# Patient Record
Sex: FEMALE | Race: BLACK OR AFRICAN AMERICAN | Employment: FULL TIME | URBAN - METROPOLITAN AREA
[De-identification: names, ages, dates, MRNs, and addresses within clinical notes are randomized per-mention and may not be internally consistent; named-entity substitution may affect disease eponyms.]

---

## 2018-04-26 ENCOUNTER — HOSPITAL ENCOUNTER (EMERGENCY)
Age: 43
Discharge: HOME | End: 2018-04-27
Attending: EMERGENCY MEDICINE
Payer: COMMERCIAL

## 2018-04-26 DIAGNOSIS — D64.9 ANEMIA, UNSPECIFIED TYPE: Primary | ICD-10-CM

## 2018-04-26 LAB
ALBUMIN SERPL-MCNC: 4.2 G/DL (ref 3.5–5)
ALBUMIN/GLOB SERPL: 1.1 {RATIO} (ref 1.2–3.5)
ALP SERPL-CCNC: 51 U/L (ref 50–136)
ALT SERPL-CCNC: 13 U/L (ref 12–65)
ANION GAP SERPL CALC-SCNC: 13 MMOL/L (ref 7–16)
AST SERPL-CCNC: 13 U/L (ref 15–37)
BASOPHILS # BLD: 0 K/UL (ref 0–0.2)
BASOPHILS NFR BLD: 0 % (ref 0–2)
BILIRUB DIRECT SERPL-MCNC: 0.1 MG/DL
BILIRUB SERPL-MCNC: 0.3 MG/DL (ref 0.2–1.1)
BUN SERPL-MCNC: 9 MG/DL (ref 6–23)
CALCIUM SERPL-MCNC: 8.8 MG/DL (ref 8.3–10.4)
CHLORIDE SERPL-SCNC: 101 MMOL/L (ref 98–107)
CO2 SERPL-SCNC: 26 MMOL/L (ref 21–32)
CREAT SERPL-MCNC: 0.74 MG/DL (ref 0.6–1)
DIFFERENTIAL METHOD BLD: ABNORMAL
EOSINOPHIL # BLD: 0 K/UL (ref 0–0.8)
EOSINOPHIL NFR BLD: 1 % (ref 0.5–7.8)
ERYTHROCYTE [DISTWIDTH] IN BLOOD BY AUTOMATED COUNT: 23.3 % (ref 11.9–14.6)
GLOBULIN SER CALC-MCNC: 3.8 G/DL (ref 2.3–3.5)
GLUCOSE SERPL-MCNC: 124 MG/DL (ref 65–100)
HCT VFR BLD AUTO: 24 % (ref 35.8–46.3)
HGB BLD-MCNC: 5.9 G/DL (ref 11.7–15.4)
IMM GRANULOCYTES # BLD: 0 K/UL (ref 0–0.5)
IMM GRANULOCYTES NFR BLD AUTO: 0 % (ref 0–5)
INR PPP: 1.1
LACTATE BLD-SCNC: 1 MMOL/L (ref 0.5–1.9)
LYMPHOCYTES # BLD: 2.3 K/UL (ref 0.5–4.6)
LYMPHOCYTES NFR BLD: 30 % (ref 13–44)
MAGNESIUM SERPL-MCNC: 2 MG/DL (ref 1.8–2.4)
MCH RBC QN AUTO: 16 PG (ref 26.1–32.9)
MCHC RBC AUTO-ENTMCNC: 24.6 G/DL (ref 31.4–35)
MCV RBC AUTO: 65.2 FL (ref 79.6–97.8)
MONOCYTES # BLD: 0.6 K/UL (ref 0.1–1.3)
MONOCYTES NFR BLD: 8 % (ref 4–12)
NEUTS SEG # BLD: 4.7 K/UL (ref 1.7–8.2)
NEUTS SEG NFR BLD: 61 % (ref 43–78)
PLATELET # BLD AUTO: 386 K/UL (ref 150–450)
PMV BLD AUTO: 9.9 FL (ref 10.8–14.1)
POTASSIUM SERPL-SCNC: 3.8 MMOL/L (ref 3.5–5.1)
PROT SERPL-MCNC: 8 G/DL (ref 6.3–8.2)
PROTHROMBIN TIME: 14.5 SEC (ref 11.5–14.5)
RBC # BLD AUTO: 3.68 M/UL (ref 4.05–5.25)
SODIUM SERPL-SCNC: 140 MMOL/L (ref 136–145)
WBC # BLD AUTO: 7.7 K/UL (ref 4.3–11.1)

## 2018-04-26 PROCEDURE — 36430 TRANSFUSION BLD/BLD COMPNT: CPT

## 2018-04-26 PROCEDURE — 80076 HEPATIC FUNCTION PANEL: CPT | Performed by: EMERGENCY MEDICINE

## 2018-04-26 PROCEDURE — 86923 COMPATIBILITY TEST ELECTRIC: CPT | Performed by: EMERGENCY MEDICINE

## 2018-04-26 PROCEDURE — 80048 BASIC METABOLIC PNL TOTAL CA: CPT | Performed by: EMERGENCY MEDICINE

## 2018-04-26 PROCEDURE — 85025 COMPLETE CBC W/AUTO DIFF WBC: CPT | Performed by: EMERGENCY MEDICINE

## 2018-04-26 PROCEDURE — 99284 EMERGENCY DEPT VISIT MOD MDM: CPT | Performed by: EMERGENCY MEDICINE

## 2018-04-26 PROCEDURE — P9016 RBC LEUKOCYTES REDUCED: HCPCS | Performed by: EMERGENCY MEDICINE

## 2018-04-26 PROCEDURE — 83605 ASSAY OF LACTIC ACID: CPT

## 2018-04-26 PROCEDURE — 86900 BLOOD TYPING SEROLOGIC ABO: CPT | Performed by: EMERGENCY MEDICINE

## 2018-04-26 PROCEDURE — 83735 ASSAY OF MAGNESIUM: CPT | Performed by: EMERGENCY MEDICINE

## 2018-04-26 PROCEDURE — 85610 PROTHROMBIN TIME: CPT | Performed by: EMERGENCY MEDICINE

## 2018-04-26 RX ORDER — SODIUM CHLORIDE 9 MG/ML
250 INJECTION, SOLUTION INTRAVENOUS AS NEEDED
Status: DISCONTINUED | OUTPATIENT
Start: 2018-04-26 | End: 2018-04-27 | Stop reason: HOSPADM

## 2018-04-26 NOTE — ED TRIAGE NOTES
Pt was called by MD office and told to come to ER for transfusion because of abnormal labs. Dr. Johann Ghosh is pts OB-GYN.

## 2018-04-27 VITALS
SYSTOLIC BLOOD PRESSURE: 108 MMHG | HEART RATE: 84 BPM | WEIGHT: 136 LBS | BODY MASS INDEX: 23.22 KG/M2 | HEIGHT: 64 IN | OXYGEN SATURATION: 99 % | TEMPERATURE: 98 F | DIASTOLIC BLOOD PRESSURE: 64 MMHG | RESPIRATION RATE: 18 BRPM

## 2018-04-27 RX ORDER — DOCUSATE SODIUM 100 MG/1
200 CAPSULE, LIQUID FILLED ORAL DAILY
Qty: 62 CAP | Refills: 0 | Status: SHIPPED | OUTPATIENT
Start: 2018-04-27 | End: 2018-04-28

## 2018-04-27 RX ORDER — FERROUS SULFATE 325(65) MG
325 TABLET, DELAYED RELEASE (ENTERIC COATED) ORAL
Qty: 90 TAB | Refills: 0 | Status: SHIPPED | OUTPATIENT
Start: 2018-04-27 | End: 2019-01-24

## 2018-04-27 NOTE — ED NOTES
I have reviewed discharge instructions with the patient. The patient verbalized understanding. Patient left ED via Discharge Method: ambulatory to Home with (family). Opportunity for questions and clarification provided. Patient given 2 scripts. To continue your aftercare when you leave the hospital, you may receive an automated call from our care team to check in on how you are doing. This is a free service and part of our promise to provide the best care and service to meet your aftercare needs.  If you have questions, or wish to unsubscribe from this service please call [phone]. Thank you for Choosing our [facility] Emergency Department.

## 2018-04-28 LAB
ABO + RH BLD: NORMAL
BLD PROD TYP BPU: NORMAL
BLD PROD TYP BPU: NORMAL
BLOOD GROUP ANTIBODIES SERPL: NORMAL
BPU ID: NORMAL
BPU ID: NORMAL
CROSSMATCH RESULT,%XM: NORMAL
CROSSMATCH RESULT,%XM: NORMAL
SPECIMEN EXP DATE BLD: NORMAL
STATUS OF UNIT,%ST: NORMAL
STATUS OF UNIT,%ST: NORMAL
UNIT DIVISION, %UDIV: 0
UNIT DIVISION, %UDIV: 0

## 2018-04-30 NOTE — ED PROVIDER NOTES
HPI Comments: Sent to the e.r. For a hemoglobin of 5.4  Hemoglobin was 7.0, a month ago as checked by her GYN. Patient was not yet started on iron  Seen yesterday and was found to be Hemoccult positive with normal brown stools, however patient is also on her menstrual cycle currently and at the time the Hemoccult was done. She has no complaintdenies heavy periods, has some intermittent dark stools    Patient is a 37 y.o. female presenting with abnormal lab results. The history is provided by the patient. Abnormal Lab Results   This is a new problem. Pertinent negatives include no chest pain, no abdominal pain, no headaches and no shortness of breath. Past Medical History:   Diagnosis Date    Abnormal pap     LGSIL    Contraception     vasectomy    Genital HSV     GERD (gastroesophageal reflux disease)     no meds        Past Surgical History:   Procedure Laterality Date    HX  SECTION      HX GI  14    gastric sleeve, Choudhari    HX MYOMECTOMY      HX TONSILLECTOMY           Family History:   Problem Relation Age of Onset    Heart Disease Maternal Grandmother     Hypertension Maternal Grandmother     Heart Disease Maternal Grandfather     Hypertension Maternal Grandfather     Breast Cancer Neg Hx     Ovarian Cancer Neg Hx     Colon Cancer Neg Hx     Prostate Cancer Neg Hx     Deep Vein Thrombosis Neg Hx     Pulmonary Embolism Neg Hx        Social History     Social History    Marital status: SINGLE     Spouse name: N/A    Number of children: N/A    Years of education: N/A     Occupational History    Not on file. Social History Main Topics    Smoking status: Never Smoker    Smokeless tobacco: Never Used    Alcohol use No      Comment: rare- 2 drinks/month    Drug use: No    Sexual activity: Yes     Partners: Male     Birth control/ protection: None     Other Topics Concern    Not on file     Social History Narrative    1. Use large speculum.      2. sister, Price Rodriguez #[id number] and mom is Perez Morrison #[id number] (both Kofoed's pts)    3. PCP Dr. Holly Prado,          ALLERGIES: Review of patient's allergies indicates no known allergies. Review of Systems   Constitutional: Negative for chills and fever. HENT: Negative for rhinorrhea and sore throat. Eyes: Negative for discharge and redness. Respiratory: Negative for cough and shortness of breath. Cardiovascular: Negative for chest pain and palpitations. Gastrointestinal: Positive for blood in stool (possible melena, intermittently). Negative for abdominal pain, diarrhea, nausea and vomiting. Musculoskeletal: Negative for arthralgias and back pain. Skin: Negative for rash. Neurological: Negative for dizziness, light-headedness and headaches. All other systems reviewed and are negative. Vitals:    04/27/18 0017 04/27/18 0047 04/27/18 0117 04/27/18 0145   BP: 92/58 104/68 107/57 108/64   Pulse:    84   Resp: 20 20 20 18   Temp:    98 °F (36.7 °C)   SpO2: 100% 100% 100% 99%   Weight:       Height:                Physical Exam   Constitutional: She is oriented to person, place, and time. She appears well-developed and well-nourished. No distress. HENT:   Head: Normocephalic and atraumatic. Eyes: Conjunctivae are normal. Pupils are equal, round, and reactive to light. Right eye exhibits no discharge. Left eye exhibits no discharge. No scleral icterus. Neck: Normal range of motion. Neck supple. Cardiovascular: Normal rate, regular rhythm and normal heart sounds. Exam reveals no gallop. No murmur heard. Pulmonary/Chest: Effort normal and breath sounds normal. No respiratory distress. She has no wheezes. She has no rales. Abdominal: Soft. Bowel sounds are normal. There is no tenderness. There is no guarding.    Genitourinary:   Genitourinary Comments: Rectal exam deferred since she was reportedly Hemoccult-positive yesterday, and is currently on her menstrual cycle Musculoskeletal: Normal range of motion. She exhibits no edema. Neurological: She is alert and oriented to person, place, and time. She exhibits normal muscle tone. cni 2-12 grossly   Skin: Skin is warm and dry. She is not diaphoretic. Psychiatric: She has a normal mood and affect. Her behavior is normal.   Nursing note and vitals reviewed. MDM  Number of Diagnoses or Management Options  Anemia, unspecified type:   Diagnosis management comments: Medical decision making note:  Anemia, stable  Transfused 2 units  Start PPI, refer to GI  This concludes the \"medical decision making note\" part of this emergency department visit note.           ED Course       Procedures

## 2018-06-01 ENCOUNTER — HOSPITAL ENCOUNTER (OUTPATIENT)
Dept: LAB | Age: 43
Discharge: HOME | End: 2018-06-01
Payer: COMMERCIAL

## 2018-06-01 DIAGNOSIS — D64.9 ANEMIA, UNSPECIFIED TYPE: ICD-10-CM

## 2018-06-01 PROBLEM — D50.0 IRON DEFICIENCY ANEMIA DUE TO CHRONIC BLOOD LOSS: Status: ACTIVE | Noted: 2018-06-01

## 2018-06-01 LAB
ALBUMIN SERPL-MCNC: 4 G/DL (ref 3.5–5)
ALBUMIN/GLOB SERPL: 1.1 {RATIO} (ref 1.2–3.5)
ALP SERPL-CCNC: 46 U/L (ref 50–136)
ALT SERPL-CCNC: 18 U/L (ref 12–65)
ANION GAP SERPL CALC-SCNC: 3 MMOL/L (ref 7–16)
AST SERPL-CCNC: 14 U/L (ref 15–37)
BASOPHILS # BLD: 0 K/UL (ref 0–0.2)
BASOPHILS NFR BLD: 0 % (ref 0–2)
BILIRUB SERPL-MCNC: 0.4 MG/DL (ref 0.2–1.1)
BUN SERPL-MCNC: 10 MG/DL (ref 6–23)
CALCIUM SERPL-MCNC: 8.8 MG/DL (ref 8.3–10.4)
CHLORIDE SERPL-SCNC: 105 MMOL/L (ref 98–107)
CO2 SERPL-SCNC: 30 MMOL/L (ref 21–32)
CREAT SERPL-MCNC: 0.57 MG/DL (ref 0.6–1)
DIFFERENTIAL METHOD BLD: ABNORMAL
EOSINOPHIL # BLD: 0 K/UL (ref 0–0.8)
EOSINOPHIL NFR BLD: 0 % (ref 0.5–7.8)
ERYTHROCYTE [DISTWIDTH] IN BLOOD BY AUTOMATED COUNT: 25.8 % (ref 11.9–14.6)
FERRITIN SERPL-MCNC: 5 NG/ML (ref 8–388)
FOLATE SERPL-MCNC: 20.6 NG/ML (ref 3.1–17.5)
GLOBULIN SER CALC-MCNC: 3.6 G/DL (ref 2.3–3.5)
GLUCOSE SERPL-MCNC: 70 MG/DL (ref 65–100)
HAPTOGLOB SERPL-MCNC: 104 MG/DL (ref 30–200)
HCT VFR BLD AUTO: 33.2 % (ref 35.8–46.3)
HGB BLD-MCNC: 9.5 G/DL (ref 11.7–15.4)
HGB RETIC QN AUTO: 20 PG (ref 29–35)
IMM RETICS NFR: 17.9 % (ref 3–15.9)
IRON SATN MFR SERPL: 7 %
IRON SERPL-MCNC: 31 UG/DL (ref 35–150)
LDH SERPL L TO P-CCNC: 155 U/L (ref 100–190)
LYMPHOCYTES # BLD: 1.9 K/UL (ref 0.5–4.6)
LYMPHOCYTES NFR BLD: 37 % (ref 13–44)
MCH RBC QN AUTO: 22.1 PG (ref 26.1–32.9)
MCHC RBC AUTO-ENTMCNC: 28.6 G/DL (ref 31.4–35)
MCV RBC AUTO: 77.4 FL (ref 79.6–97.8)
MONOCYTES # BLD: 0.5 K/UL (ref 0.1–1.3)
MONOCYTES NFR BLD: 10 % (ref 4–12)
NEUTS SEG # BLD: 2.8 K/UL (ref 1.7–8.2)
NEUTS SEG NFR BLD: 53 % (ref 43–78)
NRBC # BLD: 0 K/UL (ref 0–0.2)
PLATELET # BLD AUTO: 187 K/UL (ref 150–450)
PLATELET COMMENTS,PCOM: ADEQUATE
POTASSIUM SERPL-SCNC: 4 MMOL/L (ref 3.5–5.1)
PROT SERPL-MCNC: 7.6 G/DL (ref 6.3–8.2)
RBC # BLD AUTO: 4.29 M/UL (ref 4.05–5.25)
RBC MORPH BLD: ABNORMAL
RETICS # AUTO: 0.04 M/UL (ref 0.03–0.1)
RETICS/RBC NFR AUTO: 0.9 % (ref 0.3–2)
SODIUM SERPL-SCNC: 138 MMOL/L (ref 136–145)
TIBC SERPL-MCNC: 436 UG/DL (ref 250–450)
VIT B12 SERPL-MCNC: 388 PG/ML (ref 193–986)
WBC # BLD AUTO: 5.2 K/UL (ref 4.3–11.1)
WBC MORPH BLD: ABNORMAL

## 2018-06-01 PROCEDURE — 83010 ASSAY OF HAPTOGLOBIN QUANT: CPT | Performed by: INTERNAL MEDICINE

## 2018-06-01 PROCEDURE — 83615 LACTATE (LD) (LDH) ENZYME: CPT | Performed by: INTERNAL MEDICINE

## 2018-06-01 PROCEDURE — 82607 VITAMIN B-12: CPT | Performed by: INTERNAL MEDICINE

## 2018-06-01 PROCEDURE — 82728 ASSAY OF FERRITIN: CPT | Performed by: INTERNAL MEDICINE

## 2018-06-01 PROCEDURE — 82525 ASSAY OF COPPER: CPT | Performed by: INTERNAL MEDICINE

## 2018-06-01 PROCEDURE — 80053 COMPREHEN METABOLIC PANEL: CPT | Performed by: INTERNAL MEDICINE

## 2018-06-01 PROCEDURE — 83540 ASSAY OF IRON: CPT | Performed by: INTERNAL MEDICINE

## 2018-06-01 PROCEDURE — 82746 ASSAY OF FOLIC ACID SERUM: CPT | Performed by: INTERNAL MEDICINE

## 2018-06-01 PROCEDURE — 85046 RETICYTE/HGB CONCENTRATE: CPT | Performed by: INTERNAL MEDICINE

## 2018-06-01 PROCEDURE — 85025 COMPLETE CBC W/AUTO DIFF WBC: CPT | Performed by: INTERNAL MEDICINE

## 2018-06-01 PROCEDURE — 36415 COLL VENOUS BLD VENIPUNCTURE: CPT | Performed by: INTERNAL MEDICINE

## 2018-06-02 LAB — COPPER SERPL-MCNC: 111 UG/DL (ref 72–166)

## 2018-06-06 LAB — PATH REV BLD -IMP: NORMAL

## 2018-06-12 ENCOUNTER — HOSPITAL ENCOUNTER (OUTPATIENT)
Dept: INFUSION THERAPY | Age: 43
Discharge: HOME | End: 2018-06-12
Payer: COMMERCIAL

## 2018-06-12 VITALS
WEIGHT: 142 LBS | DIASTOLIC BLOOD PRESSURE: 61 MMHG | SYSTOLIC BLOOD PRESSURE: 94 MMHG | BODY MASS INDEX: 24.37 KG/M2 | RESPIRATION RATE: 16 BRPM | TEMPERATURE: 97.8 F | OXYGEN SATURATION: 100 % | HEART RATE: 59 BPM

## 2018-06-12 DIAGNOSIS — D50.0 IRON DEFICIENCY ANEMIA DUE TO CHRONIC BLOOD LOSS: ICD-10-CM

## 2018-06-12 PROCEDURE — 96361 HYDRATE IV INFUSION ADD-ON: CPT

## 2018-06-12 PROCEDURE — 96365 THER/PROPH/DIAG IV INF INIT: CPT

## 2018-06-12 PROCEDURE — 74011250636 HC RX REV CODE- 250/636: Performed by: INTERNAL MEDICINE

## 2018-06-12 RX ADMIN — SODIUM CHLORIDE 500 ML: 900 INJECTION, SOLUTION INTRAVENOUS at 15:45

## 2018-06-12 RX ADMIN — FERRIC CARBOXYMALTOSE INJECTION 750 MG: 50 INJECTION, SOLUTION INTRAVENOUS at 15:54

## 2018-06-12 NOTE — PROGRESS NOTES
Pt arrived ambulatory today at 1504, to receive IV injectafer. Pt tolerated without difficulty. Patient discharged via ambulatory accompanied by self. Instructed to notify physician of any problems, questions or concerns. Allowed opportunity for patient/family to ask questions. Verbalized understanding. Next appointment is June 19 at 1500 with Jg Pressley.

## 2018-06-19 ENCOUNTER — HOSPITAL ENCOUNTER (OUTPATIENT)
Dept: INFUSION THERAPY | Age: 43
Discharge: HOME | End: 2018-06-19
Payer: COMMERCIAL

## 2018-06-19 VITALS
BODY MASS INDEX: 23.79 KG/M2 | TEMPERATURE: 98.1 F | OXYGEN SATURATION: 100 % | DIASTOLIC BLOOD PRESSURE: 62 MMHG | RESPIRATION RATE: 16 BRPM | WEIGHT: 138.6 LBS | SYSTOLIC BLOOD PRESSURE: 94 MMHG | HEART RATE: 86 BPM

## 2018-06-19 DIAGNOSIS — D50.0 IRON DEFICIENCY ANEMIA DUE TO CHRONIC BLOOD LOSS: ICD-10-CM

## 2018-06-19 PROCEDURE — 74011250636 HC RX REV CODE- 250/636: Performed by: INTERNAL MEDICINE

## 2018-06-19 PROCEDURE — 96365 THER/PROPH/DIAG IV INF INIT: CPT

## 2018-06-19 RX ADMIN — FERRIC CARBOXYMALTOSE INJECTION 750 MG: 50 INJECTION, SOLUTION INTRAVENOUS at 15:30

## 2018-06-19 RX ADMIN — SODIUM CHLORIDE 500 ML: 900 INJECTION, SOLUTION INTRAVENOUS at 15:25

## 2018-06-19 NOTE — PROGRESS NOTES
Arrived to the [facility]. Injectafer completed. Patient tolerated well. Any issues or concerns during appointment: none  Patient does not require next infusion appointment. MD f/elvia scheduled 7/16/18. Discharged ambulatory.

## 2018-06-28 PROBLEM — N92.0 MENORRHAGIA: Status: ACTIVE | Noted: 2018-06-28

## 2018-07-16 ENCOUNTER — HOSPITAL ENCOUNTER (OUTPATIENT)
Dept: MAMMOGRAPHY | Age: 43
Discharge: HOME | End: 2018-07-16
Attending: OBSTETRICS & GYNECOLOGY
Payer: COMMERCIAL

## 2018-07-16 ENCOUNTER — HOSPITAL ENCOUNTER (OUTPATIENT)
Dept: LAB | Age: 43
Discharge: HOME | End: 2018-07-16
Payer: COMMERCIAL

## 2018-07-16 DIAGNOSIS — D50.0 IRON DEFICIENCY ANEMIA DUE TO CHRONIC BLOOD LOSS: ICD-10-CM

## 2018-07-16 DIAGNOSIS — Z12.39 SCREENING FOR BREAST CANCER: ICD-10-CM

## 2018-07-16 LAB
ALBUMIN SERPL-MCNC: 4 G/DL (ref 3.5–5)
ALBUMIN/GLOB SERPL: 1.4 {RATIO} (ref 1.2–3.5)
ALP SERPL-CCNC: 53 U/L (ref 50–136)
ALT SERPL-CCNC: 25 U/L (ref 12–65)
ANION GAP SERPL CALC-SCNC: 5 MMOL/L (ref 7–16)
AST SERPL-CCNC: 13 U/L (ref 15–37)
BASOPHILS # BLD: 0 K/UL (ref 0–0.2)
BASOPHILS NFR BLD: 0 % (ref 0–2)
BILIRUB SERPL-MCNC: 0.2 MG/DL (ref 0.2–1.1)
BUN SERPL-MCNC: 9 MG/DL (ref 6–23)
CALCIUM SERPL-MCNC: 8.3 MG/DL (ref 8.3–10.4)
CHLORIDE SERPL-SCNC: 106 MMOL/L (ref 98–107)
CO2 SERPL-SCNC: 28 MMOL/L (ref 21–32)
CREAT SERPL-MCNC: 0.57 MG/DL (ref 0.6–1)
DIFFERENTIAL METHOD BLD: ABNORMAL
EOSINOPHIL # BLD: 0.1 K/UL (ref 0–0.8)
EOSINOPHIL NFR BLD: 1 % (ref 0.5–7.8)
ERYTHROCYTE [DISTWIDTH] IN BLOOD BY AUTOMATED COUNT: 21.3 % (ref 11.9–14.6)
FERRITIN SERPL-MCNC: 192 NG/ML (ref 8–388)
GLOBULIN SER CALC-MCNC: 2.9 G/DL (ref 2.3–3.5)
GLUCOSE SERPL-MCNC: 107 MG/DL (ref 65–100)
HCT VFR BLD AUTO: 37.6 % (ref 35.8–46.3)
HGB BLD-MCNC: 11.5 G/DL (ref 11.7–15.4)
HGB RETIC QN AUTO: 31 PG (ref 29–35)
IMM RETICS NFR: 10.5 % (ref 3–15.9)
IRON SATN MFR SERPL: 21 %
IRON SERPL-MCNC: 62 UG/DL (ref 35–150)
LYMPHOCYTES # BLD: 1.7 K/UL (ref 0.5–4.6)
LYMPHOCYTES NFR BLD: 32 % (ref 13–44)
MCH RBC QN AUTO: 26.8 PG (ref 26.1–32.9)
MCHC RBC AUTO-ENTMCNC: 30.6 G/DL (ref 31.4–35)
MCV RBC AUTO: 87.6 FL (ref 79.6–97.8)
MONOCYTES # BLD: 0.4 K/UL (ref 0.1–1.3)
MONOCYTES NFR BLD: 7 % (ref 4–12)
NEUTS SEG # BLD: 3 K/UL (ref 1.7–8.2)
NEUTS SEG NFR BLD: 60 % (ref 43–78)
NRBC # BLD: 0 K/UL (ref 0–0.2)
PLATELET # BLD AUTO: 124 K/UL (ref 150–450)
PLATELET COMMENTS,PCOM: ABNORMAL
POTASSIUM SERPL-SCNC: 4.1 MMOL/L (ref 3.5–5.1)
PROT SERPL-MCNC: 6.9 G/DL (ref 6.3–8.2)
RBC # BLD AUTO: 4.29 M/UL (ref 4.05–5.25)
RBC MORPH BLD: ABNORMAL
RETICS # AUTO: 0.05 M/UL (ref 0.03–0.1)
RETICS/RBC NFR AUTO: 1.1 % (ref 0.3–2)
SODIUM SERPL-SCNC: 139 MMOL/L (ref 136–145)
TIBC SERPL-MCNC: 290 UG/DL (ref 250–450)
WBC # BLD AUTO: 5.2 K/UL (ref 4.3–11.1)
WBC MORPH BLD: ABNORMAL

## 2018-07-16 PROCEDURE — 80053 COMPREHEN METABOLIC PANEL: CPT | Performed by: INTERNAL MEDICINE

## 2018-07-16 PROCEDURE — 83540 ASSAY OF IRON: CPT | Performed by: INTERNAL MEDICINE

## 2018-07-16 PROCEDURE — 85025 COMPLETE CBC W/AUTO DIFF WBC: CPT | Performed by: INTERNAL MEDICINE

## 2018-07-16 PROCEDURE — 77067 SCR MAMMO BI INCL CAD: CPT

## 2018-07-16 PROCEDURE — 85046 RETICYTE/HGB CONCENTRATE: CPT | Performed by: INTERNAL MEDICINE

## 2018-07-16 PROCEDURE — 36415 COLL VENOUS BLD VENIPUNCTURE: CPT | Performed by: INTERNAL MEDICINE

## 2018-07-16 PROCEDURE — 82728 ASSAY OF FERRITIN: CPT | Performed by: INTERNAL MEDICINE

## 2018-09-17 ENCOUNTER — HOSPITAL ENCOUNTER (OUTPATIENT)
Dept: INFUSION THERAPY | Age: 43
Discharge: HOME | End: 2018-09-17

## 2018-09-17 ENCOUNTER — HOSPITAL ENCOUNTER (OUTPATIENT)
Dept: LAB | Age: 43
Discharge: HOME | End: 2018-09-17
Payer: COMMERCIAL

## 2018-09-17 DIAGNOSIS — D50.0 IRON DEFICIENCY ANEMIA DUE TO CHRONIC BLOOD LOSS: ICD-10-CM

## 2018-09-17 LAB
ALBUMIN SERPL-MCNC: 3.9 G/DL (ref 3.5–5)
ALBUMIN/GLOB SERPL: 1.2 {RATIO} (ref 1.2–3.5)
ALP SERPL-CCNC: 52 U/L (ref 50–136)
ALT SERPL-CCNC: 19 U/L (ref 12–65)
ANION GAP SERPL CALC-SCNC: 6 MMOL/L (ref 7–16)
AST SERPL-CCNC: 10 U/L (ref 15–37)
BASOPHILS # BLD: 0 K/UL (ref 0–0.2)
BASOPHILS NFR BLD: 0 % (ref 0–2)
BILIRUB SERPL-MCNC: 0.3 MG/DL (ref 0.2–1.1)
BUN SERPL-MCNC: 9 MG/DL (ref 6–23)
CALCIUM SERPL-MCNC: 8.6 MG/DL (ref 8.3–10.4)
CHLORIDE SERPL-SCNC: 106 MMOL/L (ref 98–107)
CO2 SERPL-SCNC: 27 MMOL/L (ref 21–32)
CREAT SERPL-MCNC: 0.69 MG/DL (ref 0.6–1)
DIFFERENTIAL METHOD BLD: ABNORMAL
EOSINOPHIL # BLD: 0 K/UL (ref 0–0.8)
EOSINOPHIL NFR BLD: 0 % (ref 0.5–7.8)
ERYTHROCYTE [DISTWIDTH] IN BLOOD BY AUTOMATED COUNT: 14 % (ref 11.9–14.6)
FERRITIN SERPL-MCNC: 63 NG/ML (ref 8–388)
FOLATE SERPL-MCNC: 17.7 NG/ML (ref 3.1–17.5)
GLOBULIN SER CALC-MCNC: 3.3 G/DL (ref 2.3–3.5)
GLUCOSE SERPL-MCNC: 101 MG/DL (ref 65–100)
HBV SURFACE AB SERPL IA-ACNC: <3.1 MIU/ML
HCT VFR BLD AUTO: 39.2 % (ref 35.8–46.3)
HGB BLD-MCNC: 12.1 G/DL (ref 11.7–15.4)
HGB RETIC QN AUTO: 31 PG (ref 29–35)
IMM GRANULOCYTES # BLD: 0 K/UL (ref 0–0.5)
IMM GRANULOCYTES NFR BLD AUTO: 0 % (ref 0–5)
IMM RETICS NFR: 10.4 % (ref 3–15.9)
IRON SATN MFR SERPL: 22 %
IRON SERPL-MCNC: 53 UG/DL (ref 35–150)
LYMPHOCYTES # BLD: 1.8 K/UL (ref 0.5–4.6)
LYMPHOCYTES NFR BLD: 26 % (ref 13–44)
MCH RBC QN AUTO: 27.4 PG (ref 26.1–32.9)
MCHC RBC AUTO-ENTMCNC: 30.9 G/DL (ref 31.4–35)
MCV RBC AUTO: 88.9 FL (ref 79.6–97.8)
MONOCYTES # BLD: 0.5 K/UL (ref 0.1–1.3)
MONOCYTES NFR BLD: 7 % (ref 4–12)
NEUTS SEG # BLD: 4.6 K/UL (ref 1.7–8.2)
NEUTS SEG NFR BLD: 66 % (ref 43–78)
NRBC # BLD: 0 K/UL (ref 0–0.2)
PLATELET # BLD AUTO: 129 K/UL (ref 150–450)
PMV BLD AUTO: 10.8 FL (ref 9.4–12.3)
POTASSIUM SERPL-SCNC: 3.9 MMOL/L (ref 3.5–5.1)
PROT SERPL-MCNC: 7.2 G/DL (ref 6.3–8.2)
RBC # BLD AUTO: 4.41 M/UL (ref 4.05–5.25)
RETICS # AUTO: 0.05 M/UL (ref 0.03–0.1)
RETICS/RBC NFR AUTO: 1.1 % (ref 0.3–2)
SODIUM SERPL-SCNC: 139 MMOL/L (ref 136–145)
TIBC SERPL-MCNC: 243 UG/DL (ref 250–450)
TSH SERPL DL<=0.005 MIU/L-ACNC: 0.91 UIU/ML (ref 0.36–3.74)
VIT B12 SERPL-MCNC: 332 PG/ML (ref 193–986)
WBC # BLD AUTO: 6.9 K/UL (ref 4.3–11.1)

## 2018-09-17 PROCEDURE — 86706 HEP B SURFACE ANTIBODY: CPT

## 2018-09-17 PROCEDURE — 82607 VITAMIN B-12: CPT

## 2018-09-17 PROCEDURE — 80053 COMPREHEN METABOLIC PANEL: CPT

## 2018-09-17 PROCEDURE — 83540 ASSAY OF IRON: CPT

## 2018-09-17 PROCEDURE — 80074 ACUTE HEPATITIS PANEL: CPT

## 2018-09-17 PROCEDURE — 85025 COMPLETE CBC W/AUTO DIFF WBC: CPT

## 2018-09-17 PROCEDURE — 84443 ASSAY THYROID STIM HORMONE: CPT

## 2018-09-17 PROCEDURE — 85046 RETICYTE/HGB CONCENTRATE: CPT

## 2018-09-17 PROCEDURE — 36415 COLL VENOUS BLD VENIPUNCTURE: CPT

## 2018-09-17 PROCEDURE — 82728 ASSAY OF FERRITIN: CPT

## 2018-09-17 PROCEDURE — 82746 ASSAY OF FOLIC ACID SERUM: CPT

## 2018-09-18 LAB
HAV IGM SERPL QL IA: NEGATIVE
HBV CORE IGM SERPL QL IA: NEGATIVE
HBV SURFACE AG SERPL QL IA: NEGATIVE
HCV AB S/CO SERPL IA: <0.1 S/CO RATIO (ref 0–0.9)

## 2019-01-24 ENCOUNTER — HOSPITAL ENCOUNTER (OUTPATIENT)
Dept: LAB | Age: 44
Discharge: HOME | End: 2019-01-24
Payer: COMMERCIAL

## 2019-01-24 DIAGNOSIS — D50.0 IRON DEFICIENCY ANEMIA DUE TO CHRONIC BLOOD LOSS: ICD-10-CM

## 2019-01-24 PROBLEM — Z98.84 HISTORY OF WEIGHT LOSS SURGERY: Status: ACTIVE | Noted: 2019-01-24

## 2019-01-24 LAB
ALBUMIN SERPL-MCNC: 3.9 G/DL (ref 3.5–5)
ALBUMIN/GLOB SERPL: 1.1 {RATIO} (ref 1.2–3.5)
ALP SERPL-CCNC: 48 U/L (ref 50–136)
ALT SERPL-CCNC: 10 U/L (ref 12–65)
ANION GAP SERPL CALC-SCNC: 5 MMOL/L (ref 7–16)
AST SERPL-CCNC: 7 U/L (ref 15–37)
BASOPHILS # BLD: 0 K/UL (ref 0–0.2)
BASOPHILS NFR BLD: 0 % (ref 0–2)
BILIRUB SERPL-MCNC: 0.3 MG/DL (ref 0.2–1.1)
BUN SERPL-MCNC: 8 MG/DL (ref 6–23)
CALCIUM SERPL-MCNC: 8.2 MG/DL (ref 8.3–10.4)
CHLORIDE SERPL-SCNC: 107 MMOL/L (ref 98–107)
CO2 SERPL-SCNC: 28 MMOL/L (ref 21–32)
CREAT SERPL-MCNC: 0.63 MG/DL (ref 0.6–1)
DIFFERENTIAL METHOD BLD: ABNORMAL
EOSINOPHIL # BLD: 0 K/UL (ref 0–0.8)
EOSINOPHIL NFR BLD: 1 % (ref 0.5–7.8)
ERYTHROCYTE [DISTWIDTH] IN BLOOD BY AUTOMATED COUNT: 12.8 % (ref 11.9–14.6)
FERRITIN SERPL-MCNC: 4 NG/ML (ref 8–388)
FOLATE SERPL-MCNC: 15.8 NG/ML (ref 3.1–17.5)
GLOBULIN SER CALC-MCNC: 3.4 G/DL (ref 2.3–3.5)
GLUCOSE SERPL-MCNC: 76 MG/DL (ref 65–100)
HCT VFR BLD AUTO: 36.5 % (ref 35.8–46.3)
HGB BLD-MCNC: 11.2 G/DL (ref 11.7–15.4)
HGB RETIC QN AUTO: 24 PG (ref 29–35)
IMM GRANULOCYTES # BLD AUTO: 0 K/UL (ref 0–0.5)
IMM GRANULOCYTES NFR BLD AUTO: 0 % (ref 0–5)
IMM RETICS NFR: 22.8 % (ref 3–15.9)
IRON SATN MFR SERPL: 9 %
IRON SERPL-MCNC: 33 UG/DL (ref 35–150)
LYMPHOCYTES # BLD: 1.4 K/UL (ref 0.5–4.6)
LYMPHOCYTES NFR BLD: 26 % (ref 13–44)
MCH RBC QN AUTO: 26.8 PG (ref 26.1–32.9)
MCHC RBC AUTO-ENTMCNC: 30.7 G/DL (ref 31.4–35)
MCV RBC AUTO: 87.3 FL (ref 79.6–97.8)
MONOCYTES # BLD: 0.4 K/UL (ref 0.1–1.3)
MONOCYTES NFR BLD: 8 % (ref 4–12)
NEUTS SEG # BLD: 3.6 K/UL (ref 1.7–8.2)
NEUTS SEG NFR BLD: 66 % (ref 43–78)
NRBC # BLD: 0.01 K/UL (ref 0–0.2)
PLATELET # BLD AUTO: 147 K/UL (ref 150–450)
PMV BLD AUTO: 11.2 FL (ref 9.4–12.3)
POTASSIUM SERPL-SCNC: 4.1 MMOL/L (ref 3.5–5.1)
PROT SERPL-MCNC: 7.3 G/DL (ref 6.3–8.2)
RBC # BLD AUTO: 4.18 M/UL (ref 4.05–5.25)
RETICS # AUTO: 0.04 M/UL (ref 0.03–0.1)
RETICS/RBC NFR AUTO: 1 % (ref 0.3–2)
SODIUM SERPL-SCNC: 140 MMOL/L (ref 136–145)
TIBC SERPL-MCNC: 358 UG/DL (ref 250–450)
VIT B12 SERPL-MCNC: 341 PG/ML (ref 193–986)
WBC # BLD AUTO: 5.4 K/UL (ref 4.3–11.1)

## 2019-01-24 PROCEDURE — 82746 ASSAY OF FOLIC ACID SERUM: CPT

## 2019-01-24 PROCEDURE — 83540 ASSAY OF IRON: CPT

## 2019-01-24 PROCEDURE — 85046 RETICYTE/HGB CONCENTRATE: CPT

## 2019-01-24 PROCEDURE — 82728 ASSAY OF FERRITIN: CPT

## 2019-01-24 PROCEDURE — 87389 HIV-1 AG W/HIV-1&-2 AB AG IA: CPT

## 2019-01-24 PROCEDURE — 85025 COMPLETE CBC W/AUTO DIFF WBC: CPT

## 2019-01-24 PROCEDURE — 36415 COLL VENOUS BLD VENIPUNCTURE: CPT

## 2019-01-24 PROCEDURE — 80053 COMPREHEN METABOLIC PANEL: CPT

## 2019-01-24 PROCEDURE — 84630 ASSAY OF ZINC: CPT

## 2019-01-24 PROCEDURE — 82607 VITAMIN B-12: CPT

## 2019-01-24 PROCEDURE — 82525 ASSAY OF COPPER: CPT

## 2019-01-25 LAB — HIV 1+2 AB+HIV1 P24 AG SERPL QL IA: NON REACTIVE

## 2019-01-26 LAB
COPPER SERPL-MCNC: 108 UG/DL (ref 72–166)
ZINC SERPL-MCNC: 65 UG/DL (ref 56–134)

## 2019-02-21 ENCOUNTER — HOSPITAL ENCOUNTER (OUTPATIENT)
Dept: LAB | Age: 44
Discharge: HOME | End: 2019-02-21
Payer: COMMERCIAL

## 2019-02-21 DIAGNOSIS — D50.0 IRON DEFICIENCY ANEMIA DUE TO CHRONIC BLOOD LOSS: ICD-10-CM

## 2019-02-21 LAB
ALBUMIN SERPL-MCNC: 4.1 G/DL (ref 3.5–5)
ALBUMIN/GLOB SERPL: 1.3 {RATIO} (ref 1.2–3.5)
ALP SERPL-CCNC: 52 U/L (ref 50–136)
ALT SERPL-CCNC: 11 U/L (ref 12–65)
ANION GAP SERPL CALC-SCNC: 7 MMOL/L (ref 7–16)
AST SERPL-CCNC: 8 U/L (ref 15–37)
BASOPHILS # BLD: 0 K/UL (ref 0–0.2)
BASOPHILS NFR BLD: 0 % (ref 0–2)
BILIRUB SERPL-MCNC: 0.5 MG/DL (ref 0.2–1.1)
BUN SERPL-MCNC: 7 MG/DL (ref 6–23)
CALCIUM SERPL-MCNC: 8.8 MG/DL (ref 8.3–10.4)
CHLORIDE SERPL-SCNC: 106 MMOL/L (ref 98–107)
CO2 SERPL-SCNC: 27 MMOL/L (ref 21–32)
CREAT SERPL-MCNC: 0.66 MG/DL (ref 0.6–1)
DIFFERENTIAL METHOD BLD: ABNORMAL
EOSINOPHIL # BLD: 0.1 K/UL (ref 0–0.8)
EOSINOPHIL NFR BLD: 1 % (ref 0.5–7.8)
ERYTHROCYTE [DISTWIDTH] IN BLOOD BY AUTOMATED COUNT: 13.7 % (ref 11.9–14.6)
FERRITIN SERPL-MCNC: 9 NG/ML (ref 8–388)
GLOBULIN SER CALC-MCNC: 3.1 G/DL (ref 2.3–3.5)
GLUCOSE SERPL-MCNC: 75 MG/DL (ref 65–100)
HCT VFR BLD AUTO: 39.1 % (ref 35.8–46.3)
HGB BLD-MCNC: 11.8 G/DL (ref 11.7–15.4)
HGB RETIC QN AUTO: 29 PG (ref 29–35)
IMM GRANULOCYTES # BLD AUTO: 0 K/UL (ref 0–0.5)
IMM GRANULOCYTES NFR BLD AUTO: 0 % (ref 0–5)
IMM RETICS NFR: 14.4 % (ref 3–15.9)
IRON SATN MFR SERPL: 18 %
IRON SERPL-MCNC: 64 UG/DL (ref 35–150)
LYMPHOCYTES # BLD: 1.6 K/UL (ref 0.5–4.6)
LYMPHOCYTES NFR BLD: 29 % (ref 13–44)
MCH RBC QN AUTO: 26.7 PG (ref 26.1–32.9)
MCHC RBC AUTO-ENTMCNC: 30.2 G/DL (ref 31.4–35)
MCV RBC AUTO: 88.5 FL (ref 79.6–97.8)
MONOCYTES # BLD: 0.5 K/UL (ref 0.1–1.3)
MONOCYTES NFR BLD: 10 % (ref 4–12)
NEUTS SEG # BLD: 3.2 K/UL (ref 1.7–8.2)
NEUTS SEG NFR BLD: 60 % (ref 43–78)
NRBC # BLD: 0 K/UL (ref 0–0.2)
PLATELET # BLD AUTO: 149 K/UL (ref 150–450)
PLATELET COMMENTS,PCOM: ADEQUATE
PMV BLD AUTO: 10.6 FL (ref 9.4–12.3)
POTASSIUM SERPL-SCNC: 3.8 MMOL/L (ref 3.5–5.1)
PROT SERPL-MCNC: 7.2 G/DL (ref 6.3–8.2)
RBC # BLD AUTO: 4.42 M/UL (ref 4.05–5.25)
RBC MORPH BLD: ABNORMAL
RETICS # AUTO: 0.06 M/UL (ref 0.03–0.1)
RETICS/RBC NFR AUTO: 1.3 % (ref 0.3–2)
SODIUM SERPL-SCNC: 140 MMOL/L (ref 136–145)
TIBC SERPL-MCNC: 354 UG/DL (ref 250–450)
WBC # BLD AUTO: 5.4 K/UL (ref 4.3–11.1)
WBC MORPH BLD: ABNORMAL

## 2019-02-21 PROCEDURE — 80053 COMPREHEN METABOLIC PANEL: CPT

## 2019-02-21 PROCEDURE — 36415 COLL VENOUS BLD VENIPUNCTURE: CPT

## 2019-02-21 PROCEDURE — 83540 ASSAY OF IRON: CPT

## 2019-02-21 PROCEDURE — 82728 ASSAY OF FERRITIN: CPT

## 2019-02-21 PROCEDURE — 85025 COMPLETE CBC W/AUTO DIFF WBC: CPT

## 2019-02-21 PROCEDURE — 85046 RETICYTE/HGB CONCENTRATE: CPT

## 2019-06-05 ENCOUNTER — HOSPITAL ENCOUNTER (OUTPATIENT)
Dept: LAB | Age: 44
Discharge: HOME | End: 2019-06-05
Payer: COMMERCIAL

## 2019-06-05 DIAGNOSIS — D50.0 IRON DEFICIENCY ANEMIA DUE TO CHRONIC BLOOD LOSS: ICD-10-CM

## 2019-06-05 LAB
ALBUMIN SERPL-MCNC: 3.9 G/DL (ref 3.5–5)
ALBUMIN/GLOB SERPL: 1.1 {RATIO} (ref 1.2–3.5)
ALP SERPL-CCNC: 56 U/L (ref 50–136)
ALT SERPL-CCNC: 9 U/L (ref 12–65)
ANION GAP SERPL CALC-SCNC: 7 MMOL/L (ref 7–16)
AST SERPL-CCNC: 8 U/L (ref 15–37)
BASOPHILS # BLD: 0 K/UL (ref 0–0.2)
BASOPHILS NFR BLD: 0 % (ref 0–2)
BILIRUB SERPL-MCNC: 0.3 MG/DL (ref 0.2–1.1)
BUN SERPL-MCNC: 9 MG/DL (ref 6–23)
CALCIUM SERPL-MCNC: 8.9 MG/DL (ref 8.3–10.4)
CHLORIDE SERPL-SCNC: 107 MMOL/L (ref 98–107)
CO2 SERPL-SCNC: 26 MMOL/L (ref 21–32)
CREAT SERPL-MCNC: 0.69 MG/DL (ref 0.6–1)
DIFFERENTIAL METHOD BLD: ABNORMAL
EOSINOPHIL # BLD: 0.1 K/UL (ref 0–0.8)
EOSINOPHIL NFR BLD: 1 % (ref 0.5–7.8)
ERYTHROCYTE [DISTWIDTH] IN BLOOD BY AUTOMATED COUNT: 14.6 % (ref 11.9–14.6)
FERRITIN SERPL-MCNC: 3 NG/ML (ref 8–388)
GLOBULIN SER CALC-MCNC: 3.4 G/DL (ref 2.3–3.5)
GLUCOSE SERPL-MCNC: 85 MG/DL (ref 65–100)
HCT VFR BLD AUTO: 32.5 % (ref 35.8–46.3)
HGB BLD-MCNC: 9.7 G/DL (ref 11.7–15.4)
HGB RETIC QN AUTO: 19 PG (ref 29–35)
IMM GRANULOCYTES # BLD AUTO: 0 K/UL (ref 0–0.5)
IMM GRANULOCYTES NFR BLD AUTO: 0 % (ref 0–5)
IMM RETICS NFR: 15.9 % (ref 3–15.9)
IRON SATN MFR SERPL: 5 %
IRON SERPL-MCNC: 18 UG/DL (ref 35–150)
LYMPHOCYTES # BLD: 1.2 K/UL (ref 0.5–4.6)
LYMPHOCYTES NFR BLD: 11 % (ref 13–44)
MCH RBC QN AUTO: 24.1 PG (ref 26.1–32.9)
MCHC RBC AUTO-ENTMCNC: 29.8 G/DL (ref 31.4–35)
MCV RBC AUTO: 80.8 FL (ref 79.6–97.8)
MONOCYTES # BLD: 0.9 K/UL (ref 0.1–1.3)
MONOCYTES NFR BLD: 8 % (ref 4–12)
NEUTS SEG # BLD: 9.1 K/UL (ref 1.7–8.2)
NEUTS SEG NFR BLD: 80 % (ref 43–78)
NRBC # BLD: 0 K/UL (ref 0–0.2)
PLATELET # BLD AUTO: 211 K/UL (ref 150–450)
PMV BLD AUTO: 10.2 FL (ref 9.4–12.3)
POTASSIUM SERPL-SCNC: 3.8 MMOL/L (ref 3.5–5.1)
PROT SERPL-MCNC: 7.3 G/DL (ref 6.3–8.2)
RBC # BLD AUTO: 4.02 M/UL (ref 4.05–5.25)
RETICS # AUTO: 0.05 M/UL (ref 0.03–0.1)
RETICS/RBC NFR AUTO: 1.3 % (ref 0.3–2)
SODIUM SERPL-SCNC: 140 MMOL/L (ref 136–145)
TIBC SERPL-MCNC: 381 UG/DL (ref 250–450)
WBC # BLD AUTO: 11.3 K/UL (ref 4.3–11.1)

## 2019-06-05 PROCEDURE — 83540 ASSAY OF IRON: CPT

## 2019-06-05 PROCEDURE — 85046 RETICYTE/HGB CONCENTRATE: CPT

## 2019-06-05 PROCEDURE — 36415 COLL VENOUS BLD VENIPUNCTURE: CPT

## 2019-06-05 PROCEDURE — 85025 COMPLETE CBC W/AUTO DIFF WBC: CPT

## 2019-06-05 PROCEDURE — 82728 ASSAY OF FERRITIN: CPT

## 2019-06-05 PROCEDURE — 80053 COMPREHEN METABOLIC PANEL: CPT

## 2019-06-11 ENCOUNTER — HOSPITAL ENCOUNTER (OUTPATIENT)
Dept: INFUSION THERAPY | Age: 44
Discharge: HOME | End: 2019-06-11
Payer: COMMERCIAL

## 2019-06-11 VITALS
HEART RATE: 60 BPM | DIASTOLIC BLOOD PRESSURE: 56 MMHG | OXYGEN SATURATION: 100 % | TEMPERATURE: 98.3 F | SYSTOLIC BLOOD PRESSURE: 91 MMHG | BODY MASS INDEX: 23.65 KG/M2 | WEIGHT: 137.8 LBS | RESPIRATION RATE: 16 BRPM

## 2019-06-11 DIAGNOSIS — D50.0 IRON DEFICIENCY ANEMIA DUE TO CHRONIC BLOOD LOSS: Primary | ICD-10-CM

## 2019-06-11 PROCEDURE — 74011250636 HC RX REV CODE- 250/636: Performed by: INTERNAL MEDICINE

## 2019-06-11 PROCEDURE — 96365 THER/PROPH/DIAG IV INF INIT: CPT

## 2019-06-11 PROCEDURE — 96361 HYDRATE IV INFUSION ADD-ON: CPT

## 2019-06-11 RX ORDER — SODIUM CHLORIDE 0.9 % (FLUSH) 0.9 %
10 SYRINGE (ML) INJECTION AS NEEDED
Status: DISCONTINUED | OUTPATIENT
Start: 2019-06-11 | End: 2019-06-12 | Stop reason: HOSPADM

## 2019-06-11 RX ADMIN — SODIUM CHLORIDE 500 ML: 900 INJECTION, SOLUTION INTRAVENOUS at 15:22

## 2019-06-11 RX ADMIN — Medication 10 ML: at 15:22

## 2019-06-11 RX ADMIN — FERRIC CARBOXYMALTOSE INJECTION 750 MG: 50 INJECTION, SOLUTION INTRAVENOUS at 15:27

## 2019-06-11 NOTE — PROGRESS NOTES
Arrived to the [facility]. Assessment and injectafer infusion  completed. Patient tolerated well. Any issues or concerns during appointment: none. Patient aware of next infusion appointment on 06/18/2019 (date) at 1500 (time) with infusion center. Discharged ambulatory, with self. Patient advised to call Dr. Maxime Jaimes office if she has any problems or concerns. Patient verbalized understanding.

## 2019-06-18 ENCOUNTER — HOSPITAL ENCOUNTER (OUTPATIENT)
Dept: INFUSION THERAPY | Age: 44
Discharge: HOME | End: 2019-06-18
Payer: COMMERCIAL

## 2019-06-18 VITALS
RESPIRATION RATE: 18 BRPM | HEART RATE: 100 BPM | TEMPERATURE: 98.4 F | DIASTOLIC BLOOD PRESSURE: 58 MMHG | SYSTOLIC BLOOD PRESSURE: 109 MMHG | OXYGEN SATURATION: 100 %

## 2019-06-18 DIAGNOSIS — D50.0 IRON DEFICIENCY ANEMIA DUE TO CHRONIC BLOOD LOSS: Primary | ICD-10-CM

## 2019-06-18 PROCEDURE — 96365 THER/PROPH/DIAG IV INF INIT: CPT

## 2019-06-18 PROCEDURE — 74011250636 HC RX REV CODE- 250/636: Performed by: INTERNAL MEDICINE

## 2019-06-18 RX ORDER — SODIUM CHLORIDE 0.9 % (FLUSH) 0.9 %
10 SYRINGE (ML) INJECTION AS NEEDED
Status: DISCONTINUED | OUTPATIENT
Start: 2019-06-18 | End: 2019-06-19 | Stop reason: HOSPADM

## 2019-06-18 RX ADMIN — FERRIC CARBOXYMALTOSE INJECTION 750 MG: 50 INJECTION, SOLUTION INTRAVENOUS at 15:28

## 2019-06-18 RX ADMIN — Medication 10 ML: at 15:05

## 2019-06-18 RX ADMIN — SODIUM CHLORIDE 500 ML: 900 INJECTION, SOLUTION INTRAVENOUS at 15:15

## 2019-06-18 NOTE — PROGRESS NOTES
Pt. Discharged ambulatory. Tolerated infusion well. No distress noted. To call physician with any problems or concerns. Understanding voiced.

## 2019-07-16 ENCOUNTER — HOSPITAL ENCOUNTER (OUTPATIENT)
Dept: LAB | Age: 44
Discharge: HOME | End: 2019-07-16
Payer: COMMERCIAL

## 2019-07-16 DIAGNOSIS — D50.0 IRON DEFICIENCY ANEMIA DUE TO CHRONIC BLOOD LOSS: ICD-10-CM

## 2019-07-16 LAB
ALBUMIN SERPL-MCNC: 4.1 G/DL (ref 3.5–5)
ALBUMIN/GLOB SERPL: 1.4 {RATIO} (ref 1.2–3.5)
ALP SERPL-CCNC: 56 U/L (ref 50–136)
ALT SERPL-CCNC: 13 U/L (ref 12–65)
ANION GAP SERPL CALC-SCNC: 5 MMOL/L (ref 7–16)
AST SERPL-CCNC: 12 U/L (ref 15–37)
BASOPHILS # BLD: 0 K/UL (ref 0–0.2)
BASOPHILS NFR BLD: 0 % (ref 0–2)
BILIRUB SERPL-MCNC: 0.4 MG/DL (ref 0.2–1.1)
BUN SERPL-MCNC: 8 MG/DL (ref 6–23)
CALCIUM SERPL-MCNC: 8.4 MG/DL (ref 8.3–10.4)
CHLORIDE SERPL-SCNC: 105 MMOL/L (ref 98–107)
CO2 SERPL-SCNC: 29 MMOL/L (ref 21–32)
CREAT SERPL-MCNC: 0.58 MG/DL (ref 0.6–1)
DIFFERENTIAL METHOD BLD: ABNORMAL
EOSINOPHIL # BLD: 0.1 K/UL (ref 0–0.8)
EOSINOPHIL NFR BLD: 1 % (ref 0.5–7.8)
ERYTHROCYTE [DISTWIDTH] IN BLOOD BY AUTOMATED COUNT: 21.4 % (ref 11.9–14.6)
FERRITIN SERPL-MCNC: 212 NG/ML (ref 8–388)
GLOBULIN SER CALC-MCNC: 2.9 G/DL (ref 2.3–3.5)
GLUCOSE SERPL-MCNC: 79 MG/DL (ref 65–100)
HCT VFR BLD AUTO: 36.7 % (ref 35.8–46.3)
HGB BLD-MCNC: 11.1 G/DL (ref 11.7–15.4)
HGB RETIC QN AUTO: 32 PG (ref 29–35)
IMM GRANULOCYTES # BLD AUTO: 0 K/UL (ref 0–0.5)
IMM GRANULOCYTES NFR BLD AUTO: 0 % (ref 0–5)
IMM RETICS NFR: 12 % (ref 3–15.9)
IRON SATN MFR SERPL: 25 %
IRON SERPL-MCNC: 55 UG/DL (ref 35–150)
LYMPHOCYTES # BLD: 1.5 K/UL (ref 0.5–4.6)
LYMPHOCYTES NFR BLD: 25 % (ref 13–44)
MCH RBC QN AUTO: 26.6 PG (ref 26.1–32.9)
MCHC RBC AUTO-ENTMCNC: 30.2 G/DL (ref 31.4–35)
MCV RBC AUTO: 88 FL (ref 79.6–97.8)
MONOCYTES # BLD: 0.4 K/UL (ref 0.1–1.3)
MONOCYTES NFR BLD: 7 % (ref 4–12)
NEUTS SEG # BLD: 3.9 K/UL (ref 1.7–8.2)
NEUTS SEG NFR BLD: 67 % (ref 43–78)
NRBC # BLD: 0 K/UL (ref 0–0.2)
PLATELET # BLD AUTO: 134 K/UL (ref 150–450)
PLATELET COMMENTS,PCOM: ABNORMAL
PMV BLD AUTO: 11.6 FL (ref 9.4–12.3)
POTASSIUM SERPL-SCNC: 3.5 MMOL/L (ref 3.5–5.1)
PROT SERPL-MCNC: 7 G/DL (ref 6.3–8.2)
RBC # BLD AUTO: 4.17 M/UL (ref 4.05–5.25)
RBC MORPH BLD: ABNORMAL
RETICS # AUTO: 0.06 M/UL (ref 0.03–0.1)
RETICS/RBC NFR AUTO: 1.4 % (ref 0.3–2)
SODIUM SERPL-SCNC: 139 MMOL/L (ref 136–145)
TIBC SERPL-MCNC: 224 UG/DL (ref 250–450)
WBC # BLD AUTO: 5.9 K/UL (ref 4.3–11.1)
WBC MORPH BLD: ABNORMAL

## 2019-07-16 PROCEDURE — 36415 COLL VENOUS BLD VENIPUNCTURE: CPT

## 2019-07-16 PROCEDURE — 83540 ASSAY OF IRON: CPT

## 2019-07-16 PROCEDURE — 80053 COMPREHEN METABOLIC PANEL: CPT

## 2019-07-16 PROCEDURE — 85046 RETICYTE/HGB CONCENTRATE: CPT

## 2019-07-16 PROCEDURE — 85025 COMPLETE CBC W/AUTO DIFF WBC: CPT

## 2019-07-16 PROCEDURE — 82728 ASSAY OF FERRITIN: CPT

## 2019-08-26 ENCOUNTER — HOSPITAL ENCOUNTER (OUTPATIENT)
Dept: MAMMOGRAPHY | Age: 44
Discharge: HOME | End: 2019-08-26
Attending: OBSTETRICS & GYNECOLOGY

## 2019-08-26 DIAGNOSIS — R92.2 BREAST DENSITY: ICD-10-CM

## 2019-08-26 DIAGNOSIS — Z12.39 SCREENING FOR BREAST CANCER: ICD-10-CM

## 2019-08-26 PROCEDURE — 77063 BREAST TOMOSYNTHESIS BI: CPT

## 2020-05-28 ENCOUNTER — HOSPITAL ENCOUNTER (OUTPATIENT)
Dept: INFUSION THERAPY | Age: 45
Discharge: HOME | End: 2020-05-28
Payer: COMMERCIAL

## 2020-05-28 VITALS
OXYGEN SATURATION: 95 % | TEMPERATURE: 98 F | SYSTOLIC BLOOD PRESSURE: 96 MMHG | RESPIRATION RATE: 18 BRPM | HEART RATE: 65 BPM | DIASTOLIC BLOOD PRESSURE: 65 MMHG

## 2020-05-28 DIAGNOSIS — D50.0 IRON DEFICIENCY ANEMIA DUE TO CHRONIC BLOOD LOSS: Primary | ICD-10-CM

## 2020-05-28 PROCEDURE — 96365 THER/PROPH/DIAG IV INF INIT: CPT

## 2020-05-28 PROCEDURE — 74011250636 HC RX REV CODE- 250/636: Performed by: NURSE PRACTITIONER

## 2020-05-28 RX ORDER — SODIUM CHLORIDE 0.9 % (FLUSH) 0.9 %
10 SYRINGE (ML) INJECTION AS NEEDED
Status: ACTIVE | OUTPATIENT
Start: 2020-05-28 | End: 2020-05-28

## 2020-05-28 RX ADMIN — Medication 10 ML: at 09:52

## 2020-05-28 RX ADMIN — Medication 10 ML: at 11:06

## 2020-05-28 RX ADMIN — SODIUM CHLORIDE 750 MG: 900 INJECTION, SOLUTION INTRAVENOUS at 10:16

## 2020-05-28 RX ADMIN — SODIUM CHLORIDE 500 ML: 900 INJECTION, SOLUTION INTRAVENOUS at 10:36

## 2020-05-28 NOTE — PROGRESS NOTES
Arrived to the [facility]. Assessment complete. Injectafercompleted. Patient tolerated without problems. Pt monitored 30 minuets post infusion, no issues noted. Any issues or concerns during appointment: without problems. Patient aware of next infusion appointment on 6/4/2020 (date) at 36 (time)  Discharged ambulatory.

## 2020-06-04 ENCOUNTER — HOSPITAL ENCOUNTER (OUTPATIENT)
Dept: INFUSION THERAPY | Age: 45
Discharge: HOME | End: 2020-06-04
Payer: COMMERCIAL

## 2020-06-04 VITALS
DIASTOLIC BLOOD PRESSURE: 65 MMHG | SYSTOLIC BLOOD PRESSURE: 99 MMHG | OXYGEN SATURATION: 100 % | HEART RATE: 69 BPM | TEMPERATURE: 98.1 F | BODY MASS INDEX: 23.76 KG/M2 | RESPIRATION RATE: 18 BRPM | WEIGHT: 138.4 LBS

## 2020-06-04 DIAGNOSIS — D50.0 IRON DEFICIENCY ANEMIA DUE TO CHRONIC BLOOD LOSS: Primary | ICD-10-CM

## 2020-06-04 PROCEDURE — 74011250636 HC RX REV CODE- 250/636: Performed by: NURSE PRACTITIONER

## 2020-06-04 PROCEDURE — 96365 THER/PROPH/DIAG IV INF INIT: CPT

## 2020-06-04 RX ADMIN — SODIUM CHLORIDE 500 ML: 900 INJECTION, SOLUTION INTRAVENOUS at 16:40

## 2020-06-04 RX ADMIN — SODIUM CHLORIDE 750 MG: 900 INJECTION, SOLUTION INTRAVENOUS at 16:50

## 2020-06-04 NOTE — PROGRESS NOTES
[facility] declined to participate in the [place] [facility] [place] [employer] Savings Program and the FAP application process. The patient was given information on both programs along with my business card to contact me if she changes her mind.

## 2020-06-04 NOTE — PROGRESS NOTES
Arrived to the [facility]. Iron completed. Patient tolerated well. Observed patient x 30 minutes, no reactions. PIV removed intact, site clear. Any issues or concerns during appointment: None. Patient aware no future infusion appointments. Patient to follow up with physician. Discharged ambulatory in stable condition.

## 2020-06-24 ENCOUNTER — HOSPITAL ENCOUNTER (OUTPATIENT)
Dept: SURGERY | Age: 45
Discharge: HOME | End: 2020-06-24

## 2020-06-30 VITALS — WEIGHT: 137 LBS | BODY MASS INDEX: 23.39 KG/M2 | HEIGHT: 64 IN

## 2020-06-30 NOTE — PERIOP NOTES
Patient verified name and     Order for consent NOT found in EHR; patient verified hysterectomy but was unaware of possible BSO- pt states she has preop appt with surgeon tomorrow and will discuss procedure. Type 2 surgery. Labs per surgeon: no orders in EMR at this time- preop appt 2020 at [address] per anesthesia protocol: hgb needed- order placed in EMR on hold for arrival  EKG: done 2020 at [facility]- tracing in EMR and result within anesthesia guidelines    Pt had preop clearance appt at [facility] with Dr Ofelia Avalos 2020. Office note states:   Low perioperative cardiovascular risk. Recommend support/compression socks with chronic hypotension, asymptomatic. Patient to work on oral hydration, limited by prior gastric sleeve. Benign isolated ectopy d/t anemia, will improve with better iron stores. Patient informed a Covid swab is required 7 days prior to surgery. The testing center is located at the [address], [place]. For questions or concerns the patient is advised to call [phone]. An appointment is required and the testing clinic is closed from  for lunch and on weekends. Appointment date/time 2020 at [phone] found in EHR and provided to patient. Patient informed of ERAS/GYN class on 2020 at 0830 at which time labs will be drawn. Patient will also receive all patient education and hospital approved surgical skin cleanser to use per hospital policy. ERAS instructions will be provided. Patient instructed to hold all vitamins 7 days prior to surgery and NSAIDS 5 days prior to surgery, patient verbalized understanding. Patient instructed to continue previous medications as prescribed prior to surgery and to take the following medications the day of surgery according to anesthesia guidelines with a small sip of water: progesterone, valtrex.      Patient answered medical/surgical history questions at their best of ability. All prior to admission medications documented in [facility].

## 2020-06-30 NOTE — PERIOP NOTES
Enhanced Recovery After GYN Surgery: non-diabetic patients    Drink Ensure Enlive - one bottle twice daily for five days starting on 7/3/2020 and stopping on 7/7/2020. Do not drink any Ensure Enlive the day before surgery 7/8/2020. Ensure Enlive is the preferred formula over other Ensure formulas as it is the only one that contains CaHMB which helps maintain and rebuild muscle health. The night before surgery 7/8/2020- drink 2 bottles of the Ensure Pre-Surgery drink. The morning of surgery 7/9/2020- drink one bottle of the Ensure Pre-Surgery drink while on your way to the hospital. Drink this over 5-10 minutes. Drink nothing else after drinking the pre-surgical drink the morning of surgery. Bring your patient handbook with you to the hospital.    Things to remember:    1. You will be given clear liquids to drink, advancing diet as tolerated    2. You will be up and moving around with assistance 2-4 hours after surgery. 3. You will be given regularly scheduled pain medications (NSAIDS, Tylenol, Gabapentin) with narcotics as needed. 4. You may be able to go home that night if the surgeon okays and you are up and eating and drinking. Otherwise, your discharge will be the following morning around lunch time. 5. Continue drinking Ensure Enlive for 5 days after surgery.

## 2020-07-01 ENCOUNTER — HOSPITAL ENCOUNTER (OUTPATIENT)
Dept: SURGERY | Age: 45
Discharge: HOME | End: 2020-07-01
Payer: COMMERCIAL

## 2020-07-01 LAB — HGB BLD-MCNC: 10.3 G/DL (ref 11.7–15.4)

## 2020-07-01 PROCEDURE — 36415 COLL VENOUS BLD VENIPUNCTURE: CPT

## 2020-07-01 PROCEDURE — 85018 HEMOGLOBIN: CPT

## 2020-07-03 NOTE — H&P (VIEW-ONLY)
Pre op  Exam 
 
Laurie Grigsby 39 y.o. 
1975 
[id number] Presents for pre op Menorrhagia with regular cycles, anemia, IV and p.o. iron with hematology, Dr. Flaca Sumner. Wants definitive therapy Patient's last menstrual period was 2020.  
 
2019 rx Lo Seasonique. 2020 reported she d/cd OCs post VB x 1 m.  
 
2019 pap nl 
2018:  Endometrial  NO HYPERPLASIA OR CARCINOMA. SECRETORY ENDOMETRIUM. 2020 US: [address] today:  ENLARGED UT 10/8/7  WITH MULTIPLE FIBROIDS SEEN. LARGEST 5 ARE MEASURED. LARGEST IS POSTERIOR INTRAMURAL. ENDOMETRIUM= 7.7MM. FIBROIDS DISTORT THE LINING. RT OV- SMALL, SIMPLE CYST. LT OV- WNL. SEEN TA ONLY. 
MIN. FF. Fibroid 1 3.49 cm 2.90 cm 3.19 cm 15.378 cm³ Fibroid 2 3.06 cm 2.57 cm 2.82 cm 10.610 cm³ Fibroid 3 2.28 cm 1.64 cm 1.96 cm 3.224 cm³ Fibroid 4 1.87 cm 1.58 cm 1.73 cm 2.439 cm³ Fibroid 5 1.82 cm 1.65 cm 1.74 cm 2.605 cm OB History Erroll Hazard 1 Para  
0 Term  
0  AB Living  
1 SAB  
   
 TAB Ectopic Molar Multiple Live Births Past Medical History:  
Diagnosis Date  Abnormal pap  LGSIL  Anemia  Contraception   
 vasectomy  Genital HSV  GERD (gastroesophageal reflux disease)   
 no meds  History of colonoscopy 2018 Dr. Barraza Daily, nl (see media note), R   Hypotension   
 asymptomatic  Obstruction of fallopian tube   
 per pt has \"1 good tube\"  Weight loss 80lbs weight loss after gastric sleeve Past Surgical History:  
Procedure Laterality Date  HX  SECTION    HX GASTRIC BYPASS  2014  
 gastric sleeve- Flynn Perdomo MYOMECTOMY  age Lexx Heys 21s\" also \"unblocked her FT\"  HX TONSILLECTOMY Family History Problem Relation Age of Onset  Heart Disease Maternal Grandmother  Hypertension Maternal Grandmother  Heart Disease Maternal Grandfather  Hypertension Maternal Grandfather  Breast Cancer Neg Hx  Colon Cancer Neg Hx  Deep Vein Thrombosis Neg Hx   
 Ovarian Cancer Neg Hx  Prostate Cancer Neg Hx  Pulmonary Embolism Neg Hx Social History Socioeconomic History  Marital status: SINGLE Spouse name: Not on file  Number of children: Not on file  Years of education: Not on file  Highest education level: Not on file Occupational History  Occupation: planner Social Needs  Financial resource strain: Not on file  Food insecurity Worry: Not on file Inability: Not on file  Transportation needs Medical: Not on file Non-medical: Not on file Tobacco Use  Smoking status: Never Smoker  Smokeless tobacco: Never Used Substance and Sexual Activity  Alcohol use: No  
  Comment: rare- 2 drinks/month  Drug use: No  
 Sexual activity: Yes  
  Partners: Male Birth control/protection: None Lifestyle  Physical activity Days per week: Not on file Minutes per session: Not on file  Stress: Not on file Relationships  Social connections Talks on phone: Not on file Gets together: Not on file Attends Judaism service: Not on file Active member of club or organization: Not on file Attends meetings of clubs or organizations: Not on file Relationship status: Not on file  Intimate partner violence Fear of current or ex partner: Not on file Emotionally abused: Not on file Physically abused: Not on file Forced sexual activity: Not on file Other Topics Concern  Not on file Social History Narrative 1. Use large speculum. 2.  sister, Florentin Rodas #[id number] and mom is Alok Pak #[id number] (both Kofoed's pts) 3. PCP  Dr. Shona Escobar ([facility]), GI Dr. Seda Sandhu [id number] normal EGD Current Outpatient Medications Medication Sig  progesterone (PROMETRIUM) 100 mg capsule Take 1 Cap by mouth daily.  OTHER Iron gummy daily  valACYclovir (VALTREX) 500 mg tablet Take 1 Tab by mouth daily. No current facility-administered medications for this visit. Review of Systems Constitutional: Negative. HENT: Negative. Eyes: Negative. Respiratory: Negative. Cardiovascular: Negative. Gastrointestinal: negative Genitourinary: Negative. Musculoskeletal: Negative. Skin: Negative. Neurological: Negative. Endo/Heme/Allergies: Negative. Psychiatric/Behavioral: Negative. Physical Exam: 
Visit Vitals /62 Ht 5' 4\" (1.626 m) Wt 142 lb (64.4 kg) LMP 06/18/2020 BMI 24.37 kg/m² Madison Pandya female who appears pleasant, in no apparent distress, her given age, well developed, well nourished and with good attention to hygiene and body habitus. Oriented to person, place and time. Mood and affect normal and appropriate to situation. Head is normocephalic, atraumatic, without any gross head or neck masses. Neck: Neck exam reveals no abnormalities. Thyroid examination reveals no abnormalities. Lymph nodes:  
Neck lymph nodes are normal.  
No supraclavicular lymphadenopathy noted. No axillary lymphadenopathy noted. No groin lymphadenopathy noted. Respiratory: Assessment of respiratory effort reveals even and non labored respirations. Lungs CTA. Cardiovascular: Heart auscultation reveals no murmurs, gallop, rubs or clicks. Skin: No skin rash, abnormal appearing nevi, subcutaneous nodules, lesions or ulcers observed. Abdomen: Abdomen soft, non tender, bowel sounds present x 4 without palpable masses. Examination for hernia is negative. Palpation of liver reveals no abnormalities with respect to size, tenderness or masses. Palpation of spleen reveals no abnormalities with respect to size, tenderness or masses. Breast: Symmetrical, no: dimpling, retractions, adenopathy, dominant masses or nipple discharge elicited. Breasts show no palpable masses or tenderness. Genitourinary:External genitalia are normal in appearance. Examination of urethral meatus reveals location normal and size normal.  
Examination of urethra shows no abnormalities. Examination of vaginal vault reveals no abnormalities. Cervix: shows no pathology. Uterus: Uterine portion of bimanual exam reveals contour normal, shape regular and size normal.  
Adnexa and parametria show no masses, tenderness, organomegaly or nodularity. Examination of anus and perineum shows no abnormalities. Rectal exam reveals normal sphincter tone without presence of hemorrhoids or masses. ASSESSMENT and PLAN 1. Pre op:  TLH w/ Bilateral salpingectomy, possible oophorectomy. Long discussion with patient. Risks, benefits and alternatives to surgery were discussed. Understands she may require HRT post op if BSO is indicated. She prefers to keep her ovaries if possible. PSH:  , gastric bypass, myomectomy and FTs \"unblocked\". She understands that complications aren't anticipated however if complications occur they could cause: pain, bleeding, infection, damage to surrounding structures including:  bowel, bladder, blood vessels and nerves. She understands that surgery carries with it a risk of VTE and death. She understands that surgery could necessitate:  transfusion, SHYLA, need for additional surgery, including ureteral stint, prolonged armijo drainage, colostomy &/or other procedures as indicated. She also understands surgery could result in a change in bowel function, bladder function and sexual function. In addition there could be pain and infection. Pt understands and consents. Plan to conserve her ovaries as long as they appear normal, Post op course  also discussed at length. Advised a 4 -6 wk recovery.   Concerns regarding the amount of time necessary for adequate cuff healing and recovery of the strength/integrity of the vaginal cuff discussed. Pt advised I'd prefer she allow a full 6 wks of pelvic rest and minimal strenuous activity for the cuff to regain some reasonable strength. Plan one night in the hospital if all goes as planned. Discussed with patient the importance of minimizing her physical activity for several weeks postop, no heavy lifting, nothing >10 lbs, and to stay at pelvic rest (nothing in the vagina) until cleared postoperatively- usually at 6-8 weeks postop. All questions answered. Pre op:  Hgb 11, plt 184k. Low perioperative cardiovascular risk. Recommend support/compression socks with chronic hypotension, asymptomatic. Patient to work on oral hydration, limited by prior gastric sleeve. Benign isolated ectopy d/t anemia, will improve with better iron stores Lias Reyes MD 
06/12/20 Fiance, \"En-shay\" ICD-10-CM ICD-9-CM 1. Iron deficiency anemia due to chronic blood loss D50.0 280.0 CULTURE, URINE  
   METABOLIC PANEL, COMPREHENSIVE  
   CBC WITH AUTOMATED DIFF 2. Fibroids D21.9 215.9 CULTURE, URINE  
   METABOLIC PANEL, COMPREHENSIVE  
   CBC WITH AUTOMATED DIFF 3. Menorrhagia with regular cycle N92.0 626.2 CULTURE, URINE  
   METABOLIC PANEL, COMPREHENSIVE  
   CBC WITH AUTOMATED DIFF 4. Encounter for pre-operative laboratory testing Z01.812 V72.84 CULTURE, URINE  
   METABOLIC PANEL, COMPREHENSIVE  
   CBC WITH AUTOMATED DIFF Orders Placed This Encounter  CULTURE, URINE  
 METABOLIC PANEL, COMPREHENSIVE  
 CBC WITH AUTOMATED DIFF Signed by:  Concepción Mckeon MD, [address] [place] [address]

## 2020-07-08 ENCOUNTER — ANESTHESIA EVENT (OUTPATIENT)
Dept: SURGERY | Age: 45
End: 2020-07-08
Payer: COMMERCIAL

## 2020-07-09 ENCOUNTER — HOSPITAL ENCOUNTER (OUTPATIENT)
Age: 45
Setting detail: OBSERVATION
Discharge: HOME | End: 2020-07-10
Attending: OBSTETRICS & GYNECOLOGY | Admitting: OBSTETRICS & GYNECOLOGY
Payer: COMMERCIAL

## 2020-07-09 ENCOUNTER — ANESTHESIA (OUTPATIENT)
Dept: SURGERY | Age: 45
End: 2020-07-09
Payer: COMMERCIAL

## 2020-07-09 DIAGNOSIS — G89.18 POST-OP PAIN: Primary | ICD-10-CM

## 2020-07-09 PROBLEM — D21.9 FIBROIDS: Status: ACTIVE | Noted: 2020-07-09

## 2020-07-09 PROBLEM — D64.9 ANEMIA: Status: ACTIVE | Noted: 2020-07-09

## 2020-07-09 LAB
ABO + RH BLD: NORMAL
BLOOD GROUP ANTIBODIES SERPL: NORMAL
HCG UR QL: NEGATIVE
SPECIMEN EXP DATE BLD: NORMAL

## 2020-07-09 PROCEDURE — 77030031139 HC SUT VCRL2 J&J -A: Performed by: OBSTETRICS & GYNECOLOGY

## 2020-07-09 PROCEDURE — 77030039147 HC PWDR HEMSTS SURGICEL JNJ -D: Performed by: OBSTETRICS & GYNECOLOGY

## 2020-07-09 PROCEDURE — 77030018720 HC DVC LIGSR ATLS COVD -E: Performed by: OBSTETRICS & GYNECOLOGY

## 2020-07-09 PROCEDURE — 77030037285 HC MANIP UTER DELINTR ADVINCULA DISP COOP -C: Performed by: OBSTETRICS & GYNECOLOGY

## 2020-07-09 PROCEDURE — 99218 HC RM OBSERVATION: CPT

## 2020-07-09 PROCEDURE — 74011000250 HC RX REV CODE- 250: Performed by: NURSE ANESTHETIST, CERTIFIED REGISTERED

## 2020-07-09 PROCEDURE — 74011000250 HC RX REV CODE- 250: Performed by: ANESTHESIOLOGY

## 2020-07-09 PROCEDURE — 74011250637 HC RX REV CODE- 250/637: Performed by: OBSTETRICS & GYNECOLOGY

## 2020-07-09 PROCEDURE — 74011250636 HC RX REV CODE- 250/636: Performed by: NURSE ANESTHETIST, CERTIFIED REGISTERED

## 2020-07-09 PROCEDURE — 77030003578 HC NDL INSUF VERES AMR -B: Performed by: OBSTETRICS & GYNECOLOGY

## 2020-07-09 PROCEDURE — 77030008522 HC TBNG INSUF LAPRO STRY -B: Performed by: OBSTETRICS & GYNECOLOGY

## 2020-07-09 PROCEDURE — 74011250637 HC RX REV CODE- 250/637: Performed by: ANESTHESIOLOGY

## 2020-07-09 PROCEDURE — 77030020829: Performed by: OBSTETRICS & GYNECOLOGY

## 2020-07-09 PROCEDURE — 77030041236 HC APPL SURG ENDO JNJ -B: Performed by: OBSTETRICS & GYNECOLOGY

## 2020-07-09 PROCEDURE — 77030008756 HC TU IRR SUC STRY -B: Performed by: OBSTETRICS & GYNECOLOGY

## 2020-07-09 PROCEDURE — 77030018836 HC SOL IRR NACL ICUM -A: Performed by: OBSTETRICS & GYNECOLOGY

## 2020-07-09 PROCEDURE — 74011250636 HC RX REV CODE- 250/636: Performed by: OBSTETRICS & GYNECOLOGY

## 2020-07-09 PROCEDURE — 77030008606 HC TRCR ENDOSC KII AMR -B: Performed by: OBSTETRICS & GYNECOLOGY

## 2020-07-09 PROCEDURE — 76060000037 HC ANESTHESIA 3 TO 3.5 HR: Performed by: OBSTETRICS & GYNECOLOGY

## 2020-07-09 PROCEDURE — 88307 TISSUE EXAM BY PATHOLOGIST: CPT

## 2020-07-09 PROCEDURE — 76210000006 HC OR PH I REC 0.5 TO 1 HR: Performed by: OBSTETRICS & GYNECOLOGY

## 2020-07-09 PROCEDURE — 77030039425 HC BLD LARYNG TRULITE DISP TELE -A: Performed by: ANESTHESIOLOGY

## 2020-07-09 PROCEDURE — 74011250636 HC RX REV CODE- 250/636: Performed by: ANESTHESIOLOGY

## 2020-07-09 PROCEDURE — 77030037088 HC TUBE ENDOTRACH ORAL NSL COVD-A: Performed by: ANESTHESIOLOGY

## 2020-07-09 PROCEDURE — 77030010507 HC ADH SKN DERMBND J&J -B: Performed by: OBSTETRICS & GYNECOLOGY

## 2020-07-09 PROCEDURE — 76010000173 HC OR TIME 3 TO 3.5 HR INTENSV-TIER 1: Performed by: OBSTETRICS & GYNECOLOGY

## 2020-07-09 PROCEDURE — 86900 BLOOD TYPING SEROLOGIC ABO: CPT

## 2020-07-09 PROCEDURE — 77030040830 HC CATH URETH FOL MDII -A: Performed by: OBSTETRICS & GYNECOLOGY

## 2020-07-09 PROCEDURE — 74011000250 HC RX REV CODE- 250: Performed by: OBSTETRICS & GYNECOLOGY

## 2020-07-09 PROCEDURE — 77030019908 HC STETH ESOPH SIMS -A: Performed by: ANESTHESIOLOGY

## 2020-07-09 PROCEDURE — 77030002933 HC SUT MCRYL J&J -A: Performed by: OBSTETRICS & GYNECOLOGY

## 2020-07-09 PROCEDURE — 51798 US URINE CAPACITY MEASURE: CPT

## 2020-07-09 PROCEDURE — 77030040922 HC BLNKT HYPOTHRM STRY -A: Performed by: ANESTHESIOLOGY

## 2020-07-09 PROCEDURE — 77030022704 HC SUT VLOC COVD -B: Performed by: OBSTETRICS & GYNECOLOGY

## 2020-07-09 PROCEDURE — 77030012770 HC TRCR OPT FX AMR -B: Performed by: OBSTETRICS & GYNECOLOGY

## 2020-07-09 PROCEDURE — 81025 URINE PREGNANCY TEST: CPT

## 2020-07-09 PROCEDURE — 77030040361 HC SLV COMPR DVT MDII -B: Performed by: OBSTETRICS & GYNECOLOGY

## 2020-07-09 PROCEDURE — 77030034154 HC SHR COAG HARM ACE J&J -F: Performed by: OBSTETRICS & GYNECOLOGY

## 2020-07-09 RX ORDER — SODIUM CHLORIDE 0.9 % (FLUSH) 0.9 %
5-40 SYRINGE (ML) INJECTION EVERY 8 HOURS
Status: DISCONTINUED | OUTPATIENT
Start: 2020-07-09 | End: 2020-07-10 | Stop reason: HOSPADM

## 2020-07-09 RX ORDER — SODIUM CHLORIDE 0.9 % (FLUSH) 0.9 %
5-40 SYRINGE (ML) INJECTION EVERY 8 HOURS
Status: DISCONTINUED | OUTPATIENT
Start: 2020-07-09 | End: 2020-07-09 | Stop reason: HOSPADM

## 2020-07-09 RX ORDER — SODIUM CHLORIDE, SODIUM LACTATE, POTASSIUM CHLORIDE, CALCIUM CHLORIDE 600; 310; 30; 20 MG/100ML; MG/100ML; MG/100ML; MG/100ML
25 INJECTION, SOLUTION INTRAVENOUS CONTINUOUS
Status: DISCONTINUED | OUTPATIENT
Start: 2020-07-09 | End: 2020-07-09 | Stop reason: HOSPADM

## 2020-07-09 RX ORDER — HYDROMORPHONE HYDROCHLORIDE 2 MG/ML
0.5 INJECTION, SOLUTION INTRAMUSCULAR; INTRAVENOUS; SUBCUTANEOUS
Status: DISCONTINUED | OUTPATIENT
Start: 2020-07-09 | End: 2020-07-09 | Stop reason: HOSPADM

## 2020-07-09 RX ORDER — OXYCODONE HYDROCHLORIDE 5 MG/1
5-10 TABLET ORAL
Status: DISCONTINUED | OUTPATIENT
Start: 2020-07-09 | End: 2020-07-10 | Stop reason: HOSPADM

## 2020-07-09 RX ORDER — FENTANYL CITRATE 50 UG/ML
INJECTION, SOLUTION INTRAMUSCULAR; INTRAVENOUS AS NEEDED
Status: DISCONTINUED | OUTPATIENT
Start: 2020-07-09 | End: 2020-07-09 | Stop reason: HOSPADM

## 2020-07-09 RX ORDER — ACETAMINOPHEN 500 MG
1000 TABLET ORAL ONCE
Status: COMPLETED | OUTPATIENT
Start: 2020-07-09 | End: 2020-07-09

## 2020-07-09 RX ORDER — LIDOCAINE HYDROCHLORIDE 10 MG/ML
0.1 INJECTION INFILTRATION; PERINEURAL AS NEEDED
Status: DISCONTINUED | OUTPATIENT
Start: 2020-07-09 | End: 2020-07-09 | Stop reason: HOSPADM

## 2020-07-09 RX ORDER — KETAMINE HYDROCHLORIDE 50 MG/ML
INJECTION, SOLUTION INTRAMUSCULAR; INTRAVENOUS AS NEEDED
Status: DISCONTINUED | OUTPATIENT
Start: 2020-07-09 | End: 2020-07-09 | Stop reason: HOSPADM

## 2020-07-09 RX ORDER — OXYCODONE AND ACETAMINOPHEN 5; 325 MG/1; MG/1
1 TABLET ORAL AS NEEDED
Status: DISCONTINUED | OUTPATIENT
Start: 2020-07-09 | End: 2020-07-09 | Stop reason: HOSPADM

## 2020-07-09 RX ORDER — DOCUSATE SODIUM 100 MG/1
100 CAPSULE, LIQUID FILLED ORAL 2 TIMES DAILY
Status: DISCONTINUED | OUTPATIENT
Start: 2020-07-09 | End: 2020-07-10 | Stop reason: HOSPADM

## 2020-07-09 RX ORDER — NALOXONE HYDROCHLORIDE 0.4 MG/ML
0.2 INJECTION, SOLUTION INTRAMUSCULAR; INTRAVENOUS; SUBCUTANEOUS AS NEEDED
Status: DISCONTINUED | OUTPATIENT
Start: 2020-07-09 | End: 2020-07-09 | Stop reason: HOSPADM

## 2020-07-09 RX ORDER — CEFAZOLIN SODIUM/WATER 2 G/20 ML
2 SYRINGE (ML) INTRAVENOUS ONCE
Status: COMPLETED | OUTPATIENT
Start: 2020-07-09 | End: 2020-07-09

## 2020-07-09 RX ORDER — DIPHENHYDRAMINE HCL 25 MG
25 CAPSULE ORAL
Status: DISCONTINUED | OUTPATIENT
Start: 2020-07-09 | End: 2020-07-10 | Stop reason: HOSPADM

## 2020-07-09 RX ORDER — SCOLOPAMINE TRANSDERMAL SYSTEM 1 MG/1
1 PATCH, EXTENDED RELEASE TRANSDERMAL ONCE
Status: DISCONTINUED | OUTPATIENT
Start: 2020-07-09 | End: 2020-07-10 | Stop reason: HOSPADM

## 2020-07-09 RX ORDER — SODIUM CHLORIDE 0.9 % (FLUSH) 0.9 %
5-40 SYRINGE (ML) INJECTION AS NEEDED
Status: DISCONTINUED | OUTPATIENT
Start: 2020-07-09 | End: 2020-07-09 | Stop reason: HOSPADM

## 2020-07-09 RX ORDER — DEXTROSE, SODIUM CHLORIDE, SODIUM LACTATE, POTASSIUM CHLORIDE, AND CALCIUM CHLORIDE 5; .6; .31; .03; .02 G/100ML; G/100ML; G/100ML; G/100ML; G/100ML
125 INJECTION, SOLUTION INTRAVENOUS CONTINUOUS
Status: DISCONTINUED | OUTPATIENT
Start: 2020-07-09 | End: 2020-07-10 | Stop reason: HOSPADM

## 2020-07-09 RX ORDER — MIDAZOLAM HYDROCHLORIDE 1 MG/ML
2 INJECTION, SOLUTION INTRAMUSCULAR; INTRAVENOUS
Status: COMPLETED | OUTPATIENT
Start: 2020-07-09 | End: 2020-07-09

## 2020-07-09 RX ORDER — NEOSTIGMINE METHYLSULFATE 1 MG/ML
INJECTION, SOLUTION INTRAVENOUS AS NEEDED
Status: DISCONTINUED | OUTPATIENT
Start: 2020-07-09 | End: 2020-07-09 | Stop reason: HOSPADM

## 2020-07-09 RX ORDER — HYDROMORPHONE HYDROCHLORIDE 1 MG/ML
1 INJECTION, SOLUTION INTRAMUSCULAR; INTRAVENOUS; SUBCUTANEOUS
Status: DISCONTINUED | OUTPATIENT
Start: 2020-07-09 | End: 2020-07-10 | Stop reason: HOSPADM

## 2020-07-09 RX ORDER — ACETAMINOPHEN 500 MG
1000 TABLET ORAL EVERY 6 HOURS
Status: DISCONTINUED | OUTPATIENT
Start: 2020-07-09 | End: 2020-07-10 | Stop reason: HOSPADM

## 2020-07-09 RX ORDER — KETOROLAC TROMETHAMINE 30 MG/ML
30 INJECTION, SOLUTION INTRAMUSCULAR; INTRAVENOUS EVERY 6 HOURS
Status: DISCONTINUED | OUTPATIENT
Start: 2020-07-09 | End: 2020-07-10 | Stop reason: HOSPADM

## 2020-07-09 RX ORDER — ATROPA BELLADONNA AND OPIUM 16.2; 6 MG/1; MG/1
SUPPOSITORY RECTAL AS NEEDED
Status: DISCONTINUED | OUTPATIENT
Start: 2020-07-09 | End: 2020-07-09 | Stop reason: HOSPADM

## 2020-07-09 RX ORDER — ROCURONIUM BROMIDE 10 MG/ML
INJECTION, SOLUTION INTRAVENOUS AS NEEDED
Status: DISCONTINUED | OUTPATIENT
Start: 2020-07-09 | End: 2020-07-09 | Stop reason: HOSPADM

## 2020-07-09 RX ORDER — SODIUM CHLORIDE, SODIUM LACTATE, POTASSIUM CHLORIDE, CALCIUM CHLORIDE 600; 310; 30; 20 MG/100ML; MG/100ML; MG/100ML; MG/100ML
75 INJECTION, SOLUTION INTRAVENOUS CONTINUOUS
Status: DISCONTINUED | OUTPATIENT
Start: 2020-07-09 | End: 2020-07-09 | Stop reason: HOSPADM

## 2020-07-09 RX ORDER — ONDANSETRON 2 MG/ML
INJECTION INTRAMUSCULAR; INTRAVENOUS AS NEEDED
Status: DISCONTINUED | OUTPATIENT
Start: 2020-07-09 | End: 2020-07-09 | Stop reason: HOSPADM

## 2020-07-09 RX ORDER — LIDOCAINE HYDROCHLORIDE 20 MG/ML
INJECTION, SOLUTION EPIDURAL; INFILTRATION; INTRACAUDAL; PERINEURAL AS NEEDED
Status: DISCONTINUED | OUTPATIENT
Start: 2020-07-09 | End: 2020-07-09 | Stop reason: HOSPADM

## 2020-07-09 RX ORDER — NALOXONE HYDROCHLORIDE 0.4 MG/ML
0.4 INJECTION, SOLUTION INTRAMUSCULAR; INTRAVENOUS; SUBCUTANEOUS AS NEEDED
Status: DISCONTINUED | OUTPATIENT
Start: 2020-07-09 | End: 2020-07-10 | Stop reason: HOSPADM

## 2020-07-09 RX ORDER — ZOLPIDEM TARTRATE 5 MG/1
5 TABLET ORAL
Status: DISCONTINUED | OUTPATIENT
Start: 2020-07-09 | End: 2020-07-10 | Stop reason: HOSPADM

## 2020-07-09 RX ORDER — KETOROLAC TROMETHAMINE 30 MG/ML
INJECTION, SOLUTION INTRAMUSCULAR; INTRAVENOUS AS NEEDED
Status: DISCONTINUED | OUTPATIENT
Start: 2020-07-09 | End: 2020-07-09 | Stop reason: HOSPADM

## 2020-07-09 RX ORDER — GLYCOPYRROLATE 0.2 MG/ML
INJECTION INTRAMUSCULAR; INTRAVENOUS AS NEEDED
Status: DISCONTINUED | OUTPATIENT
Start: 2020-07-09 | End: 2020-07-09 | Stop reason: HOSPADM

## 2020-07-09 RX ORDER — KETAMINE HYDROCHLORIDE 50 MG/ML
INJECTION, SOLUTION INTRAMUSCULAR; INTRAVENOUS AS NEEDED
Status: DISCONTINUED | OUTPATIENT
Start: 2020-07-09 | End: 2020-07-09

## 2020-07-09 RX ORDER — ONDANSETRON 2 MG/ML
4 INJECTION INTRAMUSCULAR; INTRAVENOUS
Status: DISCONTINUED | OUTPATIENT
Start: 2020-07-09 | End: 2020-07-10 | Stop reason: HOSPADM

## 2020-07-09 RX ORDER — BUPIVACAINE HYDROCHLORIDE 5 MG/ML
INJECTION, SOLUTION EPIDURAL; INTRACAUDAL AS NEEDED
Status: DISCONTINUED | OUTPATIENT
Start: 2020-07-09 | End: 2020-07-09 | Stop reason: HOSPADM

## 2020-07-09 RX ORDER — GABAPENTIN 300 MG/1
600 CAPSULE ORAL ONCE
Status: COMPLETED | OUTPATIENT
Start: 2020-07-09 | End: 2020-07-09

## 2020-07-09 RX ORDER — DEXAMETHASONE SODIUM PHOSPHATE 4 MG/ML
INJECTION, SOLUTION INTRA-ARTICULAR; INTRALESIONAL; INTRAMUSCULAR; INTRAVENOUS; SOFT TISSUE AS NEEDED
Status: DISCONTINUED | OUTPATIENT
Start: 2020-07-09 | End: 2020-07-09 | Stop reason: HOSPADM

## 2020-07-09 RX ORDER — PROPOFOL 10 MG/ML
INJECTION, EMULSION INTRAVENOUS AS NEEDED
Status: DISCONTINUED | OUTPATIENT
Start: 2020-07-09 | End: 2020-07-09 | Stop reason: HOSPADM

## 2020-07-09 RX ORDER — GABAPENTIN 300 MG/1
300 CAPSULE ORAL 3 TIMES DAILY
Status: DISCONTINUED | OUTPATIENT
Start: 2020-07-09 | End: 2020-07-10 | Stop reason: HOSPADM

## 2020-07-09 RX ORDER — SODIUM CHLORIDE 0.9 % (FLUSH) 0.9 %
5-40 SYRINGE (ML) INJECTION AS NEEDED
Status: DISCONTINUED | OUTPATIENT
Start: 2020-07-09 | End: 2020-07-10 | Stop reason: HOSPADM

## 2020-07-09 RX ADMIN — ROCURONIUM BROMIDE 50 MG: 10 INJECTION, SOLUTION INTRAVENOUS at 07:31

## 2020-07-09 RX ADMIN — KETAMINE HYDROCHLORIDE 35 MG: 50 INJECTION INTRAMUSCULAR; INTRAVENOUS at 07:31

## 2020-07-09 RX ADMIN — PHENYLEPHRINE HYDROCHLORIDE 50 MCG: 10 INJECTION INTRAVENOUS at 08:09

## 2020-07-09 RX ADMIN — GABAPENTIN 300 MG: 300 CAPSULE ORAL at 16:16

## 2020-07-09 RX ADMIN — ONDANSETRON 4 MG: 2 INJECTION INTRAMUSCULAR; INTRAVENOUS at 09:50

## 2020-07-09 RX ADMIN — PROPOFOL 200 MG: 10 INJECTION, EMULSION INTRAVENOUS at 07:31

## 2020-07-09 RX ADMIN — GABAPENTIN 300 MG: 300 CAPSULE ORAL at 22:24

## 2020-07-09 RX ADMIN — SODIUM CHLORIDE, SODIUM LACTATE, POTASSIUM CHLORIDE, AND CALCIUM CHLORIDE 75 ML/HR: 600; 310; 30; 20 INJECTION, SOLUTION INTRAVENOUS at 10:49

## 2020-07-09 RX ADMIN — LIDOCAINE HYDROCHLORIDE 0.1 ML: 10 INJECTION, SOLUTION INFILTRATION; PERINEURAL at 06:21

## 2020-07-09 RX ADMIN — ACETAMINOPHEN 1000 MG: 500 TABLET, FILM COATED ORAL at 17:16

## 2020-07-09 RX ADMIN — Medication 10 ML: at 15:10

## 2020-07-09 RX ADMIN — PHENYLEPHRINE HYDROCHLORIDE 50 MCG: 10 INJECTION INTRAVENOUS at 09:20

## 2020-07-09 RX ADMIN — DEXAMETHASONE SODIUM PHOSPHATE 10 MG: 4 INJECTION, SOLUTION INTRAMUSCULAR; INTRAVENOUS at 07:40

## 2020-07-09 RX ADMIN — SODIUM CHLORIDE, SODIUM LACTATE, POTASSIUM CHLORIDE, AND CALCIUM CHLORIDE 25 ML/HR: 600; 310; 30; 20 INJECTION, SOLUTION INTRAVENOUS at 06:06

## 2020-07-09 RX ADMIN — OXYCODONE 10 MG: 5 TABLET ORAL at 12:00

## 2020-07-09 RX ADMIN — PHENYLEPHRINE HYDROCHLORIDE 50 MCG: 10 INJECTION INTRAVENOUS at 08:49

## 2020-07-09 RX ADMIN — PHENYLEPHRINE HYDROCHLORIDE 50 MCG: 10 INJECTION INTRAVENOUS at 07:56

## 2020-07-09 RX ADMIN — PHENYLEPHRINE HYDROCHLORIDE 50 MCG: 10 INJECTION INTRAVENOUS at 09:49

## 2020-07-09 RX ADMIN — PHENYLEPHRINE HYDROCHLORIDE 50 MCG: 10 INJECTION INTRAVENOUS at 07:44

## 2020-07-09 RX ADMIN — PHENYLEPHRINE HYDROCHLORIDE 50 MCG: 10 INJECTION INTRAVENOUS at 10:01

## 2020-07-09 RX ADMIN — Medication 2 G: at 07:40

## 2020-07-09 RX ADMIN — ROCURONIUM BROMIDE 5 MG: 10 INJECTION, SOLUTION INTRAVENOUS at 09:47

## 2020-07-09 RX ADMIN — Medication 10 ML: at 22:25

## 2020-07-09 RX ADMIN — HYDROMORPHONE HYDROCHLORIDE 0.5 MG: 2 INJECTION INTRAMUSCULAR; INTRAVENOUS; SUBCUTANEOUS at 10:55

## 2020-07-09 RX ADMIN — ROCURONIUM BROMIDE 5 MG: 10 INJECTION, SOLUTION INTRAVENOUS at 08:21

## 2020-07-09 RX ADMIN — MIDAZOLAM 2 MG: 1 INJECTION INTRAMUSCULAR; INTRAVENOUS at 07:02

## 2020-07-09 RX ADMIN — KETAMINE HYDROCHLORIDE 45 MG: 50 INJECTION INTRAMUSCULAR; INTRAVENOUS at 10:04

## 2020-07-09 RX ADMIN — ACETAMINOPHEN 1000 MG: 500 TABLET, FILM COATED ORAL at 06:03

## 2020-07-09 RX ADMIN — GABAPENTIN 600 MG: 300 CAPSULE ORAL at 06:03

## 2020-07-09 RX ADMIN — DOCUSATE SODIUM 100 MG: 100 CAPSULE, LIQUID FILLED ORAL at 17:16

## 2020-07-09 RX ADMIN — LIDOCAINE HYDROCHLORIDE 80 MG: 20 INJECTION, SOLUTION EPIDURAL; INFILTRATION; INTRACAUDAL; PERINEURAL at 07:31

## 2020-07-09 RX ADMIN — FENTANYL CITRATE 25 MCG: 50 INJECTION INTRAMUSCULAR; INTRAVENOUS at 10:10

## 2020-07-09 RX ADMIN — Medication 3 MG: at 10:31

## 2020-07-09 RX ADMIN — OXYCODONE 10 MG: 5 TABLET ORAL at 22:24

## 2020-07-09 RX ADMIN — DOCUSATE SODIUM 100 MG: 100 CAPSULE, LIQUID FILLED ORAL at 14:07

## 2020-07-09 RX ADMIN — SODIUM CHLORIDE, SODIUM LACTATE, POTASSIUM CHLORIDE, AND CALCIUM CHLORIDE: 600; 310; 30; 20 INJECTION, SOLUTION INTRAVENOUS at 10:13

## 2020-07-09 RX ADMIN — OXYCODONE 10 MG: 5 TABLET ORAL at 16:33

## 2020-07-09 RX ADMIN — ROCURONIUM BROMIDE 10 MG: 10 INJECTION, SOLUTION INTRAVENOUS at 08:50

## 2020-07-09 RX ADMIN — FENTANYL CITRATE 25 MCG: 50 INJECTION INTRAMUSCULAR; INTRAVENOUS at 10:13

## 2020-07-09 RX ADMIN — SODIUM CHLORIDE, SODIUM LACTATE, POTASSIUM CHLORIDE, CALCIUM CHLORIDE, AND DEXTROSE MONOHYDRATE 125 ML/HR: 600; 310; 30; 20; 5 INJECTION, SOLUTION INTRAVENOUS at 12:00

## 2020-07-09 RX ADMIN — GLYCOPYRROLATE 0.4 MG: 0.2 INJECTION, SOLUTION INTRAMUSCULAR; INTRAVENOUS at 10:31

## 2020-07-09 RX ADMIN — PHENYLEPHRINE HYDROCHLORIDE 50 MCG: 10 INJECTION INTRAVENOUS at 07:49

## 2020-07-09 RX ADMIN — FENTANYL CITRATE 50 MCG: 50 INJECTION INTRAMUSCULAR; INTRAVENOUS at 07:31

## 2020-07-09 RX ADMIN — ACETAMINOPHEN 1000 MG: 500 TABLET, FILM COATED ORAL at 12:00

## 2020-07-09 RX ADMIN — KETOROLAC TROMETHAMINE 30 MG: 30 INJECTION, SOLUTION INTRAMUSCULAR; INTRAVENOUS at 10:14

## 2020-07-09 NOTE — PROGRESS NOTES
TRANSFER - OUT REPORT:    Verbal report given to Deborah Rn(name) on Rossi Philippe  being transferred to 3rd floor(unit) for routine progression of care       Report consisted of patients Situation, Background, Assessment and   Recommendations(SBAR). Information from the following report(s) Procedure Summary, Intake/Output and MAR was reviewed with the receiving nurse. Lines:   Peripheral IV 07/09/20 Right Hand (Active)   Site Assessment Clean, dry, & intact 07/09/20 1116   Phlebitis Assessment 0 07/09/20 1116   Infiltration Assessment 0 07/09/20 1116   Dressing Status Clean, dry, & intact 07/09/20 1116   Dressing Type Transparent 07/09/20 1116   Hub Color/Line Status Green; Infusing 07/09/20 1116        Opportunity for questions and clarification was provided.       Patient transported with:   O2 @ 2 liters

## 2020-07-09 NOTE — PROGRESS NOTES
Pt doing well. Events of surgery reviewed. Patient Vitals for the past 24 hrs:   Temp Pulse Resp BP SpO2   07/09/20 1635 97.5 °F (36.4 °C) (!) 52 16 94/59 100 %   07/09/20 1157 98.1 °F (36.7 °C) (!) 52 16 102/67 100 %   07/09/20 1130  (!) 54 15 103/64    07/09/20 1128     100 %   07/09/20 1126  (!) 55 16 96/61 100 %   07/09/20 1121  (!) 52 16 105/64 100 %   07/09/20 1116  (!) 49 16 107/63 100 %   07/09/20 1111  (!) 51 16 104/64 100 %   07/09/20 1106  (!) 49 16 98/59 100 %   07/09/20 1101  (!) 53 18 97/55 100 %   07/09/20 1056  (!) 51 16 111/58 100 %   07/09/20 1051  (!) 50 16 110/57    07/09/20 1046 99.4 °F (37.4 °C) (!) 54 18 115/53 100 %   07/09/20 0542 97.9 °F (36.6 °C) 71 16 104/68 100 %     Continue current care.

## 2020-07-09 NOTE — PROGRESS NOTES
TRANSFER - IN REPORT:    Verbal report received from ELSA Canas(name) on Iraida Burgess  being received from PACU for routine post - op      Report consisted of patients Situation, Background, Assessment and   Recommendations(SBAR). Information from the following report(s) SBAR was reviewed with the receiving nurse. Opportunity for questions and clarification was provided. Assessment completed upon patients arrival to unit and care assumed.

## 2020-07-09 NOTE — INTERVAL H&P NOTE
Update History & Physical 
 
The Patient's History and Physical of July 1, 2020, was reviewed with the patient and I examined the patient. There was no change. The surgical site was confirmed by the patient and me. Plan:  The risk, benefits, expected outcome, and alternative to the recommended procedure have been discussed with the patient. Patient understands and wants to proceed with the procedure.  
 
Electronically signed by Chris Grijalva MD on 7/9/2020 at 7:20 AM

## 2020-07-09 NOTE — ANESTHESIA PREPROCEDURE EVALUATION
Anesthetic History   No history of anesthetic complications            Review of Systems / Medical History  Patient summary reviewed and pertinent labs reviewed    Pulmonary  Within defined limits                 Neuro/Psych   Within defined limits           Cardiovascular  Within defined limits                Exercise tolerance: >4 METS     GI/Hepatic/Renal     GERD: well controlled           Endo/Other        Anemia (Has had iron transfusions)     Other Findings            Physical Exam    Airway  Mallampati: II  TM Distance: > 6 cm  Neck ROM: normal range of motion   Mouth opening: Normal     Cardiovascular  Regular rate and rhythm,  S1 and S2 normal,  no murmur, click, rub, or gallop  Rhythm: regular  Rate: normal         Dental  No notable dental hx       Pulmonary  Breath sounds clear to auscultation               Abdominal  GI exam deferred       Other Findings            Anesthetic Plan    ASA: 2  Anesthesia type: general          Induction: Intravenous  Anesthetic plan and risks discussed with: Patient      ERAS

## 2020-07-09 NOTE — OP NOTES
[place]  [birth year]  [id number]      pre op dx:  menorrhagia  post op dx: DESTINY  Procedure:  TLH and  Bilateral salpingectomy and left oophorectomy  Surgeon: Dr. Chaitanya Matos (. [address])  Assistant:   Apoorva Rhodes. An assistant was necessary in order to optimize exposure during the . [address]. Once the colpotomy is started it is difficult to maintain pneumoperitoneum and a skilled assistant facilitates final dissection/removal of the right lateral portion of the specimen in a timely and safe fashion while taking steps to maintain pneumoperitoneum. EBL: 50cc  Anesthesia:  GETA  Findings: The uterus was large weighed > 300 grams, filled the pelvis, extended up to the pelvic brim approximately 10 week size with a prominent subserosal fibroid involving the left lateral uterus. Bilateral ovaries & bilateral FTs nl appearing. Normal appearing:  liver   Appendix and GB could not be visualized  Bilateral ovarian fossa--->nl appearing. Of note the bilateral ureters were identified early in the case and appeared to be remote from the operative field. They were noted to peristalse at multiple intervals throughout the case and were inspected a final time at the completion of the case and again noted to peristalse. Specimens:  Uterus with bilateral FTs  to path  Complications:  none  misc: urine clear throughout the case    Procedure  After obtaining informed consent pt was taken to the OR where anesthesia was obtained via GETA. Positioned in the dorsal, supine position. Abdomen & vagina prepped and draped in the usual sterile fashion. The cervix was sequentially dilated using hegar dilators after sounding the uterus to 9 cm. The uterine  with vaginal occluder was then placed in the usual fashion after placing sutures at 3 & 9:00    I changed my gloves and attention was turned to the abdominal portion of the case. An infraumbilical anticipated trocar site was locally infiltrated with 0.5% marcaine. Incision was created sharply & the subcutaneous tissue further developed using hemostats. Of note, all trocar sites were locally infiltrated with 0.5% marcaine without epinephrine. A veress needle was used to access the pelvis. Peritoneal placement was confirmed using: negative aspiration, the hanging drop method and noting pressures in the 6 mm Hg range or less. Pelvis was insufflated with CO2 gas. A 5mm trocar was placed under direct visualization. The 5 mm 0 degree laparoscope was advanced into the pelvis under direct visualization. The patient was placed in trendelenburg, additional trocars were placed under direct visualization. Each trocar site was locally infiltrated with 0.5% marcaine   Left 11 mm trocar was placed in the LLQ 2 cm above and 2 cm medial to the superior iliac spine under direct visualization. A left 5 mm trocar was placed in a similar fashion ~ 20 cm above the  11 mm trocar. A 5 mm RLQ trocar was also placed in the RLQ 2 cm above and 2 cm medial to the superior iliac spine under direct visualization. The  pelvis was surveyed in a panoramic fashion with above findings noted. The ligasure was used. The left FT was elevated and freed up from the mesosalpinx along it's full extent. The left utreoovarian ligament was likewise freed up using the ligasure. Dissection was continued across the round ligament then the left anterior leaf of the broad ligament was superficially incised to the midline of  the bladder refection usingthe harmonic scalpel . The posterior leaf was also skeletonized under direct visualization. A laparoscopic kitner was used to further mobilize the Bessy reflection in a caudad direction. The ascending branch of the left uterine vessels was clamped and the ligasure was was used to clamp seal and ultimately cut the uterine vessels. The tissue was desiccated in the usual fashion.   The ascending branch of the uterine vessels was desiccated @ multiple sites taking care to stay medial in an effort to reduce the pulse pressure @ the artery before cutting the vessls. An identical procedure was then performed on the right by Nani Grier    The vesicouterine reflection was further mobilized in a caudad direction using the harmonic and the laparoscopic kitner/peanut. The right uterine vessels were clamped and desiccated in the usual fashion using the ligasure. The ascending branch of the uterine vessels was desiccated @ multiple sites taking care to stay medial in an effort to reduce the pulse pressure @ the artery. The cardinal ligaments were then clamped and desiccated in a  sequential fashion. The cone shaped bell of the uterine  with vaginal occluder was haptically palpable through the tissue of the upper vagina. An anterior colpotomy was made using the hot blade of the harmonic. The cone shaped bell of the uterine  was used as a guide in creating the anterior colpotomy. The vagina was then further opened up using the harmonic in a sequential fashion. The procedure was performed on the pt's left starting with the cardinal ligaments and proceeding across the anterior uterus. The vaginal occluder was inflated to facilitate maintenance of the pneumoperitoneum. Care was taken to stay medial to the uterus while taking down the cardinal ligaments & uterosacral ligaments. Once the specimen was freed up entirely the vaginal occluder was desufflated and uterine  was used to remove the specimen vaginally. The pelvis was irrigated and excellent hemostasis noted. The vaginal cuff was then closed using the \"V lock barbed suture\" in a continuous running fashion starting at the left angle then continuing across the intervening remainder of the vaginal cuff to the right angle. It was secured by sewing back along the suture line, further reinforcing the cuff.   The cuff could not be reperitonealized ( h/o ). The pelvis was again copiously irrigated and excellent hemostasis noted. Of note the bilateral ureters were identified early in the case and appeared to be remote from the operative field. They were noted to peristalse at multiple intervals throughout the case and were inspected a final time at the completion of the case and again noted to peristalse. Argenis powder was puffed over the pedicles. The 5 mm trocar was removed under direct visualization. The remaining trocars were likewise removed under direct visualization and the pelvis desufflated. Pt was taken out of trendelenburg and each trocar skin site reapproximated using 4-0 monocryl on a GI needle. A sponge on a stick had been placed in the vagina and was inspected at the completion of the case, very little blood was noted on the sponge. Pt tolerated the procedure without complication. All counts were correct times two. The urine was clear throughout the case. Pt was transported to the recovery room in stable condition. I spoke with the patient's  postoperatively and events of surgery were reviewed.

## 2020-07-10 VITALS
HEIGHT: 64 IN | HEART RATE: 76 BPM | TEMPERATURE: 98.2 F | BODY MASS INDEX: 25.81 KG/M2 | SYSTOLIC BLOOD PRESSURE: 84 MMHG | RESPIRATION RATE: 16 BRPM | DIASTOLIC BLOOD PRESSURE: 54 MMHG | OXYGEN SATURATION: 100 % | WEIGHT: 151.2 LBS

## 2020-07-10 LAB
CREAT SERPL-MCNC: 0.51 MG/DL (ref 0.6–1)
HGB BLD-MCNC: 9.7 G/DL (ref 11.7–15.4)

## 2020-07-10 PROCEDURE — 74011250636 HC RX REV CODE- 250/636: Performed by: OBSTETRICS & GYNECOLOGY

## 2020-07-10 PROCEDURE — 82565 ASSAY OF CREATININE: CPT

## 2020-07-10 PROCEDURE — 85018 HEMOGLOBIN: CPT

## 2020-07-10 PROCEDURE — 74011250637 HC RX REV CODE- 250/637: Performed by: OBSTETRICS & GYNECOLOGY

## 2020-07-10 PROCEDURE — 99218 HC RM OBSERVATION: CPT

## 2020-07-10 PROCEDURE — 36415 COLL VENOUS BLD VENIPUNCTURE: CPT

## 2020-07-10 RX ORDER — OXYCODONE HYDROCHLORIDE 5 MG/1
5-10 TABLET ORAL
Qty: 20 TAB | Refills: 0 | Status: SHIPPED | OUTPATIENT
Start: 2020-07-10 | End: 2020-07-15

## 2020-07-10 RX ADMIN — GABAPENTIN 300 MG: 300 CAPSULE ORAL at 07:45

## 2020-07-10 RX ADMIN — Medication 10 ML: at 06:23

## 2020-07-10 RX ADMIN — ACETAMINOPHEN 1000 MG: 500 TABLET, FILM COATED ORAL at 06:23

## 2020-07-10 RX ADMIN — DOCUSATE SODIUM 100 MG: 100 CAPSULE, LIQUID FILLED ORAL at 07:45

## 2020-07-10 RX ADMIN — KETOROLAC TROMETHAMINE 30 MG: 30 INJECTION, SOLUTION INTRAMUSCULAR at 01:03

## 2020-07-10 RX ADMIN — ACETAMINOPHEN 1000 MG: 500 TABLET, FILM COATED ORAL at 12:05

## 2020-07-10 RX ADMIN — ACETAMINOPHEN 1000 MG: 500 TABLET, FILM COATED ORAL at 01:03

## 2020-07-10 RX ADMIN — KETOROLAC TROMETHAMINE 30 MG: 30 INJECTION, SOLUTION INTRAMUSCULAR at 06:23

## 2020-07-10 NOTE — PROGRESS NOTES
Gynecology Progress Note    Patient doing well post-op day 1 from Procedure(s):  ERAS / HYSTERECTOMY TOTAL LAPAROSCOPIC BILATERAL SALPINGECTOMY  without significant complaints. Pain controlled on current medication. Voiding without difficulty. Patient is passing flatus. Tolerating po foods and liquids. Vitals:  Blood pressure (!) 84/54, pulse 76, temperature 98.2 °F (36.8 °C), resp. rate 16, height 5' 4\" (1.626 m), weight 151 lb 3.2 oz (68.6 kg), last menstrual period 2020, SpO2 100 %. Temp (24hrs), Av °F (36.7 °C), Min:97.5 °F (36.4 °C), Max:98.6 °F (37 °C)        Exam:  Patient without distress. Abdomen soft,  nontender. IncisionS dry and clean without erythema. Lower extremities are negative for swelling, cords, or tenderness. Lab/Data Review: All lab results for the last 24 hours reviewed. Assessment and Plan:  Patient dong well s/p uncomplicatedProcedure(s):  TLH w/ bilateral salpingectomy. Discharge home. D/w pt:  Infx/driving/physical activity/pelvic rest precautions    Pelvic rest x 6 wk ( no sex, swimming, tampons or douching)  Pt may drive after 2-4 weeks as long as she is NOT taking narcotics & can quickly maneuver her foot from the gas to the brake. For the next 2-4 weeks:  eat, shower and advance activity as tolerated. Notify [facility] for temp > 100.5, vaginal discharge with odor, heavy VB, worsening pelvic/abdominal pain and/or other concerns.

## 2020-07-10 NOTE — DISCHARGE INSTRUCTIONS
DISCHARGE SUMMARY from Nurse    PATIENT INSTRUCTIONS:    After general anesthesia or intravenous sedation, for 24 hours or while taking prescription Narcotics:  · Limit your activities  · Do not drive and operate hazardous machinery  · Do not make important personal or business decisions  · Do  not drink alcoholic beverages  · If you have not urinated within 8 hours after discharge, please contact your surgeon on call. Report the following to your surgeon:  · Excessive pain, swelling, redness or odor of or around the surgical area  · Temperature over 100.5  · Nausea and vomiting lasting longer than 4 hours or if unable to take medications  · Any signs of decreased circulation or nerve impairment to extremity: change in color, persistent  numbness, tingling, coldness or increase pain  · Any questions    What to do at Home:  Recommended activity: Activity as tolerated, ***    If you experience any of the following symptoms ***, please follow up with ***. *  Please give a list of your current medications to your Primary Care Provider. *  Please update this list whenever your medications are discontinued, doses are      changed, or new medications (including over-the-counter products) are added. *  Please carry medication information at all times in case of emergency situations. These are general instructions for a healthy lifestyle:    No smoking/ No tobacco products/ Avoid exposure to second hand smoke  Surgeon General's Warning:  Quitting smoking now greatly reduces serious risk to your health.     Obesity, smoking, and sedentary lifestyle greatly increases your risk for illness    A healthy diet, regular physical exercise & weight monitoring are important for maintaining a healthy lifestyle    You may be retaining fluid if you have a history of heart failure or if you experience any of the following symptoms:  Weight gain of 3 pounds or more overnight or 5 pounds in a week, increased swelling in our hands or feet or shortness of breath while lying flat in bed. Please call your doctor as soon as you notice any of these symptoms; do not wait until your next office visit. The discharge information has been reviewed with the patient. The patient verbalized understanding. Discharge medications reviewed with the patient and appropriate educational materials and side effects teaching were provided.   ___________________________________________________________________________________________________________________________________

## 2020-07-10 NOTE — DISCHARGE SUMMARY
Discharge Summary     Name: Beryle Ratel MRN: [id number]  SSN: [id number]    YOB: 1975  Age: 39 y.o. Sex: female      Allergies: Patient has no known allergies. Admit Date: 2020    Discharge Date: 7/10/2020      Admitting Physician: Juanpablo Whyte MD     * Admission Diagnoses: Menorrhagia    * Discharge Diagnoses:   Hospital Problems as of 7/10/2020 Date Reviewed: 2020          Codes Class Noted - Resolved POA    Anemia ICD-10-CM: D64.9  ICD-9-CM: 285.9  2020 - Present Unknown        Fibroids ICD-10-CM: D21.9  ICD-9-CM: 215.9  2020 - Present Unknown        * (Principal) Menorrhagia with regular cycle ICD-10-CM: N92.0  ICD-9-CM: 626.2  2018 - Present Unknown    Overview Signed 2018  8:08 AM by Alona Henderson MD     , , menorrhagia, prolonged menses and occasional IMB. Contraception: Previously reported vasectomy, unclear if she is still with that partner. Previously  had mirena for bleeding control (fibroids)-placed 09,  removed 14-pt requested (BTB)    2018 US:  ENLARGED UTERUS (10.5/8/7) WITH KNOWN FIBROIDS  FIBROIDS 2 - 5 ARE ADJACENT TO ENDOMETRIUM, APPEAR TO BE INTRAMURAL ENCROACHING  ENDOMETRIUM POSTERIORY AND ANTERIORLY  FIBROIDS 1, 6 AND 7 - INTRAMURAL  FIBROID 7 CONTAINS CALCIFICATIONS  All fibroids < 3 cm  ENDOMETRIUM APPEARS WNL- CD 4  RT OV WNL  LT OV NOT VISUALIZED - PROMINENT BOWEL PATTERN LT ADN ( TV AND ABD APPROACH)  MIN FF     HPI:  Metrorrhagia. March period lasted 3 days ( 3/6/18-3/9/18). Bled -, - and -today. H/o myomectomy. Discussed tx options, pt does not want to use contraception, understands endometrial ablation is not an option if she won't use contraception. Declines hysterectomy.       2018 WBC 3.7, Hgb 5.4, MCV 64, platelets 380  3/69/78 ED visit for blood transfusion, given 2 units  2018 EGD and colonoscopy with normal results  2018 hematology consultation, iv Fe2+    6/2018: EMB  Refer to Dr. Marika Barreto                      * Procedures: Total Laparoscopic Hysterectomy with Bilateral Salpingectomy    * Discharge Condition: [facility] Course: Normal hospital course for this procedure. Significant Diagnostic Studies:   Recent Results (from the past 24 hour(s))   HEMOGLOBIN    Collection Time: 07/10/20  4:04 AM   Result Value Ref Range    HGB 9.7 (L) 11.7 - 15.4 g/dL   CREATININE    Collection Time: 07/10/20  4:04 AM   Result Value Ref Range    Creatinine 0.51 (L) 0.6 - 1.0 MG/DL       * Disposition: Home    Discharge Medications:   Current Discharge Medication List      START taking these medications    Details   oxyCODONE IR (ROXICODONE) 5 mg immediate release tablet Take 1-2 Tabs by mouth every six (6) hours as needed for Pain for up to 5 days. Max Daily Amount: 40 mg.  Qty: 20 Tab, Refills: 0    Associated Diagnoses: Post-op pain         CONTINUE these medications which have NOT CHANGED    Details   OTHER Iron gummy daily      valACYclovir (VALTREX) 500 mg tablet Take 1 Tab by mouth daily. Qty: 90 Tab, Refills: 4         STOP taking these medications       progesterone (PROMETRIUM) 100 mg capsule Comments:   Reason for Stopping:                * Follow-up Care/Patient Instructions: Activity: No sex, douching, or tampons for 6 weeks or as directed by your physician. No heavy lifting for 6 weeks. No driving while taking pain medication. Diet: Resume pre-hospital diet  Wound Care: As directed    D/w pt:  Infx/driving/physical activity/pelvic rest precautions    Pelvic rest x 6 wk ( no sex, swimming, tampons or douching)  Pt may drive after 2-4 weeks as long as she is NOT taking narcotics & can quickly maneuver her foot from the gas to the brake. For the next 2-4 weeks:  eat, shower and advance activity as tolerated. Notify [facility] for temp > 100.5, vaginal discharge with odor, heavy VB, worsening pelvic/abdominal pain and/or other concerns.       Follow-up Information     Follow up With Specialties Details Why Contact Info    Anthony Harrell MD Obstetrics & Gynecology, Gynecology, [address]  [phone]

## 2020-07-15 NOTE — ANESTHESIA POSTPROCEDURE EVALUATION
Procedure(s):  ERAS / HYSTERECTOMY TOTAL LAPAROSCOPIC  BILATERAL SALPINGO / POSS BILATERAL -OOPHORECTOMY LAPAROSCOPIC.     general    Anesthesia Post Evaluation      Multimodal analgesia: multimodal analgesia used between 6 hours prior to anesthesia start to PACU discharge  Patient location during evaluation: PACU  Patient participation: complete - patient participated  Level of consciousness: awake  Pain management: adequate  Airway patency: patent  Anesthetic complications: no  Cardiovascular status: acceptable  Respiratory status: acceptable  Hydration status: acceptable  Post anesthesia nausea and vomiting:  none  Final Post Anesthesia Temperature Assessment:  Normothermia (36.0-37.5 degrees C)      INITIAL Post-op Vital signs:   Vitals Value Taken Time   BP 96/61 7/9/2020 11:26 AM   Temp 37.4 °C (99.4 °F) 7/9/2020 10:46 AM   Pulse 55 7/9/2020 11:26 AM   Resp 16 7/9/2020 11:26 AM   SpO2 100 % 7/9/2020 11:26 AM

## 2020-09-29 ENCOUNTER — TRANSCRIBE ORDER (OUTPATIENT)
Dept: SCHEDULING | Age: 45
End: 2020-09-29

## 2020-09-29 DIAGNOSIS — Z12.31 SCREENING MAMMOGRAM FOR HIGH-RISK PATIENT: Primary | ICD-10-CM

## 2020-09-30 ENCOUNTER — HOSPITAL ENCOUNTER (OUTPATIENT)
Dept: MAMMOGRAPHY | Age: 45
Discharge: HOME | End: 2020-09-30
Attending: OBSTETRICS & GYNECOLOGY
Payer: COMMERCIAL

## 2020-09-30 DIAGNOSIS — Z12.31 SCREENING MAMMOGRAM FOR HIGH-RISK PATIENT: ICD-10-CM

## 2020-09-30 PROCEDURE — 77063 BREAST TOMOSYNTHESIS BI: CPT

## 2021-09-10 ENCOUNTER — HOSPITAL ENCOUNTER (OUTPATIENT)
Dept: ULTRASOUND IMAGING | Age: 46
Discharge: HOME | End: 2021-09-10
Attending: OBSTETRICS & GYNECOLOGY

## 2021-09-10 DIAGNOSIS — R94.6 ABNORMAL THYROID EXAM: ICD-10-CM

## 2021-10-02 ENCOUNTER — HOSPITAL ENCOUNTER (OUTPATIENT)
Dept: MAMMOGRAPHY | Age: 46
Discharge: HOME | End: 2021-10-02
Attending: OBSTETRICS & GYNECOLOGY
Payer: COMMERCIAL

## 2021-10-02 DIAGNOSIS — Z12.31 ENCOUNTER FOR SCREENING MAMMOGRAM FOR MALIGNANT NEOPLASM OF BREAST: ICD-10-CM

## 2021-10-02 PROCEDURE — 77063 BREAST TOMOSYNTHESIS BI: CPT

## 2021-12-06 ENCOUNTER — HOSPITAL ENCOUNTER (OUTPATIENT)
Dept: GENERAL RADIOLOGY | Age: 46
Discharge: HOME | End: 2021-12-06
Attending: PHYSICIAN ASSISTANT
Payer: COMMERCIAL

## 2021-12-06 DIAGNOSIS — Z98.84 S/P LAPAROSCOPIC SLEEVE GASTRECTOMY: ICD-10-CM

## 2021-12-06 DIAGNOSIS — R13.10 DYSPHAGIA, UNSPECIFIED TYPE: ICD-10-CM

## 2021-12-06 PROCEDURE — 74240 X-RAY XM UPR GI TRC 1CNTRST: CPT

## 2021-12-06 PROCEDURE — 74011000250 HC RX REV CODE- 250: Performed by: PHYSICIAN ASSISTANT

## 2021-12-06 RX ADMIN — BARIUM SULFATE 355 ML: 0.6 SUSPENSION ORAL at 11:48

## 2021-12-14 ENCOUNTER — HOSPITAL ENCOUNTER (OUTPATIENT)
Dept: LAB | Age: 46
Discharge: HOME | End: 2021-12-14
Payer: COMMERCIAL

## 2021-12-14 DIAGNOSIS — Z98.84 S/P LAPAROSCOPIC SLEEVE GASTRECTOMY: ICD-10-CM

## 2021-12-14 DIAGNOSIS — D50.0 IRON DEFICIENCY ANEMIA DUE TO CHRONIC BLOOD LOSS: ICD-10-CM

## 2021-12-14 LAB
ALBUMIN SERPL-MCNC: 4.1 G/DL (ref 3.5–5)
ALBUMIN/GLOB SERPL: 1.2 {RATIO} (ref 1.2–3.5)
ALP SERPL-CCNC: 76 U/L (ref 50–136)
ALT SERPL-CCNC: 20 U/L (ref 12–65)
ANION GAP SERPL CALC-SCNC: 4 MMOL/L (ref 7–16)
AST SERPL-CCNC: 12 U/L (ref 15–37)
BASOPHILS # BLD: 0 K/UL (ref 0–0.2)
BASOPHILS NFR BLD: 0 % (ref 0–2)
BILIRUB SERPL-MCNC: 0.3 MG/DL (ref 0.2–1.1)
BUN SERPL-MCNC: 6 MG/DL (ref 6–23)
CALCIUM SERPL-MCNC: 8.8 MG/DL (ref 8.3–10.4)
CHLORIDE SERPL-SCNC: 109 MMOL/L (ref 98–107)
CHOLEST SERPL-MCNC: 140 MG/DL
CO2 SERPL-SCNC: 28 MMOL/L (ref 21–32)
CREAT SERPL-MCNC: 0.8 MG/DL (ref 0.6–1)
DIFFERENTIAL METHOD BLD: ABNORMAL
EOSINOPHIL # BLD: 0 K/UL (ref 0–0.8)
EOSINOPHIL NFR BLD: 1 % (ref 0.5–7.8)
ERYTHROCYTE [DISTWIDTH] IN BLOOD BY AUTOMATED COUNT: 12.5 % (ref 11.9–14.6)
EST. AVERAGE GLUCOSE BLD GHB EST-MCNC: 117 MG/DL
FOLATE SERPL-MCNC: 8.7 NG/ML (ref 3.1–17.5)
GLOBULIN SER CALC-MCNC: 3.4 G/DL (ref 2.3–3.5)
GLUCOSE SERPL-MCNC: 109 MG/DL (ref 65–100)
HBA1C MFR BLD: 5.7 % (ref 4.2–6.3)
HCT VFR BLD AUTO: 42.7 % (ref 35.8–46.3)
HDLC SERPL-MCNC: 82 MG/DL (ref 40–60)
HDLC SERPL: 1.7 {RATIO}
HGB BLD-MCNC: 13.2 G/DL (ref 11.7–15.4)
IMM GRANULOCYTES # BLD AUTO: 0 K/UL (ref 0–0.5)
IMM GRANULOCYTES NFR BLD AUTO: 0 % (ref 0–5)
IRON SATN MFR SERPL: 22 %
IRON SERPL-MCNC: 60 UG/DL (ref 35–150)
LDLC SERPL CALC-MCNC: 47 MG/DL
LYMPHOCYTES # BLD: 1.6 K/UL (ref 0.5–4.6)
LYMPHOCYTES NFR BLD: 31 % (ref 13–44)
MCH RBC QN AUTO: 27.9 PG (ref 26.1–32.9)
MCHC RBC AUTO-ENTMCNC: 30.9 G/DL (ref 31.4–35)
MCV RBC AUTO: 90.3 FL (ref 79.6–97.8)
MONOCYTES # BLD: 0.3 K/UL (ref 0.1–1.3)
MONOCYTES NFR BLD: 7 % (ref 4–12)
NEUTS SEG # BLD: 3.2 K/UL (ref 1.7–8.2)
NEUTS SEG NFR BLD: 62 % (ref 43–78)
NRBC # BLD: 0 K/UL (ref 0–0.2)
PHOSPHATE SERPL-MCNC: 3.6 MG/DL (ref 2.5–4.5)
PLATELET # BLD AUTO: 126 K/UL (ref 150–450)
PMV BLD AUTO: 11.1 FL (ref 9.4–12.3)
POTASSIUM SERPL-SCNC: 4 MMOL/L (ref 3.5–5.1)
PROT SERPL-MCNC: 7.5 G/DL (ref 6.3–8.2)
RBC # BLD AUTO: 4.73 M/UL (ref 4.05–5.2)
SODIUM SERPL-SCNC: 141 MMOL/L (ref 136–145)
TIBC SERPL-MCNC: 272 UG/DL (ref 250–450)
TRIGL SERPL-MCNC: 55 MG/DL (ref 35–150)
VIT B12 SERPL-MCNC: 293 PG/ML (ref 193–986)
VLDLC SERPL CALC-MCNC: 11 MG/DL (ref 6–23)
WBC # BLD AUTO: 5.2 K/UL (ref 4.3–11.1)

## 2021-12-14 PROCEDURE — 84100 ASSAY OF PHOSPHORUS: CPT

## 2021-12-14 PROCEDURE — 85025 COMPLETE CBC W/AUTO DIFF WBC: CPT

## 2021-12-14 PROCEDURE — 82607 VITAMIN B-12: CPT

## 2021-12-14 PROCEDURE — 82746 ASSAY OF FOLIC ACID SERUM: CPT

## 2021-12-14 PROCEDURE — 36415 COLL VENOUS BLD VENIPUNCTURE: CPT

## 2021-12-14 PROCEDURE — 83036 HEMOGLOBIN GLYCOSYLATED A1C: CPT

## 2021-12-14 PROCEDURE — 80061 LIPID PANEL: CPT

## 2021-12-14 PROCEDURE — 83540 ASSAY OF IRON: CPT

## 2021-12-14 PROCEDURE — 80053 COMPREHEN METABOLIC PANEL: CPT

## 2022-01-05 ENCOUNTER — HOSPITAL ENCOUNTER (OUTPATIENT)
Dept: LAB | Age: 47
Discharge: HOME | End: 2022-01-05

## 2022-01-05 PROCEDURE — 88305 TISSUE EXAM BY PATHOLOGIST: CPT

## 2022-01-05 PROCEDURE — 88312 SPECIAL STAINS GROUP 1: CPT

## 2022-01-23 ENCOUNTER — HOSPITAL ENCOUNTER (EMERGENCY)
Age: 47
Discharge: HOME | End: 2022-01-23
Attending: EMERGENCY MEDICINE
Payer: COMMERCIAL

## 2022-01-23 ENCOUNTER — APPOINTMENT (OUTPATIENT)
Dept: CT IMAGING | Age: 47
End: 2022-01-23
Attending: EMERGENCY MEDICINE
Payer: COMMERCIAL

## 2022-01-23 VITALS
WEIGHT: 130 LBS | SYSTOLIC BLOOD PRESSURE: 108 MMHG | OXYGEN SATURATION: 99 % | HEART RATE: 60 BPM | TEMPERATURE: 97.9 F | HEIGHT: 64 IN | DIASTOLIC BLOOD PRESSURE: 74 MMHG | BODY MASS INDEX: 22.2 KG/M2 | RESPIRATION RATE: 18 BRPM

## 2022-01-23 DIAGNOSIS — N20.1 URETERAL CALCULI: ICD-10-CM

## 2022-01-23 DIAGNOSIS — N83.8 OVARIAN MASS: ICD-10-CM

## 2022-01-23 DIAGNOSIS — N13.30 HYDRONEPHROSIS OF LEFT KIDNEY: Primary | ICD-10-CM

## 2022-01-23 LAB — LIPASE SERPL-CCNC: 66 U/L (ref 73–393)

## 2022-01-23 PROCEDURE — 99284 EMERGENCY DEPT VISIT MOD MDM: CPT

## 2022-01-23 PROCEDURE — 96375 TX/PRO/DX INJ NEW DRUG ADDON: CPT

## 2022-01-23 PROCEDURE — 74011000258 HC RX REV CODE- 258: Performed by: EMERGENCY MEDICINE

## 2022-01-23 PROCEDURE — 96374 THER/PROPH/DIAG INJ IV PUSH: CPT

## 2022-01-23 PROCEDURE — 83690 ASSAY OF LIPASE: CPT

## 2022-01-23 PROCEDURE — 74011000636 HC RX REV CODE- 636: Performed by: EMERGENCY MEDICINE

## 2022-01-23 PROCEDURE — 99283 EMERGENCY DEPT VISIT LOW MDM: CPT

## 2022-01-23 PROCEDURE — 74011250636 HC RX REV CODE- 250/636

## 2022-01-23 PROCEDURE — 74177 CT ABD & PELVIS W/CONTRAST: CPT

## 2022-01-23 RX ORDER — MORPHINE SULFATE 4 MG/ML
4 INJECTION INTRAVENOUS
Status: COMPLETED | OUTPATIENT
Start: 2022-01-23 | End: 2022-01-23

## 2022-01-23 RX ORDER — HYDROCODONE BITARTRATE AND ACETAMINOPHEN 5; 325 MG/1; MG/1
1 TABLET ORAL
Qty: 12 TABLET | Refills: 0 | Status: SHIPPED | OUTPATIENT
Start: 2022-01-23 | End: 2022-01-27

## 2022-01-23 RX ORDER — ONDANSETRON 4 MG/1
4 TABLET, ORALLY DISINTEGRATING ORAL
Qty: 12 TABLET | Refills: 0 | Status: ON HOLD | OUTPATIENT
Start: 2022-01-23 | End: 2022-02-01 | Stop reason: SDUPTHER

## 2022-01-23 RX ORDER — SODIUM CHLORIDE 0.9 % (FLUSH) 0.9 %
10 SYRINGE (ML) INJECTION
Status: COMPLETED | OUTPATIENT
Start: 2022-01-23 | End: 2022-01-23

## 2022-01-23 RX ORDER — ONDANSETRON 2 MG/ML
4 INJECTION INTRAMUSCULAR; INTRAVENOUS
Status: COMPLETED | OUTPATIENT
Start: 2022-01-23 | End: 2022-01-23

## 2022-01-23 RX ADMIN — SODIUM CHLORIDE 1000 ML: 900 INJECTION, SOLUTION INTRAVENOUS at 17:11

## 2022-01-23 RX ADMIN — MORPHINE SULFATE 4 MG: 4 INJECTION INTRAVENOUS at 18:36

## 2022-01-23 RX ADMIN — Medication 10 ML: at 17:35

## 2022-01-23 RX ADMIN — SODIUM CHLORIDE 100 ML: 9 INJECTION, SOLUTION INTRAVENOUS at 17:35

## 2022-01-23 RX ADMIN — IOPAMIDOL 100 ML: 755 INJECTION, SOLUTION INTRAVENOUS at 17:34

## 2022-01-23 RX ADMIN — ONDANSETRON 4 MG: 2 INJECTION INTRAMUSCULAR; INTRAVENOUS at 18:36

## 2022-01-23 RX ADMIN — DIATRIZOATE MEGLUMINE AND DIATRIZOATE SODIUM 15 ML: 660; 100 LIQUID ORAL; RECTAL at 16:14

## 2022-01-23 NOTE — ED PROVIDER NOTES
70-year-old female history of hysterectomy,gastric sleeve, cholecystectomy presents to the emergency department with chief complaint of lower abdominal pain and back pain x5 days. Patient endorses nausea, inability to pass gas, 3 days of constipation prior to a small bowel movement yesterday . Patient denies vomiting , fevers , dysuria. Patient was seen at  earlier today where they obtained CBC, CMP, UA, abdominal x-ray series. Patient brought her lab work with her to the emergency department it is included in the MDM    The history is provided by the patient. No  was used. Abdominal Pain  This is a new problem. The current episode started more than 2 days ago. The problem occurs constantly. The problem has not changed since onset. Associated symptoms include abdominal pain. Pertinent negatives include no chest pain, no headaches and no shortness of breath. Nothing aggravates the symptoms. Nothing relieves the symptoms. Treatments tried: Suppository. The treatment provided no relief.         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 Alicia Luong, nl (see media note), R     Hypotension     asymptomatic    Obstruction of fallopian tube     per pt has \"1 good tube\"    Weight loss     80lbs weight loss after gastric sleeve       Past Surgical History:   Procedure Laterality Date    HX  SECTION      HX GASTRIC BYPASS  2014    gastric sleeve- [facility]    HX MYOMECTOMY  age \"early 21s\"    also \"unblocked her FT\"    HX TONSILLECTOMY      HX TOTAL LAPAROSCOPIC HYSTERECTOMY  2020    TLH w/ Bilateral salpingectomy and left oophorectomy         Family History:   Problem Relation Age of Onset    Heart Disease Maternal Grandmother     Hypertension Maternal Grandmother     Heart Disease Maternal Grandfather     Hypertension Maternal Grandfather     Breast Cancer Neg Hx     Colon Cancer Neg Hx     Deep Vein Thrombosis Neg Hx     Ovarian Cancer Neg Hx     Prostate Cancer Neg Hx     Pulmonary Embolism Neg Hx        Social History     Socioeconomic History    Marital status: SINGLE     Spouse name: Not on file    Number of children: Not on file    Years of education: Not on file    Highest education level: Not on file   Occupational History    Occupation: planner   Tobacco Use    Smoking status: Never Smoker    Smokeless tobacco: Never Used   Substance and Sexual Activity    Alcohol use: No     Comment: rare- 2 drinks/month    Drug use: No    Sexual activity: Yes     Partners: Male     Birth control/protection: None   Other Topics Concern    Not on file   Social History Narrative    1. Use large speculum. 2.  sister, Mariola Davis #[id number] and mom is Luigi Marte #[id number] (both Kofoed's pts)    3. PCP  Dr. Arianna Rico ([facility]), GI Dr. Caryle Ferraris [place]-2018 normal EGD     Social Determinants of Health     Financial Resource Strain:     Difficulty of Paying Living Expenses: Not on file   Food Insecurity:     Worried About Running Out of Food in the Last Year: Not on file    Caterina of Food in the Last Year: Not on file   Transportation Needs:     Lack of Transportation (Medical): Not on file    Lack of Transportation (Non-Medical):  Not on file   Physical Activity:     Days of Exercise per Week: Not on file    Minutes of Exercise per Session: Not on file   Stress:     Feeling of Stress : Not on file   Social Connections:     Frequency of Communication with Friends and Family: Not on file    Frequency of Social Gatherings with Friends and Family: Not on file    Attends Taoist Services: Not on file    Active Member of Clubs or Organizations: Not on file    Attends Club or Organization Meetings: Not on file    Marital Status: Not on file Intimate Partner Violence:     Fear of Current or Ex-Partner: Not on file    Emotionally Abused: Not on file    Physically Abused: Not on file    Sexually Abused: Not on file   Housing Stability:     Unable to Pay for Housing in the Last Year: Not on file    Number of [place] in the Last Year: Not on file    Unstable Housing in the Last Year: Not on file         ALLERGIES: Patient has no known allergies. Review of Systems   Constitutional: Negative for chills, fatigue and fever. Respiratory: Negative for shortness of breath. Cardiovascular: Negative for chest pain. Gastrointestinal: Positive for abdominal pain, constipation and nausea. Negative for diarrhea and vomiting. Genitourinary: Negative for dysuria and pelvic pain. Musculoskeletal: Positive for back pain. Skin: Negative for color change. Neurological: Negative for light-headedness and headaches. All other systems reviewed and are negative. Vitals:    01/23/22 1517   BP: 124/70   Pulse: 68   Resp: 18   Temp: 97.9 °F (36.6 °C)   SpO2: 100%   Weight: 59 kg (130 lb)   Height: 5' 4\" (1.626 m)            Physical Exam  Vitals and nursing note reviewed. Constitutional:       General: She is not in acute distress. Appearance: Normal appearance. She is normal weight. She is not ill-appearing, toxic-appearing or diaphoretic. HENT:      Head: Normocephalic and atraumatic. Mouth/Throat:      Mouth: Mucous membranes are moist.   Eyes:      General: No scleral icterus. Extraocular Movements: Extraocular movements intact. Conjunctiva/sclera: Conjunctivae normal.   Cardiovascular:      Rate and Rhythm: Normal rate. Pulses: Normal pulses. Heart sounds: Normal heart sounds. Pulmonary:      Effort: Pulmonary effort is normal.      Breath sounds: Normal breath sounds. Abdominal:      General: Bowel sounds are normal. There is distension. Tenderness:  There is abdominal tenderness (Diffuse mild tenderness to palpation). There is left CVA tenderness. There is no right CVA tenderness, guarding or rebound. Comments: Multiple scars due to laparoscopic procedures   Musculoskeletal:      Cervical back: Normal range of motion and neck supple. No rigidity or tenderness. Right lower leg: No edema. Left lower leg: No edema. Lymphadenopathy:      Cervical: No cervical adenopathy. Skin:     General: Skin is warm and dry. Coloration: Skin is not jaundiced or pale. Findings: No bruising, erythema or rash. Neurological:      General: No focal deficit present. Mental Status: She is alert and oriented to person, place, and time. Psychiatric:         Mood and Affect: Mood normal.         Behavior: Behavior normal.         Thought Content: Thought content normal.         Judgment: Judgment normal.          MDM  Number of Diagnoses or Management Options  Hydronephrosis of left kidney: new and does not require workup  Ovarian mass: new and does not require workup  Ureteral calculi: new and does not require workup  Diagnosis management comments: 66-year-old female with history of gastric sleeve 7 years ago, hysterectomy, cholecystectomy presents to the emergency department for 5 days of lower abdominal pain and back pain. Has had some nausea but no vomiting  Vital signs reviewed. Patient is stable. No acute distress. Patient was initially seen at an  earlier today. CBC, CMP, UA, and abdominal series x-rays were obtained. Abdominal series x-ray results via chart everywhere: 1. Partial distal small bowel obstruction versus ileus. No free air. 2.  As above.   Signed by: 1/23/2022 1:46 PM: Obi Fast    Lab results from  via chart everywhere:    Urinalysis, Auto with Microscopic-POC   Result Value Ref Range   Color, UA Yellow   Clarity, UA Clear Clear, Slightly Hazy, Hazy   Specific Gravity, UA >=1.030 1.003 - 1.035   pH, UA 5.0 4.6 - 8.0   Leukocyte Esterase, UA Negative Negative   Nitrite, UA Negative Negative   Protein, UA Negative Negative mg/dL   Glucose, UA Negative Negative mg/dL   Ketones, UA >=160 (A) Negative mg/dL   Urobilinogen, UA 0.2 <2.0 mg/dL   Bilirubin, UA Small (A) Negative mg/dL   Blood, UA Small (A) Negative   Pregnancy, Urine-POC   Result Value Ref Range   Pregnancy Test, Urine Negative Negative   Specific Gravity, UA >=1.030 1.003 - 1.035     Urine Microscopic - POC   Result Value Ref Range   WBC, UA 1-3 0 , <1, 1-3, None Seen /HPF   RBC, UA 1-2 0 , 1 , 1-2, None Seen, <1 /HPF   Squamous Epithelial, UA 1-5 <1, 1-5, 0 , None Seen /HPF   Hyaline Casts, UA None Seen None Seen, <1, 1-5, Occasional, 0 /LPF   Bacteria, UA 4+ (A) None Seen /HPF   BMET with Ionized Calcium-POC     Result Value Ref Range   Sodium,  136 - 145 mmol/L   Potassium, POC 4.3 3.5 - 5.1 mmol/L   Chloride,  98 - 107 mmol/L   Anion Gap, POC 16 10 - 20 mmol/L   BUN, POC 12 7 - 20 mg/dL   Glucose, POC 73 70 - 99 mg/dL   Creatinine, POC 0.9 0.6 - 1.1 mg/dL   eGFR Non-African American 76 >59 mL/min   eGFR  88 >59 mL/min   CO2, POC 25 20 - 30 mmol/L   Calcium, Ionized 1.16 1.13 - 1.33 mmol/L     CBC with Auto Differential-POC   Result Value Ref Range   WBC 7.3 3.5 - 10.8 K/uL   RBC 4.36 3.86 - 5.35 M/uL   Hemoglobin 13.2 11.0 - 15.4 g/dL   Hematocrit 36.7 35.6 - 47.3 %   MCV 84.2 79.0 - 100.0 fL   MCH 30.3 23.7 - 32.9 pg   MCHC 36.0 30.0 - 36.5 g/dL   RDW-CV 12.0 11.6 - 16.0 %   Platelets 897 288 - 218 K/uL   MPV 8.7 6.9 - 10.6 fL   Granulocytes, % 79.4 %   Lymphocytes, % 17.4 %   Monocytes, % 3.2 %   Lymphocytes, Abs 1.30 1.18 - 3.74 K/uL   Monocytes, Abs 0.20 (L) 0.24 - 0.86 K/uL   Granulocytes Absolute Count 5.80 1.40 - 6.60 K/uL    Based on patient's history, physical exam, recent abdominal radiographs-a CT abdomen pelvis with contrast has been ordered. Patient given 4 of morphine and IV Zofran for pain control.      Has significant findings on her CT abdomen pelvis. CT results: Findings:  CT ABDOMEN:    Limited evaluation of the lung bases and base of the mediastinum demonstrates no  significant abnormalities.      The Liver is homogeneous in attenuation. The spleen demonstrates small cystic  appearing lesions which do not appear worrisome. No contour deforming or  enhancing mass lesions are seen of the pancreas or adrenal glands. The  gallbladder has an unremarkable CT appearance without radiopaque stones or  pericholecystic fluid/inflammatory changes. The right kidney is unremarkable in  appearance. There is moderate left hydronephrosis and delayed enhancement of the  left kidney with a 3 mm left retrocardial calcification seen on image 38 which  could represent a ureteral stone.       The patient appears to be status post gastric bypass surgery. The visualized  loops of small bowel and colon are normal in caliber. The appendix is not seen. There is abnormal wall thickening of colon at the splenic flexure on image 14  which appears to asymmetrically involve the more medial wall of the descending  colon. Small ascites is seen in the left paracolic gutter extending into the  pelvis. There does appear to be some peritoneal enhancement associated with  ascites in the left paracolic gutter. Abnormal soft tissue density is seen about  left gonadal vessels. The left ovary is felt to occur on axial image 58 with a  suggested enhancing cystic structure measuring 2.1 cm in size. Additional  abnormal soft tissue density is seen in the left pelvis on axial image 61 as  well as in the left upper quadrant omentum on axial image 31. A potential  peritoneal metastatic process is difficult to exclude given these findings. .  No  adenopathy is seen. The abdominal aorta is unremarkable in appearance.     CT PELVIS:  Moderate ascites is seen in the pelvis. This demonstrates nodular peritoneal  enhancement best appreciated on axial image 65.  Once again, abnormal soft tissue  density is seen in the left pelvis on axial image 61. No pelvic adenopathy is  seen. The urinary bladder is unremarkable.     IMPRESSION  1. No evidence for acute bowel obstruction.      2. Multiple findings raising concern for a potential peritoneal metastatic  process. In a patient of this age, this is felt to most likely result from an  ovarian neoplasm with the most suspicious changes involving the left ovary as  described above. This includes abnormal soft tissue density in the left  retroperitoneum about gonadal vessels. There is moderate hydronephrosis of the  left kidney. A 3 mm calcification is seen which closely approximates the  proximal left ureter and could represent the obstructing abnormality although  the left kidney could also be obstructed by retroperitoneal changes seen. In  addition of ascites, abnormal peritoneal enhancement, and abnormal  mesenteric/omental/retroperitoneal density, there is additional asymmetric wall  thickening of the left colon which could represent additional serosal  involvement of the left colon. I discussed the patient's outside lab work, and our CT abdomen pelvis findings with the patient. Answered any all questions that the patient had. Informed the patient that her first priority is to follow-up with gynecology oncology tomorrow. Patient verbalized that she understood. Secondarily I informed her that she needs to follow-up with urology as well. Discussed with patient the signs and symptoms that would warrant a prompt return to the emergency department regarding. I included these signs and symptoms on patient's discharge paperwork. Pt Verbalized that she understood. Patient was discharged home in stable condition with a short course of pain medication. She Following up with her primary care provider as well Dr. Forrest Dubin. Voice dictation software was used during the making of this note.   This software is not perfect and grammatical and other typographical errors may be present. This note has been proofread, but may still contain errors.   Kelton Ch; 1/23/2022 @10:39 PM   ===================================================================         Amount and/or Complexity of Data Reviewed  Clinical lab tests: ordered and reviewed  Tests in the radiology section of CPT®: ordered and reviewed  Review and summarize past medical records: yes  Independent visualization of images, tracings, or specimens: yes    Risk of Complications, Morbidity, and/or Mortality  Presenting problems: moderate  Diagnostic procedures: moderate  Management options: low    Patient Progress  Patient progress: stable         Procedures

## 2022-01-23 NOTE — ED TRIAGE NOTES
Patient referred by Brandon Wilson for possible bowl obstruction. Patient reports lower back pain since Wednesday. Last BM yesterday. Patient reports some nausea and vomiting.  Masked

## 2022-01-24 NOTE — DISCHARGE INSTRUCTIONS
You were evaluated today in the emergency department for abdominal pain and possible small bowel obstruction. Please contact gynecology oncologist, Dr. Deisy Lund tomorrow regarding the soft tissue mass on your left ovary visualized on your CAT scan today. Please contact urologist, Huong Norton regarding possible 3 mm renal stone in left ureter, and moderate hydronephrosis in your left kidney. You may also want to follow-up with your primary care provider for [facility]. Do not operate heavy machinery or drive vehicles while taking Norco.  This is a sedating medication and will increase your risk of falls. This medication with Tylenol for pain.     Please return to the emergency department if you have any of the signs and symptoms to be discussed, such as, but not limited to: Chest pain, shortness of breath, increasing abdominal pain, vaginal bleeding, rectal bleeding, increasing back pain, bladder pain, fever that will not reduce with Tylenol

## 2022-01-24 NOTE — ED NOTES
I have reviewed discharge instructions with the patient. The patient verbalized understanding. Patient left ED via Discharge Method: wheelchair to Home with fiance'. Opportunity for questions and clarification provided. Patient given 2 scripts. To continue your aftercare when you leave the hospital, you may receive an automated call from our care team to check in on how you are doing. This is a free service and part of our promise to provide the best care and service to meet your aftercare needs.  If you have questions, or wish to unsubscribe from this service please call [phone]. Thank you for Choosing our [facility] Emergency Department.

## 2022-01-27 ENCOUNTER — HOSPITAL ENCOUNTER (OUTPATIENT)
Dept: CT IMAGING | Age: 47
Discharge: HOME | End: 2022-01-27
Attending: NURSE PRACTITIONER
Payer: COMMERCIAL

## 2022-01-27 DIAGNOSIS — N20.1 LEFT URETERAL STONE: ICD-10-CM

## 2022-01-27 PROCEDURE — 74176 CT ABD & PELVIS W/O CONTRAST: CPT

## 2022-01-27 NOTE — PROGRESS NOTES
CT results/images discussed with Dr. Adams Valles. Pt without pain to left flank today. Kidney function is normal. Will hold on surgical intervention until after exp lap with Dr. Ian Hsieh. Called pt to discuss CT findings. All questions answered. C/o incomplete emptying. Will have pt come to office tomorrow for PVR check.      Bard Londono, NP

## 2022-01-31 ENCOUNTER — ANESTHESIA EVENT (OUTPATIENT)
Dept: SURGERY | Age: 47
End: 2022-01-31
Payer: COMMERCIAL

## 2022-02-01 ENCOUNTER — ANESTHESIA (OUTPATIENT)
Dept: SURGERY | Age: 47
End: 2022-02-01
Payer: COMMERCIAL

## 2022-02-01 ENCOUNTER — HOSPITAL ENCOUNTER (OUTPATIENT)
Age: 47
Setting detail: OUTPATIENT SURGERY
Discharge: HOME | End: 2022-02-01
Attending: OBSTETRICS & GYNECOLOGY | Admitting: OBSTETRICS & GYNECOLOGY
Payer: COMMERCIAL

## 2022-02-01 VITALS
RESPIRATION RATE: 16 BRPM | TEMPERATURE: 98 F | HEART RATE: 58 BPM | SYSTOLIC BLOOD PRESSURE: 107 MMHG | DIASTOLIC BLOOD PRESSURE: 61 MMHG | HEIGHT: 64 IN | OXYGEN SATURATION: 100 % | WEIGHT: 125.8 LBS | BODY MASS INDEX: 21.48 KG/M2

## 2022-02-01 DIAGNOSIS — C80.0 CARCINOMATOSIS (HCC): Primary | ICD-10-CM

## 2022-02-01 LAB
BASOPHILS # BLD: 0 K/UL (ref 0–0.2)
BASOPHILS NFR BLD: 0 % (ref 0–2)
CANCER AG125 SERPL-ACNC: 44 U/ML (ref 1.5–35)
DIFFERENTIAL METHOD BLD: ABNORMAL
EOSINOPHIL # BLD: 0.1 K/UL (ref 0–0.8)
EOSINOPHIL NFR BLD: 1 % (ref 0.5–7.8)
ERYTHROCYTE [DISTWIDTH] IN BLOOD BY AUTOMATED COUNT: 11.9 % (ref 11.9–14.6)
HCT VFR BLD AUTO: 39.4 % (ref 35.8–46.3)
HGB BLD-MCNC: 12.3 G/DL (ref 11.7–15.4)
IMM GRANULOCYTES # BLD AUTO: 0 K/UL (ref 0–0.5)
IMM GRANULOCYTES NFR BLD AUTO: 0 % (ref 0–5)
LYMPHOCYTES # BLD: 1.2 K/UL (ref 0.5–4.6)
LYMPHOCYTES NFR BLD: 23 % (ref 13–44)
MCH RBC QN AUTO: 27.8 PG (ref 26.1–32.9)
MCHC RBC AUTO-ENTMCNC: 31.2 G/DL (ref 31.4–35)
MCV RBC AUTO: 89.1 FL (ref 79.6–97.8)
MONOCYTES # BLD: 0.6 K/UL (ref 0.1–1.3)
MONOCYTES NFR BLD: 12 % (ref 4–12)
NEUTS SEG # BLD: 3.4 K/UL (ref 1.7–8.2)
NEUTS SEG NFR BLD: 64 % (ref 43–78)
NRBC # BLD: 0 K/UL (ref 0–0.2)
PLATELET # BLD AUTO: 212 K/UL (ref 150–450)
PMV BLD AUTO: 10.6 FL (ref 9.4–12.3)
RBC # BLD AUTO: 4.42 M/UL (ref 4.05–5.2)
WBC # BLD AUTO: 5.3 K/UL (ref 4.3–11.1)

## 2022-02-01 PROCEDURE — 77030010507 HC ADH SKN DERMBND J&J -B: Performed by: OBSTETRICS & GYNECOLOGY

## 2022-02-01 PROCEDURE — 77030037892: Performed by: OBSTETRICS & GYNECOLOGY

## 2022-02-01 PROCEDURE — 76210000006 HC OR PH I REC 0.5 TO 1 HR: Performed by: OBSTETRICS & GYNECOLOGY

## 2022-02-01 PROCEDURE — 76210000021 HC REC RM PH II 0.5 TO 1 HR: Performed by: OBSTETRICS & GYNECOLOGY

## 2022-02-01 PROCEDURE — 77030008606 HC TRCR ENDOSC KII AMR -B: Performed by: OBSTETRICS & GYNECOLOGY

## 2022-02-01 PROCEDURE — 88331 PATH CONSLTJ SURG 1 BLK 1SPC: CPT

## 2022-02-01 PROCEDURE — 86304 IMMUNOASSAY TUMOR CA 125: CPT

## 2022-02-01 PROCEDURE — 74011000250 HC RX REV CODE- 250: Performed by: STUDENT IN AN ORGANIZED HEALTH CARE EDUCATION/TRAINING PROGRAM

## 2022-02-01 PROCEDURE — 2709999900 HC NON-CHARGEABLE SUPPLY: Performed by: OBSTETRICS & GYNECOLOGY

## 2022-02-01 PROCEDURE — 88305 TISSUE EXAM BY PATHOLOGIST: CPT

## 2022-02-01 PROCEDURE — 77030019908 HC STETH ESOPH SIMS -A: Performed by: STUDENT IN AN ORGANIZED HEALTH CARE EDUCATION/TRAINING PROGRAM

## 2022-02-01 PROCEDURE — 76060000033 HC ANESTHESIA 1 TO 1.5 HR: Performed by: OBSTETRICS & GYNECOLOGY

## 2022-02-01 PROCEDURE — 74011250637 HC RX REV CODE- 250/637: Performed by: ANESTHESIOLOGY

## 2022-02-01 PROCEDURE — 76010000161 HC OR TIME 1 TO 1.5 HR INTENSV-TIER 1: Performed by: OBSTETRICS & GYNECOLOGY

## 2022-02-01 PROCEDURE — 85025 COMPLETE CBC W/AUTO DIFF WBC: CPT

## 2022-02-01 PROCEDURE — 77030040361 HC SLV COMPR DVT MDII -B: Performed by: OBSTETRICS & GYNECOLOGY

## 2022-02-01 PROCEDURE — 74011250636 HC RX REV CODE- 250/636: Performed by: OBSTETRICS & GYNECOLOGY

## 2022-02-01 PROCEDURE — 77030037088 HC TUBE ENDOTRACH ORAL NSL COVD-A: Performed by: STUDENT IN AN ORGANIZED HEALTH CARE EDUCATION/TRAINING PROGRAM

## 2022-02-01 PROCEDURE — 77030039425 HC BLD LARYNG TRULITE DISP TELE -A: Performed by: STUDENT IN AN ORGANIZED HEALTH CARE EDUCATION/TRAINING PROGRAM

## 2022-02-01 PROCEDURE — 74011250636 HC RX REV CODE- 250/636: Performed by: ANESTHESIOLOGY

## 2022-02-01 PROCEDURE — 77030022703 HC LIGASURE  BLNT LAPSCP SEAL COVD -E: Performed by: OBSTETRICS & GYNECOLOGY

## 2022-02-01 PROCEDURE — 77030008756 HC TU IRR SUC STRY -B: Performed by: OBSTETRICS & GYNECOLOGY

## 2022-02-01 PROCEDURE — 74011250636 HC RX REV CODE- 250/636: Performed by: STUDENT IN AN ORGANIZED HEALTH CARE EDUCATION/TRAINING PROGRAM

## 2022-02-01 PROCEDURE — 77030031139 HC SUT VCRL2 J&J -A: Performed by: OBSTETRICS & GYNECOLOGY

## 2022-02-01 PROCEDURE — 77030040922 HC BLNKT HYPOTHRM STRY -A: Performed by: STUDENT IN AN ORGANIZED HEALTH CARE EDUCATION/TRAINING PROGRAM

## 2022-02-01 PROCEDURE — 88112 CYTOPATH CELL ENHANCE TECH: CPT

## 2022-02-01 RX ORDER — OXYCODONE HYDROCHLORIDE 5 MG/1
5 TABLET ORAL
Status: COMPLETED | OUTPATIENT
Start: 2022-02-01 | End: 2022-02-01

## 2022-02-01 RX ORDER — APREPITANT 40 MG/1
40 CAPSULE ORAL ONCE
Status: COMPLETED | OUTPATIENT
Start: 2022-02-01 | End: 2022-02-01

## 2022-02-01 RX ORDER — PROPOFOL 10 MG/ML
INJECTION, EMULSION INTRAVENOUS AS NEEDED
Status: DISCONTINUED | OUTPATIENT
Start: 2022-02-01 | End: 2022-02-01 | Stop reason: HOSPADM

## 2022-02-01 RX ORDER — ONDANSETRON 4 MG/1
4 TABLET, ORALLY DISINTEGRATING ORAL
Qty: 20 TABLET | Refills: 5 | Status: SHIPPED | OUTPATIENT
Start: 2022-02-01 | End: 2022-03-02

## 2022-02-01 RX ORDER — SODIUM CHLORIDE, SODIUM LACTATE, POTASSIUM CHLORIDE, CALCIUM CHLORIDE 600; 310; 30; 20 MG/100ML; MG/100ML; MG/100ML; MG/100ML
75 INJECTION, SOLUTION INTRAVENOUS CONTINUOUS
Status: DISCONTINUED | OUTPATIENT
Start: 2022-02-01 | End: 2022-02-01 | Stop reason: HOSPADM

## 2022-02-01 RX ORDER — ACETAMINOPHEN 500 MG
1000 TABLET ORAL ONCE
Status: COMPLETED | OUTPATIENT
Start: 2022-02-01 | End: 2022-02-01

## 2022-02-01 RX ORDER — LIDOCAINE HYDROCHLORIDE 10 MG/ML
0.1 INJECTION INFILTRATION; PERINEURAL AS NEEDED
Status: DISCONTINUED | OUTPATIENT
Start: 2022-02-01 | End: 2022-02-01 | Stop reason: HOSPADM

## 2022-02-01 RX ORDER — PROCHLORPERAZINE MALEATE 10 MG
10 TABLET ORAL
Qty: 30 TABLET | Refills: 5 | Status: SHIPPED | OUTPATIENT
Start: 2022-02-01 | End: 2022-02-08

## 2022-02-01 RX ORDER — MIDAZOLAM HYDROCHLORIDE 1 MG/ML
2 INJECTION, SOLUTION INTRAMUSCULAR; INTRAVENOUS ONCE
Status: DISCONTINUED | OUTPATIENT
Start: 2022-02-01 | End: 2022-02-01 | Stop reason: HOSPADM

## 2022-02-01 RX ORDER — ROCURONIUM BROMIDE 10 MG/ML
INJECTION, SOLUTION INTRAVENOUS AS NEEDED
Status: DISCONTINUED | OUTPATIENT
Start: 2022-02-01 | End: 2022-02-01 | Stop reason: HOSPADM

## 2022-02-01 RX ORDER — LIDOCAINE HYDROCHLORIDE 20 MG/ML
INJECTION, SOLUTION EPIDURAL; INFILTRATION; INTRACAUDAL; PERINEURAL AS NEEDED
Status: DISCONTINUED | OUTPATIENT
Start: 2022-02-01 | End: 2022-02-01 | Stop reason: HOSPADM

## 2022-02-01 RX ORDER — FENTANYL CITRATE 50 UG/ML
100 INJECTION, SOLUTION INTRAMUSCULAR; INTRAVENOUS AS NEEDED
Status: DISCONTINUED | OUTPATIENT
Start: 2022-02-01 | End: 2022-02-01 | Stop reason: HOSPADM

## 2022-02-01 RX ORDER — CEFAZOLIN SODIUM/WATER 2 G/20 ML
2 SYRINGE (ML) INTRAVENOUS ONCE
Status: COMPLETED | OUTPATIENT
Start: 2022-02-01 | End: 2022-02-01

## 2022-02-01 RX ORDER — DIPHENHYDRAMINE HYDROCHLORIDE 50 MG/ML
12.5 INJECTION, SOLUTION INTRAMUSCULAR; INTRAVENOUS
Status: DISCONTINUED | OUTPATIENT
Start: 2022-02-01 | End: 2022-02-01 | Stop reason: HOSPADM

## 2022-02-01 RX ORDER — FENTANYL CITRATE 50 UG/ML
INJECTION, SOLUTION INTRAMUSCULAR; INTRAVENOUS AS NEEDED
Status: DISCONTINUED | OUTPATIENT
Start: 2022-02-01 | End: 2022-02-01 | Stop reason: HOSPADM

## 2022-02-01 RX ORDER — KETOROLAC TROMETHAMINE 30 MG/ML
INJECTION, SOLUTION INTRAMUSCULAR; INTRAVENOUS AS NEEDED
Status: DISCONTINUED | OUTPATIENT
Start: 2022-02-01 | End: 2022-02-01 | Stop reason: HOSPADM

## 2022-02-01 RX ORDER — HYDROMORPHONE HYDROCHLORIDE 2 MG/ML
INJECTION, SOLUTION INTRAMUSCULAR; INTRAVENOUS; SUBCUTANEOUS AS NEEDED
Status: DISCONTINUED | OUTPATIENT
Start: 2022-02-01 | End: 2022-02-01 | Stop reason: HOSPADM

## 2022-02-01 RX ORDER — HYDROMORPHONE HYDROCHLORIDE 2 MG/ML
0.5 INJECTION, SOLUTION INTRAMUSCULAR; INTRAVENOUS; SUBCUTANEOUS
Status: DISCONTINUED | OUTPATIENT
Start: 2022-02-01 | End: 2022-02-01 | Stop reason: HOSPADM

## 2022-02-01 RX ORDER — SODIUM CHLORIDE 0.9 % (FLUSH) 0.9 %
5-40 SYRINGE (ML) INJECTION EVERY 8 HOURS
Status: DISCONTINUED | OUTPATIENT
Start: 2022-02-01 | End: 2022-02-01 | Stop reason: HOSPADM

## 2022-02-01 RX ORDER — SODIUM CHLORIDE, SODIUM LACTATE, POTASSIUM CHLORIDE, CALCIUM CHLORIDE 600; 310; 30; 20 MG/100ML; MG/100ML; MG/100ML; MG/100ML
100 INJECTION, SOLUTION INTRAVENOUS CONTINUOUS
Status: DISCONTINUED | OUTPATIENT
Start: 2022-02-01 | End: 2022-02-01 | Stop reason: HOSPADM

## 2022-02-01 RX ORDER — NEOSTIGMINE METHYLSULFATE 1 MG/ML
INJECTION, SOLUTION INTRAVENOUS AS NEEDED
Status: DISCONTINUED | OUTPATIENT
Start: 2022-02-01 | End: 2022-02-01 | Stop reason: HOSPADM

## 2022-02-01 RX ORDER — GLYCOPYRROLATE 0.2 MG/ML
INJECTION INTRAMUSCULAR; INTRAVENOUS AS NEEDED
Status: DISCONTINUED | OUTPATIENT
Start: 2022-02-01 | End: 2022-02-01 | Stop reason: HOSPADM

## 2022-02-01 RX ORDER — ONDANSETRON 2 MG/ML
INJECTION INTRAMUSCULAR; INTRAVENOUS AS NEEDED
Status: DISCONTINUED | OUTPATIENT
Start: 2022-02-01 | End: 2022-02-01 | Stop reason: HOSPADM

## 2022-02-01 RX ORDER — SODIUM CHLORIDE 0.9 % (FLUSH) 0.9 %
5-40 SYRINGE (ML) INJECTION AS NEEDED
Status: DISCONTINUED | OUTPATIENT
Start: 2022-02-01 | End: 2022-02-01 | Stop reason: HOSPADM

## 2022-02-01 RX ORDER — HYDROCODONE BITARTRATE AND ACETAMINOPHEN 5; 325 MG/1; MG/1
1 TABLET ORAL
Qty: 18 TABLET | Refills: 0 | Status: SHIPPED | OUTPATIENT
Start: 2022-02-01 | End: 2022-02-08

## 2022-02-01 RX ORDER — DEXAMETHASONE SODIUM PHOSPHATE 4 MG/ML
INJECTION, SOLUTION INTRA-ARTICULAR; INTRALESIONAL; INTRAMUSCULAR; INTRAVENOUS; SOFT TISSUE AS NEEDED
Status: DISCONTINUED | OUTPATIENT
Start: 2022-02-01 | End: 2022-02-01 | Stop reason: HOSPADM

## 2022-02-01 RX ORDER — NALOXONE HYDROCHLORIDE 0.4 MG/ML
0.1 INJECTION, SOLUTION INTRAMUSCULAR; INTRAVENOUS; SUBCUTANEOUS AS NEEDED
Status: DISCONTINUED | OUTPATIENT
Start: 2022-02-01 | End: 2022-02-01 | Stop reason: HOSPADM

## 2022-02-01 RX ORDER — FLUMAZENIL 0.1 MG/ML
0.2 INJECTION INTRAVENOUS
Status: DISCONTINUED | OUTPATIENT
Start: 2022-02-01 | End: 2022-02-01 | Stop reason: HOSPADM

## 2022-02-01 RX ADMIN — OXYCODONE HYDROCHLORIDE 5 MG: 5 TABLET ORAL at 15:36

## 2022-02-01 RX ADMIN — Medication 3 MG: at 14:24

## 2022-02-01 RX ADMIN — ONDANSETRON 4 MG: 2 INJECTION INTRAMUSCULAR; INTRAVENOUS at 14:23

## 2022-02-01 RX ADMIN — SODIUM CHLORIDE, SODIUM LACTATE, POTASSIUM CHLORIDE, AND CALCIUM CHLORIDE 100 ML/HR: 600; 310; 30; 20 INJECTION, SOLUTION INTRAVENOUS at 12:13

## 2022-02-01 RX ADMIN — ACETAMINOPHEN 1000 MG: 500 TABLET ORAL at 12:12

## 2022-02-01 RX ADMIN — FENTANYL CITRATE 100 MCG: 50 INJECTION INTRAMUSCULAR; INTRAVENOUS at 13:25

## 2022-02-01 RX ADMIN — KETOROLAC TROMETHAMINE 30 MG: 30 INJECTION, SOLUTION INTRAMUSCULAR; INTRAVENOUS at 14:23

## 2022-02-01 RX ADMIN — Medication 2 G: at 13:30

## 2022-02-01 RX ADMIN — DEXAMETHASONE SODIUM PHOSPHATE 4 MG: 4 INJECTION, SOLUTION INTRAMUSCULAR; INTRAVENOUS at 13:40

## 2022-02-01 RX ADMIN — LIDOCAINE HYDROCHLORIDE 40 MG: 20 INJECTION, SOLUTION EPIDURAL; INFILTRATION; INTRACAUDAL; PERINEURAL at 13:25

## 2022-02-01 RX ADMIN — GLYCOPYRROLATE 0.3 MG: 0.2 INJECTION, SOLUTION INTRAMUSCULAR; INTRAVENOUS at 14:24

## 2022-02-01 RX ADMIN — HYDROMORPHONE HYDROCHLORIDE 0.4 MG: 2 INJECTION INTRAMUSCULAR; INTRAVENOUS; SUBCUTANEOUS at 13:45

## 2022-02-01 RX ADMIN — PROPOFOL 150 MG: 10 INJECTION, EMULSION INTRAVENOUS at 13:25

## 2022-02-01 RX ADMIN — APREPITANT 40 MG: 40 CAPSULE ORAL at 12:12

## 2022-02-01 RX ADMIN — ROCURONIUM BROMIDE 50 MG: 10 INJECTION, SOLUTION INTRAVENOUS at 13:25

## 2022-02-01 NOTE — ANESTHESIA PREPROCEDURE EVALUATION
Anesthetic History   No history of anesthetic complications            Review of Systems / Medical History  Patient summary reviewed and pertinent labs reviewed    Pulmonary  Within defined limits                 Neuro/Psych   Within defined limits           Cardiovascular  Within defined limits                Exercise tolerance: >4 METS     GI/Hepatic/Renal     GERD: well controlled           Endo/Other        Anemia (Has had iron transfusions)     Other Findings              Physical Exam    Airway  Mallampati: II  TM Distance: > 6 cm  Neck ROM: normal range of motion   Mouth opening: Normal     Cardiovascular  Regular rate and rhythm,  S1 and S2 normal,  no murmur, click, rub, or gallop  Rhythm: regular  Rate: normal         Dental  No notable dental hx       Pulmonary  Breath sounds clear to auscultation               Abdominal  GI exam deferred       Other Findings            Anesthetic Plan    ASA: 2  Anesthesia type: general          Induction: Intravenous  Anesthetic plan and risks discussed with: Patient

## 2022-02-01 NOTE — BRIEF OP NOTE
Brief Postoperative Note    Patient: Luda Webber  YOB: 1975  MRN: [id number]    Date of Procedure: 2/1/2022     Pre-Op Diagnosis: Ovarian mass, left [N83.8]    Post-Op Diagnosis: carcinomatosis, frozen section poorly diff. malignancy    Procedure(s):  LAPAROSCOPY GYN DIAGNOSTIC WITH BIOPSies    Surgeon(s):  Marjan Chung MD    Surgical Assistant: None    Anesthesia: General     Estimated Blood Loss (mL): less than 50     Complications: None    Specimens:   ID Type Source Tests Collected by Time Destination   1 : Peritoneal nodule  Frozen Section Abdomen  Marjan Chung MD 2/1/2022 1400 Pathology   2 : Peritoneal implants  Preservative Abdomen  Marjan Chung MD 2/1/2022 1408 Pathology   3 : Omentum biopsy  Preservative Abdomen  Marjan Chung MD 2/1/2022 1414 Pathology   1 : Ascites  Fresh Ascitic Fluid  Marjan Chung MD 2/1/2022 1348 Cytology   2 : Pelvic washings Fresh Pelvis  Marjan Chung MD 2/1/2022 1416 Cytology        Implants: * No implants in log *    Drains: * No LDAs found *    Findings: scattered implants, bilateral diaphragms, peritoneum  Omental disease adherent to greater curvature stomach. Gi without obstruction. left ovary enlarged but not clearly malignant v. Right  Tubes/uterus absent  Small volume ascites.       Electronically Signed by Lauren Dang MD on 2/1/2022 at 2:45 PM

## 2022-02-01 NOTE — DISCHARGE INSTRUCTIONS
After your surgery, it is normal to feel weak and tired for several days after you return home. Your belly may be swollen and may be painful. If you had laparoscopic surgery, you may have pain in your shoulder for about 24 hours. How can you care for yourself at home? Activity  · Rest when you feel tired. Getting enough sleep will help you recover. · Try to walk each day. Start by walking a little more than you did the day before. Bit by bit, increase the amount you walk. Walking boosts blood flow and helps prevent pneumonia and constipation. · For about 2 weeks, avoid lifting anything that would make you strain. This may include a child, heavy grocery bags and milk containers, a heavy briefcase or backpack, cat litter or dog food bags, or a vacuum . · Avoid strenuous activities, such as bicycle riding, jogging, weight lifting, or aerobic exercise, until your doctor says it is okay. · You may be able to take showers (unless you have a drain near your incision) 24 to 48 hours after surgery. Pat the incision dry. Do not take a bath for the first 2 weeks, or until your doctor tells you it is okay. If you have a drain near your incision, follow your doctor's instructions. · You may drive when you are no longer taking pain medicine and can quickly move your foot from the gas pedal to the brake. You must also be able to sit comfortably for a long period of time, even if you do not plan on going far. You might get caught in traffic. · You will probably be able to go back to work in 1 to 3 weeks. If you had an open surgery, it may take 3 to 4 weeks. · Your doctor will tell you when you can have sex again. Diet  · You can eat your normal diet. If your stomach is upset, try bland, low-fat foods like plain rice, broiled chicken, toast, and yogurt. · Drink plenty of fluids (unless your doctor tells you not to). · You may notice that your bowel movements are not regular right after your surgery.  This is common. Try to avoid constipation and straining with bowel movements. You may want to take a fiber supplement every day. If you have not had a bowel movement after a couple of days, ask your doctor about taking a mild laxative. Medicines  · Your doctor will tell you if and when you can restart your medicines. He or she will also give you instructions about taking any new medicines. · If you take blood thinners, such as warfarin (Coumadin), clopidogrel (Plavix), or aspirin, be sure to talk to your doctor. He or she will tell you if and when to start taking those medicines again. Make sure that you understand exactly what your doctor wants you to do. · If your appendix ruptured, you will need to take antibiotics. Take them as directed. Do not stop taking them just because you feel better. You need to take the full course of antibiotics. · Be safe with medicines. Take pain medicines exactly as directed. ¨ If the doctor gave you a prescription medicine for pain, take it as prescribed. ¨ If you are not taking a prescription pain medicine, take an over-the-counter medicine such as acetaminophen (Tylenol), ibuprofen (Advil, Motrin), or naproxen (Aleve). Read and follow all instructions on the label. ¨ Do not take two or more pain medicines at the same time unless the doctor told you to. Many pain medicines have acetaminophen, which is Tylenol. Too much Tylenol can be harmful. · If you think your pain medicine is making you sick to your stomach:  ¨ Take your medicine after meals (unless your doctor has told you not to). ¨ Ask your doctor for a different pain medicine. Medication Interaction:  During your procedure you potentially received a medication or medications which may reduce the effectiveness of oral contraceptives. Please consider other forms of contraception for 1 month following your procedure if you are currently using oral contraceptives as your primary form of birth control.  In addition to this, we recommend continuing your oral contraceptive as prescribed, unless otherwise instructed by your physician, during this time. Incision care  · If you have strips of tape on the incision, leave the tape on for a week or until it falls off. · You may wash the area with warm, soapy water 24 to 48 hours after your surgery, unless your doctor tells you not to. Pat the area dry. · Keep the area clean and dry. You may cover it with a gauze bandage if it weeps or rubs against clothing. Change the bandage every day. Follow-up care is a key part of your treatment and safety. Be sure to make and go to all appointments, and call your doctor if you are having problems. It's also a good idea to know your test results and keep a list of the medicines you take. When should you call for help? Call 911 anytime you think you may need emergency care. For example, call if:  · You passed out (lost consciousness). · You have sudden chest pain and shortness of breath, or you cough up blood. · You have severe trouble breathing. Call your doctor now or seek immediate medical care if:  · You are sick to your stomach and cannot drink fluids. · You have severe diarrhea. · You have pain that does not get better when you take your pain medicine. · You have signs of infection, such as:  ¨ Increased pain, swelling, warmth, or redness. ¨ Red streaks leading from the wound. ¨ Pus draining from the wound. ¨ A fever. · You have loose stitches, or your incision comes open. · Bright red blood has soaked through a large bandage. · You have signs of a blood clot, such as:  ¨ Pain in your calf, back of knee, thigh, or groin. ¨ Redness and swelling in your leg or groin. Watch closely for any changes in your health, and be sure to contact your doctor if:  · You had a laparoscopic surgery and your shoulder pain lasts more than 24 hours. · The amount of drainage suddenly increases, or the color and texture changes.   You do not have a bowel movement after taking a laxative. After general anesthesia or intravenous sedation, for 24 hours or while taking prescription Narcotics:  · Limit your activities  · A responsible adult needs to be with you for the next 24 hours  · Do not drive and operate hazardous machinery  · Do not make important personal or business decisions  · Do not drink alcoholic beverages  · If you have not urinated within 8 hours after discharge, and you are experiencing discomfort from urinary retention, please go to the nearest ED. · If you have sleep apnea and have a CPAP machine, please use it for all naps and sleeping. · Please use caution when taking narcotics and any of your home medications that may cause drowsiness. *  Please give a list of your current medications to your Primary Care Provider. *  Please update this list whenever your medications are discontinued, doses are      changed, or new medications (including over-the-counter products) are added. *  Please carry medication information at all times in case of emergency situations. These are general instructions for a healthy lifestyle:  No smoking/ No tobacco products/ Avoid exposure to second hand smoke  Surgeon General's Warning:  Quitting smoking now greatly reduces serious risk to your health. Obesity, smoking, and sedentary lifestyle greatly increases your risk for illness  A healthy diet, regular physical exercise & weight monitoring are important for maintaining a healthy lifestyle    You may be retaining fluid if you have a history of heart failure or if you experience any of the following symptoms:  Weight gain of 3 pounds or more overnight or 5 pounds in a week, increased swelling in our hands or feet or shortness of breath while lying flat in bed. Please call your doctor as soon as you notice any of these symptoms; do not wait until your next office visit.

## 2022-02-01 NOTE — ANESTHESIA POSTPROCEDURE EVALUATION
Procedure(s):  LAPAROSCOPY GYN DIAGNOSTIC WITH BIOPSY. general    Anesthesia Post Evaluation      Multimodal analgesia: multimodal analgesia used between 6 hours prior to anesthesia start to PACU discharge  Patient location during evaluation: PACU  Patient participation: complete - patient participated  Level of consciousness: awake and awake and alert  Pain management: adequate  Airway patency: patent  Anesthetic complications: no  Cardiovascular status: acceptable  Respiratory status: acceptable  Hydration status: acceptable  Post anesthesia nausea and vomiting:  controlled      INITIAL Post-op Vital signs:   Vitals Value Taken Time   /66 02/01/22 1510   Temp 36.5 °C (97.7 °F) 02/01/22 1442   Pulse 50 02/01/22 1511   Resp 16 02/01/22 1510   SpO2 100 % 02/01/22 1511   Vitals shown include unvalidated device data.

## 2022-02-02 NOTE — OP NOTES
[facility]  OPERATIVE REPORT    Name:  Vernon Cruz  MR#:  [id number]  :  1975  ACCOUNT #:  [de-identified]  DATE OF SERVICE:  2022    PREOPERATIVE DIAGNOSIS:  Possible abdominal malignancy. POSTOPERATIVE DIAGNOSES:  Carcinomatosis and small volume ascites. PROCEDURE PERFORMED:  Diagnostic laparoscopy with biopsy, CPT 13267. SURGEON:  Karrie Savage MD        ANESTHESIA:  General.    COMPLICATIONS:  None. SPECIMENS REMOVED:  Pathology above and ascites along with washings. .    ESTIMATED BLOOD LOSS:  Less than 50 mL. PACKS:  None. DRAINS:  Transurethral Simpson catheter. DISPOSITION:  PACU. INDICATIONS:  This is a patient who had imaging due to pain and subsequently had some suspicious findings concerning for abdominal malignancy. The risks, benefits, alternatives, morbidities and mortalities were reviewed and she wished to proceed with the above. FINDINGS:  She had scattered carcinomatosis involving the right diaphragm, left diaphragm and peritoneal cavity. Her left ovary was slightly enlarged versus right, but not clearly malignant. Both tubes and the uterus were surgically absent. There was no evidence of bowel obstruction. She had what appeared to be omental disease at the greater curvature of her stomach. PROCEDURE:  The patient was taken to the operating room and placed under general anesthesia, sterilely prepped and draped. A Simpson placed. Open laparoscopic technique used. Infraumbilical entry obtained without difficulty to the abdominal cavity. A balloon trocar placed. Further trocars, 5 mm, were placed, one right, one left, under direct visualization. The ascites was removed for cytology and washings were taken. Various biopsies of the peritoneal surfaces were taken, one of which was sent for frozen section. A portion of omentum which was adherent near one of the previous trocar sites was removed and sent for further biopsy.   Once this was completed, hemostasis was assured. The trocars were removed. Insufflation was allowed to resolve through the midline incision. The subcuticular stitches were placed at the 5 mm sites at the midline. The fascia was closed with PDS followed by subcuticular. Dermabond was then placed on all incisions. 0.25% Marcaine with no epi was infused at the sites 30 mL total.  The Simpson was removed. Sponge, lap and needle counts were correct. The patient tolerated procedure well and was taken to PACU stable per Anesthesia.       Taylor Camacho MD      [id number]  D:  02/01/2022 14:50  T:  02/02/2022 0:21  JOB #:  [id number]

## 2022-02-04 PROBLEM — C56.9 MALIGNANT NEOPLASM OF OVARY (HCC): Status: ACTIVE | Noted: 2022-02-04

## 2022-02-08 ENCOUNTER — HOSPITAL ENCOUNTER (OUTPATIENT)
Dept: PET IMAGING | Age: 47
Discharge: HOME | End: 2022-02-08
Payer: COMMERCIAL

## 2022-02-08 DIAGNOSIS — C56.9 MALIGNANT NEOPLASM OF OVARY, UNSPECIFIED LATERALITY (HCC): ICD-10-CM

## 2022-02-08 LAB
GLUCOSE BLD STRIP.AUTO-MCNC: 74 MG/DL (ref 65–100)
SERVICE CMNT-IMP: NORMAL

## 2022-02-08 PROCEDURE — 74011000636 HC RX REV CODE- 636: Performed by: OBSTETRICS & GYNECOLOGY

## 2022-02-08 PROCEDURE — 82962 GLUCOSE BLOOD TEST: CPT

## 2022-02-08 PROCEDURE — A9552 F18 FDG: HCPCS

## 2022-02-08 RX ORDER — FLUDEOXYGLUCOSE F-18 200 MCI/ML
10 INJECTION INTRAVENOUS ONCE
Status: COMPLETED | OUTPATIENT
Start: 2022-02-08 | End: 2022-02-08

## 2022-02-08 RX ORDER — SODIUM CHLORIDE 0.9 % (FLUSH) 0.9 %
10 SYRINGE (ML) INJECTION
Status: COMPLETED | OUTPATIENT
Start: 2022-02-08 | End: 2022-02-08

## 2022-02-08 RX ADMIN — FLUDEOXYGLUCOSE F-18 15.05 MILLICURIE: 200 INJECTION INTRAVENOUS at 13:05

## 2022-02-08 RX ADMIN — DIATRIZOATE MEGLUMINE AND DIATRIZOATE SODIUM 10 ML: 660; 100 LIQUID ORAL; RECTAL at 13:05

## 2022-02-08 RX ADMIN — Medication 10 ML: at 13:05

## 2022-02-09 ENCOUNTER — HOSPITAL ENCOUNTER (OUTPATIENT)
Dept: LAB | Age: 47
Discharge: HOME | End: 2022-02-09
Payer: COMMERCIAL

## 2022-02-09 DIAGNOSIS — C80.0 CARCINOMATOSIS (HCC): ICD-10-CM

## 2022-02-09 LAB
ALBUMIN SERPL-MCNC: 3.4 G/DL (ref 3.5–5)
ALBUMIN/GLOB SERPL: 0.9 {RATIO} (ref 1.2–3.5)
ALP SERPL-CCNC: 78 U/L (ref 50–136)
ALT SERPL-CCNC: 15 U/L (ref 12–65)
ANION GAP SERPL CALC-SCNC: 5 MMOL/L (ref 7–16)
AST SERPL-CCNC: 12 U/L (ref 15–37)
BASOPHILS # BLD: 0 K/UL (ref 0–0.2)
BASOPHILS NFR BLD: 0 % (ref 0–2)
BILIRUB SERPL-MCNC: 0.5 MG/DL (ref 0.2–1.1)
BUN SERPL-MCNC: 18 MG/DL (ref 6–23)
CALCIUM SERPL-MCNC: 8.6 MG/DL (ref 8.3–10.4)
CHLORIDE SERPL-SCNC: 103 MMOL/L (ref 98–107)
CO2 SERPL-SCNC: 29 MMOL/L (ref 21–32)
CREAT SERPL-MCNC: 1.7 MG/DL (ref 0.6–1)
DIFFERENTIAL METHOD BLD: ABNORMAL
EOSINOPHIL # BLD: 0.1 K/UL (ref 0–0.8)
EOSINOPHIL NFR BLD: 1 % (ref 0.5–7.8)
ERYTHROCYTE [DISTWIDTH] IN BLOOD BY AUTOMATED COUNT: 12.1 % (ref 11.9–14.6)
GLOBULIN SER CALC-MCNC: 3.9 G/DL (ref 2.3–3.5)
GLUCOSE SERPL-MCNC: 88 MG/DL (ref 65–100)
HCT VFR BLD AUTO: 35.1 % (ref 35.8–46.3)
HGB BLD-MCNC: 10.8 G/DL (ref 11.7–15.4)
IMM GRANULOCYTES # BLD AUTO: 0 K/UL (ref 0–0.5)
IMM GRANULOCYTES NFR BLD AUTO: 0 % (ref 0–5)
LYMPHOCYTES # BLD: 0.9 K/UL (ref 0.5–4.6)
LYMPHOCYTES NFR BLD: 11 % (ref 13–44)
MCH RBC QN AUTO: 27.2 PG (ref 26.1–32.9)
MCHC RBC AUTO-ENTMCNC: 30.8 G/DL (ref 31.4–35)
MCV RBC AUTO: 88.4 FL (ref 79.6–97.8)
MONOCYTES # BLD: 0.6 K/UL (ref 0.1–1.3)
MONOCYTES NFR BLD: 8 % (ref 4–12)
NEUTS SEG # BLD: 6.6 K/UL (ref 1.7–8.2)
NEUTS SEG NFR BLD: 80 % (ref 43–78)
NRBC # BLD: 0 K/UL (ref 0–0.2)
PLATELET # BLD AUTO: 206 K/UL (ref 150–450)
PMV BLD AUTO: 9.6 FL (ref 9.4–12.3)
POTASSIUM SERPL-SCNC: 3.5 MMOL/L (ref 3.5–5.1)
PROT SERPL-MCNC: 7.3 G/DL (ref 6.3–8.2)
RBC # BLD AUTO: 3.97 M/UL (ref 4.05–5.2)
SODIUM SERPL-SCNC: 137 MMOL/L (ref 136–145)
WBC # BLD AUTO: 8.3 K/UL (ref 4.3–11.1)

## 2022-02-09 PROCEDURE — 83735 ASSAY OF MAGNESIUM: CPT

## 2022-02-09 PROCEDURE — 85025 COMPLETE CBC W/AUTO DIFF WBC: CPT

## 2022-02-09 PROCEDURE — 80053 COMPREHEN METABOLIC PANEL: CPT

## 2022-02-09 PROCEDURE — 36415 COLL VENOUS BLD VENIPUNCTURE: CPT

## 2022-02-10 LAB — MAGNESIUM SERPL-MCNC: 2.2 MG/DL (ref 1.6–2.3)

## 2022-02-11 ENCOUNTER — HOSPITAL ENCOUNTER (OUTPATIENT)
Dept: INFUSION THERAPY | Age: 47
End: 2022-02-11

## 2022-02-15 ENCOUNTER — HOSPITAL ENCOUNTER (OUTPATIENT)
Age: 47
Setting detail: OBSERVATION
Discharge: HOME | End: 2022-02-16
Attending: OBSTETRICS & GYNECOLOGY | Admitting: OBSTETRICS & GYNECOLOGY
Payer: COMMERCIAL

## 2022-02-15 ENCOUNTER — APPOINTMENT (OUTPATIENT)
Dept: GENERAL RADIOLOGY | Age: 47
End: 2022-02-15
Attending: OBSTETRICS & GYNECOLOGY
Payer: COMMERCIAL

## 2022-02-15 ENCOUNTER — ANESTHESIA (OUTPATIENT)
Dept: SURGERY | Age: 47
End: 2022-02-15
Payer: COMMERCIAL

## 2022-02-15 ENCOUNTER — ANESTHESIA EVENT (OUTPATIENT)
Dept: SURGERY | Age: 47
End: 2022-02-15
Payer: COMMERCIAL

## 2022-02-15 DIAGNOSIS — C18.5 MALIGNANT NEOPLASM OF SPLENIC FLEXURE (HCC): ICD-10-CM

## 2022-02-15 DIAGNOSIS — C18.7 CANCER OF SIGMOID COLON (HCC): Primary | ICD-10-CM

## 2022-02-15 PROBLEM — C18.9 COLON CANCER (HCC): Status: ACTIVE | Noted: 2022-02-15

## 2022-02-15 LAB
ANION GAP SERPL CALC-SCNC: 9 MMOL/L (ref 7–16)
BUN SERPL-MCNC: 10 MG/DL (ref 6–23)
CALCIUM SERPL-MCNC: 8.6 MG/DL (ref 8.3–10.4)
CHLORIDE SERPL-SCNC: 109 MMOL/L (ref 98–107)
CO2 SERPL-SCNC: 24 MMOL/L (ref 21–32)
CREAT SERPL-MCNC: 1.3 MG/DL (ref 0.6–1)
GLUCOSE SERPL-MCNC: 113 MG/DL (ref 65–100)
HCT VFR BLD AUTO: 33.5 % (ref 35.8–46.3)
HGB BLD-MCNC: 10.3 G/DL (ref 11.7–15.4)
POTASSIUM SERPL-SCNC: 2.9 MMOL/L (ref 3.5–5.1)
SODIUM SERPL-SCNC: 142 MMOL/L (ref 136–145)

## 2022-02-15 PROCEDURE — 80048 BASIC METABOLIC PNL TOTAL CA: CPT

## 2022-02-15 PROCEDURE — 77030040831 HC BAG URINE DRNG MDII -A: Performed by: OBSTETRICS & GYNECOLOGY

## 2022-02-15 PROCEDURE — 77030031139 HC SUT VCRL2 J&J -A: Performed by: OBSTETRICS & GYNECOLOGY

## 2022-02-15 PROCEDURE — 74011250636 HC RX REV CODE- 250/636: Performed by: ANESTHESIOLOGY

## 2022-02-15 PROCEDURE — 77030003029 HC SUT VCRL J&J -B: Performed by: OBSTETRICS & GYNECOLOGY

## 2022-02-15 PROCEDURE — 77030002966 HC SUT PDS J&J -A: Performed by: OBSTETRICS & GYNECOLOGY

## 2022-02-15 PROCEDURE — C1769 GUIDE WIRE: HCPCS | Performed by: OBSTETRICS & GYNECOLOGY

## 2022-02-15 PROCEDURE — C1758 CATHETER, URETERAL: HCPCS | Performed by: OBSTETRICS & GYNECOLOGY

## 2022-02-15 PROCEDURE — 77030016154: Performed by: OBSTETRICS & GYNECOLOGY

## 2022-02-15 PROCEDURE — 2709999900 HC NON-CHARGEABLE SUPPLY: Performed by: OBSTETRICS & GYNECOLOGY

## 2022-02-15 PROCEDURE — 74011000250 HC RX REV CODE- 250: Performed by: REGISTERED NURSE

## 2022-02-15 PROCEDURE — 77030003028 HC SUT VCRL J&J -A: Performed by: OBSTETRICS & GYNECOLOGY

## 2022-02-15 PROCEDURE — 77030011264 HC ELECTRD BLD EXT COVD -A: Performed by: OBSTETRICS & GYNECOLOGY

## 2022-02-15 PROCEDURE — 74011000250 HC RX REV CODE- 250: Performed by: OBSTETRICS & GYNECOLOGY

## 2022-02-15 PROCEDURE — 77030040922 HC BLNKT HYPOTHRM STRY -A: Performed by: ANESTHESIOLOGY

## 2022-02-15 PROCEDURE — 77030003602 HC NDL NRV BLK BBMI -B: Performed by: ANESTHESIOLOGY

## 2022-02-15 PROCEDURE — 77030011278 HC ELECTRD LIG IMPT COVD -F: Performed by: OBSTETRICS & GYNECOLOGY

## 2022-02-15 PROCEDURE — 76060000035 HC ANESTHESIA 2 TO 2.5 HR: Performed by: OBSTETRICS & GYNECOLOGY

## 2022-02-15 PROCEDURE — 77030040361 HC SLV COMPR DVT MDII -B: Performed by: OBSTETRICS & GYNECOLOGY

## 2022-02-15 PROCEDURE — 74011250636 HC RX REV CODE- 250/636: Performed by: NURSE ANESTHETIST, CERTIFIED REGISTERED

## 2022-02-15 PROCEDURE — C2617 STENT, NON-COR, TEM W/O DEL: HCPCS | Performed by: OBSTETRICS & GYNECOLOGY

## 2022-02-15 PROCEDURE — 77030037088 HC TUBE ENDOTRACH ORAL NSL COVD-A: Performed by: ANESTHESIOLOGY

## 2022-02-15 PROCEDURE — 77030002933 HC SUT MCRYL J&J -A: Performed by: OBSTETRICS & GYNECOLOGY

## 2022-02-15 PROCEDURE — G0378 HOSPITAL OBSERVATION PER HR: HCPCS

## 2022-02-15 PROCEDURE — 88305 TISSUE EXAM BY PATHOLOGIST: CPT

## 2022-02-15 PROCEDURE — 77030010507 HC ADH SKN DERMBND J&J -B: Performed by: OBSTETRICS & GYNECOLOGY

## 2022-02-15 PROCEDURE — 74011000250 HC RX REV CODE- 250: Performed by: ANESTHESIOLOGY

## 2022-02-15 PROCEDURE — 77030039425 HC BLD LARYNG TRULITE DISP TELE -A: Performed by: ANESTHESIOLOGY

## 2022-02-15 PROCEDURE — 74011250636 HC RX REV CODE- 250/636: Performed by: OBSTETRICS & GYNECOLOGY

## 2022-02-15 PROCEDURE — 88307 TISSUE EXAM BY PATHOLOGIST: CPT

## 2022-02-15 PROCEDURE — 74011250636 HC RX REV CODE- 250/636: Performed by: REGISTERED NURSE

## 2022-02-15 PROCEDURE — 76210000006 HC OR PH I REC 0.5 TO 1 HR: Performed by: OBSTETRICS & GYNECOLOGY

## 2022-02-15 PROCEDURE — 77030019908 HC STETH ESOPH SIMS -A: Performed by: ANESTHESIOLOGY

## 2022-02-15 PROCEDURE — 85018 HEMOGLOBIN: CPT

## 2022-02-15 PROCEDURE — 36415 COLL VENOUS BLD VENIPUNCTURE: CPT

## 2022-02-15 PROCEDURE — 74011000250 HC RX REV CODE- 250: Performed by: NURSE ANESTHETIST, CERTIFIED REGISTERED

## 2022-02-15 PROCEDURE — 2709999900 HC NON-CHARGEABLE SUPPLY

## 2022-02-15 PROCEDURE — 77030034696 HC CATH URETH FOL 2W BARD -A: Performed by: OBSTETRICS & GYNECOLOGY

## 2022-02-15 PROCEDURE — 74011250637 HC RX REV CODE- 250/637: Performed by: OBSTETRICS & GYNECOLOGY

## 2022-02-15 PROCEDURE — 77030027138 HC INCENT SPIROMETER -A

## 2022-02-15 PROCEDURE — 76010000171 HC OR TIME 2 TO 2.5 HR INTENSV-TIER 1: Performed by: OBSTETRICS & GYNECOLOGY

## 2022-02-15 DEVICE — URETERAL STENT
Type: IMPLANTABLE DEVICE | Site: URETER | Status: FUNCTIONAL
Brand: PERCUFLEX™ PLUS

## 2022-02-15 RX ORDER — BUPIVACAINE HYDROCHLORIDE AND EPINEPHRINE 2.5; 5 MG/ML; UG/ML
INJECTION, SOLUTION EPIDURAL; INFILTRATION; INTRACAUDAL; PERINEURAL
Status: COMPLETED | OUTPATIENT
Start: 2022-02-15 | End: 2022-02-15

## 2022-02-15 RX ORDER — SODIUM CHLORIDE 0.9 % (FLUSH) 0.9 %
5-40 SYRINGE (ML) INJECTION EVERY 8 HOURS
Status: DISCONTINUED | OUTPATIENT
Start: 2022-02-15 | End: 2022-02-16 | Stop reason: HOSPADM

## 2022-02-15 RX ORDER — HYDROCODONE BITARTRATE AND ACETAMINOPHEN 10; 325 MG/1; MG/1
1 TABLET ORAL
Status: DISCONTINUED | OUTPATIENT
Start: 2022-02-15 | End: 2022-02-16 | Stop reason: HOSPADM

## 2022-02-15 RX ORDER — GLYCOPYRROLATE 0.2 MG/ML
INJECTION INTRAMUSCULAR; INTRAVENOUS AS NEEDED
Status: DISCONTINUED | OUTPATIENT
Start: 2022-02-15 | End: 2022-02-15 | Stop reason: HOSPADM

## 2022-02-15 RX ORDER — ONDANSETRON 4 MG/1
4 TABLET, ORALLY DISINTEGRATING ORAL
Status: DISCONTINUED | OUTPATIENT
Start: 2022-02-15 | End: 2022-02-16 | Stop reason: HOSPADM

## 2022-02-15 RX ORDER — NALOXONE HYDROCHLORIDE 0.4 MG/ML
0.4 INJECTION, SOLUTION INTRAMUSCULAR; INTRAVENOUS; SUBCUTANEOUS AS NEEDED
Status: DISCONTINUED | OUTPATIENT
Start: 2022-02-15 | End: 2022-02-16 | Stop reason: HOSPADM

## 2022-02-15 RX ORDER — OXYCODONE HYDROCHLORIDE 5 MG/1
5 TABLET ORAL
Status: DISCONTINUED | OUTPATIENT
Start: 2022-02-15 | End: 2022-02-15 | Stop reason: HOSPADM

## 2022-02-15 RX ORDER — HYDROMORPHONE HYDROCHLORIDE 2 MG/ML
0.5 INJECTION, SOLUTION INTRAMUSCULAR; INTRAVENOUS; SUBCUTANEOUS
Status: DISCONTINUED | OUTPATIENT
Start: 2022-02-15 | End: 2022-02-15 | Stop reason: HOSPADM

## 2022-02-15 RX ORDER — LIDOCAINE HYDROCHLORIDE 10 MG/ML
0.1 INJECTION INFILTRATION; PERINEURAL AS NEEDED
Status: DISCONTINUED | OUTPATIENT
Start: 2022-02-15 | End: 2022-02-15 | Stop reason: HOSPADM

## 2022-02-15 RX ORDER — HALOPERIDOL 5 MG/ML
1 INJECTION INTRAMUSCULAR
Status: DISCONTINUED | OUTPATIENT
Start: 2022-02-15 | End: 2022-02-15 | Stop reason: HOSPADM

## 2022-02-15 RX ORDER — FENTANYL CITRATE 50 UG/ML
INJECTION, SOLUTION INTRAMUSCULAR; INTRAVENOUS AS NEEDED
Status: DISCONTINUED | OUTPATIENT
Start: 2022-02-15 | End: 2022-02-15 | Stop reason: HOSPADM

## 2022-02-15 RX ORDER — ONDANSETRON 2 MG/ML
INJECTION INTRAMUSCULAR; INTRAVENOUS AS NEEDED
Status: DISCONTINUED | OUTPATIENT
Start: 2022-02-15 | End: 2022-02-15 | Stop reason: HOSPADM

## 2022-02-15 RX ORDER — PANTOPRAZOLE SODIUM 40 MG/1
40 TABLET, DELAYED RELEASE ORAL 2 TIMES DAILY
Status: DISCONTINUED | OUTPATIENT
Start: 2022-02-15 | End: 2022-02-16 | Stop reason: HOSPADM

## 2022-02-15 RX ORDER — LIDOCAINE HYDROCHLORIDE 20 MG/ML
INJECTION, SOLUTION EPIDURAL; INFILTRATION; INTRACAUDAL; PERINEURAL AS NEEDED
Status: DISCONTINUED | OUTPATIENT
Start: 2022-02-15 | End: 2022-02-15 | Stop reason: HOSPADM

## 2022-02-15 RX ORDER — DEXAMETHASONE SODIUM PHOSPHATE 4 MG/ML
INJECTION, SOLUTION INTRA-ARTICULAR; INTRALESIONAL; INTRAMUSCULAR; INTRAVENOUS; SOFT TISSUE AS NEEDED
Status: DISCONTINUED | OUTPATIENT
Start: 2022-02-15 | End: 2022-02-15 | Stop reason: HOSPADM

## 2022-02-15 RX ORDER — PHENAZOPYRIDINE HYDROCHLORIDE 100 MG/1
100 TABLET, FILM COATED ORAL
Qty: 9 TABLET | Refills: 0 | Status: SHIPPED | OUTPATIENT
Start: 2022-02-15 | End: 2022-02-18

## 2022-02-15 RX ORDER — KETAMINE HYDROCHLORIDE 50 MG/ML
INJECTION, SOLUTION INTRAMUSCULAR; INTRAVENOUS AS NEEDED
Status: DISCONTINUED | OUTPATIENT
Start: 2022-02-15 | End: 2022-02-15 | Stop reason: HOSPADM

## 2022-02-15 RX ORDER — MIDAZOLAM HYDROCHLORIDE 1 MG/ML
2 INJECTION, SOLUTION INTRAMUSCULAR; INTRAVENOUS
Status: COMPLETED | OUTPATIENT
Start: 2022-02-15 | End: 2022-02-15

## 2022-02-15 RX ORDER — SODIUM CHLORIDE, SODIUM LACTATE, POTASSIUM CHLORIDE, CALCIUM CHLORIDE 600; 310; 30; 20 MG/100ML; MG/100ML; MG/100ML; MG/100ML
100 INJECTION, SOLUTION INTRAVENOUS CONTINUOUS
Status: DISCONTINUED | OUTPATIENT
Start: 2022-02-15 | End: 2022-02-15 | Stop reason: HOSPADM

## 2022-02-15 RX ORDER — ACETAMINOPHEN 500 MG
1000 TABLET ORAL
Status: DISCONTINUED | OUTPATIENT
Start: 2022-02-15 | End: 2022-02-16 | Stop reason: HOSPADM

## 2022-02-15 RX ORDER — FLUMAZENIL 0.1 MG/ML
0.2 INJECTION INTRAVENOUS
Status: DISCONTINUED | OUTPATIENT
Start: 2022-02-15 | End: 2022-02-15 | Stop reason: HOSPADM

## 2022-02-15 RX ORDER — PROPOFOL 10 MG/ML
INJECTION, EMULSION INTRAVENOUS AS NEEDED
Status: DISCONTINUED | OUTPATIENT
Start: 2022-02-15 | End: 2022-02-15 | Stop reason: HOSPADM

## 2022-02-15 RX ORDER — AMOXICILLIN 250 MG
2 CAPSULE ORAL DAILY
Status: DISCONTINUED | OUTPATIENT
Start: 2022-02-16 | End: 2022-02-16 | Stop reason: HOSPADM

## 2022-02-15 RX ORDER — POTASSIUM CHLORIDE 14.9 MG/ML
20 INJECTION INTRAVENOUS ONCE
Status: COMPLETED | OUTPATIENT
Start: 2022-02-15 | End: 2022-02-15

## 2022-02-15 RX ORDER — NEOSTIGMINE METHYLSULFATE 1 MG/ML
INJECTION, SOLUTION INTRAVENOUS AS NEEDED
Status: DISCONTINUED | OUTPATIENT
Start: 2022-02-15 | End: 2022-02-15 | Stop reason: HOSPADM

## 2022-02-15 RX ORDER — LEVOFLOXACIN 500 MG/1
500 TABLET, FILM COATED ORAL DAILY
Qty: 7 TABLET | Refills: 0 | Status: SHIPPED | OUTPATIENT
Start: 2022-02-15 | End: 2022-02-22

## 2022-02-15 RX ORDER — SODIUM CHLORIDE, SODIUM LACTATE, POTASSIUM CHLORIDE, CALCIUM CHLORIDE 600; 310; 30; 20 MG/100ML; MG/100ML; MG/100ML; MG/100ML
75 INJECTION, SOLUTION INTRAVENOUS CONTINUOUS
Status: DISCONTINUED | OUTPATIENT
Start: 2022-02-15 | End: 2022-02-15 | Stop reason: HOSPADM

## 2022-02-15 RX ORDER — NALOXONE HYDROCHLORIDE 0.4 MG/ML
0.1 INJECTION, SOLUTION INTRAMUSCULAR; INTRAVENOUS; SUBCUTANEOUS
Status: DISCONTINUED | OUTPATIENT
Start: 2022-02-15 | End: 2022-02-15 | Stop reason: HOSPADM

## 2022-02-15 RX ORDER — SODIUM CHLORIDE 0.9 % (FLUSH) 0.9 %
5-40 SYRINGE (ML) INJECTION AS NEEDED
Status: DISCONTINUED | OUTPATIENT
Start: 2022-02-15 | End: 2022-02-16 | Stop reason: HOSPADM

## 2022-02-15 RX ORDER — CEFAZOLIN SODIUM/WATER 2 G/20 ML
SYRINGE (ML) INTRAVENOUS AS NEEDED
Status: DISCONTINUED | OUTPATIENT
Start: 2022-02-15 | End: 2022-02-15 | Stop reason: HOSPADM

## 2022-02-15 RX ORDER — MORPHINE SULFATE 4 MG/ML
2 INJECTION INTRAVENOUS
Status: DISCONTINUED | OUTPATIENT
Start: 2022-02-15 | End: 2022-02-16 | Stop reason: HOSPADM

## 2022-02-15 RX ORDER — ONDANSETRON 4 MG/1
4 TABLET, ORALLY DISINTEGRATING ORAL
Status: DISCONTINUED | OUTPATIENT
Start: 2022-02-15 | End: 2022-02-15 | Stop reason: SDUPTHER

## 2022-02-15 RX ORDER — DEXAMETHASONE SODIUM PHOSPHATE 4 MG/ML
INJECTION, SOLUTION INTRA-ARTICULAR; INTRALESIONAL; INTRAMUSCULAR; INTRAVENOUS; SOFT TISSUE
Status: COMPLETED | OUTPATIENT
Start: 2022-02-15 | End: 2022-02-15

## 2022-02-15 RX ORDER — ROCURONIUM BROMIDE 10 MG/ML
INJECTION, SOLUTION INTRAVENOUS AS NEEDED
Status: DISCONTINUED | OUTPATIENT
Start: 2022-02-15 | End: 2022-02-15 | Stop reason: HOSPADM

## 2022-02-15 RX ORDER — POTASSIUM CHLORIDE 20 MEQ/1
20 TABLET, EXTENDED RELEASE ORAL 2 TIMES DAILY
Status: DISCONTINUED | OUTPATIENT
Start: 2022-02-15 | End: 2022-02-16

## 2022-02-15 RX ORDER — DIPHENHYDRAMINE HYDROCHLORIDE 50 MG/ML
12.5 INJECTION, SOLUTION INTRAMUSCULAR; INTRAVENOUS
Status: DISCONTINUED | OUTPATIENT
Start: 2022-02-15 | End: 2022-02-15 | Stop reason: HOSPADM

## 2022-02-15 RX ORDER — DIPHENHYDRAMINE HCL 25 MG
25 CAPSULE ORAL
Status: DISCONTINUED | OUTPATIENT
Start: 2022-02-15 | End: 2022-02-16 | Stop reason: HOSPADM

## 2022-02-15 RX ORDER — ACETAMINOPHEN 500 MG
1000 TABLET ORAL ONCE
Status: DISCONTINUED | OUTPATIENT
Start: 2022-02-15 | End: 2022-02-15 | Stop reason: HOSPADM

## 2022-02-15 RX ADMIN — BUPIVACAINE HYDROCHLORIDE AND EPINEPHRINE BITARTRATE 25 ML: 2.5; .005 INJECTION, SOLUTION EPIDURAL; INFILTRATION; INTRACAUDAL; PERINEURAL at 10:23

## 2022-02-15 RX ADMIN — FENTANYL CITRATE 50 MCG: 50 INJECTION INTRAMUSCULAR; INTRAVENOUS at 10:16

## 2022-02-15 RX ADMIN — DEXAMETHASONE SODIUM PHOSPHATE 4 MG: 4 INJECTION, SOLUTION INTRA-ARTICULAR; INTRALESIONAL; INTRAMUSCULAR; INTRAVENOUS; SOFT TISSUE at 10:23

## 2022-02-15 RX ADMIN — SODIUM CHLORIDE, PRESERVATIVE FREE 10 ML: 5 INJECTION INTRAVENOUS at 21:09

## 2022-02-15 RX ADMIN — PHENYLEPHRINE HYDROCHLORIDE 100 MCG: 10 INJECTION INTRAVENOUS at 10:18

## 2022-02-15 RX ADMIN — Medication 3 MG: at 12:03

## 2022-02-15 RX ADMIN — MORPHINE SULFATE 2 MG: 4 INJECTION INTRAVENOUS at 21:09

## 2022-02-15 RX ADMIN — PROPOFOL 200 MG: 10 INJECTION, EMULSION INTRAVENOUS at 10:17

## 2022-02-15 RX ADMIN — PANTOPRAZOLE SODIUM 40 MG: 40 TABLET, DELAYED RELEASE ORAL at 17:46

## 2022-02-15 RX ADMIN — Medication 2 G: at 10:27

## 2022-02-15 RX ADMIN — FENTANYL CITRATE 50 MCG: 50 INJECTION INTRAMUSCULAR; INTRAVENOUS at 11:13

## 2022-02-15 RX ADMIN — ONDANSETRON 4 MG: 2 INJECTION INTRAMUSCULAR; INTRAVENOUS at 10:34

## 2022-02-15 RX ADMIN — HYDROMORPHONE HYDROCHLORIDE 0.5 MG: 2 INJECTION INTRAMUSCULAR; INTRAVENOUS; SUBCUTANEOUS at 12:16

## 2022-02-15 RX ADMIN — HYDROMORPHONE HYDROCHLORIDE 0.5 MG: 2 INJECTION INTRAMUSCULAR; INTRAVENOUS; SUBCUTANEOUS at 12:36

## 2022-02-15 RX ADMIN — PHENYLEPHRINE HYDROCHLORIDE 100 MCG: 10 INJECTION INTRAVENOUS at 10:55

## 2022-02-15 RX ADMIN — SODIUM CHLORIDE 500 ML: 900 INJECTION, SOLUTION INTRAVENOUS at 15:40

## 2022-02-15 RX ADMIN — POTASSIUM CHLORIDE 20 MEQ: 14.9 INJECTION, SOLUTION INTRAVENOUS at 15:39

## 2022-02-15 RX ADMIN — KETAMINE HYDROCHLORIDE 30 MG: 50 INJECTION INTRAMUSCULAR; INTRAVENOUS at 10:34

## 2022-02-15 RX ADMIN — DEXAMETHASONE SODIUM PHOSPHATE 4 MG: 4 INJECTION, SOLUTION INTRA-ARTICULAR; INTRALESIONAL; INTRAMUSCULAR; INTRAVENOUS; SOFT TISSUE at 10:26

## 2022-02-15 RX ADMIN — MORPHINE SULFATE 2 MG: 4 INJECTION INTRAVENOUS at 15:35

## 2022-02-15 RX ADMIN — DEXAMETHASONE SODIUM PHOSPHATE 4 MG: 4 INJECTION, SOLUTION INTRAMUSCULAR; INTRAVENOUS at 10:25

## 2022-02-15 RX ADMIN — LIDOCAINE HYDROCHLORIDE 80 MG: 20 INJECTION, SOLUTION EPIDURAL; INFILTRATION; INTRACAUDAL; PERINEURAL at 10:17

## 2022-02-15 RX ADMIN — HYDROCODONE BITARTRATE AND ACETAMINOPHEN 1 TABLET: 10; 325 TABLET ORAL at 17:43

## 2022-02-15 RX ADMIN — SODIUM CHLORIDE, PRESERVATIVE FREE 10 ML: 5 INJECTION INTRAVENOUS at 15:40

## 2022-02-15 RX ADMIN — SODIUM CHLORIDE, SODIUM LACTATE, POTASSIUM CHLORIDE, AND CALCIUM CHLORIDE: 600; 310; 30; 20 INJECTION, SOLUTION INTRAVENOUS at 10:06

## 2022-02-15 RX ADMIN — MIDAZOLAM 2 MG: 1 INJECTION INTRAMUSCULAR; INTRAVENOUS at 07:28

## 2022-02-15 RX ADMIN — BUPIVACAINE HYDROCHLORIDE AND EPINEPHRINE 25 ML: 2.5; 5 INJECTION, SOLUTION EPIDURAL; INFILTRATION; INTRACAUDAL; PERINEURAL at 10:26

## 2022-02-15 RX ADMIN — GLYCOPYRROLATE 0.4 MG: 0.2 INJECTION, SOLUTION INTRAMUSCULAR; INTRAVENOUS at 12:03

## 2022-02-15 RX ADMIN — ROCURONIUM BROMIDE 50 MG: 10 INJECTION, SOLUTION INTRAVENOUS at 10:17

## 2022-02-15 RX ADMIN — POTASSIUM CHLORIDE 20 MEQ: 20 TABLET, EXTENDED RELEASE ORAL at 17:45

## 2022-02-15 NOTE — PROGRESS NOTES
02/15/22 1347   Dual Skin Pressure Injury Assessment   Dual Skin Pressure Injury Assessment WDL   Second Care Provider (Based on [address] [place] [address]) Destiny Her, RN   Skin Integumentary   Skin Integumentary (WDL) X    Pressure  Injury Documentation No Pressure Injury Noted-Pressure Ulcer Prevention Initiated   Skin Integrity Incision (comment)  (midline abd)   Wound Prevention and Protection Methods   Orientation of Wound Prevention Posterior   Location of Wound Prevention Sacrum/Coccyx   Dressing Present  No   Wound Offloading (Prevention Methods) Bed, pressure reduction mattress   Musculoskeletal   Musculoskeletal (WDL) WDL

## 2022-02-15 NOTE — PERIOP NOTES
TRANSFER - OUT REPORT:    Verbal report given to Janeth RN on [employer]  being transferred to [facility] for routine progression of care       Report consisted of patients Situation, Background, Assessment and   Recommendations(SBAR). Information from the following report(s) SBAR, OR Summary, Intake/Output, MAR, Recent Results and Cardiac Rhythm sinus was reviewed with the receiving nurse. Lines:   Peripheral IV 02/15/22 Left;Posterior Wrist (Active)   Site Assessment Clean, dry, & intact 02/15/22 1214   Phlebitis Assessment 0 02/15/22 1214   Infiltration Assessment 0 02/15/22 1214   Dressing Status Clean, dry, & intact 02/15/22 1214   Dressing Type Tape;Transparent 02/15/22 1214   Hub Color/Line Status Patent 02/15/22 1214        Opportunity for questions and clarification was provided. Patient transported with:   O2 @ 2 liters  Tech    VTE prophylaxis orders have been written for [employer]. Patient and family given floor number and nurses name. Family updated re: pt status after security code verified.

## 2022-02-15 NOTE — PERIOP NOTES
Patient has spoken with [facility] NAREN. Consent has been verified, signed and witnessed by this RN Alyssa Hogan). 2 mg of Versed given SIVP per orders. Pulse ox monitor in place & tracing appropriately. Bed in low, locked position with side rails up x 2 and call light in reach. Instructed pt to call with any needs and to remain in bed. Verbalizes understanding. Family at bedside.

## 2022-02-15 NOTE — BRIEF OP NOTE
Brief Postoperative Note    Patient: Alda Nicolas  YOB: 1975  MRN: [id number]    Date of Procedure: 2/15/2022     Pre-Op Diagnosis: Malignant neoplasm of ovary, unspecified laterality ([facility] [address] [C56.9]    Post-Op Diagnosis: stage 4 colon cancer with peritoneal dx, 'frozen'pelvis  Bilateral ureteral obstruction/pelvis      Procedure(s):  LAPAROTOMY BILATERAL SALPINGO OOPHORECTOMY/ POSS BIOPSY  CYSTOSCOPY BILATERAL URETERAL STENT INSERTION  Right 24x7  Left 24x4. 8    Surgeon(s):  Amalia Mendez MD    Surgical Assistant: None    Anesthesia: General     Estimated Blood Loss (mL): less than 50     Complications: None    Specimens:   ID Type Source Tests Collected by Time Destination   1 : Trocar Site Preservative Abdomen  Amalia Mendez MD 2/15/2022 1105 Pathology   2 : abdominal wall implant Preservative Abdomen  Amalia Mendez MD 2/15/2022 1115 Pathology   3 : bilateral ovaries Preservative   Amalia Mendez MD 2/15/2022 1128 Pathology        Implants:   Implant Name Type Inv. Item Serial No.  Lot No. LRB No. Used Action   STENT URET 7F 24CM -- [facility] [id number] - [id number]  Imer Christine 7F 24CM -- [facility] [id number]  [employer] [id number] Right 1 Implanted   STENT URET 4.8F 24CM -- [facility] [id number] - [id number]  STENT URET 4.8F 24CM -- [facility] [id number]  [employer] [id number] Left 1 Implanted       Drains: * No LDAs found *    Findings: abnormal appearing ovaries, not clearly malignant. Peritoneal dx as previously described. Right colon involvement, at least externally.  Sigmoid colon indurated and fixed in pelvis  No evidence obstruction  Bilateral hydronephrosis, stents placed bilaterally without apparent complications, with fluoroscopic guidance    Electronically Signed by Dariana Schumacher MD on 2/15/2022 at 12:14 PM

## 2022-02-15 NOTE — ANESTHESIA POSTPROCEDURE EVALUATION
Procedure(s):  LAPAROTOMY BILATERAL SALPINGO OOPHORECTOMY/ POSS BIOPSY  CYSTOSCOPY BILATERAL URETERAL STENT INSERTION. general    Anesthesia Post Evaluation      Multimodal analgesia: multimodal analgesia used between 6 hours prior to anesthesia start to PACU discharge  Patient location during evaluation: bedside  Patient participation: complete - patient participated  Level of consciousness: awake  Pain management: adequate  Airway patency: patent  Anesthetic complications: no  Cardiovascular status: acceptable  Respiratory status: spontaneous ventilation and acceptable  Hydration status: acceptable  Post anesthesia nausea and vomiting:  none      INITIAL Post-op Vital signs:   Vitals Value Taken Time   /71 02/15/22 1309   Temp 36.7 °C (98 °F) 02/15/22 1249   Pulse 55 02/15/22 1311   Resp 15 02/15/22 1304   SpO2 100 % 02/15/22 1311   Vitals shown include unvalidated device data.

## 2022-02-15 NOTE — ANESTHESIA PREPROCEDURE EVALUATION
Anesthetic History   No history of anesthetic complications            Review of Systems / Medical History  Patient summary reviewed and pertinent labs reviewed    Pulmonary  Within defined limits                 Neuro/Psych   Within defined limits           Cardiovascular  Within defined limits                Exercise tolerance: >4 METS     GI/Hepatic/Renal     GERD: well controlled    Renal disease (Creat 1.7. Obstructive. Right Hydronephrosis.): CRI       Endo/Other        Cancer (Ovarian Cancer) and anemia (Hgb 10.8)     Other Findings            Physical Exam    Airway  Mallampati: II  TM Distance: > 6 cm  Neck ROM: normal range of motion   Mouth opening: Normal     Cardiovascular  Regular rate and rhythm,  S1 and S2 normal,  no murmur, click, rub, or gallop  Rhythm: regular  Rate: normal         Dental  No notable dental hx       Pulmonary  Breath sounds clear to auscultation               Abdominal  GI exam deferred       Other Findings            Anesthetic Plan    ASA: 2  Anesthesia type: general      Post-op pain plan if not by surgeon: peripheral nerve block single    Induction: Intravenous  Anesthetic plan and risks discussed with: Patient      Plan GETA with TAP blocks after induction.  Multimodal analgesia with preop Tylenol and intraop Ketamine

## 2022-02-15 NOTE — PROGRESS NOTES
RNCM reviewed chart. Patient POD 1, S/P Laparotomy bilateral salpingo oophorectomy/poss bx cystoscopy bilat ureteral stent insertion. Patient independent in mobility and ADL's prior to admission. No supportive needs identified to CM. Please consult CM for needs should they arise.          Care Management Interventions  PCP Verified by CM:  (Dr Evy Haider  [phone])  Mode of Transport at Discharge: Self  Transition of Care Consult (CM Consult): Discharge Planning  Support Systems: Spouse/Significant Other (Patient's mother:  [phone])  Confirm Follow Up Transport: Self  The Plan for Transition of Care is Related to the Following Treatment Goals : return to baseline  The Patient and/or Patient Representative was Provided with a Choice of Provider and Agrees with the Discharge Plan?: Yes  Freedom of Choice List was Provided with Basic Dialogue that Supports the Patient's Individualized Plan of Care/Goals, Treatment Preferences and Shares the Quality Data Associated with the Providers?: Yes   Resource Information Provided?: No  Discharge Location  Patient Expects to be Discharged to[de-identified] Home with family assistance

## 2022-02-15 NOTE — ANESTHESIA PROCEDURE NOTES
Peripheral Block    Start time: 2/15/2022 10:21 AM  End time: 2/15/2022 10:23 AM  Performed by: Joselyn Subramanian MD  Authorized by: Joselyn Subramanian MD       Pre-procedure:    Indications: at surgeon's request and post-op pain management    Preanesthetic Checklist: patient identified, risks and benefits discussed, site marked, timeout performed, anesthesia consent given and patient being monitored    Timeout Time: 10:21 EST          Block Type:   Block Type:  TAP  Laterality:  Left  Monitoring:  Standard ASA monitoring, continuous pulse ox, frequent vital sign checks, heart rate and oxygen  Injection Technique:  Single shot  Procedures: ultrasound guided    Prep: chlorhexidine    Location:  Abdominal  Needle Type:  Stimuplex  Needle Gauge:  22 G  Needle Localization:  Ultrasound guidance  Medication Injected:  Bupivacaine 0.25% -EPINEPHrine 1:200,000 (SENSORCAINE) mg injection, 25 mL  dexamethasone (DECADRON) 4 mg/mL injection, 4 mg  Med Admin Time: 2/15/2022 10:23 AM    Assessment:  Number of attempts:  1  Injection Assessment:  Incremental injection every 5 mL, local visualized surrounding nerve on ultrasound, negative aspiration for blood, no paresthesia, no intravascular symptoms and ultrasound image on chart  Patient tolerance:  Patient tolerated the procedure well with no immediate complications

## 2022-02-15 NOTE — ANESTHESIA PROCEDURE NOTES
Peripheral Block    Start time: 2/15/2022 10:24 AM  End time: 2/15/2022 10:26 AM  Performed by: Lolly Pearson MD  Authorized by: Lolly Pearson MD       Pre-procedure:    Indications: at surgeon's request and post-op pain management    Preanesthetic Checklist: patient identified, risks and benefits discussed, site marked, timeout performed, anesthesia consent given and patient being monitored    Timeout Time: 10:24 EST          Block Type:   Block Type:  TAP  Laterality:  Right  Monitoring:  Standard ASA monitoring, continuous pulse ox, frequent vital sign checks, heart rate and oxygen  Injection Technique:  Single shot  Procedures: ultrasound guided    Prep: chlorhexidine    Location:  Abdominal  Needle Type:  Stimuplex  Needle Gauge:  22 G  Needle Localization:  Ultrasound guidance  Medication Injected:  Bupivacaine 0.25% -EPINEPHrine 1:200,000 (SENSORCAINE) mg injection, 25 mL  dexamethasone (DECADRON) 4 mg/mL injection, 4 mg  Med Admin Time: 2/15/2022 10:26 AM    Assessment:  Number of attempts:  1  Injection Assessment:  Incremental injection every 5 mL, local visualized surrounding nerve on ultrasound, negative aspiration for blood, no paresthesia, no intravascular symptoms and ultrasound image on chart  Patient tolerance:  Patient tolerated the procedure well with no immediate complications

## 2022-02-16 ENCOUNTER — PATIENT OUTREACH (OUTPATIENT)
Dept: CASE MANAGEMENT | Age: 47
End: 2022-02-16

## 2022-02-16 VITALS
RESPIRATION RATE: 16 BRPM | HEART RATE: 57 BPM | HEIGHT: 64 IN | OXYGEN SATURATION: 99 % | TEMPERATURE: 98.4 F | WEIGHT: 128 LBS | DIASTOLIC BLOOD PRESSURE: 69 MMHG | SYSTOLIC BLOOD PRESSURE: 101 MMHG | BODY MASS INDEX: 21.85 KG/M2

## 2022-02-16 LAB
ANION GAP SERPL CALC-SCNC: 2 MMOL/L (ref 7–16)
BASOPHILS # BLD: 0 K/UL (ref 0–0.2)
BASOPHILS NFR BLD: 0 % (ref 0–2)
BUN SERPL-MCNC: 11 MG/DL (ref 6–23)
CALCIUM SERPL-MCNC: 8.8 MG/DL (ref 8.3–10.4)
CHLORIDE SERPL-SCNC: 108 MMOL/L (ref 98–107)
CO2 SERPL-SCNC: 29 MMOL/L (ref 21–32)
CREAT SERPL-MCNC: 1.4 MG/DL (ref 0.6–1)
DIFFERENTIAL METHOD BLD: ABNORMAL
EOSINOPHIL # BLD: 0.1 K/UL (ref 0–0.8)
EOSINOPHIL NFR BLD: 1 % (ref 0.5–7.8)
ERYTHROCYTE [DISTWIDTH] IN BLOOD BY AUTOMATED COUNT: 12.6 % (ref 11.9–14.6)
GLUCOSE SERPL-MCNC: 117 MG/DL (ref 65–100)
HCT VFR BLD AUTO: 29.6 % (ref 35.8–46.3)
HGB BLD-MCNC: 9.1 G/DL (ref 11.7–15.4)
IMM GRANULOCYTES # BLD AUTO: 0 K/UL (ref 0–0.5)
IMM GRANULOCYTES NFR BLD AUTO: 0 % (ref 0–5)
LYMPHOCYTES # BLD: 1.2 K/UL (ref 0.5–4.6)
LYMPHOCYTES NFR BLD: 11 % (ref 13–44)
MCH RBC QN AUTO: 27.3 PG (ref 26.1–32.9)
MCHC RBC AUTO-ENTMCNC: 30.7 G/DL (ref 31.4–35)
MCV RBC AUTO: 88.9 FL (ref 79.6–97.8)
MONOCYTES # BLD: 1 K/UL (ref 0.1–1.3)
MONOCYTES NFR BLD: 9 % (ref 4–12)
NEUTS SEG # BLD: 8.6 K/UL (ref 1.7–8.2)
NEUTS SEG NFR BLD: 78 % (ref 43–78)
NRBC # BLD: 0 K/UL (ref 0–0.2)
PLATELET # BLD AUTO: 195 K/UL (ref 150–450)
PMV BLD AUTO: 10.5 FL (ref 9.4–12.3)
POTASSIUM SERPL-SCNC: 5.3 MMOL/L (ref 3.5–5.1)
RBC # BLD AUTO: 3.33 M/UL (ref 4.05–5.2)
SODIUM SERPL-SCNC: 139 MMOL/L (ref 136–145)
WBC # BLD AUTO: 10.9 K/UL (ref 4.3–11.1)

## 2022-02-16 PROCEDURE — 85025 COMPLETE CBC W/AUTO DIFF WBC: CPT

## 2022-02-16 PROCEDURE — 74011250637 HC RX REV CODE- 250/637: Performed by: OBSTETRICS & GYNECOLOGY

## 2022-02-16 PROCEDURE — 74011000250 HC RX REV CODE- 250: Performed by: OBSTETRICS & GYNECOLOGY

## 2022-02-16 PROCEDURE — G0378 HOSPITAL OBSERVATION PER HR: HCPCS

## 2022-02-16 RX ORDER — HYDROCODONE BITARTRATE AND ACETAMINOPHEN 10; 325 MG/1; MG/1
1 TABLET ORAL
Qty: 30 TABLET | Refills: 0 | Status: SHIPPED | OUTPATIENT
Start: 2022-02-16 | End: 2022-02-19

## 2022-02-16 RX ORDER — AMOXICILLIN 250 MG
2 CAPSULE ORAL DAILY
Qty: 30 TABLET | Refills: 1 | Status: SHIPPED | OUTPATIENT
Start: 2022-02-17 | End: 2022-03-04

## 2022-02-16 RX ADMIN — HYDROCODONE BITARTRATE AND ACETAMINOPHEN 1 TABLET: 10; 325 TABLET ORAL at 00:11

## 2022-02-16 RX ADMIN — PANTOPRAZOLE SODIUM 40 MG: 40 TABLET, DELAYED RELEASE ORAL at 08:02

## 2022-02-16 RX ADMIN — HYDROCODONE BITARTRATE AND ACETAMINOPHEN 1 TABLET: 10; 325 TABLET ORAL at 08:03

## 2022-02-16 RX ADMIN — SODIUM CHLORIDE, PRESERVATIVE FREE 10 ML: 5 INJECTION INTRAVENOUS at 05:01

## 2022-02-16 RX ADMIN — SENNOSIDES AND DOCUSATE SODIUM 2 TABLET: 8.6; 5 TABLET ORAL at 08:02

## 2022-02-16 RX ADMIN — HYDROCODONE BITARTRATE AND ACETAMINOPHEN 1 TABLET: 10; 325 TABLET ORAL at 04:03

## 2022-02-16 NOTE — DISCHARGE INSTRUCTIONS
Patient Education   Patient Education     DISCHARGE SUMMARY from Nurse    PATIENT INSTRUCTIONS:    After general anesthesia or intravenous sedation, for 24 hours or while taking prescription Narcotics:  · Limit your activities  · Do not drive and operate hazardous machinery  · Do not make important personal or business decisions  · Do  not drink alcoholic beverages  · If you have not urinated within 8 hours after discharge, please contact your surgeon on call. Report the following to your surgeon:  · Excessive pain, swelling, redness or odor of or around the surgical area  · Temperature over 100.5  · Nausea and vomiting lasting longer than 4 hours or if unable to take medications  · Any signs of decreased circulation or nerve impairment to extremity: change in color, persistent  numbness, tingling, coldness or increase pain  · Any questions    What to do at Home:  Recommended activity: No lifting, Driving, or Strenuous exercise until follow up appointment    If you experience any of the following symptoms fever of 101.4 or greater, pain unrelieved by medication, yellow or green discharge from incision site, please follow up with Dr. Josemanuel Monroy. *  Please give a list of your current medications to your Primary Care Provider. *  Please update this list whenever your medications are discontinued, doses are      changed, or new medications (including over-the-counter products) are added. *  Please carry medication information at all times in case of emergency situations. These are general instructions for a healthy lifestyle:    No smoking/ No tobacco products/ Avoid exposure to second hand smoke  Surgeon General's Warning:  Quitting smoking now greatly reduces serious risk to your health.     Obesity, smoking, and sedentary lifestyle greatly increases your risk for illness    A healthy diet, regular physical exercise & weight monitoring are important for maintaining a healthy lifestyle    You may be retaining fluid if you have a history of heart failure or if you experience any of the following symptoms:  Weight gain of 3 pounds or more overnight or 5 pounds in a week, increased swelling in our hands or feet or shortness of breath while lying flat in bed. Please call your doctor as soon as you notice any of these symptoms; do not wait until your next office visit. The discharge information has been reviewed with the patient. The patient verbalized understanding. Discharge medications reviewed with the patient and appropriate educational materials and side effects teaching were provided. ___________________________________________________________________________________________________________________________________     Ureteral Stent Placement: What to Expect at [address] [place] [address]     A ureteral (say \"you-REE-ter-\") stent is a thin, hollow tube that is placed in the ureter to help urine pass from the kidney into the bladder. Ureters are the tubes that connect the kidneys to the bladder. You may have a small amount of blood in your urine for 1 to 3 days after the procedure. While the stent is in place, you may have to urinate more often, feel a sudden need to urinate, or feel like you can't completely empty your bladder. You may feel some pain when you urinate or do strenuous activity. You also may notice a small amount of blood in your urine after strenuous activities. These side effects usually don't prevent people from doing their normal daily activities. You may have a thin string coming out of your urethra. Your urethra is the tube that carries urine from your bladder to outside your body. This string is attached to the stent. Try not to pull on the string. It will be used to pull out the stent when you no longer need it. After the procedure, urine may flow better from your kidneys to your bladder. A ureteral stent may be left in place for several days or for as long as several months. Your doctor will take it out when you no longer need it. Or, in some cases, it may be taken out at home. This care sheet gives you a general idea about how long it will take for you to recover. But each person recovers at a different pace. Follow the steps below to get better as quickly as possible. How can you care for yourself at home? Activity    · Rest when you feel tired. Getting enough sleep will help you recover.     · Avoid strenuous activities, such as bicycle riding, jogging, weight lifting, or aerobic exercise, until your doctor says it is okay.     · Ask your doctor when you can drive again.     · Most people are able to return to work the day after the procedure. If your work requires intense activity, you may feel pain in your kidney area or get tired easily. If this happens, you may need to do less strenuous activities while the stent is in.     · Ask your doctor when it is okay for you to have sex. Diet    · You can eat your normal diet. If your stomach is upset, try bland, low-fat foods like plain rice, broiled chicken, toast, and yogurt.     · Drink plenty of fluids (unless your doctor tells you not to). Medicines    · Your doctor will tell you if and when you can restart your medicines. You will also get instructions about taking any new medicines.     · If you take aspirin or some other blood thinner, ask your doctor if and when to start taking it again. Make sure that you understand exactly what your doctor wants you to do.     · Be safe with medicines. Take pain medicines exactly as directed. ? If the doctor gave you a prescription medicine for pain, take it as prescribed. ? If you are not taking a prescription pain medicine, ask your doctor if you can take an over-the-counter medicine.     · If you think your pain medicine is making you sick to your stomach:  ? Take your medicine after meals (unless your doctor has told you not to). ? Ask your doctor for a different pain medicine.   · If your doctor prescribed antibiotics, take them as directed. Do not stop taking them just because you feel better. You need to take the full course of antibiotics. Follow-up care is a key part of your treatment and safety. Be sure to make and go to all appointments, and call your doctor if you are having problems. It's also a good idea to know your test results and keep a list of the medicines you take. When should you call for help? Call 911 anytime you think you may need emergency care. For example, call if:    · You passed out (lost consciousness).     · You have severe trouble breathing.     · You have sudden chest pain and shortness of breath, or you cough up blood.     · You have severe belly pain. Call your doctor now or seek immediate medical care if:    · Part or all of the stent comes out of your urethra.     · You have pain that does not get better after you take pain medicine.     · You have symptoms of a urinary infection. For example:  ? You have blood or pus in your urine. ? You have pain in your back just below your rib cage. This is called flank pain. ? You have a fever, chills, or body aches. ? It hurts to urinate. ? You have groin or belly pain.     · You cannot control when you urinate, or you leak urine. Watch closely for changes in your health, and be sure to contact your doctor if you have any problems. Where can you learn more? Go to http://www.gray.com/  Enter B869 in the search box to learn more about \"Ureteral Stent Placement: What to Expect at Home. \"  Current as of: February 10, 2021               Content Version: 13.0  © 0498-9814 Healthwise, Incorporated. Care instructions adapted under license by [employer] (which disclaims liability or warranty for this information).  If you have questions about a medical condition or this instruction, always ask your healthcare professional. [employer] disclaims any warranty or liability for your use of this information. Open Oophorectomy: What to Expect at [address] [facility] oophorectomy is surgery to remove one or both ovaries. Your doctor made a cut (incision) in your low belly to take out your ovaries. After surgery, you can expect to feel better and stronger each day. But you may need pain medicine for a week or two. You may get tired easily or have less energy than usual. This may last for several weeks after surgery. You will probably notice that your belly is swollen and puffy. This is common. The swelling will take several weeks to go down. You may take 4 to 6 weeks to fully recover. It's important to avoid lifting while you are recovering so that you can heal.  This care sheet gives you a general idea about how long it will take for you to recover. But each person recovers at a different pace. Follow the steps below to get better as quickly as possible. How can you care for yourself at home? Activity    · Rest when you feel tired. Getting enough sleep will help you recover.     · Try to walk each day. Start by walking a little more than you did the day before. Bit by bit, increase the amount you walk. Walking boosts blood flow and helps prevent pneumonia and constipation.     · Avoid strenuous activities, such as bicycle riding, jogging, weight lifting, or aerobic exercise, until your doctor says it is okay.     · Avoid lifting anything that would make you strain. This may include heavy grocery bags and milk containers, a heavy briefcase or backpack, cat litter or dog food bags, a vacuum , or a child.     · Hold a pillow over your incisions when you cough or take deep breaths. This will support your belly and decrease your pain. Do breathing exercises at home as instructed by your doctor. This will help prevent pneumonia.     · You will probably need to take 2 to 4 weeks off from work.  It depends on the type of work you do and how you feel.     · Your doctor will tell you when you can have sex again. Diet    · You can eat your normal diet. If your stomach is upset, try bland, low-fat foods like plain rice, broiled chicken, toast, and yogurt.     · Drink plenty of fluids (unless your doctor tells you not to).     · You may notice that your bowel movements are not regular right after your surgery. This is common. Try to avoid constipation and straining with bowel movements. You may want to take a fiber supplement every day. If you have not had a bowel movement after a couple of days, ask your doctor about taking a mild laxative. Medicines    · Your doctor will tell you if and when you can restart your medicines. You will also get instructions about taking any new medicines.     · If you take aspirin or some other blood thinner, ask your doctor if and when to start taking it again. Make sure that you understand exactly what your doctor wants you to do.     · Take pain medicines exactly as directed. ? If the doctor gave you a prescription medicine for pain, take it as prescribed. ? If you are not taking a prescription pain medicine, take an over-the-counter medicine such as acetaminophen (Tylenol), ibuprofen (Advil, Motrin), or naproxen (Aleve). Read and follow all instructions on the label. ? Do not take two or more pain medicines at the same time unless the doctor told you to. Many pain medicines contain acetaminophen, which is Tylenol. Too much acetaminophen (Tylenol) can be harmful.     · If you think your pain medicine is making you sick to your stomach:  ? Take your medicine after meals (unless your doctor tells you not to). ? Ask your doctor for a different pain medicine. Incision care    · If you have strips of tape on the cut (incision) the doctor made, leave the tape on for a week or until it falls off. Or follow your doctor's instructions for removing the tape.     · Wash the area daily with warm, soapy water, and pat it dry.  Don't use hydrogen peroxide or alcohol, which can slow healing. You may cover the area with a gauze bandage if it weeps or rubs against clothing. Change the bandage every day.     · Keep the area clean and dry. Other instructions    · Wear loose, comfortable clothing and avoid anything that puts pressure on your belly, such as a girdle, for a few weeks.     · You may want to use a heating pad on your belly to help with pain. Follow-up care is a key part of your treatment and safety. Be sure to make and go to all appointments, and call your doctor if you are having problems. It's also a good idea to know your test results and keep a list of the medicines you take. When should you call for help? Call 911 anytime you think you may need emergency care. For example, call if:    · You passed out (lost consciousness).     · You have chest pain, are short of breath, or cough up blood. Call your doctor now or seek immediate medical care if:    · You have pain that does not get better after you take pain medicine.     · You cannot pass stools or gas.     · You have vaginal discharge that has increased in amount or smells bad.     · You are sick to your stomach or cannot drink fluids.     · You have loose stitches, or your incision comes open.     · Bright red blood has soaked through the bandage over your incision.     · You have signs of infection, such as:  ? Increased pain, swelling, warmth, or redness. ? Red streaks leading from the incision. ? Pus draining from the incision. ? A fever.     · You have bright red vaginal bleeding that soaks one or more pads in an hour, or you have large clots.     · You have signs of a blood clot in your leg (called a deep vein thrombosis), such as:  ? Pain in your calf, back of the knee, thigh, or groin. ? Redness and swelling in your leg. Watch closely for changes in your health, and be sure to contact your doctor if you have any problems. Where can you learn more?   Go to http://www.gray.com/  Enter J270 in the search box to learn more about \"Open Oophorectomy: What to Expect at Home. \"  Current as of: February 11, 2021               Content Version: 13.0  © 1084-2292 Healthwise, Incorporated. Care instructions adapted under license by [employer] (which disclaims liability or warranty for this information). If you have questions about a medical condition or this instruction, always ask your healthcare professional. [place] [address] any warranty or liability for your use of this information.

## 2022-02-16 NOTE — PROGRESS NOTES
2/16/22 saw pt today with Dr. Rachel Escobar for initial medical oncology consult for peritoneal nodules (unknown primary). Imaging and pathology reviewed. Will send tissue for NGS testing with Jude including origin. She is post op yesterday with Dr. Cristhian Cho. Will arrange port placement in IR end of next week at the earliest.  Follow up 3/4 to review Caris and discuss treatment plan. Provided opportunity to ask questions and all were discussed. My contact information was provided and I encouraged her to call with any questions or concerns. Navigation will continue to follow. 2/17/22 Caris requisition completed and faxed. Confirmation received.

## 2022-02-16 NOTE — PROGRESS NOTES
Patient with discharge orders today. No supportive needs. Patient's family to provide transportation home. Patient has met all treatment goals and milestones. CM following until discharged today.       Care Management Interventions  PCP Verified by CM:  (Dr Yana Gilbert  [phone])  Mode of Transport at Discharge: Self  Transition of Care Consult (CM Consult): Discharge Planning  Support Systems: Spouse/Significant Other (Patient's mother:  [phone])  Confirm Follow Up Transport: Self  The Plan for Transition of Care is Related to the Following Treatment Goals : return to baseline  The Patient and/or Patient Representative was Provided with a Choice of Provider and Agrees with the Discharge Plan?: Yes  Freedom of Choice List was Provided with Basic Dialogue that Supports the Patient's Individualized Plan of Care/Goals, Treatment Preferences and Shares the Quality Data Associated with the Providers?: Yes  [place] Resource Information Provided?: No  Discharge Location  Patient Expects to be Discharged to[de-identified] Home with family assistance

## 2022-02-16 NOTE — PROGRESS NOTES
END OF SHIFT NOTE:    INTAKE/OUTPUT  02/15 0701 - 02/16 0700  In: 800 [I.V.:800]  Out: 1525 [Urine:1500]  Voiding: YES  Catheter: NO  Drain:              Flatus: Patient does not have flatus present. Stool:  0 occurrences. Characteristics:       Emesis: 0 occurrences. Characteristics:        VITAL SIGNS  Patient Vitals for the past 12 hrs:   Temp Pulse Resp BP SpO2   02/16/22 0415 98.6 °F (37 °C) (!) 58 18 110/72 98 %   02/15/22 2347 98.8 °F (37.1 °C) (!) 58 18 106/74 100 %   02/15/22 2109  63  121/68    02/15/22 1924 98.9 °F (37.2 °C) 65 18 124/69 100 %       Pain Assessment  Pain Intensity 1: 7 (02/16/22 0403)  Pain Location 1: Abdomen  Pain Intervention(s) 1: Medication (see MAR)  Patient Stated Pain Goal: 3    Ambulating  Yes    Shift report given to oncoming nurse at the bedside.     Stephanie Pathak RN

## 2022-02-16 NOTE — OP NOTES
[facility]  OPERATIVE REPORT    Name:  Winter Clay  MR#:  [id number]  :  1975  ACCOUNT #:  [de-identified]  DATE OF SERVICE:    PREOPERATIVE DIAGNOSIS:  Metastatic malignancy. POSTOPERATIVE DIAGNOSES:  1.  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 2.  Hydronephrosis. PROCEDURES PERFORMED:  1. Laparotomy, bilateral salpingo-oophorectomy. 2.  Cystoscopy with bilateral double-J ureteral stents placed under fluoroscopic guidance. SURGEON:  Jennifer Carney MD        ANESTHESIA:  General.    COMPLICATIONS:  None. SPECIMENS REMOVED:  Pathology, above. ESTIMATED BLOOD LOSS:  Minimal.    PACKS:  None. DRAINS:  Transurethral Simpson catheter discontinued at completion. DISPOSITION:  PACU. INDICATION:  This is a patient who had undergone a diagnostic laparoscopy with biopsies for an elevated CA-125 and an abnormal adnexal mass. She subsequently had peritoneal disease which was biopsied. Final pathology was high-grade adenocarcinoma with concerns for GI primary. She preoperatively had an EGD which was benign and her colonoscopy was reported up-to-date. She subsequently underwent a PET scan which only showed activity in the pelvis in the right adnexa along with hydroureter and was noted to have a rising creatinine preoperatively. The risks, benefits and alternatives, morbidities and mortalities to above were reviewed and she wished to proceed. FINDINGS:  Cystoscopy revealed a normal bladder. Laparotomy revealed the peritoneal disease as previously described. The upper abdomen was palpably normal.  She appeared to have disease on the right colon externally at least.  The pelvis was essentially frozen with significant induration and adhesion in the sigmoid area rising out of the pelvis, most consistent with a primary in that location. She appeared to have bilateral fibrosis of the peritoneum extending into the retroperitoneal space. Both adnexae were removed but were not obvious primary sources for her disease. Cystoscopy revealed a normal bladder. The 5-Zambian ureteral catheters were placed bilaterally without major difficulty over a wire. Subsequently, the catheters were removed and double-J stents placed. On the right, a 24 x 7 stent was placed over the wire. On the left, a 4.8 x 24 stent was placed as the 7-Zambian could not be placed. Fluoroscopic guidance was used for both these portions of the procedure. PROCEDURE:  The patient was taken to the operating room and placed under general anesthesia. She was sterilely prepped and draped and cystoscopy was performed. The 5-Zambian ureteral catheters were placed without difficulty. Simpson was placed. Attention was turned to the abdomen. A vertical midline incision made including the prior laparoscopic site in the midline. That site was removed. Subsequently, the peritoneal cavity was entered. An Ramiro retractor was placed. The findings were as above. The bowel was moved out of the abdomen. The IPs were isolated. The patient had previously had tubes and uterus removed. Subsequently, the IPs were grasped with an Enseal, double cauterized, and transected. The abdomen was closed with a modified Smead-Doyle closure after the bowel was allowed to fall to its normal location. Subcutaneous tissue was reapproximated with Vicryl followed by subcuticular and Dermabond. Attention was turned to the bladder. The Simpson was removed. Cystoscopy was repeated. Wires were placed over the 5-Zambian ureteral catheters and the catheters were removed. Subsequently, the stents were placed over the wires under fluoroscopic guidance without apparent complications or difficulties. Both stents appeared to have efflux at completion. The bladder was drained with the cystoscope.   Sponge, lap and needle counts were correct and the patient tolerated the procedure well and was taken to PACU stable per Anesthesia.       MD AMOS [id number]  D:  02/16/2022 12:48  T:  02/16/2022 17:11  JOB #:  [id number]

## 2022-02-16 NOTE — PROGRESS NOTES
Laurie Turner  Admission Date: 2/15/2022               Daily Progress Note: 2/16/2022    LAB  Recent Labs     02/16/22  0525 02/15/22  2354 02/15/22  1359 02/15/22  1055   WBC 10.9  --   --   --    HGB 9.1*  --  10.3*  --    HCT 29.6*  --  33.5*  --      --   --   --    NA  --  139  --  142   K  --  5.3*  --  2.9*   CL  --  108*  --  109*   CO2  --  29  --  24   BUN  --  11  --  10   CREA  --  1.40*  --  1.30*   GLU  --  117*  --  113*         Visit Vitals  /69   Pulse (!) 57   Temp 98.4 °F (36.9 °C)   Resp 16   Ht 5' 4\" (1.626 m)   Wt 128 lb (58.1 kg)   LMP 06/18/2020   SpO2 99%   BMI 21.97 kg/m²       Current Facility-Administered Medications   Medication Dose Route Frequency Provider Last Rate Last Admin    acetaminophen (TYLENOL) tablet 1,000 mg  1,000 mg Oral Q6H PRN Everette Harrell MD        pantoprazole (PROTONIX) tablet 40 mg  40 mg Oral BID Everette Harrell MD   40 mg at 02/15/22 1746    sodium chloride (NS) flush 5-40 mL  5-40 mL IntraVENous Q8H Everette Harrell MD   10 mL at 02/16/22 0501    sodium chloride (NS) flush 5-40 mL  5-40 mL IntraVENous PRN Everette Harrell MD        HYDROcodone-acetaminophen [facility])  mg tablet 1 Tablet  1 Tablet Oral Q4H PRN Everette Harrell MD   1 Tablet at 02/16/22 0403    morphine injection 2 mg  2 mg IntraVENous Q4H PRN Everette Harrell MD   2 mg at 02/15/22 2109    naloxone (NARCAN) injection 0.4 mg  0.4 mg IntraVENous PRN Everette Harrell MD        ondansetron (ZOFRAN ODT) tablet 4 mg  4 mg Oral Q4H PRN Everette Harrell MD        diphenhydrAMINE (BENADRYL) capsule 25 mg  25 mg Oral Q4H PRN Everette Harrell MD        senna-docusate (PERICOLACE) 8.6-50 mg per tablet 2 Tablet  2 Tablet Oral DAILY Everette Harrell MD        potassium chloride (K-DUR, KLOR-CON M20) SR tablet 20 mEq  20 mEq Oral BID Everette Harrell MD   20 mEq at 02/15/22 1745       No Known Allergies    NOTE  No issues overnight  Labs stable    dc    Hanna Guan MD  2/16/2022

## 2022-02-16 NOTE — PROGRESS NOTES
END OF SHIFT NOTE:    INTAKE/OUTPUT  No intake/output data recorded. Voiding: YES  Catheter: NO  Drain:              Flatus: Patient does not have flatus present. Stool:  0 occurrences. Characteristics:       Emesis: 0 occurrences. Characteristics:        VITAL SIGNS  Patient Vitals for the past 12 hrs:   Temp Pulse Resp BP SpO2   02/15/22 1514 97.6 °F (36.4 °C) 67 16 135/81 100 %   02/15/22 1322 97.9 °F (36.6 °C) (!) 54 17 (!) 142/80 100 %   02/15/22 1304  (!) 53 15 132/73 100 %   02/15/22 1259  (!) 53 16 130/72 100 %   02/15/22 1254  64 14 133/74 100 %   02/15/22 1249 98 °F (36.7 °C) (!) 48 16 (!) 148/69 100 %   02/15/22 1244  (!) 54 15 (!) 155/68 100 %   02/15/22 1239  (!) 50 16 137/74 100 %   02/15/22 1234  (!) 51 14 (!) 147/79 100 %   02/15/22 1229  (!) 51 15 (!) 141/81 100 %   02/15/22 1224  (!) 54 12 (!) 140/75 100 %   02/15/22 1219  (!) 57 13 (!) 157/90 100 %   02/15/22 1214 98.8 °F (37.1 °C) 60 12 (!) 153/80 100 %       Pain Assessment  Pain Intensity 1: 3 (02/15/22 1840)  Pain Location 1: Abdomen  Pain Intervention(s) 1: Medication (see MAR)  Patient Stated Pain Goal: 3    Ambulating  Yes    Shift report given to oncoming nurse at the bedside.     Mary Garcia RN

## 2022-02-16 NOTE — PROGRESS NOTES
TRANSFER - IN REPORT:    Verbal report received from Bryson Benites RN on [employer]  being received from PACU for routine progression of care      Report consisted of patients Situation, Background, Assessment and   Recommendations(SBAR). Information from the following report(s) SBAR, Intake/Output, MAR, Recent Results and Med Rec Status was reviewed with the receiving nurse. Opportunity for questions and clarification was provided. Assessment completed upon patients arrival to unit and care assumed.

## 2022-02-21 ENCOUNTER — HOSPITAL ENCOUNTER (OUTPATIENT)
Dept: LAB | Age: 47
Discharge: HOME | End: 2022-02-21
Payer: COMMERCIAL

## 2022-02-21 DIAGNOSIS — N13.30 HYDRONEPHROSIS OF LEFT KIDNEY: ICD-10-CM

## 2022-02-21 LAB
ANION GAP SERPL CALC-SCNC: 6 MMOL/L (ref 7–16)
BUN SERPL-MCNC: 15 MG/DL (ref 6–23)
CALCIUM SERPL-MCNC: 9.3 MG/DL (ref 8.3–10.4)
CHLORIDE SERPL-SCNC: 101 MMOL/L (ref 98–107)
CO2 SERPL-SCNC: 30 MMOL/L (ref 21–32)
CREAT SERPL-MCNC: 1.2 MG/DL (ref 0.6–1)
GLUCOSE SERPL-MCNC: 112 MG/DL (ref 65–100)
POTASSIUM SERPL-SCNC: 3.7 MMOL/L (ref 3.5–5.1)
SODIUM SERPL-SCNC: 137 MMOL/L (ref 136–145)

## 2022-02-21 PROCEDURE — 80048 BASIC METABOLIC PNL TOTAL CA: CPT

## 2022-02-21 PROCEDURE — 36415 COLL VENOUS BLD VENIPUNCTURE: CPT

## 2022-02-22 ENCOUNTER — PATIENT OUTREACH (OUTPATIENT)
Dept: CASE MANAGEMENT | Age: 47
End: 2022-02-22

## 2022-02-22 NOTE — PROGRESS NOTES
2/22/22 pt called to ask about diet restrictions post op. Sharlene Nelson is going to email pt specific diet instructions for Dr. Roberth Bentley post op pts. I checked on Caris testing, specimen not received yet. I called our pathology department and specimen was sent yesterday. I explained to the pt that we are still waiting for NGS testing to be completed to discuss treatment options. She also thinks she needs to see GI again for esophageal dilation. Instructed her to call GI for appt. Navigation will continue to follow.

## 2022-02-25 ENCOUNTER — TELEPHONE (OUTPATIENT)
Dept: CASE MANAGEMENT | Age: 47
End: 2022-02-25

## 2022-02-25 NOTE — TELEPHONE ENCOUNTER
Patient called yesterday and left voicemail in reference to requesting to advance diet. She reports 10 pound weight loss. Spoke to Dr Sandra Duncan- she is okay to proceed with a modified full liquid diet with no caloric restrictions so encouraged high calorie full liquids. She verified she did receive the email from formerly [facility] with recommendations. She denied abdominal pain and we reviewed signs of bowel obstruction. She stated understanding. Reminded her of next Friday appointments with Dr Benjamin Pablo and Dr Nan Hernández. See is aware of Dr Valeria Dunne appointment on 3/9/22.
,

## 2022-02-28 ENCOUNTER — HOSPITAL ENCOUNTER (OUTPATIENT)
Dept: INTERVENTIONAL RADIOLOGY/VASCULAR | Age: 47
Discharge: HOME | End: 2022-02-28
Attending: INTERNAL MEDICINE
Payer: COMMERCIAL

## 2022-02-28 ENCOUNTER — ANESTHESIA (OUTPATIENT)
Dept: INTERVENTIONAL RADIOLOGY/VASCULAR | Age: 47
End: 2022-02-28
Payer: COMMERCIAL

## 2022-02-28 ENCOUNTER — ANESTHESIA EVENT (OUTPATIENT)
Dept: INTERVENTIONAL RADIOLOGY/VASCULAR | Age: 47
End: 2022-02-28
Payer: COMMERCIAL

## 2022-02-28 VITALS
DIASTOLIC BLOOD PRESSURE: 68 MMHG | TEMPERATURE: 97.3 F | HEART RATE: 56 BPM | BODY MASS INDEX: 21.85 KG/M2 | HEIGHT: 64 IN | WEIGHT: 128 LBS | RESPIRATION RATE: 16 BRPM | OXYGEN SATURATION: 100 % | SYSTOLIC BLOOD PRESSURE: 121 MMHG

## 2022-02-28 DIAGNOSIS — C80.1 CARCINOMA OF UNKNOWN PRIMARY (HCC): ICD-10-CM

## 2022-02-28 PROCEDURE — 74011000250 HC RX REV CODE- 250: Performed by: ANESTHESIOLOGY

## 2022-02-28 PROCEDURE — C1788 PORT, INDWELLING, IMP: HCPCS

## 2022-02-28 PROCEDURE — 74011250636 HC RX REV CODE- 250/636: Performed by: PHYSICIAN ASSISTANT

## 2022-02-28 PROCEDURE — 77030010507 HC ADH SKN DERMBND J&J -B

## 2022-02-28 PROCEDURE — 36561 INSERT TUNNELED CV CATH: CPT

## 2022-02-28 PROCEDURE — 74011250636 HC RX REV CODE- 250/636: Performed by: NURSE ANESTHETIST, CERTIFIED REGISTERED

## 2022-02-28 PROCEDURE — 77030031139 HC SUT VCRL2 J&J -A

## 2022-02-28 PROCEDURE — C1894 INTRO/SHEATH, NON-LASER: HCPCS

## 2022-02-28 PROCEDURE — 74011250636 HC RX REV CODE- 250/636: Performed by: ANESTHESIOLOGY

## 2022-02-28 PROCEDURE — 76060000032 HC ANESTHESIA 0.5 TO 1 HR

## 2022-02-28 PROCEDURE — 74011000250 HC RX REV CODE- 250: Performed by: PHYSICIAN ASSISTANT

## 2022-02-28 PROCEDURE — 77030031131 HC SUT MXN P COVD -B

## 2022-02-28 RX ORDER — LIDOCAINE HYDROCHLORIDE 20 MG/ML
INJECTION, SOLUTION EPIDURAL; INFILTRATION; INTRACAUDAL; PERINEURAL AS NEEDED
Status: DISCONTINUED | OUTPATIENT
Start: 2022-02-28 | End: 2022-02-28 | Stop reason: HOSPADM

## 2022-02-28 RX ORDER — HEPARIN SODIUM (PORCINE) LOCK FLUSH IV SOLN 100 UNIT/ML 100 UNIT/ML
500 SOLUTION INTRAVENOUS ONCE
Status: DISCONTINUED | OUTPATIENT
Start: 2022-02-28 | End: 2022-02-28

## 2022-02-28 RX ORDER — LIDOCAINE HYDROCHLORIDE AND EPINEPHRINE 10; 10 MG/ML; UG/ML
1-20 INJECTION, SOLUTION INFILTRATION; PERINEURAL
Status: DISCONTINUED | OUTPATIENT
Start: 2022-02-28 | End: 2022-02-28

## 2022-02-28 RX ORDER — PROPOFOL 10 MG/ML
INJECTION, EMULSION INTRAVENOUS AS NEEDED
Status: DISCONTINUED | OUTPATIENT
Start: 2022-02-28 | End: 2022-02-28 | Stop reason: HOSPADM

## 2022-02-28 RX ORDER — CEFAZOLIN SODIUM/WATER 2 G/20 ML
2 SYRINGE (ML) INTRAVENOUS ONCE
Status: COMPLETED | OUTPATIENT
Start: 2022-02-28 | End: 2022-02-28

## 2022-02-28 RX ORDER — PROPOFOL 10 MG/ML
INJECTION, EMULSION INTRAVENOUS
Status: DISCONTINUED | OUTPATIENT
Start: 2022-02-28 | End: 2022-02-28 | Stop reason: HOSPADM

## 2022-02-28 RX ORDER — HEPARIN 100 UNIT/ML
500 SYRINGE INTRAVENOUS ONCE
Status: COMPLETED | OUTPATIENT
Start: 2022-02-28 | End: 2022-02-28

## 2022-02-28 RX ORDER — SODIUM CHLORIDE, SODIUM LACTATE, POTASSIUM CHLORIDE, CALCIUM CHLORIDE 600; 310; 30; 20 MG/100ML; MG/100ML; MG/100ML; MG/100ML
INJECTION, SOLUTION INTRAVENOUS
Status: DISCONTINUED | OUTPATIENT
Start: 2022-02-28 | End: 2022-02-28 | Stop reason: HOSPADM

## 2022-02-28 RX ADMIN — PROPOFOL 30 MG: 10 INJECTION, EMULSION INTRAVENOUS at 09:17

## 2022-02-28 RX ADMIN — PROPOFOL 160 MCG/KG/MIN: 10 INJECTION, EMULSION INTRAVENOUS at 09:17

## 2022-02-28 RX ADMIN — LIDOCAINE HYDROCHLORIDE 40 MG: 20 INJECTION, SOLUTION EPIDURAL; INFILTRATION; INTRACAUDAL; PERINEURAL at 09:17

## 2022-02-28 RX ADMIN — LIDOCAINE HYDROCHLORIDE,EPINEPHRINE BITARTRATE 100 MG: 10; .01 INJECTION, SOLUTION INFILTRATION; PERINEURAL at 09:36

## 2022-02-28 RX ADMIN — Medication 500 UNITS: at 09:43

## 2022-02-28 RX ADMIN — SODIUM CHLORIDE, SODIUM LACTATE, POTASSIUM CHLORIDE, AND CALCIUM CHLORIDE: 600; 310; 30; 20 INJECTION, SOLUTION INTRAVENOUS at 09:12

## 2022-02-28 RX ADMIN — PROPOFOL 20 MG: 10 INJECTION, EMULSION INTRAVENOUS at 09:19

## 2022-02-28 RX ADMIN — Medication 2 G: at 09:18

## 2022-02-28 RX ADMIN — PROPOFOL 20 MG: 10 INJECTION, EMULSION INTRAVENOUS at 09:32

## 2022-02-28 RX ADMIN — PROPOFOL 20 MG: 10 INJECTION, EMULSION INTRAVENOUS at 09:20

## 2022-02-28 NOTE — PROGRESS NOTES
TRANSFER - OUT REPORT:    Verbal report given to Ezequiel Pineda on [employer]  being transferred to IR room 6 for routine post - op       Report consisted of patients Situation, Background, Assessment and   Recommendations(SBAR). Information from the following report(s) SBAR and Procedure Summary was reviewed with the receiving nurse. Opportunity for questions and clarification was provided. MAC sedation per anesthesia  Pt tolerated procedure well.      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 Pete Lao, nl (see media note), R 2028    Hypotension     asymptomatic    Kidney stone 01/23/2022    Obstruction of fallopian tube     per pt has \"1 good tube\"    Weight loss     80lbs weight loss after gastric sleeve     Saline Lock 02/28/22 Anterior;Left;Proximal Forearm (Active)              Venous Access Device port 02/28/22 Upper chest (subclavicular area, right (Active)

## 2022-02-28 NOTE — PROGRESS NOTES
Patient in IR Suite 1 for procedure. Anesthesia has assumed care of patient for the duration of procedure. Please see anesthesia record for documentation.     IR Nurse Pre-Procedure Checklist Part 2          Consent signed: Yes    H&P complete:  Yes    Antibiotics: Yes    Airway/Mallampati Done: Yes    Shaved: Not applicable    Pregnancy Form:Yes    Patient Position: Yes    MD Side: Yes     Biopsy Worksheet: Not applicable    Specimen Medium: Not applicable

## 2022-02-28 NOTE — DISCHARGE INSTRUCTIONS
[address]  Department of Interventional Radiology  [facility]  [phone] Office  [phone] Fax  Implanted Port Discharge Instructions      General Instructions:   A port is like an implanted IV. They are usually ordered for patients who will be getting chemotherapy, but can also be used as an IV for long term antibiotics, large amounts of fluids, and/or blood products. Your blood can be drawn from your port for labs also. Those patients who do not have good veins find the ports convenient as they can get the IV they need with one stick. The port can be used long term, and the care is easy. The device is under the skin, and once the skin heals, care is minimal. All that is required is the nurse who accesses the port will need to flush it with heparinized saline after each use. Ports are usually placed in the chest wall, usually on the right side. But they can be place in the arms and in the abdomen. Home Care Instructions: If your port is in your arm, do not allow blood pressure or other IVs to be place in that arm. Do not allow bra straps or any clothing to rub the skin over the port. Do not bathe or swim until the skin has healed and if the port is accessed. Once it is healed, and when the port is not accessed, it is okay to bathe and swim. Restrict yourself to light activity for the first 5 days after getting the port put in, after that, resume normal activity slowly. You may resume your normal diet and medications. Follow-Up Instructions: Please see your oncologist, or whatever physician ordered the port as he/she has requested of you. Call If: You should call your Physician and/or the Radiology Nurse if you notice redness, pus, swelling, or pain from the area of your incision. Call if you should develop a fever. The nurses who access your port will know to call your doctor if the port does not seem to be working properly. You need to tell the nurses who use the port if you should have any pain or swelling at the site during an infusion. To Reach Us: If you have any questions about your procedure, please call the Interventional Radiology department at [phone]. After business hours (5pm) and weekends, call the answering service at [phone] and ask for the Radiologist on call to be paged. Si tiene Preguntas acerca del procedimiento, por favor llame al departamento de Radiología Intervencional al [phone]. Después de horas de oficina (5 pm) y los fines de [place], llamar al Phylicia López al [phone] y pregunte por el Radiologo de [facility] Gallo. Interventional Radiology General Nurse Discharge    After general anesthesia or intravenous sedation, for 24 hours or while taking prescription Narcotics:  · Limit your activities  · Do not drive and operate hazardous machinery  · Do not make important personal or business decisions  · Do  not drink alcoholic beverages  · If you have not urinated within 8 hours after discharge, please contact your surgeon on call. * Please give a list of your current medications to your Primary Care Provider. * Please update this list whenever your medications are discontinued, doses are     changed, or new medications (including over-the-counter products) are added. * Please carry medication information at all times in case of emergency situations. These are general instructions for a healthy lifestyle:    No smoking/ No tobacco products/ Avoid exposure to second hand smoke  Surgeon General's Warning:  Quitting smoking now greatly reduces serious risk to your health.     Obesity, smoking, and sedentary lifestyle greatly increases your risk for illness  A healthy diet, regular physical exercise & weight monitoring are important for maintaining a healthy lifestyle    You may be retaining fluid if you have a history of heart failure or if you experience any of the following symptoms:  Weight gain of 3 pounds or more overnight or 5 pounds in a week, increased swelling in our hands or feet or shortness of breath while lying flat in bed. Please call your doctor as soon as you notice any of these symptoms; do not wait until your next office visit. Recognize signs and symptoms of STROKE:  F-face looks uneven    A-arms unable to move or move unevenly    S-speech slurred or non-existent    T-time-call 911 as soon as signs and symptoms begin-DO NOT go       Back to bed or wait to see if you get better-TIME IS BRAIN.       Patient Signature:  Date: 2/28/2022  Discharging Nurse: Ivana Holly RN

## 2022-02-28 NOTE — ANESTHESIA PREPROCEDURE EVALUATION
Anesthetic History   No history of anesthetic complications            Review of Systems / Medical History  Patient summary reviewed and pertinent labs reviewed    Pulmonary  Within defined limits                 Neuro/Psych   Within defined limits           Cardiovascular  Within defined limits                Exercise tolerance: >4 METS     GI/Hepatic/Renal     GERD: well controlled    Renal disease (Creat 1.7. Obstructive.  Right Hydronephrosis.): CRI       Endo/Other        Cancer (Ovarian Cancer) and anemia (Hgb 10.8)     Other Findings              Physical Exam    Airway  Mallampati: II  TM Distance: > 6 cm  Neck ROM: normal range of motion   Mouth opening: Normal     Cardiovascular  Regular rate and rhythm,  S1 and S2 normal,  no murmur, click, rub, or gallop  Rhythm: regular  Rate: normal         Dental  No notable dental hx       Pulmonary  Breath sounds clear to auscultation               Abdominal  GI exam deferred       Other Findings            Anesthetic Plan    ASA: 2  Anesthesia type: total IV anesthesia      Post-op pain plan if not by surgeon: peripheral nerve block single    Induction: Intravenous  Anesthetic plan and risks discussed with: Patient

## 2022-02-28 NOTE — PROCEDURES
Department of Interventional Radiology  [phone]        Interventional Radiology Brief Procedure Note    Patient: Flako Beckett MRN: [id number]  SSN: [id number]    YOB: 1975  Age: 52 y.o.   Sex: female      Date of Procedure: 2/28/2022    Pre-Procedure Diagnosis: colon cancer    Post-Procedure Diagnosis: SAME    Procedure(s): Venous Chest Port Placement    Brief Description of Procedure: as above    Performed By: Jaida Echols PA-C     Assistants: None    Anesthesia: TIVA    Estimated Blood Loss: None    Specimens:  None    Implants:  Chest Port Placement    Findings: catheter tip in right atrium     Complications: None    Recommendations: ok to use port    Follow Up: prn    Signed By: Jaida Echols PA-C     February 28, 2022

## 2022-02-28 NOTE — H&P
Department of Interventional Radiology  [phone]    History and Physical    Patient:  Bhumi Jackson MRN:  [id number]  SSN:  [id number]    YOB: 1975  Age:  52 y.o. Sex:  female      Primary Care Provider:  None  Referring Physician:  Willard Wilkinson MD    Subjective:     Chief Complaint: port    History of the Present Illness: The patient is a 52 y.o. 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 She is recovering well from her laparotomy 2 weeks ago. NPO. 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 Aristeo Dxion, nl (see media note), R     Hypotension     asymptomatic    Kidney stone 2022    Obstruction of fallopian tube     per pt has \"1 good tube\"    Weight loss     80lbs weight loss after gastric sleeve     Past Surgical History:   Procedure Laterality Date    HX  SECTION      HX ENDOSCOPY      with dilation    HX GASTRIC BYPASS  2014    gastric sleeve- [place]    HX HYSTERECTOMY      HX MYOMECTOMY  age Blaine Ades 21s\"    also \"unblocked her FT\"    HX TONSILLECTOMY      HX TOTAL LAPAROSCOPIC HYSTERECTOMY  2020    TLH w/ Bilateral salpingectomy and left oophorectomy        Review of Systems:    Pertinent items are noted in the History of Present Illness. Prior to Admission medications    Medication Sig Start Date End Date Taking? Authorizing Provider   acetaminophen (TylenoL) 325 mg tablet Take 1,000 mg by mouth every six (6) hours as needed for Pain.    Yes Provider, Historical   pantoprazole (PROTONIX) 40 mg tablet Take 40 mg by mouth two (2) times a day. Yes Provider, Historical   ergocalciferol (ERGOCALCIFEROL) 1,250 mcg (50,000 unit) capsule Take 1 Capsule by mouth every seven (7) days. 10/19/21  Yes Erin Askew MD   valACYclovir (VALTREX) 500 mg tablet Take 1 Tablet by mouth daily. 9/1/21  Yes Tasia Diamond MD   senna-docusate (PERICOLACE) 8.6-50 mg per tablet Take 2 Tablets by mouth daily. Patient not taking: Reported on 2/28/2022 2/17/22   Letitia Mcmullen MD   ondansetron [facility] ODT) 4 mg disintegrating tablet Take 1 Tablet by mouth every eight (8) hours as needed for Nausea.   Patient not taking: Reported on 2/4/2022 2/1/22   Letitia Mcmullen MD        No Known Allergies    Family History   Problem Relation Age of Onset    Heart Disease Maternal Grandmother     Hypertension Maternal Grandmother     Heart Disease Maternal Grandfather     Hypertension Maternal Grandfather     Breast Cancer Neg Hx     Colon Cancer Neg Hx     Deep Vein Thrombosis Neg Hx     Ovarian Cancer Neg Hx     Prostate Cancer Neg Hx     Pulmonary Embolism Neg Hx      Social History     Tobacco Use    Smoking status: Never Smoker    Smokeless tobacco: Never Used   Substance Use Topics    Alcohol use: Not Currently     Comment: rare- 2 drinks/month        Objective:       Physical Examination:    Vitals:    02/28/22 0733 02/28/22 0736   BP: (!) 145/72    Pulse: 68    Resp: 16    Temp: 98 °F (36.7 °C)    SpO2: 100%    Weight:  58.1 kg (128 lb)   Height:  5' 4\" (1.626 m)       Pain Assessment  Pain Intensity 1: 0 (02/28/22 0736)        Patient Stated Pain Goal: 0      HEART: regular rate and rhythm  LUNG: clear to auscultation bilaterally  ABDOMEN: soft  EXTREMITIES: warm, no edema    Laboratory:     Lab Results   Component Value Date/Time    Sodium 137 02/21/2022 12:10 PM    Sodium 139 02/15/2022 11:54 PM    Potassium 3.7 02/21/2022 12:10 PM    Potassium 5.3 (H) 02/15/2022 11:54 PM    Chloride 101 02/21/2022 12:10 PM    Chloride 108 (H) 02/15/2022 11:54 PM CO2 30 02/21/2022 12:10 PM    CO2 29 02/15/2022 11:54 PM    Anion gap 6 (L) 02/21/2022 12:10 PM    Anion gap 2 (L) 02/15/2022 11:54 PM    Glucose 112 (H) 02/21/2022 12:10 PM    Glucose 117 (H) 02/15/2022 11:54 PM    BUN 15 02/21/2022 12:10 PM    BUN 11 02/15/2022 11:54 PM    Creatinine 1.20 (H) 02/21/2022 12:10 PM    Creatinine 1.40 (H) 02/15/2022 11:54 PM    GFR est AA >60 02/21/2022 12:10 PM    GFR est AA 52 (L) 02/15/2022 11:54 PM    GFR est non-AA 51 (L) 02/21/2022 12:10 PM    GFR est non-AA 43 (L) 02/15/2022 11:54 PM    Calcium 9.3 02/21/2022 12:10 PM    Calcium 8.8 02/15/2022 11:54 PM    Magnesium 2.2 02/09/2022 08:26 AM    Magnesium 2.0 04/26/2018 06:45 PM    Phosphorus 3.6 12/14/2021 04:25 PM    Albumin 3.4 (L) 02/09/2022 08:26 AM    Albumin 4.1 12/14/2021 04:25 PM    Protein, total 7.3 02/09/2022 08:26 AM    Protein, total 7.5 12/14/2021 04:25 PM    Globulin 3.9 (H) 02/09/2022 08:26 AM    Globulin 3.4 12/14/2021 04:25 PM    A-G Ratio 0.9 (L) 02/09/2022 08:26 AM    A-G Ratio 1.2 12/14/2021 04:25 PM    ALT (SGPT) 15 02/09/2022 08:26 AM    ALT (SGPT) 20 12/14/2021 04:25 PM     Lab Results   Component Value Date/Time    WBC 10.9 02/16/2022 05:25 AM    WBC 8.3 02/09/2022 08:26 AM    HGB 9.1 (L) 02/16/2022 05:25 AM    HGB 10.3 (L) 02/15/2022 01:59 PM    HCT 29.6 (L) 02/16/2022 05:25 AM    HCT 33.5 (L) 02/15/2022 01:59 PM    PLATELET 985 45/07/5166 05:25 AM    PLATELET 344 39/40/4390 08:26 AM     Lab Results   Component Value Date/Time    aPTT 27.2 06/26/2014 09:52 AM    Prothrombin time 14.5 04/26/2018 06:45 PM    Prothrombin time 12.7 (H) 06/26/2014 09:52 AM    INR 1.1 04/26/2018 06:45 PM    INR 1.2 06/26/2014 09:52 AM       Assessment:      Stage IV colon cancer with peritoneal disease and frozen pelvis    Hospital Problems  Date Reviewed: 2/16/2022    None          Plan:     Planned Procedure:  Port placement    Risks, benefits, and alternatives reviewed with patient and she agrees to proceed with the procedure.       Signed By: Teresa Cai PA-C     February 28, 2022

## 2022-02-28 NOTE — PROGRESS NOTES
Recovery period without difficulty. Pt alert and oriented and denies pain. Dressing is clean, dry, and intact. Reviewed discharge instructions with patient and spouse, both verbalized understanding. Pt escorted to lobby discharge area via wheelchair. Vital signs and Hazel score completed.

## 2022-02-28 NOTE — ANESTHESIA POSTPROCEDURE EVALUATION
* No procedures listed *.    total IV anesthesia    Anesthesia Post Evaluation      Multimodal analgesia: multimodal analgesia used between 6 hours prior to anesthesia start to PACU discharge  Patient location during evaluation: bedside  Patient participation: complete - patient participated  Level of consciousness: responsive to verbal stimuli  Pain management: adequate  Airway patency: patent  Anesthetic complications: no  Cardiovascular status: hemodynamically stable  Respiratory status: spontaneous ventilation  Hydration status: stable    Final Post Anesthesia Temperature Assessment:  Normothermia (36.0-37.5 degrees C)      INITIAL Post-op Vital signs: No vitals data found for the desired time range.

## 2022-03-02 ENCOUNTER — APPOINTMENT (OUTPATIENT)
Dept: GENERAL RADIOLOGY | Age: 47
End: 2022-03-02
Attending: PHYSICIAN ASSISTANT
Payer: COMMERCIAL

## 2022-03-02 ENCOUNTER — APPOINTMENT (OUTPATIENT)
Dept: CT IMAGING | Age: 47
End: 2022-03-02
Attending: PHYSICIAN ASSISTANT
Payer: COMMERCIAL

## 2022-03-02 ENCOUNTER — APPOINTMENT (OUTPATIENT)
Dept: ULTRASOUND IMAGING | Age: 47
End: 2022-03-02
Attending: PHYSICIAN ASSISTANT
Payer: COMMERCIAL

## 2022-03-02 ENCOUNTER — HOSPITAL ENCOUNTER (EMERGENCY)
Age: 47
Discharge: HOME | End: 2022-03-02
Attending: EMERGENCY MEDICINE
Payer: COMMERCIAL

## 2022-03-02 VITALS
RESPIRATION RATE: 18 BRPM | BODY MASS INDEX: 20.49 KG/M2 | TEMPERATURE: 98.1 F | SYSTOLIC BLOOD PRESSURE: 129 MMHG | DIASTOLIC BLOOD PRESSURE: 59 MMHG | HEIGHT: 64 IN | OXYGEN SATURATION: 100 % | HEART RATE: 51 BPM | WEIGHT: 120 LBS

## 2022-03-02 DIAGNOSIS — R10.13 ABDOMINAL PAIN, EPIGASTRIC: Primary | ICD-10-CM

## 2022-03-02 LAB
ALBUMIN SERPL-MCNC: 3.5 G/DL (ref 3.5–5)
ALBUMIN/GLOB SERPL: 0.8 {RATIO} (ref 1.2–3.5)
ALP SERPL-CCNC: 81 U/L (ref 50–130)
ALT SERPL-CCNC: 15 U/L (ref 12–65)
ANION GAP SERPL CALC-SCNC: 9 MMOL/L (ref 7–16)
APPEARANCE UR: CLEAR
AST SERPL-CCNC: 20 U/L (ref 15–37)
ATRIAL RATE: 54 BPM
BACTERIA URNS QL MICRO: 0 /HPF
BASOPHILS # BLD: 0 K/UL (ref 0–0.2)
BASOPHILS NFR BLD: 0 % (ref 0–2)
BILIRUB SERPL-MCNC: 0.4 MG/DL (ref 0.2–1.1)
BILIRUB UR QL: NEGATIVE
BUN SERPL-MCNC: 12 MG/DL (ref 6–23)
CALCIUM SERPL-MCNC: 9.4 MG/DL (ref 8.3–10.4)
CALCULATED P AXIS, ECG09: 72 DEGREES
CALCULATED R AXIS, ECG10: 73 DEGREES
CALCULATED T AXIS, ECG11: 67 DEGREES
CASTS URNS QL MICRO: 0 /LPF
CHLORIDE SERPL-SCNC: 103 MMOL/L (ref 98–107)
CO2 SERPL-SCNC: 27 MMOL/L (ref 21–32)
COLOR UR: YELLOW
CREAT SERPL-MCNC: 0.94 MG/DL (ref 0.6–1)
DIAGNOSIS, 93000: NORMAL
DIFFERENTIAL METHOD BLD: ABNORMAL
EOSINOPHIL # BLD: 0.1 K/UL (ref 0–0.8)
EOSINOPHIL NFR BLD: 1 % (ref 0.5–7.8)
EPI CELLS #/AREA URNS HPF: ABNORMAL /HPF
ERYTHROCYTE [DISTWIDTH] IN BLOOD BY AUTOMATED COUNT: 12.9 % (ref 11.9–14.6)
GLOBULIN SER CALC-MCNC: 4.2 G/DL (ref 2.3–3.5)
GLUCOSE SERPL-MCNC: 110 MG/DL (ref 65–100)
GLUCOSE UR STRIP.AUTO-MCNC: NEGATIVE MG/DL
HCT VFR BLD AUTO: 38.8 % (ref 35.8–46.3)
HGB BLD-MCNC: 11.6 G/DL (ref 11.7–15.4)
HGB UR QL STRIP: ABNORMAL
IMM GRANULOCYTES # BLD AUTO: 0 K/UL (ref 0–0.5)
IMM GRANULOCYTES NFR BLD AUTO: 0 % (ref 0–5)
KETONES UR QL STRIP.AUTO: 15 MG/DL
LEUKOCYTE ESTERASE UR QL STRIP.AUTO: ABNORMAL
LIPASE SERPL-CCNC: 347 U/L (ref 73–393)
LYMPHOCYTES # BLD: 0.9 K/UL (ref 0.5–4.6)
LYMPHOCYTES NFR BLD: 11 % (ref 13–44)
MCH RBC QN AUTO: 27.4 PG (ref 26.1–32.9)
MCHC RBC AUTO-ENTMCNC: 29.9 G/DL (ref 31.4–35)
MCV RBC AUTO: 91.5 FL (ref 79.6–97.8)
MONOCYTES # BLD: 0.5 K/UL (ref 0.1–1.3)
MONOCYTES NFR BLD: 6 % (ref 4–12)
NEUTS SEG # BLD: 7.1 K/UL (ref 1.7–8.2)
NEUTS SEG NFR BLD: 82 % (ref 43–78)
NITRITE UR QL STRIP.AUTO: NEGATIVE
NRBC # BLD: 0 K/UL (ref 0–0.2)
P-R INTERVAL, ECG05: 138 MS
PH UR STRIP: 5 [PH] (ref 5–9)
PLATELET # BLD AUTO: 204 K/UL (ref 150–450)
PMV BLD AUTO: 10.8 FL (ref 9.4–12.3)
POTASSIUM SERPL-SCNC: 4.4 MMOL/L (ref 3.5–5.1)
PROT SERPL-MCNC: 7.7 G/DL (ref 6.3–8.2)
PROT UR STRIP-MCNC: 30 MG/DL
Q-T INTERVAL, ECG07: 432 MS
QRS DURATION, ECG06: 72 MS
QTC CALCULATION (BEZET), ECG08: 409 MS
RBC # BLD AUTO: 4.24 M/UL (ref 4.05–5.2)
RBC #/AREA URNS HPF: ABNORMAL /HPF
SODIUM SERPL-SCNC: 139 MMOL/L (ref 136–145)
SP GR UR REFRACTOMETRY: 1.05 (ref 1–1.02)
UROBILINOGEN UR QL STRIP.AUTO: 0.2 EU/DL (ref 0.2–1)
VENTRICULAR RATE, ECG03: 54 BPM
WBC # BLD AUTO: 8.6 K/UL (ref 4.3–11.1)
WBC URNS QL MICRO: ABNORMAL /HPF

## 2022-03-02 PROCEDURE — 74011000636 HC RX REV CODE- 636: Performed by: PHYSICIAN ASSISTANT

## 2022-03-02 PROCEDURE — 74011250637 HC RX REV CODE- 250/637: Performed by: PHYSICIAN ASSISTANT

## 2022-03-02 PROCEDURE — 83690 ASSAY OF LIPASE: CPT

## 2022-03-02 PROCEDURE — 96374 THER/PROPH/DIAG INJ IV PUSH: CPT

## 2022-03-02 PROCEDURE — 99285 EMERGENCY DEPT VISIT HI MDM: CPT

## 2022-03-02 PROCEDURE — 96372 THER/PROPH/DIAG INJ SC/IM: CPT

## 2022-03-02 PROCEDURE — 74177 CT ABD & PELVIS W/CONTRAST: CPT

## 2022-03-02 PROCEDURE — 74022 RADEX COMPL AQT ABD SERIES: CPT

## 2022-03-02 PROCEDURE — 96361 HYDRATE IV INFUSION ADD-ON: CPT

## 2022-03-02 PROCEDURE — 96375 TX/PRO/DX INJ NEW DRUG ADDON: CPT

## 2022-03-02 PROCEDURE — 85025 COMPLETE CBC W/AUTO DIFF WBC: CPT

## 2022-03-02 PROCEDURE — 81001 URINALYSIS AUTO W/SCOPE: CPT

## 2022-03-02 PROCEDURE — 74011000636 HC RX REV CODE- 636: Performed by: EMERGENCY MEDICINE

## 2022-03-02 PROCEDURE — 76705 ECHO EXAM OF ABDOMEN: CPT

## 2022-03-02 PROCEDURE — 80053 COMPREHEN METABOLIC PANEL: CPT

## 2022-03-02 PROCEDURE — 74011000258 HC RX REV CODE- 258: Performed by: EMERGENCY MEDICINE

## 2022-03-02 PROCEDURE — 93005 ELECTROCARDIOGRAM TRACING: CPT | Performed by: EMERGENCY MEDICINE

## 2022-03-02 PROCEDURE — 74011250636 HC RX REV CODE- 250/636: Performed by: PHYSICIAN ASSISTANT

## 2022-03-02 RX ORDER — ONDANSETRON 4 MG/1
4 TABLET, ORALLY DISINTEGRATING ORAL
Status: COMPLETED | OUTPATIENT
Start: 2022-03-02 | End: 2022-03-02

## 2022-03-02 RX ORDER — ONDANSETRON 8 MG/1
8 TABLET, ORALLY DISINTEGRATING ORAL
Qty: 15 TABLET | Refills: 0 | Status: SHIPPED | OUTPATIENT
Start: 2022-03-02 | End: 2022-03-04

## 2022-03-02 RX ORDER — MORPHINE SULFATE 4 MG/ML
4 INJECTION INTRAVENOUS
Status: COMPLETED | OUTPATIENT
Start: 2022-03-02 | End: 2022-03-02

## 2022-03-02 RX ORDER — HYDROCODONE BITARTRATE AND ACETAMINOPHEN 5; 325 MG/1; MG/1
1 TABLET ORAL
Qty: 10 TABLET | Refills: 0 | Status: SHIPPED | OUTPATIENT
Start: 2022-03-02 | End: 2022-03-04

## 2022-03-02 RX ORDER — SODIUM CHLORIDE 0.9 % (FLUSH) 0.9 %
10 SYRINGE (ML) INJECTION
Status: COMPLETED | OUTPATIENT
Start: 2022-03-02 | End: 2022-03-02

## 2022-03-02 RX ORDER — SODIUM CHLORIDE 0.9 % (FLUSH) 0.9 %
5-10 SYRINGE (ML) INJECTION EVERY 8 HOURS
Status: DISCONTINUED | OUTPATIENT
Start: 2022-03-02 | End: 2022-03-02 | Stop reason: HOSPADM

## 2022-03-02 RX ORDER — TRAMADOL HYDROCHLORIDE 50 MG/1
50 TABLET ORAL
Status: DISCONTINUED | OUTPATIENT
Start: 2022-03-02 | End: 2022-03-02 | Stop reason: CLARIF

## 2022-03-02 RX ORDER — MORPHINE SULFATE 2 MG/ML
2 INJECTION, SOLUTION INTRAMUSCULAR; INTRAVENOUS
Status: COMPLETED | OUTPATIENT
Start: 2022-03-02 | End: 2022-03-02

## 2022-03-02 RX ORDER — ONDANSETRON 2 MG/ML
4 INJECTION INTRAMUSCULAR; INTRAVENOUS
Status: COMPLETED | OUTPATIENT
Start: 2022-03-02 | End: 2022-03-02

## 2022-03-02 RX ORDER — SODIUM CHLORIDE 0.9 % (FLUSH) 0.9 %
5-10 SYRINGE (ML) INJECTION AS NEEDED
Status: DISCONTINUED | OUTPATIENT
Start: 2022-03-02 | End: 2022-03-02 | Stop reason: HOSPADM

## 2022-03-02 RX ADMIN — MORPHINE SULFATE 4 MG: 4 INJECTION INTRAVENOUS at 11:12

## 2022-03-02 RX ADMIN — ONDANSETRON 4 MG: 2 INJECTION INTRAMUSCULAR; INTRAVENOUS at 13:26

## 2022-03-02 RX ADMIN — DIATRIZOATE MEGLUMINE AND DIATRIZOATE SODIUM 15 ML: 660; 100 LIQUID ORAL; RECTAL at 11:51

## 2022-03-02 RX ADMIN — Medication 10 ML: at 14:14

## 2022-03-02 RX ADMIN — ONDANSETRON 4 MG: 4 TABLET, ORALLY DISINTEGRATING ORAL at 11:12

## 2022-03-02 RX ADMIN — MORPHINE SULFATE 2 MG: 2 INJECTION, SOLUTION INTRAMUSCULAR; INTRAVENOUS at 13:56

## 2022-03-02 RX ADMIN — IOPAMIDOL 100 ML: 755 INJECTION, SOLUTION INTRAVENOUS at 14:13

## 2022-03-02 RX ADMIN — SODIUM CHLORIDE 100 ML: 9 INJECTION, SOLUTION INTRAVENOUS at 14:14

## 2022-03-02 NOTE — ED NOTES
I have reviewed discharge instructions with the patient. The patient verbalized understanding. Patient left ED via Discharge Method: ambulatory to Home with spouse. Opportunity for questions and clarification provided. Patient given 1 scripts. To continue your aftercare when you leave the hospital, you may receive an automated call from our care team to check in on how you are doing. This is a free service and part of our promise to provide the best care and service to meet your aftercare needs.  If you have questions, or wish to unsubscribe from this service please call [phone]. Thank you for Choosing our [facility] Emergency Department.

## 2022-03-02 NOTE — ED PROVIDER NOTES
Patient is a 42-year-old female with a past medical history of metastatic colon cancer who presents via EMS for the complaint of severe sharp upper abdominal pain. She states that she had an EGD today for esophageal stretching and after she woke up in recovery she was experiencing sharp pain in the epigastric area that she rates as a 9.5 out of 10. She states pain is constant and sharp. She was sent over via EMS and did have one episode of emesis but denies any current nausea. EMS emesis was clear spit like with small streaking of blood, per GI this can be normal following surgery. Patient denies any pain in her back. She denies any fever or chills. Additionally patient had explorative laparotomy 2 weeks ago to determine source of her cancer. The history is provided by the patient. Abdominal Pain  Associated symptoms include abdominal pain (epigastric). Pertinent negatives include no chest pain and no shortness of breath.         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 Kyler Sotomayor, nl (see media note), R     Hypotension     asymptomatic    Kidney stone 2022    Obstruction of fallopian tube     per pt has \"1 good tube\"    Weight loss     80lbs weight loss after gastric sleeve       Past Surgical History:   Procedure Laterality Date    HX  SECTION      HX ENDOSCOPY      with dilation    HX GASTRIC BYPASS  2014    gastric sleeve- [facility]    HX HYSTERECTOMY      HX MYOMECTOMY  age \"early 21s\"    also \"unblocked her FT\"    HX TONSILLECTOMY      HX TOTAL LAPAROSCOPIC HYSTERECTOMY  2020    TLH w/ Bilateral salpingectomy and left oophorectomy    IR INSERT MARYL CVC W PORT OVER 5 YEARS  2/28/2022         Family History:   Problem Relation Age of Onset    Heart Disease Maternal Grandmother     Hypertension Maternal Grandmother     Heart Disease Maternal Grandfather     Hypertension Maternal Grandfather     Breast Cancer Neg Hx     Colon Cancer Neg Hx     Deep Vein Thrombosis Neg Hx     Ovarian Cancer Neg Hx     Prostate Cancer Neg Hx     Pulmonary Embolism Neg Hx        Social History     Socioeconomic History    Marital status: SINGLE     Spouse name: Not on file    Number of children: Not on file    Years of education: Not on file    Highest education level: Not on file   Occupational History    Occupation: planner   Tobacco Use    Smoking status: Never Smoker    Smokeless tobacco: Never Used   Vaping Use    Vaping Use: Never used   Substance and Sexual Activity    Alcohol use: Not Currently     Comment: rare- 2 drinks/month    Drug use: No    Sexual activity: Yes     Partners: Male     Birth control/protection: None   Other Topics Concern    Not on file   Social History Narrative    1. Use large speculum. 2.  sister, Yusef Cramer #[id number] and mom is Zora Rees #[id number] (both Kofoed's pts)    3. PCP  Dr. Ronn Musa ([facility]), GI Dr. Prince tito Holley-2018 normal EGD     Social Determinants of Health     Financial Resource Strain:     Difficulty of Paying Living Expenses: Not on file   Food Insecurity:     Worried About Running Out of Food in the Last Year: Not on file    Caterina of Food in the Last Year: Not on file   Transportation Needs:     Lack of Transportation (Medical): Not on file    Lack of Transportation (Non-Medical):  Not on file   Physical Activity:     Days of Exercise per Week: Not on file    Minutes of Exercise per Session: Not on file   Stress:     Feeling of Stress : Not on file   Social Connections:     Frequency of Communication with Friends and Family: Not on file    Frequency of Social Gatherings with Friends and Family: Not on file    Attends Congregational Services: Not on file    Active Member of Clubs or Organizations: Not on file    Attends Club or Organization Meetings: Not on file    Marital Status: Not on file   Intimate Partner Violence:     Fear of Current or Ex-Partner: Not on file    Emotionally Abused: Not on file    Physically Abused: Not on file    Sexually Abused: Not on file   Housing Stability:     Unable to Pay for Housing in the Last Year: Not on file    Number of [place] in the Last Year: Not on file    Unstable Housing in the Last Year: Not on file         ALLERGIES: Patient has no known allergies. Review of Systems   Constitutional: Negative for chills, fatigue and fever. HENT: Negative for congestion and sore throat. Respiratory: Negative for cough and shortness of breath. Cardiovascular: Negative for chest pain. Gastrointestinal: Positive for abdominal distention, abdominal pain (epigastric), nausea and vomiting. Negative for constipation. Genitourinary: Negative for dysuria and flank pain. Musculoskeletal: Negative for back pain. Neurological: Negative for light-headedness. Psychiatric/Behavioral: Negative for behavioral problems. Vitals:    03/02/22 1044   BP: (!) 129/59   Pulse: (!) 51   Resp: 18   Temp: 98.1 °F (36.7 °C)   SpO2: 100%   Weight: 54.4 kg (120 lb)   Height: 5' 4\" (1.626 m)            Physical Exam  Vitals and nursing note reviewed. Constitutional:       Appearance: She is not toxic-appearing. HENT:      Head: Normocephalic and atraumatic. Cardiovascular:      Rate and Rhythm: Normal rate. Heart sounds: Normal heart sounds. Pulmonary:      Effort: Pulmonary effort is normal.   Abdominal:      General: There is distension. Palpations: Abdomen is soft. Tenderness: There is abdominal tenderness in the epigastric area. There is no guarding or rebound. Skin:     General: Skin is warm and dry.    Neurological: Mental Status: She is alert and oriented to person, place, and time. Psychiatric:         Mood and Affect: Mood normal.         Behavior: Behavior normal.          MDM  Number of Diagnoses or Management Options  Diagnosis management comments: Patient is a 49-year-old female with a history as metastatic colon cancer who presents after EGD for esophageal stretching with epigastric pain and nausea. She is afebrile, vital signs within appropriate limits, does appear to have significant pain palpation of the epigastric region, no guarding. She does have generalized pain with palpation of the lower abdomen but states that it does not feel any worse since she had her surgery 2 weeks ago. She does have history of a gastric sleeve from 2014. We will get labs including CBC, CMP and lipase, will get acute abdominal series to check for any free air. Acute abdominal series shows:   1. No acute finding in the chest.   2. Diffuse air-filled loops of small and large bowel with slight distention of   the left midabdomen small bowel in the right lower quadrant large bowel, favor   generalized ileus. Further evaluation with abdomen CT can be considered. 3. Left-sided stent, questionable double-J ureteral stent, which appears   malpositioned. Right-sided double-J ureteral stent appears appropriately   positioned. Clinical correlation is advised. We will get CT with p.o. contrast to further evaluate.     Labs Reviewed  CBC WITH AUTOMATED DIFF - Abnormal; Notable for the following components:     HGB                           11.6 (*)               MCHC                          29.9 (*)               NEUTROPHILS                   82 (*)                 LYMPHOCYTES                   11 (*)              All other components within normal limits  METABOLIC PANEL, COMPREHENSIVE - Abnormal; Notable for the following components:     Glucose                       110 (*)                Globulin                      4.2 (*) A-G Ratio                     0.8 (*)             All other components within normal limits  LIPASE    3:31 PM  Upon reevaluation, patient resting comfortably in bed states that her pain is improved and she is no longer feeling nauseated. Discussed all CT results findings with patient, will get abdominal ultrasound to further evaluate gallbladder. 4:05 PM  Pt signed out to [place], NP, who will follow up on abd ultrasound and urinalysis. ED Course as of 03/02/22 1528   Wed Mar 02, 2022   1122 ECG with sinus bradycardia. Heart rate 54. No ST elevation. No ischemic changes noted. No QT prolongation. ECG evaluated and signed by myself.     Rikki Stokes MD [DF]      ED Course User Index  [DF] Earnestine Marroquin MD       Procedures

## 2022-03-02 NOTE — ED TRIAGE NOTES
Pt arrived via EMS from outpatient GI. Pt had and EGD and esophageal stretching done this morning. In recovery pt began to complain of severe mid-abdominal pain. GI sent her to ED for further evaluation. Pt did have one episode of emesis en route with EMS, per EMS it was clear/spit like with a small amount of blood, EMS states GI told them some blood would be normal. Pt states the pain is sharp and constant.

## 2022-03-02 NOTE — DISCHARGE INSTRUCTIONS
Normal white blood cell count at 8.1, lipase at 347. Right upper quadrant ultrasound showed gallstones, sludge and mild distention, concerning for cholecystitis. However with a normal white blood cell count and no fever, unlikely a true infection. If you develop a fever or severe right upper quadrant abdominal pain, return to the ED. Call Dr. Baldemar Miller tomorrow to let them know how you are doing following your dilation and about your gallbladder findings. Use Zofran 1 tablet every 8 hours for nausea. Continue with clear liquid diet as discussed upon discharge. Use Norco 1 tablet every 4-6 hours for severe pain. Do not drive will take this medication as it will make you sleepy.

## 2022-03-02 NOTE — ED PROVIDER NOTES
UPDATE NOTE:     I received signout from Julian, [id number]. At the time of signout, urine and ultrasound was pending. Briefly, patient is a newly diagnosed metastatic colon cancer. She had an EGD today with a dilation and had severe epigastric discomfort following the procedure. She received morphine and Zofran here in the ED. US ABD LTD   Final Result   1. Examination equivocal for acute cholecystitis with mild distention of the   gallbladder with numerous calcified stones and sludge although without wall   thickening and with an absent sonographic Wolff sign. Clinical correlation is   advised with consideration of HIDA scan if clinically indicated. 2. Small volume ascites evident in the right upper quadrant. 3. Pancreas is not visualized. 4. Moderate right hydronephrosis with a nephroureteral stent evident. CT ABD PELV W CONT   Final Result   1. Moderate volume ascites. 2. Postsurgical changes in keeping with prior gastric sleeve procedure. No   findings present to suspect perforation. There is a hiatal hernia. 3. Bilateral hydronephrosis with bilateral nephroureteral stents in place. Note   the left nephroureteral stent terminates in the mid ureter. 4. Hydropic gallbladder. The gallbladder wall is thin. XR ABD ACUTE W 1 V CHEST   Final Result   1. No acute finding in the chest.   2. Diffuse air-filled loops of small and large bowel with slight distention of   the left midabdomen small bowel in the right lower quadrant large bowel, favor   generalized ileus. Further evaluation with abdomen CT can be considered. 3. Left-sided stent, questionable double-J ureteral stent, which appears   malpositioned. Right-sided double-J ureteral stent appears appropriately   positioned. Clinical correlation is advised. 6:26 PM  Right upper quadrant ultrasound does show gallstones and sludge with mild distention but no wall thickening.   It would equivocally be concerning for cholecystitis however patient's physical exam does not match this. Do not believe this is true cholecystitis given she is afebrile with a normal white count. Patient's pain upon initial presentation was midsternal, consistent with esophageal dilation. She has continued to be comfortable since her initial medications given. She is tolerating p.o. fluids without vomiting. She is established with Ezzie Pippins with GI. Will recommend she call tomorrow just to update them about how she is doing following her esophageal dilation as well as to inform them about her gallbladder findings. Patient is agreeable to this plan. She has been hemodynamically stable during her time here in the ED. We will send her home with Zofran and pain medicine. Dlilon Latisha is additionally notified about this patient and case discussed. He is in agreement with this plan. Strict return precautions given for new onset fever or severe right upper quadrant abdominal pain. Safe for discharge at this time.     Gean Yung NP  6:29 PM

## 2022-03-04 ENCOUNTER — HOSPITAL ENCOUNTER (OUTPATIENT)
Dept: LAB | Age: 47
Discharge: HOME | End: 2022-03-04
Payer: COMMERCIAL

## 2022-03-04 DIAGNOSIS — C80.1 CARCINOMA OF UNKNOWN PRIMARY (HCC): ICD-10-CM

## 2022-03-04 LAB
ALBUMIN SERPL-MCNC: 3.5 G/DL (ref 3.5–5)
ALBUMIN/GLOB SERPL: 1 {RATIO} (ref 1.2–3.5)
ALP SERPL-CCNC: 69 U/L (ref 50–136)
ALT SERPL-CCNC: 16 U/L (ref 12–65)
ANION GAP SERPL CALC-SCNC: 6 MMOL/L (ref 7–16)
APPEARANCE UR: CLEAR
AST SERPL-CCNC: 16 U/L (ref 15–37)
BACTERIA URNS QL MICRO: ABNORMAL /HPF
BASOPHILS # BLD: 0 K/UL (ref 0–0.2)
BASOPHILS NFR BLD: 0 % (ref 0–2)
BILIRUB SERPL-MCNC: 0.4 MG/DL (ref 0.2–1.1)
BILIRUB UR QL: NEGATIVE
BUN SERPL-MCNC: 12 MG/DL (ref 6–23)
CALCIUM SERPL-MCNC: 9 MG/DL (ref 8.3–10.4)
CANCER AG19-9 SERPL-ACNC: 13.4 U/ML (ref 2–37)
CASTS URNS QL MICRO: ABNORMAL /LPF
CEA SERPL-MCNC: <0.2 NG/ML (ref 0–3)
CHLORIDE SERPL-SCNC: 103 MMOL/L (ref 98–107)
CO2 SERPL-SCNC: 30 MMOL/L (ref 21–32)
COLOR UR: YELLOW
CREAT SERPL-MCNC: 0.7 MG/DL (ref 0.6–1)
CRYSTALS URNS QL MICRO: ABNORMAL /LPF
DIFFERENTIAL METHOD BLD: ABNORMAL
EOSINOPHIL # BLD: 0.1 K/UL (ref 0–0.8)
EOSINOPHIL NFR BLD: 2 % (ref 0.5–7.8)
EPI CELLS #/AREA URNS HPF: ABNORMAL /HPF
ERYTHROCYTE [DISTWIDTH] IN BLOOD BY AUTOMATED COUNT: 13.1 % (ref 11.9–14.6)
GLOBULIN SER CALC-MCNC: 3.6 G/DL (ref 2.3–3.5)
GLUCOSE SERPL-MCNC: 87 MG/DL (ref 65–100)
GLUCOSE UR STRIP.AUTO-MCNC: NEGATIVE MG/DL
HCT VFR BLD AUTO: 34.1 % (ref 35.8–46.3)
HGB BLD-MCNC: 10.4 G/DL (ref 11.7–15.4)
HGB UR QL STRIP: ABNORMAL
IMM GRANULOCYTES # BLD AUTO: 0 K/UL (ref 0–0.5)
IMM GRANULOCYTES NFR BLD AUTO: 0 % (ref 0–5)
KETONES UR QL STRIP.AUTO: 40 MG/DL
LEUKOCYTE ESTERASE UR QL STRIP.AUTO: ABNORMAL
LYMPHOCYTES # BLD: 1.2 K/UL (ref 0.5–4.6)
LYMPHOCYTES NFR BLD: 18 % (ref 13–44)
MCH RBC QN AUTO: 27.5 PG (ref 26.1–32.9)
MCHC RBC AUTO-ENTMCNC: 30.5 G/DL (ref 31.4–35)
MCV RBC AUTO: 90.2 FL (ref 79.6–97.8)
MONOCYTES # BLD: 0.6 K/UL (ref 0.1–1.3)
MONOCYTES NFR BLD: 9 % (ref 4–12)
MUCOUS THREADS URNS QL MICRO: ABNORMAL /LPF
NEUTS SEG # BLD: 4.9 K/UL (ref 1.7–8.2)
NEUTS SEG NFR BLD: 71 % (ref 43–78)
NITRITE UR QL STRIP.AUTO: NEGATIVE
NRBC # BLD: 0 K/UL (ref 0–0.2)
OTHER OBSERVATIONS,UCOM: ABNORMAL
PH UR STRIP: 5.5 [PH] (ref 5–9)
PLATELET # BLD AUTO: 224 K/UL (ref 150–450)
PMV BLD AUTO: 10.3 FL (ref 9.4–12.3)
POTASSIUM SERPL-SCNC: 3.1 MMOL/L (ref 3.5–5.1)
PROT SERPL-MCNC: 7.1 G/DL (ref 6.3–8.2)
PROT UR STRIP-MCNC: 30 MG/DL
RBC # BLD AUTO: 3.78 M/UL (ref 4.05–5.2)
RBC #/AREA URNS HPF: ABNORMAL /HPF
SODIUM SERPL-SCNC: 139 MMOL/L (ref 136–145)
SP GR UR REFRACTOMETRY: 1.01 (ref 1–1.02)
UROBILINOGEN UR QL STRIP.AUTO: 0.2 EU/DL (ref 0.2–1)
WBC # BLD AUTO: 6.9 K/UL (ref 4.3–11.1)
WBC URNS QL MICRO: ABNORMAL /HPF

## 2022-03-04 PROCEDURE — 82378 CARCINOEMBRYONIC ANTIGEN: CPT

## 2022-03-04 PROCEDURE — 87086 URINE CULTURE/COLONY COUNT: CPT

## 2022-03-04 PROCEDURE — 86301 IMMUNOASSAY TUMOR CA 19-9: CPT

## 2022-03-04 PROCEDURE — 85025 COMPLETE CBC W/AUTO DIFF WBC: CPT

## 2022-03-04 PROCEDURE — 81003 URINALYSIS AUTO W/O SCOPE: CPT

## 2022-03-04 PROCEDURE — 80053 COMPREHEN METABOLIC PANEL: CPT

## 2022-03-04 PROCEDURE — 81015 MICROSCOPIC EXAM OF URINE: CPT

## 2022-03-04 PROCEDURE — 36415 COLL VENOUS BLD VENIPUNCTURE: CPT

## 2022-03-07 LAB
BACTERIA SPEC CULT: NORMAL
SERVICE CMNT-IMP: NORMAL

## 2022-03-14 ENCOUNTER — ANESTHESIA EVENT (OUTPATIENT)
Dept: SURGERY | Age: 47
End: 2022-03-14
Payer: COMMERCIAL

## 2022-03-14 RX ORDER — FLUMAZENIL 0.1 MG/ML
0.2 INJECTION INTRAVENOUS
Status: CANCELLED | OUTPATIENT
Start: 2022-03-14

## 2022-03-14 RX ORDER — NALOXONE HYDROCHLORIDE 0.4 MG/ML
0.1 INJECTION, SOLUTION INTRAMUSCULAR; INTRAVENOUS; SUBCUTANEOUS
Status: CANCELLED | OUTPATIENT
Start: 2022-03-14

## 2022-03-14 RX ORDER — HALOPERIDOL 5 MG/ML
1 INJECTION INTRAMUSCULAR
Status: CANCELLED | OUTPATIENT
Start: 2022-03-14 | End: 2022-03-15

## 2022-03-14 RX ORDER — HYDROMORPHONE HYDROCHLORIDE 2 MG/ML
0.5 INJECTION, SOLUTION INTRAMUSCULAR; INTRAVENOUS; SUBCUTANEOUS
Status: CANCELLED | OUTPATIENT
Start: 2022-03-14

## 2022-03-14 RX ORDER — DIPHENHYDRAMINE HYDROCHLORIDE 50 MG/ML
12.5 INJECTION, SOLUTION INTRAMUSCULAR; INTRAVENOUS
Status: CANCELLED | OUTPATIENT
Start: 2022-03-14

## 2022-03-14 RX ORDER — OXYCODONE HYDROCHLORIDE 5 MG/1
5 TABLET ORAL
Status: CANCELLED | OUTPATIENT
Start: 2022-03-14 | End: 2022-03-15

## 2022-03-14 RX ORDER — SODIUM CHLORIDE, SODIUM LACTATE, POTASSIUM CHLORIDE, CALCIUM CHLORIDE 600; 310; 30; 20 MG/100ML; MG/100ML; MG/100ML; MG/100ML
75 INJECTION, SOLUTION INTRAVENOUS CONTINUOUS
Status: CANCELLED | OUTPATIENT
Start: 2022-03-14

## 2022-03-15 ENCOUNTER — ANESTHESIA (OUTPATIENT)
Dept: SURGERY | Age: 47
End: 2022-03-15
Payer: COMMERCIAL

## 2022-03-15 ENCOUNTER — HOSPITAL ENCOUNTER (OUTPATIENT)
Age: 47
Setting detail: OUTPATIENT SURGERY
Discharge: HOME | End: 2022-03-15
Attending: UROLOGY | Admitting: UROLOGY
Payer: COMMERCIAL

## 2022-03-15 VITALS
SYSTOLIC BLOOD PRESSURE: 137 MMHG | OXYGEN SATURATION: 100 % | WEIGHT: 118 LBS | HEART RATE: 60 BPM | TEMPERATURE: 98 F | HEIGHT: 64 IN | BODY MASS INDEX: 20.14 KG/M2 | RESPIRATION RATE: 20 BRPM | DIASTOLIC BLOOD PRESSURE: 80 MMHG

## 2022-03-15 DIAGNOSIS — N13.30 HYDRONEPHROSIS, UNSPECIFIED HYDRONEPHROSIS TYPE: ICD-10-CM

## 2022-03-15 LAB — HGB BLD-MCNC: 10.5 G/DL (ref 11.7–15.4)

## 2022-03-15 PROCEDURE — 77030010509 HC AIRWY LMA MSK TELE -A: Performed by: ANESTHESIOLOGY

## 2022-03-15 PROCEDURE — 76010000149 HC OR TIME 1 TO 1.5 HR: Performed by: UROLOGY

## 2022-03-15 PROCEDURE — C2617 STENT, NON-COR, TEM W/O DEL: HCPCS | Performed by: UROLOGY

## 2022-03-15 PROCEDURE — 74011000250 HC RX REV CODE- 250: Performed by: NURSE ANESTHETIST, CERTIFIED REGISTERED

## 2022-03-15 PROCEDURE — 76060000033 HC ANESTHESIA 1 TO 1.5 HR: Performed by: UROLOGY

## 2022-03-15 PROCEDURE — 85018 HEMOGLOBIN: CPT

## 2022-03-15 PROCEDURE — C1758 CATHETER, URETERAL: HCPCS | Performed by: UROLOGY

## 2022-03-15 PROCEDURE — 52351 CYSTOURETERO & OR PYELOSCOPE: CPT | Performed by: UROLOGY

## 2022-03-15 PROCEDURE — 74011000636 HC RX REV CODE- 636: Performed by: UROLOGY

## 2022-03-15 PROCEDURE — C1769 GUIDE WIRE: HCPCS | Performed by: UROLOGY

## 2022-03-15 PROCEDURE — 74011250636 HC RX REV CODE- 250/636: Performed by: NURSE ANESTHETIST, CERTIFIED REGISTERED

## 2022-03-15 PROCEDURE — 74420 UROGRAPHY RTRGR +-KUB: CPT | Performed by: UROLOGY

## 2022-03-15 PROCEDURE — 74011250636 HC RX REV CODE- 250/636: Performed by: UROLOGY

## 2022-03-15 PROCEDURE — 74011250636 HC RX REV CODE- 250/636: Performed by: ANESTHESIOLOGY

## 2022-03-15 PROCEDURE — 74011250637 HC RX REV CODE- 250/637: Performed by: ANESTHESIOLOGY

## 2022-03-15 PROCEDURE — 77030040831 HC BAG URINE DRNG MDII -A: Performed by: UROLOGY

## 2022-03-15 PROCEDURE — 52332 CYSTOSCOPY AND TREATMENT: CPT | Performed by: UROLOGY

## 2022-03-15 PROCEDURE — 76210000063 HC OR PH I REC FIRST 0.5 HR: Performed by: UROLOGY

## 2022-03-15 PROCEDURE — 2709999900 HC NON-CHARGEABLE SUPPLY: Performed by: UROLOGY

## 2022-03-15 PROCEDURE — 76210000020 HC REC RM PH II FIRST 0.5 HR: Performed by: UROLOGY

## 2022-03-15 DEVICE — URETERAL STENT
Type: IMPLANTABLE DEVICE | Site: URETER | Status: FUNCTIONAL
Brand: PERCUFLEX™ PLUS

## 2022-03-15 RX ORDER — CEFAZOLIN SODIUM/WATER 2 G/20 ML
2 SYRINGE (ML) INTRAVENOUS ONCE
Status: COMPLETED | OUTPATIENT
Start: 2022-03-15 | End: 2022-03-15

## 2022-03-15 RX ORDER — ACETAMINOPHEN 500 MG
1000 TABLET ORAL ONCE
Status: COMPLETED | OUTPATIENT
Start: 2022-03-15 | End: 2022-03-15

## 2022-03-15 RX ORDER — PROPOFOL 10 MG/ML
INJECTION, EMULSION INTRAVENOUS AS NEEDED
Status: DISCONTINUED | OUTPATIENT
Start: 2022-03-15 | End: 2022-03-15 | Stop reason: HOSPADM

## 2022-03-15 RX ORDER — LIDOCAINE HYDROCHLORIDE 20 MG/ML
INJECTION, SOLUTION EPIDURAL; INFILTRATION; INTRACAUDAL; PERINEURAL AS NEEDED
Status: DISCONTINUED | OUTPATIENT
Start: 2022-03-15 | End: 2022-03-15 | Stop reason: HOSPADM

## 2022-03-15 RX ORDER — EPHEDRINE SULFATE/0.9% NACL/PF 50 MG/5 ML
SYRINGE (ML) INTRAVENOUS AS NEEDED
Status: DISCONTINUED | OUTPATIENT
Start: 2022-03-15 | End: 2022-03-15 | Stop reason: HOSPADM

## 2022-03-15 RX ORDER — OXYBUTYNIN CHLORIDE 5 MG/1
5 TABLET ORAL
Qty: 30 TABLET | Refills: 0 | Status: SHIPPED | OUTPATIENT
Start: 2022-03-15 | End: 2022-04-28

## 2022-03-15 RX ORDER — FENTANYL CITRATE 50 UG/ML
INJECTION, SOLUTION INTRAMUSCULAR; INTRAVENOUS AS NEEDED
Status: DISCONTINUED | OUTPATIENT
Start: 2022-03-15 | End: 2022-03-15 | Stop reason: HOSPADM

## 2022-03-15 RX ORDER — LIDOCAINE HYDROCHLORIDE 10 MG/ML
0.1 INJECTION INFILTRATION; PERINEURAL AS NEEDED
Status: DISCONTINUED | OUTPATIENT
Start: 2022-03-15 | End: 2022-03-15 | Stop reason: HOSPADM

## 2022-03-15 RX ORDER — MIDAZOLAM HYDROCHLORIDE 1 MG/ML
2 INJECTION, SOLUTION INTRAMUSCULAR; INTRAVENOUS
Status: DISCONTINUED | OUTPATIENT
Start: 2022-03-15 | End: 2022-03-15 | Stop reason: HOSPADM

## 2022-03-15 RX ORDER — SODIUM CHLORIDE, SODIUM LACTATE, POTASSIUM CHLORIDE, CALCIUM CHLORIDE 600; 310; 30; 20 MG/100ML; MG/100ML; MG/100ML; MG/100ML
100 INJECTION, SOLUTION INTRAVENOUS CONTINUOUS
Status: DISCONTINUED | OUTPATIENT
Start: 2022-03-15 | End: 2022-03-15 | Stop reason: HOSPADM

## 2022-03-15 RX ADMIN — ACETAMINOPHEN 1000 MG: 500 TABLET, FILM COATED ORAL at 12:10

## 2022-03-15 RX ADMIN — SODIUM CHLORIDE, SODIUM LACTATE, POTASSIUM CHLORIDE, AND CALCIUM CHLORIDE 100 ML/HR: 600; 310; 30; 20 INJECTION, SOLUTION INTRAVENOUS at 11:07

## 2022-03-15 RX ADMIN — FENTANYL CITRATE 25 MCG: 50 INJECTION INTRAMUSCULAR; INTRAVENOUS at 13:00

## 2022-03-15 RX ADMIN — Medication 10 MG: at 12:55

## 2022-03-15 RX ADMIN — LIDOCAINE HYDROCHLORIDE 100 MG: 20 INJECTION, SOLUTION EPIDURAL; INFILTRATION; INTRACAUDAL; PERINEURAL at 12:47

## 2022-03-15 RX ADMIN — PROPOFOL 200 MG: 10 INJECTION, EMULSION INTRAVENOUS at 12:47

## 2022-03-15 RX ADMIN — PROPOFOL 30 MG: 10 INJECTION, EMULSION INTRAVENOUS at 13:40

## 2022-03-15 RX ADMIN — PROPOFOL 40 MG: 10 INJECTION, EMULSION INTRAVENOUS at 13:00

## 2022-03-15 RX ADMIN — FENTANYL CITRATE 25 MCG: 50 INJECTION INTRAMUSCULAR; INTRAVENOUS at 13:42

## 2022-03-15 RX ADMIN — FENTANYL CITRATE 50 MCG: 50 INJECTION INTRAMUSCULAR; INTRAVENOUS at 12:50

## 2022-03-15 RX ADMIN — Medication 2 G: at 12:44

## 2022-03-15 NOTE — BRIEF OP NOTE
Brief Postoperative Note    Patient: Palmer Purdy  YOB: 1975  MRN: [id number]    Date of Procedure: 3/15/2022     Pre-Op Diagnosis: Hydronephrosis with ureteral stricture, not elsewhere classified [N13.1]    Post-Op Diagnosis: Same as preoperative diagnosis. Procedure(s):  CYSTOSCOPY W/ LEFT  URETERAL STENT INSERTION AND LEFT URETEROSCOPY/ RIGHT URETERAL STENT EXCHANGE    Surgeon(s):  Josh Humphrey MD    Surgical Assistant: None    Anesthesia: General     Estimated Blood Loss (mL): Minimal    Complications: None    Specimens: * No specimens in log *     Implants:   Implant Name Type Inv. Item Serial No.  Lot No. LRB No. Used Action   STENT URET 7F 22CM -- [address] [place], [place] [id number] - [id number]  Jason Stalling 7F 22CM -- [address] [place] [address] [id number]  [employer]_ [id number] Right 1 Implanted   STENT URET 7F 22CM -- [address] [place] [address] [id number] - [id number]  STENT URET 7F 22CM -- [address] [place] [address] [id number]  [employer]_ [id number] Left 1 Implanted       Drains: * No LDAs found *    Findings: retained stent had migrataged up left ureter; unable to reach with ureteroscope as distal ureter was narrow. 2nd stent placed and will attempt to remove after ureter dilates; right JJ exchanged.     Electronically Signed by Theodore Ferrari MD on 3/15/2022 at 1:55 PM

## 2022-03-15 NOTE — ANESTHESIA PREPROCEDURE EVALUATION
Anesthetic History   No history of anesthetic complications            Review of Systems / Medical History  Patient summary reviewed and pertinent labs reviewed    Pulmonary  Within defined limits                 Neuro/Psych   Within defined limits           Cardiovascular  Within defined limits                Exercise tolerance: >4 METS     GI/Hepatic/Renal     GERD: well controlled    Renal disease (Obstructive Hydronephrosis.)       Endo/Other        Cancer (Ovarian Cancer s/p surgical resection.  Chemo to start next week.) and anemia (Hgb 10.4)     Other Findings              Physical Exam    Airway  Mallampati: I  TM Distance: > 6 cm  Neck ROM: normal range of motion   Mouth opening: Normal     Cardiovascular  Regular rate and rhythm,  S1 and S2 normal,  no murmur, click, rub, or gallop  Rhythm: regular  Rate: normal         Dental  No notable dental hx       Pulmonary  Breath sounds clear to auscultation               Abdominal  GI exam deferred       Other Findings            Anesthetic Plan    ASA: 2  Anesthesia type: general          Induction: Intravenous  Anesthetic plan and risks discussed with: Patient

## 2022-03-15 NOTE — DISCHARGE INSTRUCTIONS
Patient Education        Cystoscopy: What to Expect at [facility]     A cystoscopy is a procedure that lets a doctor look inside of the bladder and the urethra. The urethra is the tube that carries urine from the bladder to outside the body. The doctor uses a thin, lighted tool called a cystoscope. Your bladder is filled with fluid. This stretches the bladder so that your doctor can look closely at the inside of your bladder. After the cystoscopy, your urethra may be sore at first, and it may burn when you urinate for the first few days after the procedure. You may feel the need to urinate more often, and your urine may be pink. These symptoms should get better in 1 or 2 days. You will probably be able to go back to most of your usual activities in 1 or 2 days. This care sheet gives you a general idea about how long it will take for you to recover. But each person recovers at a different pace. Follow the steps below to get better as quickly as possible. How can you care for yourself at home? Activity    · Rest when you feel tired. Getting enough sleep will help you recover.     · Try to walk each day. Start by walking a little more than you did the day before. Bit by bit, increase the amount you walk. Walking boosts blood flow and helps prevent pneumonia and constipation.     · Avoid strenuous activities, such as bicycle riding, jogging, weight lifting, or aerobic exercise, until your doctor says it is okay.     · Ask your doctor when you can drive again.     · Most people are able to return to work within 1 or 2 days after the procedure.     · You may shower and take baths as usual.     · Ask your doctor when it is okay for you to have sex. Diet    · You can eat your normal diet. If your stomach is upset, try bland, low-fat foods like plain rice, broiled chicken, toast, and yogurt.     · Drink plenty of fluids (unless your doctor tells you not to).    Medicines    · Take pain medicines exactly as directed. ? If the doctor gave you a prescription medicine for pain, take it as prescribed. ? If you are not taking a prescription pain medicine, ask your doctor if you can take an over-the-counter medicine.     · If you think your pain medicine is making you sick to your stomach:  ? Take your medicine after meals (unless your doctor has told you not to). ? Ask your doctor for a different pain medicine.     · If your doctor prescribed antibiotics, take them as directed. Do not stop taking them just because you feel better. You need to take the full course of antibiotics. Follow-up care is a key part of your treatment and safety. Be sure to make and go to all appointments, and call your doctor if you are having problems. It's also a good idea to know your test results and keep a list of the medicines you take. When should you call for help? Call 911 anytime you think you may need emergency care. For example, call if:    · You passed out (lost consciousness).     · You have severe trouble breathing.     · You have sudden chest pain and shortness of breath, or you cough up blood.     · You have severe belly pain. Call your doctor now or seek immediate medical care if:    · You are sick to your stomach or cannot keep fluids down.     · Your urine is still red or you see blood clots after you have urinated several times.     · You have trouble passing urine or stool, especially if you have pain or swelling in your lower belly.     · You have signs of a blood clot, such as:  ? Pain in your calf, back of the knee, thigh, or groin. ? Redness and swelling in your leg or groin.     · You develop a fever or severe chills.     · You have pain in your back just below your rib cage. This is called flank pain. Watch closely for changes in your health, and be sure to contact your doctor if:    · You have pain or burning when you urinate.  A burning feeling is normal for a day or two after the test, but call if it does not get better.     · You have a frequent urge to urinate but can pass only small amounts of urine.     · Your urine is pink, red, or cloudy, or smells bad. It is normal for the urine to have a pinkish color for a few days after the test, but call if it does not get better. Where can you learn more? Go to http://www.gray.com/  Enter C842 in the search box to learn more about \"Cystoscopy: What to Expect at Home. \"  Current as of: October 18, 2021               Content Version: 13.2  © 7971-9989 [employer]. Care instructions adapted under license by [employer] (which disclaims liability or warranty for this information). If you have questions about a medical condition or this instruction, always ask your healthcare professional. [address] any warranty or liability for your use of this information.

## 2022-03-15 NOTE — ANESTHESIA POSTPROCEDURE EVALUATION
Procedure(s):  CYSTOSCOPY W/ LEFT  URETERAL STENT INSERTION AND LEFT URETEROSCOPY/ RIGHT URETERAL STENT EXCHANGE.     general    Anesthesia Post Evaluation      Multimodal analgesia: multimodal analgesia used between 6 hours prior to anesthesia start to PACU discharge  Patient location during evaluation: bedside  Patient participation: complete - patient participated  Level of consciousness: awake  Pain management: adequate  Airway patency: patent  Anesthetic complications: no  Cardiovascular status: acceptable  Respiratory status: spontaneous ventilation and acceptable  Hydration status: acceptable  Post anesthesia nausea and vomiting:  none      INITIAL Post-op Vital signs:   Vitals Value Taken Time   /70 03/15/22 1415   Temp 36.8 °C (98.3 °F) 03/15/22 1355   Pulse 62 03/15/22 1415   Resp 18 03/15/22 1415   SpO2 100 % 03/15/22 1415

## 2022-03-15 NOTE — PERIOP NOTES
PACU DISCHARGE NOTE    Patient's spouse, Gia Ta, voiced understanding of discharge instructions. Vital signs stable, pain well controlled, alert and oriented times three or at baseline, follow up per surgeon, no anesthetic complications.

## 2022-03-16 ENCOUNTER — HOSPITAL ENCOUNTER (OUTPATIENT)
Dept: GENERAL RADIOLOGY | Age: 47
Discharge: HOME | End: 2022-03-16
Attending: UROLOGY

## 2022-03-16 ENCOUNTER — DOCUMENTATION ONLY (OUTPATIENT)
Dept: HEMATOLOGY | Age: 47
End: 2022-03-16

## 2022-03-16 PROBLEM — C80.1 CARCINOMA OF UNKNOWN PRIMARY (HCC): Status: ACTIVE | Noted: 2022-03-16

## 2022-03-16 NOTE — PROGRESS NOTES
I spoke with Blanca Navarrete regarding her current [employer], potential oral medication authorizations, enrollment in the [address] ([facility]) and the [employer] ([address]), and assistance organization resource sheet. The patient will review the cancer programs. Next, I spoke with Blanca Navarrete regarding her insurance questions. I answered questions about medical treatments and services. Next, I spoke with Blanca Navarrete regarding the financial assistance application. Next, I spoke with Blanca Navarrete about deductibles, copayments, and out of pocket maximums regarding her [employer]. Next, I spoke with Blanca Navarrete regarding the Limited Power of [employer]. After reading the form, Blanca Navarrete signed the Limited Power of [employer] Next, we discussed costs associated with the Avastin, Folfox, Carboplatin Medications. Next, I spoke with Blanca Navarrete regarding potential oral medication authorizations. I told her that if she ever had any problems getting her oral medications filled to give the dedicated [facility]  a call. Most of the time, it is simply an authorization that needs to be done with the insurance company. Lastly, I gave Blanca Navarrete a form with various resource organizations that could assist with specific needs (example:  transportation, lodging, preparing meals, home cleaning)     Blanca Navarrete expressed understanding of the information above and all questions were answered to her satisfaction.

## 2022-03-16 NOTE — OP NOTES
[facility]  OPERATIVE REPORT    Name:  Chio Zepeda  MR#:  [id number]  :  1975  ACCOUNT #:  [de-identified]  DATE OF SERVICE:  03/15/2022    PREOPERATIVE DIAGNOSES:  1. Migrated left ureteral stent. 2.  Bilateral hydronephrosis. POSTOPERATIVE DIAGNOSES:  1. Migrated left ureteral stent. 2.  Bilateral hydronephrosis. PROCEDURES PERFORMED:  Cystoscopy, bilateral retrograde pyelograms, exchange of right double-J ureteral stent, left ureteroscopy, placement of left double-J ureteral stent. SURGEON:  Amparo Shaffer MD    ASSISTANT:  None. ANESTHESIA:  General with an LMA. COMPLICATIONS:  None. SPECIMENS REMOVED:  None. IMPLANTS:  Bilateral 7-Filipino x 22-cm double-J stents. ESTIMATED BLOOD LOSS:  Minimal.    FLUOROSCOPIC FINDINGS:  After removal of the right stent, a retrograde pyelogram demonstrated a quite narrow distal right ureteral ureter with mild proximal hydronephrosis. There were no definite filling defects but there appeared to possibly be external compression on the ureter. Retrograde pyelogram on the left side demonstrated a ureteral stent that had migrated up the distal ureter to approximately the mid ureter. There was also significant narrowing of the left distal ureter with mild proximal hydronephrosis. INDICATIONS FOR THE PROCEDURE:  The patient is a pleasant 66-year-old female who has a history of advanced colon cancer. She underwent exploratory laparotomy by Dr. July Perez on 02/15/2022 and was found to have advanced colon cancer. He placed bilateral ureteral stents at that time. She presented to me after CT scan showed that the left stent appeared to be malpositioned. PROCEDURE:  After informed consent was obtained, she was taking to operating room #4 in the [address] [place] [address]. After induction of general anesthesia and placement of an LMA, she was carefully positioned in low lithotomy position.   Her genitalia were prepped and draped in the usual sterile fashion. A time-out was performed. She was given Ancef as a prophylactic antibiotic. I then inserted a 22-Polish rigid cystoscope into the urethra and surveyed the bladder. The right stent was quite redundant in the bladder, had appeared to have drifted down. The left UO was visible, but there was no stent protruding from it. I then performed plain fluoroscopy and the stent was seen approximately 4-5 cm up the left ureter. I then used a stent grasper to remove the right stent and performed a retrograde pyelogram.  See findings above. The ureter was quite narrow distally. I elected to replace it with a new stent this time using a 7-Polish x 22-cm stent. Under fluoroscopic and direct visual guidance, there was a good curl in the upper collecting system as well as the bladder. I then placed a sensor wire up the left ureter and exchanged my cystoscope for a semi-rigid ureteroscope. Using a second sensor wire in a rail road technique, I was able to carefully guide the ureteroscope into the left UO. I was able to advance it approximately 2-3 cm but the ureter was quite tight. I was not able to visualize the stent. It appeared on fluoroscopy that I was 1-2 cm away from it. Rather than risk damaging the ureter, I elected to remove the ureteroscope and placed a second stent with hopes the ureter would dilate adequately so I could reach the retained stent with a semi-rigid ureteroscope. The ureteroscope was removed. I then performed a retrograde on the left side with the above findings. The wire was then replaced without difficulty. Then using fluoroscopic and cystoscopic guidance, I placed a 7-Polish x 22-cm double-J stent in the left ureter. Again, a good curl was in the upper portion of the collecting system and the bladder. The bladder was then emptied completely and the scope removed. The patient was awakened. LMA was removed.   She was taken to recovery room in stable condition. There were no known complications. DISPOSITION:  She will go home today. I would like to see her back in a week and a half in order to return to the operating room. The plan will be to remove the recently placed left ureteral stent. Hopefully then, the ureter would have dilated enough such that I can pass a semi-rigid ureteroscope up to the level of the retained stent and remove it using a ureteroscopic basket. We would then replace a left stent. I would then plan to reassess her need for indwelling stents 3-4 months later. My office will call and schedule this procedure hopefully for Thursday after next.       Adelia Andres MD      [id number]  D:  03/15/2022 14:14  T:  03/15/2022 22:30  JOB #:  [id number]

## 2022-03-18 ENCOUNTER — HOSPITAL ENCOUNTER (OUTPATIENT)
Dept: INFUSION THERAPY | Age: 47
End: 2022-03-18

## 2022-03-18 PROBLEM — D21.9 FIBROIDS: Status: ACTIVE | Noted: 2020-07-09

## 2022-03-19 PROBLEM — D64.9 ANEMIA: Status: ACTIVE | Noted: 2020-07-09

## 2022-03-19 PROBLEM — D50.0 IRON DEFICIENCY ANEMIA DUE TO CHRONIC BLOOD LOSS: Status: ACTIVE | Noted: 2018-06-01

## 2022-03-19 PROBLEM — N92.0 MENORRHAGIA WITH REGULAR CYCLE: Status: ACTIVE | Noted: 2018-06-28

## 2022-03-20 PROBLEM — Z98.84 HISTORY OF WEIGHT LOSS SURGERY: Status: ACTIVE | Noted: 2019-01-24

## 2022-03-22 ENCOUNTER — APPOINTMENT (OUTPATIENT)
Dept: GENERAL RADIOLOGY | Age: 47
End: 2022-03-22
Attending: STUDENT IN AN ORGANIZED HEALTH CARE EDUCATION/TRAINING PROGRAM
Payer: COMMERCIAL

## 2022-03-22 ENCOUNTER — HOSPITAL ENCOUNTER (EMERGENCY)
Age: 47
Discharge: HOME | End: 2022-03-23
Attending: EMERGENCY MEDICINE
Payer: COMMERCIAL

## 2022-03-22 DIAGNOSIS — R07.89 ATYPICAL CHEST PAIN: Primary | ICD-10-CM

## 2022-03-22 DIAGNOSIS — E87.6 HYPOKALEMIA: ICD-10-CM

## 2022-03-22 LAB
ALBUMIN SERPL-MCNC: 3.2 G/DL (ref 3.5–5)
ALBUMIN/GLOB SERPL: 0.9 {RATIO} (ref 1.2–3.5)
ALP SERPL-CCNC: 55 U/L (ref 50–136)
ALT SERPL-CCNC: 18 U/L (ref 12–65)
ANION GAP SERPL CALC-SCNC: 3 MMOL/L (ref 7–16)
AST SERPL-CCNC: 12 U/L (ref 15–37)
BASOPHILS # BLD: 0 K/UL (ref 0–0.2)
BASOPHILS NFR BLD: 0 % (ref 0–2)
BILIRUB SERPL-MCNC: 0.3 MG/DL (ref 0.2–1.1)
BUN SERPL-MCNC: 9 MG/DL (ref 6–23)
CALCIUM SERPL-MCNC: 8.9 MG/DL (ref 8.3–10.4)
CHLORIDE SERPL-SCNC: 103 MMOL/L (ref 98–107)
CO2 SERPL-SCNC: 33 MMOL/L (ref 21–32)
CREAT SERPL-MCNC: 0.6 MG/DL (ref 0.6–1)
DIFFERENTIAL METHOD BLD: ABNORMAL
EOSINOPHIL # BLD: 0.2 K/UL (ref 0–0.8)
EOSINOPHIL NFR BLD: 2 % (ref 0.5–7.8)
ERYTHROCYTE [DISTWIDTH] IN BLOOD BY AUTOMATED COUNT: 13 % (ref 11.9–14.6)
GLOBULIN SER CALC-MCNC: 3.4 G/DL (ref 2.3–3.5)
GLUCOSE SERPL-MCNC: 92 MG/DL (ref 65–100)
HCT VFR BLD AUTO: 31.9 % (ref 35.8–46.3)
HGB BLD-MCNC: 9.8 G/DL (ref 11.7–15.4)
IMM GRANULOCYTES # BLD AUTO: 0 K/UL (ref 0–0.5)
IMM GRANULOCYTES NFR BLD AUTO: 0 % (ref 0–5)
LIPASE SERPL-CCNC: 81 U/L (ref 73–393)
LYMPHOCYTES # BLD: 1.7 K/UL (ref 0.5–4.6)
LYMPHOCYTES NFR BLD: 22 % (ref 13–44)
MAGNESIUM SERPL-MCNC: 1.7 MG/DL (ref 1.8–2.4)
MCH RBC QN AUTO: 27.5 PG (ref 26.1–32.9)
MCHC RBC AUTO-ENTMCNC: 30.7 G/DL (ref 31.4–35)
MCV RBC AUTO: 89.6 FL (ref 79.6–97.8)
MONOCYTES # BLD: 0.7 K/UL (ref 0.1–1.3)
MONOCYTES NFR BLD: 9 % (ref 4–12)
NEUTS SEG # BLD: 5.2 K/UL (ref 1.7–8.2)
NEUTS SEG NFR BLD: 66 % (ref 43–78)
NRBC # BLD: 0 K/UL (ref 0–0.2)
PLATELET # BLD AUTO: 198 K/UL (ref 150–450)
PMV BLD AUTO: 10.2 FL (ref 9.4–12.3)
POTASSIUM SERPL-SCNC: 2.7 MMOL/L (ref 3.5–5.1)
PROT SERPL-MCNC: 6.6 G/DL (ref 6.3–8.2)
RBC # BLD AUTO: 3.56 M/UL (ref 4.05–5.2)
SODIUM SERPL-SCNC: 139 MMOL/L (ref 136–145)
TROPONIN-HIGH SENSITIVITY: 5.1 PG/ML (ref 0–14)
WBC # BLD AUTO: 7.9 K/UL (ref 4.3–11.1)

## 2022-03-22 PROCEDURE — 85025 COMPLETE CBC W/AUTO DIFF WBC: CPT

## 2022-03-22 PROCEDURE — 99285 EMERGENCY DEPT VISIT HI MDM: CPT

## 2022-03-22 PROCEDURE — 83690 ASSAY OF LIPASE: CPT

## 2022-03-22 PROCEDURE — 94762 N-INVAS EAR/PLS OXIMTRY CONT: CPT

## 2022-03-22 PROCEDURE — 83735 ASSAY OF MAGNESIUM: CPT

## 2022-03-22 PROCEDURE — 93005 ELECTROCARDIOGRAM TRACING: CPT | Performed by: EMERGENCY MEDICINE

## 2022-03-22 PROCEDURE — 71045 X-RAY EXAM CHEST 1 VIEW: CPT

## 2022-03-22 PROCEDURE — 93005 ELECTROCARDIOGRAM TRACING: CPT | Performed by: STUDENT IN AN ORGANIZED HEALTH CARE EDUCATION/TRAINING PROGRAM

## 2022-03-22 PROCEDURE — 84484 ASSAY OF TROPONIN QUANT: CPT

## 2022-03-22 PROCEDURE — 80053 COMPREHEN METABOLIC PANEL: CPT

## 2022-03-22 RX ORDER — SODIUM CHLORIDE 0.9 % (FLUSH) 0.9 %
5-10 SYRINGE (ML) INJECTION EVERY 8 HOURS
Status: DISCONTINUED | OUTPATIENT
Start: 2022-03-22 | End: 2022-03-23 | Stop reason: HOSPADM

## 2022-03-22 RX ORDER — SODIUM CHLORIDE 0.9 % (FLUSH) 0.9 %
5-10 SYRINGE (ML) INJECTION AS NEEDED
Status: DISCONTINUED | OUTPATIENT
Start: 2022-03-22 | End: 2022-03-23 | Stop reason: HOSPADM

## 2022-03-23 ENCOUNTER — ANESTHESIA EVENT (OUTPATIENT)
Dept: SURGERY | Age: 47
End: 2022-03-23
Payer: COMMERCIAL

## 2022-03-23 VITALS
HEIGHT: 64 IN | RESPIRATION RATE: 20 BRPM | HEART RATE: 56 BPM | OXYGEN SATURATION: 97 % | SYSTOLIC BLOOD PRESSURE: 109 MMHG | WEIGHT: 119 LBS | TEMPERATURE: 99.3 F | DIASTOLIC BLOOD PRESSURE: 72 MMHG | BODY MASS INDEX: 20.32 KG/M2

## 2022-03-23 LAB
ATRIAL RATE: 60 BPM
CALCULATED P AXIS, ECG09: 40 DEGREES
CALCULATED R AXIS, ECG10: 60 DEGREES
CALCULATED T AXIS, ECG11: 50 DEGREES
DIAGNOSIS, 93000: NORMAL
P-R INTERVAL, ECG05: 126 MS
Q-T INTERVAL, ECG07: 429 MS
QRS DURATION, ECG06: 91 MS
QTC CALCULATION (BEZET), ECG08: 429 MS
TROPONIN-HIGH SENSITIVITY: 5.3 PG/ML (ref 0–14)
VENTRICULAR RATE, ECG03: 60 BPM

## 2022-03-23 PROCEDURE — 74011250637 HC RX REV CODE- 250/637: Performed by: EMERGENCY MEDICINE

## 2022-03-23 PROCEDURE — 84484 ASSAY OF TROPONIN QUANT: CPT

## 2022-03-23 RX ORDER — POTASSIUM CHLORIDE 1.5 G/1.77G
20 POWDER, FOR SOLUTION ORAL 2 TIMES DAILY WITH MEALS
Qty: 10 PACKET | Refills: 0 | Status: SHIPPED | OUTPATIENT
Start: 2022-03-23 | End: 2022-03-28

## 2022-03-23 RX ORDER — POTASSIUM CHLORIDE 20 MEQ/1
40 TABLET, EXTENDED RELEASE ORAL
Status: COMPLETED | OUTPATIENT
Start: 2022-03-23 | End: 2022-03-23

## 2022-03-23 RX ADMIN — POTASSIUM CHLORIDE 40 MEQ: 20 TABLET, EXTENDED RELEASE ORAL at 00:59

## 2022-03-23 NOTE — ED NOTES
Discharge instructions including prescriptions reviewed with pt. Pt verbalized understanding. VSS, PIV removed. Pt ambulatory to exit in stable condition.

## 2022-03-23 NOTE — ED PROVIDER NOTES
Patient is a 72-year-old female with a history of colon cancer, GERD and COVID-19 who presents with chest pain. She states around 5 PM she was taking a nap when she had an episode of chest pain. It lasted for less than a minute. It was lower midsternal burning pain that seem to radiate slightly to her right chest.  The pain was 7/10 in intensity. She denies any associated shortness of breath or nausea or diaphoresis. She states she had a total of 3 episodes of this pain, all lasting for around a few seconds and then resolving on their own. She states she has had similar pain in the past.  She did not take any medicine for, denies any aggravating or alleviating factors.            Past Medical History:   Diagnosis Date    Anemia     -- not a problem since hyst    Colon cancer ([facility] [address] dx 2022     plans for chemo--- followed by dr Zulema Nguyễn COVID-19 2020    no hospitalization    GERD (gastroesophageal reflux disease)     managed with med    History of colonoscopy 2018    Dr. Cynthia Ruiz, nl (see media note), R     Hypotension     asymptomatic    Obstruction of fallopian tube     per pt has \"1 good tube\"    Weight loss     80lbs weight loss after gastric sleeve       Past Surgical History:   Procedure Laterality Date    HX  SECTION      HX ENDOSCOPY      with dilation    HX GASTRIC BYPASS  2014    gastric sleeve- Choud[facility]ari    HX HYSTERECTOMY      HX MYOMECTOMY  age \"early 21s\"    also \"unblocked her FT\"    HX TONSILLECTOMY      HX TOTAL LAPAROSCOPIC HYSTERECTOMY  2020    TLH w/ Bilateral salpingectomy and left oophorectomy    HX UROLOGICAL Bilateral 03/15/2022    cysto    IR INSERT TUNL CVC W PORT OVER 5 YEARS  2022         Family History:   Problem Relation Age of Onset    Heart Disease Maternal Grandmother     Hypertension Maternal Grandmother     Heart Disease Maternal Grandfather     Hypertension Maternal Grandfather     Breast Cancer Neg Hx     Colon Cancer Neg Hx     Deep Vein Thrombosis Neg Hx     Ovarian Cancer Neg Hx     Prostate Cancer Neg Hx     Pulmonary Embolism Neg Hx        Social History     Socioeconomic History    Marital status: SINGLE     Spouse name: Not on file    Number of children: Not on file    Years of education: Not on file    Highest education level: Not on file   Occupational History    Occupation: planner   Tobacco Use    Smoking status: Never Smoker    Smokeless tobacco: Never Used   Vaping Use    Vaping Use: Never used   Substance and Sexual Activity    Alcohol use: Not Currently     Comment: none x 4 months    Drug use: No    Sexual activity: Not on file   Other Topics Concern    Not on file   Social History Narrative    1. Use large speculum. 2.  sister, Gaby Lizama #[id number] and mom is Earnestine Wilkinson #[id number] (both Kofoed's pts)    3. PCP  Dr. Gamaliel Odonnell ([facility]), GI Dr. Willa Closs Perkins-2018 normal EGD     Social Determinants of Health     Financial Resource Strain:     Difficulty of Paying Living Expenses: Not on file   Food Insecurity:     Worried About Running Out of Food in the Last Year: Not on file    Caterina of Food in the Last Year: Not on file   Transportation Needs:     Lack of Transportation (Medical): Not on file    Lack of Transportation (Non-Medical):  Not on file   Physical Activity:     Days of Exercise per Week: Not on file    Minutes of Exercise per Session: Not on file   Stress:     Feeling of Stress : Not on file   Social Connections:     Frequency of Communication with Friends and Family: Not on file    Frequency of Social Gatherings with Friends and Family: Not on file    Attends Oriental orthodox Services: Not on file    Active Member of Clubs or Organizations: Not on file    Attends Club or Organization Meetings: Not on file    Marital Status: Not on file   Intimate Partner Violence:     Fear of Current or Ex-Partner: Not on file    Emotionally Abused: Not on file   [facility] Physically Abused: Not on file    Sexually Abused: Not on file   Housing Stability:     Unable to Pay for Housing in the Last Year: Not on file    Number of Places Lived in the Last Year: Not on file    Unstable Housing in the Last Year: Not on file         ALLERGIES: Patient has no known allergies. Review of Systems   Constitutional: Negative for chills and fever. Gastrointestinal: Negative for nausea and vomiting. All other systems reviewed and are negative. Vitals:    03/22/22 2216 03/22/22 2230 03/22/22 2234   BP: 128/82 114/72    Pulse: 62 62    Resp: 18 17    Temp: 99.3 °F (37.4 °C)     SpO2: 98% 98% 98%   Weight: 54 kg (119 lb)     Height: 5' 4\" (1.626 m)              Physical Exam  Vitals and nursing note reviewed. Constitutional:       Appearance: Normal appearance. She is well-developed and normal weight. HENT:      Head: Normocephalic and atraumatic. Eyes:      Conjunctiva/sclera: Conjunctivae normal.      Pupils: Pupils are equal, round, and reactive to light. Cardiovascular:      Pulses: Normal pulses. Pulmonary:      Effort: Pulmonary effort is normal. No respiratory distress. Abdominal:      General: Bowel sounds are normal. There is no distension. Palpations: Abdomen is soft. Tenderness: There is no abdominal tenderness. Musculoskeletal:         General: No tenderness. Cervical back: Normal range of motion and neck supple. Right lower leg: No edema. Left lower leg: No edema. Skin:     General: Skin is warm and dry. Neurological:      Mental Status: She is alert and oriented to person, place, and time. Psychiatric:         Behavior: Behavior normal.          MDM  Number of Diagnoses or Management Options  Atypical chest pain: new and does not require workup  Diagnosis management comments: 1:27 AM discussed results with patient, unremarkable work-up. She has a primary doctor she can follow-up with.   Heart score is 1       Amount and/or Complexity of Data Reviewed  Clinical lab tests: ordered and reviewed  Tests in the radiology section of CPT®: ordered and reviewed  Tests in the medicine section of CPT®: ordered and reviewed    Risk of Complications, Morbidity, and/or Mortality  Presenting problems: moderate  Diagnostic procedures: low  Management options: moderate    Patient Progress  Patient progress: stable         EKG    Date/Time: 3/22/2022 11:28 PM  Performed by: Bing Washburn MD  Authorized by: Bing Washburn MD     ECG reviewed by ED Physician in the absence of a cardiologist: yes    Previous ECG:     Previous ECG:  Unavailable  Interpretation:     Interpretation: normal    Rate:     ECG rate:  60    ECG rate assessment: normal    Rhythm:     Rhythm: sinus rhythm    Ectopy:     Ectopy: none    QRS:     QRS axis:  Normal    QRS intervals:  Normal  Conduction:     Conduction: normal    ST segments:     ST segments:  Normal  T waves:     T waves: normal

## 2022-03-23 NOTE — ED TRIAGE NOTES
Pt arrives from home with mask in place. Pt was diagnosed with colon cancer in February. Has been having chest pain since about 5pm today. Stated that it feels like something heavy is sitting on her chest and that she has a burning sensation as well. Denies n/v/d, SOB or pain radiation to neck, back, shoulder or arms.

## 2022-03-24 ENCOUNTER — HOSPITAL ENCOUNTER (OUTPATIENT)
Age: 47
Setting detail: OUTPATIENT SURGERY
Discharge: HOME | End: 2022-03-24
Attending: UROLOGY | Admitting: UROLOGY
Payer: COMMERCIAL

## 2022-03-24 ENCOUNTER — HOSPITAL ENCOUNTER (OUTPATIENT)
Dept: GENERAL RADIOLOGY | Age: 47
Discharge: HOME | End: 2022-03-24
Attending: UROLOGY

## 2022-03-24 ENCOUNTER — ANESTHESIA (OUTPATIENT)
Dept: SURGERY | Age: 47
End: 2022-03-24
Payer: COMMERCIAL

## 2022-03-24 VITALS
DIASTOLIC BLOOD PRESSURE: 78 MMHG | HEIGHT: 64 IN | OXYGEN SATURATION: 99 % | HEART RATE: 71 BPM | WEIGHT: 120.2 LBS | SYSTOLIC BLOOD PRESSURE: 139 MMHG | RESPIRATION RATE: 20 BRPM | BODY MASS INDEX: 20.52 KG/M2 | TEMPERATURE: 98.2 F

## 2022-03-24 DIAGNOSIS — N13.30 HYDRONEPHROSIS OF LEFT KIDNEY: ICD-10-CM

## 2022-03-24 PROBLEM — C80.1 CARCINOMA OF UNKNOWN PRIMARY (HCC): Status: ACTIVE | Noted: 2022-03-16

## 2022-03-24 LAB — POTASSIUM BLD-SCNC: 3.5 MMOL/L (ref 3.5–5.1)

## 2022-03-24 PROCEDURE — 52332 CYSTOSCOPY AND TREATMENT: CPT | Performed by: UROLOGY

## 2022-03-24 PROCEDURE — 77030040361 HC SLV COMPR DVT MDII -B: Performed by: UROLOGY

## 2022-03-24 PROCEDURE — 77030039425 HC BLD LARYNG TRULITE DISP TELE -A: Performed by: ANESTHESIOLOGY

## 2022-03-24 PROCEDURE — 84132 ASSAY OF SERUM POTASSIUM: CPT

## 2022-03-24 PROCEDURE — 76210000020 HC REC RM PH II FIRST 0.5 HR: Performed by: UROLOGY

## 2022-03-24 PROCEDURE — 77030037088 HC TUBE ENDOTRACH ORAL NSL COVD-A: Performed by: ANESTHESIOLOGY

## 2022-03-24 PROCEDURE — 74011250636 HC RX REV CODE- 250/636: Performed by: ANESTHESIOLOGY

## 2022-03-24 PROCEDURE — C1769 GUIDE WIRE: HCPCS | Performed by: UROLOGY

## 2022-03-24 PROCEDURE — 74011250636 HC RX REV CODE- 250/636: Performed by: UROLOGY

## 2022-03-24 PROCEDURE — C2617 STENT, NON-COR, TEM W/O DEL: HCPCS | Performed by: UROLOGY

## 2022-03-24 PROCEDURE — 76210000006 HC OR PH I REC 0.5 TO 1 HR: Performed by: UROLOGY

## 2022-03-24 PROCEDURE — 52351 CYSTOURETERO & OR PYELOSCOPE: CPT | Performed by: UROLOGY

## 2022-03-24 PROCEDURE — C1758 CATHETER, URETERAL: HCPCS | Performed by: UROLOGY

## 2022-03-24 PROCEDURE — 76060000032 HC ANESTHESIA 0.5 TO 1 HR: Performed by: UROLOGY

## 2022-03-24 PROCEDURE — 74011000250 HC RX REV CODE- 250: Performed by: NURSE ANESTHETIST, CERTIFIED REGISTERED

## 2022-03-24 PROCEDURE — 74011250636 HC RX REV CODE- 250/636: Performed by: NURSE ANESTHETIST, CERTIFIED REGISTERED

## 2022-03-24 PROCEDURE — 2709999900 HC NON-CHARGEABLE SUPPLY: Performed by: UROLOGY

## 2022-03-24 PROCEDURE — 76010000160 HC OR TIME 0.5 TO 1 HR INTENSV-TIER 1: Performed by: UROLOGY

## 2022-03-24 DEVICE — URETERAL STENT
Type: IMPLANTABLE DEVICE | Site: URETER | Status: FUNCTIONAL
Brand: PERCUFLEX™ PLUS

## 2022-03-24 RX ORDER — GLYCOPYRROLATE 0.2 MG/ML
INJECTION INTRAMUSCULAR; INTRAVENOUS AS NEEDED
Status: DISCONTINUED | OUTPATIENT
Start: 2022-03-24 | End: 2022-03-24 | Stop reason: HOSPADM

## 2022-03-24 RX ORDER — NALOXONE HYDROCHLORIDE 0.4 MG/ML
0.04 INJECTION, SOLUTION INTRAMUSCULAR; INTRAVENOUS; SUBCUTANEOUS
Status: DISCONTINUED | OUTPATIENT
Start: 2022-03-24 | End: 2022-03-24 | Stop reason: HOSPADM

## 2022-03-24 RX ORDER — MIDAZOLAM HYDROCHLORIDE 1 MG/ML
2 INJECTION, SOLUTION INTRAMUSCULAR; INTRAVENOUS ONCE
Status: DISCONTINUED | OUTPATIENT
Start: 2022-03-24 | End: 2022-03-24 | Stop reason: HOSPADM

## 2022-03-24 RX ORDER — LIDOCAINE HYDROCHLORIDE 10 MG/ML
0.1 INJECTION INFILTRATION; PERINEURAL AS NEEDED
Status: DISCONTINUED | OUTPATIENT
Start: 2022-03-24 | End: 2022-03-24 | Stop reason: HOSPADM

## 2022-03-24 RX ORDER — PROPOFOL 10 MG/ML
INJECTION, EMULSION INTRAVENOUS AS NEEDED
Status: DISCONTINUED | OUTPATIENT
Start: 2022-03-24 | End: 2022-03-24 | Stop reason: HOSPADM

## 2022-03-24 RX ORDER — LIDOCAINE HYDROCHLORIDE 20 MG/ML
INJECTION, SOLUTION EPIDURAL; INFILTRATION; INTRACAUDAL; PERINEURAL AS NEEDED
Status: DISCONTINUED | OUTPATIENT
Start: 2022-03-24 | End: 2022-03-24 | Stop reason: HOSPADM

## 2022-03-24 RX ORDER — SODIUM CHLORIDE, SODIUM LACTATE, POTASSIUM CHLORIDE, CALCIUM CHLORIDE 600; 310; 30; 20 MG/100ML; MG/100ML; MG/100ML; MG/100ML
100 INJECTION, SOLUTION INTRAVENOUS CONTINUOUS
Status: DISCONTINUED | OUTPATIENT
Start: 2022-03-24 | End: 2022-03-24 | Stop reason: HOSPADM

## 2022-03-24 RX ORDER — OXYCODONE HYDROCHLORIDE 5 MG/1
5 TABLET ORAL
Status: DISCONTINUED | OUTPATIENT
Start: 2022-03-24 | End: 2022-03-24 | Stop reason: HOSPADM

## 2022-03-24 RX ORDER — MIDAZOLAM HYDROCHLORIDE 1 MG/ML
2 INJECTION, SOLUTION INTRAMUSCULAR; INTRAVENOUS
Status: DISCONTINUED | OUTPATIENT
Start: 2022-03-24 | End: 2022-03-24 | Stop reason: HOSPADM

## 2022-03-24 RX ORDER — FENTANYL CITRATE 50 UG/ML
100 INJECTION, SOLUTION INTRAMUSCULAR; INTRAVENOUS ONCE
Status: DISCONTINUED | OUTPATIENT
Start: 2022-03-24 | End: 2022-03-24 | Stop reason: HOSPADM

## 2022-03-24 RX ORDER — HYDROMORPHONE HYDROCHLORIDE 2 MG/ML
0.5 INJECTION, SOLUTION INTRAMUSCULAR; INTRAVENOUS; SUBCUTANEOUS
Status: DISCONTINUED | OUTPATIENT
Start: 2022-03-24 | End: 2022-03-24 | Stop reason: HOSPADM

## 2022-03-24 RX ORDER — ROCURONIUM BROMIDE 10 MG/ML
INJECTION, SOLUTION INTRAVENOUS AS NEEDED
Status: DISCONTINUED | OUTPATIENT
Start: 2022-03-24 | End: 2022-03-24 | Stop reason: HOSPADM

## 2022-03-24 RX ORDER — ONDANSETRON 2 MG/ML
4 INJECTION INTRAMUSCULAR; INTRAVENOUS
Status: DISCONTINUED | OUTPATIENT
Start: 2022-03-24 | End: 2022-03-24 | Stop reason: HOSPADM

## 2022-03-24 RX ORDER — ONDANSETRON 2 MG/ML
INJECTION INTRAMUSCULAR; INTRAVENOUS AS NEEDED
Status: DISCONTINUED | OUTPATIENT
Start: 2022-03-24 | End: 2022-03-24 | Stop reason: HOSPADM

## 2022-03-24 RX ORDER — LEVOFLOXACIN 5 MG/ML
500 INJECTION, SOLUTION INTRAVENOUS ONCE
Status: COMPLETED | OUTPATIENT
Start: 2022-03-24 | End: 2022-03-24

## 2022-03-24 RX ORDER — NEOSTIGMINE METHYLSULFATE 1 MG/ML
INJECTION, SOLUTION INTRAVENOUS AS NEEDED
Status: DISCONTINUED | OUTPATIENT
Start: 2022-03-24 | End: 2022-03-24 | Stop reason: HOSPADM

## 2022-03-24 RX ORDER — FENTANYL CITRATE 50 UG/ML
INJECTION, SOLUTION INTRAMUSCULAR; INTRAVENOUS AS NEEDED
Status: DISCONTINUED | OUTPATIENT
Start: 2022-03-24 | End: 2022-03-24 | Stop reason: HOSPADM

## 2022-03-24 RX ORDER — DEXAMETHASONE SODIUM PHOSPHATE 4 MG/ML
INJECTION, SOLUTION INTRA-ARTICULAR; INTRALESIONAL; INTRAMUSCULAR; INTRAVENOUS; SOFT TISSUE AS NEEDED
Status: DISCONTINUED | OUTPATIENT
Start: 2022-03-24 | End: 2022-03-24 | Stop reason: HOSPADM

## 2022-03-24 RX ORDER — HALOPERIDOL 5 MG/ML
1 INJECTION INTRAMUSCULAR
Status: COMPLETED | OUTPATIENT
Start: 2022-03-24 | End: 2022-03-24

## 2022-03-24 RX ADMIN — SODIUM CHLORIDE, SODIUM LACTATE, POTASSIUM CHLORIDE, AND CALCIUM CHLORIDE 100 ML/HR: 600; 310; 30; 20 INJECTION, SOLUTION INTRAVENOUS at 09:38

## 2022-03-24 RX ADMIN — GLYCOPYRROLATE 0.6 MG: 0.2 INJECTION, SOLUTION INTRAMUSCULAR; INTRAVENOUS at 10:46

## 2022-03-24 RX ADMIN — LIDOCAINE HYDROCHLORIDE 60 MG: 20 INJECTION, SOLUTION EPIDURAL; INFILTRATION; INTRACAUDAL; PERINEURAL at 10:18

## 2022-03-24 RX ADMIN — DEXAMETHASONE SODIUM PHOSPHATE 4 MG: 4 INJECTION, SOLUTION INTRAMUSCULAR; INTRAVENOUS at 10:22

## 2022-03-24 RX ADMIN — FENTANYL CITRATE 50 MCG: 50 INJECTION INTRAMUSCULAR; INTRAVENOUS at 10:18

## 2022-03-24 RX ADMIN — HALOPERIDOL LACTATE 1 MG: 5 INJECTION, SOLUTION INTRAMUSCULAR at 11:15

## 2022-03-24 RX ADMIN — ONDANSETRON 4 MG: 2 INJECTION INTRAMUSCULAR; INTRAVENOUS at 11:07

## 2022-03-24 RX ADMIN — ONDANSETRON 4 MG: 2 INJECTION INTRAMUSCULAR; INTRAVENOUS at 10:22

## 2022-03-24 RX ADMIN — Medication 4 MG: at 10:46

## 2022-03-24 RX ADMIN — ROCURONIUM BROMIDE 40 MG: 10 INJECTION, SOLUTION INTRAVENOUS at 10:19

## 2022-03-24 RX ADMIN — LEVOFLOXACIN 500 MG: 5 INJECTION, SOLUTION INTRAVENOUS at 10:23

## 2022-03-24 RX ADMIN — FENTANYL CITRATE 50 MCG: 50 INJECTION INTRAMUSCULAR; INTRAVENOUS at 10:23

## 2022-03-24 RX ADMIN — PROPOFOL 150 MG: 10 INJECTION, EMULSION INTRAVENOUS at 10:18

## 2022-03-24 RX ADMIN — HYDROMORPHONE HYDROCHLORIDE 0.5 MG: 2 INJECTION, SOLUTION INTRAMUSCULAR; INTRAVENOUS; SUBCUTANEOUS at 11:25

## 2022-03-24 NOTE — PERIOP NOTES
Discharge instructions reviewed with patient and significant other, Tulio. Both verbalized understanding. Questions answered. No concerns. Papers with him. Unable to use e-signature pad d/t malfunction.

## 2022-03-24 NOTE — OP NOTES
[facility]  OPERATIVE REPORT    Name:  Lizzy Stanley  MR#:  [id number]  :  1975  ACCOUNT #:  [de-identified]  DATE OF SERVICE:  2022    PREOPERATIVE DIAGNOSES:  Left hydronephrosis and retained stent. POSTOPERATIVE DIAGNOSES:  Left hydronephrosis and retained stent. PROCEDURE PERFORMED:  Cystoscopy with left double-J stent removal, left ureteroscopy with extraction of retained stent, placement of new double-J ureteral stent on the left. SURGEON:  Gena Lewis MD    ASSISTANT:  Kacey Winters    ANESTHESIA:  General endotracheal.    COMPLICATIONS:  None. SPECIMENS REMOVED:  None. IMPLANTS:  Left 6-Niuean x 22 cm double-J ureteral stent. ESTIMATED BLOOD LOSS:  None. INDICATION FOR THE PROCEDURE:  The patient is a very pleasant 71-year-old female who has advanced adenocarcinoma. She had bilateral stents placed because of metastatic disease that had caused bilateral hydronephrosis. Her original left stent had migrated up the ureter and was unable to be removed at her last procedure. As a result, I placed an additional stent to allow that left ureter to dilate so that I could better access it with the ureteroscope. She presents today for removal of that retained stent. DETAILS OF THE PROCEDURE:  After informed consent was obtained, she was taken to the operating room 1 in the [place] [address]. After induction of general anesthesia and placement of an endotracheal tube, she was carefully positioned in low lithotomy position. Her genitalia were prepped and draped in the usual sterile fashion. We performed a time-out. She was given Levaquin 500 mg as a prophylactic antibiotic. I then inserted a 22-Niuean rigid cystoscope and drained her bladder. The right stent appeared to be in good position. The left stent that we placed a week and half ago was also in good position. It was grasped with a grasper and brought to the meatus.   I then placed a sensor wire through that stent and under fluoroscopic guidance, verified it was curled in the top portion of the collecting system. I then inserted a semi-rigid ureteroscope and was able to under direct vision drive it up the ureter following the wire. I then placed a second wire through the scope to help guide me as I guide into the mid and more proximal ureter. The stent had migrated further up into the proximal ureter. I was able to visualize it with the semi-rigid scope. At this time, I removed the wire within the scope and placed a ZeroTip nitinol basket. Unfortunately, I was not able to get the basket to have enough purchase on the end of the stent to remove it. At this point, I exchanged the basket for some ureteroscopic alligator grasper forceps. I was able to insert these into the open end of the stent and grasp it. It then was removed easily. Next, I backloaded the 22-Puerto Rican cystoscope over the safety wire. I then placed a 6-Puerto Rican x 22 cm double-J ureteral stent using fluoroscopic guidance and direct vision through the scope. We ensured a good curl in the upper portion of the collecting system as well as the bladder. The bladder was then drained completely and the scope was removed. The patient was awakened, extubated, and taken to the recovery room in stable condition. There were no known complications. DISPOSITION:  She will go home today. She will start her systematic treatments with Dr. Batista Figures team next week. I would like to see her back in approximately 3 months for discussion of whether or not to remove the stents or potentially exchange them for Tria longer lasting stents.       MD BEAU [email or web address]  D:  03/24/2022 11:06  T:  03/24/2022 14:34  JOB #:  [id number]

## 2022-03-24 NOTE — ANESTHESIA PREPROCEDURE EVALUATION
Anesthetic History   No history of anesthetic complications            Review of Systems / Medical History  Patient summary reviewed and pertinent labs reviewed    Pulmonary  Within defined limits                 Neuro/Psych   Within defined limits           Cardiovascular  Within defined limits                Exercise tolerance: >4 METS     GI/Hepatic/Renal     GERD: well controlled    Renal disease (Obstructive Hydronephrosis.)      Comments: S/p gastric sleeve Endo/Other        Cancer (Ovarian Cancer s/p surgical resection. Chemo to start next week.) and anemia     Other Findings              Physical Exam    Airway  Mallampati: I  TM Distance: > 6 cm  Neck ROM: normal range of motion   Mouth opening: Normal     Cardiovascular  Regular rate and rhythm,  S1 and S2 normal,  no murmur, click, rub, or gallop  Rhythm: regular  Rate: normal         Dental  No notable dental hx       Pulmonary  Breath sounds clear to auscultation               Abdominal  GI exam deferred       Other Findings            Anesthetic Plan    ASA: 2  Anesthesia type: general          Induction: Intravenous  Anesthetic plan and risks discussed with: Patient      LANE

## 2022-03-24 NOTE — DISCHARGE INSTRUCTIONS
Please call our office ([phone]) or return to ED if you experience fever > 101.5, severe pain, persistent nausea/vomiting, excessive bleeding, inability to urinate, or other concerns. Ureteral Stent Placement: What to Expect at [address] [place] [address]  A ureteral (say \"you-REE-ter-ul\") stent is a thin, hollow tube that is placed in the ureter to help urine pass from the kidney into the bladder. Ureters are the tubes that connect the kidneys to the bladder. You may have a small amount of blood in your urine for 1 to 3 days after the procedure. While the stent is in place, you may have to urinate more often, feel a sudden need to urinate, or feel like you cannot completely empty your bladder. You may feel some pain when you urinate or do strenuous activity. You also may notice a small amount of blood in your urine after strenuous activities. These side effects usually do not prevent people from doing their normal daily activities. You may have a string attached to your stent. Do not to pull the string unless the doctor tells you to. The doctor will use the string to pull out the stent when you no longer need it. After the procedure, urine may flow better from your kidneys to your bladder. A ureteral stent may be left in place for several days or for as long as several months. Your doctor will take it out when you no longer need it. Cystoscopy: What to Expect at [address] [place] [address]  A cystoscopy is a procedure that lets a doctor look inside of the bladder and the urethra. The urethra is the tube that carries urine from the bladder to outside the body. The doctor uses a thin, lighted tube called a cystoscope to look for kidney or bladder stones, tumors, bleeding, or infection. After the cystoscopy, your urethra may be sore at first, and it may burn when you urinate for the first few days after the procedure. You may feel the need to urinate more often, and your urine may be pink.  These symptoms should get better in 1 or 2 days. You will probably be able to go back to work or most of your usual activities in 1 or 2 days. How can you care for yourself at home? Activity  · Rest when you feel tired. Getting enough sleep will help you recover. · Try to walk each day. Start by walking a little more than you did the day before. Bit by bit, increase the amount you walk. Walking boosts blood flow and helps prevent pneumonia and constipation. · Avoid strenuous activities, such as bicycle riding, jogging, weight lifting, or aerobic exercise, until your doctor says it is okay. · Ask your doctor when you can drive again. · Most people are able to return to work within 1 or 2 days after the procedure. · You may shower and take baths as usual.  · Ask your doctor when it is okay for you to have sex. Diet  · You can eat your normal diet. If your stomach is upset, try bland, low-fat foods like plain rice, broiled chicken, toast, and yogurt. · Drink plenty of fluids (unless your doctor tells you not to). Medicines  · Take pain medicines exactly as directed. ¨ If the doctor gave you a prescription medicine for pain, take it as prescribed. ¨ If you are not taking a prescription pain medicine, ask your doctor if you can take an over-the-counter medicine. · If you think your pain medicine is making you sick to your stomach:  ¨ Take your medicine after meals (unless your doctor has told you not to). ¨ Ask your doctor for a different pain medicine. · If your doctor prescribed antibiotics, take them as directed. Do not stop taking them just because you feel better. You need to take the full course of antibiotics. Medication Interaction:  During your procedure you potentially received a medication or medications which may reduce the effectiveness of oral contraceptives.  Please consider other forms of contraception for 1 month following your procedure if you are currently using oral contraceptives as your primary form of birth control. In addition to this, we recommend continuing your oral contraceptive as prescribed, unless otherwise instructed by your physician, during this time. Follow-up care is a key part of your treatment and safety. Be sure to make and go to all appointments, and call your doctor if you are having problems. It's also a good idea to know your test results and keep a list of the medicines you take. When should you call for help? Call 911 anytime you think you may need emergency care. For example, call if:  · You passed out (lost consciousness). · You have severe trouble breathing. · You have sudden chest pain and shortness of breath, or you cough up blood. · You have severe belly pain. Call your doctor now or seek immediate medical care if:  · You are sick to your stomach or cannot keep fluids down. · Your urine is still red or you see blood clots after you have urinated several times. · You have trouble passing urine or stool, especially if you have pain or swelling in your lower belly. · You have signs of a blood clot, such as:  ¨ Pain in your calf, back of the knee, thigh, or groin. ¨ Redness and swelling in your leg or groin. · You develop a fever or severe chills. · You have pain in your back just below your rib cage. This is called flank pain. Watch closely for changes in your health, and be sure to contact your doctor if:  · A burning feeling is normal for a day or two after the test, but call if it does not get better. · You have a frequent urge to urinate but can pass only small amounts of urine. · It is normal for the urine to have a pinkish color for a few days after the test, but call if it does not get better or if your urine is cloudy, smells bad, or has pus in it.     After general anesthesia or intravenous sedation, for 24 hours or while taking prescription Narcotics:  · Limit your activities  · A responsible adult needs to be with you for the next 24 hours  · Do not drive and operate hazardous machinery  · Do not make important personal or business decisions  · Do not drink alcoholic beverages  · If you have not urinated within 8 hours after discharge, and you are experiencing discomfort from urinary retention, please go to the nearest ED. · If you have sleep apnea and have a CPAP machine, please use it for all naps and sleeping. · Please use caution when taking narcotics and any of your home medications that may cause drowsiness. *  Please give a list of your current medications to your Primary Care Provider. *  Please update this list whenever your medications are discontinued, doses are      changed, or new medications (including over-the-counter products) are added. *  Please carry medication information at all times in case of emergency situations. These are general instructions for a healthy lifestyle:  No smoking/ No tobacco products/ Avoid exposure to second hand smoke  Surgeon General's Warning:  Quitting smoking now greatly reduces serious risk to your health. Obesity, smoking, and sedentary lifestyle greatly increases your risk for illness  A healthy diet, regular physical exercise & weight monitoring are important for maintaining a healthy lifestyle    You may be retaining fluid if you have a history of heart failure or if you experience any of the following symptoms:  Weight gain of 3 pounds or more overnight or 5 pounds in a week, increased swelling in our hands or feet or shortness of breath while lying flat in bed. Please call your doctor as soon as you notice any of these symptoms; do not wait until your next office visit.

## 2022-03-24 NOTE — ANESTHESIA POSTPROCEDURE EVALUATION
Procedure(s):  CYSTOSCOPY LEFT URETEROSCOPY LEFT URETERAL STENT REMOVAL LEFT STENT PLACEMENT. general    Anesthesia Post Evaluation        Patient location during evaluation: PACU  Patient participation: complete - patient participated  Level of consciousness: awake  Pain management: satisfactory to patient  Airway patency: patent  Anesthetic complications: no  Cardiovascular status: hemodynamically stable  Respiratory status: spontaneous ventilation  Hydration status: euvolemic  Post anesthesia nausea and vomiting:  none      INITIAL Post-op Vital signs:   Vitals Value Taken Time   /76 03/24/22 1148   Temp 36.4 °C (97.5 °F) 03/24/22 1109   Pulse 57 03/24/22 1150   Resp 16 03/24/22 1135   SpO2 100 % 03/24/22 1150   Vitals shown include unvalidated device data.

## 2022-03-24 NOTE — PERIOP NOTES
11:12 AM  Celso Guadarrama called and notified of nausea after 8 mg zofran given in OR. Orders to give 1 mg IV haldol now. 11:38 AM  Report to Mekhi Estes RN.

## 2022-03-24 NOTE — BRIEF OP NOTE
Brief Postoperative Note    Patient: Oli William  YOB: 1975  MRN: [id number]    Date of Procedure: 3/24/2022     Pre-Op Diagnosis: Other hydronephrosis [N13.39]    Post-Op Diagnosis: retained ureteral stent      Procedure(s):  CYSTOSCOPY LEFT URETEROSCOPY LEFT URETERAL STENT EXCHANGE    Surgeon(s):  Keven Garcia MD    Surgical Assistant: None    Anesthesia: General     Estimated Blood Loss (mL): Minimal    Complications: None    Specimens: * No specimens in log *     Implants:   Implant Name Type Inv. Item Serial No.  Lot No. LRB No. Used Action   Beatriz Shan 6F 22CM -- [address] [id number]  [id number]  STENT Rivera Lighter 6F 22CM -- [address] [facility] [id number]  [facility]_ [id number] Left 1 Implanted       Drains: * No LDAs found *    Findings: left stent that was in bladder was removed; then able to remove retained stent with ureteroscopy and graspers; 6x22F JJ then replaced on the left.      Electronically Signed by Xin Perry MD on 3/24/2022 at 10:51 AM

## 2022-03-31 ENCOUNTER — HOSPITAL ENCOUNTER (OUTPATIENT)
Dept: LAB | Age: 47
Discharge: HOME | End: 2022-03-31
Payer: COMMERCIAL

## 2022-03-31 ENCOUNTER — PATIENT OUTREACH (OUTPATIENT)
Dept: CASE MANAGEMENT | Age: 47
End: 2022-03-31

## 2022-03-31 DIAGNOSIS — D50.0 IRON DEFICIENCY ANEMIA DUE TO CHRONIC BLOOD LOSS: ICD-10-CM

## 2022-03-31 DIAGNOSIS — C80.1 CARCINOMA OF UNKNOWN PRIMARY (HCC): ICD-10-CM

## 2022-03-31 LAB
ALBUMIN SERPL-MCNC: 3.8 G/DL (ref 3.5–5)
ALBUMIN/GLOB SERPL: 1 {RATIO} (ref 1.2–3.5)
ALP SERPL-CCNC: 266 U/L (ref 50–136)
ALT SERPL-CCNC: 199 U/L (ref 12–65)
ANION GAP SERPL CALC-SCNC: 6 MMOL/L (ref 7–16)
AST SERPL-CCNC: 132 U/L (ref 15–37)
BASOPHILS # BLD: 0 K/UL (ref 0–0.2)
BASOPHILS NFR BLD: 0 % (ref 0–2)
BILIRUB SERPL-MCNC: 0.3 MG/DL (ref 0.2–1.1)
BUN SERPL-MCNC: 9 MG/DL (ref 6–23)
CALCIUM SERPL-MCNC: 9.3 MG/DL (ref 8.3–10.4)
CHLORIDE SERPL-SCNC: 103 MMOL/L (ref 98–107)
CO2 SERPL-SCNC: 27 MMOL/L (ref 21–32)
CREAT SERPL-MCNC: 0.8 MG/DL (ref 0.6–1)
DIFFERENTIAL METHOD BLD: ABNORMAL
EOSINOPHIL # BLD: 0 K/UL (ref 0–0.8)
EOSINOPHIL NFR BLD: 0 % (ref 0.5–7.8)
ERYTHROCYTE [DISTWIDTH] IN BLOOD BY AUTOMATED COUNT: 13.2 % (ref 11.9–14.6)
FERRITIN SERPL-MCNC: 135 NG/ML (ref 8–388)
GLOBULIN SER CALC-MCNC: 3.8 G/DL (ref 2.3–3.5)
GLUCOSE SERPL-MCNC: 130 MG/DL (ref 65–100)
HCT VFR BLD AUTO: 35.6 % (ref 35.8–46.3)
HGB BLD-MCNC: 10.9 G/DL (ref 11.7–15.4)
IMM GRANULOCYTES # BLD AUTO: 0 K/UL (ref 0–0.5)
IMM GRANULOCYTES NFR BLD AUTO: 0 % (ref 0–5)
IRON SATN MFR SERPL: 21 %
IRON SERPL-MCNC: 58 UG/DL (ref 35–150)
LYMPHOCYTES # BLD: 1.1 K/UL (ref 0.5–4.6)
LYMPHOCYTES NFR BLD: 11 % (ref 13–44)
MAGNESIUM SERPL-MCNC: 2 MG/DL (ref 1.8–2.4)
MCH RBC QN AUTO: 27.5 PG (ref 26.1–32.9)
MCHC RBC AUTO-ENTMCNC: 30.6 G/DL (ref 31.4–35)
MCV RBC AUTO: 89.9 FL (ref 79.6–97.8)
MONOCYTES # BLD: 0.7 K/UL (ref 0.1–1.3)
MONOCYTES NFR BLD: 8 % (ref 4–12)
NEUTS SEG # BLD: 7.5 K/UL (ref 1.7–8.2)
NEUTS SEG NFR BLD: 81 % (ref 43–78)
NRBC # BLD: 0 K/UL (ref 0–0.2)
PLATELET # BLD AUTO: 238 K/UL (ref 150–450)
PMV BLD AUTO: 10.9 FL (ref 9.4–12.3)
POTASSIUM SERPL-SCNC: 4 MMOL/L (ref 3.5–5.1)
PROT SERPL-MCNC: 7.6 G/DL (ref 6.3–8.2)
PROT UR-MCNC: 30 MG/DL
RBC # BLD AUTO: 3.96 M/UL (ref 4.05–5.2)
SODIUM SERPL-SCNC: 136 MMOL/L (ref 136–145)
TIBC SERPL-MCNC: 273 UG/DL (ref 250–450)
WBC # BLD AUTO: 9.3 K/UL (ref 4.3–11.1)

## 2022-03-31 PROCEDURE — 83540 ASSAY OF IRON: CPT

## 2022-03-31 PROCEDURE — 36415 COLL VENOUS BLD VENIPUNCTURE: CPT

## 2022-03-31 PROCEDURE — 83735 ASSAY OF MAGNESIUM: CPT

## 2022-03-31 PROCEDURE — 82728 ASSAY OF FERRITIN: CPT

## 2022-03-31 PROCEDURE — 84156 ASSAY OF PROTEIN URINE: CPT

## 2022-03-31 PROCEDURE — 80053 COMPREHEN METABOLIC PANEL: CPT

## 2022-03-31 PROCEDURE — 85025 COMPLETE CBC W/AUTO DIFF WBC: CPT

## 2022-03-31 RX ORDER — EPINEPHRINE 1 MG/ML
0.3 INJECTION, SOLUTION, CONCENTRATE INTRAVENOUS AS NEEDED
Status: CANCELLED | OUTPATIENT
Start: 2022-04-01

## 2022-03-31 RX ORDER — SODIUM CHLORIDE 0.9 % (FLUSH) 0.9 %
10 SYRINGE (ML) INJECTION AS NEEDED
Status: CANCELLED | OUTPATIENT
Start: 2022-04-03

## 2022-03-31 RX ORDER — HEPARIN 100 UNIT/ML
300-500 SYRINGE INTRAVENOUS AS NEEDED
Status: CANCELLED
Start: 2022-04-01

## 2022-03-31 RX ORDER — DIPHENHYDRAMINE HYDROCHLORIDE 50 MG/ML
25 INJECTION, SOLUTION INTRAMUSCULAR; INTRAVENOUS AS NEEDED
Status: CANCELLED
Start: 2022-04-01

## 2022-03-31 RX ORDER — ACETAMINOPHEN 325 MG/1
650 TABLET ORAL AS NEEDED
Status: CANCELLED
Start: 2022-04-01

## 2022-03-31 RX ORDER — ALBUTEROL SULFATE 0.83 MG/ML
2.5 SOLUTION RESPIRATORY (INHALATION) AS NEEDED
Status: CANCELLED
Start: 2022-04-01

## 2022-03-31 RX ORDER — SODIUM CHLORIDE 9 MG/ML
10 INJECTION INTRAMUSCULAR; INTRAVENOUS; SUBCUTANEOUS AS NEEDED
Status: CANCELLED | OUTPATIENT
Start: 2022-04-01

## 2022-03-31 RX ORDER — SODIUM CHLORIDE 9 MG/ML
10 INJECTION INTRAMUSCULAR; INTRAVENOUS; SUBCUTANEOUS AS NEEDED
Status: CANCELLED | OUTPATIENT
Start: 2022-04-03

## 2022-03-31 RX ORDER — HEPARIN 100 UNIT/ML
300-500 SYRINGE INTRAVENOUS AS NEEDED
Status: CANCELLED
Start: 2022-04-03

## 2022-04-01 ENCOUNTER — HOSPITAL ENCOUNTER (OUTPATIENT)
Dept: INFUSION THERAPY | Age: 47
Discharge: HOME | End: 2022-04-01
Payer: COMMERCIAL

## 2022-04-01 VITALS
OXYGEN SATURATION: 99 % | BODY MASS INDEX: 20.01 KG/M2 | TEMPERATURE: 98.4 F | DIASTOLIC BLOOD PRESSURE: 72 MMHG | WEIGHT: 116.6 LBS | SYSTOLIC BLOOD PRESSURE: 125 MMHG | HEART RATE: 88 BPM | RESPIRATION RATE: 16 BRPM

## 2022-04-01 DIAGNOSIS — C80.1 CARCINOMA OF UNKNOWN PRIMARY (HCC): Primary | ICD-10-CM

## 2022-04-01 LAB — HBV SURFACE AG SER QL: NONREACTIVE

## 2022-04-01 PROCEDURE — 96411 CHEMO IV PUSH ADDL DRUG: CPT

## 2022-04-01 PROCEDURE — 96375 TX/PRO/DX INJ NEW DRUG ADDON: CPT

## 2022-04-01 PROCEDURE — 86704 HEP B CORE ANTIBODY TOTAL: CPT

## 2022-04-01 PROCEDURE — 74011000258 HC RX REV CODE- 258: Performed by: INTERNAL MEDICINE

## 2022-04-01 PROCEDURE — 96415 CHEMO IV INFUSION ADDL HR: CPT

## 2022-04-01 PROCEDURE — 74011000250 HC RX REV CODE- 250: Performed by: INTERNAL MEDICINE

## 2022-04-01 PROCEDURE — 96413 CHEMO IV INFUSION 1 HR: CPT

## 2022-04-01 PROCEDURE — 96368 THER/DIAG CONCURRENT INF: CPT

## 2022-04-01 PROCEDURE — 87340 HEPATITIS B SURFACE AG IA: CPT

## 2022-04-01 PROCEDURE — 74011250636 HC RX REV CODE- 250/636: Performed by: INTERNAL MEDICINE

## 2022-04-01 PROCEDURE — G0498 CHEMO EXTEND IV INFUS W/PUMP: HCPCS

## 2022-04-01 RX ORDER — DIPHENHYDRAMINE HYDROCHLORIDE 50 MG/ML
50 INJECTION, SOLUTION INTRAMUSCULAR; INTRAVENOUS AS NEEDED
Status: DISPENSED | OUTPATIENT
Start: 2022-04-01 | End: 2022-04-01

## 2022-04-01 RX ORDER — ONDANSETRON 2 MG/ML
8 INJECTION INTRAMUSCULAR; INTRAVENOUS AS NEEDED
Status: DISPENSED | OUTPATIENT
Start: 2022-04-01 | End: 2022-04-01

## 2022-04-01 RX ORDER — SODIUM CHLORIDE 9 MG/ML
25 INJECTION, SOLUTION INTRAVENOUS CONTINUOUS
Status: ACTIVE | OUTPATIENT
Start: 2022-04-01 | End: 2022-04-01

## 2022-04-01 RX ORDER — DEXTROSE MONOHYDRATE 50 MG/ML
25 INJECTION, SOLUTION INTRAVENOUS CONTINUOUS
Status: ACTIVE | OUTPATIENT
Start: 2022-04-01 | End: 2022-04-01

## 2022-04-01 RX ORDER — FLUOROURACIL 50 MG/ML
400 INJECTION, SOLUTION INTRAVENOUS ONCE
Status: COMPLETED | OUTPATIENT
Start: 2022-04-01 | End: 2022-04-01

## 2022-04-01 RX ORDER — ONDANSETRON 2 MG/ML
8 INJECTION INTRAMUSCULAR; INTRAVENOUS ONCE
Status: COMPLETED | OUTPATIENT
Start: 2022-04-01 | End: 2022-04-01

## 2022-04-01 RX ORDER — SODIUM CHLORIDE 0.9 % (FLUSH) 0.9 %
10 SYRINGE (ML) INJECTION AS NEEDED
Status: ACTIVE | OUTPATIENT
Start: 2022-04-01 | End: 2022-04-01

## 2022-04-01 RX ORDER — HYDROCORTISONE SODIUM SUCCINATE 100 MG/2ML
100 INJECTION, POWDER, FOR SOLUTION INTRAMUSCULAR; INTRAVENOUS AS NEEDED
Status: DISPENSED | OUTPATIENT
Start: 2022-04-01 | End: 2022-04-01

## 2022-04-01 RX ADMIN — DEXTROSE MONOHYDRATE 25 ML/HR: 5 INJECTION, SOLUTION INTRAVENOUS at 09:40

## 2022-04-01 RX ADMIN — DEXAMETHASONE SODIUM PHOSPHATE 12 MG: 4 INJECTION, SOLUTION INTRAMUSCULAR; INTRAVENOUS at 10:08

## 2022-04-01 RX ADMIN — SODIUM CHLORIDE, PRESERVATIVE FREE 10 ML: 5 INJECTION INTRAVENOUS at 09:40

## 2022-04-01 RX ADMIN — LEUCOVORIN CALCIUM 636 MG: 350 INJECTION, POWDER, LYOPHILIZED, FOR SOLUTION INTRAMUSCULAR; INTRAVENOUS at 10:36

## 2022-04-01 RX ADMIN — ONDANSETRON 8 MG: 2 INJECTION INTRAMUSCULAR; INTRAVENOUS at 10:06

## 2022-04-01 RX ADMIN — FLUOROURACIL 3816 MG: 50 INJECTION, SOLUTION INTRAVENOUS at 12:57

## 2022-04-01 RX ADMIN — FLUOROURACIL 636 MG: 50 INJECTION, SOLUTION INTRAVENOUS at 12:53

## 2022-04-01 RX ADMIN — OXALIPLATIN 135 MG: 5 INJECTION, SOLUTION INTRAVENOUS at 10:36

## 2022-04-01 NOTE — PROGRESS NOTES
Clinical Social Work Note    Date of Service: 4/1/2022    Length of Service: 15 minutes    Patient Diagnosis: carcinoma    Referral Source: RN Navigator [place]    Reason for Visit: introduction to services    Clinical Note: SW met with pt and boyfriend in infusion to introduce supportive services. Pt did not identify any particular issues today. She confirmed she has a 15 yo daughter who is aware of pt's diagnosis. SW offered to follow up with email with resources and pt verbalized agreement, confirming email address. SW sent links for [employer]. No other acute needs noted. Next Steps: SW provided pt with SW contact information and encouraged pt to call should any needs arise. Pt verbalized understanding.  SW intends to follow up.    3 most recent [facility] Screens 3/31/2022 3/16/2022 3/9/2022   Little interest or pleasure in doing things Not at all Not at all Not at all   Feeling down, depressed, irritable, or hopeless Not at all Not at all Not at all   Total Score PHQ 2 0 0 0   Trouble falling or staying asleep, or sleeping too much - - -   Feeling tired or having little energy - - -   Poor appetite, weight loss, or overeating - - -   Feeling bad about yourself - or that you are a failure or have let yourself or your family down - - -   Trouble concentrating on things such as school, work, reading, or watching TV - - -   Moving or speaking so slowly that other people could have noticed; or the opposite being so fidgety that others notice - - -   Thoughts of being better off dead, or hurting yourself in some way - - -   PHQ 9 Score - - -         Electronically Signed By: Igor Sanders LMSW

## 2022-04-01 NOTE — PROGRESS NOTES
3/31/22 saw pt today with Renetta Quiroz NP for pre chemo cycle 1 FOLFOX. Will hold avastin until after appt with Dr. Luiz Bautista concerning gallbladder. See pt instructions for symptom management. Lengthy discussion about nutrition, Issa Sat here for visit. Provided opportunity to ask questions and all were discussed. Proceed to infusion. Follow up in 2 weeks. Navigation will continue to follow.

## 2022-04-01 NOTE — PROGRESS NOTES
Arrived to the [facility]. Port accessed and FOLFOX completed. Patient tolerated well. Provided spill kit and home education. Any issues or concerns during appointment: none. Patient aware of next infusion appointment on 4/3/22 at 1:30 PM  Patient instructed to call provider with temperature of 100.4 or greater or nausea/vomiting/ diarrhea or pain not controlled by medications  Discharged home ambulatory with .

## 2022-04-02 LAB — HBV CORE AB SERPL QL IA: NEGATIVE

## 2022-04-03 ENCOUNTER — HOSPITAL ENCOUNTER (OUTPATIENT)
Dept: INFUSION THERAPY | Age: 47
Discharge: HOME | End: 2022-04-03
Payer: COMMERCIAL

## 2022-04-03 VITALS
TEMPERATURE: 98.1 F | OXYGEN SATURATION: 98 % | SYSTOLIC BLOOD PRESSURE: 116 MMHG | RESPIRATION RATE: 18 BRPM | DIASTOLIC BLOOD PRESSURE: 79 MMHG | HEART RATE: 81 BPM

## 2022-04-03 DIAGNOSIS — D50.0 IRON DEFICIENCY ANEMIA DUE TO CHRONIC BLOOD LOSS: Primary | ICD-10-CM

## 2022-04-03 DIAGNOSIS — C80.1 CARCINOMA OF UNKNOWN PRIMARY (HCC): ICD-10-CM

## 2022-04-03 PROCEDURE — 74011000250 HC RX REV CODE- 250: Performed by: INTERNAL MEDICINE

## 2022-04-03 PROCEDURE — 96360 HYDRATION IV INFUSION INIT: CPT

## 2022-04-03 PROCEDURE — 74011250636 HC RX REV CODE- 250/636: Performed by: INTERNAL MEDICINE

## 2022-04-03 RX ORDER — SODIUM CHLORIDE 0.9 % (FLUSH) 0.9 %
10 SYRINGE (ML) INJECTION AS NEEDED
Status: DISCONTINUED | OUTPATIENT
Start: 2022-04-03 | End: 2022-04-04 | Stop reason: HOSPADM

## 2022-04-03 RX ADMIN — SODIUM CHLORIDE, PRESERVATIVE FREE 10 ML: 5 INJECTION INTRAVENOUS at 13:30

## 2022-04-03 RX ADMIN — SODIUM CHLORIDE, PRESERVATIVE FREE 10 ML: 5 INJECTION INTRAVENOUS at 12:25

## 2022-04-03 RX ADMIN — SODIUM CHLORIDE 1000 ML: 9 INJECTION, SOLUTION INTRAVENOUS at 12:30

## 2022-04-03 NOTE — PROGRESS NOTES
Arrived to the [facility]. Assessment completed. Elastomeric pump detached, positive blood return noted and 1L of NS infused at the request of the patient. Patient tolerated well. Any issues or concerns during appointment: none. Patient aware of next infusion appointment on 04/15/2022 at 0900. Patient instructed to call provider with temperature of 100.4 or greater or nausea/vomiting/ diarrhea or pain not controlled by medications. Discharged ambulatory.

## 2022-04-14 ENCOUNTER — HOSPITAL ENCOUNTER (OUTPATIENT)
Dept: LAB | Age: 47
Discharge: HOME | End: 2022-04-14
Payer: COMMERCIAL

## 2022-04-14 ENCOUNTER — PATIENT OUTREACH (OUTPATIENT)
Dept: CASE MANAGEMENT | Age: 47
End: 2022-04-14

## 2022-04-14 DIAGNOSIS — C80.1 CARCINOMA OF UNKNOWN PRIMARY (HCC): ICD-10-CM

## 2022-04-14 LAB
ALBUMIN SERPL-MCNC: 3.3 G/DL (ref 3.5–5)
ALBUMIN/GLOB SERPL: 1 {RATIO} (ref 1.2–3.5)
ALP SERPL-CCNC: 91 U/L (ref 50–136)
ALT SERPL-CCNC: 20 U/L (ref 12–65)
ANION GAP SERPL CALC-SCNC: 4 MMOL/L (ref 7–16)
AST SERPL-CCNC: 14 U/L (ref 15–37)
BASOPHILS # BLD: 0 K/UL (ref 0–0.2)
BASOPHILS NFR BLD: 0 % (ref 0–2)
BILIRUB SERPL-MCNC: 0.3 MG/DL (ref 0.2–1.1)
BUN SERPL-MCNC: 13 MG/DL (ref 6–23)
CALCIUM SERPL-MCNC: 8.6 MG/DL (ref 8.3–10.4)
CHLORIDE SERPL-SCNC: 106 MMOL/L (ref 98–107)
CO2 SERPL-SCNC: 31 MMOL/L (ref 21–32)
CREAT SERPL-MCNC: 0.6 MG/DL (ref 0.6–1)
DIFFERENTIAL METHOD BLD: ABNORMAL
EOSINOPHIL # BLD: 0.1 K/UL (ref 0–0.8)
EOSINOPHIL NFR BLD: 1 % (ref 0.5–7.8)
ERYTHROCYTE [DISTWIDTH] IN BLOOD BY AUTOMATED COUNT: 13.8 % (ref 11.9–14.6)
GLOBULIN SER CALC-MCNC: 3.3 G/DL (ref 2.3–3.5)
GLUCOSE SERPL-MCNC: 106 MG/DL (ref 65–100)
HCT VFR BLD AUTO: 31.5 % (ref 35.8–46.3)
HGB BLD-MCNC: 9.5 G/DL (ref 11.7–15.4)
IMM GRANULOCYTES # BLD AUTO: 0 K/UL (ref 0–0.5)
IMM GRANULOCYTES NFR BLD AUTO: 0 % (ref 0–5)
LYMPHOCYTES # BLD: 1.4 K/UL (ref 0.5–4.6)
LYMPHOCYTES NFR BLD: 20 % (ref 13–44)
MAGNESIUM SERPL-MCNC: 1.9 MG/DL (ref 1.8–2.4)
MCH RBC QN AUTO: 27.3 PG (ref 26.1–32.9)
MCHC RBC AUTO-ENTMCNC: 30.2 G/DL (ref 31.4–35)
MCV RBC AUTO: 90.5 FL (ref 79.6–97.8)
MONOCYTES # BLD: 0.7 K/UL (ref 0.1–1.3)
MONOCYTES NFR BLD: 11 % (ref 4–12)
NEUTS SEG # BLD: 4.6 K/UL (ref 1.7–8.2)
NEUTS SEG NFR BLD: 68 % (ref 43–78)
NRBC # BLD: 0 K/UL (ref 0–0.2)
PLATELET # BLD AUTO: 166 K/UL (ref 150–450)
PMV BLD AUTO: 9.6 FL (ref 9.4–12.3)
POTASSIUM SERPL-SCNC: 2.9 MMOL/L (ref 3.5–5.1)
PROT SERPL-MCNC: 6.6 G/DL (ref 6.3–8.2)
PROT UR-MCNC: 14 MG/DL
RBC # BLD AUTO: 3.48 M/UL (ref 4.05–5.2)
SODIUM SERPL-SCNC: 141 MMOL/L (ref 136–145)
WBC # BLD AUTO: 6.9 K/UL (ref 4.3–11.1)

## 2022-04-14 PROCEDURE — 84156 ASSAY OF PROTEIN URINE: CPT

## 2022-04-14 PROCEDURE — 80053 COMPREHEN METABOLIC PANEL: CPT

## 2022-04-14 PROCEDURE — 36415 COLL VENOUS BLD VENIPUNCTURE: CPT

## 2022-04-14 PROCEDURE — 85025 COMPLETE CBC W/AUTO DIFF WBC: CPT

## 2022-04-14 PROCEDURE — 83735 ASSAY OF MAGNESIUM: CPT

## 2022-04-14 NOTE — PROGRESS NOTES
4/14/22 saw pt today with Dr. Luciano Pathak for pre chemo cycle 2 FOLFOX/avastin being added today. She tolerated cycle 1 well. Dunia Johnson is here to discuss PO intake. Infusion tomorrow. Follow up in 2 weeks. Encouraged to call with any concerns. Navigation will continue to follow.

## 2022-04-15 ENCOUNTER — HOSPITAL ENCOUNTER (OUTPATIENT)
Dept: INFUSION THERAPY | Age: 47
Discharge: HOME | End: 2022-04-15
Payer: COMMERCIAL

## 2022-04-15 VITALS
RESPIRATION RATE: 16 BRPM | WEIGHT: 119.6 LBS | SYSTOLIC BLOOD PRESSURE: 135 MMHG | TEMPERATURE: 98 F | BODY MASS INDEX: 20.53 KG/M2 | DIASTOLIC BLOOD PRESSURE: 87 MMHG | HEART RATE: 71 BPM | OXYGEN SATURATION: 100 %

## 2022-04-15 DIAGNOSIS — C80.1 CARCINOMA OF UNKNOWN PRIMARY (HCC): Primary | ICD-10-CM

## 2022-04-15 PROCEDURE — 74011000258 HC RX REV CODE- 258: Performed by: INTERNAL MEDICINE

## 2022-04-15 PROCEDURE — 96417 CHEMO IV INFUS EACH ADDL SEQ: CPT

## 2022-04-15 PROCEDURE — 96368 THER/DIAG CONCURRENT INF: CPT

## 2022-04-15 PROCEDURE — 74011000250 HC RX REV CODE- 250: Performed by: INTERNAL MEDICINE

## 2022-04-15 PROCEDURE — 96411 CHEMO IV PUSH ADDL DRUG: CPT

## 2022-04-15 PROCEDURE — 74011250636 HC RX REV CODE- 250/636: Performed by: INTERNAL MEDICINE

## 2022-04-15 PROCEDURE — G0498 CHEMO EXTEND IV INFUS W/PUMP: HCPCS

## 2022-04-15 PROCEDURE — 96413 CHEMO IV INFUSION 1 HR: CPT

## 2022-04-15 PROCEDURE — 96375 TX/PRO/DX INJ NEW DRUG ADDON: CPT

## 2022-04-15 PROCEDURE — 96415 CHEMO IV INFUSION ADDL HR: CPT

## 2022-04-15 RX ORDER — SODIUM CHLORIDE 0.9 % (FLUSH) 0.9 %
10 SYRINGE (ML) INJECTION AS NEEDED
Status: ACTIVE | OUTPATIENT
Start: 2022-04-15 | End: 2022-04-15

## 2022-04-15 RX ORDER — DEXTROSE MONOHYDRATE 50 MG/ML
25 INJECTION, SOLUTION INTRAVENOUS CONTINUOUS
Status: ACTIVE | OUTPATIENT
Start: 2022-04-15 | End: 2022-04-15

## 2022-04-15 RX ORDER — FLUOROURACIL 50 MG/ML
400 INJECTION, SOLUTION INTRAVENOUS ONCE
Status: COMPLETED | OUTPATIENT
Start: 2022-04-15 | End: 2022-04-15

## 2022-04-15 RX ORDER — SODIUM CHLORIDE 9 MG/ML
25 INJECTION, SOLUTION INTRAVENOUS CONTINUOUS
Status: ACTIVE | OUTPATIENT
Start: 2022-04-15 | End: 2022-04-15

## 2022-04-15 RX ORDER — ONDANSETRON 2 MG/ML
8 INJECTION INTRAMUSCULAR; INTRAVENOUS ONCE
Status: COMPLETED | OUTPATIENT
Start: 2022-04-15 | End: 2022-04-15

## 2022-04-15 RX ADMIN — ONDANSETRON 8 MG: 2 INJECTION INTRAMUSCULAR; INTRAVENOUS at 10:33

## 2022-04-15 RX ADMIN — DEXTROSE MONOHYDRATE 25 ML/HR: 5 INJECTION, SOLUTION INTRAVENOUS at 10:33

## 2022-04-15 RX ADMIN — SODIUM CHLORIDE, PRESERVATIVE FREE 10 ML: 5 INJECTION INTRAVENOUS at 13:10

## 2022-04-15 RX ADMIN — SODIUM CHLORIDE 25 ML/HR: 900 INJECTION, SOLUTION INTRAVENOUS at 09:15

## 2022-04-15 RX ADMIN — FLUOROURACIL 636 MG: 50 INJECTION, SOLUTION INTRAVENOUS at 13:11

## 2022-04-15 RX ADMIN — SODIUM CHLORIDE 280 MG: 9 INJECTION, SOLUTION INTRAVENOUS at 09:51

## 2022-04-15 RX ADMIN — SODIUM CHLORIDE, PRESERVATIVE FREE 10 ML: 5 INJECTION INTRAVENOUS at 09:15

## 2022-04-15 RX ADMIN — LEUCOVORIN CALCIUM 636 MG: 350 INJECTION, POWDER, LYOPHILIZED, FOR SOLUTION INTRAMUSCULAR; INTRAVENOUS at 11:03

## 2022-04-15 RX ADMIN — FLUOROURACIL 3816 MG: 50 INJECTION, SOLUTION INTRAVENOUS at 13:16

## 2022-04-15 RX ADMIN — OXALIPLATIN 135 MG: 5 INJECTION, SOLUTION INTRAVENOUS at 11:03

## 2022-04-15 RX ADMIN — DEXAMETHASONE SODIUM PHOSPHATE 12 MG: 4 INJECTION, SOLUTION INTRAMUSCULAR; INTRAVENOUS at 10:34

## 2022-04-15 NOTE — ADDENDUM NOTE
Encounter addended by: Ramon Meyer [facility] on: 4/15/2022 9:24 AM   Actions taken: iJoe created or edited

## 2022-04-15 NOTE — PROGRESS NOTES
Arrived to the [facility]. Port accessed, positive blood return noted and Avastin and FOLFOX completed. Patient tolerated well. Any issues or concerns during appointment: Low potassium noted, patient stated she was to start home oral potassium. Denied muscle cramps or increased diarrhea/vomiting. Educated her to report any changes to MD. She verbalized understanding. Patient aware of next infusion appointment on 4/17/22 at 2:30 PM. She will arrive at 11:30 AM for pump disconnect. Patient instructed to call provider with temperature of 100.4 or greater or nausea/vomiting/ diarrhea or pain not controlled by medications  Discharged home ambulatory with .

## 2022-04-17 ENCOUNTER — HOSPITAL ENCOUNTER (OUTPATIENT)
Dept: INFUSION THERAPY | Age: 47
Discharge: HOME | End: 2022-04-17
Payer: COMMERCIAL

## 2022-04-17 VITALS
TEMPERATURE: 98 F | DIASTOLIC BLOOD PRESSURE: 77 MMHG | OXYGEN SATURATION: 99 % | RESPIRATION RATE: 18 BRPM | SYSTOLIC BLOOD PRESSURE: 132 MMHG | HEART RATE: 66 BPM

## 2022-04-17 DIAGNOSIS — C80.1 CARCINOMA OF UNKNOWN PRIMARY (HCC): Primary | ICD-10-CM

## 2022-04-17 DIAGNOSIS — D50.0 IRON DEFICIENCY ANEMIA DUE TO CHRONIC BLOOD LOSS: ICD-10-CM

## 2022-04-17 PROCEDURE — 74011000250 HC RX REV CODE- 250: Performed by: INTERNAL MEDICINE

## 2022-04-17 PROCEDURE — 74011250636 HC RX REV CODE- 250/636: Performed by: INTERNAL MEDICINE

## 2022-04-17 PROCEDURE — 99211 OFF/OP EST MAY X REQ PHY/QHP: CPT

## 2022-04-17 RX ORDER — SODIUM CHLORIDE 0.9 % (FLUSH) 0.9 %
10 SYRINGE (ML) INJECTION AS NEEDED
Status: ACTIVE | OUTPATIENT
Start: 2022-04-17 | End: 2022-04-17

## 2022-04-17 RX ADMIN — SODIUM CHLORIDE 500 ML: 9 INJECTION, SOLUTION INTRAVENOUS at 11:35

## 2022-04-17 RX ADMIN — SODIUM CHLORIDE, PRESERVATIVE FREE 10 ML: 5 INJECTION INTRAVENOUS at 11:35

## 2022-04-17 RX ADMIN — SODIUM CHLORIDE, PRESERVATIVE FREE 10 ML: 5 INJECTION INTRAVENOUS at 12:05

## 2022-04-17 NOTE — PROGRESS NOTES
Arrived to the [facility]. D/C pump completed. 500ml NS bolus completed, per patient request. Patient tolerated well. Any issues or concerns during appointment: no.  Patient aware of next infusion appointment on 4/29/2022 (date) at 5 am (time). Patient aware of next lab and [facility] office visit on 4/28/2022 (date) at 11:30 am (time). Patient instructed to call provider with temperature of 100.4 or greater or nausea/vomiting/ diarrhea or pain not controlled by medications  Discharged ambulatory with family member.

## 2022-04-28 ENCOUNTER — PATIENT OUTREACH (OUTPATIENT)
Dept: CASE MANAGEMENT | Age: 47
End: 2022-04-28

## 2022-04-28 ENCOUNTER — HOSPITAL ENCOUNTER (OUTPATIENT)
Dept: LAB | Age: 47
Discharge: HOME | End: 2022-04-28
Payer: COMMERCIAL

## 2022-04-28 DIAGNOSIS — C80.1 CARCINOMA OF UNKNOWN PRIMARY (HCC): ICD-10-CM

## 2022-04-28 LAB
ALBUMIN SERPL-MCNC: 3.6 G/DL (ref 3.5–5)
ALBUMIN/GLOB SERPL: 1.1 {RATIO} (ref 1.2–3.5)
ALP SERPL-CCNC: 77 U/L (ref 50–136)
ALT SERPL-CCNC: 37 U/L (ref 12–65)
ANION GAP SERPL CALC-SCNC: 3 MMOL/L (ref 7–16)
AST SERPL-CCNC: 28 U/L (ref 15–37)
BASOPHILS # BLD: 0 K/UL (ref 0–0.2)
BASOPHILS NFR BLD: 0 % (ref 0–2)
BILIRUB SERPL-MCNC: 0.4 MG/DL (ref 0.2–1.1)
BUN SERPL-MCNC: 13 MG/DL (ref 6–23)
CALCIUM SERPL-MCNC: 9 MG/DL (ref 8.3–10.4)
CHLORIDE SERPL-SCNC: 104 MMOL/L (ref 98–107)
CO2 SERPL-SCNC: 30 MMOL/L (ref 21–32)
CREAT SERPL-MCNC: 0.7 MG/DL (ref 0.6–1)
DIFFERENTIAL METHOD BLD: ABNORMAL
EOSINOPHIL # BLD: 0.1 K/UL (ref 0–0.8)
EOSINOPHIL NFR BLD: 1 % (ref 0.5–7.8)
ERYTHROCYTE [DISTWIDTH] IN BLOOD BY AUTOMATED COUNT: 15.6 % (ref 11.9–14.6)
GLOBULIN SER CALC-MCNC: 3.4 G/DL (ref 2.3–3.5)
GLUCOSE SERPL-MCNC: 113 MG/DL (ref 65–100)
HCT VFR BLD AUTO: 35.3 % (ref 35.8–46.3)
HGB BLD-MCNC: 10.8 G/DL (ref 11.7–15.4)
IMM GRANULOCYTES # BLD AUTO: 0 K/UL (ref 0–0.5)
IMM GRANULOCYTES NFR BLD AUTO: 0 % (ref 0–5)
LYMPHOCYTES # BLD: 1.5 K/UL (ref 0.5–4.6)
LYMPHOCYTES NFR BLD: 24 % (ref 13–44)
MAGNESIUM SERPL-MCNC: 2 MG/DL (ref 1.8–2.4)
MCH RBC QN AUTO: 28.4 PG (ref 26.1–32.9)
MCHC RBC AUTO-ENTMCNC: 30.6 G/DL (ref 31.4–35)
MCV RBC AUTO: 92.9 FL (ref 79.6–97.8)
MONOCYTES # BLD: 0.8 K/UL (ref 0.1–1.3)
MONOCYTES NFR BLD: 13 % (ref 4–12)
NEUTS SEG # BLD: 3.9 K/UL (ref 1.7–8.2)
NEUTS SEG NFR BLD: 62 % (ref 43–78)
NRBC # BLD: 0 K/UL (ref 0–0.2)
PLATELET # BLD AUTO: 146 K/UL (ref 150–450)
PMV BLD AUTO: 9.6 FL (ref 9.4–12.3)
POTASSIUM SERPL-SCNC: 4.4 MMOL/L (ref 3.5–5.1)
PROT SERPL-MCNC: 7 G/DL (ref 6.3–8.2)
PROT UR-MCNC: 10 MG/DL
RBC # BLD AUTO: 3.8 M/UL (ref 4.05–5.2)
SODIUM SERPL-SCNC: 137 MMOL/L (ref 136–145)
WBC # BLD AUTO: 6.3 K/UL (ref 4.3–11.1)

## 2022-04-28 PROCEDURE — 85025 COMPLETE CBC W/AUTO DIFF WBC: CPT

## 2022-04-28 PROCEDURE — 36415 COLL VENOUS BLD VENIPUNCTURE: CPT

## 2022-04-28 PROCEDURE — 83735 ASSAY OF MAGNESIUM: CPT

## 2022-04-28 PROCEDURE — 80053 COMPREHEN METABOLIC PANEL: CPT

## 2022-04-28 PROCEDURE — 84156 ASSAY OF PROTEIN URINE: CPT

## 2022-04-28 NOTE — PROGRESS NOTES
4/28/22 saw pt today with Arminda Ng NP for pre chemo cycle 3 FOLFOX/avastin. She is tolerating chemo well. Denies any issues. Infusion tomorrow. Encouraged to call with any concerns. Navigation will continue to follow.

## 2022-04-29 ENCOUNTER — HOSPITAL ENCOUNTER (OUTPATIENT)
Dept: INFUSION THERAPY | Age: 47
Discharge: HOME | End: 2022-04-29
Payer: COMMERCIAL

## 2022-04-29 VITALS
BODY MASS INDEX: 20.08 KG/M2 | HEART RATE: 68 BPM | TEMPERATURE: 98.2 F | SYSTOLIC BLOOD PRESSURE: 141 MMHG | OXYGEN SATURATION: 100 % | DIASTOLIC BLOOD PRESSURE: 85 MMHG | WEIGHT: 117 LBS | RESPIRATION RATE: 18 BRPM

## 2022-04-29 DIAGNOSIS — D50.0 IRON DEFICIENCY ANEMIA DUE TO CHRONIC BLOOD LOSS: ICD-10-CM

## 2022-04-29 DIAGNOSIS — C80.1 CARCINOMA OF UNKNOWN PRIMARY (HCC): Primary | ICD-10-CM

## 2022-04-29 LAB — 25(OH)D3 SERPL-MCNC: 71.5 NG/ML (ref 30–100)

## 2022-04-29 PROCEDURE — 96411 CHEMO IV PUSH ADDL DRUG: CPT

## 2022-04-29 PROCEDURE — 82306 VITAMIN D 25 HYDROXY: CPT

## 2022-04-29 PROCEDURE — 96375 TX/PRO/DX INJ NEW DRUG ADDON: CPT

## 2022-04-29 PROCEDURE — 96368 THER/DIAG CONCURRENT INF: CPT

## 2022-04-29 PROCEDURE — 96413 CHEMO IV INFUSION 1 HR: CPT

## 2022-04-29 PROCEDURE — G0498 CHEMO EXTEND IV INFUS W/PUMP: HCPCS

## 2022-04-29 PROCEDURE — 96417 CHEMO IV INFUS EACH ADDL SEQ: CPT

## 2022-04-29 PROCEDURE — 74011000258 HC RX REV CODE- 258: Performed by: NURSE PRACTITIONER

## 2022-04-29 PROCEDURE — 96415 CHEMO IV INFUSION ADDL HR: CPT

## 2022-04-29 PROCEDURE — 74011250636 HC RX REV CODE- 250/636: Performed by: NURSE PRACTITIONER

## 2022-04-29 RX ORDER — SODIUM CHLORIDE 9 MG/ML
25 INJECTION, SOLUTION INTRAVENOUS CONTINUOUS
Status: ACTIVE | OUTPATIENT
Start: 2022-04-29 | End: 2022-04-29

## 2022-04-29 RX ORDER — SODIUM CHLORIDE 0.9 % (FLUSH) 0.9 %
10 SYRINGE (ML) INJECTION AS NEEDED
Status: ACTIVE | OUTPATIENT
Start: 2022-04-29 | End: 2022-04-29

## 2022-04-29 RX ORDER — SODIUM CHLORIDE 0.9 % (FLUSH) 0.9 %
10 SYRINGE (ML) INJECTION AS NEEDED
Status: CANCELLED | OUTPATIENT
Start: 2022-05-01

## 2022-04-29 RX ORDER — ALBUTEROL SULFATE 0.83 MG/ML
2.5 SOLUTION RESPIRATORY (INHALATION) AS NEEDED
Status: CANCELLED
Start: 2022-04-29

## 2022-04-29 RX ORDER — HEPARIN 100 UNIT/ML
300-500 SYRINGE INTRAVENOUS AS NEEDED
Status: CANCELLED
Start: 2022-05-01

## 2022-04-29 RX ORDER — ONDANSETRON 2 MG/ML
8 INJECTION INTRAMUSCULAR; INTRAVENOUS ONCE
Status: COMPLETED | OUTPATIENT
Start: 2022-04-29 | End: 2022-04-29

## 2022-04-29 RX ORDER — ONDANSETRON 2 MG/ML
8 INJECTION INTRAMUSCULAR; INTRAVENOUS AS NEEDED
Status: CANCELLED | OUTPATIENT
Start: 2022-04-29

## 2022-04-29 RX ORDER — HYDROCORTISONE SODIUM SUCCINATE 100 MG/2ML
100 INJECTION, POWDER, FOR SOLUTION INTRAMUSCULAR; INTRAVENOUS AS NEEDED
Status: CANCELLED | OUTPATIENT
Start: 2022-04-29

## 2022-04-29 RX ORDER — DIPHENHYDRAMINE HYDROCHLORIDE 50 MG/ML
50 INJECTION, SOLUTION INTRAMUSCULAR; INTRAVENOUS AS NEEDED
Status: CANCELLED
Start: 2022-04-29

## 2022-04-29 RX ORDER — FLUOROURACIL 50 MG/ML
400 INJECTION, SOLUTION INTRAVENOUS ONCE
Status: COMPLETED | OUTPATIENT
Start: 2022-04-29 | End: 2022-04-29

## 2022-04-29 RX ORDER — ACETAMINOPHEN 325 MG/1
650 TABLET ORAL AS NEEDED
Status: CANCELLED
Start: 2022-04-29

## 2022-04-29 RX ORDER — DEXTROSE MONOHYDRATE 50 MG/ML
25 INJECTION, SOLUTION INTRAVENOUS CONTINUOUS
Status: ACTIVE | OUTPATIENT
Start: 2022-04-29 | End: 2022-04-29

## 2022-04-29 RX ORDER — SODIUM CHLORIDE 9 MG/ML
10 INJECTION INTRAMUSCULAR; INTRAVENOUS; SUBCUTANEOUS AS NEEDED
Status: CANCELLED | OUTPATIENT
Start: 2022-05-01

## 2022-04-29 RX ORDER — SODIUM CHLORIDE 9 MG/ML
10 INJECTION INTRAMUSCULAR; INTRAVENOUS; SUBCUTANEOUS AS NEEDED
Status: CANCELLED | OUTPATIENT
Start: 2022-04-29

## 2022-04-29 RX ORDER — HEPARIN 100 UNIT/ML
300-500 SYRINGE INTRAVENOUS AS NEEDED
Status: CANCELLED
Start: 2022-04-29

## 2022-04-29 RX ORDER — EPINEPHRINE 1 MG/ML
0.3 INJECTION, SOLUTION, CONCENTRATE INTRAVENOUS AS NEEDED
Status: CANCELLED | OUTPATIENT
Start: 2022-04-29

## 2022-04-29 RX ORDER — DIPHENHYDRAMINE HYDROCHLORIDE 50 MG/ML
25 INJECTION, SOLUTION INTRAMUSCULAR; INTRAVENOUS AS NEEDED
Status: CANCELLED
Start: 2022-04-29

## 2022-04-29 RX ADMIN — DEXTROSE MONOHYDRATE 25 ML/HR: 5 INJECTION, SOLUTION INTRAVENOUS at 11:14

## 2022-04-29 RX ADMIN — DEXAMETHASONE SODIUM PHOSPHATE 12 MG: 4 INJECTION, SOLUTION INTRAMUSCULAR; INTRAVENOUS at 11:19

## 2022-04-29 RX ADMIN — SODIUM CHLORIDE 280 MG: 9 INJECTION, SOLUTION INTRAVENOUS at 10:33

## 2022-04-29 RX ADMIN — OXALIPLATIN 135 MG: 5 INJECTION, SOLUTION INTRAVENOUS at 11:45

## 2022-04-29 RX ADMIN — FLUOROURACIL 3816 MG: 50 INJECTION, SOLUTION INTRAVENOUS at 13:58

## 2022-04-29 RX ADMIN — ONDANSETRON 8 MG: 2 INJECTION INTRAMUSCULAR; INTRAVENOUS at 11:16

## 2022-04-29 RX ADMIN — LEUCOVORIN CALCIUM 636 MG: 500 INJECTION, POWDER, LYOPHILIZED, FOR SOLUTION INTRAMUSCULAR; INTRAVENOUS at 11:45

## 2022-04-29 RX ADMIN — FLUOROURACIL 636 MG: 50 INJECTION, SOLUTION INTRAVENOUS at 13:51

## 2022-04-29 RX ADMIN — SODIUM CHLORIDE 25 ML/HR: 900 INJECTION, SOLUTION INTRAVENOUS at 09:46

## 2022-04-29 NOTE — PROGRESS NOTES
Arrived to the [facility]. Chemo completed. Patient tolerated well. Continuous chemo pump connected and instructions reviewed. Any issues or concerns during appointment: None. Patient aware of next infusion appointment on 5/1 (date) at 1400 (time). Patient aware of next lab and [facility] office visit on 5/12 (date) at 200 (time). Patient instructed to call provider with temperature of 100.4 or greater or nausea/vomiting/ diarrhea or pain not controlled by medications  Discharged ambulatory in stable condition.

## 2022-05-01 ENCOUNTER — HOSPITAL ENCOUNTER (OUTPATIENT)
Dept: INFUSION THERAPY | Age: 47
Discharge: HOME | End: 2022-05-01
Payer: COMMERCIAL

## 2022-05-01 VITALS
SYSTOLIC BLOOD PRESSURE: 143 MMHG | HEART RATE: 74 BPM | TEMPERATURE: 98.1 F | OXYGEN SATURATION: 98 % | DIASTOLIC BLOOD PRESSURE: 84 MMHG | RESPIRATION RATE: 18 BRPM

## 2022-05-01 DIAGNOSIS — C80.1 CARCINOMA OF UNKNOWN PRIMARY (HCC): Primary | ICD-10-CM

## 2022-05-01 DIAGNOSIS — D50.0 IRON DEFICIENCY ANEMIA DUE TO CHRONIC BLOOD LOSS: ICD-10-CM

## 2022-05-01 PROCEDURE — 96523 IRRIG DRUG DELIVERY DEVICE: CPT

## 2022-05-01 PROCEDURE — 74011000250 HC RX REV CODE- 250: Performed by: NURSE PRACTITIONER

## 2022-05-01 RX ORDER — SODIUM CHLORIDE 0.9 % (FLUSH) 0.9 %
10 SYRINGE (ML) INJECTION AS NEEDED
Status: DISCONTINUED | OUTPATIENT
Start: 2022-05-01 | End: 2022-05-02 | Stop reason: HOSPADM

## 2022-05-01 RX ADMIN — SODIUM CHLORIDE, PRESERVATIVE FREE 10 ML: 5 INJECTION INTRAVENOUS at 12:00

## 2022-05-01 NOTE — PROGRESS NOTES
Arrived to the [facility]. Adrucil pump completed. Provided education on oral hydration    Patient instructed to report any side affects to ordering provider. Patient tolerated without problems. Any issues or concerns during appointment: no.  Patient aware of next infusion appointment on 5/13/22 (date) at 0900 (time). Discharged ambulatory with family.

## 2022-05-12 ENCOUNTER — HOSPITAL ENCOUNTER (OUTPATIENT)
Dept: LAB | Age: 47
Discharge: HOME | End: 2022-05-12
Payer: COMMERCIAL

## 2022-05-12 DIAGNOSIS — C80.1 CARCINOMA OF UNKNOWN PRIMARY (HCC): ICD-10-CM

## 2022-05-12 PROBLEM — C78.6 METASTASIS TO PERITONEUM OF UNKNOWN PRIMARY (HCC): Status: ACTIVE | Noted: 2022-05-12

## 2022-05-12 LAB
ALBUMIN SERPL-MCNC: 3.7 G/DL (ref 3.5–5)
ALBUMIN/GLOB SERPL: 1.1 {RATIO} (ref 1.2–3.5)
ALP SERPL-CCNC: 77 U/L (ref 50–136)
ALT SERPL-CCNC: 34 U/L (ref 12–65)
ANION GAP SERPL CALC-SCNC: 4 MMOL/L (ref 7–16)
AST SERPL-CCNC: 24 U/L (ref 15–37)
BASOPHILS # BLD: 0 K/UL (ref 0–0.2)
BASOPHILS NFR BLD: 0 % (ref 0–2)
BILIRUB SERPL-MCNC: 0.3 MG/DL (ref 0.2–1.1)
BUN SERPL-MCNC: 14 MG/DL (ref 6–23)
CALCIUM SERPL-MCNC: 8.8 MG/DL (ref 8.3–10.4)
CHLORIDE SERPL-SCNC: 103 MMOL/L (ref 98–107)
CO2 SERPL-SCNC: 30 MMOL/L (ref 21–32)
CREAT SERPL-MCNC: 0.7 MG/DL (ref 0.6–1)
DIFFERENTIAL METHOD BLD: ABNORMAL
EOSINOPHIL # BLD: 0.2 K/UL (ref 0–0.8)
EOSINOPHIL NFR BLD: 3 % (ref 0.5–7.8)
ERYTHROCYTE [DISTWIDTH] IN BLOOD BY AUTOMATED COUNT: 16.4 % (ref 11.9–14.6)
GLOBULIN SER CALC-MCNC: 3.5 G/DL (ref 2.3–3.5)
GLUCOSE SERPL-MCNC: 102 MG/DL (ref 65–100)
HCT VFR BLD AUTO: 36.7 % (ref 35.8–46.3)
HGB BLD-MCNC: 11.5 G/DL (ref 11.7–15.4)
IMM GRANULOCYTES # BLD AUTO: 0 K/UL (ref 0–0.5)
IMM GRANULOCYTES NFR BLD AUTO: 0 % (ref 0–5)
LYMPHOCYTES # BLD: 1.7 K/UL (ref 0.5–4.6)
LYMPHOCYTES NFR BLD: 24 % (ref 13–44)
MAGNESIUM SERPL-MCNC: 2 MG/DL (ref 1.8–2.4)
MCH RBC QN AUTO: 29.1 PG (ref 26.1–32.9)
MCHC RBC AUTO-ENTMCNC: 31.3 G/DL (ref 31.4–35)
MCV RBC AUTO: 92.9 FL (ref 79.6–97.8)
MONOCYTES # BLD: 1 K/UL (ref 0.1–1.3)
MONOCYTES NFR BLD: 14 % (ref 4–12)
NEUTS SEG # BLD: 4.2 K/UL (ref 1.7–8.2)
NEUTS SEG NFR BLD: 59 % (ref 43–78)
NRBC # BLD: 0 K/UL (ref 0–0.2)
PLATELET # BLD AUTO: 126 K/UL (ref 150–450)
PMV BLD AUTO: 10.4 FL (ref 9.4–12.3)
POTASSIUM SERPL-SCNC: 3.8 MMOL/L (ref 3.5–5.1)
PROT SERPL-MCNC: 7.2 G/DL (ref 6.3–8.2)
PROT UR-MCNC: 77 MG/DL
RBC # BLD AUTO: 3.95 M/UL (ref 4.05–5.2)
SODIUM SERPL-SCNC: 137 MMOL/L (ref 136–145)
WBC # BLD AUTO: 7 K/UL (ref 4.3–11.1)

## 2022-05-12 PROCEDURE — 36415 COLL VENOUS BLD VENIPUNCTURE: CPT

## 2022-05-12 PROCEDURE — 80053 COMPREHEN METABOLIC PANEL: CPT

## 2022-05-12 PROCEDURE — 85025 COMPLETE CBC W/AUTO DIFF WBC: CPT

## 2022-05-12 PROCEDURE — 84156 ASSAY OF PROTEIN URINE: CPT

## 2022-05-12 PROCEDURE — 83735 ASSAY OF MAGNESIUM: CPT

## 2022-05-12 NOTE — ADDENDUM NOTE
Encounter addended by: FREDDY Hill [facility] on: 5/12/2022 3:51 PM   Actions taken: Jackie created or edited

## 2022-05-13 ENCOUNTER — PATIENT OUTREACH (OUTPATIENT)
Dept: CASE MANAGEMENT | Age: 47
End: 2022-05-13

## 2022-05-13 ENCOUNTER — HOSPITAL ENCOUNTER (OUTPATIENT)
Dept: INFUSION THERAPY | Age: 47
Discharge: HOME | End: 2022-05-13
Payer: COMMERCIAL

## 2022-05-13 VITALS
TEMPERATURE: 97.8 F | HEART RATE: 73 BPM | DIASTOLIC BLOOD PRESSURE: 70 MMHG | OXYGEN SATURATION: 100 % | BODY MASS INDEX: 20.12 KG/M2 | WEIGHT: 117.2 LBS | SYSTOLIC BLOOD PRESSURE: 146 MMHG | RESPIRATION RATE: 18 BRPM

## 2022-05-13 DIAGNOSIS — C80.1 METASTASIS TO PERITONEUM OF UNKNOWN PRIMARY (HCC): Primary | ICD-10-CM

## 2022-05-13 DIAGNOSIS — C78.6 METASTASIS TO PERITONEUM OF UNKNOWN PRIMARY (HCC): Primary | ICD-10-CM

## 2022-05-13 DIAGNOSIS — C80.1 CARCINOMA OF UNKNOWN PRIMARY (HCC): Primary | ICD-10-CM

## 2022-05-13 PROCEDURE — 96417 CHEMO IV INFUS EACH ADDL SEQ: CPT

## 2022-05-13 PROCEDURE — 96375 TX/PRO/DX INJ NEW DRUG ADDON: CPT

## 2022-05-13 PROCEDURE — 96413 CHEMO IV INFUSION 1 HR: CPT

## 2022-05-13 PROCEDURE — 74011000250 HC RX REV CODE- 250: Performed by: INTERNAL MEDICINE

## 2022-05-13 PROCEDURE — 74011250636 HC RX REV CODE- 250/636: Performed by: INTERNAL MEDICINE

## 2022-05-13 PROCEDURE — 96415 CHEMO IV INFUSION ADDL HR: CPT

## 2022-05-13 PROCEDURE — G0498 CHEMO EXTEND IV INFUS W/PUMP: HCPCS

## 2022-05-13 PROCEDURE — 74011000258 HC RX REV CODE- 258: Performed by: INTERNAL MEDICINE

## 2022-05-13 PROCEDURE — 96368 THER/DIAG CONCURRENT INF: CPT

## 2022-05-13 PROCEDURE — 96411 CHEMO IV PUSH ADDL DRUG: CPT

## 2022-05-13 RX ORDER — FLUOROURACIL 50 MG/ML
400 INJECTION, SOLUTION INTRAVENOUS ONCE
Status: COMPLETED | OUTPATIENT
Start: 2022-05-13 | End: 2022-05-13

## 2022-05-13 RX ORDER — SODIUM CHLORIDE 0.9 % (FLUSH) 0.9 %
10 SYRINGE (ML) INJECTION AS NEEDED
Status: ACTIVE | OUTPATIENT
Start: 2022-05-13 | End: 2022-05-13

## 2022-05-13 RX ORDER — DEXTROSE MONOHYDRATE 50 MG/ML
25 INJECTION, SOLUTION INTRAVENOUS CONTINUOUS
Status: ACTIVE | OUTPATIENT
Start: 2022-05-13 | End: 2022-05-13

## 2022-05-13 RX ORDER — SODIUM CHLORIDE 9 MG/ML
25 INJECTION, SOLUTION INTRAVENOUS CONTINUOUS
Status: ACTIVE | OUTPATIENT
Start: 2022-05-13 | End: 2022-05-13

## 2022-05-13 RX ORDER — ONDANSETRON 2 MG/ML
8 INJECTION INTRAMUSCULAR; INTRAVENOUS ONCE
Status: COMPLETED | OUTPATIENT
Start: 2022-05-13 | End: 2022-05-13

## 2022-05-13 RX ADMIN — LEUCOVORIN CALCIUM 636 MG: 500 INJECTION, POWDER, LYOPHILIZED, FOR SOLUTION INTRAMUSCULAR; INTRAVENOUS at 11:18

## 2022-05-13 RX ADMIN — FLUOROURACIL 636 MG: 50 INJECTION, SOLUTION INTRAVENOUS at 13:32

## 2022-05-13 RX ADMIN — OXALIPLATIN 135 MG: 5 INJECTION, SOLUTION INTRAVENOUS at 11:18

## 2022-05-13 RX ADMIN — FLUOROURACIL 3816 MG: 50 INJECTION, SOLUTION INTRAVENOUS at 13:37

## 2022-05-13 RX ADMIN — DEXAMETHASONE SODIUM PHOSPHATE 12 MG: 4 INJECTION, SOLUTION INTRAMUSCULAR; INTRAVENOUS at 10:30

## 2022-05-13 RX ADMIN — DEXTROSE MONOHYDRATE 25 ML/HR: 5 INJECTION, SOLUTION INTRAVENOUS at 10:27

## 2022-05-13 RX ADMIN — SODIUM CHLORIDE 280 MG: 9 INJECTION, SOLUTION INTRAVENOUS at 09:48

## 2022-05-13 RX ADMIN — SODIUM CHLORIDE, PRESERVATIVE FREE 10 ML: 5 INJECTION INTRAVENOUS at 09:05

## 2022-05-13 RX ADMIN — SODIUM CHLORIDE, PRESERVATIVE FREE 10 ML: 5 INJECTION INTRAVENOUS at 13:30

## 2022-05-13 RX ADMIN — ONDANSETRON 8 MG: 2 INJECTION INTRAMUSCULAR; INTRAVENOUS at 10:27

## 2022-05-13 RX ADMIN — SODIUM CHLORIDE 25 ML/HR: 900 INJECTION, SOLUTION INTRAVENOUS at 09:05

## 2022-05-13 NOTE — PROGRESS NOTES
Saw patient on 5-12-22 with Dr Gael Pendleton prior to C4 Avastin/Folfox. Pt had many questions concerning her cancer and its pathology and treatment. Pt stated all of her questions were answered to her satisfaction. Pt labs are satisfactory. Khalida Dickinson is here as pt remains concerned about her weight.   Nurse navigation is following

## 2022-05-13 NOTE — PROGRESS NOTES
Arrived to the [facility]. Zirabev/FOLFOX completed. Elastomeric pump infusing. Patient tolerated well. Any issues or concerns during appointment: no.  Patient aware of next infusion appointment on 5/15/2022 (date) at 11:30 am (time). Patient aware of next lab and [facility] office visit on 5/26/2022 (date) at 1 pm (time). Patient instructed to call provider with temperature of 100.4 or greater or nausea/vomiting/ diarrhea or pain not controlled by medications  Discharged ambulatory with family member. Transposition Flap Text: The defect edges were debeveled with a #15 scalpel blade.  Given the location of the defect and the proximity to free margins a transposition flap was deemed most appropriate.  Using a sterile surgical marker, an appropriate transposition flap was drawn incorporating the defect.    The area thus outlined was incised deep to adipose tissue with a #15 scalpel blade.  The skin margins were undermined to an appropriate distance in all directions utilizing iris scissors.

## 2022-05-15 ENCOUNTER — HOSPITAL ENCOUNTER (OUTPATIENT)
Dept: INFUSION THERAPY | Age: 47
Discharge: HOME | End: 2022-05-15
Payer: COMMERCIAL

## 2022-05-15 VITALS
HEART RATE: 58 BPM | OXYGEN SATURATION: 100 % | SYSTOLIC BLOOD PRESSURE: 139 MMHG | RESPIRATION RATE: 18 BRPM | TEMPERATURE: 98.1 F | DIASTOLIC BLOOD PRESSURE: 90 MMHG

## 2022-05-15 DIAGNOSIS — D50.0 IRON DEFICIENCY ANEMIA DUE TO CHRONIC BLOOD LOSS: ICD-10-CM

## 2022-05-15 DIAGNOSIS — C80.1 CARCINOMA OF UNKNOWN PRIMARY (HCC): Primary | ICD-10-CM

## 2022-05-15 PROCEDURE — 96523 IRRIG DRUG DELIVERY DEVICE: CPT

## 2022-05-15 PROCEDURE — 74011000250 HC RX REV CODE- 250: Performed by: INTERNAL MEDICINE

## 2022-05-15 RX ORDER — SODIUM CHLORIDE 0.9 % (FLUSH) 0.9 %
10 SYRINGE (ML) INJECTION AS NEEDED
Status: ACTIVE | OUTPATIENT
Start: 2022-05-15 | End: 2022-05-15

## 2022-05-15 RX ADMIN — SODIUM CHLORIDE, PRESERVATIVE FREE 10 ML: 5 INJECTION INTRAVENOUS at 11:57

## 2022-05-15 NOTE — PROGRESS NOTES
Arrived to the [facility]. Patient arrived with Adrucil chemo pump from home. Pump disconnected using chemo precautions and cassette discarded into chemogator. Provided education on chemo side effects and increasing PO fluid intake. Patient instructed to report any side affects to ordering provider. Any issues or concerns during appointment: none. Denied need for IVF. Patient aware of next infusion appointment on 5/27/22 at 1100. Discharged amb.

## 2022-05-26 ENCOUNTER — OFFICE VISIT (OUTPATIENT)
Dept: ONCOLOGY | Age: 47
End: 2022-05-26
Payer: COMMERCIAL

## 2022-05-26 ENCOUNTER — HOSPITAL ENCOUNTER (OUTPATIENT)
Dept: LAB | Age: 47
Discharge: HOME | End: 2022-05-29
Payer: COMMERCIAL

## 2022-05-26 ENCOUNTER — OFFICE VISIT (OUTPATIENT)
Dept: ONCOLOGY | Age: 47
End: 2022-05-26

## 2022-05-26 VITALS
OXYGEN SATURATION: 99 % | SYSTOLIC BLOOD PRESSURE: 138 MMHG | HEIGHT: 64 IN | RESPIRATION RATE: 14 BRPM | HEART RATE: 76 BPM | BODY MASS INDEX: 19.99 KG/M2 | WEIGHT: 117.1 LBS | TEMPERATURE: 98.9 F | DIASTOLIC BLOOD PRESSURE: 93 MMHG

## 2022-05-26 DIAGNOSIS — R53.83 FATIGUE DUE TO TREATMENT: ICD-10-CM

## 2022-05-26 DIAGNOSIS — C80.1 CARCINOMA OF UNKNOWN PRIMARY (HCC): Primary | ICD-10-CM

## 2022-05-26 DIAGNOSIS — C80.1 METASTASIS TO PERITONEUM OF UNKNOWN PRIMARY (HCC): ICD-10-CM

## 2022-05-26 DIAGNOSIS — Z00.8 NUTRITIONAL ASSESSMENT: Primary | ICD-10-CM

## 2022-05-26 DIAGNOSIS — Z79.899 HIGH RISK MEDICATION USE: ICD-10-CM

## 2022-05-26 DIAGNOSIS — C78.6 METASTASIS TO PERITONEUM OF UNKNOWN PRIMARY (HCC): ICD-10-CM

## 2022-05-26 DIAGNOSIS — C80.1 CARCINOMA OF UNKNOWN PRIMARY (HCC): ICD-10-CM

## 2022-05-26 LAB
ALBUMIN SERPL-MCNC: 3.7 G/DL (ref 3.5–5)
ALBUMIN/GLOB SERPL: 1 {RATIO} (ref 1.2–3.5)
ALP SERPL-CCNC: 77 U/L (ref 50–136)
ALT SERPL-CCNC: 26 U/L (ref 12–65)
AMORPH CRY URNS QL MICRO: ABNORMAL
ANION GAP SERPL CALC-SCNC: 4 MMOL/L (ref 7–16)
APPEARANCE UR: ABNORMAL
AST SERPL-CCNC: 23 U/L (ref 15–37)
BACTERIA URNS QL MICRO: ABNORMAL /HPF
BASOPHILS # BLD: 0 K/UL (ref 0–0.2)
BASOPHILS NFR BLD: 0 % (ref 0–2)
BILIRUB SERPL-MCNC: 0.3 MG/DL (ref 0.2–1.1)
BILIRUB UR QL: NEGATIVE
BUN SERPL-MCNC: 13 MG/DL (ref 6–23)
CALCIUM SERPL-MCNC: 8.7 MG/DL (ref 8.3–10.4)
CASTS URNS QL MICRO: 0 /LPF
CEA SERPL-MCNC: 2.6 NG/ML (ref 0–3)
CHLORIDE SERPL-SCNC: 103 MMOL/L (ref 98–107)
CO2 SERPL-SCNC: 31 MMOL/L (ref 21–32)
COLOR UR: YELLOW
CREAT SERPL-MCNC: 0.8 MG/DL (ref 0.6–1)
CREAT UR-MCNC: 73 MG/DL
CRYSTALS URNS QL MICRO: 0 /LPF
DIFFERENTIAL METHOD BLD: ABNORMAL
EOSINOPHIL # BLD: 0.1 K/UL (ref 0–0.8)
EOSINOPHIL NFR BLD: 2 % (ref 0.5–7.8)
EPI CELLS #/AREA URNS HPF: ABNORMAL /HPF
ERYTHROCYTE [DISTWIDTH] IN BLOOD BY AUTOMATED COUNT: 16.7 % (ref 11.9–14.6)
GLOBULIN SER CALC-MCNC: 3.8 G/DL (ref 2.3–3.5)
GLUCOSE SERPL-MCNC: 106 MG/DL (ref 65–100)
GLUCOSE UR STRIP.AUTO-MCNC: NEGATIVE MG/DL
HCT VFR BLD AUTO: 38.2 % (ref 35.8–46.3)
HGB BLD-MCNC: 12.1 G/DL (ref 11.7–15.4)
HGB UR QL STRIP: ABNORMAL
IMM GRANULOCYTES # BLD AUTO: 0 K/UL (ref 0–0.5)
IMM GRANULOCYTES NFR BLD AUTO: 0 % (ref 0–5)
KETONES UR QL STRIP.AUTO: NEGATIVE MG/DL
LEUKOCYTE ESTERASE UR QL STRIP.AUTO: ABNORMAL
LYMPHOCYTES # BLD: 1.8 K/UL (ref 0.5–4.6)
LYMPHOCYTES NFR BLD: 30 % (ref 13–44)
MAGNESIUM SERPL-MCNC: 2 MG/DL (ref 1.8–2.4)
MCH RBC QN AUTO: 29.4 PG (ref 26.1–32.9)
MCHC RBC AUTO-ENTMCNC: 31.7 G/DL (ref 31.4–35)
MCV RBC AUTO: 92.7 FL (ref 79.6–97.8)
MONOCYTES # BLD: 0.9 K/UL (ref 0.1–1.3)
MONOCYTES NFR BLD: 14 % (ref 4–12)
MUCOUS THREADS URNS QL MICRO: ABNORMAL /LPF
NEUTS SEG # BLD: 3.3 K/UL (ref 1.7–8.2)
NEUTS SEG NFR BLD: 54 % (ref 43–78)
NITRITE UR QL STRIP.AUTO: NEGATIVE
NRBC # BLD: 0 K/UL (ref 0–0.2)
PH UR STRIP: 5 [PH] (ref 5–9)
PLATELET # BLD AUTO: 169 K/UL (ref 150–450)
PMV BLD AUTO: 10.2 FL (ref 9.4–12.3)
POTASSIUM SERPL-SCNC: 4 MMOL/L (ref 3.5–5.1)
PROT SERPL-MCNC: 7.5 G/DL (ref 6.3–8.2)
PROT UR STRIP-MCNC: 100 MG/DL
PROT UR-MCNC: 133 MG/DL
PROT UR-MCNC: 135 MG/DL
PROT/CREAT UR-RTO: 1.8
RBC # BLD AUTO: 4.12 M/UL (ref 4.05–5.2)
RBC #/AREA URNS HPF: ABNORMAL /HPF
SODIUM SERPL-SCNC: 138 MMOL/L (ref 136–145)
SP GR UR REFRACTOMETRY: 1.02 (ref 1–1.02)
UROBILINOGEN UR QL STRIP.AUTO: 0.2 EU/DL (ref 0.2–1)
WBC # BLD AUTO: 6.2 K/UL (ref 4.3–11.1)
WBC URNS QL MICRO: ABNORMAL /HPF

## 2022-05-26 PROCEDURE — 85025 COMPLETE CBC W/AUTO DIFF WBC: CPT

## 2022-05-26 PROCEDURE — 84156 ASSAY OF PROTEIN URINE: CPT

## 2022-05-26 PROCEDURE — 82378 CARCINOEMBRYONIC ANTIGEN: CPT

## 2022-05-26 PROCEDURE — 36415 COLL VENOUS BLD VENIPUNCTURE: CPT

## 2022-05-26 PROCEDURE — 81015 MICROSCOPIC EXAM OF URINE: CPT

## 2022-05-26 PROCEDURE — 99214 OFFICE O/P EST MOD 30 MIN: CPT | Performed by: NURSE PRACTITIONER

## 2022-05-26 PROCEDURE — 81001 URINALYSIS AUTO W/SCOPE: CPT

## 2022-05-26 PROCEDURE — 80053 COMPREHEN METABOLIC PANEL: CPT

## 2022-05-26 PROCEDURE — 83735 ASSAY OF MAGNESIUM: CPT

## 2022-05-26 RX ORDER — EPINEPHRINE 1 MG/ML
0.3 INJECTION, SOLUTION, CONCENTRATE INTRAVENOUS PRN
Status: CANCELLED | OUTPATIENT
Start: 2022-05-27

## 2022-05-26 RX ORDER — SODIUM CHLORIDE 9 MG/ML
5-250 INJECTION, SOLUTION INTRAVENOUS PRN
Status: CANCELLED | OUTPATIENT
Start: 2022-05-27

## 2022-05-26 RX ORDER — POTASSIUM CHLORIDE 20 MEQ/1
20 TABLET, EXTENDED RELEASE ORAL 2 TIMES DAILY
COMMUNITY
Start: 2022-04-15 | End: 2022-06-09 | Stop reason: SDUPTHER

## 2022-05-26 RX ORDER — SODIUM CHLORIDE 9 MG/ML
INJECTION, SOLUTION INTRAVENOUS CONTINUOUS
Status: CANCELLED | OUTPATIENT
Start: 2022-05-27

## 2022-05-26 RX ORDER — SODIUM CHLORIDE 9 MG/ML
5-40 INJECTION INTRAVENOUS PRN
Status: CANCELLED | OUTPATIENT
Start: 2022-05-29

## 2022-05-26 RX ORDER — SODIUM CHLORIDE 9 MG/ML
5-40 INJECTION INTRAVENOUS PRN
Status: CANCELLED | OUTPATIENT
Start: 2022-05-27

## 2022-05-26 RX ORDER — FLUOROURACIL 50 MG/ML
400 INJECTION, SOLUTION INTRAVENOUS ONCE
Status: CANCELLED | OUTPATIENT
Start: 2022-05-27 | End: 2022-05-27

## 2022-05-26 RX ORDER — ONDANSETRON 2 MG/ML
8 INJECTION INTRAMUSCULAR; INTRAVENOUS ONCE
Status: CANCELLED | OUTPATIENT
Start: 2022-05-27 | End: 2022-05-27

## 2022-05-26 RX ORDER — DIPHENHYDRAMINE HYDROCHLORIDE 50 MG/ML
50 INJECTION INTRAMUSCULAR; INTRAVENOUS
Status: CANCELLED | OUTPATIENT
Start: 2022-05-27

## 2022-05-26 RX ORDER — SODIUM CHLORIDE 9 MG/ML
5-250 INJECTION, SOLUTION INTRAVENOUS PRN
Status: CANCELLED | OUTPATIENT
Start: 2022-05-29

## 2022-05-26 RX ORDER — SODIUM CHLORIDE 0.9 % (FLUSH) 0.9 %
5-40 SYRINGE (ML) INJECTION PRN
Status: CANCELLED | OUTPATIENT
Start: 2022-05-29

## 2022-05-26 RX ORDER — HEPARIN SODIUM (PORCINE) LOCK FLUSH IV SOLN 100 UNIT/ML 100 UNIT/ML
500 SOLUTION INTRAVENOUS PRN
Status: CANCELLED | OUTPATIENT
Start: 2022-05-29

## 2022-05-26 RX ORDER — MEPERIDINE HYDROCHLORIDE 50 MG/ML
12.5 INJECTION INTRAMUSCULAR; INTRAVENOUS; SUBCUTANEOUS PRN
Status: CANCELLED | OUTPATIENT
Start: 2022-05-27

## 2022-05-26 RX ORDER — FAMOTIDINE 10 MG/ML
20 INJECTION, SOLUTION INTRAVENOUS
Status: CANCELLED | OUTPATIENT
Start: 2022-05-27

## 2022-05-26 RX ORDER — HEPARIN SODIUM (PORCINE) LOCK FLUSH IV SOLN 100 UNIT/ML 100 UNIT/ML
500 SOLUTION INTRAVENOUS PRN
Status: CANCELLED | OUTPATIENT
Start: 2022-05-27

## 2022-05-26 RX ORDER — SODIUM CHLORIDE 0.9 % (FLUSH) 0.9 %
5-40 SYRINGE (ML) INJECTION PRN
Status: CANCELLED | OUTPATIENT
Start: 2022-05-27

## 2022-05-26 RX ORDER — ONDANSETRON 2 MG/ML
8 INJECTION INTRAMUSCULAR; INTRAVENOUS
Status: CANCELLED | OUTPATIENT
Start: 2022-05-27

## 2022-05-26 RX ORDER — DEXTROSE MONOHYDRATE 50 MG/ML
5-250 INJECTION, SOLUTION INTRAVENOUS PRN
Status: CANCELLED | OUTPATIENT
Start: 2022-05-27

## 2022-05-26 RX ORDER — ALBUTEROL SULFATE 90 UG/1
4 AEROSOL, METERED RESPIRATORY (INHALATION) PRN
Status: CANCELLED | OUTPATIENT
Start: 2022-05-27

## 2022-05-26 RX ORDER — ACETAMINOPHEN 325 MG/1
650 TABLET ORAL
Status: CANCELLED | OUTPATIENT
Start: 2022-05-27

## 2022-05-26 ASSESSMENT — PATIENT HEALTH QUESTIONNAIRE - PHQ9
SUM OF ALL RESPONSES TO PHQ QUESTIONS 1-9: 0
SUM OF ALL RESPONSES TO PHQ QUESTIONS 1-9: 0
1. LITTLE INTEREST OR PLEASURE IN DOING THINGS: 0
SUM OF ALL RESPONSES TO PHQ QUESTIONS 1-9: 0
SUM OF ALL RESPONSES TO PHQ9 QUESTIONS 1 & 2: 0
SUM OF ALL RESPONSES TO PHQ QUESTIONS 1-9: 0
2. FEELING DOWN, DEPRESSED OR HOPELESS: 0

## 2022-05-26 NOTE — PATIENT INSTRUCTIONS
Patient Instructions from Today's Visit    Reason for Visit:  Prechemo visit    Diagnosis Information:  [email or web address]    Plan:  -Cycle 5 Avastin/Folfox today  -CBC ok, CMP not resulted    Follow Up:   In 2 weeks    Recent Lab Results:  Hospital Outpatient Visit on 05/26/2022   Component Date Value Ref Range Status    WBC 05/26/2022 6.2  4.3 - 11.1 K/uL Final    RBC 05/26/2022 4.12  4.05 - 5.2 M/uL Final    Hemoglobin 05/26/2022 12.1  11.7 - 15.4 g/dL Final    Hematocrit 05/26/2022 38.2  35.8 - 46.3 % Final    MCV 05/26/2022 92.7  79.6 - 97.8 FL Final    MCH 05/26/2022 29.4  26.1 - 32.9 PG Final    MCHC 05/26/2022 31.7  31.4 - 35.0 g/dL Final    RDW 05/26/2022 16.7* 11.9 - 14.6 % Final    Platelets 82/11/2663 169  150 - 450 K/uL Final    MPV 05/26/2022 10.2  9.4 - 12.3 FL Final    nRBC 05/26/2022 0.00  0.0 - 0.2 K/uL Final    **Note: Absolute NRBC parameter is now reported with Hemogram**    Seg Neutrophils 05/26/2022 54  43 - 78 % Final    Lymphocytes 05/26/2022 30  13 - 44 % Final    Monocytes 05/26/2022 14* 4.0 - 12.0 % Final    Eosinophils % 05/26/2022 2  0.5 - 7.8 % Final    Basophils 05/26/2022 0  0.0 - 2.0 % Final    Immature Granulocytes 05/26/2022 0  0.0 - 5.0 % Final    Segs Absolute 05/26/2022 3.3  1.7 - 8.2 K/UL Final    Absolute Lymph # 05/26/2022 1.8  0.5 - 4.6 K/UL Final    Absolute Mono # 05/26/2022 0.9  0.1 - 1.3 K/UL Final    Absolute Eos # 05/26/2022 0.1  0.0 - 0.8 K/UL Final    Basophils Absolute 05/26/2022 0.0  0.0 - 0.2 K/UL Final    Absolute Immature Granulocyte 05/26/2022 0.0  0.0 - 0.5 K/UL Final    Differential Type 05/26/2022 AUTOMATED    Final       Treatment Summary has been discussed and given to patient: n/a        -------------------------------------------------------------------------------------------------------------------  Please call our office at [phone] if you have any  of the following symptoms:   · Fever of 100.5 or greater  · Chills  · Shortness of breath  · Swelling or pain in one leg    After office hours an answering service is available and will contact a provider for emergencies or if you are experiencing any of the above symptoms.  Patient does express an interest in My Chart. My Chart log in information explained on the after visit summary printout at the [facility] desk.     TRI Alexander RN, BSN  Nurse Navigator  [phone] cell  [email or web address]

## 2022-05-26 NOTE — PROGRESS NOTES
[address] FOLLOW-UP VISIT         Patient Name: Laurent Roman              Date of Visit: 2022   : 1975   Age:47 y.o. Presenting Complaint:   Laurent Roman  is seen in follow-up for a carcinoma of unknown primary. History of Present Illness:   Ms. Manoj De Los Santos was seen for the first time in our office in 2022. She was a woman of previously good health who began noticing left-sided back discomfort in early 2022. This was associated  ultimately with a sense of difficulty swallowing and ultimately she presented to [facility] for evaluation. Her symptoms were felt to potentially be indicative of a bowel obstruction and she was sent for a CT scan. This study, performed on 2022  was notable for a linear calcific density along the course of the proximal left ureter with associated moderate hydronephrosis potentially indicative of an intraureteral calculus. There was also stranding throughout the retroperitoneal soft tissues anterior  to the left psoas muscle with pelvic ascites. There was also wall thickening involving the descending colon. The question of colitis or serosal implants was raised. The patient had undergone a gastric sleeve placement several years prior. She had  also undergone a negative colonoscopy about 3 years prior to these presentations. There was a history of anemia ultimately resulting in the performance of a hysterectomy approximately 3 years ago for menorrhagia. Because of the CT scan findings, she  was ultimately referred to Dr. Harsh Schwartz for evaluation of a possible pelvic malignancy. On 2022 he performed a laparoscopy and biopsy with evidence of peritoneal carcinomatosis grossly. A \"peritoneal nodule\" and a \"peritoneal implant\"  were both positive for a poorly differentiated metastatic carcinoma potentially consistent with an upper gastrointestinal primary. An omental biopsy showed no evidence of malignancy. Immunohistochemistries were positive for cytokeratin 7 and negative  for cytokeratin 20. The tumor was weakly positive for CDX2. The only other positive study was MOC-31. A PET scan was subsequently performed on  showing elevated FDG uptake in the left pelvis corresponding with a masslike density concerning  for peritoneal carcinomatosis. There was a small volume of free fluid within the peritoneal cavity. There was moderate right hydroureteronephrosis. Despite the pathologic findings, there was no evidence of FDG activity in the upper gastrointestinal  tract. CA-125 was slightly elevated at 44. Given the uncertainty of her diagnosis, the patient went back for additional surgery on February 15 at which time she underwent bilateral ureteral stent placement and bilateral salpingo-oophorectomies. Pathology  from that surgery was notable for poorly differentiated carcinoma with occasional signet ring features. Immunohistochemical stains were most consistent with a tumor of the upper gastrointestinal tract. She is  1 para 1 abortus 0. There is no  family history of cancer. She has had no difficulties with vomiting. She still notes that some food traverses her esophagus slowly. She had undergone a prior dilatation without any evidence of cancer. She subsequently began treatment with FOLFOX ultimately  with the addition of bevacizumab bevacizumab in 2022. She is here today for follow up and C5 FOLFOX. Overall, she is tolerating treatment well. There are no GI or bowel complaints, no mucositis. Appetite is good and weight is stable. Fatigue is present as expected. There is no cough, shortness of breath, or edema. Cold sensitivity is present, no true neuropathy. She denies any pain or bleeding. Denies fevers or infectious symptoms.         Medications:   Current Outpatient Medications   Medication Sig Dispense Refill    potassium chloride (KLOR-CON M) 20 MEQ extended release tablet Take 20 mEq by mouth 2 times daily      acetaminophen (TYLENOL) 500 MG tablet Take by mouth every 6 hours as needed      ergocalciferol (ERGOCALCIFEROL) 1.25 MG (55798 UT) capsule Take 50,000 Units by mouth every 7 days      lidocaine-prilocaine (EMLA) 2.5-2.5 % cream Apply to port about 45 minutes prior to access      metoclopramide (REGLAN) 10 MG tablet Take 10 mg by mouth 4 times daily (before meals and nightly)      pantoprazole (PROTONIX) 40 MG tablet 2 times daily      valACYclovir (VALTREX) 500 MG tablet Take 500 mg by mouth daily      ondansetron (ZOFRAN) 8 MG tablet Take 8 mg by mouth every 8 hours as needed (Patient not taking: Reported on 2022)      oxybutynin (DITROPAN) 5 MG tablet Take 5 mg by mouth 3 times daily as needed (Patient not taking: Reported on 2022)       No current facility-administered medications for this visit. Allergies:  No Known Allergies      Review of Systems: The Review of Systems is documented in full in the internal medical record. All systems are negative other than for those noted above.        Past Medical History:  Past Medical History:   Diagnosis Date    Anemia     -- not a problem since hyst    Colon cancer ([facility] [address] dx 2022     plans for chemo--- followed by dr Chriss Pugh [phone]    no hospitalization    GERD (gastroesophageal reflux disease)     managed with med    History of colonoscopy 2018    Dr. Mirian Johnston, nl (see media note), R     Hypotension     asymptomatic    Obstruction of fallopian tube     per pt has \"1 good tube\"    Weight loss     80lbs weight loss after gastric sleeve         Past Surgical History:  Past Surgical History:   Procedure Laterality Date     SECTION  2009    GASTRIC BYPASS SURGERY  2014    gastric sleeve- Ch[facility]    HYSTERECTOMY      2018    HYSTERECTOMY  2020    [facility] w/ Bilateral salpingectomy and left oophorectomy    IR PORT PLACEMENT EQUAL OR GREATER THAN 5 YEARS  2/28/2022    IR PORT PLACEMENT EQUAL OR GREATER THAN 5 YEARS  2/28/2022    IR PORT PLACEMENT EQUAL OR GREATER THAN 5 YEARS 2/28/2022 [facility] SPECI[facility]   [facility] MYOM[facility]OM[facility]  age Lisa Aguilera 21s\"    also \"unblocked her FT\"    TONSILLECTOMY      UPPER GASTROINTESTINAL ENDOSCOPY      with dilation    UROLOGICAL SURGERY Bilateral 03/15/2022    cysto          Social History:  Social History     Socioeconomic History    Marital status: Single     Spouse name: Not on file    Number of children: Not on file    Years of education: Not on file    Highest education level: Not on file   Occupational History    Not on file   Tobacco Use    Smoking status: Never Smoker    Smokeless tobacco: Never Used   Substance and Sexual Activity    Alcohol use: Not Currently    Drug use: No    Sexual activity: Not on file   Other Topics Concern    Not on file   Social History Narrative    1. Use large speculum. 2.  sister, Rob Regan #[id number] and mom is Renetta White #[id number] (both Kofoed's pts)  3. PCP  Dr. Asim Vargas ([facility] GI Dr. Gee Lal [place]-2018 normal EGD     Social Determinants of Health     Financial Resource Strain:     Difficulty of Paying Living Expenses: Not on file   Food Insecurity:     Worried About Running Out of Food in the Last Year: Not on file    Corbin of Food in the Last Year: Not on file   Transportation Needs:     Lack of Transportation (Medical): Not on file    Lack of Transportation (Non-Medical):  Not on file   Physical Activity:     Days of Exercise per Week: Not on file    Minutes of Exercise per Session: Not on file   Stress:     Feeling of Stress : Not on file   Social Connections:     Frequency of Communication with Friends and Family: Not on file    Frequency of Social Gatherings with Friends and Family: Not on file    Attends Denominational Services: Not on file    Active Member of Clubs or Organizations: Not on file    Attends Club or Organization Meetings: Not on file    Marital Status: Not on file   Intimate Partner Violence:     Fear of Current or Ex-Partner: Not on file    Emotionally Abused: Not on file    Physically Abused: Not on file    Sexually Abused: Not on file   Housing Stability:     Unable to Pay for Housing in the Last Year: Not on file    Number of [place] in the Last Year: Not on file    Unstable Housing in the Last Year: Not on file       Family History:  Family History   Problem Relation Age of Onset    Heart Disease Maternal Grandmother     Hypertension Maternal Grandmother     Heart Disease Maternal Grandfather     Hypertension Maternal Grandfather     Pulmonary Embolism Neg Hx     Colon Cancer Neg Hx     Deep Vein Thrombosis Neg Hx     Ovarian Cancer Neg Hx     Prostate Cancer Neg Hx     Breast Cancer Neg Hx           Physical Examination:   General Appearance: Well appearing appearing patient in no acute distress.     Vital signs:      Vitals:    05/26/22 1318 05/26/22 1325   BP: (!) 145/93 (!) 138/93   Pulse: 67 76   Resp: 14    Temp: 98.9 °F (37.2 °C)    TempSrc: Oral    SpO2: 99%    Weight: 117 lb 1.6 oz (53.1 kg)    Height: 5' 4\" (1.626 m)    Pain Score:   0 - No pain (fatigue-2)       Performance Status: ECOG Level 1   Distress Screening Score:  PHQ-9  5/26/2022   Little interest or pleasure in doing things 0   Little interest or pleasure in doing things -   Feeling down, depressed, or hopeless 0   Trouble falling or staying asleep, or sleeping too much -   Feeling tired or having little energy -   Poor appetite, weight loss, or overeating -   Feeling bad about yourself - or that you are a failure or have let yourself or your family down -   Trouble concentrating on things such as school, work, reading, or watching TV -   Moving or speaking so slowly that other people could have noticed; or the opposite being so fidgety that others notice -   Thoughts of being better off dead, or hurting yourself in some way -   PHQ-2 Score 0   Total Score PHQ 2 -   PHQ-9 Total Score 0   PHQ 9 Score -          HEENT: No oral exam performed. Neck: Supple. There is no thyromegaly. Lymph nodes: Deferred. Breasts: Not examined   Lungs: The lungs are clear to auscultation. There is no chest wall tenderness and no  use of accessory respiratory musculature. Heart: There is no jugular venous distention. The rate is normal and rhythm regular. The S1 and S2 are normal and there are no murmurs or rubs. Abdomen: Soft, slightly tender, bowel sounds present and normal   Skin: No rash, petechiae or ecchymoses. No evidence of malignancy. +hyperpigmentation of palms    Extremities: No cyanosis, clubbing or edema.           Labs/Imaging:  Hospital Outpatient Visit on 05/26/2022   Component Date Value Ref Range Status    Protein, Urine, Random 05/26/2022 135* <11.9 mg/dL Final    WBC 05/26/2022 6.2  4.3 - 11.1 K/uL Final    RBC 05/26/2022 4.12  4.05 - 5.2 M/uL Final    Hemoglobin 05/26/2022 12.1  11.7 - 15.4 g/dL Final    Hematocrit 05/26/2022 38.2  35.8 - 46.3 % Final    MCV 05/26/2022 92.7  79.6 - 97.8 FL Final    MCH 05/26/2022 29.4  26.1 - 32.9 PG Final    MCHC 05/26/2022 31.7  31.4 - 35.0 g/dL Final    RDW 05/26/2022 16.7* 11.9 - 14.6 % Final    Platelets 23/22/9380 169  150 - 450 K/uL Final    MPV 05/26/2022 10.2  9.4 - 12.3 FL Final    nRBC 05/26/2022 0.00  0.0 - 0.2 K/uL Final    **Note: Absolute NRBC parameter is now reported with Hemogram**    Seg Neutrophils 05/26/2022 54  43 - 78 % Final    Lymphocytes 05/26/2022 30  13 - 44 % Final    Monocytes 05/26/2022 14* 4.0 - 12.0 % Final    Eosinophils % 05/26/2022 2  0.5 - 7.8 % Final    Basophils 05/26/2022 0  0.0 - 2.0 % Final    Immature Granulocytes 05/26/2022 0  0.0 - 5.0 % Final    Segs Absolute 05/26/2022 3.3  1.7 - 8.2 K/UL Final    Absolute Lymph # 05/26/2022 1.8  0.5 - 4.6 K/UL Final    Absolute Mono # 05/26/2022 0.9 0.1 - 1.3 K/UL Final    Absolute Eos # 05/26/2022 0.1  0.0 - 0.8 K/UL Final    Basophils Absolute 05/26/2022 0.0  0.0 - 0.2 K/UL Final    Absolute Immature Granulocyte 05/26/2022 0.0  0.0 - 0.5 K/UL Final    Differential Type 05/26/2022 AUTOMATED    Final    Sodium 05/26/2022 138  136 - 145 mmol/L Final    Potassium 05/26/2022 4.0  3.5 - 5.1 mmol/L Final    Chloride 05/26/2022 103  98 - 107 mmol/L Final    CO2 05/26/2022 31  21 - 32 mmol/L Final    Anion Gap 05/26/2022 4* 7 - 16 mmol/L Final    Glucose 05/26/2022 106* 65 - 100 mg/dL Final    BUN 05/26/2022 13  6 - 23 MG/DL Final    CREATININE 05/26/2022 0.80  0.6 - 1.0 MG/DL Final    GFR  05/26/2022 >60  >60 ml/min/1.73m2 Final    GFR Non- 05/26/2022 >60  >60 ml/min/1.73m2 Final    Comment:      Estimated GFR is calculated using the Modification of Diet in Renal Disease (MDRD) Study equation, reported for both  Americans (GFRAA) and non- Americans (GFRNA), and normalized to 1.73m2 body surface area. The physician must decide which value applies to the patient. The MDRD study equation should only be used in individuals age 25 or older. It has not been validated for the following: pregnant women, patients with serious comorbid conditions,or on certain medications, or persons with extremes of body size, muscle mass, or nutritional status.       Calcium 05/26/2022 8.7  8.3 - 10.4 MG/DL Final    Total Bilirubin 05/26/2022 0.3  0.2 - 1.1 MG/DL Final    ALT 05/26/2022 26  12 - 65 U/L Final    AST 05/26/2022 23  15 - 37 U/L Final    Alk Phosphatase 05/26/2022 77  50 - 136 U/L Final    Total Protein 05/26/2022 7.5  6.3 - 8.2 g/dL Final    Albumin 05/26/2022 3.7  3.5 - 5.0 g/dL Final    Globulin 05/26/2022 3.8* 2.3 - 3.5 g/dL Final    Albumin/Globulin Ratio 05/26/2022 1.0* 1.2 - 3.5   Final    Magnesium 05/26/2022 2.0  1.8 - 2.4 mg/dL Final    CEA 05/26/2022 2.6  0.0 - 3.0 ng/mL Final    Comment: Nonsmoker:  <3.0 ng/mL  Smoker:     <5.0 ng/mL  [employer]. Patient's results of tumor marker testing may not be comparable to labs using different manufacturers/methods.  Color, UA 05/26/2022 YELLOW    Final    Appearance 05/26/2022 HAZY    Final    Specific Gravity, UA 05/26/2022 1.020  1.001 - 1.023   Final    pH, Urine 05/26/2022 5.0  5.0 - 9.0   Final    Protein, UA 05/26/2022 100* NEG mg/dL Final    Glucose, UA 05/26/2022 Negative  NEG mg/dL Final    Ketones, Urine 05/26/2022 Negative  NEG mg/dL Final    Bilirubin Urine 05/26/2022 Negative  NEG   Final    Blood, Urine 05/26/2022 LARGE* NEG   Final    Urobilinogen, Urine 05/26/2022 0.2  0.2 - 1.0 EU/dL Final    Nitrite, Urine 05/26/2022 Negative  NEG   Final    Leukocyte Esterase, Urine 05/26/2022 SMALL* NEG   Final    Protein, Urine, Random 05/26/2022 133* <11.9 mg/dL Final    Creatinine, Ur 05/26/2022 73.00  mg/dL Final    PROTEIN/CREAT RATIO URINE RAN 05/26/2022 1.8    Final    WBC, UA 05/26/2022 10-20  0 /hpf Final    RBC, UA 05/26/2022   0 /hpf Final    Epithelial Cells UA 05/26/2022 0-3  0 /hpf Final    BACTERIA, URINE 05/26/2022 1+* 0 /hpf Final    Casts 05/26/2022 0  0 /lpf Final    Crystals 05/26/2022 0  0 /LPF Final    AMORPHOUS CRYSTAL 05/26/2022 1+* 0 Final    Mucus, UA 05/26/2022 TRACE  0 /lpf Final          Above results reviewed with patient. ASSESSMENT:   Ms. Wanda Banuelos has an apparent metastatic carcinoma of unknown primary. Pathologically, the tumor seems to resemble a process arising in the upper gastrointestinal tract. However, any such primary is  not visible on EGD or PET scanning. Dr. Martha Martino presumption is that this may have originated from the colon based on its location and behavior. That too would be somewhat inconsistent with the pathology findings. This does not appear to be a primary  peritoneal carcinoma either.   She was empirically placed on FOLFOX and bevacizumab in April 2022. She is tolerating the chemotherapy well and at least subjectively seems to be somewhat better. She had a variety of questions about her follow-up and monitoring  of the condition. Admittedly, we are at a bit of a loss. She will undergo routine radiographs that her disease is not measurable. In addition, she has no elevation in tumor markers to follow. However I do believe given her changes in symptoms that  she is enjoying some benefit from treatment thus far. Caris analysis has shown no drivable mutations. PLAN:  Labs reviewed, urine protein/creatine ratio 1.8, OK to proceed with cycle 5 FOLFOX with Avastin tomorrow as planned. She will have restaging imaging following cycle 6. Continue with good nutrition and hydration. Frequent activity with rest periods can help combat fatigue. Return for follow up and C6 in 2 weeks or sooner if needed.            RESUSCITATION DIRECTIVES/HOSPICE CARE;   Full Support            Gayathri Garcia (Monique Baldwin) 1975 [address] [place] [address] Hematology and Oncology  [address]  Office : [phone]  Fax : [phone]

## 2022-05-27 ENCOUNTER — HOSPITAL ENCOUNTER (OUTPATIENT)
Dept: INFUSION THERAPY | Age: 47
Discharge: HOME | End: 2022-05-27
Payer: COMMERCIAL

## 2022-05-27 VITALS
DIASTOLIC BLOOD PRESSURE: 76 MMHG | SYSTOLIC BLOOD PRESSURE: 130 MMHG | RESPIRATION RATE: 18 BRPM | TEMPERATURE: 98.2 F | OXYGEN SATURATION: 98 % | BODY MASS INDEX: 20.19 KG/M2 | WEIGHT: 117.6 LBS | HEART RATE: 68 BPM

## 2022-05-27 DIAGNOSIS — C78.6 METASTASIS TO PERITONEUM OF UNKNOWN PRIMARY (HCC): ICD-10-CM

## 2022-05-27 DIAGNOSIS — C80.1 METASTASIS TO PERITONEUM OF UNKNOWN PRIMARY (HCC): Primary | ICD-10-CM

## 2022-05-27 DIAGNOSIS — C78.6 METASTASIS TO PERITONEUM OF UNKNOWN PRIMARY (HCC): Primary | ICD-10-CM

## 2022-05-27 DIAGNOSIS — C80.1 CARCINOMA OF UNKNOWN PRIMARY (HCC): Primary | ICD-10-CM

## 2022-05-27 DIAGNOSIS — C80.1 METASTASIS TO PERITONEUM OF UNKNOWN PRIMARY (HCC): ICD-10-CM

## 2022-05-27 PROCEDURE — 96415 CHEMO IV INFUSION ADDL HR: CPT

## 2022-05-27 PROCEDURE — 96413 CHEMO IV INFUSION 1 HR: CPT

## 2022-05-27 PROCEDURE — 96417 CHEMO IV INFUS EACH ADDL SEQ: CPT

## 2022-05-27 PROCEDURE — 96411 CHEMO IV PUSH ADDL DRUG: CPT

## 2022-05-27 PROCEDURE — G0498 CHEMO EXTEND IV INFUS W/PUMP: HCPCS

## 2022-05-27 PROCEDURE — 6360000002 HC RX W HCPCS: Performed by: NURSE PRACTITIONER

## 2022-05-27 PROCEDURE — 96368 THER/DIAG CONCURRENT INF: CPT

## 2022-05-27 PROCEDURE — 2580000003 HC RX 258: Performed by: NURSE PRACTITIONER

## 2022-05-27 PROCEDURE — 96375 TX/PRO/DX INJ NEW DRUG ADDON: CPT

## 2022-05-27 RX ORDER — SODIUM CHLORIDE 0.9 % (FLUSH) 0.9 %
5-40 SYRINGE (ML) INJECTION PRN
Status: DISCONTINUED | OUTPATIENT
Start: 2022-05-27 | End: 2022-05-28 | Stop reason: HOSPADM

## 2022-05-27 RX ORDER — SODIUM CHLORIDE 9 MG/ML
5-250 INJECTION, SOLUTION INTRAVENOUS PRN
Status: DISCONTINUED | OUTPATIENT
Start: 2022-05-27 | End: 2022-05-28 | Stop reason: HOSPADM

## 2022-05-27 RX ORDER — FLUOROURACIL 50 MG/ML
400 INJECTION, SOLUTION INTRAVENOUS ONCE
Status: COMPLETED | OUTPATIENT
Start: 2022-05-27 | End: 2022-05-27

## 2022-05-27 RX ORDER — DEXTROSE MONOHYDRATE 50 MG/ML
5-250 INJECTION, SOLUTION INTRAVENOUS PRN
Status: DISCONTINUED | OUTPATIENT
Start: 2022-05-27 | End: 2022-05-28 | Stop reason: HOSPADM

## 2022-05-27 RX ORDER — ONDANSETRON 2 MG/ML
8 INJECTION INTRAMUSCULAR; INTRAVENOUS ONCE
Status: COMPLETED | OUTPATIENT
Start: 2022-05-27 | End: 2022-05-27

## 2022-05-27 RX ADMIN — OXALIPLATIN 135 MG: 5 INJECTION, SOLUTION INTRAVENOUS at 11:38

## 2022-05-27 RX ADMIN — LEUCOVORIN CALCIUM 650 MG: 350 INJECTION, POWDER, LYOPHILIZED, FOR SOLUTION INTRAMUSCULAR; INTRAVENOUS at 11:38

## 2022-05-27 RX ADMIN — DEXAMETHASONE SODIUM PHOSPHATE 12 MG: 4 INJECTION, SOLUTION INTRAMUSCULAR; INTRAVENOUS at 11:06

## 2022-05-27 RX ADMIN — SODIUM CHLORIDE 275 MG: 9 INJECTION, SOLUTION INTRAVENOUS at 10:26

## 2022-05-27 RX ADMIN — ONDANSETRON 8 MG: 2 INJECTION INTRAMUSCULAR; INTRAVENOUS at 11:01

## 2022-05-27 RX ADMIN — FLUOROURACIL 625 MG: 50 INJECTION, SOLUTION INTRAVENOUS at 13:42

## 2022-05-27 RX ADMIN — FLUOROURACIL 3825 MG: 50 INJECTION, SOLUTION INTRAVENOUS at 13:45

## 2022-05-27 NOTE — PROGRESS NOTES
Nutrition F/U:  Assessment:  Pt seen during office visit w/ NP, receiving her 5th dose of FOLFOX + Avastin tomorrow. Pt continues to tolerate chemo well, tolerating a Regular diet w/ good appetite/po intake, taking stool softeners prn. Current BW: 117#, stable. Intervention:  1. Continue w/ Regular diet  2. Stool softeners prn  3. Repeat scans after cycle 6    Monitoring/Evaluation:  1. RD to follow up during next office visit - follow up wt status, tolerance/intake of po diet, symptom management.       [facility], [address], [address] [facility]

## 2022-05-27 NOTE — PLAN OF CARE
Problem: Infection - Adult  Goal: Absence of infection at discharge  Outcome: Progressing  Goal: Absence of infection during hospitalization  Outcome: Progressing  Goal: Absence of fever/infection during anticipated neutropenic period  Outcome: Progressing     Problem: Metabolic/Fluid and Electrolytes - Adult  Goal: Electrolytes maintained within normal limits  Outcome: Progressing  Goal: Hemodynamic stability and optimal renal function maintained  Outcome: Progressing  Goal: Glucose maintained within prescribed range  Outcome: Progressing     Problem: Hematologic - Adult  Goal: Maintains hematologic stability  Outcome: Progressing

## 2022-05-27 NOTE — PROGRESS NOTES
Arrived to the [facility]. Avastin/Folfox infusion completed. Pump placed at 1345. Patient tolerated well. Any issues or concerns during appointment: none. Patient aware of next infusion appointment on 05/29/2022 (date) at [facility] (time). Discharged ambulatory.

## 2022-05-29 ENCOUNTER — HOSPITAL ENCOUNTER (OUTPATIENT)
Dept: INFUSION THERAPY | Age: 47
End: 2022-05-29

## 2022-05-29 ENCOUNTER — HOSPITAL ENCOUNTER (OUTPATIENT)
Dept: INFUSION THERAPY | Age: 47
Discharge: HOME | End: 2022-05-29
Payer: COMMERCIAL

## 2022-05-29 VITALS
SYSTOLIC BLOOD PRESSURE: 147 MMHG | TEMPERATURE: 98.1 F | DIASTOLIC BLOOD PRESSURE: 91 MMHG | HEART RATE: 64 BPM | RESPIRATION RATE: 18 BRPM

## 2022-05-29 DIAGNOSIS — C80.1 METASTASIS TO PERITONEUM OF UNKNOWN PRIMARY (HCC): ICD-10-CM

## 2022-05-29 DIAGNOSIS — C78.6 METASTASIS TO PERITONEUM OF UNKNOWN PRIMARY (HCC): ICD-10-CM

## 2022-05-29 DIAGNOSIS — C80.1 CARCINOMA OF UNKNOWN PRIMARY (HCC): Primary | ICD-10-CM

## 2022-05-29 PROCEDURE — 96523 IRRIG DRUG DELIVERY DEVICE: CPT

## 2022-05-29 PROCEDURE — 2580000003 HC RX 258: Performed by: NURSE PRACTITIONER

## 2022-05-29 RX ORDER — SODIUM CHLORIDE 0.9 % (FLUSH) 0.9 %
5-40 SYRINGE (ML) INJECTION PRN
Status: DISCONTINUED | OUTPATIENT
Start: 2022-05-29 | End: 2022-05-30 | Stop reason: HOSPADM

## 2022-05-29 RX ADMIN — Medication 10 ML: at 12:05

## 2022-06-02 ENCOUNTER — HOSPITAL ENCOUNTER (OUTPATIENT)
Dept: LAB | Age: 47
Discharge: HOME | End: 2022-06-05
Payer: COMMERCIAL

## 2022-06-02 DIAGNOSIS — R31.9 HEMATURIA, UNSPECIFIED TYPE: ICD-10-CM

## 2022-06-02 DIAGNOSIS — R31.9 HEMATURIA, UNSPECIFIED TYPE: Primary | ICD-10-CM

## 2022-06-02 LAB
APPEARANCE UR: ABNORMAL
BACTERIA URNS QL MICRO: ABNORMAL /HPF
BILIRUB UR QL: NEGATIVE
CASTS URNS QL MICRO: 0 /LPF
COLOR UR: YELLOW
CRYSTALS URNS QL MICRO: 0 /LPF
EPI CELLS #/AREA URNS HPF: ABNORMAL /HPF
GLUCOSE UR STRIP.AUTO-MCNC: NEGATIVE MG/DL
HGB UR QL STRIP: ABNORMAL
KETONES UR QL STRIP.AUTO: NEGATIVE MG/DL
LEUKOCYTE ESTERASE UR QL STRIP.AUTO: ABNORMAL
MUCOUS THREADS URNS QL MICRO: 0 /LPF
NITRITE UR QL STRIP.AUTO: NEGATIVE
PH UR STRIP: 5 [PH] (ref 5–9)
PROT UR STRIP-MCNC: ABNORMAL MG/DL
RBC #/AREA URNS HPF: >100 /HPF
SP GR UR REFRACTOMETRY: 1.02 (ref 1–1.02)
UROBILINOGEN UR QL STRIP.AUTO: 0.2 EU/DL (ref 0.2–1)
WBC URNS QL MICRO: ABNORMAL /HPF

## 2022-06-02 PROCEDURE — 81015 MICROSCOPIC EXAM OF URINE: CPT

## 2022-06-02 PROCEDURE — 87086 URINE CULTURE/COLONY COUNT: CPT

## 2022-06-02 PROCEDURE — 81001 URINALYSIS AUTO W/SCOPE: CPT

## 2022-06-04 LAB
BACTERIA SPEC CULT: NORMAL
SERVICE CMNT-IMP: NORMAL

## 2022-06-09 ENCOUNTER — HOSPITAL ENCOUNTER (OUTPATIENT)
Dept: LAB | Age: 47
Discharge: HOME | End: 2022-06-12
Payer: COMMERCIAL

## 2022-06-09 ENCOUNTER — OFFICE VISIT (OUTPATIENT)
Dept: ONCOLOGY | Age: 47
End: 2022-06-09
Payer: COMMERCIAL

## 2022-06-09 VITALS
HEIGHT: 64 IN | SYSTOLIC BLOOD PRESSURE: 135 MMHG | RESPIRATION RATE: 22 BRPM | WEIGHT: 115.1 LBS | TEMPERATURE: 97.9 F | HEART RATE: 75 BPM | OXYGEN SATURATION: 98 % | BODY MASS INDEX: 19.65 KG/M2 | DIASTOLIC BLOOD PRESSURE: 89 MMHG

## 2022-06-09 DIAGNOSIS — C78.6 METASTASIS TO PERITONEUM OF UNKNOWN PRIMARY (HCC): ICD-10-CM

## 2022-06-09 DIAGNOSIS — Z79.899 HIGH RISK MEDICATION USE: ICD-10-CM

## 2022-06-09 DIAGNOSIS — R53.83 FATIGUE DUE TO TREATMENT: ICD-10-CM

## 2022-06-09 DIAGNOSIS — C80.1 CARCINOMA OF UNKNOWN PRIMARY (HCC): Primary | ICD-10-CM

## 2022-06-09 DIAGNOSIS — C80.1 METASTASIS TO PERITONEUM OF UNKNOWN PRIMARY (HCC): ICD-10-CM

## 2022-06-09 DIAGNOSIS — T45.1X5A CHEMOTHERAPY-INDUCED NEUROPATHY (HCC): ICD-10-CM

## 2022-06-09 DIAGNOSIS — G62.0 CHEMOTHERAPY-INDUCED NEUROPATHY (HCC): ICD-10-CM

## 2022-06-09 LAB
ALBUMIN SERPL-MCNC: 3.5 G/DL (ref 3.5–5)
ALBUMIN/GLOB SERPL: 0.9 {RATIO} (ref 1.2–3.5)
ALP SERPL-CCNC: 68 U/L (ref 50–136)
ALT SERPL-CCNC: 22 U/L (ref 12–65)
ANION GAP SERPL CALC-SCNC: 6 MMOL/L (ref 7–16)
AST SERPL-CCNC: 19 U/L (ref 15–37)
BASOPHILS # BLD: 0 K/UL (ref 0–0.2)
BASOPHILS NFR BLD: 0 % (ref 0–2)
BILIRUB SERPL-MCNC: 0.3 MG/DL (ref 0.2–1.1)
BUN SERPL-MCNC: 11 MG/DL (ref 6–23)
CALCIUM SERPL-MCNC: 9 MG/DL (ref 8.3–10.4)
CHLORIDE SERPL-SCNC: 102 MMOL/L (ref 98–107)
CO2 SERPL-SCNC: 28 MMOL/L (ref 21–32)
CREAT SERPL-MCNC: 0.7 MG/DL (ref 0.6–1)
CREAT UR-MCNC: 64 MG/DL
DIFFERENTIAL METHOD BLD: ABNORMAL
EOSINOPHIL # BLD: 0.2 K/UL (ref 0–0.8)
EOSINOPHIL NFR BLD: 3 % (ref 0.5–7.8)
ERYTHROCYTE [DISTWIDTH] IN BLOOD BY AUTOMATED COUNT: 16.6 % (ref 11.9–14.6)
GLOBULIN SER CALC-MCNC: 3.7 G/DL (ref 2.3–3.5)
GLUCOSE SERPL-MCNC: 93 MG/DL (ref 65–100)
HCT VFR BLD AUTO: 39.2 % (ref 35.8–46.3)
HGB BLD-MCNC: 12.5 G/DL (ref 11.7–15.4)
IMM GRANULOCYTES # BLD AUTO: 0 K/UL (ref 0–0.5)
IMM GRANULOCYTES NFR BLD AUTO: 0 % (ref 0–5)
LYMPHOCYTES # BLD: 1.8 K/UL (ref 0.5–4.6)
LYMPHOCYTES NFR BLD: 31 % (ref 13–44)
MAGNESIUM SERPL-MCNC: 2 MG/DL (ref 1.8–2.4)
MCH RBC QN AUTO: 29.3 PG (ref 26.1–32.9)
MCHC RBC AUTO-ENTMCNC: 31.9 G/DL (ref 31.4–35)
MCV RBC AUTO: 91.8 FL (ref 79.6–97.8)
MONOCYTES # BLD: 1.2 K/UL (ref 0.1–1.3)
MONOCYTES NFR BLD: 20 % (ref 4–12)
NEUTS SEG # BLD: 2.8 K/UL (ref 1.7–8.2)
NEUTS SEG NFR BLD: 46 % (ref 43–78)
NRBC # BLD: 0 K/UL (ref 0–0.2)
PLATELET # BLD AUTO: 133 K/UL (ref 150–450)
PMV BLD AUTO: 10 FL (ref 9.4–12.3)
POTASSIUM SERPL-SCNC: 4.1 MMOL/L (ref 3.5–5.1)
PROT SERPL-MCNC: 7.2 G/DL (ref 6.3–8.2)
PROT UR-MCNC: 59 MG/DL
PROT/CREAT UR-RTO: 0.9
RBC # BLD AUTO: 4.27 M/UL (ref 4.05–5.2)
SODIUM SERPL-SCNC: 136 MMOL/L (ref 136–145)
WBC # BLD AUTO: 6 K/UL (ref 4.3–11.1)

## 2022-06-09 PROCEDURE — 85025 COMPLETE CBC W/AUTO DIFF WBC: CPT

## 2022-06-09 PROCEDURE — 80053 COMPREHEN METABOLIC PANEL: CPT

## 2022-06-09 PROCEDURE — 84156 ASSAY OF PROTEIN URINE: CPT

## 2022-06-09 PROCEDURE — 99214 OFFICE O/P EST MOD 30 MIN: CPT | Performed by: NURSE PRACTITIONER

## 2022-06-09 PROCEDURE — 83735 ASSAY OF MAGNESIUM: CPT

## 2022-06-09 PROCEDURE — 36415 COLL VENOUS BLD VENIPUNCTURE: CPT

## 2022-06-09 RX ORDER — DIPHENHYDRAMINE HYDROCHLORIDE 50 MG/ML
50 INJECTION INTRAMUSCULAR; INTRAVENOUS
Status: CANCELLED | OUTPATIENT
Start: 2022-06-10

## 2022-06-09 RX ORDER — POTASSIUM CHLORIDE 20 MEQ/1
20 TABLET, EXTENDED RELEASE ORAL 2 TIMES DAILY
Qty: 60 TABLET | Refills: 1 | Status: SHIPPED | OUTPATIENT
Start: 2022-06-09

## 2022-06-09 RX ORDER — MEPERIDINE HYDROCHLORIDE 50 MG/ML
12.5 INJECTION INTRAMUSCULAR; INTRAVENOUS; SUBCUTANEOUS PRN
Status: CANCELLED | OUTPATIENT
Start: 2022-06-10

## 2022-06-09 RX ORDER — DEXTROSE MONOHYDRATE 50 MG/ML
5-250 INJECTION, SOLUTION INTRAVENOUS PRN
Status: CANCELLED | OUTPATIENT
Start: 2022-06-10

## 2022-06-09 RX ORDER — SODIUM CHLORIDE 9 MG/ML
5-250 INJECTION, SOLUTION INTRAVENOUS PRN
Status: CANCELLED | OUTPATIENT
Start: 2022-06-12

## 2022-06-09 RX ORDER — ONDANSETRON 2 MG/ML
8 INJECTION INTRAMUSCULAR; INTRAVENOUS
Status: CANCELLED | OUTPATIENT
Start: 2022-06-10

## 2022-06-09 RX ORDER — ACETAMINOPHEN 325 MG/1
650 TABLET ORAL
Status: CANCELLED | OUTPATIENT
Start: 2022-06-10

## 2022-06-09 RX ORDER — EPINEPHRINE 1 MG/ML
0.3 INJECTION, SOLUTION, CONCENTRATE INTRAVENOUS PRN
Status: CANCELLED | OUTPATIENT
Start: 2022-06-10

## 2022-06-09 RX ORDER — SODIUM CHLORIDE 0.9 % (FLUSH) 0.9 %
5-40 SYRINGE (ML) INJECTION PRN
Status: CANCELLED | OUTPATIENT
Start: 2022-06-10

## 2022-06-09 RX ORDER — ALBUTEROL SULFATE 90 UG/1
4 AEROSOL, METERED RESPIRATORY (INHALATION) PRN
Status: CANCELLED | OUTPATIENT
Start: 2022-06-10

## 2022-06-09 RX ORDER — SODIUM CHLORIDE 9 MG/ML
INJECTION, SOLUTION INTRAVENOUS CONTINUOUS
Status: CANCELLED | OUTPATIENT
Start: 2022-06-10

## 2022-06-09 RX ORDER — HEPARIN SODIUM (PORCINE) LOCK FLUSH IV SOLN 100 UNIT/ML 100 UNIT/ML
500 SOLUTION INTRAVENOUS PRN
Status: CANCELLED | OUTPATIENT
Start: 2022-06-12

## 2022-06-09 RX ORDER — SODIUM CHLORIDE 9 MG/ML
5-40 INJECTION INTRAVENOUS PRN
Status: CANCELLED | OUTPATIENT
Start: 2022-06-12

## 2022-06-09 RX ORDER — SODIUM CHLORIDE 9 MG/ML
5-40 INJECTION INTRAVENOUS PRN
Status: CANCELLED | OUTPATIENT
Start: 2022-06-10

## 2022-06-09 RX ORDER — ONDANSETRON 2 MG/ML
8 INJECTION INTRAMUSCULAR; INTRAVENOUS ONCE
Status: CANCELLED | OUTPATIENT
Start: 2022-06-10 | End: 2022-06-10

## 2022-06-09 RX ORDER — SODIUM CHLORIDE 9 MG/ML
5-250 INJECTION, SOLUTION INTRAVENOUS PRN
Status: CANCELLED | OUTPATIENT
Start: 2022-06-10

## 2022-06-09 RX ORDER — FAMOTIDINE 10 MG/ML
20 INJECTION, SOLUTION INTRAVENOUS
Status: CANCELLED | OUTPATIENT
Start: 2022-06-10

## 2022-06-09 RX ORDER — FLUOROURACIL 50 MG/ML
400 INJECTION, SOLUTION INTRAVENOUS ONCE
Status: CANCELLED | OUTPATIENT
Start: 2022-06-10 | End: 2022-06-10

## 2022-06-09 RX ORDER — HEPARIN SODIUM (PORCINE) LOCK FLUSH IV SOLN 100 UNIT/ML 100 UNIT/ML
500 SOLUTION INTRAVENOUS PRN
Status: CANCELLED | OUTPATIENT
Start: 2022-06-10

## 2022-06-09 RX ORDER — SODIUM CHLORIDE 0.9 % (FLUSH) 0.9 %
5-40 SYRINGE (ML) INJECTION PRN
Status: CANCELLED | OUTPATIENT
Start: 2022-06-12

## 2022-06-09 ASSESSMENT — PATIENT HEALTH QUESTIONNAIRE - PHQ9
SUM OF ALL RESPONSES TO PHQ QUESTIONS 1-9: 0
SUM OF ALL RESPONSES TO PHQ QUESTIONS 1-9: 0
1. LITTLE INTEREST OR PLEASURE IN DOING THINGS: 0
SUM OF ALL RESPONSES TO PHQ QUESTIONS 1-9: 0
2. FEELING DOWN, DEPRESSED OR HOPELESS: 0
SUM OF ALL RESPONSES TO PHQ QUESTIONS 1-9: 0
SUM OF ALL RESPONSES TO PHQ9 QUESTIONS 1 & 2: 0

## 2022-06-09 NOTE — PROGRESS NOTES
[address] FOLLOW-UP VISIT         Patient Name: Aleshia Alfaro              Date of Visit: 2022   : 1975   Age:47 y.o. Presenting Complaint:   Aleshia Alfaro  is seen in follow-up for a carcinoma of unknown primary. History of Present Illness:   Ms. [facility] was seen for the first time in our office in 2022. She was a woman of previously good health who began noticing left-sided back discomfort in early 2022. This was associated  ultimately with a sense of difficulty swallowing and ultimately she presented to [facility] for evaluation. Her symptoms were felt to potentially be indicative of a bowel obstruction and she was sent for a CT scan. This study, performed on 2022  was notable for a linear calcific density along the course of the proximal left ureter with associated moderate hydronephrosis potentially indicative of an intraureteral calculus. There was also stranding throughout the retroperitoneal soft tissues anterior  to the left psoas muscle with pelvic ascites. There was also wall thickening involving the descending colon. The question of colitis or serosal implants was raised. The patient had undergone a gastric sleeve placement several years prior. She had  also undergone a negative colonoscopy about 3 years prior to these presentations. There was a history of anemia ultimately resulting in the performance of a hysterectomy approximately 3 years ago for menorrhagia. Because of the CT scan findings, she  was ultimately referred to Dr. Laura Pena for evaluation of a possible pelvic malignancy. On 2022 he performed a laparoscopy and biopsy with evidence of peritoneal carcinomatosis grossly. A \"peritoneal nodule\" and a \"peritoneal implant\"  were both positive for a poorly differentiated metastatic carcinoma potentially consistent with an upper gastrointestinal primary. An omental biopsy showed no evidence of malignancy. Immunohistochemistries were positive for cytokeratin 7 and negative  for cytokeratin 20. The tumor was weakly positive for CDX2. The only other positive study was MOC-31. A PET scan was subsequently performed on  showing elevated FDG uptake in the left pelvis corresponding with a masslike density concerning  for peritoneal carcinomatosis. There was a small volume of free fluid within the peritoneal cavity. There was moderate right hydroureteronephrosis. Despite the pathologic findings, there was no evidence of FDG activity in the upper gastrointestinal  tract. CA-125 was slightly elevated at 44. Given the uncertainty of her diagnosis, the patient went back for additional surgery on February 15 at which time she underwent bilateral ureteral stent placement and bilateral salpingo-oophorectomies. Pathology  from that surgery was notable for poorly differentiated carcinoma with occasional signet ring features. Immunohistochemical stains were most consistent with a tumor of the upper gastrointestinal tract. She is  1 para 1 abortus 0. There is no  family history of cancer. She has had no difficulties with vomiting. She still notes that some food traverses her esophagus slowly. She had undergone a prior dilatation without any evidence of cancer. She subsequently began treatment with FOLFOX ultimately  with the addition of bevacizumab bevacizumab in 2022. She returns for follow up and C6 FOLFOX/Avastin. She continues to tolerate treatment very well. There are no GI or bowel complaints. She is eating and drinking well, weight is stable. Energy level is OK. No cough, shortness of breath, or edema. Cold sensitivity is minimal.  There is mild intermittent neuropathy in fingertips, none in feet. Denies any pain or bleeding. There have been no fevers or infectious symptoms.         Medications:   Current Outpatient Medications Medication Sig Dispense Refill    potassium chloride (KLOR-CON M) 20 MEQ extended release tablet Take 1 tablet by mouth 2 times daily 60 tablet 1    acetaminophen (TYLENOL) 500 MG tablet Take by mouth every 6 hours as needed      ergocalciferol (ERGOCALCIFEROL) 1.25 MG (12959 UT) capsule Take 50,000 Units by mouth every 7 days      lidocaine-prilocaine (EMLA) 2.5-2.5 % cream Apply to port about 45 minutes prior to access      pantoprazole (PROTONIX) 40 MG tablet 2 times daily      valACYclovir (VALTREX) 500 MG tablet Take 500 mg by mouth daily      metoclopramide (REGLAN) 10 MG tablet Take 10 mg by mouth 4 times daily (before meals and nightly)      ondansetron (ZOFRAN) 8 MG tablet Take 8 mg by mouth every 8 hours as needed (Patient not taking: Reported on 2022)      oxybutynin (DITROPAN) 5 MG tablet Take 5 mg by mouth 3 times daily as needed (Patient not taking: Reported on 2022)       No current facility-administered medications for this visit. Allergies:  No Known Allergies      Review of Systems: The Review of Systems is documented in full in the internal medical record. All systems are negative other than for those noted above.        Past Medical History:  Past Medical History:   Diagnosis Date    Anemia     -- not a problem since hyst    Colon cancer ([facility] [address] dx 2022     plans for chemo--- followed by dr Jenn Bhardwaj [phone]    no hospitalization    GERD (gastroesophageal reflux disease)     managed with med    History of colonoscopy 2018    Dr. Eleanor Tay, nl (see media note), R     Hypotension     asymptomatic    Obstruction of fallopian tube     per pt has \"1 good tube\"    Weight loss     80lbs weight loss after gastric sleeve         Past Surgical History:  Past Surgical History:   Procedure Laterality Date     SECTION  2009    GASTRIC BYPASS SURGERY  2014    gastric sleeve- Choudhari    HYSTERECTOMY (CERVIX STATUS UNKNOWN)          HYSTERECTOMY (CERVIX STATUS UNKNOWN)  07/09/2020    [facility] w/ Bilateral salpingectomy and left oophorectomy    IR PORT PLACEMENT EQUAL OR GREATER THAN 5 YEARS  2/28/2022    IR PORT PLACEMENT EQUAL OR GREATER THAN 5 YEARS  2/28/2022    IR PORT PLACEMENT EQUAL OR GREATER THAN 5 YEARS 2/28/2022 [facility] SPECI[facility]    MYOMECTOMY  age [place] 21s\"    also \"unblocked her FT\"    TONSILLECTOMY      UPPER GASTROINTESTINAL ENDOSCOPY      with dilation    UROLOGICAL SURGERY Bilateral 03/15/2022    cysto          Social History:  Social History     Socioeconomic History    Marital status: Single     Spouse name: Not on file    Number of children: Not on file    Years of education: Not on file    Highest education level: Not on file   Occupational History    Not on file   Tobacco Use    Smoking status: Never Smoker    Smokeless tobacco: Never Used   Substance and Sexual Activity    Alcohol use: Not Currently    Drug use: No    Sexual activity: Not on file   Other Topics Concern    Not on file   Social History Narrative    1. Use large speculum. 2.  sister, Mahesh Mina #[id number] and mom is Adrian Rashid #[id number] (both Kofoed's pts)  3. PCP  Dr. Lester Zee ([facility]), GI Dr. Janelle Wasserman Bello-2018 normal EGD     Social Determinants of Health     Financial Resource Strain:     Difficulty of Paying Living Expenses: Not on file   Food Insecurity:     Worried About Running Out of Food in the Last Year: Not on file    Corbin of Food in the Last Year: Not on file   Transportation Needs:     Lack of Transportation (Medical): Not on file    Lack of Transportation (Non-Medical):  Not on file   Physical Activity:     Days of Exercise per Week: Not on file    Minutes of Exercise per Session: Not on file   Stress:     Feeling of Stress : Not on file   Social Connections:     Frequency of Communication with Friends and Family: Not on file    Frequency of Social Gatherings with Friends and Family: Not on file   Jackie Haro Attends Hoahaoism Services: Not on file    Active Member of Clubs or Organizations: Not on file    Attends Club or Organization Meetings: Not on file    Marital Status: Not on file   Intimate Partner Violence:     Fear of Current or Ex-Partner: Not on file    Emotionally Abused: Not on file    Physically Abused: Not on file    Sexually Abused: Not on file   Housing Stability:     Unable to Pay for Housing in the Last Year: Not on file    Number of [place] in the Last Year: Not on file    Unstable Housing in the Last Year: Not on file       Family History:  Family History   Problem Relation Age of Onset    Heart Disease Maternal Grandmother     Hypertension Maternal Grandmother     Heart Disease Maternal Grandfather     Hypertension Maternal Grandfather     Pulmonary Embolism Neg Hx     Colon Cancer Neg Hx     Deep Vein Thrombosis Neg Hx     Ovarian Cancer Neg Hx     Prostate Cancer Neg Hx     Breast Cancer Neg Hx           Physical Examination:   General Appearance: Well appearing appearing patient in no acute distress.     Vital signs:      Vitals:    06/09/22 1418   BP: 135/89   Site: Left Upper Arm   Position: Sitting   Cuff Size: Medium Adult   Pulse: 75   Resp: 22   Temp: 97.9 °F (36.6 °C)   TempSrc: Oral   SpO2: 98%   Weight: 115 lb 1.6 oz (52.2 kg)   Height: 5' 4\" (1.626 m)   Pain Score:   0 - No pain (fatigue 0 )       Performance Status: ECOG Level 1   Distress Screening Score:  PHQ-9  6/9/2022   Little interest or pleasure in doing things 0   Little interest or pleasure in doing things -   Feeling down, depressed, or hopeless 0   Trouble falling or staying asleep, or sleeping too much -   Feeling tired or having little energy -   Poor appetite, weight loss, or overeating -   Feeling bad about yourself - or that you are a failure or have let yourself or your family down -   Trouble concentrating on things such as school, work, reading, or watching TV -   Moving or speaking so slowly that other people could have noticed; or the opposite being so fidgety that others notice -   Thoughts of being better off dead, or hurting yourself in some way -   PHQ-2 Score 0   Total Score PHQ 2 -   PHQ-9 Total Score 0   PHQ 9 Score -          HEENT: No oral sores or thrush. Neck: Supple. There is no thyromegaly. Lymph nodes: Deferred. Breasts: Not examined   Lungs: The lungs are clear to auscultation. There is no chest wall tenderness and no  use of accessory respiratory musculature. Heart: There is no jugular venous distention. The rate is normal and rhythm regular. The S1 and S2 are normal and there are no murmurs or rubs. Abdomen: Soft, slightly tender, bowel sounds present and normal   Skin: No rash, petechiae or ecchymoses. No evidence of malignancy. +hyperpigmentation of palms    Extremities: No cyanosis, clubbing or edema.           Labs/Imaging:  Hospital Outpatient Visit on 06/09/2022   Component Date Value Ref Range Status    WBC 06/09/2022 6.0  4.3 - 11.1 K/uL Final    RBC 06/09/2022 4.27  4.05 - 5.2 M/uL Final    Hemoglobin 06/09/2022 12.5  11.7 - 15.4 g/dL Final    Hematocrit 06/09/2022 39.2  35.8 - 46.3 % Final    MCV 06/09/2022 91.8  79.6 - 97.8 FL Final    MCH 06/09/2022 29.3  26.1 - 32.9 PG Final    MCHC 06/09/2022 31.9  31.4 - 35.0 g/dL Final    RDW 06/09/2022 16.6* 11.9 - 14.6 % Final    Platelets 07/37/1591 133* 150 - 450 K/uL Final    MPV 06/09/2022 10.0  9.4 - 12.3 FL Final    nRBC 06/09/2022 0.00  0.0 - 0.2 K/uL Final    **Note: Absolute NRBC parameter is now reported with Hemogram**    Seg Neutrophils 06/09/2022 46  43 - 78 % Final    Lymphocytes 06/09/2022 31  13 - 44 % Final    Monocytes 06/09/2022 20* 4.0 - 12.0 % Final    Eosinophils % 06/09/2022 3  0.5 - 7.8 % Final    Basophils 06/09/2022 0  0.0 - 2.0 % Final    Immature Granulocytes 06/09/2022 0  0.0 - 5.0 % Final    Segs Absolute 06/09/2022 2.8  1.7 - 8.2 K/UL Final    Absolute Lymph # 06/09/2022 1.8  0.5 - 4.6 K/UL Final    Absolute Mono # 06/09/2022 1.2  0.1 - 1.3 K/UL Final    Absolute Eos # 06/09/2022 0.2  0.0 - 0.8 K/UL Final    Basophils Absolute 06/09/2022 0.0  0.0 - 0.2 K/UL Final    Absolute Immature Granulocyte 06/09/2022 0.0  0.0 - 0.5 K/UL Final    Differential Type 06/09/2022 AUTOMATED    Final    Sodium 06/09/2022 136  136 - 145 mmol/L Final    Potassium 06/09/2022 4.1  3.5 - 5.1 mmol/L Final    Chloride 06/09/2022 102  98 - 107 mmol/L Final    CO2 06/09/2022 28  21 - 32 mmol/L Final    Anion Gap 06/09/2022 6* 7 - 16 mmol/L Final    Glucose 06/09/2022 93  65 - 100 mg/dL Final    BUN 06/09/2022 11  6 - 23 MG/DL Final    CREATININE 06/09/2022 0.70  0.6 - 1.0 MG/DL Final    GFR  06/09/2022 >60  >60 ml/min/1.73m2 Final    GFR Non- 06/09/2022 >60  >60 ml/min/1.73m2 Final    Comment:      Estimated GFR is calculated using the Modification of Diet in Renal Disease (MDRD) Study equation, reported for both  Americans (GFRAA) and non- Americans (GFRNA), and normalized to 1.73m2 body surface area. The physician must decide which value applies to the patient. The MDRD study equation should only be used in individuals age 25 or older. It has not been validated for the following: pregnant women, patients with serious comorbid conditions,or on certain medications, or persons with extremes of body size, muscle mass, or nutritional status.       Calcium 06/09/2022 9.0  8.3 - 10.4 MG/DL Final    Total Bilirubin 06/09/2022 0.3  0.2 - 1.1 MG/DL Final    ALT 06/09/2022 22  12 - 65 U/L Final    AST 06/09/2022 19  15 - 37 U/L Final    Alk Phosphatase 06/09/2022 68  50 - 136 U/L Final    Total Protein 06/09/2022 7.2  6.3 - 8.2 g/dL Final    Albumin 06/09/2022 3.5  3.5 - 5.0 g/dL Final    Globulin 06/09/2022 3.7* 2.3 - 3.5 g/dL Final    Albumin/Globulin Ratio 06/09/2022 0.9* 1.2 - 3.5   Final    Magnesium 06/09/2022 2.0  1.8 - 2.4 mg/dL Final    Protein, Urine, Random 06/09/2022 59* <11.9 mg/dL Final    Creatinine, Ur 06/09/2022 64.00  mg/dL Final    PROTEIN/CREAT RATIO URINE RAN 06/09/2022 0.9    Final          Above results reviewed with patient. ASSESSMENT:   Ms. Karley Herron has an apparent metastatic carcinoma of unknown primary. Pathologically, the tumor seems to resemble a process arising in the upper gastrointestinal tract. However, any such primary is  not visible on EGD or PET scanning. Dr. Pacheco Fears presumption is that this may have originated from the colon based on its location and behavior. That too would be somewhat inconsistent with the pathology findings. This does not appear to be a primary  peritoneal carcinoma either. She was empirically placed on FOLFOX and bevacizumab in April 2022. She is tolerating the chemotherapy well and at least subjectively seems to be somewhat better. She had a variety of questions about her follow-up and monitoring  of the condition. Admittedly, we are at a bit of a loss. She will undergo routine radiographs that her disease is not measurable. In addition, she has no elevation in tumor markers to follow. However I do believe given her changes in symptoms that  she is enjoying some benefit from treatment thus far. Caris analysis has shown no drivable mutations. PLAN:  Proceed with C6 FOLFOX/Avastin as planned. Continue with good nutrition and hydration. Frequent activity with rest periods can help combat fatigue. Return for follow up in 2 weeks with restaging CT prior.            RESUSCITATION DIRECTIVES/HOSPICE CARE;   Full Support            Lebron Watters (Emilia Najera) [address] [place] [address] Hematology and Oncology  [address] [place] [address]  Office : [phone]  Fax : [phone]

## 2022-06-09 NOTE — PATIENT INSTRUCTIONS
Patient Instructions from Today's Visit    Reason for Visit:  Prechemo    Plan:  Proceed with treatment. Labs are adequate for treatment.     Follow Up:  Office visit as scheduled    Recent Lab Results:  Hospital Outpatient Visit on 06/09/2022   Component Date Value Ref Range Status    WBC 06/09/2022 6.0  4.3 - 11.1 K/uL Final    RBC 06/09/2022 4.27  4.05 - 5.2 M/uL Final    Hemoglobin 06/09/2022 12.5  11.7 - 15.4 g/dL Final    Hematocrit 06/09/2022 39.2  35.8 - 46.3 % Final    MCV 06/09/2022 91.8  79.6 - 97.8 FL Final    MCH 06/09/2022 29.3  26.1 - 32.9 PG Final    MCHC 06/09/2022 31.9  31.4 - 35.0 g/dL Final    RDW 06/09/2022 16.6* 11.9 - 14.6 % Final    Platelets 50/04/7303 133* 150 - 450 K/uL Final    MPV 06/09/2022 10.0  9.4 - 12.3 FL Final    nRBC 06/09/2022 0.00  0.0 - 0.2 K/uL Final    **Note: Absolute NRBC parameter is now reported with Hemogram**    Seg Neutrophils 06/09/2022 46  43 - 78 % Final    Lymphocytes 06/09/2022 31  13 - 44 % Final    Monocytes 06/09/2022 20* 4.0 - 12.0 % Final    Eosinophils % 06/09/2022 3  0.5 - 7.8 % Final    Basophils 06/09/2022 0  0.0 - 2.0 % Final    Immature Granulocytes 06/09/2022 0  0.0 - 5.0 % Final    Segs Absolute 06/09/2022 2.8  1.7 - 8.2 K/UL Final    Absolute Lymph # 06/09/2022 1.8  0.5 - 4.6 K/UL Final    Absolute Mono # 06/09/2022 1.2  0.1 - 1.3 K/UL Final    Absolute Eos # 06/09/2022 0.2  0.0 - 0.8 K/UL Final    Basophils Absolute 06/09/2022 0.0  0.0 - 0.2 K/UL Final    Absolute Immature Granulocyte 06/09/2022 0.0  0.0 - 0.5 K/UL Final    Differential Type 06/09/2022 AUTOMATED    Final    Sodium 06/09/2022 136  136 - 145 mmol/L Final    Potassium 06/09/2022 4.1  3.5 - 5.1 mmol/L Final    Chloride 06/09/2022 102  98 - 107 mmol/L Final    CO2 06/09/2022 28  21 - 32 mmol/L Final    Anion Gap 06/09/2022 6* 7 - 16 mmol/L Final    Glucose 06/09/2022 93  65 - 100 mg/dL Final    BUN 06/09/2022 11  6 - 23 MG/DL Final    CREATININE 06/09/2022 0.70  0.6 - 1.0 MG/DL Final    GFR  06/09/2022 >60  >60 ml/min/1.73m2 Final    GFR Non- 06/09/2022 >60  >60 ml/min/1.73m2 Final    Comment:      Estimated GFR is calculated using the Modification of Diet in Renal Disease (MDRD) Study equation, reported for both  Americans (GFRAA) and non- Americans (GFRNA), and normalized to 1.73m2 body surface area. The physician must decide which value applies to the patient. The MDRD study equation should only be used in individuals age 25 or older. It has not been validated for the following: pregnant women, patients with serious comorbid conditions,or on certain medications, or persons with extremes of body size, muscle mass, or nutritional status.  Calcium 06/09/2022 9.0  8.3 - 10.4 MG/DL Final    Total Bilirubin 06/09/2022 0.3  0.2 - 1.1 MG/DL Final    ALT 06/09/2022 22  12 - 65 U/L Final    AST 06/09/2022 19  15 - 37 U/L Final    Alk Phosphatase 06/09/2022 68  50 - 136 U/L Final    Total Protein 06/09/2022 7.2  6.3 - 8.2 g/dL Final    Albumin 06/09/2022 3.5  3.5 - 5.0 g/dL Final    Globulin 06/09/2022 3.7* 2.3 - 3.5 g/dL Final    Albumin/Globulin Ratio 06/09/2022 0.9* 1.2 - 3.5   Final    Magnesium 06/09/2022 2.0  1.8 - 2.4 mg/dL Final         Treatment Summary has been discussed and given to patient: n/a        -------------------------------------------------------------------------------------------------------------------  Please call our office at [phone] if you have any  of the following symptoms:   · Fever of 100.5 or greater  · Chills  · Shortness of breath  · Swelling or pain in one leg    After office hours an answering service is available and will contact a provider for emergencies or if you are experiencing any of the above symptoms.  Patient does express an interest in My Chart.   My Chart log in information explained on the after visit summary printout at the check-out john.    Amira Granados, RN  Nurse Navigator  [id number] [facility] [address] [phone]

## 2022-06-10 ENCOUNTER — HOSPITAL ENCOUNTER (OUTPATIENT)
Dept: INFUSION THERAPY | Age: 47
Setting detail: INFUSION SERIES
Discharge: HOME | End: 2022-06-10
Payer: COMMERCIAL

## 2022-06-10 VITALS
RESPIRATION RATE: 18 BRPM | BODY MASS INDEX: 19.84 KG/M2 | OXYGEN SATURATION: 99 % | DIASTOLIC BLOOD PRESSURE: 85 MMHG | HEART RATE: 79 BPM | WEIGHT: 115.6 LBS | TEMPERATURE: 97.9 F | SYSTOLIC BLOOD PRESSURE: 118 MMHG

## 2022-06-10 DIAGNOSIS — C80.1 METASTASIS TO PERITONEUM OF UNKNOWN PRIMARY (HCC): ICD-10-CM

## 2022-06-10 DIAGNOSIS — C78.6 METASTASIS TO PERITONEUM OF UNKNOWN PRIMARY (HCC): ICD-10-CM

## 2022-06-10 DIAGNOSIS — C80.1 CARCINOMA OF UNKNOWN PRIMARY (HCC): Primary | ICD-10-CM

## 2022-06-10 PROCEDURE — G0498 CHEMO EXTEND IV INFUS W/PUMP: HCPCS

## 2022-06-10 PROCEDURE — 96415 CHEMO IV INFUSION ADDL HR: CPT

## 2022-06-10 PROCEDURE — 96417 CHEMO IV INFUS EACH ADDL SEQ: CPT

## 2022-06-10 PROCEDURE — 6360000002 HC RX W HCPCS: Performed by: NURSE PRACTITIONER

## 2022-06-10 PROCEDURE — 96413 CHEMO IV INFUSION 1 HR: CPT

## 2022-06-10 PROCEDURE — 96375 TX/PRO/DX INJ NEW DRUG ADDON: CPT

## 2022-06-10 PROCEDURE — 96368 THER/DIAG CONCURRENT INF: CPT

## 2022-06-10 PROCEDURE — 2580000003 HC RX 258: Performed by: NURSE PRACTITIONER

## 2022-06-10 PROCEDURE — 96411 CHEMO IV PUSH ADDL DRUG: CPT

## 2022-06-10 RX ORDER — FLUOROURACIL 50 MG/ML
400 INJECTION, SOLUTION INTRAVENOUS ONCE
Status: COMPLETED | OUTPATIENT
Start: 2022-06-10 | End: 2022-06-10

## 2022-06-10 RX ORDER — DEXTROSE MONOHYDRATE 50 MG/ML
5-250 INJECTION, SOLUTION INTRAVENOUS PRN
Status: DISCONTINUED | OUTPATIENT
Start: 2022-06-10 | End: 2022-06-11 | Stop reason: HOSPADM

## 2022-06-10 RX ORDER — SODIUM CHLORIDE 0.9 % (FLUSH) 0.9 %
5-40 SYRINGE (ML) INJECTION PRN
Status: DISCONTINUED | OUTPATIENT
Start: 2022-06-10 | End: 2022-06-11 | Stop reason: HOSPADM

## 2022-06-10 RX ORDER — SODIUM CHLORIDE 9 MG/ML
5-250 INJECTION, SOLUTION INTRAVENOUS PRN
Status: DISCONTINUED | OUTPATIENT
Start: 2022-06-10 | End: 2022-06-11 | Stop reason: HOSPADM

## 2022-06-10 RX ORDER — ONDANSETRON 2 MG/ML
8 INJECTION INTRAMUSCULAR; INTRAVENOUS ONCE
Status: COMPLETED | OUTPATIENT
Start: 2022-06-10 | End: 2022-06-10

## 2022-06-10 RX ADMIN — DEXAMETHASONE SODIUM PHOSPHATE 12 MG: 4 INJECTION, SOLUTION INTRAMUSCULAR; INTRAVENOUS at 10:55

## 2022-06-10 RX ADMIN — DEXTROSE MONOHYDRATE 25 ML/HR: 50 INJECTION, SOLUTION INTRAVENOUS at 10:43

## 2022-06-10 RX ADMIN — OXALIPLATIN 135 MG: 5 INJECTION, SOLUTION INTRAVENOUS at 11:45

## 2022-06-10 RX ADMIN — FLUOROURACIL 625 MG: 50 INJECTION, SOLUTION INTRAVENOUS at 13:50

## 2022-06-10 RX ADMIN — SODIUM CHLORIDE 275 MG: 9 INJECTION, SOLUTION INTRAVENOUS at 10:10

## 2022-06-10 RX ADMIN — LEUCOVORIN CALCIUM 650 MG: 350 INJECTION, POWDER, LYOPHILIZED, FOR SOLUTION INTRAMUSCULAR; INTRAVENOUS at 11:45

## 2022-06-10 RX ADMIN — FLUOROURACIL 3825 MG: 50 INJECTION, SOLUTION INTRAVENOUS at 13:55

## 2022-06-10 RX ADMIN — SODIUM CHLORIDE, PRESERVATIVE FREE 10 ML: 5 INJECTION INTRAVENOUS at 09:20

## 2022-06-10 RX ADMIN — SODIUM CHLORIDE 50 ML/HR: 9 INJECTION, SOLUTION INTRAVENOUS at 09:21

## 2022-06-10 RX ADMIN — ONDANSETRON 8 MG: 2 INJECTION INTRAMUSCULAR; INTRAVENOUS at 10:45

## 2022-06-10 NOTE — PROGRESS NOTES
6/10/2022  Pt ambulatory to Infusion without complaints. Labs reviewed and pt received treatment per order, tolerated well. Adrucil pump attached and verified clamps open prior to discharge. Patient instructed to call provider with temperature of 100.4 or greater, nausea/vomiting/diarrhea/pain not controlled by medications, or any other issues/concerns. Aware of next Infusion appt on [email or web address]. Discharged home without complaints.

## 2022-06-12 ENCOUNTER — HOSPITAL ENCOUNTER (OUTPATIENT)
Dept: INFUSION THERAPY | Age: 47
Discharge: HOME | End: 2022-06-12
Payer: COMMERCIAL

## 2022-06-12 VITALS
OXYGEN SATURATION: 98 % | DIASTOLIC BLOOD PRESSURE: 97 MMHG | HEART RATE: 65 BPM | SYSTOLIC BLOOD PRESSURE: 144 MMHG | RESPIRATION RATE: 18 BRPM | TEMPERATURE: 98.7 F

## 2022-06-12 DIAGNOSIS — C78.6 METASTASIS TO PERITONEUM OF UNKNOWN PRIMARY (HCC): ICD-10-CM

## 2022-06-12 DIAGNOSIS — C80.1 METASTASIS TO PERITONEUM OF UNKNOWN PRIMARY (HCC): ICD-10-CM

## 2022-06-12 DIAGNOSIS — C80.1 CARCINOMA OF UNKNOWN PRIMARY (HCC): Primary | ICD-10-CM

## 2022-06-12 PROCEDURE — 96523 IRRIG DRUG DELIVERY DEVICE: CPT

## 2022-06-12 PROCEDURE — 2580000003 HC RX 258: Performed by: NURSE PRACTITIONER

## 2022-06-12 RX ORDER — SODIUM CHLORIDE 0.9 % (FLUSH) 0.9 %
5-40 SYRINGE (ML) INJECTION PRN
Status: DISCONTINUED | OUTPATIENT
Start: 2022-06-12 | End: 2022-06-13 | Stop reason: HOSPADM

## 2022-06-12 RX ADMIN — SODIUM CHLORIDE, PRESERVATIVE FREE 10 ML: 5 INJECTION INTRAVENOUS at 12:10

## 2022-06-12 NOTE — PROGRESS NOTES
Arrived to the [facility]. Assessment and continuous chemotherapy pump completed. Patient tolerated well. Any issues or concerns during appointment: No.  Patient aware of next infusion appointment on /24/22 @0900. Patient instructed to call provider with temperature of 100.4 or greater or nausea/vomiting/ diarrhea or pain not controlled by medications  Discharged ambulatory.

## 2022-06-22 ENCOUNTER — HOSPITAL ENCOUNTER (OUTPATIENT)
Dept: CT IMAGING | Age: 47
Discharge: HOME | End: 2022-06-25
Payer: COMMERCIAL

## 2022-06-22 DIAGNOSIS — C80.0 CARCINOMATOSIS (HCC): ICD-10-CM

## 2022-06-22 DIAGNOSIS — C80.1 CARCINOMA OF UNKNOWN PRIMARY (HCC): ICD-10-CM

## 2022-06-22 PROCEDURE — 2580000003 HC RX 258: Performed by: INTERNAL MEDICINE

## 2022-06-22 PROCEDURE — 74177 CT ABD & PELVIS W/CONTRAST: CPT

## 2022-06-22 PROCEDURE — 6360000004 HC RX CONTRAST MEDICATION: Performed by: INTERNAL MEDICINE

## 2022-06-22 RX ORDER — SODIUM CHLORIDE 0.9 % (FLUSH) 0.9 %
5-40 SYRINGE (ML) INJECTION PRN
Status: ACTIVE | OUTPATIENT
Start: 2022-06-22 | End: 2022-06-23

## 2022-06-22 RX ADMIN — SODIUM CHLORIDE, PRESERVATIVE FREE 10 ML: 5 INJECTION INTRAVENOUS at 12:15

## 2022-06-22 RX ADMIN — IOPAMIDOL 100 ML: 755 INJECTION, SOLUTION INTRAVENOUS at 12:15

## 2022-06-22 RX ADMIN — DIATRIZOATE MEGLUMINE AND DIATRIZOATE SODIUM 15 ML: 660; 100 LIQUID ORAL; RECTAL at 12:15

## 2022-06-22 NOTE — PROGRESS NOTES
[address] [facility] On[facility]  [address] [facility], [address] [place] [address]  [phone]        Ms. Radha Montalvo is a 52 y.o. female with a diagnosis with a complicated oncology history. She has been followed closely by Dr. Yoanna Ceja. He has performed diagnostic laparoscopy on her and diagnosed noticed her with peritoneal carcinomatosis. It is a poorly differentiated metastatic carcinoma. Patient is currently undergoing systemic treatment with Dr. Juno Bowen. INTERVAL HISTORY: She is here today for follow-up. She has bilateral ureteral stents in place. They were placed on 3/15/2022. She has no complaints today. She denies any UTI-like symptoms. She has not had any trouble with the stents. Her creatinine was checked on 6/23/2022 and was stable at 0.8. She also underwent a CT scan of the abdomen pelvis on 6/22/2022 which showed improved hydronephrosis with the bilateral stents in place. From previous note:  She is seeing me today because of bilateral hydronephrosis. At the time of the exploratory laparotomy Dr. Cristian Greenwood placed stents. A recent CT scan from 3/2/2022 shows delayed nephrogram on the left with left greater than right hydronephrosis. The left stent has fallen down to the mid ureter.     She denies any flank pain. Her creatinine on 3/2/2022 was 0.94. Its been as high as 1.7 in February 2022.     She has no significant lower urinary tract symptoms. Her main complaint has been recent difficulty swallowing but she underwent esophageal dilation recently and that has improved. Past medical, family and social histories, as well as medications and allergies, were reviewed and updated in the medical record as appropriate.     PMH:     Past Medical History:   Diagnosis Date    Anemia     2012-- not a problem since hyst    Colon cancer ([facility] [address] dx 2/2022     plans for chemo--- followed by dr Kimmie EL-19 12/2020    no hospitalization    GERD (gastroesophageal reflux disease)     managed with med    History of colonoscopy 05/2018    Dr. Deepa Yates, nl (see media note), R [id number]    Hypotension     asymptomatic    Obstruction of fallopian tube     per pt has \"1 good tube\"    Weight loss     80lbs weight loss after gastric sleeve       MEDs:     acetaminophen  diatrizoate meglumine-sodium  ergocalciferol  lidocaine-prilocaine  ondansetron  oxybutynin  pantoprazole  potassium chloride  sodium chloride flush  valACYclovir     ALLERGIES:    No Known Allergies    ROS:     Review of Systems   Constitutional: Negative. Negative for chills, fatigue and fever. Respiratory: Negative. Cardiovascular: Negative. Gastrointestinal: Negative. Genitourinary: Negative. Negative for difficulty urinating, dysuria, flank pain, frequency, hematuria and urgency. Musculoskeletal: Negative. All other systems reviewed and are negative. PHYSICAL EXAMINATION    There were no vitals taken for this visit. General: well dressed, well nourished, no acute distress  Skin: no rashes  HEENT: Sclera are clear,normocephalic, atraumatic. no external lesions   Cardiovascular: Reg. Normal perfusion  Respiratory: normal respiratory effort, no JVD, no audible wheezing. Musculoskeletal: unremarkable with normal function. No embolic signs or cyanosis. Neurologic exam: intact, no focal deficits, moves all 4 extremities  Psych: normal mood and affect, alert, oriented x 3  LE:  no edema  GI: soft, nontender, no masses, no CVA tenderness  : DEFERRED       LABORATORY RESULTS:    Creat 0.8    IMAGING:    XR Results:    No results found for this or any previous visit. CT Results:    === 06/22/22 ===    CT CHEST ABDOMEN PELVIS W CONTRAST    - Narrative -  EXAMINATION: CT CHEST ABDOMEN PELVIS W CONTRAST 6/22/2022 12:15 PM    ACCESSION NUMBER: [id number]    COMPARISON: CT abdomen pelvis March 2, 2022.  PET/CT February 8, 2022    INDICATION: History of poorly differentiated ovarian adenocarcinoma. Restaging    TECHNIQUE: Multiple contiguous axial CT images of the chest, abdomen, and pelvis  were obtained from the lung apices to the symphysis pubis after the intravenous  administration of 100 mL Isovue 370. Reformats are provided. Radiation dose reduction techniques were used for this study. Our CT scanners  use one or all of the following: Automated exposure control, adjustment of the  mA and/or kV according to patient size, iterative reconstruction. FINDINGS:  CHEST:  Base of Neck/Chest Wall: Unremarkable. Mediastinum: No enlarged mediastinal or hilar adenopathy. The heart is normal in  size. No pericardial effusion. Right chest Chemo-Port with distal tip  terminating within the right atrium. The thoracic aorta and main pulmonary  artery are normal caliber. Although study was not timed for the opacification of  the pulmonary arteries there are nonocclusive filling defects of the opacified  left lower lobe segmental vessels (axial image number 31 and coronal image  number 52). Airways/Lungs/Pleura: The large central airways are clear. Lung volumes are  normal without focal consolidation, suspicious pulmonary nodule, or groundglass  attenuation. No pleural fluid collections or pneumothorax. ABDOMEN AND PELVIS:  Liver: Diffuse decreased attenuation of the liver parenchyma compatible the  spleen most suggestive of hepatic steatosis. Not discrete hepatic lesion. The  portal hepatic veins are patent. Gallbladder/Biliary: Unremarkable. No biliary ductal dilatation. Pancreas: Unremarkable. No main ductal dilatation. Spleen: Similar subcentimeter hypodensities within the posterior medial and  anterior hilar aspect of the spleen statistically favoring benign process. Small  infrarenal round splenule. Adrenal glands: Unremarkable.     Kidneys/Ureters: Bilateral urothelial stents coiled within the renal pelvis  disease and bladder with improved hydroureteronephrosis, and improved  enhancement of the left renal parenchyma. Bilateral mild pelviectasis present. No suspicious lesion. Bladder: Diffuse thickening of the partially distended bladder. Reproduction:Postsurgical changes status post hysterectomy with bilateral  salpingo-oophorectomies. Bowel: Postsurgical changes related to prior gastric sleeve. No obstructing mass  or bowel wall thickening. Mesentery/Retroperitoneum/Peritoneum: Increase indeterminate attenuating fluid  measuring up to 24 Hounsfield units centered within the dependent pelvis and  space of Retzius. Additional areas of scattered Mesentery/peroneal stranding and  nodularity that is annotated with arrows on axial series 2. Persistent yet  diminished soft tissue thickening overlying the anterior lower abdominal wall  and adjacent soft tissues. Increase conspicuously of a 7 mm right pelvic  sidewall lymph node (axial image number 98)    Vasculature: Abdominal aorta is normal caliber. Musculoskeletal: No acute or aggressive osseous abnormalities. - Impression -  1. Nonobstructive pulmonary filling defect involving the subsegmental vessels  of the left lower lobe. 2.  Postsurgical changes from post hysterectomy and bilateral  salpingo-oophorectomies with increased indeterminant attenuating fluid within  the dependent pelvis and Space of Retzius with additional mesentery/peritoneal  nodularity and stranding concerning for carcinomatosis. 3.  Increase conspicuously of a 7 mm right pelvic sidewall lymph node. 4.  Bilateral ureteral stents in place with improved hydroureteronephrosis and  enhancement of the left renal parenchyma. 5.  Persistent diffuse thickening of partially distended bladder which may  represent post treatment changes. 6.  No evidence of metastatic disease to the chest.    [facility] Critical Result: Appropriate actions should be taken.     Findings were submitted as critical result to ordering provider at 1:07 PM on  June 22, 2022 by radiologist Dr. Zelda Karimi MD.      MRI Results:    No results found for this or any previous visit. ASSESSMENT:    Ms. Giovanni Hutton is a 52 y.o. female with a diagnosis of with a complicated oncology history. She has been followed closely by Dr. Marla Spear. He has performed diagnostic laparoscopy on her and diagnosed noticed her with peritoneal carcinomatosis. It is a poorly differentiated metastatic carcinoma. She has had her stent since since mid March and has done well with them. Her hydronephrosis has improved and her creatinine is remained stable. We discussed the option of taking them out versus exchanging them. She and I both agree that we should likely exchange them with some stents that may last a little bit longer and allow her to get through her systemic treatments. We will plan to go to the operating room next week for cystoscopy bilateral retrograde pyelograms bilateral stent exchange. I requested that we order 6 [place] by 24 cm double-J Tria stents. We discussed the risks of stent exchange as outlined below. We discussed in detail the risks and benefits of cystoscopy and stent placement/exchange. We discussed the chance of bladder perforation or ureteral damage and need for open repair or percutaneous nephrostomy tube placement. There is a small risk of damage to the urethra, ureteral orifices, or ureter which may result in stricture. We discussed risk of urinary infection/sepsis. We discussed the risk of general anesthesia and other operative risks including but not limited to; MI, stroke, DVT/PE, bleeding, infection, pain/LUTS, other cardiovascular, pulmonary, neurologic, and renal complications. We explained that as with any surgical procedure there is a small chance of death. A hyman catheter may be left in place after the procedure. All of the patient's questions were answered and they wish to proceed.   We carefully explained the stent will have to be removed or replaced in the next 3-4 months and cannot remain in the patient or it will cause significant complications. Patient acknowledged the need for urologic follow-up for stent removal or continued management. PLAN:   -discuss stent exchange/removal   -reviewed recent CT c/a/p w cont  -ua and ucx today  -To OR for bilateral stent exchange next tues/thurs wit TRIA stents (OR notified to order them asap)    ________________________________________      I have seen and examined this patient. I have reviewed and edited the note started by the MA and agree with the outlined plan. Part of this note was written by using a voice dictation software. The note has been proof read but may still contain some grammatical/other typographical errors.       Meli Lopez [facility] Urology

## 2022-06-23 ENCOUNTER — OFFICE VISIT (OUTPATIENT)
Dept: ONCOLOGY | Age: 47
End: 2022-06-23
Payer: COMMERCIAL

## 2022-06-23 ENCOUNTER — OFFICE VISIT (OUTPATIENT)
Dept: ONCOLOGY | Age: 47
End: 2022-06-23

## 2022-06-23 ENCOUNTER — HOSPITAL ENCOUNTER (OUTPATIENT)
Dept: LAB | Age: 47
Discharge: HOME | End: 2022-06-26
Payer: COMMERCIAL

## 2022-06-23 ENCOUNTER — CLINICAL DOCUMENTATION (OUTPATIENT)
Dept: CASE MANAGEMENT | Age: 47
End: 2022-06-23

## 2022-06-23 VITALS
RESPIRATION RATE: 15 BRPM | HEART RATE: 81 BPM | OXYGEN SATURATION: 98 % | WEIGHT: 112.2 LBS | BODY MASS INDEX: 19.15 KG/M2 | TEMPERATURE: 98 F | DIASTOLIC BLOOD PRESSURE: 95 MMHG | HEIGHT: 64 IN | SYSTOLIC BLOOD PRESSURE: 143 MMHG

## 2022-06-23 DIAGNOSIS — Z79.899 HIGH RISK MEDICATION USE: ICD-10-CM

## 2022-06-23 DIAGNOSIS — C80.1 CARCINOMA OF UNKNOWN PRIMARY (HCC): Primary | ICD-10-CM

## 2022-06-23 DIAGNOSIS — Z00.8 NUTRITIONAL ASSESSMENT: Primary | ICD-10-CM

## 2022-06-23 LAB
ALBUMIN SERPL-MCNC: 4 G/DL (ref 3.5–5)
ALBUMIN/GLOB SERPL: 1 {RATIO} (ref 1.2–3.5)
ALP SERPL-CCNC: 75 U/L (ref 50–136)
ALT SERPL-CCNC: 32 U/L (ref 12–65)
ANION GAP SERPL CALC-SCNC: 6 MMOL/L (ref 7–16)
AST SERPL-CCNC: 26 U/L (ref 15–37)
BASOPHILS # BLD: 0 K/UL (ref 0–0.2)
BASOPHILS NFR BLD: 0 % (ref 0–2)
BILIRUB SERPL-MCNC: 0.3 MG/DL (ref 0.2–1.1)
BUN SERPL-MCNC: 10 MG/DL (ref 6–23)
CALCIUM SERPL-MCNC: 9.4 MG/DL (ref 8.3–10.4)
CHLORIDE SERPL-SCNC: 101 MMOL/L (ref 98–107)
CO2 SERPL-SCNC: 30 MMOL/L (ref 21–32)
CREAT SERPL-MCNC: 0.8 MG/DL (ref 0.6–1)
CREAT UR-MCNC: 107 MG/DL
DIFFERENTIAL METHOD BLD: ABNORMAL
EOSINOPHIL # BLD: 0.1 K/UL (ref 0–0.8)
EOSINOPHIL NFR BLD: 2 % (ref 0.5–7.8)
ERYTHROCYTE [DISTWIDTH] IN BLOOD BY AUTOMATED COUNT: 16.5 % (ref 11.9–14.6)
GLOBULIN SER CALC-MCNC: 4.1 G/DL (ref 2.3–3.5)
GLUCOSE SERPL-MCNC: 89 MG/DL (ref 65–100)
HCT VFR BLD AUTO: 42.2 % (ref 35.8–46.3)
HGB BLD-MCNC: 13.3 G/DL (ref 11.7–15.4)
IMM GRANULOCYTES # BLD AUTO: 0 K/UL (ref 0–0.5)
IMM GRANULOCYTES NFR BLD AUTO: 0 % (ref 0–5)
LYMPHOCYTES # BLD: 1.5 K/UL (ref 0.5–4.6)
LYMPHOCYTES NFR BLD: 26 % (ref 13–44)
MAGNESIUM SERPL-MCNC: 2 MG/DL (ref 1.8–2.4)
MCH RBC QN AUTO: 29.1 PG (ref 26.1–32.9)
MCHC RBC AUTO-ENTMCNC: 31.5 G/DL (ref 31.4–35)
MCV RBC AUTO: 92.3 FL (ref 79.6–97.8)
MONOCYTES # BLD: 0.9 K/UL (ref 0.1–1.3)
MONOCYTES NFR BLD: 16 % (ref 4–12)
NEUTS SEG # BLD: 3.3 K/UL (ref 1.7–8.2)
NEUTS SEG NFR BLD: 56 % (ref 43–78)
NRBC # BLD: 0 K/UL (ref 0–0.2)
PLATELET # BLD AUTO: 147 K/UL (ref 150–450)
PMV BLD AUTO: 10.1 FL (ref 9.4–12.3)
POTASSIUM SERPL-SCNC: 4.4 MMOL/L (ref 3.5–5.1)
PROT SERPL-MCNC: 8.1 G/DL (ref 6.3–8.2)
PROT UR-MCNC: 48 MG/DL
PROT/CREAT UR-RTO: 0.4
RBC # BLD AUTO: 4.57 M/UL (ref 4.05–5.2)
SODIUM SERPL-SCNC: 137 MMOL/L (ref 136–145)
WBC # BLD AUTO: 5.9 K/UL (ref 4.3–11.1)

## 2022-06-23 PROCEDURE — 85025 COMPLETE CBC W/AUTO DIFF WBC: CPT

## 2022-06-23 PROCEDURE — 83735 ASSAY OF MAGNESIUM: CPT

## 2022-06-23 PROCEDURE — 36415 COLL VENOUS BLD VENIPUNCTURE: CPT

## 2022-06-23 PROCEDURE — 84156 ASSAY OF PROTEIN URINE: CPT

## 2022-06-23 PROCEDURE — 99214 OFFICE O/P EST MOD 30 MIN: CPT | Performed by: INTERNAL MEDICINE

## 2022-06-23 PROCEDURE — 80053 COMPREHEN METABOLIC PANEL: CPT

## 2022-06-23 RX ORDER — FLUOROURACIL 50 MG/ML
400 INJECTION, SOLUTION INTRAVENOUS ONCE
Status: CANCELLED | OUTPATIENT
Start: 2022-06-24 | End: 2022-06-24

## 2022-06-23 RX ORDER — DIPHENHYDRAMINE HYDROCHLORIDE 50 MG/ML
50 INJECTION INTRAMUSCULAR; INTRAVENOUS
Status: CANCELLED | OUTPATIENT
Start: 2022-06-24

## 2022-06-23 RX ORDER — SODIUM CHLORIDE 0.9 % (FLUSH) 0.9 %
5-40 SYRINGE (ML) INJECTION PRN
Status: CANCELLED | OUTPATIENT
Start: 2022-06-24

## 2022-06-23 RX ORDER — MEPERIDINE HYDROCHLORIDE 50 MG/ML
12.5 INJECTION INTRAMUSCULAR; INTRAVENOUS; SUBCUTANEOUS PRN
Status: CANCELLED | OUTPATIENT
Start: 2022-06-24

## 2022-06-23 RX ORDER — SODIUM CHLORIDE 9 MG/ML
5-40 INJECTION INTRAVENOUS PRN
Status: CANCELLED | OUTPATIENT
Start: 2022-06-26

## 2022-06-23 RX ORDER — SODIUM CHLORIDE 0.9 % (FLUSH) 0.9 %
5-40 SYRINGE (ML) INJECTION PRN
Status: CANCELLED | OUTPATIENT
Start: 2022-06-26

## 2022-06-23 RX ORDER — ALBUTEROL SULFATE 90 UG/1
4 AEROSOL, METERED RESPIRATORY (INHALATION) PRN
Status: CANCELLED | OUTPATIENT
Start: 2022-06-24

## 2022-06-23 RX ORDER — HEPARIN SODIUM (PORCINE) LOCK FLUSH IV SOLN 100 UNIT/ML 100 UNIT/ML
500 SOLUTION INTRAVENOUS PRN
Status: CANCELLED | OUTPATIENT
Start: 2022-06-26

## 2022-06-23 RX ORDER — ACETAMINOPHEN 325 MG/1
650 TABLET ORAL
Status: CANCELLED | OUTPATIENT
Start: 2022-06-24

## 2022-06-23 RX ORDER — HEPARIN SODIUM (PORCINE) LOCK FLUSH IV SOLN 100 UNIT/ML 100 UNIT/ML
500 SOLUTION INTRAVENOUS PRN
Status: CANCELLED | OUTPATIENT
Start: 2022-06-24

## 2022-06-23 RX ORDER — SODIUM CHLORIDE 9 MG/ML
5-250 INJECTION, SOLUTION INTRAVENOUS PRN
Status: CANCELLED | OUTPATIENT
Start: 2022-06-26

## 2022-06-23 RX ORDER — ONDANSETRON 2 MG/ML
8 INJECTION INTRAMUSCULAR; INTRAVENOUS ONCE
Status: CANCELLED | OUTPATIENT
Start: 2022-06-24 | End: 2022-06-24

## 2022-06-23 RX ORDER — DEXTROSE MONOHYDRATE 50 MG/ML
5-250 INJECTION, SOLUTION INTRAVENOUS PRN
Status: CANCELLED | OUTPATIENT
Start: 2022-06-24

## 2022-06-23 RX ORDER — SODIUM CHLORIDE 9 MG/ML
INJECTION, SOLUTION INTRAVENOUS CONTINUOUS
Status: CANCELLED | OUTPATIENT
Start: 2022-06-24

## 2022-06-23 RX ORDER — ONDANSETRON 2 MG/ML
8 INJECTION INTRAMUSCULAR; INTRAVENOUS
Status: CANCELLED | OUTPATIENT
Start: 2022-06-24

## 2022-06-23 RX ORDER — SODIUM CHLORIDE 9 MG/ML
5-250 INJECTION, SOLUTION INTRAVENOUS PRN
Status: CANCELLED | OUTPATIENT
Start: 2022-06-24

## 2022-06-23 RX ORDER — SODIUM CHLORIDE 9 MG/ML
5-40 INJECTION INTRAVENOUS PRN
Status: CANCELLED | OUTPATIENT
Start: 2022-06-24

## 2022-06-23 RX ORDER — FAMOTIDINE 10 MG/ML
20 INJECTION, SOLUTION INTRAVENOUS
Status: CANCELLED | OUTPATIENT
Start: 2022-06-24

## 2022-06-23 RX ORDER — EPINEPHRINE 1 MG/ML
0.3 INJECTION, SOLUTION, CONCENTRATE INTRAVENOUS PRN
Status: CANCELLED | OUTPATIENT
Start: 2022-06-24

## 2022-06-23 ASSESSMENT — PATIENT HEALTH QUESTIONNAIRE - PHQ9
SUM OF ALL RESPONSES TO PHQ QUESTIONS 1-9: 0
1. LITTLE INTEREST OR PLEASURE IN DOING THINGS: 0
SUM OF ALL RESPONSES TO PHQ9 QUESTIONS 1 & 2: 0
SUM OF ALL RESPONSES TO PHQ QUESTIONS 1-9: 0
2. FEELING DOWN, DEPRESSED OR HOPELESS: 0

## 2022-06-23 NOTE — PATIENT INSTRUCTIONS
Patient Instructions from Today's Visit    Reason for Visit:  Pre chemo cycle 7 FOLFOX  CT reviewed with patient    Plan:  Referral to Dr. Lory Reagan for surgical discussion   Proceed with infusion tomorrow   Will send blood thinner to pharmacy     Follow Up:  2 weeks     Recent Lab Results:  Hospital Outpatient Visit on 06/23/2022   Component Date Value Ref Range Status    WBC 06/23/2022 5.9  4.3 - 11.1 K/uL Final    RBC 06/23/2022 4.57  4.05 - 5.2 M/uL Final    Hemoglobin 06/23/2022 13.3  11.7 - 15.4 g/dL Final    Hematocrit 06/23/2022 42.2  35.8 - 46.3 % Final    MCV 06/23/2022 92.3  79.6 - 97.8 FL Final    MCH 06/23/2022 29.1  26.1 - 32.9 PG Final    MCHC 06/23/2022 31.5  31.4 - 35.0 g/dL Final    RDW 06/23/2022 16.5* 11.9 - 14.6 % Final    Platelets 79/07/7638 147* 150 - 450 K/uL Final    MPV 06/23/2022 10.1  9.4 - 12.3 FL Final    nRBC 06/23/2022 0.00  0.0 - 0.2 K/uL Final    **Note: Absolute NRBC parameter is now reported with Hemogram**    Seg Neutrophils 06/23/2022 56  43 - 78 % Final    Lymphocytes 06/23/2022 26  13 - 44 % Final    Monocytes 06/23/2022 16* 4.0 - 12.0 % Final    Eosinophils % 06/23/2022 2  0.5 - 7.8 % Final    Basophils 06/23/2022 0  0.0 - 2.0 % Final    Immature Granulocytes 06/23/2022 0  0.0 - 5.0 % Final    Segs Absolute 06/23/2022 3.3  1.7 - 8.2 K/UL Final    Absolute Lymph # 06/23/2022 1.5  0.5 - 4.6 K/UL Final    Absolute Mono # 06/23/2022 0.9  0.1 - 1.3 K/UL Final    Absolute Eos # 06/23/2022 0.1  0.0 - 0.8 K/UL Final    Basophils Absolute 06/23/2022 0.0  0.0 - 0.2 K/UL Final    Absolute Immature Granulocyte 06/23/2022 0.0  0.0 - 0.5 K/UL Final    Differential Type 06/23/2022 AUTOMATED    Final    Sodium 06/23/2022 137  136 - 145 mmol/L Final    Potassium 06/23/2022 4.4  3.5 - 5.1 mmol/L Final    Chloride 06/23/2022 101  98 - 107 mmol/L Final    CO2 06/23/2022 30  21 - 32 mmol/L Final    Anion Gap 06/23/2022 6* 7 - 16 mmol/L Final    Glucose 06/23/2022 89  65 - 100 mg/dL Final    BUN 06/23/2022 10  6 - 23 MG/DL Final    CREATININE 06/23/2022 0.80  0.6 - 1.0 MG/DL Final    GFR  06/23/2022 >60  >60 ml/min/1.73m2 Final    GFR Non- 06/23/2022 >60  >60 ml/min/1.73m2 Final    Comment:      Estimated GFR is calculated using the Modification of Diet in Renal Disease (MDRD) Study equation, reported for both  Americans (GFRAA) and non- Americans (GFRNA), and normalized to 1.73m2 body surface area. The physician must decide which value applies to the patient. The MDRD study equation should only be used in individuals age 25 or older. It has not been validated for the following: pregnant women, patients with serious comorbid conditions,or on certain medications, or persons with extremes of body size, muscle mass, or nutritional status.       Calcium 06/23/2022 9.4  8.3 - 10.4 MG/DL Final    Total Bilirubin 06/23/2022 0.3  0.2 - 1.1 MG/DL Final    ALT 06/23/2022 32  12 - 65 U/L Final    AST 06/23/2022 26  15 - 37 U/L Final    Alk Phosphatase 06/23/2022 75  50 - 136 U/L Final    Total Protein 06/23/2022 8.1  6.3 - 8.2 g/dL Final    Albumin 06/23/2022 4.0  3.5 - 5.0 g/dL Final    Globulin 06/23/2022 4.1* 2.3 - 3.5 g/dL Final    Albumin/Globulin Ratio 06/23/2022 1.0* 1.2 - 3.5   Final    Magnesium 06/23/2022 2.0  1.8 - 2.4 mg/dL Final    Protein, Urine, Random 06/23/2022 48* <11.9 mg/dL Final    Creatinine, Ur 06/23/2022 107.00  mg/dL Final    PROTEIN/CREAT RATIO URINE RAN 06/23/2022 0.4    Final         Treatment Summary has been discussed and given to patient: no        -------------------------------------------------------------------------------------------------------------------  Please call our office at [phone] if you have any  of the following symptoms:   · Fever of 100.5 or greater  · Chills  · Shortness of breath  · Swelling or pain in one leg    After office hours an answering service is available and will contact a provider for emergencies or if you are experiencing any of the above symptoms.  Patient did express an interest in My Chart. My Chart log in information explained on the after visit summary printout at the [facility] [address] desk.     Madeleine Rodriguez RN, BSN  Nurse Navigator  [phone] cell  [email or web address]

## 2022-06-23 NOTE — PROGRESS NOTES
6/23/22 saw pt today with Dr. Brittney Corrigan for pre chemo cycle 7 FOLFOX. CT scan reviewed with pt in detail. Small PE noted, will start xarelto. Tolerating chemo well. PO intake good, maintaining weight. Infusion tomorrow. Referral to Dr. Michelle Victor for surgical evaluation. Follow up in 2 weeks. Encouraged to call with any concerns. Navigation will continue to follow.

## 2022-06-24 ENCOUNTER — HOSPITAL ENCOUNTER (OUTPATIENT)
Dept: LAB | Age: 47
Discharge: HOME | End: 2022-06-27
Payer: COMMERCIAL

## 2022-06-24 ENCOUNTER — OFFICE VISIT (OUTPATIENT)
Dept: ONCOLOGY | Age: 47
End: 2022-06-24
Payer: COMMERCIAL

## 2022-06-24 ENCOUNTER — HOSPITAL ENCOUNTER (OUTPATIENT)
Dept: INFUSION THERAPY | Age: 47
Discharge: HOME | End: 2022-06-24

## 2022-06-24 VITALS
DIASTOLIC BLOOD PRESSURE: 86 MMHG | WEIGHT: 112.4 LBS | RESPIRATION RATE: 16 BRPM | BODY MASS INDEX: 19.29 KG/M2 | TEMPERATURE: 98.2 F | HEART RATE: 94 BPM | SYSTOLIC BLOOD PRESSURE: 124 MMHG | OXYGEN SATURATION: 99 %

## 2022-06-24 DIAGNOSIS — C80.1 CARCINOMA OF UNKNOWN PRIMARY (HCC): ICD-10-CM

## 2022-06-24 DIAGNOSIS — C80.1 CARCINOMA OF UNKNOWN PRIMARY (HCC): Primary | ICD-10-CM

## 2022-06-24 LAB
APPEARANCE UR: ABNORMAL
BACTERIA URNS QL MICRO: ABNORMAL /HPF
BILIRUB UR QL: NEGATIVE
CASTS URNS QL MICRO: 0 /LPF
COLOR UR: YELLOW
CRYSTALS URNS QL MICRO: 0 /LPF
EPI CELLS #/AREA URNS HPF: ABNORMAL /HPF
GLUCOSE UR STRIP.AUTO-MCNC: NEGATIVE MG/DL
HGB UR QL STRIP: ABNORMAL
KETONES UR QL STRIP.AUTO: NEGATIVE MG/DL
LEUKOCYTE ESTERASE UR QL STRIP.AUTO: ABNORMAL
MUCOUS THREADS URNS QL MICRO: ABNORMAL /LPF
NITRITE UR QL STRIP.AUTO: NEGATIVE
PH UR STRIP: 5 [PH] (ref 5–9)
PROT UR STRIP-MCNC: 100 MG/DL
RBC #/AREA URNS HPF: >100 /HPF
SP GR UR REFRACTOMETRY: 1.02 (ref 1–1.02)
UROBILINOGEN UR QL STRIP.AUTO: 0.2 EU/DL (ref 0.2–1)
WBC URNS QL MICRO: ABNORMAL /HPF

## 2022-06-24 PROCEDURE — 81015 MICROSCOPIC EXAM OF URINE: CPT

## 2022-06-24 PROCEDURE — 96375 TX/PRO/DX INJ NEW DRUG ADDON: CPT

## 2022-06-24 PROCEDURE — 96411 CHEMO IV PUSH ADDL DRUG: CPT

## 2022-06-24 PROCEDURE — 96413 CHEMO IV INFUSION 1 HR: CPT

## 2022-06-24 PROCEDURE — 81003 URINALYSIS AUTO W/O SCOPE: CPT

## 2022-06-24 PROCEDURE — G0498 CHEMO EXTEND IV INFUS W/PUMP: HCPCS

## 2022-06-24 PROCEDURE — 2580000003 HC RX 258: Performed by: INTERNAL MEDICINE

## 2022-06-24 PROCEDURE — 96415 CHEMO IV INFUSION ADDL HR: CPT

## 2022-06-24 PROCEDURE — 6360000002 HC RX W HCPCS: Performed by: INTERNAL MEDICINE

## 2022-06-24 PROCEDURE — 87086 URINE CULTURE/COLONY COUNT: CPT

## 2022-06-24 PROCEDURE — 99213 OFFICE O/P EST LOW 20 MIN: CPT | Performed by: UROLOGY

## 2022-06-24 PROCEDURE — 96368 THER/DIAG CONCURRENT INF: CPT

## 2022-06-24 RX ORDER — FLUOROURACIL 50 MG/ML
400 INJECTION, SOLUTION INTRAVENOUS ONCE
Status: COMPLETED | OUTPATIENT
Start: 2022-06-24 | End: 2022-06-24

## 2022-06-24 RX ORDER — DIPHENHYDRAMINE HYDROCHLORIDE 50 MG/ML
50 INJECTION INTRAMUSCULAR; INTRAVENOUS
Status: DISCONTINUED | OUTPATIENT
Start: 2022-06-24 | End: 2022-06-24 | Stop reason: HOSPADM

## 2022-06-24 RX ORDER — HEPARIN SODIUM (PORCINE) LOCK FLUSH IV SOLN 100 UNIT/ML 100 UNIT/ML
500 SOLUTION INTRAVENOUS PRN
Status: DISCONTINUED | OUTPATIENT
Start: 2022-06-24 | End: 2022-06-25 | Stop reason: HOSPADM

## 2022-06-24 RX ORDER — ALBUTEROL SULFATE 90 UG/1
4 AEROSOL, METERED RESPIRATORY (INHALATION) PRN
Status: DISCONTINUED | OUTPATIENT
Start: 2022-06-24 | End: 2022-06-25 | Stop reason: HOSPADM

## 2022-06-24 RX ORDER — SODIUM CHLORIDE 0.9 % (FLUSH) 0.9 %
5-40 SYRINGE (ML) INJECTION PRN
Status: DISCONTINUED | OUTPATIENT
Start: 2022-06-24 | End: 2022-06-25 | Stop reason: HOSPADM

## 2022-06-24 RX ORDER — SODIUM CHLORIDE 9 MG/ML
5-40 INJECTION INTRAVENOUS PRN
Status: DISCONTINUED | OUTPATIENT
Start: 2022-06-24 | End: 2022-06-25 | Stop reason: HOSPADM

## 2022-06-24 RX ORDER — SODIUM CHLORIDE 9 MG/ML
INJECTION, SOLUTION INTRAVENOUS CONTINUOUS
Status: DISCONTINUED | OUTPATIENT
Start: 2022-06-24 | End: 2022-06-25 | Stop reason: HOSPADM

## 2022-06-24 RX ORDER — DEXTROSE MONOHYDRATE 50 MG/ML
5-250 INJECTION, SOLUTION INTRAVENOUS PRN
Status: DISCONTINUED | OUTPATIENT
Start: 2022-06-24 | End: 2022-06-25 | Stop reason: HOSPADM

## 2022-06-24 RX ORDER — ONDANSETRON 2 MG/ML
8 INJECTION INTRAMUSCULAR; INTRAVENOUS
Status: DISCONTINUED | OUTPATIENT
Start: 2022-06-24 | End: 2022-06-24 | Stop reason: HOSPADM

## 2022-06-24 RX ORDER — EPINEPHRINE 1 MG/ML
0.3 INJECTION, SOLUTION, CONCENTRATE INTRAVENOUS PRN
Status: DISCONTINUED | OUTPATIENT
Start: 2022-06-24 | End: 2022-06-25 | Stop reason: HOSPADM

## 2022-06-24 RX ORDER — SODIUM CHLORIDE 9 MG/ML
5-250 INJECTION, SOLUTION INTRAVENOUS PRN
Status: DISCONTINUED | OUTPATIENT
Start: 2022-06-24 | End: 2022-06-25 | Stop reason: HOSPADM

## 2022-06-24 RX ORDER — ONDANSETRON 2 MG/ML
8 INJECTION INTRAMUSCULAR; INTRAVENOUS ONCE
Status: COMPLETED | OUTPATIENT
Start: 2022-06-24 | End: 2022-06-24

## 2022-06-24 RX ORDER — ACETAMINOPHEN 325 MG/1
650 TABLET ORAL
Status: DISCONTINUED | OUTPATIENT
Start: 2022-06-24 | End: 2022-06-24 | Stop reason: HOSPADM

## 2022-06-24 RX ORDER — MEPERIDINE HYDROCHLORIDE 25 MG/ML
12.5 INJECTION INTRAMUSCULAR; INTRAVENOUS; SUBCUTANEOUS PRN
Status: DISCONTINUED | OUTPATIENT
Start: 2022-06-24 | End: 2022-06-25 | Stop reason: HOSPADM

## 2022-06-24 RX ADMIN — LEUCOVORIN CALCIUM 650 MG: 350 INJECTION, POWDER, LYOPHILIZED, FOR SOLUTION INTRAMUSCULAR; INTRAVENOUS at 10:21

## 2022-06-24 RX ADMIN — OXALIPLATIN 135 MG: 5 INJECTION, SOLUTION INTRAVENOUS at 10:21

## 2022-06-24 RX ADMIN — DEXAMETHASONE SODIUM PHOSPHATE 12 MG: 4 INJECTION, SOLUTION INTRAMUSCULAR; INTRAVENOUS at 09:50

## 2022-06-24 RX ADMIN — FLUOROURACIL 3825 MG: 50 INJECTION, SOLUTION INTRAVENOUS at 12:30

## 2022-06-24 RX ADMIN — FLUOROURACIL 625 MG: 50 INJECTION, SOLUTION INTRAVENOUS at 12:25

## 2022-06-24 RX ADMIN — DEXTROSE MONOHYDRATE 100 ML/HR: 50 INJECTION, SOLUTION INTRAVENOUS at 09:45

## 2022-06-24 RX ADMIN — ONDANSETRON 8 MG: 2 INJECTION INTRAMUSCULAR; INTRAVENOUS at 09:48

## 2022-06-24 ASSESSMENT — ENCOUNTER SYMPTOMS
GASTROINTESTINAL NEGATIVE: 1
RESPIRATORY NEGATIVE: 1

## 2022-06-24 NOTE — PROGRESS NOTES
HEMATOLOGY/ONCOLOGY FOLLOWUP  VISIT      Patient Name: Radha Montalvo             Date of Visit: 2022  : 1975  Age:47 y.o. Presenting Complaint:  Radha Montalvo  is seen in follow-up for carcinoma of unknown primary. History of Present Illness:  Ms. Luis Miguel Polanco was seen for the first time in our office in 2022. She was a woman of previously good health who began noticing left-sided back discomfort in early 2022. This was associated  ultimately with a sense of difficulty swallowing and ultimately she presented to [facility] for evaluation. Her symptoms were felt to potentially be indicative of a bowel obstruction and she was sent for a CT scan. This study, performed on 2022  was notable for a linear calcific density along the course of the proximal left ureter with associated moderate hydronephrosis potentially indicative of an intraureteral calculus. There was also stranding throughout the retroperitoneal soft tissues anterior  to the left psoas muscle with pelvic ascites. There was also wall thickening involving the descending colon. The question of colitis or serosal implants was raised. The patient had undergone a gastric sleeve placement several years prior. She had  also undergone a negative colonoscopy about 3 years prior to these presentations. There was a history of anemia ultimately resulting in the performance of a hysterectomy approximately 3 years ago for menorrhagia. Because of the CT scan findings, she  was ultimately referred to Dr. Yoanna Ceja for evaluation of a possible pelvic malignancy. On 2022 he performed a laparoscopy and biopsy with evidence of peritoneal carcinomatosis grossly. A \"peritoneal nodule\" and a \"peritoneal implant\"  were both positive for a poorly differentiated metastatic carcinoma potentially consistent with an upper gastrointestinal primary.   An omental biopsy showed no evidence of malignancy. Immunohistochemistries were positive for cytokeratin 7 and negative  for cytokeratin 20. The tumor was weakly positive for CDX2. The only other positive study was MOC-31. A PET scan was subsequently performed on  showing elevated FDG uptake in the left pelvis corresponding with a masslike density concerning  for peritoneal carcinomatosis. There was a small volume of free fluid within the peritoneal cavity. There was moderate right hydroureteronephrosis. Despite the pathologic findings, there was no evidence of FDG activity in the upper gastrointestinal  tract. CA-125 was slightly elevated at 44. Given the uncertainty of her diagnosis, the patient went back for additional surgery on February 15 at which time she underwent bilateral ureteral stent placement and bilateral salpingo-oophorectomies. Pathology  from that surgery was notable for poorly differentiated carcinoma with occasional signet ring features. Immunohistochemical stains were most consistent with a tumor of the upper gastrointestinal tract. She is  1 para 1 abortus 0. There is no  family history of cancer. She has had no difficulties with vomiting. She still notes that some food traverses her esophagus slowly. She had undergone a prior dilatation without any evidence of cancer. She subsequently began treatment with FOLFOX ultimately  with the addition of bevacizumab in 2022. She returns for follow up. She remains on FOLFOX and Avastin. Since last seen, she has done extremely well. She has virtually no neuropathy associated with the treatment. There is no nausea or vomiting. Her performance status if anything is better now than it was prior to the initiation of treatment. She has no pain of significance. She has no headache and no abdominal discomfort. She has no distention. Her appetite is good and she is maintaining her weight. She denies pain.       Medications:   Current Outpatient Medications   Medication Sig Dispense Refill    potassium chloride (KLOR-CON M) 20 MEQ extended release tablet Take 1 tablet by mouth 2 times daily 60 tablet 1    ergocalciferol (ERGOCALCIFEROL) 1.25 MG (12452 UT) capsule Take 50,000 Units by mouth every 7 days      lidocaine-prilocaine (EMLA) 2.5-2.5 % cream Apply to port about 45 minutes prior to access      metoclopramide (REGLAN) 10 MG tablet Take 10 mg by mouth 4 times daily (before meals and nightly)      ondansetron (ZOFRAN) 8 MG tablet Take 8 mg by mouth every 8 hours as needed       pantoprazole (PROTONIX) 40 MG tablet 2 times daily      valACYclovir (VALTREX) 500 MG tablet Take 500 mg by mouth daily      rivaroxaban 15 & 20 MG Starter Pack Take as directed on package. 1 each 0    acetaminophen (TYLENOL) 500 MG tablet Take by mouth every 6 hours as needed (Patient not taking: Reported on 6/23/2022)      oxybutynin (DITROPAN) 5 MG tablet Take 5 mg by mouth 3 times daily as needed (Patient not taking: Reported on 5/26/2022)       No current facility-administered medications for this visit. Facility-Administered Medications Ordered in Other Visits   Medication Dose Route Frequency Provider Last Rate Last Admin    diatrizoate meglumine-sodium (GASTROGRAFIN) 66-10 % solution 15 mL  15 mL Oral ONCE PRN Alex Dickinson MD   15 mL at 06/22/22 1215       Allergies:  No Known Allergies    Review of Systems: The Review of Systems is documented in full in the internal medical record. All systems are negative other than for those noted above. Past Medical History:  Documented in electronic medical record    Past Surgical History:  Documented in electronic medical record    Social History:  Documented in electronic medical record    Family History:  Documented in electronic medical record      Physical Examination:  General Appearance: Healthy appearing patient in no acute distress.    Vital signs: BP (!) 143/95 (Site: Right Upper Arm, Position: Standing, Cuff Size: Medium Adult)   Pulse 81   Temp 98 °F (36.7 °C) (Oral)   Resp 15   Ht 5' 4\" (1.626 m)   Wt 112 lb 3.2 oz (50.9 kg)   SpO2 98%   Breastfeeding No   BMI 19.26 kg/m²     Performance status: ECOG Level: 0  Distress  Screening Score: PHQ-9 Total Score: 0 (6/23/2022  2:52 PM)  Pain Score:   0 - No pain (fatigue-0)    HEENT: No oral exam.  Neck: Supple. There is no thyromegaly. Lymph nodes: There is no cervical, supraclavicular, axillary or inguinal adenopathy. Breasts: Not examined. Lungs: The lungs are clear to auscultation and percussion. There is no egophony. There is no chest wall tenderness and no  use of accessory respiratory musculature. Heart: There is no jugular venous distention. The rate is normal and rhythm regular. The S1 and S2 are normal and there are no murmurs or rubs. Abdomen: Soft, non-tender, bowel sounds present and normal, no appreciated hepatosplenomegaly. No palpable masses. Skin: No rash, petechiae or ecchymoses. No evidence of malignancy. Extremities: No cyanosis, clubbing or edema. Labs/Imaging:  Lab Results   Component Value Date    WBC 5.9 06/23/2022    HGB 13.3 06/23/2022    HCT 42.2 06/23/2022     06/23/2022    MCV 92.3 06/23/2022       Lab Results   Component Value Date     06/23/2022    K 4.4 06/23/2022     06/23/2022    CO2 30 06/23/2022    BUN 10 06/23/2022    GFRAA >60 06/23/2022    GLOB 4.1 06/23/2022    ALT 32 06/23/2022       Above results reviewed with patient. ASSESSMENT:   Ms. Marcelo Liu has an apparent metastatic carcinoma of unknown primary. Pathologically, the tumor seems to resemble a process arising in the upper gastrointestinal tract. However, any such primary is  not visible on EGD or PET scanning. Dr. Savannah Trevino presumption is that this may have originated from the colon based on its location and behavior. That too would be somewhat inconsistent with the pathology findings.   There is no

## 2022-06-24 NOTE — PATIENT INSTRUCTIONS
Patient Instructions from Today's Visit    Reason for Visit:  Follow up     Diagnosis Information:  [email or web address]      Plan:  The swelling on your kidneys have improved since your last scan! Follow Up: We will plan to exchange the current stents you have in place. Once you finish treatment we can plan to completely remove the stents. Recent Lab Results:  n/a    Treatment Summary has been discussed and given to patient: n/a        -------------------------------------------------------------------------------------------------------------------  Please call our office at [phone] if you have any  of the following symptoms:   · Fever of 100.5 or greater  · Chills  · Shortness of breath  · Swelling or pain in one leg    After office hours an answering service is available and will contact a provider for emergencies or if you are experiencing any of the above symptoms.  Patient does express an interest in My Chart. My Chart log in information explained on the after visit summary printout at the [facility] desk.     DENIS BaxterMA

## 2022-06-24 NOTE — PROGRESS NOTES
Arrived to the [facility]. FOLFOX completed. Patient tolerated well. Any issues or concerns during appointment: No  Avastin on hold until pt sees Dr. Darel Peabody regarding surgery. Patient aware of next infusion appointment on 6/24/22 (date) at [phone] (time). Discharged home with 5FU pump in stable condition.

## 2022-06-26 ENCOUNTER — HOSPITAL ENCOUNTER (OUTPATIENT)
Dept: INFUSION THERAPY | Age: 47
Discharge: HOME | End: 2022-06-26
Payer: COMMERCIAL

## 2022-06-26 VITALS
RESPIRATION RATE: 18 BRPM | DIASTOLIC BLOOD PRESSURE: 92 MMHG | HEART RATE: 77 BPM | TEMPERATURE: 98.1 F | SYSTOLIC BLOOD PRESSURE: 143 MMHG

## 2022-06-26 DIAGNOSIS — C80.1 CARCINOMA OF UNKNOWN PRIMARY (HCC): Primary | ICD-10-CM

## 2022-06-26 LAB
BACTERIA SPEC CULT: NORMAL
SERVICE CMNT-IMP: NORMAL

## 2022-06-26 PROCEDURE — 2580000003 HC RX 258: Performed by: INTERNAL MEDICINE

## 2022-06-26 PROCEDURE — 96523 IRRIG DRUG DELIVERY DEVICE: CPT

## 2022-06-26 RX ORDER — SODIUM CHLORIDE 9 MG/ML
5-40 INJECTION INTRAVENOUS PRN
Status: DISCONTINUED | OUTPATIENT
Start: 2022-06-26 | End: 2022-06-27 | Stop reason: HOSPADM

## 2022-06-26 RX ADMIN — SODIUM CHLORIDE, PRESERVATIVE FREE 10 ML: 5 INJECTION INTRAVENOUS at 12:01

## 2022-06-26 NOTE — PROGRESS NOTES
Arrived to the [facility]. Pump d/c completed. Provided education on pump d/c    Patient instructed to report any side affects to ordering provider. Patient tolerated well. Any issues or concerns during appointment: none. Patient aware of next infusion appointment on 7/8 (date) at 0900 (time). Discharged ambulatory, no distress noted. Patient instructed to call provider with temperature of 100.4 or greater or nausea/vomiting/ diarrhea or pain not controlled by medications  .

## 2022-06-27 ENCOUNTER — ANESTHESIA EVENT (OUTPATIENT)
Dept: SURGERY | Age: 47
End: 2022-06-27
Payer: COMMERCIAL

## 2022-06-27 RX ORDER — CIPROFLOXACIN 2 MG/ML
400 INJECTION, SOLUTION INTRAVENOUS
Status: CANCELLED | OUTPATIENT
Start: 2022-06-27 | End: 2022-06-27

## 2022-06-28 ENCOUNTER — ANESTHESIA (OUTPATIENT)
Dept: SURGERY | Age: 47
End: 2022-06-28
Payer: COMMERCIAL

## 2022-06-28 ENCOUNTER — HOSPITAL ENCOUNTER (OUTPATIENT)
Age: 47
Setting detail: OUTPATIENT SURGERY
Discharge: HOME | End: 2022-06-28
Attending: UROLOGY | Admitting: UROLOGY
Payer: COMMERCIAL

## 2022-06-28 VITALS
WEIGHT: 112 LBS | RESPIRATION RATE: 16 BRPM | DIASTOLIC BLOOD PRESSURE: 82 MMHG | SYSTOLIC BLOOD PRESSURE: 141 MMHG | BODY MASS INDEX: 19.12 KG/M2 | HEIGHT: 64 IN | TEMPERATURE: 97.9 F | HEART RATE: 53 BPM | OXYGEN SATURATION: 100 %

## 2022-06-28 PROCEDURE — 3600000014 HC SURGERY LEVEL 4 ADDTL 15MIN: Performed by: UROLOGY

## 2022-06-28 PROCEDURE — 2709999900 HC NON-CHARGEABLE SUPPLY: Performed by: UROLOGY

## 2022-06-28 PROCEDURE — 6370000000 HC RX 637 (ALT 250 FOR IP): Performed by: ANESTHESIOLOGY

## 2022-06-28 PROCEDURE — 7100000011 HC PHASE II RECOVERY - ADDTL 15 MIN: Performed by: UROLOGY

## 2022-06-28 PROCEDURE — 2500000003 HC RX 250 WO HCPCS: Performed by: NURSE ANESTHETIST, CERTIFIED REGISTERED

## 2022-06-28 PROCEDURE — 2580000003 HC RX 258: Performed by: ANESTHESIOLOGY

## 2022-06-28 PROCEDURE — C1769 GUIDE WIRE: HCPCS | Performed by: UROLOGY

## 2022-06-28 PROCEDURE — 3700000001 HC ADD 15 MINUTES (ANESTHESIA): Performed by: UROLOGY

## 2022-06-28 PROCEDURE — 6360000002 HC RX W HCPCS: Performed by: UROLOGY

## 2022-06-28 PROCEDURE — C1758 CATHETER, URETERAL: HCPCS | Performed by: UROLOGY

## 2022-06-28 PROCEDURE — 3600000004 HC SURGERY LEVEL 4 BASE: Performed by: UROLOGY

## 2022-06-28 PROCEDURE — 7100000010 HC PHASE II RECOVERY - FIRST 15 MIN: Performed by: UROLOGY

## 2022-06-28 PROCEDURE — C2617 STENT, NON-COR, TEM W/O DEL: HCPCS | Performed by: UROLOGY

## 2022-06-28 PROCEDURE — 6360000002 HC RX W HCPCS: Performed by: NURSE ANESTHETIST, CERTIFIED REGISTERED

## 2022-06-28 PROCEDURE — 3700000000 HC ANESTHESIA ATTENDED CARE: Performed by: UROLOGY

## 2022-06-28 PROCEDURE — 52332 CYSTOSCOPY AND TREATMENT: CPT | Performed by: UROLOGY

## 2022-06-28 PROCEDURE — 74420 UROGRAPHY RTRGR +-KUB: CPT | Performed by: UROLOGY

## 2022-06-28 PROCEDURE — 7100000001 HC PACU RECOVERY - ADDTL 15 MIN: Performed by: UROLOGY

## 2022-06-28 PROCEDURE — 7100000000 HC PACU RECOVERY - FIRST 15 MIN: Performed by: UROLOGY

## 2022-06-28 PROCEDURE — 6360000002 HC RX W HCPCS: Performed by: ANESTHESIOLOGY

## 2022-06-28 DEVICE — URETERAL STENT WITH SIDE HOLES 6FX22CM
Type: IMPLANTABLE DEVICE | Site: URETER | Status: FUNCTIONAL
Brand: TRIA™ FIRM

## 2022-06-28 RX ORDER — HYDROMORPHONE HYDROCHLORIDE 2 MG/ML
0.5 INJECTION, SOLUTION INTRAMUSCULAR; INTRAVENOUS; SUBCUTANEOUS EVERY 10 MIN PRN
Status: DISCONTINUED | OUTPATIENT
Start: 2022-06-28 | End: 2022-06-28 | Stop reason: HOSPADM

## 2022-06-28 RX ORDER — FENTANYL CITRATE 50 UG/ML
25 INJECTION, SOLUTION INTRAMUSCULAR; INTRAVENOUS EVERY 5 MIN PRN
Status: DISCONTINUED | OUTPATIENT
Start: 2022-06-28 | End: 2022-06-28 | Stop reason: HOSPADM

## 2022-06-28 RX ORDER — FENTANYL CITRATE 50 UG/ML
100 INJECTION, SOLUTION INTRAMUSCULAR; INTRAVENOUS
Status: DISCONTINUED | OUTPATIENT
Start: 2022-06-28 | End: 2022-06-28 | Stop reason: HOSPADM

## 2022-06-28 RX ORDER — SODIUM CHLORIDE, SODIUM LACTATE, POTASSIUM CHLORIDE, CALCIUM CHLORIDE 600; 310; 30; 20 MG/100ML; MG/100ML; MG/100ML; MG/100ML
INJECTION, SOLUTION INTRAVENOUS CONTINUOUS
Status: DISCONTINUED | OUTPATIENT
Start: 2022-06-28 | End: 2022-06-28 | Stop reason: HOSPADM

## 2022-06-28 RX ORDER — OXYCODONE HYDROCHLORIDE 5 MG/1
5 TABLET ORAL PRN
Status: DISCONTINUED | OUTPATIENT
Start: 2022-06-28 | End: 2022-06-28 | Stop reason: HOSPADM

## 2022-06-28 RX ORDER — OXYBUTYNIN CHLORIDE 5 MG/1
5 TABLET ORAL 3 TIMES DAILY PRN
Qty: 30 TABLET | Refills: 2 | Status: SHIPPED | OUTPATIENT
Start: 2022-06-28 | End: 2022-07-07

## 2022-06-28 RX ORDER — OXYCODONE HYDROCHLORIDE 5 MG/1
10 TABLET ORAL PRN
Status: DISCONTINUED | OUTPATIENT
Start: 2022-06-28 | End: 2022-06-28 | Stop reason: HOSPADM

## 2022-06-28 RX ORDER — SODIUM CHLORIDE 0.9 % (FLUSH) 0.9 %
5-40 SYRINGE (ML) INJECTION EVERY 12 HOURS SCHEDULED
Status: DISCONTINUED | OUTPATIENT
Start: 2022-06-28 | End: 2022-06-28 | Stop reason: HOSPADM

## 2022-06-28 RX ORDER — MIDAZOLAM HYDROCHLORIDE 2 MG/2ML
2 INJECTION, SOLUTION INTRAMUSCULAR; INTRAVENOUS
Status: COMPLETED | OUTPATIENT
Start: 2022-06-28 | End: 2022-06-28

## 2022-06-28 RX ORDER — ONDANSETRON 2 MG/ML
4 INJECTION INTRAMUSCULAR; INTRAVENOUS
Status: DISCONTINUED | OUTPATIENT
Start: 2022-06-28 | End: 2022-06-28 | Stop reason: HOSPADM

## 2022-06-28 RX ORDER — ACETAMINOPHEN 500 MG
1000 TABLET ORAL ONCE
Status: COMPLETED | OUTPATIENT
Start: 2022-06-28 | End: 2022-06-28

## 2022-06-28 RX ORDER — CIPROFLOXACIN 2 MG/ML
400 INJECTION, SOLUTION INTRAVENOUS
Status: COMPLETED | OUTPATIENT
Start: 2022-06-28 | End: 2022-06-28

## 2022-06-28 RX ORDER — ONDANSETRON 2 MG/ML
INJECTION INTRAMUSCULAR; INTRAVENOUS PRN
Status: DISCONTINUED | OUTPATIENT
Start: 2022-06-28 | End: 2022-06-28 | Stop reason: SDUPTHER

## 2022-06-28 RX ORDER — SODIUM CHLORIDE 0.9 % (FLUSH) 0.9 %
5-40 SYRINGE (ML) INJECTION PRN
Status: DISCONTINUED | OUTPATIENT
Start: 2022-06-28 | End: 2022-06-28 | Stop reason: HOSPADM

## 2022-06-28 RX ORDER — DEXAMETHASONE SODIUM PHOSPHATE 4 MG/ML
INJECTION, SOLUTION INTRA-ARTICULAR; INTRALESIONAL; INTRAMUSCULAR; INTRAVENOUS; SOFT TISSUE PRN
Status: DISCONTINUED | OUTPATIENT
Start: 2022-06-28 | End: 2022-06-28 | Stop reason: SDUPTHER

## 2022-06-28 RX ORDER — METOCLOPRAMIDE HYDROCHLORIDE 5 MG/ML
10 INJECTION INTRAMUSCULAR; INTRAVENOUS
Status: DISCONTINUED | OUTPATIENT
Start: 2022-06-28 | End: 2022-06-28 | Stop reason: HOSPADM

## 2022-06-28 RX ORDER — LIDOCAINE HYDROCHLORIDE 20 MG/ML
INJECTION, SOLUTION EPIDURAL; INFILTRATION; INTRACAUDAL; PERINEURAL PRN
Status: DISCONTINUED | OUTPATIENT
Start: 2022-06-28 | End: 2022-06-28 | Stop reason: SDUPTHER

## 2022-06-28 RX ORDER — DIPHENHYDRAMINE HYDROCHLORIDE 50 MG/ML
12.5 INJECTION INTRAMUSCULAR; INTRAVENOUS
Status: DISCONTINUED | OUTPATIENT
Start: 2022-06-28 | End: 2022-06-28 | Stop reason: HOSPADM

## 2022-06-28 RX ORDER — PROPOFOL 10 MG/ML
INJECTION, EMULSION INTRAVENOUS PRN
Status: DISCONTINUED | OUTPATIENT
Start: 2022-06-28 | End: 2022-06-28 | Stop reason: SDUPTHER

## 2022-06-28 RX ORDER — SCOLOPAMINE TRANSDERMAL SYSTEM 1 MG/1
1 PATCH, EXTENDED RELEASE TRANSDERMAL
Status: DISCONTINUED | OUTPATIENT
Start: 2022-06-28 | End: 2022-06-28 | Stop reason: HOSPADM

## 2022-06-28 RX ADMIN — LIDOCAINE HYDROCHLORIDE 100 MG: 20 INJECTION, SOLUTION EPIDURAL; INFILTRATION; INTRACAUDAL; PERINEURAL at 11:26

## 2022-06-28 RX ADMIN — DEXAMETHASONE SODIUM PHOSPHATE 4 MG: 4 INJECTION, SOLUTION INTRAMUSCULAR; INTRAVENOUS at 11:31

## 2022-06-28 RX ADMIN — MIDAZOLAM HYDROCHLORIDE 2 MG: 1 INJECTION, SOLUTION INTRAMUSCULAR; INTRAVENOUS at 11:02

## 2022-06-28 RX ADMIN — SODIUM CHLORIDE, SODIUM LACTATE, POTASSIUM CHLORIDE, AND CALCIUM CHLORIDE: 600; 310; 30; 20 INJECTION, SOLUTION INTRAVENOUS at 11:19

## 2022-06-28 RX ADMIN — ACETAMINOPHEN 1000 MG: 500 TABLET, FILM COATED ORAL at 09:33

## 2022-06-28 RX ADMIN — SODIUM CHLORIDE, SODIUM LACTATE, POTASSIUM CHLORIDE, AND CALCIUM CHLORIDE: 600; 310; 30; 20 INJECTION, SOLUTION INTRAVENOUS at 09:33

## 2022-06-28 RX ADMIN — CIPROFLOXACIN 400 MG: 2 INJECTION, SOLUTION INTRAVENOUS at 11:30

## 2022-06-28 RX ADMIN — ONDANSETRON 4 MG: 2 INJECTION INTRAMUSCULAR; INTRAVENOUS at 11:30

## 2022-06-28 RX ADMIN — PROPOFOL 150 MG: 10 INJECTION, EMULSION INTRAVENOUS at 11:26

## 2022-06-28 NOTE — ANESTHESIA PRE PROCEDURE
Department of Anesthesiology  Preprocedure Note       Name:  Jeannette Marquez   Age:  52 y.o.  :  1975                                          MRN:  [id number]         Date:  2022      Surgeon: Jessica Henson): Lui Estrada MD    Procedure: Procedure(s):  CYSTOSCOPY BILATERAL RETROGRADE PYELOGRAM  BILATERAL CYSTOSCOPY URETERAL STENT EXCHANGE    Medications prior to admission:   Prior to Admission medications    Medication Sig Start Date End Date Taking? Authorizing Provider   rivaroxaban 15 & 20 MG Starter Pack Take as directed on package.  22   Mónica Blue MD   potassium chloride (KLOR-CON M) 20 MEQ extended release tablet Take 1 tablet by mouth 2 times daily 22   SCOTT Armstrong - CNP   ergocalciferol (ERGOCALCIFEROL) 1.25 MG (82117 UT) capsule Take 50,000 Units by mouth every 7 days Takes on Thursday 10/19/21   Ar Automatic Reconciliation   lidocaine-prilocaine (EMLA) 2.5-2.5 % cream Apply to port about 45 minutes prior to access 3/4/22   Ar Automatic Reconciliation   metoclopramide (REGLAN) 10 MG tablet Take 10 mg by mouth 4 times daily (before meals and nightly) 3/31/22 6/23/22  Ar Automatic Reconciliation   ondansetron (ZOFRAN) 8 MG tablet Take 8 mg by mouth every 8 hours as needed  3/4/22   Ar Automatic Reconciliation   pantoprazole (PROTONIX) 40 MG tablet 2 times daily 3/8/22   Ar Automatic Reconciliation   valACYclovir (VALTREX) 500 MG tablet Take 500 mg by mouth daily 21   Ar Automatic Reconciliation       Current medications:    Current Facility-Administered Medications   Medication Dose Route Frequency Provider Last Rate Last Admin    fentaNYL (SUBLIMAZE) injection 100 mcg  100 mcg IntraVENous Once PRN David Hdz MD        scopolamine (TRANSDERM-SCOP) transdermal patch 1 patch  1 patch TransDERmal Q72H David Hdz MD   1 patch at 22 0935    lactated ringers infusion   IntraVENous Continuous David Hdz  mL/hr at 22 6191 New Bag at 22 0933    sodium chloride flush 0.9 % injection 5-40 mL  5-40 mL IntraVENous 2 times per day Orlene Hatchet, MD        sodium chloride flush 0.9 % injection 5-40 mL  5-40 mL IntraVENous PRN Orlene Hatchet, MD        midazolam PF (VERSED) injection 2 mg  2 mg IntraVENous Once PRN Orlene Hatchet, MD        ciprofloxacin (CIPRO) IVPB 400 mg  400 mg IntraVENous On Call to OR Krystal Philippe MD   Held at 22 3307       Allergies:  No Known Allergies    Problem List:    Patient Active Problem List   Diagnosis Code    Fibroids D21.9    Paraesophageal hernia K44.9    Menorrhagia with regular cycle N92.0    Papanicolaou smear of cervix with low grade squamous intraepithelial lesion (LGSIL) R87.612    Iron deficiency anemia due to chronic blood loss D50.0    Anemia D64.9    History of weight loss surgery Z98.84    Carcinoma of unknown primary ([address] C80.1    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 David Li, nl (see media note), R     Hx of blood clots 2022    per pt \"small clot on lung identified by CT scan\"  CT Scan impression:- Nonobstructive pulmonary filling defect involving Left Lower Lobe     Hypotension     asymptomatic    Obstruction of fallopian tube     per pt has \"1 good tube\"    Peritoneal carcinomatosis ([address]     Weight loss     80lbs weight loss after gastric sleeve       Past Surgical History:        Procedure Laterality Date     SECTION  2009    GASTRIC BYPASS SURGERY  2014    gastric sleeve- Ch[facility]ari    HYSTERECTOMY (CERVIX STATUS UNKNOWN)      2018    HYSTERECTOMY (CERVIX STATUS UNKNOWN)  2020 [facility] w/ Bilateral salpingectomy and left oophorectomy    IR PORT PLACEMENT EQUAL OR GREATER THAN 5 YEARS  2/28/2022    IR PORT PLACEMENT EQUAL OR GREATER THAN 5 YEARS  2/28/2022    IR PORT PLACEMENT EQUAL OR GREATER THAN 5 YEARS 2/28/2022 [facility]    MYOMECTOMY  age [employer] 21[facility]\"    also \"unblocked her FT\"    TONSILLECTOMY      UPPER GASTROINTESTINAL ENDOSCOPY      with dilation    UROLOGICAL SURGERY Bilateral 03/15/2022    cysto       Social History:    Social History     Tobacco Use    Smoking status: Never Smoker    Smokeless tobacco: Never Used   Substance Use Topics    Alcohol use: Not Currently                                Counseling given: Not Answered      Vital Signs (Current):   Vitals:    06/24/22 0953 06/28/22 0911   BP:  (!) 169/95   Pulse:  77   Resp:  18   Temp:  98.5 °F (36.9 °C)   TempSrc:  Oral   SpO2:  100%   Weight: 112 lb (50.8 kg) 112 lb (50.8 kg)   Height: 5' 4\" (1.626 m)                                               BP Readings from Last 3 Encounters:   06/28/22 (!) 169/95   06/26/22 (!) 143/92   06/24/22 124/86       NPO Status: Time of last liquid consumption: 2300                        Time of last solid consumption: 2300                        Date of last liquid consumption: 06/27/22                        Date of last solid food consumption: 06/27/22    BMI:   Wt Readings from Last 3 Encounters:   06/28/22 112 lb (50.8 kg)   06/24/22 112 lb 6.4 oz (51 kg)   06/23/22 112 lb 3.2 oz (50.9 kg)     Body mass index is 19.22 kg/m².     CBC:   Lab Results   Component Value Date    WBC 5.9 06/23/2022    RBC 4.57 06/23/2022    HGB 13.3 06/23/2022    HCT 42.2 06/23/2022    MCV 92.3 06/23/2022    RDW 16.5 06/23/2022     06/23/2022       CMP:   Lab Results   Component Value Date     06/23/2022    K 4.4 06/23/2022     06/23/2022    CO2 30 06/23/2022    BUN 10 06/23/2022    CREATININE 0.80 06/23/2022    GFRAA >60 06/23/2022    AGRATIO 1.1 05/12/2022

## 2022-06-28 NOTE — BRIEF OP NOTE
Brief Postoperative Note      Patient: Carol Ackerman  YOB: 1975  MRN: [id number]    Date of Procedure: 6/28/2022    Pre-Op Diagnosis: Carcinoma of unknown primary ([address] [C80.1] and bilateral hydronephrosis    Post-Op Diagnosis: Same       Procedure(s):  CYSTOSCOPY BILATERAL RETROGRADE PYELOGRAM  BILATERAL CYSTOSCOPY URETERAL STENT EXCHANGE    Surgeon(s): Ananya Eldridge MD    Assistant:  * No surgical staff found *    Anesthesia: General    Estimated Blood Loss (mL): Minimal    Complications: None    Specimens:   * No specimens in log *    Implants:  Implant Name Type Inv. Item Serial No.  Lot No. LRB No. Used Action   STENT URET 6 FRX22 CM FIRM MONOFILAMENT TRIA - [id number]  STENT URET 6 FRX22 CM FIRM MONOFILAMENT TRIA  [employer]- [id number] Left 1 Implanted   STENT URET 6 FRX22 CM FIRM MONOFILAMENT TRIA - [id number]  STENT URET 6 FRX22 CM FIRM MONOFILAMENT TRIA  [employer]- [id number] Right 1 Implanted         Drains: * No LDAs found *    Findings: mild bilateral hydronephrosis; bilateral 6F x 22cm Tria stents placed.      Electronically signed by Ananya Eldridge MD on 6/28/2022 at 11:55 AM

## 2022-06-28 NOTE — PERIOP NOTE
Note Dianne score 4, report from CRNA that ETT \"pulled deep\", pt is still sedated from anesthesia, will reassess.
cologne, perfumes, powders, or oil on skin. Artificial nails are not permitted.

## 2022-06-28 NOTE — ANESTHESIA PROCEDURE NOTES
Airway  Urgency: elective      General Information and Staff    Patient location during procedure: OR  Anesthesiologist: Marlen Mera MD  Resident/CRNA: SCOTT Bates - CRNA  Performed: resident/CRNA     Indications and Patient Condition  Indications for airway management: anesthesia  Spontaneous ventilation: present  Preoxygenated: yes  Patient position: sniffing  Mask difficulty assessment: vent by bag mask    Final Airway Details  Final airway type: supraglottic airway      Successful airway: oropharyngeal  Size 4    Number of attempts at approach: 1  Ventilation between attempts: supraglottic airway

## 2022-06-28 NOTE — OP NOTE
[address] [place] [address]  OPERATIVE REPORT    Name:  Ramos Beckwith  MR#:  [id number]  :  1975  ACCOUNT #:  [de-identified]  DATE OF SERVICE:  2022    PREOPERATIVE DIAGNOSIS:  Bilateral hydronephrosis secondary to metastatic cancer. POSTOPERATIVE DIAGNOSIS:  Bilateral hydronephrosis secondary to metastatic cancer. PROCEDURE PERFORMED:  Cystoscopy with bilateral retrograde pyelograms and bilateral ureteral stent exchange. SURGEON:  Lui Estrada MD    ASSISTANT:  None. ANESTHESIA:  General with LMA. COMPLICATIONS:  None. SPECIMENS REMOVED:  None. IMPLANTS:  6-[place] x 22-cm double-J Tria ureteral stents bilaterally    ESTIMATED BLOOD LOSS:  Minimal.    INDICATION FOR THE PROCEDURE:  The patient is a very pleasant 59-year-old female with metastatic carcinoma who developed bilateral hydronephrosis requiring bilateral stent placement. She presents today for exchange of those stents. PROCEDURE:  After informed consent was obtained, she was taken to operating room 4 of the [address] [place] [address]. After induction of general anesthesia and placement of an LMA, she was carefully positioned in the low lithotomy position. Her genitalia were prepped and draped in the usual sterile fashion. A time-out was performed. She was given ciprofloxacin as a prophylactic antibiotic. I then inserted a 22-[place] rigid cystoscope and inspected her bladder. Her bladder appeared relatively normal with the exception of some very mild bullous edema around both the right and left ureteral orifice. The stents were in good position and there was very minimal encrustation. Stent graspers were inserted and the left ureteral stent was grasped and pulled back to the meatus. I then under fluoroscopic guidance passed a sensor guidewire through the stent. The stent was removed, and then over the guidewire, I placed a 5-[place]and[place] open-ended ureteral catheter.   I then obtained a retrograde pyelogram.    FINDINGS OF LEFT RETROGRADE PYELOGRAM:  There is still a relatively narrow mid and proximal ureter with mild to moderate hydronephrosis. There were no filling defects. Next, the sensor guidewire was replaced and the open-ended ureteral catheter was removed. The guidewire was then loaded back into the cystoscope and a 6-Citizen of [place] x 22-cm double-J Tria stent was placed. We did not leave a string attached. This was done under fluoroscopic and cystoscopic guidance. There was a good curl in the bladder as well as the collecting system. We then turned our attention to the right side and using stent graspers pulled the right ureteral stent back to the meatus. Through it, I placed a sensor guidewire under fluoroscopic guidance and also removed the right stent. Again, there was very minimal encrustation on it. I then placed a 5-Citizen of [place] open-ended ureteral catheter over the guidewire and obtained a retrograde pyelogram on the right side. FINDINGS OF RIGHT RETROGRADE PYELOGRAM:  Similarly, there was mild to moderate hydronephrosis. There were no filling defects. The mid and proximal ureter were narrowed but again no filling defects. I then replaced the sensor guidewire through the 5-Citizen of [place] open-ended ureteral catheter and backloaded that into the cystoscope. Next, a 6-Citizen of [place] x 22-cm double-J Tria stent was placed under fluoroscopic and cystoscopic guidance. I achieved a nice curl of the stent in the upper portion of the collecting system as well as the bladder. The bladder was then completely emptied and the cystoscope removed. The patient was awakened and LMA was removed. She was taken to the recovery room in stable condition. There were no known complications. DISPOSITION:  She will go home today with both stents in place.   I would like to see her back in my office in four to five months to discuss how her systemic therapy has gone and whether or not we will remove the stents or

## 2022-06-28 NOTE — ANESTHESIA POSTPROCEDURE EVALUATION
Department of Anesthesiology  Postprocedure Note    Patient: Burke Phoenix  MRN: [id number]  YOB: 1975  Date of evaluation: 6/28/2022      Procedure Summary     Date: 06/28/22 Room / Location: D OP OR 04 / [facility]    Anesthesia Start: 1119 Anesthesia Stop: 1200    Procedures:       CYSTOSCOPY BILATERAL RETROGRADE PYELOGRAM (Bilateral Ureter)      BILATERAL CYSTOSCOPY URETERAL STENT EXCHANGE (Bilateral Ureter) Diagnosis:       Cancer, meningeal carcinomatosis ([address]      Carcinoma of unknown primary ([address]      (Carcinoma of unknown primary ([address] [C80.1])    Surgeons: Sedrick Felty, MD Responsible Provider: Jennifer Munoz MD    Anesthesia Type: general ASA Status: 2          Anesthesia Type: No value filed.     Dianne Phase I: Dianne Score: 8    Dianne Phase II:        Anesthesia Post Evaluation    Patient location during evaluation: PACU  Patient participation: complete - patient participated  Level of consciousness: awake and awake and alert  Airway patency: patent  Nausea & Vomiting: no nausea  Complications: no  Cardiovascular status: hemodynamically stable  Respiratory status: acceptable  Hydration status: euvolemic

## 2022-06-28 NOTE — PROGRESS NOTES
Nutrition:  RD did not see pt at this visit.     [address] [facility], [address], [address] [facility]

## 2022-06-30 ENCOUNTER — HOSPITAL ENCOUNTER (OUTPATIENT)
Dept: GENERAL RADIOLOGY | Age: 47
Discharge: HOME | End: 2022-07-03

## 2022-07-07 ENCOUNTER — OFFICE VISIT (OUTPATIENT)
Dept: ONCOLOGY | Age: 47
End: 2022-07-07

## 2022-07-07 ENCOUNTER — HOSPITAL ENCOUNTER (OUTPATIENT)
Dept: LAB | Age: 47
Discharge: HOME | End: 2022-07-10
Payer: COMMERCIAL

## 2022-07-07 ENCOUNTER — OFFICE VISIT (OUTPATIENT)
Dept: ONCOLOGY | Age: 47
End: 2022-07-07
Payer: COMMERCIAL

## 2022-07-07 ENCOUNTER — CLINICAL DOCUMENTATION (OUTPATIENT)
Dept: CASE MANAGEMENT | Age: 47
End: 2022-07-07

## 2022-07-07 VITALS
WEIGHT: 113.3 LBS | SYSTOLIC BLOOD PRESSURE: 135 MMHG | HEART RATE: 87 BPM | BODY MASS INDEX: 19.45 KG/M2 | OXYGEN SATURATION: 100 % | RESPIRATION RATE: 18 BRPM | TEMPERATURE: 97.5 F | DIASTOLIC BLOOD PRESSURE: 94 MMHG

## 2022-07-07 DIAGNOSIS — Z00.8 NUTRITIONAL ASSESSMENT: Primary | ICD-10-CM

## 2022-07-07 DIAGNOSIS — C78.6 METASTASIS TO PERITONEUM OF UNKNOWN PRIMARY (HCC): ICD-10-CM

## 2022-07-07 DIAGNOSIS — D50.0 IRON DEFICIENCY ANEMIA DUE TO CHRONIC BLOOD LOSS: ICD-10-CM

## 2022-07-07 DIAGNOSIS — G62.0 CHEMOTHERAPY-INDUCED NEUROPATHY (HCC): ICD-10-CM

## 2022-07-07 DIAGNOSIS — C80.1 METASTASIS TO PERITONEUM OF UNKNOWN PRIMARY (HCC): ICD-10-CM

## 2022-07-07 DIAGNOSIS — T45.1X5A CHEMOTHERAPY-INDUCED NEUROPATHY (HCC): ICD-10-CM

## 2022-07-07 DIAGNOSIS — C80.1 CARCINOMA OF UNKNOWN PRIMARY (HCC): Primary | ICD-10-CM

## 2022-07-07 DIAGNOSIS — C80.1 CARCINOMA OF UNKNOWN PRIMARY (HCC): ICD-10-CM

## 2022-07-07 DIAGNOSIS — R31.9 HEMATURIA, UNSPECIFIED TYPE: ICD-10-CM

## 2022-07-07 LAB
ALBUMIN SERPL-MCNC: 3.6 G/DL (ref 3.5–5)
ALBUMIN/GLOB SERPL: 0.9 {RATIO} (ref 1.2–3.5)
ALP SERPL-CCNC: 69 U/L (ref 50–136)
ALT SERPL-CCNC: 28 U/L (ref 12–65)
ANION GAP SERPL CALC-SCNC: 6 MMOL/L (ref 7–16)
AST SERPL-CCNC: 21 U/L (ref 15–37)
BASOPHILS # BLD: 0 K/UL (ref 0–0.2)
BASOPHILS NFR BLD: 0 % (ref 0–2)
BILIRUB SERPL-MCNC: 0.3 MG/DL (ref 0.2–1.1)
BUN SERPL-MCNC: 10 MG/DL (ref 6–23)
CALCIUM SERPL-MCNC: 9.5 MG/DL (ref 8.3–10.4)
CHLORIDE SERPL-SCNC: 103 MMOL/L (ref 98–107)
CO2 SERPL-SCNC: 28 MMOL/L (ref 21–32)
CREAT SERPL-MCNC: 0.7 MG/DL (ref 0.6–1)
DIFFERENTIAL METHOD BLD: ABNORMAL
EOSINOPHIL # BLD: 0.1 K/UL (ref 0–0.8)
EOSINOPHIL NFR BLD: 2 % (ref 0.5–7.8)
ERYTHROCYTE [DISTWIDTH] IN BLOOD BY AUTOMATED COUNT: 17.1 % (ref 11.9–14.6)
GLOBULIN SER CALC-MCNC: 3.9 G/DL (ref 2.3–3.5)
GLUCOSE SERPL-MCNC: 99 MG/DL (ref 65–100)
HCT VFR BLD AUTO: 37.5 % (ref 35.8–46.3)
HGB BLD-MCNC: 11.7 G/DL (ref 11.7–15.4)
IMM GRANULOCYTES # BLD AUTO: 0 K/UL (ref 0–0.5)
IMM GRANULOCYTES NFR BLD AUTO: 0 % (ref 0–5)
LYMPHOCYTES # BLD: 1.7 K/UL (ref 0.5–4.6)
LYMPHOCYTES NFR BLD: 31 % (ref 13–44)
MAGNESIUM SERPL-MCNC: 1.9 MG/DL (ref 1.8–2.4)
MCH RBC QN AUTO: 29.8 PG (ref 26.1–32.9)
MCHC RBC AUTO-ENTMCNC: 31.2 G/DL (ref 31.4–35)
MCV RBC AUTO: 95.4 FL (ref 79.6–97.8)
MONOCYTES # BLD: 0.9 K/UL (ref 0.1–1.3)
MONOCYTES NFR BLD: 17 % (ref 4–12)
NEUTS SEG # BLD: 2.8 K/UL (ref 1.7–8.2)
NEUTS SEG NFR BLD: 50 % (ref 43–78)
NRBC # BLD: 0 K/UL (ref 0–0.2)
PLATELET # BLD AUTO: 142 K/UL (ref 150–450)
PMV BLD AUTO: 9.3 FL (ref 9.4–12.3)
POTASSIUM SERPL-SCNC: 4.4 MMOL/L (ref 3.5–5.1)
PROT SERPL-MCNC: 7.5 G/DL (ref 6.3–8.2)
PROT UR-MCNC: 278 MG/DL
RBC # BLD AUTO: 3.93 M/UL (ref 4.05–5.2)
SODIUM SERPL-SCNC: 137 MMOL/L (ref 136–145)
WBC # BLD AUTO: 5.5 K/UL (ref 4.3–11.1)

## 2022-07-07 PROCEDURE — 83735 ASSAY OF MAGNESIUM: CPT

## 2022-07-07 PROCEDURE — 36415 COLL VENOUS BLD VENIPUNCTURE: CPT

## 2022-07-07 PROCEDURE — 84156 ASSAY OF PROTEIN URINE: CPT

## 2022-07-07 PROCEDURE — 80053 COMPREHEN METABOLIC PANEL: CPT

## 2022-07-07 PROCEDURE — 85025 COMPLETE CBC W/AUTO DIFF WBC: CPT

## 2022-07-07 PROCEDURE — 99214 OFFICE O/P EST MOD 30 MIN: CPT | Performed by: NURSE PRACTITIONER

## 2022-07-07 RX ORDER — SODIUM CHLORIDE 9 MG/ML
INJECTION, SOLUTION INTRAVENOUS CONTINUOUS
Status: CANCELLED | OUTPATIENT
Start: 2022-07-08

## 2022-07-07 RX ORDER — ONDANSETRON 2 MG/ML
8 INJECTION INTRAMUSCULAR; INTRAVENOUS ONCE
Status: CANCELLED | OUTPATIENT
Start: 2022-07-08 | End: 2022-07-08

## 2022-07-07 RX ORDER — HEPARIN SODIUM (PORCINE) LOCK FLUSH IV SOLN 100 UNIT/ML 100 UNIT/ML
500 SOLUTION INTRAVENOUS PRN
Status: CANCELLED | OUTPATIENT
Start: 2022-07-10

## 2022-07-07 RX ORDER — ONDANSETRON 2 MG/ML
8 INJECTION INTRAMUSCULAR; INTRAVENOUS
Status: CANCELLED | OUTPATIENT
Start: 2022-07-08

## 2022-07-07 RX ORDER — SODIUM CHLORIDE 9 MG/ML
5-250 INJECTION, SOLUTION INTRAVENOUS PRN
Status: CANCELLED | OUTPATIENT
Start: 2022-07-08

## 2022-07-07 RX ORDER — SODIUM CHLORIDE 9 MG/ML
5-40 INJECTION INTRAVENOUS PRN
Status: CANCELLED | OUTPATIENT
Start: 2022-07-08

## 2022-07-07 RX ORDER — SODIUM CHLORIDE 9 MG/ML
5-250 INJECTION, SOLUTION INTRAVENOUS PRN
Status: CANCELLED | OUTPATIENT
Start: 2022-07-10

## 2022-07-07 RX ORDER — ALBUTEROL SULFATE 90 UG/1
4 AEROSOL, METERED RESPIRATORY (INHALATION) PRN
Status: CANCELLED | OUTPATIENT
Start: 2022-07-08

## 2022-07-07 RX ORDER — ACETAMINOPHEN 325 MG/1
650 TABLET ORAL
Status: CANCELLED | OUTPATIENT
Start: 2022-07-08

## 2022-07-07 RX ORDER — SODIUM CHLORIDE 9 MG/ML
5-40 INJECTION INTRAVENOUS PRN
Status: CANCELLED | OUTPATIENT
Start: 2022-07-10

## 2022-07-07 RX ORDER — DEXTROSE MONOHYDRATE 50 MG/ML
5-250 INJECTION, SOLUTION INTRAVENOUS PRN
Status: CANCELLED | OUTPATIENT
Start: 2022-07-08

## 2022-07-07 RX ORDER — HEPARIN SODIUM (PORCINE) LOCK FLUSH IV SOLN 100 UNIT/ML 100 UNIT/ML
500 SOLUTION INTRAVENOUS PRN
Status: CANCELLED | OUTPATIENT
Start: 2022-07-08

## 2022-07-07 RX ORDER — SODIUM CHLORIDE 0.9 % (FLUSH) 0.9 %
5-40 SYRINGE (ML) INJECTION PRN
Status: CANCELLED | OUTPATIENT
Start: 2022-07-10

## 2022-07-07 RX ORDER — DIPHENHYDRAMINE HYDROCHLORIDE 50 MG/ML
50 INJECTION INTRAMUSCULAR; INTRAVENOUS
Status: CANCELLED | OUTPATIENT
Start: 2022-07-08

## 2022-07-07 RX ORDER — MEPERIDINE HYDROCHLORIDE 50 MG/ML
12.5 INJECTION INTRAMUSCULAR; INTRAVENOUS; SUBCUTANEOUS PRN
Status: CANCELLED | OUTPATIENT
Start: 2022-07-08

## 2022-07-07 RX ORDER — FAMOTIDINE 10 MG/ML
20 INJECTION, SOLUTION INTRAVENOUS
Status: CANCELLED | OUTPATIENT
Start: 2022-07-08

## 2022-07-07 RX ORDER — FLUOROURACIL 50 MG/ML
400 INJECTION, SOLUTION INTRAVENOUS ONCE
Status: CANCELLED | OUTPATIENT
Start: 2022-07-08 | End: 2022-07-08

## 2022-07-07 RX ORDER — SODIUM CHLORIDE 0.9 % (FLUSH) 0.9 %
5-40 SYRINGE (ML) INJECTION PRN
Status: CANCELLED | OUTPATIENT
Start: 2022-07-08

## 2022-07-07 RX ORDER — EPINEPHRINE 1 MG/ML
0.3 INJECTION, SOLUTION, CONCENTRATE INTRAVENOUS PRN
Status: CANCELLED | OUTPATIENT
Start: 2022-07-08

## 2022-07-07 ASSESSMENT — PATIENT HEALTH QUESTIONNAIRE - PHQ9
SUM OF ALL RESPONSES TO PHQ QUESTIONS 1-9: 0
SUM OF ALL RESPONSES TO PHQ QUESTIONS 1-9: 0
1. LITTLE INTEREST OR PLEASURE IN DOING THINGS: 0
2. FEELING DOWN, DEPRESSED OR HOPELESS: 0
SUM OF ALL RESPONSES TO PHQ9 QUESTIONS 1 & 2: 0
SUM OF ALL RESPONSES TO PHQ QUESTIONS 1-9: 0
SUM OF ALL RESPONSES TO PHQ QUESTIONS 1-9: 0

## 2022-07-07 NOTE — PROGRESS NOTES
7/7/22 saw pt today with Bear Ritter NP for pre chemo cycle 8 FOLFOX. Avastin on hold. She is seeing Dr. Jennifer Linares next week for surgical evaluation. She is feeling well. PO intake is good. Denies any issues. Infusion tomorrow. Follow up in 2 weeks. Encouraged to call with any concerns. Navigation will continue to follow.

## 2022-07-07 NOTE — PROGRESS NOTES
HEMATOLOGY/ONCOLOGY FOLLOWUP  VISIT      Patient Name: Brian Clinton             Date of Visit: 2022  : 1975  Age:47 y.o. Presenting Complaint:  Brian Clinton  is seen in follow-up for carcinoma of unknown primary. History of Present Illness:  Ms. Walker was seen for the first time in our office in 2022. She was a woman of previously good health who began noticing left-sided back discomfort in early 2022. This was associated  ultimately with a sense of difficulty swallowing and ultimately she presented to [facility] for evaluation. Her symptoms were felt to potentially be indicative of a bowel obstruction and she was sent for a CT scan. This study, performed on 2022  was notable for a linear calcific density along the course of the proximal left ureter with associated moderate hydronephrosis potentially indicative of an intraureteral calculus. There was also stranding throughout the retroperitoneal soft tissues anterior  to the left psoas muscle with pelvic ascites. There was also wall thickening involving the descending colon. The question of colitis or serosal implants was raised. The patient had undergone a gastric sleeve placement several years prior. She had  also undergone a negative colonoscopy about 3 years prior to these presentations. There was a history of anemia ultimately resulting in the performance of a hysterectomy approximately 3 years ago for menorrhagia. Because of the CT scan findings, she  was ultimately referred to Dr. Rocio Salazar for evaluation of a possible pelvic malignancy. On 2022 he performed a laparoscopy and biopsy with evidence of peritoneal carcinomatosis grossly. A \"peritoneal nodule\" and a \"peritoneal implant\"  were both positive for a poorly differentiated metastatic carcinoma potentially consistent with an upper gastrointestinal primary.   An omental biopsy showed no evidence of malignancy. Immunohistochemistries were positive for cytokeratin 7 and negative  for cytokeratin 20. The tumor was weakly positive for CDX2. The only other positive study was MOC-31. A PET scan was subsequently performed on  showing elevated FDG uptake in the left pelvis corresponding with a masslike density concerning  for peritoneal carcinomatosis. There was a small volume of free fluid within the peritoneal cavity. There was moderate right hydroureteronephrosis. Despite the pathologic findings, there was no evidence of FDG activity in the upper gastrointestinal  tract. CA-125 was slightly elevated at 44. Given the uncertainty of her diagnosis, the patient went back for additional surgery on February 15 at which time she underwent bilateral ureteral stent placement and bilateral salpingo-oophorectomies. Pathology  from that surgery was notable for poorly differentiated carcinoma with occasional signet ring features. Immunohistochemical stains were most consistent with a tumor of the upper gastrointestinal tract. She is  1 para 1 abortus 0. There is no  family history of cancer. She has had no difficulties with vomiting. She still notes that some food traverses her esophagus slowly. She had undergone a prior dilatation without any evidence of cancer. She subsequently began treatment with FOLFOX ultimately  with the addition of bevacizumab in 2022. She returns today for follow up and consideration of cycle 8 FOLFOX. Avastin was held last cycle for possible plans for HIPEC. She is scheduled to see Dr. Nba Frazier and discuss early next week. Since last seen, she had her bilateral ureteral stents exchanged. She is still having some hematuria. Her energy is good, appetite is good as well with improved weight. She had some taste alterations but this did not prohibit eating. She denies any nausea, vomiting, mucositis or bowel troubles. She denies any pain.  No respiratory symptoms. No lower extremity swelling. She denies any recent fevers, chills or other infectious symptoms. Neuropathy is stable and not affecting ADL's. Medications:   Current Outpatient Medications   Medication Sig Dispense Refill    rivaroxaban 15 & 20 MG Starter Pack Take as directed on package. 1 each 0    potassium chloride (KLOR-CON M) 20 MEQ extended release tablet Take 1 tablet by mouth 2 times daily 60 tablet 1    ergocalciferol (ERGOCALCIFEROL) 1.25 MG (36053 UT) capsule Take 50,000 Units by mouth every 7 days Takes on Thursday      lidocaine-prilocaine (EMLA) 2.5-2.5 % cream Apply to port about 45 minutes prior to access      pantoprazole (PROTONIX) 40 MG tablet 2 times daily      valACYclovir (VALTREX) 500 MG tablet Take 500 mg by mouth daily      metoclopramide (REGLAN) 10 MG tablet Take 10 mg by mouth 4 times daily (before meals and nightly)      ondansetron (ZOFRAN) 8 MG tablet Take 8 mg by mouth every 8 hours as needed  (Patient not taking: Reported on 7/7/2022)       No current facility-administered medications for this visit. Allergies:  No Known Allergies    Review of Systems: The Review of Systems is documented in full in the internal medical record. All systems are negative other than for those noted above. Past Medical History:  Documented in electronic medical record    Past Surgical History:  Documented in electronic medical record    Social History:  Documented in electronic medical record    Family History:  Documented in electronic medical record      Physical Examination:  General Appearance: Healthy appearing patient in no acute distress.    Vital signs: BP (!) 135/94 (Site: Left Upper Arm, Position: Standing)   Pulse 87   Temp 97.5 °F (36.4 °C)   Resp 18   Wt 113 lb 4.8 oz (51.4 kg)   SpO2 100%   BMI 19.45 kg/m²     Performance status: ECOG Level: 0  Distress  Screening Score: PHQ-9 Total Score: 0 (7/7/2022 12:22 PM)  Pain Score:   0 - No pain    HEENT: No oral exam.  Neck: Supple. There is no thyromegaly. Lymph nodes: There is no cervical, supraclavicular, axillary adenopathy. Breasts: Not examined. Lungs: The lungs are clear to auscultation. There is no chest wall tenderness and no use of accessory respiratory musculature. Heart: There is no jugular venous distention. The rate is normal and rhythm regular. The S1 and S2 are normal and there are no murmurs or rubs. Abdomen: Soft, non-tender, bowel sounds present and normal, no appreciated hepatosplenomegaly. No palpable masses. Skin: No rash, petechiae or ecchymoses. No evidence of malignancy. Extremities: No cyanosis, clubbing or edema. Neurological: No obvious deficits.          Labs/Imaging:  Hospital Outpatient Visit on 07/07/2022   Component Date Value Ref Range Status    WBC 07/07/2022 5.5  4.3 - 11.1 K/uL Final    RBC 07/07/2022 3.93* 4.05 - 5.2 M/uL Final    Hemoglobin 07/07/2022 11.7  11.7 - 15.4 g/dL Final    Hematocrit 07/07/2022 37.5  35.8 - 46.3 % Final    MCV 07/07/2022 95.4  79.6 - 97.8 FL Final    MCH 07/07/2022 29.8  26.1 - 32.9 PG Final    MCHC 07/07/2022 31.2* 31.4 - 35.0 g/dL Final    RDW 07/07/2022 17.1* 11.9 - 14.6 % Final    Platelets 49/38/4180 142* 150 - 450 K/uL Final    MPV 07/07/2022 9.3* 9.4 - 12.3 FL Final    nRBC 07/07/2022 0.00  0.0 - 0.2 K/uL Final    **Note: Absolute NRBC parameter is now reported with Hemogram**    Seg Neutrophils 07/07/2022 50  43 - 78 % Final    Lymphocytes 07/07/2022 31  13 - 44 % Final    Monocytes 07/07/2022 17* 4.0 - 12.0 % Final    Eosinophils % 07/07/2022 2  0.5 - 7.8 % Final    Basophils 07/07/2022 0  0.0 - 2.0 % Final    Immature Granulocytes 07/07/2022 0  0.0 - 5.0 % Final    Segs Absolute 07/07/2022 2.8  1.7 - 8.2 K/UL Final    Absolute Lymph # 07/07/2022 1.7  0.5 - 4.6 K/UL Final    Absolute Mono # 07/07/2022 0.9  0.1 - 1.3 K/UL Final    Absolute Eos # 07/07/2022 0.1  0.0 - 0.8 K/UL Final    Basophils Absolute 07/07/2022 0.0  0.0 - 0.2 K/UL Final    Absolute Immature Granulocyte 07/07/2022 0.0  0.0 - 0.5 K/UL Final    Differential Type 07/07/2022 AUTOMATED    Final    Sodium 07/07/2022 137  136 - 145 mmol/L Final    Potassium 07/07/2022 4.4  3.5 - 5.1 mmol/L Final    Chloride 07/07/2022 103  98 - 107 mmol/L Final    CO2 07/07/2022 28  21 - 32 mmol/L Final    Anion Gap 07/07/2022 6* 7 - 16 mmol/L Final    Glucose 07/07/2022 99  65 - 100 mg/dL Final    BUN 07/07/2022 10  6 - 23 MG/DL Final    CREATININE 07/07/2022 0.70  0.6 - 1.0 MG/DL Final    GFR  07/07/2022 >60  >60 ml/min/1.73m2 Final    GFR Non- 07/07/2022 >60  >60 ml/min/1.73m2 Final    Comment:      Estimated GFR is calculated using the Modification of Diet in Renal Disease (MDRD) Study equation, reported for both  Americans (GFRAA) and non- Americans (GFRNA), and normalized to 1.73m2 body surface area. The physician must decide which value applies to the patient. The MDRD study equation should only be used in individuals age 25 or older. It has not been validated for the following: pregnant women, patients with serious comorbid conditions,or on certain medications, or persons with extremes of body size, muscle mass, or nutritional status.  Calcium 07/07/2022 9.5  8.3 - 10.4 MG/DL Final    Total Bilirubin 07/07/2022 0.3  0.2 - 1.1 MG/DL Final    ALT 07/07/2022 28  12 - 65 U/L Final    AST 07/07/2022 21  15 - 37 U/L Final    Alk Phosphatase 07/07/2022 69  50 - 136 U/L Final    Total Protein 07/07/2022 7.5  6.3 - 8.2 g/dL Final    Albumin 07/07/2022 3.6  3.5 - 5.0 g/dL Final    Globulin 07/07/2022 3.9* 2.3 - 3.5 g/dL Final    Albumin/Globulin Ratio 07/07/2022 0.9* 1.2 - 3.5   Final    Magnesium 07/07/2022 1.9  1.8 - 2.4 mg/dL Final    Protein, Urine, Random 07/07/2022 278* <11.9 mg/dL Final     Above results reviewed with patient. ICD-10-CM    1.  Carcinoma of unknown primary ([facility] [address]  C80.1 CBC with Auto Differential     Comprehensive Metabolic Panel     Magnesium     Ferritin     Transferrin Saturation     CBC With Auto Differential     Comprehensive metabolic panel     Urinalysis   2. Iron deficiency anemia due to chronic blood loss  D50.0 CBC with Auto Differential     Comprehensive Metabolic Panel     Magnesium     Ferritin     Transferrin Saturation   3. 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 Chemotherapy-induced neuropathy ([facility] [address]  G62.0     T45. 1X5A    5. Hematuria, unspecified type  R31.9        ASSESSMENT and PLAN:   Ms. Claudetta Heman has an apparent metastatic carcinoma of unknown primary. Pathologically, the tumor seems to resemble a process arising in the upper gastrointestinal tract. However, any such primary is  not visible on EGD or PET scanning. Dr. Hong Bateman presumption is that this may have originated from the colon based on its location and behavior. That too would be somewhat inconsistent with the pathology findings. There is no evidence of disease progression on recent CT scanning. There is a 7 mm pelvic sidewall node and some thickening of the bladder but otherwise no definitive masses or progression of disease. The patient is tolerating her treatment well and if anything has a better performance status now than she had at the beginning. She returns today for follow up and consideration of cycle 8 FOLFOX. Avastin was held last cycle for possible plans for HIPEC. She is scheduled to see Dr. Myesha Martinez and discuss early next week. Since last seen, she had her bilateral ureteral stents exchanged. She is still having some hematuria. Her energy is good, appetite is good as well with improved weight. Encouraged frequent activity throughout the day and rest as needed to combat fatigue. Continue good oral nutrition and hydration. She had some taste alterations but this did not prohibit eating. She denies any nausea, vomiting, mucositis or bowel troubles.   She denies any pain. No respiratory symptoms. No lower extremity swelling. She denies any recent fevers, chills or other infectious symptoms. Neuropathy is stable and not affecting ADL's - continue to monitor. Labs reviewed and okay to proceed with cycle 8 FOLFOX without Avastin as Dr. Myesha Martinez may want to perform HIPEC soon. Hgb down to 11.7 from 13 - most likely related to recent stent exchange. Will check iron labs with next visit. Call with any fevers, uncontrolled side effects from treatment or any other worrisome/concerning symptoms. Follow up in two weeks for cycle 9 or sooner if needed.              [address] [facility] ABNER, NP-C  [address] Hematology Oncology  [address]  P [phone]

## 2022-07-07 NOTE — PATIENT INSTRUCTIONS
Patient Instructions from Today's Visit    Reason for Visit:  Pre chemo cycle 8 FOLFOX   avastin on hold     Plan:  Infusion tomorrow    Follow Up:  2 weeks    Recent Lab Results:  Hospital Outpatient Visit on 07/07/2022   Component Date Value Ref Range Status    WBC 07/07/2022 5.5  4.3 - 11.1 K/uL Final    RBC 07/07/2022 3.93* 4.05 - 5.2 M/uL Final    Hemoglobin 07/07/2022 11.7  11.7 - 15.4 g/dL Final    Hematocrit 07/07/2022 37.5  35.8 - 46.3 % Final    MCV 07/07/2022 95.4  79.6 - 97.8 FL Final    MCH 07/07/2022 29.8  26.1 - 32.9 PG Final    MCHC 07/07/2022 31.2* 31.4 - 35.0 g/dL Final    RDW 07/07/2022 17.1* 11.9 - 14.6 % Final    Platelets 45/24/7002 142* 150 - 450 K/uL Final    MPV 07/07/2022 9.3* 9.4 - 12.3 FL Final    nRBC 07/07/2022 0.00  0.0 - 0.2 K/uL Final    **Note: Absolute NRBC parameter is now reported with Hemogram**    Seg Neutrophils 07/07/2022 50  43 - 78 % Final    Lymphocytes 07/07/2022 31  13 - 44 % Final    Monocytes 07/07/2022 17* 4.0 - 12.0 % Final    Eosinophils % 07/07/2022 2  0.5 - 7.8 % Final    Basophils 07/07/2022 0  0.0 - 2.0 % Final    Immature Granulocytes 07/07/2022 0  0.0 - 5.0 % Final    Segs Absolute 07/07/2022 2.8  1.7 - 8.2 K/UL Final    Absolute Lymph # 07/07/2022 1.7  0.5 - 4.6 K/UL Final    Absolute Mono # 07/07/2022 0.9  0.1 - 1.3 K/UL Final    Absolute Eos # 07/07/2022 0.1  0.0 - 0.8 K/UL Final    Basophils Absolute 07/07/2022 0.0  0.0 - 0.2 K/UL Final    Absolute Immature Granulocyte 07/07/2022 0.0  0.0 - 0.5 K/UL Final    Differential Type 07/07/2022 AUTOMATED    Final         Treatment Summary has been discussed and given to patient: no        -------------------------------------------------------------------------------------------------------------------  Please call our office at [phone] if you have any  of the following symptoms:   · Fever of 100.5 or greater  · Chills  · Shortness of breath  · Swelling or pain in one leg    After office hours an answering service is available and will contact a provider for emergencies or if you are experiencing any of the above symptoms.  Patient did express an interest in My Chart. My Chart log in information explained on the after visit summary printout at the [facility] [address] desk.     Rashmi Araiza RN, BSN  Nurse Navigator  [phone] cell  [email or web address]

## 2022-07-08 ENCOUNTER — HOSPITAL ENCOUNTER (OUTPATIENT)
Dept: INFUSION THERAPY | Age: 47
Discharge: HOME | End: 2022-07-08
Payer: COMMERCIAL

## 2022-07-08 VITALS
HEART RATE: 90 BPM | RESPIRATION RATE: 18 BRPM | TEMPERATURE: 98.4 F | OXYGEN SATURATION: 97 % | WEIGHT: 114.2 LBS | DIASTOLIC BLOOD PRESSURE: 88 MMHG | BODY MASS INDEX: 19.6 KG/M2 | SYSTOLIC BLOOD PRESSURE: 147 MMHG

## 2022-07-08 DIAGNOSIS — C80.1 CARCINOMA OF UNKNOWN PRIMARY (HCC): Primary | ICD-10-CM

## 2022-07-08 PROCEDURE — 96523 IRRIG DRUG DELIVERY DEVICE: CPT

## 2022-07-08 PROCEDURE — 6360000002 HC RX W HCPCS: Performed by: NURSE PRACTITIONER

## 2022-07-08 PROCEDURE — 96411 CHEMO IV PUSH ADDL DRUG: CPT

## 2022-07-08 PROCEDURE — 96375 TX/PRO/DX INJ NEW DRUG ADDON: CPT

## 2022-07-08 PROCEDURE — 96367 TX/PROPH/DG ADDL SEQ IV INF: CPT

## 2022-07-08 PROCEDURE — 96368 THER/DIAG CONCURRENT INF: CPT

## 2022-07-08 PROCEDURE — 96413 CHEMO IV INFUSION 1 HR: CPT

## 2022-07-08 PROCEDURE — 96415 CHEMO IV INFUSION ADDL HR: CPT

## 2022-07-08 PROCEDURE — 96417 CHEMO IV INFUS EACH ADDL SEQ: CPT

## 2022-07-08 PROCEDURE — 2580000003 HC RX 258: Performed by: NURSE PRACTITIONER

## 2022-07-08 RX ORDER — DEXTROSE MONOHYDRATE 50 MG/ML
5-250 INJECTION, SOLUTION INTRAVENOUS PRN
Status: DISCONTINUED | OUTPATIENT
Start: 2022-07-08 | End: 2022-07-09 | Stop reason: HOSPADM

## 2022-07-08 RX ORDER — SODIUM CHLORIDE 0.9 % (FLUSH) 0.9 %
5-40 SYRINGE (ML) INJECTION PRN
Status: DISCONTINUED | OUTPATIENT
Start: 2022-07-08 | End: 2022-07-09 | Stop reason: HOSPADM

## 2022-07-08 RX ORDER — ONDANSETRON 2 MG/ML
8 INJECTION INTRAMUSCULAR; INTRAVENOUS ONCE
Status: COMPLETED | OUTPATIENT
Start: 2022-07-08 | End: 2022-07-08

## 2022-07-08 RX ORDER — FLUOROURACIL 50 MG/ML
400 INJECTION, SOLUTION INTRAVENOUS ONCE
Status: COMPLETED | OUTPATIENT
Start: 2022-07-08 | End: 2022-07-08

## 2022-07-08 RX ADMIN — SODIUM CHLORIDE, PRESERVATIVE FREE 10 ML: 5 INJECTION INTRAVENOUS at 08:58

## 2022-07-08 RX ADMIN — FLUOROURACIL 3825 MG: 50 INJECTION, SOLUTION INTRAVENOUS at 12:18

## 2022-07-08 RX ADMIN — DEXTROSE MONOHYDRATE 50 ML/HR: 50 INJECTION, SOLUTION INTRAVENOUS at 08:58

## 2022-07-08 RX ADMIN — FLUOROURACIL 625 MG: 50 INJECTION, SOLUTION INTRAVENOUS at 12:15

## 2022-07-08 RX ADMIN — DEXAMETHASONE SODIUM PHOSPHATE 12 MG: 4 INJECTION, SOLUTION INTRAMUSCULAR; INTRAVENOUS at 09:25

## 2022-07-08 RX ADMIN — LEUCOVORIN CALCIUM 650 MG: 350 INJECTION, POWDER, LYOPHILIZED, FOR SOLUTION INTRAMUSCULAR; INTRAVENOUS at 10:10

## 2022-07-08 RX ADMIN — OXALIPLATIN 135 MG: 5 INJECTION, SOLUTION INTRAVENOUS at 10:10

## 2022-07-08 RX ADMIN — ONDANSETRON 8 MG: 2 INJECTION INTRAMUSCULAR; INTRAVENOUS at 09:21

## 2022-07-08 NOTE — PROGRESS NOTES
7/8/2022  Pt ambulatory to Infusion without complaints. Labs reviewed and pt received treatment per order, tolerated well. Adrucil pump attached with clamps verified by 2nd RN. Patient instructed to call provider with temperature of 100.4 or greater, nausea/vomiting/diarrhea/pain not controlled by medications, or any other issues/concerns. Aware of next Infusion appt on [email or web address]. Discharged home.

## 2022-07-10 ENCOUNTER — HOSPITAL ENCOUNTER (OUTPATIENT)
Dept: INFUSION THERAPY | Age: 47
Discharge: HOME | End: 2022-07-10
Payer: COMMERCIAL

## 2022-07-10 VITALS
TEMPERATURE: 98 F | RESPIRATION RATE: 16 BRPM | SYSTOLIC BLOOD PRESSURE: 119 MMHG | HEART RATE: 77 BPM | DIASTOLIC BLOOD PRESSURE: 83 MMHG

## 2022-07-10 DIAGNOSIS — C80.1 CARCINOMA OF UNKNOWN PRIMARY (HCC): Primary | ICD-10-CM

## 2022-07-10 PROCEDURE — 96523 IRRIG DRUG DELIVERY DEVICE: CPT

## 2022-07-10 PROCEDURE — 2580000003 HC RX 258: Performed by: NURSE PRACTITIONER

## 2022-07-10 RX ORDER — SODIUM CHLORIDE 0.9 % (FLUSH) 0.9 %
5-40 SYRINGE (ML) INJECTION PRN
Status: DISCONTINUED | OUTPATIENT
Start: 2022-07-10 | End: 2022-07-11 | Stop reason: HOSPADM

## 2022-07-10 RX ADMIN — SODIUM CHLORIDE, PRESERVATIVE FREE 10 ML: 5 INJECTION INTRAVENOUS at 12:32

## 2022-07-10 NOTE — PROGRESS NOTES
Pt arrived ambulatory, chemo pump removed, pt tolerated well, discharged home ambulatory, next Infusion appt 7/22 at 0900

## 2022-07-11 DIAGNOSIS — R31.9 HEMATURIA, UNSPECIFIED TYPE: Primary | ICD-10-CM

## 2022-07-12 ENCOUNTER — PREP FOR PROCEDURE (OUTPATIENT)
Dept: SURGERY | Age: 47
End: 2022-07-12

## 2022-07-12 ENCOUNTER — OFFICE VISIT (OUTPATIENT)
Dept: SURGERY | Age: 47
End: 2022-07-12
Payer: COMMERCIAL

## 2022-07-12 VITALS
HEIGHT: 64 IN | SYSTOLIC BLOOD PRESSURE: 122 MMHG | BODY MASS INDEX: 19.29 KG/M2 | OXYGEN SATURATION: 98 % | DIASTOLIC BLOOD PRESSURE: 80 MMHG | HEART RATE: 78 BPM | WEIGHT: 113 LBS

## 2022-07-12 DIAGNOSIS — C80.1 CARCINOMA OF UNKNOWN PRIMARY (HCC): ICD-10-CM

## 2022-07-12 DIAGNOSIS — C78.6 METASTASIS TO PERITONEUM OF UNKNOWN PRIMARY (HCC): Primary | ICD-10-CM

## 2022-07-12 DIAGNOSIS — C80.1 METASTASIS TO PERITONEUM OF UNKNOWN PRIMARY (HCC): Primary | ICD-10-CM

## 2022-07-12 DIAGNOSIS — N13.30 BILATERAL HYDRONEPHROSIS: ICD-10-CM

## 2022-07-12 PROBLEM — C48.2 PERITONEAL CARCINOMA (HCC): Status: ACTIVE | Noted: 2022-07-12

## 2022-07-12 PROCEDURE — 99205 OFFICE O/P NEW HI 60 MIN: CPT | Performed by: SURGERY

## 2022-07-12 NOTE — PROGRESS NOTES
[facility]  [facility]. [address] [place] [address]  [phone]       Patient:  Darin Cristobal  :     HPI  Darin Cristobal is a 52 y.o. female who presents with peritoneal carcinomatosis. The patient states that in January she began to develop right flank pain and was having difficulty having bowel movements. She was concerned that she had a kidney stone and had a CT scan performed. The CT demonstrated a linear calcific density along the course of the proximal left ureter with associated hydronephrosis. There was also stranding throughout the retroperitoneal soft tissues anterior to the left psoas muscle with pelvic ascites. The patient then underwent a diagnostic laparoscopy with Dr. Muriel Herman on 2022 for concern for possible pelvic malignancy. At the time of the diagnostic laparoscopy the patient was found to have scattered carcinomatosis involving the right diaphragm, left diaphragm and peritoneal cavity. Her left ovary was slightly enlarged versus right, but not clearly malignant. Both tubes and the uterus were surgically absent. There was no evidence of bowel obstruction. She had what appeared to be omental disease at the greater curvature of her stomach. 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 The patient then underwent a laparotomy with bilateral salpingo-oophorectomy and cystoscopy with bilateral double-J ureteral stents placed on 2/15/2022 by Dr. Mureil Hermna. At this time the patient was found to have disease as previously seen as well as on the right colon externally at least. The pelvis was essentially frozen with significant induration and adhesion in the sigmoid area rising out of the pelvis, most consistent with a primary in that location.   She appeared to have bilateral fibrosis of the peritoneum extending into the retroperitoneal space. Both adnexae were removed but were not obvious primary sources for her disease. Final pathology of the ovaries revealed focus of metastatic poorly differentiated carcinoma with focal signet ring features involving one ovary. She was then started on chemotherapy of FOLFOX, Oxaliplatin, and Avastin. She has had 8 cycles of chemotherapy. The patient has had her ureteral stents replaced multiple times as well. At this time, the patient states that she is not having any abdominal pain. She is tolerating a regular diet. She his having regular BMs. She is not having any difficulties with urination. She states that she has had about a 25lb weight loss since February. The patients most recent CT scan demonstrates increased indeterminant attenuating fluid within the dependent pelvis and space of retzius with additional mesentery/peritoneal nodularity and stranding concerning for carcinomatosis. There was increase conspicuously of a 7mm right pelvic sidewall lymph node as well. The patient does have a past medical history of iron deficient anemia for which she has had a Hysterectomy for. She has also had a gastric sleeve in the past. She states that she was found to have a PE and is on Xarelto as well.            Past Medical History:   Diagnosis Date    Anemia     2012-- not a problem since hyst    Colon cancer ([address] dx 2/2022     plans for chemo--- followed by dr Isaura Latham [phone] 12/2020    no hospitalization    GERD (gastroesophageal reflux disease)     managed with med    History of colonoscopy 05/2018    Dr. Jose Jin, nl (see media note), R 2028    Hx of blood clots 06/2022    per pt \"small clot on lung identified by CT scan\"  CT Scan impression:- Nonobstructive pulmonary filling defect involving Left Lower Lobe     Hypotension     asymptomatic    Obstruction of fallopian tube     per pt has \"1 good tube\"    Peritoneal carcinomatosis ([address]     Weight loss     80lbs weight loss after gastric sleeve     Current Outpatient Medications   Medication Sig Dispense Refill    rivaroxaban 15 & 20 MG Starter Pack Take as directed on package. 1 each 0    potassium chloride (KLOR-CON M) 20 MEQ extended release tablet Take 1 tablet by mouth 2 times daily 60 tablet 1    ergocalciferol (ERGOCALCIFEROL) 1.25 MG (49508 UT) capsule Take 50,000 Units by mouth every 7 days Takes on Thursday      lidocaine-prilocaine (EMLA) 2.5-2.5 % cream Apply to port about 45 minutes prior to access      metoclopramide (REGLAN) 10 MG tablet Take 10 mg by mouth 4 times daily (before meals and nightly)      ondansetron (ZOFRAN) 8 MG tablet Take 8 mg by mouth every 8 hours as needed  (Patient not taking: Reported on 2022)      pantoprazole (PROTONIX) 40 MG tablet 2 times daily      valACYclovir (VALTREX) 500 MG tablet Take 500 mg by mouth daily       No current facility-administered medications for this visit.      No Known Allergies  Past Surgical History:   Procedure Laterality Date     SECTION      CYSTOSCOPY Bilateral 2022    CYSTOSCOPY BILATERAL RETROGRADE PYELOGRAM performed by Brea Palacios MD at [address] Bilateral 2022    BILATERAL CYSTOSCOPY URETERAL STENT EXCHANGE performed by Brea Palacios MD at [address]  2014    gastric sleeve- Choudhari    HYSTERECTOMY (CERVIX STATUS UNKNOWN)      2018    HYSTERECTOMY (CERVIX STATUS UNKNOWN)  2020    TLH w/ Bilateral salpingectomy and left oophorectomy    IR PORT PLACEMENT EQUAL OR GREATER THAN 5 YEARS  2022    IR PORT PLACEMENT EQUAL OR GREATER THAN 5 YEARS  2022    IR PORT PLACEMENT EQUAL OR GREATER THAN 5 YEARS 2022 [facility]    MYOMECTOMY  age Lbake Dye 21s\"    also \"unblocked her FT\"    TONSILLECTOMY      UPPER GASTROINTESTINAL ENDOSCOPY      with dilation    UROLOGICAL SURGERY Bilateral 03/15/2022    cysto     Family History   Problem Relation Age of Onset    Heart Disease Maternal Grandmother     Hypertension Maternal Grandmother     Heart Disease Maternal Grandfather     Hypertension Maternal Grandfather     Pulmonary Embolism Neg Hx     Colon Cancer Neg Hx     Deep Vein Thrombosis Neg Hx     Ovarian Cancer Neg Hx     Prostate Cancer Neg Hx     Breast Cancer Neg Hx      Social History     Tobacco Use    Smoking status: Never Smoker    Smokeless tobacco: Never Used   Substance Use Topics    Alcohol use: Not Currently        Review of Systems   A comprehensive review of systems was negative except for that written in the HPI.      Physical Exam  /80   Pulse 78   Ht 5' 4\" (1.626 m)   Wt 113 lb (51.3 kg)   SpO2 98%   BMI 19.40 kg/m²      General: Alert, oriented, cooperative, awake patient in no acute distress   Skin:  Warm, moist with good texture   Eyes:   Sclera are clear, extraocular muscles intact  HENT:  Normocephalic; oral mucosa moist, nares patent; neck is supple; trachea midline  Respiratory: Lungs clear to auscultation bilaterally, breathing is non-labored   Chest:  Symmetric throughout one respiratory excursion; no supraclavicular lymphadenopathy  CV:  Regular rate and rhythm, no appreciable murmurs, rubs, gallops  Abdomen: Soft, protuberant but non-distended; bowel sounds are normoactive   Extremities: No cyanosis, clubbing or edema  Neurological: No focal signs      Labs:   Lab Results   Component Value Date    WBC 5.5 07/07/2022    HGB 11.7 07/07/2022    HCT 37.5 07/07/2022    MCV 95.4 07/07/2022     (L) 07/07/2022     Lab Results   Component Value Date     07/07/2022    K 4.4 07/07/2022     07/07/2022    CO2 28 07/07/2022    BUN 10 07/07/2022    CREATININE 0.70 07/07/2022    GLUCOSE 99 07/07/2022    CALCIUM 9.5 07/07/2022    PROT 7.5 07/07/2022    LABALBU 3.6 07/07/2022    BILITOT 0.3 07/07/2022    ALKPHOS 69 07/07/2022    AST 21 07/07/2022    ALT 28 07/07/2022    LABGLOM >60 07/07/2022    GFRAA >60 No main ductal dilatation. Spleen: Similar subcentimeter hypodensities within the posterior medial and  anterior hilar aspect of the spleen statistically favoring benign process. Small  infrarenal round splenule. Adrenal glands: Unremarkable. Kidneys/Ureters: Bilateral urothelial stents coiled within the renal pelvis  disease and bladder with improved hydroureteronephrosis, and improved  enhancement of the left renal parenchyma. Bilateral mild pelviectasis present. No suspicious lesion. Bladder: Diffuse thickening of the partially distended bladder. Reproduction:Postsurgical changes status post hysterectomy with bilateral  salpingo-oophorectomies. Bowel: Postsurgical changes related to prior gastric sleeve. No obstructing mass  or bowel wall thickening. Mesentery/Retroperitoneum/Peritoneum: Increase indeterminate attenuating fluid  measuring up to 24 Hounsfield units centered within the dependent pelvis and  space of Retzius. Additional areas of scattered Mesentery/peroneal stranding and  nodularity that is annotated with arrows on axial series 2. Persistent yet  diminished soft tissue thickening overlying the anterior lower abdominal wall  and adjacent soft tissues. Increase conspicuously of a 7 mm right pelvic  sidewall lymph node (axial image number 98)    Vasculature: Abdominal aorta is normal caliber. Musculoskeletal: No acute or aggressive osseous abnormalities. Impression  1. Nonobstructive pulmonary filling defect involving the subsegmental vessels  of the left lower lobe. 2.  Postsurgical changes from post hysterectomy and bilateral  salpingo-oophorectomies with increased indeterminant attenuating fluid within  the dependent pelvis and Space of Retzius with additional mesentery/peritoneal  nodularity and stranding concerning for carcinomatosis. 3.  Increase conspicuously of a 7 mm right pelvic sidewall lymph node.     4.  Bilateral ureteral stents in place with improved hydroureteronephrosis and  enhancement of the left renal parenchyma. 5.  Persistent diffuse thickening of partially distended bladder which may  represent post treatment changes. 6.  No evidence of metastatic disease to the chest.    [facility] Critical Result: Appropriate actions should be taken. Findings were submitted as critical result to ordering provider at 1:07 PM on  June 22, 2022 by radiologist Dr. Humberto Corbin MD.      PET:  PET/CT         Indication: Poorly differentiated ovarian adenocarcinoma       Radiopharmaceutical: 15.05 mCi F18-FDG, intravenously. Technique: Imaging was performed from the skull through the proximal thighs   using routine PET/CT acquisition protocol. Imaging was performed approximately   60 minutes post injection. Oral contrast was administered. Radiation dose   reduction techniques were used for this study:  Our CT scanners use one or all   of the following: Automated exposure control, adjustment of the mA and/or kVp   according to patient's size, iterative reconstruction. Serum glucose: 74 mg/dL prior to injection. Comparison studies: CT abdomen pelvis without contrast 1/27/2022, CT abdomen   pelvis with contrast 1/23/2022       Findings:       Head and Neck: No enlarged or hypermetabolic lymph nodes within the neck. No   worrisome focal FDG uptake in the neck soft tissues. Chest: No lymphadenopathy. No discrete lung nodule. Abdomen/Pelvis: Focal FDG uptake in the left pelvis (axial fused images series   1202, image 230), maximum SUV 12.3. More inferiorly in the left pelvis are 2   foci of elevated tracer uptake which is thought to reflect activity from   excreted urine within the bladder. Small volume free fluid settling dependently   in the pelvis, which is mildly hypermetabolic, maximum SUV 2.5. Moderate right hydroureteronephrosis. Postsurgical changes from apparent gastric sleeve surgery.        Elevated uptake in the right out of the pelvis, most consistent with a primary in that location. She appeared to have bilateral fibrosis of the peritoneum extending into the retroperitoneal space. Both adnexae were removed but were not obvious primary sources for her disease. * * *DIAGNOSIS* * * * * * * * * * * * * * * * * * * * * * * * * * * * * * *            A:  \"TROCAR SITE\":             METASTATIC CARCINOMA. B:  \"ABDOMINAL WALL IMPLANT\":             METASTATIC CARCINOMA. INCIDENTAL FOCI OF MATURE OSSEOUS METAPLASIA. C: \"BILATERAL OVARIES\":             FOCUS OF METASTATIC POORLY DIFFERENTIATED CARCINOMA WITH FOCAL                  SIGNET RING FEATURES INVOLVING ONE OVARY. SEE IMMUNOHISTOCHEMISTRY REPORT. Assessment/Plan:  Millie Garcia is a 52 y.o. female who has signs and symptoms consistent with peritoneal carcinomatosis without identifiable primary source. It was discussed with the patient that she does have a very concerning process and there is no certainty that it can be treated with tumor debulking and HIPEC. It was discussed the concerns are that she will have a frozen pelvis and ureteral invasion that is unable to be debulked. However, this would be unable to be determined without first exploring her abdomen. If it is found that she has a frozen pelvis and is unable to be debulked the procedure would then be an open and close procedure without HIPEC. If her tumor is able to be debulked, there is also the concern of the unknown primary tumor. If it is from an upper GI source as suggested from the pathology of her initial diagnostic laparoscopy, then the she may not receive much long-term benefit from the operation as well. The patient and her  wish to be aggressive with her treatment at this time and proceed with attempting tumor debulking and HIPEC.  She will need to be off of her chemotherapy for 4 weeks prior to her operation, and she will need

## 2022-07-15 ENCOUNTER — TELEPHONE (OUTPATIENT)
Dept: ONCOLOGY | Age: 47
End: 2022-07-15

## 2022-07-15 NOTE — TELEPHONE ENCOUNTER
Called pt for FU  Pt still needs to be seen by Dr. Hammad Hassan for bleeding r/t stent exchange. Inbasket ms sent to  Aidan Gaviria to add apt back to schedule. Pt notified that she will still need to be seen as previously scheduled. Pt verbalized understanding.

## 2022-07-18 ENCOUNTER — OFFICE VISIT (OUTPATIENT)
Dept: ONCOLOGY | Age: 47
End: 2022-07-18
Payer: COMMERCIAL

## 2022-07-18 ENCOUNTER — HOSPITAL ENCOUNTER (OUTPATIENT)
Dept: LAB | Age: 47
Discharge: HOME | End: 2022-07-21
Payer: COMMERCIAL

## 2022-07-18 VITALS
HEART RATE: 84 BPM | WEIGHT: 113 LBS | OXYGEN SATURATION: 99 % | RESPIRATION RATE: 18 BRPM | TEMPERATURE: 98 F | BODY MASS INDEX: 19.29 KG/M2 | HEIGHT: 64 IN | SYSTOLIC BLOOD PRESSURE: 145 MMHG | DIASTOLIC BLOOD PRESSURE: 102 MMHG

## 2022-07-18 DIAGNOSIS — C78.6 METASTASIS TO PERITONEUM OF UNKNOWN PRIMARY (HCC): ICD-10-CM

## 2022-07-18 DIAGNOSIS — R31.9 HEMATURIA, UNSPECIFIED TYPE: Primary | ICD-10-CM

## 2022-07-18 DIAGNOSIS — N13.30 HYDRONEPHROSIS, UNSPECIFIED HYDRONEPHROSIS TYPE: ICD-10-CM

## 2022-07-18 DIAGNOSIS — C80.1 CARCINOMA OF UNKNOWN PRIMARY (HCC): Primary | ICD-10-CM

## 2022-07-18 DIAGNOSIS — R31.9 HEMATURIA, UNSPECIFIED TYPE: ICD-10-CM

## 2022-07-18 DIAGNOSIS — C80.1 METASTASIS TO PERITONEUM OF UNKNOWN PRIMARY (HCC): ICD-10-CM

## 2022-07-18 LAB
APPEARANCE UR: ABNORMAL
BACTERIA URNS QL MICRO: ABNORMAL /HPF
BILIRUB UR QL: ABNORMAL
CASTS URNS QL MICRO: ABNORMAL /LPF
COLOR UR: ABNORMAL
CRYSTALS URNS QL MICRO: 0 /LPF
EPI CELLS #/AREA URNS HPF: ABNORMAL /HPF
GLUCOSE UR STRIP.AUTO-MCNC: NEGATIVE MG/DL
HGB UR QL STRIP: ABNORMAL
KETONES UR QL STRIP.AUTO: ABNORMAL MG/DL
LEUKOCYTE ESTERASE UR QL STRIP.AUTO: ABNORMAL
MUCOUS THREADS URNS QL MICRO: ABNORMAL /LPF
NITRITE UR QL STRIP.AUTO: NEGATIVE
OTHER OBSERVATIONS: ABNORMAL
PH UR STRIP: 5.5 [PH] (ref 5–9)
PROT UR STRIP-MCNC: ABNORMAL MG/DL
RBC #/AREA URNS HPF: >100 /HPF
SP GR UR REFRACTOMETRY: 1.02 (ref 1–1.02)
UROBILINOGEN UR QL STRIP.AUTO: 1 EU/DL (ref 0.2–1)
WBC URNS QL MICRO: ABNORMAL /HPF

## 2022-07-18 PROCEDURE — 87086 URINE CULTURE/COLONY COUNT: CPT

## 2022-07-18 PROCEDURE — 99213 OFFICE O/P EST LOW 20 MIN: CPT | Performed by: UROLOGY

## 2022-07-18 PROCEDURE — 81003 URINALYSIS AUTO W/O SCOPE: CPT

## 2022-07-18 PROCEDURE — 81001 URINALYSIS AUTO W/SCOPE: CPT

## 2022-07-18 ASSESSMENT — PATIENT HEALTH QUESTIONNAIRE - PHQ9
7. TROUBLE CONCENTRATING ON THINGS, SUCH AS READING THE NEWSPAPER OR WATCHING TELEVISION: 0
8. MOVING OR SPEAKING SO SLOWLY THAT OTHER PEOPLE COULD HAVE NOTICED. OR THE OPPOSITE, BEING SO FIGETY OR RESTLESS THAT YOU HAVE BEEN MOVING AROUND A LOT MORE THAN USUAL: 0
SUM OF ALL RESPONSES TO PHQ9 QUESTIONS 1 & 2: 0
9. THOUGHTS THAT YOU WOULD BE BETTER OFF DEAD, OR OF HURTING YOURSELF: 0
10. IF YOU CHECKED OFF ANY PROBLEMS, HOW DIFFICULT HAVE THESE PROBLEMS MADE IT FOR YOU TO DO YOUR WORK, TAKE CARE OF THINGS AT HOME, OR GET ALONG WITH OTHER PEOPLE: 0
2. FEELING DOWN, DEPRESSED OR HOPELESS: 0
SUM OF ALL RESPONSES TO PHQ QUESTIONS 1-9: 0
SUM OF ALL RESPONSES TO PHQ QUESTIONS 1-9: 0
5. POOR APPETITE OR OVEREATING: 0
3. TROUBLE FALLING OR STAYING ASLEEP: 0
6. FEELING BAD ABOUT YOURSELF - OR THAT YOU ARE A FAILURE OR HAVE LET YOURSELF OR YOUR FAMILY DOWN: 0
SUM OF ALL RESPONSES TO PHQ QUESTIONS 1-9: 0
4. FEELING TIRED OR HAVING LITTLE ENERGY: 0
1. LITTLE INTEREST OR PLEASURE IN DOING THINGS: 0
SUM OF ALL RESPONSES TO PHQ QUESTIONS 1-9: 0

## 2022-07-18 ASSESSMENT — ENCOUNTER SYMPTOMS
GASTROINTESTINAL NEGATIVE: 1
RESPIRATORY NEGATIVE: 1

## 2022-07-18 NOTE — PATIENT INSTRUCTIONS
Patient Instructions from Today's Visit    Reason for Visit:  Follow up - Peritoneal Carcinomatosis     Diagnosis Information:  [email or web address]    Plan: Will check urinalysis and culture today. You can may continue to have blood in the urine every now and then. Please call us if you have any questions or concerns before your next visit. Follow Up: Follow up as scheduled.      Recent Lab Results:  Hospital Outpatient Visit on 07/18/2022   Component Date Value Ref Range Status    Color, UA 07/18/2022 RED    Final    Appearance 07/18/2022 TURBID    Final    Specific Gravity, UA 07/18/2022 1.025 (A) 1.001 - 1.023   Final    pH, Urine 07/18/2022 5.5  5.0 - 9.0   Final    Protein, UA 07/18/2022 >=300 mg/dL  NEG mg/dL Final    Glucose, UA 07/18/2022 Negative  NEG mg/dL Final    Ketones, Urine 07/18/2022 TRACE (A) NEG mg/dL Final    Bilirubin Urine 07/18/2022 SMALL (A) NEG   Final    Positive, unable to confirm with Ictotest.    Blood, Urine 07/18/2022 LARGE (A) NEG   Final    Urobilinogen, Urine 07/18/2022 1.0  0.2 - 1.0 EU/dL Final    Nitrite, Urine 07/18/2022 Negative  NEG   Final    Leukocyte Esterase, Urine 07/18/2022 TRACE (A) NEG   Final    WBC, UA 07/18/2022 0-3  0 /hpf Final    RBC, UA 07/18/2022 >100  0 /hpf Final    Epithelial Cells UA 07/18/2022 0-3  0 /hpf Final    BACTERIA, URINE 07/18/2022 2+ (A) 0 /hpf Final    Casts 07/18/2022 0-3  0 /lpf Final    RED CELL CAST    Crystals 07/18/2022 0  0 /LPF Final    Mucus, UA 07/18/2022 1+ (A) 0 /lpf Final    OTHER OBSERVATIONS 07/18/2022 PERFORMED ON UNSPUN SAMPLE   Final         Treatment Summary has been discussed and given to patient: N/A        -------------------------------------------------------------------------------------------------------------------  Please call our office at [phone] if you have any  of the following symptoms:   Fever of 100.5 or greater  Chills  Shortness of breath  Swelling or pain in one leg    After office hours an answering service is available and will contact a provider for emergencies or if you are experiencing any of the above symptoms. Patient does express an interest in My Chart. My Chart log in information explained on the after visit summary printout at the [facility] desk.     Maryjane Vargas RN

## 2022-07-18 NOTE — PROGRESS NOTES
Negative. Genitourinary: Negative. Negative for difficulty urinating, dysuria, flank pain, frequency, hematuria and urgency. Musculoskeletal: Negative. All other systems reviewed and are negative. PHYSICAL EXAMINATION    BP (!) 145/102 Comment: standing  Pulse 84   Temp 98 °F (36.7 °C)   Resp 18   Ht 5' 4\" (1.626 m)   Wt 113 lb (51.3 kg)   SpO2 99%   BMI 19.40 kg/m²   General: well dressed, well nourished, no acute distress  Skin: no rashes  HEENT: Sclera are clear,normocephalic, atraumatic. no external lesions   Cardiovascular: Reg. Normal perfusion  Respiratory: normal respiratory effort, no JVD, no audible wheezing. Musculoskeletal: unremarkable with normal function. No embolic signs or cyanosis. Neurologic exam: intact, no focal deficits, moves all 4 extremities  Psych: normal mood and affect, alert, oriented x 3  LE:  no edema  GI: soft, nontender, no masses, no CVA tenderness  : DEFERRED       LABORATORY RESULTS:        IMAGING:    XR Results:    No results found for this or any previous visit. CT Results:    === 06/22/22 ===    CT CHEST ABDOMEN PELVIS W CONTRAST    - Narrative -  EXAMINATION: CT CHEST ABDOMEN PELVIS W CONTRAST 6/22/2022 12:15 PM    ACCESSION NUMBER: [id number]    COMPARISON: CT abdomen pelvis March 2, 2022. PET/CT February 8, 2022    INDICATION: History of poorly differentiated ovarian adenocarcinoma. Restaging    TECHNIQUE: Multiple contiguous axial CT images of the chest, abdomen, and pelvis  were obtained from the lung apices to the symphysis pubis after the intravenous  administration of 100 mL Isovue 370. Reformats are provided. Radiation dose reduction techniques were used for this study. Our CT scanners  use one or all of the following: Automated exposure control, adjustment of the  mA and/or kV according to patient size, iterative reconstruction. FINDINGS:  CHEST:  Base of Neck/Chest Wall: Unremarkable.     Mediastinum: No enlarged mediastinal or areas of scattered Mesentery/peroneal stranding and  nodularity that is annotated with arrows on axial series 2. Persistent yet  diminished soft tissue thickening overlying the anterior lower abdominal wall  and adjacent soft tissues. Increase conspicuously of a 7 mm right pelvic  sidewall lymph node (axial image number 98)    Vasculature: Abdominal aorta is normal caliber. Musculoskeletal: No acute or aggressive osseous abnormalities. - Impression -  1. Nonobstructive pulmonary filling defect involving the subsegmental vessels  of the left lower lobe. 2.  Postsurgical changes from post hysterectomy and bilateral  salpingo-oophorectomies with increased indeterminant attenuating fluid within  the dependent pelvis and Space of Retzius with additional mesentery/peritoneal  nodularity and stranding concerning for carcinomatosis. 3.  Increase conspicuously of a 7 mm right pelvic sidewall lymph node. 4.  Bilateral ureteral stents in place with improved hydroureteronephrosis and  enhancement of the left renal parenchyma. 5.  Persistent diffuse thickening of partially distended bladder which may  represent post treatment changes. 6.  No evidence of metastatic disease to the chest.    [facility] Critical Result: Appropriate actions should be taken. Findings were submitted as critical result to ordering provider at 1:07 PM on  June 22, 2022 by radiologist Dr. Claudio Garcia MD.      MRI Results:    No results found for this or any previous visit. ASSESSMENT:    Ms. Gabe Perez is a 52 y.o. female with a diagnosis of metastatic carcinoma of unknown primary. She is scheduled for Hypaque in approximately 1 week. I recommended we check a urinalysis and urine culture today. If the culture grows something we will treated appropriately. I reassured her that I think the small amount of blood she is having in her ureter is from bladder irritation caused by the stents.   We will continue to monitor this.  She has a follow-up with me in November for discussion of her next stent exchange. PLAN:     -UA and culture today  -RTC in Nov as scheduled to arrange bilateral stent exchange  ________________________________________      I have seen and examined this patient. I have reviewed and edited the note started by the MA and agree with the outlined plan. Part of this note was written by using a voice dictation software. The note has been proof read but may still contain some grammatical/other typographical errors.       [facility]

## 2022-07-20 LAB
BACTERIA SPEC CULT: NORMAL
BACTERIA SPEC CULT: NORMAL
SERVICE CMNT-IMP: NORMAL

## 2022-07-21 ENCOUNTER — HOSPITAL ENCOUNTER (OUTPATIENT)
Dept: LAB | Age: 47
Discharge: HOME | End: 2022-07-24
Payer: COMMERCIAL

## 2022-07-21 DIAGNOSIS — C80.1 CARCINOMA OF UNKNOWN PRIMARY (HCC): ICD-10-CM

## 2022-07-21 DIAGNOSIS — D50.0 IRON DEFICIENCY ANEMIA DUE TO CHRONIC BLOOD LOSS: ICD-10-CM

## 2022-07-21 LAB
ALBUMIN SERPL-MCNC: 3.5 G/DL (ref 3.5–5)
ALBUMIN/GLOB SERPL: 1 {RATIO} (ref 1.2–3.5)
ALP SERPL-CCNC: 62 U/L (ref 50–136)
ALT SERPL-CCNC: 18 U/L (ref 12–65)
ANION GAP SERPL CALC-SCNC: 4 MMOL/L (ref 7–16)
APPEARANCE UR: ABNORMAL
AST SERPL-CCNC: 17 U/L (ref 15–37)
BACTERIA URNS QL MICRO: ABNORMAL /HPF
BASOPHILS # BLD: 0 K/UL (ref 0–0.2)
BASOPHILS NFR BLD: 0 % (ref 0–2)
BILIRUB SERPL-MCNC: 0.5 MG/DL (ref 0.2–1.1)
BILIRUB UR QL: NEGATIVE
BUN SERPL-MCNC: 7 MG/DL (ref 6–23)
CALCIUM SERPL-MCNC: 9.2 MG/DL (ref 8.3–10.4)
CASTS URNS QL MICRO: 0 /LPF
CHLORIDE SERPL-SCNC: 106 MMOL/L (ref 98–107)
CO2 SERPL-SCNC: 30 MMOL/L (ref 21–32)
COLOR UR: ABNORMAL
CREAT SERPL-MCNC: 0.6 MG/DL (ref 0.6–1)
CRYSTALS URNS QL MICRO: 0 /LPF
DIFFERENTIAL METHOD BLD: ABNORMAL
EOSINOPHIL # BLD: 0.1 K/UL (ref 0–0.8)
EOSINOPHIL NFR BLD: 2 % (ref 0.5–7.8)
EPI CELLS #/AREA URNS HPF: ABNORMAL /HPF
ERYTHROCYTE [DISTWIDTH] IN BLOOD BY AUTOMATED COUNT: 17.2 % (ref 11.9–14.6)
FERRITIN SERPL-MCNC: 76 NG/ML (ref 8–388)
GLOBULIN SER CALC-MCNC: 3.4 G/DL (ref 2.3–3.5)
GLUCOSE SERPL-MCNC: 84 MG/DL (ref 65–100)
GLUCOSE UR STRIP.AUTO-MCNC: 100 MG/DL
HCT VFR BLD AUTO: 31.6 % (ref 35.8–46.3)
HGB BLD-MCNC: 9.8 G/DL (ref 11.7–15.4)
HGB UR QL STRIP: ABNORMAL
IMM GRANULOCYTES # BLD AUTO: 0 K/UL (ref 0–0.5)
IMM GRANULOCYTES NFR BLD AUTO: 0 % (ref 0–5)
IRON SATN MFR SERPL: 24 %
IRON SERPL-MCNC: 92 UG/DL (ref 35–150)
KETONES UR QL STRIP.AUTO: NEGATIVE MG/DL
LEUKOCYTE ESTERASE UR QL STRIP.AUTO: ABNORMAL
LYMPHOCYTES # BLD: 1.7 K/UL (ref 0.5–4.6)
LYMPHOCYTES NFR BLD: 33 % (ref 13–44)
MAGNESIUM SERPL-MCNC: 1.8 MG/DL (ref 1.8–2.4)
MCH RBC QN AUTO: 30.3 PG (ref 26.1–32.9)
MCHC RBC AUTO-ENTMCNC: 31 G/DL (ref 31.4–35)
MCV RBC AUTO: 97.8 FL (ref 79.6–97.8)
MONOCYTES # BLD: 1 K/UL (ref 0.1–1.3)
MONOCYTES NFR BLD: 20 % (ref 4–12)
MUCOUS THREADS URNS QL MICRO: ABNORMAL /LPF
NEUTS SEG # BLD: 2.3 K/UL (ref 1.7–8.2)
NEUTS SEG NFR BLD: 45 % (ref 43–78)
NITRITE UR QL STRIP.AUTO: NEGATIVE
NRBC # BLD: 0 K/UL (ref 0–0.2)
PH UR STRIP: 5.5 [PH] (ref 5–9)
PLATELET # BLD AUTO: 131 K/UL (ref 150–450)
PMV BLD AUTO: 9.8 FL (ref 9.4–12.3)
POTASSIUM SERPL-SCNC: 4 MMOL/L (ref 3.5–5.1)
PROT SERPL-MCNC: 6.9 G/DL (ref 6.3–8.2)
PROT UR STRIP-MCNC: 100 MG/DL
RBC # BLD AUTO: 3.23 M/UL (ref 4.05–5.2)
RBC #/AREA URNS HPF: >100 /HPF
SODIUM SERPL-SCNC: 140 MMOL/L (ref 136–145)
SP GR UR REFRACTOMETRY: 1.02 (ref 1–1.02)
TIBC SERPL-MCNC: 391 UG/DL (ref 250–450)
UROBILINOGEN UR QL STRIP.AUTO: 0.2 EU/DL (ref 0.2–1)
WBC # BLD AUTO: 5.1 K/UL (ref 4.3–11.1)
WBC URNS QL MICRO: ABNORMAL /HPF

## 2022-07-21 PROCEDURE — 83735 ASSAY OF MAGNESIUM: CPT

## 2022-07-21 PROCEDURE — 82728 ASSAY OF FERRITIN: CPT

## 2022-07-21 PROCEDURE — 80053 COMPREHEN METABOLIC PANEL: CPT

## 2022-07-21 PROCEDURE — 83540 ASSAY OF IRON: CPT

## 2022-07-21 PROCEDURE — 85025 COMPLETE CBC W/AUTO DIFF WBC: CPT

## 2022-07-21 PROCEDURE — 81001 URINALYSIS AUTO W/SCOPE: CPT

## 2022-07-21 PROCEDURE — 36415 COLL VENOUS BLD VENIPUNCTURE: CPT

## 2022-07-21 PROCEDURE — 81003 URINALYSIS AUTO W/O SCOPE: CPT

## 2022-08-03 ENCOUNTER — PREP FOR PROCEDURE (OUTPATIENT)
Dept: SURGERY | Age: 47
End: 2022-08-03

## 2022-08-03 DIAGNOSIS — Z01.818 PRE-OP TESTING: Primary | ICD-10-CM

## 2022-08-03 RX ORDER — SODIUM CHLORIDE 9 MG/ML
INJECTION, SOLUTION INTRAVENOUS PRN
Status: CANCELLED | OUTPATIENT
Start: 2022-08-03

## 2022-08-03 RX ORDER — SODIUM CHLORIDE 0.9 % (FLUSH) 0.9 %
5-40 SYRINGE (ML) INJECTION PRN
Status: CANCELLED | OUTPATIENT
Start: 2022-08-03

## 2022-08-03 RX ORDER — SODIUM CHLORIDE 0.9 % (FLUSH) 0.9 %
5-40 SYRINGE (ML) INJECTION EVERY 12 HOURS SCHEDULED
Status: CANCELLED | OUTPATIENT
Start: 2022-08-03

## 2022-08-09 RX ORDER — MITOMYCIN 5 MG/10ML
30 INJECTION, POWDER, LYOPHILIZED, FOR SOLUTION INTRAVENOUS
Status: CANCELLED | OUTPATIENT
Start: 2022-08-17 | End: 2022-08-17

## 2022-08-09 RX ORDER — MITOMYCIN 5 MG/10ML
10 INJECTION, POWDER, LYOPHILIZED, FOR SOLUTION INTRAVENOUS
Status: CANCELLED | OUTPATIENT
Start: 2022-08-17 | End: 2022-08-17

## 2022-08-10 ENCOUNTER — HOSPITAL ENCOUNTER (OUTPATIENT)
Dept: GENERAL RADIOLOGY | Age: 47
Discharge: HOME | End: 2022-08-13
Payer: COMMERCIAL

## 2022-08-10 ENCOUNTER — HOSPITAL ENCOUNTER (OUTPATIENT)
Dept: PREADMISSION TESTING | Age: 47
Discharge: HOME | End: 2022-08-13
Payer: COMMERCIAL

## 2022-08-10 VITALS
HEIGHT: 66 IN | TEMPERATURE: 97.7 F | RESPIRATION RATE: 16 BRPM | WEIGHT: 114.6 LBS | OXYGEN SATURATION: 100 % | DIASTOLIC BLOOD PRESSURE: 86 MMHG | BODY MASS INDEX: 18.42 KG/M2 | SYSTOLIC BLOOD PRESSURE: 131 MMHG | HEART RATE: 76 BPM

## 2022-08-10 DIAGNOSIS — Z01.818 PRE-OP TESTING: ICD-10-CM

## 2022-08-10 LAB
ALBUMIN SERPL-MCNC: 3.4 G/DL (ref 3.5–5)
ALBUMIN/GLOB SERPL: 0.9 {RATIO} (ref 1.2–3.5)
ALP SERPL-CCNC: 75 U/L (ref 50–136)
ALT SERPL-CCNC: 21 U/L (ref 12–65)
ANION GAP SERPL CALC-SCNC: 4 MMOL/L (ref 7–16)
APPEARANCE UR: ABNORMAL
APTT PPP: 23 SEC (ref 24.1–35.1)
AST SERPL-CCNC: 25 U/L (ref 15–37)
BACTERIA URNS QL MICRO: 0 /HPF
BASOPHILS # BLD: 0 K/UL (ref 0–0.2)
BASOPHILS NFR BLD: 0 % (ref 0–2)
BILIRUB SERPL-MCNC: 0.4 MG/DL (ref 0.2–1.1)
BILIRUB UR QL: NEGATIVE
BUN SERPL-MCNC: 9 MG/DL (ref 6–23)
CALCIUM SERPL-MCNC: 9.2 MG/DL (ref 8.3–10.4)
CHLORIDE SERPL-SCNC: 103 MMOL/L (ref 98–107)
CO2 SERPL-SCNC: 31 MMOL/L (ref 21–32)
COLOR UR: ABNORMAL
CREAT SERPL-MCNC: 0.52 MG/DL (ref 0.6–1)
DIFFERENTIAL METHOD BLD: ABNORMAL
EKG ATRIAL RATE: 74 BPM
EKG DIAGNOSIS: NORMAL
EKG P AXIS: 76 DEGREES
EKG P-R INTERVAL: 132 MS
EKG Q-T INTERVAL: 388 MS
EKG QRS DURATION: 70 MS
EKG QTC CALCULATION (BAZETT): 430 MS
EKG R AXIS: 47 DEGREES
EKG T AXIS: 50 DEGREES
EKG VENTRICULAR RATE: 74 BPM
EOSINOPHIL # BLD: 0.1 K/UL (ref 0–0.8)
EOSINOPHIL NFR BLD: 2 % (ref 0.5–7.8)
EPI CELLS #/AREA URNS HPF: ABNORMAL /HPF
ERYTHROCYTE [DISTWIDTH] IN BLOOD BY AUTOMATED COUNT: 14 % (ref 11.9–14.6)
GLOBULIN SER CALC-MCNC: 3.7 G/DL (ref 2.3–3.5)
GLUCOSE SERPL-MCNC: 101 MG/DL (ref 65–100)
GLUCOSE UR STRIP.AUTO-MCNC: NEGATIVE MG/DL
HCT VFR BLD AUTO: 37.1 % (ref 35.8–46.3)
HGB BLD-MCNC: 11.2 G/DL (ref 11.7–15.4)
HGB UR QL STRIP: ABNORMAL
IMM GRANULOCYTES # BLD AUTO: 0 K/UL (ref 0–0.5)
IMM GRANULOCYTES NFR BLD AUTO: 0 % (ref 0–5)
INR PPP: 1.2
KETONES UR QL STRIP.AUTO: NEGATIVE MG/DL
LEUKOCYTE ESTERASE UR QL STRIP.AUTO: ABNORMAL
LYMPHOCYTES # BLD: 1.1 K/UL (ref 0.5–4.6)
LYMPHOCYTES NFR BLD: 17 % (ref 13–44)
MCH RBC QN AUTO: 29.4 PG (ref 26.1–32.9)
MCHC RBC AUTO-ENTMCNC: 30.2 G/DL (ref 31.4–35)
MCV RBC AUTO: 97.4 FL (ref 79.6–97.8)
MONOCYTES # BLD: 0.6 K/UL (ref 0.1–1.3)
MONOCYTES NFR BLD: 9 % (ref 4–12)
MUCOUS THREADS URNS QL MICRO: ABNORMAL /LPF
NEUTS SEG # BLD: 4.7 K/UL (ref 1.7–8.2)
NEUTS SEG NFR BLD: 72 % (ref 43–78)
NITRITE UR QL STRIP.AUTO: NEGATIVE
NRBC # BLD: 0 K/UL (ref 0–0.2)
OTHER OBSERVATIONS: ABNORMAL
PH UR STRIP: 5 [PH] (ref 5–9)
PLATELET # BLD AUTO: 206 K/UL (ref 150–450)
PMV BLD AUTO: 10.6 FL (ref 9.4–12.3)
POTASSIUM SERPL-SCNC: 3.9 MMOL/L (ref 3.5–5.1)
PROT SERPL-MCNC: 7.1 G/DL (ref 6.3–8.2)
PROT UR STRIP-MCNC: 100 MG/DL
PROTHROMBIN TIME: 15.6 SEC (ref 12.6–14.5)
RBC # BLD AUTO: 3.81 M/UL (ref 4.05–5.2)
RBC #/AREA URNS HPF: >100 /HPF
SODIUM SERPL-SCNC: 138 MMOL/L (ref 136–145)
SP GR UR REFRACTOMETRY: 1.02 (ref 1–1.02)
UROBILINOGEN UR QL STRIP.AUTO: 1 EU/DL (ref 0.2–1)
WBC # BLD AUTO: 6.5 K/UL (ref 4.3–11.1)
WBC URNS QL MICRO: ABNORMAL /HPF
YEAST URNS QL MICRO: ABNORMAL

## 2022-08-10 PROCEDURE — 81001 URINALYSIS AUTO W/SCOPE: CPT

## 2022-08-10 PROCEDURE — 71046 X-RAY EXAM CHEST 2 VIEWS: CPT

## 2022-08-10 PROCEDURE — 80053 COMPREHEN METABOLIC PANEL: CPT

## 2022-08-10 PROCEDURE — 85610 PROTHROMBIN TIME: CPT

## 2022-08-10 PROCEDURE — 93005 ELECTROCARDIOGRAM TRACING: CPT | Performed by: SURGERY

## 2022-08-10 PROCEDURE — 85730 THROMBOPLASTIN TIME PARTIAL: CPT

## 2022-08-10 PROCEDURE — 85025 COMPLETE CBC W/AUTO DIFF WBC: CPT

## 2022-08-10 NOTE — PERIOP NOTE
PLEASE CONTINUE TAKING ALL PRESCRIPTION MEDICATIONS UP TO THE DAY OF SURGERY UNLESS OTHERWISE DIRECTED BELOW. DISCONTINUE all vitamins and supplements 7 days prior to surgery. DISCONTINUE Non-Steriodal Anti-Inflammatory (NSAIDS) such as Advil and Aleve 5 days prior to surgery. Home Medications to take  the day of surgery   Valcyclovir    Pantoprazole (Protonix)          Home Medications   to Hold   apixaban (Eliquis) hold per surgeon's instructions        Comments       On the day before surgery please take Acetaminophen 1000mg in the morning and then again before bed. You may substitute for Tylenol 650 mg. Please do not bring home medications with you on the day of surgery unless otherwise directed by your nurse. If you are instructed to bring home medications, please give them to your nurse as they will be administered by the nursing staff. If you have any questions, please call [facility] [phone] or [facility] [phone]. A copy of this note was provided to the patient for reference.

## 2022-08-10 NOTE — PERIOP NOTE
Patient verified name and     Order for consent was found in EHR and matches case posting; patient verified. Type III surgery, walk-in assessment complete. Labs per surgeon: cbc, cmp, ua, pt/inr, ptt, T&S  Labs per anesthesia protocol: no additional  EKG: NSR    MRSA/MSSA swab collected; pharmacy to review and dose antibiotic as appropriate. Hospital approved surgical skin cleanser and instructions given per hospital policy. Patient provided with and instructed on educational handouts including Guide to Surgery, Pain Management, Hand Hygiene, Blood Transfusion Education, and [place] [facility] Brochure. Patient answered medical/surgical history questions at their best of ability. All prior to admission medications documented in [facility]. Original medication prescription bottle not visualized during patient appointment. Patient instructed to hold all vitamins 7 days prior to surgery and NSAIDS 5 days prior to surgery, patient verbalized understanding. Patient teach back successful and patient demonstrates knowledge of instructions.

## 2022-08-13 PROCEDURE — 2500000003 HC RX 250 WO HCPCS

## 2022-08-16 ENCOUNTER — ANESTHESIA EVENT (OUTPATIENT)
Dept: SURGERY | Age: 47
DRG: 330 | End: 2022-08-16
Payer: COMMERCIAL

## 2022-08-16 DIAGNOSIS — C80.1 CARCINOMA OF UNKNOWN PRIMARY (HCC): ICD-10-CM

## 2022-08-16 DIAGNOSIS — C78.6 METASTASIS TO PERITONEUM OF UNKNOWN PRIMARY (HCC): ICD-10-CM

## 2022-08-16 DIAGNOSIS — C80.1 METASTASIS TO PERITONEUM OF UNKNOWN PRIMARY (HCC): ICD-10-CM

## 2022-08-16 NOTE — PERIOP NOTE
Directly informed patient and or family member of pre op arrival time 0 on 8/17. All questions answered. Pre op instructions reviewed. Left contact information for any additional questions or needs.

## 2022-08-17 ENCOUNTER — APPOINTMENT (OUTPATIENT)
Dept: GENERAL RADIOLOGY | Age: 47
DRG: 330 | End: 2022-08-17
Attending: SURGERY
Payer: COMMERCIAL

## 2022-08-17 ENCOUNTER — ANESTHESIA (OUTPATIENT)
Dept: SURGERY | Age: 47
DRG: 330 | End: 2022-08-17
Payer: COMMERCIAL

## 2022-08-17 ENCOUNTER — HOSPITAL ENCOUNTER (INPATIENT)
Age: 47
LOS: 4 days | Discharge: HOME | DRG: 330 | End: 2022-08-21
Attending: SURGERY | Admitting: SURGERY
Payer: COMMERCIAL

## 2022-08-17 DIAGNOSIS — C48.2 PERITONEAL CARCINOMA (HCC): ICD-10-CM

## 2022-08-17 DIAGNOSIS — Z01.818 PRE-OP TESTING: ICD-10-CM

## 2022-08-17 PROBLEM — C78.6 PERITONEAL CARCINOMATOSIS (HCC): Status: ACTIVE | Noted: 2022-08-17

## 2022-08-17 LAB
ANION GAP SERPL CALC-SCNC: 4 MMOL/L (ref 7–16)
BASE EXCESS BLD CALC-SCNC: 1.9 MMOL/L
BUN SERPL-MCNC: 9 MG/DL (ref 6–23)
CA-I BLD-MCNC: 1.14 MMOL/L (ref 1.12–1.32)
CALCIUM SERPL-MCNC: 9 MG/DL (ref 8.3–10.4)
CHLORIDE SERPL-SCNC: 110 MMOL/L (ref 98–107)
CO2 BLD-SCNC: 25 MMOL/L (ref 13–23)
CO2 SERPL-SCNC: 26 MMOL/L (ref 21–32)
CREAT SERPL-MCNC: 0.5 MG/DL (ref 0.6–1)
ERYTHROCYTE [DISTWIDTH] IN BLOOD BY AUTOMATED COUNT: 13.2 % (ref 11.9–14.6)
GLUCOSE BLD STRIP.AUTO-MCNC: 120 MG/DL (ref 65–100)
GLUCOSE BLD STRIP.AUTO-MCNC: 87 MG/DL (ref 65–100)
GLUCOSE SERPL-MCNC: 96 MG/DL (ref 65–100)
HCO3 BLD-SCNC: 24.7 MMOL/L (ref 22–26)
HCT VFR BLD AUTO: 29.2 % (ref 35.8–46.3)
HGB BLD-MCNC: 9.2 G/DL (ref 11.7–15.4)
HISTORY CHECK: NORMAL
MAGNESIUM SERPL-MCNC: 1.8 MG/DL (ref 1.8–2.4)
MCH RBC QN AUTO: 30.2 PG (ref 26.1–32.9)
MCHC RBC AUTO-ENTMCNC: 31.5 G/DL (ref 31.4–35)
MCV RBC AUTO: 95.7 FL (ref 79.6–97.8)
NRBC # BLD: 0 K/UL (ref 0–0.2)
PCO2 BLD: 30.9 MMHG (ref 35–45)
PH BLD: 7.51 [PH] (ref 7.35–7.45)
PLATELET # BLD AUTO: 192 K/UL (ref 150–450)
PMV BLD AUTO: 10.2 FL (ref 9.4–12.3)
PO2 BLD: 665 MMHG (ref 75–100)
POTASSIUM BLD-SCNC: 3.7 MMOL/L (ref 3.5–5.1)
POTASSIUM SERPL-SCNC: 3.7 MMOL/L (ref 3.5–5.1)
RBC # BLD AUTO: 3.05 M/UL (ref 4.05–5.2)
SAO2 % BLD: 100 %
SERVICE CMNT-IMP: ABNORMAL
SERVICE CMNT-IMP: ABNORMAL
SODIUM BLD-SCNC: 141 MMOL/L (ref 136–145)
SODIUM SERPL-SCNC: 140 MMOL/L (ref 136–145)
SPECIMEN SITE: ABNORMAL
WBC # BLD AUTO: 5.4 K/UL (ref 4.3–11.1)

## 2022-08-17 PROCEDURE — 6360000002 HC RX W HCPCS: Performed by: PHYSICIAN ASSISTANT

## 2022-08-17 PROCEDURE — 7100000000 HC PACU RECOVERY - FIRST 15 MIN: Performed by: SURGERY

## 2022-08-17 PROCEDURE — 3600000004 HC SURGERY LEVEL 4 BASE: Performed by: SURGERY

## 2022-08-17 PROCEDURE — 3700000000 HC ANESTHESIA ATTENDED CARE: Performed by: SURGERY

## 2022-08-17 PROCEDURE — 7100000001 HC PACU RECOVERY - ADDTL 15 MIN: Performed by: SURGERY

## 2022-08-17 PROCEDURE — 2580000003 HC RX 258: Performed by: PHYSICIAN ASSISTANT

## 2022-08-17 PROCEDURE — 85027 COMPLETE CBC AUTOMATED: CPT

## 2022-08-17 PROCEDURE — 2709999900 HC NON-CHARGEABLE SUPPLY: Performed by: SURGERY

## 2022-08-17 PROCEDURE — 71045 X-RAY EXAM CHEST 1 VIEW: CPT

## 2022-08-17 PROCEDURE — 3700000001 HC ADD 15 MINUTES (ANESTHESIA): Performed by: SURGERY

## 2022-08-17 PROCEDURE — 44130 BOWEL TO BOWEL FUSION: CPT | Performed by: PHYSICIAN ASSISTANT

## 2022-08-17 PROCEDURE — 80048 BASIC METABOLIC PNL TOTAL CA: CPT

## 2022-08-17 PROCEDURE — 2580000003 HC RX 258: Performed by: ANESTHESIOLOGY

## 2022-08-17 PROCEDURE — 3600000014 HC SURGERY LEVEL 4 ADDTL 15MIN: Performed by: SURGERY

## 2022-08-17 PROCEDURE — 6360000002 HC RX W HCPCS: Performed by: SURGERY

## 2022-08-17 PROCEDURE — 83735 ASSAY OF MAGNESIUM: CPT

## 2022-08-17 PROCEDURE — 94760 N-INVAS EAR/PLS OXIMETRY 1: CPT

## 2022-08-17 PROCEDURE — 1100000000 HC RM PRIVATE

## 2022-08-17 PROCEDURE — 2720000010 HC SURG SUPPLY STERILE: Performed by: SURGERY

## 2022-08-17 PROCEDURE — 86920 COMPATIBILITY TEST SPIN: CPT

## 2022-08-17 PROCEDURE — 82962 GLUCOSE BLOOD TEST: CPT

## 2022-08-17 PROCEDURE — 6360000002 HC RX W HCPCS: Performed by: ANESTHESIOLOGY

## 2022-08-17 PROCEDURE — C1713 ANCHOR/SCREW BN/BN,TIS/BN: HCPCS | Performed by: SURGERY

## 2022-08-17 PROCEDURE — 82803 BLOOD GASES ANY COMBINATION: CPT

## 2022-08-17 PROCEDURE — 86901 BLOOD TYPING SEROLOGIC RH(D): CPT

## 2022-08-17 PROCEDURE — C1751 CATH, INF, PER/CENT/MIDLINE: HCPCS | Performed by: SURGERY

## 2022-08-17 PROCEDURE — 44130 BOWEL TO BOWEL FUSION: CPT | Performed by: SURGERY

## 2022-08-17 PROCEDURE — 02H633Z INSERTION OF INFUSION DEVICE INTO RIGHT ATRIUM, PERCUTANEOUS APPROACH: ICD-10-PCS | Performed by: ANESTHESIOLOGY

## 2022-08-17 PROCEDURE — 2500000003 HC RX 250 WO HCPCS: Performed by: SURGERY

## 2022-08-17 PROCEDURE — 0D180Z8 BYPASS SMALL INTESTINE TO SMALL INTESTINE, OPEN APPROACH: ICD-10-PCS | Performed by: SURGERY

## 2022-08-17 RX ORDER — OXYCODONE HYDROCHLORIDE 5 MG/1
5 TABLET ORAL PRN
Status: DISCONTINUED | OUTPATIENT
Start: 2022-08-17 | End: 2022-08-17 | Stop reason: HOSPADM

## 2022-08-17 RX ORDER — SODIUM CHLORIDE 9 MG/ML
INJECTION, SOLUTION INTRAVENOUS PRN
Status: DISCONTINUED | OUTPATIENT
Start: 2022-08-17 | End: 2022-08-17 | Stop reason: HOSPADM

## 2022-08-17 RX ORDER — ONDANSETRON 2 MG/ML
4 INJECTION INTRAMUSCULAR; INTRAVENOUS EVERY 6 HOURS PRN
Status: DISCONTINUED | OUTPATIENT
Start: 2022-08-17 | End: 2022-08-21 | Stop reason: HOSPADM

## 2022-08-17 RX ORDER — SODIUM CHLORIDE, SODIUM LACTATE, POTASSIUM CHLORIDE, CALCIUM CHLORIDE 600; 310; 30; 20 MG/100ML; MG/100ML; MG/100ML; MG/100ML
INJECTION, SOLUTION INTRAVENOUS CONTINUOUS
Status: ACTIVE | OUTPATIENT
Start: 2022-08-17 | End: 2022-08-18

## 2022-08-17 RX ORDER — GLYCOPYRROLATE 0.2 MG/ML
INJECTION INTRAMUSCULAR; INTRAVENOUS PRN
Status: DISCONTINUED | OUTPATIENT
Start: 2022-08-17 | End: 2022-08-17 | Stop reason: SDUPTHER

## 2022-08-17 RX ORDER — VECURONIUM BROMIDE 1 MG/ML
INJECTION, POWDER, LYOPHILIZED, FOR SOLUTION INTRAVENOUS PRN
Status: DISCONTINUED | OUTPATIENT
Start: 2022-08-17 | End: 2022-08-17 | Stop reason: SDUPTHER

## 2022-08-17 RX ORDER — OXYCODONE HYDROCHLORIDE 5 MG/1
10 TABLET ORAL PRN
Status: DISCONTINUED | OUTPATIENT
Start: 2022-08-17 | End: 2022-08-17 | Stop reason: HOSPADM

## 2022-08-17 RX ORDER — SODIUM CHLORIDE 0.9 % (FLUSH) 0.9 %
5-40 SYRINGE (ML) INJECTION EVERY 12 HOURS SCHEDULED
Status: DISCONTINUED | OUTPATIENT
Start: 2022-08-17 | End: 2022-08-17 | Stop reason: HOSPADM

## 2022-08-17 RX ORDER — SODIUM CHLORIDE 0.9 % (FLUSH) 0.9 %
5-40 SYRINGE (ML) INJECTION PRN
Status: DISCONTINUED | OUTPATIENT
Start: 2022-08-17 | End: 2022-08-17 | Stop reason: HOSPADM

## 2022-08-17 RX ORDER — OXYCODONE HYDROCHLORIDE 5 MG/1
5 TABLET ORAL EVERY 4 HOURS PRN
Status: DISCONTINUED | OUTPATIENT
Start: 2022-08-17 | End: 2022-08-21 | Stop reason: HOSPADM

## 2022-08-17 RX ORDER — ONDANSETRON 4 MG/1
4 TABLET, ORALLY DISINTEGRATING ORAL EVERY 4 HOURS PRN
Status: DISCONTINUED | OUTPATIENT
Start: 2022-08-17 | End: 2022-08-21 | Stop reason: HOSPADM

## 2022-08-17 RX ORDER — ACETAMINOPHEN 500 MG
1000 TABLET ORAL EVERY 8 HOURS PRN
Status: DISCONTINUED | OUTPATIENT
Start: 2022-08-17 | End: 2022-08-21 | Stop reason: HOSPADM

## 2022-08-17 RX ORDER — SODIUM CHLORIDE 0.9 % (FLUSH) 0.9 %
5-40 SYRINGE (ML) INJECTION EVERY 12 HOURS SCHEDULED
Status: DISCONTINUED | OUTPATIENT
Start: 2022-08-17 | End: 2022-08-21 | Stop reason: HOSPADM

## 2022-08-17 RX ORDER — LIDOCAINE HYDROCHLORIDE ANHYDROUS AND DEXTROSE MONOHYDRATE .4; 5 G/100ML; G/100ML
INJECTION, SOLUTION INTRAVENOUS CONTINUOUS PRN
Status: DISCONTINUED | OUTPATIENT
Start: 2022-08-17 | End: 2022-08-17 | Stop reason: SDUPTHER

## 2022-08-17 RX ORDER — SODIUM CHLORIDE, SODIUM LACTATE, POTASSIUM CHLORIDE, CALCIUM CHLORIDE 600; 310; 30; 20 MG/100ML; MG/100ML; MG/100ML; MG/100ML
100 INJECTION, SOLUTION INTRAVENOUS CONTINUOUS
Status: DISCONTINUED | OUTPATIENT
Start: 2022-08-17 | End: 2022-08-17 | Stop reason: HOSPADM

## 2022-08-17 RX ORDER — KETAMINE HYDROCHLORIDE 50 MG/ML
INJECTION, SOLUTION, CONCENTRATE INTRAMUSCULAR; INTRAVENOUS PRN
Status: DISCONTINUED | OUTPATIENT
Start: 2022-08-17 | End: 2022-08-17 | Stop reason: SDUPTHER

## 2022-08-17 RX ORDER — MIDAZOLAM HYDROCHLORIDE 2 MG/2ML
2 INJECTION, SOLUTION INTRAMUSCULAR; INTRAVENOUS
Status: COMPLETED | OUTPATIENT
Start: 2022-08-17 | End: 2022-08-17

## 2022-08-17 RX ORDER — HYDROMORPHONE HYDROCHLORIDE 2 MG/ML
0.5 INJECTION, SOLUTION INTRAMUSCULAR; INTRAVENOUS; SUBCUTANEOUS EVERY 5 MIN PRN
Status: DISCONTINUED | OUTPATIENT
Start: 2022-08-17 | End: 2022-08-17 | Stop reason: HOSPADM

## 2022-08-17 RX ORDER — ONDANSETRON 2 MG/ML
INJECTION INTRAMUSCULAR; INTRAVENOUS PRN
Status: DISCONTINUED | OUTPATIENT
Start: 2022-08-17 | End: 2022-08-17 | Stop reason: SDUPTHER

## 2022-08-17 RX ORDER — LIDOCAINE HYDROCHLORIDE 10 MG/ML
1 INJECTION, SOLUTION INFILTRATION; PERINEURAL
Status: DISCONTINUED | OUTPATIENT
Start: 2022-08-17 | End: 2022-08-17 | Stop reason: HOSPADM

## 2022-08-17 RX ORDER — SODIUM CHLORIDE 9 MG/ML
INJECTION, SOLUTION INTRAVENOUS PRN
Status: DISCONTINUED | OUTPATIENT
Start: 2022-08-17 | End: 2022-08-21 | Stop reason: HOSPADM

## 2022-08-17 RX ORDER — KETOROLAC TROMETHAMINE 15 MG/ML
15 INJECTION, SOLUTION INTRAMUSCULAR; INTRAVENOUS EVERY 6 HOURS PRN
Status: DISCONTINUED | OUTPATIENT
Start: 2022-08-17 | End: 2022-08-21 | Stop reason: HOSPADM

## 2022-08-17 RX ORDER — NEOSTIGMINE METHYLSULFATE 1 MG/ML
INJECTION, SOLUTION INTRAVENOUS PRN
Status: DISCONTINUED | OUTPATIENT
Start: 2022-08-17 | End: 2022-08-17 | Stop reason: SDUPTHER

## 2022-08-17 RX ORDER — SODIUM CHLORIDE, SODIUM LACTATE, POTASSIUM CHLORIDE, CALCIUM CHLORIDE 600; 310; 30; 20 MG/100ML; MG/100ML; MG/100ML; MG/100ML
INJECTION, SOLUTION INTRAVENOUS CONTINUOUS
Status: DISCONTINUED | OUTPATIENT
Start: 2022-08-17 | End: 2022-08-21 | Stop reason: HOSPADM

## 2022-08-17 RX ORDER — DEXAMETHASONE SODIUM PHOSPHATE 4 MG/ML
INJECTION, SOLUTION INTRA-ARTICULAR; INTRALESIONAL; INTRAMUSCULAR; INTRAVENOUS; SOFT TISSUE PRN
Status: DISCONTINUED | OUTPATIENT
Start: 2022-08-17 | End: 2022-08-17 | Stop reason: SDUPTHER

## 2022-08-17 RX ORDER — FENTANYL CITRATE 50 UG/ML
100 INJECTION, SOLUTION INTRAMUSCULAR; INTRAVENOUS PRN
Status: DISCONTINUED | OUTPATIENT
Start: 2022-08-17 | End: 2022-08-17 | Stop reason: HOSPADM

## 2022-08-17 RX ORDER — ENOXAPARIN SODIUM 100 MG/ML
40 INJECTION SUBCUTANEOUS EVERY 24 HOURS
Status: DISCONTINUED | OUTPATIENT
Start: 2022-08-17 | End: 2022-08-21 | Stop reason: HOSPADM

## 2022-08-17 RX ORDER — LIDOCAINE HYDROCHLORIDE 20 MG/ML
INJECTION, SOLUTION EPIDURAL; INFILTRATION; INTRACAUDAL; PERINEURAL PRN
Status: DISCONTINUED | OUTPATIENT
Start: 2022-08-17 | End: 2022-08-17 | Stop reason: SDUPTHER

## 2022-08-17 RX ORDER — ROCURONIUM BROMIDE 10 MG/ML
INJECTION, SOLUTION INTRAVENOUS PRN
Status: DISCONTINUED | OUTPATIENT
Start: 2022-08-17 | End: 2022-08-17 | Stop reason: SDUPTHER

## 2022-08-17 RX ORDER — FENTANYL CITRATE 50 UG/ML
50 INJECTION, SOLUTION INTRAMUSCULAR; INTRAVENOUS PRN
Status: DISCONTINUED | OUTPATIENT
Start: 2022-08-17 | End: 2022-08-17 | Stop reason: HOSPADM

## 2022-08-17 RX ORDER — LIDOCAINE HYDROCHLORIDE ANHYDROUS AND DEXTROSE MONOHYDRATE .4; 5 G/100ML; G/100ML
1 INJECTION, SOLUTION INTRAVENOUS CONTINUOUS
Status: DISPENSED | OUTPATIENT
Start: 2022-08-17 | End: 2022-08-18

## 2022-08-17 RX ORDER — METRONIDAZOLE 500 MG/100ML
500 INJECTION, SOLUTION INTRAVENOUS ONCE
Status: COMPLETED | OUTPATIENT
Start: 2022-08-17 | End: 2022-08-17

## 2022-08-17 RX ORDER — PROPOFOL 10 MG/ML
INJECTION, EMULSION INTRAVENOUS PRN
Status: DISCONTINUED | OUTPATIENT
Start: 2022-08-17 | End: 2022-08-17 | Stop reason: SDUPTHER

## 2022-08-17 RX ORDER — HYDROMORPHONE HYDROCHLORIDE 1 MG/ML
1 INJECTION, SOLUTION INTRAMUSCULAR; INTRAVENOUS; SUBCUTANEOUS EVERY 4 HOURS PRN
Status: DISCONTINUED | OUTPATIENT
Start: 2022-08-17 | End: 2022-08-21 | Stop reason: HOSPADM

## 2022-08-17 RX ORDER — ONDANSETRON 2 MG/ML
4 INJECTION INTRAMUSCULAR; INTRAVENOUS
Status: DISCONTINUED | OUTPATIENT
Start: 2022-08-17 | End: 2022-08-17 | Stop reason: HOSPADM

## 2022-08-17 RX ADMIN — HYDROMORPHONE HYDROCHLORIDE 0.5 MG: 2 INJECTION, SOLUTION INTRAMUSCULAR; INTRAVENOUS; SUBCUTANEOUS at 11:00

## 2022-08-17 RX ADMIN — SODIUM CHLORIDE, SODIUM LACTATE, POTASSIUM CHLORIDE, AND CALCIUM CHLORIDE: 600; 310; 30; 20 INJECTION, SOLUTION INTRAVENOUS at 14:30

## 2022-08-17 RX ADMIN — PROPOFOL 200 MG: 10 INJECTION, EMULSION INTRAVENOUS at 07:26

## 2022-08-17 RX ADMIN — Medication 2000 MG: at 08:02

## 2022-08-17 RX ADMIN — HYDROMORPHONE HYDROCHLORIDE 0.5 MG: 2 INJECTION, SOLUTION INTRAMUSCULAR; INTRAVENOUS; SUBCUTANEOUS at 10:55

## 2022-08-17 RX ADMIN — Medication 10 ML: at 13:58

## 2022-08-17 RX ADMIN — SODIUM CHLORIDE, SODIUM LACTATE, POTASSIUM CHLORIDE, AND CALCIUM CHLORIDE: 600; 310; 30; 20 INJECTION, SOLUTION INTRAVENOUS at 09:10

## 2022-08-17 RX ADMIN — NEOSTIGMINE METHYLSULFATE 3 MG: 1 INJECTION, SOLUTION INTRAVENOUS at 09:38

## 2022-08-17 RX ADMIN — KETAMINE HYDROCHLORIDE 20 MG: 50 INJECTION, SOLUTION, CONCENTRATE INTRAMUSCULAR; INTRAVENOUS at 07:26

## 2022-08-17 RX ADMIN — SODIUM CHLORIDE, PRESERVATIVE FREE 10 ML: 5 INJECTION INTRAVENOUS at 21:55

## 2022-08-17 RX ADMIN — KETAMINE HYDROCHLORIDE 15.3 MG/HR: 50 INJECTION, SOLUTION, CONCENTRATE INTRAMUSCULAR; INTRAVENOUS at 07:58

## 2022-08-17 RX ADMIN — METRONIDAZOLE 500 MG: 500 INJECTION, SOLUTION INTRAVENOUS at 06:10

## 2022-08-17 RX ADMIN — MIDAZOLAM HYDROCHLORIDE 2 MG: 1 INJECTION, SOLUTION INTRAMUSCULAR; INTRAVENOUS at 07:24

## 2022-08-17 RX ADMIN — ONDANSETRON 4 MG: 2 INJECTION INTRAMUSCULAR; INTRAVENOUS at 08:14

## 2022-08-17 RX ADMIN — VECURONIUM BROMIDE 1 MG: 1 INJECTION, POWDER, LYOPHILIZED, FOR SOLUTION INTRAVENOUS at 08:26

## 2022-08-17 RX ADMIN — ENOXAPARIN SODIUM 40 MG: 100 INJECTION SUBCUTANEOUS at 21:55

## 2022-08-17 RX ADMIN — VECURONIUM BROMIDE 2 MG: 1 INJECTION, POWDER, LYOPHILIZED, FOR SOLUTION INTRAVENOUS at 08:45

## 2022-08-17 RX ADMIN — LIDOCAINE HYDROCHLORIDE ANHYDROUS AND DEXTROSE MONOHYDRATE 1 MG/KG/HR: .4; 5 INJECTION, SOLUTION INTRAVENOUS at 07:58

## 2022-08-17 RX ADMIN — ROCURONIUM BROMIDE 50 MG: 10 INJECTION, SOLUTION INTRAVENOUS at 07:27

## 2022-08-17 RX ADMIN — SODIUM CHLORIDE, SODIUM LACTATE, POTASSIUM CHLORIDE, AND CALCIUM CHLORIDE 100 ML/HR: 600; 310; 30; 20 INJECTION, SOLUTION INTRAVENOUS at 06:10

## 2022-08-17 RX ADMIN — HYDROMORPHONE HYDROCHLORIDE 1 MG: 1 INJECTION, SOLUTION INTRAMUSCULAR; INTRAVENOUS; SUBCUTANEOUS at 13:54

## 2022-08-17 RX ADMIN — LIDOCAINE HYDROCHLORIDE 100 MG: 20 INJECTION, SOLUTION EPIDURAL; INFILTRATION; INTRACAUDAL; PERINEURAL at 07:26

## 2022-08-17 RX ADMIN — DEXAMETHASONE SODIUM PHOSPHATE 10 MG: 4 INJECTION, SOLUTION INTRA-ARTICULAR; INTRALESIONAL; INTRAMUSCULAR; INTRAVENOUS; SOFT TISSUE at 07:33

## 2022-08-17 RX ADMIN — GLYCOPYRROLATE 0.4 MG: 0.2 INJECTION INTRAMUSCULAR; INTRAVENOUS at 09:38

## 2022-08-17 RX ADMIN — HYDROMORPHONE HYDROCHLORIDE 1 MG: 1 INJECTION, SOLUTION INTRAMUSCULAR; INTRAVENOUS; SUBCUTANEOUS at 18:01

## 2022-08-17 ASSESSMENT — PAIN - FUNCTIONAL ASSESSMENT
PAIN_FUNCTIONAL_ASSESSMENT: 0-10

## 2022-08-17 ASSESSMENT — PAIN SCALES - GENERAL
PAINLEVEL_OUTOF10: 7

## 2022-08-17 ASSESSMENT — PAIN DESCRIPTION - ORIENTATION
ORIENTATION: MID
ORIENTATION: ANTERIOR
ORIENTATION: ANTERIOR

## 2022-08-17 ASSESSMENT — PAIN DESCRIPTION - LOCATION
LOCATION: ABDOMEN

## 2022-08-17 ASSESSMENT — PAIN DESCRIPTION - DESCRIPTORS
DESCRIPTORS: ACHING
DESCRIPTORS: SORE
DESCRIPTORS: ACHING
DESCRIPTORS: ACHING

## 2022-08-17 NOTE — H&P
[facility]  [address] [place] [address]  [phone]        Patient:  Harjinder Fitzgerald  :      GILMAR Fitzgerald is a 52 y.o. female who presents with peritoneal carcinomatosis. The patient states that in January she began to develop right flank pain and was having difficulty having bowel movements. She was concerned that she had a kidney stone and had a CT scan performed. The CT demonstrated a linear calcific density along the course of the proximal left ureter with associated hydronephrosis. There was also stranding throughout the retroperitoneal soft tissues anterior to the left psoas muscle with pelvic ascites. The patient then underwent a diagnostic laparoscopy with Dr. Humberto Alba on 2022 for concern for possible pelvic malignancy. At the time of the diagnostic laparoscopy the patient was found to have scattered carcinomatosis involving the right diaphragm, left diaphragm and peritoneal cavity. Her left ovary was slightly enlarged versus right, but not clearly malignant. Both tubes and the uterus were surgically absent. There was no evidence of bowel obstruction. She had what appeared to be omental disease at the greater curvature of her stomach. 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 The patient then underwent a laparotomy with bilateral salpingo-oophorectomy and cystoscopy with bilateral double-J ureteral stents placed on 2/15/2022 by Dr. Humberto Alba. At this time the patient was found to have disease as previously seen as well as on the right colon externally at least. The pelvis was essentially frozen with significant induration and adhesion in the sigmoid area rising out of the pelvis, most consistent with a primary in that location.   She appeared to have bilateral fibrosis of the peritoneum extending into the retroperitoneal space. Both adnexae were removed but were not obvious primary sources for her disease. Final pathology of the ovaries revealed focus of metastatic poorly differentiated carcinoma with focal signet ring features involving one ovary. She was then started on chemotherapy of FOLFOX, Oxaliplatin, and Avastin. She has had 8 cycles of chemotherapy. The patient has had her ureteral stents replaced multiple times as well. At this time, the patient states that she is not having any abdominal pain. She is tolerating a regular diet. She his having regular BMs. She is not having any difficulties with urination. She states that she has had about a 25lb weight loss since February. The patients most recent CT scan demonstrates increased indeterminant attenuating fluid within the dependent pelvis and space of retzius with additional mesentery/peritoneal nodularity and stranding concerning for carcinomatosis. There was increase conspicuously of a 7mm right pelvic sidewall lymph node as well. The patient does have a past medical history of iron deficient anemia for which she has had a Hysterectomy for. She has also had a gastric sleeve in the past. She states that she was found to have a PE and is on Xarelto as well.                Past Medical History        Past Medical History:   Diagnosis Date    Anemia       2012-- not a problem since hyst    Colon cancer ([address] dx 2/2022      plans for chemo--- followed by dr Eran EL-19 12/2020     no hospitalization    GERD (gastroesophageal reflux disease)       managed with med    History of colonoscopy 05/2018     louis Byrd (see media note), R 2028    Hx of blood clots 06/2022     per pt \"small clot on lung identified by CT scan\"  CT Scan impression:- Nonobstructive pulmonary filling defect involving Left Lower Lobe    Hypotension       asymptomatic    Obstruction of fallopian tube       per pt has \"1 good tube\"    Peritoneal carcinomatosis ([address] Weight loss       80lbs weight loss after gastric sleeve         Current Facility-Administered Medications          Current Outpatient Medications   Medication Sig Dispense Refill    rivaroxaban 15 & 20 MG Starter Pack Take as directed on package. 1 each 0    potassium chloride (KLOR-CON M) 20 MEQ extended release tablet Take 1 tablet by mouth 2 times daily 60 tablet 1    ergocalciferol (ERGOCALCIFEROL) 1.25 MG (19234 UT) capsule Take 50,000 Units by mouth every 7 days Takes on Thursday        lidocaine-prilocaine (EMLA) 2.5-2.5 % cream Apply to port about 45 minutes prior to access        metoclopramide (REGLAN) 10 MG tablet Take 10 mg by mouth 4 times daily (before meals and nightly)        ondansetron (ZOFRAN) 8 MG tablet Take 8 mg by mouth every 8 hours as needed  (Patient not taking: Reported on 2022)        pantoprazole (PROTONIX) 40 MG tablet 2 times daily        valACYclovir (VALTREX) 500 MG tablet Take 500 mg by mouth daily          No current facility-administered medications for this visit.          No Known Allergies  Past Surgical History         Past Surgical History:   Procedure Laterality Date     SECTION       CYSTOSCOPY Bilateral 2022     CYSTOSCOPY BILATERAL RETROGRADE PYELOGRAM performed by Ludivina Calhoun MD at [address] Bilateral 2022     BILATERAL CYSTOSCOPY URETERAL STENT EXCHANGE performed by Ludivina Calhoun MD at [address]   2014     gastric sleeve- Ch[place]hari    HYSTERECTOMY (CERVIX STATUS UNKNOWN)         2018    HYSTERECTOMY (CERVIX STATUS UNKNOWN)   2020     TLH w/ Bilateral salpingectomy and left oophorectomy    IR PORT PLACEMENT EQUAL OR GREATER THAN 5 YEARS   2022    IR PORT PLACEMENT EQUAL OR GREATER THAN 5 YEARS   2022     IR PORT PLACEMENT EQUAL OR GREATER THAN 5 YEARS 2022 [facility]    MYOMECTOMY   age Racheal Brady 21s\"     also \"unblocked her FT\"    TONSILLECTOMY        UPPER GASTROINTESTINAL ENDOSCOPY         with dilation    UROLOGICAL SURGERY Bilateral 03/15/2022     cysto         Family History         Family History   Problem Relation Age of Onset    Heart Disease Maternal Grandmother      Hypertension Maternal Grandmother      Heart Disease Maternal Grandfather      Hypertension Maternal Grandfather      Pulmonary Embolism Neg Hx      Colon Cancer Neg Hx      Deep Vein Thrombosis Neg Hx      Ovarian Cancer Neg Hx      Prostate Cancer Neg Hx      Breast Cancer Neg Hx           Social History           Tobacco Use    Smoking status: Never Smoker    Smokeless tobacco: Never Used   Substance Use Topics    Alcohol use: Not Currently         Review of Systems  A comprehensive review of systems was negative except for that written in the HPI.      Physical Exam  /80   Pulse 78   Ht 5' 4\" (1.626 m)   Wt 113 lb (51.3 kg)   SpO2 98%   BMI 19.40 kg/m²       General:          Alert, oriented, cooperative, awake patient in no acute distress  Skin:                Warm, moist with good texture  Eyes:               Sclera are clear, extraocular muscles intact  HENT:             Normocephalic; oral mucosa moist, nares patent; neck is supple; trachea midline  Respiratory:     Lungs clear to auscultation bilaterally, breathing is non-labored  Chest:              Symmetric throughout one respiratory excursion; no supraclavicular lymphadenopathy  CV:                  Regular rate and rhythm, no appreciable murmurs, rubs, gallops  Abdomen:        Soft, protuberant but non-distended; bowel sounds are normoactive  Extremities:     No cyanosis, clubbing or edema  Neurological:   No focal signs        Labs:         Lab Results   Component Value Date     WBC 5.5 07/07/2022     HGB 11.7 07/07/2022     HCT 37.5 07/07/2022     MCV 95.4 07/07/2022      (L) 07/07/2022            Lab Results   Component Value Date      07/07/2022     K 4.4 07/07/2022      07/07/2022     CO2 28 07/07/2022     BUN 10 07/07/2022     CREATININE 0.70 07/07/2022     GLUCOSE 99 07/07/2022     CALCIUM 9.5 07/07/2022     PROT 7.5 07/07/2022     LABALBU 3.6 07/07/2022     BILITOT 0.3 07/07/2022     ALKPHOS 69 07/07/2022     AST 21 07/07/2022     ALT 28 07/07/2022     LABGLOM >60 07/07/2022     GFRAA >60 07/07/2022     AGRATIO 1.1 (L) 05/12/2022     GLOB 3.9 (H) 07/07/2022      CT:  CT Result (most recent):  CT CHEST ABDOMEN PELVIS W CONTRAST 06/22/2022     Narrative  EXAMINATION: CT CHEST ABDOMEN PELVIS W CONTRAST 6/22/2022 12:15 PM     ACCESSION NUMBER: [id number]     COMPARISON: CT abdomen pelvis March 2, 2022. PET/CT February 8, 2022     INDICATION: History of poorly differentiated ovarian adenocarcinoma. Restaging     TECHNIQUE: Multiple contiguous axial CT images of the chest, abdomen, and pelvis  were obtained from the lung apices to the symphysis pubis after the intravenous  administration of 100 mL Isovue 370. Reformats are provided. Radiation dose reduction techniques were used for this study. Our CT scanners  use one or all of the following: Automated exposure control, adjustment of the  mA and/or kV according to patient size, iterative reconstruction. FINDINGS:  CHEST:  Base of Neck/Chest Wall: Unremarkable. Mediastinum: No enlarged mediastinal or hilar adenopathy. The heart is normal in  size. No pericardial effusion. Right chest Chemo-Port with distal tip  terminating within the right atrium. The thoracic aorta and main pulmonary  artery are normal caliber. Although study was not timed for the opacification of  the pulmonary arteries there are nonocclusive filling defects of the opacified  left lower lobe segmental vessels (axial image number 31 and coronal image  number 52). Airways/Lungs/Pleura: The large central airways are clear. Lung volumes are  normal without focal consolidation, suspicious pulmonary nodule, or groundglass  attenuation.      No pleural fluid collections or pneumothorax. ABDOMEN AND PELVIS:  Liver: Diffuse decreased attenuation of the liver parenchyma compatible the  spleen most suggestive of hepatic steatosis. Not discrete hepatic lesion. The  portal hepatic veins are patent. Gallbladder/Biliary: Unremarkable. No biliary ductal dilatation. Pancreas: Unremarkable. No main ductal dilatation. Spleen: Similar subcentimeter hypodensities within the posterior medial and  anterior hilar aspect of the spleen statistically favoring benign process. Small  infrarenal round splenule. Adrenal glands: Unremarkable. Kidneys/Ureters: Bilateral urothelial stents coiled within the renal pelvis  disease and bladder with improved hydroureteronephrosis, and improved  enhancement of the left renal parenchyma. Bilateral mild pelviectasis present. No suspicious lesion. Bladder: Diffuse thickening of the partially distended bladder. Reproduction:Postsurgical changes status post hysterectomy with bilateral  salpingo-oophorectomies. Bowel: Postsurgical changes related to prior gastric sleeve. No obstructing mass  or bowel wall thickening. Mesentery/Retroperitoneum/Peritoneum: Increase indeterminate attenuating fluid  measuring up to 24 Hounsfield units centered within the dependent pelvis and  space of Retzius. Additional areas of scattered Mesentery/peroneal stranding and  nodularity that is annotated with arrows on axial series 2. Persistent yet  diminished soft tissue thickening overlying the anterior lower abdominal wall  and adjacent soft tissues. Increase conspicuously of a 7 mm right pelvic  sidewall lymph node (axial image number 98)     Vasculature: Abdominal aorta is normal caliber. Musculoskeletal: No acute or aggressive osseous abnormalities. Impression  1. Nonobstructive pulmonary filling defect involving the subsegmental vessels  of the left lower lobe.      2.  Postsurgical changes from post hysterectomy and bilateral  salpingo-oophorectomies with increased indeterminant attenuating fluid within  the dependent pelvis and Space of Retzius with additional mesentery/peritoneal  nodularity and stranding concerning for carcinomatosis. 3.  Increase conspicuously of a 7 mm right pelvic sidewall lymph node. 4.  Bilateral ureteral stents in place with improved hydroureteronephrosis and  enhancement of the left renal parenchyma. 5.  Persistent diffuse thickening of partially distended bladder which may  represent post treatment changes. 6.  No evidence of metastatic disease to the chest.     [facility] Critical Result: Appropriate actions should be taken. Findings were submitted as critical result to ordering provider at 1:07 PM on  June 22, 2022 by radiologist Dr. Kilo Huff MD.        PET:  PET/CT         Indication: Poorly differentiated ovarian adenocarcinoma       Radiopharmaceutical: 15.05 mCi F18-FDG, intravenously. Technique: Imaging was performed from the skull through the proximal thighs   using routine PET/CT acquisition protocol. Imaging was performed approximately   60 minutes post injection. Oral contrast was administered. Radiation dose   reduction techniques were used for this study:  Our CT scanners use one or all   of the following: Automated exposure control, adjustment of the mA and/or kVp   according to patient's size, iterative reconstruction. Serum glucose: 74 mg/dL prior to injection. Comparison studies: CT abdomen pelvis without contrast 1/27/2022, CT abdomen   pelvis with contrast 1/23/2022       Findings:       Head and Neck: No enlarged or hypermetabolic lymph nodes within the neck. No   worrisome focal FDG uptake in the neck soft tissues. Chest: No lymphadenopathy. No discrete lung nodule. Abdomen/Pelvis: Focal FDG uptake in the left pelvis (axial fused images series   1202, image 230), maximum SUV 12.3.  More inferiorly in the left pelvis are 2 foci of elevated tracer uptake which is thought to reflect activity from   excreted urine within the bladder. Small volume free fluid settling dependently   in the pelvis, which is mildly hypermetabolic, maximum SUV 2.5. Moderate right hydroureteronephrosis. Postsurgical changes from apparent gastric sleeve surgery. Elevated uptake in the right abdominal wall and at the umbilicus. No aggressive bone lesion. Impression   1. Elevated FDG uptake in the left pelvis corresponding with a masslike density   on comparison CT concerning for a peritoneal carcinomatosis. 2.  Small volume free fluid settling dependently within the peritoneal cavity,   where there is also peritoneal thickening and mildly elevated FDG uptake, also   concerning for peritoneal carcinomatosis. 3.  Moderate right hydroureteronephrosis. 4.  Elevated uptake in the ventral abdominal wall related to recent surgery. 5.  No evidence of intrathoracic metastatic disease. (From Dr. Piotr Munoz Op Note 2/1/2022)  FINDINGS:  She had scattered carcinomatosis involving the right diaphragm, left diaphragm and peritoneal cavity. Her left ovary was slightly enlarged versus right, but not clearly malignant. Both tubes and the uterus were surgically absent. There was no evidence of bowel obstruction. She had what appeared to be omental disease at the greater curvature of her stomach. * * *DIAGNOSIS* * * * * * * * * * * * * * * * * * * * * * * * * * * * * * *            A:  \"PERITONEAL NODULE\":             POORLY DIFFERENTIATED METASTATIC CARCINOMA CONSISTENT WITH UPPER                  GASTROINTESTINAL PRIMARY (INCLUDING PANCREATOBILIARY TREE). B:  \"PERITONEAL IMPLANTS\":             POORLY DIFFERENTIATED METASTATIC CARCINOMA CONSISTENT WITH UPPER                  GASTROINTESTINAL PRIMARY (INCLUDING PANCREATOBILIARY TREE). C: \"OMENTUM BIOPSY\":             NO MALIGNANCY IDENTIFIED. (From Dr. Dony Stern Op Note 2/15/2022)  FINDINGS:  Cystoscopy revealed a normal bladder. Laparotomy revealed the peritoneal disease as previously described. The upper abdomen was palpably normal.  She appeared to have disease on the right colon externally at least.  The pelvis was essentially frozen with significant induration and adhesion in the sigmoid area rising out of the pelvis, most consistent with a primary in that location. She appeared to have bilateral fibrosis of the peritoneum extending into the retroperitoneal space. Both adnexae were removed but were not obvious primary sources for her disease. * * *DIAGNOSIS* * * * * * * * * * * * * * * * * * * * * * * * * * * * * * *            A:  \"TROCAR SITE\":             METASTATIC CARCINOMA. B:  \"ABDOMINAL WALL IMPLANT\":             METASTATIC CARCINOMA. INCIDENTAL FOCI OF MATURE OSSEOUS METAPLASIA. C: \"BILATERAL OVARIES\":             FOCUS OF METASTATIC POORLY DIFFERENTIATED CARCINOMA WITH FOCAL                  SIGNET RING FEATURES INVOLVING ONE OVARY. SEE IMMUNOHISTOCHEMISTRY REPORT. Assessment/Plan:  Zain Chester is a 52 y.o. female who has signs and symptoms consistent with peritoneal carcinomatosis without identifiable primary source. It was discussed with the patient that she does have a very concerning process and there is no certainty that it can be treated with tumor debulking and HIPEC. It was discussed the concerns are that she will have a frozen pelvis and ureteral invasion that is unable to be debulked. However, this would be unable to be determined without first exploring her abdomen. If it is found that she has a frozen pelvis and is unable to be debulked the procedure would then be an open and close procedure without HIPEC. If her tumor is able to be debulked, there is also the concern of the unknown primary tumor.  If it is from an upper GI source as suggested from the pathology of her initial diagnostic laparoscopy, then the she may not receive much long-term benefit from the operation as well. The patient and her  wish to be aggressive with her treatment at this time and proceed with attempting tumor debulking and HIPEC. She will need to be off of her chemotherapy for 4 weeks prior to her operation, and she will need to be off of her Xarelto for 3 days prior to her operation as well. Discussed the patient's condition and treatment options with the patient. Discussed risks of surgery in language the patient could understand. The patient voiced understanding of all this and all questions were answered. Alternatives to surgery were discussed also and risks of the alternatives. The patient requested that we proceed with surgery.           Joaquin Jones MD

## 2022-08-17 NOTE — ANESTHESIA PRE PROCEDURE
Department of Anesthesiology  Preprocedure Note       Name:  Gabe Perez   Age:  52 y.o.  :  1975                                          MRN:  [id number]         Date:  2022      Surgeon: Jessie Mohs):  Lupe Hill MD    Procedure: Procedure(s):  TUMOR DEBULKING AND HIPEC    Medications prior to admission:   Prior to Admission medications    Medication Sig Start Date End Date Taking? Authorizing Provider   apixaban (ELIQUIS) 5 MG TABS tablet Take 1 tablet by mouth in the morning and 1 tablet before bedtime.  22  Jimbo Harrington MD   potassium chloride (KLOR-CON M) 20 MEQ extended release tablet Take 1 tablet by mouth 2 times daily 22   SCOTT Swift - CNP   ergocalciferol (ERGOCALCIFEROL) 1.25 MG (46291 UT) capsule Take 50,000 Units by mouth every 7 days Takes on Thursday  Patient not taking: Reported on 2022 10/19/21   Ar Automatic Reconciliation   lidocaine-prilocaine (EMLA) 2.5-2.5 % cream Apply to port about 45 minutes prior to access  Patient not taking: Reported on 2022 3/4/22   Ar Automatic Reconciliation   metoclopramide (REGLAN) 10 MG tablet Take 10 mg by mouth 4 times daily (before meals and nightly) 3/31/22 6/23/22  Ar Automatic Reconciliation   ondansetron (ZOFRAN) 8 MG tablet Take 8 mg by mouth every 8 hours as needed  Patient not taking: Reported on 2022 3/4/22   Ar Automatic Reconciliation   pantoprazole (PROTONIX) 40 MG tablet 2 times daily 3/8/22   Ar Automatic Reconciliation   valACYclovir (VALTREX) 500 MG tablet Take 500 mg by mouth daily 21   Ar Automatic Reconciliation       Current medications:    Current Facility-Administered Medications   Medication Dose Route Frequency Provider Last Rate Last Admin    lidocaine 1 % injection 1 mL  1 mL IntraDERmal Once PRN Doris Hamilton MD        fentaNYL (SUBLIMAZE) injection 100 mcg  100 mcg IntraVENous PRN Doris Hamilton MD        Or    fentaNYL (SUBLIMAZE) injection 50 mcg 50 mcg IntraVENous PRN Fern Angelo MD        lactated ringers infusion  100 mL/hr IntraVENous Continuous Fern Angelo  mL/hr at 08/17/22 0610 100 mL/hr at 08/17/22 0610    sodium chloride flush 0.9 % injection 5-40 mL  5-40 mL IntraVENous 2 times per day Fern Angelo MD        sodium chloride flush 0.9 % injection 5-40 mL  5-40 mL IntraVENous PRN Fern Angelo MD        0.9 % sodium chloride infusion   IntraVENous PRN Fern Angelo MD        midazolam PF (VERSED) injection 2 mg  2 mg IntraVENous Once PRN Fern Angelo MD        sodium chloride flush 0.9 % injection 5-40 mL  5-40 mL IntraVENous 2 times per day Paulina Palacios MD        sodium chloride flush 0.9 % injection 5-40 mL  5-40 mL IntraVENous PRN Paulina Palacios MD        0.9 % sodium chloride infusion   IntraVENous PRN Paulina Palacios MD        ceFAZolin (ANCEF) 2000 mg in sterile water 20 mL IV syringe  2,000 mg IntraVENous On Call to [address] [place] [address], MD        metronidazole (FLAGYL) 500 mg in 0.9% NaCl 100 mL IVPB premix  500 mg IntraVENous Once Paulina Palacios  mL/hr at 08/17/22 0610 500 mg at 08/17/22 5398       Allergies:  No Known Allergies    Problem List:    Patient Active Problem List   Diagnosis Code    Fibroids D21.9    Paraesophageal hernia K44.9    Menorrhagia with regular cycle N92.0    Papanicolaou smear of cervix with low grade squamous intraepithelial lesion (LGSIL) R87.612    Iron deficiency anemia due to chronic blood loss D50.0    Anemia D64.9    History of weight loss surgery Z98.84    Carcinoma of unknown primary ([address] C80.1    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 Corinne Alosa, nl (see media note), R     Hx of blood clots 2022    per pt \"small clot on lung identified by CT scan\"  CT Scan impression:- Nonobstructive pulmonary filling defect involving Left Lower Lobe     Hypotension     asymptomatic    Obstruction of fallopian tube     per pt has \"1 good tube\"    Peritoneal carcinomatosis ([address]     Weight loss     80lbs weight loss after gastric sleeve       Past Surgical History:        Procedure Laterality Date     SECTION  2009    CYSTOSCOPY Bilateral 2022    CYSTOSCOPY BILATERAL RETROGRADE PYELOGRAM performed by Henrry Whittaker MD at [address] Bilateral 2022    BILATERAL CYSTOSCOPY URETERAL STENT EXCHANGE performed by Henrry Whittaker MD at [address]  2014    gastric sleeve- Choudhari    HYSTERECTOMY (CERVIX STATUS UNKNOWN)      2018    HYSTERECTOMY (CERVIX STATUS UNKNOWN)  2020    [facility] w/ Bilateral salpingectomy and left oophorectomy    IR PORT PLACEMENT EQUAL OR GREATER THAN 5 YEARS  2022    IR PORT PLACEMENT EQUAL OR GREATER THAN 5 YEARS  2022    IR PORT PLACEMENT EQUAL OR GREATER THAN 5 YEARS 2022 [facility]    MYOMECTOMY  age [employer]\"    also \"unblocked her FT\"    TONSILLECTOMY      UPPER GASTROINTESTINAL ENDOSCOPY      with dilation    UROLOGICAL SURGERY Bilateral 03/15/2022    cysto       Social History:    Social History     Tobacco Use    Smoking status: Never    Smokeless tobacco: Never   Substance Use Topics    Alcohol use: Not Currently                                Counseling given: Not Answered      Vital Signs (Current):   Vitals:    22 0600   BP: (!) 152/82   Pulse: 77   Resp: 16   Temp: 98.5 °F (36.9 °C)   TempSrc: Oral   SpO2: 100%   Weight: 112 lb (50.8 kg)   Height: 5' 5.75\" (1.67 m)                                              BP Readings from Last 3 Encounters:   08/17/22 (!) 152/82   08/10/22 131/86   07/18/22 (!) 145/102       NPO Status: Time of last liquid consumption: 2300                        Time of last solid consumption: 2300                        Date of last liquid consumption: 08/16/22                        Date of last solid food consumption: 08/16/22    BMI:   Wt Readings from Last 3 Encounters:   08/17/22 112 lb (50.8 kg)   08/10/22 114 lb 9.6 oz (52 kg)   07/18/22 113 lb (51.3 kg)     Body mass index is 18.22 kg/m². CBC:   Lab Results   Component Value Date/Time    WBC 6.5 08/10/2022 10:39 AM    RBC 3.81 08/10/2022 10:39 AM    HGB 11.2 08/10/2022 10:39 AM    HCT 37.1 08/10/2022 10:39 AM    MCV 97.4 08/10/2022 10:39 AM    RDW 14.0 08/10/2022 10:39 AM     08/10/2022 10:39 AM       CMP:   Lab Results   Component Value Date/Time     08/10/2022 10:39 AM    K 3.9 08/10/2022 10:39 AM     08/10/2022 10:39 AM    CO2 31 08/10/2022 10:39 AM    BUN 9 08/10/2022 10:39 AM    CREATININE 0.52 08/10/2022 10:39 AM    GFRAA >60 08/10/2022 10:39 AM    AGRATIO 1.1 05/12/2022 12:43 PM    LABGLOM >60 08/10/2022 10:39 AM    GLUCOSE 101 08/10/2022 10:39 AM    PROT 7.1 08/10/2022 10:39 AM    CALCIUM 9.2 08/10/2022 10:39 AM    BILITOT 0.4 08/10/2022 10:39 AM    ALKPHOS 75 08/10/2022 10:39 AM    ALKPHOS 77 05/12/2022 12:43 PM    AST 25 08/10/2022 10:39 AM    ALT 21 08/10/2022 10:39 AM       POC Tests: No results for input(s): POCGLU, POCNA, POCK, POCCL, POCBUN, POCHEMO, POCHCT in the last 72 hours.     Coags:   Lab Results   Component Value Date/Time    PROTIME 15.6 08/10/2022 10:39 AM    INR 1.2 08/10/2022 10:39 AM    APTT 23.0 08/10/2022 10:39 AM       HCG (If Applicable): No results found for: PREGTESTUR, PREGSERUM, HCG, HCGQUANT     ABGs: No results found for: PHART, PO2ART, THJ5BED, CPU9OUF, BEART, R8IWJBKE     Type & Screen (If Applicable):  No results found for: LABABO, LABRH    Drug/Infectious Status (If Applicable):  No results found for: HIV, HEPCAB    COVID-19 Screening (If Applicable):   Lab Results   Component Value Date/Time    COVID19 Not Detected 02/11/2022 07:14 AM    COVID19 Performed 02/11/2022 07:14 AM           Anesthesia Evaluation  Patient summary reviewed  Airway: Mallampati: II  TM distance: >3 FB   Neck ROM: full  Mouth opening: > = 3 FB   Dental:          Pulmonary:Negative Pulmonary ROS and normal exam                               Cardiovascular:Negative CV ROS  Exercise tolerance: good (>4 METS),                  ROS comment: Hx of PE on eliquis     Neuro/Psych:   Negative Neuro/Psych ROS              GI/Hepatic/Renal:   (+) GERD: well controlled,           Endo/Other:    (+) blood dyscrasia: anemia:., malignancy/cancer (unknown primary with abdominal mets). Abdominal:             Vascular: negative vascular ROS. Other Findings:           Anesthesia Plan      general     ASA 3       Induction: intravenous. arterial line and central line  MIPS: prophylactic pharmacologic antiemetic agents not administered perioperatively for documented reasons. Anesthetic plan and risks discussed with patient and spouse. Use of blood products discussed with whom consented to blood products. Plan discussed with CRNA.                     Juan Jose Velez MD   8/17/2022

## 2022-08-17 NOTE — PERIOP NOTE
TRANSFER - OUT REPORT:    Verbal report given to [facility] RN on [employer]  being transferred to  for routine post-op       Report consisted of patients Situation, Background, Assessment and   Recommendations(SBAR). Information from the following report(s) Nurse Handoff Report, Adult Overview, Surgery Report, Intake/Output, and MAR was reviewed with the receiving nurse. Lines:   Single Lumen Implantable Port Right Subclavian (Active)   Site Assessment Clean, dry & intact 08/17/22 1134       CVC Quadruple Lumen 08/17/22 Right (Active)   $ Central line insertion $ Yes 08/17/22 0842   Central Line Being Utilized No 08/17/22 1134   Criteria for Appropriate Use Limited/no vessel suitable for conventional peripheral access 08/17/22 1134   Site Assessment Clean, dry & intact 08/17/22 1134   Color/Movement/Sensation Capillary refill less than 3 sec 08/17/22 [address] Connections checked and tightened 08/17/22 1134   Dressing Type Transparent 08/17/22 1134   Dressing Status Old drainage noted 08/17/22 0842       Peripheral IV 08/17/22 Left;Posterior Forearm (Active)   Site Assessment Clean, dry & intact 08/17/22 1134   Line Status Infusing 08/17/22 [address] checked and tightened 08/17/22 1134   Phlebitis Assessment No symptoms 08/17/22 1134   Infiltration Assessment 0 08/17/22 1134   Alcohol Cap Used No 08/17/22 1134   Dressing Status Clean, dry & intact 08/17/22 1134   Dressing Type Transparent 08/17/22 1134   Dressing Intervention New 08/17/22 5337        Opportunity for questions and clarification was provided. Patient transported with:   O2 @ 2 liters    VTE prophylaxis orders have been written for [employer]. Patient and family given floor number and nurses name. Family updated re: pt status after security code verified.

## 2022-08-17 NOTE — ANESTHESIA POSTPROCEDURE EVALUATION
Department of Anesthesiology  Postprocedure Note    Patient: Darin Cristobal  MRN: [id number]  YOB: 1975  Date of evaluation: 8/17/2022      Procedure Summary     Date: 08/17/22 Room / Location:  MAIN OR 02 /  MAIN OR    Anesthesia Start: 0720 Anesthesia Stop: 2897    Procedure: EXPLORATORY LAPAROTOMY/ ENTEROENTEROSTOMY (Abdomen) Diagnosis:       Peritoneal carcinoma ([address]      (Peritoneal carcinoma ([address] [C48.2])    Providers: Jackie Roger MD Responsible Provider: Aleja Boss MD    Anesthesia Type: general ASA Status: 3          Anesthesia Type: No value filed.     Dianne Phase I: Dianne Score: 8    Dianne Phase II:        Anesthesia Post Evaluation    Patient location during evaluation: PACU  Patient participation: complete - patient participated  Level of consciousness: awake and alert  Pain score: 2  Airway patency: patent  Nausea & Vomiting: no nausea and no vomiting  Complications: no  Cardiovascular status: blood pressure returned to baseline  Respiratory status: acceptable  Hydration status: euvolemic  Comments: /74   Pulse 64   Temp 97.1 °F (36.2 °C)   Resp 16   Ht 5' 5.75\" (1.67 m)   Wt 112 lb (50.8 kg)   SpO2 100%   BMI 18.22 kg/m²   Multimodal analgesia pain management approach

## 2022-08-17 NOTE — ANESTHESIA PROCEDURE NOTES
Arterial Line:    An arterial line was placed using surface landmarks, in the OR for the following indication(s): continuous blood pressure monitoring. A 20 gauge (size), 1 and 3/4 inch (length), Arrow (type) catheter was placed, Seldinger technique not used, into the left radial artery, secured by Tegaderm. Anesthesia type: General    Events:  patient tolerated procedure well with no complications.     Additional notes:  Placed by ENMANUEL Rust8/17/2022 7:38 AM8/17/2022 7:40 AM  Performed: Resident/CRNA   Preanesthetic Checklist  Completed: patient identified, IV checked, site marked, risks and benefits discussed, surgical/procedural consents, equipment checked, pre-op evaluation, timeout performed, anesthesia consent given, oxygen available, monitors applied/VS acknowledged, fire risk safety assessment completed and verbalized and blood product R/B/A discussed and consented

## 2022-08-17 NOTE — ANESTHESIA PROCEDURE NOTES
Central Venous Line:    A central venous line was placed using ultrasound guidance and surface landmarks, in the OR for the following indication(s): central venous access, CVP monitoring and vasoactive infusions. 8/17/2022 7:39 AM8/17/2022 7:44 AM    Sterility preparation included the following: hand hygiene performed prior to procedure, maximum sterile barriers used and sterile technique used to drape from head to toe. The patient was placed in Trendelenburg position. The right internal jugular vein was prepped. The site was prepped with Chloraprep. A 7 Fr (size), 12 (length), quad lumen was placed. During the procedure, the following specific steps were taken: target vein identified, needle advanced into vein and blood aspirated and guidewire advanced into vein. Intravenous verification was obtained by ultrasound, venous blood return and manometry. Post insertion care included: all ports aspirated, all ports flushed easily, guidewire removed intact, Biopatch applied, line sutured in place and dressing applied. During the procedure the patient experienced: EBL < 5mL. Outcomes: uncomplicated and patient tolerated procedure well  Real-time US image taken/store: yes  Anesthesia type: general    Additional notes:  Vein accessed on first stick with real time ultrasound guidance. Guidewire visualized in vein on ultrasound. Easy catheter insertion. Chest x-ray to be performed in ICU. Seven step protocol followed.   Staffing  Performed: Anesthesiologist   Anesthesiologist: Kali Long MD  Preanesthetic Checklist  Completed: patient identified, IV checked, site marked, risks and benefits discussed, surgical/procedural consents, equipment checked, pre-op evaluation, timeout performed, anesthesia consent given, oxygen available, monitors applied/VS acknowledged, fire risk safety assessment completed and verbalized and blood product R/B/A discussed and consented

## 2022-08-17 NOTE — OP NOTE
[facility]  OPERATIVE REPORT    Name:  Anibal Hannah  MR#:  [id number]  :  1975  ACCOUNT #:  [de-identified]  DATE OF SERVICE:  2022    PREOPERATIVE DIAGNOSIS:  Peritoneal carcinomatosis. POSTOPERATIVE DIAGNOSIS:  Peritoneal carcinomatosis. PROCEDURE PERFORMED:  Exploratory laparotomy with enteroenterostomy. SURGEON:  Alfredo Eisenberg MD.    ASSISTANT:  Milly Schwab, PA    ANESTHESIA:  general    COMPLICATIONS:  none    SPECIMENS REMOVED:  none    IMPLANTS:  none    ESTIMATED BLOOD LOSS:  Less than 50 mL. INDICATION:  This is a 51-year-old female presented with peritoneal carcinomatosis. The primary source is unknown. Her immunostaining suggests this could be upper GI in origin. She had chemotherapy and she was also explored by gynecologist oncologist.  She eventually referred to me for possible HIPEC. I studied her film and histories extensively. I felt several difficulty for tumor debulking, however, this has to be decided intraoperatively and especially she is young, she is running out of options. I felt she deserved the best chance. She fully understands debulking surgery may not be feasible and she understands this is going to be intraoperative decision. She wanted to take the risk and to give a try. She is approved by her insurance for HIPEC procedure. FINDINGS:  1. She does have diffuse peritoneal carcinomatosis. 2.  Her small bowel mesentery is diffusely involved and the entire colon and rectum is also diffusely involved, so is stomach and the bilateral diaphragm. 3.  Her pelvic organ and peritoneum are covered with peritoneal disease. 4.  A loop of small intestine were invaded by cancer along with anterior abdominal wall is pending for obstructions and this is bypassed by enteroenterostomy.   5.  Overall, her PCI, peritoneal cancer index, is extremely high and also her cancer shows the feature of invasive growth pattern consistent with a poorly differentiated upper GI cancer, debulking surgery is not possible, so we had to give up HIPEC. PROCEDURE:  After informed consent was obtained, the patient brought into the operating room, left in supine position. General anesthesia was administered. The patient's abdomen was prepped and draped in routine fashion. We opened her lower midline incision and with careful dissection with both sharp knife and Bovie, we got through the anterior rectus sheath and then we encountered the rectus muscles and then we continued dissection through the posterior rectus sheath and then we encountered very dense tissues concerning for cancer all scarring. So, I had to extend the incision above the umbilicus and this is relatively virgin area and we then again identified the midline fascia and then we were able to get into the peritoneal cavity at the supraumbilical area and then I was able to feel the incision below and for sure this is completely invaded by cancer. Also, a piece of small bowel loop invaded into the incision and so we skipped dissection on the right side. With direct palpation and visualization, we were able to avoid the intestine and then the lower midline incision was opened completely all the way to the pubis symphysis. She has at least moderate amount of ascites and then we were able to run the intestines and then the mid portion of the intestine was invaded by tumor and then densely stuck on the anterior abdominal wall. Proximal to this, the intestine was relatively free. However, the mesentery was studded with extensive implants. The implants were very invasive, many of them right on the border of mesentery and the small intestine. This made the debulking surgery impossible without bowel resection. We then looked at the entire mesentery of small bowel. To complete debulk, this would require extensive small bowel resection, more than 50% of small intestine.     On further exploration of pelvis to see the pelvic organ covered with peritoneal disease, although I would not characterize this as a frozen pelvis. The rectum also was covered with extensive peritoneal disease and then I were able to feel the entire left colon and transverse colon. Then, the colonic appendages and the mesentery border are diffusely infiltrated with disease, to complete debulk this, total proctocolectomy would be required. Then, I explored the upper abdomen and then I were able to put my hands in to feel the stomach. Although the stomach itself feels okay; however, on the greater and the lesser curvatures, the cancer diffusely infiltrated and then the diaphragm was studded with small nodules on both sides. So, at this point, I felt that debulking is not feasible. Her peritoneal cancer index is extremely high. I expect it over 30 at a minimum out of the maximum score of 39 and so at this point, we decided not to proceed with debulking surgery and the HIPEC is also not an option. Then, due to the pending obstruction of the midportion of small bowel, I decided to perform a bypass and the small bowel was run several times to confirm both proximal and distal to the obstructions and then a side-to-side anastomosis was performed and this was done with YASIR TA stapler. The staple line was hemostatic. Anastomosis was wide open and then anastomosis was returned to the peritoneal cavity. Surgical field was inspected again and no evidence of bleeding at the end of the case and then the midline fascia was closed with running #1 looped PDS and then as noted, great care was used to avoid suturing of small intestine, which was invaded by cancer. Skin closed with staples. The patient tolerated the procedure well, transferred to recovery room in stable condition. All the instrument count and lap count were correct. Estimated blood loss was less than 50 mL.   As noted, Peggy Jaramillo is the first assistant in this case and offered excellent exposure to make this surgery quicker and safer.       Lou Bates MD      [id number]  D:  08/17/2022 10:51  T:  08/17/2022 18:04  JOB #:  [id number]

## 2022-08-17 NOTE — BRIEF OP NOTE
Brief Postoperative Note      Patient: Blake Sharma  YOB: 1975  MRN: [id number]    Date of Procedure: 8/17/2022    Pre-Op Diagnosis: Peritoneal carcinoma ([address] [C48.2]    Post-Op Diagnosis: Same       Procedure(s):  EXPLORATORY LAPAROTOMY/ ENTEROENTEROSTOMY    Surgeon(s):  Nirmal Bedoya MD    Assistant:  Darci MORROW    Anesthesia: General    Estimated Blood Loss (mL): less than 50     Complications: None    Specimens:   * No specimens in log *    Implants:  * No implants in log *      Drains:   Urinary Catheter Tyler-Temperature (Active)       Findings: 1 diffuse peritoneal carcinomatosis  2 especially small bowel mesentery is diffusely involved, the entire colon rectum is involved, so is stomach and diaphragm  3 pelvic organ and peritoneum are covered with peritoneal disease   4 a loop of small bowel was invaded by cancer along with anterior abdominal wall, it's pending for obstruction, which is bypassed.        Electronically signed by Nirmal Bedoya MD on 8/17/2022 at 9:55 AM

## 2022-08-17 NOTE — ANESTHESIA PROCEDURE NOTES
Airway  Date/Time: 8/17/2022 7:29 AM  Urgency: elective    Airway not difficult    General Information and Staff    Patient location during procedure: OR  Performed: resident/CRNA     Indications and Patient Condition  Indications for airway management: anesthesia  Spontaneous Ventilation: absent  Sedation level: deep  Preoxygenated: yes  Patient position: sniffing  MILS not maintained throughout  Mask difficulty assessment: vent by bag mask    Final Airway Details  Final airway type: endotracheal airway      Successful airway: ETT  Cuffed: yes   Successful intubation technique: direct laryngoscopy  Facilitating devices/methods: intubating stylet  Endotracheal tube insertion site: oral  Blade: Lin  Blade size: #2  ETT size (mm): 8.0  Cormack-Lehane Classification: grade I - full view of glottis  Placement verified by: chest auscultation and capnometry   Measured from: lips  ETT to lips (cm): 22  Number of attempts at approach: 1  Ventilation between attempts: bag mask  Number of other approaches attempted: 0    no

## 2022-08-18 LAB
ANION GAP SERPL CALC-SCNC: ABNORMAL MMOL/L (ref 7–16)
BASOPHILS # BLD: 0 K/UL (ref 0–0.2)
BASOPHILS NFR BLD: 0 % (ref 0–2)
BUN SERPL-MCNC: 8 MG/DL (ref 6–23)
CALCIUM SERPL-MCNC: 8.6 MG/DL (ref 8.3–10.4)
CHLORIDE SERPL-SCNC: 108 MMOL/L (ref 98–107)
CO2 SERPL-SCNC: 29 MMOL/L (ref 21–32)
CREAT SERPL-MCNC: 0.6 MG/DL (ref 0.6–1)
DIFFERENTIAL METHOD BLD: ABNORMAL
EOSINOPHIL # BLD: 0.1 K/UL (ref 0–0.8)
EOSINOPHIL NFR BLD: 2 % (ref 0.5–7.8)
ERYTHROCYTE [DISTWIDTH] IN BLOOD BY AUTOMATED COUNT: 12.9 % (ref 11.9–14.6)
GLUCOSE BLD STRIP.AUTO-MCNC: 100 MG/DL (ref 65–100)
GLUCOSE BLD STRIP.AUTO-MCNC: 119 MG/DL (ref 65–100)
GLUCOSE SERPL-MCNC: 100 MG/DL (ref 65–100)
HCT VFR BLD AUTO: 28.3 % (ref 35.8–46.3)
HGB BLD-MCNC: 8.6 G/DL (ref 11.7–15.4)
IMM GRANULOCYTES # BLD AUTO: 0 K/UL (ref 0–0.5)
IMM GRANULOCYTES NFR BLD AUTO: 0 % (ref 0–5)
LYMPHOCYTES # BLD: 0.9 K/UL (ref 0.5–4.6)
LYMPHOCYTES NFR BLD: 12 % (ref 13–44)
MAGNESIUM SERPL-MCNC: 1.6 MG/DL (ref 1.8–2.4)
MCH RBC QN AUTO: 29.8 PG (ref 26.1–32.9)
MCHC RBC AUTO-ENTMCNC: 30.4 G/DL (ref 31.4–35)
MCV RBC AUTO: 97.9 FL (ref 79.6–97.8)
MONOCYTES # BLD: 0.8 K/UL (ref 0.1–1.3)
MONOCYTES NFR BLD: 10 % (ref 4–12)
NEUTS SEG # BLD: 5.4 K/UL (ref 1.7–8.2)
NEUTS SEG NFR BLD: 76 % (ref 43–78)
NRBC # BLD: 0 K/UL (ref 0–0.2)
PHOSPHATE SERPL-MCNC: 3.6 MG/DL (ref 2.5–4.5)
PLATELET # BLD AUTO: 197 K/UL (ref 150–450)
PMV BLD AUTO: 10.6 FL (ref 9.4–12.3)
POTASSIUM SERPL-SCNC: 3.6 MMOL/L (ref 3.5–5.1)
RBC # BLD AUTO: 2.89 M/UL (ref 4.05–5.2)
SERVICE CMNT-IMP: ABNORMAL
SERVICE CMNT-IMP: NORMAL
SODIUM SERPL-SCNC: 136 MMOL/L (ref 136–145)
WBC # BLD AUTO: 7.2 K/UL (ref 4.3–11.1)

## 2022-08-18 PROCEDURE — 6370000000 HC RX 637 (ALT 250 FOR IP): Performed by: PHYSICIAN ASSISTANT

## 2022-08-18 PROCEDURE — 2580000003 HC RX 258: Performed by: PHYSICIAN ASSISTANT

## 2022-08-18 PROCEDURE — 85025 COMPLETE CBC W/AUTO DIFF WBC: CPT

## 2022-08-18 PROCEDURE — 83735 ASSAY OF MAGNESIUM: CPT

## 2022-08-18 PROCEDURE — 84100 ASSAY OF PHOSPHORUS: CPT

## 2022-08-18 PROCEDURE — 80048 BASIC METABOLIC PNL TOTAL CA: CPT

## 2022-08-18 PROCEDURE — 82962 GLUCOSE BLOOD TEST: CPT

## 2022-08-18 PROCEDURE — 6360000002 HC RX W HCPCS: Performed by: PHYSICIAN ASSISTANT

## 2022-08-18 PROCEDURE — 1100000000 HC RM PRIVATE

## 2022-08-18 PROCEDURE — 2500000003 HC RX 250 WO HCPCS: Performed by: PHYSICIAN ASSISTANT

## 2022-08-18 RX ORDER — GABAPENTIN 300 MG/1
300 CAPSULE ORAL 3 TIMES DAILY
Status: DISCONTINUED | OUTPATIENT
Start: 2022-08-18 | End: 2022-08-21 | Stop reason: HOSPADM

## 2022-08-18 RX ORDER — VALACYCLOVIR HYDROCHLORIDE 500 MG/1
500 TABLET, FILM COATED ORAL DAILY
Status: DISCONTINUED | OUTPATIENT
Start: 2022-08-18 | End: 2022-08-21 | Stop reason: HOSPADM

## 2022-08-18 RX ORDER — PANTOPRAZOLE SODIUM 40 MG/1
40 TABLET, DELAYED RELEASE ORAL
Status: DISCONTINUED | OUTPATIENT
Start: 2022-08-18 | End: 2022-08-21 | Stop reason: HOSPADM

## 2022-08-18 RX ORDER — DOCUSATE SODIUM 100 MG/1
100 CAPSULE, LIQUID FILLED ORAL 2 TIMES DAILY
Status: DISCONTINUED | OUTPATIENT
Start: 2022-08-18 | End: 2022-08-21 | Stop reason: HOSPADM

## 2022-08-18 RX ORDER — METRONIDAZOLE 500 MG/100ML
500 INJECTION, SOLUTION INTRAVENOUS EVERY 8 HOURS
Status: COMPLETED | OUTPATIENT
Start: 2022-08-18 | End: 2022-08-18

## 2022-08-18 RX ADMIN — DOCUSATE SODIUM 100 MG: 100 CAPSULE, LIQUID FILLED ORAL at 22:10

## 2022-08-18 RX ADMIN — GABAPENTIN 300 MG: 300 CAPSULE ORAL at 22:10

## 2022-08-18 RX ADMIN — KETOROLAC TROMETHAMINE 15 MG: 15 INJECTION, SOLUTION INTRAMUSCULAR; INTRAVENOUS at 00:44

## 2022-08-18 RX ADMIN — METRONIDAZOLE 500 MG: 500 INJECTION, SOLUTION INTRAVENOUS at 08:29

## 2022-08-18 RX ADMIN — NALOXEGOL OXALATE 25 MG: 25 TABLET, FILM COATED ORAL at 08:29

## 2022-08-18 RX ADMIN — PANTOPRAZOLE SODIUM 40 MG: 40 TABLET, DELAYED RELEASE ORAL at 08:28

## 2022-08-18 RX ADMIN — GABAPENTIN 300 MG: 300 CAPSULE ORAL at 15:57

## 2022-08-18 RX ADMIN — METRONIDAZOLE 500 MG: 500 INJECTION, SOLUTION INTRAVENOUS at 00:36

## 2022-08-18 RX ADMIN — GABAPENTIN 300 MG: 300 CAPSULE ORAL at 08:28

## 2022-08-18 RX ADMIN — SODIUM CHLORIDE, PRESERVATIVE FREE 10 ML: 5 INJECTION INTRAVENOUS at 22:10

## 2022-08-18 RX ADMIN — HYDROMORPHONE HYDROCHLORIDE 1 MG: 1 INJECTION, SOLUTION INTRAMUSCULAR; INTRAVENOUS; SUBCUTANEOUS at 08:33

## 2022-08-18 RX ADMIN — PANTOPRAZOLE SODIUM 40 MG: 40 TABLET, DELAYED RELEASE ORAL at 15:57

## 2022-08-18 RX ADMIN — CEFAZOLIN SODIUM 2000 MG: 100 INJECTION, POWDER, LYOPHILIZED, FOR SOLUTION INTRAVENOUS at 00:49

## 2022-08-18 RX ADMIN — CEFAZOLIN SODIUM 2000 MG: 100 INJECTION, POWDER, LYOPHILIZED, FOR SOLUTION INTRAVENOUS at 11:22

## 2022-08-18 RX ADMIN — KETOROLAC TROMETHAMINE 15 MG: 15 INJECTION, SOLUTION INTRAMUSCULAR; INTRAVENOUS at 18:45

## 2022-08-18 RX ADMIN — DOCUSATE SODIUM 100 MG: 100 CAPSULE, LIQUID FILLED ORAL at 08:28

## 2022-08-18 RX ADMIN — HYDROMORPHONE HYDROCHLORIDE 1 MG: 1 INJECTION, SOLUTION INTRAMUSCULAR; INTRAVENOUS; SUBCUTANEOUS at 13:56

## 2022-08-18 RX ADMIN — ENOXAPARIN SODIUM 40 MG: 100 INJECTION SUBCUTANEOUS at 22:09

## 2022-08-18 RX ADMIN — VALACYCLOVIR HYDROCHLORIDE 500 MG: 500 TABLET, FILM COATED ORAL at 10:00

## 2022-08-18 ASSESSMENT — PAIN SCALES - GENERAL
PAINLEVEL_OUTOF10: 0
PAINLEVEL_OUTOF10: 6
PAINLEVEL_OUTOF10: 0
PAINLEVEL_OUTOF10: 0

## 2022-08-18 ASSESSMENT — PAIN DESCRIPTION - PAIN TYPE: TYPE: SURGICAL PAIN

## 2022-08-18 ASSESSMENT — PAIN - FUNCTIONAL ASSESSMENT: PAIN_FUNCTIONAL_ASSESSMENT: ACTIVITIES ARE NOT PREVENTED

## 2022-08-18 ASSESSMENT — PAIN DESCRIPTION - FREQUENCY: FREQUENCY: INTERMITTENT

## 2022-08-18 ASSESSMENT — PAIN DESCRIPTION - ORIENTATION: ORIENTATION: RIGHT

## 2022-08-18 ASSESSMENT — PAIN DESCRIPTION - LOCATION: LOCATION: ABDOMEN

## 2022-08-18 ASSESSMENT — PAIN DESCRIPTION - ONSET: ONSET: ON-GOING

## 2022-08-18 ASSESSMENT — PAIN DESCRIPTION - DESCRIPTORS: DESCRIPTORS: ACHING;DISCOMFORT

## 2022-08-18 NOTE — PROGRESS NOTES
Tyler catheter removed. Patient up in the hallways walking around with primary RN. Patient walking well with no issues.  Up in the chair currently watching tv

## 2022-08-18 NOTE — PROGRESS NOTES
Post-op abx orders released & administered  PRN toradol 15mg IV given x's 1 for pt complaint of pain    Shift report given to Rose Bud, oncoming RN       Vitals:    08/17/22 1952 08/17/22 2241 08/18/22 0328 08/18/22 0333   BP: 121/82 120/87  100/74   Pulse: 70 70 81 73   Resp: 18 16  17   Temp: 99.5 °F (37.5 °C) 99 °F (37.2 °C)  99.3 °F (37.4 °C)   TempSrc: Oral Oral  Oral   SpO2: 100% 100% 97% 100%   Weight:       Height:

## 2022-08-18 NOTE — CARE COORDINATION
Chart screened by CM for d/c planning. 51 y/o female pt who has insurance with pharmacy benefits, is employed, and does not have a PCP. Independent with ADLs at baseline. There have been no PT/OT/SLP consults ordered. Pt admitted with Dx of Peritoneal carcinomatosis, PE on Xarelto, and H/O gastric sleeve. On 8/17 pt underwent an exploratory laparotomy/enteroenterostomy. Both Pre and Post-Op Dx: Peritoneal carcinoma. Pt is receiving IV ABX. Pt is on RA. Current d/c plan is for pt to return home when medically stable and cleared by general surgery. [facility] referral made today. CM will continue to follow and remain available if any needs arise. 08/18/22 5549   Service Assessment   Patient Orientation Alert and Oriented;Person;Place; Self   Cognition Alert   History Provided By Patient;Medical Record   Primary Caregiver Self   Support Systems Family Members   Patient's Healthcare Decision Maker is: Legal Next of Itzel 69   PCP Verified by CM No  (No PCP)   Prior Functional Level Independent in ADLs/IADLs   Current Functional Level Independent in ADLs/IADLs   Can patient return to prior living arrangement Yes   Ability to make needs known: Good   Family able to assist with home care needs: Yes   Would you like for me to discuss the discharge plan with any other family members/significant others, and if so, who?  No   Financial Resources Other (Comment)  (BCBS)   Social/Functional History   Lives With Alone   Type of [address] One level   Home Access Level entry   [address]   Ambulation Assistance Independent   Transfer Assistance Independent   Active  Yes   Mode of Transportation Car   Occupation Full time employment   Type of Occupation IVC   Discharge Planning   Type of [address] [place] [address] Prior To Admission None   Potential Assistance Needed N/A   Potential Assistance Purchasing Medications No   Type of Home Care Services None   Patient expects to be discharged to: House   One/Two Story Residence One story   History of falls? 0   Services At/After Discharge   Transition of Care Consult (CM Consult) Discharge Kiran 0730 Discharge None   [place] Resource Information Provided? No   Mode of Transport at Discharge Other (see comment)  (Family)   Confirm Follow Up Transport Family   Condition of Participation: Discharge Planning   The Plan for Transition of Care is related to the following treatment goals: Pt to obtain care to become medically stable and to return with a safe transition. The Patient and/or Patient Representative was provided with a Choice of Provider? Patient   Freedom of Choice list was provided with basic dialogue that supports the patient's individualized plan of care/goals, treatment preferences, and shares the quality data associated with the providers?   Yes

## 2022-08-18 NOTE — PROGRESS NOTES
Admit Date: 2022    POD 1 Day Post-Op    Procedure:  Procedure(s):  EXPLORATORY LAPAROTOMY/ ENTEROENTEROSTOMY    Subjective: The patient is lying comfortably in bed. She states that her pain is well controlled. She is tolerating a clear liquid diet. She denies any nausea or emesis. Objective:       Vitals:    22 2241 22 0328 22 0333 22 0815   BP: 120/87  100/74 103/70   Pulse: 70 81 73 81   Resp:    Temp: 99 °F (37.2 °C)  99.3 °F (37.4 °C) 98.5 °F (36.9 °C)   TempSrc: Oral  Oral Oral   SpO2: 100% 97% 100% 100%   Weight:       Height:           Temp (24hrs), Av.8 °F (37.1 °C), Min:97.8 °F (36.6 °C), Max:99.5 °F (37.5 °C)  . I&O reviewed as documented. Constitutional: Alert, oriented, cooperative patient in no acute distress; appears stated age    Eyes:Sclera are clear. EOMs intact  ENMT: no external lesions gross hearing normal; no obvious neck masses, no ear or lip lesions, nares normal  CV: RRR. Normal perfusion  Resp: No JVD. Breathing is  non-labored; no audible wheezing. GI: soft and non-distended, incisionally tender, no rebound tenderness or guarding. Dressing intact with no active drainage. Musculoskeletal: unremarkable with normal function. No embolic signs or cyanosis.    Neuro:  Oriented; moves all 4; no focal deficits  Psychiatric: normal affect and mood, no memory impairment    Labs:   Recent Results (from the past 24 hour(s))   POCT Glucose    Collection Time: 22  9:28 PM   Result Value Ref Range    POC Glucose 120 (H) 65 - 100 mg/dL    Performed by: Eamon    Basic Metabolic Panel    Collection Time: 22  3:51 AM   Result Value Ref Range    Sodium 136 136 - 145 mmol/L    Potassium 3.6 3.5 - 5.1 mmol/L    Chloride 108 (H) 98 - 107 mmol/L    CO2 29 21 - 32 mmol/L    Anion Gap Cannot be calculated 7 - 16 mmol/L    Glucose 100 65 - 100 mg/dL    BUN 8 6 - 23 MG/DL    Creatinine 0.60 0.6 - 1.0 MG/DL    GFR  >60 >60 ml/min/1.73m2    GFR Non- >60 >60 ml/min/1.73m2    Calcium 8.6 8.3 - 10.4 MG/DL   CBC with Auto Differential    Collection Time: 08/18/22  3:51 AM   Result Value Ref Range    WBC 7.2 4.3 - 11.1 K/uL    RBC 2.89 (L) 4.05 - 5.2 M/uL    Hemoglobin 8.6 (L) 11.7 - 15.4 g/dL    Hematocrit 28.3 (L) 35.8 - 46.3 %    MCV 97.9 (H) 79.6 - 97.8 FL    MCH 29.8 26.1 - 32.9 PG    MCHC 30.4 (L) 31.4 - 35.0 g/dL    RDW 12.9 11.9 - 14.6 %    Platelets 550 529 - 059 K/uL    MPV 10.6 9.4 - 12.3 FL    nRBC 0.00 0.0 - 0.2 K/uL    Differential Type AUTOMATED      Seg Neutrophils 76 43 - 78 %    Lymphocytes 12 (L) 13 - 44 %    Monocytes 10 4.0 - 12.0 %    Eosinophils % 2 0.5 - 7.8 %    Basophils 0 0.0 - 2.0 %    Immature Granulocytes 0 0.0 - 5.0 %    Segs Absolute 5.4 1.7 - 8.2 K/UL    Absolute Lymph # 0.9 0.5 - 4.6 K/UL    Absolute Mono # 0.8 0.1 - 1.3 K/UL    Absolute Eos # 0.1 0.0 - 0.8 K/UL    Basophils Absolute 0.0 0.0 - 0.2 K/UL    Absolute Immature Granulocyte 0.0 0.0 - 0.5 K/UL   Magnesium    Collection Time: 08/18/22  3:51 AM   Result Value Ref Range    Magnesium 1.6 (L) 1.8 - 2.4 mg/dL   Phosphorus    Collection Time: 08/18/22  3:51 AM   Result Value Ref Range    Phosphorus 3.6 2.5 - 4.5 MG/DL   POCT Glucose    Collection Time: 08/18/22  7:57 AM   Result Value Ref Range    POC Glucose 100 65 - 100 mg/dL    Performed by: Brad            Assessment:     Principal Problem:    Peritoneal carcinoma ([facility] [address]  Active Problems:    Peritoneal carcinomatosis ([facility] [address]  Resolved Problems:    * No resolved hospital problems.  *        Plan/Recommendations/Medical Decision Making:     Awaiting return of bowel function, will advance diet when she shows evidence of bowel function  Continue clear liquid diet at this time  Encouraged ambulation  DVT prophylaxis, GI prophylaxis    Anup Ellington PA-C

## 2022-08-18 NOTE — PLAN OF CARE
Problem: Pain  Goal: Verbalizes/displays adequate comfort level or baseline comfort level  Outcome: Progressing     PRN pain meds per orders available for pain control

## 2022-08-19 LAB
ANION GAP SERPL CALC-SCNC: 2 MMOL/L (ref 7–16)
BASOPHILS # BLD: 0 K/UL (ref 0–0.2)
BASOPHILS NFR BLD: 0 % (ref 0–2)
BUN SERPL-MCNC: 8 MG/DL (ref 6–23)
CALCIUM SERPL-MCNC: 8.5 MG/DL (ref 8.3–10.4)
CHLORIDE SERPL-SCNC: 105 MMOL/L (ref 98–107)
CO2 SERPL-SCNC: 32 MMOL/L (ref 21–32)
CREAT SERPL-MCNC: 0.5 MG/DL (ref 0.6–1)
DIFFERENTIAL METHOD BLD: ABNORMAL
EOSINOPHIL # BLD: 0.3 K/UL (ref 0–0.8)
EOSINOPHIL NFR BLD: 6 % (ref 0.5–7.8)
ERYTHROCYTE [DISTWIDTH] IN BLOOD BY AUTOMATED COUNT: 13.1 % (ref 11.9–14.6)
GLUCOSE SERPL-MCNC: 79 MG/DL (ref 65–100)
HCT VFR BLD AUTO: 27.5 % (ref 35.8–46.3)
HGB BLD-MCNC: 8.3 G/DL (ref 11.7–15.4)
IMM GRANULOCYTES # BLD AUTO: 0 K/UL (ref 0–0.5)
IMM GRANULOCYTES NFR BLD AUTO: 0 % (ref 0–5)
LYMPHOCYTES # BLD: 1 K/UL (ref 0.5–4.6)
LYMPHOCYTES NFR BLD: 19 % (ref 13–44)
MAGNESIUM SERPL-MCNC: 1.6 MG/DL (ref 1.8–2.4)
MCH RBC QN AUTO: 29.5 PG (ref 26.1–32.9)
MCHC RBC AUTO-ENTMCNC: 30.2 G/DL (ref 31.4–35)
MCV RBC AUTO: 97.9 FL (ref 79.6–97.8)
MONOCYTES # BLD: 0.6 K/UL (ref 0.1–1.3)
MONOCYTES NFR BLD: 10 % (ref 4–12)
NEUTS SEG # BLD: 3.6 K/UL (ref 1.7–8.2)
NEUTS SEG NFR BLD: 65 % (ref 43–78)
NRBC # BLD: 0 K/UL (ref 0–0.2)
PHOSPHATE SERPL-MCNC: 3.5 MG/DL (ref 2.5–4.5)
PLATELET # BLD AUTO: 181 K/UL (ref 150–450)
PMV BLD AUTO: 10.1 FL (ref 9.4–12.3)
POTASSIUM SERPL-SCNC: 3.4 MMOL/L (ref 3.5–5.1)
RBC # BLD AUTO: 2.81 M/UL (ref 4.05–5.2)
SODIUM SERPL-SCNC: 139 MMOL/L (ref 136–145)
WBC # BLD AUTO: 5.6 K/UL (ref 4.3–11.1)

## 2022-08-19 PROCEDURE — 6360000002 HC RX W HCPCS: Performed by: SURGERY

## 2022-08-19 PROCEDURE — 6370000000 HC RX 637 (ALT 250 FOR IP): Performed by: PHYSICIAN ASSISTANT

## 2022-08-19 PROCEDURE — 1100000000 HC RM PRIVATE

## 2022-08-19 PROCEDURE — 85025 COMPLETE CBC W/AUTO DIFF WBC: CPT

## 2022-08-19 PROCEDURE — 80048 BASIC METABOLIC PNL TOTAL CA: CPT

## 2022-08-19 PROCEDURE — 84100 ASSAY OF PHOSPHORUS: CPT

## 2022-08-19 PROCEDURE — 6360000002 HC RX W HCPCS: Performed by: NURSE PRACTITIONER

## 2022-08-19 PROCEDURE — 83735 ASSAY OF MAGNESIUM: CPT

## 2022-08-19 RX ORDER — POTASSIUM CHLORIDE 7.45 MG/ML
10 INJECTION INTRAVENOUS ONCE
Status: COMPLETED | OUTPATIENT
Start: 2022-08-19 | End: 2022-08-19

## 2022-08-19 RX ORDER — POTASSIUM CHLORIDE 7.45 MG/ML
20 INJECTION INTRAVENOUS ONCE
Status: DISCONTINUED | OUTPATIENT
Start: 2022-08-19 | End: 2022-08-19

## 2022-08-19 RX ORDER — MAGNESIUM SULFATE IN WATER 40 MG/ML
2000 INJECTION, SOLUTION INTRAVENOUS ONCE
Status: COMPLETED | OUTPATIENT
Start: 2022-08-19 | End: 2022-08-19

## 2022-08-19 RX ORDER — POTASSIUM CHLORIDE 7.45 MG/ML
10 INJECTION INTRAVENOUS
Status: DISPENSED | OUTPATIENT
Start: 2022-08-19 | End: 2022-08-19

## 2022-08-19 RX ADMIN — GABAPENTIN 300 MG: 300 CAPSULE ORAL at 21:00

## 2022-08-19 RX ADMIN — NALOXEGOL OXALATE 25 MG: 25 TABLET, FILM COATED ORAL at 08:52

## 2022-08-19 RX ADMIN — PANTOPRAZOLE SODIUM 40 MG: 40 TABLET, DELAYED RELEASE ORAL at 17:09

## 2022-08-19 RX ADMIN — POTASSIUM CHLORIDE 10 MEQ: 7.46 INJECTION, SOLUTION INTRAVENOUS at 13:47

## 2022-08-19 RX ADMIN — DOCUSATE SODIUM 100 MG: 100 CAPSULE, LIQUID FILLED ORAL at 08:52

## 2022-08-19 RX ADMIN — MAGNESIUM SULFATE HEPTAHYDRATE 2000 MG: 40 INJECTION, SOLUTION INTRAVENOUS at 11:23

## 2022-08-19 RX ADMIN — POTASSIUM CHLORIDE 10 MEQ: 7.46 INJECTION, SOLUTION INTRAVENOUS at 15:40

## 2022-08-19 RX ADMIN — GABAPENTIN 300 MG: 300 CAPSULE ORAL at 08:52

## 2022-08-19 RX ADMIN — PANTOPRAZOLE SODIUM 40 MG: 40 TABLET, DELAYED RELEASE ORAL at 08:52

## 2022-08-19 RX ADMIN — DOCUSATE SODIUM 100 MG: 100 CAPSULE, LIQUID FILLED ORAL at 21:00

## 2022-08-19 RX ADMIN — VALACYCLOVIR HYDROCHLORIDE 500 MG: 500 TABLET, FILM COATED ORAL at 11:22

## 2022-08-19 ASSESSMENT — PAIN SCALES - GENERAL
PAINLEVEL_OUTOF10: 0
PAINLEVEL_OUTOF10: 0

## 2022-08-19 NOTE — PROGRESS NOTES
's visit with Ms. Emily Murphy and her family and friend. Ms. Emily Murphy shared about her surgery and next steps to see her oncologist. Ms. Emily Murphy is prayerful for healing and she has significant support. I offered spiritual/emotional support including affirmation of arlette & emotions, exploration of coping mechanisms, prayer as requested.       Duncan Stephen 68  Board Certified

## 2022-08-19 NOTE — PROGRESS NOTES
Uneventful shift  Labs per orders  K 3.4 & Mg 1.6 on AM labs  Will defer to day shift RN to address w/ surgery team  This RN spent time @ patients bedside chatting  Pt seemed in good spirits at that time    Shift report given to yulia Alaniz RN    Vitals:    08/18/22 1551 08/18/22 1936 08/18/22 2250 08/19/22 0251   BP: 117/74 108/73 104/75 107/74   Pulse: 76 77 67 64   Resp: 17 18 18 16   Temp: 98.6 °F (37 °C) 99.1 °F (37.3 °C) 98 °F (36.7 °C) 98.2 °F (36.8 °C)   TempSrc: Oral Oral Oral Oral   SpO2: 95% 99% 100% 99%   Weight:       Height:

## 2022-08-19 NOTE — PROGRESS NOTES
Admit Date: 2022    POD 2 Days Post-Op    Procedure:  Procedure(s):  EXPLORATORY LAPAROTOMY/ ENTEROENTEROSTOMY    Subjective: The patient is sitting up in bed. Just finished breakfast - CLD. Tolerating well. No nausea/ vomiting. Pt reports pain is well controlled.  -flatus/-BM. Voiding without difficulty. Has been OOB and walking in aggarwal. AF, NAD. K+ 3.4, Mag 1.6  Objective:       Vitals:    22 1936 22 2250 22 0251 22 0721   BP: 108/73 104/75 107/74 122/83   Pulse: 77 67 64 85   Resp:  16   Temp: 99.1 °F (37.3 °C) 98 °F (36.7 °C) 98.2 °F (36.8 °C) 98.3 °F (36.8 °C)   TempSrc: Oral Oral Oral Oral   SpO2: 99% 100% 99% 98%   Weight:       Height:           Temp (24hrs), Av.6 °F (37 °C), Min:98 °F (36.7 °C), Max:99.3 °F (37.4 °C)  . I&O reviewed as documented. Constitutional: Alert, oriented, cooperative patient in no acute distress; appears stated age    Eyes: Sclera are clear. EOMs intact  ENMT: no external lesions gross hearing normal; no obvious neck masses, no ear or lip lesions, nares normal  CV: RRR. Normal perfusion  Resp: No JVD. Breathing is  non-labored; no audible wheezing. Room air. GI: soft and non-distended, incisionally tender, no rebound tenderness or guarding. Post op Dressing removed. Terrell c/d/I. Musculoskeletal: unremarkable with normal function. No embolic signs or cyanosis.    Neuro:  Oriented; moves all 4; no focal deficits  Psychiatric: normal affect and mood, no memory impairment    Labs:   Recent Results (from the past 24 hour(s))   POCT Glucose    Collection Time: 22 11:36 AM   Result Value Ref Range    POC Glucose 119 (H) 65 - 100 mg/dL    Performed by: Wilma Rios    Basic Metabolic Panel    Collection Time: 22  2:38 AM   Result Value Ref Range    Sodium 139 136 - 145 mmol/L    Potassium 3.4 (L) 3.5 - 5.1 mmol/L    Chloride 105 98 - 107 mmol/L    CO2 32 21 - 32 mmol/L    Anion Gap 2 (L) 7 - 16 mmol/L    Glucose 79 65 - 100 mg/dL    BUN 8 6 - 23 MG/DL    Creatinine 0.50 (L) 0.6 - 1.0 MG/DL    GFR African American >60 >60 ml/min/1.73m2    GFR Non- >60 >60 ml/min/1.73m2    Calcium 8.5 8.3 - 10.4 MG/DL   CBC with Auto Differential    Collection Time: 08/19/22  2:38 AM   Result Value Ref Range    WBC 5.6 4.3 - 11.1 K/uL    RBC 2.81 (L) 4.05 - 5.2 M/uL    Hemoglobin 8.3 (L) 11.7 - 15.4 g/dL    Hematocrit 27.5 (L) 35.8 - 46.3 %    MCV 97.9 (H) 79.6 - 97.8 FL    MCH 29.5 26.1 - 32.9 PG    MCHC 30.2 (L) 31.4 - 35.0 g/dL    RDW 13.1 11.9 - 14.6 %    Platelets 174 476 - 323 K/uL    MPV 10.1 9.4 - 12.3 FL    nRBC 0.00 0.0 - 0.2 K/uL    Differential Type AUTOMATED      Seg Neutrophils 65 43 - 78 %    Lymphocytes 19 13 - 44 %    Monocytes 10 4.0 - 12.0 %    Eosinophils % 6 0.5 - 7.8 %    Basophils 0 0.0 - 2.0 %    Immature Granulocytes 0 0.0 - 5.0 %    Segs Absolute 3.6 1.7 - 8.2 K/UL    Absolute Lymph # 1.0 0.5 - 4.6 K/UL    Absolute Mono # 0.6 0.1 - 1.3 K/UL    Absolute Eos # 0.3 0.0 - 0.8 K/UL    Basophils Absolute 0.0 0.0 - 0.2 K/UL    Absolute Immature Granulocyte 0.0 0.0 - 0.5 K/UL   Magnesium    Collection Time: 08/19/22  2:38 AM   Result Value Ref Range    Magnesium 1.6 (L) 1.8 - 2.4 mg/dL   Phosphorus    Collection Time: 08/19/22  2:38 AM   Result Value Ref Range    Phosphorus 3.5 2.5 - 4.5 MG/DL           Assessment:     Principal Problem:    Peritoneal carcinoma (HCC)  Active Problems:    Peritoneal carcinomatosis ([facility] [address]  Resolved Problems:    * No resolved hospital problems.  *        Plan/Recommendations/Medical Decision Making:     Awaiting return of bowel function, will advance diet when she shows evidence of bowel function  Continue clear liquid diet at this time  Encouraged ambulation  DVT prophylaxis, GI prophylaxis  Follow labs  Replace lytes prn    Giana Brady NP

## 2022-08-20 LAB
ANION GAP SERPL CALC-SCNC: 2 MMOL/L (ref 7–16)
BASOPHILS # BLD: 0 K/UL (ref 0–0.2)
BASOPHILS NFR BLD: 0 % (ref 0–2)
BUN SERPL-MCNC: 5 MG/DL (ref 6–23)
CALCIUM SERPL-MCNC: 8.2 MG/DL (ref 8.3–10.4)
CHLORIDE SERPL-SCNC: 106 MMOL/L (ref 98–107)
CO2 SERPL-SCNC: 33 MMOL/L (ref 21–32)
CREAT SERPL-MCNC: 0.4 MG/DL (ref 0.6–1)
DIFFERENTIAL METHOD BLD: ABNORMAL
EOSINOPHIL # BLD: 0.3 K/UL (ref 0–0.8)
EOSINOPHIL NFR BLD: 7 % (ref 0.5–7.8)
ERYTHROCYTE [DISTWIDTH] IN BLOOD BY AUTOMATED COUNT: 13 % (ref 11.9–14.6)
GLUCOSE SERPL-MCNC: 82 MG/DL (ref 65–100)
HCT VFR BLD AUTO: 27 % (ref 35.8–46.3)
HGB BLD-MCNC: 8.1 G/DL (ref 11.7–15.4)
IMM GRANULOCYTES # BLD AUTO: 0 K/UL (ref 0–0.5)
IMM GRANULOCYTES NFR BLD AUTO: 0 % (ref 0–5)
LYMPHOCYTES # BLD: 1.1 K/UL (ref 0.5–4.6)
LYMPHOCYTES NFR BLD: 23 % (ref 13–44)
MAGNESIUM SERPL-MCNC: 1.9 MG/DL (ref 1.8–2.4)
MCH RBC QN AUTO: 29.2 PG (ref 26.1–32.9)
MCHC RBC AUTO-ENTMCNC: 30 G/DL (ref 31.4–35)
MCV RBC AUTO: 97.5 FL (ref 79.6–97.8)
MONOCYTES # BLD: 0.6 K/UL (ref 0.1–1.3)
MONOCYTES NFR BLD: 12 % (ref 4–12)
NEUTS SEG # BLD: 2.8 K/UL (ref 1.7–8.2)
NEUTS SEG NFR BLD: 58 % (ref 43–78)
NRBC # BLD: 0 K/UL (ref 0–0.2)
PHOSPHATE SERPL-MCNC: 3.6 MG/DL (ref 2.5–4.5)
PLATELET # BLD AUTO: 207 K/UL (ref 150–450)
PMV BLD AUTO: 10.4 FL (ref 9.4–12.3)
POTASSIUM SERPL-SCNC: 3.4 MMOL/L (ref 3.5–5.1)
RBC # BLD AUTO: 2.77 M/UL (ref 4.05–5.2)
SODIUM SERPL-SCNC: 141 MMOL/L (ref 136–145)
WBC # BLD AUTO: 4.9 K/UL (ref 4.3–11.1)

## 2022-08-20 PROCEDURE — 6360000002 HC RX W HCPCS: Performed by: NURSE PRACTITIONER

## 2022-08-20 PROCEDURE — 83735 ASSAY OF MAGNESIUM: CPT

## 2022-08-20 PROCEDURE — 84100 ASSAY OF PHOSPHORUS: CPT

## 2022-08-20 PROCEDURE — 6360000002 HC RX W HCPCS: Performed by: PHYSICIAN ASSISTANT

## 2022-08-20 PROCEDURE — 80048 BASIC METABOLIC PNL TOTAL CA: CPT

## 2022-08-20 PROCEDURE — 85025 COMPLETE CBC W/AUTO DIFF WBC: CPT

## 2022-08-20 PROCEDURE — 2580000003 HC RX 258: Performed by: PHYSICIAN ASSISTANT

## 2022-08-20 PROCEDURE — 1100000000 HC RM PRIVATE

## 2022-08-20 PROCEDURE — 6370000000 HC RX 637 (ALT 250 FOR IP): Performed by: PHYSICIAN ASSISTANT

## 2022-08-20 RX ORDER — POTASSIUM CHLORIDE 7.45 MG/ML
10 INJECTION INTRAVENOUS ONCE
Status: COMPLETED | OUTPATIENT
Start: 2022-08-20 | End: 2022-08-20

## 2022-08-20 RX ADMIN — SODIUM CHLORIDE, PRESERVATIVE FREE 10 ML: 5 INJECTION INTRAVENOUS at 09:20

## 2022-08-20 RX ADMIN — DOCUSATE SODIUM 100 MG: 100 CAPSULE, LIQUID FILLED ORAL at 09:19

## 2022-08-20 RX ADMIN — DOCUSATE SODIUM 100 MG: 100 CAPSULE, LIQUID FILLED ORAL at 21:46

## 2022-08-20 RX ADMIN — SODIUM CHLORIDE, PRESERVATIVE FREE 10 ML: 5 INJECTION INTRAVENOUS at 21:47

## 2022-08-20 RX ADMIN — GABAPENTIN 300 MG: 300 CAPSULE ORAL at 09:18

## 2022-08-20 RX ADMIN — GABAPENTIN 300 MG: 300 CAPSULE ORAL at 14:00

## 2022-08-20 RX ADMIN — NALOXEGOL OXALATE 25 MG: 25 TABLET, FILM COATED ORAL at 09:19

## 2022-08-20 RX ADMIN — PANTOPRAZOLE SODIUM 40 MG: 40 TABLET, DELAYED RELEASE ORAL at 09:20

## 2022-08-20 RX ADMIN — PANTOPRAZOLE SODIUM 40 MG: 40 TABLET, DELAYED RELEASE ORAL at 14:00

## 2022-08-20 RX ADMIN — VALACYCLOVIR HYDROCHLORIDE 500 MG: 500 TABLET, FILM COATED ORAL at 09:20

## 2022-08-20 RX ADMIN — GABAPENTIN 300 MG: 300 CAPSULE ORAL at 21:46

## 2022-08-20 RX ADMIN — POTASSIUM CHLORIDE 10 MEQ: 7.46 INJECTION, SOLUTION INTRAVENOUS at 12:32

## 2022-08-20 RX ADMIN — ENOXAPARIN SODIUM 40 MG: 100 INJECTION SUBCUTANEOUS at 21:46

## 2022-08-20 ASSESSMENT — PAIN SCALES - GENERAL: PAINLEVEL_OUTOF10: 0

## 2022-08-20 NOTE — PROGRESS NOTES
Admit Date: 2022    POD 3 Days Post-Op    Procedure:  Procedure(s):  EXPLORATORY LAPAROTOMY/ ENTEROENTEROSTOMY    Subjective: The patient is sitting up in bed. Tolerating FLD. No nausea/ vomiting. Pt reports pain is well controlled. +flatus/-BM. Voiding without difficulty. Has been OOB and walking in aggarwal. AF, NAD. K+ 3.4, Mag 1.9  Objective:       Vitals:    22 2305 22 0350 22 0709 22 1103   BP: 96/63 106/68 (!) 120/91 102/74   Pulse: 69 72 78 76   Resp: 18    Temp: 98.4 °F (36.9 °C) 98.4 °F (36.9 °C) 98.8 °F (37.1 °C) 98.6 °F (37 °C)   TempSrc: Oral Oral Oral Oral   SpO2: 96% 99% 99% 99%   Weight:       Height:           Temp (24hrs), Av.7 °F (37.1 °C), Min:98.4 °F (36.9 °C), Max:99.1 °F (37.3 °C)  . I&O reviewed as documented. Constitutional: Alert, oriented, cooperative patient in no acute distress; appears stated age    Eyes: Sclera are clear. EOMs intact  ENMT: no external lesions gross hearing normal; no obvious neck masses, no ear or lip lesions, nares normal  CV: RRR. Normal perfusion  Resp: No JVD. Breathing is  non-labored; no audible wheezing. Room air. GI: soft and non-distended, incisionally tender, no rebound tenderness or guarding. Terrell c/d/I. Musculoskeletal: unremarkable with normal function. No embolic signs or cyanosis.    Neuro:  Oriented; moves all 4; no focal deficits  Psychiatric: normal affect and mood, no memory impairment    Labs:   Recent Results (from the past 24 hour(s))   Basic Metabolic Panel    Collection Time: 22  3:33 AM   Result Value Ref Range    Sodium 141 136 - 145 mmol/L    Potassium 3.4 (L) 3.5 - 5.1 mmol/L    Chloride 106 98 - 107 mmol/L    CO2 33 (H) 21 - 32 mmol/L    Anion Gap 2 (L) 7 - 16 mmol/L    Glucose 82 65 - 100 mg/dL    BUN 5 (L) 6 - 23 MG/DL    Creatinine 0.40 (L) 0.6 - 1.0 MG/DL    GFR African American >60 >60 ml/min/1.73m2    GFR Non- >60 >60 ml/min/1.73m2    Calcium 8.2 (L) 8.3 - 10.4 MG/DL   CBC with Auto Differential    Collection Time: 08/20/22  3:33 AM   Result Value Ref Range    WBC 4.9 4.3 - 11.1 K/uL    RBC 2.77 (L) 4.05 - 5.2 M/uL    Hemoglobin 8.1 (L) 11.7 - 15.4 g/dL    Hematocrit 27.0 (L) 35.8 - 46.3 %    MCV 97.5 79.6 - 97.8 FL    MCH 29.2 26.1 - 32.9 PG    MCHC 30.0 (L) 31.4 - 35.0 g/dL    RDW 13.0 11.9 - 14.6 %    Platelets 009 373 - 482 K/uL    MPV 10.4 9.4 - 12.3 FL    nRBC 0.00 0.0 - 0.2 K/uL    Differential Type AUTOMATED      Seg Neutrophils 58 43 - 78 %    Lymphocytes 23 13 - 44 %    Monocytes 12 4.0 - 12.0 %    Eosinophils % 7 0.5 - 7.8 %    Basophils 0 0.0 - 2.0 %    Immature Granulocytes 0 0.0 - 5.0 %    Segs Absolute 2.8 1.7 - 8.2 K/UL    Absolute Lymph # 1.1 0.5 - 4.6 K/UL    Absolute Mono # 0.6 0.1 - 1.3 K/UL    Absolute Eos # 0.3 0.0 - 0.8 K/UL    Basophils Absolute 0.0 0.0 - 0.2 K/UL    Absolute Immature Granulocyte 0.0 0.0 - 0.5 K/UL   Magnesium    Collection Time: 08/20/22  3:33 AM   Result Value Ref Range    Magnesium 1.9 1.8 - 2.4 mg/dL   Phosphorus    Collection Time: 08/20/22  3:33 AM   Result Value Ref Range    Phosphorus 3.6 2.5 - 4.5 MG/DL           Assessment:     Principal Problem:    Peritoneal carcinoma (HCC)  Active Problems:    Peritoneal carcinomatosis (HCC)  Resolved Problems:    * No resolved hospital problems.  *        Plan/Recommendations/Medical Decision Making:     Full Liquids  Encouraged ambulation  DVT prophylaxis, GI prophylaxis  Follow labs  Replace lytes prn  Possibly home in am    Angel Szymanski NP

## 2022-08-20 NOTE — PROGRESS NOTES
Uneventful shift  Pt ambulated in aggarwal w/ family  Reports passing gas, but no bm    Shift report given to Helen Crouch RN    Vitals:    08/19/22 1511 08/19/22 1945 08/19/22 2305 08/20/22 0350   BP: 111/79 99/70 96/63 106/68   Pulse: (!) 103 77 69 72   Resp: 18 19 18 20   Temp: 99.1 °F (37.3 °C) 98.8 °F (37.1 °C) 98.4 °F (36.9 °C) 98.4 °F (36.9 °C)   TempSrc: Oral Oral Oral Oral   SpO2: 100% 98% 96% 99%   Weight:       Height:

## 2022-08-20 NOTE — PROGRESS NOTES
END OF SHIFT SUMMARY:    Additional events this shift:   Potassium replacement given per orders  No BM, but pt reports passing gas  Pt reports no pain, N/V/D  LR rate changed to 50 mL/hr per orders    I/Os:  08/20 0701 - 08/20 1900  In: 7863 [P.O.:120;  I.V.:905]  Out: 400 [Urine:400]    Becki Anders, RN

## 2022-08-21 VITALS
SYSTOLIC BLOOD PRESSURE: 116 MMHG | TEMPERATURE: 98.8 F | HEART RATE: 80 BPM | OXYGEN SATURATION: 99 % | WEIGHT: 118.4 LBS | HEIGHT: 66 IN | DIASTOLIC BLOOD PRESSURE: 80 MMHG | RESPIRATION RATE: 16 BRPM | BODY MASS INDEX: 19.03 KG/M2

## 2022-08-21 LAB
ABO + RH BLD: NORMAL
ANION GAP SERPL CALC-SCNC: 5 MMOL/L (ref 7–16)
BASOPHILS # BLD: 0 K/UL (ref 0–0.2)
BASOPHILS NFR BLD: 0 % (ref 0–2)
BLD PROD TYP BPU: NORMAL
BLD PROD TYP BPU: NORMAL
BLOOD BANK DISPENSE STATUS: NORMAL
BLOOD BANK DISPENSE STATUS: NORMAL
BLOOD GROUP ANTIBODIES SERPL: NORMAL
BPU ID: NORMAL
BPU ID: NORMAL
BUN SERPL-MCNC: 2 MG/DL (ref 6–23)
CALCIUM SERPL-MCNC: 8.3 MG/DL (ref 8.3–10.4)
CHLORIDE SERPL-SCNC: 108 MMOL/L (ref 98–107)
CO2 SERPL-SCNC: 31 MMOL/L (ref 21–32)
CREAT SERPL-MCNC: 0.4 MG/DL (ref 0.6–1)
CROSSMATCH RESULT: NORMAL
CROSSMATCH RESULT: NORMAL
DIFFERENTIAL METHOD BLD: ABNORMAL
EOSINOPHIL # BLD: 0.4 K/UL (ref 0–0.8)
EOSINOPHIL NFR BLD: 7 % (ref 0.5–7.8)
ERYTHROCYTE [DISTWIDTH] IN BLOOD BY AUTOMATED COUNT: 13.2 % (ref 11.9–14.6)
GLUCOSE SERPL-MCNC: 82 MG/DL (ref 65–100)
HCT VFR BLD AUTO: 28.6 % (ref 35.8–46.3)
HGB BLD-MCNC: 8.6 G/DL (ref 11.7–15.4)
IMM GRANULOCYTES # BLD AUTO: 0 K/UL (ref 0–0.5)
IMM GRANULOCYTES NFR BLD AUTO: 0 % (ref 0–5)
LYMPHOCYTES # BLD: 1.3 K/UL (ref 0.5–4.6)
LYMPHOCYTES NFR BLD: 24 % (ref 13–44)
MAGNESIUM SERPL-MCNC: 1.9 MG/DL (ref 1.8–2.4)
MCH RBC QN AUTO: 29.5 PG (ref 26.1–32.9)
MCHC RBC AUTO-ENTMCNC: 30.1 G/DL (ref 31.4–35)
MCV RBC AUTO: 97.9 FL (ref 79.6–97.8)
MONOCYTES # BLD: 0.5 K/UL (ref 0.1–1.3)
MONOCYTES NFR BLD: 10 % (ref 4–12)
NEUTS SEG # BLD: 3 K/UL (ref 1.7–8.2)
NEUTS SEG NFR BLD: 59 % (ref 43–78)
NRBC # BLD: 0 K/UL (ref 0–0.2)
PHOSPHATE SERPL-MCNC: 3.7 MG/DL (ref 2.5–4.5)
PLATELET # BLD AUTO: 227 K/UL (ref 150–450)
PMV BLD AUTO: 10 FL (ref 9.4–12.3)
POTASSIUM SERPL-SCNC: 3.5 MMOL/L (ref 3.5–5.1)
RBC # BLD AUTO: 2.92 M/UL (ref 4.05–5.2)
SODIUM SERPL-SCNC: 144 MMOL/L (ref 136–145)
SPECIMEN EXP DATE BLD: NORMAL
UNIT DIVISION: 0
UNIT DIVISION: 0
WBC # BLD AUTO: 5.1 K/UL (ref 4.3–11.1)

## 2022-08-21 PROCEDURE — 2580000003 HC RX 258: Performed by: PHYSICIAN ASSISTANT

## 2022-08-21 PROCEDURE — 83735 ASSAY OF MAGNESIUM: CPT

## 2022-08-21 PROCEDURE — 6370000000 HC RX 637 (ALT 250 FOR IP): Performed by: SURGERY

## 2022-08-21 PROCEDURE — 84100 ASSAY OF PHOSPHORUS: CPT

## 2022-08-21 PROCEDURE — 85025 COMPLETE CBC W/AUTO DIFF WBC: CPT

## 2022-08-21 PROCEDURE — 80048 BASIC METABOLIC PNL TOTAL CA: CPT

## 2022-08-21 PROCEDURE — 6370000000 HC RX 637 (ALT 250 FOR IP): Performed by: PHYSICIAN ASSISTANT

## 2022-08-21 RX ORDER — PSEUDOEPHEDRINE HCL 30 MG
100 TABLET ORAL 2 TIMES DAILY
Status: ON HOLD | COMMUNITY
Start: 2022-08-21 | End: 2022-10-26 | Stop reason: HOSPADM

## 2022-08-21 RX ORDER — BISACODYL 10 MG
10 SUPPOSITORY, RECTAL RECTAL DAILY
Status: DISCONTINUED | OUTPATIENT
Start: 2022-08-21 | End: 2022-08-21 | Stop reason: HOSPADM

## 2022-08-21 RX ADMIN — NALOXEGOL OXALATE 25 MG: 25 TABLET, FILM COATED ORAL at 08:51

## 2022-08-21 RX ADMIN — PANTOPRAZOLE SODIUM 40 MG: 40 TABLET, DELAYED RELEASE ORAL at 08:51

## 2022-08-21 RX ADMIN — DOCUSATE SODIUM 100 MG: 100 CAPSULE, LIQUID FILLED ORAL at 08:51

## 2022-08-21 RX ADMIN — GABAPENTIN 300 MG: 300 CAPSULE ORAL at 08:51

## 2022-08-21 RX ADMIN — MAGNESIUM HYDROXIDE 30 ML: 2400 SUSPENSION ORAL at 10:45

## 2022-08-21 RX ADMIN — BISACODYL 10 MG: 10 SUPPOSITORY RECTAL at 10:45

## 2022-08-21 RX ADMIN — VALACYCLOVIR HYDROCHLORIDE 500 MG: 500 TABLET, FILM COATED ORAL at 08:52

## 2022-08-21 RX ADMIN — SODIUM CHLORIDE, PRESERVATIVE FREE 10 ML: 5 INJECTION INTRAVENOUS at 08:52

## 2022-08-21 ASSESSMENT — PAIN SCALES - GENERAL: PAINLEVEL_OUTOF10: 0

## 2022-08-21 NOTE — CARE COORDINATION
Patient will be d/c home today. Patient has no needs identified at being d/c home today. Family will transport home. Patient has met all treatment goals / milestones. CM will continue to monitor and remain available for any needs that may occur. 08/18/22 2079   Service Assessment   Patient Orientation Alert and Oriented;Person;Place; Self   Cognition Alert   History Provided By Patient;Medical Record   Primary Caregiver Self   Support Systems Family Members   Patient's Healthcare Decision Maker is: Legal Next of Itzel 69   PCP Verified by CM No  (No PCP)   Prior Functional Level Independent in ADLs/IADLs   Current Functional Level Independent in ADLs/IADLs   Can patient return to prior living arrangement Yes   Ability to make needs known: Good   Family able to assist with home care needs: Yes   Would you like for me to discuss the discharge plan with any other family members/significant others, and if so, who? No   Financial Resources Other (Comment)  (BCBS)   Social/Functional History   Lives With Alone   Type of [address] One level   Home Access Level entry   [address]   Ambulation Assistance Independent   Transfer Assistance Independent   Active  Yes   Mode of Transportation Car   Occupation Full time employment   Type of Occupation [employer]   Discharge Planning   Type of [facility] Prior To Admission None   Potential Assistance Needed N/A   Potential Assistance Purchasing Medications No   Type of [address] None   Patient expects to be discharged to: [facility] One story   History of falls? 0   Services At/After Discharge   Transition of Care Consult (CM Consult) Discharge Kir[address] 1690 Discharge None    Resource Information Provided?  No   Mode of Transport at Discharge

## 2022-08-21 NOTE — PROGRESS NOTES
Admit Date: 2022    POD 4 Days Post-Op    Procedure:  Procedure(s):  EXPLORATORY LAPAROTOMY/ ENTEROENTEROSTOMY    Subjective: The patient is up to bathroom. Tolerating FLD. No nausea/ vomiting. Pt reports pain is well controlled. +flatus/+BM. Voiding without difficulty. Has been OOB and walking in aggarwal. AF, NAD. K+ 3.5, Mag 1.9  Objective:       Vitals:    22 0411 22 0504 22 0723 22 1100   BP: 93/74  104/72 116/80   Pulse: 71  74 80   Resp: 16  16 16   Temp: 98.1 °F (36.7 °C)  98.3 °F (36.8 °C) 98.8 °F (37.1 °C)   TempSrc: Oral  Oral Oral   SpO2: 99%  100% 99%   Weight:  118 lb 6.4 oz (53.7 kg)     Height:           Temp (24hrs), Av.6 °F (37 °C), Min:98.1 °F (36.7 °C), Max:99.4 °F (37.4 °C)  . I&O reviewed as documented. Constitutional: Alert, oriented, cooperative patient in no acute distress; appears stated age    Eyes: Sclera are clear. EOMs intact  ENMT: no external lesions gross hearing normal; no obvious neck masses, no ear or lip lesions, nares normal  CV: RRR. Normal perfusion  Resp: No JVD. Breathing is  non-labored; no audible wheezing. Room air. GI: soft and non-distended, incisionally tender, no rebound tenderness or guarding. Jal c/d/I. Musculoskeletal: unremarkable with normal function. No embolic signs or cyanosis.    Neuro:  Oriented; moves all 4; no focal deficits  Psychiatric: normal affect and mood, no memory impairment    Labs:   Recent Results (from the past 24 hour(s))   Basic Metabolic Panel    Collection Time: 22  3:36 AM   Result Value Ref Range    Sodium 144 136 - 145 mmol/L    Potassium 3.5 3.5 - 5.1 mmol/L    Chloride 108 (H) 98 - 107 mmol/L    CO2 31 21 - 32 mmol/L    Anion Gap 5 (L) 7 - 16 mmol/L    Glucose 82 65 - 100 mg/dL    BUN 2 (L) 6 - 23 MG/DL    Creatinine 0.40 (L) 0.6 - 1.0 MG/DL    GFR African American >60 >60 ml/min/1.73m2    GFR Non- >60 >60 ml/min/1.73m2    Calcium 8.3 8.3 - 10.4 MG/DL   CBC with Auto Differential    Collection Time: 08/21/22  3:36 AM   Result Value Ref Range    WBC 5.1 4.3 - 11.1 K/uL    RBC 2.92 (L) 4.05 - 5.2 M/uL    Hemoglobin 8.6 (L) 11.7 - 15.4 g/dL    Hematocrit 28.6 (L) 35.8 - 46.3 %    MCV 97.9 (H) 79.6 - 97.8 FL    MCH 29.5 26.1 - 32.9 PG    MCHC 30.1 (L) 31.4 - 35.0 g/dL    RDW 13.2 11.9 - 14.6 %    Platelets 696 490 - 078 K/uL    MPV 10.0 9.4 - 12.3 FL    nRBC 0.00 0.0 - 0.2 K/uL    Differential Type AUTOMATED      Seg Neutrophils 59 43 - 78 %    Lymphocytes 24 13 - 44 %    Monocytes 10 4.0 - 12.0 %    Eosinophils % 7 0.5 - 7.8 %    Basophils 0 0.0 - 2.0 %    Immature Granulocytes 0 0.0 - 5.0 %    Segs Absolute 3.0 1.7 - 8.2 K/UL    Absolute Lymph # 1.3 0.5 - 4.6 K/UL    Absolute Mono # 0.5 0.1 - 1.3 K/UL    Absolute Eos # 0.4 0.0 - 0.8 K/UL    Basophils Absolute 0.0 0.0 - 0.2 K/UL    Absolute Immature Granulocyte 0.0 0.0 - 0.5 K/UL   Magnesium    Collection Time: 08/21/22  3:36 AM   Result Value Ref Range    Magnesium 1.9 1.8 - 2.4 mg/dL   Phosphorus    Collection Time: 08/21/22  3:36 AM   Result Value Ref Range    Phosphorus 3.7 2.5 - 4.5 MG/DL           Assessment:     Principal Problem:    Peritoneal carcinoma (HCC)  Active Problems:    Peritoneal carcinomatosis (HCC)  Resolved Problems:    * No resolved hospital problems. *        Plan/Recommendations/Medical Decision Making:     Diet as Tolerated  Encouraged ambulation  DVT prophylaxis, GI prophylaxis  Follow labs  Replace lytes prn  DC home  F/u in office with  Dr. Anna Andre in 10 days.     Moncho Tyler NP

## 2022-08-21 NOTE — DISCHARGE INSTRUCTIONS
Discharge Instructions/Follow-up Plans:   MD Instructions: Follow-up with Dr. Anna Andre in 10 days. Call office on Monday for appt time  Keep incisions clean and dry, may remain uncovered. Do not apply lotions, creams or ointments to incisions. Diet - as tolerated - Soft foods diet. Activity - ambulate - as tolerated - no heavy lifting >10lb. May shower - no tub baths or soaking/submerging. No driving while taking narcotics. Do not drink alcohol while taking narcotics. Resume other home medications. If problems or questions arise, please call our office at [phone].

## 2022-08-21 NOTE — PROGRESS NOTES
Pt discharge is complete at this time. Discharge instructions and follow ups reviewed. Prescriptions reviewed. IJ removed. Opportunity for questions given. Patient is awaiting a ride at this time.

## 2022-08-21 NOTE — DISCHARGE SUMMARY
Physician Discharge Summary     Patient ID:  Sherry Madrigal  [id number]  71 y.o.  1975    Admit date: 8/17/2022    Discharge date: 8/21/22    Admitting Physician: Huma Grover MD     Discharge Physician: Dr. King Schmitt    Admission Diagnoses: Peritoneal carcinoma [facility]) [C48.2]  Peritoneal carcinomatosis [facility]) [C78.6]    Discharge Diagnoses: Same    Admission Condition: stable    Discharged Condition: stable    Indication for Admission: [facility] Course:   Sherry Madrigal is a 52 y.o. female who presents with peritoneal carcinomatosis. The patient states that in January she began to develop right flank pain and was having difficulty having bowel movements. She was concerned that she had a kidney stone and had a CT scan performed. The CT demonstrated a linear calcific density along the course of the proximal left ureter with associated hydronephrosis. There was also stranding throughout the retroperitoneal soft tissues anterior to the left psoas muscle with pelvic ascites. The patient then underwent a diagnostic laparoscopy with Dr. Marcos Deleon on 2/1/2022 for concern for possible pelvic malignancy. At the time of the diagnostic laparoscopy the patient was found to have scattered carcinomatosis involving the right diaphragm, left diaphragm and peritoneal cavity. Her left ovary was slightly enlarged versus right, but not clearly malignant. Both tubes and the uterus were surgically absent. There was no evidence of bowel obstruction. She had what appeared to be omental disease at the greater curvature of her stomach. 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 The patient then underwent a laparotomy with bilateral salpingo-oophorectomy and cystoscopy with bilateral double-J ureteral stents placed on 2/15/2022 by Dr. Marcos Deleon.  At this time the patient was found to have disease as previously of 39 and so at this point, we decided not to proceed with debulking surgery and the HIPEC is also not an option. Then, due to the pending obstruction of the midportion of small bowel, I decided to perform a bypass. Consults: none    Significant Diagnostic Studies: labs and radiology    Outstanding Order Results       No orders found from 7/19/2022 to 8/18/2022. Treatments: surgery:     Discharge Exam:  See Progress noted dated 8/21/22    Disposition: home    Discharge Instructions/Follow-up Plans:   MD Instructions: Follow-up with Dr. Heidy Calvillo in 10 days. Call office on Monday for appt time  Keep incisions clean and dry, may remain uncovered. Do not apply lotions, creams or ointments to incisions. Diet - as tolerated - Soft foods diet. Activity - ambulate - as tolerated - no heavy lifting >10lb. May shower - no tub baths or soaking/submerging. Pt declined pain medication  Resume other home medications as instructed      If problems or questions arise, please call our office at [phone].                 Signed:  Earnestine Cisneros NP

## 2022-08-21 NOTE — PROGRESS NOTES
Uneventful shift  Pt endorses passing gas, put no bm  Abd soft  Pt ambulated in hallway    Shift report given to yulia Beatty RN    Vitals:    08/20/22 2014 08/20/22 2320 08/21/22 0411 08/21/22 0504   BP: 105/75 108/77 93/74    Pulse: 75 77 71    Resp: 18 18 16    Temp: 99.4 °F (37.4 °C) 98.6 °F (37 °C) 98.1 °F (36.7 °C)    TempSrc: Oral Oral Oral    SpO2: 95% 99% 99%    Weight:    118 lb 6.4 oz (53.7 kg)   Height:

## 2022-08-22 ENCOUNTER — TELEPHONE (OUTPATIENT)
Dept: INTERNAL MEDICINE CLINIC | Facility: CLINIC | Age: 47
End: 2022-08-22

## 2022-08-22 ENCOUNTER — CARE COORDINATION (OUTPATIENT)
Dept: CARE COORDINATION | Facility: CLINIC | Age: 47
End: 2022-08-22

## 2022-08-22 NOTE — TELEPHONE ENCOUNTER
Called patient to schedule TCV appt following discharge from [facility] 08/21/2022. Dx peritoneal Carcinoma. Patient to follow up with Dr Ivan Cavazos in 10 days. Patient states she will contact Dr Ivan Cavazos to schedule appt. Did not want to pursue getting a PCP at this time. Patient did not have questions or concerns.

## 2022-08-22 NOTE — CARE COORDINATION
Care Transitions Outreach Attempt    Call within 2 business days of discharge: Yes   Attempted to reach patient for transitions of care follow up. Unable to reach patient. Patient: Gabe Perez Patient : 1975 MRN: [id number]    Last Discharge [facility]       Date Complaint Diagnosis Description Type Department Provider    22  Peritoneal carcinoma ([facility] [address] . .. Admission (Discharged) Taylor Haywood MD              Was this an external facility discharge? No Discharge Facility: [facility]    Noted following upcoming appointments from discharge chart review:   [facility] follow up appointment(s):   Future Appointments   Date Time Provider Keo Black   2022  4:00 PM Lupe Hill MD [facility] AMB   2022 10:30 AM Sunshine [address] [place] [address]   2022 11:00 AM Jimbo Harrington MD [facility] AMB   2022 11:00 AM Elizabeth Paz MD [facility] AMB     Non-[facility] follow up appointment(s):   Referred to Physician Services for New PCP.

## 2022-08-23 ENCOUNTER — CARE COORDINATION (OUTPATIENT)
Dept: CARE COORDINATION | Facility: CLINIC | Age: 47
End: 2022-08-23

## 2022-08-23 NOTE — CARE COORDINATION
[facility] Transitions Initial Follow Up Call    Call within 2 business days of discharge: Yes    Patient: Marylen Reap Patient : 1975   MRN: [id number]  Reason for Admission: Peritoneal carcinoma/peritoneal carcinomatosis  Discharge Date: 22 RARS: Readmission Risk Score: 11.8      Last Discharge [facility]       Date Complaint Diagnosis Description Type Department Provider    22  Peritoneal carcinoma ([facility] [address] . .. Admission (Discharged) Porter Reid MD             Transitions of Care Initial Call    Was this an external facility discharge? No Discharge Facility: [facility]    Challenges to be reviewed by the provider   Additional needs identified to be addressed with provider: No  none             Method of communication with provider : none    Advance Care Planning:   Does patient have an Advance Directive: decision maker updated. Care Transition Nurse contacted the patient by telephone to perform post hospital discharge assessment. Verified name and  with patient as identifiers. Provided introduction to self, and explanation of the CTN role. CTN reviewed discharge instructions, medical action plan and red flags with patient who verbalized understanding. Patient given an opportunity to ask questions and does not have any further questions or concerns at this time. Were discharge instructions available to patient? Yes. Reviewed appropriate site of care based on symptoms and resources available to patient including: PCP  Specialist  Urgent care clinics  [facility] . The patient agrees to contact the PCP office for questions related to their healthcare. Medication reconciliation was performed with patient, who verbalizes understanding of administration of home medications. Advised obtaining a 90-day supply of all daily and as-needed medications. Was patient discharged with a pulse oximeter? no    CTN provided contact information.  Plan for follow-up call in  days based on severity of symptoms and risk factors. Plan for next call: symptom management        Non-face-to-face services provided:  Obtained and reviewed discharge summary and/or continuity of care documents   Goals Addressed                   This Visit's Progress     Returns to baseline activity level. Patient/Family able to obtain medicine after d/c     Patient/Family able to verbalize medicine changes     Patient/Family are aware and attends follow up appointments s/p d/c.                Care Transitions 24 Hour Call    Schedule Follow Up Appointment with PCP: Completed  Do you have a copy of your discharge instructions?: Yes  Do you have all of your prescriptions and are they filled?: Yes  Have you been contacted by a [address]?: No  Have you scheduled your follow up appointment?: Yes  How are you going to get to your appointment?: Car - family or friend to transport  Do you have support at home?: Alone  Do you feel like you have everything you need to keep you well at home?: Yes  Are you an active caregiver in your home?: No        Follow Up  Future Appointments   Date Time Provider Keo Black   8/30/2022  4:00 PM Rome Moon MD [facility] AMB   9/1/2022 10:30 AM Sunshine [address] [place] [address]   9/1/2022 11:00 AM Simone Bates MD [facility] AMB   11/4/2022 11:00 AM Jason Christie MD [facility] AMB       Marland Fabry, TRI

## 2022-08-29 ENCOUNTER — CARE COORDINATION (OUTPATIENT)
Dept: CARE COORDINATION | Facility: CLINIC | Age: 47
End: 2022-08-29

## 2022-08-29 NOTE — CARE COORDINATION
[facility] Transitions Follow Up Call        Patient: Justin Gauthier  Patient : 1975   MRN: [id number]  Reason for Admission: Peritoneal carcinoma/peritoneal carcinomatosis  Discharge Date: 22 RARS: Readmission Risk Score: 11.8    Care Transitions Follow Up Call    Needs to be reviewed by the provider   Additional needs identified to be addressed with provider: No  none             Method of communication with provider : none      Care Transition Nurse contacted the patient by telephone to follow up after admission on 2022. Verified name and  with patient as identifiers. Addressed changes since last contact:  Patient reports doing fine with no issues at time of call. Physician Services offered new PCP. Patient declined and prefers to follow up with her Oncologist as scheduled 2022  Discussed follow-up appointments. If no appointment was previously scheduled, appointment scheduling offered: Yes. Is follow up appointment scheduled within 7 days of discharge? Patient reports doing fine with no issues at time of call. Physician Services offered new PCP. Patient declined and prefers to follow up with her Oncologist as scheduled 2022  Advance Care Planning:   Does patient have an Advance Directive: decision maker updated. CTN reviewed discharge instructions, medical action plan and red flags with patient and discussed any barriers to care and/or understanding of plan of care after discharge. Discussed appropriate site of care based on symptoms and resources available to patient including: Specialist  Urgent care clinics  [facility] . The patient agrees to contact the PCP office for questions related to their healthcare.      Patients top risk factors for readmission: carcinoma/peritoneal carcinomatosis  Interventions to address risk factors:   Continue taking all medications as ordered  Adequate nutrition and fluid intake  Follow up with oncology as

## 2022-08-30 ENCOUNTER — OFFICE VISIT (OUTPATIENT)
Dept: SURGERY | Age: 47
End: 2022-08-30

## 2022-08-30 VITALS — BODY MASS INDEX: 18.32 KG/M2 | HEIGHT: 66 IN | WEIGHT: 114 LBS

## 2022-08-30 DIAGNOSIS — Z48.89 AFTERCARE FOLLOWING SURGERY: Primary | ICD-10-CM

## 2022-08-30 PROCEDURE — 99024 POSTOP FOLLOW-UP VISIT: CPT | Performed by: SURGERY

## 2022-08-31 ENCOUNTER — CARE COORDINATION (OUTPATIENT)
Dept: CARE COORDINATION | Facility: CLINIC | Age: 47
End: 2022-08-31

## 2022-08-31 DIAGNOSIS — C80.1 CARCINOMA OF UNKNOWN PRIMARY (HCC): Primary | ICD-10-CM

## 2022-08-31 NOTE — PROGRESS NOTES
[facility]  [facility]. [address]  [phone]      SUBJECTIVE: Anthony Guzman is a 52 y.o. female is seen for a routine postop check. She had exploratory laparotomy to attempt tumor debulking and HIPEC, unfortunately her cancer was not debulkable due to extensive mesentery involvement, so HIPEC was not performed. She had small bowel bypass for a pending bowel obstruction. She had uneventful recovery. Today, she reports no problems with the wound or other issues. Activity, diet and bowels are normal. No pain. OBJECTIVE: Appears well. Wound is well healed without complications or infection. ASSESSMENT: normal postoperative course, doing well. Unfortunately, her peritoneal carcinomatosis is not amenable for debulking or HIPEC. PLAN: Staples removed. Follow with oncology and ok to restart chemotherapy. Return PRN.     Lincoln Dahl MD

## 2022-08-31 NOTE — CARE COORDINATION
[facility] Transitions Follow Up Call        Patient: Joselito Guaman  Patient : 1975   MRN: [id number]  Reason for Admission: Peritoneal carcinoma/peritoneal carcinomatosis  Discharge Date: 22 RARS: Readmission Risk Score: 11.8    CTN attempted outreach to patient for [facility] follow up call. Unable to reach patient. Message left with contact information requesting a return call. Will continue to outreach per protocol if no return call received.          Follow Up  Future Appointments   Date Time Provider Keo Black   2022 10:30 AM [address]   2022 11:00 AM Robert Fontanez MD [facility] AMB   2022 11:00 AM Brea Palacios MD [facility] AMB       Lyle Kent RN

## 2022-09-01 ENCOUNTER — OFFICE VISIT (OUTPATIENT)
Dept: ONCOLOGY | Age: 47
End: 2022-09-01
Payer: COMMERCIAL

## 2022-09-01 ENCOUNTER — HOSPITAL ENCOUNTER (OUTPATIENT)
Dept: LAB | Age: 47
Discharge: HOME | End: 2022-09-04
Payer: COMMERCIAL

## 2022-09-01 VITALS
OXYGEN SATURATION: 98 % | TEMPERATURE: 98.8 F | HEIGHT: 64 IN | WEIGHT: 115 LBS | BODY MASS INDEX: 19.63 KG/M2 | DIASTOLIC BLOOD PRESSURE: 83 MMHG | SYSTOLIC BLOOD PRESSURE: 131 MMHG | HEART RATE: 73 BPM | RESPIRATION RATE: 16 BRPM

## 2022-09-01 DIAGNOSIS — C80.1 METASTASIS TO PERITONEUM OF UNKNOWN PRIMARY (HCC): ICD-10-CM

## 2022-09-01 DIAGNOSIS — C80.1 CARCINOMA OF UNKNOWN PRIMARY (HCC): Primary | ICD-10-CM

## 2022-09-01 DIAGNOSIS — C78.6 METASTASIS TO PERITONEUM OF UNKNOWN PRIMARY (HCC): ICD-10-CM

## 2022-09-01 DIAGNOSIS — C80.1 CARCINOMA OF UNKNOWN PRIMARY (HCC): ICD-10-CM

## 2022-09-01 LAB
ALBUMIN SERPL-MCNC: 3.2 G/DL (ref 3.5–5)
ALBUMIN/GLOB SERPL: 0.8 {RATIO} (ref 1.2–3.5)
ALP SERPL-CCNC: 69 U/L (ref 50–136)
ALT SERPL-CCNC: 17 U/L (ref 12–65)
ANION GAP SERPL CALC-SCNC: 4 MMOL/L (ref 4–13)
AST SERPL-CCNC: 17 U/L (ref 15–37)
BASOPHILS # BLD: 0 K/UL (ref 0–0.2)
BASOPHILS NFR BLD: 0 % (ref 0–2)
BILIRUB SERPL-MCNC: 0.3 MG/DL (ref 0.2–1.1)
BUN SERPL-MCNC: 6 MG/DL (ref 6–23)
CALCIUM SERPL-MCNC: 9 MG/DL (ref 8.3–10.4)
CHLORIDE SERPL-SCNC: 102 MMOL/L (ref 101–110)
CO2 SERPL-SCNC: 32 MMOL/L (ref 21–32)
CREAT SERPL-MCNC: 0.7 MG/DL (ref 0.6–1)
DIFFERENTIAL METHOD BLD: ABNORMAL
EOSINOPHIL # BLD: 0.2 K/UL (ref 0–0.8)
EOSINOPHIL NFR BLD: 3 % (ref 0.5–7.8)
ERYTHROCYTE [DISTWIDTH] IN BLOOD BY AUTOMATED COUNT: 13.4 % (ref 11.9–14.6)
GLOBULIN SER CALC-MCNC: 4.2 G/DL (ref 2.3–3.5)
GLUCOSE SERPL-MCNC: 128 MG/DL (ref 65–100)
HCT VFR BLD AUTO: 36.6 % (ref 35.8–46.3)
HGB BLD-MCNC: 11.2 G/DL (ref 11.7–15.4)
IMM GRANULOCYTES # BLD AUTO: 0 K/UL (ref 0–0.5)
IMM GRANULOCYTES NFR BLD AUTO: 0 % (ref 0–5)
LYMPHOCYTES # BLD: 1.2 K/UL (ref 0.5–4.6)
LYMPHOCYTES NFR BLD: 14 % (ref 13–44)
MAGNESIUM SERPL-MCNC: 2 MG/DL (ref 1.8–2.4)
MCH RBC QN AUTO: 29.1 PG (ref 26.1–32.9)
MCHC RBC AUTO-ENTMCNC: 30.6 G/DL (ref 31.4–35)
MCV RBC AUTO: 95.1 FL (ref 79.6–97.8)
MONOCYTES # BLD: 0.5 K/UL (ref 0.1–1.3)
MONOCYTES NFR BLD: 6 % (ref 4–12)
NEUTS SEG # BLD: 6.6 K/UL (ref 1.7–8.2)
NEUTS SEG NFR BLD: 77 % (ref 43–78)
NRBC # BLD: 0 K/UL (ref 0–0.2)
PLATELET # BLD AUTO: 320 K/UL (ref 150–450)
PMV BLD AUTO: 9.1 FL (ref 9.4–12.3)
POTASSIUM SERPL-SCNC: 4 MMOL/L (ref 3.5–5.1)
PROT SERPL-MCNC: 7.4 G/DL (ref 6.3–8.2)
RBC # BLD AUTO: 3.85 M/UL (ref 4.05–5.2)
SODIUM SERPL-SCNC: 138 MMOL/L (ref 136–145)
WBC # BLD AUTO: 8.5 K/UL (ref 4.3–11.1)

## 2022-09-01 PROCEDURE — 80053 COMPREHEN METABOLIC PANEL: CPT

## 2022-09-01 PROCEDURE — 85025 COMPLETE CBC W/AUTO DIFF WBC: CPT

## 2022-09-01 PROCEDURE — 99214 OFFICE O/P EST MOD 30 MIN: CPT | Performed by: INTERNAL MEDICINE

## 2022-09-01 PROCEDURE — 83735 ASSAY OF MAGNESIUM: CPT

## 2022-09-01 PROCEDURE — 36415 COLL VENOUS BLD VENIPUNCTURE: CPT

## 2022-09-01 ASSESSMENT — PATIENT HEALTH QUESTIONNAIRE - PHQ9
SUM OF ALL RESPONSES TO PHQ QUESTIONS 1-9: 0
2. FEELING DOWN, DEPRESSED OR HOPELESS: 0
SUM OF ALL RESPONSES TO PHQ QUESTIONS 1-9: 0

## 2022-09-01 NOTE — PATIENT INSTRUCTIONS
Patient Instructions from Today's Visit    Reason for Visit:  Prechemo visit    Diagnosis Information:  [email or web address]      Plan:  -We will restart Folfox next week but hold Avastin (infusion only next week)    Follow Up:  As scheduled    Recent Lab Results:  Hospital Outpatient Visit on 09/01/2022   Component Date Value Ref Range Status    WBC 09/01/2022 8.5  4.3 - 11.1 K/uL Final    RBC 09/01/2022 3.85 (A) 4.05 - 5.2 M/uL Final    Hemoglobin 09/01/2022 11.2 (A) 11.7 - 15.4 g/dL Final    Hematocrit 09/01/2022 36.6  35.8 - 46.3 % Final    MCV 09/01/2022 95.1  79.6 - 97.8 FL Final    MCH 09/01/2022 29.1  26.1 - 32.9 PG Final    MCHC 09/01/2022 30.6 (A) 31.4 - 35.0 g/dL Final    RDW 09/01/2022 13.4  11.9 - 14.6 % Final    Platelets 85/99/8133 320  150 - 450 K/uL Final    MPV 09/01/2022 9.1 (A) 9.4 - 12.3 FL Final    nRBC 09/01/2022 0.00  0.0 - 0.2 K/uL Final    **Note: Absolute NRBC parameter is now reported with Hemogram**    Seg Neutrophils 09/01/2022 77  43 - 78 % Final    Lymphocytes 09/01/2022 14  13 - 44 % Final    Monocytes 09/01/2022 6  4.0 - 12.0 % Final    Eosinophils % 09/01/2022 3  0.5 - 7.8 % Final    Basophils 09/01/2022 0  0.0 - 2.0 % Final    Immature Granulocytes 09/01/2022 0  0.0 - 5.0 % Final    Segs Absolute 09/01/2022 6.6  1.7 - 8.2 K/UL Final    Absolute Lymph # 09/01/2022 1.2  0.5 - 4.6 K/UL Final    Absolute Mono # 09/01/2022 0.5  0.1 - 1.3 K/UL Final    Absolute Eos # 09/01/2022 0.2  0.0 - 0.8 K/UL Final    Basophils Absolute 09/01/2022 0.0  0.0 - 0.2 K/UL Final    Absolute Immature Granulocyte 09/01/2022 0.0  0.0 - 0.5 K/UL Final    Differential Type 09/01/2022 AUTOMATED    Final    Sodium 09/01/2022 138  136 - 145 mmol/L Final    Potassium 09/01/2022 4.0  3.5 - 5.1 mmol/L Final    Chloride 09/01/2022 102  101 - 110 mmol/L Final    CO2 09/01/2022 32  21 - 32 mmol/L Final    Anion Gap 09/01/2022 4  4 - 13 mmol/L Final    Glucose 09/01/2022 128 (A) 65 - 100 mg/dL Final    BUN 09/01/2022 6  6 - 23 MG/DL Final    Creatinine 09/01/2022 0.70  0.6 - 1.0 MG/DL Final    GFR  09/01/2022 >60  >60 ml/min/1.73m2 Final    GFR Non- 09/01/2022 >60  >60 ml/min/1.73m2 Final    Comment:      Estimated GFR is calculated using the Modification of Diet in Renal Disease (MDRD) Study equation, reported for both  Americans (GFRAA) and non- Americans (GFRNA), and normalized to 1.73m2 body surface area. The physician must decide which value applies to the patient. The MDRD study equation should only be used in individuals age 25 or older. It has not been validated for the following: pregnant women, patients with serious comorbid conditions,or on certain medications, or persons with extremes of body size, muscle mass, or nutritional status. Calcium 09/01/2022 9.0  8.3 - 10.4 MG/DL Final    Total Bilirubin 09/01/2022 0.3  0.2 - 1.1 MG/DL Final    ALT 09/01/2022 17  12 - 65 U/L Final    AST 09/01/2022 17  15 - 37 U/L Final    Alk Phosphatase 09/01/2022 69  50 - 136 U/L Final    Total Protein 09/01/2022 7.4  6.3 - 8.2 g/dL Final    Albumin 09/01/2022 3.2 (A) 3.5 - 5.0 g/dL Final    Globulin 09/01/2022 4.2 (A) 2.3 - 3.5 g/dL Final    Albumin/Globulin Ratio 09/01/2022 0.8 (A) 1.2 - 3.5   Final    Magnesium 09/01/2022 2.0  1.8 - 2.4 mg/dL Final        Treatment Summary has been discussed and given to patient: n/a        -------------------------------------------------------------------------------------------------------------------  Please call our office at [phone] if you have any  of the following symptoms:   Fever of 100.5 or greater  Chills  Shortness of breath  Swelling or pain in one leg    After office hours an answering service is available and will contact a provider for emergencies or if you are experiencing any of the above symptoms.     Patient does express an interest in My Chart.  My Chart log in information explained on the after visit summary printout at the [facility] [address] [facility] [address] desk.     Dev Mendez RN  Nurse Navigator  [address] [facility] [address]  [phone]

## 2022-09-01 NOTE — PROGRESS NOTES
I saw patient today with Dr Tomer Forrest after exp lap on 8-17-22 with Dr Emma Jimenes. Dr Tomer Forrest explained we will restart chemo next week but only FOLFOX. She will have infusion only no OV. (Hold Avastin for now)  Pt denies complaints after surgery at this time. She is ready to re-start chemotherapy.  Navigation is following

## 2022-09-01 NOTE — PROGRESS NOTES
HEMATOLOGY/ONCOLOGY FOLLOWUP  VISIT      Patient Name: Brea Bermudez             Date of Visit: 2022  : 1975  Age:47 y.o. Presenting Complaint:  Brea Bermudez  is seen in follow-up for carcinoma of unknown primary. History of Present Illness:  Ms. Arpan Ramirez was seen for the first time in our office in 2022. She was a woman of previously good health who began noticing left-sided back discomfort in early 2022. This was associated  ultimately with a sense of difficulty swallowing and ultimately she presented to [facility] for evaluation. Her symptoms were felt to potentially be indicative of a bowel obstruction and she was sent for a CT scan. This study, performed on 2022  was notable for a linear calcific density along the course of the proximal left ureter with associated moderate hydronephrosis potentially indicative of an intraureteral calculus. There was also stranding throughout the retroperitoneal soft tissues anterior  to the left psoas muscle with pelvic ascites. There was also wall thickening involving the descending colon. The question of colitis or serosal implants was raised. The patient had undergone a gastric sleeve placement several years prior. She had  also undergone a negative colonoscopy about 3 years prior to these presentations. There was a history of anemia ultimately resulting in the performance of a hysterectomy approximately 3 years ago for menorrhagia. Because of the CT scan findings, she  was ultimately referred to Dr. Danisha Boone for evaluation of a possible pelvic malignancy. On 2022 he performed a laparoscopy and biopsy with evidence of peritoneal carcinomatosis grossly. A \"peritoneal nodule\" and a \"peritoneal implant\"  were both positive for a poorly differentiated metastatic carcinoma potentially consistent with an upper gastrointestinal primary.   An omental biopsy showed no evidence of malignancy. Immunohistochemistries were positive for cytokeratin 7 and negative  for cytokeratin 20. The tumor was weakly positive for CDX2. The only other positive study was MOC-31. Testing through [facility] demonstrated no mutation and ERB-B2. There was no microsatellite instability and no mismatch repair protein deficiencies. There were no targetable lesions. A PET scan was subsequently performed on  showing elevated FDG uptake in the left pelvis corresponding with a masslike density concerning  for peritoneal carcinomatosis. There was a small volume of free fluid within the peritoneal cavity. There was moderate right hydroureteronephrosis. Despite the pathologic findings, there was no evidence of FDG activity in the upper gastrointestinal  tract. CA-125 was slightly elevated at 44. Given the uncertainty of her diagnosis, the patient went back for additional surgery on February 15 at which time she underwent bilateral ureteral stent placement and bilateral salpingo-oophorectomies. Pathology  from that surgery was notable for poorly differentiated carcinoma with occasional signet ring features. Immunohistochemical stains were most consistent with a tumor of the upper gastrointestinal tract. She is  1 para 1 abortus 0. There is no  family history of cancer. She has had no difficulties with vomiting. She still notes that some food traverses her esophagus slowly. She had undergone a prior dilatation without any evidence of cancer. She subsequently began treatment with FOLFOX ultimately  with the addition of bevacizumab in 2022. She returns for follow up. She ultimately received 8 cycles of FOLFOX most of them with Avastin. On , she was taken to surgery for evaluation of debulking and Hypaque. Unfortunately, at the time of surgery her tumor was found to be tightly adherent and impossible to debulk. She is healing nicely now. She has minimal pain.   She recently had the staples removed. She has no signs or symptoms of bowel obstruction. Medications:   Current Outpatient Medications   Medication Sig Dispense Refill    docusate sodium (COLACE, DULCOLAX) 100 MG CAPS Take 100 mg by mouth 2 times daily      potassium chloride (KLOR-CON M) 20 MEQ extended release tablet Take 1 tablet by mouth 2 times daily 60 tablet 1    ergocalciferol (ERGOCALCIFEROL) 1.25 MG (17249 UT) capsule Take 50,000 Units by mouth every 7 days Takes on Thursday      lidocaine-prilocaine (EMLA) 2.5-2.5 % cream Apply to port about 45 minutes prior to access      ondansetron (ZOFRAN) 8 MG tablet Take 8 mg by mouth every 8 hours as needed      pantoprazole (PROTONIX) 40 MG tablet 2 times daily      valACYclovir (VALTREX) 500 MG tablet Take 500 mg by mouth daily      apixaban (ELIQUIS) 5 MG TABS tablet Take 1 tablet by mouth in the morning and 1 tablet before bedtime. 60 tablet 0    metoclopramide (REGLAN) 10 MG tablet Take 10 mg by mouth 4 times daily (before meals and nightly)       No current facility-administered medications for this visit. Allergies:  No Known Allergies    Review of Systems: The Review of Systems is documented in full in the internal medical record. All systems are negative other than for those noted above. Past Medical History:  Documented in electronic medical record    Past Surgical History:  Documented in electronic medical record    Social History:  Documented in electronic medical record    Family History:  Documented in electronic medical record      Physical Examination:  General Appearance: Healthy appearing patient in no acute distress. Vital signs: /83   Pulse 73   Temp 98.8 °F (37.1 °C)   Resp 16   Ht 5' 4\" (1.626 m)   Wt 115 lb (52.2 kg)   SpO2 98%   BMI 19.74 kg/m²     Performance status: ECOG Level: 0  Distress  Screening Score: PHQ-9 Total Score: 0 (9/1/2022 11:22 AM)  Pain Score:   0 - No pain    HEENT: No oral exam.  Neck: Supple. There is no thyromegaly. Lymph nodes: There is no cervical, supraclavicular, axillary or inguinal adenopathy. Breasts: Not examined. Lungs: The lungs are clear to auscultation and percussion. There is no egophony. There is no chest wall tenderness and no  use of accessory respiratory musculature. Heart: There is no jugular venous distention. The rate is normal and rhythm regular. The S1 and S2 are normal and there are no murmurs or rubs. Abdomen: Soft, non-tender, bowel sounds present and normal, no appreciated hepatosplenomegaly. No palpable masses. Low midline scar which is healing well. Skin: No rash, petechiae or ecchymoses. No evidence of malignancy. Extremities: No cyanosis, clubbing or edema. Labs/Imaging:  Lab Results   Component Value Date/Time    WBC 8.5 09/01/2022 11:02 AM    HGB 11.2 09/01/2022 11:02 AM    HCT 36.6 09/01/2022 11:02 AM     09/01/2022 11:02 AM    MCV 95.1 09/01/2022 11:02 AM       Lab Results   Component Value Date/Time     09/01/2022 11:02 AM    K 4.0 09/01/2022 11:02 AM     09/01/2022 11:02 AM    CO2 32 09/01/2022 11:02 AM    BUN 6 09/01/2022 11:02 AM    GFRAA >60 09/01/2022 11:02 AM    GLOB 4.2 09/01/2022 11:02 AM    ALT 17 09/01/2022 11:02 AM       Above results reviewed with patient. ASSESSMENT:   Ms. Torey Boogie has an apparent metastatic carcinoma of unknown primary. Pathologically, the tumor seems to resemble a process arising in the upper gastrointestinal tract. However, any such primary is  not visible on EGD or PET scanning. Dr. Destiny Gerber presumption is that this may have originated from the colon based on its location and behavior. That too would be somewhat inconsistent with the pathology findings. There is no evidence of disease progression on recent CT scanning. There is a 7 mm pelvic sidewall node and some thickening of the bladder but otherwise no definitive masses or progression of disease.   Following her eighth cycle of treatment, she was taken to the operating room for debulking and Hypaque. As is frequently the case, the CT scan under played the amount of disease. The tumor could not be debulked and she was referred back to me. PLAN:  This disease will be difficult to follow conventionally. We may be able to target and monitor cell free DNA. For now I would put her back on FOLFOX and Avastin. She will resume next week. Given her recent surgery, I will likely hold the Avastin this upcoming cycle. This will be disease incredibly difficult to follow because of its limited resolution on CT scan and lack of adequate markers.         RESUSCITATION DIRECTIVES/HOSPICE CARE;  Full Support           [facility]    Oncology and Hematology   [address]  P [phone]  F [phone]  [email or web address]

## 2022-09-06 ENCOUNTER — CARE COORDINATION (OUTPATIENT)
Dept: CARE COORDINATION | Facility: CLINIC | Age: 47
End: 2022-09-06

## 2022-09-06 DIAGNOSIS — C80.1 CARCINOMA OF UNKNOWN PRIMARY (HCC): Primary | ICD-10-CM

## 2022-09-06 NOTE — CARE COORDINATION
[facility] Transitions Follow Up Call        Patient: Parth Rose  Patient : 1975   MRN: [id number]  Reason for Admission: Peritoneal carcinoma/peritoneal carcinomatosis  Discharge Date: 22 RARS: Readmission Risk Score: 11.8    Patient has graduated from the Care Transitions program on 2022. Patient/family has the ability to self-manage at this time. Patient declined referral to the [facility] team for further management. Patient prefers no additional calls as she is engaged with Oncology Navigator. Patient has Care Transition Nurse's contact information for any further questions, concerns, or needs.   Patients upcoming visits:    Future Appointments   Date Time Provider Keo Black   2022  2:30 PM  [address] [facility]   9/10/2022  5:30 PM POD3A [facility]   2022  7:00 AM LAB CK [facility]   2022  7:30 AM SCOTT Velarde NP [facility]   2022  8:00 AM POD3A [facility]   2022  2:30 PM BMT2 [facility]   10/1/2022  4:00 PM SS [facility]   2022 11:00 AM Arely Meza MD [facility] AM[facility]               Care Transitions Subsequent and Final Call    Schedule Follow Up Appointment with PCP: Completed  Subsequent and Final Calls  Do you have any ongoing symptoms?: No  Have your medications changed?: No  Do you have any questions related to your medications?: No  Do you currently have any active services?: No  Do you have any needs or concerns that I can assist you with?: No        Follow Up  Future Appointments   Date Time Provider Keo Black   2022  2:30 PM  [address] [facility]   9/10/2022  5:30 PM POD3A [facility] [address] [place] [address]   2022  7:00 AM LAB CK [facility] [address] [place] [address]   2022  7:30 AM SCOTT Velarde NP [facility] AMB   2022  8:00 AM POD3A [address] [facility]   2022  2:30 PM BMT2 [facility]   10/1/2022  4:00 PM SS [facility]   2022 11:00 AM Arely Meza MD [facility] NAOMI Inman RN

## 2022-09-08 ENCOUNTER — HOSPITAL ENCOUNTER (OUTPATIENT)
Dept: INFUSION THERAPY | Age: 47
Discharge: HOME | End: 2022-09-08
Payer: COMMERCIAL

## 2022-09-08 VITALS
HEART RATE: 110 BPM | DIASTOLIC BLOOD PRESSURE: 96 MMHG | BODY MASS INDEX: 19.53 KG/M2 | WEIGHT: 113.8 LBS | OXYGEN SATURATION: 96 % | SYSTOLIC BLOOD PRESSURE: 142 MMHG | RESPIRATION RATE: 18 BRPM | TEMPERATURE: 98.5 F

## 2022-09-08 DIAGNOSIS — C80.1 CARCINOMA OF UNKNOWN PRIMARY (HCC): Primary | ICD-10-CM

## 2022-09-08 LAB
ALBUMIN SERPL-MCNC: 3 G/DL (ref 3.5–5)
ALBUMIN/GLOB SERPL: 0.7 {RATIO} (ref 1.2–3.5)
ALP SERPL-CCNC: 66 U/L (ref 50–136)
ALT SERPL-CCNC: 12 U/L (ref 12–65)
ANION GAP SERPL CALC-SCNC: 7 MMOL/L (ref 4–13)
AST SERPL-CCNC: 16 U/L (ref 15–37)
BASOPHILS # BLD: 0 K/UL (ref 0–0.2)
BASOPHILS NFR BLD: 0 % (ref 0–2)
BILIRUB SERPL-MCNC: 0.4 MG/DL (ref 0.2–1.1)
BUN SERPL-MCNC: 9 MG/DL (ref 6–23)
CALCIUM SERPL-MCNC: 9.1 MG/DL (ref 8.3–10.4)
CHLORIDE SERPL-SCNC: 101 MMOL/L (ref 101–110)
CO2 SERPL-SCNC: 27 MMOL/L (ref 21–32)
CREAT SERPL-MCNC: 0.9 MG/DL (ref 0.6–1)
DIFFERENTIAL METHOD BLD: ABNORMAL
EOSINOPHIL # BLD: 0.1 K/UL (ref 0–0.8)
EOSINOPHIL NFR BLD: 2 % (ref 0.5–7.8)
ERYTHROCYTE [DISTWIDTH] IN BLOOD BY AUTOMATED COUNT: 13.2 % (ref 11.9–14.6)
GLOBULIN SER CALC-MCNC: 4.3 G/DL (ref 2.3–3.5)
GLUCOSE SERPL-MCNC: 122 MG/DL (ref 65–100)
HCT VFR BLD AUTO: 36.1 % (ref 35.8–46.3)
HGB BLD-MCNC: 11 G/DL (ref 11.7–15.4)
IMM GRANULOCYTES # BLD AUTO: 0 K/UL (ref 0–0.5)
IMM GRANULOCYTES NFR BLD AUTO: 0 % (ref 0–5)
LYMPHOCYTES # BLD: 1.3 K/UL (ref 0.5–4.6)
LYMPHOCYTES NFR BLD: 15 % (ref 13–44)
MAGNESIUM SERPL-MCNC: 2 MG/DL (ref 1.8–2.4)
MCH RBC QN AUTO: 27.9 PG (ref 26.1–32.9)
MCHC RBC AUTO-ENTMCNC: 30.5 G/DL (ref 31.4–35)
MCV RBC AUTO: 91.6 FL (ref 79.6–97.8)
MONOCYTES # BLD: 0.9 K/UL (ref 0.1–1.3)
MONOCYTES NFR BLD: 11 % (ref 4–12)
NEUTS SEG # BLD: 6.2 K/UL (ref 1.7–8.2)
NEUTS SEG NFR BLD: 72 % (ref 43–78)
NRBC # BLD: 0 K/UL (ref 0–0.2)
PLATELET # BLD AUTO: 359 K/UL (ref 150–450)
PMV BLD AUTO: 9.2 FL (ref 9.4–12.3)
POTASSIUM SERPL-SCNC: 4.2 MMOL/L (ref 3.5–5.1)
PROT SERPL-MCNC: 7.3 G/DL (ref 6.3–8.2)
RBC # BLD AUTO: 3.94 M/UL (ref 4.05–5.2)
SODIUM SERPL-SCNC: 135 MMOL/L (ref 136–145)
WBC # BLD AUTO: 8.5 K/UL (ref 4.3–11.1)

## 2022-09-08 PROCEDURE — 96375 TX/PRO/DX INJ NEW DRUG ADDON: CPT

## 2022-09-08 PROCEDURE — 83735 ASSAY OF MAGNESIUM: CPT

## 2022-09-08 PROCEDURE — 85025 COMPLETE CBC W/AUTO DIFF WBC: CPT

## 2022-09-08 PROCEDURE — 96413 CHEMO IV INFUSION 1 HR: CPT

## 2022-09-08 PROCEDURE — 96415 CHEMO IV INFUSION ADDL HR: CPT

## 2022-09-08 PROCEDURE — 96368 THER/DIAG CONCURRENT INF: CPT

## 2022-09-08 PROCEDURE — 80053 COMPREHEN METABOLIC PANEL: CPT

## 2022-09-08 PROCEDURE — G0498 CHEMO EXTEND IV INFUS W/PUMP: HCPCS

## 2022-09-08 PROCEDURE — 96411 CHEMO IV PUSH ADDL DRUG: CPT

## 2022-09-08 PROCEDURE — 6360000002 HC RX W HCPCS: Performed by: INTERNAL MEDICINE

## 2022-09-08 PROCEDURE — 2580000003 HC RX 258: Performed by: INTERNAL MEDICINE

## 2022-09-08 RX ORDER — MEPERIDINE HYDROCHLORIDE 25 MG/ML
12.5 INJECTION INTRAMUSCULAR; INTRAVENOUS; SUBCUTANEOUS PRN
Status: CANCELLED | OUTPATIENT
Start: 2022-09-08

## 2022-09-08 RX ORDER — ONDANSETRON 2 MG/ML
8 INJECTION INTRAMUSCULAR; INTRAVENOUS ONCE
Status: COMPLETED | OUTPATIENT
Start: 2022-09-08 | End: 2022-09-08

## 2022-09-08 RX ORDER — DEXTROSE MONOHYDRATE 50 MG/ML
5-250 INJECTION, SOLUTION INTRAVENOUS PRN
Status: DISCONTINUED | OUTPATIENT
Start: 2022-09-08 | End: 2022-09-09 | Stop reason: HOSPADM

## 2022-09-08 RX ORDER — HEPARIN SODIUM (PORCINE) LOCK FLUSH IV SOLN 100 UNIT/ML 100 UNIT/ML
500 SOLUTION INTRAVENOUS PRN
Status: CANCELLED | OUTPATIENT
Start: 2022-09-10

## 2022-09-08 RX ORDER — SODIUM CHLORIDE 9 MG/ML
5-250 INJECTION, SOLUTION INTRAVENOUS PRN
Status: CANCELLED | OUTPATIENT
Start: 2022-09-08

## 2022-09-08 RX ORDER — HEPARIN SODIUM (PORCINE) LOCK FLUSH IV SOLN 100 UNIT/ML 100 UNIT/ML
500 SOLUTION INTRAVENOUS PRN
Status: CANCELLED | OUTPATIENT
Start: 2022-09-08

## 2022-09-08 RX ORDER — SODIUM CHLORIDE 0.9 % (FLUSH) 0.9 %
5-40 SYRINGE (ML) INJECTION PRN
Status: CANCELLED | OUTPATIENT
Start: 2022-09-10

## 2022-09-08 RX ORDER — ONDANSETRON 2 MG/ML
8 INJECTION INTRAMUSCULAR; INTRAVENOUS
Status: CANCELLED | OUTPATIENT
Start: 2022-09-08

## 2022-09-08 RX ORDER — SODIUM CHLORIDE 9 MG/ML
5-250 INJECTION, SOLUTION INTRAVENOUS PRN
Status: CANCELLED | OUTPATIENT
Start: 2022-09-10

## 2022-09-08 RX ORDER — SODIUM CHLORIDE 0.9 % (FLUSH) 0.9 %
5-40 SYRINGE (ML) INJECTION PRN
Status: DISCONTINUED | OUTPATIENT
Start: 2022-09-08 | End: 2022-09-09 | Stop reason: HOSPADM

## 2022-09-08 RX ORDER — SODIUM CHLORIDE 9 MG/ML
INJECTION, SOLUTION INTRAVENOUS CONTINUOUS
Status: CANCELLED | OUTPATIENT
Start: 2022-09-08

## 2022-09-08 RX ORDER — ALBUTEROL SULFATE 90 UG/1
4 AEROSOL, METERED RESPIRATORY (INHALATION) PRN
Status: CANCELLED | OUTPATIENT
Start: 2022-09-08

## 2022-09-08 RX ORDER — DIPHENHYDRAMINE HYDROCHLORIDE 50 MG/ML
50 INJECTION INTRAMUSCULAR; INTRAVENOUS
Status: CANCELLED | OUTPATIENT
Start: 2022-09-08

## 2022-09-08 RX ORDER — ACETAMINOPHEN 325 MG/1
650 TABLET ORAL
Status: CANCELLED | OUTPATIENT
Start: 2022-09-08

## 2022-09-08 RX ORDER — SODIUM CHLORIDE 9 MG/ML
5-40 INJECTION INTRAVENOUS PRN
Status: CANCELLED | OUTPATIENT
Start: 2022-09-08

## 2022-09-08 RX ORDER — EPINEPHRINE 1 MG/ML
0.3 INJECTION, SOLUTION, CONCENTRATE INTRAVENOUS PRN
Status: CANCELLED | OUTPATIENT
Start: 2022-09-08

## 2022-09-08 RX ORDER — FLUOROURACIL 50 MG/ML
400 INJECTION, SOLUTION INTRAVENOUS ONCE
Status: COMPLETED | OUTPATIENT
Start: 2022-09-08 | End: 2022-09-08

## 2022-09-08 RX ORDER — SODIUM CHLORIDE 9 MG/ML
5-40 INJECTION INTRAVENOUS PRN
Status: CANCELLED | OUTPATIENT
Start: 2022-09-10

## 2022-09-08 RX ADMIN — LEUCOVORIN CALCIUM 650 MG: 350 INJECTION, POWDER, LYOPHILIZED, FOR SOLUTION INTRAMUSCULAR; INTRAVENOUS at 16:24

## 2022-09-08 RX ADMIN — SODIUM CHLORIDE, PRESERVATIVE FREE 10 ML: 5 INJECTION INTRAVENOUS at 15:50

## 2022-09-08 RX ADMIN — DEXAMETHASONE SODIUM PHOSPHATE 12 MG: 4 INJECTION, SOLUTION INTRAMUSCULAR; INTRAVENOUS at 16:03

## 2022-09-08 RX ADMIN — OXALIPLATIN 135 MG: 5 INJECTION, SOLUTION INTRAVENOUS at 16:24

## 2022-09-08 RX ADMIN — FLUOROURACIL 625 MG: 50 INJECTION, SOLUTION INTRAVENOUS at 18:27

## 2022-09-08 RX ADMIN — FLUOROURACIL 3825 MG: 50 INJECTION, SOLUTION INTRAVENOUS at 18:30

## 2022-09-08 RX ADMIN — ONDANSETRON 8 MG: 2 INJECTION INTRAMUSCULAR; INTRAVENOUS at 15:56

## 2022-09-08 RX ADMIN — DEXTROSE MONOHYDRATE 25 ML/HR: 50 INJECTION, SOLUTION INTRAVENOUS at 15:50

## 2022-09-10 ENCOUNTER — HOSPITAL ENCOUNTER (OUTPATIENT)
Dept: INFUSION THERAPY | Age: 47
Discharge: HOME | End: 2022-09-10
Payer: COMMERCIAL

## 2022-09-10 VITALS
OXYGEN SATURATION: 97 % | SYSTOLIC BLOOD PRESSURE: 138 MMHG | DIASTOLIC BLOOD PRESSURE: 94 MMHG | HEART RATE: 113 BPM | TEMPERATURE: 98.4 F | RESPIRATION RATE: 18 BRPM

## 2022-09-10 DIAGNOSIS — C80.1 CARCINOMA OF UNKNOWN PRIMARY (HCC): Primary | ICD-10-CM

## 2022-09-10 PROCEDURE — 96523 IRRIG DRUG DELIVERY DEVICE: CPT

## 2022-09-10 PROCEDURE — A4216 STERILE WATER/SALINE, 10 ML: HCPCS | Performed by: INTERNAL MEDICINE

## 2022-09-10 PROCEDURE — 2580000003 HC RX 258: Performed by: INTERNAL MEDICINE

## 2022-09-10 RX ORDER — SODIUM CHLORIDE 9 MG/ML
5-40 INJECTION INTRAVENOUS PRN
Status: DISCONTINUED | OUTPATIENT
Start: 2022-09-10 | End: 2022-09-11 | Stop reason: HOSPADM

## 2022-09-10 RX ADMIN — SODIUM CHLORIDE 10 ML: 9 INJECTION, SOLUTION INTRAMUSCULAR; INTRAVENOUS; SUBCUTANEOUS at 16:40

## 2022-09-12 ENCOUNTER — APPOINTMENT (OUTPATIENT)
Dept: CT IMAGING | Age: 47
DRG: 374 | End: 2022-09-12
Payer: COMMERCIAL

## 2022-09-12 ENCOUNTER — CLINICAL DOCUMENTATION (OUTPATIENT)
Dept: ONCOLOGY | Age: 47
End: 2022-09-12

## 2022-09-12 ENCOUNTER — HOSPITAL ENCOUNTER (INPATIENT)
Age: 47
LOS: 15 days | Discharge: HOME HEALTH | DRG: 374 | End: 2022-09-27
Attending: EMERGENCY MEDICINE | Admitting: INTERNAL MEDICINE
Payer: COMMERCIAL

## 2022-09-12 DIAGNOSIS — C78.6 PERITONEAL CARCINOMATOSIS (HCC): ICD-10-CM

## 2022-09-12 DIAGNOSIS — R11.2 INTRACTABLE VOMITING WITH NAUSEA, UNSPECIFIED VOMITING TYPE: Primary | ICD-10-CM

## 2022-09-12 DIAGNOSIS — R10.84 GENERALIZED ABDOMINAL PAIN: ICD-10-CM

## 2022-09-12 PROBLEM — K92.0 HEMATEMESIS: Status: ACTIVE | Noted: 2022-09-12

## 2022-09-12 PROBLEM — Z79.01 CURRENT USE OF LONG TERM ANTICOAGULATION: Status: ACTIVE | Noted: 2022-09-12

## 2022-09-12 PROBLEM — C80.1 CARCINOMA OF UNKNOWN PRIMARY (HCC): Status: ACTIVE | Noted: 2022-03-16

## 2022-09-12 LAB
ABO + RH BLD: NORMAL
BASOPHILS # BLD: 0 K/UL (ref 0–0.2)
BASOPHILS NFR BLD: 0 % (ref 0–2)
BLOOD GROUP ANTIBODIES SERPL: NORMAL
BUN BLD-MCNC: 13 MG/DL (ref 8–26)
CHLORIDE BLD-SCNC: 100 MMOL/L (ref 98–107)
CO2 BLD-SCNC: 26.1 MMOL/L (ref 21–32)
CREAT BLD-MCNC: 0.95 MG/DL (ref 0.8–1.5)
DIFFERENTIAL METHOD BLD: ABNORMAL
EOSINOPHIL # BLD: 0 K/UL (ref 0–0.8)
EOSINOPHIL NFR BLD: 0 % (ref 0.5–7.8)
ERYTHROCYTE [DISTWIDTH] IN BLOOD BY AUTOMATED COUNT: 13.5 % (ref 11.9–14.6)
GLUCOSE BLD-MCNC: 131 MG/DL (ref 65–100)
HCT VFR BLD AUTO: 38.7 % (ref 35.8–46.3)
HGB BLD-MCNC: 11.8 G/DL (ref 11.7–15.4)
IMM GRANULOCYTES # BLD AUTO: 0 K/UL (ref 0–0.5)
IMM GRANULOCYTES NFR BLD AUTO: 0 % (ref 0–5)
LIPASE SERPL-CCNC: 43 U/L (ref 73–393)
LYMPHOCYTES # BLD: 0.5 K/UL (ref 0.5–4.6)
LYMPHOCYTES NFR BLD: 5 % (ref 13–44)
MCH RBC QN AUTO: 28.2 PG (ref 26.1–32.9)
MCHC RBC AUTO-ENTMCNC: 30.5 G/DL (ref 31.4–35)
MCV RBC AUTO: 92.6 FL (ref 79.6–97.8)
MONOCYTES # BLD: 0.1 K/UL (ref 0.1–1.3)
MONOCYTES NFR BLD: 1 % (ref 4–12)
NEUTS SEG # BLD: 9.2 K/UL (ref 1.7–8.2)
NEUTS SEG NFR BLD: 93 % (ref 43–78)
NRBC # BLD: 0 K/UL (ref 0–0.2)
PLATELET # BLD AUTO: 410 K/UL (ref 150–450)
PMV BLD AUTO: 10.2 FL (ref 9.4–12.3)
POTASSIUM BLD-SCNC: 3.8 MMOL/L (ref 3.5–5.1)
RBC # BLD AUTO: 4.18 M/UL (ref 4.05–5.2)
SODIUM BLD-SCNC: 138 MMOL/L (ref 136–145)
SPECIMEN EXP DATE BLD: NORMAL
WBC # BLD AUTO: 9.9 K/UL (ref 4.3–11.1)

## 2022-09-12 PROCEDURE — 6370000000 HC RX 637 (ALT 250 FOR IP): Performed by: FAMILY MEDICINE

## 2022-09-12 PROCEDURE — 83690 ASSAY OF LIPASE: CPT

## 2022-09-12 PROCEDURE — 6360000002 HC RX W HCPCS: Performed by: FAMILY MEDICINE

## 2022-09-12 PROCEDURE — 6360000002 HC RX W HCPCS: Performed by: EMERGENCY MEDICINE

## 2022-09-12 PROCEDURE — A4216 STERILE WATER/SALINE, 10 ML: HCPCS | Performed by: EMERGENCY MEDICINE

## 2022-09-12 PROCEDURE — 85025 COMPLETE CBC W/AUTO DIFF WBC: CPT

## 2022-09-12 PROCEDURE — 2580000003 HC RX 258: Performed by: EMERGENCY MEDICINE

## 2022-09-12 PROCEDURE — 86900 BLOOD TYPING SEROLOGIC ABO: CPT

## 2022-09-12 PROCEDURE — 99285 EMERGENCY DEPT VISIT HI MDM: CPT

## 2022-09-12 PROCEDURE — 74177 CT ABD & PELVIS W/CONTRAST: CPT

## 2022-09-12 PROCEDURE — 80047 BASIC METABLC PNL IONIZED CA: CPT

## 2022-09-12 PROCEDURE — 1100000003 HC PRIVATE W/ TELEMETRY

## 2022-09-12 PROCEDURE — 96376 TX/PRO/DX INJ SAME DRUG ADON: CPT

## 2022-09-12 PROCEDURE — 96374 THER/PROPH/DIAG INJ IV PUSH: CPT

## 2022-09-12 PROCEDURE — P9047 ALBUMIN (HUMAN), 25%, 50ML: HCPCS | Performed by: FAMILY MEDICINE

## 2022-09-12 PROCEDURE — 6360000004 HC RX CONTRAST MEDICATION: Performed by: EMERGENCY MEDICINE

## 2022-09-12 PROCEDURE — C9113 INJ PANTOPRAZOLE SODIUM, VIA: HCPCS | Performed by: EMERGENCY MEDICINE

## 2022-09-12 RX ORDER — ONDANSETRON 4 MG/1
4 TABLET, ORALLY DISINTEGRATING ORAL EVERY 8 HOURS PRN
Status: DISCONTINUED | OUTPATIENT
Start: 2022-09-12 | End: 2022-09-17

## 2022-09-12 RX ORDER — SODIUM CHLORIDE 0.9 % (FLUSH) 0.9 %
5-40 SYRINGE (ML) INJECTION EVERY 12 HOURS SCHEDULED
Status: DISCONTINUED | OUTPATIENT
Start: 2022-09-12 | End: 2022-09-27 | Stop reason: HOSPADM

## 2022-09-12 RX ORDER — POLYETHYLENE GLYCOL 3350 17 G/17G
17 POWDER, FOR SOLUTION ORAL DAILY PRN
Status: DISCONTINUED | OUTPATIENT
Start: 2022-09-12 | End: 2022-09-27 | Stop reason: HOSPADM

## 2022-09-12 RX ORDER — ONDANSETRON 2 MG/ML
4 INJECTION INTRAMUSCULAR; INTRAVENOUS EVERY 6 HOURS PRN
Status: DISCONTINUED | OUTPATIENT
Start: 2022-09-12 | End: 2022-09-17

## 2022-09-12 RX ORDER — ACETAMINOPHEN 650 MG/1
650 SUPPOSITORY RECTAL EVERY 6 HOURS PRN
Status: DISCONTINUED | OUTPATIENT
Start: 2022-09-12 | End: 2022-09-27 | Stop reason: HOSPADM

## 2022-09-12 RX ORDER — ALBUMIN (HUMAN) 12.5 G/50ML
25 SOLUTION INTRAVENOUS EVERY 6 HOURS
Status: DISPENSED | OUTPATIENT
Start: 2022-09-12 | End: 2022-09-13

## 2022-09-12 RX ORDER — DOCUSATE SODIUM 100 MG/1
100 CAPSULE, LIQUID FILLED ORAL 2 TIMES DAILY
Status: DISCONTINUED | OUTPATIENT
Start: 2022-09-12 | End: 2022-09-24

## 2022-09-12 RX ORDER — POTASSIUM CHLORIDE 20 MEQ/1
40 TABLET, EXTENDED RELEASE ORAL PRN
Status: DISCONTINUED | OUTPATIENT
Start: 2022-09-12 | End: 2022-09-27 | Stop reason: HOSPADM

## 2022-09-12 RX ORDER — POTASSIUM CHLORIDE 7.45 MG/ML
10 INJECTION INTRAVENOUS PRN
Status: DISCONTINUED | OUTPATIENT
Start: 2022-09-12 | End: 2022-09-27 | Stop reason: HOSPADM

## 2022-09-12 RX ORDER — MORPHINE SULFATE 2 MG/ML
1 INJECTION, SOLUTION INTRAMUSCULAR; INTRAVENOUS EVERY 4 HOURS PRN
Status: DISCONTINUED | OUTPATIENT
Start: 2022-09-12 | End: 2022-09-17

## 2022-09-12 RX ORDER — ONDANSETRON 2 MG/ML
4 INJECTION INTRAMUSCULAR; INTRAVENOUS
Status: COMPLETED | OUTPATIENT
Start: 2022-09-12 | End: 2022-09-12

## 2022-09-12 RX ORDER — SODIUM CHLORIDE 0.9 % (FLUSH) 0.9 %
5-40 SYRINGE (ML) INJECTION PRN
Status: DISCONTINUED | OUTPATIENT
Start: 2022-09-12 | End: 2022-09-27 | Stop reason: HOSPADM

## 2022-09-12 RX ORDER — SODIUM CHLORIDE 9 MG/ML
INJECTION, SOLUTION INTRAVENOUS PRN
Status: DISCONTINUED | OUTPATIENT
Start: 2022-09-12 | End: 2022-09-27 | Stop reason: HOSPADM

## 2022-09-12 RX ORDER — METOCLOPRAMIDE 10 MG/1
10 TABLET ORAL
Status: DISCONTINUED | OUTPATIENT
Start: 2022-09-12 | End: 2022-09-20

## 2022-09-12 RX ORDER — SODIUM CHLORIDE 9 MG/ML
INJECTION, SOLUTION INTRAVENOUS ONCE
Status: COMPLETED | OUTPATIENT
Start: 2022-09-12 | End: 2022-09-12

## 2022-09-12 RX ORDER — ONDANSETRON 2 MG/ML
INJECTION INTRAMUSCULAR; INTRAVENOUS
Status: DISPENSED
Start: 2022-09-12 | End: 2022-09-13

## 2022-09-12 RX ORDER — OXYCODONE HYDROCHLORIDE 5 MG/1
5 TABLET ORAL EVERY 4 HOURS PRN
Status: DISCONTINUED | OUTPATIENT
Start: 2022-09-12 | End: 2022-09-27 | Stop reason: HOSPADM

## 2022-09-12 RX ORDER — MAGNESIUM SULFATE IN WATER 40 MG/ML
2000 INJECTION, SOLUTION INTRAVENOUS PRN
Status: DISCONTINUED | OUTPATIENT
Start: 2022-09-12 | End: 2022-09-27 | Stop reason: HOSPADM

## 2022-09-12 RX ORDER — SODIUM CHLORIDE 9 MG/ML
INJECTION, SOLUTION INTRAVENOUS CONTINUOUS
Status: DISCONTINUED | OUTPATIENT
Start: 2022-09-12 | End: 2022-09-12

## 2022-09-12 RX ORDER — ACETAMINOPHEN 325 MG/1
650 TABLET ORAL EVERY 6 HOURS PRN
Status: DISCONTINUED | OUTPATIENT
Start: 2022-09-12 | End: 2022-09-27 | Stop reason: HOSPADM

## 2022-09-12 RX ADMIN — DIATRIZOATE MEGLUMINE AND DIATRIZOATE SODIUM 15 ML: 660; 100 LIQUID ORAL; RECTAL at 18:07

## 2022-09-12 RX ADMIN — METOCLOPRAMIDE HYDROCHLORIDE 10 MG: 10 TABLET ORAL at 23:07

## 2022-09-12 RX ADMIN — SODIUM CHLORIDE 40 MG: 9 INJECTION, SOLUTION INTRAMUSCULAR; INTRAVENOUS; SUBCUTANEOUS at 17:21

## 2022-09-12 RX ADMIN — ONDANSETRON 4 MG: 2 INJECTION INTRAMUSCULAR; INTRAVENOUS at 17:32

## 2022-09-12 RX ADMIN — MORPHINE SULFATE 1 MG: 2 INJECTION, SOLUTION INTRAMUSCULAR; INTRAVENOUS at 23:07

## 2022-09-12 RX ADMIN — SODIUM CHLORIDE 1000 ML: 9 INJECTION, SOLUTION INTRAVENOUS at 17:20

## 2022-09-12 RX ADMIN — DOCUSATE SODIUM 100 MG: 100 CAPSULE, LIQUID FILLED ORAL at 23:07

## 2022-09-12 RX ADMIN — ONDANSETRON 4 MG: 2 INJECTION INTRAMUSCULAR; INTRAVENOUS at 20:16

## 2022-09-12 RX ADMIN — ALBUMIN (HUMAN) 25 G: 0.25 INJECTION, SOLUTION INTRAVENOUS at 23:06

## 2022-09-12 ASSESSMENT — ENCOUNTER SYMPTOMS
NAUSEA: 1
BLOOD IN STOOL: 0
ABDOMINAL PAIN: 1
ABDOMINAL DISTENTION: 1
CONSTIPATION: 0
COUGH: 0
VOMITING: 1
SORE THROAT: 0
DIARRHEA: 0

## 2022-09-12 ASSESSMENT — PAIN - FUNCTIONAL ASSESSMENT: PAIN_FUNCTIONAL_ASSESSMENT: NONE - DENIES PAIN

## 2022-09-12 NOTE — ED TRIAGE NOTES
Pt arrives via POV coming from home c/o abdominal pain and vomiting blood that started this afternoon. No hx. Pt states she had 2x episodes of vomiting blood. At time of triage, pt abdomen appears distended. No other complaints at time of triage.

## 2022-09-12 NOTE — ED PROVIDER NOTES
Emergency Department Provider Note                   PCP:                None Provider               Age: 52 y.o. Sex: female       ICD-10-CM    1. Intractable vomiting with nausea, unspecified vomiting type  R11.2       2.  Generalized abdominal pain  R10.84           DISPOSITION Admitted 09/12/2022 09:50:08 PM        [facility]  Number of Diagnoses or Management Options  Generalized abdominal pain: new, needed workup  Intractable vomiting with nausea, unspecified vomiting type: new, needed workup     Amount and/or Complexity of Data Reviewed  Clinical lab tests: ordered and reviewed  Tests in the radiology section of CPT®: ordered and reviewed  Review and summarize past medical records: yes    Risk of Complications, Morbidity, and/or Mortality  Presenting problems: moderate  Diagnostic procedures: moderate  Management options: moderate    Patient Progress  Patient progress: improved       Orders Placed This Encounter   Procedures    CT ABDOMEN PELVIS W IV CONTRAST Additional Contrast? Oral    CBC with Diff    CMP    Lipase    POC CHEM 8    Basic Metabolic Panel w/ Reflex to MG    CBC with Auto Differential    Hemoglobin    Protime-INR    Body fluid cell count    Protein, Body Fluid    Albumin, Body Fluid    Cytology, Non-Gyn    Urinalysis with Reflex to Culture    Occult blood gastric / duodenum    Diet NPO Exceptions are: Sips of Water with Meds, Ice Chips    POCT Urine Dipstick    Nursing communication    Vital signs per unit routine    Telemetry monitoring - 24 hour duration    Notify physician    Up as tolerated    Daily weights    Intake and output    Elevate Head of Bed     Monitor for signs/symptoms of urinary retention    Straight cath    Place intermittent pneumatic compression device    Full code    Consult to Gastroenterology    Inpatient consult to IR    Inpatient consult to Oncology    Initiate Oxygen Therapy Protocol    EKG 12 Lead    TYPE AND SCREEN    Saline lock IV    ADMIT TO INPATIENT Medications   ondansetron (ZOFRAN) 4 MG/2ML injection (has no administration in time range)   diatrizoate meglumine-sodium (GASTROGRAFIN) 66-10 % solution 15 mL (15 mLs Oral Given 9/12/22 1807)   pantoprazole (PROTONIX) 40 mg in sodium chloride (PF) 10 mL injection (has no administration in time range)   apixaban (ELIQUIS) tablet 5 mg ( Oral Automatically Held 9/15/22 2100)   docusate sodium (COLACE) capsule 100 mg (100 mg Oral Given 9/12/22 2307)   metoclopramide (REGLAN) tablet 10 mg (10 mg Oral Given 9/12/22 2307)   sodium chloride flush 0.9 % injection 5-40 mL (has no administration in time range)   sodium chloride flush 0.9 % injection 5-40 mL (has no administration in time range)   0.9 % sodium chloride infusion (has no administration in time range)   ondansetron (ZOFRAN-ODT) disintegrating tablet 4 mg (has no administration in time range)     Or   ondansetron (ZOFRAN) injection 4 mg (has no administration in time range)   polyethylene glycol (GLYCOLAX) packet 17 g (has no administration in time range)   acetaminophen (TYLENOL) tablet 650 mg (has no administration in time range)     Or   acetaminophen (TYLENOL) suppository 650 mg (has no administration in time range)   potassium chloride (KLOR-CON M) extended release tablet 40 mEq (has no administration in time range)     Or   potassium bicarb-citric acid (EFFER-K) effervescent tablet 40 mEq (has no administration in time range)     Or   potassium chloride 10 mEq/100 mL IVPB (Peripheral Line) (has no administration in time range)   magnesium sulfate 2000 mg in 50 mL IVPB premix (has no administration in time range)   albumin human 25 % IV solution 25 g (25 g IntraVENous New Bag 9/12/22 2306)   oxyCODONE (ROXICODONE) immediate release tablet 5 mg (has no administration in time range)   morphine injection 1 mg (1 mg IntraVENous Given 9/12/22 2307)   0.9 % sodium chloride infusion (1,000 mLs IntraVENous New Bag 9/12/22 1720)   ondansetron (ZOFRAN) injection 4 mg (4 mg IntraVENous Given 9/12/22 1732)   ondansetron (ZOFRAN) injection 4 mg (4 mg IntraVENous Given 9/12/22 2016)       New Prescriptions    No medications on file        Lakia Fall is a 52 y.o. female who presents to the Emergency Department with chief complaint of    Chief Complaint   Patient presents with    Abdominal Pain      Per nurses notes: \"Pt arrives via POV coming from home c/o abdominal pain and vomiting blood that started this afternoon. No hx. Pt states she had 2x episodes of vomiting blood. At time of triage, pt abdomen appears distended. No other complaints at time of triage. \"    Patient initially diagnosed with cancer in late January of this year, went in in mid August for a debulking procedure which was unsuccessful and she was closed with no further exploration. Patient did have some red Santiago-Aid prior to her episodes of nausea and vomiting, but did notice 2 episodes of vomiting dark red vomit. She denies any change in bowel habits, melena or medic easy. She does describe an increased abdominal girth and bloating over the last 4 to 5 days. The history is provided by the patient and the spouse. Nausea & Vomiting  Severity:  Moderate  Duration:  3 hours  Timing:  Intermittent  Number of daily episodes:  2  Emesis appearance: Dark red. Progression:  Unchanged  Chronicity:  New  Recent urination:  Normal  Relieved by:  Nothing  Worsened by:  Liquids  Ineffective treatments:  Antiemetics  Associated symptoms: abdominal pain    Associated symptoms: no chills, no cough, no diarrhea, no fever, no headaches, no myalgias, no sore throat and no URI    Risk factors: prior abdominal surgery    Risk factors comment:  Patient has had a gastric sleeve in the past and last month had a very short exploratory laparotomy. Review of Systems   Constitutional:  Positive for appetite change. Negative for chills and fever. HENT:  Negative for sore throat. Respiratory:  Negative for cough. Gastrointestinal:  Positive for abdominal distention (For the last 4 to 5 days.), abdominal pain, nausea and vomiting. Negative for blood in stool, constipation and diarrhea. See HPI   Musculoskeletal:  Negative for myalgias. Neurological:  Negative for headaches. All other systems reviewed and are negative.     Past Medical History:   Diagnosis Date    Anemia     -- not a problem since hyst    Colon cancer ([address] dx 2022     plans for chemo--- followed by dr Ann Masters    COVID-19 2020    no hospitalization    GERD (gastroesophageal reflux disease)     managed with med    History of colonoscopy 2018    Dr. Yomi Vazquez, nl (see media note), R     Hx of blood clots 2022    per pt \"small clot on lung identified by CT scan\"  CT Scan impression:- Nonobstructive pulmonary filling defect involving Left Lower Lobe     Hypotension     asymptomatic    Obstruction of fallopian tube     per pt has \"1 good tube\"    Peritoneal carcinomatosis ([address]     Weight loss     80lbs weight loss after gastric sleeve        Past Surgical History:   Procedure Laterality Date     SECTION      CYSTOSCOPY Bilateral 2022    CYSTOSCOPY BILATERAL RETROGRADE PYELOGRAM performed by Jennifer Archibald MD at [address] Bilateral 2022    BILATERAL CYSTOSCOPY URETERAL STENT EXCHANGE performed by Jennifer Archibald MD at [address]  2014    gastric sleeve- Choudhari    HYSTERECTOMY (CERVIX STATUS UNKNOWN)      2018    HYSTERECTOMY (CERVIX STATUS UNKNOWN)  2020    TLH w/ Bilateral salpingectomy and left oophorectomy    IR PORT PLACEMENT EQUAL OR GREATER THAN 5 YEARS  2022    IR PORT PLACEMENT EQUAL OR GREATER THAN 5 YEARS  2022    IR PORT PLACEMENT EQUAL OR GREATER THAN 5 YEARS 2022 [facility] SPECIALS    LAPAROTOMY N/A 2022    EXPLORATORY LAPAROTOMY/ ENTEROENTEROSTOMY performed by Alfredo Eisenberg MD at [facility]. [address]  age [place] Oscar 21s\" VALACYCLOVIR (VALTREX) 500 MG TABLET    Take 500 mg by mouth daily        Vitals signs and nursing note reviewed. Patient Vitals for the past 4 hrs:   Pulse BP SpO2   09/12/22 2245 98 (!) 136/92 96 %          Physical Exam  Vitals and nursing note reviewed. Constitutional:       General: She is in acute distress. HENT:      Head: Normocephalic and atraumatic. Nose: Nose normal.      Mouth/Throat:      Mouth: Mucous membranes are moist.   Eyes:      Extraocular Movements: Extraocular movements intact. Conjunctiva/sclera: Conjunctivae normal.      Pupils: Pupils are equal, round, and reactive to light. Cardiovascular:      Rate and Rhythm: Normal rate and regular rhythm. Heart sounds: No murmur heard. Pulmonary:      Effort: Pulmonary effort is normal.      Breath sounds: Normal breath sounds. Abdominal:      General: Abdomen is flat and protuberant. Bowel sounds are normal.      Palpations: Abdomen is soft. There is shifting dullness. There is no hepatomegaly, splenomegaly, mass or pulsatile mass. Tenderness: There is generalized abdominal tenderness (Mild). There is no right CVA tenderness, left CVA tenderness, guarding or rebound. Negative signs include Manzo's sign and McBurney's sign. Hernia: No hernia is present. Musculoskeletal:         General: Normal range of motion. Cervical back: Normal range of motion and neck supple. Skin:     General: Skin is warm and dry. Neurological:      General: No focal deficit present. Mental Status: She is alert and oriented to person, place, and time. Psychiatric:         Mood and Affect: Mood and affect normal.         Speech: Speech normal.        Procedures    The patient was observed in the ED. patient had 1 episode of vomiting while in the department, all of it more bilious without any evidence of bright red blood.   Patient did have red Santiago-Aid prior to the episode of nausea and vomiting, and I suspect this is what made it red. Despite Zofran and fluids, the patient still complains of nausea and vomiting. The case was discussed with hospitalist will admit.     Results Reviewed:      Recent Results (from the past 24 hour(s))   CBC with Diff    Collection Time: 09/12/22  5:22 PM   Result Value Ref Range    WBC 9.9 4.3 - 11.1 K/uL    RBC 4.18 4.05 - 5.2 M/uL    Hemoglobin 11.8 11.7 - 15.4 g/dL    Hematocrit 38.7 35.8 - 46.3 %    MCV 92.6 79.6 - 97.8 FL    MCH 28.2 26.1 - 32.9 PG    MCHC 30.5 (L) 31.4 - 35.0 g/dL    RDW 13.5 11.9 - 14.6 %    Platelets 515 787 - 178 K/uL    MPV 10.2 9.4 - 12.3 FL    nRBC 0.00 0.0 - 0.2 K/uL    Differential Type AUTOMATED      Seg Neutrophils 93 (H) 43 - 78 %    Lymphocytes 5 (L) 13 - 44 %    Monocytes 1 (L) 4.0 - 12.0 %    Eosinophils % 0 (L) 0.5 - 7.8 %    Basophils 0 0.0 - 2.0 %    Immature Granulocytes 0 0.0 - 5.0 %    Segs Absolute 9.2 (H) 1.7 - 8.2 K/UL    Absolute Lymph # 0.5 0.5 - 4.6 K/UL    Absolute Mono # 0.1 0.1 - 1.3 K/UL    Absolute Eos # 0.0 0.0 - 0.8 K/UL    Basophils Absolute 0.0 0.0 - 0.2 K/UL    Absolute Immature Granulocyte 0.0 0.0 - 0.5 K/UL   Lipase    Collection Time: 09/12/22  5:22 PM   Result Value Ref Range    Lipase 43 (L) 73 - 393 U/L   TYPE AND SCREEN    Collection Time: 09/12/22  5:22 PM   Result Value Ref Range    Crossmatch expiration date 09/15/2022,2087     ABO/Rh A POSITIVE     Antibody Screen NEG    POC CHEM 8    Collection Time: 09/12/22  5:47 PM   Result Value Ref Range    POC Sodium 138 136 - 145 mmol/L    POC Potassium 3.8 3.5 - 5.1 mmol/L    POC Chloride 100 98 - 107 mmol/L    POC TCO2 26.1 21 - 32 mmol/L    POC Glucose 131 (H) 65 - 100 mg/dL    POC BUN 13 8 - 26 mg/dL    POC Creatinine 0.95 0.8 - 1.5 mg/dL    POC GFR African American >60 >60 ml/min/1.73m2    Glomerular Filtration Rate, POC >60 >60 ml/min/1.73m2     CT ABDOMEN PELVIS W IV CONTRAST Additional Contrast? Oral   Final Result      Large amount of intraabdominal ascites with mesenteric and peritoneal nodularity   as well as peritoneal enhancement is most concerning for peritoneal   carcinomatosis. Bilateral hydronephrosis with ureteral stents in place. Thick-walled urinary bladder. Correlate with evidence of cystitis. Thick-walled gallbladder is felt likely to be sympathetic. The gallbladder is   not distended. Voice dictation software was used during the making of this note. This software is not perfect and grammatical and other typographical errors may be present. This note has not been completely proofread for errors.      Nneka Grewal MD  09/13/22 [id number]

## 2022-09-12 NOTE — PROGRESS NOTES
9/12/22 pt sent a [employer] message this morning concerning some abdominal distention. A little later in the morning/noon she messaged again saying she had vomited blood but stated she had been drinking red gatorade. I attempted to call her around 1:40 pm but had to leave a message. I spoke with her around 4:20 - she stated she did not have any additional N/V, but was having some occasional abd discomfort. She said swelling in Abd was slightly more that it was when she was seen on 9/1. It is not causing any shortness or breath or persistent discomfort. She stated she thought maybe she should go to ED to be evaluated. I advised her if she felt she needed to be seen urgently to proceed to ED. At time of phone conversation she had not had any additional N/V or pain. Asked her to keep us posted.

## 2022-09-13 ENCOUNTER — ANESTHESIA EVENT (OUTPATIENT)
Dept: ENDOSCOPY | Age: 47
DRG: 374 | End: 2022-09-13
Payer: COMMERCIAL

## 2022-09-13 ENCOUNTER — APPOINTMENT (OUTPATIENT)
Dept: INTERVENTIONAL RADIOLOGY/VASCULAR | Age: 47
DRG: 374 | End: 2022-09-13
Payer: COMMERCIAL

## 2022-09-13 ENCOUNTER — ANESTHESIA (OUTPATIENT)
Dept: ENDOSCOPY | Age: 47
DRG: 374 | End: 2022-09-13
Payer: COMMERCIAL

## 2022-09-13 PROBLEM — R11.10 INTRACTABLE VOMITING: Status: ACTIVE | Noted: 2022-09-13

## 2022-09-13 LAB
ANION GAP SERPL CALC-SCNC: 4 MMOL/L (ref 4–13)
APPEARANCE UR: ABNORMAL
BACTERIA URNS QL MICRO: ABNORMAL /HPF
BASOPHILS # BLD: 0 K/UL (ref 0–0.2)
BASOPHILS NFR BLD: 0 % (ref 0–2)
BILIRUB UR QL: NEGATIVE
BUN SERPL-MCNC: 13 MG/DL (ref 6–23)
CALCIUM SERPL-MCNC: 6.3 MG/DL (ref 8.3–10.4)
CASTS URNS QL MICRO: 0 /LPF
CHLORIDE SERPL-SCNC: 115 MMOL/L (ref 101–110)
CO2 SERPL-SCNC: 20 MMOL/L (ref 21–32)
COLOR UR: ABNORMAL
CREAT SERPL-MCNC: 0.6 MG/DL (ref 0.6–1)
CRYSTALS URNS QL MICRO: 0 /LPF
DIFFERENTIAL METHOD BLD: ABNORMAL
EOSINOPHIL # BLD: 0 K/UL (ref 0–0.8)
EOSINOPHIL NFR BLD: 1 % (ref 0.5–7.8)
EPI CELLS #/AREA URNS HPF: ABNORMAL /HPF
ERYTHROCYTE [DISTWIDTH] IN BLOOD BY AUTOMATED COUNT: 13.5 % (ref 11.9–14.6)
GLUCOSE SERPL-MCNC: 89 MG/DL (ref 65–100)
GLUCOSE UR STRIP.AUTO-MCNC: NEGATIVE MG/DL
HCT VFR BLD AUTO: 29.8 % (ref 35.8–46.3)
HGB BLD-MCNC: 8.9 G/DL (ref 11.7–15.4)
HGB UR QL STRIP: ABNORMAL
IMM GRANULOCYTES # BLD AUTO: 0 K/UL (ref 0–0.5)
IMM GRANULOCYTES NFR BLD AUTO: 0 % (ref 0–5)
INR PPP: 1.4
KETONES UR QL STRIP.AUTO: 15 MG/DL
LEUKOCYTE ESTERASE UR QL STRIP.AUTO: ABNORMAL
LYMPHOCYTES # BLD: 0.5 K/UL (ref 0.5–4.6)
LYMPHOCYTES NFR BLD: 9 % (ref 13–44)
MAGNESIUM SERPL-MCNC: 1.6 MG/DL (ref 1.8–2.4)
MCH RBC QN AUTO: 28.2 PG (ref 26.1–32.9)
MCHC RBC AUTO-ENTMCNC: 29.9 G/DL (ref 31.4–35)
MCV RBC AUTO: 94.3 FL (ref 79.6–97.8)
MONOCYTES # BLD: 0.1 K/UL (ref 0.1–1.3)
MONOCYTES NFR BLD: 2 % (ref 4–12)
MUCOUS THREADS URNS QL MICRO: 0 /LPF
NEUTS SEG # BLD: 5.1 K/UL (ref 1.7–8.2)
NEUTS SEG NFR BLD: 88 % (ref 43–78)
NITRITE UR QL STRIP.AUTO: NEGATIVE
NRBC # BLD: 0 K/UL (ref 0–0.2)
OTHER OBSERVATIONS: ABNORMAL
PH UR STRIP: 6 [PH] (ref 5–9)
PLATELET # BLD AUTO: 296 K/UL (ref 150–450)
PMV BLD AUTO: 10.4 FL (ref 9.4–12.3)
POTASSIUM SERPL-SCNC: 2.8 MMOL/L (ref 3.5–5.1)
PROT UR STRIP-MCNC: 300 MG/DL
PROTHROMBIN TIME: 17.5 SEC (ref 12.6–14.5)
RBC # BLD AUTO: 3.16 M/UL (ref 4.05–5.2)
RBC #/AREA URNS HPF: ABNORMAL /HPF
SODIUM SERPL-SCNC: 139 MMOL/L (ref 136–145)
SP GR UR REFRACTOMETRY: >1.035 (ref 1–1.02)
UROBILINOGEN UR QL STRIP.AUTO: 1 EU/DL (ref 0.2–1)
WBC # BLD AUTO: 5.8 K/UL (ref 4.3–11.1)
WBC URNS QL MICRO: ABNORMAL /HPF

## 2022-09-13 PROCEDURE — 6370000000 HC RX 637 (ALT 250 FOR IP): Performed by: FAMILY MEDICINE

## 2022-09-13 PROCEDURE — 3609017100 HC EGD: Performed by: INTERNAL MEDICINE

## 2022-09-13 PROCEDURE — 6360000002 HC RX W HCPCS: Performed by: PHYSICIAN ASSISTANT

## 2022-09-13 PROCEDURE — 6360000002 HC RX W HCPCS: Performed by: NURSE ANESTHETIST, CERTIFIED REGISTERED

## 2022-09-13 PROCEDURE — C9113 INJ PANTOPRAZOLE SODIUM, VIA: HCPCS | Performed by: FAMILY MEDICINE

## 2022-09-13 PROCEDURE — 2709999900 HC NON-CHARGEABLE SUPPLY: Performed by: INTERNAL MEDICINE

## 2022-09-13 PROCEDURE — 88112 CYTOPATH CELL ENHANCE TECH: CPT

## 2022-09-13 PROCEDURE — 6360000002 HC RX W HCPCS: Performed by: FAMILY MEDICINE

## 2022-09-13 PROCEDURE — 2580000003 HC RX 258: Performed by: FAMILY MEDICINE

## 2022-09-13 PROCEDURE — P9047 ALBUMIN (HUMAN), 25%, 50ML: HCPCS | Performed by: FAMILY MEDICINE

## 2022-09-13 PROCEDURE — 6360000002 HC RX W HCPCS: Performed by: INTERNAL MEDICINE

## 2022-09-13 PROCEDURE — 82042 OTHER SOURCE ALBUMIN QUAN EA: CPT

## 2022-09-13 PROCEDURE — 88305 TISSUE EXAM BY PATHOLOGIST: CPT

## 2022-09-13 PROCEDURE — A4216 STERILE WATER/SALINE, 10 ML: HCPCS | Performed by: FAMILY MEDICINE

## 2022-09-13 PROCEDURE — 7100000011 HC PHASE II RECOVERY - ADDTL 15 MIN: Performed by: INTERNAL MEDICINE

## 2022-09-13 PROCEDURE — 89050 BODY FLUID CELL COUNT: CPT

## 2022-09-13 PROCEDURE — P9047 ALBUMIN (HUMAN), 25%, 50ML: HCPCS | Performed by: INTERNAL MEDICINE

## 2022-09-13 PROCEDURE — 85610 PROTHROMBIN TIME: CPT

## 2022-09-13 PROCEDURE — 3700000000 HC ANESTHESIA ATTENDED CARE: Performed by: INTERNAL MEDICINE

## 2022-09-13 PROCEDURE — 0W9G3ZZ DRAINAGE OF PERITONEAL CAVITY, PERCUTANEOUS APPROACH: ICD-10-PCS | Performed by: RADIOLOGY

## 2022-09-13 PROCEDURE — 2580000003 HC RX 258: Performed by: NURSE ANESTHETIST, CERTIFIED REGISTERED

## 2022-09-13 PROCEDURE — 80048 BASIC METABOLIC PNL TOTAL CA: CPT

## 2022-09-13 PROCEDURE — 83735 ASSAY OF MAGNESIUM: CPT

## 2022-09-13 PROCEDURE — 85025 COMPLETE CBC W/AUTO DIFF WBC: CPT

## 2022-09-13 PROCEDURE — 2709999900 IR US GUIDED PARACENTESIS

## 2022-09-13 PROCEDURE — 2500000003 HC RX 250 WO HCPCS: Performed by: NURSE ANESTHETIST, CERTIFIED REGISTERED

## 2022-09-13 PROCEDURE — 6370000000 HC RX 637 (ALT 250 FOR IP): Performed by: INTERNAL MEDICINE

## 2022-09-13 PROCEDURE — 99222 1ST HOSP IP/OBS MODERATE 55: CPT | Performed by: INTERNAL MEDICINE

## 2022-09-13 PROCEDURE — 1100000000 HC RM PRIVATE

## 2022-09-13 PROCEDURE — 7100000010 HC PHASE II RECOVERY - FIRST 15 MIN: Performed by: INTERNAL MEDICINE

## 2022-09-13 PROCEDURE — 2500000003 HC RX 250 WO HCPCS: Performed by: PHYSICIAN ASSISTANT

## 2022-09-13 PROCEDURE — 0DJ08ZZ INSPECTION OF UPPER INTESTINAL TRACT, VIA NATURAL OR ARTIFICIAL OPENING ENDOSCOPIC: ICD-10-PCS | Performed by: INTERNAL MEDICINE

## 2022-09-13 PROCEDURE — 81001 URINALYSIS AUTO W/SCOPE: CPT

## 2022-09-13 PROCEDURE — 84157 ASSAY OF PROTEIN OTHER: CPT

## 2022-09-13 PROCEDURE — 87205 SMEAR GRAM STAIN: CPT

## 2022-09-13 RX ORDER — MAGNESIUM SULFATE IN WATER 40 MG/ML
2000 INJECTION, SOLUTION INTRAVENOUS ONCE
Status: COMPLETED | OUTPATIENT
Start: 2022-09-13 | End: 2022-09-13

## 2022-09-13 RX ORDER — LIDOCAINE HYDROCHLORIDE 20 MG/ML
INJECTION, SOLUTION INFILTRATION; PERINEURAL
Status: COMPLETED | OUTPATIENT
Start: 2022-09-13 | End: 2022-09-13

## 2022-09-13 RX ORDER — ALBUMIN (HUMAN) 12.5 G/50ML
25 SOLUTION INTRAVENOUS EVERY 6 HOURS
Status: COMPLETED | OUTPATIENT
Start: 2022-09-14 | End: 2022-09-14

## 2022-09-13 RX ORDER — POTASSIUM CHLORIDE 7.45 MG/ML
10 INJECTION INTRAVENOUS
Status: DISCONTINUED | OUTPATIENT
Start: 2022-09-13 | End: 2022-09-13

## 2022-09-13 RX ORDER — PANTOPRAZOLE SODIUM 40 MG/1
40 TABLET, DELAYED RELEASE ORAL
Status: DISCONTINUED | OUTPATIENT
Start: 2022-09-13 | End: 2022-09-17

## 2022-09-13 RX ORDER — SODIUM CHLORIDE, SODIUM LACTATE, POTASSIUM CHLORIDE, CALCIUM CHLORIDE 600; 310; 30; 20 MG/100ML; MG/100ML; MG/100ML; MG/100ML
INJECTION, SOLUTION INTRAVENOUS CONTINUOUS PRN
Status: DISCONTINUED | OUTPATIENT
Start: 2022-09-13 | End: 2022-09-13 | Stop reason: SDUPTHER

## 2022-09-13 RX ORDER — POTASSIUM CHLORIDE 7.45 MG/ML
10 INJECTION INTRAVENOUS
Status: COMPLETED | OUTPATIENT
Start: 2022-09-13 | End: 2022-09-13

## 2022-09-13 RX ORDER — LIDOCAINE HYDROCHLORIDE 20 MG/ML
INJECTION, SOLUTION EPIDURAL; INFILTRATION; INTRACAUDAL; PERINEURAL PRN
Status: DISCONTINUED | OUTPATIENT
Start: 2022-09-13 | End: 2022-09-13 | Stop reason: SDUPTHER

## 2022-09-13 RX ORDER — PROPOFOL 10 MG/ML
INJECTION, EMULSION INTRAVENOUS PRN
Status: DISCONTINUED | OUTPATIENT
Start: 2022-09-13 | End: 2022-09-13 | Stop reason: SDUPTHER

## 2022-09-13 RX ORDER — POTASSIUM CHLORIDE 20 MEQ/1
40 TABLET, EXTENDED RELEASE ORAL 2 TIMES DAILY WITH MEALS
Status: DISCONTINUED | OUTPATIENT
Start: 2022-09-13 | End: 2022-09-13

## 2022-09-13 RX ADMIN — DOCUSATE SODIUM 100 MG: 100 CAPSULE, LIQUID FILLED ORAL at 08:31

## 2022-09-13 RX ADMIN — LIDOCAINE HYDROCHLORIDE 60 MG: 20 INJECTION, SOLUTION EPIDURAL; INFILTRATION; INTRACAUDAL; PERINEURAL at 14:30

## 2022-09-13 RX ADMIN — SODIUM CHLORIDE, SODIUM LACTATE, POTASSIUM CHLORIDE, AND CALCIUM CHLORIDE: 600; 310; 30; 20 INJECTION, SOLUTION INTRAVENOUS at 14:21

## 2022-09-13 RX ADMIN — ONDANSETRON 4 MG: 2 INJECTION INTRAMUSCULAR; INTRAVENOUS at 13:02

## 2022-09-13 RX ADMIN — MORPHINE SULFATE 1 MG: 2 INJECTION, SOLUTION INTRAMUSCULAR; INTRAVENOUS at 13:02

## 2022-09-13 RX ADMIN — OXYCODONE 5 MG: 5 TABLET ORAL at 08:31

## 2022-09-13 RX ADMIN — SODIUM CHLORIDE: 9 INJECTION, SOLUTION INTRAVENOUS at 12:24

## 2022-09-13 RX ADMIN — LIDOCAINE HYDROCHLORIDE 200 MG: 20 INJECTION, SOLUTION INFILTRATION; PERINEURAL at 16:12

## 2022-09-13 RX ADMIN — PROPOFOL 50 MG: 10 INJECTION, EMULSION INTRAVENOUS at 14:30

## 2022-09-13 RX ADMIN — ALBUMIN (HUMAN) 25 G: 0.25 INJECTION, SOLUTION INTRAVENOUS at 05:22

## 2022-09-13 RX ADMIN — POTASSIUM CHLORIDE 40 MEQ: 1500 TABLET, EXTENDED RELEASE ORAL at 08:31

## 2022-09-13 RX ADMIN — METOCLOPRAMIDE HYDROCHLORIDE 10 MG: 10 TABLET ORAL at 20:49

## 2022-09-13 RX ADMIN — MORPHINE SULFATE 1 MG: 2 INJECTION, SOLUTION INTRAMUSCULAR; INTRAVENOUS at 06:00

## 2022-09-13 RX ADMIN — PANTOPRAZOLE SODIUM 40 MG: 40 TABLET, DELAYED RELEASE ORAL at 17:57

## 2022-09-13 RX ADMIN — PROPOFOL 15 MG: 10 INJECTION, EMULSION INTRAVENOUS at 14:33

## 2022-09-13 RX ADMIN — METOCLOPRAMIDE HYDROCHLORIDE 10 MG: 10 TABLET ORAL at 08:31

## 2022-09-13 RX ADMIN — SODIUM CHLORIDE, PRESERVATIVE FREE 10 ML: 5 INJECTION INTRAVENOUS at 20:49

## 2022-09-13 RX ADMIN — MAGNESIUM SULFATE HEPTAHYDRATE 2000 MG: 40 INJECTION, SOLUTION INTRAVENOUS at 17:59

## 2022-09-13 RX ADMIN — METOCLOPRAMIDE HYDROCHLORIDE 10 MG: 10 TABLET ORAL at 17:57

## 2022-09-13 RX ADMIN — SODIUM CHLORIDE 40 MG: 9 INJECTION, SOLUTION INTRAMUSCULAR; INTRAVENOUS; SUBCUTANEOUS at 06:00

## 2022-09-13 RX ADMIN — ALBUMIN (HUMAN) 25 G: 0.25 INJECTION, SOLUTION INTRAVENOUS at 17:38

## 2022-09-13 RX ADMIN — DOCUSATE SODIUM 100 MG: 100 CAPSULE, LIQUID FILLED ORAL at 20:49

## 2022-09-13 RX ADMIN — POTASSIUM CHLORIDE 10 MEQ: 7.46 INJECTION, SOLUTION INTRAVENOUS at 12:22

## 2022-09-13 RX ADMIN — SODIUM CHLORIDE, PRESERVATIVE FREE 5 ML: 5 INJECTION INTRAVENOUS at 09:00

## 2022-09-13 RX ADMIN — ONDANSETRON 4 MG: 2 INJECTION INTRAMUSCULAR; INTRAVENOUS at 22:36

## 2022-09-13 RX ADMIN — ALBUMIN (HUMAN) 25 G: 0.25 INJECTION, SOLUTION INTRAVENOUS at 23:38

## 2022-09-13 RX ADMIN — PROPOFOL 10 MG: 10 INJECTION, EMULSION INTRAVENOUS at 14:34

## 2022-09-13 RX ADMIN — PROPOFOL 15 MG: 10 INJECTION, EMULSION INTRAVENOUS at 14:32

## 2022-09-13 RX ADMIN — ONDANSETRON 4 MG: 2 INJECTION INTRAMUSCULAR; INTRAVENOUS at 06:01

## 2022-09-13 RX ADMIN — MORPHINE SULFATE 1 MG: 2 INJECTION, SOLUTION INTRAMUSCULAR; INTRAVENOUS at 17:52

## 2022-09-13 RX ADMIN — POTASSIUM CHLORIDE 10 MEQ: 7.46 INJECTION, SOLUTION INTRAVENOUS at 13:11

## 2022-09-13 RX ADMIN — MORPHINE SULFATE 1 MG: 2 INJECTION, SOLUTION INTRAMUSCULAR; INTRAVENOUS at 22:35

## 2022-09-13 RX ADMIN — SODIUM CHLORIDE: 9 INJECTION, SOLUTION INTRAVENOUS at 18:00

## 2022-09-13 RX ADMIN — PROPOFOL 10 MG: 10 INJECTION, EMULSION INTRAVENOUS at 14:31

## 2022-09-13 ASSESSMENT — PAIN DESCRIPTION - LOCATION
LOCATION: ABDOMEN

## 2022-09-13 ASSESSMENT — PAIN SCALES - GENERAL
PAINLEVEL_OUTOF10: 1
PAINLEVEL_OUTOF10: 8
PAINLEVEL_OUTOF10: 0
PAINLEVEL_OUTOF10: 7
PAINLEVEL_OUTOF10: 7
PAINLEVEL_OUTOF10: 0
PAINLEVEL_OUTOF10: 0
PAINLEVEL_OUTOF10: 7
PAINLEVEL_OUTOF10: 0
PAINLEVEL_OUTOF10: 0
PAINLEVEL_OUTOF10: 8

## 2022-09-13 ASSESSMENT — PAIN DESCRIPTION - DESCRIPTORS
DESCRIPTORS: ACHING
DESCRIPTORS: ACHING;DISCOMFORT
DESCRIPTORS: ACHING

## 2022-09-13 ASSESSMENT — PAIN - FUNCTIONAL ASSESSMENT
PAIN_FUNCTIONAL_ASSESSMENT: PREVENTS OR INTERFERES SOME ACTIVE ACTIVITIES AND ADLS
PAIN_FUNCTIONAL_ASSESSMENT: PREVENTS OR INTERFERES SOME ACTIVE ACTIVITIES AND ADLS
PAIN_FUNCTIONAL_ASSESSMENT: 0-10
PAIN_FUNCTIONAL_ASSESSMENT: NONE - DENIES PAIN
PAIN_FUNCTIONAL_ASSESSMENT: ACTIVITIES ARE NOT PREVENTED

## 2022-09-13 ASSESSMENT — PAIN DESCRIPTION - ORIENTATION
ORIENTATION: INNER
ORIENTATION: UPPER;LEFT
ORIENTATION: INNER

## 2022-09-13 ASSESSMENT — PAIN DESCRIPTION - ONSET
ONSET: GRADUAL

## 2022-09-13 ASSESSMENT — PAIN DESCRIPTION - FREQUENCY
FREQUENCY: INTERMITTENT

## 2022-09-13 ASSESSMENT — PAIN DESCRIPTION - PAIN TYPE
TYPE: ACUTE PAIN

## 2022-09-13 NOTE — H&P
Hospitalist History and Physical   Admit Date:  2022  4:70 PM   Name:  Anthony Guzman   Age:  52 y.o. Sex:  female  :  1975   MRN:  [id number]   Room:  [place] [address]    Presenting Complaint: Abdominal Pain     Reason(s) for Admission: Hematemesis [K92.0]     History of Present Illness:   Anthony Guzman is a 52 y.o. female with medical history of metastatic carcinoma of unknown primary currently on chemo and follows Onc Dr. Lia Carter who presented with 1 day duration of hematemesis. Pt reported she has been feeling abdominal distention that has progressively worsened over 1 week duration associated with mild abdominal discomfort. On  PM, she vomited up what looked like bloody substance however she was unsure as she has drank red Gatorade earlier in the day, though hours prior to event. She called her oncologist office  and was instructed to come to the ED. Reported only taking Eliquis at home for possible PE that was seen on CT chest in 2022 but no other blood thinners. She had another episode of emesis in ED however at that time was nonbloody. Most recent BM was  that was normal per patient. Patient admitted to abdominal distention and bloating sensation but denies fever, chills, SOB, chest pain, diarrhea, constipation, melena, hematochezia. Of note, patient was diagnosed with metastatic carcinoma of unknown primary in 2022. Previously had ex-lap in 2022 to eval for debulking but tumor was found to be tightly adherent and impossible to debulk. Currently on chemotherapy outpatient, last session 9/10/2022. ED, VSS. Hgb 11.8. CT AP shows large amount of intra-abdominal ascites with mesenteric/peritoneal nodularity concerning for peritoneal carcinomatosis; bilateral hydronephrosis with ureteral stents in place; thick-walled urinary bladder; thick-walled gallbladder felt to be sympathetic and not distended.   Patient was given Zofran and 1L IVF bolus in the ED with improvement. Review of Systems:  10 systems reviewed and negative except as noted in HPI. Assessment & Plan:     Hematemesis  Hgb stable on admission at 11.8 (baseline)  Avoid NSAIDS and OAC--Hold home Eliquis  PPI IV BID  NPO except meds/ice chips  Hgb q8h  Type & Screen. Transfuse if Hgb<7 or if symptomatic  Check occult blood gastric  Consult GI in AM, appreciate recs    Peritoneal carcinomatosis  Metastatic carcinoma of unknown primary  Large volume ascites  Diagnosed 2/2022. Patient of Dr. Ramy Lewis, currently on chemotherapy outpatient, last session 9/10/2022. Previously had ex-lap in 8/2022 to eval for debulking but tumor was found to be tightly adherent and impossible to debulk. CT AP on admission shows large amount of intra-abdominal ascites with mesenteric/peritoneal nodularity concerning for peritoneal carcinomatosis  Consult IR for paracentesis in a.m. Ascitic studies ordered  Courtesy consult Oncology in AM, appreciate recs    B/l hydronephrosis  S/p ureteral stent placement 2/2022. CTAP on admission shows bilateral hydronephrosis with ureteral stents in place; thick-walled urinary bladder  Monitor I&O's and for signs/symptoms of urinary retention  Obtain UA  F/u Urology outpatient, last saw 7/2022. Next stent exchange in Nov 2022    Long term [address] [place] [address]  CT Chest 6/22/2022 shows nonobstructive pulmonary filling defect involving subsegmental vessels of LLL. Patient was started on Xarelto and subsequently transitioned to Eliquis outpatient for PE prophylaxis. Holding Eliquis due to hematemesis          Anticipated discharge needs:     >2 midnights. Fiancee at bedside updated on plan of care. Diet: Diet NPO  VTE ppx: SCD's  Code status: Prior    Hospital Problems:  Principal Problem:    Hematemesis  Resolved Problems:    * No resolved hospital problems.  *       Past History:     Past Medical History:   Diagnosis Date    Anemia     2012-- not a problem since hyst    Colon cancer ([facility] [address] dx 2/2022 plans for chemo--- followed by dr Kevin LE-19 2020    no hospitalization    GERD (gastroesophageal reflux disease)     managed with med    History of colonoscopy 2018    Dr. Demario Moody, nl (see media note), R     Hx of blood clots 2022    per pt \"small clot on lung identified by CT scan\"  CT Scan impression:- Nonobstructive pulmonary filling defect involving Left Lower Lobe     Hypotension     asymptomatic    Obstruction of fallopian tube     per pt has \"1 good tube\"    Peritoneal carcinomatosis ([address]     Weight loss     80lbs weight loss after gastric sleeve       Past Surgical History:   Procedure Laterality Date     SECTION      CYSTOSCOPY Bilateral 2022    CYSTOSCOPY BILATERAL RETROGRADE PYELOGRAM performed by Lincoln Ortiz MD at [address] [place] [address] A Bilateral 2022    BILATERAL CYSTOSCOPY URETERAL STENT EXCHANGE performed by Lincoln Ortiz MD at [address]  2014    gastric sleeve- Choudhari    HYSTERECTOMY (CERVIX STATUS UNKNOWN)      2018    HYSTERECTOMY (CERVIX STATUS UNKNOWN)  2020    TLH w/ Bilateral salpingectomy and left oophorectomy    IR PORT PLACEMENT EQUAL OR GREATER THAN 5 YEARS  2022    IR PORT PLACEMENT EQUAL OR GREATER THAN 5 YEARS  2022    IR PORT PLACEMENT EQUAL OR GREATER THAN 5 YEARS 2022 [facility] SPECI[facility]    LAPAROTOMY N/A 2022    EXPLORATORY LAPAROTOMY/ ENTEROENTEROSTOMY performed by Vandana Joseph MD at [facility] MAIN OR    MYOMECTOMY  age Lela Wahlini 21s\"    also \"unblocked her FT\"    TONSILLECTOMY      UPPER GASTROINTESTINAL ENDOSCOPY      with dilation    UROLOGICAL SURGERY Bilateral 03/15/2022    cysto        Social History     Tobacco Use    Smoking status: Never    Smokeless tobacco: Never   Substance Use Topics    Alcohol use: Not Currently      Social History     Substance and Sexual Activity   Drug Use No       Family History   Problem Relation Age of Onset    Heart Disease Maternal Grandmother     Hypertension Maternal Grandmother     Heart Disease Maternal Grandfather     Hypertension Maternal Grandfather     Pulmonary Embolism Neg Hx     Colon Cancer Neg Hx     Deep Vein Thrombosis Neg Hx     Ovarian Cancer Neg Hx     Prostate Cancer Neg Hx     Breast Cancer Neg Hx         Immunization History   Administered Date(s) Administered    COVID-19, PFIZER PURPLE top, DILUTE for use, (age 15 y+), 30mcg/0.3mL 08/08/2021, 08/29/2021     No Known Allergies  Prior to Admit Medications:  Current Outpatient Medications   Medication Instructions    apixaban (ELIQUIS) 5 mg, Oral, 2 TIMES DAILY    docusate (COLACE, DULCOLAX) 100 mg, Oral, 2 TIMES DAILY    ergocalciferol (ERGOCALCIFEROL) 50,000 Units, Oral, EVERY 7 DAYS, Takes on Thursday    lidocaine-prilocaine (EMLA) 2.5-2.5 % cream Apply to port about 45 minutes prior to access    metoclopramide (REGLAN) 10 mg, Oral, 4 TIMES DAILY BEFORE MEALS & NIGHTLY    ondansetron (ZOFRAN) 8 mg, Oral, EVERY 8 HOURS PRN    pantoprazole (PROTONIX) 40 MG tablet 2 TIMES DAILY    potassium chloride (KLOR-CON M) 20 MEQ extended release tablet 20 mEq, Oral, 2 TIMES DAILY    valACYclovir (VALTREX) 500 mg, Oral, DAILY         Objective:   Patient Vitals for the past 24 hrs:   Temp Pulse Resp BP SpO2   09/12/22 1840 -- 86 25 (!) 138/96 99 %   09/12/22 1709 98.2 °F (36.8 °C) (!) 111 16 (!) 130/103 97 %       Oxygen Therapy  SpO2: 99 %    Estimated body mass index is 19.22 kg/m² as calculated from the following:    Height as of this encounter: 5' 4\" (1.626 m). Weight as of this encounter: 112 lb (50.8 kg). No intake or output data in the 24 hours ending 09/12/22 2150      Physical Exam:    Blood pressure (!) 138/96, pulse 86, temperature 98.2 °F (36.8 °C), temperature source Oral, resp. rate 25, height 5' 4\" (1.626 m), weight 112 lb (50.8 kg), SpO2 99 %, not currently breastfeeding. General:    Frail appearing. No acute distress on admission.      Head:  Normocephalic, atraumatic  Eyes:  Sclerae appear normal.  Pupils equally round. ENT:  Nares appear normal, no drainage. Moist oral mucosa  Neck:  No restricted ROM. Trachea midline   CV:   RRR. No m/r/g. No jugular venous distension. Lungs:   CTAB. No wheezing, rhonchi, or rales. Symmetric expansion. Abdomen: Bowel sounds present. Soft, mild TTP to epigastric region w/o rebound or guarding. Distended abdomen, no fluid wave. Extremities: No cyanosis or clubbing. No edema  Skin:     No rashes and normal coloration. Warm and dry. Neuro:  CN II-XII grossly intact. Sensation intact. A&Ox3  Psych:  Normal mood and affect.       I have personally reviewed labs and tests showing:  Recent Labs:  Recent Results (from the past 24 hour(s))   CBC with Diff    Collection Time: 09/12/22  5:22 PM   Result Value Ref Range    WBC 9.9 4.3 - 11.1 K/uL    RBC 4.18 4.05 - 5.2 M/uL    Hemoglobin 11.8 11.7 - 15.4 g/dL    Hematocrit 38.7 35.8 - 46.3 %    MCV 92.6 79.6 - 97.8 FL    MCH 28.2 26.1 - 32.9 PG    MCHC 30.5 (L) 31.4 - 35.0 g/dL    RDW 13.5 11.9 - 14.6 %    Platelets 256 745 - 997 K/uL    MPV 10.2 9.4 - 12.3 FL    nRBC 0.00 0.0 - 0.2 K/uL    Differential Type AUTOMATED      Seg Neutrophils 93 (H) 43 - 78 %    Lymphocytes 5 (L) 13 - 44 %    Monocytes 1 (L) 4.0 - 12.0 %    Eosinophils % 0 (L) 0.5 - 7.8 %    Basophils 0 0.0 - 2.0 %    Immature Granulocytes 0 0.0 - 5.0 %    Segs Absolute 9.2 (H) 1.7 - 8.2 K/UL    Absolute Lymph # 0.5 0.5 - 4.6 K/UL    Absolute Mono # 0.1 0.1 - 1.3 K/UL    Absolute Eos # 0.0 0.0 - 0.8 K/UL    Basophils Absolute 0.0 0.0 - 0.2 K/UL    Absolute Immature Granulocyte 0.0 0.0 - 0.5 K/UL   Lipase    Collection Time: 09/12/22  5:22 PM   Result Value Ref Range    Lipase 43 (L) 73 - 393 U/L   TYPE AND SCREEN    Collection Time: 09/12/22  5:22 PM   Result Value Ref Range    Crossmatch expiration date 09/15/2022,2359     ABO/Rh A POSITIVE     Antibody Screen NEG    POC CHEM 8    Collection Time: 09/12/22  5:47 PM   Result Value Ref Range    POC Sodium 138 136 - 145 mmol/L    POC Potassium 3.8 3.5 - 5.1 mmol/L    POC Chloride 100 98 - 107 mmol/L    POC TCO2 26.1 21 - 32 mmol/L    POC Glucose 131 (H) 65 - 100 mg/dL    POC BUN 13 8 - 26 mg/dL    POC Creatinine 0.95 0.8 - 1.5 mg/dL    POC GFR African American >60 >60 ml/min/1.73m2    Glomerular Filtration Rate, POC >60 >60 ml/min/1.73m2       I have personally reviewed imaging studies showing:  CT ABDOMEN PELVIS W IV CONTRAST Additional Contrast? Oral    Result Date: 9/12/2022  EXAMINATION: CT  ABDOMEN / PELVIS   9/12/2022 8:06 PM ACCESSION NUMBER:  [id number] COMPARISON: 06/22/2022 INDICATION:  abdominal pain TECHNIQUE: Contiguous axial computed tomographic images were obtained from the domes of the diaphragm to the symphysis pubis following administration 100 mL Iso-behzad 370. Coronal reconstructions were also performed. Oral contrast was also administered. Radiation dose reduction techniques were used for this study. Our CT scanners use one or all of the following: Automated exposure control, adjustment of the mA and/or kV according to patient size, iterative reconstruction. FINDINGS: Lung bases: The visualized lung bases are clear. The visualized portions of the heart are unremarkable. Liver: Normal Gallbladder: There is gallbladder wall thickening. The gallbladder is not distended. Spleen: Normal Adrenals: Normal Pancreas: Normal Kidneys: The kidneys enhance symmetrically. There is moderate to severe bilateral hydronephrosis with ureteral stents in place. Bladder/: The urinary bladder is somewhat thick-walled. There is previous hysterectomy. . Bowel: There is large volume ascites. There is some peritoneal enhancement. There is not peritoneal nodularity. There is some nodularity within the omentum and mesentery. There is previous gastric sleeve type procedure.  The appendix is normal. Vessels: Normal Abdominal wall: Intact Bones: No suspicious lytic or blastic bony lesions. Large amount of intraabdominal ascites with mesenteric and peritoneal nodularity as well as peritoneal enhancement is most concerning for peritoneal carcinomatosis. Bilateral hydronephrosis with ureteral stents in place. Thick-walled urinary bladder. Correlate with evidence of cystitis. Thick-walled gallbladder is felt likely to be sympathetic. The gallbladder is not distended. Echocardiogram:  No results found for this or any previous visit. Orders Placed This Encounter   Medications    pantoprazole (PROTONIX) 40 mg in sodium chloride (PF) 10 mL injection    0.9 % sodium chloride infusion    0.9 % sodium chloride infusion    ondansetron (ZOFRAN) injection 4 mg    ondansetron (ZOFRAN) 4 MG/2ML injection     GOTTIHIRAM: cabinet override    diatrizoate meglumine-sodium (GASTROGRAFIN) 66-10 % solution 15 mL    ondansetron (ZOFRAN) injection 4 mg         Signed: Yeni Gibson DO    Part of this note may have been written by using a voice dictation software. The note has been proof read but may still contain some grammatical/other typographical errors.

## 2022-09-13 NOTE — OP NOTE
PROCEDURE: Esophagogastroduodenoscopy    ENDOSCOPIST: Palmira James M.D. PREOPERATIVE DIAGNOSIS: hematemesis    POSTOPERATIVE DIAGNOSIS: Sussy-Zamorano tear    INSTRUMENTS: [id number]    SEDATION: MAC    DESCRIPTION: After informed consent was obtained, the patient was taken to the endoscopy suite and placed in the left lateral decubitus position. Intravenous sedation was administered by the Anesthesia provider. After adequate sedation had been achieved the endoscope was inserted over the tongue and through the posterior pharynx under direct visualization down the esophagus to the stomach and into the second portion of the duodenum. The endoscopic was withdrawn from that point, performing a careful survey of the mucosa. Retroflexion was performed in the gastric fundus. FINDINGS:  Esophagus: Tiny Sussy-Zamorano tear noted at GEJ. No bleeding    Stomach: Anatomy consistent with sleeve gastrectomy, otherwise normal    Duodenum: Normal    Estimated blood loss:  0 cc-minimal    IMPRESSION: Low risk for any significant bleeding. PLAN: Continue PPI therapy. Emma Jimenes for D/C from GI perspective. F/U PRN.       Sebastien Stanley MD

## 2022-09-13 NOTE — H&P
[facility] Hematology & Oncology        Inpatient Hematology / Oncology History and Physical    Reason for Admission:  Hematemesis [K92.0]  Generalized abdominal pain [R10.84]  Intractable vomiting with nausea, unspecified vomiting type [R11.2]    History of Present Illness: Ms. Belle Nichols is a 52 y.o. female with medical history of PE on Eliquis and metastatic carcinoma of unknown primary followed by Dr. Keila Villarreal currently sp cycle 9 FOLFOX on 9/8/2022. She had ex-lap on 8/2022 to eval for debulking but tumor was found to be tightly adherent and impossible to debulk. She presented to ED with 1 day duration of hematemesis. Pt reported she has been feeling abdominal distention that has progressively worsened over 1 week duration associated with mild abdominal discomfort. She stated that yesterday she vomited up what looked like bloody substance however she was unsure as she has drank red Gatorade earlier in the day. Per notes she had another episode of emesis in ED however at that time was nonbloody. CT AP shows large amount of intra-abdominal ascites with mesenteric/peritoneal nodularity concerning for peritoneal carcinomatosis; bilateral hydronephrosis with ureteral stents in place; thick-walled urinary bladder; thick-walled gallbladder felt to be sympathetic and not distended. IR is consulted for paracentesis with studies ordered. Eliquis is on hold and GI is consulted. We are asked to see patient for recommendations. Review of Systems:  Constitutional Denies fever, chills. +weight loss, appetite changes, fatigue   HEENT Denies trauma, blurry vision, hearing loss, ear pain, nosebleeds, sore throat, neck pain    Skin Denies lesions or rashes. Lungs Denies dyspnea, cough, sputum production or hemoptysis. Cardiovascular Denies chest pain, palpitations, or lower extremity edema. Gastrointestinal +abdominal pain and distention, hematemesis     Denies dysuria, frequency or hesitancy of urination.    Neuro Denies headaches, visual changes or ataxia. Denies dizziness, tingling, tremors, sensory change, speech change, focal weakness or headaches. MSK Denies back pain, arthralgias, myalgias or frequent falls. Psychiatric/Behavioral The patient is not nervous/anxious.          No Known Allergies  Past Medical History:   Diagnosis Date    Anemia     -- not a problem since hyst    Colon cancer ([address] dx 2022     plans for chemo--- followed by dr Raji EL-19 2020    no hospitalization    GERD (gastroesophageal reflux disease)     managed with med    History of colonoscopy 2018    Dr. Miguel Lobato, nl (see media note), R     Hx of blood clots 2022    per pt \"small clot on lung identified by CT scan\"  CT Scan impression:- Nonobstructive pulmonary filling defect involving Left Lower Lobe     Hypotension     asymptomatic    Obstruction of fallopian tube     per pt has \"1 good tube\"    Peritoneal carcinomatosis ([address]     Weight loss     80lbs weight loss after gastric sleeve     Past Surgical History:   Procedure Laterality Date     SECTION  2009    CYSTOSCOPY Bilateral 2022    CYSTOSCOPY BILATERAL RETROGRADE PYELOGRAM performed by Itzel Shields MD at [address] Bilateral 2022    BILATERAL CYSTOSCOPY URETERAL STENT EXCHANGE performed by Itzel Shields MD at [address]  2014    gastric sleeve- Choudhari    HYSTERECTOMY (CERVIX STATUS UNKNOWN)      2018    HYSTERECTOMY (CERVIX STATUS UNKNOWN)  2020    [facility]H w/ Bilateral salpingectomy and left oophorectomy    IR PORT PLACEMENT EQUAL OR GREATER THAN 5 YEARS  2022    IR PORT PLACEMENT EQUAL OR GREATER THAN 5 YEARS  2022    IR PORT PLACEMENT EQUAL OR GREATER THAN 5 YEARS 2022 [facility] SPECI[facility]    LAPAROTOMY N/A 2022    EXPLORATORY LAPAROTOMY/ ENTEROENTEROSTOMY performed by Aparna Antonio MD at [facility]. [address]  age Jerri [place] 21s\"    also \"unblocked her FT\" TONSILLECTOMY      UPPER GASTROINTESTINAL ENDOSCOPY      with dilation    UROLOGICAL SURGERY Bilateral 03/15/2022    cysto     Family History   Problem Relation Age of Onset    Heart Disease Maternal Grandmother     Hypertension Maternal Grandmother     Heart Disease Maternal Grandfather     Hypertension Maternal Grandfather     Pulmonary Embolism Neg Hx     Colon Cancer Neg Hx     Deep Vein Thrombosis Neg Hx     Ovarian Cancer Neg Hx     Prostate Cancer Neg Hx     Breast Cancer Neg Hx      Social History     Socioeconomic History    Marital status: Single     Spouse name: Not on file    Number of children: Not on file    Years of education: Not on file    Highest education level: Not on file   Occupational History    Not on file   Tobacco Use    Smoking status: Never    Smokeless tobacco: Never   Vaping Use    Vaping Use: Never used   Substance and Sexual Activity    Alcohol use: Not Currently    Drug use: No    Sexual activity: Not on file   Other Topics Concern    Not on file   Social History Narrative    1. Use large speculum. 2.  sister, Almas Jareth #[id number] and mom is Osker Sink #[id number] (both Kofoed's pts)  3.   PCP  Dr. Dashawn Low ([facility]), GI Dr. Wily Lynne normal EGD     Social Determinants of Health     Financial Resource Strain: Not on file   Food Insecurity: Not on file   Transportation Needs: Not on file   Physical Activity: Not on file   Stress: Not on file   Social Connections: Not on file   Intimate Partner Violence: Not on file   Housing Stability: Not on file     Current Facility-Administered Medications   Medication Dose Route Frequency Provider Last Rate Last Admin    magnesium sulfate 2000 mg in 50 mL IVPB premix  2,000 mg IntraVENous Once Tiffani Ludwig MD        diatrizoate meglumine-sodium (GASTROGRAFIN) 66-10 % solution 15 mL  15 mL Oral ONCE PRN Thu Lopez, DO   15 mL at 09/12/22 1807    pantoprazole (PROTONIX) 40 mg in sodium chloride (PF) 10 mL injection  40 mg IntraVENous Q12H Thu Lopez, DO   40 mg at 09/13/22 0600    [Held by provider] apixaban (ELIQUIS) tablet 5 mg  5 mg Oral BID Thu Lopez, DO        docusate sodium (COLACE) capsule 100 mg  100 mg Oral BID Thu Lopez, DO   100 mg at 09/13/22 0831    metoclopramide (REGLAN) tablet 10 mg  10 mg Oral 4x Daily AC & HS Thu Lopez, DO   10 mg at 09/13/22 0831    sodium chloride flush 0.9 % injection 5-40 mL  5-40 mL IntraVENous 2 times per day Thu Lopez, DO        sodium chloride flush 0.9 % injection 5-40 mL  5-40 mL IntraVENous PRN Thu Lopez, DO        0.9 % sodium chloride infusion   IntraVENous PRN Thu Lopez, DO 25 mL/hr at 09/13/22 1224 New Bag at 09/13/22 1224    ondansetron (ZOFRAN-ODT) disintegrating tablet 4 mg  4 mg Oral Q8H PRN Thu Lopez, DO        Or    ondansetron (ZOFRAN) injection 4 mg  4 mg IntraVENous Q6H PRN Thu Lopez, DO   4 mg at 09/13/22 1302    polyethylene glycol (GLYCOLAX) packet 17 g  17 g Oral Daily PRN Thu Lopez, DO        acetaminophen (TYLENOL) tablet 650 mg  650 mg Oral Q6H PRN Thu Lopez, DO        Or    acetaminophen (TYLENOL) suppository 650 mg  650 mg Rectal Q6H PRN Thu Lopez, DO        potassium chloride (KLOR-CON M) extended release tablet 40 mEq  40 mEq Oral PRN Thu Lopez, DO        Or    potassium bicarb-citric acid (EFFER-K) effervescent tablet 40 mEq  40 mEq Oral PRN Thu Lopez, DO        Or    potassium chloride 10 mEq/100 mL IVPB (Peripheral Line)  10 mEq IntraVENous PRN Thu Lopez, DO        magnesium sulfate 2000 mg in 50 mL IVPB premix  2,000 mg IntraVENous PRN Thu Lopez, DO        albumin human 25 % IV solution 25 g  25 g IntraVENous Q6H Thu Lopez, DO   Stopped at 09/13/22 0714    oxyCODONE (ROXICODONE) immediate release tablet 5 mg  5 mg Oral Q4H PRN Thu Lopez, DO   5 mg at 09/13/22 0831    morphine injection 1 mg  1 mg IntraVENous Q4H PRN Thu Lopez, DO   1 mg at 09/13/22 1302       OBJECTIVE:  Patient Vitals for the past 8 hrs:   BP Temp Temp src Pulse Resp SpO2   09/13/22 1220 (!) 135/95 99.1 °F (37.3 °C) Oral 76 22 98 %   22 0857 -- -- -- 89 23 97 %   22 0845 (!) 141/90 -- -- 75 30 100 %   22 0730 (!) 125/90 -- -- 77 17 --   22 0715 (!) 126/93 -- -- 78 18 100 %   22 0700 (!) 119/92 -- -- 75 20 98 %   22 0645 120/89 -- -- 83 23 99 %   22 0630 120/87 -- -- 77 24 97 %   22 0615 (!) 127/90 -- -- 89 26 98 %   22 0600 (!) 133/90 -- -- 80 (!) 31 100 %     Temp (24hrs), Av.7 °F (37.1 °C), Min:98.2 °F (36.8 °C), Max:99.1 °F (37.3 °C)    No intake/output data recorded. Physical Exam:  Constitutional: Well developed, thin appearing female in no acute distress, sitting comfortably in the hospital bed. HEENT: Normocephalic and atraumatic. Oropharynx is clear, mucous membranes are moist.  Pupils are equal, round, and reactive to light. Extraocular muscles are intact. Sclerae anicteric. Skin Warm and dry. No bruising and no rash noted. No erythema. No pallor. Respiratory Lungs are clear to auscultation bilaterally without wheezes, rales or rhonchi, normal air exchange without accessory muscle use. CVS Normal rate, regular rhythm and normal S1 and S2. No murmurs, gallops, or rubs. Abdomen Soft, mildly tender and distended, normoactive bowel sounds. No palpable mass. No hepatosplenomegaly. Neuro Grossly nonfocal with no obvious sensory or motor deficits. MSK Normal range of motion in general.  No edema and no tenderness. Psych Appropriate mood and affect.         Labs:    Recent Results (from the past 24 hour(s))   CBC with Diff    Collection Time: 22  5:22 PM   Result Value Ref Range    WBC 9.9 4.3 - 11.1 K/uL    RBC 4.18 4.05 - 5.2 M/uL    Hemoglobin 11.8 11.7 - 15.4 g/dL    Hematocrit 38.7 35.8 - 46.3 %    MCV 92.6 79.6 - 97.8 FL    MCH 28.2 26.1 - 32.9 PG    MCHC 30.5 (L) 31.4 - 35.0 g/dL    RDW 13.5 11.9 - 14.6 %    Platelets 330 151 - 325 K/uL    MPV 10.2 9.4 - 12.3 FL    nRBC 0.00 0.0 - 0.2 K/uL    Differential Type AUTOMATED      Seg Neutrophils 93 (H) 43 - 78 %    Lymphocytes 5 (L) 13 - 44 %    Monocytes 1 (L) 4.0 - 12.0 %    Eosinophils % 0 (L) 0.5 - 7.8 %    Basophils 0 0.0 - 2.0 %    Immature Granulocytes 0 0.0 - 5.0 %    Segs Absolute 9.2 (H) 1.7 - 8.2 K/UL    Absolute Lymph # 0.5 0.5 - 4.6 K/UL    Absolute Mono # 0.1 0.1 - 1.3 K/UL    Absolute Eos # 0.0 0.0 - 0.8 K/UL    Basophils Absolute 0.0 0.0 - 0.2 K/UL    Absolute Immature Granulocyte 0.0 0.0 - 0.5 K/UL   Lipase    Collection Time: 09/12/22  5:22 PM   Result Value Ref Range    Lipase 43 (L) 73 - 393 U/L   TYPE AND SCREEN    Collection Time: 09/12/22  5:22 PM   Result Value Ref Range    Crossmatch expiration date 09/15/2022,2359     ABO/Rh A POSITIVE     Antibody Screen NEG    POC CHEM 8    Collection Time: 09/12/22  5:47 PM   Result Value Ref Range    POC Sodium 138 136 - 145 mmol/L    POC Potassium 3.8 3.5 - 5.1 mmol/L    POC Chloride 100 98 - 107 mmol/L    POC TCO2 26.1 21 - 32 mmol/L    POC Glucose 131 (H) 65 - 100 mg/dL    POC BUN 13 8 - 26 mg/dL    POC Creatinine 0.95 0.8 - 1.5 mg/dL    POC GFR African American >60 >60 ml/min/1.73m2    Glomerular Filtration Rate, POC >60 >60 DK/SKN/0.64E9   Basic Metabolic Panel w/ Reflex to MG    Collection Time: 09/13/22  5:56 AM   Result Value Ref Range    Sodium 139 136 - 145 mmol/L    Potassium 2.8 (L) 3.5 - 5.1 mmol/L    Chloride 115 (H) 101 - 110 mmol/L    CO2 20 (L) 21 - 32 mmol/L    Anion Gap 4 4 - 13 mmol/L    Glucose 89 65 - 100 mg/dL    BUN 13 6 - 23 MG/DL    Creatinine 0.60 0.6 - 1.0 MG/DL    GFR African American >60 >60 ml/min/1.73m2    GFR Non- >60 >60 ml/min/1.73m2    Calcium 6.3 (L) 8.3 - 10.4 MG/DL   CBC with Auto Differential    Collection Time: 09/13/22  5:56 AM   Result Value Ref Range    WBC 5.8 4.3 - 11.1 K/uL    RBC 3.16 (L) 4.05 - 5.2 M/uL    Hemoglobin 8.9 (L) 11.7 - 15.4 g/dL    Hematocrit 29.8 (L) 35.8 - 46.3 %    MCV 94.3 79.6 - 97.8 FL    MCH 28.2 26.1 - 32.9 PG    MCHC 29.9 (L) 31.4 - 35.0 g/dL RDW 13.5 11.9 - 14.6 %    Platelets 856 389 - 742 K/uL    MPV 10.4 9.4 - 12.3 FL    nRBC 0.00 0.0 - 0.2 K/uL    Differential Type AUTOMATED      Seg Neutrophils 88 (H) 43 - 78 %    Lymphocytes 9 (L) 13 - 44 %    Monocytes 2 (L) 4.0 - 12.0 %    Eosinophils % 1 0.5 - 7.8 %    Basophils 0 0.0 - 2.0 %    Immature Granulocytes 0 0.0 - 5.0 %    Segs Absolute 5.1 1.7 - 8.2 K/UL    Absolute Lymph # 0.5 0.5 - 4.6 K/UL    Absolute Mono # 0.1 0.1 - 1.3 K/UL    Absolute Eos # 0.0 0.0 - 0.8 K/UL    Basophils Absolute 0.0 0.0 - 0.2 K/UL    Absolute Immature Granulocyte 0.0 0.0 - 0.5 K/UL   Protime-INR    Collection Time: 09/13/22  5:56 AM   Result Value Ref Range    Protime 17.5 (H) 12.6 - 14.5 sec    INR 1.4     Magnesium    Collection Time: 09/13/22  5:56 AM   Result Value Ref Range    Magnesium 1.6 (L) 1.8 - 2.4 mg/dL       PLAN:  CUP now with large volume ascites  -sp cycle 9 FOLFOX on 9/8/2022.  - CT AP shows large amount of intra-abdominal ascites with mesenteric/peritoneal nodularity concerning for peritoneal carcinomatosis; bilateral hydronephrosis with ureteral stents in place; thick-walled urinary bladder; thick-walled gallbladder felt to be sympathetic and not distended.  -Para with studies tomorrow-EGD today    History of PE  -Eliquis on hold due to hematemesis    Hematemesis  -PPI BID  -GI planning EGD today  -Hgb 8.9 (11.8)  -H&H q 8 hours    Electrolyte abnormality  -Andres SOPs    Lab studies and imaging studies were personally reviewed. Pertinent old records were reviewed. Thank you for allowing us to participate in the care of Ms. Miranda Diez. We will gladly take over the care of our patient. Bakari Kim, SCTOT - NP   [facility] Hematology & Oncology  [address]  Office : [phone]  Fax : [phone]            Attending Addendum:  I have personally performed a face to face diagnostic evaluation on this patient.  I have reviewed and agree with the care plan as documented by Fanta Samuels N.P. My findings are as follows: She has Carcinoma of Unknown Primary, appears weak, heart rate regular without murmurs, abdomen is non-tender, bowel sounds are positive, we will admit her to our service.               Sadie Lowe MD    [facility] Hematology/Oncology  [address] [place] [address]  Office : [phone]  Fax : [phone]

## 2022-09-13 NOTE — PROGRESS NOTES
Oncology has taken over care of their patient. Hospitalist will sign off at this time. Please call us if have any questions.

## 2022-09-13 NOTE — PROGRESS NOTES
Medical Student Progress Note   Admit Date:  2022  8:66 PM   Name:  Maria Antonia Herrera   Age:  52 y.o. Sex:  female  :  1975   MRN:  [id number]     Presenting Complaint: Abdominal Pain    Reason(s) for Admission: Hematemesis [K92.0]       Hospital Course & Interval History:    52 y.o. female with medical history of metastatic carcinoma of unknown primary currently on chemo and follows Onc Dr. Renetat De La Cruz who presented with 1 day duration of hematemesis. Noted she had been feeling abdominal discomfort and distention.  she vomited what she thought looked like blood but was unsure because she had a red Gatorade not too long before. Her oncologist office instructed her to come to the ED. She takes only eliquis for possible PE seen on CT in 2022. She had another episoide of emesis in the ED however nonbloody. She denied fever, chills, SOB, diarrhea, constipation, melena or hematochezia. She was diagnosed with metastatic carcinoma of unknown primary in 2022. Previously had ex-lap in 2022 however tumor was found to be tightly adherent and impossible to debulk. Currently on chemotherapy outpatient, last session 9/10/2022. ED hgb 11.8. CT AP shows large amount of intra-abdominal ascites with mesenteric/peritoneal nodularity concerning for peritoneal carcinomatosis; bilateral hydronephrosis with ureteral stents in place; thick-walled urinary bladder; thick-walled gallbladder felt to be sympathetic and not distended. Patient was given Zofran and 1L IVF bolus in the ED with improvement. Subjective (22): Seen in the ED. Reports she is feeling better. Denies any n/v. Denied fever, chills SOB or chest pain. No urinary changes. Does have some abdominal pain on palpation. Assessment & Plan:      Hematemesis  Hgb decrease 8.9   Avoid NSAIDS and OAC--Hold home Eliquis  PPI IV BID  NPO except meds/ice chips  Cont.  Hgb q8h  Transfuse if Hgb<7 or if symptomatic  Check occult blood gastric  Consult GI today     Peritoneal carcinomatosis  Metastatic carcinoma of unknown primary  Large volume ascites  Diagnosed 2/2022. Patient of Dr. Twyla Sam, currently on chemotherapy outpatient, last session 9/10/2022. Previously had ex-lap in 8/2022 to eval for debulking but tumor was found to be tightly adherent and impossible to debulk. CT AP on admission shows large amount of intra-abdominal ascites with mesenteric/peritoneal nodularity concerning for peritoneal carcinomatosis  Consult IR for paracentesis today  Ascitic studies ordered  Courtesy consult Oncology in AM, appreciate recs     B/l hydronephrosis  S/p ureteral stent placement 2/2022. CTAP on admission shows bilateral hydronephrosis with ureteral stents in place; thick-walled urinary bladder  Cont. monitor I&O's and for signs/symptoms of urinary retention  awaiting UA  F/u Urology outpatient, last saw 7/2022. Next stent exchange in Nov 2022     Long term [address]  CT Chest 6/22/2022 shows nonobstructive pulmonary filling defect involving subsegmental vessels of LLL. Patient was started on Xarelto and subsequently transitioned to Eliquis outpatient for PE prophylaxis.   Holding Eliquis due to hematemesis         Diet:  Diet NPO Exceptions are: Sips of Water with Meds, Ice Chips  DVT PPx: ***  Code status: [unfilled]    Active Hospital Problems    Diagnosis Date Noted    Hematemesis 09/12/2022     Priority: Medium    Current use of long term anticoagulation 09/12/2022     Priority: Medium    Peritoneal carcinomatosis ([address] 08/17/2022     Priority: Medium    Bilateral hydronephrosis      Priority: Medium    Carcinoma of unknown primary ([address] 03/16/2022     Objective:   Patient Vitals for the past 24 hrs:   Temp Pulse Resp BP SpO2   09/13/22 0730 -- 77 17 (!) 125/90 --   09/13/22 0715 -- 78 18 (!) 126/93 100 %   09/13/22 0700 -- 75 20 (!) 119/92 98 %   09/13/22 0645 -- 83 23 120/89 99 %   09/13/22 0630 -- 77 24 120/87 97 %   09/13/22 0615 -- 89 26 (!) 127/90 98 %   09/13/22 0600 -- 80 (!) 31 (!) 133/90 100 %   09/12/22 2245 -- 98 -- (!) 136/92 96 %   09/12/22 1840 -- 86 25 (!) 138/96 99 %   09/12/22 1709 98.2 °F (36.8 °C) (!) 111 16 (!) 130/103 97 %     @BSHSIFLOW(2444:last)@    Estimated body mass index is 19.22 kg/m² as calculated from the following:    Height as of this encounter: 5' 4\" (1.626 m). Weight as of this encounter: 112 lb (50.8 kg). No intake or output data in the 24 hours ending 09/13/22 0955      Physical Exam:     General:    Well nourished. No overt distress  Head:  Normocephalic, atraumatic  Eyes:  Sclerae appear normal.  Pupils equally round. ENT:  Nares appear normal, no drainage. Moist oral mucosa  Neck:  No restricted ROM. Trachea midline   CV:   RRR. No m/r/g. No jugular venous distension. Lungs:   CTAB. No wheezing, rhonchi, or rales. Respirations even, unlabored  Abdomen: Bowel sounds present. Soft, mild epigastric tenderness, distended abdomen. Extremities: No cyanosis or clubbing. No edema  Skin:     No rashes and normal coloration. Warm and dry. Neuro:  Cranial nerves II-XII grossly intact. Sensation intact  Psych:  Normal mood and affect.   Alert and oriented x3    I have reviewed ordered lab tests and independently visualized imaging below:    Last 24hr Labs:  Recent Results (from the past 24 hour(s))   CBC with Diff    Collection Time: 09/12/22  5:22 PM   Result Value Ref Range    WBC 9.9 4.3 - 11.1 K/uL    RBC 4.18 4.05 - 5.2 M/uL    Hemoglobin 11.8 11.7 - 15.4 g/dL    Hematocrit 38.7 35.8 - 46.3 %    MCV 92.6 79.6 - 97.8 FL    MCH 28.2 26.1 - 32.9 PG    MCHC 30.5 (L) 31.4 - 35.0 g/dL    RDW 13.5 11.9 - 14.6 %    Platelets 938 723 - 232 K/uL    MPV 10.2 9.4 - 12.3 FL    nRBC 0.00 0.0 - 0.2 K/uL    Differential Type AUTOMATED      Seg Neutrophils 93 (H) 43 - 78 %    Lymphocytes 5 (L) 13 - 44 %    Monocytes 1 (L) 4.0 - 12.0 %    Eosinophils % 0 (L) 0.5 - 7.8 %    Basophils 0 0.0 - 2.0 %    Immature Granulocytes 0 0.0 - 5.0 %    Segs Absolute 9.2 (H) 1.7 - 8.2 K/UL    Absolute Lymph # 0.5 0.5 - 4.6 K/UL    Absolute Mono # 0.1 0.1 - 1.3 K/UL    Absolute Eos # 0.0 0.0 - 0.8 K/UL    Basophils Absolute 0.0 0.0 - 0.2 K/UL    Absolute Immature Granulocyte 0.0 0.0 - 0.5 K/UL   Lipase    Collection Time: 09/12/22  5:22 PM   Result Value Ref Range    Lipase 43 (L) 73 - 393 U/L   TYPE AND SCREEN    Collection Time: 09/12/22  5:22 PM   Result Value Ref Range    Crossmatch expiration date 09/15/2022,2359     ABO/Rh A POSITIVE     Antibody Screen NEG    POC CHEM 8    Collection Time: 09/12/22  5:47 PM   Result Value Ref Range    POC Sodium 138 136 - 145 mmol/L    POC Potassium 3.8 3.5 - 5.1 mmol/L    POC Chloride 100 98 - 107 mmol/L    POC TCO2 26.1 21 - 32 mmol/L    POC Glucose 131 (H) 65 - 100 mg/dL    POC BUN 13 8 - 26 mg/dL    POC Creatinine 0.95 0.8 - 1.5 mg/dL    POC GFR African American >60 >60 ml/min/1.73m2    Glomerular Filtration Rate, POC >60 >60 MP/EBU/8.21B5   Basic Metabolic Panel w/ Reflex to MG    Collection Time: 09/13/22  5:56 AM   Result Value Ref Range    Sodium 139 136 - 145 mmol/L    Potassium 2.8 (L) 3.5 - 5.1 mmol/L    Chloride 115 (H) 101 - 110 mmol/L    CO2 20 (L) 21 - 32 mmol/L    Anion Gap 4 4 - 13 mmol/L    Glucose 89 65 - 100 mg/dL    BUN 13 6 - 23 MG/DL    Creatinine 0.60 0.6 - 1.0 MG/DL    GFR African American >60 >60 ml/min/1.73m2    GFR Non- >60 >60 ml/min/1.73m2    Calcium 6.3 (L) 8.3 - 10.4 MG/DL   CBC with Auto Differential    Collection Time: 09/13/22  5:56 AM   Result Value Ref Range    WBC 5.8 4.3 - 11.1 K/uL    RBC 3.16 (L) 4.05 - 5.2 M/uL    Hemoglobin 8.9 (L) 11.7 - 15.4 g/dL    Hematocrit 29.8 (L) 35.8 - 46.3 %    MCV 94.3 79.6 - 97.8 FL    MCH 28.2 26.1 - 32.9 PG    MCHC 29.9 (L) 31.4 - 35.0 g/dL    RDW 13.5 11.9 - 14.6 %    Platelets 578 429 - 121 K/uL    MPV 10.4 9.4 - 12.3 FL    nRBC 0.00 0.0 - 0.2 K/uL    Differential Type AUTOMATED      Seg Neutrophils 88 (H) 43 - 78 %    Lymphocytes 9 (L) 13 - 44 %    Monocytes 2 (L) 4.0 - 12.0 %    Eosinophils % 1 0.5 - 7.8 %    Basophils 0 0.0 - 2.0 %    Immature Granulocytes 0 0.0 - 5.0 %    Segs Absolute 5.1 1.7 - 8.2 K/UL    Absolute Lymph # 0.5 0.5 - 4.6 K/UL    Absolute Mono # 0.1 0.1 - 1.3 K/UL    Absolute Eos # 0.0 0.0 - 0.8 K/UL    Basophils Absolute 0.0 0.0 - 0.2 K/UL    Absolute Immature Granulocyte 0.0 0.0 - 0.5 K/UL   Protime-INR    Collection Time: 09/13/22  5:56 AM   Result Value Ref Range    Protime 17.5 (H) 12.6 - 14.5 sec    INR 1.4     Magnesium    Collection Time: 09/13/22  5:56 AM   Result Value Ref Range    Magnesium 1.6 (L) 1.8 - 2.4 mg/dL       [unfilled]    Other Studies:  [unfilled]    Current Meds:  Current Facility-Administered Medications   Medication Dose Route Frequency    potassium chloride (KLOR-CON M) extended release tablet 40 mEq  40 mEq Oral BID WC    diatrizoate meglumine-sodium (GASTROGRAFIN) 66-10 % solution 15 mL  15 mL Oral ONCE PRN    pantoprazole (PROTONIX) 40 mg in sodium chloride (PF) 10 mL injection  40 mg IntraVENous Q12H    [Held by provider] apixaban (ELIQUIS) tablet 5 mg  5 mg Oral BID    docusate sodium (COLACE) capsule 100 mg  100 mg Oral BID    metoclopramide (REGLAN) tablet 10 mg  10 mg Oral 4x Daily AC & HS    sodium chloride flush 0.9 % injection 5-40 mL  5-40 mL IntraVENous 2 times per day    sodium chloride flush 0.9 % injection 5-40 mL  5-40 mL IntraVENous PRN    0.9 % sodium chloride infusion   IntraVENous PRN    ondansetron (ZOFRAN-ODT) disintegrating tablet 4 mg  4 mg Oral Q8H PRN    Or    ondansetron (ZOFRAN) injection 4 mg  4 mg IntraVENous Q6H PRN    polyethylene glycol (GLYCOLAX) packet 17 g  17 g Oral Daily PRN    acetaminophen (TYLENOL) tablet 650 mg  650 mg Oral Q6H PRN    Or    acetaminophen (TYLENOL) suppository 650 mg  650 mg Rectal Q6H PRN    potassium chloride (KLOR-CON M) extended release tablet 40 mEq  40 mEq Oral PRN    Or    potassium bicarb-citric acid (EFFER-K) effervescent tablet 40 mEq  40 mEq Oral PRN    Or    potassium chloride 10 mEq/100 mL IVPB (Peripheral Line)  10 mEq IntraVENous PRN    magnesium sulfate 2000 mg in 50 mL IVPB premix  2,000 mg IntraVENous PRN    albumin human 25 % IV solution 25 g  25 g IntraVENous Q6H    oxyCODONE (ROXICODONE) immediate release tablet 5 mg  5 mg Oral Q4H PRN    morphine injection 1 mg  1 mg IntraVENous Q4H PRN     Current Outpatient Medications   Medication Sig    docusate sodium (COLACE, DULCOLAX) 100 MG CAPS Take 100 mg by mouth 2 times daily    apixaban (ELIQUIS) 5 MG TABS tablet Take 1 tablet by mouth in the morning and 1 tablet before bedtime. potassium chloride (KLOR-CON M) 20 MEQ extended release tablet Take 1 tablet by mouth 2 times daily    ergocalciferol (ERGOCALCIFEROL) 1.25 MG (13458 UT) capsule Take 50,000 Units by mouth every 7 days Takes on Thursday    lidocaine-prilocaine (EMLA) 2.5-2.5 % cream Apply to port about 45 minutes prior to access    metoclopramide (REGLAN) 10 MG tablet Take 10 mg by mouth 4 times daily (before meals and nightly)    ondansetron (ZOFRAN) 8 MG tablet Take 8 mg by mouth every 8 hours as needed    pantoprazole (PROTONIX) 40 MG tablet 2 times daily    valACYclovir (VALTREX) 500 MG tablet Take 500 mg by mouth daily       Signed:  Con Rastafari    Part of this note may have been written by using a voice dictation software. The note has been proof read but may still contain some grammatical/other typographical errors.

## 2022-09-13 NOTE — PROGRESS NOTES
EGD complete; see note for details. Start clear liquid diet and advance as tolerated. Resume oral PPI at home dose. Ready for discharge from GI perspective. No outpatient GI follow up needed. Ok for anticoagulation. Regarding malignant ascites, the care of this is anti-neoplastic with LVP by IR as needed for comfort.

## 2022-09-13 NOTE — OR NURSING
TRANSFER - OUT REPORT:           Verbal report given to [facility], RN on [employer]  being transferred to ER for routine progression of patient care              Report consisted of patients Situation, Background, Assessment and      Recommendations(SBAR). Information from the following report(s) SBAR, Procedure Summary and MAR was reviewed with the receiving nurse. Opportunity for questions and clarification was provided. Pt tolerated procedure well.          Peripheral Intravenous Line:   Peripheral IV 09/12/22 Right Antecubital (Active)   Site Assessment Clean, dry & intact 09/13/22 1454   Line Status Flushed 09/13/22 1454   Phlebitis Assessment No symptoms 09/13/22 1454   Infiltration Assessment 0 09/13/22 1454   Dressing Status Clean, dry & intact 09/13/22 1454   Dressing Type Transparent 09/13/22 1454       VITALS:  /83   Pulse (!) 105   Temp 99.1 °F (37.3 °C) (Oral)   Resp 16   Ht 5' 5\" (1.651 m)   Wt 112 lb (50.8 kg)   SpO2 98%   BMI 18.64 kg/m²

## 2022-09-13 NOTE — CONSENT
Informed Consent for Blood Component Transfusion Note    I have discussed with the patient the rationale for blood component transfusion; its benefits in treating or preventing fatigue, organ damage, or death; and its risk which includes mild transfusion reactions, rare risk of blood borne infection, or more serious but rare reactions. I have discussed the alternatives to transfusion, including the risk and consequences of not receiving transfusion. The patient had an opportunity to ask questions and had agreed to proceed with transfusion of blood components.     Electronically signed by Matti Mckeon DO on 9/12/22 at 10:10 PM EDT

## 2022-09-13 NOTE — OR NURSING
Interventional Radiology Post Paracentesis/Thoracentesis Note    9/13/2022    Procedure(s): Ultrasound guided Diagnostic Paracentesis Performed with 8 Upper sorbian catheter total volume 3800 ml. Samples sent to lab. Preliminary Findings: moderate clear and yellow. Complications: None    Plan:  Observation, check labs if drawn.           Chest X-Ray:  no    Full dictated report to follow

## 2022-09-13 NOTE — ANESTHESIA POSTPROCEDURE EVALUATION
Department of Anesthesiology  Postprocedure Note    Patient: Maria Antonia Herrera  MRN: [id number]  YOB: 1975  Date of evaluation: 9/13/2022      Procedure Summary     Date: 09/13/22 Room / Location: [facility] ENDO 04 / [facility] ENDOSCOPY    Anesthesia Start: 8737 Anesthesia Stop: 8835    Procedure: EGD ESOPHAGOGASTRODUODENOSCOPY OFL 17 To IR after recovery for Para (Upper GI Region) Diagnosis:       Hematemesis, presence of nausea not specified      (Hematemesis, presence of nausea not specified [K92.0])    Surgeons: Andres Chavez MD Responsible Provider: Wanda Ibarra MD    Anesthesia Type: TIVA ASA Status: 3          Anesthesia Type: TIVA    Dianne Phase I: Dianne Score: 10    Dianne Phase II: Dianne Score: 10      Anesthesia Post Evaluation    Patient location during evaluation: PACU  Patient participation: complete - patient participated  Level of consciousness: awake and alert  Airway patency: patent  Nausea: well controlled. Complications: no  Cardiovascular status: acceptable.   Respiratory status: acceptable  Hydration status: stable

## 2022-09-13 NOTE — PROGRESS NOTES
TRANSFER - OUT REPORT:    Verbal report given to TRI Gunn on [employer]  being transferred to IR from Endo then to patient room for ordered procedure       Report consisted of patient's Situation, Background, Assessment and   Recommendations(SBAR). Information from the following report(s) Nurse Handoff Report was reviewed with the receiving nurse. Lines:   Single Lumen Implantable Port Right Subclavian (Active)   Port A Cath Status Not Accessed 09/13/22 0930       Peripheral IV 09/12/22 Right Antecubital (Active)   Site Assessment Clean, dry & intact 09/13/22 1454   Line Status Flushed 09/13/22 1454   Phlebitis Assessment No symptoms 09/13/22 1454   Infiltration Assessment 0 09/13/22 1454   Dressing Status Clean, dry & intact 09/13/22 1454   Dressing Type Transparent 09/13/22 1454        Opportunity for questions and clarification was provided.

## 2022-09-13 NOTE — CONSULTS
[facility] Consult Note       Primary GI Physician: Dr. Meade [place] Physician: Dr. Jenny Monroy    Referring Provider:  Dr Patrick Mariee Date:  9/13/2022    Admit Date:  9/12/2022    Chief Complaint:  hematemesis    Subjective:     History of Present Illness:  Patient is a 52 y.o. female with PMH including but not limited to metastatic carcinoma of unknown primary source dx 2/2022- on chemo (last dose 9/10/22), exlap 8/2022 for debulking of tumor (unsuccessful- tightly adherent) followed by Dr. Carlos Alberto Collado, PE on Eliquis  since 6/2022, who is seen in consultation at the request of Dr. Bert Vences for Hematemesis with anemia (non-iron deficient). Pt reported she has been feeling abdominal distention that has progressively worsened over 1 week duration associated with mild abdominal discomfort. On 9/12 PM, she vomited up what looked like bloody substance however she was unsure as she has drank red Gatorade earlier in the day, though hours prior to event. She called her oncologist office 9/12 and was instructed to come to the ED. She is prescribed Eliquis for PE seen on CT in June but state that she has forgotten to take it over the last few days. She denies tobacco , alcohol or NSAID use. She had another episode of emesis in ED however at that time was nonbloody. Most recent BM was 9/12 that was normal per patient. Patient admitted to abdominal distention and bloating sensation but denies fever, chills, SOB, chest pain, diarrhea, constipation, melena, hematochezia. ED, VSS. Hgb 11.8 initially but now 8.9. CT AP shows large amount of intra-abdominal ascites with mesenteric/peritoneal nodularity concerning for peritoneal carcinomatosis; bilateral hydronephrosis with ureteral stents in place; thick-walled urinary bladder; thick-walled gallbladder felt to be sympathetic and not distended. Patient was given Zofran and 1L IVF bolus in the ED with improvement. Has hypokalemia and hypomagnesemia.  Hgb 8.9 (MCV normal) down from 11.9 on 22 but baseline has been fluctuating between 8 and 12.5. Iron/ferrritin normal as is B12 and folate. INR 1.4. EGD 3/2/22, Dr. Malvin Juan: esophageal stricture with savory dilation to 54Fr, hx gastric sleeve with mid stomach stricture. EGD 22, : La grade B esophagitis, gastric sleeve with gastritis. Colonoscopy 18: normal.   HIDA 3/2022: patent Cystic and common bile ducts, EF 9%.      PMH:  Past Medical History:   Diagnosis Date    Anemia     -- not a problem since hyst    Colon cancer ([address] dx 2022     plans for chemo--- followed by dr Travis Skelton    COVID-19 2020    no hospitalization    GERD (gastroesophageal reflux disease)     managed with med    History of colonoscopy 2018    Dr. Kim Burr, nl (see media note), R     Hx of blood clots 2022    per pt \"small clot on lung identified by CT scan\"  CT Scan impression:- Nonobstructive pulmonary filling defect involving Left Lower Lobe     Hypotension     asymptomatic    Obstruction of fallopian tube     per pt has \"1 good tube\"    Peritoneal carcinomatosis ([address]     Weight loss     80lbs weight loss after gastric sleeve       PSH:  Past Surgical History:   Procedure Laterality Date     SECTION      CYSTOSCOPY Bilateral 2022    CYSTOSCOPY BILATERAL RETROGRADE PYELOGRAM performed by Lazaro Newberry MD at [address] [place] [address] A Bilateral 2022    BILATERAL CYSTOSCOPY URETERAL STENT EXCHANGE performed by Lazaro Newberry MD at [address]  2014    gastric sleeve- Choudhari    HYSTERECTOMY (CERVIX STATUS UNKNOWN)      2018    HYSTERECTOMY (CERVIX STATUS UNKNOWN)  2020    TLH w/ Bilateral salpingectomy and left oophorectomy    IR PORT PLACEMENT EQUAL OR GREATER THAN 5 YEARS  2022    IR PORT PLACEMENT EQUAL OR GREATER THAN 5 YEARS  2022    IR PORT PLACEMENT EQUAL OR GREATER THAN 5 YEARS 2022 [facility]    LAPAROTOMY N/A 8/17/2022    EXPLORATORY LAPAROTOMY/ ENTEROENTEROSTOMY performed by Lincoln Dahl MD at [facility]. [address] 89  age [address]ue 21s\"    also \"unblocked her FT\"    TONSILLECTOMY      UPPER GASTROINTESTINAL ENDOSCOPY      with dilation    UROLOGICAL SURGERY Bilateral 03/15/2022    cysto       Allergies:  No Known Allergies    Home Medications:  Prior to Admission medications    Medication Sig Start Date End Date Taking? Authorizing Provider   docusate sodium (COLACE, DULCOLAX) 100 MG CAPS Take 100 mg by mouth 2 times daily 8/21/22   Stephanie Martinez APRN - CNP   apixaban (ELIQUIS) 5 MG TABS tablet Take 1 tablet by mouth in the morning and 1 tablet before bedtime.  7/18/22 8/17/22  Francisco Javier Howe MD   potassium chloride (KLOR-CON M) 20 MEQ extended release tablet Take 1 tablet by mouth 2 times daily 6/9/22   SCOTT Mcelroy - CNP   ergocalciferol (ERGOCALCIFEROL) 1.25 MG (96759 UT) capsule Take 50,000 Units by mouth every 7 days Takes on Thursday 10/19/21   Ar Automatic Reconciliation   lidocaine-prilocaine (EMLA) 2.5-2.5 % cream Apply to port about 45 minutes prior to access 3/4/22   Ar Automatic Reconciliation   metoclopramide (REGLAN) 10 MG tablet Take 10 mg by mouth 4 times daily (before meals and nightly) 3/31/22 6/23/22  Ar Automatic Reconciliation   ondansetron (ZOFRAN) 8 MG tablet Take 8 mg by mouth every 8 hours as needed 3/4/22   Ar Automatic Reconciliation   pantoprazole (PROTONIX) 40 MG tablet 2 times daily 3/8/22   Ar Automatic Reconciliation   valACYclovir (VALTREX) 500 MG tablet Take 500 mg by mouth daily 9/1/21   Ar Automatic Reconciliation       Hospital Medications:  Current Facility-Administered Medications   Medication Dose Route Frequency    diatrizoate meglumine-sodium (GASTROGRAFIN) 66-10 % solution 15 mL  15 mL Oral ONCE PRN    pantoprazole (PROTONIX) 40 mg in sodium chloride (PF) 10 mL injection  40 mg IntraVENous Q12H    [Held by provider] apixaban (ELIQUIS) tablet 5 mg  5 mg Oral BID docusate sodium (COLACE) capsule 100 mg  100 mg Oral BID    metoclopramide (REGLAN) tablet 10 mg  10 mg Oral 4x Daily AC & HS    sodium chloride flush 0.9 % injection 5-40 mL  5-40 mL IntraVENous 2 times per day    sodium chloride flush 0.9 % injection 5-40 mL  5-40 mL IntraVENous PRN    0.9 % sodium chloride infusion   IntraVENous PRN    ondansetron (ZOFRAN-ODT) disintegrating tablet 4 mg  4 mg Oral Q8H PRN    Or    ondansetron (ZOFRAN) injection 4 mg  4 mg IntraVENous Q6H PRN    polyethylene glycol (GLYCOLAX) packet 17 g  17 g Oral Daily PRN    acetaminophen (TYLENOL) tablet 650 mg  650 mg Oral Q6H PRN    Or    acetaminophen (TYLENOL) suppository 650 mg  650 mg Rectal Q6H PRN    potassium chloride (KLOR-CON M) extended release tablet 40 mEq  40 mEq Oral PRN    Or    potassium bicarb-citric acid (EFFER-K) effervescent tablet 40 mEq  40 mEq Oral PRN    Or    potassium chloride 10 mEq/100 mL IVPB (Peripheral Line)  10 mEq IntraVENous PRN    magnesium sulfate 2000 mg in 50 mL IVPB premix  2,000 mg IntraVENous PRN    albumin human 25 % IV solution 25 g  25 g IntraVENous Q6H    oxyCODONE (ROXICODONE) immediate release tablet 5 mg  5 mg Oral Q4H PRN    morphine injection 1 mg  1 mg IntraVENous Q4H PRN     Current Outpatient Medications   Medication Sig    docusate sodium (COLACE, DULCOLAX) 100 MG CAPS Take 100 mg by mouth 2 times daily    apixaban (ELIQUIS) 5 MG TABS tablet Take 1 tablet by mouth in the morning and 1 tablet before bedtime.     potassium chloride (KLOR-CON M) 20 MEQ extended release tablet Take 1 tablet by mouth 2 times daily    ergocalciferol (ERGOCALCIFEROL) 1.25 MG (06984 UT) capsule Take 50,000 Units by mouth every 7 days Takes on Thursday    lidocaine-prilocaine (EMLA) 2.5-2.5 % cream Apply to port about 45 minutes prior to access    metoclopramide (REGLAN) 10 MG tablet Take 10 mg by mouth 4 times daily (before meals and nightly)    ondansetron (ZOFRAN) 8 MG tablet Take 8 mg by mouth every 8 hours as needed    pantoprazole (PROTONIX) 40 MG tablet 2 times daily    valACYclovir (VALTREX) 500 MG tablet Take 500 mg by mouth daily       Social History:  Social History     Tobacco Use    Smoking status: Never    Smokeless tobacco: Never   Substance Use Topics    Alcohol use: Not Currently       Pt denies any history of drug use, blood transfusions, or tattoos. Family History:  Family History   Problem Relation Age of Onset    Heart Disease Maternal Grandmother     Hypertension Maternal Grandmother     Heart Disease Maternal Grandfather     Hypertension Maternal Grandfather     Pulmonary Embolism Neg Hx     Colon Cancer Neg Hx     Deep Vein Thrombosis Neg Hx     Ovarian Cancer Neg Hx     Prostate Cancer Neg Hx     Breast Cancer Neg Hx        Review of Systems:  A detailed 10 system ROS is obtained, with pertinent positives as listed above. All others are negative. Diet:  NPO    Objective:     Physical Exam:  Vitals:  BP (!) 125/90   Pulse 77   Temp 98.2 °F (36.8 °C) (Oral)   Resp 17   Ht 5' 4\" (1.626 m)   Wt 112 lb (50.8 kg)   SpO2 100%   BMI 19.22 kg/m²   Gen:  Pt is alert, cooperative, no acute distress, thin  Skin:  Extremities and face reveal no rashes. HEENT: Sclerae anicteric. Extra-occular muscles are intact. No oral ulcers. No abnormal pigmentation of the lips. The neck is supple. Cardiovascular: Regular rate and rhythm. No murmurs, gallops, or rubs. Respiratory:  Comfortable breathing with no accessory muscle use. Clear breath sounds anteriorly with no wheezes, rales, or rhonchi. GI:  Abdomen distended and tight but no significantly tender  Normal active bowel sounds. No enlargement of the liver or spleen. No masses palpable. Rectal:  Deferred  Musculoskeletal:  No pitting edema of the lower legs. Neurological:  Gross memory appears intact. Patient is alert and oriented. Psychiatric:  Mood appears appropriate with judgement intact.   Lymphatic:  No cervical or supraclavicular adenopathy. Laboratory:    Recent Labs     09/12/22  1722 09/12/22  1747 09/13/22  0556   WBC 9.9  --  5.8   HGB 11.8  --  8.9*   HCT 38.7  --  29.8*     --  296   MCV 92.6  --  94.3   NA  --   --  139   K  --   --  2.8*   CL  --   --  115*   CO2  --   --  20*   BUN  --   --  13   CREATININE  --  0.95 0.60   CALCIUM  --   --  6.3*   MG  --   --  1.6*   GLUCOSE  --   --  89   LIPASE 43*  --   --    INR  --   --  1.4     EXAMINATION: CT  ABDOMEN / PELVIS   9/12/2022 8:06 PM       ACCESSION NUMBER:  [id number]       COMPARISON: 06/22/2022       INDICATION:  abdominal pain       TECHNIQUE: Contiguous axial computed tomographic images were obtained from the   domes of the diaphragm to the symphysis pubis following administration 100 mL   Iso-behzad 370. Coronal reconstructions were also performed. Oral contrast was also   administered. Radiation dose reduction techniques were used for this study. Our CT scanners   use one or all of the following: Automated exposure control, adjustment of the   mA and/or kV according to patient size, iterative reconstruction. FINDINGS:       Lung bases: The visualized lung bases are clear. The visualized portions of the   heart are unremarkable. Liver: Normal       Gallbladder: There is gallbladder wall thickening. The gallbladder is not   distended. Spleen: Normal       Adrenals: Normal       Pancreas: Normal       Kidneys: The kidneys enhance symmetrically. There is moderate to severe   bilateral hydronephrosis with ureteral stents in place. Bladder/: The urinary bladder is somewhat thick-walled. There is previous   hysterectomy. .       Bowel: There is large volume ascites. There is some peritoneal enhancement. There is not peritoneal nodularity. There is some nodularity within the omentum   and mesentery. There is previous gastric sleeve type procedure.  The appendix is   normal.       Vessels: Normal       Abdominal wall: Intact       Bones: No suspicious lytic or blastic bony lesions. Impression       Large amount of intraabdominal ascites with mesenteric and peritoneal nodularity   as well as peritoneal enhancement is most concerning for peritoneal   carcinomatosis. Bilateral hydronephrosis with ureteral stents in place. Thick-walled urinary bladder. Correlate with evidence of cystitis. Thick-walled gallbladder is felt likely to be sympathetic. The gallbladder is   not distended. Assessment:     Principal Problem:    Hematemesis  Active Problems:    Bilateral hydronephrosis    Peritoneal carcinomatosis (HCC)    Current use of long term anticoagulation    Carcinoma of unknown primary ([facility]  Resolved Problems:    * No resolved hospital problems. *    52 y.o. female with PMH including but not limited to metastatic carcinoma of unknown primary source dx 2/2022- on chemo (last dose 9/10/22), exlap 8/2022 for debulking of tumor (unsuccessful- tightly adherent) followed by Dr. Olamide Martinez PE on Eliquis  since 6/2022, who is seen in consultation at the request of Dr. Sandro Licona for Hematemesis with anemia (non-iron deficient). She also has ascites ( likely malignant)  which has been bothersome and is requesting a LVP. Plan:     -EGD today since Eliquis has been held >48 hours. - continue PPI q12   - trend H/H and transfuse if needed. - will schedule a LVP tomorrow with studies to include cell count, albumin, cytology, culture. [facility] [place] [place]      Patient is seen and examined in collaboration with Dr. Colin Araujo. Assessment and plan as per Dr. Colin Araujo.

## 2022-09-13 NOTE — ANESTHESIA PRE PROCEDURE
Department of Anesthesiology  Preprocedure Note       Name:  Chirag Gonzalez   Age:  52 y.o.  :  1975                                          MRN:  [id number]         Date:  2022      Surgeon: Lord Jarrett):  Kami Salvador MD    Procedure: Procedure(s):  EGD ESOPHAGOGASTRODUODENOSCOPY ER 15    Medications prior to admission:   Prior to Admission medications    Medication Sig Start Date End Date Taking? Authorizing Provider   apixaban (ELIQUIS) 5 MG TABS tablet Take by mouth 2 times daily    Historical Provider, MD   docusate sodium (COLACE, DULCOLAX) 100 MG CAPS Take 100 mg by mouth 2 times daily  Patient not taking: Reported on 2022   SCOTT Orozco - CNP   apixaban (ELIQUIS) 5 MG TABS tablet Take 1 tablet by mouth in the morning and 1 tablet before bedtime. 22  Christiano Disla MD   potassium chloride (KLOR-CON M) 20 MEQ extended release tablet Take 1 tablet by mouth 2 times daily 22   SCOTT Marcus - CNP   ergocalciferol (ERGOCALCIFEROL) 1.25 MG (22614 UT) capsule Take 50,000 Units by mouth every 7 days Takes on Thursday 10/19/21   Ar Automatic Reconciliation   lidocaine-prilocaine (EMLA) 2.5-2.5 % cream Apply to port about 45 minutes prior to access 3/4/22   Ar Automatic Reconciliation   metoclopramide (REGLAN) 10 MG tablet Take 10 mg by mouth 4 times daily (before meals and nightly) 3/31/22 6/23/22  Ar Automatic Reconciliation   ondansetron (ZOFRAN) 8 MG tablet Take 8 mg by mouth every 8 hours as needed 3/4/22   Ar Automatic Reconciliation   pantoprazole (PROTONIX) 40 MG tablet 2 times daily as needed 3/8/22   Ar Automatic Reconciliation   valACYclovir (VALTREX) 500 MG tablet Take 500 mg by mouth daily 21   Ar Automatic Reconciliation       Current medications:    No current facility-administered medications for this visit. No current outpatient medications on file.      Facility-Administered Medications Ordered in Other Visits Medication Dose Route Frequency Provider Last Rate Last Admin    magnesium sulfate 2000 mg in 50 mL IVPB premix  2,000 mg IntraVENous Once Nelia MD Mitch        diatrizoate meglumine-sodium (GASTROGRAFIN) 66-10 % solution 15 mL  15 mL Oral ONCE PRN Thu Lopez, DO   15 mL at 09/12/22 1807    pantoprazole (PROTONIX) 40 mg in sodium chloride (PF) 10 mL injection  40 mg IntraVENous Q12H Thu Lopez, DO   40 mg at 09/13/22 0600    [Held by provider] apixaban (ELIQUIS) tablet 5 mg  5 mg Oral BID Thu Lopez, DO        docusate sodium (COLACE) capsule 100 mg  100 mg Oral BID Thu Lopez, DO   100 mg at 09/13/22 0831    metoclopramide (REGLAN) tablet 10 mg  10 mg Oral 4x Daily AC & HS Thu Lopez, DO   10 mg at 09/13/22 0831    sodium chloride flush 0.9 % injection 5-40 mL  5-40 mL IntraVENous 2 times per day Thu Lopez, DO        sodium chloride flush 0.9 % injection 5-40 mL  5-40 mL IntraVENous PRN Thu Lopez, DO        0.9 % sodium chloride infusion   IntraVENous PRN Thu Lopez, DO 25 mL/hr at 09/13/22 1224 New Bag at 09/13/22 1224    ondansetron (ZOFRAN-ODT) disintegrating tablet 4 mg  4 mg Oral Q8H PRN Thu Lopez, DO        Or    ondansetron (ZOFRAN) injection 4 mg  4 mg IntraVENous Q6H PRN Thu Lopez, DO   4 mg at 09/13/22 1302    polyethylene glycol (GLYCOLAX) packet 17 g  17 g Oral Daily PRN Thu Lopez, DO        acetaminophen (TYLENOL) tablet 650 mg  650 mg Oral Q6H PRN Thu Lopez, DO        Or    acetaminophen (TYLENOL) suppository 650 mg  650 mg Rectal Q6H PRN Thu Lopez, DO        potassium chloride (KLOR-CON M) extended release tablet 40 mEq  40 mEq Oral PRN Thu Lopez, DO        Or    potassium bicarb-citric acid (EFFER-K) effervescent tablet 40 mEq  40 mEq Oral PRN Thu Lopez, DO        Or    potassium chloride 10 mEq/100 mL IVPB (Peripheral Line)  10 mEq IntraVENous PRN Thu Lopez, DO        magnesium sulfate 2000 mg in 50 mL IVPB premix  2,000 mg IntraVENous PRN Thu Lopez, DO        albumin human 25 % IV solution 25 g  25 g IntraVENous Q6H Thu Lopez, DO   Stopped at 22 5809    oxyCODONE (ROXICODONE) immediate release tablet 5 mg  5 mg Oral Q4H PRN Thu Lopez, DO   5 mg at 22 0831    morphine injection 1 mg  1 mg IntraVENous Q4H PRN Thu Lopez, DO   1 mg at 22 1302       Allergies:  No Known Allergies    Problem List:    Patient Active Problem List   Diagnosis Code    Fibroids D21.9    Paraesophageal hernia K44.9    Menorrhagia with regular cycle N92.0    Papanicolaou smear of cervix with low grade squamous intraepithelial lesion (LGSIL) R87.612    Iron deficiency anemia due to chronic blood loss D50.0    Anemia D64.9    History of weight loss surgery Z98.84    Carcinoma of unknown primary ([address] C80.1    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 (HCC) C78.6, C80.1    Bilateral hydronephrosis N13.30    Peritoneal carcinoma (HCC) C48.2    Peritoneal carcinomatosis ([address] C78.6    Hematemesis K92.0    Current use of long term anticoagulation Z79.01       Past Medical History:        Diagnosis Date    Anemia     -- not a problem since hyst    Colon cancer ([address] dx 2022     plans for chemo--- followed by dr Liane Goldmann COVID-19 2020    no hospitalization    GERD (gastroesophageal reflux disease)     managed with med    History of colonoscopy 2018    Dr. Charlie De Los Santos, nl (see media note), R     Hx of blood clots 2022    per pt \"small clot on lung identified by CT scan\"  CT Scan impression:- Nonobstructive pulmonary filling defect involving Left Lower Lobe     Hypotension     asymptomatic    Obstruction of fallopian tube     per pt has \"1 good tube\"    Peritoneal carcinomatosis ([address]     Weight loss     80lbs weight loss after gastric sleeve       Past Surgical History:        Procedure Laterality Date     SECTION      CYSTOSCOPY Bilateral 2022    CYSTOSCOPY BILATERAL RETROGRADE PYELOGRAM performed by Brittnee Rodriges MD [facility]    CYSTOSCOPY Bilateral 2022 BILATERAL CYSTOSCOPY URETERAL STENT EXCHANGE performed by Ludivina Calhoun MD at [address]  07/23/2014    gastric sleeve- Choudhari    HYSTERECTOMY (CERVIX STATUS UNKNOWN)      2018    HYSTERECTOMY (CERVIX STATUS UNKNOWN)  07/09/2020    TLH w/ Bilateral salpingectomy and left oophorectomy    IR PORT PLACEMENT EQUAL OR GREATER THAN 5 YEARS  2/28/2022    IR PORT PLACEMENT EQUAL OR GREATER THAN 5 YEARS  2/28/2022    IR PORT PLACEMENT EQUAL OR GREATER THAN 5 YEARS 2/28/2022 [facility]    LAPAROTOMY N/A 8/17/2022    EXPLORATORY LAPAROTOMY/ ENTEROENTEROSTOMY performed by Mariel Jordan MD at [facility] [address] 56  age Racheal Brady 21s\"    also \"unblocked her FT\"    TONSILLECTOMY      UPPER GASTROINTESTINAL ENDOSCOPY      with dilation    UROLOGICAL SURGERY Bilateral 03/15/2022    cysto       Social History:    Social History     Tobacco Use    Smoking status: Never    Smokeless tobacco: Never   Substance Use Topics    Alcohol use: Not Currently                                Counseling given: Not Answered      Vital Signs (Current): There were no vitals filed for this visit.                                            BP Readings from Last 3 Encounters:   09/13/22 (!) 135/95   09/10/22 (!) 138/94   09/08/22 (!) 142/96       NPO Status:                                                                                 BMI:   Wt Readings from Last 3 Encounters:   09/12/22 112 lb (50.8 kg)   09/08/22 113 lb 12.8 oz (51.6 kg)   09/01/22 115 lb (52.2 kg)     There is no height or weight on file to calculate BMI.    CBC:   Lab Results   Component Value Date/Time    WBC 5.8 09/13/2022 05:56 AM    RBC 3.16 09/13/2022 05:56 AM    HGB 8.9 09/13/2022 05:56 AM    HCT 29.8 09/13/2022 05:56 AM    MCV 94.3 09/13/2022 05:56 AM    RDW 13.5 09/13/2022 05:56 AM     09/13/2022 05:56 AM       CMP:   Lab Results   Component Value Date/Time     09/13/2022 05:56 AM    K 2.8 09/13/2022 05:56 AM     09/13/2022 05:56 AM    CO2 20 09/13/2022 05:56 AM    BUN 13 09/13/2022 05:56 AM    CREATININE 0.60 09/13/2022 05:56 AM    GFRAA >60 09/13/2022 05:56 AM    AGRATIO 1.1 05/12/2022 12:43 PM    LABGLOM >60 09/13/2022 05:56 AM    GLUCOSE 89 09/13/2022 05:56 AM    PROT 7.3 09/08/2022 02:54 PM    CALCIUM 6.3 09/13/2022 05:56 AM    BILITOT 0.4 09/08/2022 02:54 PM    ALKPHOS 66 09/08/2022 02:54 PM    ALKPHOS 77 05/12/2022 12:43 PM    AST 16 09/08/2022 02:54 PM    ALT 12 09/08/2022 02:54 PM       POC Tests:   Recent Labs     09/12/22  1747   POCGLU 131*   POCNA 138   POCK 3.8   POCCL 100   POCBUN 13       Coags:   Lab Results   Component Value Date/Time    PROTIME 17.5 09/13/2022 05:56 AM    INR 1.4 09/13/2022 05:56 AM    APTT 23.0 08/10/2022 10:39 AM       HCG (If Applicable): No results found for: PREGTESTUR, PREGSERUM, HCG, HCGQUANT     ABGs: No results found for: PHART, PO2ART, CTT0RXC, DFV7SVB, BEART, [id number]     Type & Screen (If Applicable):  No results found for: LABABO, LABRH    Drug/Infectious Status (If Applicable):  No results found for: HIV, HEPCAB    COVID-19 Screening (If Applicable):   Lab Results   Component Value Date/Time    COVID19 Not Detected 02/11/2022 07:14 AM    COVID19 Performed 02/11/2022 07:14 AM           Anesthesia Evaluation  Patient summary reviewed and Nursing notes reviewed no history of anesthetic complications:   Airway: Mallampati: II  TM distance: >3 FB   Neck ROM: full  Mouth opening: > = 3 FB   Dental:      Comment: Fillings    Pulmonary:normal exam                               Cardiovascular:                   ROS comment: Hx of PE on eliquis    PE comment: Tachycardic   Neuro/Psych:   Negative Neuro/Psych ROS              GI/Hepatic/Renal:   (+) GERD:,          ROS comment: Hematemsis    Bilateral hydronephrosis with ureteral stents.    Endo/Other:    (+) blood dyscrasia: anemia:., electrolyte abnormalities (hypokalemia), malignancy/cancer (colon cancer with peritoneal carcinomatosis and ascites ). Abdominal:             Vascular:   + PE. Other Findings:             Anesthesia Plan      TIVA     ASA 3     (Last dose Eliquis last week sometime per patient)  Induction: intravenous. Anesthetic plan and risks discussed with patient.                         Nina Ray MD   9/13/2022

## 2022-09-14 LAB
ALBUMIN FLD-MCNC: 2.5 G/DL
ANION GAP SERPL CALC-SCNC: 7 MMOL/L (ref 4–13)
APPEARANCE FLD: NORMAL
BASOPHILS # BLD: 0 K/UL (ref 0–0.2)
BASOPHILS NFR BLD: 0 % (ref 0–2)
BUN SERPL-MCNC: 14 MG/DL (ref 6–23)
CALCIUM SERPL-MCNC: 9.6 MG/DL (ref 8.3–10.4)
CHLORIDE SERPL-SCNC: 103 MMOL/L (ref 101–110)
CO2 SERPL-SCNC: 26 MMOL/L (ref 21–32)
COLOR FLD: YELLOW
CREAT SERPL-MCNC: 0.7 MG/DL (ref 0.6–1)
DIFFERENTIAL METHOD BLD: ABNORMAL
EOSINOPHIL # BLD: 0 K/UL (ref 0–0.8)
EOSINOPHIL NFR BLD: 0 % (ref 0.5–7.8)
ERYTHROCYTE [DISTWIDTH] IN BLOOD BY AUTOMATED COUNT: 13.9 % (ref 11.9–14.6)
GLUCOSE SERPL-MCNC: 137 MG/DL (ref 65–100)
HCT VFR BLD AUTO: 32.8 % (ref 35.8–46.3)
HGB BLD-MCNC: 9.2 G/DL (ref 11.7–15.4)
HGB BLD-MCNC: 9.8 G/DL (ref 11.7–15.4)
IMM GRANULOCYTES # BLD AUTO: 0 K/UL (ref 0–0.5)
IMM GRANULOCYTES NFR BLD AUTO: 1 % (ref 0–5)
LYMPHOCYTES # BLD: 0.4 K/UL (ref 0.5–4.6)
LYMPHOCYTES NFR BLD: 8 % (ref 13–44)
LYMPHOCYTES NFR BRONCH MANUAL: 23 %
MACROPHAGES NFR BRONCH MANUAL: 2 %
MCH RBC QN AUTO: 28.1 PG (ref 26.1–32.9)
MCHC RBC AUTO-ENTMCNC: 29.9 G/DL (ref 31.4–35)
MCV RBC AUTO: 94 FL (ref 79.6–97.8)
MONOCYTES # BLD: 0.2 K/UL (ref 0.1–1.3)
MONOCYTES NFR BLD: 4 % (ref 4–12)
NEUTROPHILS NFR BRONCH MANUAL: 75 %
NEUTS SEG # BLD: 4.1 K/UL (ref 1.7–8.2)
NEUTS SEG NFR BLD: 87 % (ref 43–78)
NRBC # BLD: 0 K/UL (ref 0–0.2)
NUC CELL # FLD: 1105 /CU MM
PLATELET # BLD AUTO: 261 K/UL (ref 150–450)
PMV BLD AUTO: 10.3 FL (ref 9.4–12.3)
POTASSIUM SERPL-SCNC: 4.2 MMOL/L (ref 3.5–5.1)
PROT FLD-MCNC: 5 G/DL
RBC # BLD AUTO: 3.49 M/UL (ref 4.05–5.2)
RBC # FLD: NORMAL /CU MM
SODIUM SERPL-SCNC: 136 MMOL/L (ref 136–145)
SPECIMEN SOURCE FLD: NORMAL
WBC # BLD AUTO: 4.7 K/UL (ref 4.3–11.1)

## 2022-09-14 PROCEDURE — 6370000000 HC RX 637 (ALT 250 FOR IP): Performed by: FAMILY MEDICINE

## 2022-09-14 PROCEDURE — 2580000003 HC RX 258: Performed by: FAMILY MEDICINE

## 2022-09-14 PROCEDURE — 99233 SBSQ HOSP IP/OBS HIGH 50: CPT | Performed by: INTERNAL MEDICINE

## 2022-09-14 PROCEDURE — 6360000002 HC RX W HCPCS: Performed by: FAMILY MEDICINE

## 2022-09-14 PROCEDURE — 94760 N-INVAS EAR/PLS OXIMETRY 1: CPT

## 2022-09-14 PROCEDURE — 80048 BASIC METABOLIC PNL TOTAL CA: CPT

## 2022-09-14 PROCEDURE — APPSS30 APP SPLIT SHARED TIME 16-30 MINUTES: Performed by: NURSE PRACTITIONER

## 2022-09-14 PROCEDURE — 36415 COLL VENOUS BLD VENIPUNCTURE: CPT

## 2022-09-14 PROCEDURE — 6370000000 HC RX 637 (ALT 250 FOR IP): Performed by: INTERNAL MEDICINE

## 2022-09-14 PROCEDURE — 85025 COMPLETE CBC W/AUTO DIFF WBC: CPT

## 2022-09-14 PROCEDURE — 85018 HEMOGLOBIN: CPT

## 2022-09-14 PROCEDURE — 1100000000 HC RM PRIVATE

## 2022-09-14 RX ADMIN — DOCUSATE SODIUM 100 MG: 100 CAPSULE, LIQUID FILLED ORAL at 20:47

## 2022-09-14 RX ADMIN — SODIUM CHLORIDE, PRESERVATIVE FREE 10 ML: 5 INJECTION INTRAVENOUS at 08:02

## 2022-09-14 RX ADMIN — OXYCODONE 5 MG: 5 TABLET ORAL at 18:43

## 2022-09-14 RX ADMIN — PANTOPRAZOLE SODIUM 40 MG: 40 TABLET, DELAYED RELEASE ORAL at 06:49

## 2022-09-14 RX ADMIN — METOCLOPRAMIDE HYDROCHLORIDE 10 MG: 10 TABLET ORAL at 17:35

## 2022-09-14 RX ADMIN — ONDANSETRON 4 MG: 2 INJECTION INTRAMUSCULAR; INTRAVENOUS at 14:21

## 2022-09-14 RX ADMIN — METOCLOPRAMIDE HYDROCHLORIDE 10 MG: 10 TABLET ORAL at 06:49

## 2022-09-14 RX ADMIN — OXYCODONE 5 MG: 5 TABLET ORAL at 08:01

## 2022-09-14 RX ADMIN — MORPHINE SULFATE 1 MG: 2 INJECTION, SOLUTION INTRAMUSCULAR; INTRAVENOUS at 14:21

## 2022-09-14 RX ADMIN — DOCUSATE SODIUM 100 MG: 100 CAPSULE, LIQUID FILLED ORAL at 08:02

## 2022-09-14 RX ADMIN — METOCLOPRAMIDE HYDROCHLORIDE 10 MG: 10 TABLET ORAL at 11:06

## 2022-09-14 RX ADMIN — SODIUM CHLORIDE, PRESERVATIVE FREE 10 ML: 5 INJECTION INTRAVENOUS at 20:51

## 2022-09-14 RX ADMIN — MORPHINE SULFATE 1 MG: 2 INJECTION, SOLUTION INTRAMUSCULAR; INTRAVENOUS at 23:16

## 2022-09-14 RX ADMIN — METOCLOPRAMIDE HYDROCHLORIDE 10 MG: 10 TABLET ORAL at 20:47

## 2022-09-14 RX ADMIN — PANTOPRAZOLE SODIUM 40 MG: 40 TABLET, DELAYED RELEASE ORAL at 17:36

## 2022-09-14 RX ADMIN — APIXABAN 5 MG: 5 TABLET, FILM COATED ORAL at 20:47

## 2022-09-14 ASSESSMENT — PAIN DESCRIPTION - DESCRIPTORS: DESCRIPTORS: ACHING;DISCOMFORT

## 2022-09-14 ASSESSMENT — PAIN DESCRIPTION - LOCATION
LOCATION: ABDOMEN
LOCATION: ABDOMEN;BACK
LOCATION: ABDOMEN

## 2022-09-14 ASSESSMENT — PAIN SCALES - GENERAL
PAINLEVEL_OUTOF10: 4
PAINLEVEL_OUTOF10: 0
PAINLEVEL_OUTOF10: 6
PAINLEVEL_OUTOF10: 7
PAINLEVEL_OUTOF10: 7
PAINLEVEL_OUTOF10: 4
PAINLEVEL_OUTOF10: 0

## 2022-09-14 ASSESSMENT — PAIN DESCRIPTION - ORIENTATION: ORIENTATION: ANTERIOR

## 2022-09-14 NOTE — BRIEF OP NOTE
Department of Interventional Radiology  [phone]        Interventional Radiology Brief Procedure Note    Patient: Jusitn Gauthier MRN: [id number]  SSN: [id number]    YOB: 1975  Age: 52 y.o.   Sex: female      Date of Procedure: 9/14/2022    Pre-Procedure Diagnosis: ascites    Post-Procedure Diagnosis: SAME    Procedure(s): Paracentesis    Brief Description of Procedure: 3800 ml thin yellow fluid rmoved    Performed By: Yuan Sumner PA-C     Assistants: None    Anesthesia:Lidocaine    Estimated Blood Loss: None    Specimens:  Cytology    Implants:  None    Findings: large volume ascites     Complications: None    Recommendations: routine wound care     Follow Up: prn    Signed By: Yuan Sumner PA-C     September 14, 2022

## 2022-09-14 NOTE — PROGRESS NOTES
[facility] Hematology & Oncology        Inpatient Hematology / Oncology Progress Note      Admission Date: 2022  5:06 PM  Reason for Admission/Hospital Course: Hematemesis [K92.0]  Generalized abdominal pain [R10.84]  Intractable vomiting with nausea, unspecified vomiting type [R11.2]      24 Hour Events:  EGD neg for bleed  Para removed 3800  Hgb stable  Feels weak      ROS:  Constitutional: negative for fever, chills. +weakness, malaise fatigue. CV: negative for chest pain, palpitations, edema. Respiratory: negative for dyspnea, cough, wheezing. GI: negative for nausea, abdominal pain, diarrhea. 10 point review of systems is otherwise negative with the exception of the elements mentioned above in the HPI. No Known Allergies    OBJECTIVE:  Patient Vitals for the past 8 hrs:   BP Temp Temp src Pulse Resp SpO2 Weight   22 0802 (!) 136/91 97.4 °F (36.3 °C) Oral 92 16 98 % --   22 0518 -- -- -- -- -- -- 112 lb (50.8 kg)   22 0302 (!) 127/93 97.7 °F (36.5 °C) Oral 96 16 99 % --     Temp (24hrs), Av.5 °F (36.9 °C), Min:97.4 °F (36.3 °C), Max:99.1 °F (37.3 °C)    No intake/output data recorded. Physical Exam:  Constitutional: Well developed, thin appearing female in no acute distress, sitting comfortably in the hospital bed. HEENT: Normocephalic and atraumatic. Oropharynx is clear, mucous membranes are moist.  Pupils are equal, round, and reactive to light. Extraocular muscles are intact. Sclerae anicteric. Skin Warm and dry. No bruising and no rash noted. No erythema. No pallor. Respiratory Lungs are clear to auscultation bilaterally without wheezes, rales or rhonchi, normal air exchange without accessory muscle use. CVS Normal rate, regular rhythm and normal S1 and S2. No murmurs, gallops, or rubs. Abdomen Soft, mildly tender and distended, normoactive bowel sounds. No palpable mass. No hepatosplenomegaly.    Neuro Grossly nonfocal with no obvious sensory or motor deficits. MSK Normal range of motion in general.  No edema and no tenderness. Psych Appropriate mood and affect.         Labs:      Recent Labs     09/12/22  1722 09/13/22  0556 09/14/22  0011   WBC 9.9 5.8  --    RBC 4.18 3.16*  --    HGB 11.8 8.9* 9.2*   HCT 38.7 29.8*  --    MCV 92.6 94.3  --    MCH 28.2 28.2  --    MCHC 30.5* 29.9*  --    RDW 13.5 13.5  --     296  --    MPV 10.2 10.4  --         Recent Labs     09/13/22  0556      K 2.8*   *   CO2 20*   BUN 13   GFRAA >60   MG 1.6*         Imaging:    Medications:  Current Facility-Administered Medications   Medication Dose Route Frequency    pantoprazole (PROTONIX) tablet 40 mg  40 mg Oral BID AC    diatrizoate meglumine-sodium (GASTROGRAFIN) 66-10 % solution 15 mL  15 mL Oral ONCE PRN    [Held by provider] apixaban (ELIQUIS) tablet 5 mg  5 mg Oral BID    docusate sodium (COLACE) capsule 100 mg  100 mg Oral BID    metoclopramide (REGLAN) tablet 10 mg  10 mg Oral 4x Daily AC & HS    sodium chloride flush 0.9 % injection 5-40 mL  5-40 mL IntraVENous 2 times per day    sodium chloride flush 0.9 % injection 5-40 mL  5-40 mL IntraVENous PRN    0.9 % sodium chloride infusion   IntraVENous PRN    ondansetron (ZOFRAN-ODT) disintegrating tablet 4 mg  4 mg Oral Q8H PRN    Or    ondansetron (ZOFRAN) injection 4 mg  4 mg IntraVENous Q6H PRN    polyethylene glycol (GLYCOLAX) packet 17 g  17 g Oral Daily PRN    acetaminophen (TYLENOL) tablet 650 mg  650 mg Oral Q6H PRN    Or    acetaminophen (TYLENOL) suppository 650 mg  650 mg Rectal Q6H PRN    potassium chloride (KLOR-CON M) extended release tablet 40 mEq  40 mEq Oral PRN    Or    potassium bicarb-citric acid (EFFER-K) effervescent tablet 40 mEq  40 mEq Oral PRN    Or    potassium chloride 10 mEq/100 mL IVPB (Peripheral Line)  10 mEq IntraVENous PRN    magnesium sulfate 2000 mg in 50 mL IVPB premix  2,000 mg IntraVENous PRN    oxyCODONE (ROXICODONE) immediate release tablet 5 mg  5 mg Oral Q4H PRN    morphine injection 1 mg  1 mg IntraVENous Q4H PRN     Ms. Miranda Diez is a 52 y.o. female with medical history of PE on Eliquis and metastatic carcinoma of unknown primary followed by Dr. Karen Mendiola currently sp cycle 9 FOLFOX on 9/8/2022. She had ex-lap on 8/2022 to eval for debulking but tumor was found to be tightly adherent and impossible to debulk. She presented to ED with 1 day duration of hematemesis. Pt reported she has been feeling abdominal distention that has progressively worsened over 1 week duration associated with mild abdominal discomfort. She stated that yesterday she vomited up what looked like bloody substance however she was unsure as she has drank red Gatorade earlier in the day. Per notes she had another episode of emesis in ED however at that time was nonbloody. CT AP shows large amount of intra-abdominal ascites with mesenteric/peritoneal nodularity concerning for peritoneal carcinomatosis; bilateral hydronephrosis with ureteral stents in place; thick-walled urinary bladder; thick-walled gallbladder felt to be sympathetic and not distended. IR is consulted for paracentesis with studies ordered. Eliquis is on hold and GI is consulted. PLAN:  CUP now with large volume ascites  -sp cycle 9 FOLFOX on 9/8/2022.  - CT AP shows large amount of intra-abdominal ascites with mesenteric/peritoneal nodularity concerning for peritoneal carcinomatosis; bilateral hydronephrosis with ureteral stents in place; thick-walled urinary bladder; thick-walled gallbladder felt to be sympathetic and not distended.  -Para with studies tomorrow-EGD today  9/14 EGD negative for bleed. Para -3800 studies ordered. Advance diet. History of PE  -Eliquis on hold due to hematemesis  9/14 resume Eliquis     Hematemesis  -PPI BID  -GI planning EGD today  -Hgb 8.9 (11.8)  9/14 hgb stable 9.2    Goals and plan of care reviewed with the patient. All questions answered to the best of our ability.             Emerald See Kip Duck [facility] Hematology & Oncology  [address] [place] [address]  Office : [phone]  Fax : [phone]          Attending Addendum:  I have personally performed a face to face diagnostic evaluation on this patient. I have reviewed and agree with the care plan as documented by Shawnee Palm NNaseemP. 36 minutes were spent on patient care, including but not limited to, reviewing the chart and time with the patient and family, more than 50% of the time documented was spent in face-to-face contact with the patient and in the care of the patient on the floor/unit where the patient is located. My findings are as follows: She has CUPS, appears weak, heart rate regular without murmurs, abdomen is non-tender, bowel sounds are positive, we will arrange for her to under paracentesis.               Mateo Chakraborty MD    [facility] Hematology/Oncology  [address] [place] [address]  Office : [phone]  Fax : [phone]

## 2022-09-14 NOTE — CARE COORDINATION
CM reviewed patient's chart. Per chart review, patient insured - no PCP listed. No PT/OT consults placed/needed at this time. CM anticipates that DCP will be that patient will return home with no needs. Please consult CM if any needs should arise. 09/13/22 9127   Service Assessment   Support Systems Family Members   Patient's [address] is: Legal Next of Kin   Discharge Planning   Type of Residence House   Living Arrangements Family Members   Current Services Prior To Admission None   Potential Assistance Needed N/A   DME Ordered?  No   Potential Assistance Purchasing Medications No   Type of Home Care Services None   Patient expects to be discharged to: [facility]

## 2022-09-14 NOTE — PROGRESS NOTES
.. TRANSFER - OUT REPORT:    Verbal report given to [address] on [facility] [place]  being transferred to  5th floor for routine progression of patient care       Report consisted of patient's Situation, Background, Assessment and   Recommendations(SBAR). Information from the following report(s) Nurse Handoff Report was reviewed with the receiving nurse. Lines:   Single Lumen Implantable Port Right Subclavian (Active)   Port A Cath Status Not Accessed 09/13/22 2048   Criteria for Appropriate Use Irritant/vesicant medication 09/13/22 2048   Site Assessment Clean, dry & intact 09/13/22 2048       Peripheral IV 09/12/22 Right Antecubital (Active)   Site Assessment Clean, dry & intact 09/14/22 1151   Line Status Normal saline locked; Capped 09/14/22 1151   Line Care Connections checked and tightened 09/13/22 2243   Phlebitis Assessment No symptoms 09/13/22 2243   Infiltration Assessment 0 09/13/22 2243   Alcohol Cap Used Yes 09/13/22 2243   Dressing Status Clean, dry & intact 09/14/22 1151   Dressing Type Transparent 09/13/22 2243        Opportunity for questions and clarification was provided. Patient transport with transport.

## 2022-09-14 NOTE — PROGRESS NOTES
.. TRANSFER - OUT REPORT:    Verbal report given to  Lisa Johns on [employer]  being transferred to  IR for ordered procedure       Report consisted of patient's Situation, Background, Assessment and   Recommendations(SBAR). Information from the following report(s) Nurse Handoff Report was reviewed with the receiving nurse. Lines:   Single Lumen Implantable Port Right Subclavian (Active)   Port A Cath Status Not Accessed 09/13/22 2048   Criteria for Appropriate Use Irritant/vesicant medication 09/13/22 2048   Site Assessment Clean, dry & intact 09/13/22 2048       Peripheral IV 09/12/22 Right Antecubital (Active)   Site Assessment Clean, dry & intact 09/14/22 0730   Line Status Normal saline locked; Capped 09/14/22 0730   Line Care Connections checked and tightened 09/13/22 2243   Phlebitis Assessment No symptoms 09/13/22 2243   Infiltration Assessment 0 09/13/22 2243   Alcohol Cap Used Yes 09/13/22 2243   Dressing Status Clean, dry & intact 09/14/22 0730   Dressing Type Transparent 09/13/22 2243        Opportunity for questions and clarification was provided.       Patient transported with hospital transport

## 2022-09-15 LAB
ALBUMIN SERPL-MCNC: 2.9 G/DL (ref 3.5–5)
ALBUMIN/GLOB SERPL: 0.8 {RATIO} (ref 1.2–3.5)
ALP SERPL-CCNC: 55 U/L (ref 50–136)
ALT SERPL-CCNC: 7 U/L (ref 12–65)
ANION GAP SERPL CALC-SCNC: 7 MMOL/L (ref 4–13)
AST SERPL-CCNC: 6 U/L (ref 15–37)
BASOPHILS # BLD: 0 K/UL (ref 0–0.2)
BASOPHILS NFR BLD: 0 % (ref 0–2)
BILIRUB SERPL-MCNC: 0.4 MG/DL (ref 0.2–1.1)
BUN SERPL-MCNC: 21 MG/DL (ref 6–23)
CALCIUM SERPL-MCNC: 9 MG/DL (ref 8.3–10.4)
CHLORIDE SERPL-SCNC: 102 MMOL/L (ref 101–110)
CO2 SERPL-SCNC: 27 MMOL/L (ref 21–32)
CREAT SERPL-MCNC: 0.8 MG/DL (ref 0.6–1)
CYTOLOGY-NON GYN: NORMAL
DIFFERENTIAL METHOD BLD: ABNORMAL
EOSINOPHIL # BLD: 0 K/UL (ref 0–0.8)
EOSINOPHIL NFR BLD: 0 % (ref 0.5–7.8)
ERYTHROCYTE [DISTWIDTH] IN BLOOD BY AUTOMATED COUNT: 14.1 % (ref 11.9–14.6)
GLOBULIN SER CALC-MCNC: 3.6 G/DL (ref 2.3–3.5)
GLUCOSE SERPL-MCNC: 125 MG/DL (ref 65–100)
HCT VFR BLD AUTO: 30.4 % (ref 35.8–46.3)
HGB BLD-MCNC: 9.3 G/DL (ref 11.7–15.4)
IMM GRANULOCYTES # BLD AUTO: 0 K/UL (ref 0–0.5)
IMM GRANULOCYTES NFR BLD AUTO: 0 % (ref 0–5)
LYMPHOCYTES # BLD: 0.6 K/UL (ref 0.5–4.6)
LYMPHOCYTES NFR BLD: 13 % (ref 13–44)
MCH RBC QN AUTO: 28.2 PG (ref 26.1–32.9)
MCHC RBC AUTO-ENTMCNC: 30.6 G/DL (ref 31.4–35)
MCV RBC AUTO: 92.1 FL (ref 79.6–97.8)
MONOCYTES # BLD: 0.4 K/UL (ref 0.1–1.3)
MONOCYTES NFR BLD: 8 % (ref 4–12)
NEUTS SEG # BLD: 3.7 K/UL (ref 1.7–8.2)
NEUTS SEG NFR BLD: 79 % (ref 43–78)
NRBC # BLD: 0 K/UL (ref 0–0.2)
PLATELET # BLD AUTO: 260 K/UL (ref 150–450)
PMV BLD AUTO: 10.5 FL (ref 9.4–12.3)
POTASSIUM SERPL-SCNC: 4.2 MMOL/L (ref 3.5–5.1)
PROT SERPL-MCNC: 6.5 G/DL (ref 6.3–8.2)
RBC # BLD AUTO: 3.3 M/UL (ref 4.05–5.2)
SODIUM SERPL-SCNC: 136 MMOL/L (ref 136–145)
SPECIMEN SOURCE: NORMAL
WBC # BLD AUTO: 4.8 K/UL (ref 4.3–11.1)

## 2022-09-15 PROCEDURE — 2580000003 HC RX 258: Performed by: FAMILY MEDICINE

## 2022-09-15 PROCEDURE — 6360000002 HC RX W HCPCS: Performed by: NURSE PRACTITIONER

## 2022-09-15 PROCEDURE — 6370000000 HC RX 637 (ALT 250 FOR IP): Performed by: NURSE PRACTITIONER

## 2022-09-15 PROCEDURE — 6370000000 HC RX 637 (ALT 250 FOR IP): Performed by: INTERNAL MEDICINE

## 2022-09-15 PROCEDURE — 1100000000 HC RM PRIVATE

## 2022-09-15 PROCEDURE — 6370000000 HC RX 637 (ALT 250 FOR IP): Performed by: FAMILY MEDICINE

## 2022-09-15 PROCEDURE — 99233 SBSQ HOSP IP/OBS HIGH 50: CPT | Performed by: INTERNAL MEDICINE

## 2022-09-15 PROCEDURE — 85025 COMPLETE CBC W/AUTO DIFF WBC: CPT

## 2022-09-15 PROCEDURE — 94760 N-INVAS EAR/PLS OXIMETRY 1: CPT

## 2022-09-15 PROCEDURE — 6360000002 HC RX W HCPCS: Performed by: FAMILY MEDICINE

## 2022-09-15 PROCEDURE — 80053 COMPREHEN METABOLIC PANEL: CPT

## 2022-09-15 RX ORDER — PANTOPRAZOLE SODIUM 40 MG/1
40 TABLET, DELAYED RELEASE ORAL DAILY
Qty: 30 TABLET | Refills: 3 | Status: SHIPPED | OUTPATIENT
Start: 2022-09-15 | End: 2022-09-16 | Stop reason: SDUPTHER

## 2022-09-15 RX ORDER — LORAZEPAM 2 MG/ML
0.5 INJECTION INTRAMUSCULAR EVERY 6 HOURS PRN
Status: DISCONTINUED | OUTPATIENT
Start: 2022-09-15 | End: 2022-09-17

## 2022-09-15 RX ORDER — LORAZEPAM 2 MG/ML
1 INJECTION INTRAMUSCULAR EVERY 6 HOURS PRN
Status: DISCONTINUED | OUTPATIENT
Start: 2022-09-15 | End: 2022-09-15

## 2022-09-15 RX ORDER — HYDROCODONE BITARTRATE AND ACETAMINOPHEN 5; 325 MG/1; MG/1
1 TABLET ORAL EVERY 4 HOURS PRN
Status: DISCONTINUED | OUTPATIENT
Start: 2022-09-15 | End: 2022-09-26

## 2022-09-15 RX ADMIN — OXYCODONE 5 MG: 5 TABLET ORAL at 10:20

## 2022-09-15 RX ADMIN — DOCUSATE SODIUM 100 MG: 100 CAPSULE, LIQUID FILLED ORAL at 08:15

## 2022-09-15 RX ADMIN — ONDANSETRON 4 MG: 2 INJECTION INTRAMUSCULAR; INTRAVENOUS at 21:18

## 2022-09-15 RX ADMIN — ONDANSETRON 4 MG: 2 INJECTION INTRAMUSCULAR; INTRAVENOUS at 14:40

## 2022-09-15 RX ADMIN — DOCUSATE SODIUM 100 MG: 100 CAPSULE, LIQUID FILLED ORAL at 21:05

## 2022-09-15 RX ADMIN — HYDROCODONE BITARTRATE AND ACETAMINOPHEN 1 TABLET: 5; 325 TABLET ORAL at 14:22

## 2022-09-15 RX ADMIN — ONDANSETRON 4 MG: 2 INJECTION INTRAMUSCULAR; INTRAVENOUS at 06:49

## 2022-09-15 RX ADMIN — LORAZEPAM 1 MG: 2 INJECTION INTRAMUSCULAR; INTRAVENOUS at 10:38

## 2022-09-15 RX ADMIN — SODIUM CHLORIDE, PRESERVATIVE FREE 10 ML: 5 INJECTION INTRAVENOUS at 08:16

## 2022-09-15 RX ADMIN — MORPHINE SULFATE 1 MG: 2 INJECTION, SOLUTION INTRAMUSCULAR; INTRAVENOUS at 06:40

## 2022-09-15 RX ADMIN — METOCLOPRAMIDE HYDROCHLORIDE 10 MG: 10 TABLET ORAL at 10:38

## 2022-09-15 RX ADMIN — OXYCODONE 5 MG: 5 TABLET ORAL at 17:09

## 2022-09-15 RX ADMIN — METOCLOPRAMIDE HYDROCHLORIDE 10 MG: 10 TABLET ORAL at 06:40

## 2022-09-15 RX ADMIN — APIXABAN 5 MG: 5 TABLET, FILM COATED ORAL at 08:19

## 2022-09-15 RX ADMIN — OXYCODONE 5 MG: 5 TABLET ORAL at 21:18

## 2022-09-15 RX ADMIN — PANTOPRAZOLE SODIUM 40 MG: 40 TABLET, DELAYED RELEASE ORAL at 06:40

## 2022-09-15 RX ADMIN — APIXABAN 5 MG: 5 TABLET, FILM COATED ORAL at 21:05

## 2022-09-15 RX ADMIN — SODIUM CHLORIDE, PRESERVATIVE FREE 10 ML: 5 INJECTION INTRAVENOUS at 21:17

## 2022-09-15 RX ADMIN — METOCLOPRAMIDE HYDROCHLORIDE 10 MG: 10 TABLET ORAL at 16:59

## 2022-09-15 RX ADMIN — PANTOPRAZOLE SODIUM 40 MG: 40 TABLET, DELAYED RELEASE ORAL at 16:59

## 2022-09-15 RX ADMIN — METOCLOPRAMIDE HYDROCHLORIDE 10 MG: 10 TABLET ORAL at 21:05

## 2022-09-15 ASSESSMENT — PAIN DESCRIPTION - LOCATION
LOCATION: ABDOMEN

## 2022-09-15 ASSESSMENT — PAIN SCALES - GENERAL
PAINLEVEL_OUTOF10: 4
PAINLEVEL_OUTOF10: 5
PAINLEVEL_OUTOF10: 6
PAINLEVEL_OUTOF10: 6
PAINLEVEL_OUTOF10: 5
PAINLEVEL_OUTOF10: 6
PAINLEVEL_OUTOF10: 5
PAINLEVEL_OUTOF10: 0
PAINLEVEL_OUTOF10: 5
PAINLEVEL_OUTOF10: 6

## 2022-09-15 ASSESSMENT — PAIN DESCRIPTION - ONSET: ONSET: ON-GOING

## 2022-09-15 ASSESSMENT — PAIN - FUNCTIONAL ASSESSMENT
PAIN_FUNCTIONAL_ASSESSMENT: ACTIVITIES ARE NOT PREVENTED
PAIN_FUNCTIONAL_ASSESSMENT: PREVENTS OR INTERFERES WITH ALL ACTIVE AND SOME PASSIVE ACTIVITIES
PAIN_FUNCTIONAL_ASSESSMENT: ACTIVITIES ARE NOT PREVENTED

## 2022-09-15 ASSESSMENT — PAIN DESCRIPTION - DESCRIPTORS
DESCRIPTORS: ACHING;CRAMPING;NAGGING
DESCRIPTORS: CRAMPING
DESCRIPTORS: ACHING
DESCRIPTORS: CRAMPING;ACHING;PRESSURE

## 2022-09-15 ASSESSMENT — PAIN DESCRIPTION - FREQUENCY: FREQUENCY: CONTINUOUS

## 2022-09-15 ASSESSMENT — PAIN DESCRIPTION - ORIENTATION
ORIENTATION: UPPER;LOWER

## 2022-09-15 ASSESSMENT — PAIN DESCRIPTION - PAIN TYPE: TYPE: CHRONIC PAIN

## 2022-09-15 NOTE — PROGRESS NOTES
[facility] Hematology & Oncology        Inpatient Hematology / Oncology Progress Note      Admission Date: 2022  5:06 PM  Reason for Admission/Hospital Course: Hematemesis [K92.0]  Generalized abdominal pain [R10.84]  Intractable vomiting with nausea, unspecified vomiting type [R11.2]      24 Hour Events:  EGD neg for bleed  Para removed 3800-studies pending  Hgb stable  Feels weak  Having nausea      ROS:  Constitutional: negative for fever, chills. +weakness, malaise fatigue. CV: negative for chest pain, palpitations, edema. Respiratory: negative for dyspnea, cough, wheezing. GI: negative for abdominal pain, diarrhea. +nausea    10 point review of systems is otherwise negative with the exception of the elements mentioned above in the HPI. No Known Allergies    OBJECTIVE:  Patient Vitals for the past 8 hrs:   BP Temp Temp src Pulse Resp SpO2   09/15/22 1050 -- -- -- -- 16 --   09/15/22 1020 -- -- -- -- 16 --   09/15/22 0856 (!) 140/89 98.1 °F (36.7 °C) Oral 80 16 98 %   09/15/22 0710 -- -- -- -- 16 --   09/15/22 0416 (!) 137/94 98.1 °F (36.7 °C) Oral 92 -- 98 %     Temp (24hrs), Av.2 °F (36.8 °C), Min:98.1 °F (36.7 °C), Max:98.4 °F (36.9 °C)    09/15 0701 - 09/15 1900  In: -   Out: 250 [Urine:250]    Physical Exam:  Constitutional: Well developed, thin appearing female in no acute distress, sitting comfortably in the hospital bed. HEENT: Normocephalic and atraumatic. Oropharynx is clear, mucous membranes are moist.  Pupils are equal, round, and reactive to light. Extraocular muscles are intact. Sclerae anicteric. Skin Warm and dry. No bruising and no rash noted. No erythema. No pallor. Respiratory Lungs are clear to auscultation bilaterally without wheezes, rales or rhonchi, normal air exchange without accessory muscle use. CVS Normal rate, regular rhythm and normal S1 and S2. No murmurs, gallops, or rubs. Abdomen Soft, mildly tender and distended, normoactive bowel sounds. No palpable mass. No hepatosplenomegaly. Neuro Grossly nonfocal with no obvious sensory or motor deficits. MSK Normal range of motion in general.  No edema and no tenderness. Psych Appropriate mood and affect.         Labs:      Recent Labs     09/13/22  0556 09/14/22  0011 09/14/22  0818 09/15/22  0309   WBC 5.8  --  4.7 4.8   RBC 3.16*  --  3.49* 3.30*   HGB 8.9* 9.2* 9.8* 9.3*   HCT 29.8*  --  32.8* 30.4*   MCV 94.3  --  94.0 92.1   MCH 28.2  --  28.1 28.2   MCHC 29.9*  --  29.9* 30.6*   RDW 13.5  --  13.9 14.1     --  261 260   MPV 10.4  --  10.3 10.5        Recent Labs     09/13/22  0556 09/14/22  0818 09/15/22  0309    136 136   K 2.8* 4.2 4.2   * 103 102   CO2 20* 26 27   BUN 13 14 21   GFRAA >60 >60 >60   GLOB  --   --  3.6*   MG 1.6*  --   --          Imaging:    Medications:  Current Facility-Administered Medications   Medication Dose Route Frequency    LORazepam (ATIVAN) injection 0.5 mg  0.5 mg IntraVENous Q6H PRN    HYDROcodone-acetaminophen (NORCO) 5-325 MG per tablet 1 tablet  1 tablet Oral Q4H PRN    pantoprazole (PROTONIX) tablet 40 mg  40 mg Oral BID AC    diatrizoate meglumine-sodium (GASTROGRAFIN) 66-10 % solution 15 mL  15 mL Oral ONCE PRN    apixaban (ELIQUIS) tablet 5 mg  5 mg Oral BID    docusate sodium (COLACE) capsule 100 mg  100 mg Oral BID    metoclopramide (REGLAN) tablet 10 mg  10 mg Oral 4x Daily AC & HS    sodium chloride flush 0.9 % injection 5-40 mL  5-40 mL IntraVENous 2 times per day    sodium chloride flush 0.9 % injection 5-40 mL  5-40 mL IntraVENous PRN    0.9 % sodium chloride infusion   IntraVENous PRN    ondansetron (ZOFRAN-ODT) disintegrating tablet 4 mg  4 mg Oral Q8H PRN    Or    ondansetron (ZOFRAN) injection 4 mg  4 mg IntraVENous Q6H PRN    polyethylene glycol (GLYCOLAX) packet 17 g  17 g Oral Daily PRN    acetaminophen (TYLENOL) tablet 650 mg  650 mg Oral Q6H PRN    Or    acetaminophen (TYLENOL) suppository 650 mg  650 mg Rectal Q6H PRN sympathetic and not distended.  -Para with studies tomorrow-EGD today  9/14 EGD negative for bleed. Para -3800 studies ordered. Advance diet. 9/15 having nausea-antiemetics     History of PE  -Eliquis on hold due to hematemesis  9/14 resume Eliquis     Hematemesis  -PPI BID  -GI planning EGD today  -Hgb 8.9 (11.8)  9/14 hgb stable 9.2    Goals and plan of care reviewed with the patient. All questions answered to the best of our ability. Home tomorrow. SCOTT Aldana - NP   [facility] Hematology & Oncology  [address] [place] [address]  Office : [phone]  Fax : [phone]        Attending Addendum:  I have personally performed a face to face diagnostic evaluation on this patient. I have reviewed and agree with the care plan as documented by Summer Osborne, N.P. 36 minutes were spent on patient care, including but not limited to, reviewing the chart and time with the patient and family, more than 50% of the time documented was spent in face-to-face contact with the patient and in the care of the patient on the floor/unit where the patient is located. My findings are as follows: She has CUPS, appears weak, heart rate regular without murmurs, abdomen is non-tender, bowel sounds are positive, we will continue IV anti-emetics.               Yamila Ashley MD    [address] [place] [address] Hematology/Oncology  [address] [place] [address]  Office : [phone]  Fax : [phone]

## 2022-09-15 NOTE — PLAN OF CARE
Problem: Discharge Planning  Goal: Discharge to home or other facility with appropriate resources  Outcome: Progressing  Flowsheets (Taken 9/15/2022 0750)  Discharge to home or other facility with appropriate resources: Identify barriers to discharge with patient and caregiver     Problem: Safety - Adult  Goal: Free from fall injury  Outcome: Progressing     Problem: Skin/Tissue Integrity  Goal: Absence of new skin breakdown  Description: 1. Monitor for areas of redness and/or skin breakdown  2. Assess vascular access sites hourly  3. Every 4-6 hours minimum:  Change oxygen saturation probe site  4. Every 4-6 hours:  If on nasal continuous positive airway pressure, respiratory therapy assess nares and determine need for appliance change or resting period.   Outcome: Progressing

## 2022-09-16 LAB
ALBUMIN SERPL-MCNC: 2.9 G/DL (ref 3.5–5)
ALBUMIN/GLOB SERPL: 0.8 {RATIO} (ref 1.2–3.5)
ALP SERPL-CCNC: 61 U/L (ref 50–136)
ALT SERPL-CCNC: 8 U/L (ref 12–65)
ANION GAP SERPL CALC-SCNC: 6 MMOL/L (ref 4–13)
APPEARANCE UR: ABNORMAL
AST SERPL-CCNC: 10 U/L (ref 15–37)
BACTERIA SPEC CULT: NORMAL
BACTERIA URNS QL MICRO: ABNORMAL /HPF
BASOPHILS # BLD: 0 K/UL (ref 0–0.2)
BASOPHILS NFR BLD: 0 % (ref 0–2)
BILIRUB SERPL-MCNC: 0.5 MG/DL (ref 0.2–1.1)
BILIRUB UR QL: NEGATIVE
BUN SERPL-MCNC: 28 MG/DL (ref 6–23)
CALCIUM SERPL-MCNC: 9.6 MG/DL (ref 8.3–10.4)
CHLORIDE SERPL-SCNC: 99 MMOL/L (ref 101–110)
CO2 SERPL-SCNC: 29 MMOL/L (ref 21–32)
COLOR UR: ABNORMAL
CREAT SERPL-MCNC: 1.1 MG/DL (ref 0.6–1)
DIFFERENTIAL METHOD BLD: ABNORMAL
EOSINOPHIL # BLD: 0 K/UL (ref 0–0.8)
EOSINOPHIL NFR BLD: 1 % (ref 0.5–7.8)
EPI CELLS #/AREA URNS HPF: ABNORMAL /HPF
ERYTHROCYTE [DISTWIDTH] IN BLOOD BY AUTOMATED COUNT: 14.3 % (ref 11.9–14.6)
GLOBULIN SER CALC-MCNC: 3.6 G/DL (ref 2.3–3.5)
GLUCOSE SERPL-MCNC: 131 MG/DL (ref 65–100)
GLUCOSE UR STRIP.AUTO-MCNC: NEGATIVE MG/DL
GRAM STN SPEC: NORMAL
GRAM STN SPEC: NORMAL
HCT VFR BLD AUTO: 30.5 % (ref 35.8–46.3)
HGB BLD-MCNC: 9.3 G/DL (ref 11.7–15.4)
HGB UR QL STRIP: ABNORMAL
IMM GRANULOCYTES # BLD AUTO: 0 K/UL (ref 0–0.5)
IMM GRANULOCYTES NFR BLD AUTO: 0 % (ref 0–5)
KETONES UR QL STRIP.AUTO: ABNORMAL MG/DL
LEUKOCYTE ESTERASE UR QL STRIP.AUTO: ABNORMAL
LYMPHOCYTES # BLD: 0.6 K/UL (ref 0.5–4.6)
LYMPHOCYTES NFR BLD: 13 % (ref 13–44)
MCH RBC QN AUTO: 27.8 PG (ref 26.1–32.9)
MCHC RBC AUTO-ENTMCNC: 30.5 G/DL (ref 31.4–35)
MCV RBC AUTO: 91.3 FL (ref 79.6–97.8)
MONOCYTES # BLD: 0.7 K/UL (ref 0.1–1.3)
MONOCYTES NFR BLD: 15 % (ref 4–12)
MUCOUS THREADS URNS QL MICRO: ABNORMAL /LPF
NEUTS SEG # BLD: 3.2 K/UL (ref 1.7–8.2)
NEUTS SEG NFR BLD: 71 % (ref 43–78)
NITRITE UR QL STRIP.AUTO: NEGATIVE
NRBC # BLD: 0 K/UL (ref 0–0.2)
OTHER OBSERVATIONS: ABNORMAL
PH UR STRIP: 5 [PH] (ref 5–9)
PLATELET # BLD AUTO: 270 K/UL (ref 150–450)
PMV BLD AUTO: 10.4 FL (ref 9.4–12.3)
POTASSIUM SERPL-SCNC: 4 MMOL/L (ref 3.5–5.1)
PROT SERPL-MCNC: 6.5 G/DL (ref 6.3–8.2)
PROT UR STRIP-MCNC: 100 MG/DL
RBC # BLD AUTO: 3.34 M/UL (ref 4.05–5.2)
RBC #/AREA URNS HPF: >100 /HPF
SERVICE CMNT-IMP: NORMAL
SODIUM SERPL-SCNC: 134 MMOL/L (ref 136–145)
SP GR UR REFRACTOMETRY: 1.02 (ref 1–1.02)
UROBILINOGEN UR QL STRIP.AUTO: 0.2 EU/DL (ref 0.2–1)
WBC # BLD AUTO: 4.6 K/UL (ref 4.3–11.1)
WBC URNS QL MICRO: ABNORMAL /HPF

## 2022-09-16 PROCEDURE — 2500000003 HC RX 250 WO HCPCS: Performed by: NURSE PRACTITIONER

## 2022-09-16 PROCEDURE — 85025 COMPLETE CBC W/AUTO DIFF WBC: CPT

## 2022-09-16 PROCEDURE — 2580000003 HC RX 258: Performed by: FAMILY MEDICINE

## 2022-09-16 PROCEDURE — 97161 PT EVAL LOW COMPLEX 20 MIN: CPT

## 2022-09-16 PROCEDURE — APPNB15 APP NON BILLABLE TIME 0-15 MINS: Performed by: NURSE PRACTITIONER

## 2022-09-16 PROCEDURE — 6370000000 HC RX 637 (ALT 250 FOR IP): Performed by: INTERNAL MEDICINE

## 2022-09-16 PROCEDURE — 6370000000 HC RX 637 (ALT 250 FOR IP): Performed by: FAMILY MEDICINE

## 2022-09-16 PROCEDURE — 99233 SBSQ HOSP IP/OBS HIGH 50: CPT | Performed by: INTERNAL MEDICINE

## 2022-09-16 PROCEDURE — 1100000000 HC RM PRIVATE

## 2022-09-16 PROCEDURE — A4216 STERILE WATER/SALINE, 10 ML: HCPCS | Performed by: NURSE PRACTITIONER

## 2022-09-16 PROCEDURE — 6360000002 HC RX W HCPCS: Performed by: NURSE PRACTITIONER

## 2022-09-16 PROCEDURE — 81001 URINALYSIS AUTO W/SCOPE: CPT

## 2022-09-16 PROCEDURE — 2580000003 HC RX 258: Performed by: NURSE PRACTITIONER

## 2022-09-16 PROCEDURE — 6360000002 HC RX W HCPCS: Performed by: FAMILY MEDICINE

## 2022-09-16 PROCEDURE — 87086 URINE CULTURE/COLONY COUNT: CPT

## 2022-09-16 PROCEDURE — 80053 COMPREHEN METABOLIC PANEL: CPT

## 2022-09-16 PROCEDURE — 97530 THERAPEUTIC ACTIVITIES: CPT

## 2022-09-16 RX ORDER — MORPHINE SULFATE 2 MG/ML
2 INJECTION, SOLUTION INTRAMUSCULAR; INTRAVENOUS EVERY 4 HOURS PRN
Status: DISPENSED | OUTPATIENT
Start: 2022-09-16 | End: 2022-09-18

## 2022-09-16 RX ORDER — SODIUM CHLORIDE 9 MG/ML
INJECTION, SOLUTION INTRAVENOUS CONTINUOUS
Status: DISCONTINUED | OUTPATIENT
Start: 2022-09-16 | End: 2022-09-24

## 2022-09-16 RX ORDER — PANTOPRAZOLE SODIUM 40 MG/1
40 TABLET, DELAYED RELEASE ORAL 2 TIMES DAILY
Qty: 60 TABLET | Refills: 0 | Status: ON HOLD | OUTPATIENT
Start: 2022-09-16 | End: 2022-10-26 | Stop reason: SDUPTHER

## 2022-09-16 RX ADMIN — OXYCODONE 5 MG: 5 TABLET ORAL at 03:16

## 2022-09-16 RX ADMIN — METOCLOPRAMIDE HYDROCHLORIDE 10 MG: 10 TABLET ORAL at 08:44

## 2022-09-16 RX ADMIN — OXYCODONE 5 MG: 5 TABLET ORAL at 15:12

## 2022-09-16 RX ADMIN — METOCLOPRAMIDE HYDROCHLORIDE 10 MG: 10 TABLET ORAL at 17:14

## 2022-09-16 RX ADMIN — DOCUSATE SODIUM 100 MG: 100 CAPSULE, LIQUID FILLED ORAL at 08:44

## 2022-09-16 RX ADMIN — SODIUM CHLORIDE: 9 INJECTION, SOLUTION INTRAVENOUS at 18:09

## 2022-09-16 RX ADMIN — MORPHINE SULFATE 2 MG: 2 INJECTION, SOLUTION INTRAMUSCULAR; INTRAVENOUS at 15:50

## 2022-09-16 RX ADMIN — PANTOPRAZOLE SODIUM 40 MG: 40 TABLET, DELAYED RELEASE ORAL at 08:44

## 2022-09-16 RX ADMIN — PANTOPRAZOLE SODIUM 40 MG: 40 TABLET, DELAYED RELEASE ORAL at 15:14

## 2022-09-16 RX ADMIN — APIXABAN 5 MG: 5 TABLET, FILM COATED ORAL at 20:28

## 2022-09-16 RX ADMIN — ONDANSETRON 4 MG: 2 INJECTION INTRAMUSCULAR; INTRAVENOUS at 15:14

## 2022-09-16 RX ADMIN — FOSAPREPITANT 150 MG: 150 INJECTION, POWDER, LYOPHILIZED, FOR SOLUTION INTRAVENOUS at 17:13

## 2022-09-16 RX ADMIN — SODIUM CHLORIDE, PRESERVATIVE FREE 10 ML: 5 INJECTION INTRAVENOUS at 08:55

## 2022-09-16 RX ADMIN — CEFTRIAXONE 1000 MG: 1 INJECTION, POWDER, FOR SOLUTION INTRAMUSCULAR; INTRAVENOUS at 19:27

## 2022-09-16 RX ADMIN — SODIUM CHLORIDE: 9 INJECTION, SOLUTION INTRAVENOUS at 08:55

## 2022-09-16 RX ADMIN — METOCLOPRAMIDE HYDROCHLORIDE 10 MG: 10 TABLET ORAL at 12:45

## 2022-09-16 RX ADMIN — ONDANSETRON 4 MG: 2 INJECTION INTRAMUSCULAR; INTRAVENOUS at 08:55

## 2022-09-16 RX ADMIN — APIXABAN 5 MG: 5 TABLET, FILM COATED ORAL at 08:44

## 2022-09-16 RX ADMIN — FAMOTIDINE 20 MG: 10 INJECTION, SOLUTION INTRAVENOUS at 12:46

## 2022-09-16 RX ADMIN — ONDANSETRON 4 MG: 2 INJECTION INTRAMUSCULAR; INTRAVENOUS at 03:16

## 2022-09-16 RX ADMIN — METOCLOPRAMIDE HYDROCHLORIDE 10 MG: 10 TABLET ORAL at 20:28

## 2022-09-16 RX ADMIN — DOCUSATE SODIUM 100 MG: 100 CAPSULE, LIQUID FILLED ORAL at 20:28

## 2022-09-16 RX ADMIN — SODIUM CHLORIDE, PRESERVATIVE FREE 10 ML: 5 INJECTION INTRAVENOUS at 20:29

## 2022-09-16 ASSESSMENT — PAIN DESCRIPTION - ORIENTATION
ORIENTATION: MID
ORIENTATION: MID

## 2022-09-16 ASSESSMENT — PAIN DESCRIPTION - LOCATION
LOCATION: ABDOMEN
LOCATION: ABDOMEN

## 2022-09-16 ASSESSMENT — PAIN SCALES - GENERAL
PAINLEVEL_OUTOF10: 0
PAINLEVEL_OUTOF10: 9
PAINLEVEL_OUTOF10: 8
PAINLEVEL_OUTOF10: 0
PAINLEVEL_OUTOF10: 6

## 2022-09-16 ASSESSMENT — PAIN - FUNCTIONAL ASSESSMENT
PAIN_FUNCTIONAL_ASSESSMENT: PREVENTS OR INTERFERES WITH MANY ACTIVE NOT PASSIVE ACTIVITIES
PAIN_FUNCTIONAL_ASSESSMENT: PREVENTS OR INTERFERES SOME ACTIVE ACTIVITIES AND ADLS

## 2022-09-16 ASSESSMENT — PAIN DESCRIPTION - DESCRIPTORS: DESCRIPTORS: ACHING

## 2022-09-16 NOTE — PROGRESS NOTES
with no obvious sensory or motor deficits. MSK Normal range of motion in general.  No edema and no tenderness. Psych Appropriate mood and affect.         Labs:      Recent Labs     09/14/22  0818 09/15/22  0309 09/16/22  0300   WBC 4.7 4.8 4.6   RBC 3.49* 3.30* 3.34*   HGB 9.8* 9.3* 9.3*   HCT 32.8* 30.4* 30.5*   MCV 94.0 92.1 91.3   MCH 28.1 28.2 27.8   MCHC 29.9* 30.6* 30.5*   RDW 13.9 14.1 14.3    260 270   MPV 10.3 10.5 10.4        Recent Labs     09/14/22  0818 09/15/22  0309 09/16/22  0300    136 134*   K 4.2 4.2 4.0    102 99*   CO2 26 27 29   BUN 14 21 28*   GFRAA >60 >60 >60   GLOB  --  3.6* 3.6*         Imaging:    Medications:  Current Facility-Administered Medications   Medication Dose Route Frequency    0.9 % sodium chloride infusion   IntraVENous Continuous    famotidine (PEPCID) 20 mg in sodium chloride (PF) 10 mL injection  20 mg IntraVENous Once    LORazepam (ATIVAN) injection 0.5 mg  0.5 mg IntraVENous Q6H PRN    HYDROcodone-acetaminophen (NORCO) 5-325 MG per tablet 1 tablet  1 tablet Oral Q4H PRN    pantoprazole (PROTONIX) tablet 40 mg  40 mg Oral BID AC    diatrizoate meglumine-sodium (GASTROGRAFIN) 66-10 % solution 15 mL  15 mL Oral ONCE PRN    apixaban (ELIQUIS) tablet 5 mg  5 mg Oral BID    docusate sodium (COLACE) capsule 100 mg  100 mg Oral BID    metoclopramide (REGLAN) tablet 10 mg  10 mg Oral 4x Daily AC & HS    sodium chloride flush 0.9 % injection 5-40 mL  5-40 mL IntraVENous 2 times per day    sodium chloride flush 0.9 % injection 5-40 mL  5-40 mL IntraVENous PRN    0.9 % sodium chloride infusion   IntraVENous PRN    ondansetron (ZOFRAN-ODT) disintegrating tablet 4 mg  4 mg Oral Q8H PRN    Or    ondansetron (ZOFRAN) injection 4 mg  4 mg IntraVENous Q6H PRN    polyethylene glycol (GLYCOLAX) packet 17 g  17 g Oral Daily PRN    acetaminophen (TYLENOL) tablet 650 mg  650 mg Oral Q6H PRN    Or    acetaminophen (TYLENOL) suppository 650 mg  650 mg Rectal Q6H PRN potassium chloride (KLOR-CON M) extended release tablet 40 mEq  40 mEq Oral PRN    Or    potassium bicarb-citric acid (EFFER-K) effervescent tablet 40 mEq  40 mEq Oral PRN    Or    potassium chloride 10 mEq/100 mL IVPB (Peripheral Line)  10 mEq IntraVENous PRN    magnesium sulfate 2000 mg in 50 mL IVPB premix  2,000 mg IntraVENous PRN    oxyCODONE (ROXICODONE) immediate release tablet 5 mg  5 mg Oral Q4H PRN    [Held by provider] morphine injection 1 mg  1 mg IntraVENous Q4H PRN     Ms. Kirt Aguilar is a 52 y.o. female with medical history of PE on Eliquis and metastatic carcinoma of unknown primary followed by Dr. Nuvia Fernandes currently sp cycle 9 FOLFOX on 9/8/2022. She had ex-lap on 8/2022 to eval for debulking but tumor was found to be tightly adherent and impossible to debulk. She presented to ED with 1 day duration of hematemesis. Pt reported she has been feeling abdominal distention that has progressively worsened over 1 week duration associated with mild abdominal discomfort. She stated that yesterday she vomited up what looked like bloody substance however she was unsure as she has drank red Gatorade earlier in the day. Per notes she had another episode of emesis in ED however at that time was nonbloody. CT AP shows large amount of intra-abdominal ascites with mesenteric/peritoneal nodularity concerning for peritoneal carcinomatosis; bilateral hydronephrosis with ureteral stents in place; thick-walled urinary bladder; thick-walled gallbladder felt to be sympathetic and not distended. IR is consulted for paracentesis with studies ordered. Eliquis is on hold and GI is consulted.        PLAN:  CUP now with large volume ascites  -sp cycle 9 FOLFOX on 9/8/2022.  - CT AP shows large amount of intra-abdominal ascites with mesenteric/peritoneal nodularity concerning for peritoneal carcinomatosis; bilateral hydronephrosis with ureteral stents in place; thick-walled urinary bladder; thick-walled gallbladder felt to be sympathetic and not distended.  -Para with studies tomorrow-EGD today  9/14 EGD negative for bleed. Para -3800 studies ordered. Advance diet. 9/15 having nausea-antiemetics  9/16 complaining of acid reflux-added one time IV Pepcid-already on PO protonix bid     History of PE  -Eliquis on hold due to hematemesis  9/14 resume Eliquis     Hematemesis  -PPI BID  -GI planning EGD today  -Hgb 8.9 (11.8)  9/14 hgb stable 9.2    Elevated creatinine  9/16 added fluids NS @ 75/hr    Goals and plan of care reviewed with the patient. All questions answered to the best of our ability. Patient does not feel like going home today. Consult PT and OT. SCOTT Johnson - NP   [facility] Hematology & Oncology  [address] [place] [address]  Office : [phone]  Fax : [phone]            Attending Addendum:  I have personally performed a face to face diagnostic evaluation on this patient. I have reviewed and agree with the care plan as documented by Tiffani Morrison, N.P. 36 minutes were spent on patient care, including but not limited to, reviewing the chart and time with the patient and family, more than 50% of the time documented was spent in face-to-face contact with the patient and in the care of the patient on the floor/unit where the patient is located. My findings are as follows: She has CUPS, appears weak, heart rate regular without murmurs, abdomen is non-tender, bowel sounds are positive, we will continue anti-emetics.               Rosalina Quiros MD    [facility] Hematology/Oncology  [address] [place] [address]  Office : [phone]  Fax : [phone]

## 2022-09-16 NOTE — CARE COORDINATION
PT/ OT jeremiah recommend Home Health.  Patient is agreeable    Home health ordered with [facility] via [facility]     Discharge plan is home with [address], PT and OT    Will continue to follow for discharge planning needs  Please consult  if any new issues arise

## 2022-09-16 NOTE — PROGRESS NOTES
Noted cystitis on CT. UA on 9/13 c/w UTI - not treated. Repeat UA. Ucx ordered.       Lukas Alonzo, APRN - [address] Hematology & Oncology

## 2022-09-16 NOTE — PLAN OF CARE
Patient has remained A&O x 4. Urine has remained brown and malodorous. Urine sent to lab per order. Pt has not been eating/drinking due to nausea and vomiting. Pt finally had some relief after receiving Emend 150 mg IV once. Patient rounded on hourly by myself or patient assistant and encouraged to call with any needs. No signs of distress noted. Bed low, locked, call bell within reach. END OF SHIFT SUMMARY:    Significant vitals this shift:  periodic elevated BP      I/Os:  +/- this shift:   09/16 0701 - 09/16 1900  In: 1120 [I.V.:1120]  Out: 475 [Urine:475]  Occurrences this Shift:  BM 0, Emesis 2    ANGELO LING RN      Problem: Discharge Planning  Goal: Discharge to home or other facility with appropriate resources  Outcome: Progressing     Problem: Safety - Adult  Goal: Free from fall injury  Outcome: Progressing     Problem: Skin/Tissue Integrity  Goal: Absence of new skin breakdown  Description: 1. Monitor for areas of redness and/or skin breakdown  2. Assess vascular access sites hourly  3. Every 4-6 hours minimum:  Change oxygen saturation probe site  4. Every 4-6 hours:  If on nasal continuous positive airway pressure, respiratory therapy assess nares and determine need for appliance change or resting period.   Outcome: Progressing

## 2022-09-16 NOTE — PROGRESS NOTES
Pt has remained very nauseated despite scheduled reglan and protonix and PRN zofran. Pt voiced c/o abdominal pain an nausea. IV zofran given then pt given PO Protonix and oxycodone which she immediately threw up. Spoke with Aggie Araujo NP and orders received for Emend  mg once and morphine 2 mg Q4H PRN.

## 2022-09-16 NOTE — PROGRESS NOTES
PHYSICAL THERAPY Initial Assessment, Daily Note, and PM  (Link to Caseload Tracking: PT Visit Days : 1  Acknowledge Orders  Time In/Out  PT Charge Capture  Rehab Caseload Tracker    Roro Shi is a 52 y.o. female   PRIMARY DIAGNOSIS: Hematemesis  Hematemesis [K92.0]  Generalized abdominal pain [R10.84]  Intractable vomiting with nausea, unspecified vomiting type [R11.2]  Procedure(s) (LRB):  EGD ESOPHAGOGASTRODUODENOSCOPY OFL 17 To IR after recovery for Para (N/A)  3 Days Post-Op  Reason for Referral: Generalized Muscle Weakness (M62.81)  Difficulty in walking, Not elsewhere classified (R26.2)  Other abnormalities of gait and mobility (R26.89)  Inpatient: Payor: Nitza Swenson / Plan: Ludivina Del Toro / Product Type: *No Product type* /     ASSESSMENT:     REHAB RECOMMENDATIONS:   Recommendation to date pending progress:  Setting:  Home Health Therapy    Equipment:    None  To Be Determined     ASSESSMENT:  Ms. Rafia Araujo Is a 52 y.o. female presenting to PT after being admitted on 9/12 for hematemesis. At time of initial evaluation, pt presents slightly below baseline LOF with deficits in standing dynamic balance, gait and activity tolerance limiting her overall functional mobility. Today, pt performed all mobility Mod (I)/ SB (A) including ambulation of 30'x1 without use of an AD. Of note, pt ambulating c notably inc sway, narrow GABINO and infrequent miss-steps although she ultimately did well to avoid any significant LOB. While moving about on their feet, pt denying any dizziness, lightheadedness or SOB although she did endorse generalized weakness. At this time, pt is an appropriate candidate for skilled PT and will benefit from POC designed to address the aforementioned deficits. Upon completion of treatment, pt was positioned to comfort in bed with needs in reach. RN was made aware of pt performance.      DC Recommendation: [address] [facility]-PAC 6 [facility] Basic Mobility Inpatient Short Form  -PAC Mobility Inpatient   How much difficulty turning over in bed?: None  How much difficulty sitting down on / standing up from a chair with arms?: None  How much difficulty moving from lying on back to sitting on side of bed?: None  How much help from another person moving to and from a bed to a chair?: A Little  How much help from another person needed to walk in hospital room?: A Little  How much help from another person for climbing 3-5 steps with a railing?: A Lot  AM-PAC Inpatient Mobility Raw Score : 20  AM-PAC Inpatient T-Scale Score : 47.67  Mobility Inpatient CMS 0-100% Score: 35.83  Mobility Inpatient CMS G-Code Modifier : CJ    SUBJECTIVE:   Ms. Vesna Beverly states, \"I move a little better at home\"     Social/Functional      OBJECTIVE:     PAIN: Guinebatsheva Mejia / O2: Aaron Sargent / Gee Summers / Aviva Merle:   Pre Treatment: 0/10         Post Treatment: 0/10 Vitals        Oxygen  NA    IV    RESTRICTIONS/PRECAUTIONS:                    GROSS EVALUATION: Intact Impaired (Comments):   AROM []  WNL   PROM []    Strength []  Generally weak but WFL for mobility and tranfers    Balance []  Inc sway and miss-steps   Posture [] Forward Head  Rounded Shoulders   Sensation []  WNL   Coordination []   WNL   Tone []     Edema []    Activity Tolerance []  Below baseline level    []      COGNITION/  PERCEPTION: Intact Impaired (Comments):   Orientation [x]     Vision [x]  Not Formally Assessed    Hearing [x]  Not Formally Assessed    Cognition  [x]       MOBILITY: I Mod I S SBA CGA Min Mod Max Total  NT x2 Comments:   Bed Mobility    Rolling [] [x] [] [] [] [] [] [] [] [] []    Supine to Sit [] [x] [] [] [] [] [] [] [] [] []    Scooting [] [x] [] [] [] [] [] [] [] [] []    Sit to Supine [] [] [] [] [] [] [] [] [] [] []    Transfers    Sit to Stand [] [] [] [x] [] [] [] [] [] [] []    Bed to Chair [] [] [] [] [] [] [] [] [] [] []    Stand to Sit [] [] [] [x] [] [] [] [] [] [] []     [] [] [] [] [] [] [] [] [] [] []    I=Independent, Mod I=Modified Independent, S=Supervision, SBA=Standby Assistance, CGA=Contact Guard Assistance,   Min=Minimal Assistance, Mod=Moderate Assistance, Max=Maximal Assistance, Total=Total Assistance, NT=Not Tested    GAIT: I Mod I S SBA CGA Min Mod Max Total  NT x2 Comments:   Level of Assistance [] [] [] [x] [] [] [] [] [] [] []    Distance  30'x1     DME N/A    Gait Quality Decreased mason , Decreased step clearance, Decreased step length, Narrow base of support, Path deviations , and Trunk sway increased    Weightbearing Status      Stairs      I=Independent, Mod I=Modified Independent, S=Supervision, SBA=Standby Assistance, CGA=Contact Guard Assistance,   Min=Minimal Assistance, Mod=Moderate Assistance, Max=Maximal Assistance, Total=Total Assistance, NT=Not Tested    PLAN:   ACUTE PHYSICAL THERAPY GOALS:   (Developed with and agreed upon by patient and/or caregiver.)  Pt will perform all transfers Mod (I) without LOB/ miss-steps in 7 therapy sessions. Pt will ambulate 250 ft Mod (I) with use of LRAD and breaks as needed in 7 therapy sessions. Pt will perform standing dynamic balance activities with minimal postural sway in 7 therapy sessions. Pt will tolerate multiple sets and reps of BLE exercises in 7 therapy sessions. FREQUENCY AND DURATION:   for duration of hospital stay or until stated goals are met, whichever comes first.    THERAPY PROGNOSIS: Good    PROBLEM LIST:   (Skilled intervention is medically necessary to address:)  Decreased Activity Tolerance  Decreased Balance  Decreased Gait Ability  Decreased Strength INTERVENTIONS PLANNED:   (Benefits and precautions of physical therapy have been discussed with the patient.)  Self Care Training  Therapeutic Activity  Therapeutic Exercise/HEP  Gait Training  Education       TREATMENT:   EVALUATION: LOW COMPLEXITY: (Untimed Charge)    TREATMENT:   Therapeutic Activity (8 Minutes):  Therapeutic activity included Rolling, Supine to Sit, Sit to Supine, Scooting, Lateral Scooting, Ambulation on level ground, Sitting balance , and Standing balance to improve functional Activity tolerance, Balance, Mobility, and Strength.     TREATMENT GRID:  N/A    AFTER TREATMENT PRECAUTIONS: Bed, Bed/Chair Locked, Call light within reach, Needs within reach, RN at bedside, and RN notified    INTERDISCIPLINARY COLLABORATION:  RN/ PCT and PT/ PTA    EDUCATION: Education Given To: Patient  Education Provided: Role of Therapy    TIME IN/OUT:  Time In: 1401  Time Out: [address] [place] [address]  Minutes: [address], PT

## 2022-09-17 ENCOUNTER — APPOINTMENT (OUTPATIENT)
Dept: GENERAL RADIOLOGY | Age: 47
DRG: 374 | End: 2022-09-17
Payer: COMMERCIAL

## 2022-09-17 LAB
ALBUMIN SERPL-MCNC: 2.2 G/DL (ref 3.5–5)
ALBUMIN/GLOB SERPL: 0.6 {RATIO} (ref 1.2–3.5)
ALP SERPL-CCNC: 58 U/L (ref 50–136)
ALT SERPL-CCNC: 9 U/L (ref 12–65)
ANION GAP SERPL CALC-SCNC: 9 MMOL/L (ref 4–13)
AST SERPL-CCNC: 15 U/L (ref 15–37)
BASOPHILS # BLD: 0 K/UL (ref 0–0.2)
BASOPHILS NFR BLD: 0 % (ref 0–2)
BILIRUB SERPL-MCNC: 0.4 MG/DL (ref 0.2–1.1)
BUN SERPL-MCNC: 30 MG/DL (ref 6–23)
CALCIUM SERPL-MCNC: 8.2 MG/DL (ref 8.3–10.4)
CHLORIDE SERPL-SCNC: 105 MMOL/L (ref 101–110)
CO2 SERPL-SCNC: 24 MMOL/L (ref 21–32)
CREAT SERPL-MCNC: 1 MG/DL (ref 0.6–1)
DIFFERENTIAL METHOD BLD: ABNORMAL
EOSINOPHIL # BLD: 0 K/UL (ref 0–0.8)
EOSINOPHIL NFR BLD: 0 % (ref 0.5–7.8)
ERYTHROCYTE [DISTWIDTH] IN BLOOD BY AUTOMATED COUNT: 14.9 % (ref 11.9–14.6)
GLOBULIN SER CALC-MCNC: 3.6 G/DL (ref 2.3–3.5)
GLUCOSE SERPL-MCNC: 107 MG/DL (ref 65–100)
HCT VFR BLD AUTO: 28.5 % (ref 35.8–46.3)
HGB BLD-MCNC: 8.7 G/DL (ref 11.7–15.4)
IMM GRANULOCYTES # BLD AUTO: 0 K/UL (ref 0–0.5)
IMM GRANULOCYTES NFR BLD AUTO: 0 % (ref 0–5)
LYMPHOCYTES # BLD: 0.6 K/UL (ref 0.5–4.6)
LYMPHOCYTES NFR BLD: 11 % (ref 13–44)
MCH RBC QN AUTO: 28.2 PG (ref 26.1–32.9)
MCHC RBC AUTO-ENTMCNC: 30.5 G/DL (ref 31.4–35)
MCV RBC AUTO: 92.5 FL (ref 79.6–97.8)
MONOCYTES # BLD: 0.7 K/UL (ref 0.1–1.3)
MONOCYTES NFR BLD: 14 % (ref 4–12)
NEUTS SEG # BLD: 3.9 K/UL (ref 1.7–8.2)
NEUTS SEG NFR BLD: 75 % (ref 43–78)
NRBC # BLD: 0 K/UL (ref 0–0.2)
PLATELET # BLD AUTO: 260 K/UL (ref 150–450)
PMV BLD AUTO: 10.6 FL (ref 9.4–12.3)
POTASSIUM SERPL-SCNC: 3.5 MMOL/L (ref 3.5–5.1)
PROT SERPL-MCNC: 5.8 G/DL (ref 6.3–8.2)
RBC # BLD AUTO: 3.08 M/UL (ref 4.05–5.2)
SODIUM SERPL-SCNC: 138 MMOL/L (ref 136–145)
WBC # BLD AUTO: 5.2 K/UL (ref 4.3–11.1)

## 2022-09-17 PROCEDURE — 80053 COMPREHEN METABOLIC PANEL: CPT

## 2022-09-17 PROCEDURE — A4216 STERILE WATER/SALINE, 10 ML: HCPCS | Performed by: NURSE PRACTITIONER

## 2022-09-17 PROCEDURE — 85025 COMPLETE CBC W/AUTO DIFF WBC: CPT

## 2022-09-17 PROCEDURE — 6370000000 HC RX 637 (ALT 250 FOR IP): Performed by: FAMILY MEDICINE

## 2022-09-17 PROCEDURE — 99233 SBSQ HOSP IP/OBS HIGH 50: CPT | Performed by: INTERNAL MEDICINE

## 2022-09-17 PROCEDURE — 6360000002 HC RX W HCPCS: Performed by: INTERNAL MEDICINE

## 2022-09-17 PROCEDURE — 1100000000 HC RM PRIVATE

## 2022-09-17 PROCEDURE — 2580000003 HC RX 258: Performed by: NURSE PRACTITIONER

## 2022-09-17 PROCEDURE — 6360000002 HC RX W HCPCS: Performed by: FAMILY MEDICINE

## 2022-09-17 PROCEDURE — 74018 RADEX ABDOMEN 1 VIEW: CPT

## 2022-09-17 PROCEDURE — 6370000000 HC RX 637 (ALT 250 FOR IP): Performed by: INTERNAL MEDICINE

## 2022-09-17 PROCEDURE — 6360000002 HC RX W HCPCS

## 2022-09-17 PROCEDURE — 6360000002 HC RX W HCPCS: Performed by: NURSE PRACTITIONER

## 2022-09-17 PROCEDURE — 2580000003 HC RX 258: Performed by: FAMILY MEDICINE

## 2022-09-17 PROCEDURE — APPSS30 APP SPLIT SHARED TIME 16-30 MINUTES: Performed by: INTERNAL MEDICINE

## 2022-09-17 PROCEDURE — C9113 INJ PANTOPRAZOLE SODIUM, VIA: HCPCS | Performed by: NURSE PRACTITIONER

## 2022-09-17 RX ORDER — ONDANSETRON 2 MG/ML
4 INJECTION INTRAMUSCULAR; INTRAVENOUS EVERY 4 HOURS PRN
Status: DISCONTINUED | OUTPATIENT
Start: 2022-09-17 | End: 2022-09-27 | Stop reason: HOSPADM

## 2022-09-17 RX ORDER — PROMETHAZINE HYDROCHLORIDE 25 MG/ML
6.25 INJECTION, SOLUTION INTRAMUSCULAR; INTRAVENOUS EVERY 6 HOURS PRN
Status: DISCONTINUED | OUTPATIENT
Start: 2022-09-17 | End: 2022-09-27 | Stop reason: HOSPADM

## 2022-09-17 RX ORDER — LORAZEPAM 2 MG/ML
1 INJECTION INTRAMUSCULAR EVERY 6 HOURS PRN
Status: DISCONTINUED | OUTPATIENT
Start: 2022-09-17 | End: 2022-09-27 | Stop reason: HOSPADM

## 2022-09-17 RX ORDER — MAGNESIUM HYDROXIDE/ALUMINUM HYDROXICE/SIMETHICONE 120; 1200; 1200 MG/30ML; MG/30ML; MG/30ML
30 SUSPENSION ORAL EVERY 6 HOURS PRN
Status: DISCONTINUED | OUTPATIENT
Start: 2022-09-17 | End: 2022-09-27 | Stop reason: HOSPADM

## 2022-09-17 RX ADMIN — SODIUM CHLORIDE, PRESERVATIVE FREE 10 ML: 5 INJECTION INTRAVENOUS at 22:43

## 2022-09-17 RX ADMIN — ONDANSETRON 4 MG: 2 INJECTION INTRAMUSCULAR; INTRAVENOUS at 03:12

## 2022-09-17 RX ADMIN — CEFTRIAXONE 1000 MG: 1 INJECTION, POWDER, FOR SOLUTION INTRAMUSCULAR; INTRAVENOUS at 19:37

## 2022-09-17 RX ADMIN — SODIUM CHLORIDE, PRESERVATIVE FREE 40 MG: 5 INJECTION INTRAVENOUS at 08:42

## 2022-09-17 RX ADMIN — ONDANSETRON 4 MG: 2 INJECTION INTRAMUSCULAR; INTRAVENOUS at 08:17

## 2022-09-17 RX ADMIN — SODIUM CHLORIDE, PRESERVATIVE FREE 40 MG: 5 INJECTION INTRAVENOUS at 21:15

## 2022-09-17 RX ADMIN — METOCLOPRAMIDE HYDROCHLORIDE 10 MG: 10 TABLET ORAL at 11:12

## 2022-09-17 RX ADMIN — METOCLOPRAMIDE HYDROCHLORIDE 10 MG: 10 TABLET ORAL at 21:16

## 2022-09-17 RX ADMIN — DOCUSATE SODIUM 100 MG: 100 CAPSULE, LIQUID FILLED ORAL at 21:15

## 2022-09-17 RX ADMIN — Medication 1 MG: at 15:32

## 2022-09-17 RX ADMIN — ONDANSETRON 4 MG: 2 INJECTION INTRAMUSCULAR; INTRAVENOUS at 21:15

## 2022-09-17 RX ADMIN — APIXABAN 5 MG: 5 TABLET, FILM COATED ORAL at 21:15

## 2022-09-17 RX ADMIN — PROMETHAZINE HYDROCHLORIDE 6.25 MG: 25 INJECTION INTRAMUSCULAR; INTRAVENOUS at 15:07

## 2022-09-17 RX ADMIN — ONDANSETRON 4 MG: 2 INJECTION INTRAMUSCULAR; INTRAVENOUS at 13:16

## 2022-09-17 RX ADMIN — Medication 1 MG: at 08:43

## 2022-09-17 RX ADMIN — APIXABAN 5 MG: 5 TABLET, FILM COATED ORAL at 08:51

## 2022-09-17 RX ADMIN — SODIUM CHLORIDE: 9 INJECTION, SOLUTION INTRAVENOUS at 22:57

## 2022-09-17 RX ADMIN — ALUMINUM HYDROXIDE, MAGNESIUM HYDROXIDE, AND SIMETHICONE 30 ML: 200; 200; 20 SUSPENSION ORAL at 03:09

## 2022-09-17 ASSESSMENT — PAIN SCALES - GENERAL
PAINLEVEL_OUTOF10: 0

## 2022-09-17 NOTE — PROGRESS NOTES
[facility] Hematology & Oncology        Inpatient Hematology / Oncology Progress Note      Admission Date: 2022  5:06 PM  Reason for Admission/Hospital Course: Hematemesis [K92.0]  Generalized abdominal pain [R10.84]  Intractable vomiting with nausea, unspecified vomiting type [R11.2]      24 Hour Events:  Afebrile, VSS  UA c/w UTI, on CTX  Ongoing vomiting, emesis this AM with fecal odor  KUB neg  States she feels like something is stuck in her esophagus    Transfusions: None  Replacements: None    ROS:  Constitutional: negative for fever, chills. +weakness, malaise fatigue. CV: negative for chest pain, palpitations, edema. Respiratory: negative for dyspnea, cough, wheezing. GI: +nausea/vomiting. negative for abdominal pain, diarrhea. 10 point review of systems is otherwise negative with the exception of the elements mentioned above in the HPI. No Known Allergies    OBJECTIVE:  Patient Vitals for the past 8 hrs:   BP Temp Temp src Pulse Resp SpO2   22 0727 125/81 97.4 °F (36.3 °C) Oral 91 18 98 %   22 0314 136/88 98 °F (36.7 °C) Oral 80 19 98 %     Temp (24hrs), Av.8 °F (36.6 °C), Min:97.3 °F (36.3 °C), Max:98.4 °F (36.9 °C)     0701 -  1900  In: -   Out: 280 [Urine:100]    Physical Exam:  Constitutional: Well developed, thin appearing female in no acute distress, sitting comfortably in the hospital bed. HEENT: Normocephalic and atraumatic. Oropharynx is clear, mucous membranes are moist.  Extraocular muscles are intact. Sclerae anicteric. Skin Warm and dry. No bruising and no rash noted. No erythema. No pallor. Respiratory Lungs are clear to auscultation bilaterally without wheezes, rales or rhonchi, normal air exchange without accessory muscle use. CVS Normal rate, regular rhythm and normal S1 and S2. No murmurs, gallops, or rubs. Abdomen Soft, mildly tender and distended, normoactive bowel sounds. No palpable mass. No hepatosplenomegaly.    Neuro Grossly nonfocal with no obvious sensory or motor deficits. MSK Normal range of motion in general.  No edema and no tenderness. Psych Appropriate mood and affect. Labs:      Recent Labs     09/15/22  0309 09/16/22  0300 09/17/22  0330   WBC 4.8 4.6 5.2   RBC 3.30* 3.34* 3.08*   HGB 9.3* 9.3* 8.7*   HCT 30.4* 30.5* 28.5*   MCV 92.1 91.3 92.5   MCH 28.2 27.8 28.2   MCHC 30.6* 30.5* 30.5*   RDW 14.1 14.3 14.9*    270 260   MPV 10.5 10.4 10.6        Recent Labs     09/15/22  0309 09/16/22  0300 09/17/22  0330    134* 138   K 4.2 4.0 3.5    99* 105   CO2 27 29 24   BUN 21 28* 30*   GFRAA >60 >60 >60   GLOB 3.6* 3.6* 3.6*         Imaging:  Xray Result (most recent):  XR ABDOMEN (KUB) (SINGLE AP VIEW) 09/17/2022    Narrative  KUB    CLINICAL INDICATION:  Vomiting, foul-smelling emesis, bilateral hydronephrosis  and ureteral stents. History of ascites, peritoneal carcinomatosis, unknown  primary. COMPARISON: CT 9/12/2022, radiography 3/2/2022    TECHNIQUE: AP view of the abdomen supine portable 9:22 AM    FINDINGS:  There are bilateral ureteral stents, similar in position compared  with recent CT. Generalized haziness again noted in keeping with ascites. Nonobstructive bowel gas pattern. Within limits of supine radiography no  evidence of developing free air, pneumatosis, pneumobilia, or osseous changes. Surgical suture material again projects over the stomach. There is a decreased  small amount of retained oral contrast in the GI tract. Lung bases demonstrate  no consolidation. Impression  1. No bowel obstruction evident. 2.  Ascites, bilateral ureteral stents as seen on recent CT.     CT Result (most recent):  CT ABDOMEN PELVIS W IV CONTRAST 09/12/2022    Narrative  EXAMINATION: CT  ABDOMEN / PELVIS   9/12/2022 8:06 PM    ACCESSION NUMBER:  [id number]    COMPARISON: 06/22/2022    INDICATION:  abdominal pain    TECHNIQUE: Contiguous axial computed tomographic images were obtained from the  domes of the diaphragm to the symphysis pubis following administration 100 mL  Iso-behzad 370. Coronal reconstructions were also performed. Oral contrast was also  administered. Radiation dose reduction techniques were used for this study. Our CT scanners  use one or all of the following: Automated exposure control, adjustment of the  mA and/or kV according to patient size, iterative reconstruction. FINDINGS:    Lung bases: The visualized lung bases are clear. The visualized portions of the  heart are unremarkable. Liver: Normal    Gallbladder: There is gallbladder wall thickening. The gallbladder is not  distended. Spleen: Normal    Adrenals: Normal    Pancreas: Normal    Kidneys: The kidneys enhance symmetrically. There is moderate to severe  bilateral hydronephrosis with ureteral stents in place. Bladder/: The urinary bladder is somewhat thick-walled. There is previous  hysterectomy. .    Bowel: There is large volume ascites. There is some peritoneal enhancement. There is not peritoneal nodularity. There is some nodularity within the omentum  and mesentery. There is previous gastric sleeve type procedure. The appendix is  normal.    Vessels: Normal    Abdominal wall: Intact    Bones: No suspicious lytic or blastic bony lesions. Impression  Large amount of intraabdominal ascites with mesenteric and peritoneal nodularity  as well as peritoneal enhancement is most concerning for peritoneal  carcinomatosis. Bilateral hydronephrosis with ureteral stents in place. Thick-walled urinary bladder. Correlate with evidence of cystitis. Thick-walled gallbladder is felt likely to be sympathetic. The gallbladder is  not distended.       Medications:  Current Facility-Administered Medications   Medication Dose Route Frequency    aluminum & magnesium hydroxide-simethicone (MAALOX) 200-200-20 MG/5ML suspension 30 mL  30 mL Oral Q6H PRN    LORazepam (ATIVAN) injection 1 mg  1 mg IntraVENous Q6H PRN    pantoprazole (PROTONIX) 40 mg in sodium chloride (PF) 10 mL injection  40 mg IntraVENous Q12H    ondansetron (ZOFRAN) injection 4 mg  4 mg IntraVENous Q4H PRN    0.9 % sodium chloride infusion   IntraVENous Continuous    morphine (PF) injection 2 mg  2 mg IntraVENous Q4H PRN    cefTRIAXone (ROCEPHIN) 1,000 mg in sodium chloride 0.9 % 50 mL IVPB mini-bag  1,000 mg IntraVENous Q24H    HYDROcodone-acetaminophen (NORCO) 5-325 MG per tablet 1 tablet  1 tablet Oral Q4H PRN    diatrizoate meglumine-sodium (GASTROGRAFIN) 66-10 % solution 15 mL  15 mL Oral ONCE PRN    apixaban (ELIQUIS) tablet 5 mg  5 mg Oral BID    docusate sodium (COLACE) capsule 100 mg  100 mg Oral BID    metoclopramide (REGLAN) tablet 10 mg  10 mg Oral 4x Daily AC & HS    sodium chloride flush 0.9 % injection 5-40 mL  5-40 mL IntraVENous 2 times per day    sodium chloride flush 0.9 % injection 5-40 mL  5-40 mL IntraVENous PRN    0.9 % sodium chloride infusion   IntraVENous PRN    polyethylene glycol (GLYCOLAX) packet 17 g  17 g Oral Daily PRN    acetaminophen (TYLENOL) tablet 650 mg  650 mg Oral Q6H PRN    Or    acetaminophen (TYLENOL) suppository 650 mg  650 mg Rectal Q6H PRN    potassium chloride (KLOR-CON M) extended release tablet 40 mEq  40 mEq Oral PRN    Or    potassium bicarb-citric acid (EFFER-K) effervescent tablet 40 mEq  40 mEq Oral PRN    Or    potassium chloride 10 mEq/100 mL IVPB (Peripheral Line)  10 mEq IntraVENous PRN    magnesium sulfate 2000 mg in 50 mL IVPB premix  2,000 mg IntraVENous PRN    oxyCODONE (ROXICODONE) immediate release tablet 5 mg  5 mg Oral Q4H PRN     Ms. Claudetta Heman is a 52 y.o. female with medical history of PE on Eliquis and metastatic carcinoma of unknown primary followed by Dr. Dean Mott currently sp cycle 9 FOLFOX on 9/8/2022. She had ex-lap on 8/2022 to eval for debulking but tumor was found to be tightly adherent and impossible to debulk. She presented to ED with 1 day duration of hematemesis. Pt reported she has been feeling abdominal distention that has progressively worsened over 1 week duration associated with mild abdominal discomfort. She stated that yesterday she vomited up what looked like bloody substance however she was unsure as she has drank red Gatorade earlier in the day. Per notes she had another episode of emesis in ED however at that time was nonbloody. CT AP shows large amount of intra-abdominal ascites with mesenteric/peritoneal nodularity concerning for peritoneal carcinomatosis; bilateral hydronephrosis with ureteral stents in place; thick-walled urinary bladder; thick-walled gallbladder felt to be sympathetic and not distended. IR is consulted for paracentesis with studies ordered. Eliquis is on hold and GI is consulted. PLAN:  CUP now with large volume ascites  -sp cycle 9 FOLFOX on 9/8/2022.  - CT AP shows large amount of intra-abdominal ascites with mesenteric/peritoneal nodularity concerning for peritoneal carcinomatosis; bilateral hydronephrosis with ureteral stents in place; thick-walled urinary bladder; thick-walled gallbladder felt to be sympathetic and not distended.  -Para with studies tomorrow-EGD today  9/14 EGD negative for bleed. Para -3800 studies ordered. Advance diet. 9/15 having nausea-antiemetics  9/16 complaining of acid reflux-added one time IV Pepcid-already on PO protonix bid     History of PE  -Eliquis on hold due to hematemesis  9/14 resume Eliquis     Hematemesis / Nausea, vomiting  -PPI BID  -GI planning EGD today  -Hgb 8.9 (11.8)  9/14 hgb stable 9.2  9/17 ongoing vomiting. Emend given yesterday. Increase Zofran to 4mg q 4 hours prn and increase Ativan to 1mg q 6 hours prn. Consider marinol if no improvement. KUB neg. No evidence of bleeding. States she feels like something is stuck in her esophagus. Reconsult GI. Elevated creatinine  9/16 added fluids NS @ 75/hr  9/17 Cr down to 1.0    UTI  9/16 Noted cystitis on CT.   UA on 9/13 c/w UTI - not treated. Repeat UA (c/w UTI). Ucx ordered. Start CTX  9/17 Ucx pending. Con't CTX (D2). Continue home meds  Andres sOPs  SCDs for DVT ppx    Goals and plan of care reviewed with the patient. All questions answered to the best of our ability. Disposition:  Anticipate discharge home once sx controlled. PT recommends HH at discharge. [facility]  [address] [place] [address]  Office : [phone]  Fax : [phone]          Attending Addendum:  I have personally performed a face to face diagnostic evaluation on this patient. I have reviewed and agree with the care plan as documented by Gela Cerna, N.P. 36 minutes were spent on patient care, including but not limited to, reviewing the chart and time with the patient and family, more than 50% of the time documented was spent in face-to-face contact with the patient and in the care of the patient on the floor/unit where the patient is located. My findings are as follows: She has CUPS and UTI, appears weak, heart rate regular without murmurs, abdomen is non-tender, bowel sounds are positive, we will start Rocephin and follow-up her cultures.               Padilla Álvarez MD    [facility] Hematology/Oncology  [address] [place] [address]  Office : [phone]  Fax : [phone]

## 2022-09-17 NOTE — CONSULTS
[facility] Consult Note       Primary GI Physician: Dr. Jaime Cea Physician: Dr. Stephanie Atkins    Referring Provider:  Yves Tom NP    Consult Date:  9/17/2022    Admit Date:  9/12/2022    Chief Complaint:  Re-consult for intractable N/V and dysphagia. Subjective:     History of Present Illness:  Patient is a 52 y.o. female with PMH including but not limited to , metastatic carcinoma of unknown primary source dx 2/2022- on chemo (last dose 9/10/22), exlap 8/2022 for debulking of tumor (unsuccessful- tightly adherent) followed by Shanice Michelle on Eliquis  since 6/2022 who is seen in consultation at the request of Yves Tom NP for intractable N/V with dysphagia. Pt intitially seen in consultation this admission for hematemesis and EGD 9/13/22 showed gastric sleeve anatomy without sign of bleeding. She also had increasing malignant ascites with 3800mL fluid removed on LVP 9/13 with cytology + for malignant cells. She has since been diagnosed with UTI which is being treated with Rocephin. In the last 24 hours, pt reports intractable N/V with sensation that something is in her throat preventing passage of all PO intake. Emesis is fouls smelling and KUB neg for signs of obstruction. Normocytic anemia again noted with mild trend down of hgb to 8.7.     PMH:  Past Medical History:   Diagnosis Date    Anemia     2012-- not a problem since hyst    Colon cancer ([facility] [address] dx 2/2022     plans for chemo--- followed by dr Hubert EL-19 12/2020    no hospitalization    GERD (gastroesophageal reflux disease)     managed with med    History of colonoscopy 05/2018    Dr. Jose Jin, nl (see media note), R 2028    Hx of blood clots 06/2022    per pt \"small clot on lung identified by CT scan\"  CT Scan impression:- Nonobstructive pulmonary filling defect involving Left Lower Lobe     Hypotension     asymptomatic    Obstruction of fallopian tube     per pt has \"1 good tube\"    Peritoneal carcinomatosis ([facility] [address] Weight loss     80lbs weight loss after gastric sleeve       PSH:  Past Surgical History:   Procedure Laterality Date     SECTION  2009    CYSTOSCOPY Bilateral 2022    CYSTOSCOPY BILATERAL RETROGRADE PYELOGRAM performed by Gill Gill MD at [address] Bilateral 2022    BILATERAL CYSTOSCOPY URETERAL STENT EXCHANGE performed by Gill Gill MD at [address]  2014    gastric sleeve- Choudhari    HYSTERECTOMY (CERVIX STATUS UNKNOWN)      2018    HYSTERECTOMY (CERVIX STATUS UNKNOWN)  2020    TLH w/ Bilateral salpingectomy and left oophorectomy    IR PORT PLACEMENT EQUAL OR GREATER THAN 5 YEARS  2022    IR PORT PLACEMENT EQUAL OR GREATER THAN 5 YEARS  2022    IR PORT PLACEMENT EQUAL OR GREATER THAN 5 YEARS 2022 [facility] SPECIALS    LAPAROTOMY N/A 2022    EXPLORATORY LAPAROTOMY/ ENTEROENTEROSTOMY performed by Harmony Amos MD at [facility]. [address]  age Negra Sacks 21s\"    also \"unblocked her FT\"    TONSILLECTOMY      UPPER GASTROINTESTINAL ENDOSCOPY      with dilation    UPPER GASTROINTESTINAL ENDOSCOPY N/A 2022    EGD ESOPHAGOGASTRODUODENOSCOPY OFL 17 To IR after recovery for Para performed by Real Gomez MD at [facility] ENDOSCOPY    UROLOGICAL SURGERY Bilateral 03/15/2022    cysto       Allergies:  No Known Allergies    Home Medications:  Prior to Admission medications    Medication Sig Start Date End Date Taking? Authorizing Provider   pantoprazole (PROTONIX) 40 MG tablet Take 1 tablet by mouth in the morning and at bedtime 22  Yes SCOTT Fuentes - CNP   docusate sodium (COLACE, DULCOLAX) 100 MG CAPS Take 100 mg by mouth 2 times daily 22   SCOTT Stephens - CNP   apixaban (ELIQUIS) 5 MG TABS tablet Take 1 tablet by mouth in the morning and 1 tablet before bedtime.  22  Shavon Andrea MD   potassium chloride (KLOR-CON M) 20 MEQ extended release tablet Take 1 tablet by mouth 2 times daily 6/9/22   SCOTT Hinojosa - CNP   ergocalciferol (ERGOCALCIFEROL) 1.25 MG (59903 UT) capsule Take 50,000 Units by mouth every 7 days Takes on Thursday 10/19/21   Ar Automatic Reconciliation   lidocaine-prilocaine (EMLA) 2.5-2.5 % cream Apply to port about 45 minutes prior to access 3/4/22   Ar Automatic Reconciliation   metoclopramide (REGLAN) 10 MG tablet Take 10 mg by mouth 4 times daily (before meals and nightly) 3/31/22 6/23/22  Ar Automatic Reconciliation   ondansetron (ZOFRAN) 8 MG tablet Take 8 mg by mouth every 8 hours as needed 3/4/22   Ar Automatic Reconciliation   valACYclovir (VALTREX) 500 MG tablet Take 500 mg by mouth daily 9/1/21   Ar Automatic Reconciliation       Hospital Medications:  Current Facility-Administered Medications   Medication Dose Route Frequency    aluminum & magnesium hydroxide-simethicone (MAALOX) 200-200-20 MG/5ML suspension 30 mL  30 mL Oral Q6H PRN    LORazepam (ATIVAN) injection 1 mg  1 mg IntraVENous Q6H PRN    pantoprazole (PROTONIX) 40 mg in sodium chloride (PF) 10 mL injection  40 mg IntraVENous Q12H    ondansetron (ZOFRAN) injection 4 mg  4 mg IntraVENous Q4H PRN    0.9 % sodium chloride infusion   IntraVENous Continuous    morphine (PF) injection 2 mg  2 mg IntraVENous Q4H PRN    cefTRIAXone (ROCEPHIN) 1,000 mg in sodium chloride 0.9 % 50 mL IVPB mini-bag  1,000 mg IntraVENous Q24H    HYDROcodone-acetaminophen (NORCO) 5-325 MG per tablet 1 tablet  1 tablet Oral Q4H PRN    diatrizoate meglumine-sodium (GASTROGRAFIN) 66-10 % solution 15 mL  15 mL Oral ONCE PRN    apixaban (ELIQUIS) tablet 5 mg  5 mg Oral BID    docusate sodium (COLACE) capsule 100 mg  100 mg Oral BID    metoclopramide (REGLAN) tablet 10 mg  10 mg Oral 4x Daily AC & HS    sodium chloride flush 0.9 % injection 5-40 mL  5-40 mL IntraVENous 2 times per day    sodium chloride flush 0.9 % injection 5-40 mL  5-40 mL IntraVENous PRN    0.9 % sodium chloride infusion   IntraVENous PRN    polyethylene glycol (GLYCOLAX) packet 17 g  17 g Oral Daily PRN    acetaminophen (TYLENOL) tablet 650 mg  650 mg Oral Q6H PRN    Or    acetaminophen (TYLENOL) suppository 650 mg  650 mg Rectal Q6H PRN    potassium chloride (KLOR-CON M) extended release tablet 40 mEq  40 mEq Oral PRN    Or    potassium bicarb-citric acid (EFFER-K) effervescent tablet 40 mEq  40 mEq Oral PRN    Or    potassium chloride 10 mEq/100 mL IVPB (Peripheral Line)  10 mEq IntraVENous PRN    magnesium sulfate 2000 mg in 50 mL IVPB premix  2,000 mg IntraVENous PRN    oxyCODONE (ROXICODONE) immediate release tablet 5 mg  5 mg Oral Q4H PRN       Social History:  Social History     Tobacco Use    Smoking status: Never    Smokeless tobacco: Never   Substance Use Topics    Alcohol use: Not Currently       Pt denies any history of drug use, blood transfusions, or tattoos. Family History:  Family History   Problem Relation Age of Onset    Heart Disease Maternal Grandmother     Hypertension Maternal Grandmother     Heart Disease Maternal Grandfather     Hypertension Maternal Grandfather     Pulmonary Embolism Neg Hx     Colon Cancer Neg Hx     Deep Vein Thrombosis Neg Hx     Ovarian Cancer Neg Hx     Prostate Cancer Neg Hx     Breast Cancer Neg Hx        Review of Systems:  A detailed 10 system ROS is obtained, with pertinent positives as listed above. All others are negative. Diet:  regular,no pork    Objective:     Physical Exam:  Vitals:  BP (!) 135/90   Pulse (!) 115   Temp 98.7 °F (37.1 °C) (Oral)   Resp 18   Ht 5' 5\" (1.651 m)   Wt 112 lb (50.8 kg)   SpO2 99%   BMI 18.64 kg/m²   Gen:  Pt is alert, cooperative, appears ill, lying in bed with emesis on right shoulder  Skin:  Extremities and face reveal no rashes. HEENT: Sclerae anicteric. Extra-occular muscles are intact. No oral ulcers. No abnormal pigmentation of the lips. The neck is supple. Cardiovascular: Regular rate and rhythm. No murmurs, gallops, or rubs.   Respiratory:  Comfortable breathing with no accessory muscle use. Clear breath sounds anteriorly with no wheezes, rales, or rhonchi. GI:  Abdomen distended with Ascites but non-tender. Normal active bowel sounds. No enlargement of the liver or spleen. No masses palpable. Rectal:  Deferred  Musculoskeletal:  + edema of lower extremities bilaterally. Neurological:  Gross memory appears intact. Patient is alert and oriented. Psychiatric:  Mood appears appropriate with judgement intact. Lymphatic:  No cervical or supraclavicular adenopathy. Laboratory:    Recent Labs     09/15/22  0309 09/16/22  0300 09/17/22  0330   WBC 4.8 4.6 5.2   HGB 9.3* 9.3* 8.7*   HCT 30.4* 30.5* 28.5*    270 260   MCV 92.1 91.3 92.5    134* 138   K 4.2 4.0 3.5    99* 105   CO2 27 29 24   BUN 21 28* 30*   CREATININE 0.80 1.10* 1.00   CALCIUM 9.0 9.6 8.2*   GLUCOSE 125* 131* 107*   ALKPHOS 55 61 58   AST 6* 10* 15   ALT 7* 8* 9*   BILITOT 0.4 0.5 0.4   LABALBU 2.9* 2.9* 2.2*   PROT 6.5 6.5 5.8*     KUB 9/17/22:       CLINICAL INDICATION:  Vomiting, foul-smelling emesis, bilateral hydronephrosis   and ureteral stents. History of ascites, peritoneal carcinomatosis, unknown   primary. COMPARISON: CT 9/12/2022, radiography 3/2/2022       TECHNIQUE: AP view of the abdomen supine portable 9:22 AM       FINDINGS:  There are bilateral ureteral stents, similar in position compared   with recent CT. Generalized haziness again noted in keeping with ascites. Nonobstructive bowel gas pattern. Within limits of supine radiography no   evidence of developing free air, pneumatosis, pneumobilia, or osseous changes. Surgical suture material again projects over the stomach. There is a decreased   small amount of retained oral contrast in the GI tract. Lung bases demonstrate   no consolidation. Impression   1. No bowel obstruction evident. 2.  Ascites, bilateral ureteral stents as seen on recent CT.      Esophagogastroduodenoscopy 9/13/22:    ENDOSCOPIST: Rebecca Sher M.D. PREOPERATIVE DIAGNOSIS: hematemesis    POSTOPERATIVE DIAGNOSIS: Sussy-Zamorano tear    INSTRUMENTS: [id number]    SEDATION: MAC    DESCRIPTION: After informed consent was obtained, the patient was taken to the endoscopy suite and placed in the left lateral decubitus position. Intravenous sedation was administered by the Anesthesia provider. After adequate sedation had been achieved the endoscope was inserted over the tongue and through the posterior pharynx under direct visualization down the esophagus to the stomach and into the second portion of the duodenum. The endoscopic was withdrawn from that point, performing a careful survey of the mucosa. Retroflexion was performed in the gastric fundus. FINDINGS:  Esophagus: Tiny Sussy-Zamorano tear noted at GEJ. No bleeding     Stomach: Anatomy consistent with sleeve gastrectomy, otherwise normal     Duodenum: Normal    Estimated blood loss:  0 cc-minimal    IMPRESSION: Low risk for any significant bleeding. PLAN: Continue PPI therapy. [id number] Iris Sue for D/C from GI perspective. F/U PRN.      Karen Pate MD    Therapeutic LVP Date of Procedure: 9/14/2022     Pre-Procedure Diagnosis: ascites     Post-Procedure Diagnosis: SAME     Procedure(s): Paracentesis     Brief Description of Procedure: 3800 ml thin yellow fluid rmoved     Performed By: Lucia Contreras PA-C      Assistants: None     Anesthesia:Lidocaine     Estimated Blood Loss: None     Specimens:  Cytology- Malignant cells present     Implants:  None     Findings: large volume ascites      Complications: None     Recommendations: routine wound care      Follow Up: prn     Signed By: Lucia Contreras PA-C        Assessment:     Principal Problem:    Hematemesis  Active Problems:    Bilateral hydronephrosis    Peritoneal carcinomatosis ([address]    Current use of long term anticoagulation    Intractable vomiting    Carcinoma of unknown primary ([address]  Resolved

## 2022-09-18 ENCOUNTER — APPOINTMENT (OUTPATIENT)
Dept: GENERAL RADIOLOGY | Age: 47
DRG: 374 | End: 2022-09-18
Payer: COMMERCIAL

## 2022-09-18 LAB
ANION GAP SERPL CALC-SCNC: 9 MMOL/L (ref 4–13)
BASOPHILS # BLD: 0 K/UL (ref 0–0.2)
BASOPHILS NFR BLD: 0 % (ref 0–2)
BUN SERPL-MCNC: 23 MG/DL (ref 6–23)
CALCIUM SERPL-MCNC: 8.7 MG/DL (ref 8.3–10.4)
CHLORIDE SERPL-SCNC: 107 MMOL/L (ref 101–110)
CO2 SERPL-SCNC: 26 MMOL/L (ref 21–32)
CREAT SERPL-MCNC: 0.7 MG/DL (ref 0.6–1)
DIFFERENTIAL METHOD BLD: ABNORMAL
EOSINOPHIL # BLD: 0 K/UL (ref 0–0.8)
EOSINOPHIL NFR BLD: 0 % (ref 0.5–7.8)
ERYTHROCYTE [DISTWIDTH] IN BLOOD BY AUTOMATED COUNT: 15 % (ref 11.9–14.6)
GLUCOSE SERPL-MCNC: 124 MG/DL (ref 65–100)
HCT VFR BLD AUTO: 29.2 % (ref 35.8–46.3)
HGB BLD-MCNC: 8.9 G/DL (ref 11.7–15.4)
IMM GRANULOCYTES # BLD AUTO: 0 K/UL (ref 0–0.5)
IMM GRANULOCYTES NFR BLD AUTO: 0 % (ref 0–5)
LYMPHOCYTES # BLD: 0.6 K/UL (ref 0.5–4.6)
LYMPHOCYTES NFR BLD: 9 % (ref 13–44)
MAGNESIUM SERPL-MCNC: 2 MG/DL (ref 1.8–2.4)
MCH RBC QN AUTO: 26.8 PG (ref 26.1–32.9)
MCHC RBC AUTO-ENTMCNC: 30.5 G/DL (ref 31.4–35)
MCV RBC AUTO: 88 FL (ref 79.6–97.8)
MONOCYTES # BLD: 0.8 K/UL (ref 0.1–1.3)
MONOCYTES NFR BLD: 12 % (ref 4–12)
NEUTS SEG # BLD: 5.2 K/UL (ref 1.7–8.2)
NEUTS SEG NFR BLD: 79 % (ref 43–78)
NRBC # BLD: 0 K/UL (ref 0–0.2)
PLATELET # BLD AUTO: 297 K/UL (ref 150–450)
PMV BLD AUTO: 10.3 FL (ref 9.4–12.3)
POTASSIUM SERPL-SCNC: 3.5 MMOL/L (ref 3.5–5.1)
RBC # BLD AUTO: 3.32 M/UL (ref 4.05–5.2)
SODIUM SERPL-SCNC: 142 MMOL/L (ref 136–145)
WBC # BLD AUTO: 6.6 K/UL (ref 4.3–11.1)

## 2022-09-18 PROCEDURE — A4641 RADIOPHARM DX AGENT NOC: HCPCS | Performed by: PHYSICIAN ASSISTANT

## 2022-09-18 PROCEDURE — 74221 X-RAY XM ESOPHAGUS 2CNTRST: CPT

## 2022-09-18 PROCEDURE — 99233 SBSQ HOSP IP/OBS HIGH 50: CPT | Performed by: INTERNAL MEDICINE

## 2022-09-18 PROCEDURE — 85025 COMPLETE CBC W/AUTO DIFF WBC: CPT

## 2022-09-18 PROCEDURE — 83735 ASSAY OF MAGNESIUM: CPT

## 2022-09-18 PROCEDURE — 2580000003 HC RX 258: Performed by: NURSE PRACTITIONER

## 2022-09-18 PROCEDURE — 6370000000 HC RX 637 (ALT 250 FOR IP): Performed by: INTERNAL MEDICINE

## 2022-09-18 PROCEDURE — 6360000002 HC RX W HCPCS: Performed by: NURSE PRACTITIONER

## 2022-09-18 PROCEDURE — 2580000003 HC RX 258: Performed by: FAMILY MEDICINE

## 2022-09-18 PROCEDURE — 97535 SELF CARE MNGMENT TRAINING: CPT

## 2022-09-18 PROCEDURE — C9113 INJ PANTOPRAZOLE SODIUM, VIA: HCPCS | Performed by: NURSE PRACTITIONER

## 2022-09-18 PROCEDURE — 80048 BASIC METABOLIC PNL TOTAL CA: CPT

## 2022-09-18 PROCEDURE — 1100000000 HC RM PRIVATE

## 2022-09-18 PROCEDURE — 6370000000 HC RX 637 (ALT 250 FOR IP): Performed by: FAMILY MEDICINE

## 2022-09-18 PROCEDURE — 97165 OT EVAL LOW COMPLEX 30 MIN: CPT

## 2022-09-18 PROCEDURE — 36591 DRAW BLOOD OFF VENOUS DEVICE: CPT

## 2022-09-18 PROCEDURE — A4216 STERILE WATER/SALINE, 10 ML: HCPCS | Performed by: NURSE PRACTITIONER

## 2022-09-18 PROCEDURE — 6360000004 HC RX CONTRAST MEDICATION: Performed by: PHYSICIAN ASSISTANT

## 2022-09-18 PROCEDURE — APPSS30 APP SPLIT SHARED TIME 16-30 MINUTES: Performed by: INTERNAL MEDICINE

## 2022-09-18 RX ORDER — SUCRALFATE 1 G/1
1 TABLET ORAL EVERY 6 HOURS SCHEDULED
Status: DISCONTINUED | OUTPATIENT
Start: 2022-09-18 | End: 2022-09-27 | Stop reason: HOSPADM

## 2022-09-18 RX ADMIN — DOCUSATE SODIUM 100 MG: 100 CAPSULE, LIQUID FILLED ORAL at 20:06

## 2022-09-18 RX ADMIN — METOCLOPRAMIDE HYDROCHLORIDE 10 MG: 10 TABLET ORAL at 17:20

## 2022-09-18 RX ADMIN — OXYCODONE 5 MG: 5 TABLET ORAL at 20:07

## 2022-09-18 RX ADMIN — SODIUM CHLORIDE, PRESERVATIVE FREE 10 ML: 5 INJECTION INTRAVENOUS at 09:27

## 2022-09-18 RX ADMIN — DOCUSATE SODIUM 100 MG: 100 CAPSULE, LIQUID FILLED ORAL at 09:24

## 2022-09-18 RX ADMIN — ONDANSETRON 4 MG: 2 INJECTION INTRAMUSCULAR; INTRAVENOUS at 20:06

## 2022-09-18 RX ADMIN — CEFTRIAXONE 1000 MG: 1 INJECTION, POWDER, FOR SOLUTION INTRAMUSCULAR; INTRAVENOUS at 20:04

## 2022-09-18 RX ADMIN — Medication 1 MG: at 21:49

## 2022-09-18 RX ADMIN — Medication 1 MG: at 13:08

## 2022-09-18 RX ADMIN — SUCRALFATE 1 G: 1 TABLET ORAL at 17:20

## 2022-09-18 RX ADMIN — APIXABAN 5 MG: 5 TABLET, FILM COATED ORAL at 09:24

## 2022-09-18 RX ADMIN — METOCLOPRAMIDE HYDROCHLORIDE 10 MG: 10 TABLET ORAL at 20:06

## 2022-09-18 RX ADMIN — SODIUM CHLORIDE, PRESERVATIVE FREE 40 MG: 5 INJECTION INTRAVENOUS at 09:24

## 2022-09-18 RX ADMIN — SODIUM CHLORIDE, PRESERVATIVE FREE 10 ML: 5 INJECTION INTRAVENOUS at 20:07

## 2022-09-18 RX ADMIN — SODIUM CHLORIDE: 9 INJECTION, SOLUTION INTRAVENOUS at 13:08

## 2022-09-18 RX ADMIN — METOCLOPRAMIDE HYDROCHLORIDE 10 MG: 10 TABLET ORAL at 09:24

## 2022-09-18 RX ADMIN — SUCRALFATE 1 G: 1 TABLET ORAL at 13:08

## 2022-09-18 RX ADMIN — APIXABAN 5 MG: 5 TABLET, FILM COATED ORAL at 20:06

## 2022-09-18 RX ADMIN — BARIUM SULFATE 60 ML: 0.6 SUSPENSION ORAL at 11:54

## 2022-09-18 RX ADMIN — SODIUM CHLORIDE, PRESERVATIVE FREE 40 MG: 5 INJECTION INTRAVENOUS at 20:06

## 2022-09-18 RX ADMIN — ONDANSETRON 4 MG: 2 INJECTION INTRAMUSCULAR; INTRAVENOUS at 09:30

## 2022-09-18 RX ADMIN — ONDANSETRON 4 MG: 2 INJECTION INTRAMUSCULAR; INTRAVENOUS at 04:08

## 2022-09-18 ASSESSMENT — PAIN SCALES - GENERAL
PAINLEVEL_OUTOF10: 0
PAINLEVEL_OUTOF10: 0
PAINLEVEL_OUTOF10: 6

## 2022-09-18 NOTE — PROGRESS NOTES
[facility] Hematology & Oncology        Inpatient Hematology / Oncology Progress Note      Admission Date: 2022  5:06 PM  Reason for Admission/Hospital Course: Hematemesis [K92.0]  Generalized abdominal pain [R10.84]  Intractable vomiting with nausea, unspecified vomiting type [R11.2]      24 Hour Events:  Afebrile, VSS  UA c/w UTI, on CTX - completes today  Ongoing vomiting  Barium study pending, GI following  Visitor at bedside    Transfusions: None  Replacements: None    ROS:  Constitutional: negative for fever, chills. +weakness, malaise fatigue. CV: negative for chest pain, palpitations, edema. Respiratory: negative for dyspnea, cough, wheezing. GI: +nausea/vomiting. negative for abdominal pain, diarrhea. 10 point review of systems is otherwise negative with the exception of the elements mentioned above in the HPI. No Known Allergies    OBJECTIVE:  Patient Vitals for the past 8 hrs:   BP Temp Temp src Pulse Resp SpO2   22 0719 109/78 98.2 °F (36.8 °C) Oral 98 18 98 %   22 0315 (!) 127/95 98.1 °F (36.7 °C) Oral (!) 103 20 97 %     Temp (24hrs), Av.4 °F (36.9 °C), Min:98.1 °F (36.7 °C), Max:98.7 °F (37.1 °C)    No intake/output data recorded. Physical Exam:  Constitutional: Well developed, thin appearing female in no acute distress, sitting comfortably in the hospital bed. HEENT: Normocephalic and atraumatic. Oropharynx is clear, mucous membranes are moist.  Extraocular muscles are intact. Sclerae anicteric. Skin Warm and dry. No bruising and no rash noted. No erythema. No pallor. Respiratory Lungs are clear to auscultation bilaterally without wheezes, rales or rhonchi, normal air exchange without accessory muscle use. CVS Normal rate, regular rhythm and normal S1 and S2. No murmurs, gallops, or rubs. Abdomen Soft, mildly tender and distended, normoactive bowel sounds. No palpable mass. No hepatosplenomegaly.    Neuro Grossly nonfocal with no obvious sensory or motor deficits. MSK Normal range of motion in general.  No edema and no tenderness. Psych Appropriate mood and affect. Labs:      Recent Labs     09/16/22 0300 09/17/22 0330 09/18/22  0357   WBC 4.6 5.2 6.6   RBC 3.34* 3.08* 3.32*   HGB 9.3* 8.7* 8.9*   HCT 30.5* 28.5* 29.2*   MCV 91.3 92.5 88.0   MCH 27.8 28.2 26.8   MCHC 30.5* 30.5* 30.5*   RDW 14.3 14.9* 15.0*    260 297   MPV 10.4 10.6 10.3        Recent Labs     09/16/22 0300 09/17/22 0330 09/18/22 0357   * 138 142   K 4.0 3.5 3.5   CL 99* 105 107   CO2 29 24 26   BUN 28* 30* 23   GFRAA >60 >60 >60   GLOB 3.6* 3.6*  --    MG  --   --  2.0         Imaging:  Xray Result (most recent):  XR ABDOMEN (KUB) (SINGLE AP VIEW) 09/17/2022    Narrative  KUB    CLINICAL INDICATION:  Vomiting, foul-smelling emesis, bilateral hydronephrosis  and ureteral stents. History of ascites, peritoneal carcinomatosis, unknown  primary. COMPARISON: CT 9/12/2022, radiography 3/2/2022    TECHNIQUE: AP view of the abdomen supine portable 9:22 AM    FINDINGS:  There are bilateral ureteral stents, similar in position compared  with recent CT. Generalized haziness again noted in keeping with ascites. Nonobstructive bowel gas pattern. Within limits of supine radiography no  evidence of developing free air, pneumatosis, pneumobilia, or osseous changes. Surgical suture material again projects over the stomach. There is a decreased  small amount of retained oral contrast in the GI tract. Lung bases demonstrate  no consolidation. Impression  1. No bowel obstruction evident. 2.  Ascites, bilateral ureteral stents as seen on recent CT.     CT Result (most recent):  CT ABDOMEN PELVIS W IV CONTRAST 09/12/2022    Narrative  EXAMINATION: CT  ABDOMEN / PELVIS   9/12/2022 8:06 PM    ACCESSION NUMBER:  [id number]    COMPARISON: 06/22/2022    INDICATION:  abdominal pain    TECHNIQUE: Contiguous axial computed tomographic images were obtained from the  domes of the diaphragm to the symphysis pubis following administration 100 mL  Iso-behzad 370. Coronal reconstructions were also performed. Oral contrast was also  administered. Radiation dose reduction techniques were used for this study. Our CT scanners  use one or all of the following: Automated exposure control, adjustment of the  mA and/or kV according to patient size, iterative reconstruction. FINDINGS:    Lung bases: The visualized lung bases are clear. The visualized portions of the  heart are unremarkable. Liver: Normal    Gallbladder: There is gallbladder wall thickening. The gallbladder is not  distended. Spleen: Normal    Adrenals: Normal    Pancreas: Normal    Kidneys: The kidneys enhance symmetrically. There is moderate to severe  bilateral hydronephrosis with ureteral stents in place. Bladder/: The urinary bladder is somewhat thick-walled. There is previous  hysterectomy. .    Bowel: There is large volume ascites. There is some peritoneal enhancement. There is not peritoneal nodularity. There is some nodularity within the omentum  and mesentery. There is previous gastric sleeve type procedure. The appendix is  normal.    Vessels: Normal    Abdominal wall: Intact    Bones: No suspicious lytic or blastic bony lesions. Impression  Large amount of intraabdominal ascites with mesenteric and peritoneal nodularity  as well as peritoneal enhancement is most concerning for peritoneal  carcinomatosis. Bilateral hydronephrosis with ureteral stents in place. Thick-walled urinary bladder. Correlate with evidence of cystitis. Thick-walled gallbladder is felt likely to be sympathetic. The gallbladder is  not distended.       Medications:  Current Facility-Administered Medications   Medication Dose Route Frequency    sucralfate (CARAFATE) tablet 1 g  1 g Oral 4 times per day    aluminum & magnesium hydroxide-simethicone (MAALOX) 200-200-20 MG/5ML suspension 30 mL  30 mL Oral Q6H PRN LORazepam (ATIVAN) injection 1 mg  1 mg IntraVENous Q6H PRN    pantoprazole (PROTONIX) 40 mg in sodium chloride (PF) 10 mL injection  40 mg IntraVENous Q12H    ondansetron (ZOFRAN) injection 4 mg  4 mg IntraVENous Q4H PRN    promethazine (PHENERGAN) injection 6.25 mg  6.25 mg IntraMUSCular Q6H PRN    0.9 % sodium chloride infusion   IntraVENous Continuous    morphine (PF) injection 2 mg  2 mg IntraVENous Q4H PRN    cefTRIAXone (ROCEPHIN) 1,000 mg in sodium chloride 0.9 % 50 mL IVPB mini-bag  1,000 mg IntraVENous Q24H    HYDROcodone-acetaminophen (NORCO) 5-325 MG per tablet 1 tablet  1 tablet Oral Q4H PRN    diatrizoate meglumine-sodium (GASTROGRAFIN) 66-10 % solution 15 mL  15 mL Oral ONCE PRN    apixaban (ELIQUIS) tablet 5 mg  5 mg Oral BID    docusate sodium (COLACE) capsule 100 mg  100 mg Oral BID    metoclopramide (REGLAN) tablet 10 mg  10 mg Oral 4x Daily AC & HS    sodium chloride flush 0.9 % injection 5-40 mL  5-40 mL IntraVENous 2 times per day    sodium chloride flush 0.9 % injection 5-40 mL  5-40 mL IntraVENous PRN    0.9 % sodium chloride infusion   IntraVENous PRN    polyethylene glycol (GLYCOLAX) packet 17 g  17 g Oral Daily PRN    acetaminophen (TYLENOL) tablet 650 mg  650 mg Oral Q6H PRN    Or    acetaminophen (TYLENOL) suppository 650 mg  650 mg Rectal Q6H PRN    potassium chloride (KLOR-CON M) extended release tablet 40 mEq  40 mEq Oral PRN    Or    potassium bicarb-citric acid (EFFER-K) effervescent tablet 40 mEq  40 mEq Oral PRN    Or    potassium chloride 10 mEq/100 mL IVPB (Peripheral Line)  10 mEq IntraVENous PRN    magnesium sulfate 2000 mg in 50 mL IVPB premix  2,000 mg IntraVENous PRN    oxyCODONE (ROXICODONE) immediate release tablet 5 mg  5 mg Oral Q4H PRN     Ms. Padmini Boss is a 52 y.o. female with medical history of PE on Eliquis and metastatic carcinoma of unknown primary followed by Dr. Mingo Andrews currently sp cycle 9 FOLFOX on 9/8/2022.  She had ex-lap on 8/2022 to eval for debulking but tumor was found to be tightly adherent and impossible to debulk. She presented to ED with 1 day duration of hematemesis. Pt reported she has been feeling abdominal distention that has progressively worsened over 1 week duration associated with mild abdominal discomfort. She stated that yesterday she vomited up what looked like bloody substance however she was unsure as she has drank red Gatorade earlier in the day. Per notes she had another episode of emesis in ED however at that time was nonbloody. CT AP shows large amount of intra-abdominal ascites with mesenteric/peritoneal nodularity concerning for peritoneal carcinomatosis; bilateral hydronephrosis with ureteral stents in place; thick-walled urinary bladder; thick-walled gallbladder felt to be sympathetic and not distended. IR is consulted for paracentesis with studies ordered. Eliquis is on hold and GI is consulted. PLAN:  CUP now with large volume ascites  -sp cycle 9 FOLFOX on 9/8/2022.  - CT AP shows large amount of intra-abdominal ascites with mesenteric/peritoneal nodularity concerning for peritoneal carcinomatosis; bilateral hydronephrosis with ureteral stents in place; thick-walled urinary bladder; thick-walled gallbladder felt to be sympathetic and not distended.  -Para with studies tomorrow-EGD today  9/14 EGD negative for bleed. Para -3800 studies ordered. Advance diet. 9/15 having nausea-antiemetics  9/16 complaining of acid reflux-added one time IV Pepcid-already on PO protonix bid     History of PE  -Eliquis on hold due to hematemesis  9/14 resume Eliquis     Hematemesis / Nausea, vomiting  -PPI BID  -GI planning EGD today  -Hgb 8.9 (11.8)  9/14 hgb stable 9.2  9/17 ongoing vomiting. Emend given yesterday. Increase Zofran to 4mg q 4 hours prn and increase Ativan to 1mg q 6 hours prn. Consider marinol if no improvement. KUB neg. No evidence of bleeding. States she feels like something is stuck in her esophagus.   Reconsult GI.  9/18 GI added prn phenergan. Awaiting barium study. GI following. Elevated creatinine  9/16 added fluids NS @ 75/hr  9/18 Cr down to 0.7  RESOLVED    UTI  9/16 Noted cystitis on CT. UA on 9/13 c/w UTI - not treated. Repeat UA (c/w UTI). Ucx ordered. Start CTX  9/17 Ucx pending. Con't CTX (D2).  9/18 Ucx-NGTD. Completes CTX today. Continue home meds  Andres sOPs  SCDs for DVT ppx    Goals and plan of care reviewed with the patient. All questions answered to the best of our ability. Disposition:  Anticipate discharge home once sx controlled. PT recommends HH at discharge. SCOTT Mckeon  Hematology & Oncology  [address] [place] [address]  Office : [phone]  Fax : [phone]            Attending Addendum:  I have personally performed a face to face diagnostic evaluation on this patient. I have reviewed and agree with the care plan as documented by Manoj Ayala, N.P. 36 minutes were spent on patient care, including but not limited to, reviewing the chart and time with the patient and family, more than 50% of the time documented was spent in face-to-face contact with the patient and in the care of the patient on the floor/unit where the patient is located. My findings are as follows: She has CUPS and UTI, appears WEAK, heart rate regular without murmurs, abdomen is non-tender, bowel sounds are positive, we will continue Rocephin and arrange for her to undergo a Barium swallow.               Mateo Chakraborty MD    [facility] Hematology/Oncology  [address] [place] [address]  Office : [phone]  Fax : [phone]

## 2022-09-18 NOTE — PROGRESS NOTES
OCCUPATIONAL THERAPY Initial Assessment and Daily Note       OT Visit Days: 1  Acknowledge Orders  Time  OT Charge Capture  Rehab Caseload Tracker      Melanie Borges is a 52 y.o. female   PRIMARY DIAGNOSIS: Hematemesis  Hematemesis [K92.0]  Generalized abdominal pain [R10.84]  Intractable vomiting with nausea, unspecified vomiting type [R11.2]  Procedure(s) (LRB):  EGD ESOPHAGOGASTRODUODENOSCOPY OFL 17 To IR after recovery for Para (N/A)  5 Days Post-Op  Reason for Referral: Generalized Muscle Weakness (M62.81)  Other lack of cordination (R27.8)  Difficulty in walking, Not elsewhere classified (R26.2)  Inpatient: Payor: Kalli Berrios 150 / Plan: [employer] / Product Type: *No Product type* /     ASSESSMENT:     REHAB RECOMMENDATIONS:   Recommendation to date pending progress:  Setting:  Home Health Therapy    Equipment:    To Be Determined     ASSESSMENT:  Ms. [facility] presented to the hospital with hematemesis. Pt with a PMH significant for metastatic carcinoma of unknown primary and is currently undergoing treatment At baseline, pt is independent for ADLs and IADLs. Does not use DME. Today, pt was received supine in bed. Completed bed mobility, functional transfers, ambulation with HHA, LB/UB dressing, and toileting with overall CGA. Pt weak and fatigues very quickly--poor activity tolerance. Anticipate pt will be safe [facility] Melanie Borges currently demonstrates overall deficits in strength, balance, activity tolerance, and ADL performance. Patient would benefit from skilled OT services at this time in order to address functional deficits and OT goals stated above.       [address] [facility] [address] [id number] AM-PAC 6 Clicks Daily Activity Inpatient Short Form:    AM-PAC Daily Activity Inpatient   How much help for putting on and taking off regular lower body clothing?: A Little  How much help for Bathing?: A Little  How much help for Toileting?: A Little  How much help for putting on and taking off regular upper body clothing?: A Little  How much help for taking care of personal grooming?: A Little  How much help for eating meals?: None  AM-PAC Inpatient Daily Activity Raw Score: 19  AM-PAC Inpatient ADL T-Scale Score : 40.22  ADL Inpatient CMS 0-100% Score: 42.8  ADL Inpatient CMS G-Code Modifier : CK     SUBJECTIVE:     Ms. Walker states, \"I dont feel good. \"     Social/Functional      OBJECTIVE:     Carolyn Pemberton / Aline Lima / Jennifer Hunter: NA    RESTRICTIONS/PRECAUTIONS:       PAIN: VITALS / O2:   Pre Treatment:   Pain Assessment: None - Denies Pain--reported nausea       Post Treatment: no change        Vitals          Oxygen            GROSS EVALUATION: INTACT IMPAIRED   (See Comments)   UE AROM [x] []   UE PROM [x] []   Strength []  Generalized weakness BUEs, functional for bADLs     Posture / Balance []  Fair+ sitting and standing    Sensation [x]     Coordination []       Tone [x]       Edema [x]    Activity Tolerance []  Frequent seated rest breaks required, extended time for tasks     Hand Dominance R [] L []      COGNITION/  PERCEPTION: INTACT IMPAIRED   (See Comments)   Orientation [x]     Vision [x]     Hearing [x]     Cognition  [x]     Perception [x]       MOBILITY: I Mod I S SBA CGA Min Mod Max Total  NT x2 Comments:   Bed Mobility    Rolling [] [] [] [] [] [] [] [] [] [x] []    Supine to Sit [] [] [] [] [x] [] [] [] [] [] []    Scooting [] [] [] [] [x] [] [] [] [] [] []    Sit to Supine [] [] [] [] [] [] [] [] [] [x] [] Left with transport staff   Transfers    Sit to Stand [] [] [] [] [x] [] [] [] [] [] []    Bed to Chair [] [] [] [] [] [] [] [] [] [x] [] CGA transfer to transport stretcher    Stand to Sit [] [] [] [] [x] [] [] [] [] [] []    Tub/Shower [] [] [] [] [] [] [] [] [] [x] []     Toilet [] [] [] [] [] [] [] [] [] [x] []      [] [] [] [] [] [] [] [] [] [] []    I=Independent, Mod I=Modified Independent, S=Supervision/Setup, SBA=Standby Assistance, CGA=Contact Guard Assistance, Min=Minimal Assistance, Mod=Moderate Assistance, Max=Maximal Assistance, Total=Total Assistance, NT=Not Tested    ACTIVITIES OF DAILY LIVING: I Mod I S SBA CGA Min Mod Max Total NT Comments   BASIC ADLs:              Upper Body Bathing  [] [] [] [] [] [] [] [] [] [x]    Lower Body Bathing [] [] [] [] [] [] [] [] [] [x]    Toileting [] [] [] [] [x] [] [] [] [] [] Brief change and krish-care in standing    Upper Body Dressing [] [] [] [] [x] [] [] [] [] [] Gown    Lower Body Dressing [] [] [] [] [x] [] [] [] [] [] socks   Feeding [] [] [] [] [] [] [] [] [] [x]    Grooming [] [] [] [] [x] [] [] [] [] [] Washed face sitting EOB   Personal Device Care [] [] [] [] [] [] [] [] [] [x]    Functional Mobility [] [] [] [] [x] [] [] [] [] [] HHA   I=Independent, Mod I=Modified Independent, S=Supervision/Setup, SBA=Standby Assistance, CGA=Contact Guard Assistance, Min=Minimal Assistance, Mod=Moderate Assistance, Max=Maximal Assistance, Total=Total Assistance, NT=Not Tested    PLAN:     FREQUENCY/DURATION   OT Plan of Care: 3 times/week for duration of hospital stay or until stated goals are met, whichever comes first.    ACUTE OCCUPATIONAL THERAPY GOALS:   (Developed with and agreed upon by patient and/or caregiver.)  1. Patient will complete lower body bathing and dressing with MOD I and adaptive equipment as needed. 2.Patient will complete upper body bathing and dressing with MOD I and adaptive equipment as needed. 3. Patient will complete toileting with MOD I.   4. Patient will tolerate 30 minutes of OT treatment with 1-2 rest breaks to increase activity tolerance for ADLs. 5. Patient will complete functional transfers with MOD I and adaptive equipment as needed. 6. Patient will complete functional activity with MOD I and adaptive equipment as needed.     Timeframe: 7 visits        PROBLEM LIST:   (Skilled intervention is medically necessary to address:)  Decreased ADL/Functional Activities  Decreased Activity Tolerance  Decreased Balance  Decreased Coordination  Decreased Safety Awareness  Decreased Strength  Decreased Transfer Abilities   INTERVENTIONS PLANNED:  (Benefits and precautions of occupational therapy have been discussed with the patient.)  Self Care Training  Therapeutic Activity  Therapeutic Exercise/HEP  Neuromuscular Re-education  Manual Therapy  Education         TREATMENT:     EVALUATION: LOW COMPLEXITY: (Untimed Charge)    TREATMENT:   Self Care (10 minutes): Patient participated in toileting, upper body dressing, lower body dressing, and grooming ADLs in unsupported sitting and standing with no verbal cueing to increase independence, decrease assistance required, increase activity tolerance, and increase safety awareness. Patient also participated in functional mobility, functional transfer, and energy conservation training to increase independence, decrease assistance required, increase activity tolerance, and increase safety awareness. TREATMENT GRID:  N/A    AFTER TREATMENT PRECAUTIONS:  Left with transport staff--all needs met    INTERDISCIPLINARY COLLABORATION:  RN/ PCT and OT/ DURAN    EDUCATION:  Education Given To: Patient  Education Provided: Role of Therapy;Plan of Care; Fall Prevention Strategies  Education Outcome: Verbalized understanding;Demonstrated understanding    TOTAL TREATMENT DURATION AND TIME:  Time In: 1050  Time Out: 1110  Minutes: 12 Johan Woody OT

## 2022-09-18 NOTE — PROGRESS NOTES
END OF SHIFT SUMMARY:    Additional events this shift:   -Pt got zofran 2x  -Pt had no complaints of pain  -Pt was able to swallow pills with water  -Pt rested most of the nights, no complaints of pain at this time    Fawn Gibson RN

## 2022-09-18 NOTE — PROGRESS NOTES
Pt still throwing up periodically today despite IV zofran x2. Pt took only eliquis and reglan crushed in apple sauce. Pt unable to keep any food or fluids down. Attempted to send pt down for barium swallow, but pt was still throwing up after IM phenergan. Spoke to MD and IR and agreed to try again tomorrow or Monday. Informed oncoming nurse about situation and to have day nurse communicate with Edison Chisholm in IR tomorrow to coordinate nausea pre meds prior to procedure. Pt was given IV ativan after phenergan and finally fell asleep. VS slightly elevated, but stable. Pt has denies abdominal pain.

## 2022-09-18 NOTE — PROGRESS NOTES
[facility] Progress Note         Admit Date:  9/12/2022    Today's Date:  9/18/2022    CC:  Dysphagia    Subjective:     Patient feels some better. Handling saliva and ice. Wants to drink liquids but also worried she will vomit these. Barium esophagram had to be deferred yesterday due to vomiting.     Medications:   Current Facility-Administered Medications   Medication Dose Route Frequency    aluminum & magnesium hydroxide-simethicone (MAALOX) 200-200-20 MG/5ML suspension 30 mL  30 mL Oral Q6H PRN    LORazepam (ATIVAN) injection 1 mg  1 mg IntraVENous Q6H PRN    pantoprazole (PROTONIX) 40 mg in sodium chloride (PF) 10 mL injection  40 mg IntraVENous Q12H    ondansetron (ZOFRAN) injection 4 mg  4 mg IntraVENous Q4H PRN    promethazine (PHENERGAN) injection 6.25 mg  6.25 mg IntraMUSCular Q6H PRN    0.9 % sodium chloride infusion   IntraVENous Continuous    morphine (PF) injection 2 mg  2 mg IntraVENous Q4H PRN    cefTRIAXone (ROCEPHIN) 1,000 mg in sodium chloride 0.9 % 50 mL IVPB mini-bag  1,000 mg IntraVENous Q24H    HYDROcodone-acetaminophen (NORCO) 5-325 MG per tablet 1 tablet  1 tablet Oral Q4H PRN    diatrizoate meglumine-sodium (GASTROGRAFIN) 66-10 % solution 15 mL  15 mL Oral ONCE PRN    apixaban (ELIQUIS) tablet 5 mg  5 mg Oral BID    docusate sodium (COLACE) capsule 100 mg  100 mg Oral BID    metoclopramide (REGLAN) tablet 10 mg  10 mg Oral 4x Daily AC & HS    sodium chloride flush 0.9 % injection 5-40 mL  5-40 mL IntraVENous 2 times per day    sodium chloride flush 0.9 % injection 5-40 mL  5-40 mL IntraVENous PRN    0.9 % sodium chloride infusion   IntraVENous PRN    polyethylene glycol (GLYCOLAX) packet 17 g  17 g Oral Daily PRN    acetaminophen (TYLENOL) tablet 650 mg  650 mg Oral Q6H PRN    Or    acetaminophen (TYLENOL) suppository 650 mg  650 mg Rectal Q6H PRN    potassium chloride (KLOR-CON M) extended release tablet 40 mEq  40 mEq Oral PRN    Or    potassium bicarb-citric acid (EFFER-K) effervescent tablet 40 mEq  40 mEq Oral PRN    Or    potassium chloride 10 mEq/100 mL IVPB (Peripheral Line)  10 mEq IntraVENous PRN    magnesium sulfate 2000 mg in 50 mL IVPB premix  2,000 mg IntraVENous PRN    oxyCODONE (ROXICODONE) immediate release tablet 5 mg  5 mg Oral Q4H PRN       Review of Systems:  ROS was obtained, with pertinent positives as listed above. No chest pain or SOB. Objective:   Vitals:  /78   Pulse 98   Temp 98.2 °F (36.8 °C) (Oral)   Resp 18   Ht 5' 5\" (1.651 m)   Wt 112 lb (50.8 kg)   SpO2 98%   BMI 18.64 kg/m²   Intake/Output:  No intake/output data recorded. 09/16 1901 - 09/18 0700  In: 855 [I.V.:855]  Out: 980 [Urine:700]  Exam:  General appearance: alert, cooperative, no distress  Lungs: clear to auscultation bilaterally anteriorly  Heart: regular rate and rhythm  Abdomen: soft, non-tender, distended with ascites. Bowel sounds normal. Extremities: extremities normal, atraumatic, no cyanosis or edema  Neuro:  alert and oriented    Data Review (Labs):    Recent Labs     09/16/22  0300 09/17/22  0330 09/18/22  0357   WBC 4.6 5.2 6.6   HGB 9.3* 8.7* 8.9*   HCT 30.5* 28.5* 29.2*    260 297   MCV 91.3 92.5 88.0   * 138 142   K 4.0 3.5 3.5   CL 99* 105 107   CO2 29 24 26   BUN 28* 30* 23   CREATININE 1.10* 1.00 0.70   CALCIUM 9.6 8.2* 8.7   MG  --   --  2.0   GLUCOSE 131* 107* 124*   ALKPHOS 61 58  --    AST 10* 15  --    ALT 8* 9*  --    BILITOT 0.5 0.4  --    LABALBU 2.9* 2.2*  --    PROT 6.5 5.8*  --        Assessment:     Principal Problem:    Hematemesis  Active Problems:    Bilateral hydronephrosis    Peritoneal carcinomatosis (HCC)    Current use of long term anticoagulation    Intractable vomiting    Carcinoma of unknown primary ([facility] [address]  Resolved Problems:    * No resolved hospital problems. *    Suspect esophagitis resulting from repeated vomiting. Plan:      Add Carafate slurry    Awaiting barium esophagram

## 2022-09-19 PROBLEM — R10.84 GENERALIZED ABDOMINAL PAIN: Status: ACTIVE | Noted: 2022-09-19

## 2022-09-19 LAB
ALBUMIN SERPL-MCNC: 2.1 G/DL (ref 3.5–5)
ALBUMIN/GLOB SERPL: 0.6 {RATIO} (ref 1.2–3.5)
ALP SERPL-CCNC: 134 U/L (ref 50–136)
ALT SERPL-CCNC: 27 U/L (ref 12–65)
ANION GAP SERPL CALC-SCNC: 4 MMOL/L (ref 4–13)
AST SERPL-CCNC: 42 U/L (ref 15–37)
BACTERIA SPEC CULT: NORMAL
BASOPHILS # BLD: 0 K/UL (ref 0–0.2)
BASOPHILS NFR BLD: 0 % (ref 0–2)
BILIRUB SERPL-MCNC: 0.3 MG/DL (ref 0.2–1.1)
BUN SERPL-MCNC: 20 MG/DL (ref 6–23)
CALCIUM SERPL-MCNC: 7.9 MG/DL (ref 8.3–10.4)
CHLORIDE SERPL-SCNC: 112 MMOL/L (ref 101–110)
CO2 SERPL-SCNC: 26 MMOL/L (ref 21–32)
CREAT SERPL-MCNC: 0.6 MG/DL (ref 0.6–1)
DIFFERENTIAL METHOD BLD: ABNORMAL
EOSINOPHIL # BLD: 0 K/UL (ref 0–0.8)
EOSINOPHIL NFR BLD: 0 % (ref 0.5–7.8)
ERYTHROCYTE [DISTWIDTH] IN BLOOD BY AUTOMATED COUNT: 15.6 % (ref 11.9–14.6)
GLOBULIN SER CALC-MCNC: 3.3 G/DL (ref 2.3–3.5)
GLUCOSE SERPL-MCNC: 120 MG/DL (ref 65–100)
HCT VFR BLD AUTO: 25.1 % (ref 35.8–46.3)
HGB BLD-MCNC: 8 G/DL (ref 11.7–15.4)
IMM GRANULOCYTES # BLD AUTO: 0 K/UL (ref 0–0.5)
IMM GRANULOCYTES NFR BLD AUTO: 1 % (ref 0–5)
LYMPHOCYTES # BLD: 0.6 K/UL (ref 0.5–4.6)
LYMPHOCYTES NFR BLD: 7 % (ref 13–44)
MAGNESIUM SERPL-MCNC: 2 MG/DL (ref 1.8–2.4)
MCH RBC QN AUTO: 28.4 PG (ref 26.1–32.9)
MCHC RBC AUTO-ENTMCNC: 31.9 G/DL (ref 31.4–35)
MCV RBC AUTO: 89 FL (ref 79.6–97.8)
MONOCYTES # BLD: 0.9 K/UL (ref 0.1–1.3)
MONOCYTES NFR BLD: 11 % (ref 4–12)
NEUTS SEG # BLD: 6.4 K/UL (ref 1.7–8.2)
NEUTS SEG NFR BLD: 81 % (ref 43–78)
NRBC # BLD: 0 K/UL (ref 0–0.2)
PLATELET # BLD AUTO: 232 K/UL (ref 150–450)
PMV BLD AUTO: 10.5 FL (ref 9.4–12.3)
POTASSIUM SERPL-SCNC: 3.3 MMOL/L (ref 3.5–5.1)
PROT SERPL-MCNC: 5.4 G/DL (ref 6.3–8.2)
RBC # BLD AUTO: 2.82 M/UL (ref 4.05–5.2)
SERVICE CMNT-IMP: NORMAL
SODIUM SERPL-SCNC: 142 MMOL/L (ref 136–145)
WBC # BLD AUTO: 7.8 K/UL (ref 4.3–11.1)

## 2022-09-19 PROCEDURE — 2580000003 HC RX 258: Performed by: FAMILY MEDICINE

## 2022-09-19 PROCEDURE — 6370000000 HC RX 637 (ALT 250 FOR IP): Performed by: FAMILY MEDICINE

## 2022-09-19 PROCEDURE — 2580000003 HC RX 258: Performed by: NURSE PRACTITIONER

## 2022-09-19 PROCEDURE — 85025 COMPLETE CBC W/AUTO DIFF WBC: CPT

## 2022-09-19 PROCEDURE — 6360000002 HC RX W HCPCS: Performed by: INTERNAL MEDICINE

## 2022-09-19 PROCEDURE — A4216 STERILE WATER/SALINE, 10 ML: HCPCS | Performed by: NURSE PRACTITIONER

## 2022-09-19 PROCEDURE — 80053 COMPREHEN METABOLIC PANEL: CPT

## 2022-09-19 PROCEDURE — 6360000002 HC RX W HCPCS: Performed by: NURSE PRACTITIONER

## 2022-09-19 PROCEDURE — 6370000000 HC RX 637 (ALT 250 FOR IP): Performed by: INTERNAL MEDICINE

## 2022-09-19 PROCEDURE — C9113 INJ PANTOPRAZOLE SODIUM, VIA: HCPCS | Performed by: NURSE PRACTITIONER

## 2022-09-19 PROCEDURE — 1100000000 HC RM PRIVATE

## 2022-09-19 PROCEDURE — 97530 THERAPEUTIC ACTIVITIES: CPT

## 2022-09-19 PROCEDURE — 83735 ASSAY OF MAGNESIUM: CPT

## 2022-09-19 PROCEDURE — 99233 SBSQ HOSP IP/OBS HIGH 50: CPT | Performed by: INTERNAL MEDICINE

## 2022-09-19 RX ORDER — POTASSIUM CHLORIDE 29.8 MG/ML
20 INJECTION INTRAVENOUS
Status: COMPLETED | OUTPATIENT
Start: 2022-09-19 | End: 2022-09-19

## 2022-09-19 RX ADMIN — SODIUM CHLORIDE, PRESERVATIVE FREE 40 MG: 5 INJECTION INTRAVENOUS at 21:34

## 2022-09-19 RX ADMIN — APIXABAN 5 MG: 5 TABLET, FILM COATED ORAL at 21:34

## 2022-09-19 RX ADMIN — SODIUM CHLORIDE, PRESERVATIVE FREE 10 ML: 5 INJECTION INTRAVENOUS at 08:07

## 2022-09-19 RX ADMIN — OXYCODONE 5 MG: 5 TABLET ORAL at 18:17

## 2022-09-19 RX ADMIN — METOCLOPRAMIDE HYDROCHLORIDE 10 MG: 10 TABLET ORAL at 21:34

## 2022-09-19 RX ADMIN — METOCLOPRAMIDE HYDROCHLORIDE 10 MG: 10 TABLET ORAL at 10:44

## 2022-09-19 RX ADMIN — SUCRALFATE 1 G: 1 TABLET ORAL at 17:29

## 2022-09-19 RX ADMIN — SODIUM CHLORIDE, PRESERVATIVE FREE 10 ML: 5 INJECTION INTRAVENOUS at 20:35

## 2022-09-19 RX ADMIN — SUCRALFATE 1 G: 1 TABLET ORAL at 11:21

## 2022-09-19 RX ADMIN — OXYCODONE 5 MG: 5 TABLET ORAL at 03:14

## 2022-09-19 RX ADMIN — ONDANSETRON 4 MG: 2 INJECTION INTRAMUSCULAR; INTRAVENOUS at 03:14

## 2022-09-19 RX ADMIN — POTASSIUM CHLORIDE 20 MEQ: 29.8 INJECTION, SOLUTION INTRAVENOUS at 07:56

## 2022-09-19 RX ADMIN — SUCRALFATE 1 G: 1 TABLET ORAL at 06:18

## 2022-09-19 RX ADMIN — SUCRALFATE 1 G: 1 TABLET ORAL at 01:01

## 2022-09-19 RX ADMIN — METOCLOPRAMIDE HYDROCHLORIDE 10 MG: 10 TABLET ORAL at 17:29

## 2022-09-19 RX ADMIN — POTASSIUM CHLORIDE 20 MEQ: 29.8 INJECTION, SOLUTION INTRAVENOUS at 10:42

## 2022-09-19 RX ADMIN — ONDANSETRON 4 MG: 2 INJECTION INTRAMUSCULAR; INTRAVENOUS at 18:12

## 2022-09-19 RX ADMIN — SODIUM CHLORIDE, PRESERVATIVE FREE 40 MG: 5 INJECTION INTRAVENOUS at 08:07

## 2022-09-19 RX ADMIN — APIXABAN 5 MG: 5 TABLET, FILM COATED ORAL at 07:55

## 2022-09-19 RX ADMIN — METOCLOPRAMIDE HYDROCHLORIDE 10 MG: 10 TABLET ORAL at 06:18

## 2022-09-19 ASSESSMENT — PAIN DESCRIPTION - LOCATION
LOCATION: ABDOMEN
LOCATION: ABDOMEN

## 2022-09-19 ASSESSMENT — PAIN SCALES - GENERAL
PAINLEVEL_OUTOF10: 5
PAINLEVEL_OUTOF10: 2
PAINLEVEL_OUTOF10: 6
PAINLEVEL_OUTOF10: 0

## 2022-09-19 ASSESSMENT — PAIN DESCRIPTION - DESCRIPTORS: DESCRIPTORS: ACHING

## 2022-09-19 NOTE — CARE COORDINATION
LOS 7 d  Chart reviewed by [facility] for discharge planning  Patient to be discharged home with [address] [place] [address] when medically stable.     Will continue to follow for discharge planning needs  Please consult  if any new issues arise

## 2022-09-19 NOTE — PROGRESS NOTES
[facility] Progress Note         Admit Date:  9/12/2022    Today's Date:  9/19/2022    CC: Re-consult for intractable N/V and dysphagia    Subjective:     She denies nausea, vomiting, or abdominal pain at this time. She is concerned about possible jejunal inflammation. On PPI BID and Carafate QID. Barium esophagram results below. S/p EGD on 9/13/22 revealed a small Sussy-Zamorano tear at GEJ, no bleeding.      Medications:   Current Facility-Administered Medications   Medication Dose Route Frequency    sucralfate (CARAFATE) tablet 1 g  1 g Oral 4 times per day    aluminum & magnesium hydroxide-simethicone (MAALOX) 200-200-20 MG/5ML suspension 30 mL  30 mL Oral Q6H PRN    LORazepam (ATIVAN) injection 1 mg  1 mg IntraVENous Q6H PRN    pantoprazole (PROTONIX) 40 mg in sodium chloride (PF) 10 mL injection  40 mg IntraVENous Q12H    ondansetron (ZOFRAN) injection 4 mg  4 mg IntraVENous Q4H PRN    promethazine (PHENERGAN) injection 6.25 mg  6.25 mg IntraMUSCular Q6H PRN    0.9 % sodium chloride infusion   IntraVENous Continuous    HYDROcodone-acetaminophen (NORCO) 5-325 MG per tablet 1 tablet  1 tablet Oral Q4H PRN    diatrizoate meglumine-sodium (GASTROGRAFIN) 66-10 % solution 15 mL  15 mL Oral ONCE PRN    apixaban (ELIQUIS) tablet 5 mg  5 mg Oral BID    docusate sodium (COLACE) capsule 100 mg  100 mg Oral BID    metoclopramide (REGLAN) tablet 10 mg  10 mg Oral 4x Daily AC & HS    sodium chloride flush 0.9 % injection 5-40 mL  5-40 mL IntraVENous 2 times per day    sodium chloride flush 0.9 % injection 5-40 mL  5-40 mL IntraVENous PRN    0.9 % sodium chloride infusion   IntraVENous PRN    polyethylene glycol (GLYCOLAX) packet 17 g  17 g Oral Daily PRN    acetaminophen (TYLENOL) tablet 650 mg  650 mg Oral Q6H PRN    Or    acetaminophen (TYLENOL) suppository 650 mg  650 mg Rectal Q6H PRN    potassium chloride (KLOR-CON M) extended release tablet 40 mEq  40 mEq Oral PRN    Or    potassium bicarb-citric acid (EFFER-K) effervescent tablet 40 mEq  40 mEq Oral PRN    Or    potassium chloride 10 mEq/100 mL IVPB (Peripheral Line)  10 mEq IntraVENous PRN    magnesium sulfate 2000 mg in 50 mL IVPB premix  2,000 mg IntraVENous PRN    oxyCODONE (ROXICODONE) immediate release tablet 5 mg  5 mg Oral Q4H PRN       Review of Systems:  ROS was obtained, with pertinent positives as listed above. No chest pain or SOB. Diet:  NPO    Objective:   Vitals:  /83   Pulse 79   Temp 97.6 °F (36.4 °C) (Oral)   Resp 16   Ht 5' 5\" (1.651 m)   Wt 112 lb (50.8 kg)   SpO2 96%   BMI 18.64 kg/m²   Intake/Output:  09/19 0701 - 09/19 1900  In: 99 [I.V.:99]  Out: -   09/17 1901 - 09/19 0700  In: 5585 [P.O.:20; I.V.:3375]  Out: -   Exam:  General appearance: alert, cooperative, no distress, lying in bed. Right chest PAC. Lungs: clear to auscultation bilaterally anteriorly  Heart: regular rate and rhythm  Abdomen: soft, non-tender. Bowel sounds normal. No masses, no organomegaly  Extremities: extremities normal, atraumatic, no cyanosis or edema  Neuro:  alert and oriented    Data Review (Labs):    Recent Labs     09/17/22  0330 09/18/22  0357 09/19/22  0315   WBC 5.2 6.6 7.8   HGB 8.7* 8.9* 8.0*   HCT 28.5* 29.2* 25.1*    297 232   MCV 92.5 88.0 89.0    142 142   K 3.5 3.5 3.3*    107 112*   CO2 24 26 26   BUN 30* 23 20   MG  --  2.0 2.0   AST 15  --  42*   ALT 9*  --  27     Barium swallow 9/18/22       INDICATION: Difficulty swallowing post EGD       Fluoroscopy time: 1.1 minutes, 13 spot films       A single contrast barium swallow was performed. FINDINGS: Contrast passes normally through the esophagus. There is no evidence   of obstruction or leak. No ulceration is seen. There are surgical changes in the stomach consistent with gastric sleeve   surgery. Contrast passes normally into the proximal small bowel.   There is mild   proximal small bowel distention and mild fold thickening in the proximal   jejunum. Impression   1. No evidence of leak or obstruction. 2.  Possible inflammation in the proximal jejunum. Assessment:     Principal Problem:    Hematemesis  Active Problems:    Bilateral hydronephrosis    Peritoneal carcinomatosis (HCC)    Current use of long term anticoagulation    Intractable vomiting    Carcinoma of unknown primary ([address]  Resolved Problems:    * No resolved hospital problems. *    46 y.o. female with PMH including but not limited to metastatic carcinoma of unknown primary source dx 2/2022- on chemo (last dose 9/10/22), exlap 8/2022 for debulking of tumor (unsuccessful- tightly adherent) followed by Dr. Nafisa Brooks PE on Eliquis  since 6/2022 who was seen in RE-consultation at the request of Kris Garcia NP for intractable N/V with dysphagia. KUB was negative for obstruction. Pt intitially seen in consultation this admission for hematemesis and EGD 9/13/22 showed gastric sleeve anatomy without sign of bleeding. She also had increasing malignant ascites with 3800mL fluid removed on LVP 9/13 with cytology + for malignant cells. She has since been diagnosed with UTI which is being treated with Rocephin. Would like to avoid repeat sedation if possible. Barium swallow 9/18/22 revealed   1. No evidence of leak or obstruction. 2.  Possible inflammation in the proximal jejunum. Plan:     - Supportive care, maintain electrolytes, antiemetics  - Continue Protonix BID   - Continue Carafate 1 g QID       SCOTT Santoyo  [facility]     Patient is seen and examined in collaboration with Dr. Roe Briceno. Assessment and plan as per Dr. Eddie Wakefield.

## 2022-09-19 NOTE — PROGRESS NOTES
PHYSICAL THERAPY: Daily Note PM   (Link to Caseload Tracking: PT Visit Days : 2  Time In/Out PT Charge Capture  Rehab Caseload Tracker  Orders    Roro Shi is a 52 y.o. female   PRIMARY DIAGNOSIS: Hematemesis  Hematemesis [K92.0]  Generalized abdominal pain [R10.84]  Intractable vomiting with nausea, unspecified vomiting type [R11.2]  Procedure(s) (LRB):  EGD ESOPHAGOGASTRODUODENOSCOPY OFL 17 To IR after recovery for Para (N/A)  6 Days Post-Op  Inpatient: Payor: Nitza Swenson / Plan: [employer] [place] / Product Type: *No Product type* /     ASSESSMENT:     REHAB RECOMMENDATIONS:   Recommendation to date pending progress:  Setting:  Home Health Therapy    Equipment:    To Be Determined     ASSESSMENT:  Ms. Rafia Araujo was supine upon arrival and agreeable to therapy. SBA bed mobility/transfers with noted trunk sway during unsupported EOB sitting needing cues for safety awareness and trunk control. Ambulated 125' SBA pushing IV pole needing cues for activity pacing and safety awareness. In room, reevaluated patient understanding of benefits of staying mobile throughout day to promote blood flow and pressure relief. Steadily progressing towards and will continue PT efforts.      SUBJECTIVE:   Ms. Rafia Araujo states, \"Okay\"     Social/Functional    OBJECTIVE:     PAIN: [employer] / O2: PRECAUTION / Carylon Cornea / Tenzin Hui:   Pre Treatment:   Pain Level: 0      Post Treatment: 0 Vitals        Oxygen    IV and Purewick    RESTRICTIONS/PRECAUTIONS:        MOBILITY: I Mod I S SBA CGA Min Mod Max Total  NT x2 Comments:   Bed Mobility    Rolling [] [] [] [x] [] [] [] [] [] [] []    Supine to Sit [] [] [] [x] [] [] [] [] [] [] []    Scooting [] [] [] [x] [] [] [] [] [] [] []    Sit to Supine [] [] [] [x] [] [] [] [] [] [] []    Transfers    Sit to Stand [] [] [] [x] [] [] [] [] [] [] []    Bed to Chair [] [] [] [] [] [] [] [] [] [x] []    Stand to Sit [] [] [] [x] [] [] [] [] [] [] []     [] [] [] [] [] [] [] [] [] [] []    I=Independent, Mod I=Modified Independent, S=Supervision, SBA=Standby Assistance, CGA=Contact Guard Assistance,   Min=Minimal Assistance, Mod=Moderate Assistance, Max=Maximal Assistance, Total=Total Assistance, NT=Not Tested    BALANCE: Good Fair+ Fair Fair- Poor NT Comments   Sitting Static [] [x] [] [] [] []    Sitting Dynamic [] [] [x] [] [] []              Standing Static [] [] [x] [] [] []    Standing Dynamic [] [] [x] [] [] []      GAIT: I Mod I S SBA CGA Min Mod Max Total  NT x2 Comments:   Level of Assistance [] [] [] [x] [] [] [] [] [] [] []    Distance   125 feet    DME Pushing IV pole    Gait Quality Decreased step clearance and Decreased step length    Weightbearing Status      Stairs      I=Independent, Mod I=Modified Independent, S=Supervision, SBA=Standby Assistance, CGA=Contact Guard Assistance,   Min=Minimal Assistance, Mod=Moderate Assistance, Max=Maximal Assistance, Total=Total Assistance, NT=Not Tested    PLAN:   ACUTE PHYSICAL THERAPY GOALS:   (Developed with and agreed upon by patient and/or caregiver.)  Pt will perform all transfers Mod (I) without LOB/ miss-steps in 7 therapy sessions. Pt will ambulate 250 ft Mod (I) with use of LRAD and breaks as needed in 7 therapy sessions. Pt will perform standing dynamic balance activities with minimal postural sway in 7 therapy sessions. Pt will tolerate multiple sets and reps of BLE exercises in 7 therapy sessions. FREQUENCY AND DURATION:   for duration of hospital stay or until stated goals are met, whichever comes first.    TREATMENT:   TREATMENT:   Therapeutic Activity (13 Minutes): Therapeutic activity included Supine to Sit, Sit to Supine, Transfer Training, Ambulation on level ground, Sitting balance , and Standing balance to improve functional Activity tolerance, Balance, Coordination, and Mobility.     TREATMENT GRID:  N/A    AFTER TREATMENT PRECAUTIONS: Bed, Bed/Chair Locked, Call light within reach, Needs within reach, and RN notified    Santi Bateman COLLABORATION:  RN/ PCT, PT/ PTA, and SPTA    EDUCATION:      TIME IN/OUT:  Time In: 1431  Time Out: Jose Simon 42  Minutes: 1500 [facility].  CHANDANA Davis   [place], [place]

## 2022-09-19 NOTE — CONSULTS
Comprehensive Nutrition Assessment    Type and Reason for Visit: Consult  Poor Intake/Appetite 5 or More Days (Oncology)    Nutrition Recommendations/Plan:   Meals and Snacks:  Diet: Continue current diet and advance as medically appropriate. Will add no pork modifier. Nutrition Supplement Therapy:  Medical food supplement therapy:  Initiate Ensure Enlive three times per day (this provides 350 kcal and 20 grams protein per bottle)     Malnutrition Assessment:  Malnutrition Status: At risk for malnutrition (Comment) (+ wt loss but stable over last ~3 months, acute N/V with new ascites and possible carcinomatosis)       Nutrition Assessment:  Nutrition History: Patient states tolerating usual diet until ~1 week prior to admission. She states that she eats 3 smaller meals per day. She states she has been tolerating regular foods. She states that she has not tried Ensure or Boost. She endorses weight loss but states she has been stable over the last ~3 months (consistent with outpt office weights). She is thin appearing but good muscle retention when palpated. Do You Have Any Cultural, Yazdanism, or Ethnic Food Preferences?: Yes (Comment)   Nutrition Background:       Patient with metastatic cancer of unknown primary (dx 2/2022) on chemo with last cycle 9/8/22, recent ex lap to evaluate for possible debulking but unable. She presented with progressive nausea and vomiting (possibly blood) and progressive abdominal distention. She was found to have ascites and mesenteric peritoneal nodularity. She is s/p paracentesis and EGD 9/13. Paracentesis with 3.8 L removed, EGD with tiny Sussy Zamorano tear but no active bleeding. S/p barium esophagram 9/19 with possible jejunitis. Nutrition Interval:  Diet progression: 9/12 NPO; 9/13 CLD; 9/14 Regular diet; 9/15 \"no pork\" added; 9/17 NPO. Patient seen reclined in bed. She denies any current barriers to PO. She states she did not get a lunch tray today.  Explained that she is now on a FLD and described foods available. She is agreeable to Ensure until diet advanced and able to meet needs with intake of meals. Discussed using Ensure at home and she stated \"I guess I need to get some. \"    Current Nutrition Therapies:  ADULT DIET; Full Liquid    Current Intake:   Average Meal Intake:  (limited dated but 0-25% recorded) Average Supplements Intake: None Ordered      Anthropometric Measures:  Height: 5' 5\" (165.1 cm)  Current Body Wt: 111 lb 15.9 oz (50.8 kg) (9/14), Weight source: Stated  BMI: 18.6, Normal Weight (BMI 18.5-24. 9)  Admission Body Weight: 111 lb 15.9 oz (50.8 kg) (9/12 stated)  Ideal Body Weight (Kg) (Calculated): 57 kg (125 lbs), 89.6 %  Usual Body Wt: 136 lb (61.7 kg) (10/19/21 office weight), Percent weight change: -17.7       Edema:Peripheral Vascular  Peripheral Vascular (WDL): Within Defined Limits   BMI Category Normal Weight (BMI 18.5-24. 9)  Estimated Daily Nutrient Needs:  Energy (kcal/day): 6247-9259 (Kcal/kg (30-35) Weight used: 50.8 kg Current  Protein (g/day): 61-66 (1.2-1.3 g/kg) Weight Used: (Current) 50.8 kg  Fluid (ml/day):   (1 ml/kcal)    Nutrition Diagnosis:   Inadequate oral intake related to altered GI function as evidenced by nausea, vomiting (PO intake as above)  Nutrition Interventions:   Food and/or Nutrient Delivery: Continue Current Diet, Continue Oral Nutrition Supplement     Coordination of Nutrition Care: Continue to monitor while inpatient       Goals: Active Goal: PO intake 50% or greater       Nutrition Monitoring and Evaluation:      Food/Nutrient Intake Outcomes: Food and Nutrient Intake, Supplement Intake, Diet Advancement/Tolerance  Physical Signs/Symptoms Outcomes: Nausea or Vomiting, Meal Time Behavior, Weight    Discharge Planning:     Too soon to determine    GIGI GOODRICH, ROSALIA

## 2022-09-19 NOTE — PROGRESS NOTES
[facility] Hematology & Oncology        Inpatient Hematology / Oncology Progress Note      Admission Date: 2022  5:06 PM  Reason for Admission/Hospital Course: Hematemesis [K92.0]  Generalized abdominal pain [R10.84]  Intractable vomiting with nausea, unspecified vomiting type [R11.2]      24 Hour Events:  Afebrile, VSS  UA c/w UTI, on CTX - Completed yesterday  Barium esophagram-jejunitis    Transfusions: None  Replacements: K+    ROS:  Constitutional: negative for fever, chills. +weakness, malaise fatigue. CV: negative for chest pain, palpitations, edema. Respiratory: negative for dyspnea, cough, wheezing. GI: +nausea/vomiting. negative for abdominal pain, diarrhea. 10 point review of systems is otherwise negative with the exception of the elements mentioned above in the HPI. No Known Allergies    OBJECTIVE:  Patient Vitals for the past 8 hrs:   BP Temp Temp src Pulse Resp SpO2   22 1047 124/83 97.6 °F (36.4 °C) Oral 79 16 96 %   22 0732 123/83 97.9 °F (36.6 °C) Oral 95 16 96 %     Temp (24hrs), Av °F (36.7 °C), Min:97.5 °F (36.4 °C), Max:98.3 °F (36.8 °C)     0701 -  1900  In: 99 [I.V.:99]  Out: -     Physical Exam:  Constitutional: Well developed, thin appearing female in no acute distress, sitting comfortably in the hospital bed. HEENT: Normocephalic and atraumatic. Oropharynx is clear, mucous membranes are moist.  Extraocular muscles are intact. Sclerae anicteric. Skin Warm and dry. No bruising and no rash noted. No erythema. No pallor. Respiratory Lungs are clear to auscultation bilaterally without wheezes, rales or rhonchi, normal air exchange without accessory muscle use. CVS Normal rate, regular rhythm and normal S1 and S2. No murmurs, gallops, or rubs. Abdomen Soft, mildly tender and distended, normoactive bowel sounds. No palpable mass. No hepatosplenomegaly. Neuro Grossly nonfocal with no obvious sensory or motor deficits.    MSK Normal range of motion in general.  No edema and no tenderness. Psych Appropriate mood and affect. Labs:      Recent Labs     09/17/22  0330 09/18/22 0357 09/19/22 0315   WBC 5.2 6.6 7.8   RBC 3.08* 3.32* 2.82*   HGB 8.7* 8.9* 8.0*   HCT 28.5* 29.2* 25.1*   MCV 92.5 88.0 89.0   MCH 28.2 26.8 28.4   MCHC 30.5* 30.5* 31.9   RDW 14.9* 15.0* 15.6*    297 232   MPV 10.6 10.3 10.5        Recent Labs     09/17/22  0330 09/18/22 0357 09/19/22 0315    142 142   K 3.5 3.5 3.3*    107 112*   CO2 24 26 26   BUN 30* 23 20   GFRAA >60 >60 >60   GLOB 3.6*  --  3.3   MG  --  2.0 2.0         Imaging:  Xray Result (most recent):  XR ABDOMEN (KUB) (SINGLE AP VIEW) 09/17/2022    Narrative  KUB    CLINICAL INDICATION:  Vomiting, foul-smelling emesis, bilateral hydronephrosis  and ureteral stents. History of ascites, peritoneal carcinomatosis, unknown  primary. COMPARISON: CT 9/12/2022, radiography 3/2/2022    TECHNIQUE: AP view of the abdomen supine portable 9:22 AM    FINDINGS:  There are bilateral ureteral stents, similar in position compared  with recent CT. Generalized haziness again noted in keeping with ascites. Nonobstructive bowel gas pattern. Within limits of supine radiography no  evidence of developing free air, pneumatosis, pneumobilia, or osseous changes. Surgical suture material again projects over the stomach. There is a decreased  small amount of retained oral contrast in the GI tract. Lung bases demonstrate  no consolidation. Impression  1. No bowel obstruction evident. 2.  Ascites, bilateral ureteral stents as seen on recent CT.     CT Result (most recent):  CT ABDOMEN PELVIS W IV CONTRAST 09/12/2022    Narrative  EXAMINATION: CT  ABDOMEN / PELVIS   9/12/2022 8:06 PM    ACCESSION NUMBER:  [id number]    COMPARISON: 06/22/2022    INDICATION:  abdominal pain    TECHNIQUE: Contiguous axial computed tomographic images were obtained from the  domes of the diaphragm to the symphysis pubis following administration 100 mL  Iso-behzad 370. Coronal reconstructions were also performed. Oral contrast was also  administered. Radiation dose reduction techniques were used for this study. Our CT scanners  use one or all of the following: Automated exposure control, adjustment of the  mA and/or kV according to patient size, iterative reconstruction. FINDINGS:    Lung bases: The visualized lung bases are clear. The visualized portions of the  heart are unremarkable. Liver: Normal    Gallbladder: There is gallbladder wall thickening. The gallbladder is not  distended. Spleen: Normal    Adrenals: Normal    Pancreas: Normal    Kidneys: The kidneys enhance symmetrically. There is moderate to severe  bilateral hydronephrosis with ureteral stents in place. Bladder/: The urinary bladder is somewhat thick-walled. There is previous  hysterectomy. .    Bowel: There is large volume ascites. There is some peritoneal enhancement. There is not peritoneal nodularity. There is some nodularity within the omentum  and mesentery. There is previous gastric sleeve type procedure. The appendix is  normal.    Vessels: Normal    Abdominal wall: Intact    Bones: No suspicious lytic or blastic bony lesions. Impression  Large amount of intraabdominal ascites with mesenteric and peritoneal nodularity  as well as peritoneal enhancement is most concerning for peritoneal  carcinomatosis. Bilateral hydronephrosis with ureteral stents in place. Thick-walled urinary bladder. Correlate with evidence of cystitis. Thick-walled gallbladder is felt likely to be sympathetic. The gallbladder is  not distended.       Medications:  Current Facility-Administered Medications   Medication Dose Route Frequency    sucralfate (CARAFATE) tablet 1 g  1 g Oral 4 times per day    aluminum & magnesium hydroxide-simethicone (MAALOX) 200-200-20 MG/5ML suspension 30 mL  30 mL Oral Q6H PRN    LORazepam (ATIVAN) injection 1 mg  1 mg IntraVENous Q6H PRN    pantoprazole (PROTONIX) 40 mg in sodium chloride (PF) 10 mL injection  40 mg IntraVENous Q12H    ondansetron (ZOFRAN) injection 4 mg  4 mg IntraVENous Q4H PRN    promethazine (PHENERGAN) injection 6.25 mg  6.25 mg IntraMUSCular Q6H PRN    0.9 % sodium chloride infusion   IntraVENous Continuous    HYDROcodone-acetaminophen (NORCO) 5-325 MG per tablet 1 tablet  1 tablet Oral Q4H PRN    diatrizoate meglumine-sodium (GASTROGRAFIN) 66-10 % solution 15 mL  15 mL Oral ONCE PRN    apixaban (ELIQUIS) tablet 5 mg  5 mg Oral BID    docusate sodium (COLACE) capsule 100 mg  100 mg Oral BID    metoclopramide (REGLAN) tablet 10 mg  10 mg Oral 4x Daily AC & HS    sodium chloride flush 0.9 % injection 5-40 mL  5-40 mL IntraVENous 2 times per day    sodium chloride flush 0.9 % injection 5-40 mL  5-40 mL IntraVENous PRN    0.9 % sodium chloride infusion   IntraVENous PRN    polyethylene glycol (GLYCOLAX) packet 17 g  17 g Oral Daily PRN    acetaminophen (TYLENOL) tablet 650 mg  650 mg Oral Q6H PRN    Or    acetaminophen (TYLENOL) suppository 650 mg  650 mg Rectal Q6H PRN    potassium chloride (KLOR-CON M) extended release tablet 40 mEq  40 mEq Oral PRN    Or    potassium bicarb-citric acid (EFFER-K) effervescent tablet 40 mEq  40 mEq Oral PRN    Or    potassium chloride 10 mEq/100 mL IVPB (Peripheral Line)  10 mEq IntraVENous PRN    magnesium sulfate 2000 mg in 50 mL IVPB premix  2,000 mg IntraVENous PRN    oxyCODONE (ROXICODONE) immediate release tablet 5 mg  5 mg Oral Q4H PRN     Ms. Emanuel Lockett is a 52 y.o. female with medical history of PE on Eliquis and metastatic carcinoma of unknown primary followed by Dr. Colby Setting currently sp cycle 9 FOLFOX on 9/8/2022. She had ex-lap on 8/2022 to eval for debulking but tumor was found to be tightly adherent and impossible to debulk. She presented to ED with 1 day duration of hematemesis.  Pt reported she has been feeling abdominal distention that has progressively worsened over 1 week duration associated with mild abdominal discomfort. She stated that yesterday she vomited up what looked like bloody substance however she was unsure as she has drank red Gatorade earlier in the day. Per notes she had another episode of emesis in ED however at that time was nonbloody. CT AP shows large amount of intra-abdominal ascites with mesenteric/peritoneal nodularity concerning for peritoneal carcinomatosis; bilateral hydronephrosis with ureteral stents in place; thick-walled urinary bladder; thick-walled gallbladder felt to be sympathetic and not distended. IR is consulted for paracentesis with studies ordered. Eliquis is on hold and GI is consulted. PLAN:  CUP now with large volume ascites  -sp cycle 9 FOLFOX on 9/8/2022.  - CT AP shows large amount of intra-abdominal ascites with mesenteric/peritoneal nodularity concerning for peritoneal carcinomatosis; bilateral hydronephrosis with ureteral stents in place; thick-walled urinary bladder; thick-walled gallbladder felt to be sympathetic and not distended.  -Para with studies tomorrow-EGD today  9/14 EGD negative for bleed. Para -3800 studies ordered. Advance diet. 9/15 having nausea-antiemetics  9/16 complaining of acid reflux-added one time IV Pepcid-already on PO protonix bid     History of PE  -Eliquis on hold due to hematemesis  9/14 resume Eliquis     Hematemesis / Nausea, vomiting  -PPI BID  -GI planning EGD today  -Hgb 8.9 (11.8)  9/14 hgb stable 9.2  9/17 ongoing vomiting. Emend given yesterday. Increase Zofran to 4mg q 4 hours prn and increase Ativan to 1mg q 6 hours prn. Consider marinol if no improvement. KUB neg. No evidence of bleeding. States she feels like something is stuck in her esophagus. Reconsult GI.  9/18 GI added prn phenergan. Awaiting barium study. GI following. 9/19 Barium esophagram-No evidence of leak or obstruction. Possible inflammation in the proximal jejunum. GI added carafate.  We considered adding

## 2022-09-20 ENCOUNTER — APPOINTMENT (OUTPATIENT)
Dept: ULTRASOUND IMAGING | Age: 47
DRG: 374 | End: 2022-09-20
Payer: COMMERCIAL

## 2022-09-20 PROBLEM — R79.89 LFT ELEVATION: Status: ACTIVE | Noted: 2022-09-20

## 2022-09-20 LAB
ALBUMIN SERPL-MCNC: 2 G/DL (ref 3.5–5)
ALBUMIN/GLOB SERPL: 0.8 {RATIO} (ref 1.2–3.5)
ALP SERPL-CCNC: 376 U/L (ref 50–136)
ALT SERPL-CCNC: 184 U/L (ref 12–65)
ANION GAP SERPL CALC-SCNC: 5 MMOL/L (ref 4–13)
AST SERPL-CCNC: 331 U/L (ref 15–37)
BASOPHILS # BLD: 0 K/UL (ref 0–0.2)
BASOPHILS NFR BLD: 0 % (ref 0–2)
BILIRUB DIRECT SERPL-MCNC: 2.1 MG/DL
BILIRUB INDIRECT SERPL-MCNC: 0.5 MG/DL (ref 0–1.1)
BILIRUB SERPL-MCNC: 2.6 MG/DL (ref 0.2–1.1)
BILIRUB SERPL-MCNC: 2.8 MG/DL (ref 0.2–1.1)
BUN SERPL-MCNC: 14 MG/DL (ref 6–23)
CALCIUM SERPL-MCNC: 8 MG/DL (ref 8.3–10.4)
CHLORIDE SERPL-SCNC: 108 MMOL/L (ref 101–110)
CO2 SERPL-SCNC: 26 MMOL/L (ref 21–32)
CREAT SERPL-MCNC: 0.6 MG/DL (ref 0.6–1)
DIFFERENTIAL METHOD BLD: ABNORMAL
EOSINOPHIL # BLD: 0 K/UL (ref 0–0.8)
EOSINOPHIL NFR BLD: 0 % (ref 0.5–7.8)
ERYTHROCYTE [DISTWIDTH] IN BLOOD BY AUTOMATED COUNT: 15.9 % (ref 11.9–14.6)
FERRITIN SERPL-MCNC: 539 NG/ML (ref 8–388)
FOLATE SERPL-MCNC: 54.2 NG/ML (ref 3.1–17.5)
GLOBULIN SER CALC-MCNC: 2.5 G/DL (ref 2.3–3.5)
GLUCOSE SERPL-MCNC: 114 MG/DL (ref 65–100)
HCT VFR BLD AUTO: 25.4 % (ref 35.8–46.3)
HGB BLD-MCNC: 7.8 G/DL (ref 11.7–15.4)
IMM GRANULOCYTES # BLD AUTO: 0 K/UL (ref 0–0.5)
IMM GRANULOCYTES NFR BLD AUTO: 1 % (ref 0–5)
IRON SATN MFR SERPL: 31 %
IRON SERPL-MCNC: 38 UG/DL (ref 35–150)
LYMPHOCYTES # BLD: 0.5 K/UL (ref 0.5–4.6)
LYMPHOCYTES NFR BLD: 13 % (ref 13–44)
MAGNESIUM SERPL-MCNC: 1.6 MG/DL (ref 1.8–2.4)
MCH RBC QN AUTO: 27.2 PG (ref 26.1–32.9)
MCHC RBC AUTO-ENTMCNC: 30.7 G/DL (ref 31.4–35)
MCV RBC AUTO: 88.5 FL (ref 79.6–97.8)
MONOCYTES # BLD: 0.6 K/UL (ref 0.1–1.3)
MONOCYTES NFR BLD: 14 % (ref 4–12)
NEUTS SEG # BLD: 2.8 K/UL (ref 1.7–8.2)
NEUTS SEG NFR BLD: 72 % (ref 43–78)
NRBC # BLD: 0 K/UL (ref 0–0.2)
PLATELET # BLD AUTO: 214 K/UL (ref 150–450)
PMV BLD AUTO: 10.5 FL (ref 9.4–12.3)
POTASSIUM SERPL-SCNC: 3.5 MMOL/L (ref 3.5–5.1)
PROT SERPL-MCNC: 4.5 G/DL (ref 6.3–8.2)
RBC # BLD AUTO: 2.87 M/UL (ref 4.05–5.2)
SODIUM SERPL-SCNC: 139 MMOL/L (ref 136–145)
TIBC SERPL-MCNC: 122 UG/DL (ref 250–450)
VIT B12 SERPL-MCNC: 955 PG/ML (ref 193–986)
WBC # BLD AUTO: 3.9 K/UL (ref 4.3–11.1)

## 2022-09-20 PROCEDURE — 99233 SBSQ HOSP IP/OBS HIGH 50: CPT | Performed by: INTERNAL MEDICINE

## 2022-09-20 PROCEDURE — 6370000000 HC RX 637 (ALT 250 FOR IP): Performed by: FAMILY MEDICINE

## 2022-09-20 PROCEDURE — 85025 COMPLETE CBC W/AUTO DIFF WBC: CPT

## 2022-09-20 PROCEDURE — 82248 BILIRUBIN DIRECT: CPT

## 2022-09-20 PROCEDURE — 36591 DRAW BLOOD OFF VENOUS DEVICE: CPT

## 2022-09-20 PROCEDURE — 6370000000 HC RX 637 (ALT 250 FOR IP): Performed by: NURSE PRACTITIONER

## 2022-09-20 PROCEDURE — 1100000000 HC RM PRIVATE

## 2022-09-20 PROCEDURE — 82746 ASSAY OF FOLIC ACID SERUM: CPT

## 2022-09-20 PROCEDURE — 82728 ASSAY OF FERRITIN: CPT

## 2022-09-20 PROCEDURE — 82607 VITAMIN B-12: CPT

## 2022-09-20 PROCEDURE — A4216 STERILE WATER/SALINE, 10 ML: HCPCS | Performed by: NURSE PRACTITIONER

## 2022-09-20 PROCEDURE — C9113 INJ PANTOPRAZOLE SODIUM, VIA: HCPCS | Performed by: NURSE PRACTITIONER

## 2022-09-20 PROCEDURE — 83735 ASSAY OF MAGNESIUM: CPT

## 2022-09-20 PROCEDURE — 2580000003 HC RX 258: Performed by: FAMILY MEDICINE

## 2022-09-20 PROCEDURE — 76700 US EXAM ABDOM COMPLETE: CPT

## 2022-09-20 PROCEDURE — 80053 COMPREHEN METABOLIC PANEL: CPT

## 2022-09-20 PROCEDURE — 6370000000 HC RX 637 (ALT 250 FOR IP): Performed by: INTERNAL MEDICINE

## 2022-09-20 PROCEDURE — 2580000003 HC RX 258: Performed by: NURSE PRACTITIONER

## 2022-09-20 PROCEDURE — 6360000002 HC RX W HCPCS: Performed by: NURSE PRACTITIONER

## 2022-09-20 PROCEDURE — 83540 ASSAY OF IRON: CPT

## 2022-09-20 PROCEDURE — 6360000002 HC RX W HCPCS: Performed by: FAMILY MEDICINE

## 2022-09-20 RX ORDER — METOCLOPRAMIDE 10 MG/1
10 TABLET ORAL
Status: DISCONTINUED | OUTPATIENT
Start: 2022-09-20 | End: 2022-09-27

## 2022-09-20 RX ADMIN — SODIUM CHLORIDE, PRESERVATIVE FREE 20 ML: 5 INJECTION INTRAVENOUS at 08:05

## 2022-09-20 RX ADMIN — SUCRALFATE 1 G: 1 TABLET ORAL at 11:53

## 2022-09-20 RX ADMIN — OXYCODONE 5 MG: 5 TABLET ORAL at 00:24

## 2022-09-20 RX ADMIN — SUCRALFATE 1 G: 1 TABLET ORAL at 23:37

## 2022-09-20 RX ADMIN — SODIUM CHLORIDE, PRESERVATIVE FREE 40 MG: 5 INJECTION INTRAVENOUS at 08:11

## 2022-09-20 RX ADMIN — POTASSIUM CHLORIDE 10 MEQ: 7.46 INJECTION, SOLUTION INTRAVENOUS at 15:57

## 2022-09-20 RX ADMIN — METOCLOPRAMIDE 10 MG: 10 TABLET ORAL at 15:57

## 2022-09-20 RX ADMIN — SUCRALFATE 1 G: 1 TABLET ORAL at 06:32

## 2022-09-20 RX ADMIN — SUCRALFATE 1 G: 1 TABLET ORAL at 17:04

## 2022-09-20 RX ADMIN — OXYCODONE 5 MG: 5 TABLET ORAL at 23:37

## 2022-09-20 RX ADMIN — OXYCODONE 5 MG: 5 TABLET ORAL at 08:11

## 2022-09-20 RX ADMIN — APIXABAN 5 MG: 5 TABLET, FILM COATED ORAL at 08:11

## 2022-09-20 RX ADMIN — DOCUSATE SODIUM 100 MG: 100 CAPSULE, LIQUID FILLED ORAL at 20:33

## 2022-09-20 RX ADMIN — POTASSIUM CHLORIDE 10 MEQ: 7.46 INJECTION, SOLUTION INTRAVENOUS at 12:51

## 2022-09-20 RX ADMIN — SODIUM CHLORIDE, PRESERVATIVE FREE 40 MG: 5 INJECTION INTRAVENOUS at 20:34

## 2022-09-20 RX ADMIN — ONDANSETRON 4 MG: 2 INJECTION INTRAMUSCULAR; INTRAVENOUS at 00:24

## 2022-09-20 RX ADMIN — ONDANSETRON 4 MG: 2 INJECTION INTRAMUSCULAR; INTRAVENOUS at 08:11

## 2022-09-20 RX ADMIN — METOCLOPRAMIDE HYDROCHLORIDE 10 MG: 10 TABLET ORAL at 06:32

## 2022-09-20 RX ADMIN — SODIUM CHLORIDE, PRESERVATIVE FREE 10 ML: 5 INJECTION INTRAVENOUS at 20:34

## 2022-09-20 RX ADMIN — ONDANSETRON 4 MG: 2 INJECTION INTRAMUSCULAR; INTRAVENOUS at 23:37

## 2022-09-20 RX ADMIN — APIXABAN 5 MG: 5 TABLET, FILM COATED ORAL at 20:34

## 2022-09-20 RX ADMIN — OXYCODONE 5 MG: 5 TABLET ORAL at 17:33

## 2022-09-20 ASSESSMENT — PAIN DESCRIPTION - ORIENTATION
ORIENTATION: MID
ORIENTATION: MID;ANTERIOR
ORIENTATION: MID;ANTERIOR

## 2022-09-20 ASSESSMENT — PAIN DESCRIPTION - FREQUENCY
FREQUENCY: CONTINUOUS

## 2022-09-20 ASSESSMENT — PAIN SCALES - GENERAL
PAINLEVEL_OUTOF10: 6
PAINLEVEL_OUTOF10: 0
PAINLEVEL_OUTOF10: 8
PAINLEVEL_OUTOF10: 0
PAINLEVEL_OUTOF10: 8
PAINLEVEL_OUTOF10: 5
PAINLEVEL_OUTOF10: 7

## 2022-09-20 ASSESSMENT — PAIN DESCRIPTION - ONSET
ONSET: ON-GOING
ONSET: GRADUAL

## 2022-09-20 ASSESSMENT — PAIN - FUNCTIONAL ASSESSMENT
PAIN_FUNCTIONAL_ASSESSMENT: PREVENTS OR INTERFERES SOME ACTIVE ACTIVITIES AND ADLS
PAIN_FUNCTIONAL_ASSESSMENT: ACTIVITIES ARE NOT PREVENTED
PAIN_FUNCTIONAL_ASSESSMENT: ACTIVITIES ARE NOT PREVENTED

## 2022-09-20 ASSESSMENT — PAIN DESCRIPTION - LOCATION
LOCATION: ABDOMEN

## 2022-09-20 ASSESSMENT — PAIN DESCRIPTION - PAIN TYPE
TYPE: CHRONIC PAIN

## 2022-09-20 ASSESSMENT — PAIN DESCRIPTION - DESCRIPTORS
DESCRIPTORS: ACHING
DESCRIPTORS: ACHING
DESCRIPTORS: ACHING;DISCOMFORT

## 2022-09-20 NOTE — PROGRESS NOTES
[facility] Hematology & Oncology        Inpatient Hematology / Oncology Progress Note      Admission Date: 2022  5:06 PM  Reason for Admission/Hospital Course: Hematemesis [K92.0]  Generalized abdominal pain [R10.84]  Intractable vomiting with nausea, unspecified vomiting type [R11.2]      24 Hour Events:  LFTs and bili increasing  Taper Reglan  US ordered    ROS:  Constitutional: negative for fever, chills. +weakness, malaise fatigue. CV: negative for chest pain, palpitations, edema. Respiratory: negative for dyspnea, cough, wheezing. GI: +nausea/vomiting. negative for abdominal pain, diarrhea. 10 point review of systems is otherwise negative with the exception of the elements mentioned above in the HPI. No Known Allergies    OBJECTIVE:  Patient Vitals for the past 8 hrs:   BP Temp Temp src Pulse Resp SpO2   22 0750 123/85 97.2 °F (36.2 °C) Oral 97 15 95 %   22 0301 103/73 97.7 °F (36.5 °C) Oral (!) 103 16 96 %     Temp (24hrs), Av.8 °F (36.6 °C), Min:97.2 °F (36.2 °C), Max:98.7 °F (37.1 °C)     0701 -  1900  In: -   Out: 50 [Urine:50]    Physical Exam:  Constitutional: Well developed, thin appearing female in no acute distress, sitting comfortably in the hospital bed. HEENT: Normocephalic and atraumatic. Oropharynx is clear, mucous membranes are moist.  Extraocular muscles are intact. Sclerae anicteric. Skin Warm and dry. No bruising and no rash noted. No erythema. Jaundice   Respiratory Lungs are clear to auscultation bilaterally without wheezes, rales or rhonchi, normal air exchange without accessory muscle use. CVS Normal rate, regular rhythm and normal S1 and S2. No murmurs, gallops, or rubs. Abdomen Soft, mildly tender and distended, normoactive bowel sounds. No palpable mass. No hepatosplenomegaly. Neuro Grossly nonfocal with no obvious sensory or motor deficits. MSK Normal range of motion in general.  No edema and no tenderness.    Psych Appropriate mood and affect. Labs:      Recent Labs     09/18/22 0357 09/19/22 0315 09/20/22 0312   WBC 6.6 7.8 3.9*   RBC 3.32* 2.82* 2.87*   HGB 8.9* 8.0* 7.8*   HCT 29.2* 25.1* 25.4*   MCV 88.0 89.0 88.5   MCH 26.8 28.4 27.2   MCHC 30.5* 31.9 30.7*   RDW 15.0* 15.6* 15.9*    232 214   MPV 10.3 10.5 10.5        Recent Labs     09/18/22 0357 09/19/22 0315 09/20/22 0312    142 139   K 3.5 3.3* 3.5    112* 108   CO2 26 26 26   BUN 23 20 14   GFRAA >60 >60 >60   GLOB  --  3.3 2.5   MG 2.0 2.0 1.6*         Imaging:  Xray Result (most recent):  XR ABDOMEN (KUB) (SINGLE AP VIEW) 09/17/2022    Narrative  KUB    CLINICAL INDICATION:  Vomiting, foul-smelling emesis, bilateral hydronephrosis  and ureteral stents. History of ascites, peritoneal carcinomatosis, unknown  primary. COMPARISON: CT 9/12/2022, radiography 3/2/2022    TECHNIQUE: AP view of the abdomen supine portable 9:22 AM    FINDINGS:  There are bilateral ureteral stents, similar in position compared  with recent CT. Generalized haziness again noted in keeping with ascites. Nonobstructive bowel gas pattern. Within limits of supine radiography no  evidence of developing free air, pneumatosis, pneumobilia, or osseous changes. Surgical suture material again projects over the stomach. There is a decreased  small amount of retained oral contrast in the GI tract. Lung bases demonstrate  no consolidation. Impression  1. No bowel obstruction evident. 2.  Ascites, bilateral ureteral stents as seen on recent CT. CT Result (most recent):  CT ABDOMEN PELVIS W IV CONTRAST 09/12/2022    Narrative  EXAMINATION: CT  ABDOMEN / PELVIS   9/12/2022 8:06 PM    ACCESSION NUMBER:  [id number]    COMPARISON: 06/22/2022    INDICATION:  abdominal pain    TECHNIQUE: Contiguous axial computed tomographic images were obtained from the  domes of the diaphragm to the symphysis pubis following administration 100 mL  Iso-behzad 370.  Coronal reconstructions were also performed. Oral contrast was also  administered. Radiation dose reduction techniques were used for this study. Our CT scanners  use one or all of the following: Automated exposure control, adjustment of the  mA and/or kV according to patient size, iterative reconstruction. FINDINGS:    Lung bases: The visualized lung bases are clear. The visualized portions of the  heart are unremarkable. Liver: Normal    Gallbladder: There is gallbladder wall thickening. The gallbladder is not  distended. Spleen: Normal    Adrenals: Normal    Pancreas: Normal    Kidneys: The kidneys enhance symmetrically. There is moderate to severe  bilateral hydronephrosis with ureteral stents in place. Bladder/: The urinary bladder is somewhat thick-walled. There is previous  hysterectomy. .    Bowel: There is large volume ascites. There is some peritoneal enhancement. There is not peritoneal nodularity. There is some nodularity within the omentum  and mesentery. There is previous gastric sleeve type procedure. The appendix is  normal.    Vessels: Normal    Abdominal wall: Intact    Bones: No suspicious lytic or blastic bony lesions. Impression  Large amount of intraabdominal ascites with mesenteric and peritoneal nodularity  as well as peritoneal enhancement is most concerning for peritoneal  carcinomatosis. Bilateral hydronephrosis with ureteral stents in place. Thick-walled urinary bladder. Correlate with evidence of cystitis. Thick-walled gallbladder is felt likely to be sympathetic. The gallbladder is  not distended.       Medications:  Current Facility-Administered Medications   Medication Dose Route Frequency    sucralfate (CARAFATE) tablet 1 g  1 g Oral 4 times per day    aluminum & magnesium hydroxide-simethicone (MAALOX) 200-200-20 MG/5ML suspension 30 mL  30 mL Oral Q6H PRN    LORazepam (ATIVAN) injection 1 mg  1 mg IntraVENous Q6H PRN    pantoprazole (PROTONIX) 40 mg in sodium She stated that yesterday she vomited up what looked like bloody substance however she was unsure as she has drank red Gatorade earlier in the day. Per notes she had another episode of emesis in ED however at that time was nonbloody. CT AP shows large amount of intra-abdominal ascites with mesenteric/peritoneal nodularity concerning for peritoneal carcinomatosis; bilateral hydronephrosis with ureteral stents in place; thick-walled urinary bladder; thick-walled gallbladder felt to be sympathetic and not distended. IR is consulted for paracentesis with studies ordered. Eliquis is on hold and GI is consulted. PLAN:  CUP now with large volume ascites  -sp cycle 9 FOLFOX on 9/8/2022.  - CT AP shows large amount of intra-abdominal ascites with mesenteric/peritoneal nodularity concerning for peritoneal carcinomatosis; bilateral hydronephrosis with ureteral stents in place; thick-walled urinary bladder; thick-walled gallbladder felt to be sympathetic and not distended.  -Para with studies tomorrow-EGD today  9/14 EGD negative for bleed. Para -3800 studies ordered. Advance diet. 9/15 having nausea-antiemetics  9/16 complaining of acid reflux-added one time IV Pepcid-already on PO protonix bid  9/20 may need another para-follow up on AB US but may need repeat CT to evaluate     History of PE  -Eliquis on hold due to hematemesis  9/14 resume Eliquis     Hematemesis / Nausea, vomiting  -PPI BID  -GI planning EGD today  -Hgb 8.9 (11.8)  9/14 hgb stable 9.2  9/17 ongoing vomiting. Emend given yesterday. Increase Zofran to 4mg q 4 hours prn and increase Ativan to 1mg q 6 hours prn. Consider marinol if no improvement. KUB neg. No evidence of bleeding. States she feels like something is stuck in her esophagus. Reconsult GI.  9/18 GI added prn phenergan. Awaiting barium study. GI following. 9/19 Barium esophagram-No evidence of leak or obstruction. Possible inflammation in the proximal jejunum. GI added carafate.  We considered adding zyprexa but interacts to reglan. 9/20 taper reglan dt ellevated LFT and bili    Elevated creatinine  9/16 added fluids NS @ 75/hr  9/18 Cr down to 0.7  RESOLVED    UTI  9/16 Noted cystitis on CT. UA on 9/13 c/w UTI - not treated. Repeat UA (c/w UTI). Ucx ordered. Start CTX  9/17 Ucx pending. Con't CTX (D2).  9/18 Ucx-NGTD. Completes CTX today. Elevated LFT and bili  9/20 Liver US ordered    Continue home meds  Andres sOPs  SCDs for DVT ppx    Goals and plan of care reviewed with the patient. All questions answered to the best of our ability. Disposition:  Anticipate discharge home once sx controlled. PT recommends HH at discharge.             Heidy Sales, SCOTT - NP   [facility]ology [facility]  [address] [place] [address]  Office : [phone]  Fax : [phone]

## 2022-09-20 NOTE — PROGRESS NOTES
[facility] Progress Note         Admit Date:  9/12/2022    Today's Date:  9/20/2022    CC: Re-consult for intractable N/V    Subjective:     Patient reports continued nausea with vomiting. She has intermittent epigastric pain.       Medications:   Current Facility-Administered Medications   Medication Dose Route Frequency    sucralfate (CARAFATE) tablet 1 g  1 g Oral 4 times per day    aluminum & magnesium hydroxide-simethicone (MAALOX) 200-200-20 MG/5ML suspension 30 mL  30 mL Oral Q6H PRN    LORazepam (ATIVAN) injection 1 mg  1 mg IntraVENous Q6H PRN    pantoprazole (PROTONIX) 40 mg in sodium chloride (PF) 10 mL injection  40 mg IntraVENous Q12H    ondansetron (ZOFRAN) injection 4 mg  4 mg IntraVENous Q4H PRN    promethazine (PHENERGAN) injection 6.25 mg  6.25 mg IntraMUSCular Q6H PRN    0.9 % sodium chloride infusion   IntraVENous Continuous    HYDROcodone-acetaminophen (NORCO) 5-325 MG per tablet 1 tablet  1 tablet Oral Q4H PRN    diatrizoate meglumine-sodium (GASTROGRAFIN) 66-10 % solution 15 mL  15 mL Oral ONCE PRN    apixaban (ELIQUIS) tablet 5 mg  5 mg Oral BID    docusate sodium (COLACE) capsule 100 mg  100 mg Oral BID    metoclopramide (REGLAN) tablet 10 mg  10 mg Oral 4x Daily AC & HS    sodium chloride flush 0.9 % injection 5-40 mL  5-40 mL IntraVENous 2 times per day    sodium chloride flush 0.9 % injection 5-40 mL  5-40 mL IntraVENous PRN    0.9 % sodium chloride infusion   IntraVENous PRN    polyethylene glycol (GLYCOLAX) packet 17 g  17 g Oral Daily PRN    acetaminophen (TYLENOL) tablet 650 mg  650 mg Oral Q6H PRN    Or    acetaminophen (TYLENOL) suppository 650 mg  650 mg Rectal Q6H PRN    potassium chloride (KLOR-CON M) extended release tablet 40 mEq  40 mEq Oral PRN    Or    potassium bicarb-citric acid (EFFER-K) effervescent tablet 40 mEq  40 mEq Oral PRN    Or    potassium chloride 10 mEq/100 mL IVPB (Peripheral Line)  10 mEq IntraVENous PRN    magnesium sulfate 2000 mg in 50 mL IVPB premix  2,000 mg IntraVENous PRN    oxyCODONE (ROXICODONE) immediate release tablet 5 mg  5 mg Oral Q4H PRN       Review of Systems:  ROS was obtained, with pertinent positives as listed above. No chest pain or SOB. Diet:  Full liquid, Nutrition supplements    Objective:   Vitals:  /85   Pulse 97   Temp 97.2 °F (36.2 °C) (Oral)   Resp 15   Ht 5' 5\" (1.651 m)   Wt 112 lb (50.8 kg)   SpO2 95%   BMI 18.64 kg/m²   Intake/Output:  09/20 0701 - 09/20 1900  In: -   Out: 50 [Urine:50]  09/18 1901 - 09/20 0700  In: 1635 [P.O.:300; I.V.:1525]  Out: 250 [Urine:250]  Exam:  General appearance: alert, cooperative, no distress, lying in bed. Right chest PAC.   Lungs: clear to auscultation bilaterally anteriorly  Heart: regular rate and rhythm  Abdomen: firm, mild diffuse TTP, Bowel sounds normal. No masses, no organomegaly  Extremities: extremities normal, atraumatic, no cyanosis or edema  Neuro:  alert and oriented    Data Review (Labs):    Recent Labs     09/18/22  0357 09/19/22  0315 09/20/22  0312   WBC 6.6 7.8 3.9*   HGB 8.9* 8.0* 7.8*   HCT 29.2* 25.1* 25.4*    232 214   MCV 88.0 89.0 88.5    142 139   K 3.5 3.3* 3.5    112* 108   CO2 26 26 26   BUN 23 20 14   MG 2.0 2.0 1.6*   AST  --  42* 331*   ALT  --  27 184*      Latest Reference Range & Units 9/20/22 03:12   Albumin 3.5 - 5.0 g/dL 2.0 (L)   Globulin 2.3 - 3.5 g/dL 2.5   ALBUMIN/GLOBULIN RATIO 1.2 - 3.5   0.8 (L)   Alk Phosphatase 50 - 136 U/L 376 (H)   ALT 12 - 65 U/L 184 (H)   AST 15 - 37 U/L 331 (H)   Bilirubin 0.2 - 1.1 MG/DL 2.8 (H)   Total Protein 6.3 - 8.2 g/dL 4.5 (L)   (L): Data is abnormally low  (H): Data is abnormally high    Assessment:     Principal Problem:    Hematemesis  Active Problems:    Bilateral hydronephrosis    Peritoneal carcinomatosis (HCC)    Current use of long term anticoagulation    Intractable vomiting    Generalized abdominal pain    Carcinoma of unknown primary ([address]  Resolved Problems:    * No resolved hospital problems. *    46 y.o. female with PMH including but not limited to metastatic carcinoma of unknown primary source dx 2/2022- on chemo (last dose 9/10/22), exlap 8/2022 for debulking of tumor (unsuccessful- tightly adherent) followed by Dr. Nolan Major PE on Eliquis  since 6/2022 who was seen in RE-consultation at the request of Gela Cerna NP for intractable N/V with dysphagia. KUB was negative for obstruction. Pt intitially seen in consultation this admission for hematemesis and EGD 9/13/22 showed gastric sleeve anatomy without sign of bleeding. She also had increasing malignant ascites with 3800mL fluid removed on LVP 9/13 with cytology + for malignant cells. She has since been diagnosed with UTI which is being treated with Rocephin. Would like to avoid repeat sedation if possible. Barium swallow 9/18/22 revealed   1. No evidence of leak or obstruction. 2.  Possible inflammation in the proximal jejunum. 9/19/22 Discussed repeating EGD with the patient but we discussed that this would likely not change our current management. She would like to hold off on further endoscopies to avoid repeat anesthesia. 9/20/22 LFT's elevated this morning. Abdominal US ordered and is pending. Plan:     - Supportive care, maintain electrolytes, antiemetics  - Continue Protonix BID   - Continue Carafate 1 g QID   - Nutritional supplements advised   - Abdominal US pending for elevated LFTs        SCOTT Lin  [facility]      Patient is seen and examined in collaboration with Dr. Mirian Snow. Assessment and plan as per Dr. Rommel Lebron.

## 2022-09-20 NOTE — PROGRESS NOTES
PT Daily Note  Attempted to see patient for physical therapy today but refused first attempt asking therapy to come back but on second attempt patient was off floor at current time for testing procedure. Will attempt to see at later time/date. Thank you,  Judith Davis PTA  [place], [place]

## 2022-09-21 ENCOUNTER — APPOINTMENT (OUTPATIENT)
Dept: MRI IMAGING | Age: 47
DRG: 374 | End: 2022-09-21
Payer: COMMERCIAL

## 2022-09-21 DIAGNOSIS — C78.6 PERITONEAL CARCINOMATOSIS (HCC): Primary | ICD-10-CM

## 2022-09-21 PROBLEM — N13.5 OBSTRUCTION OF BOTH URETERS: Status: ACTIVE | Noted: 2022-09-21

## 2022-09-21 LAB
ALBUMIN SERPL-MCNC: 1.7 G/DL (ref 3.5–5)
ALBUMIN/GLOB SERPL: 0.5 {RATIO} (ref 1.2–3.5)
ALP SERPL-CCNC: 332 U/L (ref 50–136)
ALT SERPL-CCNC: 156 U/L (ref 12–65)
ANION GAP SERPL CALC-SCNC: 8 MMOL/L (ref 4–13)
APPEARANCE UR: ABNORMAL
AST SERPL-CCNC: 180 U/L (ref 15–37)
BACTERIA URNS QL MICRO: NORMAL /HPF
BASOPHILS # BLD: 0 K/UL (ref 0–0.2)
BASOPHILS NFR BLD: 0 % (ref 0–2)
BILIRUB SERPL-MCNC: 2.8 MG/DL (ref 0.2–1.1)
BILIRUB UR QL: ABNORMAL
BUN SERPL-MCNC: 9 MG/DL (ref 6–23)
CALCIUM SERPL-MCNC: 8 MG/DL (ref 8.3–10.4)
CASTS URNS QL MICRO: 0 /LPF
CHLORIDE SERPL-SCNC: 108 MMOL/L (ref 101–110)
CO2 SERPL-SCNC: 21 MMOL/L (ref 21–32)
COLOR UR: ABNORMAL
CREAT SERPL-MCNC: 0.5 MG/DL (ref 0.6–1)
CRYSTALS URNS QL MICRO: 0 /LPF
DIFFERENTIAL METHOD BLD: ABNORMAL
EOSINOPHIL # BLD: 0 K/UL (ref 0–0.8)
EOSINOPHIL NFR BLD: 0 % (ref 0.5–7.8)
EPI CELLS #/AREA URNS HPF: NORMAL /HPF
ERYTHROCYTE [DISTWIDTH] IN BLOOD BY AUTOMATED COUNT: 16 % (ref 11.9–14.6)
GLOBULIN SER CALC-MCNC: 3.2 G/DL (ref 2.3–3.5)
GLUCOSE SERPL-MCNC: 90 MG/DL (ref 65–100)
GLUCOSE UR STRIP.AUTO-MCNC: NEGATIVE MG/DL
HCT VFR BLD AUTO: 24 % (ref 35.8–46.3)
HGB BLD-MCNC: 7.6 G/DL (ref 11.7–15.4)
HGB UR QL STRIP: ABNORMAL
IMM GRANULOCYTES # BLD AUTO: 0 K/UL (ref 0–0.5)
IMM GRANULOCYTES NFR BLD AUTO: 0 % (ref 0–5)
KETONES UR QL STRIP.AUTO: 40 MG/DL
LEUKOCYTE ESTERASE UR QL STRIP.AUTO: ABNORMAL
LYMPHOCYTES # BLD: 0.4 K/UL (ref 0.5–4.6)
LYMPHOCYTES NFR BLD: 10 % (ref 13–44)
MAGNESIUM SERPL-MCNC: 1.9 MG/DL (ref 1.8–2.4)
MCH RBC QN AUTO: 27.2 PG (ref 26.1–32.9)
MCHC RBC AUTO-ENTMCNC: 31.7 G/DL (ref 31.4–35)
MCV RBC AUTO: 86 FL (ref 79.6–97.8)
MONOCYTES # BLD: 0.8 K/UL (ref 0.1–1.3)
MONOCYTES NFR BLD: 17 % (ref 4–12)
MUCOUS THREADS URNS QL MICRO: 0 /LPF
NEUTS SEG # BLD: 3.3 K/UL (ref 1.7–8.2)
NEUTS SEG NFR BLD: 73 % (ref 43–78)
NITRITE UR QL STRIP.AUTO: NEGATIVE
NRBC # BLD: 0 K/UL (ref 0–0.2)
OTHER OBSERVATIONS: NORMAL
PH UR STRIP: 6.5 [PH] (ref 5–9)
PLATELET # BLD AUTO: 223 K/UL (ref 150–450)
PMV BLD AUTO: 10.6 FL (ref 9.4–12.3)
POTASSIUM SERPL-SCNC: 3.1 MMOL/L (ref 3.5–5.1)
PROT SERPL-MCNC: 4.9 G/DL (ref 6.3–8.2)
PROT UR STRIP-MCNC: 100 MG/DL
RBC # BLD AUTO: 2.79 M/UL (ref 4.05–5.2)
RBC #/AREA URNS HPF: >100 /HPF
SODIUM SERPL-SCNC: 137 MMOL/L (ref 136–145)
SP GR UR REFRACTOMETRY: 1.01 (ref 1–1.02)
UROBILINOGEN UR QL STRIP.AUTO: 1 EU/DL (ref 0.2–1)
WBC # BLD AUTO: 4.6 K/UL (ref 4.3–11.1)
WBC URNS QL MICRO: NORMAL /HPF

## 2022-09-21 PROCEDURE — A4216 STERILE WATER/SALINE, 10 ML: HCPCS | Performed by: NURSE PRACTITIONER

## 2022-09-21 PROCEDURE — 6360000002 HC RX W HCPCS: Performed by: NURSE PRACTITIONER

## 2022-09-21 PROCEDURE — 6360000002 HC RX W HCPCS: Performed by: INTERNAL MEDICINE

## 2022-09-21 PROCEDURE — 97530 THERAPEUTIC ACTIVITIES: CPT

## 2022-09-21 PROCEDURE — 99233 SBSQ HOSP IP/OBS HIGH 50: CPT | Performed by: INTERNAL MEDICINE

## 2022-09-21 PROCEDURE — C9113 INJ PANTOPRAZOLE SODIUM, VIA: HCPCS | Performed by: NURSE PRACTITIONER

## 2022-09-21 PROCEDURE — 74181 MRI ABDOMEN W/O CONTRAST: CPT

## 2022-09-21 PROCEDURE — 81015 MICROSCOPIC EXAM OF URINE: CPT

## 2022-09-21 PROCEDURE — 83735 ASSAY OF MAGNESIUM: CPT

## 2022-09-21 PROCEDURE — 85025 COMPLETE CBC W/AUTO DIFF WBC: CPT

## 2022-09-21 PROCEDURE — 2580000003 HC RX 258: Performed by: FAMILY MEDICINE

## 2022-09-21 PROCEDURE — 99222 1ST HOSP IP/OBS MODERATE 55: CPT | Performed by: INTERNAL MEDICINE

## 2022-09-21 PROCEDURE — 6370000000 HC RX 637 (ALT 250 FOR IP): Performed by: NURSE PRACTITIONER

## 2022-09-21 PROCEDURE — 99223 1ST HOSP IP/OBS HIGH 75: CPT | Performed by: UROLOGY

## 2022-09-21 PROCEDURE — 6370000000 HC RX 637 (ALT 250 FOR IP): Performed by: FAMILY MEDICINE

## 2022-09-21 PROCEDURE — 80053 COMPREHEN METABOLIC PANEL: CPT

## 2022-09-21 PROCEDURE — 1100000000 HC RM PRIVATE

## 2022-09-21 PROCEDURE — 81003 URINALYSIS AUTO W/O SCOPE: CPT

## 2022-09-21 PROCEDURE — 87086 URINE CULTURE/COLONY COUNT: CPT

## 2022-09-21 PROCEDURE — 2400000000

## 2022-09-21 PROCEDURE — 2580000003 HC RX 258: Performed by: NURSE PRACTITIONER

## 2022-09-21 RX ORDER — HYDROMORPHONE HYDROCHLORIDE 1 MG/ML
1 INJECTION, SOLUTION INTRAMUSCULAR; INTRAVENOUS; SUBCUTANEOUS ONCE
Status: COMPLETED | OUTPATIENT
Start: 2022-09-21 | End: 2022-09-21

## 2022-09-21 RX ADMIN — METOCLOPRAMIDE 10 MG: 10 TABLET ORAL at 15:36

## 2022-09-21 RX ADMIN — PROMETHAZINE HYDROCHLORIDE 6.25 MG: 25 INJECTION INTRAMUSCULAR; INTRAVENOUS at 15:37

## 2022-09-21 RX ADMIN — HYDROMORPHONE HYDROCHLORIDE 1 MG: 1 INJECTION, SOLUTION INTRAMUSCULAR; INTRAVENOUS; SUBCUTANEOUS at 08:32

## 2022-09-21 RX ADMIN — HYDROCODONE BITARTRATE AND ACETAMINOPHEN 1 TABLET: 5; 325 TABLET ORAL at 19:54

## 2022-09-21 RX ADMIN — APIXABAN 5 MG: 5 TABLET, FILM COATED ORAL at 08:32

## 2022-09-21 RX ADMIN — POTASSIUM CHLORIDE 40 MEQ: 1500 TABLET, EXTENDED RELEASE ORAL at 17:40

## 2022-09-21 RX ADMIN — OXYCODONE 5 MG: 5 TABLET ORAL at 15:37

## 2022-09-21 RX ADMIN — ONDANSETRON 4 MG: 2 INJECTION INTRAMUSCULAR; INTRAVENOUS at 19:54

## 2022-09-21 RX ADMIN — SODIUM CHLORIDE, PRESERVATIVE FREE 40 MG: 5 INJECTION INTRAVENOUS at 21:17

## 2022-09-21 RX ADMIN — SODIUM CHLORIDE, PRESERVATIVE FREE 10 ML: 5 INJECTION INTRAVENOUS at 21:22

## 2022-09-21 RX ADMIN — SODIUM CHLORIDE, PRESERVATIVE FREE 40 MG: 5 INJECTION INTRAVENOUS at 08:32

## 2022-09-21 RX ADMIN — OXYCODONE 5 MG: 5 TABLET ORAL at 21:17

## 2022-09-21 RX ADMIN — SODIUM CHLORIDE, PRESERVATIVE FREE 10 ML: 5 INJECTION INTRAVENOUS at 08:32

## 2022-09-21 RX ADMIN — DOCUSATE SODIUM 100 MG: 100 CAPSULE, LIQUID FILLED ORAL at 08:32

## 2022-09-21 ASSESSMENT — PAIN SCALES - GENERAL
PAINLEVEL_OUTOF10: 6
PAINLEVEL_OUTOF10: 3
PAINLEVEL_OUTOF10: 0
PAINLEVEL_OUTOF10: 7
PAINLEVEL_OUTOF10: 2
PAINLEVEL_OUTOF10: 5
PAINLEVEL_OUTOF10: 0
PAINLEVEL_OUTOF10: 6
PAINLEVEL_OUTOF10: 6

## 2022-09-21 ASSESSMENT — PAIN - FUNCTIONAL ASSESSMENT
PAIN_FUNCTIONAL_ASSESSMENT: ACTIVITIES ARE NOT PREVENTED
PAIN_FUNCTIONAL_ASSESSMENT: PREVENTS OR INTERFERES SOME ACTIVE ACTIVITIES AND ADLS
PAIN_FUNCTIONAL_ASSESSMENT: PREVENTS OR INTERFERES SOME ACTIVE ACTIVITIES AND ADLS
PAIN_FUNCTIONAL_ASSESSMENT: ACTIVITIES ARE NOT PREVENTED

## 2022-09-21 ASSESSMENT — PAIN DESCRIPTION - DESCRIPTORS
DESCRIPTORS: ACHING;DISCOMFORT
DESCRIPTORS: ACHING;SORE;STABBING
DESCRIPTORS: DISCOMFORT
DESCRIPTORS: DISCOMFORT

## 2022-09-21 ASSESSMENT — PAIN DESCRIPTION - FREQUENCY
FREQUENCY: CONTINUOUS

## 2022-09-21 ASSESSMENT — PAIN DESCRIPTION - ONSET
ONSET: GRADUAL
ONSET: GRADUAL
ONSET: ON-GOING
ONSET: GRADUAL

## 2022-09-21 ASSESSMENT — PAIN DESCRIPTION - PAIN TYPE
TYPE: CHRONIC PAIN

## 2022-09-21 ASSESSMENT — PAIN DESCRIPTION - ORIENTATION
ORIENTATION: MID;ANTERIOR
ORIENTATION: MID
ORIENTATION: MID

## 2022-09-21 ASSESSMENT — PAIN DESCRIPTION - LOCATION
LOCATION: ABDOMEN

## 2022-09-21 NOTE — PROGRESS NOTES
1537:Phenergan 6.25 mg IM given for c/o nausea & Valencia 5 mg po given for c/o abd pain 6/10.  1605: Pt states the Phenergan did help with her nausea & the Valencia helped with her pain. END OF SHIFT SUMMARY:    I/Os:  +/- this shift:   09/21 0701 - 09/21 1900  In: 650 [I.V.:650]  Out: 325 [Urine:325]      Hold Elliquis for paracentesis on 9/22 @0730. Does not need to be NPO for paracentesis. Urine; dark brown; Nephro consulted for hydronephrosis. Palliative consult now on board  UTI; on rocephin  K+ replacement; po   Port needle & dressing changed today.      Anna Hare RN

## 2022-09-21 NOTE — CONSULTS
Urology Consult      Patient: Reyes Newborn MRN: [id number]  SSN: [id number]    YOB: 1975  Age: 52 y.o. Sex: female      Subjective:      Reyes [place] is a 52 y.o. female who we are consulted on for worsening bilateral hydronephrosis with stents in place. Pt with hx of PE on eliquis and metastatic carcinoma of unknown primary. Presents to ER with hematemesis and admitted by oncology. She Is known to Dr. Yaneth Orantes for bilateral hydro, he placed stents on 22 with plans to replace them around November. Per CT this admission, her bilateral hydro is worse compared to prior imaging. Cr 0.50.  she has no flank pain, UA with >100 RBC, 3-5 WBC, trace bacteria, per nurse urine is brown. Urine culture is in process.       Past Medical History:   Diagnosis Date    Anemia     -- not a problem since hyst    Colon cancer ([facility] dx 2022     plans for chemo--- followed by dr Maria D Velarde    COVID-19 2020    no hospitalization    GERD (gastroesophageal reflux disease)     managed with med    History of colonoscopy 2018    Dr. Margo Elizabeth, nl (see media note), R     Hx of blood clots 2022    per pt \"small clot on lung identified by CT scan\"  CT Scan impression:- Nonobstructive pulmonary filling defect involving Left Lower Lobe     Hypotension     asymptomatic    Obstruction of fallopian tube     per pt has \"1 good tube\"    Peritoneal carcinomatosis ([facility] [address]     Weight loss     80lbs weight loss after gastric sleeve     Past Surgical History:   Procedure Laterality Date     SECTION  2009    CYSTOSCOPY Bilateral 2022    CYSTOSCOPY BILATERAL RETROGRADE PYELOGRAM performed by Debbie Lynne MD at [address] Bilateral 2022    BILATERAL CYSTOSCOPY URETERAL STENT EXCHANGE performed by Debbie Lynne MD at [address]  2014    gastric sleeve- Choudhari    HYSTERECTOMY (CERVIX STATUS UNKNOWN)      2018    HYSTERECTOMY (CERVIX STATUS UNKNOWN)  07/09/2020    TLH w/ Bilateral salpingectomy and left oophorectomy    IR PORT PLACEMENT EQUAL OR GREATER THAN 5 YEARS  2/28/2022    IR PORT PLACEMENT EQUAL OR GREATER THAN 5 YEARS  2/28/2022    IR PORT PLACEMENT EQUAL OR GREATER THAN 5 YEARS 2/28/2022 [facility] RADIOLOG[facility] SPECIALS    LAPAROTOMY N/A 8/17/2022    EXPLORATORY LAPAROTOMY/ ENTEROENTEROSTOMY performed by Marta Emerson MD at [facility]. [address]  age Alessandro Goldman 21s\"    also \"unblocked her FT\"    TONSILLECTOMY      UPPER GASTROINTESTINAL ENDOSCOPY      with dilation    UPPER GASTROINTESTINAL ENDOSCOPY N/A 9/13/2022    EGD ESOPHAGOGASTRODUODENOSCOPY OFL 17 To IR after recovery for Para performed by Lorena Jones MD at [facility] ENDOSCOPY    UROLOGICAL SURGERY Bilateral 03/15/2022    cysto      Family History   Problem Relation Age of Onset    Heart Disease Maternal Grandmother     Hypertension Maternal Grandmother     Heart Disease Maternal Grandfather     Hypertension Maternal Grandfather     Pulmonary Embolism Neg Hx     Colon Cancer Neg Hx     Deep Vein Thrombosis Neg Hx     Ovarian Cancer Neg Hx     Prostate Cancer Neg Hx     Breast Cancer Neg Hx      Social History     Tobacco Use    Smoking status: Never    Smokeless tobacco: Never   Substance Use Topics    Alcohol use: Not Currently      Prior to Admission medications    Medication Sig Start Date End Date Taking? Authorizing Provider   pantoprazole (PROTONIX) 40 MG tablet Take 1 tablet by mouth in the morning and at bedtime 9/16/22  Yes Yves Tom APRN - CNP   docusate sodium (COLACE, DULCOLAX) 100 MG CAPS Take 100 mg by mouth 2 times daily 8/21/22   SCOTT Cristina - CNP   apixaban (ELIQUIS) 5 MG TABS tablet Take 1 tablet by mouth in the morning and 1 tablet before bedtime.  7/18/22 8/17/22  Jordan Don MD   potassium chloride (KLOR-CON M) 20 MEQ extended release tablet Take 1 tablet by mouth 2 times daily 6/9/22   SCOTT Osei - CNP   ergocalciferol (ERGOCALCIFEROL) 1.25 normal renal function. Will notify Dr. Ella Duran as he may want to upsize stents at next exchange. Will need stents changed sooner if she develops flank pain or renal function worsens.     Valentin Khan, DO

## 2022-09-21 NOTE — PROGRESS NOTES
[facility] Hemat[facility] & Oncology        Inpatient Hematology / Oncology Progress Note      Admission Date: 2022  5:06 PM  Reason for Admission/Hospital Course: Hematemesis [K92.0]  Generalized abdominal pain [R10.84]  Intractable vomiting with nausea, unspecified vomiting type [R11.2]      24 Hour Events:  MRCP today  UA/UC ordered  Consult urology   Increase fluids to 125/hr    ROS:  Constitutional: negative for fever, chills. +weakness, malaise fatigue. CV: negative for chest pain, palpitations, edema. Respiratory: negative for dyspnea, cough, wheezing. GI: +nausea/vomiting/ab pain. 10 point review of systems is otherwise negative with the exception of the elements mentioned above in the HPI. No Known Allergies    OBJECTIVE:  Patient Vitals for the past 8 hrs:   BP Temp Temp src Pulse Resp SpO2   22 0756 128/86 99.1 °F (37.3 °C) Oral 94 18 96 %   22 0324 129/85 98.1 °F (36.7 °C) Oral 99 15 96 %     Temp (24hrs), Av.1 °F (36.7 °C), Min:97.4 °F (36.3 °C), Max:99.1 °F (37.3 °C)    No intake/output data recorded. Physical Exam:  Constitutional: Well developed, thin appearing female in no acute distress, sitting comfortably in the hospital bed. HEENT: Normocephalic and atraumatic. Oropharynx is clear, mucous membranes are moist.  Extraocular muscles are intact. Sclerae anicteric. Skin Warm and dry. No bruising and no rash noted. No erythema. Jaundice   Respiratory Lungs are clear to auscultation bilaterally without wheezes, rales or rhonchi, normal air exchange without accessory muscle use. CVS Normal rate, regular rhythm and normal S1 and S2. No murmurs, gallops, or rubs. Abdomen Soft, mildly tender and distended, normoactive bowel sounds. No palpable mass. No hepatosplenomegaly. Neuro Grossly nonfocal with no obvious sensory or motor deficits. MSK Normal range of motion in general.  No edema and no tenderness. Psych Appropriate mood and affect. Labs:      Recent Labs     09/19/22 0315 09/20/22 0312 09/21/22  0430   WBC 7.8 3.9* 4.6   RBC 2.82* 2.87* 2.79*   HGB 8.0* 7.8* 7.6*   HCT 25.1* 25.4* 24.0*   MCV 89.0 88.5 86.0   MCH 28.4 27.2 27.2   MCHC 31.9 30.7* 31.7   RDW 15.6* 15.9* 16.0*    214 223   MPV 10.5 10.5 10.6        Recent Labs     09/19/22 0315 09/20/22 0312 09/21/22  0430    139 137   K 3.3* 3.5 3.1*   * 108 108   CO2 26 26 21   BUN 20 14 9   GFRAA >60 >60 >60   GLOB 3.3 2.5 3.2   MG 2.0 1.6* 1.9         Imaging:  Xray Result (most recent):  XR ABDOMEN (KUB) (SINGLE AP VIEW) 09/17/2022    Narrative  KUB    CLINICAL INDICATION:  Vomiting, foul-smelling emesis, bilateral hydronephrosis  and ureteral stents. History of ascites, peritoneal carcinomatosis, unknown  primary. COMPARISON: CT 9/12/2022, radiography 3/2/2022    TECHNIQUE: AP view of the abdomen supine portable 9:22 AM    FINDINGS:  There are bilateral ureteral stents, similar in position compared  with recent CT. Generalized haziness again noted in keeping with ascites. Nonobstructive bowel gas pattern. Within limits of supine radiography no  evidence of developing free air, pneumatosis, pneumobilia, or osseous changes. Surgical suture material again projects over the stomach. There is a decreased  small amount of retained oral contrast in the GI tract. Lung bases demonstrate  no consolidation. Impression  1. No bowel obstruction evident. 2.  Ascites, bilateral ureteral stents as seen on recent CT. CT Result (most recent):  CT ABDOMEN PELVIS W IV CONTRAST 09/12/2022    Narrative  EXAMINATION: CT  ABDOMEN / PELVIS   9/12/2022 8:06 PM    ACCESSION NUMBER:  [id number]    COMPARISON: 06/22/2022    INDICATION:  abdominal pain    TECHNIQUE: Contiguous axial computed tomographic images were obtained from the  domes of the diaphragm to the symphysis pubis following administration 100 mL  Iso-behzad 370. Coronal reconstructions were also performed. Oral contrast was also  administered. Radiation dose reduction techniques were used for this study. Our CT scanners  use one or all of the following: Automated exposure control, adjustment of the  mA and/or kV according to patient size, iterative reconstruction. FINDINGS:    Lung bases: The visualized lung bases are clear. The visualized portions of the  heart are unremarkable. Liver: Normal    Gallbladder: There is gallbladder wall thickening. The gallbladder is not  distended. Spleen: Normal    Adrenals: Normal    Pancreas: Normal    Kidneys: The kidneys enhance symmetrically. There is moderate to severe  bilateral hydronephrosis with ureteral stents in place. Bladder/: The urinary bladder is somewhat thick-walled. There is previous  hysterectomy. .    Bowel: There is large volume ascites. There is some peritoneal enhancement. There is not peritoneal nodularity. There is some nodularity within the omentum  and mesentery. There is previous gastric sleeve type procedure. The appendix is  normal.    Vessels: Normal    Abdominal wall: Intact    Bones: No suspicious lytic or blastic bony lesions. Impression  Large amount of intraabdominal ascites with mesenteric and peritoneal nodularity  as well as peritoneal enhancement is most concerning for peritoneal  carcinomatosis. Bilateral hydronephrosis with ureteral stents in place. Thick-walled urinary bladder. Correlate with evidence of cystitis. Thick-walled gallbladder is felt likely to be sympathetic. The gallbladder is  not distended.       Medications:  Current Facility-Administered Medications   Medication Dose Route Frequency    metoclopramide (REGLAN) tablet 10 mg  10 mg Oral BID AC    sucralfate (CARAFATE) tablet 1 g  1 g Oral 4 times per day    aluminum & magnesium hydroxide-simethicone (MAALOX) 200-200-20 MG/5ML suspension 30 mL  30 mL Oral Q6H PRN    LORazepam (ATIVAN) injection 1 mg  1 mg IntraVENous Q6H PRN    pantoprazole (PROTONIX) 40 mg in sodium chloride (PF) 10 mL injection  40 mg IntraVENous Q12H    ondansetron (ZOFRAN) injection 4 mg  4 mg IntraVENous Q4H PRN    promethazine (PHENERGAN) injection 6.25 mg  6.25 mg IntraMUSCular Q6H PRN    0.9 % sodium chloride infusion   IntraVENous Continuous    HYDROcodone-acetaminophen (NORCO) 5-325 MG per tablet 1 tablet  1 tablet Oral Q4H PRN    diatrizoate meglumine-sodium (GASTROGRAFIN) 66-10 % solution 15 mL  15 mL Oral ONCE PRN    apixaban (ELIQUIS) tablet 5 mg  5 mg Oral BID    docusate sodium (COLACE) capsule 100 mg  100 mg Oral BID    sodium chloride flush 0.9 % injection 5-40 mL  5-40 mL IntraVENous 2 times per day    sodium chloride flush 0.9 % injection 5-40 mL  5-40 mL IntraVENous PRN    0.9 % sodium chloride infusion   IntraVENous PRN    polyethylene glycol (GLYCOLAX) packet 17 g  17 g Oral Daily PRN    acetaminophen (TYLENOL) tablet 650 mg  650 mg Oral Q6H PRN    Or    acetaminophen (TYLENOL) suppository 650 mg  650 mg Rectal Q6H PRN    potassium chloride (KLOR-CON M) extended release tablet 40 mEq  40 mEq Oral PRN    Or    potassium bicarb-citric acid (EFFER-K) effervescent tablet 40 mEq  40 mEq Oral PRN    Or    potassium chloride 10 mEq/100 mL IVPB (Peripheral Line)  10 mEq IntraVENous PRN    magnesium sulfate 2000 mg in 50 mL IVPB premix  2,000 mg IntraVENous PRN    oxyCODONE (ROXICODONE) immediate release tablet 5 mg  5 mg Oral Q4H PRN     Ms. Emily Murphy is a 52 y.o. female with medical history of PE on Eliquis and metastatic carcinoma of unknown primary followed by Dr. Hailey Branch currently sp cycle 9 FOLFOX on 9/8/2022. She had ex-lap on 8/2022 to eval for debulking but tumor was found to be tightly adherent and impossible to debulk. She presented to ED with 1 day duration of hematemesis. Pt reported she has been feeling abdominal distention that has progressively worsened over 1 week duration associated with mild abdominal discomfort.  She stated that yesterday she vomited up what looked like bloody substance however she was unsure as she has drank red Gatorade earlier in the day. Per notes she had another episode of emesis in ED however at that time was nonbloody. CT AP shows large amount of intra-abdominal ascites with mesenteric/peritoneal nodularity concerning for peritoneal carcinomatosis; bilateral hydronephrosis with ureteral stents in place; thick-walled urinary bladder; thick-walled gallbladder felt to be sympathetic and not distended. IR is consulted for paracentesis with studies ordered. Eliquis is on hold and GI is consulted. PLAN:  CUP now with large volume ascites  -sp cycle 9 FOLFOX on 9/8/2022.  - CT AP shows large amount of intra-abdominal ascites with mesenteric/peritoneal nodularity concerning for peritoneal carcinomatosis; bilateral hydronephrosis with ureteral stents in place; thick-walled urinary bladder; thick-walled gallbladder felt to be sympathetic and not distended.  -Para with studies tomorrow-EGD today  9/14 EGD negative for bleed. Para -3800 studies ordered. Advance diet. 9/15 having nausea-antiemetics  9/16 complaining of acid reflux-added one time IV Pepcid-already on PO protonix bid  9/20 may need another para-follow up on AB US but may need repeat CT to evaluate  9/21 AB US: Cirrhotic liver. Intrahepatic, and borderline extra hepatic biliary ductal dilation. Moderate complex ascites which at times appears septated. Signs or symptoms of infection (i.e. peritonitis) should be excluded. Large bilateral renal stones. In addition, there is moderate right hydronephrosis, and moderate to severe left hydronephrosis of uncertain acuity. Fluid within the dilated left renal pelvis appears complex demonstrating debris. This could be an indicator of blood or infection. Recommend correlation with clinical exam and urinalysis findings. Cholelithiasis and gallbladder wall thickening. Gallbladder wall thickening is nonspecific in the setting of ascites. Signs/symptoms of potential cholecystitis should be excluded. This can be further assessed with a nuclear medicine hepatobiliary scan if clinically indicated. Consult IR for possible para. Urology consulted as well. History of PE  -Eliquis on hold due to hematemesis  9/14 resume Eliquis     Hematemesis / Nausea, vomiting  -PPI BID  -GI planning EGD today  -Hgb 8.9 (11.8)  9/14 hgb stable 9.2  9/17 ongoing vomiting. Emend given yesterday. Increase Zofran to 4mg q 4 hours prn and increase Ativan to 1mg q 6 hours prn. Consider marinol if no improvement. KUB neg. No evidence of bleeding. States she feels like something is stuck in her esophagus. Reconsult GI.  9/18 GI added prn phenergan. Awaiting barium study. GI following. 9/19 Barium esophagram-No evidence of leak or obstruction. Possible inflammation in the proximal jejunum. GI added carafate. We considered adding zyprexa but interacts to reglan. 9/20 taper reglan dt ellevated LFT and bili    Elevated creatinine  9/16 added fluids NS @ 75/hr  9/18 Cr down to 0.7  RESOLVED    UTI  9/16 Noted cystitis on CT. UA on 9/13 c/w UTI - not treated. Repeat UA (c/w UTI). Ucx ordered. Start CTX  9/17 Ucx pending. Con't CTX (D2).  9/18 Ucx-NGTD. Completes CTX today. Elevated LFT and bili  9/20 Liver US ordered    Continue home meds  Andres sOPs  SCDs for DVT ppx    Goals and plan of care reviewed with the patient. All questions answered to the best of our ability. Disposition:  Anticipate discharge home once sx controlled. PT recommends HH at discharge.             Asad Grove, APRN - NP   [facility]  [address] [address]  Office : [phone]  Fax : [phone]

## 2022-09-21 NOTE — PROGRESS NOTES
Physician Progress Note      PATIENT:               Govind Stephenson  CSN #:                  [id number]  :                       1975  ADMIT DATE:       2022 5:06 PM  [address] [place] DATE:  RESPONDING  PROVIDER #:        Caroline Quick NP          QUERY TEXT:    Patient admitted with intractable n/v. Noted documentation of UTI and treated   with Rocephin. In order to support the diagnosis of UTI, please include   additional clinical indicators in your documentation. Or please document if   the diagnosis of UTI has been ruled out after further study. The medical record reflects the following:  Risk Factors: 47yof  Clinical Indicators: UA 1plus bacteria, moderate LE, negative nitrite. Urine   culture showed 10,000 to 50,000 mixed skin alberta  Treatment: [email or web address]  Options provided:  -- UTI present as evidenced by, Please document evidence.   -- UTI was ruled out  -- Other - I will add my own diagnosis  -- Disagree - Not applicable / Not valid  -- Disagree - Clinically unable to determine / Unknown  -- Refer to Clinical Documentation Reviewer    PROVIDER RESPONSE TEXT:    UTI is present as evidenced by UA    Query created by: Dayana Aguilar on 2022 2:50 PM      Electronically signed by:  Caroline Quick NP 2022 10:39 AM

## 2022-09-21 NOTE — PROGRESS NOTES
[facility] Progress Note         Admit Date:  9/12/2022    Today's Date:  9/21/2022    CC: Re-consult for intractable N/V, elevated LFT's    Subjective:     Patient has epigastric pain, receiving pain medication PRN with relief. She denies vomiting. Awaiting MRCP.      Medications:   Current Facility-Administered Medications   Medication Dose Route Frequency    metoclopramide (REGLAN) tablet 10 mg  10 mg Oral BID AC    sucralfate (CARAFATE) tablet 1 g  1 g Oral 4 times per day    aluminum & magnesium hydroxide-simethicone (MAALOX) 200-200-20 MG/5ML suspension 30 mL  30 mL Oral Q6H PRN    LORazepam (ATIVAN) injection 1 mg  1 mg IntraVENous Q6H PRN    pantoprazole (PROTONIX) 40 mg in sodium chloride (PF) 10 mL injection  40 mg IntraVENous Q12H    ondansetron (ZOFRAN) injection 4 mg  4 mg IntraVENous Q4H PRN    promethazine (PHENERGAN) injection 6.25 mg  6.25 mg IntraMUSCular Q6H PRN    0.9 % sodium chloride infusion   IntraVENous Continuous    HYDROcodone-acetaminophen (NORCO) 5-325 MG per tablet 1 tablet  1 tablet Oral Q4H PRN    diatrizoate meglumine-sodium (GASTROGRAFIN) 66-10 % solution 15 mL  15 mL Oral ONCE PRN    apixaban (ELIQUIS) tablet 5 mg  5 mg Oral BID    docusate sodium (COLACE) capsule 100 mg  100 mg Oral BID    sodium chloride flush 0.9 % injection 5-40 mL  5-40 mL IntraVENous 2 times per day    sodium chloride flush 0.9 % injection 5-40 mL  5-40 mL IntraVENous PRN    0.9 % sodium chloride infusion   IntraVENous PRN    polyethylene glycol (GLYCOLAX) packet 17 g  17 g Oral Daily PRN    acetaminophen (TYLENOL) tablet 650 mg  650 mg Oral Q6H PRN    Or    acetaminophen (TYLENOL) suppository 650 mg  650 mg Rectal Q6H PRN    potassium chloride (KLOR-CON M) extended release tablet 40 mEq  40 mEq Oral PRN    Or    potassium bicarb-citric acid (EFFER-K) effervescent tablet 40 mEq  40 mEq Oral PRN    Or    potassium chloride 10 mEq/100 mL IVPB (Peripheral Line)  10 mEq IntraVENous PRN magnesium sulfate 2000 mg in 50 mL IVPB premix  2,000 mg IntraVENous PRN    oxyCODONE (ROXICODONE) immediate release tablet 5 mg  5 mg Oral Q4H PRN       Review of Systems:  ROS was obtained, with pertinent positives as listed above. No chest pain or SOB. Diet:  Full liquid, Nutrition supplements    Objective:   Vitals:  /86   Pulse 94   Temp 99.1 °F (37.3 °C) (Oral)   Resp 18   Ht 5' 5\" (1.651 m)   Wt 112 lb (50.8 kg)   SpO2 96%   BMI 18.64 kg/m²   Intake/Output:  No intake/output data recorded. 09/19 1901 - 09/21 0700  In: 2328.2 [P.O.:40; I.V.:2288.2]  Out: 250 [Urine:250]  Exam:  General appearance: alert, cooperative, no distress, lying in bed. Right chest PAC. Lungs: clear to auscultation bilaterally anteriorly  Heart: regular rate and rhythm  Abdomen: firm, non tender, Bowel sounds normal. No masses, no organomegaly  Extremities: extremities normal, atraumatic, no cyanosis or edema  Neuro:  alert and oriented    Data Review (Labs):    Recent Labs     09/19/22  0315 09/20/22  0312 09/21/22  0430   WBC 7.8 3.9* 4.6   HGB 8.0* 7.8* 7.6*   HCT 25.1* 25.4* 24.0*    214 223   MCV 89.0 88.5 86.0    139 137   K 3.3* 3.5 3.1*   * 108 108   CO2 26 26 21   BUN 20 14 9   MG 2.0 1.6* 1.9   AST 42* 331* 180*   ALT 27 184* 156*      Latest Reference Range & Units 9/21/22 04:30   Albumin 3.5 - 5.0 g/dL 1.7 (L)   Globulin 2.3 - 3.5 g/dL 3.2   ALBUMIN/GLOBULIN RATIO 1.2 - 3.5   0.5 (L)   Alk Phosphatase 50 - 136 U/L 332 (H)   ALT 12 - 65 U/L 156 (H)   AST 15 - 37 U/L 180 (H)   Bilirubin 0.2 - 1.1 MG/DL 2.8 (H)   Total Protein 6.3 - 8.2 g/dL 4.9 (L)   (L): Data is abnormally low  (H): Data is abnormally high    Abdominal US 9/20/22  1. Cirrhotic liver. 2.  Intrahepatic, and borderline extra hepatic biliary ductal dilation. This can   be further assessed with MRCP or ERCP. 3.  Moderate complex ascites which at times appears septated.  Signs or symptoms   of infection (i.e. peritonitis) should be excluded. 4. Large bilateral renal stones. In addition, there is moderate right   hydronephrosis, and moderate to severe left hydronephrosis of uncertain acuity. Fluid within the dilated left renal pelvis appears complex demonstrating debris. This could be an indicator of blood or infection. Recommend correlation with   clinical exam and urinalysis findings. 5. Cholelithiasis and gallbladder wall thickening. Gallbladder wall thickening   is nonspecific in the setting of ascites. Signs/symptoms of potential   cholecystitis should be excluded. This can be further assessed with a nuclear   medicine hepatobiliary scan if clinically indicated. Barium swallow 9/18/22 revealed   1. No evidence of leak or obstruction. 2.  Possible inflammation in the proximal jejunum. Assessment:     Principal Problem:    Hematemesis  Active Problems:    Bilateral hydronephrosis    Peritoneal carcinomatosis (HCC)    Current use of long term anticoagulation    Intractable vomiting    Generalized abdominal pain    LFT elevation    Carcinoma of unknown primary ([address]  Resolved Problems:    * No resolved hospital problems. *    46 y.o. female with PMH including but not limited to metastatic carcinoma of unknown primary source dx 2/2022- on chemo (last dose 9/10/22), exlap 8/2022 for debulking of tumor (unsuccessful- tightly adherent) followed by Dr. Twyla Sam PE on Eliquis  since 6/2022 who was seen in RE-consultation at the request of Piedad Acevedo NP for intractable N/V with dysphagia. Barium swallow negative for leak or obstruction. Elevated LFT's noted on 9/20/22. Abdominal US revealed dilated intra and extrahepatic ducts. MRCP ordered, awaiting completion. Plan:     - Supportive care, maintain electrolytes, antiemetics  - Continue Protonix BID   - Continue Carafate 1 g QID   - Full liquid diet. Nutritional supplements advised.    - MRCP pending       SCOTT Payan  [facility]      Patient is seen and examined in collaboration with Dr. Ada Armas. Assessment and plan as per Dr. Constantino Ge.

## 2022-09-21 NOTE — PLAN OF CARE
Problem: Discharge Planning  Goal: Discharge to home or other facility with appropriate resources  9/21/2022 1202 by Linda Rosenberg RN  Outcome: Progressing  9/20/2022 2230 by Rubia Francis RN  Outcome: Progressing  Flowsheets (Taken 9/20/2022 2033)  Discharge to home or other facility with appropriate resources:   Identify barriers to discharge with patient and caregiver   Arrange for needed discharge resources and transportation as appropriate   Identify discharge learning needs (meds, wound care, etc)     Problem: Safety - Adult  Goal: Free from fall injury  9/21/2022 1202 by Linda Rosenberg RN  Outcome: Progressing  9/20/2022 2230 by Rubia Francis RN  Outcome: Progressing     Problem: Skin/Tissue Integrity  Goal: Absence of new skin breakdown  Description: 1. Monitor for areas of redness and/or skin breakdown  2. Assess vascular access sites hourly  3. Every 4-6 hours minimum:  Change oxygen saturation probe site  4. Every 4-6 hours:  If on nasal continuous positive airway pressure, respiratory therapy assess nares and determine need for appliance change or resting period.   9/21/2022 1202 by Linda Rosenberg RN  Outcome: Progressing  9/20/2022 2230 by Rubia Francis RN  Outcome: Progressing     Problem: ABCDS Injury Assessment  Goal: Absence of physical injury  9/21/2022 1202 by Linda Rosenberg RN  Outcome: Progressing  9/20/2022 2230 by Rubia Francis RN  Outcome: Progressing

## 2022-09-21 NOTE — PROGRESS NOTES
Comprehensive Nutrition Assessment    Type and Reason for Visit: Reassess  Poor Intake/Appetite 5 or More Days (Oncology)    Nutrition Recommendations/Plan:   Meals and Snacks:  Diet: Continue current diet and advance as medically appropriate. Currently being held NPO  Nutrition Supplement Therapy:  Medical food supplement therapy:  Change Ensure High Protein three times per day (this provides 160 kcal and 16 grams protein per bottle)     Malnutrition Assessment:  Malnutrition Status: At risk for malnutrition (Comment) (+ wt loss but stable over last ~3 months, acute N/V with new ascites and possible carcinomatosis)       Nutrition Assessment:  Nutrition History: Patient states tolerating usual diet until ~1 week prior to admission. She states that she eats 3 smaller meals per day. She states she has been tolerating regular foods. She states that she has not tried Ensure or Boost. She endorses weight loss but states she has been stable over the last ~3 months (consistent with outpt office weights). She is thin appearing but good muscle retention when palpated. Do You Have Any Cultural, Orthodox, or Ethnic Food Preferences?: Yes (Comment)   Nutrition Background:       Patient with metastatic cancer of unknown primary (dx 2/2022) on chemo with last cycle 9/8/22, recent ex lap to evaluate for possible debulking but unable. She presented with progressive nausea and vomiting (possibly blood) and progressive abdominal distention. She was found to have ascites and mesenteric peritoneal nodularity. She is s/p paracentesis and EGD 9/13. Paracentesis with 3.8 L removed, EGD with tiny Sussy Zamorano tear but no active bleeding. S/p barium esophagram 9/19 with possible jejunitis. Nutrition Interval:  Diet progression: 9/12 NPO; 9/13 CLD; 9/14 Regular diet; 9/15 \"no pork\" added; 9/17 NPO; 9/19 FLD + Ensure Enlive TID  Patient seen reclined in bed. Currently holding NPO for MRCP today.  She states she thinks she could manage a yogurt today but overall not hungry. When RD reviewing intake since last assessment she agrees and states that she is just not hungry in general. She states that she tried to drink Ensure but vomited after only half. Discussed trying a lower fat formula to see if she tolerates better and she agrees. Discussed trying to take at least bites of each meal and sipping Ensure between meals, but do not try to drink significant volume at one time. She voiced understanding and denies needs or questions. Current Nutrition Therapies:  ADULT DIET; Full Liquid; No pork  ADULT ORAL NUTRITION SUPPLEMENT; Breakfast, Lunch, Dinner; Standard High Calorie/High Protein Oral Supplement    Current Intake:   Average Meal Intake:  (1-25%, now holding NPO) Average Supplements Intake: Refusing to take      Anthropometric Measures:  Height: 5' 5\" (165.1 cm)  Current Body Wt: 111 lb 15.9 oz (50.8 kg) (9/14), Weight source: Stated  BMI: 18.6, Normal Weight (BMI 18.5-24. 9)  Admission Body Weight: 111 lb 15.9 oz (50.8 kg) (9/12 stated)  Ideal Body Weight (Kg) (Calculated): 57 kg (125 lbs), 89.6 %  Usual Body Wt: 136 lb (61.7 kg) (10/19/21 office weight), Percent weight change: -17.7       Edema:    BMI Category Normal Weight (BMI 18.5-24. 9)  Estimated Daily Nutrient Needs:  Energy (kcal/day): 7456-8225 (Kcal/kg (30-35) Weight used: 50.8 kg Current  Protein (g/day): 61-66 (1.2-1.3 g/kg) Weight Used: (Current) 50.8 kg  Fluid (ml/day):   (1 ml/kcal)    Nutrition Diagnosis:   Inadequate oral intake related to altered GI function as evidenced by nausea, vomiting (PO intake as above)  Nutrition Interventions:   Food and/or Nutrient Delivery: Continue Current Diet, Modify Oral Nutrition Supplement     Coordination of Nutrition Care: Continue to monitor while inpatient       Goals:   Previous Goal Met: No Progress toward Goal(s)  Active Goal: PO intake 50% or greater       Nutrition Monitoring and Evaluation:      Food/Nutrient Intake Outcomes: Diet Advancement/Tolerance, Food and Nutrient Intake, Supplement Intake  Physical Signs/Symptoms Outcomes: Nausea or Vomiting, Meal Time Behavior, Weight    Discharge Planning:     Too soon to determine    GIGI GOODRICH, RD

## 2022-09-21 NOTE — CONSULTS
Patient: Alexandria Scott MRN: [id number]  SSN: [id number]    YOB: 1975  Age: 52 y.o. Sex: female       Date of Request: 9/21/2022  Date of Consult:  9/21/2022  Reason for Consult:  pain and symptom management  Requesting Physician: Dr. Turner Lindsay     Assessment/Plan:     Principal Diagnosis:    Pain, abdomen  R10.9    Additional Diagnoses:   Frailty  R54  Nausea/Vomiting  R11.2  Counseling, Encounter for Medical Advice  Z71.9  Encounter for Palliative Care  Z51.5    Palliative Performance Scale (PPS):       Medical Decision Making:   Reviewed and summarized labs and imaging from admission. Met with pt at bedside. Plans for MRCP today per GI notes. Pt notes general abdominal pain and some nausea. Pain improved some with oxycodone however is needing med about every 4 hours. Might benefit from long acting meds. Will await results of procedure and pt to resume po intake before starting meds. Will follow    Will discuss findings with members of the interdisciplinary team.      Thank you for this referral.         Subjective:     History obtained from:  Patient and Chart    Chief Complaint: abdominal pain  History of Present Illness:  52 y.o. female with medical history of PE on Eliquis and metastatic carcinoma of unknown primary followed by Dr. Carmina Jin currently sp cycle 9 FOLFOX on 9/8/2022. She had ex-lap on 8/2022 to eval for debulking but tumor was found to be tightly adherent and impossible to debulk. She presented to ED with 1 day duration of hematemesis. Pt reported she has been feeling abdominal distention that has progressively worsened over 1 week duration associated with mild abdominal discomfort. She stated that yesterday she vomited up what looked like bloody substance however she was unsure as she has drank red Gatorade earlier in the day. Per notes she had another episode of emesis in ED however at that time was nonbloody.  CT AP shows large amount of intra-abdominal ascites with mesenteric/peritoneal nodularity concerning for peritoneal carcinomatosis; bilateral hydronephrosis with ureteral stents in place; thick-walled urinary bladder; thick-walled gallbladder felt to be sympathetic and not distended. IR is consulted for paracentesis with studies ordered. Eliquis is on hold and GI is consulted. Advance Directive: No       Code Status:  Full Code            Health Care Power of : No - Patient does not have a [address]     Past Medical History:   Diagnosis Date    Anemia     -- not a problem since hyst    Colon cancer ([address] dx 2022     plans for chemo--- followed by dr Eran EL-19 2020    no hospitalization    GERD (gastroesophageal reflux disease)     managed with med    History of colonoscopy 2018    louis Byrd (see media note), R     Hx of blood clots 2022    per pt \"small clot on lung identified by CT scan\"  CT Scan impression:- Nonobstructive pulmonary filling defect involving Left Lower Lobe     Hypotension     asymptomatic    Obstruction of fallopian tube     per pt has \"1 good tube\"    Peritoneal carcinomatosis ([address]     Weight loss     80lbs weight loss after gastric sleeve      Past Surgical History:   Procedure Laterality Date     SECTION  2009    CYSTOSCOPY Bilateral 2022    CYSTOSCOPY BILATERAL RETROGRADE PYELOGRAM performed by Timoteo Salomon MD at [address] Bilateral 2022    BILATERAL CYSTOSCOPY URETERAL STENT EXCHANGE performed by Timoteo Salomon MD at [address]  2014    gastric sleeve- Choudhari    HYSTERECTOMY (CERVIX STATUS UNKNOWN)      2018    HYSTERECTOMY (CERVIX STATUS UNKNOWN)  2020    TLH w/ Bilateral salpingectomy and left oophorectomy    IR PORT PLACEMENT EQUAL OR GREATER THAN 5 YEARS  2022    IR PORT PLACEMENT EQUAL OR GREATER THAN 5 YEARS  2022    IR PORT PLACEMENT EQUAL OR GREATER THAN 5 YEARS 2022 [facility] SPECI[facility]    LAPAROTOMY N/A 8/17/2022    EXPLORATORY LAPAROTOMY/ ENTEROENTEROSTOMY performed by Klarissa Amaro MD at [facility]. [address]  age Judy Blend 21s\"    also \"unblocked her FT\"    TONSILLECTOMY      UPPER GASTROINTESTINAL ENDOSCOPY      with dilation    UPPER GASTROINTESTINAL ENDOSCOPY N/A 9/13/2022    EGD ESOPHAGOGASTRODUODENOSCOPY OFL 17 To IR after recovery for Para performed by Rebecca Sher MD at [facility] ENDOSCOPY    UROLOGICAL SURGERY Bilateral 03/15/2022    cysto     Family History   Problem Relation Age of Onset    Heart Disease Maternal Grandmother     Hypertension Maternal Grandmother     Heart Disease Maternal Grandfather     Hypertension Maternal Grandfather     Pulmonary Embolism Neg Hx     Colon Cancer Neg Hx     Deep Vein Thrombosis Neg Hx     Ovarian Cancer Neg Hx     Prostate Cancer Neg Hx     Breast Cancer Neg Hx       Social History     Tobacco Use    Smoking status: Never    Smokeless tobacco: Never   Substance Use Topics    Alcohol use: Not Currently     Prior to Admission medications    Medication Sig Start Date End Date Taking? Authorizing Provider   pantoprazole (PROTONIX) 40 MG tablet Take 1 tablet by mouth in the morning and at bedtime 9/16/22  Yes SCOTT Chapman CNP   docusate sodium (COLACE, DULCOLAX) 100 MG CAPS Take 100 mg by mouth 2 times daily 8/21/22   SCOTT Potts CNP   apixaban (ELIQUIS) 5 MG TABS tablet Take 1 tablet by mouth in the morning and 1 tablet before bedtime.  7/18/22 8/17/22  David Suarez MD   potassium chloride (KLOR-CON M) 20 MEQ extended release tablet Take 1 tablet by mouth 2 times daily 6/9/22   SCOTT Resendiz CNP   ergocalciferol (ERGOCALCIFEROL) 1.25 MG (75165 UT) capsule Take 50,000 Units by mouth every 7 days Takes on Thursday 10/19/21   Ar Automatic Reconciliation   lidocaine-prilocaine (EMLA) 2.5-2.5 % cream Apply to port about 45 minutes prior to access 3/4/22   Ar Automatic Reconciliation   metoclopramide (REGLAN) 10 MG tablet Take 10 mg by mouth 4 times daily (before meals and nightly) 3/31/22 6/23/22  Ar Automatic Reconciliation   ondansetron (ZOFRAN) 8 MG tablet Take 8 mg by mouth every 8 hours as needed 3/4/22   Ar Automatic Reconciliation   valACYclovir (VALTREX) 500 MG tablet Take 500 mg by mouth daily 9/1/21   Ar Automatic Reconciliation       No Known Allergies     Comprehensive review of systems completed and negative with exception of noted above     Objective:     Visit Vitals  /86   Pulse 94   Temp 99.1 °F (37.3 °C) (Oral)   Resp 18   Ht 5' 5\" (1.651 m)   Wt 112 lb (50.8 kg)   SpO2 96%   BMI 18.64 kg/m²        Physical Exam:    General:  Cooperative. No acute distress. Eyes:  Conjunctivae/corneas clear    Nose: Nares normal. Septum midline. Neck: Supple, symmetrical, trachea midline, no JVD   Lungs:   Clear to auscultation bilaterally, unlabored   Heart:  Regular rate and rhythm, no murmur    Abdomen:   Soft, tender to palp non-distended. Positive bowel sounds   Extremities: Normal, atraumatic, no cyanosis or edema   Skin: Skin color, texture, turgor normal. No rash or lesions.    Neurologic: Nonfocal   Psych: Alert and oriented       Signed By: Ashlee Dsouza MD     September 21, 2022

## 2022-09-21 NOTE — PROGRESS NOTES
PHYSICAL THERAPY: Daily Note PM   (Link to Caseload Tracking: PT Visit Days : 3  Time In/Out PT Charge Capture  Rehab Caseload Tracker  Orders    Marcella Pulido is a 52 y.o. female   PRIMARY DIAGNOSIS: Hematemesis  Hematemesis [K92.0]  Generalized abdominal pain [R10.84]  Intractable vomiting with nausea, unspecified vomiting type [R11.2]  Procedure(s) (LRB):  EGD ESOPHAGOGASTRODUODENOSCOPY OFL 17 To IR after recovery for Para (N/A)  8 Days Post-Op  Inpatient: Payor: Kalli Berrios 150 / Plan: [employer] [place] / Product Type: *No Product type* /     ASSESSMENT:     REHAB RECOMMENDATIONS:   Recommendation to date pending progress:  Setting:  Home Health Therapy    Equipment:    To Be Determined     ASSESSMENT:  Ms. Eliane Garcia was supine upon arrival and agreeable to therapy. Patient performed bed mobility and transfers with supervision. Patient then ambulated 150' pushing IV pole with slightly unsteady gait at times but no true LOB noted. Patient returned to EOB and to supine with supervision. Steady progress. No goals have been met thus far. Will continue efforts.       SUBJECTIVE:   Ms. Eliane Garcia states, \"Everyone\"     Social/Functional    OBJECTIVE:     PAIN: Yohana Whitfield / O2: PRECAUTION / Ana Sotelo / [employer]:   Pre Treatment:   Pain Level: 0      Post Treatment: 0 Vitals        Oxygen    IV and Purewick    RESTRICTIONS/PRECAUTIONS:        MOBILITY: I Mod I S SBA CGA Min Mod Max Total  NT x2 Comments:   Bed Mobility    Rolling [] [] [] [] [] [] [] [] [] [] []    Supine to Sit [] [] [x] [] [] [] [] [] [] [] []    Scooting [] [] [] [] [] [] [] [] [] [] []    Sit to Supine [] [] [x] [] [] [] [] [] [] [] []    Transfers    Sit to Stand [] [] [x] [] [] [] [] [] [] [] []    Bed to Chair [] [] [] [] [] [] [] [] [] [x] []    Stand to Sit [] [] [x] [] [] [] [] [] [] [] []     [] [] [] [] [] [] [] [] [] [] []    I=Independent, Mod I=Modified Independent, S=Supervision, SBA=Standby Assistance, CGA=Contact Guard Assistance,   Min=Minimal Assistance, Mod=Moderate Assistance, Max=Maximal Assistance, Total=Total Assistance, NT=Not Tested    BALANCE: Good Fair+ Fair Fair- Poor NT Comments   Sitting Static [x] [] [] [] [] []    Sitting Dynamic [] [x] [] [] [] []              Standing Static [] [x] [] [] [] []    Standing Dynamic [] [] [x] [] [] []      GAIT: I Mod I S SBA CGA Min Mod Max Total  NT x2 Comments:   Level of Assistance [] [] [] [x] [] [] [] [] [] [] []    Distance   150 feet    DME Pushing IV pole    Gait Quality Decreased step clearance and Decreased step length    Weightbearing Status      Stairs      I=Independent, Mod I=Modified Independent, S=Supervision, SBA=Standby Assistance, CGA=Contact Guard Assistance,   Min=Minimal Assistance, Mod=Moderate Assistance, Max=Maximal Assistance, Total=Total Assistance, NT=Not Tested    PLAN:   ACUTE PHYSICAL THERAPY GOALS:   (Developed with and agreed upon by patient and/or caregiver.)  Pt will perform all transfers Mod (I) without LOB/ miss-steps in 7 therapy sessions. Pt will ambulate 250 ft Mod (I) with use of LRAD and breaks as needed in 7 therapy sessions. Pt will perform standing dynamic balance activities with minimal postural sway in 7 therapy sessions. Pt will tolerate multiple sets and reps of BLE exercises in 7 therapy sessions. FREQUENCY AND DURATION:   for duration of hospital stay or until stated goals are met, whichever comes first.    TREATMENT:   TREATMENT:   Therapeutic Activity (8 Minutes): Therapeutic activity included Supine to Sit, Sit to Supine, Scooting, Transfer Training, Ambulation on level ground, Sitting balance , and Standing balance to improve functional Activity tolerance, Balance, Coordination, and Mobility.     TREATMENT GRID:  N/A    AFTER TREATMENT PRECAUTIONS: Bed, Bed/Chair Locked, Call light within reach, Needs within reach, RN notified, and Side rails x3    INTERDISCIPLINARY COLLABORATION:  RN/ PCT and PT/ PTA    EDUCATION:      TIME IN/OUT:  Time In: 1000  Time Out: [address]: [address]

## 2022-09-22 ENCOUNTER — APPOINTMENT (OUTPATIENT)
Dept: INTERVENTIONAL RADIOLOGY/VASCULAR | Age: 47
DRG: 374 | End: 2022-09-22
Payer: COMMERCIAL

## 2022-09-22 LAB
ALBUMIN SERPL-MCNC: 1.8 G/DL (ref 3.5–5)
ALBUMIN/GLOB SERPL: 0.6 {RATIO} (ref 1.2–3.5)
ALP SERPL-CCNC: 255 U/L (ref 50–136)
ALT SERPL-CCNC: 102 U/L (ref 12–65)
ANION GAP SERPL CALC-SCNC: 9 MMOL/L (ref 4–13)
AST SERPL-CCNC: 61 U/L (ref 15–37)
BASOPHILS # BLD: 0 K/UL (ref 0–0.2)
BASOPHILS NFR BLD: 0 % (ref 0–2)
BILIRUB SERPL-MCNC: 1.1 MG/DL (ref 0.2–1.1)
BUN SERPL-MCNC: 11 MG/DL (ref 6–23)
CALCIUM SERPL-MCNC: 7.9 MG/DL (ref 8.3–10.4)
CHLORIDE SERPL-SCNC: 109 MMOL/L (ref 101–110)
CO2 SERPL-SCNC: 21 MMOL/L (ref 21–32)
CREAT SERPL-MCNC: 0.6 MG/DL (ref 0.6–1)
DIFFERENTIAL METHOD BLD: ABNORMAL
EOSINOPHIL # BLD: 0 K/UL (ref 0–0.8)
EOSINOPHIL NFR BLD: 0 % (ref 0.5–7.8)
ERYTHROCYTE [DISTWIDTH] IN BLOOD BY AUTOMATED COUNT: 16.2 % (ref 11.9–14.6)
GLOBULIN SER CALC-MCNC: 3.2 G/DL (ref 2.3–3.5)
GLUCOSE SERPL-MCNC: 75 MG/DL (ref 65–100)
HCT VFR BLD AUTO: 23.4 % (ref 35.8–46.3)
HGB BLD-MCNC: 7.4 G/DL (ref 11.7–15.4)
IMM GRANULOCYTES # BLD AUTO: 0 K/UL (ref 0–0.5)
IMM GRANULOCYTES NFR BLD AUTO: 1 % (ref 0–5)
LYMPHOCYTES # BLD: 0.6 K/UL (ref 0.5–4.6)
LYMPHOCYTES NFR BLD: 14 % (ref 13–44)
MAGNESIUM SERPL-MCNC: 1.6 MG/DL (ref 1.8–2.4)
MCH RBC QN AUTO: 27.5 PG (ref 26.1–32.9)
MCHC RBC AUTO-ENTMCNC: 31.6 G/DL (ref 31.4–35)
MCV RBC AUTO: 87 FL (ref 79.6–97.8)
MONOCYTES # BLD: 1 K/UL (ref 0.1–1.3)
MONOCYTES NFR BLD: 22 % (ref 4–12)
NEUTS SEG # BLD: 2.7 K/UL (ref 1.7–8.2)
NEUTS SEG NFR BLD: 63 % (ref 43–78)
NRBC # BLD: 0 K/UL (ref 0–0.2)
PLATELET # BLD AUTO: 260 K/UL (ref 150–450)
PMV BLD AUTO: 11.2 FL (ref 9.4–12.3)
POTASSIUM SERPL-SCNC: 3.6 MMOL/L (ref 3.5–5.1)
PROT SERPL-MCNC: 5 G/DL (ref 6.3–8.2)
RBC # BLD AUTO: 2.69 M/UL (ref 4.05–5.2)
SODIUM SERPL-SCNC: 139 MMOL/L (ref 136–145)
WBC # BLD AUTO: 4.3 K/UL (ref 4.3–11.1)

## 2022-09-22 PROCEDURE — 6360000002 HC RX W HCPCS: Performed by: INTERNAL MEDICINE

## 2022-09-22 PROCEDURE — 83735 ASSAY OF MAGNESIUM: CPT

## 2022-09-22 PROCEDURE — 2580000003 HC RX 258: Performed by: NURSE PRACTITIONER

## 2022-09-22 PROCEDURE — 1100000000 HC RM PRIVATE

## 2022-09-22 PROCEDURE — 0W9G3ZZ DRAINAGE OF PERITONEAL CAVITY, PERCUTANEOUS APPROACH: ICD-10-PCS | Performed by: RADIOLOGY

## 2022-09-22 PROCEDURE — 80053 COMPREHEN METABOLIC PANEL: CPT

## 2022-09-22 PROCEDURE — 99233 SBSQ HOSP IP/OBS HIGH 50: CPT | Performed by: INTERNAL MEDICINE

## 2022-09-22 PROCEDURE — 85025 COMPLETE CBC W/AUTO DIFF WBC: CPT

## 2022-09-22 PROCEDURE — 6360000002 HC RX W HCPCS: Performed by: NURSE PRACTITIONER

## 2022-09-22 PROCEDURE — 6360000002 HC RX W HCPCS: Performed by: FAMILY MEDICINE

## 2022-09-22 PROCEDURE — 6370000000 HC RX 637 (ALT 250 FOR IP): Performed by: FAMILY MEDICINE

## 2022-09-22 PROCEDURE — 2580000003 HC RX 258: Performed by: FAMILY MEDICINE

## 2022-09-22 PROCEDURE — A4216 STERILE WATER/SALINE, 10 ML: HCPCS | Performed by: NURSE PRACTITIONER

## 2022-09-22 PROCEDURE — 36591 DRAW BLOOD OFF VENOUS DEVICE: CPT

## 2022-09-22 PROCEDURE — 6370000000 HC RX 637 (ALT 250 FOR IP): Performed by: NURSE PRACTITIONER

## 2022-09-22 PROCEDURE — C9113 INJ PANTOPRAZOLE SODIUM, VIA: HCPCS | Performed by: NURSE PRACTITIONER

## 2022-09-22 PROCEDURE — 2500000003 HC RX 250 WO HCPCS: Performed by: RADIOLOGY

## 2022-09-22 PROCEDURE — 97530 THERAPEUTIC ACTIVITIES: CPT

## 2022-09-22 PROCEDURE — 49083 ABD PARACENTESIS W/IMAGING: CPT

## 2022-09-22 PROCEDURE — 6370000000 HC RX 637 (ALT 250 FOR IP): Performed by: INTERNAL MEDICINE

## 2022-09-22 PROCEDURE — 2580000003 HC RX 258: Performed by: INTERNAL MEDICINE

## 2022-09-22 RX ORDER — MORPHINE SULFATE 15 MG/1
15 TABLET, FILM COATED, EXTENDED RELEASE ORAL EVERY 12 HOURS SCHEDULED
Status: DISCONTINUED | OUTPATIENT
Start: 2022-09-22 | End: 2022-09-22

## 2022-09-22 RX ORDER — SODIUM CHLORIDE 0.9 % (FLUSH) 0.9 %
10 SYRINGE (ML) INJECTION PRN
Status: CANCELLED | OUTPATIENT
Start: 2022-09-22

## 2022-09-22 RX ORDER — MORPHINE SULFATE 15 MG/1
15 TABLET, FILM COATED, EXTENDED RELEASE ORAL EVERY 12 HOURS SCHEDULED
Status: DISCONTINUED | OUTPATIENT
Start: 2022-09-22 | End: 2022-09-27 | Stop reason: HOSPADM

## 2022-09-22 RX ORDER — LIDOCAINE HYDROCHLORIDE 20 MG/ML
INJECTION, SOLUTION INFILTRATION; PERINEURAL
Status: COMPLETED | OUTPATIENT
Start: 2022-09-22 | End: 2022-09-22

## 2022-09-22 RX ADMIN — APIXABAN 5 MG: 5 TABLET, FILM COATED ORAL at 20:28

## 2022-09-22 RX ADMIN — SUCRALFATE 1 G: 1 TABLET ORAL at 05:21

## 2022-09-22 RX ADMIN — ONDANSETRON 4 MG: 2 INJECTION INTRAMUSCULAR; INTRAVENOUS at 15:56

## 2022-09-22 RX ADMIN — HYDROCODONE BITARTRATE AND ACETAMINOPHEN 1 TABLET: 5; 325 TABLET ORAL at 00:18

## 2022-09-22 RX ADMIN — ONDANSETRON 4 MG: 2 INJECTION INTRAMUSCULAR; INTRAVENOUS at 20:27

## 2022-09-22 RX ADMIN — OXYCODONE 5 MG: 5 TABLET ORAL at 07:41

## 2022-09-22 RX ADMIN — OXYCODONE 5 MG: 5 TABLET ORAL at 03:33

## 2022-09-22 RX ADMIN — OXYCODONE 5 MG: 5 TABLET ORAL at 18:40

## 2022-09-22 RX ADMIN — SODIUM CHLORIDE, PRESERVATIVE FREE 10 ML: 5 INJECTION INTRAVENOUS at 20:30

## 2022-09-22 RX ADMIN — ONDANSETRON 4 MG: 2 INJECTION INTRAMUSCULAR; INTRAVENOUS at 00:13

## 2022-09-22 RX ADMIN — SODIUM CHLORIDE, PRESERVATIVE FREE 40 MG: 5 INJECTION INTRAVENOUS at 07:43

## 2022-09-22 RX ADMIN — HYDROCODONE BITARTRATE AND ACETAMINOPHEN 1 TABLET: 5; 325 TABLET ORAL at 10:11

## 2022-09-22 RX ADMIN — ALTEPLASE 1 MG: 2.2 INJECTION, POWDER, LYOPHILIZED, FOR SOLUTION INTRAVENOUS at 03:33

## 2022-09-22 RX ADMIN — MORPHINE SULFATE 15 MG: 15 TABLET, FILM COATED, EXTENDED RELEASE ORAL at 12:40

## 2022-09-22 RX ADMIN — SODIUM CHLORIDE: 9 INJECTION, SOLUTION INTRAVENOUS at 15:50

## 2022-09-22 RX ADMIN — METOCLOPRAMIDE 10 MG: 10 TABLET ORAL at 07:41

## 2022-09-22 RX ADMIN — MAGNESIUM SULFATE HEPTAHYDRATE 2000 MG: 40 INJECTION, SOLUTION INTRAVENOUS at 05:54

## 2022-09-22 RX ADMIN — METOCLOPRAMIDE 10 MG: 10 TABLET ORAL at 15:50

## 2022-09-22 RX ADMIN — SUCRALFATE 1 G: 1 TABLET ORAL at 00:13

## 2022-09-22 RX ADMIN — SODIUM CHLORIDE: 9 INJECTION, SOLUTION INTRAVENOUS at 02:55

## 2022-09-22 RX ADMIN — DOCUSATE SODIUM 100 MG: 100 CAPSULE, LIQUID FILLED ORAL at 07:41

## 2022-09-22 RX ADMIN — SODIUM CHLORIDE, PRESERVATIVE FREE 10 ML: 5 INJECTION INTRAVENOUS at 07:44

## 2022-09-22 RX ADMIN — LIDOCAINE HYDROCHLORIDE 200 MG: 20 INJECTION, SOLUTION INFILTRATION; PERINEURAL at 09:24

## 2022-09-22 RX ADMIN — ONDANSETRON 4 MG: 2 INJECTION INTRAMUSCULAR; INTRAVENOUS at 05:21

## 2022-09-22 RX ADMIN — SODIUM CHLORIDE, PRESERVATIVE FREE 40 MG: 5 INJECTION INTRAVENOUS at 20:27

## 2022-09-22 ASSESSMENT — PAIN DESCRIPTION - LOCATION
LOCATION: ABDOMEN

## 2022-09-22 ASSESSMENT — PAIN DESCRIPTION - DESCRIPTORS
DESCRIPTORS: ACHING;STABBING
DESCRIPTORS: DISCOMFORT
DESCRIPTORS: STABBING;THROBBING
DESCRIPTORS: ACHING;SHARP
DESCRIPTORS: ACHING;THROBBING;SORE;PRESSURE

## 2022-09-22 ASSESSMENT — PAIN - FUNCTIONAL ASSESSMENT
PAIN_FUNCTIONAL_ASSESSMENT: PREVENTS OR INTERFERES SOME ACTIVE ACTIVITIES AND ADLS
PAIN_FUNCTIONAL_ASSESSMENT: PREVENTS OR INTERFERES WITH MANY ACTIVE NOT PASSIVE ACTIVITIES
PAIN_FUNCTIONAL_ASSESSMENT: PREVENTS OR INTERFERES SOME ACTIVE ACTIVITIES AND ADLS
PAIN_FUNCTIONAL_ASSESSMENT: ACTIVITIES ARE NOT PREVENTED

## 2022-09-22 ASSESSMENT — PAIN DESCRIPTION - ONSET
ONSET: GRADUAL
ONSET: ON-GOING

## 2022-09-22 ASSESSMENT — PAIN DESCRIPTION - ORIENTATION
ORIENTATION: ANTERIOR
ORIENTATION: MID
ORIENTATION: ANTERIOR
ORIENTATION: MID;ANTERIOR
ORIENTATION: ANTERIOR;MID

## 2022-09-22 ASSESSMENT — PAIN DESCRIPTION - PAIN TYPE
TYPE: CHRONIC PAIN

## 2022-09-22 ASSESSMENT — PAIN DESCRIPTION - FREQUENCY
FREQUENCY: CONTINUOUS

## 2022-09-22 ASSESSMENT — PAIN SCALES - GENERAL
PAINLEVEL_OUTOF10: 6
PAINLEVEL_OUTOF10: 6
PAINLEVEL_OUTOF10: 7
PAINLEVEL_OUTOF10: 0
PAINLEVEL_OUTOF10: 4
PAINLEVEL_OUTOF10: 6
PAINLEVEL_OUTOF10: 3
PAINLEVEL_OUTOF10: 6
PAINLEVEL_OUTOF10: 6

## 2022-09-22 NOTE — PROGRESS NOTES
PT Daily Note:  Attempted to see patient for physical therapy this morning but patient was off the floor in IR. Will check back on patient at a later date/time if schedule permits. Thank you,  Mily Yang.  Candy, CHANDANA

## 2022-09-22 NOTE — PROGRESS NOTES
Brief Nutrition Note:    Alerted by NP, Artist Yenni, that patient would like Ensure Clear due to not tolerating Ensure High Protein. Will change to Ensure clear. Continue to follow per protocol.     Penelope Dinh, RD

## 2022-09-22 NOTE — PROGRESS NOTES
[facility] Progress Note         Admit Date:  9/12/2022    Today's Date:  9/22/2022    CC: Re-consult for intractable N/V, elevated LFT's    Subjective:     Patient has intermittent epigastric pain. She denies nausea or vomiting. Tolerating her diet. She is s/p paracentesis with 1550 ml removed.       Medications:   Current Facility-Administered Medications   Medication Dose Route Frequency    metoclopramide (REGLAN) tablet 10 mg  10 mg Oral BID AC    sucralfate (CARAFATE) tablet 1 g  1 g Oral 4 times per day    aluminum & magnesium hydroxide-simethicone (MAALOX) 200-200-20 MG/5ML suspension 30 mL  30 mL Oral Q6H PRN    LORazepam (ATIVAN) injection 1 mg  1 mg IntraVENous Q6H PRN    pantoprazole (PROTONIX) 40 mg in sodium chloride (PF) 10 mL injection  40 mg IntraVENous Q12H    ondansetron (ZOFRAN) injection 4 mg  4 mg IntraVENous Q4H PRN    promethazine (PHENERGAN) injection 6.25 mg  6.25 mg IntraMUSCular Q6H PRN    0.9 % sodium chloride infusion   IntraVENous Continuous    HYDROcodone-acetaminophen (NORCO) 5-325 MG per tablet 1 tablet  1 tablet Oral Q4H PRN    diatrizoate meglumine-sodium (GASTROGRAFIN) 66-10 % solution 15 mL  15 mL Oral ONCE PRN    apixaban (ELIQUIS) tablet 5 mg  5 mg Oral BID    docusate sodium (COLACE) capsule 100 mg  100 mg Oral BID    sodium chloride flush 0.9 % injection 5-40 mL  5-40 mL IntraVENous 2 times per day    sodium chloride flush 0.9 % injection 5-40 mL  5-40 mL IntraVENous PRN    0.9 % sodium chloride infusion   IntraVENous PRN    polyethylene glycol (GLYCOLAX) packet 17 g  17 g Oral Daily PRN    acetaminophen (TYLENOL) tablet 650 mg  650 mg Oral Q6H PRN    Or    acetaminophen (TYLENOL) suppository 650 mg  650 mg Rectal Q6H PRN    potassium chloride (KLOR-CON M) extended release tablet 40 mEq  40 mEq Oral PRN    Or    potassium bicarb-citric acid (EFFER-K) effervescent tablet 40 mEq  40 mEq Oral PRN    Or    potassium chloride 10 mEq/100 mL IVPB (Peripheral Line) 10 mEq IntraVENous PRN    magnesium sulfate 2000 mg in 50 mL IVPB premix  2,000 mg IntraVENous PRN    oxyCODONE (ROXICODONE) immediate release tablet 5 mg  5 mg Oral Q4H PRN       Review of Systems:  ROS was obtained, with pertinent positives as listed above. No chest pain or SOB. Diet:  Full liquid, Nutrition supplements    Objective:   Vitals:  /74   Pulse 73   Temp 97.5 °F (36.4 °C) (Oral)   Resp 18   Ht 5' 5\" (1.651 m)   Wt 112 lb (50.8 kg)   SpO2 100%   BMI 18.64 kg/m²   Intake/Output:  No intake/output data recorded. 09/20 1901 - 09/22 0700  In: 1080 [P.O.:430; I.V.:650]  Out: 375 [Urine:375]  Exam:  General appearance: alert, cooperative, no distress, sitting up in bed eating. Right chest PAC. Lungs: clear to auscultation bilaterally anteriorly  Heart: regular rate and rhythm  Abdomen: soft, non tender, Bowel sounds normal. No masses, no organomegaly  Extremities: extremities normal, atraumatic, no cyanosis or edema  Neuro:  alert and oriented    Data Review (Labs):    Recent Labs     09/20/22  0312 09/21/22  0430 09/22/22  0412   WBC 3.9* 4.6 4.3   HGB 7.8* 7.6* 7.4*   HCT 25.4* 24.0* 23.4*    223 260   MCV 88.5 86.0 87.0    137 139   K 3.5 3.1* 3.6    108 109   CO2 26 21 21   BUN 14 9 11   MG 1.6* 1.9 1.6*   * 180* 61*   * 156* 102*      Latest Reference Range & Units 9/22/22 04:12   Albumin 3.5 - 5.0 g/dL 1.8 (L)   Globulin 2.3 - 3.5 g/dL 3.2   ALBUMIN/GLOBULIN RATIO 1.2 - 3.5   0.6 (L)   Alk Phosphatase 50 - 136 U/L 255 (H)   ALT 12 - 65 U/L 102 (H)   AST 15 - 37 U/L 61 (H)   Bilirubin 0.2 - 1.1 MG/DL 1.1   Total Protein 6.3 - 8.2 g/dL 5.0 (L)   (L): Data is abnormally low  (H): Data is abnormally high    Interventional Radiology Brief Procedure Note     Patient: Harjinder Fitzgerald MRN: [id number]  SSN: [id number]    YOB: 1975  Age: 52 y.o.   Sex: female       Date of Procedure: 9/22/2022     Pre-Procedure Diagnosis: Recurrent malignant ascites. Post-Procedure Diagnosis: SAME     Procedure(s): Paracentesis     Brief Description of Procedure: as above     Performed By: Padmini Reed MD      Assistants: None     Anesthesia:Lidocaine     Estimated Blood Loss: Less than 10ml     Specimens:  None     Implants:  None     Findings: Moderate volume ascites. Complications: None     Recommendations: We discussed PeritX drain placement. She would like to think about this, but feels that if the ascites should return, that she will likely opt for tunnelled peritoneal drain placement. Follow Up: PRN     Signed By: Padmini Reed MD      September 22, 2022                   MRI ABDOMEN WO CONTRAST 9/21/2022 3:01 PM       HISTORY: Abnormal liver function tests and abnormal ultrasound findings. COMPARISON: Abdominal ultrasound 9/20/2022. TECHNIQUE:  Multisequence, multiplanar imaging is performed through the biliary   system. 3D post processing was performed, images were archived to PACS and used   in the interpretation of this study. CONTRAST: None. FINDINGS:        ABDOMEN: Large ascites is present. Liver appears shrunken and cirrhotic. MRCP   sequence shows no evidence of intrahepatic or extrahepatic bile duct dilatation. Possible tiny filling defect within the right main hepatic duct on series 5,   images 15 and 16. No associated obstruction. No gallstone noted in the common   bile duct. Gallbladder demonstrates wall thickening and multiple intraluminal   stones as described on prior ultrasound. No focal liver lesions on this   noncontrast study. Pancreas, spleen and adrenal glands are unremarkable. Significant bilateral   hydronephrosis is present as described on prior ultrasound. No renal calculi are   appreciated when compared to prior CT from 9/12/2022. Bilateral ureteral stents   are in place. No bowel dilatation to suggest obstruction. Impression   1.  No significant intrahepatic or extrahepatic bile duct dilatation. No common   bile duct stone is appreciated. Questionable tiny filling defect within the   right main hepatic duct. Cholelithiasis. Nonspecific gallbladder wall thickening   as described on prior ultrasound. 2. Marked bilateral hydronephrosis with indwelling ureteral stents. 3. Large volume of ascites. Cirrhotic liver morphology. Abdominal US 9/20/22  1. Cirrhotic liver. 2.  Intrahepatic, and borderline extra hepatic biliary ductal dilation. This can   be further assessed with MRCP or ERCP. 3.  Moderate complex ascites which at times appears septated. Signs or symptoms   of infection (i.e. peritonitis) should be excluded. 4. Large bilateral renal stones. In addition, there is moderate right   hydronephrosis, and moderate to severe left hydronephrosis of uncertain acuity. Fluid within the dilated left renal pelvis appears complex demonstrating debris. This could be an indicator of blood or infection. Recommend correlation with   clinical exam and urinalysis findings. 5. Cholelithiasis and gallbladder wall thickening. Gallbladder wall thickening   is nonspecific in the setting of ascites. Signs/symptoms of potential   cholecystitis should be excluded. This can be further assessed with a nuclear   medicine hepatobiliary scan if clinically indicated. Barium swallow 9/18/22 revealed   1. No evidence of leak or obstruction. 2.  Possible inflammation in the proximal jejunum. Assessment:     Principal Problem:    Hematemesis  Active Problems:    Bilateral hydronephrosis    Peritoneal carcinomatosis (HCC)    Current use of long term anticoagulation    Intractable vomiting    Generalized abdominal pain    LFT elevation    Obstruction of both ureters    Carcinoma of unknown primary ([facility] [address]  Resolved Problems:    * No resolved hospital problems.  *    52 y.o. female with PMH including but not limited to metastatic carcinoma of unknown primary source dx 2/2022- on chemo (last dose 9/10/22), exlap 8/2022 for debulking of tumor (unsuccessful- tightly adherent) followed by Shanice Pritchett on Eliquis  since 6/2022 who was seen in RE-consultation at the request of Joe Coreas NP for intractable N/V with dysphagia. Barium swallow negative for leak or obstruction. Elevated LFT's noted on 9/20/22. Abdominal US revealed dilated intra and extrahepatic ducts. MRI ABDOMEN WO CONTRAST 9/21/2022 3:01 PM  Impression   1. No significant intrahepatic or extrahepatic bile duct dilatation. No common   bile duct stone is appreciated. Questionable tiny filling defect within the   right main hepatic duct. Cholelithiasis. Nonspecific gallbladder wall thickening   as described on prior ultrasound. 2. Marked bilateral hydronephrosis with indwelling ureteral stents. 3. Large volume of ascites. Cirrhotic liver morphology. 9/22/22 LFT's trending down. Bilirubin has normalized to 1.1. S/p US guided paracentesis with 1550 ml removed. Plan:     - Supportive care, maintain electrolytes, antiemetics  - Continue Protonix BID   - Continue Carafate 1 g QID   - On Full liquid diet. Nutritional supplements advised. SCOTT Harrison  [facility]      Patient is seen and examined in collaboration with Dr. Jayleen Martins. Assessment and plan as per Dr. Evgeny Gutierrez.

## 2022-09-22 NOTE — PROGRESS NOTES
ACUTE OCCUPATIONAL THERAPY GOALS:   (Developed with and agreed upon by patient and/or caregiver.)  1. Patient will complete lower body bathing and dressing with MOD I and adaptive equipment as needed. 2.Patient will complete upper body bathing and dressing with MOD I and adaptive equipment as needed. 3. Patient will complete toileting with MOD I.   4. Patient will tolerate 30 minutes of OT treatment with 1-2 rest breaks to increase activity tolerance for ADLs. 5. Patient will complete functional transfers with MOD I and adaptive equipment as needed. 6. Patient will complete functional activity with MOD I and adaptive equipment as needed. Timeframe: 7 visits         OCCUPATIONAL THERAPY: Daily Note PM   OT Visit Days: 2   Time  OT Charge Capture  Rehab Caseload Tracker  OT Orders    Blake Sharma is a 52 y.o. female   PRIMARY DIAGNOSIS: Hematemesis  Hematemesis [K92.0]  Generalized abdominal pain [R10.84]  Intractable vomiting with nausea, unspecified vomiting type [R11.2]  Procedure(s) (LRB):  EGD ESOPHAGOGASTRODUODENOSCOPY OFL 17 To IR after recovery for Para (N/A)  9 Days Post-Op  Inpatient: Payor: Kalli Berrios 150 / Plan: [employer] [place] / Product Type: *No Product type* /     ASSESSMENT:     REHAB RECOMMENDATIONS: CURRENT LEVEL OF FUNCTION:  (Most Recently Demonstrated)   Recommendation to date pending progress:  Setting:  Home Health Therapy    Equipment:    To Be Determined Bathing:  Not Tested  Dressing:  Not Tested  Feeding/Grooming:  Not Tested  Toileting:  Not Tested  Functional Mobility:  Supervision/Setup- SBA     ASSESSMENT:  Ms. Walker presents to the hospital with hematemesis. Today pt presents with decreased activity tolerance, balance, strength and mobility impacting ADLs. Pt required encouragement to complete therapy today. Pt overall SBA-supervision managing IV pole for functional transfers and mobility of household distances in hallway. Pt appeared to have a flat affect and fatigue quickly.  Pt declined all ADLs today. Pt is slowly progressing toward goals, continue POC.         SUBJECTIVE:     Ms. Carlotta Mixon states, \"We can walk around the aggarwal\"     Social/Functional      OBJECTIVE:     Carlene Sours / Green Ermelinda / Amarilys Fillers: IV    RESTRICTIONS/PRECAUTIONS:           PAIN: VITALS / O2:   Pre Treatment:   Pain Assessment: None - Denies Pain          Post Treatment: no c/o pain, resting comfortably in bedside chair Vitals          Oxygen            MOBILITY: I Mod I S SBA CGA Min Mod Max Total  NT x2 Comments:   Bed Mobility    Rolling [] [] [] [] [] [] [] [] [] [x] []    Supine to Sit [x] [] [] [] [] [] [] [] [] [] []    Scooting [x] [] [] [] [] [] [] [] [] [] []    Sit to Supine [] [] [] [] [] [] [] [] [] [x] []    Transfers    Sit to Stand [] [] [x] [] [] [] [] [] [] [] [] No AD   Bed to Chair [] [] [] [x] [] [] [] [] [] [] [] No AD   Stand to Sit [] [] [x] [] [] [] [] [] [] [] [] No AD   Tub/Shower [] [] [] [] [] [] [] [] [] [x] []     Toilet [] [] [] [] [] [] [] [] [] [x] []      [] [] [] [] [] [] [] [] [] [x] []    I=Independent, Mod I=Modified Independent, S=Supervision/Setup, SBA=Standby Assistance, CGA=Contact Guard Assistance, Min=Minimal Assistance, Mod=Moderate Assistance, Max=Maximal Assistance, Total=Total Assistance, NT=Not Tested    ACTIVITIES OF DAILY LIVING: I Mod I S SBA CGA Min Mod Max Total NT Comments   BASIC ADLs:              Upper Body   Bathing [] [] [] [] [] [] [] [] [] [x]    Lower Body Bathing [] [] [] [] [] [] [] [] [] [x]    Toileting [] [] [] [] [] [] [] [] [] [x]    Upper Body Dressing [] [] [] [] [] [] [] [] [] [x]    Lower Body Dressing [] [] [] [] [] [] [] [] [] [x]    Feeding [] [] [] [] [] [] [] [] [] [x]    Grooming [] [] [] [] [] [] [] [] [] [x]    Personal Device Care [] [] [] [] [] [] [] [] [] [x]    Functional Mobility [] [] [x] [x] [] [] [] [] [] [] No AD   I=Independent, Mod I=Modified Independent, S=Supervision/Setup, SBA=Standby Assistance, CGA=Contact Guard Assistance, Min=Minimal Assistance, Mod=Moderate Assistance, Max=Maximal Assistance, Total=Total Assistance, NT=Not Tested    BALANCE: Good Fair+ Fair Fair- Poor NT Comments   Sitting Static [x] [] [] [] [] []    Sitting Dynamic [x] [] [] [] [] []              Standing Static [x] [] [] [] [] []    Standing Dynamic [] [x] [] [] [] []        PLAN:     FREQUENCY/DURATION   OT Plan of Care: 3 times/week for duration of hospital stay or until stated goals are met, whichever comes first.    TREATMENT:     TREATMENT:   Therapeutic Activity (15 Minutes): Therapeutic activity included Supine to Sit, Scooting, Transfer Training, Ambulation on level ground, Sitting balance , and Standing balance to improve functional Activity tolerance, Balance, Coordination, Mobility, and Strength.     TREATMENT GRID:  N/A    AFTER TREATMENT PRECAUTIONS: Call light within reach, Chair, Needs within reach, RN notified, and Visitors at bedside    INTERDISCIPLINARY COLLABORATION:  RN/ PCT and OT/ DURAN    EDUCATION:   Importance of OOB mobility to increase activity tolerance for ADLs    TOTAL TREATMENT DURATION AND TIME:  Time In: [address]  Time Out: [address] [facility]  Minutes: [address], OT

## 2022-09-22 NOTE — PROGRESS NOTES
TRANSFER - IN REPORT:    Verbal report received from Tiffani(name) on [facility]  being received from IR(unit) for routine post-op      Report consisted of patients Situation, Background, Assessment and   Recommendations(SBAR). Information from the following report(s) Nurse Handoff Report and Recent Results was reviewed with the receiving nurse. Opportunity for questions and clarification was provided.       Assessment completed upon patients arrival to unit and care assume

## 2022-09-22 NOTE — OR NURSING
Interventional Radiology Post Paracentesis/Thoracentesis Note    9/22/2022    Procedure(s): Ultrasound guided Therapeutic Paracentesis Performed with 8 Marshallese catheter total volume 1550 ml. Preliminary Findings: small clear and yellow. Complications: None    Plan:  Observation, check labs if drawn.           Chest X-Ray:  no    Full dictated report to follow

## 2022-09-22 NOTE — BRIEF OP NOTE
Department of Interventional Radiology  [phone]        Interventional Radiology Brief Procedure Note    Patient: Gabe Perez MRN: [id number]  SSN: [id number]    YOB: 1975  Age: 52 y.o. Sex: female      Date of Procedure: 9/22/2022    Pre-Procedure Diagnosis: Recurrent malignant ascites. Post-Procedure Diagnosis: SAME    Procedure(s): Paracentesis    Brief Description of Procedure: as above    Performed By: Brent Acevedo MD     Assistants: None    Anesthesia:Lidocaine    Estimated Blood Loss: Less than 10ml    Specimens:  None    Implants:  None    Findings: Moderate volume ascites. Complications: None    Recommendations: We discussed PeritX drain placement. She would like to think about this, but feels that if the ascites should return, that she will likely opt for tunnelled peritoneal drain placement.        Follow Up: PRN    Signed By: Brent Acevedo MD     September 22, 2022

## 2022-09-22 NOTE — PROGRESS NOTES
[facility] Hematology & Oncology        Inpatient Hematology / Oncology Progress Note      Admission Date: 2022  5:06 PM  Reason for Admission/Hospital Course: Hematemesis [K92.0]  Generalized abdominal pain [R10.84]  Intractable vomiting with nausea, unspecified vomiting type [R11.2]      24 Hour Events:  Feeling much better today  Para today removed ~1 liter  Likely to have pleurex placed on Monday    ROS:  Constitutional: negative for fever, chills. +weakness, malaise fatigue. CV: negative for chest pain, palpitations, edema. Respiratory: negative for dyspnea, cough, wheezing. GI: +nausea/vomiting/ab pain improved    10 point review of systems is otherwise negative with the exception of the elements mentioned above in the HPI. No Known Allergies    OBJECTIVE:  Patient Vitals for the past 8 hrs:   BP Temp Temp src Pulse Resp SpO2   22 1240 -- -- -- -- 18 --   22 1120 127/78 97.8 °F (36.6 °C) Oral 78 18 98 %   22 1011 -- -- -- -- 18 --   22 0940 134/79 -- -- 82 16 100 %   22 0926 (!) 153/96 -- -- 80 16 99 %   22 0845 131/85 98.4 °F (36.9 °C) Temporal 74 16 98 %   22 0741 -- -- -- -- 18 --   22 0740 121/74 97.5 °F (36.4 °C) Oral 73 16 100 %     Temp (24hrs), Av °F (36.7 °C), Min:97.5 °F (36.4 °C), Max:98.4 °F (36.9 °C)     0701 -  1900  In: 360 [P.O.:360]  Out: -     Physical Exam:  Constitutional: Well developed, thin appearing female in no acute distress, sitting comfortably in the hospital bed. HEENT: Normocephalic and atraumatic. Oropharynx is clear, mucous membranes are moist.  Extraocular muscles are intact. Sclerae anicteric. Skin Warm and dry. No bruising and no rash noted. No erythema. Jaundice   Respiratory Lungs are clear to auscultation bilaterally without wheezes, rales or rhonchi, normal air exchange without accessory muscle use. CVS Normal rate, regular rhythm and normal S1 and S2.   No murmurs, gallops, or rubs.   Abdomen Soft, mildly tender and distended, normoactive bowel sounds. No palpable mass. No hepatosplenomegaly. Neuro Grossly nonfocal with no obvious sensory or motor deficits. MSK Normal range of motion in general.  No edema and no tenderness. Psych Appropriate mood and affect. Labs:      Recent Labs     09/20/22 0312 09/21/22  0430 09/22/22  0412   WBC 3.9* 4.6 4.3   RBC 2.87* 2.79* 2.69*   HGB 7.8* 7.6* 7.4*   HCT 25.4* 24.0* 23.4*   MCV 88.5 86.0 87.0   MCH 27.2 27.2 27.5   MCHC 30.7* 31.7 31.6   RDW 15.9* 16.0* 16.2*    223 260   MPV 10.5 10.6 11.2        Recent Labs     09/20/22 0312 09/21/22  0430 09/22/22  0412    137 139   K 3.5 3.1* 3.6    108 109   CO2 26 21 21   BUN 14 9 11   GFRAA >60 >60 >60   GLOB 2.5 3.2 3.2   MG 1.6* 1.9 1.6*         Imaging:  Xray Result (most recent):  XR ABDOMEN (KUB) (SINGLE AP VIEW) 09/17/2022    Narrative  KUB    CLINICAL INDICATION:  Vomiting, foul-smelling emesis, bilateral hydronephrosis  and ureteral stents. History of ascites, peritoneal carcinomatosis, unknown  primary. COMPARISON: CT 9/12/2022, radiography 3/2/2022    TECHNIQUE: AP view of the abdomen supine portable 9:22 AM    FINDINGS:  There are bilateral ureteral stents, similar in position compared  with recent CT. Generalized haziness again noted in keeping with ascites. Nonobstructive bowel gas pattern. Within limits of supine radiography no  evidence of developing free air, pneumatosis, pneumobilia, or osseous changes. Surgical suture material again projects over the stomach. There is a decreased  small amount of retained oral contrast in the GI tract. Lung bases demonstrate  no consolidation. Impression  1. No bowel obstruction evident. 2.  Ascites, bilateral ureteral stents as seen on recent CT.     CT Result (most recent):  CT ABDOMEN PELVIS W IV CONTRAST 09/12/2022    Narrative  EXAMINATION: CT  ABDOMEN / PELVIS   9/12/2022 8:06 PM    ACCESSION NUMBER: [id number]    COMPARISON: 06/22/2022    INDICATION:  abdominal pain    TECHNIQUE: Contiguous axial computed tomographic images were obtained from the  domes of the diaphragm to the symphysis pubis following administration 100 mL  Iso-behzad 370. Coronal reconstructions were also performed. Oral contrast was also  administered. Radiation dose reduction techniques were used for this study. Our CT scanners  use one or all of the following: Automated exposure control, adjustment of the  mA and/or kV according to patient size, iterative reconstruction. FINDINGS:    Lung bases: The visualized lung bases are clear. The visualized portions of the  heart are unremarkable. Liver: Normal    Gallbladder: There is gallbladder wall thickening. The gallbladder is not  distended. Spleen: Normal    Adrenals: Normal    Pancreas: Normal    Kidneys: The kidneys enhance symmetrically. There is moderate to severe  bilateral hydronephrosis with ureteral stents in place. Bladder/: The urinary bladder is somewhat thick-walled. There is previous  hysterectomy. .    Bowel: There is large volume ascites. There is some peritoneal enhancement. There is not peritoneal nodularity. There is some nodularity within the omentum  and mesentery. There is previous gastric sleeve type procedure. The appendix is  normal.    Vessels: Normal    Abdominal wall: Intact    Bones: No suspicious lytic or blastic bony lesions. Impression  Large amount of intraabdominal ascites with mesenteric and peritoneal nodularity  as well as peritoneal enhancement is most concerning for peritoneal  carcinomatosis. Bilateral hydronephrosis with ureteral stents in place. Thick-walled urinary bladder. Correlate with evidence of cystitis. Thick-walled gallbladder is felt likely to be sympathetic. The gallbladder is  not distended.       Medications:  Current Facility-Administered Medications   Medication Dose Route Frequency    morphine (MS CONTIN) extended release tablet 15 mg  15 mg Oral 2 times per day    metoclopramide (REGLAN) tablet 10 mg  10 mg Oral BID AC    sucralfate (CARAFATE) tablet 1 g  1 g Oral 4 times per day    aluminum & magnesium hydroxide-simethicone (MAALOX) 200-200-20 MG/5ML suspension 30 mL  30 mL Oral Q6H PRN    LORazepam (ATIVAN) injection 1 mg  1 mg IntraVENous Q6H PRN    pantoprazole (PROTONIX) 40 mg in sodium chloride (PF) 10 mL injection  40 mg IntraVENous Q12H    ondansetron (ZOFRAN) injection 4 mg  4 mg IntraVENous Q4H PRN    promethazine (PHENERGAN) injection 6.25 mg  6.25 mg IntraMUSCular Q6H PRN    0.9 % sodium chloride infusion   IntraVENous Continuous    HYDROcodone-acetaminophen (NORCO) 5-325 MG per tablet 1 tablet  1 tablet Oral Q4H PRN    diatrizoate meglumine-sodium (GASTROGRAFIN) 66-10 % solution 15 mL  15 mL Oral ONCE PRN    apixaban (ELIQUIS) tablet 5 mg  5 mg Oral BID    docusate sodium (COLACE) capsule 100 mg  100 mg Oral BID    sodium chloride flush 0.9 % injection 5-40 mL  5-40 mL IntraVENous 2 times per day    sodium chloride flush 0.9 % injection 5-40 mL  5-40 mL IntraVENous PRN    0.9 % sodium chloride infusion   IntraVENous PRN    polyethylene glycol (GLYCOLAX) packet 17 g  17 g Oral Daily PRN    acetaminophen (TYLENOL) tablet 650 mg  650 mg Oral Q6H PRN    Or    acetaminophen (TYLENOL) suppository 650 mg  650 mg Rectal Q6H PRN    potassium chloride (KLOR-CON M) extended release tablet 40 mEq  40 mEq Oral PRN    Or    potassium bicarb-citric acid (EFFER-K) effervescent tablet 40 mEq  40 mEq Oral PRN    Or    potassium chloride 10 mEq/100 mL IVPB (Peripheral Line)  10 mEq IntraVENous PRN    magnesium sulfate 2000 mg in 50 mL IVPB premix  2,000 mg IntraVENous PRN    oxyCODONE (ROXICODONE) immediate release tablet 5 mg  5 mg Oral Q4H PRN     Ms. Torey Boogie is a 52 y.o. female with medical history of PE on Eliquis and metastatic carcinoma of unknown primary followed by Dr. Flower Lyman currently sp cycle 9 FOLFOX on 9/8/2022. She had ex-lap on 8/2022 to eval for debulking but tumor was found to be tightly adherent and impossible to debulk. She presented to ED with 1 day duration of hematemesis. Pt reported she has been feeling abdominal distention that has progressively worsened over 1 week duration associated with mild abdominal discomfort. She stated that yesterday she vomited up what looked like bloody substance however she was unsure as she has drank red Gatorade earlier in the day. Per notes she had another episode of emesis in ED however at that time was nonbloody. CT AP shows large amount of intra-abdominal ascites with mesenteric/peritoneal nodularity concerning for peritoneal carcinomatosis; bilateral hydronephrosis with ureteral stents in place; thick-walled urinary bladder; thick-walled gallbladder felt to be sympathetic and not distended. IR is consulted for paracentesis with studies ordered. Eliquis is on hold and GI is consulted. PLAN:  CUP now with large volume ascites  -sp cycle 9 FOLFOX on 9/8/2022.  - CT AP shows large amount of intra-abdominal ascites with mesenteric/peritoneal nodularity concerning for peritoneal carcinomatosis; bilateral hydronephrosis with ureteral stents in place; thick-walled urinary bladder; thick-walled gallbladder felt to be sympathetic and not distended.  -Para with studies tomorrow-EGD today  9/14 EGD negative for bleed. Para -3800 studies ordered. Advance diet. 9/15 having nausea-antiemetics  9/16 complaining of acid reflux-added one time IV Pepcid-already on PO protonix bid  9/20 may need another para-follow up on AB US but may need repeat CT to evaluate  9/21 AB US: Cirrhotic liver. Intrahepatic, and borderline extra hepatic biliary ductal dilation. Moderate complex ascites which at times appears septated. Signs or symptoms of infection (i.e. peritonitis) should be excluded. Large bilateral renal stones.  In addition, there is moderate right hydronephrosis, and moderate to severe left hydronephrosis of uncertain acuity. Fluid within the dilated left renal pelvis appears complex demonstrating debris. This could be an indicator of blood or infection. Recommend correlation with clinical exam and urinalysis findings. Cholelithiasis and gallbladder wall thickening. Gallbladder wall thickening is nonspecific in the setting of ascites. Signs/symptoms of potential cholecystitis should be excluded. This can be further assessed with a nuclear medicine hepatobiliary scan if clinically indicated. Consult IR for possible para. Urology consulted as well.   9/22 no changes in plan per urology currently-notifyin Dr. Darrin Sheffield as he may want to upsize stents at next exchange. MRI AB: No significant intrahepatic or extrahepatic bile duct dilatation. No common bile duct stone is appreciated. Questionable tiny filling defect within the  right main hepatic duct. Cholelithiasis. Nonspecific gallbladder wall thickening as described on prior ultrasound. Marked bilateral hydronephrosis with indwelling ureteral stents. Large volume of ascites. Cirrhotic liver morphology. LFTs improved. Bili improved. Para removing ~1L today-likely will plan on pleuex cath on Monday        History of PE  -Eliquis on hold due to hematemesis  9/14 resume Eliquis     Hematemesis / Nausea, vomiting  -PPI BID  -GI planning EGD today  -Hgb 8.9 (11.8)  9/14 hgb stable 9.2  9/17 ongoing vomiting. Emend given yesterday. Increase Zofran to 4mg q 4 hours prn and increase Ativan to 1mg q 6 hours prn. Consider marinol if no improvement. KUB neg. No evidence of bleeding. States she feels like something is stuck in her esophagus. Reconsult GI.  9/18 GI added prn phenergan. Awaiting barium study. GI following. 9/19 Barium esophagram-No evidence of leak or obstruction. Possible inflammation in the proximal jejunum. GI added carafate. We considered adding zyprexa but interacts to reglan.    9/20 taper reglan dt ellevated LFT and bili    Elevated creatinine  9/16 added fluids NS @ 75/hr  9/18 Cr down to 0.7  RESOLVED    UTI  9/16 Noted cystitis on CT. UA on 9/13 c/w UTI - not treated. Repeat UA (c/w UTI). Ucx ordered. Start CTX  9/17 Ucx pending. Con't CTX (D2).  9/18 Ucx-NGTD. Completes CTX today. 9/22 UA neg for infection. UC NTD    Continue home meds  Andres sOPs  SCDs for DVT ppx    Goals and plan of care reviewed with the patient. All questions answered to the best of our ability. Disposition:  Anticipate discharge home once sx controlled. PT recommends HH at discharge.             SCOTT Agrawal - PORSHA   [facility]ology [facility]  [id number] [place] [address]  Office : [phone]  Fax : [phone]

## 2022-09-22 NOTE — PROGRESS NOTES
END OF SHIFT SUMMARY:  Significant labs this shift:  mg 1.6  Additional events this shift:   -Pt complaints of nausea and pain, prn zofran given x3, prn norco given x2, and prn roxicodone x2  -Mg replacement IV according to parameters on the MAR    I/Os:    09/21 1901 - 09/22 0700  In: 310 [P.O.:310]  Out: 50 [Urine:50]      Richar Stahl RN

## 2022-09-22 NOTE — PROGRESS NOTES
Palliative Care Progress Note    Patient: Roro Shi MRN: [id number]  SSN: [id number]    YOB: 1975  Age: 52 y.o. Sex: female       Assessment/Plan:     Chief Complaint/Interval History: pt s/p paracentesis this am.  Utilizing prn meds for pain control with some relief. Principal Diagnosis:    Pain, abdomen  R10.9    Additional Diagnoses:   Frailty  R54  Counseling, Encounter for Medical Advice  Z71.9  Encounter for Palliative Care  Z51.5    Palliative Performance Scale (PPS)       Medical Decision Making:   Reviewed and summarized labs and imaging from past 24 hours. Met with pt at bedside. Requiring prns about every 4 hours for relief of pain. Will start ms contin 15 mg po bid. Continue norco prn. Will follow and adjust meds further as needed. Will discuss findings with members of the interdisciplinary team.      More than 50% of this 25 minute visit was spent counseling and coordination of care as outlined above. Subjective:     Comprehensive Review of Systems completed and negative with the exception of as noted above     Objective:     Visit Vitals  /78   Pulse 78   Temp 97.8 °F (36.6 °C) (Oral)   Resp 18   Ht 5' 5\" (1.651 m)   Wt 112 lb (50.8 kg)   SpO2 98%   BMI 18.64 kg/m²       Physical Exam:    General:  Cooperative. No acute distress. Eyes:  Conjunctivae/corneas clear    Nose: Nares normal. Septum midline. Neck: Supple, symmetrical, trachea midline, no JVD   Lungs:   Clear to auscultation bilaterally, unlabored   Heart:  Regular rate and rhythm, no murmur    Abdomen:   Soft, non-tender, non-distended   Extremities: Normal, atraumatic, no cyanosis or edema   Skin: Skin color, texture, turgor normal. No rash or lesions.    Neurologic: Nonfocal   Psych: Alert and oriented     Signed By: Lazara Hurd MD     September 22, 2022

## 2022-09-22 NOTE — OR NURSING
TRANSFER - OUT REPORT:           Verbal report given to TRI Hansen on [employer]  being transferred to room 525 for routine progression of patient care              Report consisted of patients Situation, Background, Assessment and      Recommendations(SBAR). Information from the following report(s) SBAR and Procedure Summary was reviewed with the receiving nurse. Opportunity for questions and clarification was provided. Pt tolerated procedure well. Peripheral Intravenous Line:   Peripheral IV 09/12/22 Right Antecubital (Active)   Site Assessment Clean, dry & intact 09/22/22 0242   Line Status Normal saline locked; Capped;Flushed 09/22/22 0242   Line Care Cap changed 09/22/22 0242   Phlebitis Assessment No symptoms 09/22/22 0242   Infiltration Assessment 0 09/22/22 0242   Alcohol Cap Used Yes 09/22/22 0242   Dressing Status Clean, dry & intact 09/22/22 0242   Dressing Type Transparent 09/22/22 0242   Dressing Intervention Other (Comment) 09/20/22 0806       VITALS:  /79   Pulse 82   Temp 98.4 °F (36.9 °C) (Temporal)   Resp 16   Ht 5' 5\" (1.651 m)   Wt 112 lb (50.8 kg)   SpO2 100%   BMI 18.64 kg/m²

## 2022-09-23 ENCOUNTER — HOSPITAL ENCOUNTER (OUTPATIENT)
Dept: INFUSION THERAPY | Age: 47
End: 2022-09-23

## 2022-09-23 LAB
ALBUMIN SERPL-MCNC: 1.6 G/DL (ref 3.5–5)
ALBUMIN/GLOB SERPL: 0.6 {RATIO} (ref 1.2–3.5)
ALP SERPL-CCNC: 209 U/L (ref 50–136)
ALT SERPL-CCNC: 62 U/L (ref 12–65)
ANION GAP SERPL CALC-SCNC: 8 MMOL/L (ref 4–13)
AST SERPL-CCNC: 21 U/L (ref 15–37)
BACTERIA SPEC CULT: NORMAL
BASOPHILS # BLD: 0 K/UL (ref 0–0.2)
BASOPHILS NFR BLD: 0 % (ref 0–2)
BILIRUB SERPL-MCNC: 0.7 MG/DL (ref 0.2–1.1)
BUN SERPL-MCNC: 7 MG/DL (ref 6–23)
CALCIUM SERPL-MCNC: 7.6 MG/DL (ref 8.3–10.4)
CHLORIDE SERPL-SCNC: 110 MMOL/L (ref 101–110)
CO2 SERPL-SCNC: 22 MMOL/L (ref 21–32)
CREAT SERPL-MCNC: 0.4 MG/DL (ref 0.6–1)
DIFFERENTIAL METHOD BLD: ABNORMAL
EOSINOPHIL # BLD: 0 K/UL (ref 0–0.8)
EOSINOPHIL NFR BLD: 0 % (ref 0.5–7.8)
ERYTHROCYTE [DISTWIDTH] IN BLOOD BY AUTOMATED COUNT: 16.3 % (ref 11.9–14.6)
GLOBULIN SER CALC-MCNC: 2.9 G/DL (ref 2.3–3.5)
GLUCOSE SERPL-MCNC: 86 MG/DL (ref 65–100)
HCT VFR BLD AUTO: 23.2 % (ref 35.8–46.3)
HGB BLD-MCNC: 7.3 G/DL (ref 11.7–15.4)
IMM GRANULOCYTES # BLD AUTO: 0 K/UL (ref 0–0.5)
IMM GRANULOCYTES NFR BLD AUTO: 1 % (ref 0–5)
LYMPHOCYTES # BLD: 0.6 K/UL (ref 0.5–4.6)
LYMPHOCYTES NFR BLD: 16 % (ref 13–44)
MAGNESIUM SERPL-MCNC: 1.6 MG/DL (ref 1.8–2.4)
MCH RBC QN AUTO: 26.9 PG (ref 26.1–32.9)
MCHC RBC AUTO-ENTMCNC: 31.5 G/DL (ref 31.4–35)
MCV RBC AUTO: 85.6 FL (ref 79.6–97.8)
MONOCYTES # BLD: 0.9 K/UL (ref 0.1–1.3)
MONOCYTES NFR BLD: 24 % (ref 4–12)
NEUTS SEG # BLD: 2.3 K/UL (ref 1.7–8.2)
NEUTS SEG NFR BLD: 59 % (ref 43–78)
NRBC # BLD: 0 K/UL (ref 0–0.2)
PLATELET # BLD AUTO: 304 K/UL (ref 150–450)
PMV BLD AUTO: 11.2 FL (ref 9.4–12.3)
POTASSIUM SERPL-SCNC: 3.2 MMOL/L (ref 3.5–5.1)
PROT SERPL-MCNC: 4.5 G/DL (ref 6.3–8.2)
RBC # BLD AUTO: 2.71 M/UL (ref 4.05–5.2)
SERVICE CMNT-IMP: NORMAL
SODIUM SERPL-SCNC: 140 MMOL/L (ref 136–145)
WBC # BLD AUTO: 3.8 K/UL (ref 4.3–11.1)

## 2022-09-23 PROCEDURE — 1100000000 HC RM PRIVATE

## 2022-09-23 PROCEDURE — 2580000003 HC RX 258: Performed by: FAMILY MEDICINE

## 2022-09-23 PROCEDURE — 99233 SBSQ HOSP IP/OBS HIGH 50: CPT | Performed by: INTERNAL MEDICINE

## 2022-09-23 PROCEDURE — 2580000003 HC RX 258: Performed by: NURSE PRACTITIONER

## 2022-09-23 PROCEDURE — 6370000000 HC RX 637 (ALT 250 FOR IP): Performed by: FAMILY MEDICINE

## 2022-09-23 PROCEDURE — 6370000000 HC RX 637 (ALT 250 FOR IP): Performed by: NURSE PRACTITIONER

## 2022-09-23 PROCEDURE — A4216 STERILE WATER/SALINE, 10 ML: HCPCS | Performed by: NURSE PRACTITIONER

## 2022-09-23 PROCEDURE — 99232 SBSQ HOSP IP/OBS MODERATE 35: CPT | Performed by: INTERNAL MEDICINE

## 2022-09-23 PROCEDURE — 6360000002 HC RX W HCPCS: Performed by: FAMILY MEDICINE

## 2022-09-23 PROCEDURE — C9113 INJ PANTOPRAZOLE SODIUM, VIA: HCPCS | Performed by: NURSE PRACTITIONER

## 2022-09-23 PROCEDURE — 6370000000 HC RX 637 (ALT 250 FOR IP): Performed by: INTERNAL MEDICINE

## 2022-09-23 PROCEDURE — 83735 ASSAY OF MAGNESIUM: CPT

## 2022-09-23 PROCEDURE — 85025 COMPLETE CBC W/AUTO DIFF WBC: CPT

## 2022-09-23 PROCEDURE — 80053 COMPREHEN METABOLIC PANEL: CPT

## 2022-09-23 PROCEDURE — 6360000002 HC RX W HCPCS: Performed by: NURSE PRACTITIONER

## 2022-09-23 RX ADMIN — SUCRALFATE 1 G: 1 TABLET ORAL at 11:50

## 2022-09-23 RX ADMIN — APIXABAN 5 MG: 5 TABLET, FILM COATED ORAL at 09:49

## 2022-09-23 RX ADMIN — ONDANSETRON 4 MG: 2 INJECTION INTRAMUSCULAR; INTRAVENOUS at 09:55

## 2022-09-23 RX ADMIN — MORPHINE SULFATE 15 MG: 15 TABLET, FILM COATED, EXTENDED RELEASE ORAL at 21:05

## 2022-09-23 RX ADMIN — SUCRALFATE 1 G: 1 TABLET ORAL at 00:01

## 2022-09-23 RX ADMIN — OXYCODONE 5 MG: 5 TABLET ORAL at 05:06

## 2022-09-23 RX ADMIN — SUCRALFATE 1 G: 1 TABLET ORAL at 16:39

## 2022-09-23 RX ADMIN — SUCRALFATE 1 G: 1 TABLET ORAL at 05:06

## 2022-09-23 RX ADMIN — OXYCODONE 5 MG: 5 TABLET ORAL at 00:01

## 2022-09-23 RX ADMIN — METOCLOPRAMIDE 10 MG: 10 TABLET ORAL at 16:38

## 2022-09-23 RX ADMIN — SODIUM CHLORIDE: 9 INJECTION, SOLUTION INTRAVENOUS at 00:15

## 2022-09-23 RX ADMIN — SODIUM CHLORIDE, PRESERVATIVE FREE 40 MG: 5 INJECTION INTRAVENOUS at 21:04

## 2022-09-23 RX ADMIN — MAGNESIUM SULFATE HEPTAHYDRATE 2000 MG: 40 INJECTION, SOLUTION INTRAVENOUS at 06:23

## 2022-09-23 RX ADMIN — Medication 1 MG: at 16:49

## 2022-09-23 RX ADMIN — POTASSIUM CHLORIDE 40 MEQ: 1500 TABLET, EXTENDED RELEASE ORAL at 06:25

## 2022-09-23 RX ADMIN — SODIUM CHLORIDE, PRESERVATIVE FREE 40 MG: 5 INJECTION INTRAVENOUS at 09:55

## 2022-09-23 RX ADMIN — HYDROCODONE BITARTRATE AND ACETAMINOPHEN 1 TABLET: 5; 325 TABLET ORAL at 14:04

## 2022-09-23 RX ADMIN — MORPHINE SULFATE 15 MG: 15 TABLET, FILM COATED, EXTENDED RELEASE ORAL at 09:49

## 2022-09-23 RX ADMIN — DOCUSATE SODIUM 100 MG: 100 CAPSULE, LIQUID FILLED ORAL at 09:50

## 2022-09-23 RX ADMIN — SODIUM CHLORIDE: 9 INJECTION, SOLUTION INTRAVENOUS at 11:02

## 2022-09-23 RX ADMIN — ONDANSETRON 4 MG: 2 INJECTION INTRAMUSCULAR; INTRAVENOUS at 14:02

## 2022-09-23 RX ADMIN — SODIUM CHLORIDE, PRESERVATIVE FREE 10 ML: 5 INJECTION INTRAVENOUS at 21:06

## 2022-09-23 RX ADMIN — APIXABAN 5 MG: 5 TABLET, FILM COATED ORAL at 21:04

## 2022-09-23 ASSESSMENT — PAIN SCALES - GENERAL
PAINLEVEL_OUTOF10: 6
PAINLEVEL_OUTOF10: 6
PAINLEVEL_OUTOF10: 3

## 2022-09-23 ASSESSMENT — PAIN - FUNCTIONAL ASSESSMENT
PAIN_FUNCTIONAL_ASSESSMENT: ACTIVITIES ARE NOT PREVENTED
PAIN_FUNCTIONAL_ASSESSMENT: ACTIVITIES ARE NOT PREVENTED

## 2022-09-23 ASSESSMENT — PAIN DESCRIPTION - ONSET
ONSET: GRADUAL
ONSET: GRADUAL

## 2022-09-23 ASSESSMENT — PAIN DESCRIPTION - LOCATION
LOCATION: ABDOMEN
LOCATION: ABDOMEN

## 2022-09-23 ASSESSMENT — PAIN DESCRIPTION - DESCRIPTORS
DESCRIPTORS: DISCOMFORT;ACHING
DESCRIPTORS: DISCOMFORT

## 2022-09-23 ASSESSMENT — PAIN DESCRIPTION - ORIENTATION: ORIENTATION: MID

## 2022-09-23 ASSESSMENT — PAIN DESCRIPTION - FREQUENCY
FREQUENCY: CONTINUOUS
FREQUENCY: CONTINUOUS

## 2022-09-23 ASSESSMENT — PAIN DESCRIPTION - PAIN TYPE
TYPE: CHRONIC PAIN
TYPE: CHRONIC PAIN

## 2022-09-23 NOTE — PROGRESS NOTES
Patient has remained A&O x 4. Pt has remained nauseated, but denies vomiting. Urine still brown, but increased output. PRN:  Zofran x2  Ativan x1  Norco x1    This evening, pt noted to have 2+ edema to bilateral ankles. She has been drinking fluids well. NS infusing at 125cc/hr today. Called Katerina London, NP and orders received to decrease rate to 75 cc/hr. Patient rounded on hourly by myself or patient assistant and encouraged to call with any needs. No signs of distress noted. Bed low, locked, call bell within reach. EI/Os:  +/- this shift:   09/23 0701 - 09/23 1900  In: 6080 [P.O.:240;  I.V.:1437]  Out: 300 [Urine:300]  Occurrences this Shift: Emesis 0    Ana Lomas, RN

## 2022-09-23 NOTE — PROGRESS NOTES
[facility] Hematology & Oncology        Inpatient Hematology / Oncology Progress Note      Admission Date: 2022  5:06 PM  Reason for Admission/Hospital Course: Hematemesis [K92.0]  Generalized abdominal pain [R10.84]  Intractable vomiting with nausea, unspecified vomiting type [R11.2]      24 Hour Events:  Feeling much better today  Bili and LFTs improving  Likely to have pleurex placed on Monday    ROS:  Constitutional: negative for fever, chills. +weakness, malaise fatigue. CV: negative for chest pain, palpitations, edema. Respiratory: negative for dyspnea, cough, wheezing. GI: +nausea/vomiting/ab pain improved    10 point review of systems is otherwise negative with the exception of the elements mentioned above in the HPI. No Known Allergies    OBJECTIVE:  Patient Vitals for the past 8 hrs:   BP Temp Temp src Pulse Resp SpO2   22 1054 124/82 97.3 °F (36.3 °C) Oral 77 18 97 %   22 0721 118/78 97.9 °F (36.6 °C) Oral 71 18 98 %   22 0536 -- -- -- -- 16 --   22 0506 -- -- -- -- 17 --     Temp (24hrs), Av.9 °F (36.6 °C), Min:97.3 °F (36.3 °C), Max:98.4 °F (36.9 °C)    No intake/output data recorded. Physical Exam:  Constitutional: Well developed, thin appearing female in no acute distress, sitting comfortably in the hospital bed. HEENT: Normocephalic and atraumatic. Oropharynx is clear, mucous membranes are moist.  Extraocular muscles are intact. Sclerae anicteric. Skin Warm and dry. No bruising and no rash noted. No erythema. Jaundice   Respiratory Lungs are clear to auscultation bilaterally without wheezes, rales or rhonchi, normal air exchange without accessory muscle use. CVS Normal rate, regular rhythm and normal S1 and S2. No murmurs, gallops, or rubs. Abdomen Soft, mildly tender and distended, normoactive bowel sounds. No palpable mass. No hepatosplenomegaly. Neuro Grossly nonfocal with no obvious sensory or motor deficits.    MSK Normal range of motion in general.  No edema and no tenderness. Psych Appropriate mood and affect. Labs:      Recent Labs     09/21/22  0430 09/22/22 0412 09/23/22  0404   WBC 4.6 4.3 3.8*   RBC 2.79* 2.69* 2.71*   HGB 7.6* 7.4* 7.3*   HCT 24.0* 23.4* 23.2*   MCV 86.0 87.0 85.6   MCH 27.2 27.5 26.9   MCHC 31.7 31.6 31.5   RDW 16.0* 16.2* 16.3*    260 304   MPV 10.6 11.2 11.2        Recent Labs     09/21/22  0430 09/22/22 0412 09/23/22  0404    139 140   K 3.1* 3.6 3.2*    109 110   CO2 21 21 22   BUN 9 11 7   GFRAA >60 >60 >60   GLOB 3.2 3.2 2.9   MG 1.9 1.6* 1.6*         Imaging:  Xray Result (most recent):  XR ABDOMEN (KUB) (SINGLE AP VIEW) 09/17/2022    Narrative  KUB    CLINICAL INDICATION:  Vomiting, foul-smelling emesis, bilateral hydronephrosis  and ureteral stents. History of ascites, peritoneal carcinomatosis, unknown  primary. COMPARISON: CT 9/12/2022, radiography 3/2/2022    TECHNIQUE: AP view of the abdomen supine portable 9:22 AM    FINDINGS:  There are bilateral ureteral stents, similar in position compared  with recent CT. Generalized haziness again noted in keeping with ascites. Nonobstructive bowel gas pattern. Within limits of supine radiography no  evidence of developing free air, pneumatosis, pneumobilia, or osseous changes. Surgical suture material again projects over the stomach. There is a decreased  small amount of retained oral contrast in the GI tract. Lung bases demonstrate  no consolidation. Impression  1. No bowel obstruction evident. 2.  Ascites, bilateral ureteral stents as seen on recent CT.     CT Result (most recent):  CT ABDOMEN PELVIS W IV CONTRAST 09/12/2022    Narrative  EXAMINATION: CT  ABDOMEN / PELVIS   9/12/2022 8:06 PM    ACCESSION NUMBER:  [id number]    COMPARISON: 06/22/2022    INDICATION:  abdominal pain    TECHNIQUE: Contiguous axial computed tomographic images were obtained from the  domes of the diaphragm to the symphysis pubis following administration 100 mL  Iso-behzad 370. Coronal reconstructions were also performed. Oral contrast was also  administered. Radiation dose reduction techniques were used for this study. Our CT scanners  use one or all of the following: Automated exposure control, adjustment of the  mA and/or kV according to patient size, iterative reconstruction. FINDINGS:    Lung bases: The visualized lung bases are clear. The visualized portions of the  heart are unremarkable. Liver: Normal    Gallbladder: There is gallbladder wall thickening. The gallbladder is not  distended. Spleen: Normal    Adrenals: Normal    Pancreas: Normal    Kidneys: The kidneys enhance symmetrically. There is moderate to severe  bilateral hydronephrosis with ureteral stents in place. Bladder/: The urinary bladder is somewhat thick-walled. There is previous  hysterectomy. .    Bowel: There is large volume ascites. There is some peritoneal enhancement. There is not peritoneal nodularity. There is some nodularity within the omentum  and mesentery. There is previous gastric sleeve type procedure. The appendix is  normal.    Vessels: Normal    Abdominal wall: Intact    Bones: No suspicious lytic or blastic bony lesions. Impression  Large amount of intraabdominal ascites with mesenteric and peritoneal nodularity  as well as peritoneal enhancement is most concerning for peritoneal  carcinomatosis. Bilateral hydronephrosis with ureteral stents in place. Thick-walled urinary bladder. Correlate with evidence of cystitis. Thick-walled gallbladder is felt likely to be sympathetic. The gallbladder is  not distended.       Medications:  Current Facility-Administered Medications   Medication Dose Route Frequency    morphine (MS CONTIN) extended release tablet 15 mg  15 mg Oral 2 times per day    metoclopramide (REGLAN) tablet 10 mg  10 mg Oral BID AC    sucralfate (CARAFATE) tablet 1 g  1 g Oral 4 times per day    aluminum & magnesium hydroxide-simethicone (MAALOX) 066-366-11 MG/5ML suspension 30 mL  30 mL Oral Q6H PRN    LORazepam (ATIVAN) injection 1 mg  1 mg IntraVENous Q6H PRN    pantoprazole (PROTONIX) 40 mg in sodium chloride (PF) 10 mL injection  40 mg IntraVENous Q12H    ondansetron (ZOFRAN) injection 4 mg  4 mg IntraVENous Q4H PRN    promethazine (PHENERGAN) injection 6.25 mg  6.25 mg IntraMUSCular Q6H PRN    0.9 % sodium chloride infusion   IntraVENous Continuous    HYDROcodone-acetaminophen (NORCO) 5-325 MG per tablet 1 tablet  1 tablet Oral Q4H PRN    diatrizoate meglumine-sodium (GASTROGRAFIN) 66-10 % solution 15 mL  15 mL Oral ONCE PRN    apixaban (ELIQUIS) tablet 5 mg  5 mg Oral BID    docusate sodium (COLACE) capsule 100 mg  100 mg Oral BID    sodium chloride flush 0.9 % injection 5-40 mL  5-40 mL IntraVENous 2 times per day    sodium chloride flush 0.9 % injection 5-40 mL  5-40 mL IntraVENous PRN    0.9 % sodium chloride infusion   IntraVENous PRN    polyethylene glycol (GLYCOLAX) packet 17 g  17 g Oral Daily PRN    acetaminophen (TYLENOL) tablet 650 mg  650 mg Oral Q6H PRN    Or    acetaminophen (TYLENOL) suppository 650 mg  650 mg Rectal Q6H PRN    potassium chloride (KLOR-CON M) extended release tablet 40 mEq  40 mEq Oral PRN    Or    potassium bicarb-citric acid (EFFER-K) effervescent tablet 40 mEq  40 mEq Oral PRN    Or    potassium chloride 10 mEq/100 mL IVPB (Peripheral Line)  10 mEq IntraVENous PRN    magnesium sulfate 2000 mg in 50 mL IVPB premix  2,000 mg IntraVENous PRN    oxyCODONE (ROXICODONE) immediate release tablet 5 mg  5 mg Oral Q4H PRN     Ms. Nelsy Mccrary is a 52 y.o. female with medical history of PE on Eliquis and metastatic carcinoma of unknown primary followed by Dr. Becki Schwartz currently sp cycle 9 FOLFOX on 9/8/2022. She had ex-lap on 8/2022 to eval for debulking but tumor was found to be tightly adherent and impossible to debulk. She presented to ED with 1 day duration of hematemesis.  Pt reported she has been clinical exam and urinalysis findings. Cholelithiasis and gallbladder wall thickening. Gallbladder wall thickening is nonspecific in the setting of ascites. Signs/symptoms of potential cholecystitis should be excluded. This can be further assessed with a nuclear medicine hepatobiliary scan if clinically indicated. Consult IR for possible para. Urology consulted as well.   9/22 no changes in plan per urology currently-notifyin Dr. Porter Branch as he may want to upsize stents at next exchange. MRI AB: No significant intrahepatic or extrahepatic bile duct dilatation. No common bile duct stone is appreciated. Questionable tiny filling defect within the  right main hepatic duct. Cholelithiasis. Nonspecific gallbladder wall thickening as described on prior ultrasound. Marked bilateral hydronephrosis with indwelling ureteral stents. Large volume of ascites. Cirrhotic liver morphology. LFTs improved. Bili improved. Para removing ~1L today-likely will plan on pleuex cath on Monday        History of PE  -Eliquis on hold due to hematemesis  9/14 resume Eliquis     Hematemesis / Nausea, vomiting  -PPI BID  -GI planning EGD today  -Hgb 8.9 (11.8)  9/14 hgb stable 9.2  9/17 ongoing vomiting. Emend given yesterday. Increase Zofran to 4mg q 4 hours prn and increase Ativan to 1mg q 6 hours prn. Consider marinol if no improvement. KUB neg. No evidence of bleeding. States she feels like something is stuck in her esophagus. Reconsult GI.  9/18 GI added prn phenergan. Awaiting barium study. GI following. 9/19 Barium esophagram-No evidence of leak or obstruction. Possible inflammation in the proximal jejunum. GI added carafate. We considered adding zyprexa but interacts to reglan. 9/20 taper reglan dt ellevated LFT and bili    Elevated creatinine  9/16 added fluids NS @ 75/hr  9/18 Cr down to 0.7  RESOLVED    UTI  9/16 Noted cystitis on CT. UA on 9/13 c/w UTI - not treated. Repeat UA (c/w UTI). Ucx ordered.   Start CTX  9/17 Ucx pending. Con't CTX (D2).  9/18 Ucx-NGTD. Completes CTX today. 9/22 UA neg for infection. UC NTD    Continue home meds  Andres sOPs  SCDs for DVT ppx    Goals and plan of care reviewed with the patient. All questions answered to the best of our ability. Disposition:  Anticipate discharge home once sx controlled. PT recommends HH at discharge.             Ranell Goldberg, APRN - PORSHA   [facility]  [address] [place] [address]  Office : [phone]  Fax : [phone]

## 2022-09-23 NOTE — PROGRESS NOTES
Physical Therapy Note:    Attempted to see patient this AM for physical therapy treatment  session. Patient refused at this time stating she is drowsy and would like to rest. Will follow and re-attempt as schedule permits/patient available.  Thank you,    Monica Barnes 89

## 2022-09-23 NOTE — PROGRESS NOTES
Reviewed notes for new spiritual concerns. Good visit with patient    Calm    No distress noted    Assured her of our prayers            Will follow as needed.

## 2022-09-23 NOTE — PROGRESS NOTES
Comprehensive Nutrition Assessment    Type and Reason for Visit: Reassess  Poor Intake/Appetite 5 or More Days (Oncology)    Nutrition Recommendations/Plan:   Meals and Snacks:  Diet: Continue current diet and advance as medically appropriate. Currently being held NPO  Nutrition Supplement Therapy:  Medical food supplement therapy:  Continue Ensure Clear three times per day (this provides 240 kcal and 8 grams protein per bottle)     Malnutrition Assessment:  Malnutrition Status: At risk for malnutrition (Comment) (+ wt loss but stable over last ~3 months, acute N/V with new ascites and possible carcinomatosis)       Nutrition Assessment:  Nutrition History: Patient states tolerating usual diet until ~1 week prior to admission. She states that she eats 3 smaller meals per day. She states she has been tolerating regular foods. She states that she has not tried Ensure or Boost. She endorses weight loss but states she has been stable over the last ~3 months (consistent with outpt office weights). She is thin appearing but good muscle retention when palpated. Do You Have Any Cultural, Oriental orthodox, or Ethnic Food Preferences?: Yes (Comment)   Nutrition Background:       Patient with metastatic cancer of unknown primary (dx 2/2022) on chemo with last cycle 9/8/22, recent ex lap to evaluate for possible debulking but unable. She presented with progressive nausea and vomiting (possibly blood) and progressive abdominal distention. She was found to have ascites and mesenteric peritoneal nodularity. She is s/p paracentesis and EGD 9/13. Paracentesis with 3.8 L removed, EGD with tiny Sussy Zamorano tear but no active bleeding. S/p barium esophagram 9/19 with possible jejunitis. Nutrition Interval:  Diet progression: 9/12 NPO; 9/13 CLD; 9/14 Regular diet; 9/15 \"no pork\" added; 9/17 NPO; 9/19 FLD + Ensure Enlive TID. Ensure Clear ordered yesterday per NP request. S/p paracentesis 9/22. Potential plurex Monday.   Patient seen reclined in bed. Observed breakfast tray with bites consumed. She states that she is tolerating Ensure Clear better that either of the other ones she has tried. She endorses early satiety and states somewhat better following paracentesis. Discussed the importance of nutrition. Explained trying to maximize PO post paracentesis when larger volumes likely tolerated. Explained using Ensure between meals to allow for better intake of meals. She voiced understanding and denied any needs. Current Nutrition Therapies:  ADULT DIET; Full Liquid; No pork  ADULT ORAL NUTRITION SUPPLEMENT; Breakfast, Lunch, Dinner; Clear Liquid Oral Supplement    Current Intake:   Average Meal Intake: 1-25% Average Supplements Intake: 51-75%      Anthropometric Measures:  Height: 5' 5\" (165.1 cm)  Current Body Wt: 120 lb 2.4 oz (54.5 kg) (9/22, after paracentesis thus likely accurate), Weight source: Standing Scale  BMI: 20, Normal Weight (BMI 18.5-24. 9)  Admission Body Weight: 111 lb 15.9 oz (50.8 kg) (9/12 stated)  Ideal Body Weight (Kg) (Calculated): 57 kg (125 lbs), 89.6 %  Usual Body Wt: 136 lb (61.7 kg) (10/19/21 office weight), Percent weight change: -17.7       Edema:    BMI Category Normal Weight (BMI 18.5-24. 9)  Estimated Daily Nutrient Needs:  Energy (kcal/day): 1877-9488 (Kcal/kg (30-35) Weight used: 50.8 kg Current  Protein (g/day): 61-66 (1.2-1.3 g/kg) Weight Used: (Current) 50.8 kg  Fluid (ml/day):   (1 ml/kcal)    Nutrition Diagnosis:   Inadequate oral intake related to altered GI function, early satiety as evidenced by nausea, vomiting (PO intake as above)  Nutrition Interventions:   Food and/or Nutrient Delivery: Continue Current Diet, Continue Oral Nutrition Supplement     Coordination of Nutrition Care: Continue to monitor while inpatient       Goals:   Previous Goal Met: Progressing toward Goal(s)  Active Goal: PO intake 50% or greater       Nutrition Monitoring and Evaluation:      Food/Nutrient Intake Outcomes: Food and Nutrient Intake, Supplement Intake  Physical Signs/Symptoms Outcomes: GI Status, Nausea or Vomiting, Fluid Status or Edema, Meal Time Behavior, Weight    Discharge Planning:     Too soon to determine    GIGI GOODRICH, RD

## 2022-09-24 LAB
ALBUMIN SERPL-MCNC: 1.6 G/DL (ref 3.5–5)
ALBUMIN/GLOB SERPL: 0.5 {RATIO} (ref 1.2–3.5)
ALP SERPL-CCNC: 323 U/L (ref 50–136)
ALT SERPL-CCNC: 54 U/L (ref 12–65)
ANION GAP SERPL CALC-SCNC: 2 MMOL/L (ref 4–13)
AST SERPL-CCNC: 31 U/L (ref 15–37)
BASOPHILS # BLD: 0 K/UL (ref 0–0.2)
BASOPHILS NFR BLD: 0 % (ref 0–2)
BILIRUB SERPL-MCNC: 0.6 MG/DL (ref 0.2–1.1)
BUN SERPL-MCNC: 4 MG/DL (ref 6–23)
CALCIUM SERPL-MCNC: 7.4 MG/DL (ref 8.3–10.4)
CHLORIDE SERPL-SCNC: 111 MMOL/L (ref 101–110)
CO2 SERPL-SCNC: 24 MMOL/L (ref 21–32)
CREAT SERPL-MCNC: 0.4 MG/DL (ref 0.6–1)
DIFFERENTIAL METHOD BLD: ABNORMAL
EOSINOPHIL # BLD: 0 K/UL (ref 0–0.8)
EOSINOPHIL NFR BLD: 1 % (ref 0.5–7.8)
ERYTHROCYTE [DISTWIDTH] IN BLOOD BY AUTOMATED COUNT: 16.7 % (ref 11.9–14.6)
GLOBULIN SER CALC-MCNC: 3.3 G/DL (ref 2.3–3.5)
GLUCOSE SERPL-MCNC: 116 MG/DL (ref 65–100)
HCT VFR BLD AUTO: 24.3 % (ref 35.8–46.3)
HGB BLD-MCNC: 7.7 G/DL (ref 11.7–15.4)
IMM GRANULOCYTES # BLD AUTO: 0 K/UL (ref 0–0.5)
IMM GRANULOCYTES NFR BLD AUTO: 1 % (ref 0–5)
LYMPHOCYTES # BLD: 0.7 K/UL (ref 0.5–4.6)
LYMPHOCYTES NFR BLD: 24 % (ref 13–44)
MAGNESIUM SERPL-MCNC: 1.8 MG/DL (ref 1.8–2.4)
MCH RBC QN AUTO: 26.7 PG (ref 26.1–32.9)
MCHC RBC AUTO-ENTMCNC: 31.7 G/DL (ref 31.4–35)
MCV RBC AUTO: 84.4 FL (ref 79.6–97.8)
MONOCYTES # BLD: 0.9 K/UL (ref 0.1–1.3)
MONOCYTES NFR BLD: 29 % (ref 4–12)
NEUTS SEG # BLD: 1.4 K/UL (ref 1.7–8.2)
NEUTS SEG NFR BLD: 45 % (ref 43–78)
NRBC # BLD: 0 K/UL (ref 0–0.2)
PLATELET # BLD AUTO: 284 K/UL (ref 150–450)
PMV BLD AUTO: 11.1 FL (ref 9.4–12.3)
POTASSIUM SERPL-SCNC: 3.3 MMOL/L (ref 3.5–5.1)
PROT SERPL-MCNC: 4.9 G/DL (ref 6.3–8.2)
RBC # BLD AUTO: 2.88 M/UL (ref 4.05–5.2)
SODIUM SERPL-SCNC: 137 MMOL/L (ref 136–145)
WBC # BLD AUTO: 3 K/UL (ref 4.3–11.1)

## 2022-09-24 PROCEDURE — 6370000000 HC RX 637 (ALT 250 FOR IP): Performed by: FAMILY MEDICINE

## 2022-09-24 PROCEDURE — 2580000003 HC RX 258: Performed by: NURSE PRACTITIONER

## 2022-09-24 PROCEDURE — 99232 SBSQ HOSP IP/OBS MODERATE 35: CPT | Performed by: INTERNAL MEDICINE

## 2022-09-24 PROCEDURE — 1100000000 HC RM PRIVATE

## 2022-09-24 PROCEDURE — 6370000000 HC RX 637 (ALT 250 FOR IP): Performed by: INTERNAL MEDICINE

## 2022-09-24 PROCEDURE — 80053 COMPREHEN METABOLIC PANEL: CPT

## 2022-09-24 PROCEDURE — 6360000002 HC RX W HCPCS: Performed by: NURSE PRACTITIONER

## 2022-09-24 PROCEDURE — 6370000000 HC RX 637 (ALT 250 FOR IP): Performed by: NURSE PRACTITIONER

## 2022-09-24 PROCEDURE — A4216 STERILE WATER/SALINE, 10 ML: HCPCS | Performed by: NURSE PRACTITIONER

## 2022-09-24 PROCEDURE — 85025 COMPLETE CBC W/AUTO DIFF WBC: CPT

## 2022-09-24 PROCEDURE — C9113 INJ PANTOPRAZOLE SODIUM, VIA: HCPCS | Performed by: NURSE PRACTITIONER

## 2022-09-24 PROCEDURE — 83735 ASSAY OF MAGNESIUM: CPT

## 2022-09-24 PROCEDURE — 2580000003 HC RX 258: Performed by: FAMILY MEDICINE

## 2022-09-24 RX ORDER — SENNA AND DOCUSATE SODIUM 50; 8.6 MG/1; MG/1
2 TABLET, FILM COATED ORAL DAILY PRN
Status: DISCONTINUED | OUTPATIENT
Start: 2022-09-24 | End: 2022-09-27 | Stop reason: HOSPADM

## 2022-09-24 RX ORDER — LACTULOSE 10 G/15ML
20 SOLUTION ORAL PRN
Status: DISCONTINUED | OUTPATIENT
Start: 2022-09-24 | End: 2022-09-27 | Stop reason: HOSPADM

## 2022-09-24 RX ORDER — POTASSIUM CHLORIDE 29.8 MG/ML
20 INJECTION INTRAVENOUS ONCE
Status: COMPLETED | OUTPATIENT
Start: 2022-09-24 | End: 2022-09-24

## 2022-09-24 RX ADMIN — MORPHINE SULFATE 15 MG: 15 TABLET, FILM COATED, EXTENDED RELEASE ORAL at 08:06

## 2022-09-24 RX ADMIN — OXYCODONE 5 MG: 5 TABLET ORAL at 12:14

## 2022-09-24 RX ADMIN — HYDROCODONE BITARTRATE AND ACETAMINOPHEN 1 TABLET: 5; 325 TABLET ORAL at 04:09

## 2022-09-24 RX ADMIN — POTASSIUM CHLORIDE 20 MEQ: 29.8 INJECTION, SOLUTION INTRAVENOUS at 09:38

## 2022-09-24 RX ADMIN — SODIUM CHLORIDE, PRESERVATIVE FREE 10 ML: 5 INJECTION INTRAVENOUS at 21:36

## 2022-09-24 RX ADMIN — APIXABAN 5 MG: 5 TABLET, FILM COATED ORAL at 08:07

## 2022-09-24 RX ADMIN — METOCLOPRAMIDE 10 MG: 10 TABLET ORAL at 08:07

## 2022-09-24 RX ADMIN — SODIUM CHLORIDE: 9 INJECTION, SOLUTION INTRAVENOUS at 09:38

## 2022-09-24 RX ADMIN — ONDANSETRON 4 MG: 2 INJECTION INTRAMUSCULAR; INTRAVENOUS at 12:06

## 2022-09-24 RX ADMIN — SODIUM CHLORIDE, PRESERVATIVE FREE 40 MG: 5 INJECTION INTRAVENOUS at 21:35

## 2022-09-24 RX ADMIN — SUCRALFATE 1 G: 1 TABLET ORAL at 12:06

## 2022-09-24 RX ADMIN — SODIUM CHLORIDE, PRESERVATIVE FREE 40 MG: 5 INJECTION INTRAVENOUS at 08:05

## 2022-09-24 RX ADMIN — SENNOSIDES AND DOCUSATE SODIUM 2 TABLET: 8.6; 5 TABLET ORAL at 16:36

## 2022-09-24 RX ADMIN — ONDANSETRON 4 MG: 2 INJECTION INTRAMUSCULAR; INTRAVENOUS at 08:05

## 2022-09-24 RX ADMIN — SUCRALFATE 1 G: 1 TABLET ORAL at 06:15

## 2022-09-24 RX ADMIN — SUCRALFATE 1 G: 1 TABLET ORAL at 00:22

## 2022-09-24 RX ADMIN — METOCLOPRAMIDE 10 MG: 10 TABLET ORAL at 16:36

## 2022-09-24 RX ADMIN — SUCRALFATE 1 G: 1 TABLET ORAL at 17:38

## 2022-09-24 RX ADMIN — MORPHINE SULFATE 15 MG: 15 TABLET, FILM COATED, EXTENDED RELEASE ORAL at 21:35

## 2022-09-24 RX ADMIN — HYDROCODONE BITARTRATE AND ACETAMINOPHEN 1 TABLET: 5; 325 TABLET ORAL at 18:17

## 2022-09-24 RX ADMIN — SUCRALFATE 1 G: 1 TABLET ORAL at 16:37

## 2022-09-24 RX ADMIN — ONDANSETRON 4 MG: 2 INJECTION INTRAMUSCULAR; INTRAVENOUS at 16:37

## 2022-09-24 RX ADMIN — SODIUM CHLORIDE, PRESERVATIVE FREE 10 ML: 5 INJECTION INTRAVENOUS at 08:07

## 2022-09-24 RX ADMIN — APIXABAN 5 MG: 5 TABLET, FILM COATED ORAL at 21:35

## 2022-09-24 ASSESSMENT — PAIN DESCRIPTION - PAIN TYPE
TYPE: CHRONIC PAIN
TYPE: OTHER (COMMENT)

## 2022-09-24 ASSESSMENT — PAIN SCALES - GENERAL
PAINLEVEL_OUTOF10: 0
PAINLEVEL_OUTOF10: 6
PAINLEVEL_OUTOF10: 0
PAINLEVEL_OUTOF10: 7
PAINLEVEL_OUTOF10: 6
PAINLEVEL_OUTOF10: 6
PAINLEVEL_OUTOF10: 0

## 2022-09-24 ASSESSMENT — PAIN DESCRIPTION - ORIENTATION
ORIENTATION: MID;UPPER
ORIENTATION: MID

## 2022-09-24 ASSESSMENT — PAIN - FUNCTIONAL ASSESSMENT
PAIN_FUNCTIONAL_ASSESSMENT: PREVENTS OR INTERFERES SOME ACTIVE ACTIVITIES AND ADLS
PAIN_FUNCTIONAL_ASSESSMENT: ACTIVITIES ARE NOT PREVENTED

## 2022-09-24 ASSESSMENT — PAIN DESCRIPTION - DESCRIPTORS
DESCRIPTORS: ACHING
DESCRIPTORS: ACHING;CRAMPING;DISCOMFORT;TIGHTNESS
DESCRIPTORS: ACHING
DESCRIPTORS: DISCOMFORT;SQUEEZING

## 2022-09-24 ASSESSMENT — PAIN DESCRIPTION - FREQUENCY: FREQUENCY: CONTINUOUS

## 2022-09-24 ASSESSMENT — PAIN DESCRIPTION - LOCATION
LOCATION: ABDOMEN

## 2022-09-24 ASSESSMENT — PAIN DESCRIPTION - ONSET: ONSET: ON-GOING

## 2022-09-24 NOTE — PROGRESS NOTES
END OF SHIFT SUMMARY:    Significant labs this shift:  K 3.3  Additional events this shift:   -Pt has gotten prn norco x1  -Fluid rate changed to 75 ml/hr   -Urine still brown, using the bedside commode   -Pt has been resting all night with no complaints at this time    I/Os:  09/23 1901 - 09/24 0700  In: 290 [P.O.:290]  Out: Ashley Green, RN

## 2022-09-24 NOTE — PROGRESS NOTES
Patient has remained A&O x 4. Continuous fluids discontinued per order. Pt has been drinking well, but still eating very little. -oxycodone x 1   -norco x 1  -Zofran x 3 (before meals)   -senna/docusate x 1 for constipation    Pt bathed tPatient rounded on hourly by myself or patient assistant and encouraged to call with any needs. No signs of distress noted. Bed low, locked, call bell within reach. END OF SHIFT SUMMARY:    Significant vitals this shift:  's/90s  Significant labs this shift:  potassium 3.3- received 20 mEq IV (pt not tolerating oral potassium well)   Tests performed this shift:  NA  Orders to be followed up on:  monitor nausea  Blood products given this shift:  none  Additional events this shift: pushed around aggarwal/downstairs in wheelchair by family    I/Os:  +/- this shift:   09/24 0701 - 09/24 1900  In: 7384 [P.O.:240;  I.V.:911]  Out: 400 [Urine:400] plus 3 un measurable   Occurrences this Shift:  BM 0, Emesis 0    Ace Hernandez RN

## 2022-09-25 LAB
ALBUMIN SERPL-MCNC: 1.7 G/DL (ref 3.5–5)
ALBUMIN/GLOB SERPL: 0.5 {RATIO} (ref 1.2–3.5)
ALP SERPL-CCNC: 293 U/L (ref 50–136)
ALT SERPL-CCNC: 58 U/L (ref 12–65)
ANION GAP SERPL CALC-SCNC: 5 MMOL/L (ref 4–13)
AST SERPL-CCNC: 47 U/L (ref 15–37)
BASOPHILS # BLD: 0 K/UL (ref 0–0.2)
BASOPHILS NFR BLD: 0 % (ref 0–2)
BILIRUB SERPL-MCNC: 0.7 MG/DL (ref 0.2–1.1)
BUN SERPL-MCNC: 6 MG/DL (ref 6–23)
CALCIUM SERPL-MCNC: 7.6 MG/DL (ref 8.3–10.4)
CHLORIDE SERPL-SCNC: 108 MMOL/L (ref 101–110)
CO2 SERPL-SCNC: 23 MMOL/L (ref 21–32)
CREAT SERPL-MCNC: 0.3 MG/DL (ref 0.6–1)
DIFFERENTIAL METHOD BLD: ABNORMAL
EOSINOPHIL # BLD: 0 K/UL (ref 0–0.8)
EOSINOPHIL NFR BLD: 1 % (ref 0.5–7.8)
ERYTHROCYTE [DISTWIDTH] IN BLOOD BY AUTOMATED COUNT: 16.7 % (ref 11.9–14.6)
GLOBULIN SER CALC-MCNC: 3.2 G/DL (ref 2.3–3.5)
GLUCOSE SERPL-MCNC: 80 MG/DL (ref 65–100)
HCT VFR BLD AUTO: 24.2 % (ref 35.8–46.3)
HGB BLD-MCNC: 7.5 G/DL (ref 11.7–15.4)
IMM GRANULOCYTES # BLD AUTO: 0.1 K/UL (ref 0–0.5)
IMM GRANULOCYTES NFR BLD AUTO: 2 % (ref 0–5)
LYMPHOCYTES # BLD: 0.8 K/UL (ref 0.5–4.6)
LYMPHOCYTES NFR BLD: 25 % (ref 13–44)
MAGNESIUM SERPL-MCNC: 1.7 MG/DL (ref 1.8–2.4)
MCH RBC QN AUTO: 27.2 PG (ref 26.1–32.9)
MCHC RBC AUTO-ENTMCNC: 31 G/DL (ref 31.4–35)
MCV RBC AUTO: 87.7 FL (ref 79.6–97.8)
MONOCYTES # BLD: 0.9 K/UL (ref 0.1–1.3)
MONOCYTES NFR BLD: 28 % (ref 4–12)
NEUTS SEG # BLD: 1.4 K/UL (ref 1.7–8.2)
NEUTS SEG NFR BLD: 44 % (ref 43–78)
NRBC # BLD: 0 K/UL (ref 0–0.2)
PLATELET # BLD AUTO: 376 K/UL (ref 150–450)
PMV BLD AUTO: 10.3 FL (ref 9.4–12.3)
POTASSIUM SERPL-SCNC: 3.3 MMOL/L (ref 3.5–5.1)
POTASSIUM SERPL-SCNC: 3.9 MMOL/L (ref 3.5–5.1)
PROT SERPL-MCNC: 4.9 G/DL (ref 6.3–8.2)
RBC # BLD AUTO: 2.76 M/UL (ref 4.05–5.2)
SODIUM SERPL-SCNC: 136 MMOL/L (ref 136–145)
WBC # BLD AUTO: 3.2 K/UL (ref 4.3–11.1)

## 2022-09-25 PROCEDURE — 80053 COMPREHEN METABOLIC PANEL: CPT

## 2022-09-25 PROCEDURE — C9113 INJ PANTOPRAZOLE SODIUM, VIA: HCPCS | Performed by: NURSE PRACTITIONER

## 2022-09-25 PROCEDURE — 2580000003 HC RX 258: Performed by: FAMILY MEDICINE

## 2022-09-25 PROCEDURE — 6370000000 HC RX 637 (ALT 250 FOR IP): Performed by: FAMILY MEDICINE

## 2022-09-25 PROCEDURE — 6370000000 HC RX 637 (ALT 250 FOR IP): Performed by: INTERNAL MEDICINE

## 2022-09-25 PROCEDURE — 6360000002 HC RX W HCPCS: Performed by: INTERNAL MEDICINE

## 2022-09-25 PROCEDURE — A4216 STERILE WATER/SALINE, 10 ML: HCPCS | Performed by: NURSE PRACTITIONER

## 2022-09-25 PROCEDURE — 99232 SBSQ HOSP IP/OBS MODERATE 35: CPT | Performed by: INTERNAL MEDICINE

## 2022-09-25 PROCEDURE — 6370000000 HC RX 637 (ALT 250 FOR IP): Performed by: NURSE PRACTITIONER

## 2022-09-25 PROCEDURE — 6360000002 HC RX W HCPCS: Performed by: NURSE PRACTITIONER

## 2022-09-25 PROCEDURE — 83735 ASSAY OF MAGNESIUM: CPT

## 2022-09-25 PROCEDURE — 36591 DRAW BLOOD OFF VENOUS DEVICE: CPT

## 2022-09-25 PROCEDURE — 85025 COMPLETE CBC W/AUTO DIFF WBC: CPT

## 2022-09-25 PROCEDURE — 1100000000 HC RM PRIVATE

## 2022-09-25 PROCEDURE — 2580000003 HC RX 258: Performed by: NURSE PRACTITIONER

## 2022-09-25 PROCEDURE — 84132 ASSAY OF SERUM POTASSIUM: CPT

## 2022-09-25 RX ORDER — POTASSIUM CHLORIDE 29.8 MG/ML
20 INJECTION INTRAVENOUS
Status: COMPLETED | OUTPATIENT
Start: 2022-09-25 | End: 2022-09-25

## 2022-09-25 RX ORDER — BISACODYL 10 MG
10 SUPPOSITORY, RECTAL RECTAL DAILY PRN
Status: DISCONTINUED | OUTPATIENT
Start: 2022-09-25 | End: 2022-09-27 | Stop reason: HOSPADM

## 2022-09-25 RX ADMIN — POTASSIUM CHLORIDE 20 MEQ: 29.8 INJECTION, SOLUTION INTRAVENOUS at 06:25

## 2022-09-25 RX ADMIN — METOCLOPRAMIDE 10 MG: 10 TABLET ORAL at 08:38

## 2022-09-25 RX ADMIN — MORPHINE SULFATE 15 MG: 15 TABLET, FILM COATED, EXTENDED RELEASE ORAL at 08:38

## 2022-09-25 RX ADMIN — SUCRALFATE 1 G: 1 TABLET ORAL at 08:38

## 2022-09-25 RX ADMIN — METOCLOPRAMIDE 10 MG: 10 TABLET ORAL at 17:52

## 2022-09-25 RX ADMIN — MORPHINE SULFATE 15 MG: 15 TABLET, FILM COATED, EXTENDED RELEASE ORAL at 21:52

## 2022-09-25 RX ADMIN — OXYCODONE 5 MG: 5 TABLET ORAL at 15:08

## 2022-09-25 RX ADMIN — SODIUM CHLORIDE, PRESERVATIVE FREE 10 ML: 5 INJECTION INTRAVENOUS at 22:40

## 2022-09-25 RX ADMIN — SODIUM CHLORIDE, PRESERVATIVE FREE 10 ML: 5 INJECTION INTRAVENOUS at 08:39

## 2022-09-25 RX ADMIN — BISACODYL 10 MG: 10 SUPPOSITORY RECTAL at 15:07

## 2022-09-25 RX ADMIN — SODIUM CHLORIDE, PRESERVATIVE FREE 40 MG: 5 INJECTION INTRAVENOUS at 21:52

## 2022-09-25 RX ADMIN — ONDANSETRON 4 MG: 2 INJECTION INTRAMUSCULAR; INTRAVENOUS at 14:54

## 2022-09-25 RX ADMIN — POTASSIUM CHLORIDE 20 MEQ: 29.8 INJECTION, SOLUTION INTRAVENOUS at 04:24

## 2022-09-25 RX ADMIN — POLYETHYLENE GLYCOL 3350 17 G: 17 POWDER, FOR SOLUTION ORAL at 03:04

## 2022-09-25 RX ADMIN — OXYCODONE 5 MG: 5 TABLET ORAL at 02:58

## 2022-09-25 RX ADMIN — ONDANSETRON 4 MG: 2 INJECTION INTRAMUSCULAR; INTRAVENOUS at 11:22

## 2022-09-25 RX ADMIN — ONDANSETRON 4 MG: 2 INJECTION INTRAMUSCULAR; INTRAVENOUS at 05:44

## 2022-09-25 RX ADMIN — SODIUM CHLORIDE, PRESERVATIVE FREE 40 MG: 5 INJECTION INTRAVENOUS at 08:38

## 2022-09-25 ASSESSMENT — PAIN DESCRIPTION - DESCRIPTORS
DESCRIPTORS: ACHING;SHARP
DESCRIPTORS: ACHING

## 2022-09-25 ASSESSMENT — PAIN SCALES - GENERAL
PAINLEVEL_OUTOF10: 7
PAINLEVEL_OUTOF10: 7
PAINLEVEL_OUTOF10: 6
PAINLEVEL_OUTOF10: 0

## 2022-09-25 ASSESSMENT — PAIN DESCRIPTION - ORIENTATION
ORIENTATION: MID
ORIENTATION: MID

## 2022-09-25 ASSESSMENT — PAIN DESCRIPTION - LOCATION
LOCATION: ABDOMEN
LOCATION: ABDOMEN

## 2022-09-25 NOTE — PROGRESS NOTES
[facility]        Inpatient Hematology / Oncology Progress Note      Admission Date: 2022  5:06 PM  Reason for Admission/Hospital Course: Hematemesis [K92.0]  Generalized abdominal pain [R10.84]  Intractable vomiting with nausea, unspecified vomiting type [R11.2]      24 Hour Events:  Feeling better today  Bili and LFTs improving  Likely to have pleurex placed on Monday    ROS:  Constitutional: negative for fever, chills. +weakness, malaise fatigue. CV: negative for chest pain, palpitations, edema. Respiratory: negative for dyspnea, cough, wheezing. GI: +nausea/vomiting/ab pain improved    10 point review of systems is otherwise negative with the exception of the elements mentioned above in the HPI. No Known Allergies    OBJECTIVE:  Patient Vitals for the past 8 hrs:   BP Temp Temp src Pulse Resp SpO2   22 1941 110/89 98 °F (36.7 °C) Oral 83 16 97 %   22 1817 -- -- -- -- 16 --   22 1642 (!) 134/90 97.8 °F (36.6 °C) Oral 91 17 98 %       Temp (24hrs), Av.9 °F (36.6 °C), Min:97.7 °F (36.5 °C), Max:98 °F (36.7 °C)    No intake/output data recorded. Physical Exam:  Constitutional: Well developed, thin appearing female in no acute distress, sitting comfortably in the hospital bed. HEENT: Normocephalic and atraumatic. Oropharynx is clear, mucous membranes are moist.  Extraocular muscles are intact. Sclerae anicteric. Skin Warm and dry. No bruising and no rash noted. No erythema. J   Respiratory Lungs are clear to auscultation bilaterally without wheezes, rales or rhonchi, normal air exchange without accessory muscle use. CVS Normal rate, regular rhythm    Abdomen Soft, mildly tender and distended, normoactive bowel sounds. Neuro Grossly nonfocal with no obvious sensory or motor deficits. MSK Normal range of motion in general.  No edema and no tenderness. Psych Appropriate mood and affect.         Labs:      Recent Labs     22  7158 09/23/22  0404 09/24/22  0335   WBC 4.3 3.8* 3.0*   RBC 2.69* 2.71* 2.88*   HGB 7.4* 7.3* 7.7*   HCT 23.4* 23.2* 24.3*   MCV 87.0 85.6 84.4   MCH 27.5 26.9 26.7   MCHC 31.6 31.5 31.7   RDW 16.2* 16.3* 16.7*    304 284   MPV 11.2 11.2 11.1          Recent Labs     09/22/22  0412 09/23/22  0404 09/24/22  0335    140 137   K 3.6 3.2* 3.3*    110 111*   CO2 21 22 24   BUN 11 7 4*   GFRAA >60 >60 >60   GLOB 3.2 2.9 3.3   MG 1.6* 1.6* 1.8           Imaging:  Xray Result (most recent):  XR ABDOMEN (KUB) (SINGLE AP VIEW) 09/17/2022    Narrative  KUB    CLINICAL INDICATION:  Vomiting, foul-smelling emesis, bilateral hydronephrosis  and ureteral stents. History of ascites, peritoneal carcinomatosis, unknown  primary. COMPARISON: CT 9/12/2022, radiography 3/2/2022    TECHNIQUE: AP view of the abdomen supine portable 9:22 AM    FINDINGS:  There are bilateral ureteral stents, similar in position compared  with recent CT. Generalized haziness again noted in keeping with ascites. Nonobstructive bowel gas pattern. Within limits of supine radiography no  evidence of developing free air, pneumatosis, pneumobilia, or osseous changes. Surgical suture material again projects over the stomach. There is a decreased  small amount of retained oral contrast in the GI tract. Lung bases demonstrate  no consolidation. Impression  1. No bowel obstruction evident. 2.  Ascites, bilateral ureteral stents as seen on recent CT. CT Result (most recent):  CT ABDOMEN PELVIS W IV CONTRAST 09/12/2022    Narrative  EXAMINATION: CT  ABDOMEN / PELVIS   9/12/2022 8:06 PM    ACCESSION NUMBER:  [id number]    COMPARISON: 06/22/2022    INDICATION:  abdominal pain    TECHNIQUE: Contiguous axial computed tomographic images were obtained from the  domes of the diaphragm to the symphysis pubis following administration 100 mL  Iso-behzad 370. Coronal reconstructions were also performed.  Oral contrast was also  administered. Radiation dose reduction techniques were used for this study. Our CT scanners  use one or all of the following: Automated exposure control, adjustment of the  mA and/or kV according to patient size, iterative reconstruction. FINDINGS:    Lung bases: The visualized lung bases are clear. The visualized portions of the  heart are unremarkable. Liver: Normal    Gallbladder: There is gallbladder wall thickening. The gallbladder is not  distended. Spleen: Normal    Adrenals: Normal    Pancreas: Normal    Kidneys: The kidneys enhance symmetrically. There is moderate to severe  bilateral hydronephrosis with ureteral stents in place. Bladder/: The urinary bladder is somewhat thick-walled. There is previous  hysterectomy. .    Bowel: There is large volume ascites. There is some peritoneal enhancement. There is not peritoneal nodularity. There is some nodularity within the omentum  and mesentery. There is previous gastric sleeve type procedure. The appendix is  normal.    Vessels: Normal    Abdominal wall: Intact    Bones: No suspicious lytic or blastic bony lesions. Impression  Large amount of intraabdominal ascites with mesenteric and peritoneal nodularity  as well as peritoneal enhancement is most concerning for peritoneal  carcinomatosis. Bilateral hydronephrosis with ureteral stents in place. Thick-walled urinary bladder. Correlate with evidence of cystitis. Thick-walled gallbladder is felt likely to be sympathetic. The gallbladder is  not distended.       Medications:  Current Facility-Administered Medications   Medication Dose Route Frequency    sennosides-docusate sodium (SENOKOT-S) 8.6-50 MG tablet 2 tablet  2 tablet Oral Daily PRN    lactulose (CHRONULAC) 10 GM/15ML solution 20 g  20 g Oral PRN    morphine (MS CONTIN) extended release tablet 15 mg  15 mg Oral 2 times per day    metoclopramide (REGLAN) tablet 10 mg  10 mg Oral BID AC    sucralfate (CARAFATE) tablet 1 g 1 g Oral 4 times per day    aluminum & magnesium hydroxide-simethicone (MAALOX) 200-200-20 MG/5ML suspension 30 mL  30 mL Oral Q6H PRN    LORazepam (ATIVAN) injection 1 mg  1 mg IntraVENous Q6H PRN    pantoprazole (PROTONIX) 40 mg in sodium chloride (PF) 10 mL injection  40 mg IntraVENous Q12H    ondansetron (ZOFRAN) injection 4 mg  4 mg IntraVENous Q4H PRN    promethazine (PHENERGAN) injection 6.25 mg  6.25 mg IntraMUSCular Q6H PRN    HYDROcodone-acetaminophen (NORCO) 5-325 MG per tablet 1 tablet  1 tablet Oral Q4H PRN    diatrizoate meglumine-sodium (GASTROGRAFIN) 66-10 % solution 15 mL  15 mL Oral ONCE PRN    apixaban (ELIQUIS) tablet 5 mg  5 mg Oral BID    sodium chloride flush 0.9 % injection 5-40 mL  5-40 mL IntraVENous 2 times per day    sodium chloride flush 0.9 % injection 5-40 mL  5-40 mL IntraVENous PRN    0.9 % sodium chloride infusion   IntraVENous PRN    polyethylene glycol (GLYCOLAX) packet 17 g  17 g Oral Daily PRN    acetaminophen (TYLENOL) tablet 650 mg  650 mg Oral Q6H PRN    Or    acetaminophen (TYLENOL) suppository 650 mg  650 mg Rectal Q6H PRN    potassium chloride (KLOR-CON M) extended release tablet 40 mEq  40 mEq Oral PRN    Or    potassium bicarb-citric acid (EFFER-K) effervescent tablet 40 mEq  40 mEq Oral PRN    Or    potassium chloride 10 mEq/100 mL IVPB (Peripheral Line)  10 mEq IntraVENous PRN    magnesium sulfate 2000 mg in 50 mL IVPB premix  2,000 mg IntraVENous PRN    oxyCODONE (ROXICODONE) immediate release tablet 5 mg  5 mg Oral Q4H PRN     Ms. Gaurav Delgado is a 52 y.o. female with medical history of PE on Eliquis and metastatic carcinoma of unknown primary followed by Dr. Henry Alicea currently sp cycle 9 FOLFOX on 9/8/2022. She had ex-lap on 8/2022 to eval for debulking but tumor was found to be tightly adherent and impossible to debulk. She presented to ED with 1 day duration of hematemesis.  Pt reported she has been feeling abdominal distention that has progressively worsened over 1 week duration associated with mild abdominal discomfort. She stated that yesterday she vomited up what looked like bloody substance however she was unsure as she has drank red Gatorade earlier in the day. Per notes she had another episode of emesis in ED however at that time was nonbloody. CT AP shows large amount of intra-abdominal ascites with mesenteric/peritoneal nodularity concerning for peritoneal carcinomatosis; bilateral hydronephrosis with ureteral stents in place; thick-walled urinary bladder; thick-walled gallbladder felt to be sympathetic and not distended. IR is consulted for paracentesis with studies ordered. Eliquis is on hold and GI is consulted. PLAN:  CUP now with large volume ascites  -sp cycle 9 FOLFOX on 9/8/2022.  - CT AP shows large amount of intra-abdominal ascites with mesenteric/peritoneal nodularity concerning for peritoneal carcinomatosis; bilateral hydronephrosis with ureteral stents in place; thick-walled urinary bladder; thick-walled gallbladder felt to be sympathetic and not distended.  -Para with studies tomorrow-EGD today  9/14 EGD negative for bleed. Para -3800 studies ordered. Advance diet. 9/15 having nausea-antiemetics  9/16 complaining of acid reflux-added one time IV Pepcid-already on PO protonix bid  9/20 may need another para-follow up on AB US but may need repeat CT to evaluate  9/21 AB US: Cirrhotic liver. Intrahepatic, and borderline extra hepatic biliary ductal dilation. Moderate complex ascites which at times appears septated. Signs or symptoms of infection (i.e. peritonitis) should be excluded. Large bilateral renal stones. In addition, there is moderate right hydronephrosis, and moderate to severe left hydronephrosis of uncertain acuity. Fluid within the dilated left renal pelvis appears complex demonstrating debris. This could be an indicator of blood or infection. Recommend correlation with clinical exam and urinalysis findings.  Cholelithiasis and gallbladder wall thickening. Gallbladder wall thickening is nonspecific in the setting of ascites. Signs/symptoms of potential cholecystitis should be excluded. This can be further assessed with a nuclear medicine hepatobiliary scan if clinically indicated. Consult IR for possible para. Urology consulted as well.   9/22 no changes in plan per urology currently-notifying Dr. Henrique Hernández as he may want to upsize stents at next exchange. MRI AB: No significant intrahepatic or extrahepatic bile duct dilatation. No common bile duct stone is appreciated. Questionable tiny filling defect within the right main hepatic duct. Cholelithiasis. Nonspecific gallbladder wall thickening as described on prior ultrasound. Marked bilateral hydronephrosis with indwelling ureteral stents. Large volume of ascites. Cirrhotic liver morphology. LFTs improved. Bili improved. Para removing ~1L today-likely will plan on pleuex cath on Monday        History of PE  -Eliquis on hold due to hematemesis  9/14 resume Eliquis     Hematemesis / Nausea, vomiting  -PPI BID  -GI planning EGD today  -Hgb 8.9 (11.8)  9/14 hgb stable 9.2  9/17 ongoing vomiting. Emend given yesterday. Increase Zofran to 4mg q 4 hours prn and increase Ativan to 1mg q 6 hours prn. Consider marinol if no improvement. KUB neg. No evidence of bleeding. States she feels like something is stuck in her esophagus. Reconsult GI.  9/18 GI added prn phenergan. Awaiting barium study. GI following. 9/19 Barium esophagram-No evidence of leak or obstruction. Possible inflammation in the proximal jejunum. GI added carafate. We considered adding zyprexa but interacts to reglan. 9/20 taper reglan dt ellevated LFT and bili    Elevated creatinine  9/16 added fluids NS @ 75/hr  9/18 Cr down to 0.7  RESOLVED    UTI  9/16 Noted cystitis on CT. UA on 9/13 c/w UTI - not treated. Repeat UA (c/w UTI). Ucx ordered. Start CTX  9/17 Ucx pending. Con't CTX (D2).  9/18 Ucx-NGTD. Completes CTX today.   9/22 UA neg for infection. UC NTD    Continue home meds  Andres sOPs  SCDs for DVT ppx    Goals and plan of care reviewed with the patient. All questions answered to the best of our ability. Disposition:  Anticipate discharge home once sx controlled. PT recommends HH at discharge.             Angel Martini MD  [facility]  [address] [place] [address]  Office : [phone]  Fax : [phone]

## 2022-09-25 NOTE — PROGRESS NOTES
[facility] Hematology & Oncology        Inpatient Hematology / Oncology Progress Note      Admission Date: 2022  5:06 PM  Reason for Admission/Hospital Course: Hematemesis [K92.0]  Generalized abdominal pain [R10.84]  Intractable vomiting with nausea, unspecified vomiting type [R11.2]      24 Hour Events:  Afeb, VSS  N/V x 1 this AM  Likely to have pleurex placed on Monday    ROS:  Constitutional: negative for fever, chills. +weakness, malaise fatigue. CV: negative for chest pain, palpitations, edema. Respiratory: negative for dyspnea, cough, wheezing. GI: +nausea/vomiting/ab pain improved    10 point review of systems is otherwise negative with the exception of the elements mentioned above in the HPI. No Known Allergies    OBJECTIVE:  Patient Vitals for the past 8 hrs:   BP Temp Temp src Pulse Resp SpO2   22 1040 127/88 98.2 °F (36.8 °C) Oral 89 18 96 %   22 0728 113/79 98.1 °F (36.7 °C) Oral 81 16 99 %     Temp (24hrs), Av.9 °F (36.6 °C), Min:97.3 °F (36.3 °C), Max:98.2 °F (36.8 °C)    No intake/output data recorded. Physical Exam:  Constitutional: Well developed, thin appearing female in no acute distress, sitting comfortably in the hospital bed. HEENT: Normocephalic and atraumatic. Oropharynx is clear, mucous membranes are moist.  Extraocular muscles are intact. Sclerae anicteric. Skin Warm and dry. No bruising and no rash noted. No erythema. J   Respiratory Lungs are clear to auscultation bilaterally without wheezes, rales or rhonchi, normal air exchange without accessory muscle use. CVS Normal rate, regular rhythm    Abdomen Soft, mildly tender and distended, normoactive bowel sounds. Neuro Grossly nonfocal with no obvious sensory or motor deficits. MSK Normal range of motion in general.  No edema and no tenderness. Psych Appropriate mood and affect.         Labs:      Recent Labs     22  0404 22  0335 22  0246   WBC 3.8* 3.0* 3.2*   RBC 2.71* 2.88* 2.76*   HGB 7.3* 7.7* 7.5*   HCT 23.2* 24.3* 24.2*   MCV 85.6 84.4 87.7   MCH 26.9 26.7 27.2   MCHC 31.5 31.7 31.0*   RDW 16.3* 16.7* 16.7*    284 376   MPV 11.2 11.1 10.3        Recent Labs     09/23/22  0404 09/24/22  0335 09/25/22  0246    137 136   K 3.2* 3.3* 3.3*    111* 108   CO2 22 24 23   BUN 7 4* 6   GFRAA >60 >60 >60   GLOB 2.9 3.3 3.2   MG 1.6* 1.8 1.7*         Imaging:  Xray Result (most recent):  XR ABDOMEN (KUB) (SINGLE AP VIEW) 09/17/2022    Narrative  KUB    CLINICAL INDICATION:  Vomiting, foul-smelling emesis, bilateral hydronephrosis  and ureteral stents. History of ascites, peritoneal carcinomatosis, unknown  primary. COMPARISON: CT 9/12/2022, radiography 3/2/2022    TECHNIQUE: AP view of the abdomen supine portable 9:22 AM    FINDINGS:  There are bilateral ureteral stents, similar in position compared  with recent CT. Generalized haziness again noted in keeping with ascites. Nonobstructive bowel gas pattern. Within limits of supine radiography no  evidence of developing free air, pneumatosis, pneumobilia, or osseous changes. Surgical suture material again projects over the stomach. There is a decreased  small amount of retained oral contrast in the GI tract. Lung bases demonstrate  no consolidation. Impression  1. No bowel obstruction evident. 2.  Ascites, bilateral ureteral stents as seen on recent CT. CT Result (most recent):  CT ABDOMEN PELVIS W IV CONTRAST 09/12/2022    Narrative  EXAMINATION: CT  ABDOMEN / PELVIS   9/12/2022 8:06 PM    ACCESSION NUMBER:  [id number]    COMPARISON: 06/22/2022    INDICATION:  abdominal pain    TECHNIQUE: Contiguous axial computed tomographic images were obtained from the  domes of the diaphragm to the symphysis pubis following administration 100 mL  Iso-behzad 370. Coronal reconstructions were also performed. Oral contrast was also  administered. Radiation dose reduction techniques were used for this study. Our CT scanners  use one or all of the following: Automated exposure control, adjustment of the  mA and/or kV according to patient size, iterative reconstruction. FINDINGS:    Lung bases: The visualized lung bases are clear. The visualized portions of the  heart are unremarkable. Liver: Normal    Gallbladder: There is gallbladder wall thickening. The gallbladder is not  distended. Spleen: Normal    Adrenals: Normal    Pancreas: Normal    Kidneys: The kidneys enhance symmetrically. There is moderate to severe  bilateral hydronephrosis with ureteral stents in place. Bladder/: The urinary bladder is somewhat thick-walled. There is previous  hysterectomy. .    Bowel: There is large volume ascites. There is some peritoneal enhancement. There is not peritoneal nodularity. There is some nodularity within the omentum  and mesentery. There is previous gastric sleeve type procedure. The appendix is  normal.    Vessels: Normal    Abdominal wall: Intact    Bones: No suspicious lytic or blastic bony lesions. Impression  Large amount of intraabdominal ascites with mesenteric and peritoneal nodularity  as well as peritoneal enhancement is most concerning for peritoneal  carcinomatosis. Bilateral hydronephrosis with ureteral stents in place. Thick-walled urinary bladder. Correlate with evidence of cystitis. Thick-walled gallbladder is felt likely to be sympathetic. The gallbladder is  not distended.       Medications:  Current Facility-Administered Medications   Medication Dose Route Frequency    sennosides-docusate sodium (SENOKOT-S) 8.6-50 MG tablet 2 tablet  2 tablet Oral Daily PRN    lactulose (CHRONULAC) 10 GM/15ML solution 20 g  20 g Oral PRN    morphine (MS CONTIN) extended release tablet 15 mg  15 mg Oral 2 times per day    metoclopramide (REGLAN) tablet 10 mg  10 mg Oral BID AC    sucralfate (CARAFATE) tablet 1 g  1 g Oral 4 times per day    aluminum & magnesium hydroxide-simethicone (MAALOX) 279-023-01 MG/5ML suspension 30 mL  30 mL Oral Q6H PRN    LORazepam (ATIVAN) injection 1 mg  1 mg IntraVENous Q6H PRN    pantoprazole (PROTONIX) 40 mg in sodium chloride (PF) 10 mL injection  40 mg IntraVENous Q12H    ondansetron (ZOFRAN) injection 4 mg  4 mg IntraVENous Q4H PRN    promethazine (PHENERGAN) injection 6.25 mg  6.25 mg IntraMUSCular Q6H PRN    HYDROcodone-acetaminophen (NORCO) 5-325 MG per tablet 1 tablet  1 tablet Oral Q4H PRN    diatrizoate meglumine-sodium (GASTROGRAFIN) 66-10 % solution 15 mL  15 mL Oral ONCE PRN    [Held by provider] apixaban (ELIQUIS) tablet 5 mg  5 mg Oral BID    sodium chloride flush 0.9 % injection 5-40 mL  5-40 mL IntraVENous 2 times per day    sodium chloride flush 0.9 % injection 5-40 mL  5-40 mL IntraVENous PRN    0.9 % sodium chloride infusion   IntraVENous PRN    polyethylene glycol (GLYCOLAX) packet 17 g  17 g Oral Daily PRN    acetaminophen (TYLENOL) tablet 650 mg  650 mg Oral Q6H PRN    Or    acetaminophen (TYLENOL) suppository 650 mg  650 mg Rectal Q6H PRN    potassium chloride (KLOR-CON M) extended release tablet 40 mEq  40 mEq Oral PRN    Or    potassium bicarb-citric acid (EFFER-K) effervescent tablet 40 mEq  40 mEq Oral PRN    Or    potassium chloride 10 mEq/100 mL IVPB (Peripheral Line)  10 mEq IntraVENous PRN    magnesium sulfate 2000 mg in 50 mL IVPB premix  2,000 mg IntraVENous PRN    oxyCODONE (ROXICODONE) immediate release tablet 5 mg  5 mg Oral Q4H PRN     Ms. Miranda Diez is a 52 y.o. female with medical history of PE on Eliquis and metastatic carcinoma of unknown primary followed by Dr. Karen Mendiola currently sp cycle 9 FOLFOX on 9/8/2022. She had ex-lap on 8/2022 to eval for debulking but tumor was found to be tightly adherent and impossible to debulk. She presented to ED with 1 day duration of hematemesis. Pt reported she has been feeling abdominal distention that has progressively worsened over 1 week duration associated with mild abdominal discomfort.  She stated that yesterday she vomited up what looked like bloody substance however she was unsure as she has drank red Gatorade earlier in the day. Per notes she had another episode of emesis in ED however at that time was nonbloody. CT AP shows large amount of intra-abdominal ascites with mesenteric/peritoneal nodularity concerning for peritoneal carcinomatosis; bilateral hydronephrosis with ureteral stents in place; thick-walled urinary bladder; thick-walled gallbladder felt to be sympathetic and not distended. IR is consulted for paracentesis with studies ordered. Eliquis is on hold and GI is consulted. PLAN:  CUP now with large volume ascites  -sp cycle 9 FOLFOX on 9/8/2022.  - CT AP shows large amount of intra-abdominal ascites with mesenteric/peritoneal nodularity concerning for peritoneal carcinomatosis; bilateral hydronephrosis with ureteral stents in place; thick-walled urinary bladder; thick-walled gallbladder felt to be sympathetic and not distended.  -Para with studies tomorrow-EGD today  9/14 EGD negative for bleed. Para -3800 studies ordered. Advance diet. 9/15 having nausea-antiemetics  9/16 complaining of acid reflux-added one time IV Pepcid-already on PO protonix bid  9/20 may need another para-follow up on AB US but may need repeat CT to evaluate  9/21 AB US: Cirrhotic liver. Intrahepatic, and borderline extra hepatic biliary ductal dilation. Moderate complex ascites which at times appears septated. Signs or symptoms of infection (i.e. peritonitis) should be excluded. Large bilateral renal stones. In addition, there is moderate right hydronephrosis, and moderate to severe left hydronephrosis of uncertain acuity. Fluid within the dilated left renal pelvis appears complex demonstrating debris. This could be an indicator of blood or infection. Recommend correlation with clinical exam and urinalysis findings. Cholelithiasis and gallbladder wall thickening.  Gallbladder wall thickening is nonspecific in the setting of ascites. Signs/symptoms of potential cholecystitis should be excluded. This can be further assessed with a nuclear medicine hepatobiliary scan if clinically indicated. Consult IR for possible para. Urology consulted as well.   9/22 no changes in plan per urology currently-notifying Dr. Saniya Prieto as he may want to upsize stents at next exchange. MRI AB: No significant intrahepatic or extrahepatic bile duct dilatation. No common bile duct stone is appreciated. Questionable tiny filling defect within the right main hepatic duct. Cholelithiasis. Nonspecific gallbladder wall thickening as described on prior ultrasound. Marked bilateral hydronephrosis with indwelling ureteral stents. Large volume of ascites. Cirrhotic liver morphology. LFTs improved. Bili improved. Para removing ~1L today-likely will plan on pleuex cath on Monday        History of PE  -Eliquis on hold due to hematemesis  9/14 resume Eliquis  9/25 hold for PleurX tomorrow     Hematemesis / Nausea, vomiting  -PPI BID  -GI planning EGD today  -Hgb 8.9 (11.8)  9/14 hgb stable 9.2  9/17 ongoing vomiting. Emend given yesterday. Increase Zofran to 4mg q 4 hours prn and increase Ativan to 1mg q 6 hours prn. Consider marinol if no improvement. KUB neg. No evidence of bleeding. States she feels like something is stuck in her esophagus. Reconsult GI.  9/18 GI added prn phenergan. Awaiting barium study. GI following. 9/19 Barium esophagram-No evidence of leak or obstruction. Possible inflammation in the proximal jejunum. GI added carafate. We considered adding zyprexa but interacts to reglan. 9/20 taper reglan dt ellevated LFT and bili  9/25 N/V x 1 this AM    Elevated creatinine  9/16 added fluids NS @ 75/hr  9/18 Cr down to 0.7  RESOLVED    UTI  9/16 Noted cystitis on CT. UA on 9/13 c/w UTI - not treated. Repeat UA (c/w UTI). Ucx ordered. Start CTX  9/17 Ucx pending. Con't CTX (D2).  9/18 Ucx-NGTD.   Completes CTX today.  9/22 UA neg for infection. UC NTD    Continue home meds  Andres sOPs  SCDs for DVT ppx    Goals and plan of care reviewed with the patient. All questions answered to the best of our ability. Disposition:  Anticipate discharge home once sx controlled. PT recommends HH at discharge.             SC[facility] [address]ology [facility] On[facility]  [address] [address]  Office : [phone]  Fax : [phone]

## 2022-09-26 ENCOUNTER — APPOINTMENT (OUTPATIENT)
Dept: INTERVENTIONAL RADIOLOGY/VASCULAR | Age: 47
DRG: 374 | End: 2022-09-26
Payer: COMMERCIAL

## 2022-09-26 LAB
ALBUMIN SERPL-MCNC: 1.6 G/DL (ref 3.5–5)
ALBUMIN/GLOB SERPL: 0.5 {RATIO} (ref 1.2–3.5)
ALP SERPL-CCNC: 313 U/L (ref 50–136)
ALT SERPL-CCNC: 50 U/L (ref 12–65)
ANION GAP SERPL CALC-SCNC: 4 MMOL/L (ref 4–13)
AST SERPL-CCNC: 46 U/L (ref 15–37)
BASOPHILS # BLD: 0 K/UL (ref 0–0.2)
BASOPHILS NFR BLD: 1 % (ref 0–2)
BILIRUB SERPL-MCNC: 0.6 MG/DL (ref 0.2–1.1)
BUN SERPL-MCNC: 7 MG/DL (ref 6–23)
CALCIUM SERPL-MCNC: 7.6 MG/DL (ref 8.3–10.4)
CHLORIDE SERPL-SCNC: 109 MMOL/L (ref 101–110)
CO2 SERPL-SCNC: 22 MMOL/L (ref 21–32)
CREAT SERPL-MCNC: 0.5 MG/DL (ref 0.6–1)
DIFFERENTIAL METHOD BLD: ABNORMAL
EOSINOPHIL # BLD: 0 K/UL (ref 0–0.8)
EOSINOPHIL NFR BLD: 0 % (ref 0.5–7.8)
ERYTHROCYTE [DISTWIDTH] IN BLOOD BY AUTOMATED COUNT: 16.7 % (ref 11.9–14.6)
GLOBULIN SER CALC-MCNC: 3.1 G/DL (ref 2.3–3.5)
GLUCOSE SERPL-MCNC: 72 MG/DL (ref 65–100)
HCT VFR BLD AUTO: 21.6 % (ref 35.8–46.3)
HCT VFR BLD AUTO: 26.5 % (ref 35.8–46.3)
HGB BLD-MCNC: 6.7 G/DL (ref 11.7–15.4)
HGB BLD-MCNC: 8.5 G/DL (ref 11.7–15.4)
HISTORY CHECK: NORMAL
IMM GRANULOCYTES # BLD AUTO: 0.1 K/UL (ref 0–0.5)
IMM GRANULOCYTES NFR BLD AUTO: 3 % (ref 0–5)
LYMPHOCYTES # BLD: 0.7 K/UL (ref 0.5–4.6)
LYMPHOCYTES NFR BLD: 20 % (ref 13–44)
MAGNESIUM SERPL-MCNC: 1.8 MG/DL (ref 1.8–2.4)
MCH RBC QN AUTO: 27.5 PG (ref 26.1–32.9)
MCHC RBC AUTO-ENTMCNC: 31 G/DL (ref 31.4–35)
MCV RBC AUTO: 88.5 FL (ref 79.6–97.8)
MONOCYTES # BLD: 0.9 K/UL (ref 0.1–1.3)
MONOCYTES NFR BLD: 24 % (ref 4–12)
NEUTS SEG # BLD: 2 K/UL (ref 1.7–8.2)
NEUTS SEG NFR BLD: 53 % (ref 43–78)
NRBC # BLD: 0 K/UL (ref 0–0.2)
PLATELET # BLD AUTO: 365 K/UL (ref 150–450)
PMV BLD AUTO: 10.4 FL (ref 9.4–12.3)
POTASSIUM SERPL-SCNC: 3.6 MMOL/L (ref 3.5–5.1)
PROT SERPL-MCNC: 4.7 G/DL (ref 6.3–8.2)
RBC # BLD AUTO: 2.44 M/UL (ref 4.05–5.2)
SODIUM SERPL-SCNC: 135 MMOL/L (ref 136–145)
WBC # BLD AUTO: 3.7 K/UL (ref 4.3–11.1)

## 2022-09-26 PROCEDURE — 1100000000 HC RM PRIVATE

## 2022-09-26 PROCEDURE — 2709999900 IR GUIDED PERC PLEURAL DRAIN W CATH INSERT

## 2022-09-26 PROCEDURE — 85018 HEMOGLOBIN: CPT

## 2022-09-26 PROCEDURE — 85025 COMPLETE CBC W/AUTO DIFF WBC: CPT

## 2022-09-26 PROCEDURE — 2500000003 HC RX 250 WO HCPCS: Performed by: PHYSICIAN ASSISTANT

## 2022-09-26 PROCEDURE — 83735 ASSAY OF MAGNESIUM: CPT

## 2022-09-26 PROCEDURE — A4216 STERILE WATER/SALINE, 10 ML: HCPCS | Performed by: NURSE PRACTITIONER

## 2022-09-26 PROCEDURE — 6370000000 HC RX 637 (ALT 250 FOR IP): Performed by: INTERNAL MEDICINE

## 2022-09-26 PROCEDURE — 86923 COMPATIBILITY TEST ELECTRIC: CPT

## 2022-09-26 PROCEDURE — P9040 RBC LEUKOREDUCED IRRADIATED: HCPCS

## 2022-09-26 PROCEDURE — 6370000000 HC RX 637 (ALT 250 FOR IP): Performed by: FAMILY MEDICINE

## 2022-09-26 PROCEDURE — 2580000003 HC RX 258: Performed by: RADIOLOGY

## 2022-09-26 PROCEDURE — 2580000003 HC RX 258: Performed by: FAMILY MEDICINE

## 2022-09-26 PROCEDURE — 6360000002 HC RX W HCPCS: Performed by: NURSE PRACTITIONER

## 2022-09-26 PROCEDURE — 6360000002 HC RX W HCPCS: Performed by: RADIOLOGY

## 2022-09-26 PROCEDURE — 30233N1 TRANSFUSION OF NONAUTOLOGOUS RED BLOOD CELLS INTO PERIPHERAL VEIN, PERCUTANEOUS APPROACH: ICD-10-PCS | Performed by: INTERNAL MEDICINE

## 2022-09-26 PROCEDURE — 36430 TRANSFUSION BLD/BLD COMPNT: CPT

## 2022-09-26 PROCEDURE — 2580000003 HC RX 258: Performed by: NURSE PRACTITIONER

## 2022-09-26 PROCEDURE — 86644 CMV ANTIBODY: CPT

## 2022-09-26 PROCEDURE — C9113 INJ PANTOPRAZOLE SODIUM, VIA: HCPCS | Performed by: NURSE PRACTITIONER

## 2022-09-26 PROCEDURE — 2580000003 HC RX 258: Performed by: PHYSICIAN ASSISTANT

## 2022-09-26 PROCEDURE — 86901 BLOOD TYPING SEROLOGIC RH(D): CPT

## 2022-09-26 PROCEDURE — 0W9G30Z DRAINAGE OF PERITONEAL CAVITY WITH DRAINAGE DEVICE, PERCUTANEOUS APPROACH: ICD-10-PCS | Performed by: RADIOLOGY

## 2022-09-26 PROCEDURE — 6370000000 HC RX 637 (ALT 250 FOR IP): Performed by: NURSE PRACTITIONER

## 2022-09-26 PROCEDURE — 36591 DRAW BLOOD OFF VENOUS DEVICE: CPT

## 2022-09-26 PROCEDURE — 80053 COMPREHEN METABOLIC PANEL: CPT

## 2022-09-26 PROCEDURE — 99232 SBSQ HOSP IP/OBS MODERATE 35: CPT | Performed by: INTERNAL MEDICINE

## 2022-09-26 RX ORDER — FENTANYL CITRATE 50 UG/ML
INJECTION, SOLUTION INTRAMUSCULAR; INTRAVENOUS
Status: COMPLETED | OUTPATIENT
Start: 2022-09-26 | End: 2022-09-26

## 2022-09-26 RX ORDER — LIDOCAINE HYDROCHLORIDE AND EPINEPHRINE 10; 10 MG/ML; UG/ML
INJECTION, SOLUTION INFILTRATION; PERINEURAL
Status: COMPLETED | OUTPATIENT
Start: 2022-09-26 | End: 2022-09-26

## 2022-09-26 RX ORDER — SODIUM CHLORIDE 9 MG/ML
INJECTION, SOLUTION INTRAVENOUS PRN
Status: DISCONTINUED | OUTPATIENT
Start: 2022-09-26 | End: 2022-09-27 | Stop reason: HOSPADM

## 2022-09-26 RX ORDER — SENNA AND DOCUSATE SODIUM 50; 8.6 MG/1; MG/1
2 TABLET, FILM COATED ORAL DAILY PRN
Qty: 90 TABLET | Refills: 0 | Status: SHIPPED | OUTPATIENT
Start: 2022-09-26

## 2022-09-26 RX ORDER — METOCLOPRAMIDE 10 MG/1
10 TABLET ORAL
Qty: 120 TABLET | Refills: 3 | Status: SHIPPED | OUTPATIENT
Start: 2022-09-26 | End: 2022-09-27 | Stop reason: HOSPADM

## 2022-09-26 RX ORDER — DIPHENHYDRAMINE HCL 25 MG
25 CAPSULE ORAL EVERY 6 HOURS PRN
Status: DISCONTINUED | OUTPATIENT
Start: 2022-09-26 | End: 2022-09-27 | Stop reason: HOSPADM

## 2022-09-26 RX ORDER — MIDAZOLAM HYDROCHLORIDE 1 MG/ML
INJECTION INTRAMUSCULAR; INTRAVENOUS
Status: COMPLETED | OUTPATIENT
Start: 2022-09-26 | End: 2022-09-26

## 2022-09-26 RX ORDER — SODIUM CHLORIDE 9 MG/ML
INJECTION, SOLUTION INTRAVENOUS CONTINUOUS PRN
Status: COMPLETED | OUTPATIENT
Start: 2022-09-26 | End: 2022-09-26

## 2022-09-26 RX ORDER — LIDOCAINE HYDROCHLORIDE 10 MG/ML
INJECTION, SOLUTION EPIDURAL; INFILTRATION; INTRACAUDAL; PERINEURAL
Status: COMPLETED | OUTPATIENT
Start: 2022-09-26 | End: 2022-09-26

## 2022-09-26 RX ORDER — POLYETHYLENE GLYCOL 3350 17 G/17G
17 POWDER, FOR SOLUTION ORAL DAILY PRN
Qty: 527 G | Refills: 1 | Status: SHIPPED | OUTPATIENT
Start: 2022-09-26 | End: 2022-10-26

## 2022-09-26 RX ORDER — SUCRALFATE 1 G/1
1 TABLET ORAL 4 TIMES DAILY
Qty: 120 TABLET | Refills: 3 | Status: SHIPPED | OUTPATIENT
Start: 2022-09-26

## 2022-09-26 RX ADMIN — OXYCODONE 5 MG: 5 TABLET ORAL at 18:04

## 2022-09-26 RX ADMIN — MIDAZOLAM 1 MG: 1 INJECTION INTRAMUSCULAR; INTRAVENOUS at 15:42

## 2022-09-26 RX ADMIN — LIDOCAINE HYDROCHLORIDE,EPINEPHRINE BITARTRATE 9 ML: 10; .01 INJECTION, SOLUTION INFILTRATION; PERINEURAL at 15:47

## 2022-09-26 RX ADMIN — SUCRALFATE 1 G: 1 TABLET ORAL at 11:58

## 2022-09-26 RX ADMIN — METOCLOPRAMIDE 10 MG: 10 TABLET ORAL at 18:01

## 2022-09-26 RX ADMIN — MORPHINE SULFATE 15 MG: 15 TABLET, FILM COATED, EXTENDED RELEASE ORAL at 08:48

## 2022-09-26 RX ADMIN — ONDANSETRON 4 MG: 2 INJECTION INTRAMUSCULAR; INTRAVENOUS at 13:48

## 2022-09-26 RX ADMIN — SODIUM CHLORIDE, PRESERVATIVE FREE 40 MG: 5 INJECTION INTRAVENOUS at 08:48

## 2022-09-26 RX ADMIN — ONDANSETRON 4 MG: 2 INJECTION INTRAMUSCULAR; INTRAVENOUS at 09:30

## 2022-09-26 RX ADMIN — FENTANYL CITRATE 50 MCG: 50 INJECTION INTRAMUSCULAR; INTRAVENOUS at 15:38

## 2022-09-26 RX ADMIN — SODIUM CHLORIDE, PRESERVATIVE FREE 10 ML: 5 INJECTION INTRAVENOUS at 21:05

## 2022-09-26 RX ADMIN — SODIUM CHLORIDE 50 ML/HR: 9 INJECTION, SOLUTION INTRAVENOUS at 15:37

## 2022-09-26 RX ADMIN — SODIUM CHLORIDE 50 ML/HR: 900 INJECTION, SOLUTION INTRAVENOUS at 15:38

## 2022-09-26 RX ADMIN — ACETAMINOPHEN 650 MG: 325 TABLET, FILM COATED ORAL at 08:51

## 2022-09-26 RX ADMIN — FENTANYL CITRATE 50 MCG: 50 INJECTION INTRAMUSCULAR; INTRAVENOUS at 15:42

## 2022-09-26 RX ADMIN — METOCLOPRAMIDE 10 MG: 10 TABLET ORAL at 06:48

## 2022-09-26 RX ADMIN — LIDOCAINE HYDROCHLORIDE,EPINEPHRINE BITARTRATE 9 ML: 10; .01 INJECTION, SOLUTION INFILTRATION; PERINEURAL at 15:41

## 2022-09-26 RX ADMIN — SODIUM CHLORIDE, PRESERVATIVE FREE 40 MG: 5 INJECTION INTRAVENOUS at 21:03

## 2022-09-26 RX ADMIN — SUCRALFATE 1 G: 1 TABLET ORAL at 06:48

## 2022-09-26 RX ADMIN — SODIUM CHLORIDE, PRESERVATIVE FREE 20 ML: 5 INJECTION INTRAVENOUS at 08:50

## 2022-09-26 RX ADMIN — MORPHINE SULFATE 15 MG: 15 TABLET, FILM COATED, EXTENDED RELEASE ORAL at 21:04

## 2022-09-26 RX ADMIN — MIDAZOLAM 1 MG: 1 INJECTION INTRAMUSCULAR; INTRAVENOUS at 15:38

## 2022-09-26 RX ADMIN — OXYCODONE 5 MG: 5 TABLET ORAL at 23:17

## 2022-09-26 RX ADMIN — DIPHENHYDRAMINE HYDROCHLORIDE 25 MG: 25 CAPSULE ORAL at 08:51

## 2022-09-26 ASSESSMENT — PAIN DESCRIPTION - LOCATION
LOCATION: ABDOMEN

## 2022-09-26 ASSESSMENT — PAIN SCALES - GENERAL
PAINLEVEL_OUTOF10: 6
PAINLEVEL_OUTOF10: 7
PAINLEVEL_OUTOF10: 5
PAINLEVEL_OUTOF10: 7
PAINLEVEL_OUTOF10: 5

## 2022-09-26 ASSESSMENT — PAIN DESCRIPTION - ONSET
ONSET: ON-GOING
ONSET: ON-GOING

## 2022-09-26 ASSESSMENT — PAIN DESCRIPTION - ORIENTATION
ORIENTATION: RIGHT;MID
ORIENTATION: MID
ORIENTATION: RIGHT
ORIENTATION: RIGHT

## 2022-09-26 ASSESSMENT — PAIN - FUNCTIONAL ASSESSMENT
PAIN_FUNCTIONAL_ASSESSMENT: ACTIVITIES ARE NOT PREVENTED
PAIN_FUNCTIONAL_ASSESSMENT: ACTIVITIES ARE NOT PREVENTED
PAIN_FUNCTIONAL_ASSESSMENT: NONE - DENIES PAIN
PAIN_FUNCTIONAL_ASSESSMENT: ACTIVITIES ARE NOT PREVENTED
PAIN_FUNCTIONAL_ASSESSMENT: ACTIVITIES ARE NOT PREVENTED

## 2022-09-26 ASSESSMENT — PAIN DESCRIPTION - DESCRIPTORS
DESCRIPTORS: ACHING

## 2022-09-26 ASSESSMENT — PAIN DESCRIPTION - PAIN TYPE
TYPE: CHRONIC PAIN
TYPE: CHRONIC PAIN;SURGICAL PAIN

## 2022-09-26 ASSESSMENT — PAIN DESCRIPTION - FREQUENCY
FREQUENCY: CONTINUOUS
FREQUENCY: CONTINUOUS

## 2022-09-26 NOTE — PRE SEDATION
Sedation Pre-Procedure Note    Patient Name: Caty Raphael   YOB: 1975  Room/Bed: [facility]  Medical Record Number: [id number]  Date: 2022   Time: 3:00 PM       Indication:  ascites    Consent: I have discussed with the patient and/or the patient representative the indication, alternatives, and the possible risks and/or complications of the planned procedure and the anesthesia methods. The patient and/or patient representative appear to understand and agree to proceed. Vital Signs:   Vitals:    22 1140   BP: 125/85   Pulse: 72   Resp: 17   Temp: 99.4 °F (37.4 °C)   SpO2: 98%       Past Medical History:   has a past medical history of Anemia, Colon cancer ([address] COVID-19, GERD (gastroesophageal reflux disease), History of colonoscopy, Hx of blood clots, Hypotension, Obstruction of fallopian tube, Peritoneal carcinomatosis ([address] and Weight loss. Past Surgical History:   has a past surgical history that includes Upper gastrointestinal endoscopy; Urological Surgery (Bilateral, 03/15/2022);  section (); Gastric bypass surgery (2014); Tonsillectomy; IR PORT PLACEMENT > 5 YEARS (2022); Hysterectomy; myomectomy (age \"early 21s\"); Hysterectomy (2020); IR PORT PLACEMENT > 5 YEARS (2022); Cystoscopy (Bilateral, 2022); Cystoscopy (Bilateral, 2022); laparotomy (N/A, 2022); and Upper gastrointestinal endoscopy (N/A, 2022).     Medications:   Scheduled Meds:    morphine  15 mg Oral 2 times per day    metoclopramide  10 mg Oral BID AC    sucralfate  1 g Oral 4 times per day    pantoprazole (PROTONIX) 40 mg injection  40 mg IntraVENous Q12H    [Held by provider] apixaban  5 mg Oral BID    sodium chloride flush  5-40 mL IntraVENous 2 times per day     Continuous Infusions:    sodium chloride      sodium chloride 25 mL/hr at 22 1800     PRN Meds: sodium chloride, diphenhydrAMINE, bisacodyl, sennosides-docusate sodium, lactulose, aluminum & magnesium hydroxide-simethicone, LORazepam, ondansetron, promethazine, diatrizoate meglumine-sodium, sodium chloride flush, sodium chloride, polyethylene glycol, acetaminophen **OR** acetaminophen, potassium chloride **OR** potassium alternative oral replacement **OR** potassium chloride, magnesium sulfate, oxyCODONE  Home Meds:   Prior to Admission medications    Medication Sig Start Date End Date Taking? Authorizing Provider   metoclopramide (REGLAN) 10 MG tablet Take 1 tablet by mouth 2 times daily (before meals) 9/26/22  Yes SCOTT Frost NP   sennosides-docusate sodium (SENOKOT-S) 8.6-50 MG tablet Take 2 tablets by mouth daily as needed for Constipation 9/26/22  Yes SCOTT Frost NP   polyethylene glycol (GLYCOLAX) 17 g packet Take 17 g by mouth daily as needed for Constipation 9/26/22 10/26/22 Yes SCOTT Frost NP   sucralfate (CARAFATE) 1 GM tablet Take 1 tablet by mouth 4 times daily 9/26/22  Yes SCOTT Frost NP   pantoprazole (PROTONIX) 40 MG tablet Take 1 tablet by mouth in the morning and at bedtime 9/16/22  Yes Gerry Osler, APRN - CNP   docusate sodium (COLACE, DULCOLAX) 100 MG CAPS Take 100 mg by mouth 2 times daily 8/21/22   SCOTT Umaña CNP   apixaban (ELIQUIS) 5 MG TABS tablet Take 1 tablet by mouth in the morning and 1 tablet before bedtime.  7/18/22 8/17/22  Daniela Haile MD   potassium chloride (KLOR-CON M) 20 MEQ extended release tablet Take 1 tablet by mouth 2 times daily 6/9/22   Esequiel Castleman, APRN - CNP   ergocalciferol (ERGOCALCIFEROL) 1.25 MG (69589 UT) capsule Take 50,000 Units by mouth every 7 days Takes on Thursday 10/19/21   Ar Automatic Reconciliation   lidocaine-prilocaine (EMLA) 2.5-2.5 % cream Apply to port about 45 minutes prior to access 3/4/22   Ar Automatic Reconciliation   ondansetron (ZOFRAN) 8 MG tablet Take 8 mg by mouth every 8 hours as needed 3/4/22   Ar Automatic Reconciliation   valACYclovir (VALTREX) 500 MG tablet Take 500 mg by mouth daily 9/1/21   Ar Automatic Reconciliation         Pre-Sedation Documentation and Exam:   I have personally completed a history, physical exam & review of systems for this patient (see notes).     Mallampati Airway Assessment:  normal, dentition not prohibitive, normal neck range of motion, Mallampati Class I - (soft palate, fauces, uvula & anterior/posterior tonsillar pillars are visible)    Prior History of Anesthesia Complications:   none    ASA Classification:  Class 2 - A normal healthy patient with mild systemic disease    Sedation/ Anesthesia Plan:   intravenous sedation    Medications Planned:   midazolam (Versed) intravenously and fentanyl intravenously    Patient is an appropriate candidate for plan of sedation: yes    Electronically signed by Avinash Gray PA-C on 9/26/2022 at 3:00 PM

## 2022-09-26 NOTE — PROGRESS NOTES
[facility] Hematology & Oncology        Inpatient Hematology / Oncology Progress Note      Admission Date: 2022  5:06 PM  Reason for Admission/Hospital Course: Hematemesis [K92.0]  Generalized abdominal pain [R10.84]  Intractable vomiting with nausea, unspecified vomiting type [R11.2]      24 Hour Events:  Afeb, VSS  Hgb 6.7 transfuse  IR for plx cath placement    ROS:  Constitutional: negative for fever, chills. +weakness, malaise fatigue. CV: negative for chest pain, palpitations, edema. Respiratory: negative for dyspnea, cough, wheezing. GI: +nausea/vomiting/ab pain improved    10 point review of systems is otherwise negative with the exception of the elements mentioned above in the HPI. No Known Allergies    OBJECTIVE:  Patient Vitals for the past 8 hrs:   BP Temp Temp src Pulse Resp SpO2   22 0734 125/82 98.2 °F (36.8 °C) Oral 74 16 96 %   22 0327 124/89 97.5 °F (36.4 °C) Oral 100 16 97 %     Temp (24hrs), Av °F (36.7 °C), Min:97.5 °F (36.4 °C), Max:98.2 °F (36.8 °C)    No intake/output data recorded. Physical Exam:  Constitutional: Well developed, thin appearing female in no acute distress, sitting comfortably in the hospital bed. HEENT: Normocephalic and atraumatic. Oropharynx is clear, mucous membranes are moist.  Extraocular muscles are intact. Sclerae anicteric. Skin Warm and dry. No bruising and no rash noted. No erythema. J   Respiratory Lungs are clear to auscultation bilaterally without wheezes, rales or rhonchi, normal air exchange without accessory muscle use. CVS Normal rate, regular rhythm    Abdomen Soft, mildly tender and distended, normoactive bowel sounds. Neuro Grossly nonfocal with no obvious sensory or motor deficits. MSK Normal range of motion in general.  No edema and no tenderness. Psych Appropriate mood and affect.         Labs:      Recent Labs     22  0335 22  0246 22  0357   WBC 3.0* 3.2* 3.7*   RBC 2.88* 2.76* 2.44*   HGB 7.7* 7.5* 6.7*   HCT 24.3* 24.2* 21.6*   MCV 84.4 87.7 88.5   MCH 26.7 27.2 27.5   MCHC 31.7 31.0* 31.0*   RDW 16.7* 16.7* 16.7*    376 365   MPV 11.1 10.3 10.4        Recent Labs     09/24/22  0335 09/25/22  0246 09/25/22  1131 09/26/22  0357    136  --  135*   K 3.3* 3.3* 3.9 3.6   * 108  --  109   CO2 24 23  --  22   BUN 4* 6  --  7   GFRAA >60 >60  --  >60   GLOB 3.3 3.2  --  3.1   MG 1.8 1.7*  --  1.8         Imaging:  Xray Result (most recent):  XR ABDOMEN (KUB) (SINGLE AP VIEW) 09/17/2022    Narrative  KUB    CLINICAL INDICATION:  Vomiting, foul-smelling emesis, bilateral hydronephrosis  and ureteral stents. History of ascites, peritoneal carcinomatosis, unknown  primary. COMPARISON: CT 9/12/2022, radiography 3/2/2022    TECHNIQUE: AP view of the abdomen supine portable 9:22 AM    FINDINGS:  There are bilateral ureteral stents, similar in position compared  with recent CT. Generalized haziness again noted in keeping with ascites. Nonobstructive bowel gas pattern. Within limits of supine radiography no  evidence of developing free air, pneumatosis, pneumobilia, or osseous changes. Surgical suture material again projects over the stomach. There is a decreased  small amount of retained oral contrast in the GI tract. Lung bases demonstrate  no consolidation. Impression  1. No bowel obstruction evident. 2.  Ascites, bilateral ureteral stents as seen on recent CT. CT Result (most recent):  CT ABDOMEN PELVIS W IV CONTRAST 09/12/2022    Narrative  EXAMINATION: CT  ABDOMEN / PELVIS   9/12/2022 8:06 PM    ACCESSION NUMBER:  [id number]    COMPARISON: 06/22/2022    INDICATION:  abdominal pain    TECHNIQUE: Contiguous axial computed tomographic images were obtained from the  domes of the diaphragm to the symphysis pubis following administration 100 mL  Iso-behzad 370. Coronal reconstructions were also performed. Oral contrast was also  administered.     Radiation dose reduction techniques were used for this study. Our CT scanners  use one or all of the following: Automated exposure control, adjustment of the  mA and/or kV according to patient size, iterative reconstruction. FINDINGS:    Lung bases: The visualized lung bases are clear. The visualized portions of the  heart are unremarkable. Liver: Normal    Gallbladder: There is gallbladder wall thickening. The gallbladder is not  distended. Spleen: Normal    Adrenals: Normal    Pancreas: Normal    Kidneys: The kidneys enhance symmetrically. There is moderate to severe  bilateral hydronephrosis with ureteral stents in place. Bladder/: The urinary bladder is somewhat thick-walled. There is previous  hysterectomy. .    Bowel: There is large volume ascites. There is some peritoneal enhancement. There is not peritoneal nodularity. There is some nodularity within the omentum  and mesentery. There is previous gastric sleeve type procedure. The appendix is  normal.    Vessels: Normal    Abdominal wall: Intact    Bones: No suspicious lytic or blastic bony lesions. Impression  Large amount of intraabdominal ascites with mesenteric and peritoneal nodularity  as well as peritoneal enhancement is most concerning for peritoneal  carcinomatosis. Bilateral hydronephrosis with ureteral stents in place. Thick-walled urinary bladder. Correlate with evidence of cystitis. Thick-walled gallbladder is felt likely to be sympathetic. The gallbladder is  not distended.       Medications:  Current Facility-Administered Medications   Medication Dose Route Frequency    0.9 % sodium chloride infusion   IntraVENous PRN    diphenhydrAMINE (BENADRYL) capsule 25 mg  25 mg Oral Q6H PRN    bisacodyl (DULCOLAX) suppository 10 mg  10 mg Rectal Daily PRN    sennosides-docusate sodium (SENOKOT-S) 8.6-50 MG tablet 2 tablet  2 tablet Oral Daily PRN    lactulose (CHRONULAC) 10 GM/15ML solution 20 g  20 g Oral PRN    morphine (MS CONTIN) extended to debulk. She presented to ED with 1 day duration of hematemesis. Pt reported she has been feeling abdominal distention that has progressively worsened over 1 week duration associated with mild abdominal discomfort. She stated that yesterday she vomited up what looked like bloody substance however she was unsure as she has drank red Gatorade earlier in the day. Per notes she had another episode of emesis in ED however at that time was nonbloody. CT AP shows large amount of intra-abdominal ascites with mesenteric/peritoneal nodularity concerning for peritoneal carcinomatosis; bilateral hydronephrosis with ureteral stents in place; thick-walled urinary bladder; thick-walled gallbladder felt to be sympathetic and not distended. IR is consulted for paracentesis with studies ordered. Eliquis is on hold and GI is consulted. PLAN:  CUP now with large volume ascites  -sp cycle 9 FOLFOX on 9/8/2022.  - CT AP shows large amount of intra-abdominal ascites with mesenteric/peritoneal nodularity concerning for peritoneal carcinomatosis; bilateral hydronephrosis with ureteral stents in place; thick-walled urinary bladder; thick-walled gallbladder felt to be sympathetic and not distended.  -Para with studies tomorrow-EGD today  9/14 EGD negative for bleed. Para -3800 studies ordered. Advance diet. 9/15 having nausea-antiemetics  9/16 complaining of acid reflux-added one time IV Pepcid-already on PO protonix bid  9/20 may need another para-follow up on AB US but may need repeat CT to evaluate  9/21 AB US: Cirrhotic liver. Intrahepatic, and borderline extra hepatic biliary ductal dilation. Moderate complex ascites which at times appears septated. Signs or symptoms of infection (i.e. peritonitis) should be excluded. Large bilateral renal stones. In addition, there is moderate right hydronephrosis, and moderate to severe left hydronephrosis of uncertain acuity.  Fluid within the dilated left renal pelvis appears complex Cr down to 0.7  RESOLVED    UTI  9/16 Noted cystitis on CT. UA on 9/13 c/w UTI - not treated. Repeat UA (c/w UTI). Ucx ordered. Start CTX  9/17 Ucx pending. Con't CTX (D2).  9/18 Ucx-NGTD. Completes CTX today. 9/22 UA neg for infection. UC NTD    Continue home meds  Andres sOPs  SCDs for DVT ppx    Goals and plan of care reviewed with the patient. All questions answered to the best of our ability. Disposition:  Anticipate discharge home once sx controlled. PT recommends HH at discharge. SCOTT Adair - NP  [facility] Hematology & Oncology  [address] [address]  Office : [phone]  Fax : [phone]    I personally saw, exammed and counselled the patient, and discussed with NP, agree with above history/assessment/plan. 52 y. o.female carcinoma of unknown primary followed by Dr. Caren Vera status post 9 cycles of palliative FOLFOX with initial response, on Eliquis for PE, admitted to ER for hematemesis, EGD negative, CT showed increased ascites, repeated paracentesis was done with malignant cytology, right Pleurx drain, discussed the need to follow in office for progression of disease and adjust chemotherapy when she shows proper performance status, continue supportive care. Leona Lara M.D.   [place] [address]  Office : [phone]  Fax : [phone]

## 2022-09-26 NOTE — CARE COORDINATION
LOS 14 D  CM following for discharge needs. Per notes, patient to have pleurX drain placed. Palliative following. DCP as per prior CM: Home with [facility]. CM remains available for any new discharge issues.

## 2022-09-26 NOTE — PROGRESS NOTES
Pt moved from IR to [address] [place] [address] room for procedure. Moved self on to table, secured for safety.

## 2022-09-26 NOTE — PROGRESS NOTES
Pt moved from IR to [address] [place] [address] room for procedure. Moved self on to table, secured for safety.

## 2022-09-26 NOTE — DISCHARGE SUMMARY
extrahepatic bile duct dilatation. No common bile duct stone is appreciated. Questionable tiny filling defect within the right main hepatic duct. Cholelithiasis. Nonspecific gallbladder wall thickening as described on prior ultrasound. Marked bilateral hydronephrosis with indwelling ureteral stents. Large volume of ascites. Cirrhotic liver morphology. Reglan was weaned to 10 mg BID and LFTs improved. Bili improved. She had pleurx removing 3800 ml + malignant cells and again last week removing 1000 ml. Her nausea and vomiting has improved. Barium esophagram concern for inflammation in the jejunum. GI added carafe which seems to be working well. She has pleurx drain placed yesterday removed 850 ml. Of note today once again, he LFTs and bili has increased. Reglan is now weaned to 5 mg daily. She is feeling will and is ready for discharge. [facility] services will be provided. We will arrange follow up with Dr. Akiko Spivey or NP later this week. She knows to call with any concerning symptoms.      Consults:  IP CONSULT TO GI  IP CONSULT TO INTERVENTIONAL RADIOLOGY  IP CONSULT TO ONCOLOGY  IP CONSULT TO INTERVENTIONAL RADIOLOGY  IP CONSULT TO PHYSICAL THERAPY  IP CONSULT TO OCCUPATIONAL THERAPY  IP CONSULT HOME HEALTH  IP CONSULT TO GI  IP CONSULT TO DIETITIAN  IP CONSULT TO PALLIATIVE CARE  IP CONSULT TO UROLOGY  IP CONSULT TO INTERVENTIONAL RADIOLOGY  IP CONSULT TO INTERVENTIONAL RADIOLOGY    Pertinent Diagnostic Studies:   Labs:    Recent Labs     09/25/22  0246 09/26/22  0357 09/26/22  1303 09/27/22  0237   WBC 3.2* 3.7*  --  4.9   HGB 7.5* 6.7* 8.5* 8.7*    365  --  380      Recent Labs     09/25/22  0246 09/25/22  1131 09/26/22  0357 09/27/22  0237     --  135* 140   K 3.3* 3.9 3.6 3.4*     --  109 109   CO2 23  --  22 23   BUN 6  --  7 7   MG 1.7*  --  1.8 1.4*        Medication List        START taking these medications      magnesium oxide 400 MG tablet  Commonly known as: MAG-OX  Take 1 tablet by mouth 2 17 g packet  sennosides-docusate sodium 8.6-50 MG tablet  sucralfate 1 GM tablet             OBJECTIVE:  Patient Vitals for the past 8 hrs:   BP Temp Temp src Pulse Resp SpO2   22 0743 (!) 126/95 97.9 °F (36.6 °C) Oral 89 16 97 %   22 0617 -- -- -- -- 18 --   22 0305 120/89 98.1 °F (36.7 °C) Oral 83 15 95 %     Temp (24hrs), Av.3 °F (36.8 °C), Min:97.5 °F (36.4 °C), Max:99.4 °F (37.4 °C)    No intake/output data recorded. Physical Exam:  Constitutional: Well developed, well nourished female in no acute distress, sitting comfortably in bedside chair   HEENT: Normocephalic and atraumatic. Oropharynx is clear, mucous membranes are moist.  Pupils are equal, round, and reactive to light. Extraocular muscles are intact. Sclerae anicteric. Skin Warm and dry. No bruising and no rash noted. No erythema. No pallor. Respiratory Lungs are clear to auscultation bilaterally without wheezes, rales or rhonchi, normal air exchange without accessory muscle use. CVS Normal rate, regular rhythm and normal S1 and S2. No murmurs, gallops, or rubs. Abdomen Soft, nontender and nondistended, normoactive bowel sounds. No palpable mass. No hepatosplenomegaly. Neuro Grossly nonfocal with no obvious sensory or motor deficits. MSK Normal range of motion in general.  No edema and no tenderness. Psych Appropriate mood and affect. ASSESSMENT:    Principal Problem:    Hematemesis  Active Problems:    Bilateral hydronephrosis    Peritoneal carcinomatosis (HCC)    Current use of long term anticoagulation    Intractable vomiting    Generalized abdominal pain    LFT elevation    Obstruction of both ureters    Carcinoma of unknown primary ([address]  Resolved Problems:    * No resolved hospital problems. *      DISPOSITION:  Needs follow up with Dr. Suleman Bush or NP later this week    Over 30 minutes was spent in discharge planning and coordination of care.             Ranell Goldberg, APRN - NP  [facility] Hematology & Oncology  [address] [facility]  [address]  Office : [phone]  Fax : [phone]    I personally saw, exammed and counselled the patient, and discussed with NP, agree with above history/assessment/plan. 52 y. o.female carcinoma of unknown primary followed by Dr. Dave Linda status post 9 cycles of palliative FOLFOX with initial response, on Eliquis for PE, admitted to ER for hematemesis, EGD negative, CT showed increased ascites, repeated paracentesis was done with malignant cytology, right Pleurx drain, discussed the need to follow in office for progression of disease and adjust chemotherapy when she shows proper performance status, patient understand and will be seen in office on Friday. Mayi Bosch M.D.   [address]  Office : [phone]  Fax : [phone]

## 2022-09-26 NOTE — PROGRESS NOTES
Occupational Therapy Note: Chart reviewed. Pt on hold due to low Hgb. Will continue to treat as pt is able and time/schedule permit.     [facility], [address]

## 2022-09-26 NOTE — BRIEF OP NOTE
Department of Interventional Radiology  [phone]        Interventional Radiology Brief Procedure Note    Patient: Tanisha Serrano MRN: [id number]  SSN: [id number]    YOB: 1975  Age: 52 y.o. Sex: female      Date of Procedure: 9/26/2022    Pre-Procedure Diagnosis: peritoneal carcinomatosis, ascites    Post-Procedure Diagnosis: SAME    Procedure(s): Abdominal Pleurx drain placement    Brief Description of Procedure: as above    Performed By: Kaila Tavarez PA-C     Assistants: None    Anesthesia:Moderate Sedation per MARIANNA Vu MD    Estimated Blood Loss: Less than 10ml    Specimens:  None    Implants:  tunneled drain    Findings: large volume ascites     Complications: None    Recommendations: routine wound care     Follow Up: N    Signed By: Kaila Tavarez PA-C     September 26, 2022

## 2022-09-26 NOTE — PROGRESS NOTES
Palliative Care Progress Note    Patient: Zain Chester MRN: [id number]  SSN: [id number]    YOB: 1975  Age: 52 y.o. Sex: female       Assessment/Plan:     Chief Complaint/Interval History: pain well controlled. Pleurx planned for today     Principal Diagnosis:    Pain, abdomen  R10.9    Additional Diagnoses:   Ascites  R18.8  Fatigue, Lethargy  R53.83  Nausea/Vomiting  R11.2  Counseling, Encounter for Medical Advice  Z71.9  Encounter for Palliative Care  Z51.5    Palliative Performance Scale (PPS)       Medical Decision Making:   Pt resting in bed, appears comfortable. No visitors at bedside. Pt reports she is doing well today. She is scheduled for a Pleurx drain placement today for recurrent ascites. Pt reports he pain is well controlled with MSContin 15 mg q 12 hours and Oxycodone 5 mg PRN. Pt reports intermittent nausea, but denies any at present. Will continue to follow. Will discuss findings with members of the interdisciplinary team.      More than 50% of this 15 minute visit was spent counseling and coordination of care as outlined above. Subjective:     Comprehensive Review of Systems was negative except for:  Constitutional: Positive for fatigue  GI: Positive for distention, pain, intermittent nausea      Objective:     Visit Vitals  /83   Pulse 70   Temp 98.9 °F (37.2 °C) (Oral)   Resp 16   Ht 5' 5\" (1.651 m)   Wt 120 lb 3.2 oz (54.5 kg)   SpO2 98%   BMI 20.00 kg/m²       Physical Exam:    General:  Cooperative. No acute distress. Eyes:  Conjunctivae/corneas clear    Nose: Nares normal. Septum midline.    Neck: Supple, symmetrical, trachea midline   Lungs:   Clear to auscultation bilaterally, unlabored   Heart:  Regular rate and rhythm   Abdomen:   Soft, non-tender, distended   Extremities: Normal, atraumatic, no cyanosis or edema   Skin: Skin color, texture, turgor normal.    Neurologic: Nonfocal   Psych: Alert and oriented     Signed By: SCOTT Padgett - CNP     September 26, 2022

## 2022-09-27 ENCOUNTER — HOME HEALTH ADMISSION (OUTPATIENT)
Dept: HOME HEALTH SERVICES | Facility: HOME HEALTH | Age: 47
End: 2022-09-27

## 2022-09-27 VITALS
OXYGEN SATURATION: 99 % | DIASTOLIC BLOOD PRESSURE: 82 MMHG | SYSTOLIC BLOOD PRESSURE: 123 MMHG | HEIGHT: 65 IN | RESPIRATION RATE: 15 BRPM | BODY MASS INDEX: 19.34 KG/M2 | WEIGHT: 116.1 LBS | HEART RATE: 86 BPM | TEMPERATURE: 98.6 F

## 2022-09-27 LAB
ABO + RH BLD: NORMAL
ALBUMIN SERPL-MCNC: 1.7 G/DL (ref 3.5–5)
ALBUMIN/GLOB SERPL: 0.5 {RATIO} (ref 1.2–3.5)
ALP SERPL-CCNC: 650 U/L (ref 50–136)
ALT SERPL-CCNC: 73 U/L (ref 12–65)
ANION GAP SERPL CALC-SCNC: 8 MMOL/L (ref 4–13)
AST SERPL-CCNC: 119 U/L (ref 15–37)
BASOPHILS # BLD: 0 K/UL (ref 0–0.2)
BASOPHILS NFR BLD: 0 % (ref 0–2)
BILIRUB SERPL-MCNC: 1.2 MG/DL (ref 0.2–1.1)
BLD PROD TYP BPU: NORMAL
BLOOD BANK CMNT PATIENT-IMP: NORMAL
BLOOD BANK DISPENSE STATUS: NORMAL
BLOOD GROUP ANTIBODIES SERPL: NORMAL
BPU ID: NORMAL
BUN SERPL-MCNC: 7 MG/DL (ref 6–23)
CALCIUM SERPL-MCNC: 7.9 MG/DL (ref 8.3–10.4)
CHLORIDE SERPL-SCNC: 109 MMOL/L (ref 101–110)
CO2 SERPL-SCNC: 23 MMOL/L (ref 21–32)
CREAT SERPL-MCNC: 0.5 MG/DL (ref 0.6–1)
CROSSMATCH RESULT: NORMAL
DIFFERENTIAL METHOD BLD: ABNORMAL
EOSINOPHIL # BLD: 0 K/UL (ref 0–0.8)
EOSINOPHIL NFR BLD: 0 % (ref 0.5–7.8)
ERYTHROCYTE [DISTWIDTH] IN BLOOD BY AUTOMATED COUNT: 16.1 % (ref 11.9–14.6)
GLOBULIN SER CALC-MCNC: 3.2 G/DL (ref 2.3–3.5)
GLUCOSE SERPL-MCNC: 83 MG/DL (ref 65–100)
HCT VFR BLD AUTO: 27 % (ref 35.8–46.3)
HGB BLD-MCNC: 8.7 G/DL (ref 11.7–15.4)
IMM GRANULOCYTES # BLD AUTO: 0.2 K/UL (ref 0–0.5)
IMM GRANULOCYTES NFR BLD AUTO: 4 % (ref 0–5)
LYMPHOCYTES # BLD: 0.8 K/UL (ref 0.5–4.6)
LYMPHOCYTES NFR BLD: 16 % (ref 13–44)
MAGNESIUM SERPL-MCNC: 1.4 MG/DL (ref 1.8–2.4)
MCH RBC QN AUTO: 28.3 PG (ref 26.1–32.9)
MCHC RBC AUTO-ENTMCNC: 32.2 G/DL (ref 31.4–35)
MCV RBC AUTO: 87.9 FL (ref 79.6–97.8)
MONOCYTES # BLD: 1 K/UL (ref 0.1–1.3)
MONOCYTES NFR BLD: 21 % (ref 4–12)
NEUTS SEG # BLD: 2.8 K/UL (ref 1.7–8.2)
NEUTS SEG NFR BLD: 59 % (ref 43–78)
NRBC # BLD: 0 K/UL (ref 0–0.2)
PLATELET # BLD AUTO: 380 K/UL (ref 150–450)
PMV BLD AUTO: 10.3 FL (ref 9.4–12.3)
POTASSIUM SERPL-SCNC: 3.4 MMOL/L (ref 3.5–5.1)
PROT SERPL-MCNC: 4.9 G/DL (ref 6.3–8.2)
RBC # BLD AUTO: 3.07 M/UL (ref 4.05–5.2)
SODIUM SERPL-SCNC: 140 MMOL/L (ref 136–145)
SPECIMEN EXP DATE BLD: NORMAL
UNIT DIVISION: 0
WBC # BLD AUTO: 4.9 K/UL (ref 4.3–11.1)

## 2022-09-27 PROCEDURE — 2580000003 HC RX 258: Performed by: FAMILY MEDICINE

## 2022-09-27 PROCEDURE — 6370000000 HC RX 637 (ALT 250 FOR IP): Performed by: INTERNAL MEDICINE

## 2022-09-27 PROCEDURE — 36591 DRAW BLOOD OFF VENOUS DEVICE: CPT

## 2022-09-27 PROCEDURE — 99239 HOSP IP/OBS DSCHRG MGMT >30: CPT | Performed by: INTERNAL MEDICINE

## 2022-09-27 PROCEDURE — 85025 COMPLETE CBC W/AUTO DIFF WBC: CPT

## 2022-09-27 PROCEDURE — A4216 STERILE WATER/SALINE, 10 ML: HCPCS | Performed by: NURSE PRACTITIONER

## 2022-09-27 PROCEDURE — 6360000002 HC RX W HCPCS: Performed by: NURSE PRACTITIONER

## 2022-09-27 PROCEDURE — C9113 INJ PANTOPRAZOLE SODIUM, VIA: HCPCS | Performed by: NURSE PRACTITIONER

## 2022-09-27 PROCEDURE — 6370000000 HC RX 637 (ALT 250 FOR IP): Performed by: NURSE PRACTITIONER

## 2022-09-27 PROCEDURE — 2580000003 HC RX 258: Performed by: NURSE PRACTITIONER

## 2022-09-27 PROCEDURE — 80053 COMPREHEN METABOLIC PANEL: CPT

## 2022-09-27 PROCEDURE — 83735 ASSAY OF MAGNESIUM: CPT

## 2022-09-27 PROCEDURE — 6370000000 HC RX 637 (ALT 250 FOR IP): Performed by: FAMILY MEDICINE

## 2022-09-27 PROCEDURE — 36592 COLLECT BLOOD FROM PICC: CPT

## 2022-09-27 RX ORDER — METOCLOPRAMIDE 5 MG/1
5 TABLET ORAL DAILY
Qty: 120 TABLET | Refills: 3 | Status: ON HOLD | OUTPATIENT
Start: 2022-09-28 | End: 2022-10-26 | Stop reason: HOSPADM

## 2022-09-27 RX ORDER — MAGNESIUM OXIDE 400 MG/1
400 TABLET ORAL 2 TIMES DAILY
Qty: 60 TABLET | Refills: 1 | Status: ON HOLD | OUTPATIENT
Start: 2022-09-27 | End: 2022-10-07 | Stop reason: HOSPADM

## 2022-09-27 RX ORDER — METOCLOPRAMIDE 10 MG/1
5 TABLET ORAL DAILY
Status: DISCONTINUED | OUTPATIENT
Start: 2022-09-28 | End: 2022-09-27 | Stop reason: HOSPADM

## 2022-09-27 RX ADMIN — ONDANSETRON 4 MG: 2 INJECTION INTRAMUSCULAR; INTRAVENOUS at 02:53

## 2022-09-27 RX ADMIN — POTASSIUM CHLORIDE 40 MEQ: 1500 TABLET, EXTENDED RELEASE ORAL at 06:09

## 2022-09-27 RX ADMIN — ONDANSETRON 4 MG: 2 INJECTION INTRAMUSCULAR; INTRAVENOUS at 11:38

## 2022-09-27 RX ADMIN — SODIUM CHLORIDE, PRESERVATIVE FREE 40 MG: 5 INJECTION INTRAVENOUS at 10:22

## 2022-09-27 RX ADMIN — MORPHINE SULFATE 15 MG: 15 TABLET, FILM COATED, EXTENDED RELEASE ORAL at 10:21

## 2022-09-27 RX ADMIN — OXYCODONE 5 MG: 5 TABLET ORAL at 06:17

## 2022-09-27 RX ADMIN — METOCLOPRAMIDE 10 MG: 10 TABLET ORAL at 06:09

## 2022-09-27 RX ADMIN — SODIUM CHLORIDE, PRESERVATIVE FREE 10 ML: 5 INJECTION INTRAVENOUS at 10:22

## 2022-09-27 RX ADMIN — SUCRALFATE 1 G: 1 TABLET ORAL at 06:09

## 2022-09-27 RX ADMIN — SUCRALFATE 1 G: 1 TABLET ORAL at 11:38

## 2022-09-27 ASSESSMENT — PAIN - FUNCTIONAL ASSESSMENT: PAIN_FUNCTIONAL_ASSESSMENT: ACTIVITIES ARE NOT PREVENTED

## 2022-09-27 ASSESSMENT — PAIN SCALES - GENERAL
PAINLEVEL_OUTOF10: 0
PAINLEVEL_OUTOF10: 6
PAINLEVEL_OUTOF10: 0

## 2022-09-27 ASSESSMENT — PAIN DESCRIPTION - LOCATION: LOCATION: ABDOMEN

## 2022-09-27 ASSESSMENT — PAIN DESCRIPTION - DESCRIPTORS: DESCRIPTORS: ACHING

## 2022-09-27 ASSESSMENT — PAIN DESCRIPTION - ORIENTATION: ORIENTATION: RIGHT

## 2022-09-27 NOTE — PROGRESS NOTES
and Nutrient Intake, Supplement Intake  Physical Signs/Symptoms Outcomes: GI Status, Nausea or Vomiting, Meal Time Behavior, Weight    Discharge Planning:    Continue Oral Nutrition Supplement    GIGI GOODRICH, RD

## 2022-09-27 NOTE — PLAN OF CARE
Problem: Discharge Planning  Goal: Discharge to home or other facility with appropriate resources  9/27/2022 1123 by Ramsey Rod RN  Outcome: Completed  9/27/2022 0741 by Ramsey Rod RN  Outcome: Progressing  9/27/2022 0634 by Janusz Wilson RN  Outcome: Progressing  Flowsheets (Taken 9/26/2022 2008)  Discharge to home or other facility with appropriate resources:   Identify barriers to discharge with patient and caregiver   Arrange for needed discharge resources and transportation as appropriate   Identify discharge learning needs (meds, wound care, etc)     Problem: Safety - Adult  Goal: Free from fall injury  9/27/2022 1123 by Ramsey Rod RN  Outcome: Completed  9/27/2022 0741 by Ramsey Rod RN  Outcome: Progressing  9/27/2022 0634 by Janusz Wilsno RN  Outcome: Progressing     Problem: Skin/Tissue Integrity  Goal: Absence of new skin breakdown  Description: 1. Monitor for areas of redness and/or skin breakdown  2. Assess vascular access sites hourly  3. Every 4-6 hours minimum:  Change oxygen saturation probe site  4. Every 4-6 hours:  If on nasal continuous positive airway pressure, respiratory therapy assess nares and determine need for appliance change or resting period.   9/27/2022 1123 by Ramsey Rod RN  Outcome: Completed  9/27/2022 0741 by Ramsey Rod RN  Outcome: Progressing  9/27/2022 0634 by Janusz Wilson RN  Outcome: Progressing     Problem: ABCDS Injury Assessment  Goal: Absence of physical injury  9/27/2022 1123 by Ramsey Rod RN  Outcome: Completed  9/27/2022 0741 by Ramsey Rod RN  Outcome: Progressing  9/27/2022 0634 by Janusz Wilson RN  Outcome: Progressing  Flowsheets  Taken 9/27/2022 0256  Absence of Physical Injury: Implement safety measures based on patient assessment  Taken 9/26/2022 2008  Absence of Physical Injury: Implement safety measures based on patient assessment

## 2022-09-27 NOTE — PLAN OF CARE
Problem: Discharge Planning  Goal: Discharge to home or other facility with appropriate resources  9/27/2022 0741 by Virgie Pena RN  Outcome: Progressing  9/27/2022 0634 by Rose Velez RN  Outcome: Progressing  Flowsheets (Taken 9/26/2022 2008)  Discharge to home or other facility with appropriate resources:   Identify barriers to discharge with patient and caregiver   Arrange for needed discharge resources and transportation as appropriate   Identify discharge learning needs (meds, wound care, etc)     Problem: Safety - Adult  Goal: Free from fall injury  9/27/2022 0741 by Virgie Pena RN  Outcome: Progressing  9/27/2022 0634 by Rose Velez RN  Outcome: Progressing     Problem: Skin/Tissue Integrity  Goal: Absence of new skin breakdown  Description: 1. Monitor for areas of redness and/or skin breakdown  2. Assess vascular access sites hourly  3. Every 4-6 hours minimum:  Change oxygen saturation probe site  4. Every 4-6 hours:  If on nasal continuous positive airway pressure, respiratory therapy assess nares and determine need for appliance change or resting period.   9/27/2022 0741 by Virgie Pena RN  Outcome: Progressing  9/27/2022 0634 by Rose Velez RN  Outcome: Progressing     Problem: ABCDS Injury Assessment  Goal: Absence of physical injury  9/27/2022 0741 by Virgie Pena RN  Outcome: Progressing  9/27/2022 0634 by Rose Velez RN  Outcome: Progressing  Flowsheets  Taken 9/27/2022 0256  Absence of Physical Injury: Implement safety measures based on patient assessment  Taken 9/26/2022 2008  Absence of Physical Injury: Implement safety measures based on patient assessment

## 2022-09-27 NOTE — PLAN OF CARE
Problem: Discharge Planning  Goal: Discharge to home or other facility with appropriate resources  Outcome: Progressing  Flowsheets (Taken 9/26/2022 2008)  Discharge to home or other facility with appropriate resources:   Identify barriers to discharge with patient and caregiver   Arrange for needed discharge resources and transportation as appropriate   Identify discharge learning needs (meds, wound care, etc)     Problem: Safety - Adult  Goal: Free from fall injury  Outcome: Progressing     Problem: Skin/Tissue Integrity  Goal: Absence of new skin breakdown  Description: 1. Monitor for areas of redness and/or skin breakdown  2. Assess vascular access sites hourly  3. Every 4-6 hours minimum:  Change oxygen saturation probe site  4. Every 4-6 hours:  If on nasal continuous positive airway pressure, respiratory therapy assess nares and determine need for appliance change or resting period.   Outcome: Progressing     Problem: ABCDS Injury Assessment  Goal: Absence of physical injury  Outcome: Progressing  Flowsheets  Taken 9/27/2022 0256  Absence of Physical Injury: Implement safety measures based on patient assessment  Taken 9/26/2022 2008  Absence of Physical Injury: Implement safety measures based on patient assessment

## 2022-09-27 NOTE — DISCHARGE SUMMARY
Discharge Note:    Discharge instructions given, port de-accessed, cleansed, covered with gauze and tegaderm, reviewed new and existing medications with pt, reviewed follow up appts with pt, gave opportunity for questions or concerns, pt had none and stated she understood discharge instructions. Pt left floor via wheelchair escorted by [facility] staff.

## 2022-09-28 ENCOUNTER — HOME CARE VISIT (OUTPATIENT)
Dept: SCHEDULING | Facility: HOME HEALTH | Age: 47
End: 2022-09-28

## 2022-09-28 ENCOUNTER — TELEPHONE (OUTPATIENT)
Dept: INTERNAL MEDICINE CLINIC | Facility: CLINIC | Age: 47
End: 2022-09-28

## 2022-09-28 NOTE — TELEPHONE ENCOUNTER
Called patient to schedule TCV appt following discharge from [facility] 09/27/2022. Dx Hematemesis [K92.0]  Generalized abdominal pain [R10.84]  Intractable vomiting with nausea, unspecified vomiting . Patient has follow up with appt with Dr Henry Alicea. Appt 09/29/2022. Patient states she does not want to establish with PCP at this time.

## 2022-09-29 ENCOUNTER — OFFICE VISIT (OUTPATIENT)
Dept: ONCOLOGY | Age: 47
End: 2022-09-29

## 2022-09-29 ENCOUNTER — OFFICE VISIT (OUTPATIENT)
Dept: ONCOLOGY | Age: 47
End: 2022-09-29
Payer: COMMERCIAL

## 2022-09-29 ENCOUNTER — CLINICAL DOCUMENTATION (OUTPATIENT)
Dept: CASE MANAGEMENT | Age: 47
End: 2022-09-29

## 2022-09-29 ENCOUNTER — TELEPHONE (OUTPATIENT)
Dept: ONCOLOGY | Age: 47
End: 2022-09-29

## 2022-09-29 VITALS
TEMPERATURE: 99.8 F | HEART RATE: 82 BPM | DIASTOLIC BLOOD PRESSURE: 94 MMHG | RESPIRATION RATE: 20 BRPM | BODY MASS INDEX: 20.83 KG/M2 | OXYGEN SATURATION: 99 % | HEIGHT: 64 IN | SYSTOLIC BLOOD PRESSURE: 147 MMHG | WEIGHT: 122 LBS

## 2022-09-29 DIAGNOSIS — C78.6 PERITONEAL CARCINOMATOSIS (HCC): Primary | ICD-10-CM

## 2022-09-29 DIAGNOSIS — N13.30 HYDRONEPHROSIS, UNSPECIFIED HYDRONEPHROSIS TYPE: ICD-10-CM

## 2022-09-29 DIAGNOSIS — C80.1 CARCINOMA OF UNKNOWN PRIMARY (HCC): ICD-10-CM

## 2022-09-29 DIAGNOSIS — Z00.8 NUTRITIONAL ASSESSMENT: Primary | ICD-10-CM

## 2022-09-29 PROCEDURE — 99215 OFFICE O/P EST HI 40 MIN: CPT | Performed by: INTERNAL MEDICINE

## 2022-09-29 RX ORDER — OXYCODONE HYDROCHLORIDE 5 MG/1
5 TABLET ORAL EVERY 6 HOURS PRN
Qty: 90 TABLET | Refills: 0 | Status: SHIPPED | OUTPATIENT
Start: 2022-09-29 | End: 2022-10-13

## 2022-09-29 NOTE — TELEPHONE ENCOUNTER
Elizabeth Pratt from [facility] home health calling on the behalf of the Pt. Gail Pratt would like to know how many times do she need to drain the Pleurx? Due to her ordering enough supplies for the PT.     Elizabeth Pratt   [phone]

## 2022-09-29 NOTE — PROGRESS NOTES
9/29/22 saw pt today with Dr. Bandar Stanley for hospital follow up. Discussed hospital stay. She wants to continue with treatment, plan to change to taxol/cyramza. Potential side effects reviewed. She is having some pain, oxycodone sent to pharmacy. She has been admitted to [facility], they will come out today with more bottles for her to drain abdomen at home. Provided opportunity to ask questions and all were discussed. Chemo approval team emailed, start date of next week. Navigation will continue to follow.

## 2022-09-29 NOTE — PATIENT INSTRUCTIONS
Patient Instructions from Today's Visit    Reason for Visit:  Hospital follow up     Plan:  Can change chemo regimen - taxol and cyramza   Will send oxycodone to pharmacy for pain     Follow Up:  Next week to start new regimen     Recent Lab Results:  No labs today     Treatment Summary has been discussed and given to patient: no        -------------------------------------------------------------------------------------------------------------------  Please call our office at [phone] if you have any  of the following symptoms:   Fever of 100.5 or greater  Chills  Shortness of breath  Swelling or pain in one leg    After office hours an answering service is available and will contact a provider for emergencies or if you are experiencing any of the above symptoms. Patient did express an interest in My Chart. My Chart log in information explained on the after visit summary printout at the [facility] desk. Susana Rodriguez RN, BSN  Nurse Navigator  [phone] cell  [email or web address]      Chemotherapy/Agent Information:     Diagnosis: carcinomatosis    Goal of Therapy: _x_ Palliative      _ _Curative         Name of Chemotherapy medications: taxol and cyramza     Number of Cycles Planned: 6                  Length of Cycle:  21 days      Chemotherapy plan is subject to change at your provider's discretion    A copy of this plan has been discussed and given to patient by Susana Rodriguez RN.     Chemo Education:   Scheduled  not first line  Previously reviewed not first line    Consent for therapy:   Completed on will sign at start of chemo

## 2022-09-30 NOTE — PROGRESS NOTES
HEMATOLOGY/ONCOLOGY FOLLOWUP  VISIT      Patient Name: German Norton             Date of Visit: 2022  : 1975  Age:47 y.o. Presenting Complaint:  German Norton  is seen in follow-up for carcinoma of unknown primary. History of Present Illness:  Ms. Walker was seen for the first time in our office in 2022. She was a woman of previously good health who began noticing left-sided back discomfort in early 2022. This was associated  ultimately with a sense of difficulty swallowing and ultimately she presented to [facility] for evaluation. Her symptoms were felt to potentially be indicative of a bowel obstruction and she was sent for a CT scan. This study, performed on 2022  was notable for a linear calcific density along the course of the proximal left ureter with associated moderate hydronephrosis potentially indicative of an intraureteral calculus. There was also stranding throughout the retroperitoneal soft tissues anterior  to the left psoas muscle with pelvic ascites. There was also wall thickening involving the descending colon. The question of colitis or serosal implants was raised. The patient had undergone a gastric sleeve placement several years prior. She had  also undergone a negative colonoscopy about 3 years prior to these presentations. There was a history of anemia ultimately resulting in the performance of a hysterectomy approximately 3 years ago for menorrhagia. Because of the CT scan findings, she  was ultimately referred to Dr. Westley Merlin for evaluation of a possible pelvic malignancy. On 2022 he performed a laparoscopy and biopsy with evidence of peritoneal carcinomatosis grossly. A \"peritoneal nodule\" and a \"peritoneal implant\"  were both positive for a poorly differentiated metastatic carcinoma potentially consistent with an upper gastrointestinal primary.   An omental biopsy showed no evidence of malignancy. Immunohistochemistries were positive for cytokeratin 7 and negative  for cytokeratin 20. The tumor was weakly positive for CDX2. The only other positive study was MOC-31. Testing through [facility] demonstrated no mutation and ERB-B2. There was no microsatellite instability and no mismatch repair protein deficiencies. There were no targetable lesions. A PET scan was subsequently performed on  showing elevated FDG uptake in the left pelvis corresponding with a masslike density concerning  for peritoneal carcinomatosis. There was a small volume of free fluid within the peritoneal cavity. There was moderate right hydroureteronephrosis. Despite the pathologic findings, there was no evidence of FDG activity in the upper gastrointestinal  tract. CA-125 was slightly elevated at 44. Given the uncertainty of her diagnosis, the patient went back for additional surgery on February 15 at which time she underwent bilateral ureteral stent placement and bilateral salpingo-oophorectomies. Pathology  from that surgery was notable for poorly differentiated carcinoma with occasional signet ring features. Immunohistochemical stains were most consistent with a tumor of the upper gastrointestinal tract. She is  1 para 1 abortus 0. There is no  family history of cancer. She has had no difficulties with vomiting. She still notes that some food traverses her esophagus slowly. She had undergone a prior dilatation without any evidence of cancer. She subsequently began treatment with FOLFOX ultimately  with the addition of bevacizumab in 2022. She ultimately received 8 cycles of FOLFOX most of them with Avastin. On , she was taken to surgery for evaluation of debulking and HIPEC. Unfortunately, at the time of surgery her tumor was found to be tightly adherent and impossible to debulk. She returns for follow-up. Much has happened since I last saw her.   She was hospitalized with progressive ascites and recurrent vomiting. A Pleurx catheter was placed with large volume paracentesis with improvement of symptoms. She also suffered from hyperbilirubinemia which may have been secondary to her metoclopramide. Her oral intake was variable but somewhat limited. She was discharged her recently with prompt follow-up here. At the present time, she still has intermittent vomiting and feels as though her belly needs to be tapped. She does not have adequate supplies at home to do this. She is asking to go to the emergency department after her visit with us to allow for this drainage. She has had no fever but occasionally feels warm. Her p.o. intake has been quite limited particularly when her abdomen feels full. She is not at this point capable of draining her abdomen on her own. Pain control seems reasonable on her current regimen. Medications:   Current Outpatient Medications   Medication Sig Dispense Refill    oxyCODONE (ROXICODONE) 5 MG immediate release tablet Take 1 tablet by mouth every 6 hours as needed for Pain for up to 30 days. Intended supply: 3 days. Take lowest dose possible to manage pain 90 tablet 0    metoclopramide (REGLAN) 5 MG tablet Take 1 tablet by mouth daily 120 tablet 3    sennosides-docusate sodium (SENOKOT-S) 8.6-50 MG tablet Take 2 tablets by mouth daily as needed for Constipation 90 tablet 0    polyethylene glycol (GLYCOLAX) 17 g packet Take 17 g by mouth daily as needed for Constipation 527 g 1    sucralfate (CARAFATE) 1 GM tablet Take 1 tablet by mouth 4 times daily 120 tablet 3    pantoprazole (PROTONIX) 40 MG tablet Take 1 tablet by mouth in the morning and at bedtime 60 tablet 0    docusate sodium (COLACE, DULCOLAX) 100 MG CAPS Take 100 mg by mouth 2 times daily      apixaban (ELIQUIS) 5 MG TABS tablet Take 1 tablet by mouth in the morning and 1 tablet before bedtime.  60 tablet 0    potassium chloride (KLOR-CON M) 20 MEQ extended release tablet Take 1 tablet by mouth 2 times daily 60 tablet 1    lidocaine-prilocaine (EMLA) 2.5-2.5 % cream Apply to port about 45 minutes prior to access      ondansetron (ZOFRAN) 8 MG tablet Take 8 mg by mouth every 8 hours as needed      valACYclovir (VALTREX) 500 MG tablet Take 500 mg by mouth daily      magnesium oxide (MAG-OX) 400 MG tablet Take 1 tablet by mouth 2 times daily (Patient not taking: Reported on 9/29/2022) 60 tablet 1    ergocalciferol (ERGOCALCIFEROL) 1.25 MG (48589 UT) capsule Take 50,000 Units by mouth every 7 days Takes on Thursday (Patient not taking: Reported on 9/29/2022)       No current facility-administered medications for this visit. Allergies:  No Known Allergies    Review of Systems: The Review of Systems is documented in full in the internal medical record. All systems are negative other than for those noted above. Past Medical History:  Documented in electronic medical record    Past Surgical History:  Documented in electronic medical record    Social History:  Documented in electronic medical record    Family History:  Documented in electronic medical record      Physical Examination:  General Appearance: Chronically ill appearing patient in no acute distress. Vital signs: BP (!) 147/94 Comment: sitting  Pulse 82   Temp 99.8 °F (37.7 °C) (Oral)   Resp 20   Ht 5' 4\" (1.626 m)   Wt 122 lb (55.3 kg)   SpO2 99%   BMI 20.94 kg/m²     Performance status: ECOG Level:3  Distress  Screening Score: No data recorded  Pain Score:   6 (fatigue-10)    HEENT: No oral exam.  Neck: Supple. There is no thyromegaly. Lymph nodes: There is no cervical, supraclavicular, axillary or inguinal adenopathy. Breasts: Not examined. Lungs: The lungs are clear to auscultation and percussion. There is no egophony. There is no chest wall tenderness and no  use of accessory respiratory musculature. Heart: There is no jugular venous distention. The rate is normal and rhythm regular.  The S1 and S2 are normal and there are no murmurs or rubs. Abdomen: Soft, non-tender, distended, bowel sounds present and normal, no appreciated hepatosplenomegaly. No palpable masses. Abdominal Pleurx catheter in place. Skin: No rash, petechiae or ecchymoses. No evidence of malignancy. Extremities: No cyanosis, clubbing; trace lower extremity edema. Labs/Imaging:  Lab Results   Component Value Date/Time    WBC 4.9 09/27/2022 02:37 AM    HGB 8.7 09/27/2022 02:37 AM    HCT 27.0 09/27/2022 02:37 AM     09/27/2022 02:37 AM    MCV 87.9 09/27/2022 02:37 AM       Lab Results   Component Value Date/Time     09/27/2022 02:37 AM    K 3.4 09/27/2022 02:37 AM     09/27/2022 02:37 AM    CO2 23 09/27/2022 02:37 AM    BUN 7 09/27/2022 02:37 AM    GFRAA >60 09/27/2022 02:37 AM    GLOB 3.2 09/27/2022 02:37 AM    ALT 73 09/27/2022 02:37 AM       Above results reviewed with patient. ASSESSMENT:   Ms. Shirin Dinh has an apparent metastatic carcinoma of unknown primary. Pathologically, the tumor seems to resemble a process arising in the upper gastrointestinal tract. However, any such primary is  not visible on EGD or PET scanning. Dr. Jennifer Powell presumption is that this may have originated from the colon based on its location and behavior. That too would be somewhat inconsistent with the pathology findings. She has been treated with FOLFOX and Avastin. There is certainly no symptomatic response at the present time and progressive ascites would suggest worsening of disease although this is not easily measurable on scans. Given the above, she would be a candidate for alternative therapy although is not at all clear that she has a performance status that will allow that. PLAN:  We have contacted her visiting nursing service and they will send someone to her home to tap the Pleurx and provide her some relief.   We have encouraged soft foods and liquids including boost and protein shakes to optimize her oral intake. She does not appear to have an anatomical bowel obstruction although she may have a functional bowel obstruction given the carcinomatosis. We will tentatively plan to try and treat her with paclitaxel and Cyramza for a possible upper GI source although once again it is unclear whether her performance status will allow us that opportunity. Given her age, I would like to at least make an effort if it is feasible. I have tried to be fairly straightforward about my concerns regarding her current status, my ability to give her chemotherapy, and the uncertainty as to whether or not such therapy will provide benefit.         RESUSCITATION DIRECTIVES/HOSPICE CARE;  Full Support           [facility]    Oncology and Hematology   [address]  P [phone]  F [phone]  [email or web address]

## 2022-10-01 ENCOUNTER — HOSPITAL ENCOUNTER (OUTPATIENT)
Dept: INFUSION THERAPY | Age: 47
End: 2022-10-01

## 2022-10-03 ENCOUNTER — APPOINTMENT (OUTPATIENT)
Dept: GENERAL RADIOLOGY | Age: 47
DRG: 988 | End: 2022-10-03
Payer: COMMERCIAL

## 2022-10-03 ENCOUNTER — APPOINTMENT (OUTPATIENT)
Dept: CT IMAGING | Age: 47
DRG: 988 | End: 2022-10-03
Payer: COMMERCIAL

## 2022-10-03 ENCOUNTER — HOSPITAL ENCOUNTER (INPATIENT)
Age: 47
LOS: 5 days | Discharge: HOME HEALTH | DRG: 988 | End: 2022-10-08
Attending: EMERGENCY MEDICINE | Admitting: INTERNAL MEDICINE
Payer: COMMERCIAL

## 2022-10-03 DIAGNOSIS — R11.2 INTRACTABLE VOMITING WITH NAUSEA: Primary | ICD-10-CM

## 2022-10-03 DIAGNOSIS — E87.6 HYPOKALEMIA: ICD-10-CM

## 2022-10-03 PROBLEM — R10.9 ABDOMINAL PAIN: Status: ACTIVE | Noted: 2022-10-03

## 2022-10-03 LAB
ALBUMIN SERPL-MCNC: 1.7 G/DL (ref 3.5–5)
ALBUMIN/GLOB SERPL: 0.5 {RATIO} (ref 1.2–3.5)
ALP SERPL-CCNC: 205 U/L (ref 50–136)
ALT SERPL-CCNC: 18 U/L (ref 12–65)
ANION GAP SERPL CALC-SCNC: 10 MMOL/L (ref 4–13)
AST SERPL-CCNC: 17 U/L (ref 15–37)
BASOPHILS # BLD: 0 K/UL (ref 0–0.2)
BASOPHILS NFR BLD: 0 % (ref 0–2)
BILIRUB SERPL-MCNC: 0.4 MG/DL (ref 0.2–1.1)
BUN SERPL-MCNC: 7 MG/DL (ref 6–23)
CALCIUM SERPL-MCNC: 7.8 MG/DL (ref 8.3–10.4)
CHLORIDE SERPL-SCNC: 100 MMOL/L (ref 101–110)
CO2 SERPL-SCNC: 26 MMOL/L (ref 21–32)
CREAT SERPL-MCNC: 0.5 MG/DL (ref 0.6–1)
DIFFERENTIAL METHOD BLD: ABNORMAL
EOSINOPHIL # BLD: 0 K/UL (ref 0–0.8)
EOSINOPHIL NFR BLD: 0 % (ref 0.5–7.8)
ERYTHROCYTE [DISTWIDTH] IN BLOOD BY AUTOMATED COUNT: 15.9 % (ref 11.9–14.6)
GLOBULIN SER CALC-MCNC: 3.5 G/DL (ref 2.3–3.5)
GLUCOSE SERPL-MCNC: 97 MG/DL (ref 65–100)
HCT VFR BLD AUTO: 28.8 % (ref 35.8–46.3)
HGB BLD-MCNC: 9.1 G/DL (ref 11.7–15.4)
IMM GRANULOCYTES # BLD AUTO: 0.1 K/UL (ref 0–0.5)
IMM GRANULOCYTES NFR BLD AUTO: 1 % (ref 0–5)
LACTATE SERPL-SCNC: 0.6 MMOL/L (ref 0.4–2)
LYMPHOCYTES # BLD: 1 K/UL (ref 0.5–4.6)
LYMPHOCYTES NFR BLD: 10 % (ref 13–44)
MAGNESIUM SERPL-MCNC: 1.1 MG/DL (ref 1.8–2.4)
MCH RBC QN AUTO: 28 PG (ref 26.1–32.9)
MCHC RBC AUTO-ENTMCNC: 31.6 G/DL (ref 31.4–35)
MCV RBC AUTO: 88.6 FL (ref 79.6–97.8)
MONOCYTES # BLD: 0.7 K/UL (ref 0.1–1.3)
MONOCYTES NFR BLD: 7 % (ref 4–12)
NEUTS SEG # BLD: 8.1 K/UL (ref 1.7–8.2)
NEUTS SEG NFR BLD: 82 % (ref 43–78)
NRBC # BLD: 0 K/UL (ref 0–0.2)
PLATELET # BLD AUTO: 306 K/UL (ref 150–450)
PMV BLD AUTO: 10.2 FL (ref 9.4–12.3)
POTASSIUM SERPL-SCNC: 2.7 MMOL/L (ref 3.5–5.1)
PROCALCITONIN SERPL-MCNC: <0.05 NG/ML (ref 0–0.49)
PROT SERPL-MCNC: 5.2 G/DL (ref 6.3–8.2)
RBC # BLD AUTO: 3.25 M/UL (ref 4.05–5.2)
SODIUM SERPL-SCNC: 136 MMOL/L (ref 136–145)
WBC # BLD AUTO: 9.8 K/UL (ref 4.3–11.1)

## 2022-10-03 PROCEDURE — 6360000004 HC RX CONTRAST MEDICATION: Performed by: EMERGENCY MEDICINE

## 2022-10-03 PROCEDURE — 81001 URINALYSIS AUTO W/SCOPE: CPT

## 2022-10-03 PROCEDURE — 84145 PROCALCITONIN (PCT): CPT

## 2022-10-03 PROCEDURE — 2580000003 HC RX 258: Performed by: FAMILY MEDICINE

## 2022-10-03 PROCEDURE — 6370000000 HC RX 637 (ALT 250 FOR IP): Performed by: FAMILY MEDICINE

## 2022-10-03 PROCEDURE — 6360000002 HC RX W HCPCS: Performed by: FAMILY MEDICINE

## 2022-10-03 PROCEDURE — 96374 THER/PROPH/DIAG INJ IV PUSH: CPT

## 2022-10-03 PROCEDURE — 96375 TX/PRO/DX INJ NEW DRUG ADDON: CPT

## 2022-10-03 PROCEDURE — 80053 COMPREHEN METABOLIC PANEL: CPT

## 2022-10-03 PROCEDURE — 87086 URINE CULTURE/COLONY COUNT: CPT

## 2022-10-03 PROCEDURE — 2580000003 HC RX 258: Performed by: EMERGENCY MEDICINE

## 2022-10-03 PROCEDURE — 1100000000 HC RM PRIVATE

## 2022-10-03 PROCEDURE — 87040 BLOOD CULTURE FOR BACTERIA: CPT

## 2022-10-03 PROCEDURE — 83735 ASSAY OF MAGNESIUM: CPT

## 2022-10-03 PROCEDURE — 6360000002 HC RX W HCPCS: Performed by: EMERGENCY MEDICINE

## 2022-10-03 PROCEDURE — 71260 CT THORAX DX C+: CPT

## 2022-10-03 PROCEDURE — C1729 CATH, DRAINAGE: HCPCS

## 2022-10-03 PROCEDURE — 83605 ASSAY OF LACTIC ACID: CPT

## 2022-10-03 PROCEDURE — 85025 COMPLETE CBC W/AUTO DIFF WBC: CPT

## 2022-10-03 PROCEDURE — 99285 EMERGENCY DEPT VISIT HI MDM: CPT

## 2022-10-03 PROCEDURE — 93005 ELECTROCARDIOGRAM TRACING: CPT | Performed by: EMERGENCY MEDICINE

## 2022-10-03 RX ORDER — OXYCODONE HYDROCHLORIDE 5 MG/1
5 TABLET ORAL EVERY 6 HOURS PRN
Status: DISCONTINUED | OUTPATIENT
Start: 2022-10-03 | End: 2022-10-08 | Stop reason: HOSPADM

## 2022-10-03 RX ORDER — ACETAMINOPHEN 650 MG/1
650 SUPPOSITORY RECTAL EVERY 6 HOURS PRN
Status: DISCONTINUED | OUTPATIENT
Start: 2022-10-03 | End: 2022-10-08 | Stop reason: HOSPADM

## 2022-10-03 RX ORDER — MORPHINE SULFATE 2 MG/ML
2 INJECTION, SOLUTION INTRAMUSCULAR; INTRAVENOUS
Status: COMPLETED | OUTPATIENT
Start: 2022-10-03 | End: 2022-10-03

## 2022-10-03 RX ORDER — SUCRALFATE 1 G/1
1 TABLET ORAL
Status: DISCONTINUED | OUTPATIENT
Start: 2022-10-03 | End: 2022-10-08 | Stop reason: HOSPADM

## 2022-10-03 RX ORDER — ERGOCALCIFEROL 1.25 MG/1
50000 CAPSULE ORAL
Status: DISCONTINUED | OUTPATIENT
Start: 2022-10-06 | End: 2022-10-08 | Stop reason: HOSPADM

## 2022-10-03 RX ORDER — PANTOPRAZOLE SODIUM 40 MG/1
40 TABLET, DELAYED RELEASE ORAL 2 TIMES DAILY
Status: DISCONTINUED | OUTPATIENT
Start: 2022-10-03 | End: 2022-10-08 | Stop reason: HOSPADM

## 2022-10-03 RX ORDER — SODIUM CHLORIDE 9 MG/ML
INJECTION, SOLUTION INTRAVENOUS PRN
Status: DISCONTINUED | OUTPATIENT
Start: 2022-10-03 | End: 2022-10-08 | Stop reason: HOSPADM

## 2022-10-03 RX ORDER — SODIUM CHLORIDE 0.9 % (FLUSH) 0.9 %
5-40 SYRINGE (ML) INJECTION PRN
Status: DISCONTINUED | OUTPATIENT
Start: 2022-10-03 | End: 2022-10-08 | Stop reason: HOSPADM

## 2022-10-03 RX ORDER — SODIUM CHLORIDE 0.9 % (FLUSH) 0.9 %
10 SYRINGE (ML) INJECTION
Status: COMPLETED | OUTPATIENT
Start: 2022-10-03 | End: 2022-10-03

## 2022-10-03 RX ORDER — POTASSIUM CHLORIDE 7.45 MG/ML
10 INJECTION INTRAVENOUS
Status: COMPLETED | OUTPATIENT
Start: 2022-10-03 | End: 2022-10-04

## 2022-10-03 RX ORDER — VALACYCLOVIR HYDROCHLORIDE 500 MG/1
500 TABLET, FILM COATED ORAL DAILY
Status: DISCONTINUED | OUTPATIENT
Start: 2022-10-04 | End: 2022-10-08 | Stop reason: HOSPADM

## 2022-10-03 RX ORDER — ONDANSETRON 2 MG/ML
4 INJECTION INTRAMUSCULAR; INTRAVENOUS ONCE
Status: COMPLETED | OUTPATIENT
Start: 2022-10-03 | End: 2022-10-03

## 2022-10-03 RX ORDER — POTASSIUM CHLORIDE 7.45 MG/ML
10 INJECTION INTRAVENOUS
Status: COMPLETED | OUTPATIENT
Start: 2022-10-03 | End: 2022-10-03

## 2022-10-03 RX ORDER — POLYETHYLENE GLYCOL 3350 17 G/17G
17 POWDER, FOR SOLUTION ORAL DAILY PRN
Status: DISCONTINUED | OUTPATIENT
Start: 2022-10-03 | End: 2022-10-08 | Stop reason: HOSPADM

## 2022-10-03 RX ORDER — ONDANSETRON 4 MG/1
4 TABLET, ORALLY DISINTEGRATING ORAL EVERY 8 HOURS PRN
Status: DISCONTINUED | OUTPATIENT
Start: 2022-10-03 | End: 2022-10-08 | Stop reason: HOSPADM

## 2022-10-03 RX ORDER — ACETAMINOPHEN 325 MG/1
650 TABLET ORAL EVERY 6 HOURS PRN
Status: DISCONTINUED | OUTPATIENT
Start: 2022-10-03 | End: 2022-10-08 | Stop reason: HOSPADM

## 2022-10-03 RX ORDER — DOCUSATE SODIUM 100 MG/1
100 CAPSULE, LIQUID FILLED ORAL 2 TIMES DAILY
Status: DISCONTINUED | OUTPATIENT
Start: 2022-10-03 | End: 2022-10-08 | Stop reason: HOSPADM

## 2022-10-03 RX ORDER — ONDANSETRON 2 MG/ML
4 INJECTION INTRAMUSCULAR; INTRAVENOUS
Status: COMPLETED | OUTPATIENT
Start: 2022-10-03 | End: 2022-10-03

## 2022-10-03 RX ORDER — ALBUMIN (HUMAN) 12.5 G/50ML
25 SOLUTION INTRAVENOUS EVERY 6 HOURS
Status: COMPLETED | OUTPATIENT
Start: 2022-10-03 | End: 2022-10-05

## 2022-10-03 RX ORDER — SODIUM CHLORIDE 0.9 % (FLUSH) 0.9 %
5-40 SYRINGE (ML) INJECTION EVERY 12 HOURS SCHEDULED
Status: DISCONTINUED | OUTPATIENT
Start: 2022-10-03 | End: 2022-10-08 | Stop reason: HOSPADM

## 2022-10-03 RX ORDER — 0.9 % SODIUM CHLORIDE 0.9 %
100 INTRAVENOUS SOLUTION INTRAVENOUS ONCE
Status: COMPLETED | OUTPATIENT
Start: 2022-10-03 | End: 2022-10-03

## 2022-10-03 RX ORDER — ONDANSETRON 2 MG/ML
4 INJECTION INTRAMUSCULAR; INTRAVENOUS EVERY 6 HOURS PRN
Status: DISCONTINUED | OUTPATIENT
Start: 2022-10-03 | End: 2022-10-08 | Stop reason: HOSPADM

## 2022-10-03 RX ORDER — SACCHAROMYCES BOULARDII 250 MG
250 CAPSULE ORAL 2 TIMES DAILY
Status: DISCONTINUED | OUTPATIENT
Start: 2022-10-03 | End: 2022-10-08 | Stop reason: HOSPADM

## 2022-10-03 RX ORDER — MORPHINE SULFATE 2 MG/ML
2 INJECTION, SOLUTION INTRAMUSCULAR; INTRAVENOUS EVERY 4 HOURS PRN
Status: DISCONTINUED | OUTPATIENT
Start: 2022-10-03 | End: 2022-10-08 | Stop reason: HOSPADM

## 2022-10-03 RX ORDER — METRONIDAZOLE 500 MG/1
500 TABLET ORAL EVERY 8 HOURS SCHEDULED
Status: DISCONTINUED | OUTPATIENT
Start: 2022-10-03 | End: 2022-10-08 | Stop reason: HOSPADM

## 2022-10-03 RX ORDER — METOCLOPRAMIDE 10 MG/1
5 TABLET ORAL DAILY
Status: DISCONTINUED | OUTPATIENT
Start: 2022-10-04 | End: 2022-10-08 | Stop reason: HOSPADM

## 2022-10-03 RX ORDER — SENNA AND DOCUSATE SODIUM 50; 8.6 MG/1; MG/1
2 TABLET, FILM COATED ORAL DAILY PRN
Status: DISCONTINUED | OUTPATIENT
Start: 2022-10-03 | End: 2022-10-08 | Stop reason: HOSPADM

## 2022-10-03 RX ORDER — MAGNESIUM SULFATE IN WATER 40 MG/ML
2000 INJECTION, SOLUTION INTRAVENOUS ONCE
Status: COMPLETED | OUTPATIENT
Start: 2022-10-03 | End: 2022-10-03

## 2022-10-03 RX ADMIN — Medication 250 MG: at 22:05

## 2022-10-03 RX ADMIN — PANTOPRAZOLE SODIUM 40 MG: 40 TABLET, DELAYED RELEASE ORAL at 22:05

## 2022-10-03 RX ADMIN — CEFTRIAXONE 1000 MG: 1 INJECTION, POWDER, FOR SOLUTION INTRAMUSCULAR; INTRAVENOUS at 23:55

## 2022-10-03 RX ADMIN — IOPAMIDOL 100 ML: 755 INJECTION, SOLUTION INTRAVENOUS at 17:15

## 2022-10-03 RX ADMIN — SODIUM CHLORIDE, PRESERVATIVE FREE 10 ML: 5 INJECTION INTRAVENOUS at 23:41

## 2022-10-03 RX ADMIN — SUCRALFATE 1 G: 1 TABLET ORAL at 22:05

## 2022-10-03 RX ADMIN — MAGNESIUM SULFATE HEPTAHYDRATE 2000 MG: 40 INJECTION, SOLUTION INTRAVENOUS at 22:18

## 2022-10-03 RX ADMIN — ONDANSETRON 4 MG: 2 INJECTION INTRAMUSCULAR; INTRAVENOUS at 16:32

## 2022-10-03 RX ADMIN — ONDANSETRON 4 MG: 2 INJECTION INTRAMUSCULAR; INTRAVENOUS at 18:27

## 2022-10-03 RX ADMIN — POTASSIUM CHLORIDE 10 MEQ: 7.46 INJECTION, SOLUTION INTRAVENOUS at 18:27

## 2022-10-03 RX ADMIN — SODIUM CHLORIDE, PRESERVATIVE FREE 10 ML: 5 INJECTION INTRAVENOUS at 17:15

## 2022-10-03 RX ADMIN — DOCUSATE SODIUM 100 MG: 100 CAPSULE, LIQUID FILLED ORAL at 22:05

## 2022-10-03 RX ADMIN — APIXABAN 5 MG: 5 TABLET, FILM COATED ORAL at 22:05

## 2022-10-03 RX ADMIN — ONDANSETRON 4 MG: 2 INJECTION INTRAMUSCULAR; INTRAVENOUS at 22:18

## 2022-10-03 RX ADMIN — METRONIDAZOLE 500 MG: 500 TABLET ORAL at 22:05

## 2022-10-03 RX ADMIN — POTASSIUM CHLORIDE 10 MEQ: 7.46 INJECTION, SOLUTION INTRAVENOUS at 22:06

## 2022-10-03 RX ADMIN — POTASSIUM CHLORIDE 10 MEQ: 7.46 INJECTION, SOLUTION INTRAVENOUS at 23:55

## 2022-10-03 RX ADMIN — SODIUM CHLORIDE 100 ML: 9 INJECTION, SOLUTION INTRAVENOUS at 17:15

## 2022-10-03 RX ADMIN — MORPHINE SULFATE 2 MG: 2 INJECTION, SOLUTION INTRAMUSCULAR; INTRAVENOUS at 16:40

## 2022-10-03 ASSESSMENT — PAIN - FUNCTIONAL ASSESSMENT: PAIN_FUNCTIONAL_ASSESSMENT: 0-10

## 2022-10-03 ASSESSMENT — ENCOUNTER SYMPTOMS
BACK PAIN: 0
HEARTBURN: 0
VOMITING: 0
ABDOMINAL PAIN: 1
NAUSEA: 0
SHORTNESS OF BREATH: 0
TROUBLE SWALLOWING: 0
ORTHOPNEA: 0

## 2022-10-03 ASSESSMENT — PAIN DESCRIPTION - LOCATION
LOCATION: ABDOMEN;CHEST
LOCATION: BACK;ABDOMEN;CHEST

## 2022-10-03 ASSESSMENT — PAIN SCALES - GENERAL
PAINLEVEL_OUTOF10: 9
PAINLEVEL_OUTOF10: 0
PAINLEVEL_OUTOF10: 9

## 2022-10-03 ASSESSMENT — PAIN DESCRIPTION - DESCRIPTORS
DESCRIPTORS: ACHING;SHARP
DESCRIPTORS: ACHING;PRESSURE

## 2022-10-03 NOTE — PROGRESS NOTES
Nutrition F/U:  Assessment:  Pt seen during office visit w/ Dr. Paulino Neff, disease progression noted during surgery - near bowel obstruction repaired, HIPEC not performed given disease progression. Pt will resume treatment in ~ 1 week - Taxol + Cyramza. Pt w/ recurrent abdominal ascites, pleurx drain in place - 500 ml removed yesterday by home health nurse, call placed to [facility] today to make sure they come out today and drain again. Pt w/ abdominal pain, ascites, early satiety, poor appetite/po intake, cancer cachexia and muscle wasting noted. Current BW: 122#, up 6# over the past week - ascites. Intervention:  1. Recommend try more full liquid diet, low fiber soft solids. 2. Taxol + Cyramza  3. Abdominal Pleurx - encouraged pt to replace volume lost daily if able     Monitoring/Evaluation:  1. RD to follow up during next office visit - follow up wt status, tolerance/intake of po diet, symptom management.       [address] [place] [address], [id number], [address] [facility]

## 2022-10-03 NOTE — ED PROVIDER NOTES
Straight Cath (Select if patient is unable to provide a sample)    Vital signs per unit routine    Notify physician    Up as tolerated    Daily weights    Intake and output    Elevate Head of Bed     Monitor for signs/symptoms of urinary retention    Straight cath    Place intermittent pneumatic compression device    Misc nursing order (specify)    Verify informed consent    Full code    Inpatient consult to Oncology    Inpatient consult to Physical Therapy    Inpatient consult to Occupational Therapy    Inpatient consult to Infectious Diseases    Initiate Oxygen Therapy Protocol    Pulse Oximetry Spot Check    Initiate PACU Oxygen Therapy Protocol    EKG 12 Lead    TYPE AND SCREEN    Saline lock IV    ADMIT TO INPATIENT    Transfer Patient        Medications   apixaban (ELIQUIS) tablet 5 mg ( Oral Automatically Held 10/9/22 2100)   docusate sodium (COLACE) capsule 100 mg (100 mg Oral Not Given 10/6/22 2114)   vitamin D (ERGOCALCIFEROL) capsule 50,000 Units (50,000 Units Oral Not Given 10/6/22 1005)   metoclopramide (REGLAN) tablet 5 mg (5 mg Oral Given 10/6/22 0830)   oxyCODONE (ROXICODONE) immediate release tablet 5 mg (5 mg Oral Given 10/6/22 1100)   pantoprazole (PROTONIX) tablet 40 mg (40 mg Oral Given 10/6/22 2114)   sennosides-docusate sodium (SENOKOT-S) 8.6-50 MG tablet 2 tablet (has no administration in time range)   sucralfate (CARAFATE) tablet 1 g (1 g Oral Not Given 10/6/22 2115)   valACYclovir (VALTREX) tablet 500 mg (500 mg Oral Not Given 10/6/22 1004)   sodium chloride flush 0.9 % injection 5-40 mL (10 mLs IntraVENous Given 10/6/22 2115)   sodium chloride flush 0.9 % injection 5-40 mL (has no administration in time range)   0.9 % sodium chloride infusion (has no administration in time range)   ondansetron (ZOFRAN-ODT) disintegrating tablet 4 mg ( Oral See Alternative 10/4/22 0609)     Or   ondansetron (ZOFRAN) injection 4 mg (4 mg IntraVENous Given 10/4/22 0609)   polyethylene glycol (GLYCOLAX) packet 17 g (has no administration in time range)   acetaminophen (TYLENOL) tablet 650 mg (650 mg Oral Given 10/5/22 0354)     Or   acetaminophen (TYLENOL) suppository 650 mg ( Rectal See Alternative 10/5/22 0354)   metroNIDAZOLE (FLAGYL) tablet 500 mg (500 mg Oral Given 10/6/22 2114)   saccharomyces boulardii (FLORASTOR) capsule 250 mg (250 mg Oral Not Given 10/6/22 2114)   morphine injection 2 mg (2 mg IntraVENous Given 10/6/22 1907)   prochlorperazine (COMPAZINE) injection 10 mg (10 mg IntraVENous Given 10/6/22 0830)   potassium chloride 20 mEq/50 mL IVPB (Central Line) ( IntraVENous Canceled Entry 10/5/22 0912)   cefTRIAXone (ROCEPHIN) 2,000 mg in sodium chloride 0.9 % 50 mL IVPB mini-bag (0 mg IntraVENous Stopped 10/6/22 1400)   lactated ringers infusion (0 mLs IntraVENous Held 10/6/22 1859)   ondansetron (ZOFRAN) injection 4 mg (4 mg IntraVENous Given 10/3/22 1632)   morphine (PF) injection 2 mg (2 mg IntraVENous Given 10/3/22 1640)   sodium chloride flush 0.9 % injection 10 mL (10 mLs IntraVENous Given 10/3/22 1715)   0.9 % sodium chloride bolus (0 mLs IntraVENous Stopped 10/3/22 1735)   iopamidol (ISOVUE-370) 76 % injection 100 mL (100 mLs IntraVENous Given 10/3/22 1715)   potassium chloride 10 mEq/100 mL IVPB (Peripheral Line) (0 mEq IntraVENous Stopped 10/3/22 2020)   ondansetron (ZOFRAN) injection 4 mg (4 mg IntraVENous Given 10/3/22 1827)   potassium chloride 10 mEq/100 mL IVPB (Peripheral Line) (10 mEq IntraVENous New Bag 10/4/22 0513)   magnesium sulfate 2000 mg in 50 mL IVPB premix (0 mg IntraVENous Stopped 10/3/22 2341)   albumin human 25 % IV solution 25 g (0 g IntraVENous Stopped 10/5/22 0931)   potassium chloride 20 mEq/50 mL IVPB (Central Line) (20 mEq IntraVENous New Bag 10/5/22 0924)   magnesium sulfate 2000 mg in 50 mL IVPB premix (0 mg IntraVENous Stopped 10/5/22 1851)   fosaprepitant (EMEND) 150 mg in sodium chloride 0.9 % 150 mL IVPB (0 mg IntraVENous Stopped 10/5/22 1851)   potassium chloride 20 mEq/50 mL IVPB (Central Line) (20 mEq IntraVENous New Bag 10/6/22 1003)   acetaminophen (TYLENOL) tablet 1,000 mg (1,000 mg Oral Given 10/6/22 1410)   aprepitant (EMEND) capsule 40 mg (40 mg Oral Given 10/6/22 1423)       Current Discharge Medication List           Senaida Seip is a 52 y.o. female who presents to the Emergency Department with chief complaint of    Chief Complaint   Patient presents with    Chest Pain      Patient is a 43-year-old female with a history of peritoneal carcinomatosis s/p Pleurx catheter placement on 9/26/2022 who presents with continued abdominal pain. Patient states when she was discharged on 9/27/2022, she did not have the supplies in order to drain abdominal ascites using a Pleurx catheter. Patient also states she has lower abdominal pain which has been chronic in nature since the diagnosis of her cancer. Patient also states she has left-sided chest pain intermittent lasting a few seconds at a time associated with some shortness of breath. Patient does have a history of pulmonary emboli and she is currently on Eliquis. Patient has a history of metastatic carcinoma of unknown primary and is followed by Dr. Elda Driver. Patient underwent a ex lap 8/2022 for debulking of tumor. Patient denies any fevers or chills and denies any diarrhea. The history is provided by the patient.    Chest Pain  Pain location:  L chest  Pain quality: aching and dull    Pain radiates to:  Does not radiate  Pain severity:  Mild  Onset quality:  Gradual  Duration:  1 week  Timing:  Intermittent  Progression:  Resolved  Chronicity:  Recurrent  Context: breathing and movement    Relieved by:  Nothing  Worsened by:  Nothing  Ineffective treatments:  None tried  Associated symptoms: abdominal pain    Associated symptoms: no anorexia, no anxiety, no back pain, no claudication, no dizziness, no dysphagia, no fatigue, no fever, no headache, no heartburn, no lower extremity edema, no nausea, no numbness, no orthopnea, no palpitations, no PND, no shortness of breath, no syncope, no vomiting and no weakness    Risk factors: prior DVT/PE    Risk factors: no aortic disease and no high cholesterol        Review of Systems   Constitutional:  Negative for fatigue and fever. HENT:  Negative for trouble swallowing. Respiratory:  Negative for shortness of breath. Cardiovascular:  Positive for chest pain. Negative for palpitations, orthopnea, claudication, syncope and PND. Gastrointestinal:  Positive for abdominal pain. Negative for anorexia, heartburn, nausea and vomiting. Musculoskeletal:  Negative for back pain. Neurological:  Negative for dizziness, weakness, numbness and headaches. All other systems reviewed and are negative.     Past Medical History:   Diagnosis Date    Anemia     -- not a problem since hyst    Colon cancer ([address] dx 2022     plans for chemo--- followed by dr Vilma RODRIGUEZID-19 2020    no hospitalization    GERD (gastroesophageal reflux disease)     managed with med    History of colonoscopy 2018    louis Kim (see media note), R     Hx of blood clots 2022    per pt \"small clot on lung identified by CT scan\"  CT Scan impression:- Nonobstructive pulmonary filling defect involving Left Lower Lobe     Hypotension     asymptomatic    Obstruction of fallopian tube     per pt has \"1 good tube\"    Peritoneal carcinomatosis ([facility] [address]     Weight loss     80lbs weight loss after gastric sleeve        Past Surgical History:   Procedure Laterality Date     SECTION      CYSTOSCOPY Bilateral 2022    CYSTOSCOPY BILATERAL RETROGRADE PYELOGRAM performed by Juan Villatoro MD at [address] [place] [address] A Bilateral 2022    BILATERAL CYSTOSCOPY URETERAL STENT EXCHANGE performed by Juan Villatoro MD at [address]  2014    gastric sleeve- Choudhari    HYSTERECTOMY (CERVIX STATUS UNKNOWN)      2018    HYSTERECTOMY (CERVIX STATUS UNKNOWN) 07/09/2020    TLH w/ Bilateral salpingectomy and left oophorectomy    IR PERC CATH PLEURAL DRAIN W/IMAG  9/26/2022    IR PERC CATH PLEURAL DRAIN W/IMAG 9/26/2022 [facility]    IR PORT PLACEMENT EQUAL OR GREATER THAN 5 YEARS  2/28/2022    IR PORT PLACEMENT EQUAL OR GREATER THAN 5 YEARS  2/28/2022    IR PORT PLACEMENT EQUAL OR GREATER THAN 5 YEARS 2/28/2022 [facility]    LAPAROTOMY N/A 8/17/2022    EXPLORATORY LAPAROTOMY/ ENTEROENTEROSTOMY performed by Shahnaz Napoles MD at [facility]. [address]  age Maida Staff 21s\"    also \"unblocked her FT\"    TONSILLECTOMY      UPPER GASTROINTESTINAL ENDOSCOPY      with dilation    UPPER GASTROINTESTINAL ENDOSCOPY N/A 9/13/2022    EGD ESOPHAGOGASTRODUODENOSCOPY OFL 17 To IR after recovery for Para performed by Jyoti Hanna MD at [facility] ENDOSCOPY    UROLOGICAL SURGERY Bilateral 03/15/2022    cysto        Family History   Problem Relation Age of Onset    Heart Disease Maternal Grandmother     Hypertension Maternal Grandmother     Heart Disease Maternal Grandfather     Hypertension Maternal Grandfather     Pulmonary Embolism Neg Hx     Colon Cancer Neg Hx     Deep Vein Thrombosis Neg Hx     Ovarian Cancer Neg Hx     Prostate Cancer Neg Hx     Breast Cancer Neg Hx         Social History     Socioeconomic History    Marital status: Single     Spouse name: None    Number of children: None    Years of education: None    Highest education level: None   Tobacco Use    Smoking status: Never    Smokeless tobacco: Never   Vaping Use    Vaping Use: Never used   Substance and Sexual Activity    Alcohol use: Not Currently    Drug use: No   Social History Narrative    1. Use large speculum. 2.  sister, Bernardino Cheatham #[id number] and mom is Enrrique Alcazar #[id number] (both Kofoed's pts)  3. PCP  Dr. Cristela Peguero ([facility]), GI Dr. Serjio Grimm Bello-2018 normal EGD         Patient has no known allergies.      Current Discharge Medication List        CONTINUE these medications which have NOT CHANGED    Details   oxyCODONE (ROXICODONE) 5 MG immediate release tablet Take 1 tablet by mouth every 6 hours as needed for Pain for up to 30 days. Intended supply: 3 days. Take lowest dose possible to manage pain  Qty: 90 tablet, Refills: 0    Comments: Reduce doses taken as pain becomes manageable  Associated Diagnoses: Peritoneal carcinomatosis (HCC)      metoclopramide (REGLAN) 5 MG tablet Take 1 tablet by mouth daily  Qty: 120 tablet, Refills: 3      magnesium oxide (MAG-OX) 400 MG tablet Take 1 tablet by mouth 2 times daily  Qty: 60 tablet, Refills: 1      sennosides-docusate sodium (SENOKOT-S) 8.6-50 MG tablet Take 2 tablets by mouth daily as needed for Constipation  Qty: 90 tablet, Refills: 0      polyethylene glycol (GLYCOLAX) 17 g packet Take 17 g by mouth daily as needed for Constipation  Qty: 527 g, Refills: 1      sucralfate (CARAFATE) 1 GM tablet Take 1 tablet by mouth 4 times daily  Qty: 120 tablet, Refills: 3      pantoprazole (PROTONIX) 40 MG tablet Take 1 tablet by mouth in the morning and at bedtime  Qty: 60 tablet, Refills: 0      docusate sodium (COLACE, DULCOLAX) 100 MG CAPS Take 100 mg by mouth 2 times daily      apixaban (ELIQUIS) 5 MG TABS tablet Take 1 tablet by mouth in the morning and 1 tablet before bedtime. Qty: 60 tablet, Refills: 0    Associated Diagnoses: Carcinoma of unknown primary ([facility] [address]  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 (HCC)      potassium chloride (KLOR-CON M) 20 MEQ extended release tablet Take 1 tablet by mouth 2 times daily  Qty: 60 tablet, Refills: 1      ergocalciferol (ERGOCALCIFEROL) 1.25 MG (80509 UT) capsule Take 50,000 Units by mouth every 7 days Takes on Thursday      lidocaine-prilocaine (EMLA) 2.5-2.5 % cream Apply to port about 45 minutes prior to access      ondansetron (ZOFRAN) 8 MG tablet Take 8 mg by mouth every 8 hours as needed      valACYclovir (VALTREX) 500 MG tablet Take 500 mg by mouth daily              Vitals signs and nursing note reviewed. Patient Vitals for the past 4 hrs:   Temp Pulse Resp BP SpO2   10/06/22 2248 97.5 °F (36.4 °C) 65 16 108/71 96 %          Physical Exam  Vitals and nursing note reviewed. Constitutional:       Appearance: Normal appearance. HENT:      Head: Normocephalic and atraumatic. Nose: Nose normal.      Mouth/Throat:      Mouth: Mucous membranes are moist.      Pharynx: Oropharynx is clear. Eyes:      Extraocular Movements: Extraocular movements intact. Conjunctiva/sclera: Conjunctivae normal.      Pupils: Pupils are equal, round, and reactive to light. Cardiovascular:      Rate and Rhythm: Normal rate. Pulses: Normal pulses. Pulmonary:      Effort: Pulmonary effort is normal.   Abdominal:      General: Abdomen is flat. Bowel sounds are normal.      Palpations: Abdomen is soft. Comments: Pleurx catheter in place with clear fluid. Patient abdomen is distended with ascites and has no rebound or guarding. Musculoskeletal:         General: Normal range of motion. Cervical back: Normal range of motion and neck supple. Skin:     General: Skin is warm and dry. Capillary Refill: Capillary refill takes less than 2 seconds. Neurological:      General: No focal deficit present. Mental Status: She is alert and oriented to person, place, and time. Mental status is at baseline. Psychiatric:         Mood and Affect: Mood normal.         Behavior: Behavior normal.         Thought Content:  Thought content normal.         Judgment: Judgment normal.        Procedures    Results for orders placed or performed in visit on 10/06/22   Airway    Narrative    SCOTT Anderson CRNA     10/6/2022  4:39 PM  Airway  Date/Time: 10/6/2022 4:24 PM  Urgency: elective    Airway not difficult    General Information and Staff    Patient location during procedure: OR  Resident/CRNA: SCOTT Anderosn CRNA  Performed: resident/CRNA     Indications and Patient Condition  Indications for airway management: anesthesia  Spontaneous Ventilation: absent  Sedation level: deep  Preoxygenated: yes  Patient position: sniffing  Mask difficulty assessment: vent by bag mask    Final Airway Details  Final airway type: supraglottic airway      Successful airway: oropharyngeal  Size 3     Number of attempts at approach: 1  Ventilation between attempts: supraglottic airway  Number of other approaches attempted: 0     Results for orders placed or performed during the hospital encounter of 10/03/22   Culture, Blood 1    Specimen: Blood   Result Value Ref Range    Special Requests PORT      Culture NO GROWTH 3 DAYS     Culture, Blood 1    Specimen: Blood   Result Value Ref Range    Special Requests LEFT  Antecubital        Culture NO GROWTH 3 DAYS     Culture, Urine    Specimen: Urine   Result Value Ref Range    Special Requests NO SPECIAL REQUESTS  Reflexed from [id number]        Culture >100,000 COLONIES/mL MIXED SKIN MARIA ESTHER ISOLATED      Culture        THREE OR MORE TYPES OF ORGANISMS ARE PRESENT. THIS IS INDICATIVE OF CONTAMINATION DUE TO IMPROPER COLLECTION TECHNIQUE. PLEASE REPEAT COLLECTION UNLESS PATIENT HAS STARTED ANTIBIOTIC TREATMENT. Culture, Body Fluid    Specimen: Ascitic Fluid; Body Fluid   Result Value Ref Range    Special Requests NO SPECIAL REQUESTS      Gram stain NO WBC'S SEEN      Gram stain NO DEFINITE ORGANISM SEEN      Culture NO GROWTH 1 DAY     XR CHEST PORTABLE    Narrative    EXAMINATION: XR CHEST PORTABLE 10/3/2022 4:04 PM    ACCESSION NUMBER: [id number]    COMPARISON: Chest radiograph dated 8/17/2022    INDICATION: Sepsis    TECHNIQUE: A single view of the chest was obtained. FINDINGS:   Support Devices:   *  Right IJ port catheter tip overlies the right atrium. Cardiac Silhouette: Within normal limits in size. Mediastinum: Normal mediastinal contours. Lungs: Low lung volumes. No airspace consolidation. No pneumothorax or sizable  pleural effusion.     Upper Abdomen: Normal     Miscellaneous: No fracture or suspicious osseous lesion. Impression    Low lung volumes. No focal airspace consolidation. CT CHEST ABDOMEN PELVIS W CONTRAST    Narrative    CT CHEST ABDOMEN AND PELVIS WITH CONTRAST  10/3/2022 5:15 PM    HISTORY:  PE; hx of PE; left chest pain; diffuse abdominal pain. Peritoneal  carcinomatosis. TECHNIQUE: The patient received 100 mL Isovue-370 nonionic IV contrast and axial  images were obtained through the chest, abdomen, and pelvis. Multiplanar  reformatted images were generated. All CT scans at this facility used dose  modulation, interactive reconstruction and/or weight based dosing when  appropriate to reduce radiation dose to as low as reasonably achievable. COMPARISON:  June 22, 2022    CHEST: There are no filling defects within the main or proximal segmental  pulmonary artery suggestive of emboli. The thoracic aorta is well-opacified without dissection. There is no thoracic adenopathy. A right-sided chest port is present with  catheter tip in right atrium. Bilateral pleural effusions are present. There is lower lobe dependent  atelectasis. A few groundglass opacities in the right upper lobe may be areas  of active alveolitis (image 48). ABDOMEN: A percutaneous catheter or drain is present in the right upper  quadrant. A surgical staple line along the outer margin of the stomach is  compatible with a sleeve gastrectomy. Gallbladder: Mucosal enhancement. Pericholecystic fluid. No visible stones. Liver: Mild intrahepatic biliary ductal dilatation. No focal hepatic lesions. Pancreas: Normal.    Spleen: Normal.    Adrenal glands: Normal.    Kidneys: Bilateral ureteral stents are present with proximal loops in the  distended renal pelvis and distal loops in the bladder. Bilateral significant  hydronephrosis has progressed since June 2022. Bowel: The appendix is mildly distended.   There is diffuse bowel wall thickening  involving the colon and there are several dilated loops small bowel. This  appearance may be caused by serosal tumor implants or colitis. Small volume  ascites is present and there is diffuse heterogeneous enhancement of the omental  fat (image 28) compatible with metastatic omental implants. Retroperitoneum:No adenopathy. Abdominal aorta is normal in caliber. PELVIS:Ascites. Diffuse bladder wall thickening. Hysterectomy. Bones: There are no aggressive osseous lesions. Impression    1. No pulmonary embolism. 2.  Mild biliary ductal dilatation. 3.  Omental tumor implants, ascites and diffuse bowel wall thickening may be  caused by serosal tumor implants or colitis. 4.  Progression of bilateral hydronephrosis. NC XR TECHNOLOGIST SERVICE    Narrative    Radiology exam is complete. No Radiologist dictation. Please follow up   with ordering provider.       Lactic Acid   Result Value Ref Range    Lactic Acid, Plasma 0.6 0.4 - 2.0 MMOL/L   CBC with Auto Differential   Result Value Ref Range    WBC 9.8 4.3 - 11.1 K/uL    RBC 3.25 (L) 4.05 - 5.2 M/uL    Hemoglobin 9.1 (L) 11.7 - 15.4 g/dL    Hematocrit 28.8 (L) 35.8 - 46.3 %    MCV 88.6 79.6 - 97.8 FL    MCH 28.0 26.1 - 32.9 PG    MCHC 31.6 31.4 - 35.0 g/dL    RDW 15.9 (H) 11.9 - 14.6 %    Platelets 332 854 - 350 K/uL    MPV 10.2 9.4 - 12.3 FL    nRBC 0.00 0.0 - 0.2 K/uL    Differential Type AUTOMATED      Seg Neutrophils 82 (H) 43 - 78 %    Lymphocytes 10 (L) 13 - 44 %    Monocytes 7 4.0 - 12.0 %    Eosinophils % 0 (L) 0.5 - 7.8 %    Basophils 0 0.0 - 2.0 %    Immature Granulocytes 1 0.0 - 5.0 %    Segs Absolute 8.1 1.7 - 8.2 K/UL    Absolute Lymph # 1.0 0.5 - 4.6 K/UL    Absolute Mono # 0.7 0.1 - 1.3 K/UL    Absolute Eos # 0.0 0.0 - 0.8 K/UL    Basophils Absolute 0.0 0.0 - 0.2 K/UL    Absolute Immature Granulocyte 0.1 0.0 - 0.5 K/UL   CMP   Result Value Ref Range    Sodium 136 136 - 145 mmol/L    Potassium 2.7 (L) 3.5 - 5.1 mmol/L    Chloride 100 (L) 101 - 110 mmol/L    CO2 26 21 - 32 mmol/L    Anion Gap 10 4 - 13 mmol/L    Glucose 97 65 - 100 mg/dL    BUN 7 6 - 23 MG/DL    Creatinine 0.50 (L) 0.6 - 1.0 MG/DL    Est, Glom Filt Rate >60 >60 ml/min/1.73m2    Calcium 7.8 (L) 8.3 - 10.4 MG/DL    Total Bilirubin 0.4 0.2 - 1.1 MG/DL    ALT 18 12 - 65 U/L    AST 17 15 - 37 U/L    Alk Phosphatase 205 (H) 50 - 136 U/L    Total Protein 5.2 (L) 6.3 - 8.2 g/dL    Albumin 1.7 (L) 3.5 - 5.0 g/dL    Globulin 3.5 2.3 - 3.5 g/dL    Albumin/Globulin Ratio 0.5 (L) 1.2 - 3.5     Procalcitonin   Result Value Ref Range    Procalcitonin <0.05 0.00 - 0.49 ng/mL   Magnesium   Result Value Ref Range    Magnesium 1.1 (L) 1.8 - 2.4 mg/dL   Comprehensive Metabolic Panel w/ Reflex to MG   Result Value Ref Range    Sodium 136 136 - 145 mmol/L    Potassium 4.0 3.5 - 5.1 mmol/L    Chloride 100 (L) 101 - 110 mmol/L    CO2 28 21 - 32 mmol/L    Anion Gap 8 4 - 13 mmol/L    Glucose 75 65 - 100 mg/dL    BUN 6 6 - 23 MG/DL    Creatinine 0.50 (L) 0.6 - 1.0 MG/DL    Est, Glom Filt Rate >60 >60 ml/min/1.73m2    Calcium 7.8 (L) 8.3 - 10.4 MG/DL    Total Bilirubin 0.4 0.2 - 1.1 MG/DL    ALT 15 12 - 65 U/L    AST 9 (L) 15 - 37 U/L    Alk Phosphatase 134 50 - 136 U/L    Total Protein 4.7 (L) 6.3 - 8.2 g/dL    Albumin 2.5 (L) 3.5 - 5.0 g/dL    Globulin 2.2 (L) 2.3 - 3.5 g/dL    Albumin/Globulin Ratio 1.1 (L) 1.2 - 3.5     Magnesium   Result Value Ref Range    Magnesium 1.8 1.8 - 2.4 mg/dL   CBC with Auto Differential   Result Value Ref Range    WBC 8.0 4.3 - 11.1 K/uL    RBC 2.66 (L) 4.05 - 5.2 M/uL    Hemoglobin 7.3 (L) 11.7 - 15.4 g/dL    Hematocrit 23.7 (L) 35.8 - 46.3 %    MCV 89.1 79.6 - 97.8 FL    MCH 27.4 26.1 - 32.9 PG    MCHC 30.8 (L) 31.4 - 35.0 g/dL    RDW 15.9 (H) 11.9 - 14.6 %    Platelets 206 389 - 722 K/uL    MPV 10.4 9.4 - 12.3 FL    nRBC 0.00 0.0 - 0.2 K/uL    Differential Type AUTOMATED      Seg Neutrophils 81 (H) 43 - 78 %    Lymphocytes 10 (L) 13 - 44 %    Monocytes 8 4.0 - 12.0 %    Eosinophils % 0 (L) 0.5 - 7.8 %    Basophils 0 0.0 - 2.0 %    Immature Granulocytes 1 0.0 - 5.0 %    Segs Absolute 6.5 1.7 - 8.2 K/UL    Absolute Lymph # 0.8 0.5 - 4.6 K/UL    Absolute Mono # 0.6 0.1 - 1.3 K/UL    Absolute Eos # 0.0 0.0 - 0.8 K/UL    Basophils Absolute 0.0 0.0 - 0.2 K/UL    Absolute Immature Granulocyte 0.0 0.0 - 0.5 K/UL   Urinalysis with Reflex to Culture    Specimen: Urine   Result Value Ref Range    Color, UA YELLOW/STRAW      Appearance CLOUDY      Specific [place], UA 1.013 1.001 - 1.023      pH, Urine 5.5 5.0 - 9.0      Protein, UA 30 (A) NEG mg/dL    Glucose, UA Negative mg/dL    Ketones, Urine 40 (A) NEG mg/dL    Bilirubin Urine Negative NEG      Blood, Urine MODERATE (A) NEG      Urobilinogen, Urine 1.0 0.2 - 1.0 EU/dL    Nitrite, Urine Negative NEG      Leukocyte Esterase, Urine TRACE (A) NEG      WBC, UA 10-20 0 /hpf    RBC, UA 0-3 0 /hpf    BACTERIA, URINE 0 0 /hpf    Urine Culture if Indicated URINE CULTURE ORDERED      Epithelial Cells UA 3-5 0 /hpf    Casts 0 0 /lpf    Crystals 0 0 /LPF    Mucus, UA 3+ (H) 0 /lpf    OTHER OBSERVATIONS RESULTS VERIFIED MANUALLY     Ferritin   Result Value Ref Range    Ferritin 100 8 - 388 NG/ML   Transferrin Saturation   Result Value Ref Range    Iron 27 (L) 35 - 150 ug/dL    TIBC 85 (L) 250 - 450 ug/dL    TRANSFERRIN SATURATION 32 >20 %   Calcium, Ionized   Result Value Ref Range    Calcium, Ionized 4.16 4.0 - 5.2 mg/dL   Comprehensive Metabolic Panel w/ Reflex to MG   Result Value Ref Range    Sodium 137 136 - 145 mmol/L    Potassium 3.1 (L) 3.5 - 5.1 mmol/L    Chloride 102 101 - 110 mmol/L    CO2 29 21 - 32 mmol/L    Anion Gap 6 4 - 13 mmol/L    Glucose 87 65 - 100 mg/dL    BUN 3 (L) 6 - 23 MG/DL    Creatinine 0.50 (L) 0.6 - 1.0 MG/DL    Est, Glom Filt Rate >60 >60 ml/min/1.73m2    Calcium 8.2 (L) 8.3 - 10.4 MG/DL    Total Bilirubin 0.4 0.2 - 1.1 MG/DL    ALT 11 (L) 12 - 65 U/L    AST 6 (L) 15 - 37 U/L    Alk Phosphatase 106 50 - 136 U/L    Total Protein 5.3 (L) 6.3 - 8.2 g/dL    Albumin 3.2 (L) 3.5 - 5.0 g/dL    Globulin 2.1 (L) 2.3 - 3.5 g/dL    Albumin/Globulin Ratio 1.5 1.2 - 3.5     Magnesium   Result Value Ref Range    Magnesium 1.5 (L) 1.8 - 2.4 mg/dL   CBC with Auto Differential   Result Value Ref Range    WBC 9.1 4.3 - 11.1 K/uL    RBC 2.62 (L) 4.05 - 5.2 M/uL    Hemoglobin 7.1 (L) 11.7 - 15.4 g/dL    Hematocrit 23.1 (L) 35.8 - 46.3 %    MCV 88.2 79.6 - 97.8 FL    MCH 27.1 26.1 - 32.9 PG    MCHC 30.7 (L) 31.4 - 35.0 g/dL    RDW 15.8 (H) 11.9 - 14.6 %    Platelets 875 337 - 590 K/uL    MPV 10.1 9.4 - 12.3 FL    nRBC 0.00 0.0 - 0.2 K/uL    Differential Type AUTOMATED      Seg Neutrophils 84 (H) 43 - 78 %    Lymphocytes 8 (L) 13 - 44 %    Monocytes 7 4.0 - 12.0 %    Eosinophils % 0 (L) 0.5 - 7.8 %    Basophils 0 0.0 - 2.0 %    Immature Granulocytes 1 0.0 - 5.0 %    Segs Absolute 7.7 1.7 - 8.2 K/UL    Absolute Lymph # 0.7 0.5 - 4.6 K/UL    Absolute Mono # 0.7 0.1 - 1.3 K/UL    Absolute Eos # 0.0 0.0 - 0.8 K/UL    Basophils Absolute 0.0 0.0 - 0.2 K/UL    Absolute Immature Granulocyte 0.1 0.0 - 0.5 K/UL   Body fluid cell count   Result Value Ref Range    Specimen ASCITIC FLUID      Color, Fluid YELLOW      Appearance, Fluid CLEAR      RBC, Fluid 1,000 /cu mm    WBC, Fluid 419 /cu mm    Segmented Neutrophils, BAL 79 %    Lymphocytes, BAL 17 %    Macrophages, BAL 4 %   Lactate Dehydrogenase, Body Fluid   Result Value Ref Range    Fluid Type ASCITIC FLUID      LD, Fluid 291 U/L   Comprehensive Metabolic Panel w/ Reflex to MG   Result Value Ref Range    Sodium 139 136 - 145 mmol/L    Potassium 3.1 (L) 3.5 - 5.1 mmol/L    Chloride 101 101 - 110 mmol/L    CO2 31 21 - 32 mmol/L    Anion Gap 7 4 - 13 mmol/L    Glucose 91 65 - 100 mg/dL    BUN 3 (L) 6 - 23 MG/DL    Creatinine 0.40 (L) 0.6 - 1.0 MG/DL    Est, Glom Filt Rate >60 >60 ml/min/1.73m2    Calcium 8.1 (L) 8.3 - 10.4 MG/DL    Total Bilirubin 0.4 0.2 - 1.1 MG/DL    ALT 10 (L) 12 - 65 U/L    AST 6 (L) 15 - 37 U/L    Alk Phosphatase 97 50 - 136 U/L    Total Protein 5.2 (L) 6.3 - 8.2 g/dL    Albumin 2.8 (L) 3.5 - 5.0 g/dL    Globulin 2.4 2.3 - 3.5 g/dL    Albumin/Globulin Ratio 1.2 1.2 - 3.5     CBC with Auto Differential   Result Value Ref Range    WBC 8.8 4.3 - 11.1 K/uL    RBC 2.78 (L) 4.05 - 5.2 M/uL    Hemoglobin 7.7 (L) 11.7 - 15.4 g/dL    Hematocrit 24.3 (L) 35.8 - 46.3 %    MCV 87.4 79.6 - 97.8 FL    MCH 27.7 26.1 - 32.9 PG    MCHC 31.7 31.4 - 35.0 g/dL    RDW 15.9 (H) 11.9 - 14.6 %    Platelets 461 938 - 183 K/uL    MPV 10.4 9.4 - 12.3 FL    nRBC 0.00 0.0 - 0.2 K/uL    Differential Type AUTOMATED      Seg Neutrophils 81 (H) 43 - 78 %    Lymphocytes 9 (L) 13 - 44 %    Monocytes 9 4.0 - 12.0 %    Eosinophils % 0 (L) 0.5 - 7.8 %    Basophils 0 0.0 - 2.0 %    Immature Granulocytes 1 0.0 - 5.0 %    Segs Absolute 7.2 1.7 - 8.2 K/UL    Absolute Lymph # 0.8 0.5 - 4.6 K/UL    Absolute Mono # 0.8 0.1 - 1.3 K/UL    Absolute Eos # 0.0 0.0 - 0.8 K/UL    Basophils Absolute 0.0 0.0 - 0.2 K/UL    Absolute Immature Granulocyte 0.1 0.0 - 0.5 K/UL   Magnesium   Result Value Ref Range    Magnesium 1.7 (L) 1.8 - 2.4 mg/dL   Inpatient consult to Oncology    Clemencia Bryant, APRN - [facility]     10/4/2022  1:55 PM  See H&P dated 10/4/22   EKG 12 Lead   Result Value Ref Range    Ventricular Rate 74 BPM    Atrial Rate 74 BPM    P-R Interval 115 ms    QRS Duration 70 ms    Q-T Interval 388 ms    QTc Calculation (Bazett) 431 ms    P Axis 13 degrees    R Axis 76 degrees    T Axis 30 degrees    Diagnosis Sinus rhythm    TYPE AND SCREEN   Result Value Ref Range    Crossmatch expiration date 10/09/2022,2350     ABO/Rh A POSITIVE     Antibody Screen NEG         NC XR TECHNOLOGIST SERVICE   Final Result      CT CHEST ABDOMEN PELVIS W CONTRAST   Final Result         1. No pulmonary embolism. 2.  Mild biliary ductal dilatation.       3.  Omental tumor implants, ascites and diffuse bowel wall thickening may be   caused by serosal tumor implants or colitis. 4.  Progression of bilateral hydronephrosis. XR CHEST PORTABLE   Final Result   Low lung volumes. No focal airspace consolidation. Voice dictation software was used during the making of this note. This software is not perfect and grammatical and other typographical errors may be present. This note has not been completely proofread for errors.        Shanna Jaimes MD  10/06/22 1858

## 2022-10-04 ENCOUNTER — PREP FOR PROCEDURE (OUTPATIENT)
Dept: ONCOLOGY | Age: 47
End: 2022-10-04

## 2022-10-04 PROBLEM — K21.9 GERD (GASTROESOPHAGEAL REFLUX DISEASE): Status: ACTIVE | Noted: 2022-10-04

## 2022-10-04 PROBLEM — D64.9 ANEMIA: Status: ACTIVE | Noted: 2020-07-09

## 2022-10-04 PROBLEM — K52.9 COLITIS: Status: ACTIVE | Noted: 2022-10-04

## 2022-10-04 PROBLEM — E87.6 HYPOKALEMIA: Status: ACTIVE | Noted: 2022-10-04

## 2022-10-04 PROBLEM — E83.42 HYPOMAGNESEMIA: Status: ACTIVE | Noted: 2022-10-04

## 2022-10-04 PROBLEM — R11.2 INTRACTABLE VOMITING WITH NAUSEA: Status: ACTIVE | Noted: 2022-10-04

## 2022-10-04 PROBLEM — E88.09 HYPOALBUMINEMIA: Status: ACTIVE | Noted: 2022-10-04

## 2022-10-04 LAB
ALBUMIN SERPL-MCNC: 2.5 G/DL (ref 3.5–5)
ALBUMIN/GLOB SERPL: 1.1 {RATIO} (ref 1.2–3.5)
ALP SERPL-CCNC: 134 U/L (ref 50–136)
ALT SERPL-CCNC: 15 U/L (ref 12–65)
ANION GAP SERPL CALC-SCNC: 8 MMOL/L (ref 4–13)
APPEARANCE UR: ABNORMAL
AST SERPL-CCNC: 9 U/L (ref 15–37)
BACTERIA URNS QL MICRO: 0 /HPF
BASOPHILS # BLD: 0 K/UL (ref 0–0.2)
BASOPHILS NFR BLD: 0 % (ref 0–2)
BILIRUB SERPL-MCNC: 0.4 MG/DL (ref 0.2–1.1)
BILIRUB UR QL: NEGATIVE
BUN SERPL-MCNC: 6 MG/DL (ref 6–23)
CA-I BLD-MCNC: 4.16 MG/DL (ref 4–5.2)
CALCIUM SERPL-MCNC: 7.8 MG/DL (ref 8.3–10.4)
CASTS URNS QL MICRO: 0 /LPF
CHLORIDE SERPL-SCNC: 100 MMOL/L (ref 101–110)
CO2 SERPL-SCNC: 28 MMOL/L (ref 21–32)
COLOR UR: ABNORMAL
CREAT SERPL-MCNC: 0.5 MG/DL (ref 0.6–1)
CRYSTALS URNS QL MICRO: 0 /LPF
DIFFERENTIAL METHOD BLD: ABNORMAL
EKG ATRIAL RATE: 74 BPM
EKG DIAGNOSIS: NORMAL
EKG P AXIS: 13 DEGREES
EKG P-R INTERVAL: 115 MS
EKG Q-T INTERVAL: 388 MS
EKG QRS DURATION: 70 MS
EKG QTC CALCULATION (BAZETT): 431 MS
EKG R AXIS: 76 DEGREES
EKG T AXIS: 30 DEGREES
EKG VENTRICULAR RATE: 74 BPM
EOSINOPHIL # BLD: 0 K/UL (ref 0–0.8)
EOSINOPHIL NFR BLD: 0 % (ref 0.5–7.8)
EPI CELLS #/AREA URNS HPF: ABNORMAL /HPF
ERYTHROCYTE [DISTWIDTH] IN BLOOD BY AUTOMATED COUNT: 15.9 % (ref 11.9–14.6)
FERRITIN SERPL-MCNC: 100 NG/ML (ref 8–388)
GLOBULIN SER CALC-MCNC: 2.2 G/DL (ref 2.3–3.5)
GLUCOSE SERPL-MCNC: 75 MG/DL (ref 65–100)
GLUCOSE UR STRIP.AUTO-MCNC: NEGATIVE MG/DL
HCT VFR BLD AUTO: 23.7 % (ref 35.8–46.3)
HGB BLD-MCNC: 7.3 G/DL (ref 11.7–15.4)
HGB UR QL STRIP: ABNORMAL
IMM GRANULOCYTES # BLD AUTO: 0 K/UL (ref 0–0.5)
IMM GRANULOCYTES NFR BLD AUTO: 1 % (ref 0–5)
IRON SATN MFR SERPL: 32 %
IRON SERPL-MCNC: 27 UG/DL (ref 35–150)
KETONES UR QL STRIP.AUTO: 40 MG/DL
LEUKOCYTE ESTERASE UR QL STRIP.AUTO: ABNORMAL
LYMPHOCYTES # BLD: 0.8 K/UL (ref 0.5–4.6)
LYMPHOCYTES NFR BLD: 10 % (ref 13–44)
MAGNESIUM SERPL-MCNC: 1.8 MG/DL (ref 1.8–2.4)
MCH RBC QN AUTO: 27.4 PG (ref 26.1–32.9)
MCHC RBC AUTO-ENTMCNC: 30.8 G/DL (ref 31.4–35)
MCV RBC AUTO: 89.1 FL (ref 79.6–97.8)
MONOCYTES # BLD: 0.6 K/UL (ref 0.1–1.3)
MONOCYTES NFR BLD: 8 % (ref 4–12)
MUCOUS THREADS URNS QL MICRO: ABNORMAL /LPF
NEUTS SEG # BLD: 6.5 K/UL (ref 1.7–8.2)
NEUTS SEG NFR BLD: 81 % (ref 43–78)
NITRITE UR QL STRIP.AUTO: NEGATIVE
NRBC # BLD: 0 K/UL (ref 0–0.2)
OTHER OBSERVATIONS: ABNORMAL
PH UR STRIP: 5.5 [PH] (ref 5–9)
PLATELET # BLD AUTO: 227 K/UL (ref 150–450)
PMV BLD AUTO: 10.4 FL (ref 9.4–12.3)
POTASSIUM SERPL-SCNC: 4 MMOL/L (ref 3.5–5.1)
PROT SERPL-MCNC: 4.7 G/DL (ref 6.3–8.2)
PROT UR STRIP-MCNC: 30 MG/DL
RBC # BLD AUTO: 2.66 M/UL (ref 4.05–5.2)
RBC #/AREA URNS HPF: ABNORMAL /HPF
SODIUM SERPL-SCNC: 136 MMOL/L (ref 136–145)
SP GR UR REFRACTOMETRY: 1.01 (ref 1–1.02)
TIBC SERPL-MCNC: 85 UG/DL (ref 250–450)
URINE CULTURE IF INDICATED: ABNORMAL
UROBILINOGEN UR QL STRIP.AUTO: 1 EU/DL (ref 0.2–1)
WBC # BLD AUTO: 8 K/UL (ref 4.3–11.1)
WBC URNS QL MICRO: ABNORMAL /HPF

## 2022-10-04 PROCEDURE — 83735 ASSAY OF MAGNESIUM: CPT

## 2022-10-04 PROCEDURE — P9047 ALBUMIN (HUMAN), 25%, 50ML: HCPCS | Performed by: FAMILY MEDICINE

## 2022-10-04 PROCEDURE — 99223 1ST HOSP IP/OBS HIGH 75: CPT | Performed by: INTERNAL MEDICINE

## 2022-10-04 PROCEDURE — 2580000003 HC RX 258: Performed by: FAMILY MEDICINE

## 2022-10-04 PROCEDURE — 83540 ASSAY OF IRON: CPT

## 2022-10-04 PROCEDURE — 6360000002 HC RX W HCPCS: Performed by: NURSE PRACTITIONER

## 2022-10-04 PROCEDURE — 6360000002 HC RX W HCPCS: Performed by: FAMILY MEDICINE

## 2022-10-04 PROCEDURE — 82330 ASSAY OF CALCIUM: CPT

## 2022-10-04 PROCEDURE — APPSS60 APP SPLIT SHARED TIME 46-60 MINUTES: Performed by: NURSE PRACTITIONER

## 2022-10-04 PROCEDURE — 80053 COMPREHEN METABOLIC PANEL: CPT

## 2022-10-04 PROCEDURE — 1100000000 HC RM PRIVATE

## 2022-10-04 PROCEDURE — 85025 COMPLETE CBC W/AUTO DIFF WBC: CPT

## 2022-10-04 PROCEDURE — 6370000000 HC RX 637 (ALT 250 FOR IP): Performed by: FAMILY MEDICINE

## 2022-10-04 PROCEDURE — 82728 ASSAY OF FERRITIN: CPT

## 2022-10-04 RX ORDER — PROCHLORPERAZINE EDISYLATE 5 MG/ML
10 INJECTION INTRAMUSCULAR; INTRAVENOUS EVERY 6 HOURS PRN
Status: DISCONTINUED | OUTPATIENT
Start: 2022-10-04 | End: 2022-10-08 | Stop reason: HOSPADM

## 2022-10-04 RX ORDER — LEVOFLOXACIN 5 MG/ML
750 INJECTION, SOLUTION INTRAVENOUS EVERY 24 HOURS
Status: DISCONTINUED | OUTPATIENT
Start: 2022-10-04 | End: 2022-10-06

## 2022-10-04 RX ADMIN — ALBUMIN (HUMAN) 25 G: 0.25 INJECTION, SOLUTION INTRAVENOUS at 10:56

## 2022-10-04 RX ADMIN — METRONIDAZOLE 500 MG: 500 TABLET ORAL at 05:32

## 2022-10-04 RX ADMIN — MORPHINE SULFATE 2 MG: 2 INJECTION, SOLUTION INTRAMUSCULAR; INTRAVENOUS at 11:11

## 2022-10-04 RX ADMIN — Medication 250 MG: at 10:53

## 2022-10-04 RX ADMIN — MORPHINE SULFATE 2 MG: 2 INJECTION, SOLUTION INTRAMUSCULAR; INTRAVENOUS at 06:11

## 2022-10-04 RX ADMIN — POTASSIUM CHLORIDE 10 MEQ: 7.46 INJECTION, SOLUTION INTRAVENOUS at 02:22

## 2022-10-04 RX ADMIN — POTASSIUM CHLORIDE 10 MEQ: 7.46 INJECTION, SOLUTION INTRAVENOUS at 05:13

## 2022-10-04 RX ADMIN — SODIUM CHLORIDE, PRESERVATIVE FREE 10 ML: 5 INJECTION INTRAVENOUS at 21:18

## 2022-10-04 RX ADMIN — LEVOFLOXACIN 750 MG: 5 INJECTION, SOLUTION INTRAVENOUS at 13:49

## 2022-10-04 RX ADMIN — ONDANSETRON 4 MG: 2 INJECTION INTRAMUSCULAR; INTRAVENOUS at 06:09

## 2022-10-04 RX ADMIN — POTASSIUM CHLORIDE 10 MEQ: 7.46 INJECTION, SOLUTION INTRAVENOUS at 03:53

## 2022-10-04 RX ADMIN — METOCLOPRAMIDE 5 MG: 10 TABLET ORAL at 10:54

## 2022-10-04 RX ADMIN — VALACYCLOVIR HYDROCHLORIDE 500 MG: 500 TABLET, FILM COATED ORAL at 10:54

## 2022-10-04 RX ADMIN — ALBUMIN (HUMAN) 25 G: 0.25 INJECTION, SOLUTION INTRAVENOUS at 05:28

## 2022-10-04 RX ADMIN — MORPHINE SULFATE 2 MG: 2 INJECTION, SOLUTION INTRAMUSCULAR; INTRAVENOUS at 16:33

## 2022-10-04 RX ADMIN — PANTOPRAZOLE SODIUM 40 MG: 40 TABLET, DELAYED RELEASE ORAL at 10:55

## 2022-10-04 RX ADMIN — SUCRALFATE 1 G: 1 TABLET ORAL at 10:53

## 2022-10-04 RX ADMIN — METRONIDAZOLE 500 MG: 500 TABLET ORAL at 13:49

## 2022-10-04 RX ADMIN — ALBUMIN (HUMAN) 25 G: 0.25 INJECTION, SOLUTION INTRAVENOUS at 16:38

## 2022-10-04 RX ADMIN — PROCHLORPERAZINE EDISYLATE 10 MG: 5 INJECTION INTRAMUSCULAR; INTRAVENOUS at 21:14

## 2022-10-04 RX ADMIN — POTASSIUM CHLORIDE 10 MEQ: 7.46 INJECTION, SOLUTION INTRAVENOUS at 00:57

## 2022-10-04 RX ADMIN — DOCUSATE SODIUM 100 MG: 100 CAPSULE, LIQUID FILLED ORAL at 10:55

## 2022-10-04 RX ADMIN — SUCRALFATE 1 G: 1 TABLET ORAL at 18:09

## 2022-10-04 RX ADMIN — PANTOPRAZOLE SODIUM 40 MG: 40 TABLET, DELAYED RELEASE ORAL at 21:15

## 2022-10-04 RX ADMIN — APIXABAN 5 MG: 5 TABLET, FILM COATED ORAL at 10:55

## 2022-10-04 RX ADMIN — SODIUM CHLORIDE, PRESERVATIVE FREE 10 ML: 5 INJECTION INTRAVENOUS at 13:50

## 2022-10-04 RX ADMIN — APIXABAN 5 MG: 5 TABLET, FILM COATED ORAL at 21:15

## 2022-10-04 RX ADMIN — ALBUMIN (HUMAN) 25 G: 0.25 INJECTION, SOLUTION INTRAVENOUS at 00:38

## 2022-10-04 RX ADMIN — MORPHINE SULFATE 2 MG: 2 INJECTION, SOLUTION INTRAMUSCULAR; INTRAVENOUS at 00:38

## 2022-10-04 RX ADMIN — PROCHLORPERAZINE EDISYLATE 10 MG: 5 INJECTION INTRAMUSCULAR; INTRAVENOUS at 12:02

## 2022-10-04 ASSESSMENT — PAIN DESCRIPTION - DESCRIPTORS
DESCRIPTORS: ACHING
DESCRIPTORS: ACHING

## 2022-10-04 ASSESSMENT — PAIN DESCRIPTION - LOCATION
LOCATION: ABDOMEN
LOCATION: ABDOMEN

## 2022-10-04 ASSESSMENT — PAIN SCALES - GENERAL
PAINLEVEL_OUTOF10: 8
PAINLEVEL_OUTOF10: 0
PAINLEVEL_OUTOF10: 8
PAINLEVEL_OUTOF10: 8
PAINLEVEL_OUTOF10: 0
PAINLEVEL_OUTOF10: 7

## 2022-10-04 ASSESSMENT — PAIN DESCRIPTION - ORIENTATION: ORIENTATION: MID

## 2022-10-04 ASSESSMENT — PAIN DESCRIPTION - PAIN TYPE: TYPE: CHRONIC PAIN

## 2022-10-04 NOTE — CARE COORDINATION
Case Management Assessment  Initial Evaluation    Date/Time of Evaluation: 10/4/2022 4:04 PM  Assessment Completed by: Christiano Enciso RN    If patient is discharged prior to next notation, then this note serves as note for discharge by case management. Patient Name: Te Candelario                   YOB: 1975  Diagnosis: Hypokalemia [E87.6]  Abdominal pain [R10.9]  Intractable vomiting with nausea [R11.2]                   Date / Time: 10/3/2022  3:36 PM    Patient Admission Status: Inpatient   Readmission Risk (Low < 19, Mod (19-27), High > 27): Readmission Risk Score: 29.3    Current PCP: None Provider  PCP verified by CM? Yes    Chart Reviewed: Yes      History Provided by:    Patient Orientation: Alert and Oriented    Patient Cognition: Alert    Hospitalization in the last 30 days (Readmission):  Yes    If yes, Readmission Assessment in CM Navigator will be completed. Advance Directives:      Code Status: Full Code   Patient's Primary Decision Maker is: Legal Next of Kin    [facility] [phone]    Discharge Planning:    Patient lives with: Family Members Type of Home: House  Primary Care Giver: Family  Patient Support Systems include: Family Members   Current Financial resources:    Current community resources:    Current services prior to admission: None            Current DME:              Type of Home Care services:  None    ADLS  Prior functional level:    Current functional level:      PT AM-PAC:   /24  OT AM-PAC:   /24    Family can provide assistance at DC: Yes  Would you like Case Management to discuss the discharge plan with any other family members/significant others, and if so, who?     Plans to Return to Present Housing: Unknown at present  Other Identified Issues/Barriers to RETURNING to current housing: undetermined  Potential Assistance needed at discharge: N/A            Potential DME:    Patient expects to discharge to: Unknown  Plan for transportation at discharge: Family    Financial    Payor: Tam Johns / Plan: Abigail Olmedo / Product Type: *No Product type* /     Does insurance require precert for SNF: No    Potential assistance Purchasing Medications:    Meds-to-Beds request:        [id number] [address] [place] [phone]  [address]  [place] [address]  Phone: [phone] Fax: [phone]      Notes:    Factors facilitating achievement of predicted outcomes: Family support, Friend support, and Pleasant    Barriers to discharge: Pain, Limited family support, and Long standing deficits    Additional Case Management Notes: patient was discharged home with [facility] previous admission  with Pluerx drain    The Plan for Transition of Care is related to the following treatment goals of Hypokalemia [E87.6]  Abdominal pain [R10.9]  Intractable vomiting with nausea [O46.8]    IF APPLICABLE: The Patient and/or patient representative Yovani Mendoza and her family were provided with a choice of provider and agrees with the discharge plan. Freedom of choice list with basic dialogue that supports the patient's individualized plan of care/goals and shares the quality data associated with the providers was provided to:     Patient Representative Name:       The Patient and/or Patient Representative Agree with the Discharge Plan?       Christiano Enciso RN  Case Management Department

## 2022-10-04 NOTE — H&P
Hospitalist History and Physical   Admit Date:  10/3/2022  8:22 PM   Name:  Leonid Costa   Age:  52 y.o. Sex:  female  :  1975   MRN:  [id number]   Room:  ER/    Presenting Complaint: Chest Pain     Reason(s) for Admission: No admission diagnoses are documented for this encounter. History of Present Illness:   Leonid Costa is a 52 y.o. female with medical history of  PE on Eliquis and metastatic carcinoma of unknown primary followed by Dr. Lieutenant Diaz currently on chemo presented with acute on chronic generalized abdominal pain associated with nausea/vomiting with 1 episode of watery diarrhea started on 10/2. She has a Pleurx drain in place but does not have the supplies in order to drain the ascites. Of note, patient was diagnosed with peritoneal carcinomatosis on 2022. She had ex lap on 2022 to eval for debulking but tumor was found to be tightly adherent and impossible to debulk. She was recently admitted -2022 for hematemesis with anemia. EGD was negative. Oncology was consulted and took over care. CTAP shows increased in ascites and patient underwent paracentesis with malignant cytology. Right Pleurx drain was placed on 2022. She also has hx of b/l hydronephrosis with ureteral stent placement 2022. Urology saw on 22 and recommended no need for intervention then with renal function wnl; however Dr. Libra Diamond may want to upsize at next exchange. She has chemo planned for 10/6/22. In ED, VSS. Hgb 9.1 (improved from 8.7 on ). K 2.7. CT chest/abdomenpelvis on admission shows no PE; mild biliary ductal dilation; omental tumor implants, ascites and diffuse bowel wall thickening may be caused by serosal tumor implants or colitis; progression of bilateral hydronephrosis. She was given 100 mL bolus, morphine, Zofran, KCl IV in ED. Review of Systems:  10 systems reviewed and negative except as noted in HPI.   Assessment & Plan:     Generalized Abdominal Pain  Large volume ascites  Abdominal pain multifactorial in setting of peritoneal carcinomatosis, ascites, possible SBP and colitis. CT chest/abdomenpelvis on admission shows no PE; mild biliary ductal dilation; omental tumor implants, ascites and diffuse bowel wall thickening may be caused by serosal tumor implants or colitis; progression of bilateral hydronephrosis  Cont Reglan qd  Cont paracentesis with PleurX  Add Probiotics  Abx: Rocephin/Flagyl (10/3-. ..) empirically for colitis   BCX: pending  Antiemetics and analgesics prn  Cont bowel regimen   Peritoneal fluid already discarded in ED, hold off on ascitic studies for now  Hold off on stool cultures/Cdiff unless diarrhea recurrent  Obtain UA    Hypokalemia  Hypomagnesemia  2/2 GI loss  Replace and recheck    Hypocalcemia  2/2 hypoalbuminemia. Ca 7.8 on admission corrected to 9.6 with albumin. Check ionized calcium in AM    Hypoalbuminemia  S/p paracentesis in ED  Replace IV and recheck  Add oral supplements  Consult dietician to assist    Peritoneal carcinomatosis  Metastatic carcinoma of unknown primary  Diagnosed 2/2022. Patient of Dr. Angel Vann, currently on chemotherapy outpatient, next session scheduled 10/6/22. Previously had ex-lap in 8/2022 to eval for debulking but tumor was found to be tightly adherent and impossible to debulk. PleurX drain placed 9/26/22  Consult Oncology in AM, appreciate recs     B/l hydronephrosis  S/p ureteral stent placement 2/2022. CTAP on admission shows progression of bilateral hydronephrosis  Urology saw on 9/21/22--no need for intervention then with renal function wnl; however Dr. Margarita Carney may want to upsize at next exchange  Monitor I&O's and for signs/symptoms of urinary retention  Check UA  F/u Urology outpatient, last saw 7/2022.  Next stent exchange planned in Nov 2022  If developing flank pain, consider Urology consult     Long term [facility]  CT Chest 6/22/2022 shows nonobstructive pulmonary filling defect involving subsegmental vessels of LLL. Patient was started on Xarelto and subsequently transitioned to Eliquis outpatient for PE prophylaxis. S/p EGD on 22 that was negative for active bleeding. Cont Eliquis    GERD  Cont PPI and carafate    Normocytic anemia  Hgb 9.1 on admission (baseline ~7-8). Previous iron studies/vitamin B12, folate on 22 unremarkable. Hx of GIB  Recheck iron studies in AM  CTM for now, transfuse if Hgb <7 or if symptomatic           Anticipated discharge needs:     >2 midnights. Onc to assume care in AM?    Diet: No diet orders on file  VTE ppx: Eliquis  Code status: Prior    Hospital Problems:  Principal Problem:    Abdominal pain  Resolved Problems:    * No resolved hospital problems.  *       Past History:     Past Medical History:   Diagnosis Date    Anemia     -- not a problem since hyst    Colon cancer ([facility] [address] dx 2022     plans for chemo--- followed by dr Tootie Bernal    COVID-19 2020    no hospitalization    GERD (gastroesophageal reflux disease)     managed with med    History of colonoscopy 2018    Dr. Ann Marie Cuadra, nl (see media note), R     Hx of blood clots 2022    per pt \"small clot on lung identified by CT scan\"  CT Scan impression:- Nonobstructive pulmonary filling defect involving Left Lower Lobe     Hypotension     asymptomatic    Obstruction of fallopian tube     per pt has \"1 good tube\"    Peritoneal carcinomatosis ([facility] [address]     Weight loss     80lbs weight loss after gastric sleeve       Past Surgical History:   Procedure Laterality Date     SECTION  2009    CYSTOSCOPY Bilateral 2022    CYSTOSCOPY BILATERAL RETROGRADE PYELOGRAM performed by Evaristo Glass MD at [address] Bilateral 2022    BILATERAL CYSTOSCOPY URETERAL STENT EXCHANGE performed by Evaristo Glass MD at [address]  2014    gastric sleeve- Choudhari    HYSTERECTOMY (CERVIX STATUS UNKNOWN)      2018    HYSTERECTOMY (CERVIX STATUS UNKNOWN)  07/09/2020    TLH w/ Bilateral salpingectomy and left oophorectomy    IR PERC CATH PLEURAL DRAIN W/IMAG  9/26/2022    IR PERC CATH PLEURAL DRAIN W/IMAG 9/26/2022 [facility]    IR PORT PLACEMENT EQUAL OR GREATER THAN 5 YEARS  2/28/2022    IR PORT PLACEMENT EQUAL OR GREATER THAN 5 YEARS  2/28/2022    IR PORT PLACEMENT EQUAL OR GREATER THAN 5 YEARS 2/28/2022 [facility]    LAPAROTOMY N/A 8/17/2022    EXPLORATORY LAPAROTOMY/ ENTEROENTEROSTOMY performed by Zachary Zuniga MD at [facility]. [address]  age Cameron Murders 21s\"    also \"unblocked her FT\"    TONSILLECTOMY      UPPER GASTROINTESTINAL ENDOSCOPY      with dilation    UPPER GASTROINTESTINAL ENDOSCOPY N/A 9/13/2022    EGD ESOPHAGOGASTRODUODENOSCOPY OFL 17 To IR after recovery for Para performed by Chrissy Ortiz MD at [facility] ENDOSCOPY    UROLOGICAL SURGERY Bilateral 03/15/2022    cysto        Social History     Tobacco Use    Smoking status: Never    Smokeless tobacco: Never   Substance Use Topics    Alcohol use: Not Currently      Social History     Substance and Sexual Activity   Drug Use No       Family History   Problem Relation Age of Onset    Heart Disease Maternal Grandmother     Hypertension Maternal Grandmother     Heart Disease Maternal Grandfather     Hypertension Maternal Grandfather     Pulmonary Embolism Neg Hx     Colon Cancer Neg Hx     Deep Vein Thrombosis Neg Hx     Ovarian Cancer Neg Hx     Prostate Cancer Neg Hx     Breast Cancer Neg Hx         Immunization History   Administered Date(s) Administered    COVID-19, PFIZER PURPLE top, DILUTE for use, (age 15 y+), 30mcg/0.3mL 08/08/2021, 08/29/2021     No Known Allergies  Prior to Admit Medications:  Current Outpatient Medications   Medication Instructions    apixaban (ELIQUIS) 5 mg, Oral, 2 TIMES DAILY    docusate (COLACE, DULCOLAX) 100 mg, Oral, 2 TIMES DAILY    ergocalciferol (ERGOCALCIFEROL) 50,000 Units, EVERY 7 DAYS    lidocaine-prilocaine (EMLA) 2.5-2.5 % cream Apply to port about 45 minutes prior to access    magnesium oxide (MAG-OX) 400 mg, Oral, 2 TIMES DAILY    metoclopramide (REGLAN) 5 mg, Oral, DAILY    ondansetron (ZOFRAN) 8 mg, Oral, EVERY 8 HOURS PRN    oxyCODONE (ROXICODONE) 5 mg, Oral, EVERY 6 HOURS PRN, Intended supply: 3 days. Take lowest dose possible to manage pain    pantoprazole (PROTONIX) 40 mg, Oral, 2 times daily    polyethylene glycol (GLYCOLAX) 17 g, Oral, DAILY PRN    potassium chloride (KLOR-CON M) 20 MEQ extended release tablet 20 mEq, Oral, 2 TIMES DAILY    sennosides-docusate sodium (SENOKOT-S) 8.6-50 MG tablet 2 tablets, Oral, DAILY PRN    sucralfate (CARAFATE) 1 g, Oral, 4 TIMES DAILY    valACYclovir (VALTREX) 500 mg, Oral, DAILY         Objective:   Patient Vitals for the past 24 hrs:   Temp Pulse Resp BP SpO2   10/03/22 1833 98.4 °F (36.9 °C) 71 20 114/80 97 %   10/03/22 1700 -- 76 15 131/87 97 %   10/03/22 1648 -- 84 14 128/88 97 %   10/03/22 1640 -- -- 16 -- --   10/03/22 1615 -- 74 19 128/85 97 %   10/03/22 1600 -- 74 19 124/85 --   10/03/22 1545 -- 75 16 129/88 --   10/03/22 1542 99 °F (37.2 °C) 76 20 135/86 98 %       Oxygen Therapy  SpO2: 97 %  O2 Device: None (Room air)    Estimated body mass index is 19.22 kg/m² as calculated from the following:    Height as of this encounter: 5' 4\" (1.626 m). Weight as of this encounter: 112 lb (50.8 kg). Intake/Output Summary (Last 24 hours) at 10/3/2022 2010  Last data filed at 10/3/2022 1645  Gross per 24 hour   Intake --   Output 550 ml   Net -550 ml         Physical Exam:    Blood pressure 114/80, pulse 71, temperature 98.4 °F (36.9 °C), temperature source Oral, resp. rate 20, height 5' 4\" (1.626 m), weight 112 lb (50.8 kg), SpO2 97 %, not currently breastfeeding. General:    Chronically ill and frail appearing   Head:  Normocephalic, atraumatic  Eyes:  Sclerae appear normal.  Pupils equally round. ENT:  Nares appear normal, no drainage. Moist oral mucosa  Neck:  No restricted ROM.   Trachea midline   CV:   RRR. No m/r/g. No jugular venous distension. Lungs:   CTAB. No wheezing, rhonchi, or rales. Symmetric expansion. Abdomen: Bowel sounds present. Soft, mildly distended. Mild TTP diffusely w/o rebound or guarding. PleurX drain in place, dressing c/d/I. Extremities: No cyanosis or clubbing. No edema  Skin:     No rashes and normal coloration. Warm and dry. Neuro:  CN II-XII grossly intact. Sensation intact. A&Ox3  Psych:  Normal mood and affect.       I have personally reviewed labs and tests showing:  Recent Labs:  Recent Results (from the past 24 hour(s))   Lactic Acid    Collection Time: 10/03/22  3:54 PM   Result Value Ref Range    Lactic Acid, Plasma 0.6 0.4 - 2.0 MMOL/L   CBC with Auto Differential    Collection Time: 10/03/22  3:54 PM   Result Value Ref Range    WBC 9.8 4.3 - 11.1 K/uL    RBC 3.25 (L) 4.05 - 5.2 M/uL    Hemoglobin 9.1 (L) 11.7 - 15.4 g/dL    Hematocrit 28.8 (L) 35.8 - 46.3 %    MCV 88.6 79.6 - 97.8 FL    MCH 28.0 26.1 - 32.9 PG    MCHC 31.6 31.4 - 35.0 g/dL    RDW 15.9 (H) 11.9 - 14.6 %    Platelets 604 068 - 737 K/uL    MPV 10.2 9.4 - 12.3 FL    nRBC 0.00 0.0 - 0.2 K/uL    Differential Type AUTOMATED      Seg Neutrophils 82 (H) 43 - 78 %    Lymphocytes 10 (L) 13 - 44 %    Monocytes 7 4.0 - 12.0 %    Eosinophils % 0 (L) 0.5 - 7.8 %    Basophils 0 0.0 - 2.0 %    Immature Granulocytes 1 0.0 - 5.0 %    Segs Absolute 8.1 1.7 - 8.2 K/UL    Absolute Lymph # 1.0 0.5 - 4.6 K/UL    Absolute Mono # 0.7 0.1 - 1.3 K/UL    Absolute Eos # 0.0 0.0 - 0.8 K/UL    Basophils Absolute 0.0 0.0 - 0.2 K/UL    Absolute Immature Granulocyte 0.1 0.0 - 0.5 K/UL   CMP    Collection Time: 10/03/22  3:54 PM   Result Value Ref Range    Sodium 136 136 - 145 mmol/L    Potassium 2.7 (L) 3.5 - 5.1 mmol/L    Chloride 100 (L) 101 - 110 mmol/L    CO2 26 21 - 32 mmol/L    Anion Gap 10 4 - 13 mmol/L    Glucose 97 65 - 100 mg/dL    BUN 7 6 - 23 MG/DL    Creatinine 0.50 (L) 0.6 - 1.0 MG/DL    Est, Glom Filt Rate >60 >60 ml/min/1.73m2    Calcium 7.8 (L) 8.3 - 10.4 MG/DL    Total Bilirubin 0.4 0.2 - 1.1 MG/DL    ALT 18 12 - 65 U/L    AST 17 15 - 37 U/L    Alk Phosphatase 205 (H) 50 - 136 U/L    Total Protein 5.2 (L) 6.3 - 8.2 g/dL    Albumin 1.7 (L) 3.5 - 5.0 g/dL    Globulin 3.5 2.3 - 3.5 g/dL    Albumin/Globulin Ratio 0.5 (L) 1.2 - 3.5     Procalcitonin    Collection Time: 10/03/22  3:54 PM   Result Value Ref Range    Procalcitonin <0.05 0.00 - 0.49 ng/mL       I have personally reviewed imaging studies showing:  XR CHEST PORTABLE    Result Date: 10/3/2022  EXAMINATION: XR CHEST PORTABLE 10/3/2022 4:04 PM ACCESSION NUMBER: [id number] COMPARISON: Chest radiograph dated 8/17/2022 INDICATION: Sepsis TECHNIQUE: A single view of the chest was obtained. FINDINGS: Support Devices: *  Right IJ port catheter tip overlies the right atrium. Cardiac Silhouette: Within normal limits in size. Mediastinum: Normal mediastinal contours. Lungs: Low lung volumes. No airspace consolidation. No pneumothorax or sizable pleural effusion. Upper Abdomen: Normal Miscellaneous: No fracture or suspicious osseous lesion. Low lung volumes. No focal airspace consolidation. CT CHEST ABDOMEN PELVIS W CONTRAST    Result Date: 10/3/2022  CT CHEST ABDOMEN AND PELVIS WITH CONTRAST  10/3/2022 5:15 PM HISTORY:  PE; hx of PE; left chest pain; diffuse abdominal pain. Peritoneal carcinomatosis. TECHNIQUE: The patient received 100 mL Isovue-370 nonionic IV contrast and axial images were obtained through the chest, abdomen, and pelvis. Multiplanar reformatted images were generated. All CT scans at this facility used dose modulation, interactive reconstruction and/or weight based dosing when appropriate to reduce radiation dose to as low as reasonably achievable. COMPARISON:  June 22, 2022 CHEST: There are no filling defects within the main or proximal segmental pulmonary artery suggestive of emboli.  The thoracic aorta is well-opacified without dissection. There is no thoracic adenopathy. A right-sided chest port is present with catheter tip in right atrium. Bilateral pleural effusions are present. There is lower lobe dependent atelectasis. A few groundglass opacities in the right upper lobe may be areas of active alveolitis (image 48). ABDOMEN: A percutaneous catheter or drain is present in the right upper quadrant. A surgical staple line along the outer margin of the stomach is compatible with a sleeve gastrectomy. Gallbladder: Mucosal enhancement. Pericholecystic fluid. No visible stones. Liver: Mild intrahepatic biliary ductal dilatation. No focal hepatic lesions. Pancreas: Normal. Spleen: Normal. Adrenal glands: Normal. Kidneys: Bilateral ureteral stents are present with proximal loops in the distended renal pelvis and distal loops in the bladder. Bilateral significant hydronephrosis has progressed since June 2022. Bowel: The appendix is mildly distended. There is diffuse bowel wall thickening involving the colon and there are several dilated loops small bowel. This appearance may be caused by serosal tumor implants or colitis. Small volume ascites is present and there is diffuse heterogeneous enhancement of the omental fat (image 28) compatible with metastatic omental implants. Retroperitoneum:No adenopathy. Abdominal aorta is normal in caliber. PELVIS:Ascites. Diffuse bladder wall thickening. Hysterectomy. Bones: There are no aggressive osseous lesions. 1. No pulmonary embolism. 2.  Mild biliary ductal dilatation. 3.  Omental tumor implants, ascites and diffuse bowel wall thickening may be caused by serosal tumor implants or colitis. 4.  Progression of bilateral hydronephrosis. Echocardiogram:  No results found for this or any previous visit.         Orders Placed This Encounter   Medications    ondansetron (ZOFRAN) injection 4 mg    morphine (PF) injection 2 mg    sodium chloride flush 0.9 % injection 10 mL    0.9 % sodium chloride bolus    iopamidol (ISOVUE-370) 76 % injection 100 mL    potassium chloride 10 mEq/100 mL IVPB (Peripheral Line)    ondansetron (ZOFRAN) injection 4 mg         Signed: Arslan Birch DO    Part of this note may have been written by using a voice dictation software. The note has been proof read but may still contain some grammatical/other typographical errors.

## 2022-10-04 NOTE — PROGRESS NOTES
Pt re-admitted with abdominal pain and concern for colitis. She has h/o metastatic carcinoma of uncertain primary with peritoneal carcinomatosis. She is supposed to start paclitaxel and ramicirumab later this week, but that may need to be delayed secondary to colitis episode. She has bilateral stents in place that should be changed within the next month or so. Her creat today 0.5; urine cx from yesterday negative thus far. If chemo is delayed, would like to exchange stents next Thursday or even this Thursday if pt is doing well and stable. If not, then we can work [facility] chemo schedule and find a time to exchange them. Please call me with thoughts when plan is established.   Raul Ortiz ([phone])

## 2022-10-04 NOTE — ED NOTES
TRANSFER - OUT REPORT:    Verbal report given to Yesenia on Cristiana Bound  being transferred to [phone] for routine progression of patient care       Report consisted of patient's Situation, Background, Assessment and   Recommendations(SBAR). Information from the following report(s) ED Encounter Summary was reviewed with the receiving nurse. Lines:   Single Lumen Implantable Port Right Subclavian (Active)   Port A Cath Status Accessed 10/03/22 1614   Criteria for Appropriate Use Hemodynamically unstable, requiring monitoring lines, vasopressors, or volume resuscitation 10/03/22 1614   Site Assessment Clean, dry & intact 10/03/22 1614   Line Care Chlorhexidine wipes; Connections checked and tightened 10/03/22 1614   Dressing Type Transparent 10/03/22 1614   Dressing Status Clean, dry & intact; New dressing applied 10/03/22 1614   Dressing Intervention New 10/03/22 1614   Date Accessed  10/03/22 10/03/22 1614   Access Attempts  1 10/03/22 1614   Access Needle Gauge 20 G 10/03/22 1614   Access Needle Length 0.75 inches 10/03/22 1614   Accessed By: Asher Bettencourt RN 10/03/22 1614   Single Lumen Status Blood return noted; Flushed;Lab draw;Normal saline locked 10/03/22 1614       Peripheral IV 10/03/22 Left Antecubital (Active)        Opportunity for questions and clarification was provided.       Patient transported with:  Lian Szymanski RN  10/03/22 7176

## 2022-10-04 NOTE — H&P
[facility] Hematology & Oncology        Inpatient Hematology / Oncology History and Physical    Reason for Admission:  Hypokalemia [E87.6]  Abdominal pain [R10.9]  Intractable vomiting with nausea [R11.2]    History of Present Illness:  Ms. [employer] is a 52 y.o. female admitted on 10/3/2022. The primary encounter diagnosis was Intractable vomiting with nausea. A diagnosis of Hypokalemia was also pertinent to this visit. Ms [employer] has a PMH of hydronephrosis s/p ureteral stents. She is a patient of Dr Elijah Bennett and being treated for metastatic carcinoma of possible GI primary with known peritoneal carcinomatosis on diagnosis. She recently completed C9 FOLFOX/Avastin 9/8/22. Of note, Dr Andi Mejias attempted debulking surgery in 8/2022 for peritoneal disease but due to adhesions he was unable to debulk. She was recently admitted [address] 9/12-9/27/22 and had negative EGD but did have abdominal pleurx drain placed 9/26/22 due to recurrent ascites. She saw Dr Elijah Bennett in clinic on 9/29/22 and due to lack of symptomatic response and progressive ascites, she was felt to have disease progression and therefore recommended changing to paclitaxel/ramicirumab which was planned to start later this week pending improvement in performance status. Ms [employer] presented to ED 10/3/2022 with nausea, vomiting and diarrhea. CT AP showed diffuse wall thickening concerning for colitis vs serosal implants of tumor. She was started on CTX and Flagyl. Of note, scan indicated worsening hydronephrosis with Cr stable at 0.5. UC pending but UA showed leukocytes and WBC but no bacteria. She is feeling well other than nausea. She has not had diarrhea since yesterday. Review of Systems:  Constitutional +malaise. Denies fever, chills, weight loss, appetite changes, fatigue, night sweats. HEENT Denies trauma, blurry vision, hearing loss, ear pain, nosebleeds, sore throat, neck pain and ear discharge. Skin Denies lesions or rashes.    Lungs Denies dyspnea, cough, sputum production or hemoptysis. Cardiovascular Denies chest pain, palpitations, or lower extremity edema. Gastrointestinal +nausea, +diarrhea. Denies vomiting, bloody or black stools, abdominal pain.  Denies dysuria, frequency or hesitancy of urination. Neuro Denies headaches, visual changes or ataxia. Denies dizziness, tingling, tremors, sensory change, speech change, focal weakness or headaches. Hematology Denies easy bruising or bleeding, denies gingival bleeding or epistaxis. Endo Denies heat/cold intolerance, denies diabetes or thyroid abnormalities. MSK Denies back pain, arthralgias, myalgias or frequent falls. Psychiatric/Behavioral Denies depression and substance abuse. The patient is not nervous/anxious.          No Known Allergies  Past Medical History:   Diagnosis Date    Anemia     -- not a problem since hyst    Colon cancer ([facility] [address] dx 2022     plans for chemo--- followed by dr Louie EL-19 2020    no hospitalization    GERD (gastroesophageal reflux disease)     managed with med    History of colonoscopy 2018    Dr. Zhanna Khanna, nl (see media note), R     Hx of blood clots 2022    per pt \"small clot on lung identified by CT scan\"  CT Scan impression:- Nonobstructive pulmonary filling defect involving Left Lower Lobe     Hypotension     asymptomatic    Obstruction of fallopian tube     per pt has \"1 good tube\"    Peritoneal carcinomatosis ([facility] [address]     Weight loss     80lbs weight loss after gastric sleeve     Past Surgical History:   Procedure Laterality Date     SECTION      CYSTOSCOPY Bilateral 2022    CYSTOSCOPY BILATERAL RETROGRADE PYELOGRAM performed by Isha Denton MD [address] Bilateral 2022    BILATERAL CYSTOSCOPY URETERAL STENT EXCHANGE performed by Isha Denton MD at [address]  2014    gastric sleeve- Choudhari    HYSTERECTOMY (CERVIX STATUS UNKNOWN) 2018    HYSTERECTOMY (CERVIX STATUS UNKNOWN)  07/09/2020    TLH w/ Bilateral salpingectomy and left oophorectomy    IR PERC CATH PLEURAL DRAIN W/IMAG  9/26/2022    IR PERC CATH PLEURAL DRAIN W/IMAG 9/26/2022 [facility]    IR PORT PLACEMENT EQUAL OR GREATER THAN 5 YEARS  2/28/2022    IR PORT PLACEMENT EQUAL OR GREATER THAN 5 YEARS  2/28/2022    IR PORT PLACEMENT EQUAL OR GREATER THAN 5 YEARS 2/28/2022 [facility]    LAPAROTOMY N/A 8/17/2022    EXPLORATORY LAPAROTOMY/ ENTEROENTEROSTOMY performed by Jesse Lange MD at [facility]. [address] 89  age Nohemy Jansky 21s\"    also \"unblocked her FT\"    TONSILLECTOMY      UPPER GASTROINTESTINAL ENDOSCOPY      with dilation    UPPER GASTROINTESTINAL ENDOSCOPY N/A 9/13/2022    EGD ESOPHAGOGASTRODUODENOSCOPY OFL 17 To IR after recovery for Para performed by Krishna Davis MD at [facility] ENDOSCOPY    UROLOGICAL SURGERY Bilateral 03/15/2022    cysto     Family History   Problem Relation Age of Onset    Heart Disease Maternal Grandmother     Hypertension Maternal Grandmother     Heart Disease Maternal Grandfather     Hypertension Maternal Grandfather     Pulmonary Embolism Neg Hx     Colon Cancer Neg Hx     Deep Vein Thrombosis Neg Hx     Ovarian Cancer Neg Hx     Prostate Cancer Neg Hx     Breast Cancer Neg Hx      Social History     Socioeconomic History    Marital status: Single     Spouse name: Not on file    Number of children: Not on file    Years of education: Not on file    Highest education level: Not on file   Occupational History    Not on file   Tobacco Use    Smoking status: Never    Smokeless tobacco: Never   Vaping Use    Vaping Use: Never used   Substance and Sexual Activity    Alcohol use: Not Currently    Drug use: No    Sexual activity: Not on file   Other Topics Concern    Not on file   Social History Narrative    1. Use large speculum. 2.  sister, Gabe Valladares #[id number] and mom is Kita Sullivan #[id number] (both Kofoed's pts)  3.   PCP  Dr. Marcio Hare Jennifer Prasad (S), GI Dr. Becca Oliveira normal EGD     Social Determinants of Health     Financial Resource Strain: Not on file   Food Insecurity: Not on file   Transportation Needs: Not on file   Physical Activity: Not on file   Stress: Not on file   Social Connections: Not on file   Intimate Partner Violence: Not on file   Housing Stability: Not on file     Current Facility-Administered Medications   Medication Dose Route Frequency Provider Last Rate Last Admin    prochlorperazine (COMPAZINE) injection 10 mg  10 mg IntraVENous Q6H PRN May Chelo, APRN - CNP   10 mg at 10/04/22 1202    apixaban (ELIQUIS) tablet 5 mg  5 mg Oral BID Thu Lopez, DO   5 mg at 10/04/22 1055    docusate sodium (COLACE) capsule 100 mg  100 mg Oral BID Thu Lopez, DO   100 mg at 10/04/22 1055    [START ON 10/6/2022] vitamin D (ERGOCALCIFEROL) capsule 50,000 Units  50,000 Units Oral Q7 Days Thu Lopez, DO        metoclopramide (REGLAN) tablet 5 mg  5 mg Oral Daily Thu Lopez, DO   5 mg at 10/04/22 1054    oxyCODONE (ROXICODONE) immediate release tablet 5 mg  5 mg Oral Q6H PRN Thu Lopez, DO        pantoprazole (PROTONIX) tablet 40 mg  40 mg Oral BID Thu Lopez, DO   40 mg at 10/04/22 1055    sennosides-docusate sodium (SENOKOT-S) 8.6-50 MG tablet 2 tablet  2 tablet Oral Daily PRN Thu Lopez, DO        sucralfate (CARAFATE) tablet 1 g  1 g Oral 4x Daily AC & HS Thu Lopez, DO   1 g at 10/04/22 1053    valACYclovir (VALTREX) tablet 500 mg  500 mg Oral Daily Thu Lopez, DO   500 mg at 10/04/22 1054    sodium chloride flush 0.9 % injection 5-40 mL  5-40 mL IntraVENous 2 times per day Thu Lopez, DO   10 mL at 10/03/22 2341    sodium chloride flush 0.9 % injection 5-40 mL  5-40 mL IntraVENous PRN Thu Lopez, DO        0.9 % sodium chloride infusion   IntraVENous PRN Thu Lopez, DO        ondansetron (ZOFRAN-ODT) disintegrating tablet 4 mg  4 mg Oral Q8H PRN Thu Lopez, DO        Or    ondansetron (ZOFRAN) injection 4 mg  4 mg IntraVENous Q6H PRN Thu Lopez, DO   4 mg at 10/04/22 0609    polyethylene glycol (GLYCOLAX) packet 17 g  17 g Oral Daily PRN Thu Lopez, DO        acetaminophen (TYLENOL) tablet 650 mg  650 mg Oral Q6H PRN Thu Lopez, DO        Or    acetaminophen (TYLENOL) suppository 650 mg  650 mg Rectal Q6H PRN Thu Lopez, DO        cefTRIAXone (ROCEPHIN) 1,000 mg in sodium chloride 0.9 % 50 mL IVPB mini-bag  1,000 mg IntraVENous Q24H Thu Lopez, DO   Stopped at 10/04/22 0058    metroNIDAZOLE (FLAGYL) tablet 500 mg  500 mg Oral 3 times per day Thu Lopez, DO   500 mg at 10/04/22 0532    saccharomyces boulardii (FLORASTOR) capsule 250 mg  250 mg Oral BID Thu Lopez, DO   250 mg at 10/04/22 1053    morphine injection 2 mg  2 mg IntraVENous Q4H PRN Thu Lopez, DO   2 mg at 10/04/22 1111    albumin human 25 % IV solution 25 g  25 g IntraVENous Q6H Thu Lopez,  mL/hr at 10/04/22 1056 25 g at 10/04/22 1056       OBJECTIVE:  Patient Vitals for the past 8 hrs:   BP Temp Temp src Pulse Resp SpO2   10/04/22 1101 115/69 98 °F (36.7 °C) Oral 60 17 98 %   10/04/22 0736 118/73 97.8 °F (36.6 °C) Oral 62 15 99 %     Temp (24hrs), Av.2 °F (36.8 °C), Min:97.8 °F (36.6 °C), Max:99 °F (37.2 °C)    No intake/output data recorded. Physical Exam:  Constitutional: Well developed, well nourished female in no acute distress, sitting comfortably in the hospital bed. HEENT: Normocephalic and atraumatic. Oropharynx is clear, mucous membranes are moist.  Pupils are equal, round, and reactive to light. Extraocular muscles are intact. Sclerae anicteric. Neck supple without JVD. No thyromegaly present. Lymph node   No palpable submandibular, cervical, supraclavicular, axillary or inguinal lymph nodes. Skin Warm and dry. No bruising and no rash noted. No erythema. No pallor. Respiratory Lungs are clear to auscultation bilaterally without wheezes, rales or rhonchi, normal air exchange without accessory muscle use. CVS Normal rate, regular rhythm and normal S1 and S2.   No murmurs, gallops, or rubs. Abdomen Soft, nontender and nondistended, normoactive bowel sounds. No palpable mass. No hepatosplenomegaly. Neuro Grossly nonfocal with no obvious sensory or motor deficits. MSK Normal range of motion in general.  No edema and no tenderness. Psych Appropriate mood and affect.         Labs:    Recent Results (from the past 24 hour(s))   EKG 12 Lead    Collection Time: 10/03/22  3:41 PM   Result Value Ref Range    Ventricular Rate 74 BPM    Atrial Rate 74 BPM    P-R Interval 115 ms    QRS Duration 70 ms    Q-T Interval 388 ms    QTc Calculation (Bazett) 431 ms    P Axis 13 degrees    R Axis 76 degrees    T Axis 30 degrees    Diagnosis Sinus rhythm    Lactic Acid    Collection Time: 10/03/22  3:54 PM   Result Value Ref Range    Lactic Acid, Plasma 0.6 0.4 - 2.0 MMOL/L   CBC with Auto Differential    Collection Time: 10/03/22  3:54 PM   Result Value Ref Range    WBC 9.8 4.3 - 11.1 K/uL    RBC 3.25 (L) 4.05 - 5.2 M/uL    Hemoglobin 9.1 (L) 11.7 - 15.4 g/dL    Hematocrit 28.8 (L) 35.8 - 46.3 %    MCV 88.6 79.6 - 97.8 FL    MCH 28.0 26.1 - 32.9 PG    MCHC 31.6 31.4 - 35.0 g/dL    RDW 15.9 (H) 11.9 - 14.6 %    Platelets 635 819 - 935 K/uL    MPV 10.2 9.4 - 12.3 FL    nRBC 0.00 0.0 - 0.2 K/uL    Differential Type AUTOMATED      Seg Neutrophils 82 (H) 43 - 78 %    Lymphocytes 10 (L) 13 - 44 %    Monocytes 7 4.0 - 12.0 %    Eosinophils % 0 (L) 0.5 - 7.8 %    Basophils 0 0.0 - 2.0 %    Immature Granulocytes 1 0.0 - 5.0 %    Segs Absolute 8.1 1.7 - 8.2 K/UL    Absolute Lymph # 1.0 0.5 - 4.6 K/UL    Absolute Mono # 0.7 0.1 - 1.3 K/UL    Absolute Eos # 0.0 0.0 - 0.8 K/UL    Basophils Absolute 0.0 0.0 - 0.2 K/UL    Absolute Immature Granulocyte 0.1 0.0 - 0.5 K/UL   CMP    Collection Time: 10/03/22  3:54 PM   Result Value Ref Range    Sodium 136 136 - 145 mmol/L    Potassium 2.7 (L) 3.5 - 5.1 mmol/L    Chloride 100 (L) 101 - 110 mmol/L    CO2 26 21 - 32 mmol/L    Anion Gap 10 4 - 13 mmol/L    Glucose 97 65 - 100 mg/dL    BUN 7 6 - 23 MG/DL    Creatinine 0.50 (L) 0.6 - 1.0 MG/DL    Est, Glom Filt Rate >60 >60 ml/min/1.73m2    Calcium 7.8 (L) 8.3 - 10.4 MG/DL    Total Bilirubin 0.4 0.2 - 1.1 MG/DL    ALT 18 12 - 65 U/L    AST 17 15 - 37 U/L    Alk Phosphatase 205 (H) 50 - 136 U/L    Total Protein 5.2 (L) 6.3 - 8.2 g/dL    Albumin 1.7 (L) 3.5 - 5.0 g/dL    Globulin 3.5 2.3 - 3.5 g/dL    Albumin/Globulin Ratio 0.5 (L) 1.2 - 3.5     Procalcitonin    Collection Time: 10/03/22  3:54 PM   Result Value Ref Range    Procalcitonin <0.05 0.00 - 0.49 ng/mL   Magnesium    Collection Time: 10/03/22  3:54 PM   Result Value Ref Range    Magnesium 1.1 (L) 1.8 - 2.4 mg/dL   Urinalysis with Reflex to Culture    Collection Time: 10/03/22 11:10 PM    Specimen: Urine   Result Value Ref Range    Color, UA YELLOW/STRAW      Appearance CLOUDY      Specific [place], UA 1.013 1.001 - 1.023      pH, Urine 5.5 5.0 - 9.0      Protein, UA 30 (A) NEG mg/dL    Glucose, UA Negative mg/dL    Ketones, Urine 40 (A) NEG mg/dL    Bilirubin Urine Negative NEG      Blood, Urine MODERATE (A) NEG      Urobilinogen, Urine 1.0 0.2 - 1.0 EU/dL    Nitrite, Urine Negative NEG      Leukocyte Esterase, Urine TRACE (A) NEG      WBC, UA 10-20 0 /hpf    RBC, UA 0-3 0 /hpf    BACTERIA, URINE 0 0 /hpf    Urine Culture if Indicated URINE CULTURE ORDERED      Epithelial Cells UA 3-5 0 /hpf    Casts 0 0 /lpf    Crystals 0 0 /LPF    Mucus, UA 3+ (H) 0 /lpf    OTHER OBSERVATIONS RESULTS VERIFIED MANUALLY     Culture, Urine    Collection Time: 10/03/22 11:10 PM    Specimen: Urine   Result Value Ref Range    Special Requests NO SPECIAL REQUESTS  Reflexed from [id number]        Culture        No growth after short period of incubation. Further results to follow after overnight incubation.    Comprehensive Metabolic Panel w/ Reflex to MG    Collection Time: 10/04/22  6:00 AM   Result Value Ref Range    Sodium 136 136 - 145 mmol/L    Potassium 4.0 3.5 - 5.1 mmol/L    Chloride 100 (L) 101 - 110 mmol/L    CO2 28 21 - 32 mmol/L    Anion Gap 8 4 - 13 mmol/L    Glucose 75 65 - 100 mg/dL    BUN 6 6 - 23 MG/DL    Creatinine 0.50 (L) 0.6 - 1.0 MG/DL    Est, Glom Filt Rate >60 >60 ml/min/1.73m2    Calcium 7.8 (L) 8.3 - 10.4 MG/DL    Total Bilirubin 0.4 0.2 - 1.1 MG/DL    ALT 15 12 - 65 U/L    AST 9 (L) 15 - 37 U/L    Alk Phosphatase 134 50 - 136 U/L    Total Protein 4.7 (L) 6.3 - 8.2 g/dL    Albumin 2.5 (L) 3.5 - 5.0 g/dL    Globulin 2.2 (L) 2.3 - 3.5 g/dL    Albumin/Globulin Ratio 1.1 (L) 1.2 - 3.5     Magnesium    Collection Time: 10/04/22  6:00 AM   Result Value Ref Range    Magnesium 1.8 1.8 - 2.4 mg/dL   CBC with Auto Differential    Collection Time: 10/04/22  6:00 AM   Result Value Ref Range    WBC 8.0 4.3 - 11.1 K/uL    RBC 2.66 (L) 4.05 - 5.2 M/uL    Hemoglobin 7.3 (L) 11.7 - 15.4 g/dL    Hematocrit 23.7 (L) 35.8 - 46.3 %    MCV 89.1 79.6 - 97.8 FL    MCH 27.4 26.1 - 32.9 PG    MCHC 30.8 (L) 31.4 - 35.0 g/dL    RDW 15.9 (H) 11.9 - 14.6 %    Platelets 890 080 - 613 K/uL    MPV 10.4 9.4 - 12.3 FL    nRBC 0.00 0.0 - 0.2 K/uL    Differential Type AUTOMATED      Seg Neutrophils 81 (H) 43 - 78 %    Lymphocytes 10 (L) 13 - 44 %    Monocytes 8 4.0 - 12.0 %    Eosinophils % 0 (L) 0.5 - 7.8 %    Basophils 0 0.0 - 2.0 %    Immature Granulocytes 1 0.0 - 5.0 %    Segs Absolute 6.5 1.7 - 8.2 K/UL    Absolute Lymph # 0.8 0.5 - 4.6 K/UL    Absolute Mono # 0.6 0.1 - 1.3 K/UL    Absolute Eos # 0.0 0.0 - 0.8 K/UL    Basophils Absolute 0.0 0.0 - 0.2 K/UL    Absolute Immature Granulocyte 0.0 0.0 - 0.5 K/UL   Ferritin    Collection Time: 10/04/22  6:00 AM   Result Value Ref Range    Ferritin 100 8 - 388 NG/ML   Transferrin Saturation    Collection Time: 10/04/22  6:00 AM   Result Value Ref Range    Iron 27 (L) 35 - 150 ug/dL    TIBC 85 (L) 250 - 450 ug/dL    TRANSFERRIN SATURATION 32 >20 %   Calcium, Ionized    Collection Time: 10/04/22  6:00 AM   Result Value Ref Range    Calcium, Ionized 4.16 4.0 - 5.2 mg/dL Imaging:    CT Result (most recent):  CT CHEST ABDOMEN PELVIS W CONTRAST 10/03/2022    Narrative  CT CHEST ABDOMEN AND PELVIS WITH CONTRAST  10/3/2022 5:15 PM    HISTORY:  PE; hx of PE; left chest pain; diffuse abdominal pain. Peritoneal  carcinomatosis. TECHNIQUE: The patient received 100 mL Isovue-370 nonionic IV contrast and axial  images were obtained through the chest, abdomen, and pelvis. Multiplanar  reformatted images were generated. All CT scans at this facility used dose  modulation, interactive reconstruction and/or weight based dosing when  appropriate to reduce radiation dose to as low as reasonably achievable. COMPARISON:  June 22, 2022    CHEST: There are no filling defects within the main or proximal segmental  pulmonary artery suggestive of emboli. The thoracic aorta is well-opacified without dissection. There is no thoracic adenopathy. A right-sided chest port is present with  catheter tip in right atrium. Bilateral pleural effusions are present. There is lower lobe dependent  atelectasis. A few groundglass opacities in the right upper lobe may be areas  of active alveolitis (image 48). ABDOMEN: A percutaneous catheter or drain is present in the right upper  quadrant. A surgical staple line along the outer margin of the stomach is  compatible with a sleeve gastrectomy. Gallbladder: Mucosal enhancement. Pericholecystic fluid. No visible stones. Liver: Mild intrahepatic biliary ductal dilatation. No focal hepatic lesions. Pancreas: Normal.    Spleen: Normal.    Adrenal glands: Normal.    Kidneys: Bilateral ureteral stents are present with proximal loops in the  distended renal pelvis and distal loops in the bladder. Bilateral significant  hydronephrosis has progressed since June 2022. Bowel: The appendix is mildly distended. There is diffuse bowel wall thickening  involving the colon and there are several dilated loops small bowel.   This  appearance may be caused by serosal tumor implants or colitis. Small volume  ascites is present and there is diffuse heterogeneous enhancement of the omental  fat (image 28) compatible with metastatic omental implants. Retroperitoneum:No adenopathy. Abdominal aorta is normal in caliber. PELVIS:Ascites. Diffuse bladder wall thickening. Hysterectomy. Bones: There are no aggressive osseous lesions. Impression  1. No pulmonary embolism. 2.  Mild biliary ductal dilatation. 3.  Omental tumor implants, ascites and diffuse bowel wall thickening may be  caused by serosal tumor implants or colitis. 4.  Progression of bilateral hydronephrosis. ASSESSMENT:      ICD-10-CM    1. Intractable vomiting with nausea  R11.2       2. Hypokalemia  E87.6              PLAN:    Metastatic carcinoma / peritoneal carcinomatosis / ascites  - Abd Pleurx drain in place - drain per protocol  - Dr Sumeet Ohara paclitaxel/ramicirumab as outpatient later this week; currently no plans to give inpatient and with possible colitis    Abdominal pain / nausea /vomiting  - PRN antiemetics  - CT with possible colitis - change CTX/flagyl to LVQ/flagyl  - Check for C diff if diarrhea recurrs    Hx of hydronephrosis  - Dr Weir Novel exchange; would like to complete prior to chemotherapy, if able early next week. Hx of DVT   - Continue Eliquis    Anemia  - Monitor, transfuse for Hgb <7    Consult PT/OT  Continue home meds  VTE prophylaxis - on Eliquis    Dispo - too soon to determine. Lab studies and imaging studies were personally reviewed. Pertinent old records were reviewed from [address] [place] [address]. Thank you for allowing us to participate in the care of Ms. Jacklyn Castro. We will gladly assume care of our patient.                SCOTT Wynn - [facility]ology [facility] On[facility]  [address] [place] [address]  Office : [phone]  Fax : [phone]      Attending Addendum:  I have personally performed a face to face diagnostic evaluation on this patient. I have reviewed and agree with the care plan as documented above by  Manju Rai N.P.  My findings are as follows: Patient appears stable, heart rate regular without murmurs, abdomen is non-tender, bowel sounds are positive. 70-year-old patient history of metastatic carcinoma of possible GI origin with known peritoneal carcinomatosis, recently received Avastin-FOLFOX 9/8/2022. Has a Pleurx drain placement for recurrent ascites. On recent evaluation has been recommended to switch therapy to paclitaxel/ramucirumab. She is now admitted through the ED with nausea, vomiting and diarrhea. Contrast showed CT CAP with omental tumor implants, ascites as well as diffuse bowel wall thickening possibly caused by serosal implants versus colitis. Currently being treated with antibiotics including Levaquin and Flagyl. Supportive  care as above. Remains significantly debilitated.           Cooper Lebron MD  [facility] Hematology/Oncology  [id number] [place] [address]  Office : [phone]  Fax : [phone]

## 2022-10-05 ENCOUNTER — ANESTHESIA EVENT (OUTPATIENT)
Dept: SURGERY | Age: 47
DRG: 988 | End: 2022-10-05
Payer: COMMERCIAL

## 2022-10-05 LAB
ALBUMIN SERPL-MCNC: 3.2 G/DL (ref 3.5–5)
ALBUMIN/GLOB SERPL: 1.5 {RATIO} (ref 1.2–3.5)
ALP SERPL-CCNC: 106 U/L (ref 50–136)
ALT SERPL-CCNC: 11 U/L (ref 12–65)
ANION GAP SERPL CALC-SCNC: 6 MMOL/L (ref 4–13)
APPEARANCE FLD: CLEAR
AST SERPL-CCNC: 6 U/L (ref 15–37)
BASOPHILS # BLD: 0 K/UL (ref 0–0.2)
BASOPHILS NFR BLD: 0 % (ref 0–2)
BILIRUB SERPL-MCNC: 0.4 MG/DL (ref 0.2–1.1)
BUN SERPL-MCNC: 3 MG/DL (ref 6–23)
CALCIUM SERPL-MCNC: 8.2 MG/DL (ref 8.3–10.4)
CHLORIDE SERPL-SCNC: 102 MMOL/L (ref 101–110)
CO2 SERPL-SCNC: 29 MMOL/L (ref 21–32)
COLOR FLD: YELLOW
CREAT SERPL-MCNC: 0.5 MG/DL (ref 0.6–1)
DIFFERENTIAL METHOD BLD: ABNORMAL
EOSINOPHIL # BLD: 0 K/UL (ref 0–0.8)
EOSINOPHIL NFR BLD: 0 % (ref 0.5–7.8)
ERYTHROCYTE [DISTWIDTH] IN BLOOD BY AUTOMATED COUNT: 15.8 % (ref 11.9–14.6)
GLOBULIN SER CALC-MCNC: 2.1 G/DL (ref 2.3–3.5)
GLUCOSE SERPL-MCNC: 87 MG/DL (ref 65–100)
HCT VFR BLD AUTO: 23.1 % (ref 35.8–46.3)
HGB BLD-MCNC: 7.1 G/DL (ref 11.7–15.4)
IMM GRANULOCYTES # BLD AUTO: 0.1 K/UL (ref 0–0.5)
IMM GRANULOCYTES NFR BLD AUTO: 1 % (ref 0–5)
LDH FLD L TO P-CCNC: 291 U/L
LYMPHOCYTES # BLD: 0.7 K/UL (ref 0.5–4.6)
LYMPHOCYTES NFR BLD: 8 % (ref 13–44)
LYMPHOCYTES NFR BRONCH MANUAL: 17 %
MACROPHAGES NFR BRONCH MANUAL: 4 %
MAGNESIUM SERPL-MCNC: 1.5 MG/DL (ref 1.8–2.4)
MCH RBC QN AUTO: 27.1 PG (ref 26.1–32.9)
MCHC RBC AUTO-ENTMCNC: 30.7 G/DL (ref 31.4–35)
MCV RBC AUTO: 88.2 FL (ref 79.6–97.8)
MONOCYTES # BLD: 0.7 K/UL (ref 0.1–1.3)
MONOCYTES NFR BLD: 7 % (ref 4–12)
NEUTROPHILS NFR BRONCH MANUAL: 79 %
NEUTS SEG # BLD: 7.7 K/UL (ref 1.7–8.2)
NEUTS SEG NFR BLD: 84 % (ref 43–78)
NRBC # BLD: 0 K/UL (ref 0–0.2)
NUC CELL # FLD: 419 /CU MM
PLATELET # BLD AUTO: 232 K/UL (ref 150–450)
PMV BLD AUTO: 10.1 FL (ref 9.4–12.3)
POTASSIUM SERPL-SCNC: 3.1 MMOL/L (ref 3.5–5.1)
PROT SERPL-MCNC: 5.3 G/DL (ref 6.3–8.2)
RBC # BLD AUTO: 2.62 M/UL (ref 4.05–5.2)
RBC # FLD: 1000 /CU MM
SODIUM SERPL-SCNC: 137 MMOL/L (ref 136–145)
SPECIMEN SOURCE FLD: NORMAL
SPECIMEN SOURCE FLD: NORMAL
WBC # BLD AUTO: 9.1 K/UL (ref 4.3–11.1)

## 2022-10-05 PROCEDURE — 89050 BODY FLUID CELL COUNT: CPT

## 2022-10-05 PROCEDURE — 97165 OT EVAL LOW COMPLEX 30 MIN: CPT

## 2022-10-05 PROCEDURE — 2580000003 HC RX 258: Performed by: FAMILY MEDICINE

## 2022-10-05 PROCEDURE — 6370000000 HC RX 637 (ALT 250 FOR IP): Performed by: FAMILY MEDICINE

## 2022-10-05 PROCEDURE — 87205 SMEAR GRAM STAIN: CPT

## 2022-10-05 PROCEDURE — 1100000000 HC RM PRIVATE

## 2022-10-05 PROCEDURE — APPSS45 APP SPLIT SHARED TIME 31-45 MINUTES: Performed by: NURSE PRACTITIONER

## 2022-10-05 PROCEDURE — 97530 THERAPEUTIC ACTIVITIES: CPT

## 2022-10-05 PROCEDURE — P9047 ALBUMIN (HUMAN), 25%, 50ML: HCPCS | Performed by: FAMILY MEDICINE

## 2022-10-05 PROCEDURE — 2580000003 HC RX 258: Performed by: NURSE PRACTITIONER

## 2022-10-05 PROCEDURE — 97535 SELF CARE MNGMENT TRAINING: CPT

## 2022-10-05 PROCEDURE — 36591 DRAW BLOOD OFF VENOUS DEVICE: CPT

## 2022-10-05 PROCEDURE — 6360000002 HC RX W HCPCS: Performed by: INTERNAL MEDICINE

## 2022-10-05 PROCEDURE — 6360000002 HC RX W HCPCS: Performed by: NURSE PRACTITIONER

## 2022-10-05 PROCEDURE — 6360000002 HC RX W HCPCS: Performed by: FAMILY MEDICINE

## 2022-10-05 PROCEDURE — 80053 COMPREHEN METABOLIC PANEL: CPT

## 2022-10-05 PROCEDURE — 83735 ASSAY OF MAGNESIUM: CPT

## 2022-10-05 PROCEDURE — 97162 PT EVAL MOD COMPLEX 30 MIN: CPT

## 2022-10-05 PROCEDURE — 83615 LACTATE (LD) (LDH) ENZYME: CPT

## 2022-10-05 PROCEDURE — 85025 COMPLETE CBC W/AUTO DIFF WBC: CPT

## 2022-10-05 PROCEDURE — 99232 SBSQ HOSP IP/OBS MODERATE 35: CPT | Performed by: INTERNAL MEDICINE

## 2022-10-05 RX ORDER — MAGNESIUM SULFATE IN WATER 40 MG/ML
2000 INJECTION, SOLUTION INTRAVENOUS ONCE
Status: COMPLETED | OUTPATIENT
Start: 2022-10-05 | End: 2022-10-05

## 2022-10-05 RX ORDER — POTASSIUM CHLORIDE 29.8 MG/ML
20 INJECTION INTRAVENOUS ONCE
Status: COMPLETED | OUTPATIENT
Start: 2022-10-05 | End: 2022-10-05

## 2022-10-05 RX ORDER — POTASSIUM CHLORIDE 29.8 MG/ML
20 INJECTION INTRAVENOUS ONCE
Status: DISCONTINUED | OUTPATIENT
Start: 2022-10-05 | End: 2022-10-08

## 2022-10-05 RX ADMIN — POTASSIUM CHLORIDE 20 MEQ: 29.8 INJECTION, SOLUTION INTRAVENOUS at 09:24

## 2022-10-05 RX ADMIN — PANTOPRAZOLE SODIUM 40 MG: 40 TABLET, DELAYED RELEASE ORAL at 07:59

## 2022-10-05 RX ADMIN — SUCRALFATE 1 G: 1 TABLET ORAL at 12:39

## 2022-10-05 RX ADMIN — SODIUM CHLORIDE, PRESERVATIVE FREE 10 ML: 5 INJECTION INTRAVENOUS at 21:15

## 2022-10-05 RX ADMIN — SUCRALFATE 1 G: 1 TABLET ORAL at 07:59

## 2022-10-05 RX ADMIN — PANTOPRAZOLE SODIUM 40 MG: 40 TABLET, DELAYED RELEASE ORAL at 21:13

## 2022-10-05 RX ADMIN — MAGNESIUM SULFATE HEPTAHYDRATE 2000 MG: 40 INJECTION, SOLUTION INTRAVENOUS at 14:51

## 2022-10-05 RX ADMIN — MORPHINE SULFATE 2 MG: 2 INJECTION, SOLUTION INTRAMUSCULAR; INTRAVENOUS at 13:36

## 2022-10-05 RX ADMIN — MORPHINE SULFATE 2 MG: 2 INJECTION, SOLUTION INTRAMUSCULAR; INTRAVENOUS at 09:20

## 2022-10-05 RX ADMIN — PROCHLORPERAZINE EDISYLATE 10 MG: 5 INJECTION INTRAMUSCULAR; INTRAVENOUS at 09:20

## 2022-10-05 RX ADMIN — METRONIDAZOLE 500 MG: 500 TABLET ORAL at 21:13

## 2022-10-05 RX ADMIN — PROCHLORPERAZINE EDISYLATE 10 MG: 5 INJECTION INTRAMUSCULAR; INTRAVENOUS at 03:54

## 2022-10-05 RX ADMIN — METRONIDAZOLE 500 MG: 500 TABLET ORAL at 07:59

## 2022-10-05 RX ADMIN — ACETAMINOPHEN 650 MG: 325 TABLET, FILM COATED ORAL at 03:54

## 2022-10-05 RX ADMIN — MORPHINE SULFATE 2 MG: 2 INJECTION, SOLUTION INTRAMUSCULAR; INTRAVENOUS at 04:03

## 2022-10-05 RX ADMIN — LEVOFLOXACIN 750 MG: 5 INJECTION, SOLUTION INTRAVENOUS at 12:35

## 2022-10-05 RX ADMIN — FOSAPREPITANT 150 MG: 150 INJECTION, POWDER, LYOPHILIZED, FOR SOLUTION INTRAVENOUS at 15:36

## 2022-10-05 RX ADMIN — ALBUMIN (HUMAN) 25 G: 0.25 INJECTION, SOLUTION INTRAVENOUS at 00:15

## 2022-10-05 RX ADMIN — SUCRALFATE 1 G: 1 TABLET ORAL at 19:16

## 2022-10-05 RX ADMIN — APIXABAN 5 MG: 5 TABLET, FILM COATED ORAL at 07:59

## 2022-10-05 RX ADMIN — PROCHLORPERAZINE EDISYLATE 10 MG: 5 INJECTION INTRAMUSCULAR; INTRAVENOUS at 13:36

## 2022-10-05 RX ADMIN — MORPHINE SULFATE 2 MG: 2 INJECTION, SOLUTION INTRAMUSCULAR; INTRAVENOUS at 21:13

## 2022-10-05 RX ADMIN — SODIUM CHLORIDE, PRESERVATIVE FREE 10 ML: 5 INJECTION INTRAVENOUS at 08:01

## 2022-10-05 RX ADMIN — METOCLOPRAMIDE 5 MG: 10 TABLET ORAL at 07:59

## 2022-10-05 RX ADMIN — PROCHLORPERAZINE EDISYLATE 10 MG: 5 INJECTION INTRAMUSCULAR; INTRAVENOUS at 21:13

## 2022-10-05 RX ADMIN — METRONIDAZOLE 500 MG: 500 TABLET ORAL at 13:36

## 2022-10-05 RX ADMIN — ALBUMIN (HUMAN) 25 G: 0.25 INJECTION, SOLUTION INTRAVENOUS at 06:07

## 2022-10-05 ASSESSMENT — PAIN SCALES - GENERAL
PAINLEVEL_OUTOF10: 8
PAINLEVEL_OUTOF10: 7
PAINLEVEL_OUTOF10: 0
PAINLEVEL_OUTOF10: 0
PAINLEVEL_OUTOF10: 7
PAINLEVEL_OUTOF10: 7

## 2022-10-05 ASSESSMENT — PAIN - FUNCTIONAL ASSESSMENT: PAIN_FUNCTIONAL_ASSESSMENT: PREVENTS OR INTERFERES SOME ACTIVE ACTIVITIES AND ADLS

## 2022-10-05 ASSESSMENT — PAIN DESCRIPTION - DESCRIPTORS
DESCRIPTORS: ACHING
DESCRIPTORS: ACHING;CRAMPING;NAGGING

## 2022-10-05 ASSESSMENT — PAIN DESCRIPTION - ORIENTATION
ORIENTATION: MID
ORIENTATION: MID

## 2022-10-05 ASSESSMENT — PAIN DESCRIPTION - PAIN TYPE: TYPE: CHRONIC PAIN

## 2022-10-05 ASSESSMENT — PAIN DESCRIPTION - LOCATION
LOCATION: ABDOMEN
LOCATION: ABDOMEN

## 2022-10-05 ASSESSMENT — PAIN DESCRIPTION - FREQUENCY: FREQUENCY: CONTINUOUS

## 2022-10-05 ASSESSMENT — PAIN DESCRIPTION - ONSET: ONSET: ON-GOING

## 2022-10-05 NOTE — PROGRESS NOTES
ACUTE PHYSICAL THERAPY GOALS:   (Developed with and agreed upon by patient and/or caregiver.)    (1.)Ms. Kim Tompkins will tolerate 25+ minutes of therapeutic exercise in order to demonstrate improved activity tolerance within 7 treatment days. (2.)Ms. Kim Tompkins will transfer from bed to chair and chair to bed with INDEPENDENCE using the least restrictive device within 5 treatment day(s). (3.)Ms. Kim Tompkins will ambulate with INDEPENDENCE for a minimum of 500 feet with the least restrictive device within 7 treatment day(s). ________________________________________________________________________________________________     PHYSICAL THERAPY Initial Assessment and Daily Note  (Link to Caseload Tracking: PT Visit Days : 1  Acknowledge Orders  Time In/Out  PT Charge Capture  Rehab Caseload Tracker    Sara Salazar is a 52 y.o. female   PRIMARY DIAGNOSIS: Abdominal pain  Hypokalemia [E87.6]  Abdominal pain [R10.9]  Intractable vomiting with nausea [R11.2]       Reason for Referral: Generalized Muscle Weakness (M62.81)  Other lack of cordination (R27.8)  Difficulty in walking, Not elsewhere classified (R26.2)  Other abnormalities of gait and mobility (R26.89)  Inpatient: Payor: Anastasiya Barnes / Plan: Sue Hankins / Product Type: *No Product type* /     ASSESSMENT:     REHAB RECOMMENDATIONS:   Recommendation to date pending progress:  Setting:  No further skilled therapy after discharge from hospital    Equipment:    To Be Determined     ASSESSMENT:  Ms. Kim Tompkins presents with abdominal pain and a PMH significant for carcinoma with mets receiving active chemo. Today she was able to perform bed mobility with modified independence and ambulated for 280' with use of the IV pole for stability. She demonstrated decreased overall activity tolerance and balance with mobility with increased trunk flexion while ambulating. She would continue to benefit from skilled acute care PT to facilitate improvements in the above stated deficits.  No needs anticipated after DC.      [address] [facility] [place] [facility]\"" [address] [address] AM-PAC 6 Clicks Basic Mobility Inpatient Short Form  AM-PAC Mobility Inpatient   How much difficulty turning over in bed?: None  How much difficulty sitting down on / standing up from a chair with arms?: None  How much difficulty moving from lying on back to sitting on side of bed?: None  How much help from another person moving to and from a bed to a chair?: A Little  How much help from another person needed to walk in hospital room?: A Little  How much help from another person for climbing 3-5 steps with a railing?: A Little  [facility] Inpatient Mobility Raw Score : 21  AM-PAC Inpatient T-Scale Score : 50.25  Mobility Inpatient CMS 0-100% Score: 28.97  Mobility Inpatient CMS G-Code Modifier : CJ    SUBJECTIVE:   Ms. Walker states, \"I have a 15year old\"     Social/Functional Type of Home: House  Receives Help From: Family  ADL Assistance: Independent  Ambulation Assistance: Independent  Transfer Assistance: Independent  Active : Yes    OBJECTIVE:     PAIN: Jef De La Cruz / O2: PRECAUTION / Kenyon Shanices / Krish Chirinosler:   Pre Treatment:   Pain Assessment: None - Denies Pain      Post Treatment: 0/10 Vitals        Oxygen      IV and Purewick    RESTRICTIONS/PRECAUTIONS:                    GROSS EVALUATION: Intact Impaired (Comments):   AROM [x]     PROM []    Strength []  Generalized weakness   Balance [] Posture: Good  Sitting - Static: Good  Sitting - Dynamic: Good  Standing - Static: Good  Standing - Dynamic: Fair, +   Posture [] N/A   Sensation []  N/T in R foot and hands   Coordination []      Tone []     Edema []    Activity Tolerance [] Patient Tolerated treatment well, Patient limited by fatigue    []      COGNITION/  PERCEPTION: Intact Impaired (Comments):   Orientation [x]     Vision [x]     Hearing [x]     Cognition  [x]       MOBILITY: I Mod I S SBA CGA Min Mod Max Total  NT x2 Comments:   Bed Mobility    Rolling [] [x] [] [] [] [] [] [] [] [] []    Supine to Sit [] [x] [] [] [] [] [] [] [] [] []    Scooting [] [] [] [] [] [] [] [] [x] [] []    Sit to Supine [] [x] [] [] [] [] [] [] [] [] []    Transfers    Sit to Stand [] [] [] [x] [] [] [] [] [] [] []    Bed to Chair [] [] [] [] [] [] [] [] [] [x] []    Stand to Sit [] [] [] [x] [] [] [] [] [] [] []     [] [] [] [] [] [] [] [] [] [] []    I=Independent, Mod I=Modified Independent, S=Supervision, SBA=Standby Assistance, CGA=Contact Guard Assistance,   Min=Minimal Assistance, Mod=Moderate Assistance, Max=Maximal Assistance, Total=Total Assistance, NT=Not Tested    GAIT: I Mod I S SBA CGA Min Mod Max Total  NT x2 Comments:   Level of Assistance [] [] [] [x] [] [] [] [] [] [] []    Distance 280 feet    DME IV pole    Gait Quality Decreased mason , Decreased step length, and Trunk sway increased    Weightbearing Status      Stairs      I=Independent, Mod I=Modified Independent, S=Supervision, SBA=Standby Assistance, CGA=Contact Guard Assistance,   Min=Minimal Assistance, Mod=Moderate Assistance, Max=Maximal Assistance, Total=Total Assistance, NT=Not Tested    PLAN:   FREQUENCY AND DURATION: 3 times/week for duration of hospital stay or until stated goals are met, whichever comes first.    THERAPY PROGNOSIS: Good    PROBLEM LIST:   (Skilled intervention is medically necessary to address:)  Decreased Activity Tolerance  Decreased Balance  Decreased Gait Ability  Decreased Strength  Decreased Transfer Abilities INTERVENTIONS PLANNED:   (Benefits and precautions of physical therapy have been discussed with the patient.)  Therapeutic Activity  Therapeutic Exercise/HEP  Neuromuscular Re-education  Gait Training       TREATMENT:   EVALUATION: MODERATE COMPLEXITY: (Untimed Charge)    TREATMENT:   Therapeutic Activity (10 Minutes):  Therapeutic activity included Supine to Sit, Sit to Supine, Scooting, Transfer Training, Ambulation on level ground, Sitting balance , and Standing balance to improve functional Activity tolerance, Balance, Coordination, and Mobility.     TREATMENT GRID:  N/A    AFTER TREATMENT PRECAUTIONS: Bed, Bed/Chair Locked, Call light within reach, Needs within reach, and RN notified    INTERDISCIPLINARY COLLABORATION:  RN/ PCT and PT/ PTA    EDUCATION: Education Given To: Patient  Education Provided: Role of Therapy;Plan of Care  Education Method: Verbal  Barriers to Learning: None  Education Outcome: Verbalized understanding    TIME IN/OUT:  Time In: 0950  Time Out: 1003  Minutes: [facility], [address]

## 2022-10-05 NOTE — PROGRESS NOTES
Pt with a temp of 101.7, Np on call was notified, pt has previously had blood culture, Uirne culture, U/A as well as a chest Xray. Tylenol given PO and no other orders at this time will continue to monitor.

## 2022-10-05 NOTE — PROGRESS NOTES
[facility] Hematology & Oncology        Inpatient Hematology / Oncology Progress Note    Reason for Admission:  Hypokalemia [E87.6]  Abdominal pain [R10.9]  Intractable vomiting with nausea [R11.2]    24 Hour Events:  VSS, Tmax 101.7  BC/UC NGTD  Feeling improved  No reported diarrhea        ROS:  Constitutional: Negative for fever, chills, weakness, malaise, fatigue. CV: Negative for chest pain, palpitations, edema. Respiratory: Negative for dyspnea, cough, wheezing. GI: Negative for nausea, abdominal pain, diarrhea. 10 point review of systems is otherwise negative with the exception of the elements mentioned above in the HPI.            No Known Allergies  Past Medical History:   Diagnosis Date    Anemia     -- not a problem since hyst    Colon cancer ([facility] [address] dx 2022     plans for chemo--- followed by dr Brii Wheeler    COVID-19 2020    no hospitalization    GERD (gastroesophageal reflux disease)     managed with med    History of colonoscopy 2018    louis Craig (see media note), R     Hx of blood clots 2022    per pt \"small clot on lung identified by CT scan\"  CT Scan impression:- Nonobstructive pulmonary filling defect involving Left Lower Lobe     Hypotension     asymptomatic    Obstruction of fallopian tube     per pt has \"1 good tube\"    Peritoneal carcinomatosis ([facility] [address]     Weight loss     80lbs weight loss after gastric sleeve     Past Surgical History:   Procedure Laterality Date     SECTION      CYSTOSCOPY Bilateral 2022    CYSTOSCOPY BILATERAL RETROGRADE PYELOGRAM performed by Robbin Mackey MD at [address] Bilateral 2022    BILATERAL CYSTOSCOPY URETERAL STENT EXCHANGE performed by Robbin Mackey MD at [address]  2014    gastric sleeve- Choudhari    HYSTERECTOMY (CERVIX STATUS UNKNOWN)      2018    HYSTERECTOMY (CERVIX STATUS UNKNOWN)  2020    TLH w/ Bilateral salpingectomy and left oophorectomy    IR PERC CATH PLEURAL DRAIN W/IMAG  9/26/2022    IR PERC CATH PLEURAL DRAIN W/IMAG 9/26/2022 [facility]    IR PORT PLACEMENT EQUAL OR GREATER THAN 5 YEARS  2/28/2022    IR PORT PLACEMENT EQUAL OR GREATER THAN 5 YEARS  2/28/2022    IR PORT PLACEMENT EQUAL OR GREATER THAN 5 YEARS 2/28/2022 [facility]    LAPAROTOMY N/A 8/17/2022    EXPLORATORY LAPAROTOMY/ ENTEROENTEROSTOMY performed by Kelly Nation MD at [facility]. [address]  age Jearline Neighbours 21s\"    also \"unblocked her FT\"    TONSILLECTOMY      UPPER GASTROINTESTINAL ENDOSCOPY      with dilation    UPPER GASTROINTESTINAL ENDOSCOPY N/A 9/13/2022    EGD ESOPHAGOGASTRODUODENOSCOPY OFL 17 To IR after recovery for Para performed by Abeba Buckley MD at [facility] ENDOSCOPY    UROLOGICAL SURGERY Bilateral 03/15/2022    cysto     Family History   Problem Relation Age of Onset    Heart Disease Maternal Grandmother     Hypertension Maternal Grandmother     Heart Disease Maternal Grandfather     Hypertension Maternal Grandfather     Pulmonary Embolism Neg Hx     Colon Cancer Neg Hx     Deep Vein Thrombosis Neg Hx     Ovarian Cancer Neg Hx     Prostate Cancer Neg Hx     Breast Cancer Neg Hx      Social History     Socioeconomic History    Marital status: Single     Spouse name: Not on file    Number of children: Not on file    Years of education: Not on file    Highest education level: Not on file   Occupational History    Not on file   Tobacco Use    Smoking status: Never    Smokeless tobacco: Never   Vaping Use    Vaping Use: Never used   Substance and Sexual Activity    Alcohol use: Not Currently    Drug use: No    Sexual activity: Not on file   Other Topics Concern    Not on file   Social History Narrative    1. Use large speculum. 2.  sister, Viviane Butler #[id number] and mom is Imani Pathak #[id number] (both Kofoed's pts)  3.   PCP  Dr. Douglas Quintero ([facility] [place]), GI Dr. Dragan Guzmán normal EGD     Social Determinants of Health     Financial Resource Strain: Not on file   Food Insecurity: Not on file   Transportation Needs: Not on file   Physical Activity: Not on file   Stress: Not on file   Social Connections: Not on file   Intimate Partner Violence: Not on file   Housing Stability: Not on file     Current Facility-Administered Medications   Medication Dose Route Frequency Provider Last Rate Last Admin    potassium chloride 20 mEq/50 mL IVPB (Central Line)  20 mEq IntraVENous Once Chanel Moreira MD        magnesium sulfate 2000 mg in 50 mL IVPB premix  2,000 mg IntraVENous Once Chanel Moreira MD        prochlorperazine (COMPAZINE) injection 10 mg  10 mg IntraVENous Q6H PRN Kathrine Ku APRN - CNP   10 mg at 10/05/22 1336    levoFLOXacin (LEVAQUIN) 750 MG/150ML infusion 750 mg  750 mg IntraVENous Q24H SCOTT Orozco -  mL/hr at 10/05/22 1235 750 mg at 10/05/22 1235    apixaban (ELIQUIS) tablet 5 mg  5 mg Oral BID Thu Lopez, DO   5 mg at 10/05/22 0759    docusate sodium (COLACE) capsule 100 mg  100 mg Oral BID Thu Lopez, DO   100 mg at 10/04/22 1055    [START ON 10/6/2022] vitamin D (ERGOCALCIFEROL) capsule 50,000 Units  50,000 Units Oral Q7 Days Thu Lopez, DO        metoclopramide (REGLAN) tablet 5 mg  5 mg Oral Daily Thu Lopez, DO   5 mg at 10/05/22 0759    oxyCODONE (ROXICODONE) immediate release tablet 5 mg  5 mg Oral Q6H PRN Thu Lopez, DO        pantoprazole (PROTONIX) tablet 40 mg  40 mg Oral BID Thu Lopez, DO   40 mg at 10/05/22 0759    sennosides-docusate sodium (SENOKOT-S) 8.6-50 MG tablet 2 tablet  2 tablet Oral Daily PRN Thu Lopez, DO        sucralfate (CARAFATE) tablet 1 g  1 g Oral 4x Daily AC & HS Thu Lopez, DO   1 g at 10/05/22 1239    valACYclovir (VALTREX) tablet 500 mg  500 mg Oral Daily Thu Lopez, DO   500 mg at 10/04/22 1054    sodium chloride flush 0.9 % injection 5-40 mL  5-40 mL IntraVENous 2 times per day Thu Lopez, DO   10 mL at 10/05/22 0801    sodium chloride flush 0.9 % injection 5-40 mL  5-40 mL IntraVENous PRN Thu Lopez, DO        0.9 % sodium chloride infusion   IntraVENous PRN Thu Lopez, DO        ondansetron (ZOFRAN-ODT) disintegrating tablet 4 mg  4 mg Oral Q8H PRN Thu Lopez, DO        Or    ondansetron (ZOFRAN) injection 4 mg  4 mg IntraVENous Q6H PRN Thu Lopez, DO   4 mg at 10/04/22 0609    polyethylene glycol (GLYCOLAX) packet 17 g  17 g Oral Daily PRN Thu Lopez, DO        acetaminophen (TYLENOL) tablet 650 mg  650 mg Oral Q6H PRN Thu Lopez, DO   650 mg at 10/05/22 0354    Or    acetaminophen (TYLENOL) suppository 650 mg  650 mg Rectal Q6H PRN Thu Lopez, DO        metroNIDAZOLE (FLAGYL) tablet 500 mg  500 mg Oral 3 times per day Thu Lopez, DO   500 mg at 10/05/22 1336    saccharomyces boulardii (FLORASTOR) capsule 250 mg  250 mg Oral BID Thu Lopez, DO   250 mg at 10/04/22 1053    morphine injection 2 mg  2 mg IntraVENous Q4H PRN Thu Lopez, DO   2 mg at 10/05/22 1336       OBJECTIVE:  Patient Vitals for the past 8 hrs:   BP Temp Temp src Pulse Resp SpO2   10/05/22 1035 108/69 98 °F (36.7 °C) Oral 69 16 97 %   10/05/22 0741 107/80 97.6 °F (36.4 °C) Oral 69 16 97 %     Temp (24hrs), Av.7 °F (37.1 °C), Min:97.6 °F (36.4 °C), Max:101.7 °F (38.7 °C)    10/05 0701 - 10/05 1900  In: 160 [P.O.:160]  Out: -     Physical Exam:  Constitutional: Well developed, well nourished female in no acute distress, sitting comfortably in the hospital bed. HEENT: Normocephalic and atraumatic. Oropharynx is clear, mucous membranes are moist.  Pupils are equal, round, and reactive to light. Extraocular muscles are intact. Sclerae anicteric. Neck supple without JVD. No thyromegaly present. Lymph node   No palpable submandibular, cervical, supraclavicular, axillary or inguinal lymph nodes. Skin Warm and dry. No bruising and no rash noted. No erythema. No pallor. Respiratory Lungs are clear to auscultation bilaterally without wheezes, rales or rhonchi, normal air exchange without accessory muscle use. CVS Normal rate, regular rhythm and normal S1 and S2.   No murmurs, gallops, or rubs. Abdomen Soft, nontender and nondistended, normoactive bowel sounds. No palpable mass. No hepatosplenomegaly. Neuro Grossly nonfocal with no obvious sensory or motor deficits. MSK Normal range of motion in general.  No edema and no tenderness. Psych Appropriate mood and affect.         Labs:    Recent Results (from the past 24 hour(s))   Comprehensive Metabolic Panel w/ Reflex to MG    Collection Time: 10/05/22  4:01 AM   Result Value Ref Range    Sodium 137 136 - 145 mmol/L    Potassium 3.1 (L) 3.5 - 5.1 mmol/L    Chloride 102 101 - 110 mmol/L    CO2 29 21 - 32 mmol/L    Anion Gap 6 4 - 13 mmol/L    Glucose 87 65 - 100 mg/dL    BUN 3 (L) 6 - 23 MG/DL    Creatinine 0.50 (L) 0.6 - 1.0 MG/DL    Est, Glom Filt Rate >60 >60 ml/min/1.73m2    Calcium 8.2 (L) 8.3 - 10.4 MG/DL    Total Bilirubin 0.4 0.2 - 1.1 MG/DL    ALT 11 (L) 12 - 65 U/L    AST 6 (L) 15 - 37 U/L    Alk Phosphatase 106 50 - 136 U/L    Total Protein 5.3 (L) 6.3 - 8.2 g/dL    Albumin 3.2 (L) 3.5 - 5.0 g/dL    Globulin 2.1 (L) 2.3 - 3.5 g/dL    Albumin/Globulin Ratio 1.5 1.2 - 3.5     Magnesium    Collection Time: 10/05/22  4:01 AM   Result Value Ref Range    Magnesium 1.5 (L) 1.8 - 2.4 mg/dL   CBC with Auto Differential    Collection Time: 10/05/22  4:01 AM   Result Value Ref Range    WBC 9.1 4.3 - 11.1 K/uL    RBC 2.62 (L) 4.05 - 5.2 M/uL    Hemoglobin 7.1 (L) 11.7 - 15.4 g/dL    Hematocrit 23.1 (L) 35.8 - 46.3 %    MCV 88.2 79.6 - 97.8 FL    MCH 27.1 26.1 - 32.9 PG    MCHC 30.7 (L) 31.4 - 35.0 g/dL    RDW 15.8 (H) 11.9 - 14.6 %    Platelets 724 646 - 479 K/uL    MPV 10.1 9.4 - 12.3 FL    nRBC 0.00 0.0 - 0.2 K/uL    Differential Type AUTOMATED      Seg Neutrophils 84 (H) 43 - 78 %    Lymphocytes 8 (L) 13 - 44 %    Monocytes 7 4.0 - 12.0 %    Eosinophils % 0 (L) 0.5 - 7.8 %    Basophils 0 0.0 - 2.0 %    Immature Granulocytes 1 0.0 - 5.0 %    Segs Absolute 7.7 1.7 - 8.2 K/UL    Absolute Lymph # 0.7 0.5 - 4.6 K/UL Absolute Mono # 0.7 0.1 - 1.3 K/UL    Absolute Eos # 0.0 0.0 - 0.8 K/UL    Basophils Absolute 0.0 0.0 - 0.2 K/UL    Absolute Immature Granulocyte 0.1 0.0 - 0.5 K/UL       Imaging:    CT Result (most recent):  CT CHEST ABDOMEN PELVIS W CONTRAST 10/03/2022    Narrative  CT CHEST ABDOMEN AND PELVIS WITH CONTRAST  10/3/2022 5:15 PM    HISTORY:  PE; hx of PE; left chest pain; diffuse abdominal pain. Peritoneal  carcinomatosis. TECHNIQUE: The patient received 100 mL Isovue-370 nonionic IV contrast and axial  images were obtained through the chest, abdomen, and pelvis. Multiplanar  reformatted images were generated. All CT scans at this facility used dose  modulation, interactive reconstruction and/or weight based dosing when  appropriate to reduce radiation dose to as low as reasonably achievable. COMPARISON:  June 22, 2022    CHEST: There are no filling defects within the main or proximal segmental  pulmonary artery suggestive of emboli. The thoracic aorta is well-opacified without dissection. There is no thoracic adenopathy. A right-sided chest port is present with  catheter tip in right atrium. Bilateral pleural effusions are present. There is lower lobe dependent  atelectasis. A few groundglass opacities in the right upper lobe may be areas  of active alveolitis (image 48). ABDOMEN: A percutaneous catheter or drain is present in the right upper  quadrant. A surgical staple line along the outer margin of the stomach is  compatible with a sleeve gastrectomy. Gallbladder: Mucosal enhancement. Pericholecystic fluid. No visible stones. Liver: Mild intrahepatic biliary ductal dilatation. No focal hepatic lesions. Pancreas: Normal.    Spleen: Normal.    Adrenal glands: Normal.    Kidneys: Bilateral ureteral stents are present with proximal loops in the  distended renal pelvis and distal loops in the bladder.   Bilateral significant  hydronephrosis has progressed since June 2022.    Bowel: The appendix is mildly distended. There is diffuse bowel wall thickening  involving the colon and there are several dilated loops small bowel. This  appearance may be caused by serosal tumor implants or colitis. Small volume  ascites is present and there is diffuse heterogeneous enhancement of the omental  fat (image 28) compatible with metastatic omental implants. Retroperitoneum:No adenopathy. Abdominal aorta is normal in caliber. PELVIS:Ascites. Diffuse bladder wall thickening. Hysterectomy. Bones: There are no aggressive osseous lesions. Impression  1. No pulmonary embolism. 2.  Mild biliary ductal dilatation. 3.  Omental tumor implants, ascites and diffuse bowel wall thickening may be  caused by serosal tumor implants or colitis. 4.  Progression of bilateral hydronephrosis. ASSESSMENT:      ICD-10-CM    1. Intractable vomiting with nausea  R11.2       2. Hypokalemia  E87.6          Ms. Angelica Hughes is a 52 y.o. female admitted on 10/3/2022. The primary encounter diagnosis was Intractable vomiting with nausea. A diagnosis of Hypokalemia was also pertinent to this visit. Ms Angelica Hughes has a PMH of hydronephrosis s/p ureteral stents. She is a patient of Dr Sal Sanders and being treated for metastatic carcinoma of possible GI primary with known peritoneal carcinomatosis on diagnosis. She recently completed C9 FOLFOX/Avastin 9/8/22. Of note, Dr Vy Ford attempted debulking surgery in 8/2022 for peritoneal disease but due to adhesions he was unable to debulk. She was recently admitted [facility] 9/12-9/27/22 and had negative EGD but did have abdominal pleurx drain placed 9/26/22 due to recurrent ascites.  She saw Dr Sal Sanders in clinic on 9/29/22 and due to lack of symptomatic response and progressive ascites, she was felt to have disease progression and therefore recommended changing to paclitaxel/ramicirumab which was planned to start later this week pending improvement in performance status. Ms Vivian Arreola presented to ED 10/3/2022 with nausea, vomiting and diarrhea. CT AP showed diffuse wall thickening concerning for colitis vs serosal implants of tumor. She was started on CTX and Flagyl. Of note, scan indicated worsening hydronephrosis with Cr stable at 0.5. UC pending but UA showed leukocytes and WBC but no bacteria. She is feeling well other than nausea. She has not had diarrhea since yesterday. PLAN:    Metastatic carcinoma / peritoneal carcinomatosis / ascites  - Abd Pleurx drain in place - drain per protocol  - Dr Edgar De La Rosa paclitaxel/ramicirumab as outpatient later this week; currently no plans to give inpatient and with possible colitis    Abdominal pain / nausea /vomiting / fever  - PRN antiemetics  - CT with possible colitis - change CTX/flagyl to LVQ/flagyl  - Check for C diff if diarrhea recurs  10/5 Tmax 101.7. Infectious work-up to date negative including BC/UC - continues on Levaquin/Flagyl. No diarrhea reported. Will check ascitic fluid. Hx of hydronephrosis  - Dr Vijay Riddle planning exchange; would like to complete prior to chemotherapy, if able early next week. 10/5 Discussed with Dr Vijay Riddle. Given fevers, will plan stent exchange tomorrow or over the weekend. Hx of DVT   - Continue Eliquis    Anemia  - Monitor, transfuse for Hgb <7    Consult PT/OT  Continue home meds  VTE prophylaxis - on Eliquis    Dispo - too soon to determine. Lab studies and imaging studies were personally reviewed. Pertinent old records were reviewed from [address] [place] [address]. Thank you for allowing us to participate in the care of Ms. Vivian Arreola. SCOTT Orozco - [facility]  [address] [address]  Office : [phone]  Fax : [phone]        Attending Addendum:  I have personally performed a face to face diagnostic evaluation on this patient.  I have reviewed and agree with the care plan as documented above by  Antwan Julio Hali Guo N.P.  My findings are as follows: Patient appears stable, heart rate regular without murmurs, abdomen is non-tender, bowel sounds are positive. 27-year-old patient history of metastatic carcinoma of possible GI origin with known peritoneal carcinomatosis, recently received Avastin-FOLFOX 9/8/2022. Has a Pleurx drain placement for recurrent ascites. On recent evaluation has been recommended to switch therapy to paclitaxel/ramucirumab. She is now admitted through the ED with nausea, vomiting and diarrhea. Contrast showed CT CAP with omental tumor implants, ascites as well as diffuse bowel wall thickening possibly caused by serosal implants versus colitis. Currently being treated with antibiotics including Levaquin and Flagyl. Febrile: f/u cultures, sent ascites fluid for analysis for SBP. Urology for stent change later this week. Supportive  care as above. Remains significantly debilitated.            Cooper Lebron MD  [facility] Hematology/Oncology  [id number] [place] [address]  Office : [phone]  Fax : [phone]

## 2022-10-05 NOTE — PROGRESS NOTES
ACUTE OCCUPATIONAL THERAPY GOALS:   (Developed with and agreed upon by patient and/or caregiver.)  1. Patient will complete lower body bathing and dressing with modified independence and adaptive equipment as needed. 2. Patient will complete toileting with modified independence. 3. Patient will tolerate 30 minutes of OT treatment with 1-2 rest breaks to increase activity tolerance for ADLs. 4. Patient will complete functional transfers with modified independence and adaptive equipment as needed. 5. Patient will complete functional mobility with modified independence and appropriate safety awareness. Timeframe: 7 visits      OCCUPATIONAL THERAPY Initial Assessment, Daily Note, and AM       OT Visit Days: 1  Acknowledge Orders  Time  OT Charge Capture  Rehab Caseload Tracker      Harinder Espinal is a 52 y.o. female   PRIMARY DIAGNOSIS: Abdominal pain  Hypokalemia [E87.6]  Abdominal pain [R10.9]  Intractable vomiting with nausea [R11.2]       Reason for Referral: Generalized Muscle Weakness (M62.81)  Inpatient: Payor: Robert Gill / Plan: [employer] [place] / Product Type: *No Product type* /     ASSESSMENT:     REHAB RECOMMENDATIONS:   Recommendation to date pending progress:  Setting:  Home Health Therapy vs no needs     Equipment:    To Be Determined     ASSESSMENT:  Ms. Walker presents with metastatic carcinoma and abdominal pain. Pt found supine in bed upon arrival. Pt agreeable to therapy after verbal encouragement. Pt performed bed mobility with SBA. Pt donned slip on shoes sitting EOB with supervision. Pt completed functional mobility, standing sink side for grooming task, and toileting with CGA. Pt had decreased safety awareness and required verbal and manual cues to be aware of lines. Pt has flat affect during session. Pt left supine in bed with call bell, needs met, and nurse notified.  Pt is limited by decreased strength and safety awareness which impact ability to be safe and independent with ADL and functional mobility. OT is medically necessary and will address goals and plan of care.       [address] [facility] [address] [id number] AM-PAC 6 Clicks Daily Activity Inpatient Short Form:    AM-PAC Daily Activity Inpatient   How much help for putting on and taking off regular lower body clothing?: A Little  How much help for Bathing?: A Little  How much help for Toileting?: A Little  How much help for putting on and taking off regular upper body clothing?: A Little  How much help for taking care of personal grooming?: A Little  How much help for eating meals?: A Little  AM-PAC Inpatient Daily Activity Raw Score: 18  AM-PAC Inpatient ADL T-Scale Score : 38.66  ADL Inpatient CMS 0-100% Score: 46.65  ADL Inpatient CMS G-Code Modifier : CK           SUBJECTIVE:     Ms. [facility] states, \"I could wash my face\"     Social/Functional Type of Home: House  Receives Help From: Family  ADL Assistance: Independent  Ambulation Assistance: Independent  Transfer Assistance: Independent  Active : Yes    OBJECTIVE:     Inez Snyder / Jazmyn Cough / Loreda Daugherty: IV and [place]    RESTRICTIONS/PRECAUTIONS:       PAIN: VITALS / O2:   Pre Treatment:   Pain Assessment: None - Denies Pain      Post Treatment: Same        Vitals          Oxygen            GROSS EVALUATION: INTACT IMPAIRED   (See Comments)   UE AROM [] []Within functional limits    UE PROM [] []   Strength []  Generalized weakness      Posture / Balance [] Posture: Good  Sitting - Static: Good  Sitting - Dynamic: Good  Standing - Static: Good  Standing - Dynamic: Fair, +   Sensation [x]     Coordination [x]  Within functional limits     Tone [x]       Edema [x]    Activity Tolerance []       Hand Dominance R [x] L []      COGNITION/  PERCEPTION: INTACT IMPAIRED   (See Comments)   Orientation []  Appears oriented during conversation   Vision []     Hearing [x]     Cognition  [] Overall Cognitive Status: Exceptions  Safety Judgement: Decreased awareness of need for safety   Perception []       MOBILITY: I Mod I S SBA CGA Min Mod Max Total  NT x2 Comments:   Bed Mobility    Rolling [] [] [] [] [] [] [] [] [] [x] []    Supine to Sit [] [] [] [x] [] [] [] [] [] [] []    Scooting [] [] [] [x] [] [] [] [] [] [] []    Sit to Supine [] [] [] [x] [] [] [] [] [] [] []    Transfers    Sit to Stand [] [] [] [] [x] [] [] [] [] [] []    Bed to Chair [] [] [] [] [] [] [] [] [] [x] []    Stand to Sit [] [] [] [] [x] [] [] [] [] [] []    Tub/Shower [] [] [] [] [] [] [] [] [] [x] []     Toilet [] [] [] [] [x] [] [] [] [] [] []  On low toilet     [] [] [] [] [] [] [] [] [] [] []    I=Independent, Mod I=Modified Independent, S=Supervision/Setup, SBA=Standby Assistance, CGA=Contact Guard Assistance, Min=Minimal Assistance, Mod=Moderate Assistance, Max=Maximal Assistance, Total=Total Assistance, NT=Not Tested    ACTIVITIES OF DAILY LIVING: I Mod I S SBA CGA Min Mod Max Total NT Comments   BASIC ADLs:              Upper Body Bathing  [] [] [] [] [] [] [] [] [] [x]    Lower Body Bathing [] [] [] [] [] [] [] [] [] [x]    Toileting [] [] [] [] [x] [] [] [] [] []    Upper Body Dressing [] [] [] [] [] [] [] [] [] [x]    Lower Body Dressing [] [] [x] [] [] [] [] [] [] [] Donning slippers EOB    Feeding [] [] [] [] [] [] [] [] [] [x]    Grooming [] [] [] [] [x] [] [] [] [] [] Washing face standing at the sink    Personal Device Care [] [] [] [] [] [] [] [] [] [x]    Functional Mobility [] [] [] [] [x] [] [] [] [] []    I=Independent, Mod I=Modified Independent, S=Supervision/Setup, SBA=Standby Assistance, CGA=Contact Guard Assistance, Min=Minimal Assistance, Mod=Moderate Assistance, Max=Maximal Assistance, Total=Total Assistance, NT=Not Tested    PLAN:   FREQUENCY/DURATION   OT Plan of Care: 3 times/week for duration of hospital stay or until stated goals are met, whichever comes first.    PROBLEM LIST:   (Skilled intervention is medically necessary to address:)  Decreased ADL/Functional Activities  Decreased Balance  Decreased Safety Awareness  Decreased Strength   INTERVENTIONS PLANNED:  (Benefits and precautions of occupational therapy have been discussed with the patient.)  Self Care Training  Therapeutic Activity  Therapeutic Exercise/HEP  Neuromuscular Re-education  Manual Therapy  Education         TREATMENT:     EVALUATION: LOW COMPLEXITY: (Untimed Charge)    TREATMENT:   Self Care (08 minutes): Patient participated in toileting and grooming ADLs in unsupported sitting and standing with minimal manual cueing to increase independence, decrease assistance required, and increase activity tolerance. Patient also participated in functional mobility and functional transfer training to increase independence, decrease assistance required, and increase activity tolerance.      TREATMENT GRID:  N/A    AFTER TREATMENT PRECAUTIONS: Bed, Bed/Chair Locked, Call light within reach, and Needs within reach    INTERDISCIPLINARY COLLABORATION:  RN/ PCT, OT/ DURAN, and OTS    EDUCATION:  Education Given To: Patient  Education Provided: Role of Therapy  Education Method: Verbal  Education Outcome: Verbalized understanding    TOTAL TREATMENT DURATION AND TIME:  Time In: 1108  Time Out: [address]  Minutes: [address] [address], OTS

## 2022-10-06 ENCOUNTER — HOSPITAL ENCOUNTER (OUTPATIENT)
Dept: INFUSION THERAPY | Age: 47
End: 2022-10-06

## 2022-10-06 ENCOUNTER — ANESTHESIA (OUTPATIENT)
Dept: SURGERY | Age: 47
DRG: 988 | End: 2022-10-06
Payer: COMMERCIAL

## 2022-10-06 ENCOUNTER — APPOINTMENT (OUTPATIENT)
Dept: GENERAL RADIOLOGY | Age: 47
DRG: 988 | End: 2022-10-06
Payer: COMMERCIAL

## 2022-10-06 PROBLEM — R50.9 FEVER: Status: ACTIVE | Noted: 2022-10-06

## 2022-10-06 LAB
ABO + RH BLD: NORMAL
ALBUMIN SERPL-MCNC: 2.8 G/DL (ref 3.5–5)
ALBUMIN/GLOB SERPL: 1.2 {RATIO} (ref 1.2–3.5)
ALP SERPL-CCNC: 97 U/L (ref 50–136)
ALT SERPL-CCNC: 10 U/L (ref 12–65)
ANION GAP SERPL CALC-SCNC: 7 MMOL/L (ref 4–13)
AST SERPL-CCNC: 6 U/L (ref 15–37)
BACTERIA SPEC CULT: NORMAL
BACTERIA SPEC CULT: NORMAL
BASOPHILS # BLD: 0 K/UL (ref 0–0.2)
BASOPHILS NFR BLD: 0 % (ref 0–2)
BILIRUB SERPL-MCNC: 0.4 MG/DL (ref 0.2–1.1)
BLOOD GROUP ANTIBODIES SERPL: NORMAL
BUN SERPL-MCNC: 3 MG/DL (ref 6–23)
CALCIUM SERPL-MCNC: 8.1 MG/DL (ref 8.3–10.4)
CHLORIDE SERPL-SCNC: 101 MMOL/L (ref 101–110)
CO2 SERPL-SCNC: 31 MMOL/L (ref 21–32)
CREAT SERPL-MCNC: 0.4 MG/DL (ref 0.6–1)
DIFFERENTIAL METHOD BLD: ABNORMAL
EOSINOPHIL # BLD: 0 K/UL (ref 0–0.8)
EOSINOPHIL NFR BLD: 0 % (ref 0.5–7.8)
ERYTHROCYTE [DISTWIDTH] IN BLOOD BY AUTOMATED COUNT: 15.9 % (ref 11.9–14.6)
GLOBULIN SER CALC-MCNC: 2.4 G/DL (ref 2.3–3.5)
GLUCOSE SERPL-MCNC: 91 MG/DL (ref 65–100)
HCT VFR BLD AUTO: 24.3 % (ref 35.8–46.3)
HGB BLD-MCNC: 7.7 G/DL (ref 11.7–15.4)
IMM GRANULOCYTES # BLD AUTO: 0.1 K/UL (ref 0–0.5)
IMM GRANULOCYTES NFR BLD AUTO: 1 % (ref 0–5)
LYMPHOCYTES # BLD: 0.8 K/UL (ref 0.5–4.6)
LYMPHOCYTES NFR BLD: 9 % (ref 13–44)
MAGNESIUM SERPL-MCNC: 1.7 MG/DL (ref 1.8–2.4)
MCH RBC QN AUTO: 27.7 PG (ref 26.1–32.9)
MCHC RBC AUTO-ENTMCNC: 31.7 G/DL (ref 31.4–35)
MCV RBC AUTO: 87.4 FL (ref 79.6–97.8)
MONOCYTES # BLD: 0.8 K/UL (ref 0.1–1.3)
MONOCYTES NFR BLD: 9 % (ref 4–12)
NEUTS SEG # BLD: 7.2 K/UL (ref 1.7–8.2)
NEUTS SEG NFR BLD: 81 % (ref 43–78)
NRBC # BLD: 0 K/UL (ref 0–0.2)
PLATELET # BLD AUTO: 242 K/UL (ref 150–450)
PMV BLD AUTO: 10.4 FL (ref 9.4–12.3)
POTASSIUM SERPL-SCNC: 3.1 MMOL/L (ref 3.5–5.1)
PROT SERPL-MCNC: 5.2 G/DL (ref 6.3–8.2)
RBC # BLD AUTO: 2.78 M/UL (ref 4.05–5.2)
SERVICE CMNT-IMP: NORMAL
SODIUM SERPL-SCNC: 139 MMOL/L (ref 136–145)
SPECIMEN EXP DATE BLD: NORMAL
WBC # BLD AUTO: 8.8 K/UL (ref 4.3–11.1)

## 2022-10-06 PROCEDURE — BT141ZZ FLUOROSCOPY OF KIDNEYS, URETERS AND BLADDER USING LOW OSMOLAR CONTRAST: ICD-10-PCS | Performed by: UROLOGY

## 2022-10-06 PROCEDURE — C2617 STENT, NON-COR, TEM W/O DEL: HCPCS | Performed by: UROLOGY

## 2022-10-06 PROCEDURE — 6360000002 HC RX W HCPCS: Performed by: FAMILY MEDICINE

## 2022-10-06 PROCEDURE — 6370000000 HC RX 637 (ALT 250 FOR IP): Performed by: FAMILY MEDICINE

## 2022-10-06 PROCEDURE — 3700000000 HC ANESTHESIA ATTENDED CARE: Performed by: UROLOGY

## 2022-10-06 PROCEDURE — 3600000004 HC SURGERY LEVEL 4 BASE: Performed by: UROLOGY

## 2022-10-06 PROCEDURE — 2580000003 HC RX 258: Performed by: ANESTHESIOLOGY

## 2022-10-06 PROCEDURE — 6360000002 HC RX W HCPCS: Performed by: NURSE PRACTITIONER

## 2022-10-06 PROCEDURE — 85025 COMPLETE CBC W/AUTO DIFF WBC: CPT

## 2022-10-06 PROCEDURE — 7100000001 HC PACU RECOVERY - ADDTL 15 MIN: Performed by: UROLOGY

## 2022-10-06 PROCEDURE — 99232 SBSQ HOSP IP/OBS MODERATE 35: CPT | Performed by: INTERNAL MEDICINE

## 2022-10-06 PROCEDURE — 2709999900 HC NON-CHARGEABLE SUPPLY: Performed by: UROLOGY

## 2022-10-06 PROCEDURE — 2500000003 HC RX 250 WO HCPCS: Performed by: REGISTERED NURSE

## 2022-10-06 PROCEDURE — 2580000003 HC RX 258: Performed by: REGISTERED NURSE

## 2022-10-06 PROCEDURE — 83735 ASSAY OF MAGNESIUM: CPT

## 2022-10-06 PROCEDURE — C1758 CATHETER, URETERAL: HCPCS | Performed by: UROLOGY

## 2022-10-06 PROCEDURE — 52332 CYSTOSCOPY AND TREATMENT: CPT | Performed by: UROLOGY

## 2022-10-06 PROCEDURE — 6360000002 HC RX W HCPCS: Performed by: ANESTHESIOLOGY

## 2022-10-06 PROCEDURE — 2580000003 HC RX 258: Performed by: FAMILY MEDICINE

## 2022-10-06 PROCEDURE — 36591 DRAW BLOOD OFF VENOUS DEVICE: CPT

## 2022-10-06 PROCEDURE — 86901 BLOOD TYPING SEROLOGIC RH(D): CPT

## 2022-10-06 PROCEDURE — 0TP98DZ REMOVAL OF INTRALUMINAL DEVICE FROM URETER, VIA NATURAL OR ARTIFICIAL OPENING ENDOSCOPIC: ICD-10-PCS | Performed by: UROLOGY

## 2022-10-06 PROCEDURE — 0T768DZ DILATION OF RIGHT URETER WITH INTRALUMINAL DEVICE, VIA NATURAL OR ARTIFICIAL OPENING ENDOSCOPIC: ICD-10-PCS | Performed by: UROLOGY

## 2022-10-06 PROCEDURE — 3600000014 HC SURGERY LEVEL 4 ADDTL 15MIN: Performed by: UROLOGY

## 2022-10-06 PROCEDURE — 2580000003 HC RX 258: Performed by: NURSE PRACTITIONER

## 2022-10-06 PROCEDURE — 80053 COMPREHEN METABOLIC PANEL: CPT

## 2022-10-06 PROCEDURE — 6370000000 HC RX 637 (ALT 250 FOR IP): Performed by: ANESTHESIOLOGY

## 2022-10-06 PROCEDURE — 7100000000 HC PACU RECOVERY - FIRST 15 MIN: Performed by: UROLOGY

## 2022-10-06 PROCEDURE — 1100000000 HC RM PRIVATE

## 2022-10-06 PROCEDURE — 74420 UROGRAPHY RTRGR +-KUB: CPT | Performed by: UROLOGY

## 2022-10-06 PROCEDURE — 6360000002 HC RX W HCPCS: Performed by: INTERNAL MEDICINE

## 2022-10-06 PROCEDURE — 3700000001 HC ADD 15 MINUTES (ANESTHESIA): Performed by: UROLOGY

## 2022-10-06 PROCEDURE — 6360000002 HC RX W HCPCS: Performed by: REGISTERED NURSE

## 2022-10-06 DEVICE — URETERAL STENT
Type: IMPLANTABLE DEVICE | Site: URETER | Status: FUNCTIONAL
Brand: PERCUFLEX™ PLUS

## 2022-10-06 RX ORDER — SODIUM CHLORIDE, SODIUM LACTATE, POTASSIUM CHLORIDE, CALCIUM CHLORIDE 600; 310; 30; 20 MG/100ML; MG/100ML; MG/100ML; MG/100ML
INJECTION, SOLUTION INTRAVENOUS CONTINUOUS
Status: DISCONTINUED | OUTPATIENT
Start: 2022-10-06 | End: 2022-10-06 | Stop reason: HOSPADM

## 2022-10-06 RX ORDER — PROPOFOL 10 MG/ML
INJECTION, EMULSION INTRAVENOUS PRN
Status: DISCONTINUED | OUTPATIENT
Start: 2022-10-06 | End: 2022-10-06 | Stop reason: SDUPTHER

## 2022-10-06 RX ORDER — SODIUM CHLORIDE 9 MG/ML
INJECTION, SOLUTION INTRAVENOUS PRN
Status: DISCONTINUED | OUTPATIENT
Start: 2022-10-06 | End: 2022-10-06 | Stop reason: HOSPADM

## 2022-10-06 RX ORDER — EPHEDRINE SULFATE/0.9% NACL/PF 50 MG/5 ML
SYRINGE (ML) INTRAVENOUS PRN
Status: DISCONTINUED | OUTPATIENT
Start: 2022-10-06 | End: 2022-10-06 | Stop reason: SDUPTHER

## 2022-10-06 RX ORDER — HYDROMORPHONE HYDROCHLORIDE 2 MG/ML
0.5 INJECTION, SOLUTION INTRAMUSCULAR; INTRAVENOUS; SUBCUTANEOUS EVERY 5 MIN PRN
Status: DISCONTINUED | OUTPATIENT
Start: 2022-10-06 | End: 2022-10-06 | Stop reason: HOSPADM

## 2022-10-06 RX ORDER — SODIUM CHLORIDE 0.9 % (FLUSH) 0.9 %
10 SYRINGE (ML) INJECTION PRN
Status: CANCELLED | OUTPATIENT
Start: 2022-10-06

## 2022-10-06 RX ORDER — SODIUM CHLORIDE 0.9 % (FLUSH) 0.9 %
5-40 SYRINGE (ML) INJECTION PRN
Status: DISCONTINUED | OUTPATIENT
Start: 2022-10-06 | End: 2022-10-06 | Stop reason: HOSPADM

## 2022-10-06 RX ORDER — ONDANSETRON 2 MG/ML
INJECTION INTRAMUSCULAR; INTRAVENOUS PRN
Status: DISCONTINUED | OUTPATIENT
Start: 2022-10-06 | End: 2022-10-06 | Stop reason: SDUPTHER

## 2022-10-06 RX ORDER — LIDOCAINE HYDROCHLORIDE 10 MG/ML
1 INJECTION, SOLUTION INFILTRATION; PERINEURAL
Status: DISCONTINUED | OUTPATIENT
Start: 2022-10-06 | End: 2022-10-06 | Stop reason: HOSPADM

## 2022-10-06 RX ORDER — ACETAMINOPHEN 500 MG
1000 TABLET ORAL ONCE
Status: COMPLETED | OUTPATIENT
Start: 2022-10-06 | End: 2022-10-06

## 2022-10-06 RX ORDER — DEXAMETHASONE SODIUM PHOSPHATE 4 MG/ML
INJECTION, SOLUTION INTRA-ARTICULAR; INTRALESIONAL; INTRAMUSCULAR; INTRAVENOUS; SOFT TISSUE PRN
Status: DISCONTINUED | OUTPATIENT
Start: 2022-10-06 | End: 2022-10-06 | Stop reason: SDUPTHER

## 2022-10-06 RX ORDER — SODIUM CHLORIDE, SODIUM LACTATE, POTASSIUM CHLORIDE, CALCIUM CHLORIDE 600; 310; 30; 20 MG/100ML; MG/100ML; MG/100ML; MG/100ML
INJECTION, SOLUTION INTRAVENOUS CONTINUOUS
Status: DISCONTINUED | OUTPATIENT
Start: 2022-10-06 | End: 2022-10-08 | Stop reason: HOSPADM

## 2022-10-06 RX ORDER — OXYCODONE HYDROCHLORIDE 5 MG/1
5 TABLET ORAL
Status: DISCONTINUED | OUTPATIENT
Start: 2022-10-06 | End: 2022-10-06 | Stop reason: HOSPADM

## 2022-10-06 RX ORDER — DEXTROSE MONOHYDRATE 100 MG/ML
INJECTION, SOLUTION INTRAVENOUS CONTINUOUS PRN
Status: DISCONTINUED | OUTPATIENT
Start: 2022-10-06 | End: 2022-10-06 | Stop reason: HOSPADM

## 2022-10-06 RX ORDER — MIDAZOLAM HYDROCHLORIDE 1 MG/ML
INJECTION INTRAMUSCULAR; INTRAVENOUS PRN
Status: DISCONTINUED | OUTPATIENT
Start: 2022-10-06 | End: 2022-10-06 | Stop reason: SDUPTHER

## 2022-10-06 RX ORDER — DIPHENHYDRAMINE HYDROCHLORIDE 50 MG/ML
12.5 INJECTION INTRAMUSCULAR; INTRAVENOUS
Status: DISCONTINUED | OUTPATIENT
Start: 2022-10-06 | End: 2022-10-06 | Stop reason: HOSPADM

## 2022-10-06 RX ORDER — PROCHLORPERAZINE EDISYLATE 5 MG/ML
5 INJECTION INTRAMUSCULAR; INTRAVENOUS
Status: DISCONTINUED | OUTPATIENT
Start: 2022-10-06 | End: 2022-10-06 | Stop reason: HOSPADM

## 2022-10-06 RX ORDER — SODIUM CHLORIDE 0.9 % (FLUSH) 0.9 %
5-40 SYRINGE (ML) INJECTION EVERY 12 HOURS SCHEDULED
Status: DISCONTINUED | OUTPATIENT
Start: 2022-10-06 | End: 2022-10-06 | Stop reason: HOSPADM

## 2022-10-06 RX ORDER — MIDAZOLAM HYDROCHLORIDE 2 MG/2ML
2 INJECTION, SOLUTION INTRAMUSCULAR; INTRAVENOUS
Status: DISCONTINUED | OUTPATIENT
Start: 2022-10-06 | End: 2022-10-06 | Stop reason: HOSPADM

## 2022-10-06 RX ORDER — LIDOCAINE HYDROCHLORIDE 20 MG/ML
INJECTION, SOLUTION EPIDURAL; INFILTRATION; INTRACAUDAL; PERINEURAL PRN
Status: DISCONTINUED | OUTPATIENT
Start: 2022-10-06 | End: 2022-10-06 | Stop reason: SDUPTHER

## 2022-10-06 RX ORDER — POTASSIUM CHLORIDE 29.8 MG/ML
20 INJECTION INTRAVENOUS
Status: COMPLETED | OUTPATIENT
Start: 2022-10-06 | End: 2022-10-06

## 2022-10-06 RX ORDER — ONDANSETRON 2 MG/ML
4 INJECTION INTRAMUSCULAR; INTRAVENOUS
Status: DISCONTINUED | OUTPATIENT
Start: 2022-10-06 | End: 2022-10-06 | Stop reason: HOSPADM

## 2022-10-06 RX ORDER — APREPITANT 40 MG/1
40 CAPSULE ORAL ONCE
Status: COMPLETED | OUTPATIENT
Start: 2022-10-06 | End: 2022-10-06

## 2022-10-06 RX ADMIN — PROCHLORPERAZINE EDISYLATE 10 MG: 5 INJECTION INTRAMUSCULAR; INTRAVENOUS at 08:30

## 2022-10-06 RX ADMIN — PHENYLEPHRINE HYDROCHLORIDE 100 MCG: 10 INJECTION INTRAVENOUS at 16:47

## 2022-10-06 RX ADMIN — LIDOCAINE HYDROCHLORIDE 60 MG: 20 INJECTION, SOLUTION EPIDURAL; INFILTRATION; INTRACAUDAL; PERINEURAL at 16:20

## 2022-10-06 RX ADMIN — SODIUM CHLORIDE, PRESERVATIVE FREE 10 ML: 5 INJECTION INTRAVENOUS at 11:01

## 2022-10-06 RX ADMIN — MORPHINE SULFATE 2 MG: 2 INJECTION, SOLUTION INTRAMUSCULAR; INTRAVENOUS at 19:07

## 2022-10-06 RX ADMIN — POTASSIUM CHLORIDE 20 MEQ: 400 INJECTION, SOLUTION INTRAVENOUS at 08:31

## 2022-10-06 RX ADMIN — PHENYLEPHRINE HYDROCHLORIDE 100 MCG: 10 INJECTION INTRAVENOUS at 16:53

## 2022-10-06 RX ADMIN — SODIUM CHLORIDE, SODIUM LACTATE, POTASSIUM CHLORIDE, AND CALCIUM CHLORIDE: 600; 310; 30; 20 INJECTION, SOLUTION INTRAVENOUS at 15:37

## 2022-10-06 RX ADMIN — PANTOPRAZOLE SODIUM 40 MG: 40 TABLET, DELAYED RELEASE ORAL at 21:14

## 2022-10-06 RX ADMIN — PROPOFOL 110 MG: 10 INJECTION, EMULSION INTRAVENOUS at 16:20

## 2022-10-06 RX ADMIN — HYDROMORPHONE HYDROCHLORIDE 0.5 MG: 2 INJECTION, SOLUTION INTRAMUSCULAR; INTRAVENOUS; SUBCUTANEOUS at 17:41

## 2022-10-06 RX ADMIN — SODIUM CHLORIDE, PRESERVATIVE FREE 10 ML: 5 INJECTION INTRAVENOUS at 21:15

## 2022-10-06 RX ADMIN — METOCLOPRAMIDE 5 MG: 10 TABLET ORAL at 08:30

## 2022-10-06 RX ADMIN — PHENYLEPHRINE HYDROCHLORIDE 50 MCG: 10 INJECTION INTRAVENOUS at 16:33

## 2022-10-06 RX ADMIN — DEXAMETHASONE SODIUM PHOSPHATE 10 MG: 4 INJECTION, SOLUTION INTRAMUSCULAR; INTRAVENOUS at 16:30

## 2022-10-06 RX ADMIN — POTASSIUM CHLORIDE 20 MEQ: 400 INJECTION, SOLUTION INTRAVENOUS at 10:03

## 2022-10-06 RX ADMIN — APREPITANT 40 MG: 40 CAPSULE ORAL at 14:23

## 2022-10-06 RX ADMIN — METRONIDAZOLE 500 MG: 500 TABLET ORAL at 21:14

## 2022-10-06 RX ADMIN — METRONIDAZOLE 500 MG: 500 TABLET ORAL at 14:23

## 2022-10-06 RX ADMIN — Medication 5 MG: at 16:40

## 2022-10-06 RX ADMIN — PHENYLEPHRINE HYDROCHLORIDE 50 MCG: 10 INJECTION INTRAVENOUS at 16:31

## 2022-10-06 RX ADMIN — OXYCODONE HYDROCHLORIDE 5 MG: 5 TABLET ORAL at 11:00

## 2022-10-06 RX ADMIN — ONDANSETRON 4 MG: 2 INJECTION INTRAMUSCULAR; INTRAVENOUS at 16:45

## 2022-10-06 RX ADMIN — HYDROMORPHONE HYDROCHLORIDE 0.5 MG: 2 INJECTION, SOLUTION INTRAMUSCULAR; INTRAVENOUS; SUBCUTANEOUS at 17:50

## 2022-10-06 RX ADMIN — SUCRALFATE 1 G: 1 TABLET ORAL at 08:30

## 2022-10-06 RX ADMIN — MIDAZOLAM 2 MG: 1 INJECTION INTRAMUSCULAR; INTRAVENOUS at 16:16

## 2022-10-06 RX ADMIN — PHENYLEPHRINE HYDROCHLORIDE 100 MCG: 10 INJECTION INTRAVENOUS at 16:40

## 2022-10-06 RX ADMIN — CEFTRIAXONE 2000 MG: 2 INJECTION, POWDER, FOR SOLUTION INTRAMUSCULAR; INTRAVENOUS at 13:30

## 2022-10-06 RX ADMIN — HYDROMORPHONE HYDROCHLORIDE 0.5 MG: 2 INJECTION, SOLUTION INTRAMUSCULAR; INTRAVENOUS; SUBCUTANEOUS at 17:32

## 2022-10-06 RX ADMIN — SUCRALFATE 1 G: 1 TABLET ORAL at 10:04

## 2022-10-06 RX ADMIN — MORPHINE SULFATE 2 MG: 2 INJECTION, SOLUTION INTRAMUSCULAR; INTRAVENOUS at 08:31

## 2022-10-06 RX ADMIN — ACETAMINOPHEN 1000 MG: 500 TABLET, FILM COATED ORAL at 14:10

## 2022-10-06 RX ADMIN — PANTOPRAZOLE SODIUM 40 MG: 40 TABLET, DELAYED RELEASE ORAL at 08:31

## 2022-10-06 RX ADMIN — METRONIDAZOLE 500 MG: 500 TABLET ORAL at 06:23

## 2022-10-06 RX ADMIN — PHENYLEPHRINE HYDROCHLORIDE 100 MCG: 10 INJECTION INTRAVENOUS at 16:37

## 2022-10-06 ASSESSMENT — PAIN DESCRIPTION - LOCATION
LOCATION: VAGINA
LOCATION: ABDOMEN
LOCATION: VAGINA
LOCATION: VAGINA

## 2022-10-06 ASSESSMENT — PAIN - FUNCTIONAL ASSESSMENT
PAIN_FUNCTIONAL_ASSESSMENT: 0-10
PAIN_FUNCTIONAL_ASSESSMENT: PREVENTS OR INTERFERES SOME ACTIVE ACTIVITIES AND ADLS

## 2022-10-06 ASSESSMENT — PAIN DESCRIPTION - DESCRIPTORS
DESCRIPTORS: ACHING
DESCRIPTORS: THROBBING

## 2022-10-06 ASSESSMENT — PAIN SCALES - GENERAL
PAINLEVEL_OUTOF10: 8
PAINLEVEL_OUTOF10: 5
PAINLEVEL_OUTOF10: 10
PAINLEVEL_OUTOF10: 8

## 2022-10-06 ASSESSMENT — PAIN DESCRIPTION - PAIN TYPE: TYPE: CHRONIC PAIN

## 2022-10-06 ASSESSMENT — PAIN DESCRIPTION - ORIENTATION: ORIENTATION: MID

## 2022-10-06 ASSESSMENT — PAIN DESCRIPTION - ONSET: ONSET: AWAKENED FROM SLEEP

## 2022-10-06 ASSESSMENT — PAIN DESCRIPTION - FREQUENCY: FREQUENCY: INTERMITTENT

## 2022-10-06 NOTE — FLOWSHEET NOTE
Received call from Pre-Op department stating that patient's necklace needed to be retrieved. Student nurse, Quynh Poe, collected necklace.  Patient's necklace given to Divya Montaño

## 2022-10-06 NOTE — PERIOP NOTE
TRANSFER - IN REPORT:    [facility] [employer]  being received from [id number] [phone] for ordered procedure      Report consisted of patient's Situation, Background, Assessment and   Recommendations(SBAR). Information from the following report(s) Surgery Report, MAR, Recent Results, Pre Procedure Checklist, and Procedure Verification was reviewed with the receiving nurse. Opportunity for questions and clarification was provided. Assessment completed upon patient's arrival to unit and care assumed.

## 2022-10-06 NOTE — PROGRESS NOTES
END OF SHIFT SUMMARY:    Significant vitals this shift:  ***  Significant labs this shift:  ***  Tests performed this shift:  ***  Orders to be followed up on:  ***  Blood products given this shift:  ***  Additional events this shift:   ***    I/Os:  +/- this shift: ***  10/06 0701 - 10/06 1900  In: 600 [I.V.:600]  Out: 0   Occurrences this Shift:  Urine ***, BM ***, Emesis ***    Yakelin Beckwith RN

## 2022-10-06 NOTE — PERIOP NOTE
TRANSFER - OUT REPORT:    Verbal report given to [facility], RN on [employer]  being transferred to [address] [place] [address] for routine post-op       Report consisted of patients Situation, Background, Assessment and   Recommendations(SBAR). Information from the following report(s) Nurse Handoff Report, Surgery Report, Intake/Output, MAR, Cardiac Rhythm SR, and Neuro Assessment was reviewed with the receiving nurse. Lines:   Single Lumen Implantable Port Right Subclavian (Active)   Port A Cath Status Accessed 10/03/22 2359   Criteria for Appropriate Use Limited/no vessel suitable for conventional peripheral access 10/06/22 1715   Site Assessment Clean, dry & intact 10/06/22 [address] Connections checked and tightened 10/06/22 1715   Alcohol Cap Used Yes 10/06/22 0739   Date of Last Dressing Change 10/03/22 10/06/22 0739   Dressing Type Transparent 10/06/22 1715   Dressing Status Clean, dry & intact 10/06/22 1715   Dressing Intervention New 10/03/22 2359   Date Accessed  10/03/22 10/06/22 0739   Access Attempts  1 10/03/22 1614   Access Needle Gauge 20 G 10/03/22 2359   Access Needle Length 0.75 inches 10/03/22 2359   Accessed By: Montana Varela RN 10/03/22 1614   Single Lumen Status Infusing 10/06/22 1715       Peripheral IV 10/03/22 Left Antecubital (Active)   Site Assessment Clean, dry & intact 10/06/22 1715   Line Status Capped 10/06/22 [address] Connections checked and tightened 10/06/22 1715   Phlebitis Assessment No symptoms 10/06/22 1715   Infiltration Assessment 0 10/06/22 1715   Alcohol Cap Used Yes 10/06/22 0739   Dressing Status Clean, dry & intact 10/06/22 1715        Opportunity for questions and clarification was provided. Patient transported with:   O2 @ 2 liters  Tech    VTE prophylaxis orders have been written for [employer].

## 2022-10-06 NOTE — PROGRESS NOTES
Her H&P for this admission on 10/4/22 was reviewed. No significant changes. Pt is improving. Discussion with Oncology team--they prefer we go ahead and change stents so they can resume chemotherapy sooner. Risk and benefits discussed with patient again today. H&P Update: The patient's last History and Physical was reviewed, and I examined the patient today. There are no changes. The surgical procedure and site were confirmed by the patient and me. PE:  NAD  Heart- Reg, normal perfusion  Pulmonary- clear, normal respiratory effort  Abdomen- Soft, ND     Plan: The risks, benefits, and alternative treatment options were again discussed with the patient. The patient understands and wants to proceed with the procedure-- cystoscopy with bilateral stent exchange.

## 2022-10-06 NOTE — ANESTHESIA POSTPROCEDURE EVALUATION
Department of Anesthesiology  Postprocedure Note    Patient: Cristiana Michaud  MRN: [id number]  YOB: 1975  Date of evaluation: 10/6/2022      Procedure Summary     Date: 10/06/22 Room / Location: 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 Dianne Phase I: Dianne Score: 8    Dianne Phase II: Dianne Score: 8      Anesthesia Post Evaluation    Patient location during evaluation: PACU  Patient participation: complete - patient participated  Level of consciousness: awake and alert  Airway patency: patent  Nausea: well controlled. Complications: no  Cardiovascular status: acceptable.   Respiratory status: acceptable  Hydration status: stable

## 2022-10-06 NOTE — PROGRESS NOTES
[facility] Hematology & Oncology        Inpatient Hematology / Oncology Progress Note    Reason for Admission:  Hypokalemia [E87.6]  Abdominal pain [R10.9]  Intractable vomiting with nausea [R11.2]    24 Hour Events:  VSS, Afeb  BC NGTD  Ucx - MWF  Ascites fluid culture - NGTD; increased PMN on fluid - concern for SBP  Feeling improved  No reported diarrhea  Stent exchange planned for today with Elvira        ROS:  Constitutional: Negative for fever, chills, weakness, malaise, fatigue. CV: Negative for chest pain, palpitations, edema. Respiratory: Negative for dyspnea, cough, wheezing. GI: Negative for nausea, abdominal pain, diarrhea. 10 point review of systems is otherwise negative with the exception of the elements mentioned above in the HPI.       No Known Allergies  Past Medical History:   Diagnosis Date    Anemia     -- not a problem since hyst    Colon cancer ([facility] [address] dx 2022     plans for chemo--- followed by dr Dennie Kelch COVID-19 2020    no hospitalization    GERD (gastroesophageal reflux disease)     managed with med    History of colonoscopy 2018    Dr. Dang Khan, nl (see media note), R     Hx of blood clots 2022    per pt \"small clot on lung identified by CT scan\"  CT Scan impression:- Nonobstructive pulmonary filling defect involving Left Lower Lobe     Hypotension     asymptomatic    Obstruction of fallopian tube     per pt has \"1 good tube\"    Peritoneal carcinomatosis ([facility] [address]     Weight loss     80lbs weight loss after gastric sleeve     Past Surgical History:   Procedure Laterality Date     SECTION      CYSTOSCOPY Bilateral 2022    CYSTOSCOPY BILATERAL RETROGRADE PYELOGRAM performed by Bryon Burkitt, MD at [address] Bilateral 2022    BILATERAL CYSTOSCOPY URETERAL STENT EXCHANGE performed by Bryon Burkitt, MD at [address]  2014    gastric sleeve- Choudhari    HYSTERECTOMY (CERVIX STATUS UNKNOWN) 2018    HYSTERECTOMY (CERVIX STATUS UNKNOWN)  07/09/2020    TLH w/ Bilateral salpingectomy and left oophorectomy    IR PERC CATH PLEURAL DRAIN W/IMAG  9/26/2022    IR PERC CATH PLEURAL DRAIN W/IMAG 9/26/2022 [facility]    IR PORT PLACEMENT EQUAL OR GREATER THAN 5 YEARS  2/28/2022    IR PORT PLACEMENT EQUAL OR GREATER THAN 5 YEARS  2/28/2022    IR PORT PLACEMENT EQUAL OR GREATER THAN 5 YEARS 2/28/2022 [facility]    LAPAROTOMY N/A 8/17/2022    EXPLORATORY LAPAROTOMY/ ENTEROENTEROSTOMY performed by Gabi Phoenix MD at [facility]. [address]  age James Care 21s\"    also \"unblocked her FT\"    TONSILLECTOMY      UPPER GASTROINTESTINAL ENDOSCOPY      with dilation    UPPER GASTROINTESTINAL ENDOSCOPY N/A 9/13/2022    EGD ESOPHAGOGASTRODUODENOSCOPY OFL 17 To IR after recovery for Para performed by Libra Najera MD at [facility] ENDOSCOPY    UROLOGICAL SURGERY Bilateral 03/15/2022    cysto     Family History   Problem Relation Age of Onset    Heart Disease Maternal Grandmother     Hypertension Maternal Grandmother     Heart Disease Maternal Grandfather     Hypertension Maternal Grandfather     Pulmonary Embolism Neg Hx     Colon Cancer Neg Hx     Deep Vein Thrombosis Neg Hx     Ovarian Cancer Neg Hx     Prostate Cancer Neg Hx     Breast Cancer Neg Hx      Social History     Socioeconomic History    Marital status: Single     Spouse name: Not on file    Number of children: Not on file    Years of education: Not on file    Highest education level: Not on file   Occupational History    Not on file   Tobacco Use    Smoking status: Never    Smokeless tobacco: Never   Vaping Use    Vaping Use: Never used   Substance and Sexual Activity    Alcohol use: Not Currently    Drug use: No    Sexual activity: Not on file   Other Topics Concern    Not on file   Social History Narrative    1. Use large speculum. 2.  sister, Garret Hager #[id number] and mom is Si Massed #[id number] (both Kofoed's pts)  3.   PCP  Dr. Lyly Parnell Victorino Winston ([facility] GI Dr. Lorelie Brittle Perkins-2018 normal EGD     Social Determinants of Health     Financial Resource Strain: Not on file   Food Insecurity: Not on file   Transportation Needs: Not on file   Physical Activity: Not on file   Stress: Not on file   Social Connections: Not on file   Intimate Partner Violence: Not on file   Housing Stability: Not on file     Current Facility-Administered Medications   Medication Dose Route Frequency Provider Last Rate Last Admin    cefTRIAXone (ROCEPHIN) 2,000 mg in sodium chloride 0.9 % 50 mL IVPB mini-bag  2,000 mg IntraVENous Q24H SCOTT Perez - PORSHA        potassium chloride 20 mEq/50 mL IVPB (Central Line)  20 mEq IntraVENous Once Kait Maurer MD        prochlorperazine (COMPAZINE) injection 10 mg  10 mg IntraVENous Q6H PRN SCOTT Luciano - CNP   10 mg at 10/06/22 0830    [Held by provider] apixaban (ELIQUIS) tablet 5 mg  5 mg Oral BID Thu Lopez, DO   5 mg at 10/05/22 0759    docusate sodium (COLACE) capsule 100 mg  100 mg Oral BID Thu Lopez, DO   100 mg at 10/04/22 1055    vitamin D (ERGOCALCIFEROL) capsule 50,000 Units  50,000 Units Oral Q7 Days Thu Lopez, DO        metoclopramide (REGLAN) tablet 5 mg  5 mg Oral Daily Thu Lopez, DO   5 mg at 10/06/22 0830    oxyCODONE (ROXICODONE) immediate release tablet 5 mg  5 mg Oral Q6H PRN Thu Lopez, DO   5 mg at 10/06/22 1100    pantoprazole (PROTONIX) tablet 40 mg  40 mg Oral BID Thu Lopez, DO   40 mg at 10/06/22 0831    sennosides-docusate sodium (SENOKOT-S) 8.6-50 MG tablet 2 tablet  2 tablet Oral Daily PRN Thu Lopez, DO        sucralfate (CARAFATE) tablet 1 g  1 g Oral 4x Daily AC & HS Thu Lopez, DO   1 g at 10/06/22 1004    valACYclovir (VALTREX) tablet 500 mg  500 mg Oral Daily Thu Lopez, DO   500 mg at 10/04/22 1054    sodium chloride flush 0.9 % injection 5-40 mL  5-40 mL IntraVENous 2 times per day Thu Lopez, DO   10 mL at 10/06/22 1101    sodium chloride flush 0.9 % injection 5-40 mL  5-40 mL IntraVENous PRN Thu Lopez, DO        0.9 % sodium chloride infusion   IntraVENous PRN Thu Lopez, DO        ondansetron (ZOFRAN-ODT) disintegrating tablet 4 mg  4 mg Oral Q8H PRN Thu Lopez, DO        Or    ondansetron (ZOFRAN) injection 4 mg  4 mg IntraVENous Q6H PRN Thu Lopez, DO   4 mg at 10/04/22 0609    polyethylene glycol (GLYCOLAX) packet 17 g  17 g Oral Daily PRN Thu Lopez, DO        acetaminophen (TYLENOL) tablet 650 mg  650 mg Oral Q6H PRN Thu Lopez, DO   650 mg at 10/05/22 0354    Or    acetaminophen (TYLENOL) suppository 650 mg  650 mg Rectal Q6H PRN Thu Lopez, DO        metroNIDAZOLE (FLAGYL) tablet 500 mg  500 mg Oral 3 times per day Thu Lopez, DO   500 mg at 10/06/22 6767    saccharomyces boulardii (FLORASTOR) capsule 250 mg  250 mg Oral BID Thu Lopez, DO   250 mg at 10/04/22 1053    morphine injection 2 mg  2 mg IntraVENous Q4H PRN Thu Lopez, DO   2 mg at 10/06/22 0831       OBJECTIVE:  Patient Vitals for the past 8 hrs:   BP Temp Temp src Pulse Resp SpO2   10/06/22 1105 122/84 97.5 °F (36.4 °C) Oral 72 18 99 %   10/06/22 0720 110/74 97.9 °F (36.6 °C) Oral 88 18 98 %     Temp (24hrs), Av.9 °F (36.6 °C), Min:97.5 °F (36.4 °C), Max:98.1 °F (36.7 °C)    No intake/output data recorded. Physical Exam:  Constitutional: Well developed, well nourished female in no acute distress, sitting comfortably in the hospital bed. HEENT: Normocephalic and atraumatic. Oropharynx is clear, mucous membranes are moist.  Pupils are equal, round, and reactive to light. Extraocular muscles are intact. Sclerae anicteric. Neck supple without JVD. No thyromegaly present. Lymph node   No palpable submandibular, cervical, supraclavicular, axillary or inguinal lymph nodes. Skin Warm and dry. No bruising and no rash noted. No erythema. No pallor. Respiratory Lungs are clear to auscultation bilaterally without wheezes, rales or rhonchi, normal air exchange without accessory muscle use.     CVS Normal rate, regular rhythm and normal S1 and S2. No murmurs, gallops, or rubs. Abdomen Soft, nontender and nondistended, normoactive bowel sounds. No palpable mass. No hepatosplenomegaly. Neuro Grossly nonfocal with no obvious sensory or motor deficits. MSK Normal range of motion in general.  No edema and no tenderness. Psych Appropriate mood and affect. Labs:    Recent Results (from the past 24 hour(s))   Culture, Body Fluid    Collection Time: 10/05/22  2:40 PM    Specimen: Ascitic Fluid;  Body Fluid   Result Value Ref Range    Special Requests NO SPECIAL REQUESTS      Gram stain NO WBC'S SEEN      Gram stain NO DEFINITE ORGANISM SEEN      Culture NO GROWTH 1 DAY     Body fluid cell count    Collection Time: 10/05/22  2:40 PM   Result Value Ref Range    Specimen ASCITIC FLUID      Color, Fluid YELLOW      Appearance, Fluid CLEAR      RBC, Fluid 1,000 /cu mm    WBC, Fluid 419 /cu mm    Segmented Neutrophils, BAL 79 %    Lymphocytes, BAL 17 %    Macrophages, BAL 4 %   Lactate Dehydrogenase, Body Fluid    Collection Time: 10/05/22  2:40 PM   Result Value Ref Range    Fluid Type ASCITIC FLUID      LD, Fluid 291 U/L   Comprehensive Metabolic Panel w/ Reflex to MG    Collection Time: 10/06/22  4:32 AM   Result Value Ref Range    Sodium 139 136 - 145 mmol/L    Potassium 3.1 (L) 3.5 - 5.1 mmol/L    Chloride 101 101 - 110 mmol/L    CO2 31 21 - 32 mmol/L    Anion Gap 7 4 - 13 mmol/L    Glucose 91 65 - 100 mg/dL    BUN 3 (L) 6 - 23 MG/DL    Creatinine 0.40 (L) 0.6 - 1.0 MG/DL    Est, Glom Filt Rate >60 >60 ml/min/1.73m2    Calcium 8.1 (L) 8.3 - 10.4 MG/DL    Total Bilirubin 0.4 0.2 - 1.1 MG/DL    ALT 10 (L) 12 - 65 U/L    AST 6 (L) 15 - 37 U/L    Alk Phosphatase 97 50 - 136 U/L    Total Protein 5.2 (L) 6.3 - 8.2 g/dL    Albumin 2.8 (L) 3.5 - 5.0 g/dL    Globulin 2.4 2.3 - 3.5 g/dL    Albumin/Globulin Ratio 1.2 1.2 - 3.5     CBC with Auto Differential    Collection Time: 10/06/22  4:32 AM   Result Value Ref Range    WBC 8.8 4.3 - 11.1 K/uL    RBC 2.78 (L) 4.05 - 5.2 M/uL    Hemoglobin 7.7 (L) 11.7 - 15.4 g/dL    Hematocrit 24.3 (L) 35.8 - 46.3 %    MCV 87.4 79.6 - 97.8 FL    MCH 27.7 26.1 - 32.9 PG    MCHC 31.7 31.4 - 35.0 g/dL    RDW 15.9 (H) 11.9 - 14.6 %    Platelets 054 482 - 282 K/uL    MPV 10.4 9.4 - 12.3 FL    nRBC 0.00 0.0 - 0.2 K/uL    Differential Type AUTOMATED      Seg Neutrophils 81 (H) 43 - 78 %    Lymphocytes 9 (L) 13 - 44 %    Monocytes 9 4.0 - 12.0 %    Eosinophils % 0 (L) 0.5 - 7.8 %    Basophils 0 0.0 - 2.0 %    Immature Granulocytes 1 0.0 - 5.0 %    Segs Absolute 7.2 1.7 - 8.2 K/UL    Absolute Lymph # 0.8 0.5 - 4.6 K/UL    Absolute Mono # 0.8 0.1 - 1.3 K/UL    Absolute Eos # 0.0 0.0 - 0.8 K/UL    Basophils Absolute 0.0 0.0 - 0.2 K/UL    Absolute Immature Granulocyte 0.1 0.0 - 0.5 K/UL   Magnesium    Collection Time: 10/06/22  4:32 AM   Result Value Ref Range    Magnesium 1.7 (L) 1.8 - 2.4 mg/dL   TYPE AND SCREEN    Collection Time: 10/06/22 10:25 AM   Result Value Ref Range    Crossmatch expiration date 10/09/2022,2359     ABO/Rh A POSITIVE     Antibody Screen NEG        Imaging:    CT Result (most recent):  CT CHEST ABDOMEN PELVIS W CONTRAST 10/03/2022    Narrative  CT CHEST ABDOMEN AND PELVIS WITH CONTRAST  10/3/2022 5:15 PM    HISTORY:  PE; hx of PE; left chest pain; diffuse abdominal pain. Peritoneal  carcinomatosis. TECHNIQUE: The patient received 100 mL Isovue-370 nonionic IV contrast and axial  images were obtained through the chest, abdomen, and pelvis. Multiplanar  reformatted images were generated. All CT scans at this facility used dose  modulation, interactive reconstruction and/or weight based dosing when  appropriate to reduce radiation dose to as low as reasonably achievable. COMPARISON:  June 22, 2022    CHEST: There are no filling defects within the main or proximal segmental  pulmonary artery suggestive of emboli. The thoracic aorta is well-opacified without dissection. There is no thoracic adenopathy.   A right-sided chest port is present with  catheter tip in right atrium. Bilateral pleural effusions are present. There is lower lobe dependent  atelectasis. A few groundglass opacities in the right upper lobe may be areas  of active alveolitis (image 48). ABDOMEN: A percutaneous catheter or drain is present in the right upper  quadrant. A surgical staple line along the outer margin of the stomach is  compatible with a sleeve gastrectomy. Gallbladder: Mucosal enhancement. Pericholecystic fluid. No visible stones. Liver: Mild intrahepatic biliary ductal dilatation. No focal hepatic lesions. Pancreas: Normal.    Spleen: Normal.    Adrenal glands: Normal.    Kidneys: Bilateral ureteral stents are present with proximal loops in the  distended renal pelvis and distal loops in the bladder. Bilateral significant  hydronephrosis has progressed since June 2022. Bowel: The appendix is mildly distended. There is diffuse bowel wall thickening  involving the colon and there are several dilated loops small bowel. This  appearance may be caused by serosal tumor implants or colitis. Small volume  ascites is present and there is diffuse heterogeneous enhancement of the omental  fat (image 28) compatible with metastatic omental implants. Retroperitoneum:No adenopathy. Abdominal aorta is normal in caliber. PELVIS:Ascites. Diffuse bladder wall thickening. Hysterectomy. Bones: There are no aggressive osseous lesions. Impression  1. No pulmonary embolism. 2.  Mild biliary ductal dilatation. 3.  Omental tumor implants, ascites and diffuse bowel wall thickening may be  caused by serosal tumor implants or colitis. 4.  Progression of bilateral hydronephrosis. ASSESSMENT:      ICD-10-CM    1. Intractable vomiting with nausea  R11.2       2. Hypokalemia  E87.6          Ms. Marla Bernal is a 52 y.o. female admitted on 10/3/2022.  The primary encounter diagnosis was Intractable vomiting with nausea. A diagnosis of Hypokalemia was also pertinent to this visit. Ms Reina Bee has a PMH of hydronephrosis s/p ureteral stents. She is a patient of Dr Patricia Linares and being treated for metastatic carcinoma of possible GI primary with known peritoneal carcinomatosis on diagnosis. She recently completed C9 FOLFOX/Avastin 9/8/22. Of note, Dr Cesario Singh attempted debulking surgery in 8/2022 for peritoneal disease but due to adhesions he was unable to debulk. She was recently admitted [facility] 9/12-9/27/22 and had negative EGD but did have abdominal pleurx drain placed 9/26/22 due to recurrent ascites. She saw Dr Patricia Linares in clinic on 9/29/22 and due to lack of symptomatic response and progressive ascites, she was felt to have disease progression and therefore recommended changing to paclitaxel/ramicirumab which was planned to start later this week pending improvement in performance status. Ms Reina Bee presented to ED 10/3/2022 with nausea, vomiting and diarrhea. CT AP showed diffuse wall thickening concerning for colitis vs serosal implants of tumor. She was started on CTX and Flagyl. Of note, scan indicated worsening hydronephrosis with Cr stable at 0.5. UC pending but UA showed leukocytes and WBC but no bacteria. She is feeling well other than nausea. She has not had diarrhea since yesterday. PLAN:    Metastatic carcinoma / peritoneal carcinomatosis / ascites  - Abd Pleurx drain in place - drain per protocol  - Dr Lynne Esparza paclitaxel/ramicirumab as outpatient later this week; currently no plans to give inpatient and with possible colitis    Abdominal pain / nausea /vomiting / fever  - PRN antiemetics  - CT with possible colitis - change CTX/flagyl to LVQ/flagyl  - Check for C diff if diarrhea recurs  10/5 Tmax 101.7. Infectious work-up to date negative including BC/UC - continues on Levaquin/Flagyl. No diarrhea reported. Will check ascitic fluid. 10/6 Afebrile ON. Bcx - NG. Ucx - MWF. Continues on LVQ/Flagyl. Ascites fluid culture - NGTD, however cell count on fluid with PMN > 250, concern for SBP - consult ID and change ABX to Cefotaxime 2 mg q8h - not available on formulary - discussed with pharmD and recommended Rocephin 2 g daily, d/c LVQ, continue Flagyl      Hx of hydronephrosis  - Dr Lupe Donohue planning exchange; would like to complete prior to chemotherapy, if able early next week. 10/5 Discussed with Dr Lupe Donohue. Given fevers, will plan stent exchange tomorrow or over the weekend. 10/6 Stent exchange planned for today. Hx of DVT   - Continue Eliquis    Anemia  - Monitor, transfuse for Hgb <7    Consult PT/OT  Continue home meds  VTE prophylaxis - on Eliquis    Dispo - too soon to determine. Lab studies and imaging studies were personally reviewed. Pertinent old records were reviewed from [address] [place] [address]. Thank you for allowing us to participate in the care of Ms. Sharolyn Paget. SCOTT Santana - NP   [facility] Hematology & Oncology  [address] [place] [address]  Office : [phone]  Fax : [phone]        Attending Addendum:  I have personally performed a face to face diagnostic evaluation on this patient. I have reviewed and agree with the care plan as documented above by  Daljit Guerrero N.P.  My findings are as follows: Patient appears stable, heart rate regular without murmurs, abdomen is non-tender, bowel sounds are positive. 60-year-old patient history of metastatic carcinoma of possible GI origin with known peritoneal carcinomatosis, recently received Avastin-FOLFOX 9/8/2022. Has a Pleurx drain placement for recurrent ascites. On recent evaluation has been recommended to switch therapy to paclitaxel/ramucirumab. She is now admitted through the ED with nausea, vomiting and diarrhea. Contrast showed CT CAP with omental tumor implants, ascites as well as diffuse bowel wall thickening possibly caused by serosal implants versus colitis.   Currently being treated with antibiotics including Levaquin and Flagyl. Febrile: f/u cultures, sent ascites fluid for analysis: possible SBP, change abx, get ID opinion. Urology for stent change. Supportive  care as above. Remains significantly debilitated.            Libra Galvez MD  [facility] Hematology/Oncology  [address] [place] [address]  Office : [phone]  Fax : [phone]

## 2022-10-06 NOTE — BRIEF OP NOTE
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  Item Serial No.  Lot No. LRB No. Used Action   STENT URET 7FR L22CM HYDR+ PGTL Salome Momin  - [id number]  [employer] URET 7FR L22CM HYDR+ PGTL TAPR TIP [facility]- [id number] Right 1 Implanted         Drains:   Open Drain 09/27/22 Right Abdomen;RUQ (Active)   Site Description Clean, dry & intact 10/05/22 1910   Dressing Status Clean, dry & intact 10/05/22 1910   Drainage Appearance Straw 10/05/22 1536   Status Clamped 10/04/22 1932   Output (ml) 400 ml 10/05/22 1536       [REMOVED] Open Drain 09/26/22 RUQ (Removed)   Site Description Clean, dry & intact 09/26/22 2008   Dressing Status Clean, dry & intact 09/26/22 2008       [REMOVED] External Urinary Catheter (Removed)   Site Assessment Clean,dry & intact 09/13/22 2050   Placement Repositioned 09/13/22 2050   Suction 40 mmgHg continuous 09/13/22 2050   Urine Color Yellow 09/13/22 2050   Urine Appearance Clear 09/13/22 2050   Output (mL) 400 mL 09/14/22 0514       [REMOVED] External Urinary Catheter (Removed)   Site Assessment Clean,dry & intact 09/20/22 0806   Placement Replaced 09/18/22 0633   Securement Method Securing device (Describe) 09/14/22 0514   Catheter Care Catheter/Wick replaced 09/18/22 0633   Perineal Care Yes 09/18/22 0633   Suction 40 mmgHg continuous 09/18/22 0633   Urine Color Brown 09/18/22 0633   Urine Appearance Clear 09/18/22 0633       [REMOVED] External Urinary Catheter (Removed)   Site Assessment Clean,dry & intact 09/27/22 0743   Securement Method Securing device (Describe) 09/27/22 0743   Urine Color Natividad 09/27/22 0743   Urine Appearance Hazy 09/27/22 0743   Urine Odor Malodorous 09/27/22 0743   Output (mL) 500 mL 09/27/22 0306       Findings: Both stents were quite encrusted; replaced stent on right side. Not able to replace stent on the left. Significant bullous edema and possible tumor at left UO. Tyler placed.      Electronically signed by Meet Jaramillo MD on 10/6/2022 at 5:11 PM

## 2022-10-06 NOTE — ANESTHESIA PROCEDURE NOTES
Airway  Date/Time: 10/6/2022 4:24 PM  Urgency: elective    Airway not difficult    General Information and Staff    Patient location during procedure: OR  Resident/CRNA: SCOTT Corrales - CRNA  Performed: resident/CRNA     Indications and Patient Condition  Indications for airway management: anesthesia  Spontaneous Ventilation: absent  Sedation level: deep  Preoxygenated: yes  Patient position: sniffing  Mask difficulty assessment: vent by bag mask    Final Airway Details  Final airway type: supraglottic airway      Successful airway: oropharyngeal  Size 3     Number of attempts at approach: 1  Ventilation between attempts: supraglottic airway  Number of other approaches attempted: 0

## 2022-10-06 NOTE — ANESTHESIA PRE PROCEDURE
Department of Anesthesiology  Preprocedure Note       Name:  José Manuel Gasca   Age:  52 y.o.  :  1975                                          MRN:  [id number]         Date:  10/6/2022      Surgeon: Taylor Tesfaye): Jesse Villatoro MD    Procedure: Procedure(s):  CYSTOSCOPY BILATERAL URETERAL STENT EXCHANGE    Medications prior to admission:   Prior to Admission medications    Medication Sig Start Date End Date Taking? Authorizing Provider   oxyCODONE (ROXICODONE) 5 MG immediate release tablet Take 1 tablet by mouth every 6 hours as needed for Pain for up to 30 days. Intended supply: 3 days. Take lowest dose possible to manage pain  Patient not taking: Reported on 10/4/2022 9/29/22 10/29/22  Nick Guaman MD   metoclopramide (REGLAN) 5 MG tablet Take 1 tablet by mouth daily 22   SCOTT Qureshi NP   magnesium oxide (MAG-OX) 400 MG tablet Take 1 tablet by mouth 2 times daily  Patient not taking: Reported on 10/4/2022 9/27/22   SCOTT Qureshi NP   sennosides-docusate sodium (SENOKOT-S) 8.6-50 MG tablet Take 2 tablets by mouth daily as needed for Constipation 22   SCOTT Qureshi NP   polyethylene glycol (GLYCOLAX) 17 g packet Take 17 g by mouth daily as needed for Constipation 9/26/22 10/26/22  SCOTT Qureshi NP   sucralfate (CARAFATE) 1 GM tablet Take 1 tablet by mouth 4 times daily 22   SCOTT Qureshi NP   pantoprazole (PROTONIX) 40 MG tablet Take 1 tablet by mouth in the morning and at bedtime 22   SCOTT Claros CNP   docusate sodium (COLACE, DULCOLAX) 100 MG CAPS Take 100 mg by mouth 2 times daily 22   SCOTT Corrales CNP   apixaban (ELIQUIS) 5 MG TABS tablet Take 1 tablet by mouth in the morning and 1 tablet before bedtime.  22  Nick Guaman MD   potassium chloride (KLOR-CON M) 20 MEQ extended release tablet Take 1 tablet by mouth 2 times daily 22   SCOTT Adams - CNP   ergocalciferol (ERGOCALCIFEROL) 1.25 MG (62652 UT) capsule Take 50,000 Units by mouth every 7 days Takes on Thursday  Patient not taking: No sig reported 10/19/21   Ar Automatic Reconciliation   lidocaine-prilocaine (EMLA) 2.5-2.5 % cream Apply to port about 45 minutes prior to access 3/4/22   Ar Automatic Reconciliation   ondansetron (ZOFRAN) 8 MG tablet Take 8 mg by mouth every 8 hours as needed 3/4/22   Ar Automatic Reconciliation   valACYclovir (VALTREX) 500 MG tablet Take 500 mg by mouth daily  Patient not taking: Reported on 10/4/2022 9/1/21   Ar Automatic Reconciliation       Current medications:    No current facility-administered medications for this visit. No current outpatient medications on file.      Facility-Administered Medications Ordered in Other Visits   Medication Dose Route Frequency Provider Last Rate Last Admin    cefTRIAXone (ROCEPHIN) 2,000 mg in sodium chloride 0.9 % 50 mL IVPB mini-bag  2,000 mg IntraVENous Q24H SCOTT Travis  mL/hr at 10/06/22 1330 2,000 mg at 10/06/22 1330    lidocaine 1 % injection 1 mL  1 mL IntraDERmal Once PRN Ihsan Alexandra MD        lactated ringers infusion   IntraVENous Continuous Ihsan Alexandra MD        sodium chloride flush 0.9 % injection 5-40 mL  5-40 mL IntraVENous 2 times per day Ihsan Alexandra MD        sodium chloride flush 0.9 % injection 5-40 mL  5-40 mL IntraVENous PRN Ihsan Alexandra MD        0.9 % sodium chloride infusion   IntraVENous PRN Ihsan Alexandra MD        midazolam PF (VERSED) injection 2 mg  2 mg IntraVENous Once PRN Ihsan Alexandra MD        potassium chloride 20 mEq/50 mL IVPB (Central Line)  20 mEq IntraVENous Once Jose Barrera MD        prochlorperazine (COMPAZINE) injection 10 mg  10 mg IntraVENous Q6H PRN SCOTT Estevez CNP   10 mg at 10/06/22 0830    [Held by provider] apixaban (ELIQUIS) tablet 5 mg  5 mg Oral BID Thu Lopez, DO   5 mg at 10/05/22 0759    docusate sodium (COLACE) capsule 100 mg  100 mg Oral BID Galen Peñaloza DO 100 mg at 10/04/22 1055    vitamin D (ERGOCALCIFEROL) capsule 50,000 Units  50,000 Units Oral Q7 Days Thu Lopez, DO        metoclopramide (REGLAN) tablet 5 mg  5 mg Oral Daily Thu Lopez, DO   5 mg at 10/06/22 0830    oxyCODONE (ROXICODONE) immediate release tablet 5 mg  5 mg Oral Q6H PRN Thu Lopez, DO   5 mg at 10/06/22 1100    pantoprazole (PROTONIX) tablet 40 mg  40 mg Oral BID Thu Lopez, DO   40 mg at 10/06/22 0831    sennosides-docusate sodium (SENOKOT-S) 8.6-50 MG tablet 2 tablet  2 tablet Oral Daily PRN Thu Lopez, DO        sucralfate (CARAFATE) tablet 1 g  1 g Oral 4x Daily AC & HS Thu Lopez, DO   1 g at 10/06/22 1004    valACYclovir (VALTREX) tablet 500 mg  500 mg Oral Daily Thu Lopez, DO   500 mg at 10/04/22 1054    sodium chloride flush 0.9 % injection 5-40 mL  5-40 mL IntraVENous 2 times per day Thu Lopez, DO   10 mL at 10/06/22 1101    sodium chloride flush 0.9 % injection 5-40 mL  5-40 mL IntraVENous PRN Thu Lopez, DO        0.9 % sodium chloride infusion   IntraVENous PRN Thu Lopez, DO        ondansetron (ZOFRAN-ODT) disintegrating tablet 4 mg  4 mg Oral Q8H PRN Thu Lopez, DO        Or    ondansetron (ZOFRAN) injection 4 mg  4 mg IntraVENous Q6H PRN Thu Lopez, DO   4 mg at 10/04/22 0609    polyethylene glycol (GLYCOLAX) packet 17 g  17 g Oral Daily PRN Thu Lopez, DO        acetaminophen (TYLENOL) tablet 650 mg  650 mg Oral Q6H PRN Thu Lopez, DO   650 mg at 10/05/22 0354    Or    acetaminophen (TYLENOL) suppository 650 mg  650 mg Rectal Q6H PRN Thu Lopez, DO        metroNIDAZOLE (FLAGYL) tablet 500 mg  500 mg Oral 3 times per day Thu Lopez, DO   500 mg at 10/06/22 1423    saccharomyces boulardii (FLORASTOR) capsule 250 mg  250 mg Oral BID Thu Lopez, DO   250 mg at 10/04/22 1053    morphine injection 2 mg  2 mg IntraVENous Q4H PRN Thu Lopez, DO   2 mg at 10/06/22 0831       Allergies:  No Known Allergies    Problem List:    Patient Active Problem List   Diagnosis Code    Fibroids D21.9    Paraesophageal hernia K44.9    Menorrhagia with regular cycle N92.0    Papanicolaou smear of cervix with low grade squamous intraepithelial lesion (LGSIL) R87.612    Iron deficiency anemia due to chronic blood loss D50.0    Anemia D64.9    History of weight loss surgery Z98.84    Carcinoma of unknown primary (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.1    Bilateral hydronephrosis N13.30    Peritoneal carcinoma (HCC) C48.2    Peritoneal carcinomatosis (HCC) C78.6    Hematemesis K92.0    Current use of long term anticoagulation Z79.01    Intractable vomiting R11.10    Generalized abdominal pain R10.84    LFT elevation R79.89    Obstruction of both ureters N13.5    Abdominal pain R10.9    Hypokalemia E87.6    Hypomagnesemia E83.42    Hypoalbuminemia E88.09    GERD (gastroesophageal reflux disease) K21.9    Colitis K52.9    Intractable vomiting with nausea R11.2       Past Medical History:        Diagnosis Date    Anemia     -- not a problem since hyst    Colon cancer ([facility] [address] dx 2022     plans for chemo--- followed by dr Laura RODRIGUEZID-19 2020    no hospitalization    GERD (gastroesophageal reflux disease)     managed with med    History of colonoscopy 2018    louis Tovar (see media note), R     Hx of blood clots 2022    per pt \"small clot on lung identified by CT scan\"  CT Scan impression:- Nonobstructive pulmonary filling defect involving Left Lower Lobe     Hypotension     asymptomatic    Obstruction of fallopian tube     per pt has \"1 good tube\"    Peritoneal carcinomatosis ([facility] [address]     Weight loss     80lbs weight loss after gastric sleeve       Past Surgical History:        Procedure Laterality Date     SECTION  2009    CYSTOSCOPY Bilateral 2022    CYSTOSCOPY BILATERAL RETROGRADE PYELOGRAM performed by Eileen Pro MD [facility]    CYSTOSCOPY Bilateral 2022    [place] EXCHANGE performed by Delia Luong MD at [address]  07/23/2014    gastric sleeve- Choudhari    HYSTERECTOMY (CERVIX STATUS UNKNOWN)      2018    HYSTERECTOMY (CERVIX STATUS UNKNOWN)  07/09/2020    TLH w/ Bilateral salpingectomy and left oophorectomy    IR PERC CATH PLEURAL DRAIN W/IMAG  9/26/2022    IR PERC CATH PLEURAL DRAIN W/IMAG 9/26/2022 [facility]    IR PORT PLACEMENT EQUAL OR GREATER THAN 5 YEARS  2/28/2022    IR PORT PLACEMENT EQUAL OR GREATER THAN 5 YEARS  2/28/2022    IR PORT PLACEMENT EQUAL OR GREATER THAN 5 YEARS 2/28/2022 [facility]    LAPAROTOMY N/A 8/17/2022    EXPLORATORY LAPAROTOMY/ ENTEROENTEROSTOMY performed by Marichuy Osorio MD at [facility] 56  age Adron Fat 21s\"    also \"unblocked her FT\"    TONSILLECTOMY      UPPER GASTROINTESTINAL ENDOSCOPY      with dilation    UPPER GASTROINTESTINAL ENDOSCOPY N/A 9/13/2022    EGD ESOPHAGOGASTRODUODENOSCOPY OFL 17 To IR after recovery for Para performed by Noelle Vizcaino MD at [facility] ENDOSCOPY    UROLOGICAL SURGERY Bilateral 03/15/2022    cysto       Social History:    Social History     Tobacco Use    Smoking status: Never    Smokeless tobacco: Never   Substance Use Topics    Alcohol use: Not Currently                                Counseling given: Not Answered      Vital Signs (Current): There were no vitals filed for this visit.                                            BP Readings from Last 3 Encounters:   10/06/22 (!) 145/86   09/29/22 (!) 147/94   09/27/22 123/82       NPO Status:                                                                                 BMI:   Wt Readings from Last 3 Encounters:   10/05/22 113 lb 6.4 oz (51.4 kg)   09/29/22 122 lb (55.3 kg)   09/26/22 116 lb 1.6 oz (52.7 kg)     There is no height or weight on file to calculate BMI.    CBC:   Lab Results   Component Value Date/Time    WBC 8.8 10/06/2022 04:32 AM    RBC 2.78 10/06/2022 04:32 AM    HGB 7.7 10/06/2022 04:32 AM    HCT 24.3 10/06/2022 04:32 AM    MCV 87.4 10/06/2022 04:32 AM    RDW 15.9 10/06/2022 04:32 AM     10/06/2022 04:32 AM       CMP:   Lab Results   Component Value Date/Time     10/06/2022 04:32 AM    K 3.1 10/06/2022 04:32 AM     10/06/2022 04:32 AM    CO2 31 10/06/2022 04:32 AM    BUN 3 10/06/2022 04:32 AM    CREATININE 0.40 10/06/2022 04:32 AM    GFRAA >60 09/27/2022 02:37 AM    AGRATIO 1.1 05/12/2022 12:43 PM    LABGLOM >60 10/06/2022 04:32 AM    GLUCOSE 91 10/06/2022 04:32 AM    PROT 5.2 10/06/2022 04:32 AM    CALCIUM 8.1 10/06/2022 04:32 AM    BILITOT 0.4 10/06/2022 04:32 AM    ALKPHOS 97 10/06/2022 04:32 AM    ALKPHOS 77 05/12/2022 12:43 PM    AST 6 10/06/2022 04:32 AM    ALT 10 10/06/2022 04:32 AM       POC Tests:   No results for input(s): POCGLU, POCNA, POCK, POCCL, POCBUN, POCHEMO, POCHCT in the last 72 hours.     Coags:   Lab Results   Component Value Date/Time    PROTIME 17.5 09/13/2022 05:56 AM    INR 1.4 09/13/2022 05:56 AM    APTT 23.0 08/10/2022 10:39 AM       HCG (If Applicable): No results found for: PREGTESTUR, PREGSERUM, HCG, HCGQUANT     ABGs: No results found for: PHART, PO2ART, LJH4ILX, ZAA1TFK, BEART, L6AOPBCO     Type & Screen (If Applicable):  No results found for: LABABO, LABRH    Drug/Infectious Status (If Applicable):  No results found for: HIV, HEPCAB    COVID-19 Screening (If Applicable):   Lab Results   Component Value Date/Time    COVID19 Not Detected 02/11/2022 07:14 AM    COVID19 Performed 02/11/2022 07:14 AM           Anesthesia Evaluation  Patient summary reviewed and Nursing notes reviewed no history of anesthetic complications:   Airway: Mallampati: II  TM distance: >3 FB   Neck ROM: full  Mouth opening: > = 3 FB   Dental:      Comment: Fillings    Pulmonary:normal exam                               Cardiovascular:                   ROS comment: Hx of PE on eliquis    PE comment: Tachycardic   Neuro/Psych:   Negative Neuro/Psych ROS GI/Hepatic/Renal:   (+) GERD:,          ROS comment: Bilateral hydronephrosis with ureteral stents    Abdominal pleurex for ascites    Denies recent nausea. Last emesis over 2 days ago. Endo/Other:    (+) blood dyscrasia: anemia:., electrolyte abnormalities (hypokalemia), malignancy/cancer (colon cancer with peritoneal carcinomatosis and ascites ). Abdominal:             Vascular:   + PE. Other Findings:             Anesthesia Plan      general     ASA 3     (LMA    Last dose Eliquis yesterday morning)  Induction: intravenous. MIPS: Postoperative opioids intended and Prophylactic antiemetics administered. Anesthetic plan and risks discussed with patient.                         Sunita Baez MD   10/6/2022

## 2022-10-07 LAB
ANION GAP SERPL CALC-SCNC: 9 MMOL/L (ref 4–13)
BACTERIA SPEC CULT: NORMAL
BASOPHILS # BLD: 0 K/UL (ref 0–0.2)
BASOPHILS NFR BLD: 0 % (ref 0–2)
BUN SERPL-MCNC: 5 MG/DL (ref 6–23)
CALCIUM SERPL-MCNC: 8.3 MG/DL (ref 8.3–10.4)
CHLORIDE SERPL-SCNC: 99 MMOL/L (ref 101–110)
CO2 SERPL-SCNC: 28 MMOL/L (ref 21–32)
CREAT SERPL-MCNC: 0.5 MG/DL (ref 0.6–1)
DIFFERENTIAL METHOD BLD: ABNORMAL
EOSINOPHIL # BLD: 0 K/UL (ref 0–0.8)
EOSINOPHIL NFR BLD: 0 % (ref 0.5–7.8)
ERYTHROCYTE [DISTWIDTH] IN BLOOD BY AUTOMATED COUNT: 16.2 % (ref 11.9–14.6)
GLUCOSE SERPL-MCNC: 98 MG/DL (ref 65–100)
GRAM STN SPEC: NORMAL
GRAM STN SPEC: NORMAL
HCT VFR BLD AUTO: 24 % (ref 35.8–46.3)
HGB BLD-MCNC: 7.5 G/DL (ref 11.7–15.4)
IMM GRANULOCYTES # BLD AUTO: 0.1 K/UL (ref 0–0.5)
IMM GRANULOCYTES NFR BLD AUTO: 1 % (ref 0–5)
LYMPHOCYTES # BLD: 0.7 K/UL (ref 0.5–4.6)
LYMPHOCYTES NFR BLD: 9 % (ref 13–44)
MCH RBC QN AUTO: 27.8 PG (ref 26.1–32.9)
MCHC RBC AUTO-ENTMCNC: 31.3 G/DL (ref 31.4–35)
MCV RBC AUTO: 88.9 FL (ref 79.6–97.8)
MONOCYTES # BLD: 0.5 K/UL (ref 0.1–1.3)
MONOCYTES NFR BLD: 6 % (ref 4–12)
NEUTS SEG # BLD: 6.5 K/UL (ref 1.7–8.2)
NEUTS SEG NFR BLD: 84 % (ref 43–78)
NRBC # BLD: 0 K/UL (ref 0–0.2)
PLATELET # BLD AUTO: 248 K/UL (ref 150–450)
PMV BLD AUTO: 10.4 FL (ref 9.4–12.3)
POTASSIUM SERPL-SCNC: 4 MMOL/L (ref 3.5–5.1)
RBC # BLD AUTO: 2.7 M/UL (ref 4.05–5.2)
SERVICE CMNT-IMP: NORMAL
SODIUM SERPL-SCNC: 136 MMOL/L (ref 136–145)
WBC # BLD AUTO: 7.7 K/UL (ref 4.3–11.1)

## 2022-10-07 PROCEDURE — 6370000000 HC RX 637 (ALT 250 FOR IP): Performed by: FAMILY MEDICINE

## 2022-10-07 PROCEDURE — 99232 SBSQ HOSP IP/OBS MODERATE 35: CPT | Performed by: INTERNAL MEDICINE

## 2022-10-07 PROCEDURE — 97530 THERAPEUTIC ACTIVITIES: CPT

## 2022-10-07 PROCEDURE — 2580000003 HC RX 258: Performed by: NURSE PRACTITIONER

## 2022-10-07 PROCEDURE — 1100000000 HC RM PRIVATE

## 2022-10-07 PROCEDURE — 6360000002 HC RX W HCPCS: Performed by: NURSE PRACTITIONER

## 2022-10-07 PROCEDURE — 97116 GAIT TRAINING THERAPY: CPT

## 2022-10-07 PROCEDURE — 80048 BASIC METABOLIC PNL TOTAL CA: CPT

## 2022-10-07 PROCEDURE — 2580000003 HC RX 258: Performed by: FAMILY MEDICINE

## 2022-10-07 PROCEDURE — 6360000002 HC RX W HCPCS: Performed by: FAMILY MEDICINE

## 2022-10-07 PROCEDURE — 2580000003 HC RX 258: Performed by: ANESTHESIOLOGY

## 2022-10-07 PROCEDURE — 85025 COMPLETE CBC W/AUTO DIFF WBC: CPT

## 2022-10-07 RX ORDER — METRONIDAZOLE 500 MG/1
500 TABLET ORAL 3 TIMES DAILY
Qty: 33 TABLET | Refills: 0 | Status: SHIPPED | OUTPATIENT
Start: 2022-10-07 | End: 2022-10-13

## 2022-10-07 RX ORDER — CIPROFLOXACIN 500 MG/1
500 TABLET, FILM COATED ORAL 2 TIMES DAILY
Qty: 22 TABLET | Refills: 0 | Status: SHIPPED | OUTPATIENT
Start: 2022-10-07 | End: 2022-10-18

## 2022-10-07 RX ADMIN — SODIUM CHLORIDE, PRESERVATIVE FREE 10 ML: 5 INJECTION INTRAVENOUS at 21:35

## 2022-10-07 RX ADMIN — SUCRALFATE 1 G: 1 TABLET ORAL at 16:38

## 2022-10-07 RX ADMIN — SODIUM CHLORIDE, POTASSIUM CHLORIDE, SODIUM LACTATE AND CALCIUM CHLORIDE: 600; 310; 30; 20 INJECTION, SOLUTION INTRAVENOUS at 13:11

## 2022-10-07 RX ADMIN — Medication 250 MG: at 08:04

## 2022-10-07 RX ADMIN — SODIUM CHLORIDE, PRESERVATIVE FREE 10 ML: 5 INJECTION INTRAVENOUS at 09:51

## 2022-10-07 RX ADMIN — APIXABAN 5 MG: 5 TABLET, FILM COATED ORAL at 09:50

## 2022-10-07 RX ADMIN — MORPHINE SULFATE 2 MG: 2 INJECTION, SOLUTION INTRAMUSCULAR; INTRAVENOUS at 19:17

## 2022-10-07 RX ADMIN — PROCHLORPERAZINE EDISYLATE 10 MG: 5 INJECTION INTRAMUSCULAR; INTRAVENOUS at 21:36

## 2022-10-07 RX ADMIN — CEFTRIAXONE 2000 MG: 2 INJECTION, POWDER, FOR SOLUTION INTRAMUSCULAR; INTRAVENOUS at 13:10

## 2022-10-07 RX ADMIN — PANTOPRAZOLE SODIUM 40 MG: 40 TABLET, DELAYED RELEASE ORAL at 08:03

## 2022-10-07 RX ADMIN — MORPHINE SULFATE 2 MG: 2 INJECTION, SOLUTION INTRAMUSCULAR; INTRAVENOUS at 15:10

## 2022-10-07 RX ADMIN — SUCRALFATE 1 G: 1 TABLET ORAL at 08:03

## 2022-10-07 RX ADMIN — Medication 250 MG: at 21:35

## 2022-10-07 RX ADMIN — METRONIDAZOLE 500 MG: 500 TABLET ORAL at 13:10

## 2022-10-07 RX ADMIN — METRONIDAZOLE 500 MG: 500 TABLET ORAL at 21:35

## 2022-10-07 RX ADMIN — APIXABAN 5 MG: 5 TABLET, FILM COATED ORAL at 21:35

## 2022-10-07 RX ADMIN — METOCLOPRAMIDE 5 MG: 10 TABLET ORAL at 08:04

## 2022-10-07 RX ADMIN — SUCRALFATE 1 G: 1 TABLET ORAL at 11:40

## 2022-10-07 RX ADMIN — DOCUSATE SODIUM 100 MG: 100 CAPSULE, LIQUID FILLED ORAL at 21:35

## 2022-10-07 RX ADMIN — PANTOPRAZOLE SODIUM 40 MG: 40 TABLET, DELAYED RELEASE ORAL at 21:35

## 2022-10-07 RX ADMIN — METRONIDAZOLE 500 MG: 500 TABLET ORAL at 06:30

## 2022-10-07 ASSESSMENT — PAIN SCALES - GENERAL
PAINLEVEL_OUTOF10: 0
PAINLEVEL_OUTOF10: 7
PAINLEVEL_OUTOF10: 0
PAINLEVEL_OUTOF10: 7

## 2022-10-07 NOTE — OP NOTE
[facility]  OPERATIVE REPORT    Name:  Barbara Bonner  MR#:  [id number]  :  1975  ACCOUNT #:  [de-identified]  DATE OF SERVICE:  10/06/2022    PREOPERATIVE DIAGNOSES:  Metastatic carcinoma of unknown origin and bilateral hydronephrosis. POSTOPERATIVE DIAGNOSES:  Metastatic carcinoma of unknown origin and bilateral hydronephrosis. PROCEDURE PERFORMED:  Cystoscopy with exchange of right ureteral stent and removal of left ureteral stent and left retrograde. SURGEON:  Giovanna Rasmussen MD    ASSISTANT:  Ilana Chao. ANESTHESIA:  General with LMA. COMPLICATIONS:  none. SPECIMENS REMOVED:  None. IMPLANTS:  Right double-J ureteral stent 7-Belarusian x 22 cm. ESTIMATED BLOOD LOSS:  Minimal.    INDICATIONS FOR THE PROCEDURE:  The patient is a pleasant 71-year-old female with metastatic carcinoma of unknown primary. She has undergone extensive treatment. She has bilateral hydronephrosis and has had ureteral stents in place. She presents to me for exchange of those. FINDINGS:  Both stents were quite encrusted. The wire was not able to pass through the lumen of either stent. On the right side, I was able to pass the wire alongside the stent and remove it. On the left side, that was not successful as there was severe bullous edema around the UO and what looks to be possibly tumor involvement of the UO. The stent was removed to hopefully allow room for the wire to pass and unfortunately we were not able to pass it off the left ureter. A retrograde pyelogram of that side was attempted and I could only visualize the very distal portion of the ureter. There appeared to be abrupt high-grade blockage. PROCEDURE:  After informed consent was obtained, the patient was taken to the operating room #9 in [address] [place] [address]. After induction of general anesthesia and placement of LMA, she was carefully placed in low lithotomy position.   Her genitalia were prepped and draped in the usual sterile fashion. Next, I inserted a 22-German rigid cystoscope. Her bladder had significant areas of petechiae and severe bullous edema around the left UO. There also appeared to possibly be metastatic tumor around that ureteral orifice as there was a firm vascular whitish colored lesion. This did not look like typical bladder cancer. Both stents were in good position. They were both heavily encrusted. I next grasped the right ureteral stent with a stent grasper and pulled it back to the meatus. It was too encrusted to allow passage of a wire to the lumen. I then reinserted the scope and passed the sensor guidewire alongside the stent. This was done under fluoroscopic guidance. The stent was then removed. Next, over that wire using fluoroscopic guidance and direct view with cystoscopy, I placed a 7-German x 22 cm double-J stent. There was a good curl in the upper portion of the collecting system as well as the bladder. We then emptied the bladder and turned our attention to the left side. Again the stent on the left side was grasped and brought to the meatus. I was not able to pass a wire across it. I next attempted to place a sensor wire alongside of the stent. This was unsuccessful. I then elected to remove the stent and hopefully then be able to pass the wire. That was not successful either. I then obtained retrograde pyelogram through an open-ended 5-German ureteral catheter and there was contrast in the very distal ureter, but then there appeared to be high-grade obstruction. We tried again with the wire in various positions as well as open-ended catheter and were not successful in obtaining access to her ureter and collecting system on that side. At this time, I elected to abort the procedure and see how she does from a clinical standpoint and how her renal function performs. She may need percutaneous nephrostomy tube. Cystoscope was then removed.   I then placed an 18-Armenian Tyler catheter to drain her bladder overnight. She was awakened, LMA was removed, she was taken to recovery room in stable condition. There were no known complications. She will be transferred back to oncology service. If her renal function worsens or she has flank pain, I will recommend a left nephrostomy tube with internalization in the future.       MD FARIHA John/[email or web address]  D:  10/06/2022 17:21  T:  10/06/2022 23:42  JOB #:  [id number]

## 2022-10-07 NOTE — PROGRESS NOTES
Urology Progress Note    Admit Date: 10/3/2022    Subjective:     Patient has no new complaints. No flank pain; no hematuria; creat stable at 0.5. Objective:     Patient Vitals for the past 8 hrs:   BP Temp Temp src Pulse Resp SpO2   10/07/22 0428 108/81 97.7 °F (36.5 °C) Oral 84 18 98 %     No intake/output data recorded. 10/05 1901 - 10/07 0700  In: 12 [P.O.:360; I.V.:600]  Out: 200 [Urine:1900]    Physical Exam:     Awake, alert, and oriented  Lungs no JVD. Breathing is  non-labored; no audible wheezing.     Heart regular, normal perfusion  Abd soft, nt, nd  UOP clear      Data Review   Recent Results (from the past 24 hour(s))   TYPE AND SCREEN    Collection Time: 10/06/22 10:25 AM   Result Value Ref Range    Crossmatch expiration date 10/09/2022,2359     ABO/Rh A POSITIVE     Antibody Screen NEG    CBC with Auto Differential    Collection Time: 10/07/22  4:33 AM   Result Value Ref Range    WBC 7.7 4.3 - 11.1 K/uL    RBC 2.70 (L) 4.05 - 5.2 M/uL    Hemoglobin 7.5 (L) 11.7 - 15.4 g/dL    Hematocrit 24.0 (L) 35.8 - 46.3 %    MCV 88.9 79.6 - 97.8 FL    MCH 27.8 26.1 - 32.9 PG    MCHC 31.3 (L) 31.4 - 35.0 g/dL    RDW 16.2 (H) 11.9 - 14.6 %    Platelets 914 494 - 008 K/uL    MPV 10.4 9.4 - 12.3 FL    nRBC 0.00 0.0 - 0.2 K/uL    Differential Type AUTOMATED      Seg Neutrophils 84 (H) 43 - 78 %    Lymphocytes 9 (L) 13 - 44 %    Monocytes 6 4.0 - 12.0 %    Eosinophils % 0 (L) 0.5 - 7.8 %    Basophils 0 0.0 - 2.0 %    Immature Granulocytes 1 0.0 - 5.0 %    Segs Absolute 6.5 1.7 - 8.2 K/UL    Absolute Lymph # 0.7 0.5 - 4.6 K/UL    Absolute Mono # 0.5 0.1 - 1.3 K/UL    Absolute Eos # 0.0 0.0 - 0.8 K/UL    Basophils Absolute 0.0 0.0 - 0.2 K/UL    Absolute Immature Granulocyte 0.1 0.0 - 0.5 K/UL   Basic Metabolic Panel    Collection Time: 10/07/22  4:33 AM   Result Value Ref Range    Sodium 136 136 - 145 mmol/L    Potassium 4.0 3.5 - 5.1 mmol/L    Chloride 99 (L) 101 - 110 mmol/L    CO2 28 21 - 32 mmol/L    Anion Gap 9 4 - 13 mmol/L    Glucose 98 65 - 100 mg/dL    BUN 5 (L) 6 - 23 MG/DL    Creatinine 0.50 (L) 0.6 - 1.0 MG/DL    Est, Glom Filt Rate >60 >60 ml/min/1.73m2    Calcium 8.3 8.3 - 10.4 MG/DL           Assessment:     Principal Problem:    Abdominal pain  Active Problems:    Bilateral hydronephrosis    Peritoneal carcinomatosis (HCC)    Current use of long term anticoagulation    Generalized abdominal pain    Hypokalemia    Hypomagnesemia    Hypoalbuminemia    GERD (gastroesophageal reflux disease)    Colitis    Intractable vomiting with nausea    Fever    Anemia    Carcinoma of unknown primary (HCC)    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 *      POD 1 from cysto with right stent exchange and removal of left stent. I was not able to replace left stent b/c of bullous edema and possible tumor involvement of left UO. She does not appear obstructed on the left, as she has no flank pain and creat is unchanged. I rec you move forward with systemic therapy. Should she develop flank pain, worsening renal function, then can consider left perc tube.     Plan:     -remove hyman  -will arrange fu with me in about 6-8 weeks  -call with questions       Za Luciano MD    [facility] Urology  [address] [facility]

## 2022-10-07 NOTE — PROGRESS NOTES
Case Management Team following for discharge planning. Patient is s/p PROCEDURE PERFORMED:  Cystoscopy with exchange of right ureteral stent and removal of left ureteral stent and left retrograde. No anticipated needs at this time. Case management will continue to follow. Please consult case management team if any needs for discharge arise.

## 2022-10-07 NOTE — PROGRESS NOTES
ACUTE PHYSICAL THERAPY GOALS:   (Developed with and agreed upon by patient and/or caregiver.)    (1.)Ms. Jessenia Villavicencio will tolerate 25+ minutes of therapeutic exercise in order to demonstrate improved activity tolerance within 7 treatment days. (2.)Ms. Jessenia Villavicencio will transfer from bed to chair and chair to bed with INDEPENDENCE using the least restrictive device within 5 treatment day(s). (3.)Ms. Jessenia Villavicencio will ambulate with INDEPENDENCE for a minimum of 500 feet with the least restrictive device within 7 treatment day(s). PHYSICAL THERAPY: Daily Note AM   (Link to Caseload Tracking: PT Visit Days : 2  Time In/Out PT Charge Capture  Rehab Caseload Tracker  Orders    Elex Holstein is a 52 y.o. female   PRIMARY DIAGNOSIS: Abdominal pain  Hypokalemia [E87.6]  Abdominal pain [R10.9]  Intractable vomiting with nausea [R11.2]  Procedure(s) (LRB):  CYSTOSCOPY BILATERAL URETERAL STENT REMOVAL, RIGHT URETERAL STENT PLACEMENT (Bilateral)  1 Day Post-Op  Inpatient: Payor: Elian Hannah / Plan: Etienne Reynoso / Product Type: *No Product type* /     ASSESSMENT:     REHAB RECOMMENDATIONS:   Recommendation to date pending progress:  Setting:  No further skilled therapy after discharge from hospital    Equipment:    To Be Determined     ASSESSMENT:  Ms. Jessenia Villavicencio was sitting in chair on contact. She was agreeable to work with therapy and stood and amb 280' holding IV pole. She returned to chair in room. Walks at a moderate pace. No LOB's.      SUBJECTIVE:   Ms. Jessenia Villavicencio only nodded to answer questions.      Social/Functional Type of Home: House  Receives Help From: Family  ADL Assistance: Independent  Ambulation Assistance: Independent  Transfer Assistance: Independent  Active : Yes  OBJECTIVE:     PAIN: Janna Love / O2: Delaquiles Gaby / Aleksandra Garre / DRAINS:   Pre Treatment:          Post Treatment:  Vitals        Oxygen    IV    RESTRICTIONS/PRECAUTIONS:        MOBILITY: I Mod I S SBA CGA Min Mod Max Total  NT x2 Comments:   Bed Mobility Rolling [] [] [] [] [] [] [] [] [] [x] []    Supine to Sit [] [] [] [] [] [] [] [] [] [x] []    Scooting [] [] [] [] [] [] [] [] [] [x] []    Sit to Supine [] [] [] [] [] [] [] [] [] [x] []    Transfers    Sit to Stand [] [] [] [x] [] [] [] [] [] [] []    Bed to Chair [] [] [] [] [] [] [] [] [] [x] []    Stand to Sit [] [] [] [x] [] [] [] [] [] [] []     [] [] [] [] [] [] [] [] [] [] []    I=Independent, Mod I=Modified Independent, S=Supervision, SBA=Standby Assistance, CGA=Contact Guard Assistance,   Min=Minimal Assistance, Mod=Moderate Assistance, Max=Maximal Assistance, Total=Total Assistance, NT=Not Tested    BALANCE: Good Fair+ Fair Fair- Poor NT Comments   Sitting Static [x] [] [] [] [] []    Sitting Dynamic [x] [] [] [] [] []              Standing Static [] [x] [] [] [] []    Standing Dynamic [] [x] [] [] [] []      GAIT: I Mod I S SBA CGA Min Mod Max Total  NT x2 Comments:   Level of Assistance [] [] [] [] [] [] [] [] [] [] []    Distance 280  feet    DME Holding IV pole    Gait Quality Narrow base of support and Path deviations     Weightbearing Status      Stairs      I=Independent, Mod I=Modified Independent, S=Supervision, SBA=Standby Assistance, CGA=Contact Guard Assistance,   Min=Minimal Assistance, Mod=Moderate Assistance, Max=Maximal Assistance, Total=Total Assistance, NT=Not Tested    PLAN:   FREQUENCY AND DURATION: 3 times/week for duration of hospital stay or until stated goals are met, whichever comes first.    TREATMENT:   TREATMENT:   Gait Training (10 Minutes): Gait training for 280 feet utilizing IV pole. Patient required Verbal cueing to improve Gait Mechanics.      TREATMENT GRID:  N/A    AFTER TREATMENT PRECAUTIONS: Chair and Needs within reach    INTERDISCIPLINARY COLLABORATION:  RN/ PCT and PT/ PTA    EDUCATION:      TIME IN/OUT:  Time In: 0639  Time Out: 9734  Minutes: 10    LIZANDRO TURCIOS PTA

## 2022-10-07 NOTE — PROGRESS NOTES
[facility] Hematology & Oncology        Inpatient Hematology / Oncology Progress Note    Reason for Admission:  Hypokalemia [E87.6]  Abdominal pain [R10.9]  Intractable vomiting with nausea [R11.2]    24 Hour Events:  VSS, Afeb  S/p R ureteral stent exchange yesterday  On CTX/flagyl for SBP  BC NGTD  PO intake improving  Overall, feeling better      ROS:  Constitutional: Negative for fever, chills, weakness, malaise, fatigue. CV: Negative for chest pain, palpitations, edema. Respiratory: Negative for dyspnea, cough, wheezing. GI: Negative for nausea, abdominal pain, diarrhea. 10 point review of systems is otherwise negative with the exception of the elements mentioned above in the HPI.       No Known Allergies  Past Medical History:   Diagnosis Date    Anemia     -- not a problem since hyst    Colon cancer ([facility] [address] dx 2022     plans for chemo--- followed by dr Jeff EL-19 2020    no hospitalization    GERD (gastroesophageal reflux disease)     managed with med    History of colonoscopy 2018    Dr. Asad Clarke, nl (see media note), R     Hx of blood clots 2022    per pt \"small clot on lung identified by CT scan\"  CT Scan impression:- Nonobstructive pulmonary filling defect involving Left Lower Lobe     Hypotension     asymptomatic    Obstruction of fallopian tube     per pt has \"1 good tube\"    Peritoneal carcinomatosis ([facility] [address]     Weight loss     80lbs weight loss after gastric sleeve     Past Surgical History:   Procedure Laterality Date     SECTION      CYSTOSCOPY Bilateral 2022    CYSTOSCOPY BILATERAL RETROGRADE PYELOGRAM performed by Earnest Gupta MD at [address] Bilateral 2022    BILATERAL CYSTOSCOPY URETERAL STENT EXCHANGE performed by Earnest Gupta MD at [address] Bilateral 10/6/2022    CYSTOSCOPY BILATERAL URETERAL STENT REMOVAL, RIGHT URETERAL [address] [facility] [address] [address] [address] performed by Earnest Gupta MD at [facility] MAIN OR    GASTRIC BYPASS SURGERY  07/23/2014    gastric sleeve- Choudhari    HYSTERECTOMY (CERVIX STATUS UNKNOWN)      2018    HYSTERECTOMY (CERVIX STATUS UNKNOWN)  07/09/2020    [facility] w/ Bilateral salpingectomy and left oophorectomy    IR PERC CATH PLEURAL DRAIN W/IMAG  9/26/2022    IR PERC CATH PLEURAL DRAIN W/IMAG 9/26/2022 [facility]    IR PORT PLACEMENT EQUAL OR GREATER THAN 5 YEARS  2/28/2022    IR PORT PLACEMENT EQUAL OR GREATER THAN 5 YEARS  2/28/2022    IR PORT PLACEMENT EQUAL OR GREATER THAN 5 YEARS 2/28/2022 [facility]    LAPAROTOMY N/A 8/17/2022    EXPLORATORY LAPAROTOMY/ ENTEROENTEROSTOMY performed by Jameson Masters MD at [facility] [address]  age Garcia Elkin 21s\"    also \"unblocked her FT\"    TONSILLECTOMY      UPPER GASTROINTESTINAL ENDOSCOPY      with dilation    UPPER GASTROINTESTINAL ENDOSCOPY N/A 9/13/2022    EGD ESOPHAGOGASTRODUODENOSCOPY OFL 17 To IR after recovery for Para performed by Evangelist Fajardo MD at [facility] ENDOSCOPY    UROLOGICAL SURGERY Bilateral 03/15/2022    cysto     Family History   Problem Relation Age of Onset    Heart Disease Maternal Grandmother     Hypertension Maternal Grandmother     Heart Disease Maternal Grandfather     Hypertension Maternal Grandfather     Pulmonary Embolism Neg Hx     Colon Cancer Neg Hx     Deep Vein Thrombosis Neg Hx     Ovarian Cancer Neg Hx     Prostate Cancer Neg Hx     Breast Cancer Neg Hx      Social History     Socioeconomic History    Marital status: Single     Spouse name: Not on file    Number of children: Not on file    Years of education: Not on file    Highest education level: Not on file   Occupational History    Not on file   Tobacco Use    Smoking status: Never    Smokeless tobacco: Never   Vaping Use    Vaping Use: Never used   Substance and Sexual Activity    Alcohol use: Not Currently    Drug use: No    Sexual activity: Not on file   Other Topics Concern    Not on file   Social History Narrative    1. Use large speculum.    2.  sister, Khalif Astorga #[id number] and mom is Zach Jean Baptiste #[id number] (both Kofoed's pts)  3.   PCP  Dr. Jimmy Hodgson ([facility]), GI Dr. Shin Bello-2018 normal EGD     Social Determinants of Health     Financial Resource Strain: Not on file   Food Insecurity: Not on file   Transportation Needs: Not on file   Physical Activity: Not on file   Stress: Not on file   Social Connections: Not on file   Intimate Partner Violence: Not on file   Housing Stability: Not on file     Current Facility-Administered Medications   Medication Dose Route Frequency Provider Last Rate Last Admin    cefTRIAXone (ROCEPHIN) 2,000 mg in sodium chloride 0.9 % 50 mL IVPB mini-bag  2,000 mg IntraVENous Q24H SCOTT Lobo -  mL/hr at 10/07/22 1310 2,000 mg at 10/07/22 1310    lactated ringers infusion   IntraVENous Continuous Brice Perez MD 75 mL/hr at 10/07/22 1311 New Bag at 10/07/22 1311    potassium chloride 20 mEq/50 mL IVPB (Central Line)  20 mEq IntraVENous Once Ileana Davis MD        prochlorperazine (COMPAZINE) injection 10 mg  10 mg IntraVENous Q6H PRN SCOTT Mathias - CNP   10 mg at 10/06/22 0830    apixaban (ELIQUIS) tablet 5 mg  5 mg Oral BID Thu Lopez, DO   5 mg at 10/07/22 0950    docusate sodium (COLACE) capsule 100 mg  100 mg Oral BID Thu Lopez, DO   100 mg at 10/04/22 1055    vitamin D (ERGOCALCIFEROL) capsule 50,000 Units  50,000 Units Oral Q7 Days Thu Lopez, DO        metoclopramide (REGLAN) tablet 5 mg  5 mg Oral Daily Thu Lopez, DO   5 mg at 10/07/22 0804    oxyCODONE (ROXICODONE) immediate release tablet 5 mg  5 mg Oral Q6H PRN Thu Lopez, DO   5 mg at 10/06/22 1100    pantoprazole (PROTONIX) tablet 40 mg  40 mg Oral BID Thu Lopez, DO   40 mg at 10/07/22 0803    sennosides-docusate sodium (SENOKOT-S) 8.6-50 MG tablet 2 tablet  2 tablet Oral Daily PRN Thu Lopez, DO        sucralfate (CARAFATE) tablet 1 g  1 g Oral 4x Daily AC & HS Thu Lopez, DO   1 g at 10/07/22 1140    valACYclovir (VALTREX) tablet 500 mg  500 mg Oral Daily Thu Lopez, DO   500 mg at 10/04/22 1054    sodium chloride flush 0.9 % injection 5-40 mL  5-40 mL IntraVENous 2 times per day Thu Lopez, DO   10 mL at 10/07/22 0951    sodium chloride flush 0.9 % injection 5-40 mL  5-40 mL IntraVENous PRN Thu Lopez, DO        0.9 % sodium chloride infusion   IntraVENous PRN Thu Lopez, DO        ondansetron (ZOFRAN-ODT) disintegrating tablet 4 mg  4 mg Oral Q8H PRN Thu Lopez, DO        Or    ondansetron (ZOFRAN) injection 4 mg  4 mg IntraVENous Q6H PRN Thu Lopez, DO   4 mg at 10/04/22 0609    polyethylene glycol (GLYCOLAX) packet 17 g  17 g Oral Daily PRN Thu Lopez, DO        acetaminophen (TYLENOL) tablet 650 mg  650 mg Oral Q6H PRN Thu Lopez, DO   650 mg at 10/05/22 0354    Or    acetaminophen (TYLENOL) suppository 650 mg  650 mg Rectal Q6H PRN Thu Lopez, DO        metroNIDAZOLE (FLAGYL) tablet 500 mg  500 mg Oral 3 times per day Thu Lopez, DO   500 mg at 10/07/22 1310    saccharomyces boulardii (FLORASTOR) capsule 250 mg  250 mg Oral BID Thu Lopez, DO   250 mg at 10/07/22 0804    morphine injection 2 mg  2 mg IntraVENous Q4H PRN Thu Lopez, DO   2 mg at 10/06/22 1907       OBJECTIVE:  Patient Vitals for the past 8 hrs:   BP Temp Temp src Pulse Resp SpO2   10/07/22 1115 114/87 98.1 °F (36.7 °C) Oral 88 18 98 %   10/07/22 0720 123/82 97.5 °F (36.4 °C) Oral 72 18 99 %     Temp (24hrs), Av.7 °F (36.5 °C), Min:97.4 °F (36.3 °C), Max:98.7 °F (37.1 °C)    10/07 07 - 10/07 190  In: 600 [P.O.:600]  Out: 500 [Urine:500]    Physical Exam:  Constitutional: Well developed, well nourished female in no acute distress, sitting comfortably in the hospital bed. HEENT: Normocephalic and atraumatic. Oropharynx is clear, mucous membranes are moist.  Pupils are equal, round, and reactive to light. Extraocular muscles are intact. Sclerae anicteric. Neck supple without JVD. No thyromegaly present.     Lymph node   No palpable submandibular, cervical, supraclavicular, axillary or inguinal lymph nodes.   Skin Warm and dry. No bruising and no rash noted. No erythema. No pallor. Respiratory Lungs are clear to auscultation bilaterally without wheezes, rales or rhonchi, normal air exchange without accessory muscle use. CVS Normal rate, regular rhythm and normal S1 and S2. No murmurs, gallops, or rubs. Abdomen Soft, nontender and nondistended, normoactive bowel sounds. No palpable mass. No hepatosplenomegaly. Neuro Grossly nonfocal with no obvious sensory or motor deficits. MSK Normal range of motion in general.  No edema and no tenderness. Psych Appropriate mood and affect.         Labs:    Recent Results (from the past 24 hour(s))   CBC with Auto Differential    Collection Time: 10/07/22  4:33 AM   Result Value Ref Range    WBC 7.7 4.3 - 11.1 K/uL    RBC 2.70 (L) 4.05 - 5.2 M/uL    Hemoglobin 7.5 (L) 11.7 - 15.4 g/dL    Hematocrit 24.0 (L) 35.8 - 46.3 %    MCV 88.9 79.6 - 97.8 FL    MCH 27.8 26.1 - 32.9 PG    MCHC 31.3 (L) 31.4 - 35.0 g/dL    RDW 16.2 (H) 11.9 - 14.6 %    Platelets 247 735 - 816 K/uL    MPV 10.4 9.4 - 12.3 FL    nRBC 0.00 0.0 - 0.2 K/uL    Differential Type AUTOMATED      Seg Neutrophils 84 (H) 43 - 78 %    Lymphocytes 9 (L) 13 - 44 %    Monocytes 6 4.0 - 12.0 %    Eosinophils % 0 (L) 0.5 - 7.8 %    Basophils 0 0.0 - 2.0 %    Immature Granulocytes 1 0.0 - 5.0 %    Segs Absolute 6.5 1.7 - 8.2 K/UL    Absolute Lymph # 0.7 0.5 - 4.6 K/UL    Absolute Mono # 0.5 0.1 - 1.3 K/UL    Absolute Eos # 0.0 0.0 - 0.8 K/UL    Basophils Absolute 0.0 0.0 - 0.2 K/UL    Absolute Immature Granulocyte 0.1 0.0 - 0.5 K/UL   Basic Metabolic Panel    Collection Time: 10/07/22  4:33 AM   Result Value Ref Range    Sodium 136 136 - 145 mmol/L    Potassium 4.0 3.5 - 5.1 mmol/L    Chloride 99 (L) 101 - 110 mmol/L    CO2 28 21 - 32 mmol/L    Anion Gap 9 4 - 13 mmol/L    Glucose 98 65 - 100 mg/dL    BUN 5 (L) 6 - 23 MG/DL    Creatinine 0.50 (L) 0.6 - 1.0 MG/DL    Est, Glom Filt Rate >60 >60 ml/min/1.73m2 small bowel. This  appearance may be caused by serosal tumor implants or colitis. Small volume  ascites is present and there is diffuse heterogeneous enhancement of the omental  fat (image 28) compatible with metastatic omental implants. Retroperitoneum:No adenopathy. Abdominal aorta is normal in caliber. PELVIS:Ascites. Diffuse bladder wall thickening. Hysterectomy. Bones: There are no aggressive osseous lesions. Impression  1. No pulmonary embolism. 2.  Mild biliary ductal dilatation. 3.  Omental tumor implants, ascites and diffuse bowel wall thickening may be  caused by serosal tumor implants or colitis. 4.  Progression of bilateral hydronephrosis. ASSESSMENT:      ICD-10-CM    1. Intractable vomiting with nausea  R11.2       2. Hypokalemia  E87.6          Ms. Skye Calvillo is a 52 y.o. female admitted on 10/3/2022. The primary encounter diagnosis was Intractable vomiting with nausea. A diagnosis of Hypokalemia was also pertinent to this visit. Ms Skye Calvillo has a PMH of hydronephrosis s/p ureteral stents. She is a patient of Dr Vince Arauz and being treated for metastatic carcinoma of possible GI primary with known peritoneal carcinomatosis on diagnosis. She recently completed C9 FOLFOX/Avastin 9/8/22. Of note, Dr Marilou Anderson attempted debulking surgery in 8/2022 for peritoneal disease but due to adhesions he was unable to debulk. She was recently admitted [address] [place] [address] 9/12-9/27/22 and had negative EGD but did have abdominal pleurx drain placed 9/26/22 due to recurrent ascites. She saw Dr Vince Arauz in clinic on 9/29/22 and due to lack of symptomatic response and progressive ascites, she was felt to have disease progression and therefore recommended changing to paclitaxel/ramicirumab which was planned to start later this week pending improvement in performance status. Ms Skye Calvillo presented to ED 10/3/2022 with nausea, vomiting and diarrhea.  CT AP showed diffuse wall thickening concerning for colitis vs serosal implants of tumor. She was started on CTX and Flagyl. Of note, scan indicated worsening hydronephrosis with Cr stable at 0.5. UC pending but UA showed leukocytes and WBC but no bacteria. She is feeling well other than nausea. She has not had diarrhea since yesterday. PLAN:    Metastatic carcinoma / peritoneal carcinomatosis / ascites  - Abd Pleurx drain in place - drain per protocol  - Dr Oh Sites paclitaxel/ramicirumab as outpatient later this week; currently no plans to give inpatient and with possible colitis    Abdominal pain / nausea /vomiting / fever  - PRN antiemetics  - CT with possible colitis - change CTX/flagyl to LVQ/flagyl  - Check for C diff if diarrhea recurs  10/5 Tmax 101.7. Infectious work-up to date negative including BC/UC - continues on Levaquin/Flagyl. No diarrhea reported. Will check ascitic fluid. 10/6 Afebrile ON. Bcx - NG. Ucx - MWF. Continues on LVQ/Flagyl. Ascites fluid culture - NGTD, however cell count on fluid with PMN > 250, concern for SBP - consult ID and change ABX to Cefotaxime 2 mg q8h - not available on formulary - discussed with pharmD and recommended Rocephin 2 g daily, d/c LVQ, continue Flagyl    10/7 BC and ascitic culture negative. Continues CTX/Flagyl - ID agrees. Will change to cipro/flagyl on DC. Nausea improved s/p dexamethasone the other day. Hx of hydronephrosis  - Dr Johny De Jesus planning exchange; would like to complete prior to chemotherapy, if able early next week. 10/5 Discussed with Dr Johny De Jesus. Given fevers, will plan stent exchange tomorrow or over the weekend. 10/6 Stent exchange planned for today. 10/7 R stent exchanged yesterday. L stent unable to exchange due to edema and possible tumor involvement at L UO but Cr remains stable.      Hx of DVT   - Continue Eliquis    Anemia  - Monitor, transfuse for Hgb <7    Consult PT/OT  Continue home meds  VTE prophylaxis - on Eliquis    Dispo - If afebrile and otherwise improved, DC home tomorrow with oral antibiotics. Has appt with Dr Serina Barney next Thursday. Lab studies and imaging studies were personally reviewed. Pertinent old records were reviewed from [address] [place] [address] Rd. Thank you for allowing us to participate in the care of Ms. Rubia Barajas. Ramonita Shah, SCOTT [facility] [address] Hematology & Oncology  [id number] [place] [address]  Office : [phone]  Fax : [phone]      Attending Addendum:  I have personally performed a face to face diagnostic evaluation on this patient. I have reviewed and agree with the care plan as documented above by  Avani Morris N.P.  My findings are as follows: Patient appears stable, heart rate regular without murmurs, abdomen is non-tender, bowel sounds are positive. 51-year-old patient history of metastatic carcinoma of possible GI origin with known peritoneal carcinomatosis, recently received Avastin-FOLFOX 9/8/2022. Has a Pleurx drain placement for recurrent ascites. On recent evaluation has been recommended to switch therapy to paclitaxel/ramucirumab. She is now admitted through the ED with nausea, vomiting and diarrhea. Contrast showed CT CAP with omental tumor implants, ascites as well as diffuse bowel wall thickening possibly caused by serosal implants versus colitis. Currently being treated with antibiotics including ceftriaxone and Flagyl. Febrile: f/u cultures, sent ascites fluid for analysis: possible SBP, change abx, get ID opinion. S/p RT ureterel stent change. Supportive  care as above. Remains significantly debilitated.            Hill Ricks MD  [facility] Hematology/Oncology  [address] [address]  Office : [phone]  Fax : [phone]

## 2022-10-07 NOTE — DISCHARGE SUMMARY
[facility] Hematology & Oncology: Inpatient Hematology / Oncology Discharge Summary Note    Patient ID:  Delilah Juarez  [id number]  36 y.o.  1975    Admit Date: 10/3/2022    Discharge Date: 10/7/2022    Admission Diagnoses: Hypokalemia [E87.6]  Abdominal pain [R10.9]  Intractable vomiting with nausea [R11.2]    Discharge Diagnoses:  Principal Diagnosis: Abdominal pain  Principal Problem:    Abdominal pain  Active Problems:    Bilateral hydronephrosis    Peritoneal carcinomatosis ([address]    Current use of long term anticoagulation    Generalized abdominal pain    Hypokalemia    Hypomagnesemia    Hypoalbuminemia    GERD (gastroesophageal reflux disease)    Colitis    Intractable vomiting with nausea    Fever    Anemia    Carcinoma of unknown primary ([address]    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 [facility] Course:    Ms. Mariano Dudley is a 52 y.o. female admitted on 10/3/2022. The primary encounter diagnosis was Intractable vomiting with nausea. A diagnosis of Hypokalemia was also pertinent to this visit. Ms Mariano Dudley has a PMH of hydronephrosis s/p ureteral stents. She is a patient of Dr Fatmata Forrest and being treated for metastatic carcinoma of possible GI primary with known peritoneal carcinomatosis on diagnosis. She recently completed C9 FOLFOX/Avastin 9/8/22. Of note, Dr Meryl Paez attempted debulking surgery in 8/2022 for peritoneal disease but due to adhesions he was unable to debulk. She was recently admitted for GIB 9/12-9/27/22 and had negative EGD but did have abdominal pleurx drain placed 9/26/22 due to recurrent ascites. She saw Dr Fatmata Forrest in clinic on 9/29/22 and due to lack of symptomatic response and progressive ascites, she was felt to have disease progression and therefore recommended changing to paclitaxel/ramicirumab which was planned to start later this week pending improvement in performance status.      Ms Mariano Dudley presented to ED 10/3/2022 with nausea, vomiting and diarrhea. CT AP showed diffuse wall thickening concerning for colitis vs serosal implants of tumor. She was started on CTX and Flagyl and then changed to Levaquin/Flagyl. Of note, scan indicated worsening hydronephrosis with Cr stable at 0.5. UC NGTD. She was due for ureteral stent exchange and Dr Raghu Gonzalez performed 10/6 - he exchanged R stent but unable to exchange L due to possible tumor involvement at L UO and edema. Ascitic fluid was also sent due to fever and symptoms and showed  and she was changed to CTX and flagyl for SBP. ID consulted and agreed with management. She is much improved since admission and nausea/vomiting and abdominal pain improved. She is now ready for DC. She will be discharged on Cipro 500mg BID and flagyl to complete 14 days. She reports she has a refil of pain meds waiting at pharmacy. She will f/u with Dr Margarita Mcduffie as scheduled 10/14. She knows to call sooner with symptoms not managed by medications or other concerns. See details of admission below:     Metastatic carcinoma / peritoneal carcinomatosis / ascites  - Abd Pleurx drain in place - drain per protocol  - Dr Christine Kay paclitaxel/ramicirumab as outpatient later this week; currently no plans to give inpatient and with possible colitis     Abdominal pain / nausea /vomiting / fever  - PRN antiemetics  - CT with possible colitis - change CTX/flagyl to LVQ/flagyl  - Check for C diff if diarrhea recurs  10/5 Tmax 101.7. Infectious work-up to date negative including BC/UC - continues on Levaquin/Flagyl. No diarrhea reported. Will check ascitic fluid. 10/6 Afebrile ON. Bcx - NG. Ucx - MWF. Continues on LVQ/Flagyl.   Ascites fluid culture - NGTD, however cell count on fluid with PMN > 250, concern for SBP - consult ID and change ABX to Cefotaxime 2 mg q8h - not available on formulary - discussed with pharmD and recommended Rocephin 2 g daily, d/c LVQ, continue Flagyl    10/7 BC and ascitic culture negative. Continues CTX/Flagyl - ID agrees. Will change to cipro/flagyl on DC. Nausea improved s/p dexamethasone the other day. Hx of hydronephrosis  - Dr Tomeka Granados planning exchange; would like to complete prior to chemotherapy, if able early next week. 10/5 Discussed with Dr Tomeka Granados. Given fevers, will plan stent exchange tomorrow or over the weekend. 10/6 Stent exchange planned for today. 10/7 R stent exchanged yesterday. L stent unable to exchange due to edema and possible tumor involvement at L UO but Cr remains stable. Hx of DVT   - Continue Eliquis     Anemia  - Monitor, transfuse for Hgb <7    Consults:  IP CONSULT TO ONCOLOGY  IP CONSULT TO PHYSICAL THERAPY  IP CONSULT TO OCCUPATIONAL THERAPY  IP CONSULT TO INFECTIOUS DISEASES    Pertinent Diagnostic Studies:   Labs:    Recent Labs     10/05/22  0401 10/06/22  0432 10/07/22  0433   WBC 9.1 8.8 7.7   HGB 7.1* 7.7* 7.5*    242 248      Recent Labs     10/05/22  0401 10/06/22  0432 10/07/22  0433    139 136   K 3.1* 3.1* 4.0    101 99*   CO2 29 31 28   BUN 3* 3* 5*   MG 1.5* 1.7*  --        Imaging:    CT Result (most recent):  CT CHEST ABDOMEN PELVIS W CONTRAST 10/03/2022    Narrative  CT CHEST ABDOMEN AND PELVIS WITH CONTRAST  10/3/2022 5:15 PM    HISTORY:  PE; hx of PE; left chest pain; diffuse abdominal pain. Peritoneal  carcinomatosis. TECHNIQUE: The patient received 100 mL Isovue-370 nonionic IV contrast and axial  images were obtained through the chest, abdomen, and pelvis. Multiplanar  reformatted images were generated. All CT scans at this facility used dose  modulation, interactive reconstruction and/or weight based dosing when  appropriate to reduce radiation dose to as low as reasonably achievable. COMPARISON:  June 22, 2022    CHEST: There are no filling defects within the main or proximal segmental  pulmonary artery suggestive of emboli.     The thoracic aorta is well-opacified without dissection. There is no thoracic adenopathy. A right-sided chest port is present with  catheter tip in right atrium. Bilateral pleural effusions are present. There is lower lobe dependent  atelectasis. A few groundglass opacities in the right upper lobe may be areas  of active alveolitis (image 48). ABDOMEN: A percutaneous catheter or drain is present in the right upper  quadrant. A surgical staple line along the outer margin of the stomach is  compatible with a sleeve gastrectomy. Gallbladder: Mucosal enhancement. Pericholecystic fluid. No visible stones. Liver: Mild intrahepatic biliary ductal dilatation. No focal hepatic lesions. Pancreas: Normal.    Spleen: Normal.    Adrenal glands: Normal.    Kidneys: Bilateral ureteral stents are present with proximal loops in the  distended renal pelvis and distal loops in the bladder. Bilateral significant  hydronephrosis has progressed since June 2022. Bowel: The appendix is mildly distended. There is diffuse bowel wall thickening  involving the colon and there are several dilated loops small bowel. This  appearance may be caused by serosal tumor implants or colitis. Small volume  ascites is present and there is diffuse heterogeneous enhancement of the omental  fat (image 28) compatible with metastatic omental implants. Retroperitoneum:No adenopathy. Abdominal aorta is normal in caliber. PELVIS:Ascites. Diffuse bladder wall thickening. Hysterectomy. Bones: There are no aggressive osseous lesions. Impression  1. No pulmonary embolism. 2.  Mild biliary ductal dilatation. 3.  Omental tumor implants, ascites and diffuse bowel wall thickening may be  caused by serosal tumor implants or colitis. 4.  Progression of bilateral hydronephrosis.          Medication List        START taking these medications      ciprofloxacin 500 MG tablet  Commonly known as: CIPRO  Take 1 tablet by mouth 2 times daily for 11 days Vitals for the past 8 hrs:   BP Temp Temp src Pulse Resp SpO2   10/07/22 1115 114/87 98.1 °F (36.7 °C) Oral 88 18 98 %   10/07/22 0720 123/82 97.5 °F (36.4 °C) Oral 72 18 99 %     Temp (24hrs), Av.6 °F (36.4 °C), Min:97.4 °F (36.3 °C), Max:98.1 °F (36.7 °C)    10/07 0701 - 10/07 1900  In: 600 [P.O.:600]  Out: 500 [Urine:500]    Physical Exam:  Constitutional: Well developed, well nourished female in no acute distress, sitting comfortably in bed. HEENT: Normocephalic and atraumatic. Oropharynx is clear, mucous membranes are moist. Extraocular muscles are intact. Sclerae anicteric. Neck supple without JVD. No thyromegaly present. Skin Warm and dry. No bruising and no rash noted. No erythema. No pallor. Respiratory Lungs are clear to auscultation bilaterally without wheezes, rales or rhonchi, normal air exchange without accessory muscle use. CVS Normal rate, regular rhythm and normal S1 and S2. No murmurs, gallops, or rubs. Abdomen Soft, nontender and nondistended, normoactive bowel sounds. No palpable mass. No hepatosplenomegaly. Neuro Grossly nonfocal with no obvious sensory or motor deficits. MSK Normal range of motion in general.  No edema and no tenderness. Psych Appropriate mood and affect. 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  Resolved Problems:    * No resolved hospital problems. *      DISPOSITION:    [place] home. Follow-up with Dr Belkys Hatch as scheduled      Over 45 minutes was spent in discharge planning and coordination of care.             Toy Scheuermann, APRN - [facility] [address] Hematology & Oncology  [address] [place] [address]  Office : [phone]  Fax : [phone]     Attending Addendum:  I have personally performed a face to face diagnostic evaluation on this patient. I have reviewed and agree with the care plan as documented by Chayito Dewitt N.P. Patient appears table, heart rate regular without murmurs, abdomen is non-tender, bowel sounds are positive. 75-year-old patient history of metastatic carcinoma of possible GI origin with known peritoneal carcinomatosis, on Avastin-FOLFOX , last dose 9/8/2022. Has a Pleurx drain placement for recurrent ascites. On recent evaluation has been recommended to switch therapy to paclitaxel/ramucirumab. She was hospitalized with nausea, vomiting, diarrhea and abdominal pain. Contrast showed CT CAP with omental tumor implants, ascites as well as diffuse bowel wall thickening possibly caused by serosal implants versus colitis. Pt was treated for colitis and SBP with broad spectrum abx. Urology saw pt and exchanged RT ureteral stent and removed LT ureteral stent. Per ID, pt is complete outpt course of cipro/flagyl x 14 days. Clinically stable for discharge. Oupt f/u as above. I spent 32 minutes on evaluation, management, counseling and discharge planning on patient.                   Nicole Harry MD  [facility] Hematology/Oncology  [address] [id number] [facility]  Office : [phone]  Fax : [phone]

## 2022-10-07 NOTE — OP NOTE
[facility]  OPERATIVE REPORT    Name:  Tayler Elias  MR#:  [id number]  :  1975  ACCOUNT #:  [de-identified]  DATE OF SERVICE:  10/06/2022      AMENDED REPORT:  Revised CSN on 10.07.2022/bjs    PREOPERATIVE DIAGNOSES:  Metastatic carcinoma of unknown origin and bilateral hydronephrosis. POSTOPERATIVE DIAGNOSES:  Metastatic carcinoma of unknown origin and bilateral hydronephrosis. PROCEDURE PERFORMED:  Cystoscopy with exchange of right ureteral stent and removal of left ureteral stent and left retrograde. SURGEON:  Darra Severe, MD     ASSISTANT:  Gerry Wade. ANESTHESIA:  General with LMA. COMPLICATIONS:  None. SPECIMENS REMOVED:  None. IMPLANTS:  Right double-J ureteral stent 7-[place]awk x 22 cm. ESTIMATED BLOOD LOSS:  Minimal.     INDICATIONS FOR THE PROCEDURE:  The patient is a pleasant 79-year-old female with metastatic carcinoma of unknown primary. She has undergone extensive treatment. She has bilateral hydronephrosis and has had ureteral stents in place. She presents to me for exchange of those. FINDINGS:  Both stents were quite encrusted. The wire was not able to pass through the lumen of either stent. On the right side, I was able to pass the wire alongside the stent and remove it. On the left side, that was not successful as there was severe bullous edema around the UO and what looks to be possibly tumor involvement of the UO. The stent was removed to hopefully allow room for the wire to pass and unfortunately we were not able to pass it off the left ureter. A retrograde pyelogram of that side was attempted and I could only visualize the very distal portion of the ureter. There appeared to be abrupt high-grade blockage. PROCEDURE:  After informed consent was obtained, the patient was taken to the operating room #9 in [address] [place] [address].   After induction of general anesthesia and placement of LMA, she was carefully placed in low lithotomy position. Her genitalia were prepped and draped in the usual sterile fashion. Next, I inserted a 22-Lao rigid cystoscope. Her bladder had significant areas of petechiae and severe bullous edema around the left UO. There also appeared to possibly be metastatic tumor around that ureteral orifice as there was a firm vascular whitish colored lesion. This did not look like typical bladder cancer. Both stents were in good position. They were both heavily encrusted. I next grasped the right ureteral stent with a stent grasper and pulled it back to the meatus. It was too encrusted to allow passage of a wire to the lumen. I then reinserted the scope and passed the sensor guidewire alongside the stent. This was done under fluoroscopic guidance. The stent was then removed. Next, over that wire using fluoroscopic guidance and direct view with cystoscopy, I placed a 7-Lao x 22 cm double-J stent. There was a good curl in the upper portion of the collecting system as well as the bladder. We then emptied the bladder and turned our attention to the left side. Again the stent on the left side was grasped and brought to the meatus. I was not able to pass a wire across it. I next attempted to place a sensor wire alongside of the stent. This was unsuccessful. I then elected to remove the stent and hopefully then be able to pass the wire. That was not successful either. I then obtained retrograde pyelogram through an open-ended 5-Lao ureteral catheter and there was contrast in the very distal ureter, but then there appeared to be high-grade obstruction. We tried again with the wire in various positions as well as open-ended catheter and were not successful in obtaining access to her ureter and collecting system on that side. At this time, I elected to abort the procedure and see how she does from a clinical standpoint and how her renal function performs.   She may need percutaneous nephrostomy tube. Cystoscope was then removed. I then placed an 18-[place] Tyler catheter to drain her bladder overnight. She was awakened, LMA was removed, she was taken to recovery room in stable condition. There were no known complications. She will be transferred back to oncology service. If her renal function worsens or she has flank pain, I will recommend a left nephrostomy tube with internalization in the future.      MD FARIHA Conner/EPIFANIO_JOCELIN/BRYN_P  D:  10/06/2022 17:21  T:  10/06/2022 23:42  JOB #:  [id number]

## 2022-10-07 NOTE — CONSULTS
Infectious Disease Consult      Today's Date: 10/6/2022   Admit Date: 10/3/2022    Impression:   Patient is a 52year old who unfortunately, has a history of peritoneal carcinomatosis with unknown primary, admitted with NV    #Nausea and vomiting   #Possible SBP   #Enterocolitis likely tumor induced  - Given that cultures have been negative and cytology is positive for malignant cells, I think her symptoms are primarily driven by malignancy  - There is the possibility of secondary infection due to translocation of bacteria from an inflammed bowel   - Agree with ceftriaxone and metronidazole for now, pending final cultures and will likely continue antimicrobial therapy for 14 days with switch to ciprofloxacin and metronidazole on dc  - I think here nausea and vomiting is likely due to serosal inflammation from tumor burden. Can consider a short course of dexamethasone for this which also has anti-emetic effects    Recommendations  - Continue ceftriaxone and metronidazole until discharge and the switch to ciprofloxacin 500mg BID and metronidazole on dc - complete 14 days  - Follow ascitic fluid cultures  - Can consider low dose dexamethasone to help with malignancy induced inflammatory enterocolitis  - ID will sign off. Thank you for consult, please re-engage if future questions    Anti-infectives:   Ctx - 10/4;10/6 - present  Metronidazole - 10/4 - present  Levofloxacin 10/5-10/6    Subjective:   Date of Consultation:  October 6, 2022  Date of Admission: 10/3/2022   Referring Provider: Dr Leesa Gandhi    Patient is a 52 y.o. female with PMH of peritoneal carcinomatosis, unclear primary, possibly GI, s/p attempted debulking 8/17/22p- failed due to multiple adhesions, malignancy complicated by ureteral obstruction with placement of ureteral stents. Completed FOLFOX/avastin 9/8/22.    Patient also has a history of recurrent ascites, for which she was hospitalized in late September and had a pleur-x drain placed 9/26/22. She had  paracentesis on 9/13/22, cytology showing malignant cells, WBC - 1,105 with PMN 75%. Cultures were negative  It appears she got 3 days of ceftriaxone during that admission from 9/16-9/18. She now presents with nausea and vomiting. CT on admission showed diffuse bowel wall thickening, likely representing serosal tumor with small volume ascites    She had another fluid analysis with 419 WBCs, 79% PMNs, cultures so far negative   She was started on ceftriaxone and metronidazole. Ceftriaxone was switched to levofloxacin for a while then switched back to ceftriaxone on 10/06    Patient had a fever of 101 on 10/5, no white count. She has also had a cystoscopy with bilateral stent removal, right ureteral stent replacement. A urine culture done at the time of admission was positive for >100,000 mixed skin alberta, previous urine cultures have not been positive for a dominant organism. Patient doing much better.  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 ([address]    Bilateral hydronephrosis    Peritoneal carcinoma ([address]    Peritoneal carcinomatosis ([address]    Hematemesis    Current use of long term anticoagulation    Intractable vomiting    Generalized abdominal pain    LFT elevation    Obstruction of both ureters    Abdominal pain    Hypokalemia    Hypomagnesemia    Hypoalbuminemia    GERD (gastroesophageal reflux disease)    Colitis    Intractable vomiting with nausea     Past Medical History:   Diagnosis Date    Anemia     2012-- not a problem since hyst    Colon cancer ([address] dx 2/2022     plans for chemo--- followed by dr Kiki EL-19 2020    no hospitalization    GERD (gastroesophageal reflux disease)     managed with med    History of colonoscopy 2018    Dr. Venice Hand, nl (see media note), R     Hx of blood clots 2022    per pt \"small clot on lung identified by CT scan\"  CT Scan impression:- Nonobstructive pulmonary filling defect involving Left Lower Lobe     Hypotension     asymptomatic    Obstruction of fallopian tube     per pt has \"1 good tube\"    Peritoneal carcinomatosis ([address]     Weight loss     80lbs weight loss after gastric sleeve      Family History   Problem Relation Age of Onset    Heart Disease Maternal Grandmother     Hypertension Maternal Grandmother     Heart Disease Maternal Grandfather     Hypertension Maternal Grandfather     Pulmonary Embolism Neg Hx     Colon Cancer Neg Hx     Deep Vein Thrombosis Neg Hx     Ovarian Cancer Neg Hx     Prostate Cancer Neg Hx     Breast Cancer Neg Hx       Social History     Tobacco Use    Smoking status: Never    Smokeless tobacco: Never   Substance Use Topics    Alcohol use: Not Currently     Past Surgical History:   Procedure Laterality Date     SECTION  2009    CYSTOSCOPY Bilateral 2022    CYSTOSCOPY BILATERAL RETROGRADE PYELOGRAM performed by Chacha Spaulding MD at [address]ilateral [address]22    [address] performed by Chacha Spaulding MD at [address]  2014    gastric sleeve- Choudhari    HYSTERECTOMY (CERVIX STATUS UNKNOWN)      2018    HYSTERECTOMY (CERVIX STATUS UNKNOWN)  2020    TLH w/ Bilateral salpingectomy and left oophorectomy    IR PERC CATH PLEURAL DRAIN W/IMAG  2022    IR PERC CATH PLEURAL DRAIN W/IMAG 2022 [facility]    IR PORT PLACEMENT EQUAL OR GREATER THAN 5 YEARS  2022    IR PORT PLACEMENT EQUAL OR GREATER THAN 5 YEARS  2022    IR PORT PLACEMENT EQUAL OR GREATER THAN 5 YEARS 2022 [facility]    LAPAROTOMY N/A 8/17/2022    EXPLORATORY LAPAROTOMY/ ENTEROENTEROSTOMY performed by Jessica Shay MD at [facility]. [address]  age Italo Cedillo 21s\"    also \"unblocked her FT\"    TONSILLECTOMY      UPPER GASTROINTESTINAL ENDOSCOPY      with dilation    UPPER GASTROINTESTINAL ENDOSCOPY N/A 9/13/2022    EGD ESOPHAGOGASTRODUODENOSCOPY OFL 17 To IR after recovery for Para performed by Marshal Post MD at [address] Bilateral 03/15/2022    cysto      Prior to Admission medications    Medication Sig Start Date End Date Taking? Authorizing Provider   oxyCODONE (ROXICODONE) 5 MG immediate release tablet Take 1 tablet by mouth every 6 hours as needed for Pain for up to 30 days. Intended supply: 3 days. Take lowest dose possible to manage pain  Patient not taking: Reported on 10/4/2022 9/29/22 10/29/22  Eitan Carranza MD   metoclopramide (REGLAN) 5 MG tablet Take 1 tablet by mouth daily 9/28/22   SCOTT Collado NP   magnesium oxide (MAG-OX) 400 MG tablet Take 1 tablet by mouth 2 times daily  Patient not taking: Reported on 10/4/2022 9/27/22   SCOTT Collado NP   sennosides-docusate sodium (SENOKOT-S) 8.6-50 MG tablet Take 2 tablets by mouth daily as needed for Constipation 9/26/22   SCOTT Collado NP   polyethylene glycol (GLYCOLAX) 17 g packet Take 17 g by mouth daily as needed for Constipation 9/26/22 10/26/22  SCOTT Collado NP   sucralfate (CARAFATE) 1 GM tablet Take 1 tablet by mouth 4 times daily 9/26/22   SCOTT Collado NP   pantoprazole (PROTONIX) 40 MG tablet Take 1 tablet by mouth in the morning and at bedtime 9/16/22   SCOTT Lassiter CNP   docusate sodium (COLACE, DULCOLAX) 100 MG CAPS Take 100 mg by mouth 2 times daily 8/21/22   SCOTT Carbajal CNP   apixaban (ELIQUIS) 5 MG TABS tablet Take 1 tablet by mouth in the morning and 1 tablet before bedtime.  7/18/22 9/29/22  Eitan Carranza MD   potassium chloride (KLOR-CON M) 20 the setting       CBC:  Recent Labs     10/04/22  0600 10/05/22  0401 10/06/22  0432   WBC 8.0 9.1 8.8   HGB 7.3* 7.1* 7.7*   HCT 23.7* 23.1* 24.3*    232 242       BMP:  Recent Labs     10/04/22  0600 10/05/22  0401 10/06/22  0432   BUN 6 3* 3*    137 139   K 4.0 3.1* 3.1*   * 102 101   CO2 28 29 31       LFTS:  Recent Labs     10/04/22  0600 10/05/22  0401 10/06/22  0432   ALT 15 11* 10*       Data Review:   Recent Results (from the past 24 hour(s))   Comprehensive Metabolic Panel w/ Reflex to MG    Collection Time: 10/06/22  4:32 AM   Result Value Ref Range    Sodium 139 136 - 145 mmol/L    Potassium 3.1 (L) 3.5 - 5.1 mmol/L    Chloride 101 101 - 110 mmol/L    CO2 31 21 - 32 mmol/L    Anion Gap 7 4 - 13 mmol/L    Glucose 91 65 - 100 mg/dL    BUN 3 (L) 6 - 23 MG/DL    Creatinine 0.40 (L) 0.6 - 1.0 MG/DL    Est, Glom Filt Rate >60 >60 ml/min/1.73m2    Calcium 8.1 (L) 8.3 - 10.4 MG/DL    Total Bilirubin 0.4 0.2 - 1.1 MG/DL    ALT 10 (L) 12 - 65 U/L    AST 6 (L) 15 - 37 U/L    Alk Phosphatase 97 50 - 136 U/L    Total Protein 5.2 (L) 6.3 - 8.2 g/dL    Albumin 2.8 (L) 3.5 - 5.0 g/dL    Globulin 2.4 2.3 - 3.5 g/dL    Albumin/Globulin Ratio 1.2 1.2 - 3.5     CBC with Auto Differential    Collection Time: 10/06/22  4:32 AM   Result Value Ref Range    WBC 8.8 4.3 - 11.1 K/uL    RBC 2.78 (L) 4.05 - 5.2 M/uL    Hemoglobin 7.7 (L) 11.7 - 15.4 g/dL    Hematocrit 24.3 (L) 35.8 - 46.3 %    MCV 87.4 79.6 - 97.8 FL    MCH 27.7 26.1 - 32.9 PG    MCHC 31.7 31.4 - 35.0 g/dL    RDW 15.9 (H) 11.9 - 14.6 %    Platelets 977 923 - 118 K/uL    MPV 10.4 9.4 - 12.3 FL    nRBC 0.00 0.0 - 0.2 K/uL    Differential Type AUTOMATED      Seg Neutrophils 81 (H) 43 - 78 %    Lymphocytes 9 (L) 13 - 44 %    Monocytes 9 4.0 - 12.0 %    Eosinophils % 0 (L) 0.5 - 7.8 %    Basophils 0 0.0 - 2.0 %    Immature Granulocytes 1 0.0 - 5.0 %    Segs Absolute 7.2 1.7 - 8.2 K/UL    Absolute Lymph # 0.8 0.5 - 4.6 K/UL    Absolute Mono # 0.8 0.1 - 1.3 K/UL    Absolute Eos # 0.0 0.0 - 0.8 K/UL    Basophils Absolute 0.0 0.0 - 0.2 K/UL    Absolute Immature Granulocyte 0.1 0.0 - 0.5 K/UL   Magnesium    Collection Time: 10/06/22  4:32 AM   Result Value Ref Range    Magnesium 1.7 (L) 1.8 - 2.4 mg/dL   TYPE AND SCREEN    Collection Time: 10/06/22 10:25 AM   Result Value Ref Range    Crossmatch expiration date 10/09/2022,2359     ABO/Rh A POSITIVE     Antibody Screen NEG         Microbiology:  Reviewed    Studies:  Reviewed    Signed By: Aditya Lynne MD     October 6, 2022

## 2022-10-07 NOTE — PROGRESS NOTES
ACUTE OCCUPATIONAL THERAPY GOALS:   (Developed with and agreed upon by patient and/or caregiver.)  1. Patient will complete lower body bathing and dressing with modified independence and adaptive equipment as needed. 2. Patient will complete toileting with modified independence. 3. Patient will tolerate 30 minutes of OT treatment with 1-2 rest breaks to increase activity tolerance for ADLs. 4. Patient will complete functional transfers with modified independence and adaptive equipment as needed. 5. Patient will complete functional mobility with modified independence and appropriate safety awareness. Timeframe: 7 visits         OCCUPATIONAL THERAPY: Daily Note AM   OT Visit Days: 2   Time  OT Charge Capture  Rehab Caseload Tracker  OT Orders    Sara Salazar is a 52 y.o. female   PRIMARY DIAGNOSIS: Abdominal pain  Hypokalemia [E87.6]  Abdominal pain [R10.9]  Intractable vomiting with nausea [R11.2]  Procedure(s) (LRB):  CYSTOSCOPY BILATERAL URETERAL STENT REMOVAL, RIGHT URETERAL STENT PLACEMENT (Bilateral)  1 Day Post-Op  Inpatient: Payor: Anastasiya Barnes / Plan: Sue Hankins / Product Type: *No Product type* /     ASSESSMENT:     REHAB RECOMMENDATIONS: CURRENT LEVEL OF FUNCTION:  (Most Recently Demonstrated)   Recommendation to date pending progress:  Setting:  Home Health Therapy    Equipment:    Rolling Walker Bathing:  Not Tested  Dressing:  Not Tested  Feeding/Grooming:  Not Tested  Toileting:  Not Tested  Functional Mobility:  Stand by Assist     ASSESSMENT:  Ms. Kim Tompkins is doing well today. Pt presents sitting up in chair upon arrival. Pt was able to perform functional mobility in hallway with RW. Pt more steady with RW. Pt returned to room and to supine. Making progress with goals. Will continue to benefit from skilled OT during stay.        SUBJECTIVE:     Ms. Kim Tompkins states, \"How long do you think it will take to get me back right\"     Social/Functional Type of Home: House  Receives Help From: Family  ADL Assistance: Independent  Ambulation Assistance: Independent  Transfer Assistance: Independent  Active : Yes    OBJECTIVE:     Zachary Medley / Ivy Villalba / Wendy Galvan: NA    RESTRICTIONS/PRECAUTIONS:           PAIN: VITALS / O2:   Pre Treatment:              Post Treatment: 0 Vitals          Oxygen            MOBILITY: I Mod I S SBA CGA Min Mod Max Total  NT x2 Comments:   Bed Mobility    Rolling [] [] [] [] [] [] [] [] [] [x] []    Supine to Sit [] [] [] [] [] [] [] [] [] [x] []    Scooting [] [] [] [] [] [] [] [] [] [x] []    Sit to Supine [] [] [x] [] [] [] [] [] [] [] []    Transfers    Sit to Stand [] [] [x] [] [] [] [] [] [] [] []    Bed to Chair [] [] [] [] [] [] [] [] [] [x] []    Stand to Sit [] [] [x] [] [] [] [] [] [] [] []    Tub/Shower [] [] [] [] [] [] [] [] [] [x] []     Toilet [] [] [] [] [] [] [] [] [] [] []      [] [] [] [] [] [] [] [] [] [] []    I=Independent, Mod I=Modified Independent, S=Supervision/Setup, SBA=Standby Assistance, CGA=Contact Guard Assistance, Min=Minimal Assistance, Mod=Moderate Assistance, Max=Maximal Assistance, Total=Total Assistance, NT=Not Tested    ACTIVITIES OF DAILY LIVING: I Mod I S SBA CGA Min Mod Max Total NT Comments   BASIC ADLs:              Upper Body   Bathing [] [] [] [] [] [] [] [] [] [x]    Lower Body Bathing [] [] [] [] [] [] [] [] [] [x]    Toileting [] [] [] [] [] [] [] [] [] [x]    Upper Body Dressing [] [] [] [] [] [] [] [] [] [x]    Lower Body Dressing [] [] [] [] [] [] [] [] [] [x]    Feeding [] [] [] [] [] [] [] [] [] [x]    Grooming [] [] [] [] [] [] [] [] [] [x]    Personal Device Care [] [] [] [] [] [] [] [] [] [x]    Functional Mobility [] [] [x] [] [] [] [] [] [] []    I=Independent, Mod I=Modified Independent, S=Supervision/Setup, SBA=Standby Assistance, CGA=Contact Guard Assistance, Min=Minimal Assistance, Mod=Moderate Assistance, Max=Maximal Assistance, Total=Total Assistance, NT=Not Tested    BALANCE: Good Fair+ Fair Fair- Poor NT Comments Sitting Static [] [x] [] [] [] []    Sitting Dynamic [] [x] [] [] [] []              Standing Static [] [x] [] [] [] []    Standing Dynamic [] [] [x] [] [] []        PLAN:     FREQUENCY/DURATION   OT Plan of Care: 3 times/week for duration of hospital stay or until stated goals are met, whichever comes first.    TREATMENT:     TREATMENT:   Therapeutic Activity (10 Minutes): Therapeutic activity included Sit to Supine, Ambulation on level ground, Sitting balance , and Standing balance to improve functional Activity tolerance, Balance, Coordination, Mobility, and Strength.     TREATMENT GRID:  N/A    AFTER TREATMENT PRECAUTIONS: Bed, Bed/Chair Locked, Call light within reach, and Needs within reach    INTERDISCIPLINARY COLLABORATION:  RN/ PCT and OT/ DURAN    EDUCATION:       TOTAL TREATMENT DURATION AND TIME:  Time In: 1055  Time Out: [address]  Minutes: 10    EVI Moore

## 2022-10-08 VITALS
SYSTOLIC BLOOD PRESSURE: 115 MMHG | TEMPERATURE: 98.3 F | RESPIRATION RATE: 15 BRPM | WEIGHT: 113.7 LBS | BODY MASS INDEX: 19.41 KG/M2 | DIASTOLIC BLOOD PRESSURE: 82 MMHG | OXYGEN SATURATION: 98 % | HEART RATE: 79 BPM | HEIGHT: 64 IN

## 2022-10-08 LAB
ALBUMIN SERPL-MCNC: 2.6 G/DL (ref 3.5–5)
ALBUMIN/GLOB SERPL: 1 {RATIO} (ref 1.2–3.5)
ALP SERPL-CCNC: 95 U/L (ref 50–136)
ALT SERPL-CCNC: 9 U/L (ref 12–65)
ANION GAP SERPL CALC-SCNC: 6 MMOL/L (ref 4–13)
AST SERPL-CCNC: 9 U/L (ref 15–37)
BACTERIA SPEC CULT: NORMAL
BACTERIA SPEC CULT: NORMAL
BASOPHILS # BLD: 0 K/UL (ref 0–0.2)
BASOPHILS NFR BLD: 0 % (ref 0–2)
BILIRUB SERPL-MCNC: 0.3 MG/DL (ref 0.2–1.1)
BUN SERPL-MCNC: 5 MG/DL (ref 6–23)
CALCIUM SERPL-MCNC: 8.1 MG/DL (ref 8.3–10.4)
CHLORIDE SERPL-SCNC: 100 MMOL/L (ref 101–110)
CO2 SERPL-SCNC: 31 MMOL/L (ref 21–32)
CREAT SERPL-MCNC: 0.5 MG/DL (ref 0.6–1)
DIFFERENTIAL METHOD BLD: ABNORMAL
EOSINOPHIL # BLD: 0 K/UL (ref 0–0.8)
EOSINOPHIL NFR BLD: 0 % (ref 0.5–7.8)
ERYTHROCYTE [DISTWIDTH] IN BLOOD BY AUTOMATED COUNT: 16.3 % (ref 11.9–14.6)
GLOBULIN SER CALC-MCNC: 2.7 G/DL (ref 2.3–3.5)
GLUCOSE SERPL-MCNC: 92 MG/DL (ref 65–100)
HCT VFR BLD AUTO: 23.4 % (ref 35.8–46.3)
HGB BLD-MCNC: 7.6 G/DL (ref 11.7–15.4)
IMM GRANULOCYTES # BLD AUTO: 0.1 K/UL (ref 0–0.5)
IMM GRANULOCYTES NFR BLD AUTO: 1 % (ref 0–5)
LYMPHOCYTES # BLD: 1 K/UL (ref 0.5–4.6)
LYMPHOCYTES NFR BLD: 12 % (ref 13–44)
MCH RBC QN AUTO: 28 PG (ref 26.1–32.9)
MCHC RBC AUTO-ENTMCNC: 32.5 G/DL (ref 31.4–35)
MCV RBC AUTO: 86.3 FL (ref 79.6–97.8)
MONOCYTES # BLD: 0.8 K/UL (ref 0.1–1.3)
MONOCYTES NFR BLD: 9 % (ref 4–12)
NEUTS SEG # BLD: 6.4 K/UL (ref 1.7–8.2)
NEUTS SEG NFR BLD: 78 % (ref 43–78)
NRBC # BLD: 0 K/UL (ref 0–0.2)
PLATELET # BLD AUTO: 273 K/UL (ref 150–450)
PMV BLD AUTO: 10.9 FL (ref 9.4–12.3)
POTASSIUM SERPL-SCNC: 3.1 MMOL/L (ref 3.5–5.1)
PROT SERPL-MCNC: 5.3 G/DL (ref 6.3–8.2)
RBC # BLD AUTO: 2.71 M/UL (ref 4.05–5.2)
SERVICE CMNT-IMP: NORMAL
SERVICE CMNT-IMP: NORMAL
SODIUM SERPL-SCNC: 137 MMOL/L (ref 136–145)
WBC # BLD AUTO: 8.3 K/UL (ref 4.3–11.1)

## 2022-10-08 PROCEDURE — 6360000002 HC RX W HCPCS: Performed by: FAMILY MEDICINE

## 2022-10-08 PROCEDURE — 99239 HOSP IP/OBS DSCHRG MGMT >30: CPT | Performed by: INTERNAL MEDICINE

## 2022-10-08 PROCEDURE — 80053 COMPREHEN METABOLIC PANEL: CPT

## 2022-10-08 PROCEDURE — 6360000002 HC RX W HCPCS: Performed by: NURSE PRACTITIONER

## 2022-10-08 PROCEDURE — 85025 COMPLETE CBC W/AUTO DIFF WBC: CPT

## 2022-10-08 PROCEDURE — 2580000003 HC RX 258: Performed by: FAMILY MEDICINE

## 2022-10-08 PROCEDURE — 6370000000 HC RX 637 (ALT 250 FOR IP): Performed by: FAMILY MEDICINE

## 2022-10-08 PROCEDURE — 6360000002 HC RX W HCPCS: Performed by: INTERNAL MEDICINE

## 2022-10-08 RX ORDER — POTASSIUM CHLORIDE 29.8 MG/ML
20 INJECTION INTRAVENOUS
Status: COMPLETED | OUTPATIENT
Start: 2022-10-08 | End: 2022-10-08

## 2022-10-08 RX ADMIN — POTASSIUM CHLORIDE 20 MEQ: 29.8 INJECTION, SOLUTION INTRAVENOUS at 08:40

## 2022-10-08 RX ADMIN — SODIUM CHLORIDE, PRESERVATIVE FREE 10 ML: 5 INJECTION INTRAVENOUS at 08:41

## 2022-10-08 RX ADMIN — Medication 250 MG: at 08:40

## 2022-10-08 RX ADMIN — POTASSIUM CHLORIDE 20 MEQ: 29.8 INJECTION, SOLUTION INTRAVENOUS at 06:31

## 2022-10-08 RX ADMIN — ONDANSETRON 4 MG: 2 INJECTION INTRAMUSCULAR; INTRAVENOUS at 10:59

## 2022-10-08 RX ADMIN — APIXABAN 5 MG: 5 TABLET, FILM COATED ORAL at 08:40

## 2022-10-08 RX ADMIN — METRONIDAZOLE 500 MG: 500 TABLET ORAL at 05:54

## 2022-10-08 RX ADMIN — MORPHINE SULFATE 2 MG: 2 INJECTION, SOLUTION INTRAMUSCULAR; INTRAVENOUS at 10:59

## 2022-10-08 RX ADMIN — PROCHLORPERAZINE EDISYLATE 10 MG: 5 INJECTION INTRAMUSCULAR; INTRAVENOUS at 06:31

## 2022-10-08 RX ADMIN — METOCLOPRAMIDE 5 MG: 10 TABLET ORAL at 08:40

## 2022-10-08 RX ADMIN — PANTOPRAZOLE SODIUM 40 MG: 40 TABLET, DELAYED RELEASE ORAL at 08:40

## 2022-10-08 RX ADMIN — MORPHINE SULFATE 2 MG: 2 INJECTION, SOLUTION INTRAMUSCULAR; INTRAVENOUS at 06:31

## 2022-10-08 ASSESSMENT — PAIN SCALES - GENERAL
PAINLEVEL_OUTOF10: 6
PAINLEVEL_OUTOF10: 0
PAINLEVEL_OUTOF10: 0

## 2022-10-08 ASSESSMENT — PAIN DESCRIPTION - DESCRIPTORS: DESCRIPTORS: ACHING

## 2022-10-08 ASSESSMENT — PAIN DESCRIPTION - ORIENTATION: ORIENTATION: ANTERIOR

## 2022-10-08 ASSESSMENT — PAIN DESCRIPTION - LOCATION: LOCATION: FLANK

## 2022-10-08 NOTE — CARE COORDINATION
Patient has d/c orders for today. Patient has a referral by persamantha CM to [address] [place] [address]. Referral completed. Family will transport patient home. Patient has met all treatment goals / milestones. CM will continue to monitor and remain available for any needs, concerns or questions that may arise. 10/04/22 8546   Service Assessment   Patient Orientation Alert and Oriented   Cognition Alert   Primary Caregiver Family   Support Systems Family Members   Patient's Healthcare Decision Maker is: [facility]   PCP Verified by CM Yes   Last Visit to PCP Within last 3 months   Can patient return to prior living arrangement Unknown at present   Ability to make needs known: Good   Family able to assist with home care needs: Yes   Social/Functional History   Type of [facility] [place] Help From Family   ADL Assistance Independent   Ambulation Assistance Independent   Transfer Assistance Independent   Active  Yes   Discharge Planning   Patient expects to be discharged to: Unknown   Services At/After Discharge   [facility] Information Provided? No   Mode of Transport at Discharge Self   Confirm Follow Up Transport Family   Condition of Participation: Discharge Planning   Freedom of Choice list was provided with basic dialogue that supports the patient's individualized plan of care/goals, treatment preferences, and shares the quality data associated with the providers?   Yes

## 2022-10-10 ENCOUNTER — TELEPHONE (OUTPATIENT)
Dept: INTERNAL MEDICINE CLINIC | Facility: CLINIC | Age: 47
End: 2022-10-10

## 2022-10-10 ENCOUNTER — TELEPHONE (OUTPATIENT)
Dept: ONCOLOGY | Age: 47
End: 2022-10-10

## 2022-10-10 DIAGNOSIS — G62.9 NEUROPATHY: Primary | ICD-10-CM

## 2022-10-10 RX ORDER — GABAPENTIN 300 MG/1
300 CAPSULE ORAL 3 TIMES DAILY
Qty: 45 CAPSULE | Refills: 0 | Status: ON HOLD | OUTPATIENT
Start: 2022-10-10 | End: 2022-10-26 | Stop reason: HOSPADM

## 2022-10-10 NOTE — TELEPHONE ENCOUNTER
Called patient to schedule TCV appt following discharge from [facility] 10/08/2022. Dx Hypokalemia [E87.6]  Abdominal pain [R10.9]  Intractable vomiting with nausea [R11.2]    Patient has follow up appt with Dr. Aileen Leonard 10/14/2022. As per last 2 discharge calls Patient states she does not want to establish with PCP at this time. Patient states she would like [facility] ordered and I advised her she may need a PCP to refer her to [facility] and follow [facility] orders. Advised she can ask Dr Aileen Leonard if he would be able to order and follow for her. Advised to call me back if she would like to schedule with a PCP.

## 2022-10-10 NOTE — TELEPHONE ENCOUNTER
Patient is home from hospital and continues to need skilled nursing . The verbal order is given to resume home healthcare.

## 2022-10-10 NOTE — TELEPHONE ENCOUNTER
Nguyen from [facility] calling on the behalf of the PT. .  Regarding verbal orders to continue home healthcare    Nguyen  [phone]

## 2022-10-11 ENCOUNTER — CLINICAL DOCUMENTATION (OUTPATIENT)
Dept: CASE MANAGEMENT | Age: 47
End: 2022-10-11

## 2022-10-11 ENCOUNTER — HOSPITAL ENCOUNTER (OUTPATIENT)
Dept: INFUSION THERAPY | Age: 47
Discharge: HOME | End: 2022-10-11
Payer: COMMERCIAL

## 2022-10-11 VITALS
TEMPERATURE: 97.5 F | SYSTOLIC BLOOD PRESSURE: 137 MMHG | HEART RATE: 78 BPM | DIASTOLIC BLOOD PRESSURE: 64 MMHG | RESPIRATION RATE: 18 BRPM | OXYGEN SATURATION: 100 %

## 2022-10-11 DIAGNOSIS — C78.6 METASTASIS TO PERITONEUM OF UNKNOWN PRIMARY (HCC): Primary | ICD-10-CM

## 2022-10-11 DIAGNOSIS — C80.1 METASTASIS TO PERITONEUM OF UNKNOWN PRIMARY (HCC): Primary | ICD-10-CM

## 2022-10-11 LAB
ALBUMIN SERPL-MCNC: 2.8 G/DL (ref 3.5–5)
ALBUMIN/GLOB SERPL: 0.8 {RATIO} (ref 0.4–1.6)
ALP SERPL-CCNC: 90 U/L (ref 50–136)
ALT SERPL-CCNC: 11 U/L (ref 12–65)
ANION GAP SERPL CALC-SCNC: 9 MMOL/L (ref 2–11)
AST SERPL-CCNC: 10 U/L (ref 15–37)
BASOPHILS # BLD: 0 K/UL (ref 0–0.2)
BASOPHILS NFR BLD: 0 % (ref 0–2)
BILIRUB SERPL-MCNC: 0.4 MG/DL (ref 0.2–1.1)
BUN SERPL-MCNC: 3 MG/DL (ref 6–23)
CALCIUM SERPL-MCNC: 7.9 MG/DL (ref 8.3–10.4)
CHLORIDE SERPL-SCNC: 94 MMOL/L (ref 101–110)
CO2 SERPL-SCNC: 31 MMOL/L (ref 21–32)
CREAT SERPL-MCNC: 0.4 MG/DL (ref 0.6–1)
DIFFERENTIAL METHOD BLD: ABNORMAL
EOSINOPHIL # BLD: 0 K/UL (ref 0–0.8)
EOSINOPHIL NFR BLD: 0 % (ref 0.5–7.8)
ERYTHROCYTE [DISTWIDTH] IN BLOOD BY AUTOMATED COUNT: 16.8 % (ref 11.9–14.6)
GLOBULIN SER CALC-MCNC: 3.4 G/DL (ref 2.8–4.5)
GLUCOSE SERPL-MCNC: 96 MG/DL (ref 65–100)
HCT VFR BLD AUTO: 26.3 % (ref 35.8–46.3)
HGB BLD-MCNC: 8.3 G/DL (ref 11.7–15.4)
IMM GRANULOCYTES # BLD AUTO: 0.1 K/UL (ref 0–0.5)
IMM GRANULOCYTES NFR BLD AUTO: 1 % (ref 0–5)
LYMPHOCYTES # BLD: 1.4 K/UL (ref 0.5–4.6)
LYMPHOCYTES NFR BLD: 14 % (ref 13–44)
MCH RBC QN AUTO: 27.3 PG (ref 26.1–32.9)
MCHC RBC AUTO-ENTMCNC: 31.6 G/DL (ref 31.4–35)
MCV RBC AUTO: 86.5 FL (ref 82–102)
MONOCYTES # BLD: 0.9 K/UL (ref 0.1–1.3)
MONOCYTES NFR BLD: 9 % (ref 4–12)
NEUTS SEG # BLD: 7.6 K/UL (ref 1.7–8.2)
NEUTS SEG NFR BLD: 76 % (ref 43–78)
NRBC # BLD: 0 K/UL (ref 0–0.2)
PLATELET # BLD AUTO: 315 K/UL (ref 150–450)
PMV BLD AUTO: 9.8 FL (ref 9.4–12.3)
POTASSIUM SERPL-SCNC: 2.7 MMOL/L (ref 3.5–5.1)
PROT SERPL-MCNC: 6.2 G/DL (ref 6.3–8.2)
RBC # BLD AUTO: 3.04 M/UL (ref 4.05–5.2)
SODIUM SERPL-SCNC: 134 MMOL/L (ref 133–143)
WBC # BLD AUTO: 10 K/UL (ref 4.3–11.1)

## 2022-10-11 PROCEDURE — 80053 COMPREHEN METABOLIC PANEL: CPT

## 2022-10-11 PROCEDURE — 85025 COMPLETE CBC W/AUTO DIFF WBC: CPT

## 2022-10-11 PROCEDURE — 96365 THER/PROPH/DIAG IV INF INIT: CPT

## 2022-10-11 PROCEDURE — 2580000003 HC RX 258: Performed by: INTERNAL MEDICINE

## 2022-10-11 PROCEDURE — 96361 HYDRATE IV INFUSION ADD-ON: CPT

## 2022-10-11 PROCEDURE — 6360000002 HC RX W HCPCS: Performed by: NURSE PRACTITIONER

## 2022-10-11 RX ORDER — POTASSIUM CHLORIDE 7.45 MG/ML
10 INJECTION INTRAVENOUS ONCE
Status: COMPLETED | OUTPATIENT
Start: 2022-10-11 | End: 2022-10-11

## 2022-10-11 RX ORDER — 0.9 % SODIUM CHLORIDE 0.9 %
1000 INTRAVENOUS SOLUTION INTRAVENOUS ONCE
Status: COMPLETED | OUTPATIENT
Start: 2022-10-11 | End: 2022-10-11

## 2022-10-11 RX ORDER — 0.9 % SODIUM CHLORIDE 0.9 %
1000 INTRAVENOUS SOLUTION INTRAVENOUS ONCE
Status: CANCELLED | OUTPATIENT
Start: 2022-10-11 | End: 2022-10-11

## 2022-10-11 RX ADMIN — POTASSIUM CHLORIDE 10 MEQ: 7.46 INJECTION, SOLUTION INTRAVENOUS at 16:55

## 2022-10-11 RX ADMIN — SODIUM CHLORIDE 1000 ML: 9 INJECTION, SOLUTION INTRAVENOUS at 15:45

## 2022-10-11 NOTE — PROGRESS NOTES
Arrived to the [facility]. Labs collected and IVF bolus completed, potassium infusing. Patient tolerated well. Any issues or concerns during appointment: K 2.7, per Ketan Romero NP, 20 meq IV today and patient to double dose at home to 40 meq bid, pt aware. Patient aware of next infusion appointment on 10/13/2022 (date) at 0900 (time). Patient aware of next lab and [facility] office visit on 10/13/2022 (date) at 0800 (time). Patient instructed to call provider with temperature of 100.4 or greater or nausea/vomiting/ diarrhea or pain not controlled by medications  Report given to [facility].

## 2022-10-11 NOTE — PROGRESS NOTES
Report received from [place], RN Potassium bolus completed, pt tolerated well, discharged home via wheelchair

## 2022-10-13 ENCOUNTER — HOSPITAL ENCOUNTER (OUTPATIENT)
Dept: INFUSION THERAPY | Age: 47
Discharge: HOME | End: 2022-10-13
Payer: COMMERCIAL

## 2022-10-13 ENCOUNTER — CLINICAL DOCUMENTATION (OUTPATIENT)
Dept: CASE MANAGEMENT | Age: 47
End: 2022-10-13

## 2022-10-13 ENCOUNTER — OFFICE VISIT (OUTPATIENT)
Dept: ONCOLOGY | Age: 47
End: 2022-10-13
Payer: COMMERCIAL

## 2022-10-13 VITALS
DIASTOLIC BLOOD PRESSURE: 85 MMHG | SYSTOLIC BLOOD PRESSURE: 103 MMHG | HEIGHT: 64 IN | RESPIRATION RATE: 16 BRPM | OXYGEN SATURATION: 98 % | HEART RATE: 109 BPM | WEIGHT: 105.5 LBS | TEMPERATURE: 98.1 F | BODY MASS INDEX: 18.01 KG/M2

## 2022-10-13 DIAGNOSIS — C78.6 METASTASIS TO PERITONEUM OF UNKNOWN PRIMARY (HCC): ICD-10-CM

## 2022-10-13 DIAGNOSIS — C80.1 METASTASIS TO PERITONEUM OF UNKNOWN PRIMARY (HCC): ICD-10-CM

## 2022-10-13 DIAGNOSIS — C78.6 PERITONEAL CARCINOMATOSIS (HCC): ICD-10-CM

## 2022-10-13 DIAGNOSIS — C80.1 CARCINOMA OF UNKNOWN PRIMARY (HCC): ICD-10-CM

## 2022-10-13 DIAGNOSIS — T45.1X5A CINV (CHEMOTHERAPY-INDUCED NAUSEA AND VOMITING): ICD-10-CM

## 2022-10-13 DIAGNOSIS — C78.6 PERITONEAL CARCINOMATOSIS (HCC): Primary | ICD-10-CM

## 2022-10-13 DIAGNOSIS — C80.1 CARCINOMA OF UNKNOWN PRIMARY (HCC): Primary | ICD-10-CM

## 2022-10-13 DIAGNOSIS — R11.2 CINV (CHEMOTHERAPY-INDUCED NAUSEA AND VOMITING): ICD-10-CM

## 2022-10-13 LAB
ALBUMIN SERPL-MCNC: 2.7 G/DL (ref 3.5–5)
ALBUMIN/GLOB SERPL: 0.8 {RATIO} (ref 0.4–1.6)
ALP SERPL-CCNC: 84 U/L (ref 50–136)
ALT SERPL-CCNC: 12 U/L (ref 12–65)
ANION GAP SERPL CALC-SCNC: 8 MMOL/L (ref 2–11)
AST SERPL-CCNC: 11 U/L (ref 15–37)
BASOPHILS # BLD: 0 K/UL (ref 0–0.2)
BASOPHILS NFR BLD: 0 % (ref 0–2)
BILIRUB SERPL-MCNC: 0.4 MG/DL (ref 0.2–1.1)
BUN SERPL-MCNC: 4 MG/DL (ref 6–23)
CALCIUM SERPL-MCNC: 8.4 MG/DL (ref 8.3–10.4)
CHLORIDE SERPL-SCNC: 99 MMOL/L (ref 101–110)
CO2 SERPL-SCNC: 28 MMOL/L (ref 21–32)
CREAT SERPL-MCNC: 0.4 MG/DL (ref 0.6–1)
CREAT UR-MCNC: 75 MG/DL
DIFFERENTIAL METHOD BLD: ABNORMAL
EOSINOPHIL # BLD: 0 K/UL (ref 0–0.8)
EOSINOPHIL NFR BLD: 0 % (ref 0.5–7.8)
ERYTHROCYTE [DISTWIDTH] IN BLOOD BY AUTOMATED COUNT: 16.8 % (ref 11.9–14.6)
GLOBULIN SER CALC-MCNC: 3.5 G/DL (ref 2.8–4.5)
GLUCOSE SERPL-MCNC: 109 MG/DL (ref 65–100)
HBV SURFACE AB SERPL IA-ACNC: <3.1 MIU/ML
HBV SURFACE AG SER QL: NONREACTIVE
HCT VFR BLD AUTO: 26.3 % (ref 35.8–46.3)
HGB BLD-MCNC: 8.2 G/DL (ref 11.7–15.4)
IMM GRANULOCYTES # BLD AUTO: 0.1 K/UL (ref 0–0.5)
IMM GRANULOCYTES NFR BLD AUTO: 1 % (ref 0–5)
LYMPHOCYTES # BLD: 1 K/UL (ref 0.5–4.6)
LYMPHOCYTES NFR BLD: 11 % (ref 13–44)
MAGNESIUM SERPL-MCNC: 1.3 MG/DL (ref 1.8–2.4)
MCH RBC QN AUTO: 27.3 PG (ref 26.1–32.9)
MCHC RBC AUTO-ENTMCNC: 31.2 G/DL (ref 31.4–35)
MCV RBC AUTO: 87.7 FL (ref 82–102)
MONOCYTES # BLD: 0.8 K/UL (ref 0.1–1.3)
MONOCYTES NFR BLD: 8 % (ref 4–12)
NEUTS SEG # BLD: 7.4 K/UL (ref 1.7–8.2)
NEUTS SEG NFR BLD: 80 % (ref 43–78)
NRBC # BLD: 0 K/UL (ref 0–0.2)
PLATELET # BLD AUTO: 312 K/UL (ref 150–450)
PMV BLD AUTO: 9.4 FL (ref 9.4–12.3)
POTASSIUM SERPL-SCNC: 3.6 MMOL/L (ref 3.5–5.1)
PROT SERPL-MCNC: 6.2 G/DL (ref 6.3–8.2)
PROT UR-MCNC: 100 MG/DL
PROT UR-MCNC: 105 MG/DL
PROT/CREAT UR-RTO: 1.3
RBC # BLD AUTO: 3 M/UL (ref 4.05–5.2)
SODIUM SERPL-SCNC: 135 MMOL/L (ref 133–143)
WBC # BLD AUTO: 9.2 K/UL (ref 4.3–11.1)

## 2022-10-13 PROCEDURE — 85025 COMPLETE CBC W/AUTO DIFF WBC: CPT

## 2022-10-13 PROCEDURE — 96417 CHEMO IV INFUS EACH ADDL SEQ: CPT

## 2022-10-13 PROCEDURE — 6360000002 HC RX W HCPCS: Performed by: INTERNAL MEDICINE

## 2022-10-13 PROCEDURE — 2500000003 HC RX 250 WO HCPCS: Performed by: INTERNAL MEDICINE

## 2022-10-13 PROCEDURE — 99215 OFFICE O/P EST HI 40 MIN: CPT | Performed by: INTERNAL MEDICINE

## 2022-10-13 PROCEDURE — 2580000003 HC RX 258: Performed by: INTERNAL MEDICINE

## 2022-10-13 PROCEDURE — 36591 DRAW BLOOD OFF VENOUS DEVICE: CPT

## 2022-10-13 PROCEDURE — 83735 ASSAY OF MAGNESIUM: CPT

## 2022-10-13 PROCEDURE — 87340 HEPATITIS B SURFACE AG IA: CPT

## 2022-10-13 PROCEDURE — 96413 CHEMO IV INFUSION 1 HR: CPT

## 2022-10-13 PROCEDURE — A4216 STERILE WATER/SALINE, 10 ML: HCPCS | Performed by: INTERNAL MEDICINE

## 2022-10-13 PROCEDURE — 84156 ASSAY OF PROTEIN URINE: CPT

## 2022-10-13 PROCEDURE — 80053 COMPREHEN METABOLIC PANEL: CPT

## 2022-10-13 PROCEDURE — 96367 TX/PROPH/DG ADDL SEQ IV INF: CPT

## 2022-10-13 PROCEDURE — 82570 ASSAY OF URINE CREATININE: CPT

## 2022-10-13 PROCEDURE — 96366 THER/PROPH/DIAG IV INF ADDON: CPT

## 2022-10-13 PROCEDURE — 86704 HEP B CORE ANTIBODY TOTAL: CPT

## 2022-10-13 PROCEDURE — 96375 TX/PRO/DX INJ NEW DRUG ADDON: CPT

## 2022-10-13 PROCEDURE — 86706 HEP B SURFACE ANTIBODY: CPT

## 2022-10-13 RX ORDER — FAMOTIDINE 10 MG/ML
20 INJECTION, SOLUTION INTRAVENOUS
Status: CANCELLED | OUTPATIENT
Start: 2022-10-13

## 2022-10-13 RX ORDER — SODIUM CHLORIDE 9 MG/ML
5-250 INJECTION, SOLUTION INTRAVENOUS PRN
Status: CANCELLED | OUTPATIENT
Start: 2022-10-13

## 2022-10-13 RX ORDER — EPINEPHRINE 1 MG/ML
0.3 INJECTION, SOLUTION, CONCENTRATE INTRAVENOUS PRN
Status: CANCELLED | OUTPATIENT
Start: 2022-10-13

## 2022-10-13 RX ORDER — DEXAMETHASONE SODIUM PHOSPHATE 10 MG/ML
10 INJECTION INTRAMUSCULAR; INTRAVENOUS ONCE
Status: COMPLETED | OUTPATIENT
Start: 2022-10-13 | End: 2022-10-13

## 2022-10-13 RX ORDER — SODIUM CHLORIDE 9 MG/ML
5-250 INJECTION, SOLUTION INTRAVENOUS PRN
Status: DISCONTINUED | OUTPATIENT
Start: 2022-10-13 | End: 2022-10-14 | Stop reason: HOSPADM

## 2022-10-13 RX ORDER — ALBUTEROL SULFATE 90 UG/1
4 AEROSOL, METERED RESPIRATORY (INHALATION) PRN
Status: CANCELLED | OUTPATIENT
Start: 2022-10-13

## 2022-10-13 RX ORDER — ONDANSETRON 2 MG/ML
8 INJECTION INTRAMUSCULAR; INTRAVENOUS
Status: CANCELLED | OUTPATIENT
Start: 2022-10-13

## 2022-10-13 RX ORDER — DIPHENHYDRAMINE HYDROCHLORIDE 50 MG/ML
50 INJECTION INTRAMUSCULAR; INTRAVENOUS ONCE
Status: CANCELLED | OUTPATIENT
Start: 2022-10-13 | End: 2022-10-13

## 2022-10-13 RX ORDER — MAGNESIUM SULFATE IN WATER 40 MG/ML
4000 INJECTION, SOLUTION INTRAVENOUS ONCE
Status: COMPLETED | OUTPATIENT
Start: 2022-10-13 | End: 2022-10-13

## 2022-10-13 RX ORDER — SODIUM CHLORIDE 9 MG/ML
INJECTION, SOLUTION INTRAVENOUS CONTINUOUS
Status: CANCELLED | OUTPATIENT
Start: 2022-10-13

## 2022-10-13 RX ORDER — SODIUM CHLORIDE 0.9 % (FLUSH) 0.9 %
5-40 SYRINGE (ML) INJECTION PRN
Status: DISCONTINUED | OUTPATIENT
Start: 2022-10-13 | End: 2022-10-14 | Stop reason: HOSPADM

## 2022-10-13 RX ORDER — ONDANSETRON 2 MG/ML
8 INJECTION INTRAMUSCULAR; INTRAVENOUS ONCE
Status: COMPLETED | OUTPATIENT
Start: 2022-10-13 | End: 2022-10-13

## 2022-10-13 RX ORDER — ACETAMINOPHEN 325 MG/1
650 TABLET ORAL
Status: CANCELLED | OUTPATIENT
Start: 2022-10-13

## 2022-10-13 RX ORDER — SODIUM CHLORIDE 0.9 % (FLUSH) 0.9 %
5-40 SYRINGE (ML) INJECTION PRN
Status: CANCELLED | OUTPATIENT
Start: 2022-10-13

## 2022-10-13 RX ORDER — SODIUM CHLORIDE 9 MG/ML
5-40 INJECTION INTRAVENOUS PRN
Status: CANCELLED | OUTPATIENT
Start: 2022-10-13

## 2022-10-13 RX ORDER — ONDANSETRON 2 MG/ML
8 INJECTION INTRAMUSCULAR; INTRAVENOUS ONCE
Status: CANCELLED | OUTPATIENT
Start: 2022-10-13 | End: 2022-10-13

## 2022-10-13 RX ORDER — MEPERIDINE HYDROCHLORIDE 50 MG/ML
12.5 INJECTION INTRAMUSCULAR; INTRAVENOUS; SUBCUTANEOUS PRN
Status: CANCELLED | OUTPATIENT
Start: 2022-10-13

## 2022-10-13 RX ORDER — DIPHENHYDRAMINE HYDROCHLORIDE 50 MG/ML
50 INJECTION INTRAMUSCULAR; INTRAVENOUS
Status: CANCELLED | OUTPATIENT
Start: 2022-10-13

## 2022-10-13 RX ORDER — DIPHENHYDRAMINE HYDROCHLORIDE 50 MG/ML
50 INJECTION INTRAMUSCULAR; INTRAVENOUS
Status: DISCONTINUED | OUTPATIENT
Start: 2022-10-13 | End: 2022-10-14 | Stop reason: HOSPADM

## 2022-10-13 RX ORDER — DIPHENHYDRAMINE HYDROCHLORIDE 50 MG/ML
50 INJECTION INTRAMUSCULAR; INTRAVENOUS ONCE
Status: COMPLETED | OUTPATIENT
Start: 2022-10-13 | End: 2022-10-13

## 2022-10-13 RX ORDER — HEPARIN SODIUM (PORCINE) LOCK FLUSH IV SOLN 100 UNIT/ML 100 UNIT/ML
500 SOLUTION INTRAVENOUS PRN
Status: CANCELLED | OUTPATIENT
Start: 2022-10-13

## 2022-10-13 RX ORDER — FAMOTIDINE 10 MG/ML
20 INJECTION, SOLUTION INTRAVENOUS ONCE
Status: CANCELLED | OUTPATIENT
Start: 2022-10-13 | End: 2022-10-13

## 2022-10-13 RX ORDER — MEPERIDINE HYDROCHLORIDE 25 MG/ML
12.5 INJECTION INTRAMUSCULAR; INTRAVENOUS; SUBCUTANEOUS PRN
Status: DISCONTINUED | OUTPATIENT
Start: 2022-10-13 | End: 2022-10-14 | Stop reason: HOSPADM

## 2022-10-13 RX ORDER — SODIUM CHLORIDE 0.9 % (FLUSH) 0.9 %
10 SYRINGE (ML) INJECTION PRN
Status: DISCONTINUED | OUTPATIENT
Start: 2022-10-13 | End: 2022-10-14 | Stop reason: HOSPADM

## 2022-10-13 RX ADMIN — ONDANSETRON 8 MG: 2 INJECTION INTRAMUSCULAR; INTRAVENOUS at 12:30

## 2022-10-13 RX ADMIN — SODIUM CHLORIDE 383 MG: 9 INJECTION, SOLUTION INTRAVENOUS at 11:30

## 2022-10-13 RX ADMIN — Medication 10 ML: at 08:15

## 2022-10-13 RX ADMIN — MAGNESIUM SULFATE HEPTAHYDRATE 4000 MG: 40 INJECTION, SOLUTION INTRAVENOUS at 09:47

## 2022-10-13 RX ADMIN — DIPHENHYDRAMINE HYDROCHLORIDE 50 MG: 50 INJECTION, SOLUTION INTRAMUSCULAR; INTRAVENOUS at 10:51

## 2022-10-13 RX ADMIN — SODIUM CHLORIDE 25 ML/HR: 9 INJECTION, SOLUTION INTRAVENOUS at 09:45

## 2022-10-13 RX ADMIN — PACLITAXEL 120 MG: 6 INJECTION, SOLUTION, CONCENTRATE INTRAVENOUS at 13:28

## 2022-10-13 RX ADMIN — DEXAMETHASONE SODIUM PHOSPHATE 10 MG: 10 INJECTION INTRAMUSCULAR; INTRAVENOUS at 12:44

## 2022-10-13 RX ADMIN — FAMOTIDINE 20 MG: 10 INJECTION, SOLUTION INTRAVENOUS at 12:42

## 2022-10-13 RX ADMIN — SODIUM CHLORIDE, PRESERVATIVE FREE 10 ML: 5 INJECTION INTRAVENOUS at 14:55

## 2022-10-13 ASSESSMENT — PATIENT HEALTH QUESTIONNAIRE - PHQ9
SUM OF ALL RESPONSES TO PHQ QUESTIONS 1-9: 1
SUM OF ALL RESPONSES TO PHQ QUESTIONS 1-9: 1
2. FEELING DOWN, DEPRESSED OR HOPELESS: 1
SUM OF ALL RESPONSES TO PHQ QUESTIONS 1-9: 1
SUM OF ALL RESPONSES TO PHQ9 QUESTIONS 1 & 2: 1
SUM OF ALL RESPONSES TO PHQ QUESTIONS 1-9: 1
1. LITTLE INTEREST OR PLEASURE IN DOING THINGS: 0

## 2022-10-13 NOTE — PROGRESS NOTES
10/13/22 saw pt today with Dr. Tulio Stack for pre chemo cycle 1 day 1 taxol/cyramza. Discussed hydration and nutrition. Proceed to infusion. Follow up in 1 week. Encouraged to call with any concerns. Navigation will continue to follow.

## 2022-10-13 NOTE — PATIENT INSTRUCTIONS
Patient Instructions from Today's Visit    Reason for Visit:  Pre chemo cycle 1 day 1 taxol/cyramza     Plan:  Proceed to infusion     Follow Up:  1 week    Recent Lab Results:  Hospital Outpatient Visit on 10/13/2022   Component Date Value Ref Range Status    WBC 10/13/2022 9.2  4.3 - 11.1 K/uL Final    RBC 10/13/2022 3.00 (A)  4.05 - 5.2 M/uL Final    Hemoglobin 10/13/2022 8.2 (A)  11.7 - 15.4 g/dL Final    Hematocrit 10/13/2022 26.3 (A)  35.8 - 46.3 % Final    MCV 10/13/2022 87.7  82.0 - 102.0 FL Final    MCH 10/13/2022 27.3  26.1 - 32.9 PG Final    MCHC 10/13/2022 31.2 (A)  31.4 - 35.0 g/dL Final    RDW 10/13/2022 16.8 (A)  11.9 - 14.6 % Final    Platelets 83/61/0801 312  150 - 450 K/uL Final    MPV 10/13/2022 9.4  9.4 - 12.3 FL Final    nRBC 10/13/2022 0.00  0.0 - 0.2 K/uL Final    **Note: Absolute NRBC parameter is now reported with Hemogram**    Seg Neutrophils 10/13/2022 80 (A)  43 - 78 % Final    Lymphocytes 10/13/2022 11 (A)  13 - 44 % Final    Monocytes 10/13/2022 8  4.0 - 12.0 % Final    Eosinophils % 10/13/2022 0 (A)  0.5 - 7.8 % Final    Basophils 10/13/2022 0  0.0 - 2.0 % Final    Immature Granulocytes 10/13/2022 1  0.0 - 5.0 % Final    Segs Absolute 10/13/2022 7.4  1.7 - 8.2 K/UL Final    Absolute Lymph # 10/13/2022 1.0  0.5 - 4.6 K/UL Final    Absolute Mono # 10/13/2022 0.8  0.1 - 1.3 K/UL Final    Absolute Eos # 10/13/2022 0.0  0.0 - 0.8 K/UL Final    Basophils Absolute 10/13/2022 0.0  0.0 - 0.2 K/UL Final    Absolute Immature Granulocyte 10/13/2022 0.1  0.0 - 0.5 K/UL Final    Differential Type 10/13/2022 AUTOMATED    Final   Hospital Outpatient Visit on 10/11/2022   Component Date Value Ref Range Status    WBC 10/11/2022 10.0  4.3 - 11.1 K/uL Final    RBC 10/11/2022 3.04 (A)  4.05 - 5.2 M/uL Final    Hemoglobin 10/11/2022 8.3 (A)  11.7 - 15.4 g/dL Final    Hematocrit 10/11/2022 26.3 (A)  35.8 - 46.3 % Final    MCV 10/11/2022 86.5  82.0 - 102.0 FL Final    MCH 10/11/2022 27.3  26.1 - 32.9 PG Final    MCHC 10/11/2022 31.6  31.4 - 35.0 g/dL Final    RDW 10/11/2022 16.8 (A)  11.9 - 14.6 % Final    Platelets 61/28/1367 315  150 - 450 K/uL Final    MPV 10/11/2022 9.8  9.4 - 12.3 FL Final    nRBC 10/11/2022 0.00  0.0 - 0.2 K/uL Final    **Note: Absolute NRBC parameter is now reported with Hemogram**    Seg Neutrophils 10/11/2022 76  43 - 78 % Final    Lymphocytes 10/11/2022 14  13 - 44 % Final    Monocytes 10/11/2022 9  4.0 - 12.0 % Final    Eosinophils % 10/11/2022 0 (A)  0.5 - 7.8 % Final    Basophils 10/11/2022 0  0.0 - 2.0 % Final    Immature Granulocytes 10/11/2022 1  0.0 - 5.0 % Final    Segs Absolute 10/11/2022 7.6  1.7 - 8.2 K/UL Final    Absolute Lymph # 10/11/2022 1.4  0.5 - 4.6 K/UL Final    Absolute Mono # 10/11/2022 0.9  0.1 - 1.3 K/UL Final    Absolute Eos # 10/11/2022 0.0  0.0 - 0.8 K/UL Final    Basophils Absolute 10/11/2022 0.0  0.0 - 0.2 K/UL Final    Absolute Immature Granulocyte 10/11/2022 0.1  0.0 - 0.5 K/UL Final    Differential Type 10/11/2022 AUTOMATED    Final    Sodium 10/11/2022 134  133 - 143 mmol/L Final    Potassium 10/11/2022 2.7 (A)  3.5 - 5.1 mmol/L Final    Chloride 10/11/2022 94 (A)  101 - 110 mmol/L Final    CO2 10/11/2022 31  21 - 32 mmol/L Final    Anion Gap 10/11/2022 9  2 - 11 mmol/L Final    Glucose 10/11/2022 96  65 - 100 mg/dL Final    BUN 10/11/2022 3 (A)  6 - 23 MG/DL Final    Creatinine 10/11/2022 0.40 (A)  0.6 - 1.0 MG/DL Final    Est, Conrad Filt Rate 10/11/2022 >60  >60 ml/min/1.73m2 Final    Comment:      Pediatric calculator link: [email or web address]/[email or web address]_[email or web address]       Effective Oct 3, 2022       These results are not intended for use in patients <25years of age. eGFR results are calculated without a race factor using  the 2021 CKD-EPI equation. Careful clinical correlation is recommended, particularly when comparing to results calculated using previous equations.   The CKD-EPI equation is less accurate in patients with extremes of muscle mass, extra-renal metabolism of creatinine, excessive creatine ingestion, or following therapy that affects renal tubular secretion. Calcium 10/11/2022 7.9 (A)  8.3 - 10.4 MG/DL Final    Total Bilirubin 10/11/2022 0.4  0.2 - 1.1 MG/DL Final    ALT 10/11/2022 11 (A)  12 - 65 U/L Final    AST 10/11/2022 10 (A)  15 - 37 U/L Final    Alk Phosphatase 10/11/2022 90  50 - 136 U/L Final    Total Protein 10/11/2022 6.2 (A)  6.3 - 8.2 g/dL Final    Albumin 10/11/2022 2.8 (A)  3.5 - 5.0 g/dL Final    Globulin 10/11/2022 3.4  2.8 - 4.5 g/dL Final    Albumin/Globulin Ratio 10/11/2022 0.8  0.4 - 1.6   Final         Treatment Summary has been discussed and given to patient: no        -------------------------------------------------------------------------------------------------------------------  Please call our office at [phone] if you have any  of the following symptoms:   Fever of 100.5 or greater  Chills  Shortness of breath  Swelling or pain in one leg    After office hours an answering service is available and will contact a provider for emergencies or if you are experiencing any of the above symptoms. Patient did express an interest in My Chart. My Chart log in information explained on the after visit summary printout at the [facility]  desk.     Dony Conrad RN, BSN  Nurse Navigator  [phone] cell  [email or web address]

## 2022-10-13 NOTE — PROGRESS NOTES
Arrived to the [facility]. D1C1 Taxol/Cyramza completed. Patient tolerated well. Any issues or concerns during appointment:  Patient with nausea/vomiting prior to completion of Cyramza. IV   Zofran administered. Patient aware of next infusion appointment on 10/20 (date) at 1:30 PM (time). Patient instructed to call provider with temperature of 100.4 or greater or nausea/vomiting/ diarrhea or pain not controlled by medications  Discharged via w/c.

## 2022-10-13 NOTE — PROGRESS NOTES
Patient arrived to port lab for port access and lab draw   Helen Rhoades 45 accessed and labs drawn per protocol   *Port remains accessed   Patient discharged from port lab ambulatory*

## 2022-10-13 NOTE — PROGRESS NOTES
HEMATOLOGY/ONCOLOGY FOLLOWUP  VISIT      Patient Name: Delilah Juarez             Date of Visit: 10/13/2022  : 1975  Age:47 y.o. Presenting Complaint:  Delilah Juarez  is seen in follow-up for carcinoma of unknown primary. History of Present Illness:  Ms. Mariano Dudley was seen for the first time in our office in 2022. She was a woman of previously good health who began noticing left-sided back discomfort in early 2022. This was associated  ultimately with a sense of difficulty swallowing and ultimately she presented to [facility] for evaluation. Her symptoms were felt to potentially be indicative of a bowel obstruction and she was sent for a CT scan. This study, performed on 2022  was notable for a linear calcific density along the course of the proximal left ureter with associated moderate hydronephrosis potentially indicative of an intraureteral calculus. There was also stranding throughout the retroperitoneal soft tissues anterior  to the left psoas muscle with pelvic ascites. There was also wall thickening involving the descending colon. The question of colitis or serosal implants was raised. The patient had undergone a gastric sleeve placement several years prior. She had  also undergone a negative colonoscopy about 3 years prior to these presentations. There was a history of anemia ultimately resulting in the performance of a hysterectomy approximately 3 years ago for menorrhagia. Because of the CT scan findings, she  was ultimately referred to Dr. Katelyn Gant for evaluation of a possible pelvic malignancy. On 2022 he performed a laparoscopy and biopsy with evidence of peritoneal carcinomatosis grossly. A \"peritoneal nodule\" and a \"peritoneal implant\"  were both positive for a poorly differentiated metastatic carcinoma potentially consistent with an upper gastrointestinal primary.   An omental biopsy showed no evidence of malignancy. Immunohistochemistries were positive for cytokeratin 7 and negative  for cytokeratin 20. The tumor was weakly positive for CDX2. The only other positive study was MOC-31. Testing through [facility] [facility] demonstrated no mutation and ERB-B2. There was no microsatellite instability and no mismatch repair protein deficiencies. There were no targetable lesions. A PET scan was subsequently performed on  showing elevated FDG uptake in the left pelvis corresponding with a masslike density concerning  for peritoneal carcinomatosis. There was a small volume of free fluid within the peritoneal cavity. There was moderate right hydroureteronephrosis. Despite the pathologic findings, there was no evidence of FDG activity in the upper gastrointestinal  tract. CA-125 was slightly elevated at 44. Given the uncertainty of her diagnosis, the patient went back for additional surgery on February 15 at which time she underwent bilateral ureteral stent placement and bilateral salpingo-oophorectomies. Pathology  from that surgery was notable for poorly differentiated carcinoma with occasional signet ring features. Immunohistochemical stains were most consistent with a tumor of the upper gastrointestinal tract. She is  1 para 1 abortus 0. There is no  family history of cancer. She has had no difficulties with vomiting. She still notes that some food traverses her esophagus slowly. She had undergone a prior dilatation without any evidence of cancer. She subsequently began treatment with FOLFOX ultimately  with the addition of bevacizumab in 2022. She ultimately received 8 cycles of FOLFOX most of them with Avastin. On , she was taken to surgery for evaluation of debulking and HIPEC. Unfortunately, at the time of surgery her tumor was found to be tightly adherent and impossible to debulk.   Despite the FOLFOX, she developed progressive ascites necessitating placement of a Pleurx catheter. Based on this symptomatic progression, a decision was made to place her on paclitaxel and ramucirumab. She returns for follow-up. Prior to initiating the paclitaxel and ramucirumab, she was hospitalized. She had fever and a presumptive colitis based on CT scanning. This was treated with antibiotics and her chemotherapy ultimately was delayed until today. She no longer has fever. Her ascites cultures were negative at the time of hospitalization. She has no headache and no focal neurologic complaints. She continues to have intermittent vomiting and has used our infusion suite for volume resuscitation on occasion. She is complaining of some mild neuropathy but no associated dysesthesias. She has chronic belly swelling. During her hospitalization, she underwent bilateral ureteral stent removal with replacement on the right. Medications:   Current Outpatient Medications   Medication Sig Dispense Refill    ciprofloxacin (CIPRO) 500 MG tablet Take 1 tablet by mouth 2 times daily for 11 days 22 tablet 0    metoclopramide (REGLAN) 5 MG tablet Take 1 tablet by mouth daily 120 tablet 3    sennosides-docusate sodium (SENOKOT-S) 8.6-50 MG tablet Take 2 tablets by mouth daily as needed for Constipation 90 tablet 0    polyethylene glycol (GLYCOLAX) 17 g packet Take 17 g by mouth daily as needed for Constipation 527 g 1    sucralfate (CARAFATE) 1 GM tablet Take 1 tablet by mouth 4 times daily 120 tablet 3    docusate sodium (COLACE, DULCOLAX) 100 MG CAPS Take 100 mg by mouth 2 times daily      apixaban (ELIQUIS) 5 MG TABS tablet Take 1 tablet by mouth in the morning and 1 tablet before bedtime.  60 tablet 0    potassium chloride (KLOR-CON M) 20 MEQ extended release tablet Take 1 tablet by mouth 2 times daily 60 tablet 1    lidocaine-prilocaine (EMLA) 2.5-2.5 % cream Apply to port about 45 minutes prior to access      ondansetron (ZOFRAN) 8 MG tablet Take 8 mg by mouth every 8 hours as needed gabapentin (NEURONTIN) 300 MG capsule Take 1 capsule by mouth 3 times daily for 15 days. (Patient not taking: Reported on 10/13/2022) 45 capsule 0    metroNIDAZOLE (FLAGYL) 500 MG tablet Take 1 tablet by mouth 3 times daily for 11 days (Patient not taking: Reported on 10/13/2022) 33 tablet 0    oxyCODONE (ROXICODONE) 5 MG immediate release tablet Take 1 tablet by mouth every 6 hours as needed for Pain for up to 30 days. Intended supply: 3 days. Take lowest dose possible to manage pain (Patient not taking: Reported on 10/13/2022) 90 tablet 0    pantoprazole (PROTONIX) 40 MG tablet Take 1 tablet by mouth in the morning and at bedtime 60 tablet 0     No current facility-administered medications for this visit. Facility-Administered Medications Ordered in Other Visits   Medication Dose Route Frequency Provider Last Rate Last Admin    sodium chloride flush 0.9 % injection 10 mL  10 mL IntraVENous PRN Malena Sosa MD   10 mL at 10/13/22 0815       Allergies:  No Known Allergies    Review of Systems: The Review of Systems is documented in full in the internal medical record. All systems are negative other than for those noted above. Past Medical History:  Documented in electronic medical record    Past Surgical History:  Documented in electronic medical record    Social History:  Documented in electronic medical record    Family History:  Documented in electronic medical record      Physical Examination:  General Appearance: Chronically ill appearing patient in no acute distress. Vital signs: /85 (Site: Right Upper Arm, Position: Standing, Cuff Size: Small Adult)   Pulse (!) 109   Temp 98.1 °F (36.7 °C) (Oral)   Resp 16   Ht 5' 4\" (1.626 m)   Wt 105 lb 8 oz (47.9 kg)   SpO2 98%   BMI 18.11 kg/m²     Performance status: ECOG Level:3  Distress  Screening Score: PHQ-9 Total Score: 1 (10/13/2022  8:28 AM)  Pain Score:   0 - No pain (fatigue-0)    HEENT: No oral exam.  Neck: Supple.  There is no thyromegaly. Lymph nodes: There is no cervical, supraclavicular, axillary or inguinal adenopathy. Breasts: Not examined. Lungs: The lungs are clear to auscultation and percussion. There is no egophony. There is no chest wall tenderness and no  use of accessory respiratory musculature. Heart: There is no jugular venous distention. The rate is normal and rhythm regular. The S1 and S2 are normal and there are no murmurs or rubs. Abdomen: Soft, non-tender, distended, bowel sounds present and normal, no appreciated hepatosplenomegaly. No palpable masses. Abdominal Pleurx catheter in place. Skin: No rash, petechiae or ecchymoses. No evidence of malignancy. Extremities: No cyanosis, clubbing; trace lower extremity edema. Labs/Imaging:  Lab Results   Component Value Date/Time    WBC 9.2 10/13/2022 08:08 AM    HGB 8.2 10/13/2022 08:08 AM    HCT 26.3 10/13/2022 08:08 AM     10/13/2022 08:08 AM    MCV 87.7 10/13/2022 08:08 AM       Lab Results   Component Value Date/Time     10/13/2022 08:08 AM    K 3.6 10/13/2022 08:08 AM    CL 99 10/13/2022 08:08 AM    CO2 28 10/13/2022 08:08 AM    BUN 4 10/13/2022 08:08 AM    GFRAA >60 09/27/2022 02:37 AM    GLOB 3.5 10/13/2022 08:08 AM    ALT 12 10/13/2022 08:08 AM       Above results reviewed with patient. ASSESSMENT:   Ms. Veronique Watkins has an apparent metastatic carcinoma of unknown primary. Pathologically, the tumor seems to resemble a process arising in the upper gastrointestinal tract. However, any such primary is  not visible on EGD or PET scanning. Dr. Hieu Samayoa presumption is that this may have originated from the colon based on its location and behavior. That too would be somewhat inconsistent with the pathology findings. She has been treated with FOLFOX and Avastin. There was certainly no symptomatic response and in fact she developed progressive and symptomatic ascites suggesting disease progression.   As a result, a decision was made to proceed with paclitaxel and ramucirumab. This has not yet begun because of her recent hospitalization. PLAN:  We will initiate paclitaxel and Cyramza today. We reviewed some of the potential side effects of the drug including potential exacerbation of her underlying neuropathy. She is aware that cure is not possible with this combination of medications or any other for that matter. Our goal is palliation and improvement of her symptoms. She will see a nurse practitioner next week to assess tolerance. RESUSCITATION DIRECTIVES/HOSPICE CARE;  Full Support    ADDENDUM: I have opted to treat her today with ramucirumab despite a urinary protein/creatinine ratio of 1.3. The protein in her urine may be reflective of inflammation from the stent and not necessarily reflective of renal dysfunction.       Elvia Fuentes MD [address] [place] [address]    Oncology and Hematology   [address]  P [phone]  F [phone]  [email or web address]

## 2022-10-14 ENCOUNTER — HOSPITAL ENCOUNTER (OUTPATIENT)
Dept: INFUSION THERAPY | Age: 47
Discharge: HOME | End: 2022-10-14

## 2022-10-14 ENCOUNTER — CLINICAL DOCUMENTATION (OUTPATIENT)
Dept: CASE MANAGEMENT | Age: 47
End: 2022-10-14

## 2022-10-14 PROBLEM — T45.1X5A CINV (CHEMOTHERAPY-INDUCED NAUSEA AND VOMITING): Status: ACTIVE | Noted: 2022-10-14

## 2022-10-14 PROBLEM — R11.2 CINV (CHEMOTHERAPY-INDUCED NAUSEA AND VOMITING): Status: ACTIVE | Noted: 2022-10-14

## 2022-10-14 LAB — HBV CORE AB SERPL QL IA: NEGATIVE

## 2022-10-14 RX ORDER — SODIUM CHLORIDE 9 MG/ML
INJECTION, SOLUTION INTRAVENOUS ONCE
Status: CANCELLED
Start: 2022-10-14 | End: 2022-10-14

## 2022-10-14 NOTE — PROGRESS NOTES
Spoke to pt and she stated she was vomiting at least one time daily and not able to drink much fluid.  We will bring her to office for IV nausea meds and fluids

## 2022-10-15 ENCOUNTER — HOSPITAL ENCOUNTER (OUTPATIENT)
Dept: INFUSION THERAPY | Age: 47
Discharge: HOME | End: 2022-10-15
Payer: COMMERCIAL

## 2022-10-15 VITALS
SYSTOLIC BLOOD PRESSURE: 125 MMHG | OXYGEN SATURATION: 98 % | HEART RATE: 94 BPM | RESPIRATION RATE: 16 BRPM | DIASTOLIC BLOOD PRESSURE: 88 MMHG | TEMPERATURE: 98.6 F

## 2022-10-15 DIAGNOSIS — C80.1 METASTASIS TO PERITONEUM OF UNKNOWN PRIMARY (HCC): ICD-10-CM

## 2022-10-15 DIAGNOSIS — C78.6 METASTASIS TO PERITONEUM OF UNKNOWN PRIMARY (HCC): ICD-10-CM

## 2022-10-15 DIAGNOSIS — T45.1X5A CINV (CHEMOTHERAPY-INDUCED NAUSEA AND VOMITING): Primary | ICD-10-CM

## 2022-10-15 DIAGNOSIS — R11.2 CINV (CHEMOTHERAPY-INDUCED NAUSEA AND VOMITING): Primary | ICD-10-CM

## 2022-10-15 PROCEDURE — 6360000002 HC RX W HCPCS: Performed by: INTERNAL MEDICINE

## 2022-10-15 PROCEDURE — 2580000003 HC RX 258: Performed by: INTERNAL MEDICINE

## 2022-10-15 PROCEDURE — 96361 HYDRATE IV INFUSION ADD-ON: CPT

## 2022-10-15 PROCEDURE — 96365 THER/PROPH/DIAG IV INF INIT: CPT

## 2022-10-15 RX ORDER — SODIUM CHLORIDE 9 MG/ML
INJECTION, SOLUTION INTRAVENOUS ONCE
Status: COMPLETED | OUTPATIENT
Start: 2022-10-15 | End: 2022-10-15

## 2022-10-15 RX ORDER — SODIUM CHLORIDE 0.9 % (FLUSH) 0.9 %
5-40 SYRINGE (ML) INJECTION PRN
Status: DISCONTINUED | OUTPATIENT
Start: 2022-10-15 | End: 2022-10-16 | Stop reason: HOSPADM

## 2022-10-15 RX ORDER — SODIUM CHLORIDE 9 MG/ML
INJECTION, SOLUTION INTRAVENOUS ONCE
Status: CANCELLED
Start: 2022-10-15 | End: 2022-10-15

## 2022-10-15 RX ADMIN — SODIUM CHLORIDE, PRESERVATIVE FREE 10 ML: 5 INJECTION INTRAVENOUS at 12:15

## 2022-10-15 RX ADMIN — FOSAPREPITANT 150 MG: 150 INJECTION, POWDER, LYOPHILIZED, FOR SOLUTION INTRAVENOUS at 12:26

## 2022-10-15 RX ADMIN — SODIUM CHLORIDE, PRESERVATIVE FREE 10 ML: 5 INJECTION INTRAVENOUS at 13:41

## 2022-10-15 RX ADMIN — SODIUM CHLORIDE: 9 INJECTION, SOLUTION INTRAVENOUS at 12:15

## 2022-10-15 NOTE — PROGRESS NOTES
Patient arrived to the infusion center without an appointment scheduled. Patient reports that she called yesterday because she was having nausea and she was instructed to come for IVF however she was unable to come yesterday. Hydration and Emend completed. Patient tolerated well. Any issues or concerns during appointment: none. Patient aware of next infusion appointment on 10/20 (date) at 1:30 PM (time). Patient instructed to call provider with temperature of 100.4 or greater or nausea/vomiting/ diarrhea or pain not controlled by medications. Patient also instructed to call prior to arrival to make an appointment in the future. Discharged via w/c.

## 2022-10-20 ENCOUNTER — HOSPITAL ENCOUNTER (OUTPATIENT)
Dept: INFUSION THERAPY | Age: 47
Discharge: HOME | End: 2022-10-20
Payer: COMMERCIAL

## 2022-10-20 ENCOUNTER — OFFICE VISIT (OUTPATIENT)
Dept: ONCOLOGY | Age: 47
End: 2022-10-20
Payer: COMMERCIAL

## 2022-10-20 ENCOUNTER — HOSPITAL ENCOUNTER (INPATIENT)
Age: 47
LOS: 6 days | Discharge: HOME HEALTH | DRG: 380 | End: 2022-10-26
Attending: STUDENT IN AN ORGANIZED HEALTH CARE EDUCATION/TRAINING PROGRAM | Admitting: INTERNAL MEDICINE
Payer: COMMERCIAL

## 2022-10-20 ENCOUNTER — APPOINTMENT (OUTPATIENT)
Dept: CT IMAGING | Age: 47
DRG: 380 | End: 2022-10-20
Payer: COMMERCIAL

## 2022-10-20 ENCOUNTER — OFFICE VISIT (OUTPATIENT)
Dept: ONCOLOGY | Age: 47
End: 2022-10-20

## 2022-10-20 ENCOUNTER — APPOINTMENT (OUTPATIENT)
Dept: GENERAL RADIOLOGY | Age: 47
DRG: 380 | End: 2022-10-20
Payer: COMMERCIAL

## 2022-10-20 VITALS
TEMPERATURE: 97.7 F | OXYGEN SATURATION: 94 % | BODY MASS INDEX: 15.73 KG/M2 | DIASTOLIC BLOOD PRESSURE: 87 MMHG | HEIGHT: 64 IN | RESPIRATION RATE: 17 BRPM | HEART RATE: 132 BPM | SYSTOLIC BLOOD PRESSURE: 107 MMHG | WEIGHT: 92.1 LBS

## 2022-10-20 VITALS — HEART RATE: 105 BPM

## 2022-10-20 DIAGNOSIS — C78.6 METASTASIS TO PERITONEUM OF UNKNOWN PRIMARY (HCC): ICD-10-CM

## 2022-10-20 DIAGNOSIS — C80.1 METASTASIS TO PERITONEUM OF UNKNOWN PRIMARY (HCC): Primary | ICD-10-CM

## 2022-10-20 DIAGNOSIS — C79.9 METASTATIC CARCINOMA (HCC): ICD-10-CM

## 2022-10-20 DIAGNOSIS — R13.10 DYSPHAGIA, UNSPECIFIED TYPE: Primary | ICD-10-CM

## 2022-10-20 DIAGNOSIS — C16.9 MALIGNANT NEOPLASM OF STOMACH, UNSPECIFIED LOCATION (HCC): ICD-10-CM

## 2022-10-20 DIAGNOSIS — C80.1 CARCINOMA OF UNKNOWN PRIMARY (HCC): ICD-10-CM

## 2022-10-20 DIAGNOSIS — C78.6 PERITONEAL CARCINOMATOSIS (HCC): ICD-10-CM

## 2022-10-20 DIAGNOSIS — G89.29 CHRONIC ABDOMINAL PAIN: ICD-10-CM

## 2022-10-20 DIAGNOSIS — R47.02 DYSPHASIA: Primary | ICD-10-CM

## 2022-10-20 DIAGNOSIS — C80.1 METASTASIS TO PERITONEUM OF UNKNOWN PRIMARY (HCC): ICD-10-CM

## 2022-10-20 DIAGNOSIS — Z00.8 NUTRITIONAL ASSESSMENT: Primary | ICD-10-CM

## 2022-10-20 DIAGNOSIS — R13.19 ESOPHAGEAL DYSPHAGIA: ICD-10-CM

## 2022-10-20 DIAGNOSIS — C78.6 METASTASIS TO PERITONEUM OF UNKNOWN PRIMARY (HCC): Primary | ICD-10-CM

## 2022-10-20 DIAGNOSIS — R10.9 CHRONIC ABDOMINAL PAIN: ICD-10-CM

## 2022-10-20 PROBLEM — E44.0 MODERATE PROTEIN-CALORIE MALNUTRITION (HCC): Chronic | Status: ACTIVE | Noted: 2022-10-20

## 2022-10-20 PROBLEM — R11.2 INTRACTABLE NAUSEA AND VOMITING: Status: ACTIVE | Noted: 2022-10-20

## 2022-10-20 LAB
ALBUMIN SERPL-MCNC: 2.9 G/DL (ref 3.5–5)
ALBUMIN SERPL-MCNC: 3 G/DL (ref 3.5–5)
ALBUMIN/GLOB SERPL: 0.9 {RATIO} (ref 0.4–1.6)
ALBUMIN/GLOB SERPL: 0.9 {RATIO} (ref 0.4–1.6)
ALP SERPL-CCNC: 81 U/L (ref 50–136)
ALP SERPL-CCNC: 90 U/L (ref 50–136)
ALT SERPL-CCNC: 13 U/L (ref 12–65)
ALT SERPL-CCNC: 13 U/L (ref 12–65)
ANION GAP SERPL CALC-SCNC: 10 MMOL/L (ref 2–11)
ANION GAP SERPL CALC-SCNC: 10 MMOL/L (ref 2–11)
AST SERPL-CCNC: 13 U/L (ref 15–37)
AST SERPL-CCNC: 15 U/L (ref 15–37)
BASOPHILS # BLD: 0 K/UL (ref 0–0.2)
BASOPHILS # BLD: 0 K/UL (ref 0–0.2)
BASOPHILS NFR BLD: 0 % (ref 0–2)
BASOPHILS NFR BLD: 0 % (ref 0–2)
BILIRUB SERPL-MCNC: 0.4 MG/DL (ref 0.2–1.1)
BILIRUB SERPL-MCNC: 0.4 MG/DL (ref 0.2–1.1)
BUN SERPL-MCNC: 10 MG/DL (ref 6–23)
BUN SERPL-MCNC: 11 MG/DL (ref 6–23)
CALCIUM SERPL-MCNC: 8.2 MG/DL (ref 8.3–10.4)
CALCIUM SERPL-MCNC: 8.5 MG/DL (ref 8.3–10.4)
CHLORIDE SERPL-SCNC: 100 MMOL/L (ref 101–110)
CHLORIDE SERPL-SCNC: 97 MMOL/L (ref 101–110)
CO2 SERPL-SCNC: 25 MMOL/L (ref 21–32)
CO2 SERPL-SCNC: 27 MMOL/L (ref 21–32)
CREAT SERPL-MCNC: 0.5 MG/DL (ref 0.6–1)
CREAT SERPL-MCNC: 0.5 MG/DL (ref 0.6–1)
DIFFERENTIAL METHOD BLD: ABNORMAL
DIFFERENTIAL METHOD BLD: ABNORMAL
EOSINOPHIL # BLD: 0 K/UL (ref 0–0.8)
EOSINOPHIL # BLD: 0 K/UL (ref 0–0.8)
EOSINOPHIL NFR BLD: 0 % (ref 0.5–7.8)
EOSINOPHIL NFR BLD: 1 % (ref 0.5–7.8)
ERYTHROCYTE [DISTWIDTH] IN BLOOD BY AUTOMATED COUNT: 17.1 % (ref 11.9–14.6)
ERYTHROCYTE [DISTWIDTH] IN BLOOD BY AUTOMATED COUNT: 17.1 % (ref 11.9–14.6)
GLOBULIN SER CALC-MCNC: 3.4 G/DL (ref 2.8–4.5)
GLOBULIN SER CALC-MCNC: 3.5 G/DL (ref 2.8–4.5)
GLUCOSE BLD STRIP.AUTO-MCNC: 149 MG/DL (ref 65–100)
GLUCOSE SERPL-MCNC: 159 MG/DL (ref 65–100)
GLUCOSE SERPL-MCNC: 187 MG/DL (ref 65–100)
HCT VFR BLD AUTO: 29.6 % (ref 35.8–46.3)
HCT VFR BLD AUTO: 32.6 % (ref 35.8–46.3)
HGB BLD-MCNC: 10.1 G/DL (ref 11.7–15.4)
HGB BLD-MCNC: 9.2 G/DL (ref 11.7–15.4)
IMM GRANULOCYTES # BLD AUTO: 0 K/UL (ref 0–0.5)
IMM GRANULOCYTES # BLD AUTO: 0 K/UL (ref 0–0.5)
IMM GRANULOCYTES NFR BLD AUTO: 1 % (ref 0–5)
IMM GRANULOCYTES NFR BLD AUTO: 1 % (ref 0–5)
LIPASE SERPL-CCNC: 37 U/L (ref 73–393)
LYMPHOCYTES # BLD: 0.7 K/UL (ref 0.5–4.6)
LYMPHOCYTES # BLD: 1.9 K/UL (ref 0.5–4.6)
LYMPHOCYTES NFR BLD: 16 % (ref 13–44)
LYMPHOCYTES NFR BLD: 33 % (ref 13–44)
MAGNESIUM SERPL-MCNC: 1.5 MG/DL (ref 1.8–2.4)
MCH RBC QN AUTO: 27.2 PG (ref 26.1–32.9)
MCH RBC QN AUTO: 27.5 PG (ref 26.1–32.9)
MCHC RBC AUTO-ENTMCNC: 31 G/DL (ref 31.4–35)
MCHC RBC AUTO-ENTMCNC: 31.1 G/DL (ref 31.4–35)
MCV RBC AUTO: 87.6 FL (ref 82–102)
MCV RBC AUTO: 88.4 FL (ref 82–102)
MONOCYTES # BLD: 0.2 K/UL (ref 0.1–1.3)
MONOCYTES # BLD: 0.4 K/UL (ref 0.1–1.3)
MONOCYTES NFR BLD: 3 % (ref 4–12)
MONOCYTES NFR BLD: 6 % (ref 4–12)
NEUTS SEG # BLD: 3.4 K/UL (ref 1.7–8.2)
NEUTS SEG # BLD: 3.8 K/UL (ref 1.7–8.2)
NEUTS SEG NFR BLD: 60 % (ref 43–78)
NEUTS SEG NFR BLD: 80 % (ref 43–78)
NRBC # BLD: 0.02 K/UL (ref 0–0.2)
NRBC # BLD: 0.04 K/UL (ref 0–0.2)
PLATELET # BLD AUTO: 343 K/UL (ref 150–450)
PLATELET # BLD AUTO: 351 K/UL (ref 150–450)
PMV BLD AUTO: 10.4 FL (ref 9.4–12.3)
PMV BLD AUTO: 9.6 FL (ref 9.4–12.3)
POTASSIUM SERPL-SCNC: 3.6 MMOL/L (ref 3.5–5.1)
POTASSIUM SERPL-SCNC: 3.8 MMOL/L (ref 3.5–5.1)
PROT SERPL-MCNC: 6.3 G/DL (ref 6.3–8.2)
PROT SERPL-MCNC: 6.5 G/DL (ref 6.3–8.2)
RBC # BLD AUTO: 3.35 M/UL (ref 4.05–5.2)
RBC # BLD AUTO: 3.72 M/UL (ref 4.05–5.2)
SERVICE CMNT-IMP: ABNORMAL
SODIUM SERPL-SCNC: 134 MMOL/L (ref 133–143)
SODIUM SERPL-SCNC: 135 MMOL/L (ref 133–143)
TROPONIN I SERPL HS-MCNC: 11.5 PG/ML (ref 0–14)
WBC # BLD AUTO: 4.7 K/UL (ref 4.3–11.1)
WBC # BLD AUTO: 5.7 K/UL (ref 4.3–11.1)

## 2022-10-20 PROCEDURE — 2580000003 HC RX 258: Performed by: FAMILY MEDICINE

## 2022-10-20 PROCEDURE — 83690 ASSAY OF LIPASE: CPT

## 2022-10-20 PROCEDURE — 2580000003 HC RX 258: Performed by: NURSE PRACTITIONER

## 2022-10-20 PROCEDURE — 6360000002 HC RX W HCPCS: Performed by: FAMILY MEDICINE

## 2022-10-20 PROCEDURE — 99285 EMERGENCY DEPT VISIT HI MDM: CPT

## 2022-10-20 PROCEDURE — 96413 CHEMO IV INFUSION 1 HR: CPT

## 2022-10-20 PROCEDURE — 82962 GLUCOSE BLOOD TEST: CPT

## 2022-10-20 PROCEDURE — 6360000002 HC RX W HCPCS: Performed by: STUDENT IN AN ORGANIZED HEALTH CARE EDUCATION/TRAINING PROGRAM

## 2022-10-20 PROCEDURE — C9113 INJ PANTOPRAZOLE SODIUM, VIA: HCPCS | Performed by: FAMILY MEDICINE

## 2022-10-20 PROCEDURE — 2500000003 HC RX 250 WO HCPCS: Performed by: NURSE PRACTITIONER

## 2022-10-20 PROCEDURE — 36591 DRAW BLOOD OFF VENOUS DEVICE: CPT

## 2022-10-20 PROCEDURE — 96374 THER/PROPH/DIAG INJ IV PUSH: CPT

## 2022-10-20 PROCEDURE — 99214 OFFICE O/P EST MOD 30 MIN: CPT | Performed by: NURSE PRACTITIONER

## 2022-10-20 PROCEDURE — A4216 STERILE WATER/SALINE, 10 ML: HCPCS | Performed by: NURSE PRACTITIONER

## 2022-10-20 PROCEDURE — 71045 X-RAY EXAM CHEST 1 VIEW: CPT

## 2022-10-20 PROCEDURE — 6360000002 HC RX W HCPCS: Performed by: NURSE PRACTITIONER

## 2022-10-20 PROCEDURE — 80053 COMPREHEN METABOLIC PANEL: CPT

## 2022-10-20 PROCEDURE — 2580000003 HC RX 258: Performed by: INTERNAL MEDICINE

## 2022-10-20 PROCEDURE — 1100000003 HC PRIVATE W/ TELEMETRY

## 2022-10-20 PROCEDURE — 2580000003 HC RX 258: Performed by: STUDENT IN AN ORGANIZED HEALTH CARE EDUCATION/TRAINING PROGRAM

## 2022-10-20 PROCEDURE — 74177 CT ABD & PELVIS W/CONTRAST: CPT

## 2022-10-20 PROCEDURE — A4216 STERILE WATER/SALINE, 10 ML: HCPCS | Performed by: STUDENT IN AN ORGANIZED HEALTH CARE EDUCATION/TRAINING PROGRAM

## 2022-10-20 PROCEDURE — 85025 COMPLETE CBC W/AUTO DIFF WBC: CPT

## 2022-10-20 PROCEDURE — C9113 INJ PANTOPRAZOLE SODIUM, VIA: HCPCS | Performed by: STUDENT IN AN ORGANIZED HEALTH CARE EDUCATION/TRAINING PROGRAM

## 2022-10-20 PROCEDURE — 83735 ASSAY OF MAGNESIUM: CPT

## 2022-10-20 PROCEDURE — 84484 ASSAY OF TROPONIN QUANT: CPT

## 2022-10-20 PROCEDURE — A4216 STERILE WATER/SALINE, 10 ML: HCPCS | Performed by: FAMILY MEDICINE

## 2022-10-20 PROCEDURE — 96375 TX/PRO/DX INJ NEW DRUG ADDON: CPT

## 2022-10-20 PROCEDURE — 6360000004 HC RX CONTRAST MEDICATION: Performed by: STUDENT IN AN ORGANIZED HEALTH CARE EDUCATION/TRAINING PROGRAM

## 2022-10-20 RX ORDER — DEXTROSE MONOHYDRATE 100 MG/ML
INJECTION, SOLUTION INTRAVENOUS CONTINUOUS PRN
Status: DISCONTINUED | OUTPATIENT
Start: 2022-10-20 | End: 2022-10-26 | Stop reason: HOSPADM

## 2022-10-20 RX ORDER — INSULIN LISPRO 100 [IU]/ML
0-4 INJECTION, SOLUTION INTRAVENOUS; SUBCUTANEOUS
Status: DISCONTINUED | OUTPATIENT
Start: 2022-10-20 | End: 2022-10-21

## 2022-10-20 RX ORDER — MAGNESIUM SULFATE IN WATER 40 MG/ML
2000 INJECTION, SOLUTION INTRAVENOUS ONCE
Status: COMPLETED | OUTPATIENT
Start: 2022-10-20 | End: 2022-10-20

## 2022-10-20 RX ORDER — ACETAMINOPHEN 325 MG/1
650 TABLET ORAL EVERY 6 HOURS PRN
Status: DISCONTINUED | OUTPATIENT
Start: 2022-10-20 | End: 2022-10-26 | Stop reason: HOSPADM

## 2022-10-20 RX ORDER — ONDANSETRON 2 MG/ML
8 INJECTION INTRAMUSCULAR; INTRAVENOUS
Status: CANCELLED | OUTPATIENT
Start: 2022-10-20

## 2022-10-20 RX ORDER — MEPERIDINE HYDROCHLORIDE 50 MG/ML
12.5 INJECTION INTRAMUSCULAR; INTRAVENOUS; SUBCUTANEOUS PRN
Status: CANCELLED | OUTPATIENT
Start: 2022-10-20

## 2022-10-20 RX ORDER — SODIUM CHLORIDE 0.9 % (FLUSH) 0.9 %
5-40 SYRINGE (ML) INJECTION PRN
Status: DISCONTINUED | OUTPATIENT
Start: 2022-10-20 | End: 2022-10-26 | Stop reason: HOSPADM

## 2022-10-20 RX ORDER — HEPARIN SODIUM (PORCINE) LOCK FLUSH IV SOLN 100 UNIT/ML 100 UNIT/ML
500 SOLUTION INTRAVENOUS PRN
Status: CANCELLED | OUTPATIENT
Start: 2022-10-20

## 2022-10-20 RX ORDER — DIPHENHYDRAMINE HYDROCHLORIDE 50 MG/ML
50 INJECTION INTRAMUSCULAR; INTRAVENOUS ONCE
Status: COMPLETED | OUTPATIENT
Start: 2022-10-20 | End: 2022-10-20

## 2022-10-20 RX ORDER — SODIUM CHLORIDE 9 MG/ML
5-250 INJECTION, SOLUTION INTRAVENOUS PRN
Status: CANCELLED | OUTPATIENT
Start: 2022-10-20

## 2022-10-20 RX ORDER — FAMOTIDINE 10 MG/ML
20 INJECTION, SOLUTION INTRAVENOUS
Status: CANCELLED | OUTPATIENT
Start: 2022-10-20

## 2022-10-20 RX ORDER — DIPHENHYDRAMINE HYDROCHLORIDE 50 MG/ML
50 INJECTION INTRAMUSCULAR; INTRAVENOUS ONCE
Status: CANCELLED | OUTPATIENT
Start: 2022-10-20 | End: 2022-10-20

## 2022-10-20 RX ORDER — SODIUM CHLORIDE 9 MG/ML
5-250 INJECTION, SOLUTION INTRAVENOUS PRN
Status: DISCONTINUED | OUTPATIENT
Start: 2022-10-20 | End: 2022-10-21 | Stop reason: HOSPADM

## 2022-10-20 RX ORDER — ENOXAPARIN SODIUM 100 MG/ML
30 INJECTION SUBCUTANEOUS DAILY
Status: DISCONTINUED | OUTPATIENT
Start: 2022-10-20 | End: 2022-10-20

## 2022-10-20 RX ORDER — SODIUM CHLORIDE 9 MG/ML
INJECTION, SOLUTION INTRAVENOUS CONTINUOUS
Status: DISCONTINUED | OUTPATIENT
Start: 2022-10-20 | End: 2022-10-21 | Stop reason: HOSPADM

## 2022-10-20 RX ORDER — ACETAMINOPHEN 325 MG/1
650 TABLET ORAL
Status: CANCELLED | OUTPATIENT
Start: 2022-10-20

## 2022-10-20 RX ORDER — 0.9 % SODIUM CHLORIDE 0.9 %
100 INTRAVENOUS SOLUTION INTRAVENOUS ONCE
Status: COMPLETED | OUTPATIENT
Start: 2022-10-20 | End: 2022-10-20

## 2022-10-20 RX ORDER — EPINEPHRINE 1 MG/ML
0.3 INJECTION, SOLUTION, CONCENTRATE INTRAVENOUS PRN
Status: CANCELLED | OUTPATIENT
Start: 2022-10-20

## 2022-10-20 RX ORDER — SUCRALFATE 1 G/1
1 TABLET ORAL EVERY 6 HOURS SCHEDULED
Status: DISCONTINUED | OUTPATIENT
Start: 2022-10-20 | End: 2022-10-20

## 2022-10-20 RX ORDER — ONDANSETRON 2 MG/ML
8 INJECTION INTRAMUSCULAR; INTRAVENOUS ONCE
Status: COMPLETED | OUTPATIENT
Start: 2022-10-20 | End: 2022-10-20

## 2022-10-20 RX ORDER — ONDANSETRON 2 MG/ML
4 INJECTION INTRAMUSCULAR; INTRAVENOUS EVERY 6 HOURS PRN
Status: DISCONTINUED | OUTPATIENT
Start: 2022-10-20 | End: 2022-10-26 | Stop reason: HOSPADM

## 2022-10-20 RX ORDER — METOCLOPRAMIDE HYDROCHLORIDE 5 MG/ML
5 INJECTION INTRAMUSCULAR; INTRAVENOUS EVERY 8 HOURS
Status: DISCONTINUED | OUTPATIENT
Start: 2022-10-20 | End: 2022-10-26 | Stop reason: HOSPADM

## 2022-10-20 RX ORDER — SODIUM CHLORIDE 0.9 % (FLUSH) 0.9 %
5-40 SYRINGE (ML) INJECTION EVERY 12 HOURS SCHEDULED
Status: DISCONTINUED | OUTPATIENT
Start: 2022-10-20 | End: 2022-10-26 | Stop reason: HOSPADM

## 2022-10-20 RX ORDER — POLYETHYLENE GLYCOL 3350 17 G/17G
17 POWDER, FOR SOLUTION ORAL DAILY PRN
Status: DISCONTINUED | OUTPATIENT
Start: 2022-10-20 | End: 2022-10-24

## 2022-10-20 RX ORDER — SODIUM CHLORIDE 0.9 % (FLUSH) 0.9 %
10 SYRINGE (ML) INJECTION PRN
Status: DISCONTINUED | OUTPATIENT
Start: 2022-10-20 | End: 2022-10-21 | Stop reason: HOSPADM

## 2022-10-20 RX ORDER — HYDROMORPHONE HYDROCHLORIDE 1 MG/ML
0.5 INJECTION, SOLUTION INTRAMUSCULAR; INTRAVENOUS; SUBCUTANEOUS EVERY 4 HOURS PRN
Status: DISCONTINUED | OUTPATIENT
Start: 2022-10-20 | End: 2022-10-26 | Stop reason: HOSPADM

## 2022-10-20 RX ORDER — 0.9 % SODIUM CHLORIDE 0.9 %
1000 INTRAVENOUS SOLUTION INTRAVENOUS
Status: COMPLETED | OUTPATIENT
Start: 2022-10-20 | End: 2022-10-20

## 2022-10-20 RX ORDER — SODIUM CHLORIDE 9 MG/ML
5-40 INJECTION INTRAVENOUS PRN
Status: CANCELLED | OUTPATIENT
Start: 2022-10-20

## 2022-10-20 RX ORDER — DIPHENHYDRAMINE HYDROCHLORIDE 50 MG/ML
50 INJECTION INTRAMUSCULAR; INTRAVENOUS
Status: CANCELLED | OUTPATIENT
Start: 2022-10-20

## 2022-10-20 RX ORDER — ACETAMINOPHEN 650 MG/1
650 SUPPOSITORY RECTAL EVERY 6 HOURS PRN
Status: DISCONTINUED | OUTPATIENT
Start: 2022-10-20 | End: 2022-10-26 | Stop reason: HOSPADM

## 2022-10-20 RX ORDER — POTASSIUM CHLORIDE AND SODIUM CHLORIDE 900; 300 MG/100ML; MG/100ML
INJECTION, SOLUTION INTRAVENOUS CONTINUOUS
Status: DISPENSED | OUTPATIENT
Start: 2022-10-20 | End: 2022-10-22

## 2022-10-20 RX ORDER — DEXAMETHASONE SODIUM PHOSPHATE 10 MG/ML
10 INJECTION INTRAMUSCULAR; INTRAVENOUS ONCE
Status: COMPLETED | OUTPATIENT
Start: 2022-10-20 | End: 2022-10-20

## 2022-10-20 RX ORDER — SODIUM CHLORIDE 9 MG/ML
INJECTION, SOLUTION INTRAVENOUS PRN
Status: DISCONTINUED | OUTPATIENT
Start: 2022-10-20 | End: 2022-10-26 | Stop reason: HOSPADM

## 2022-10-20 RX ORDER — ONDANSETRON 2 MG/ML
4 INJECTION INTRAMUSCULAR; INTRAVENOUS
Status: COMPLETED | OUTPATIENT
Start: 2022-10-20 | End: 2022-10-20

## 2022-10-20 RX ORDER — INSULIN LISPRO 100 [IU]/ML
0-4 INJECTION, SOLUTION INTRAVENOUS; SUBCUTANEOUS NIGHTLY
Status: DISCONTINUED | OUTPATIENT
Start: 2022-10-20 | End: 2022-10-21

## 2022-10-20 RX ORDER — ONDANSETRON 4 MG/1
4 TABLET, ORALLY DISINTEGRATING ORAL EVERY 8 HOURS PRN
Status: DISCONTINUED | OUTPATIENT
Start: 2022-10-20 | End: 2022-10-26 | Stop reason: HOSPADM

## 2022-10-20 RX ORDER — ONDANSETRON 2 MG/ML
8 INJECTION INTRAMUSCULAR; INTRAVENOUS ONCE
Status: CANCELLED | OUTPATIENT
Start: 2022-10-20 | End: 2022-10-20

## 2022-10-20 RX ORDER — SODIUM CHLORIDE 0.9 % (FLUSH) 0.9 %
5-40 SYRINGE (ML) INJECTION PRN
Status: DISCONTINUED | OUTPATIENT
Start: 2022-10-20 | End: 2022-10-21 | Stop reason: HOSPADM

## 2022-10-20 RX ORDER — ALBUTEROL SULFATE 90 UG/1
4 AEROSOL, METERED RESPIRATORY (INHALATION) PRN
Status: CANCELLED | OUTPATIENT
Start: 2022-10-20

## 2022-10-20 RX ORDER — SODIUM CHLORIDE 0.9 % (FLUSH) 0.9 %
5-40 SYRINGE (ML) INJECTION PRN
Status: CANCELLED | OUTPATIENT
Start: 2022-10-20

## 2022-10-20 RX ORDER — FAMOTIDINE 10 MG/ML
20 INJECTION, SOLUTION INTRAVENOUS ONCE
Status: CANCELLED | OUTPATIENT
Start: 2022-10-20 | End: 2022-10-20

## 2022-10-20 RX ORDER — SODIUM CHLORIDE 9 MG/ML
INJECTION, SOLUTION INTRAVENOUS CONTINUOUS
Status: CANCELLED | OUTPATIENT
Start: 2022-10-20

## 2022-10-20 RX ORDER — SODIUM CHLORIDE 9 MG/ML
INJECTION, SOLUTION INTRAVENOUS CONTINUOUS
Status: CANCELLED
Start: 2022-10-20

## 2022-10-20 RX ORDER — HYDROMORPHONE HYDROCHLORIDE 1 MG/ML
1 INJECTION, SOLUTION INTRAMUSCULAR; INTRAVENOUS; SUBCUTANEOUS
Status: COMPLETED | OUTPATIENT
Start: 2022-10-20 | End: 2022-10-20

## 2022-10-20 RX ORDER — SODIUM CHLORIDE 0.9 % (FLUSH) 0.9 %
10 SYRINGE (ML) INJECTION
Status: COMPLETED | OUTPATIENT
Start: 2022-10-20 | End: 2022-10-20

## 2022-10-20 RX ADMIN — DIPHENHYDRAMINE HYDROCHLORIDE 50 MG: 50 INJECTION, SOLUTION INTRAMUSCULAR; INTRAVENOUS at 12:28

## 2022-10-20 RX ADMIN — Medication 10 ML: at 10:46

## 2022-10-20 RX ADMIN — PACLITAXEL 108 MG: 6 INJECTION, SOLUTION, CONCENTRATE INTRAVENOUS at 13:12

## 2022-10-20 RX ADMIN — SODIUM CHLORIDE 25 ML/HR: 9 INJECTION, SOLUTION INTRAVENOUS at 13:02

## 2022-10-20 RX ADMIN — SODIUM CHLORIDE, PRESERVATIVE FREE 10 ML: 5 INJECTION INTRAVENOUS at 17:28

## 2022-10-20 RX ADMIN — ONDANSETRON 8 MG: 2 INJECTION INTRAMUSCULAR; INTRAVENOUS at 12:26

## 2022-10-20 RX ADMIN — DEXAMETHASONE SODIUM PHOSPHATE 10 MG: 10 INJECTION INTRAMUSCULAR; INTRAVENOUS at 12:27

## 2022-10-20 RX ADMIN — ONDANSETRON 4 MG: 2 INJECTION INTRAMUSCULAR; INTRAVENOUS at 16:48

## 2022-10-20 RX ADMIN — SODIUM CHLORIDE: 9 INJECTION, SOLUTION INTRAVENOUS at 12:00

## 2022-10-20 RX ADMIN — MAGNESIUM SULFATE HEPTAHYDRATE 2000 MG: 40 INJECTION, SOLUTION INTRAVENOUS at 18:05

## 2022-10-20 RX ADMIN — POTASSIUM CHLORIDE AND SODIUM CHLORIDE: 900; 300 INJECTION, SOLUTION INTRAVENOUS at 21:57

## 2022-10-20 RX ADMIN — SODIUM CHLORIDE 1000 ML: 9 INJECTION, SOLUTION INTRAVENOUS at 16:55

## 2022-10-20 RX ADMIN — ONDANSETRON 4 MG: 2 INJECTION INTRAMUSCULAR; INTRAVENOUS at 22:44

## 2022-10-20 RX ADMIN — HYDROMORPHONE HYDROCHLORIDE 1 MG: 1 INJECTION, SOLUTION INTRAMUSCULAR; INTRAVENOUS; SUBCUTANEOUS at 16:48

## 2022-10-20 RX ADMIN — FAMOTIDINE 20 MG: 10 INJECTION, SOLUTION INTRAVENOUS at 12:24

## 2022-10-20 RX ADMIN — METOCLOPRAMIDE HYDROCHLORIDE 5 MG: 5 INJECTION INTRAMUSCULAR; INTRAVENOUS at 22:31

## 2022-10-20 RX ADMIN — SODIUM CHLORIDE 100 ML: 9 INJECTION, SOLUTION INTRAVENOUS at 17:29

## 2022-10-20 RX ADMIN — SODIUM CHLORIDE, PRESERVATIVE FREE 10 ML: 5 INJECTION INTRAVENOUS at 11:55

## 2022-10-20 RX ADMIN — HYDROMORPHONE HYDROCHLORIDE 0.5 MG: 1 INJECTION, SOLUTION INTRAMUSCULAR; INTRAVENOUS; SUBCUTANEOUS at 22:31

## 2022-10-20 RX ADMIN — SODIUM CHLORIDE 40 MG: 9 INJECTION, SOLUTION INTRAMUSCULAR; INTRAVENOUS; SUBCUTANEOUS at 16:48

## 2022-10-20 RX ADMIN — IOPAMIDOL 100 ML: 755 INJECTION, SOLUTION INTRAVENOUS at 17:29

## 2022-10-20 RX ADMIN — PANTOPRAZOLE SODIUM 40 MG: 40 INJECTION, POWDER, LYOPHILIZED, FOR SOLUTION INTRAVENOUS at 22:31

## 2022-10-20 RX ADMIN — SODIUM CHLORIDE, PRESERVATIVE FREE 10 ML: 5 INJECTION INTRAVENOUS at 22:32

## 2022-10-20 ASSESSMENT — PAIN SCALES - GENERAL
PAINLEVEL_OUTOF10: 8
PAINLEVEL_OUTOF10: 10
PAINLEVEL_OUTOF10: 4
PAINLEVEL_OUTOF10: 10

## 2022-10-20 ASSESSMENT — PAIN DESCRIPTION - ONSET: ONSET: GRADUAL

## 2022-10-20 ASSESSMENT — PATIENT HEALTH QUESTIONNAIRE - PHQ9
SUM OF ALL RESPONSES TO PHQ9 QUESTIONS 1 & 2: 0
2. FEELING DOWN, DEPRESSED OR HOPELESS: 0
SUM OF ALL RESPONSES TO PHQ QUESTIONS 1-9: 0
SUM OF ALL RESPONSES TO PHQ QUESTIONS 1-9: 0
1. LITTLE INTEREST OR PLEASURE IN DOING THINGS: 0
SUM OF ALL RESPONSES TO PHQ QUESTIONS 1-9: 0
SUM OF ALL RESPONSES TO PHQ QUESTIONS 1-9: 0

## 2022-10-20 ASSESSMENT — PAIN DESCRIPTION - DESCRIPTORS
DESCRIPTORS: PRESSURE
DESCRIPTORS: ACHING

## 2022-10-20 ASSESSMENT — ENCOUNTER SYMPTOMS
ABDOMINAL PAIN: 1
WHEEZING: 0
COUGH: 0
RHINORRHEA: 0
SHORTNESS OF BREATH: 0
CONSTIPATION: 1
COLOR CHANGE: 0
EYE PAIN: 0
EYE DISCHARGE: 0
ANAL BLEEDING: 0
SORE THROAT: 0
APNEA: 0
EYE ITCHING: 0
ABDOMINAL DISTENTION: 0
VOMITING: 1
NAUSEA: 1
DIARRHEA: 1
CHEST TIGHTNESS: 0
EYE REDNESS: 0
BACK PAIN: 0

## 2022-10-20 ASSESSMENT — PAIN DESCRIPTION - PAIN TYPE
TYPE: ACUTE PAIN
TYPE: ACUTE PAIN

## 2022-10-20 ASSESSMENT — PAIN DESCRIPTION - FREQUENCY
FREQUENCY: CONTINUOUS
FREQUENCY: CONTINUOUS

## 2022-10-20 ASSESSMENT — PAIN DESCRIPTION - LOCATION
LOCATION: ABDOMEN
LOCATION: ABDOMEN

## 2022-10-20 ASSESSMENT — PAIN DESCRIPTION - ORIENTATION: ORIENTATION: INNER

## 2022-10-20 NOTE — PROGRESS NOTES
Arrived to the [facility]. 1 L NS and Taxol completed. Patient tolerated well. Any issues or concerns during appointment: none. Patient aware of next infusion appointment on 10/23/22 (date) at 0800 (time). Patient aware of next lab and [facility] office visit on 10/27/22 (date) at 21 [phone] (time). Patient instructed to call provider with temperature of 100.4 or greater or nausea/vomiting/ diarrhea or pain not controlled by medications  Discharged via wheelchair accompanied by mom.

## 2022-10-20 NOTE — PROGRESS NOTES
Patient arrived to port lab for port access and lab draw   Helen Rhoades 45 accessed and labs drawn per protocol   *Port remains accessed   Patient discharged from port lab via w/c*

## 2022-10-20 NOTE — ED PROVIDER NOTES
Emergency Department Provider Note                   PCP:                None Provider               Age: 52 y.o. Sex: female     No diagnosis found. DISPOSITION          MDM  Number of Diagnoses or Management Options  Chronic abdominal pain: established, worsening  Dysphagia, unspecified type: established, worsening  Metastatic carcinoma ([address]): established, worsening  Diagnosis management comments: Patient arrives to this department slightly hypertensive and tachycardic. She is ill-appearing on exam and struggles to keep down fluids. She is received Zofran, Protonix and pain medication in this department. She states her abdominal discomfort has improved. CT imaging shows no evidence of obstructive pathology or acute change. Known metastatic process present on CT imaging. X-ray imaging of the chest is unremarkable. Patient does not feel comfortable with discharge home and follow-up with GI providers. Attempt was made to arrange outpatient EGD referral through oncology but patient is unsure when this will occur. I have consulted GI and the hospitalist for primary admission. Amount and/or Complexity of Data Reviewed  Clinical lab tests: reviewed and ordered  Tests in the radiology section of CPT®: ordered and reviewed  Tests in the medicine section of CPT®: ordered and reviewed  Review and summarize past medical records: yes  Discuss the patient with other providers: yes  Independent visualization of images, tracings, or specimens: yes    Risk of Complications, Morbidity, and/or Mortality  Presenting problems: moderate  Diagnostic procedures: low  Management options: moderate    Patient Progress  Patient progress: stable       ED Course as of 10/20/22 2031   Thu Oct 20, 2022   1702 EKG interpretation: Sinus tachycardia, rate of 113, normal axis, some depression noted inferiorly and in the precordial leads as well.  [BR]      ED Course User Index  [BR] Margot Boyle DO        Orders Placed This Encounter   Procedures    XR CHEST (2 VW)    CT ABDOMEN PELVIS W IV CONTRAST Additional Contrast? None    CBC with Auto Differential    Comprehensive Metabolic Panel    Lipase    Magnesium    Troponin    EKG 12 Lead        Medications   pantoprazole (PROTONIX) 40 mg in sodium chloride (PF) 10 mL injection (has no administration in time range)   0.9 % sodium chloride bolus (has no administration in time range)   HYDROmorphone HCl PF (DILAUDID) injection 1 mg (has no administration in time range)   ondansetron (ZOFRAN) injection 4 mg (has no administration in time range)   sodium chloride flush 0.9 % injection 10 mL (has no administration in time range)   0.9 % sodium chloride bolus (has no administration in time range)   iopamidol (ISOVUE-370) 76 % injection 100 mL (has no administration in time range)       New Prescriptions    No medications on file        Lory Reaves is a 52 y.o. female who presents to the Emergency Department with chief complaint of    Chief Complaint   Patient presents with    Abdominal Pain      15-year-old female patient presents to this department from outpatient oncology clinic with reports of abdominal pain nausea and vomiting. Patient states she has the inability to hold down food or fluids and states when she attempts to swallow meds some fluids and food, it feels as though it gets stuck in her throat. She then spits up what ever she has just ingested. She reports history of esophageal stricture requiring dilation in the past.  Patient describes a discomfort in the center of her chest and burning in her throat. She reports no multiple episodes of nausea and vomiting and diffuse abdominal discomfort. Patient has history of metastatic carcinoma of unclear origin. The suspicion is that patient's metastasis started in the upper GI tract per review of patient note. She is undergoing chemotherapy currently and was receiving her treatment today.   Patient denies associated fever or chills. She reports occasional shortness of breath with exertion. The history is provided by the patient. No  was used. Review of Systems   Constitutional:  Positive for appetite change and fatigue. Negative for activity change, chills and fever. HENT:  Negative for congestion, ear discharge, ear pain, rhinorrhea and sore throat. Eyes:  Negative for pain, discharge, redness, itching and visual disturbance. Respiratory:  Negative for apnea, cough, chest tightness, shortness of breath and wheezing. Cardiovascular:  Negative for chest pain, palpitations and leg swelling. Gastrointestinal:  Positive for abdominal pain, constipation, diarrhea, nausea and vomiting. Negative for abdominal distention and anal bleeding. Genitourinary:  Negative for difficulty urinating, dysuria, flank pain, frequency, hematuria, pelvic pain, vaginal bleeding and vaginal discharge. Musculoskeletal:  Negative for arthralgias, back pain, myalgias, neck pain and neck stiffness. Skin:  Negative for color change, pallor, rash and wound. Neurological:  Positive for weakness. Negative for dizziness, tremors, facial asymmetry, light-headedness, numbness and headaches. Psychiatric/Behavioral:  Negative for agitation, behavioral problems, confusion, self-injury and suicidal ideas. The patient is not nervous/anxious. All other systems reviewed and are negative.     Past Medical History:   Diagnosis Date    Anemia     2012-- not a problem since hyst    Colon cancer ([facility] [address] dx 2/2022     plans for chemo--- followed by dr Elaine EL-19 12/2020    no hospitalization    GERD (gastroesophageal reflux disease)     managed with med    History of colonoscopy 05/2018    Dr. Marbella Stern, nl (see media note), R 2028    Hx of blood clots 06/2022    per pt \"small clot on lung identified by CT scan\"  CT Scan impression:- Nonobstructive pulmonary filling defect involving Left Lower Lobe     Hypotension asymptomatic    Obstruction of fallopian tube     per pt has \"1 good tube\"    Peritoneal carcinomatosis ([address]     Weight loss     80lbs weight loss after gastric sleeve        Past Surgical History:   Procedure Laterality Date     SECTION      CYSTOSCOPY Bilateral 2022    CYSTOSCOPY BILATERAL RETROGRADE PYELOGRAM performed by Eileen Pro MD at [address] Bilateral 2022    BILATERAL CYSTOSCOPY URETERAL STENT EXCHANGE performed by Eileen Pro MD at [address] Bilateral 10/6/2022    CYSTOSCOPY BILATERAL URETERAL STENT REMOVAL, RIGHT URETERAL STENT PLACEMENT performed by Eileen Pro MD at [facility] MAIN OR    GASTRIC BYPASS SURGERY  2014    gastric sleeve- Choudhari    HYSTERECTOMY (CERVIX STATUS UNKNOWN)      2018    HYSTERECTOMY (CERVIX STATUS UNKNOWN)  2020    TLH w/ Bilateral salpingectomy and left oophorectomy    IR PERC CATH PLEURAL DRAIN W/IMAG  2022    IR PERC CATH PLEURAL DRAIN W/IMAG 2022 [facility]    IR PORT PLACEMENT EQUAL OR GREATER THAN 5 YEARS  2022    IR PORT PLACEMENT EQUAL OR GREATER THAN 5 YEARS  2022    IR PORT PLACEMENT EQUAL OR GREATER THAN 5 YEARS 2022 [facility]    LAPAROTOMY N/A 2022    EXPLORATORY LAPAROTOMY/ ENTEROENTEROSTOMY performed by Digna Winkler MD at [facility]. [address]  age [address] 21s\"    also \"unblocked her FT\"    TONSILLECTOMY      UPPER GASTROINTESTINAL ENDOSCOPY      with dilation    UPPER GASTROINTESTINAL ENDOSCOPY N/A 2022    EGD ESOPHAGOGASTRODUODENOSCOPY OFL 17 To IR after recovery for Para performed by Cha Glass MD at [facility] ENDOSCOPY    UROLOGICAL SURGERY Bilateral 03/15/2022    cysto        Family History   Problem Relation Age of Onset    Heart Disease Maternal Grandmother     Hypertension Maternal Grandmother     Heart Disease Maternal Grandfather     Hypertension Maternal Grandfather     Pulmonary Embolism Neg Hx     Colon Cancer Neg Hx     Deep Vein Thrombosis Neg Hx     Ovarian Cancer Neg Hx     Prostate Cancer Neg Hx     Breast Cancer Neg Hx         Social History     Socioeconomic History    Marital status: Single   Tobacco Use    Smoking status: Never    Smokeless tobacco: Never   Vaping Use    Vaping Use: Never used   Substance and Sexual Activity    Alcohol use: Not Currently    Drug use: No   Social History Narrative    1. Use large speculum. 2.  sister, Talia Washington #[id number] and mom is Otoniel Villatoro #[id number] (both Kofoed's pts)  3. PCP  Dr. Belle Swanson ([facility]), GI Dr. Macy Bello-2018 normal EGD         Patient has no known allergies. Previous Medications    APIXABAN (ELIQUIS) 5 MG TABS TABLET    Take 1 tablet by mouth in the morning and 1 tablet before bedtime. DOCUSATE SODIUM (COLACE, DULCOLAX) 100 MG CAPS    Take 100 mg by mouth 2 times daily    GABAPENTIN (NEURONTIN) 300 MG CAPSULE    Take 1 capsule by mouth 3 times daily for 15 days. LIDOCAINE-PRILOCAINE (EMLA) 2.5-2.5 % CREAM    Apply to port about 45 minutes prior to access    METOCLOPRAMIDE (REGLAN) 5 MG TABLET    Take 1 tablet by mouth daily    ONDANSETRON (ZOFRAN) 8 MG TABLET    Take 8 mg by mouth every 8 hours as needed    PANTOPRAZOLE (PROTONIX) 40 MG TABLET    Take 1 tablet by mouth in the morning and at bedtime    POLYETHYLENE GLYCOL (GLYCOLAX) 17 G PACKET    Take 17 g by mouth daily as needed for Constipation    POTASSIUM CHLORIDE (KLOR-CON M) 20 MEQ EXTENDED RELEASE TABLET    Take 1 tablet by mouth 2 times daily    SENNOSIDES-DOCUSATE SODIUM (SENOKOT-S) 8.6-50 MG TABLET    Take 2 tablets by mouth daily as needed for Constipation    SUCRALFATE (CARAFATE) 1 GM TABLET    Take 1 tablet by mouth 4 times daily        Vitals signs and nursing note reviewed. Patient Vitals for the past 4 hrs:   Temp Pulse Resp BP SpO2   10/20/22 1614 98.5 °F (36.9 °C) (!) 109 16 (!) 126/96 96 %          Physical Exam  Vitals and nursing note reviewed.    Constitutional: General: She is not in acute distress. Appearance: Normal appearance. She is normal weight. She is not ill-appearing or toxic-appearing. Comments: Ill appearing female patient, alert and oriented x4. No acute distress, speaks in clear, fluid sentences. HENT:      Head: Normocephalic and atraumatic. Right Ear: External ear normal.      Left Ear: External ear normal.      Nose: Nose normal.      Mouth/Throat:      Mouth: Mucous membranes are moist.   Eyes:      General: No scleral icterus. Right eye: No discharge. Left eye: No discharge. Extraocular Movements: Extraocular movements intact. Cardiovascular:      Rate and Rhythm: Normal rate and regular rhythm. Pulses: Normal pulses. Heart sounds: Normal heart sounds. Pulmonary:      Effort: Pulmonary effort is normal. No tachypnea, bradypnea, accessory muscle usage, prolonged expiration or respiratory distress. Breath sounds: Normal breath sounds and air entry. No stridor. No decreased breath sounds, wheezing, rhonchi or rales. Abdominal:      General: Abdomen is flat. Bowel sounds are increased. There is no distension. Palpations: There is no mass. Tenderness: There is generalized abdominal tenderness. There is no right CVA tenderness, left CVA tenderness, guarding or rebound. Negative signs include Manzo's sign and McBurney's sign. Hernia: No hernia is present. Comments: Generally tender, increased bowel sounds   Musculoskeletal:         General: No swelling, tenderness or deformity. Normal range of motion. Cervical back: Normal range of motion. Skin:     General: Skin is warm. Capillary Refill: Capillary refill takes less than 2 seconds. Neurological:      General: No focal deficit present. Mental Status: She is alert.    Psychiatric:         Mood and Affect: Mood normal.        Procedures    Results for orders placed or performed during the hospital encounter of 10/20/22   CBC with Auto Differential   Result Value Ref Range    WBC 5.7 4.3 - 11.1 K/uL    RBC 3.72 (L) 4.05 - 5.2 M/uL    Hemoglobin 10.1 (L) 11.7 - 15.4 g/dL    Hematocrit 32.6 (L) 35.8 - 46.3 %    MCV 87.6 82.0 - 102.0 FL    MCH 27.2 26.1 - 32.9 PG    MCHC 31.0 (L) 31.4 - 35.0 g/dL    RDW 17.1 (H) 11.9 - 14.6 %    Platelets 358 270 - 360 K/uL    MPV 9.6 9.4 - 12.3 FL    nRBC 0.02 0.0 - 0.2 K/uL    Differential Type AUTOMATED      Seg Neutrophils 60 43 - 78 %    Lymphocytes 33 13 - 44 %    Monocytes 6 4.0 - 12.0 %    Eosinophils % 1 0.5 - 7.8 %    Basophils 0 0.0 - 2.0 %    Immature Granulocytes 1 0.0 - 5.0 %    Segs Absolute 3.4 1.7 - 8.2 K/UL    Absolute Lymph # 1.9 0.5 - 4.6 K/UL    Absolute Mono # 0.4 0.1 - 1.3 K/UL    Absolute Eos # 0.0 0.0 - 0.8 K/UL    Basophils Absolute 0.0 0.0 - 0.2 K/UL    Absolute Immature Granulocyte 0.0 0.0 - 0.5 K/UL   Comprehensive Metabolic Panel   Result Value Ref Range    Sodium 134 133 - 143 mmol/L    Potassium 3.6 3.5 - 5.1 mmol/L    Chloride 97 (L) 101 - 110 mmol/L    CO2 27 21 - 32 mmol/L    Anion Gap 10 2 - 11 mmol/L    Glucose 187 (H) 65 - 100 mg/dL    BUN 11 6 - 23 MG/DL    Creatinine 0.50 (L) 0.6 - 1.0 MG/DL    Est, Glom Filt Rate >60 >60 ml/min/1.73m2    Calcium 8.5 8.3 - 10.4 MG/DL    Total Bilirubin 0.4 0.2 - 1.1 MG/DL    ALT 13 12 - 65 U/L    AST 13 (L) 15 - 37 U/L    Alk Phosphatase 90 50 - 136 U/L    Total Protein 6.5 6.3 - 8.2 g/dL    Albumin 3.0 (L) 3.5 - 5.0 g/dL    Globulin 3.5 2.8 - 4.5 g/dL    Albumin/Globulin Ratio 0.9 0.4 - 1.6          XR CHEST (2 VW)    (Results Pending)   CT ABDOMEN PELVIS W IV CONTRAST Additional Contrast? None    (Results Pending)                       Voice dictation software was used during the making of this note. This software is not perfect and grammatical and other typographical errors may be present. This note has not been completely proofread for errors.         Yadiel Brown,   10/20/22 2034

## 2022-10-20 NOTE — PATIENT INSTRUCTIONS
Patient Instructions from Today's Visit    Reason for Visit:  Prechemo visit    Diagnosis Information:  [email or web address]      Plan:  -We will refer you to GI (Dr Myron Linares) for possible dilation and EGD  -Fluids today and Sunday  -Increase Reglan to 3 times a day    Follow Up:  As scheduled    Recent Lab Results:  Hospital Outpatient Visit on 10/20/2022   Component Date Value Ref Range Status    WBC 10/20/2022 5.7  4.3 - 11.1 K/uL Final    RBC 10/20/2022 3.72 (A)  4.05 - 5.2 M/uL Final    Hemoglobin 10/20/2022 10.1 (A)  11.7 - 15.4 g/dL Final    Hematocrit 10/20/2022 32.6 (A)  35.8 - 46.3 % Final    MCV 10/20/2022 87.6  82.0 - 102.0 FL Final    MCH 10/20/2022 27.2  26.1 - 32.9 PG Final    MCHC 10/20/2022 31.0 (A)  31.4 - 35.0 g/dL Final    RDW 10/20/2022 17.1 (A)  11.9 - 14.6 % Final    Platelets 49/34/8187 351  150 - 450 K/uL Final    MPV 10/20/2022 9.6  9.4 - 12.3 FL Final    nRBC 10/20/2022 0.02  0.0 - 0.2 K/uL Final    **Note: Absolute NRBC parameter is now reported with Hemogram**    Differential Type 10/20/2022 AUTOMATED    Final    Seg Neutrophils 10/20/2022 60  43 - 78 % Final    Lymphocytes 10/20/2022 33  13 - 44 % Final    Monocytes 10/20/2022 6  4.0 - 12.0 % Final    Eosinophils % 10/20/2022 1  0.5 - 7.8 % Final    Basophils 10/20/2022 0  0.0 - 2.0 % Final    Immature Granulocytes 10/20/2022 1  0.0 - 5.0 % Final    Segs Absolute 10/20/2022 3.4  1.7 - 8.2 K/UL Final    Absolute Lymph # 10/20/2022 1.9  0.5 - 4.6 K/UL Final    Absolute Mono # 10/20/2022 0.4  0.1 - 1.3 K/UL Final    Absolute Eos # 10/20/2022 0.0  0.0 - 0.8 K/UL Final    Basophils Absolute 10/20/2022 0.0  0.0 - 0.2 K/UL Final    Absolute Immature Granulocyte 10/20/2022 0.0  0.0 - 0.5 K/UL Final        Treatment Summary has been discussed and given to patient: n/a        -------------------------------------------------------------------------------------------------------------------  Please call our office at [phone] if you have any  of the following symptoms:   Fever of 100.5 or greater  Chills  Shortness of breath  Swelling or pain in one leg    After office hours an answering service is available and will contact a provider for emergencies or if you are experiencing any of the above symptoms. Patient does express an interest in My Chart. My Chart log in information explained on the after visit summary printout at the [address] desk.     TRI Stephens RN, BSN  Nurse Navigator  [phone] cell  [email or web address]

## 2022-10-20 NOTE — PROGRESS NOTES
HEMATOLOGY/ONCOLOGY FOLLOWUP  VISIT      Patient Name: Shayy Bedoya             Date of Visit: 10/20/2022  : 1975  Age:47 y.o. Presenting Complaint:  Shayy Bedoya  is seen in follow-up for carcinoma of unknown primary. History of Present Illness:  Ms. Desi Garcia was seen for the first time in our office in 2022. She was a woman of previously good health who began noticing left-sided back discomfort in early 2022. This was associated  ultimately with a sense of difficulty swallowing and ultimately she presented to [facility] for evaluation. Her symptoms were felt to potentially be indicative of a bowel obstruction and she was sent for a CT scan. This study, performed on 2022  was notable for a linear calcific density along the course of the proximal left ureter with associated moderate hydronephrosis potentially indicative of an intraureteral calculus. There was also stranding throughout the retroperitoneal soft tissues anterior  to the left psoas muscle with pelvic ascites. There was also wall thickening involving the descending colon. The question of colitis or serosal implants was raised. The patient had undergone a gastric sleeve placement several years prior. She had  also undergone a negative colonoscopy about 3 years prior to these presentations. There was a history of anemia ultimately resulting in the performance of a hysterectomy approximately 3 years ago for menorrhagia. Because of the CT scan findings, she  was ultimately referred to Dr. Clara Gandara for evaluation of a possible pelvic malignancy. On 2022 he performed a laparoscopy and biopsy with evidence of peritoneal carcinomatosis grossly. A \"peritoneal nodule\" and a \"peritoneal implant\"  were both positive for a poorly differentiated metastatic carcinoma potentially consistent with an upper gastrointestinal primary.   An omental biopsy showed no evidence of malignancy. Immunohistochemistries were positive for cytokeratin 7 and negative  for cytokeratin 20. The tumor was weakly positive for CDX2. The only other positive study was MOC-31. Testing through [employer] demonstrated no mutation and ERB-B2. There was no microsatellite instability and no mismatch repair protein deficiencies. There were no targetable lesions. A PET scan was subsequently performed on  showing elevated FDG uptake in the left pelvis corresponding with a masslike density concerning  for peritoneal carcinomatosis. There was a small volume of free fluid within the peritoneal cavity. There was moderate right hydroureteronephrosis. Despite the pathologic findings, there was no evidence of FDG activity in the upper gastrointestinal  tract. CA-125 was slightly elevated at 44. Given the uncertainty of her diagnosis, the patient went back for additional surgery on February 15 at which time she underwent bilateral ureteral stent placement and bilateral salpingo-oophorectomies. Pathology  from that surgery was notable for poorly differentiated carcinoma with occasional signet ring features. Immunohistochemical stains were most consistent with a tumor of the upper gastrointestinal tract. She is  1 para 1 abortus 0. There is no  family history of cancer. She has had no difficulties with vomiting. She still notes that some food traverses her esophagus slowly. She had undergone a prior dilatation without any evidence of cancer. She subsequently began treatment with FOLFOX ultimately  with the addition of bevacizumab in 2022. She ultimately received 8 cycles of FOLFOX most of them with Avastin. On , she was taken to surgery for evaluation of debulking and HIPEC. Unfortunately, at the time of surgery her tumor was found to be tightly adherent and impossible to debulk.   Despite the FOLFOX, she developed progressive ascites necessitating placement of a Pleurx catheter. Based on this symptomatic progression, a decision was made to place her on paclitaxel and ramucirumab. She returns for follow-up and D8 of cycle 1 of taxol/ramucirumab. She tolerated the treatment fine. However, she remains debilitated and is losing weight and complains of fatigue. Her weight is down 13# in the last week. She denies nausea but reports that she feels like things are getting stuck and she is vomiting without associated nausea. She is tolerating clears ok but not really able to eat more solid foods. She has a history of esophageal strictures. She reports her bowel function is ok but does have some constipation. However, yesterday she suddenly developed diarrhea without taking anti-constipation medications. She reports today that she has had very little pain. She is out of pain meds and has a refill waiting at the pharmacy that she has not filled because she hasn't required any pain meds. Her ascites has improved and she is only getting about 300cc every other day which she reports is improved since receiving chemotherapy last week. Neuropathy is ongoing but stable. She continues to have intermittent vomiting and has used our infusion suite for volume resuscitation on occasion. Denies fever or infectious symptoms.      Medications:   Current Outpatient Medications   Medication Sig Dispense Refill    metoclopramide (REGLAN) 5 MG tablet Take 1 tablet by mouth daily 120 tablet 3    sennosides-docusate sodium (SENOKOT-S) 8.6-50 MG tablet Take 2 tablets by mouth daily as needed for Constipation 90 tablet 0    polyethylene glycol (GLYCOLAX) 17 g packet Take 17 g by mouth daily as needed for Constipation 527 g 1    sucralfate (CARAFATE) 1 GM tablet Take 1 tablet by mouth 4 times daily 120 tablet 3    pantoprazole (PROTONIX) 40 MG tablet Take 1 tablet by mouth in the morning and at bedtime 60 tablet 0    docusate sodium (COLACE, DULCOLAX) 100 MG CAPS Take 100 mg by mouth 2 times daily apixaban (ELIQUIS) 5 MG TABS tablet Take 1 tablet by mouth in the morning and 1 tablet before bedtime. 60 tablet 0    potassium chloride (KLOR-CON M) 20 MEQ extended release tablet Take 1 tablet by mouth 2 times daily 60 tablet 1    lidocaine-prilocaine (EMLA) 2.5-2.5 % cream Apply to port about 45 minutes prior to access      ondansetron (ZOFRAN) 8 MG tablet Take 8 mg by mouth every 8 hours as needed      gabapentin (NEURONTIN) 300 MG capsule Take 1 capsule by mouth 3 times daily for 15 days. (Patient not taking: No sig reported) 45 capsule 0     No current facility-administered medications for this visit. Facility-Administered Medications Ordered in Other Visits   Medication Dose Route Frequency Provider Last Rate Last Admin    sodium chloride flush 0.9 % injection 10 mL  10 mL IntraVENous PRN Nick Guaman MD   10 mL at 10/20/22 1046       Allergies:  No Known Allergies    Review of Systems: The Review of Systems is documented in full in the internal medical record. All systems are negative other than for those noted above. Past Medical History:  Documented in electronic medical record    Past Surgical History:  Documented in electronic medical record    Social History:  Documented in electronic medical record    Family History:  Documented in electronic medical record      Physical Examination:  General Appearance: Chronically ill appearing, cachectic patient in no acute distress. Vital signs: /87 (Site: Right Upper Arm, Position: Sitting, Cuff Size: Medium Adult)   Pulse (!) 132   Temp 97.7 °F (36.5 °C) (Oral)   Resp 17   Ht 5' 4\" (1.626 m)   Wt 92 lb 1.6 oz (41.8 kg)   SpO2 94%   BMI 15.81 kg/m²     Performance status: ECOG Level:3  Distress  Screening Score: PHQ-9 Total Score: 0 (10/20/2022 11:09 AM)  Pain Score:   0 - No pain (fatigue-10)    HEENT: No oral exam.  Neck: Supple. There is no thyromegaly. Lymph nodes:  There is no cervical, supraclavicular, axillary or inguinal adenopathy. Breasts: Not examined. Lungs: The lungs are clear to auscultation and percussion. There is no egophony. There is no chest wall tenderness and no  use of accessory respiratory musculature. Heart: There is no jugular venous distention. The rate is tachycardic and rhythm regular. The S1 and S2 are normal and there are no murmurs or rubs. Abdomen: Soft, non-tender, distended, bowel sounds present and normal, no appreciated hepatosplenomegaly. No palpable masses. Abdominal Pleurx catheter in place. Skin: No rash, petechiae or ecchymoses. No evidence of malignancy. Extremities: No cyanosis, clubbing; trace lower extremity edema. Labs/Imaging:  Lab Results   Component Value Date/Time    WBC 5.7 10/20/2022 10:37 AM    HGB 10.1 10/20/2022 10:37 AM    HCT 32.6 10/20/2022 10:37 AM     10/20/2022 10:37 AM    MCV 87.6 10/20/2022 10:37 AM       Lab Results   Component Value Date/Time     10/20/2022 10:37 AM    K 3.6 10/20/2022 10:37 AM    CL 97 10/20/2022 10:37 AM    CO2 27 10/20/2022 10:37 AM    BUN 11 10/20/2022 10:37 AM    GFRAA >60 09/27/2022 02:37 AM    GLOB 3.5 10/20/2022 10:37 AM    ALT 13 10/20/2022 10:37 AM       Above results reviewed with patient. ASSESSMENT:   Ms. Reina Bee has an apparent metastatic carcinoma of unknown primary. Pathologically, the tumor seems to resemble a process arising in the upper gastrointestinal tract. However, any such primary is  not visible on EGD or PET scanning. Dr. Shanthi Martino presumption is that this may have originated from the colon based on its location and behavior. That too would be somewhat inconsistent with the pathology findings. She has been treated with FOLFOX and Avastin. There was certainly no symptomatic response and in fact she developed progressive and symptomatic ascites suggesting disease progression. As a result, a decision was made to proceed with paclitaxel and ramucirumab.   This had not yet begun because of her recent hospitalization but initiated last week and tolerated treatment well although general health continues to decline. She is adamant about receiving treatment today and feels that this has helped her ascites. She, however, is struggling to main adequate nutrition and hydration secondary to what may be an esophageal stricture which she has had in the past and required dilation. She is tachycardic and appears dehydrated although labs acceptable. PLAN:  We will proceed with C1D8 paclitaxel and Cyramza today. Will increase Reglan to TID and arrange for outpatient GI referral. Will give IVF today and bring back to infusion in between now and next week's visit for labs/replacements. We will arrange for PC to see patient next week to talk about goals of care and symptom management.     [facility]USCIT[facility] DIRECTIVES/HOSPICE CARE;  Full Support               SCOTT Colon  Hematology & Oncology  [address] [address]  Office : [phone]  Fax : [phone]

## 2022-10-20 NOTE — H&P
Hospitalist History and Physical   Admit Date:  10/20/2022  3:90 PM   Name:  Elex Holstein   Age:  52 y.o. Sex:  female  :  1975   MRN:  [id number]   Room:  [place] [address]    Presenting Complaint: Abdominal Pain     Reason(s) for Admission: Intractable nausea and vomiting [R11.2]     History of Present Illness:   Elex Holstein is a 52 y.o. female with medical history of sleeve gastrectomy, hydronephrosis s/p ureteral stents recent R stent exchange with unsuccessful L exchange due to tumor, metastatic carcinoma of possible GI primary with known peritoneal carcinomatosis on diagnosis s/p  C9 FOLFOX/Avastin 22 and s/p ex lap w/22 attempted debulking surgery on 2022 but unsuccessful d/t adhesions, recent GIB w/ negative EGD and recurrent ascites s/p pleurx drain on 22, DVT on eliquis  who presented with n/v and abdominal pain associated with difficulty swallowing both foods and fluids. Started having difficulty swallowing about 3 days ago that progressive worsened, feels like it gets stuck in her throat and she vomits. No flatus today. Last BM was yesterday and it was loose. History of prior esophogeal strictures in the past. Seen by Dr Tosha Valadez previously. Was seen by oncology today with plans for outpatient EGD however presented to ED due to intractable n/v and unable to tolerate PO meds. S/p C1D8 paclitaxel and Cyramza today and rec'd 1 L NS at infusion center. In the ED, afebrile -132 /87 labs hgb 10.1 mag 1.4 K 3.6     She rec'd 1 L NS, IV PPI, dilaudid 1 mg IV, malik 4 mg, mag 2g in ED. Patient feels better since rec'ing meds in ED. Last admission concerns for colitis vs serosal implants of tumor and worsening hydronephrosis s/p R stent exchange but unable to exchange L due to possible tumor involvement. Discharged on cipro/flagyl x 14 days per ID for suspected SBP      EGD 22  Tiny Sussy-Zamorano tear noted at [facility].      Review of Systems:  10 systems reviewed and negative except as noted in HPI. Assessment & Plan:     Intractable n/v // moderate dehydration // dysphagia - admit remote tele. IVF. Replete electrolytes. Consult GI. Metastatic carcinoma w/ peritoneal carcinomatosis and malignant ascites s/p pleurx drain   S/p C9 FOLFOX/Avastin 9/8/22   S/p C1D8 paclitaxel and Cyramza today  Consult oncology. BL Hydronephrosis - s/p R stent exchange but unable to exchange L due to possible tumor involvement. GERD - IV PPI, carafate     Hypomagnsemia // hypokalemia - replete     Mod to severe protein caloric malnutrition - consult RD. History of DVT - apixaban     Hyperglycemia - check glucose. A1c in AM. Low dose SSI. Anticipated discharge needs:         Diet: ADULT DIET; Clear Liquid; [facility] [place] (GERD/Peptic Ulcer)  Diet NPO Exceptions are: Ice Chips, Sips of Water with Meds, Popsicles  VTE ppx: apixaban   Code status: Full Code    Hospital Problems:  Principal Problem:    Intractable nausea and vomiting  Active Problems:    Bilateral hydronephrosis    Peritoneal carcinomatosis (HCC)    Current use of long term anticoagulation    Hypokalemia    Hypomagnesemia    Hypoalbuminemia    GERD (gastroesophageal reflux disease)    Moderate protein-calorie malnutrition (HCC)    Carcinoma of unknown primary ([facility] [address]    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  *       Past History:     Past Medical History:   Diagnosis Date    Anemia     2012-- not a problem since hyst    Colon cancer ([facility] [address] dx 2/2022     plans for chemo--- followed by dr Javy EL-19 12/2020    no hospitalization    GERD (gastroesophageal reflux disease)     managed with med    History of colonoscopy 05/2018    Dr. Marilyn Peres, nl (see media note), R 2028    Hx of blood clots 06/2022    per pt \"small clot on lung identified by CT scan\"  CT Scan impression:- Nonobstructive pulmonary filling defect involving Left Lower Lobe     Hypotension     asymptomatic    Obstruction of fallopian tube     per pt has \"1 good tube\"    Peritoneal carcinomatosis ([address]     Weight loss     80lbs weight loss after gastric sleeve       Past Surgical History:   Procedure Laterality Date     SECTION      CYSTOSCOPY Bilateral 2022    CYSTOSCOPY BILATERAL RETROGRADE PYELOGRAM performed by Audelia Tobias MD at [address] Bilateral 2022    BILATERAL CYSTOSCOPY URETERAL STENT EXCHANGE performed by Audelia Tobias MD at [address] Bilateral 10/6/2022    CYSTOSCOPY BILATERAL URETERAL STENT REMOVAL, RIGHT URETERAL STENT PLACEMENT performed by Audelia Tobias MD at [facility] MAIN OR    GASTRIC BYPASS SURGERY  2014    gastric sleeve- [place]    HYSTERECTOMY (CERVIX STATUS UNKNOWN)      2018    HYSTERECTOMY (CERVIX STATUS UNKNOWN)  2020    TLH w/ Bilateral salpingectomy and left oophorectomy    IR PERC CATH PLEURAL DRAIN W/IMAG  2022    IR PERC CATH PLEURAL DRAIN W/IMAG 2022 [facility]    IR PORT PLACEMENT EQUAL OR GREATER THAN 5 YEARS  2022    IR PORT PLACEMENT EQUAL OR GREATER THAN 5 YEARS  2022    IR PORT PLACEMENT EQUAL OR GREATER THAN 5 YEARS 2022 [facility]    LAPAROTOMY N/A 2022    EXPLORATORY LAPAROTOMY/ ENTEROENTEROSTOMY performed by Corby Barber MD at [facility]. [address]  age Sharlotte Donnell 21s\"    also \"unblocked her FT\"    TONSILLECTOMY      UPPER GASTROINTESTINAL ENDOSCOPY      with dilation    UPPER GASTROINTESTINAL ENDOSCOPY N/A 2022    EGD ESOPHAGOGASTRODUODENOSCOPY OFL 17 To IR after recovery for Para performed by Lela Brambila MD at [facility] ENDOSCOPY    UROLOGICAL SURGERY Bilateral 03/15/2022    cysto        Social History     Tobacco Use    Smoking status: Never    Smokeless tobacco: Never   Substance Use Topics    Alcohol use: Not Currently      Social History     Substance and Sexual Activity   Drug Use No       Family History   Problem Relation Age of Onset    Heart Disease Maternal Grandmother     Hypertension Maternal Grandmother     Heart Disease Maternal Grandfather     Hypertension Maternal Grandfather     Pulmonary Embolism Neg Hx     Colon Cancer Neg Hx     Deep Vein Thrombosis Neg Hx     Ovarian Cancer Neg Hx     Prostate Cancer Neg Hx     Breast Cancer Neg Hx         Immunization History   Administered Date(s) Administered    COVID-19, PFIZER PURPLE top, DILUTE for use, (age 15 y+), 30mcg/0.3mL 08/08/2021, 08/29/2021     No Known Allergies  Prior to Admit Medications:  Current Outpatient Medications   Medication Instructions    apixaban (ELIQUIS) 5 mg, Oral, 2 TIMES DAILY    docusate (COLACE, DULCOLAX) 100 mg, Oral, 2 TIMES DAILY    gabapentin (NEURONTIN) 300 mg, Oral, 3 TIMES DAILY    lidocaine-prilocaine (EMLA) 2.5-2.5 % cream Apply to port about 45 minutes prior to access    metoclopramide (REGLAN) 5 mg, Oral, DAILY    ondansetron (ZOFRAN) 8 mg, Oral, EVERY 8 HOURS PRN    pantoprazole (PROTONIX) 40 mg, Oral, 2 times daily    polyethylene glycol (GLYCOLAX) 17 g, Oral, DAILY PRN    potassium chloride (KLOR-CON M) 20 MEQ extended release tablet 20 mEq, Oral, 2 TIMES DAILY    sennosides-docusate sodium (SENOKOT-S) 8.6-50 MG tablet 2 tablets, Oral, DAILY PRN    sucralfate (CARAFATE) 1 g, Oral, 4 TIMES DAILY         Objective:   Patient Vitals for the past 24 hrs:   Temp Pulse Resp BP SpO2   10/20/22 1614 98.5 °F (36.9 °C) (!) 109 16 (!) 126/96 96 %       Oxygen Therapy  SpO2: 96 %  O2 Device: None (Room air)    Estimated body mass index is 15.79 kg/m² as calculated from the following:    Height as of this encounter: 5' 4\" (1.626 m). Weight as of this encounter: 92 lb (41.7 kg). No intake or output data in the 24 hours ending 10/20/22 2016      Physical Exam:    Blood pressure (!) 126/96, pulse (!) 109, temperature 98.5 °F (36.9 °C), temperature source Oral, resp.  rate 16, height 5' 4\" (1.626 m), weight 92 lb (41.7 kg), SpO2 96 %, not currently breastfeeding. General:    Frail, ill appearing. NAD   Head:  Normocephalic, atraumatic  Eyes:  Sclerae appear normal.  Pupils equally round. ENT:  Nares appear normal, no drainage. Moist oral mucosa with lesions or ulcerations. Neck:  No restricted ROM. Trachea midline   CV:   Tachycardic rate, regular rhythm No m/r/g. No jugular venous distension. Lungs:   CTAB. No wheezing, rhonchi, or rales. Symmetric expansion. Abdomen: Bowel sounds present. Soft, nontender, nondistended. Extremities: No cyanosis or clubbing. No edema  Skin:     Port site dressed and clean. No rashes and normal coloration. Warm and dry. Neuro:  CN II-XII grossly intact. Sensation intact. A&Ox3  Psych:  Flat affect. Withdrawn.      I have personally reviewed labs and tests showing:  Recent Labs:  Recent Results (from the past 24 hour(s))   CBC with Auto Differential    Collection Time: 10/20/22 10:37 AM   Result Value Ref Range    WBC 5.7 4.3 - 11.1 K/uL    RBC 3.72 (L) 4.05 - 5.2 M/uL    Hemoglobin 10.1 (L) 11.7 - 15.4 g/dL    Hematocrit 32.6 (L) 35.8 - 46.3 %    MCV 87.6 82.0 - 102.0 FL    MCH 27.2 26.1 - 32.9 PG    MCHC 31.0 (L) 31.4 - 35.0 g/dL    RDW 17.1 (H) 11.9 - 14.6 %    Platelets 243 804 - 749 K/uL    MPV 9.6 9.4 - 12.3 FL    nRBC 0.02 0.0 - 0.2 K/uL    Differential Type AUTOMATED      Seg Neutrophils 60 43 - 78 %    Lymphocytes 33 13 - 44 %    Monocytes 6 4.0 - 12.0 %    Eosinophils % 1 0.5 - 7.8 %    Basophils 0 0.0 - 2.0 %    Immature Granulocytes 1 0.0 - 5.0 %    Segs Absolute 3.4 1.7 - 8.2 K/UL    Absolute Lymph # 1.9 0.5 - 4.6 K/UL    Absolute Mono # 0.4 0.1 - 1.3 K/UL    Absolute Eos # 0.0 0.0 - 0.8 K/UL    Basophils Absolute 0.0 0.0 - 0.2 K/UL    Absolute Immature Granulocyte 0.0 0.0 - 0.5 K/UL   Comprehensive Metabolic Panel    Collection Time: 10/20/22 10:37 AM   Result Value Ref Range    Sodium 134 133 - 143 mmol/L    Potassium 3.6 3.5 - 5.1 mmol/L    Chloride 97 (L) 101 - 110 mmol/L    CO2 27 21 - 32 mmol/L    Anion Gap 10 2 - 11 mmol/L    Glucose 187 (H) 65 - 100 mg/dL    BUN 11 6 - 23 MG/DL    Creatinine 0.50 (L) 0.6 - 1.0 MG/DL    Est, Glom Filt Rate >60 >60 ml/min/1.73m2    Calcium 8.5 8.3 - 10.4 MG/DL    Total Bilirubin 0.4 0.2 - 1.1 MG/DL    ALT 13 12 - 65 U/L    AST 13 (L) 15 - 37 U/L    Alk Phosphatase 90 50 - 136 U/L    Total Protein 6.5 6.3 - 8.2 g/dL    Albumin 3.0 (L) 3.5 - 5.0 g/dL    Globulin 3.5 2.8 - 4.5 g/dL    Albumin/Globulin Ratio 0.9 0.4 - 1.6     CBC with Auto Differential    Collection Time: 10/20/22  4:46 PM   Result Value Ref Range    WBC 4.7 4.3 - 11.1 K/uL    RBC 3.35 (L) 4.05 - 5.2 M/uL    Hemoglobin 9.2 (L) 11.7 - 15.4 g/dL    Hematocrit 29.6 (L) 35.8 - 46.3 %    MCV 88.4 82 - 102 FL    MCH 27.5 26.1 - 32.9 PG    MCHC 31.1 (L) 31.4 - 35.0 g/dL    RDW 17.1 (H) 11.9 - 14.6 %    Platelets 248 197 - 127 K/uL    MPV 10.4 9.4 - 12.3 FL    nRBC 0.04 0.0 - 0.2 K/uL    Differential Type AUTOMATED      Seg Neutrophils 80 (H) 43 - 78 %    Lymphocytes 16 13 - 44 %    Monocytes 3 (L) 4.0 - 12.0 %    Eosinophils % 0 (L) 0.5 - 7.8 %    Basophils 0 0.0 - 2.0 %    Immature Granulocytes 1 0.0 - 5.0 %    Segs Absolute 3.8 1.7 - 8.2 K/UL    Absolute Lymph # 0.7 0.5 - 4.6 K/UL    Absolute Mono # 0.2 0.1 - 1.3 K/UL    Absolute Eos # 0.0 0.0 - 0.8 K/UL    Basophils Absolute 0.0 0.0 - 0.2 K/UL    Absolute Immature Granulocyte 0.0 0.0 - 0.5 K/UL   Comprehensive Metabolic Panel    Collection Time: 10/20/22  4:46 PM   Result Value Ref Range    Sodium 135 133 - 143 mmol/L    Potassium 3.8 3.5 - 5.1 mmol/L    Chloride 100 (L) 101 - 110 mmol/L    CO2 25 21 - 32 mmol/L    Anion Gap 10 2 - 11 mmol/L    Glucose 159 (H) 65 - 100 mg/dL    BUN 10 6 - 23 MG/DL    Creatinine 0.50 (L) 0.6 - 1.0 MG/DL    Est, Glom Filt Rate >60 >60 ml/min/1.73m2    Calcium 8.2 (L) 8.3 - 10.4 MG/DL    Total Bilirubin 0.4 0.2 - 1.1 MG/DL    ALT 13 12 - 65 U/L    AST 15 15 - 37 U/L    Alk Phosphatase 81 50 - 136 U/L Total Protein 6.3 6.3 - 8.2 g/dL    Albumin 2.9 (L) 3.5 - 5.0 g/dL    Globulin 3.4 2.8 - 4.5 g/dL    Albumin/Globulin Ratio 0.9 0.4 - 1.6     Lipase    Collection Time: 10/20/22  4:46 PM   Result Value Ref Range    Lipase 37 (L) 73 - 393 U/L   Magnesium    Collection Time: 10/20/22  4:46 PM   Result Value Ref Range    Magnesium 1.5 (L) 1.8 - 2.4 mg/dL   Troponin    Collection Time: 10/20/22  4:46 PM   Result Value Ref Range    Troponin, High Sensitivity 11.5 0 - 14 pg/mL       I have personally reviewed imaging studies showing:  CT ABDOMEN PELVIS W IV CONTRAST Additional Contrast? None    Result Date: 10/20/2022  CT OF THE ABDOMEN AND PELVIS INDICATION: Abdominal pain and nausea, history of peritoneal carcinomatosis. Multiple axial images were obtained through the abdomen and pelvis. Oral contrast was used for bowel opacification. 100mL of Isovue 370 intravenous contrast was used for better evaluation of solid organs and vascular structures. Radiation dose reduction techniques were used for this study. All CT scans performed at this facility use one or all of the following: Automated exposure control, adjustment of the mA and/or kVp according to patient's size, iterative reconstruction. COMPARISON: 10/03/2022 FINDINGS: - LUNG BASES: Small left pleural effusion. Right lung base is clear. - LIVER: Normal in size and appearance. - GALLBLADDER/BILE DUCTS: Stable mild bile duct dilatation. - PANCREAS: Normal. - SPLEEN: Small, mottled enhancement - ADRENALS: Normal. - KIDNEYS/URETERS: Right ureteral stent is in place. Decreased right hydronephrosis. Left ureteral stent has been removed. Only mild residual prominence of the left collecting system. - BLADDER: Distal end of the right ureteral stent is in the bladder. While diffuse bladder wall thickening. - REPRODUCTIVE ORGANS: Post hysterectomy. - BOWEL: Mild diffuse bowel wall thickening, both small bowel and colon. Esophagus is distended.   No bowel distention. - LYMPH NODES: No significant retroperitoneal, mesenteric, or pelvic adenopathy. - BONES: No fracture or significant bone lesion. - VASCULATURE: Normal - OTHER: Peritoneal nodularity is again noted. There is increased ascites. Percutaneous strain is present in the right abdomen. 1.  Peritoneal carcinomatosis and increased ascites. 2.  Persistent diffuse bowel wall thickening. 3.  Persistent but improved bilateral hydronephrosis. 4.  Diffuse bladder wall thickening. If there are any questions about this report, I can be reached on [employer] or at [phone]     XR CHEST 1 VIEW    Result Date: 10/20/2022  Chest X-ray INDICATION: Chest pain AP/PA view of the chest was obtained. FINDINGS: The lungs are clear. There are no infiltrates or effusions. The heart size is normal.  Vascular port is present. No acute findings in the chest       Echocardiogram:  No results found for this or any previous visit.         Orders Placed This Encounter   Medications    pantoprazole (PROTONIX) 40 mg in sodium chloride (PF) 10 mL injection    0.9 % sodium chloride bolus    HYDROmorphone HCl PF (DILAUDID) injection 1 mg    ondansetron (ZOFRAN) injection 4 mg    sodium chloride flush 0.9 % injection 10 mL    0.9 % sodium chloride bolus    iopamidol (ISOVUE-370) 76 % injection 100 mL    magnesium sulfate 2000 mg in 50 mL IVPB premix    0.9% NaCl with KCl 40 mEq infusion    sodium chloride flush 0.9 % injection 5-40 mL    sodium chloride flush 0.9 % injection 5-40 mL    0.9 % sodium chloride infusion    DISCONTD: enoxaparin Sodium (LOVENOX) injection 30 mg     Order Specific Question:   Indication of Use     Answer:   Prophylaxis-DVT/PE    OR Linked Order Group     ondansetron (ZOFRAN-ODT) disintegrating tablet 4 mg     ondansetron (ZOFRAN) injection 4 mg    OR Linked Order Group     acetaminophen (TYLENOL) tablet 650 mg     acetaminophen (TYLENOL) suppository 650 mg    polyethylene glycol (GLYCOLAX) packet 17 g DISCONTD: pantoprazole (PROTONIX) 40 mg in sodium chloride (PF) 10 mL injection    hyoscyamine (LEVSIN/SL) sublingual tablet 125 mcg    glucose chewable tablet 16 g    OR Linked Order Group     dextrose bolus 10% 125 mL     dextrose bolus 10% 250 mL    glucagon (rDNA) injection 1 mg    dextrose 10 % infusion    insulin lispro (HUMALOG) injection vial 0-4 Units    insulin lispro (HUMALOG) injection vial 0-4 Units    HYDROmorphone HCl PF (DILAUDID) injection 0.5 mg    metoclopramide (REGLAN) injection 5 mg    sucralfate (CARAFATE) tablet 1 g    apixaban (ELIQUIS) tablet 5 mg     Order Specific Question:   Indication of Use     Answer:   Treatment-DVT/PE    pantoprazole (PROTONIX) 40 mg in sodium chloride (PF) 10 mL injection         Signed:  Marissa Santos DO, DO    Part of this note may have been written by using a voice dictation software. The note has been proof read but may still contain some grammatical/other typographical errors.

## 2022-10-21 ENCOUNTER — ANESTHESIA (OUTPATIENT)
Dept: ENDOSCOPY | Age: 47
DRG: 391 | End: 2022-10-21
Payer: COMMERCIAL

## 2022-10-21 ENCOUNTER — ANESTHESIA EVENT (OUTPATIENT)
Dept: ENDOSCOPY | Age: 47
DRG: 391 | End: 2022-10-21
Payer: COMMERCIAL

## 2022-10-21 ENCOUNTER — TELEPHONE (OUTPATIENT)
Dept: ONCOLOGY | Age: 47
End: 2022-10-21

## 2022-10-21 PROBLEM — C79.9 METASTATIC CARCINOMA (HCC): Status: ACTIVE | Noted: 2022-10-21

## 2022-10-21 PROBLEM — R13.10 DYSPHAGIA: Status: ACTIVE | Noted: 2022-10-21

## 2022-10-21 LAB
ALBUMIN SERPL-MCNC: 2.9 G/DL (ref 3.5–5)
ALBUMIN/GLOB SERPL: 0.9 {RATIO} (ref 0.4–1.6)
ALP SERPL-CCNC: 76 U/L (ref 50–136)
ALT SERPL-CCNC: 11 U/L (ref 12–65)
ANION GAP SERPL CALC-SCNC: 7 MMOL/L (ref 2–11)
APPEARANCE UR: ABNORMAL
AST SERPL-CCNC: 12 U/L (ref 15–37)
BACTERIA URNS QL MICRO: ABNORMAL /HPF
BASOPHILS # BLD: 0 K/UL (ref 0–0.2)
BASOPHILS NFR BLD: 0 % (ref 0–2)
BILIRUB SERPL-MCNC: 0.3 MG/DL (ref 0.2–1.1)
BILIRUB UR QL: NEGATIVE
BUN SERPL-MCNC: 10 MG/DL (ref 6–23)
CALCIUM SERPL-MCNC: 8.1 MG/DL (ref 8.3–10.4)
CASTS URNS QL MICRO: ABNORMAL /LPF
CHLORIDE SERPL-SCNC: 104 MMOL/L (ref 101–110)
CO2 SERPL-SCNC: 27 MMOL/L (ref 21–32)
COLOR UR: ABNORMAL
CREAT SERPL-MCNC: 0.6 MG/DL (ref 0.6–1)
DIFFERENTIAL METHOD BLD: ABNORMAL
EOSINOPHIL # BLD: 0 K/UL (ref 0–0.8)
EOSINOPHIL NFR BLD: 0 % (ref 0.5–7.8)
EPI CELLS #/AREA URNS HPF: ABNORMAL /HPF
ERYTHROCYTE [DISTWIDTH] IN BLOOD BY AUTOMATED COUNT: 17.2 % (ref 11.9–14.6)
EST. AVERAGE GLUCOSE BLD GHB EST-MCNC: 117 MG/DL
GLOBULIN SER CALC-MCNC: 3.1 G/DL (ref 2.8–4.5)
GLUCOSE BLD STRIP.AUTO-MCNC: 100 MG/DL (ref 65–100)
GLUCOSE BLD STRIP.AUTO-MCNC: 91 MG/DL (ref 65–100)
GLUCOSE SERPL-MCNC: 109 MG/DL (ref 65–100)
GLUCOSE UR STRIP.AUTO-MCNC: NEGATIVE MG/DL
HBA1C MFR BLD: 5.7 % (ref 4.8–5.6)
HCT VFR BLD AUTO: 26.5 % (ref 35.8–46.3)
HGB BLD-MCNC: 8.1 G/DL (ref 11.7–15.4)
HGB UR QL STRIP: ABNORMAL
IMM GRANULOCYTES # BLD AUTO: 0.1 K/UL (ref 0–0.5)
IMM GRANULOCYTES NFR BLD AUTO: 1 % (ref 0–5)
KETONES UR QL STRIP.AUTO: ABNORMAL MG/DL
LEUKOCYTE ESTERASE UR QL STRIP.AUTO: ABNORMAL
LYMPHOCYTES # BLD: 1.8 K/UL (ref 0.5–4.6)
LYMPHOCYTES NFR BLD: 33 % (ref 13–44)
MAGNESIUM SERPL-MCNC: 2.2 MG/DL (ref 1.8–2.4)
MCH RBC QN AUTO: 27.6 PG (ref 26.1–32.9)
MCHC RBC AUTO-ENTMCNC: 30.6 G/DL (ref 31.4–35)
MCV RBC AUTO: 90.1 FL (ref 82–102)
MONOCYTES # BLD: 0.3 K/UL (ref 0.1–1.3)
MONOCYTES NFR BLD: 5 % (ref 4–12)
MUCOUS THREADS URNS QL MICRO: ABNORMAL /LPF
NEUTS SEG # BLD: 3.3 K/UL (ref 1.7–8.2)
NEUTS SEG NFR BLD: 61 % (ref 43–78)
NITRITE UR QL STRIP.AUTO: NEGATIVE
NRBC # BLD: 0.07 K/UL (ref 0–0.2)
PH UR STRIP: 5.5 [PH] (ref 5–9)
PLATELET # BLD AUTO: 271 K/UL (ref 150–450)
PMV BLD AUTO: 10.6 FL (ref 9.4–12.3)
POTASSIUM SERPL-SCNC: 4.1 MMOL/L (ref 3.5–5.1)
PROT SERPL-MCNC: 6 G/DL (ref 6.3–8.2)
PROT UR STRIP-MCNC: 300 MG/DL
RBC # BLD AUTO: 2.94 M/UL (ref 4.05–5.2)
RBC #/AREA URNS HPF: >100 /HPF
SERVICE CMNT-IMP: NORMAL
SERVICE CMNT-IMP: NORMAL
SODIUM SERPL-SCNC: 138 MMOL/L (ref 133–143)
SP GR UR REFRACTOMETRY: >1.035 (ref 1–1.02)
UROBILINOGEN UR QL STRIP.AUTO: 0.2 EU/DL (ref 0.2–1)
WBC # BLD AUTO: 5.5 K/UL (ref 4.3–11.1)
WBC URNS QL MICRO: >100 /HPF
YEAST URNS QL MICRO: ABNORMAL

## 2022-10-21 PROCEDURE — 3700000000 HC ANESTHESIA ATTENDED CARE: Performed by: INTERNAL MEDICINE

## 2022-10-21 PROCEDURE — 97112 NEUROMUSCULAR REEDUCATION: CPT

## 2022-10-21 PROCEDURE — 2709999900 HC NON-CHARGEABLE SUPPLY: Performed by: INTERNAL MEDICINE

## 2022-10-21 PROCEDURE — 1100000000 HC RM PRIVATE

## 2022-10-21 PROCEDURE — 1100000003 HC PRIVATE W/ TELEMETRY

## 2022-10-21 PROCEDURE — 2580000003 HC RX 258: Performed by: NURSE PRACTITIONER

## 2022-10-21 PROCEDURE — 97162 PT EVAL MOD COMPLEX 30 MIN: CPT

## 2022-10-21 PROCEDURE — 7100000011 HC PHASE II RECOVERY - ADDTL 15 MIN: Performed by: INTERNAL MEDICINE

## 2022-10-21 PROCEDURE — C9113 INJ PANTOPRAZOLE SODIUM, VIA: HCPCS | Performed by: FAMILY MEDICINE

## 2022-10-21 PROCEDURE — 0D718ZZ DILATION OF UPPER ESOPHAGUS, VIA NATURAL OR ARTIFICIAL OPENING ENDOSCOPIC: ICD-10-PCS | Performed by: INTERNAL MEDICINE

## 2022-10-21 PROCEDURE — 2580000003 HC RX 258: Performed by: ANESTHESIOLOGY

## 2022-10-21 PROCEDURE — 3609017700 HC EGD DILATION GASTRIC/DUODENAL STRICTURE: Performed by: INTERNAL MEDICINE

## 2022-10-21 PROCEDURE — C9113 INJ PANTOPRAZOLE SODIUM, VIA: HCPCS | Performed by: NURSE PRACTITIONER

## 2022-10-21 PROCEDURE — 97530 THERAPEUTIC ACTIVITIES: CPT

## 2022-10-21 PROCEDURE — 85025 COMPLETE CBC W/AUTO DIFF WBC: CPT

## 2022-10-21 PROCEDURE — 83036 HEMOGLOBIN GLYCOSYLATED A1C: CPT

## 2022-10-21 PROCEDURE — 6360000002 HC RX W HCPCS: Performed by: NURSE PRACTITIONER

## 2022-10-21 PROCEDURE — APPSS180 APP SPLIT SHARED TIME > 60 MINUTES: Performed by: NURSE PRACTITIONER

## 2022-10-21 PROCEDURE — C1726 CATH, BAL DIL, NON-VASCULAR: HCPCS | Performed by: INTERNAL MEDICINE

## 2022-10-21 PROCEDURE — 7100000010 HC PHASE II RECOVERY - FIRST 15 MIN: Performed by: INTERNAL MEDICINE

## 2022-10-21 PROCEDURE — A4216 STERILE WATER/SALINE, 10 ML: HCPCS | Performed by: FAMILY MEDICINE

## 2022-10-21 PROCEDURE — 82962 GLUCOSE BLOOD TEST: CPT

## 2022-10-21 PROCEDURE — 99223 1ST HOSP IP/OBS HIGH 75: CPT | Performed by: INTERNAL MEDICINE

## 2022-10-21 PROCEDURE — 80053 COMPREHEN METABOLIC PANEL: CPT

## 2022-10-21 PROCEDURE — 6360000002 HC RX W HCPCS: Performed by: FAMILY MEDICINE

## 2022-10-21 PROCEDURE — 2500000003 HC RX 250 WO HCPCS

## 2022-10-21 PROCEDURE — 36591 DRAW BLOOD OFF VENOUS DEVICE: CPT

## 2022-10-21 PROCEDURE — 6360000002 HC RX W HCPCS

## 2022-10-21 PROCEDURE — 97166 OT EVAL MOD COMPLEX 45 MIN: CPT

## 2022-10-21 PROCEDURE — A4216 STERILE WATER/SALINE, 10 ML: HCPCS | Performed by: NURSE PRACTITIONER

## 2022-10-21 PROCEDURE — 87106 FUNGI IDENTIFICATION YEAST: CPT

## 2022-10-21 PROCEDURE — 83735 ASSAY OF MAGNESIUM: CPT

## 2022-10-21 PROCEDURE — 2580000003 HC RX 258: Performed by: FAMILY MEDICINE

## 2022-10-21 PROCEDURE — 81001 URINALYSIS AUTO W/SCOPE: CPT

## 2022-10-21 PROCEDURE — 87086 URINE CULTURE/COLONY COUNT: CPT

## 2022-10-21 RX ORDER — PROPOFOL 10 MG/ML
INJECTION, EMULSION INTRAVENOUS CONTINUOUS PRN
Status: DISCONTINUED | OUTPATIENT
Start: 2022-10-21 | End: 2022-10-21 | Stop reason: SDUPTHER

## 2022-10-21 RX ORDER — PROPOFOL 10 MG/ML
INJECTION, EMULSION INTRAVENOUS PRN
Status: DISCONTINUED | OUTPATIENT
Start: 2022-10-21 | End: 2022-10-21 | Stop reason: SDUPTHER

## 2022-10-21 RX ORDER — LIDOCAINE HYDROCHLORIDE 10 MG/ML
1 INJECTION, SOLUTION INFILTRATION; PERINEURAL
Status: DISCONTINUED | OUTPATIENT
Start: 2022-10-21 | End: 2022-10-21 | Stop reason: HOSPADM

## 2022-10-21 RX ORDER — SODIUM CHLORIDE, SODIUM LACTATE, POTASSIUM CHLORIDE, CALCIUM CHLORIDE 600; 310; 30; 20 MG/100ML; MG/100ML; MG/100ML; MG/100ML
INJECTION, SOLUTION INTRAVENOUS CONTINUOUS
Status: DISCONTINUED | OUTPATIENT
Start: 2022-10-21 | End: 2022-10-22

## 2022-10-21 RX ORDER — LIDOCAINE HYDROCHLORIDE 20 MG/ML
INJECTION, SOLUTION EPIDURAL; INFILTRATION; INTRACAUDAL; PERINEURAL PRN
Status: DISCONTINUED | OUTPATIENT
Start: 2022-10-21 | End: 2022-10-21 | Stop reason: SDUPTHER

## 2022-10-21 RX ORDER — PANTOPRAZOLE SODIUM 40 MG/1
40 TABLET, DELAYED RELEASE ORAL
Status: DISCONTINUED | OUTPATIENT
Start: 2022-10-21 | End: 2022-10-21

## 2022-10-21 RX ORDER — HYDROCODONE BITARTRATE AND ACETAMINOPHEN 5; 325 MG/1; MG/1
1 TABLET ORAL EVERY 6 HOURS PRN
Status: DISCONTINUED | OUTPATIENT
Start: 2022-10-21 | End: 2022-10-26 | Stop reason: HOSPADM

## 2022-10-21 RX ADMIN — HYDROMORPHONE HYDROCHLORIDE 0.5 MG: 1 INJECTION, SOLUTION INTRAMUSCULAR; INTRAVENOUS; SUBCUTANEOUS at 03:53

## 2022-10-21 RX ADMIN — PROPOFOL 180 MCG/KG/MIN: 10 INJECTION, EMULSION INTRAVENOUS at 14:09

## 2022-10-21 RX ADMIN — SODIUM CHLORIDE, PRESERVATIVE FREE 10 ML: 5 INJECTION INTRAVENOUS at 08:23

## 2022-10-21 RX ADMIN — HYDROMORPHONE HYDROCHLORIDE 0.5 MG: 1 INJECTION, SOLUTION INTRAMUSCULAR; INTRAVENOUS; SUBCUTANEOUS at 09:03

## 2022-10-21 RX ADMIN — PROPOFOL 10 MG: 10 INJECTION, EMULSION INTRAVENOUS at 14:11

## 2022-10-21 RX ADMIN — LIDOCAINE HYDROCHLORIDE 50 MG: 20 INJECTION, SOLUTION EPIDURAL; INFILTRATION; INTRACAUDAL; PERINEURAL at 14:09

## 2022-10-21 RX ADMIN — ONDANSETRON 4 MG: 2 INJECTION INTRAMUSCULAR; INTRAVENOUS at 09:03

## 2022-10-21 RX ADMIN — SODIUM CHLORIDE, PRESERVATIVE FREE 10 ML: 5 INJECTION INTRAVENOUS at 21:39

## 2022-10-21 RX ADMIN — PANTOPRAZOLE SODIUM 40 MG: 40 INJECTION, POWDER, LYOPHILIZED, FOR SOLUTION INTRAVENOUS at 21:37

## 2022-10-21 RX ADMIN — HYDROMORPHONE HYDROCHLORIDE 0.5 MG: 1 INJECTION, SOLUTION INTRAMUSCULAR; INTRAVENOUS; SUBCUTANEOUS at 19:55

## 2022-10-21 RX ADMIN — ONDANSETRON 4 MG: 2 INJECTION INTRAMUSCULAR; INTRAVENOUS at 15:25

## 2022-10-21 RX ADMIN — PANTOPRAZOLE SODIUM 40 MG: 40 INJECTION, POWDER, LYOPHILIZED, FOR SOLUTION INTRAVENOUS at 08:49

## 2022-10-21 RX ADMIN — METOCLOPRAMIDE HYDROCHLORIDE 5 MG: 5 INJECTION INTRAMUSCULAR; INTRAVENOUS at 22:23

## 2022-10-21 RX ADMIN — PROPOFOL 50 MG: 10 INJECTION, EMULSION INTRAVENOUS at 14:09

## 2022-10-21 RX ADMIN — ONDANSETRON 4 MG: 2 INJECTION INTRAMUSCULAR; INTRAVENOUS at 21:37

## 2022-10-21 RX ADMIN — HYDROMORPHONE HYDROCHLORIDE 0.5 MG: 1 INJECTION, SOLUTION INTRAMUSCULAR; INTRAVENOUS; SUBCUTANEOUS at 15:25

## 2022-10-21 RX ADMIN — SODIUM CHLORIDE, POTASSIUM CHLORIDE, SODIUM LACTATE AND CALCIUM CHLORIDE: 600; 310; 30; 20 INJECTION, SOLUTION INTRAVENOUS at 13:32

## 2022-10-21 RX ADMIN — CEFTRIAXONE 1000 MG: 1 INJECTION, POWDER, FOR SOLUTION INTRAMUSCULAR; INTRAVENOUS at 12:03

## 2022-10-21 RX ADMIN — POTASSIUM CHLORIDE AND SODIUM CHLORIDE: 900; 300 INJECTION, SOLUTION INTRAVENOUS at 17:00

## 2022-10-21 RX ADMIN — METOCLOPRAMIDE HYDROCHLORIDE 5 MG: 5 INJECTION INTRAMUSCULAR; INTRAVENOUS at 03:48

## 2022-10-21 RX ADMIN — METOCLOPRAMIDE HYDROCHLORIDE 5 MG: 5 INJECTION INTRAMUSCULAR; INTRAVENOUS at 12:14

## 2022-10-21 ASSESSMENT — PAIN DESCRIPTION - ORIENTATION
ORIENTATION: MID
ORIENTATION: MID

## 2022-10-21 ASSESSMENT — PAIN DESCRIPTION - FREQUENCY: FREQUENCY: CONTINUOUS

## 2022-10-21 ASSESSMENT — PAIN - FUNCTIONAL ASSESSMENT
PAIN_FUNCTIONAL_ASSESSMENT: NONE - DENIES PAIN
PAIN_FUNCTIONAL_ASSESSMENT: ACTIVITIES ARE NOT PREVENTED
PAIN_FUNCTIONAL_ASSESSMENT: ACTIVITIES ARE NOT PREVENTED

## 2022-10-21 ASSESSMENT — PAIN SCALES - GENERAL
PAINLEVEL_OUTOF10: 7
PAINLEVEL_OUTOF10: 8
PAINLEVEL_OUTOF10: 4

## 2022-10-21 ASSESSMENT — PAIN DESCRIPTION - LOCATION
LOCATION: ABDOMEN
LOCATION: ABDOMEN

## 2022-10-21 ASSESSMENT — PAIN DESCRIPTION - PAIN TYPE: TYPE: ACUTE PAIN

## 2022-10-21 ASSESSMENT — PAIN DESCRIPTION - DESCRIPTORS
DESCRIPTORS: ACHING;DISCOMFORT
DESCRIPTORS: ACHING

## 2022-10-21 ASSESSMENT — PAIN DESCRIPTION - ONSET: ONSET: AWAKENED FROM SLEEP

## 2022-10-21 NOTE — ANESTHESIA POSTPROCEDURE EVALUATION
Department of Anesthesiology  Postprocedure Note    Patient: Roxanne Szymanski  MRN: [id number]  YOB: 1975  Date of evaluation: 10/21/2022      Procedure Summary     Date: 10/21/22 Room / Location: [facility] ENDO 05 / [facility] ENDOSCOPY    Anesthesia Start: 8926 Anesthesia Stop: 0240    Procedure: EGD DILATION BALLOON (Upper GI Region) Diagnosis:       Dysphagia, unspecified type      (Dysphagia, unspecified type [R13.10])    Surgeons: Ady Wren MD Responsible Provider: Roro Vieyra MD    Anesthesia Type: TIVA ASA Status: 3          Anesthesia Type: TIVA    Dianne Phase I: Dianne Score: 10    Dianne Phase II:        Anesthesia Post Evaluation    Patient location during evaluation: PACU  Patient participation: complete - patient participated  Level of consciousness: awake and alert  Airway patency: patent  Nausea & Vomiting: no nausea and no vomiting  Complications: no  Cardiovascular status: hemodynamically stable  Respiratory status: acceptable, nonlabored ventilation and spontaneous ventilation  Hydration status: euvolemic  Comments: /74   Pulse 80   Temp 98.6 °F (37 °C) (Skin)   Resp 18   Ht 5' 4\" (1.626 m)   Wt 94 lb (42.6 kg)   SpO2 98%   BMI 16.14 kg/m²     Multimodal analgesia pain management approach

## 2022-10-21 NOTE — ANESTHESIA PRE PROCEDURE
Department of Anesthesiology  Preprocedure Note       Name:  José Manuel Gasca   Age:  52 y.o.  :  1975                                          MRN:  [id number]         Date:  10/21/2022      Surgeon: Taylor Tesfaye):  Woodrow Alvarado MD    Procedure: Procedure(s):  EGD ESOPHAGOGASTRODUODENOSCOPY     Medications prior to admission:   Prior to Admission medications    Medication Sig Start Date End Date Taking? Authorizing Provider   gabapentin (NEURONTIN) 300 MG capsule Take 1 capsule by mouth 3 times daily for 15 days. Patient not taking: No sig reported 10/10/22 10/25/22  Nick Guaman MD   metoclopramide (REGLAN) 5 MG tablet Take 1 tablet by mouth daily 22   SCOTT Qureshi NP   sennosides-docusate sodium (SENOKOT-S) 8.6-50 MG tablet Take 2 tablets by mouth daily as needed for Constipation 22   SCOTT Qureshi NP   polyethylene glycol (GLYCOLAX) 17 g packet Take 17 g by mouth daily as needed for Constipation 9/26/22 10/26/22  SCOTT Qureshi NP   sucralfate (CARAFATE) 1 GM tablet Take 1 tablet by mouth 4 times daily 22   SCOTT Qureshi NP   pantoprazole (PROTONIX) 40 MG tablet Take 1 tablet by mouth in the morning and at bedtime 22   SCOTT Claros CNP   docusate sodium (COLACE, DULCOLAX) 100 MG CAPS Take 100 mg by mouth 2 times daily 22   SCOTT Corrales CNP   apixaban (ELIQUIS) 5 MG TABS tablet Take 1 tablet by mouth in the morning and 1 tablet before bedtime.  7/18/22 10/20/22  Nick Guaman MD   potassium chloride (KLOR-CON M) 20 MEQ extended release tablet Take 1 tablet by mouth 2 times daily 22   SCOTT Adams CNP   lidocaine-prilocaine (EMLA) 2.5-2.5 % cream Apply to port about 45 minutes prior to access 3/4/22   Ar Automatic Reconciliation   ondansetron (ZOFRAN) 8 MG tablet Take 8 mg by mouth every 8 hours as needed 3/4/22   Ar Automatic Reconciliation       Current medications:    No current facility-administered medications for this visit. No current outpatient medications on file.      Facility-Administered Medications Ordered in Other Visits   Medication Dose Route Frequency Provider Last Rate Last Admin    lidocaine 1 % injection 1 mL  1 mL IntraDERmal Once PRN Saba Lim MD        lactated ringers infusion   IntraVENous Continuous Saba Lim  mL/hr at 10/21/22 1332 New Bag at 10/21/22 1332    cefTRIAXone (ROCEPHIN) 1,000 mg in sodium chloride 0.9 % 50 mL IVPB mini-bag  1,000 mg IntraVENous Q24H SCOTT Zarate - CNP   Stopped at 10/21/22 1252    HYDROcodone-acetaminophen ([address]) 5-325 MG per tablet 1 tablet  1 tablet Oral Q6H PRN Jenise Montoya APRN - CNP        0.9% NaCl with KCl 40 mEq infusion   IntraVENous Continuous Mike Bright, DO 75 mL/hr at 10/20/22 2157 New Bag at 10/20/22 2157    sodium chloride flush 0.9 % injection 5-40 mL  5-40 mL IntraVENous 2 times per day Mike Bright, DO   10 mL at 10/21/22 9895    sodium chloride flush 0.9 % injection 5-40 mL  5-40 mL IntraVENous PRN Mike Bright, DO        0.9 % sodium chloride infusion   IntraVENous PRN Mike Bright, DO        ondansetron (ZOFRAN-ODT) disintegrating tablet 4 mg  4 mg Oral Q8H PRN Mike Bright, DO        Or    ondansetron [facility]) injection 4 mg  4 mg IntraVENous Q6H PRN Mike Bright, DO   4 mg at 10/21/22 2829    acetaminophen (TYLENOL) tablet 650 mg  650 mg Oral Q6H PRN Mike Bright, DO        Or    acetaminophen (TYLENOL) suppository 650 mg  650 mg Rectal Q6H PRN Mike Bright, DO        polyethylene glycol (GLYCOLAX) packet 17 g  17 g Oral Daily PRN Mike Bright, DO        hyoscyamine (LEVSIN/SL) sublingual tablet 125 mcg  125 mcg SubLINGual Q4H PRN Mike Bright, DO        glucose chewable tablet 16 g  4 tablet Oral PRN Mike Bright, DO        dextrose bolus 10% 125 mL  125 mL IntraVENous PRN Mike Bright, DO        Or    dextrose bolus 10% 250 mL  250 mL IntraVENous PRN Mike Bright, DO  glucagon (rDNA) injection 1 mg  1 mg SubCUTAneous PRN Hyacinth Mikes, DO        dextrose 10 % infusion   IntraVENous Continuous PRN Hyacinth Mikes, DO        HYDROmorphone HCl PF (DILAUDID) injection 0.5 mg  0.5 mg IntraVENous Q4H PRN Hyacinth Mikes, DO   0.5 mg at 10/21/22 6006    metoclopramide (REGLAN) injection 5 mg  5 mg IntraVENous q8h Hyacinth Mikes, DO   5 mg at 10/21/22 1214    [Held by provider] apixaban (ELIQUIS) tablet 5 mg  5 mg Oral BID Hyacinth Mikes, DO        pantoprazole (PROTONIX) 40 mg in sodium chloride (PF) 10 mL injection  40 mg IntraVENous Q12H Hyacinth Mikes, DO   40 mg at 10/21/22 0343       Allergies:  No Known Allergies    Problem List:    Patient Active Problem List   Diagnosis Code    Fibroids D21.9    Paraesophageal hernia K44.9    Menorrhagia with regular cycle N92.0    Papanicolaou smear of cervix with low grade squamous intraepithelial lesion (LGSIL) R87.612    Iron deficiency anemia due to chronic blood loss D50.0    Anemia D64.9    History of weight loss surgery Z98.84    Carcinoma of unknown primary ([facility] [address] C80.1    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 (HCC) C78.6, C80.1    Bilateral hydronephrosis N13.30    Peritoneal carcinoma (HCC) C48.2    Peritoneal carcinomatosis (HCC) C78.6    Hematemesis K92.0    Current use of long term anticoagulation Z79.01    Intractable vomiting R11.10    Generalized abdominal pain R10.84    LFT elevation R79.89    Obstruction of both ureters N13.5    Abdominal pain R10.9    Hypokalemia E87.6    Hypomagnesemia E83.42    Hypoalbuminemia E88.09    GERD (gastroesophageal reflux disease) K21.9    Colitis K52.9    Intractable vomiting with nausea R11.2    Fever R50.9    CINV (chemotherapy-induced nausea and vomiting) R11.2, T45.1X5A    Intractable nausea and vomiting R11.2    Moderate protein-calorie malnutrition (HCC) E44.0       Past Medical History:        Diagnosis Date    Anemia     2012-- not a problem since hyst    Colon cancer ([facility] [address] dx 2022     plans for chemo--- followed by dr Kee mE [phone]    no hospitalization    GERD (gastroesophageal reflux disease)     managed with med    History of colonoscopy 2018    Dr. Susannah Closs, nl (see media note), R     Hx of blood clots 2022    per pt \"small clot on lung identified by CT scan\"  CT Scan impression:- Nonobstructive pulmonary filling defect involving Left Lower Lobe     Hypotension     asymptomatic    Obstruction of fallopian tube     per pt has \"1 good tube\"    Peritoneal carcinomatosis ([facility] [address]     Weight loss     80lbs weight loss after gastric sleeve       Past Surgical History:        Procedure Laterality Date     SECTION      CYSTOSCOPY Bilateral 2022    CYSTOSCOPY BILATERAL RETROGRADE PYELOGRAM performed by Abiodun Alfaro MD at [address] Bilateral 2022    BILATERAL CYSTOSCOPY URETERAL STENT EXCHANGE performed by Abiodun Alfaro MD at [address] Bilateral 10/6/2022    CYSTOSCOPY BILATERAL URETERAL STENT REMOVAL, RIGHT URETERAL [address] [place] [facility] [place] Abiodun Alfaro MD at [address] [place] [address] GASTRIC BYPASS SURGERY  2014    gastric sleeve- Choudhari    HYSTERECTOMY (CERVIX STATUS UNKNOWN)      2018    HYSTERECTOMY (CERVIX STATUS UNKNOWN)  2020    TLH w/ Bilateral salpingectomy and left oophorectomy    IR PERC CATH PLEURAL DRAIN W/IMAG  2022    IR PERC CATH PLEURAL DRAIN W/IMAG 2022 [facility]    IR PORT PLACEMENT EQUAL OR GREATER THAN 5 YEARS  2022    IR PORT PLACEMENT EQUAL OR GREATER THAN 5 YEARS  2022    IR PORT PLACEMENT EQUAL OR GREATER THAN 5 YEARS 2022 [facility]    LAPAROTOMY N/A 2022    EXPLORATORY LAPAROTOMY/ ENTEROENTEROSTOMY performed by Luis Alberto Mishra MD at [facility] MAIN OR    MYOMECTOMY  age Samanta Plan 21s\"    also \"unblocked her FT\"    TONSILLECTOMY      UPPER GASTROINTESTINAL ENDOSCOPY      with dilation  UPPER GASTROINTESTINAL ENDOSCOPY N/A 9/13/2022    EGD ESOPHAGOGASTRODUODENOSCOPY OFL 17 To IR after recovery for Para performed by Liyah Jones MD at [address] [place] [address] Bilateral 03/15/2022    cysto       Social History:    Social History     Tobacco Use    Smoking status: Never    Smokeless tobacco: Never   Substance Use Topics    Alcohol use: Not Currently                                Counseling given: Not Answered      Vital Signs (Current): There were no vitals filed for this visit.                                            BP Readings from Last 3 Encounters:   10/21/22 107/71   10/20/22 107/87   10/15/22 125/88       NPO Status:                                                                                 BMI:   Wt Readings from Last 3 Encounters:   10/21/22 94 lb (42.6 kg)   10/20/22 92 lb 1.6 oz (41.8 kg)   10/13/22 105 lb 8 oz (47.9 kg)     There is no height or weight on file to calculate BMI.    CBC:   Lab Results   Component Value Date/Time    WBC 5.5 10/21/2022 03:24 AM    RBC 2.94 10/21/2022 03:24 AM    HGB 8.1 10/21/2022 03:24 AM    HCT 26.5 10/21/2022 03:24 AM    MCV 90.1 10/21/2022 03:24 AM    RDW 17.2 10/21/2022 03:24 AM     10/21/2022 03:24 AM       CMP:   Lab Results   Component Value Date/Time     10/21/2022 03:24 AM    K 4.1 10/21/2022 03:24 AM     10/21/2022 03:24 AM    CO2 27 10/21/2022 03:24 AM    BUN 10 10/21/2022 03:24 AM    CREATININE 0.60 10/21/2022 03:24 AM    GFRAA >60 09/27/2022 02:37 AM    AGRATIO 1.1 05/12/2022 12:43 PM    LABGLOM >60 10/21/2022 03:24 AM    GLUCOSE 109 10/21/2022 03:24 AM    PROT 6.0 10/21/2022 03:24 AM    CALCIUM 8.1 10/21/2022 03:24 AM    BILITOT 0.3 10/21/2022 03:24 AM    ALKPHOS 76 10/21/2022 03:24 AM    ALKPHOS 77 05/12/2022 12:43 PM    AST 12 10/21/2022 03:24 AM    ALT 11 10/21/2022 03:24 AM       POC Tests:   Recent Labs     10/21/22  1135   POCGLU 91       Coags:   Lab Results   Component Value Date/Time    PROTIME 17.5 09/13/2022 05:56 AM    INR 1.4 09/13/2022 05:56 AM    APTT 23.0 08/10/2022 10:39 AM       HCG (If Applicable): No results found for: PREGTESTUR, PREGSERUM, HCG, HCGQUANT     ABGs: No results found for: PHART, PO2ART, SET3MGZ, OKG0JKC, BEART, A0WCQSBU     Type & Screen (If Applicable):  No results found for: LABABO, LABRH    Drug/Infectious Status (If Applicable):  No results found for: HIV, HEPCAB    COVID-19 Screening (If Applicable):   Lab Results   Component Value Date/Time    COVID19 Not Detected 02/11/2022 07:14 AM    COVID19 Performed 02/11/2022 07:14 AM           Anesthesia Evaluation  Patient summary reviewed and Nursing notes reviewed no history of anesthetic complications:   Airway: Mallampati: II  TM distance: >3 FB   Neck ROM: full  Mouth opening: > = 3 FB   Dental: normal exam     Comment: Fillings    Pulmonary:normal exam                               Cardiovascular:  Exercise tolerance: good (>4 METS),                  ROS comment: Hx of PE on eliquis    PE comment: Tachycardic   Neuro/Psych:   Negative Neuro/Psych ROS              GI/Hepatic/Renal:   (+) GERD:,          ROS comment: Bilateral hydronephrosis with ureteral stents    Abdominal pleurex for ascites    Denies recent nausea. Last emesis over 2 days ago. Endo/Other:    (+) blood dyscrasia: anemia:., electrolyte abnormalities (hypokalemia), malignancy/cancer (colon cancer with peritoneal carcinomatosis and ascites ). Abdominal:             Vascular:   + PE. Other Findings:             Anesthesia Plan      TIVA     ASA 3       Induction: intravenous. Anesthetic plan and risks discussed with patient.                         Kimberly Benito MD   10/21/2022

## 2022-10-21 NOTE — PROGRESS NOTES
Nutrition F/U:  Assessment:  Pt seen during office visit w/ NP, receiving Taxol today, s/p Cyramza + Taxol last week. Pt c/o abdominal pain/discomfort - not taking any pain medications, reports dysphagia and regurgitation of most intake - can tolerate thin liquids, and hummus, drinking 1 Ensure Clear per day. Pt is dehydrated today likely from volume depletion and inadequate intake, tachy with HR in the 130's, 30# wt loss x 3 weeks noted - pulling ~300 ml from abdominal pleurx drain every 3 days now, decreased since chemo last week. Pt has hx of gastric bypass, esophageal strictures, and cancer coating internal organs - not a good candidate for gastric or jejunal feeding tube placement, likely needs TPN for nutrition support if she continues to pursue chemotherapy; discussed w/ Dr. Aileen Leonard today after clinic. Current BW: 92#, down 30# over the past 3 weeks, pt cachetic in appearance - muscle wasting noted. Intervention:  1. Continue w/ liquid diet as tolerated, try to increase Ensure Clear to 3-4 per day if able. 2. Urgent referral made to GI (Dr. Myron Linares) - pt established with him, may have recurrent esophageal stricture although a poor candidate for EGD currently. 3. Pt is not a good candidate for G-tube placement given hx of gastric bypass, and J-tube due to disease process. Pt would benefit from TPN for nutrition support if she is going to continue w/ chemotherapy - discussed w/ Dr. Aileen Leonard; plan to re-evaluate tomorrow (10/21). 4. Reglan (5 mg) increased to TID from once daily    Monitoring/Evaluation:  1. RD to follow up during next office visit - follow up wt status, tolerance/intake of po diet, symptom management, further POC. [address] [facility], [address], [address]  [phone]    Addendum (10/21):   Pt admitted to [address] [place] [address] 5th floor, Dr. Aileen Leonard is rounding physician.

## 2022-10-21 NOTE — ED NOTES
TRANSFER - OUT REPORT:    Verbal report given to Derrick Diaz on [employer]  being transferred to [facility] for routine progression of patient care       Report consisted of patient's Situation, Background, Assessment and   Recommendations(SBAR). Information from the following report(s) ED Encounter Summary was reviewed with the receiving nurse. Lines:   Single Lumen Implantable Port Right Subclavian (Active)        Opportunity for questions and clarification was provided.       Patient transported with:  Eliza Solis RN  10/20/22 4899

## 2022-10-21 NOTE — PROGRESS NOTES
ACUTE PHYSICAL THERAPY GOALS:   (Developed with and agreed upon by patient and/or caregiver.)    (1.)Ms. Kim Tompkins will tolerate a minimum of 25 minutes of therapeutic activity/exercise in order to demonstrate improved activity tolerance within 7 treatment days. (2.)Ms. Kim Tompkins will transfer from bed to chair and chair to bed with INDEPENDENCE using the least restrictive device within 5 treatment day(s). (3.)Ms. Kim Tompkins will ambulate with INDEPENDENCE for a minimum of 500 feet with the least restrictive device within 5 treatment day(s). ________________________________________________________________________________________________     PHYSICAL THERAPY Initial Assessment and Daily Note  (Link to Caseload Tracking: PT Visit Days : 1  Acknowledge Orders  Time In/Out  PT Charge Capture  Rehab Caseload Tracker    Sara Salazar is a 52 y.o. female   PRIMARY DIAGNOSIS: Intractable nausea and vomiting  Chronic abdominal pain [R10.9, G89.29]  Metastatic carcinoma (HCC) [C79.9]  Intractable nausea and vomiting [R11.2]  Dysphagia, unspecified type [R13.10]  Procedure(s) (LRB):  EGD ESOPHAGOGASTRODUODENOSCOPY  (N/A)     Reason for Referral: Generalized Muscle Weakness (M62.81)  Difficulty in walking, Not elsewhere classified (R26.2)  Other abnormalities of gait and mobility (R26.89)  Inpatient: Payor: Anastasiya Barnes / Plan: Sue Hankins / Product Type: *No Product type* /     ASSESSMENT:     REHAB RECOMMENDATIONS:   Recommendation to date pending progress:  Setting:  Home Health Therapy    Equipment:    To Be Determined     ASSESSMENT:  Ms. Kim Tompkins presents with N/V and new difficulty swallowing. Her PMH includes peritoneal carcinomatosis and a gastric sleeve procedure. Her living situation has not changed but she is now using a [facility] for ambulation and needs assistance with bathing. Pt continues to be able to perform bed mobility with supervision and stands with SBA and VC for caution with dizziness.  She is able to ambulate today with CGA for 150' with use of the IV pole. Her activity tolerance is decreased as well as her generalized strength. She would continue to benefit from skilled acute care PT to facilitate improvements in the above stated deficits and promote return to baseline. HHPT recommended upon DC. [address] [facility] [address] [address] [facility] 6 Clicks Basic Mobility Inpatient Short Form  AM-PAC Mobility Inpatient   How much difficulty turning over in bed?: None  How much difficulty sitting down on / standing up from a chair with arms?: None  How much difficulty moving from lying on back to sitting on side of bed?: None  How much help from another person moving to and from a bed to a chair?: A Little  How much help from another person needed to walk in hospital room?: A Little  How much help from another person for climbing 3-5 steps with a railing?: A Little  AM-[facility] Inpatient Mobility Raw Score : 21  AM-PAC Inpatient T-Scale Score : 50.25  Mobility Inpatient CMS 0-100% Score: 28.97  Mobility Inpatient CMS G-Code Modifier : CJ    SUBJECTIVE:   Ms. Brunilda Quiroz states, \"How did I do? \"     Social/Functional Lives With: Family  Type of Home: House  Home Layout: One level  Home Access: Level entry  Bathroom Shower/Tub: Shower chair with back  Home Equipment: [employer] Help From: Family  ADL Assistance: Needs assistance  Bath: Moderate assistance  Dressing: Independent  Grooming: Independent  Feeding: Independent  Homemaking Assistance: Independent  Ambulation Assistance: Independent  Transfer Assistance: Independent  Active : Yes  Mode of Transportation: Car  Occupation: Works at home  Type of Occupation: IVC    OBJECTIVE:     PAIN: VITALS / O2: PRECAUTION / Latanya Nailer / DRAINS:   Pre Treatment:   Pain Assessment: None - Denies Pain      Post Treatment: 010 Vitals        Oxygen      IV    RESTRICTIONS/PRECAUTIONS:                    GROSS EVALUATION: Intact Impaired (Comments):   AROM [x]     PROM []    Strength []  Generalized weakness with mobility    Balance [] Sitting - Static: Good  Sitting - Dynamic: Good  Standing - Static: Fair, +  Standing - Dynamic: Fair   Posture [] Forward Head  Rounded Shoulders   Sensation []  Neuropathy in hands and bottoms of feet   Coordination []      Tone []     Edema []    Activity Tolerance [] Patient limited by fatigue    []      COGNITION/  PERCEPTION: Intact Impaired (Comments):   Orientation [x]     Vision [x]     Hearing [x]     Cognition  [x]       MOBILITY: I Mod I S SBA CGA Min Mod Max Total  NT x2 Comments:   Bed Mobility    Rolling [] [] [x] [] [] [] [] [] [] [] []    Supine to Sit [] [] [x] [] [] [] [] [] [] [] []    Scooting [] [] [] [] [] [] [] [] [] [x] []    Sit to Supine [] [] [x] [] [] [] [] [] [] [] []    Transfers    Sit to Stand [] [] [] [x] [] [] [] [] [] [] []    Bed to Chair [] [] [] [] [] [] [] [] [] [x] []    Stand to Sit [] [] [] [x] [] [] [] [] [] [] []     [] [] [] [] [] [] [] [] [] [] []    I=Independent, Mod I=Modified Independent, S=Supervision, SBA=Standby Assistance, CGA=Contact Guard Assistance,   Min=Minimal Assistance, Mod=Moderate Assistance, Max=Maximal Assistance, Total=Total Assistance, NT=Not Tested    GAIT: I Mod I S SBA CGA Min Mod Max Total  NT x2 Comments:   Level of Assistance [] [] [] [] [x] [] [] [] [] [] []    Distance 150 feet    DME IV pole    Gait Quality Decreased mason , Decreased step clearance, Decreased step length, Shuffling , Step-to, and Trunk sway increased    Weightbearing Status      Stairs      I=Independent, Mod I=Modified Independent, S=Supervision, SBA=Standby Assistance, CGA=Contact Guard Assistance,   Min=Minimal Assistance, Mod=Moderate Assistance, Max=Maximal Assistance, Total=Total Assistance, NT=Not Tested    PLAN:   FREQUENCY AND DURATION: 3 times/week for duration of hospital stay or until stated goals are met, whichever comes first.    THERAPY PROGNOSIS: Fair    PROBLEM LIST:   (Skilled intervention is medically necessary to address:)  Decreased Activity Tolerance  Decreased Balance  Decreased Coordination  Decreased Gait Ability  Decreased Strength  Decreased Transfer Abilities INTERVENTIONS PLANNED:   (Benefits and precautions of physical therapy have been discussed with the patient.)  Therapeutic Activity  Therapeutic Exercise/HEP  Neuromuscular Re-education  Gait Training       TREATMENT:   EVALUATION: MODERATE COMPLEXITY: (Untimed Charge)    TREATMENT:   Therapeutic Activity (8 Minutes): Therapeutic activity included Rolling, Supine to Sit, Sit to Supine, Transfer Training, Ambulation on level ground, Sitting balance , and Standing balance to improve functional Activity tolerance, Balance, Coordination, and Mobility.     TREATMENT GRID:  N/A    AFTER TREATMENT PRECAUTIONS: Bed, Bed/Chair Locked, Call light within reach, and Needs within reach    INTERDISCIPLINARY COLLABORATION:  RN/ PCT, PT/ PTA, and OT/ DURAN    EDUCATION: Education Given To: Patient  Education Provided: Role of Therapy;Plan of Care  Education Method: Verbal  Barriers to Learning: None  Education Outcome: Verbalized understanding    TIME IN/OUT:  Time In: 0916  Time Out: 5481  Minutes: [address], PT

## 2022-10-21 NOTE — PROGRESS NOTES
Received letter from [facility] regarding [employer] not medically necessary.   Uploaded letter to media, notified clinical team.

## 2022-10-21 NOTE — PROGRESS NOTES
TRANSFER - IN REPORT:    Verbal report received from [address], [facility] on [employer]  being received from ED for routine progression of patient care      Report consisted of patients Situation, Background, Assessment and   Recommendations(SBAR). Information from the following report(s) Nurse Handoff Report was reviewed with the receiving nurse. Opportunity for questions and clarification was provided.       Assessment completed upon patients arrival to unit and care assume

## 2022-10-21 NOTE — TELEPHONE ENCOUNTER
Called dept of insurance at [phone] to confirm expedited appeal fax number, more Farrell Juana [phone], faxed appeal letter.

## 2022-10-21 NOTE — ACP (ADVANCE CARE PLANNING)
Advance Care Planning   Healthcare Decision Maker:    Primary Decision Maker: Mercedes Davin - [phone]    Click here to complete Healthcare Decision Makers including selection of the Healthcare Decision Maker Relationship (ie \"Primary\"). Today we documented Decision Maker(s) consistent with Legal Next of Kin hierarchy. If the relationship to the patient does NOT follow our state's Next of Kin hierarchy, the patient MUST complete an ACP Document to allow him/her to act on the patient's behalf. :  No change in ACP information since last hospitalization.

## 2022-10-21 NOTE — CONSULTS
[facility] Consult Note           Referring Physician:     Consult Date: 10/20/2022    Reason for Consult: Dysphagia    History of Present Illness:  Patient is a 52 y.o. female with peritoneal carcinomatosis of unknown primary site but maybe from upper GI tract on chemo and also with gastric sleeve who is seen in consultation for dysphagia. Food and liquid stops in epigastric area. She then has to bring it back up and spit it out. Had prior esophageal strictures requiring dilations. EGD 1 mo ago just showed small Mallor Zamorano tear and gastric sleeve. Has ascites with pleurex drain in that is draining fine. Unable to tolerate PO.  LFT's and lipase normal today. CT in ER shows:  Peritoneal carcinomatosis and increased ascites. 2.  Persistent diffuse bowel wall thickening.            Past Medical History:   Diagnosis Date    Anemia     -- not a problem since hyst    Colon cancer ([address] dx 2022     plans for chemo--- followed by dr Alphonse EL-19 2020    no hospitalization    GERD (gastroesophageal reflux disease)     managed with med    History of colonoscopy 2018    Dr. Merceda Scheuermann, nl (see media note), R     Hx of blood clots 2022    per pt \"small clot on lung identified by CT scan\"  CT Scan impression:- Nonobstructive pulmonary filling defect involving Left Lower Lobe     Hypotension     asymptomatic    Obstruction of fallopian tube     per pt has \"1 good tube\"    Peritoneal carcinomatosis ([address]     Weight loss     80lbs weight loss after gastric sleeve      Past Surgical History:   Procedure Laterality Date     SECTION  2009    CYSTOSCOPY Bilateral 2022    CYSTOSCOPY BILATERAL RETROGRADE PYELOGRAM performed by Mariaelena Alexis MD at [address] Bilateral 2022    BILATERAL CYSTOSCOPY URETERAL STENT EXCHANGE performed by Mariaelena Alexis MD at [address] Bilateral 10/6/2022    CYSTOSCOPY BILATERAL URETERAL STENT REMOVAL, RIGHT URETERAL STENT PLACEMENT performed by Xiao Bush MD at [facility] MAIN OR    GASTRIC BYPASS SURGERY  07/23/2014    gastric sleeve- Choudhari    HYSTERECTOMY (CERVIX STATUS UNKNOWN)      2018    HYSTERECTOMY (CERVIX STATUS UNKNOWN)  07/09/2020    TLH w/ Bilateral salpingectomy and left oophorectomy    IR PERC CATH PLEURAL DRAIN W/IMAG  9/26/2022    IR PERC CATH PLEURAL DRAIN W/IMAG 9/26/2022 [facility]    IR PORT PLACEMENT EQUAL OR GREATER THAN 5 YEARS  2/28/2022    IR PORT PLACEMENT EQUAL OR GREATER THAN 5 YEARS  2/28/2022    IR PORT PLACEMENT EQUAL OR GREATER THAN 5 YEARS 2/28/2022 [facility]    LAPAROTOMY N/A 8/17/2022    EXPLORATORY LAPAROTOMY/ ENTEROENTEROSTOMY performed by Jesse Lange MD at [facility]. [address]  age Nohemy Jansky 21s\"    also \"unblocked her FT\"    TONSILLECTOMY      UPPER GASTROINTESTINAL ENDOSCOPY      with dilation    UPPER GASTROINTESTINAL ENDOSCOPY N/A 9/13/2022    EGD ESOPHAGOGASTRODUODENOSCOPY OFL 17 To IR after recovery for Para performed by Krishna Davis MD at [facility] ENDOSCOPY    UROLOGICAL SURGERY Bilateral 03/15/2022    cysto      Family History   Problem Relation Age of Onset    Heart Disease Maternal Grandmother     Hypertension Maternal Grandmother     Heart Disease Maternal Grandfather     Hypertension Maternal Grandfather     Pulmonary Embolism Neg Hx     Colon Cancer Neg Hx     Deep Vein Thrombosis Neg Hx     Ovarian Cancer Neg Hx     Prostate Cancer Neg Hx     Breast Cancer Neg Hx      Social History     Occupational History    Not on file   Tobacco Use    Smoking status: Never    Smokeless tobacco: Never   Vaping Use    Vaping Use: Never used   Substance and Sexual Activity    Alcohol use: Not Currently    Drug use: No    Sexual activity: Not on file       Hospital Medications:  Current Facility-Administered Medications   Medication Dose Route Frequency    0.9% NaCl with KCl 40 mEq infusion   IntraVENous Continuous    sodium chloride flush 0.9 % injection 5-40 mL  5-40 mL IntraVENous 2 times per day    sodium chloride flush 0.9 % injection 5-40 mL  5-40 mL IntraVENous PRN    0.9 % sodium chloride infusion   IntraVENous PRN    ondansetron (ZOFRAN-ODT) disintegrating tablet 4 mg  4 mg Oral Q8H PRN    Or    ondansetron (ZOFRAN) injection 4 mg  4 mg IntraVENous Q6H PRN    acetaminophen (TYLENOL) tablet 650 mg  650 mg Oral Q6H PRN    Or    acetaminophen (TYLENOL) suppository 650 mg  650 mg Rectal Q6H PRN    polyethylene glycol (GLYCOLAX) packet 17 g  17 g Oral Daily PRN    hyoscyamine (LEVSIN/SL) sublingual tablet 125 mcg  125 mcg SubLINGual Q4H PRN    glucose chewable tablet 16 g  4 tablet Oral PRN    dextrose bolus 10% 125 mL  125 mL IntraVENous PRN    Or    dextrose bolus 10% 250 mL  250 mL IntraVENous PRN    glucagon (rDNA) injection 1 mg  1 mg SubCUTAneous PRN    dextrose 10 % infusion   IntraVENous Continuous PRN    insulin lispro (HUMALOG) injection vial 0-4 Units  0-4 Units SubCUTAneous TID WC    insulin lispro (HUMALOG) injection vial 0-4 Units  0-4 Units SubCUTAneous Nightly    HYDROmorphone HCl PF (DILAUDID) injection 0.5 mg  0.5 mg IntraVENous Q4H PRN    metoclopramide (REGLAN) injection 5 mg  5 mg IntraVENous q8h    sucralfate (CARAFATE) tablet 1 g  1 g Oral 4 times per day    [Held by provider] apixaban (ELIQUIS) tablet 5 mg  5 mg Oral BID    pantoprazole (PROTONIX) 40 mg in sodium chloride (PF) 10 mL injection  40 mg IntraVENous Q12H     Current Outpatient Medications   Medication Sig    gabapentin (NEURONTIN) 300 MG capsule Take 1 capsule by mouth 3 times daily for 15 days.  (Patient not taking: No sig reported)    metoclopramide (REGLAN) 5 MG tablet Take 1 tablet by mouth daily    sennosides-docusate sodium (SENOKOT-S) 8.6-50 MG tablet Take 2 tablets by mouth daily as needed for Constipation    polyethylene glycol (GLYCOLAX) 17 g packet Take 17 g by mouth daily as needed for Constipation    sucralfate (CARAFATE) 1 GM tablet Take 1 tablet by mouth 4 times daily    pantoprazole (PROTONIX) 40 MG tablet Take 1 tablet by mouth in the morning and at bedtime    docusate sodium (COLACE, DULCOLAX) 100 MG CAPS Take 100 mg by mouth 2 times daily    apixaban (ELIQUIS) 5 MG TABS tablet Take 1 tablet by mouth in the morning and 1 tablet before bedtime. potassium chloride (KLOR-CON M) 20 MEQ extended release tablet Take 1 tablet by mouth 2 times daily    lidocaine-prilocaine (EMLA) 2.5-2.5 % cream Apply to port about 45 minutes prior to access    ondansetron (ZOFRAN) 8 MG tablet Take 8 mg by mouth every 8 hours as needed     Facility-Administered Medications Ordered in Other Encounters   Medication Dose Route Frequency    sodium chloride flush 0.9 % injection 10 mL  10 mL IntraVENous PRN    0.9 % sodium chloride infusion   IntraVENous Continuous    0.9 % sodium chloride infusion  5-250 mL/hr IntraVENous PRN    sodium chloride flush 0.9 % injection 5-40 mL  5-40 mL IntraVENous PRN       Allergies:  No Known Allergies    Review of Systems:  A comprehensive review of systems was negative except for that written in the History of Present Illness. Objective:     Physical Exam:  Vitals:  Visit Vitals  BP (!) 126/96   Pulse (!) 109   Temp 98.5 °F (36.9 °C) (Oral)   Resp 16   Ht 5' 4\" (1.626 m)   Wt 92 lb (41.7 kg)   SpO2 96%   BMI 15.79 kg/m²       General: No acute distress. Skin:  Extremities and face reveal no rashes. No hughes erythema. No telangiectasias on the chest wall. HEENT: Sclerae anicteric. No oral ulcers. No abnormal pigmentation of the lips. The neck is supple. Cardiovascular: Regular rate and rhythm. No murmurs, gallops, or rubs. Respiratory:  Comfortable breathing  With no accessory muscle use. Clear breath sounds with no wheezes, rales, or rhonchi. GI:  Abdomen nondistended, soft, and nontender. Normal active bowel sounds. No enlargement of the liver or spleen. No masses palpable. Musculoskeletal:  No pitting edema of the lower legs.   Extremities have good range of motion. Neurological:  Gross memory appears intact. Patient is alert and oriented. Psychiatric:  Mood appears appropriate with judgement intact. Lymphatic:  No cervical or supraclavicular adenopathy. Laboratory:    Recent Labs     10/20/22  1646   WBC 4.7   RBC 3.35*   HGB 9.2*   HCT 29.6*         Recent Labs     10/20/22  1646      K 3.8   *   CO2 25   BUN 10     No results for input(s): INR, APTT in the last 72 hours. Invalid input(s): PTP  No results for input(s): ALB, TP, AML in the last 72 hours. Invalid input(s): TBIL, CBIL, SGOT, GPT, AP, LPSE    Assessment:       A 52 y.o. female with peritoneal carcinomatosis of unknown primary site but maybe from upper GI tract on chemo and also with gastric sleeve who is seen in consultation for dysphagia. Plan:       EGD tomorrow, likely with esophageal dilation  NPO after midnight  Eliquis on hold      Signed By: Chalo Mcmanus MD     October 20, 2022

## 2022-10-21 NOTE — TELEPHONE ENCOUNTER
Per Omid Nogueira, rep from consumer services division sc dept of insurance stated she received my expedited appeal and needed to be faxed to [facility], was told call [phone]. Talked to [address] [place] [address] , stated to appeal will need to be processed thnrough plan and not AIM. On denial letter stated to call phone number on back of member id card. Called [facility] member services [phone], talked to Kera, insisting there is no fax number for an expedited appeal insisting this needs to be mailed. Advised I was told the same  before with previous patient and was able to get a fax number, asked for supervisor, was put on hold. Someone answered then call got disconnected.

## 2022-10-21 NOTE — PROGRESS NOTES
ACUTE OCCUPATIONAL THERAPY GOALS:   (Developed with and agreed upon by patient and/or caregiver.)  1. Patient will complete lower body bathing and dressing with MINIMAL ASSIST and adaptive equipment as needed. 2. Patient will complete toileting with INDEPENDENCE . 3. Patient will tolerate 25 minutes of OT treatment with 1-2 rest breaks to increase activity tolerance for ADLs. 4. Patient will complete functional transfers with MODIFIED INDEPENDENCE  and adaptive equipment as needed. 5. Patient will complete functional ADL task standing at sink with MODIFIED INDEPENDENCE  and adaptive equipment as needed. Timeframe: 7 visits       OCCUPATIONAL THERAPY Initial Assessment and Daily Note       OT Visit Days: 1  Acknowledge Orders  Time  OT Charge Capture  Rehab Caseload Tracker      Claudeen Penton is a 52 y.o. female   PRIMARY DIAGNOSIS: Intractable nausea and vomiting  Chronic abdominal pain [R10.9, G89.29]  Metastatic carcinoma (HCC) [C79.9]  Intractable nausea and vomiting [R11.2]  Dysphagia, unspecified type [R13.10]  Procedure(s) (LRB):  EGD ESOPHAGOGASTRODUODENOSCOPY  (N/A)     Reason for Referral: Generalized Muscle Weakness (M62.81)  Difficulty in walking, Not elsewhere classified (R26.2)  Other abnormalities of gait and mobility (R26.89)  Inpatient: Payor: Kalli Berrios 150 / Plan: Mary Alice Baum / Product Type: *No Product type* /     ASSESSMENT:     REHAB RECOMMENDATIONS:   Recommendation to date pending progress:  Setting:  Home Health Therapy    Equipment:    To Be Determined     ASSESSMENT:  Ms. Margot Mcpherson is a 53 y/o female who presented this admission for abdominal pain and vomiting. She has metastatic carcinoma and is currently undergoing chemo. At baseline pt is independent with most ADLs, however has lately been receiving assistance with bathing. She also has began using a cane with ambulating. This date, SBA for functional mobility throughout aggarwal while pushing IV pole.  Declined further treatment today. Today pt presents with decreased activity tolerance, balance, and strength impacting ADLs. Pt is currently functioning below baseline and would benefit from skilled OT services to address OT goals and plan of care. Rec HHOT at d/c.         [address] [address] [facility] 6 Clicks Daily Activity Inpatient Short Form:    AM-PAC Daily Activity Inpatient   How much help for putting on and taking off regular lower body clothing?: A Little  How much help for Bathing?: A Little  How much help for Toileting?: None  How much help for putting on and taking off regular upper body clothing?: None  How much help for taking care of personal grooming?: None  How much help for eating meals?: None  [facility] Inpatient Daily Activity Raw Score: 22  AM-PAC Inpatient ADL T-Scale Score : 47.1  ADL Inpatient CMS 0-100% Score: 25.8  ADL Inpatient CMS G-Code Modifier : CJ           SUBJECTIVE:     Ms. Rubia Barajas states, \"I will wear my slides\"     Social/Functional Lives With: Family  Type of Home: House  Home Layout: One level  Home Access: Level entry  Bathroom Shower/Tub: Shower chair with back  Home Equipment: [employer] Help From: Family  ADL Assistance: Needs assistance  Bath: Moderate assistance  Dressing: Independent  Grooming: Independent  Feeding: Independent  Homemaking Assistance: Independent  Ambulation Assistance: Independent  Transfer Assistance: Independent  Active : Yes  Mode of Transportation: Car  Occupation: Works at home  Type of Occupation: IVC    OBJECTIVE:     Lourdes Vázqeuz / Jennifer Farah / Yuri Espinosa: IV and Telemetry     RESTRICTIONS/PRECAUTIONS:       PAIN: Freddie Remak / O2:   Pre Treatment:    Did not c/o pain      Post Treatment: did not c/o pain       Vitals          Oxygen            GROSS EVALUATION: INTACT IMPAIRED   (See Comments)   UE AROM [x] []   UE PROM [x] []   Strength []  Generalized weakness but functional     Posture / Balance [] Sitting - Static: Good  Sitting - Dynamic: Good  Standing - Static: Fair, +  Standing - Dynamic: Fair   Sensation [x]     Coordination [x]       Tone [x]       Edema [x]    Activity Tolerance []  Decreased,limited by fatigue     Hand Dominance R [] L []      COGNITION/  PERCEPTION: INTACT IMPAIRED   (See Comments)   Orientation [x]     Vision [x]     Hearing [x]     Cognition  [x]     Perception [x]       MOBILITY: I Mod I S SBA CGA Min Mod Max Total  NT x2 Comments:   Bed Mobility    Rolling [] [] [] [] [] [] [] [] [] [] []    Supine to Sit [] [] [x] [] [] [] [] [] [] [] []    Scooting [] [] [] [] [] [] [] [] [] [] []    Sit to Supine [] [] [x] [] [] [] [] [] [] [] []    Transfers    Sit to Stand [] [] [] [x] [] [] [] [] [] [] []    Bed to Chair [] [] [] [] [] [] [] [] [] [] []    Stand to Sit [] [] [] [x] [] [] [] [] [] [] []    Tub/Shower [] [] [] [] [] [] [] [] [] [] []     Toilet [] [] [] [] [] [] [] [] [] [] []      [] [] [] [] [] [] [] [] [] [] []    I=Independent, Mod I=Modified Independent, S=Supervision/Setup, SBA=Standby Assistance, CGA=Contact Guard Assistance, Min=Minimal Assistance, Mod=Moderate Assistance, Max=Maximal Assistance, Total=Total Assistance, NT=Not Tested    ACTIVITIES OF DAILY LIVING: I Mod I S SBA CGA Min Mod Max Total NT Comments   BASIC ADLs:              Upper Body Bathing  [] [] [] [] [] [] [] [] [] []    Lower Body Bathing [] [] [] [] [] [] [] [] [] []    Toileting [] [] [] [] [] [] [] [] [] []    Upper Body Dressing [] [] [] [] [] [] [] [] [] []    Lower Body Dressing [] [] [] [] [] [] [] [] [] []    Feeding [] [] [] [] [] [] [] [] [] []    Grooming [] [] [] [] [] [] [] [] [] []    Personal Device Care [] [] [] [] [] [] [] [] [] []    Functional Mobility [] [] [] [x] [] [] [] [] [] [] With IV pole   I=Independent, Mod I=Modified Independent, S=Supervision/Setup, SBA=Standby Assistance, CGA=Contact Guard Assistance, Min=Minimal Assistance, Mod=Moderate Assistance, Max=Maximal Assistance, Total=Total Assistance, NT=Not Tested    PLAN:   FREQUENCY/DURATION   OT Plan of Care: 3 times/week for duration of hospital stay or until stated goals are met, whichever comes first.    PROBLEM LIST:   (Skilled intervention is medically necessary to address:)  Decreased ADL/Functional Activities  Decreased Activity Tolerance  Decreased Balance  Decreased Gait Ability  Decreased Strength  Decreased Transfer Abilities   INTERVENTIONS PLANNED:  (Benefits and precautions of occupational therapy have been discussed with the patient.)  Self Care Training  Therapeutic Activity  Therapeutic Exercise/HEP  Neuromuscular Re-education  Manual Therapy  Education         TREATMENT:     EVALUATION: MODERATE COMPLEXITY: (Untimed Charge)    TREATMENT:   Co-Treatment PT/OT necessary due to patient's decreased overall endurance/tolerance levels, as well as need for high level skilled assistance to complete functional transfers/mobility and functional tasks  Neuromuscular Re-education (8 Minutes): Neuromuscular Re-education included Balance Training, Functional mobility with facilitation, and Sitting balance training to improve Balance, Coordination, Functional Mobility, and Postural Control.     TREATMENT GRID:  N/A    AFTER TREATMENT PRECAUTIONS: Bed, Bed/Chair Locked, Call light within reach, Needs within reach, and RN notified    INTERDISCIPLINARY COLLABORATION:  RN/ PCT and PT/ PTA    EDUCATION:  Education Given To: Patient  Education Provided: Role of Therapy;Plan of Care  Education Method: Demonstration;Verbal  Barriers to Learning: None  Education Outcome: Verbalized understanding;Demonstrated understanding;Continued education needed    TOTAL TREATMENT DURATION AND TIME:  Time In: 0916  Time Out: 2843  Minutes: Rosa Stanton

## 2022-10-21 NOTE — CARE COORDINATION
Case Management Assessment  Initial Evaluation    Date/Time of Evaluation: 10/21/2022 10:05 AM  Assessment Completed by: ZAY Garcia    If patient is discharged prior to next notation, then this note serves as note for discharge by case management. Patient Name: Romero Schaeffer                   YOB: 1975  Diagnosis: Chronic abdominal pain [R10.9, G89.29]  Metastatic carcinoma ([address] [C79.9]  Intractable nausea and vomiting [R11.2]  Dysphagia, unspecified type [R13.10]                   Date / Time: 10/20/2022  4:13 PM    Patient Admission Status: Inpatient   Readmission Risk (Low < 19, Mod (19-27), High > 27): Readmission Risk Score: 28.1    Current PCP: None Provider  PCP verified by CM? No (Pt does not have a PCP and declined referral to [facility])    Chart Reviewed: Yes      History Provided by: Patient, Medical Record  Patient Orientation: Alert and Oriented, Person, Place, Self    Patient Cognition: Alert    Hospitalization in the last 30 days (Readmission):  Yes    If yes, Readmission Assessment in CM Navigator will be completed. Advance Directives:      Code Status: Full Code   Patient's Primary Decision Maker is: Legal Next of Kin    Primary [facility] - [phone]    Discharge Planning:    Patient lives with: Family Members Type of Home: House  Primary Care Giver: Self  Patient Support Systems include: Family Members, Other (Comment) [facility])   Current Financial resources: Other (Comment) (BCBS)  Current community resources:    Current services prior to admission: Home Care [facility])            Current DME:              Type of Home Care services:  Nursing Services    ADLS  Prior functional level: Independent in ADLs/IADLs  Current functional level: Independent in ADLs/IADLs    PT AM-PAC:   /24  OT AM-PAC:   /24    Family can provide assistance at DC:  Yes  Would you like Case Management to discuss the discharge plan with any other family members/significant others, and if so, who? No  Plans to Return to Present Housing: Yes  Other Identified Issues/Barriers to RETURNING to current housing: None noted  Potential Assistance needed at discharge: 1 Lucia Capone (Resume [facility])            Potential DME:  None identified at present time  Patient expects to discharge to: [facility] for transportation at discharge: Family    Financial    Payor: Kalli Berrios 150 / Plan: Stephanie Call / Product Type: *No Product type* /     Does insurance require precert for SNF: Yes    Potential assistance Purchasing Medications: No      [id number], [address] [place] [address]  Phone: [phone] Fax: [phone]      Notes:    Factors facilitating achievement of predicted outcomes: Family support, Pleasant, and Current with [facility]    Barriers to discharge: Decreased endurance and Medical complications    Additional Case Management Notes: 51 y/o female pt who is a readmission from Oct. 3-8, 2022. Readmission assessment completed. Pt has insurance with pharmacy benefits and is followed by Dr. Belkys Hatch at the [facility]. Pt does not have a PCP and declines a referral to [facility] or any other PCP referral source. Pt is current with [facility]. CM notified [facility] that pt is hospitalized. Pt is independent with ADLs at baseline. Pt has a PleurX with with she had at last d/c. Pt has had a 30# weight loss in the past month r/t intractable n/v.  Pt is scheduled for an EGD today. Per oncology NP note if pt does not show progress s/p EGD then TPN will be considered. PT/OT consults have been ordered and CM is awaiting recommendations. Current d/c plan is for pt to return home, and resume [address], when medically stable. CM will continue to follow and remain available if any needs arise.     The Plan for Transition of Care is related to the following treatment goals of Chronic abdominal pain [R10.9, G89.29]  Metastatic carcinoma ([facility] [address] [C79.9]  Intractable nausea and vomiting [R11.2]  Dysphagia, unspecified type [M24.66]    IF APPLICABLE: The Patient and/or patient representative Rajni Frank and her family were provided with a choice of provider and agrees with the discharge plan. Freedom of choice list with basic dialogue that supports the patient's individualized plan of care/goals and shares the quality data associated with the providers was provided to: Patient   Patient Representative Name:       The Patient and/or Patient Representative Agree with the Discharge Plan?       ZAY Calix  Case Management Department

## 2022-10-21 NOTE — PROGRESS NOTES
SPEECH LANGUAGE PATHOLOGY NOTE    Speech therapy consult received and appreciated. Per chart, GI plans for EGD today. Will follow up tomorrow for dysphagia evaluation.        [address] CCC-SLP

## 2022-10-21 NOTE — H&P
History and Physical for Procedure             Date: 10/21/2022     History of Present Illness:  Patient presents to undergo EGD.        Past Medical History:   Diagnosis Date    Anemia     -- not a problem since hyst    Colon cancer ([address] dx 2022     plans for chemo--- followed by dr Vj EL-19 2020    no hospitalization    GERD (gastroesophageal reflux disease)     managed with med    History of colonoscopy 2018    Dr. Ranjit Martin, nl (see media note), R     Hx of blood clots 2022    per pt \"small clot on lung identified by CT scan\"  CT Scan impression:- Nonobstructive pulmonary filling defect involving Left Lower Lobe     Hypotension     asymptomatic    Obstruction of fallopian tube     per pt has \"1 good tube\"    Peritoneal carcinomatosis ([address]     Weight loss     80lbs weight loss after gastric sleeve     Past Surgical History:   Procedure Laterality Date     SECTION      CYSTOSCOPY Bilateral 2022    CYSTOSCOPY BILATERAL RETROGRADE PYELOGRAM performed by Ewa Gilliland MD at [address] Bilateral 2022    [address] performed by Ewa Gilliland MD at [address] Bilateral 10/6/2022    CYSTOSCOPY [address], RIGHT URETERAL [address],[facility] [address] performed by Ewa Gilliland MD at [facility] MAIN OR    GASTRIC BYPASS SURGERY  2014    gastric sleeve- [place]    HYSTERECTOMY (CERVIX STATUS UNKNOWN)      2018    HYSTERECTOMY (CERVIX STATUS UNKNOWN)  2020    [facility] w/ Bilateral salpingectomy and left oophorectomy    IR PERC CATH PLEURAL DRAIN W/IMAG  2022    IR PERC CATH PLEURAL DRAIN W/IMAG 2022 [facility]    IR PORT PLACEMENT EQUAL OR GREATER THAN 5 YEARS  2022    IR PORT PLACEMENT EQUAL OR GREATER THAN 5 YEARS  2022    IR PORT PLACEMENT EQUAL OR GREATER THAN 5 YEARS 2022 [facility]    LAPAROTOMY N/A 2022    EXPLORATORY LAPAROTOMY/ ENTEROENTEROSTOMY performed by Eddie Leblanc MD at [facility]. [address]  age [place] Splinter 21s\"    also \"unblocked her FT\"    TONSILLECTOMY      UPPER GASTROINTESTINAL ENDOSCOPY      with dilation    UPPER GASTROINTESTINAL ENDOSCOPY N/A 9/13/2022    EGD ESOPHAGOGASTRODUODENOSCOPY OFL 17 To IR after recovery for Para performed by Krystal Alexis MD at [facility] ENDOSCOPY    UROLOGICAL SURGERY Bilateral 03/15/2022    cysto     In and  Family History   Problem Relation Age of Onset    Heart Disease Maternal Grandmother     Hypertension Maternal Grandmother     Heart Disease Maternal Grandfather     Hypertension Maternal Grandfather     Pulmonary Embolism Neg Hx     Colon Cancer Neg Hx     Deep Vein Thrombosis Neg Hx     Ovarian Cancer Neg Hx     Prostate Cancer Neg Hx     Breast Cancer Neg Hx      Social History     Tobacco Use    Smoking status: Never    Smokeless tobacco: Never   Substance Use Topics    Alcohol use: Not Currently        No Known Allergies  Current Facility-Administered Medications   Medication Dose Route Frequency    0.9% NaCl with KCl 40 mEq infusion   IntraVENous Continuous    sodium chloride flush 0.9 % injection 5-40 mL  5-40 mL IntraVENous 2 times per day    sodium chloride flush 0.9 % injection 5-40 mL  5-40 mL IntraVENous PRN    0.9 % sodium chloride infusion   IntraVENous PRN    ondansetron (ZOFRAN-ODT) disintegrating tablet 4 mg  4 mg Oral Q8H PRN    Or    ondansetron (ZOFRAN) injection 4 mg  4 mg IntraVENous Q6H PRN    acetaminophen (TYLENOL) tablet 650 mg  650 mg Oral Q6H PRN    Or    acetaminophen (TYLENOL) suppository 650 mg  650 mg Rectal Q6H PRN    polyethylene glycol (GLYCOLAX) packet 17 g  17 g Oral Daily PRN    hyoscyamine (LEVSIN/SL) sublingual tablet 125 mcg  125 mcg SubLINGual Q4H PRN    glucose chewable tablet 16 g  4 tablet Oral PRN    dextrose bolus 10% 125 mL  125 mL IntraVENous PRN    Or    dextrose bolus 10% 250 mL  250 mL IntraVENous PRN    glucagon (rDNA) injection 1 mg  1 mg SubCUTAneous PRN    dextrose 10 % infusion   IntraVENous Continuous PRN    insulin lispro (HUMALOG) injection vial 0-4 Units  0-4 Units SubCUTAneous TID WC    insulin lispro (HUMALOG) injection vial 0-4 Units  0-4 Units SubCUTAneous Nightly    HYDROmorphone HCl PF (DILAUDID) injection 0.5 mg  0.5 mg IntraVENous Q4H PRN    metoclopramide (REGLAN) injection 5 mg  5 mg IntraVENous q8h    [Held by provider] apixaban (ELIQUIS) tablet 5 mg  5 mg Oral BID    pantoprazole (PROTONIX) 40 mg in sodium chloride (PF) 10 mL injection  40 mg IntraVENous Q12H        Review of Systems:  A detailed 10 organ review of systems is obtained with pertinent positives as listed in the History of Present Illness. All others are negative. Objective:     Physical Exam:  /88   Pulse 90   Temp 97.8 °F (36.6 °C) (Oral)   Resp 18   Ht 5' 4\" (1.626 m)   Wt 94 lb 4.8 oz (42.8 kg)   SpO2 96%   BMI 16.19 kg/m²    General:  Alert and oriented. Heart: Regular rate and rhythm  Lungs:  Clear to auscultation bilaterally  Abdomen: Soft, nontender, nondistended    Impression/Plan:     Proceed with EGD as planned. I have discussed with the patient the technique, benefits, alternatives, and risks of these procedures, including medication reaction, immediate or delayed bleeding, or perforation of the gastrointestinal tract.      Signed By: Wan Griffin MD     October 21, 2022

## 2022-10-21 NOTE — OP NOTE
Procedure:  Esophagogastroduodenoscopy    Date of Procedure:  57/61/2971    Patient:  Dino Acuña     1975    Indication:  Dysphagia    Sedation:  MAC    Pre-Procedure Physical Exam:    Mental status:  alert and oriented  Airway:  normal oropharyngeal airway and neck mobility  CV:  regular rate and rhythm  Respiratory:  clear to auscultation    Procedure:  A History and Physical has been performed, and patient medication allergies have been reviewed. Risks of perforation, hemorrhage, adverse drug reaction, and aspiration were discussed. Informed consent was obtained for the procedure, including sedation. The patient was placed in the left lateral decubitus position. The heart rate, oxygen saturations, blood pressure, and response to care were monitored throughout the procedure. The gastroscope was passed through the mouth and advanced under direct vision to the second portion of the duodenum. A careful inspection was made as the gastroscope was withdrawn, including a retroflexed view of the proximal stomach. The patient tolerated the procedure well. Findings:     OROPHARYNX: Cords and pyriform recesses appear normal.   ESOPHAGUS: LA grade D esophagitis was seen in the distal esophagus. Mucosal breaks involving more than 75% of esophageal circumference were seen. A proximal esophageal stricture and Schatzki ring were encountered. These were dilated using a CRE balloon dilator to a maximum and inner diameter of 18 mm. Successful dilation was confirmed by the presence of mucosal disruption. STOMACH:   On retroflexion, the flap-valve is classified as Hill Grade II, with a tissue fold at the lesser curvature but with periodic opening and closing around the endoscope. Evidence of prior gastric sleeve is seen.   DUODENUM: The bulb and second portions are normal.    Specimen:    No    Estimated Blood Loss:  Minimal    Implant:  None           Impression:    LA grade D erosive esophagitis. Proximal esophageal stricture. Schatzki ring. Plan:  1. Full liquids today; advance tomorrow as tolerated. 2. Protonix 40 mg twice daily, taken prior to breakfast and dinner. 3. Avoid NSAIDs. 4. We will sign off, but do not hesitate to contact us for any additional assistance. We will arrange for a GI follow-up office visit in 4-8 weeks. Patient will be contacted by our office.     Signed:  Enrrique Ortiz MD  10/21/2022  2:10 PM

## 2022-10-21 NOTE — PROGRESS NOTES
TRANSFER - OUT REPORT:    Verbal report given to [place], RN on [employer]  being transferred to 5th floor for routine progression of patient care       Report consisted of patient's Situation, Background, Assessment and   Recommendations(SBAR). Information from the following report(s) Nurse Handoff Report was reviewed with the receiving nurse. Lines:   Single Lumen Implantable Port Right Subclavian (Active)   Port A Cath Status Accessed 10/21/22 0012   Criteria for Appropriate Use Long term IV/antibiotic administration 10/21/22 0012   Site Assessment Clean, dry & intact 10/21/22 1328   Line Care Cap changed 10/21/22 0012   Alcohol Cap Used Yes 10/21/22 0012   Date of Last Dressing Change 10/20/22 10/21/22 0012   Dressing Type Transparent 10/21/22 1328   Dressing Status Clean, dry & intact 10/21/22 1328   Dressing Intervention New 10/20/22 1045   Date Accessed  10/20/22 10/21/22 0012   Access Attempts  1 10/20/22 1045   Access Needle Gauge 20 G 10/20/22 1045   Access Needle Length 0.75 inches 10/20/22 1045   Single Lumen Status Blood return noted; Flushed; Infusing;Lab draw 10/21/22 0358   De-Access Date 10/20/22 10/20/22 1423   De-Access Time 1423 10/20/22 1423        Opportunity for questions and clarification was provided.       Patient will be transported with:  Transport Tech  1455-To room

## 2022-10-21 NOTE — H&P
[facility] Hematology & Oncology        Inpatient Hematology / Oncology History and Physical    Reason for Admission:  Chronic abdominal pain [R10.9, G89.29]  Metastatic carcinoma ([address] [C79.9]  Intractable nausea and vomiting [R11.2]  Dysphagia, unspecified type [R13.10]    History of Present Illness:  Ms. Leonid Vu is a 52 y.o. female admitted on 10/20/2022. The primary encounter diagnosis was Dysphagia, unspecified type. Diagnoses of Chronic abdominal pain and Metastatic carcinoma ([address] were also pertinent to this visit. Jennifer Courts Her PMH includes sleeve gastrectomy, hydronephrosis s/p ureteral stents with recent R stent exchange with unsuccessful L exchange d/t tumor, recent GIB with neg EGD, recurrent ascites s/p pleurx drain 9/26, DVT on Eliquis, and hx esophageal strictures. 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 She completed C9 FOLFOX/Avastin on 9/8/22. Treatment recently changed d/t POD. She is s/p C1D8 paclitaxel/Cyramza on 10/20. She has had multiple recent admissions for intractable nausea and vomiting, most recently from 10/3 - 10/7/22 for colitis vs serosal implants of tumor. She was given CTX/Flagyl and then changed to LVQ/Flagyl. She was discharged home on Flagyl/Cipro. She returned to ED on 10/20 with c/o nausea, vomiting, and abd pain. She reports difficulty swallowing foods and liquids. Feels like food gets stuck for the past 3 days, causing her to vomit any time she eats. CT AP with peritoneal carcinomatosis and increased ascites; persistent diffuse bowel wall thickening; persistent but improved b/l hydronephrosis; and diffuse bladder wall thickening. CXR neg. GI following, plans for EGD with possible dilation today. We were asked for recommendations for our patient. Review of Systems:  Constitutional Denies fever, chills, night sweats.    HEENT Denies trauma, blurry vision, hearing loss, ear pain, nosebleeds, sore throat, neck pain and ear discharge. Skin Denies lesions or rashes. Lungs Denies dyspnea, cough, sputum production or hemoptysis. Cardiovascular Denies chest pain, palpitations, or lower extremity edema. Gastrointestinal +N/V, abd pain. Denies changes in bowel habits, bloody or black stools.  Denies dysuria, frequency or hesitancy of urination. Neuro Denies headaches, visual changes or ataxia. Denies dizziness, tingling, tremors, sensory change, speech change, focal weakness or headaches. Hematology Denies easy bruising or bleeding, denies gingival bleeding or epistaxis. Endo Denies heat/cold intolerance, denies diabetes or thyroid abnormalities. MSK Denies back pain, arthralgias, myalgias or frequent falls. Psychiatric/Behavioral Denies depression and substance abuse. The patient is not nervous/anxious.          No Known Allergies  Past Medical History:   Diagnosis Date    Anemia     -- not a problem since hyst    Colon cancer ([facility] dx 2022     plans for chemo--- followed by dr Tootie Bernal    COVID-19 2020    no hospitalization    GERD (gastroesophageal reflux disease)     managed with med    History of colonoscopy 2018    Dr. Ann Marie Cuadra, nl (see media note), R     Hx of blood clots 2022    per pt \"small clot on lung identified by CT scan\"  CT Scan impression:- Nonobstructive pulmonary filling defect involving Left Lower Lobe     Hypotension     asymptomatic    Obstruction of fallopian tube     per pt has \"1 good tube\"    Peritoneal carcinomatosis ([facility]     Weight loss     80lbs weight loss after gastric sleeve     Past Surgical History:   Procedure Laterality Date     SECTION      CYSTOSCOPY Bilateral 2022    CYSTOSCOPY BILATERAL RETROGRADE PYELOGRAM performed by Evaristo Glass MD at [address] Bilateral 2022    BILATERAL CYSTOSCOPY URETERAL STENT EXCHANGE performed by Evaristo Glass MD at [address] Bilateral 10/6/2022    CYSTOSCOPY BILATERAL URETERAL STENT REMOVAL, RIGHT URETERAL STENT PLACEMENT performed by Alex Rios MD at [facility] MAIN OR    GASTRIC BYPASS SURGERY  07/23/2014    gastric sleeve- [place]    HYSTERECTOMY (CERVIX STATUS UNKNOWN)      2018    HYSTERECTOMY (CERVIX STATUS UNKNOWN)  07/09/2020    TLH w/ Bilateral salpingectomy and left oophorectomy    IR PERC CATH PLEURAL DRAIN W/IMAG  9/26/2022    IR PERC CATH PLEURAL DRAIN W/IMAG 9/26/2022 [facility]    IR PORT PLACEMENT EQUAL OR GREATER THAN 5 YEARS  2/28/2022    IR PORT PLACEMENT EQUAL OR GREATER THAN 5 YEARS  2/28/2022    IR PORT PLACEMENT EQUAL OR GREATER THAN 5 YEARS 2/28/2022 [facility]    LAPAROTOMY N/A 8/17/2022    EXPLORATORY LAPAROTOMY/ ENTEROENTEROSTOMY performed by David Larry MD at [facility]. [address]  age Slime Deis 21s\"    also \"unblocked her FT\"    TONSILLECTOMY      UPPER GASTROINTESTINAL ENDOSCOPY      with dilation    UPPER GASTROINTESTINAL ENDOSCOPY N/A 9/13/2022    EGD ESOPHAGOGASTRODUODENOSCOPY OFL 17 To IR after recovery for Para performed by Uabldo Jensen MD at [facility] ENDOSCOPY    UROLOGICAL SURGERY Bilateral 03/15/2022    cysto     Family History   Problem Relation Age of Onset    Heart Disease Maternal Grandmother     Hypertension Maternal Grandmother     Heart Disease Maternal Grandfather     Hypertension Maternal Grandfather     Pulmonary Embolism Neg Hx     Colon Cancer Neg Hx     Deep Vein Thrombosis Neg Hx     Ovarian Cancer Neg Hx     Prostate Cancer Neg Hx     Breast Cancer Neg Hx      Social History     Socioeconomic History    Marital status: Single     Spouse name: Not on file    Number of children: Not on file    Years of education: Not on file    Highest education level: Not on file   Occupational History    Not on file   Tobacco Use    Smoking status: Never    Smokeless tobacco: Never   Vaping Use    Vaping Use: Never used   Substance and Sexual Activity    Alcohol use: Not Currently    Drug use: No    Sexual activity: Not on file   Other Topics Concern    Not on file   Social History Narrative    1. Use large speculum. 2.  sister, Brigida Toussaint #[id number] and mom is Leticia Mattson #[id number] (both Kofoed's pts)  3.   PCP  Dr. Cirilo Malin ([facility]), GI Dr. Ho Other normal EGD     Social Determinants of Health     Financial Resource Strain: Not on file   Food Insecurity: Not on file   Transportation Needs: Not on file   Physical Activity: Not on file   Stress: Not on file   Social Connections: Not on file   Intimate Partner Violence: Not on file   Housing Stability: Not on file     Current Facility-Administered Medications   Medication Dose Route Frequency Provider Last Rate Last Admin    0.9% NaCl with KCl 40 mEq infusion   IntraVENous Continuous Marissa La Tour, DO 75 mL/hr at 10/20/22 2157 New Bag at 10/20/22 2157    sodium chloride flush 0.9 % injection 5-40 mL  5-40 mL IntraVENous 2 times per day Marissa La Tour, DO   10 mL at 10/21/22 8110    sodium chloride flush 0.9 % injection 5-40 mL  5-40 mL IntraVENous PRN Marissa La Tour, DO        0.9 % sodium chloride infusion   IntraVENous PRN Marissa La Tour, DO        ondansetron (ZOFRAN-ODT) disintegrating tablet 4 mg  4 mg Oral Q8H PRN Marissa La Tour, DO        Or    ondansetron [facility]) injection 4 mg  4 mg IntraVENous Q6H PRN Marissa La Tour, DO   4 mg at 10/21/22 0751    acetaminophen (TYLENOL) tablet 650 mg  650 mg Oral Q6H PRN Marissa La Tour, DO        Or    acetaminophen (TYLENOL) suppository 650 mg  650 mg Rectal Q6H PRN Marissa La Tour, DO        polyethylene glycol (GLYCOLAX) packet 17 g  17 g Oral Daily PRN Marissa La Tour, DO        hyoscyamine (LEVSIN/SL) sublingual tablet 125 mcg  125 mcg SubLINGual Q4H PRN Marissa La Tour, DO        glucose chewable tablet 16 g  4 tablet Oral PRN Marissa La Tour, DO        dextrose bolus 10% 125 mL  125 mL IntraVENous PRN Marissa La Tour, DO        Or    dextrose bolus 10% 250 mL  250 mL IntraVENous PRN Steffany Irving, DO        glucagon (rDNA) injection 1 mg  1 mg SubCUTAneous PRN Steffany Irving, DO        dextrose 10 % infusion   IntraVENous Continuous PRN Steffany Irving, DO        insulin lispro (HUMALOG) injection vial 0-4 Units  0-4 Units SubCUTAneous TID WC Steffany Irving, DO        insulin lispro (HUMALOG) injection vial 0-4 Units  0-4 Units SubCUTAneous Nightly Steffany Irving, DO        HYDROmorphone HCl PF (DILAUDID) injection 0.5 mg  0.5 mg IntraVENous Q4H PRN as, DO   0.5 mg at 10/21/22 7591    metoclopramide (REGLAN) injection 5 mg  5 mg IntraVENous q8h , DO   5 mg at 10/21/22 0348    [Held by provider] apixaban (ELIQUIS) tablet 5 mg  5 mg Oral BID , DO        pantoprazole (PROTONIX) 40 mg in sodium chloride (PF) 10 mL injection  40 mg IntraVENous Q12H , DO   40 mg at 10/21/22 0849       OBJECTIVE:  Patient Vitals for the past 8 hrs:   BP Temp Temp src Pulse Resp SpO2   10/21/22 0805 106/76 97.6 °F (36.4 °C) Oral 95 18 94 %   10/21/22 0340 121/88 97.8 °F (36.6 °C) Oral 90 18 96 %     Temp (24hrs), Av °F (36.7 °C), Min:97.6 °F (36.4 °C), Max:98.5 °F (36.9 °C)    10/21 0701 - 10/21 1900  In: -   Out: 400 [Urine:400]    Physical Exam:  Constitutional: Chronically ill-appearing female in no acute distress, sitting comfortably in the hospital bed. HEENT: Normocephalic and atraumatic. Oropharynx is clear, mucous membranes are moist.  Extraocular muscles are intact. Sclerae anicteric. Neck supple without JVD. No thyromegaly present. Skin Warm and dry. No bruising and no rash noted. No erythema. No pallor. Respiratory Lungs are clear to auscultation bilaterally without wheezes, rales or rhonchi, normal air exchange without accessory muscle use. CVS Normal rate, regular rhythm and normal S1 and S2. No murmurs, gallops, or rubs. Abdomen Soft, nontender and nondistended, normoactive bowel sounds. No palpable mass. No hepatosplenomegaly. Neuro Grossly nonfocal with no obvious sensory or motor deficits. MSK Normal range of motion in general.  No edema and no tenderness. Psych Appropriate mood and affect.         Labs:    Recent Results (from the past 24 hour(s))   CBC with Auto Differential    Collection Time: 10/20/22 10:37 AM   Result Value Ref Range    WBC 5.7 4.3 - 11.1 K/uL    RBC 3.72 (L) 4.05 - 5.2 M/uL    Hemoglobin 10.1 (L) 11.7 - 15.4 g/dL    Hematocrit 32.6 (L) 35.8 - 46.3 %    MCV 87.6 82.0 - 102.0 FL    MCH 27.2 26.1 - 32.9 PG    MCHC 31.0 (L) 31.4 - 35.0 g/dL    RDW 17.1 (H) 11.9 - 14.6 %    Platelets 889 024 - 221 K/uL    MPV 9.6 9.4 - 12.3 FL    nRBC 0.02 0.0 - 0.2 K/uL    Differential Type AUTOMATED      Seg Neutrophils 60 43 - 78 %    Lymphocytes 33 13 - 44 %    Monocytes 6 4.0 - 12.0 %    Eosinophils % 1 0.5 - 7.8 %    Basophils 0 0.0 - 2.0 %    Immature Granulocytes 1 0.0 - 5.0 %    Segs Absolute 3.4 1.7 - 8.2 K/UL    Absolute Lymph # 1.9 0.5 - 4.6 K/UL    Absolute Mono # 0.4 0.1 - 1.3 K/UL    Absolute Eos # 0.0 0.0 - 0.8 K/UL    Basophils Absolute 0.0 0.0 - 0.2 K/UL    Absolute Immature Granulocyte 0.0 0.0 - 0.5 K/UL   Comprehensive Metabolic Panel    Collection Time: 10/20/22 10:37 AM   Result Value Ref Range    Sodium 134 133 - 143 mmol/L    Potassium 3.6 3.5 - 5.1 mmol/L    Chloride 97 (L) 101 - 110 mmol/L    CO2 27 21 - 32 mmol/L    Anion Gap 10 2 - 11 mmol/L    Glucose 187 (H) 65 - 100 mg/dL    BUN 11 6 - 23 MG/DL    Creatinine 0.50 (L) 0.6 - 1.0 MG/DL    Est, Glom Filt Rate >60 >60 ml/min/1.73m2    Calcium 8.5 8.3 - 10.4 MG/DL    Total Bilirubin 0.4 0.2 - 1.1 MG/DL    ALT 13 12 - 65 U/L    AST 13 (L) 15 - 37 U/L    Alk Phosphatase 90 50 - 136 U/L    Total Protein 6.5 6.3 - 8.2 g/dL    Albumin 3.0 (L) 3.5 - 5.0 g/dL    Globulin 3.5 2.8 - 4.5 g/dL    Albumin/Globulin Ratio 0.9 0.4 - 1.6     CBC with Auto Differential    Collection Time: 10/20/22  4:46 PM   Result Value Ref Range    WBC 4.7 4.3 - 11.1 K/uL    RBC 3.35 (L) 4.05 - 5.2 M/uL    Hemoglobin 9.2 (L) 11.7 - 15.4 g/dL    Hematocrit 29.6 (L) 35.8 - 46.3 %    MCV 88.4 82 - 102 FL    MCH 27.5 26.1 - 32.9 PG    MCHC 31.1 (L) 31.4 - 35.0 g/dL    RDW 17.1 (H) 11.9 - 14.6 %    Platelets 352 671 - 820 K/uL    MPV 10.4 9.4 - 12.3 FL    nRBC 0.04 0.0 - 0.2 K/uL    Differential Type AUTOMATED      Seg Neutrophils 80 (H) 43 - 78 %    Lymphocytes 16 13 - 44 %    Monocytes 3 (L) 4.0 - 12.0 %    Eosinophils % 0 (L) 0.5 - 7.8 %    Basophils 0 0.0 - 2.0 %    Immature Granulocytes 1 0.0 - 5.0 %    Segs Absolute 3.8 1.7 - 8.2 K/UL    Absolute Lymph # 0.7 0.5 - 4.6 K/UL    Absolute Mono # 0.2 0.1 - 1.3 K/UL    Absolute Eos # 0.0 0.0 - 0.8 K/UL    Basophils Absolute 0.0 0.0 - 0.2 K/UL    Absolute Immature Granulocyte 0.0 0.0 - 0.5 K/UL   Comprehensive Metabolic Panel    Collection Time: 10/20/22  4:46 PM   Result Value Ref Range    Sodium 135 133 - 143 mmol/L    Potassium 3.8 3.5 - 5.1 mmol/L    Chloride 100 (L) 101 - 110 mmol/L    CO2 25 21 - 32 mmol/L    Anion Gap 10 2 - 11 mmol/L    Glucose 159 (H) 65 - 100 mg/dL    BUN 10 6 - 23 MG/DL    Creatinine 0.50 (L) 0.6 - 1.0 MG/DL    Est, Glom Filt Rate >60 >60 ml/min/1.73m2    Calcium 8.2 (L) 8.3 - 10.4 MG/DL    Total Bilirubin 0.4 0.2 - 1.1 MG/DL    ALT 13 12 - 65 U/L    AST 15 15 - 37 U/L    Alk Phosphatase 81 50 - 136 U/L    Total Protein 6.3 6.3 - 8.2 g/dL    Albumin 2.9 (L) 3.5 - 5.0 g/dL    Globulin 3.4 2.8 - 4.5 g/dL    Albumin/Globulin Ratio 0.9 0.4 - 1.6     Lipase    Collection Time: 10/20/22  4:46 PM   Result Value Ref Range    Lipase 37 (L) 73 - 393 U/L   Magnesium    Collection Time: 10/20/22  4:46 PM   Result Value Ref Range    Magnesium 1.5 (L) 1.8 - 2.4 mg/dL   Troponin    Collection Time: 10/20/22  4:46 PM   Result Value Ref Range    Troponin, High Sensitivity 11.5 0 - 14 pg/mL   POCT Glucose    Collection Time: 10/20/22 10:26 PM   Result Value Ref Range    POC Glucose 149 (H) 65 - 100 mg/dL    Performed by: Tim CHIANG with Auto Differential    Collection Time: 10/21/22  3:24 AM   Result Value Ref Range    WBC 5.5 4.3 - 11.1 K/uL    RBC 2.94 (L) 4.05 - 5.2 M/uL    Hemoglobin 8.1 (L) 11.7 - 15.4 g/dL    Hematocrit 26.5 (L) 35.8 - 46.3 %    MCV 90.1 82 - 102 FL    MCH 27.6 26.1 - 32.9 PG    MCHC 30.6 (L) 31.4 - 35.0 g/dL    RDW 17.2 (H) 11.9 - 14.6 %    Platelets 875 784 - 114 K/uL    MPV 10.6 9.4 - 12.3 FL    nRBC 0.07 0.0 - 0.2 K/uL    Differential Type AUTOMATED      Seg Neutrophils 61 43 - 78 %    Lymphocytes 33 13 - 44 %    Monocytes 5 4.0 - 12.0 %    Eosinophils % 0 (L) 0.5 - 7.8 %    Basophils 0 0.0 - 2.0 %    Immature Granulocytes 1 0.0 - 5.0 %    Segs Absolute 3.3 1.7 - 8.2 K/UL    Absolute Lymph # 1.8 0.5 - 4.6 K/UL    Absolute Mono # 0.3 0.1 - 1.3 K/UL    Absolute Eos # 0.0 0.0 - 0.8 K/UL    Basophils Absolute 0.0 0.0 - 0.2 K/UL    Absolute Immature Granulocyte 0.1 0.0 - 0.5 K/UL   Magnesium    Collection Time: 10/21/22  3:24 AM   Result Value Ref Range    Magnesium 2.2 1.8 - 2.4 mg/dL   Comprehensive Metabolic Panel    Collection Time: 10/21/22  3:24 AM   Result Value Ref Range    Sodium 138 133 - 143 mmol/L    Potassium 4.1 3.5 - 5.1 mmol/L    Chloride 104 101 - 110 mmol/L    CO2 27 21 - 32 mmol/L    Anion Gap 7 2 - 11 mmol/L    Glucose 109 (H) 65 - 100 mg/dL    BUN 10 6 - 23 MG/DL    Creatinine 0.60 0.6 - 1.0 MG/DL    Est, Glom Filt Rate >60 >60 ml/min/1.73m2    Calcium 8.1 (L) 8.3 - 10.4 MG/DL    Total Bilirubin 0.3 0.2 - 1.1 MG/DL    ALT 11 (L) 12 - 65 U/L    AST 12 (L) 15 - 37 U/L    Alk Phosphatase 76 50 - 136 U/L    Total Protein 6.0 (L) 6.3 - 8.2 g/dL    Albumin 2.9 (L) 3.5 - 5.0 g/dL    Globulin 3.1 2.8 - 4.5 g/dL    Albumin/Globulin Ratio 0.9 0.4 - 1.6     Hemoglobin A1c    Collection Time: 10/21/22  3:24 AM   Result Value Ref Range    Hemoglobin A1C 5.7 (H) 4.8 - 5.6 %    eAG 117 mg/dL   POCT Glucose    Collection Time: 10/21/22  8:25 AM   Result Value Ref Range    POC Glucose 100 65 - 100 mg/dL    Performed by: EskewWestPCTJessica    Urinalysis w rflx microscopic    Collection Time: 10/21/22  8:28 AM   Result Value Ref Range    Color, UA YELLOW/STRAW      Appearance TURBID      Specific Gravity, UA >1.035 (H) 1.001 - 1.023    pH, Urine 5.5 5.0 - 9.0      Protein,  (A) NEG mg/dL    Glucose, UA Negative mg/dL    Ketones, Urine TRACE (A) NEG mg/dL    Bilirubin Urine Negative NEG      Blood, Urine LARGE (A) NEG      Urobilinogen, Urine 0.2 0.2 - 1.0 EU/dL    Nitrite, Urine Negative NEG      Leukocyte Esterase, Urine MODERATE (A) NEG      WBC, UA >100 0 /hpf    RBC, UA >100 0 /hpf    Epithelial Cells UA 5-10 0 /hpf    BACTERIA, URINE 3+ (H) 0 /hpf    Casts HYALINE 0 /lpf    Mucus, UA TRACE 0 /lpf    Yeast, UA MODERATE         Imaging:  Xray Result (most recent):  XR CHEST LIMITED ONE VIEW 10/20/2022    Narrative  Chest X-ray    INDICATION: Chest pain    AP/PA view of the chest was obtained. FINDINGS: The lungs are clear. There are no infiltrates or effusions. The heart  size is normal.  Vascular port is present. Impression  No acute findings in the chest    CT Result (most recent):  CT ABDOMEN PELVIS W IV CONTRAST 10/20/2022    Narrative  CT OF THE ABDOMEN AND PELVIS    INDICATION: Abdominal pain and nausea, history of peritoneal carcinomatosis. Multiple axial images were obtained through the abdomen and pelvis. Oral  contrast was used for bowel opacification. 100mL of Isovue 370 intravenous  contrast was used for better evaluation of solid organs and vascular structures. Radiation dose reduction techniques were used for this study. All CT scans  performed at this facility use one or all of the following: Automated exposure  control, adjustment of the mA and/or kVp according to patient's size, iterative  reconstruction. COMPARISON: 10/03/2022    FINDINGS:  - LUNG BASES: Small left pleural effusion. Right lung base is clear.    - LIVER: Normal in size and appearance.   - GALLBLADDER/BILE DUCTS: Stable mild bile duct dilatation.  - PANCREAS: Normal.  - SPLEEN: Small, mottled enhancement    - ADRENALS: Normal.  - KIDNEYS/URETERS: Right ureteral stent is in place. Decreased right  hydronephrosis. Left ureteral stent has been removed. Only mild residual  prominence of the left collecting system. - BLADDER: Distal end of the right ureteral stent is in the bladder. While  diffuse bladder wall thickening.  - REPRODUCTIVE ORGANS: Post hysterectomy.    - BOWEL: Mild diffuse bowel wall thickening, both small bowel and colon. Esophagus is distended. No bowel distention.  - LYMPH NODES: No significant retroperitoneal, mesenteric, or pelvic adenopathy.  - BONES: No fracture or significant bone lesion.  - VASCULATURE: Normal  - OTHER: Peritoneal nodularity is again noted. There is increased ascites. Percutaneous strain is present in the right abdomen. Impression  1. Peritoneal carcinomatosis and increased ascites. 2.  Persistent diffuse bowel wall thickening. 3.  Persistent but improved bilateral hydronephrosis. 4.  Diffuse bladder wall thickening.       If there are any questions about this report, I can be reached on [email or web address]  or at [phone]      ASSESSMENT:  Patient Active Problem List   Diagnosis    Fibroids    Paraesophageal hernia    Menorrhagia with regular cycle    Papanicolaou smear of cervix with low grade squamous intraepithelial lesion (LGSIL)    Iron deficiency anemia due to chronic blood loss    Anemia    History of weight loss surgery    Carcinoma of unknown primary ([address]    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 ([address]    Bilateral hydronephrosis    Peritoneal carcinoma ([address]    Peritoneal carcinomatosis ([address]    Hematemesis    Current use of long term anticoagulation    Intractable vomiting    Generalized abdominal pain    LFT elevation    Obstruction of both ureters    Abdominal pain    Hypokalemia    Hypomagnesemia    Hypoalbuminemia    GERD (gastroesophageal reflux disease)    Colitis Intractable vomiting with nausea    Fever    CINV (chemotherapy-induced nausea and vomiting)    Intractable nausea and vomiting    Moderate protein-calorie malnutrition (HCC)           PLAN:  Metastatic carcinoma of unknown primary / peritoneal carcinomatosis  - s/p FOLFOX/Avastin with POD  - s/p C1D8 paclitaxel/Cyramza on 10/20    Nausea / Vomiting / Dysphagia  - CT AP with peritoneal carcinomatosis and increased ascites; persistent diffuse bowel wall thickening; persistent but improved b/l hydronephrosis; and diffuse bladder wall thickening  - GI following, EGD with possible dilation today  - If no improvement after EGD, will consider TPN  - RD consulted  - Check UA     UTI  - UA c/w UTI  - Ucx ordered  - Start CTX    Hx DVT  - on Eliquis, held for procedure    Continue home meds  Andres SOPs  On Eliquis (held for procedure)    Goals and plan of care reviewed with the patient. All questions answered to the best of our ability. Lab studies and imaging studies were personally reviewed. Thank you for allowing us to participate in the care of Ms. Marla Bernal. We will assume care of our patient, discussed with Dr. Roseann Driver.               SCOTT Lassiter [address] Hematology & Oncology  [address] [address]  Office : [phone]  Fax : [phone]

## 2022-10-21 NOTE — PROGRESS NOTES
TRANSFER - IN REPORT:    Verbal report received from [facility] on [employer]  being received from 512 for ordered procedure      Report consisted of patient's Situation, Background, Assessment and   Recommendations(SBAR). Information from the following report(s) Nurse Handoff Report was reviewed with the receiving nurse. Opportunity for questions and clarification was provided. Assessment completed upon patient's arrival to unit and care assumed.

## 2022-10-21 NOTE — CONSULTS
Comprehensive Nutrition Assessment    Type and Reason for Visit: Initial, Consult  Poor Intake/Appetite 5 or More Days (Hospitalists)    Nutrition Recommendations/Plan:   Meals and Snacks:  Diet: Continue current diet and advance as medically appropriate  Nutrition Supplement Therapy:  Medical food supplement therapy:  Initiate Ensure High Protein twice per day (this provides 160 kcal and 16 grams protein per bottle) and Ensure Clear three times per day (this provides 240 kcal and 8 grams protein per bottle)     Malnutrition Assessment:  Malnutrition Status: Severe malnutrition  Context: Chronic Illness  Findings of clinical characteristics of malnutrition:   Energy Intake:  Mild decrease in energy intake (Comment) (unable to tolerate even water for unknown time frame)  Weight Loss:  Greater than 20% over 1 year (42# (30.9%))     Body Fat Loss:  Severe body fat loss Triceps, Fat Overlying Ribs   Muscle Mass Loss:  Severe muscle mass loss Temples (temporalis), Clavicles (pectoralis & deltoids), Scapula (trapezius)  Fluid Accumulation:  No significant fluid accumulation     Strength:  Not Performed     Nutrition Assessment:  Nutrition History: Patient known to RD from recent admission. She was eating normall prior to recent admission. Usual intake was 3 meals per day. She has historically not tolerated Ensure/Boost products. PO intake last admission was 1-25% of meals but did drink Ensure Clear well. Today she states that she has been having difficulty swallowing and everything she tried to eat or drink has been coming back up. She states that is progressively got worse until she was unable to tolerate even water.       Do You Have Any Cultural, Sabianism, or Ethnic Food Preferences?: Yes (Comment)   Nutrition Background:       Patient with PMH significant for gastric sleeve, hydronephrosis with stents and recent right stent exchange (left unable to be exchanged due to tumor), metastatic adeno with suspected GI origin with peritoneal carcinomatosis on diagnosis, debulking unsuccessful, recent GIB, DVT, Pleurx placement 9/26/22. She presented with abdominal pain, difficulty swallowing \"stuck in throat and vomits. \" She was supposed to have outpt EGD but presented to ER with intractable nausea and vomiting. She is now s/p EGD with findings of LA grade D esophagitis, proximal esophageal stricture, Sckatzki's ring. Nutrition Interval:  Patient seen sitting up in bed. She is clearly thinner that last time she was seen by this RD. She gives history as above and states that since her diagnosis in February weight loss has been her primary concern. She has Ensure Clear that was provided by nursing. She states that she prefers the apple as the berry is too sweet. We discussed the other Ensure products and she states that she milky ones are too thick. She is agreeable to trial of Ensure High Protein. Explained if it is too thick, there is skim milk on floor to dilute. Discussed small frequent meals and rationale. She voiced understanding and was agreeable. She denies any needs. Current Nutrition Therapies:  ADULT DIET;  Full Liquid    Current Intake:   Average Meal Intake:  (no meals yet) Average Supplements Intake: None Ordered      Anthropometric Measures:  Height: 5' 4\" (162.6 cm)  Current Body Wt: 94 lb (42.6 kg) (10/21), Weight source: Stated  BMI: 16.1, Underweight (BMI less than 18.5)  Admission Body Weight: 92 lb (41.7 kg) (10/20 office wt)  Ideal Body Weight (Kg) (Calculated): 55 kg (120 lbs), 78.3 %  Usual Body Wt: 136 lb (61.7 kg) (10/19/21 office visit), Percent weight change: -30.9       Edema:    BMI Category Underweight (BMI less than 18.5)  Estimated Daily Nutrient Needs:  Energy (kcal/day): 6043-3994 (Kcal/kg (35-40) Weight used: 42.6 kg Current  Protein (g/day): 64-85 (1.2-5 g/kg) Weight Used: (Current) 42.6 kg  Fluid (ml/day):   (1 ml/kcal)    Nutrition Diagnosis:   Inadequate oral intake related to swallowing difficulty as evidenced by  (reported barrier to PO, recall, wt loss)  Severe malnutrition related to inadequate protein-energy intake as evidenced by Criteria as identified in malnutrition assessment  Nutrition Interventions:   Food and/or Nutrient Delivery: Continue Current Diet, Start Oral Nutrition Supplement     Coordination of Nutrition Care: Continue to monitor while inpatient       Goals:       Active Goal: Meet at least 75% of estimated needs, by next RD assessment       Nutrition Monitoring and Evaluation:      Food/Nutrient Intake Outcomes: Diet Advancement/Tolerance, Food and Nutrient Intake, Supplement Intake  Physical Signs/Symptoms Outcomes: Meal Time Behavior, Weight    Discharge Planning:    Continue Oral Nutrition Supplement    GIGI GOODRICH, RD

## 2022-10-22 LAB
ALBUMIN SERPL-MCNC: 2.5 G/DL (ref 3.5–5)
ALBUMIN/GLOB SERPL: 1 {RATIO} (ref 0.4–1.6)
ALP SERPL-CCNC: 65 U/L (ref 50–136)
ALT SERPL-CCNC: 9 U/L (ref 12–65)
ANION GAP SERPL CALC-SCNC: 4 MMOL/L (ref 2–11)
AST SERPL-CCNC: 14 U/L (ref 15–37)
BASOPHILS # BLD: 0 K/UL (ref 0–0.2)
BASOPHILS NFR BLD: 0 % (ref 0–2)
BILIRUB SERPL-MCNC: 0.3 MG/DL (ref 0.2–1.1)
BUN SERPL-MCNC: 6 MG/DL (ref 6–23)
CALCIUM SERPL-MCNC: 7.8 MG/DL (ref 8.3–10.4)
CHLORIDE SERPL-SCNC: 108 MMOL/L (ref 101–110)
CO2 SERPL-SCNC: 25 MMOL/L (ref 21–32)
CREAT SERPL-MCNC: 0.3 MG/DL (ref 0.6–1)
DIFFERENTIAL METHOD BLD: ABNORMAL
EOSINOPHIL # BLD: 0 K/UL (ref 0–0.8)
EOSINOPHIL NFR BLD: 1 % (ref 0.5–7.8)
ERYTHROCYTE [DISTWIDTH] IN BLOOD BY AUTOMATED COUNT: 17.3 % (ref 11.9–14.6)
GLOBULIN SER CALC-MCNC: 2.6 G/DL (ref 2.8–4.5)
GLUCOSE SERPL-MCNC: 88 MG/DL (ref 65–100)
HCT VFR BLD AUTO: 23 % (ref 35.8–46.3)
HGB BLD-MCNC: 7 G/DL (ref 11.7–15.4)
HISTORY CHECK: NORMAL
IMM GRANULOCYTES # BLD AUTO: 0 K/UL (ref 0–0.5)
IMM GRANULOCYTES NFR BLD AUTO: 1 % (ref 0–5)
LYMPHOCYTES # BLD: 0.7 K/UL (ref 0.5–4.6)
LYMPHOCYTES NFR BLD: 19 % (ref 13–44)
MAGNESIUM SERPL-MCNC: 1.7 MG/DL (ref 1.8–2.4)
MCH RBC QN AUTO: 27.9 PG (ref 26.1–32.9)
MCHC RBC AUTO-ENTMCNC: 30.4 G/DL (ref 31.4–35)
MCV RBC AUTO: 91.6 FL (ref 82–102)
MONOCYTES # BLD: 0.2 K/UL (ref 0.1–1.3)
MONOCYTES NFR BLD: 5 % (ref 4–12)
NEUTS SEG # BLD: 2.8 K/UL (ref 1.7–8.2)
NEUTS SEG NFR BLD: 74 % (ref 43–78)
NRBC # BLD: 0.03 K/UL (ref 0–0.2)
PLATELET # BLD AUTO: 182 K/UL (ref 150–450)
PMV BLD AUTO: 10.4 FL (ref 9.4–12.3)
POTASSIUM SERPL-SCNC: 4 MMOL/L (ref 3.5–5.1)
PROT SERPL-MCNC: 5.1 G/DL (ref 6.3–8.2)
RBC # BLD AUTO: 2.51 M/UL (ref 4.05–5.2)
SODIUM SERPL-SCNC: 137 MMOL/L (ref 133–143)
WBC # BLD AUTO: 3.7 K/UL (ref 4.3–11.1)

## 2022-10-22 PROCEDURE — 92610 EVALUATE SWALLOWING FUNCTION: CPT

## 2022-10-22 PROCEDURE — 1100000003 HC PRIVATE W/ TELEMETRY

## 2022-10-22 PROCEDURE — 2580000003 HC RX 258: Performed by: NURSE PRACTITIONER

## 2022-10-22 PROCEDURE — 86901 BLOOD TYPING SEROLOGIC RH(D): CPT

## 2022-10-22 PROCEDURE — 85025 COMPLETE CBC W/AUTO DIFF WBC: CPT

## 2022-10-22 PROCEDURE — 6360000002 HC RX W HCPCS: Performed by: INTERNAL MEDICINE

## 2022-10-22 PROCEDURE — 86644 CMV ANTIBODY: CPT

## 2022-10-22 PROCEDURE — 36591 DRAW BLOOD OFF VENOUS DEVICE: CPT

## 2022-10-22 PROCEDURE — 86923 COMPATIBILITY TEST ELECTRIC: CPT

## 2022-10-22 PROCEDURE — 2500000003 HC RX 250 WO HCPCS: Performed by: INTERNAL MEDICINE

## 2022-10-22 PROCEDURE — 3E0336Z INTRODUCTION OF NUTRITIONAL SUBSTANCE INTO PERIPHERAL VEIN, PERCUTANEOUS APPROACH: ICD-10-PCS | Performed by: INTERNAL MEDICINE

## 2022-10-22 PROCEDURE — 2580000003 HC RX 258: Performed by: FAMILY MEDICINE

## 2022-10-22 PROCEDURE — 6360000002 HC RX W HCPCS: Performed by: NURSE PRACTITIONER

## 2022-10-22 PROCEDURE — 1100000000 HC RM PRIVATE

## 2022-10-22 PROCEDURE — 6370000000 HC RX 637 (ALT 250 FOR IP): Performed by: FAMILY MEDICINE

## 2022-10-22 PROCEDURE — 30233N1 TRANSFUSION OF NONAUTOLOGOUS RED BLOOD CELLS INTO PERIPHERAL VEIN, PERCUTANEOUS APPROACH: ICD-10-PCS | Performed by: INTERNAL MEDICINE

## 2022-10-22 PROCEDURE — C9113 INJ PANTOPRAZOLE SODIUM, VIA: HCPCS | Performed by: NURSE PRACTITIONER

## 2022-10-22 PROCEDURE — 99232 SBSQ HOSP IP/OBS MODERATE 35: CPT | Performed by: INTERNAL MEDICINE

## 2022-10-22 PROCEDURE — APPSS30 APP SPLIT SHARED TIME 16-30 MINUTES: Performed by: NURSE PRACTITIONER

## 2022-10-22 PROCEDURE — 6360000002 HC RX W HCPCS: Performed by: FAMILY MEDICINE

## 2022-10-22 PROCEDURE — 83735 ASSAY OF MAGNESIUM: CPT

## 2022-10-22 PROCEDURE — A4216 STERILE WATER/SALINE, 10 ML: HCPCS | Performed by: NURSE PRACTITIONER

## 2022-10-22 PROCEDURE — 80053 COMPREHEN METABOLIC PANEL: CPT

## 2022-10-22 PROCEDURE — P9040 RBC LEUKOREDUCED IRRADIATED: HCPCS

## 2022-10-22 PROCEDURE — 36430 TRANSFUSION BLD/BLD COMPNT: CPT

## 2022-10-22 RX ORDER — SODIUM CHLORIDE 9 MG/ML
INJECTION, SOLUTION INTRAVENOUS PRN
Status: DISCONTINUED | OUTPATIENT
Start: 2022-10-22 | End: 2022-10-26 | Stop reason: HOSPADM

## 2022-10-22 RX ORDER — MAGNESIUM SULFATE IN WATER 40 MG/ML
2000 INJECTION, SOLUTION INTRAVENOUS ONCE
Status: COMPLETED | OUTPATIENT
Start: 2022-10-22 | End: 2022-10-22

## 2022-10-22 RX ORDER — DIPHENHYDRAMINE HYDROCHLORIDE 50 MG/ML
25 INJECTION INTRAMUSCULAR; INTRAVENOUS ONCE
Status: COMPLETED | OUTPATIENT
Start: 2022-10-22 | End: 2022-10-22

## 2022-10-22 RX ADMIN — POTASSIUM CHLORIDE AND SODIUM CHLORIDE: 900; 300 INJECTION, SOLUTION INTRAVENOUS at 04:58

## 2022-10-22 RX ADMIN — METOCLOPRAMIDE HYDROCHLORIDE 5 MG: 5 INJECTION INTRAMUSCULAR; INTRAVENOUS at 14:44

## 2022-10-22 RX ADMIN — HYDROMORPHONE HYDROCHLORIDE 0.5 MG: 1 INJECTION, SOLUTION INTRAMUSCULAR; INTRAVENOUS; SUBCUTANEOUS at 18:43

## 2022-10-22 RX ADMIN — MAGNESIUM SULFATE HEPTAHYDRATE 2000 MG: 40 INJECTION, SOLUTION INTRAVENOUS at 07:41

## 2022-10-22 RX ADMIN — ONDANSETRON 4 MG: 2 INJECTION INTRAMUSCULAR; INTRAVENOUS at 10:22

## 2022-10-22 RX ADMIN — APIXABAN 5 MG: 5 TABLET, FILM COATED ORAL at 20:36

## 2022-10-22 RX ADMIN — SOYBEAN OIL 250 ML: 20 INJECTION, SOLUTION INTRAVENOUS at 17:57

## 2022-10-22 RX ADMIN — ONDANSETRON 4 MG: 2 INJECTION INTRAMUSCULAR; INTRAVENOUS at 04:08

## 2022-10-22 RX ADMIN — SODIUM CHLORIDE, PRESERVATIVE FREE 10 ML: 5 INJECTION INTRAVENOUS at 21:14

## 2022-10-22 RX ADMIN — METOCLOPRAMIDE HYDROCHLORIDE 5 MG: 5 INJECTION INTRAMUSCULAR; INTRAVENOUS at 20:24

## 2022-10-22 RX ADMIN — PANTOPRAZOLE SODIUM 40 MG: 40 INJECTION, POWDER, LYOPHILIZED, FOR SOLUTION INTRAVENOUS at 07:38

## 2022-10-22 RX ADMIN — CEFTRIAXONE 1000 MG: 1 INJECTION, POWDER, FOR SOLUTION INTRAMUSCULAR; INTRAVENOUS at 13:23

## 2022-10-22 RX ADMIN — ONDANSETRON 4 MG: 2 INJECTION INTRAMUSCULAR; INTRAVENOUS at 17:12

## 2022-10-22 RX ADMIN — POTASSIUM CHLORIDE: 2 INJECTION, SOLUTION, CONCENTRATE INTRAVENOUS at 17:57

## 2022-10-22 RX ADMIN — METOCLOPRAMIDE HYDROCHLORIDE 5 MG: 5 INJECTION INTRAMUSCULAR; INTRAVENOUS at 04:07

## 2022-10-22 RX ADMIN — PANTOPRAZOLE SODIUM 40 MG: 40 INJECTION, POWDER, LYOPHILIZED, FOR SOLUTION INTRAVENOUS at 20:24

## 2022-10-22 RX ADMIN — SODIUM CHLORIDE, PRESERVATIVE FREE 10 ML: 5 INJECTION INTRAVENOUS at 08:28

## 2022-10-22 RX ADMIN — HYDROMORPHONE HYDROCHLORIDE 0.5 MG: 1 INJECTION, SOLUTION INTRAMUSCULAR; INTRAVENOUS; SUBCUTANEOUS at 06:00

## 2022-10-22 RX ADMIN — HYDROMORPHONE HYDROCHLORIDE 0.5 MG: 1 INJECTION, SOLUTION INTRAMUSCULAR; INTRAVENOUS; SUBCUTANEOUS at 01:22

## 2022-10-22 RX ADMIN — HYDROMORPHONE HYDROCHLORIDE 0.5 MG: 1 INJECTION, SOLUTION INTRAMUSCULAR; INTRAVENOUS; SUBCUTANEOUS at 10:22

## 2022-10-22 RX ADMIN — HYDROMORPHONE HYDROCHLORIDE 0.5 MG: 1 INJECTION, SOLUTION INTRAMUSCULAR; INTRAVENOUS; SUBCUTANEOUS at 14:45

## 2022-10-22 RX ADMIN — DIPHENHYDRAMINE HYDROCHLORIDE 25 MG: 50 INJECTION, SOLUTION INTRAMUSCULAR; INTRAVENOUS at 09:43

## 2022-10-22 ASSESSMENT — PAIN SCALES - GENERAL
PAINLEVEL_OUTOF10: 7
PAINLEVEL_OUTOF10: 8
PAINLEVEL_OUTOF10: 0
PAINLEVEL_OUTOF10: 3
PAINLEVEL_OUTOF10: 3
PAINLEVEL_OUTOF10: 6
PAINLEVEL_OUTOF10: 8
PAINLEVEL_OUTOF10: 7
PAINLEVEL_OUTOF10: 0

## 2022-10-22 ASSESSMENT — PAIN DESCRIPTION - DESCRIPTORS
DESCRIPTORS: ACHING;DISCOMFORT
DESCRIPTORS: DISCOMFORT
DESCRIPTORS: DISCOMFORT
DESCRIPTORS: ACHING;DISCOMFORT
DESCRIPTORS: ACHING;DISCOMFORT;CRAMPING

## 2022-10-22 ASSESSMENT — PAIN DESCRIPTION - ORIENTATION
ORIENTATION: MID

## 2022-10-22 ASSESSMENT — PAIN - FUNCTIONAL ASSESSMENT
PAIN_FUNCTIONAL_ASSESSMENT: ACTIVITIES ARE NOT PREVENTED
PAIN_FUNCTIONAL_ASSESSMENT: PREVENTS OR INTERFERES SOME ACTIVE ACTIVITIES AND ADLS

## 2022-10-22 ASSESSMENT — PAIN DESCRIPTION - PAIN TYPE
TYPE: ACUTE PAIN

## 2022-10-22 ASSESSMENT — PAIN DESCRIPTION - FREQUENCY
FREQUENCY: CONTINUOUS

## 2022-10-22 ASSESSMENT — PAIN DESCRIPTION - LOCATION
LOCATION: ABDOMEN

## 2022-10-22 ASSESSMENT — PAIN DESCRIPTION - ONSET
ONSET: GRADUAL

## 2022-10-22 NOTE — PROGRESS NOTES
LTG: patient will tolerate safest, least restrictive oral diet without s/sx airway compromise      SPEECH LANGUAGE PATHOLOGY: DYSPHAGIA  Initial Assessment and Discharge    NAME: Cristiana Michaud  : 1975  MRN: [id number]    ADMISSION DATE: 10/20/2022  PRIMARY DIAGNOSIS: Intractable nausea and vomiting  Chronic abdominal pain [R10.9, G89.29]  Metastatic carcinoma (HCC) [C79.9]  Intractable nausea and vomiting [R11.2]  Dysphagia, unspecified type [R13.10]    ICD-10: Treatment Diagnosis: R13.19 Other Dysphagia    RECOMMENDATIONS   Diet:  Diet Solids Recommendation: Other (comment) (Follow GI recommendations)       Medications:              Compensatory Swallowing Strategies: Remain upright for 30-45 minutes after meals;Upright as possible for all oral intake   Patient continues to require skilled intervention: No        ASSESSMENT    Dysphagia Diagnosis: Swallow function appears [facility]  Patient with no oropharyngeal dysphagia noted at bedside. Patient with esophageal dysfunction and dysphagia. GI is addressing. GENERAL    History of Present Injury/Illness: Ms. Dwayne Gerard  has a past medical history of Anemia, Colon cancer ([facility] [address] COVID-19, GERD (gastroesophageal reflux disease), History of colonoscopy, Hx of blood clots, Hypotension, Obstruction of fallopian tube, Peritoneal carcinomatosis ([address] and Weight loss. . She also  has a past surgical history that includes Upper gastrointestinal endoscopy; Urological Surgery (Bilateral, 03/15/2022);  section (); Gastric bypass surgery (2014); Tonsillectomy; IR PORT PLACEMENT > 5 YEARS (2022); Hysterectomy; myomectomy (age \"early 21s\"); Hysterectomy (2020); IR PORT PLACEMENT > 5 YEARS (2022); Cystoscopy (Bilateral, 2022); Cystoscopy (Bilateral, 2022); laparotomy (N/A, 2022); Upper gastrointestinal endoscopy (N/A, 2022);  IR GUIDED PERC PLEURAL DRAIN W CATH INSERT (2022); and Cystoscopy (Bilateral, 10/6/2022). Chart Reviewed: Yes  General Comment  Comments: Patient with nausea and vomiting and EGD with dilation completed yesterday  Subjective: I don't have any trouble swallowing it is just getting it to go down and stay down. Behavior/Cognition: Alert; Cooperative  Communication Observation: Functional  Follows Directions: Complex          Current Diet :  (Full liquid secondary to GI)  Current Liquid Diet : Thin    Respiratory Status: Room air     O2 Device: None (Room air)        Pain:                       Vision: Within Functional Limits            Hearing: Within functional limits       OBJECTIVE    Patient Positioning: Upright in bed   Oral Motor   Labial: No impairment  Dentition: Intact; Natural  Oral Hygiene: Moist;Clean  Lingual: No impairment  Mandible: No impairment  Dentition: Adequate     Volitional Cough: Strong  Baseline Vocal Quality: Normal    Oropharyngeal Phase: Thin - cup; Thin - straw              Pharyngeal Phase: WFL no signs of aspiraiton  PLAN    Duration/Frequency: No treatment indicated at this time    Dysphagia Outcome and Severity Scale (ALEXANDRU)  Dysphagia Outcome Severity Scale: Level 7: Normal in all situations  Interpretation of Tool: The Dysphagia Outcome and Severity Scale (ALEXANDRU) is a simple, easy-to-use, 7-point scale developed to systematically rate the functional severity of dysphagia based on objective assessment and make recommendations for diet level, independence level, and type of nutrition. Normal(7), Functional(6), Mild(5), Mild-Moderate(4), Moderate(3), Moderate-Severe(2), Severe(1)    Speech Therapy Prognosis  Prognosis Considerations: Age    Education: RN, Patient  Patient Education: Discussed with patient SLP role and assessing oral and pharyngeal phase of swallow. Patient with no oropharyngeal dysfunction  Patient Education Response: Verbalizes understanding    PRECAUTIONS/ALLERGIES: Patient has no known allergies.         Therapy Time  SLP Individual Minutes  Time In: 9057  Time Out: 8465  Minutes: 2000 [address]  10/22/2022 2:21 PM

## 2022-10-22 NOTE — CONSULTS
Comprehensive Nutrition Assessment    Type and Reason for Visit: Reassess, Consult  TPN Management (Oncology) and Poor Intake/Appetite 5 or More Days (Hospitalists)    Nutrition Recommendations/Plan:   Meals and Snacks:  Diet: Continue current diet and advance as medically appropriate  Nutrition Supplement Therapy:  Medical food supplement therapy:  Continue Ensure Clear three times per day (this provides 240 kcal and 8 grams protein per bottle)  Discontinue Ensure High Protein  Parenteral Nutrition:  Central parenteral nutrition  begins at 1800 today  Initiate: Dex 5%, 4.25% AA 1.8 L (75ml/hr)   Defer 250 ml 20% lipids daily  To provide: 612 kcal/d (41% of needs), 77 grams of protein/d (100% of needs), 90 grams of CHO/d and 1800 ml of total volume/d. Lytes/L:   Sodium ( 150 meq NaCl), Potassium ( 15 meq KCl, contribution from kphos only) Phosphorus ( 15 mmol KPO4), Calcium (4.5 meq), Magnesium 8 meq   Other additives:   MVI Monday Wednesday Friday due to [employer], [employer]  Nutrition Related Medication Management: Thiamine  mg x 7 days (day 1 or 7 days)  IVF:  Discontinue at 1800  Labs:   CMP daily per MD  Mg daily per MD  Phos daily x 3 days then MWF    Triglyceride tomorrow  POC Glucoses/SSI Not indicated     Malnutrition Assessment:  Malnutrition Status: Severe malnutrition  Context: Chronic Illness  Findings of clinical characteristics of malnutrition:   Energy Intake:  Mild decrease in energy intake (Comment) (unable to tolerate even water for unknown time frame)  Weight Loss:  Greater than 20% over 1 year (42# (30.9%))     Body Fat Loss:  Severe body fat loss Triceps, Fat Overlying Ribs   Muscle Mass Loss:  Severe muscle mass loss Temples (temporalis), Clavicles (pectoralis & deltoids), Scapula (trapezius)  Fluid Accumulation:  No significant fluid accumulation     Strength:  Not Performed     Nutrition Assessment:  Nutrition History: Patient known to RD from recent admission.  She was eating normall prior to recent admission. Usual intake was 3 meals per day. She has historically not tolerated Ensure/Boost products. PO intake last admission was 1-25% of meals but did drink Ensure Clear well. Today she states that she has been having difficulty swallowing and everything she tried to eat or drink has been coming back up. She states that is progressively got worse until she was unable to tolerate even water. Do You Have Any Cultural, Advent, or Ethnic Food Preferences?: Yes (Comment)   Nutrition Background:       Patient with PMH significant for gastric sleeve, hydronephrosis with stents and recent right stent exchange (left unable to be exchanged due to tumor), metastatic adeno with suspected GI origin with peritoneal carcinomatosis on diagnosis, debulking unsuccessful, recent GIB, DVT, Pleurx placement 9/26/22. She presented with abdominal pain, difficulty swallowing \"stuck in throat and vomits. \" She was supposed to have outpt EGD but presented to ER with intractable nausea and vomiting. She is now s/p EGD 10/21 with findings of LA grade D esophagitis, proximal esophageal stricture, Sckatzki's ring. Nutrition Interval:  Patient seen sitting up in bed with RN, Jacob Sheets, at bedside. Discussed initiating TPN and patient with appropriate questions which were answered. She has been tolerating Ensure Clear but states that Ensure High Protein upsets her stomach. She denies any needs. Patient is high risk for refeeding, thus will initiate dilute solution. Right single lumen port in place.     Abdominal Status (last documented):   Last BM (including prior to admit): 10/20/22, GI Symptoms: Constipation, Loss of appetite, Nausea   Pertinent Medications: Rocephin, Reglan, Zofran PRN (utilizing daily), Protonix  IVF: NS with 40 meq Kcl @ 75 ml/hr  Electrolyte replacements: 2 gm Mag this am  Pertinent Labs:   Lab Results   Component Value Date/Time     10/22/2022 03:25 AM    K 4.0 10/22/2022 03:25 AM  10/22/2022 03:25 AM    CO2 25 10/22/2022 03:25 AM    BUN 6 10/22/2022 03:25 AM    CREATININE 0.30 10/22/2022 03:25 AM    GLUCOSE 88 10/22/2022 03:25 AM    CALCIUM 7.8 10/22/2022 03:25 AM    PHOS 3.7 08/21/2022 03:36 AM    MG 1.7 10/22/2022 03:25 AM      Remarkable for: depleted Mg - replaced as above    Current Nutrition Therapies:  ADULT DIET; Full Liquid  ADULT ORAL NUTRITION SUPPLEMENT; Breakfast, Lunch, Dinner; Clear Liquid Oral Supplement  ADULT ORAL NUTRITION SUPPLEMENT; Lunch, Dinner; Low Calorie/High Protein Oral Supplement    Current Intake:   Average Meal Intake:  (bites and sips) Average Supplements Intake: % (of Ensure Clear, does not tolerate Ensure High Protein)      Anthropometric Measures:  Height: 5' 4\" (162.6 cm)  Current Body Wt: 94 lb (42.6 kg) (10/21), Weight source: Stated  BMI: 16.1, Underweight (BMI less than 18.5)  Admission Body Weight: 92 lb (41.7 kg) (10/20 office wt)  Ideal Body Weight (Kg) (Calculated): 55 kg (120 lbs), 78.3 %  Usual Body Wt: 136 lb (61.7 kg) (10/19/21 office visit), Percent weight change: -30.9       Edema:    BMI Category Underweight (BMI less than 18.5)  Estimated Daily Nutrient Needs:  Energy (kcal/day): 9485-4219 (Kcal/kg (35-40) Weight used: 42.6 kg Current  Protein (g/day): 64-85 (1.2-5 g/kg) Weight Used: (Current) 42.6 kg  Fluid (ml/day):   (1 ml/kcal)    Nutrition Diagnosis:   Inadequate oral intake related to swallowing difficulty as evidenced by  (reported barrier to PO, intake as above, erosive esophagitis)  Severe malnutrition related to inadequate protein-energy intake as evidenced by Criteria as identified in malnutrition assessment  Nutrition Interventions:   Food and/or Nutrient Delivery: Continue Current Diet, Continue Oral Nutrition Supplement, Discontinue Oral Nutrition Supplement, Start Parenteral Nutrition     Coordination of Nutrition Care: Continue to monitor while inpatient       Goals:       Active Goal: Tolerate nutrition support at goal rate, within 7 days       Nutrition Monitoring and Evaluation:      Food/Nutrient Intake Outcomes: Food and Nutrient Intake, Supplement Intake, Parenteral Nutrition Intake/Tolerance  Physical Signs/Symptoms Outcomes: Biochemical Data, GI Status, Fluid Status or Edema, Meal Time Behavior, Weight    Discharge Planning:     Too soon to determine    Mariano Rizvi

## 2022-10-22 NOTE — PLAN OF CARE
Problem: Skin/Tissue Integrity  Goal: Absence of new skin breakdown  Description: 1. Monitor for areas of redness and/or skin breakdown  2. Assess vascular access sites hourly  3. Every 4-6 hours minimum:  Change oxygen saturation probe site  4. Every 4-6 hours:  If on nasal continuous positive airway pressure, respiratory therapy assess nares and determine need for appliance change or resting period.   Outcome: Progressing     Problem: Safety - Adult  Goal: Free from fall injury  Outcome: Progressing  Flowsheets (Taken 10/22/2022 4935)  Free From Fall Injury: Instruct family/caregiver on patient safety     Problem: ABCDS Injury Assessment  Goal: Absence of physical injury  Outcome: Progressing

## 2022-10-22 NOTE — PROGRESS NOTES
[facility]        Inpatient Hematology / Oncology Progress Note    Reason for Admission:  Chronic abdominal pain [R10.9, G89.29]  Metastatic carcinoma (HCC) [C79.9]  Intractable nausea and vomiting [R11.2]  Dysphagia, unspecified type [R13.10]    24 Hour Events:  Afebrile, VSS  S/p EGD yesterday with erosive esophagitis, esophageal stricture, and Schatzki ring  UA c/w UTI, on CTX  Starting TPN today    Transfusions: 1 unit PRBCs  Replacements: Mg+      ROS:  Constitutional: Negative for fever, chills. CV: Negative for chest pain, palpitations, edema. Respiratory: Negative for dyspnea, cough, wheezing. GI: Negative for abdominal pain, diarrhea. 10 point review of systems is otherwise negative with the exception of the elements mentioned above in the HPI.        No Known Allergies  Past Medical History:   Diagnosis Date    Anemia     -- not a problem since hyst    Colon cancer ([address] dx 2022     plans for chemo--- followed by dr Lou EL-19 2020    no hospitalization    GERD (gastroesophageal reflux disease)     managed with med    History of colonoscopy 2018    Dr. Gwen Becerra, nl (see media note), R     Hx of blood clots 2022    per pt \"small clot on lung identified by CT scan\"  CT Scan impression:- Nonobstructive pulmonary filling defect involving Left Lower Lobe     Hypotension     asymptomatic    Obstruction of fallopian tube     per pt has \"1 good tube\"    Peritoneal carcinomatosis ([address]     Weight loss     80lbs weight loss after gastric sleeve     Past Surgical History:   Procedure Laterality Date     SECTION      CYSTOSCOPY Bilateral 2022    CYSTOSCOPY BILATERAL RETROGRADE PYELOGRAM performed by Xiao Bush MD at [address] Bilateral 2022    BILATERAL CYSTOSCOPY URETERAL STENT EXCHANGE performed by Xiao Bush MD at [address] Bilateral 10/6/2022    CYSTOSCOPY BILATERAL URETERAL STENT REMOVAL, RIGHT URETERAL STENT PLACEMENT performed by Juan Villatoro MD at [facility] MAIN OR    GASTRIC BYPASS SURGERY  07/23/2014    gastric sleeve- Choudhari    HYSTERECTOMY (CERVIX STATUS UNKNOWN)      2018    HYSTERECTOMY (CERVIX STATUS UNKNOWN)  07/09/2020    TLH w/ Bilateral salpingectomy and left oophorectomy    IR PERC CATH PLEURAL DRAIN W/IMAG  9/26/2022    IR PERC CATH PLEURAL DRAIN W/IMAG 9/26/2022 [facility]    IR PORT PLACEMENT EQUAL OR GREATER THAN 5 YEARS  2/28/2022    IR PORT PLACEMENT EQUAL OR GREATER THAN 5 YEARS  2/28/2022    IR PORT PLACEMENT EQUAL OR GREATER THAN 5 YEARS 2/28/2022 [facility] SPECI[facility]    LAPAROTOMY N/A 8/17/2022    EXPLORATORY LAPAROTOMY/ ENTEROENTEROSTOMY performed by Rhoda Collazo MD at [facility]. [address]  age Ruthanna Ruffing 21s\"    also \"unblocked her FT\"    TONSILLECTOMY      UPPER GASTROINTESTINAL ENDOSCOPY      with dilation    UPPER GASTROINTESTINAL ENDOSCOPY N/A 9/13/2022    EGD ESOPHAGOGASTRODUODENOSCOPY OFL 17 To IR after recovery for Para performed by Magdalene Helms MD at [facility] ENDOSCOPY    UROLOGICAL SURGERY Bilateral 03/15/2022    cysto     Family History   Problem Relation Age of Onset    Heart Disease Maternal Grandmother     Hypertension Maternal Grandmother     Heart Disease Maternal Grandfather     Hypertension Maternal Grandfather     Pulmonary Embolism Neg Hx     Colon Cancer Neg Hx     Deep Vein Thrombosis Neg Hx     Ovarian Cancer Neg Hx     Prostate Cancer Neg Hx     Breast Cancer Neg Hx      Social History     Socioeconomic History    Marital status: Single     Spouse name: Not on file    Number of children: Not on file    Years of education: Not on file    Highest education level: Not on file   Occupational History    Not on file   Tobacco Use    Smoking status: Never    Smokeless tobacco: Never   Vaping Use    Vaping Use: Never used   Substance and Sexual Activity    Alcohol use: Not Currently    Drug use: No    Sexual activity: Not on file   Other Topics Concern    Not on file   Social History Narrative    1. Use large speculum. 2.  sister, Darrell Sheridan #[id number] and mom is Joshua Villalobos #[id number] (both Kofoed's pts)  3.   PCP  Dr. Марина Natarajan ([facility]), GI Dr. [place] normal EGD     Social Determinants of Health     Financial Resource Strain: Not on file   Food Insecurity: Not on file   Transportation Needs: Not on file   Physical Activity: Not on file   Stress: Not on file   Social Connections: Not on file   Intimate Partner Violence: Not on file   Housing Stability: Not on file     Current Facility-Administered Medications   Medication Dose Route Frequency Provider Last Rate Last Admin    0.9 % sodium chloride infusion   IntraVENous PRN Deepak Burks MD        cefTRIAXone (ROCEPHIN) 1,000 mg in sodium chloride 0.9 % 50 mL IVPB mini-bag  1,000 mg IntraVENous Q24H Conda Paulette, APRN - CNP   Stopped at 10/21/22 1252    HYDROcodone-acetaminophen (NORCO) 5-325 MG per tablet 1 tablet  1 tablet Oral Q6H PRN Conda Paulette, APRN - CNP        pantoprazole (PROTONIX) 40 mg in sodium chloride (PF) 10 mL injection  40 mg IntraVENous BID Melany Fregoso, APRN - NP   40 mg at 10/22/22 0738    0.9% NaCl with KCl 40 mEq infusion   IntraVENous Continuous Waterford Sonal, DO 75 mL/hr at 10/22/22 0458 New Bag at 10/22/22 0458    sodium chloride flush 0.9 % injection 5-40 mL  5-40 mL IntraVENous 2 times per day Pardeep Sonla, DO   10 mL at 10/22/22 4179    sodium chloride flush 0.9 % injection 5-40 mL  5-40 mL IntraVENous PRN Waterford Sonal, DO        0.9 % sodium chloride infusion   IntraVENous PRN Waterford Sonal, DO        ondansetron (ZOFRAN-ODT) disintegrating tablet 4 mg  4 mg Oral Q8H PRN Pardeep Sonal, DO        Or    ondansetron (ZOFRAN) injection 4 mg  4 mg IntraVENous Q6H PRN Waterford Sonal, DO   4 mg at 10/22/22 0408    acetaminophen (TYLENOL) tablet 650 mg  650 mg Oral Q6H PRN Pardeep Sonal, DO        Or    acetaminophen (TYLENOL) suppository 650 mg  650 mg Rectal Q6H PRN Gabrielle Barney, DO        polyethylene glycol (GLYCOLAX) packet 17 g  17 g Oral Daily PRN Gabrielle Barney, DO        hyoscyamine (LEVSIN/SL) sublingual tablet 125 mcg  125 mcg SubLINGual Q4H PRN Gabrielle Barney, DO        glucose chewable tablet 16 g  4 tablet Oral PRN Gabrielle Barney, DO        dextrose bolus 10% 125 mL  125 mL IntraVENous PRN Gabrielle Barney, DO        Or    dextrose bolus 10% 250 mL  250 mL IntraVENous PRN Gabrielle Barney, DO        glucagon (rDNA) injection 1 mg  1 mg SubCUTAneous PRN Gabrielle Barney, DO        dextrose 10 % infusion   IntraVENous Continuous PRN Gabrielle Barney, DO        HYDROmorphone HCl PF (DILAUDID) injection 0.5 mg  0.5 mg IntraVENous Q4H PRN Gabrielle Barney, DO   0.5 mg at 10/22/22 0600    metoclopramide (REGLAN) injection 5 mg  5 mg IntraVENous q8h Gabrielle Barney, DO   5 mg at 10/22/22 0407    [Held by provider] apixaban (ELIQUIS) tablet 5 mg  5 mg Oral BID Gabrielle Barney, DO           OBJECTIVE:  Patient Vitals for the past 8 hrs:   BP Temp Temp src Pulse Resp SpO2   10/22/22 1015 116/82 -- -- 88 16 99 %   10/22/22 1000 102/80 97.5 °F (36.4 °C) Axillary 93 16 99 %   10/22/22 0818 115/74 97.4 °F (36.3 °C) Axillary 78 18 98 %   10/22/22 0630 -- -- -- -- 17 --   10/22/22 0600 -- -- -- -- 18 --   10/22/22 0315 110/74 98.2 °F (36.8 °C) Oral 86 17 96 %     Temp (24hrs), Av.8 °F (36.6 °C), Min:97.2 °F (36.2 °C), Max:98.6 °F (37 °C)    10/22 07 - 10/22 1900  In: 329 [P.O.:120; I.V.:209]  Out: -     Physical Exam:  Constitutional: Chronically ill-appearing female in no acute distress, sitting comfortably in the hospital bed. HEENT: Normocephalic and atraumatic. Oropharynx is clear, mucous membranes are moist.  Extraocular muscles are intact. Sclerae anicteric. Neck supple without JVD. No thyromegaly present. Skin Warm and dry. No bruising and no rash noted. No erythema. No pallor.     Respiratory Lungs are clear to auscultation bilaterally without wheezes, rales or rhonchi, normal air exchange without accessory muscle use. CVS Normal rate, regular rhythm and normal S1 and S2. No murmurs, gallops, or rubs. Abdomen Soft, nontender and nondistended, normoactive bowel sounds. No palpable mass. No hepatosplenomegaly. Neuro Grossly nonfocal with no obvious sensory or motor deficits. MSK Normal range of motion in general.  No edema and no tenderness. Psych Appropriate mood and affect.         Labs:    Recent Results (from the past 24 hour(s))   POCT Glucose    Collection Time: 10/21/22 11:35 AM   Result Value Ref Range    POC Glucose 91 65 - 100 mg/dL    Performed by: Maurice    CBC with Auto Differential    Collection Time: 10/22/22  3:25 AM   Result Value Ref Range    WBC 3.7 (L) 4.3 - 11.1 K/uL    RBC 2.51 (L) 4.05 - 5.2 M/uL    Hemoglobin 7.0 (L) 11.7 - 15.4 g/dL    Hematocrit 23.0 (L) 35.8 - 46.3 %    MCV 91.6 82 - 102 FL    MCH 27.9 26.1 - 32.9 PG    MCHC 30.4 (L) 31.4 - 35.0 g/dL    RDW 17.3 (H) 11.9 - 14.6 %    Platelets 112 649 - 649 K/uL    MPV 10.4 9.4 - 12.3 FL    nRBC 0.03 0.0 - 0.2 K/uL    Differential Type AUTOMATED      Seg Neutrophils 74 43 - 78 %    Lymphocytes 19 13 - 44 %    Monocytes 5 4.0 - 12.0 %    Eosinophils % 1 0.5 - 7.8 %    Basophils 0 0.0 - 2.0 %    Immature Granulocytes 1 0.0 - 5.0 %    Segs Absolute 2.8 1.7 - 8.2 K/UL    Absolute Lymph # 0.7 0.5 - 4.6 K/UL    Absolute Mono # 0.2 0.1 - 1.3 K/UL    Absolute Eos # 0.0 0.0 - 0.8 K/UL    Basophils Absolute 0.0 0.0 - 0.2 K/UL    Absolute Immature Granulocyte 0.0 0.0 - 0.5 K/UL   Magnesium    Collection Time: 10/22/22  3:25 AM   Result Value Ref Range    Magnesium 1.7 (L) 1.8 - 2.4 mg/dL   Comprehensive Metabolic Panel    Collection Time: 10/22/22  3:25 AM   Result Value Ref Range    Sodium 137 133 - 143 mmol/L    Potassium 4.0 3.5 - 5.1 mmol/L    Chloride 108 101 - 110 mmol/L    CO2 25 21 - 32 mmol/L    Anion Gap 4 2 - 11 mmol/L    Glucose 88 65 - 100 mg/dL    BUN 6 6 - 23 MG/DL Creatinine 0.30 (L) 0.6 - 1.0 MG/DL    Est, Glom Filt Rate >60 >60 ml/min/1.73m2    Calcium 7.8 (L) 8.3 - 10.4 MG/DL    Total Bilirubin 0.3 0.2 - 1.1 MG/DL    ALT 9 (L) 12 - 65 U/L    AST 14 (L) 15 - 37 U/L    Alk Phosphatase 65 50 - 136 U/L    Total Protein 5.1 (L) 6.3 - 8.2 g/dL    Albumin 2.5 (L) 3.5 - 5.0 g/dL    Globulin 2.6 (L) 2.8 - 4.5 g/dL    Albumin/Globulin Ratio 1.0 0.4 - 1.6     TYPE AND SCREEN    Collection Time: 10/22/22  4:39 AM   Result Value Ref Range    Crossmatch expiration date 10/25/2022,2359     ABO/Rh A POSITIVE     Antibody Screen NEG     Unit Number [id number]     Product Code Blood Bank [facility]     Unit Divison 00     Dispense Status Blood Bank ISSUED     Crossmatch Result Compatible    PREPARE RBC (CROSSMATCH), 1 Units    Collection Time: 10/22/22  4:45 AM   Result Value Ref Range    History Check Historical check performed        Imaging:  Xray Result (most recent):  XR CHEST LIMITED ONE VIEW 10/20/2022    Narrative  Chest X-ray    INDICATION: Chest pain    AP/PA view of the chest was obtained. FINDINGS: The lungs are clear. There are no infiltrates or effusions. The heart  size is normal.  Vascular port is present. Impression  No acute findings in the chest    CT Result (most recent):  CT ABDOMEN PELVIS W IV CONTRAST 10/20/2022    Narrative  CT OF THE ABDOMEN AND PELVIS    INDICATION: Abdominal pain and nausea, history of peritoneal carcinomatosis. Multiple axial images were obtained through the abdomen and pelvis. Oral  contrast was used for bowel opacification. 100mL of Isovue 370 intravenous  contrast was used for better evaluation of solid organs and vascular structures. Radiation dose reduction techniques were used for this study. All CT scans  performed at this facility use one or all of the following: Automated exposure  control, adjustment of the mA and/or kVp according to patient's size, iterative  reconstruction.     COMPARISON: 10/03/2022    FINDINGS:  - LUNG BASES: Small left pleural effusion. Right lung base is clear.    - LIVER: Normal in size and appearance. - GALLBLADDER/BILE DUCTS: Stable mild bile duct dilatation.  - PANCREAS: Normal.  - SPLEEN: Small, mottled enhancement    - ADRENALS: Normal.  - KIDNEYS/URETERS: Right ureteral stent is in place. Decreased right  hydronephrosis. Left ureteral stent has been removed. Only mild residual  prominence of the left collecting system. - BLADDER: Distal end of the right ureteral stent is in the bladder. While  diffuse bladder wall thickening.  - REPRODUCTIVE ORGANS: Post hysterectomy.    - BOWEL: Mild diffuse bowel wall thickening, both small bowel and colon. Esophagus is distended. No bowel distention.  - LYMPH NODES: No significant retroperitoneal, mesenteric, or pelvic adenopathy.  - BONES: No fracture or significant bone lesion.  - VASCULATURE: Normal  - OTHER: Peritoneal nodularity is again noted. There is increased ascites. Percutaneous strain is present in the right abdomen. Impression  1. Peritoneal carcinomatosis and increased ascites. 2.  Persistent diffuse bowel wall thickening. 3.  Persistent but improved bilateral hydronephrosis. 4.  Diffuse bladder wall thickening.       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 ([address]    Bilateral hydronephrosis    Peritoneal carcinoma ([address]    Peritoneal carcinomatosis ([address]    Hematemesis    Current use of long term anticoagulation    Intractable vomiting    Generalized abdominal pain    LFT elevation Obstruction of both ureters    Abdominal pain    Hypokalemia    Hypomagnesemia    Hypoalbuminemia    GERD (gastroesophageal reflux disease)    Colitis    Intractable vomiting with nausea    Fever    CINV (chemotherapy-induced nausea and vomiting)    Intractable nausea and vomiting    Moderate protein-calorie malnutrition (HCC)    Dysphagia    Metastatic carcinoma [facility])     Ms. Dirk Poe is a 52 y.o. female admitted on 10/20/2022. The primary encounter diagnosis was Dysphagia, unspecified type. Diagnoses of Chronic abdominal pain and Metastatic carcinoma ([address] were also pertinent to this visit. Josué Floyd Her PMH includes sleeve gastrectomy, hydronephrosis s/p ureteral stents with recent R stent exchange with unsuccessful L exchange d/t tumor, recent GIB with neg EGD, recurrent ascites s/p pleurx drain 9/26, DVT on Eliquis, and hx esophageal strictures. She is a patient of Dr Lennox Lau with metastatic carcinoma of unknown pimary with known peritoneal carcinomatosis on diagnosis. She completed C9 FOLFOX/Avastin on 9/8/22. Treatment recently changed d/t POD. She is s/p C1D8 paclitaxel/Cyramza on 10/20. She has had multiple recent admissions for intractable nausea and vomiting, most recently from 10/3 - 10/7/22 for colitis vs serosal implants of tumor. She was given CTX/Flagyl and then changed to LVQ/Flagyl. She was discharged home on Flagyl/Cipro. She returned to ED on 10/20 with c/o nausea, vomiting, and abd pain. She reports difficulty swallowing foods and liquids. Feels like food gets stuck for the past 3 days, causing her to vomit any time she eats. CT AP with peritoneal carcinomatosis and increased ascites; persistent diffuse bowel wall thickening; persistent but improved b/l hydronephrosis; and diffuse bladder wall thickening. CXR neg. GI following, plans for EGD with possible dilation today. We were asked for recommendations for our patient.       PLAN:  Metastatic carcinoma of unknown primary / peritoneal carcinomatosis  - s/p FOLFOX/Avastin with POD  - s/p C1D8 paclitaxel/Cyramza on 10/20    Nausea / Vomiting / Dysphagia  - CT AP with peritoneal carcinomatosis and increased ascites; persistent diffuse bowel wall thickening; persistent but improved b/l hydronephrosis; and diffuse bladder wall thickening  - GI following, EGD with possible dilation today  - If no improvement after EGD, will consider TPN  - RD consulted  - Check UA   10/22 s/p EGD yesterday with erosive esophagitis, proximal esophageal stricture, and Schatzki ring. On PPI BID. Start TPN. UTI  - UA c/w UTI  - Ucx ordered  - Start CTX  10/22 Ucx pending. Con't CTX (D2). Hx DVT  - on Eliquis    Continue home meds  Andres SOPs  On Eliquis     Goals and plan of care reviewed with the patient. All questions answered to the best of our ability. Disposition:  TBD pending clinical course.             SCOTT Julienigen  Hematology & Oncology  [id number] [place] [address]  Office : [phone]  Fax : [phone]

## 2022-10-23 LAB
ABO + RH BLD: NORMAL
ALBUMIN SERPL-MCNC: 2.4 G/DL (ref 3.5–5)
ALBUMIN/GLOB SERPL: 0.8 {RATIO} (ref 0.4–1.6)
ALP SERPL-CCNC: 70 U/L (ref 50–136)
ALT SERPL-CCNC: 9 U/L (ref 12–65)
ANION GAP SERPL CALC-SCNC: 7 MMOL/L (ref 2–11)
AST SERPL-CCNC: 14 U/L (ref 15–37)
BASOPHILS # BLD: 0 K/UL (ref 0–0.2)
BASOPHILS NFR BLD: 0 % (ref 0–2)
BILIRUB SERPL-MCNC: 0.2 MG/DL (ref 0.2–1.1)
BLD PROD TYP BPU: NORMAL
BLOOD BANK DISPENSE STATUS: NORMAL
BLOOD GROUP ANTIBODIES SERPL: NORMAL
BPU ID: NORMAL
BUN SERPL-MCNC: 7 MG/DL (ref 6–23)
CALCIUM SERPL-MCNC: 7.7 MG/DL (ref 8.3–10.4)
CHLORIDE SERPL-SCNC: 108 MMOL/L (ref 101–110)
CO2 SERPL-SCNC: 23 MMOL/L (ref 21–32)
CREAT SERPL-MCNC: 0.4 MG/DL (ref 0.6–1)
CROSSMATCH RESULT: NORMAL
DIFFERENTIAL METHOD BLD: ABNORMAL
EOSINOPHIL # BLD: 0 K/UL (ref 0–0.8)
EOSINOPHIL NFR BLD: 1 % (ref 0.5–7.8)
ERYTHROCYTE [DISTWIDTH] IN BLOOD BY AUTOMATED COUNT: 19.5 % (ref 11.9–14.6)
GLOBULIN SER CALC-MCNC: 3 G/DL (ref 2.8–4.5)
GLUCOSE SERPL-MCNC: 115 MG/DL (ref 65–100)
HCT VFR BLD AUTO: 29.8 % (ref 35.8–46.3)
HGB BLD-MCNC: 9.4 G/DL (ref 11.7–15.4)
IMM GRANULOCYTES # BLD AUTO: 0 K/UL (ref 0–0.5)
IMM GRANULOCYTES NFR BLD AUTO: 1 % (ref 0–5)
LYMPHOCYTES # BLD: 0.8 K/UL (ref 0.5–4.6)
LYMPHOCYTES NFR BLD: 22 % (ref 13–44)
MAGNESIUM SERPL-MCNC: 2.2 MG/DL (ref 1.8–2.4)
MCH RBC QN AUTO: 27.8 PG (ref 26.1–32.9)
MCHC RBC AUTO-ENTMCNC: 31.5 G/DL (ref 31.4–35)
MCV RBC AUTO: 88.2 FL (ref 82–102)
MONOCYTES # BLD: 0.1 K/UL (ref 0.1–1.3)
MONOCYTES NFR BLD: 4 % (ref 4–12)
NEUTS SEG # BLD: 2.6 K/UL (ref 1.7–8.2)
NEUTS SEG NFR BLD: 72 % (ref 43–78)
NRBC # BLD: 0.02 K/UL (ref 0–0.2)
PHOSPHATE SERPL-MCNC: 3 MG/DL (ref 2.5–4.5)
PLATELET # BLD AUTO: 191 K/UL (ref 150–450)
PMV BLD AUTO: 10.5 FL (ref 9.4–12.3)
POTASSIUM SERPL-SCNC: 4.1 MMOL/L (ref 3.5–5.1)
PROT SERPL-MCNC: 5.4 G/DL (ref 6.3–8.2)
RBC # BLD AUTO: 3.38 M/UL (ref 4.05–5.2)
SODIUM SERPL-SCNC: 138 MMOL/L (ref 133–143)
SPECIMEN EXP DATE BLD: NORMAL
TRIGL SERPL-MCNC: 288 MG/DL (ref 35–150)
UNIT DIVISION: 0
WBC # BLD AUTO: 3.6 K/UL (ref 4.3–11.1)

## 2022-10-23 PROCEDURE — 6370000000 HC RX 637 (ALT 250 FOR IP): Performed by: NURSE PRACTITIONER

## 2022-10-23 PROCEDURE — A4216 STERILE WATER/SALINE, 10 ML: HCPCS | Performed by: NURSE PRACTITIONER

## 2022-10-23 PROCEDURE — 6360000002 HC RX W HCPCS: Performed by: NURSE PRACTITIONER

## 2022-10-23 PROCEDURE — 80053 COMPREHEN METABOLIC PANEL: CPT

## 2022-10-23 PROCEDURE — 2580000003 HC RX 258: Performed by: NURSE PRACTITIONER

## 2022-10-23 PROCEDURE — 2500000003 HC RX 250 WO HCPCS: Performed by: INTERNAL MEDICINE

## 2022-10-23 PROCEDURE — 83735 ASSAY OF MAGNESIUM: CPT

## 2022-10-23 PROCEDURE — 2580000003 HC RX 258: Performed by: FAMILY MEDICINE

## 2022-10-23 PROCEDURE — 85025 COMPLETE CBC W/AUTO DIFF WBC: CPT

## 2022-10-23 PROCEDURE — C9113 INJ PANTOPRAZOLE SODIUM, VIA: HCPCS | Performed by: NURSE PRACTITIONER

## 2022-10-23 PROCEDURE — 6360000002 HC RX W HCPCS: Performed by: FAMILY MEDICINE

## 2022-10-23 PROCEDURE — APPSS30 APP SPLIT SHARED TIME 16-30 MINUTES: Performed by: NURSE PRACTITIONER

## 2022-10-23 PROCEDURE — 84478 ASSAY OF TRIGLYCERIDES: CPT

## 2022-10-23 PROCEDURE — 1100000000 HC RM PRIVATE

## 2022-10-23 PROCEDURE — 99232 SBSQ HOSP IP/OBS MODERATE 35: CPT | Performed by: INTERNAL MEDICINE

## 2022-10-23 PROCEDURE — 1100000003 HC PRIVATE W/ TELEMETRY

## 2022-10-23 PROCEDURE — 84100 ASSAY OF PHOSPHORUS: CPT

## 2022-10-23 RX ORDER — FLUCONAZOLE 100 MG/1
200 TABLET ORAL DAILY
Status: DISCONTINUED | OUTPATIENT
Start: 2022-10-23 | End: 2022-10-25

## 2022-10-23 RX ADMIN — HYDROMORPHONE HYDROCHLORIDE 0.5 MG: 1 INJECTION, SOLUTION INTRAMUSCULAR; INTRAVENOUS; SUBCUTANEOUS at 17:09

## 2022-10-23 RX ADMIN — ONDANSETRON 4 MG: 2 INJECTION INTRAMUSCULAR; INTRAVENOUS at 09:37

## 2022-10-23 RX ADMIN — HYDROMORPHONE HYDROCHLORIDE 0.5 MG: 1 INJECTION, SOLUTION INTRAMUSCULAR; INTRAVENOUS; SUBCUTANEOUS at 05:22

## 2022-10-23 RX ADMIN — METOCLOPRAMIDE HYDROCHLORIDE 5 MG: 5 INJECTION INTRAMUSCULAR; INTRAVENOUS at 12:56

## 2022-10-23 RX ADMIN — SODIUM CHLORIDE, PRESERVATIVE FREE 10 ML: 5 INJECTION INTRAVENOUS at 07:59

## 2022-10-23 RX ADMIN — ONDANSETRON 4 MG: 2 INJECTION INTRAMUSCULAR; INTRAVENOUS at 17:09

## 2022-10-23 RX ADMIN — METOCLOPRAMIDE HYDROCHLORIDE 5 MG: 5 INJECTION INTRAMUSCULAR; INTRAVENOUS at 20:56

## 2022-10-23 RX ADMIN — PANTOPRAZOLE SODIUM 40 MG: 40 INJECTION, POWDER, LYOPHILIZED, FOR SOLUTION INTRAVENOUS at 20:56

## 2022-10-23 RX ADMIN — SODIUM CHLORIDE, PRESERVATIVE FREE 10 ML: 5 INJECTION INTRAVENOUS at 21:09

## 2022-10-23 RX ADMIN — ONDANSETRON 4 MG: 2 INJECTION INTRAMUSCULAR; INTRAVENOUS at 01:13

## 2022-10-23 RX ADMIN — POTASSIUM CHLORIDE: 2 INJECTION, SOLUTION, CONCENTRATE INTRAVENOUS at 18:12

## 2022-10-23 RX ADMIN — METOCLOPRAMIDE HYDROCHLORIDE 5 MG: 5 INJECTION INTRAMUSCULAR; INTRAVENOUS at 04:25

## 2022-10-23 RX ADMIN — CEFTRIAXONE 1000 MG: 1 INJECTION, POWDER, FOR SOLUTION INTRAMUSCULAR; INTRAVENOUS at 10:52

## 2022-10-23 RX ADMIN — SOYBEAN OIL 250 ML: 20 INJECTION, SOLUTION INTRAVENOUS at 18:12

## 2022-10-23 RX ADMIN — PANTOPRAZOLE SODIUM 40 MG: 40 INJECTION, POWDER, LYOPHILIZED, FOR SOLUTION INTRAVENOUS at 07:59

## 2022-10-23 RX ADMIN — HYDROMORPHONE HYDROCHLORIDE 0.5 MG: 1 INJECTION, SOLUTION INTRAMUSCULAR; INTRAVENOUS; SUBCUTANEOUS at 13:40

## 2022-10-23 RX ADMIN — FLUCONAZOLE 200 MG: 100 TABLET ORAL at 10:52

## 2022-10-23 RX ADMIN — HYDROMORPHONE HYDROCHLORIDE 0.5 MG: 1 INJECTION, SOLUTION INTRAMUSCULAR; INTRAVENOUS; SUBCUTANEOUS at 01:12

## 2022-10-23 RX ADMIN — HYDROMORPHONE HYDROCHLORIDE 0.5 MG: 1 INJECTION, SOLUTION INTRAMUSCULAR; INTRAVENOUS; SUBCUTANEOUS at 21:10

## 2022-10-23 RX ADMIN — HYDROMORPHONE HYDROCHLORIDE 0.5 MG: 1 INJECTION, SOLUTION INTRAMUSCULAR; INTRAVENOUS; SUBCUTANEOUS at 09:37

## 2022-10-23 ASSESSMENT — PAIN SCALES - GENERAL
PAINLEVEL_OUTOF10: 8
PAINLEVEL_OUTOF10: 5
PAINLEVEL_OUTOF10: 7
PAINLEVEL_OUTOF10: 7
PAINLEVEL_OUTOF10: 3
PAINLEVEL_OUTOF10: 7

## 2022-10-23 ASSESSMENT — PAIN DESCRIPTION - DESCRIPTORS
DESCRIPTORS: ACHING;BURNING
DESCRIPTORS: ACHING;DISCOMFORT
DESCRIPTORS: GNAWING
DESCRIPTORS: ACHING;DISCOMFORT

## 2022-10-23 ASSESSMENT — PAIN DESCRIPTION - LOCATION
LOCATION: ABDOMEN

## 2022-10-23 ASSESSMENT — PAIN DESCRIPTION - PAIN TYPE
TYPE: ACUTE PAIN

## 2022-10-23 ASSESSMENT — PAIN DESCRIPTION - FREQUENCY: FREQUENCY: CONTINUOUS

## 2022-10-23 ASSESSMENT — PAIN - FUNCTIONAL ASSESSMENT
PAIN_FUNCTIONAL_ASSESSMENT: ACTIVITIES ARE NOT PREVENTED
PAIN_FUNCTIONAL_ASSESSMENT: ACTIVITIES ARE NOT PREVENTED
PAIN_FUNCTIONAL_ASSESSMENT: PREVENTS OR INTERFERES SOME ACTIVE ACTIVITIES AND ADLS

## 2022-10-23 ASSESSMENT — PAIN DESCRIPTION - ORIENTATION
ORIENTATION: MID

## 2022-10-23 ASSESSMENT — PAIN DESCRIPTION - ONSET: ONSET: ON-GOING

## 2022-10-23 NOTE — CONSULTS
Comprehensive Nutrition Assessment    Type and Reason for Visit: Reassess  TPN Management (Oncology) and Poor Intake/Appetite 5 or More Days (Hospitalists)    Nutrition Recommendations/Plan:   Meals and Snacks:  Diet: Liberalize Regular, GI soft  Nutrition Supplement Therapy:  Medical food supplement therapy:  Continue Ensure Clear three times per day (this provides 240 kcal and 8 grams protein per bottle)  Discontinue Ensure High Protein  Parenteral Nutrition:  Central parenteral nutrition  begins at 1800 today  Advance: Dex 10% , 4.25% AA 2 L (85ml/hr)   Continue 250 ml 20% lipids daily  To provide: 1520 kcal/d (100% of needs), 85 grams of protein/d (100% of needs), 200 grams of CHO/d and ~2250 ml of total volume/d. Lytes/L:   Sodium ( 85 meq NaCl and 65 meq NaAcetate), Potassium ( 15 meq KCl) Phosphorus ( 12.5 mmol KPO4), Calcium (4.5 meq), Magnesium 8 meq   Solution formulated to be 1:1 Cl: Acetate ratio. K additives adjusted to maintain same daily meq with increase in PN volume  Other additives:   MVI Monday Wednesday Friday due to [employer], MTE  Nutrition Related Medication Management:   Thiamine  mg x 7 days (day 2 of 7 days)  Labs:   CMP daily per MD  Mg daily per MD  Phos daily x 3 days then MWF    Triglyceride tomorrow  POC Glucoses/SSI Not indicated     Malnutrition Assessment:  Malnutrition Status: Severe malnutrition  Context: Chronic Illness  Findings of clinical characteristics of malnutrition:   Energy Intake:  Mild decrease in energy intake (Comment) (unable to tolerate even water for unknown time frame)  Weight Loss:  Greater than 20% over 1 year (42# (30.9%))     Body Fat Loss:  Severe body fat loss Triceps, Fat Overlying Ribs   Muscle Mass Loss:  Severe muscle mass loss Temples (temporalis), Clavicles (pectoralis & deltoids), Scapula (trapezius)  Fluid Accumulation:  No significant fluid accumulation     Strength:  Not Performed     Nutrition Assessment:  Nutrition History: Patient known to RD from recent admission. She was eating normally prior to recent admission. Usual intake was 3 meals per day. She has historically not tolerated Ensure/Boost products. PO intake last admission was 1-25% of meals but did drink Ensure Clear well. Today she states that she has been having difficulty swallowing and everything she tried to eat or drink has been coming back up. She states that is progressively got worse until she was unable to tolerate even water. Do You Have Any Cultural, Restorationism, or Ethnic Food Preferences?: Yes (Comment)   Nutrition Background:       Patient with PMH significant for gastric sleeve, hydronephrosis with stents and recent right stent exchange (left unable to be exchanged due to tumor), metastatic adeno with suspected GI origin with peritoneal carcinomatosis on diagnosis, debulking unsuccessful, recent GIB, DVT, Pleurx placement 9/26/22. She presented with abdominal pain, difficulty swallowing \"stuck in throat and vomits. \" She was supposed to have outpt EGD but presented to ER with intractable nausea and vomiting. She is now s/p EGD 10/21 with findings of LA grade D esophagitis, proximal esophageal stricture, Sckatzki's ring. Nutrition Interval:  Right single lumen port in place. Dilute TPN initiated 10/222 with lipids. Per GI op note from EGD 10/22 \"Full liquids today; advance tomorrow as tolerated. \"    Patient seen sitting up in bed eating a popsicle. She had many questions regarding TPN which were answered. She states that she feels \"different\" since TPN initiated. When asked what she means, she states she has more energy. She asks if she can still eat on TPN. Explained that TPN will meet all her needs but she can still eat ad vicki. She was very pleased today. Discussed with Fernando Branch RN and Dr. Kayce Weinberg. Will advance diet to easy to chew with GI soft restrictions.     Abdominal Status (last documented):   Last BM (including prior to admit): 10/20/22, GI Symptoms: Constipation, Nausea, Loss of appetite   Pertinent Medications: Rocephin, Reglan, Zofran PRN (utilizing daily), Protonix  Electrolyte replacements: 2 gm Mag 10/22  Pertinent Labs:   Lab Results   Component Value Date/Time     10/23/2022 03:52 AM    K 4.1 10/23/2022 03:52 AM     10/23/2022 03:52 AM    CO2 23 10/23/2022 03:52 AM    BUN 7 10/23/2022 03:52 AM    CREATININE 0.40 10/23/2022 03:52 AM    GLUCOSE 115 10/23/2022 03:52 AM    CALCIUM 7.7 10/23/2022 03:52 AM    PHOS 3.0 10/23/2022 03:52 AM    MG 2.2 10/23/2022 03:52 AM     Lab Results   Component Value Date/Time    TRIG 288 10/23/2022 03:52 AM       Remarkable for: elevated triglyceride - ? Related to lab draw time - repeat TG tomorrow am, CO2 trending down    Current Nutrition Therapies:  ADULT DIET; Full Liquid  ADULT ORAL NUTRITION SUPPLEMENT; Breakfast, Lunch, Dinner; Clear Liquid Oral Supplement  ADULT ORAL NUTRITION SUPPLEMENT; Lunch, Dinner; Low Calorie/High Protein Oral Supplement  PN-Adult Premix 4.25/5 - Peripheral Line  Current Parenteral Nutrition Orders:  Type and Formula: Premix Central (Dex 5%, 4.25%)   Lipids: 250ml, Daily  Duration: Continuous  Rate/Volume: 1.8 L (75ml/hr)  Current PN Order Provides: 1112 kcal/d (75% of needs), 77 grams of protein/d (100% of needs), 90 grams of CHO/d and 1800 ml of total volume/d.   Goal PN Orders Provides:        Current Intake:   Average Meal Intake:  (bites and sips) Average Supplements Intake: 26-50%      Anthropometric Measures:  Height: 5' 4\" (162.6 cm)  Current Body Wt: 113 lb 3.2 oz (51.3 kg) (10/23), Weight source: Bed Scale  BMI: 19.4, Underweight (BMI less than 18.5)  Admission Body Weight: 92 lb (41.7 kg) (10/20 office wt)  Ideal Body Weight (Kg) (Calculated): 55 kg (120 lbs), 78.3 %  Usual Body Wt: 136 lb (61.7 kg) (10/19/21 office visit), Percent weight change: -30.9       Edema:    BMI Category Underweight (BMI less than 18.5)  Estimated Daily Nutrient Needs:  Energy (kcal/day): 8048-3833 (Kcal/kg (35-40) Weight used: 42.6 kg Current  Protein (g/day): 64-85 (1.2-5 g/kg) Weight Used: (Current) 42.6 kg  Fluid (ml/day):   (1 ml/kcal)    Nutrition Diagnosis:   Inadequate oral intake related to swallowing difficulty as evidenced by  (reported barrier to PO, intake as above, erosive esophagitis)  Severe malnutrition related to inadequate protein-energy intake as evidenced by Criteria as identified in malnutrition assessment    Nutrition Interventions:   Food and/or Nutrient Delivery: Modify Current Diet, Continue Oral Nutrition Supplement, Modify Parenteral Nutrition     Coordination of Nutrition Care: Continue to monitor while inpatient       Goals:   Previous Goal Met: Progressing toward Goal(s)  Active Goal: Tolerate nutrition support at goal rate, within 7 days       Nutrition Monitoring and Evaluation:      Food/Nutrient Intake Outcomes: Food and Nutrient Intake, Supplement Intake, Parenteral Nutrition Intake/Tolerance  Physical Signs/Symptoms Outcomes: Biochemical Data, GI Status, Meal Time Behavior, Weight    Discharge Planning:    Parenteral Nutrition    Mariano Rizvi

## 2022-10-23 NOTE — PROGRESS NOTES
[facility] Hematology & Oncology        Inpatient Hematology / Oncology Progress Note    Reason for Admission:  Chronic abdominal pain [R10.9, G89.29]  Metastatic carcinoma (HCC) [C79.9]  Intractable nausea and vomiting [R11.2]  Dysphagia, unspecified type [R13.10]    24 Hour Events:  Afebrile, VSS  On TPN  Gross hematuria noted, Eliquis held  Completes CTX today for UTI  Ucx +yeast, I/S pending    Transfusions: None  Replacements: None      ROS:  Constitutional: Negative for fever, chills. CV: Negative for chest pain, palpitations, edema. Respiratory: Negative for dyspnea, cough, wheezing. GI: Negative for abdominal pain, diarrhea. : +hematuria    10 point review of systems is otherwise negative with the exception of the elements mentioned above in the HPI.        No Known Allergies  Past Medical History:   Diagnosis Date    Anemia     -- not a problem since hyst    Colon cancer ([address] dx 2022     plans for chemo--- followed by dr Dennie Kelch COVID-19 2020    no hospitalization    GERD (gastroesophageal reflux disease)     managed with med    History of colonoscopy 2018    Dr. Dang Khan, nl (see media note), R     Hx of blood clots 2022    per pt \"small clot on lung identified by CT scan\"  CT Scan impression:- Nonobstructive pulmonary filling defect involving Left Lower Lobe     Hypotension     asymptomatic    Obstruction of fallopian tube     per pt has \"1 good tube\"    Peritoneal carcinomatosis ([address]     Weight loss     80lbs weight loss after gastric sleeve     Past Surgical History:   Procedure Laterality Date     SECTION      CYSTOSCOPY Bilateral 2022    CYSTOSCOPY BILATERAL RETROGRADE PYELOGRAM performed by Bryon Burkitt, MD at [address] Bilateral 2022    BILATERAL CYSTOSCOPY URETERAL STENT EXCHANGE performed by Bryon Burkitt, MD at [address] Bilateral 10/6/2022    CYSTOSCOPY BILATERAL URETERAL STENT REMOVAL, RIGHT URETERAL [address] PLACEMENT performed by Masha Chapman MD at [facility] MAIN OR    GASTRIC BYPASS SURGERY  07/23/2014    gastric sleeve- [place]    HYSTERECTOMY (CERVIX STATUS UNKNOWN)      2018    HYSTERECTOMY (CERVIX STATUS UNKNOWN)  07/09/2020    [facility] w/ Bilateral salpingectomy and left oophorectomy    IR PERC CATH PLEURAL DRAIN W/IMAG  9/26/2022    IR PERC CATH PLEURAL DRAIN W/IMAG 9/26/2022 [facility]    IR PORT PLACEMENT EQUAL OR GREATER THAN 5 YEARS  2/28/2022    IR PORT PLACEMENT EQUAL OR GREATER THAN 5 YEARS  2/28/2022    IR PORT PLACEMENT EQUAL OR GREATER THAN 5 YEARS 2/28/2022 [facility]    LAPAROTOMY N/A 8/17/2022    EXPLORATORY LAPAROTOMY/ ENTEROENTEROSTOMY performed by Herberth Interiano MD at [facility] [address]  age Curlene Reach 21s\"    also \"unblocked her FT\"    TONSILLECTOMY      UPPER GASTROINTESTINAL ENDOSCOPY      with dilation    UPPER GASTROINTESTINAL ENDOSCOPY N/A 9/13/2022    EGD ESOPHAGOGASTRODUODENOSCOPY OFL 17 To IR after recovery for Para performed by Elly Noonan MD at [facility] ENDOSCOPY    UROLOGICAL SURGERY Bilateral 03/15/2022    cysto     Family History   Problem Relation Age of Onset    Heart Disease Maternal Grandmother     Hypertension Maternal Grandmother     Heart Disease Maternal Grandfather     Hypertension Maternal Grandfather     Pulmonary Embolism Neg Hx     Colon Cancer Neg Hx     Deep Vein Thrombosis Neg Hx     Ovarian Cancer Neg Hx     Prostate Cancer Neg Hx     Breast Cancer Neg Hx      Social History     Socioeconomic History    Marital status: Single     Spouse name: Not on file    Number of children: Not on file    Years of education: Not on file    Highest education level: Not on file   Occupational History    Not on file   Tobacco Use    Smoking status: Never    Smokeless tobacco: Never   Vaping Use    Vaping Use: Never used   Substance and Sexual Activity    Alcohol use: Not Currently    Drug use: No    Sexual activity: Not on file   Other Topics Concern    Not on file   Social History Narrative    1. Use large speculum. 2.  sister, Bernardino Cheatham #[id number] and mom is Enrrique Alcazar #[id number] (both Kofoed's pts)  3.   PCP  Dr. Cristela Peguero ([facility]), GI Dr. Serjio Bello-2018 normal EGD     Social Determinants of Health     Financial Resource Strain: Not on file   Food Insecurity: Not on file   Transportation Needs: Not on file   Physical Activity: Not on file   Stress: Not on file   Social Connections: Not on file   Intimate Partner Violence: Not on file   Housing Stability: Not on file     Current Facility-Administered Medications   Medication Dose Route Frequency Provider Last Rate Last Admin    0.9 % sodium chloride infusion   IntraVENous PRN Lakia Saucedo MD        PN-Adult Premix 4.25/5 - Peripheral Line   IntraVENous Continuous TPN Lakia Saucedo MD 75 mL/hr at 10/22/22 5502 [facility] at 10/22/22 1757    fat emulsion 20 % infusion 250 mL  250 mL IntraVENous Daily Lakia Saucedo MD   Stopped at 10/23/22 0557    cefTRIAXone (ROCEPHIN) 1,000 mg in sodium chloride 0.9 % 50 mL IVPB mini-bag  1,000 mg IntraVENous Q24H Isaura Knife, APRN - CNP   Stopped at 10/22/22 1353    HYDROcodone-acetaminophen (NORCO) 5-325 MG per tablet 1 tablet  1 tablet Oral Q6H PRN Isaura Knife, APRN - CNP        pantoprazole (PROTONIX) 40 mg in sodium chloride (PF) 10 mL injection  40 mg IntraVENous BID Pipo Coleman, APRN - NP   40 mg at 10/23/22 0759    sodium chloride flush 0.9 % injection 5-40 mL  5-40 mL IntraVENous 2 times per day Josepha [place], DO   10 mL at 10/23/22 0759    sodium chloride flush 0.9 % injection 5-40 mL  5-40 mL IntraVENous PRN Josepha [place], DO        0.9 % sodium chloride infusion   IntraVENous PRN Josepha [place], DO        ondansetron (ZOFRAN-ODT) disintegrating tablet 4 mg  4 mg Oral Q8H PRN Josepha [place], DO        Or    ondansetron (ZOFRAN) injection 4 mg  4 mg IntraVENous Q6H PRN Josepha [place], DO   4 mg at 10/23/22 0113    acetaminophen (TYLENOL) tablet 650 mg  650 mg Oral Q6H PRN Marissa [place], DO        Or    acetaminophen (TYLENOL) suppository 650 mg  650 mg Rectal Q6H PRN Marissa [place], DO        polyethylene glycol (GLYCOLAX) packet 17 g  17 g Oral Daily PRN Marissa [place], DO        hyoscyamine (LEVSIN/SL) sublingual tablet 125 mcg  125 mcg SubLINGual Q4H PRN Marissa [place], DO        glucose chewable tablet 16 g  4 tablet Oral PRN Marissa [place], DO        dextrose bolus 10% 125 mL  125 mL IntraVENous PRN Marissa [place], DO        Or    dextrose bolus 10% 250 mL  250 mL IntraVENous PRN Marissa [place], DO        glucagon (rDNA) injection 1 mg  1 mg SubCUTAneous PRN Marissa [place], DO        dextrose 10 % infusion   IntraVENous Continuous PRN Marissa [place], DO        HYDROmorphone HCl PF (DILAUDID) injection 0.5 mg  0.5 mg IntraVENous Q4H PRN Marissa [place], DO   0.5 mg at 10/23/22 0522    metoclopramide (REGLAN) injection 5 mg  5 mg IntraVENous q8h Marissa [place], DO   5 mg at 10/23/22 0425    apixaban (ELIQUIS) tablet 5 mg  5 mg Oral BID Marissa [place], DO   5 mg at 10/22/22 2036       OBJECTIVE:  Patient Vitals for the past 8 hrs:   BP Temp Temp src Pulse Resp SpO2 Weight   10/23/22 0930 -- -- -- -- -- -- 113 lb 3.2 oz (51.3 kg)   10/23/22 0743 117/87 98.6 °F (37 °C) Oral 85 18 96 % --   10/23/22 0552 -- -- -- -- 18 -- --   10/23/22 0522 -- -- -- -- 19 -- --   10/23/22 0214 126/87 98.2 °F (36.8 °C) Oral 88 17 97 % --     Temp (24hrs), Av °F (36.7 °C), Min:97.5 °F (36.4 °C), Max:98.6 °F (37 °C)    10/23 07 - 10/23 1900  In: -   Out: 300 [Urine:300]    Physical Exam:  Constitutional: Chronically ill-appearing female in no acute distress, sitting comfortably in the hospital bed. HEENT: Normocephalic and atraumatic. Oropharynx is clear, mucous membranes are moist.  Extraocular muscles are intact. Sclerae anicteric. Neck supple without JVD. No thyromegaly present. Skin Warm and dry. No bruising and no rash noted. No erythema. No pallor.     Respiratory Lungs are clear to auscultation bilaterally without wheezes, rales or rhonchi, normal air exchange without accessory muscle use. CVS Normal rate, regular rhythm and normal S1 and S2. No murmurs, gallops, or rubs. Abdomen Soft, nontender and nondistended, normoactive bowel sounds. No palpable mass. No hepatosplenomegaly. Neuro Grossly nonfocal with no obvious sensory or motor deficits. MSK Normal range of motion in general.  No edema and no tenderness. Psych Appropriate mood and affect.         Labs:    Recent Results (from the past 24 hour(s))   CBC with Auto Differential    Collection Time: 10/23/22  3:52 AM   Result Value Ref Range    WBC 3.6 (L) 4.3 - 11.1 K/uL    RBC 3.38 (L) 4.05 - 5.2 M/uL    Hemoglobin 9.4 (L) 11.7 - 15.4 g/dL    Hematocrit 29.8 (L) 35.8 - 46.3 %    MCV 88.2 82 - 102 FL    MCH 27.8 26.1 - 32.9 PG    MCHC 31.5 31.4 - 35.0 g/dL    RDW 19.5 (H) 11.9 - 14.6 %    Platelets 273 163 - 997 K/uL    MPV 10.5 9.4 - 12.3 FL    nRBC 0.02 0.0 - 0.2 K/uL    Differential Type AUTOMATED      Seg Neutrophils 72 43 - 78 %    Lymphocytes 22 13 - 44 %    Monocytes 4 4.0 - 12.0 %    Eosinophils % 1 0.5 - 7.8 %    Basophils 0 0.0 - 2.0 %    Immature Granulocytes 1 0.0 - 5.0 %    Segs Absolute 2.6 1.7 - 8.2 K/UL    Absolute Lymph # 0.8 0.5 - 4.6 K/UL    Absolute Mono # 0.1 0.1 - 1.3 K/UL    Absolute Eos # 0.0 0.0 - 0.8 K/UL    Basophils Absolute 0.0 0.0 - 0.2 K/UL    Absolute Immature Granulocyte 0.0 0.0 - 0.5 K/UL   Magnesium    Collection Time: 10/23/22  3:52 AM   Result Value Ref Range    Magnesium 2.2 1.8 - 2.4 mg/dL   Comprehensive Metabolic Panel    Collection Time: 10/23/22  3:52 AM   Result Value Ref Range    Sodium 138 133 - 143 mmol/L    Potassium 4.1 3.5 - 5.1 mmol/L    Chloride 108 101 - 110 mmol/L    CO2 23 21 - 32 mmol/L    Anion Gap 7 2 - 11 mmol/L    Glucose 115 (H) 65 - 100 mg/dL    BUN 7 6 - 23 MG/DL    Creatinine 0.40 (L) 0.6 - 1.0 MG/DL    Est, Glom Filt Rate >60 >60 ml/min/1.73m2    Calcium 7.7 (L) 8.3 - 10.4 MG/DL    Total Bilirubin 0.2 0.2 - 1.1 MG/DL    ALT 9 (L) 12 - 65 U/L    AST 14 (L) 15 - 37 U/L    Alk Phosphatase 70 50 - 136 U/L    Total Protein 5.4 (L) 6.3 - 8.2 g/dL    Albumin 2.4 (L) 3.5 - 5.0 g/dL    Globulin 3.0 2.8 - 4.5 g/dL    Albumin/Globulin Ratio 0.8 0.4 - 1.6     Phosphorus    Collection Time: 10/23/22  3:52 AM   Result Value Ref Range    Phosphorus 3.0 2.5 - 4.5 MG/DL   Triglyceride    Collection Time: 10/23/22  3:52 AM   Result Value Ref Range    Triglycerides 288 (H) 35 - 150 MG/DL       Imaging:  Xray Result (most recent):  XR CHEST LIMITED ONE VIEW 10/20/2022    Narrative  Chest X-ray    INDICATION: Chest pain    AP/PA view of the chest was obtained. FINDINGS: The lungs are clear. There are no infiltrates or effusions. The heart  size is normal.  Vascular port is present. Impression  No acute findings in the chest    CT Result (most recent):  CT ABDOMEN PELVIS W IV CONTRAST 10/20/2022    Narrative  CT OF THE ABDOMEN AND PELVIS    INDICATION: Abdominal pain and nausea, history of peritoneal carcinomatosis. Multiple axial images were obtained through the abdomen and pelvis. Oral  contrast was used for bowel opacification. 100mL of Isovue 370 intravenous  contrast was used for better evaluation of solid organs and vascular structures. Radiation dose reduction techniques were used for this study. All CT scans  performed at this facility use one or all of the following: Automated exposure  control, adjustment of the mA and/or kVp according to patient's size, iterative  reconstruction. COMPARISON: 10/03/2022    FINDINGS:  - LUNG BASES: Small left pleural effusion. Right lung base is clear.    - LIVER: Normal in size and appearance. - GALLBLADDER/BILE DUCTS: Stable mild bile duct dilatation.  - PANCREAS: Normal.  - SPLEEN: Small, mottled enhancement    - ADRENALS: Normal.  - KIDNEYS/URETERS: Right ureteral stent is in place. Decreased right  hydronephrosis.   Left ureteral stent has been removed. Only mild residual  prominence of the left collecting system. - BLADDER: Distal end of the right ureteral stent is in the bladder. While  diffuse bladder wall thickening.  - REPRODUCTIVE ORGANS: Post hysterectomy.    - BOWEL: Mild diffuse bowel wall thickening, both small bowel and colon. Esophagus is distended. No bowel distention.  - LYMPH NODES: No significant retroperitoneal, mesenteric, or pelvic adenopathy.  - BONES: No fracture or significant bone lesion.  - VASCULATURE: Normal  - OTHER: Peritoneal nodularity is again noted. There is increased ascites. Percutaneous strain is present in the right abdomen. Impression  1. Peritoneal carcinomatosis and increased ascites. 2.  Persistent diffuse bowel wall thickening. 3.  Persistent but improved bilateral hydronephrosis. 4.  Diffuse bladder wall thickening.       If there are any questions about this report, I can be reached on [place]  or at [phone]      ASSESSMENT:  Patient Active Problem List   Diagnosis    Fibroids    Paraesophageal hernia    Menorrhagia with regular cycle    Papanicolaou smear of cervix with low grade squamous intraepithelial lesion (LGSIL)    Iron deficiency anemia due to chronic blood loss    Anemia    History of weight loss surgery    Carcinoma of unknown primary ([address]    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 ([address]    Bilateral hydronephrosis    Peritoneal carcinoma ([address]    Peritoneal carcinomatosis ([address]    Hematemesis    Current use of long term anticoagulation    Intractable vomiting    Generalized abdominal pain    LFT elevation    Obstruction of both ureters    Abdominal pain    Hypokalemia    Hypomagnesemia    Hypoalbuminemia    GERD (gastroesophageal reflux disease)    Colitis    Intractable vomiting with nausea    Fever    CINV (chemotherapy-induced nausea and vomiting)    Intractable nausea and vomiting    Moderate protein-calorie malnutrition (HCC)    Dysphagia    Metastatic carcinoma [facility])     Ms. Mariano Dudley is a 52 y.o. female admitted on 10/20/2022. The primary encounter diagnosis was Dysphagia, unspecified type. Diagnoses of Chronic abdominal pain and Metastatic carcinoma ([address] were also pertinent to this visit. Rosario Chavira Her PMH includes sleeve gastrectomy, hydronephrosis s/p ureteral stents with recent R stent exchange with unsuccessful L exchange d/t tumor, recent GIB with neg EGD, recurrent ascites s/p pleurx drain 9/26, DVT on Eliquis, and hx esophageal strictures. She is a patient of Dr Fatmata Forrest with metastatic carcinoma of unknown pimary with known peritoneal carcinomatosis on diagnosis. She completed C9 FOLFOX/Avastin on 9/8/22. Treatment recently changed d/t POD. She is s/p C1D8 paclitaxel/Cyramza on 10/20. She has had multiple recent admissions for intractable nausea and vomiting, most recently from 10/3 - 10/7/22 for colitis vs serosal implants of tumor. She was given CTX/Flagyl and then changed to LVQ/Flagyl. She was discharged home on Flagyl/Cipro. She returned to ED on 10/20 with c/o nausea, vomiting, and abd pain. She reports difficulty swallowing foods and liquids. Feels like food gets stuck for the past 3 days, causing her to vomit any time she eats. CT AP with peritoneal carcinomatosis and increased ascites; persistent diffuse bowel wall thickening; persistent but improved b/l hydronephrosis; and diffuse bladder wall thickening. CXR neg. GI following, plans for EGD with possible dilation today. We were asked for recommendations for our patient.       PLAN:  Metastatic carcinoma of unknown primary / peritoneal carcinomatosis  - s/p FOLFOX/Avastin with POD  - s/p C1D8 paclitaxel/Cyramza on 10/20    Nausea / Vomiting / Dysphagia  - CT AP with peritoneal carcinomatosis and increased ascites; persistent diffuse bowel wall thickening; persistent but improved b/l hydronephrosis; and diffuse bladder wall thickening  - GI following, EGD with possible dilation today  - If no improvement after EGD, will consider TPN  - RD consulted  - Check UA   10/22 s/p EGD yesterday with erosive esophagitis, proximal esophageal stricture, and Schatzki ring. On PPI BID. Start TPN. 10/23 Continues on TPN. UTI / Hematuria  - UA c/w UTI  - Ucx ordered  - Start CTX  10/22 Ucx pending. Con't CTX (D2).  10/23 Ucx +yeast, I/S pending. Start Diflucan. Completes CTX today (D3). Gross hematuria noted. Hold Eliquis    Hx DVT  - on Eliquis  10/23 Hold Eliquis d/t hematuria    Continue home meds  Andres SOPs  AC contraindicated d/t hematuria    Goals and plan of care reviewed with the patient. All questions answered to the best of our ability. Disposition:  TBD pending clinical course. Pt hopeful for discharge before Wed as she wants to get her scheduled tx on Thurs. PT/OT recommend HH.             SCOTT [facility] Hematology & Oncology  [address] [place] [address]  Office : [phone]  Fax : [phone]

## 2022-10-24 ENCOUNTER — TELEPHONE (OUTPATIENT)
Dept: ONCOLOGY | Age: 47
End: 2022-10-24

## 2022-10-24 PROBLEM — R10.9 CHRONIC ABDOMINAL PAIN: Status: ACTIVE | Noted: 2022-10-24

## 2022-10-24 PROBLEM — G89.29 CHRONIC ABDOMINAL PAIN: Status: ACTIVE | Noted: 2022-10-24

## 2022-10-24 PROBLEM — E43 SEVERE PROTEIN-CALORIE MALNUTRITION (HCC): Chronic | Status: ACTIVE | Noted: 2022-10-20

## 2022-10-24 LAB
ALBUMIN SERPL-MCNC: 2.2 G/DL (ref 3.5–5)
ALBUMIN/GLOB SERPL: 0.7 {RATIO} (ref 0.4–1.6)
ALP SERPL-CCNC: 63 U/L (ref 50–136)
ALT SERPL-CCNC: 11 U/L (ref 12–65)
ANION GAP SERPL CALC-SCNC: 0 MMOL/L (ref 2–11)
ANION GAP SERPL CALC-SCNC: 2 MMOL/L (ref 2–11)
AST SERPL-CCNC: 19 U/L (ref 15–37)
BACTERIA SPEC CULT: ABNORMAL
BACTERIA SPEC CULT: ABNORMAL
BASOPHILS # BLD: 0 K/UL (ref 0–0.2)
BASOPHILS NFR BLD: 0 % (ref 0–2)
BILIRUB SERPL-MCNC: 0.3 MG/DL (ref 0.2–1.1)
BUN SERPL-MCNC: 12 MG/DL (ref 6–23)
BUN SERPL-MCNC: 13 MG/DL (ref 6–23)
CALCIUM SERPL-MCNC: 7.9 MG/DL (ref 8.3–10.4)
CALCIUM SERPL-MCNC: 8.1 MG/DL (ref 8.3–10.4)
CHLORIDE SERPL-SCNC: 107 MMOL/L (ref 101–110)
CHLORIDE SERPL-SCNC: 108 MMOL/L (ref 101–110)
CO2 SERPL-SCNC: 27 MMOL/L (ref 21–32)
CO2 SERPL-SCNC: 27 MMOL/L (ref 21–32)
CREAT SERPL-MCNC: 0.4 MG/DL (ref 0.6–1)
CREAT SERPL-MCNC: 0.4 MG/DL (ref 0.6–1)
DIFFERENTIAL METHOD BLD: ABNORMAL
EOSINOPHIL # BLD: 0.1 K/UL (ref 0–0.8)
EOSINOPHIL NFR BLD: 2 % (ref 0.5–7.8)
ERYTHROCYTE [DISTWIDTH] IN BLOOD BY AUTOMATED COUNT: 19.1 % (ref 11.9–14.6)
GLOBULIN SER CALC-MCNC: 3.1 G/DL (ref 2.8–4.5)
GLUCOSE SERPL-MCNC: 110 MG/DL (ref 65–100)
GLUCOSE SERPL-MCNC: 119 MG/DL (ref 65–100)
HCT VFR BLD AUTO: 28.9 % (ref 35.8–46.3)
HGB BLD-MCNC: 9.2 G/DL (ref 11.7–15.4)
IMM GRANULOCYTES # BLD AUTO: 0 K/UL (ref 0–0.5)
IMM GRANULOCYTES NFR BLD AUTO: 0 % (ref 0–5)
LDLC SERPL DIRECT ASSAY-MCNC: 58 MG/DL
LYMPHOCYTES # BLD: 0.9 K/UL (ref 0.5–4.6)
LYMPHOCYTES NFR BLD: 23 % (ref 13–44)
MAGNESIUM SERPL-MCNC: 1.7 MG/DL (ref 1.8–2.4)
MAGNESIUM SERPL-MCNC: 1.8 MG/DL (ref 1.8–2.4)
MCH RBC QN AUTO: 28.4 PG (ref 26.1–32.9)
MCHC RBC AUTO-ENTMCNC: 31.8 G/DL (ref 31.4–35)
MCV RBC AUTO: 89.2 FL (ref 82–102)
MONOCYTES # BLD: 0.2 K/UL (ref 0.1–1.3)
MONOCYTES NFR BLD: 5 % (ref 4–12)
NEUTS SEG # BLD: 2.8 K/UL (ref 1.7–8.2)
NEUTS SEG NFR BLD: 70 % (ref 43–78)
NRBC # BLD: 0.02 K/UL (ref 0–0.2)
PHOSPHATE SERPL-MCNC: 3.2 MG/DL (ref 2.5–4.5)
PHOSPHATE SERPL-MCNC: 3.4 MG/DL (ref 2.5–4.5)
PLATELET # BLD AUTO: 185 K/UL (ref 150–450)
PMV BLD AUTO: 10.9 FL (ref 9.4–12.3)
POTASSIUM SERPL-SCNC: 4.1 MMOL/L (ref 3.5–5.1)
POTASSIUM SERPL-SCNC: 4.3 MMOL/L (ref 3.5–5.1)
PROT SERPL-MCNC: 5.3 G/DL (ref 6.3–8.2)
RBC # BLD AUTO: 3.24 M/UL (ref 4.05–5.2)
SERVICE CMNT-IMP: ABNORMAL
SODIUM SERPL-SCNC: 135 MMOL/L (ref 133–143)
SODIUM SERPL-SCNC: 136 MMOL/L (ref 133–143)
TRIGL SERPL-MCNC: 442 MG/DL (ref 35–150)
WBC # BLD AUTO: 4 K/UL (ref 4.3–11.1)

## 2022-10-24 PROCEDURE — 6360000002 HC RX W HCPCS: Performed by: NURSE PRACTITIONER

## 2022-10-24 PROCEDURE — 85025 COMPLETE CBC W/AUTO DIFF WBC: CPT

## 2022-10-24 PROCEDURE — 36591 DRAW BLOOD OFF VENOUS DEVICE: CPT

## 2022-10-24 PROCEDURE — 84100 ASSAY OF PHOSPHORUS: CPT

## 2022-10-24 PROCEDURE — 2500000003 HC RX 250 WO HCPCS: Performed by: INTERNAL MEDICINE

## 2022-10-24 PROCEDURE — 2580000003 HC RX 258: Performed by: NURSE PRACTITIONER

## 2022-10-24 PROCEDURE — 6370000000 HC RX 637 (ALT 250 FOR IP): Performed by: NURSE PRACTITIONER

## 2022-10-24 PROCEDURE — 6370000000 HC RX 637 (ALT 250 FOR IP): Performed by: INTERNAL MEDICINE

## 2022-10-24 PROCEDURE — A4216 STERILE WATER/SALINE, 10 ML: HCPCS | Performed by: NURSE PRACTITIONER

## 2022-10-24 PROCEDURE — 1100000003 HC PRIVATE W/ TELEMETRY

## 2022-10-24 PROCEDURE — 83721 ASSAY OF BLOOD LIPOPROTEIN: CPT

## 2022-10-24 PROCEDURE — 80053 COMPREHEN METABOLIC PANEL: CPT

## 2022-10-24 PROCEDURE — 6360000002 HC RX W HCPCS: Performed by: FAMILY MEDICINE

## 2022-10-24 PROCEDURE — 84478 ASSAY OF TRIGLYCERIDES: CPT

## 2022-10-24 PROCEDURE — C9113 INJ PANTOPRAZOLE SODIUM, VIA: HCPCS | Performed by: NURSE PRACTITIONER

## 2022-10-24 PROCEDURE — 2580000003 HC RX 258: Performed by: FAMILY MEDICINE

## 2022-10-24 PROCEDURE — 1100000000 HC RM PRIVATE

## 2022-10-24 PROCEDURE — 83735 ASSAY OF MAGNESIUM: CPT

## 2022-10-24 PROCEDURE — 99233 SBSQ HOSP IP/OBS HIGH 50: CPT | Performed by: INTERNAL MEDICINE

## 2022-10-24 RX ORDER — POLYETHYLENE GLYCOL 3350 17 G/17G
17 POWDER, FOR SOLUTION ORAL DAILY
Status: DISCONTINUED | OUTPATIENT
Start: 2022-10-24 | End: 2022-10-26 | Stop reason: HOSPADM

## 2022-10-24 RX ORDER — SENNA AND DOCUSATE SODIUM 50; 8.6 MG/1; MG/1
2 TABLET, FILM COATED ORAL DAILY
Status: DISCONTINUED | OUTPATIENT
Start: 2022-10-24 | End: 2022-10-26 | Stop reason: HOSPADM

## 2022-10-24 RX ORDER — CALCIUM CARBONATE 200(500)MG
500 TABLET,CHEWABLE ORAL 3 TIMES DAILY PRN
Status: DISCONTINUED | OUTPATIENT
Start: 2022-10-24 | End: 2022-10-26 | Stop reason: HOSPADM

## 2022-10-24 RX ADMIN — ONDANSETRON 4 MG: 2 INJECTION INTRAMUSCULAR; INTRAVENOUS at 10:21

## 2022-10-24 RX ADMIN — CALCIUM CARBONATE (ANTACID) CHEW TAB 500 MG 500 MG: 500 CHEW TAB at 23:10

## 2022-10-24 RX ADMIN — HYDROMORPHONE HYDROCHLORIDE 0.5 MG: 1 INJECTION, SOLUTION INTRAMUSCULAR; INTRAVENOUS; SUBCUTANEOUS at 23:16

## 2022-10-24 RX ADMIN — DOCUSATE SODIUM 50 MG AND SENNOSIDES 8.6 MG 2 TABLET: 8.6; 5 TABLET, FILM COATED ORAL at 11:12

## 2022-10-24 RX ADMIN — ONDANSETRON 4 MG: 2 INJECTION INTRAMUSCULAR; INTRAVENOUS at 16:43

## 2022-10-24 RX ADMIN — FLUCONAZOLE 200 MG: 100 TABLET ORAL at 08:24

## 2022-10-24 RX ADMIN — SODIUM CHLORIDE, PRESERVATIVE FREE 10 ML: 5 INJECTION INTRAVENOUS at 21:06

## 2022-10-24 RX ADMIN — METOCLOPRAMIDE HYDROCHLORIDE 5 MG: 5 INJECTION INTRAMUSCULAR; INTRAVENOUS at 20:50

## 2022-10-24 RX ADMIN — HYDROMORPHONE HYDROCHLORIDE 0.5 MG: 1 INJECTION, SOLUTION INTRAMUSCULAR; INTRAVENOUS; SUBCUTANEOUS at 05:25

## 2022-10-24 RX ADMIN — POLYETHYLENE GLYCOL 3350 17 G: 17 POWDER, FOR SOLUTION ORAL at 11:12

## 2022-10-24 RX ADMIN — HYDROMORPHONE HYDROCHLORIDE 0.5 MG: 1 INJECTION, SOLUTION INTRAMUSCULAR; INTRAVENOUS; SUBCUTANEOUS at 01:40

## 2022-10-24 RX ADMIN — PANTOPRAZOLE SODIUM 40 MG: 40 INJECTION, POWDER, LYOPHILIZED, FOR SOLUTION INTRAVENOUS at 08:24

## 2022-10-24 RX ADMIN — SODIUM CHLORIDE, PRESERVATIVE FREE 10 ML: 5 INJECTION INTRAVENOUS at 09:22

## 2022-10-24 RX ADMIN — METOCLOPRAMIDE HYDROCHLORIDE 5 MG: 5 INJECTION INTRAMUSCULAR; INTRAVENOUS at 14:06

## 2022-10-24 RX ADMIN — ONDANSETRON 4 MG: 2 INJECTION INTRAMUSCULAR; INTRAVENOUS at 23:17

## 2022-10-24 RX ADMIN — HYDROMORPHONE HYDROCHLORIDE 0.5 MG: 1 INJECTION, SOLUTION INTRAMUSCULAR; INTRAVENOUS; SUBCUTANEOUS at 10:21

## 2022-10-24 RX ADMIN — ONDANSETRON 4 MG: 2 INJECTION INTRAMUSCULAR; INTRAVENOUS at 01:45

## 2022-10-24 RX ADMIN — METOCLOPRAMIDE HYDROCHLORIDE 5 MG: 5 INJECTION INTRAMUSCULAR; INTRAVENOUS at 04:24

## 2022-10-24 RX ADMIN — PANTOPRAZOLE SODIUM 40 MG: 40 INJECTION, POWDER, LYOPHILIZED, FOR SOLUTION INTRAVENOUS at 20:50

## 2022-10-24 RX ADMIN — HYDROMORPHONE HYDROCHLORIDE 0.5 MG: 1 INJECTION, SOLUTION INTRAMUSCULAR; INTRAVENOUS; SUBCUTANEOUS at 14:06

## 2022-10-24 RX ADMIN — SODIUM ACETATE: 164 INJECTION, SOLUTION, CONCENTRATE INTRAVENOUS at 18:35

## 2022-10-24 RX ADMIN — HYDROMORPHONE HYDROCHLORIDE 0.5 MG: 1 INJECTION, SOLUTION INTRAMUSCULAR; INTRAVENOUS; SUBCUTANEOUS at 18:36

## 2022-10-24 ASSESSMENT — PAIN SCALES - GENERAL
PAINLEVEL_OUTOF10: 4
PAINLEVEL_OUTOF10: 8
PAINLEVEL_OUTOF10: 8
PAINLEVEL_OUTOF10: 7
PAINLEVEL_OUTOF10: 7
PAINLEVEL_OUTOF10: 4
PAINLEVEL_OUTOF10: 7

## 2022-10-24 ASSESSMENT — PAIN DESCRIPTION - LOCATION
LOCATION: ABDOMEN

## 2022-10-24 ASSESSMENT — PAIN DESCRIPTION - PAIN TYPE
TYPE: ACUTE PAIN
TYPE: ACUTE PAIN

## 2022-10-24 ASSESSMENT — PAIN DESCRIPTION - ONSET
ONSET: ON-GOING
ONSET: ON-GOING

## 2022-10-24 ASSESSMENT — PAIN DESCRIPTION - DESCRIPTORS
DESCRIPTORS: DISCOMFORT
DESCRIPTORS: GNAWING
DESCRIPTORS: GNAWING

## 2022-10-24 ASSESSMENT — PAIN - FUNCTIONAL ASSESSMENT
PAIN_FUNCTIONAL_ASSESSMENT: PREVENTS OR INTERFERES SOME ACTIVE ACTIVITIES AND ADLS
PAIN_FUNCTIONAL_ASSESSMENT: PREVENTS OR INTERFERES SOME ACTIVE ACTIVITIES AND ADLS

## 2022-10-24 ASSESSMENT — PAIN DESCRIPTION - ORIENTATION
ORIENTATION: MID

## 2022-10-24 ASSESSMENT — PAIN DESCRIPTION - FREQUENCY
FREQUENCY: CONTINUOUS
FREQUENCY: CONTINUOUS

## 2022-10-24 NOTE — TELEPHONE ENCOUNTER
Called [facility] member services at [phone], advised I need a fax number to send appeal to, I was told there is no fax number available for appeal and I have advised seriousness of this situation.  Per [facility] can send fax as a one time courtesy to fax # [phone]: APPEALS DEPT   REQUESTER NAME:  REFERENCE # [id number]  Dept code: [id number] [facility]    To follow up call [phone], 30-45 day for appeal to process since they do not  offer expedited appeal.      Rep still pushing we mail appeal to  Mail:  [address] [place]  [facility], [address]      Faxed appeal to [phone] , notified clinical team.

## 2022-10-24 NOTE — PROGRESS NOTES
[facility] Hematology & Oncology        Inpatient Hematology / Oncology Progress Note    Reason for Admission:  Chronic abdominal pain [R10.9, G89.29]  Metastatic carcinoma (HCC) [C79.9]  Intractable nausea and vomiting [R11.2]  Dysphagia, unspecified type [R13.10]    24 Hour Events:  Afebrile, VSS  On TPN  Ucx +yeast, I/S pending  Consult CM for discharge needs on TPN  Recheck iron studies/nutritional labs  Constipation-start scheduled pericolace and miralax      ROS:  Constitutional: Negative for fever, chills. CV: Negative for chest pain, palpitations, edema. Respiratory: Negative for dyspnea, cough, wheezing. GI: Negative for abdominal pain, diarrhea. +constipation  : +hematuria    10 point review of systems is otherwise negative with the exception of the elements mentioned above in the HPI.        No Known Allergies  Past Medical History:   Diagnosis Date    Anemia     -- not a problem since hyst    Colon cancer ([address] dx 2022     plans for chemo--- followed by dr Mireille Mustafa    COVID-19 2020    no hospitalization    GERD (gastroesophageal reflux disease)     managed with med    History of colonoscopy 2018    Dr. Bridgett Ta, nl (see media note), R     Hx of blood clots 2022    per pt \"small clot on lung identified by CT scan\"  CT Scan impression:- Nonobstructive pulmonary filling defect involving Left Lower Lobe     Hypotension     asymptomatic    Obstruction of fallopian tube     per pt has \"1 good tube\"    Peritoneal carcinomatosis ([address]     Weight loss     80lbs weight loss after gastric sleeve     Past Surgical History:   Procedure Laterality Date     SECTION      CYSTOSCOPY Bilateral 2022    CYSTOSCOPY BILATERAL RETROGRADE PYELOGRAM performed by Juan C Shi MD at [address] Bilateral 2022    BILATERAL CYSTOSCOPY URETERAL STENT EXCHANGE performed by Juan C Shi MD at [address] Bilateral 10/6/2022    CYSTOSCOPY BILATERAL URETERAL STENT REMOVAL, RIGHT URETERAL STENT PLACEMENT performed by Laurent Lazo MD at [facility] MAIN OR    GASTRIC BYPASS SURGERY  07/23/2014    gastric sleeve- Choudhari    HYSTERECTOMY (CERVIX STATUS UNKNOWN)      2018    HYSTERECTOMY (CERVIX STATUS UNKNOWN)  07/09/2020    TLH w/ Bilateral salpingectomy and left oophorectomy    IR PERC CATH PLEURAL DRAIN W/IMAG  9/26/2022    IR PERC CATH PLEURAL DRAIN W/IMAG 9/26/2022 [facility] SPECI[facility]    IR PORT PLACEMENT EQUAL OR GREATER THAN 5 YEARS  2/28/2022    IR PORT PLACEMENT EQUAL OR GREATER THAN 5 YEARS  2/28/2022    IR PORT PLACEMENT EQUAL OR GREATER THAN 5 YEARS 2/28/2022 [facility]OLOG[facility] SPECI[facility]    LAPAROTOMY N/A 8/17/2022    EXPLORATORY LAPAROTOMY/ ENTEROENTEROSTOMY performed by Josie Jennings MD at [facility]. [address]  age Rejeana Furry 21s\"    also \"unblocked her FT\"    TONSILLECTOMY      UPPER GASTROINTESTINAL ENDOSCOPY      with dilation    UPPER GASTROINTESTINAL ENDOSCOPY N/A 9/13/2022    EGD ESOPHAGOGASTRODUODENOSCOPY OFL 17 To IR after recovery for Para performed by Emmanuel Chery MD at [facility] ENDOSCOPY    UROLOGICAL SURGERY Bilateral 03/15/2022    cysto     Family History   Problem Relation Age of Onset    Heart Disease Maternal Grandmother     Hypertension Maternal Grandmother     Heart Disease Maternal Grandfather     Hypertension Maternal Grandfather     Pulmonary Embolism Neg Hx     Colon Cancer Neg Hx     Deep Vein Thrombosis Neg Hx     Ovarian Cancer Neg Hx     Prostate Cancer Neg Hx     Breast Cancer Neg Hx      Social History     Socioeconomic History    Marital status: Single     Spouse name: Not on file    Number of children: Not on file    Years of education: Not on file    Highest education level: Not on file   Occupational History    Not on file   Tobacco Use    Smoking status: Never    Smokeless tobacco: Never   Vaping Use    Vaping Use: Never used   Substance and Sexual Activity    Alcohol use: Not Currently    Drug use: No    Sexual activity: Not on file   Other Topics Concern    Not on file   Social History Narrative    1. Use large speculum. 2.  sister, Irish Trevizo #[id number] and mom is Chirag Zuleta #[id number] (both Kofoed's pts)  3.   PCP  Dr. Kamini Hollis ([facility]), GI Dr. Gerhardt Pu Perkins-2018 normal EGD     Social Determinants of Health     Financial Resource Strain: Not on file   Food Insecurity: Not on file   Transportation Needs: Not on file   Physical Activity: Not on file   Stress: Not on file   Social Connections: Not on file   Intimate Partner Violence: Not on file   Housing Stability: Not on file     Current Facility-Administered Medications   Medication Dose Route Frequency Provider Last Rate Last Admin    fluconazole (DIFLUCAN) tablet 200 mg  200 mg Oral Daily SCOTT Howe - CNP   200 mg at 10/23/22 1052    PN-Adult Premix 4.25/10 - Central Line   IntraVENous Continuous TPN Chris Watson MD 85 mL/hr at 10/23/22 1812 New Bag at 10/23/22 1812    0.9 % sodium chloride infusion   IntraVENous PRN Chris Watson MD        fat emulsion 20 % infusion 250 mL  250 mL IntraVENous Daily Chris Watson MD   Stopped at 10/24/22 0625    HYDROcodone-acetaminophen (NORCO) 5-325 MG per tablet 1 tablet  1 tablet Oral Q6H PRN SCOTT Howe - RIDGE        pantoprazole (PROTONIX) 40 mg in sodium chloride (PF) 10 mL injection  40 mg IntraVENous BID SCOTT Friend - NP   40 mg at 10/23/22 2056    sodium chloride flush 0.9 % injection 5-40 mL  5-40 mL IntraVENous 2 times per day Jazz Pramod, DO   10 mL at 10/23/22 2109    sodium chloride flush 0.9 % injection 5-40 mL  5-40 mL IntraVENous PRN Jazz Pramod, DO        0.9 % sodium chloride infusion   IntraVENous PRN Jazz Pramod, DO        ondansetron (ZOFRAN-ODT) disintegrating tablet 4 mg  4 mg Oral Q8H PRN Jazz Pramod, DO        Or    ondansetron (ZOFRAN) injection 4 mg  4 mg IntraVENous Q6H PRN Jazz Pramod, DO   4 mg at 10/24/22 0145    acetaminophen (TYLENOL) tablet 650 mg  650 mg Oral Q6H PRN Sri Sifuentes, DO        Or    acetaminophen (TYLENOL) suppository 650 mg  650 mg Rectal Q6H PRN Sri Sifuentes, DO        polyethylene glycol (GLYCOLAX) packet 17 g  17 g Oral Daily PRN Sri Sifuentes, DO        hyoscyamine (LEVSIN/SL) sublingual tablet 125 mcg  125 mcg SubLINGual Q4H PRN Sri Sifuentes, DO        glucose chewable tablet 16 g  4 tablet Oral PRN Sri Sifuentes, DO        dextrose bolus 10% 125 mL  125 mL IntraVENous PRN Sri Sifuentes, DO        Or    dextrose bolus 10% 250 mL  250 mL IntraVENous PRN Sri Sifuentes, DO        glucagon (rDNA) injection 1 mg  1 mg SubCUTAneous PRN Sri Sifuentes, DO        dextrose 10 % infusion   IntraVENous Continuous PRN Sri Sifuentes, DO        HYDROmorphone HCl PF (DILAUDID) injection 0.5 mg  0.5 mg IntraVENous Q4H PRN Sri Sifuentes, DO   0.5 mg at 10/24/22 0525    metoclopramide (REGLAN) injection 5 mg  5 mg IntraVENous q8h Sri Sifuentes, DO   5 mg at 10/24/22 0424    [Held by provider] apixaban (ELIQUIS) tablet 5 mg  5 mg Oral BID Sri Sifuentes, DO   5 mg at 10/22/22 2036       OBJECTIVE:  Patient Vitals for the past 8 hrs:   BP Temp Temp src Pulse Resp SpO2   10/24/22 0725 127/86 97.6 °F (36.4 °C) Oral 73 18 100 %   10/24/22 0405 116/86 98.3 °F (36.8 °C) Oral 83 16 --     Temp (24hrs), Av °F (36.7 °C), Min:97.6 °F (36.4 °C), Max:98.6 °F (37 °C)    10/24 0701 - 10/24 1900  In: -   Out: 350 [Urine:350]    Physical Exam:  Constitutional: Chronically ill-appearing female in no acute distress, sitting comfortably in the hospital bed. HEENT: Normocephalic and atraumatic. Oropharynx is clear, mucous membranes are moist.  Extraocular muscles are intact. Sclerae anicteric. Neck supple without JVD. No thyromegaly present. Skin Warm and dry. No bruising and no rash noted. No erythema. No pallor. Respiratory Lungs are clear to auscultation bilaterally without wheezes, rales or rhonchi, normal air exchange without accessory muscle use.     CVS Normal rate, regular rhythm and normal S1 and S2. No murmurs, gallops, or rubs. Abdomen Soft, nontender and nondistended, normoactive bowel sounds. No palpable mass. No hepatosplenomegaly. Neuro Grossly nonfocal with no obvious sensory or motor deficits. MSK Normal range of motion in general.  No edema and no tenderness. Psych Appropriate mood and affect.         Labs:    Recent Results (from the past 24 hour(s))   CBC with Auto Differential    Collection Time: 10/24/22  4:10 AM   Result Value Ref Range    WBC 4.0 (L) 4.3 - 11.1 K/uL    RBC 3.24 (L) 4.05 - 5.2 M/uL    Hemoglobin 9.2 (L) 11.7 - 15.4 g/dL    Hematocrit 28.9 (L) 35.8 - 46.3 %    MCV 89.2 82 - 102 FL    MCH 28.4 26.1 - 32.9 PG    MCHC 31.8 31.4 - 35.0 g/dL    RDW 19.1 (H) 11.9 - 14.6 %    Platelets 853 347 - 808 K/uL    MPV 10.9 9.4 - 12.3 FL    nRBC 0.02 0.0 - 0.2 K/uL    Differential Type AUTOMATED      Seg Neutrophils 70 43 - 78 %    Lymphocytes 23 13 - 44 %    Monocytes 5 4.0 - 12.0 %    Eosinophils % 2 0.5 - 7.8 %    Basophils 0 0.0 - 2.0 %    Immature Granulocytes 0 0.0 - 5.0 %    Segs Absolute 2.8 1.7 - 8.2 K/UL    Absolute Lymph # 0.9 0.5 - 4.6 K/UL    Absolute Mono # 0.2 0.1 - 1.3 K/UL    Absolute Eos # 0.1 0.0 - 0.8 K/UL    Basophils Absolute 0.0 0.0 - 0.2 K/UL    Absolute Immature Granulocyte 0.0 0.0 - 0.5 K/UL   Magnesium    Collection Time: 10/24/22  4:10 AM   Result Value Ref Range    Magnesium 1.8 1.8 - 2.4 mg/dL   Comprehensive Metabolic Panel    Collection Time: 10/24/22  4:10 AM   Result Value Ref Range    Sodium 135 133 - 143 mmol/L    Potassium 4.3 3.5 - 5.1 mmol/L    Chloride 108 101 - 110 mmol/L    CO2 27 21 - 32 mmol/L    Anion Gap 0 (L) 2 - 11 mmol/L    Glucose 119 (H) 65 - 100 mg/dL    BUN 12 6 - 23 MG/DL    Creatinine 0.40 (L) 0.6 - 1.0 MG/DL    Est, Glom Filt Rate >60 >60 ml/min/1.73m2    Calcium 7.9 (L) 8.3 - 10.4 MG/DL    Total Bilirubin 0.3 0.2 - 1.1 MG/DL    ALT 11 (L) 12 - 65 U/L    AST 19 15 - 37 U/L    Alk Phosphatase 63 50 - 136 U/L    Total Protein 5.3 (L) 6.3 - 8.2 g/dL    Albumin 2.2 (L) 3.5 - 5.0 g/dL    Globulin 3.1 2.8 - 4.5 g/dL    Albumin/Globulin Ratio 0.7 0.4 - 1.6     Phosphorus    Collection Time: 10/24/22  4:10 AM   Result Value Ref Range    Phosphorus 3.4 2.5 - 4.5 MG/DL   Triglyceride    Collection Time: 10/24/22  4:10 AM   Result Value Ref Range    Triglycerides 442 (H) 35 - 150 MG/DL   LDL Cholesterol, Direct    Collection Time: 10/24/22  4:10 AM   Result Value Ref Range    LDL Direct 58 <100 mg/dl       Imaging:  Xray Result (most recent):  XR CHEST LIMITED ONE VIEW 10/20/2022    Narrative  Chest X-ray    INDICATION: Chest pain    AP/PA view of the chest was obtained. FINDINGS: The lungs are clear. There are no infiltrates or effusions. The heart  size is normal.  Vascular port is present. Impression  No acute findings in the chest    CT Result (most recent):  CT ABDOMEN PELVIS W IV CONTRAST 10/20/2022    Narrative  CT OF THE ABDOMEN AND PELVIS    INDICATION: Abdominal pain and nausea, history of peritoneal carcinomatosis. Multiple axial images were obtained through the abdomen and pelvis. Oral  contrast was used for bowel opacification. 100mL of Isovue 370 intravenous  contrast was used for better evaluation of solid organs and vascular structures. Radiation dose reduction techniques were used for this study. All CT scans  performed at this facility use one or all of the following: Automated exposure  control, adjustment of the mA and/or kVp according to patient's size, iterative  reconstruction. COMPARISON: 10/03/2022    FINDINGS:  - LUNG BASES: Small left pleural effusion. Right lung base is clear.    - LIVER: Normal in size and appearance. - GALLBLADDER/BILE DUCTS: Stable mild bile duct dilatation.  - PANCREAS: Normal.  - SPLEEN: Small, mottled enhancement    - ADRENALS: Normal.  - KIDNEYS/URETERS: Right ureteral stent is in place. Decreased right  hydronephrosis.   Left ureteral stent has been removed. Only mild residual  prominence of the left collecting system. - BLADDER: Distal end of the right ureteral stent is in the bladder. While  diffuse bladder wall thickening.  - REPRODUCTIVE ORGANS: Post hysterectomy.    - BOWEL: Mild diffuse bowel wall thickening, both small bowel and colon. Esophagus is distended. No bowel distention.  - LYMPH NODES: No significant retroperitoneal, mesenteric, or pelvic adenopathy.  - BONES: No fracture or significant bone lesion.  - VASCULATURE: Normal  - OTHER: Peritoneal nodularity is again noted. There is increased ascites. Percutaneous strain is present in the right abdomen. Impression  1. Peritoneal carcinomatosis and increased ascites. 2.  Persistent diffuse bowel wall thickening. 3.  Persistent but improved bilateral hydronephrosis. 4.  Diffuse bladder wall thickening.       If there are any questions about this report, I can be reached on [employer]  or at [phone]      ASSESSMENT:  Patient Active Problem List   Diagnosis    Fibroids    Paraesophageal hernia    Menorrhagia with regular cycle    Papanicolaou smear of cervix with low grade squamous intraepithelial lesion (LGSIL)    Iron deficiency anemia due to chronic blood loss    Anemia    History of weight loss surgery    Carcinoma of unknown primary ([address]    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 ([address]    Bilateral hydronephrosis    Peritoneal carcinoma ([address]    Peritoneal carcinomatosis ([address]    Hematemesis    Current use of long term anticoagulation    Intractable vomiting    Generalized abdominal pain    LFT elevation    Obstruction of both ureters    Abdominal pain    Hypokalemia    Hypomagnesemia    Hypoalbuminemia    GERD (gastroesophageal reflux disease)    Colitis    Intractable vomiting with nausea    Fever    CINV (chemotherapy-induced nausea and vomiting)    Intractable nausea and vomiting    Severe protein-calorie malnutrition ([address]    Dysphagia    Metastatic carcinoma ([address] Ms. Walker is a 52 y.o. female admitted on 10/20/2022. The primary encounter diagnosis was Dysphagia, unspecified type. Diagnoses of Chronic abdominal pain and Metastatic carcinoma ([address] were also pertinent to this visit. Todd Baldwin Her PMH includes sleeve gastrectomy, hydronephrosis s/p ureteral stents with recent R stent exchange with unsuccessful L exchange d/t tumor, recent GIB with neg EGD, recurrent ascites s/p pleurx drain 9/26, DVT on Eliquis, and hx esophageal strictures. She is a patient of Dr Paulino Neff with metastatic carcinoma of unknown pimary with known peritoneal carcinomatosis on diagnosis. She completed C9 FOLFOX/Avastin on 9/8/22. Treatment recently changed d/t POD. She is s/p C1D8 paclitaxel/Cyramza on 10/20. She has had multiple recent admissions for intractable nausea and vomiting, most recently from 10/3 - 10/7/22 for colitis vs serosal implants of tumor. She was given CTX/Flagyl and then changed to LVQ/Flagyl. She was discharged home on Flagyl/Cipro. She returned to ED on 10/20 with c/o nausea, vomiting, and abd pain. She reports difficulty swallowing foods and liquids. Feels like food gets stuck for the past 3 days, causing her to vomit any time she eats. CT AP with peritoneal carcinomatosis and increased ascites; persistent diffuse bowel wall thickening; persistent but improved b/l hydronephrosis; and diffuse bladder wall thickening. CXR neg. GI following, plans for EGD with possible dilation today. We were asked for recommendations for our patient.       PLAN:  Metastatic carcinoma of unknown primary / peritoneal carcinomatosis  - s/p FOLFOX/Avastin with POD  - s/p C1D8 paclitaxel/Cyramza on 10/20    Nausea / Vomiting / Dysphagia  - CT AP with peritoneal carcinomatosis and increased ascites; persistent diffuse bowel wall thickening; persistent but improved b/l hydronephrosis; and diffuse bladder wall thickening  - GI following, EGD with possible dilation today  - If no improvement after EGD, will consider TPN  - RD consulted  - Check UA   10/22 s/p EGD yesterday with erosive esophagitis, proximal esophageal stricture, and Schatzki ring. On PPI BID. Start TPN. 10/23 Continues on TPN. 10/24 patient is not a candidate for feeding tube due to history of gastric bypass and due to peritoneal carcinomatosis    UTI / Hematuria  - UA c/w UTI  - Ucx ordered  - Start CTX  10/22 Ucx pending. Con't CTX (D2).  10/23 Ucx +yeast, I/S pending. Start Diflucan. Completes CTX today (D3). Gross hematuria noted. Hold Eliquis    Hx DVT  - on Eliquis  10/23 Hold Eliquis d/t hematuria    Continue home meds  Andres SOPs  AC contraindicated d/t hematuria    Goals and plan of care reviewed with the patient. All questions answered to the best of our ability. Disposition:  TBD pending clinical course. Pt hopeful for discharge before Wed as she wants to get her scheduled tx on Thurs. PT/OT recommend .             SCOTT Beltran - NP   [facility] Hematology & Oncology  [address] [address]  Office : [phone]  Fax : [phone]

## 2022-10-24 NOTE — PROGRESS NOTES
Comprehensive Nutrition Assessment    Type and Reason for Visit: Reassess  TPN Management (Oncology) and Poor Intake/Appetite 5 or More Days (Hospitalists)    Nutrition Recommendations/Plan:   Meals and Snacks:  Diet: Continue current order GI Soft for pleasure   Nutrition Supplement Therapy:  Medical food supplement therapy:  Continue Ensure Clear three times per day (this provides 240 kcal and 8 grams protein per bottle)  Parenteral Nutrition:  Central parenteral nutrition  begins at 1800 today  Continue: Dex 10% , 4.25% AA 2 L (85ml/hr)   Hold 250 ml 20% lipids and reassess triglyceride lab in AM  To provide: 1120 kcal/d (75% of needs), 85 grams of protein/d (100% of needs), 200 grams of CHO/d and ~2000 ml of total volume/d. Lytes/L:   Sodium ( 85 meq NaCl and 65 meq NaAcetate), Potassium ( 15 meq KCl) Phosphorus ( 12.5 mmol KPO4), Calcium (4.5 meq), Magnesium 10 meq   Solution formulated to be 1:1 Cl: Acetate ratio. Other additives:   MVI Monday Wednesday Friday due to national shortages, MTE  Nutrition Related Medication Management: Thiamine  mg x 7 days (day 3 of 7 days)  Labs:   CMP daily per MD  Mg daily per MD  Phos daily x 3 days then MWF    Triglyceride tomorrow  POC Glucoses/SSI Not indicated     Malnutrition Assessment:  Malnutrition Status: Severe malnutrition  Context: Chronic Illness  Findings of clinical characteristics of malnutrition:   Energy Intake:  Mild decrease in energy intake (Comment) (unable to tolerate even water for unknown time frame)  Weight Loss:  Greater than 20% over 1 year (42# (30.9%))     Body Fat Loss:  Severe body fat loss Triceps, Fat Overlying Ribs   Muscle Mass Loss:  Severe muscle mass loss Temples (temporalis), Clavicles (pectoralis & deltoids), Scapula (trapezius)  Fluid Accumulation:  No significant fluid accumulation     Strength:  Not Performed     Nutrition Assessment:  Nutrition History: Patient known to RD from recent admission.  She was eating normally prior to recent admission. Usual intake was 3 meals per day. She has historically not tolerated Ensure/Boost products. PO intake last admission was 1-25% of meals but did drink Ensure Clear well. Today she states that she has been having difficulty swallowing and everything she tried to eat or drink has been coming back up. She states that is progressively got worse until she was unable to tolerate even water. Do You Have Any Cultural, Buddhist, or Ethnic Food Preferences?: Yes (Comment)   Nutrition Background:       Patient with PMH significant for gastric sleeve, hydronephrosis with stents and recent right stent exchange (left unable to be exchanged due to tumor), metastatic adeno with suspected GI origin with peritoneal carcinomatosis on diagnosis, debulking unsuccessful, recent GIB, DVT, Pleurx placement 9/26/22. She presented with abdominal pain, difficulty swallowing \"stuck in throat and vomits. \" She was supposed to have outpt EGD but presented to ER with intractable nausea and vomiting. She is now s/p EGD 10/21 with findings of LA grade D esophagitis, proximal esophageal stricture, Sckatzki's ring. Nutrition Interval:  Right single lumen port in place. Dilute TPN initiated 10/222 with lipids. Per GI op note from EGD 10/22 \"Full liquids today; advance tomorrow as tolerated. \" TPN to goal 10/23. Diet liberalized to GI Soft, primarily for pleasure. Pt seen at bedside, TPN infusing per ordered rate. Pt with questions again regarding TPN, RD answered appropriately. Pt reports she is not \"pushing it\" with PO intake, rather eating for taste/pleasure. Discussed pt with Rob Pablo RN.      Abdominal Status (last documented):   Last BM (including prior to admit): 10/20/22, GI Symptoms: Loss of appetite, Nausea, Vomiting   Pertinent Medications: Rocephin, Reglan, Zofran PRN (utilizing daily), Protonix  Electrolyte replacements: 2 gm Mag 10/22  Pertinent Labs:   Lab Results   Component Value Date/Time  10/24/2022 10:19 AM    K 4.1 10/24/2022 10:19 AM     10/24/2022 10:19 AM    CO2 27 10/24/2022 10:19 AM    BUN 13 10/24/2022 10:19 AM    CREATININE 0.40 10/24/2022 10:19 AM    GLUCOSE 110 10/24/2022 10:19 AM    CALCIUM 8.1 10/24/2022 10:19 AM    PHOS 3.2 10/24/2022 10:19 AM    MG 1.7 10/24/2022 10:19 AM     Lab Results   Component Value Date/Time    TRIG 442 10/24/2022 04:10 AM       Remarkable for: elevated triglyceride - ? Related to lab draw time - repeat TG tomorrow am again, holding lipids tonight. First lab draw hemolyzed this AM, labs redrawn. Mg low. Awaiting replacement. CO2 and Cl WNL. Current Nutrition Therapies:  ADULT ORAL NUTRITION SUPPLEMENT; Breakfast, Lunch, Dinner; Clear Liquid Oral Supplement  ADULT ORAL NUTRITION SUPPLEMENT; Lunch, Dinner; Low Calorie/High Protein Oral Supplement  ADULT DIET; Regular; GI Bracken (GERD/Peptic Ulcer)  PN-Adult Premix 4.25/10 - Central Line  Current Parenteral Nutrition Orders:  Type and Formula: Premix Central (DEX 10%, AA 4.25%)   Lipids: 250ml, Daily  Duration: Continuous  Rate/Volume: 2L (85 ml/hr)  Current PN Order Provides: @ goal  Goal PN Orders Provides: 1520 kcal/d (100% of needs), 85 grams of protein/d (100% of needs), 200 grams of CHO/d and ~2250 ml of total volume/d.       Current Intake:   Average Meal Intake:  (bites and sips) Average Supplements Intake: 26-50%      Anthropometric Measures:  Height: 5' 4\" (162.6 cm)  Current Body Wt: 105 lb 9.6 oz (47.9 kg) (10/23), Weight source: Standing Scale  BMI: 18.1, Underweight (BMI less than 18.5)  Admission Body Weight: 92 lb (41.7 kg) (10/20 office wt)  Ideal Body Weight (Kg) (Calculated): 55 kg (120 lbs), 78.3 %  Usual Body Wt: 136 lb (61.7 kg) (10/19/21 office visit), Percent weight change: -30.9       Edema:    BMI Category Underweight (BMI less than 18.5)    Estimated Daily Nutrient Needs:  Energy (kcal/day): 7055-1894 (Kcal/kg (35-40) Weight used: 42.6 kg Current  Protein (g/day): 64-85 (1.2-5 g/kg) Weight Used: (Current) 42.6 kg  Fluid (ml/day):   (1 ml/kcal)    Nutrition Diagnosis:   Inadequate oral intake related to swallowing difficulty as evidenced by  (reported barrier to PO, intake as above, erosive esophagitis)    Severe malnutrition related to inadequate protein-energy intake as evidenced by Criteria as identified in malnutrition assessment    Nutrition Interventions:   Food and/or Nutrient Delivery: Continue Current Diet, Continue Oral Nutrition Supplement, Modify Parenteral Nutrition     Coordination of Nutrition Care: Continue to monitor while inpatient       Goals:   Previous Goal Met: Goal(s) Achieved  Active Goal:  (Continue to tolerate nutrition support at goal rate)       Nutrition Monitoring and Evaluation:      Food/Nutrient Intake Outcomes: Food and Nutrient Intake, Parenteral Nutrition Intake/Tolerance  Physical Signs/Symptoms Outcomes: Biochemical Data, GI Status, Hemodynamic Status, Weight    Discharge Planning:    Parenteral Nutrition    Jacquelyn Lyons, [address] [facility]

## 2022-10-24 NOTE — PROGRESS NOTES
Comprehensive Nutrition Assessment    Type and Reason for Visit: Reassess  TPN Management (Oncology) and Poor Intake/Appetite 5 or More Days (Hospitalists)    Nutrition Recommendations/Plan:   Meals and Snacks:  Diet: Liberalize Regular, GI soft  Nutrition Supplement Therapy:  Medical food supplement therapy:  Continue Ensure Clear three times per day (this provides 240 kcal and 8 grams protein per bottle)  Discontinue Ensure High Protein  Parenteral Nutrition:  Central parenteral nutrition  begins at 1800 today  Advance: Dex 10% , 4.25% AA 2 L (85ml/hr)   Continue 250 ml 20% lipids daily  To provide: 1520 kcal/d (100% of needs), 85 grams of protein/d (100% of needs), 200 grams of CHO/d and ~2250 ml of total volume/d. Lytes/L:   Sodium ( 85 meq NaCl and 65 meq NaAcetate), Potassium ( 15 meq KCl) Phosphorus ( 12.5 mmol KPO4), Calcium (4.5 meq), Magnesium 8 meq   Solution formulated to be 1:1 Cl: Acetate ratio. K additives adjusted to maintain same daily meq with increase in PN volume  Other additives:   MVI Monday Wednesday Friday due to [employer], MTE  Nutrition Related Medication Management:   Thiamine  mg x 7 days (day 2 of 7 days)  Labs:   CMP daily per MD  Mg daily per MD  Phos daily x 3 days then MWF    Triglyceride tomorrow  POC Glucoses/SSI Not indicated     Malnutrition Assessment:  Malnutrition Status: Severe malnutrition  Context: Chronic Illness  Findings of clinical characteristics of malnutrition:   Energy Intake:  Mild decrease in energy intake (Comment) (unable to tolerate even water for unknown time frame)  Weight Loss:  Greater than 20% over 1 year (42# (30.9%))     Body Fat Loss:  Severe body fat loss Triceps, Fat Overlying Ribs   Muscle Mass Loss:  Severe muscle mass loss Temples (temporalis), Clavicles (pectoralis & deltoids), Scapula (trapezius)  Fluid Accumulation:  No significant fluid accumulation     Strength:  Not Performed     Nutrition Assessment:  Nutrition History: Patient known to RD from recent admission. She was eating normally prior to recent admission. Usual intake was 3 meals per day. She has historically not tolerated Ensure/Boost products. PO intake last admission was 1-25% of meals but did drink Ensure Clear well. Today she states that she has been having difficulty swallowing and everything she tried to eat or drink has been coming back up. She states that is progressively got worse until she was unable to tolerate even water. 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 She presented with abdominal pain, difficulty swallowing \"stuck in throat and vomits. \" She was supposed to have outpt EGD but presented to ER with intractable nausea and vomiting. She is now s/p EGD 10/21 with findings of LA grade D esophagitis, proximal esophageal stricture, Sckatzki's ring. Nutrition Interval:  Right single lumen port in place. Dilute TPN initiated 10/222 with lipids. Per GI op note from EGD 10/22 \"Full liquids today; advance tomorrow as tolerated. \"    Patient seen sitting up in bed eating a popsicle. She had many questions regarding TPN which were answered. She states that she feels \"different\" since TPN initiated. When asked what she means, she states she has more energy. She asks if she can still eat on TPN. Explained that TPN will meet all her needs but she can still eat ad vicki. She was very pleased today. Discussed with Red Armas RN and Dr. Evert Up. Will advance diet to easy to chew with GI soft restrictions.     Abdominal Status (last documented):   Last BM (including prior to admit): 10/20/22, GI Symptoms: Loss of appetite, Nausea, Vomiting   Pertinent Medications: Rocephin, Reglan, Zofran PRN (utilizing daily), Protonix  Electrolyte replacements: 2 gm Mag 10/22  Pertinent Labs:   Lab Results   Component Value Date/Time     10/24/2022 04:10 AM    K 4.3 10/24/2022 04:10 AM     10/24/2022 04:10 AM    CO2 27 10/24/2022 04:10 AM    BUN 12 10/24/2022 04:10 AM    CREATININE 0.40 10/24/2022 04:10 AM    GLUCOSE 119 10/24/2022 04:10 AM    CALCIUM 7.9 10/24/2022 04:10 AM    PHOS 3.4 10/24/2022 04:10 AM    MG 1.8 10/24/2022 04:10 AM     Lab Results   Component Value Date/Time    TRIG 442 10/24/2022 04:10 AM       Remarkable for: elevated triglyceride - ? Related to lab draw time - repeat TG tomorrow am, CO2 trending down    Current Nutrition Therapies:  ADULT ORAL NUTRITION SUPPLEMENT; Breakfast, Lunch, Dinner; Clear Liquid Oral Supplement  ADULT ORAL NUTRITION SUPPLEMENT; Lunch, Dinner; Low Calorie/High Protein Oral Supplement  ADULT DIET; Regular; GI [place] (GERD/Peptic Ulcer)  PN-Adult Premix 4.25/10 - Central Line  Current Parenteral Nutrition Orders:  Type and Formula: Premix Central (DEX 10%, AA 4.25%)   Lipids: 250ml, Daily  Duration: Continuous  Rate/Volume: 2L (85 ml/hr)  Current PN Order Provides: @ goal  Goal PN Orders Provides: 1520 kcal/d (100% of needs), 85 grams of protein/d (100% of needs), 200 grams of CHO/d and ~2250 ml of total volume/d.       Current Intake:   Average Meal Intake:  (bites and sips) Average Supplements Intake: 26-50%      Anthropometric Measures:  Height: 5' 4\" (162.6 cm)  Current Body Wt: 105 lb 9.6 oz (47.9 kg) (10/23), Weight source: Standing Scale  BMI: 18.1, Underweight (BMI less than 18.5)  Admission Body Weight: 92 lb (41.7 kg) (10/20 office wt)  Ideal Body Weight (Kg) (Calculated): 55 kg (120 lbs), 78.3 %  Usual Body Wt: 136 lb (61.7 kg) (10/19/21 office visit), Percent weight change: -30.9       Edema:    BMI Category Underweight (BMI less than 18.5)  Estimated Daily Nutrient Needs:  Energy (kcal/day): 6566-3903 (Kcal/kg (35-40) Weight used: 42.6 kg Current  Protein (g/day): 64-85 (1.2-5 g/kg) Weight Used: (Current) 42.6 kg  Fluid (ml/day):   (1 ml/kcal)    Nutrition Diagnosis:   Inadequate oral intake related to swallowing difficulty as evidenced by  (reported barrier to PO, intake as above, erosive esophagitis)  Severe malnutrition related to inadequate protein-energy intake as evidenced by Criteria as identified in malnutrition assessment    Nutrition Interventions:   Food and/or Nutrient Delivery: Continue Current Diet, Continue Oral Nutrition Supplement, Modify Parenteral Nutrition     Coordination of Nutrition Care: Continue to monitor while inpatient       Goals:   Previous Goal Met: Goal(s) Achieved  Active Goal:  (Continue to tolerate nutrition support at goal rate)       Nutrition Monitoring and Evaluation:      Food/Nutrient Intake Outcomes: Food and Nutrient Intake, Parenteral Nutrition Intake/Tolerance  Physical Signs/Symptoms Outcomes: Biochemical Data, GI Status, Hemodynamic Status, Weight    Discharge Planning:    Parenteral Nutrition    Mariano Dorado

## 2022-10-24 NOTE — PROGRESS NOTES
Occupational Therapy Note:    Attempted to see patient this PM for occupational therapy treatment  session. Patient declined to participate stating she was too nauseous. Will follow and re-attempt as schedule permits/patient available.  Thank you,    Laura Christianson, OT    Rehab Caseload Tracker

## 2022-10-24 NOTE — CARE COORDINATION
CM received a consult with updates that pt will be d/c home with home TPN. CM submitted a referral to [facility]. Pt is current with [facility] and they are following pt's progress while she is hospitalized. CM will continue to follow. LOS = 4 days.

## 2022-10-24 NOTE — PROGRESS NOTES
End of Shift Note: 7p ~7a    Continues to require IV dilaudid for pain control Q 4hrs. TPN and Lipids infusing without difficulty. Urine remains bloody. No stool this shift. Afebrile. Continuing with current POC. Report at [facility].

## 2022-10-24 NOTE — PROGRESS NOTES
Physical Therapy Note:    Attempted to see patient this PM for physical therapy treatment  session. Patient politely refused stating she would like to nap and was up walking earlier today. Will follow and re-attempt as schedule permits/patient available.  Thank you,    Monica Perkins 89

## 2022-10-25 ENCOUNTER — TELEPHONE (OUTPATIENT)
Dept: ONCOLOGY | Age: 47
End: 2022-10-25

## 2022-10-25 PROBLEM — K59.00 CONSTIPATION: Status: ACTIVE | Noted: 2022-10-25

## 2022-10-25 LAB
25(OH)D3 SERPL-MCNC: 23.9 NG/ML (ref 30–100)
ALBUMIN SERPL-MCNC: 2.4 G/DL (ref 3.5–5)
ALBUMIN/GLOB SERPL: 0.8 {RATIO} (ref 0.4–1.6)
ALP SERPL-CCNC: 63 U/L (ref 50–136)
ALT SERPL-CCNC: 9 U/L (ref 12–65)
ANION GAP SERPL CALC-SCNC: 4 MMOL/L (ref 2–11)
AST SERPL-CCNC: 12 U/L (ref 15–37)
BASOPHILS # BLD: 0 K/UL (ref 0–0.2)
BASOPHILS NFR BLD: 0 % (ref 0–2)
BILIRUB SERPL-MCNC: 0.3 MG/DL (ref 0.2–1.1)
BUN SERPL-MCNC: 12 MG/DL (ref 6–23)
CALCIUM SERPL-MCNC: 8.4 MG/DL (ref 8.3–10.4)
CHLORIDE SERPL-SCNC: 104 MMOL/L (ref 101–110)
CO2 SERPL-SCNC: 30 MMOL/L (ref 21–32)
CREAT SERPL-MCNC: 0.3 MG/DL (ref 0.6–1)
DIFFERENTIAL METHOD BLD: ABNORMAL
EOSINOPHIL # BLD: 0.1 K/UL (ref 0–0.8)
EOSINOPHIL NFR BLD: 3 % (ref 0.5–7.8)
ERYTHROCYTE [DISTWIDTH] IN BLOOD BY AUTOMATED COUNT: 19 % (ref 11.9–14.6)
FERRITIN SERPL-MCNC: 109 NG/ML (ref 8–388)
FOLATE SERPL-MCNC: 5.5 NG/ML (ref 3.1–17.5)
GLOBULIN SER CALC-MCNC: 3 G/DL (ref 2.8–4.5)
GLUCOSE SERPL-MCNC: 79 MG/DL (ref 65–100)
HCT VFR BLD AUTO: 28.5 % (ref 35.8–46.3)
HGB BLD-MCNC: 8.8 G/DL (ref 11.7–15.4)
IMM GRANULOCYTES # BLD AUTO: 0 K/UL (ref 0–0.5)
IMM GRANULOCYTES NFR BLD AUTO: 1 % (ref 0–5)
IRON SATN MFR SERPL: 37 %
IRON SERPL-MCNC: 48 UG/DL (ref 35–150)
LYMPHOCYTES # BLD: 1.2 K/UL (ref 0.5–4.6)
LYMPHOCYTES NFR BLD: 30 % (ref 13–44)
MAGNESIUM SERPL-MCNC: 2 MG/DL (ref 1.8–2.4)
MCH RBC QN AUTO: 27.2 PG (ref 26.1–32.9)
MCHC RBC AUTO-ENTMCNC: 30.9 G/DL (ref 31.4–35)
MCV RBC AUTO: 88 FL (ref 82–102)
MONOCYTES # BLD: 0.2 K/UL (ref 0.1–1.3)
MONOCYTES NFR BLD: 5 % (ref 4–12)
NEUTS SEG # BLD: 2.5 K/UL (ref 1.7–8.2)
NEUTS SEG NFR BLD: 61 % (ref 43–78)
NRBC # BLD: 0 K/UL (ref 0–0.2)
PHOSPHATE SERPL-MCNC: 3.3 MG/DL (ref 2.5–4.5)
PLATELET # BLD AUTO: 200 K/UL (ref 150–450)
PMV BLD AUTO: 10.7 FL (ref 9.4–12.3)
POTASSIUM SERPL-SCNC: 4.2 MMOL/L (ref 3.5–5.1)
PROT SERPL-MCNC: 5.4 G/DL (ref 6.3–8.2)
RBC # BLD AUTO: 3.24 M/UL (ref 4.05–5.2)
SODIUM SERPL-SCNC: 138 MMOL/L (ref 133–143)
TIBC SERPL-MCNC: 131 UG/DL (ref 250–450)
TRIGL SERPL-MCNC: 148 MG/DL (ref 35–150)
VIT B12 SERPL-MCNC: 520 PG/ML (ref 193–986)
WBC # BLD AUTO: 4.1 K/UL (ref 4.3–11.1)

## 2022-10-25 PROCEDURE — 36591 DRAW BLOOD OFF VENOUS DEVICE: CPT

## 2022-10-25 PROCEDURE — 84100 ASSAY OF PHOSPHORUS: CPT

## 2022-10-25 PROCEDURE — 6370000000 HC RX 637 (ALT 250 FOR IP): Performed by: INTERNAL MEDICINE

## 2022-10-25 PROCEDURE — 1100000000 HC RM PRIVATE

## 2022-10-25 PROCEDURE — 83540 ASSAY OF IRON: CPT

## 2022-10-25 PROCEDURE — 6370000000 HC RX 637 (ALT 250 FOR IP): Performed by: NURSE PRACTITIONER

## 2022-10-25 PROCEDURE — 82607 VITAMIN B-12: CPT

## 2022-10-25 PROCEDURE — 2580000003 HC RX 258: Performed by: FAMILY MEDICINE

## 2022-10-25 PROCEDURE — A4216 STERILE WATER/SALINE, 10 ML: HCPCS | Performed by: NURSE PRACTITIONER

## 2022-10-25 PROCEDURE — 6360000002 HC RX W HCPCS: Performed by: NURSE PRACTITIONER

## 2022-10-25 PROCEDURE — 2580000003 HC RX 258: Performed by: NURSE PRACTITIONER

## 2022-10-25 PROCEDURE — 6360000002 HC RX W HCPCS: Performed by: FAMILY MEDICINE

## 2022-10-25 PROCEDURE — 82728 ASSAY OF FERRITIN: CPT

## 2022-10-25 PROCEDURE — 97530 THERAPEUTIC ACTIVITIES: CPT

## 2022-10-25 PROCEDURE — 36592 COLLECT BLOOD FROM PICC: CPT

## 2022-10-25 PROCEDURE — 82306 VITAMIN D 25 HYDROXY: CPT

## 2022-10-25 PROCEDURE — 97535 SELF CARE MNGMENT TRAINING: CPT

## 2022-10-25 PROCEDURE — 82746 ASSAY OF FOLIC ACID SERUM: CPT

## 2022-10-25 PROCEDURE — 2500000003 HC RX 250 WO HCPCS: Performed by: INTERNAL MEDICINE

## 2022-10-25 PROCEDURE — 85025 COMPLETE CBC W/AUTO DIFF WBC: CPT

## 2022-10-25 PROCEDURE — 99232 SBSQ HOSP IP/OBS MODERATE 35: CPT | Performed by: INTERNAL MEDICINE

## 2022-10-25 PROCEDURE — 84478 ASSAY OF TRIGLYCERIDES: CPT

## 2022-10-25 PROCEDURE — 83735 ASSAY OF MAGNESIUM: CPT

## 2022-10-25 PROCEDURE — 80053 COMPREHEN METABOLIC PANEL: CPT

## 2022-10-25 PROCEDURE — C9113 INJ PANTOPRAZOLE SODIUM, VIA: HCPCS | Performed by: NURSE PRACTITIONER

## 2022-10-25 RX ORDER — FLUCONAZOLE 2 MG/ML
200 INJECTION, SOLUTION INTRAVENOUS EVERY 24 HOURS
Status: DISCONTINUED | OUTPATIENT
Start: 2022-10-26 | End: 2022-10-26 | Stop reason: HOSPADM

## 2022-10-25 RX ORDER — LORAZEPAM 2 MG/ML
0.5 INJECTION INTRAMUSCULAR EVERY 6 HOURS PRN
Status: DISCONTINUED | OUTPATIENT
Start: 2022-10-25 | End: 2022-10-26 | Stop reason: HOSPADM

## 2022-10-25 RX ORDER — LACTULOSE 10 G/15ML
20 SOLUTION ORAL ONCE
Status: COMPLETED | OUTPATIENT
Start: 2022-10-25 | End: 2022-10-25

## 2022-10-25 RX ORDER — LORAZEPAM 2 MG/ML
0.5 INJECTION INTRAMUSCULAR ONCE
Status: COMPLETED | OUTPATIENT
Start: 2022-10-25 | End: 2022-10-25

## 2022-10-25 RX ORDER — LORAZEPAM 0.5 MG/1
0.5 TABLET ORAL ONCE
Status: DISCONTINUED | OUTPATIENT
Start: 2022-10-25 | End: 2022-10-25

## 2022-10-25 RX ORDER — FOLIC ACID 1 MG/1
1 TABLET ORAL DAILY
Status: DISCONTINUED | OUTPATIENT
Start: 2022-10-25 | End: 2022-10-26 | Stop reason: HOSPADM

## 2022-10-25 RX ORDER — ERGOCALCIFEROL 1.25 MG/1
50000 CAPSULE ORAL WEEKLY
Status: DISCONTINUED | OUTPATIENT
Start: 2022-10-25 | End: 2022-10-26

## 2022-10-25 RX ADMIN — FOLIC ACID 1 MG: 1 TABLET ORAL at 10:31

## 2022-10-25 RX ADMIN — SOYBEAN OIL 250 ML: 20 INJECTION, SOLUTION INTRAVENOUS at 18:37

## 2022-10-25 RX ADMIN — METOCLOPRAMIDE HYDROCHLORIDE 5 MG: 5 INJECTION INTRAMUSCULAR; INTRAVENOUS at 20:39

## 2022-10-25 RX ADMIN — ONDANSETRON 4 MG: 2 INJECTION INTRAMUSCULAR; INTRAVENOUS at 09:29

## 2022-10-25 RX ADMIN — POLYETHYLENE GLYCOL 3350 17 G: 17 POWDER, FOR SOLUTION ORAL at 10:32

## 2022-10-25 RX ADMIN — ONDANSETRON 4 MG: 2 INJECTION INTRAMUSCULAR; INTRAVENOUS at 14:55

## 2022-10-25 RX ADMIN — SODIUM CHLORIDE, PRESERVATIVE FREE 10 ML: 5 INJECTION INTRAVENOUS at 20:40

## 2022-10-25 RX ADMIN — CALCIUM GLUCONATE: 98 INJECTION, SOLUTION INTRAVENOUS at 18:37

## 2022-10-25 RX ADMIN — CALCIUM CARBONATE (ANTACID) CHEW TAB 500 MG 500 MG: 500 CHEW TAB at 05:27

## 2022-10-25 RX ADMIN — PANTOPRAZOLE SODIUM 40 MG: 40 INJECTION, POWDER, LYOPHILIZED, FOR SOLUTION INTRAVENOUS at 20:39

## 2022-10-25 RX ADMIN — CALCIUM CARBONATE (ANTACID) CHEW TAB 500 MG 500 MG: 500 CHEW TAB at 18:47

## 2022-10-25 RX ADMIN — HYDROMORPHONE HYDROCHLORIDE 0.5 MG: 1 INJECTION, SOLUTION INTRAMUSCULAR; INTRAVENOUS; SUBCUTANEOUS at 20:38

## 2022-10-25 RX ADMIN — LACTULOSE 20 G: 20 SOLUTION ORAL at 13:02

## 2022-10-25 RX ADMIN — LORAZEPAM 0.5 MG: 2 INJECTION INTRAMUSCULAR at 18:47

## 2022-10-25 RX ADMIN — METOCLOPRAMIDE HYDROCHLORIDE 5 MG: 5 INJECTION INTRAMUSCULAR; INTRAVENOUS at 13:02

## 2022-10-25 RX ADMIN — HYDROMORPHONE HYDROCHLORIDE 0.5 MG: 1 INJECTION, SOLUTION INTRAMUSCULAR; INTRAVENOUS; SUBCUTANEOUS at 05:10

## 2022-10-25 RX ADMIN — FLUCONAZOLE 200 MG: 100 TABLET ORAL at 09:20

## 2022-10-25 RX ADMIN — PANTOPRAZOLE SODIUM 40 MG: 40 INJECTION, POWDER, LYOPHILIZED, FOR SOLUTION INTRAVENOUS at 09:29

## 2022-10-25 RX ADMIN — HYDROMORPHONE HYDROCHLORIDE 0.5 MG: 1 INJECTION, SOLUTION INTRAMUSCULAR; INTRAVENOUS; SUBCUTANEOUS at 09:28

## 2022-10-25 RX ADMIN — LORAZEPAM 0.5 MG: 2 INJECTION INTRAMUSCULAR at 10:31

## 2022-10-25 RX ADMIN — DOCUSATE SODIUM 50 MG AND SENNOSIDES 8.6 MG 2 TABLET: 8.6; 5 TABLET, FILM COATED ORAL at 09:20

## 2022-10-25 RX ADMIN — ERGOCALCIFEROL 50000 UNITS: 1.25 CAPSULE ORAL at 20:39

## 2022-10-25 RX ADMIN — SODIUM CHLORIDE, PRESERVATIVE FREE 10 ML: 5 INJECTION INTRAVENOUS at 09:33

## 2022-10-25 RX ADMIN — METOCLOPRAMIDE HYDROCHLORIDE 5 MG: 5 INJECTION INTRAMUSCULAR; INTRAVENOUS at 05:10

## 2022-10-25 RX ADMIN — HYDROMORPHONE HYDROCHLORIDE 0.5 MG: 1 INJECTION, SOLUTION INTRAMUSCULAR; INTRAVENOUS; SUBCUTANEOUS at 15:03

## 2022-10-25 ASSESSMENT — PAIN DESCRIPTION - DESCRIPTORS
DESCRIPTORS: ACHING;CRAMPING
DESCRIPTORS: ACHING

## 2022-10-25 ASSESSMENT — PAIN DESCRIPTION - PAIN TYPE
TYPE: ACUTE PAIN
TYPE: ACUTE PAIN

## 2022-10-25 ASSESSMENT — PAIN SCALES - GENERAL
PAINLEVEL_OUTOF10: 7
PAINLEVEL_OUTOF10: 7
PAINLEVEL_OUTOF10: 0
PAINLEVEL_OUTOF10: 0
PAINLEVEL_OUTOF10: 7
PAINLEVEL_OUTOF10: 3
PAINLEVEL_OUTOF10: 7

## 2022-10-25 ASSESSMENT — PAIN DESCRIPTION - LOCATION
LOCATION: ABDOMEN

## 2022-10-25 ASSESSMENT — PAIN DESCRIPTION - ORIENTATION
ORIENTATION: MID

## 2022-10-25 ASSESSMENT — PAIN DESCRIPTION - ONSET: ONSET: ON-GOING

## 2022-10-25 ASSESSMENT — PAIN DESCRIPTION - FREQUENCY: FREQUENCY: CONTINUOUS

## 2022-10-25 NOTE — PROGRESS NOTES
Comprehensive Nutrition Assessment    Type and Reason for Visit: Reassess  TPN Management (Oncology) and Poor Intake/Appetite 5 or More Days (Hospitalists)    Nutrition Recommendations/Plan:   Meals and Snacks:  Diet: Continue current order GI Soft for pleasure   Nutrition Supplement Therapy:  Medical food supplement therapy:  Continue Ensure Clear three times per day (this provides 240 kcal and 8 grams protein per bottle)  Parenteral Nutrition:  Central parenteral nutrition  begins at 1800 today  Continue: Dex 10% , 4.25% AA 2 L (85ml/hr)   Resume 250 ml 20% lipids daily  To provide: 1520 kcal/d (100% of needs), 85 grams of protein/d (100% of needs), 200 grams of CHO/d and ~2000 ml of total volume/d. Lytes/L: no changes warranted to lytes today  Sodium ( 85 meq NaCl and 65 meq NaAcetate), Potassium ( 15 meq KCl) Phosphorus ( 12.5 mmol KPO4), Calcium (4.5 meq), Magnesium 10 meq   Solution formulated to be 1:1 Cl: Acetate ratio. Other additives:   MVI Monday Wednesday Friday due to national shortages, MTE  Nutrition Related Medication Management: Thiamine  mg x 7 days (day 4 of 7 days)  Labs:   CMP daily per MD  Mg daily per MD  Phos MWF    Triglyceride tomorrow  POC Glucoses/SSI Not indicated     Malnutrition Assessment:  Malnutrition Status: Severe malnutrition  Context: Chronic Illness  Findings of clinical characteristics of malnutrition:   Energy Intake:  Mild decrease in energy intake (Comment) (unable to tolerate even water for unknown time frame)  Weight Loss:  Greater than 20% over 1 year (42# (30.9%))     Body Fat Loss:  Severe body fat loss Triceps, Fat Overlying Ribs   Muscle Mass Loss:  Severe muscle mass loss Temples (temporalis), Clavicles (pectoralis & deltoids), Scapula (trapezius)  Fluid Accumulation:  No significant fluid accumulation     Strength:  Not Performed     Nutrition Assessment:  Nutrition History: Patient known to RD from recent admission.  She was eating normally prior to recent admission. Usual intake was 3 meals per day. She has historically not tolerated Ensure/Boost products. PO intake last admission was 1-25% of meals but did drink Ensure Clear well. Today she states that she has been having difficulty swallowing and everything she tried to eat or drink has been coming back up. She states that is progressively got worse until she was unable to tolerate even water. 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 She presented with abdominal pain, difficulty swallowing \"stuck in throat and vomits. \" She was supposed to have outpt EGD but presented to ER with intractable nausea and vomiting. She is now s/p EGD 10/21 with findings of LA grade D esophagitis, proximal esophageal stricture, Sckatzki's ring. Nutrition Interval:  Right single lumen port in place. Dilute TPN initiated 10/222 with lipids. Per GI op note from EGD 10/22 \"Full liquids today; advance tomorrow as tolerated. \" TPN to goal 10/23. Diet liberalized to GI Soft, primarily for pleasure. Pt seen working with PT, walking with TPN infusing. Discussed pt with Jesse Garner RN. Pt continues to tolerate TPN, awaiting having BM. Potential discharge w/ TPN today or tomorrow.      Abdominal Status (last documented):   Last BM (including prior to admit): 10/20/22, GI Symptoms: Distention, Nausea, Reduction in appetite   Pertinent Medications: tums, Rocephin, Reglan, Zofran PRN (utilizing daily), Protonix, difulcan, folvite, glycolax, senna  Electrolyte replacements: 2 gm Mag 10/22  Pertinent Labs:   Lab Results   Component Value Date/Time     10/25/2022 04:00 AM    K 4.2 10/25/2022 04:00 AM     10/25/2022 04:00 AM    CO2 30 10/25/2022 04:00 AM    BUN 12 10/25/2022 04:00 AM    CREATININE 0.30 10/25/2022 04:00 AM    GLUCOSE 79 10/25/2022 04:00 AM    CALCIUM 8.4 10/25/2022 04:00 AM    PHOS 3.3 10/25/2022 04:00 AM    MG 2.0 10/25/2022 04:00 AM     Lab Results   Component Value Date/Time    TRIG 148 10/25/2022 04:00 AM       Remarkable for K, Mg and Phos WNL. Na remains WNL. Triglyceride lab now corrected and WNL to resume daily lipid infusion. Likely contamination 10/24. Current Nutrition Therapies:  ADULT ORAL NUTRITION SUPPLEMENT; Breakfast, Lunch, Dinner; Clear Liquid Oral Supplement  ADULT ORAL NUTRITION SUPPLEMENT; Lunch, Dinner; Low Calorie/High Protein Oral Supplement  ADULT DIET; Regular; GI [place] (GERD/Peptic Ulcer)  PN-Adult Premix 4.25/10 - Central Line  Current Parenteral Nutrition Orders:  Type and Formula: Premix Central (DEX 10%, AA 4.25%)   Lipids:  (held overnight)  Duration: Continuous  Rate/Volume: 2L (85 ml/hr)  Current PN Order Provides: @ goal  Goal PN Orders Provides: 1120 kcal/d (75% of needs), 85 grams of protein/d (100% of needs), 200 grams of CHO/d and ~2250 ml of total volume/d.       Current Intake:   Average Meal Intake:  (bites and sips) Average Supplements Intake: 0%      Anthropometric Measures:  Height: 5' 4\" (162.6 cm)  Current Body Wt: 106 lb 7.7 oz (48.3 kg) (10/24), Weight source: Standing Scale  BMI: 18.3, Underweight (BMI less than 18.5)  Admission Body Weight: 92 lb (41.7 kg) (10/20 office wt)  Ideal Body Weight (Kg) (Calculated): 55 kg (120 lbs), 88.7 %  Usual Body Wt: 136 lb (61.7 kg) (10/19/21 office visit), Percent weight change: -30.9       Edema:    BMI Category Underweight (BMI less than 18.5)    Estimated Daily Nutrient Needs:  Energy (kcal/day): 9257-4405 (Kcal/kg (35-40) Weight used: 42.6 kg Current  Protein (g/day): 64-85 (1.2-5 g/kg) Weight Used: (Current) 42.6 kg  Fluid (ml/day):   (1 ml/kcal)    Nutrition Diagnosis:   Inadequate oral intake related to swallowing difficulty as evidenced by  (reported barrier to PO, intake as above, erosive esophagitis)    Severe malnutrition related to inadequate protein-energy intake as evidenced by Criteria as identified in malnutrition assessment    Nutrition Interventions:   Food and/or Nutrient Delivery: Continue Current Diet, Continue Oral Nutrition Supplement, Modify Parenteral Nutrition     Coordination of Nutrition Care: Continue to monitor while inpatient  Plan of Care discussed with: Terry Sam RN    Goals:   Previous Goal Met: Goal(s) Achieved  Active Goal:  (Continune to maintain TPN at goal rate for primary nutrition)       Nutrition Monitoring and Evaluation:      Food/Nutrient Intake Outcomes: Food and Nutrient Intake, Supplement Intake, Parenteral Nutrition Intake/Tolerance  Physical Signs/Symptoms Outcomes: Biochemical Data, GI Status, Fluid Status or Edema, Weight, Meal Time Behavior    Discharge Planning:    Parenteral Nutrition    Wander Lopez, [facility]

## 2022-10-25 NOTE — PROGRESS NOTES
[facility] Hematology & Oncology        Inpatient Hematology / Oncology Progress Note    Reason for Admission:  Chronic abdominal pain [R10.9, G89.29]  Metastatic carcinoma (HCC) [C79.9]  Intractable nausea and vomiting [R11.2]  Dysphagia, unspecified type [R13.10]    24 Hour Events:  Afebrile, VSS  Ongoing constipation since last Thursday  Drinking miralax this am  Give laculose this afternoon if no BM      ROS:  Constitutional: Negative for fever, chills. CV: Negative for chest pain, palpitations, edema. Respiratory: Negative for dyspnea, cough, wheezing. GI: Negative for abdominal pain, diarrhea. +constipation      10 point review of systems is otherwise negative with the exception of the elements mentioned above in the HPI.        No Known Allergies  Past Medical History:   Diagnosis Date    Anemia     -- not a problem since hyst    Colon cancer ([facility] dx 2022     plans for chemo--- followed by dr Gricelda EL-19 2020    no hospitalization    GERD (gastroesophageal reflux disease)     managed with med    History of colonoscopy 2018    Dr. Aleks Jarquin, nl (see media note), R     Hx of blood clots 2022    per pt \"small clot on lung identified by CT scan\"  CT Scan impression:- Nonobstructive pulmonary filling defect involving Left Lower Lobe     Hypotension     asymptomatic    Obstruction of fallopian tube     per pt has \"1 good tube\"    Peritoneal carcinomatosis ([facility]     Weight loss     80lbs weight loss after gastric sleeve     Past Surgical History:   Procedure Laterality Date     SECTION      CYSTOSCOPY Bilateral 2022    CYSTOSCOPY BILATERAL RETROGRADE PYELOGRAM performed by Sophie Ricks MD at [address] Bilateral 2022    BILATERAL CYSTOSCOPY URETERAL STENT EXCHANGE performed by Sophie Ricks MD at [address] Bilateral 10/6/2022    CYSTOSCOPY BILATERAL URETERAL STENT REMOVAL, RIGHT URETERAL [address],[facility] performed by Serina Bloch Ally Rivera MD at [facility] MAIN OR    GASTRIC BYPASS SURGERY  07/23/2014    gastric sleeve- Choudhari    HYSTERECTOMY (CERVIX STATUS UNKNOWN)      2018    HYSTERECTOMY (CERVIX STATUS UNKNOWN)  07/09/2020    [facility] w/ Bilateral salpingectomy and left oophorectomy    IR PERC CATH PLEURAL DRAIN W/IMAG  9/26/2022    IR PERC CATH PLEURAL DRAIN W/IMAG 9/26/2022 [facility]    IR PORT PLACEMENT EQUAL OR GREATER THAN 5 YEARS  2/28/2022    IR PORT PLACEMENT EQUAL OR GREATER THAN 5 YEARS  2/28/2022    IR PORT PLACEMENT EQUAL OR GREATER THAN 5 YEARS 2/28/2022 [facility] SPECI[facility]    LAPAROTOMY N/A 8/17/2022    EXPLORATORY LAPAROTOMY/ ENTEROENTEROSTOMY performed by Eddie Leblanc MD at [facility]. [address]  age Elbridge Splinter 21s\"    also \"unblocked her FT\"    TONSILLECTOMY      UPPER GASTROINTESTINAL ENDOSCOPY      with dilation    UPPER GASTROINTESTINAL ENDOSCOPY N/A 9/13/2022    EGD ESOPHAGOGASTRODUODENOSCOPY OFL 17 To IR after recovery for Para performed by Krystal Alexis MD at [facility] ENDOSCOPY    UPPER GASTROINTESTINAL ENDOSCOPY N/A 10/21/2022    EGD DILATION BALLOON performed by Ada Franco MD at [facility] ENDOSCOPY    UROLOGICAL SURGERY Bilateral 03/15/2022    cysto     Family History   Problem Relation Age of Onset    Heart Disease Maternal Grandmother     Hypertension Maternal Grandmother     Heart Disease Maternal Grandfather     Hypertension Maternal Grandfather     Pulmonary Embolism Neg Hx     Colon Cancer Neg Hx     Deep Vein Thrombosis Neg Hx     Ovarian Cancer Neg Hx     Prostate Cancer Neg Hx     Breast Cancer Neg Hx      Social History     Socioeconomic History    Marital status: Single     Spouse name: Not on file    Number of children: Not on file    Years of education: Not on file    Highest education level: Not on file   Occupational History    Not on file   Tobacco Use    Smoking status: Never    Smokeless tobacco: Never   Vaping Use    Vaping Use: Never used   Substance and Sexual Activity    Alcohol use: Not Currently    Drug use: No    Sexual activity: Not on file   Other Topics Concern    Not on file   Social History Narrative    1. Use large speculum. 2.  sister, Aditya January #[id number] and mom is Dion Singh #[id number] (both Kofoed's pts)  3.   PCP  Dr. James Handy ([facility]), GI Dr. Loyda Bello-2018 normal EGD     Social Determinants of Health     Financial Resource Strain: Not on file   Food Insecurity: Not on file   Transportation Needs: Not on file   Physical Activity: Not on file   Stress: Not on file   Social Connections: Not on file   Intimate Partner Violence: Not on file   Housing Stability: Not on file     Current Facility-Administered Medications   Medication Dose Route Frequency Provider Last Rate Last Admin    sennosides-docusate sodium (SENOKOT-S) 8.6-50 MG tablet 2 tablet  2 tablet Oral Daily Russ River APRN - NP   2 tablet at 10/25/22 0920    polyethylene glycol (GLYCOLAX) packet 17 g  17 g Oral Daily Russ River APRN - NP   17 g at 10/24/22 1112    PN-Adult Premix 4.25/10 - [place]   IntraVENous Continuous TPN Arleth Moreau MD 85 mL/hr at 10/24/22 [place] at 10/24/22 1835    calcium carbonate (TUMS) chewable tablet 500 mg  500 mg Oral TID PRN Arleth Moreau MD   500 mg at 10/25/22 0527    fluconazole (DIFLUCAN) tablet 200 mg  200 mg Oral Daily Scheryl Messenger, APRN - CNP   200 mg at 10/25/22 0920    0.9 % sodium chloride infusion   IntraVENous PRN Arleth Moreau MD        [facility] [place] by provider] fat emulsion 20 % infusion 250 mL  250 mL IntraVENous Daily Arleth Moreau MD   Stopped at 10/24/22 0625    HYDROcodone-acetaminophen (NORCO) 5-325 MG per tablet 1 tablet  1 tablet Oral Q6H PRN Scheryl Messenger, APRN - CNP        pantoprazole (PROTONIX) 40 mg in sodium chloride (PF) 10 mL injection  40 mg IntraVENous BID Climmie Baylee, APRN - NP   40 mg at 10/25/22 0929    sodium chloride flush 0.9 % injection 5-40 mL  5-40 mL IntraVENous 2 times per day Berry Fatima, DO   10 mL at 10/25/22 8567    sodium chloride flush 0.9 % injection 5-40 mL  5-40 mL IntraVENous PRN Jazz Pramod, DO        0.9 % sodium chloride infusion   IntraVENous PRN Jazz Pramod, DO        ondansetron (ZOFRAN-ODT) disintegrating tablet 4 mg  4 mg Oral Q8H PRN Jazz Pramod, DO        Or    ondansetron [facility]) injection 4 mg  4 mg IntraVENous Q6H PRN Jazz Pramod, DO   4 mg at 10/25/22 3059    acetaminophen (TYLENOL) tablet 650 mg  650 mg Oral Q6H PRN Jazz Pramod, DO        Or    acetaminophen (TYLENOL) suppository 650 mg  650 mg Rectal Q6H PRN Jazz Pramod, DO        hyoscyamine (LEVSIN/SL) sublingual tablet 125 mcg  125 mcg SubLINGual Q4H PRN Jazz Pramod, DO        glucose chewable tablet 16 g  4 tablet Oral PRN Jazz Pramod, DO        dextrose bolus 10% 125 mL  125 mL IntraVENous PRN Jazz Pramod, DO        Or    dextrose bolus 10% 250 mL  250 mL IntraVENous PRN Jazz Pramod, DO        glucagon (rDNA) injection 1 mg  1 mg SubCUTAneous PRN Jazz Pramod, DO        dextrose 10 % infusion   IntraVENous Continuous PRN Jazz Pramod, DO        HYDROmorphone HCl PF (DILAUDID) injection 0.5 mg  0.5 mg IntraVENous Q4H PRN Jazz Pramod, DO   0.5 mg at 10/25/22 9114    metoclopramide (REGLAN) injection 5 mg  5 mg IntraVENous q8h Jazz Pramod, DO   5 mg at 10/25/22 0510    [Held by provider] apixaban (ELIQUIS) tablet 5 mg  5 mg Oral BID Jazz Pramod, DO   5 mg at 10/22/22 2036       OBJECTIVE:  Patient Vitals for the past 8 hrs:   BP Temp Temp src Pulse Resp SpO2   10/25/22 0928 -- -- -- -- 20 --   10/25/22 0800 126/89 98.4 °F (36.9 °C) Oral 72 16 99 %   10/25/22 0540 -- -- -- -- 16 --   10/25/22 0510 -- -- -- -- 16 --   10/25/22 0229 124/86 98 °F (36.7 °C) Oral 72 16 96 %     Temp (24hrs), Av.1 °F (36.7 °C), Min:97.2 °F (36.2 °C), Max:99 °F (37.2 °C)    10/25 07 - 10/25 1900  In: 10 [I.V.:10]  Out: 350 [Urine:350]    Physical Exam:  Constitutional: Chronically ill-appearing female in no acute distress, sitting comfortably in the hospital bedside chair   HEENT: Normocephalic and atraumatic. Oropharynx is clear, mucous membranes are moist.  Extraocular muscles are intact. Sclerae anicteric. Skin Warm and dry. No bruising and no rash noted. No erythema. No pallor. Respiratory Lungs are clear to auscultation bilaterally without wheezes, rales or rhonchi, normal air exchange without accessory muscle use. CVS Normal rate, regular rhythm and normal S1 and S2. No murmurs, gallops, or rubs. Abdomen Soft, nontender and nondistended, normoactive bowel sounds. No palpable mass. No hepatosplenomegaly. Neuro Grossly nonfocal with no obvious sensory or motor deficits. MSK Normal range of motion in general.  No edema and no tenderness. Psych Appropriate mood and affect.         Labs:    Recent Results (from the past 24 hour(s))   Basic Metabolic Panel    Collection Time: 10/24/22 10:19 AM   Result Value Ref Range    Sodium 136 133 - 143 mmol/L    Potassium 4.1 3.5 - 5.1 mmol/L    Chloride 107 101 - 110 mmol/L    CO2 27 21 - 32 mmol/L    Anion Gap 2 2 - 11 mmol/L    Glucose 110 (H) 65 - 100 mg/dL    BUN 13 6 - 23 MG/DL    Creatinine 0.40 (L) 0.6 - 1.0 MG/DL    Est, Glom Filt Rate >60 >60 ml/min/1.73m2    Calcium 8.1 (L) 8.3 - 10.4 MG/DL   Magnesium    Collection Time: 10/24/22 10:19 AM   Result Value Ref Range    Magnesium 1.7 (L) 1.8 - 2.4 mg/dL   Phosphorus    Collection Time: 10/24/22 10:19 AM   Result Value Ref Range    Phosphorus 3.2 2.5 - 4.5 MG/DL   CBC with Auto Differential    Collection Time: 10/25/22  4:00 AM   Result Value Ref Range    WBC 4.1 (L) 4.3 - 11.1 K/uL    RBC 3.24 (L) 4.05 - 5.2 M/uL    Hemoglobin 8.8 (L) 11.7 - 15.4 g/dL    Hematocrit 28.5 (L) 35.8 - 46.3 %    MCV 88.0 82 - 102 FL    MCH 27.2 26.1 - 32.9 PG    MCHC 30.9 (L) 31.4 - 35.0 g/dL    RDW 19.0 (H) 11.9 - 14.6 %    Platelets 545 528 - 517 K/uL    MPV 10.7 9.4 - 12.3 FL    nRBC 0.00 0.0 - 0.2 K/uL    Differential Type AUTOMATED Seg Neutrophils 61 43 - 78 %    Lymphocytes 30 13 - 44 %    Monocytes 5 4.0 - 12.0 %    Eosinophils % 3 0.5 - 7.8 %    Basophils 0 0.0 - 2.0 %    Immature Granulocytes 1 0.0 - 5.0 %    Segs Absolute 2.5 1.7 - 8.2 K/UL    Absolute Lymph # 1.2 0.5 - 4.6 K/UL    Absolute Mono # 0.2 0.1 - 1.3 K/UL    Absolute Eos # 0.1 0.0 - 0.8 K/UL    Basophils Absolute 0.0 0.0 - 0.2 K/UL    Absolute Immature Granulocyte 0.0 0.0 - 0.5 K/UL   Magnesium    Collection Time: 10/25/22  4:00 AM   Result Value Ref Range    Magnesium 2.0 1.8 - 2.4 mg/dL   Comprehensive Metabolic Panel    Collection Time: 10/25/22  4:00 AM   Result Value Ref Range    Sodium 138 133 - 143 mmol/L    Potassium 4.2 3.5 - 5.1 mmol/L    Chloride 104 101 - 110 mmol/L    CO2 30 21 - 32 mmol/L    Anion Gap 4 2 - 11 mmol/L    Glucose 79 65 - 100 mg/dL    BUN 12 6 - 23 MG/DL    Creatinine 0.30 (L) 0.6 - 1.0 MG/DL    Est, Glom Filt Rate >60 >60 ml/min/1.73m2    Calcium 8.4 8.3 - 10.4 MG/DL    Total Bilirubin 0.3 0.2 - 1.1 MG/DL    ALT 9 (L) 12 - 65 U/L    AST 12 (L) 15 - 37 U/L    Alk Phosphatase 63 50 - 136 U/L    Total Protein 5.4 (L) 6.3 - 8.2 g/dL    Albumin 2.4 (L) 3.5 - 5.0 g/dL    Globulin 3.0 2.8 - 4.5 g/dL    Albumin/Globulin Ratio 0.8 0.4 - 1.6     Phosphorus    Collection Time: 10/25/22  4:00 AM   Result Value Ref Range    Phosphorus 3.3 2.5 - 4.5 MG/DL   Ferritin    Collection Time: 10/25/22  4:00 AM   Result Value Ref Range    Ferritin 109 8 - 388 NG/ML   Transferrin Saturation    Collection Time: 10/25/22  4:00 AM   Result Value Ref Range    Iron 48 35 - 150 ug/dL    TIBC 131 (L) 250 - 450 ug/dL    TRANSFERRIN SATURATION 37 >20 %   Vitamin B12    Collection Time: 10/25/22  4:00 AM   Result Value Ref Range    Vitamin B-12 520 193 - 986 pg/mL   Folate    Collection Time: 10/25/22  4:00 AM   Result Value Ref Range    Folate 5.5 3.1 - 17.5 ng/mL       Imaging:  Xray Result (most recent):  XR CHEST LIMITED ONE VIEW 10/20/2022    Narrative  Chest X-ray    INDICATION: Chest pain    AP/PA view of the chest was obtained. FINDINGS: The lungs are clear. There are no infiltrates or effusions. The heart  size is normal.  Vascular port is present. Impression  No acute findings in the chest    CT Result (most recent):  CT ABDOMEN PELVIS W IV CONTRAST 10/20/2022    [facility]  CT OF THE ABDOMEN AND PELVIS    INDICATION: Abdominal pain and nausea, history of peritoneal carcinomatosis. Multiple axial images were obtained through the abdomen and pelvis. Oral  contrast was used for bowel opacification. 100mL of Isovue 370 intravenous  contrast was used for better evaluation of solid organs and vascular structures. Radiation dose reduction techniques were used for this study. All CT scans  performed at this facility use one or all of the following: Automated exposure  control, adjustment of the mA and/or kVp according to patient's size, iterative  reconstruction. COMPARISON: 10/03/2022    FINDINGS:  - LUNG BASES: Small left pleural effusion. Right lung base is clear.    - LIVER: Normal in size and appearance. - GALLBLADDER/BILE DUCTS: Stable mild bile duct dilatation.  - PANCREAS: Normal.  - SPLEEN: Small, mottled enhancement    - ADRENALS: Normal.  - KIDNEYS/URETERS: Right ureteral stent is in place. Decreased right  hydronephrosis. Left ureteral stent has been removed. Only mild residual  prominence of the left collecting system. - BLADDER: Distal end of the right ureteral stent is in the bladder. While  diffuse bladder wall thickening.  - REPRODUCTIVE ORGANS: Post hysterectomy.    - BOWEL: Mild diffuse bowel wall thickening, both small bowel and colon. Esophagus is distended. No bowel distention.  - LYMPH NODES: No significant retroperitoneal, mesenteric, or pelvic adenopathy.  - BONES: No fracture or significant bone lesion.  - VASCULATURE: Normal  - OTHER: Peritoneal nodularity is again noted. There is increased ascites.   Percutaneous strain is present in the right abdomen. Impression  1. Peritoneal carcinomatosis and increased ascites. 2.  Persistent diffuse bowel wall thickening. 3.  Persistent but improved bilateral hydronephrosis. 4.  Diffuse bladder wall thickening. 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 ([address]    Bilateral hydronephrosis    Peritoneal carcinoma ([address]    Peritoneal carcinomatosis ([address]    Hematemesis    Current use of long term anticoagulation    Intractable vomiting    Generalized abdominal pain    LFT elevation    Obstruction of both ureters    Abdominal pain    Hypokalemia    Hypomagnesemia    Hypoalbuminemia    GERD (gastroesophageal reflux disease)    Colitis    Intractable vomiting with nausea    Fever    CINV (chemotherapy-induced nausea and vomiting)    Intractable nausea and vomiting    Severe protein-calorie malnutrition (HCC)    Dysphagia    Metastatic carcinoma (HCC)    Chronic abdominal pain     Ms. Mercedes Jj is a 52 y.o. female admitted on 10/20/2022. The primary encounter diagnosis was Dysphagia, unspecified type. Diagnoses of Chronic abdominal pain and Metastatic carcinoma ([address] were also pertinent to this visit. Estella Balling Her PMH includes sleeve gastrectomy, hydronephrosis s/p ureteral stents with recent R stent exchange with unsuccessful L exchange d/t tumor, recent GIB with neg EGD, recurrent ascites s/p pleurx drain 9/26, DVT on Eliquis, and hx esophageal strictures.  She is a patient of Dr Meenakshi Panda with metastatic carcinoma of unknown pimary with known peritoneal carcinomatosis on diagnosis. She completed C9 FOLFOX/Avastin on 9/8/22. Treatment recently changed d/t POD. She is s/p C1D8 paclitaxel/Cyramza on 10/20. She has had multiple recent admissions for intractable nausea and vomiting, most recently from 10/3 - 10/7/22 for colitis vs serosal implants of tumor. She was given CTX/Flagyl and then changed to LVQ/Flagyl. She was discharged home on Flagyl/Cipro. She returned to ED on 10/20 with c/o nausea, vomiting, and abd pain. She reports difficulty swallowing foods and liquids. Feels like food gets stuck for the past 3 days, causing her to vomit any time she eats. CT AP with peritoneal carcinomatosis and increased ascites; persistent diffuse bowel wall thickening; persistent but improved b/l hydronephrosis; and diffuse bladder wall thickening. CXR neg. GI following, plans for EGD with possible dilation today. We were asked for recommendations for our patient. PLAN:  Metastatic carcinoma of unknown primary / peritoneal carcinomatosis  - s/p FOLFOX/Avastin with POD  - s/p C1D8 paclitaxel/Cyramza on 10/20    Nausea / Vomiting / Dysphagia  - CT AP with peritoneal carcinomatosis and increased ascites; persistent diffuse bowel wall thickening; persistent but improved b/l hydronephrosis; and diffuse bladder wall thickening  - GI following, EGD with possible dilation today  - If no improvement after EGD, will consider TPN  - RD consulted  - Check UA   10/22 s/p EGD yesterday with erosive esophagitis, proximal esophageal stricture, and Schatzki ring. On PPI BID. Start TPN. 10/23 Continues on TPN. 10/24 patient is not a candidate for feeding tube due to history of gastric bypass and due to peritoneal carcinomatosis    UTI / Hematuria  - UA c/w UTI  - Ucx ordered  - Start CTX  10/22 Ucx pending. Con't CTX (D2).  10/23 Ucx +yeast, I/S pending. Start Diflucan. Completes CTX today (D3). Gross hematuria noted.   Hold Eliquis    Hx DVT  - on Eliquis  10/23 Hold Eliquis d/t hematuria    Vitamin D Def  10/25 replacement ordered    Constipation  10/25 no BM since last Thursday. Drinking miralax had pericolace this am. If no bm this afternoon give lactulose    Continue home meds  Andres SOPs  AC contraindicated d/t hematuria    Goals and plan of care reviewed with the patient. All questions answered to the best of our ability.           SCOTT Pablo - PORSHA   [facility] Hematology & Oncology  [address] [place] [address]  Office : [phone]  Fax : [phone]

## 2022-10-25 NOTE — PROGRESS NOTES
ACUTE OCCUPATIONAL THERAPY GOALS:   (Developed with and agreed upon by patient and/or caregiver.)  1. Patient will complete lower body bathing and dressing with MINIMAL ASSIST and adaptive equipment as needed. 2. Patient will complete toileting with INDEPENDENCE . 3. Patient will tolerate 25 minutes of OT treatment with 1-2 rest breaks to increase activity tolerance for ADLs. 4. Patient will complete functional transfers with MODIFIED INDEPENDENCE  and adaptive equipment as needed. 5. Patient will complete functional ADL task standing at sink with MODIFIED INDEPENDENCE  and adaptive equipment as needed. Timeframe: 7 visits       OCCUPATIONAL THERAPY: Daily Note AM   OT Visit Days: 2   Time  OT Charge Capture  Rehab Caseload Tracker  OT Orders    Elex Holstein is a 52 y.o. female   PRIMARY DIAGNOSIS: Intractable nausea and vomiting  Chronic abdominal pain [R10.9, G89.29]  Metastatic carcinoma (HCC) [C79.9]  Intractable nausea and vomiting [R11.2]  Dysphagia, unspecified type [R13.10]  Procedure(s) (LRB):  EGD DILATION BALLOON (N/A)  4 Days Post-Op  Inpatient: Payor: Kalli Berrios 150 / Plan: Etienne Reynoso / Product Type: *No Product type* /     ASSESSMENT:     REHAB RECOMMENDATIONS: CURRENT LEVEL OF FUNCTION:  (Most Recently Demonstrated)   Recommendation to date pending progress:  Setting:  Home Health Therapy    Equipment:    To Be Determined Bathing:  Not Tested  Dressing:  Not Tested  Feeding/Grooming:  Supervision/Setup  Toileting:  Not Tested  Functional Mobility:  Stand by Assist     ASSESSMENT:  Ms. Jessenia Villavicencio is a 51 y/o female who presented this admission for abdominal pain and vomiting. She has metastatic carcinoma and is currently undergoing chemo. At baseline pt is independent with most ADLs, however has lately been receiving assistance with bathing. She also has began using a cane with ambulating. Pt seen as co-treatment this date due to limited participations in therapies.  This date, SBA for functional mobility while pushing IV pole. Pt took pain medications prior to treatment so was more closely monitored this date for balance. Completed grooming tasks with set up while seated in chair. Pt continues to present with decreased activity tolerance, balance, and strength impacting ADLs. Pt is currently functioning below baseline and would benefit from skilled OT services to address OT goals and plan of care. Rec HHOT at d/c.   .        SUBJECTIVE:     Ms. Amarilys Montero states, \"I'll get up and walk\"     Social/Functional Lives With: Family  Type of Home: House  Home Layout: One level  Home Access: Level entry  Bathroom Shower/Tub: Shower chair with back  Home Equipment: Admittor Help From: Family  ADL Assistance: Needs assistance  Bath: Moderate assistance  Dressing: Independent  Grooming: Independent  Feeding: Independent  Homemaking Assistance: Independent  Ambulation Assistance: Independent  Transfer Assistance: Independent  Active : Yes  Mode of Transportation: Car  Occupation: Works at home  Type of Occupation: IVC    OBJECTIVE:     Iona Salvador / Scottie Davila / Laquita Waldennet: IV    RESTRICTIONS/PRECAUTIONS:           PAIN: Leim Yan / O2:   Pre Treatment:    6/10        Post Treatment: 6/10 Vitals          Oxygen        MOBILITY: I Mod I S SBA CGA Min Mod Max Total  NT x2 Comments:   Bed Mobility    Rolling [] [] [] [] [] [] [] [] [] [] []    Supine to Sit [x] [] [] [] [] [] [] [] [] [] []    Scooting [x] [] [] [] [] [] [] [] [] [] []    Sit to Supine [] [] [] [] [] [] [] [] [] [] []    Transfers    Sit to Stand [] [] [x] [] [] [] [] [] [] [] []    Bed to Chair [] [] [] [] [] [] [] [] [] [] []    Stand to Sit [] [] [x] [] [] [] [] [] [] [] []    Tub/Shower [] [] [] [] [] [] [] [] [] [] []     Toilet [] [] [] [] [] [] [] [] [] [] []      [] [] [] [] [] [] [] [] [] [] []    I=Independent, Mod I=Modified Independent, S=Supervision/Setup, SBA=Standby Assistance, CGA=Contact Guard Assistance, Min=Minimal Assistance, Mod=Moderate Assistance, Max=Maximal Assistance, Total=Total Assistance, NT=Not Tested    ACTIVITIES OF DAILY LIVING: I Mod I S SBA CGA Min Mod Max Total NT Comments   BASIC ADLs:              Upper Body   Bathing [] [] [] [] [] [] [] [] [] []    Lower Body Bathing [] [] [] [] [] [] [] [] [] []    Toileting [] [] [] [] [] [] [] [] [] []    Upper Body Dressing [] [] [] [] [] [] [] [] [] []    Lower Body Dressing [] [] [] [] [] [] [] [] [] []    Feeding [] [] [] [] [] [] [] [] [] []    Grooming [] [] [x] [] [] [] [] [] [] []    Personal Device Care [] [] [] [] [] [] [] [] [] []    Functional Mobility [] [] [x] [x] [] [] [] [] [] [] Pushing IV pole, lap around the aggarwal   I=Independent, Mod I=Modified Independent, S=Supervision/Setup, SBA=Standby Assistance, CGA=Contact Guard Assistance, Min=Minimal Assistance, Mod=Moderate Assistance, Max=Maximal Assistance, Total=Total Assistance, NT=Not Tested    BALANCE: Good Fair+ Fair Fair- Poor NT Comments   Sitting Static [x] [] [] [] [] []    Sitting Dynamic [x] [] [] [] [] []              Standing Static [] [x] [] [] [] []    Standing Dynamic [] [x] [] [] [] []        PLAN:     FREQUENCY/DURATION   OT Plan of Care: 3 times/week for duration of hospital stay or until stated goals are met, whichever comes first.    TREATMENT:     TREATMENT:   Co-Treatment PT/OT necessary due to patient's decreased overall endurance/tolerance levels, as well as need for high level skilled assistance to complete functional transfers/mobility and functional tasks  Self Care (20 minutes): Patient participated in grooming ADLs in supported sitting with minimal verbal cueing to increase independence. Patient also participated in functional mobility and functional transfer training to increase independence, decrease assistance required, increase activity tolerance, and increase safety awareness.      TREATMENT GRID:  N/A    AFTER TREATMENT PRECAUTIONS: Bed/Chair Locked, Call light within reach, Chair, Needs within reach, and RN notified    INTERDISCIPLINARY COLLABORATION:  RN/ PCT and PT/ PTA    EDUCATION:       TOTAL TREATMENT DURATION AND TIME:  Time In: 1055  Time Out: [address]  Minutes: [address], [place]

## 2022-10-25 NOTE — PROGRESS NOTES
ACUTE PHYSICAL THERAPY GOALS:   (Developed with and agreed upon by patient and/or caregiver.)  (1.)Ms. Earle Becerra will tolerate a minimum of 25 minutes of therapeutic activity/exercise in order to demonstrate improved activity tolerance within 7 treatment days. (2.)Ms. Earle Becerra will transfer from bed to chair and chair to bed with INDEPENDENCE using the least restrictive device within 5 treatment day(s). (3.)Ms. Earle Becerra will ambulate with INDEPENDENCE for a minimum of 500 feet with the least restrictive device within 5 treatment day(s). PHYSICAL THERAPY: Daily Note AM   (Link to Caseload Tracking: PT Visit Days : 2  Time In/Out PT Charge Capture  Rehab Caseload Tracker  Orders    Wilner Goetz is a 52 y.o. female   PRIMARY DIAGNOSIS: Intractable nausea and vomiting  Chronic abdominal pain [R10.9, G89.29]  Metastatic carcinoma (HCC) [C79.9]  Intractable nausea and vomiting [R11.2]  Dysphagia, unspecified type [R13.10]  Procedure(s) (LRB):  EGD DILATION BALLOON (N/A)  4 Days Post-Op  Inpatient: Payor: Kalli Berrios 150 / Plan: Ulises Lacrosse / Product Type: *No Product type* /     ASSESSMENT:     REHAB RECOMMENDATIONS:   Recommendation to date pending progress:  Setting:  Home Health Therapy    Equipment:    None     ASSESSMENT:  Ms. Earle Becerra is doing better today she was more agreeable to participate. OT cotx due to her limiting participation yesterday. She is able to ambulate a lap in the aggarwal with SBA pushing the IV pole. PT was hovering a little since she recently had pain medicine. She tolerated it well. Encouraged her to be up in the chair as much as she could today. .     SUBJECTIVE:   Ms. Earle Becerra states, \"so you want me up most of the day? \"     Social/Functional Lives With: Family  Type of Home: House  Home Layout: One level  Home Access: Level entry  Bathroom Shower/Tub: Shower chair with back  Home Equipment: León Spivey Help From: Family  ADL Assistance: Needs assistance  Bath: Moderate assistance  Dressing: Independent  Grooming: Independent  Feeding: Independent  Homemaking Assistance: Independent  Ambulation Assistance: Independent  Transfer Assistance: Independent  Active : Yes  Mode of Transportation: Car  Occupation: Works at home  Type of Occupation: IVC  OBJECTIVE:     PAIN: VITALS / O2: PRECAUTION / Samson Hernandez / Lynda Butt:   Pre Treatment: 6  she had been medicated with dilaudid and ativan         Post Treatment: 6 Vitals        Oxygen    IV    RESTRICTIONS/PRECAUTIONS:        MOBILITY: I Mod I S SBA CGA Min Mod Max Total  NT x2 Comments:   Bed Mobility    Rolling [x] [] [] [] [] [] [] [] [] [] []    Supine to Sit [x] [] [] [] [] [] [] [] [] [] []    Scooting [x] [] [] [] [] [] [] [] [] [] []    Sit to Supine [] [] [] [] [] [] [] [] [] [x] []    Transfers    Sit to Stand [] [] [x] [] [] [] [] [] [] [] []    Bed to Chair [] [] [x] [] [] [] [] [] [] [] []    Stand to Sit [] [] [x] [] [] [] [] [] [] [] []     [] [] [] [] [] [] [] [] [] [] []    I=Independent, Mod I=Modified Independent, S=Supervision, SBA=Standby Assistance, CGA=Contact Guard Assistance,   Min=Minimal Assistance, Mod=Moderate Assistance, Max=Maximal Assistance, Total=Total Assistance, NT=Not Tested    BALANCE: Good Fair+ Fair Fair- Poor NT Comments   Sitting Static [x] [] [] [] [] []    Sitting Dynamic [x] [] [] [] [] []              Standing Static [] [x] [] [] [] []    Standing Dynamic [] [x] [] [] [] []      GAIT: I Mod I S SBA CGA Min Mod Max Total  NT x2 Comments:   Level of Assistance [] [] [x] [] [] [] [] [] [] [] []    Distance 300 feet    DME Pushing IV pole    Gait Quality Decreased mason , Decreased step clearance, and Decreased step length    Weightbearing Status      Stairs      I=Independent, Mod I=Modified Independent, S=Supervision, SBA=Standby Assistance, CGA=Contact Guard Assistance,   Min=Minimal Assistance, Mod=Moderate Assistance, Max=Maximal Assistance, Total=Total Assistance, NT=Not Tested    PLAN: FREQUENCY AND DURATION: 3 times/week for duration of hospital stay or until stated goals are met, whichever comes first.    TREATMENT:   TREATMENT:   Co-Treatment PT/OT necessary due to patient's decreased overall endurance/tolerance levels, as well as need for high level skilled assistance to complete functional transfers/mobility and functional tasks  Therapeutic Activity (20 Minutes): Therapeutic activity included Supine to Sit, Scooting, Transfer Training, Ambulation on level ground, Sitting balance , and Standing balance to improve functional Mobility.     TREATMENT GRID:  N/A    AFTER TREATMENT PRECAUTIONS: Call light within reach, Chair, Needs within reach, and RN notified    INTERDISCIPLINARY COLLABORATION:  RN/ PCT, PT/ PTA, and OT/ DURAN    EDUCATION:      TIME IN/OUT:  Time In: 1055  Time Out: 1115  Minutes: [facility],

## 2022-10-25 NOTE — PROGRESS NOTES
's visit with Ms. Reina Bee. Patient is known to me from a prior admission and she has been looking forward to a 's visit. Staff informed of patient's admission and I visited today. Ms. Reina Bee has a positive outlook about her recovery. She is prayerful that God's healing power is able to heal her. She is anticipating chemo after hospital discharge. I offered spiritual interventions, including affirmation of arlette & emotions, exploration of coping mechanisms & support system, and prayer as requested. Ms. Reina Bee welcomes follow-up. She is anticipating discharge tomorrow.  follow-up is planned as patient is available.      Duncan Wray 68  Board Certified

## 2022-10-25 NOTE — TELEPHONE ENCOUNTER
Called pre auth at [phone], long wait time, call disconnected. Called pre auth back, talked to Cee Carlos advised I have faxed an appeal yesterday wanted to get an update, said I need to call plan, advised I have been contacting everyone since Friday with no luck. Rep apologetic but stated this is not something she can help with. Re fax appeal to [phone], per prev [facility] rep's suggestion, I will call back again tomorrow.

## 2022-10-25 NOTE — CARE COORDINATION
DEANA received notification from [facility] with [employer] that pt's home TPN will be covered at 100% by her insurance as she has met her OOP requirement. DEANA spoke with Alfredo Kuo NP to inquire about when pt may be d/c. Meño Ramos stated she will know more after she rounds but it should be either today or tomorrow. CM will f/u with [employer] after rounds. Update: NP stated pt is stable for d/c if she has a BM today. DEANA sent orders to [facility] for [facility] and new TPN and [employer] for new home TPN. If pt does not have BM today anticipate d/c home tomorrow. DEANA will continue to follow.

## 2022-10-25 NOTE — PROGRESS NOTES
[facility] Hematology & Oncology        Inpatient Hematology / Oncology Progress Note    Reason for Admission:  Chronic abdominal pain [R10.9, G89.29]  Metastatic carcinoma (HCC) [C79.9]  Intractable nausea and vomiting [R11.2]  Dysphagia, unspecified type [R13.10]    24 Hour Events:  Afebrile, VSS  On TPN  Ucx +yeast, I/S pending  Consult CM for discharge needs on TPN  Recheck iron studies/nutritional labs  Constipation-start scheduled pericolace and miralax      ROS:  Constitutional: Negative for fever, chills. CV: Negative for chest pain, palpitations, edema. Respiratory: Negative for dyspnea, cough, wheezing. GI: Negative for abdominal pain, diarrhea. +constipation  : +hematuria    10 point review of systems is otherwise negative with the exception of the elements mentioned above in the HPI.        No Known Allergies  Past Medical History:   Diagnosis Date    Anemia     -- not a problem since hyst    Colon cancer ([address] dx 2022     plans for chemo--- followed by dr Cleve Mcduffie    COVID-19 2020    no hospitalization    GERD (gastroesophageal reflux disease)     managed with med    History of colonoscopy 2018    Dr. Susannah Closs, nl (see media note), R     Hx of blood clots 2022    per pt \"small clot on lung identified by CT scan\"  CT Scan impression:- Nonobstructive pulmonary filling defect involving Left Lower Lobe     Hypotension     asymptomatic    Obstruction of fallopian tube     per pt has \"1 good tube\"    Peritoneal carcinomatosis ([address]     Weight loss     80lbs weight loss after gastric sleeve     Past Surgical History:   Procedure Laterality Date     SECTION      CYSTOSCOPY Bilateral 2022    CYSTOSCOPY BILATERAL RETROGRADE PYELOGRAM performed by Abiodun Alfaro MD at [address] Bilateral 2022    BILATERAL CYSTOSCOPY URETERAL STENT EXCHANGE performed by Abiodun Alfaro MD at [address] Bilateral 10/6/2022    CYSTOSCOPY BILATERAL URETERAL STENT REMOVAL, RIGHT URETERAL STENT PLACEMENT performed by Earnest Gupta MD at [facility] MAIN OR    GASTRIC BYPASS SURGERY  07/23/2014    gastric sleeve- [place]    HYSTERECTOMY (CERVIX STATUS UNKNOWN)      2018    HYSTERECTOMY (CERVIX STATUS UNKNOWN)  07/09/2020    TLH w/ Bilateral salpingectomy and left oophorectomy    IR PERC CATH PLEURAL DRAIN W/IMAG  9/26/2022    IR PERC CATH PLEURAL DRAIN W/IMAG 9/26/2022 [facility]    IR PORT PLACEMENT EQUAL OR GREATER THAN 5 YEARS  2/28/2022    IR PORT PLACEMENT EQUAL OR GREATER THAN 5 YEARS  2/28/2022    IR PORT PLACEMENT EQUAL OR GREATER THAN 5 YEARS 2/28/2022 [facility]    LAPAROTOMY N/A 8/17/2022    EXPLORATORY LAPAROTOMY/ ENTEROENTEROSTOMY performed by Charlotte Gomez MD at [facility]. [address]  age Shermerlin Joni 21s\"    also \"unblocked her FT\"    TONSILLECTOMY      UPPER GASTROINTESTINAL ENDOSCOPY      with dilation    UPPER GASTROINTESTINAL ENDOSCOPY N/A 9/13/2022    EGD ESOPHAGOGASTRODUODENOSCOPY OFL 17 To IR after recovery for Para performed by Romelia Mcrae MD at [facility] ENDOSCOPY    UPPER GASTROINTESTINAL ENDOSCOPY N/A 10/21/2022    EGD DILATION BALLOON performed by Peter Beltran MD at [facility] ENDOSCOPY    UROLOGICAL SURGERY Bilateral 03/15/2022    cysto     Family History   Problem Relation Age of Onset    Heart Disease Maternal Grandmother     Hypertension Maternal Grandmother     Heart Disease Maternal Grandfather     Hypertension Maternal Grandfather     Pulmonary Embolism Neg Hx     Colon Cancer Neg Hx     Deep Vein Thrombosis Neg Hx     Ovarian Cancer Neg Hx     Prostate Cancer Neg Hx     Breast Cancer Neg Hx      Social History     Socioeconomic History    Marital status: Single     Spouse name: Not on file    Number of children: Not on file    Years of education: Not on file    Highest education level: Not on file   Occupational History    Not on file   Tobacco Use    Smoking status: Never    Smokeless tobacco: Never   Vaping Use Vaping Use: Never used   Substance and Sexual Activity    Alcohol use: Not Currently    Drug use: No    Sexual activity: Not on file   Other Topics Concern    Not on file   Social History Narrative    1. Use large speculum. 2.  sister, Bing Crowley #[id number] and mom is Martita Leger #[id number] (both Kofoed's pts)  3.   PCP  Dr. Nanette Vines ([facility] GI Dr. Brett Collins normal EGD     Social Determinants of Health     Financial Resource Strain: Not on file   Food Insecurity: Not on file   Transportation Needs: Not on file   Physical Activity: Not on file   Stress: Not on file   Social Connections: Not on file   Intimate Partner Violence: Not on file   Housing Stability: Not on file     Current Facility-Administered Medications   Medication Dose Route Frequency Provider Last Rate Last Admin    sennosides-docusate sodium (SENOKOT-S) 8.6-50 MG tablet 2 tablet  2 tablet Oral Daily SCOTT Gastelum NP   2 tablet at 10/24/22 1112    polyethylene glycol (GLYCOLAX) packet 17 g  17 g Oral Daily SCOTT Gastelum NP   17 g at 10/24/22 1112    PN-Adult Premix 4.25/10 - [place]   IntraVENous Continuous TPN Jordon Arredondo MD 85 mL/hr at 10/24/22 1835 New Bag at 10/24/22 1835    calcium carbonate (TUMS) chewable tablet 500 mg  500 mg Oral TID PRN Jordon Arredondo MD   500 mg at 10/25/22 0527    fluconazole (DIFLUCAN) tablet 200 mg  200 mg Oral Daily Wright Seip, APRN - CNP   200 mg at 10/24/22 0824    0.9 % sodium chloride infusion   IntraVENous PRN Jordon Arredondo MD        [facility] [place] by provider] fat emulsion 20 % infusion 250 mL  250 mL IntraVENous Daily Jordon Arredondo MD   Stopped at 10/24/22 0625    HYDROcodone-acetaminophen (NORCO) 5-325 MG per tablet 1 tablet  1 tablet Oral Q6H PRN Wright Seip, APRN - CNP        pantoprazole (PROTONIX) 40 mg in sodium chloride (PF) 10 mL injection  40 mg IntraVENous BID SCOTT Ochoa NP   40 mg at 10/24/22 2050    sodium chloride flush 0.9 % injection 5-40 mL 5-40 mL IntraVENous 2 times per day Gearl Pucker, DO   10 mL at 10/24/22 2106    sodium chloride flush 0.9 % injection 5-40 mL  5-40 mL IntraVENous PRN Gearl Pucker, DO        0.9 % sodium chloride infusion   IntraVENous PRN Gearl Pucker, DO        ondansetron (ZOFRAN-ODT) disintegrating tablet 4 mg  4 mg Oral Q8H PRN Gearl Pucker, DO        Or    ondansetron [facility]) injection 4 mg  4 mg IntraVENous Q6H PRN Gearl Pucker, DO   4 mg at 10/24/22 2317    acetaminophen (TYLENOL) tablet 650 mg  650 mg Oral Q6H PRN Gearl Pucker, DO        Or    acetaminophen (TYLENOL) suppository 650 mg  650 mg Rectal Q6H PRN Gearl Pucker, DO        hyoscyamine (LEVSIN/SL) sublingual tablet 125 mcg  125 mcg SubLINGual Q4H PRN Gearl Pucker, DO        glucose chewable tablet 16 g  4 tablet Oral PRN Gearl Pucker, DO        dextrose bolus 10% 125 mL  125 mL IntraVENous PRN Gearl Pucker, DO        Or    dextrose bolus 10% 250 mL  250 mL IntraVENous PRN Gearl Pucker, DO        glucagon (rDNA) injection 1 mg  1 mg SubCUTAneous PRN Gearl Pucker, DO        dextrose 10 % infusion   IntraVENous Continuous PRN Gearl Pucker, DO        HYDROmorphone HCl PF (DILAUDID) injection 0.5 mg  0.5 mg IntraVENous Q4H PRN Gearl Pucker, DO   0.5 mg at 10/25/22 0510    metoclopramide (REGLAN) injection 5 mg  5 mg IntraVENous q8h Gearl Pucker, DO   5 mg at 10/25/22 0510    [Held by provider] apixaban (ELIQUIS) tablet 5 mg  5 mg Oral BID Gearl Pucker, DO   5 mg at 10/22/22 2036       OBJECTIVE:  Patient Vitals for the past 8 hrs:   BP Temp Temp src Pulse Resp SpO2   10/25/22 0540 -- -- -- -- 16 --   10/25/22 0510 -- -- -- -- 16 --   10/25/22 0229 124/86 98 °F (36.7 °C) Oral 72 16 96 %     Temp (24hrs), Av °F (36.7 °C), Min:97.2 °F (36.2 °C), Max:99 °F (37.2 °C)    No intake/output data recorded. Physical Exam:  Constitutional: Chronically ill-appearing female in no acute distress, sitting comfortably in the hospital bed.     HEENT: Normocephalic and atraumatic. Oropharynx is clear, mucous membranes are moist.  Extraocular muscles are intact. Sclerae anicteric. Neck supple without JVD. No thyromegaly present. Skin Warm and dry. No bruising and no rash noted. No erythema. No pallor. Respiratory Lungs are clear to auscultation bilaterally without wheezes, rales or rhonchi, normal air exchange without accessory muscle use. CVS Normal rate, regular rhythm and normal S1 and S2. No murmurs, gallops, or rubs. Abdomen Soft, nontender and nondistended, normoactive bowel sounds. No palpable mass. No hepatosplenomegaly. Neuro Grossly nonfocal with no obvious sensory or motor deficits. MSK Normal range of motion in general.  No edema and no tenderness. Psych Appropriate mood and affect.         Labs:    Recent Results (from the past 24 hour(s))   Basic Metabolic Panel    Collection Time: 10/24/22 10:19 AM   Result Value Ref Range    Sodium 136 133 - 143 mmol/L    Potassium 4.1 3.5 - 5.1 mmol/L    Chloride 107 101 - 110 mmol/L    CO2 27 21 - 32 mmol/L    Anion Gap 2 2 - 11 mmol/L    Glucose 110 (H) 65 - 100 mg/dL    BUN 13 6 - 23 MG/DL    Creatinine 0.40 (L) 0.6 - 1.0 MG/DL    Est, Glom Filt Rate >60 >60 ml/min/1.73m2    Calcium 8.1 (L) 8.3 - 10.4 MG/DL   Magnesium    Collection Time: 10/24/22 10:19 AM   Result Value Ref Range    Magnesium 1.7 (L) 1.8 - 2.4 mg/dL   Phosphorus    Collection Time: 10/24/22 10:19 AM   Result Value Ref Range    Phosphorus 3.2 2.5 - 4.5 MG/DL   CBC with Auto Differential    Collection Time: 10/25/22  4:00 AM   Result Value Ref Range    WBC 4.1 (L) 4.3 - 11.1 K/uL    RBC 3.24 (L) 4.05 - 5.2 M/uL    Hemoglobin 8.8 (L) 11.7 - 15.4 g/dL    Hematocrit 28.5 (L) 35.8 - 46.3 %    MCV 88.0 82 - 102 FL    MCH 27.2 26.1 - 32.9 PG    MCHC 30.9 (L) 31.4 - 35.0 g/dL    RDW 19.0 (H) 11.9 - 14.6 %    Platelets 060 253 - 092 K/uL    MPV 10.7 9.4 - 12.3 FL    nRBC 0.00 0.0 - 0.2 K/uL    Differential Type AUTOMATED      Seg Neutrophils 61 43 - 78 %    Lymphocytes 30 13 - 44 %    Monocytes 5 4.0 - 12.0 %    Eosinophils % 3 0.5 - 7.8 %    Basophils 0 0.0 - 2.0 %    Immature Granulocytes 1 0.0 - 5.0 %    Segs Absolute 2.5 1.7 - 8.2 K/UL    Absolute Lymph # 1.2 0.5 - 4.6 K/UL    Absolute Mono # 0.2 0.1 - 1.3 K/UL    Absolute Eos # 0.1 0.0 - 0.8 K/UL    Basophils Absolute 0.0 0.0 - 0.2 K/UL    Absolute Immature Granulocyte 0.0 0.0 - 0.5 K/UL   Magnesium    Collection Time: 10/25/22  4:00 AM   Result Value Ref Range    Magnesium 2.0 1.8 - 2.4 mg/dL   Comprehensive Metabolic Panel    Collection Time: 10/25/22  4:00 AM   Result Value Ref Range    Sodium 138 133 - 143 mmol/L    Potassium 4.2 3.5 - 5.1 mmol/L    Chloride 104 101 - 110 mmol/L    CO2 30 21 - 32 mmol/L    Anion Gap 4 2 - 11 mmol/L    Glucose 79 65 - 100 mg/dL    BUN 12 6 - 23 MG/DL    Creatinine 0.30 (L) 0.6 - 1.0 MG/DL    Est, Glom Filt Rate >60 >60 ml/min/1.73m2    Calcium 8.4 8.3 - 10.4 MG/DL    Total Bilirubin 0.3 0.2 - 1.1 MG/DL    ALT 9 (L) 12 - 65 U/L    AST 12 (L) 15 - 37 U/L    Alk Phosphatase 63 50 - 136 U/L    Total Protein 5.4 (L) 6.3 - 8.2 g/dL    Albumin 2.4 (L) 3.5 - 5.0 g/dL    Globulin 3.0 2.8 - 4.5 g/dL    Albumin/Globulin Ratio 0.8 0.4 - 1.6     Phosphorus    Collection Time: 10/25/22  4:00 AM   Result Value Ref Range    Phosphorus 3.3 2.5 - 4.5 MG/DL   Ferritin    Collection Time: 10/25/22  4:00 AM   Result Value Ref Range    Ferritin 109 8 - 388 NG/ML   Transferrin Saturation    Collection Time: 10/25/22  4:00 AM   Result Value Ref Range    Iron 48 35 - 150 ug/dL    TIBC 131 (L) 250 - 450 ug/dL    TRANSFERRIN SATURATION 37 >20 %   Vitamin B12    Collection Time: 10/25/22  4:00 AM   Result Value Ref Range    Vitamin B-12 520 193 - 986 pg/mL   Folate    Collection Time: 10/25/22  4:00 AM   Result Value Ref Range    Folate 5.5 3.1 - 17.5 ng/mL       Imaging:  Xray Result (most recent):  XR CHEST LIMITED ONE VIEW 10/20/2022    Narrative  Chest X-ray    INDICATION: Chest pain    AP/PA view of the chest was obtained. FINDINGS: The lungs are clear. There are no infiltrates or effusions. The heart  size is normal.  Vascular port is present. Impression  No acute findings in the chest    CT Result (most recent):  CT ABDOMEN PELVIS W IV CONTRAST 10/20/2022    Narrative  CT OF THE ABDOMEN AND PELVIS    INDICATION: Abdominal pain and nausea, history of peritoneal carcinomatosis. Multiple axial images were obtained through the abdomen and pelvis. Oral  contrast was used for bowel opacification. 100mL of Isovue 370 intravenous  contrast was used for better evaluation of solid organs and vascular structures. Radiation dose reduction techniques were used for this study. All CT scans  performed at this facility use one or all of the following: Automated exposure  control, adjustment of the mA and/or kVp according to patient's size, iterative  reconstruction. COMPARISON: 10/03/2022    FINDINGS:  - LUNG BASES: Small left pleural effusion. Right lung base is clear.    - LIVER: Normal in size and appearance. - GALLBLADDER/BILE DUCTS: Stable mild bile duct dilatation.  - PANCREAS: Normal.  - SPLEEN: Small, mottled enhancement    - ADRENALS: Normal.  - KIDNEYS/URETERS: Right ureteral stent is in place. Decreased right  hydronephrosis. Left ureteral stent has been removed. Only mild residual  prominence of the left collecting system. - BLADDER: Distal end of the right ureteral stent is in the bladder. While  diffuse bladder wall thickening.  - REPRODUCTIVE ORGANS: Post hysterectomy.    - BOWEL: Mild diffuse bowel wall thickening, both small bowel and colon. Esophagus is distended. No bowel distention.  - LYMPH NODES: No significant retroperitoneal, mesenteric, or pelvic adenopathy.  - BONES: No fracture or significant bone lesion.  - VASCULATURE: Normal  - OTHER: Peritoneal nodularity is again noted. There is increased ascites.   Percutaneous strain is present in the right abdomen. Impression  1. Peritoneal carcinomatosis and increased ascites. 2.  Persistent diffuse bowel wall thickening. 3.  Persistent but improved bilateral hydronephrosis. 4.  Diffuse bladder wall thickening. If there are any questions about this report, I can be reached on [employer]  or at [phone]      ASSESSMENT:  Patient Active Problem List   Diagnosis    Fibroids    Paraesophageal hernia    Menorrhagia with regular cycle    Papanicolaou smear of cervix with low grade squamous intraepithelial lesion (LGSIL)    Iron deficiency anemia due to chronic blood loss    Anemia    History of weight loss surgery    Carcinoma of unknown primary ([address]    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 ([address]    Bilateral hydronephrosis    Peritoneal carcinoma ([address]    Peritoneal carcinomatosis ([address]    Hematemesis    Current use of long term anticoagulation    Intractable vomiting    Generalized abdominal pain    LFT elevation    Obstruction of both ureters    Abdominal pain    Hypokalemia    Hypomagnesemia    Hypoalbuminemia    GERD (gastroesophageal reflux disease)    Colitis    Intractable vomiting with nausea    Fever    CINV (chemotherapy-induced nausea and vomiting)    Intractable nausea and vomiting    Severe protein-calorie malnutrition (HCC)    Dysphagia    Metastatic carcinoma (HCC)    Chronic abdominal pain     Ms. Anay Iglesias is a 52 y.o. female admitted on 10/20/2022. The primary encounter diagnosis was Dysphagia, unspecified type. Diagnoses of Chronic abdominal pain and Metastatic carcinoma ([address] were also pertinent to this visit. Gabriele Wilson Her PMH includes sleeve gastrectomy, hydronephrosis s/p ureteral stents with recent R stent exchange with unsuccessful L exchange d/t tumor, recent GIB with neg EGD, recurrent ascites s/p pleurx drain 9/26, DVT on Eliquis, and hx esophageal strictures.  She is a patient of Dr Alexander Robbins with metastatic carcinoma of unknown pimary with known peritoneal carcinomatosis on diagnosis. She completed C9 FOLFOX/Avastin on 9/8/22. Treatment recently changed d/t POD. She is s/p C1D8 paclitaxel/Cyramza on 10/20. She has had multiple recent admissions for intractable nausea and vomiting, most recently from 10/3 - 10/7/22 for colitis vs serosal implants of tumor. She was given CTX/Flagyl and then changed to LVQ/Flagyl. She was discharged home on Flagyl/Cipro. She returned to ED on 10/20 with c/o nausea, vomiting, and abd pain. She reports difficulty swallowing foods and liquids. Feels like food gets stuck for the past 3 days, causing her to vomit any time she eats. CT AP with peritoneal carcinomatosis and increased ascites; persistent diffuse bowel wall thickening; persistent but improved b/l hydronephrosis; and diffuse bladder wall thickening. CXR neg. GI following, plans for EGD with possible dilation today. We were asked for recommendations for our patient. PLAN:  Metastatic carcinoma of unknown primary / peritoneal carcinomatosis  - s/p FOLFOX/Avastin with POD  - s/p C1D8 paclitaxel/Cyramza on 10/20    Nausea / Vomiting / Dysphagia  - CT AP with peritoneal carcinomatosis and increased ascites; persistent diffuse bowel wall thickening; persistent but improved b/l hydronephrosis; and diffuse bladder wall thickening  - GI following, EGD with possible dilation today  - If no improvement after EGD, will consider TPN  - RD consulted  - Check UA   10/22 s/p EGD yesterday with erosive esophagitis, proximal esophageal stricture, and Schatzki ring. On PPI BID. Start TPN. 10/23 Continues on TPN. 10/24 patient is not a candidate for feeding tube due to history of gastric bypass and due to peritoneal carcinomatosis    UTI / Hematuria  - UA c/w UTI  - Ucx ordered  - Start CTX  10/22 Ucx pending. Con't CTX (D2).  10/23 Ucx +yeast, I/S pending. Start Diflucan. Completes CTX today (D3). Gross hematuria noted.   Hold Eliquis    Hx DVT  - on Eliquis  10/23 Hold Eliquis d/t hematuria    Continue home meds  Andres SOPs  AC contraindicated d/t hematuria    Goals and plan of care reviewed with the patient. All questions answered to the best of our ability. Disposition:  TBD pending clinical course. Pt hopeful for discharge before Wed as she wants to get her scheduled tx on Thurs. PT/OT recommend HH.             Janice Munoz, SCOTT - NP   [facility] On[facility]  [address] [place] [address]  Office : [phone]  Fax : [phone]

## 2022-10-25 NOTE — PLAN OF CARE
Problem: Pain  Goal: Verbalizes/displays adequate comfort level or baseline comfort level  Outcome: Progressing     Problem: Skin/Tissue Integrity  Goal: Absence of new skin breakdown  Description: 1. Monitor for areas of redness and/or skin breakdown  2. Assess vascular access sites hourly  3. Every 4-6 hours minimum:  Change oxygen saturation probe site  4. Every 4-6 hours:  If on nasal continuous positive airway pressure, respiratory therapy assess nares and determine need for appliance change or resting period.   Outcome: Progressing     Problem: Safety - Adult  Goal: Free from fall injury  Outcome: Progressing  Flowsheets  Taken 10/25/2022 0230  Free From Fall Injury: Instruct family/caregiver on patient safety  Taken 10/24/2022 2038  Free From Fall Injury: Instruct family/caregiver on patient safety     Problem: ABCDS Injury Assessment  Goal: Absence of physical injury  Outcome: Progressing  Flowsheets  Taken 10/25/2022 0230  Absence of Physical Injury: Implement safety measures based on patient assessment  Taken 10/24/2022 2038  Absence of Physical Injury: Implement safety measures based on patient assessment

## 2022-10-25 NOTE — PROGRESS NOTES
END OF SHIFT SUMMARY:    Significant vitals this shift:    Vitals:    10/24/22 2346 10/25/22 0229 10/25/22 0510 10/25/22 0540   BP:  124/86     Pulse:  72     Resp: 16 16 16 16   Temp:  98 °F (36.7 °C)     TempSrc:  Oral     SpO2:  96%     Weight:       Height:          Significant labs this shift:  0  Tests performed this shift:  0  Orders to be followed up on:  0  Blood products given this shift:  0. Additional events this shift:   Patient rested well this shift. Dilaudid given x2 this shift. Zofran given x1 this shift. Tums tablet and given x2 for heartburn. Patient was rounded on hourly by myself and the patient assistant. No new needs voiced at this time. Oncoming TRI López given report.      I/Os:  +/- this shift: 0  Out: 1,450  Occurrences this Shift:  Urine 0, BM 0, Emesis 0    Warren Munoz RN

## 2022-10-25 NOTE — TELEPHONE ENCOUNTER
Called [employer] Eligibility line at [phone], call was routed everywhere else then disconnected after a long wait time. Rep names Connie stated  department ph is [phone], call was disconnected after transfer. Called  dept back at [phone], call was routed to new toll free phone number for authorization per [phone]. [facility] stated she wont be able to help me with  reference number, that I need to call the phone number on back of member's card. Advised I have done this since Friday and have not gotten anywhere, she has provided me with Jihan BROUSSARD ph number [phone] & [phone]. Called Jihan BROUSSARD at [phone] long hold wait time, talked to Martin Alba stated this case belongs to [employer] and he wont be able to assist with this pt, stated pt plan is [facility] local, explained issue with rep, stated for follow up can call customer service on back of member's card. He was able to ask sup for appeals fax # of [phone] but to follow up was advised to call customer service on back of card.

## 2022-10-26 ENCOUNTER — TELEPHONE (OUTPATIENT)
Dept: ONCOLOGY | Age: 47
End: 2022-10-26

## 2022-10-26 VITALS
HEART RATE: 90 BPM | HEIGHT: 64 IN | RESPIRATION RATE: 16 BRPM | OXYGEN SATURATION: 96 % | BODY MASS INDEX: 18.16 KG/M2 | WEIGHT: 106.38 LBS | DIASTOLIC BLOOD PRESSURE: 79 MMHG | SYSTOLIC BLOOD PRESSURE: 126 MMHG | TEMPERATURE: 98.6 F

## 2022-10-26 LAB
ALBUMIN SERPL-MCNC: 2.2 G/DL (ref 3.5–5)
ALBUMIN/GLOB SERPL: 0.7 {RATIO} (ref 0.4–1.6)
ALP SERPL-CCNC: 59 U/L (ref 50–136)
ALT SERPL-CCNC: 11 U/L (ref 12–65)
ANION GAP SERPL CALC-SCNC: 6 MMOL/L (ref 2–11)
APPEARANCE UR: ABNORMAL
AST SERPL-CCNC: 16 U/L (ref 15–37)
BACTERIA URNS QL MICRO: NEGATIVE /HPF
BASOPHILS # BLD: 0 K/UL (ref 0–0.2)
BASOPHILS NFR BLD: 0 % (ref 0–2)
BILIRUB SERPL-MCNC: 0.2 MG/DL (ref 0.2–1.1)
BILIRUB UR QL: NEGATIVE
BUN SERPL-MCNC: 13 MG/DL (ref 6–23)
CALCIUM SERPL-MCNC: 8.5 MG/DL (ref 8.3–10.4)
CASTS URNS QL MICRO: ABNORMAL /LPF
CHLORIDE SERPL-SCNC: 102 MMOL/L (ref 101–110)
CO2 SERPL-SCNC: 30 MMOL/L (ref 21–32)
COLOR UR: ABNORMAL
CREAT SERPL-MCNC: 0.3 MG/DL (ref 0.6–1)
DIFFERENTIAL METHOD BLD: ABNORMAL
EOSINOPHIL # BLD: 0.1 K/UL (ref 0–0.8)
EOSINOPHIL NFR BLD: 2 % (ref 0.5–7.8)
EPI CELLS #/AREA URNS HPF: ABNORMAL /HPF
ERYTHROCYTE [DISTWIDTH] IN BLOOD BY AUTOMATED COUNT: 19.1 % (ref 11.9–14.6)
GLOBULIN SER CALC-MCNC: 3 G/DL (ref 2.8–4.5)
GLUCOSE SERPL-MCNC: 102 MG/DL (ref 65–100)
GLUCOSE UR STRIP.AUTO-MCNC: NEGATIVE MG/DL
HCT VFR BLD AUTO: 27.2 % (ref 35.8–46.3)
HGB BLD-MCNC: 8.4 G/DL (ref 11.7–15.4)
HGB UR QL STRIP: ABNORMAL
IMM GRANULOCYTES # BLD AUTO: 0 K/UL (ref 0–0.5)
IMM GRANULOCYTES NFR BLD AUTO: 0 % (ref 0–5)
KETONES UR QL STRIP.AUTO: NEGATIVE MG/DL
LEUKOCYTE ESTERASE UR QL STRIP.AUTO: ABNORMAL
LYMPHOCYTES # BLD: 1 K/UL (ref 0.5–4.6)
LYMPHOCYTES NFR BLD: 22 % (ref 13–44)
MAGNESIUM SERPL-MCNC: 1.9 MG/DL (ref 1.8–2.4)
MCH RBC QN AUTO: 27.4 PG (ref 26.1–32.9)
MCHC RBC AUTO-ENTMCNC: 30.9 G/DL (ref 31.4–35)
MCV RBC AUTO: 88.6 FL (ref 82–102)
MONOCYTES # BLD: 0.3 K/UL (ref 0.1–1.3)
MONOCYTES NFR BLD: 7 % (ref 4–12)
NEUTS SEG # BLD: 3.1 K/UL (ref 1.7–8.2)
NEUTS SEG NFR BLD: 69 % (ref 43–78)
NITRITE UR QL STRIP.AUTO: NEGATIVE
NRBC # BLD: 0 K/UL (ref 0–0.2)
PH UR STRIP: 8 [PH] (ref 5–9)
PHOSPHATE SERPL-MCNC: 3.2 MG/DL (ref 2.5–4.5)
PLATELET # BLD AUTO: 217 K/UL (ref 150–450)
PMV BLD AUTO: 11 FL (ref 9.4–12.3)
POTASSIUM SERPL-SCNC: 3.9 MMOL/L (ref 3.5–5.1)
PROT SERPL-MCNC: 5.2 G/DL (ref 6.3–8.2)
PROT UR STRIP-MCNC: 30 MG/DL
RBC # BLD AUTO: 3.07 M/UL (ref 4.05–5.2)
RBC #/AREA URNS HPF: >100 /HPF
SODIUM SERPL-SCNC: 138 MMOL/L (ref 133–143)
SP GR UR REFRACTOMETRY: 1.01 (ref 1–1.02)
TRIGL SERPL-MCNC: 326 MG/DL (ref 35–150)
UROBILINOGEN UR QL STRIP.AUTO: 0.2 EU/DL (ref 0.2–1)
WBC # BLD AUTO: 4.5 K/UL (ref 4.3–11.1)
WBC URNS QL MICRO: ABNORMAL /HPF

## 2022-10-26 PROCEDURE — 36592 COLLECT BLOOD FROM PICC: CPT

## 2022-10-26 PROCEDURE — 6370000000 HC RX 637 (ALT 250 FOR IP): Performed by: NURSE PRACTITIONER

## 2022-10-26 PROCEDURE — A4216 STERILE WATER/SALINE, 10 ML: HCPCS | Performed by: NURSE PRACTITIONER

## 2022-10-26 PROCEDURE — 99239 HOSP IP/OBS DSCHRG MGMT >30: CPT | Performed by: INTERNAL MEDICINE

## 2022-10-26 PROCEDURE — 6360000002 HC RX W HCPCS: Performed by: NURSE PRACTITIONER

## 2022-10-26 PROCEDURE — 84100 ASSAY OF PHOSPHORUS: CPT

## 2022-10-26 PROCEDURE — 87086 URINE CULTURE/COLONY COUNT: CPT

## 2022-10-26 PROCEDURE — 2580000003 HC RX 258: Performed by: NURSE PRACTITIONER

## 2022-10-26 PROCEDURE — 2580000003 HC RX 258: Performed by: FAMILY MEDICINE

## 2022-10-26 PROCEDURE — 80053 COMPREHEN METABOLIC PANEL: CPT

## 2022-10-26 PROCEDURE — 85025 COMPLETE CBC W/AUTO DIFF WBC: CPT

## 2022-10-26 PROCEDURE — 84478 ASSAY OF TRIGLYCERIDES: CPT

## 2022-10-26 PROCEDURE — 83735 ASSAY OF MAGNESIUM: CPT

## 2022-10-26 PROCEDURE — 81001 URINALYSIS AUTO W/SCOPE: CPT

## 2022-10-26 PROCEDURE — C9113 INJ PANTOPRAZOLE SODIUM, VIA: HCPCS | Performed by: NURSE PRACTITIONER

## 2022-10-26 PROCEDURE — APPSS180 APP SPLIT SHARED TIME > 60 MINUTES: Performed by: NURSE PRACTITIONER

## 2022-10-26 PROCEDURE — 6360000002 HC RX W HCPCS: Performed by: FAMILY MEDICINE

## 2022-10-26 RX ORDER — LACTULOSE 10 G/15ML
20 SOLUTION ORAL 2 TIMES DAILY
Status: DISCONTINUED | OUTPATIENT
Start: 2022-10-26 | End: 2022-10-26 | Stop reason: HOSPADM

## 2022-10-26 RX ORDER — SODIUM CHLORIDE 0.9 % (FLUSH) 0.9 %
10 SYRINGE (ML) INJECTION PRN
OUTPATIENT
Start: 2022-10-26

## 2022-10-26 RX ORDER — OMEGA-3S/DHA/EPA/FISH OIL/D3 300MG-1000
800 CAPSULE ORAL DAILY
Status: DISCONTINUED | OUTPATIENT
Start: 2022-10-27 | End: 2022-10-26 | Stop reason: HOSPADM

## 2022-10-26 RX ORDER — OMEGA-3S/DHA/EPA/FISH OIL/D3 300MG-1000
800 CAPSULE ORAL DAILY
Qty: 60 TABLET | Refills: 0 | Status: SHIPPED | OUTPATIENT
Start: 2022-10-27

## 2022-10-26 RX ORDER — LACTULOSE 10 G/15ML
20 SOLUTION ORAL 2 TIMES DAILY PRN
Qty: 473 ML | Refills: 0 | Status: SHIPPED | OUTPATIENT
Start: 2022-10-26

## 2022-10-26 RX ORDER — PANTOPRAZOLE SODIUM 40 MG/1
40 TABLET, DELAYED RELEASE ORAL 2 TIMES DAILY
Qty: 60 TABLET | Refills: 0 | Status: SHIPPED | OUTPATIENT
Start: 2022-10-26

## 2022-10-26 RX ORDER — FLUCONAZOLE 200 MG/1
200 TABLET ORAL DAILY
Qty: 9 TABLET | Refills: 0 | Status: SHIPPED | OUTPATIENT
Start: 2022-10-27 | End: 2022-11-05

## 2022-10-26 RX ORDER — METOCLOPRAMIDE 5 MG/1
5 TABLET ORAL 3 TIMES DAILY
Qty: 90 TABLET | Refills: 0 | Status: SHIPPED | OUTPATIENT
Start: 2022-10-26

## 2022-10-26 RX ORDER — BISACODYL 10 MG
10 SUPPOSITORY, RECTAL RECTAL
Status: COMPLETED | OUTPATIENT
Start: 2022-10-26 | End: 2022-10-26

## 2022-10-26 RX ORDER — FOLIC ACID 1 MG/1
1 TABLET ORAL DAILY
Qty: 30 TABLET | Refills: 0 | Status: SHIPPED | OUTPATIENT
Start: 2022-10-27

## 2022-10-26 RX ADMIN — LACTULOSE 20 G: 20 SOLUTION ORAL at 11:39

## 2022-10-26 RX ADMIN — METOCLOPRAMIDE HYDROCHLORIDE 5 MG: 5 INJECTION INTRAMUSCULAR; INTRAVENOUS at 11:40

## 2022-10-26 RX ADMIN — FOLIC ACID 1 MG: 1 TABLET ORAL at 09:48

## 2022-10-26 RX ADMIN — HYDROMORPHONE HYDROCHLORIDE 0.5 MG: 1 INJECTION, SOLUTION INTRAMUSCULAR; INTRAVENOUS; SUBCUTANEOUS at 03:21

## 2022-10-26 RX ADMIN — FLUCONAZOLE IN SODIUM CHLORIDE 200 MG: 2 INJECTION, SOLUTION INTRAVENOUS at 09:52

## 2022-10-26 RX ADMIN — POLYETHYLENE GLYCOL 3350 17 G: 17 POWDER, FOR SOLUTION ORAL at 09:48

## 2022-10-26 RX ADMIN — HYDROMORPHONE HYDROCHLORIDE 0.5 MG: 1 INJECTION, SOLUTION INTRAMUSCULAR; INTRAVENOUS; SUBCUTANEOUS at 10:28

## 2022-10-26 RX ADMIN — ONDANSETRON 4 MG: 2 INJECTION INTRAMUSCULAR; INTRAVENOUS at 09:47

## 2022-10-26 RX ADMIN — METOCLOPRAMIDE HYDROCHLORIDE 5 MG: 5 INJECTION INTRAMUSCULAR; INTRAVENOUS at 03:20

## 2022-10-26 RX ADMIN — PANTOPRAZOLE SODIUM 40 MG: 40 INJECTION, POWDER, LYOPHILIZED, FOR SOLUTION INTRAVENOUS at 09:48

## 2022-10-26 RX ADMIN — BISACODYL 10 MG: 10 SUPPOSITORY RECTAL at 13:50

## 2022-10-26 RX ADMIN — SODIUM CHLORIDE, PRESERVATIVE FREE 10 ML: 5 INJECTION INTRAVENOUS at 09:52

## 2022-10-26 ASSESSMENT — PAIN DESCRIPTION - LOCATION
LOCATION: ABDOMEN
LOCATION: ABDOMEN

## 2022-10-26 ASSESSMENT — PAIN DESCRIPTION - DESCRIPTORS
DESCRIPTORS: ACHING
DESCRIPTORS: DISCOMFORT

## 2022-10-26 ASSESSMENT — PAIN DESCRIPTION - ORIENTATION
ORIENTATION: MID
ORIENTATION: MID

## 2022-10-26 ASSESSMENT — PAIN SCALES - GENERAL
PAINLEVEL_OUTOF10: 7
PAINLEVEL_OUTOF10: 0
PAINLEVEL_OUTOF10: 3
PAINLEVEL_OUTOF10: 7

## 2022-10-26 ASSESSMENT — PAIN - FUNCTIONAL ASSESSMENT: PAIN_FUNCTIONAL_ASSESSMENT: ACTIVITIES ARE NOT PREVENTED

## 2022-10-26 NOTE — DISCHARGE SUMMARY
[facility] Hematology & Oncology: Inpatient Hematology / Oncology Discharge Summary Note    Patient ID:  Shanice Birch  [id number]  19 y.o.  1975    Admit Date: 10/20/2022    Discharge Date: 10/26/2022    Admission Diagnoses: Chronic abdominal pain [R10.9, G89.29]  Metastatic carcinoma ([address] [C79.9]  Intractable nausea and vomiting [R11.2]  Dysphagia, unspecified type [R13.10]    Discharge Diagnoses:  Principal Diagnosis: Intractable nausea and vomiting  Principal Problem:    Intractable nausea and vomiting  Active Problems:    Bilateral hydronephrosis    Peritoneal carcinomatosis ([address]    Current use of long term anticoagulation    Hypokalemia    Hypomagnesemia    Hypoalbuminemia    GERD (gastroesophageal reflux disease)    Severe protein-calorie malnutrition (HCC)    Dysphagia    Metastatic carcinoma (HCC)    Chronic abdominal pain    Constipation    Carcinoma of unknown primary ([address]    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 [facility] Course:  Ms. Dirk Poe is a 52 y.o. female admitted on 10/20/2022. The primary encounter diagnosis was Dysphagia, unspecified type. Diagnoses of Chronic abdominal pain and Metastatic carcinoma ([address] were also pertinent to this visit. Josué Floyd Her PMH includes sleeve gastrectomy, hydronephrosis s/p ureteral stents with recent R stent exchange with unsuccessful L exchange d/t tumor, recent GIB with neg EGD, recurrent ascites s/p pleurx drain 9/26, DVT on Eliquis, and hx esophageal strictures. She is a patient of Dr Lennox Lau with metastatic carcinoma of unknown pimary with known peritoneal carcinomatosis on diagnosis. She completed C9 FOLFOX/Avastin on 9/8/22. Treatment recently changed d/t POD. She is s/p C1D8 paclitaxel/Cyramza on 10/20. She has had multiple recent admissions for intractable nausea and vomiting, most recently from 10/3 - 10/7/22 for colitis vs serosal implants of tumor.   She was given CTX/Flagyl and then changed to LVQ/Flagyl. She was discharged home on Flagyl/Cipro. She returned to ED on 10/20 with c/o nausea, vomiting, and abd pain. She reports difficulty swallowing foods and liquids. Feels like food gets stuck for the past 3 days, causing her to vomit any time she eats. CT AP with peritoneal carcinomatosis and increased ascites; persistent diffuse bowel wall thickening; persistent but improved b/l hydronephrosis; and diffuse bladder wall thickening. CXR neg. GI following, plans for EGD with possible dilation today. We were asked for recommendations for our patient. See course of hospitalization below. She is s/p EGD on 10/21 with erosive esophagitis, proximal esophageal stricture, and Schatzki ring. On PPI BID. She is on TPN and will continue upon discharge. She developed hematuria during admission. Eliquis was held and will continue to be held until further advised. Hematuria improved. UA c/w UTI. She was treated with CTX x 3 days. Ucx +candida tropicalis. On Diflucan. Repeat UA/Ucx pending. She reports constipation, no BM for almost a week. No results with Miralax, pericolace, and lactulose x 1 yesterday. However, pt states she \"feels like things are beginning to move\". She requests suppository today. Pt is not neutropenic or thrombocytopenic. Supp ordered. Pt requests to be discharged home despite not having a BM so that she can see Dr. Elda Driver for f/u tomorrow. She is hopeful to receive her treatment. Advised to call with fever, chills, uncontrollable symptoms, or any other concerns.     Metastatic carcinoma of unknown primary / peritoneal carcinomatosis  - s/p FOLFOX/Avastin with POD  - s/p C1D8 paclitaxel/Cyramza on 10/20     Nausea / Vomiting / Dysphagia  - CT AP with peritoneal carcinomatosis and increased ascites; persistent diffuse bowel wall thickening; persistent but improved b/l hydronephrosis; and diffuse bladder wall thickening  - GI following, EGD with possible dilation today  - If no improvement after EGD, will consider TPN  - RD consulted  - Check UA   10/22 s/p EGD yesterday with erosive esophagitis, proximal esophageal stricture, and Schatzki ring. On PPI BID. Start TPN. 10/23 Continues on TPN. 10/24 patient is not a candidate for feeding tube due to history of gastric bypass and due to peritoneal carcinomatosis     UTI / Hematuria  - UA c/w UTI  - Ucx ordered  - Start CTX  10/22 Ucx pending. Con't CTX (D2).  10/23 Ucx +yeast, I/S pending. Start Diflucan. Completes CTX today (D3). Gross hematuria noted. Hold Eliquis     Hx DVT  - on Eliquis  10/23 Hold Eliquis d/t hematuria     Vitamin D Def  10/25 replacement ordered     Constipation  10/25 no BM since last Thursday.  Drinking miralax had pericolace this am. If no bm this afternoon give lactulose    Consults:  IP CONSULT TO GI  IP CONSULT TO DIETITIAN  IP CONSULT TO ONCOLOGY  IP CONSULT TO DIETITIAN  IP CONSULT TO CASE MANAGEMENT  IP CONSULT HOME HEALTH    Pertinent Diagnostic Studies:   Labs:    Recent Labs     10/24/22  0410 10/25/22  0400 10/26/22  0312   WBC 4.0* 4.1* 4.5   HGB 9.2* 8.8* 8.4*    200 217      Recent Labs     10/24/22  1019 10/25/22  0400 10/26/22  0312    138 138   K 4.1 4.2 3.9    104 102   CO2 27 30 30   BUN 13 12 13   MG 1.7* 2.0 1.9   PHOS 3.2 3.3 3.2       Imaging:  N/a       Medication List        START taking these medications      fluconazole 200 MG tablet  Commonly known as: Diflucan  Take 1 tablet by mouth daily for 9 days  Start taking on: October 27, 4531     folic acid 1 MG tablet  Commonly known as: FOLVITE  Take 1 tablet by mouth daily  Start taking on: October 27, 2022     lactulose 10 GM/15ML solution  Commonly known as: CHRONULAC  Take 30 mLs by mouth 2 times daily as needed (constipation)     vitamin D3 10 MCG (400 UNIT) Tabs tablet  Commonly known as: CHOLECALCIFEROL  Take 2 tablets by mouth daily  Start taking on: October 27, 2022 CHANGE how you take these medications      metoclopramide 5 MG tablet  Commonly known as: Reglan  Take 1 tablet by mouth 3 times daily  What changed: when to take this            CONTINUE taking these medications      lidocaine-prilocaine 2.5-2.5 % cream  Commonly known as: EMLA     ondansetron 8 MG tablet  Commonly known as: ZOFRAN     pantoprazole 40 MG tablet  Commonly known as: PROTONIX  Take 1 tablet by mouth in the morning and at bedtime     polyethylene glycol 17 g packet  Commonly known as: GLYCOLAX  Take 17 g by mouth daily as needed for Constipation     potassium chloride 20 MEQ extended release tablet  Commonly known as: KLOR-CON M  Take 1 tablet by mouth 2 times daily     sennosides-docusate sodium 8.6-50 MG tablet  Commonly known as: SENOKOT-S  Take 2 tablets by mouth daily as needed for Constipation     sucralfate 1 GM tablet  Commonly known as: CARAFATE  Take 1 tablet by mouth 4 times daily            STOP taking these medications      apixaban 5 MG Tabs tablet  Commonly known as: Eliquis     docusate 100 MG Caps  Commonly known as: COLACE, DULCOLAX     gabapentin 300 MG capsule  Commonly known as: NEURONTIN               Where to Get Your Medications        These medications were sent to [facility]/pharmacy #[id number]- [place], [place] - [address] [facility] - P [phone] [facility] [phone]  [address], [address] [place] [address] Ryan Lagunas      Phone: [phone]   fluconazole 398 MG tablet  folic acid 1 MG tablet  lactulose 10 GM/15ML solution  metoclopramide 5 MG tablet  pantoprazole 40 MG tablet  vitamin D3 10 MCG (400 UNIT) Tabs tablet           OBJECTIVE:  Patient Vitals for the past 8 hrs:   BP Temp Temp src Pulse Resp SpO2   10/26/22 1200 126/79 98.6 °F (37 °C) Oral 90 16 96 %   10/26/22 0830 120/89 99.9 °F (37.7 °C) Oral 91 18 97 %     Temp (24hrs), Av.1 °F (37.3 °C), Min:98.2 °F (36.8 °C), Max:99.9 °F (37.7 °C)    10/26 0701 - 10/26 1900  In: 120 [P.O.:120]  Out: 600 [Urine:600]    Physical Exam:  Constitutional: Chronically ill-appearing female in no acute distress, sitting comfortably in the hospital bed. HEENT: Normocephalic and atraumatic. Oropharynx is clear, mucous membranes are moist.  Extraocular muscles are intact. Sclerae anicteric. Skin Warm and dry. No bruising and no rash noted. No erythema. No pallor. Respiratory Lungs are clear to auscultation bilaterally without wheezes, rales or rhonchi, normal air exchange without accessory muscle use. CVS Normal rate, regular rhythm and normal S1 and S2. No murmurs, gallops, or rubs. Abdomen Soft, nontender and nondistended, normoactive bowel sounds. No palpable mass. No hepatosplenomegaly. Neuro Grossly nonfocal with no obvious sensory or motor deficits. MSK Normal range of motion in general.  No edema and no tenderness. Psych Appropriate mood and affect. 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 *      DISPOSITION:  Follow up with Dr. Danyel Jennings on 10/27 as previously scheduled      Over 45 minutes was spent in discharge planning and coordination of care.             SCOTT Goins - [address] Hematology & Oncology  [address] [address]  Office : [phone]  Fax : [phone]

## 2022-10-26 NOTE — PROGRESS NOTES
Comprehensive Nutrition Assessment    Type and Reason for Visit: Reassess  TPN Management (Oncology) and Poor Intake/Appetite 5 or More Days (Hospitalists)    Nutrition Recommendations/Plan:   Meals and Snacks:  Diet: Continue current order GI Soft for pleasure   Nutrition Supplement Therapy:  Medical food supplement therapy:  Discontinue Ensure Clear as pt with minimal intake of supplements   Parenteral Nutrition:  Central parenteral nutrition  begins at 1800 today  Continue: Dex 10% , 4.25% AA 2 L (85ml/hr)   2 times/week 250 ml 20% lipids daily  To provide an average of: 1162 kcal/d (78% of needs), 85 grams of protein/d (100% of needs), 200 grams of CHO/d and ~2071ml of total volume/d. Lytes/L: no changes warranted to lytes today  Sodium ( 85 meq NaCl and 65 meq NaAcetate), Potassium ( 15 meq KCl) Phosphorus ( 12.5 mmol KPO4), Calcium (4.5 meq), Magnesium 10 meq   Solution formulated to be 1:1 Cl: Acetate ratio. Other additives:   MVI Monday Wednesday Friday due to national shortages, MTE  Nutrition Related Medication Management: Thiamine  mg x 7 days (day 5 of 7 days)  Labs:   CMP daily per MD  Mg daily per MD  Phos MWF    Triglyceride weekly ; Fridays  POC Glucoses/SSI Not indicated     Malnutrition Assessment:  Malnutrition Status: Severe malnutrition  Context: Chronic Illness  Findings of clinical characteristics of malnutrition:   Energy Intake:  Mild decrease in energy intake (Comment) (unable to tolerate even water for unknown time frame)  Weight Loss:  Greater than 20% over 1 year (42# (30.9%))     Body Fat Loss:  Severe body fat loss Triceps, Fat Overlying Ribs   Muscle Mass Loss:  Severe muscle mass loss Temples (temporalis), Clavicles (pectoralis & deltoids), Scapula (trapezius)  Fluid Accumulation:  No significant fluid accumulation     Strength:  Not Performed     Nutrition Assessment:  Nutrition History: Patient known to RD from recent admission.  She was eating normally prior to recent admission. Usual intake was 3 meals per day. She has historically not tolerated Ensure/Boost products. PO intake last admission was 1-25% of meals but did drink Ensure Clear well. Today she states that she has been having difficulty swallowing and everything she tried to eat or drink has been coming back up. She states that is progressively got worse until she was unable to tolerate even water. Do You Have Any Cultural, Adventist, or Ethnic Food Preferences?: Yes (Comment)   Nutrition Background:       Patient with PMH significant for gastric sleeve, hydronephrosis with stents and recent right stent exchange (left unable to be exchanged due to tumor), metastatic adeno with suspected GI origin with peritoneal carcinomatosis on diagnosis, debulking unsuccessful, recent GIB, DVT, Pleurx placement 9/26/22. She presented with abdominal pain, difficulty swallowing \"stuck in throat and vomits. \" She was supposed to have outpt EGD but presented to ER with intractable nausea and vomiting. She is now s/p EGD 10/21 with findings of LA grade D esophagitis, proximal esophageal stricture, Sckatzki's ring. Nutrition Interval:  Right single lumen port in place. Dilute TPN initiated 10/222 with lipids. Per GI op note from EGD 10/22 \"Full liquids today; advance tomorrow as tolerated. \" TPN to goal 10/23. Diet liberalized to GI Soft, primarily for pleasure. Pt seen at bedside, RN present. RN and pt report pending discharge. Discharge dependent on BM. BR ordered. TPN infusing per ordered rate. Pt with questions regarding OP/home TPN infusion, RD deferred to OP RN with infusion company.      Abdominal Status (last documented):   Last BM (including prior to admit): 10/20/22, GI Symptoms: Distention, Nausea, Reduction in appetite   Pertinent Medications: tums, Rocephin, Reglan, Zofran PRN (utilizing daily), Protonix, difulcan, folvite, glycolax, senna, lactulose, dulcolax  Electrolyte replacements: 2 gm Mag 10/22  Pertinent Labs:   Lab Results   Component Value Date/Time     10/26/2022 03:12 AM    K 3.9 10/26/2022 03:12 AM     10/26/2022 03:12 AM    CO2 30 10/26/2022 03:12 AM    BUN 13 10/26/2022 03:12 AM    CREATININE 0.30 10/26/2022 03:12 AM    GLUCOSE 102 10/26/2022 03:12 AM    CALCIUM 8.5 10/26/2022 03:12 AM    PHOS 3.2 10/26/2022 03:12 AM    MG 1.9 10/26/2022 03:12 AM     Lab Results   Component Value Date/Time    TRIG 326 10/26/2022 03:12 AM       Remarkable for K, Mg and Phos WNL. Na remains WNL. Triglyceride labs now elevated but falls within range to do 2x weekly lipid infusions. Current Nutrition Therapies:  ADULT ORAL NUTRITION SUPPLEMENT; Breakfast, Lunch, Dinner; Clear Liquid Oral Supplement  ADULT ORAL NUTRITION SUPPLEMENT; Lunch, Dinner; Low Calorie/High Protein Oral Supplement  ADULT DIET; Regular; GI Faulkner (GERD/Peptic Ulcer)  PN-Adult Premix 4.25/10 - Central Line  Current Parenteral Nutrition Orders:  Type and Formula: Premix Central (DEX 10%, AA 4.25%)   Lipids:  (held overnight)  Duration: Continuous  Rate/Volume: 2L (85 ml/hr)  Current PN Order Provides: infusing per ordered rate  Goal PN Orders Provides: 1520 kcal/d (100% of needs), 85 grams of protein/d (100% of needs), 200 grams of CHO/d and ~2250 ml of total volume/d.       Current Intake:   Average Meal Intake: 1-25% Average Supplements Intake: 0%      Anthropometric Measures:  Height: 5' 4\" (162.6 cm)  Current Body Wt: 106 lb 7.7 oz (48.3 kg) (10/24), Weight source: Standing Scale  BMI: 18.3, Underweight (BMI less than 18.5)  Admission Body Weight: 92 lb (41.7 kg) (10/20 office wt)  Ideal Body Weight (Kg) (Calculated): 55 kg (120 lbs), 88.7 %  Usual Body Wt: 136 lb (61.7 kg) (10/19/21 office visit), Percent weight change: -30.9       Edema:Peripheral Vascular  Peripheral Vascular (WDL): Within Defined Limits  Edema: None  Edema Generalized: None   BMI Category Underweight (BMI less than 18.5)    Estimated Daily Nutrient Needs:  Energy (kcal/day): 0156-6479 (Kcal/kg (35-40) Weight used: 42.6 kg Current  Protein (g/day): 64-85 (1.2-5 g/kg) Weight Used: (Current) 42.6 kg  Fluid (ml/day):   (1 ml/kcal)    Nutrition Diagnosis:   Inadequate oral intake related to swallowing difficulty as evidenced by  (reported barrier to PO, intake as above, erosive esophagitis)    Severe malnutrition related to inadequate protein-energy intake as evidenced by Criteria as identified in malnutrition assessment    Nutrition Interventions:   Food and/or Nutrient Delivery: Continue Current Diet, Modify Parenteral Nutrition     Coordination of Nutrition Care: Continue to monitor while inpatient  Plan of Care discussed with: Emanuel Abarca RN    Goals:   Previous Goal Met: Goal(s) Achieved  Active Goal:  (Continune to maintain TPN at goal rate for primary nutrition)       Nutrition Monitoring and Evaluation:      Food/Nutrient Intake Outcomes: Food and Nutrient Intake, Parenteral Nutrition Intake/Tolerance  Physical Signs/Symptoms Outcomes: Biochemical Data, GI Status, Fluid Status or Edema, Weight    Discharge Planning:    Parenteral Nutrition    Meryl Ritchie, [address] [facility]

## 2022-10-26 NOTE — PROGRESS NOTES
's follow-up visit with Ms. Rubia Barajas. She is anticipating hospital discharge today. I offered spiritual support including prayer as requested. Ms. Rubia Barajas expressed gratitude for the visit.      Duncan Darling 68  Board Certified

## 2022-10-26 NOTE — PROGRESS NOTES
Discharge education completed with patient and pt given opportunity to ask questions. Pt educated about the importance of keeping port and tubing all clean to prevent infection. Pt made aware of upcoming appointment and educated on new med medications.      Pt discharged home with family

## 2022-10-26 NOTE — TELEPHONE ENCOUNTER
Called to follow up with vahid, talked to Ruby Ashley, was told need to call health plan, appeals & [employer] does not have direct phone number, unsure who I need to contact, advised [employer] does not give me options for appeal and every time I get a hold of someone I'm always told they are not sure. Asked for sup, was connected to Ely Mings. Was provided with  provider sonia and vahid  phone number [phone].

## 2022-10-26 NOTE — PLAN OF CARE
Patient has remained A&O x 4. Patient educated on medications reglan, zofran, Protonix, diflucan and stool softeners/laxatives. Home TPN started and pt educated on its use. Pt vomited x1 today, but appears to be overall managed with zofran. Urine sample sent to lab. Pt had a medium tan formed bowel movement. Jelly like blood clots seen on patients pad and pt concerned since she does not have a uterus anymore. Janelle Powers, NP made aware. No new orders at this time as pt is on diflucan. To follow up out pt if symptoms worsen. Patient rounded on hourly by myself or patient assistant and encouraged to call with any needs. No signs of distress noted. Bed low, locked, call bell within reach. Jabari Light RN    Problem: Pain  Goal: Verbalizes/displays adequate comfort level or baseline comfort level  10/26/2022 1540 by Jabari Light RN  Outcome: Completed  10/26/2022 0444 by Zachery Jimenez RN  Outcome: Progressing     Problem: Skin/Tissue Integrity  Goal: Absence of new skin breakdown  Description: 1. Monitor for areas of redness and/or skin breakdown  2. Assess vascular access sites hourly  3. Every 4-6 hours minimum:  Change oxygen saturation probe site  4. Every 4-6 hours:  If on nasal continuous positive airway pressure, respiratory therapy assess nares and determine need for appliance change or resting period.   10/26/2022 1540 by Jabari Light RN  Outcome: Completed  10/26/2022 0444 by Zachery Jimenez RN  Outcome: Progressing     Problem: Safety - Adult  Goal: Free from fall injury  10/26/2022 1540 by Jabari Light RN  Outcome: Completed  10/26/2022 0444 by Zachery Jimenez RN  Outcome: Progressing  Flowsheets  Taken 10/26/2022 0230  Free From Fall Injury: Instruct family/caregiver on patient safety  Taken 10/25/2022 2003  Free From Fall Injury: Instruct family/caregiver on patient safety     Problem: ABCDS Injury Assessment  Goal: Absence of physical injury  10/26/2022 1540 by Jabari Light RN  Outcome: Completed  Flowsheets (Taken 10/26/2022 0915)  Absence of Physical Injury: Implement safety measures based on patient assessment  10/26/2022 0444 by Natasha Owens RN  Outcome: Progressing  Flowsheets  Taken 10/26/2022 0230  Absence of Physical Injury: Implement safety measures based on patient assessment  Taken 10/25/2022 2003  Absence of Physical Injury: Implement safety measures based on patient assessment

## 2022-10-26 NOTE — PLAN OF CARE
Problem: Pain  Goal: Verbalizes/displays adequate comfort level or baseline comfort level  Outcome: Progressing     Problem: Skin/Tissue Integrity  Goal: Absence of new skin breakdown  Description: 1. Monitor for areas of redness and/or skin breakdown  2. Assess vascular access sites hourly  3. Every 4-6 hours minimum:  Change oxygen saturation probe site  4. Every 4-6 hours:  If on nasal continuous positive airway pressure, respiratory therapy assess nares and determine need for appliance change or resting period.   Outcome: Progressing     Problem: Safety - Adult  Goal: Free from fall injury  Outcome: Progressing  Flowsheets  Taken 10/26/2022 0230  Free From Fall Injury: Instruct family/caregiver on patient safety  Taken 10/25/2022 2003  Free From Fall Injury: Instruct family/caregiver on patient safety     Problem: ABCDS Injury Assessment  Goal: Absence of physical injury  Outcome: Progressing  Flowsheets  Taken 10/26/2022 0230  Absence of Physical Injury: Implement safety measures based on patient assessment  Taken 10/25/2022 2003  Absence of Physical Injury: Implement safety measures based on patient assessment

## 2022-10-26 NOTE — PROGRESS NOTES
Was called into room by patient assistant. Patient assistant was waiting for patient to come out of the bathroom and had the door closed to give the patient some privacy. Patient was found on the floor sitting on her bottom. Patient stated she did not hit her head and that she did not feel dizzy, lightheaded, or weak. Vitals were a heart rate of 113 and a blood pressure of 134/108. Called Jim Tompkins NP and no new orders were placed this time.

## 2022-10-26 NOTE — CARE COORDINATION
Pt to d/c home today. CM has sent FARRAH order to [facility]: SN and PT as well as home TPN order to [facility]. Coreen with [facility] will call pt and arrange a time for him to come to the hospital to hook up her TPN prior to her d/c. No other supportive care needs identified. Pt agrees with d/c plan. Milestones met. 10/20/22 6263   Service Assessment   Patient Orientation Alert and Oriented;Person;Place; Self   Cognition Alert   History Provided By Patient;Medical Record   Primary Caregiver Self   Support Systems Family Members; Other (Comment)  [facility]   [address] [facility] is: Legal Next of [address] 69   PCP Verified by CM No  (Pt does not have a PCP and declined referral to [facility])   Last Visit to PCP Within last two years  (No PCP)   Prior Functional Level Independent in ADLs/IADLs   Current Functional Level Independent in ADLs/IADLs   Can patient return to prior living arrangement Yes   Ability to make needs known: Good   Family able to assist with home care needs: Yes   Would you like for me to discuss the discharge plan with any other family members/significant others, and if so, who? No   Financial Resources Other (Comment)  ([employer])   Social/Functional History   Lives With Family   Type of [address] One level   Home Access Level entry   [address] chair with back   [facility]   Transfer Assistance Independent   Active  Yes   Mode of Transportation Car   Occupation Works at home   Type of Occupation IVC   Discharge [address] Family Members   Current Services Prior To Admission [address]  ([address])   [address]  (Resume [address])   DME Ordered?  Enteral feedings  (Home TPN through [facility])   Potential Assistance Purchasing Medications No   Type of Home Care Services Nursing Services;PT   Patient expects to be discharged to: House   One/Two Story Residence One story   History of falls? 0   Services At/After Discharge   Transition of Care Consult (CM Consult) Discharge Planning;Home Health;DME/Supply Assistance   Internal Home Health No   Reason Outside Agency Chosen Patient already serviced by other home care/hospice agency  ([address])   [address] Discharge Home Health;IV Therapy  (Resume [address] Home TPN through [facility].)   [address] Provided? No   Mode of Transport at Discharge Other (see comment)  (Family)   Confirm Follow Up Transport Family   Condition of Participation: Discharge Planning   The Plan for Transition of Care is related to the following treatment goals: Pt requires home health to return to functional baseline in the community. The Patient and/or Patient Representative was provided with a Choice of Provider? Patient   The Patient and/Or Patient Representative agree with the Discharge Plan? Yes   Freedom of Choice list was provided with basic dialogue that supports the patient's individualized plan of care/goals, treatment preferences, and shares the quality data associated with the providers?   Yes

## 2022-10-27 ENCOUNTER — OFFICE VISIT (OUTPATIENT)
Dept: ONCOLOGY | Age: 47
End: 2022-10-27

## 2022-10-27 ENCOUNTER — HOSPITAL ENCOUNTER (OUTPATIENT)
Dept: INFUSION THERAPY | Age: 47
Discharge: HOME | End: 2022-10-27

## 2022-10-27 ENCOUNTER — OFFICE VISIT (OUTPATIENT)
Dept: ONCOLOGY | Age: 47
End: 2022-10-27
Payer: COMMERCIAL

## 2022-10-27 ENCOUNTER — TELEPHONE (OUTPATIENT)
Dept: INTERNAL MEDICINE CLINIC | Facility: CLINIC | Age: 47
End: 2022-10-27

## 2022-10-27 ENCOUNTER — HOSPITAL ENCOUNTER (OUTPATIENT)
Dept: INFUSION THERAPY | Age: 47
Discharge: HOME | End: 2022-10-27
Payer: COMMERCIAL

## 2022-10-27 ENCOUNTER — TELEPHONE (OUTPATIENT)
Dept: ONCOLOGY | Age: 47
End: 2022-10-27

## 2022-10-27 VITALS
WEIGHT: 106.2 LBS | DIASTOLIC BLOOD PRESSURE: 93 MMHG | RESPIRATION RATE: 14 BRPM | TEMPERATURE: 99.3 F | HEART RATE: 128 BPM | HEIGHT: 64 IN | BODY MASS INDEX: 18.13 KG/M2 | SYSTOLIC BLOOD PRESSURE: 121 MMHG | OXYGEN SATURATION: 97 %

## 2022-10-27 DIAGNOSIS — C80.1 METASTASIS TO PERITONEUM OF UNKNOWN PRIMARY (HCC): Primary | ICD-10-CM

## 2022-10-27 DIAGNOSIS — C78.6 METASTASIS TO PERITONEUM OF UNKNOWN PRIMARY (HCC): ICD-10-CM

## 2022-10-27 DIAGNOSIS — Z00.8 NUTRITIONAL ASSESSMENT: Primary | ICD-10-CM

## 2022-10-27 DIAGNOSIS — C80.1 CARCINOMA OF UNKNOWN PRIMARY (HCC): ICD-10-CM

## 2022-10-27 DIAGNOSIS — C80.1 METASTASIS TO PERITONEUM OF UNKNOWN PRIMARY (HCC): ICD-10-CM

## 2022-10-27 DIAGNOSIS — C78.6 METASTASIS TO PERITONEUM OF UNKNOWN PRIMARY (HCC): Primary | ICD-10-CM

## 2022-10-27 DIAGNOSIS — Z78.9 ON TOTAL PARENTERAL NUTRITION (TPN): ICD-10-CM

## 2022-10-27 PROCEDURE — 96413 CHEMO IV INFUSION 1 HR: CPT

## 2022-10-27 PROCEDURE — 2580000003 HC RX 258: Performed by: INTERNAL MEDICINE

## 2022-10-27 PROCEDURE — 96375 TX/PRO/DX INJ NEW DRUG ADDON: CPT

## 2022-10-27 PROCEDURE — 6360000002 HC RX W HCPCS: Performed by: INTERNAL MEDICINE

## 2022-10-27 PROCEDURE — A4216 STERILE WATER/SALINE, 10 ML: HCPCS | Performed by: INTERNAL MEDICINE

## 2022-10-27 PROCEDURE — 96417 CHEMO IV INFUS EACH ADDL SEQ: CPT

## 2022-10-27 PROCEDURE — 2500000003 HC RX 250 WO HCPCS: Performed by: INTERNAL MEDICINE

## 2022-10-27 PROCEDURE — 99214 OFFICE O/P EST MOD 30 MIN: CPT | Performed by: INTERNAL MEDICINE

## 2022-10-27 RX ORDER — FAMOTIDINE 10 MG/ML
20 INJECTION, SOLUTION INTRAVENOUS ONCE
Status: CANCELLED | OUTPATIENT
Start: 2022-10-27 | End: 2022-10-27

## 2022-10-27 RX ORDER — ONDANSETRON 2 MG/ML
8 INJECTION INTRAMUSCULAR; INTRAVENOUS
Status: CANCELLED | OUTPATIENT
Start: 2022-10-27

## 2022-10-27 RX ORDER — EPINEPHRINE 1 MG/ML
0.3 INJECTION, SOLUTION, CONCENTRATE INTRAVENOUS PRN
Status: CANCELLED | OUTPATIENT
Start: 2022-10-27

## 2022-10-27 RX ORDER — SODIUM CHLORIDE 0.9 % (FLUSH) 0.9 %
5-40 SYRINGE (ML) INJECTION PRN
Status: CANCELLED | OUTPATIENT
Start: 2022-10-27

## 2022-10-27 RX ORDER — DIPHENHYDRAMINE HYDROCHLORIDE 50 MG/ML
50 INJECTION INTRAMUSCULAR; INTRAVENOUS ONCE
Status: CANCELLED | OUTPATIENT
Start: 2022-10-27 | End: 2022-10-27

## 2022-10-27 RX ORDER — ONDANSETRON 2 MG/ML
8 INJECTION INTRAMUSCULAR; INTRAVENOUS ONCE
Status: CANCELLED | OUTPATIENT
Start: 2022-10-27 | End: 2022-10-27

## 2022-10-27 RX ORDER — DIPHENHYDRAMINE HYDROCHLORIDE 50 MG/ML
50 INJECTION INTRAMUSCULAR; INTRAVENOUS
Status: CANCELLED | OUTPATIENT
Start: 2022-10-27

## 2022-10-27 RX ORDER — HEPARIN SODIUM (PORCINE) LOCK FLUSH IV SOLN 100 UNIT/ML 100 UNIT/ML
500 SOLUTION INTRAVENOUS PRN
Status: CANCELLED | OUTPATIENT
Start: 2022-10-27

## 2022-10-27 RX ORDER — SODIUM CHLORIDE 0.9 % (FLUSH) 0.9 %
5-40 SYRINGE (ML) INJECTION PRN
Status: DISCONTINUED | OUTPATIENT
Start: 2022-10-27 | End: 2022-10-28 | Stop reason: HOSPADM

## 2022-10-27 RX ORDER — SODIUM CHLORIDE 9 MG/ML
5-250 INJECTION, SOLUTION INTRAVENOUS PRN
Status: CANCELLED | OUTPATIENT
Start: 2022-10-27

## 2022-10-27 RX ORDER — MEPERIDINE HYDROCHLORIDE 50 MG/ML
12.5 INJECTION INTRAMUSCULAR; INTRAVENOUS; SUBCUTANEOUS PRN
Status: CANCELLED | OUTPATIENT
Start: 2022-10-27

## 2022-10-27 RX ORDER — FAMOTIDINE 10 MG/ML
20 INJECTION, SOLUTION INTRAVENOUS
Status: CANCELLED | OUTPATIENT
Start: 2022-10-27

## 2022-10-27 RX ORDER — ONDANSETRON 2 MG/ML
8 INJECTION INTRAMUSCULAR; INTRAVENOUS ONCE
Status: COMPLETED | OUTPATIENT
Start: 2022-10-27 | End: 2022-10-27

## 2022-10-27 RX ORDER — ALBUTEROL SULFATE 90 UG/1
4 AEROSOL, METERED RESPIRATORY (INHALATION) PRN
Status: CANCELLED | OUTPATIENT
Start: 2022-10-27

## 2022-10-27 RX ORDER — SODIUM CHLORIDE 9 MG/ML
INJECTION, SOLUTION INTRAVENOUS CONTINUOUS
Status: CANCELLED | OUTPATIENT
Start: 2022-10-27

## 2022-10-27 RX ORDER — SODIUM CHLORIDE 9 MG/ML
5-40 INJECTION INTRAVENOUS PRN
Status: CANCELLED | OUTPATIENT
Start: 2022-10-27

## 2022-10-27 RX ORDER — ACETAMINOPHEN 325 MG/1
650 TABLET ORAL
Status: CANCELLED | OUTPATIENT
Start: 2022-10-27

## 2022-10-27 RX ORDER — DIPHENHYDRAMINE HYDROCHLORIDE 50 MG/ML
50 INJECTION INTRAMUSCULAR; INTRAVENOUS ONCE
Status: COMPLETED | OUTPATIENT
Start: 2022-10-27 | End: 2022-10-27

## 2022-10-27 RX ORDER — DEXAMETHASONE SODIUM PHOSPHATE 10 MG/ML
10 INJECTION INTRAMUSCULAR; INTRAVENOUS ONCE
Status: COMPLETED | OUTPATIENT
Start: 2022-10-27 | End: 2022-10-27

## 2022-10-27 RX ORDER — SODIUM CHLORIDE 9 MG/ML
5-250 INJECTION, SOLUTION INTRAVENOUS PRN
Status: DISCONTINUED | OUTPATIENT
Start: 2022-10-27 | End: 2022-10-28 | Stop reason: HOSPADM

## 2022-10-27 RX ADMIN — ONDANSETRON 8 MG: 2 INJECTION INTRAMUSCULAR; INTRAVENOUS at 13:12

## 2022-10-27 RX ADMIN — SODIUM CHLORIDE, PRESERVATIVE FREE 10 ML: 5 INJECTION INTRAVENOUS at 16:40

## 2022-10-27 RX ADMIN — PACLITAXEL 120 MG: 6 INJECTION, SOLUTION, CONCENTRATE INTRAVENOUS at 15:27

## 2022-10-27 RX ADMIN — DEXAMETHASONE SODIUM PHOSPHATE 10 MG: 10 INJECTION INTRAMUSCULAR; INTRAVENOUS at 14:54

## 2022-10-27 RX ADMIN — DIPHENHYDRAMINE HYDROCHLORIDE 50 MG: 50 INJECTION, SOLUTION INTRAMUSCULAR; INTRAVENOUS at 13:06

## 2022-10-27 RX ADMIN — SODIUM CHLORIDE, PRESERVATIVE FREE 10 ML: 5 INJECTION INTRAVENOUS at 13:00

## 2022-10-27 RX ADMIN — SODIUM CHLORIDE 25 ML/HR: 9 INJECTION, SOLUTION INTRAVENOUS at 13:00

## 2022-10-27 RX ADMIN — SODIUM CHLORIDE 386 MG: 9 INJECTION, SOLUTION INTRAVENOUS at 13:43

## 2022-10-27 RX ADMIN — FAMOTIDINE 20 MG: 10 INJECTION, SOLUTION INTRAVENOUS at 14:51

## 2022-10-27 ASSESSMENT — PATIENT HEALTH QUESTIONNAIRE - PHQ9
SUM OF ALL RESPONSES TO PHQ QUESTIONS 1-9: 0
2. FEELING DOWN, DEPRESSED OR HOPELESS: 0
1. LITTLE INTEREST OR PLEASURE IN DOING THINGS: 0
SUM OF ALL RESPONSES TO PHQ QUESTIONS 1-9: 0
SUM OF ALL RESPONSES TO PHQ9 QUESTIONS 1 & 2: 0
SUM OF ALL RESPONSES TO PHQ QUESTIONS 1-9: 0
SUM OF ALL RESPONSES TO PHQ QUESTIONS 1-9: 0

## 2022-10-27 NOTE — PROGRESS NOTES
Arrived to the [facility]. Cyramza/Taxol completed. Patient tolerated well. Any issues or concerns during appointment:  Zofran administered prior to Francie Plana due to previous nausea while patient receiving this medication. Patient aware of next infusion appointment on 11/10 (date) at 10:30 AM (time). Patient instructed to call provider with temperature of 100.4 or greater or nausea/vomiting/ diarrhea or pain not controlled by medications  Discharged via w/c accompanied by mother.

## 2022-10-27 NOTE — TELEPHONE ENCOUNTER
Called provider sonia and appeal  phone number [phone], appeal was received on 10/25 due date 10/28/22, was advised to call back on 10/29 for determination, asked to expedited stated this is already expedited.   Case id [id number]

## 2022-10-27 NOTE — PATIENT INSTRUCTIONS
Patient Instructions from Today's Visit    Reason for Visit:  Prechemo visit    Diagnosis Information:  [email or web address]-[email or web address]-[email or web address]-materials/[email or web address]-[email or web address]      Plan:  -Chemotherapy today    Follow Up:  As scheduled    Recent Lab Results:  No results displayed because visit has over 200 results. Treatment Summary has been discussed and given to patient: n/a        -------------------------------------------------------------------------------------------------------------------  Please call our office at [phone] if you have any  of the following symptoms:   Fever of 100.5 or greater  Chills  Shortness of breath  Swelling or pain in one leg    After office hours an answering service is available and will contact a provider for emergencies or if you are experiencing any of the above symptoms. Patient does express an interest in My Chart. My Chart log in information explained on the after visit summary printout at the [facility] [address] desk.     TRI Chong Chi, RN, BSN  Nurse Navigator  [phone] valente [email or web address]

## 2022-10-27 NOTE — TELEPHONE ENCOUNTER
Called patient to schedule TCV appt following discharge from [facility] 10/26/2022. Dx abd pain and dysphagia. Per patient she see Hematology/ Oncology and declines to schedule with PCP.

## 2022-10-27 NOTE — PROGRESS NOTES
HEMATOLOGY/ONCOLOGY FOLLOWUP  VISIT      Patient Name: Monica Dow             Date of Visit: 10/27/2022  : 1975  Age:47 y.o. Presenting Complaint:  Monica Dow  is seen in follow-up for carcinoma of unknown primary. History of Present Illness:  Ms. Brunilda Quiroz was seen for the first time in our office in 2022. She was a woman of previously good health who began noticing left-sided back discomfort in early 2022. This was associated  ultimately with a sense of difficulty swallowing and ultimately she presented to [facility] for evaluation. Her symptoms were felt to potentially be indicative of a bowel obstruction and she was sent for a CT scan. This study, performed on 2022  was notable for a linear calcific density along the course of the proximal left ureter with associated moderate hydronephrosis potentially indicative of an intraureteral calculus. There was also stranding throughout the retroperitoneal soft tissues anterior  to the left psoas muscle with pelvic ascites. There was also wall thickening involving the descending colon. The question of colitis or serosal implants was raised. The patient had undergone a gastric sleeve placement several years prior. She had  also undergone a negative colonoscopy about 3 years prior to these presentations. There was a history of anemia ultimately resulting in the performance of a hysterectomy approximately 3 years ago for menorrhagia. Because of the CT scan findings, she  was ultimately referred to Dr. Kd Ruby for evaluation of a possible pelvic malignancy. On 2022 he performed a laparoscopy and biopsy with evidence of peritoneal carcinomatosis grossly. A \"peritoneal nodule\" and a \"peritoneal implant\"  were both positive for a poorly differentiated metastatic carcinoma potentially consistent with an upper gastrointestinal primary.   An omental biopsy showed no evidence of malignancy. Immunohistochemistries were positive for cytokeratin 7 and negative  for cytokeratin 20. The tumor was weakly positive for CDX2. The only other positive study was MOC-31. Testing through [facility] demonstrated no mutation and ERB-B2. There was no microsatellite instability and no mismatch repair protein deficiencies. There were no targetable lesions. A PET scan was subsequently performed on  showing elevated FDG uptake in the left pelvis corresponding with a masslike density concerning  for peritoneal carcinomatosis. There was a small volume of free fluid within the peritoneal cavity. There was moderate right hydroureteronephrosis. Despite the pathologic findings, there was no evidence of FDG activity in the upper gastrointestinal  tract. CA-125 was slightly elevated at 44. Given the uncertainty of her diagnosis, the patient went back for additional surgery on February 15 at which time she underwent bilateral ureteral stent placement and bilateral salpingo-oophorectomies. Pathology  from that surgery was notable for poorly differentiated carcinoma with occasional signet ring features. Immunohistochemical stains were most consistent with a tumor of the upper gastrointestinal tract. She is  1 para 1 abortus 0. There is no  family history of cancer. She has had no difficulties with vomiting. She still notes that some food traverses her esophagus slowly. She had undergone a prior dilatation without any evidence of cancer. She subsequently began treatment with FOLFOX ultimately  with the addition of bevacizumab in 2022. She ultimately received 8 cycles of FOLFOX most of them with Avastin. On , she was taken to surgery for evaluation of debulking and HIPEC. Unfortunately, at the time of surgery her tumor was found to be tightly adherent and impossible to debulk.   Despite the FOLFOX, she developed progressive ascites necessitating placement of a Pleurx catheter. Based on this symptomatic progression, a decision was made to place her on paclitaxel and ramucirumab. She returns for follow-up. She is seen again today after having recently been hospitalized. Once again she was having difficulty with swallowing, recurrent vomiting, and indications of overall gut dysfunction undoubtedly secondary to her peritoneal carcinomatosis. She was placed on intravenous fluids and seen by gastroenterology. She underwent an esophageal dilatation as part of her EGD. Most importantly however a decision was made to place her on TPN. Although it is unclear that treatment is working at the present time, she clearly wanted to keep trying and there was no way to do this without being a certain as we can that she was receiving adequate nutrition. She has virtually no pain. She is clearly feeling better since starting the TPN and minimizing her need to take in oral nutrition. She has had no fever, sweats or other constitutional symptoms.    Medications:   Current Outpatient Medications   Medication Sig Dispense Refill    pantoprazole (PROTONIX) 40 MG tablet Take 1 tablet by mouth in the morning and at bedtime 60 tablet 0    metoclopramide (REGLAN) 5 MG tablet Take 1 tablet by mouth 3 times daily 90 tablet 0    sennosides-docusate sodium (SENOKOT-S) 8.6-50 MG tablet Take 2 tablets by mouth daily as needed for Constipation 90 tablet 0    potassium chloride (KLOR-CON M) 20 MEQ extended release tablet Take 1 tablet by mouth 2 times daily 60 tablet 1    lidocaine-prilocaine (EMLA) 2.5-2.5 % cream Apply to port about 45 minutes prior to access      ondansetron (ZOFRAN) 8 MG tablet Take 8 mg by mouth every 8 hours as needed      fluconazole (DIFLUCAN) 200 MG tablet Take 1 tablet by mouth daily for 9 days (Patient not taking: Reported on 10/27/2022) 9 tablet 0    folic acid (FOLVITE) 1 MG tablet Take 1 tablet by mouth daily (Patient not taking: Reported on 10/27/2022) 30 tablet 0 lactulose (CHRONULAC) 10 GM/15ML solution Take 30 mLs by mouth 2 times daily as needed (constipation) (Patient not taking: Reported on 10/27/2022) 473 mL 0    vitamin D3 (CHOLECALCIFEROL) 10 MCG (400 UNIT) TABS tablet Take 2 tablets by mouth daily (Patient not taking: Reported on 10/27/2022) 60 tablet 0    sucralfate (CARAFATE) 1 GM tablet Take 1 tablet by mouth 4 times daily (Patient not taking: Reported on 10/27/2022) 120 tablet 3     No current facility-administered medications for this visit. Facility-Administered Medications Ordered in Other Visits   Medication Dose Route Frequency Provider Last Rate Last Admin    0.9 % sodium chloride infusion  5-250 mL/hr IntraVENous PRN Deepak Burks MD   Stopped at 10/27/22 1640    sodium chloride flush 0.9 % injection 5-40 mL  5-40 mL IntraVENous PRN Deepak Burks MD   10 mL at 10/27/22 1640       Allergies:  No Known Allergies    Review of Systems: The Review of Systems is documented in full in the internal medical record. All systems are negative other than for those noted above. Past Medical History:  Documented in electronic medical record    Past Surgical History:  Documented in electronic medical record    Social History:  Documented in electronic medical record    Family History:  Documented in electronic medical record      Physical Examination:  General Appearance: Chronically ill appearing patient in no acute distress. Vital signs: BP (!) 121/93 Comment: standing  Pulse (!) 128   Temp 99.3 °F (37.4 °C) (Oral)   Resp 14   Ht 5' 4\" (1.626 m)   Wt 106 lb 3.2 oz (48.2 kg)   SpO2 97%   BMI 18.23 kg/m²     Performance status: ECOG Level:3  Distress  Screening Score: PHQ-9 Total Score: 0 (10/27/2022 11:14 AM)  Pain Score:   0 - No pain (fatigue-7)    HEENT: No oral exam.  Neck: Supple. There is no thyromegaly. Lymph nodes: There is no cervical, supraclavicular, axillary or inguinal adenopathy. Breasts: Not examined. Lungs:  The lungs are clear to auscultation and percussion. There is no egophony. There is no chest wall tenderness and no  use of accessory respiratory musculature. Heart: There is no jugular venous distention. The rate is normal and rhythm regular. The S1 and S2 are normal and there are no murmurs or rubs. Abdomen: Soft, non-tender, distended, bowel sounds present and normal, no appreciated hepatosplenomegaly. No palpable masses. Abdominal Pleurx catheter in place. Skin: No rash, petechiae or ecchymoses. No evidence of malignancy. Extremities: No cyanosis, clubbing; trace lower extremity edema. Labs/Imaging:  Lab Results   Component Value Date/Time    WBC 4.5 10/26/2022 03:12 AM    HGB 8.4 10/26/2022 03:12 AM    HCT 27.2 10/26/2022 03:12 AM     10/26/2022 03:12 AM    MCV 88.6 10/26/2022 03:12 AM       Lab Results   Component Value Date/Time     10/26/2022 03:12 AM    K 3.9 10/26/2022 03:12 AM     10/26/2022 03:12 AM    CO2 30 10/26/2022 03:12 AM    BUN 13 10/26/2022 03:12 AM    GFRAA >60 09/27/2022 02:37 AM    GLOB 3.0 10/26/2022 03:12 AM    ALT 11 10/26/2022 03:12 AM       Above results reviewed with patient. ASSESSMENT:   Ms. Kendal Braxton has an apparent metastatic carcinoma of unknown primary. Pathologically, the tumor seems to resemble a process arising in the upper gastrointestinal tract. However, any such primary is  not visible on EGD or PET scanning. Dr. Jessy Salazar presumption is that this may have originated from the colon based on its location and behavior. That too would be somewhat inconsistent with the pathology findings. She has been treated with FOLFOX and Avastin. There was certainly no symptomatic response and in fact she developed progressive and symptomatic ascites suggesting disease progression. As a result, a decision was made to proceed with paclitaxel and ramucirumab.   Because of recurrent hospitalizations, she has really not received much of this planned therapy but will resume today. Protein calorie malnutrition-now on TPN. PLAN:  Proceed with additional paclitaxel and ramucirumab. Hopefully with improved nutrition, she will be able to tolerate the regimen better and stay out of the hospital.  Home TPN provided by homecare company ([facility]).       RESUSCITATION DIRECTIVES/HOSPICE CARE;  Full Support        [facility]    Oncology and Hematology   [place]  [address] [place] [address] [phone]  F [phone]  [email or web address]

## 2022-10-28 LAB
BACTERIA SPEC CULT: NORMAL
SERVICE CMNT-IMP: NORMAL

## 2022-10-28 NOTE — PROGRESS NOTES
Received fax from [facility] regarding approval of Cyramza appeal, uploaded letter to media.

## 2022-10-30 ENCOUNTER — CLINICAL DOCUMENTATION (OUTPATIENT)
Dept: CASE MANAGEMENT | Age: 47
End: 2022-10-30

## 2022-10-30 DIAGNOSIS — C80.1 METASTASIS TO PERITONEUM OF UNKNOWN PRIMARY (HCC): Primary | ICD-10-CM

## 2022-10-30 DIAGNOSIS — C78.6 METASTASIS TO PERITONEUM OF UNKNOWN PRIMARY (HCC): Primary | ICD-10-CM

## 2022-10-30 NOTE — PROGRESS NOTES
Saw patient on 10-27-22 prior to Cyramza/Taxol. Pt is now on TPN. She presents w her mother in wheelchair. She states she is feeling better since TPN start. Lien Schultz is here also to see pt. She will continue to infusion w OV in 2 weeks and encouraged pt to call myself or office with any questions or concerns.  Navigation is following

## 2022-10-31 ENCOUNTER — TELEPHONE (OUTPATIENT)
Dept: ONCOLOGY | Age: 47
End: 2022-10-31

## 2022-10-31 NOTE — TELEPHONE ENCOUNTER
I spoke with Rosaura Borjas RD and the [facility] needs to call [facility] to ask about the labs and when they are due. Rosaura Borjas states she called [facility] and told them that.   LVM

## 2022-10-31 NOTE — TELEPHONE ENCOUNTER
Home health RN Sondra Galeana) called because patient receives TPN infusions, she wants to know if she needs to collect labs.  Please call her at [phone]

## 2022-10-31 NOTE — PROGRESS NOTES
Nutrition F/U:  Assessment:  Pt seen during office visit w/ Dr. Xavier Polanco, receiving Cyramza + Taxol today, then will have next week off. Pt was started on TPN while admitted and remains TPN dependent for 100% of estimated nutrition needs, tolerating small bites/sips of GI soft diet. Pt having minimal output from abdominal pleurx drain, and draining every 3 days. Labs notable for triglycerides (326), other nutrition related lab values WNL. Current BW: 106#, up 14# since last visit here on (10/20). Intervention:  1. Continue w/ GI soft/liquids as tolerated for pleasure   2. Continue w/ TPN per [facility]    Monitoring/Evaluation:  1. RD to follow up during next office visit - follow up wt status, tolerance/intake of po diet, nutrition related lab values w/ TPN, symptom management.       [address] [place] [address], [address], [address] [facility]

## 2022-11-03 ENCOUNTER — TELEPHONE (OUTPATIENT)
Dept: ONCOLOGY | Age: 47
End: 2022-11-03

## 2022-11-03 NOTE — TELEPHONE ENCOUNTER
Ailin Canchola from [facility] called asking if Dr. Alcon Horowitz was going to sign orders for home health medical supplies. Please call Ailin Canchola back using the number and her extension [phone].

## 2022-11-03 NOTE — TELEPHONE ENCOUNTER
Call to [facility] and it will be Dr Meenakshi Panda and I have provided the fax number and office phone number for her. She appreciated the call back.

## 2022-11-10 ENCOUNTER — OFFICE VISIT (OUTPATIENT)
Dept: ONCOLOGY | Age: 47
End: 2022-11-10
Payer: COMMERCIAL

## 2022-11-10 ENCOUNTER — OFFICE VISIT (OUTPATIENT)
Dept: PALLATIVE CARE | Age: 47
End: 2022-11-10
Payer: COMMERCIAL

## 2022-11-10 ENCOUNTER — OFFICE VISIT (OUTPATIENT)
Dept: ONCOLOGY | Age: 47
End: 2022-11-10

## 2022-11-10 ENCOUNTER — HOSPITAL ENCOUNTER (OUTPATIENT)
Dept: INFUSION THERAPY | Age: 47
Discharge: HOME | End: 2022-11-10
Payer: COMMERCIAL

## 2022-11-10 VITALS
HEIGHT: 64 IN | SYSTOLIC BLOOD PRESSURE: 116 MMHG | RESPIRATION RATE: 12 BRPM | DIASTOLIC BLOOD PRESSURE: 88 MMHG | HEART RATE: 98 BPM | BODY MASS INDEX: 17.67 KG/M2 | WEIGHT: 103.5 LBS | TEMPERATURE: 98.6 F | OXYGEN SATURATION: 100 %

## 2022-11-10 DIAGNOSIS — Z00.8 NUTRITIONAL ASSESSMENT: Primary | ICD-10-CM

## 2022-11-10 DIAGNOSIS — C78.6 METASTASIS TO PERITONEUM OF UNKNOWN PRIMARY (HCC): Primary | ICD-10-CM

## 2022-11-10 DIAGNOSIS — C80.1 METASTASIS TO PERITONEUM OF UNKNOWN PRIMARY (HCC): ICD-10-CM

## 2022-11-10 DIAGNOSIS — C80.1 CARCINOMA OF UNKNOWN PRIMARY (HCC): ICD-10-CM

## 2022-11-10 DIAGNOSIS — R63.4 WEIGHT LOSS: Primary | ICD-10-CM

## 2022-11-10 DIAGNOSIS — Z51.5 ENCOUNTER FOR PALLIATIVE CARE: ICD-10-CM

## 2022-11-10 DIAGNOSIS — Z78.9 ON TOTAL PARENTERAL NUTRITION (TPN): ICD-10-CM

## 2022-11-10 DIAGNOSIS — C80.1 METASTASIS TO PERITONEUM OF UNKNOWN PRIMARY (HCC): Primary | ICD-10-CM

## 2022-11-10 DIAGNOSIS — C78.6 METASTASIS TO PERITONEUM OF UNKNOWN PRIMARY (HCC): ICD-10-CM

## 2022-11-10 LAB
ALBUMIN SERPL-MCNC: 2.5 G/DL (ref 3.5–5)
ALBUMIN/GLOB SERPL: 0.6 {RATIO} (ref 0.4–1.6)
ALP SERPL-CCNC: 82 U/L (ref 50–136)
ALT SERPL-CCNC: 16 U/L (ref 12–65)
ANION GAP SERPL CALC-SCNC: 2 MMOL/L (ref 2–11)
AST SERPL-CCNC: 16 U/L (ref 15–37)
BASOPHILS # BLD: 0 K/UL (ref 0–0.2)
BASOPHILS NFR BLD: 1 % (ref 0–2)
BILIRUB SERPL-MCNC: 0.2 MG/DL (ref 0.2–1.1)
BUN SERPL-MCNC: 24 MG/DL (ref 6–23)
CALCIUM SERPL-MCNC: 8.6 MG/DL (ref 8.3–10.4)
CEA SERPL-MCNC: 1.9 NG/ML (ref 0–3)
CHLORIDE SERPL-SCNC: 104 MMOL/L (ref 101–110)
CO2 SERPL-SCNC: 29 MMOL/L (ref 21–32)
CREAT SERPL-MCNC: 0.3 MG/DL (ref 0.6–1)
DIFFERENTIAL METHOD BLD: ABNORMAL
EOSINOPHIL # BLD: 0.5 K/UL (ref 0–0.8)
EOSINOPHIL NFR BLD: 8 % (ref 0.5–7.8)
ERYTHROCYTE [DISTWIDTH] IN BLOOD BY AUTOMATED COUNT: 19.5 % (ref 11.9–14.6)
GLOBULIN SER CALC-MCNC: 4 G/DL (ref 2.8–4.5)
GLUCOSE SERPL-MCNC: 101 MG/DL (ref 65–100)
HCT VFR BLD AUTO: 32.5 % (ref 35.8–46.3)
HGB BLD-MCNC: 9.6 G/DL (ref 11.7–15.4)
IMM GRANULOCYTES # BLD AUTO: 0 K/UL (ref 0–0.5)
IMM GRANULOCYTES NFR BLD AUTO: 0 % (ref 0–5)
LYMPHOCYTES # BLD: 1.5 K/UL (ref 0.5–4.6)
LYMPHOCYTES NFR BLD: 25 % (ref 13–44)
MAGNESIUM SERPL-MCNC: 2.2 MG/DL (ref 1.8–2.4)
MCH RBC QN AUTO: 26.3 PG (ref 26.1–32.9)
MCHC RBC AUTO-ENTMCNC: 29.5 G/DL (ref 31.4–35)
MCV RBC AUTO: 89 FL (ref 82–102)
MONOCYTES # BLD: 0.7 K/UL (ref 0.1–1.3)
MONOCYTES NFR BLD: 12 % (ref 4–12)
NEUTS SEG # BLD: 3.2 K/UL (ref 1.7–8.2)
NEUTS SEG NFR BLD: 54 % (ref 43–78)
NRBC # BLD: 0 K/UL (ref 0–0.2)
PLATELET # BLD AUTO: 422 K/UL (ref 150–450)
PMV BLD AUTO: 10 FL (ref 9.4–12.3)
POTASSIUM SERPL-SCNC: 4.3 MMOL/L (ref 3.5–5.1)
PROT SERPL-MCNC: 6.5 G/DL (ref 6.3–8.2)
PROT UR-MCNC: 52 MG/DL
RBC # BLD AUTO: 3.65 M/UL (ref 4.05–5.2)
SODIUM SERPL-SCNC: 135 MMOL/L (ref 133–143)
WBC # BLD AUTO: 5.8 K/UL (ref 4.3–11.1)

## 2022-11-10 PROCEDURE — 84156 ASSAY OF PROTEIN URINE: CPT

## 2022-11-10 PROCEDURE — 2500000003 HC RX 250 WO HCPCS: Performed by: INTERNAL MEDICINE

## 2022-11-10 PROCEDURE — 80053 COMPREHEN METABOLIC PANEL: CPT

## 2022-11-10 PROCEDURE — 6360000002 HC RX W HCPCS: Performed by: INTERNAL MEDICINE

## 2022-11-10 PROCEDURE — 99203 OFFICE O/P NEW LOW 30 MIN: CPT | Performed by: NURSE PRACTITIONER

## 2022-11-10 PROCEDURE — 96375 TX/PRO/DX INJ NEW DRUG ADDON: CPT

## 2022-11-10 PROCEDURE — 36591 DRAW BLOOD OFF VENOUS DEVICE: CPT

## 2022-11-10 PROCEDURE — 2580000003 HC RX 258: Performed by: INTERNAL MEDICINE

## 2022-11-10 PROCEDURE — 83735 ASSAY OF MAGNESIUM: CPT

## 2022-11-10 PROCEDURE — 96417 CHEMO IV INFUS EACH ADDL SEQ: CPT

## 2022-11-10 PROCEDURE — 85025 COMPLETE CBC W/AUTO DIFF WBC: CPT

## 2022-11-10 PROCEDURE — 82378 CARCINOEMBRYONIC ANTIGEN: CPT

## 2022-11-10 PROCEDURE — 99214 OFFICE O/P EST MOD 30 MIN: CPT | Performed by: INTERNAL MEDICINE

## 2022-11-10 PROCEDURE — 96413 CHEMO IV INFUSION 1 HR: CPT

## 2022-11-10 PROCEDURE — A4216 STERILE WATER/SALINE, 10 ML: HCPCS | Performed by: INTERNAL MEDICINE

## 2022-11-10 RX ORDER — SODIUM CHLORIDE 9 MG/ML
INJECTION, SOLUTION INTRAVENOUS CONTINUOUS
Status: CANCELLED | OUTPATIENT
Start: 2022-11-10

## 2022-11-10 RX ORDER — FAMOTIDINE 10 MG/ML
20 INJECTION, SOLUTION INTRAVENOUS
Status: CANCELLED | OUTPATIENT
Start: 2022-11-10

## 2022-11-10 RX ORDER — ONDANSETRON 2 MG/ML
8 INJECTION INTRAMUSCULAR; INTRAVENOUS ONCE
Status: COMPLETED | OUTPATIENT
Start: 2022-11-10 | End: 2022-11-10

## 2022-11-10 RX ORDER — DEXAMETHASONE SODIUM PHOSPHATE 10 MG/ML
10 INJECTION INTRAMUSCULAR; INTRAVENOUS ONCE
Status: COMPLETED | OUTPATIENT
Start: 2022-11-10 | End: 2022-11-10

## 2022-11-10 RX ORDER — SODIUM CHLORIDE 9 MG/ML
5-250 INJECTION, SOLUTION INTRAVENOUS PRN
Status: DISCONTINUED | OUTPATIENT
Start: 2022-11-10 | End: 2022-11-11 | Stop reason: HOSPADM

## 2022-11-10 RX ORDER — ALBUTEROL SULFATE 90 UG/1
4 AEROSOL, METERED RESPIRATORY (INHALATION) PRN
Status: CANCELLED | OUTPATIENT
Start: 2022-11-10

## 2022-11-10 RX ORDER — DIPHENHYDRAMINE HYDROCHLORIDE 50 MG/ML
50 INJECTION INTRAMUSCULAR; INTRAVENOUS ONCE
Status: COMPLETED | OUTPATIENT
Start: 2022-11-10 | End: 2022-11-10

## 2022-11-10 RX ORDER — SODIUM CHLORIDE 9 MG/ML
5-250 INJECTION, SOLUTION INTRAVENOUS PRN
Status: CANCELLED | OUTPATIENT
Start: 2022-11-17

## 2022-11-10 RX ORDER — SODIUM CHLORIDE 0.9 % (FLUSH) 0.9 %
5-40 SYRINGE (ML) INJECTION PRN
Status: DISCONTINUED | OUTPATIENT
Start: 2022-11-10 | End: 2022-11-11 | Stop reason: HOSPADM

## 2022-11-10 RX ORDER — SODIUM CHLORIDE 9 MG/ML
5-40 INJECTION INTRAVENOUS PRN
Status: CANCELLED | OUTPATIENT
Start: 2022-11-10

## 2022-11-10 RX ORDER — SODIUM CHLORIDE 9 MG/ML
5-250 INJECTION, SOLUTION INTRAVENOUS PRN
Status: CANCELLED | OUTPATIENT
Start: 2022-11-10

## 2022-11-10 RX ORDER — EPINEPHRINE 1 MG/ML
0.3 INJECTION, SOLUTION, CONCENTRATE INTRAVENOUS PRN
Status: CANCELLED | OUTPATIENT
Start: 2022-11-17

## 2022-11-10 RX ORDER — ONDANSETRON 2 MG/ML
8 INJECTION INTRAMUSCULAR; INTRAVENOUS ONCE
Status: CANCELLED | OUTPATIENT
Start: 2022-11-17 | End: 2022-11-17

## 2022-11-10 RX ORDER — DIPHENHYDRAMINE HYDROCHLORIDE 50 MG/ML
50 INJECTION INTRAMUSCULAR; INTRAVENOUS ONCE
Status: CANCELLED | OUTPATIENT
Start: 2022-11-10 | End: 2022-11-10

## 2022-11-10 RX ORDER — ALBUTEROL SULFATE 90 UG/1
4 AEROSOL, METERED RESPIRATORY (INHALATION) PRN
Status: CANCELLED | OUTPATIENT
Start: 2022-11-17

## 2022-11-10 RX ORDER — ONDANSETRON 2 MG/ML
8 INJECTION INTRAMUSCULAR; INTRAVENOUS
Status: CANCELLED | OUTPATIENT
Start: 2022-11-10

## 2022-11-10 RX ORDER — FAMOTIDINE 10 MG/ML
20 INJECTION, SOLUTION INTRAVENOUS ONCE
Status: CANCELLED | OUTPATIENT
Start: 2022-11-17 | End: 2022-11-17

## 2022-11-10 RX ORDER — EPINEPHRINE 1 MG/ML
0.3 INJECTION, SOLUTION, CONCENTRATE INTRAVENOUS PRN
Status: CANCELLED | OUTPATIENT
Start: 2022-11-10

## 2022-11-10 RX ORDER — DIPHENHYDRAMINE HYDROCHLORIDE 50 MG/ML
50 INJECTION INTRAMUSCULAR; INTRAVENOUS
Status: CANCELLED | OUTPATIENT
Start: 2022-11-10

## 2022-11-10 RX ORDER — FAMOTIDINE 10 MG/ML
20 INJECTION, SOLUTION INTRAVENOUS
Status: CANCELLED | OUTPATIENT
Start: 2022-11-17

## 2022-11-10 RX ORDER — SODIUM CHLORIDE 0.9 % (FLUSH) 0.9 %
5-40 SYRINGE (ML) INJECTION PRN
Status: CANCELLED | OUTPATIENT
Start: 2022-11-10

## 2022-11-10 RX ORDER — SODIUM CHLORIDE 0.9 % (FLUSH) 0.9 %
5-40 SYRINGE (ML) INJECTION PRN
Status: CANCELLED | OUTPATIENT
Start: 2022-11-17

## 2022-11-10 RX ORDER — ONDANSETRON 2 MG/ML
8 INJECTION INTRAMUSCULAR; INTRAVENOUS
Status: CANCELLED | OUTPATIENT
Start: 2022-11-17

## 2022-11-10 RX ORDER — DIPHENHYDRAMINE HYDROCHLORIDE 50 MG/ML
50 INJECTION INTRAMUSCULAR; INTRAVENOUS
Status: CANCELLED | OUTPATIENT
Start: 2022-11-17

## 2022-11-10 RX ORDER — FAMOTIDINE 10 MG/ML
20 INJECTION, SOLUTION INTRAVENOUS ONCE
Status: CANCELLED | OUTPATIENT
Start: 2022-11-10 | End: 2022-11-10

## 2022-11-10 RX ORDER — HEPARIN SODIUM (PORCINE) LOCK FLUSH IV SOLN 100 UNIT/ML 100 UNIT/ML
500 SOLUTION INTRAVENOUS PRN
Status: CANCELLED | OUTPATIENT
Start: 2022-11-17

## 2022-11-10 RX ORDER — SODIUM CHLORIDE 9 MG/ML
INJECTION, SOLUTION INTRAVENOUS CONTINUOUS
Status: CANCELLED | OUTPATIENT
Start: 2022-11-17

## 2022-11-10 RX ORDER — ONDANSETRON 2 MG/ML
8 INJECTION INTRAMUSCULAR; INTRAVENOUS ONCE
Status: CANCELLED | OUTPATIENT
Start: 2022-11-10 | End: 2022-11-10

## 2022-11-10 RX ORDER — ACETAMINOPHEN 325 MG/1
650 TABLET ORAL
Status: CANCELLED | OUTPATIENT
Start: 2022-11-17

## 2022-11-10 RX ORDER — HEPARIN SODIUM (PORCINE) LOCK FLUSH IV SOLN 100 UNIT/ML 100 UNIT/ML
500 SOLUTION INTRAVENOUS PRN
Status: CANCELLED | OUTPATIENT
Start: 2022-11-10

## 2022-11-10 RX ORDER — MEPERIDINE HYDROCHLORIDE 50 MG/ML
12.5 INJECTION INTRAMUSCULAR; INTRAVENOUS; SUBCUTANEOUS PRN
Status: CANCELLED | OUTPATIENT
Start: 2022-11-17

## 2022-11-10 RX ORDER — ACETAMINOPHEN 325 MG/1
650 TABLET ORAL
Status: CANCELLED | OUTPATIENT
Start: 2022-11-10

## 2022-11-10 RX ORDER — MEPERIDINE HYDROCHLORIDE 50 MG/ML
12.5 INJECTION INTRAMUSCULAR; INTRAVENOUS; SUBCUTANEOUS PRN
Status: CANCELLED | OUTPATIENT
Start: 2022-11-10

## 2022-11-10 RX ORDER — SODIUM CHLORIDE 9 MG/ML
5-40 INJECTION INTRAVENOUS PRN
Status: CANCELLED | OUTPATIENT
Start: 2022-11-17

## 2022-11-10 RX ORDER — DIPHENHYDRAMINE HYDROCHLORIDE 50 MG/ML
50 INJECTION INTRAMUSCULAR; INTRAVENOUS ONCE
Status: CANCELLED | OUTPATIENT
Start: 2022-11-17 | End: 2022-11-17

## 2022-11-10 RX ADMIN — SODIUM CHLORIDE 386 MG: 9 INJECTION, SOLUTION INTRAVENOUS at 11:30

## 2022-11-10 RX ADMIN — ONDANSETRON 8 MG: 2 INJECTION INTRAMUSCULAR; INTRAVENOUS at 12:37

## 2022-11-10 RX ADMIN — SODIUM CHLORIDE, PRESERVATIVE FREE 10 ML: 5 INJECTION INTRAVENOUS at 14:21

## 2022-11-10 RX ADMIN — PACLITAXEL 120 MG: 6 INJECTION, SOLUTION, CONCENTRATE INTRAVENOUS at 13:15

## 2022-11-10 RX ADMIN — SODIUM CHLORIDE, PRESERVATIVE FREE 10 ML: 5 INJECTION INTRAVENOUS at 09:21

## 2022-11-10 RX ADMIN — SODIUM CHLORIDE, PRESERVATIVE FREE 10 ML: 5 INJECTION INTRAVENOUS at 10:28

## 2022-11-10 RX ADMIN — SODIUM CHLORIDE 150 ML/HR: 9 INJECTION, SOLUTION INTRAVENOUS at 10:30

## 2022-11-10 RX ADMIN — DEXAMETHASONE SODIUM PHOSPHATE 10 MG: 10 INJECTION INTRAMUSCULAR; INTRAVENOUS at 12:43

## 2022-11-10 RX ADMIN — DIPHENHYDRAMINE HYDROCHLORIDE 50 MG: 50 INJECTION, SOLUTION INTRAMUSCULAR; INTRAVENOUS at 10:53

## 2022-11-10 RX ADMIN — FAMOTIDINE 20 MG: 10 INJECTION, SOLUTION INTRAVENOUS at 12:40

## 2022-11-10 ASSESSMENT — ENCOUNTER SYMPTOMS
ABDOMINAL PAIN: 0
NAUSEA: 1

## 2022-11-10 NOTE — PROGRESS NOTES
Arrived to the [facility]. Cyramza and Taxol completed. Patient tolerated well. Any issues or concerns during appointment: none. Patient aware of next infusion appointment on 11/17/22 (date) at 56 (time). Patient aware of next lab and [facility] office visit on 11/25/22 (date) at 56 (time). Patient instructed to call provider with temperature of 100.4 or greater or nausea/vomiting/ diarrhea or pain not controlled by medications  Discharged in [address].

## 2022-11-10 NOTE — PATIENT INSTRUCTIONS
Patient Instructions from Today's Visit    Reason for Visit:  Prechemo visit    Diagnosis Information:  [email or web address]-answers-patient-education-materials/[email or web address]-[email or web address]-[email or web address]    Plan:  -Cyramza/Taxol today  -Continue TPN  -You do not need to be seen by provider on Taxol only weeks  Follow Up:   In one week    Recent Lab Results:  Hospital Outpatient Visit on 11/10/2022   Component Date Value Ref Range Status    WBC 11/10/2022 5.8  4.3 - 11.1 K/uL Final    RBC 11/10/2022 3.65 (A)  4.05 - 5.2 M/uL Final    Hemoglobin 11/10/2022 9.6 (A)  11.7 - 15.4 g/dL Final    Hematocrit 11/10/2022 32.5 (A)  35.8 - 46.3 % Final    MCV 11/10/2022 89.0  82.0 - 102.0 FL Final    MCH 11/10/2022 26.3  26.1 - 32.9 PG Final    MCHC 11/10/2022 29.5 (A)  31.4 - 35.0 g/dL Final    RDW 11/10/2022 19.5 (A)  11.9 - 14.6 % Final    Platelets 18/75/7898 422  150 - 450 K/uL Final    MPV 11/10/2022 10.0  9.4 - 12.3 FL Final    nRBC 11/10/2022 0.00  0.0 - 0.2 K/uL Final    **Note: Absolute NRBC parameter is now reported with Hemogram**    Seg Neutrophils 11/10/2022 54  43 - 78 % Final    Lymphocytes 11/10/2022 25  13 - 44 % Final    Monocytes 11/10/2022 12  4.0 - 12.0 % Final    Eosinophils % 11/10/2022 8 (A)  0.5 - 7.8 % Final    Basophils 11/10/2022 1  0.0 - 2.0 % Final    Immature Granulocytes 11/10/2022 0  0.0 - 5.0 % Final    Segs Absolute 11/10/2022 3.2  1.7 - 8.2 K/UL Final    Absolute Lymph # 11/10/2022 1.5  0.5 - 4.6 K/UL Final    Absolute Mono # 11/10/2022 0.7  0.1 - 1.3 K/UL Final    Absolute Eos # 11/10/2022 0.5  0.0 - 0.8 K/UL Final    Basophils Absolute 11/10/2022 0.0  0.0 - 0.2 K/UL Final    Absolute Immature Granulocyte 11/10/2022 0.0  0.0 - 0.5 K/UL Final    Differential Type 11/10/2022 AUTOMATED    Final        Treatment Summary has been discussed and given to patient: n/a        -------------------------------------------------------------------------------------------------------------------  Please call our office at [phone] if you have any  of the following symptoms:   Fever of 100.5 or greater  Chills  Shortness of breath  Swelling or pain in one leg    After office hours an answering service is available and will contact a provider for emergencies or if you are experiencing any of the above symptoms. Patient does express an interest in My Chart. My Chart log in information explained on the after visit summary printout at the [facility] [address] desk.     TRI Hankins RN, BSN  Nurse Navigator  [phone] cell  [email or web address]

## 2022-11-10 NOTE — PROGRESS NOTES
Outpatient Palliative Care at the  [facility]: Office Visit New Patient H & P    Diagnosis: metastatic carcinoma of unknown primary    Treatment Plan: FOLFOX + Avastin--> Taxol/Cyramza    Treatment Intent: Palliative    Medical Oncologist: Dr. Fatmata Forrest    Radiation Oncologist: N/A    Navigator: Kami Nicole RN      Chief Complaint:    Chief Complaint   Patient presents with    New Patient       History of Present Illness:  Ms. Mariano Dudley is a 52 y.o. female who presents today for evaluation regarding introduction to palliative care. Patient initially presented with back pain, difficulty swallowing in January 2022. A CT on 1/27/22 showed soft tissue stranding throughout retroperitoneum, wall thickening of colon, and ascites. On 2/1/22, Dr. Bailee Colilns performed laparoscopy and biopsy revealing peritoneal carcinomatosis; biopsy showed poorly differentiated metastatic carcinoma potentially consistent with upper GI primary. PET negative for activity in upper GI tract. She had bilateral ureteral stent placement and bilateral salpingo-oophorectomies on 2/15/22- biopsy from that surgery showed poorly differentiated carcinoma with occasional signet ring features. She is followed by Dr. Fatmata Forrest and began FOLFOX in April 2022. Late October, she was hospitalized for dysphagia, intractable vomiting due to peritoneal carcinomatosis. She was started on TPN. Patient lives with her boyfriend and 66CG daughter, Kelvin Castaneda. She lives 1 mile from her mom. Her dad is not involved in her life and she nor her mom know how to contact her dad. Patient seen in clinic in coordination with her office visit with Dr. Fatmata Forrest. She denies pain. Since starting TPN, her GI symptoms have been less burdensome. She has lost 3 pounds in the last 2 weeks; but patient reports less ascites draining from PleurX catheter so hopefully some weight loss can be attributed to decreasing fluid retention.   She denies anxiety, depression, insomnia. Review of Systems:  Review of Systems   Constitutional:  Positive for unexpected weight change. Gastrointestinal:  Positive for nausea. Negative for abdominal pain. Improved with TPN   Psychiatric/Behavioral:  Negative for dysphoric mood and sleep disturbance. The patient is not nervous/anxious. All other systems reviewed and are negative.       No Known Allergies  Past Medical History:   Diagnosis Date    Anemia     -- not a problem since hyst    Colon cancer ([address] dx 2022     plans for chemo--- followed by dr Braden EL-19 2020    no hospitalization    GERD (gastroesophageal reflux disease)     managed with med    History of colonoscopy 2018    Dr. Narinder Crespo, nl (see media note), R     Hx of blood clots 2022    per pt \"small clot on lung identified by CT scan\"  CT Scan impression:- Nonobstructive pulmonary filling defect involving Left Lower Lobe     Hypotension     asymptomatic    Obstruction of fallopian tube     per pt has \"1 good tube\"    Peritoneal carcinomatosis ([address]     Weight loss     80lbs weight loss after gastric sleeve     Past Surgical History:   Procedure Laterality Date     SECTION  2009    CYSTOSCOPY Bilateral 2022    CYSTOSCOPY BILATERAL RETROGRADE PYELOGRAM performed by Audelia Tobias MD at [address] Bilateral 2022    BILATERAL CYSTOSCOPY URETERAL STENT EXCHANGE performed by Audelia Tobisa MD at [address] Bilateral 10/6/2022    CYSTOSCOPY BILATERAL URETERAL STENT REMOVAL, RIGHT URETERAL [address] performed by Audelia Tobias MD at [facility] MAIN OR    GASTRIC BYPASS SURGERY  2014    gastric sleeve- [place]    HYSTERECTOMY (CERVIX STATUS UNKNOWN)      2018    HYSTERECTOMY (CERVIX STATUS UNKNOWN)  2020    TLH w/ Bilateral salpingectomy and left oophorectomy    IR PERC CATH PLEURAL DRAIN W/IMAG  2022    IR PERC CATH PLEURAL DRAIN W/IMAG 2022 [facility]    IR PORT PLACEMENT EQUAL OR GREATER THAN 5 YEARS  2/28/2022    IR PORT PLACEMENT EQUAL OR GREATER THAN 5 YEARS  2/28/2022    IR PORT PLACEMENT EQUAL OR GREATER THAN 5 YEARS 2/28/2022 [facility]    LAPAROTOMY N/A 8/17/2022    EXPLORATORY LAPAROTOMY/ ENTEROENTEROSTOMY performed by Marichuy Osorio MD at [facility]. [address]  age Adron Fat 21s\"    also \"unblocked her FT\"    TONSILLECTOMY      UPPER GASTROINTESTINAL ENDOSCOPY      with dilation    UPPER GASTROINTESTINAL ENDOSCOPY N/A 9/13/2022    EGD ESOPHAGOGASTRODUODENOSCOPY OFL 17 To IR after recovery for Para performed by Noelle Vizcaino MD at [facility] ENDOSCOPY    UPPER GASTROINTESTINAL ENDOSCOPY N/A 10/21/2022    EGD DILATION BALLOON performed by Vladimir Harding MD at [facility] ENDOSCOPY    UROLOGICAL SURGERY Bilateral 03/15/2022    cysto     Family History   Problem Relation Age of Onset    Heart Disease Maternal Grandmother     Hypertension Maternal Grandmother     Heart Disease Maternal Grandfather     Hypertension Maternal Grandfather     Pulmonary Embolism Neg Hx     Colon Cancer Neg Hx     Deep Vein Thrombosis Neg Hx     Ovarian Cancer Neg Hx     Prostate Cancer Neg Hx     Breast Cancer Neg Hx      Social History     Socioeconomic History    Marital status: Single     Spouse name: Not on file    Number of children: Not on file    Years of education: Not on file    Highest education level: Not on file   Occupational History    Not on file   Tobacco Use    Smoking status: Never    Smokeless tobacco: Never   Vaping Use    Vaping Use: Never used   Substance and Sexual Activity    Alcohol use: Not Currently    Drug use: No    Sexual activity: Not on file   Other Topics Concern    Not on file   Social History Narrative    1. Use large speculum. 2.  sister, Amy Rod #[id number] and mom is Damari Meeks #[id number] (both Kofoed's pts)  3.   PCP  Dr. Efra Beckwith ([facility], [place]), GI Dr. Delaney Molina normal EGD     Social Determinants of Health     Financial Resource Strain: Not on file   Food Insecurity: Not on file   Transportation Needs: Not on file   Physical Activity: Not on file   Stress: Not on file   Social Connections: Not on file   Intimate Partner Violence: Not on file   Housing Stability: Not on file     Current Outpatient Medications   Medication Sig Dispense Refill    pantoprazole (PROTONIX) 40 MG tablet Take 1 tablet by mouth in the morning and at bedtime 60 tablet 0    folic acid (FOLVITE) 1 MG tablet Take 1 tablet by mouth daily 30 tablet 0    lactulose (CHRONULAC) 10 GM/15ML solution Take 30 mLs by mouth 2 times daily as needed (constipation) (Patient not taking: No sig reported) 473 mL 0    metoclopramide (REGLAN) 5 MG tablet Take 1 tablet by mouth 3 times daily 90 tablet 0    vitamin D3 (CHOLECALCIFEROL) 10 MCG (400 UNIT) TABS tablet Take 2 tablets by mouth daily (Patient not taking: No sig reported) 60 tablet 0    sennosides-docusate sodium (SENOKOT-S) 8.6-50 MG tablet Take 2 tablets by mouth daily as needed for Constipation 90 tablet 0    sucralfate (CARAFATE) 1 GM tablet Take 1 tablet by mouth 4 times daily (Patient not taking: No sig reported) 120 tablet 3    potassium chloride (KLOR-CON M) 20 MEQ extended release tablet Take 1 tablet by mouth 2 times daily 60 tablet 1    lidocaine-prilocaine (EMLA) 2.5-2.5 % cream PRN  Apply to port about 45 minutes prior to access      ondansetron (ZOFRAN) 8 MG tablet Take 8 mg by mouth every 8 hours as needed       No current facility-administered medications for this visit.      Facility-Administered Medications Ordered in Other Visits   Medication Dose Route Frequency Provider Last Rate Last Admin    sodium chloride flush 0.9 % injection 5-40 mL  5-40 mL IntraVENous PRN Bessie Arriaza MD   10 mL at 11/10/22 0921    0.9 % sodium chloride infusion  5-250 mL/hr IntraVENous PRN Bessie Arriaza  mL/hr at 11/10/22 1056 150 mL/hr at 11/10/22 1056    ramucirumab (CYRAMZA) 386 mg in sodium chloride 0.9 % 250 mL chemo IVPB  8 mg/kg (Treatment Plan Recorded) IntraVENous Once Elvia Fuentes MD        ondansetron [facility]) injection 8 mg  8 mg IntraVENous Once Elvia Fuentes MD        famotidine (PEPCID) 20 mg in sodium chloride (PF) 0.9 % 10 mL injection  20 mg IntraVENous Once Elvia Fuentes MD        dexamethasone (DECADRON) injection 10 mg  10 mg IntraVENous Once Elvia Fuentes MD        PACLitaxel (TAXOL) 120 mg in sodium chloride 0.9 % 250 mL chemo infusion  80 mg/m2 (Treatment Plan Recorded) IntraVENous Once Elvia Fuentes MD        sodium chloride flush 0.9 % injection 5-40 mL  5-40 mL IntraVENous PRN Elvia Fuentes MD   10 mL at 11/10/22 1028       OBJECTIVE:  Wt Readings from Last 1 Encounters:   11/10/22 103 lb 8 oz (46.9 kg)     Temp Readings from Last 1 Encounters:   11/10/22 98.6 °F (37 °C)     BP Readings from Last 1 Encounters:   11/10/22 116/88     Pulse Readings from Last 1 Encounters:   11/10/22 98        Pain Score: Zero Tanyanya Gowers)    Physical Exam:  Constitutional: Well developed, well nourished female in no acute distress. HEENT: Normocephalic and atraumatic. Pupils are equal, round, and reactive to light. Extraocular muscles are intact. Sclerae anicteric. Neck supple without JVD. Lymph node   deferred   Skin Warm and dry. No bruising and no rash noted. No erythema. No pallor. Respiratory Unlabored respiratory effort. CVS Normotensive, normal rate   Abdomen Soft, nontender and nondistended. Neuro Grossly nonfocal with no obvious sensory or motor deficits. MSK Normal range of motion in general.  No edema and no tenderness. Psych Appropriate mood and affect.         Labs:  Recent Results (from the past 24 hour(s))   CBC with Auto Differential    Collection Time: 11/10/22  9:09 AM   Result Value Ref Range    WBC 5.8 4.3 - 11.1 K/uL    RBC 3.65 (L) 4.05 - 5.2 M/uL    Hemoglobin 9.6 (L) 11.7 - 15.4 g/dL    Hematocrit 32.5 (L) 35.8 - 46.3 %    MCV 89.0 82.0 - 102.0 FL    MCH 26.3 26.1 - 32.9 PG    MCHC 29.5 (L) 31.4 - 35.0 g/dL    RDW 19.5 (H) 11.9 - 14.6 %    Platelets 865 182 - 792 K/uL    MPV 10.0 9.4 - 12.3 FL    nRBC 0.00 0.0 - 0.2 K/uL    Seg Neutrophils 54 43 - 78 %    Lymphocytes 25 13 - 44 %    Monocytes 12 4.0 - 12.0 %    Eosinophils % 8 (H) 0.5 - 7.8 %    Basophils 1 0.0 - 2.0 %    Immature Granulocytes 0 0.0 - 5.0 %    Segs Absolute 3.2 1.7 - 8.2 K/UL    Absolute Lymph # 1.5 0.5 - 4.6 K/UL    Absolute Mono # 0.7 0.1 - 1.3 K/UL    Absolute Eos # 0.5 0.0 - 0.8 K/UL    Basophils Absolute 0.0 0.0 - 0.2 K/UL    Absolute Immature Granulocyte 0.0 0.0 - 0.5 K/UL    Differential Type AUTOMATED     Comprehensive Metabolic Panel    Collection Time: 11/10/22  9:09 AM   Result Value Ref Range    Sodium 135 133 - 143 mmol/L    Potassium 4.3 3.5 - 5.1 mmol/L    Chloride 104 101 - 110 mmol/L    CO2 29 21 - 32 mmol/L    Anion Gap 2 2 - 11 mmol/L    Glucose 101 (H) 65 - 100 mg/dL    BUN 24 (H) 6 - 23 MG/DL    Creatinine 0.30 (L) 0.6 - 1.0 MG/DL    Est, Glom Filt Rate >60 >60 ml/min/1.73m2    Calcium 8.6 8.3 - 10.4 MG/DL    Total Bilirubin 0.2 0.2 - 1.1 MG/DL    ALT 16 12 - 65 U/L    AST 16 15 - 37 U/L    Alk Phosphatase 82 50 - 136 U/L    Total Protein 6.5 6.3 - 8.2 g/dL    Albumin 2.5 (L) 3.5 - 5.0 g/dL    Globulin 4.0 2.8 - 4.5 g/dL    Albumin/Globulin Ratio 0.6 0.4 - 1.6     Protein, urine, random    Collection Time: 11/10/22  9:09 AM   Result Value Ref Range    Protein, Urine, Random 52 (H) <11.9 mg/dL   Magnesium    Collection Time: 11/10/22  9:09 AM   Result Value Ref Range    Magnesium 2.2 1.8 - 2.4 mg/dL   CEA    Collection Time: 11/10/22  9:09 AM   Result Value Ref Range    CEA 1.9 0.0 - 3.0 ng/mL       Imaging:  No results found for this or any previous visit. ASSESSMENT:   Diagnosis Orders   1. Weight loss        2. Carcinoma of unknown primary ([facility] [address]        3. Encounter for palliative care              PLAN:  Lab studies and imaging studies were personally reviewed.     Pertinent old records were reviewed from previous [facility] encounters     Case discussed with Dr. Sal Sanders and Evette Varela RN. Fatigue: due to malnutrition, weight loss. Hopefully as TPN recovers her deficit, her energy level will improve. Abdominal pain: denies  Nausea: Improved with initiation of TPN. Advanced Care Planning Discussed: Introduced the role of palliative care, including advance care planning. She does not have a HCPOA. Counseled that her parents are her legal NOK. Patient does not have contact with her father. She is aware of our availability to help her with this document if desired. Also dicussed availability of counseling, SW at the [facility]. Discussed Let There Be Mom for her to help her daughter cope. Overall, patient rather disengaged during my visit today. Will follow up in: 4 weeks or earlier if needed    All questions were asked and answered to the best of my ability. In all, I spent 40 minutes in the care of Ms. Angelica Hughes today, over 50% of which was in direct counseling and coordination of care. I have reviewed the patient's controlled substance prescription history, as maintained in the [facility] prescription monitoring program, so that the prescriptions(s) for a controlled substance can be given.   Last Date Reviewed: 11/10/22          SCOTT Molina CNP  Outpatient Palliative Care at the  [address]  Office : [phone]  Fax : [phone]

## 2022-11-10 NOTE — PROGRESS NOTES
Nutrition F/U:  Assessment:  Pt seen during infusion, starting cycle 2 of Cyramza + Taxol. Pt remains TPN dependent for 100% of estimated nutrition needs, infusing over 16 hrs/day starting this week, TPN is 2.3 L daily and providing ~1475 kcal and 85 gm protein. Pt is tolerating thin liquids po, chewing more solids and then spitting out for pleasure only. Pt has not drained her abdominal pleurx drain in over a week, feels abdominal ascites is less. Labs notable for BUN (24), Cr (0.3), Albumin (2.5) - trending up. Current BW: 103#, down 3# over the past 2 weeks - fluid loss most likely. Intervention:  1. Continue w/ po diet as tolerated for pleasure   2. TPN per [facility]    Monitoring/Evaluation:  1. RD to follow up during next office visit - follow up wt status, tolerance/intake of po diet, nutrition related lab values w/ TPN, symptom management.       3 [facility], [address], [address] [facility]

## 2022-11-10 NOTE — PROGRESS NOTES
HEMATOLOGY/ONCOLOGY FOLLOWUP  VISIT      Patient Name: Shayy Bedoya             Date of Visit: 11/10/2022  : 1975  Age:47 y.o. Presenting Complaint:  Shayy Bedoya  is seen in follow-up for carcinoma of unknown primary. History of Present Illness:  Ms. Desi Garcia was seen for the first time in our office in 2022. She was a woman of previously good health who began noticing left-sided back discomfort in early 2022. This was associated  ultimately with a sense of difficulty swallowing and ultimately she presented to [facility] for evaluation. Her symptoms were felt to potentially be indicative of a bowel obstruction and she was sent for a CT scan. This study, performed on 2022  was notable for a linear calcific density along the course of the proximal left ureter with associated moderate hydronephrosis potentially indicative of an intraureteral calculus. There was also stranding throughout the retroperitoneal soft tissues anterior  to the left psoas muscle with pelvic ascites. There was also wall thickening involving the descending colon. The question of colitis or serosal implants was raised. The patient had undergone a gastric sleeve placement several years prior. She had  also undergone a negative colonoscopy about 3 years prior to these presentations. There was a history of anemia ultimately resulting in the performance of a hysterectomy approximately 3 years ago for menorrhagia. Because of the CT scan findings, she  was ultimately referred to Dr. Clara Gandara for evaluation of a possible pelvic malignancy. On 2022 he performed a laparoscopy and biopsy with evidence of peritoneal carcinomatosis grossly. A \"peritoneal nodule\" and a \"peritoneal implant\"  were both positive for a poorly differentiated metastatic carcinoma potentially consistent with an upper gastrointestinal primary.   An omental biopsy showed no evidence of malignancy. Immunohistochemistries were positive for cytokeratin 7 and negative  for cytokeratin 20. The tumor was weakly positive for CDX2. The only other positive study was MOC-31. Testing through [facility] demonstrated no mutation and ERB-B2. There was no microsatellite instability and no mismatch repair protein deficiencies. There were no targetable lesions. A PET scan was subsequently performed on  showing elevated FDG uptake in the left pelvis corresponding with a masslike density concerning  for peritoneal carcinomatosis. There was a small volume of free fluid within the peritoneal cavity. There was moderate right hydroureteronephrosis. Despite the pathologic findings, there was no evidence of FDG activity in the upper gastrointestinal  tract. CA-125 was slightly elevated at 44. Given the uncertainty of her diagnosis, the patient went back for additional surgery on February 15 at which time she underwent bilateral ureteral stent placement and bilateral salpingo-oophorectomies. Pathology  from that surgery was notable for poorly differentiated carcinoma with occasional signet ring features. Immunohistochemical stains were most consistent with a tumor of the upper gastrointestinal tract. She is  1 para 1 abortus 0. There is no  family history of cancer. She has had no difficulties with vomiting. She still notes that some food traverses her esophagus slowly. She had undergone a prior dilatation without any evidence of cancer. She subsequently began treatment with FOLFOX ultimately  with the addition of bevacizumab in 2022. She ultimately received 8 cycles of FOLFOX most of them with Avastin. On , she was taken to surgery for evaluation of debulking and HIPEC. Unfortunately, at the time of surgery her tumor was found to be tightly adherent and impossible to debulk.   Despite the FOLFOX, she developed progressive ascites necessitating placement of a Pleurx catheter. Based on this symptomatic progression, a decision was made to place her on paclitaxel and ramucirumab. In October, she was hospitalized for recurrent nausea and vomiting. She was placed on intravenous fluids and seen by gastroenterology. She underwent an esophageal dilatation as part of her EGD. Most importantly however a decision was made to place her on TPN. She returns today for follow-up. Since last seen, she has been relatively stable. She is receiving TPN over 16 hours. She tolerates this well. Her lab data have been acceptable. She has episodic discomfort which she describes as being mild. She does not attempt to eat much at all. She has had no fever or night sweats. She continues to tolerate the paclitaxel and ramucirumab quite well. Her counts today to begin the next cycle have been excellent. There is no cough or shortness of breath. She notes she is draining less ascites on a day-to-day basis, frequently less than 200 to 300 cc. Her weight is down a bit but it is unclear whether this represents a lower volume of ascites or actual lean body weight loss.    Medications:   Current Outpatient Medications   Medication Sig Dispense Refill    pantoprazole (PROTONIX) 40 MG tablet Take 1 tablet by mouth in the morning and at bedtime 60 tablet 0    folic acid (FOLVITE) 1 MG tablet Take 1 tablet by mouth daily 30 tablet 0    metoclopramide (REGLAN) 5 MG tablet Take 1 tablet by mouth 3 times daily 90 tablet 0    sennosides-docusate sodium (SENOKOT-S) 8.6-50 MG tablet Take 2 tablets by mouth daily as needed for Constipation 90 tablet 0    potassium chloride (KLOR-CON M) 20 MEQ extended release tablet Take 1 tablet by mouth 2 times daily 60 tablet 1    lidocaine-prilocaine (EMLA) 2.5-2.5 % cream PRN  Apply to port about 45 minutes prior to access      ondansetron (ZOFRAN) 8 MG tablet Take 8 mg by mouth every 8 hours as needed      lactulose (CHRONULAC) 10 GM/15ML solution Take 30 mLs by mouth 2 times daily as needed (constipation) (Patient not taking: No sig reported) 473 mL 0    vitamin D3 (CHOLECALCIFEROL) 10 MCG (400 UNIT) TABS tablet Take 2 tablets by mouth daily (Patient not taking: No sig reported) 60 tablet 0    sucralfate (CARAFATE) 1 GM tablet Take 1 tablet by mouth 4 times daily (Patient not taking: No sig reported) 120 tablet 3     No current facility-administered medications for this visit. Facility-Administered Medications Ordered in Other Visits   Medication Dose Route Frequency Provider Last Rate Last Admin    sodium chloride flush 0.9 % injection 5-40 mL  5-40 mL IntraVENous PRN Lakia Saucedo MD   10 mL at 11/10/22 7798       Allergies:  No Known Allergies    Review of Systems: The Review of Systems is documented in full in the internal medical record. All systems are negative other than for those noted above. Past Medical History:  Documented in electronic medical record    Past Surgical History:  Documented in electronic medical record    Social History:  Documented in electronic medical record    Family History:  Documented in electronic medical record      Physical Examination:  General Appearance: Chronically ill appearing patient in no acute distress. Vital signs: /88 (Site: Left Upper Arm, Position: Sitting)   Pulse 98   Temp 98.6 °F (37 °C)   Resp 12   Ht 5' 4\" (1.626 m)   Wt 103 lb 8 oz (46.9 kg)   SpO2 100%   BMI 17.77 kg/m²     Performance status: ECOG Level:2  Distress  Screening Score: PHQ-9 Total Score: 0 (11/10/2022  9:40 AM)  Pain Score:   0 - No pain (Fatigue-6)    HEENT: No oral exam.  Neck: Supple. There is no thyromegaly. Lymph nodes: There is no cervical, supraclavicular, axillary or inguinal adenopathy. Breasts: Not examined. Lungs: The lungs are clear to auscultation and percussion. There is no egophony. There is no chest wall tenderness and no  use of accessory respiratory musculature. Heart:  There is no jugular venous distention. The rate is normal and rhythm regular. The S1 and S2 are normal and there are no murmurs or rubs. Abdomen: Soft, non-tender, distended, bowel sounds present and normal, no appreciated hepatosplenomegaly. No palpable masses. Abdominal Pleurx catheter in place. Skin: No rash, petechiae or ecchymoses. No evidence of malignancy. Extremities: No cyanosis, clubbing; trace lower extremity edema. Labs/Imaging:  Lab Results   Component Value Date/Time    WBC 5.8 11/10/2022 09:09 AM    HGB 9.6 11/10/2022 09:09 AM    HCT 32.5 11/10/2022 09:09 AM     11/10/2022 09:09 AM    MCV 89.0 11/10/2022 09:09 AM       Lab Results   Component Value Date/Time     11/10/2022 09:09 AM    K 4.3 11/10/2022 09:09 AM     11/10/2022 09:09 AM    CO2 29 11/10/2022 09:09 AM    BUN 24 11/10/2022 09:09 AM    GFRAA >60 09/27/2022 02:37 AM    GLOB 4.0 11/10/2022 09:09 AM    ALT 16 11/10/2022 09:09 AM       Above results reviewed with patient. ASSESSMENT:   Ms. Jessenia Villavicencio has an apparent metastatic carcinoma of unknown primary. Pathologically, the tumor seems to resemble a process arising in the upper gastrointestinal tract. However, any such primary is  not visible on EGD or PET scanning. Dr. Rachel Vo presumption is that this may have originated from the colon based on its location and behavior. That too would be somewhat inconsistent with the pathology findings. She has been treated with FOLFOX and Avastin. There was certainly no symptomatic response and in fact she developed progressive and symptomatic ascites suggesting disease progression. She is now on TPN and receiving paclitaxel and ramucirumab. She has been relatively stable in the interval since I last saw her and there are some indications that perhaps she is having a response, most notably her decreasing need for drainage of her abdomen. Protein calorie malnutrition-now on TPN.       PLAN:  Proceed with additional paclitaxel and ramucirumab. Continue home TPN through [facility]. Scan after 3 months of current systemic therapy.     RESUSCITATION DIRECTIVES/HOSPICE CARE;  Full Support        [facility]    Oncology and Hematology   [place]  [address] [place] [address]  P [phone]  F [phone]  [email or web address]

## 2022-11-10 NOTE — PROGRESS NOTES
Patient arrived to port lab for port access and lab draw. Port accessed with TPN infusing.   Port de-accessed and reaccessed and labs drawn per protocol   Port remains accessed   Patient discharged from port lab via wheelchair [place] Sun Protection      The [facility] of Dermat[facility] would like to remind you of the importance of sun protection all year round and particularly during the summer when the suns rays are the strongest. It has been proven that both acute and chronic sun exposure damages our cells and leads to skin cancer. Beyond skin cancer, the sun causes 90% of the symptoms of pre-mature skin aging, including wrinkles, lentigines (brown spots), and thin, easily bruised skin. Proper sun protection can help prevent these unwanted conditions.    Many patients report that the dont go in the sun. It has been shown that the average person receives 18 hours of incidental sun exposure per week during activities such as walking through parking lots, driving, or sitting next to windows. This accumulates to several bad sunburns per year!    In choosing sunscreen, you want one that protects against both UVA and UVB rays. It is recommended that you use one of SPF 30 or higher. It is important to apply the sunscreen about 20 minutes prior to sun exposure. Most sunscreens are chemical sunscreens and a reaction must take place in the skin so that they are effective. If they are applied and then you are immediately exposed to the sun or start sweating, this reaction has not had time to take place and you are therefore unprotected. Sunscreen needs to be reapplied every 2 hours if you are participating in water sports or sweating. We recommend Elta MD or Neutrogena Ultra Sheer Dry Touch SPF 55 for daily use; however there are many options and it is most important for you to find one that you will use on a consistent basis.    If you have sensitive skin, you may do best with a sunscreen that contains only physical blockers such as titanium dioxide or zinc oxide. These are typically thicker and harder to apply, however they afford very good protection. Neutrogena Sensitive Skin, Blue Lizard Sensitive Skin (pink top) or Neutrogena Pure  and Free are popular ones.     Aside from sunscreen, clothes with UV protection, wide brimmed hats, and sunglasses are other means of sun protection that we recommend.                        [facility] - DERMATOLOGY  [address]  [place] [address]  Dept: [phone]  Dept Fax: [phone]                                                                                 CRYOSURGERY      Your doctor has used a method called cryosurgery to treat your skin condition. Cryosurgery refers to the use of very cold substances to treat a variety of skin conditions such as warts, pre-skin cancers, molluscum contagiosum, sun spots, and several benign growths. The substance we use in cryosurgery is liquid nitrogen and is so cold (-195 degrees Celsius) that is burns when administered.     Following treatment in the office, the skin may immediately burn and become red. You may find the area around the lesion is affected as well. It is sometimes necessary to treat not only the lesion, but a small area of the surrounding normal skin to achieve a good response.     A blister, and even a blood filled blister, may form after treatment.   This is a normal response. If the blister is painful, it is acceptable to sterilize a needle and with rubbing alcohol and gently pop the blister. It is important that you gently wash the area with soap and warm water as the blister fluid may contain wart virus if a wart was treated. Do no remove the roof of the blister.     The area treated can take anywhere from 1-3 weeks to heal. Healing time depends on the kind skin lesion treated, the location, and how aggressively the lesion was treated. It is recommended that the areas treated are covered with Vaseline or bacitracin ointment and a band-aid. If a band-aid is not practical, just ointment applied several times per day will do. Keeping these areas moist will speed the healing time.    Treatment with liquid  nitrogen can leave a scar. In dark skin, it may be a light or dark scar, in light skin it may be a white or pink scar. These will generally fade with time, but may never go away completely.     If you have any concerns after your treatment, please feel free to call the office.       [address] [place] [address]/ [phone] FAX/ [email or web address]      SEBORRHEIC KERATOSES        What causes seborrheic keratoses?    Seborrheic keratoses are harmless, common skin growths that first appear during adult life.  As time goes by, more growths appear.  Some persons have a very large number of them.  Seborrheic keratoses appear on both covered and uncovered parts of the body; they are not caused by sunlight.  The tendency to develop seborrheic keratoses is inherited.    Seborrheic keratoses are harmless and never become malignant.  They begin as slightly raised, light brown spots.  Gradually they thicken and take on a rough wartlike surface.  They slowly darken and may turn black.  These color changes are harmless.  Seborrheic keratoses are superficial and look as if they were stuck on the skin.  Persons who have had several seborrheic keratoses can usually recognize this type of benign growth.  However, if you are concerned or unsure about any growth, consult me.    Treatment    Seborrheic keratoses can easily be removed in the office.  The only reason for removing a seborrheic keratosis is your wish to get rid of it.

## 2022-11-13 DIAGNOSIS — C80.1 METASTASIS TO PERITONEUM OF UNKNOWN PRIMARY (HCC): Primary | ICD-10-CM

## 2022-11-13 DIAGNOSIS — C78.6 METASTASIS TO PERITONEUM OF UNKNOWN PRIMARY (HCC): Primary | ICD-10-CM

## 2022-11-17 ENCOUNTER — HOSPITAL ENCOUNTER (OUTPATIENT)
Dept: INFUSION THERAPY | Age: 47
Discharge: HOME | End: 2022-11-17
Payer: COMMERCIAL

## 2022-11-17 VITALS
WEIGHT: 104.4 LBS | BODY MASS INDEX: 17.92 KG/M2 | TEMPERATURE: 98.9 F | SYSTOLIC BLOOD PRESSURE: 127 MMHG | DIASTOLIC BLOOD PRESSURE: 86 MMHG | HEART RATE: 101 BPM | RESPIRATION RATE: 18 BRPM | OXYGEN SATURATION: 98 %

## 2022-11-17 DIAGNOSIS — C80.1 CARCINOMA OF UNKNOWN PRIMARY (HCC): ICD-10-CM

## 2022-11-17 DIAGNOSIS — C78.6 METASTASIS TO PERITONEUM OF UNKNOWN PRIMARY (HCC): Primary | ICD-10-CM

## 2022-11-17 DIAGNOSIS — C80.1 METASTASIS TO PERITONEUM OF UNKNOWN PRIMARY (HCC): Primary | ICD-10-CM

## 2022-11-17 PROCEDURE — 2500000003 HC RX 250 WO HCPCS: Performed by: INTERNAL MEDICINE

## 2022-11-17 PROCEDURE — 6360000002 HC RX W HCPCS: Performed by: INTERNAL MEDICINE

## 2022-11-17 PROCEDURE — A4216 STERILE WATER/SALINE, 10 ML: HCPCS | Performed by: INTERNAL MEDICINE

## 2022-11-17 PROCEDURE — 2580000003 HC RX 258: Performed by: INTERNAL MEDICINE

## 2022-11-17 PROCEDURE — 96375 TX/PRO/DX INJ NEW DRUG ADDON: CPT

## 2022-11-17 PROCEDURE — 96413 CHEMO IV INFUSION 1 HR: CPT

## 2022-11-17 RX ORDER — DEXAMETHASONE SODIUM PHOSPHATE 10 MG/ML
10 INJECTION INTRAMUSCULAR; INTRAVENOUS ONCE
Status: COMPLETED | OUTPATIENT
Start: 2022-11-17 | End: 2022-11-17

## 2022-11-17 RX ORDER — ONDANSETRON 2 MG/ML
8 INJECTION INTRAMUSCULAR; INTRAVENOUS ONCE
Status: COMPLETED | OUTPATIENT
Start: 2022-11-17 | End: 2022-11-17

## 2022-11-17 RX ORDER — ACETAMINOPHEN 325 MG/1
650 TABLET ORAL
Status: DISCONTINUED | OUTPATIENT
Start: 2022-11-17 | End: 2022-11-18 | Stop reason: HOSPADM

## 2022-11-17 RX ORDER — SODIUM CHLORIDE 9 MG/ML
5-250 INJECTION, SOLUTION INTRAVENOUS PRN
Status: DISCONTINUED | OUTPATIENT
Start: 2022-11-17 | End: 2022-11-18 | Stop reason: HOSPADM

## 2022-11-17 RX ORDER — ALBUTEROL SULFATE 90 UG/1
4 AEROSOL, METERED RESPIRATORY (INHALATION) PRN
Status: DISCONTINUED | OUTPATIENT
Start: 2022-11-17 | End: 2022-11-18 | Stop reason: HOSPADM

## 2022-11-17 RX ORDER — EPINEPHRINE 1 MG/ML
0.3 INJECTION, SOLUTION, CONCENTRATE INTRAVENOUS PRN
Status: DISCONTINUED | OUTPATIENT
Start: 2022-11-17 | End: 2022-11-18 | Stop reason: HOSPADM

## 2022-11-17 RX ORDER — MEPERIDINE HYDROCHLORIDE 25 MG/ML
12.5 INJECTION INTRAMUSCULAR; INTRAVENOUS; SUBCUTANEOUS PRN
Status: DISCONTINUED | OUTPATIENT
Start: 2022-11-17 | End: 2022-11-18 | Stop reason: HOSPADM

## 2022-11-17 RX ORDER — DIPHENHYDRAMINE HYDROCHLORIDE 50 MG/ML
50 INJECTION INTRAMUSCULAR; INTRAVENOUS ONCE
Status: COMPLETED | OUTPATIENT
Start: 2022-11-17 | End: 2022-11-17

## 2022-11-17 RX ORDER — HEPARIN SODIUM (PORCINE) LOCK FLUSH IV SOLN 100 UNIT/ML 100 UNIT/ML
500 SOLUTION INTRAVENOUS PRN
Status: DISCONTINUED | OUTPATIENT
Start: 2022-11-17 | End: 2022-11-18 | Stop reason: HOSPADM

## 2022-11-17 RX ORDER — DIPHENHYDRAMINE HYDROCHLORIDE 50 MG/ML
50 INJECTION INTRAMUSCULAR; INTRAVENOUS
Status: DISCONTINUED | OUTPATIENT
Start: 2022-11-17 | End: 2022-11-18 | Stop reason: HOSPADM

## 2022-11-17 RX ORDER — SODIUM CHLORIDE 0.9 % (FLUSH) 0.9 %
5-40 SYRINGE (ML) INJECTION PRN
Status: DISCONTINUED | OUTPATIENT
Start: 2022-11-17 | End: 2022-11-18 | Stop reason: HOSPADM

## 2022-11-17 RX ORDER — ONDANSETRON 2 MG/ML
8 INJECTION INTRAMUSCULAR; INTRAVENOUS
Status: DISCONTINUED | OUTPATIENT
Start: 2022-11-17 | End: 2022-11-18 | Stop reason: HOSPADM

## 2022-11-17 RX ADMIN — SODIUM CHLORIDE 25 ML/HR: 9 INJECTION, SOLUTION INTRAVENOUS at 10:26

## 2022-11-17 RX ADMIN — PACLITAXEL 120 MG: 6 INJECTION, SOLUTION, CONCENTRATE INTRAVENOUS at 11:36

## 2022-11-17 RX ADMIN — FAMOTIDINE 20 MG: 10 INJECTION, SOLUTION INTRAVENOUS at 11:07

## 2022-11-17 RX ADMIN — DIPHENHYDRAMINE HYDROCHLORIDE 50 MG: 50 INJECTION, SOLUTION INTRAMUSCULAR; INTRAVENOUS at 11:08

## 2022-11-17 RX ADMIN — ONDANSETRON 8 MG: 2 INJECTION INTRAMUSCULAR; INTRAVENOUS at 11:04

## 2022-11-17 RX ADMIN — DEXAMETHASONE SODIUM PHOSPHATE 10 MG: 10 INJECTION INTRAMUSCULAR; INTRAVENOUS at 11:11

## 2022-11-17 RX ADMIN — SODIUM CHLORIDE, PRESERVATIVE FREE 10 ML: 5 INJECTION INTRAVENOUS at 10:25

## 2022-11-17 NOTE — PROGRESS NOTES
Arrived to the [facility]. Taxol completed. Patient tolerated well. Any issues or concerns during appointment: none. Patient aware of next infusion appointment on 11/25/22 (date) at 8 AM (time). Patient instructed to call provider with temperature of 100.4 or greater or nausea/vomiting/diarrhea or pain not controlled by medications  Discharged ambulatory.

## 2022-11-25 ENCOUNTER — OFFICE VISIT (OUTPATIENT)
Dept: ONCOLOGY | Age: 47
End: 2022-11-25
Payer: COMMERCIAL

## 2022-11-25 ENCOUNTER — HOSPITAL ENCOUNTER (OUTPATIENT)
Dept: INFUSION THERAPY | Age: 47
Discharge: HOME | End: 2022-11-25
Payer: COMMERCIAL

## 2022-11-25 VITALS
RESPIRATION RATE: 14 BRPM | OXYGEN SATURATION: 100 % | WEIGHT: 105 LBS | DIASTOLIC BLOOD PRESSURE: 88 MMHG | HEART RATE: 110 BPM | HEIGHT: 64 IN | TEMPERATURE: 98.7 F | SYSTOLIC BLOOD PRESSURE: 131 MMHG | BODY MASS INDEX: 17.93 KG/M2

## 2022-11-25 DIAGNOSIS — G62.0 CHEMOTHERAPY-INDUCED NEUROPATHY (HCC): ICD-10-CM

## 2022-11-25 DIAGNOSIS — Z78.9 ON TOTAL PARENTERAL NUTRITION (TPN): ICD-10-CM

## 2022-11-25 DIAGNOSIS — C80.1 CARCINOMA OF UNKNOWN PRIMARY (HCC): ICD-10-CM

## 2022-11-25 DIAGNOSIS — L85.3 DRY SKIN: ICD-10-CM

## 2022-11-25 DIAGNOSIS — T45.1X5A CHEMOTHERAPY-INDUCED NEUROPATHY (HCC): ICD-10-CM

## 2022-11-25 DIAGNOSIS — C78.6 METASTASIS TO PERITONEUM OF UNKNOWN PRIMARY (HCC): Primary | ICD-10-CM

## 2022-11-25 DIAGNOSIS — C80.1 METASTASIS TO PERITONEUM OF UNKNOWN PRIMARY (HCC): Primary | ICD-10-CM

## 2022-11-25 DIAGNOSIS — R53.83 FATIGUE DUE TO TREATMENT: ICD-10-CM

## 2022-11-25 LAB
ALBUMIN SERPL-MCNC: 2.5 G/DL (ref 3.5–5)
ALBUMIN/GLOB SERPL: 0.7 {RATIO} (ref 0.4–1.6)
ALP SERPL-CCNC: 65 U/L (ref 50–136)
ALT SERPL-CCNC: 14 U/L (ref 12–65)
ANION GAP SERPL CALC-SCNC: 5 MMOL/L (ref 2–11)
APPEARANCE UR: ABNORMAL
AST SERPL-CCNC: 14 U/L (ref 15–37)
BACTERIA URNS QL MICRO: ABNORMAL /HPF
BASOPHILS # BLD: 0 K/UL (ref 0–0.2)
BASOPHILS NFR BLD: 0 % (ref 0–2)
BILIRUB SERPL-MCNC: 0.3 MG/DL (ref 0.2–1.1)
BILIRUB UR QL: NEGATIVE
BUN SERPL-MCNC: 22 MG/DL (ref 6–23)
CALCIUM SERPL-MCNC: 8.5 MG/DL (ref 8.3–10.4)
CASTS URNS QL MICRO: ABNORMAL /LPF
CEA SERPL-MCNC: 2.9 NG/ML (ref 0–3)
CHLORIDE SERPL-SCNC: 105 MMOL/L (ref 101–110)
CO2 SERPL-SCNC: 27 MMOL/L (ref 21–32)
COLOR UR: ABNORMAL
CREAT SERPL-MCNC: 0.3 MG/DL (ref 0.6–1)
CRYSTALS URNS QL MICRO: 0 /LPF
DIFFERENTIAL METHOD BLD: ABNORMAL
EOSINOPHIL # BLD: 0.2 K/UL (ref 0–0.8)
EOSINOPHIL NFR BLD: 5 % (ref 0.5–7.8)
EPI CELLS #/AREA URNS HPF: ABNORMAL /HPF
ERYTHROCYTE [DISTWIDTH] IN BLOOD BY AUTOMATED COUNT: 19.3 % (ref 11.9–14.6)
GLOBULIN SER CALC-MCNC: 3.8 G/DL (ref 2.8–4.5)
GLUCOSE SERPL-MCNC: 103 MG/DL (ref 65–100)
GLUCOSE UR STRIP.AUTO-MCNC: NEGATIVE MG/DL
HCT VFR BLD AUTO: 29.1 % (ref 35.8–46.3)
HGB BLD-MCNC: 9 G/DL (ref 11.7–15.4)
HGB UR QL STRIP: ABNORMAL
IMM GRANULOCYTES # BLD AUTO: 0 K/UL (ref 0–0.5)
IMM GRANULOCYTES NFR BLD AUTO: 1 % (ref 0–5)
KETONES UR QL STRIP.AUTO: NEGATIVE MG/DL
LEUKOCYTE ESTERASE UR QL STRIP.AUTO: ABNORMAL
LYMPHOCYTES # BLD: 1.7 K/UL (ref 0.5–4.6)
LYMPHOCYTES NFR BLD: 35 % (ref 13–44)
MAGNESIUM SERPL-MCNC: 2.1 MG/DL (ref 1.8–2.4)
MCH RBC QN AUTO: 27.4 PG (ref 26.1–32.9)
MCHC RBC AUTO-ENTMCNC: 30.9 G/DL (ref 31.4–35)
MCV RBC AUTO: 88.7 FL (ref 82–102)
MONOCYTES # BLD: 0.4 K/UL (ref 0.1–1.3)
MONOCYTES NFR BLD: 9 % (ref 4–12)
MUCOUS THREADS URNS QL MICRO: ABNORMAL /LPF
NEUTS SEG # BLD: 2.4 K/UL (ref 1.7–8.2)
NEUTS SEG NFR BLD: 50 % (ref 43–78)
NITRITE UR QL STRIP.AUTO: NEGATIVE
NRBC # BLD: 0.05 K/UL (ref 0–0.2)
PH UR STRIP: 7.5 [PH] (ref 5–9)
PLATELET # BLD AUTO: 324 K/UL (ref 150–450)
PMV BLD AUTO: 9.6 FL (ref 9.4–12.3)
POTASSIUM SERPL-SCNC: 4.2 MMOL/L (ref 3.5–5.1)
PROT SERPL-MCNC: 6.3 G/DL (ref 6.3–8.2)
PROT UR STRIP-MCNC: 100 MG/DL
RBC # BLD AUTO: 3.28 M/UL (ref 4.05–5.2)
RBC #/AREA URNS HPF: >100 /HPF
SODIUM SERPL-SCNC: 137 MMOL/L (ref 133–143)
SP GR UR REFRACTOMETRY: 1.02 (ref 1–1.02)
UROBILINOGEN UR QL STRIP.AUTO: 0.2 EU/DL (ref 0.2–1)
WBC # BLD AUTO: 4.8 K/UL (ref 4.3–11.1)
WBC URNS QL MICRO: ABNORMAL /HPF
YEAST URNS QL MICRO: ABNORMAL

## 2022-11-25 PROCEDURE — 85025 COMPLETE CBC W/AUTO DIFF WBC: CPT

## 2022-11-25 PROCEDURE — 96375 TX/PRO/DX INJ NEW DRUG ADDON: CPT

## 2022-11-25 PROCEDURE — 36591 DRAW BLOOD OFF VENOUS DEVICE: CPT

## 2022-11-25 PROCEDURE — 2500000003 HC RX 250 WO HCPCS: Performed by: NURSE PRACTITIONER

## 2022-11-25 PROCEDURE — A4216 STERILE WATER/SALINE, 10 ML: HCPCS | Performed by: NURSE PRACTITIONER

## 2022-11-25 PROCEDURE — 96413 CHEMO IV INFUSION 1 HR: CPT

## 2022-11-25 PROCEDURE — 83735 ASSAY OF MAGNESIUM: CPT

## 2022-11-25 PROCEDURE — 80053 COMPREHEN METABOLIC PANEL: CPT

## 2022-11-25 PROCEDURE — 6360000002 HC RX W HCPCS: Performed by: NURSE PRACTITIONER

## 2022-11-25 PROCEDURE — 2580000003 HC RX 258: Performed by: INTERNAL MEDICINE

## 2022-11-25 PROCEDURE — 96417 CHEMO IV INFUS EACH ADDL SEQ: CPT

## 2022-11-25 PROCEDURE — 99214 OFFICE O/P EST MOD 30 MIN: CPT | Performed by: NURSE PRACTITIONER

## 2022-11-25 PROCEDURE — 82378 CARCINOEMBRYONIC ANTIGEN: CPT

## 2022-11-25 PROCEDURE — 81001 URINALYSIS AUTO W/SCOPE: CPT

## 2022-11-25 PROCEDURE — 2580000003 HC RX 258: Performed by: NURSE PRACTITIONER

## 2022-11-25 RX ORDER — SODIUM CHLORIDE 9 MG/ML
5-250 INJECTION, SOLUTION INTRAVENOUS PRN
Status: CANCELLED | OUTPATIENT
Start: 2022-11-25

## 2022-11-25 RX ORDER — DIPHENHYDRAMINE HYDROCHLORIDE 50 MG/ML
50 INJECTION INTRAMUSCULAR; INTRAVENOUS ONCE
Status: COMPLETED | OUTPATIENT
Start: 2022-11-25 | End: 2022-11-25

## 2022-11-25 RX ORDER — SODIUM CHLORIDE 0.9 % (FLUSH) 0.9 %
5-40 SYRINGE (ML) INJECTION PRN
Status: DISCONTINUED | OUTPATIENT
Start: 2022-11-25 | End: 2022-11-26 | Stop reason: HOSPADM

## 2022-11-25 RX ORDER — FAMOTIDINE 10 MG/ML
20 INJECTION, SOLUTION INTRAVENOUS ONCE
Status: CANCELLED | OUTPATIENT
Start: 2022-11-25 | End: 2022-11-25

## 2022-11-25 RX ORDER — FAMOTIDINE 10 MG/ML
20 INJECTION, SOLUTION INTRAVENOUS
Status: CANCELLED | OUTPATIENT
Start: 2022-11-25

## 2022-11-25 RX ORDER — SODIUM CHLORIDE 9 MG/ML
5-40 INJECTION INTRAVENOUS PRN
Status: DISCONTINUED | OUTPATIENT
Start: 2022-11-25 | End: 2022-11-26 | Stop reason: HOSPADM

## 2022-11-25 RX ORDER — MEPERIDINE HYDROCHLORIDE 50 MG/ML
12.5 INJECTION INTRAMUSCULAR; INTRAVENOUS; SUBCUTANEOUS PRN
Status: CANCELLED | OUTPATIENT
Start: 2022-11-25

## 2022-11-25 RX ORDER — SODIUM CHLORIDE 9 MG/ML
5-250 INJECTION, SOLUTION INTRAVENOUS PRN
Status: DISCONTINUED | OUTPATIENT
Start: 2022-11-25 | End: 2022-11-26 | Stop reason: HOSPADM

## 2022-11-25 RX ORDER — DEXAMETHASONE SODIUM PHOSPHATE 10 MG/ML
10 INJECTION INTRAMUSCULAR; INTRAVENOUS ONCE
Status: COMPLETED | OUTPATIENT
Start: 2022-11-25 | End: 2022-11-25

## 2022-11-25 RX ORDER — SODIUM CHLORIDE 0.9 % (FLUSH) 0.9 %
5-40 SYRINGE (ML) INJECTION PRN
Status: CANCELLED | OUTPATIENT
Start: 2022-11-25

## 2022-11-25 RX ORDER — ACETAMINOPHEN 325 MG/1
650 TABLET ORAL
Status: CANCELLED | OUTPATIENT
Start: 2022-11-25

## 2022-11-25 RX ORDER — DIPHENHYDRAMINE HYDROCHLORIDE 50 MG/ML
50 INJECTION INTRAMUSCULAR; INTRAVENOUS
Status: CANCELLED | OUTPATIENT
Start: 2022-11-25

## 2022-11-25 RX ORDER — HEPARIN SODIUM (PORCINE) LOCK FLUSH IV SOLN 100 UNIT/ML 100 UNIT/ML
500 SOLUTION INTRAVENOUS PRN
Status: CANCELLED | OUTPATIENT
Start: 2022-11-25

## 2022-11-25 RX ORDER — DIPHENHYDRAMINE HYDROCHLORIDE 50 MG/ML
50 INJECTION INTRAMUSCULAR; INTRAVENOUS ONCE
Status: CANCELLED | OUTPATIENT
Start: 2022-11-25 | End: 2022-11-25

## 2022-11-25 RX ORDER — ONDANSETRON 2 MG/ML
8 INJECTION INTRAMUSCULAR; INTRAVENOUS ONCE
Status: CANCELLED | OUTPATIENT
Start: 2022-11-25 | End: 2022-11-25

## 2022-11-25 RX ORDER — ONDANSETRON 2 MG/ML
8 INJECTION INTRAMUSCULAR; INTRAVENOUS
Status: CANCELLED | OUTPATIENT
Start: 2022-11-25

## 2022-11-25 RX ORDER — EPINEPHRINE 1 MG/ML
0.3 INJECTION, SOLUTION, CONCENTRATE INTRAVENOUS PRN
Status: CANCELLED | OUTPATIENT
Start: 2022-11-25

## 2022-11-25 RX ORDER — ALBUTEROL SULFATE 90 UG/1
4 AEROSOL, METERED RESPIRATORY (INHALATION) PRN
Status: CANCELLED | OUTPATIENT
Start: 2022-11-25

## 2022-11-25 RX ORDER — SODIUM CHLORIDE 0.9 % (FLUSH) 0.9 %
10 SYRINGE (ML) INJECTION PRN
Status: DISCONTINUED | OUTPATIENT
Start: 2022-11-25 | End: 2022-11-26 | Stop reason: HOSPADM

## 2022-11-25 RX ORDER — ONDANSETRON 2 MG/ML
8 INJECTION INTRAMUSCULAR; INTRAVENOUS ONCE
Status: COMPLETED | OUTPATIENT
Start: 2022-11-25 | End: 2022-11-25

## 2022-11-25 RX ORDER — SODIUM CHLORIDE 9 MG/ML
INJECTION, SOLUTION INTRAVENOUS CONTINUOUS
Status: CANCELLED | OUTPATIENT
Start: 2022-11-25

## 2022-11-25 RX ORDER — SODIUM CHLORIDE 9 MG/ML
5-40 INJECTION INTRAVENOUS PRN
Status: CANCELLED | OUTPATIENT
Start: 2022-11-25

## 2022-11-25 RX ADMIN — DEXAMETHASONE SODIUM PHOSPHATE 10 MG: 10 INJECTION INTRAMUSCULAR; INTRAVENOUS at 13:08

## 2022-11-25 RX ADMIN — PACLITAXEL 120 MG: 6 INJECTION, SOLUTION, CONCENTRATE INTRAVENOUS at 13:32

## 2022-11-25 RX ADMIN — SODIUM CHLORIDE 386 MG: 9 INJECTION, SOLUTION INTRAVENOUS at 11:59

## 2022-11-25 RX ADMIN — ONDANSETRON 8 MG: 2 INJECTION INTRAMUSCULAR; INTRAVENOUS at 13:04

## 2022-11-25 RX ADMIN — DIPHENHYDRAMINE HYDROCHLORIDE 50 MG: 50 INJECTION, SOLUTION INTRAMUSCULAR; INTRAVENOUS at 11:25

## 2022-11-25 RX ADMIN — FAMOTIDINE 20 MG: 10 INJECTION, SOLUTION INTRAVENOUS at 13:05

## 2022-11-25 RX ADMIN — SODIUM CHLORIDE 25 ML/HR: 9 INJECTION, SOLUTION INTRAVENOUS at 11:25

## 2022-11-25 RX ADMIN — SODIUM CHLORIDE, PRESERVATIVE FREE 10 ML: 5 INJECTION INTRAVENOUS at 14:36

## 2022-11-25 RX ADMIN — SODIUM CHLORIDE, PRESERVATIVE FREE 10 ML: 5 INJECTION INTRAVENOUS at 10:05

## 2022-11-25 ASSESSMENT — PATIENT HEALTH QUESTIONNAIRE - PHQ9
SUM OF ALL RESPONSES TO PHQ QUESTIONS 1-9: 0
2. FEELING DOWN, DEPRESSED OR HOPELESS: 0
SUM OF ALL RESPONSES TO PHQ QUESTIONS 1-9: 0
2. FEELING DOWN, DEPRESSED OR HOPELESS: 0

## 2022-11-25 NOTE — PROGRESS NOTES
Patient arrived to port lab for port access and lab draw and urine   Port accessed and labs drawn per protocol   Port remains accessed  Patient discharged from port lab via wheelchair w/family

## 2022-11-25 NOTE — PATIENT INSTRUCTIONS
Patient Instructions from Today's Visit    Reason for Visit:  Prechemo visit    Diagnosis Information:  [email or web address]    Plan:  -Taxol/Cyramza today  -We will schedule CT prior to 12-22 appt usha Velarde  -Continue TPN    Follow Up:  As scheduled    Recent Lab Results:  Hospital Outpatient Visit on 11/25/2022   Component Date Value Ref Range Status    WBC 11/25/2022 4.8  4.3 - 11.1 K/uL Final    RBC 11/25/2022 3.28 (A)  4.05 - 5.2 M/uL Final    Hemoglobin 11/25/2022 9.0 (A)  11.7 - 15.4 g/dL Final    Hematocrit 11/25/2022 29.1 (A)  35.8 - 46.3 % Final    MCV 11/25/2022 88.7  82.0 - 102.0 FL Final    MCH 11/25/2022 27.4  26.1 - 32.9 PG Final    MCHC 11/25/2022 30.9 (A)  31.4 - 35.0 g/dL Final    RDW 11/25/2022 19.3 (A)  11.9 - 14.6 % Final    Platelets 33/25/1217 324  150 - 450 K/uL Final    MPV 11/25/2022 9.6  9.4 - 12.3 FL Final    nRBC 11/25/2022 0.05  0.0 - 0.2 K/uL Final    **Note: Absolute NRBC parameter is now reported with Hemogram**    Seg Neutrophils 11/25/2022 50  43 - 78 % Final    Lymphocytes 11/25/2022 35  13 - 44 % Final    Monocytes 11/25/2022 9  4.0 - 12.0 % Final    Eosinophils % 11/25/2022 5  0.5 - 7.8 % Final    Basophils 11/25/2022 0  0.0 - 2.0 % Final    Immature Granulocytes 11/25/2022 1  0.0 - 5.0 % Final    Segs Absolute 11/25/2022 2.4  1.7 - 8.2 K/UL Final    Absolute Lymph # 11/25/2022 1.7  0.5 - 4.6 K/UL Final    Absolute Mono # 11/25/2022 0.4  0.1 - 1.3 K/UL Final    Absolute Eos # 11/25/2022 0.2  0.0 - 0.8 K/UL Final    Basophils Absolute 11/25/2022 0.0  0.0 - 0.2 K/UL Final    Absolute Immature Granulocyte 11/25/2022 0.0  0.0 - 0.5 K/UL Final    Differential Type 11/25/2022 AUTOMATED    Final    Color, UA 11/25/2022 RED    Final    Appearance 11/25/2022 CLOUDY    Final    Specific Gravity, UA 11/25/2022 1.025 (A)  1.001 - 1.023   Final    pH, Urine 11/25/2022 7.5  5.0 - 9.0   Final    Protein, UA 11/25/2022 100 (A)  NEG mg/dL Final    Glucose, UA 11/25/2022 Negative  NEG mg/dL Final    Ketones, Urine 11/25/2022 Negative  NEG mg/dL Final    Bilirubin Urine 11/25/2022 Negative  NEG   Final    Blood, Urine 11/25/2022 LARGE (A)  NEG   Final    Urobilinogen, Urine 11/25/2022 0.2  0.2 - 1.0 EU/dL Final    Nitrite, Urine 11/25/2022 Negative  NEG   Final    Leukocyte Esterase, Urine 11/25/2022 MODERATE (A)  NEG   Final    WBC, UA 11/25/2022 10-20  0 /hpf Final    RBC, UA 11/25/2022 >100  0 /hpf Final    Epithelial Cells UA 11/25/2022 0-3  0 /hpf Final    BACTERIA, URINE 11/25/2022 1+ (A)  0 /hpf Final    Casts 11/25/2022 0-3  0 /lpf Final    Comment: HYALINE  0-3  GRANULAR      Crystals 11/25/2022 0  0 /LPF Final    Mucus, UA 11/25/2022 TRACE  0 /lpf Final    Yeast, UA 11/25/2022 OCCASIONAL    Final        Treatment Summary has been discussed and given to patient: n/a        -------------------------------------------------------------------------------------------------------------------  Please call our office at [phone] if you have any  of the following symptoms:   Fever of 100.5 or greater  Chills  Shortness of breath  Swelling or pain in one leg    After office hours an answering service is available and will contact a provider for emergencies or if you are experiencing any of the above symptoms. Patient does express an interest in My Chart. My Chart log in information explained on the after visit summary printout at the [facility] desk.     TRI Oliveira RN, BSN  Nurse Navigator  [phone] [email or web address]

## 2022-11-25 NOTE — PROGRESS NOTES
[facility] Cyramza and Taxol completed. Patient tolerated without problems. Any issues or concerns during appointment: no.  Patient aware of next infusion appointment on 12/8/22 (date) at 0930   Patient instructed to call provider with temperature of 100.4 or greater or nausea/vomiting/ diarrhea or pain not controlled by medications  Discharged via [facility] with family.

## 2022-11-25 NOTE — PROGRESS NOTES
HEMATOLOGY/ONCOLOGY FOLLOWUP  VISIT      Patient Name: Sherie Sahu             Date of Visit: 2022  : 1975  Age:47 y.o. Presenting Complaint:  Sherie Sahu  is seen in follow-up for carcinoma of unknown primary. History of Present Illness:  Ms. Reina Bee was seen for the first time in our office in 2022. She was a woman of previously good health who began noticing left-sided back discomfort in early 2022. This was associated  ultimately with a sense of difficulty swallowing and ultimately she presented to [facility] for evaluation. Her symptoms were felt to potentially be indicative of a bowel obstruction and she was sent for a CT scan. This study, performed on 2022  was notable for a linear calcific density along the course of the proximal left ureter with associated moderate hydronephrosis potentially indicative of an intraureteral calculus. There was also stranding throughout the retroperitoneal soft tissues anterior  to the left psoas muscle with pelvic ascites. There was also wall thickening involving the descending colon. The question of colitis or serosal implants was raised. The patient had undergone a gastric sleeve placement several years prior. She had  also undergone a negative colonoscopy about 3 years prior to these presentations. There was a history of anemia ultimately resulting in the performance of a hysterectomy approximately 3 years ago for menorrhagia. Because of the CT scan findings, she  was ultimately referred to Dr. Lupe Alexandre for evaluation of a possible pelvic malignancy. On 2022 he performed a laparoscopy and biopsy with evidence of peritoneal carcinomatosis grossly. A \"peritoneal nodule\" and a \"peritoneal implant\"  were both positive for a poorly differentiated metastatic carcinoma potentially consistent with an upper gastrointestinal primary.   An omental biopsy showed no evidence of malignancy. Immunohistochemistries were positive for cytokeratin 7 and negative  for cytokeratin 20. The tumor was weakly positive for CDX2. The only other positive study was MOC-31. Testing through [employer] demonstrated no mutation and ERB-B2. There was no microsatellite instability and no mismatch repair protein deficiencies. There were no targetable lesions. A PET scan was subsequently performed on  showing elevated FDG uptake in the left pelvis corresponding with a masslike density concerning  for peritoneal carcinomatosis. There was a small volume of free fluid within the peritoneal cavity. There was moderate right hydroureteronephrosis. Despite the pathologic findings, there was no evidence of FDG activity in the upper gastrointestinal  tract. CA-125 was slightly elevated at 44. Given the uncertainty of her diagnosis, the patient went back for additional surgery on February 15 at which time she underwent bilateral ureteral stent placement and bilateral salpingo-oophorectomies. Pathology  from that surgery was notable for poorly differentiated carcinoma with occasional signet ring features. Immunohistochemical stains were most consistent with a tumor of the upper gastrointestinal tract. She is  1 para 1 abortus 0. There is no  family history of cancer. She has had no difficulties with vomiting. She still notes that some food traverses her esophagus slowly. She had undergone a prior dilatation without any evidence of cancer. She subsequently began treatment with FOLFOX ultimately  with the addition of bevacizumab in 2022. She ultimately received 8 cycles of FOLFOX most of them with Avastin. On , she was taken to surgery for evaluation of debulking and HIPEC. Unfortunately, at the time of surgery her tumor was found to be tightly adherent and impossible to debulk.   Despite the FOLFOX, she developed progressive ascites necessitating placement of a Pleurx catheter. Based on this symptomatic progression, a decision was made to place her on paclitaxel and ramucirumab. In October, she was hospitalized for recurrent nausea and vomiting. She was placed on intravenous fluids and seen by gastroenterology. She underwent an esophageal dilatation as part of her EGD. Most importantly however a decision was made to place her on TPN. She returns today for follow-up and C2D15 Cyramza/Taxol. Overall, she is doing OK since last seen. There is no nausea and bowels are stable with loose stools, but she does not describe at diarrhea. TPN is ongoing and she will chew foods for pleasure and then spit out, weight is stable. Fatigue is ongoing. There is no cough, shortness of breath, or edema. Neuropathy is stable. She denies any pain. Dry skin is her biggest complaint-encouraged regular use of thick lotions/creams. She reports draining only ~300ml weekly from her abdominal pleurX. Denies any fevers or infectious symptoms.        Medications:   Current Outpatient Medications   Medication Sig Dispense Refill    pantoprazole (PROTONIX) 40 MG tablet Take 1 tablet by mouth in the morning and at bedtime 60 tablet 0    folic acid (FOLVITE) 1 MG tablet Take 1 tablet by mouth daily 30 tablet 0    metoclopramide (REGLAN) 5 MG tablet Take 1 tablet by mouth 3 times daily 90 tablet 0    sennosides-docusate sodium (SENOKOT-S) 8.6-50 MG tablet Take 2 tablets by mouth daily as needed for Constipation 90 tablet 0    potassium chloride (KLOR-CON M) 20 MEQ extended release tablet Take 1 tablet by mouth 2 times daily 60 tablet 1    lidocaine-prilocaine (EMLA) 2.5-2.5 % cream PRN  Apply to port about 45 minutes prior to access      ondansetron (ZOFRAN) 8 MG tablet Take 8 mg by mouth every 8 hours as needed      lactulose (CHRONULAC) 10 GM/15ML solution Take 30 mLs by mouth 2 times daily as needed (constipation) (Patient not taking: No sig reported) 473 mL 0    vitamin D3 (CHOLECALCIFEROL) 10 MCG (400 UNIT) TABS tablet Take 2 tablets by mouth daily (Patient not taking: No sig reported) 60 tablet 0    sucralfate (CARAFATE) 1 GM tablet Take 1 tablet by mouth 4 times daily (Patient not taking: No sig reported) 120 tablet 3     No current facility-administered medications for this visit. Facility-Administered Medications Ordered in Other Visits   Medication Dose Route Frequency Provider Last Rate Last Admin    sodium chloride flush 0.9 % injection 10 mL  10 mL IntraVENous PRN Jordon Arredondo MD   10 mL at 11/25/22 1005       Allergies:  No Known Allergies    Review of Systems: The Review of Systems is documented in full in the internal medical record. All systems are negative other than for those noted above. Past Medical History:  Documented in electronic medical record    Past Surgical History:  Documented in electronic medical record    Social History:  Documented in electronic medical record    Family History:  Documented in electronic medical record      Physical Examination:  General Appearance: Chronically ill appearing patient in no acute distress. Vital signs: /88 (Site: Right Upper Arm, Position: Sitting)   Pulse (!) 110   Temp 98.7 °F (37.1 °C)   Resp 14   Ht 5' 4\" (1.626 m)   Wt 105 lb (47.6 kg)   SpO2 100%   BMI 18.02 kg/m²     Performance status: ECOG Level:2  Distress  Screening Score: PHQ-9 Total Score: 0 (11/25/2022 10:29 AM)  Pain Score:   0 - No pain (Fatigue- 7)    HEENT: No oral exam.  Neck: Supple. There is no thyromegaly. Lymph nodes: Deferred  Breasts: Not examined. Lungs: The lungs are clear to auscultation. There is no chest wall tenderness and no use of accessory respiratory musculature. Heart: There is no jugular venous distention. The rate is normal and rhythm regular. The S1 and S2 are normal and there are no murmurs or rubs.     Abdomen: Soft, non-tender, distended, bowel sounds present and normal.  Abdominal Pleurx catheter in place.  Skin: No rash, petechiae or ecchymoses. No evidence of malignancy. Extremities: No cyanosis, clubbing; trace lower extremity edema. Labs/Imaging:  Lab Results   Component Value Date/Time    WBC 4.8 11/25/2022 09:59 AM    HGB 9.0 11/25/2022 09:59 AM    HCT 29.1 11/25/2022 09:59 AM     11/25/2022 09:59 AM    MCV 88.7 11/25/2022 09:59 AM       Lab Results   Component Value Date/Time     11/25/2022 09:59 AM    K 4.2 11/25/2022 09:59 AM     11/25/2022 09:59 AM    CO2 27 11/25/2022 09:59 AM    BUN 22 11/25/2022 09:59 AM    GFRAA >60 09/27/2022 02:37 AM    GLOB 3.8 11/25/2022 09:59 AM    ALT 14 11/25/2022 09:59 AM       Above results reviewed with patient. ASSESSMENT:   Ms. Brunilda Quiroz has an apparent metastatic carcinoma of unknown primary. Pathologically, the tumor seems to resemble a process arising in the upper gastrointestinal tract. However, any such primary is  not visible on EGD or PET scanning. Dr. Tabor Service presumption is that this may have originated from the colon based on its location and behavior. That too would be somewhat inconsistent with the pathology findings. She has been treated with FOLFOX and Avastin. There was certainly no symptomatic response and in fact she developed progressive and symptomatic ascites suggesting disease progression. She is now on TPN and receiving paclitaxel and ramucirumab. She has been relatively stable in the interval since I last saw her and there are some indications that perhaps she is having a response, most notably her decreasing need for drainage of her abdomen. Protein calorie malnutrition-now on TPN. She returns today for follow-up and C2D15 Cyramza/Taxol. Overall, she is doing OK since last seen. There is no nausea and bowels are stable with loose stools, but she does not describe at diarrhea. TPN is ongoing and she will chew foods for pleasure and then spit out, weight is stable. Fatigue is ongoing.   There is no cough, shortness of breath, or edema. Neuropathy is stable. She denies any pain. Dry skin is her biggest complaint-encouraged regular use of thick lotions/creams. She reports draining only ~300ml weekly from her abdominal pleurX. Denies any fevers or infectious symptoms. Labs reviewed and adequate to proceed with C2D15 Cryramza/Taxol as planned. Continue on TPN. Return for follow up prior to C3D1.            RESUSCITATION DIRECTIVES/HOSPICE CARE;  Full Support        Mak Fritz (Sudeep Alonso) [address] [place] [address] Hematology and Oncology  [address]  Office : [phone]  Fax : [phone]

## 2022-11-27 DIAGNOSIS — C80.1 METASTASIS TO PERITONEUM OF UNKNOWN PRIMARY (HCC): Primary | ICD-10-CM

## 2022-11-27 DIAGNOSIS — C78.6 METASTASIS TO PERITONEUM OF UNKNOWN PRIMARY (HCC): Primary | ICD-10-CM

## 2022-12-08 ENCOUNTER — HOSPITAL ENCOUNTER (OUTPATIENT)
Dept: INFUSION THERAPY | Age: 47
Discharge: HOME | End: 2022-12-08
Payer: COMMERCIAL

## 2022-12-08 ENCOUNTER — OFFICE VISIT (OUTPATIENT)
Dept: ONCOLOGY | Age: 47
End: 2022-12-08
Payer: COMMERCIAL

## 2022-12-08 ENCOUNTER — OFFICE VISIT (OUTPATIENT)
Dept: ONCOLOGY | Age: 47
End: 2022-12-08

## 2022-12-08 ENCOUNTER — OFFICE VISIT (OUTPATIENT)
Dept: PALLATIVE CARE | Age: 47
End: 2022-12-08
Payer: COMMERCIAL

## 2022-12-08 VITALS
SYSTOLIC BLOOD PRESSURE: 122 MMHG | RESPIRATION RATE: 16 BRPM | OXYGEN SATURATION: 100 % | WEIGHT: 108.6 LBS | HEART RATE: 99 BPM | HEIGHT: 64 IN | DIASTOLIC BLOOD PRESSURE: 86 MMHG | BODY MASS INDEX: 18.54 KG/M2 | TEMPERATURE: 99.4 F

## 2022-12-08 DIAGNOSIS — C78.6 METASTASIS TO PERITONEUM OF UNKNOWN PRIMARY (HCC): ICD-10-CM

## 2022-12-08 DIAGNOSIS — Z51.5 ENCOUNTER FOR PALLIATIVE CARE: ICD-10-CM

## 2022-12-08 DIAGNOSIS — C80.1 METASTASIS TO PERITONEUM OF UNKNOWN PRIMARY (HCC): Primary | ICD-10-CM

## 2022-12-08 DIAGNOSIS — Z78.9 ON TOTAL PARENTERAL NUTRITION (TPN): ICD-10-CM

## 2022-12-08 DIAGNOSIS — C80.1 CARCINOMA OF UNKNOWN PRIMARY (HCC): ICD-10-CM

## 2022-12-08 DIAGNOSIS — R53.0 NEOPLASTIC MALIGNANT RELATED FATIGUE: ICD-10-CM

## 2022-12-08 DIAGNOSIS — R45.82 FEELING WORRIED: Primary | ICD-10-CM

## 2022-12-08 DIAGNOSIS — Z00.8 NUTRITIONAL ASSESSMENT: Primary | ICD-10-CM

## 2022-12-08 DIAGNOSIS — C78.6 METASTASIS TO PERITONEUM OF UNKNOWN PRIMARY (HCC): Primary | ICD-10-CM

## 2022-12-08 DIAGNOSIS — C80.1 METASTASIS TO PERITONEUM OF UNKNOWN PRIMARY (HCC): ICD-10-CM

## 2022-12-08 LAB
ALBUMIN SERPL-MCNC: 2.5 G/DL (ref 3.5–5)
ALBUMIN/GLOB SERPL: 0.7 {RATIO} (ref 0.4–1.6)
ALP SERPL-CCNC: 58 U/L (ref 50–136)
ALT SERPL-CCNC: 14 U/L (ref 12–65)
ANION GAP SERPL CALC-SCNC: 6 MMOL/L (ref 2–11)
APPEARANCE UR: ABNORMAL
AST SERPL-CCNC: 19 U/L (ref 15–37)
BACTERIA URNS QL MICRO: ABNORMAL /HPF
BASOPHILS # BLD: 0 K/UL (ref 0–0.2)
BASOPHILS NFR BLD: 0 % (ref 0–2)
BILIRUB SERPL-MCNC: 0.3 MG/DL (ref 0.2–1.1)
BILIRUB UR QL: NEGATIVE
BUN SERPL-MCNC: 22 MG/DL (ref 6–23)
CALCIUM SERPL-MCNC: 8.8 MG/DL (ref 8.3–10.4)
CASTS URNS QL MICRO: 0 /LPF
CEA SERPL-MCNC: 3.2 NG/ML (ref 0–3)
CHLORIDE SERPL-SCNC: 105 MMOL/L (ref 101–110)
CO2 SERPL-SCNC: 26 MMOL/L (ref 21–32)
COLOR UR: YELLOW
CREAT SERPL-MCNC: 0.2 MG/DL (ref 0.6–1)
CRYSTALS URNS QL MICRO: 0 /LPF
DIFFERENTIAL METHOD BLD: ABNORMAL
EOSINOPHIL # BLD: 0.1 K/UL (ref 0–0.8)
EOSINOPHIL NFR BLD: 2 % (ref 0.5–7.8)
EPI CELLS #/AREA URNS HPF: ABNORMAL /HPF
ERYTHROCYTE [DISTWIDTH] IN BLOOD BY AUTOMATED COUNT: 19.8 % (ref 11.9–14.6)
GLOBULIN SER CALC-MCNC: 3.7 G/DL (ref 2.8–4.5)
GLUCOSE SERPL-MCNC: 90 MG/DL (ref 65–100)
GLUCOSE UR STRIP.AUTO-MCNC: NEGATIVE MG/DL
HCT VFR BLD AUTO: 30.1 % (ref 35.8–46.3)
HGB BLD-MCNC: 8.8 G/DL (ref 11.7–15.4)
HGB UR QL STRIP: ABNORMAL
IMM GRANULOCYTES # BLD AUTO: 0 K/UL (ref 0–0.5)
IMM GRANULOCYTES NFR BLD AUTO: 0 % (ref 0–5)
KETONES UR QL STRIP.AUTO: NEGATIVE MG/DL
LEUKOCYTE ESTERASE UR QL STRIP.AUTO: ABNORMAL
LYMPHOCYTES # BLD: 1.6 K/UL (ref 0.5–4.6)
LYMPHOCYTES NFR BLD: 33 % (ref 13–44)
MAGNESIUM SERPL-MCNC: 2 MG/DL (ref 1.8–2.4)
MCH RBC QN AUTO: 26.2 PG (ref 26.1–32.9)
MCHC RBC AUTO-ENTMCNC: 29.2 G/DL (ref 31.4–35)
MCV RBC AUTO: 89.6 FL (ref 82–102)
MONOCYTES # BLD: 0.7 K/UL (ref 0.1–1.3)
MONOCYTES NFR BLD: 15 % (ref 4–12)
MUCOUS THREADS URNS QL MICRO: ABNORMAL /LPF
NEUTS SEG # BLD: 2.4 K/UL (ref 1.7–8.2)
NEUTS SEG NFR BLD: 50 % (ref 43–78)
NITRITE UR QL STRIP.AUTO: NEGATIVE
NRBC # BLD: 0 K/UL (ref 0–0.2)
OTHER OBSERVATIONS: ABNORMAL
PH UR STRIP: 6.5 [PH] (ref 5–9)
PLATELET # BLD AUTO: 351 K/UL (ref 150–450)
PMV BLD AUTO: 9.4 FL (ref 9.4–12.3)
POTASSIUM SERPL-SCNC: 4.3 MMOL/L (ref 3.5–5.1)
PROT SERPL-MCNC: 6.2 G/DL (ref 6.3–8.2)
PROT UR STRIP-MCNC: 100 MG/DL
RBC # BLD AUTO: 3.36 M/UL (ref 4.05–5.2)
RBC #/AREA URNS HPF: ABNORMAL /HPF
SODIUM SERPL-SCNC: 137 MMOL/L (ref 133–143)
SP GR UR REFRACTOMETRY: 1.02 (ref 1–1.02)
UROBILINOGEN UR QL STRIP.AUTO: 0.2 EU/DL (ref 0.2–1)
WBC # BLD AUTO: 4.9 K/UL (ref 4.3–11.1)
WBC URNS QL MICRO: ABNORMAL /HPF

## 2022-12-08 PROCEDURE — 82378 CARCINOEMBRYONIC ANTIGEN: CPT

## 2022-12-08 PROCEDURE — 80053 COMPREHEN METABOLIC PANEL: CPT

## 2022-12-08 PROCEDURE — 2580000003 HC RX 258: Performed by: INTERNAL MEDICINE

## 2022-12-08 PROCEDURE — 99215 OFFICE O/P EST HI 40 MIN: CPT | Performed by: INTERNAL MEDICINE

## 2022-12-08 PROCEDURE — A4216 STERILE WATER/SALINE, 10 ML: HCPCS | Performed by: INTERNAL MEDICINE

## 2022-12-08 PROCEDURE — 2500000003 HC RX 250 WO HCPCS: Performed by: INTERNAL MEDICINE

## 2022-12-08 PROCEDURE — 85025 COMPLETE CBC W/AUTO DIFF WBC: CPT

## 2022-12-08 PROCEDURE — 36591 DRAW BLOOD OFF VENOUS DEVICE: CPT

## 2022-12-08 PROCEDURE — 96417 CHEMO IV INFUS EACH ADDL SEQ: CPT

## 2022-12-08 PROCEDURE — 99214 OFFICE O/P EST MOD 30 MIN: CPT | Performed by: NURSE PRACTITIONER

## 2022-12-08 PROCEDURE — 96413 CHEMO IV INFUSION 1 HR: CPT

## 2022-12-08 PROCEDURE — 83735 ASSAY OF MAGNESIUM: CPT

## 2022-12-08 PROCEDURE — 6360000002 HC RX W HCPCS: Performed by: INTERNAL MEDICINE

## 2022-12-08 PROCEDURE — 96375 TX/PRO/DX INJ NEW DRUG ADDON: CPT

## 2022-12-08 PROCEDURE — 81001 URINALYSIS AUTO W/SCOPE: CPT

## 2022-12-08 RX ORDER — FAMOTIDINE 10 MG/ML
20 INJECTION, SOLUTION INTRAVENOUS
OUTPATIENT
Start: 2022-12-22

## 2022-12-08 RX ORDER — SODIUM CHLORIDE 0.9 % (FLUSH) 0.9 %
5-40 SYRINGE (ML) INJECTION PRN
Status: CANCELLED | OUTPATIENT
Start: 2022-12-08

## 2022-12-08 RX ORDER — ALBUTEROL SULFATE 90 UG/1
4 AEROSOL, METERED RESPIRATORY (INHALATION) PRN
OUTPATIENT
Start: 2022-12-08

## 2022-12-08 RX ORDER — SODIUM CHLORIDE 0.9 % (FLUSH) 0.9 %
5-40 SYRINGE (ML) INJECTION PRN
Status: DISCONTINUED | OUTPATIENT
Start: 2022-12-08 | End: 2022-12-09 | Stop reason: HOSPADM

## 2022-12-08 RX ORDER — SODIUM CHLORIDE 9 MG/ML
5-40 INJECTION INTRAVENOUS PRN
OUTPATIENT
Start: 2022-12-22

## 2022-12-08 RX ORDER — ONDANSETRON 2 MG/ML
8 INJECTION INTRAMUSCULAR; INTRAVENOUS ONCE
OUTPATIENT
Start: 2022-12-15 | End: 2022-12-15

## 2022-12-08 RX ORDER — SODIUM CHLORIDE 0.9 % (FLUSH) 0.9 %
5-40 SYRINGE (ML) INJECTION PRN
OUTPATIENT
Start: 2022-12-15

## 2022-12-08 RX ORDER — SODIUM CHLORIDE 9 MG/ML
5-250 INJECTION, SOLUTION INTRAVENOUS PRN
OUTPATIENT
Start: 2022-12-15

## 2022-12-08 RX ORDER — SODIUM CHLORIDE 9 MG/ML
5-250 INJECTION, SOLUTION INTRAVENOUS PRN
OUTPATIENT
Start: 2022-12-22

## 2022-12-08 RX ORDER — ACETAMINOPHEN 325 MG/1
650 TABLET ORAL
OUTPATIENT
Start: 2022-12-08

## 2022-12-08 RX ORDER — ONDANSETRON 2 MG/ML
8 INJECTION INTRAMUSCULAR; INTRAVENOUS ONCE
OUTPATIENT
Start: 2022-12-22 | End: 2022-12-22

## 2022-12-08 RX ORDER — SODIUM CHLORIDE 9 MG/ML
5-250 INJECTION, SOLUTION INTRAVENOUS PRN
Status: CANCELLED | OUTPATIENT
Start: 2022-12-08

## 2022-12-08 RX ORDER — FAMOTIDINE 10 MG/ML
20 INJECTION, SOLUTION INTRAVENOUS ONCE
OUTPATIENT
Start: 2022-12-15 | End: 2022-12-15

## 2022-12-08 RX ORDER — ONDANSETRON 2 MG/ML
8 INJECTION INTRAMUSCULAR; INTRAVENOUS
OUTPATIENT
Start: 2022-12-15

## 2022-12-08 RX ORDER — ALBUTEROL SULFATE 90 UG/1
4 AEROSOL, METERED RESPIRATORY (INHALATION) PRN
OUTPATIENT
Start: 2022-12-22

## 2022-12-08 RX ORDER — SODIUM CHLORIDE 9 MG/ML
5-250 INJECTION, SOLUTION INTRAVENOUS PRN
Status: DISCONTINUED | OUTPATIENT
Start: 2022-12-08 | End: 2022-12-09 | Stop reason: HOSPADM

## 2022-12-08 RX ORDER — SODIUM CHLORIDE 9 MG/ML
5-40 INJECTION INTRAVENOUS PRN
OUTPATIENT
Start: 2022-12-08

## 2022-12-08 RX ORDER — FAMOTIDINE 10 MG/ML
20 INJECTION, SOLUTION INTRAVENOUS ONCE
Status: CANCELLED | OUTPATIENT
Start: 2022-12-08 | End: 2022-12-08

## 2022-12-08 RX ORDER — EPINEPHRINE 1 MG/ML
0.3 INJECTION, SOLUTION, CONCENTRATE INTRAVENOUS PRN
OUTPATIENT
Start: 2022-12-22

## 2022-12-08 RX ORDER — ACETAMINOPHEN 325 MG/1
650 TABLET ORAL
OUTPATIENT
Start: 2022-12-22

## 2022-12-08 RX ORDER — SODIUM CHLORIDE 0.9 % (FLUSH) 0.9 %
5-40 SYRINGE (ML) INJECTION PRN
OUTPATIENT
Start: 2022-12-22

## 2022-12-08 RX ORDER — FAMOTIDINE 10 MG/ML
20 INJECTION, SOLUTION INTRAVENOUS
OUTPATIENT
Start: 2022-12-15

## 2022-12-08 RX ORDER — ONDANSETRON 2 MG/ML
8 INJECTION INTRAMUSCULAR; INTRAVENOUS ONCE
Status: COMPLETED | OUTPATIENT
Start: 2022-12-08 | End: 2022-12-08

## 2022-12-08 RX ORDER — ALBUTEROL SULFATE 90 UG/1
4 AEROSOL, METERED RESPIRATORY (INHALATION) PRN
OUTPATIENT
Start: 2022-12-15

## 2022-12-08 RX ORDER — SODIUM CHLORIDE 9 MG/ML
INJECTION, SOLUTION INTRAVENOUS CONTINUOUS
OUTPATIENT
Start: 2022-12-08

## 2022-12-08 RX ORDER — MEPERIDINE HYDROCHLORIDE 50 MG/ML
12.5 INJECTION INTRAMUSCULAR; INTRAVENOUS; SUBCUTANEOUS PRN
OUTPATIENT
Start: 2022-12-22

## 2022-12-08 RX ORDER — SODIUM CHLORIDE 9 MG/ML
INJECTION, SOLUTION INTRAVENOUS CONTINUOUS
OUTPATIENT
Start: 2022-12-22

## 2022-12-08 RX ORDER — EPINEPHRINE 1 MG/ML
0.3 INJECTION, SOLUTION, CONCENTRATE INTRAVENOUS PRN
OUTPATIENT
Start: 2022-12-15

## 2022-12-08 RX ORDER — DIPHENHYDRAMINE HYDROCHLORIDE 50 MG/ML
50 INJECTION INTRAMUSCULAR; INTRAVENOUS ONCE
Status: COMPLETED | OUTPATIENT
Start: 2022-12-08 | End: 2022-12-08

## 2022-12-08 RX ORDER — DIPHENHYDRAMINE HYDROCHLORIDE 50 MG/ML
50 INJECTION INTRAMUSCULAR; INTRAVENOUS
OUTPATIENT
Start: 2022-12-22

## 2022-12-08 RX ORDER — ACETAMINOPHEN 325 MG/1
650 TABLET ORAL
OUTPATIENT
Start: 2022-12-15

## 2022-12-08 RX ORDER — ONDANSETRON 2 MG/ML
8 INJECTION INTRAMUSCULAR; INTRAVENOUS ONCE
Status: CANCELLED | OUTPATIENT
Start: 2022-12-08 | End: 2022-12-08

## 2022-12-08 RX ORDER — ONDANSETRON 2 MG/ML
8 INJECTION INTRAMUSCULAR; INTRAVENOUS
OUTPATIENT
Start: 2022-12-22

## 2022-12-08 RX ORDER — DIPHENHYDRAMINE HYDROCHLORIDE 50 MG/ML
50 INJECTION INTRAMUSCULAR; INTRAVENOUS ONCE
OUTPATIENT
Start: 2022-12-15 | End: 2022-12-15

## 2022-12-08 RX ORDER — HEPARIN SODIUM (PORCINE) LOCK FLUSH IV SOLN 100 UNIT/ML 100 UNIT/ML
500 SOLUTION INTRAVENOUS PRN
OUTPATIENT
Start: 2022-12-22

## 2022-12-08 RX ORDER — FAMOTIDINE 10 MG/ML
20 INJECTION, SOLUTION INTRAVENOUS
OUTPATIENT
Start: 2022-12-08

## 2022-12-08 RX ORDER — DIPHENHYDRAMINE HYDROCHLORIDE 50 MG/ML
50 INJECTION INTRAMUSCULAR; INTRAVENOUS ONCE
OUTPATIENT
Start: 2022-12-22 | End: 2022-12-22

## 2022-12-08 RX ORDER — HEPARIN SODIUM (PORCINE) LOCK FLUSH IV SOLN 100 UNIT/ML 100 UNIT/ML
500 SOLUTION INTRAVENOUS PRN
OUTPATIENT
Start: 2022-12-15

## 2022-12-08 RX ORDER — DIPHENHYDRAMINE HYDROCHLORIDE 50 MG/ML
50 INJECTION INTRAMUSCULAR; INTRAVENOUS ONCE
Status: CANCELLED | OUTPATIENT
Start: 2022-12-08 | End: 2022-12-08

## 2022-12-08 RX ORDER — ONDANSETRON 2 MG/ML
8 INJECTION INTRAMUSCULAR; INTRAVENOUS
OUTPATIENT
Start: 2022-12-08

## 2022-12-08 RX ORDER — MEPERIDINE HYDROCHLORIDE 50 MG/ML
12.5 INJECTION INTRAMUSCULAR; INTRAVENOUS; SUBCUTANEOUS PRN
OUTPATIENT
Start: 2022-12-15

## 2022-12-08 RX ORDER — DEXAMETHASONE SODIUM PHOSPHATE 10 MG/ML
10 INJECTION INTRAMUSCULAR; INTRAVENOUS ONCE
Status: COMPLETED | OUTPATIENT
Start: 2022-12-08 | End: 2022-12-08

## 2022-12-08 RX ORDER — SODIUM CHLORIDE 9 MG/ML
5-40 INJECTION INTRAVENOUS PRN
OUTPATIENT
Start: 2022-12-15

## 2022-12-08 RX ORDER — FAMOTIDINE 10 MG/ML
20 INJECTION, SOLUTION INTRAVENOUS ONCE
OUTPATIENT
Start: 2022-12-22 | End: 2022-12-22

## 2022-12-08 RX ORDER — MEPERIDINE HYDROCHLORIDE 50 MG/ML
12.5 INJECTION INTRAMUSCULAR; INTRAVENOUS; SUBCUTANEOUS PRN
OUTPATIENT
Start: 2022-12-08

## 2022-12-08 RX ORDER — HEPARIN SODIUM (PORCINE) LOCK FLUSH IV SOLN 100 UNIT/ML 100 UNIT/ML
500 SOLUTION INTRAVENOUS PRN
OUTPATIENT
Start: 2022-12-08

## 2022-12-08 RX ORDER — EPINEPHRINE 1 MG/ML
0.3 INJECTION, SOLUTION, CONCENTRATE INTRAVENOUS PRN
OUTPATIENT
Start: 2022-12-08

## 2022-12-08 RX ORDER — SODIUM CHLORIDE 9 MG/ML
5-250 INJECTION, SOLUTION INTRAVENOUS PRN
OUTPATIENT
Start: 2022-12-08

## 2022-12-08 RX ORDER — SODIUM CHLORIDE 9 MG/ML
INJECTION, SOLUTION INTRAVENOUS CONTINUOUS
OUTPATIENT
Start: 2022-12-15

## 2022-12-08 RX ORDER — DIPHENHYDRAMINE HYDROCHLORIDE 50 MG/ML
50 INJECTION INTRAMUSCULAR; INTRAVENOUS
OUTPATIENT
Start: 2022-12-08

## 2022-12-08 RX ORDER — DIPHENHYDRAMINE HYDROCHLORIDE 50 MG/ML
50 INJECTION INTRAMUSCULAR; INTRAVENOUS
OUTPATIENT
Start: 2022-12-15

## 2022-12-08 RX ORDER — SODIUM CHLORIDE 0.9 % (FLUSH) 0.9 %
10 SYRINGE (ML) INJECTION PRN
Status: DISCONTINUED | OUTPATIENT
Start: 2022-12-08 | End: 2022-12-09 | Stop reason: HOSPADM

## 2022-12-08 RX ADMIN — DEXAMETHASONE SODIUM PHOSPHATE 10 MG: 10 INJECTION INTRAMUSCULAR; INTRAVENOUS at 13:09

## 2022-12-08 RX ADMIN — SODIUM CHLORIDE, PRESERVATIVE FREE 10 ML: 5 INJECTION INTRAVENOUS at 08:20

## 2022-12-08 RX ADMIN — SODIUM CHLORIDE 386 MG: 9 INJECTION, SOLUTION INTRAVENOUS at 11:54

## 2022-12-08 RX ADMIN — DIPHENHYDRAMINE HYDROCHLORIDE 50 MG: 50 INJECTION, SOLUTION INTRAMUSCULAR; INTRAVENOUS at 09:58

## 2022-12-08 RX ADMIN — FAMOTIDINE 20 MG: 10 INJECTION, SOLUTION INTRAVENOUS at 13:07

## 2022-12-08 RX ADMIN — PACLITAXEL 120 MG: 6 INJECTION, SOLUTION, CONCENTRATE INTRAVENOUS at 13:34

## 2022-12-08 RX ADMIN — SODIUM CHLORIDE, PRESERVATIVE FREE 10 ML: 5 INJECTION INTRAVENOUS at 09:45

## 2022-12-08 RX ADMIN — ONDANSETRON 8 MG: 2 INJECTION INTRAMUSCULAR; INTRAVENOUS at 13:05

## 2022-12-08 ASSESSMENT — PATIENT HEALTH QUESTIONNAIRE - PHQ9
3. TROUBLE FALLING OR STAYING ASLEEP: 0
9. THOUGHTS THAT YOU WOULD BE BETTER OFF DEAD, OR OF HURTING YOURSELF: 0
5. POOR APPETITE OR OVEREATING: 0
SUM OF ALL RESPONSES TO PHQ QUESTIONS 1-9: 1
10. IF YOU CHECKED OFF ANY PROBLEMS, HOW DIFFICULT HAVE THESE PROBLEMS MADE IT FOR YOU TO DO YOUR WORK, TAKE CARE OF THINGS AT HOME, OR GET ALONG WITH OTHER PEOPLE: 0
1. LITTLE INTEREST OR PLEASURE IN DOING THINGS: 0
7. TROUBLE CONCENTRATING ON THINGS, SUCH AS READING THE NEWSPAPER OR WATCHING TELEVISION: 0
SUM OF ALL RESPONSES TO PHQ QUESTIONS 1-9: 1
SUM OF ALL RESPONSES TO PHQ9 QUESTIONS 1 & 2: 0
4. FEELING TIRED OR HAVING LITTLE ENERGY: 1
8. MOVING OR SPEAKING SO SLOWLY THAT OTHER PEOPLE COULD HAVE NOTICED. OR THE OPPOSITE, BEING SO FIGETY OR RESTLESS THAT YOU HAVE BEEN MOVING AROUND A LOT MORE THAN USUAL: 0
SUM OF ALL RESPONSES TO PHQ QUESTIONS 1-9: 1
2. FEELING DOWN, DEPRESSED OR HOPELESS: 0
6. FEELING BAD ABOUT YOURSELF - OR THAT YOU ARE A FAILURE OR HAVE LET YOURSELF OR YOUR FAMILY DOWN: 0
SUM OF ALL RESPONSES TO PHQ QUESTIONS 1-9: 1

## 2022-12-08 ASSESSMENT — ENCOUNTER SYMPTOMS
ABDOMINAL PAIN: 0
NAUSEA: 1

## 2022-12-08 ASSESSMENT — ANXIETY QUESTIONNAIRES
IF YOU CHECKED OFF ANY PROBLEMS ON THIS QUESTIONNAIRE, HOW DIFFICULT HAVE THESE PROBLEMS MADE IT FOR YOU TO DO YOUR WORK, TAKE CARE OF THINGS AT HOME, OR GET ALONG WITH OTHER PEOPLE: NOT DIFFICULT AT ALL
5. BEING SO RESTLESS THAT IT IS HARD TO SIT STILL: 0
3. WORRYING TOO MUCH ABOUT DIFFERENT THINGS: 0
7. FEELING AFRAID AS IF SOMETHING AWFUL MIGHT HAPPEN: 0
4. TROUBLE RELAXING: 0
GAD7 TOTAL SCORE: 0
6. BECOMING EASILY ANNOYED OR IRRITABLE: 0
2. NOT BEING ABLE TO STOP OR CONTROL WORRYING: 0
1. FEELING NERVOUS, ANXIOUS, OR ON EDGE: 0

## 2022-12-08 NOTE — PROGRESS NOTES
Arrived to the [facility]. Cryamza and Taxol infusion completed. Urine sample collected. Patient tolerated well. Any issues or concerns during appointment: none. Patient aware of next infusion appointment on 12/15/2022 (date) at 56 (time). Discharged ambulatory.

## 2022-12-08 NOTE — PROGRESS NOTES
HEMATOLOGY/ONCOLOGY FOLLOWUP  VISIT      Patient Name: Ofe Polanco             Date of Visit: 2022  : 1975  Age:47 y.o. Presenting Complaint:  Ofe Polanco  is seen in follow-up for carcinoma of unknown primary. History of Present Illness:  Ms. Adilia Workman was seen for the first time in our office in 2022. She was a woman of previously good health who began noticing left-sided back discomfort in early 2022. This was associated  ultimately with a sense of difficulty swallowing and ultimately she presented to [facility] for evaluation. Her symptoms were felt to potentially be indicative of a bowel obstruction and she was sent for a CT scan. This study, performed on 2022  was notable for a linear calcific density along the course of the proximal left ureter with associated moderate hydronephrosis potentially indicative of an intraureteral calculus. There was also stranding throughout the retroperitoneal soft tissues anterior  to the left psoas muscle with pelvic ascites. There was also wall thickening involving the descending colon. The question of colitis or serosal implants was raised. The patient had undergone a gastric sleeve placement several years prior. She had  also undergone a negative colonoscopy about 3 years prior to these presentations. There was a history of anemia ultimately resulting in the performance of a hysterectomy approximately 3 years ago for menorrhagia. Because of the CT scan findings, she  was ultimately referred to Dr. Earnest Cespedes for evaluation of a possible pelvic malignancy. On 2022 he performed a laparoscopy and biopsy with evidence of peritoneal carcinomatosis grossly. A \"peritoneal nodule\" and a \"peritoneal implant\"  were both positive for a poorly differentiated metastatic carcinoma potentially consistent with an upper gastrointestinal primary.   An omental biopsy showed no evidence of malignancy. Immunohistochemistries were positive for cytokeratin 7 and negative  for cytokeratin 20. The tumor was weakly positive for CDX2. The only other positive study was MOC-31. Testing through [employer] demonstrated no mutation and ERB-B2. There was no microsatellite instability and no mismatch repair protein deficiencies. There were no targetable lesions. A PET scan was subsequently performed on  showing elevated FDG uptake in the left pelvis corresponding with a masslike density concerning  for peritoneal carcinomatosis. There was a small volume of free fluid within the peritoneal cavity. There was moderate right hydroureteronephrosis. Despite the pathologic findings, there was no evidence of FDG activity in the upper gastrointestinal  tract. CA-125 was slightly elevated at 44. Given the uncertainty of her diagnosis, the patient went back for additional surgery on February 15 at which time she underwent bilateral ureteral stent placement and bilateral salpingo-oophorectomies. Pathology  from that surgery was notable for poorly differentiated carcinoma with occasional signet ring features. Immunohistochemical stains were most consistent with a tumor of the upper gastrointestinal tract. She is  1 para 1 abortus 0. There is no  family history of cancer. She has had no difficulties with vomiting. She still notes that some food traverses her esophagus slowly. She had undergone a prior dilatation without any evidence of cancer. She subsequently began treatment with FOLFOX ultimately  with the addition of bevacizumab in 2022. She ultimately received 8 cycles of FOLFOX most of them with Avastin. On , she was taken to surgery for evaluation of debulking and HIPEC. Unfortunately, at the time of surgery her tumor was found to be tightly adherent and impossible to debulk.   Despite the FOLFOX, she developed progressive ascites necessitating placement of a Pleurx catheter. Based on this symptomatic progression, a decision was made to place her on paclitaxel and ramucirumab. In October, she was hospitalized for recurrent nausea and vomiting. She was placed on intravenous fluids and seen by gastroenterology. She underwent an esophageal dilatation as part of her EGD. Most importantly however a decision was made to place her on TPN. She returns today for follow-up. She is in good spirits. In fact, she looks much healthier than she had at the initiation of her treatment. She continues to receive TPN although the duration of treatment has been decreased to 14 hours/day. She has begun to gradually gain weight and has certainly filled out a bit. More importantly however, she has no pain. Her Pleurx drainage is now minimal.  She drained approximately 250 cc yesterday and had not drained prior to that for about 2 weeks. She notices some \"crumbling\" in her abdomen and feels a bit hungry. She has tried to eat a few things but this is given her diarrhea. She has no side effects from the chemotherapy. She denies any significant neuropathy. There has been no nausea or vomiting.       Medications:   Current Outpatient Medications   Medication Sig Dispense Refill    pantoprazole (PROTONIX) 40 MG tablet Take 1 tablet by mouth in the morning and at bedtime 60 tablet 0    folic acid (FOLVITE) 1 MG tablet Take 1 tablet by mouth daily 30 tablet 0    metoclopramide (REGLAN) 5 MG tablet Take 1 tablet by mouth 3 times daily (Patient taking differently: Take 5 mg by mouth in the morning and at bedtime) 90 tablet 0    sennosides-docusate sodium (SENOKOT-S) 8.6-50 MG tablet Take 2 tablets by mouth daily as needed for Constipation 90 tablet 0    ondansetron (ZOFRAN) 8 MG tablet Take 8 mg by mouth every 8 hours as needed      lactulose (CHRONULAC) 10 GM/15ML solution Take 30 mLs by mouth 2 times daily as needed (constipation) (Patient not taking: No sig reported) 473 mL 0 vitamin D3 (CHOLECALCIFEROL) 10 MCG (400 UNIT) TABS tablet Take 2 tablets by mouth daily (Patient not taking: No sig reported) 60 tablet 0    sucralfate (CARAFATE) 1 GM tablet Take 1 tablet by mouth 4 times daily (Patient not taking: No sig reported) 120 tablet 3    potassium chloride (KLOR-CON M) 20 MEQ extended release tablet Take 1 tablet by mouth 2 times daily (Patient not taking: Reported on 12/8/2022) 60 tablet 1    lidocaine-prilocaine (EMLA) 2.5-2.5 % cream PRN  Apply to port about 45 minutes prior to access (Patient not taking: Reported on 12/8/2022)       No current facility-administered medications for this visit. Facility-Administered Medications Ordered in Other Visits   Medication Dose Route Frequency Provider Last Rate Last Admin    sodium chloride flush 0.9 % injection 10 mL  10 mL IntraVENous PRN Jordon Arredondo MD   10 mL at 12/08/22 0820       Allergies:  No Known Allergies    Review of Systems: The Review of Systems is documented in full in the internal medical record. All systems are negative other than for those noted above. Past Medical History:  Documented in electronic medical record    Past Surgical History:  Documented in electronic medical record    Social History:  Documented in electronic medical record    Family History:  Documented in electronic medical record      Physical Examination:  General Appearance: Mildly ill appearing patient in no acute distress. Vital signs: /86   Pulse 99   Temp 99.4 °F (37.4 °C)   Resp 16   Ht 5' 4\" (1.626 m)   Wt 108 lb 9.6 oz (49.3 kg)   SpO2 100%   BMI 18.64 kg/m²     Performance status: ECOG Level:2  Distress  Screening Score: PHQ-9 Total Score: 1 (12/8/2022  8:32 AM)  Thoughts that you would be better off dead, or of hurting yourself in some way: 0 (12/8/2022  8:32 AM)  Pain Score:   0 - No pain    HEENT: No oral exam.  Neck: Supple. There is no thyromegaly. Lymph nodes:  There is no cervical, supraclavicular, axillary or inguinal adenopathy. Breasts: Not examined. Lungs: The lungs are clear to auscultation and percussion. There is no egophony. There is no chest wall tenderness and no  use of accessory respiratory musculature. Heart: There is no jugular venous distention. The rate is normal and rhythm regular. The S1 and S2 are normal and there are no murmurs or rubs. Abdomen: Soft, non-tender, distended, bowel sounds present and normal, no appreciated hepatosplenomegaly. No palpable masses. Abdominal Pleurx catheter in place. Skin: No rash, petechiae or ecchymoses. No evidence of malignancy. Extremities: No cyanosis, clubbing; trace lower extremity edema. Labs/Imaging:  Lab Results   Component Value Date/Time    WBC 4.9 12/08/2022 08:12 AM    HGB 8.8 12/08/2022 08:12 AM    HCT 30.1 12/08/2022 08:12 AM     12/08/2022 08:12 AM    MCV 89.6 12/08/2022 08:12 AM       Lab Results   Component Value Date/Time     12/08/2022 08:12 AM    K 4.3 12/08/2022 08:12 AM     12/08/2022 08:12 AM    CO2 26 12/08/2022 08:12 AM    BUN 22 12/08/2022 08:12 AM    GFRAA >60 09/27/2022 02:37 AM    GLOB 3.7 12/08/2022 08:12 AM    ALT 14 12/08/2022 08:12 AM       Above results reviewed with patient. ASSESSMENT:   Ms. Walker has an apparent metastatic carcinoma of unknown primary. Pathologically, the tumor seems to resemble a process arising in the upper gastrointestinal tract. However, any such primary is  not visible on EGD or PET scanning. Dr. Young Jon presumption is that this may have originated from the colon based on its location and behavior. That too would be somewhat inconsistent with the pathology findings. She has been treated with FOLFOX and Avastin. There was certainly no symptomatic response and in fact she developed progressive and symptomatic ascites suggesting disease progression. She is now on TPN and receiving paclitaxel and ramucirumab.   Subjectively, she appears to be having a response with decreased ascites, increased sense of wellbeing, and a n increased desire to eat. Protein calorie malnutrition-now on TPN but decreased relative to initiation. PLAN:  Proceed with additional paclitaxel and ramucirumab. Continue home TPN through [facility]. She will be scanned prior to her next visit with me. Given the nature of her disease spread, assessment of progression versus response may be difficult with CT scanning. If she continues to have a good subjective and objective response she may be a candidate for reexploration and possible HIPEC.      RESUSCITATION DIRECTIVES/HOSPICE CARE;  Full Support        [place] [facility]    Oncology and Hematology   [address]  P [phone]  F [phone]  [email or web address]

## 2022-12-08 NOTE — PROGRESS NOTES
Outpatient Palliative Care at the  [facility]: Office Visit Established Patient    Diagnosis: metastatic carcinoma of unknown primary    Treatment Plan: FOLFOX + Avastin--> Taxol/Cyramza    Treatment Intent: Palliative    Medical Oncologist: Dr. Ferguson Spotted Oncologist: N/A    Navigator: Radha Tipton RN      Chief Complaint:    Chief Complaint   Patient presents with    Fatigue       History of Present Illness:  Ms. Amarilys Montero is a 52 y.o. female who presents today for evaluation regarding introduction to palliative care. Patient initially presented with back pain, difficulty swallowing in January 2022. A CT on 1/27/22 showed soft tissue stranding throughout retroperitoneum, wall thickening of colon, and ascites. On 2/1/22, Dr. Tulio Villarreal performed laparoscopy and biopsy revealing peritoneal carcinomatosis; biopsy showed poorly differentiated metastatic carcinoma potentially consistent with upper GI primary. PET negative for activity in upper GI tract. She had bilateral ureteral stent placement and bilateral salpingo-oophorectomies on 2/15/22- biopsy from that surgery showed poorly differentiated carcinoma with occasional signet ring features. She is followed by Dr. Kayce Weinberg and began FOLFOX in April 2022. Late October, she was hospitalized for dysphagia, intractable vomiting due to peritoneal carcinomatosis. She was started on TPN. Patient lives with her boyfriend and 23BV daughter, Ronna Banks. She lives 1 mile from her mom. Her dad is not involved in her life and she nor her mom know how to contact her dad. Interval History:  Patient seen in coordination with her office visit with Dr. Kayce Weinberg. She is in a wheelchair and accompanied by  her mom. Over the last few weeks, patient has had improvement in several symptoms. Her nausea and vomiting have essentially resolved. She is able to tolerate more food PO. Her only complaint with food is diarrhea.   Patient has abdominal PleurX, and she has decreased frequency of draining from every week to every 2 weeks- she last removed 250-300mL. She continues TPN, but this has reduced to 14 hours per day. She denies pain. She denies anxiety, but does have difficulty sleeping sometimes due to her \"mind racing\". Review of Systems:  Review of Systems   Constitutional:  Positive for unexpected weight change. Gastrointestinal:  Positive for nausea. Negative for abdominal pain. Improved with TPN   Psychiatric/Behavioral:  Negative for dysphoric mood and sleep disturbance. The patient is not nervous/anxious. All other systems reviewed and are negative.       No Known Allergies  Past Medical History:   Diagnosis Date    Anemia     -- not a problem since hyst    Colon cancer ([address] dx 2022     plans for chemo--- followed by dr Elaine EL-19 2020    no hospitalization    GERD (gastroesophageal reflux disease)     managed with med    History of colonoscopy 2018    Dr. Marbella Stern, nl (see media note), R     Hx of blood clots 2022    per pt \"small clot on lung identified by CT scan\"  CT Scan impression:- Nonobstructive pulmonary filling defect involving Left Lower Lobe     Hypotension     asymptomatic    Obstruction of fallopian tube     per pt has \"1 good tube\"    Peritoneal carcinomatosis ([address]     Weight loss     80lbs weight loss after gastric sleeve     Past Surgical History:   Procedure Laterality Date     SECTION      CYSTOSCOPY Bilateral 2022    CYSTOSCOPY BILATERAL RETROGRADE PYELOGRAM performed by Darra Severe, MD at [address] Bilateral 2022    BILATERAL CYSTOSCOPY URETERAL STENT EXCHANGE performed by Darra Severe, MD at [address] Bilateral 10/6/2022    CYSTOSCOPY BILATERAL URETERAL STENT REMOVAL, RIGHT URETERAL [address],[facility] performed by Darra Severe, MD at [facility] MAIN OR    GASTRIC BYPASS SURGERY  2014    gastric sleeve- Choudhari    HYSTERECTOMY (CERVIX STATUS UNKNOWN)      2018    HYSTERECTOMY (CERVIX STATUS UNKNOWN)  07/09/2020    TLH w/ Bilateral salpingectomy and left oophorectomy    IR PERC CATH PLEURAL DRAIN W/IMAG  9/26/2022    IR PERC CATH PLEURAL DRAIN W/IMAG 9/26/2022 [facility]    IR PORT PLACEMENT EQUAL OR GREATER THAN 5 YEARS  2/28/2022    IR PORT PLACEMENT EQUAL OR GREATER THAN 5 YEARS  2/28/2022    IR PORT PLACEMENT EQUAL OR GREATER THAN 5 YEARS 2/28/2022 [facility]    LAPAROTOMY N/A 8/17/2022    EXPLORATORY LAPAROTOMY/ ENTEROENTEROSTOMY performed by Festus Spaulding MD at [facility]. [address]  age [place] 21s\"    also \"unblocked her FT\"    TONSILLECTOMY      UPPER GASTROINTESTINAL ENDOSCOPY      with dilation    UPPER GASTROINTESTINAL ENDOSCOPY N/A 9/13/2022    EGD ESOPHAGOGASTRODUODENOSCOPY OFL 17 To IR after recovery for Para performed by Kim Wharton MD at [facility] ENDOSCOPY    UPPER GASTROINTESTINAL ENDOSCOPY N/A 10/21/2022    EGD DILATION BALLOON performed by Katarina Lugo MD at [facility] ENDOSCOPY    UROLOGICAL SURGERY Bilateral 03/15/2022    cysto     Family History   Problem Relation Age of Onset    Heart Disease Maternal Grandmother     Hypertension Maternal Grandmother     Heart Disease Maternal Grandfather     Hypertension Maternal Grandfather     Pulmonary Embolism Neg Hx     Colon Cancer Neg Hx     Deep Vein Thrombosis Neg Hx     Ovarian Cancer Neg Hx     Prostate Cancer Neg Hx     Breast Cancer Neg Hx      Social History     Socioeconomic History    Marital status: Single     Spouse name: Not on file    Number of children: Not on file    Years of education: Not on file    Highest education level: Not on file   Occupational History    Not on file   Tobacco Use    Smoking status: Never    Smokeless tobacco: Never   Vaping Use    Vaping Use: Never used   Substance and Sexual Activity    Alcohol use: Not Currently    Drug use: No    Sexual activity: Not on file   Other Topics Concern    Not on file   Social History Narrative    1. Use large speculum. 2.  sister, Juanita Stovall #[id number] and mom is Donita Peralta #[id number] (both Kofoed's pts)  3. PCP  Dr. Yulisa Mancia ([facility]), GI Dr. Cheikh Bello-2018 normal EGD     Social Determinants of Health     Financial Resource Strain: Not on file   Food Insecurity: Not on file   Transportation Needs: Not on file   Physical Activity: Not on file   Stress: Not on file   Social Connections: Not on file   Intimate Partner Violence: Not on file   Housing Stability: Not on file     Current Outpatient Medications   Medication Sig Dispense Refill    pantoprazole (PROTONIX) 40 MG tablet Take 1 tablet by mouth in the morning and at bedtime 60 tablet 0    folic acid (FOLVITE) 1 MG tablet Take 1 tablet by mouth daily 30 tablet 0    lactulose (CHRONULAC) 10 GM/15ML solution Take 30 mLs by mouth 2 times daily as needed (constipation) (Patient not taking: No sig reported) 473 mL 0    metoclopramide (REGLAN) 5 MG tablet Take 1 tablet by mouth 3 times daily (Patient taking differently: Take 5 mg by mouth in the morning and at bedtime) 90 tablet 0    vitamin D3 (CHOLECALCIFEROL) 10 MCG (400 UNIT) TABS tablet Take 2 tablets by mouth daily (Patient not taking: No sig reported) 60 tablet 0    sennosides-docusate sodium (SENOKOT-S) 8.6-50 MG tablet Take 2 tablets by mouth daily as needed for Constipation 90 tablet 0    sucralfate (CARAFATE) 1 GM tablet Take 1 tablet by mouth 4 times daily (Patient not taking: No sig reported) 120 tablet 3    potassium chloride (KLOR-CON M) 20 MEQ extended release tablet Take 1 tablet by mouth 2 times daily (Patient not taking: Reported on 12/8/2022) 60 tablet 1    lidocaine-prilocaine (EMLA) 2.5-2.5 % cream PRN  Apply to port about 45 minutes prior to access (Patient not taking: Reported on 12/8/2022)      ondansetron (ZOFRAN) 8 MG tablet Take 8 mg by mouth every 8 hours as needed       No current facility-administered medications for this visit.      Facility-Administered Medications Ordered in Other Visits   Medication Dose Route Frequency Provider Last Rate Last Admin    sodium chloride flush 0.9 % injection 10 mL  10 mL IntraVENous PRN Jennifer Peralta MD   10 mL at 12/08/22 0820    0.9 % sodium chloride infusion  5-250 mL/hr IntraVENous PRN Jennifer Peralta MD        ramucirumab [facility]) 386 mg in sodium chloride 0.9 % 250 mL chemo IVPB  8 mg/kg (Treatment Plan Recorded) IntraVENous Once Jennifer Peralta MD        ondansetron [facility]) injection 8 mg  8 mg IntraVENous Once Jennifer Peralta MD        famotidine (PEPCID) 20 mg in sodium chloride (PF) 0.9 % 10 mL injection  20 mg IntraVENous Once Jennifer Peralta MD        dexamethasone (DECADRON) injection 10 mg  10 mg IntraVENous Once Jennifer Peralta MD        PACLitaxel (TAXOL) 120 mg in sodium chloride 0.9 % 250 mL chemo infusion  80 mg/m2 (Treatment Plan Recorded) IntraVENous Once Jennifer Peralta MD        sodium chloride flush 0.9 % injection 5-40 mL  5-40 mL IntraVENous PRN Jennifer Peralta MD           OBJECTIVE:  Wt Readings from Last 1 Encounters:   12/08/22 108 lb 9.6 oz (49.3 kg)     Temp Readings from Last 1 Encounters:   12/08/22 99.4 °F (37.4 °C)     BP Readings from Last 1 Encounters:   12/08/22 122/86     Pulse Readings from Last 1 Encounters:   12/08/22 99        Pain Score: Zero    Physical Exam:  Constitutional: Well developed, well nourished female in no acute distress. HEENT: Normocephalic and atraumatic. Extraocular muscles are intact. Sclerae anicteric. Neck supple without JVD. Lymph node   deferred   Skin Warm and dry. No bruising and no rash noted. No erythema. No pallor. Respiratory Unlabored respiratory effort. CVS Normotensive, normal rate   Abdomen Soft, nontender and nondistended. PleurX catheter present. Neuro Grossly nonfocal with no obvious sensory or motor deficits. MSK Normal range of motion in general.  No edema and no tenderness. Psych Appropriate mood and affect. Labs:  Recent Results (from the past 24 hour(s))   CBC with Auto Differential    Collection Time: 12/08/22  8:12 AM   Result Value Ref Range    WBC 4.9 4.3 - 11.1 K/uL    RBC 3.36 (L) 4.05 - 5.2 M/uL    Hemoglobin 8.8 (L) 11.7 - 15.4 g/dL    Hematocrit 30.1 (L) 35.8 - 46.3 %    MCV 89.6 82.0 - 102.0 FL    MCH 26.2 26.1 - 32.9 PG    MCHC 29.2 (L) 31.4 - 35.0 g/dL    RDW 19.8 (H) 11.9 - 14.6 %    Platelets 214 938 - 616 K/uL    MPV 9.4 9.4 - 12.3 FL    nRBC 0.00 0.0 - 0.2 K/uL    Seg Neutrophils 50 43 - 78 %    Lymphocytes 33 13 - 44 %    Monocytes 15 (H) 4.0 - 12.0 %    Eosinophils % 2 0.5 - 7.8 %    Basophils 0 0.0 - 2.0 %    Immature Granulocytes 0 0.0 - 5.0 %    Segs Absolute 2.4 1.7 - 8.2 K/UL    Absolute Lymph # 1.6 0.5 - 4.6 K/UL    Absolute Mono # 0.7 0.1 - 1.3 K/UL    Absolute Eos # 0.1 0.0 - 0.8 K/UL    Basophils Absolute 0.0 0.0 - 0.2 K/UL    Absolute Immature Granulocyte 0.0 0.0 - 0.5 K/UL    Differential Type AUTOMATED     Comprehensive Metabolic Panel    Collection Time: 12/08/22  8:12 AM   Result Value Ref Range    Sodium 137 133 - 143 mmol/L    Potassium 4.3 3.5 - 5.1 mmol/L    Chloride 105 101 - 110 mmol/L    CO2 26 21 - 32 mmol/L    Anion Gap 6 2 - 11 mmol/L    Glucose 90 65 - 100 mg/dL    BUN 22 6 - 23 MG/DL    Creatinine 0.20 (L) 0.6 - 1.0 MG/DL    Est, Glom Filt Rate >60 >60 ml/min/1.73m2    Calcium 8.8 8.3 - 10.4 MG/DL    Total Bilirubin 0.3 0.2 - 1.1 MG/DL    ALT 14 12 - 65 U/L    AST 19 15 - 37 U/L    Alk Phosphatase 58 50 - 136 U/L    Total Protein 6.2 (L) 6.3 - 8.2 g/dL    Albumin 2.5 (L) 3.5 - 5.0 g/dL    Globulin 3.7 2.8 - 4.5 g/dL    Albumin/Globulin Ratio 0.7 0.4 - 1.6     Magnesium    Collection Time: 12/08/22  8:12 AM   Result Value Ref Range    Magnesium 2.0 1.8 - 2.4 mg/dL   CEA    Collection Time: 12/08/22  8:12 AM   Result Value Ref Range    CEA 3.2 (H) 0.0 - 3.0 ng/mL   Urinalysis    Collection Time: 12/08/22 10:42 AM   Result Value Ref Range    Color, UA YELLOW Appearance CLOUDY      Specific Gravity, UA 1.025 (H) 1.001 - 1.023      pH, Urine 6.5 5.0 - 9.0      Protein,  (A) NEG mg/dL    Glucose, UA Negative NEG mg/dL    Ketones, Urine Negative NEG mg/dL    Bilirubin Urine Negative NEG      Blood, Urine LARGE (A) NEG      Urobilinogen, Urine 0.2 0.2 - 1.0 EU/dL    Nitrite, Urine Negative NEG      Leukocyte Esterase, Urine MODERATE (A) NEG     Urinalysis, Micro    Collection Time: 12/08/22 10:42 AM   Result Value Ref Range    WBC, UA  0 /hpf    RBC, UA  0 /hpf    Epithelial Cells UA 0-3 0 /hpf    BACTERIA, URINE 2+ (H) 0 /hpf    Casts 0 0 /lpf    Crystals 0 0 /LPF    Mucus, UA 1+ (H) 0 /lpf    Other observations FEW TRANSITIONAL EPITHELIAL CELLS SEEN        Imaging:  No results found for this or any previous visit. ASSESSMENT:   Diagnosis Orders   1. Feeling worried        2. Neoplastic malignant related fatigue        3. Carcinoma of unknown primary ([facility] [address]        4. Encounter for palliative care              PLAN:  Lab studies and imaging studies were personally reviewed. Pertinent old records were reviewed from previous [facility] encounters     Case discussed with Dr. Shirley Herrera    Fatigue: Mildly improved with weight gain, PO intake. She is up around her house more, but still not able to cook, which patient would really like to do. She enjoys TV. Abdominal pain: denies  Nausea: Denies  Mild insomnia due to \"Racing thoughts\": Patient denies anxiety. When she has racing thoughts, she draws from her arlette and prayer helps her. Advanced Care Planning Discussed: None today. Have previously discussed availability of HCPOA here, as well as Let There [facility] Mom introduction. Will follow up in: 2 weeks or earlier if needed    All questions were asked and answered to the best of my ability. In all, I spent 30 minutes in the care of Ms. Earle Becerra today, over 50% of which was in direct counseling and coordination of care.     I have reviewed the patient's controlled substance prescription history, as maintained in the [place] prescription monitoring program, so that the prescriptions(s) for a controlled substance can be given.   Last Date Reviewed: 12/8/22          SCOTT Norman CNP  Outpatient Palliative Care at the  [address]  Office : [phone]  Fax : [phone]

## 2022-12-08 NOTE — PATIENT INSTRUCTIONS
Patient Instructions from Today's Visit    Reason for Visit:  Pre-chemo for GI cancer - Cycle 3 of Cyramza + Taxol     Plan:  Proceed with cycle 3 of chemo today as planned    Continue with your TPN    You can start eating small bites/snacks as you are able to tolerate. We want you to avoid high fat foods, avoid or minimize intake of dairy, and high fiber foods. [place] [facility] will give you an updated handout on foods to try, and foods to avoid.       You have your CT scan on (12/21)     Follow Up:  12/15 for chemo, CT scan, [facility] on (12/22)     Recent Lab Results:  Hospital Outpatient Visit on 12/08/2022   Component Date Value Ref Range Status    WBC 12/08/2022 4.9  4.3 - 11.1 K/uL Final    RBC 12/08/2022 3.36 (A)  4.05 - 5.2 M/uL Final    Hemoglobin 12/08/2022 8.8 (A)  11.7 - 15.4 g/dL Final    Hematocrit 12/08/2022 30.1 (A)  35.8 - 46.3 % Final    MCV 12/08/2022 89.6  82.0 - 102.0 FL Final    MCH 12/08/2022 26.2  26.1 - 32.9 PG Final    MCHC 12/08/2022 29.2 (A)  31.4 - 35.0 g/dL Final    RDW 12/08/2022 19.8 (A)  11.9 - 14.6 % Final    Platelets 05/73/7060 351  150 - 450 K/uL Final    MPV 12/08/2022 9.4  9.4 - 12.3 FL Final    nRBC 12/08/2022 0.00  0.0 - 0.2 K/uL Final    **Note: Absolute NRBC parameter is now reported with Hemogram**    Seg Neutrophils 12/08/2022 50  43 - 78 % Final    Lymphocytes 12/08/2022 33  13 - 44 % Final    Monocytes 12/08/2022 15 (A)  4.0 - 12.0 % Final    Eosinophils % 12/08/2022 2  0.5 - 7.8 % Final    Basophils 12/08/2022 0  0.0 - 2.0 % Final    Immature Granulocytes 12/08/2022 0  0.0 - 5.0 % Final    Segs Absolute 12/08/2022 2.4  1.7 - 8.2 K/UL Final    Absolute Lymph # 12/08/2022 1.6  0.5 - 4.6 K/UL Final    Absolute Mono # 12/08/2022 0.7  0.1 - 1.3 K/UL Final    Absolute Eos # 12/08/2022 0.1  0.0 - 0.8 K/UL Final    Basophils Absolute 12/08/2022 0.0  0.0 - 0.2 K/UL Final    Absolute Immature Granulocyte 12/08/2022 0.0  0.0 - 0.5 K/UL Final    Differential Type 12/08/2022 AUTOMATED Final    Sodium 12/08/2022 137  133 - 143 mmol/L Final    Potassium 12/08/2022 4.3  3.5 - 5.1 mmol/L Final    Chloride 12/08/2022 105  101 - 110 mmol/L Final    CO2 12/08/2022 26  21 - 32 mmol/L Final    Anion Gap 12/08/2022 6  2 - 11 mmol/L Final    Glucose 12/08/2022 90  65 - 100 mg/dL Final    BUN 12/08/2022 22  6 - 23 MG/DL Final    Creatinine 12/08/2022 0.20 (A)  0.6 - 1.0 MG/DL Final    Est, Glom Filt Rate 12/08/2022 >60  >60 ml/min/1.73m2 Final    Comment:      Pediatric calculator link: Ryan.at. org/professionals/kdoqi/gfr_calculatorped       These results are not intended for use in patients <25years of age. eGFR results are calculated without a race factor using  the 2021 CKD-EPI equation. Careful clinical correlation is recommended, particularly when comparing to results calculated using previous equations. The CKD-EPI equation is less accurate in patients with extremes of muscle mass, extra-renal metabolism of creatinine, excessive creatine ingestion, or following therapy that affects renal tubular secretion. Calcium 12/08/2022 8.8  8.3 - 10.4 MG/DL Final    Total Bilirubin 12/08/2022 0.3  0.2 - 1.1 MG/DL Final    ALT 12/08/2022 14  12 - 65 U/L Final    AST 12/08/2022 19  15 - 37 U/L Final    Alk Phosphatase 12/08/2022 58  50 - 136 U/L Final    Total Protein 12/08/2022 6.2 (A)  6.3 - 8.2 g/dL Final    Albumin 12/08/2022 2.5 (A)  3.5 - 5.0 g/dL Final    Globulin 12/08/2022 3.7  2.8 - 4.5 g/dL Final    Albumin/Globulin Ratio 12/08/2022 0.7  0.4 - 1.6   Final    Magnesium 12/08/2022 2.0  1.8 - 2.4 mg/dL Final    CEA 12/08/2022 3.2 (A)  0.0 - 3.0 ng/mL Final    Comment: Nonsmoker:  <3.0 ng/mL  Smoker:     <5.0 ng/mL  [employer]. Patient's results of tumor marker testing may not be comparable to labs using different manufacturers/methods.            Treatment Summary has been discussed and given to patient: NA        -------------------------------------------------------------------------------------------------------------------  Please call our office at [phone] if you have any  of the following symptoms:   Fever of 100.5 or greater  Chills  Shortness of breath  Swelling or pain in one leg    After office hours an answering service is available and will contact a provider for emergencies or if you are experiencing any of the above symptoms. Patient has My Chart. My Chart log in information explained on the after visit summary printout at the [facility] [address] desk.     Eduardo Ribeiro RN

## 2022-12-09 NOTE — PROGRESS NOTES
Nutrition F/U:  Assessment:  Pt seen during office visit w/ Dr. Elijah Bennett, starting cycle 3 of Cyramza + Taxol. Pt remains TPN dependent for estimated nutrition needs, infusing over 14 hrs/day now, output from abdominal pleurx has decreased significantly - reports no draining x 2 weeks and ~250 ml output. Pt reports improved ability to swallow, has had some soft solids, c/o diarrhea after all intake - instructed to not take anti-diarrheals and follow low fiber/limited lactose diet, small meals/snacks. Current BW: 108#, up 5# over the past month. Intervention:  1. Continue w/ Low fiber/limited lactose diet, small meals/snacks as tolerated. Provided new education handout on therapeutic diet. 2. TPN per [employer]     Monitoring/Evaluation:  1. RD to follow up during next office visit - follow up wt status, tolerance/intake of po diet, symptom management.       [address] [place] [address], [address], [address] [facility]

## 2022-12-11 DIAGNOSIS — C80.1 METASTASIS TO PERITONEUM OF UNKNOWN PRIMARY (HCC): Primary | ICD-10-CM

## 2022-12-11 DIAGNOSIS — C78.6 METASTASIS TO PERITONEUM OF UNKNOWN PRIMARY (HCC): Primary | ICD-10-CM

## 2022-12-13 ENCOUNTER — TELEPHONE (OUTPATIENT)
Dept: ONCOLOGY | Age: 47
End: 2022-12-13

## 2022-12-13 NOTE — TELEPHONE ENCOUNTER
Received call from [facility] with [facility] & she state they need several unsigned orders from Dr. Rebecca Hagen & Dr. Pranav Ely that needs to be signed.  [facility] can be reached at [phone] & Fax#[phone]

## 2022-12-14 RX ORDER — SODIUM CHLORIDE 0.9 % (FLUSH) 0.9 %
10 SYRINGE (ML) INJECTION PRN
OUTPATIENT
Start: 2022-12-14

## 2022-12-14 NOTE — TELEPHONE ENCOUNTER
Called back to Yazan Lees. Supplied traige fax number to send prders over.  Will watch for them and get them to appropriate MD.

## 2022-12-15 ENCOUNTER — HOSPITAL ENCOUNTER (OUTPATIENT)
Dept: INFUSION THERAPY | Age: 47
Discharge: HOME | End: 2022-12-15
Payer: COMMERCIAL

## 2022-12-15 VITALS
WEIGHT: 109 LBS | RESPIRATION RATE: 18 BRPM | HEART RATE: 122 BPM | DIASTOLIC BLOOD PRESSURE: 83 MMHG | SYSTOLIC BLOOD PRESSURE: 119 MMHG | TEMPERATURE: 99 F | BODY MASS INDEX: 18.71 KG/M2 | OXYGEN SATURATION: 100 %

## 2022-12-15 DIAGNOSIS — C80.1 METASTASIS TO PERITONEUM OF UNKNOWN PRIMARY (HCC): ICD-10-CM

## 2022-12-15 DIAGNOSIS — C78.6 METASTASIS TO PERITONEUM OF UNKNOWN PRIMARY (HCC): ICD-10-CM

## 2022-12-15 DIAGNOSIS — C80.1 CARCINOMA OF UNKNOWN PRIMARY (HCC): ICD-10-CM

## 2022-12-15 DIAGNOSIS — C80.1 CARCINOMA OF UNKNOWN PRIMARY (HCC): Primary | ICD-10-CM

## 2022-12-15 LAB
ALBUMIN SERPL-MCNC: 2.6 G/DL (ref 3.5–5)
ALBUMIN/GLOB SERPL: 0.7 {RATIO} (ref 0.4–1.6)
ALP SERPL-CCNC: 62 U/L (ref 50–136)
ALT SERPL-CCNC: 17 U/L (ref 12–65)
ANION GAP SERPL CALC-SCNC: 6 MMOL/L (ref 2–11)
AST SERPL-CCNC: 19 U/L (ref 15–37)
BASOPHILS # BLD: 0 K/UL (ref 0–0.2)
BASOPHILS NFR BLD: 1 % (ref 0–2)
BILIRUB SERPL-MCNC: 0.3 MG/DL (ref 0.2–1.1)
BUN SERPL-MCNC: 21 MG/DL (ref 6–23)
CALCIUM SERPL-MCNC: 8.7 MG/DL (ref 8.3–10.4)
CHLORIDE SERPL-SCNC: 104 MMOL/L (ref 101–110)
CO2 SERPL-SCNC: 27 MMOL/L (ref 21–32)
CREAT SERPL-MCNC: 0.4 MG/DL (ref 0.6–1)
DIFFERENTIAL METHOD BLD: ABNORMAL
EOSINOPHIL # BLD: 0.2 K/UL (ref 0–0.8)
EOSINOPHIL NFR BLD: 3 % (ref 0.5–7.8)
ERYTHROCYTE [DISTWIDTH] IN BLOOD BY AUTOMATED COUNT: 18.9 % (ref 11.9–14.6)
GLOBULIN SER CALC-MCNC: 4 G/DL (ref 2.8–4.5)
GLUCOSE SERPL-MCNC: 103 MG/DL (ref 65–100)
HCT VFR BLD AUTO: 30 % (ref 35.8–46.3)
HGB BLD-MCNC: 8.9 G/DL (ref 11.7–15.4)
IMM GRANULOCYTES # BLD AUTO: 0.2 K/UL (ref 0–0.5)
IMM GRANULOCYTES NFR BLD AUTO: 4 % (ref 0–5)
LYMPHOCYTES # BLD: 1.6 K/UL (ref 0.5–4.6)
LYMPHOCYTES NFR BLD: 29 % (ref 13–44)
MCH RBC QN AUTO: 26.2 PG (ref 26.1–32.9)
MCHC RBC AUTO-ENTMCNC: 29.7 G/DL (ref 31.4–35)
MCV RBC AUTO: 88.2 FL (ref 82–102)
MONOCYTES # BLD: 0.3 K/UL (ref 0.1–1.3)
MONOCYTES NFR BLD: 5 % (ref 4–12)
NEUTS SEG # BLD: 3.3 K/UL (ref 1.7–8.2)
NEUTS SEG NFR BLD: 59 % (ref 43–78)
NRBC # BLD: 0.09 K/UL (ref 0–0.2)
PLATELET # BLD AUTO: 373 K/UL (ref 150–450)
PMV BLD AUTO: 10.2 FL (ref 9.4–12.3)
POTASSIUM SERPL-SCNC: 4 MMOL/L (ref 3.5–5.1)
PROT SERPL-MCNC: 6.6 G/DL (ref 6.3–8.2)
RBC # BLD AUTO: 3.4 M/UL (ref 4.05–5.2)
SODIUM SERPL-SCNC: 137 MMOL/L (ref 133–143)
WBC # BLD AUTO: 5.6 K/UL (ref 4.3–11.1)

## 2022-12-15 PROCEDURE — 85025 COMPLETE CBC W/AUTO DIFF WBC: CPT

## 2022-12-15 PROCEDURE — A4216 STERILE WATER/SALINE, 10 ML: HCPCS | Performed by: INTERNAL MEDICINE

## 2022-12-15 PROCEDURE — 96375 TX/PRO/DX INJ NEW DRUG ADDON: CPT

## 2022-12-15 PROCEDURE — 6360000002 HC RX W HCPCS: Performed by: INTERNAL MEDICINE

## 2022-12-15 PROCEDURE — 80053 COMPREHEN METABOLIC PANEL: CPT

## 2022-12-15 PROCEDURE — 96413 CHEMO IV INFUSION 1 HR: CPT

## 2022-12-15 PROCEDURE — 2500000003 HC RX 250 WO HCPCS: Performed by: INTERNAL MEDICINE

## 2022-12-15 PROCEDURE — 2580000003 HC RX 258: Performed by: INTERNAL MEDICINE

## 2022-12-15 RX ORDER — SODIUM CHLORIDE 0.9 % (FLUSH) 0.9 %
5-40 SYRINGE (ML) INJECTION PRN
Status: DISCONTINUED | OUTPATIENT
Start: 2022-12-15 | End: 2022-12-16 | Stop reason: HOSPADM

## 2022-12-15 RX ORDER — SODIUM CHLORIDE 9 MG/ML
5-250 INJECTION, SOLUTION INTRAVENOUS PRN
Status: DISCONTINUED | OUTPATIENT
Start: 2022-12-15 | End: 2022-12-16 | Stop reason: HOSPADM

## 2022-12-15 RX ORDER — DIPHENHYDRAMINE HYDROCHLORIDE 50 MG/ML
50 INJECTION INTRAMUSCULAR; INTRAVENOUS ONCE
Status: COMPLETED | OUTPATIENT
Start: 2022-12-15 | End: 2022-12-15

## 2022-12-15 RX ORDER — ONDANSETRON 2 MG/ML
8 INJECTION INTRAMUSCULAR; INTRAVENOUS ONCE
Status: COMPLETED | OUTPATIENT
Start: 2022-12-15 | End: 2022-12-15

## 2022-12-15 RX ORDER — DEXAMETHASONE SODIUM PHOSPHATE 10 MG/ML
10 INJECTION INTRAMUSCULAR; INTRAVENOUS ONCE
Status: COMPLETED | OUTPATIENT
Start: 2022-12-15 | End: 2022-12-15

## 2022-12-15 RX ADMIN — SODIUM CHLORIDE, PRESERVATIVE FREE 10 ML: 5 INJECTION INTRAVENOUS at 11:00

## 2022-12-15 RX ADMIN — SODIUM CHLORIDE 25 ML/HR: 9 INJECTION, SOLUTION INTRAVENOUS at 11:00

## 2022-12-15 RX ADMIN — FAMOTIDINE 20 MG: 10 INJECTION, SOLUTION INTRAVENOUS at 11:00

## 2022-12-15 RX ADMIN — PACLITAXEL 120 MG: 6 INJECTION, SOLUTION, CONCENTRATE INTRAVENOUS at 11:38

## 2022-12-15 RX ADMIN — DEXAMETHASONE SODIUM PHOSPHATE 10 MG: 10 INJECTION INTRAMUSCULAR; INTRAVENOUS at 11:04

## 2022-12-15 RX ADMIN — DIPHENHYDRAMINE HYDROCHLORIDE 50 MG: 50 INJECTION, SOLUTION INTRAMUSCULAR; INTRAVENOUS at 11:02

## 2022-12-15 RX ADMIN — ONDANSETRON 8 MG: 2 INJECTION INTRAMUSCULAR; INTRAVENOUS at 10:58

## 2022-12-15 NOTE — PROGRESS NOTES
Arrived to the [facility]. Taxol infusion completed. Patient tolerated well. Any issues or concerns during appointment: no.  Patient aware of next infusion appointment on 12/22/2022 (date) at 80 (time). Patient aware of next lab and [facility] office visit on 12/22/2022 (date) at 56 (time). Patient instructed to call provider with temperature of 100.4 or greater or nausea/vomiting/ diarrhea or pain not controlled by medications  Discharged ambulatory.

## 2022-12-16 NOTE — TELEPHONE ENCOUNTER
No fax has been received.  Call back to [facility] to notify we havent gotten anything yet and requested they try again minimum assist (75% patients effort)

## 2022-12-21 ENCOUNTER — HOSPITAL ENCOUNTER (OUTPATIENT)
Dept: CT IMAGING | Age: 47
Discharge: HOME | End: 2022-12-24
Payer: COMMERCIAL

## 2022-12-21 DIAGNOSIS — C78.6 METASTASIS TO PERITONEUM OF UNKNOWN PRIMARY (HCC): ICD-10-CM

## 2022-12-21 DIAGNOSIS — C80.1 METASTASIS TO PERITONEUM OF UNKNOWN PRIMARY (HCC): ICD-10-CM

## 2022-12-21 PROCEDURE — 74177 CT ABD & PELVIS W/CONTRAST: CPT

## 2022-12-21 PROCEDURE — 6360000004 HC RX CONTRAST MEDICATION: Performed by: INTERNAL MEDICINE

## 2022-12-21 PROCEDURE — 2580000003 HC RX 258: Performed by: INTERNAL MEDICINE

## 2022-12-21 RX ORDER — 0.9 % SODIUM CHLORIDE 0.9 %
100 INTRAVENOUS SOLUTION INTRAVENOUS
Status: COMPLETED | OUTPATIENT
Start: 2022-12-21 | End: 2022-12-21

## 2022-12-21 RX ORDER — SODIUM CHLORIDE 0.9 % (FLUSH) 0.9 %
10 SYRINGE (ML) INJECTION
Status: COMPLETED | OUTPATIENT
Start: 2022-12-21 | End: 2022-12-21

## 2022-12-21 RX ADMIN — SODIUM CHLORIDE 100 ML: 9 INJECTION, SOLUTION INTRAVENOUS at 11:04

## 2022-12-21 RX ADMIN — SODIUM CHLORIDE, PRESERVATIVE FREE 10 ML: 5 INJECTION INTRAVENOUS at 11:04

## 2022-12-21 RX ADMIN — IOPAMIDOL 100 ML: 755 INJECTION, SOLUTION INTRAVENOUS at 11:04

## 2022-12-22 ENCOUNTER — OFFICE VISIT (OUTPATIENT)
Dept: ONCOLOGY | Age: 47
End: 2022-12-22
Payer: COMMERCIAL

## 2022-12-22 ENCOUNTER — OFFICE VISIT (OUTPATIENT)
Dept: ONCOLOGY | Age: 47
End: 2022-12-22

## 2022-12-22 ENCOUNTER — HOSPITAL ENCOUNTER (OUTPATIENT)
Dept: INFUSION THERAPY | Age: 47
Discharge: HOME | End: 2022-12-22
Payer: COMMERCIAL

## 2022-12-22 ENCOUNTER — OFFICE VISIT (OUTPATIENT)
Dept: PALLATIVE CARE | Age: 47
End: 2022-12-22
Payer: COMMERCIAL

## 2022-12-22 VITALS
HEART RATE: 97 BPM | TEMPERATURE: 98.4 F | RESPIRATION RATE: 14 BRPM | OXYGEN SATURATION: 100 % | HEIGHT: 64 IN | DIASTOLIC BLOOD PRESSURE: 84 MMHG | SYSTOLIC BLOOD PRESSURE: 110 MMHG | BODY MASS INDEX: 18.66 KG/M2 | WEIGHT: 109.3 LBS

## 2022-12-22 DIAGNOSIS — Z00.8 NUTRITIONAL ASSESSMENT: Primary | ICD-10-CM

## 2022-12-22 DIAGNOSIS — C80.1 METASTASIS TO PERITONEUM OF UNKNOWN PRIMARY (HCC): Primary | ICD-10-CM

## 2022-12-22 DIAGNOSIS — R53.0 NEOPLASTIC MALIGNANT RELATED FATIGUE: Primary | ICD-10-CM

## 2022-12-22 DIAGNOSIS — C80.1 METASTASIS TO PERITONEUM OF UNKNOWN PRIMARY (HCC): ICD-10-CM

## 2022-12-22 DIAGNOSIS — C80.1 CARCINOMA OF UNKNOWN PRIMARY (HCC): ICD-10-CM

## 2022-12-22 DIAGNOSIS — C79.9 METASTATIC CARCINOMA (HCC): ICD-10-CM

## 2022-12-22 DIAGNOSIS — C78.6 METASTASIS TO PERITONEUM OF UNKNOWN PRIMARY (HCC): Primary | ICD-10-CM

## 2022-12-22 DIAGNOSIS — C78.6 METASTASIS TO PERITONEUM OF UNKNOWN PRIMARY (HCC): ICD-10-CM

## 2022-12-22 DIAGNOSIS — Z51.5 ENCOUNTER FOR PALLIATIVE CARE: ICD-10-CM

## 2022-12-22 LAB
ALBUMIN SERPL-MCNC: 2.7 G/DL (ref 3.5–5)
ALBUMIN/GLOB SERPL: 0.7 {RATIO} (ref 0.4–1.6)
ALP SERPL-CCNC: 57 U/L (ref 50–136)
ALT SERPL-CCNC: 20 U/L (ref 12–65)
ANION GAP SERPL CALC-SCNC: 3 MMOL/L (ref 2–11)
APPEARANCE UR: ABNORMAL
AST SERPL-CCNC: 21 U/L (ref 15–37)
BACTERIA URNS QL MICRO: ABNORMAL /HPF
BASOPHILS # BLD: 0 K/UL (ref 0–0.2)
BASOPHILS NFR BLD: 1 % (ref 0–2)
BILIRUB SERPL-MCNC: 0.2 MG/DL (ref 0.2–1.1)
BILIRUB UR QL: NEGATIVE
BUN SERPL-MCNC: 18 MG/DL (ref 6–23)
CALCIUM SERPL-MCNC: 8.8 MG/DL (ref 8.3–10.4)
CASTS URNS QL MICRO: 0 /LPF
CEA SERPL-MCNC: 3.3 NG/ML (ref 0–3)
CHLORIDE SERPL-SCNC: 105 MMOL/L (ref 101–110)
CO2 SERPL-SCNC: 29 MMOL/L (ref 21–32)
COLOR UR: ABNORMAL
CREAT SERPL-MCNC: 0.4 MG/DL (ref 0.6–1)
CREAT UR-MCNC: 32 MG/DL
CRYSTALS URNS QL MICRO: 0 /LPF
DIFFERENTIAL METHOD BLD: ABNORMAL
EOSINOPHIL # BLD: 0.1 K/UL (ref 0–0.8)
EOSINOPHIL NFR BLD: 3 % (ref 0.5–7.8)
EPI CELLS #/AREA URNS HPF: ABNORMAL /HPF
ERYTHROCYTE [DISTWIDTH] IN BLOOD BY AUTOMATED COUNT: 19 % (ref 11.9–14.6)
GLOBULIN SER CALC-MCNC: 4 G/DL (ref 2.8–4.5)
GLUCOSE SERPL-MCNC: 91 MG/DL (ref 65–100)
GLUCOSE UR STRIP.AUTO-MCNC: NEGATIVE MG/DL
HCT VFR BLD AUTO: 27.6 % (ref 35.8–46.3)
HGB BLD-MCNC: 8.3 G/DL (ref 11.7–15.4)
HGB UR QL STRIP: ABNORMAL
IMM GRANULOCYTES # BLD AUTO: 0 K/UL (ref 0–0.5)
IMM GRANULOCYTES NFR BLD AUTO: 1 % (ref 0–5)
KETONES UR QL STRIP.AUTO: NEGATIVE MG/DL
LEUKOCYTE ESTERASE UR QL STRIP.AUTO: ABNORMAL
LYMPHOCYTES # BLD: 1.5 K/UL (ref 0.5–4.6)
LYMPHOCYTES NFR BLD: 40 % (ref 13–44)
MAGNESIUM SERPL-MCNC: 2.2 MG/DL (ref 1.8–2.4)
MCH RBC QN AUTO: 26.8 PG (ref 26.1–32.9)
MCHC RBC AUTO-ENTMCNC: 30.1 G/DL (ref 31.4–35)
MCV RBC AUTO: 89 FL (ref 82–102)
MONOCYTES # BLD: 0.2 K/UL (ref 0.1–1.3)
MONOCYTES NFR BLD: 5 % (ref 4–12)
MUCOUS THREADS URNS QL MICRO: 0 /LPF
NEUTS SEG # BLD: 1.9 K/UL (ref 1.7–8.2)
NEUTS SEG NFR BLD: 51 % (ref 43–78)
NITRITE UR QL STRIP.AUTO: NEGATIVE
NRBC # BLD: 0.04 K/UL (ref 0–0.2)
PH UR STRIP: 7.5 [PH] (ref 5–9)
PLATELET # BLD AUTO: 332 K/UL (ref 150–450)
PMV BLD AUTO: 10.5 FL (ref 9.4–12.3)
POTASSIUM SERPL-SCNC: 4.3 MMOL/L (ref 3.5–5.1)
PROT SERPL-MCNC: 6.7 G/DL (ref 6.3–8.2)
PROT UR STRIP-MCNC: 100 MG/DL
PROT UR-MCNC: 72 MG/DL
PROT/CREAT UR-RTO: 2.3
RBC # BLD AUTO: 3.1 M/UL (ref 4.05–5.2)
RBC #/AREA URNS HPF: >100 /HPF
SODIUM SERPL-SCNC: 137 MMOL/L (ref 133–143)
SP GR UR REFRACTOMETRY: 1.02 (ref 1–1.02)
UROBILINOGEN UR QL STRIP.AUTO: 0.2 EU/DL (ref 0.2–1)
WBC # BLD AUTO: 3.8 K/UL (ref 4.3–11.1)
WBC URNS QL MICRO: ABNORMAL /HPF

## 2022-12-22 PROCEDURE — 99213 OFFICE O/P EST LOW 20 MIN: CPT | Performed by: NURSE PRACTITIONER

## 2022-12-22 PROCEDURE — A4216 STERILE WATER/SALINE, 10 ML: HCPCS | Performed by: INTERNAL MEDICINE

## 2022-12-22 PROCEDURE — 85025 COMPLETE CBC W/AUTO DIFF WBC: CPT

## 2022-12-22 PROCEDURE — 81001 URINALYSIS AUTO W/SCOPE: CPT

## 2022-12-22 PROCEDURE — 2500000003 HC RX 250 WO HCPCS: Performed by: INTERNAL MEDICINE

## 2022-12-22 PROCEDURE — 96375 TX/PRO/DX INJ NEW DRUG ADDON: CPT

## 2022-12-22 PROCEDURE — 82378 CARCINOEMBRYONIC ANTIGEN: CPT

## 2022-12-22 PROCEDURE — 36591 DRAW BLOOD OFF VENOUS DEVICE: CPT

## 2022-12-22 PROCEDURE — 99214 OFFICE O/P EST MOD 30 MIN: CPT | Performed by: INTERNAL MEDICINE

## 2022-12-22 PROCEDURE — 80053 COMPREHEN METABOLIC PANEL: CPT

## 2022-12-22 PROCEDURE — 83735 ASSAY OF MAGNESIUM: CPT

## 2022-12-22 PROCEDURE — 96413 CHEMO IV INFUSION 1 HR: CPT

## 2022-12-22 PROCEDURE — 2580000003 HC RX 258: Performed by: INTERNAL MEDICINE

## 2022-12-22 PROCEDURE — 82570 ASSAY OF URINE CREATININE: CPT

## 2022-12-22 PROCEDURE — 6360000002 HC RX W HCPCS: Performed by: INTERNAL MEDICINE

## 2022-12-22 RX ORDER — SODIUM CHLORIDE 9 MG/ML
5-250 INJECTION, SOLUTION INTRAVENOUS PRN
Status: DISCONTINUED | OUTPATIENT
Start: 2022-12-22 | End: 2022-12-23 | Stop reason: HOSPADM

## 2022-12-22 RX ORDER — SODIUM CHLORIDE 0.9 % (FLUSH) 0.9 %
5-40 SYRINGE (ML) INJECTION PRN
Status: DISCONTINUED | OUTPATIENT
Start: 2022-12-22 | End: 2022-12-23 | Stop reason: HOSPADM

## 2022-12-22 RX ORDER — ONDANSETRON 2 MG/ML
8 INJECTION INTRAMUSCULAR; INTRAVENOUS ONCE
Status: COMPLETED | OUTPATIENT
Start: 2022-12-22 | End: 2022-12-22

## 2022-12-22 RX ORDER — SODIUM CHLORIDE 0.9 % (FLUSH) 0.9 %
10 SYRINGE (ML) INJECTION PRN
Status: DISCONTINUED | OUTPATIENT
Start: 2022-12-22 | End: 2022-12-23 | Stop reason: HOSPADM

## 2022-12-22 RX ORDER — DEXAMETHASONE SODIUM PHOSPHATE 10 MG/ML
10 INJECTION INTRAMUSCULAR; INTRAVENOUS ONCE
Status: COMPLETED | OUTPATIENT
Start: 2022-12-22 | End: 2022-12-22

## 2022-12-22 RX ORDER — DIPHENHYDRAMINE HYDROCHLORIDE 50 MG/ML
50 INJECTION INTRAMUSCULAR; INTRAVENOUS ONCE
Status: COMPLETED | OUTPATIENT
Start: 2022-12-22 | End: 2022-12-22

## 2022-12-22 RX ADMIN — ONDANSETRON 8 MG: 2 INJECTION INTRAMUSCULAR; INTRAVENOUS at 12:21

## 2022-12-22 RX ADMIN — SODIUM CHLORIDE, PRESERVATIVE FREE 10 ML: 5 INJECTION INTRAVENOUS at 12:16

## 2022-12-22 RX ADMIN — FAMOTIDINE 20 MG: 10 INJECTION, SOLUTION INTRAVENOUS at 12:18

## 2022-12-22 RX ADMIN — DEXAMETHASONE SODIUM PHOSPHATE 10 MG: 10 INJECTION INTRAMUSCULAR; INTRAVENOUS at 12:25

## 2022-12-22 RX ADMIN — SODIUM CHLORIDE 20 ML/HR: 9 INJECTION, SOLUTION INTRAVENOUS at 12:17

## 2022-12-22 RX ADMIN — PACLITAXEL 120 MG: 6 INJECTION, SOLUTION INTRAVENOUS at 13:05

## 2022-12-22 RX ADMIN — DIPHENHYDRAMINE HYDROCHLORIDE 50 MG: 50 INJECTION, SOLUTION INTRAMUSCULAR; INTRAVENOUS at 12:23

## 2022-12-22 RX ADMIN — SODIUM CHLORIDE, PRESERVATIVE FREE 10 ML: 5 INJECTION INTRAVENOUS at 10:00

## 2022-12-22 ASSESSMENT — ENCOUNTER SYMPTOMS
ABDOMINAL PAIN: 0
NAUSEA: 1

## 2022-12-22 NOTE — PROGRESS NOTES
Arrived to the [facility] by wheelchair. C3D15 Taxol completed. Patient tolerated well. Per Dr. Sandrita Galvan order, patient did not receive Rukhsanaben Walter today to give her kidneys a break. Planning to restart next visit  Any issues or concerns during appointment: none. Patient aware of next infusion appointment on 1/5/23 (date) at 0830 (time). Patient aware of next lab and [facility] office visit on 1/5/23 (date) at [address] [facility] (time). Patient instructed to call provider with temperature of 100.4 or greater or nausea/vomiting/ diarrhea or pain not controlled by medications  Discharged home by wheelchair with mother.

## 2022-12-22 NOTE — PATIENT INSTRUCTIONS
Potassium 12/22/2022 4.3  3.5 - 5.1 mmol/L Final    Chloride 12/22/2022 105  101 - 110 mmol/L Final    CO2 12/22/2022 29  21 - 32 mmol/L Final    Anion Gap 12/22/2022 3  2 - 11 mmol/L Final    Glucose 12/22/2022 91  65 - 100 mg/dL Final    BUN 12/22/2022 18  6 - 23 MG/DL Final    Creatinine 12/22/2022 0.40 (A)  0.6 - 1.0 MG/DL Final    Est, Glom Filt Rate 12/22/2022 >60  >60 ml/min/1.73m2 Final    Comment:      Pediatric calculator link: [employer]. org/professionals/kdoqi/gfr_calculatorped       These results are not intended for use in patients <25years of age. eGFR results are calculated without a race factor using  the 2021 CKD-EPI equation. Careful clinical correlation is recommended, particularly when comparing to results calculated using previous equations. The CKD-EPI equation is less accurate in patients with extremes of muscle mass, extra-renal metabolism of creatinine, excessive creatine ingestion, or following therapy that affects renal tubular secretion.       Calcium 12/22/2022 8.8  8.3 - 10.4 MG/DL Final    Total Bilirubin 12/22/2022 0.2  0.2 - 1.1 MG/DL Final    ALT 12/22/2022 20  12 - 65 U/L Final    AST 12/22/2022 21  15 - 37 U/L Final    Alk Phosphatase 12/22/2022 57  50 - 136 U/L Final    Total Protein 12/22/2022 6.7  6.3 - 8.2 g/dL Final    Albumin 12/22/2022 2.7 (A)  3.5 - 5.0 g/dL Final    Globulin 12/22/2022 4.0  2.8 - 4.5 g/dL Final    Albumin/Globulin Ratio 12/22/2022 0.7  0.4 - 1.6   Final    Color, UA 12/22/2022 RED    Final    Appearance 12/22/2022 TURBID    Final    Specific Gravity, UA 12/22/2022 1.025 (A)  1.001 - 1.023   Final    pH, Urine 12/22/2022 7.5  5.0 - 9.0   Final    Protein, UA 12/22/2022 100 (A)  NEG mg/dL Final    Glucose, UA 12/22/2022 Negative  NEG mg/dL Final    Ketones, Urine 12/22/2022 Negative  NEG mg/dL Final    Bilirubin Urine 12/22/2022 Negative  NEG   Final    Blood, Urine 12/22/2022 LARGE (A)  NEG   Final    Urobilinogen, Urine 12/22/2022 0.2  0.2 - 1.0 EU/dL Final    Nitrite, Urine 12/22/2022 Negative  NEG   Final    Leukocyte Esterase, Urine 12/22/2022 MODERATE (A)  NEG   Final    CEA 12/22/2022 3.3 (A)  0.0 - 3.0 ng/mL Final    Comment: Nonsmoker:  <3.0 ng/mL  Smoker:     <5.0 ng/mL  [employer]. Patient's results of tumor marker testing may not be comparable to labs using different manufacturers/methods. Magnesium 12/22/2022 2.2  1.8 - 2.4 mg/dL Final    WBC, UA 12/22/2022 5-10  0 /hpf Final    RBC, UA 12/22/2022 >100  0 /hpf Final    Epithelial Cells UA 12/22/2022 0-3  0 /hpf Final    BACTERIA, URINE 12/22/2022 1+ (A)  0 /hpf Final    Casts 12/22/2022 0  0 /lpf Final    Crystals 12/22/2022 0  0 /LPF Final    Mucus, UA 12/22/2022 0  0 /lpf Final         Treatment Summary has been discussed and given to patient: NA        -------------------------------------------------------------------------------------------------------------------  Please call our office at [phone] if you have any  of the following symptoms:   Fever of 100.5 or greater  Chills  Shortness of breath  Swelling or pain in one leg    After office hours an answering service is available and will contact a provider for emergencies or if you are experiencing any of the above symptoms. Patient has My Chart. My Chart log in information explained on the after visit summary printout at the [facility] desk.     Gely Siegel RN

## 2022-12-22 NOTE — PROGRESS NOTES
HEMATOLOGY/ONCOLOGY FOLLOWUP  VISIT      Patient Name: Roxie Galindo             Date of Visit: 2022  : 1975  Age:47 y.o. Presenting Complaint:  Roxie Galindo  is seen in follow-up for carcinoma of unknown primary. History of Present Illness:  Ms. Veronique Watkins was seen for the first time in our office in 2022. She was a woman of previously good health who began noticing left-sided back discomfort in early 2022. This was associated  ultimately with a sense of difficulty swallowing and ultimately she presented to [facility] for evaluation. Her symptoms were felt to potentially be indicative of a bowel obstruction and she was sent for a CT scan. This study, performed on 2022  was notable for a linear calcific density along the course of the proximal left ureter with associated moderate hydronephrosis potentially indicative of an intraureteral calculus. There was also stranding throughout the retroperitoneal soft tissues anterior  to the left psoas muscle with pelvic ascites. There was also wall thickening involving the descending colon. The question of colitis or serosal implants was raised. The patient had undergone a gastric sleeve placement several years prior. She had  also undergone a negative colonoscopy about 3 years prior to these presentations. There was a history of anemia ultimately resulting in the performance of a hysterectomy approximately 3 years ago for menorrhagia. Because of the CT scan findings, she  was ultimately referred to Dr. Steffany Velez for evaluation of a possible pelvic malignancy. On 2022 he performed a laparoscopy and biopsy with evidence of peritoneal carcinomatosis grossly. A \"peritoneal nodule\" and a \"peritoneal implant\"  were both positive for a poorly differentiated metastatic carcinoma potentially consistent with an upper gastrointestinal primary.   An omental biopsy showed no evidence of malignancy. Immunohistochemistries were positive for cytokeratin 7 and negative  for cytokeratin 20. The tumor was weakly positive for CDX2. The only other positive study was MOC-31. Testing through [employer] demonstrated no mutation and ERB-B2. There was no microsatellite instability and no mismatch repair protein deficiencies. There were no targetable lesions. A PET scan was subsequently performed on  showing elevated FDG uptake in the left pelvis corresponding with a masslike density concerning  for peritoneal carcinomatosis. There was a small volume of free fluid within the peritoneal cavity. There was moderate right hydroureteronephrosis. Despite the pathologic findings, there was no evidence of FDG activity in the upper gastrointestinal  tract. CA-125 was slightly elevated at 44. Given the uncertainty of her diagnosis, the patient went back for additional surgery on February 15 at which time she underwent bilateral ureteral stent placement and bilateral salpingo-oophorectomies. Pathology  from that surgery was notable for poorly differentiated carcinoma with occasional signet ring features. Immunohistochemical stains were most consistent with a tumor of the upper gastrointestinal tract. She is  1 para 1 abortus 0. There is no  family history of cancer. She has had no difficulties with vomiting. She still notes that some food traverses her esophagus slowly. She had undergone a prior dilatation without any evidence of cancer. She subsequently began treatment with FOLFOX ultimately  with the addition of bevacizumab in 2022. She ultimately received 8 cycles of FOLFOX most of them with Avastin. On , she was taken to surgery for evaluation of debulking and HIPEC. Unfortunately, at the time of surgery her tumor was found to be tightly adherent and impossible to debulk.   Despite the FOLFOX, she developed progressive ascites necessitating placement of a Pleurx catheter. Based on this symptomatic progression, a decision was made to place her on paclitaxel and ramucirumab. In October, she was hospitalized for recurrent nausea and vomiting. She was placed on intravenous fluids and seen by gastroenterology. She underwent an esophageal dilatation as part of her EGD. Most importantly however a decision was made to place her on TPN. She returns today for follow-up. She continues to appear to be improved relative to her appearance of the initiation of therapy. She remains on TPN but has been able to swallow certain simple foods without recurrent nausea or vomiting. She underwent a CT scan prior to today's visit which showed no radiographic change although clearly from a subjective perspective she is doing better. She has gained small amount of weight and as noted is able to swallow some foods without immediate vomiting. She is tolerating the therapy well. She does have evidence of hydronephrosis on the scans although her creatinine remains low. Her urinalysis is chronically full of debris. Previously, she had been draining her ascites once every other day. Now at most, she is draining it every 7 to 10 days.    Medications:   Current Outpatient Medications   Medication Sig Dispense Refill    pantoprazole (PROTONIX) 40 MG tablet Take 1 tablet by mouth in the morning and at bedtime 60 tablet 0    folic acid (FOLVITE) 1 MG tablet Take 1 tablet by mouth daily 30 tablet 0    metoclopramide (REGLAN) 5 MG tablet Take 1 tablet by mouth 3 times daily (Patient taking differently: Take 5 mg by mouth in the morning and at bedtime) 90 tablet 0    sennosides-docusate sodium (SENOKOT-S) 8.6-50 MG tablet Take 2 tablets by mouth daily as needed for Constipation 90 tablet 0    ondansetron (ZOFRAN) 8 MG tablet Take 8 mg by mouth every 8 hours as needed      lactulose (CHRONULAC) 10 GM/15ML solution Take 30 mLs by mouth 2 times daily as needed (constipation) (Patient not taking: No sig reported) 473 mL 0    vitamin D3 (CHOLECALCIFEROL) 10 MCG (400 UNIT) TABS tablet Take 2 tablets by mouth daily (Patient not taking: No sig reported) 60 tablet 0    sucralfate (CARAFATE) 1 GM tablet Take 1 tablet by mouth 4 times daily (Patient not taking: No sig reported) 120 tablet 3    potassium chloride (KLOR-CON M) 20 MEQ extended release tablet Take 1 tablet by mouth 2 times daily (Patient not taking: No sig reported) 60 tablet 1    lidocaine-prilocaine (EMLA) 2.5-2.5 % cream PRN  Apply to port about 45 minutes prior to access (Patient not taking: No sig reported)       No current facility-administered medications for this visit. Facility-Administered Medications Ordered in Other Visits   Medication Dose Route Frequency Provider Last Rate Last Admin    sodium chloride flush 0.9 % injection 10 mL  10 mL IntraVENous PRN Ramon Tucker MD   10 mL at 12/22/22 1000    0.9 % sodium chloride infusion  5-250 mL/hr IntraVENous PRN Ramon Tucker MD   Stopped at 12/22/22 1410    ramucirumab (CYRAMZA) 386 mg in sodium chloride 0.9 % 250 mL chemo IVPB  8 mg/kg (Treatment Plan Recorded) IntraVENous Once Ramon Tucker MD        sodium chloride flush 0.9 % injection 5-40 mL  5-40 mL IntraVENous PRN Ramon Tucker MD   10 mL at 12/22/22 1216       Allergies:  No Known Allergies    Review of Systems: The Review of Systems is documented in full in the internal medical record. All systems are negative other than for those noted above. Past Medical History:  Documented in electronic medical record    Past Surgical History:  Documented in electronic medical record    Social History:  Documented in electronic medical record    Family History:  Documented in electronic medical record      Physical Examination:  General Appearance: Mildly ill appearing patient in no acute distress.    Vital signs: /84 (Site: Right Upper Arm, Position: Sitting)   Pulse 97   Temp 98.4 °F (36.9 °C)   Resp 14   Ht would be somewhat inconsistent with the pathology findings. She has been treated with FOLFOX and Avastin. There was certainly no symptomatic response and in fact she developed progressive and symptomatic ascites suggesting disease progression. She is now on TPN and receiving paclitaxel and ramucirumab. Although there have been no obvious radiographic changes, she does seem to be responding. The drainage of her ascites is much less. She is actually able to eat some food intake and nutrition bit by mouth. She continues to receive TPN. She seems a bit stronger. Protein calorie malnutrition-now on TPN      PLAN:  Proceed with additional paclitaxel and ramucirumab. Continue home TPN through [facility]. No other changes in management. At some point, if she continues to have a good subjective and objective response she may be a candidate for reexploration and possible HIPEC. RESUSCITATION DIRECTIVES/HOSPICE CARE;  Full Support    Addendum: The patient's urinary protein creatinine ratio was over 2. This has prompted me to hold her ramucirumab dose today. My concern is that the elevated level of protein in her urine may have nothing to do with the ramucirumab but may have more to do with the inflammatory findings in her urine perhaps in part related to the ureteral blockage. We may have to monitor this a bit to sort this out. I did would be disinclined to want to discontinue a part of her regimen that seems to be working for a reason that may in fact not truly be a side effect.     Jennifer Peralta MD [facility]    Oncology and Hematology   [address]  P [phone]  F [phone]  [email or web address]

## 2022-12-22 NOTE — PROGRESS NOTES
Outpatient Palliative Care at the  [facility]: Office Visit Established Patient    Diagnosis: metastatic carcinoma of unknown primary    Treatment Plan: FOLFOX + Avastin--> Taxol/Cyramza    Treatment Intent: Palliative    Medical Oncologist: Dr. Luke Huynh Oncologist: N/A    Navigator: Prabhjot Fonseca RN      Chief Complaint:    Chief Complaint   Patient presents with    Follow-up       History of Present Illness:  Ms. Anay Iglesias is a 52 y.o. female who presents today for evaluation regarding introduction to palliative care. Patient initially presented with back pain, difficulty swallowing in January 2022. A CT on 1/27/22 showed soft tissue stranding throughout retroperitoneum, wall thickening of colon, and ascites. On 2/1/22, Dr. Camacho Gaspar performed laparoscopy and biopsy revealing peritoneal carcinomatosis; biopsy showed poorly differentiated metastatic carcinoma potentially consistent with upper GI primary. PET negative for activity in upper GI tract. She had bilateral ureteral stent placement and bilateral salpingo-oophorectomies on 2/15/22- biopsy from that surgery showed poorly differentiated carcinoma with occasional signet ring features. She is followed by Dr. Alexander Robbins and began FOLFOX in April 2022. Late October, she was hospitalized for dysphagia, intractable vomiting due to peritoneal carcinomatosis. She was started on TPN. Patient lives with her boyfriend and 24DP daughter, Myron Serna. She lives 1 mile from her mom. Her dad is not involved in her life and she nor her mom know how to contact her dad. Interval History:  Patient seen in clinic in coordination with her office visit with Dr. Alexander Robbins. Her mom is with her. Patient is stable overall with no new complaints. She has gained one pound since last visit. She continues TPN for 14 hours. She has tried foods by mouth and tolerating well. Most recently, she has been enjoying steamed broccoli. Dairy causes her GI upset. She denies nausea, constipation, pain. She continues to drain less fluid from her abdominal PleurX- yesterday she states she drained approximately 100cc. Review of Systems:  Review of Systems   Constitutional:         Weight stable   Gastrointestinal:  Positive for nausea. Negative for abdominal pain. Improved with TPN   Psychiatric/Behavioral:  Negative for dysphoric mood and sleep disturbance. The patient is not nervous/anxious. All other systems reviewed and are negative.       No Known Allergies  Past Medical History:   Diagnosis Date    Anemia     -- not a problem since hyst    Colon cancer ([address] dx 2022     plans for chemo--- followed by dr Delilah Marroquin    COVID-19 2020    no hospitalization    GERD (gastroesophageal reflux disease)     managed with med    History of colonoscopy 2018    Dr. Glen Fuentes, nl (see media note), R     Hx of blood clots 2022    per pt \"small clot on lung identified by CT scan\"  CT Scan impression:- Nonobstructive pulmonary filling defect involving Left Lower Lobe     Hypotension     asymptomatic    Obstruction of fallopian tube     per pt has \"1 good tube\"    Peritoneal carcinomatosis ([address]     Weight loss     80lbs weight loss after gastric sleeve     Past Surgical History:   Procedure Laterality Date     SECTION      CYSTOSCOPY Bilateral 2022    CYSTOSCOPY BILATERAL RETROGRADE PYELOGRAM performed by Nomi Sexton MD at [address] Bilateral 2022    BILATERAL CYSTOSCOPY URETERAL STENT EXCHANGE performed by Nomi Sexton MD at [address] Bilateral 10/6/2022    CYSTOSCOPY BILATERAL URETERAL STENT REMOVAL, RIGHT URETERAL [address] by [facility] Sexton MD at [facility] OR    GASTRIC BYPASS SURGERY  2014    gastric sleeve- [place]    HYSTERECTOMY (CERVIX STATUS UNKNOWN)      2018    HYSTERECTOMY (CERVIX STATUS UNKNOWN)  2020    [facility] w/ Bilateral salpingectomy and left oophorectomy    IR PERC CATH PLEURAL DRAIN W/IMAG  9/26/2022    IR PERC CATH PLEURAL DRAIN W/IMAG 9/26/2022 [facility]    IR PORT PLACEMENT EQUAL OR GREATER THAN 5 YEARS  2/28/2022    IR PORT PLACEMENT EQUAL OR GREATER THAN 5 YEARS  2/28/2022    IR PORT PLACEMENT EQUAL OR GREATER THAN 5 YEARS 2/28/2022 [facility]    LAPAROTOMY N/A 8/17/2022    EXPLORATORY LAPAROTOMY/ ENTEROENTEROSTOMY performed by Karlie Hennessy MD at [facility]. [address]  age [address] 21[address]\"    also \"unblocked her FT\"    TONSILLECTOMY      UPPER GASTROINTESTINAL ENDOSCOPY      with dilation    UPPER GASTROINTESTINAL ENDOSCOPY N/A 9/13/2022    EGD ESOPHAGOGASTRODUODENOSCOPY OFL 17 To IR after recovery for Para performed by Selena Giordano MD at [facility] ENDOSCOPY    UPPER GASTROINTESTINAL ENDOSCOPY N/A 10/21/2022    EGD DILATION BALLOON performed by Delfino Valentin MD at [facility] ENDOSCOPY    UROLOGICAL SURGERY Bilateral 03/15/2022    cysto     Family History   Problem Relation Age of Onset    Heart Disease Maternal Grandmother     Hypertension Maternal Grandmother     Heart Disease Maternal Grandfather     Hypertension Maternal Grandfather     Pulmonary Embolism Neg Hx     Colon Cancer Neg Hx     Deep Vein Thrombosis Neg Hx     Ovarian Cancer Neg Hx     Prostate Cancer Neg Hx     Breast Cancer Neg Hx      Social History     Socioeconomic History    Marital status: Single     Spouse name: Not on file    Number of children: Not on file    Years of education: Not on file    Highest education level: Not on file   Occupational History    Not on file   Tobacco Use    Smoking status: Never    Smokeless tobacco: Never   Vaping Use    Vaping Use: Never used   Substance and Sexual Activity    Alcohol use: Not Currently    Drug use: No    Sexual activity: Not on file   Other Topics Concern    Not on file   Social History Narrative    1. Use large speculum.    2.  sister, Angie Martines #[id number] and mom is Sandhya Long #[id number] (both Anna's pts)  3. PCP  Dr. Jimmy Hodgson ([facility]), GI Dr. Shin Bello-2018 normal EGD     Social Determinants of Health     Financial Resource Strain: Not on file   Food Insecurity: Not on file   Transportation Needs: Not on file   Physical Activity: Not on file   Stress: Not on file   Social Connections: Not on file   Intimate Partner Violence: Not on file   Housing Stability: Not on file     Current Outpatient Medications   Medication Sig Dispense Refill    pantoprazole (PROTONIX) 40 MG tablet Take 1 tablet by mouth in the morning and at bedtime 60 tablet 0    folic acid (FOLVITE) 1 MG tablet Take 1 tablet by mouth daily 30 tablet 0    lactulose (CHRONULAC) 10 GM/15ML solution Take 30 mLs by mouth 2 times daily as needed (constipation) (Patient not taking: No sig reported) 473 mL 0    metoclopramide (REGLAN) 5 MG tablet Take 1 tablet by mouth 3 times daily (Patient taking differently: Take 5 mg by mouth in the morning and at bedtime) 90 tablet 0    vitamin D3 (CHOLECALCIFEROL) 10 MCG (400 UNIT) TABS tablet Take 2 tablets by mouth daily (Patient not taking: No sig reported) 60 tablet 0    sennosides-docusate sodium (SENOKOT-S) 8.6-50 MG tablet Take 2 tablets by mouth daily as needed for Constipation 90 tablet 0    sucralfate (CARAFATE) 1 GM tablet Take 1 tablet by mouth 4 times daily (Patient not taking: No sig reported) 120 tablet 3    potassium chloride (KLOR-CON M) 20 MEQ extended release tablet Take 1 tablet by mouth 2 times daily (Patient not taking: No sig reported) 60 tablet 1    lidocaine-prilocaine (EMLA) 2.5-2.5 % cream PRN  Apply to port about 45 minutes prior to access (Patient not taking: No sig reported)      ondansetron (ZOFRAN) 8 MG tablet Take 8 mg by mouth every 8 hours as needed       No current facility-administered medications for this visit.      Facility-Administered Medications Ordered in Other Visits   Medication Dose Route Frequency Provider Last Rate Last Admin    sodium chloride flush 0.9 % injection 10 mL  10 mL IntraVENous PRN Jordon Arredondo MD   10 mL at 12/22/22 1000    0.9 % sodium chloride infusion  5-250 mL/hr IntraVENous PRN Jordon Arredondo MD 20 mL/hr at 12/22/22 1217 20 mL/hr at 12/22/22 1217    ramucirumab (CYRAMZA) 386 mg in sodium chloride 0.9 % 250 mL chemo IVPB  8 mg/kg (Treatment Plan Recorded) IntraVENous Once Jordon Arredondo MD        PACLitaxel (TAXOL) 120 mg in sodium chloride 0.9 % 250 mL chemo infusion  80 mg/m2 (Treatment Plan Recorded) IntraVENous Once Jordon Arredondo  mL/hr at 12/22/22 1305 120 mg at 12/22/22 1305    sodium chloride flush 0.9 % injection 5-40 mL  5-40 mL IntraVENous PRN Jordon Arredondo MD   10 mL at 12/22/22 1216       OBJECTIVE:  Wt Readings from Last 1 Encounters:   12/22/22 109 lb 4.8 oz (49.6 kg)     Temp Readings from Last 1 Encounters:   12/22/22 98.4 °F (36.9 °C)     BP Readings from Last 1 Encounters:   12/22/22 110/84     Pulse Readings from Last 1 Encounters:   12/22/22 97        Pain Score: Zero (Fatigue- 3)    Physical Exam:  Constitutional: Well developed, well nourished thin female in no acute distress. HEENT: Normocephalic and atraumatic. Extraocular muscles are intact. Sclerae anicteric. Neck supple without JVD. Lymph node   deferred   Skin Warm and dry. No bruising and no rash noted. No erythema. No pallor. Respiratory Unlabored respiratory effort. CVS Normotensive, normal rate   Abdomen Soft, nontender and nondistended. PleurX catheter present. Neuro Grossly nonfocal with no obvious sensory or motor deficits. MSK Normal range of motion in general.  No edema and no tenderness. Psych Appropriate mood and affect.         Labs:  Recent Results (from the past 24 hour(s))   CBC With Auto Differential    Collection Time: 12/22/22  9:53 AM   Result Value Ref Range    WBC 3.8 (L) 4.3 - 11.1 K/uL    RBC 3.10 (L) 4.05 - 5.2 M/uL    Hemoglobin 8.3 (L) 11.7 - 15.4 g/dL    Hematocrit 27.6 (L) 35.8 - 46.3 %    MCV 89.0 82.0 - 102.0 FL    MCH 26.8 26.1 - 32.9 PG    MCHC 30.1 (L) 31.4 - 35.0 g/dL    RDW 19.0 (H) 11.9 - 14.6 %    Platelets 701 371 - 299 K/uL    MPV 10.5 9.4 - 12.3 FL    nRBC 0.04 0.0 - 0.2 K/uL    Differential Type AUTOMATED      Seg Neutrophils 51 43 - 78 %    Lymphocytes 40 13 - 44 %    Monocytes 5 4.0 - 12.0 %    Eosinophils % 3 0.5 - 7.8 %    Basophils 1 0.0 - 2.0 %    Immature Granulocytes 1 0.0 - 5.0 %    Segs Absolute 1.9 1.7 - 8.2 K/UL    Absolute Lymph # 1.5 0.5 - 4.6 K/UL    Absolute Mono # 0.2 0.1 - 1.3 K/UL    Absolute Eos # 0.1 0.0 - 0.8 K/UL    Basophils Absolute 0.0 0.0 - 0.2 K/UL    Absolute Immature Granulocyte 0.0 0.0 - 0.5 K/UL   Comprehensive metabolic panel    Collection Time: 12/22/22  9:53 AM   Result Value Ref Range    Sodium 137 133 - 143 mmol/L    Potassium 4.3 3.5 - 5.1 mmol/L    Chloride 105 101 - 110 mmol/L    CO2 29 21 - 32 mmol/L    Anion Gap 3 2 - 11 mmol/L    Glucose 91 65 - 100 mg/dL    BUN 18 6 - 23 MG/DL    Creatinine 0.40 (L) 0.6 - 1.0 MG/DL    Est, Glom Filt Rate >60 >60 ml/min/1.73m2    Calcium 8.8 8.3 - 10.4 MG/DL    Total Bilirubin 0.2 0.2 - 1.1 MG/DL    ALT 20 12 - 65 U/L    AST 21 15 - 37 U/L    Alk Phosphatase 57 50 - 136 U/L    Total Protein 6.7 6.3 - 8.2 g/dL    Albumin 2.7 (L) 3.5 - 5.0 g/dL    Globulin 4.0 2.8 - 4.5 g/dL    Albumin/Globulin Ratio 0.7 0.4 - 1.6     Urinalysis    Collection Time: 12/22/22  9:53 AM   Result Value Ref Range    Color, UA RED      Appearance TURBID      Specific Gravity, UA 1.025 (H) 1.001 - 1.023      pH, Urine 7.5 5.0 - 9.0      Protein,  (A) NEG mg/dL    Glucose, UA Negative NEG mg/dL    Ketones, Urine Negative NEG mg/dL    Bilirubin Urine Negative NEG      Blood, Urine LARGE (A) NEG      Urobilinogen, Urine 0.2 0.2 - 1.0 EU/dL    Nitrite, Urine Negative NEG      Leukocyte Esterase, Urine MODERATE (A) NEG     CEA    Collection Time: 12/22/22  9:53 AM   Result Value Ref Range    CEA 3.3 (H) 0.0 - 3.0 ng/mL   Magnesium    Collection Time: 12/22/22  9:53 AM   Result Value Ref Range    Magnesium 2.2 1.8 - 2.4 mg/dL   Urinalysis, Micro    Collection Time: 12/22/22  9:53 AM   Result Value Ref Range    WBC, UA 5-10 0 /hpf    RBC, UA >100 0 /hpf    Epithelial Cells UA 0-3 0 /hpf    BACTERIA, URINE 1+ (H) 0 /hpf    Casts 0 0 /lpf    Crystals 0 0 /LPF    Mucus, UA 0 0 /lpf   Protein / creatinine ratio, urine    Collection Time: 12/22/22  9:53 AM   Result Value Ref Range    Protein, Urine, Random 72 (H) <11.9 mg/dL    Creatinine, Ur 32.00 mg/dL    PROTEIN/CREAT RATIO URINE RAN 2.3         Imaging:  No results found for this or any previous visit. ASSESSMENT:   Diagnosis Orders   1. Neoplastic malignant related fatigue        2. Carcinoma of unknown primary ([facility] [address]        3. Encounter for palliative care                PLAN:  Lab studies and imaging studies were personally reviewed. Pertinent old records were reviewed from previous [facility] encounters     Case discussed with Dr. Paulino Neff    Fatigue: Ongoing. Continue alternating periods of rest and activity as tolerated. Abdominal pain: denies  Nausea: Denies      Advanced Care Planning Discussed: None today. Have previously discussed availability of HCPOA here, as well as [facility] introduction. Will follow up in: 4-6 weeks or earlier if needed      I have reviewed the patient's controlled substance prescription history, as maintained in the [place] prescription monitoring program, so that the prescriptions(s) for a controlled substance can be given.   Last Date Reviewed: 12/22/22          SCOTT Lynn CNP  Outpatient Palliative Care at the  [address]  Office : [phone]  Fax : [phone]

## 2022-12-26 DIAGNOSIS — C80.1 METASTASIS TO PERITONEUM OF UNKNOWN PRIMARY (HCC): Primary | ICD-10-CM

## 2022-12-26 DIAGNOSIS — C78.6 METASTASIS TO PERITONEUM OF UNKNOWN PRIMARY (HCC): Primary | ICD-10-CM

## 2022-12-30 NOTE — PROGRESS NOTES
Nutrition F/U:  Assessment:  Pt seen during office visit w/ Dr. Shanelle Dover, continues to tolerate Cyramza + Taxol, remains TPN dependent for 100% of estimated nutrition needs, TPN managed by [facility] and infusing over 14 hrs/day. Pt has tried some cooked vegetables, although picking higher fiber/gas causing vegetables such as broccoli, cannot tolerate dairy. Pt's abdominal pleurx drain remains in place and pt draining once every 2 weeks with ~100 ml output most recently - decreased. Current BW: 109#, stable over the past 2 weeks. Intervention:  1. Continue w/ po diet as tolerated, again discussed trying more GI soft foods. 2. TPN per [facility]     Monitoring/Evaluation:  1. RD to follow up during next office visit - follow up wt status, tolerance/intake of po diet, symptom management.       3 [facility], [address], [address] [facility]

## 2023-01-05 ENCOUNTER — CLINICAL DOCUMENTATION (OUTPATIENT)
Dept: ONCOLOGY | Age: 48
End: 2023-01-05

## 2023-01-05 ENCOUNTER — HOSPITAL ENCOUNTER (OUTPATIENT)
Dept: INFUSION THERAPY | Age: 48
Discharge: HOME | End: 2023-01-05
Payer: COMMERCIAL

## 2023-01-05 ENCOUNTER — OFFICE VISIT (OUTPATIENT)
Dept: ONCOLOGY | Age: 48
End: 2023-01-05
Payer: COMMERCIAL

## 2023-01-05 VITALS
DIASTOLIC BLOOD PRESSURE: 89 MMHG | SYSTOLIC BLOOD PRESSURE: 132 MMHG | RESPIRATION RATE: 19 BRPM | HEIGHT: 64 IN | TEMPERATURE: 98.3 F | HEART RATE: 90 BPM | OXYGEN SATURATION: 100 % | WEIGHT: 112.6 LBS | BODY MASS INDEX: 19.22 KG/M2

## 2023-01-05 DIAGNOSIS — C80.1 CARCINOMA OF UNKNOWN PRIMARY (HCC): ICD-10-CM

## 2023-01-05 DIAGNOSIS — R53.83 FATIGUE DUE TO TREATMENT: ICD-10-CM

## 2023-01-05 DIAGNOSIS — C78.6 METASTASIS TO PERITONEUM OF UNKNOWN PRIMARY (HCC): ICD-10-CM

## 2023-01-05 DIAGNOSIS — Z78.9 ON TOTAL PARENTERAL NUTRITION (TPN): ICD-10-CM

## 2023-01-05 DIAGNOSIS — C80.1 METASTASIS TO PERITONEUM OF UNKNOWN PRIMARY (HCC): Primary | ICD-10-CM

## 2023-01-05 DIAGNOSIS — G62.0 CHEMOTHERAPY-INDUCED NEUROPATHY (HCC): ICD-10-CM

## 2023-01-05 DIAGNOSIS — T45.1X5A CHEMOTHERAPY-INDUCED NEUROPATHY (HCC): ICD-10-CM

## 2023-01-05 DIAGNOSIS — C80.1 METASTASIS TO PERITONEUM OF UNKNOWN PRIMARY (HCC): ICD-10-CM

## 2023-01-05 DIAGNOSIS — C78.6 METASTASIS TO PERITONEUM OF UNKNOWN PRIMARY (HCC): Primary | ICD-10-CM

## 2023-01-05 LAB
ALBUMIN SERPL-MCNC: 2.5 G/DL (ref 3.5–5)
ALBUMIN/GLOB SERPL: 0.6 (ref 0.4–1.6)
ALP SERPL-CCNC: 69 U/L (ref 50–136)
ALT SERPL-CCNC: 20 U/L (ref 12–65)
ANION GAP SERPL CALC-SCNC: 7 MMOL/L (ref 2–11)
AST SERPL-CCNC: 20 U/L (ref 15–37)
BASOPHILS # BLD: 0 K/UL (ref 0–0.2)
BASOPHILS NFR BLD: 0 % (ref 0–2)
BILIRUB SERPL-MCNC: 0.2 MG/DL (ref 0.2–1.1)
BUN SERPL-MCNC: 25 MG/DL (ref 6–23)
CALCIUM SERPL-MCNC: 8.7 MG/DL (ref 8.3–10.4)
CEA SERPL-MCNC: 2.6 NG/ML (ref 0–3)
CHLORIDE SERPL-SCNC: 104 MMOL/L (ref 101–110)
CO2 SERPL-SCNC: 28 MMOL/L (ref 21–32)
CREAT SERPL-MCNC: 0.5 MG/DL (ref 0.6–1)
DIFFERENTIAL METHOD BLD: ABNORMAL
EOSINOPHIL # BLD: 0.1 K/UL (ref 0–0.8)
EOSINOPHIL NFR BLD: 1 % (ref 0.5–7.8)
ERYTHROCYTE [DISTWIDTH] IN BLOOD BY AUTOMATED COUNT: 20.3 % (ref 11.9–14.6)
GLOBULIN SER CALC-MCNC: 4 G/DL (ref 2.8–4.5)
GLUCOSE SERPL-MCNC: 102 MG/DL (ref 65–100)
HCT VFR BLD AUTO: 27.9 % (ref 35.8–46.3)
HGB BLD-MCNC: 8.1 G/DL (ref 11.7–15.4)
IMM GRANULOCYTES # BLD AUTO: 0 K/UL (ref 0–0.5)
IMM GRANULOCYTES NFR BLD AUTO: 0 % (ref 0–5)
LYMPHOCYTES # BLD: 1.5 K/UL (ref 0.5–4.6)
LYMPHOCYTES NFR BLD: 23 % (ref 13–44)
MAGNESIUM SERPL-MCNC: 2.2 MG/DL (ref 1.8–2.4)
MCH RBC QN AUTO: 25.7 PG (ref 26.1–32.9)
MCHC RBC AUTO-ENTMCNC: 29 G/DL (ref 31.4–35)
MCV RBC AUTO: 88.6 FL (ref 82–102)
MONOCYTES # BLD: 0.8 K/UL (ref 0.1–1.3)
MONOCYTES NFR BLD: 13 % (ref 4–12)
NEUTS SEG # BLD: 3.9 K/UL (ref 1.7–8.2)
NEUTS SEG NFR BLD: 63 % (ref 43–78)
NRBC # BLD: 0 K/UL (ref 0–0.2)
PLATELET # BLD AUTO: 422 K/UL (ref 150–450)
PMV BLD AUTO: 10.3 FL (ref 9.4–12.3)
POTASSIUM SERPL-SCNC: 4.4 MMOL/L (ref 3.5–5.1)
PROT SERPL-MCNC: 6.5 G/DL (ref 6.3–8.2)
PROT UR-MCNC: 25 MG/DL
RBC # BLD AUTO: 3.15 M/UL (ref 4.05–5.2)
SODIUM SERPL-SCNC: 139 MMOL/L (ref 133–143)
WBC # BLD AUTO: 6.3 K/UL (ref 4.3–11.1)

## 2023-01-05 PROCEDURE — 2580000003 HC RX 258: Performed by: INTERNAL MEDICINE

## 2023-01-05 PROCEDURE — 96375 TX/PRO/DX INJ NEW DRUG ADDON: CPT

## 2023-01-05 PROCEDURE — 99214 OFFICE O/P EST MOD 30 MIN: CPT | Performed by: NURSE PRACTITIONER

## 2023-01-05 PROCEDURE — 36591 DRAW BLOOD OFF VENOUS DEVICE: CPT

## 2023-01-05 PROCEDURE — 82378 CARCINOEMBRYONIC ANTIGEN: CPT

## 2023-01-05 PROCEDURE — 6360000002 HC RX W HCPCS: Performed by: NURSE PRACTITIONER

## 2023-01-05 PROCEDURE — A4216 STERILE WATER/SALINE, 10 ML: HCPCS | Performed by: NURSE PRACTITIONER

## 2023-01-05 PROCEDURE — 85025 COMPLETE CBC W/AUTO DIFF WBC: CPT

## 2023-01-05 PROCEDURE — 2580000003 HC RX 258: Performed by: NURSE PRACTITIONER

## 2023-01-05 PROCEDURE — 83735 ASSAY OF MAGNESIUM: CPT

## 2023-01-05 PROCEDURE — 96413 CHEMO IV INFUSION 1 HR: CPT

## 2023-01-05 PROCEDURE — 80053 COMPREHEN METABOLIC PANEL: CPT

## 2023-01-05 PROCEDURE — 2500000003 HC RX 250 WO HCPCS: Performed by: NURSE PRACTITIONER

## 2023-01-05 PROCEDURE — 96417 CHEMO IV INFUS EACH ADDL SEQ: CPT

## 2023-01-05 PROCEDURE — 84156 ASSAY OF PROTEIN URINE: CPT

## 2023-01-05 RX ORDER — MEPERIDINE HYDROCHLORIDE 50 MG/ML
12.5 INJECTION INTRAMUSCULAR; INTRAVENOUS; SUBCUTANEOUS PRN
OUTPATIENT
Start: 2023-01-05

## 2023-01-05 RX ORDER — SODIUM CHLORIDE 9 MG/ML
5-40 INJECTION INTRAVENOUS PRN
OUTPATIENT
Start: 2023-01-05

## 2023-01-05 RX ORDER — SODIUM CHLORIDE 0.9 % (FLUSH) 0.9 %
5-40 SYRINGE (ML) INJECTION PRN
Status: DISCONTINUED | OUTPATIENT
Start: 2023-01-05 | End: 2023-01-06 | Stop reason: HOSPADM

## 2023-01-05 RX ORDER — DEXAMETHASONE SODIUM PHOSPHATE 10 MG/ML
10 INJECTION INTRAMUSCULAR; INTRAVENOUS ONCE
Status: COMPLETED | OUTPATIENT
Start: 2023-01-05 | End: 2023-01-05

## 2023-01-05 RX ORDER — FAMOTIDINE 10 MG/ML
20 INJECTION, SOLUTION INTRAVENOUS ONCE
Status: CANCELLED | OUTPATIENT
Start: 2023-01-05 | End: 2023-01-05

## 2023-01-05 RX ORDER — ONDANSETRON 2 MG/ML
8 INJECTION INTRAMUSCULAR; INTRAVENOUS ONCE
Status: CANCELLED | OUTPATIENT
Start: 2023-01-05 | End: 2023-01-05

## 2023-01-05 RX ORDER — FAMOTIDINE 10 MG/ML
20 INJECTION, SOLUTION INTRAVENOUS
OUTPATIENT
Start: 2023-01-05

## 2023-01-05 RX ORDER — DIPHENHYDRAMINE HYDROCHLORIDE 50 MG/ML
50 INJECTION INTRAMUSCULAR; INTRAVENOUS ONCE
Status: CANCELLED | OUTPATIENT
Start: 2023-01-05 | End: 2023-01-05

## 2023-01-05 RX ORDER — DIPHENHYDRAMINE HYDROCHLORIDE 50 MG/ML
50 INJECTION INTRAMUSCULAR; INTRAVENOUS ONCE
Status: COMPLETED | OUTPATIENT
Start: 2023-01-05 | End: 2023-01-05

## 2023-01-05 RX ORDER — SODIUM CHLORIDE 0.9 % (FLUSH) 0.9 %
5-40 SYRINGE (ML) INJECTION PRN
Status: CANCELLED | OUTPATIENT
Start: 2023-01-05

## 2023-01-05 RX ORDER — SODIUM CHLORIDE 9 MG/ML
5-250 INJECTION, SOLUTION INTRAVENOUS PRN
Status: CANCELLED | OUTPATIENT
Start: 2023-01-05

## 2023-01-05 RX ORDER — ACETAMINOPHEN 325 MG/1
650 TABLET ORAL
OUTPATIENT
Start: 2023-01-05

## 2023-01-05 RX ORDER — HEPARIN SODIUM (PORCINE) LOCK FLUSH IV SOLN 100 UNIT/ML 100 UNIT/ML
500 SOLUTION INTRAVENOUS PRN
OUTPATIENT
Start: 2023-01-05

## 2023-01-05 RX ORDER — ALBUTEROL SULFATE 90 UG/1
4 AEROSOL, METERED RESPIRATORY (INHALATION) PRN
OUTPATIENT
Start: 2023-01-05

## 2023-01-05 RX ORDER — SODIUM CHLORIDE 9 MG/ML
5-250 INJECTION, SOLUTION INTRAVENOUS PRN
OUTPATIENT
Start: 2023-01-05

## 2023-01-05 RX ORDER — EPINEPHRINE 1 MG/ML
0.3 INJECTION, SOLUTION, CONCENTRATE INTRAVENOUS PRN
OUTPATIENT
Start: 2023-01-05

## 2023-01-05 RX ORDER — SODIUM CHLORIDE 9 MG/ML
INJECTION, SOLUTION INTRAVENOUS CONTINUOUS
OUTPATIENT
Start: 2023-01-05

## 2023-01-05 RX ORDER — SODIUM CHLORIDE 9 MG/ML
5-250 INJECTION, SOLUTION INTRAVENOUS PRN
Status: DISCONTINUED | OUTPATIENT
Start: 2023-01-05 | End: 2023-01-06 | Stop reason: HOSPADM

## 2023-01-05 RX ORDER — SODIUM CHLORIDE 0.9 % (FLUSH) 0.9 %
10 SYRINGE (ML) INJECTION PRN
Status: DISCONTINUED | OUTPATIENT
Start: 2023-01-05 | End: 2023-01-06 | Stop reason: HOSPADM

## 2023-01-05 RX ORDER — DIPHENHYDRAMINE HYDROCHLORIDE 50 MG/ML
50 INJECTION INTRAMUSCULAR; INTRAVENOUS
OUTPATIENT
Start: 2023-01-05

## 2023-01-05 RX ORDER — ONDANSETRON 2 MG/ML
8 INJECTION INTRAMUSCULAR; INTRAVENOUS
OUTPATIENT
Start: 2023-01-05

## 2023-01-05 RX ORDER — ONDANSETRON 2 MG/ML
8 INJECTION INTRAMUSCULAR; INTRAVENOUS ONCE
Status: COMPLETED | OUTPATIENT
Start: 2023-01-05 | End: 2023-01-05

## 2023-01-05 RX ADMIN — SODIUM CHLORIDE, PRESERVATIVE FREE 10 ML: 5 INJECTION INTRAVENOUS at 08:45

## 2023-01-05 RX ADMIN — SODIUM CHLORIDE 25 ML/HR: 9 INJECTION, SOLUTION INTRAVENOUS at 08:50

## 2023-01-05 RX ADMIN — FAMOTIDINE 20 MG: 10 INJECTION, SOLUTION INTRAVENOUS at 10:26

## 2023-01-05 RX ADMIN — ONDANSETRON 8 MG: 2 INJECTION INTRAMUSCULAR; INTRAVENOUS at 10:28

## 2023-01-05 RX ADMIN — PACLITAXEL 120 MG: 6 INJECTION, SOLUTION INTRAVENOUS at 11:10

## 2023-01-05 RX ADMIN — DEXAMETHASONE SODIUM PHOSPHATE 10 MG: 10 INJECTION INTRAMUSCULAR; INTRAVENOUS at 10:29

## 2023-01-05 RX ADMIN — SODIUM CHLORIDE 386 MG: 9 INJECTION, SOLUTION INTRAVENOUS at 09:15

## 2023-01-05 RX ADMIN — SODIUM CHLORIDE, PRESERVATIVE FREE 10 ML: 5 INJECTION INTRAVENOUS at 07:30

## 2023-01-05 RX ADMIN — DIPHENHYDRAMINE HYDROCHLORIDE 50 MG: 50 INJECTION, SOLUTION INTRAMUSCULAR; INTRAVENOUS at 08:56

## 2023-01-05 ASSESSMENT — PATIENT HEALTH QUESTIONNAIRE - PHQ9
2. FEELING DOWN, DEPRESSED OR HOPELESS: 0
SUM OF ALL RESPONSES TO PHQ QUESTIONS 1-9: 0
SUM OF ALL RESPONSES TO PHQ QUESTIONS 1-9: 0
SUM OF ALL RESPONSES TO PHQ9 QUESTIONS 1 & 2: 0
SUM OF ALL RESPONSES TO PHQ QUESTIONS 1-9: 0
SUM OF ALL RESPONSES TO PHQ QUESTIONS 1-9: 0
1. LITTLE INTEREST OR PLEASURE IN DOING THINGS: 0

## 2023-01-05 NOTE — PROGRESS NOTES
[facility]  [address] [address]  [phone]        Ms. Caty Raphael is a 52 y.o. 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 She has bilateral hydronephrosis from her disease. She is status post bilateral stent placement on 6/28/2022. INTERVAL HISTORY: Patient is here today for follow-up of her bilateral hydronephrosis. She is currently under treatment for carcinomatosis of an unknown primary. She has been responding better to her systemic therapy. She has a week break in therapy in 2 weeks. Her stents were exchanged on 6/28/2022. She has 6 Western Saadia by 22 cm Tria stents in place. Recent CT scan showed right greater than left hydronephrosis. Her creatinine on 1/5/2023 was 0.5. Her white count was 6.3. She denies any current dysuria or hematuria. From previous note:  She is here today because she has had some mild ongoing hematuria after stent placement 2 weeks ago. She denies any dysuria or flank pain. She has not had any fever or chills. She states over the last 3 to 4 days she has only had a minimal amount of blood in her urine. There have not been clots. Past medical, family and social histories, as well as medications and allergies, were reviewed and updated in the medical record as appropriate.     PMH:     Past Medical History:   Diagnosis Date    Anemia     2012-- not a problem since hyst    Colon cancer ([facility] [address] dx 2/2022     plans for chemo--- followed by dr Cruz De Los Santos    COVID-19 12/2020    no hospitalization    GERD (gastroesophageal reflux disease)     managed with med    History of colonoscopy 05/2018    Dr. Deysi Ludwig, nl (see media note), R 2028    Hx of blood clots 06/2022    per pt \"small clot on lung identified by CT scan\"  CT Scan impression:- Nonobstructive pulmonary filling defect involving Left Lower Lobe     Hypotension     asymptomatic Obstruction of fallopian tube     per pt has \"1 good tube\"    Peritoneal carcinomatosis ([address]     Weight loss     80lbs weight loss after gastric sleeve       MEDs:     folic acid  lactulose  lidocaine-prilocaine  metoclopramide  ondansetron  pantoprazole  potassium chloride  sennosides-docusate sodium  sodium chloride  sodium chloride flush  sucralfate  vitamin D3 Tabs     ALLERGIES:    No Known Allergies    ROS:     Review of Systems   Constitutional: Negative. Negative for chills, fatigue and fever. Respiratory: Negative. Cardiovascular: Negative. Gastrointestinal: Negative. Genitourinary: Negative. Negative for difficulty urinating, dysuria, flank pain, frequency, hematuria and urgency. Musculoskeletal: Negative. All other systems reviewed and are negative. PHYSICAL EXAMINATION    There were no vitals taken for this visit. General: well dressed, well nourished, no acute distress  Skin: no rashes  HEENT: Sclera are clear,normocephalic, atraumatic. no external lesions   Cardiovascular: Reg. Normal perfusion  Respiratory: normal respiratory effort, no JVD, no audible wheezing. Musculoskeletal: unremarkable with normal function. No embolic signs or cyanosis. Neurologic exam: intact, no focal deficits, moves all 4 extremities  Psych: normal mood and affect, alert, oriented x 3  LE:  no edema  GI: soft, nontender, no masses, no CVA tenderness  : DEFERRED       LABORATORY RESULTS:        Recent Labs     01/05/23  0723   WBC 6.3   RBC 3.15*   HGB 8.1*   HCT 27.9*   MCV 88.6   MCH 25.7*   MCHC 29.0*   RDW 20.3*      MPV 10.3          Recent Labs     01/05/23  0723      K 4.4      CO2 28   BUN 25*   CREATININE 0.50*   GLOB 4.0   MG 2.2         IMAGING:    XR Results:    === 10/20/22 ===    XR CHEST LIMITED ONE VIEW    - Narrative -  Chest X-ray    INDICATION: Chest pain    AP/PA view of the chest was obtained. FINDINGS: The lungs are clear.  There are no infiltrates or effusions. The heart  size is normal.  Vascular port is present. - Impression -  No acute findings in the chest      CT Results:    === 12/21/22 ===    CT CHEST ABDOMEN PELVIS W CONTRAST    - Narrative -  EXAM: CT CHEST, ABDOMEN, AND PELVIS WITH CONTRAST    INDICATION: Metastatic carcinoma of unknown primary. COMPARISON: CT abdomen pelvis 10/20/2022, CT chest, abdomen, and pelvis  10/3/2022    TECHNIQUE:  CT imaging was performed of the chest, abdomen, and pelvis after  intravenous injection of 100 mL Isovue 370. Intravenous contrast was used for  better evaluation of solid organs and vascular structures. Oral contrast was  used for bowel opacification. Coronal reformatted imaging provided. Radiation  dose reduction techniques were used for this study. Our CT scanners use one or  all of the following: Automated exposure control, adjustment of the mA and/or kV  according to patient size, iterative reconstruction. FINDINGS:    CHEST:    Mediastinum and visualized thyroid: No mediastinal mass or lymphadenopathy. Heart: Normal.    Large Vessels: Normal.    Pleura: Normal.    Lungs: No discrete lung nodule. Airways: Normal.    ABDOMEN AND PELVIS:    Liver: Normal in size and morphology. No focal lesions. Gallbladder/biliary: Normal gallbladder. No biliary dilatation. Pancreas: Normal.    Spleen: Normal.    Adrenals: Normal.    Kidneys: Right nephroureteral stent in place. Unchanged to slightly worse  moderate right hydronephrosis. Left pelvic caliectasis or amie hydronephrosis. Bladder: Normal.    Pelvic organs: Status post hysterectomy. Gastrointestinal: Postsurgical changes in the proximal stomach. Stable diffuse  wall thickening of large and small bowel. Peritoneum/retroperitoneum: Drainage catheter terminating in the subhepatic  space unchanged in position. Stable small to moderate volume ascites. Diffuse  thickening of the parietal and visceral peritoneal surfaces.  Unchanged omental  caking. Lymph nodes: Normal.    Vessels: Normal.    Bones/Soft tissues: Normal.    - Impression -  1. Unchanged burden of peritoneal carcinomatosis with stable volume malignant  ascites. 2.  No evidence of disease progression the chest.  3.  Right nephroureteral stent with unchanged slightly worse moderate  hydronephrosis. MRI Results:    === 09/12/22 ===    MRI ABDOMEN WO CONTRAST    - Narrative -  MRI ABDOMEN WO CONTRAST 9/21/2022 3:01 PM    HISTORY: Abnormal liver function tests and abnormal ultrasound findings. COMPARISON: Abdominal ultrasound 9/20/2022. TECHNIQUE:  Multisequence, multiplanar imaging is performed through the biliary  system. 3D post processing was performed, images were archived to PACS and used  in the interpretation of this study. CONTRAST: None. FINDINGS:    ABDOMEN: Large ascites is present. Liver appears shrunken and cirrhotic. MRCP  sequence shows no evidence of intrahepatic or extrahepatic bile duct dilatation. Possible tiny filling defect within the right main hepatic duct on series 5,  images 15 and 16. No associated obstruction. No gallstone noted in the common  bile duct. Gallbladder demonstrates wall thickening and multiple intraluminal  stones as described on prior ultrasound. No focal liver lesions on this  noncontrast study. Pancreas, spleen and adrenal glands are unremarkable. Significant bilateral  hydronephrosis is present as described on prior ultrasound. No renal calculi are  appreciated when compared to prior CT from 9/12/2022. Bilateral ureteral stents  are in place. No bowel dilatation to suggest obstruction.    - Impression -  1. No significant intrahepatic or extrahepatic bile duct dilatation. No common  bile duct stone is appreciated. Questionable tiny filling defect within the  right main hepatic duct. Cholelithiasis. Nonspecific gallbladder wall thickening  as described on prior ultrasound.   2. Marked bilateral hydronephrosis with indwelling ureteral stents. 3. Large volume of ascites. Cirrhotic liver morphology. ASSESSMENT:    Ms. Pia Marcial is a 52 y.o. 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 She has bilateral hydronephrosis from her disease. She is status post bilateral stent placement on 6/28/2022. Her creatinine on 1/5/2023 was 0.5. We discussed treatment options including stent exchange versus stent removal.  Since she is showing improvement I recommended stent exchange. We will plan to again use a 6 Western Saadia by 22 cm Tria stents. We discussed the risks of this procedure as outlined below. She is well-informed. I like to check a urinalysis and urine culture today to ensure there is no underlying UTI. We discussed in detail the risks and benefits of cystoscopy and bilateral stent exchange. We discussed the chance of bladder perforation or ureteral damage and need for open repair or percutaneous nephrostomy tube placement. There is a small risk of damage to the urethra, ureteral orifices, or ureter which may result in stricture. We discussed risk of urinary infection/sepsis. We discussed the risk of general anesthesia and other operative risks including but not limited to; MI, stroke, DVT/PE, bleeding, infection, pain/LUTS, other cardiovascular, pulmonary, neurologic, and renal complications. We explained that as with any surgical procedure there is a small chance of death. A hyman catheter may be left in place after the procedure. All of the patient's questions were answered and they wish to proceed. We carefully explained the stent will have to be removed or replaced in the next 6-9 months and cannot remain in the patient or it will cause significant complications. Patient acknowledged the need for urologic follow-up for stent removal or continued management. PLAN:   -discuss stent exchange  -to OR for bilateral stent exchange.  To request 6Fr x 22cm Tria stents  -ua/ucx today   ________________________________________      I have seen and examined this patient. I have reviewed and edited the note started by the MA and agree with the outlined plan. Part of this note was written by using a voice dictation software. The note has been proof read but may still contain some grammatical/other typographical errors.       Meli Lemon [facility]

## 2023-01-05 NOTE — PROGRESS NOTES
Social Work Progress Note  Name: Melanie Borges  : 1975  MRN: [id number]  Date of Service: 2023    Length of Service: 16 minutes    Reason for Visit: F/U    Subjective: Seen patient with her mother, provided therapeutic reassurance. LISW-CP discussed, \"Distress is normal when you or a loved one has cancer. There are many things that suddenly seem uncertain. Distress is common in people with cancer and in their family members and loved ones. In fact, everything about having cancer is stressful. So, a certain amount of distress is normal when you or a loved one has cancer\". LISW-CP used reflective listening as patient spoke about emotional and physical distress. LISW-CP discussed with patient symptoms of emotional distress, causes, triggers and coping skills. Patient denied any other psychosocial needs at this time. Protective Factors: Current care for physical and mental illness, adequate insight and judgment, family support, cultural and [employer] beliefs and values that support self-care. Patient did not report suicidal ideations, intent or plans. Patient did not report homicidal ideations, intent or plans. Patient is oriented to self, place, time and situation. Next Steps: LISW-CP gave contact information and encouraged pt to call should any needs arise. Pt verbalized understanding. LISW-CP intends to follow up as needed.         NOLBERTO Del Rio

## 2023-01-05 NOTE — PATIENT INSTRUCTIONS
Patient Instructions from Today's Visit    Reason for Visit:  Prechemo visit    Diagnosis Information:  [email or web address] [email or web address]-us/[email or web address]-answers-patient-education-materials/zzad-ozrqiij-ogyx-sheets    Plan:  -Cyramza/Taxol today    Follow Up:   In one week    Recent Lab Results:  Hospital Outpatient Visit on 01/05/2023   Component Date Value Ref Range Status    WBC 01/05/2023 6.3  4.3 - 11.1 K/uL Final    RBC 01/05/2023 3.15 (A)  4.05 - 5.2 M/uL Final    Hemoglobin 01/05/2023 8.1 (A)  11.7 - 15.4 g/dL Final    Hematocrit 01/05/2023 27.9 (A)  35.8 - 46.3 % Final    MCV 01/05/2023 88.6  82.0 - 102.0 FL Final    MCH 01/05/2023 25.7 (A)  26.1 - 32.9 PG Final    MCHC 01/05/2023 29.0 (A)  31.4 - 35.0 g/dL Final    RDW 01/05/2023 20.3 (A)  11.9 - 14.6 % Final    Platelets 45/63/4753 422  150 - 450 K/uL Final    MPV 01/05/2023 10.3  9.4 - 12.3 FL Final    nRBC 01/05/2023 0.00  0.0 - 0.2 K/uL Final    **Note: Absolute NRBC parameter is now reported with Hemogram**    Seg Neutrophils 01/05/2023 63  43 - 78 % Final    Lymphocytes 01/05/2023 23  13 - 44 % Final    Monocytes 01/05/2023 13 (A)  4.0 - 12.0 % Final    Eosinophils % 01/05/2023 1  0.5 - 7.8 % Final    Basophils 01/05/2023 0  0.0 - 2.0 % Final    Immature Granulocytes 01/05/2023 0  0.0 - 5.0 % Final    Segs Absolute 01/05/2023 3.9  1.7 - 8.2 K/UL Final    Absolute Lymph # 01/05/2023 1.5  0.5 - 4.6 K/UL Final    Absolute Mono # 01/05/2023 0.8  0.1 - 1.3 K/UL Final    Absolute Eos # 01/05/2023 0.1  0.0 - 0.8 K/UL Final    Basophils Absolute 01/05/2023 0.0  0.0 - 0.2 K/UL Final    Absolute Immature Granulocyte 01/05/2023 0.0  0.0 - 0.5 K/UL Final    Differential Type 01/05/2023 AUTOMATED    Final    Sodium 01/05/2023 139  133 - 143 mmol/L Final    Potassium 01/05/2023 4.4  3.5 - 5.1 mmol/L Final    Chloride 01/05/2023 104  101 - 110 mmol/L Final    CO2 01/05/2023 28  21 - 32 mmol/L Final    Anion Gap 01/05/2023 7  2 - 11 mmol/L Final    Glucose 01/05/2023 102 (A) 65 - 100 mg/dL Final    BUN 01/05/2023 25 (A)  6 - 23 MG/DL Final    Creatinine 01/05/2023 0.50 (A)  0.6 - 1.0 MG/DL Final    Est, Glom Filt Rate 01/05/2023 >60  >60 ml/min/1.73m2 Final    Comment:      Pediatric calculator link: [email or web address].at. org/professionals/kdoqi/gfr_calculatorped       These results are not intended for use in patients <25years of age. eGFR results are calculated without a race factor using  the 2021 CKD-EPI equation. Careful clinical correlation is recommended, particularly when comparing to results calculated using previous equations. The CKD-EPI equation is less accurate in patients with extremes of muscle mass, extra-renal metabolism of creatinine, excessive creatine ingestion, or following therapy that affects renal tubular secretion. Calcium 01/05/2023 8.7  8.3 - 10.4 MG/DL Final    Total Bilirubin 01/05/2023 0.2  0.2 - 1.1 MG/DL Final    ALT 01/05/2023 20  12 - 65 U/L Final    AST 01/05/2023 20  15 - 37 U/L Final    Alk Phosphatase 01/05/2023 69  50 - 136 U/L Final    Total Protein 01/05/2023 6.5  6.3 - 8.2 g/dL Final    Albumin 01/05/2023 2.5 (A)  3.5 - 5.0 g/dL Final    Globulin 01/05/2023 4.0  2.8 - 4.5 g/dL Final    Albumin/Globulin Ratio 01/05/2023 0.6  0.4 - 1.6   Final    Protein, Urine, Random 01/05/2023 25 (A)  <11.9 mg/dL Final    CEA 01/05/2023 2.6  0.0 - 3.0 ng/mL Final    Comment: Nonsmoker:  <3.0 ng/mL  Smoker:     <5.0 ng/mL  [facility]. Patient's results of tumor marker testing may not be comparable to labs using different manufacturers/methods.       Magnesium 01/05/2023 2.2  1.8 - 2.4 mg/dL Final        Treatment Summary has been discussed and given to patient: n/a        -------------------------------------------------------------------------------------------------------------------  Please call our office at [phone] if you have any  of the following symptoms:   Fever of 100.5 or greater  Chills  Shortness of breath  Swelling or pain in one leg    After office hours an answering service is available and will contact a provider for emergencies or if you are experiencing any of the above symptoms. Patient arreola express an interest in My Chart. My Chart log in information explained on the after visit summary printout at the [facility] [address] desk.     Shahriar Wan, RN  Nurse Navigator  [address] [facility] [address]  [phone]

## 2023-01-05 NOTE — PROGRESS NOTES
HEMATOLOGY/ONCOLOGY FOLLOWUP  VISIT      Patient Name: Roro Shi             Date of Visit: 2023  : 1975  Age:47 y.o. Presenting Complaint:  Roro Shi  is seen in follow-up for carcinoma of unknown primary. History of Present Illness:  Ms. SENTARA BENOIT XIMENA was seen for the first time in our office in 2022. She was a woman of previously good health who began noticing left-sided back discomfort in early 2022. This was associated  ultimately with a sense of difficulty swallowing and ultimately she presented to [facility] for evaluation. Her symptoms were felt to potentially be indicative of a bowel obstruction and she was sent for a CT scan. This study, performed on 2022  was notable for a linear calcific density along the course of the proximal left ureter with associated moderate hydronephrosis potentially indicative of an intraureteral calculus. There was also stranding throughout the retroperitoneal soft tissues anterior  to the left psoas muscle with pelvic ascites. There was also wall thickening involving the descending colon. The question of colitis or serosal implants was raised. The patient had undergone a gastric sleeve placement several years prior. She had  also undergone a negative colonoscopy about 3 years prior to these presentations. There was a history of anemia ultimately resulting in the performance of a hysterectomy approximately 3 years ago for menorrhagia. Because of the CT scan findings, she  was ultimately referred to Dr. Shavon Galicia for evaluation of a possible pelvic malignancy. On 2022 he performed a laparoscopy and biopsy with evidence of peritoneal carcinomatosis grossly. A \"peritoneal nodule\" and a \"peritoneal implant\"  were both positive for a poorly differentiated metastatic carcinoma potentially consistent with an upper gastrointestinal primary.   An omental biopsy showed no evidence of malignancy. Immunohistochemistries were positive for cytokeratin 7 and negative  for cytokeratin 20. The tumor was weakly positive for CDX2. The only other positive study was MOC-31. Testing through [facility] demonstrated no mutation and ERB-B2. There was no microsatellite instability and no mismatch repair protein deficiencies. There were no targetable lesions. A PET scan was subsequently performed on  showing elevated FDG uptake in the left pelvis corresponding with a masslike density concerning  for peritoneal carcinomatosis. There was a small volume of free fluid within the peritoneal cavity. There was moderate right hydroureteronephrosis. Despite the pathologic findings, there was no evidence of FDG activity in the upper gastrointestinal  tract. CA-125 was slightly elevated at 44. Given the uncertainty of her diagnosis, the patient went back for additional surgery on February 15 at which time she underwent bilateral ureteral stent placement and bilateral salpingo-oophorectomies. Pathology  from that surgery was notable for poorly differentiated carcinoma with occasional signet ring features. Immunohistochemical stains were most consistent with a tumor of the upper gastrointestinal tract. She is  1 para 1 abortus 0. There is no  family history of cancer. She has had no difficulties with vomiting. She still notes that some food traverses her esophagus slowly. She had undergone a prior dilatation without any evidence of cancer. She subsequently began treatment with FOLFOX ultimately  with the addition of bevacizumab in 2022. She ultimately received 8 cycles of FOLFOX most of them with Avastin. On , she was taken to surgery for evaluation of debulking and HIPEC. Unfortunately, at the time of surgery her tumor was found to be tightly adherent and impossible to debulk.   Despite the FOLFOX, she developed progressive ascites necessitating placement of a Pleurx catheter. Based on this symptomatic progression, a decision was made to place her on paclitaxel and ramucirumab. In October, she was hospitalized for recurrent nausea and vomiting. She was placed on intravenous fluids and seen by gastroenterology. She underwent an esophageal dilatation as part of her EGD. Most importantly however a decision was made to place her on TPN. She returns today for follow-up and C4D1 Cyramza/Taxol. For her last cycle cyramaza was held due to increased urine protein. Overall, she is doing very well. There are no GI or bowel complaints. She has gained 3# since last visit, continues on TPN and has been trying to eat some by mouth. Energy level is OK. There is no cough or shortness of breath, trace left foot edema. No pain and neuropathy is stable. Abdominal pleurX in place, still only draining ~weekly. Denies any fevers or infectious symptoms.              Medications:   Current Outpatient Medications   Medication Sig Dispense Refill    pantoprazole (PROTONIX) 40 MG tablet Take 1 tablet by mouth in the morning and at bedtime 60 tablet 0    folic acid (FOLVITE) 1 MG tablet Take 1 tablet by mouth daily 30 tablet 0    vitamin D3 (CHOLECALCIFEROL) 10 MCG (400 UNIT) TABS tablet Take 2 tablets by mouth daily 60 tablet 0    sennosides-docusate sodium (SENOKOT-S) 8.6-50 MG tablet Take 2 tablets by mouth daily as needed for Constipation 90 tablet 0    ondansetron (ZOFRAN) 8 MG tablet Take 8 mg by mouth every 8 hours as needed      lactulose (CHRONULAC) 10 GM/15ML solution Take 30 mLs by mouth 2 times daily as needed (constipation) (Patient not taking: No sig reported) 473 mL 0    metoclopramide (REGLAN) 5 MG tablet Take 1 tablet by mouth 3 times daily (Patient not taking: Reported on 1/5/2023) 90 tablet 0    sucralfate (CARAFATE) 1 GM tablet Take 1 tablet by mouth 4 times daily (Patient not taking: Reported on 1/5/2023) 120 tablet 3    potassium chloride (KLOR-CON M) 20 MEQ extended release tablet Take 1 tablet by mouth 2 times daily (Patient not taking: No sig reported) 60 tablet 1    lidocaine-prilocaine (EMLA) 2.5-2.5 % cream PRN  Apply to port about 45 minutes prior to access (Patient not taking: No sig reported)       No current facility-administered medications for this visit. Facility-Administered Medications Ordered in Other Visits   Medication Dose Route Frequency Provider Last Rate Last Admin    sodium chloride flush 0.9 % injection 10 mL  10 mL IntraVENous PRN Austin Montes MD   10 mL at 01/05/23 0730       Allergies:  No Known Allergies    Review of Systems: The Review of Systems is documented in full in the internal medical record. All systems are negative other than for those noted above. Past Medical History:  Documented in electronic medical record    Past Surgical History:  Documented in electronic medical record    Social History:  Documented in electronic medical record    Family History:  Documented in electronic medical record      Physical Examination:  General Appearance: Mildly ill appearing patient in no acute distress. Vital signs: /89 (Site: Left Upper Arm, Position: Sitting, Cuff Size: Medium Adult)   Pulse 90   Temp 98.3 °F (36.8 °C) (Oral)   Resp 19   Ht 5' 4\" (1.626 m)   Wt 112 lb 9.6 oz (51.1 kg)   SpO2 100%   BMI 19.33 kg/m²     Performance status: ECOG Level:2  Distress  Screening Score: PHQ-9 Total Score: 0 (1/5/2023  7:45 AM)  Pain Score:   0 - No pain (fatigue - 3)    HEENT: No oral exam.  Neck: Supple. There is no thyromegaly. Lymph nodes: Deferred   Breasts: Not examined. Lungs: The lungs are clear to auscultation. There is no chest wall tenderness and no  use of accessory respiratory musculature. Heart: There is no jugular venous distention. The rate is normal and rhythm regular. The S1 and S2 are normal and there are no murmurs or rubs.     Abdomen: Soft, non-tender, distended, bowel sounds present and normal Abdominal Pleurx catheter in place. Skin: No rash, petechiae or ecchymoses. No evidence of malignancy. Extremities: No cyanosis, clubbing; trace lower extremity edema. Labs/Imaging:  Lab Results   Component Value Date/Time    WBC 6.3 01/05/2023 07:23 AM    HGB 8.1 01/05/2023 07:23 AM    HCT 27.9 01/05/2023 07:23 AM     01/05/2023 07:23 AM    MCV 88.6 01/05/2023 07:23 AM       Lab Results   Component Value Date/Time     01/05/2023 07:23 AM    K 4.4 01/05/2023 07:23 AM     01/05/2023 07:23 AM    CO2 28 01/05/2023 07:23 AM    BUN 25 01/05/2023 07:23 AM    GFRAA >60 09/27/2022 02:37 AM    GLOB 4.0 01/05/2023 07:23 AM    ALT 20 01/05/2023 07:23 AM       Above results reviewed with patient. ASSESSMENT:   Ms. Dimitris Morales has an apparent metastatic carcinoma of unknown primary. Pathologically, the tumor seems to resemble a process arising in the upper gastrointestinal tract. However, any such primary is  not visible on EGD or PET scanning. Dr. Mcgee Grounds presumption is that this may have originated from the colon based on its location and behavior. That too would be somewhat inconsistent with the pathology findings. She has been treated with FOLFOX and Avastin. There was certainly no symptomatic response and in fact she developed progressive and symptomatic ascites suggesting disease progression. She is now on TPN and receiving paclitaxel and ramucirumab. Although there have been no obvious radiographic changes, she does seem to be responding. The drainage of her ascites is much less. She is actually able to eat some food intake and nutrition bit by mouth. She continues to receive TPN. She seems a bit stronger. Protein calorie malnutrition-now on TPN      PLAN:  Proceed with C4D1 paclitaxel and ramucirumab. Continue home TPN through [facility] and can continue to try oral intake as tolerated. Return in 1 week for OV/C4D8.         RESUSCITATION DIRECTIVES/HOSPICE CARE;  Full Support        John Lopez ([facility]) [address] [place] [address] Hematology and Oncology  [address] [place] [address]  Office : [phone]  Fax : [phone]

## 2023-01-05 NOTE — PROGRESS NOTES
Arrived to the [facility]. Cyramza and Taxol completed. Patient tolerated well. Any issues or concerns during appointment: none. Patient aware of next infusion appointment on 1/12/23 (date) at 0900 (time). Patient aware of next lab and [facility] office visit on 1/9/23 (date) at [phone] (time). Patient instructed to call provider with temperature of 100.4 or greater or nausea/vomiting/ diarrhea or pain not controlled by medications  Discharged via wheelchair accompanied by family member.

## 2023-01-08 DIAGNOSIS — C80.1 METASTASIS TO PERITONEUM OF UNKNOWN PRIMARY (HCC): Primary | ICD-10-CM

## 2023-01-08 DIAGNOSIS — C78.6 METASTASIS TO PERITONEUM OF UNKNOWN PRIMARY (HCC): Primary | ICD-10-CM

## 2023-01-09 ENCOUNTER — HOSPITAL ENCOUNTER (OUTPATIENT)
Dept: LAB | Age: 48
Discharge: HOME | End: 2023-01-12
Payer: COMMERCIAL

## 2023-01-09 ENCOUNTER — OFFICE VISIT (OUTPATIENT)
Dept: ONCOLOGY | Age: 48
End: 2023-01-09
Payer: COMMERCIAL

## 2023-01-09 VITALS
SYSTOLIC BLOOD PRESSURE: 115 MMHG | DIASTOLIC BLOOD PRESSURE: 84 MMHG | BODY MASS INDEX: 19.19 KG/M2 | OXYGEN SATURATION: 98 % | RESPIRATION RATE: 16 BRPM | WEIGHT: 112.4 LBS | HEART RATE: 96 BPM | HEIGHT: 64 IN | TEMPERATURE: 99.8 F

## 2023-01-09 DIAGNOSIS — Z01.818 PRE-OP TESTING: Primary | ICD-10-CM

## 2023-01-09 DIAGNOSIS — Z01.818 PRE-OP TESTING: ICD-10-CM

## 2023-01-09 LAB
AMORPH CRY URNS QL MICRO: ABNORMAL
APPEARANCE UR: ABNORMAL
BACTERIA URNS QL MICRO: ABNORMAL /HPF
BILIRUB UR QL: NEGATIVE
CASTS URNS QL MICRO: 0 /LPF
COLOR UR: YELLOW
CRYSTALS URNS QL MICRO: 0 /LPF
EPI CELLS #/AREA URNS HPF: ABNORMAL /HPF
GLUCOSE UR STRIP.AUTO-MCNC: NEGATIVE MG/DL
HGB UR QL STRIP: ABNORMAL
KETONES UR QL STRIP.AUTO: NEGATIVE MG/DL
LEUKOCYTE ESTERASE UR QL STRIP.AUTO: ABNORMAL
MUCOUS THREADS URNS QL MICRO: 0 /LPF
NITRITE UR QL STRIP.AUTO: NEGATIVE
PH UR STRIP: 7 (ref 5–9)
PROT UR STRIP-MCNC: 100 MG/DL
RBC #/AREA URNS HPF: >100 /HPF
SP GR UR REFRACTOMETRY: 1.02 (ref 1–1.02)
UROBILINOGEN UR QL STRIP.AUTO: 1 EU/DL (ref 0.2–1)
WBC URNS QL MICRO: >100 /HPF
YEAST URNS QL MICRO: ABNORMAL

## 2023-01-09 PROCEDURE — 87086 URINE CULTURE/COLONY COUNT: CPT

## 2023-01-09 PROCEDURE — 99213 OFFICE O/P EST LOW 20 MIN: CPT | Performed by: UROLOGY

## 2023-01-09 PROCEDURE — 81001 URINALYSIS AUTO W/SCOPE: CPT

## 2023-01-09 ASSESSMENT — PATIENT HEALTH QUESTIONNAIRE - PHQ9
1. LITTLE INTEREST OR PLEASURE IN DOING THINGS: 0
SUM OF ALL RESPONSES TO PHQ QUESTIONS 1-9: 0
SUM OF ALL RESPONSES TO PHQ9 QUESTIONS 1 & 2: 0
2. FEELING DOWN, DEPRESSED OR HOPELESS: 0
SUM OF ALL RESPONSES TO PHQ QUESTIONS 1-9: 0

## 2023-01-09 ASSESSMENT — ENCOUNTER SYMPTOMS
GASTROINTESTINAL NEGATIVE: 1
RESPIRATORY NEGATIVE: 1

## 2023-01-10 ENCOUNTER — PREP FOR PROCEDURE (OUTPATIENT)
Dept: ONCOLOGY | Age: 48
End: 2023-01-10

## 2023-01-11 LAB
BACTERIA SPEC CULT: ABNORMAL
SERVICE CMNT-IMP: ABNORMAL

## 2023-01-12 ENCOUNTER — HOSPITAL ENCOUNTER (OUTPATIENT)
Dept: INFUSION THERAPY | Age: 48
Discharge: HOME | End: 2023-01-12
Payer: COMMERCIAL

## 2023-01-12 ENCOUNTER — CLINICAL DOCUMENTATION (OUTPATIENT)
Dept: ONCOLOGY | Age: 48
End: 2023-01-12

## 2023-01-12 ENCOUNTER — OFFICE VISIT (OUTPATIENT)
Dept: ONCOLOGY | Age: 48
End: 2023-01-12
Payer: COMMERCIAL

## 2023-01-12 ENCOUNTER — OFFICE VISIT (OUTPATIENT)
Dept: ONCOLOGY | Age: 48
End: 2023-01-12

## 2023-01-12 ENCOUNTER — CLINICAL DOCUMENTATION (OUTPATIENT)
Dept: CASE MANAGEMENT | Age: 48
End: 2023-01-12

## 2023-01-12 VITALS
WEIGHT: 115.7 LBS | OXYGEN SATURATION: 99 % | DIASTOLIC BLOOD PRESSURE: 80 MMHG | HEART RATE: 97 BPM | TEMPERATURE: 98.7 F | BODY MASS INDEX: 19.75 KG/M2 | RESPIRATION RATE: 16 BRPM | SYSTOLIC BLOOD PRESSURE: 109 MMHG | HEIGHT: 64 IN

## 2023-01-12 VITALS — OXYGEN SATURATION: 99 %

## 2023-01-12 DIAGNOSIS — Z00.8 NUTRITIONAL ASSESSMENT: Primary | ICD-10-CM

## 2023-01-12 DIAGNOSIS — C80.1 CARCINOMA OF UNKNOWN PRIMARY (HCC): ICD-10-CM

## 2023-01-12 DIAGNOSIS — C78.6 METASTASIS TO PERITONEUM OF UNKNOWN PRIMARY (HCC): Primary | ICD-10-CM

## 2023-01-12 DIAGNOSIS — Z78.9 ON TOTAL PARENTERAL NUTRITION (TPN): ICD-10-CM

## 2023-01-12 DIAGNOSIS — C80.1 METASTASIS TO PERITONEUM OF UNKNOWN PRIMARY (HCC): ICD-10-CM

## 2023-01-12 DIAGNOSIS — C78.6 METASTASIS TO PERITONEUM OF UNKNOWN PRIMARY (HCC): ICD-10-CM

## 2023-01-12 DIAGNOSIS — C80.1 CARCINOMA OF UNKNOWN PRIMARY (HCC): Primary | ICD-10-CM

## 2023-01-12 DIAGNOSIS — C80.1 METASTASIS TO PERITONEUM OF UNKNOWN PRIMARY (HCC): Primary | ICD-10-CM

## 2023-01-12 LAB
ALBUMIN SERPL-MCNC: 2.4 G/DL (ref 3.5–5)
ALBUMIN/GLOB SERPL: 0.6 (ref 0.4–1.6)
ALP SERPL-CCNC: 69 U/L (ref 50–136)
ALT SERPL-CCNC: 18 U/L (ref 12–65)
ANION GAP SERPL CALC-SCNC: 9 MMOL/L (ref 2–11)
AST SERPL-CCNC: 19 U/L (ref 15–37)
BASOPHILS # BLD: 0 K/UL (ref 0–0.2)
BASOPHILS NFR BLD: 1 % (ref 0–2)
BILIRUB SERPL-MCNC: 0.2 MG/DL (ref 0.2–1.1)
BUN SERPL-MCNC: 25 MG/DL (ref 6–23)
CALCIUM SERPL-MCNC: 9.1 MG/DL (ref 8.3–10.4)
CEA SERPL-MCNC: 1.8 NG/ML (ref 0–3)
CHLORIDE SERPL-SCNC: 104 MMOL/L (ref 101–110)
CO2 SERPL-SCNC: 26 MMOL/L (ref 21–32)
CREAT SERPL-MCNC: 0.5 MG/DL (ref 0.6–1)
DIFFERENTIAL METHOD BLD: ABNORMAL
EOSINOPHIL # BLD: 0.2 K/UL (ref 0–0.8)
EOSINOPHIL NFR BLD: 3 % (ref 0.5–7.8)
ERYTHROCYTE [DISTWIDTH] IN BLOOD BY AUTOMATED COUNT: 19.7 % (ref 11.9–14.6)
GLOBULIN SER CALC-MCNC: 4.2 G/DL (ref 2.8–4.5)
GLUCOSE SERPL-MCNC: 96 MG/DL (ref 65–100)
HCT VFR BLD AUTO: 24.7 % (ref 35.8–46.3)
HGB BLD-MCNC: 7.3 G/DL (ref 11.7–15.4)
IMM GRANULOCYTES # BLD AUTO: 0.1 K/UL (ref 0–0.5)
IMM GRANULOCYTES NFR BLD AUTO: 1 % (ref 0–5)
LYMPHOCYTES # BLD: 1.4 K/UL (ref 0.5–4.6)
LYMPHOCYTES NFR BLD: 22 % (ref 13–44)
MAGNESIUM SERPL-MCNC: 2.4 MG/DL (ref 1.8–2.4)
MCH RBC QN AUTO: 25.9 PG (ref 26.1–32.9)
MCHC RBC AUTO-ENTMCNC: 29.6 G/DL (ref 31.4–35)
MCV RBC AUTO: 87.6 FL (ref 82–102)
MONOCYTES # BLD: 0.5 K/UL (ref 0.1–1.3)
MONOCYTES NFR BLD: 8 % (ref 4–12)
NEUTS SEG # BLD: 4.1 K/UL (ref 1.7–8.2)
NEUTS SEG NFR BLD: 65 % (ref 43–78)
NRBC # BLD: 0.04 K/UL (ref 0–0.2)
PLATELET # BLD AUTO: 388 K/UL (ref 150–450)
PMV BLD AUTO: 10.3 FL (ref 9.4–12.3)
POTASSIUM SERPL-SCNC: 4.3 MMOL/L (ref 3.5–5.1)
PROT SERPL-MCNC: 6.6 G/DL (ref 6.3–8.2)
RBC # BLD AUTO: 2.82 M/UL (ref 4.05–5.2)
SODIUM SERPL-SCNC: 139 MMOL/L (ref 133–143)
WBC # BLD AUTO: 6.2 K/UL (ref 4.3–11.1)

## 2023-01-12 PROCEDURE — 80053 COMPREHEN METABOLIC PANEL: CPT

## 2023-01-12 PROCEDURE — 2580000003 HC RX 258: Performed by: INTERNAL MEDICINE

## 2023-01-12 PROCEDURE — 83735 ASSAY OF MAGNESIUM: CPT

## 2023-01-12 PROCEDURE — 99214 OFFICE O/P EST MOD 30 MIN: CPT | Performed by: NURSE PRACTITIONER

## 2023-01-12 PROCEDURE — 36591 DRAW BLOOD OFF VENOUS DEVICE: CPT

## 2023-01-12 PROCEDURE — 85025 COMPLETE CBC W/AUTO DIFF WBC: CPT

## 2023-01-12 PROCEDURE — 96413 CHEMO IV INFUSION 1 HR: CPT

## 2023-01-12 PROCEDURE — 2500000003 HC RX 250 WO HCPCS: Performed by: NURSE PRACTITIONER

## 2023-01-12 PROCEDURE — 2580000003 HC RX 258: Performed by: NURSE PRACTITIONER

## 2023-01-12 PROCEDURE — 6360000002 HC RX W HCPCS: Performed by: NURSE PRACTITIONER

## 2023-01-12 PROCEDURE — A4216 STERILE WATER/SALINE, 10 ML: HCPCS | Performed by: NURSE PRACTITIONER

## 2023-01-12 PROCEDURE — 82378 CARCINOEMBRYONIC ANTIGEN: CPT

## 2023-01-12 PROCEDURE — 96375 TX/PRO/DX INJ NEW DRUG ADDON: CPT

## 2023-01-12 RX ORDER — ACETAMINOPHEN 325 MG/1
650 TABLET ORAL
Status: CANCELLED | OUTPATIENT
Start: 2023-01-12

## 2023-01-12 RX ORDER — ONDANSETRON 2 MG/ML
8 INJECTION INTRAMUSCULAR; INTRAVENOUS ONCE
Status: CANCELLED | OUTPATIENT
Start: 2023-01-12 | End: 2023-01-12

## 2023-01-12 RX ORDER — SODIUM CHLORIDE 0.9 % (FLUSH) 0.9 %
10 SYRINGE (ML) INJECTION PRN
Status: DISCONTINUED | OUTPATIENT
Start: 2023-01-12 | End: 2023-01-13 | Stop reason: HOSPADM

## 2023-01-12 RX ORDER — ALBUTEROL SULFATE 90 UG/1
4 AEROSOL, METERED RESPIRATORY (INHALATION) PRN
Status: CANCELLED | OUTPATIENT
Start: 2023-01-12

## 2023-01-12 RX ORDER — FAMOTIDINE 10 MG/ML
20 INJECTION, SOLUTION INTRAVENOUS
Status: CANCELLED | OUTPATIENT
Start: 2023-01-12

## 2023-01-12 RX ORDER — SODIUM CHLORIDE 9 MG/ML
INJECTION, SOLUTION INTRAVENOUS CONTINUOUS
Status: CANCELLED | OUTPATIENT
Start: 2023-01-12

## 2023-01-12 RX ORDER — MEPERIDINE HYDROCHLORIDE 50 MG/ML
12.5 INJECTION INTRAMUSCULAR; INTRAVENOUS; SUBCUTANEOUS PRN
Status: CANCELLED | OUTPATIENT
Start: 2023-01-12

## 2023-01-12 RX ORDER — SODIUM CHLORIDE 0.9 % (FLUSH) 0.9 %
5-40 SYRINGE (ML) INJECTION PRN
Status: DISCONTINUED | OUTPATIENT
Start: 2023-01-12 | End: 2023-01-13 | Stop reason: HOSPADM

## 2023-01-12 RX ORDER — SODIUM CHLORIDE 9 MG/ML
5-250 INJECTION, SOLUTION INTRAVENOUS PRN
Status: CANCELLED | OUTPATIENT
Start: 2023-01-12

## 2023-01-12 RX ORDER — ONDANSETRON 2 MG/ML
8 INJECTION INTRAMUSCULAR; INTRAVENOUS ONCE
Status: COMPLETED | OUTPATIENT
Start: 2023-01-12 | End: 2023-01-12

## 2023-01-12 RX ORDER — DIPHENHYDRAMINE HYDROCHLORIDE 50 MG/ML
50 INJECTION INTRAMUSCULAR; INTRAVENOUS ONCE
Status: COMPLETED | OUTPATIENT
Start: 2023-01-12 | End: 2023-01-12

## 2023-01-12 RX ORDER — ONDANSETRON 2 MG/ML
8 INJECTION INTRAMUSCULAR; INTRAVENOUS
Status: CANCELLED | OUTPATIENT
Start: 2023-01-12

## 2023-01-12 RX ORDER — DEXAMETHASONE SODIUM PHOSPHATE 10 MG/ML
10 INJECTION INTRAMUSCULAR; INTRAVENOUS ONCE
Status: COMPLETED | OUTPATIENT
Start: 2023-01-12 | End: 2023-01-12

## 2023-01-12 RX ORDER — DIPHENHYDRAMINE HYDROCHLORIDE 50 MG/ML
50 INJECTION INTRAMUSCULAR; INTRAVENOUS
Status: CANCELLED | OUTPATIENT
Start: 2023-01-12

## 2023-01-12 RX ORDER — SODIUM CHLORIDE 9 MG/ML
5-250 INJECTION, SOLUTION INTRAVENOUS PRN
Status: DISCONTINUED | OUTPATIENT
Start: 2023-01-12 | End: 2023-01-13 | Stop reason: HOSPADM

## 2023-01-12 RX ORDER — SODIUM CHLORIDE 9 MG/ML
5-40 INJECTION INTRAVENOUS PRN
Status: CANCELLED | OUTPATIENT
Start: 2023-01-12

## 2023-01-12 RX ORDER — DIPHENHYDRAMINE HYDROCHLORIDE 50 MG/ML
50 INJECTION INTRAMUSCULAR; INTRAVENOUS ONCE
Status: CANCELLED | OUTPATIENT
Start: 2023-01-12 | End: 2023-01-12

## 2023-01-12 RX ORDER — HEPARIN SODIUM (PORCINE) LOCK FLUSH IV SOLN 100 UNIT/ML 100 UNIT/ML
500 SOLUTION INTRAVENOUS PRN
Status: CANCELLED | OUTPATIENT
Start: 2023-01-12

## 2023-01-12 RX ORDER — FAMOTIDINE 10 MG/ML
20 INJECTION, SOLUTION INTRAVENOUS ONCE
Status: CANCELLED | OUTPATIENT
Start: 2023-01-12 | End: 2023-01-12

## 2023-01-12 RX ORDER — SODIUM CHLORIDE 0.9 % (FLUSH) 0.9 %
5-40 SYRINGE (ML) INJECTION PRN
Status: CANCELLED | OUTPATIENT
Start: 2023-01-12

## 2023-01-12 RX ORDER — EPINEPHRINE 1 MG/ML
0.3 INJECTION, SOLUTION, CONCENTRATE INTRAVENOUS PRN
Status: CANCELLED | OUTPATIENT
Start: 2023-01-12

## 2023-01-12 RX ADMIN — FAMOTIDINE 20 MG: 10 INJECTION, SOLUTION INTRAVENOUS at 09:41

## 2023-01-12 RX ADMIN — SODIUM CHLORIDE, PRESERVATIVE FREE 10 ML: 5 INJECTION INTRAVENOUS at 09:00

## 2023-01-12 RX ADMIN — DIPHENHYDRAMINE HYDROCHLORIDE 50 MG: 50 INJECTION, SOLUTION INTRAMUSCULAR; INTRAVENOUS at 09:39

## 2023-01-12 RX ADMIN — ONDANSETRON 8 MG: 2 INJECTION INTRAMUSCULAR; INTRAVENOUS at 09:43

## 2023-01-12 RX ADMIN — DEXAMETHASONE SODIUM PHOSPHATE 10 MG: 10 INJECTION INTRAMUSCULAR; INTRAVENOUS at 09:45

## 2023-01-12 RX ADMIN — SODIUM CHLORIDE 100 ML/HR: 9 INJECTION, SOLUTION INTRAVENOUS at 09:30

## 2023-01-12 RX ADMIN — SODIUM CHLORIDE, PRESERVATIVE FREE 10 ML: 5 INJECTION INTRAVENOUS at 08:15

## 2023-01-12 RX ADMIN — SODIUM CHLORIDE, PRESERVATIVE FREE 10 ML: 5 INJECTION INTRAVENOUS at 11:25

## 2023-01-12 RX ADMIN — PACLITAXEL 120 MG: 6 INJECTION, SOLUTION INTRAVENOUS at 10:10

## 2023-01-12 ASSESSMENT — PATIENT HEALTH QUESTIONNAIRE - PHQ9
2. FEELING DOWN, DEPRESSED OR HOPELESS: 0
SUM OF ALL RESPONSES TO PHQ9 QUESTIONS 1 & 2: 0
SUM OF ALL RESPONSES TO PHQ QUESTIONS 1-9: 0
1. LITTLE INTEREST OR PLEASURE IN DOING THINGS: 0
SUM OF ALL RESPONSES TO PHQ QUESTIONS 1-9: 0

## 2023-01-12 NOTE — PROGRESS NOTES
Arrived to the [facility]. Taxol completed. Patient tolerated without difficulty. Any issues or concerns during appointment: None. Patient aware of next infusion appointment on Jan 19 (date) at 0900 (time). Patient instructed to call provider with temperature of 100.4 or greater or nausea/vomiting/ diarrhea or pain not controlled by medications  Discharged ambulatory.

## 2023-01-12 NOTE — PROGRESS NOTES
1/12/23 saw pt today with PORSHA Borges for pre chemo cycle 4 day 8 taxol/cyramza. She is tolerating chemo well. Weight is up 3 more pounds. Continues on TPN 12 hours per day. Proceed to infusion. Follow up in 1 week. Encouraged to call with any concerns. Navigation will continue to follow.

## 2023-01-12 NOTE — PATIENT INSTRUCTIONS
Patient Instructions from Today's Visit    Reason for Visit:  Pre chemo cycle 4 day 8 taxol/cyramza   On TPN 12 hours per day     Plan:  Proceed to infusion     Follow Up:  1 week     Recent Lab Results:  Hospital Outpatient Visit on 01/12/2023   Component Date Value Ref Range Status    WBC 01/12/2023 6.2  4.3 - 11.1 K/uL Final    RBC 01/12/2023 2.82 (A)  4.05 - 5.2 M/uL Final    Hemoglobin 01/12/2023 7.3 (A)  11.7 - 15.4 g/dL Final    Hematocrit 01/12/2023 24.7 (A)  35.8 - 46.3 % Final    MCV 01/12/2023 87.6  82.0 - 102.0 FL Final    MCH 01/12/2023 25.9 (A)  26.1 - 32.9 PG Final    MCHC 01/12/2023 29.6 (A)  31.4 - 35.0 g/dL Final    RDW 01/12/2023 19.7 (A)  11.9 - 14.6 % Final    Platelets 78/00/0794 388  150 - 450 K/uL Final    MPV 01/12/2023 10.3  9.4 - 12.3 FL Final    nRBC 01/12/2023 0.04  0.0 - 0.2 K/uL Final    **Note: Absolute NRBC parameter is now reported with Hemogram**    Seg Neutrophils 01/12/2023 65  43 - 78 % Final    Lymphocytes 01/12/2023 22  13 - 44 % Final    Monocytes 01/12/2023 8  4.0 - 12.0 % Final    Eosinophils % 01/12/2023 3  0.5 - 7.8 % Final    Basophils 01/12/2023 1  0.0 - 2.0 % Final    Immature Granulocytes 01/12/2023 1  0.0 - 5.0 % Final    Segs Absolute 01/12/2023 4.1  1.7 - 8.2 K/UL Final    Absolute Lymph # 01/12/2023 1.4  0.5 - 4.6 K/UL Final    Absolute Mono # 01/12/2023 0.5  0.1 - 1.3 K/UL Final    Absolute Eos # 01/12/2023 0.2  0.0 - 0.8 K/UL Final    Basophils Absolute 01/12/2023 0.0  0.0 - 0.2 K/UL Final    Absolute Immature Granulocyte 01/12/2023 0.1  0.0 - 0.5 K/UL Final    Differential Type 01/12/2023 AUTOMATED    Final         Treatment Summary has been discussed and given to patient: no        -------------------------------------------------------------------------------------------------------------------  Please call our office at [phone] if you have any  of the following symptoms:   Fever of 100.5 or greater  Chills  Shortness of breath  Swelling or pain in one leg    After office hours an answering service is available and will contact a provider for emergencies or if you are experiencing any of the above symptoms. Patient did express an interest in My Chart. My Chart log in information explained on the after visit summary printout at the [facility] [address] desk.     Elio Francisco RN, BSN  Nurse Navigator  [phone] cell  [email or web address]

## 2023-01-12 NOTE — PROGRESS NOTES
HEMATOLOGY/ONCOLOGY FOLLOWUP  VISIT      Patient Name: Betsy Fisher             Date of Visit: 2023  : 1975  Age:47 y.o. Presenting Complaint:  Betsy Fisher  is seen in follow-up for carcinoma of unknown primary. History of Present Illness:  Ms. Felix Castillo was seen for the first time in our office in 2022. She was a woman of previously good health who began noticing left-sided back discomfort in early 2022. This was associated  ultimately with a sense of difficulty swallowing and ultimately she presented to [facility] for evaluation. Her symptoms were felt to potentially be indicative of a bowel obstruction and she was sent for a CT scan. This study, performed on 2022  was notable for a linear calcific density along the course of the proximal left ureter with associated moderate hydronephrosis potentially indicative of an intraureteral calculus. There was also stranding throughout the retroperitoneal soft tissues anterior  to the left psoas muscle with pelvic ascites. There was also wall thickening involving the descending colon. The question of colitis or serosal implants was raised. The patient had undergone a gastric sleeve placement several years prior. She had  also undergone a negative colonoscopy about 3 years prior to these presentations. There was a history of anemia ultimately resulting in the performance of a hysterectomy approximately 3 years ago for menorrhagia. Because of the CT scan findings, she  was ultimately referred to Dr. Cory De La Cruz for evaluation of a possible pelvic malignancy. On 2022 he performed a laparoscopy and biopsy with evidence of peritoneal carcinomatosis grossly. A \"peritoneal nodule\" and a \"peritoneal implant\"  were both positive for a poorly differentiated metastatic carcinoma potentially consistent with an upper gastrointestinal primary.   An omental biopsy showed no evidence of malignancy. Immunohistochemistries were positive for cytokeratin 7 and negative  for cytokeratin 20. The tumor was weakly positive for CDX2. The only other positive study was MOC-31. Testing through [employer] demonstrated no mutation and ERB-B2. There was no microsatellite instability and no mismatch repair protein deficiencies. There were no targetable lesions. A PET scan was subsequently performed on  showing elevated FDG uptake in the left pelvis corresponding with a masslike density concerning  for peritoneal carcinomatosis. There was a small volume of free fluid within the peritoneal cavity. There was moderate right hydroureteronephrosis. Despite the pathologic findings, there was no evidence of FDG activity in the upper gastrointestinal  tract. CA-125 was slightly elevated at 44. Given the uncertainty of her diagnosis, the patient went back for additional surgery on February 15 at which time she underwent bilateral ureteral stent placement and bilateral salpingo-oophorectomies. Pathology  from that surgery was notable for poorly differentiated carcinoma with occasional signet ring features. Immunohistochemical stains were most consistent with a tumor of the upper gastrointestinal tract. She is  1 para 1 abortus 0. There is no  family history of cancer. She has had no difficulties with vomiting. She still notes that some food traverses her esophagus slowly. She had undergone a prior dilatation without any evidence of cancer. She subsequently began treatment with FOLFOX ultimately  with the addition of bevacizumab in 2022. She ultimately received 8 cycles of FOLFOX most of them with Avastin. On , she was taken to surgery for evaluation of debulking and HIPEC. Unfortunately, at the time of surgery her tumor was found to be tightly adherent and impossible to debulk.   Despite the FOLFOX, she developed progressive ascites necessitating placement of a Pleurx catheter. Based on this symptomatic progression, a decision was made to place her on paclitaxel and ramucirumab. In October, she was hospitalized for recurrent nausea and vomiting. She was placed on intravenous fluids and seen by gastroenterology. She underwent an esophageal dilatation as part of her EGD. Most importantly however a decision was made to place her on TPN. She returns today for follow-up and C4D8 Cyramza/Taxol. Overall, she continues to tolerate treatment well. There are no GI or bowel complaints. Weight is up another 3#, eating some by mouth, remains on TPN 12 hours daily. Fatigue is ongoing. There is no cough, shortness of breath, or edema. Neuropathy is stable, no pain. Abd pleurX drainage is stable. She denies any fevers or infectious symptoms.               Medications:   Current Outpatient Medications   Medication Sig Dispense Refill    pantoprazole (PROTONIX) 40 MG tablet Take 1 tablet by mouth in the morning and at bedtime 60 tablet 0    sennosides-docusate sodium (SENOKOT-S) 8.6-50 MG tablet Take 2 tablets by mouth daily as needed for Constipation 90 tablet 0    ondansetron (ZOFRAN) 8 MG tablet Take 8 mg by mouth every 8 hours as needed      folic acid (FOLVITE) 1 MG tablet Take 1 tablet by mouth daily (Patient not taking: Reported on 1/12/2023) 30 tablet 0    lactulose (CHRONULAC) 10 GM/15ML solution Take 30 mLs by mouth 2 times daily as needed (constipation) (Patient not taking: No sig reported) 473 mL 0    metoclopramide (REGLAN) 5 MG tablet Take 1 tablet by mouth 3 times daily (Patient not taking: No sig reported) 90 tablet 0    vitamin D3 (CHOLECALCIFEROL) 10 MCG (400 UNIT) TABS tablet Take 2 tablets by mouth daily (Patient not taking: Reported on 1/12/2023) 60 tablet 0    sucralfate (CARAFATE) 1 GM tablet Take 1 tablet by mouth 4 times daily (Patient not taking: No sig reported) 120 tablet 3    potassium chloride (KLOR-CON M) 20 MEQ extended release tablet Take 1 tablet by mouth 2 times daily (Patient not taking: No sig reported) 60 tablet 1    lidocaine-prilocaine (EMLA) 2.5-2.5 % cream PRN  Apply to port about 45 minutes prior to access (Patient not taking: No sig reported)       No current facility-administered medications for this visit. Facility-Administered Medications Ordered in Other Visits   Medication Dose Route Frequency Provider Last Rate Last Admin    sodium chloride flush 0.9 % injection 10 mL  10 mL IntraVENous PRN Carlos Lopez MD   10 mL at 01/12/23 0815    0.9 % sodium chloride infusion  5-250 mL/hr IntraVENous PRN SCOTT Amaya - CNP   Stopped at 01/12/23 8359    PACLitaxel (TAXOL) 120 mg in sodium chloride 0.9 % 250 mL chemo infusion  80 mg/m2 (Treatment Plan Recorded) IntraVENous Once SCOTT Amaya - CNP        sodium chloride flush 0.9 % injection 5-40 mL  5-40 mL IntraVENous PRN Tiffanie Baker APRN - CNP   10 mL at 01/12/23 0900       Allergies:  No Known Allergies    Review of Systems: The Review of Systems is documented in full in the internal medical record. All systems are negative other than for those noted above. Past Medical History:  Documented in electronic medical record    Past Surgical History:  Documented in electronic medical record    Social History:  Documented in electronic medical record    Family History:  Documented in electronic medical record      Physical Examination:  General Appearance: Mildly ill appearing patient in no acute distress. Vital signs: /80 (Site: Right Upper Arm, Position: Sitting, Cuff Size: Medium Adult)   Pulse 97   Temp 98.7 °F (37.1 °C) (Oral)   Resp 16   Ht 5' 4\" (1.626 m)   Wt 115 lb 11.2 oz (52.5 kg)   SpO2 99%   BMI 19.86 kg/m²     Performance status: ECOG Level:2  Distress  Screening Score: PHQ-9 Total Score: 0 (1/12/2023  8:24 AM)  Pain Score:   0 - No pain (fatigue-0)    HEENT: No oral exam.  Neck: Supple. There is no thyromegaly.    Lymph nodes: Deferred Breasts: Not examined. Lungs: The lungs are clear to auscultation. There is no chest wall tenderness and no  use of accessory respiratory musculature. Heart: There is no jugular venous distention. The rate is normal and rhythm regular. The S1 and S2 are normal and there are no murmurs or rubs. Abdomen: Soft, non-tender, distended, bowel sounds present and normal  Abdominal Pleurx catheter in place. Skin: No rash, petechiae or ecchymoses. No evidence of malignancy. Extremities: No cyanosis, clubbing; trace lower extremity edema. Labs/Imaging:  Lab Results   Component Value Date/Time    WBC 6.2 01/12/2023 08:08 AM    HGB 7.3 01/12/2023 08:08 AM    HCT 24.7 01/12/2023 08:08 AM     01/12/2023 08:08 AM    MCV 87.6 01/12/2023 08:08 AM       Lab Results   Component Value Date/Time     01/12/2023 08:08 AM    K 4.3 01/12/2023 08:08 AM     01/12/2023 08:08 AM    CO2 26 01/12/2023 08:08 AM    BUN 25 01/12/2023 08:08 AM    GFRAA >60 09/27/2022 02:37 AM    GLOB 4.2 01/12/2023 08:08 AM    ALT 18 01/12/2023 08:08 AM       Above results reviewed with patient. ASSESSMENT:  Ms. Jennifer Allison has an apparent metastatic carcinoma of unknown primary. Pathologically, the tumor seems to resemble a process arising in the upper gastrointestinal tract. However, any such primary is  not visible on EGD or PET scanning. Dr. Gutierrez Course presumption is that this may have originated from the colon based on its location and behavior. That too would be somewhat inconsistent with the pathology findings. She has been treated with FOLFOX and Avastin. There was certainly no symptomatic response and in fact she developed progressive and symptomatic ascites suggesting disease progression. She is now on TPN and receiving paclitaxel and ramucirumab. Although there have been no obvious radiographic changes, she does seem to be responding. The drainage of her ascites is much less.   She is actually able to eat some food intake and nutrition bit by mouth. She continues to receive TPN. She seems a bit stronger. Protein calorie malnutrition-now on TPN    PLAN:  Proceed with C4D8 paclitaxel alone today. Continue home TPN through [facility] and can continue to try oral intake as tolerated. Return in 1 week for OV/C4D15.         RESUSCITATION DIRECTIVES/HOSPICE CARE;  Full Support        Girma Jackson ([facility]) 1975 [address] [place] [address] Hematology and Oncology  [address]  Office : [phone]  Fax : [phone]

## 2023-01-12 NOTE — PROGRESS NOTES
Clinical Social Work Note  Name: Oscar Spain  : 1975  MRN: [id number]  Date of Service: 2023  Type of Service: Behavioral Health   Length of Service: 16 minutes      Subject: Seen patient with her mother during infusion, provided therapeutic reassurance. Patient was reserved, denied any form of distress. Oscar Spain was dressed properly. No abnormal psychomotor movements observed. Intellectual functioning appeared to be intact. Insight was adequate. Judgment was adequate. Patient did not report suicidal ideations, intent or plans. Speech was coherent. Thought process was clear. Patient did not report homicidal ideations, intent or plans. Patient was oriented to self, place, time and situation. Protective Factors: Current care for physical and mental illness, adequate insight and judgment, family support, cultural and Buddhist beliefs and values that support self-care. Next Steps: NOLBERTO-JENNIFER gave contact information and encouraged pt to call should any needs arise. Pt verbalized understanding. NOLBERTO-JENNIFER intends to follow up as needed.       Electronically Signed By:  NOLBERTO Dior

## 2023-01-13 RX ORDER — CEPHALEXIN 500 MG/1
500 CAPSULE ORAL 4 TIMES DAILY
Qty: 12 CAPSULE | Refills: 0 | Status: SHIPPED | OUTPATIENT
Start: 2023-01-13

## 2023-01-13 NOTE — PROGRESS NOTES
Nutrition F/U:  Assessment:  Pt seen during office visit w/ NP, receiving Taxol today.  Pt remains TPN dependent for estimated nutrition needs, managed by [facility], infusing over 12 hrs daily.  Pt reports eating efrain rice, cooked vegetables, and eggs, continues to tolerate liquids po as well, denies N/V/D w/ po intake now.  Pt continues to drain abdominal pleurx drain every 1-2 weeks w/ 100-200 ml output noted.  Pt appears depressed today, tearful at times during today's office visit. Labs notable for BUN (25), Cr (0.5), other nutrition related lab values WNL.  Current BW: 115#, up 6# over the past 3 weeks.     Intervention:  1. Continue w/ po diet as tolerated   2. Continue w/ current TPN     Monitoring/Evaluation:  1. RD to follow up during next office visit - follow up wt status, tolerance/intake of po diet, nutrition related lab values w/ TPN, symptom management.      Lenora Huffman RD, , LD  [phone]

## 2023-01-13 NOTE — TELEPHONE ENCOUNTER
Per  rx for Keflex QID x3days to be sent to pharmacy. Pt to start three days prior to next stent exchange.  Paras Dahl

## 2023-01-18 NOTE — PERIOP NOTE
Patient verified name and . Patient mostly answered yes or no to the medical history questions and not taking to all medications except zofran and protonix. Order for consent NOT found in EHR at time of PAT visit. Unable to verify case posting against order; surgery verified by patient. Type 1B surgery, phone assessment complete. Orders not received. Labs per surgeon: none ordered  Labs per anesthesia protocol: Hgb 7.3 23. Dr. Vidal Sue noted Hgb 8.1 at 23 office visit. Spoke with Hope at Dr. Yusuf Cellar office, notified of 23 Hgb 7.3. Chart placed for anesthesia review, per RD note TPN dependent and Hgb 7.3 currently on chemo. Patient answered medical/surgical history questions at their best of ability. All prior to admission medications documented in [facility]. Patient instructed to take the following medications the day of surgery according to anesthesia guidelines with a small sip of water: protonix. Tylenol instructions not given-- weight 49 Kg  Hold all vitamins 7 days prior to surgery and NSAIDS 5 days prior to surgery. Prescription meds to hold: none    Patient instructed on the following:    > Arrive at main Entrance, time of arrival to be called the day before by 1700  > NPO after midnight, unless otherwise indicated, including gum, mints, and ice chips  > Responsible adult must drive patient to the hospital, stay during surgery, and patient will need supervision 24 hours after anesthesia  > Use antibacterial soap in shower the night before surgery and on the morning of surgery  > All piercings must be removed prior to arrival.    > Leave all valuables (money and jewelry) at home but bring insurance card and ID on DOS.   > You may be required to pay a deductible or co-pay on the day of your procedure. You can pre-pay by calling [phone] if your surgery is at the [facility] or [phone] if your surgery is at the [facility].   > Do not wear make-up, nail polish, lotions, cologne, perfumes, powders, or oil on skin. Artificial nails are not permitted.

## 2023-01-19 ENCOUNTER — HOSPITAL ENCOUNTER (OUTPATIENT)
Dept: INFUSION THERAPY | Age: 48
Discharge: HOME | End: 2023-01-19
Payer: COMMERCIAL

## 2023-01-19 ENCOUNTER — CLINICAL DOCUMENTATION (OUTPATIENT)
Dept: CASE MANAGEMENT | Age: 48
End: 2023-01-19

## 2023-01-19 ENCOUNTER — CLINICAL DOCUMENTATION (OUTPATIENT)
Dept: ONCOLOGY | Age: 48
End: 2023-01-19

## 2023-01-19 ENCOUNTER — OFFICE VISIT (OUTPATIENT)
Dept: ONCOLOGY | Age: 48
End: 2023-01-19
Payer: COMMERCIAL

## 2023-01-19 VITALS
HEIGHT: 64 IN | SYSTOLIC BLOOD PRESSURE: 121 MMHG | TEMPERATURE: 99.2 F | HEART RATE: 87 BPM | DIASTOLIC BLOOD PRESSURE: 86 MMHG | OXYGEN SATURATION: 100 % | RESPIRATION RATE: 18 BRPM | BODY MASS INDEX: 19.5 KG/M2 | WEIGHT: 114.2 LBS

## 2023-01-19 DIAGNOSIS — C80.1 METASTASIS TO PERITONEUM OF UNKNOWN PRIMARY (HCC): Primary | ICD-10-CM

## 2023-01-19 DIAGNOSIS — C78.6 PERITONEAL CARCINOMATOSIS (HCC): ICD-10-CM

## 2023-01-19 DIAGNOSIS — C80.1 CARCINOMA OF UNKNOWN PRIMARY (HCC): Primary | ICD-10-CM

## 2023-01-19 DIAGNOSIS — C80.1 CARCINOMA OF UNKNOWN PRIMARY (HCC): ICD-10-CM

## 2023-01-19 DIAGNOSIS — T45.1X5A CHEMOTHERAPY-INDUCED NEUROPATHY (HCC): ICD-10-CM

## 2023-01-19 DIAGNOSIS — C78.6 METASTASIS TO PERITONEUM OF UNKNOWN PRIMARY (HCC): Primary | ICD-10-CM

## 2023-01-19 DIAGNOSIS — C78.6 METASTASIS TO PERITONEUM OF UNKNOWN PRIMARY (HCC): ICD-10-CM

## 2023-01-19 DIAGNOSIS — R53.83 FATIGUE DUE TO TREATMENT: ICD-10-CM

## 2023-01-19 DIAGNOSIS — C80.1 METASTASIS TO PERITONEUM OF UNKNOWN PRIMARY (HCC): ICD-10-CM

## 2023-01-19 DIAGNOSIS — G62.0 CHEMOTHERAPY-INDUCED NEUROPATHY (HCC): ICD-10-CM

## 2023-01-19 DIAGNOSIS — Z78.9 ON TOTAL PARENTERAL NUTRITION (TPN): ICD-10-CM

## 2023-01-19 LAB
ALBUMIN SERPL-MCNC: 2.4 G/DL (ref 3.5–5)
ALBUMIN/GLOB SERPL: 0.6 (ref 0.4–1.6)
ALP SERPL-CCNC: 77 U/L (ref 50–136)
ALT SERPL-CCNC: 18 U/L (ref 12–65)
ANION GAP SERPL CALC-SCNC: 5 MMOL/L (ref 2–11)
AST SERPL-CCNC: 16 U/L (ref 15–37)
BASOPHILS # BLD: 0 K/UL (ref 0–0.2)
BASOPHILS NFR BLD: 0 % (ref 0–2)
BILIRUB SERPL-MCNC: 0.2 MG/DL (ref 0.2–1.1)
BUN SERPL-MCNC: 24 MG/DL (ref 6–23)
CALCIUM SERPL-MCNC: 8.9 MG/DL (ref 8.3–10.4)
CHLORIDE SERPL-SCNC: 106 MMOL/L (ref 101–110)
CO2 SERPL-SCNC: 28 MMOL/L (ref 21–32)
CREAT SERPL-MCNC: 0.5 MG/DL (ref 0.6–1)
DIFFERENTIAL METHOD BLD: ABNORMAL
EOSINOPHIL # BLD: 0.2 K/UL (ref 0–0.8)
EOSINOPHIL NFR BLD: 3 % (ref 0.5–7.8)
ERYTHROCYTE [DISTWIDTH] IN BLOOD BY AUTOMATED COUNT: 19.8 % (ref 11.9–14.6)
GLOBULIN SER CALC-MCNC: 4.3 G/DL (ref 2.8–4.5)
GLUCOSE SERPL-MCNC: 107 MG/DL (ref 65–100)
HCT VFR BLD AUTO: 25.4 % (ref 35.8–46.3)
HGB BLD-MCNC: 7.4 G/DL (ref 11.7–15.4)
IMM GRANULOCYTES # BLD AUTO: 0 K/UL (ref 0–0.5)
IMM GRANULOCYTES NFR BLD AUTO: 1 % (ref 0–5)
LYMPHOCYTES # BLD: 1.6 K/UL (ref 0.5–4.6)
LYMPHOCYTES NFR BLD: 30 % (ref 13–44)
MAGNESIUM SERPL-MCNC: 2.2 MG/DL (ref 1.8–2.4)
MCH RBC QN AUTO: 25.9 PG (ref 26.1–32.9)
MCHC RBC AUTO-ENTMCNC: 29.1 G/DL (ref 31.4–35)
MCV RBC AUTO: 88.8 FL (ref 82–102)
MONOCYTES # BLD: 0.3 K/UL (ref 0.1–1.3)
MONOCYTES NFR BLD: 6 % (ref 4–12)
NEUTS SEG # BLD: 3.1 K/UL (ref 1.7–8.2)
NEUTS SEG NFR BLD: 59 % (ref 43–78)
NRBC # BLD: 0.03 K/UL (ref 0–0.2)
PLATELET # BLD AUTO: 471 K/UL (ref 150–450)
PMV BLD AUTO: 10.6 FL (ref 9.4–12.3)
POTASSIUM SERPL-SCNC: 4.6 MMOL/L (ref 3.5–5.1)
PROT SERPL-MCNC: 6.7 G/DL (ref 6.3–8.2)
PROT UR-MCNC: 64 MG/DL
RBC # BLD AUTO: 2.86 M/UL (ref 4.05–5.2)
SODIUM SERPL-SCNC: 139 MMOL/L (ref 133–143)
WBC # BLD AUTO: 5.2 K/UL (ref 4.3–11.1)

## 2023-01-19 PROCEDURE — 83735 ASSAY OF MAGNESIUM: CPT

## 2023-01-19 PROCEDURE — 2580000003 HC RX 258: Performed by: NURSE PRACTITIONER

## 2023-01-19 PROCEDURE — 96375 TX/PRO/DX INJ NEW DRUG ADDON: CPT

## 2023-01-19 PROCEDURE — 6360000002 HC RX W HCPCS: Performed by: NURSE PRACTITIONER

## 2023-01-19 PROCEDURE — 99214 OFFICE O/P EST MOD 30 MIN: CPT | Performed by: NURSE PRACTITIONER

## 2023-01-19 PROCEDURE — 96413 CHEMO IV INFUSION 1 HR: CPT

## 2023-01-19 PROCEDURE — 36591 DRAW BLOOD OFF VENOUS DEVICE: CPT

## 2023-01-19 PROCEDURE — 96417 CHEMO IV INFUS EACH ADDL SEQ: CPT

## 2023-01-19 PROCEDURE — A4216 STERILE WATER/SALINE, 10 ML: HCPCS | Performed by: NURSE PRACTITIONER

## 2023-01-19 PROCEDURE — 84156 ASSAY OF PROTEIN URINE: CPT

## 2023-01-19 PROCEDURE — 80053 COMPREHEN METABOLIC PANEL: CPT

## 2023-01-19 PROCEDURE — 2580000003 HC RX 258: Performed by: INTERNAL MEDICINE

## 2023-01-19 PROCEDURE — 2500000003 HC RX 250 WO HCPCS: Performed by: NURSE PRACTITIONER

## 2023-01-19 PROCEDURE — 85025 COMPLETE CBC W/AUTO DIFF WBC: CPT

## 2023-01-19 RX ORDER — EPINEPHRINE 1 MG/ML
0.3 INJECTION, SOLUTION, CONCENTRATE INTRAVENOUS PRN
Status: CANCELLED | OUTPATIENT
Start: 2023-01-19

## 2023-01-19 RX ORDER — FAMOTIDINE 10 MG/ML
20 INJECTION, SOLUTION INTRAVENOUS
Status: CANCELLED | OUTPATIENT
Start: 2023-01-19

## 2023-01-19 RX ORDER — SODIUM CHLORIDE 9 MG/ML
INJECTION, SOLUTION INTRAVENOUS CONTINUOUS
Status: CANCELLED | OUTPATIENT
Start: 2023-01-19

## 2023-01-19 RX ORDER — ONDANSETRON 2 MG/ML
8 INJECTION INTRAMUSCULAR; INTRAVENOUS ONCE
Status: CANCELLED | OUTPATIENT
Start: 2023-01-19 | End: 2023-01-19

## 2023-01-19 RX ORDER — DIPHENHYDRAMINE HYDROCHLORIDE 50 MG/ML
50 INJECTION INTRAMUSCULAR; INTRAVENOUS ONCE
Status: COMPLETED | OUTPATIENT
Start: 2023-01-19 | End: 2023-01-19

## 2023-01-19 RX ORDER — ALBUTEROL SULFATE 90 UG/1
4 AEROSOL, METERED RESPIRATORY (INHALATION) PRN
Status: CANCELLED | OUTPATIENT
Start: 2023-01-19

## 2023-01-19 RX ORDER — SODIUM CHLORIDE 0.9 % (FLUSH) 0.9 %
5-40 SYRINGE (ML) INJECTION PRN
Status: DISCONTINUED | OUTPATIENT
Start: 2023-01-19 | End: 2023-01-20 | Stop reason: HOSPADM

## 2023-01-19 RX ORDER — SODIUM CHLORIDE 0.9 % (FLUSH) 0.9 %
5-40 SYRINGE (ML) INJECTION PRN
Status: CANCELLED | OUTPATIENT
Start: 2023-01-19

## 2023-01-19 RX ORDER — ONDANSETRON 2 MG/ML
8 INJECTION INTRAMUSCULAR; INTRAVENOUS
Status: CANCELLED | OUTPATIENT
Start: 2023-01-19

## 2023-01-19 RX ORDER — MEPERIDINE HYDROCHLORIDE 50 MG/ML
12.5 INJECTION INTRAMUSCULAR; INTRAVENOUS; SUBCUTANEOUS PRN
Status: CANCELLED | OUTPATIENT
Start: 2023-01-19

## 2023-01-19 RX ORDER — SODIUM CHLORIDE 9 MG/ML
5-250 INJECTION, SOLUTION INTRAVENOUS PRN
Status: DISCONTINUED | OUTPATIENT
Start: 2023-01-19 | End: 2023-01-20 | Stop reason: HOSPADM

## 2023-01-19 RX ORDER — FAMOTIDINE 10 MG/ML
20 INJECTION, SOLUTION INTRAVENOUS ONCE
Status: CANCELLED | OUTPATIENT
Start: 2023-01-19 | End: 2023-01-19

## 2023-01-19 RX ORDER — SODIUM CHLORIDE 0.9 % (FLUSH) 0.9 %
10 SYRINGE (ML) INJECTION PRN
Status: DISCONTINUED | OUTPATIENT
Start: 2023-01-19 | End: 2023-01-20 | Stop reason: HOSPADM

## 2023-01-19 RX ORDER — SODIUM CHLORIDE 9 MG/ML
5-40 INJECTION INTRAVENOUS PRN
Status: CANCELLED | OUTPATIENT
Start: 2023-01-19

## 2023-01-19 RX ORDER — DEXAMETHASONE SODIUM PHOSPHATE 10 MG/ML
10 INJECTION INTRAMUSCULAR; INTRAVENOUS ONCE
Status: COMPLETED | OUTPATIENT
Start: 2023-01-19 | End: 2023-01-19

## 2023-01-19 RX ORDER — SODIUM CHLORIDE 9 MG/ML
5-250 INJECTION, SOLUTION INTRAVENOUS PRN
Status: CANCELLED | OUTPATIENT
Start: 2023-01-19

## 2023-01-19 RX ORDER — DIPHENHYDRAMINE HYDROCHLORIDE 50 MG/ML
50 INJECTION INTRAMUSCULAR; INTRAVENOUS
Status: CANCELLED | OUTPATIENT
Start: 2023-01-19

## 2023-01-19 RX ORDER — HEPARIN SODIUM (PORCINE) LOCK FLUSH IV SOLN 100 UNIT/ML 100 UNIT/ML
500 SOLUTION INTRAVENOUS PRN
Status: CANCELLED | OUTPATIENT
Start: 2023-01-19

## 2023-01-19 RX ORDER — DIPHENHYDRAMINE HYDROCHLORIDE 50 MG/ML
50 INJECTION INTRAMUSCULAR; INTRAVENOUS ONCE
Status: CANCELLED | OUTPATIENT
Start: 2023-01-19 | End: 2023-01-19

## 2023-01-19 RX ORDER — ACETAMINOPHEN 325 MG/1
650 TABLET ORAL
Status: CANCELLED | OUTPATIENT
Start: 2023-01-19

## 2023-01-19 RX ORDER — ONDANSETRON 2 MG/ML
8 INJECTION INTRAMUSCULAR; INTRAVENOUS ONCE
Status: COMPLETED | OUTPATIENT
Start: 2023-01-19 | End: 2023-01-19

## 2023-01-19 RX ADMIN — FAMOTIDINE 20 MG: 10 INJECTION, SOLUTION INTRAVENOUS at 12:01

## 2023-01-19 RX ADMIN — SODIUM CHLORIDE, PRESERVATIVE FREE 10 ML: 5 INJECTION INTRAVENOUS at 13:39

## 2023-01-19 RX ADMIN — SODIUM CHLORIDE 386 MG: 9 INJECTION, SOLUTION INTRAVENOUS at 10:47

## 2023-01-19 RX ADMIN — DEXAMETHASONE SODIUM PHOSPHATE 10 MG: 10 INJECTION INTRAMUSCULAR; INTRAVENOUS at 12:04

## 2023-01-19 RX ADMIN — SODIUM CHLORIDE 25 ML/HR: 9 INJECTION, SOLUTION INTRAVENOUS at 10:17

## 2023-01-19 RX ADMIN — SODIUM CHLORIDE, PRESERVATIVE FREE 10 ML: 5 INJECTION INTRAVENOUS at 08:40

## 2023-01-19 RX ADMIN — DIPHENHYDRAMINE HYDROCHLORIDE 50 MG: 50 INJECTION, SOLUTION INTRAMUSCULAR; INTRAVENOUS at 10:17

## 2023-01-19 RX ADMIN — ONDANSETRON 8 MG: 2 INJECTION INTRAMUSCULAR; INTRAVENOUS at 12:00

## 2023-01-19 RX ADMIN — PACLITAXEL 120 MG: 6 INJECTION, SOLUTION INTRAVENOUS at 12:36

## 2023-01-19 ASSESSMENT — PATIENT HEALTH QUESTIONNAIRE - PHQ9
SUM OF ALL RESPONSES TO PHQ QUESTIONS 1-9: 0
SUM OF ALL RESPONSES TO PHQ QUESTIONS 1-9: 0
SUM OF ALL RESPONSES TO PHQ9 QUESTIONS 1 & 2: 0
SUM OF ALL RESPONSES TO PHQ QUESTIONS 1-9: 0
SUM OF ALL RESPONSES TO PHQ QUESTIONS 1-9: 0
2. FEELING DOWN, DEPRESSED OR HOPELESS: 0
1. LITTLE INTEREST OR PLEASURE IN DOING THINGS: 0

## 2023-01-19 NOTE — PROGRESS NOTES
Patient arrived to port lab for port access and lab draw   [facility] and labs drawn per protocol   *Port remains accessed. Patient discharged from port lab in wheelchair. *

## 2023-01-19 NOTE — PROGRESS NOTES
Arrived to the [facility]. Cyramza and Taxol completed. Patient tolerated without problems. Any issues or concerns during appointment: no.  Patient aware of next infusion appointment on 2/2/23 (date) at 2674 21 35 59 (time). Patient instructed to call provider with temperature of 100.4 or greater or nausea/vomiting/ diarrhea or pain not controlled by medications  Discharged via [facility] with family.

## 2023-01-19 NOTE — PROGRESS NOTES
HEMATOLOGY/ONCOLOGY FOLLOWUP  VISIT      Patient Name: Jeannette Marquez             Date of Visit: 2023  : 1975  Age:48 y.o. Presenting Complaint:  Jeannette Marquez  is seen in follow-up for carcinoma of unknown primary. History of Present Illness:  Ms. Argelia Lazo was seen for the first time in our office in 2022. She was a woman of previously good health who began noticing left-sided back discomfort in early 2022. This was associated  ultimately with a sense of difficulty swallowing and ultimately she presented to [facility] for evaluation. Her symptoms were felt to potentially be indicative of a bowel obstruction and she was sent for a CT scan. This study, performed on 2022  was notable for a linear calcific density along the course of the proximal left ureter with associated moderate hydronephrosis potentially indicative of an intraureteral calculus. There was also stranding throughout the retroperitoneal soft tissues anterior  to the left psoas muscle with pelvic ascites. There was also wall thickening involving the descending colon. The question of colitis or serosal implants was raised. The patient had undergone a gastric sleeve placement several years prior. She had  also undergone a negative colonoscopy about 3 years prior to these presentations. There was a history of anemia ultimately resulting in the performance of a hysterectomy approximately 3 years ago for menorrhagia. Because of the CT scan findings, she  was ultimately referred to Dr. Sivan Blanco for evaluation of a possible pelvic malignancy. On 2022 he performed a laparoscopy and biopsy with evidence of peritoneal carcinomatosis grossly. A \"peritoneal nodule\" and a \"peritoneal implant\"  were both positive for a poorly differentiated metastatic carcinoma potentially consistent with an upper gastrointestinal primary.   An omental biopsy showed no evidence of malignancy. Immunohistochemistries were positive for cytokeratin 7 and negative  for cytokeratin 20. The tumor was weakly positive for CDX2. The only other positive study was MOC-31. Testing through [employer] demonstrated no mutation and ERB-B2. There was no microsatellite instability and no mismatch repair protein deficiencies. There were no targetable lesions. A PET scan was subsequently performed on  showing elevated FDG uptake in the left pelvis corresponding with a masslike density concerning  for peritoneal carcinomatosis. There was a small volume of free fluid within the peritoneal cavity. There was moderate right hydroureteronephrosis. Despite the pathologic findings, there was no evidence of FDG activity in the upper gastrointestinal  tract. CA-125 was slightly elevated at 44. Given the uncertainty of her diagnosis, the patient went back for additional surgery on February 15 at which time she underwent bilateral ureteral stent placement and bilateral salpingo-oophorectomies. Pathology  from that surgery was notable for poorly differentiated carcinoma with occasional signet ring features. Immunohistochemical stains were most consistent with a tumor of the upper gastrointestinal tract. She is  1 para 1 abortus 0. There is no  family history of cancer. She has had no difficulties with vomiting. She still notes that some food traverses her esophagus slowly. She had undergone a prior dilatation without any evidence of cancer. She subsequently began treatment with FOLFOX ultimately  with the addition of bevacizumab in 2022. She ultimately received 8 cycles of FOLFOX most of them with Avastin. On , she was taken to surgery for evaluation of debulking and HIPEC. Unfortunately, at the time of surgery her tumor was found to be tightly adherent and impossible to debulk.   Despite the FOLFOX, she developed progressive ascites necessitating placement of a Pleurx catheter. Based on this symptomatic progression, a decision was made to place her on paclitaxel and ramucirumab. In October, she was hospitalized for recurrent nausea and vomiting. She was placed on intravenous fluids and seen by gastroenterology. She underwent an esophageal dilatation as part of her EGD. Most importantly however a decision was made to place her on TPN. She returns today, on her birthday, for follow-up and C4D15 Cyramza/Taxol. Overall, she continues to tolerate treatment well. She denies any changes since last week. There are no GI or bowel complaints. Weight is stable, eating some by mouth, remains on TPN 12 hours daily. Fatigue is ongoing. There is no cough, shortness of breath, or edema. Neuropathy is stable, no pain. Abd pleurX drainage has decreased, she only got 50-75ml after not drainage for the past 3 weeks which is a great sign of disease response. She denies any fevers or infectious symptoms.               Medications:   Current Outpatient Medications   Medication Sig Dispense Refill    pantoprazole (PROTONIX) 40 MG tablet Take 1 tablet by mouth in the morning and at bedtime 60 tablet 0    cephALEXin (KEFLEX) 500 MG capsule Take 1 capsule by mouth 4 times daily (Patient not taking: No sig reported) 12 capsule 0    folic acid (FOLVITE) 1 MG tablet Take 1 tablet by mouth daily (Patient not taking: No sig reported) 30 tablet 0    lactulose (CHRONULAC) 10 GM/15ML solution Take 30 mLs by mouth 2 times daily as needed (constipation) (Patient not taking: No sig reported) 473 mL 0    metoclopramide (REGLAN) 5 MG tablet Take 1 tablet by mouth 3 times daily (Patient not taking: No sig reported) 90 tablet 0    vitamin D3 (CHOLECALCIFEROL) 10 MCG (400 UNIT) TABS tablet Take 2 tablets by mouth daily (Patient not taking: No sig reported) 60 tablet 0    sennosides-docusate sodium (SENOKOT-S) 8.6-50 MG tablet Take 2 tablets by mouth daily as needed for Constipation (Patient not taking: No sig reported) 90 tablet 0    sucralfate (CARAFATE) 1 GM tablet Take 1 tablet by mouth 4 times daily (Patient not taking: No sig reported) 120 tablet 3    potassium chloride (KLOR-CON M) 20 MEQ extended release tablet Take 1 tablet by mouth 2 times daily (Patient not taking: No sig reported) 60 tablet 1    lidocaine-prilocaine (EMLA) 2.5-2.5 % cream PRN  Apply to port about 45 minutes prior to access (Patient not taking: No sig reported)      ondansetron (ZOFRAN) 8 MG tablet Take 8 mg by mouth every 8 hours as needed (Patient not taking: Reported on 1/19/2023)       No current facility-administered medications for this visit. Facility-Administered Medications Ordered in Other Visits   Medication Dose Route Frequency Provider Last Rate Last Admin    sodium chloride flush 0.9 % injection 10 mL  10 mL IntraVENous PRN Fabrice Choudhary MD   10 mL at 01/19/23 0840       Allergies:  No Known Allergies    Review of Systems: The Review of Systems is documented in full in the internal medical record. All systems are negative other than for those noted above. Past Medical History:  Documented in electronic medical record    Past Surgical History:  Documented in electronic medical record    Social History:  Documented in electronic medical record    Family History:  Documented in electronic medical record      Physical Examination:  General Appearance: Mildly ill appearing patient in no acute distress. Vital signs: /86 (Site: Right Upper Arm, Position: Sitting)   Pulse 87   Temp 99.2 °F (37.3 °C)   Resp 18   Ht 5' 4\" (1.626 m)   Wt 114 lb 3.2 oz (51.8 kg)   SpO2 100%   BMI 19.60 kg/m²     Performance status: ECOG Level:2  Distress  Screening Score: PHQ-9 Total Score: 0 (1/19/2023  9:28 AM)  Pain Score:   0 - No pain    HEENT: No oral exam.  Neck: Supple. There is no thyromegaly. Lymph nodes: Deferred   Breasts: Not examined. Lungs: The lungs are clear to auscultation.  There is no chest wall tenderness and no  use of accessory respiratory musculature.  Heart: There is no jugular venous distention. The rate is normal and rhythm regular. The S1 and S2 are normal and there are no murmurs or rubs.    Abdomen: Soft, non-tender, distended, bowel sounds present and normal  Abdominal Pleurx catheter in place.  Skin: No rash, petechiae or ecchymoses. No evidence of malignancy.   Extremities: No cyanosis, clubbing; trace lower extremity edema.        Labs/Imaging:  Lab Results   Component Value Date/Time    WBC 5.2 01/19/2023 08:38 AM    HGB 7.4 01/19/2023 08:38 AM    HCT 25.4 01/19/2023 08:38 AM     01/19/2023 08:38 AM    MCV 88.8 01/19/2023 08:38 AM       Lab Results   Component Value Date/Time     01/19/2023 08:38 AM    K 4.6 01/19/2023 08:38 AM     01/19/2023 08:38 AM    CO2 28 01/19/2023 08:38 AM    BUN 24 01/19/2023 08:38 AM    GFRAA >60 09/27/2022 02:37 AM    GLOB 4.3 01/19/2023 08:38 AM    ALT 18 01/19/2023 08:38 AM       Above results reviewed with patient.         ASSESSMENT:  Ms. Stovall has an apparent metastatic carcinoma of unknown primary.  Pathologically, the tumor seems to resemble a process arising in the upper gastrointestinal tract.  However, any such primary is  not visible on EGD or PET scanning.  Dr. Crain's presumption is that this may have originated from the colon based on its location and behavior.  That too would be somewhat inconsistent with the pathology findings.  She has been treated with FOLFOX and Avastin.  There was certainly no symptomatic response and in fact she developed progressive and symptomatic ascites suggesting disease progression.  She is now on TPN and receiving paclitaxel and ramucirumab.  Although there have been no obvious radiographic changes, she does seem to be responding.  The drainage of her ascites is much less.  She is actually able to eat some food intake and nutrition bit by mouth.  She continues to receive TPN.  She seems a bit  stronger. Protein calorie malnutrition-now on TPN    PLAN:  Proceed with C4D15 Cyramzqa/Taxol. Continue home TPN through [facility] and can continue to try oral intake as tolerated. Return in 1 week for OV/C4D15.         RESUSCITATION DIRECTIVES/HOSPICE CARE;  Full Support        Dmitry Murray (Kaila Host) [address] [place] [address] Hematology and Oncology  [address] [place] [address]  Office : [phone]  Fax : [phone]

## 2023-01-19 NOTE — PROGRESS NOTES
Dr. Claudia Medina reviewed chart. New order received \"Hgb, Type and Screen DOS. \" [id number] to proceed.

## 2023-01-19 NOTE — PROGRESS NOTES
1/19/23 saw pt today with Grayson Da Silva NP for pre chemo c4d15 taxol/cyramza. She is tolerating chemo well. PO intake is good. Neuropathy is stable. Proceed to infusion. Follow up in 2 weeks. Encouraged to call with any concerns. Navigation will continue to follow.

## 2023-01-19 NOTE — PROGRESS NOTES
Name: Muriel Beach  : 1975  MRN: [id number]  Date of Service: 2023  Type of Service: Behavioral Health  Length of Service: 30 minutes      Subjective: Seen patient with her mother, \"Today is my Beauchamp Gehrig! \"  Objective:  Appearance  Hygiene: clean and  grooming  Dress: clean,  neat, climate appropriate   Speech  General:  clarity  Rate: normal  Latency: none  Volume: normal  Behavior  General: relaxed  Eye Contact: appropriate  Cooperativeness  Cooperative and friendly. Thinking  Thought Processes   logical and goal directed (Birthday)  Thought Content  Future oriented  Mood  Good\"  Affect  Type:  congruent  Range: full range  Appropriateness to content and congruence with stated mood  Insight/Judgment  Adequate    Assessment/Plans:   Will continue to meet with and be available to patient as as needed        NOLBERTO Tirado

## 2023-01-19 NOTE — PATIENT INSTRUCTIONS
Patient Instructions from Today's Visit    Reason for Visit:  Pre chemo cycle 4 day 15 taxol/cyramza    Plan:  Proceed to infusion     Follow Up:  2 weeks    Recent Lab Results:  Hospital Outpatient Visit on 01/19/2023   Component Date Value Ref Range Status    WBC 01/19/2023 5.2  4.3 - 11.1 K/uL Final    RBC 01/19/2023 2.86 (A)  4.05 - 5.2 M/uL Final    Hemoglobin 01/19/2023 7.4 (A)  11.7 - 15.4 g/dL Final    Hematocrit 01/19/2023 25.4 (A)  35.8 - 46.3 % Final    MCV 01/19/2023 88.8  82.0 - 102.0 FL Final    MCH 01/19/2023 25.9 (A)  26.1 - 32.9 PG Final    MCHC 01/19/2023 29.1 (A)  31.4 - 35.0 g/dL Final    RDW 01/19/2023 19.8 (A)  11.9 - 14.6 % Final    Platelets 66/06/3723 471 (A)  150 - 450 K/uL Final    MPV 01/19/2023 10.6  9.4 - 12.3 FL Final    nRBC 01/19/2023 0.03  0.0 - 0.2 K/uL Final    **Note: Absolute NRBC parameter is now reported with Hemogram**    Differential Type 01/19/2023 AUTOMATED    Final    Seg Neutrophils 01/19/2023 59  43 - 78 % Final    Lymphocytes 01/19/2023 30  13 - 44 % Final    Monocytes 01/19/2023 6  4.0 - 12.0 % Final    Eosinophils % 01/19/2023 3  0.5 - 7.8 % Final    Basophils 01/19/2023 0  0.0 - 2.0 % Final    Immature Granulocytes 01/19/2023 1  0.0 - 5.0 % Final    Segs Absolute 01/19/2023 3.1  1.7 - 8.2 K/UL Final    Absolute Lymph # 01/19/2023 1.6  0.5 - 4.6 K/UL Final    Absolute Mono # 01/19/2023 0.3  0.1 - 1.3 K/UL Final    Absolute Eos # 01/19/2023 0.2  0.0 - 0.8 K/UL Final    Basophils Absolute 01/19/2023 0.0  0.0 - 0.2 K/UL Final    Absolute Immature Granulocyte 01/19/2023 0.0  0.0 - 0.5 K/UL Final    Sodium 01/19/2023 139  133 - 143 mmol/L Final    Potassium 01/19/2023 4.6  3.5 - 5.1 mmol/L Final    Chloride 01/19/2023 106  101 - 110 mmol/L Final    CO2 01/19/2023 28  21 - 32 mmol/L Final    Anion Gap 01/19/2023 5  2 - 11 mmol/L Final    Glucose 01/19/2023 107 (A)  65 - 100 mg/dL Final    BUN 01/19/2023 24 (A)  6 - 23 MG/DL Final    Creatinine 01/19/2023 0.50 (A)  0.6 - 1.0 MG/DL Final    EstConrad Filt Rate 01/19/2023 >60  >60 ml/min/1.73m2 Final    Comment:      Pediatric calculator link: Ryan.at. org/professionals/kdoqi/gfr_calculatorped       These results are not intended for use in patients <25years of age. eGFR results are calculated without a race factor using  the 2021 CKD-EPI equation. Careful clinical correlation is recommended, particularly when comparing to results calculated using previous equations. The CKD-EPI equation is less accurate in patients with extremes of muscle mass, extra-renal metabolism of creatinine, excessive creatine ingestion, or following therapy that affects renal tubular secretion. Calcium 01/19/2023 8.9  8.3 - 10.4 MG/DL Final    Total Bilirubin 01/19/2023 0.2  0.2 - 1.1 MG/DL Final    ALT 01/19/2023 18  12 - 65 U/L Final    AST 01/19/2023 16  15 - 37 U/L Final    Alk Phosphatase 01/19/2023 77  50 - 136 U/L Final    Total Protein 01/19/2023 6.7  6.3 - 8.2 g/dL Final    Albumin 01/19/2023 2.4 (A)  3.5 - 5.0 g/dL Final    Globulin 01/19/2023 4.3  2.8 - 4.5 g/dL Final    Albumin/Globulin Ratio 01/19/2023 0.6  0.4 - 1.6   Final    Magnesium 01/19/2023 2.2  1.8 - 2.4 mg/dL Final         Treatment Summary has been discussed and given to patient: no        -------------------------------------------------------------------------------------------------------------------  Please call our office at [phone] if you have any  of the following symptoms:   Fever of 100.5 or greater  Chills  Shortness of breath  Swelling or pain in one leg    After office hours an answering service is available and will contact a provider for emergencies or if you are experiencing any of the above symptoms. Patient did express an interest in My Chart. My Chart log in information explained on the after visit summary printout at the [facility] [place] [address] desk.     Tammy Posey RN, BSN  Nurse Navigator  [phone] cell  [email or web address]

## 2023-01-25 ENCOUNTER — ANESTHESIA EVENT (OUTPATIENT)
Dept: SURGERY | Age: 48
End: 2023-01-25
Payer: COMMERCIAL

## 2023-01-25 RX ORDER — CIPROFLOXACIN 2 MG/ML
400 INJECTION, SOLUTION INTRAVENOUS
Status: CANCELLED | OUTPATIENT
Start: 2023-01-25 | End: 2023-01-25

## 2023-01-26 ENCOUNTER — HOSPITAL ENCOUNTER (OUTPATIENT)
Age: 48
Setting detail: OUTPATIENT SURGERY
Discharge: HOME | End: 2023-01-26
Attending: UROLOGY | Admitting: UROLOGY
Payer: COMMERCIAL

## 2023-01-26 ENCOUNTER — ANESTHESIA (OUTPATIENT)
Dept: SURGERY | Age: 48
End: 2023-01-26
Payer: COMMERCIAL

## 2023-01-26 VITALS
OXYGEN SATURATION: 98 % | BODY MASS INDEX: 19.5 KG/M2 | RESPIRATION RATE: 20 BRPM | WEIGHT: 114.25 LBS | HEIGHT: 64 IN | HEART RATE: 62 BPM | SYSTOLIC BLOOD PRESSURE: 108 MMHG | TEMPERATURE: 98 F | DIASTOLIC BLOOD PRESSURE: 71 MMHG

## 2023-01-26 LAB
ABO + RH BLD: NORMAL
BLOOD GROUP ANTIBODIES SERPL: NORMAL
HGB BLD-MCNC: 8.1 G/DL (ref 11.7–15.4)
SPECIMEN EXP DATE BLD: NORMAL

## 2023-01-26 PROCEDURE — 7100000011 HC PHASE II RECOVERY - ADDTL 15 MIN: Performed by: UROLOGY

## 2023-01-26 PROCEDURE — 85018 HEMOGLOBIN: CPT

## 2023-01-26 PROCEDURE — C1758 CATHETER, URETERAL: HCPCS | Performed by: UROLOGY

## 2023-01-26 PROCEDURE — 86901 BLOOD TYPING SEROLOGIC RH(D): CPT

## 2023-01-26 PROCEDURE — 2709999900 HC NON-CHARGEABLE SUPPLY: Performed by: UROLOGY

## 2023-01-26 PROCEDURE — 2500000003 HC RX 250 WO HCPCS: Performed by: NURSE ANESTHETIST, CERTIFIED REGISTERED

## 2023-01-26 PROCEDURE — 6360000002 HC RX W HCPCS: Performed by: ANESTHESIOLOGY

## 2023-01-26 PROCEDURE — 3600000004 HC SURGERY LEVEL 4 BASE: Performed by: UROLOGY

## 2023-01-26 PROCEDURE — 6370000000 HC RX 637 (ALT 250 FOR IP): Performed by: ANESTHESIOLOGY

## 2023-01-26 PROCEDURE — 6360000002 HC RX W HCPCS: Performed by: UROLOGY

## 2023-01-26 PROCEDURE — 6360000002 HC RX W HCPCS: Performed by: NURSE ANESTHETIST, CERTIFIED REGISTERED

## 2023-01-26 PROCEDURE — 2580000003 HC RX 258: Performed by: NURSE ANESTHETIST, CERTIFIED REGISTERED

## 2023-01-26 PROCEDURE — 7100000000 HC PACU RECOVERY - FIRST 15 MIN: Performed by: UROLOGY

## 2023-01-26 PROCEDURE — 2580000003 HC RX 258: Performed by: ANESTHESIOLOGY

## 2023-01-26 PROCEDURE — 3700000000 HC ANESTHESIA ATTENDED CARE: Performed by: UROLOGY

## 2023-01-26 PROCEDURE — 3600000014 HC SURGERY LEVEL 4 ADDTL 15MIN: Performed by: UROLOGY

## 2023-01-26 PROCEDURE — 7100000001 HC PACU RECOVERY - ADDTL 15 MIN: Performed by: UROLOGY

## 2023-01-26 PROCEDURE — C2617 STENT, NON-COR, TEM W/O DEL: HCPCS | Performed by: UROLOGY

## 2023-01-26 PROCEDURE — 3700000001 HC ADD 15 MINUTES (ANESTHESIA): Performed by: UROLOGY

## 2023-01-26 PROCEDURE — 7100000010 HC PHASE II RECOVERY - FIRST 15 MIN: Performed by: UROLOGY

## 2023-01-26 DEVICE — URETERAL STENT WITH SIDE HOLES 6FX22CM
Type: IMPLANTABLE DEVICE | Site: URETHRA | Status: FUNCTIONAL
Brand: TRIA™ FIRM

## 2023-01-26 RX ORDER — PROCHLORPERAZINE EDISYLATE 5 MG/ML
5 INJECTION INTRAMUSCULAR; INTRAVENOUS
Status: DISCONTINUED | OUTPATIENT
Start: 2023-01-26 | End: 2023-01-26 | Stop reason: HOSPADM

## 2023-01-26 RX ORDER — DIPHENHYDRAMINE HYDROCHLORIDE 50 MG/ML
12.5 INJECTION INTRAMUSCULAR; INTRAVENOUS
Status: DISCONTINUED | OUTPATIENT
Start: 2023-01-26 | End: 2023-01-26 | Stop reason: HOSPADM

## 2023-01-26 RX ORDER — SODIUM CHLORIDE, SODIUM LACTATE, POTASSIUM CHLORIDE, CALCIUM CHLORIDE 600; 310; 30; 20 MG/100ML; MG/100ML; MG/100ML; MG/100ML
INJECTION, SOLUTION INTRAVENOUS CONTINUOUS
Status: DISCONTINUED | OUTPATIENT
Start: 2023-01-26 | End: 2023-01-26 | Stop reason: HOSPADM

## 2023-01-26 RX ORDER — SODIUM CHLORIDE 9 MG/ML
INJECTION, SOLUTION INTRAVENOUS PRN
Status: DISCONTINUED | OUTPATIENT
Start: 2023-01-26 | End: 2023-01-26 | Stop reason: HOSPADM

## 2023-01-26 RX ORDER — ROCURONIUM BROMIDE 10 MG/ML
INJECTION, SOLUTION INTRAVENOUS PRN
Status: DISCONTINUED | OUTPATIENT
Start: 2023-01-26 | End: 2023-01-26 | Stop reason: SDUPTHER

## 2023-01-26 RX ORDER — GLYCOPYRROLATE 0.2 MG/ML
INJECTION INTRAMUSCULAR; INTRAVENOUS PRN
Status: DISCONTINUED | OUTPATIENT
Start: 2023-01-26 | End: 2023-01-26 | Stop reason: SDUPTHER

## 2023-01-26 RX ORDER — MIDAZOLAM HYDROCHLORIDE 2 MG/2ML
2 INJECTION, SOLUTION INTRAMUSCULAR; INTRAVENOUS
Status: COMPLETED | OUTPATIENT
Start: 2023-01-26 | End: 2023-01-26

## 2023-01-26 RX ORDER — APREPITANT 40 MG/1
40 CAPSULE ORAL ONCE
Status: COMPLETED | OUTPATIENT
Start: 2023-01-26 | End: 2023-01-26

## 2023-01-26 RX ORDER — OXYCODONE HYDROCHLORIDE 5 MG/1
5 TABLET ORAL
Status: COMPLETED | OUTPATIENT
Start: 2023-01-26 | End: 2023-01-26

## 2023-01-26 RX ORDER — SODIUM CHLORIDE 0.9 % (FLUSH) 0.9 %
5-40 SYRINGE (ML) INJECTION PRN
Status: DISCONTINUED | OUTPATIENT
Start: 2023-01-26 | End: 2023-01-26 | Stop reason: HOSPADM

## 2023-01-26 RX ORDER — ACETAMINOPHEN 500 MG
1000 TABLET ORAL ONCE
Status: COMPLETED | OUTPATIENT
Start: 2023-01-26 | End: 2023-01-26

## 2023-01-26 RX ORDER — HYDROMORPHONE HCL 110MG/55ML
0.5 PATIENT CONTROLLED ANALGESIA SYRINGE INTRAVENOUS EVERY 10 MIN PRN
Status: DISCONTINUED | OUTPATIENT
Start: 2023-01-26 | End: 2023-01-26 | Stop reason: HOSPADM

## 2023-01-26 RX ORDER — CIPROFLOXACIN 2 MG/ML
400 INJECTION, SOLUTION INTRAVENOUS
Status: COMPLETED | OUTPATIENT
Start: 2023-01-26 | End: 2023-01-26

## 2023-01-26 RX ORDER — ONDANSETRON 2 MG/ML
INJECTION INTRAMUSCULAR; INTRAVENOUS PRN
Status: DISCONTINUED | OUTPATIENT
Start: 2023-01-26 | End: 2023-01-26 | Stop reason: SDUPTHER

## 2023-01-26 RX ORDER — PROPOFOL 10 MG/ML
INJECTION, EMULSION INTRAVENOUS PRN
Status: DISCONTINUED | OUTPATIENT
Start: 2023-01-26 | End: 2023-01-26 | Stop reason: SDUPTHER

## 2023-01-26 RX ORDER — FENTANYL CITRATE 50 UG/ML
100 INJECTION, SOLUTION INTRAMUSCULAR; INTRAVENOUS
Status: DISCONTINUED | OUTPATIENT
Start: 2023-01-26 | End: 2023-01-26 | Stop reason: HOSPADM

## 2023-01-26 RX ORDER — SODIUM CHLORIDE 0.9 % (FLUSH) 0.9 %
5-40 SYRINGE (ML) INJECTION EVERY 12 HOURS SCHEDULED
Status: DISCONTINUED | OUTPATIENT
Start: 2023-01-26 | End: 2023-01-26 | Stop reason: HOSPADM

## 2023-01-26 RX ORDER — EPHEDRINE SULFATE/0.9% NACL/PF 50 MG/5 ML
SYRINGE (ML) INTRAVENOUS PRN
Status: DISCONTINUED | OUTPATIENT
Start: 2023-01-26 | End: 2023-01-26 | Stop reason: SDUPTHER

## 2023-01-26 RX ORDER — DEXTROSE MONOHYDRATE 100 MG/ML
INJECTION, SOLUTION INTRAVENOUS CONTINUOUS PRN
Status: DISCONTINUED | OUTPATIENT
Start: 2023-01-26 | End: 2023-01-26 | Stop reason: HOSPADM

## 2023-01-26 RX ORDER — NEOSTIGMINE METHYLSULFATE 1 MG/ML
INJECTION, SOLUTION INTRAVENOUS PRN
Status: DISCONTINUED | OUTPATIENT
Start: 2023-01-26 | End: 2023-01-26 | Stop reason: SDUPTHER

## 2023-01-26 RX ORDER — LIDOCAINE HYDROCHLORIDE 20 MG/ML
INJECTION, SOLUTION EPIDURAL; INFILTRATION; INTRACAUDAL; PERINEURAL PRN
Status: DISCONTINUED | OUTPATIENT
Start: 2023-01-26 | End: 2023-01-26 | Stop reason: SDUPTHER

## 2023-01-26 RX ORDER — DEXAMETHASONE SODIUM PHOSPHATE 4 MG/ML
INJECTION, SOLUTION INTRA-ARTICULAR; INTRALESIONAL; INTRAMUSCULAR; INTRAVENOUS; SOFT TISSUE PRN
Status: DISCONTINUED | OUTPATIENT
Start: 2023-01-26 | End: 2023-01-26 | Stop reason: SDUPTHER

## 2023-01-26 RX ORDER — LIDOCAINE HYDROCHLORIDE 10 MG/ML
1 INJECTION, SOLUTION INFILTRATION; PERINEURAL
Status: DISCONTINUED | OUTPATIENT
Start: 2023-01-26 | End: 2023-01-26 | Stop reason: HOSPADM

## 2023-01-26 RX ADMIN — ACETAMINOPHEN 1000 MG: 500 TABLET, FILM COATED ORAL at 11:27

## 2023-01-26 RX ADMIN — Medication 5 MG: at 12:14

## 2023-01-26 RX ADMIN — Medication 5 MG: at 12:21

## 2023-01-26 RX ADMIN — CIPROFLOXACIN 400 MG: 2 INJECTION, SOLUTION INTRAVENOUS at 11:57

## 2023-01-26 RX ADMIN — PHENYLEPHRINE HYDROCHLORIDE 200 MCG: 10 INJECTION INTRAVENOUS at 12:04

## 2023-01-26 RX ADMIN — ROCURONIUM BROMIDE 30 MG: 50 INJECTION, SOLUTION INTRAVENOUS at 11:52

## 2023-01-26 RX ADMIN — PROPOFOL 200 MG: 10 INJECTION, EMULSION INTRAVENOUS at 11:52

## 2023-01-26 RX ADMIN — OXYCODONE HYDROCHLORIDE 5 MG: 5 TABLET ORAL at 13:21

## 2023-01-26 RX ADMIN — DEXAMETHASONE SODIUM PHOSPHATE 4 MG: 4 INJECTION, SOLUTION INTRAMUSCULAR; INTRAVENOUS at 12:12

## 2023-01-26 RX ADMIN — MIDAZOLAM 2 MG: 1 INJECTION INTRAMUSCULAR; INTRAVENOUS at 11:36

## 2023-01-26 RX ADMIN — PHENYLEPHRINE HYDROCHLORIDE 200 MCG: 10 INJECTION INTRAVENOUS at 12:14

## 2023-01-26 RX ADMIN — FENTANYL CITRATE 100 MCG: 50 INJECTION INTRAMUSCULAR; INTRAVENOUS at 11:52

## 2023-01-26 RX ADMIN — PHENYLEPHRINE HYDROCHLORIDE 100 MCG: 10 INJECTION INTRAVENOUS at 12:11

## 2023-01-26 RX ADMIN — ONDANSETRON 4 MG: 2 INJECTION INTRAMUSCULAR; INTRAVENOUS at 12:12

## 2023-01-26 RX ADMIN — HYDROMORPHONE HYDROCHLORIDE 0.5 MG: 2 INJECTION, SOLUTION INTRAMUSCULAR; INTRAVENOUS; SUBCUTANEOUS at 13:17

## 2023-01-26 RX ADMIN — APREPITANT 40 MG: 40 CAPSULE ORAL at 11:27

## 2023-01-26 RX ADMIN — PHENYLEPHRINE HYDROCHLORIDE 100 MCG: 10 INJECTION INTRAVENOUS at 12:10

## 2023-01-26 RX ADMIN — GLYCOPYRROLATE 0.6 MG: 0.2 INJECTION INTRAMUSCULAR; INTRAVENOUS at 12:21

## 2023-01-26 RX ADMIN — SUGAMMADEX 200 MG: 100 INJECTION, SOLUTION INTRAVENOUS at 12:28

## 2023-01-26 RX ADMIN — SODIUM CHLORIDE, POTASSIUM CHLORIDE, SODIUM LACTATE AND CALCIUM CHLORIDE: 600; 310; 30; 20 INJECTION, SOLUTION INTRAVENOUS at 11:23

## 2023-01-26 RX ADMIN — HYDROMORPHONE HYDROCHLORIDE 0.5 MG: 2 INJECTION, SOLUTION INTRAMUSCULAR; INTRAVENOUS; SUBCUTANEOUS at 13:27

## 2023-01-26 RX ADMIN — LIDOCAINE HYDROCHLORIDE 100 MG: 20 INJECTION, SOLUTION EPIDURAL; INFILTRATION; INTRACAUDAL; PERINEURAL at 11:52

## 2023-01-26 ASSESSMENT — PAIN DESCRIPTION - DESCRIPTORS
DESCRIPTORS: SORE;ACHING
DESCRIPTORS: ACHING
DESCRIPTORS: ACHING

## 2023-01-26 ASSESSMENT — PAIN DESCRIPTION - LOCATION
LOCATION: VAGINA

## 2023-01-26 ASSESSMENT — PAIN SCALES - GENERAL
PAINLEVEL_OUTOF10: 8
PAINLEVEL_OUTOF10: 6
PAINLEVEL_OUTOF10: 8

## 2023-01-26 ASSESSMENT — PAIN - FUNCTIONAL ASSESSMENT: PAIN_FUNCTIONAL_ASSESSMENT: 0-10

## 2023-01-26 NOTE — PERIOP NOTE
Patient has spoken with WILNER PLUMMER.  Consent has been verified, signed and witnessed by this RN.  2 mg of Versed given SIVP per orders.  Pulse ox monitor in place & tracing appropriately.  Bed in low, locked position with side rails up x 2 and call light in reach.  Instructed pt to call with any needs and to remain in bed.  Verbalizes understanding.  Boyfriend at bedside.

## 2023-01-26 NOTE — ANESTHESIA PROCEDURE NOTES
Airway  Date/Time: 1/26/2023 11:54 AM  Urgency: elective      General Information and Staff    Patient location during procedure: OR  Resident/CRNA: SCOTT Powell - CRNA  Performed: resident/CRNA     Indications and Patient Condition  Indications for airway management: anesthesia  Spontaneous ventilation: present  Sedation level: deep  Preoxygenated: yes  Patient position: sniffing  MILS not maintained throughout  Mask difficulty assessment: vent by bag mask    Final Airway Details  Final airway type: endotracheal airway      Successful airway: ETT  Cuffed: yes   Successful intubation technique: direct laryngoscopy  Facilitating devices/methods: intubating stylet and cricoid pressure  Endotracheal tube insertion site: oral  Blade: Marie  Blade size: #4  ETT size (mm): 7.0  Cormack-Lehane Classification: grade I - full view of glottis  Placement verified by: chest auscultation and capnometry   Measured from: lips  Number of attempts at approach: 1  Ventilation between attempts: bag mask  Number of other approaches attempted: 0    no

## 2023-01-26 NOTE — ANESTHESIA PRE PROCEDURE
Department of Anesthesiology  Preprocedure Note       Name:  Pia Marcial   Age:  50 y.o.  :  1975                                          MRN:  [id number]         Date:  2023      Surgeon: Jonel Callahan): Brittnee Rodriges MD    Procedure: Procedure(s):  CYSTOSCOPY BILATERAL URETERAL STENT EXCHANGE    Medications prior to admission:   Prior to Admission medications    Medication Sig Start Date End Date Taking?  Authorizing Provider   cephALEXin (KEFLEX) 500 MG capsule Take 1 capsule by mouth 4 times daily  Patient not taking: No sig reported 23   Brittnee Rodriges MD   pantoprazole (PROTONIX) 40 MG tablet Take 1 tablet by mouth in the morning and at bedtime 10/26/22   SCOTT Velasquez CNP   folic acid (FOLVITE) 1 MG tablet Take 1 tablet by mouth daily  Patient not taking: No sig reported 10/27/22   SCOTT Velasquez CNP   lactulose (CHRONULAC) 10 GM/15ML solution Take 30 mLs by mouth 2 times daily as needed (constipation)  Patient not taking: No sig reported 10/26/22   SCOTT Velasquez CNP   metoclopramide (REGLAN) 5 MG tablet Take 1 tablet by mouth 3 times daily  Patient not taking: No sig reported 10/26/22   SCOTT Velasquez CNP   vitamin D3 (CHOLECALCIFEROL) 10 MCG (400 UNIT) TABS tablet Take 2 tablets by mouth daily  Patient not taking: No sig reported 10/27/22   SCOTT Velasquez CNP   sennosides-docusate sodium (SENOKOT-S) 8.6-50 MG tablet Take 2 tablets by mouth daily as needed for Constipation  Patient not taking: No sig reported 22   SCOTT Villalba NP   sucralfate (CARAFATE) 1 GM tablet Take 1 tablet by mouth 4 times daily  Patient not taking: No sig reported 22   SCOTT Villalba NP   potassium chloride (KLOR-CON M) 20 MEQ extended release tablet Take 1 tablet by mouth 2 times daily  Patient not taking: No sig reported 22   SCOTT Lemus CNP   lidocaine-prilocaine (EMLA) 2.5-2.5 % cream PRN  Apply to port about 45 minutes prior to access  Patient not taking: No sig reported 3/4/22   Ar Automatic Reconciliation   ondansetron (ZOFRAN) 8 MG tablet Take 8 mg by mouth every 8 hours as needed  Patient not taking: Reported on 1/19/2023 3/4/22   Ar Automatic Reconciliation       Current medications:    No current facility-administered medications for this visit. No current outpatient medications on file.      Facility-Administered Medications Ordered in Other Visits   Medication Dose Route Frequency Provider Last Rate Last Admin    lidocaine 1 % injection 1 mL  1 mL IntraDERmal Once PRN Mitchell Garcia MD        acetaminophen (TYLENOL) tablet 1,000 mg  1,000 mg Oral Once Mitchell Garcia MD        fentaNYL (SUBLIMAZE) injection 100 mcg  100 mcg IntraVENous Once PRN Mitchell Garcia MD        lactated ringers IV soln infusion   IntraVENous Continuous Mitchell Garcia MD        sodium chloride flush 0.9 % injection 5-40 mL  5-40 mL IntraVENous 2 times per day Mitchell Garcia MD        sodium chloride flush 0.9 % injection 5-40 mL  5-40 mL IntraVENous PRN Mitchell Garcia MD        0.9 % sodium chloride infusion   IntraVENous PRN Mitchell Garcia MD        midazolam PF (VERSED) injection 2 mg  2 mg IntraVENous Once PRN Mitchell Garcia MD        aprepitant (EMEND) capsule 40 mg  40 mg Oral Once Mitchell Garcia MD        ciprofloxacin (CIPRO) IVPB 400 mg  400 mg IntraVENous On Call to [id number] Javi Iverson MD           Allergies:  No Known Allergies    Problem List:    Patient Active Problem List   Diagnosis Code    Fibroids D21.9    Paraesophageal hernia K44.9    Menorrhagia with regular cycle N92.0    Papanicolaou smear of cervix with low grade squamous intraepithelial lesion (LGSIL) R87.612    Iron deficiency anemia due to chronic blood loss D50.0    Anemia D64.9    History of weight loss surgery Z98.84    Carcinoma of unknown primary ([facility] [address] C80.1    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 ([facility] [address] C78.6, C80.1    Bilateral hydronephrosis N13.30    Peritoneal carcinoma (HCC) C48.2    Peritoneal carcinomatosis ([address] C78.6    Hematemesis K92.0    Current use of long term anticoagulation Z79.01    Intractable vomiting R11.10    Generalized abdominal pain R10.84    LFT elevation R79.89    Obstruction of both ureters N13.5    Abdominal pain R10.9    Hypokalemia E87.6    Hypomagnesemia E83.42    Hypoalbuminemia E88.09    GERD (gastroesophageal reflux disease) K21.9    Colitis K52.9    Intractable vomiting with nausea R11.2    Fever R50.9    CINV (chemotherapy-induced nausea and vomiting) R11.2, T45.1X5A    Intractable nausea and vomiting R11.2    Severe protein-calorie malnutrition (HCC) E43    Dysphagia R13.10    Metastatic carcinoma (HCC) C79.9    Chronic abdominal pain R10.9, G89.29    Constipation K59.00       Past Medical History:        Diagnosis Date    Alteration in nutrition     per RD note TPN dependent 12 hours per day- [facility]    Anemia     Colon cancer ([address] dx 2022    followed by dr Patricia EL-19 2020    no hospitalization    GERD (gastroesophageal reflux disease)     managed with med    History of blood transfusion     History of colonoscopy 2018    louis Ratliff (see media note), R     Hx of blood clots 2022    per pt \"small clot on lung identified by CT scan\"  CT Scan impression:- Nonobstructive pulmonary filling defect involving Left Lower Lobe     Hypotension     asymptomatic    Obstruction of fallopian tube     per pt has \"1 good tube\"    Peritoneal carcinomatosis ([address]     chemo; abdominal pleurx    Weight loss     80lbs weight loss after gastric sleeve       Past Surgical History:        Procedure Laterality Date     SECTION      CYSTOSCOPY Bilateral 2022    CYSTOSCOPY BILATERAL RETROGRADE PYELOGRAM performed by Muriel Serrano MD at [address] Bilateral 2022    BILATERAL CYSTOSCOPY URETERAL STENT EXCHANGE performed by Muriel Serrano, MD at [address] Bilateral 10/6/2022    CYSTOSCOPY BILATERAL URETERAL STENT REMOVAL, RIGHT URETERAL STENT PLACEMENT performed by Antwan Rose MD at [address] [facility] GASTRIC BYPASS SURGERY  07/23/2014    gastric sleeve- Choudhari    HYSTERECTOMY (CERVIX STATUS UNKNOWN)      2018    HYSTERECTOMY (CERVIX STATUS UNKNOWN)  07/09/2020    [facility] w/ Bilateral salpingectomy and left oophorectomy    IR PERC CATH PLEURAL DRAIN W/IMAG  9/26/2022    IR PERC CATH PLEURAL DRAIN W/IMAG 9/26/2022 [facility]    IR PORT PLACEMENT EQUAL OR GREATER THAN 5 YEARS  2/28/2022    IR PORT PLACEMENT EQUAL OR GREATER THAN 5 YEARS  2/28/2022    IR PORT PLACEMENT EQUAL OR GREATER THAN 5 YEARS 2/28/2022 [facility]    LAPAROTOMY N/A 8/17/2022    EXPLORATORY LAPAROTOMY/ ENTEROENTEROSTOMY performed by Kymberly Alonso MD at [facility] [address] 56  age Talisha Andrew 21s\"    also \"unblocked her FT\"    TONSILLECTOMY      UPPER GASTROINTESTINAL ENDOSCOPY      with dilation    UPPER GASTROINTESTINAL ENDOSCOPY N/A 9/13/2022    EGD ESOPHAGOGASTRODUODENOSCOPY OFL 17 To IR after recovery for Para performed by Josephine Abbasi MD at [address] N/A 10/21/2022    EGD DILATION BALLOON performed by Khushbu Rutledge MD at [address] Bilateral 03/15/2022    cysto       Social History:    Social History     Tobacco Use    Smoking status: Never    Smokeless tobacco: Never   Substance Use Topics    Alcohol use: Not Currently                                Counseling given: Not Answered      Vital Signs (Current): There were no vitals filed for this visit.                                            BP Readings from Last 3 Encounters:   01/19/23 121/86   01/12/23 109/80   01/09/23 115/84       NPO Status:                                                                                 BMI:   Wt Readings from Last 3 Encounters:   01/18/23 110 lb (49.9 kg) 01/19/23 114 lb 3.2 oz (51.8 kg)   01/12/23 115 lb 11.2 oz (52.5 kg)     There is no height or weight on file to calculate BMI.    CBC:   Lab Results   Component Value Date/Time    WBC 5.2 01/19/2023 08:38 AM    RBC 2.86 01/19/2023 08:38 AM    HGB 7.4 01/19/2023 08:38 AM    HCT 25.4 01/19/2023 08:38 AM    MCV 88.8 01/19/2023 08:38 AM    RDW 19.8 01/19/2023 08:38 AM     01/19/2023 08:38 AM       CMP:   Lab Results   Component Value Date/Time     01/19/2023 08:38 AM    K 4.6 01/19/2023 08:38 AM     01/19/2023 08:38 AM    CO2 28 01/19/2023 08:38 AM    BUN 24 01/19/2023 08:38 AM    CREATININE 0.50 01/19/2023 08:38 AM    GFRAA >60 09/27/2022 02:37 AM    AGRATIO 1.1 05/12/2022 12:43 PM    LABGLOM >60 01/19/2023 08:38 AM    GLUCOSE 107 01/19/2023 08:38 AM    PROT 6.7 01/19/2023 08:38 AM    CALCIUM 8.9 01/19/2023 08:38 AM    BILITOT 0.2 01/19/2023 08:38 AM    ALKPHOS 77 01/19/2023 08:38 AM    ALKPHOS 77 05/12/2022 12:43 PM    AST 16 01/19/2023 08:38 AM    ALT 18 01/19/2023 08:38 AM       POC Tests:   No results for input(s): POCGLU, POCNA, POCK, POCCL, POCBUN, POCHEMO, POCHCT in the last 72 hours.     Coags:   Lab Results   Component Value Date/Time    PROTIME 17.5 09/13/2022 05:56 AM    INR 1.4 09/13/2022 05:56 AM    APTT 23.0 08/10/2022 10:39 AM       HCG (If Applicable): No results found for: PREGTESTUR, PREGSERUM, HCG, HCGQUANT     ABGs: No results found for: PHART, PO2ART, MRS0VVW, MQH8OXL, BEART, L5VGDSTP     Type & Screen (If Applicable):  No results found for: LABABO, LABRH    Drug/Infectious Status (If Applicable):  No results found for: HIV, HEPCAB    COVID-19 Screening (If Applicable):   Lab Results   Component Value Date/Time    COVID19 Not Detected 02/11/2022 07:14 AM    COVID19 Performed 02/11/2022 07:14 AM           Anesthesia Evaluation  Patient summary reviewed and Nursing notes reviewed no history of anesthetic complications:   Airway: Mallampati: I  TM distance: >3 FB   Neck ROM: full  Mouth opening: > = 3 FB   Dental:      Comment: Fillings    Pulmonary:normal exam                               Cardiovascular:  Exercise tolerance: good (>4 METS),                    PE comment: Tachycardic   Neuro/Psych:   Negative Neuro/Psych ROS              GI/Hepatic/Renal:   (+) GERD:,          ROS comment: Bilateral hydronephrosis with ureteral stents    Abdominal pleurex for ascites    Denies recent nausea. .   Endo/Other:    (+) blood dyscrasia: anemia:., electrolyte abnormalities (hypokalemia), malignancy/cancer (Colon cancer with peritoneal carcinomatosis and malignant peritoneal ascites ). Abdominal:             Vascular:   + PE (H/o PE. No longer on Eliquis.). Other Findings:             Anesthesia Plan      general     ASA 3       Induction: intravenous. MIPS: Postoperative opioids intended and Prophylactic antiemetics administered. Anesthetic plan and risks discussed with patient.                         Jae Perez MD   1/26/2023

## 2023-01-26 NOTE — BRIEF OP NOTE
Brief Postoperative Note      Patient: Zain Chester  YOB: 1975  MRN: [id number]    Date of Procedure: 1/26/2023    Pre-Op Diagnosis: Metastasis to peritoneum [facility]) [C78.6] bilateral hydronephrosis  Cancer ([address] [C80.1]    Post-Op Diagnosis: right hydronephrosis       Procedure(s):  CYSTOSCOPY / BILATERAL RETROGRADE / RIGHT URETERAL STENT EXCHANGE    Surgeon(s): Torey Chong MD    Assistant:  * No surgical staff found *    Anesthesia: General    Estimated Blood Loss (mL): Minimal    Complications: None    Specimens:   * No specimens in log *    Implants:  Implant Name Type Inv.  Item Serial No.  Lot No. LRB No. Used Action   STENT URET 6 FRX22 CM FIRM [employer]OF[employer]A - [id number]  STENT URET 6 FRX22 CM FIRM MONOFILAMENT TRIA  [employer]- [id number] Right 1 Implanted         Drains:   [REMOVED] Open Drain 09/26/22 RUQ (Removed)       [REMOVED] Open Drain 09/27/22 Right Abdomen;RUQ (Removed)       [REMOVED] Urinary Catheter 10/06/22 (Removed)       [REMOVED] External Urinary Catheter (Removed)       Findings: left retrograde pyelogram with no evidence of hydronephrosis; right RPG with mod hydronephrosis; right 6x22 tria JJ exchange    Electronically signed by Torey Chong MD on 1/26/2023 at 12:23 PM

## 2023-01-26 NOTE — ANESTHESIA POSTPROCEDURE EVALUATION
Department of Anesthesiology  Postprocedure Note    Patient: Blake Sharma  MRN: [id number]  YOB: 1975  Date of evaluation: 1/26/2023      Procedure Summary     Date: 01/26/23 Room / Location: [facility] OR 01 CYSTO / [facility] OR    Anesthesia Start: 1144 Anesthesia Stop: 0286    Procedure: Celso Polish / BILATERAL RETROGRADE / RIGHT URETERAL STENT EXCHANGE (Bilateral: Urethra) Diagnosis:       Metastasis to peritoneum ([address]      Cancer ([address]      (Metastasis to peritoneum ([address] [C78.6])      (Cancer ([address] [C80.1])    Providers: Melanie Mitchell MD Responsible Provider: Eulalio Paulino MD    Anesthesia Type: General ASA Status: 3          Anesthesia Type: General    Dianne Phase I: Dianne Score: 8    Dianne Phase II: Dianne Score: 10      Anesthesia Post Evaluation    Patient location during evaluation: PACU  Patient participation: complete - patient participated  Level of consciousness: awake and alert  Airway patency: patent  Nausea & Vomiting: no nausea and no vomiting  Complications: no  Cardiovascular status: hemodynamically stable  Respiratory status: acceptable, nonlabored ventilation and spontaneous ventilation  Hydration status: euvolemic  Comments: /71   Pulse 62   Temp 98 °F (36.7 °C) (Infrared)   Resp 20   Ht 5' 4\" (1.626 m)   Wt 114 lb 4 oz (51.8 kg)   SpO2 98%   BMI 19.61 kg/m²     Multimodal analgesia pain management approach

## 2023-01-26 NOTE — DISCHARGE INSTRUCTIONS
My office will schedule your follow-up appointment. Please call our office ([phone]) or return to ED if you experience fever > 101.5, severe pain, persistent nausea/vomiting, excessive bleeding, inability to urinate, or other concerns. Please complete your course of keflex antibiotics. Ureteral Stent Placement: What to Expect at [address] [place] [address]  A ureteral (say \"you-REE-ter-ul\") stent is a thin, hollow tube that is placed in the ureter to help urine pass from the kidney into the bladder. Ureters are the tubes that connect the kidneys to the bladder. You may have a small amount of blood in your urine for 1 to 3 days after the procedure. While the stent is in place, you may have to urinate more often, feel a sudden need to urinate, or feel like you cannot completely empty your bladder. You may feel some pain when you urinate or do strenuous activity. You also may notice a small amount of blood in your urine after strenuous activities. These side effects usually do not prevent people from doing their normal daily activities. You may have a string attached to your stent. Do not to pull the string unless the doctor tells you to. The doctor will use the string to pull out the stent when you no longer need it. After the procedure, urine may flow better from your kidneys to your bladder. A ureteral stent may be left in place for several days or for as long as several months. Your doctor will take it out when you no longer need it. Cystoscopy: What to Expect at [address] [place] [address]  A cystoscopy is a procedure that lets a doctor look inside of the bladder and the urethra. The urethra is the tube that carries urine from the bladder to outside the body. The doctor uses a thin, lighted tube called a cystoscope to look for kidney or bladder stones, tumors, bleeding, or infection.  After the cystoscopy, your urethra may be sore at first, and it may burn when you urinate for the first few days after the procedure. You may feel the need to urinate more often, and your urine may be pink. These symptoms should get better in 1 or 2 days. You will probably be able to go back to work or most of your usual activities in 1 or 2 days. How can you care for yourself at home? Activity  Rest when you feel tired. Getting enough sleep will help you recover. Try to walk each day. Start by walking a little more than you did the day before. Bit by bit, increase the amount you walk. Walking boosts blood flow and helps prevent pneumonia and constipation. Avoid strenuous activities, such as bicycle riding, jogging, weight lifting, or aerobic exercise, until your doctor says it is okay. Ask your doctor when you can drive again. Most people are able to return to work within 1 or 2 days after the procedure. You may shower and take baths as usual.  Ask your doctor when it is okay for you to have sex. Diet  You can eat your normal diet. If your stomach is upset, try bland, low-fat foods like plain rice, broiled chicken, toast, and yogurt. Drink plenty of fluids (unless your doctor tells you not to). Medicines  Take pain medicines exactly as directed. If the doctor gave you a prescription medicine for pain, take it as prescribed. If you are not taking a prescription pain medicine, ask your doctor if you can take an over-the-counter medicine. If you think your pain medicine is making you sick to your stomach: Take your medicine after meals (unless your doctor has told you not to). Ask your doctor for a different pain medicine. If your doctor prescribed antibiotics, take them as directed. Do not stop taking them just because you feel better. You need to take the full course of antibiotics. Medication Interaction:  During your procedure you potentially received a medication or medications which may reduce the effectiveness of oral contraceptives.  Please consider other forms of contraception for 1 month following your procedure if you are currently using oral contraceptives as your primary form of birth control. In addition to this, we recommend continuing your oral contraceptive as prescribed, unless otherwise instructed by your physician, during this time. Follow-up care is a key part of your treatment and safety. Be sure to make and go to all appointments, and call your doctor if you are having problems. It's also a good idea to know your test results and keep a list of the medicines you take. When should you call for help? Call 911 anytime you think you may need emergency care. For example, call if:  You passed out (lost consciousness). You have severe trouble breathing. You have sudden chest pain and shortness of breath, or you cough up blood. You have severe belly pain. Call your doctor now or seek immediate medical care if:  You are sick to your stomach or cannot keep fluids down. Your urine is still red or you see blood clots after you have urinated several times. You have trouble passing urine or stool, especially if you have pain or swelling in your lower belly. You have signs of a blood clot, such as:  Pain in your calf, back of the knee, thigh, or groin. Redness and swelling in your leg or groin. You develop a fever or severe chills. You have pain in your back just below your rib cage. This is called flank pain. Watch closely for changes in your health, and be sure to contact your doctor if:  A burning feeling is normal for a day or two after the test, but call if it does not get better. You have a frequent urge to urinate but can pass only small amounts of urine. It is normal for the urine to have a pinkish color for a few days after the test, but call if it does not get better or if your urine is cloudy, smells bad, or has pus in it.     After general anesthesia or intravenous sedation, for 24 hours or while taking prescription Narcotics:  Limit your activities  A responsible adult needs to be with you for the next 24 hours  Do not drive and operate hazardous machinery  Do not make important personal or business decisions  Do not drink alcoholic beverages  If you have not urinated within 8 hours after discharge, and you are experiencing discomfort from urinary retention, please go to the nearest ED. If you have sleep apnea and have a CPAP machine, please use it for all naps and sleeping. Please use caution when taking narcotics and any of your home medications that may cause drowsiness. *  Please give a list of your current medications to your Primary Care Provider. *  Please update this list whenever your medications are discontinued, doses are      changed, or new medications (including over-the-counter products) are added. *  Please carry medication information at all times in case of emergency situations. These are general instructions for a healthy lifestyle:  No smoking/ No tobacco products/ Avoid exposure to second hand smoke  Surgeon General's Warning:  Quitting smoking now greatly reduces serious risk to your health. Obesity, smoking, and sedentary lifestyle greatly increases your risk for illness  A healthy diet, regular physical exercise & weight monitoring are important for maintaining a healthy lifestyle    You may be retaining fluid if you have a history of heart failure or if you experience any of the following symptoms:  Weight gain of 3 pounds or more overnight or 5 pounds in a week, increased swelling in our hands or feet or shortness of breath while lying flat in bed. Please call your doctor as soon as you notice any of these symptoms; do not wait until your next office visit.

## 2023-01-27 NOTE — OP NOTE
[facility]  OPERATIVE REPORT    Name:  Carlos Winters  MR#:  [id number]  :  1975  ACCOUNT #:  [de-identified]  DATE OF SERVICE:  2023    PREOPERATIVE DIAGNOSES:  Metastatic carcinoma of unknown origin and bilateral hydronephrosis. POSTOPERATIVE DIAGNOSES:  Metastatic carcinoma of unknown origin and right-sided hydronephrosis. PROCEDURES PERFORMED:  Cystoscopy with left retrograde pyelogram, right retrograde pyelogram, exchange of right ureteral stent. SURGEON:  Itzel Shields MD    ASSISTANT:  None. ANESTHESIA:  General with endotracheal.    COMPLICATIONS:  None. SPECIMENS REMOVED:  None. IMPLANTS:  A right 6-Ivorian x 22 cm Tria stent. ESTIMATED BLOOD LOSS:  ***. INDICATION FOR PROCEDURE:  The patient is a very pleasant 55-year-old female who is well known to me. She had a recent CT scan that showed some residual possibly worsening hydronephrosis on the right side where she had a stent in place and then there was some potential dilation of her left collecting system as well. She presents today for the above-mentioned procedure. Of note, her urine culture showed some beta-hemolytic Strep. She was started on Keflex yesterday and has two remaining days she will complete. DETAILS OF THE PROCEDURE:  After informed consent was obtained, she was taken to the operating room #1 at the [address] [place] [address] after induction of general anesthesia and placement of an endotracheal tube. She was carefully positioned in low lithotomy position. Her genitalia were prepped and draped in the usual sterile fashion. We performed a time-out verifying the procedure. She was given ciprofloxacin as an additional prophylactic antibiotic. I then inserted 22-Ivorian cystoscope in her bladder and completely inspected her bladder. It appeared normal except for some bullous edema changes around where the right ureteral stent was coming from the right UO.     I then inserted a 5-Kenyan open-ended ureteral catheter and carefully cannulated the left UO. I obtained a retrograde pyelogram on that side. FINDINGS OF LEFT RETROGRADE PYELOGRAM:  The ureter was narrow with no dilation or filling defects. The upper portion of the collecting system was equally dilated without any evidence of hydronephrosis or filling defects. I then removed the open-ended ureteral catheter and inserted stent graspers. I grasped the end of the stent coming from the right UO and pulled it back to the meatus. Next, I threaded a sensor guidewire through the stent under fluoroscopic guidance. I then over the wire placed the open-ended catheter and obtained a retrograde pyelogram also on the right side. FINDINGS OF RIGHT RETROGRADE PYELOGRAM:  There was mild-to-moderate dilation of the ureter starting at the mid ureter and moving up towards the renal pelvis. The renal pelvis and calyces were moderately dilated. There were no filling defects. I then replaced the sensor guidewire through the open-ended catheter and removed it. I then backloaded the wire onto the cystoscope and placed a 6-Kenyan x 22 cm double-J Tria stent under cystoscopic and fluoroscopic guidance. There was a good curl in the upper portion of the collecting system as well as the bladder. The bladder was then completely emptied and the scope removed. The patient was awakened, extubated, and taken to the recovery room in stable condition. There were no known complications. The patient will be discharged home with instructions to followup up with me in approximately five months.   We will determine how she has responded to her treatments, review her imaging, ensure her renal function is stable, and then make a decision on whether or not to exchange the stent in approximately six months or should we remove it and give her a trial.      MD FARIHA [id number]  D:  01/26/2023 12:37  T: 01/27/2023 0:49  JOB #:  [id number]

## 2023-01-27 NOTE — PROGRESS NOTES
Spiritual Care Visit. Initial visit. Patient was visited by [facility]. Entered by Shonda Garcia, Staff .  [place], Ronald

## 2023-01-31 RX ORDER — ERGOCALCIFEROL 1.25 MG/1
50000 CAPSULE ORAL
Qty: 8 CAPSULE | Refills: 0 | Status: SHIPPED | OUTPATIENT
Start: 2023-01-31 | End: 2023-04-01

## 2023-02-01 NOTE — PROGRESS NOTES
[address] [place] [address] Hematology and Oncology: Office Visit Established Patient    Reason for follow up:    Romero Schaeffer  is seen in follow-up for carcinoma of unknown primary. Overview: (copied from prior)  Ms. Cornel Schultz was seen for the first time in our office in February 2022. She was a woman of previously good health who began noticing left-sided back discomfort in early January 2022. This was associated  ultimately with a sense of difficulty swallowing and ultimately she presented to [facility] for evaluation. Her symptoms were felt to potentially be indicative of a bowel obstruction and she was sent for a CT scan. This study, performed on January 27, 2022  was notable for a linear calcific density along the course of the proximal left ureter with associated moderate hydronephrosis potentially indicative of an intraureteral calculus. There was also stranding throughout the retroperitoneal soft tissues anterior  to the left psoas muscle with pelvic ascites. There was also wall thickening involving the descending colon. The question of colitis or serosal implants was raised. The patient had undergone a gastric sleeve placement several years prior. She had  also undergone a negative colonoscopy about 3 years prior to these presentations. There was a history of anemia ultimately resulting in the performance of a hysterectomy approximately 3 years ago for menorrhagia. Because of the CT scan findings, she  was ultimately referred to Dr. Abena Sam for evaluation of a possible pelvic malignancy. On February 1, 2022 he performed a laparoscopy and biopsy with evidence of peritoneal carcinomatosis grossly. A \"peritoneal nodule\" and a \"peritoneal implant\"  were both positive for a poorly differentiated metastatic carcinoma potentially consistent with an upper gastrointestinal primary. An omental biopsy showed no evidence of malignancy.   Immunohistochemistries were positive for cytokeratin 7 and negative  for cytokeratin 20. The tumor was weakly positive for CDX2. The only other positive study was MOC-31. Testing through [facility] demonstrated no mutation and ERB-B2. There was no microsatellite instability and no mismatch repair protein deficiencies. There were no targetable lesions. A PET scan was subsequently performed on  showing elevated FDG uptake in the left pelvis corresponding with a masslike density concerning  for peritoneal carcinomatosis. There was a small volume of free fluid within the peritoneal cavity. There was moderate right hydroureteronephrosis. Despite the pathologic findings, there was no evidence of FDG activity in the upper gastrointestinal  tract. CA-125 was slightly elevated at 44. Given the uncertainty of her diagnosis, the patient went back for additional surgery on February 15 at which time she underwent bilateral ureteral stent placement and bilateral salpingo-oophorectomies. Pathology  from that surgery was notable for poorly differentiated carcinoma with occasional signet ring features. Immunohistochemical stains were most consistent with a tumor of the upper gastrointestinal tract. She is  1 para 1 abortus 0. There is no  family history of cancer. She has had no difficulties with vomiting. She still notes that some food traverses her esophagus slowly. She had undergone a prior dilatation without any evidence of cancer. She subsequently began treatment with FOLFOX ultimately  with the addition of bevacizumab in 2022. She ultimately received 8 cycles of FOLFOX most of them with Avastin. On , she was taken to surgery for evaluation of debulking and HIPEC. Unfortunately, at the time of surgery her tumor was found to be tightly adherent and impossible to debulk. Despite the FOLFOX, she developed progressive ascites necessitating placement of a Pleurx catheter.   Based on this symptomatic progression, a decision was made to place her on paclitaxel and ramucirumab. In October, she was hospitalized for recurrent nausea and vomiting. She was placed on intravenous fluids and seen by gastroenterology. She underwent an esophageal dilatation as part of her EGD. Most importantly however a decision was made to place her on TPN.      1/19/23: She returns today, on her birthday, for follow-up and C4D15 Cyramza/Taxol. Overall, she continues to tolerate treatment well. She denies any changes since last week. There are no GI or bowel complaints. Weight is stable, eating some by mouth, remains on TPN 12 hours daily. Fatigue is ongoing. There is no cough, shortness of breath, or edema. Neuropathy is stable, no pain. Abd pleurX drainage has decreased, she only got 50-75ml after not drainage for the past 3 weeks which is a great sign of disease response. She denies any fevers or infectious symptoms. 1/26/2023: PROCEDURES PERFORMED:  Cystoscopy with left retrograde pyelogram, right retrograde pyelogram, exchange of right ureteral stent. Interval history:  Patient returns for evaluation prior to starting cycle 5 of planned treatment with Cyramza + Taxol. She underwent exchange of right ureteral stent on 1/26/2023. She has remained mostly dependent upon TPN for nutritional intake. She reports having some heartburn and is planning to see her gastroenterologist as she feels that if GERD could be controlled, she could try to eat more. She denies fever, chills, chest pain, cough, nausea, vomiting, abdominal pain. She gets a bowel movement every 3 to 4 days. Denies diarrhea. No mouth sores or HFS. Peritoneal drain produced about 100 cc on 1/23/2023. She does not feel unusually bloated and has no pain today. Review of Systems:  14 point ROS was negative except as per HPI      ECOG PERFORMANCE STATUS - 2 - Ambulatory and capable of all selfcare but unable to carry out any work activities.  Up and about more than 50% of waking hours. Pain - /10. None/Minimal pain - not affecting QOL     Fatigue - No flowsheet data found. Distress - No flowsheet data found. Reviewed and updated this visit by provider:  Tobacco  Allergies  Meds  Problems  Med Hx  Surg Hx  Fam Hx          Current Outpatient Medications   Medication Sig Dispense Refill    vitamin D (ERGOCALCIFEROL) 1.25 MG (42753 UT) CAPS capsule Take 1 capsule by mouth every 7 days 8 capsule 0    cephALEXin (KEFLEX) 500 MG capsule Take 1 capsule by mouth 4 times daily 12 capsule 0    pantoprazole (PROTONIX) 40 MG tablet Take 1 tablet by mouth in the morning and at bedtime 60 tablet 0    metoclopramide (REGLAN) 5 MG tablet Take 1 tablet by mouth 3 times daily 90 tablet 0    lidocaine-prilocaine (EMLA) 2.5-2.5 % cream PRN  Apply to port about 45 minutes prior to access      ondansetron (ZOFRAN) 8 MG tablet Take 8 mg by mouth every 8 hours as needed       No current facility-administered medications for this visit. Facility-Administered Medications Ordered in Other Visits   Medication Dose Route Frequency Provider Last Rate Last Admin    sodium chloride flush 0.9 % injection 10 mL  10 mL IntraVENous PRN Netta Lujan MD   10 mL at 02/02/23 1128        OBJECTIVE:  /81   Pulse 78   Temp 98.1 °F (36.7 °C)   Resp 16   Wt 114 lb (51.7 kg)   SpO2 100%   BMI 19.57 kg/m²   Wt Readings from Last 3 Encounters:   02/02/23 114 lb (51.7 kg)   01/26/23 114 lb 4 oz (51.8 kg)   01/19/23 114 lb 3.2 oz (51.8 kg)       Physical Exam:  Patient is alert and oriented x3. She is in no acute distress. Neck: Supple. There is no thyromegaly  Lungs: The lungs are clear to auscultation. There is no chest wall tenderness and no  use of accessory respiratory musculature. Heart: There is no jugular venous distention. The rate is normal and rhythm regular. The S1 and S2 are normal and there are no murmurs or rubs.     Abdomen: Soft, non-tender, distended, bowel sounds present and normal. peritoneal drainage catheter in place. Skin: No rash, petechiae or ecchymoses. No evidence of malignancy. Extremities: No cyanosis, clubbing; trace lower extremity edema. Labs:  Lab Results   Component Value Date    WBC 6.5 02/02/2023    HGB 8.2 (L) 02/02/2023    HCT 28.7 (L) 02/02/2023    MCV 88.9 02/02/2023     02/02/2023     Lab Results   Component Value Date    NEUTROABS 4.2 05/12/2022    LYMPHOPCT 24 02/02/2023    LYMPHSABS 1.6 02/02/2023    MONOPCT 14 (H) 02/02/2023    MONOSABS 0.9 02/02/2023    EOSABS 0.1 02/02/2023    BASOPCT 0 02/02/2023     Lab Results   Component Value Date     01/19/2023    K 4.6 01/19/2023     01/19/2023    CO2 28 01/19/2023    BUN 24 (H) 01/19/2023    CREATININE 0.50 (L) 01/19/2023    GLUCOSE 107 (H) 01/19/2023    CALCIUM 8.9 01/19/2023    PROT 6.7 01/19/2023    LABALBU 2.4 (L) 01/19/2023    BILITOT 0.2 01/19/2023    ALKPHOS 77 01/19/2023    AST 16 01/19/2023    ALT 18 01/19/2023    LABGLOM >60 01/19/2023    GFRAA >60 09/27/2022    AGRATIO 1.1 (L) 05/12/2022    GLOB 4.3 01/19/2023               Imaging:  No results found. ASSESSMENT:  Ms. Adilia Workman has an apparent metastatic carcinoma of unknown primary. Pathologically, the tumor seems to resemble a process arising in the upper gastrointestinal tract. However, any such primary is  not visible on EGD or PET scanning. Dr. Young Jon presumption is that this may have originated from the colon based on its location and behavior. That too would be somewhat inconsistent with the pathology findings. She has been treated with FOLFOX and Avastin. There was certainly no symptomatic response and in fact she developed progressive and symptomatic ascites suggesting disease progression. She is now on TPN and receiving paclitaxel and ramucirumab. Although there have been no obvious radiographic changes, she does seem to be responding. The drainage of her ascites is much less.   She is actually able to eat some food intake and nutrition bit by mouth. She continues to receive TPN. She seems a bit stronger. Protein calorie malnutrition-now on TPN     PLAN:  Proceed with C4D15 Cyramzqa/Taxol. Continue home TPN through [facility] and can continue to try oral intake as tolerated. Return in 1 week for OV/C4D15.      2/2/2023: Patient has remained clinically stable. She plans to see gastroenterology for evaluation of heartburn. Labs and physical exam are adequate to proceed with day 1 of cycle 5 of ramucirumab + paclitaxel. We will plan to repeat imaging next month. She will continue receiving total parenteral nutrition. She will continue to be seen intermittently by clinical nutritionist.  Return office visit will be scheduled as per treatment plan. All questions were answered to the best of my abilities. Pertinent old records including NGS testing were reviewed. No targetable mutations were found. Fatmata Owens MD  [facility] Hematology and Oncology  [address] [place] [address]  Office : [phone]  Fax : [phone]    Elements of this note have been dictated using speech recognition software. As a result, errors of speech recognition may have occurred.

## 2023-02-02 ENCOUNTER — HOSPITAL ENCOUNTER (OUTPATIENT)
Dept: INFUSION THERAPY | Age: 48
Discharge: HOME | End: 2023-02-02
Payer: COMMERCIAL

## 2023-02-02 ENCOUNTER — CLINICAL DOCUMENTATION (OUTPATIENT)
Dept: CASE MANAGEMENT | Age: 48
End: 2023-02-02

## 2023-02-02 ENCOUNTER — OFFICE VISIT (OUTPATIENT)
Dept: ONCOLOGY | Age: 48
End: 2023-02-02
Payer: COMMERCIAL

## 2023-02-02 ENCOUNTER — OFFICE VISIT (OUTPATIENT)
Dept: ONCOLOGY | Age: 48
End: 2023-02-02

## 2023-02-02 VITALS
HEART RATE: 78 BPM | DIASTOLIC BLOOD PRESSURE: 81 MMHG | WEIGHT: 114 LBS | BODY MASS INDEX: 19.57 KG/M2 | RESPIRATION RATE: 16 BRPM | SYSTOLIC BLOOD PRESSURE: 129 MMHG | OXYGEN SATURATION: 100 % | TEMPERATURE: 98.1 F

## 2023-02-02 DIAGNOSIS — Z78.9 ON TOTAL PARENTERAL NUTRITION (TPN): ICD-10-CM

## 2023-02-02 DIAGNOSIS — C80.1 METASTASIS TO PERITONEUM OF UNKNOWN PRIMARY (HCC): ICD-10-CM

## 2023-02-02 DIAGNOSIS — C78.6 METASTASIS TO PERITONEUM OF UNKNOWN PRIMARY (HCC): Primary | ICD-10-CM

## 2023-02-02 DIAGNOSIS — Z00.8 NUTRITIONAL ASSESSMENT: Primary | ICD-10-CM

## 2023-02-02 DIAGNOSIS — C80.1 CARCINOMA OF UNKNOWN PRIMARY (HCC): ICD-10-CM

## 2023-02-02 DIAGNOSIS — C78.6 METASTASIS TO PERITONEUM OF UNKNOWN PRIMARY (HCC): ICD-10-CM

## 2023-02-02 DIAGNOSIS — C80.1 CARCINOMA OF UNKNOWN PRIMARY (HCC): Primary | ICD-10-CM

## 2023-02-02 DIAGNOSIS — C80.1 METASTASIS TO PERITONEUM OF UNKNOWN PRIMARY (HCC): Primary | ICD-10-CM

## 2023-02-02 LAB
ALBUMIN SERPL-MCNC: 2.5 G/DL (ref 3.5–5)
ALBUMIN/GLOB SERPL: 0.6 (ref 0.4–1.6)
ALP SERPL-CCNC: 90 U/L (ref 50–136)
ALT SERPL-CCNC: 22 U/L (ref 12–65)
ANION GAP SERPL CALC-SCNC: 5 MMOL/L (ref 2–11)
AST SERPL-CCNC: 15 U/L (ref 15–37)
BASOPHILS # BLD: 0 K/UL (ref 0–0.2)
BASOPHILS NFR BLD: 0 % (ref 0–2)
BILIRUB SERPL-MCNC: 0.2 MG/DL (ref 0.2–1.1)
BUN SERPL-MCNC: 26 MG/DL (ref 6–23)
CALCIUM SERPL-MCNC: 9 MG/DL (ref 8.3–10.4)
CHLORIDE SERPL-SCNC: 104 MMOL/L (ref 101–110)
CO2 SERPL-SCNC: 28 MMOL/L (ref 21–32)
CREAT SERPL-MCNC: 0.4 MG/DL (ref 0.6–1)
DIFFERENTIAL METHOD BLD: ABNORMAL
EOSINOPHIL # BLD: 0.1 K/UL (ref 0–0.8)
EOSINOPHIL NFR BLD: 1 % (ref 0.5–7.8)
ERYTHROCYTE [DISTWIDTH] IN BLOOD BY AUTOMATED COUNT: 21.4 % (ref 11.9–14.6)
GLOBULIN SER CALC-MCNC: 4.3 G/DL (ref 2.8–4.5)
GLUCOSE SERPL-MCNC: 114 MG/DL (ref 65–100)
HCT VFR BLD AUTO: 28.7 % (ref 35.8–46.3)
HGB BLD-MCNC: 8.2 G/DL (ref 11.7–15.4)
IMM GRANULOCYTES # BLD AUTO: 0 K/UL (ref 0–0.5)
IMM GRANULOCYTES NFR BLD AUTO: 0 % (ref 0–5)
LYMPHOCYTES # BLD: 1.6 K/UL (ref 0.5–4.6)
LYMPHOCYTES NFR BLD: 24 % (ref 13–44)
MAGNESIUM SERPL-MCNC: 2 MG/DL (ref 1.8–2.4)
MCH RBC QN AUTO: 25.4 PG (ref 26.1–32.9)
MCHC RBC AUTO-ENTMCNC: 28.6 G/DL (ref 31.4–35)
MCV RBC AUTO: 88.9 FL (ref 82–102)
MONOCYTES # BLD: 0.9 K/UL (ref 0.1–1.3)
MONOCYTES NFR BLD: 14 % (ref 4–12)
NEUTS SEG # BLD: 4 K/UL (ref 1.7–8.2)
NEUTS SEG NFR BLD: 61 % (ref 43–78)
NRBC # BLD: 0 K/UL (ref 0–0.2)
PLATELET # BLD AUTO: 449 K/UL (ref 150–450)
PMV BLD AUTO: 10.2 FL (ref 9.4–12.3)
POTASSIUM SERPL-SCNC: 4.5 MMOL/L (ref 3.5–5.1)
PROT SERPL-MCNC: 6.8 G/DL (ref 6.3–8.2)
PROT UR-MCNC: 46 MG/DL
RBC # BLD AUTO: 3.23 M/UL (ref 4.05–5.2)
SODIUM SERPL-SCNC: 137 MMOL/L (ref 133–143)
WBC # BLD AUTO: 6.5 K/UL (ref 4.3–11.1)

## 2023-02-02 PROCEDURE — 80053 COMPREHEN METABOLIC PANEL: CPT

## 2023-02-02 PROCEDURE — 84156 ASSAY OF PROTEIN URINE: CPT

## 2023-02-02 PROCEDURE — 96375 TX/PRO/DX INJ NEW DRUG ADDON: CPT

## 2023-02-02 PROCEDURE — 99214 OFFICE O/P EST MOD 30 MIN: CPT | Performed by: INTERNAL MEDICINE

## 2023-02-02 PROCEDURE — 96417 CHEMO IV INFUS EACH ADDL SEQ: CPT

## 2023-02-02 PROCEDURE — 83735 ASSAY OF MAGNESIUM: CPT

## 2023-02-02 PROCEDURE — A4216 STERILE WATER/SALINE, 10 ML: HCPCS | Performed by: INTERNAL MEDICINE

## 2023-02-02 PROCEDURE — 6360000002 HC RX W HCPCS: Performed by: INTERNAL MEDICINE

## 2023-02-02 PROCEDURE — 2580000003 HC RX 258: Performed by: INTERNAL MEDICINE

## 2023-02-02 PROCEDURE — 85025 COMPLETE CBC W/AUTO DIFF WBC: CPT

## 2023-02-02 PROCEDURE — 36591 DRAW BLOOD OFF VENOUS DEVICE: CPT

## 2023-02-02 PROCEDURE — 2500000003 HC RX 250 WO HCPCS: Performed by: INTERNAL MEDICINE

## 2023-02-02 PROCEDURE — 96413 CHEMO IV INFUSION 1 HR: CPT

## 2023-02-02 RX ORDER — ONDANSETRON 2 MG/ML
8 INJECTION INTRAMUSCULAR; INTRAVENOUS ONCE
OUTPATIENT
Start: 2023-02-09 | End: 2023-02-09

## 2023-02-02 RX ORDER — DEXAMETHASONE SODIUM PHOSPHATE 10 MG/ML
10 INJECTION INTRAMUSCULAR; INTRAVENOUS ONCE
Status: COMPLETED | OUTPATIENT
Start: 2023-02-02 | End: 2023-02-02

## 2023-02-02 RX ORDER — FAMOTIDINE 10 MG/ML
20 INJECTION, SOLUTION INTRAVENOUS
OUTPATIENT
Start: 2023-02-09

## 2023-02-02 RX ORDER — SODIUM CHLORIDE 0.9 % (FLUSH) 0.9 %
5-40 SYRINGE (ML) INJECTION PRN
OUTPATIENT
Start: 2023-02-09

## 2023-02-02 RX ORDER — SODIUM CHLORIDE 9 MG/ML
5-250 INJECTION, SOLUTION INTRAVENOUS PRN
OUTPATIENT
Start: 2023-02-09

## 2023-02-02 RX ORDER — EPINEPHRINE 1 MG/ML
0.3 INJECTION, SOLUTION, CONCENTRATE INTRAVENOUS PRN
OUTPATIENT
Start: 2023-02-02

## 2023-02-02 RX ORDER — ALBUTEROL SULFATE 90 UG/1
4 AEROSOL, METERED RESPIRATORY (INHALATION) PRN
OUTPATIENT
Start: 2023-02-02

## 2023-02-02 RX ORDER — ALBUTEROL SULFATE 90 UG/1
4 AEROSOL, METERED RESPIRATORY (INHALATION) PRN
OUTPATIENT
Start: 2023-02-16

## 2023-02-02 RX ORDER — SODIUM CHLORIDE 0.9 % (FLUSH) 0.9 %
5-40 SYRINGE (ML) INJECTION PRN
Status: CANCELLED | OUTPATIENT
Start: 2023-02-02

## 2023-02-02 RX ORDER — EPINEPHRINE 1 MG/ML
0.3 INJECTION, SOLUTION, CONCENTRATE INTRAVENOUS PRN
OUTPATIENT
Start: 2023-02-09

## 2023-02-02 RX ORDER — SODIUM CHLORIDE 9 MG/ML
5-250 INJECTION, SOLUTION INTRAVENOUS PRN
OUTPATIENT
Start: 2023-02-16

## 2023-02-02 RX ORDER — ACETAMINOPHEN 325 MG/1
650 TABLET ORAL
OUTPATIENT
Start: 2023-02-16

## 2023-02-02 RX ORDER — FAMOTIDINE 10 MG/ML
20 INJECTION, SOLUTION INTRAVENOUS
OUTPATIENT
Start: 2023-02-02

## 2023-02-02 RX ORDER — SODIUM CHLORIDE 9 MG/ML
INJECTION, SOLUTION INTRAVENOUS CONTINUOUS
OUTPATIENT
Start: 2023-02-02

## 2023-02-02 RX ORDER — SODIUM CHLORIDE 9 MG/ML
5-250 INJECTION, SOLUTION INTRAVENOUS PRN
OUTPATIENT
Start: 2023-02-02

## 2023-02-02 RX ORDER — SODIUM CHLORIDE 9 MG/ML
5-250 INJECTION, SOLUTION INTRAVENOUS PRN
Status: CANCELLED | OUTPATIENT
Start: 2023-02-02

## 2023-02-02 RX ORDER — FAMOTIDINE 10 MG/ML
20 INJECTION, SOLUTION INTRAVENOUS
OUTPATIENT
Start: 2023-02-16

## 2023-02-02 RX ORDER — MEPERIDINE HYDROCHLORIDE 50 MG/ML
12.5 INJECTION INTRAMUSCULAR; INTRAVENOUS; SUBCUTANEOUS PRN
OUTPATIENT
Start: 2023-02-16

## 2023-02-02 RX ORDER — DIPHENHYDRAMINE HYDROCHLORIDE 50 MG/ML
50 INJECTION INTRAMUSCULAR; INTRAVENOUS
OUTPATIENT
Start: 2023-02-09

## 2023-02-02 RX ORDER — ALBUTEROL SULFATE 90 UG/1
4 AEROSOL, METERED RESPIRATORY (INHALATION) PRN
OUTPATIENT
Start: 2023-02-09

## 2023-02-02 RX ORDER — DIPHENHYDRAMINE HYDROCHLORIDE 50 MG/ML
50 INJECTION INTRAMUSCULAR; INTRAVENOUS
OUTPATIENT
Start: 2023-02-16

## 2023-02-02 RX ORDER — SODIUM CHLORIDE 9 MG/ML
5-40 INJECTION INTRAVENOUS PRN
OUTPATIENT
Start: 2023-02-02

## 2023-02-02 RX ORDER — FAMOTIDINE 10 MG/ML
20 INJECTION, SOLUTION INTRAVENOUS ONCE
Status: CANCELLED | OUTPATIENT
Start: 2023-02-02 | End: 2023-02-02

## 2023-02-02 RX ORDER — HEPARIN SODIUM (PORCINE) LOCK FLUSH IV SOLN 100 UNIT/ML 100 UNIT/ML
500 SOLUTION INTRAVENOUS PRN
OUTPATIENT
Start: 2023-02-09

## 2023-02-02 RX ORDER — EPINEPHRINE 1 MG/ML
0.3 INJECTION, SOLUTION, CONCENTRATE INTRAVENOUS PRN
OUTPATIENT
Start: 2023-02-16

## 2023-02-02 RX ORDER — MEPERIDINE HYDROCHLORIDE 50 MG/ML
12.5 INJECTION INTRAMUSCULAR; INTRAVENOUS; SUBCUTANEOUS PRN
OUTPATIENT
Start: 2023-02-02

## 2023-02-02 RX ORDER — ACETAMINOPHEN 325 MG/1
650 TABLET ORAL
OUTPATIENT
Start: 2023-02-09

## 2023-02-02 RX ORDER — SODIUM CHLORIDE 9 MG/ML
5-250 INJECTION, SOLUTION INTRAVENOUS PRN
Status: DISCONTINUED | OUTPATIENT
Start: 2023-02-02 | End: 2023-02-03 | Stop reason: HOSPADM

## 2023-02-02 RX ORDER — ONDANSETRON 2 MG/ML
8 INJECTION INTRAMUSCULAR; INTRAVENOUS ONCE
Status: COMPLETED | OUTPATIENT
Start: 2023-02-02 | End: 2023-02-02

## 2023-02-02 RX ORDER — DIPHENHYDRAMINE HYDROCHLORIDE 50 MG/ML
50 INJECTION INTRAMUSCULAR; INTRAVENOUS
OUTPATIENT
Start: 2023-02-02

## 2023-02-02 RX ORDER — ONDANSETRON 2 MG/ML
8 INJECTION INTRAMUSCULAR; INTRAVENOUS
OUTPATIENT
Start: 2023-02-09

## 2023-02-02 RX ORDER — ONDANSETRON 2 MG/ML
8 INJECTION INTRAMUSCULAR; INTRAVENOUS
OUTPATIENT
Start: 2023-02-16

## 2023-02-02 RX ORDER — SODIUM CHLORIDE 9 MG/ML
INJECTION, SOLUTION INTRAVENOUS CONTINUOUS
OUTPATIENT
Start: 2023-02-09

## 2023-02-02 RX ORDER — FAMOTIDINE 10 MG/ML
20 INJECTION, SOLUTION INTRAVENOUS ONCE
OUTPATIENT
Start: 2023-02-16 | End: 2023-02-16

## 2023-02-02 RX ORDER — HEPARIN SODIUM (PORCINE) LOCK FLUSH IV SOLN 100 UNIT/ML 100 UNIT/ML
500 SOLUTION INTRAVENOUS PRN
OUTPATIENT
Start: 2023-02-02

## 2023-02-02 RX ORDER — ONDANSETRON 2 MG/ML
8 INJECTION INTRAMUSCULAR; INTRAVENOUS ONCE
Status: CANCELLED | OUTPATIENT
Start: 2023-02-02 | End: 2023-02-02

## 2023-02-02 RX ORDER — SODIUM CHLORIDE 9 MG/ML
5-40 INJECTION INTRAVENOUS PRN
OUTPATIENT
Start: 2023-02-09

## 2023-02-02 RX ORDER — SODIUM CHLORIDE 0.9 % (FLUSH) 0.9 %
5-40 SYRINGE (ML) INJECTION PRN
OUTPATIENT
Start: 2023-02-16

## 2023-02-02 RX ORDER — HEPARIN SODIUM (PORCINE) LOCK FLUSH IV SOLN 100 UNIT/ML 100 UNIT/ML
500 SOLUTION INTRAVENOUS PRN
OUTPATIENT
Start: 2023-02-16

## 2023-02-02 RX ORDER — MEPERIDINE HYDROCHLORIDE 50 MG/ML
12.5 INJECTION INTRAMUSCULAR; INTRAVENOUS; SUBCUTANEOUS PRN
OUTPATIENT
Start: 2023-02-09

## 2023-02-02 RX ORDER — DIPHENHYDRAMINE HYDROCHLORIDE 50 MG/ML
50 INJECTION INTRAMUSCULAR; INTRAVENOUS ONCE
OUTPATIENT
Start: 2023-02-16 | End: 2023-02-16

## 2023-02-02 RX ORDER — DIPHENHYDRAMINE HYDROCHLORIDE 50 MG/ML
50 INJECTION INTRAMUSCULAR; INTRAVENOUS ONCE
OUTPATIENT
Start: 2023-02-09 | End: 2023-02-09

## 2023-02-02 RX ORDER — SODIUM CHLORIDE 0.9 % (FLUSH) 0.9 %
5-40 SYRINGE (ML) INJECTION PRN
Status: DISCONTINUED | OUTPATIENT
Start: 2023-02-02 | End: 2023-02-03 | Stop reason: HOSPADM

## 2023-02-02 RX ORDER — DIPHENHYDRAMINE HYDROCHLORIDE 50 MG/ML
50 INJECTION INTRAMUSCULAR; INTRAVENOUS ONCE
Status: COMPLETED | OUTPATIENT
Start: 2023-02-02 | End: 2023-02-02

## 2023-02-02 RX ORDER — ONDANSETRON 2 MG/ML
8 INJECTION INTRAMUSCULAR; INTRAVENOUS ONCE
OUTPATIENT
Start: 2023-02-16 | End: 2023-02-16

## 2023-02-02 RX ORDER — SODIUM CHLORIDE 9 MG/ML
INJECTION, SOLUTION INTRAVENOUS CONTINUOUS
OUTPATIENT
Start: 2023-02-16

## 2023-02-02 RX ORDER — FAMOTIDINE 10 MG/ML
20 INJECTION, SOLUTION INTRAVENOUS ONCE
OUTPATIENT
Start: 2023-02-09 | End: 2023-02-09

## 2023-02-02 RX ORDER — SODIUM CHLORIDE 0.9 % (FLUSH) 0.9 %
10 SYRINGE (ML) INJECTION PRN
Status: DISCONTINUED | OUTPATIENT
Start: 2023-02-02 | End: 2023-02-03 | Stop reason: HOSPADM

## 2023-02-02 RX ORDER — SODIUM CHLORIDE 9 MG/ML
5-40 INJECTION INTRAVENOUS PRN
OUTPATIENT
Start: 2023-02-16

## 2023-02-02 RX ORDER — DIPHENHYDRAMINE HYDROCHLORIDE 50 MG/ML
50 INJECTION INTRAMUSCULAR; INTRAVENOUS ONCE
Status: CANCELLED | OUTPATIENT
Start: 2023-02-02 | End: 2023-02-02

## 2023-02-02 RX ORDER — ACETAMINOPHEN 325 MG/1
650 TABLET ORAL
OUTPATIENT
Start: 2023-02-02

## 2023-02-02 RX ORDER — ONDANSETRON 2 MG/ML
8 INJECTION INTRAMUSCULAR; INTRAVENOUS
OUTPATIENT
Start: 2023-02-02

## 2023-02-02 RX ADMIN — ONDANSETRON 8 MG: 2 INJECTION INTRAMUSCULAR; INTRAVENOUS at 14:22

## 2023-02-02 RX ADMIN — SODIUM CHLORIDE, PRESERVATIVE FREE 10 ML: 5 INJECTION INTRAVENOUS at 11:28

## 2023-02-02 RX ADMIN — SODIUM CHLORIDE, PRESERVATIVE FREE 10 ML: 5 INJECTION INTRAVENOUS at 16:10

## 2023-02-02 RX ADMIN — DIPHENHYDRAMINE HYDROCHLORIDE 50 MG: 50 INJECTION, SOLUTION INTRAMUSCULAR; INTRAVENOUS at 12:38

## 2023-02-02 RX ADMIN — SODIUM CHLORIDE 386 MG: 9 INJECTION, SOLUTION INTRAVENOUS at 13:12

## 2023-02-02 RX ADMIN — SODIUM CHLORIDE, PRESERVATIVE FREE 10 ML: 5 INJECTION INTRAVENOUS at 12:30

## 2023-02-02 RX ADMIN — PACLITAXEL 120 MG: 6 INJECTION, SOLUTION, CONCENTRATE INTRAVENOUS at 14:58

## 2023-02-02 RX ADMIN — DEXAMETHASONE SODIUM PHOSPHATE 10 MG: 10 INJECTION INTRAMUSCULAR; INTRAVENOUS at 14:25

## 2023-02-02 RX ADMIN — FAMOTIDINE 20 MG: 10 INJECTION, SOLUTION INTRAVENOUS at 14:23

## 2023-02-02 RX ADMIN — SODIUM CHLORIDE 25 ML/HR: 9 INJECTION, SOLUTION INTRAVENOUS at 12:30

## 2023-02-02 NOTE — PATIENT INSTRUCTIONS
Patient Instructions from Today's Visit    Reason for Visit:  Pre chemo cycle 5 day 1 taxol/cyramza     Plan:  Proceed to infusion  Call [facility] for a follow up appointment with them    Follow Up:  1 week for infusion  2 weeks with provider    Recent Lab Results:  Hospital Outpatient Visit on 02/02/2023   Component Date Value Ref Range Status    WBC 02/02/2023 6.5  4.3 - 11.1 K/uL Final    RBC 02/02/2023 3.23 (A)  4.05 - 5.2 M/uL Final    Hemoglobin 02/02/2023 8.2 (A)  11.7 - 15.4 g/dL Final    Hematocrit 02/02/2023 28.7 (A)  35.8 - 46.3 % Final    MCV 02/02/2023 88.9  82.0 - 102.0 FL Final    MCH 02/02/2023 25.4 (A)  26.1 - 32.9 PG Final    MCHC 02/02/2023 28.6 (A)  31.4 - 35.0 g/dL Final    RDW 02/02/2023 21.4 (A)  11.9 - 14.6 % Final    Platelets 95/31/9000 449  150 - 450 K/uL Final    MPV 02/02/2023 10.2  9.4 - 12.3 FL Final    nRBC 02/02/2023 0.00  0.0 - 0.2 K/uL Final    **Note: Absolute NRBC parameter is now reported with Hemogram**    Seg Neutrophils 02/02/2023 61  43 - 78 % Final    Lymphocytes 02/02/2023 24  13 - 44 % Final    Monocytes 02/02/2023 14 (A)  4.0 - 12.0 % Final    Eosinophils % 02/02/2023 1  0.5 - 7.8 % Final    Basophils 02/02/2023 0  0.0 - 2.0 % Final    Immature Granulocytes 02/02/2023 0  0.0 - 5.0 % Final    Segs Absolute 02/02/2023 4.0  1.7 - 8.2 K/UL Final    Absolute Lymph # 02/02/2023 1.6  0.5 - 4.6 K/UL Final    Absolute Mono # 02/02/2023 0.9  0.1 - 1.3 K/UL Final    Absolute Eos # 02/02/2023 0.1  0.0 - 0.8 K/UL Final    Basophils Absolute 02/02/2023 0.0  0.0 - 0.2 K/UL Final    Absolute Immature Granulocyte 02/02/2023 0.0  0.0 - 0.5 K/UL Final    Differential Type 02/02/2023 AUTOMATED    Final         Treatment Summary has been discussed and given to patient: no        -------------------------------------------------------------------------------------------------------------------  Please call our office at [phone] if you have any  of the following symptoms:   Fever of 100.5 or greater  Chills  Shortness of breath  Swelling or pain in one leg    After office hours an answering service is available and will contact a provider for emergencies or if you are experiencing any of the above symptoms. Patient did express an interest in My Chart. My Chart log in information explained on the after visit summary printout at the [facility] Heather Klein 90 desk.     Jean Carlos Grijalva RN, BSN  Nurse Navigator  [phone] valente [email or web address]"

## 2023-02-02 NOTE — PROGRESS NOTES
2/2/23 saw pt today with Dr. Paola Justice for pre chemo c5d1 taxol/cyramza. She is doing well. Weight stable. On 12 hour TPN at HS. She is going to call GI for follow up. Proceed to infusion. Follow up in 1 week for infusion and 2 weeks with provider. Encouraged to call with any concerns. Navigation will continue to follow.

## 2023-02-02 NOTE — PROGRESS NOTES
Arrived to the [facility]. Cyramza/Taxol completed. Patient tolerated well. Any issues or concerns during appointment: none. Patient aware of next infusion appointment on 2/9 (date) at 8:30 AM (time). Patient instructed to call provider with temperature of 100.4 or greater or nausea/vomiting/ diarrhea or pain not controlled by medications  Discharged via w/c.

## 2023-02-03 NOTE — PROGRESS NOTES
Nutrition F/U:  Assessment:  Pt seen during office visit w/ Dr. Patti Montero, receiving D1C5 of Taxol + Cyramza today. Pt remains TPN dependent for estimated nutrition needs, infusing over 12 hrs/day, Vitamin D level was low on labs this week and 50,000 IU once a week x 8 weeks prescribed earlier this week by Dr. Patti Montero. Pt continues to eat some soft solids po, denies N/V/D with po intake, feels she could eat more if she didn't have reflux - on PPI orally and in TPN. Current BW: 114#, stable over the past 3 weeks. Intervention:  1. Continue w/ po diet as tolerated   2. Continue w/ current TPN - managed by [facility], infusing over 12 hrs/day  3. Vitamin D replacement - 50,000 IU once a week x 8 weeks     Monitoring/Evaluation:  1. RD to follow up during next office visit - follow up wt status, tolerance/intake of po diet, symptom management, nutrition related lab values w/ TPN.       [address] [facility], [address], [address] [facility]

## 2023-02-09 ENCOUNTER — HOSPITAL ENCOUNTER (OUTPATIENT)
Dept: INFUSION THERAPY | Age: 48
Discharge: HOME | End: 2023-02-09
Payer: COMMERCIAL

## 2023-02-09 VITALS
WEIGHT: 115 LBS | OXYGEN SATURATION: 100 % | BODY MASS INDEX: 19.74 KG/M2 | RESPIRATION RATE: 16 BRPM | TEMPERATURE: 98.1 F | DIASTOLIC BLOOD PRESSURE: 81 MMHG | HEART RATE: 110 BPM | SYSTOLIC BLOOD PRESSURE: 112 MMHG

## 2023-02-09 DIAGNOSIS — C80.1 CARCINOMA OF UNKNOWN PRIMARY (HCC): ICD-10-CM

## 2023-02-09 DIAGNOSIS — C80.1 METASTASIS TO PERITONEUM OF UNKNOWN PRIMARY (HCC): Primary | ICD-10-CM

## 2023-02-09 DIAGNOSIS — C78.6 METASTASIS TO PERITONEUM OF UNKNOWN PRIMARY (HCC): ICD-10-CM

## 2023-02-09 DIAGNOSIS — C78.6 METASTASIS TO PERITONEUM OF UNKNOWN PRIMARY (HCC): Primary | ICD-10-CM

## 2023-02-09 DIAGNOSIS — C80.1 METASTASIS TO PERITONEUM OF UNKNOWN PRIMARY (HCC): ICD-10-CM

## 2023-02-09 LAB
ALBUMIN SERPL-MCNC: 2.7 G/DL (ref 3.5–5)
ALBUMIN/GLOB SERPL: 0.7 (ref 0.4–1.6)
ALP SERPL-CCNC: 82 U/L (ref 50–136)
ALT SERPL-CCNC: 18 U/L (ref 12–65)
ANION GAP SERPL CALC-SCNC: 7 MMOL/L (ref 2–11)
AST SERPL-CCNC: 18 U/L (ref 15–37)
BASOPHILS # BLD: 0 K/UL (ref 0–0.2)
BASOPHILS NFR BLD: 0 % (ref 0–2)
BILIRUB SERPL-MCNC: 0.2 MG/DL (ref 0.2–1.1)
BUN SERPL-MCNC: 26 MG/DL (ref 6–23)
CALCIUM SERPL-MCNC: 8.7 MG/DL (ref 8.3–10.4)
CHLORIDE SERPL-SCNC: 105 MMOL/L (ref 101–110)
CO2 SERPL-SCNC: 27 MMOL/L (ref 21–32)
CREAT SERPL-MCNC: 0.5 MG/DL (ref 0.6–1)
DIFFERENTIAL METHOD BLD: ABNORMAL
EOSINOPHIL # BLD: 0.1 K/UL (ref 0–0.8)
EOSINOPHIL NFR BLD: 2 % (ref 0.5–7.8)
ERYTHROCYTE [DISTWIDTH] IN BLOOD BY AUTOMATED COUNT: 20.8 % (ref 11.9–14.6)
GLOBULIN SER CALC-MCNC: 4 G/DL (ref 2.8–4.5)
GLUCOSE SERPL-MCNC: 109 MG/DL (ref 65–100)
HCT VFR BLD AUTO: 28.1 % (ref 35.8–46.3)
HGB BLD-MCNC: 8.2 G/DL (ref 11.7–15.4)
IMM GRANULOCYTES # BLD AUTO: 0.1 K/UL (ref 0–0.5)
IMM GRANULOCYTES NFR BLD AUTO: 1 % (ref 0–5)
LYMPHOCYTES # BLD: 1.6 K/UL (ref 0.5–4.6)
LYMPHOCYTES NFR BLD: 26 % (ref 13–44)
MCH RBC QN AUTO: 25.5 PG (ref 26.1–32.9)
MCHC RBC AUTO-ENTMCNC: 29.2 G/DL (ref 31.4–35)
MCV RBC AUTO: 87.3 FL (ref 82–102)
MONOCYTES # BLD: 0.3 K/UL (ref 0.1–1.3)
MONOCYTES NFR BLD: 4 % (ref 4–12)
NEUTS SEG # BLD: 4.1 K/UL (ref 1.7–8.2)
NEUTS SEG NFR BLD: 67 % (ref 43–78)
NRBC # BLD: 0 K/UL (ref 0–0.2)
PLATELET # BLD AUTO: 367 K/UL (ref 150–450)
PMV BLD AUTO: 10.4 FL (ref 9.4–12.3)
POTASSIUM SERPL-SCNC: 4.1 MMOL/L (ref 3.5–5.1)
PROT SERPL-MCNC: 6.7 G/DL (ref 6.3–8.2)
RBC # BLD AUTO: 3.22 M/UL (ref 4.05–5.2)
SODIUM SERPL-SCNC: 139 MMOL/L (ref 133–143)
WBC # BLD AUTO: 6.2 K/UL (ref 4.3–11.1)

## 2023-02-09 PROCEDURE — 6360000002 HC RX W HCPCS: Performed by: INTERNAL MEDICINE

## 2023-02-09 PROCEDURE — 80053 COMPREHEN METABOLIC PANEL: CPT

## 2023-02-09 PROCEDURE — 85025 COMPLETE CBC W/AUTO DIFF WBC: CPT

## 2023-02-09 PROCEDURE — 2580000003 HC RX 258: Performed by: INTERNAL MEDICINE

## 2023-02-09 PROCEDURE — A4216 STERILE WATER/SALINE, 10 ML: HCPCS | Performed by: INTERNAL MEDICINE

## 2023-02-09 PROCEDURE — 96375 TX/PRO/DX INJ NEW DRUG ADDON: CPT

## 2023-02-09 PROCEDURE — 2500000003 HC RX 250 WO HCPCS: Performed by: INTERNAL MEDICINE

## 2023-02-09 PROCEDURE — 96413 CHEMO IV INFUSION 1 HR: CPT

## 2023-02-09 RX ORDER — SODIUM CHLORIDE 9 MG/ML
5-250 INJECTION, SOLUTION INTRAVENOUS PRN
Status: DISCONTINUED | OUTPATIENT
Start: 2023-02-09 | End: 2023-02-10 | Stop reason: HOSPADM

## 2023-02-09 RX ORDER — ONDANSETRON 2 MG/ML
8 INJECTION INTRAMUSCULAR; INTRAVENOUS ONCE
Status: COMPLETED | OUTPATIENT
Start: 2023-02-09 | End: 2023-02-09

## 2023-02-09 RX ORDER — DIPHENHYDRAMINE HYDROCHLORIDE 50 MG/ML
50 INJECTION INTRAMUSCULAR; INTRAVENOUS ONCE
Status: COMPLETED | OUTPATIENT
Start: 2023-02-09 | End: 2023-02-09

## 2023-02-09 RX ORDER — DEXAMETHASONE SODIUM PHOSPHATE 10 MG/ML
10 INJECTION INTRAMUSCULAR; INTRAVENOUS ONCE
Status: COMPLETED | OUTPATIENT
Start: 2023-02-09 | End: 2023-02-09

## 2023-02-09 RX ORDER — SODIUM CHLORIDE 0.9 % (FLUSH) 0.9 %
5-40 SYRINGE (ML) INJECTION PRN
Status: DISCONTINUED | OUTPATIENT
Start: 2023-02-09 | End: 2023-02-10 | Stop reason: HOSPADM

## 2023-02-09 RX ADMIN — SODIUM CHLORIDE 250 ML/HR: 9 INJECTION, SOLUTION INTRAVENOUS at 08:54

## 2023-02-09 RX ADMIN — ONDANSETRON 8 MG: 2 INJECTION INTRAMUSCULAR; INTRAVENOUS at 08:47

## 2023-02-09 RX ADMIN — SODIUM CHLORIDE, PRESERVATIVE FREE 10 ML: 5 INJECTION INTRAVENOUS at 08:44

## 2023-02-09 RX ADMIN — DIPHENHYDRAMINE HYDROCHLORIDE 50 MG: 50 INJECTION, SOLUTION INTRAMUSCULAR; INTRAVENOUS at 08:49

## 2023-02-09 RX ADMIN — SODIUM CHLORIDE, PRESERVATIVE FREE 10 ML: 5 INJECTION INTRAVENOUS at 10:36

## 2023-02-09 RX ADMIN — FAMOTIDINE 20 MG: 10 INJECTION, SOLUTION INTRAVENOUS at 08:45

## 2023-02-09 RX ADMIN — DEXAMETHASONE SODIUM PHOSPHATE 10 MG: 10 INJECTION INTRAMUSCULAR; INTRAVENOUS at 08:51

## 2023-02-09 RX ADMIN — PACLITAXEL 120 MG: 6 INJECTION, SOLUTION, CONCENTRATE INTRAVENOUS at 09:28

## 2023-02-09 NOTE — PROGRESS NOTES
Pt arrived via wheelchair, port needle changed per protocol, labs drawn and reviewed, Taxol infused, pt tolerated well, discharged via wheelchair

## 2023-02-16 ENCOUNTER — CLINICAL DOCUMENTATION (OUTPATIENT)
Dept: CASE MANAGEMENT | Age: 48
End: 2023-02-16

## 2023-02-16 ENCOUNTER — HOSPITAL ENCOUNTER (OUTPATIENT)
Dept: INFUSION THERAPY | Age: 48
Discharge: HOME | End: 2023-02-16
Payer: COMMERCIAL

## 2023-02-16 ENCOUNTER — OFFICE VISIT (OUTPATIENT)
Dept: ONCOLOGY | Age: 48
End: 2023-02-16
Payer: COMMERCIAL

## 2023-02-16 VITALS
DIASTOLIC BLOOD PRESSURE: 82 MMHG | TEMPERATURE: 99 F | WEIGHT: 116 LBS | HEIGHT: 64 IN | BODY MASS INDEX: 19.81 KG/M2 | HEART RATE: 111 BPM | OXYGEN SATURATION: 98 % | RESPIRATION RATE: 14 BRPM | SYSTOLIC BLOOD PRESSURE: 118 MMHG

## 2023-02-16 DIAGNOSIS — C80.1 CARCINOMA OF UNKNOWN PRIMARY (HCC): ICD-10-CM

## 2023-02-16 DIAGNOSIS — C78.6 METASTASIS TO PERITONEUM OF UNKNOWN PRIMARY (HCC): ICD-10-CM

## 2023-02-16 DIAGNOSIS — K21.9 GASTROESOPHAGEAL REFLUX DISEASE, UNSPECIFIED WHETHER ESOPHAGITIS PRESENT: ICD-10-CM

## 2023-02-16 DIAGNOSIS — C78.6 PERITONEAL CARCINOMATOSIS (HCC): ICD-10-CM

## 2023-02-16 DIAGNOSIS — C78.6 METASTASIS TO PERITONEUM OF UNKNOWN PRIMARY (HCC): Primary | ICD-10-CM

## 2023-02-16 DIAGNOSIS — R53.83 FATIGUE DUE TO TREATMENT: ICD-10-CM

## 2023-02-16 DIAGNOSIS — G62.0 CHEMOTHERAPY-INDUCED NEUROPATHY (HCC): ICD-10-CM

## 2023-02-16 DIAGNOSIS — C80.1 METASTASIS TO PERITONEUM OF UNKNOWN PRIMARY (HCC): Primary | ICD-10-CM

## 2023-02-16 DIAGNOSIS — C80.1 METASTASIS TO PERITONEUM OF UNKNOWN PRIMARY (HCC): ICD-10-CM

## 2023-02-16 DIAGNOSIS — C80.1 CARCINOMA OF UNKNOWN PRIMARY (HCC): Primary | ICD-10-CM

## 2023-02-16 DIAGNOSIS — T45.1X5A CHEMOTHERAPY-INDUCED NEUROPATHY (HCC): ICD-10-CM

## 2023-02-16 DIAGNOSIS — Z78.9 ON TOTAL PARENTERAL NUTRITION (TPN): ICD-10-CM

## 2023-02-16 LAB
ALBUMIN SERPL-MCNC: 2.8 G/DL (ref 3.5–5)
ALBUMIN/GLOB SERPL: 0.7 (ref 0.4–1.6)
ALP SERPL-CCNC: 90 U/L (ref 50–136)
ALT SERPL-CCNC: 17 U/L (ref 12–65)
ANION GAP SERPL CALC-SCNC: 3 MMOL/L (ref 2–11)
APPEARANCE UR: ABNORMAL
AST SERPL-CCNC: 17 U/L (ref 15–37)
BACTERIA URNS QL MICRO: NORMAL /HPF
BASOPHILS # BLD: 0 K/UL (ref 0–0.2)
BASOPHILS NFR BLD: 1 % (ref 0–2)
BILIRUB SERPL-MCNC: 0.2 MG/DL (ref 0.2–1.1)
BILIRUB UR QL: NEGATIVE
BUN SERPL-MCNC: 28 MG/DL (ref 6–23)
CALCIUM SERPL-MCNC: 8.4 MG/DL (ref 8.3–10.4)
CASTS URNS QL MICRO: 0 /LPF
CHLORIDE SERPL-SCNC: 106 MMOL/L (ref 101–110)
CO2 SERPL-SCNC: 28 MMOL/L (ref 21–32)
COLOR UR: YELLOW
CREAT SERPL-MCNC: 0.5 MG/DL (ref 0.6–1)
CRYSTALS URNS QL MICRO: 0 /LPF
DIFFERENTIAL METHOD BLD: ABNORMAL
EOSINOPHIL # BLD: 0.2 K/UL (ref 0–0.8)
EOSINOPHIL NFR BLD: 5 % (ref 0.5–7.8)
EPI CELLS #/AREA URNS HPF: NORMAL /HPF
ERYTHROCYTE [DISTWIDTH] IN BLOOD BY AUTOMATED COUNT: 21.4 % (ref 11.9–14.6)
GLOBULIN SER CALC-MCNC: 4.1 G/DL (ref 2.8–4.5)
GLUCOSE SERPL-MCNC: 127 MG/DL (ref 65–100)
GLUCOSE UR STRIP.AUTO-MCNC: NEGATIVE MG/DL
HCT VFR BLD AUTO: 27.1 % (ref 35.8–46.3)
HGB BLD-MCNC: 7.9 G/DL (ref 11.7–15.4)
HGB UR QL STRIP: ABNORMAL
IMM GRANULOCYTES # BLD AUTO: 0 K/UL (ref 0–0.5)
IMM GRANULOCYTES NFR BLD AUTO: 1 % (ref 0–5)
KETONES UR QL STRIP.AUTO: NEGATIVE MG/DL
LEUKOCYTE ESTERASE UR QL STRIP.AUTO: ABNORMAL
LYMPHOCYTES # BLD: 1.4 K/UL (ref 0.5–4.6)
LYMPHOCYTES NFR BLD: 31 % (ref 13–44)
MAGNESIUM SERPL-MCNC: 2.5 MG/DL (ref 1.8–2.4)
MCH RBC QN AUTO: 25.5 PG (ref 26.1–32.9)
MCHC RBC AUTO-ENTMCNC: 29.2 G/DL (ref 31.4–35)
MCV RBC AUTO: 87.4 FL (ref 82–102)
MONOCYTES # BLD: 0.2 K/UL (ref 0.1–1.3)
MONOCYTES NFR BLD: 5 % (ref 4–12)
MUCOUS THREADS URNS QL MICRO: 0 /LPF
NEUTS SEG # BLD: 2.6 K/UL (ref 1.7–8.2)
NEUTS SEG NFR BLD: 57 % (ref 43–78)
NITRITE UR QL STRIP.AUTO: NEGATIVE
NRBC # BLD: 0 K/UL (ref 0–0.2)
PH UR STRIP: 7 (ref 5–9)
PLATELET # BLD AUTO: 318 K/UL (ref 150–450)
PMV BLD AUTO: 10.9 FL (ref 9.4–12.3)
POTASSIUM SERPL-SCNC: 4.2 MMOL/L (ref 3.5–5.1)
PROT SERPL-MCNC: 6.9 G/DL (ref 6.3–8.2)
PROT UR STRIP-MCNC: >=300 MG/DL
PROT UR-MCNC: 159 MG/DL
RBC # BLD AUTO: 3.1 M/UL (ref 4.05–5.2)
RBC #/AREA URNS HPF: >100 /HPF
SODIUM SERPL-SCNC: 137 MMOL/L (ref 133–143)
SP GR UR REFRACTOMETRY: 1.02 (ref 1–1.02)
UROBILINOGEN UR QL STRIP.AUTO: 0.2 EU/DL
WBC # BLD AUTO: 4.4 K/UL (ref 4.3–11.1)
WBC URNS QL MICRO: NORMAL /HPF

## 2023-02-16 PROCEDURE — 84156 ASSAY OF PROTEIN URINE: CPT

## 2023-02-16 PROCEDURE — A4216 STERILE WATER/SALINE, 10 ML: HCPCS | Performed by: INTERNAL MEDICINE

## 2023-02-16 PROCEDURE — 85025 COMPLETE CBC W/AUTO DIFF WBC: CPT

## 2023-02-16 PROCEDURE — 2580000003 HC RX 258: Performed by: INTERNAL MEDICINE

## 2023-02-16 PROCEDURE — 96375 TX/PRO/DX INJ NEW DRUG ADDON: CPT

## 2023-02-16 PROCEDURE — 80053 COMPREHEN METABOLIC PANEL: CPT

## 2023-02-16 PROCEDURE — 96413 CHEMO IV INFUSION 1 HR: CPT

## 2023-02-16 PROCEDURE — 81001 URINALYSIS AUTO W/SCOPE: CPT

## 2023-02-16 PROCEDURE — 99214 OFFICE O/P EST MOD 30 MIN: CPT | Performed by: NURSE PRACTITIONER

## 2023-02-16 PROCEDURE — 36591 DRAW BLOOD OFF VENOUS DEVICE: CPT

## 2023-02-16 PROCEDURE — 6360000002 HC RX W HCPCS: Performed by: INTERNAL MEDICINE

## 2023-02-16 PROCEDURE — 2500000003 HC RX 250 WO HCPCS: Performed by: INTERNAL MEDICINE

## 2023-02-16 PROCEDURE — 83735 ASSAY OF MAGNESIUM: CPT

## 2023-02-16 RX ORDER — DIPHENHYDRAMINE HYDROCHLORIDE 50 MG/ML
50 INJECTION INTRAMUSCULAR; INTRAVENOUS ONCE
Status: COMPLETED | OUTPATIENT
Start: 2023-02-16 | End: 2023-02-16

## 2023-02-16 RX ORDER — SODIUM CHLORIDE 0.9 % (FLUSH) 0.9 %
10 SYRINGE (ML) INJECTION PRN
Status: DISCONTINUED | OUTPATIENT
Start: 2023-02-16 | End: 2023-02-17 | Stop reason: HOSPADM

## 2023-02-16 RX ORDER — SODIUM CHLORIDE 9 MG/ML
5-250 INJECTION, SOLUTION INTRAVENOUS PRN
Status: DISCONTINUED | OUTPATIENT
Start: 2023-02-16 | End: 2023-02-17 | Stop reason: HOSPADM

## 2023-02-16 RX ORDER — SODIUM CHLORIDE 0.9 % (FLUSH) 0.9 %
5-40 SYRINGE (ML) INJECTION PRN
Status: DISCONTINUED | OUTPATIENT
Start: 2023-02-16 | End: 2023-02-17 | Stop reason: HOSPADM

## 2023-02-16 RX ORDER — DEXAMETHASONE SODIUM PHOSPHATE 10 MG/ML
10 INJECTION INTRAMUSCULAR; INTRAVENOUS ONCE
Status: COMPLETED | OUTPATIENT
Start: 2023-02-16 | End: 2023-02-16

## 2023-02-16 RX ORDER — ONDANSETRON 2 MG/ML
8 INJECTION INTRAMUSCULAR; INTRAVENOUS ONCE
Status: COMPLETED | OUTPATIENT
Start: 2023-02-16 | End: 2023-02-16

## 2023-02-16 RX ADMIN — PACLITAXEL 120 MG: 6 INJECTION, SOLUTION, CONCENTRATE INTRAVENOUS at 10:33

## 2023-02-16 RX ADMIN — FAMOTIDINE 20 MG: 10 INJECTION, SOLUTION INTRAVENOUS at 10:11

## 2023-02-16 RX ADMIN — SODIUM CHLORIDE, PRESERVATIVE FREE 10 ML: 5 INJECTION INTRAVENOUS at 08:48

## 2023-02-16 RX ADMIN — SODIUM CHLORIDE, PRESERVATIVE FREE 10 ML: 5 INJECTION INTRAVENOUS at 10:17

## 2023-02-16 RX ADMIN — SODIUM CHLORIDE 100 ML/HR: 9 INJECTION, SOLUTION INTRAVENOUS at 10:18

## 2023-02-16 RX ADMIN — SODIUM CHLORIDE, PRESERVATIVE FREE 10 ML: 5 INJECTION INTRAVENOUS at 11:56

## 2023-02-16 RX ADMIN — ONDANSETRON 8 MG: 2 INJECTION INTRAMUSCULAR; INTRAVENOUS at 10:09

## 2023-02-16 RX ADMIN — DIPHENHYDRAMINE HYDROCHLORIDE 50 MG: 50 INJECTION, SOLUTION INTRAMUSCULAR; INTRAVENOUS at 10:15

## 2023-02-16 RX ADMIN — DEXAMETHASONE SODIUM PHOSPHATE 10 MG: 10 INJECTION INTRAMUSCULAR; INTRAVENOUS at 10:13

## 2023-02-16 ASSESSMENT — PATIENT HEALTH QUESTIONNAIRE - PHQ9
1. LITTLE INTEREST OR PLEASURE IN DOING THINGS: 0
SUM OF ALL RESPONSES TO PHQ QUESTIONS 1-9: 0
2. FEELING DOWN, DEPRESSED OR HOPELESS: 0
SUM OF ALL RESPONSES TO PHQ9 QUESTIONS 1 & 2: 0
SUM OF ALL RESPONSES TO PHQ QUESTIONS 1-9: 0

## 2023-02-16 NOTE — PROGRESS NOTES
Pt arrived via wheelchair, Taxol completed, cyramza held for urine protein, pt tolerated well, port needle changed per protocol,  pt discharged via wheelchair

## 2023-02-16 NOTE — PATIENT INSTRUCTIONS
Patient Instructions from Today's Visit    Reason for Visit:  Pre chemo cycle 5 day 15 taxol/cyramza     Plan:  Proceed to infusion  Repeat CT end of March   Appointment with GI 3/6    Follow Up:  2 weeks    Recent Lab Results:  Hospital Outpatient Visit on 02/16/2023   Component Date Value Ref Range Status    WBC 02/16/2023 4.4  4.3 - 11.1 K/uL Final    RBC 02/16/2023 3.10 (A)  4.05 - 5.2 M/uL Final    Hemoglobin 02/16/2023 7.9 (A)  11.7 - 15.4 g/dL Final    Hematocrit 02/16/2023 27.1 (A)  35.8 - 46.3 % Final    MCV 02/16/2023 87.4  82.0 - 102.0 FL Final    MCH 02/16/2023 25.5 (A)  26.1 - 32.9 PG Final    MCHC 02/16/2023 29.2 (A)  31.4 - 35.0 g/dL Final    RDW 02/16/2023 21.4 (A)  11.9 - 14.6 % Final    Platelets 41/44/1969 318  150 - 450 K/uL Final    MPV 02/16/2023 10.9  9.4 - 12.3 FL Final    nRBC 02/16/2023 0.00  0.0 - 0.2 K/uL Final    **Note: Absolute NRBC parameter is now reported with Hemogram**    Seg Neutrophils 02/16/2023 57  43 - 78 % Final    Lymphocytes 02/16/2023 31  13 - 44 % Final    Monocytes 02/16/2023 5  4.0 - 12.0 % Final    Eosinophils % 02/16/2023 5  0.5 - 7.8 % Final    Basophils 02/16/2023 1  0.0 - 2.0 % Final    Immature Granulocytes 02/16/2023 1  0.0 - 5.0 % Final    Segs Absolute 02/16/2023 2.6  1.7 - 8.2 K/UL Final    Absolute Lymph # 02/16/2023 1.4  0.5 - 4.6 K/UL Final    Absolute Mono # 02/16/2023 0.2  0.1 - 1.3 K/UL Final    Absolute Eos # 02/16/2023 0.2  0.0 - 0.8 K/UL Final    Basophils Absolute 02/16/2023 0.0  0.0 - 0.2 K/UL Final    Absolute Immature Granulocyte 02/16/2023 0.0  0.0 - 0.5 K/UL Final    Differential Type 02/16/2023 AUTOMATED    Final         Treatment Summary has been discussed and given to patient: no        -------------------------------------------------------------------------------------------------------------------  Please call our office at [phone] if you have any  of the following symptoms:   Fever of 100.5 or greater  Chills  Shortness of breath  Swelling or pain in one leg    After office hours an answering service is available and will contact a provider for emergencies or if you are experiencing any of the above symptoms. Patient did express an interest in My Chart. My Chart log in information explained on the after visit summary printout at the [facility] Heather Ledezmaa [employer] desk.     Sergey Phelan RN, BSN  Nurse Navigator  [phone] cell  [email or web address]

## 2023-02-16 NOTE — PROGRESS NOTES
[facility] Hematology and Oncology: Office Visit Established Patient    Reason for follow up:    Caty Raphael  is seen in follow-up for carcinoma of unknown primary. Overview: (copied from prior)  Ms. Howard Cochran was seen for the first time in our office in February 2022. She was a woman of previously good health who began noticing left-sided back discomfort in early January 2022. This was associated  ultimately with a sense of difficulty swallowing and ultimately she presented to [facility] for evaluation. Her symptoms were felt to potentially be indicative of a bowel obstruction and she was sent for a CT scan. This study, performed on January 27, 2022  was notable for a linear calcific density along the course of the proximal left ureter with associated moderate hydronephrosis potentially indicative of an intraureteral calculus. There was also stranding throughout the retroperitoneal soft tissues anterior  to the left psoas muscle with pelvic ascites. There was also wall thickening involving the descending colon. The question of colitis or serosal implants was raised. The patient had undergone a gastric sleeve placement several years prior. She had  also undergone a negative colonoscopy about 3 years prior to these presentations. There was a history of anemia ultimately resulting in the performance of a hysterectomy approximately 3 years ago for menorrhagia. Because of the CT scan findings, she  was ultimately referred to Dr. Senia Jiménez for evaluation of a possible pelvic malignancy. On February 1, 2022 he performed a laparoscopy and biopsy with evidence of peritoneal carcinomatosis grossly. A \"peritoneal nodule\" and a \"peritoneal implant\"  were both positive for a poorly differentiated metastatic carcinoma potentially consistent with an upper gastrointestinal primary. An omental biopsy showed no evidence of malignancy.   Immunohistochemistries were positive for cytokeratin 7 and negative  for cytokeratin 20.  The tumor was weakly positive for CDX2.  The only other positive study was MOC-31.  Testing through [facility] demonstrated no mutation and ERB-B2.  There was no microsatellite instability and no mismatch repair protein deficiencies.  There were no targetable lesions.  A PET scan was subsequently performed on  showing elevated FDG uptake in the left pelvis corresponding with a masslike density concerning  for peritoneal carcinomatosis.  There was a small volume of free fluid within the peritoneal cavity.  There was moderate right hydroureteronephrosis.  Despite the pathologic findings, there was no evidence of FDG activity in the upper gastrointestinal  tract.  CA-125 was slightly elevated at 44.  Given the uncertainty of her diagnosis, the patient went back for additional surgery on February 15 at which time she underwent bilateral ureteral stent placement and bilateral salpingo-oophorectomies.  Pathology  from that surgery was notable for poorly differentiated carcinoma with occasional signet ring features.  Immunohistochemical stains were most consistent with a tumor of the upper gastrointestinal tract.  She is  1 para 1 abortus 0.  There is no  family history of cancer.  She has had no difficulties with vomiting.  She still notes that some food traverses her esophagus slowly.  She had undergone a prior dilatation without any evidence of cancer.  She subsequently began treatment with FOLFOX ultimately  with the addition of bevacizumab in 2022. She ultimately received 8 cycles of FOLFOX most of them with Avastin.  On , she was taken to surgery for evaluation of debulking and HIPEC.  Unfortunately, at the time of surgery her tumor was found to be tightly adherent and impossible to debulk.  Despite the FOLFOX, she developed progressive ascites necessitating placement of a Pleurx catheter.  Based on this symptomatic progression, a decision was made to place her on  paclitaxel and ramucirumab. In October, she was hospitalized for recurrent nausea and vomiting. She was placed on intravenous fluids and seen by gastroenterology. She underwent an esophageal dilatation as part of her EGD. Most importantly however a decision was made to place her on TPN.      1/26/2023: PROCEDURES PERFORMED:  Cystoscopy with left retrograde pyelogram, right retrograde pyelogram, exchange of right ureteral stent. Interval history:  2/16/23 She returns today for follow up regarding her history of unknown primary for cycle 5, day 15 Cyramza + Taxol. She has been well overall, in good spirits. Fatigued but manageable. She denies any nausea or vomiting. No bowel complaints. No mucositis. GERD ongoing and she is following up with GI soon. She feels like if this was better controlled, she could eat. She is on Pepcid in her TPN and Protonix daily. Weight is stable. She denies any significant pain. She has miminal bleeding in her nostrils. No other bleeding noted. No recent fevers, chills or other infectious symptoms. She has been draining her Pleurex every 2-3 weeks and the last time she did this - the fluid was red. Labs reviewed and Hgb stable. Review of Systems:  14 point ROS was negative except as per HPI      ECOG PERFORMANCE STATUS - 2 - Ambulatory and capable of all selfcare but unable to carry out any work activities. Up and about more than 50% of waking hours. Pain - /10. None/Minimal pain - not affecting QOL     Fatigue - No flowsheet data found. Distress - No flowsheet data found.          Reviewed and updated this visit by provider:  Tobacco  Allergies  Meds  Problems  Med Hx  Surg Hx  Fam Hx          Current Outpatient Medications   Medication Sig Dispense Refill    vitamin D (ERGOCALCIFEROL) 1.25 MG (11590 UT) CAPS capsule Take 1 capsule by mouth every 7 days 8 capsule 0    pantoprazole (PROTONIX) 40 MG tablet Take 1 tablet by mouth in the morning and at bedtime 60 tablet 0    metoclopramide (REGLAN) 5 MG tablet Take 1 tablet by mouth 3 times daily (Patient not taking: Reported on 2/16/2023) 90 tablet 0    lidocaine-prilocaine (EMLA) 2.5-2.5 % cream PRN  Apply to port about 45 minutes prior to access (Patient not taking: Reported on 2/16/2023)      ondansetron (ZOFRAN) 8 MG tablet Take 8 mg by mouth every 8 hours as needed (Patient not taking: Reported on 2/16/2023)       No current facility-administered medications for this visit. Facility-Administered Medications Ordered in Other Visits   Medication Dose Route Frequency Provider Last Rate Last Admin    sodium chloride flush 0.9 % injection 10 mL  10 mL IntraVENous PRN Raymond Bergeron MD   10 mL at 02/16/23 0848    0.9 % sodium chloride infusion  5-250 mL/hr IntraVENous PRN Oral MD Sydnee        ondansetron [facility]) injection 8 mg  8 mg IntraVENous Once Oral MD Sydnee        famotidine (PEPCID) 20 mg in sodium chloride (PF) 0.9 % 10 mL injection  20 mg IntraVENous Once Oral MD Sydnee        dexamethasone (DECADRON) injection 10 mg/mL  10 mg IntraVENous Once Oral MD Sydnee        PACLitaxel (TAXOL) 120 mg in sodium chloride 0.9 % 250 mL chemo infusion  80 mg/m2 (Treatment Plan Recorded) IntraVENous Once Oral MD Sydnee        sodium chloride flush 0.9 % injection 5-40 mL  5-40 mL IntraVENous PRN Oral MD Sydnee            OBJECTIVE:  /82   Pulse (!) 111   Temp 99 °F (37.2 °C) (Oral)   Resp 14   Ht 5' 4\" (1.626 m)   Wt 116 lb (52.6 kg)   SpO2 98%   BMI 19.91 kg/m²   Wt Readings from Last 3 Encounters:   02/16/23 116 lb (52.6 kg)   02/09/23 115 lb (52.2 kg)   02/02/23 114 lb (51.7 kg)       Physical Exam:  Patient is alert and oriented x3. She is in no acute distress. Neck: Supple. There is no thyromegaly  Lungs: The lungs are clear to auscultation. There is no chest wall tenderness and no use of accessory respiratory musculature. Heart: There is no jugular venous distention.  The rate is normal and rhythm regular. The S1 and S2 are normal and there are no murmurs or rubs. Abdomen: Soft, non-tender, distended, bowel sounds present and normal. peritoneal drainage catheter in place. Skin: No rash, petechiae or ecchymoses. No evidence of malignancy. Extremities: No cyanosis, clubbing; trace lower extremity edema. Labs:  Lab Results   Component Value Date    WBC 4.4 02/16/2023    HGB 7.9 (L) 02/16/2023    HCT 27.1 (L) 02/16/2023    MCV 87.4 02/16/2023     02/16/2023     Lab Results   Component Value Date    NEUTROABS 4.2 05/12/2022    LYMPHOPCT 31 02/16/2023    LYMPHSABS 1.4 02/16/2023    MONOPCT 5 02/16/2023    MONOSABS 0.2 02/16/2023    EOSABS 0.2 02/16/2023    BASOPCT 1 02/16/2023     Lab Results   Component Value Date     02/16/2023    K 4.2 02/16/2023     02/16/2023    CO2 28 02/16/2023    BUN 28 (H) 02/16/2023    CREATININE 0.50 (L) 02/16/2023    GLUCOSE 127 (H) 02/16/2023    CALCIUM 8.4 02/16/2023    PROT 6.9 02/16/2023    LABALBU 2.8 (L) 02/16/2023    BILITOT 0.2 02/16/2023    ALKPHOS 90 02/16/2023    AST 17 02/16/2023    ALT 17 02/16/2023    LABGLOM >60 02/16/2023    GFRAA >60 09/27/2022    AGRATIO 1.1 (L) 05/12/2022    GLOB 4.1 02/16/2023               Imaging:  No results found. ASSESSMENT:  Ms. Chiara Ortiz has an apparent metastatic carcinoma of unknown primary. Pathologically, the tumor seems to resemble a process arising in the upper gastrointestinal tract. However, any such primary is  not visible on EGD or PET scanning. Dr. Melanie Rosas presumption is that this may have originated from the colon based on its location and behavior. That too would be somewhat inconsistent with the pathology findings. She has been treated with FOLFOX and Avastin. There was certainly no symptomatic response and in fact she developed progressive and symptomatic ascites suggesting disease progression. She is now on TPN and receiving paclitaxel and ramucirumab.   Although there have been no obvious radiographic changes, she does seem to be responding. The drainage of her ascites is much less. She is actually able to eat some food intake and nutrition bit by mouth. She continues to receive TPN. She seems a bit stronger. Protein calorie malnutrition-now on TPN     PLAN:  She returns today for follow up regarding her history of unknown primary for cycle 5, day 15 Cyramza + Taxol. She has been well overall, in good spirits. Fatigued but manageable. She denies any nausea or vomiting. No bowel complaints. No mucositis. GERD ongoing and she is following up with GI soon. She feels like if this was better controlled, she could eat. She is on Pepcid in her TPN and Protonix daily. Weight is stable. She denies any significant pain. She has miminal bleeding in her nostrils. No other bleeding noted. No recent fevers, chills or other infectious symptoms. She has been draining her Pleurex every 2-3 weeks and the last time she did this - the fluid was red. She will drain again next week and will let us know if this continues. No ominous signs like pain, increased ascites, Hgb drop, etc.  Labs reviewed and Hgb stable. ANC/platelets adequate to proceed. Urine protein with protein > 300 mg (also with large blood). Garo Reagan today but proceed with taxol. Follow up in two weeks for cycle 6 or sooner if needed.                BOB Elliott  [facility] Hematology and Oncology  [id number] [place] [address]  Office : [phone]  Fax : [phone]

## 2023-02-16 NOTE — PROGRESS NOTES
2/16/23 saw pt today with Christi Kramer NP for pre chemo [id number]. She is tolerating chemo well. Continues on TPN 12 hours at HS. Draining about every 3 weeks now. Appointment with GI on 3/6 to evaluate esophageal strictures. Proceed to infusion. Follow up in 2 weeks. Plan repeat imaging end of March. Encouraged to call with any concerns. Navigation will continue to follow.

## 2023-02-16 NOTE — PROGRESS NOTES
Patient arrived to port lab for picc access and lab draw   Picc accessed and labs drawn per protocol   *Picc remains accessed   Patient discharged from port lab via w/c*

## 2023-02-23 ENCOUNTER — TELEPHONE (OUTPATIENT)
Dept: ONCOLOGY | Age: 48
End: 2023-02-23

## 2023-02-23 ENCOUNTER — HOSPITAL ENCOUNTER (OUTPATIENT)
Dept: INFUSION THERAPY | Age: 48
Discharge: HOME | End: 2023-02-23
Payer: COMMERCIAL

## 2023-02-23 VITALS
TEMPERATURE: 98 F | RESPIRATION RATE: 16 BRPM | HEART RATE: 79 BPM | SYSTOLIC BLOOD PRESSURE: 122 MMHG | DIASTOLIC BLOOD PRESSURE: 77 MMHG

## 2023-02-23 DIAGNOSIS — C78.6 METASTASIS TO PERITONEUM OF UNKNOWN PRIMARY (HCC): ICD-10-CM

## 2023-02-23 DIAGNOSIS — R11.2 CINV (CHEMOTHERAPY-INDUCED NAUSEA AND VOMITING): Primary | ICD-10-CM

## 2023-02-23 DIAGNOSIS — T45.1X5A CINV (CHEMOTHERAPY-INDUCED NAUSEA AND VOMITING): Primary | ICD-10-CM

## 2023-02-23 DIAGNOSIS — C80.1 METASTASIS TO PERITONEUM OF UNKNOWN PRIMARY (HCC): ICD-10-CM

## 2023-02-23 PROCEDURE — 2580000003 HC RX 258: Performed by: NURSE PRACTITIONER

## 2023-02-23 PROCEDURE — 96374 THER/PROPH/DIAG INJ IV PUSH: CPT

## 2023-02-23 PROCEDURE — 6360000002 HC RX W HCPCS: Performed by: NURSE PRACTITIONER

## 2023-02-23 PROCEDURE — 2580000003 HC RX 258: Performed by: INTERNAL MEDICINE

## 2023-02-23 RX ORDER — ONDANSETRON 2 MG/ML
8 INJECTION INTRAMUSCULAR; INTRAVENOUS EVERY 6 HOURS PRN
Status: CANCELLED
Start: 2023-02-23

## 2023-02-23 RX ORDER — 0.9 % SODIUM CHLORIDE 0.9 %
1000 INTRAVENOUS SOLUTION INTRAVENOUS ONCE
Status: CANCELLED
Start: 2023-02-23 | End: 2023-02-23

## 2023-02-23 RX ORDER — SODIUM CHLORIDE 0.9 % (FLUSH) 0.9 %
5-40 SYRINGE (ML) INJECTION PRN
Status: DISCONTINUED | OUTPATIENT
Start: 2023-02-23 | End: 2023-02-24 | Stop reason: HOSPADM

## 2023-02-23 RX ORDER — 0.9 % SODIUM CHLORIDE 0.9 %
1000 INTRAVENOUS SOLUTION INTRAVENOUS ONCE
Status: COMPLETED | OUTPATIENT
Start: 2023-02-23 | End: 2023-02-23

## 2023-02-23 RX ORDER — ONDANSETRON 2 MG/ML
8 INJECTION INTRAMUSCULAR; INTRAVENOUS EVERY 6 HOURS PRN
Status: DISCONTINUED | OUTPATIENT
Start: 2023-02-23 | End: 2023-02-24 | Stop reason: HOSPADM

## 2023-02-23 RX ADMIN — SODIUM CHLORIDE, PRESERVATIVE FREE 10 ML: 5 INJECTION INTRAVENOUS at 17:00

## 2023-02-23 RX ADMIN — SODIUM CHLORIDE 1000 ML: 9 INJECTION, SOLUTION INTRAVENOUS at 17:00

## 2023-02-23 RX ADMIN — ONDANSETRON 8 MG: 2 INJECTION INTRAMUSCULAR; INTRAVENOUS at 17:10

## 2023-02-23 NOTE — TELEPHONE ENCOUNTER
Call from [facility] and the patient has a low BP and elevated HR. She has been vomiting. I reported to NP Scotty Amaro and we can get her an appt in Infusion for 1 L NS and IV zofran. I spoke with Wolf George and there is an appt at 4:30. Call to the patient and she will be here.     Orders placed in [address] [place] [address]

## 2023-02-23 NOTE — PROGRESS NOTES
Arrived to the Infusion Center.  IVF bolus and iv zofran completed. Patient tolerated well.   Any issues or concerns during appointment: no  Patient aware of next infusion appointment on 3/2/2023 (date) at 0930 (time).  Patient aware of next lab and [facility] office visit on 3/2/2023 (date) at 0815 (time).  Patient instructed to call provider with temperature of 100.4 or greater or nausea/vomiting/ diarrhea or pain not controlled by medications  Discharged via wheelchair.

## 2023-02-28 ENCOUNTER — HOSPITAL ENCOUNTER (EMERGENCY)
Age: 48
Discharge: HOME | DRG: 374 | End: 2023-02-28
Attending: EMERGENCY MEDICINE
Payer: COMMERCIAL

## 2023-02-28 VITALS
WEIGHT: 114 LBS | TEMPERATURE: 98.2 F | HEIGHT: 64 IN | SYSTOLIC BLOOD PRESSURE: 103 MMHG | RESPIRATION RATE: 17 BRPM | HEART RATE: 81 BPM | OXYGEN SATURATION: 98 % | BODY MASS INDEX: 19.46 KG/M2 | DIASTOLIC BLOOD PRESSURE: 72 MMHG

## 2023-02-28 DIAGNOSIS — R11.2 NAUSEA AND VOMITING, UNSPECIFIED VOMITING TYPE: Primary | ICD-10-CM

## 2023-02-28 LAB
ANION GAP SERPL CALC-SCNC: 5 MMOL/L (ref 2–11)
BASOPHILS # BLD: 0 K/UL (ref 0–0.2)
BASOPHILS NFR BLD: 0 % (ref 0–2)
BUN SERPL-MCNC: 20 MG/DL (ref 6–23)
CALCIUM SERPL-MCNC: 9.2 MG/DL (ref 8.3–10.4)
CHLORIDE SERPL-SCNC: 99 MMOL/L (ref 101–110)
CO2 SERPL-SCNC: 28 MMOL/L (ref 21–32)
CREAT SERPL-MCNC: 0.5 MG/DL (ref 0.6–1)
DIFFERENTIAL METHOD BLD: ABNORMAL
EOSINOPHIL # BLD: 0 K/UL (ref 0–0.8)
EOSINOPHIL NFR BLD: 0 % (ref 0.5–7.8)
ERYTHROCYTE [DISTWIDTH] IN BLOOD BY AUTOMATED COUNT: 21.8 % (ref 11.9–14.6)
GLUCOSE SERPL-MCNC: 100 MG/DL (ref 65–100)
HCT VFR BLD AUTO: 31.1 % (ref 35.8–46.3)
HGB BLD-MCNC: 9.3 G/DL (ref 11.7–15.4)
IMM GRANULOCYTES # BLD AUTO: 0 K/UL (ref 0–0.5)
IMM GRANULOCYTES NFR BLD AUTO: 0 % (ref 0–5)
LYMPHOCYTES # BLD: 1.1 K/UL (ref 0.5–4.6)
LYMPHOCYTES NFR BLD: 27 % (ref 13–44)
MAGNESIUM SERPL-MCNC: 1.9 MG/DL (ref 1.8–2.4)
MCH RBC QN AUTO: 25.8 PG (ref 26.1–32.9)
MCHC RBC AUTO-ENTMCNC: 29.9 G/DL (ref 31.4–35)
MCV RBC AUTO: 86.4 FL (ref 82–102)
MONOCYTES # BLD: 0.8 K/UL (ref 0.1–1.3)
MONOCYTES NFR BLD: 18 % (ref 4–12)
NEUTS SEG # BLD: 2.3 K/UL (ref 1.7–8.2)
NEUTS SEG NFR BLD: 55 % (ref 43–78)
NRBC # BLD: 0 K/UL (ref 0–0.2)
PLATELET # BLD AUTO: 391 K/UL (ref 150–450)
PMV BLD AUTO: 10.7 FL (ref 9.4–12.3)
POTASSIUM SERPL-SCNC: 4.3 MMOL/L (ref 3.5–5.1)
RBC # BLD AUTO: 3.6 M/UL (ref 4.05–5.2)
SODIUM SERPL-SCNC: 132 MMOL/L (ref 133–143)
WBC # BLD AUTO: 4.3 K/UL (ref 4.3–11.1)

## 2023-02-28 PROCEDURE — 96375 TX/PRO/DX INJ NEW DRUG ADDON: CPT | Performed by: EMERGENCY MEDICINE

## 2023-02-28 PROCEDURE — 6360000002 HC RX W HCPCS

## 2023-02-28 PROCEDURE — 99284 EMERGENCY DEPT VISIT MOD MDM: CPT | Performed by: EMERGENCY MEDICINE

## 2023-02-28 PROCEDURE — 2580000003 HC RX 258

## 2023-02-28 PROCEDURE — 83735 ASSAY OF MAGNESIUM: CPT

## 2023-02-28 PROCEDURE — 96374 THER/PROPH/DIAG INJ IV PUSH: CPT | Performed by: EMERGENCY MEDICINE

## 2023-02-28 PROCEDURE — 96361 HYDRATE IV INFUSION ADD-ON: CPT | Performed by: EMERGENCY MEDICINE

## 2023-02-28 PROCEDURE — 80048 BASIC METABOLIC PNL TOTAL CA: CPT

## 2023-02-28 PROCEDURE — 85025 COMPLETE CBC W/AUTO DIFF WBC: CPT

## 2023-02-28 RX ORDER — PROCHLORPERAZINE MALEATE 10 MG
10 TABLET ORAL EVERY 6 HOURS PRN
Qty: 20 TABLET | Refills: 0 | Status: SHIPPED | OUTPATIENT
Start: 2023-02-28 | End: 2023-02-28 | Stop reason: SDUPTHER

## 2023-02-28 RX ORDER — PROCHLORPERAZINE MALEATE 10 MG
10 TABLET ORAL EVERY 6 HOURS PRN
Qty: 20 TABLET | Refills: 0 | Status: SHIPPED | OUTPATIENT
Start: 2023-02-28

## 2023-02-28 RX ORDER — METOCLOPRAMIDE HYDROCHLORIDE 5 MG/ML
10 INJECTION INTRAMUSCULAR; INTRAVENOUS
Status: DISCONTINUED | OUTPATIENT
Start: 2023-02-28 | End: 2023-02-28 | Stop reason: HOSPADM

## 2023-02-28 RX ORDER — 0.9 % SODIUM CHLORIDE 0.9 %
1000 INTRAVENOUS SOLUTION INTRAVENOUS ONCE
Status: COMPLETED | OUTPATIENT
Start: 2023-02-28 | End: 2023-02-28

## 2023-02-28 RX ORDER — MORPHINE SULFATE 4 MG/ML
4 INJECTION INTRAVENOUS ONCE
Status: COMPLETED | OUTPATIENT
Start: 2023-02-28 | End: 2023-02-28

## 2023-02-28 RX ORDER — PROCHLORPERAZINE EDISYLATE 5 MG/ML
10 INJECTION INTRAMUSCULAR; INTRAVENOUS ONCE
Status: COMPLETED | OUTPATIENT
Start: 2023-02-28 | End: 2023-02-28

## 2023-02-28 RX ADMIN — PROCHLORPERAZINE EDISYLATE 10 MG: 5 INJECTION INTRAMUSCULAR; INTRAVENOUS at 18:20

## 2023-02-28 RX ADMIN — MORPHINE SULFATE 4 MG: 4 INJECTION INTRAVENOUS at 19:51

## 2023-02-28 RX ADMIN — SODIUM CHLORIDE 1000 ML: 9 INJECTION, SOLUTION INTRAVENOUS at 18:05

## 2023-02-28 ASSESSMENT — PAIN - FUNCTIONAL ASSESSMENT: PAIN_FUNCTIONAL_ASSESSMENT: 0-10

## 2023-02-28 ASSESSMENT — PAIN DESCRIPTION - LOCATION
LOCATION: ABDOMEN
LOCATION: ABDOMEN

## 2023-02-28 ASSESSMENT — PAIN SCALES - GENERAL
PAINLEVEL_OUTOF10: 8
PAINLEVEL_OUTOF10: 8

## 2023-02-28 ASSESSMENT — PAIN DESCRIPTION - PAIN TYPE: TYPE: ACUTE PAIN

## 2023-02-28 ASSESSMENT — PAIN DESCRIPTION - FREQUENCY: FREQUENCY: CONTINUOUS

## 2023-02-28 ASSESSMENT — PAIN DESCRIPTION - DESCRIPTORS: DESCRIPTORS: ACHING;DISCOMFORT;SHARP

## 2023-02-28 NOTE — ED TRIAGE NOTES
Pt c/o worsened weakness and emesis since last week (+)abd pain   Pt on TPN, receiving chemo for colon cancer, takes zofran   A&Ox4

## 2023-02-28 NOTE — ED PROVIDER NOTES
[employer] Emergency Department Provider Note                   PCP:                On File Not (Inactive)               Age: 50 y.o. Sex: female       ICD-10-CM    1. Nausea and vomiting, unspecified vomiting type  R11.2           DISPOSITION Decision To Discharge 02/28/2023 08:30:19 PM         Orders Placed This Encounter   Procedures    BMP    CBC with Auto Differential    Magnesium        Robert Napier is a 50 y.o. female who presents to the Emergency Department with chief complaint of    Chief Complaint   Patient presents with    Emesis    Fatigue      80-year-old female presenting to the emergency department chief complaint of 1 week history of nausea and vomiting. She is also complaining of some epigastric pain. States she is currently receiving chemotherapy treatment for colorectal cancer and experiencing breakthrough nausea symptoms 1 week status post last chemotherapy infusion. States she has been taking Zofran and Reglan at home which have provided minimal improvement in her nausea symptoms. The history is provided by the patient.        Review of Systems    Past Medical History:   Diagnosis Date    Alteration in nutrition     per RD note TPN dependent 12 hours per day- [facility]    Anemia     Colon cancer ([address] dx 2/2022    followed by dr Hilton Loomis    COVID-19 12/2020    no hospitalization    GERD (gastroesophageal reflux disease)     managed with med    History of blood transfusion 2022    History of colonoscopy 05/2018    Dr. Mayi Figueroa, nl (see media note), R 2028    Hx of blood clots 06/2022    per pt \"small clot on lung identified by CT scan\"  CT Scan impression:- Nonobstructive pulmonary filling defect involving Left Lower Lobe     Hypotension     asymptomatic    Obstruction of fallopian tube     per pt has \"1 good tube\"    Peritoneal carcinomatosis ([address]     chemo; abdominal pleurx    Weight loss     80lbs weight loss after gastric sleeve        Past Surgical History:   Procedure Laterality Date     SECTION  2009    CYSTOSCOPY Bilateral 2022    CYSTOSCOPY BILATERAL RETROGRADE PYELOGRAM performed by Rachel Beyer MD at [address] Bilateral 2022    BILATERAL CYSTOSCOPY URETERAL STENT EXCHANGE performed by Rachel Beyer MD at [address] Bilateral 10/6/2022    CYSTOSCOPY BILATERAL URETERAL STENT REMOVAL, RIGHT URETERAL STENT PLACEMENT performed by Rachel Beyer MD at [facility] Bilateral 2023    CYSTOSCOPY / BILATERAL RETROGRADE / RIGHT URETERAL STENT EXCHANGE performed by Rachel Beyer MD at [facility] MAIN OR    GASTRIC BYPASS SURGERY  2014    gastric sleeve- [facility]    HYSTERECTOMY (CERVIX STATUS UNKNOWN)      2018    HYSTERECTOMY (CERVIX STATUS UNKNOWN)  2020    TLH w/ Bilateral salpingectomy and left oophorectomy    IR PERC CATH PLEURAL DRAIN W/IMAG  2022    IR PERC CATH PLEURAL DRAIN W/IMAG 2022 [facility] SPECI[facility]    IR PORT PLACEMENT EQUAL OR GREATER THAN 5 YEARS  2022    IR PORT PLACEMENT EQUAL OR GREATER THAN 5 YEARS  2022    IR PORT PLACEMENT EQUAL OR GREATER THAN 5 YEARS 2022 [facility] SPECI[facility]    LAPAROTOMY N/A 2022    EXPLORATORY LAPAROTOMY/ ENTEROENTEROSTOMY performed by Osbaldo Crocker MD at [facility]. [address]  age [place] 21s\"    also \"unblocked her FT\"    TONSILLECTOMY      UPPER GASTROINTESTINAL ENDOSCOPY      with dilation    UPPER GASTROINTESTINAL ENDOSCOPY N/A 2022    EGD ESOPHAGOGASTRODUODENOSCOPY OFL 17 To IR after recovery for Para performed by Jeffery Clark MD at [address] 10/21/2022    EGD DILATION BALLOON performed by Anton Crigler, MD at [address] Bilateral 03/15/2022    cysto        Family History   Problem Relation Age of Onset    Heart Disease Maternal Grandmother     Hypertension Maternal Grandmother     Heart Disease Maternal Grandfather Hypertension Maternal Grandfather     Pulmonary Embolism Neg Hx     Colon Cancer Neg Hx     Deep Vein Thrombosis Neg Hx     Ovarian Cancer Neg Hx     Prostate Cancer Neg Hx     Breast Cancer Neg Hx         Social History     Socioeconomic History    Marital status: Single     Spouse name: None    Number of children: None    Years of education: None    Highest education level: None   Tobacco Use    Smoking status: Never    Smokeless tobacco: Never   Vaping Use    Vaping Use: Never used   Substance and Sexual Activity    Alcohol use: Not Currently    Drug use: No   Social History Narrative    1. Use large speculum. 2.  sister, Shannon Ray #[id number] and mom is Jung Lazo #[id number] (both Kofoed's pts)  3. PCP  Dr. Barrie Primrose ([facility]), GI Dr. Richard Bello-2018 normal EGD         Patient has no known allergies. Discharge Medication List as of 2/28/2023  8:06 PM        CONTINUE these medications which have NOT CHANGED    Details   vitamin D (ERGOCALCIFEROL) 1.25 MG (07515 UT) CAPS capsule Take 1 capsule by mouth every 7 days, Disp-8 capsule, R-0Normal      pantoprazole (PROTONIX) 40 MG tablet Take 1 tablet by mouth in the morning and at bedtime, Disp-60 tablet, R-0Normal      metoclopramide (REGLAN) 5 MG tablet Take 1 tablet by mouth 3 times daily, Disp-90 tablet, R-0Normal      lidocaine-prilocaine (EMLA) 2.5-2.5 % cream PRN  Apply to port about 45 minutes prior to access, Historical Med      ondansetron (ZOFRAN) 8 MG tablet Take 8 mg by mouth every 8 hours as neededHistorical Med              Vitals signs and nursing note reviewed. No data found. Physical Exam  Constitutional:       General: She is not in acute distress. Appearance: Normal appearance. She is ill-appearing. HENT:      Head: Normocephalic and atraumatic. Eyes:      Extraocular Movements: Extraocular movements intact. Conjunctiva/sclera: Conjunctivae normal.      Pupils: Pupils are equal, round, and reactive to light. Abdominal:      Tenderness: There is abdominal tenderness. Comments: There is some very mild epigastric abdominal tenderness on exam   Skin:     General: Skin is warm and dry. Neurological:      Mental Status: She is alert. MDM  Number of Diagnoses or Management Options  Nausea and vomiting, unspecified vomiting type  Diagnosis management comments: This patient initially presented to the emergency department with chief complaint of 1 week history of nausea and vomiting. She is currently undergoing chemotherapy however states she had a week off of chemo last week. Denies associated diarrhea. Very mild hyponatremia 132.  1 L normal saline bolus administered in ED course. Hemoglobin low 9.3 however this is above her baseline level. Magnesium within normal limits. Clinical suspicion this patient has developed acute gastroenteritis versus breakthrough chemotherapy-induced nausea and vomiting. Nausea symptoms improved with Compazine in ED. Prescribed outpatient course of as needed Compazine and advised to increase clear fluid intake. Discharged with return precautions. I independently ordered and reviewed the labs            Patient was discharged risks and benefits of hospitalization were considered.          Amount and/or Complexity of Data Reviewed  Clinical lab tests: ordered and reviewed    Patient Progress  Patient progress: stable      Procedures      Labs Reviewed   BASIC METABOLIC PANEL - Abnormal; Notable for the following components:       Result Value    Sodium 132 (*)     Chloride 99 (*)     Creatinine 0.50 (*)     All other components within normal limits   CBC WITH AUTO DIFFERENTIAL - Abnormal; Notable for the following components:    RBC 3.60 (*)     Hemoglobin 9.3 (*)     Hematocrit 31.1 (*)     MCH 25.8 (*)     MCHC 29.9 (*)     RDW 21.8 (*)     Monocytes 18 (*)     Eosinophils % 0 (*)     All other components within normal limits   MAGNESIUM        No orders to display Voice dictation software was used during the making of this note. This software is not perfect and grammatical and other typographical errors may be present. This note has not been completely proofread for errors.      ODALYS Dave  03/01/23 3411

## 2023-03-01 RX ORDER — SODIUM CHLORIDE 0.9 % (FLUSH) 0.9 %
10 SYRINGE (ML) INJECTION PRN
Status: DISCONTINUED | OUTPATIENT
Start: 2023-03-01 | End: 2023-03-01 | Stop reason: HOSPADM

## 2023-03-01 NOTE — DISCHARGE INSTRUCTIONS
There are no worrisome findings on your lab work today. Take Compazine as needed for nausea relief. Make sure to drink plenty of clear fluids to stay hydrated.

## 2023-03-01 NOTE — ED NOTES
I have reviewed discharge instructions with the patient. The patient verbalized understanding. Patient left ED via Discharge Method: wheelchair to Home with family. Opportunity for questions and clarification provided. Patient given 1 scripts. To continue your aftercare when you leave the hospital, you may receive an automated call from our care team to check in on how you are doing. This is a free service and part of our promise to provide the best care and service to meet your aftercare needs.  If you have questions, or wish to unsubscribe from this service please call [phone]. Thank you for Choosing our [facility] Emergency Department.         Jes Pereira RN  02/28/23 1568

## 2023-03-02 ENCOUNTER — HOSPITAL ENCOUNTER (INPATIENT)
Age: 48
LOS: 5 days | Discharge: HOME | DRG: 374 | End: 2023-03-07
Attending: INTERNAL MEDICINE | Admitting: INTERNAL MEDICINE
Payer: COMMERCIAL

## 2023-03-02 ENCOUNTER — OFFICE VISIT (OUTPATIENT)
Dept: ONCOLOGY | Age: 48
End: 2023-03-02
Payer: COMMERCIAL

## 2023-03-02 ENCOUNTER — CLINICAL DOCUMENTATION (OUTPATIENT)
Dept: CASE MANAGEMENT | Age: 48
End: 2023-03-02

## 2023-03-02 ENCOUNTER — APPOINTMENT (OUTPATIENT)
Dept: CT IMAGING | Age: 48
DRG: 374 | End: 2023-03-02
Attending: INTERNAL MEDICINE
Payer: COMMERCIAL

## 2023-03-02 ENCOUNTER — HOSPITAL ENCOUNTER (OUTPATIENT)
Dept: INFUSION THERAPY | Age: 48
Discharge: HOME | End: 2023-03-02
Payer: COMMERCIAL

## 2023-03-02 ENCOUNTER — OFFICE VISIT (OUTPATIENT)
Dept: ONCOLOGY | Age: 48
End: 2023-03-02

## 2023-03-02 VITALS
RESPIRATION RATE: 12 BRPM | OXYGEN SATURATION: 98 % | HEART RATE: 94 BPM | WEIGHT: 117.6 LBS | HEIGHT: 64 IN | SYSTOLIC BLOOD PRESSURE: 123 MMHG | BODY MASS INDEX: 20.08 KG/M2 | TEMPERATURE: 98.7 F | DIASTOLIC BLOOD PRESSURE: 83 MMHG

## 2023-03-02 DIAGNOSIS — C80.1 METASTASIS TO PERITONEUM OF UNKNOWN PRIMARY (HCC): ICD-10-CM

## 2023-03-02 DIAGNOSIS — C80.1 CARCINOMA OF UNKNOWN PRIMARY (HCC): Primary | ICD-10-CM

## 2023-03-02 DIAGNOSIS — Z78.9 ON TOTAL PARENTERAL NUTRITION (TPN): ICD-10-CM

## 2023-03-02 DIAGNOSIS — C78.6 METASTASIS TO PERITONEUM OF UNKNOWN PRIMARY (HCC): ICD-10-CM

## 2023-03-02 DIAGNOSIS — Z00.8 NUTRITIONAL ASSESSMENT: Primary | ICD-10-CM

## 2023-03-02 DIAGNOSIS — R11.2 CINV (CHEMOTHERAPY-INDUCED NAUSEA AND VOMITING): Primary | ICD-10-CM

## 2023-03-02 DIAGNOSIS — C80.1 CARCINOMA OF UNKNOWN PRIMARY (HCC): ICD-10-CM

## 2023-03-02 DIAGNOSIS — T45.1X5A CINV (CHEMOTHERAPY-INDUCED NAUSEA AND VOMITING): Primary | ICD-10-CM

## 2023-03-02 LAB
ALBUMIN SERPL-MCNC: 2.8 G/DL (ref 3.5–5)
ALBUMIN/GLOB SERPL: 0.8 (ref 0.4–1.6)
ALP SERPL-CCNC: 92 U/L (ref 50–136)
ALT SERPL-CCNC: 18 U/L (ref 12–65)
ANION GAP SERPL CALC-SCNC: 8 MMOL/L (ref 2–11)
APPEARANCE UR: CLEAR
AST SERPL-CCNC: 14 U/L (ref 15–37)
BACTERIA URNS QL MICRO: NEGATIVE /HPF
BASOPHILS # BLD: 0 K/UL (ref 0–0.2)
BASOPHILS NFR BLD: 0 % (ref 0–2)
BILIRUB SERPL-MCNC: 0.3 MG/DL (ref 0.2–1.1)
BILIRUB UR QL: NEGATIVE
BUN SERPL-MCNC: 23 MG/DL (ref 6–23)
CALCIUM SERPL-MCNC: 8.7 MG/DL (ref 8.3–10.4)
CASTS URNS QL MICRO: ABNORMAL /LPF
CEA SERPL-MCNC: 2.8 NG/ML (ref 0–3)
CHLORIDE SERPL-SCNC: 99 MMOL/L (ref 101–110)
CO2 SERPL-SCNC: 27 MMOL/L (ref 21–32)
COLOR UR: ABNORMAL
CREAT SERPL-MCNC: 0.4 MG/DL (ref 0.6–1)
DIFFERENTIAL METHOD BLD: ABNORMAL
EOSINOPHIL # BLD: 0 K/UL (ref 0–0.8)
EOSINOPHIL NFR BLD: 0 % (ref 0.5–7.8)
EPI CELLS #/AREA URNS HPF: ABNORMAL /HPF
ERYTHROCYTE [DISTWIDTH] IN BLOOD BY AUTOMATED COUNT: 21.4 % (ref 11.9–14.6)
GLOBULIN SER CALC-MCNC: 3.7 G/DL (ref 2.8–4.5)
GLUCOSE SERPL-MCNC: 105 MG/DL (ref 65–100)
GLUCOSE UR STRIP.AUTO-MCNC: NEGATIVE MG/DL
HCT VFR BLD AUTO: 26.8 % (ref 35.8–46.3)
HGB BLD-MCNC: 7.9 G/DL (ref 11.7–15.4)
HGB UR QL STRIP: ABNORMAL
IMM GRANULOCYTES # BLD AUTO: 0 K/UL (ref 0–0.5)
IMM GRANULOCYTES NFR BLD AUTO: 0 % (ref 0–5)
KETONES UR QL STRIP.AUTO: NEGATIVE MG/DL
LEUKOCYTE ESTERASE UR QL STRIP.AUTO: ABNORMAL
LYMPHOCYTES # BLD: 1 K/UL (ref 0.5–4.6)
LYMPHOCYTES NFR BLD: 19 % (ref 13–44)
MAGNESIUM SERPL-MCNC: 2 MG/DL (ref 1.8–2.4)
MCH RBC QN AUTO: 25.2 PG (ref 26.1–32.9)
MCHC RBC AUTO-ENTMCNC: 29.5 G/DL (ref 31.4–35)
MCV RBC AUTO: 85.4 FL (ref 82–102)
MONOCYTES # BLD: 0.8 K/UL (ref 0.1–1.3)
MONOCYTES NFR BLD: 15 % (ref 4–12)
NEUTS SEG # BLD: 3.3 K/UL (ref 1.7–8.2)
NEUTS SEG NFR BLD: 65 % (ref 43–78)
NITRITE UR QL STRIP.AUTO: NEGATIVE
NRBC # BLD: 0 K/UL (ref 0–0.2)
PH UR STRIP: 7 (ref 5–9)
PLATELET # BLD AUTO: 339 K/UL (ref 150–450)
PMV BLD AUTO: 9.5 FL (ref 9.4–12.3)
POTASSIUM SERPL-SCNC: 4.2 MMOL/L (ref 3.5–5.1)
PROT SERPL-MCNC: 6.5 G/DL (ref 6.3–8.2)
PROT UR STRIP-MCNC: 30 MG/DL
PROT UR-MCNC: 96 MG/DL
RBC # BLD AUTO: 3.14 M/UL (ref 4.05–5.2)
RBC #/AREA URNS HPF: >100 /HPF
SODIUM SERPL-SCNC: 134 MMOL/L (ref 133–143)
SP GR UR REFRACTOMETRY: >1.035 (ref 1–1.02)
UROBILINOGEN UR QL STRIP.AUTO: 1 EU/DL (ref 0.2–1)
WBC # BLD AUTO: 5.1 K/UL (ref 4.3–11.1)
WBC URNS QL MICRO: ABNORMAL /HPF

## 2023-03-02 PROCEDURE — 83735 ASSAY OF MAGNESIUM: CPT

## 2023-03-02 PROCEDURE — 1100000000 HC RM PRIVATE

## 2023-03-02 PROCEDURE — C9113 INJ PANTOPRAZOLE SODIUM, VIA: HCPCS | Performed by: NURSE PRACTITIONER

## 2023-03-02 PROCEDURE — 2500000003 HC RX 250 WO HCPCS: Performed by: INTERNAL MEDICINE

## 2023-03-02 PROCEDURE — 6360000002 HC RX W HCPCS: Performed by: INTERNAL MEDICINE

## 2023-03-02 PROCEDURE — A4216 STERILE WATER/SALINE, 10 ML: HCPCS | Performed by: NURSE PRACTITIONER

## 2023-03-02 PROCEDURE — 96375 TX/PRO/DX INJ NEW DRUG ADDON: CPT

## 2023-03-02 PROCEDURE — 81001 URINALYSIS AUTO W/SCOPE: CPT

## 2023-03-02 PROCEDURE — 6360000004 HC RX CONTRAST MEDICATION: Performed by: NURSE PRACTITIONER

## 2023-03-02 PROCEDURE — 80053 COMPREHEN METABOLIC PANEL: CPT

## 2023-03-02 PROCEDURE — 2580000003 HC RX 258: Performed by: INTERNAL MEDICINE

## 2023-03-02 PROCEDURE — 74177 CT ABD & PELVIS W/CONTRAST: CPT

## 2023-03-02 PROCEDURE — 3E0336Z INTRODUCTION OF NUTRITIONAL SUBSTANCE INTO PERIPHERAL VEIN, PERCUTANEOUS APPROACH: ICD-10-PCS | Performed by: INTERNAL MEDICINE

## 2023-03-02 PROCEDURE — 2580000003 HC RX 258: Performed by: NURSE PRACTITIONER

## 2023-03-02 PROCEDURE — 6360000002 HC RX W HCPCS: Performed by: NURSE PRACTITIONER

## 2023-03-02 PROCEDURE — 84156 ASSAY OF PROTEIN URINE: CPT

## 2023-03-02 PROCEDURE — 85025 COMPLETE CBC W/AUTO DIFF WBC: CPT

## 2023-03-02 PROCEDURE — 96361 HYDRATE IV INFUSION ADD-ON: CPT

## 2023-03-02 PROCEDURE — 82378 CARCINOEMBRYONIC ANTIGEN: CPT

## 2023-03-02 PROCEDURE — 96365 THER/PROPH/DIAG IV INF INIT: CPT

## 2023-03-02 PROCEDURE — 36591 DRAW BLOOD OFF VENOUS DEVICE: CPT

## 2023-03-02 PROCEDURE — 99221 1ST HOSP IP/OBS SF/LOW 40: CPT | Performed by: INTERNAL MEDICINE

## 2023-03-02 RX ORDER — MORPHINE SULFATE 2 MG/ML
2 INJECTION, SOLUTION INTRAMUSCULAR; INTRAVENOUS EVERY 4 HOURS PRN
Status: DISCONTINUED | OUTPATIENT
Start: 2023-03-02 | End: 2023-03-07 | Stop reason: HOSPADM

## 2023-03-02 RX ORDER — HYDROCODONE BITARTRATE AND ACETAMINOPHEN 5; 325 MG/1; MG/1
1 TABLET ORAL EVERY 6 HOURS PRN
Status: DISCONTINUED | OUTPATIENT
Start: 2023-03-02 | End: 2023-03-07 | Stop reason: HOSPADM

## 2023-03-02 RX ORDER — SODIUM CHLORIDE 9 MG/ML
INJECTION, SOLUTION INTRAVENOUS CONTINUOUS
Status: ACTIVE | OUTPATIENT
Start: 2023-03-02 | End: 2023-03-02

## 2023-03-02 RX ORDER — POTASSIUM CHLORIDE 29.8 MG/ML
20 INJECTION INTRAVENOUS PRN
Status: DISCONTINUED | OUTPATIENT
Start: 2023-03-02 | End: 2023-03-07 | Stop reason: HOSPADM

## 2023-03-02 RX ORDER — POTASSIUM CHLORIDE 7.45 MG/ML
10 INJECTION INTRAVENOUS PRN
Status: DISCONTINUED | OUTPATIENT
Start: 2023-03-02 | End: 2023-03-07 | Stop reason: HOSPADM

## 2023-03-02 RX ORDER — ACETAMINOPHEN 325 MG/1
650 TABLET ORAL EVERY 6 HOURS PRN
Status: DISCONTINUED | OUTPATIENT
Start: 2023-03-02 | End: 2023-03-07 | Stop reason: HOSPADM

## 2023-03-02 RX ORDER — ONDANSETRON 2 MG/ML
4 INJECTION INTRAMUSCULAR; INTRAVENOUS EVERY 4 HOURS PRN
Status: DISCONTINUED | OUTPATIENT
Start: 2023-03-02 | End: 2023-03-07 | Stop reason: HOSPADM

## 2023-03-02 RX ORDER — LIDOCAINE AND PRILOCAINE 25; 25 MG/G; MG/G
CREAM TOPICAL PRN
Status: DISCONTINUED | OUTPATIENT
Start: 2023-03-02 | End: 2023-03-07 | Stop reason: HOSPADM

## 2023-03-02 RX ORDER — MAGNESIUM SULFATE IN WATER 40 MG/ML
2000 INJECTION, SOLUTION INTRAVENOUS PRN
Status: DISCONTINUED | OUTPATIENT
Start: 2023-03-02 | End: 2023-03-07 | Stop reason: HOSPADM

## 2023-03-02 RX ORDER — DIPHENHYDRAMINE HYDROCHLORIDE 50 MG/ML
25 INJECTION INTRAMUSCULAR; INTRAVENOUS ONCE
Status: COMPLETED | OUTPATIENT
Start: 2023-03-02 | End: 2023-03-02

## 2023-03-02 RX ORDER — 0.9 % SODIUM CHLORIDE 0.9 %
100 INTRAVENOUS SOLUTION INTRAVENOUS ONCE
Status: COMPLETED | OUTPATIENT
Start: 2023-03-02 | End: 2023-03-02

## 2023-03-02 RX ORDER — SODIUM CHLORIDE 0.9 % (FLUSH) 0.9 %
10 SYRINGE (ML) INJECTION PRN
Status: DISCONTINUED | OUTPATIENT
Start: 2023-03-02 | End: 2023-03-03 | Stop reason: HOSPADM

## 2023-03-02 RX ORDER — SODIUM CHLORIDE 0.9 % (FLUSH) 0.9 %
5-40 SYRINGE (ML) INJECTION PRN
Status: DISCONTINUED | OUTPATIENT
Start: 2023-03-02 | End: 2023-03-03 | Stop reason: HOSPADM

## 2023-03-02 RX ORDER — SODIUM CHLORIDE 0.9 % (FLUSH) 0.9 %
10 SYRINGE (ML) INJECTION
Status: COMPLETED | OUTPATIENT
Start: 2023-03-02 | End: 2023-03-02

## 2023-03-02 RX ORDER — POLYETHYLENE GLYCOL 3350 17 G/17G
17 POWDER, FOR SOLUTION ORAL DAILY PRN
Status: DISCONTINUED | OUTPATIENT
Start: 2023-03-02 | End: 2023-03-07 | Stop reason: HOSPADM

## 2023-03-02 RX ORDER — SODIUM CHLORIDE 9 MG/ML
INJECTION, SOLUTION INTRAVENOUS ONCE
Status: CANCELLED
Start: 2023-03-02 | End: 2023-03-02

## 2023-03-02 RX ORDER — PROCHLORPERAZINE EDISYLATE 5 MG/ML
10 INJECTION INTRAMUSCULAR; INTRAVENOUS EVERY 6 HOURS PRN
Status: DISCONTINUED | OUTPATIENT
Start: 2023-03-02 | End: 2023-03-07 | Stop reason: HOSPADM

## 2023-03-02 RX ORDER — ENOXAPARIN SODIUM 100 MG/ML
40 INJECTION SUBCUTANEOUS DAILY
Status: DISCONTINUED | OUTPATIENT
Start: 2023-03-02 | End: 2023-03-07 | Stop reason: HOSPADM

## 2023-03-02 RX ORDER — SODIUM CHLORIDE 9 MG/ML
INJECTION, SOLUTION INTRAVENOUS ONCE
Status: COMPLETED | OUTPATIENT
Start: 2023-03-02 | End: 2023-03-02

## 2023-03-02 RX ADMIN — DIPHENHYDRAMINE HYDROCHLORIDE 25 MG: 50 INJECTION, SOLUTION INTRAMUSCULAR; INTRAVENOUS at 10:08

## 2023-03-02 RX ADMIN — MORPHINE SULFATE 2 MG: 2 INJECTION, SOLUTION INTRAMUSCULAR; INTRAVENOUS at 20:01

## 2023-03-02 RX ADMIN — SODIUM CHLORIDE, PRESERVATIVE FREE 40 MG: 5 INJECTION INTRAVENOUS at 15:02

## 2023-03-02 RX ADMIN — SODIUM CHLORIDE, PRESERVATIVE FREE 10 ML: 5 INJECTION INTRAVENOUS at 08:33

## 2023-03-02 RX ADMIN — SODIUM CHLORIDE, PRESERVATIVE FREE 10 ML: 5 INJECTION INTRAVENOUS at 11:45

## 2023-03-02 RX ADMIN — SODIUM CHLORIDE: 9 INJECTION, SOLUTION INTRAVENOUS at 13:36

## 2023-03-02 RX ADMIN — ONDANSETRON 4 MG: 2 INJECTION INTRAMUSCULAR; INTRAVENOUS at 20:01

## 2023-03-02 RX ADMIN — ONDANSETRON 4 MG: 2 INJECTION INTRAMUSCULAR; INTRAVENOUS at 13:15

## 2023-03-02 RX ADMIN — DIATRIZOATE MEGLUMINE AND DIATRIZOATE SODIUM 15 ML: 660; 100 LIQUID ORAL; RECTAL at 15:02

## 2023-03-02 RX ADMIN — ENOXAPARIN SODIUM 40 MG: 100 INJECTION SUBCUTANEOUS at 18:00

## 2023-03-02 RX ADMIN — IOPAMIDOL 100 ML: 755 INJECTION, SOLUTION INTRAVENOUS at 17:09

## 2023-03-02 RX ADMIN — SODIUM CHLORIDE, PRESERVATIVE FREE 40 MG: 5 INJECTION INTRAVENOUS at 20:01

## 2023-03-02 RX ADMIN — SODIUM CHLORIDE, PRESERVATIVE FREE 10 ML: 5 INJECTION INTRAVENOUS at 17:09

## 2023-03-02 RX ADMIN — SODIUM CHLORIDE 100 ML: 9 INJECTION, SOLUTION INTRAVENOUS at 17:09

## 2023-03-02 RX ADMIN — SODIUM CHLORIDE: 9 INJECTION, SOLUTION INTRAVENOUS at 10:36

## 2023-03-02 RX ADMIN — POTASSIUM PHOSPHATE, MONOBASIC POTASSIUM PHOSPHATE, DIBASIC: 224; 236 INJECTION, SOLUTION, CONCENTRATE INTRAVENOUS at 18:00

## 2023-03-02 RX ADMIN — MORPHINE SULFATE 2 MG: 2 INJECTION, SOLUTION INTRAMUSCULAR; INTRAVENOUS at 13:15

## 2023-03-02 RX ADMIN — FOSAPREPITANT DIMEGLUMINE 150 MG: 150 INJECTION, POWDER, LYOPHILIZED, FOR SOLUTION INTRAVENOUS at 10:12

## 2023-03-02 ASSESSMENT — PAIN SCALES - GENERAL
PAINLEVEL_OUTOF10: 7
PAINLEVEL_OUTOF10: 8
PAINLEVEL_OUTOF10: 0

## 2023-03-02 ASSESSMENT — PAIN - FUNCTIONAL ASSESSMENT: PAIN_FUNCTIONAL_ASSESSMENT: ACTIVITIES ARE NOT PREVENTED

## 2023-03-02 ASSESSMENT — PAIN DESCRIPTION - LOCATION
LOCATION: ABDOMEN
LOCATION: ABDOMEN

## 2023-03-02 ASSESSMENT — PAIN DESCRIPTION - DESCRIPTORS: DESCRIPTORS: ACHING

## 2023-03-02 ASSESSMENT — PAIN DESCRIPTION - FREQUENCY: FREQUENCY: CONTINUOUS

## 2023-03-02 ASSESSMENT — PAIN DESCRIPTION - ONSET: ONSET: GRADUAL

## 2023-03-02 ASSESSMENT — PAIN DESCRIPTION - ORIENTATION: ORIENTATION: MID

## 2023-03-02 ASSESSMENT — PAIN DESCRIPTION - PAIN TYPE: TYPE: ACUTE PAIN

## 2023-03-02 NOTE — PROGRESS NOTES
TRANSFER - OUT REPORT:    Verbal report given to [address] [place] [address] on [employer]  being transferred to [facility] for routine progression of patient care       Report consisted of patient's Situation, Background, Assessment and   Recommendations(SBAR). Information from the following report(s) Nurse Handoff Report, MAR, and Recent Results was reviewed with the receiving nurse. [facility] Assessment: No data recorded  Lines:   Single Lumen Implantable Port Right Subclavian (Active)   Port A Cath Status Accessed 03/02/23 1004   Criteria for Appropriate Use Long term IV/antibiotic administration 03/02/23 1004   Site Assessment Clean, dry & intact 03/02/23 1004   Line Care Connections checked and tightened 03/02/23 1004   Alcohol Cap Used Yes 02/23/23 1700   Date of Last Dressing Change 03/02/23 03/02/23 1004   Dressing Type Transparent; Antimicrobial 03/02/23 1004   Dressing Status Clean, dry & intact 03/02/23 1145   Dressing Intervention New 03/02/23 1004   Date Accessed  03/02/23 03/02/23 1004   Access Attempts  1 03/02/23 1004   Access Needle Gauge 20 G 03/02/23 1004   Access Needle Length 0.75 inches 03/02/23 1004   Accessed By: LUIS M GARZA  [facility] RN 03/02/23 1004   Single Lumen Status Flushed 03/02/23 1145   Date needle changed 02/23/23 02/23/23 1700   De-Access Date 02/09/23 02/09/23 0822   De-Access Time 0822 02/09/23 0822   De-Accessed By SHREYAS RN 02/09/23 0822        Opportunity for questions and clarification was provided.       Patient transported via [facility] with family

## 2023-03-02 NOTE — CARE COORDINATION
Case Management Assessment  Initial Evaluation    Date/Time of Evaluation: 3/2/2023 3:03 PM  Assessment Completed by: Gerhardt Rands, MSW    If patient is discharged prior to next notation, then this note serves as note for discharge by case management. Patient Name: Jonel Anne                   YOB: 1975  Diagnosis: Intractable nausea and vomiting [R11.2]                   Date / Time: 3/2/2023 12:30 PM    Patient Admission Status: Inpatient   Readmission Risk (Low < 19, Mod (19-27), High > 27): Readmission Risk Score: 29.9    Current PCP: On File Not (Inactive)  PCP verified by CM? No (No PCP - declines referral)    Chart Reviewed: Yes      History Provided by: Patient, Medical Record  Patient Orientation: Alert and Oriented, Person, Place, Self    Patient Cognition: Alert    Hospitalization in the last 30 days (Readmission):  No    If yes, Readmission Assessment in CM Navigator will be completed. Advance Directives:      Code Status: Full Code   Patient's Primary Decision Maker is: Legal Next of Kin    [facility] [phone]    Discharge Planning:    Patient lives with: (P) Family Members Type of Home: (P) House  Primary Care Giver: Self  Patient Support Systems include: Parent   Current Financial resources: Other (Comment) (BCBS)  Current community resources: None  Current services prior to admission: (P) Durable Medical Equipment            Current DME: (P) Enteral Feedings            Type of Home Care services:  (P) Nursing Services    ADLS  Prior functional level: Independent in ADLs/IADLs  Current functional level: Independent in ADLs/IADLs    PT AM-PAC:   /24  OT AM-PAC:   /24    Family can provide assistance at DC: Yes  Would you like Case Management to discuss the discharge plan with any other family members/significant others, and if so, who?  No  Plans to Return to Present Housing: Yes  Other Identified Issues/Barriers to RETURNING to current housing: None  Potential Assistance needed at discharge: (P) N/A            Potential DME:  None  Patient expects to discharge to: (P) [facility] for transportation at discharge: (P) Family    Financial    Payor: Joshua Theodore / Plan: Queta Munguia / Product Type: *No Product type* /     Does insurance require precert for SNF: Yes    Potential assistance Purchasing Medications: (P) No      [id number] [facility] [address] [place] [address]  Phone: [phone] Fax: [phone]      Notes:    Factors facilitating achievement of predicted outcomes: Family support, Motivated, Cooperative, Pleasant, Has needed Durable Medical Equipment at home, and Current with [facility]    Barriers to discharge: Pain, Decreased endurance, and Medical complications    Additional Case Management Notes: Pleasant 51 y/o female pt who is known to the floor from previous admissions. Pt has insurance with pharmacy benefits. No PCP and declines a referral.  Has home TPN through [facility]. CM sent a referral to [facility] so they can follow pt while she is IP. Will need [facility] order for home TPN at d/c. Pt is current with [facility]: SN and will resume at d/c. Pt admitted with Dx of intractable n/v.  Pt c/o abdominal pain and weakness. Current d/c plan is for pt to return home with resumption of [facility]: SN and home TPN at d/c. CM will continue to follow and remain available if any needs arise. The Plan for Transition of Care is related to the following treatment goals of Intractable nausea and vomiting [Y95.2]    IF APPLICABLE: The Patient and/or patient representative Evaristo Matos and her family were provided with a choice of provider and agrees with the discharge plan.  Freedom of choice list with basic dialogue that supports the patient's individualized plan of care/goals and shares the quality data associated with the providers was provided to: (P) Patient   Patient Representative Name:       The Patient and/or Patient Representative Agree with the Discharge Plan?       ZAY Castillo  Case Management Department

## 2023-03-02 NOTE — PROGRESS NOTES
[facility] Hematology and Oncology: Office Visit Established Patient    Reason for follow up:    Rosalind Dominguez  is seen in follow-up for carcinoma of unknown primary. Overview: (copied from prior)  Ms. Walker was seen for the first time in our office in February 2022. She was a woman of previously good health who began noticing left-sided back discomfort in early January 2022. This was associated  ultimately with a sense of difficulty swallowing and ultimately she presented to [facility] for evaluation. Her symptoms were felt to potentially be indicative of a bowel obstruction and she was sent for a CT scan. This study, performed on January 27, 2022  was notable for a linear calcific density along the course of the proximal left ureter with associated moderate hydronephrosis potentially indicative of an intraureteral calculus. There was also stranding throughout the retroperitoneal soft tissues anterior  to the left psoas muscle with pelvic ascites. There was also wall thickening involving the descending colon. The question of colitis or serosal implants was raised. The patient had undergone a gastric sleeve placement several years prior. She had  also undergone a negative colonoscopy about 3 years prior to these presentations. There was a history of anemia ultimately resulting in the performance of a hysterectomy approximately 3 years ago for menorrhagia. Because of the CT scan findings, she  was ultimately referred to Dr. Colletta Slaughter for evaluation of a possible pelvic malignancy. On February 1, 2022 he performed a laparoscopy and biopsy with evidence of peritoneal carcinomatosis grossly. A \"peritoneal nodule\" and a \"peritoneal implant\"  were both positive for a poorly differentiated metastatic carcinoma potentially consistent with an upper gastrointestinal primary. An omental biopsy showed no evidence of malignancy.   Immunohistochemistries were positive for cytokeratin 7 and negative  for cytokeratin 20. The tumor was weakly positive for CDX2. The only other positive study was MOC-31. Testing through [email or web address] demonstrated no mutation and ERB-B2. There was no microsatellite instability and no mismatch repair protein deficiencies. There were no targetable lesions. A PET scan was subsequently performed on  showing elevated FDG uptake in the left pelvis corresponding with a masslike density concerning  for peritoneal carcinomatosis. There was a small volume of free fluid within the peritoneal cavity. There was moderate right hydroureteronephrosis. Despite the pathologic findings, there was no evidence of FDG activity in the upper gastrointestinal  tract. CA-125 was slightly elevated at 44. Given the uncertainty of her diagnosis, the patient went back for additional surgery on February 15 at which time she underwent bilateral ureteral stent placement and bilateral salpingo-oophorectomies. Pathology  from that surgery was notable for poorly differentiated carcinoma with occasional signet ring features. Immunohistochemical stains were most consistent with a tumor of the upper gastrointestinal tract. She is  1 para 1 abortus 0. There is no  family history of cancer. She has had no difficulties with vomiting. She still notes that some food traverses her esophagus slowly. She had undergone a prior dilatation without any evidence of cancer. She subsequently began treatment with FOLFOX ultimately  with the addition of bevacizumab in 2022. She ultimately received 8 cycles of FOLFOX most of them with Avastin. On , she was taken to surgery for evaluation of debulking and HIPEC. Unfortunately, at the time of surgery her tumor was found to be tightly adherent and impossible to debulk. Despite the FOLFOX, she developed progressive ascites necessitating placement of a Pleurx catheter.   Based on this symptomatic progression, a decision was made to place her on paclitaxel and ramucirumab. In October, she was hospitalized for recurrent nausea and vomiting. She was placed on intravenous fluids and seen by gastroenterology. She underwent an esophageal dilatation as part of her EGD. Most importantly however a decision was made to place her on TPN.      1/19/23: She returns today, on her birthday, for follow-up and C4D15 Cyramza/Taxol. Overall, she continues to tolerate treatment well. She denies any changes since last week. There are no GI or bowel complaints. Weight is stable, eating some by mouth, remains on TPN 12 hours daily. Fatigue is ongoing. There is no cough, shortness of breath, or edema. Neuropathy is stable, no pain. Abd pleurX drainage has decreased, she only got 50-75ml after not drainage for the past 3 weeks which is a great sign of disease response. She denies any fevers or infectious symptoms. 1/26/2023: PROCEDURES PERFORMED:  Cystoscopy with left retrograde pyelogram, right retrograde pyelogram, exchange of right ureteral stent. Interval history:  Patient presents for evaluation prior to receiving cycle 6-day 1 of Taxol/Cyramza. She visited ED on 2/20/2023 for intractable abdominal pain, nausea and vomiting. Vomitus is bilious and recurrent associated with retching and worsening of abdominal pain. She cannot tolerate oral medications and is dependent on TPN for her nutritional needs. She feels very weak and finds it difficult to speak. Denies fevers, chills, chest pain or cough. Denies diarrhea or abdominal distention. .    Review of Systems:  14 point ROS was negative except as per HPI      ECOG PERFORMANCE STATUS - 2 - Ambulatory and capable of all selfcare but unable to carry out any work activities. Up and about more than 50% of waking hours. Pain - /10. None/Minimal pain - not affecting QOL     Fatigue - No flowsheet data found. Distress - No flowsheet data found.          Reviewed and updated this visit by provider:  Tobacco  Allergies  Meds  Problems  Med Hx  Surg Hx  Fam Hx          Current Outpatient Medications   Medication Sig Dispense Refill    prochlorperazine (COMPAZINE) 10 MG tablet Take 1 tablet by mouth every 6 hours as needed (nausea) 20 tablet 0    pantoprazole (PROTONIX) 40 MG tablet Take 1 tablet by mouth in the morning and at bedtime 60 tablet 0    vitamin D (ERGOCALCIFEROL) 1.25 MG (21689 UT) CAPS capsule Take 1 capsule by mouth every 7 days (Patient not taking: Reported on 3/2/2023) 8 capsule 0    metoclopramide (REGLAN) 5 MG tablet Take 1 tablet by mouth 3 times daily (Patient not taking: No sig reported) 90 tablet 0    lidocaine-prilocaine (EMLA) 2.5-2.5 % cream PRN  Apply to port about 45 minutes prior to access (Patient not taking: No sig reported)      ondansetron (ZOFRAN) 8 MG tablet Take 8 mg by mouth every 8 hours as needed (Patient not taking: No sig reported)       No current facility-administered medications for this visit. Facility-Administered Medications Ordered in Other Visits   Medication Dose Route Frequency Provider Last Rate Last Admin    sodium chloride flush 0.9 % injection 10 mL  10 mL IntraVENous PRN Iveth Marroquin MD   10 mL at 03/02/23 0833        OBJECTIVE:  /83   Pulse 94   Temp 98.7 °F (37.1 °C) (Oral)   Resp 12   Ht 5' 4\" (1.626 m)   Wt 117 lb 9.6 oz (53.3 kg)   SpO2 98%   BMI 20.19 kg/m²   Wt Readings from Last 3 Encounters:   03/02/23 117 lb 9.6 oz (53.3 kg)   02/28/23 114 lb (51.7 kg)   02/16/23 116 lb (52.6 kg)       Physical Exam:  Patient is alert and oriented x3. She is in no acute distress. Neck: Supple. There is no thyromegaly  Lungs: The lungs are clear to auscultation. There is no chest wall tenderness and no  use of accessory respiratory musculature. Heart: There is no jugular venous distention. The rate is normal and rhythm regular. The S1 and S2 are normal and there are no murmurs or rubs.     Abdomen: Soft, non-tender, distended, bowel sounds present and normal. peritoneal drainage catheter in place. Skin: No rash, petechiae or ecchymoses. No evidence of malignancy. Extremities: No cyanosis, clubbing; trace lower extremity edema. Labs:  Lab Results   Component Value Date    WBC 5.1 03/02/2023    HGB 7.9 (L) 03/02/2023    HCT 26.8 (L) 03/02/2023    MCV 85.4 03/02/2023     03/02/2023     Lab Results   Component Value Date    NEUTROABS 4.2 05/12/2022    LYMPHOPCT 19 03/02/2023    LYMPHSABS 1.0 03/02/2023    MONOPCT 15 (H) 03/02/2023    MONOSABS 0.8 03/02/2023    EOSABS 0.0 03/02/2023    BASOPCT 0 03/02/2023     Lab Results   Component Value Date     03/02/2023    K 4.2 03/02/2023    CL 99 (L) 03/02/2023    CO2 27 03/02/2023    BUN 23 03/02/2023    CREATININE 0.40 (L) 03/02/2023    GLUCOSE 105 (H) 03/02/2023    CALCIUM 8.7 03/02/2023    PROT 6.5 03/02/2023    LABALBU 2.8 (L) 03/02/2023    BILITOT 0.3 03/02/2023    ALKPHOS 92 03/02/2023    AST 14 (L) 03/02/2023    ALT 18 03/02/2023    LABGLOM >60 03/02/2023    GFRAA >60 09/27/2022    AGRATIO 1.1 (L) 05/12/2022    GLOB 3.7 03/02/2023               Imaging:  CT Result (most recent):  CT CHEST ABDOMEN PELVIS W CONTRAST 12/21/2022    Narrative  EXAM: CT CHEST, ABDOMEN, AND PELVIS WITH CONTRAST    INDICATION: Metastatic carcinoma of unknown primary. COMPARISON: CT abdomen pelvis 10/20/2022, CT chest, abdomen, and pelvis  10/3/2022    TECHNIQUE:  CT imaging was performed of the chest, abdomen, and pelvis after  intravenous injection of 100 mL Isovue 370. Intravenous contrast was used for  better evaluation of solid organs and vascular structures. Oral contrast was  used for bowel opacification. Coronal reformatted imaging provided. Radiation  dose reduction techniques were used for this study.  Our CT scanners use one or  all of the following: Automated exposure control, adjustment of the mA and/or kV  according to patient size, iterative reconstruction. FINDINGS:    CHEST:    Mediastinum and visualized thyroid: No mediastinal mass or lymphadenopathy. Heart: Normal.    Large Vessels: Normal.    Pleura: Normal.    Lungs: No discrete lung nodule. Airways: Normal.    ABDOMEN AND PELVIS:    Liver: Normal in size and morphology. No focal lesions. Gallbladder/biliary: Normal gallbladder. No biliary dilatation. Pancreas: Normal.    Spleen: Normal.    Adrenals: Normal.    Kidneys: Right nephroureteral stent in place. Unchanged to slightly worse  moderate right hydronephrosis. Left pelvic caliectasis or amie hydronephrosis. Bladder: Normal.    Pelvic organs: Status post hysterectomy. Gastrointestinal: Postsurgical changes in the proximal stomach. Stable diffuse  wall thickening of large and small bowel. Peritoneum/retroperitoneum: Drainage catheter terminating in the subhepatic  space unchanged in position. Stable small to moderate volume ascites. Diffuse  thickening of the parietal and visceral peritoneal surfaces. Unchanged omental  caking. Lymph nodes: Normal.    Vessels: Normal.    Bones/Soft tissues: Normal.    Impression  1. Unchanged burden of peritoneal carcinomatosis with stable volume malignant  ascites. 2.  No evidence of disease progression the chest.  3.  Right nephroureteral stent with unchanged slightly worse moderate  hydronephrosis. ASSESSMENT:  Ms. Piedad Titus has an apparent metastatic carcinoma of unknown primary. Pathologically, the tumor seems to resemble a process arising in the upper gastrointestinal tract. However, any such primary is  not visible on EGD or PET scanning. Dr. Bossman Gaviria presumption is that this may have originated from the colon based on its location and behavior. That too would be somewhat inconsistent with the pathology findings. She has been treated with FOLFOX and Avastin.   There was certainly no symptomatic response and in fact she developed progressive and symptomatic ascites suggesting disease progression. She is now on TPN and receiving paclitaxel and ramucirumab. Although there have been no obvious radiographic changes, she does seem to be responding. The drainage of her ascites is much less. She is actually able to eat some food intake and nutrition bit by mouth. She continues to receive TPN. She seems a bit stronger. Protein calorie malnutrition-now on TPN     PLAN:  Proceed with C4D15 Cyramzqa/Taxol. Continue home TPN through [facility] and can continue to try oral intake as tolerated. Return in 1 week for OV/C4D15.      2/2/2023: Patient has remained clinically stable. She plans to see gastroenterology for evaluation of heartburn. Labs and physical exam are adequate to proceed with day 1 of cycle 5 of ramucirumab + paclitaxel. We will plan to repeat imaging next month. She will continue receiving total parenteral nutrition. She will continue to be seen intermittently by clinical nutritionist.  Return office visit will be scheduled as per treatment plan. All questions were answered to the best of my abilities. Pertinent old records including NGS testing were reviewed. No targetable mutations were found. 3/2/2023: Since last visit patient's clinical status has significantly declined. She has intractable nausea, bilious vomiting and abdominal pain rated at 8 out of 10 today. I am concerned that with administration of chemotherapy her symptoms might worsen and therefor D1C6 will be held today. She will therefore be given IV fluid and intravenous antiemetics in the chair today. We shall attempt to admit her directly to the hospital for expedited abdominal imaging and supportive management. Return office visit will be scheduled according to her discharge. Patient/mother verbalized understanding of the above and are in agreement with the management plan.           Shiraz Ernst MD  [facility] Hematology and Oncology  [address], [place] [address]  Office : [phone]  Fax : [phone]    Elements of this note have been dictated using speech recognition software. As a result, errors of speech recognition may have occurred.

## 2023-03-02 NOTE — H&P
Inpatient Hematology / Oncology History and Physical    Reason for Admission:  Intractable nausea and vomiting [R11.2]    Overview:  Ms. Yashira Small was seen for the first time in our office in February 2022. She was a woman of previously good health who began noticing left-sided back discomfort in early January 2022. This was associated  ultimately with a sense of difficulty swallowing and ultimately she presented to [facility] for evaluation. Her symptoms were felt to potentially be indicative of a bowel obstruction and she was sent for a CT scan. This study, performed on January 27, 2022  was notable for a linear calcific density along the course of the proximal left ureter with associated moderate hydronephrosis potentially indicative of an intraureteral calculus. There was also stranding throughout the retroperitoneal soft tissues anterior  to the left psoas muscle with pelvic ascites. There was also wall thickening involving the descending colon. The question of colitis or serosal implants was raised. The patient had undergone a gastric sleeve placement several years prior. She had  also undergone a negative colonoscopy about 3 years prior to these presentations. There was a history of anemia ultimately resulting in the performance of a hysterectomy approximately 3 years ago for menorrhagia. Because of the CT scan findings, she  was ultimately referred to Dr. Flor Najera for evaluation of a possible pelvic malignancy. On February 1, 2022 he performed a laparoscopy and biopsy with evidence of peritoneal carcinomatosis grossly. A \"peritoneal nodule\" and a \"peritoneal implant\"  were both positive for a poorly differentiated metastatic carcinoma potentially consistent with an upper gastrointestinal primary. An omental biopsy showed no evidence of malignancy. Immunohistochemistries were positive for cytokeratin 7 and negative  for cytokeratin 20. The tumor was weakly positive for CDX2.   The only other positive study was MOC-31. Testing through [employer] demonstrated no mutation and ERB-B2. There was no microsatellite instability and no mismatch repair protein deficiencies. There were no targetable lesions. A PET scan was subsequently performed on  showing elevated FDG uptake in the left pelvis corresponding with a masslike density concerning  for peritoneal carcinomatosis. There was a small volume of free fluid within the peritoneal cavity. There was moderate right hydroureteronephrosis. Despite the pathologic findings, there was no evidence of FDG activity in the upper gastrointestinal  tract. CA-125 was slightly elevated at 44. Given the uncertainty of her diagnosis, the patient went back for additional surgery on February 15 at which time she underwent bilateral ureteral stent placement and bilateral salpingo-oophorectomies. Pathology  from that surgery was notable for poorly differentiated carcinoma with occasional signet ring features. Immunohistochemical stains were most consistent with a tumor of the upper gastrointestinal tract. She is  1 para 1 abortus 0. There is no  family history of cancer. She has had no difficulties with vomiting. She still notes that some food traverses her esophagus slowly. She had undergone a prior dilatation without any evidence of cancer. She subsequently began treatment with FOLFOX ultimately  with the addition of bevacizumab in 2022. She ultimately received 8 cycles of FOLFOX most of them with Avastin. On , she was taken to surgery for evaluation of debulking and HIPEC. Unfortunately, at the time of surgery her tumor was found to be tightly adherent and impossible to debulk. Despite the FOLFOX, she developed progressive ascites necessitating placement of a Pleurx catheter. Based on this symptomatic progression, a decision was made to place her on paclitaxel and ramucirumab.   In October, she was hospitalized for recurrent nausea and vomiting. She was placed on intravenous fluids and seen by gastroenterology. She underwent an esophageal dilatation as part of her EGD. Most importantly however a decision was made to place her on TPN.      1/19/23: She returns today, on her birthday, for follow-up and C4D15 Cyramza/Taxol. Overall, she continues to tolerate treatment well. She denies any changes since last week. There are no GI or bowel complaints. Weight is stable, eating some by mouth, remains on TPN 12 hours daily. Fatigue is ongoing. There is no cough, shortness of breath, or edema. Neuropathy is stable, no pain. Abd pleurX drainage has decreased, she only got 50-75ml after not drainage for the past 3 weeks which is a great sign of disease response. She denies any fevers or infectious symptoms. History of Present Illness:  Patient presents for evaluation prior to receiving cycle 6-day 1 of Taxol/Cyramza. She visited ED on 2/20/2023 for intractable abdominal pain, nausea and vomiting. Vomitus is bilious and recurrent associated with retching and worsening of abdominal pain. She cannot tolerate oral medications and is dependent on TPN for her nutritional needs. She feels very weak and finds it difficult to speak. Denies fevers, chills, chest pain or cough. Denies diarrhea or abdominal distention. .      Review of Systems:  14 point ROS was negative except as above.     No Known Allergies  Past Medical History:   Diagnosis Date    Alteration in nutrition     per RD note TPN dependent 12 hours per day- [facility]    Anemia     Colon cancer ([facility] [address] dx 2/2022    followed by dr Dahlia Parsons    COVID-19 12/2020    no hospitalization    GERD (gastroesophageal reflux disease)     managed with med    History of blood transfusion 2022    History of colonoscopy 05/2018    Dr. Deric Mondragon, nl (see media note), R 2028    Hx of blood clots 06/2022    per pt \"small clot on lung identified by CT scan\"  CT Scan impression:- Nonobstructive pulmonary filling defect involving Left Lower Lobe     Hypotension     asymptomatic    Obstruction of fallopian tube     per pt has \"1 good tube\"    Peritoneal carcinomatosis ([address]     chemo; abdominal pleurx    Weight loss     80lbs weight loss after gastric sleeve     Past Surgical History:   Procedure Laterality Date     SECTION      CYSTOSCOPY Bilateral 2022    CYSTOSCOPY BILATERAL RETROGRADE PYELOGRAM performed by Siri Hunter MD at [address] Bilateral 2022    BILATERAL CYSTOSCOPY URETERAL STENT EXCHANGE performed by Siri Hunter MD at [address] Bilateral 10/6/2022    CYSTOSCOPY BILATERAL URETERAL STENT REMOVAL, RIGHT URETERAL [address] performed by Siri Hunter MD at [facility] Bilateral 2023    CYSTOSCOPY / BILATERAL RETROGRADE / RIGHT URETERAL STENT EXCHANGE performed by Siri Hunter MD at [address]  2014    gastric sleeve- Choudhari    HYSTERECTOMY (CERVIX STATUS UNKNOWN)      2018    HYSTERECTOMY (CERVIX STATUS UNKNOWN)  2020    TLH w/ Bilateral salpingectomy and left oophorectomy    IR PERC CATH PLEURAL DRAIN W/IMAG  2022    IR PERC CATH PLEURAL DRAIN W/IMAG 2022 [facility]    IR PORT PLACEMENT EQUAL OR GREATER THAN 5 YEARS  2022    IR PORT PLACEMENT EQUAL OR GREATER THAN 5 YEARS  2022    IR PORT PLACEMENT EQUAL OR GREATER THAN 5 YEARS 2022 [facility]    LAPAROTOMY N/A 2022    EXPLORATORY LAPAROTOMY/ ENTEROENTEROSTOMY performed by Saskia Chaidez MD at [facility]. [address]  age Aguilar Bookman 21s\"    also \"unblocked her FT\"    TONSILLECTOMY      UPPER GASTROINTESTINAL ENDOSCOPY      with dilation    UPPER GASTROINTESTINAL ENDOSCOPY N/A 2022    EGD ESOPHAGOGASTRODUODENOSCOPY OFL 17 To IR after recovery for Para performed by Daisy Garber MD at [address] [place] [address] 10/21/2022    EGD DILATION BALLOON performed by Samantha Rivas MD at [facility] ENDOSCOPY    UROLOGICAL SURGERY Bilateral 03/15/2022    cysto     Family History   Problem Relation Age of Onset    Heart Disease Maternal Grandmother     Hypertension Maternal Grandmother     Heart Disease Maternal Grandfather     Hypertension Maternal Grandfather     Pulmonary Embolism Neg Hx     Colon Cancer Neg Hx     Deep Vein Thrombosis Neg Hx     Ovarian Cancer Neg Hx     Prostate Cancer Neg Hx     Breast Cancer Neg Hx      Social History     Socioeconomic History    Marital status: Single     Spouse name: Not on file    Number of children: Not on file    Years of education: Not on file    Highest education level: Not on file   Occupational History    Not on file   Tobacco Use    Smoking status: Never    Smokeless tobacco: Never   Vaping Use    Vaping Use: Never used   Substance and Sexual Activity    Alcohol use: Not Currently    Drug use: No    Sexual activity: Not on file   Other Topics Concern    Not on file   Social History Narrative    1. Use large speculum. 2.  sister, Marilyn Cordial #[id number] and mom is Christin Traore #[id number] (both Kofoed's pts)  3.   PCP  Dr. Bhavya Rueda ([facility]), GI Dr. Coto Led normal EGD     Social Determinants of Health     Financial Resource Strain: Not on file   Food Insecurity: Not on file   Transportation Needs: Not on file   Physical Activity: Not on file   Stress: Not on file   Social Connections: Not on file   Intimate Partner Violence: Not on file   Housing Stability: Not on file     Current Facility-Administered Medications   Medication Dose Route Frequency Provider Last Rate Last Admin    lidocaine-prilocaine (EMLA) cream   Topical PRN Rosevelt Dach, APRN - CNP        pantoprazole (PROTONIX) 40 mg in sodium chloride (PF) 0.9 % 10 mL injection  40 mg IntraVENous BID Rosevelt Dach, APRN - CNP        0.9 % sodium chloride infusion   IntraVENous Continuous Rosevelt Dach, APRN - CNP enoxaparin (LOVENOX) injection 40 mg  40 mg SubCUTAneous Daily SCOTT Jiménez CNP        polyethylene glycol (GLYCOLAX) packet 17 g  17 g Oral Daily PRN SCOTT Jiménez CNP        acetaminophen (TYLENOL) tablet 650 mg  650 mg Oral Q6H PRN SCOTT Jiménez CNP        HYDROcodone-acetaminophen (NORCO) 5-325 MG per tablet 1 tablet  1 tablet Oral Q6H PRN SCOTT Jiménez CNP        morphine injection 2 mg  2 mg IntraVENous Q4H PRN SCOTT Jiménez CNP        ondansetron [facility]) injection 4 mg  4 mg IntraVENous Q4H PRN SCOTT Jiménez CNP        prochlorperazine (COMPAZINE) injection 10 mg  10 mg IntraVENous Q6H PRN SCOTT Jiménez CNP        iopamidol (ISOVUE-370) 76 % injection 100 mL  100 mL IntraVENous ONCE PRN SCOTT Jiménez CNP        diatrizoate meglumine-sodium (GASTROGRAFIN) 66-10 % solution 15 mL  15 mL Oral ONCE PRN SCOTT Jiménez CNP        sodium chloride flush 0.9 % injection 10 mL  10 mL IntraVENous ONCE PRN SCOTT Jiménez CNP        0.9 % sodium chloride bolus  100 mL IntraVENous Once SCOTT Jiménez CNP         Facility-Administered Medications Ordered in Other Encounters   Medication Dose Route Frequency Provider Last Rate Last Admin    sodium chloride flush 0.9 % injection 10 mL  10 mL IntraVENous PRN Lopez Knott MD   10 mL at 23 0833    sodium chloride flush 0.9 % injection 5-40 mL  5-40 mL IntraVENous PRN Lopez Knott MD   10 mL at 23 1145       Family History: Non contributory other than that elaborated in HPI as appropriate. OBJECTIVE:  Patient Vitals for the past 8 hrs:   BP Temp Temp src Pulse Resp SpO2   23 1237 120/88 98.2 °F (36.8 °C) Oral 82 14 100 %     Temp (24hrs), Av.5 °F (36.9 °C), Min:98.2 °F (36.8 °C), Max:98.7 °F (37.1 °C)    No intake/output data recorded. Physical Exam:  PHYSICAL EXAMINATION:  Vitals were reviewed.   /88   Pulse 82   Temp 98.2 °F (36.8 °C) (Oral)   Resp 14   SpO2 100%   Patient is alert and oriented x3. She is in no acute distress. Neck: Supple. There is no thyromegaly  Lungs: The lungs are clear to auscultation. There is no chest wall tenderness and no  use of accessory respiratory musculature. Heart: There is no jugular venous distention. The rate is normal and rhythm regular. The S1 and S2 are normal and there are no murmurs or rubs. Abdomen: Soft, non-tender, distended, bowel sounds present and normal. peritoneal drainage catheter in place. Skin: No rash, petechiae or ecchymoses. No evidence of malignancy. Extremities: No cyanosis, clubbing; trace lower extremity edema.          Labs:    Recent Results (from the past 24 hour(s))   CBC with Auto Differential    Collection Time: 03/02/23  8:24 AM   Result Value Ref Range    WBC 5.1 4.3 - 11.1 K/uL    RBC 3.14 (L) 4.05 - 5.2 M/uL    Hemoglobin 7.9 (L) 11.7 - 15.4 g/dL    Hematocrit 26.8 (L) 35.8 - 46.3 %    MCV 85.4 82.0 - 102.0 FL    MCH 25.2 (L) 26.1 - 32.9 PG    MCHC 29.5 (L) 31.4 - 35.0 g/dL    RDW 21.4 (H) 11.9 - 14.6 %    Platelets 094 201 - 965 K/uL    MPV 9.5 9.4 - 12.3 FL    nRBC 0.00 0.0 - 0.2 K/uL    Differential Type AUTOMATED      Seg Neutrophils 65 43 - 78 %    Lymphocytes 19 13 - 44 %    Monocytes 15 (H) 4.0 - 12.0 %    Eosinophils % 0 (L) 0.5 - 7.8 %    Basophils 0 0.0 - 2.0 %    Immature Granulocytes 0 0.0 - 5.0 %    Segs Absolute 3.3 1.7 - 8.2 K/UL    Absolute Lymph # 1.0 0.5 - 4.6 K/UL    Absolute Mono # 0.8 0.1 - 1.3 K/UL    Absolute Eos # 0.0 0.0 - 0.8 K/UL    Basophils Absolute 0.0 0.0 - 0.2 K/UL    Absolute Immature Granulocyte 0.0 0.0 - 0.5 K/UL   Comprehensive Metabolic Panel    Collection Time: 03/02/23  8:24 AM   Result Value Ref Range    Sodium 134 133 - 143 mmol/L    Potassium 4.2 3.5 - 5.1 mmol/L    Chloride 99 (L) 101 - 110 mmol/L    CO2 27 21 - 32 mmol/L    Anion Gap 8 2 - 11 mmol/L    Glucose 105 (H) 65 - 100 mg/dL    BUN 23 6 - 23 MG/DL    Creatinine 0.40 (L) 0.6 - 1.0 MG/DL    Est, Glom Filt Rate >60 >60 ml/min/1.73m2    Calcium 8.7 8.3 - 10.4 MG/DL    Total Bilirubin 0.3 0.2 - 1.1 MG/DL    ALT 18 12 - 65 U/L    AST 14 (L) 15 - 37 U/L    Alk Phosphatase 92 50 - 136 U/L    Total Protein 6.5 6.3 - 8.2 g/dL    Albumin 2.8 (L) 3.5 - 5.0 g/dL    Globulin 3.7 2.8 - 4.5 g/dL    Albumin/Globulin Ratio 0.8 0.4 - 1.6     Magnesium    Collection Time: 03/02/23  8:24 AM   Result Value Ref Range    Magnesium 2.0 1.8 - 2.4 mg/dL   Protein, urine, random    Collection Time: 03/02/23  8:24 AM   Result Value Ref Range    Protein, Urine, Random 96 (H) <11.9 mg/dL   CEA    Collection Time: 03/02/23  8:24 AM   Result Value Ref Range    CEA 2.8 0.0 - 3.0 ng/mL       ASSESSMENT:  Ms. Walker has an apparent metastatic carcinoma of unknown primary. Pathologically, the tumor seems to resemble a process arising in the upper gastrointestinal tract. However, any such primary is  not visible on EGD or PET scanning. Dr. Lopez Jarrett presumption is that this may have originated from the colon based on its location and behavior. That too would be somewhat inconsistent with the pathology findings. She has been treated with FOLFOX and Avastin. There was certainly no symptomatic response and in fact she developed progressive and symptomatic ascites suggesting disease progression. She is now on TPN and receiving paclitaxel and ramucirumab. Although there have been no obvious radiographic changes, she does seem to be responding. The drainage of her ascites is much less. She is actually able to eat some food intake and nutrition bit by mouth. She continues to receive TPN. She seems a bit stronger. Protein calorie malnutrition-now on TPN     PLAN:     3/2/2023: Since last visit patient's clinical status has significantly declined. She has intractable nausea, bilious vomiting and abdominal pain rated at 8 out of 10 today.   I am concerned that with administration of chemotherapy her symptoms might worsen and therefor D1C6 ramucirumab + taxol will be held today. She will be given IV fluid and intravenous antiemetics in the chair today. We shall attempt to admit her directly to the hospital for expedited abdominal imaging and supportive management. Return office visit will be scheduled according to her discharge. Patient/mother verbalized understanding of the above and are in agreement with the management plan.         Sage Chan MD  [address] [place] [address] Hematology Oncology  [address] [place] [address]  Office : [phone]  Fax : [phone]

## 2023-03-02 NOTE — PROGRESS NOTES
3/2/23 saw pt today with Dr. Edis Collins for pre chemo cycle 6 day 1 taxol/cyramza. She is having uncontrolled pain and intractable N/V. Will admit for work up and repeat imaging. Bed not ready yet so will give emend and IVF in infusion while waiting. Follow up will be arranged at discharge. Navigation will continue to follow.

## 2023-03-02 NOTE — CONSULTS
Comprehensive Nutrition Assessment    Type and Reason for Visit: Initial, Consult  TPN Management (Oncology)    Nutrition Recommendations/Plan:   Parenteral Nutrition:  Central parenteral nutrition  begins at 1800 today  Initiate: Dex 10% , 4.25% AA 2 L (85ml/hr)   Hold 250 ml 20% lipids daily pending am labs  To provide: 1020 kcal/d (64% of needs), 85 grams of protein/d (100% of needs), 200 grams of CHO/d and ~2000 ml of total volume/d. Lytes/L:   Sodium ( 100 meq NaCl), Potassium ( 15 meq KCl) Phosphorus ( 10 mmol KPO4), Calcium (4.5 meq), Magnesium 8 meq   Other additives:   MVI Monday Wednesday Friday due to [employer], MTE  Nutrition Related Medication Management:  Electrolyte Replacement Protocol PRN Potassium, Phosphorus, and Magnesium   IVF:  Discontinue at 1800  Labs:   CMP daily per MD  Mg daily per MD  Phos daily x 3 days at initiation then MWF    Triglyceride tomorrow  POC Glucoses/SSI Not indicated  Meals and Snacks:  Diet: Continue current order  Nutrition Supplement Therapy:  Medical food supplement therapy:  Initiate Ensure Clear three times per day (this provides 240 kcal and 8 grams protein per bottle)     Malnutrition Assessment:  Malnutrition Status: Insufficient data (previous severe malnutrition, NFPE deferred thi date, has been gaining weight on home PN)    defered today  Nutrition Assessment:  Nutrition History: Patient known to RD. Eating normally 3 meals per day prior to 9/2022 when she presented with N/V and abdominal distention. She was discharged on a regular diet with Ensure Clear. She presented back 10/2022 with N/V and was eventually started on TPN and discharged on TPN. She remains TPN for primary needs. Weight has been trending up per outpatient office visits. Followed by outpatient RD and RD with [facility].           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 She presented with abdominal pain, difficulty swallowing \"stuck in throat and vomits. \" She was supposed to have outpt EGD but presented to ER with intractable nausea and vomiting. She is now s/p EGD 10/21 with findings of LA grade D esophagitis, proximal esophageal stricture, Sckatzki's ring. She presented for chemo. She was a direct admit for intractable N/V and worsening abdominal pain. Nutrition Interval:  Patient seen reclined in bed with mother at bedside. She states last TPN infusion was last evening. No questions. Abdominal Status (last documented):    ,     Pertinent Medications: Protonix,   Continuous: none  IVF: NS @ 100 ml/hr  Electrolyte Replacement:  none thus far  PRN: Zofran, Glycolax, compazine  Pertinent Labs:   Lab Results   Component Value Date/Time     03/02/2023 08:24 AM    K 4.2 03/02/2023 08:24 AM    CL 99 03/02/2023 08:24 AM    CO2 27 03/02/2023 08:24 AM    BUN 23 03/02/2023 08:24 AM    CREATININE 0.40 03/02/2023 08:24 AM    GLUCOSE 105 03/02/2023 08:24 AM    CALCIUM 8.7 03/02/2023 08:24 AM    PHOS 3.2 10/26/2022 03:12 AM    MG 2.0 03/02/2023 08:24 AM     Lab Results   Component Value Date/Time    TRIG 326 10/26/2022 03:12 AM    TRIG 148 10/25/2022 04:00 AM    TRIG 442 10/24/2022 04:10 AM     Remarkable for: Na low normal, Cl depleted - likely due to emesis, mildly elevated glucose. She has historically had slight intolerance to lipids thus will hold this evening and consider tomorrow pending labs. Current Nutrition Therapies:  ADULT DIET;  Clear Liquid    Current Intake:   Average Meal Intake: Unable to assess Average Supplements Intake: None Ordered      Anthropometric Measures:  Height: 5' 4\" (162.6 cm)  Current Body Wt: 117 lb 8.1 oz (53.3 kg) (3/2), Weight source: Standing Scale (office PTA)  BMI: 20.2, Normal Weight (BMI 18.5-24. 9)  Admission Body Weight: 117 lb 8.1 oz (53.3 kg) (3/2 office PTA)  Ideal Body Weight (Kg) (Calculated): 55 kg (120 lbs), 97.9 %  Usual Body Wt: 126 lb (57.2 kg) (3/16/22 office visit), Percent weight change: -6.7       BMI Category Normal Weight (BMI 18.5-24. 9)  Estimated Daily Nutrient Needs:  Energy (kcal/day): 4261-9250 (30-35 kcal/kg) (Kcal/kg Weight used: 53.3 kg Current  Protein (g/day): 64-75 (1.2-1.4 g/kg) Weight Used: (Current) 53.3 kg  Fluid (ml/day):   (1 ml/kcal)    Nutrition Diagnosis:   Inadequate oral intake related to altered GI function as evidenced by  (Gastric cancer with peritoneal carcinomatosis, TPN dependant at baseline)  Nutrition Interventions:   Food and/or Nutrient Delivery: Continue Current Diet, Start Parenteral Nutrition, Start Oral Nutrition Supplement     Coordination of Nutrition Care: Continue to monitor while inpatient       Goals:       Active Goal: Tolerate nutrition support at goal rate, within 2 days       Nutrition Monitoring and Evaluation:      Food/Nutrient Intake Outcomes: Food and Nutrient Intake, Supplement Intake, Parenteral Nutrition Intake/Tolerance  Physical Signs/Symptoms Outcomes: Biochemical Data, GI Status, Nausea or Vomiting, Weight    Discharge Planning:    Parenteral Nutrition    Esther RizviWayside Emergency Hospitaldevan

## 2023-03-02 NOTE — PROGRESS NOTES
Arrived to the [facility]. IVF, Emend and Benadryl completed. Patient tolerated without problems. Any issues or concerns during appointment: no.  Patient aware of next lab and [facility] office visit to be scheduled after hospital discharge  Discharged via [facility] with family. Patient will go to room 514 at [facility].

## 2023-03-02 NOTE — PATIENT INSTRUCTIONS
Patient Instructions from Today's Visit    Reason for Visit:  Pre chemo cycle 6 day 1 taxol/cyramza     Plan:  No chemo today  Will admit to hospital for work up and repeat imaging    Follow Up:   Will be arranged at discharge     Recent Lab Results:  Hospital Outpatient Visit on 03/02/2023   Component Date Value Ref Range Status    WBC 03/02/2023 5.1  4.3 - 11.1 K/uL Final    RBC 03/02/2023 3.14 (A)  4.05 - 5.2 M/uL Final    Hemoglobin 03/02/2023 7.9 (A)  11.7 - 15.4 g/dL Final    Hematocrit 03/02/2023 26.8 (A)  35.8 - 46.3 % Final    MCV 03/02/2023 85.4  82.0 - 102.0 FL Final    MCH 03/02/2023 25.2 (A)  26.1 - 32.9 PG Final    MCHC 03/02/2023 29.5 (A)  31.4 - 35.0 g/dL Final    RDW 03/02/2023 21.4 (A)  11.9 - 14.6 % Final    Platelets 95/77/5557 339  150 - 450 K/uL Final    MPV 03/02/2023 9.5  9.4 - 12.3 FL Final    nRBC 03/02/2023 0.00  0.0 - 0.2 K/uL Final    **Note: Absolute NRBC parameter is now reported with Hemogram**    Differential Type 03/02/2023 AUTOMATED    Final    Seg Neutrophils 03/02/2023 65  43 - 78 % Final    Lymphocytes 03/02/2023 19  13 - 44 % Final    Monocytes 03/02/2023 15 (A)  4.0 - 12.0 % Final    Eosinophils % 03/02/2023 0 (A)  0.5 - 7.8 % Final    Basophils 03/02/2023 0  0.0 - 2.0 % Final    Immature Granulocytes 03/02/2023 0  0.0 - 5.0 % Final    Segs Absolute 03/02/2023 3.3  1.7 - 8.2 K/UL Final    Absolute Lymph # 03/02/2023 1.0  0.5 - 4.6 K/UL Final    Absolute Mono # 03/02/2023 0.8  0.1 - 1.3 K/UL Final    Absolute Eos # 03/02/2023 0.0  0.0 - 0.8 K/UL Final    Basophils Absolute 03/02/2023 0.0  0.0 - 0.2 K/UL Final    Absolute Immature Granulocyte 03/02/2023 0.0  0.0 - 0.5 K/UL Final    Protein, Urine, Random 03/02/2023 96 (A)  <11.9 mg/dL Final   Admission on 02/28/2023, Discharged on 02/28/2023   Component Date Value Ref Range Status    Sodium 02/28/2023 132 (A)  133 - 143 mmol/L Final    Potassium 02/28/2023 4.3  3.5 - 5.1 mmol/L Final    Chloride 02/28/2023 99 (A)  101 - 110 mmol/L Final    CO2 02/28/2023 28  21 - 32 mmol/L Final    Anion Gap 02/28/2023 5  2 - 11 mmol/L Final    Glucose 02/28/2023 100  65 - 100 mg/dL Final    BUN 02/28/2023 20  6 - 23 MG/DL Final    Creatinine 02/28/2023 0.50 (A)  0.6 - 1.0 MG/DL Final    Est, Glom Filt Rate 02/28/2023 >60  >60 ml/min/1.73m2 Final    Comment:      Pediatric calculator link: Ryan.at. org/professionals/kdoqi/gfr_calculatorped       These results are not intended for use in patients <25years of age. eGFR results are calculated without a race factor using  the 2021 CKD-EPI equation. Careful clinical correlation is recommended, particularly when comparing to results calculated using previous equations. The CKD-EPI equation is less accurate in patients with extremes of muscle mass, extra-renal metabolism of creatinine, excessive creatine ingestion, or following therapy that affects renal tubular secretion.       Calcium 02/28/2023 9.2  8.3 - 10.4 MG/DL Final    WBC 02/28/2023 4.3  4.3 - 11.1 K/uL Final    RBC 02/28/2023 3.60 (A)  4.05 - 5.2 M/uL Final    Hemoglobin 02/28/2023 9.3 (A)  11.7 - 15.4 g/dL Final    Hematocrit 02/28/2023 31.1 (A)  35.8 - 46.3 % Final    MCV 02/28/2023 86.4  82 - 102 FL Final    MCH 02/28/2023 25.8 (A)  26.1 - 32.9 PG Final    MCHC 02/28/2023 29.9 (A)  31.4 - 35.0 g/dL Final    RDW 02/28/2023 21.8 (A)  11.9 - 14.6 % Final    Platelets 42/36/4984 391  150 - 450 K/uL Final    MPV 02/28/2023 10.7  9.4 - 12.3 FL Final    nRBC 02/28/2023 0.00  0.0 - 0.2 K/uL Final    **Note: Absolute NRBC parameter is now reported with Hemogram**    Differential Type 02/28/2023 AUTOMATED    Final    Seg Neutrophils 02/28/2023 55  43 - 78 % Final    Lymphocytes 02/28/2023 27  13 - 44 % Final    Monocytes 02/28/2023 18 (A)  4.0 - 12.0 % Final    Eosinophils % 02/28/2023 0 (A)  0.5 - 7.8 % Final    Basophils 02/28/2023 0  0.0 - 2.0 % Final    Immature Granulocytes 02/28/2023 0  0.0 - 5.0 % Final    Segs Absolute 02/28/2023 2. 3  1.7 - 8.2 K/UL Final    Absolute Lymph # 02/28/2023 1.1  0.5 - 4.6 K/UL Final    Absolute Mono # 02/28/2023 0.8  0.1 - 1.3 K/UL Final    Absolute Eos # 02/28/2023 0.0  0.0 - 0.8 K/UL Final    Basophils Absolute 02/28/2023 0.0  0.0 - 0.2 K/UL Final    Absolute Immature Granulocyte 02/28/2023 0.0  0.0 - 0.5 K/UL Final    Magnesium 02/28/2023 1.9  1.8 - 2.4 mg/dL Final         Treatment Summary has been discussed and given to patient: no        -------------------------------------------------------------------------------------------------------------------  Please call our office at [phone] if you have any  of the following symptoms:   Fever of 100.5 or greater  Chills  Shortness of breath  Swelling or pain in one leg    After office hours an answering service is available and will contact a provider for emergencies or if you are experiencing any of the above symptoms. Patient did express an interest in My Chart. My Chart log in information explained on the after visit summary printout at the [facility] desk.     Conception Needy, RN, BSN  Nurse Navigator  [phone] cell  [email or web address]

## 2023-03-03 LAB
ALBUMIN SERPL-MCNC: 2.4 G/DL (ref 3.5–5)
ALBUMIN/GLOB SERPL: 0.7 (ref 0.4–1.6)
ALP SERPL-CCNC: 75 U/L (ref 50–136)
ALT SERPL-CCNC: 13 U/L (ref 12–65)
ANION GAP SERPL CALC-SCNC: 4 MMOL/L (ref 2–11)
AST SERPL-CCNC: 12 U/L (ref 15–37)
BASOPHILS # BLD: 0 K/UL (ref 0–0.2)
BASOPHILS NFR BLD: 0 % (ref 0–2)
BILIRUB SERPL-MCNC: 0.3 MG/DL (ref 0.2–1.1)
BUN SERPL-MCNC: 20 MG/DL (ref 6–23)
CALCIUM SERPL-MCNC: 8.4 MG/DL (ref 8.3–10.4)
CHLORIDE SERPL-SCNC: 101 MMOL/L (ref 101–110)
CO2 SERPL-SCNC: 26 MMOL/L (ref 21–32)
CREAT SERPL-MCNC: 0.4 MG/DL (ref 0.6–1)
DIFFERENTIAL METHOD BLD: ABNORMAL
EOSINOPHIL # BLD: 0 K/UL (ref 0–0.8)
EOSINOPHIL NFR BLD: 0 % (ref 0.5–7.8)
ERYTHROCYTE [DISTWIDTH] IN BLOOD BY AUTOMATED COUNT: 21 % (ref 11.9–14.6)
GLOBULIN SER CALC-MCNC: 3.4 G/DL (ref 2.8–4.5)
GLUCOSE SERPL-MCNC: 112 MG/DL (ref 65–100)
HCT VFR BLD AUTO: 23.2 % (ref 35.8–46.3)
HGB BLD-MCNC: 6.8 G/DL (ref 11.7–15.4)
HISTORY CHECK: NORMAL
IMM GRANULOCYTES # BLD AUTO: 0 K/UL (ref 0–0.5)
IMM GRANULOCYTES NFR BLD AUTO: 0 % (ref 0–5)
LYMPHOCYTES # BLD: 1.3 K/UL (ref 0.5–4.6)
LYMPHOCYTES NFR BLD: 28 % (ref 13–44)
MAGNESIUM SERPL-MCNC: 2.1 MG/DL (ref 1.8–2.4)
MCH RBC QN AUTO: 25.5 PG (ref 26.1–32.9)
MCHC RBC AUTO-ENTMCNC: 29.3 G/DL (ref 31.4–35)
MCV RBC AUTO: 86.9 FL (ref 82–102)
MONOCYTES # BLD: 0.7 K/UL (ref 0.1–1.3)
MONOCYTES NFR BLD: 15 % (ref 4–12)
NEUTS SEG # BLD: 2.6 K/UL (ref 1.7–8.2)
NEUTS SEG NFR BLD: 57 % (ref 43–78)
NRBC # BLD: 0 K/UL (ref 0–0.2)
PHOSPHATE SERPL-MCNC: 3.5 MG/DL (ref 2.5–4.5)
PLATELET # BLD AUTO: 303 K/UL (ref 150–450)
PMV BLD AUTO: 10.7 FL (ref 9.4–12.3)
POTASSIUM SERPL-SCNC: 4.1 MMOL/L (ref 3.5–5.1)
PROT SERPL-MCNC: 5.8 G/DL (ref 6.3–8.2)
RBC # BLD AUTO: 2.67 M/UL (ref 4.05–5.2)
SODIUM SERPL-SCNC: 131 MMOL/L (ref 133–143)
TRIGL SERPL-MCNC: 100 MG/DL (ref 35–150)
WBC # BLD AUTO: 4.5 K/UL (ref 4.3–11.1)

## 2023-03-03 PROCEDURE — 2580000003 HC RX 258: Performed by: NURSE PRACTITIONER

## 2023-03-03 PROCEDURE — 36415 COLL VENOUS BLD VENIPUNCTURE: CPT

## 2023-03-03 PROCEDURE — 85025 COMPLETE CBC W/AUTO DIFF WBC: CPT

## 2023-03-03 PROCEDURE — 6360000002 HC RX W HCPCS: Performed by: NURSE PRACTITIONER

## 2023-03-03 PROCEDURE — 6360000002 HC RX W HCPCS: Performed by: INTERNAL MEDICINE

## 2023-03-03 PROCEDURE — 6370000000 HC RX 637 (ALT 250 FOR IP): Performed by: NURSE PRACTITIONER

## 2023-03-03 PROCEDURE — 36430 TRANSFUSION BLD/BLD COMPNT: CPT

## 2023-03-03 PROCEDURE — 84100 ASSAY OF PHOSPHORUS: CPT

## 2023-03-03 PROCEDURE — 36591 DRAW BLOOD OFF VENOUS DEVICE: CPT

## 2023-03-03 PROCEDURE — 86901 BLOOD TYPING SEROLOGIC RH(D): CPT

## 2023-03-03 PROCEDURE — 87040 BLOOD CULTURE FOR BACTERIA: CPT

## 2023-03-03 PROCEDURE — 2500000003 HC RX 250 WO HCPCS: Performed by: INTERNAL MEDICINE

## 2023-03-03 PROCEDURE — 1100000000 HC RM PRIVATE

## 2023-03-03 PROCEDURE — 2500000003 HC RX 250 WO HCPCS: Performed by: NURSE PRACTITIONER

## 2023-03-03 PROCEDURE — 2580000003 HC RX 258: Performed by: INTERNAL MEDICINE

## 2023-03-03 PROCEDURE — 84478 ASSAY OF TRIGLYCERIDES: CPT

## 2023-03-03 PROCEDURE — 86923 COMPATIBILITY TEST ELECTRIC: CPT

## 2023-03-03 PROCEDURE — 87086 URINE CULTURE/COLONY COUNT: CPT

## 2023-03-03 PROCEDURE — 83735 ASSAY OF MAGNESIUM: CPT

## 2023-03-03 PROCEDURE — 80053 COMPREHEN METABOLIC PANEL: CPT

## 2023-03-03 PROCEDURE — 99232 SBSQ HOSP IP/OBS MODERATE 35: CPT | Performed by: INTERNAL MEDICINE

## 2023-03-03 PROCEDURE — P9040 RBC LEUKOREDUCED IRRADIATED: HCPCS

## 2023-03-03 PROCEDURE — A4216 STERILE WATER/SALINE, 10 ML: HCPCS | Performed by: NURSE PRACTITIONER

## 2023-03-03 PROCEDURE — 30233N1 TRANSFUSION OF NONAUTOLOGOUS RED BLOOD CELLS INTO PERIPHERAL VEIN, PERCUTANEOUS APPROACH: ICD-10-PCS | Performed by: INTERNAL MEDICINE

## 2023-03-03 PROCEDURE — C9113 INJ PANTOPRAZOLE SODIUM, VIA: HCPCS | Performed by: NURSE PRACTITIONER

## 2023-03-03 PROCEDURE — 6370000000 HC RX 637 (ALT 250 FOR IP): Performed by: INTERNAL MEDICINE

## 2023-03-03 RX ORDER — DIPHENHYDRAMINE HCL 25 MG
25 CAPSULE ORAL EVERY 6 HOURS PRN
Status: DISCONTINUED | OUTPATIENT
Start: 2023-03-03 | End: 2023-03-07 | Stop reason: HOSPADM

## 2023-03-03 RX ORDER — SENNA AND DOCUSATE SODIUM 50; 8.6 MG/1; MG/1
1 TABLET, FILM COATED ORAL 2 TIMES DAILY
Status: DISCONTINUED | OUTPATIENT
Start: 2023-03-03 | End: 2023-03-07 | Stop reason: HOSPADM

## 2023-03-03 RX ORDER — METRONIDAZOLE 500 MG/100ML
500 INJECTION, SOLUTION INTRAVENOUS EVERY 8 HOURS
Status: DISCONTINUED | OUTPATIENT
Start: 2023-03-03 | End: 2023-03-07 | Stop reason: HOSPADM

## 2023-03-03 RX ADMIN — METRONIDAZOLE 500 MG: 500 INJECTION, SOLUTION INTRAVENOUS at 10:56

## 2023-03-03 RX ADMIN — PROCHLORPERAZINE EDISYLATE 10 MG: 5 INJECTION INTRAMUSCULAR; INTRAVENOUS at 10:14

## 2023-03-03 RX ADMIN — POTASSIUM CHLORIDE: 2 INJECTION, SOLUTION, CONCENTRATE INTRAVENOUS at 18:16

## 2023-03-03 RX ADMIN — CEFTRIAXONE SODIUM 2000 MG: 2 INJECTION, POWDER, FOR SOLUTION INTRAMUSCULAR; INTRAVENOUS at 10:03

## 2023-03-03 RX ADMIN — SODIUM CHLORIDE, PRESERVATIVE FREE 40 MG: 5 INJECTION INTRAVENOUS at 22:30

## 2023-03-03 RX ADMIN — METRONIDAZOLE 500 MG: 500 INJECTION, SOLUTION INTRAVENOUS at 17:25

## 2023-03-03 RX ADMIN — SENNOSIDES AND DOCUSATE SODIUM 1 TABLET: 8.6; 5 TABLET ORAL at 22:31

## 2023-03-03 RX ADMIN — MORPHINE SULFATE 2 MG: 2 INJECTION, SOLUTION INTRAMUSCULAR; INTRAVENOUS at 10:14

## 2023-03-03 RX ADMIN — ENOXAPARIN SODIUM 40 MG: 100 INJECTION SUBCUTANEOUS at 19:41

## 2023-03-03 RX ADMIN — ACETAMINOPHEN 650 MG: 325 TABLET ORAL at 12:45

## 2023-03-03 RX ADMIN — MORPHINE SULFATE 2 MG: 2 INJECTION, SOLUTION INTRAMUSCULAR; INTRAVENOUS at 18:26

## 2023-03-03 RX ADMIN — MORPHINE SULFATE 2 MG: 2 INJECTION, SOLUTION INTRAMUSCULAR; INTRAVENOUS at 01:22

## 2023-03-03 RX ADMIN — PROCHLORPERAZINE EDISYLATE 10 MG: 5 INJECTION INTRAMUSCULAR; INTRAVENOUS at 18:26

## 2023-03-03 RX ADMIN — DIPHENHYDRAMINE HYDROCHLORIDE 25 MG: 25 CAPSULE ORAL at 12:45

## 2023-03-03 RX ADMIN — SODIUM CHLORIDE, PRESERVATIVE FREE 40 MG: 5 INJECTION INTRAVENOUS at 10:15

## 2023-03-03 RX ADMIN — PROCHLORPERAZINE EDISYLATE 10 MG: 5 INJECTION INTRAMUSCULAR; INTRAVENOUS at 01:22

## 2023-03-03 ASSESSMENT — PAIN DESCRIPTION - ORIENTATION
ORIENTATION: MID
ORIENTATION: MID

## 2023-03-03 ASSESSMENT — PAIN SCALES - GENERAL
PAINLEVEL_OUTOF10: 0
PAINLEVEL_OUTOF10: 5
PAINLEVEL_OUTOF10: 7
PAINLEVEL_OUTOF10: 0
PAINLEVEL_OUTOF10: 7
PAINLEVEL_OUTOF10: 10
PAINLEVEL_OUTOF10: 0

## 2023-03-03 ASSESSMENT — PAIN DESCRIPTION - DESCRIPTORS
DESCRIPTORS: ACHING
DESCRIPTORS: ACHING

## 2023-03-03 ASSESSMENT — PAIN DESCRIPTION - LOCATION
LOCATION: ABDOMEN
LOCATION: ABDOMEN

## 2023-03-03 ASSESSMENT — PAIN DESCRIPTION - ONSET: ONSET: GRADUAL

## 2023-03-03 ASSESSMENT — PAIN - FUNCTIONAL ASSESSMENT
PAIN_FUNCTIONAL_ASSESSMENT: ACTIVITIES ARE NOT PREVENTED
PAIN_FUNCTIONAL_ASSESSMENT: PREVENTS OR INTERFERES WITH MANY ACTIVE NOT PASSIVE ACTIVITIES

## 2023-03-03 ASSESSMENT — PAIN DESCRIPTION - FREQUENCY: FREQUENCY: CONTINUOUS

## 2023-03-03 ASSESSMENT — PAIN DESCRIPTION - PAIN TYPE: TYPE: ACUTE PAIN

## 2023-03-03 NOTE — PROGRESS NOTES
[facility] Hematology & Oncology        Inpatient Hematology / Oncology Progress Note    Reason for Admission:  Intractable nausea and vomiting [R11.2]    24 Hour Events:  Afebrile, VSS  CT AP with peritonitis and/or carcinomatosis, acute pyelonephritis, and colitis  C/o nausea and constipation this AM  Na+ down to 131    Transfusions: 1 unit PRBCs  Replacements: None        ROS:  Constitutional: Negative for fever, chills. CV: Negative for chest pain, palpitations, edema. Respiratory: Negative for dyspnea, cough, wheezing. GI: +abd pain, N/V, constipation    10 point review of systems is otherwise negative with the exception of the elements mentioned above in the HPI.        No Known Allergies  Past Medical History:   Diagnosis Date    Alteration in nutrition     per RD note TPN dependent 12 hours per day- [facility]    Anemia     Colon cancer ([address] dx 2022    followed by dr Danette EL-19 2020    no hospitalization    GERD (gastroesophageal reflux disease)     managed with med    History of blood transfusion     History of colonoscopy 2018    Dr. Pardeep Claudio, nl (see media note), R     Hx of blood clots 2022    per pt \"small clot on lung identified by CT scan\"  CT Scan impression:- Nonobstructive pulmonary filling defect involving Left Lower Lobe     Hypotension     asymptomatic    Obstruction of fallopian tube     per pt has \"1 good tube\"    Peritoneal carcinomatosis ([address]     chemo; abdominal pleurx    Weight loss     80lbs weight loss after gastric sleeve     Past Surgical History:   Procedure Laterality Date     SECTION      CYSTOSCOPY Bilateral 2022    CYSTOSCOPY BILATERAL RETROGRADE PYELOGRAM performed by Jason Christie MD at [address] Bilateral 2022    BILATERAL CYSTOSCOPY URETERAL STENT EXCHANGE performed by Jason Christie MD at [address] Bilateral 10/6/2022    CYSTOSCOPY BILATERAL URETERAL STENT REMOVAL, RIGHT URETERAL STENT PLACEMENT performed by Mirella Manzo MD at [facility] MAIN OR    CYSTOSCOPY Bilateral 1/26/2023    CYSTOSCOPY / BILATERAL RETROGRADE / RIGHT URETERAL STENT EXCHANGE performed by Mirella Manzo MD at [facility] MAIN OR    GASTRIC BYPASS SURGERY  07/23/2014    gastric sleeve- [place]    HYSTERECTOMY (CERVIX STATUS UNKNOWN)      2018    HYSTERECTOMY (CERVIX STATUS UNKNOWN)  07/09/2020    TLH w/ Bilateral salpingectomy and left oophorectomy    IR PERC CATH PLEURAL DRAIN W/IMAG  9/26/2022    IR PERC CATH PLEURAL DRAIN W/IMAG 9/26/2022 [facility]    IR PORT PLACEMENT EQUAL OR GREATER THAN 5 YEARS  2/28/2022    IR PORT PLACEMENT EQUAL OR GREATER THAN 5 YEARS  2/28/2022    IR PORT PLACEMENT EQUAL OR GREATER THAN 5 YEARS 2/28/2022 [facility]    LAPAROTOMY N/A 8/17/2022    EXPLORATORY LAPAROTOMY/ ENTEROENTEROSTOMY performed by Marek Polk MD at [facility]. [address]  age Jerral Barnacle 21s\"    also \"unblocked her FT\"    TONSILLECTOMY      UPPER GASTROINTESTINAL ENDOSCOPY      with dilation    UPPER GASTROINTESTINAL ENDOSCOPY N/A 9/13/2022    EGD ESOPHAGOGASTRODUODENOSCOPY OFL 17 To IR after recovery for Para performed by Liz Gardner MD at [facility] ENDOSCOPY    UPPER GASTROINTESTINAL ENDOSCOPY N/A 10/21/2022    EGD DILATION BALLOON performed by Rafaela Carmichael MD at [facility] ENDOSCOPY    UROLOGICAL SURGERY Bilateral 03/15/2022    cysto     Family History   Problem Relation Age of Onset    Heart Disease Maternal Grandmother     Hypertension Maternal Grandmother     Heart Disease Maternal Grandfather     Hypertension Maternal Grandfather     Pulmonary Embolism Neg Hx     Colon Cancer Neg Hx     Deep Vein Thrombosis Neg Hx     Ovarian Cancer Neg Hx     Prostate Cancer Neg Hx     Breast Cancer Neg Hx      Social History     Socioeconomic History    Marital status: Single     Spouse name: Not on file    Number of children: Not on file    Years of education: Not on file    Highest education level: Not on file   Occupational History    Not on file   Tobacco Use    Smoking status: Never    Smokeless tobacco: Never   Vaping Use    Vaping Use: Never used   Substance and Sexual Activity    Alcohol use: Not Currently    Drug use: No    Sexual activity: Not on file   Other Topics Concern    Not on file   Social History Narrative    1. Use large speculum. 2.  sister, Walter [address] #[id number] and mom is Candi Mcnamara #[id number] (both Kofoed's pts)  3.   PCP  Dr. Kendra Price ([facility] GI Dr. Roro Bello-2018 normal EGD     Social Determinants of Health     Financial Resource Strain: Not on file   Food Insecurity: Not on file   Transportation Needs: Not on file   Physical Activity: Not on file   Stress: Not on file   Social Connections: Not on file   Intimate Partner Violence: Not on file   Housing Stability: Not on file     Current Facility-Administered Medications   Medication Dose Route Frequency Provider Last Rate Last Admin    cefTRIAXone (ROCEPHIN) 2,000 mg in sodium chloride 0.9 % 50 mL IVPB (mini-bag)  2,000 mg IntraVENous Q24H Alt[place]ont Conquest, APRN - CNP        metronidazole (FLAGYL) 500 mg in 0.9% NaCl 100 mL IVPB premix  500 mg IntraVENous Q8H [place]ont Conquest, APRN - CNP        lidocaine-prilocaine (EMLA) cream   Topical PRN Altamont Conquest, APRN - CNP        pantoprazole (PROTONIX) 40 mg in sodium chloride (PF) 0.9 % 10 mL injection  40 mg IntraVENous BID Altamont Conquest, APRN - CNP   40 mg at 03/02/23 2001    enoxaparin (LOVENOX) injection 40 mg  40 mg SubCUTAneous Daily Altamont Conquest, APRN - CNP   40 mg at 03/02/23 1800    polyethylene glycol (GLYCOLAX) packet 17 g  17 g Oral Daily PRN Altamont Conquest, APRN - CNP        acetaminophen (TYLENOL) tablet 650 mg  650 mg Oral Q6H PRN Altamont Conquest, APRN - CNP        HYDROcodone-acetaminophen (NORCO) 5-325 MG per tablet 1 tablet  1 tablet Oral Q6H PRN Altamont Conquest, APRN - CNP        morphine injection 2 mg  2 mg IntraVENous Q4H PRN Altamont Conquest, APRN - CNP   2 mg at 03/03/23 0122    ondansetron [facility]) injection 4 mg  4 mg IntraVENous Q4H PRN Amber Sand, APRN - CNP   4 mg at 23    prochlorperazine (COMPAZINE) injection 10 mg  10 mg IntraVENous Q6H PRN Amber Sand, APRN - CNP   10 mg at 23 0122    diatrizoate meglumine-sodium (GASTROGRAFIN) 66-10 % solution 15 mL  15 mL Oral ONCE PRN Amber Sand, APRN - CNP   15 mL at 23 1502    PN-Adult Premix 4.25/10 - Central Line   IntraVENous Continuous TPN Padmini Titus MD 85 mL/hr at 23 1800 New Bag at 23 1800    potassium chloride 20 mEq/50 mL IVPB (Central Line)  20 mEq IntraVENous PRN Padmini Titus MD        Or    potassium chloride 10 mEq/100 mL IVPB (Peripheral Line)  10 mEq IntraVENous PRN Padmini Titus MD        magnesium sulfate 2000 mg in 50 mL IVPB premix  2,000 mg IntraVENous PRN Padmini Titus MD        sodium phosphate 10 mmol in sodium chloride 0.9 % 250 mL IVPB  10 mmol IntraVENous PRN Padmini Titus MD        Or    sodium phosphate 15 mmol in sodium chloride 0.9 % 250 mL IVPB  15 mmol IntraVENous PRN Padmini Titus MD        Or    sodium phosphate 20 mmol in sodium chloride 0.9 % 500 mL IVPB  20 mmol IntraVENous PRN Padmini Titus MD           OBJECTIVE:  Patient Vitals for the past 8 hrs:   BP Temp Temp src Pulse Resp SpO2   23 0726 119/86 97.8 °F (36.6 °C) Oral 70 16 94 %   23 0251 103/74 98.2 °F (36.8 °C) Oral 71 17 94 %     Temp (24hrs), Av.1 °F (36.7 °C), Min:97.8 °F (36.6 °C), Max:98.3 °F (36.8 °C)    No intake/output data recorded. Physical Exam:  Constitutional: Well developed female in no acute distress, lying comfortably in the hospital bed. HEENT: Normocephalic and atraumatic. Oropharynx is clear, mucous membranes are moist.  Extraocular muscles are intact. Sclerae anicteric. Neck supple without JVD. No thyromegaly present. Skin Warm and dry. No bruising and no rash noted. No erythema. No pallor.     Respiratory Lungs are clear to auscultation bilaterally without wheezes, rales or rhonchi, normal air exchange without accessory muscle use. CVS Normal rate, regular rhythm and normal S1 and S2. No murmurs, gallops, or rubs. Abdomen Soft, nontender and distended, normoactive bowel sounds. No palpable mass. No hepatosplenomegaly. Neuro Grossly nonfocal with no obvious sensory or motor deficits. MSK Normal range of motion in general.  No edema and no tenderness. Psych Appropriate mood and affect.         Labs:    Recent Results (from the past 24 hour(s))   Urinalysis w rflx microscopic    Collection Time: 03/02/23  8:16 PM   Result Value Ref Range    Color, UA YELLOW/STRAW      Appearance CLEAR      Specific Gravity, UA >1.035 (H) 1.001 - 1.023    pH, Urine 7.0 5.0 - 9.0      Protein, UA 30 (A) NEG mg/dL    Glucose, UA Negative mg/dL    Ketones, Urine Negative NEG mg/dL    Bilirubin Urine Negative NEG      Blood, Urine LARGE (A) NEG      Urobilinogen, Urine 1.0 0.2 - 1.0 EU/dL    Nitrite, Urine Negative NEG      Leukocyte Esterase, Urine SMALL (A) NEG      WBC, UA 20-50 (A) U4 /hpf    RBC, UA >100 (A) U5 /hpf    Epithelial Cells UA 0-5 U5 /hpf    BACTERIA, URINE Negative NEG /hpf    Casts 0-2 U2 /lpf   Comprehensive Metabolic Panel    Collection Time: 03/03/23  3:14 AM   Result Value Ref Range    Sodium 131 (L) 133 - 143 mmol/L    Potassium 4.1 3.5 - 5.1 mmol/L    Chloride 101 101 - 110 mmol/L    CO2 26 21 - 32 mmol/L    Anion Gap 4 2 - 11 mmol/L    Glucose 112 (H) 65 - 100 mg/dL    BUN 20 6 - 23 MG/DL    Creatinine 0.40 (L) 0.6 - 1.0 MG/DL    Est, Glom Filt Rate >60 >60 ml/min/1.73m2    Calcium 8.4 8.3 - 10.4 MG/DL    Total Bilirubin 0.3 0.2 - 1.1 MG/DL    ALT 13 12 - 65 U/L    AST 12 (L) 15 - 37 U/L    Alk Phosphatase 75 50 - 136 U/L    Total Protein 5.8 (L) 6.3 - 8.2 g/dL    Albumin 2.4 (L) 3.5 - 5.0 g/dL    Globulin 3.4 2.8 - 4.5 g/dL    Albumin/Globulin Ratio 0.7 0.4 - 1.6     Magnesium    Collection Time: 03/03/23  3:14 AM   Result Value Ref Range    Magnesium 2.1 1.8 - 2.4 mg/dL   CBC with Auto Differential    Collection Time: 03/03/23  3:14 AM   Result Value Ref Range    WBC 4.5 4.3 - 11.1 K/uL    RBC 2.67 (L) 4.05 - 5.2 M/uL    Hemoglobin 6.8 (LL) 11.7 - 15.4 g/dL    Hematocrit 23.2 (L) 35.8 - 46.3 %    MCV 86.9 82 - 102 FL    MCH 25.5 (L) 26.1 - 32.9 PG    MCHC 29.3 (L) 31.4 - 35.0 g/dL    RDW 21.0 (H) 11.9 - 14.6 %    Platelets 301 157 - 778 K/uL    MPV 10.7 9.4 - 12.3 FL    nRBC 0.00 0.0 - 0.2 K/uL    Differential Type AUTOMATED      Seg Neutrophils 57 43 - 78 %    Lymphocytes 28 13 - 44 %    Monocytes 15 (H) 4.0 - 12.0 %    Eosinophils % 0 (L) 0.5 - 7.8 %    Basophils 0 0.0 - 2.0 %    Immature Granulocytes 0 0.0 - 5.0 %    Segs Absolute 2.6 1.7 - 8.2 K/UL    Absolute Lymph # 1.3 0.5 - 4.6 K/UL    Absolute Mono # 0.7 0.1 - 1.3 K/UL    Absolute Eos # 0.0 0.0 - 0.8 K/UL    Basophils Absolute 0.0 0.0 - 0.2 K/UL    Absolute Immature Granulocyte 0.0 0.0 - 0.5 K/UL   Phosphorus    Collection Time: 03/03/23  3:14 AM   Result Value Ref Range    Phosphorus 3.5 2.5 - 4.5 MG/DL   Triglyceride    Collection Time: 03/03/23  3:14 AM   Result Value Ref Range    Triglycerides 100 35 - 150 MG/DL   TYPE AND SCREEN    Collection Time: 03/03/23  5:29 AM   Result Value Ref Range    Crossmatch expiration date 03/06/2023,2884     ABO/Rh A POSITIVE     Antibody Screen NEG     Unit Number [id number]     Product Code Blood Bank RC LRIR     Unit Divison 00     Dispense Status Blood Bank ALLOCATED     Crossmatch Result Compatible    PREPARE RBC (CROSSMATCH), 1 Units    Collection Time: 03/03/23  5:30 AM   Result Value Ref Range    History Check Historical check performed        Imaging:  CT Result (most recent):  CT ABDOMEN PELVIS W IV CONTRAST 03/02/2023    Narrative  EXAM: CT OF THE ABDOMEN AND PELVIS WITH IV CONTRAST    INDICATIONS: Nausea, vomiting, abdominal pain    TECHNIQUE: CT scan of the abdomen and pelvis was performed following the  administration of intravenous contrast. CT dose lowering techniques were used,  to include: automated exposure control, adjustment for patient size, and / or  use of iterative reconstruction. COMPARISON: 12/21/2022    FINDINGS:  Bones: No destructive osseous lesions. Lung bases: Unremarkable    ABDOMEN:  Liver: There is scalloping of the hepatic capsule by the adjacent fluid. The  portal venous system is patent. Gallbladder and Bile Ducts: There is wall thickening of the gallbladder. There  is no intrahepatic or extrahepatic biliary ductal dilatation. Spleen: A hypoattenuating structure in the posterior spleen is too small to  characterize. Pancreas: The pancreatic parenchyma is unremarkable. There is no pancreatic  ductal dilatation. Adrenals: Unremarkable without nodularity. Kidneys: A right-sided double-J nephroureteral stent is present and appears  appropriately positioned. An extrarenal pelvis is present on the right. There  are some patchy regions of hypoenhancement in the right kidney. No focal lesion  is demonstrated. Vasculature: No evidence of atherosclerosis or aneurysm. Nodes: There is no lymphadenopathy by size criteria. Bowel: There is no bowel obstruction. Enteric contrast reaches the splenic  flexure. Status post sleeve gastrectomy. No extraluminal contrast is seen. The  appendix is unremarkable. There is wall thickening of the colon along its  length. Mesentery/Peritoneum: There is a small to moderate volume of abdominopelvic free  fluid. There is associated enhancement of the peritoneal surfaces. A right  lower quadrant approach drain terminates in the eyad hepatis. Soft Tissues: Unremarkable    PELVIS:  Pelvic Organs: Status post hysterectomy. There is wall thickening of the urinary  bladder. Impression  1. Small to moderate volume of abdominopelvic free fluid. There is associated  enhancement of the peritoneal surfaces. This may represent peritonitis and/or  carcinomatosis.   2. Wall thickening of the gallbladder. This is highly nonspecific and may be  reactive to the fluid. If there is clinical suspicion for cystic duct  obstruction, CT would be recommended. 3. Some patchy regions of hypoenhancement in the right kidney, compatible with  acute pyelonephritis. 4. Wall thickening of the urinary bladder. Please correlate with urinalysis. 5. Wall thickening of the colon along its length. This may be related to  relative underdistention or colitis. 6. Additional chronic findings as above. Alexx Mixon M.D.  3/2/2023 8:41:00 PM      ASSESSMENT:  Principal Problem:    Intractable nausea and vomiting  Resolved Problems:    * No resolved hospital problems. *      Patient presents for evaluation prior to receiving cycle 6-day 1 of Taxol/Cyramza. She visited ED on 2/20/2023 for intractable abdominal pain, nausea and vomiting. Vomitus is bilious and recurrent associated with retching and worsening of abdominal pain. She cannot tolerate oral medications and is dependent on TPN for her nutritional needs. She feels very weak and finds it difficult to speak. Denies fevers, chills, chest pain or cough. Denies diarrhea or abdominal distention. .    Since last visit patient's clinical status has significantly declined. She has intractable nausea, bilious vomiting and abdominal pain rated at 8 out of 10 today. I am concerned that with administration of chemotherapy her symptoms might worsen and therefor D1C6 ramucirumab + taxol will be held today. She will be given IV fluid and intravenous antiemetics in the chair today. We shall attempt to admit her directly to the hospital for expedited abdominal imaging and supportive management. Return office visit will be scheAduled according to her discharge. Patient/mother verbalized understanding of the above and are in agreement with the management plan. PLAN:  Metastatic carcinoma of unknown primary  - s/p C5D15 Cyramza/Taxol on 2/16.   C6 due 3/2 - held d/t pt condition    Intractable abd pain / N/V / ? Peritonitis / Colitis / Acute Pyelonephritis  - Check CT AP  3/3 CT AP with sm-mod volume of abdominopelvic free fluid ?peritonitis and/or carcinomatosis; wall thickening of GB; acute pyelonephritis; wall thickening of urinary bladder; and colitis. Check Bcx/Ucx. Start CTX/Flagyl. Nutrition  - Consult RD to con't TPN    Anemia secondary to chemotherapy  - Tranfuse prn per Andres SOPs  3/3 Transfuse 1 unit PRBCs    Constipation  3/3 Start pericolace BID    Continue home meds  Andres SOPs  Lovenox for DVT ppx    Goals and plan of care reviewed with the patient. All questions answered to the best of our ability. Disposition:  Anticipate discharge home once symptoms controlled.               SCOTT Tejeda  Hematology & Oncology  [address] [address]  Office : [phone]  Fax : [phone]

## 2023-03-03 NOTE — PROGRESS NOTES
Received briefing by staff this morning.  follow-up is planned.      Duncan Loyola 68  Board Certified

## 2023-03-03 NOTE — CARE COORDINATION
MSN, CM:  patient to receive 1 unit PRBC today r/t Hgb at 6.8. Patient is current with [address] and would like to continue this upon discharge. Patent also receives TPN which is current with [facility] and will resume upon discharge. Case Management will continue to follow.

## 2023-03-03 NOTE — PLAN OF CARE
Problem: Pain  Goal: Verbalizes/displays adequate comfort level or baseline comfort level  3/3/2023 0134 by Abril Adair RN  Outcome: Progressing  3/3/2023 0134 by Abril Adair RN  Outcome: Progressing

## 2023-03-03 NOTE — PROGRESS NOTES
Comprehensive Nutrition Assessment    Type and Reason for Visit: Reassess  TPN Management (Oncology)    Nutrition Recommendations/Plan:   Parenteral Nutrition:  Central parenteral nutrition  begins at 1800 today  Advance: Dex 10% , 4.25% AA 2.28 L (95ml/hr)   Initiate 250 ml 20% lipids daily   To provide: 1663 kcal/d (100% of needs), 97 grams of protein/d (100% of needs), 228 (5.8 mg/kg/min) grams of CHO/d and ~2530 ml of total volume/d. Initiate 12 hr cyclic infusion: 400 ml/hr from 5129-1033, 205 ml/hr from 3977-2619, 115 ml/hr 8104-5500, then off   Lytes/L:   Sodium ( 130 meq NaCl), Potassium ( 15 meq KCl) Phosphorus ( 10 mmol KPO4), Calcium (4.5 meq), Magnesium 8 meq   Other additives:   MVI Monday Wednesday Friday due to [employer], MTE  Nutrition Related Medication Management:  Electrolyte Replacement Protocol PRN Potassium, Phosphorus, and Magnesium   Labs:   CMP daily per MD  Mg daily per MD  Phos daily x 3 days at initiation then MWF    Triglyceride tomorrow  POC Glucoses/SSI Not indicated  Meals and Snacks:  Diet: Continue current order  Nutrition Supplement Therapy:  Medical food supplement therapy:  Continue Ensure Clear three times per day (this provides 240 kcal and 8 grams protein per bottle)     Malnutrition Assessment:  Malnutrition Status: Insufficient data (previous severe malnutrition, NFPE deferred thi date, has been gaining weight on home PN)    No amie wasting  Nutrition Assessment:  Nutrition History: Patient known to RD. Eating normally 3 meals per day prior to 9/2022 when she presented with N/V and abdominal distention. She was discharged on a regular diet with Ensure Clear. She presented back 10/2022 with N/V and was eventually started on TPN and discharged on TPN. She remains TPN for primary needs. Weight has been trending up per outpatient office visits. Followed by outpatient RD and RD with [facility]. 3/3: Spoke with [facility] LINDSEY.  Patient is on 12 hr cyclic infusion. PN volume 2.3L/d (85 g pro, 50 g fat, 220 g CHO). She states that she has been stable on 130 meq/l Na. 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 She presented with abdominal pain, difficulty swallowing \"stuck in throat and vomits. \" She was supposed to have outpt EGD but presented to ER with intractable nausea and vomiting. She is now s/p EGD 10/21 with findings of LA grade D esophagitis, proximal esophageal stricture, Sckatzki's ring. She presented for chemo. She was a direct admit for intractable N/V and worsening abdominal pain. Nutrition Interval:  TPN without lipids initiated 3/2 via port. CTAP with contrast 3/2 showed enhancement of peritoneal surfaces that may represent peritonitis and/or carcinomatosis. Patient seen reclined in bed with RN at bedside. She asks about the white bag (lipids) and also if she can be changed to cyclic infusion per what she does at home. She states that she has been very nauseous this am and has not attempted to eat anything. Discussed plan for cyclic PN this evening with Kelli Benitez RN.   Abdominal Status (last documented):    , GI Symptoms: Reduction in appetite   Pertinent Medications: Protonix, rocephin, flagyl, senokot  Continuous: none  IVF: none  Electrolyte Replacement:  none thus far  PRN: Zofran (last admin 3/2), Glycolax, compazine (last admin 3/3)  Pertinent Labs:   Lab Results   Component Value Date/Time     03/03/2023 03:14 AM    K 4.1 03/03/2023 03:14 AM     03/03/2023 03:14 AM    CO2 26 03/03/2023 03:14 AM    BUN 20 03/03/2023 03:14 AM    CREATININE 0.40 03/03/2023 03:14 AM    GLUCOSE 112 03/03/2023 03:14 AM    CALCIUM 8.4 03/03/2023 03:14 AM PHOS 3.5 03/03/2023 03:14 AM    MG 2.1 03/03/2023 03:14 AM     Lab Results   Component Value Date/Time    TRIG 100 03/03/2023 03:14 AM    TRIG 326 10/26/2022 03:12 AM    TRIG 148 10/25/2022 04:00 AM     Remarkable for: Na depleted, Cl improved, mildly elevated glucose. All other lytes stable TG appropriate to initiate lipids. Current Nutrition Therapies:  ADULT DIET; Clear Liquid  PN-Adult Premix 4.25/10 - Central Line  ADULT ORAL NUTRITION SUPPLEMENT; Breakfast, Lunch, Dinner; Clear Liquid Oral Supplement  Current Parenteral Nutrition Orders:  Type and Formula: Premix Central (Dex 10% , 4.25% AA)   Lipids: None  Duration: Continuous  Rate/Volume: 2 L (85ml/hr)  Current PN Order Provides: 1020 kcal/d (64% of needs), 85 grams of protein/d (100% of needs), 200 grams of CHO/d and ~2000 ml of total volume/d. Goal PN Orders Provides:      Current Intake:   Average Meal Intake: Unable to assess Average Supplements Intake: None Ordered      Anthropometric Measures:  Height: 5' 4\" (162.6 cm)  Current Body Wt: 117 lb 15.1 oz (53.5 kg) (3/2), Weight source: Standing Scale  BMI: 20.2, Normal Weight (BMI 18.5-24. 9)  Admission Body Weight: 117 lb 8.1 oz (53.3 kg) (3/2 office PTA)  Ideal Body Weight (Kg) (Calculated): 55 kg (120 lbs), 97.9 %  Usual Body Wt: 126 lb (57.2 kg) (3/16/22 office visit), Percent weight change: -6.7       BMI Category Normal Weight (BMI 18.5-24. 9)  Estimated Daily Nutrient Needs:  Energy (kcal/day): 8365-2913 (30-35 kcal/kg) (Kcal/kg Weight used: 53.3 kg Current  Protein (g/day): 64-75 (1.2-1.4 g/kg) Weight Used: (Current) 53.3 kg  Fluid (ml/day):   (1 ml/kcal)    Nutrition Diagnosis:   Inadequate oral intake related to altered GI function as evidenced by  (Gastric cancer with peritoneal carcinomatosis, TPN dependant at baseline)    Nutrition Interventions:   Food and/or Nutrient Delivery: Continue Current Diet, Continue Oral Nutrition Supplement, Modify Parenteral Nutrition     Coordination of Nutrition Care: Continue to monitor while inpatient       Goals:   Previous Goal Met: Progressing toward Goal(s)  Active Goal: Tolerate nutrition support at goal rate, within 2 days       Nutrition Monitoring and Evaluation:      Food/Nutrient Intake Outcomes: Food and Nutrient Intake, Supplement Intake, Parenteral Nutrition Intake/Tolerance  Physical Signs/Symptoms Outcomes: Biochemical Data, GI Status, Nausea or Vomiting, Fluid Status or Edema, Weight    Discharge Planning:    Parenteral Nutrition    Mariano Rizvi

## 2023-03-03 NOTE — PROGRESS NOTES
Nutrition F/U:  Assessment:  Pt seen during office visit w/ Dr. Maki Rodríguez, having intractable pain and N/V - plan for admission to [facility]. Pt remains TPN dependent for majority of her estimated nutrition needs, managed by [facility] and infusing over 12 hrs/day. Nutrition related lab values WNL. Current BW: 117#, up 3# over the past month. Intervention:  1. Continue w/ po diet as tolerated  2. TPN per [facility]  3. Admission to [facility] today - may need EGD/dilation repeated     Monitoring/Evaluation:  1. RD to follow up during next office visit - follow up wt status, tolerance/intake of po diet, symptom management.        3 [facility], [address], [address] [facility]

## 2023-03-04 LAB
ABO + RH BLD: NORMAL
ALBUMIN SERPL-MCNC: 2.1 G/DL (ref 3.5–5)
ALBUMIN/GLOB SERPL: 0.7 (ref 0.4–1.6)
ALP SERPL-CCNC: 69 U/L (ref 50–136)
ALT SERPL-CCNC: 12 U/L (ref 12–65)
ANION GAP SERPL CALC-SCNC: 2 MMOL/L (ref 2–11)
AST SERPL-CCNC: 11 U/L (ref 15–37)
BASOPHILS # BLD: 0 K/UL (ref 0–0.2)
BASOPHILS NFR BLD: 0 % (ref 0–2)
BILIRUB SERPL-MCNC: 0.3 MG/DL (ref 0.2–1.1)
BLD PROD TYP BPU: NORMAL
BLOOD BANK CMNT PATIENT-IMP: NORMAL
BLOOD BANK DISPENSE STATUS: NORMAL
BLOOD GROUP ANTIBODIES SERPL: NORMAL
BPU ID: NORMAL
BUN SERPL-MCNC: 25 MG/DL (ref 6–23)
CALCIUM SERPL-MCNC: 8.1 MG/DL (ref 8.3–10.4)
CHLORIDE SERPL-SCNC: 108 MMOL/L (ref 101–110)
CO2 SERPL-SCNC: 25 MMOL/L (ref 21–32)
CREAT SERPL-MCNC: 0.4 MG/DL (ref 0.6–1)
CROSSMATCH RESULT: NORMAL
DIFFERENTIAL METHOD BLD: ABNORMAL
EOSINOPHIL # BLD: 0 K/UL (ref 0–0.8)
EOSINOPHIL NFR BLD: 1 % (ref 0.5–7.8)
ERYTHROCYTE [DISTWIDTH] IN BLOOD BY AUTOMATED COUNT: 19.2 % (ref 11.9–14.6)
GLOBULIN SER CALC-MCNC: 3.2 G/DL (ref 2.8–4.5)
GLUCOSE SERPL-MCNC: 105 MG/DL (ref 65–100)
HCT VFR BLD AUTO: 26.5 % (ref 35.8–46.3)
HGB BLD-MCNC: 7.9 G/DL (ref 11.7–15.4)
IMM GRANULOCYTES # BLD AUTO: 0 K/UL (ref 0–0.5)
IMM GRANULOCYTES NFR BLD AUTO: 0 % (ref 0–5)
LYMPHOCYTES # BLD: 1.2 K/UL (ref 0.5–4.6)
LYMPHOCYTES NFR BLD: 26 % (ref 13–44)
MAGNESIUM SERPL-MCNC: 2.1 MG/DL (ref 1.8–2.4)
MCH RBC QN AUTO: 25.7 PG (ref 26.1–32.9)
MCHC RBC AUTO-ENTMCNC: 29.8 G/DL (ref 31.4–35)
MCV RBC AUTO: 86.3 FL (ref 82–102)
MONOCYTES # BLD: 0.7 K/UL (ref 0.1–1.3)
MONOCYTES NFR BLD: 15 % (ref 4–12)
NEUTS SEG # BLD: 2.6 K/UL (ref 1.7–8.2)
NEUTS SEG NFR BLD: 58 % (ref 43–78)
NRBC # BLD: 0.03 K/UL (ref 0–0.2)
PHOSPHATE SERPL-MCNC: 3.9 MG/DL (ref 2.5–4.5)
PLATELET # BLD AUTO: 256 K/UL (ref 150–450)
PMV BLD AUTO: 9.5 FL (ref 9.4–12.3)
POTASSIUM SERPL-SCNC: 4.2 MMOL/L (ref 3.5–5.1)
PROT SERPL-MCNC: 5.3 G/DL (ref 6.3–8.2)
RBC # BLD AUTO: 3.07 M/UL (ref 4.05–5.2)
SODIUM SERPL-SCNC: 135 MMOL/L (ref 133–143)
SPECIMEN EXP DATE BLD: NORMAL
TRIGL SERPL-MCNC: 69 MG/DL (ref 35–150)
UNIT DIVISION: 0
WBC # BLD AUTO: 4.5 K/UL (ref 4.3–11.1)

## 2023-03-04 PROCEDURE — 1100000000 HC RM PRIVATE

## 2023-03-04 PROCEDURE — 2580000003 HC RX 258: Performed by: NURSE PRACTITIONER

## 2023-03-04 PROCEDURE — 84478 ASSAY OF TRIGLYCERIDES: CPT

## 2023-03-04 PROCEDURE — C9113 INJ PANTOPRAZOLE SODIUM, VIA: HCPCS | Performed by: NURSE PRACTITIONER

## 2023-03-04 PROCEDURE — 2500000003 HC RX 250 WO HCPCS: Performed by: INTERNAL MEDICINE

## 2023-03-04 PROCEDURE — 84100 ASSAY OF PHOSPHORUS: CPT

## 2023-03-04 PROCEDURE — 99232 SBSQ HOSP IP/OBS MODERATE 35: CPT | Performed by: INTERNAL MEDICINE

## 2023-03-04 PROCEDURE — 6360000002 HC RX W HCPCS: Performed by: NURSE PRACTITIONER

## 2023-03-04 PROCEDURE — 80053 COMPREHEN METABOLIC PANEL: CPT

## 2023-03-04 PROCEDURE — 6360000002 HC RX W HCPCS: Performed by: INTERNAL MEDICINE

## 2023-03-04 PROCEDURE — 6370000000 HC RX 637 (ALT 250 FOR IP): Performed by: NURSE PRACTITIONER

## 2023-03-04 PROCEDURE — 85025 COMPLETE CBC W/AUTO DIFF WBC: CPT

## 2023-03-04 PROCEDURE — 83735 ASSAY OF MAGNESIUM: CPT

## 2023-03-04 PROCEDURE — A4216 STERILE WATER/SALINE, 10 ML: HCPCS | Performed by: NURSE PRACTITIONER

## 2023-03-04 PROCEDURE — 36591 DRAW BLOOD OFF VENOUS DEVICE: CPT

## 2023-03-04 PROCEDURE — 2500000003 HC RX 250 WO HCPCS: Performed by: NURSE PRACTITIONER

## 2023-03-04 PROCEDURE — 2580000003 HC RX 258: Performed by: INTERNAL MEDICINE

## 2023-03-04 RX ADMIN — ENOXAPARIN SODIUM 40 MG: 100 INJECTION SUBCUTANEOUS at 17:46

## 2023-03-04 RX ADMIN — METRONIDAZOLE 500 MG: 500 INJECTION, SOLUTION INTRAVENOUS at 10:02

## 2023-03-04 RX ADMIN — MORPHINE SULFATE 2 MG: 2 INJECTION, SOLUTION INTRAMUSCULAR; INTRAVENOUS at 09:19

## 2023-03-04 RX ADMIN — SOYBEAN OIL 250 ML: 20 INJECTION, SOLUTION INTRAVENOUS at 17:39

## 2023-03-04 RX ADMIN — CEFTRIAXONE SODIUM 2000 MG: 2 INJECTION, POWDER, FOR SOLUTION INTRAMUSCULAR; INTRAVENOUS at 09:14

## 2023-03-04 RX ADMIN — METRONIDAZOLE 500 MG: 500 INJECTION, SOLUTION INTRAVENOUS at 15:32

## 2023-03-04 RX ADMIN — SODIUM CHLORIDE, PRESERVATIVE FREE 40 MG: 5 INJECTION INTRAVENOUS at 09:11

## 2023-03-04 RX ADMIN — SENNOSIDES AND DOCUSATE SODIUM 1 TABLET: 8.6; 5 TABLET ORAL at 09:17

## 2023-03-04 RX ADMIN — POTASSIUM PHOSPHATE, MONOBASIC POTASSIUM PHOSPHATE, DIBASIC: 224; 236 INJECTION, SOLUTION, CONCENTRATE INTRAVENOUS at 17:42

## 2023-03-04 RX ADMIN — MORPHINE SULFATE 2 MG: 2 INJECTION, SOLUTION INTRAMUSCULAR; INTRAVENOUS at 15:40

## 2023-03-04 RX ADMIN — SENNOSIDES AND DOCUSATE SODIUM 1 TABLET: 8.6; 5 TABLET ORAL at 21:14

## 2023-03-04 RX ADMIN — PROCHLORPERAZINE EDISYLATE 10 MG: 5 INJECTION INTRAMUSCULAR; INTRAVENOUS at 15:30

## 2023-03-04 RX ADMIN — METRONIDAZOLE 500 MG: 500 INJECTION, SOLUTION INTRAVENOUS at 00:44

## 2023-03-04 RX ADMIN — SODIUM CHLORIDE, PRESERVATIVE FREE 40 MG: 5 INJECTION INTRAVENOUS at 21:14

## 2023-03-04 ASSESSMENT — PAIN SCALES - GENERAL
PAINLEVEL_OUTOF10: 0
PAINLEVEL_OUTOF10: 7
PAINLEVEL_OUTOF10: 0
PAINLEVEL_OUTOF10: 7
PAINLEVEL_OUTOF10: 0

## 2023-03-04 ASSESSMENT — PAIN DESCRIPTION - LOCATION
LOCATION: ABDOMEN
LOCATION: ABDOMEN

## 2023-03-04 ASSESSMENT — PAIN DESCRIPTION - ORIENTATION
ORIENTATION: MID;LOWER
ORIENTATION: MID;LOWER

## 2023-03-04 ASSESSMENT — PAIN DESCRIPTION - DESCRIPTORS
DESCRIPTORS: ACHING
DESCRIPTORS: ACHING

## 2023-03-04 ASSESSMENT — PAIN DESCRIPTION - FREQUENCY
FREQUENCY: INTERMITTENT
FREQUENCY: INTERMITTENT

## 2023-03-04 ASSESSMENT — PAIN DESCRIPTION - PAIN TYPE
TYPE: DEEP SOMATIC PAIN
TYPE: DEEP SOMATIC PAIN

## 2023-03-04 ASSESSMENT — PAIN DESCRIPTION - ONSET
ONSET: ON-GOING
ONSET: GRADUAL

## 2023-03-04 NOTE — PROGRESS NOTES
Comprehensive Nutrition Assessment    Type and Reason for Visit: Reassess  TPN Management (Oncology)    Nutrition Recommendations/Plan:   Parenteral Nutrition:  Central parenteral nutrition  begins at 1800 today  Advance: Dex 10% , 4.25% AA 2.28 L   Initiate 250 ml 20% lipids daily   To provide: 1663 kcal/d (100% of needs), 97 grams of protein/d (100% of needs), 228 (5.8 mg/kg/min) grams of CHO/d and ~2530 ml of total volume/d. Initiate 12 hr cyclic infusion: 980 ml/hr from 6351-4971, 205 ml/hr from 4944-8798, 115 ml/hr 8084-6832, then off   Lytes/L: No changes to lytes today  Sodium ( 130 meq NaCl), Potassium ( 15 meq KCl) Phosphorus ( 10 mmol KPO4), Calcium (4.5 meq), Magnesium 8 meq   Other additives:   MVI Monday Wednesday Friday due to [employer], MTE  Nutrition Related Medication Management:  Electrolyte Replacement Protocol PRN Potassium, Phosphorus, and Magnesium   Labs:   CMP daily per MD  Mg daily per MD  Phos daily x 3 days at initiation then MWF    Triglyceride tomorrow  POC Glucoses/SSI Not indicated  Meals and Snacks:  Diet: Continue current order  Nutrition Supplement Therapy:  Medical food supplement therapy:  Continue Ensure Clear three times per day (this provides 240 kcal and 8 grams protein per bottle)     Malnutrition Assessment:  Malnutrition Status: Insufficient data (previous severe malnutrition, NFPE deferred thi date, has been gaining weight on home PN)    No amie wasting  Nutrition Assessment:  Nutrition History: Patient known to RD. Eating normally 3 meals per day prior to 9/2022 when she presented with N/V and abdominal distention. She was discharged on a regular diet with Ensure Clear. She presented back 10/2022 with N/V and was eventually started on TPN and discharged on TPN. She remains TPN for primary needs. Weight has been trending up per outpatient office visits. Followed by outpatient RD and RD with [facility]. 3/3: Spoke with [facility], Sarah Collins.  Patient is on 12 hr cyclic infusion. PN volume 2.3L/d (85 g pro, 50 g fat, 220 g CHO). She states that she has been stable on 130 meq/l Na. 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 She presented with abdominal pain, difficulty swallowing \"stuck in throat and vomits. \" She was supposed to have outpt EGD but presented to ER with intractable nausea and vomiting. She is now s/p EGD 10/21 with findings of LA grade D esophagitis, proximal esophageal stricture, Sckatzki's ring. She presented for chemo. She was a direct admit for intractable N/V and worsening abdominal pain. Nutrition Interval:  TPN without lipids initiated 3/2 via port. CTAP with contrast 3/2 showed enhancement of peritoneal surfaces that may represent peritonitis and/or carcinomatosis. TPN transitioned to 12 hr cyclic infusion per home regimen 3/3. Patient seen sitting up in bed. She states that she has been tolerating sips of water today but has not tried Ensure Clear yet. Offered her ice which she accepted. No needs or questions.    Discussed with Shelly Allen RN    Abdominal Status (last documented):   Last BM (including prior to admit):  (pt states last week), GI Symptoms: Reduction in appetite, Nausea, Hiccuping   Pertinent Medications: Protonix, rocephin, flagyl, senokot  Continuous: none  IVF: none  Electrolyte Replacement:  none thus far  PRN: Zofran (last admin 3/2), Glycolax, compazine (last admin 3/3)  Pertinent Labs:   Lab Results   Component Value Date/Time     03/04/2023 03:32 AM    K 4.2 03/04/2023 03:32 AM     03/04/2023 03:32 AM    CO2 25 03/04/2023 03:32 AM    BUN 25 03/04/2023 03:32 AM    CREATININE 0.40 03/04/2023 03:32 AM    GLUCOSE 105 03/04/2023 03:32 AM    CALCIUM 8.1 03/04/2023 03:32 AM    PHOS 3.9 03/04/2023 03:32 AM    MG 2.1 03/04/2023 03:32 AM     Lab Results   Component Value Date/Time    TRIG 69 03/04/2023 03:32 AM    TRIG 100 03/03/2023 03:14 AM    TRIG 326 10/26/2022 03:12 AM     Remarkable for: Na WNL, Cl improved, mildly elevated glucose. All other lytes stable. TG appropriate to initiate lipids. Current Nutrition Therapies:  ADULT DIET; Clear Liquid  ADULT ORAL NUTRITION SUPPLEMENT; Breakfast, Lunch, Dinner; Clear Liquid Oral Supplement  PN-Adult Premix 4.25/10 - Central Line  PN-Adult Premix 4.25/10 - Central Line  Current Parenteral Nutrition Orders:  Type and Formula: Premix Central (Dex 10% , 4.25% AA)   Lipids: None  Duration: Cyclic (12 hr)  Rate/Volume: 115 ml/hr from 0355-4914, 205 ml/hr from 3333-2916, 115 ml/hr 0646-0082, then off  Current PN Order Provides: 1163 arturo/d (100% of needs), 97 grams of protein/d (100% of needs), 228 (5.8 mg/kg/min) grams of CHO/d and ~2530 ml of total volume/d. Goal PN Orders Provides:      Current Intake:   Average Meal Intake: Unable to assess Average Supplements Intake: None Ordered      Anthropometric Measures:  Height: 5' 4\" (162.6 cm)  Current Body Wt: 118 lb 13.3 oz (53.9 kg) (3/3), Weight source: Standing Scale  BMI: 20.4, Normal Weight (BMI 18.5-24. 9)  Admission Body Weight: 117 lb 8.1 oz (53.3 kg) (3/2 office PTA)  Ideal Body Weight (Kg) (Calculated): 55 kg (120 lbs), 97.9 %  Usual Body Wt: 126 lb (57.2 kg) (3/16/22 office visit), Percent weight change: -6.7       BMI Category Normal Weight (BMI 18.5-24. 9)  Estimated Daily Nutrient Needs:  Energy (kcal/day): 9562-1022 (30-35 kcal/kg) (Kcal/kg Weight used: 53.3 kg Current  Protein (g/day): 80-96 (1.5-1.8g/kg) Weight Used: (Current) 53.3 kg  Fluid (ml/day):   (1 ml/kcal)    Nutrition Diagnosis:   Inadequate oral intake related to altered GI function as evidenced by  (Gastric cancer with peritoneal carcinomatosis, TPN dependant at baseline)    Nutrition Interventions:   Food and/or Nutrient Delivery: Continue Current Diet, Continue Oral Nutrition Supplement, Modify Parenteral Nutrition     Coordination of Nutrition Care: Continue to monitor while inpatient       Goals:   Previous Goal Met: Progressing toward Goal(s)  Active Goal: Tolerate nutrition support at goal rate, within 2 days       Nutrition Monitoring and Evaluation:      Food/Nutrient Intake Outcomes: Food and Nutrient Intake, Supplement Intake, Parenteral Nutrition Intake/Tolerance  Physical Signs/Symptoms Outcomes: Biochemical Data, GI Status, Nausea or Vomiting, Fluid Status or Edema, Weight    Discharge Planning:    Parenteral Nutrition    Mariano Rizvi

## 2023-03-04 NOTE — PROGRESS NOTES
[facility] Hematology & Oncology        Inpatient Hematology / Oncology Progress Note    Reason for Admission:  Intractable nausea and vomiting [R11.2]    24 Hour Events:  Afebrile, VSS  Pain and nausea are improving  Hgb 7.9 s/p 1 unit  UC/BC-NGTD      Transfusions: 1 unit PRBCs  Replacements: None        ROS:  Constitutional: Negative for fever, chills. CV: Negative for chest pain, palpitations, edema. Respiratory: Negative for dyspnea, cough, wheezing. GI: +abd pain, N/V, constipation    10 point review of systems is otherwise negative with the exception of the elements mentioned above in the HPI.        No Known Allergies  Past Medical History:   Diagnosis Date    Alteration in nutrition     per RD note TPN dependent 12 hours per day- [facility]    Anemia     Colon cancer ([address] dx 2022    followed by dr Sommer Cheng    COVID-19 2020    no hospitalization    GERD (gastroesophageal reflux disease)     managed with med    History of blood transfusion     History of colonoscopy 2018    Dr. Clemens, nl (see media note), R     Hx of blood clots 2022    per pt \"small clot on lung identified by CT scan\"  CT Scan impression:- Nonobstructive pulmonary filling defect involving Left Lower Lobe     Hypotension     asymptomatic    Obstruction of fallopian tube     per pt has \"1 good tube\"    Peritoneal carcinomatosis ([address]     chemo; abdominal pleurx    Weight loss     80lbs weight loss after gastric sleeve     Past Surgical History:   Procedure Laterality Date     SECTION      CYSTOSCOPY Bilateral 2022    CYSTOSCOPY BILATERAL RETROGRADE PYELOGRAM performed by Gregg Torres MD at [address] Bilateral 2022    BILATERAL CYSTOSCOPY URETERAL STENT EXCHANGE performed by Gregg Torres MD at [address] Bilateral 10/6/2022    CYSTOSCOPY BILATERAL URETERAL STENT REMOVAL, RIGHT URETERAL STENT PLACEMENT performed by Gregg Torres MD at [facility] OR CYSTOSCOPY Bilateral 1/26/2023    CYSTOSCOPY / BILATERAL RETROGRADE / RIGHT URETERAL STENT EXCHANGE performed by Torey Chong MD at [facility] MAIN OR    GASTRIC BYPASS SURGERY  07/23/2014    gastric sleeve- [place]    HYSTERECTOMY (CERVIX STATUS UNKNOWN)      2018    HYSTERECTOMY (CERVIX STATUS UNKNOWN)  07/09/2020    TLH w/ Bilateral salpingectomy and left oophorectomy    IR PERC CATH PLEURAL DRAIN W/IMAG  9/26/2022    IR PERC CATH PLEURAL DRAIN W/IMAG 9/26/2022 [facility] SPE[facility]    IR PORT PLACEMENT EQUAL OR GREATER THAN 5 YEARS  2/28/2022    IR PORT PLACEMENT EQUAL OR GREATER THAN 5 YEARS  2/28/2022    IR PORT PLACEMENT EQUAL OR GREATER THAN 5 YEARS 2/28/2022 [facility] SPECI[facility]    LAPAROTOMY N/A 8/17/2022    EXPLORATORY LAPAROTOMY/ ENTEROENTEROSTOMY performed by Beti Young MD at [facility]. [address]  age Odilia Emma 21s\"    also \"unblocked her FT\"    TONSILLECTOMY      UPPER GASTROINTESTINAL ENDOSCOPY      with dilation    UPPER GASTROINTESTINAL ENDOSCOPY N/A 9/13/2022    EGD ESOPHAGOGASTRODUODENOSCOPY OFL 17 To IR after recovery for Para performed by Aishwarya Alexis MD at [facility] ENDOSCOPY    UPPER GASTROINTESTINAL ENDOSCOPY N/A 10/21/2022    EGD DILATION BALLOON performed by Warren Matthew MD at [facility] ENDOSCOPY    UROLOGICAL SURGERY Bilateral 03/15/2022    cysto     Family History   Problem Relation Age of Onset    Heart Disease Maternal Grandmother     Hypertension Maternal Grandmother     Heart Disease Maternal Grandfather     Hypertension Maternal Grandfather     Pulmonary Embolism Neg Hx     Colon Cancer Neg Hx     Deep Vein Thrombosis Neg Hx     Ovarian Cancer Neg Hx     Prostate Cancer Neg Hx     Breast Cancer Neg Hx      Social History     Socioeconomic History    Marital status: Single     Spouse name: Not on file    Number of children: Not on file    Years of education: Not on file    Highest education level: Not on file   Occupational History    Not on file   Tobacco Use    Smoking status: Never    Smokeless tobacco: Never   Vaping Use    Vaping Use: Never used   Substance and Sexual Activity    Alcohol use: Not Currently    Drug use: No    Sexual activity: Not on file   Other Topics Concern    Not on file   Social History Narrative    1. Use large speculum. 2.  sister, Christine Farias #[id number] and mom is Leona Santana #[id number] (both Kofoed's pts)  3.   PCP  Dr. Kathie Berrios ([facility]), GI Dr. Onelia Tavarez Bello-2018 normal EGD     Social Determinants of Health     Financial Resource Strain: Not on file   Food Insecurity: Not on file   Transportation Needs: Not on file   Physical Activity: Not on file   Stress: Not on file   Social Connections: Not on file   Intimate Partner Violence: Not on file   Housing Stability: Not on file     Current Facility-Administered Medications   Medication Dose Route Frequency Provider Last Rate Last Admin    cefTRIAXone (ROCEPHIN) 2,000 mg in sodium chloride 0.9 % 50 mL IVPB (mini-bag)  2,000 mg IntraVENous Q24H Mcgee Plan, APRN -  mL/hr at 03/04/23 0914 2,000 mg at 03/04/23 0914    metronidazole (FLAGYL) 500 mg in 0.9% NaCl 100 mL IVPB premix  500 mg IntraVENous Q8H Mcgee Plan, APRN - CNP   Stopped at 03/04/23 0144    sennosides-docusate sodium (SENOKOT-S) 8.6-50 MG tablet 1 tablet  1 tablet Oral BID Mcgee Plan, APRN - CNP   1 tablet at 03/04/23 0917    diphenhydrAMINE (BENADRYL) capsule 25 mg  25 mg Oral Q6H PRN Maico James MD   25 mg at 03/03/23 1245    PN-Adult Premix 4.25/10 - Central Line   IntraVENous Continuous TPN Maico James MD 1 mL/hr at 03/03/23 1816 New Bag at 03/03/23 1816    lidocaine-prilocaine (EMLA) cream   Topical PRN Mcgee Plan, APRN - CNP        pantoprazole (PROTONIX) 40 mg in sodium chloride (PF) 0.9 % 10 mL injection  40 mg IntraVENous BID Mcgee Plan, APRN - CNP   40 mg at 03/04/23 0911    enoxaparin (LOVENOX) injection 40 mg  40 mg SubCUTAneous Daily Mcgee Plan, APRN - CNP   40 mg at 03/03/23 1941    polyethylene glycol (GLYCOLAX) packet 17 g  17 g Oral Daily PRN SCOTT Bryan - CNP        acetaminophen (TYLENOL) tablet 650 mg  650 mg Oral Q6H PRN SCOTT Bryan - CNP   650 mg at 23 1245    HYDROcodone-acetaminophen (NORCO) 5-325 MG per tablet 1 tablet  1 tablet Oral Q6H PRN SCOTT Bryan - CNP        morphine injection 2 mg  2 mg IntraVENous Q4H PRN SCOTT Bryan - CNP   2 mg at 23 0919    ondansetron (ZOFRAN) injection 4 mg  4 mg IntraVENous Q4H PRN SCOTT Bryan - CNP   4 mg at 23    prochlorperazine (COMPAZINE) injection 10 mg  10 mg IntraVENous Q6H PRN SCOTT Bryan - CNP   10 mg at 23 182    diatrizoate meglumine-sodium (GASTROGRAFIN) 66-10 % solution 15 mL  15 mL Oral ONCE PRN SCOTT Bryan CNP   15 mL at 23 1502    potassium chloride 20 mEq/50 mL IVPB (Central Line)  20 mEq IntraVENous PRN Jamel King MD        Or    potassium chloride 10 mEq/100 mL IVPB (Peripheral Line)  10 mEq IntraVENous PRN Jamel King MD        magnesium sulfate 2000 mg in 50 mL IVPB premix  2,000 mg IntraVENous PRN Jamel King MD        sodium phosphate 10 mmol in sodium chloride 0.9 % 250 mL IVPB  10 mmol IntraVENous PRN Jamel King MD        Or    sodium phosphate 15 mmol in sodium chloride 0.9 % 250 mL IVPB  15 mmol IntraVENous PRN Jamel King MD        Or    sodium phosphate 20 mmol in sodium chloride 0.9 % 500 mL IVPB  20 mmol IntraVENous PRN Jamel King MD           OBJECTIVE:  Patient Vitals for the past 8 hrs:   BP Temp Temp src Pulse Resp SpO2   23 0713 106/79 97.9 °F (36.6 °C) Oral 86 18 97 %   23 0344 (!) 94/59 98.6 °F (37 °C) Oral 84 18 98 %     Temp (24hrs), Av.3 °F (36.8 °C), Min:97.7 °F (36.5 °C), Max:98.8 °F (37.1 °C)    No intake/output data recorded. Physical Exam:  Constitutional: Well developed female in no acute distress, lying comfortably in the hospital bed. HEENT: Normocephalic and atraumatic. Sclerae anicteric.  Neck supple without JVD. No thyromegaly present. Skin Warm and dry. No bruising and no rash noted. No erythema. No pallor. Respiratory Lungs are clear to auscultation bilaterally without wheezes, rales or rhonchi, normal air exchange without accessory muscle use. CVS Normal rate, regular rhythm and normal S1 and S2. No murmurs, gallops, or rubs. Abdomen Mildly firm and distended distended, normoactive bowel sounds. Neuro Grossly nonfocal with no obvious sensory or motor deficits. MSK Normal range of motion in general.  +bLE edema and no tenderness. Psych Appropriate mood and affect.         Labs:    Recent Results (from the past 24 hour(s))   Culture, Urine    Collection Time: 03/03/23 10:06 AM    Specimen: Urine, clean catch    URINE   Result Value Ref Range    Special Requests NO SPECIAL REQUESTS      Culture        50,000-100,000 COLONIES/mL MIXED SKIN MARIA ESTHER ISOLATED    Culture CULTURE IN [facility] UPDATES TO FOLLOW     Comprehensive Metabolic Panel    Collection Time: 03/04/23  3:32 AM   Result Value Ref Range    Sodium 135 133 - 143 mmol/L    Potassium 4.2 3.5 - 5.1 mmol/L    Chloride 108 101 - 110 mmol/L    CO2 25 21 - 32 mmol/L    Anion Gap 2 2 - 11 mmol/L    Glucose 105 (H) 65 - 100 mg/dL    BUN 25 (H) 6 - 23 MG/DL    Creatinine 0.40 (L) 0.6 - 1.0 MG/DL    Est, Glom Filt Rate >60 >60 ml/min/1.73m2    Calcium 8.1 (L) 8.3 - 10.4 MG/DL    Total Bilirubin 0.3 0.2 - 1.1 MG/DL    ALT 12 12 - 65 U/L    AST 11 (L) 15 - 37 U/L    Alk Phosphatase 69 50 - 136 U/L    Total Protein 5.3 (L) 6.3 - 8.2 g/dL    Albumin 2.1 (L) 3.5 - 5.0 g/dL    Globulin 3.2 2.8 - 4.5 g/dL    Albumin/Globulin Ratio 0.7 0.4 - 1.6     Magnesium    Collection Time: 03/04/23  3:32 AM   Result Value Ref Range    Magnesium 2.1 1.8 - 2.4 mg/dL   CBC with Auto Differential    Collection Time: 03/04/23  3:32 AM   Result Value Ref Range    WBC 4.5 4.3 - 11.1 K/uL    RBC 3.07 (L) 4.05 - 5.2 M/uL    Hemoglobin 7.9 (L) 11.7 - 15.4 g/dL    Hematocrit 26.5 (L) 35.8 - 46.3 %    MCV 86.3 82 - 102 FL    MCH 25.7 (L) 26.1 - 32.9 PG    MCHC 29.8 (L) 31.4 - 35.0 g/dL    RDW 19.2 (H) 11.9 - 14.6 %    Platelets 391 771 - 305 K/uL    MPV 9.5 9.4 - 12.3 FL    nRBC 0.03 0.0 - 0.2 K/uL    Differential Type AUTOMATED      Seg Neutrophils 58 43 - 78 %    Lymphocytes 26 13 - 44 %    Monocytes 15 (H) 4.0 - 12.0 %    Eosinophils % 1 0.5 - 7.8 %    Basophils 0 0.0 - 2.0 %    Immature Granulocytes 0 0.0 - 5.0 %    Segs Absolute 2.6 1.7 - 8.2 K/UL    Absolute Lymph # 1.2 0.5 - 4.6 K/UL    Absolute Mono # 0.7 0.1 - 1.3 K/UL    Absolute Eos # 0.0 0.0 - 0.8 K/UL    Basophils Absolute 0.0 0.0 - 0.2 K/UL    Absolute Immature Granulocyte 0.0 0.0 - 0.5 K/UL   Phosphorus    Collection Time: 03/04/23  3:32 AM   Result Value Ref Range    Phosphorus 3.9 2.5 - 4.5 MG/DL   Triglyceride    Collection Time: 03/04/23  3:32 AM   Result Value Ref Range    Triglycerides 69 35 - 150 MG/DL       Imaging:  CT Result (most recent):  CT ABDOMEN PELVIS W IV CONTRAST 03/02/2023    Narrative  EXAM: CT OF THE ABDOMEN AND PELVIS WITH IV CONTRAST    INDICATIONS: Nausea, vomiting, abdominal pain    TECHNIQUE: CT scan of the abdomen and pelvis was performed following the  administration of intravenous contrast. CT dose lowering techniques were used,  to include: automated exposure control, adjustment for patient size, and / or  use of iterative reconstruction. COMPARISON: 12/21/2022    FINDINGS:  Bones: No destructive osseous lesions. Lung bases: Unremarkable    ABDOMEN:  Liver: There is scalloping of the hepatic capsule by the adjacent fluid. The  portal venous system is patent. Gallbladder and Bile Ducts: There is wall thickening of the gallbladder. There  is no intrahepatic or extrahepatic biliary ductal dilatation. Spleen: A hypoattenuating structure in the posterior spleen is too small to  characterize. Pancreas: The pancreatic parenchyma is unremarkable.  There is no pancreatic  ductal dilatation. Adrenals: Unremarkable without nodularity. Kidneys: A right-sided double-J nephroureteral stent is present and appears  appropriately positioned. An extrarenal pelvis is present on the right. There  are some patchy regions of hypoenhancement in the right kidney. No focal lesion  is demonstrated. Vasculature: No evidence of atherosclerosis or aneurysm. Nodes: There is no lymphadenopathy by size criteria. Bowel: There is no bowel obstruction. Enteric contrast reaches the splenic  flexure. Status post sleeve gastrectomy. No extraluminal contrast is seen. The  appendix is unremarkable. There is wall thickening of the colon along its  length. Mesentery/Peritoneum: There is a small to moderate volume of abdominopelvic free  fluid. There is associated enhancement of the peritoneal surfaces. A right  lower quadrant approach drain terminates in the eyad hepatis. Soft Tissues: Unremarkable    PELVIS:  Pelvic Organs: Status post hysterectomy. There is wall thickening of the urinary  bladder. Impression  1. Small to moderate volume of abdominopelvic free fluid. There is associated  enhancement of the peritoneal surfaces. This may represent peritonitis and/or  carcinomatosis. 2. Wall thickening of the gallbladder. This is highly nonspecific and may be  reactive to the fluid. If there is clinical suspicion for cystic duct  obstruction, CT would be recommended. 3. Some patchy regions of hypoenhancement in the right kidney, compatible with  acute pyelonephritis. 4. Wall thickening of the urinary bladder. Please correlate with urinalysis. 5. Wall thickening of the colon along its length. This may be related to  relative underdistention or colitis. 6. Additional chronic findings as above. Jc Abreu M.D.  3/2/2023 8:41:00 PM      ASSESSMENT:  Principal Problem:    Intractable nausea and vomiting  Resolved Problems:    * No resolved hospital problems.  *      Patient presents for evaluation prior to receiving cycle 6-day 1 of Taxol/Cyramza. She visited ED on 2/20/2023 for intractable abdominal pain, nausea and vomiting. Vomitus is bilious and recurrent associated with retching and worsening of abdominal pain. She cannot tolerate oral medications and is dependent on TPN for her nutritional needs. She feels very weak and finds it difficult to speak. Denies fevers, chills, chest pain or cough. Denies diarrhea or abdominal distention. .    Since last visit patient's clinical status has significantly declined. She has intractable nausea, bilious vomiting and abdominal pain rated at 8 out of 10 today. I am concerned that with administration of chemotherapy her symptoms might worsen and therefor D1C6 ramucirumab + taxol will be held today. She will be given IV fluid and intravenous antiemetics in the chair today. We shall attempt to admit her directly to the hospital for expedited abdominal imaging and supportive management. Return office visit will be scheAduled according to her discharge. Patient/mother verbalized understanding of the above and are in agreement with the management plan. PLAN:  Metastatic carcinoma of unknown primary  - s/p C5D15 Cyramza/Taxol on 2/16. C6 due 3/2 - held d/t pt condition    Intractable abd pain / N/V / ? Peritonitis / Colitis / Acute Pyelonephritis  - Check CT AP  3/3 CT AP with sm-mod volume of abdominopelvic free fluid ?peritonitis and/or carcinomatosis; wall thickening of GB; acute pyelonephritis; wall thickening of urinary bladder; and colitis. Check Bcx/Ucx. Start CTX/Flagyl.    3/4 Afeb, nausea and pain are improving, BC/UC -NGTD, continue CTX/Flagyl, OK to drain PleurX prn    Nutrition  - Consult RD to con't TPN    Anemia secondary to chemotherapy  - Tranfuse prn per Andres SOPs  3/3 Transfuse 1 unit PRBCs  3/4 Hgb 7.9    Constipation  3/3 Start pericolace BID    Continue home meds  Andres SOPs  Lovenox for DVT ppx    Goals and plan of care reviewed with the patient. All questions answered to the best of our ability. Disposition:  Anticipate discharge home once symptoms controlled.               SCOTT [facility] Hematology & Oncology  [id number] [place] [address]  Office : [phone]  Fax : [phone]

## 2023-03-04 NOTE — PROGRESS NOTES
Pt was awake and alert    No distress noted    No family present      Pt was very receptive to  visit      Provided supportive presence    Acknowledged her illness and struggles      Pt demonstrated a Nondenominational arlette           asked pt \" what can we pray for?\"    Pt replied \" my illness\"        Prayer provided      Reflected upon her journey and shared a story of arlette in the furnace of affliction. Pt appeared \"moved by story\"      Pt would benefit from regular visits from chaplains during her stay. THANKS TO OUR STAFF FOR THEIR COMPASSIONATE CARE.

## 2023-03-05 LAB
ALBUMIN SERPL-MCNC: 2.1 G/DL (ref 3.5–5)
ALBUMIN/GLOB SERPL: 0.7 (ref 0.4–1.6)
ALP SERPL-CCNC: 72 U/L (ref 50–136)
ALT SERPL-CCNC: 12 U/L (ref 12–65)
ANION GAP SERPL CALC-SCNC: 4 MMOL/L (ref 2–11)
AST SERPL-CCNC: 8 U/L (ref 15–37)
BACTERIA SPEC CULT: ABNORMAL
BASOPHILS # BLD: 0 K/UL (ref 0–0.2)
BASOPHILS NFR BLD: 0 % (ref 0–2)
BILIRUB SERPL-MCNC: 0.4 MG/DL (ref 0.2–1.1)
BUN SERPL-MCNC: 26 MG/DL (ref 6–23)
CALCIUM SERPL-MCNC: 8.2 MG/DL (ref 8.3–10.4)
CHLORIDE SERPL-SCNC: 112 MMOL/L (ref 101–110)
CO2 SERPL-SCNC: 23 MMOL/L (ref 21–32)
CREAT SERPL-MCNC: 0.4 MG/DL (ref 0.6–1)
DIFFERENTIAL METHOD BLD: ABNORMAL
EOSINOPHIL # BLD: 0.1 K/UL (ref 0–0.8)
EOSINOPHIL NFR BLD: 1 % (ref 0.5–7.8)
ERYTHROCYTE [DISTWIDTH] IN BLOOD BY AUTOMATED COUNT: 19.4 % (ref 11.9–14.6)
GLOBULIN SER CALC-MCNC: 3 G/DL (ref 2.8–4.5)
GLUCOSE SERPL-MCNC: 111 MG/DL (ref 65–100)
HCT VFR BLD AUTO: 25.2 % (ref 35.8–46.3)
HGB BLD-MCNC: 7.6 G/DL (ref 11.7–15.4)
IMM GRANULOCYTES # BLD AUTO: 0 K/UL (ref 0–0.5)
IMM GRANULOCYTES NFR BLD AUTO: 1 % (ref 0–5)
LYMPHOCYTES # BLD: 1.2 K/UL (ref 0.5–4.6)
LYMPHOCYTES NFR BLD: 21 % (ref 13–44)
MAGNESIUM SERPL-MCNC: 1.8 MG/DL (ref 1.8–2.4)
MCH RBC QN AUTO: 26.5 PG (ref 26.1–32.9)
MCHC RBC AUTO-ENTMCNC: 30.2 G/DL (ref 31.4–35)
MCV RBC AUTO: 87.8 FL (ref 82–102)
MONOCYTES # BLD: 0.9 K/UL (ref 0.1–1.3)
MONOCYTES NFR BLD: 15 % (ref 4–12)
NEUTS SEG # BLD: 3.6 K/UL (ref 1.7–8.2)
NEUTS SEG NFR BLD: 62 % (ref 43–78)
NRBC # BLD: 0.04 K/UL (ref 0–0.2)
PHOSPHATE SERPL-MCNC: 4.7 MG/DL (ref 2.5–4.5)
PLATELET # BLD AUTO: 282 K/UL (ref 150–450)
PMV BLD AUTO: 10.2 FL (ref 9.4–12.3)
POTASSIUM SERPL-SCNC: 4.3 MMOL/L (ref 3.5–5.1)
PROT SERPL-MCNC: 5.1 G/DL (ref 6.3–8.2)
RBC # BLD AUTO: 2.87 M/UL (ref 4.05–5.2)
SERVICE CMNT-IMP: ABNORMAL
SODIUM SERPL-SCNC: 139 MMOL/L (ref 133–143)
TRIGL SERPL-MCNC: 95 MG/DL (ref 35–150)
WBC # BLD AUTO: 5.8 K/UL (ref 4.3–11.1)

## 2023-03-05 PROCEDURE — 6360000002 HC RX W HCPCS: Performed by: NURSE PRACTITIONER

## 2023-03-05 PROCEDURE — 2580000003 HC RX 258: Performed by: NURSE PRACTITIONER

## 2023-03-05 PROCEDURE — 2500000003 HC RX 250 WO HCPCS: Performed by: NURSE PRACTITIONER

## 2023-03-05 PROCEDURE — 84478 ASSAY OF TRIGLYCERIDES: CPT

## 2023-03-05 PROCEDURE — 2580000003 HC RX 258: Performed by: INTERNAL MEDICINE

## 2023-03-05 PROCEDURE — C9113 INJ PANTOPRAZOLE SODIUM, VIA: HCPCS | Performed by: NURSE PRACTITIONER

## 2023-03-05 PROCEDURE — 6370000000 HC RX 637 (ALT 250 FOR IP): Performed by: NURSE PRACTITIONER

## 2023-03-05 PROCEDURE — 2500000003 HC RX 250 WO HCPCS: Performed by: INTERNAL MEDICINE

## 2023-03-05 PROCEDURE — 99232 SBSQ HOSP IP/OBS MODERATE 35: CPT | Performed by: INTERNAL MEDICINE

## 2023-03-05 PROCEDURE — 84100 ASSAY OF PHOSPHORUS: CPT

## 2023-03-05 PROCEDURE — 83735 ASSAY OF MAGNESIUM: CPT

## 2023-03-05 PROCEDURE — 85025 COMPLETE CBC W/AUTO DIFF WBC: CPT

## 2023-03-05 PROCEDURE — 80053 COMPREHEN METABOLIC PANEL: CPT

## 2023-03-05 PROCEDURE — A4216 STERILE WATER/SALINE, 10 ML: HCPCS | Performed by: NURSE PRACTITIONER

## 2023-03-05 PROCEDURE — 6360000002 HC RX W HCPCS: Performed by: INTERNAL MEDICINE

## 2023-03-05 PROCEDURE — 1100000000 HC RM PRIVATE

## 2023-03-05 RX ADMIN — PROCHLORPERAZINE EDISYLATE 10 MG: 5 INJECTION INTRAMUSCULAR; INTRAVENOUS at 04:08

## 2023-03-05 RX ADMIN — SOYBEAN OIL 250 ML: 20 INJECTION, SOLUTION INTRAVENOUS at 17:51

## 2023-03-05 RX ADMIN — SODIUM CHLORIDE, PRESERVATIVE FREE 40 MG: 5 INJECTION INTRAVENOUS at 09:45

## 2023-03-05 RX ADMIN — METRONIDAZOLE 500 MG: 500 INJECTION, SOLUTION INTRAVENOUS at 09:45

## 2023-03-05 RX ADMIN — MORPHINE SULFATE 2 MG: 2 INJECTION, SOLUTION INTRAMUSCULAR; INTRAVENOUS at 17:49

## 2023-03-05 RX ADMIN — PROCHLORPERAZINE EDISYLATE 10 MG: 5 INJECTION INTRAMUSCULAR; INTRAVENOUS at 17:36

## 2023-03-05 RX ADMIN — ENOXAPARIN SODIUM 40 MG: 100 INJECTION SUBCUTANEOUS at 16:23

## 2023-03-05 RX ADMIN — SENNOSIDES AND DOCUSATE SODIUM 1 TABLET: 8.6; 5 TABLET ORAL at 09:45

## 2023-03-05 RX ADMIN — SODIUM CHLORIDE, PRESERVATIVE FREE 40 MG: 5 INJECTION INTRAVENOUS at 20:59

## 2023-03-05 RX ADMIN — METRONIDAZOLE 500 MG: 500 INJECTION, SOLUTION INTRAVENOUS at 16:23

## 2023-03-05 RX ADMIN — POTASSIUM CHLORIDE: 2 INJECTION, SOLUTION, CONCENTRATE INTRAVENOUS at 17:50

## 2023-03-05 RX ADMIN — CEFTRIAXONE SODIUM 2000 MG: 2 INJECTION, POWDER, FOR SOLUTION INTRAMUSCULAR; INTRAVENOUS at 09:45

## 2023-03-05 RX ADMIN — METRONIDAZOLE 500 MG: 500 INJECTION, SOLUTION INTRAVENOUS at 00:14

## 2023-03-05 RX ADMIN — MORPHINE SULFATE 2 MG: 2 INJECTION, SOLUTION INTRAMUSCULAR; INTRAVENOUS at 04:02

## 2023-03-05 ASSESSMENT — PAIN SCALES - GENERAL
PAINLEVEL_OUTOF10: 9
PAINLEVEL_OUTOF10: 5
PAINLEVEL_OUTOF10: 10
PAINLEVEL_OUTOF10: 0

## 2023-03-05 ASSESSMENT — PAIN DESCRIPTION - LOCATION: LOCATION: GENERALIZED

## 2023-03-05 NOTE — PROGRESS NOTES
Comprehensive Nutrition Assessment    Type and Reason for Visit: Reassess  TPN Management (Oncology)    Nutrition Recommendations/Plan:   Parenteral Nutrition:  Central parenteral nutrition  begins at 1800 today  Advance: Dex 10% , 4.25% AA 2.28 L   Initiate 250 ml 20% lipids daily   To provide: 1663 kcal/d (100% of needs), 97 grams of protein/d (100% of needs), 228 (5.8 mg/kg/min) grams of CHO/d and ~2530 ml of total volume/d. Continue 12 hr cyclic infusion: 779 ml/hr from 9007-2034, 205 ml/hr from 3771-8201, 115 ml/hr 2259-7060, then off   Lytes/L:   Sodium ( 130 meq NaCl), Potassium ( 15 meq KCl) Phosphorus ( 5 mmol KPO4), Calcium (4.5 meq), Magnesium 10 meq   Other additives:   MVI Monday Wednesday Friday due to [employer], MTE  Nutrition Related Medication Management:  Electrolyte Replacement Protocol PRN Potassium, Phosphorus, and Magnesium   Labs:   CMP daily per MD  Mg daily per MD Mendoza MWF    Triglyceride weekly on Tuesday  POC Glucoses/SSI Not indicated  Meals and Snacks:  Diet: Continue current order  Nutrition Supplement Therapy:  Medical food supplement therapy:  Continue Ensure Clear three times per day (this provides 240 kcal and 8 grams protein per bottle)     Malnutrition Assessment:  Malnutrition Status: Insufficient data (previous severe malnutrition, NFPE deferred thi date, has been gaining weight on home PN)    No amie wasting  Nutrition Assessment:  Nutrition History: Patient known to RD. Eating normally 3 meals per day prior to 9/2022 when she presented with N/V and abdominal distention. She was discharged on a regular diet with Ensure Clear. She presented back 10/2022 with N/V and was eventually started on TPN and discharged on TPN. She remains TPN for primary needs. Weight has been trending up per outpatient office visits. Followed by outpatient RD and RD with [facility]. 3/3: Spoke with [facility] RD, Arnold Leventhal. Patient is on 12 hr cyclic infusion.  PN volume 2.3L/d (85 g pro, 50 g fat, 220 g CHO). She states that she has been stable on 130 meq/l Na. Do You Have Any Cultural, Protestant, or Ethnic Food Preferences?: No   Nutrition Background:       Patient with PMH significant for gastric sleeve, hydronephrosis with stents and recent right stent exchange (left unable to be exchanged due to tumor), metastatic adeno with suspected GI origin with peritoneal carcinomatosis on diagnosis, debulking unsuccessful, recent GIB, DVT, Pleurx placement 9/26/22. She presented with abdominal pain, difficulty swallowing \"stuck in throat and vomits. \" She was supposed to have outpt EGD but presented to ER with intractable nausea and vomiting. She is now s/p EGD 10/21 with findings of LA grade D esophagitis, proximal esophageal stricture, Sckatzki's ring. She presented for chemo. She was a direct admit for intractable N/V and worsening abdominal pain. Nutrition Interval:  TPN without lipids initiated 3/2 via port. CTAP with contrast 3/2 showed enhancement of peritoneal surfaces that may represent peritonitis and/or carcinomatosis. TPN transitioned to 12 hr cyclic infusion per home regimen 3/3. Patient seen sitting up in bed. Pt states she has been sipping on fluids throughout the day. Denies any further nausea. Discussed pt with RN Jim Hoover. Plan to decrease phosphorus today d/t hyperphosphatemia and increase Mg d/t Mg downtrending.      Abdominal Status (last documented):   Last BM (including prior to admit):  (pt states last week), GI Symptoms: Reduction in appetite, Nausea, Hiccuping   Pertinent Medications: Protonix, rocephin, flagyl, senokot  Continuous: none  IVF: none  Electrolyte Replacement:  none thus far  PRN: Zofran (last admin 3/2), Glycolax, compazine (last admin 3/5)  Pertinent Labs:   Lab Results   Component Value Date/Time     03/05/2023 03:57 AM    K 4.3 03/05/2023 03:57 AM     03/05/2023 03:57 AM    CO2 23 03/05/2023 03:57 AM    BUN 26 03/05/2023 03:57 AM    CREATININE 0.40 03/05/2023 03:57 AM    GLUCOSE 111 03/05/2023 03:57 AM    CALCIUM 8.2 03/05/2023 03:57 AM    PHOS 4.7 03/05/2023 03:57 AM    MG 1.8 03/05/2023 03:57 AM     Lab Results   Component Value Date/Time    TRIG 95 03/05/2023 03:54 AM    TRIG 69 03/04/2023 03:32 AM    TRIG 100 03/03/2023 03:14 AM     Remarkable for:   Na WNL  K stable  CL trending up with Na  Magnesium down slightly, will increase in TPN today  Hyperphosphatemia - decreased KPO4 in TPN today, likely bowel component as well    Current Nutrition Therapies:  ADULT DIET; Clear Liquid  ADULT ORAL NUTRITION SUPPLEMENT; Breakfast, Lunch, Dinner; Clear Liquid Oral Supplement  PN-Adult Premix 4.25/10 - Central Line  Current Parenteral Nutrition Orders:  Type and Formula: Premix Central (Dex 10% , 4.25% AA)   Lipids: None  Duration: Cyclic (12 hr)  Rate/Volume: 115 ml/hr from 3864-0596, 205 ml/hr from 2512-0206, 115 ml/hr 8619-0486, then off  Current PN Order Provides: 1163 arturo/d (100% of needs), 97 grams of protein/d (100% of needs), 228 (5.8 mg/kg/min) grams of CHO/d and ~2530 ml of total volume/d. Goal PN Orders Provides:      Current Intake:   Average Meal Intake: 1-25% (sipping on liquids throughout the day) Average Supplements Intake: None Ordered      Anthropometric Measures:  Height: 5' 4\" (162.6 cm)  Current Body Wt: 118 lb 13.3 oz (53.9 kg) (3/3), Weight source: Standing Scale  BMI: 20.4, Normal Weight (BMI 18.5-24. 9)  Admission Body Weight: 117 lb 8.1 oz (53.3 kg) (3/2 office PTA)  Ideal Body Weight (Kg) (Calculated): 55 kg (120 lbs), 97.9 %  Usual Body Wt: 126 lb (57.2 kg) (3/16/22 office visit), Percent weight change: -6.7       BMI Category Normal Weight (BMI 18.5-24. 9)    Estimated Daily Nutrient Needs:  Energy (kcal/day): 2104-5171 (30-35 kcal/kg) (Kcal/kg Weight used: 53.3 kg Current  Protein (g/day): 80-96 (1.5-1.8g/kg) Weight Used: (Current) 53.3 kg  Fluid (ml/day):   (1 ml/kcal)    Nutrition Diagnosis:   Inadequate oral intake related to altered GI function as evidenced by  (Gastric cancer with peritoneal carcinomatosis, TPN dependant at baseline)    Nutrition Interventions:   Food and/or Nutrient Delivery: Continue Current Diet, Modify Parenteral Nutrition     Coordination of Nutrition Care: Continue to monitor while inpatient       Goals:   Previous Goal Met: Goal(s) Achieved  Active Goal: Tolerate nutrition support at goal rate, within 2 days       Nutrition Monitoring and Evaluation:      Food/Nutrient Intake Outcomes: Food and Nutrient Intake, Supplement Intake, Parenteral Nutrition Intake/Tolerance  Physical Signs/Symptoms Outcomes: Biochemical Data, GI Status, Weight, Fluid Status or Edema    Discharge Planning:    Parenteral Nutrition    Angel Giraldo, [address] [place] [address]

## 2023-03-05 NOTE — PROGRESS NOTES
[employer] [facility]        Inpatient Hematology / Oncology Progress Note    Reason for Admission:  Intractable nausea and vomiting [R11.2]    24 Hour Events:  Afebrile, VSS  Pain and nausea are improving  Hgb 7.6  UC/BC-NGTD      Transfusions: 1 unit PRBCs  Replacements: None        ROS:  Constitutional: Negative for fever, chills. CV: Negative for chest pain, palpitations, edema. Respiratory: Negative for dyspnea, cough, wheezing. GI: +abd pain, N/V, constipation    10 point review of systems is otherwise negative with the exception of the elements mentioned above in the HPI.        No Known Allergies  Past Medical History:   Diagnosis Date    Alteration in nutrition     per RD note TPN dependent 12 hours per day- [facility]    Anemia     Colon cancer ([facility] [address] dx 2022    followed by dr John Ovalles    COVID-19 2020    no hospitalization    GERD (gastroesophageal reflux disease)     managed with med    History of blood transfusion     History of colonoscopy 2018    Dr. Casa Quintero, nl (see media note), R     Hx of blood clots 2022    per pt \"small clot on lung identified by CT scan\"  CT Scan impression:- Nonobstructive pulmonary filling defect involving Left Lower Lobe     Hypotension     asymptomatic    Obstruction of fallopian tube     per pt has \"1 good tube\"    Peritoneal carcinomatosis ([facility] [address]     chemo; abdominal pleurx    Weight loss     80lbs weight loss after gastric sleeve     Past Surgical History:   Procedure Laterality Date     SECTION      CYSTOSCOPY Bilateral 2022    CYSTOSCOPY BILATERAL RETROGRADE PYELOGRAM performed by Omid Peters MD at [address] Bilateral 2022    BILATERAL CYSTOSCOPY URETERAL STENT EXCHANGE performed by Omid Peters MD at [address] Bilateral 10/6/2022    CYSTOSCOPY BILATERAL URETERAL STENT REMOVAL, RIGHT URETERAL [address] [facility] [address],[facility] [place], [place] [address] performed by Omid Peters MD at [facility] Bilateral 1/26/2023    CYSTOSCOPY / BILATERAL RETROGRADE / RIGHT URETERAL STENT EXCHANGE performed by Arely Meza MD at [facility] MAIN OR    GASTRIC BYPASS SURGERY  07/23/2014    gastric sleeve- Choudhari    HYSTERECTOMY (CERVIX STATUS UNKNOWN)      2018    HYSTERECTOMY (CERVIX STATUS UNKNOWN)  07/09/2020    [facility] w/ Bilateral salpingectomy and left oophorectomy    IR PERC CATH PLEURAL DRAIN W/IMAG  9/26/2022    IR PERC CATH PLEURAL DRAIN W/IMAG 9/26/2022 [facility]    IR PORT PLACEMENT EQUAL OR GREATER THAN 5 YEARS  2/28/2022    IR PORT PLACEMENT EQUAL OR GREATER THAN 5 YEARS  2/28/2022    IR PORT PLACEMENT EQUAL OR GREATER THAN 5 YEARS 2/28/2022 [facility]    LAPAROTOMY N/A 8/17/2022    EXPLORATORY LAPAROTOMY/ ENTEROENTEROSTOMY performed by José Miguel Valladares MD at [facility]. [address]  age Lolly Furl 21s\"    also \"unblocked her FT\"    TONSILLECTOMY      UPPER GASTROINTESTINAL ENDOSCOPY      with dilation    UPPER GASTROINTESTINAL ENDOSCOPY N/A 9/13/2022    EGD ESOPHAGOGASTRODUODENOSCOPY OFL 17 To IR after recovery for Para performed by Lulú Dunbar MD at [facility] ENDOSCOPY    UPPER GASTROINTESTINAL ENDOSCOPY N/A 10/21/2022    EGD DILATION BALLOON performed by Ayo Garcia MD at [facility] ENDOSCOPY    UROLOGICAL SURGERY Bilateral 03/15/2022    cysto     Family History   Problem Relation Age of Onset    Heart Disease Maternal Grandmother     Hypertension Maternal Grandmother     Heart Disease Maternal Grandfather     Hypertension Maternal Grandfather     Pulmonary Embolism Neg Hx     Colon Cancer Neg Hx     Deep Vein Thrombosis Neg Hx     Ovarian Cancer Neg Hx     Prostate Cancer Neg Hx     Breast Cancer Neg Hx      Social History     Socioeconomic History    Marital status: Single     Spouse name: Not on file    Number of children: Not on file    Years of education: Not on file    Highest education level: Not on file   Occupational History    Not on file   Tobacco Use    Smoking status: Never Smokeless tobacco: Never   Vaping Use    Vaping Use: Never used   Substance and Sexual Activity    Alcohol use: Not Currently    Drug use: No    Sexual activity: Not on file   Other Topics Concern    Not on file   Social History Narrative    1. Use large speculum. 2.  sister, Aziza Judge #[id number] and mom is Pam Smith #[id number] (both Kofoed's pts)  3.   PCP  Dr. Anisha Mitchell ([facility]), GI Dr. Willa Closs Perkins-2018 normal EGD     Social Determinants of Health     Financial Resource Strain: Not on file   Food Insecurity: Not on file   Transportation Needs: Not on file   Physical Activity: Not on file   Stress: Not on file   Social Connections: Not on file   Intimate Partner Violence: Not on file   Housing Stability: Not on file     Current Facility-Administered Medications   Medication Dose Route Frequency Provider Last Rate Last Admin    fat emulsion 20 % infusion 250 mL  250 mL IntraVENous Daily David Suarez MD   Stopped at 03/05/23 8104    PN-Adult Premix 4.25/10 - Central Line   IntraVENous Continuous TPN David Suarez MD   Stopped at 03/05/23 0615    cefTRIAXone (ROCEPHIN) 2,000 mg in sodium chloride 0.9 % 50 mL IVPB (mini-bag)  2,000 mg IntraVENous Q24H SCOTT Chapman  mL/hr at 03/05/23 0945 2,000 mg at 03/05/23 0945    metronidazole (FLAGYL) 500 mg in 0.9% NaCl 100 mL IVPB premix  500 mg IntraVENous Q8H SCOTT Chapman  mL/hr at 03/05/23 0945 500 mg at 03/05/23 0945    sennosides-docusate sodium (SENOKOT-S) 8.6-50 MG tablet 1 tablet  1 tablet Oral BID SCOTT Chapman CNP   1 tablet at 03/05/23 0945    diphenhydrAMINE (BENADRYL) capsule 25 mg  25 mg Oral Q6H PRN David Suarez MD   25 mg at 03/03/23 1245    lidocaine-prilocaine (EMLA) cream   Topical PRN SCOTT Chapman CNP        pantoprazole (PROTONIX) 40 mg in sodium chloride (PF) 0.9 % 10 mL injection  40 mg IntraVENous BID SCOTT Chapman CNP   40 mg at 03/05/23 0929    enoxaparin (LOVENOX) injection 40 mg  40 mg SubCUTAneous Daily Jolena Sinning, APRN - CNP   40 mg at 23 1746    polyethylene glycol (GLYCOLAX) packet 17 g  17 g Oral Daily PRN Jolena Sinning, APRN - CNP        acetaminophen (TYLENOL) tablet 650 mg  650 mg Oral Q6H PRN Jolena Sinning, APRN - CNP   650 mg at 23 1245    HYDROcodone-acetaminophen (NORCO) 5-325 MG per tablet 1 tablet  1 tablet Oral Q6H PRN Jolena Sinning, APRN - CNP        morphine injection 2 mg  2 mg IntraVENous Q4H PRN Jolena Sinning, APRN - CNP   2 mg at 23 0402    ondansetron (ZOFRAN) injection 4 mg  4 mg IntraVENous Q4H PRN Jolena Sinning, APRN - CNP   4 mg at 23    prochlorperazine (COMPAZINE) injection 10 mg  10 mg IntraVENous Q6H PRN Jolena Sinning, APRN - CNP   10 mg at 23 0408    diatrizoate meglumine-sodium (GASTROGRAFIN) 66-10 % solution 15 mL  15 mL Oral ONCE PRN Aileena Sinbret, APRN - CNP   15 mL at 23 1502    potassium chloride 20 mEq/50 mL IVPB (Central Line)  20 mEq IntraVENous PRN Manfred Daniel MD        Or    potassium chloride 10 mEq/100 mL IVPB (Peripheral Line)  10 mEq IntraVENous PRN Manfred Daniel MD        magnesium sulfate 2000 mg in 50 mL IVPB premix  2,000 mg IntraVENous PRN Manfred Daniel MD        sodium phosphate 10 mmol in sodium chloride 0.9 % 250 mL IVPB  10 mmol IntraVENous PRN Manfred Daniel MD        Or    sodium phosphate 15 mmol in sodium chloride 0.9 % 250 mL IVPB  15 mmol IntraVENous PRN Manfred Daniel MD        Or    sodium phosphate 20 mmol in sodium chloride 0.9 % 500 mL IVPB  20 mmol IntraVENous PRN Manfred Daniel MD           OBJECTIVE:  Patient Vitals for the past 8 hrs:   BP Temp Temp src Pulse Resp SpO2   23 0811 90/63 98.1 °F (36.7 °C) Oral 96 18 98 %   23 0402 -- -- -- -- 18 --   23 0358 -- -- -- -- 19 --   23 0247 101/64 98.4 °F (36.9 °C) Oral 89 18 99 %     Temp (24hrs), Av.2 °F (36.8 °C), Min:97.9 °F (36.6 °C), Max:98.4 °F (36.9 °C)    No intake/output data recorded.     Physical Exam:  Constitutional: Well developed female in no acute distress, lying comfortably in the hospital bed. HEENT: Normocephalic and atraumatic. Sclerae anicteric. Neck supple without JVD. No thyromegaly present. Skin Warm and dry. No bruising and no rash noted. No erythema. No pallor. Respiratory Lungs are clear to auscultation bilaterally without wheezes, rales or rhonchi, normal air exchange without accessory muscle use. CVS Normal rate, regular rhythm and normal S1 and S2. No murmurs, gallops, or rubs. Abdomen Mildly firm and distended distended, normoactive bowel sounds. Neuro Grossly nonfocal with no obvious sensory or motor deficits. MSK Normal range of motion in general.  +bLE edema and no tenderness. Psych Appropriate mood and affect.         Labs:    Recent Results (from the past 24 hour(s))   Triglyceride    Collection Time: 03/05/23  3:54 AM   Result Value Ref Range    Triglycerides 95 35 - 150 MG/DL   Comprehensive Metabolic Panel    Collection Time: 03/05/23  3:57 AM   Result Value Ref Range    Sodium 139 133 - 143 mmol/L    Potassium 4.3 3.5 - 5.1 mmol/L    Chloride 112 (H) 101 - 110 mmol/L    CO2 23 21 - 32 mmol/L    Anion Gap 4 2 - 11 mmol/L    Glucose 111 (H) 65 - 100 mg/dL    BUN 26 (H) 6 - 23 MG/DL    Creatinine 0.40 (L) 0.6 - 1.0 MG/DL    Est, Glom Filt Rate >60 >60 ml/min/1.73m2    Calcium 8.2 (L) 8.3 - 10.4 MG/DL    Total Bilirubin 0.4 0.2 - 1.1 MG/DL    ALT 12 12 - 65 U/L    AST 8 (L) 15 - 37 U/L    Alk Phosphatase 72 50 - 136 U/L    Total Protein 5.1 (L) 6.3 - 8.2 g/dL    Albumin 2.1 (L) 3.5 - 5.0 g/dL    Globulin 3.0 2.8 - 4.5 g/dL    Albumin/Globulin Ratio 0.7 0.4 - 1.6     Magnesium    Collection Time: 03/05/23  3:57 AM   Result Value Ref Range    Magnesium 1.8 1.8 - 2.4 mg/dL   CBC with Auto Differential    Collection Time: 03/05/23  3:57 AM   Result Value Ref Range    WBC 5.8 4.3 - 11.1 K/uL    RBC 2.87 (L) 4.05 - 5.2 M/uL    Hemoglobin 7.6 (L) 11.7 - 15.4 g/dL Hematocrit 25.2 (L) 35.8 - 46.3 %    MCV 87.8 82 - 102 FL    MCH 26.5 26.1 - 32.9 PG    MCHC 30.2 (L) 31.4 - 35.0 g/dL    RDW 19.4 (H) 11.9 - 14.6 %    Platelets 967 324 - 152 K/uL    MPV 10.2 9.4 - 12.3 FL    nRBC 0.04 0.0 - 0.2 K/uL    Differential Type AUTOMATED      Seg Neutrophils 62 43 - 78 %    Lymphocytes 21 13 - 44 %    Monocytes 15 (H) 4.0 - 12.0 %    Eosinophils % 1 0.5 - 7.8 %    Basophils 0 0.0 - 2.0 %    Immature Granulocytes 1 0.0 - 5.0 %    Segs Absolute 3.6 1.7 - 8.2 K/UL    Absolute Lymph # 1.2 0.5 - 4.6 K/UL    Absolute Mono # 0.9 0.1 - 1.3 K/UL    Absolute Eos # 0.1 0.0 - 0.8 K/UL    Basophils Absolute 0.0 0.0 - 0.2 K/UL    Absolute Immature Granulocyte 0.0 0.0 - 0.5 K/UL   Phosphorus    Collection Time: 03/05/23  3:57 AM   Result Value Ref Range    Phosphorus 4.7 (H) 2.5 - 4.5 MG/DL       Imaging:  CT Result (most recent):  CT ABDOMEN PELVIS W IV CONTRAST 03/02/2023    Narrative  EXAM: CT OF THE ABDOMEN AND PELVIS WITH IV CONTRAST    INDICATIONS: Nausea, vomiting, abdominal pain    TECHNIQUE: CT scan of the abdomen and pelvis was performed following the  administration of intravenous contrast. CT dose lowering techniques were used,  to include: automated exposure control, adjustment for patient size, and / or  use of iterative reconstruction. COMPARISON: 12/21/2022    FINDINGS:  Bones: No destructive osseous lesions. Lung bases: Unremarkable    ABDOMEN:  Liver: There is scalloping of the hepatic capsule by the adjacent fluid. The  portal venous system is patent. Gallbladder and Bile Ducts: There is wall thickening of the gallbladder. There  is no intrahepatic or extrahepatic biliary ductal dilatation. Spleen: A hypoattenuating structure in the posterior spleen is too small to  characterize. Pancreas: The pancreatic parenchyma is unremarkable. There is no pancreatic  ductal dilatation. Adrenals: Unremarkable without nodularity.     Kidneys: A right-sided double-J nephroureteral stent is present and appears  appropriately positioned. An extrarenal pelvis is present on the right. There  are some patchy regions of hypoenhancement in the right kidney. No focal lesion  is demonstrated. Vasculature: No evidence of atherosclerosis or aneurysm. Nodes: There is no lymphadenopathy by size criteria. Bowel: There is no bowel obstruction. Enteric contrast reaches the splenic  flexure. Status post sleeve gastrectomy. No extraluminal contrast is seen. The  appendix is unremarkable. There is wall thickening of the colon along its  length. Mesentery/Peritoneum: There is a small to moderate volume of abdominopelvic free  fluid. There is associated enhancement of the peritoneal surfaces. A right  lower quadrant approach drain terminates in the eyad hepatis. Soft Tissues: Unremarkable    PELVIS:  Pelvic Organs: Status post hysterectomy. There is wall thickening of the urinary  bladder. Impression  1. Small to moderate volume of abdominopelvic free fluid. There is associated  enhancement of the peritoneal surfaces. This may represent peritonitis and/or  carcinomatosis. 2. Wall thickening of the gallbladder. This is highly nonspecific and may be  reactive to the fluid. If there is clinical suspicion for cystic duct  obstruction, CT would be recommended. 3. Some patchy regions of hypoenhancement in the right kidney, compatible with  acute pyelonephritis. 4. Wall thickening of the urinary bladder. Please correlate with urinalysis. 5. Wall thickening of the colon along its length. This may be related to  relative underdistention or colitis. 6. Additional chronic findings as above. Zane Garsia M.D.  3/2/2023 8:41:00 PM      ASSESSMENT:  Principal Problem:    Intractable nausea and vomiting  Resolved Problems:    * No resolved hospital problems. *      Patient presents for evaluation prior to receiving cycle 6-day 1 of Taxol/Cyramza.   She visited ED on 2/20/2023 for intractable abdominal pain, nausea and vomiting. Vomitus is bilious and recurrent associated with retching and worsening of abdominal pain. She cannot tolerate oral medications and is dependent on TPN for her nutritional needs. She feels very weak and finds it difficult to speak. Denies fevers, chills, chest pain or cough. Denies diarrhea or abdominal distention. .    Since last visit patient's clinical status has significantly declined. She has intractable nausea, bilious vomiting and abdominal pain rated at 8 out of 10 today. I am concerned that with administration of chemotherapy her symptoms might worsen and therefor D1C6 ramucirumab + taxol will be held today. She will be given IV fluid and intravenous antiemetics in the chair today. We shall attempt to admit her directly to the hospital for expedited abdominal imaging and supportive management. Return office visit will be scheAduled according to her discharge. Patient/mother verbalized understanding of the above and are in agreement with the management plan. PLAN:  Metastatic carcinoma of unknown primary  - s/p C5D15 Cyramza/Taxol on 2/16. C6 due 3/2 - held d/t pt condition    Intractable abd pain / N/V / ? Peritonitis / Colitis / Acute Pyelonephritis  - Check CT AP  3/3 CT AP with sm-mod volume of abdominopelvic free fluid ?peritonitis and/or carcinomatosis; wall thickening of GB; acute pyelonephritis; wall thickening of urinary bladder; and colitis. Check Bcx/Ucx. Start CTX/Flagyl.    3/4 Afeb, nausea and pain are improving, BC/UC -NGTD, continue CTX/Flagyl, OK to drain PleurX prn  3/5 Afeb, minimal nausea/pain now, BC-NGTD, UC with 50-100K colonies normal skin alberta, remains on CTX/Flagyl, PleurX drained yesterday ~250ml    Nutrition  - Consult RD to con't TPN    Anemia secondary to chemotherapy  - Tranfuse prn per Andres SOPs  3/3 Transfuse 1 unit PRBCs  3/4 Hgb 7.9  3/5 Hgb 7.6    Constipation  3/3 Start pericolace BID    Continue home meds  Andres SOPs  Lovenox for DVT ppx    Goals and plan of care reviewed with the patient. All questions answered to the best of our ability. Disposition:  Anticipate discharge home once symptoms controlled.               SCOTT Qureshi  Hematology & Oncology  [id number] [address]  Office : [phone]  Fax : [phone]

## 2023-03-06 PROBLEM — D84.9 IMMUNOCOMPROMISED (HCC): Status: ACTIVE | Noted: 2023-03-06

## 2023-03-06 PROBLEM — R93.5 ABNORMAL CT OF THE ABDOMEN: Status: ACTIVE | Noted: 2023-03-06

## 2023-03-06 PROBLEM — R60.0 LOCALIZED EDEMA: Status: ACTIVE | Noted: 2023-03-06

## 2023-03-06 LAB
25(OH)D3 SERPL-MCNC: 30.1 NG/ML (ref 30–100)
ALBUMIN SERPL-MCNC: 2 G/DL (ref 3.5–5)
ALBUMIN/GLOB SERPL: 0.7 (ref 0.4–1.6)
ALP SERPL-CCNC: 75 U/L (ref 50–136)
ALT SERPL-CCNC: 16 U/L (ref 12–65)
ANION GAP SERPL CALC-SCNC: 3 MMOL/L (ref 2–11)
AST SERPL-CCNC: 13 U/L (ref 15–37)
BASOPHILS # BLD: 0 K/UL (ref 0–0.2)
BASOPHILS NFR BLD: 0 % (ref 0–2)
BILIRUB SERPL-MCNC: 0.2 MG/DL (ref 0.2–1.1)
BUN SERPL-MCNC: 22 MG/DL (ref 6–23)
CALCIUM SERPL-MCNC: 7.9 MG/DL (ref 8.3–10.4)
CHLORIDE SERPL-SCNC: 113 MMOL/L (ref 101–110)
CO2 SERPL-SCNC: 23 MMOL/L (ref 21–32)
CREAT SERPL-MCNC: 0.4 MG/DL (ref 0.6–1)
DIFFERENTIAL METHOD BLD: ABNORMAL
EOSINOPHIL # BLD: 0.1 K/UL (ref 0–0.8)
EOSINOPHIL NFR BLD: 1 % (ref 0.5–7.8)
ERYTHROCYTE [DISTWIDTH] IN BLOOD BY AUTOMATED COUNT: 19.8 % (ref 11.9–14.6)
FERRITIN SERPL-MCNC: 58 NG/ML (ref 8–388)
FOLATE SERPL-MCNC: 4.7 NG/ML (ref 3.1–17.5)
GLOBULIN SER CALC-MCNC: 3 G/DL (ref 2.8–4.5)
GLUCOSE SERPL-MCNC: 115 MG/DL (ref 65–100)
HCT VFR BLD AUTO: 24.4 % (ref 35.8–46.3)
HGB BLD-MCNC: 7.3 G/DL (ref 11.7–15.4)
HGB RETIC QN AUTO: 19 PG (ref 29–35)
IMM GRANULOCYTES # BLD AUTO: 0 K/UL (ref 0–0.5)
IMM GRANULOCYTES NFR BLD AUTO: 1 % (ref 0–5)
IMM RETICS NFR: 34.5 % (ref 3–15.9)
IRON SATN MFR SERPL: 9 %
IRON SERPL-MCNC: 24 UG/DL (ref 35–150)
LDH SERPL L TO P-CCNC: 169 U/L (ref 100–190)
LYMPHOCYTES # BLD: 1.2 K/UL (ref 0.5–4.6)
LYMPHOCYTES NFR BLD: 22 % (ref 13–44)
MAGNESIUM SERPL-MCNC: 1.6 MG/DL (ref 1.8–2.4)
MCH RBC QN AUTO: 26.5 PG (ref 26.1–32.9)
MCHC RBC AUTO-ENTMCNC: 29.9 G/DL (ref 31.4–35)
MCV RBC AUTO: 88.7 FL (ref 82–102)
MONOCYTES # BLD: 1 K/UL (ref 0.1–1.3)
MONOCYTES NFR BLD: 18 % (ref 4–12)
NEUTS SEG # BLD: 3.2 K/UL (ref 1.7–8.2)
NEUTS SEG NFR BLD: 58 % (ref 43–78)
NRBC # BLD: 0.02 K/UL (ref 0–0.2)
PHOSPHATE SERPL-MCNC: 3.6 MG/DL (ref 2.5–4.5)
PLATELET # BLD AUTO: 255 K/UL (ref 150–450)
PMV BLD AUTO: 9.9 FL (ref 9.4–12.3)
POTASSIUM SERPL-SCNC: 4 MMOL/L (ref 3.5–5.1)
PROT SERPL-MCNC: 5 G/DL (ref 6.3–8.2)
RBC # BLD AUTO: 2.75 M/UL (ref 4.05–5.2)
RETICS # AUTO: 0.04 M/UL (ref 0.03–0.1)
RETICS/RBC NFR AUTO: 1.2 % (ref 0.3–2)
SODIUM SERPL-SCNC: 139 MMOL/L (ref 133–143)
TIBC SERPL-MCNC: 263 UG/DL (ref 250–450)
VIT B12 SERPL-MCNC: 271 PG/ML (ref 193–986)
WBC # BLD AUTO: 5.5 K/UL (ref 4.3–11.1)

## 2023-03-06 PROCEDURE — 80053 COMPREHEN METABOLIC PANEL: CPT

## 2023-03-06 PROCEDURE — 2580000003 HC RX 258: Performed by: INTERNAL MEDICINE

## 2023-03-06 PROCEDURE — 82306 VITAMIN D 25 HYDROXY: CPT

## 2023-03-06 PROCEDURE — 6360000002 HC RX W HCPCS: Performed by: INTERNAL MEDICINE

## 2023-03-06 PROCEDURE — 83735 ASSAY OF MAGNESIUM: CPT

## 2023-03-06 PROCEDURE — C9113 INJ PANTOPRAZOLE SODIUM, VIA: HCPCS | Performed by: NURSE PRACTITIONER

## 2023-03-06 PROCEDURE — 84100 ASSAY OF PHOSPHORUS: CPT

## 2023-03-06 PROCEDURE — 85046 RETICYTE/HGB CONCENTRATE: CPT

## 2023-03-06 PROCEDURE — 6360000002 HC RX W HCPCS: Performed by: NURSE PRACTITIONER

## 2023-03-06 PROCEDURE — 83615 LACTATE (LD) (LDH) ENZYME: CPT

## 2023-03-06 PROCEDURE — A4216 STERILE WATER/SALINE, 10 ML: HCPCS | Performed by: NURSE PRACTITIONER

## 2023-03-06 PROCEDURE — 82607 VITAMIN B-12: CPT

## 2023-03-06 PROCEDURE — 83540 ASSAY OF IRON: CPT

## 2023-03-06 PROCEDURE — 2500000003 HC RX 250 WO HCPCS: Performed by: INTERNAL MEDICINE

## 2023-03-06 PROCEDURE — 82746 ASSAY OF FOLIC ACID SERUM: CPT

## 2023-03-06 PROCEDURE — 82728 ASSAY OF FERRITIN: CPT

## 2023-03-06 PROCEDURE — 85025 COMPLETE CBC W/AUTO DIFF WBC: CPT

## 2023-03-06 PROCEDURE — 99232 SBSQ HOSP IP/OBS MODERATE 35: CPT | Performed by: INTERNAL MEDICINE

## 2023-03-06 PROCEDURE — 2500000003 HC RX 250 WO HCPCS: Performed by: NURSE PRACTITIONER

## 2023-03-06 PROCEDURE — 1100000000 HC RM PRIVATE

## 2023-03-06 PROCEDURE — APPSS45 APP SPLIT SHARED TIME 31-45 MINUTES: Performed by: NURSE PRACTITIONER

## 2023-03-06 PROCEDURE — 83010 ASSAY OF HAPTOGLOBIN QUANT: CPT

## 2023-03-06 PROCEDURE — 2580000003 HC RX 258: Performed by: NURSE PRACTITIONER

## 2023-03-06 RX ORDER — FUROSEMIDE 10 MG/ML
20 INJECTION INTRAMUSCULAR; INTRAVENOUS ONCE
Status: COMPLETED | OUTPATIENT
Start: 2023-03-06 | End: 2023-03-06

## 2023-03-06 RX ADMIN — ENOXAPARIN SODIUM 40 MG: 100 INJECTION SUBCUTANEOUS at 18:40

## 2023-03-06 RX ADMIN — FUROSEMIDE 20 MG: 20 INJECTION, SOLUTION INTRAMUSCULAR; INTRAVENOUS at 11:23

## 2023-03-06 RX ADMIN — CEFTRIAXONE SODIUM 2000 MG: 2 INJECTION, POWDER, FOR SOLUTION INTRAMUSCULAR; INTRAVENOUS at 09:56

## 2023-03-06 RX ADMIN — SOYBEAN OIL 250 ML: 20 INJECTION, SOLUTION INTRAVENOUS at 17:58

## 2023-03-06 RX ADMIN — METRONIDAZOLE 500 MG: 500 INJECTION, SOLUTION INTRAVENOUS at 09:57

## 2023-03-06 RX ADMIN — METRONIDAZOLE 500 MG: 500 INJECTION, SOLUTION INTRAVENOUS at 00:43

## 2023-03-06 RX ADMIN — ONDANSETRON 4 MG: 2 INJECTION INTRAMUSCULAR; INTRAVENOUS at 20:36

## 2023-03-06 RX ADMIN — MAGNESIUM SULFATE HEPTAHYDRATE 2000 MG: 40 INJECTION, SOLUTION INTRAVENOUS at 11:22

## 2023-03-06 RX ADMIN — POTASSIUM CHLORIDE: 2 INJECTION, SOLUTION, CONCENTRATE INTRAVENOUS at 17:58

## 2023-03-06 RX ADMIN — SODIUM CHLORIDE, PRESERVATIVE FREE 40 MG: 5 INJECTION INTRAVENOUS at 20:37

## 2023-03-06 RX ADMIN — SODIUM CHLORIDE, PRESERVATIVE FREE 40 MG: 5 INJECTION INTRAVENOUS at 09:56

## 2023-03-06 RX ADMIN — MORPHINE SULFATE 2 MG: 2 INJECTION, SOLUTION INTRAMUSCULAR; INTRAVENOUS at 20:41

## 2023-03-06 RX ADMIN — METRONIDAZOLE 500 MG: 500 INJECTION, SOLUTION INTRAVENOUS at 17:42

## 2023-03-06 ASSESSMENT — PAIN DESCRIPTION - ORIENTATION: ORIENTATION: MID;LOWER

## 2023-03-06 ASSESSMENT — PAIN DESCRIPTION - LOCATION: LOCATION: ABDOMEN

## 2023-03-06 ASSESSMENT — PAIN SCALES - GENERAL
PAINLEVEL_OUTOF10: 0
PAINLEVEL_OUTOF10: 8
PAINLEVEL_OUTOF10: 0

## 2023-03-06 ASSESSMENT — PAIN DESCRIPTION - DESCRIPTORS: DESCRIPTORS: OTHER (COMMENT)

## 2023-03-06 NOTE — PROGRESS NOTES
[employer] [facility]        Inpatient Hematology / Oncology Progress Note    Reason for Admission:  Intractable nausea and vomiting [R11.2]    24 Hour Events:  Afebrile, VSS  Pain and nausea are improving, asked to advance diet today  Hgb 7.3  On CTX/Flagyl (D4)  Pt states she feels swollen, I/Os +4L    Transfusions: None  Replacements: Mg+ (on TPN)        ROS:  Constitutional: Negative for fever, chills. CV: Negative for chest pain, palpitations, edema. Respiratory: Negative for dyspnea, cough, wheezing. GI: +abd pain, nausea (improved)    10 point review of systems is otherwise negative with the exception of the elements mentioned above in the HPI.        No Known Allergies  Past Medical History:   Diagnosis Date    Alteration in nutrition     per RD note TPN dependent 12 hours per day- [facility]    Anemia     Colon cancer ([facility] [address] dx 2022    followed by dr Mauro Blas    COVID-19 2020    no hospitalization    GERD (gastroesophageal reflux disease)     managed with med    History of blood transfusion     History of colonoscopy 2018    Dr. Tyrell Ramirez, nl (see media note), R     Hx of blood clots 2022    per pt \"small clot on lung identified by CT scan\"  CT Scan impression:- Nonobstructive pulmonary filling defect involving Left Lower Lobe     Hypotension     asymptomatic    Obstruction of fallopian tube     per pt has \"1 good tube\"    Peritoneal carcinomatosis ([facility] [address]     chemo; abdominal pleurx    Weight loss     80lbs weight loss after gastric sleeve     Past Surgical History:   Procedure Laterality Date     SECTION      CYSTOSCOPY Bilateral 2022    CYSTOSCOPY BILATERAL RETROGRADE PYELOGRAM performed by Brea Palacios MD at [address] Bilateral 2022    BILATERAL CYSTOSCOPY URETERAL STENT EXCHANGE performed by Brea Palacios MD at [address] Bilateral 10/6/2022    CYSTOSCOPY BILATERAL URETERAL STENT REMOVAL, RIGHT URETERAL [address] PLACEMENT performed by Sally Batres MD at [facility] MAIN OR    CYSTOSCOPY Bilateral 1/26/2023    CYSTOSCOPY / BILATERAL RETROGRADE / RIGHT URETERAL STENT EXCHANGE performed by Sally Batres MD at [facility] MAIN OR    GASTRIC BYPASS SURGERY  07/23/2014    gastric sleeve- [place]    HYSTERECTOMY (CERVIX STATUS UNKNOWN)      2018    HYSTERECTOMY (CERVIX STATUS UNKNOWN)  07/09/2020    TL[facility] w/ Bilateral salpingectomy and left oophorectomy    IR PERC CATH PLEURAL DRAIN W/IMAG  9/26/2022    IR PERC CATH PLEURAL DRAIN W/IMAG 9/26/2022 [facility]    IR PORT PLACEMENT EQUAL OR GREATER THAN 5 YEARS  2/28/2022    IR PORT PLACEMENT EQUAL OR GREATER THAN 5 YEARS  2/28/2022    IR PORT PLACEMENT EQUAL OR GREATER THAN 5 YEARS 2/28/2022 [facility]OLOG[facility] SPECI[facility]    LAPAROTOMY N/A 8/17/2022    EXPLORATORY LAPAROTOMY/ ENTEROENTEROSTOMY performed by Nicole Goyal MD at [facility]. [address]  age Leona June 21s\"    also \"unblocked her FT\"    TONSILLECTOMY      UPPER GASTROINTESTINAL ENDOSCOPY      with dilation    UPPER GASTROINTESTINAL ENDOSCOPY N/A 9/13/2022    EGD ESOPHAGOGASTRODUODENOSCOPY OFL 17 To IR after recovery for Para performed by Therese Alexis MD at [facility] ENDOSCOPY    UPPER GASTROINTESTINAL ENDOSCOPY N/A 10/21/2022    EGD DILATION BALLOON performed by Patricia Alcazar MD at [facility] ENDOSCOPY    UROLOGICAL SURGERY Bilateral 03/15/2022    cysto     Family History   Problem Relation Age of Onset    Heart Disease Maternal Grandmother     Hypertension Maternal Grandmother     Heart Disease Maternal Grandfather     Hypertension Maternal Grandfather     Pulmonary Embolism Neg Hx     Colon Cancer Neg Hx     Deep Vein Thrombosis Neg Hx     Ovarian Cancer Neg Hx     Prostate Cancer Neg Hx     Breast Cancer Neg Hx      Social History     Socioeconomic History    Marital status: Single     Spouse name: Not on file    Number of children: Not on file    Years of education: Not on file    Highest education level: Not on file   Occupational History    Not on file   Tobacco Use    Smoking status: Never    Smokeless tobacco: Never   Vaping Use    Vaping Use: Never used   Substance and Sexual Activity    Alcohol use: Not Currently    Drug use: No    Sexual activity: Not on file   Other Topics Concern    Not on file   Social History Narrative    1.  Use large speculum.   2.  sister, Joyce Stovall #[id number] and mom is Ophelia Panda #[id number] (both Kofoed's pts)  3.  PCP  Dr. Trell Fay ([facility]), GI Dr. Anibal Bello-2018 normal EGD     Social Determinants of Health     Financial Resource Strain: Not on file   Food Insecurity: Not on file   Transportation Needs: Not on file   Physical Activity: Not on file   Stress: Not on file   Social Connections: Not on file   Intimate Partner Violence: Not on file   Housing Stability: Not on file     Current Facility-Administered Medications   Medication Dose Route Frequency Provider Last Rate Last Admin    PN-Adult Premix 4.25/10 - [facility]   IntraVENous Continuous TPN Yash Gamez MD        PN-Adult Premix 4.25/10 - [facility]   IntraVENous Continuous TPN Yash Gamez MD   Stopped at 03/06/23 0632    fat emulsion 20 % infusion 250 mL  250 mL IntraVENous Daily Yash Gamez MD   Stopped at 03/06/23 0551    cefTRIAXone (ROCEPHIN) 2,000 mg in sodium chloride 0.9 % 50 mL IVPB (mini-bag)  2,000 mg IntraVENous Q24H Abigail Nichols, APRN - CNP   Stopped at 03/06/23 1050    metronidazole (FLAGYL) 500 mg in 0.9% NaCl 100 mL IVPB premix  500 mg IntraVENous Q8H Abigail Simone, APRN - CNP   Stopped at 03/06/23 1100    sennosides-docusate sodium (SENOKOT-S) 8.6-50 MG tablet 1 tablet  1 tablet Oral BID Abigail Nichols, APRN - CNP   1 tablet at 03/05/23 0945    diphenhydrAMINE (BENADRYL) capsule 25 mg  25 mg Oral Q6H PRN Yash Gamez MD   25 mg at 03/03/23 1245    lidocaine-prilocaine (EMLA) cream   Topical PRN Abigail Nichols, APRN - CNP        pantoprazole (PROTONIX) 40 mg in sodium chloride (PF) 0.9 % 10 mL  injection  40 mg IntraVENous BID Arsen Never, APRN - CNP   40 mg at 23 0956    enoxaparin (LOVENOX) injection 40 mg  40 mg SubCUTAneous Daily Arsen Never, APRN - CNP   40 mg at 23 1623    polyethylene glycol (GLYCOLAX) packet 17 g  17 g Oral Daily PRN Arsen Never, APRN - CNP        acetaminophen (TYLENOL) tablet 650 mg  650 mg Oral Q6H PRN Arsen Never, APRN - CNP   650 mg at 23 1245    HYDROcodone-acetaminophen (NORCO) 5-325 MG per tablet 1 tablet  1 tablet Oral Q6H PRN Arsen Never, APRN - CNP        morphine injection 2 mg  2 mg IntraVENous Q4H PRN Arsen Never, APRN - CNP   2 mg at 23 174    ondansetron (ZOFRAN) injection 4 mg  4 mg IntraVENous Q4H PRN Arsen Never, APRN - CNP   4 mg at 23    prochlorperazine (COMPAZINE) injection 10 mg  10 mg IntraVENous Q6H PRN Arsen Never, APRN - CNP   10 mg at 23 1736    diatrizoate meglumine-sodium (GASTROGRAFIN) 66-10 % solution 15 mL  15 mL Oral ONCE PRN Arsen Never, APRN - CNP   15 mL at 23 1502    potassium chloride 20 mEq/50 mL IVPB (Central Line)  20 mEq IntraVENous PRN Joaquin Mckay MD        Or    potassium chloride 10 mEq/100 mL IVPB (Peripheral Line)  10 mEq IntraVENous PRN Joaquin Mckay MD        magnesium sulfate 2000 mg in 50 mL IVPB premix  2,000 mg IntraVENous PRN Joaquin Mckay MD 25 mL/hr at 23 1122 2,000 mg at 23 1122    sodium phosphate 10 mmol in sodium chloride 0.9 % 250 mL IVPB  10 mmol IntraVENous PRN Joaquin Mckay MD        Or    sodium phosphate 15 mmol in sodium chloride 0.9 % 250 mL IVPB  15 mmol IntraVENous PRN Joaquin Mckay MD        Or    sodium phosphate 20 mmol in sodium chloride 0.9 % 500 mL IVPB  20 mmol IntraVENous PRN Joaquin Mckay MD           OBJECTIVE:  Patient Vitals for the past 8 hrs:   BP Temp Pulse SpO2   23 1148 (!) 124/97 98.6 °F (37 °C) 86 100 %   23 0722 103/69 98.3 °F (36.8 °C) (!) 110 99 %     Temp (24hrs), Av.8 °F (37.1 °C), Min:98.3 °F (36.8 °C), Max:99.7 °F (37.6 °C)    No intake/output data recorded. Physical Exam:  Constitutional: Well developed female in no acute distress, lying comfortably in the hospital bed. HEENT: Normocephalic and atraumatic. Sclerae anicteric. Neck supple without JVD. No thyromegaly present. Skin Warm and dry. No bruising and no rash noted. No erythema. No pallor. Respiratory Lungs are clear to auscultation bilaterally without wheezes, rales or rhonchi, normal air exchange without accessory muscle use. CVS Normal rate, regular rhythm and normal S1 and S2. No murmurs, gallops, or rubs. Abdomen Mildly firm and distended, normoactive bowel sounds. +Pleurx. Neuro Grossly nonfocal with no obvious sensory or motor deficits. MSK Normal range of motion in general.  +BLE edema and no tenderness. Psych Appropriate mood and affect.         Labs:    Recent Results (from the past 24 hour(s))   Comprehensive Metabolic Panel    Collection Time: 03/06/23  3:28 AM   Result Value Ref Range    Sodium 139 133 - 143 mmol/L    Potassium 4.0 3.5 - 5.1 mmol/L    Chloride 113 (H) 101 - 110 mmol/L    CO2 23 21 - 32 mmol/L    Anion Gap 3 2 - 11 mmol/L    Glucose 115 (H) 65 - 100 mg/dL    BUN 22 6 - 23 MG/DL    Creatinine 0.40 (L) 0.6 - 1.0 MG/DL    Est, Glom Filt Rate >60 >60 ml/min/1.73m2    Calcium 7.9 (L) 8.3 - 10.4 MG/DL    Total Bilirubin 0.2 0.2 - 1.1 MG/DL    ALT 16 12 - 65 U/L    AST 13 (L) 15 - 37 U/L    Alk Phosphatase 75 50 - 136 U/L    Total Protein 5.0 (L) 6.3 - 8.2 g/dL    Albumin 2.0 (L) 3.5 - 5.0 g/dL    Globulin 3.0 2.8 - 4.5 g/dL    Albumin/Globulin Ratio 0.7 0.4 - 1.6     Magnesium    Collection Time: 03/06/23  3:28 AM   Result Value Ref Range    Magnesium 1.6 (L) 1.8 - 2.4 mg/dL   CBC with Auto Differential    Collection Time: 03/06/23  3:28 AM   Result Value Ref Range    WBC 5.5 4.3 - 11.1 K/uL    RBC 2.75 (L) 4.05 - 5.2 M/uL    Hemoglobin 7.3 (L) 11.7 - 15.4 g/dL    Hematocrit 24.4 (L) 35.8 - 46.3 % MCV 88.7 82 - 102 FL    MCH 26.5 26.1 - 32.9 PG    MCHC 29.9 (L) 31.4 - 35.0 g/dL    RDW 19.8 (H) 11.9 - 14.6 %    Platelets 081 811 - 566 K/uL    MPV 9.9 9.4 - 12.3 FL    nRBC 0.02 0.0 - 0.2 K/uL    Differential Type AUTOMATED      Seg Neutrophils 58 43 - 78 %    Lymphocytes 22 13 - 44 %    Monocytes 18 (H) 4.0 - 12.0 %    Eosinophils % 1 0.5 - 7.8 %    Basophils 0 0.0 - 2.0 %    Immature Granulocytes 1 0.0 - 5.0 %    Segs Absolute 3.2 1.7 - 8.2 K/UL    Absolute Lymph # 1.2 0.5 - 4.6 K/UL    Absolute Mono # 1.0 0.1 - 1.3 K/UL    Absolute Eos # 0.1 0.0 - 0.8 K/UL    Basophils Absolute 0.0 0.0 - 0.2 K/UL    Absolute Immature Granulocyte 0.0 0.0 - 0.5 K/UL   Phosphorus    Collection Time: 03/06/23  3:28 AM   Result Value Ref Range    Phosphorus 3.6 2.5 - 4.5 MG/DL       Imaging:  CT Result (most recent):  CT ABDOMEN PELVIS W IV CONTRAST 03/02/2023    Narrative  EXAM: CT OF THE ABDOMEN AND PELVIS WITH IV CONTRAST    INDICATIONS: Nausea, vomiting, abdominal pain    TECHNIQUE: CT scan of the abdomen and pelvis was performed following the  administration of intravenous contrast. CT dose lowering techniques were used,  to include: automated exposure control, adjustment for patient size, and / or  use of iterative reconstruction. COMPARISON: 12/21/2022    FINDINGS:  Bones: No destructive osseous lesions. Lung bases: Unremarkable    ABDOMEN:  Liver: There is scalloping of the hepatic capsule by the adjacent fluid. The  portal venous system is patent. Gallbladder and Bile Ducts: There is wall thickening of the gallbladder. There  is no intrahepatic or extrahepatic biliary ductal dilatation. Spleen: A hypoattenuating structure in the posterior spleen is too small to  characterize. Pancreas: The pancreatic parenchyma is unremarkable. There is no pancreatic  ductal dilatation. Adrenals: Unremarkable without nodularity.     Kidneys: A right-sided double-J nephroureteral stent is present and appears  appropriately positioned. An extrarenal pelvis is present on the right. There  are some patchy regions of hypoenhancement in the right kidney. No focal lesion  is demonstrated. Vasculature: No evidence of atherosclerosis or aneurysm. Nodes: There is no lymphadenopathy by size criteria. Bowel: There is no bowel obstruction. Enteric contrast reaches the splenic  flexure. Status post sleeve gastrectomy. No extraluminal contrast is seen. The  appendix is unremarkable. There is wall thickening of the colon along its  length. Mesentery/Peritoneum: There is a small to moderate volume of abdominopelvic free  fluid. There is associated enhancement of the peritoneal surfaces. A right  lower quadrant approach drain terminates in the eyad hepatis. Soft Tissues: Unremarkable    PELVIS:  Pelvic Organs: Status post hysterectomy. There is wall thickening of the urinary  bladder. Impression  1. Small to moderate volume of abdominopelvic free fluid. There is associated  enhancement of the peritoneal surfaces. This may represent peritonitis and/or  carcinomatosis. 2. Wall thickening of the gallbladder. This is highly nonspecific and may be  reactive to the fluid. If there is clinical suspicion for cystic duct  obstruction, CT would be recommended. 3. Some patchy regions of hypoenhancement in the right kidney, compatible with  acute pyelonephritis. 4. Wall thickening of the urinary bladder. Please correlate with urinalysis. 5. Wall thickening of the colon along its length. This may be related to  relative underdistention or colitis. 6. Additional chronic findings as above. Latoya Mandel M.D.  3/2/2023 8:41:00 PM      ASSESSMENT:  Principal Problem:    Intractable nausea and vomiting  Resolved Problems:    * No resolved hospital problems. *      Patient presents for evaluation prior to receiving cycle 6-day 1 of Taxol/Cyramza. She visited ED on 2/20/2023 for intractable abdominal pain, nausea and vomiting.   Vomitus is bilious and recurrent associated with retching and worsening of abdominal pain. She cannot tolerate oral medications and is dependent on TPN for her nutritional needs. She feels very weak and finds it difficult to speak. Denies fevers, chills, chest pain or cough. Denies diarrhea or abdominal distention. .    Since last visit patient's clinical status has significantly declined. She has intractable nausea, bilious vomiting and abdominal pain rated at 8 out of 10 today. I am concerned that with administration of chemotherapy her symptoms might worsen and therefor D1C6 ramucirumab + taxol will be held today. She will be given IV fluid and intravenous antiemetics in the chair today. We shall attempt to admit her directly to the hospital for expedited abdominal imaging and supportive management. Return office visit will be scheAduled according to her discharge. Patient/mother verbalized understanding of the above and are in agreement with the management plan. PLAN:  Metastatic carcinoma of unknown primary  - s/p C5D15 Cyramza/Taxol on 2/16. C6 due 3/2 - held d/t pt condition    Intractable abd pain / N/V / ? Peritonitis / Colitis / Acute Pyelonephritis  - Check CT AP  3/3 CT AP with sm-mod volume of abdominopelvic free fluid ?peritonitis and/or carcinomatosis; wall thickening of GB; acute pyelonephritis; wall thickening of urinary bladder; and colitis. Check Bcx/Ucx. Start CTX/Flagyl. 3/4 Afeb, nausea and pain are improving, BC/UC -NGTD, continue CTX/Flagyl, OK to drain PleurX prn  3/5 Afeb, minimal nausea/pain now, BC-NGTD, UC with 50-100K colonies normal skin alberta, remains on CTX/Flagyl, PleurX drained yesterday ~250ml  3/6 Nausea and abd pain improved. Pt requests to advance diet, order placed. Remains afebrile. Bcx-NGTD. Con't CTX/Flagyl.     Nutrition  - Consult RD to con't TPN    Anemia secondary to chemotherapy  - Tranfuse prn per Andres SOPs  3/3 Transfuse 1 unit PRBCs  3/4 Hgb 7.9  3/5 Hgb 7.6  3/6 Hgb 7.3. Check nutritional studies and hemolysis labs. Constipation  3/3 Start pericolace BID  3/6 Reports BM x 2 yesterday  RESOLVED    Edema  3/6 Pt states she feels swollen. BLE edema noted. Lasix 20mg IV x 1 ordered. Continue home meds  Andres SOPs  Lovenox for DVT ppx    Goals and plan of care reviewed with the patient. All questions answered to the best of our ability. Disposition:  Anticipate discharge home tomorrow, 3/7. She will continue TPN at discharge.               SCOTT Arriagaigen  Hematology & Oncology  [address] [address]  Office : [phone]  Fax : [phone]

## 2023-03-06 NOTE — PROGRESS NOTES
Comprehensive Nutrition Assessment    Type and Reason for Visit: Reassess  TPN Management (Oncology)    Nutrition Recommendations/Plan:   Parenteral Nutrition:  Central parenteral nutrition  begins at 1800 today  Change: Dex 10% , 4.25% AA 2.28 L   Initiate 250 ml 20% lipids daily   To provide: 1663 kcal/d (100% of needs), 97 grams of protein/d (100% of needs), 228 (5.8 mg/kg/min) grams of CHO/d and ~2530 ml of total volume/d. Continue 12 hr cyclic infusion: 154 ml/hr from 9168-9760, 205 ml/hr from 1672-3068, 115 ml/hr 9196-1040, then off   Lytes/L:   Sodium ( 130 meq NaCl), Potassium ( 15 meq KCl) Phosphorus ( 5 mmol KPO4), Calcium (4.5 meq), Magnesium 15 meq   Other additives:   MVI Monday Wednesday Friday due to [employer], MTE  Nutrition Related Medication Management:  Electrolyte Replacement Protocol PRN Potassium, Phosphorus, and Magnesium   Mg replacement today, discussed with Emelia Birmingham RN  Labs:   CMP daily per MD  Mg daily per MD  Phos daily    Triglyceride weekly on Tuesday  POC Glucoses/SSI Not indicated  Meals and Snacks:  Diet: Continue current order. Encouraged pt to order foods she knows she can tolerate and limit # or items and/or volume of intake as she does at home. Nutrition Supplement Therapy:  Medical food supplement therapy:  Discontinue Ensure Clear three times per day (this provides 240 kcal and 8 grams protein per bottle)     Malnutrition Assessment:3/2  Malnutrition Status: Insufficient data (previous severe malnutrition, NFPE deferred thi date, has been gaining weight on home PN)    No amie wasting  Nutrition Assessment:  Nutrition History: 3/2: Patient known to RD. Eating normally 3 meals per day prior to 9/2022 when she presented with N/V and abdominal distention. She was discharged on a regular diet with Ensure Clear. She presented back 10/2022 with N/V and was eventually started on TPN and discharged on TPN. She remains TPN for primary needs.   Weight has been trending up per outpatient office visits. Followed by outpatient RD and RD with [facility]. 3/3: Spoke with [facility] Shira LINDSEY Patient is on 12 hr cyclic infusion. PN volume 2.3L/d (85 g pro, 50 g fat, 220 g CHO). She states that she has been stable on 130 meq/l Na.   3/6: Pt eats what she wants at home including solid food. Intake is <25%bites and for pleasure     Do You Have Any Cultural, Confucianism, or Ethnic Food Preferences?: No   Nutrition Background:       Patient with PMH significant for gastric sleeve, hydronephrosis with stents and recent right stent exchange (left unable to be exchanged due to tumor), metastatic adeno with suspected GI origin with peritoneal carcinomatosis on diagnosis, debulking unsuccessful, recent GIB, DVT, Pleurx placement 9/26/22. She presented with abdominal pain, difficulty swallowing \"stuck in throat and vomits. \" She was supposed to have outpt EGD but presented to ER with intractable nausea and vomiting. s/p EGD 10/21 with findings of LA grade D esophagitis, proximal esophageal stricture, Sckatzki's ring. She presented for chemo. She was a direct admit from cancer clinic for intractable N/V and worsening abdominal pain. CT scan showing diffuse changes potentially consistent with inflammatory processes in her colon, kidney, and biliary system. It is perhaps more likely that her disseminated carcinomatosis is now infiltrating all of these organs  Nutrition Interval:  TPN without lipids initiated 3/2 via port. CTAP with contrast 3/2 showed enhancement of peritoneal surfaces that may represent peritonitis and/or carcinomatosis. TPN transitioned to 12 hr cyclic infusion per home regimen 3/3. Daily lipids started 3/4    3/6: Patient seen sitting up in bed. Pt  denies N/V, reports no further constipation with BM yesterday and today. She says Ensure clear is alright, but not something she looks forward to.  Pt reports she was tod by provider this morning that her diet will be progressed to solid food. Pt is looking forward to food at lunch. Reviewed that PN meets estimated needs and that is for pleasure so no need to ;push po or ONS intake. Discussed pt with TRI Shukla. Abdominal Status (last documented by RN):   Last BM (including prior to admit):  (pt states last week)  Abdomen Inspection: Distended, RUQ Bowel Sounds: Active, LUQ Bowel Sounds: Active, RLQ Bowel Sounds: Active, LLQ Bowel Sounds: Active   Pertinent Medications: Protonix, rocephin, flagyl, senokot  Continuous: none  IVF: none  Electrolyte Replacement:  none thus far  PRN: Zofran (last admin 3/2), Glycolax, compazine (last admin 3/6)  Pertinent Labs:   Lab Results   Component Value Date/Time     03/06/2023 03:28 AM    K 4.0 03/06/2023 03:28 AM     03/06/2023 03:28 AM    CO2 23 03/06/2023 03:28 AM    BUN 22 03/06/2023 03:28 AM    CREATININE 0.40 03/06/2023 03:28 AM    GLUCOSE 115 03/06/2023 03:28 AM    CALCIUM 7.9 03/06/2023 03:28 AM    PHOS 3.6 03/06/2023 03:28 AM    MG 1.6 03/06/2023 03:28 AM     Lab Results   Component Value Date/Time    TRIG 95 03/05/2023 03:54 AM    TRIG 69 03/04/2023 03:32 AM    TRIG 100 03/03/2023 03:14 AM     Remarkable for:   K stable  CL up but Na and CO2 WDL  Magnesium low. Mg increased in PN to 23 meq/d last night. Will increase further today. Phos-improved to WDL with Phos decreased in TPN to 11.5 mmole/d. Current Nutrition Therapies:  ADULT ORAL NUTRITION SUPPLEMENT; Breakfast, Lunch, Dinner; Clear Liquid Oral Supplement  PN-Adult Premix 4.25/10 - [facility]  ADULT DIET;  Regular; GI [place] (GERD/Peptic Ulcer)  Current Parenteral Nutrition Orders:  Type and Formula: Premix Central (Dex 10% , 4.25% AA)   Lipids: 250ml, Daily  Duration: Cyclic (12 hr)  Rate/Volume: 115 ml/hr from 6680-2238, 205 ml/hr from 4568-8925, 115 ml/hr 0571-2090, then off  Current PN Order Provides: at goal  Goal PN Orders Provides: 1663 kcal/d (100% of needs), 97 grams of protein/d (100% of needs), 228 (5.8 mg/kg/min) grams of CHO/d and ~2530 ml of total volume/d    Current Intake:   Average Meal Intake:  (CLD) Average Supplements Intake: 1-25%      Anthropometric Measures:  Height: 5' 4\" (162.6 cm)  Current Body Wt: 117 lb 8.1 oz (53.3 kg) (3/5), Weight source: Standing Scale  BMI: 20.2, Normal Weight (BMI 18.5-24. 9)  Admission Body Weight: 117 lb 8.1 oz (53.3 kg) (3/2 office PTA)  Ideal Body Weight (Kg) (Calculated): 55 kg (120 lbs), 97.9 %  Usual Body Wt: 126 lb (57.2 kg) (3/16/22 office visit), Percent weight change: -6.7       BMI Category Normal Weight (BMI 18.5-24.9)-weight status potentially skewed by abdominal distention    Estimated Daily Nutrient Needs:  Energy (kcal/day): 9731-2896 (30-35 kcal/kg) (Kcal/kg Weight used: 53.3 kg Current  Protein (g/day): 80-96 (1.5-1.8g/kg) Weight Used: (Current) 53.3 kg  Fluid (ml/day):   (1 ml/kcal)    Nutrition Diagnosis:   Inadequate oral intake related to altered GI structure, altered GI function as evidenced by  (Gastric cancer with peritoneal carcinomatosis, TPN dependant at baseline)    Nutrition Interventions:   Food and/or Nutrient Delivery: Continue Current Diet, Discontinue Oral Nutrition Supplement, Modify Parenteral Nutrition     Coordination of Nutrition Care: Continue to monitor while inpatient       Goals:   Previous Goal Met: Goal(s) Achieved  Active Goal:  (Continued cyclic PN to meet estimated needs)       Nutrition Monitoring and Evaluation:      Food/Nutrient Intake Outcomes: Food and Nutrient Intake, Parenteral Nutrition Intake/Tolerance  Physical Signs/Symptoms Outcomes: Biochemical Data, GI Status, Weight, Fluid Status or Edema    Discharge Planning:    Parenteral Nutrition    [facility] [address], [facility]

## 2023-03-06 NOTE — PLAN OF CARE
Problem: Pain  Goal: Verbalizes/displays adequate comfort level or baseline comfort level  Outcome: Progressing     Problem: Safety - Adult  Goal: Free from fall injury  Outcome: Progressing  Flowsheets (Taken 3/5/2023 2059)  Free From Fall Injury: Instruct family/caregiver on patient safety     Problem: ABCDS Injury Assessment  Goal: Absence of physical injury  Outcome: Progressing  Flowsheets (Taken 3/5/2023 2059)  Absence of Physical Injury: Implement safety measures based on patient assessment

## 2023-03-06 NOTE — CARE COORDINATION
MSN, cM:  patient to be discharged tomorrow per MD.  Patient will discharge with [place] [address] and [address] [place] [address]. [facility] will continue to home TPN. Case Management will continue to follow.

## 2023-03-07 VITALS
WEIGHT: 117.5 LBS | SYSTOLIC BLOOD PRESSURE: 103 MMHG | OXYGEN SATURATION: 97 % | HEIGHT: 64 IN | DIASTOLIC BLOOD PRESSURE: 71 MMHG | RESPIRATION RATE: 15 BRPM | HEART RATE: 85 BPM | BODY MASS INDEX: 20.06 KG/M2 | TEMPERATURE: 98.6 F

## 2023-03-07 LAB
ALBUMIN SERPL-MCNC: 2.1 G/DL (ref 3.5–5)
ALBUMIN/GLOB SERPL: 0.6 (ref 0.4–1.6)
ALP SERPL-CCNC: 84 U/L (ref 50–136)
ALT SERPL-CCNC: 15 U/L (ref 12–65)
ANION GAP SERPL CALC-SCNC: 8 MMOL/L (ref 2–11)
AST SERPL-CCNC: 16 U/L (ref 15–37)
BASOPHILS # BLD: 0 K/UL (ref 0–0.2)
BASOPHILS NFR BLD: 0 % (ref 0–2)
BILIRUB SERPL-MCNC: 0.2 MG/DL (ref 0.2–1.1)
BUN SERPL-MCNC: 20 MG/DL (ref 6–23)
CALCIUM SERPL-MCNC: 8.4 MG/DL (ref 8.3–10.4)
CHLORIDE SERPL-SCNC: 112 MMOL/L (ref 101–110)
CO2 SERPL-SCNC: 20 MMOL/L (ref 21–32)
CREAT SERPL-MCNC: 0.4 MG/DL (ref 0.6–1)
DIFFERENTIAL METHOD BLD: ABNORMAL
EOSINOPHIL # BLD: 0.1 K/UL (ref 0–0.8)
EOSINOPHIL NFR BLD: 1 % (ref 0.5–7.8)
ERYTHROCYTE [DISTWIDTH] IN BLOOD BY AUTOMATED COUNT: 19.9 % (ref 11.9–14.6)
GLOBULIN SER CALC-MCNC: 3.3 G/DL (ref 2.8–4.5)
GLUCOSE SERPL-MCNC: 110 MG/DL (ref 65–100)
HAPTOGLOB SERPL-MCNC: 272 MG/DL (ref 30–200)
HCT VFR BLD AUTO: 25.8 % (ref 35.8–46.3)
HGB BLD-MCNC: 7.6 G/DL (ref 11.7–15.4)
IMM GRANULOCYTES # BLD AUTO: 0.1 K/UL (ref 0–0.5)
IMM GRANULOCYTES NFR BLD AUTO: 1 % (ref 0–5)
LYMPHOCYTES # BLD: 1.3 K/UL (ref 0.5–4.6)
LYMPHOCYTES NFR BLD: 21 % (ref 13–44)
MAGNESIUM SERPL-MCNC: 2.3 MG/DL (ref 1.8–2.4)
MCH RBC QN AUTO: 25.9 PG (ref 26.1–32.9)
MCHC RBC AUTO-ENTMCNC: 29.5 G/DL (ref 31.4–35)
MCV RBC AUTO: 87.8 FL (ref 82–102)
MONOCYTES # BLD: 1.1 K/UL (ref 0.1–1.3)
MONOCYTES NFR BLD: 18 % (ref 4–12)
NEUTS SEG # BLD: 3.7 K/UL (ref 1.7–8.2)
NEUTS SEG NFR BLD: 59 % (ref 43–78)
NRBC # BLD: 0.04 K/UL (ref 0–0.2)
PHOSPHATE SERPL-MCNC: 3.7 MG/DL (ref 2.5–4.5)
PLATELET # BLD AUTO: 270 K/UL (ref 150–450)
PMV BLD AUTO: 9.9 FL (ref 9.4–12.3)
POTASSIUM SERPL-SCNC: 3.9 MMOL/L (ref 3.5–5.1)
PROT SERPL-MCNC: 5.4 G/DL (ref 6.3–8.2)
RBC # BLD AUTO: 2.94 M/UL (ref 4.05–5.2)
SODIUM SERPL-SCNC: 140 MMOL/L (ref 133–143)
TRIGL SERPL-MCNC: 80 MG/DL (ref 35–150)
WBC # BLD AUTO: 6.2 K/UL (ref 4.3–11.1)

## 2023-03-07 PROCEDURE — 84478 ASSAY OF TRIGLYCERIDES: CPT

## 2023-03-07 PROCEDURE — 99239 HOSP IP/OBS DSCHRG MGMT >30: CPT | Performed by: INTERNAL MEDICINE

## 2023-03-07 PROCEDURE — A4216 STERILE WATER/SALINE, 10 ML: HCPCS | Performed by: NURSE PRACTITIONER

## 2023-03-07 PROCEDURE — 85025 COMPLETE CBC W/AUTO DIFF WBC: CPT

## 2023-03-07 PROCEDURE — 2580000003 HC RX 258: Performed by: NURSE PRACTITIONER

## 2023-03-07 PROCEDURE — APPSS180 APP SPLIT SHARED TIME > 60 MINUTES: Performed by: NURSE PRACTITIONER

## 2023-03-07 PROCEDURE — 84100 ASSAY OF PHOSPHORUS: CPT

## 2023-03-07 PROCEDURE — 83735 ASSAY OF MAGNESIUM: CPT

## 2023-03-07 PROCEDURE — 2500000003 HC RX 250 WO HCPCS: Performed by: NURSE PRACTITIONER

## 2023-03-07 PROCEDURE — 80053 COMPREHEN METABOLIC PANEL: CPT

## 2023-03-07 PROCEDURE — C9113 INJ PANTOPRAZOLE SODIUM, VIA: HCPCS | Performed by: NURSE PRACTITIONER

## 2023-03-07 PROCEDURE — 6360000002 HC RX W HCPCS: Performed by: NURSE PRACTITIONER

## 2023-03-07 RX ORDER — ONDANSETRON 8 MG/1
8 TABLET, ORALLY DISINTEGRATING ORAL EVERY 8 HOURS PRN
Qty: 90 TABLET | Refills: 0 | Status: SHIPPED | OUTPATIENT
Start: 2023-03-07

## 2023-03-07 RX ORDER — CYANOCOBALAMIN 1000 UG/ML
1000 INJECTION, SOLUTION INTRAMUSCULAR; SUBCUTANEOUS ONCE
Status: COMPLETED | OUTPATIENT
Start: 2023-03-07 | End: 2023-03-07

## 2023-03-07 RX ORDER — SIMETHICONE 20 MG/.3ML
40 EMULSION ORAL EVERY 6 HOURS PRN
Status: DISCONTINUED | OUTPATIENT
Start: 2023-03-07 | End: 2023-03-07 | Stop reason: HOSPADM

## 2023-03-07 RX ADMIN — METRONIDAZOLE 500 MG: 500 INJECTION, SOLUTION INTRAVENOUS at 01:02

## 2023-03-07 RX ADMIN — SODIUM CHLORIDE, PRESERVATIVE FREE 40 MG: 5 INJECTION INTRAVENOUS at 09:02

## 2023-03-07 RX ADMIN — CEFTRIAXONE SODIUM 2000 MG: 2 INJECTION, POWDER, FOR SOLUTION INTRAMUSCULAR; INTRAVENOUS at 09:01

## 2023-03-07 RX ADMIN — METRONIDAZOLE 500 MG: 500 INJECTION, SOLUTION INTRAVENOUS at 09:02

## 2023-03-07 RX ADMIN — SODIUM CHLORIDE 125 MG: 9 INJECTION, SOLUTION INTRAVENOUS at 10:11

## 2023-03-07 RX ADMIN — PROCHLORPERAZINE EDISYLATE 10 MG: 5 INJECTION INTRAMUSCULAR; INTRAVENOUS at 09:08

## 2023-03-07 RX ADMIN — CYANOCOBALAMIN 1000 MCG: 1000 INJECTION, SOLUTION INTRAMUSCULAR; SUBCUTANEOUS at 12:16

## 2023-03-07 ASSESSMENT — PAIN SCALES - GENERAL
PAINLEVEL_OUTOF10: 0

## 2023-03-07 NOTE — CARE COORDINATION
Patient with discharge orders for today. Patient discharging with [facility] and [facility]. No other needs made known to CM. Patient has met all treatment goals and milestones for discharge. Family to transport patient home. CM following until patient is discharged. ADDENDUM: Referral sent to [facility] for resumption of home TPN.     03/07/23 1246   Service Assessment   Patient Orientation Alert and [place] At/After Discharge   Transition of Care Consult (CM Consult) Discharge Planning   Internal Home Health No   Reason Outside Agency Chosen Patient already serviced by other home [address] Provided? No   Mode of Transport at Discharge Self   Confirm Follow Up Transport Self   Condition of Participation: Discharge Planning   The Plan for Transition of Care is related to the following treatment goals: Return to baseline with home health and palliative care services   The Patient and/or Patient Representative was provided with a Choice of Provider? Patient   The Patient and/Or Patient Representative agree with the Discharge Plan? Yes   Freedom of Choice list was provided with basic dialogue that supports the patient's individualized plan of care/goals, treatment preferences, and shares the quality data associated with the providers?   Yes

## 2023-03-07 NOTE — DISCHARGE SUMMARY
[facility] Hematology & Oncology: Inpatient Hematology / Oncology Discharge Summary Note    Patient ID:  Kayla Espinosa  [id number]  68 y.o.  1975    Admit Date: 3/2/2023    Discharge Date: 3/7/2023    Admission Diagnoses: Intractable nausea and vomiting [R11.2]    Discharge Diagnoses:  Principal Diagnosis: Intractable nausea and vomiting  Principal Problem:    Intractable nausea and vomiting  Active Problems:    Localized edema    Abnormal CT of the abdomen    Immunocompromised ([address]  Resolved Problems:    * No resolved hospital problems. [facility] Course:  Ms. Emily Murphy is a 50year old female admitted on 3/2/2023 with intractable abdominal pain, nausea, and vomiting. She is a patient of Dr. Brina Key with metastatic carcinoma of unknown primary s/p C5D15 Cyramza/Taxol on 2/16. She presented to clinic for consideration of C6 and c/o intractable abd pain, N/V. Vomitus is bilious and recurrent associated with retching and worsening of abdominal pain. She cannot tolerate oral medications and is dependent on TPN for her nutritional needs. She feels very weak and finds it difficult to speak. Denies fevers, chills, chest pain or cough. Denies diarrhea or abdominal distention. She was admitted for further evaluation and management. See course of hospitalization below. CT AP with sm-mod volume of abdominopelvic free fluid ?peritonitis and/or carcinomatosis; wall thickening of GB; acute pyelonephritis; wall thickening of urinary bladder; and colitis/  Bcx-NGTD. Ucx with MSF. She was treated with 5 days of CTX/Flagyl, per Up to date review, this is adequate treatment and pt will not have to continue antibx at discharge. She is Fe deficient with Tsat 9% and ferritin 58. Ferrlecit x 1 ordered. Vit B12 low normal at 271. B12 injection 1000mcg x 1 ordered. She will need weekly B12 injections. No Vit D or folate deficiencies noted. Hapto pending. Hgb stable at 7.6.   She is feeling better and is ready for discharge home with [facility] services. She will continue TPN at discharge. She is scheduled to f/u with NP on 3/9. Advised to call with fever, chills, uncontrollable symptoms, or with any other concerns. Metastatic carcinoma of unknown primary  - s/p C5D15 Cyramza/Taxol on 2/16. C6 due 3/2 - held d/t pt condition     Intractable abd pain / N/V / ? Peritonitis / Colitis / Acute Pyelonephritis  - Check CT AP  3/3 CT AP with sm-mod volume of abdominopelvic free fluid ?peritonitis and/or carcinomatosis; wall thickening of GB; acute pyelonephritis; wall thickening of urinary bladder; and colitis. Check Bcx/Ucx. Start CTX/Flagyl. 3/4 Afeb, nausea and pain are improving, BC/UC -NGTD, continue CTX/Flagyl, OK to drain PleurX prn  3/5 Afeb, minimal nausea/pain now, BC-NGTD, UC with 50-100K colonies normal skin alberta, remains on CTX/Flagyl, PleurX drained yesterday ~250ml  3/6 Nausea and abd pain improved. Pt requests to advance diet, order placed. Remains afebrile. Bcx-NGTD. Con't CTX/Flagyl. Nutrition  - Consult RD to con't TPN     Anemia secondary to chemotherapy  - Tranfuse prn per Andres SOPs  3/3 Transfuse 1 unit PRBCs  3/4 Hgb 7.9  3/5 Hgb 7.6  3/6 Hgb 7.3. Check nutritional studies and hemolysis labs. Constipation  3/3 Start pericolace BID  3/6 Reports BM x 2 yesterday  RESOLVED     Edema  3/6 Pt states she feels swollen. BLE edema noted. Lasix 20mg IV x 1 ordered.        Consults:  IP CONSULT TO DIETITIAN  IP CONSULT HOME HEALTH  IP CONSULT TO CASE MANAGEMENT    Pertinent Diagnostic Studies:   Labs:    Recent Labs     03/05/23 0357 03/06/23 0328 03/07/23  0423   WBC 5.8 5.5 6.2   HGB 7.6* 7.3* 7.6*    255 270      Recent Labs     03/05/23  0357 03/06/23 0328 03/07/23  0423    139 140   K 4.3 4.0 3.9   * 113* 112*   CO2 23 23 20*   BUN 26* 22 20   MG 1.8 1.6* 2.3   PHOS 4.7* 3.6 3.7       Imaging:  CT Result (most recent):  CT ABDOMEN PELVIS W IV CONTRAST 03/02/2023    Narrative  EXAM: CT OF THE ABDOMEN AND PELVIS WITH IV CONTRAST    INDICATIONS: Nausea, vomiting, abdominal pain    TECHNIQUE: CT scan of the abdomen and pelvis was performed following the  administration of intravenous contrast. CT dose lowering techniques were used,  to include: automated exposure control, adjustment for patient size, and / or  use of iterative reconstruction. COMPARISON: 12/21/2022    FINDINGS:  Bones: No destructive osseous lesions. Lung bases: Unremarkable    ABDOMEN:  Liver: There is scalloping of the hepatic capsule by the adjacent fluid. The  portal venous system is patent. Gallbladder and Bile Ducts: There is wall thickening of the gallbladder. There  is no intrahepatic or extrahepatic biliary ductal dilatation. Spleen: A hypoattenuating structure in the posterior spleen is too small to  characterize. Pancreas: The pancreatic parenchyma is unremarkable. There is no pancreatic  ductal dilatation. Adrenals: Unremarkable without nodularity. Kidneys: A right-sided double-J nephroureteral stent is present and appears  appropriately positioned. An extrarenal pelvis is present on the right. There  are some patchy regions of hypoenhancement in the right kidney. No focal lesion  is demonstrated. Vasculature: No evidence of atherosclerosis or aneurysm. Nodes: There is no lymphadenopathy by size criteria. Bowel: There is no bowel obstruction. Enteric contrast reaches the splenic  flexure. Status post sleeve gastrectomy. No extraluminal contrast is seen. The  appendix is unremarkable. There is wall thickening of the colon along its  length. Mesentery/Peritoneum: There is a small to moderate volume of abdominopelvic free  fluid. There is associated enhancement of the peritoneal surfaces. A right  lower quadrant approach drain terminates in the eyad hepatis. Soft Tissues: Unremarkable    PELVIS:  Pelvic Organs: Status post hysterectomy.  There is wall thickening of the urinary  bladder. Impression  1. Small to moderate volume of abdominopelvic free fluid. There is associated  enhancement of the peritoneal surfaces. This may represent peritonitis and/or  carcinomatosis. 2. Wall thickening of the gallbladder. This is highly nonspecific and may be  reactive to the fluid. If there is clinical suspicion for cystic duct  obstruction, CT would be recommended. 3. Some patchy regions of hypoenhancement in the right kidney, compatible with  acute pyelonephritis. 4. Wall thickening of the urinary bladder. Please correlate with urinalysis. 5. Wall thickening of the colon along its length. This may be related to  relative underdistention or colitis. 6. Additional chronic findings as above.     Alexx Mixon M.D.  3/2/2023 8:41:00 PM         Medication List        START taking these medications      ondansetron 8 MG Tbdp disintegrating tablet  Commonly known as: ZOFRAN-ODT  Place 1 tablet under the tongue every 8 hours as needed for Nausea or Vomiting            CONTINUE taking these medications      lidocaine-prilocaine 2.5-2.5 % cream  Commonly known as: EMLA     pantoprazole 40 MG tablet  Commonly known as: PROTONIX  Take 1 tablet by mouth in the morning and at bedtime     prochlorperazine 10 MG tablet  Commonly known as: COMPAZINE  Take 1 tablet by mouth every 6 hours as needed (nausea)            STOP taking these medications      ondansetron 8 MG tablet  Commonly known as: Pedro Palumbo your doctor about these medications      metoclopramide 5 MG tablet  Commonly known as: Reglan  Take 1 tablet by mouth 3 times daily     vitamin D 1.25 MG (07426 UT) Caps capsule  Commonly known as: ERGOCALCIFEROL  Take 1 capsule by mouth every 7 days               Where to Get Your Medications        These medications were sent to [id number] [address] [facility] [place] [facility] [address]      Phone: [phone]   ondansetron 8 MG Tbdp disintegrating tablet           OBJECTIVE:  Patient Vitals for the past 8 hrs:   BP Temp Temp src Pulse SpO2   23 1057 103/71 -- -- 85 97 %   23 0753 114/87 98.1 °F (36.7 °C) Oral (!) 114 98 %     Temp (24hrs), Av.2 °F (36.8 °C), Min:98 °F (36.7 °C), Max:98.6 °F (37 °C)    No intake/output data recorded.    Physical Exam:  Constitutional: Well developed female in no acute distress, lying comfortably in the hospital bed.    HEENT: Normocephalic and atraumatic.  Sclerae anicteric. Neck supple without JVD. No thyromegaly present.    Skin Warm and dry.  No bruising and no rash noted.  No erythema.  No pallor.    Respiratory Lungs are clear to auscultation bilaterally without wheezes, rales or rhonchi, normal air exchange without accessory muscle use.    CVS Normal rate, regular rhythm and normal S1 and S2.  No murmurs, gallops, or rubs.   Abdomen Mildly firm and distended, normoactive bowel sounds. +Pleurx.   Neuro Grossly nonfocal with no obvious sensory or motor deficits.   MSK Normal range of motion in general.  No edema and no tenderness.   Psych Appropriate mood and affect.        ASSESSMENT:    Principal Problem:    Intractable nausea and vomiting  Active Problems:    Localized edema    Abnormal CT of the abdomen    Immunocompromised (HCC)  Resolved Problems:    * No resolved hospital problems. *      DISPOSITION:  Discharging home with  services.  Follow up with NP as scheduled on 3/9.      Over 45 minutes was spent in discharge planning and coordination of care.            SCOTT Perez - CNP  [facility] Hematology & Oncology  [address] [place] [address]  Office : [phone]  Fax : [phone]

## 2023-03-07 NOTE — PLAN OF CARE
Problem: Pain  Goal: Verbalizes/displays adequate comfort level or baseline comfort level  Outcome: Adequate for Discharge     Problem: Safety - Adult  Goal: Free from fall injury  Outcome: Adequate for Discharge     Problem: ABCDS Injury Assessment  Goal: Absence of physical injury  Outcome: Adequate for Discharge

## 2023-03-07 NOTE — PROGRESS NOTES
Comprehensive Nutrition Assessment    Type and Reason for Visit: Reassess  TPN Management (Oncology)    Nutrition Recommendations/Plan:   Parenteral Nutrition: Resume home PN as per [facility] home infusion company. Meals and Snacks:  Diet: Continue current order. Po is for pleasure  Nutrition Supplement Therapy:  Medical food supplement therapy:  Defer d/t PN is primary source of nutrition     Malnutrition Assessment:3/2  Malnutrition Status: Insufficient data (previous severe malnutrition, NFPE deferred thi date, has been gaining weight on home PN)    No amie wasting  Nutrition Assessment:  Nutrition History: 3/2: Patient known to RD. Eating normally 3 meals per day prior to 9/2022 when she presented with N/V and abdominal distention. She was discharged on a regular diet with Ensure Clear. She presented back 10/2022 with N/V and was eventually started on TPN and discharged on TPN. She remains TPN for primary needs. Weight has been trending up per outpatient office visits. Followed by outpatient RD and RD with [facility]. 3/3: Spoke with [facility] RDKeisha. Patient is on 12 hr cyclic infusion. PN volume 2.3L/d (85 g pro, 50 g fat, 220 g CHO). She states that she has been stable on 130 meq/l Na.   3/6: Pt eats what she wants at home including solid food. Intake is <25%/bites and for pleasure     Do You Have Any Cultural, Gnosticist, or Ethnic Food Preferences?: No   Nutrition Background:       Patient with PMH significant for gastric sleeve, hydronephrosis with stents and recent right stent exchange (left unable to be exchanged due to tumor), metastatic adeno with suspected GI origin with peritoneal carcinomatosis on diagnosis, debulking unsuccessful, recent GIB, DVT, Pleurx placement 9/26/22. She presented with abdominal pain, difficulty swallowing \"stuck in throat and vomits. \" She was supposed to have outpt EGD but presented to ER with intractable nausea and vomiting. s/p EGD 10/21 with findings of LA grade D esophagitis, proximal esophageal stricture, Sckatzki's ring. She presented for chemo. She was a direct admit from cancer clinic for intractable N/V and worsening abdominal pain. CT scan showing diffuse changes potentially consistent with inflammatory processes in her colon, kidney, and biliary system. It is perhaps more likely that her disseminated carcinomatosis is now infiltrating all of these organs  Nutrition Interval:  TPN without lipids initiated 3/2 via port. CTAP with contrast 3/2 showed enhancement of peritoneal surfaces that may represent peritonitis and/or carcinomatosis. TPN transitioned to 12 hr cyclic infusion per home regimen 3/3. Daily lipids started 3/4    3/7: Patient seen lying up in bed. She reports she ate a few bits of rice at lunch and dinner yesterday and had a few sips of Ensure clear in the evening. She has N/V at ~0100 which she says was all bile. She reports plan for pleurX drain today with last drainage 2 days ago. She reports output from drain varies from 50 to 150 ml. Pt  reports she is going [facility]. Discussed pt with RN Prakash Calvo who confirms plan for discharge today.   Abdominal Status (last documented by RN):   Last BM (including prior to admit): 03/06/23  Abdomen Inspection: Distended, Tympanic, RUQ Bowel Sounds: Hypoactive, LUQ Bowel Sounds: Hypoactive, RLQ Bowel Sounds: Hypoactive, LLQ Bowel Sounds: Hypoactive 240 ml of emesis documented for 3/6   Pertinent Medications: Protonix, rocephin, flagyl, senokot (refused last 4 doses), ferrlicit  Continuous: none  IVF: none  Electrolyte Replacement:  3/2: 2 gram Mg  PRN: Zofran (last admin 3/6), Glycolax, compazine (last admin 3/7)  Pertinent Labs:   Lab Results   Component Value Date/Time     03/07/2023 04:23 AM    K 3.9 03/07/2023 04:23 AM     03/07/2023 04:23 AM    CO2 20 03/07/2023 04:23 AM    BUN 20 03/07/2023 04:23 AM    CREATININE 0.40 03/07/2023 04:23 AM GLUCOSE 110 03/07/2023 04:23 AM    CALCIUM 8.4 03/07/2023 04:23 AM    PHOS 3.7 03/07/2023 04:23 AM    MG 2.3 03/07/2023 04:23 AM     Lab Results   Component Value Date/Time    TRIG 80 03/07/2023 04:23 AM    TRIG 95 03/05/2023 03:54 AM    TRIG 69 03/04/2023 03:32 AM     Remarkable for:   K with trend down over past 3 days. Had increased K losses with one time dose of lasix  CL up but Na and CO2  low normal.  Magnesium WDL after bolus 3/6. Medina Copping Mg increased in PN to 34 meq/d 3/6. Phos-WDL with Phos decreased in TPN to 11.5 mmole/d on 3/5  Current Nutrition Therapies:  ADULT DIET; Regular; GI [place] (GERD/Peptic Ulcer)  PN-Adult Premix 4.25/10 - Central Line  Current Parenteral Nutrition Orders:  Type and Formula: Premix Central (Dex 10% , 4.25% AA)   Lipids: 250ml, Daily  Duration: Cyclic (12 hr)  Rate/Volume: 115 ml/hr from 1134-4557, 205 ml/hr from 0287-7179, 115 ml/hr 4538-3063, then off Total volume of 2.28 L of Dex/AA or 2.53L with lipids  Current PN Order Provides: at goal  Goal PN Orders Provides: 1663 kcal/d (100% of needs), 97 grams of protein/d (100% of needs), 228 (5.8 mg/kg/min) grams of CHO/d and ~2530 ml of total volume/d    Current Intake:   Average Meal Intake: 1-25% (po is for pleasure) Average Supplements Intake: 1-25%      Anthropometric Measures:  Height: 5' 4\" (162.6 cm)  Current Body Wt: 117 lb 8.1 oz (53.3 kg) (3/6), Weight source: Standing Scale  BMI: 20.2, Normal Weight (BMI 18.5-24. 9)  Admission Body Weight: 117 lb 8.1 oz (53.3 kg) (3/2 office PTA)  Ideal Body Weight (Kg) (Calculated): 55 kg (120 lbs), 97.9 %  Usual Body Wt: 126 lb (57.2 kg) (3/16/22 office visit), Percent weight change: -6.7       BMI Category Normal Weight (BMI 18.5-24.9)-weight status potentially skewed by abdominal distention/has pleurX that is used for drainage prn    Estimated Daily Nutrient Needs:  Energy (kcal/day): 8224-7931 (30-35 kcal/kg) (Kcal/kg Weight used: 53.3 kg Current  Protein (g/day): 80-96 (1.5-1.8g/kg) Weight Used: (Current) 53.3 kg  Fluid (ml/day):   (1 ml/kcal)    Nutrition Diagnosis:   Inadequate oral intake related to altered GI structure, altered GI function as evidenced by  (Gastric cancer with peritoneal carcinomatosis, TPN dependant at baseline)    Nutrition Interventions:   Food and/or Nutrient Delivery: Continue Current Diet (TPN per [facility] home infusion)     Coordination of Nutrition Care: Continue to monitor while inpatient       Goals:   Previous Goal Met: Goal(s) Achieved  Active Goal:  (Continued cyclic PN to meet estimated needs)       Nutrition Monitoring and Evaluation:      Food/Nutrient Intake Outcomes: Food and Nutrient Intake, Parenteral Nutrition Intake/Tolerance  Physical Signs/Symptoms Outcomes: Biochemical Data, GI Status, Weight, Fluid Status or Edema    Discharge Planning:    Parenteral Nutrition    [facility], [address] [facility]

## 2023-03-07 NOTE — PROGRESS NOTES
Physician Progress Note      PATIENT:               Meka Ortiz  CSN #:                  [id number]  :                       1975  ADMIT DATE:       3/2/2023 12:30 PM  [address] [place] DATE:  Tiffanie Dejesus  PROVIDER #:        Jacob Richter NP          QUERY TEXT:    Pt admitted with intractable abdominal pain, nausea and vomiting. Noted on the   CT ABD/Pelvis Small to moderate volume of abdominopelvic free fluid. There is   associated enhancement of the peritoneal surfaces. This may represent   peritonitis and/or  carcinomatosis, Wall thickening of the colon along its length. This may be   related to relative underdistention or colitis  If possible, please document in progress notes and discharge summary if you   are evaluating and /or treating any of the following: The medical record reflects the following:  Risk Factors: 50 yr, Metastatic carcinoma, TPN    Clinical Indicators: CT AP showed sm-mod volume of abdominopelvic free fluid   possible peritonitis and/or carcinomatosis, wall thickening of GB; acute   pyelonephritis; wall thickening of urinary bladder, and colitis, BC/UC NGTD,   Constipation. per documentation on 3/3 PN CT scan showing diffuse changes   potentially consistent with inflammatory processes in her colon, kidney, and   biliary system, WBC 5.1    Treatment: IV Rocephin, IV Flagyl, pericolace BID, compazine  Options provided:  -- Abdominal Pain, Nausea and Vomiting due to, Please specify cause.   -- Bacterial Colitis  -- Other - I will add my own diagnosis  -- Disagree - Not applicable / Not valid  -- Disagree - Clinically unable to determine / Unknown  -- Refer to Clinical Documentation Reviewer    PROVIDER RESPONSE TEXT:    Patient has abdominal Pain, Nausea and Vomiting due to possible peritonitis,   carcinomatosis, acute pyelonephritis, and colitis    Query created by: Ramon Montague on 3/7/2023 11:59 AM      Electronically signed by:  Jacob Richter NP 3/7/2023 12:11 PM

## 2023-03-08 ENCOUNTER — CARE COORDINATION (OUTPATIENT)
Dept: CARE COORDINATION | Facility: CLINIC | Age: 48
End: 2023-03-08

## 2023-03-08 DIAGNOSIS — C48.2 PERITONEAL CARCINOMA (HCC): Primary | ICD-10-CM

## 2023-03-08 DIAGNOSIS — Z79.899 HIGH RISK MEDICATION USE: ICD-10-CM

## 2023-03-08 LAB
BACTERIA SPEC CULT: NORMAL
BACTERIA SPEC CULT: NORMAL
SERVICE CMNT-IMP: NORMAL
SERVICE CMNT-IMP: NORMAL

## 2023-03-09 ENCOUNTER — CLINICAL DOCUMENTATION (OUTPATIENT)
Dept: CASE MANAGEMENT | Age: 48
End: 2023-03-09

## 2023-03-09 ENCOUNTER — CARE COORDINATION (OUTPATIENT)
Dept: CARE COORDINATION | Facility: CLINIC | Age: 48
End: 2023-03-09

## 2023-03-09 ENCOUNTER — HOSPITAL ENCOUNTER (OUTPATIENT)
Dept: INFUSION THERAPY | Age: 48
Discharge: HOME | End: 2023-03-09
Payer: COMMERCIAL

## 2023-03-09 ENCOUNTER — HOSPITAL ENCOUNTER (OUTPATIENT)
Dept: GENERAL RADIOLOGY | Age: 48
Discharge: HOME | End: 2023-03-12

## 2023-03-09 ENCOUNTER — OFFICE VISIT (OUTPATIENT)
Dept: ONCOLOGY | Age: 48
End: 2023-03-09
Payer: COMMERCIAL

## 2023-03-09 VITALS
RESPIRATION RATE: 18 BRPM | HEIGHT: 64 IN | SYSTOLIC BLOOD PRESSURE: 106 MMHG | TEMPERATURE: 98.4 F | OXYGEN SATURATION: 98 % | DIASTOLIC BLOOD PRESSURE: 82 MMHG | BODY MASS INDEX: 21.21 KG/M2 | HEART RATE: 100 BPM | WEIGHT: 124.2 LBS

## 2023-03-09 DIAGNOSIS — C80.1 CARCINOMA OF UNKNOWN PRIMARY (HCC): Primary | ICD-10-CM

## 2023-03-09 DIAGNOSIS — C78.6 METASTASIS TO PERITONEUM OF UNKNOWN PRIMARY (HCC): ICD-10-CM

## 2023-03-09 DIAGNOSIS — R53.83 FATIGUE DUE TO TREATMENT: ICD-10-CM

## 2023-03-09 DIAGNOSIS — Z79.899 HIGH RISK MEDICATION USE: ICD-10-CM

## 2023-03-09 DIAGNOSIS — C80.1 CARCINOMA OF UNKNOWN PRIMARY (HCC): ICD-10-CM

## 2023-03-09 DIAGNOSIS — R14.0 ABDOMINAL DISTENSION: ICD-10-CM

## 2023-03-09 DIAGNOSIS — C80.1 METASTASIS TO PERITONEUM OF UNKNOWN PRIMARY (HCC): ICD-10-CM

## 2023-03-09 DIAGNOSIS — C48.2 PERITONEAL CARCINOMA (HCC): ICD-10-CM

## 2023-03-09 DIAGNOSIS — C78.6 PERITONEAL CARCINOMATOSIS (HCC): ICD-10-CM

## 2023-03-09 DIAGNOSIS — Z78.9 ON TOTAL PARENTERAL NUTRITION (TPN): ICD-10-CM

## 2023-03-09 LAB
ALBUMIN SERPL-MCNC: 2.5 G/DL (ref 3.5–5)
ALBUMIN/GLOB SERPL: 0.7 (ref 0.4–1.6)
ALP SERPL-CCNC: 93 U/L (ref 50–136)
ALT SERPL-CCNC: 15 U/L (ref 12–65)
AMORPH CRY URNS QL MICRO: ABNORMAL
ANION GAP SERPL CALC-SCNC: 3 MMOL/L (ref 2–11)
APPEARANCE UR: ABNORMAL
AST SERPL-CCNC: 15 U/L (ref 15–37)
BACTERIA URNS QL MICRO: ABNORMAL /HPF
BASOPHILS # BLD: 0 K/UL (ref 0–0.2)
BASOPHILS NFR BLD: 0 % (ref 0–2)
BILIRUB SERPL-MCNC: 0.3 MG/DL (ref 0.2–1.1)
BILIRUB UR QL: NEGATIVE
BUN SERPL-MCNC: 19 MG/DL (ref 6–23)
CALCIUM SERPL-MCNC: 8.7 MG/DL (ref 8.3–10.4)
CASTS URNS QL MICRO: 0 /LPF
CHLORIDE SERPL-SCNC: 105 MMOL/L (ref 101–110)
CO2 SERPL-SCNC: 30 MMOL/L (ref 21–32)
COLOR UR: YELLOW
CREAT SERPL-MCNC: 0.4 MG/DL (ref 0.6–1)
CREAT UR-MCNC: 46 MG/DL
CRYSTALS URNS QL MICRO: 0 /LPF
DIFFERENTIAL METHOD BLD: ABNORMAL
EOSINOPHIL # BLD: 0 K/UL (ref 0–0.8)
EOSINOPHIL NFR BLD: 0 % (ref 0.5–7.8)
EPI CELLS #/AREA URNS HPF: ABNORMAL /HPF
ERYTHROCYTE [DISTWIDTH] IN BLOOD BY AUTOMATED COUNT: 20 % (ref 11.9–14.6)
GLOBULIN SER CALC-MCNC: 3.7 G/DL (ref 2.8–4.5)
GLUCOSE SERPL-MCNC: 99 MG/DL (ref 65–100)
GLUCOSE UR STRIP.AUTO-MCNC: NEGATIVE MG/DL
HCT VFR BLD AUTO: 30.9 % (ref 35.8–46.3)
HGB BLD-MCNC: 9 G/DL (ref 11.7–15.4)
HGB UR QL STRIP: ABNORMAL
IMM GRANULOCYTES # BLD AUTO: 0 K/UL (ref 0–0.5)
IMM GRANULOCYTES NFR BLD AUTO: 0 % (ref 0–5)
KETONES UR QL STRIP.AUTO: NEGATIVE MG/DL
LEUKOCYTE ESTERASE UR QL STRIP.AUTO: ABNORMAL
LYMPHOCYTES # BLD: 1.2 K/UL (ref 0.5–4.6)
LYMPHOCYTES NFR BLD: 15 % (ref 13–44)
MAGNESIUM SERPL-MCNC: 2.1 MG/DL (ref 1.8–2.4)
MCH RBC QN AUTO: 25.7 PG (ref 26.1–32.9)
MCHC RBC AUTO-ENTMCNC: 29.1 G/DL (ref 31.4–35)
MCV RBC AUTO: 88.3 FL (ref 82–102)
MONOCYTES # BLD: 1.4 K/UL (ref 0.1–1.3)
MONOCYTES NFR BLD: 17 % (ref 4–12)
MUCOUS THREADS URNS QL MICRO: ABNORMAL /LPF
NEUTS SEG # BLD: 5.4 K/UL (ref 1.7–8.2)
NEUTS SEG NFR BLD: 68 % (ref 43–78)
NITRITE UR QL STRIP.AUTO: NEGATIVE
NRBC # BLD: 0 K/UL (ref 0–0.2)
PH UR STRIP: 6.5 (ref 5–9)
PHOSPHATE SERPL-MCNC: 3.9 MG/DL (ref 2.5–4.5)
PLATELET # BLD AUTO: 310 K/UL (ref 150–450)
PMV BLD AUTO: 10.4 FL (ref 9.4–12.3)
POTASSIUM SERPL-SCNC: 4.3 MMOL/L (ref 3.5–5.1)
PROT SERPL-MCNC: 6.2 G/DL (ref 6.3–8.2)
PROT UR STRIP-MCNC: 100 MG/DL
PROT UR-MCNC: 52 MG/DL
PROT/CREAT UR-RTO: 1.1
RBC # BLD AUTO: 3.5 M/UL (ref 4.05–5.2)
RBC #/AREA URNS HPF: >100 /HPF
SODIUM SERPL-SCNC: 138 MMOL/L (ref 133–143)
SP GR UR REFRACTOMETRY: 1.02 (ref 1–1.02)
UROBILINOGEN UR QL STRIP.AUTO: 0.2 EU/DL
WBC # BLD AUTO: 7.9 K/UL (ref 4.3–11.1)
WBC URNS QL MICRO: >100 /HPF
YEAST URNS QL MICRO: ABNORMAL

## 2023-03-09 PROCEDURE — 87106 FUNGI IDENTIFICATION YEAST: CPT

## 2023-03-09 PROCEDURE — A4216 STERILE WATER/SALINE, 10 ML: HCPCS | Performed by: NURSE PRACTITIONER

## 2023-03-09 PROCEDURE — 80053 COMPREHEN METABOLIC PANEL: CPT

## 2023-03-09 PROCEDURE — 81001 URINALYSIS AUTO W/SCOPE: CPT

## 2023-03-09 PROCEDURE — 6360000002 HC RX W HCPCS: Performed by: NURSE PRACTITIONER

## 2023-03-09 PROCEDURE — 2580000003 HC RX 258: Performed by: NURSE PRACTITIONER

## 2023-03-09 PROCEDURE — 2500000003 HC RX 250 WO HCPCS: Performed by: NURSE PRACTITIONER

## 2023-03-09 PROCEDURE — 36591 DRAW BLOOD OFF VENOUS DEVICE: CPT

## 2023-03-09 PROCEDURE — 87086 URINE CULTURE/COLONY COUNT: CPT

## 2023-03-09 PROCEDURE — 96375 TX/PRO/DX INJ NEW DRUG ADDON: CPT

## 2023-03-09 PROCEDURE — 84100 ASSAY OF PHOSPHORUS: CPT

## 2023-03-09 PROCEDURE — 85025 COMPLETE CBC W/AUTO DIFF WBC: CPT

## 2023-03-09 PROCEDURE — 96417 CHEMO IV INFUS EACH ADDL SEQ: CPT

## 2023-03-09 PROCEDURE — 2580000003 HC RX 258: Performed by: INTERNAL MEDICINE

## 2023-03-09 PROCEDURE — 99214 OFFICE O/P EST MOD 30 MIN: CPT | Performed by: NURSE PRACTITIONER

## 2023-03-09 PROCEDURE — 84156 ASSAY OF PROTEIN URINE: CPT

## 2023-03-09 PROCEDURE — 96413 CHEMO IV INFUSION 1 HR: CPT

## 2023-03-09 PROCEDURE — 83735 ASSAY OF MAGNESIUM: CPT

## 2023-03-09 RX ORDER — ACETAMINOPHEN 325 MG/1
650 TABLET ORAL
Status: CANCELLED | OUTPATIENT
Start: 2023-03-09

## 2023-03-09 RX ORDER — SODIUM CHLORIDE 9 MG/ML
INJECTION, SOLUTION INTRAVENOUS CONTINUOUS
Status: CANCELLED | OUTPATIENT
Start: 2023-03-09

## 2023-03-09 RX ORDER — HEPARIN SODIUM (PORCINE) LOCK FLUSH IV SOLN 100 UNIT/ML 100 UNIT/ML
500 SOLUTION INTRAVENOUS PRN
Status: CANCELLED | OUTPATIENT
Start: 2023-03-09

## 2023-03-09 RX ORDER — FAMOTIDINE 10 MG/ML
20 INJECTION, SOLUTION INTRAVENOUS
Status: CANCELLED | OUTPATIENT
Start: 2023-03-09

## 2023-03-09 RX ORDER — SODIUM CHLORIDE 9 MG/ML
5-250 INJECTION, SOLUTION INTRAVENOUS PRN
Status: CANCELLED | OUTPATIENT
Start: 2023-03-09

## 2023-03-09 RX ORDER — ONDANSETRON 2 MG/ML
8 INJECTION INTRAMUSCULAR; INTRAVENOUS ONCE
Status: CANCELLED | OUTPATIENT
Start: 2023-03-09 | End: 2023-03-09

## 2023-03-09 RX ORDER — ONDANSETRON 2 MG/ML
8 INJECTION INTRAMUSCULAR; INTRAVENOUS
Status: CANCELLED | OUTPATIENT
Start: 2023-03-09

## 2023-03-09 RX ORDER — SODIUM CHLORIDE 9 MG/ML
5-250 INJECTION, SOLUTION INTRAVENOUS PRN
Status: DISCONTINUED | OUTPATIENT
Start: 2023-03-09 | End: 2023-03-10 | Stop reason: HOSPADM

## 2023-03-09 RX ORDER — FAMOTIDINE 10 MG/ML
20 INJECTION, SOLUTION INTRAVENOUS ONCE
Status: CANCELLED | OUTPATIENT
Start: 2023-03-09 | End: 2023-03-09

## 2023-03-09 RX ORDER — SODIUM CHLORIDE 9 MG/ML
5-40 INJECTION INTRAVENOUS PRN
Status: CANCELLED | OUTPATIENT
Start: 2023-03-09

## 2023-03-09 RX ORDER — DIPHENHYDRAMINE HYDROCHLORIDE 50 MG/ML
50 INJECTION INTRAMUSCULAR; INTRAVENOUS ONCE
Status: CANCELLED | OUTPATIENT
Start: 2023-03-09 | End: 2023-03-09

## 2023-03-09 RX ORDER — MEPERIDINE HYDROCHLORIDE 50 MG/ML
12.5 INJECTION INTRAMUSCULAR; INTRAVENOUS; SUBCUTANEOUS PRN
Status: CANCELLED | OUTPATIENT
Start: 2023-03-09

## 2023-03-09 RX ORDER — DIPHENHYDRAMINE HYDROCHLORIDE 50 MG/ML
50 INJECTION INTRAMUSCULAR; INTRAVENOUS
Status: CANCELLED | OUTPATIENT
Start: 2023-03-09

## 2023-03-09 RX ORDER — ONDANSETRON 2 MG/ML
8 INJECTION INTRAMUSCULAR; INTRAVENOUS ONCE
Status: COMPLETED | OUTPATIENT
Start: 2023-03-09 | End: 2023-03-09

## 2023-03-09 RX ORDER — DEXAMETHASONE SODIUM PHOSPHATE 10 MG/ML
10 INJECTION INTRAMUSCULAR; INTRAVENOUS ONCE
Status: COMPLETED | OUTPATIENT
Start: 2023-03-09 | End: 2023-03-09

## 2023-03-09 RX ORDER — ALBUTEROL SULFATE 90 UG/1
4 AEROSOL, METERED RESPIRATORY (INHALATION) PRN
Status: CANCELLED | OUTPATIENT
Start: 2023-03-09

## 2023-03-09 RX ORDER — SODIUM CHLORIDE 0.9 % (FLUSH) 0.9 %
5-40 SYRINGE (ML) INJECTION PRN
Status: CANCELLED | OUTPATIENT
Start: 2023-03-09

## 2023-03-09 RX ORDER — SODIUM CHLORIDE 0.9 % (FLUSH) 0.9 %
5-40 SYRINGE (ML) INJECTION PRN
Status: DISCONTINUED | OUTPATIENT
Start: 2023-03-09 | End: 2023-03-10 | Stop reason: HOSPADM

## 2023-03-09 RX ORDER — SODIUM CHLORIDE 0.9 % (FLUSH) 0.9 %
10 SYRINGE (ML) INJECTION PRN
Status: DISCONTINUED | OUTPATIENT
Start: 2023-03-09 | End: 2023-03-10 | Stop reason: HOSPADM

## 2023-03-09 RX ORDER — DIPHENHYDRAMINE HYDROCHLORIDE 50 MG/ML
50 INJECTION INTRAMUSCULAR; INTRAVENOUS ONCE
Status: COMPLETED | OUTPATIENT
Start: 2023-03-09 | End: 2023-03-09

## 2023-03-09 RX ORDER — EPINEPHRINE 1 MG/ML
0.3 INJECTION, SOLUTION, CONCENTRATE INTRAVENOUS PRN
Status: CANCELLED | OUTPATIENT
Start: 2023-03-09

## 2023-03-09 RX ADMIN — SODIUM CHLORIDE 50 ML/HR: 9 INJECTION, SOLUTION INTRAVENOUS at 12:55

## 2023-03-09 RX ADMIN — SODIUM CHLORIDE, PRESERVATIVE FREE 10 ML: 5 INJECTION INTRAVENOUS at 16:15

## 2023-03-09 RX ADMIN — FAMOTIDINE 20 MG: 10 INJECTION, SOLUTION INTRAVENOUS at 14:36

## 2023-03-09 RX ADMIN — SODIUM CHLORIDE, PRESERVATIVE FREE 10 ML: 5 INJECTION INTRAVENOUS at 12:10

## 2023-03-09 RX ADMIN — PACLITAXEL 120 MG: 6 INJECTION, SOLUTION, CONCENTRATE INTRAVENOUS at 15:08

## 2023-03-09 RX ADMIN — DEXAMETHASONE SODIUM PHOSPHATE 10 MG: 10 INJECTION INTRAMUSCULAR; INTRAVENOUS at 14:38

## 2023-03-09 RX ADMIN — DIPHENHYDRAMINE HYDROCHLORIDE 50 MG: 50 INJECTION, SOLUTION INTRAMUSCULAR; INTRAVENOUS at 13:02

## 2023-03-09 RX ADMIN — SODIUM CHLORIDE 386 MG: 9 INJECTION, SOLUTION INTRAVENOUS at 13:30

## 2023-03-09 RX ADMIN — ONDANSETRON 8 MG: 2 INJECTION INTRAMUSCULAR; INTRAVENOUS at 14:34

## 2023-03-09 RX ADMIN — SODIUM CHLORIDE, PRESERVATIVE FREE 10 ML: 5 INJECTION INTRAVENOUS at 10:44

## 2023-03-09 ASSESSMENT — PATIENT HEALTH QUESTIONNAIRE - PHQ9
2. FEELING DOWN, DEPRESSED OR HOPELESS: 0
1. LITTLE INTEREST OR PLEASURE IN DOING THINGS: 0
SUM OF ALL RESPONSES TO PHQ QUESTIONS 1-9: 0
SUM OF ALL RESPONSES TO PHQ QUESTIONS 1-9: 0
SUM OF ALL RESPONSES TO PHQ9 QUESTIONS 1 & 2: 0
SUM OF ALL RESPONSES TO PHQ QUESTIONS 1-9: 0
SUM OF ALL RESPONSES TO PHQ QUESTIONS 1-9: 0

## 2023-03-09 NOTE — PROGRESS NOTES
3/9/23 saw pt today with Chidi Ceja NP for hospital follow up and pre chemo c6d1 taxol/cyramza. She is feeling much better. Some abd fullness, will get KUB today after chemo. She is going to call GI to reschedule missed appt due to hospitalization. Proceed to infusion. Follow up in 1 week. Encouraged to call with any concerns. Navigation will continue to follow.

## 2023-03-09 NOTE — PROGRESS NOTES
[facility] Hematology and Oncology: Office Visit Established Patient    Reason for follow up:    Chirag Gonzalez  is seen in follow-up for carcinoma of unknown primary. Overview: (copied from prior)  Ms. Sandrita Marks was seen for the first time in our office in February 2022. She was a woman of previously good health who began noticing left-sided back discomfort in early January 2022. This was associated  ultimately with a sense of difficulty swallowing and ultimately she presented to [facility] for evaluation. Her symptoms were felt to potentially be indicative of a bowel obstruction and she was sent for a CT scan. This study, performed on January 27, 2022  was notable for a linear calcific density along the course of the proximal left ureter with associated moderate hydronephrosis potentially indicative of an intraureteral calculus. There was also stranding throughout the retroperitoneal soft tissues anterior  to the left psoas muscle with pelvic ascites. There was also wall thickening involving the descending colon. The question of colitis or serosal implants was raised. The patient had undergone a gastric sleeve placement several years prior. She had  also undergone a negative colonoscopy about 3 years prior to these presentations. There was a history of anemia ultimately resulting in the performance of a hysterectomy approximately 3 years ago for menorrhagia. Because of the CT scan findings, she  was ultimately referred to Dr. Dante Toussaint for evaluation of a possible pelvic malignancy. On February 1, 2022 he performed a laparoscopy and biopsy with evidence of peritoneal carcinomatosis grossly. A \"peritoneal nodule\" and a \"peritoneal implant\"  were both positive for a poorly differentiated metastatic carcinoma potentially consistent with an upper gastrointestinal primary. An omental biopsy showed no evidence of malignancy.   Immunohistochemistries were positive for cytokeratin 7 and negative  for cytokeratin 20. The tumor was weakly positive for CDX2. The only other positive study was MOC-31. Testing through [facility] demonstrated no mutation and ERB-B2. There was no microsatellite instability and no mismatch repair protein deficiencies. There were no targetable lesions. A PET scan was subsequently performed on  showing elevated FDG uptake in the left pelvis corresponding with a masslike density concerning  for peritoneal carcinomatosis. There was a small volume of free fluid within the peritoneal cavity. There was moderate right hydroureteronephrosis. Despite the pathologic findings, there was no evidence of FDG activity in the upper gastrointestinal  tract. CA-125 was slightly elevated at 44. Given the uncertainty of her diagnosis, the patient went back for additional surgery on February 15 at which time she underwent bilateral ureteral stent placement and bilateral salpingo-oophorectomies. Pathology  from that surgery was notable for poorly differentiated carcinoma with occasional signet ring features. Immunohistochemical stains were most consistent with a tumor of the upper gastrointestinal tract. She is  1 para 1 abortus 0. There is no  family history of cancer. She has had no difficulties with vomiting. She still notes that some food traverses her esophagus slowly. She had undergone a prior dilatation without any evidence of cancer. She subsequently began treatment with FOLFOX ultimately  with the addition of bevacizumab in 2022. She ultimately received 8 cycles of FOLFOX most of them with Avastin. On , she was taken to surgery for evaluation of debulking and HIPEC. Unfortunately, at the time of surgery her tumor was found to be tightly adherent and impossible to debulk. Despite the FOLFOX, she developed progressive ascites necessitating placement of a Pleurx catheter.   Based on this symptomatic progression, a decision was made to place her on paclitaxel and ramucirumab.  In October, she was hospitalized for recurrent nausea and vomiting.  She was placed on intravenous fluids and seen by gastroenterology.  She underwent an esophageal dilatation as part of her EGD.  Most importantly however a decision was made to place her on TPN.      1/19/23: She returns today, on her birthday, for follow-up and C4D15 Cyramza/Taxol.  Overall, she continues to tolerate treatment well.  She denies any changes since last week. There are no GI or bowel complaints.  Weight is stable, eating some by mouth, remains on TPN 12 hours daily.  Fatigue is ongoing.  There is no cough, shortness of breath, or edema.  Neuropathy is stable, no pain.  Abd pleurX drainage has decreased, she only got 50-75ml after not drainage for the past 3 weeks which is a great sign of disease response.  She denies any fevers or infectious symptoms.             1/26/2023: PROCEDURES PERFORMED:  Cystoscopy with left retrograde pyelogram, right retrograde pyelogram, exchange of right ureteral stent.      Interval history:  3/9/2023:  She is here today for HFU and next cycle of Taxol/Rami.  She was hospitalized 3/2 - 3/7 for intractable nausea and vomiting.  CT AP showed free fluid, ? Peritonitis/carcinomatosis, wall thickening of GB, pyelo, bladder wall thickening and colitis.  She was treated with CTX/Flagyl x 5 days.  Cultures were NG.  She remains on TPN.  She had fluid retention and received one dose of IV Lasix with improvement.  Her ankle swelling has improved.  She denies any abdominal pain.  Her nausea is under control.  She remains on TPN, has HH services.  She had an appointment to see GI but this was cancelled as she was in the hospital.  She notes that foods become stuck at times.  She does not have difficulty with liquids and this is mostly what she consumes.  She denies any shortness of breath.  She does note abdominal bloating which was present in the hospital, felt to be gas related.  She  did not have the bloating when CT was performed, she will check a KUB today. She denies any fevers or other infectious symptoms. Review of Systems:  14 point ROS was negative except as per HPI      ECOG PERFORMANCE STATUS - 2 - Ambulatory and capable of all selfcare but unable to carry out any work activities. Up and about more than 50% of waking hours. Pain - Pain Score:   0 - No pain/10. None/Minimal pain - not affecting QOL     Fatigue - No flowsheet data found. Distress - No flowsheet data found. Reviewed and updated this visit by provider:  Tobacco  Allergies  Meds  Problems  Med Hx  Surg Hx  Fam Hx          Current Outpatient Medications   Medication Sig Dispense Refill    vitamin D (ERGOCALCIFEROL) 1.25 MG (63878 UT) CAPS capsule Take 1 capsule by mouth every 7 days 8 capsule 0    pantoprazole (PROTONIX) 40 MG tablet Take 1 tablet by mouth in the morning and at bedtime 60 tablet 0    ondansetron (ZOFRAN-ODT) 8 MG TBDP disintegrating tablet Place 1 tablet under the tongue every 8 hours as needed for Nausea or Vomiting (Patient not taking: Reported on 3/9/2023) 90 tablet 0    prochlorperazine (COMPAZINE) 10 MG tablet Take 1 tablet by mouth every 6 hours as needed (nausea) (Patient not taking: Reported on 3/9/2023) 20 tablet 0    metoclopramide (REGLAN) 5 MG tablet Take 1 tablet by mouth 3 times daily (Patient not taking: No sig reported) 90 tablet 0    lidocaine-prilocaine (EMLA) 2.5-2.5 % cream PRN  Apply to port about 45 minutes prior to access (Patient not taking: Reported on 3/9/2023)       No current facility-administered medications for this visit.      Facility-Administered Medications Ordered in Other Visits   Medication Dose Route Frequency Provider Last Rate Last Admin    sodium chloride flush 0.9 % injection 10 mL  10 mL IntraVENous PRN Shirin Gross MD   10 mL at 03/09/23 1044        OBJECTIVE:  /82 (Site: Right Upper Arm, Position: Sitting)   Pulse 100   Temp 98.4 °F (36.9 °C)   Resp 18   Ht 5' 4\" (1.626 m)   Wt 124 lb 3.2 oz (56.3 kg)   SpO2 98%   BMI 21.32 kg/m²   Wt Readings from Last 3 Encounters:   03/09/23 124 lb 3.2 oz (56.3 kg)   03/06/23 117 lb 8 oz (53.3 kg)   03/02/23 117 lb 9.6 oz (53.3 kg)       Physical Exam:  Patient is alert and oriented x3. She is in no acute distress. Neck: Supple. There is no thyromegaly  Lungs: The lungs are clear to auscultation. There is no chest wall tenderness and no  use of accessory respiratory musculature. Heart: There is no jugular venous distention. The rate is normal and rhythm regular. The S1 and S2 are normal and there are no murmurs or rubs. Abdomen: Soft, non-tender, distended, bowel sounds present and normal. peritoneal drainage catheter in place. Skin: No rash, petechiae or ecchymoses. No evidence of malignancy. Extremities: No cyanosis, clubbing; trace lower extremity edema.      Labs:  Lab Results   Component Value Date    WBC 7.9 03/09/2023    HGB 9.0 (L) 03/09/2023    HCT 30.9 (L) 03/09/2023    MCV 88.3 03/09/2023     03/09/2023     Lab Results   Component Value Date    NEUTROABS 4.2 05/12/2022    LYMPHOPCT 15 03/09/2023    LYMPHSABS 1.2 03/09/2023    MONOPCT 17 (H) 03/09/2023    MONOSABS 1.4 (H) 03/09/2023    EOSABS 0.0 03/09/2023    BASOPCT 0 03/09/2023     Lab Results   Component Value Date     03/09/2023    K 4.3 03/09/2023     03/09/2023    CO2 30 03/09/2023    BUN 19 03/09/2023    CREATININE 0.40 (L) 03/09/2023    GLUCOSE 99 03/09/2023    CALCIUM 8.7 03/09/2023    PROT 6.2 (L) 03/09/2023    LABALBU 2.5 (L) 03/09/2023    BILITOT 0.3 03/09/2023    ALKPHOS 93 03/09/2023    AST 15 03/09/2023    ALT 15 03/09/2023    LABGLOM >60 03/09/2023    GFRAA >60 09/27/2022    AGRATIO 1.1 (L) 05/12/2022    GLOB 3.7 03/09/2023               Imaging:  CT Result (most recent):  CT ABDOMEN PELVIS W IV CONTRAST 03/02/2023    Narrative  EXAM: CT OF THE ABDOMEN AND PELVIS WITH IV CONTRAST    INDICATIONS: Nausea, vomiting, abdominal pain    TECHNIQUE: CT scan of the abdomen and pelvis was performed following the  administration of intravenous contrast. CT dose lowering techniques were used,  to include: automated exposure control, adjustment for patient size, and / or  use of iterative reconstruction.    COMPARISON: 12/21/2022    FINDINGS:  Bones: No destructive osseous lesions.    Lung bases: Unremarkable    ABDOMEN:  Liver: There is scalloping of the hepatic capsule by the adjacent fluid. The  portal venous system is patent.    Gallbladder and Bile Ducts: There is wall thickening of the gallbladder. There  is no intrahepatic or extrahepatic biliary ductal dilatation.    Spleen: A hypoattenuating structure in the posterior spleen is too small to  characterize.    Pancreas: The pancreatic parenchyma is unremarkable. There is no pancreatic  ductal dilatation.    Adrenals: Unremarkable without nodularity.    Kidneys: A right-sided double-J nephroureteral stent is present and appears  appropriately positioned. An extrarenal pelvis is present on the right. There  are some patchy regions of hypoenhancement in the right kidney. No focal lesion  is demonstrated.    Vasculature: No evidence of atherosclerosis or aneurysm.    Nodes: There is no lymphadenopathy by size criteria.    Bowel: There is no bowel obstruction. Enteric contrast reaches the splenic  flexure. Status post sleeve gastrectomy. No extraluminal contrast is seen. The  appendix is unremarkable. There is wall thickening of the colon along its  length.    Mesentery/Peritoneum: There is a small to moderate volume of abdominopelvic free  fluid. There is associated enhancement of the peritoneal surfaces. A right  lower quadrant approach drain terminates in the eyad hepatis.    Soft Tissues: Unremarkable    PELVIS:  Pelvic Organs: Status post hysterectomy. There is wall thickening of the urinary  bladder.    Impression  1. Small to moderate volume of abdominopelvic  free fluid. There is associated  enhancement of the peritoneal surfaces. This may represent peritonitis and/or  carcinomatosis. 2. Wall thickening of the gallbladder. This is highly nonspecific and may be  reactive to the fluid. If there is clinical suspicion for cystic duct  obstruction, CT would be recommended. 3. Some patchy regions of hypoenhancement in the right kidney, compatible with  acute pyelonephritis. 4. Wall thickening of the urinary bladder. Please correlate with urinalysis. 5. Wall thickening of the colon along its length. This may be related to  relative underdistention or colitis. 6. Additional chronic findings as above. Adarsh rBuno M.D.  3/2/2023 8:41:00 PM        ASSESSMENT:  Ms. Walker has an apparent metastatic carcinoma of unknown primary. Pathologically, the tumor seems to resemble a process arising in the upper gastrointestinal tract. However, any such primary is  not visible on EGD or PET scanning. Dr. René Villanueva presumption is that this may have originated from the colon based on its location and behavior. That too would be somewhat inconsistent with the pathology findings. She has been treated with FOLFOX and Avastin. There was certainly no symptomatic response and in fact she developed progressive and symptomatic ascites suggesting disease progression. She is now on TPN and receiving paclitaxel and ramucirumab. Although there have been no obvious radiographic changes, she does seem to be responding. The drainage of her ascites is much less. She is actually able to eat some food intake and nutrition bit by mouth. She continues to receive TPN. She seems a bit stronger. Protein calorie malnutrition-now on TPN     PLAN:  Proceed with C4D15 Cyramzqa/Taxol. Continue home TPN through [facility] and can continue to try oral intake as tolerated. Return in 1 week for OV/C4D15.      2/2/2023: Patient has remained clinically stable.   She plans to see gastroenterology for evaluation of heartburn. Labs and physical exam are adequate to proceed with day 1 of cycle 5 of ramucirumab + paclitaxel. We will plan to repeat imaging next month. She will continue receiving total parenteral nutrition. She will continue to be seen intermittently by clinical nutritionist.  Return office visit will be scheduled as per treatment plan. All questions were answered to the best of my abilities. Pertinent old records including NGS testing were reviewed. No targetable mutations were found. 3/2/2023: Since last visit patient's clinical status has significantly declined. She has intractable nausea, bilious vomiting and abdominal pain rated at 8 out of 10 today. I am concerned that with administration of chemotherapy her symptoms might worsen and therefor D1C6 will be held today. She will therefore be given IV fluid and intravenous antiemetics in the chair today. We shall attempt to admit her directly to the hospital for expedited abdominal imaging and supportive management. Return office visit will be scheduled according to her discharge. Patient/mother verbalized understanding of the above and are in agreement with the management plan. 3/9/2023:  She is here today for HFU and next cycle of Taxol/Rami. She was hospitalized 3/2 - 3/7 for intractable nausea and vomiting. CT AP showed free fluid, ? Peritonitis/carcinomatosis, wall thickening of GB, pyelo, bladder wall thickening and colitis. She was treated with CTX/Flagyl x 5 days. Cultures were NG. She remains on TPN. She had fluid retention and received one dose of IV Lasix with improvement. Her ankle swelling has improved. She denies any abdominal pain. Her nausea is under control. She remains on TPN, has [facility] services. She had an appointment to see GI but this was cancelled as she was in the hospital.  She notes that foods become stuck at times. She does not have difficulty with liquids and this is mostly what she consumes. She denies any shortness of breath. She does note abdominal bloating which was present in the hospital, felt to be gas related. She did not have the bloating when CT was performed, she will check a KUB today. She denies any fevers or other infectious symptoms. Labs reviewed and adequate for treatment. Awaiting urine protein result - discussed with infusion RN.                 Orlin Orozco, SCOTT - NP  [facility] Hematology and Oncology  [address] [place] [address]  Office : [phone]  Fax : [phone]

## 2023-03-09 NOTE — PROGRESS NOTES
3/9/2023  Pt to Infusion c/o generalized weakness. Labs reviewed and pt received treatment per order, tolerated well. Patient instructed to call provider with temperature of 100.4 or greater, nausea/vomiting/diarrhea/pain not controlled by medications, or any other issues/concerns. Aware of next Infusion appt on  [email or web address] . Discharged home with family.

## 2023-03-09 NOTE — PATIENT INSTRUCTIONS
Patient Instructions from Today's Visit    Reason for Visit:  Hospital follow up   Pre chemo cycle 6 day 1 taxol/cyramza     Plan:  Reschedule appointment with GI for swallowing issues  Proceed to infusion   Plain abd xray    Follow Up:  1 week     Recent Lab Results:  Hospital Outpatient Visit on 03/09/2023   Component Date Value Ref Range Status    WBC 03/09/2023 7.9  4.3 - 11.1 K/uL Final    RBC 03/09/2023 3.50 (A)  4.05 - 5.2 M/uL Final    Hemoglobin 03/09/2023 9.0 (A)  11.7 - 15.4 g/dL Final    Hematocrit 03/09/2023 30.9 (A)  35.8 - 46.3 % Final    MCV 03/09/2023 88.3  82.0 - 102.0 FL Final    MCH 03/09/2023 25.7 (A)  26.1 - 32.9 PG Final    MCHC 03/09/2023 29.1 (A)  31.4 - 35.0 g/dL Final    RDW 03/09/2023 20.0 (A)  11.9 - 14.6 % Final    Platelets 41/19/4340 310  150 - 450 K/uL Final    MPV 03/09/2023 10.4  9.4 - 12.3 FL Final    nRBC 03/09/2023 0.00  0.0 - 0.2 K/uL Final    **Note: Absolute NRBC parameter is now reported with Hemogram**    Seg Neutrophils 03/09/2023 68  43 - 78 % Final    Lymphocytes 03/09/2023 15  13 - 44 % Final    Monocytes 03/09/2023 17 (A)  4.0 - 12.0 % Final    Eosinophils % 03/09/2023 0 (A)  0.5 - 7.8 % Final    Basophils 03/09/2023 0  0.0 - 2.0 % Final    Immature Granulocytes 03/09/2023 0  0.0 - 5.0 % Final    Segs Absolute 03/09/2023 5.4  1.7 - 8.2 K/UL Final    Absolute Lymph # 03/09/2023 1.2  0.5 - 4.6 K/UL Final    Absolute Mono # 03/09/2023 1.4 (A)  0.1 - 1.3 K/UL Final    Absolute Eos # 03/09/2023 0.0  0.0 - 0.8 K/UL Final    Basophils Absolute 03/09/2023 0.0  0.0 - 0.2 K/UL Final    Absolute Immature Granulocyte 03/09/2023 0.0  0.0 - 0.5 K/UL Final    Differential Type 03/09/2023 AUTOMATED    Final    Sodium 03/09/2023 138  133 - 143 mmol/L Final    Potassium 03/09/2023 4.3  3.5 - 5.1 mmol/L Final    Chloride 03/09/2023 105  101 - 110 mmol/L Final    CO2 03/09/2023 30  21 - 32 mmol/L Final    Anion Gap 03/09/2023 3  2 - 11 mmol/L Final    Glucose 03/09/2023 99  65 - 100 mg/dL Final    BUN 03/09/2023 19  6 - 23 MG/DL Final    Creatinine 03/09/2023 0.40 (A)  0.6 - 1.0 MG/DL Final    Est, Glom Filt Rate 03/09/2023 >60  >60 ml/min/1.73m2 Final    Comment:      Pediatric calculator link: Ryan.at. org/professionals/kdoqi/gfr_calculatorped       These results are not intended for use in patients <25years of age. eGFR results are calculated without a race factor using  the 2021 CKD-EPI equation. Careful clinical correlation is recommended, particularly when comparing to results calculated using previous equations. The CKD-EPI equation is less accurate in patients with extremes of muscle mass, extra-renal metabolism of creatinine, excessive creatine ingestion, or following therapy that affects renal tubular secretion. Calcium 03/09/2023 8.7  8.3 - 10.4 MG/DL Final    Total Bilirubin 03/09/2023 0.3  0.2 - 1.1 MG/DL Final    ALT 03/09/2023 15  12 - 65 U/L Final    AST 03/09/2023 15  15 - 37 U/L Final    Alk Phosphatase 03/09/2023 93  50 - 136 U/L Final    Total Protein 03/09/2023 6.2 (A)  6.3 - 8.2 g/dL Final    Albumin 03/09/2023 2.5 (A)  3.5 - 5.0 g/dL Final    Globulin 03/09/2023 3.7  2.8 - 4.5 g/dL Final    Albumin/Globulin Ratio 03/09/2023 0.7  0.4 - 1.6   Final    Magnesium 03/09/2023 2.1  1.8 - 2.4 mg/dL Final    Phosphorus 03/09/2023 3.9  2.5 - 4.5 MG/DL Final   No results displayed because visit has over 200 results. Treatment Summary has been discussed and given to patient: no        -------------------------------------------------------------------------------------------------------------------  Please call our office at [phone] if you have any  of the following symptoms:   Fever of 100.5 or greater  Chills  Shortness of breath  Swelling or pain in one leg    After office hours an answering service is available and will contact a provider for emergencies or if you are experiencing any of the above symptoms.     Patient did express an interest in My Chart. My Chart log in information explained on the after visit summary printout at the [facility] desk.     Neil Spaulding RN, BSN  Nurse Navigator  [phone] cell  [email or web address]

## 2023-03-10 ENCOUNTER — CARE COORDINATION (OUTPATIENT)
Dept: CARE COORDINATION | Facility: CLINIC | Age: 48
End: 2023-03-10

## 2023-03-10 NOTE — CARE COORDINATION
Care Transitions Outreach Attempt    Call within 2 business days of discharge: Yes   Attempted to reach patient for transitions of care follow up. Unable to reach patient. Patient engaged with Oncology Navigator  Patient: Parth Rose Patient : 1975 MRN: [id number]    Last Discharge [address]       Date Complaint Diagnosis Description Type Department Provider    3/2/23   Admission (Discharged) Kiet Tejada MD              Was this an external facility discharge?  No Discharge Facility: [facility]    Noted following upcoming appointments from discharge chart review:   [facility] follow up appointment(s):   Future Appointments   Date Time Provider Keo Black   3/16/2023 11:30 AM  [address]   3/16/2023 12:30 PM BOB Cortes [facility]   3/16/2023 12:30 PM Lenora Huffman RD [facility] AMB   3/16/2023  1:30 PM POD[facility]C [facility]   3/23/2023  8:00 AM POD2B [facility]   3/29/2023  9:78 AM [facility] CT [address] [facility]   2023 10:30 AM LAB CK [facility]   2023 11:00 AM SCOTT Velarde NP [facility]B   [facility]   2023 11:15 AM Arely Meza MD [facility] AMB     Non[facility] follow up appointment(s): n/a

## 2023-03-12 LAB
BACTERIA SPEC CULT: ABNORMAL
BACTERIA SPEC CULT: ABNORMAL
SERVICE CMNT-IMP: ABNORMAL

## 2023-03-13 DIAGNOSIS — N30.90 CYSTITIS: Primary | ICD-10-CM

## 2023-03-13 RX ORDER — FLUCONAZOLE 100 MG/1
200 TABLET ORAL DAILY
Qty: 28 TABLET | Refills: 0 | Status: SHIPPED | OUTPATIENT
Start: 2023-03-13 | End: 2023-03-27

## 2023-03-16 ENCOUNTER — CLINICAL DOCUMENTATION (OUTPATIENT)
Dept: CASE MANAGEMENT | Age: 48
End: 2023-03-16

## 2023-03-16 ENCOUNTER — OFFICE VISIT (OUTPATIENT)
Dept: PALLATIVE CARE | Age: 48
End: 2023-03-16
Payer: COMMERCIAL

## 2023-03-16 ENCOUNTER — OFFICE VISIT (OUTPATIENT)
Dept: ONCOLOGY | Age: 48
End: 2023-03-16

## 2023-03-16 ENCOUNTER — HOSPITAL ENCOUNTER (OUTPATIENT)
Dept: INFUSION THERAPY | Age: 48
Discharge: HOME | End: 2023-03-16
Payer: COMMERCIAL

## 2023-03-16 VITALS
OXYGEN SATURATION: 99 % | TEMPERATURE: 98.8 F | DIASTOLIC BLOOD PRESSURE: 90 MMHG | WEIGHT: 119.8 LBS | SYSTOLIC BLOOD PRESSURE: 119 MMHG | HEIGHT: 64 IN | BODY MASS INDEX: 20.45 KG/M2 | HEART RATE: 112 BPM | RESPIRATION RATE: 16 BRPM

## 2023-03-16 DIAGNOSIS — R12 HEARTBURN: Primary | ICD-10-CM

## 2023-03-16 DIAGNOSIS — C80.1 CARCINOMA OF UNKNOWN PRIMARY (HCC): ICD-10-CM

## 2023-03-16 DIAGNOSIS — Z79.899 HIGH RISK MEDICATION USE: ICD-10-CM

## 2023-03-16 DIAGNOSIS — K21.9 GASTROESOPHAGEAL REFLUX DISEASE, UNSPECIFIED WHETHER ESOPHAGITIS PRESENT: ICD-10-CM

## 2023-03-16 DIAGNOSIS — C78.6 PERITONEAL CARCINOMATOSIS (HCC): ICD-10-CM

## 2023-03-16 DIAGNOSIS — G62.0 CHEMOTHERAPY-INDUCED NEUROPATHY (HCC): ICD-10-CM

## 2023-03-16 DIAGNOSIS — Z51.5 ENCOUNTER FOR PALLIATIVE CARE: ICD-10-CM

## 2023-03-16 DIAGNOSIS — C80.1 METASTASIS TO PERITONEUM OF UNKNOWN PRIMARY (HCC): ICD-10-CM

## 2023-03-16 DIAGNOSIS — C80.1 CARCINOMA OF UNKNOWN PRIMARY (HCC): Primary | ICD-10-CM

## 2023-03-16 DIAGNOSIS — T45.1X5A CHEMOTHERAPY-INDUCED NEUROPATHY (HCC): ICD-10-CM

## 2023-03-16 DIAGNOSIS — Z78.9 ON TOTAL PARENTERAL NUTRITION (TPN): ICD-10-CM

## 2023-03-16 DIAGNOSIS — C78.6 METASTASIS TO PERITONEUM OF UNKNOWN PRIMARY (HCC): ICD-10-CM

## 2023-03-16 DIAGNOSIS — C78.6 METASTASIS TO PERITONEUM OF UNKNOWN PRIMARY (HCC): Primary | ICD-10-CM

## 2023-03-16 DIAGNOSIS — Z00.8 NUTRITIONAL ASSESSMENT: Primary | ICD-10-CM

## 2023-03-16 DIAGNOSIS — R53.83 FATIGUE DUE TO TREATMENT: ICD-10-CM

## 2023-03-16 DIAGNOSIS — C80.1 METASTASIS TO PERITONEUM OF UNKNOWN PRIMARY (HCC): Primary | ICD-10-CM

## 2023-03-16 LAB
ALBUMIN SERPL-MCNC: 2.6 G/DL (ref 3.5–5)
ALBUMIN/GLOB SERPL: 0.6 (ref 0.4–1.6)
ALP SERPL-CCNC: 110 U/L (ref 50–136)
ALT SERPL-CCNC: 21 U/L (ref 12–65)
ANION GAP SERPL CALC-SCNC: 5 MMOL/L (ref 2–11)
AST SERPL-CCNC: 25 U/L (ref 15–37)
BASOPHILS # BLD: 0 K/UL (ref 0–0.2)
BASOPHILS NFR BLD: 0 % (ref 0–2)
BILIRUB SERPL-MCNC: 0.2 MG/DL (ref 0.2–1.1)
BUN SERPL-MCNC: 22 MG/DL (ref 6–23)
CALCIUM SERPL-MCNC: 9.2 MG/DL (ref 8.3–10.4)
CHLORIDE SERPL-SCNC: 105 MMOL/L (ref 101–110)
CO2 SERPL-SCNC: 28 MMOL/L (ref 21–32)
CREAT SERPL-MCNC: 0.5 MG/DL (ref 0.6–1)
DIFFERENTIAL METHOD BLD: ABNORMAL
EOSINOPHIL # BLD: 0.4 K/UL (ref 0–0.8)
EOSINOPHIL NFR BLD: 6 % (ref 0.5–7.8)
ERYTHROCYTE [DISTWIDTH] IN BLOOD BY AUTOMATED COUNT: 19.1 % (ref 11.9–14.6)
GLOBULIN SER CALC-MCNC: 4.1 G/DL (ref 2.8–4.5)
GLUCOSE SERPL-MCNC: 114 MG/DL (ref 65–100)
HCT VFR BLD AUTO: 30 % (ref 35.8–46.3)
HGB BLD-MCNC: 8.9 G/DL (ref 11.7–15.4)
IMM GRANULOCYTES # BLD AUTO: 0.1 K/UL (ref 0–0.5)
IMM GRANULOCYTES NFR BLD AUTO: 1 % (ref 0–5)
LYMPHOCYTES # BLD: 1.2 K/UL (ref 0.5–4.6)
LYMPHOCYTES NFR BLD: 20 % (ref 13–44)
MAGNESIUM SERPL-MCNC: 2 MG/DL (ref 1.8–2.4)
MCH RBC QN AUTO: 26 PG (ref 26.1–32.9)
MCHC RBC AUTO-ENTMCNC: 29.7 G/DL (ref 31.4–35)
MCV RBC AUTO: 87.7 FL (ref 82–102)
MONOCYTES # BLD: 0.2 K/UL (ref 0.1–1.3)
MONOCYTES NFR BLD: 4 % (ref 4–12)
NEUTS SEG # BLD: 4.1 K/UL (ref 1.7–8.2)
NEUTS SEG NFR BLD: 69 % (ref 43–78)
NRBC # BLD: 0 K/UL (ref 0–0.2)
PLATELET # BLD AUTO: 341 K/UL (ref 150–450)
PMV BLD AUTO: 10.2 FL (ref 9.4–12.3)
POTASSIUM SERPL-SCNC: 4.4 MMOL/L (ref 3.5–5.1)
PROT SERPL-MCNC: 6.7 G/DL (ref 6.3–8.2)
RBC # BLD AUTO: 3.42 M/UL (ref 4.05–5.2)
SODIUM SERPL-SCNC: 138 MMOL/L (ref 133–143)
WBC # BLD AUTO: 6 K/UL (ref 4.3–11.1)

## 2023-03-16 PROCEDURE — 36591 DRAW BLOOD OFF VENOUS DEVICE: CPT

## 2023-03-16 PROCEDURE — 85025 COMPLETE CBC W/AUTO DIFF WBC: CPT

## 2023-03-16 PROCEDURE — 96375 TX/PRO/DX INJ NEW DRUG ADDON: CPT

## 2023-03-16 PROCEDURE — 96413 CHEMO IV INFUSION 1 HR: CPT

## 2023-03-16 PROCEDURE — 83735 ASSAY OF MAGNESIUM: CPT

## 2023-03-16 PROCEDURE — 99214 OFFICE O/P EST MOD 30 MIN: CPT | Performed by: NURSE PRACTITIONER

## 2023-03-16 PROCEDURE — 6360000002 HC RX W HCPCS: Performed by: NURSE PRACTITIONER

## 2023-03-16 PROCEDURE — 2580000003 HC RX 258: Performed by: NURSE PRACTITIONER

## 2023-03-16 PROCEDURE — 80053 COMPREHEN METABOLIC PANEL: CPT

## 2023-03-16 PROCEDURE — 2580000003 HC RX 258: Performed by: INTERNAL MEDICINE

## 2023-03-16 PROCEDURE — A4216 STERILE WATER/SALINE, 10 ML: HCPCS | Performed by: NURSE PRACTITIONER

## 2023-03-16 PROCEDURE — 2500000003 HC RX 250 WO HCPCS: Performed by: NURSE PRACTITIONER

## 2023-03-16 RX ORDER — FAMOTIDINE 10 MG/ML
20 INJECTION, SOLUTION INTRAVENOUS ONCE
Status: CANCELLED | OUTPATIENT
Start: 2023-03-16 | End: 2023-03-16

## 2023-03-16 RX ORDER — FAMOTIDINE 10 MG/ML
20 INJECTION, SOLUTION INTRAVENOUS
Status: CANCELLED | OUTPATIENT
Start: 2023-03-16

## 2023-03-16 RX ORDER — SODIUM CHLORIDE 9 MG/ML
INJECTION, SOLUTION INTRAVENOUS CONTINUOUS
OUTPATIENT
Start: 2023-03-16

## 2023-03-16 RX ORDER — HEPARIN SODIUM (PORCINE) LOCK FLUSH IV SOLN 100 UNIT/ML 100 UNIT/ML
500 SOLUTION INTRAVENOUS PRN
OUTPATIENT
Start: 2023-03-16

## 2023-03-16 RX ORDER — SODIUM CHLORIDE 0.9 % (FLUSH) 0.9 %
5-40 SYRINGE (ML) INJECTION PRN
Status: CANCELLED | OUTPATIENT
Start: 2023-03-16

## 2023-03-16 RX ORDER — DEXAMETHASONE SODIUM PHOSPHATE 10 MG/ML
10 INJECTION INTRAMUSCULAR; INTRAVENOUS ONCE
Status: COMPLETED | OUTPATIENT
Start: 2023-03-16 | End: 2023-03-16

## 2023-03-16 RX ORDER — SODIUM CHLORIDE 9 MG/ML
5-250 INJECTION, SOLUTION INTRAVENOUS PRN
Status: DISCONTINUED | OUTPATIENT
Start: 2023-03-16 | End: 2023-03-17 | Stop reason: HOSPADM

## 2023-03-16 RX ORDER — ACETAMINOPHEN 325 MG/1
650 TABLET ORAL
OUTPATIENT
Start: 2023-03-16

## 2023-03-16 RX ORDER — MEPERIDINE HYDROCHLORIDE 50 MG/ML
12.5 INJECTION INTRAMUSCULAR; INTRAVENOUS; SUBCUTANEOUS PRN
OUTPATIENT
Start: 2023-03-16

## 2023-03-16 RX ORDER — SODIUM CHLORIDE 0.9 % (FLUSH) 0.9 %
10 SYRINGE (ML) INJECTION PRN
Status: DISCONTINUED | OUTPATIENT
Start: 2023-03-16 | End: 2023-03-17 | Stop reason: HOSPADM

## 2023-03-16 RX ORDER — ONDANSETRON 2 MG/ML
8 INJECTION INTRAMUSCULAR; INTRAVENOUS
OUTPATIENT
Start: 2023-03-16

## 2023-03-16 RX ORDER — DIPHENHYDRAMINE HYDROCHLORIDE 50 MG/ML
50 INJECTION INTRAMUSCULAR; INTRAVENOUS ONCE
Status: CANCELLED | OUTPATIENT
Start: 2023-03-16 | End: 2023-03-16

## 2023-03-16 RX ORDER — DIPHENHYDRAMINE HYDROCHLORIDE 50 MG/ML
50 INJECTION INTRAMUSCULAR; INTRAVENOUS
Status: CANCELLED | OUTPATIENT
Start: 2023-03-16

## 2023-03-16 RX ORDER — SODIUM CHLORIDE 0.9 % (FLUSH) 0.9 %
5-40 SYRINGE (ML) INJECTION PRN
Status: DISCONTINUED | OUTPATIENT
Start: 2023-03-16 | End: 2023-03-17 | Stop reason: HOSPADM

## 2023-03-16 RX ORDER — SODIUM CHLORIDE 9 MG/ML
5-250 INJECTION, SOLUTION INTRAVENOUS PRN
Status: CANCELLED | OUTPATIENT
Start: 2023-03-16

## 2023-03-16 RX ORDER — ALBUTEROL SULFATE 90 UG/1
4 AEROSOL, METERED RESPIRATORY (INHALATION) PRN
OUTPATIENT
Start: 2023-03-16

## 2023-03-16 RX ORDER — SODIUM CHLORIDE 9 MG/ML
5-250 INJECTION, SOLUTION INTRAVENOUS PRN
OUTPATIENT
Start: 2023-03-16

## 2023-03-16 RX ORDER — EPINEPHRINE 1 MG/ML
0.3 INJECTION, SOLUTION, CONCENTRATE INTRAVENOUS PRN
OUTPATIENT
Start: 2023-03-16

## 2023-03-16 RX ORDER — ONDANSETRON 2 MG/ML
8 INJECTION INTRAMUSCULAR; INTRAVENOUS ONCE
Status: COMPLETED | OUTPATIENT
Start: 2023-03-16 | End: 2023-03-16

## 2023-03-16 RX ORDER — SODIUM CHLORIDE 9 MG/ML
5-40 INJECTION INTRAVENOUS PRN
OUTPATIENT
Start: 2023-03-16

## 2023-03-16 RX ORDER — ONDANSETRON 2 MG/ML
8 INJECTION INTRAMUSCULAR; INTRAVENOUS ONCE
Status: CANCELLED | OUTPATIENT
Start: 2023-03-16 | End: 2023-03-16

## 2023-03-16 RX ORDER — DIPHENHYDRAMINE HYDROCHLORIDE 50 MG/ML
50 INJECTION INTRAMUSCULAR; INTRAVENOUS
Status: DISCONTINUED | OUTPATIENT
Start: 2023-03-16 | End: 2023-03-17 | Stop reason: HOSPADM

## 2023-03-16 RX ORDER — DIPHENHYDRAMINE HYDROCHLORIDE 50 MG/ML
50 INJECTION INTRAMUSCULAR; INTRAVENOUS ONCE
Status: COMPLETED | OUTPATIENT
Start: 2023-03-16 | End: 2023-03-16

## 2023-03-16 RX ADMIN — SODIUM CHLORIDE, PRESERVATIVE FREE 10 ML: 5 INJECTION INTRAVENOUS at 14:00

## 2023-03-16 RX ADMIN — PACLITAXEL 126 MG: 6 INJECTION, SOLUTION, CONCENTRATE INTRAVENOUS at 15:05

## 2023-03-16 RX ADMIN — ONDANSETRON 8 MG: 2 INJECTION INTRAMUSCULAR; INTRAVENOUS at 14:17

## 2023-03-16 RX ADMIN — SODIUM CHLORIDE, PRESERVATIVE FREE 10 ML: 5 INJECTION INTRAVENOUS at 11:43

## 2023-03-16 RX ADMIN — DEXAMETHASONE SODIUM PHOSPHATE 10 MG: 10 INJECTION INTRAMUSCULAR; INTRAVENOUS at 14:18

## 2023-03-16 RX ADMIN — SODIUM CHLORIDE, PRESERVATIVE FREE 10 ML: 5 INJECTION INTRAVENOUS at 16:10

## 2023-03-16 RX ADMIN — FAMOTIDINE 20 MG: 10 INJECTION, SOLUTION INTRAVENOUS at 14:15

## 2023-03-16 RX ADMIN — SODIUM CHLORIDE 100 ML/HR: 9 INJECTION, SOLUTION INTRAVENOUS at 14:00

## 2023-03-16 RX ADMIN — DIPHENHYDRAMINE HYDROCHLORIDE 50 MG: 50 INJECTION, SOLUTION INTRAMUSCULAR; INTRAVENOUS at 14:13

## 2023-03-16 ASSESSMENT — PATIENT HEALTH QUESTIONNAIRE - PHQ9
2. FEELING DOWN, DEPRESSED OR HOPELESS: 0
SUM OF ALL RESPONSES TO PHQ QUESTIONS 1-9: 0
SUM OF ALL RESPONSES TO PHQ QUESTIONS 1-9: 0
1. LITTLE INTEREST OR PLEASURE IN DOING THINGS: 0
SUM OF ALL RESPONSES TO PHQ9 QUESTIONS 1 & 2: 0
SUM OF ALL RESPONSES TO PHQ QUESTIONS 1-9: 0
SUM OF ALL RESPONSES TO PHQ QUESTIONS 1-9: 0

## 2023-03-16 ASSESSMENT — ENCOUNTER SYMPTOMS
ABDOMINAL PAIN: 0
NAUSEA: 0

## 2023-03-16 NOTE — PROGRESS NOTES
Outpatient Palliative Care at the  [facility]: Office Visit Established Patient    Diagnosis: metastatic carcinoma of unknown primary    Treatment Plan: FOLFOX + Avastin--> Taxol/Cyramza    Treatment Intent: Palliative    Medical Oncologist: Dr. Dat Swenson Oncologist: N/A    Navigator: Anita Aly RN      Chief Complaint:    Chief Complaint   Patient presents with    Follow-up       History of Present Illness:  Ms. Maurice Marti is a 50 y.o. female who presents today for evaluation regarding introduction to palliative care. Patient initially presented with back pain, difficulty swallowing in January 2022. A CT on 1/27/22 showed soft tissue stranding throughout retroperitoneum, wall thickening of colon, and ascites. On 2/1/22, Dr. Eduardo Coyle performed laparoscopy and biopsy revealing peritoneal carcinomatosis; biopsy showed poorly differentiated metastatic carcinoma potentially consistent with upper GI primary. PET negative for activity in upper GI tract. She had bilateral ureteral stent placement and bilateral salpingo-oophorectomies on 2/15/22- biopsy from that surgery showed poorly differentiated carcinoma with occasional signet ring features. She is followed by Dr. Tracy Sexton and began FOLFOX in April 2022. Late October, she was hospitalized for dysphagia, intractable vomiting due to peritoneal carcinomatosis. She was started on TPN. She subsequently began Taxol/Cyramza. Patient lives with her boyfriend and 31VD daughter, Rowena Rodríguez. She lives 2 miles from her mom. Her dad is not involved in her life and she nor her mom know how to contact her dad. Interval History:  Patient last seen by palliative care in December 2022. Her last cycle of Cyramza/Taxol was 2/16/23. More recently, she was hospitalized from 3/2-3/7 for intractable pain, nausea/vomiting. CT showed carcinomatosis, pyelonephritis, colitis, bladder wall thickening. She continues to drain abdominal PleurX as needed.   She continues TPN (now at 12 hrs/day), but eats by mouth as tolerated. Her main obstacle to eating is appetite and heartburn. She has just recently started adding Pepcid to her TPN bags as directed. She hopes that she can one day eat \"like she used to\". She denies nausea and does not utilize Zofran. GI follow-up pending. She denies pain. She drains her abdominal PleurX weekly at most.         Review of Systems:  Review of Systems   Constitutional:  Positive for appetite change. Gastrointestinal:  Negative for abdominal pain and nausea. Heartburn   All other systems reviewed and are negative.       No Known Allergies  Past Medical History:   Diagnosis Date    Alteration in nutrition     per RD note TPN dependent 12 hours per day- Intramed Plus    Anemia     Colon cancer ([address] dx 2022    followed by dr Kiki EL-19 2020    no hospitalization    GERD (gastroesophageal reflux disease)     managed with med    History of blood transfusion     History of colonoscopy 2018    Dr. Venice Hand, nl (see media note), R     Hx of blood clots 2022    per pt \"small clot on lung identified by CT scan\"  CT Scan impression:- Nonobstructive pulmonary filling defect involving Left Lower Lobe     Hypotension     asymptomatic    Obstruction of fallopian tube     per pt has \"1 good tube\"    Peritoneal carcinomatosis ([address]     chemo; abdominal pleurx    Weight loss     80lbs weight loss after gastric sleeve     Past Surgical History:   Procedure Laterality Date     SECTION      CYSTOSCOPY Bilateral 2022    CYSTOSCOPY BILATERAL RETROGRADE PYELOGRAM performed by Chacha Spaulding MD at [address] Bilateral 2022    BILATERAL CYSTOSCOPY URETERAL STENT EXCHANGE performed by Chacha Spaulding MD at [address] Bilateral 10/6/2022    CYSTOSCOPY BILATERAL URETERAL STENT REMOVAL, RIGHT URETERAL STENT PLACEMENT performed by Chacha Spaulding MD at [facility] MAIN OR CYSTOSCOPY Bilateral 1/26/2023    CYSTOSCOPY / BILATERAL RETROGRADE / RIGHT URETERAL STENT EXCHANGE performed by Anibal Felix MD at [facility] MAIN OR    GASTRIC BYPASS SURGERY  07/23/2014    gastric sleeve- [place]    HYSTERECTOMY (CERVIX STATUS UNKNOWN)      2018    HYSTERECTOMY (CERVIX STATUS UNKNOWN)  07/09/2020    [facility] w/ Bilateral salpingectomy and left oophorectomy    IR PERC CATH PLEURAL DRAIN W/IMAG  9/26/2022    IR PERC CATH PLEURAL DRAIN W/IMAG 9/26/2022 [facility] RADIOLOGY SPECIALS    IR PORT PLACEMENT EQUAL OR GREATER THAN 5 YEARS  2/28/2022    IR PORT PLACEMENT EQUAL OR GREATER THAN 5 YEARS  2/28/2022    IR PORT PLACEMENT EQUAL OR GREATER THAN 5 YEARS 2/28/2022 [facility] RADIOLOGY SPECIALS    LAPAROTOMY N/A 8/17/2022    EXPLORATORY LAPAROTOMY/ ENTEROENTEROSTOMY performed by Brigitte Webb MD at [facility] MAIN OR    MYOMECTOMY  age \"early 20s\"    also \"unblocked her FT\"    TONSILLECTOMY      UPPER GASTROINTESTINAL ENDOSCOPY      with dilation    UPPER GASTROINTESTINAL ENDOSCOPY N/A 9/13/2022    EGD ESOPHAGOGASTRODUODENOSCOPY OFL 17 To IR after recovery for Para performed by Trell Bobo MD at [facility] ENDOSCOPY    UPPER GASTROINTESTINAL ENDOSCOPY N/A 10/21/2022    EGD DILATION BALLOON performed by Trell Plummer MD at [facility] ENDOSCOPY    UROLOGICAL SURGERY Bilateral 03/15/2022    cysto     Family History   Problem Relation Age of Onset    Heart Disease Maternal Grandmother     Hypertension Maternal Grandmother     Heart Disease Maternal Grandfather     Hypertension Maternal Grandfather     Pulmonary Embolism Neg Hx     Colon Cancer Neg Hx     Deep Vein Thrombosis Neg Hx     Ovarian Cancer Neg Hx     Prostate Cancer Neg Hx     Breast Cancer Neg Hx      Social History     Socioeconomic History    Marital status: Single     Spouse name: Not on file    Number of children: Not on file    Years of education: Not on file    Highest education level: Not on file   Occupational History    Not on file   Tobacco Use    Smoking  status: Never    Smokeless tobacco: Never   Vaping Use    Vaping Use: Never used   Substance and Sexual Activity    Alcohol use: Not Currently    Drug use: No    Sexual activity: Not on file   Other Topics Concern    Not on file   Social History Narrative    1. Use large speculum. 2.  sister, Diane De Los Santos #[id number] and mom is Cain Kelly #[id number] (both Kofoed's pts)  3. PCP  Dr. Pema Harvey ([facility]), GI Dr. Brown Dress normal EGD     Social Determinants of Health     Financial Resource Strain: Not on file   Food Insecurity: Not on file   Transportation Needs: Not on file   Physical Activity: Not on file   Stress: Not on file   Social Connections: Not on file   Intimate Partner Violence: Not on file   Housing Stability: Not on file     Current Outpatient Medications   Medication Sig Dispense Refill    fluconazole (DIFLUCAN) 100 MG tablet Take 2 tablets by mouth daily for 14 days (Patient not taking: Reported on 3/16/2023) 28 tablet 0    ondansetron (ZOFRAN-ODT) 8 MG TBDP disintegrating tablet Place 1 tablet under the tongue every 8 hours as needed for Nausea or Vomiting (Patient not taking: No sig reported) 90 tablet 0    prochlorperazine (COMPAZINE) 10 MG tablet Take 1 tablet by mouth every 6 hours as needed (nausea) (Patient not taking: No sig reported) 20 tablet 0    vitamin D (ERGOCALCIFEROL) 1.25 MG (81287 UT) CAPS capsule Take 1 capsule by mouth every 7 days 8 capsule 0    pantoprazole (PROTONIX) 40 MG tablet Take 1 tablet by mouth in the morning and at bedtime 60 tablet 0    metoclopramide (REGLAN) 5 MG tablet Take 1 tablet by mouth 3 times daily (Patient not taking: No sig reported) 90 tablet 0    lidocaine-prilocaine (EMLA) 2.5-2.5 % cream PRN  Apply to port about 45 minutes prior to access (Patient not taking: No sig reported)       No current facility-administered medications for this visit.      Facility-Administered Medications Ordered in Other Visits   Medication Dose Route Frequency Provider Last Rate Last Admin    sodium chloride flush 0.9 % injection 10 mL  10 mL IntraVENous PRN Romero Morel MD   10 mL at 03/16/23 1143    0.9 % sodium chloride infusion  5-250 mL/hr IntraVENous PRN BOB Salinas        PACLitaxel (TAXOL) 120 mg in sodium chloride 0.9 % 250 mL chemo infusion  80 mg/m2 (Treatment Plan Recorded) IntraVENous Once Gretchen Camarena NP-COURTNEY        sodium chloride flush 0.9 % injection 5-40 mL  5-40 mL IntraVENous PRN Gretchen Camarena, NP-C        hydrocortisone sodium succinate PF (SOLU-CORTEF) injection 100 mg  100 mg IntraVENous Once PRN Gretchen Kohlineghoward Camarena, NP-C        famotidine (PEPCID) 20 mg in sodium chloride (PF) 0.9 % 10 mL injection  20 mg IntraVENous Once PRN Gretchen Camarena, NP-C        diphenhydrAMINE (BENADRYL) injection 50 mg  50 mg IntraVENous Once PRN Gretchen Camarena, NP-C           OBJECTIVE:  Wt Readings from Last 1 Encounters:   03/16/23 119 lb 12.8 oz (54.3 kg)     Temp Readings from Last 1 Encounters:   03/16/23 98.8 °F (37.1 °C) (Oral)     BP Readings from Last 1 Encounters:   03/16/23 (!) 119/90     Pulse Readings from Last 1 Encounters:   03/16/23 (!) 112        Pain Score: Zero (fatigue 7)    Physical Exam:  Constitutional: Well developed, well nourished thin female in no acute distress.   HEENT: Normocephalic and atraumatic.  Extraocular muscles are intact.  Sclerae anicteric. Neck supple without JVD.   Lymph node   deferred   Skin Warm and dry.  No bruising and no rash noted.  No erythema.  No pallor.    Respiratory Unlabored respiratory effort.    CVS Normotensive, normal rate   Abdomen Soft, nontender and nondistended. PleurX catheter present.   Neuro Grossly nonfocal with no obvious sensory or motor deficits.   MSK Normal range of motion in general.  No edema and no tenderness.   Psych Appropriate mood and affect.        Labs:  Recent Results (from the past 24 hour(s))   CBC with Auto Differential     Collection Time: 03/16/23 11:36 AM   Result Value Ref Range    WBC 6.0 4.3 - 11.1 K/uL    RBC 3.42 (L) 4.05 - 5.2 M/uL    Hemoglobin 8.9 (L) 11.7 - 15.4 g/dL    Hematocrit 30.0 (L) 35.8 - 46.3 %    MCV 87.7 82.0 - 102.0 FL    MCH 26.0 (L) 26.1 - 32.9 PG    MCHC 29.7 (L) 31.4 - 35.0 g/dL    RDW 19.1 (H) 11.9 - 14.6 %    Platelets 774 408 - 000 K/uL    MPV 10.2 9.4 - 12.3 FL    nRBC 0.00 0.0 - 0.2 K/uL    Seg Neutrophils 69 43 - 78 %    Lymphocytes 20 13 - 44 %    Monocytes 4 4.0 - 12.0 %    Eosinophils % 6 0.5 - 7.8 %    Basophils 0 0.0 - 2.0 %    Immature Granulocytes 1 0.0 - 5.0 %    Segs Absolute 4.1 1.7 - 8.2 K/UL    Absolute Lymph # 1.2 0.5 - 4.6 K/UL    Absolute Mono # 0.2 0.1 - 1.3 K/UL    Absolute Eos # 0.4 0.0 - 0.8 K/UL    Basophils Absolute 0.0 0.0 - 0.2 K/UL    Absolute Immature Granulocyte 0.1 0.0 - 0.5 K/UL    Differential Type AUTOMATED     Comprehensive Metabolic Panel    Collection Time: 03/16/23 11:36 AM   Result Value Ref Range    Sodium 138 133 - 143 mmol/L    Potassium 4.4 3.5 - 5.1 mmol/L    Chloride 105 101 - 110 mmol/L    CO2 28 21 - 32 mmol/L    Anion Gap 5 2 - 11 mmol/L    Glucose 114 (H) 65 - 100 mg/dL    BUN 22 6 - 23 MG/DL    Creatinine 0.50 (L) 0.6 - 1.0 MG/DL    Est, Glom Filt Rate >60 >60 ml/min/1.73m2    Calcium 9.2 8.3 - 10.4 MG/DL    Total Bilirubin 0.2 0.2 - 1.1 MG/DL    ALT 21 12 - 65 U/L    AST 25 15 - 37 U/L    Alk Phosphatase 110 50 - 136 U/L    Total Protein 6.7 6.3 - 8.2 g/dL    Albumin 2.6 (L) 3.5 - 5.0 g/dL    Globulin 4.1 2.8 - 4.5 g/dL    Albumin/Globulin Ratio 0.6 0.4 - 1.6     Magnesium    Collection Time: 03/16/23 11:36 AM   Result Value Ref Range    Magnesium 2.0 1.8 - 2.4 mg/dL       Imaging:  No results found for this or any previous visit. ASSESSMENT:   Diagnosis Orders   1. Heartburn        2. Carcinoma of unknown primary ([facility] [address]        3. Encounter for palliative care              PLAN:  Lab studies and imaging studies were personally reviewed.     Pertinent old records were reviewed from previous [facility] encounters       Heartburn: encouraged ongoing use of Pepcid in TPN. May try OTC Mylanta, Gaviscon. She reports taking Carafate in the hospital and it did not help- upon further review, last Carfate order was inpatient in October 2022. Can revisit if needed. Impaired appetite: Agreed to revisit after GI evaluation complete and she is cleared for diet. Abdominal pain: Denies  Nausea: Denies    Discussion held about the varying emotions that patient experiences, and the appropriateness of them all. Encouraged patient to accept these emotions, and talk to loved ones or professionals as needed. Advanced Care Planning Discussed: None today. Have previously discussed availability of HCPOA here, as well as [facility] Mom introduction. Will follow up in: 4-6 weeks or earlier if needed    I have reviewed the patient's controlled substance prescription history, as maintained in the [place] prescription monitoring program, so that the prescriptions(s) for a controlled substance can be given.   Last Date Reviewed: 3/16/23          SCOTT Kimble CNP  Outpatient Palliative Care at the  [address]  Office : [phone]  Fax : [phone]

## 2023-03-16 NOTE — PROGRESS NOTES
3/16/23 saw pt today with Danielle Acuña NP for pre chemo c6 day 8 taxol/cyramza. She is tolerating chemo well. Still waiting for evaluation with GI. Continues on TPN. Proceed to infusion. Follow up in 1 week infusion only and 3 weeks for cycle 7. Encouraged to call with any concerns. Navigation will continue to follow.
other

## 2023-03-16 NOTE — PROGRESS NOTES
[address] [place] [address] Hematology and Oncology: Office Visit Established Patient    Reason for follow up:    Romero Schaeffer  is seen in follow-up for carcinoma of unknown primary. Overview: (copied from prior)  Ms. Leonid Vu was seen for the first time in our office in February 2022. She was a woman of previously good health who began noticing left-sided back discomfort in early January 2022. This was associated  ultimately with a sense of difficulty swallowing and ultimately she presented to [facility] for evaluation. Her symptoms were felt to potentially be indicative of a bowel obstruction and she was sent for a CT scan. This study, performed on January 27, 2022  was notable for a linear calcific density along the course of the proximal left ureter with associated moderate hydronephrosis potentially indicative of an intraureteral calculus. There was also stranding throughout the retroperitoneal soft tissues anterior  to the left psoas muscle with pelvic ascites. There was also wall thickening involving the descending colon. The question of colitis or serosal implants was raised. The patient had undergone a gastric sleeve placement several years prior. She had  also undergone a negative colonoscopy about 3 years prior to these presentations. There was a history of anemia ultimately resulting in the performance of a hysterectomy approximately 3 years ago for menorrhagia. Because of the CT scan findings, she  was ultimately referred to Dr. Abena Sam for evaluation of a possible pelvic malignancy. On February 1, 2022 he performed a laparoscopy and biopsy with evidence of peritoneal carcinomatosis grossly. A \"peritoneal nodule\" and a \"peritoneal implant\"  were both positive for a poorly differentiated metastatic carcinoma potentially consistent with an upper gastrointestinal primary. An omental biopsy showed no evidence of malignancy.   Immunohistochemistries were positive for cytokeratin 7 and negative  for cytokeratin 20. The tumor was weakly positive for CDX2. The only other positive study was MOC-31. Testing through [employer] demonstrated no mutation and ERB-B2. There was no microsatellite instability and no mismatch repair protein deficiencies. There were no targetable lesions. A PET scan was subsequently performed on  showing elevated FDG uptake in the left pelvis corresponding with a masslike density concerning  for peritoneal carcinomatosis. There was a small volume of free fluid within the peritoneal cavity. There was moderate right hydroureteronephrosis. Despite the pathologic findings, there was no evidence of FDG activity in the upper gastrointestinal  tract. CA-125 was slightly elevated at 44. Given the uncertainty of her diagnosis, the patient went back for additional surgery on February 15 at which time she underwent bilateral ureteral stent placement and bilateral salpingo-oophorectomies. Pathology  from that surgery was notable for poorly differentiated carcinoma with occasional signet ring features. Immunohistochemical stains were most consistent with a tumor of the upper gastrointestinal tract. She is  1 para 1 abortus 0. There is no  family history of cancer. She has had no difficulties with vomiting. She still notes that some food traverses her esophagus slowly. She had undergone a prior dilatation without any evidence of cancer. She subsequently began treatment with FOLFOX ultimately  with the addition of bevacizumab in 2022. She ultimately received 8 cycles of FOLFOX most of them with Avastin. On , she was taken to surgery for evaluation of debulking and HIPEC. Unfortunately, at the time of surgery her tumor was found to be tightly adherent and impossible to debulk. Despite the FOLFOX, she developed progressive ascites necessitating placement of a Pleurx catheter.   Based on this symptomatic progression, a decision was made to place her on paclitaxel and ramucirumab. In October, she was hospitalized for recurrent nausea and vomiting. She was placed on intravenous fluids and seen by gastroenterology. She underwent an esophageal dilatation as part of her EGD. Most importantly however a decision was made to place her on TPN.      1/19/23: She returns today, on her birthday, for follow-up and C4D15 Cyramza/Taxol. Overall, she continues to tolerate treatment well. She denies any changes since last week. There are no GI or bowel complaints. Weight is stable, eating some by mouth, remains on TPN 12 hours daily. Fatigue is ongoing. There is no cough, shortness of breath, or edema. Neuropathy is stable, no pain. Abd pleurX drainage has decreased, she only got 50-75ml after not drainage for the past 3 weeks which is a great sign of disease response. She denies any fevers or infectious symptoms. 1/26/2023: PROCEDURES PERFORMED:  Cystoscopy with left retrograde pyelogram, right retrograde pyelogram, exchange of right ureteral stent. Interval history:  3/17/23:  She is here today for HFU and next cycle of Taxol/Rami - cycle 6, day 8. She has been well overall. Tolerated treatment without issue. Nausea was coming from GERD - restarted famotidine with her TPN and feeling better. No taking anything by mouth essentially. No bowel troubles. She denies any abdominal pain. She still has a little distention but not bothersome. She denies any shortness of breath or cough. She denies any fevers or other infectious symptoms. There has been no bleeding. Neuropathy stable. Review of Systems:  14 point ROS was negative except as per HPI      ECOG PERFORMANCE STATUS - 2 - Ambulatory and capable of all selfcare but unable to carry out any work activities. Up and about more than 50% of waking hours. Pain - Pain Score:   0 - No pain (fatigue 7)/10. None/Minimal pain - not affecting QOL     Fatigue - No flowsheet data found.   Distress - No flowsheet data found. Reviewed and updated this visit by provider:  Tobacco  Allergies  Meds  Problems  Med Hx  Surg Hx  Fam Hx          Current Outpatient Medications   Medication Sig Dispense Refill    vitamin D (ERGOCALCIFEROL) 1.25 MG (59360 UT) CAPS capsule Take 1 capsule by mouth every 7 days 8 capsule 0    pantoprazole (PROTONIX) 40 MG tablet Take 1 tablet by mouth in the morning and at bedtime 60 tablet 0    fluconazole (DIFLUCAN) 100 MG tablet Take 2 tablets by mouth daily for 14 days (Patient not taking: Reported on 3/16/2023) 28 tablet 0    ondansetron (ZOFRAN-ODT) 8 MG TBDP disintegrating tablet Place 1 tablet under the tongue every 8 hours as needed for Nausea or Vomiting (Patient not taking: No sig reported) 90 tablet 0    prochlorperazine (COMPAZINE) 10 MG tablet Take 1 tablet by mouth every 6 hours as needed (nausea) (Patient not taking: No sig reported) 20 tablet 0    metoclopramide (REGLAN) 5 MG tablet Take 1 tablet by mouth 3 times daily (Patient not taking: No sig reported) 90 tablet 0    lidocaine-prilocaine (EMLA) 2.5-2.5 % cream PRN  Apply to port about 45 minutes prior to access (Patient not taking: No sig reported)       No current facility-administered medications for this visit. Facility-Administered Medications Ordered in Other Visits   Medication Dose Route Frequency Provider Last Rate Last Admin    sodium chloride flush 0.9 % injection 10 mL  10 mL IntraVENous PRN Leigh Ann Adams MD   10 mL at 03/16/23 1143        OBJECTIVE:  BP (!) 119/90 Comment: standing  Pulse (!) 112   Temp 98.8 °F (37.1 °C) (Oral)   Resp 16   Ht 5' 4\" (1.626 m)   Wt 119 lb 12.8 oz (54.3 kg)   SpO2 99%   BMI 20.56 kg/m²   Wt Readings from Last 3 Encounters:   03/16/23 119 lb 12.8 oz (54.3 kg)   03/09/23 124 lb 3.2 oz (56.3 kg)   03/06/23 117 lb 8 oz (53.3 kg)       Physical Exam:  Patient is alert and oriented x3. She is in no acute distress. Neck: Supple.  There is no thyromegaly  Lungs: The lungs are clear to auscultation. There is no chest wall tenderness and no  use of accessory respiratory musculature. Heart: There is no jugular venous distention. The rate is normal and rhythm regular. The S1 and S2 are normal and there are no murmurs or rubs. Abdomen: Soft, non-tender, distended, bowel sounds present and normal. peritoneal drainage catheter in place. Skin: No rash, petechiae or ecchymoses. No evidence of malignancy. Extremities: No cyanosis, clubbing; trace lower extremity edema. Labs:  Lab Results   Component Value Date    WBC 6.0 03/16/2023    HGB 8.9 (L) 03/16/2023    HCT 30.0 (L) 03/16/2023    MCV 87.7 03/16/2023     03/16/2023     Lab Results   Component Value Date    NEUTROABS 4.2 05/12/2022    LYMPHOPCT 20 03/16/2023    LYMPHSABS 1.2 03/16/2023    MONOPCT 4 03/16/2023    MONOSABS 0.2 03/16/2023    EOSABS 0.4 03/16/2023    BASOPCT 0 03/16/2023     Lab Results   Component Value Date     03/16/2023    K 4.4 03/16/2023     03/16/2023    CO2 28 03/16/2023    BUN 22 03/16/2023    CREATININE 0.50 (L) 03/16/2023    GLUCOSE 114 (H) 03/16/2023    CALCIUM 9.2 03/16/2023    PROT 6.7 03/16/2023    LABALBU 2.6 (L) 03/16/2023    BILITOT 0.2 03/16/2023    ALKPHOS 110 03/16/2023    AST 25 03/16/2023    ALT 21 03/16/2023    LABGLOM >60 03/16/2023    GFRAA >60 09/27/2022    AGRATIO 1.1 (L) 05/12/2022    GLOB 4.1 03/16/2023               Imaging:  CT Result (most recent):  CT ABDOMEN PELVIS W IV CONTRAST 03/02/2023    Narrative  EXAM: CT OF THE ABDOMEN AND PELVIS WITH IV CONTRAST    INDICATIONS: Nausea, vomiting, abdominal pain    TECHNIQUE: CT scan of the abdomen and pelvis was performed following the  administration of intravenous contrast. CT dose lowering techniques were used,  to include: automated exposure control, adjustment for patient size, and / or  use of iterative reconstruction. COMPARISON: 12/21/2022    FINDINGS:  Bones: No destructive osseous lesions.     Lung bases: Unremarkable    ABDOMEN:  Liver: There is scalloping of the hepatic capsule by the adjacent fluid. The  portal venous system is patent. Gallbladder and Bile Ducts: There is wall thickening of the gallbladder. There  is no intrahepatic or extrahepatic biliary ductal dilatation. Spleen: A hypoattenuating structure in the posterior spleen is too small to  characterize. Pancreas: The pancreatic parenchyma is unremarkable. There is no pancreatic  ductal dilatation. Adrenals: Unremarkable without nodularity. Kidneys: A right-sided double-J nephroureteral stent is present and appears  appropriately positioned. An extrarenal pelvis is present on the right. There  are some patchy regions of hypoenhancement in the right kidney. No focal lesion  is demonstrated. Vasculature: No evidence of atherosclerosis or aneurysm. Nodes: There is no lymphadenopathy by size criteria. Bowel: There is no bowel obstruction. Enteric contrast reaches the splenic  flexure. Status post sleeve gastrectomy. No extraluminal contrast is seen. The  appendix is unremarkable. There is wall thickening of the colon along its  length. Mesentery/Peritoneum: There is a small to moderate volume of abdominopelvic free  fluid. There is associated enhancement of the peritoneal surfaces. A right  lower quadrant approach drain terminates in the eyad hepatis. Soft Tissues: Unremarkable    PELVIS:  Pelvic Organs: Status post hysterectomy. There is wall thickening of the urinary  bladder. Impression  1. Small to moderate volume of abdominopelvic free fluid. There is associated  enhancement of the peritoneal surfaces. This may represent peritonitis and/or  carcinomatosis. 2. Wall thickening of the gallbladder. This is highly nonspecific and may be  reactive to the fluid. If there is clinical suspicion for cystic duct  obstruction, CT would be recommended.   3. Some patchy regions of hypoenhancement in the right kidney, compatible with  acute pyelonephritis. 4. Wall thickening of the urinary bladder. Please correlate with urinalysis. 5. Wall thickening of the colon along its length. This may be related to  relative underdistention or colitis. 6. Additional chronic findings as above. Stephen Figueredo M.D.  3/2/2023 8:41:00 PM        ASSESSMENT:  Ms. Adilia Workman has an apparent metastatic carcinoma of unknown primary. Pathologically, the tumor seems to resemble a process arising in the upper gastrointestinal tract. However, any such primary is  not visible on EGD or PET scanning. Dr. Young Jon presumption is that this may have originated from the colon based on its location and behavior. That too would be somewhat inconsistent with the pathology findings. She has been treated with FOLFOX and Avastin. There was certainly no symptomatic response and in fact she developed progressive and symptomatic ascites suggesting disease progression. She is now on TPN and receiving paclitaxel and ramucirumab. Although there have been no obvious radiographic changes, she does seem to be responding. The drainage of her ascites is much less. She is actually able to eat some food intake and nutrition bit by mouth. She continues to receive TPN. She seems a bit stronger. Protein calorie malnutrition-now on TPN     PLAN:  She is here today for HFU and next cycle of Taxol/Rami - cycle 6, day 8. She has been well overall. Tolerated treatment without issue. Nausea was coming from GERD - restarted famotidine with her TPN and feeling better. Not taking anything by mouth essentially. No bowel troubles. She denies any abdominal pain. She still has a little distention but not bothersome. She denies any shortness of breath or cough. She denies any fevers or other infectious symptoms. There has been no bleeding. Neuropathy stable. Labs reviewed and okay to proceed with cycle 6, day 8. Follow up in one week as scheduled or sooner if needed.  Call with any fevers, uncontrolled side effects from treatment or any other worrisome/concerning symptoms.            BOB Lynch  3 [facility] Hematology and Oncology  [address] [place] [address]  Office : [phone]  Fax : [phone]

## 2023-03-16 NOTE — PROGRESS NOTES
Arrived to the [facility]. Assessment completed, labs reviewed. Taxol completed. Patient tolerated well. Any issues or concerns during appointment: No.  Patient aware of next infusion appointment on 3/23/23 @0800. Patient instructed to call provider with temperature of 100.4 or greater or nausea/vomiting/ diarrhea or pain not controlled by medications  Discharged ambulatory.

## 2023-03-16 NOTE — PATIENT INSTRUCTIONS
Patient Instructions from Today's Visit    Reason for Visit:  Pre chemo cycle 6 day 8 taxol/cyramza     Plan:  Proceed to infusion     Follow Up:  1 week    Recent Lab Results:  Hospital Outpatient Visit on 03/16/2023   Component Date Value Ref Range Status    WBC 03/16/2023 6.0  4.3 - 11.1 K/uL Final    RBC 03/16/2023 3.42 (A)  4.05 - 5.2 M/uL Final    Hemoglobin 03/16/2023 8.9 (A)  11.7 - 15.4 g/dL Final    Hematocrit 03/16/2023 30.0 (A)  35.8 - 46.3 % Final    MCV 03/16/2023 87.7  82.0 - 102.0 FL Final    MCH 03/16/2023 26.0 (A)  26.1 - 32.9 PG Final    MCHC 03/16/2023 29.7 (A)  31.4 - 35.0 g/dL Final    RDW 03/16/2023 19.1 (A)  11.9 - 14.6 % Final    Platelets 40/31/9657 341  150 - 450 K/uL Final    MPV 03/16/2023 10.2  9.4 - 12.3 FL Final    nRBC 03/16/2023 0.00  0.0 - 0.2 K/uL Final    **Note: Absolute NRBC parameter is now reported with Hemogram**    Seg Neutrophils 03/16/2023 69  43 - 78 % Final    Lymphocytes 03/16/2023 20  13 - 44 % Final    Monocytes 03/16/2023 4  4.0 - 12.0 % Final    Eosinophils % 03/16/2023 6  0.5 - 7.8 % Final    Basophils 03/16/2023 0  0.0 - 2.0 % Final    Immature Granulocytes 03/16/2023 1  0.0 - 5.0 % Final    Segs Absolute 03/16/2023 4.1  1.7 - 8.2 K/UL Final    Absolute Lymph # 03/16/2023 1.2  0.5 - 4.6 K/UL Final    Absolute Mono # 03/16/2023 0.2  0.1 - 1.3 K/UL Final    Absolute Eos # 03/16/2023 0.4  0.0 - 0.8 K/UL Final    Basophils Absolute 03/16/2023 0.0  0.0 - 0.2 K/UL Final    Absolute Immature Granulocyte 03/16/2023 0.1  0.0 - 0.5 K/UL Final    Differential Type 03/16/2023 AUTOMATED    Final    Sodium 03/16/2023 138  133 - 143 mmol/L Final    Potassium 03/16/2023 4.4  3.5 - 5.1 mmol/L Final    Chloride 03/16/2023 105  101 - 110 mmol/L Final    CO2 03/16/2023 28  21 - 32 mmol/L Final    Anion Gap 03/16/2023 5  2 - 11 mmol/L Final    Glucose 03/16/2023 114 (A)  65 - 100 mg/dL Final    BUN 03/16/2023 22  6 - 23 MG/DL Final    Creatinine 03/16/2023 0.50 (A)  0.6 - 1.0 MG/DL Final    Est, Glom Filt Rate 03/16/2023 >60  >60 ml/min/1.73m2 Final    Comment:      Pediatric calculator link: https://www.kidney.org/professionals/kdoqi/gfr_calculatorped       These results are not intended for use in patients <18 years of age.       eGFR results are calculated without a race factor using  the 2021 CKD-EPI equation. Careful clinical correlation is recommended, particularly when comparing to results calculated using previous equations.  The CKD-EPI equation is less accurate in patients with extremes of muscle mass, extra-renal metabolism of creatinine, excessive creatine ingestion, or following therapy that affects renal tubular secretion.      Calcium 03/16/2023 9.2  8.3 - 10.4 MG/DL Final    Total Bilirubin 03/16/2023 0.2  0.2 - 1.1 MG/DL Final    ALT 03/16/2023 21  12 - 65 U/L Final    AST 03/16/2023 25  15 - 37 U/L Final    Alk Phosphatase 03/16/2023 110  50 - 136 U/L Final    Total Protein 03/16/2023 6.7  6.3 - 8.2 g/dL Final    Albumin 03/16/2023 2.6 (A)  3.5 - 5.0 g/dL Final    Globulin 03/16/2023 4.1  2.8 - 4.5 g/dL Final    Albumin/Globulin Ratio 03/16/2023 0.6  0.4 - 1.6   Final    Magnesium 03/16/2023 2.0  1.8 - 2.4 mg/dL Final         Treatment Summary has been discussed and given to patient: no        -------------------------------------------------------------------------------------------------------------------  Please call our office at [phone] if you have any  of the following symptoms:   Fever of 100.5 or greater  Chills  Shortness of breath  Swelling or pain in one leg    After office hours an answering service is available and will contact a provider for emergencies or if you are experiencing any of the above symptoms.    Patient did express an interest in My Chart.  My Chart log in information explained on the after visit summary printout at the check-out desk.    Lori Salas RN, BSN  Nurse Navigator  [phone] cell  [email or web address]

## 2023-03-20 NOTE — PROGRESS NOTES
Nutrition F/U:  Assessment:  Pt seen during office visit w/ NP, receiving Taxol today. Pt remains TPN dependent for 100% of estimated nutrition needs, taking sips of thin liquids po - has not attempted to advance diet since prior to hospital admission, worried about vomiting and c/o reflux - just started adding Famotidine to TPN daily (had not been adding daily as prescribed), TPN managed by [facility] and infusing 12 hrs/day. Pt continues to empty abdominal pleurx drain every 2-3 weeks, last drained during hospital admission on (3/2). Pt was scheduled to have GI consult but was admitted, will call to schedule appointment. Nutrition related lab values WNL. Current BW: 119#, up 2# over the past 2 weeks. Intervention:  1. Continue w/ liquid diet   2. Continue w/ TPN per [facility] - encouraged to add Famotidine daily as prescribed. 3. Encouraged to contact [facility] for follow up    Monitoring/Evaluation:  1. RD to follow up during next office visit - follow up wt status, tolerance/intake of po diet, symptom management, nutrition related lab values w/ TPN.        [address] [facility], [address], [address] [facility]

## 2023-03-22 DIAGNOSIS — C78.6 METASTASIS TO PERITONEUM OF UNKNOWN PRIMARY (HCC): ICD-10-CM

## 2023-03-22 DIAGNOSIS — C80.1 CARCINOMA OF UNKNOWN PRIMARY (HCC): Primary | ICD-10-CM

## 2023-03-22 DIAGNOSIS — C80.1 METASTASIS TO PERITONEUM OF UNKNOWN PRIMARY (HCC): ICD-10-CM

## 2023-03-23 ENCOUNTER — HOSPITAL ENCOUNTER (OUTPATIENT)
Dept: INFUSION THERAPY | Age: 48
Discharge: HOME | End: 2023-03-23
Payer: COMMERCIAL

## 2023-03-23 VITALS
OXYGEN SATURATION: 98 % | RESPIRATION RATE: 16 BRPM | SYSTOLIC BLOOD PRESSURE: 114 MMHG | WEIGHT: 115 LBS | DIASTOLIC BLOOD PRESSURE: 84 MMHG | HEART RATE: 104 BPM | TEMPERATURE: 98.2 F | BODY MASS INDEX: 19.74 KG/M2

## 2023-03-23 DIAGNOSIS — Z79.899 HIGH RISK MEDICATION USE: ICD-10-CM

## 2023-03-23 DIAGNOSIS — C78.6 METASTASIS TO PERITONEUM OF UNKNOWN PRIMARY (HCC): ICD-10-CM

## 2023-03-23 DIAGNOSIS — C80.1 METASTASIS TO PERITONEUM OF UNKNOWN PRIMARY (HCC): ICD-10-CM

## 2023-03-23 DIAGNOSIS — C80.1 CARCINOMA OF UNKNOWN PRIMARY (HCC): Primary | ICD-10-CM

## 2023-03-23 LAB
ALBUMIN SERPL-MCNC: 2.8 G/DL (ref 3.5–5)
ALBUMIN/GLOB SERPL: 0.7 (ref 0.4–1.6)
ALP SERPL-CCNC: 116 U/L (ref 50–136)
ALT SERPL-CCNC: 15 U/L (ref 12–65)
ANION GAP SERPL CALC-SCNC: 6 MMOL/L (ref 2–11)
AST SERPL-CCNC: 15 U/L (ref 15–37)
BASOPHILS # BLD: 0 K/UL (ref 0–0.2)
BASOPHILS NFR BLD: 1 % (ref 0–2)
BILIRUB SERPL-MCNC: 0.2 MG/DL (ref 0.2–1.1)
BUN SERPL-MCNC: 27 MG/DL (ref 6–23)
CALCIUM SERPL-MCNC: 8.4 MG/DL (ref 8.3–10.4)
CHLORIDE SERPL-SCNC: 101 MMOL/L (ref 101–110)
CO2 SERPL-SCNC: 28 MMOL/L (ref 21–32)
CREAT SERPL-MCNC: 0.6 MG/DL (ref 0.6–1)
DIFFERENTIAL METHOD BLD: ABNORMAL
EOSINOPHIL # BLD: 0.1 K/UL (ref 0–0.8)
EOSINOPHIL NFR BLD: 1 % (ref 0.5–7.8)
ERYTHROCYTE [DISTWIDTH] IN BLOOD BY AUTOMATED COUNT: 18.8 % (ref 11.9–14.6)
GLOBULIN SER CALC-MCNC: 4.2 G/DL (ref 2.8–4.5)
GLUCOSE SERPL-MCNC: 139 MG/DL (ref 65–100)
HCT VFR BLD AUTO: 30.5 % (ref 35.8–46.3)
HGB BLD-MCNC: 9.1 G/DL (ref 11.7–15.4)
IMM GRANULOCYTES # BLD AUTO: 0 K/UL (ref 0–0.5)
IMM GRANULOCYTES NFR BLD AUTO: 1 % (ref 0–5)
LYMPHOCYTES # BLD: 1 K/UL (ref 0.5–4.6)
LYMPHOCYTES NFR BLD: 29 % (ref 13–44)
MCH RBC QN AUTO: 26 PG (ref 26.1–32.9)
MCHC RBC AUTO-ENTMCNC: 29.8 G/DL (ref 31.4–35)
MCV RBC AUTO: 87.1 FL (ref 82–102)
MONOCYTES # BLD: 0.2 K/UL (ref 0.1–1.3)
MONOCYTES NFR BLD: 7 % (ref 4–12)
NEUTS SEG # BLD: 2.1 K/UL (ref 1.7–8.2)
NEUTS SEG NFR BLD: 62 % (ref 43–78)
NRBC # BLD: 0 K/UL (ref 0–0.2)
PLATELET # BLD AUTO: 383 K/UL (ref 150–450)
PMV BLD AUTO: 11.1 FL (ref 9.4–12.3)
POTASSIUM SERPL-SCNC: 4 MMOL/L (ref 3.5–5.1)
PROT SERPL-MCNC: 7 G/DL (ref 6.3–8.2)
PROT UR-MCNC: 98 MG/DL
RBC # BLD AUTO: 3.5 M/UL (ref 4.05–5.2)
SODIUM SERPL-SCNC: 135 MMOL/L (ref 133–143)
WBC # BLD AUTO: 3.5 K/UL (ref 4.3–11.1)

## 2023-03-23 PROCEDURE — 6360000002 HC RX W HCPCS: Performed by: NURSE PRACTITIONER

## 2023-03-23 PROCEDURE — 80053 COMPREHEN METABOLIC PANEL: CPT

## 2023-03-23 PROCEDURE — 84156 ASSAY OF PROTEIN URINE: CPT

## 2023-03-23 PROCEDURE — 2580000003 HC RX 258: Performed by: NURSE PRACTITIONER

## 2023-03-23 PROCEDURE — A4216 STERILE WATER/SALINE, 10 ML: HCPCS | Performed by: NURSE PRACTITIONER

## 2023-03-23 PROCEDURE — 2500000003 HC RX 250 WO HCPCS: Performed by: NURSE PRACTITIONER

## 2023-03-23 PROCEDURE — 96375 TX/PRO/DX INJ NEW DRUG ADDON: CPT

## 2023-03-23 PROCEDURE — 85025 COMPLETE CBC W/AUTO DIFF WBC: CPT

## 2023-03-23 PROCEDURE — 96413 CHEMO IV INFUSION 1 HR: CPT

## 2023-03-23 PROCEDURE — 96417 CHEMO IV INFUS EACH ADDL SEQ: CPT

## 2023-03-23 RX ORDER — DIPHENHYDRAMINE HYDROCHLORIDE 50 MG/ML
50 INJECTION INTRAMUSCULAR; INTRAVENOUS ONCE
Status: COMPLETED | OUTPATIENT
Start: 2023-03-23 | End: 2023-03-23

## 2023-03-23 RX ORDER — SODIUM CHLORIDE 9 MG/ML
5-250 INJECTION, SOLUTION INTRAVENOUS PRN
Status: DISCONTINUED | OUTPATIENT
Start: 2023-03-23 | End: 2023-03-24 | Stop reason: HOSPADM

## 2023-03-23 RX ORDER — EPINEPHRINE 1 MG/ML
0.3 INJECTION, SOLUTION, CONCENTRATE INTRAVENOUS PRN
Status: DISCONTINUED | OUTPATIENT
Start: 2023-03-23 | End: 2023-03-24 | Stop reason: HOSPADM

## 2023-03-23 RX ORDER — ACETAMINOPHEN 325 MG/1
650 TABLET ORAL
Status: DISCONTINUED | OUTPATIENT
Start: 2023-03-23 | End: 2023-03-24 | Stop reason: HOSPADM

## 2023-03-23 RX ORDER — SODIUM CHLORIDE 9 MG/ML
5-250 INJECTION, SOLUTION INTRAVENOUS PRN
Status: CANCELLED | OUTPATIENT
Start: 2023-03-23

## 2023-03-23 RX ORDER — ALBUTEROL SULFATE 90 UG/1
4 AEROSOL, METERED RESPIRATORY (INHALATION) PRN
Status: DISCONTINUED | OUTPATIENT
Start: 2023-03-23 | End: 2023-03-24 | Stop reason: HOSPADM

## 2023-03-23 RX ORDER — SODIUM CHLORIDE 9 MG/ML
5-40 INJECTION INTRAVENOUS PRN
Status: CANCELLED | OUTPATIENT
Start: 2023-03-23

## 2023-03-23 RX ORDER — SODIUM CHLORIDE 0.9 % (FLUSH) 0.9 %
5-40 SYRINGE (ML) INJECTION PRN
Status: DISCONTINUED | OUTPATIENT
Start: 2023-03-23 | End: 2023-03-24 | Stop reason: HOSPADM

## 2023-03-23 RX ORDER — ONDANSETRON 2 MG/ML
8 INJECTION INTRAMUSCULAR; INTRAVENOUS
Status: DISCONTINUED | OUTPATIENT
Start: 2023-03-23 | End: 2023-03-24 | Stop reason: HOSPADM

## 2023-03-23 RX ORDER — HEPARIN SODIUM (PORCINE) LOCK FLUSH IV SOLN 100 UNIT/ML 100 UNIT/ML
500 SOLUTION INTRAVENOUS PRN
Status: CANCELLED | OUTPATIENT
Start: 2023-03-23

## 2023-03-23 RX ORDER — SODIUM CHLORIDE 9 MG/ML
INJECTION, SOLUTION INTRAVENOUS CONTINUOUS
Status: CANCELLED | OUTPATIENT
Start: 2023-03-23

## 2023-03-23 RX ORDER — DEXAMETHASONE SODIUM PHOSPHATE 10 MG/ML
10 INJECTION INTRAMUSCULAR; INTRAVENOUS ONCE
Status: COMPLETED | OUTPATIENT
Start: 2023-03-23 | End: 2023-03-23

## 2023-03-23 RX ORDER — MEPERIDINE HYDROCHLORIDE 25 MG/ML
12.5 INJECTION INTRAMUSCULAR; INTRAVENOUS; SUBCUTANEOUS PRN
Status: DISCONTINUED | OUTPATIENT
Start: 2023-03-23 | End: 2023-03-24 | Stop reason: HOSPADM

## 2023-03-23 RX ORDER — ONDANSETRON 2 MG/ML
8 INJECTION INTRAMUSCULAR; INTRAVENOUS ONCE
Status: COMPLETED | OUTPATIENT
Start: 2023-03-23 | End: 2023-03-23

## 2023-03-23 RX ORDER — DIPHENHYDRAMINE HYDROCHLORIDE 50 MG/ML
50 INJECTION INTRAMUSCULAR; INTRAVENOUS
Status: DISCONTINUED | OUTPATIENT
Start: 2023-03-23 | End: 2023-03-24 | Stop reason: HOSPADM

## 2023-03-23 RX ADMIN — DIPHENHYDRAMINE HYDROCHLORIDE 50 MG: 50 INJECTION, SOLUTION INTRAMUSCULAR; INTRAVENOUS at 09:10

## 2023-03-23 RX ADMIN — DEXAMETHASONE SODIUM PHOSPHATE 10 MG: 10 INJECTION INTRAMUSCULAR; INTRAVENOUS at 10:48

## 2023-03-23 RX ADMIN — ONDANSETRON 8 MG: 2 INJECTION INTRAMUSCULAR; INTRAVENOUS at 10:51

## 2023-03-23 RX ADMIN — PACLITAXEL 126 MG: 6 INJECTION, SOLUTION, CONCENTRATE INTRAVENOUS at 11:21

## 2023-03-23 RX ADMIN — SODIUM CHLORIDE 100 ML/HR: 9 INJECTION, SOLUTION INTRAVENOUS at 09:07

## 2023-03-23 RX ADMIN — SODIUM CHLORIDE 400 MG: 9 INJECTION, SOLUTION INTRAVENOUS at 09:31

## 2023-03-23 RX ADMIN — FAMOTIDINE 20 MG: 10 INJECTION, SOLUTION INTRAVENOUS at 10:46

## 2023-03-23 RX ADMIN — SODIUM CHLORIDE, PRESERVATIVE FREE 10 ML: 5 INJECTION INTRAVENOUS at 09:07

## 2023-03-23 NOTE — PROGRESS NOTES
Arrived to the [facility]. Labs, Cyramza and Taxol completed. Patient tolerated well. Any issues or concerns during appointment: no.  Patient aware of next infusion appointment on 4/6/23 (date) at 36 (time). Patient aware of next lab and [facility] office visit on 4/6/23 (date) at 21 [phone] (time). Patient instructed to call provider with temperature of 100.4 or greater or nausea/vomiting/ diarrhea or pain not controlled by medications  Discharged via wheelchair.

## 2023-03-27 DIAGNOSIS — C78.6 PERITONEAL CARCINOMATOSIS (HCC): Primary | ICD-10-CM

## 2023-03-29 ENCOUNTER — HOSPITAL ENCOUNTER (OUTPATIENT)
Dept: INTERVENTIONAL RADIOLOGY/VASCULAR | Age: 48
Discharge: HOME | End: 2023-04-01
Payer: COMMERCIAL

## 2023-03-29 ENCOUNTER — HOSPITAL ENCOUNTER (OUTPATIENT)
Dept: CT IMAGING | Age: 48
Discharge: HOME | End: 2023-04-01
Payer: COMMERCIAL

## 2023-03-29 VITALS
OXYGEN SATURATION: 99 % | DIASTOLIC BLOOD PRESSURE: 86 MMHG | RESPIRATION RATE: 16 BRPM | SYSTOLIC BLOOD PRESSURE: 151 MMHG

## 2023-03-29 VITALS
OXYGEN SATURATION: 97 % | SYSTOLIC BLOOD PRESSURE: 161 MMHG | DIASTOLIC BLOOD PRESSURE: 86 MMHG | HEART RATE: 100 BPM | BODY MASS INDEX: 19.74 KG/M2 | TEMPERATURE: 98.1 F | RESPIRATION RATE: 16 BRPM | WEIGHT: 115 LBS

## 2023-03-29 DIAGNOSIS — C78.6 PERITONEAL CARCINOMATOSIS (HCC): ICD-10-CM

## 2023-03-29 DIAGNOSIS — C80.1 CARCINOMA OF UNKNOWN PRIMARY (HCC): ICD-10-CM

## 2023-03-29 DIAGNOSIS — C78.6 METASTASIS TO PERITONEUM OF UNKNOWN PRIMARY (HCC): ICD-10-CM

## 2023-03-29 DIAGNOSIS — C80.1 METASTASIS TO PERITONEUM OF UNKNOWN PRIMARY (HCC): ICD-10-CM

## 2023-03-29 PROCEDURE — 6360000002 HC RX W HCPCS: Performed by: PHYSICIAN ASSISTANT

## 2023-03-29 PROCEDURE — 2580000003 HC RX 258: Performed by: PHYSICIAN ASSISTANT

## 2023-03-29 PROCEDURE — 71250 CT THORAX DX C-: CPT

## 2023-03-29 PROCEDURE — 49083 ABD PARACENTESIS W/IMAGING: CPT

## 2023-03-29 RX ORDER — WATER 1000 ML/1000ML
INJECTION, SOLUTION INTRAVENOUS
Status: COMPLETED | OUTPATIENT
Start: 2023-03-29 | End: 2023-03-29

## 2023-03-29 RX ADMIN — WATER 20 ML: 1 INJECTION INTRAMUSCULAR; INTRAVENOUS; SUBCUTANEOUS at 12:33

## 2023-03-29 RX ADMIN — ALTEPLASE 2 MG: 2.2 INJECTION, POWDER, LYOPHILIZED, FOR SOLUTION INTRAVENOUS at 12:33

## 2023-03-29 NOTE — DISCHARGE INSTRUCTIONS
any questions about your procedure, please call the Interventional Radiology department at [phone]. After business hours (5pm) and weekends, call the answering service at [phone] and ask for the Radiologist on call to be paged.

## 2023-03-29 NOTE — OR NURSING
Interventional Radiology Post Paracentesis/Thoracentesis Note    3/29/2023    Procedure(s): Ultrasound guided Therapeutic Paracentesis Performed with 8 Indonesian catheter total volume 600 ml. Preliminary Findings: small red/wicho and thin. Complications: None    Plan:  Observation, check labs if drawn. Chest X-Ray:  no    Full dictated report to follow      Catheter removed intact. Pressure applied and hemostasis obtained. Skin glue applied to site. Skin cleaned around peritex drain. Cathflo instilled into Peritex drain. Suture removed. Dressing applied.

## 2023-03-29 NOTE — OR NURSING
Pt and mother understood d/c instructions. All questions answered. Pt from department via wheelchair.

## 2023-04-06 ENCOUNTER — CLINICAL DOCUMENTATION (OUTPATIENT)
Dept: CASE MANAGEMENT | Age: 48
End: 2023-04-06

## 2023-04-06 ENCOUNTER — HOSPITAL ENCOUNTER (OUTPATIENT)
Dept: INFUSION THERAPY | Age: 48
Discharge: HOME | End: 2023-04-06
Payer: COMMERCIAL

## 2023-04-06 ENCOUNTER — OFFICE VISIT (OUTPATIENT)
Dept: ONCOLOGY | Age: 48
End: 2023-04-06
Payer: COMMERCIAL

## 2023-04-06 VITALS
DIASTOLIC BLOOD PRESSURE: 83 MMHG | BODY MASS INDEX: 20.78 KG/M2 | SYSTOLIC BLOOD PRESSURE: 117 MMHG | TEMPERATURE: 99 F | HEIGHT: 64 IN | HEART RATE: 77 BPM | RESPIRATION RATE: 18 BRPM | WEIGHT: 121.7 LBS | OXYGEN SATURATION: 100 %

## 2023-04-06 DIAGNOSIS — C80.1 CARCINOMA OF UNKNOWN PRIMARY (HCC): Primary | ICD-10-CM

## 2023-04-06 DIAGNOSIS — C78.6 METASTASIS TO PERITONEUM OF UNKNOWN PRIMARY (HCC): ICD-10-CM

## 2023-04-06 DIAGNOSIS — C78.6 METASTASIS TO PERITONEUM OF UNKNOWN PRIMARY (HCC): Primary | ICD-10-CM

## 2023-04-06 DIAGNOSIS — Z79.899 HIGH RISK MEDICATION USE: ICD-10-CM

## 2023-04-06 DIAGNOSIS — C80.1 CARCINOMA OF UNKNOWN PRIMARY (HCC): ICD-10-CM

## 2023-04-06 DIAGNOSIS — C80.1 METASTASIS TO PERITONEUM OF UNKNOWN PRIMARY (HCC): ICD-10-CM

## 2023-04-06 DIAGNOSIS — C80.1 METASTASIS TO PERITONEUM OF UNKNOWN PRIMARY (HCC): Primary | ICD-10-CM

## 2023-04-06 LAB
ALBUMIN SERPL-MCNC: 2.4 G/DL (ref 3.5–5)
ALBUMIN/GLOB SERPL: 0.6 (ref 0.4–1.6)
ALP SERPL-CCNC: 114 U/L (ref 50–136)
ALT SERPL-CCNC: 26 U/L (ref 12–65)
ANION GAP SERPL CALC-SCNC: 4 MMOL/L (ref 2–11)
APPEARANCE UR: ABNORMAL
AST SERPL-CCNC: 34 U/L (ref 15–37)
BACTERIA URNS QL MICRO: ABNORMAL /HPF
BASOPHILS # BLD: 0 K/UL (ref 0–0.2)
BASOPHILS NFR BLD: 0 % (ref 0–2)
BILIRUB SERPL-MCNC: 0.2 MG/DL (ref 0.2–1.1)
BILIRUB UR QL: NEGATIVE
BUN SERPL-MCNC: 25 MG/DL (ref 6–23)
CALCIUM SERPL-MCNC: 8.9 MG/DL (ref 8.3–10.4)
CASTS URNS QL MICRO: 0 /LPF
CEA SERPL-MCNC: 2.2 NG/ML (ref 0–3)
CHLORIDE SERPL-SCNC: 105 MMOL/L (ref 101–110)
CO2 SERPL-SCNC: 29 MMOL/L (ref 21–32)
COLOR UR: YELLOW
CREAT SERPL-MCNC: 0.4 MG/DL (ref 0.6–1)
CRYSTALS URNS QL MICRO: 0 /LPF
DIFFERENTIAL METHOD BLD: ABNORMAL
EOSINOPHIL # BLD: 0.1 K/UL (ref 0–0.8)
EOSINOPHIL NFR BLD: 1 % (ref 0.5–7.8)
EPI CELLS #/AREA URNS HPF: ABNORMAL /HPF
ERYTHROCYTE [DISTWIDTH] IN BLOOD BY AUTOMATED COUNT: 20.6 % (ref 11.9–14.6)
GLOBULIN SER CALC-MCNC: 4.1 G/DL (ref 2.8–4.5)
GLUCOSE SERPL-MCNC: 99 MG/DL (ref 65–100)
GLUCOSE UR STRIP.AUTO-MCNC: NEGATIVE MG/DL
HCT VFR BLD AUTO: 30.2 % (ref 35.8–46.3)
HGB BLD-MCNC: 8.7 G/DL (ref 11.7–15.4)
HGB UR QL STRIP: ABNORMAL
IMM GRANULOCYTES # BLD AUTO: 0 K/UL (ref 0–0.5)
IMM GRANULOCYTES NFR BLD AUTO: 0 % (ref 0–5)
KETONES UR QL STRIP.AUTO: NEGATIVE MG/DL
LEUKOCYTE ESTERASE UR QL STRIP.AUTO: ABNORMAL
LYMPHOCYTES # BLD: 1.5 K/UL (ref 0.5–4.6)
LYMPHOCYTES NFR BLD: 24 % (ref 13–44)
MAGNESIUM SERPL-MCNC: 2.1 MG/DL (ref 1.8–2.4)
MCH RBC QN AUTO: 25.5 PG (ref 26.1–32.9)
MCHC RBC AUTO-ENTMCNC: 28.8 G/DL (ref 31.4–35)
MCV RBC AUTO: 88.6 FL (ref 82–102)
MONOCYTES # BLD: 0.8 K/UL (ref 0.1–1.3)
MONOCYTES NFR BLD: 13 % (ref 4–12)
MUCOUS THREADS URNS QL MICRO: 0 /LPF
NEUTS SEG # BLD: 3.9 K/UL (ref 1.7–8.2)
NEUTS SEG NFR BLD: 62 % (ref 43–78)
NITRITE UR QL STRIP.AUTO: NEGATIVE
NRBC # BLD: 0 K/UL (ref 0–0.2)
OTHER OBSERVATIONS: ABNORMAL
PH UR STRIP: 7 (ref 5–9)
PLATELET # BLD AUTO: 420 K/UL (ref 150–450)
PMV BLD AUTO: 10.1 FL (ref 9.4–12.3)
POTASSIUM SERPL-SCNC: 4.4 MMOL/L (ref 3.5–5.1)
PROT SERPL-MCNC: 6.5 G/DL (ref 6.3–8.2)
PROT UR STRIP-MCNC: 30 MG/DL
PROT UR-MCNC: 46 MG/DL
RBC # BLD AUTO: 3.41 M/UL (ref 4.05–5.2)
RBC #/AREA URNS HPF: ABNORMAL /HPF
SODIUM SERPL-SCNC: 138 MMOL/L (ref 133–143)
SP GR UR REFRACTOMETRY: 1.02 (ref 1–1.02)
UROBILINOGEN UR QL STRIP.AUTO: 0.2 EU/DL
WBC # BLD AUTO: 6.3 K/UL (ref 4.3–11.1)
WBC URNS QL MICRO: ABNORMAL /HPF

## 2023-04-06 PROCEDURE — 80053 COMPREHEN METABOLIC PANEL: CPT

## 2023-04-06 PROCEDURE — 82378 CARCINOEMBRYONIC ANTIGEN: CPT

## 2023-04-06 PROCEDURE — 96375 TX/PRO/DX INJ NEW DRUG ADDON: CPT

## 2023-04-06 PROCEDURE — 99214 OFFICE O/P EST MOD 30 MIN: CPT | Performed by: NURSE PRACTITIONER

## 2023-04-06 PROCEDURE — 6360000002 HC RX W HCPCS: Performed by: NURSE PRACTITIONER

## 2023-04-06 PROCEDURE — 96413 CHEMO IV INFUSION 1 HR: CPT

## 2023-04-06 PROCEDURE — 81001 URINALYSIS AUTO W/SCOPE: CPT

## 2023-04-06 PROCEDURE — 83735 ASSAY OF MAGNESIUM: CPT

## 2023-04-06 PROCEDURE — 36591 DRAW BLOOD OFF VENOUS DEVICE: CPT

## 2023-04-06 PROCEDURE — 2580000003 HC RX 258: Performed by: NURSE PRACTITIONER

## 2023-04-06 PROCEDURE — 84156 ASSAY OF PROTEIN URINE: CPT

## 2023-04-06 PROCEDURE — 2500000003 HC RX 250 WO HCPCS: Performed by: NURSE PRACTITIONER

## 2023-04-06 PROCEDURE — 96417 CHEMO IV INFUS EACH ADDL SEQ: CPT

## 2023-04-06 PROCEDURE — 2580000003 HC RX 258: Performed by: INTERNAL MEDICINE

## 2023-04-06 PROCEDURE — 85025 COMPLETE CBC W/AUTO DIFF WBC: CPT

## 2023-04-06 PROCEDURE — A4216 STERILE WATER/SALINE, 10 ML: HCPCS | Performed by: NURSE PRACTITIONER

## 2023-04-06 RX ORDER — FAMOTIDINE 10 MG/ML
20 INJECTION, SOLUTION INTRAVENOUS ONCE
Status: CANCELLED | OUTPATIENT
Start: 2023-04-06 | End: 2023-04-06

## 2023-04-06 RX ORDER — SODIUM CHLORIDE 9 MG/ML
5-250 INJECTION, SOLUTION INTRAVENOUS PRN
OUTPATIENT
Start: 2023-04-06

## 2023-04-06 RX ORDER — SODIUM CHLORIDE 9 MG/ML
5-250 INJECTION, SOLUTION INTRAVENOUS PRN
Status: CANCELLED | OUTPATIENT
Start: 2023-04-06

## 2023-04-06 RX ORDER — EPINEPHRINE 1 MG/ML
0.3 INJECTION, SOLUTION, CONCENTRATE INTRAVENOUS PRN
OUTPATIENT
Start: 2023-04-06

## 2023-04-06 RX ORDER — ONDANSETRON 2 MG/ML
8 INJECTION INTRAMUSCULAR; INTRAVENOUS ONCE
Status: CANCELLED | OUTPATIENT
Start: 2023-04-06 | End: 2023-04-06

## 2023-04-06 RX ORDER — ONDANSETRON 2 MG/ML
8 INJECTION INTRAMUSCULAR; INTRAVENOUS ONCE
Status: COMPLETED | OUTPATIENT
Start: 2023-04-06 | End: 2023-04-06

## 2023-04-06 RX ORDER — SODIUM CHLORIDE 0.9 % (FLUSH) 0.9 %
10 SYRINGE (ML) INJECTION PRN
Status: DISCONTINUED | OUTPATIENT
Start: 2023-04-06 | End: 2023-04-07 | Stop reason: HOSPADM

## 2023-04-06 RX ORDER — HEPARIN SODIUM (PORCINE) LOCK FLUSH IV SOLN 100 UNIT/ML 100 UNIT/ML
500 SOLUTION INTRAVENOUS PRN
OUTPATIENT
Start: 2023-04-06

## 2023-04-06 RX ORDER — FAMOTIDINE 10 MG/ML
20 INJECTION, SOLUTION INTRAVENOUS
OUTPATIENT
Start: 2023-04-06

## 2023-04-06 RX ORDER — SODIUM CHLORIDE 9 MG/ML
INJECTION, SOLUTION INTRAVENOUS CONTINUOUS
OUTPATIENT
Start: 2023-04-06

## 2023-04-06 RX ORDER — DIPHENHYDRAMINE HYDROCHLORIDE 50 MG/ML
50 INJECTION INTRAMUSCULAR; INTRAVENOUS ONCE
Status: CANCELLED | OUTPATIENT
Start: 2023-04-06 | End: 2023-04-06

## 2023-04-06 RX ORDER — ACETAMINOPHEN 325 MG/1
650 TABLET ORAL
OUTPATIENT
Start: 2023-04-06

## 2023-04-06 RX ORDER — DIPHENHYDRAMINE HYDROCHLORIDE 50 MG/ML
50 INJECTION INTRAMUSCULAR; INTRAVENOUS
OUTPATIENT
Start: 2023-04-06

## 2023-04-06 RX ORDER — SODIUM CHLORIDE 0.9 % (FLUSH) 0.9 %
5-40 SYRINGE (ML) INJECTION PRN
OUTPATIENT
Start: 2023-04-06

## 2023-04-06 RX ORDER — ONDANSETRON 2 MG/ML
8 INJECTION INTRAMUSCULAR; INTRAVENOUS
OUTPATIENT
Start: 2023-04-06

## 2023-04-06 RX ORDER — MEPERIDINE HYDROCHLORIDE 50 MG/ML
12.5 INJECTION INTRAMUSCULAR; INTRAVENOUS; SUBCUTANEOUS PRN
OUTPATIENT
Start: 2023-04-06

## 2023-04-06 RX ORDER — DIPHENHYDRAMINE HYDROCHLORIDE 50 MG/ML
50 INJECTION INTRAMUSCULAR; INTRAVENOUS ONCE
Status: COMPLETED | OUTPATIENT
Start: 2023-04-06 | End: 2023-04-06

## 2023-04-06 RX ORDER — SODIUM CHLORIDE 0.9 % (FLUSH) 0.9 %
5-40 SYRINGE (ML) INJECTION PRN
Status: DISCONTINUED | OUTPATIENT
Start: 2023-04-06 | End: 2023-04-07 | Stop reason: HOSPADM

## 2023-04-06 RX ORDER — SODIUM CHLORIDE 0.9 % (FLUSH) 0.9 %
5-40 SYRINGE (ML) INJECTION PRN
Status: CANCELLED | OUTPATIENT
Start: 2023-04-06

## 2023-04-06 RX ORDER — ALBUTEROL SULFATE 90 UG/1
4 AEROSOL, METERED RESPIRATORY (INHALATION) PRN
OUTPATIENT
Start: 2023-04-06

## 2023-04-06 RX ORDER — DEXAMETHASONE SODIUM PHOSPHATE 10 MG/ML
10 INJECTION INTRAMUSCULAR; INTRAVENOUS ONCE
Status: COMPLETED | OUTPATIENT
Start: 2023-04-06 | End: 2023-04-06

## 2023-04-06 RX ORDER — SODIUM CHLORIDE 9 MG/ML
5-250 INJECTION, SOLUTION INTRAVENOUS PRN
Status: DISCONTINUED | OUTPATIENT
Start: 2023-04-06 | End: 2023-04-07 | Stop reason: HOSPADM

## 2023-04-06 RX ADMIN — SODIUM CHLORIDE, PRESERVATIVE FREE 10 ML: 5 INJECTION INTRAVENOUS at 10:56

## 2023-04-06 RX ADMIN — DIPHENHYDRAMINE HYDROCHLORIDE 50 MG: 50 INJECTION, SOLUTION INTRAMUSCULAR; INTRAVENOUS at 12:40

## 2023-04-06 RX ADMIN — SODIUM CHLORIDE, PRESERVATIVE FREE 10 ML: 5 INJECTION INTRAVENOUS at 16:14

## 2023-04-06 RX ADMIN — PACLITAXEL 126 MG: 6 INJECTION, SOLUTION, CONCENTRATE INTRAVENOUS at 15:00

## 2023-04-06 RX ADMIN — ONDANSETRON 8 MG: 2 INJECTION INTRAMUSCULAR; INTRAVENOUS at 14:22

## 2023-04-06 RX ADMIN — SODIUM CHLORIDE, PRESERVATIVE FREE 10 ML: 5 INJECTION INTRAVENOUS at 12:39

## 2023-04-06 RX ADMIN — SODIUM CHLORIDE 400 MG: 9 INJECTION, SOLUTION INTRAVENOUS at 13:09

## 2023-04-06 RX ADMIN — SODIUM CHLORIDE 25 ML/HR: 9 INJECTION, SOLUTION INTRAVENOUS at 12:39

## 2023-04-06 RX ADMIN — FAMOTIDINE 20 MG: 10 INJECTION, SOLUTION INTRAVENOUS at 14:25

## 2023-04-06 RX ADMIN — DEXAMETHASONE SODIUM PHOSPHATE 10 MG: 10 INJECTION INTRAMUSCULAR; INTRAVENOUS at 14:27

## 2023-04-06 ASSESSMENT — PATIENT HEALTH QUESTIONNAIRE - PHQ9
SUM OF ALL RESPONSES TO PHQ QUESTIONS 1-9: 0
SUM OF ALL RESPONSES TO PHQ QUESTIONS 1-9: 0
2. FEELING DOWN, DEPRESSED OR HOPELESS: 0
SUM OF ALL RESPONSES TO PHQ QUESTIONS 1-9: 0
1. LITTLE INTEREST OR PLEASURE IN DOING THINGS: 0
SUM OF ALL RESPONSES TO PHQ QUESTIONS 1-9: 0
SUM OF ALL RESPONSES TO PHQ9 QUESTIONS 1 & 2: 0

## 2023-04-06 NOTE — PATIENT INSTRUCTIONS
hours an answering service is available and will contact a provider for emergencies or if you are experiencing any of the above symptoms. Patient did express an interest in My Chart. My Chart log in information explained on the after visit summary printout at the [facility] [address] [facility] [address] desk.     Bell Shah RN, BSN  Nurse Navigator  [phone] [email or web address]

## 2023-04-06 NOTE — PROGRESS NOTES
4/6/23 saw pt today with Zev King NP for pre chemo cycle 7 day 1 taxol/cyramza. She is tolerating chemo well. Continues on TPN. Proceed to infusion. Follow up in 1 week. Encouraged to call with any concerns. Navigation will continue to follow.

## 2023-04-06 NOTE — PROGRESS NOTES
Arrived to the [facility]. Cyramza/Taxol completed. Patient tolerated well. Any issues or concerns during appointment: none. Patient aware of next infusion appointment on 4/13 (date) at 1:30 PM (time). Patient instructed to call provider with temperature of 100.4 or greater or nausea/vomiting/ diarrhea or pain not controlled by medications  Discharged via w/c.

## 2023-04-06 NOTE — PROGRESS NOTES
[facility] Hematology and Oncology: Office Visit Established Patient    Reason for follow up:    Jennifer Oconnor  is seen in follow-up for carcinoma of unknown primary. Overview: (copied from prior)  Ms. Rubia Barajas was seen for the first time in our office in February 2022. She was a woman of previously good health who began noticing left-sided back discomfort in early January 2022. This was associated  ultimately with a sense of difficulty swallowing and ultimately she presented to [facility] for evaluation. Her symptoms were felt to potentially be indicative of a bowel obstruction and she was sent for a CT scan. This study, performed on January 27, 2022  was notable for a linear calcific density along the course of the proximal left ureter with associated moderate hydronephrosis potentially indicative of an intraureteral calculus. There was also stranding throughout the retroperitoneal soft tissues anterior  to the left psoas muscle with pelvic ascites. There was also wall thickening involving the descending colon. The question of colitis or serosal implants was raised. The patient had undergone a gastric sleeve placement several years prior. She had  also undergone a negative colonoscopy about 3 years prior to these presentations. There was a history of anemia ultimately resulting in the performance of a hysterectomy approximately 3 years ago for menorrhagia. Because of the CT scan findings, she  was ultimately referred to Dr. Colin Brandon for evaluation of a possible pelvic malignancy. On February 1, 2022 he performed a laparoscopy and biopsy with evidence of peritoneal carcinomatosis grossly. A \"peritoneal nodule\" and a \"peritoneal implant\"  were both positive for a poorly differentiated metastatic carcinoma potentially consistent with an upper gastrointestinal primary. An omental biopsy showed no evidence of malignancy.   Immunohistochemistries were positive for cytokeratin 7 and negative  for

## 2023-04-06 NOTE — PROGRESS NOTES
Patient arrived to port lab for port access and lab draw   [facility] and labs drawn per [facility]   *Port remains accessed. Patient discharged from port lab in a wheelchair.

## 2023-04-13 ENCOUNTER — HOSPITAL ENCOUNTER (OUTPATIENT)
Dept: INFUSION THERAPY | Age: 48
Discharge: HOME | End: 2023-04-13
Payer: COMMERCIAL

## 2023-04-13 ENCOUNTER — TELEPHONE (OUTPATIENT)
Dept: ONCOLOGY | Age: 48
End: 2023-04-13

## 2023-04-13 DIAGNOSIS — Z79.899 HIGH RISK MEDICATION USE: ICD-10-CM

## 2023-04-13 DIAGNOSIS — C80.1 CARCINOMA OF UNKNOWN PRIMARY (HCC): ICD-10-CM

## 2023-04-13 DIAGNOSIS — C78.6 METASTASIS TO PERITONEUM OF UNKNOWN PRIMARY (HCC): Primary | ICD-10-CM

## 2023-04-13 DIAGNOSIS — C80.1 METASTASIS TO PERITONEUM OF UNKNOWN PRIMARY (HCC): Primary | ICD-10-CM

## 2023-04-13 LAB
ALBUMIN SERPL-MCNC: 2.5 G/DL (ref 3.5–5)
ALBUMIN/GLOB SERPL: 0.6 (ref 0.4–1.6)
ALP SERPL-CCNC: 129 U/L (ref 50–136)
ALT SERPL-CCNC: 27 U/L (ref 12–65)
ANION GAP SERPL CALC-SCNC: 5 MMOL/L (ref 2–11)
AST SERPL-CCNC: 31 U/L (ref 15–37)
BASOPHILS # BLD: 0 K/UL (ref 0–0.2)
BASOPHILS NFR BLD: 0 % (ref 0–2)
BILIRUB SERPL-MCNC: 0.3 MG/DL (ref 0.2–1.1)
BUN SERPL-MCNC: 24 MG/DL (ref 6–23)
CALCIUM SERPL-MCNC: 8.8 MG/DL (ref 8.3–10.4)
CHLORIDE SERPL-SCNC: 106 MMOL/L (ref 101–110)
CO2 SERPL-SCNC: 27 MMOL/L (ref 21–32)
CREAT SERPL-MCNC: 0.4 MG/DL (ref 0.6–1)
DIFFERENTIAL METHOD BLD: ABNORMAL
EOSINOPHIL # BLD: 0.1 K/UL (ref 0–0.8)
EOSINOPHIL NFR BLD: 3 % (ref 0.5–7.8)
ERYTHROCYTE [DISTWIDTH] IN BLOOD BY AUTOMATED COUNT: 20.1 % (ref 11.9–14.6)
GLOBULIN SER CALC-MCNC: 4.3 G/DL (ref 2.8–4.5)
GLUCOSE SERPL-MCNC: 115 MG/DL (ref 65–100)
HCT VFR BLD AUTO: 29.9 % (ref 35.8–46.3)
HGB BLD-MCNC: 8.7 G/DL (ref 11.7–15.4)
IMM GRANULOCYTES # BLD AUTO: 0.1 K/UL (ref 0–0.5)
IMM GRANULOCYTES NFR BLD AUTO: 2 % (ref 0–5)
LYMPHOCYTES # BLD: 1.2 K/UL (ref 0.5–4.6)
LYMPHOCYTES NFR BLD: 24 % (ref 13–44)
MAGNESIUM SERPL-MCNC: 2.2 MG/DL (ref 1.8–2.4)
MCH RBC QN AUTO: 25.7 PG (ref 26.1–32.9)
MCHC RBC AUTO-ENTMCNC: 29.1 G/DL (ref 31.4–35)
MCV RBC AUTO: 88.5 FL (ref 82–102)
MONOCYTES # BLD: 0.3 K/UL (ref 0.1–1.3)
MONOCYTES NFR BLD: 6 % (ref 4–12)
NEUTS SEG # BLD: 3.4 K/UL (ref 1.7–8.2)
NEUTS SEG NFR BLD: 65 % (ref 43–78)
NRBC # BLD: 0.07 K/UL (ref 0–0.2)
PLATELET # BLD AUTO: 413 K/UL (ref 150–450)
PMV BLD AUTO: 10.8 FL (ref 9.4–12.3)
POTASSIUM SERPL-SCNC: 4.4 MMOL/L (ref 3.5–5.1)
PROT SERPL-MCNC: 6.8 G/DL (ref 6.3–8.2)
RBC # BLD AUTO: 3.38 M/UL (ref 4.05–5.2)
SODIUM SERPL-SCNC: 138 MMOL/L (ref 133–143)
WBC # BLD AUTO: 5.2 K/UL (ref 4.3–11.1)

## 2023-04-13 PROCEDURE — 6360000002 HC RX W HCPCS: Performed by: NURSE PRACTITIONER

## 2023-04-13 PROCEDURE — 2580000003 HC RX 258: Performed by: INTERNAL MEDICINE

## 2023-04-13 PROCEDURE — 85025 COMPLETE CBC W/AUTO DIFF WBC: CPT

## 2023-04-13 PROCEDURE — A4216 STERILE WATER/SALINE, 10 ML: HCPCS | Performed by: NURSE PRACTITIONER

## 2023-04-13 PROCEDURE — 80053 COMPREHEN METABOLIC PANEL: CPT

## 2023-04-13 PROCEDURE — 83735 ASSAY OF MAGNESIUM: CPT

## 2023-04-13 PROCEDURE — 2580000003 HC RX 258: Performed by: NURSE PRACTITIONER

## 2023-04-13 PROCEDURE — 96375 TX/PRO/DX INJ NEW DRUG ADDON: CPT

## 2023-04-13 PROCEDURE — 36591 DRAW BLOOD OFF VENOUS DEVICE: CPT

## 2023-04-13 PROCEDURE — 96413 CHEMO IV INFUSION 1 HR: CPT

## 2023-04-13 PROCEDURE — 2500000003 HC RX 250 WO HCPCS: Performed by: NURSE PRACTITIONER

## 2023-04-13 RX ORDER — SODIUM CHLORIDE 9 MG/ML
5-250 INJECTION, SOLUTION INTRAVENOUS PRN
Status: DISCONTINUED | OUTPATIENT
Start: 2023-04-13 | End: 2023-04-14 | Stop reason: HOSPADM

## 2023-04-13 RX ORDER — DIPHENHYDRAMINE HYDROCHLORIDE 50 MG/ML
50 INJECTION INTRAMUSCULAR; INTRAVENOUS ONCE
Status: COMPLETED | OUTPATIENT
Start: 2023-04-13 | End: 2023-04-13

## 2023-04-13 RX ORDER — ONDANSETRON 2 MG/ML
8 INJECTION INTRAMUSCULAR; INTRAVENOUS ONCE
Status: COMPLETED | OUTPATIENT
Start: 2023-04-13 | End: 2023-04-13

## 2023-04-13 RX ORDER — SODIUM CHLORIDE 0.9 % (FLUSH) 0.9 %
5-40 SYRINGE (ML) INJECTION PRN
Status: DISCONTINUED | OUTPATIENT
Start: 2023-04-13 | End: 2023-04-14 | Stop reason: HOSPADM

## 2023-04-13 RX ORDER — DEXAMETHASONE SODIUM PHOSPHATE 10 MG/ML
10 INJECTION INTRAMUSCULAR; INTRAVENOUS ONCE
Status: COMPLETED | OUTPATIENT
Start: 2023-04-13 | End: 2023-04-13

## 2023-04-13 RX ORDER — SODIUM CHLORIDE 0.9 % (FLUSH) 0.9 %
10 SYRINGE (ML) INJECTION PRN
Status: DISCONTINUED | OUTPATIENT
Start: 2023-04-13 | End: 2023-04-14 | Stop reason: HOSPADM

## 2023-04-13 RX ADMIN — PACLITAXEL 126 MG: 6 INJECTION, SOLUTION, CONCENTRATE INTRAVENOUS at 14:15

## 2023-04-13 RX ADMIN — ONDANSETRON 8 MG: 2 INJECTION INTRAMUSCULAR; INTRAVENOUS at 13:40

## 2023-04-13 RX ADMIN — SODIUM CHLORIDE, PRESERVATIVE FREE 10 ML: 5 INJECTION INTRAVENOUS at 13:35

## 2023-04-13 RX ADMIN — SODIUM CHLORIDE, PRESERVATIVE FREE 10 ML: 5 INJECTION INTRAVENOUS at 15:25

## 2023-04-13 RX ADMIN — DEXAMETHASONE SODIUM PHOSPHATE 10 MG: 10 INJECTION INTRAMUSCULAR; INTRAVENOUS at 13:46

## 2023-04-13 RX ADMIN — SODIUM CHLORIDE, PRESERVATIVE FREE 10 ML: 5 INJECTION INTRAVENOUS at 12:36

## 2023-04-13 RX ADMIN — DIPHENHYDRAMINE HYDROCHLORIDE 50 MG: 50 INJECTION, SOLUTION INTRAMUSCULAR; INTRAVENOUS at 13:44

## 2023-04-13 RX ADMIN — FAMOTIDINE 20 MG: 10 INJECTION, SOLUTION INTRAVENOUS at 13:42

## 2023-04-13 RX ADMIN — SODIUM CHLORIDE 100 ML/HR: 9 INJECTION, SOLUTION INTRAVENOUS at 13:36

## 2023-04-13 NOTE — PROGRESS NOTES
Patient arrived to port lab for port access and lab draw   Helen Rhoades 45 accessed and labs drawn per protocol   Port remains accessed   Patient discharged from port lab via wheelchair

## 2023-04-13 NOTE — PROGRESS NOTES
4/13/2023  Pt to Infusion c/o fatigue with decreased appetite. Labs reviewed and pt received treatment per order, tolerated well. Patient instructed to call provider with temperature of 100.4 or greater, nausea/vomiting/diarrhea/pain not controlled by medications, or any other issues/concerns. Aware of next Infusion appt on  [email or web address] . Discharged home with family.

## 2023-04-20 ENCOUNTER — HOSPITAL ENCOUNTER (OUTPATIENT)
Dept: INFUSION THERAPY | Age: 48
Discharge: HOME | End: 2023-04-20
Payer: COMMERCIAL

## 2023-04-20 VITALS
RESPIRATION RATE: 16 BRPM | HEART RATE: 101 BPM | WEIGHT: 122.6 LBS | BODY MASS INDEX: 21.04 KG/M2 | DIASTOLIC BLOOD PRESSURE: 84 MMHG | OXYGEN SATURATION: 96 % | SYSTOLIC BLOOD PRESSURE: 130 MMHG | TEMPERATURE: 98.6 F

## 2023-04-20 DIAGNOSIS — C80.1 CARCINOMA OF UNKNOWN PRIMARY (HCC): ICD-10-CM

## 2023-04-20 DIAGNOSIS — C80.1 METASTASIS TO PERITONEUM OF UNKNOWN PRIMARY (HCC): Primary | ICD-10-CM

## 2023-04-20 DIAGNOSIS — C78.6 METASTASIS TO PERITONEUM OF UNKNOWN PRIMARY (HCC): Primary | ICD-10-CM

## 2023-04-20 LAB
ALBUMIN SERPL-MCNC: 2.5 G/DL (ref 3.5–5)
ALBUMIN/GLOB SERPL: 0.6 (ref 0.4–1.6)
ALP SERPL-CCNC: 125 U/L (ref 50–136)
ALT SERPL-CCNC: 23 U/L (ref 12–65)
ANION GAP SERPL CALC-SCNC: 6 MMOL/L (ref 2–11)
AST SERPL-CCNC: 26 U/L (ref 15–37)
BASOPHILS # BLD: 0 K/UL (ref 0–0.2)
BASOPHILS NFR BLD: 1 % (ref 0–2)
BILIRUB SERPL-MCNC: 0.3 MG/DL (ref 0.2–1.1)
BUN SERPL-MCNC: 24 MG/DL (ref 6–23)
CALCIUM SERPL-MCNC: 8.6 MG/DL (ref 8.3–10.4)
CHLORIDE SERPL-SCNC: 106 MMOL/L (ref 101–110)
CO2 SERPL-SCNC: 26 MMOL/L (ref 21–32)
CREAT SERPL-MCNC: 0.5 MG/DL (ref 0.6–1)
CREAT UR-MCNC: 66.9 MG/DL
DIFFERENTIAL METHOD BLD: ABNORMAL
EOSINOPHIL # BLD: 0.1 K/UL (ref 0–0.8)
EOSINOPHIL NFR BLD: 2 % (ref 0.5–7.8)
ERYTHROCYTE [DISTWIDTH] IN BLOOD BY AUTOMATED COUNT: 19.9 % (ref 11.9–14.6)
GLOBULIN SER CALC-MCNC: 4.3 G/DL (ref 2.8–4.5)
GLUCOSE SERPL-MCNC: 121 MG/DL (ref 65–100)
HCT VFR BLD AUTO: 28.2 % (ref 35.8–46.3)
HGB BLD-MCNC: 8.3 G/DL (ref 11.7–15.4)
IMM GRANULOCYTES # BLD AUTO: 0 K/UL (ref 0–0.5)
IMM GRANULOCYTES NFR BLD AUTO: 1 % (ref 0–5)
LYMPHOCYTES # BLD: 1.4 K/UL (ref 0.5–4.6)
LYMPHOCYTES NFR BLD: 32 % (ref 13–44)
MCH RBC QN AUTO: 25.8 PG (ref 26.1–32.9)
MCHC RBC AUTO-ENTMCNC: 29.4 G/DL (ref 31.4–35)
MCV RBC AUTO: 87.6 FL (ref 82–102)
MONOCYTES # BLD: 0.3 K/UL (ref 0.1–1.3)
MONOCYTES NFR BLD: 8 % (ref 4–12)
NEUTS SEG # BLD: 2.5 K/UL (ref 1.7–8.2)
NEUTS SEG NFR BLD: 56 % (ref 43–78)
NRBC # BLD: 0.05 K/UL (ref 0–0.2)
PLATELET # BLD AUTO: 432 K/UL (ref 150–450)
PMV BLD AUTO: 10.8 FL (ref 9.4–12.3)
POTASSIUM SERPL-SCNC: 4.4 MMOL/L (ref 3.5–5.1)
PROT SERPL-MCNC: 6.8 G/DL (ref 6.3–8.2)
PROT UR-MCNC: 140 MG/DL
PROT UR-MCNC: 140 MG/DL
PROT/CREAT UR-RTO: 2.1
RBC # BLD AUTO: 3.22 M/UL (ref 4.05–5.2)
SODIUM SERPL-SCNC: 138 MMOL/L (ref 133–143)
WBC # BLD AUTO: 4.3 K/UL (ref 4.3–11.1)

## 2023-04-20 PROCEDURE — 85025 COMPLETE CBC W/AUTO DIFF WBC: CPT

## 2023-04-20 PROCEDURE — 2580000003 HC RX 258: Performed by: NURSE PRACTITIONER

## 2023-04-20 PROCEDURE — 82570 ASSAY OF URINE CREATININE: CPT

## 2023-04-20 PROCEDURE — 80053 COMPREHEN METABOLIC PANEL: CPT

## 2023-04-20 PROCEDURE — 84156 ASSAY OF PROTEIN URINE: CPT

## 2023-04-20 PROCEDURE — 2500000003 HC RX 250 WO HCPCS: Performed by: NURSE PRACTITIONER

## 2023-04-20 PROCEDURE — 96375 TX/PRO/DX INJ NEW DRUG ADDON: CPT

## 2023-04-20 PROCEDURE — 6360000002 HC RX W HCPCS: Performed by: NURSE PRACTITIONER

## 2023-04-20 PROCEDURE — A4216 STERILE WATER/SALINE, 10 ML: HCPCS | Performed by: NURSE PRACTITIONER

## 2023-04-20 PROCEDURE — 96413 CHEMO IV INFUSION 1 HR: CPT

## 2023-04-20 RX ORDER — HEPARIN SODIUM (PORCINE) LOCK FLUSH IV SOLN 100 UNIT/ML 100 UNIT/ML
500 SOLUTION INTRAVENOUS PRN
OUTPATIENT
Start: 2023-04-20

## 2023-04-20 RX ORDER — SODIUM CHLORIDE 9 MG/ML
INJECTION, SOLUTION INTRAVENOUS CONTINUOUS
OUTPATIENT
Start: 2023-04-20

## 2023-04-20 RX ORDER — SODIUM CHLORIDE 9 MG/ML
5-250 INJECTION, SOLUTION INTRAVENOUS PRN
Status: DISCONTINUED | OUTPATIENT
Start: 2023-04-20 | End: 2023-04-21 | Stop reason: HOSPADM

## 2023-04-20 RX ORDER — ONDANSETRON 2 MG/ML
8 INJECTION INTRAMUSCULAR; INTRAVENOUS
OUTPATIENT
Start: 2023-04-20

## 2023-04-20 RX ORDER — MEPERIDINE HYDROCHLORIDE 25 MG/ML
12.5 INJECTION INTRAMUSCULAR; INTRAVENOUS; SUBCUTANEOUS PRN
OUTPATIENT
Start: 2023-04-20

## 2023-04-20 RX ORDER — SODIUM CHLORIDE 0.9 % (FLUSH) 0.9 %
5-40 SYRINGE (ML) INJECTION PRN
OUTPATIENT
Start: 2023-04-20

## 2023-04-20 RX ORDER — DIPHENHYDRAMINE HYDROCHLORIDE 50 MG/ML
50 INJECTION INTRAMUSCULAR; INTRAVENOUS
Status: DISCONTINUED | OUTPATIENT
Start: 2023-04-20 | End: 2023-04-21 | Stop reason: HOSPADM

## 2023-04-20 RX ORDER — EPINEPHRINE 1 MG/ML
0.3 INJECTION, SOLUTION, CONCENTRATE INTRAVENOUS PRN
OUTPATIENT
Start: 2023-04-20

## 2023-04-20 RX ORDER — DEXAMETHASONE SODIUM PHOSPHATE 10 MG/ML
10 INJECTION INTRAMUSCULAR; INTRAVENOUS ONCE
Status: COMPLETED | OUTPATIENT
Start: 2023-04-20 | End: 2023-04-20

## 2023-04-20 RX ORDER — DIPHENHYDRAMINE HYDROCHLORIDE 50 MG/ML
50 INJECTION INTRAMUSCULAR; INTRAVENOUS ONCE
Status: COMPLETED | OUTPATIENT
Start: 2023-04-20 | End: 2023-04-20

## 2023-04-20 RX ORDER — ALBUTEROL SULFATE 90 UG/1
4 AEROSOL, METERED RESPIRATORY (INHALATION) PRN
OUTPATIENT
Start: 2023-04-20

## 2023-04-20 RX ORDER — ONDANSETRON 2 MG/ML
8 INJECTION INTRAMUSCULAR; INTRAVENOUS ONCE
Status: COMPLETED | OUTPATIENT
Start: 2023-04-20 | End: 2023-04-20

## 2023-04-20 RX ORDER — ACETAMINOPHEN 325 MG/1
650 TABLET ORAL
OUTPATIENT
Start: 2023-04-20

## 2023-04-20 RX ORDER — SODIUM CHLORIDE 0.9 % (FLUSH) 0.9 %
5-40 SYRINGE (ML) INJECTION PRN
Status: DISCONTINUED | OUTPATIENT
Start: 2023-04-20 | End: 2023-04-21 | Stop reason: HOSPADM

## 2023-04-20 RX ORDER — SODIUM CHLORIDE 9 MG/ML
5-250 INJECTION, SOLUTION INTRAVENOUS PRN
OUTPATIENT
Start: 2023-04-20

## 2023-04-20 RX ADMIN — DEXAMETHASONE SODIUM PHOSPHATE 10 MG: 10 INJECTION INTRAMUSCULAR; INTRAVENOUS at 15:19

## 2023-04-20 RX ADMIN — SODIUM CHLORIDE, PRESERVATIVE FREE 10 ML: 5 INJECTION INTRAVENOUS at 13:30

## 2023-04-20 RX ADMIN — FAMOTIDINE 20 MG: 10 INJECTION, SOLUTION INTRAVENOUS at 15:29

## 2023-04-20 RX ADMIN — DIPHENHYDRAMINE HYDROCHLORIDE 50 MG: 50 INJECTION, SOLUTION INTRAMUSCULAR; INTRAVENOUS at 15:27

## 2023-04-20 RX ADMIN — ONDANSETRON 8 MG: 2 INJECTION INTRAMUSCULAR; INTRAVENOUS at 15:25

## 2023-04-20 RX ADMIN — SODIUM CHLORIDE 25 ML/HR: 9 INJECTION, SOLUTION INTRAVENOUS at 15:19

## 2023-04-20 RX ADMIN — PACLITAXEL 126 MG: 6 INJECTION, SOLUTION, CONCENTRATE INTRAVENOUS at 15:57

## 2023-04-20 RX ADMIN — SODIUM CHLORIDE, PRESERVATIVE FREE 10 ML: 5 INJECTION INTRAVENOUS at 17:00

## 2023-04-20 NOTE — PROGRESS NOTES
Arrived to the [facility]. Port lab/taxol completed. Patient tolerated well. Any issues or concerns during appointment: Urine protein 140 and urine cr ratio > +2 was held per PORSHA LARSEN. Patient aware of next infusion appointment on 5/4/23 (date) at 56 (time). Patient instructed to call provider with temperature of 100.4 or greater or nausea/vomiting/ diarrhea or pain not controlled by medications  Discharged in wheelchair with family. Erin Scanlon

## 2023-05-03 NOTE — PROGRESS NOTES
demonstrates no aggressive osseous lesions. Impression  1. Trace new left pleural effusion. 2. Upper abdominal ascites, incompletely imaged, with resultant abnormal contour  of the liver. This represents an interval change since the prior exam.          ASSESSMENT:  Ms. Sabrina Beltran has an apparent metastatic carcinoma of unknown primary. Pathologically, the tumor seems to resemble a process arising in the upper gastrointestinal tract. However, any such primary is  not visible on EGD or PET scanning. Dr. Caroline Perez presumption is that this may have originated from the colon based on its location and behavior. That too would be somewhat inconsistent with the pathology findings. She has been treated with FOLFOX and Avastin. There was certainly no symptomatic response and in fact she developed progressive and symptomatic ascites suggesting disease progression. She is now on TPN and receiving paclitaxel and ramucirumab. Although there have been no obvious radiographic changes, she does seem to be responding. The drainage of her ascites is much less. She is actually able to eat some food intake and nutrition bit by mouth. She continues to receive TPN. She seems a bit stronger. Protein calorie malnutrition-now on TPN     PLAN:  She is here today for follow up and next cycle of Taxol/Rami - C7D8. There have been no new changes this past week. There are no GI or bowel complaints. Appetite and weight are stable. Remains on TPN. Mild fatigue is ongoing. There is no cough, shortness of breath, or edema. Neuropathy is stable. No pain. She reports intermittent nail tenderness and drainage, non currently. No fevers or infectious symptoms. Labs reviewed and stable. Call with any fevers, uncontrolled side effects from treatment or any other worrisome/concerning symptoms. Return per protocol.   Scheduled to see GI on 4/27.       5/4/2023: Labs and physical exam are adequate to proceed with C8-D1 of Taxol +

## 2023-05-04 ENCOUNTER — HOSPITAL ENCOUNTER (OUTPATIENT)
Dept: INFUSION THERAPY | Age: 48
Discharge: HOME | End: 2023-05-04
Payer: COMMERCIAL

## 2023-05-04 ENCOUNTER — OFFICE VISIT (OUTPATIENT)
Dept: PALLATIVE CARE | Age: 48
End: 2023-05-04
Payer: COMMERCIAL

## 2023-05-04 ENCOUNTER — OFFICE VISIT (OUTPATIENT)
Dept: ONCOLOGY | Age: 48
End: 2023-05-04
Payer: COMMERCIAL

## 2023-05-04 VITALS
OXYGEN SATURATION: 100 % | SYSTOLIC BLOOD PRESSURE: 115 MMHG | RESPIRATION RATE: 16 BRPM | HEART RATE: 105 BPM | WEIGHT: 126.4 LBS | BODY MASS INDEX: 21.58 KG/M2 | TEMPERATURE: 98.2 F | HEIGHT: 64 IN | DIASTOLIC BLOOD PRESSURE: 85 MMHG

## 2023-05-04 DIAGNOSIS — Z79.899 HIGH RISK MEDICATION USE: ICD-10-CM

## 2023-05-04 DIAGNOSIS — C80.1 CARCINOMA OF UNKNOWN PRIMARY (HCC): ICD-10-CM

## 2023-05-04 DIAGNOSIS — D64.9 ANEMIA, UNSPECIFIED TYPE: Primary | ICD-10-CM

## 2023-05-04 DIAGNOSIS — Z51.5 ENCOUNTER FOR PALLIATIVE CARE: ICD-10-CM

## 2023-05-04 DIAGNOSIS — C78.6 METASTASIS TO PERITONEUM OF UNKNOWN PRIMARY (HCC): ICD-10-CM

## 2023-05-04 DIAGNOSIS — C80.1 METASTASIS TO PERITONEUM OF UNKNOWN PRIMARY (HCC): ICD-10-CM

## 2023-05-04 DIAGNOSIS — D64.9 ANEMIA, UNSPECIFIED TYPE: ICD-10-CM

## 2023-05-04 DIAGNOSIS — C80.1 METASTASIS TO PERITONEUM OF UNKNOWN PRIMARY (HCC): Primary | ICD-10-CM

## 2023-05-04 DIAGNOSIS — C78.6 METASTASIS TO PERITONEUM OF UNKNOWN PRIMARY (HCC): Primary | ICD-10-CM

## 2023-05-04 DIAGNOSIS — R12 HEARTBURN: Primary | ICD-10-CM

## 2023-05-04 LAB
ALBUMIN SERPL-MCNC: 2.3 G/DL (ref 3.5–5)
ALBUMIN/GLOB SERPL: 0.6 (ref 0.4–1.6)
ALP SERPL-CCNC: 113 U/L (ref 50–136)
ALT SERPL-CCNC: 24 U/L (ref 12–65)
ANION GAP SERPL CALC-SCNC: 3 MMOL/L (ref 2–11)
AST SERPL-CCNC: 24 U/L (ref 15–37)
BASOPHILS # BLD: 0 K/UL (ref 0–0.2)
BASOPHILS NFR BLD: 0 % (ref 0–2)
BILIRUB SERPL-MCNC: 0.2 MG/DL (ref 0.2–1.1)
BUN SERPL-MCNC: 23 MG/DL (ref 6–23)
CALCIUM SERPL-MCNC: 8.8 MG/DL (ref 8.3–10.4)
CHLORIDE SERPL-SCNC: 109 MMOL/L (ref 101–110)
CO2 SERPL-SCNC: 28 MMOL/L (ref 21–32)
CREAT SERPL-MCNC: 0.4 MG/DL (ref 0.6–1)
DIFFERENTIAL METHOD BLD: ABNORMAL
EOSINOPHIL # BLD: 0.1 K/UL (ref 0–0.8)
EOSINOPHIL NFR BLD: 1 % (ref 0.5–7.8)
ERYTHROCYTE [DISTWIDTH] IN BLOOD BY AUTOMATED COUNT: 21.4 % (ref 11.9–14.6)
FERRITIN SERPL-MCNC: 69 NG/ML (ref 8–388)
GLOBULIN SER CALC-MCNC: 4.1 G/DL (ref 2.8–4.5)
GLUCOSE SERPL-MCNC: 109 MG/DL (ref 65–100)
HCT VFR BLD AUTO: 27.7 % (ref 35.8–46.3)
HGB BLD-MCNC: 8 G/DL (ref 11.7–15.4)
IMM GRANULOCYTES # BLD AUTO: 0 K/UL (ref 0–0.5)
IMM GRANULOCYTES NFR BLD AUTO: 0 % (ref 0–5)
LYMPHOCYTES # BLD: 1.5 K/UL (ref 0.5–4.6)
LYMPHOCYTES NFR BLD: 19 % (ref 13–44)
MAGNESIUM SERPL-MCNC: 2.3 MG/DL (ref 1.8–2.4)
MCH RBC QN AUTO: 25.6 PG (ref 26.1–32.9)
MCHC RBC AUTO-ENTMCNC: 28.9 G/DL (ref 31.4–35)
MCV RBC AUTO: 88.8 FL (ref 82–102)
MONOCYTES # BLD: 1 K/UL (ref 0.1–1.3)
MONOCYTES NFR BLD: 12 % (ref 4–12)
NEUTS SEG # BLD: 5.3 K/UL (ref 1.7–8.2)
NEUTS SEG NFR BLD: 68 % (ref 43–78)
NRBC # BLD: 0 K/UL (ref 0–0.2)
PLATELET # BLD AUTO: 495 K/UL (ref 150–450)
PMV BLD AUTO: 10 FL (ref 9.4–12.3)
POTASSIUM SERPL-SCNC: 4.3 MMOL/L (ref 3.5–5.1)
PROT SERPL-MCNC: 6.4 G/DL (ref 6.3–8.2)
PROT UR-MCNC: 46 MG/DL
RBC # BLD AUTO: 3.12 M/UL (ref 4.05–5.2)
SODIUM SERPL-SCNC: 140 MMOL/L (ref 133–143)
WBC # BLD AUTO: 7.9 K/UL (ref 4.3–11.1)

## 2023-05-04 PROCEDURE — 99212 OFFICE O/P EST SF 10 MIN: CPT | Performed by: NURSE PRACTITIONER

## 2023-05-04 PROCEDURE — 96417 CHEMO IV INFUS EACH ADDL SEQ: CPT

## 2023-05-04 PROCEDURE — 84156 ASSAY OF PROTEIN URINE: CPT

## 2023-05-04 PROCEDURE — 36591 DRAW BLOOD OFF VENOUS DEVICE: CPT

## 2023-05-04 PROCEDURE — 82728 ASSAY OF FERRITIN: CPT

## 2023-05-04 PROCEDURE — 2580000003 HC RX 258: Performed by: INTERNAL MEDICINE

## 2023-05-04 PROCEDURE — 2500000003 HC RX 250 WO HCPCS: Performed by: INTERNAL MEDICINE

## 2023-05-04 PROCEDURE — 6360000002 HC RX W HCPCS: Performed by: INTERNAL MEDICINE

## 2023-05-04 PROCEDURE — 80053 COMPREHEN METABOLIC PANEL: CPT

## 2023-05-04 PROCEDURE — A4216 STERILE WATER/SALINE, 10 ML: HCPCS | Performed by: INTERNAL MEDICINE

## 2023-05-04 PROCEDURE — 83735 ASSAY OF MAGNESIUM: CPT

## 2023-05-04 PROCEDURE — 85025 COMPLETE CBC W/AUTO DIFF WBC: CPT

## 2023-05-04 PROCEDURE — 96375 TX/PRO/DX INJ NEW DRUG ADDON: CPT

## 2023-05-04 PROCEDURE — 96413 CHEMO IV INFUSION 1 HR: CPT

## 2023-05-04 PROCEDURE — 99215 OFFICE O/P EST HI 40 MIN: CPT | Performed by: INTERNAL MEDICINE

## 2023-05-04 RX ORDER — EPINEPHRINE 1 MG/ML
0.3 INJECTION, SOLUTION, CONCENTRATE INTRAVENOUS PRN
Status: CANCELLED | OUTPATIENT
Start: 2023-05-04

## 2023-05-04 RX ORDER — ALBUTEROL SULFATE 90 UG/1
4 AEROSOL, METERED RESPIRATORY (INHALATION) PRN
OUTPATIENT
Start: 2023-05-11

## 2023-05-04 RX ORDER — SUCRALFATE 1 G/1
1 TABLET ORAL 4 TIMES DAILY
Qty: 120 TABLET | Refills: 3 | Status: SHIPPED | OUTPATIENT
Start: 2023-05-04

## 2023-05-04 RX ORDER — SODIUM CHLORIDE 9 MG/ML
INJECTION, SOLUTION INTRAVENOUS CONTINUOUS
OUTPATIENT
Start: 2023-05-18

## 2023-05-04 RX ORDER — SODIUM CHLORIDE 0.9 % (FLUSH) 0.9 %
5-40 SYRINGE (ML) INJECTION PRN
Status: DISCONTINUED | OUTPATIENT
Start: 2023-05-04 | End: 2023-05-05 | Stop reason: HOSPADM

## 2023-05-04 RX ORDER — DIPHENHYDRAMINE HYDROCHLORIDE 50 MG/ML
50 INJECTION INTRAMUSCULAR; INTRAVENOUS
Status: CANCELLED | OUTPATIENT
Start: 2023-05-04

## 2023-05-04 RX ORDER — FAMOTIDINE 10 MG/ML
20 INJECTION, SOLUTION INTRAVENOUS
OUTPATIENT
Start: 2023-05-11

## 2023-05-04 RX ORDER — ALBUTEROL SULFATE 90 UG/1
4 AEROSOL, METERED RESPIRATORY (INHALATION) PRN
OUTPATIENT
Start: 2023-05-18

## 2023-05-04 RX ORDER — SODIUM CHLORIDE 0.9 % (FLUSH) 0.9 %
5-40 SYRINGE (ML) INJECTION PRN
OUTPATIENT
Start: 2023-05-11

## 2023-05-04 RX ORDER — DIPHENHYDRAMINE HYDROCHLORIDE 50 MG/ML
50 INJECTION INTRAMUSCULAR; INTRAVENOUS ONCE
Status: COMPLETED | OUTPATIENT
Start: 2023-05-04 | End: 2023-05-04

## 2023-05-04 RX ORDER — SODIUM CHLORIDE 9 MG/ML
5-250 INJECTION, SOLUTION INTRAVENOUS PRN
OUTPATIENT
Start: 2023-05-11

## 2023-05-04 RX ORDER — SODIUM CHLORIDE 9 MG/ML
INJECTION, SOLUTION INTRAVENOUS CONTINUOUS
OUTPATIENT
Start: 2023-05-11

## 2023-05-04 RX ORDER — MEPERIDINE HYDROCHLORIDE 50 MG/ML
12.5 INJECTION INTRAMUSCULAR; INTRAVENOUS; SUBCUTANEOUS PRN
OUTPATIENT
Start: 2023-05-11

## 2023-05-04 RX ORDER — ONDANSETRON 2 MG/ML
8 INJECTION INTRAMUSCULAR; INTRAVENOUS
Status: CANCELLED | OUTPATIENT
Start: 2023-05-04

## 2023-05-04 RX ORDER — SODIUM CHLORIDE 9 MG/ML
5-250 INJECTION, SOLUTION INTRAVENOUS PRN
OUTPATIENT
Start: 2023-05-18

## 2023-05-04 RX ORDER — ACETAMINOPHEN 325 MG/1
650 TABLET ORAL
OUTPATIENT
Start: 2023-05-11

## 2023-05-04 RX ORDER — DIPHENHYDRAMINE HYDROCHLORIDE 50 MG/ML
50 INJECTION INTRAMUSCULAR; INTRAVENOUS
Status: DISCONTINUED | OUTPATIENT
Start: 2023-05-04 | End: 2023-05-05 | Stop reason: HOSPADM

## 2023-05-04 RX ORDER — MEPERIDINE HYDROCHLORIDE 50 MG/ML
12.5 INJECTION INTRAMUSCULAR; INTRAVENOUS; SUBCUTANEOUS PRN
OUTPATIENT
Start: 2023-05-18

## 2023-05-04 RX ORDER — SODIUM CHLORIDE 0.9 % (FLUSH) 0.9 %
5-40 SYRINGE (ML) INJECTION PRN
Status: CANCELLED | OUTPATIENT
Start: 2023-05-04

## 2023-05-04 RX ORDER — DIPHENHYDRAMINE HYDROCHLORIDE 50 MG/ML
50 INJECTION INTRAMUSCULAR; INTRAVENOUS ONCE
OUTPATIENT
Start: 2023-05-11 | End: 2023-05-11

## 2023-05-04 RX ORDER — EPINEPHRINE 1 MG/ML
0.3 INJECTION, SOLUTION, CONCENTRATE INTRAVENOUS PRN
OUTPATIENT
Start: 2023-05-11

## 2023-05-04 RX ORDER — FAMOTIDINE 10 MG/ML
20 INJECTION, SOLUTION INTRAVENOUS ONCE
OUTPATIENT
Start: 2023-05-11 | End: 2023-05-11

## 2023-05-04 RX ORDER — HEPARIN SODIUM (PORCINE) LOCK FLUSH IV SOLN 100 UNIT/ML 100 UNIT/ML
500 SOLUTION INTRAVENOUS PRN
Status: CANCELLED | OUTPATIENT
Start: 2023-05-04

## 2023-05-04 RX ORDER — DIPHENHYDRAMINE HYDROCHLORIDE 50 MG/ML
50 INJECTION INTRAMUSCULAR; INTRAVENOUS ONCE
OUTPATIENT
Start: 2023-05-18 | End: 2023-05-18

## 2023-05-04 RX ORDER — FAMOTIDINE 10 MG/ML
20 INJECTION, SOLUTION INTRAVENOUS ONCE
Status: CANCELLED | OUTPATIENT
Start: 2023-05-04 | End: 2023-05-04

## 2023-05-04 RX ORDER — EPINEPHRINE 1 MG/ML
0.3 INJECTION, SOLUTION, CONCENTRATE INTRAVENOUS PRN
OUTPATIENT
Start: 2023-05-18

## 2023-05-04 RX ORDER — ONDANSETRON 2 MG/ML
8 INJECTION INTRAMUSCULAR; INTRAVENOUS ONCE
Status: COMPLETED | OUTPATIENT
Start: 2023-05-04 | End: 2023-05-04

## 2023-05-04 RX ORDER — DEXAMETHASONE SODIUM PHOSPHATE 10 MG/ML
10 INJECTION INTRAMUSCULAR; INTRAVENOUS ONCE
Status: COMPLETED | OUTPATIENT
Start: 2023-05-04 | End: 2023-05-04

## 2023-05-04 RX ORDER — SODIUM CHLORIDE 9 MG/ML
5-250 INJECTION, SOLUTION INTRAVENOUS PRN
Status: DISCONTINUED | OUTPATIENT
Start: 2023-05-04 | End: 2023-05-05 | Stop reason: HOSPADM

## 2023-05-04 RX ORDER — FAMOTIDINE 10 MG/ML
20 INJECTION, SOLUTION INTRAVENOUS
Status: CANCELLED | OUTPATIENT
Start: 2023-05-04

## 2023-05-04 RX ORDER — ONDANSETRON 2 MG/ML
8 INJECTION INTRAMUSCULAR; INTRAVENOUS
OUTPATIENT
Start: 2023-05-18

## 2023-05-04 RX ORDER — MEPERIDINE HYDROCHLORIDE 50 MG/ML
12.5 INJECTION INTRAMUSCULAR; INTRAVENOUS; SUBCUTANEOUS PRN
Status: CANCELLED | OUTPATIENT
Start: 2023-05-04

## 2023-05-04 RX ORDER — DIPHENHYDRAMINE HYDROCHLORIDE 50 MG/ML
50 INJECTION INTRAMUSCULAR; INTRAVENOUS
OUTPATIENT
Start: 2023-05-11

## 2023-05-04 RX ORDER — SODIUM CHLORIDE 9 MG/ML
5-250 INJECTION, SOLUTION INTRAVENOUS PRN
Status: CANCELLED | OUTPATIENT
Start: 2023-05-04

## 2023-05-04 RX ORDER — ACETAMINOPHEN 325 MG/1
650 TABLET ORAL
OUTPATIENT
Start: 2023-05-18

## 2023-05-04 RX ORDER — ONDANSETRON 2 MG/ML
8 INJECTION INTRAMUSCULAR; INTRAVENOUS
OUTPATIENT
Start: 2023-05-11

## 2023-05-04 RX ORDER — SODIUM CHLORIDE 0.9 % (FLUSH) 0.9 %
5-40 SYRINGE (ML) INJECTION PRN
OUTPATIENT
Start: 2023-05-18

## 2023-05-04 RX ORDER — ACETAMINOPHEN 325 MG/1
650 TABLET ORAL
Status: CANCELLED | OUTPATIENT
Start: 2023-05-04

## 2023-05-04 RX ORDER — ONDANSETRON 2 MG/ML
8 INJECTION INTRAMUSCULAR; INTRAVENOUS ONCE
Status: CANCELLED | OUTPATIENT
Start: 2023-05-04 | End: 2023-05-04

## 2023-05-04 RX ORDER — SODIUM CHLORIDE 9 MG/ML
INJECTION, SOLUTION INTRAVENOUS CONTINUOUS
Status: CANCELLED | OUTPATIENT
Start: 2023-05-04

## 2023-05-04 RX ORDER — HEPARIN SODIUM (PORCINE) LOCK FLUSH IV SOLN 100 UNIT/ML 100 UNIT/ML
500 SOLUTION INTRAVENOUS PRN
OUTPATIENT
Start: 2023-05-18

## 2023-05-04 RX ORDER — DIPHENHYDRAMINE HYDROCHLORIDE 50 MG/ML
50 INJECTION INTRAMUSCULAR; INTRAVENOUS
OUTPATIENT
Start: 2023-05-18

## 2023-05-04 RX ORDER — SODIUM CHLORIDE 0.9 % (FLUSH) 0.9 %
10 SYRINGE (ML) INJECTION PRN
Status: DISCONTINUED | OUTPATIENT
Start: 2023-05-04 | End: 2023-05-05 | Stop reason: HOSPADM

## 2023-05-04 RX ORDER — ONDANSETRON 2 MG/ML
8 INJECTION INTRAMUSCULAR; INTRAVENOUS ONCE
OUTPATIENT
Start: 2023-05-11 | End: 2023-05-11

## 2023-05-04 RX ORDER — HEPARIN SODIUM (PORCINE) LOCK FLUSH IV SOLN 100 UNIT/ML 100 UNIT/ML
500 SOLUTION INTRAVENOUS PRN
OUTPATIENT
Start: 2023-05-11

## 2023-05-04 RX ORDER — FAMOTIDINE 10 MG/ML
20 INJECTION, SOLUTION INTRAVENOUS ONCE
OUTPATIENT
Start: 2023-05-18 | End: 2023-05-18

## 2023-05-04 RX ORDER — FAMOTIDINE 10 MG/ML
20 INJECTION, SOLUTION INTRAVENOUS
OUTPATIENT
Start: 2023-05-18

## 2023-05-04 RX ORDER — ONDANSETRON 2 MG/ML
8 INJECTION INTRAMUSCULAR; INTRAVENOUS ONCE
OUTPATIENT
Start: 2023-05-18 | End: 2023-05-18

## 2023-05-04 RX ORDER — DIPHENHYDRAMINE HYDROCHLORIDE 50 MG/ML
50 INJECTION INTRAMUSCULAR; INTRAVENOUS ONCE
Status: CANCELLED | OUTPATIENT
Start: 2023-05-04 | End: 2023-05-04

## 2023-05-04 RX ORDER — ALBUTEROL SULFATE 90 UG/1
4 AEROSOL, METERED RESPIRATORY (INHALATION) PRN
Status: CANCELLED | OUTPATIENT
Start: 2023-05-04

## 2023-05-04 RX ADMIN — DIPHENHYDRAMINE HYDROCHLORIDE 50 MG: 50 INJECTION, SOLUTION INTRAMUSCULAR; INTRAVENOUS at 11:10

## 2023-05-04 RX ADMIN — DEXAMETHASONE SODIUM PHOSPHATE 10 MG: 10 INJECTION INTRAMUSCULAR; INTRAVENOUS at 13:02

## 2023-05-04 RX ADMIN — SODIUM CHLORIDE 400 MG: 9 INJECTION, SOLUTION INTRAVENOUS at 11:43

## 2023-05-04 RX ADMIN — SODIUM CHLORIDE 30 ML/HR: 9 INJECTION, SOLUTION INTRAVENOUS at 11:12

## 2023-05-04 RX ADMIN — FAMOTIDINE 20 MG: 10 INJECTION, SOLUTION INTRAVENOUS at 12:58

## 2023-05-04 RX ADMIN — ONDANSETRON 8 MG: 2 INJECTION INTRAMUSCULAR; INTRAVENOUS at 12:50

## 2023-05-04 RX ADMIN — PACLITAXEL 126 MG: 6 INJECTION, SOLUTION, CONCENTRATE INTRAVENOUS at 13:45

## 2023-05-04 RX ADMIN — SODIUM CHLORIDE, PRESERVATIVE FREE 10 ML: 5 INJECTION INTRAVENOUS at 10:37

## 2023-05-04 RX ADMIN — SODIUM CHLORIDE, PRESERVATIVE FREE 10 ML: 5 INJECTION INTRAVENOUS at 15:02

## 2023-05-04 RX ADMIN — SODIUM CHLORIDE, PRESERVATIVE FREE 10 ML: 5 INJECTION INTRAVENOUS at 09:10

## 2023-05-04 ASSESSMENT — PATIENT HEALTH QUESTIONNAIRE - PHQ9
SUM OF ALL RESPONSES TO PHQ QUESTIONS 1-9: 0
SUM OF ALL RESPONSES TO PHQ QUESTIONS 1-9: 0
2. FEELING DOWN, DEPRESSED OR HOPELESS: 0
SUM OF ALL RESPONSES TO PHQ QUESTIONS 1-9: 0
SUM OF ALL RESPONSES TO PHQ QUESTIONS 1-9: 0

## 2023-05-04 ASSESSMENT — ENCOUNTER SYMPTOMS
NAUSEA: 0
ABDOMINAL PAIN: 0

## 2023-05-04 NOTE — PATIENT INSTRUCTIONS
emergencies or if you are experiencing any of the above symptoms. Patient does express an interest in My Chart. My Chart log in information explained on the after visit summary printout at the . Heather [facility] 90 desk.     Marieta Dubin, RN  Nurse Navigator  [address] [facility] [address] [phone]

## 2023-05-04 NOTE — PROGRESS NOTES
Patient arrived to port lab ambulatory for port access and lab draw   Port accessed and labs drawn per protocol. *Port remains accessed. Patient discharged from port lab ambulatory.

## 2023-05-04 NOTE — PROGRESS NOTES
Arrived to the [facility]. Taxol/cyramza completed. Patient tolerated well. Port remains accessed for home TPN. Any issues or concerns during appointment: none. Patient aware of next infusion appointment on 5/11/23 (date) at 11:00am (time). Patient instructed to call provider with temperature of 100.4 or greater or nausea/vomiting/ diarrhea or pain not controlled by medications  Discharged via [facility] with caregiver.

## 2023-05-04 NOTE — PROGRESS NOTES
88.8 82.0 - 102.0 FL    MCH 25.6 (L) 26.1 - 32.9 PG    MCHC 28.9 (L) 31.4 - 35.0 g/dL    RDW 21.4 (H) 11.9 - 14.6 %    Platelets 391 (H) 603 - 450 K/uL    MPV 10.0 9.4 - 12.3 FL    nRBC 0.00 0.0 - 0.2 K/uL    Seg Neutrophils 68 43 - 78 %    Lymphocytes 19 13 - 44 %    Monocytes 12 4.0 - 12.0 %    Eosinophils % 1 0.5 - 7.8 %    Basophils 0 0.0 - 2.0 %    Immature Granulocytes 0 0.0 - 5.0 %    Segs Absolute 5.3 1.7 - 8.2 K/UL    Absolute Lymph # 1.5 0.5 - 4.6 K/UL    Absolute Mono # 1.0 0.1 - 1.3 K/UL    Absolute Eos # 0.1 0.0 - 0.8 K/UL    Basophils Absolute 0.0 0.0 - 0.2 K/UL    Absolute Immature Granulocyte 0.0 0.0 - 0.5 K/UL    Differential Type AUTOMATED     Comprehensive Metabolic Panel    Collection Time: 05/04/23  9:01 AM   Result Value Ref Range    Sodium 140 133 - 143 mmol/L    Potassium 4.3 3.5 - 5.1 mmol/L    Chloride 109 101 - 110 mmol/L    CO2 28 21 - 32 mmol/L    Anion Gap 3 2 - 11 mmol/L    Glucose 109 (H) 65 - 100 mg/dL    BUN 23 6 - 23 MG/DL    Creatinine 0.40 (L) 0.6 - 1.0 MG/DL    Est, Glom Filt Rate >60 >60 ml/min/1.73m2    Calcium 8.8 8.3 - 10.4 MG/DL    Total Bilirubin 0.2 0.2 - 1.1 MG/DL    ALT 24 12 - 65 U/L    AST 24 15 - 37 U/L    Alk Phosphatase 113 50 - 136 U/L    Total Protein 6.4 6.3 - 8.2 g/dL    Albumin 2.3 (L) 3.5 - 5.0 g/dL    Globulin 4.1 2.8 - 4.5 g/dL    Albumin/Globulin Ratio 0.6 0.4 - 1.6     Magnesium    Collection Time: 05/04/23  9:01 AM   Result Value Ref Range    Magnesium 2.3 1.8 - 2.4 mg/dL   Protein, urine, random    Collection Time: 05/04/23  9:01 AM   Result Value Ref Range    Protein, Urine, Random 46 (H) <11.9 mg/dL   Ferritin    Collection Time: 05/04/23  9:01 AM   Result Value Ref Range    Ferritin 69 8 - 388 NG/ML       Imaging:  No results found for this or any previous visit. ASSESSMENT:   Diagnosis Orders   1. Heartburn        2. Carcinoma of unknown primary ([facility] [address]        3.  Encounter for palliative care              PLAN:  Lab studies and imaging studies were

## 2023-05-09 ENCOUNTER — HOSPITAL ENCOUNTER (OUTPATIENT)
Dept: LAB | Age: 48
Discharge: HOME | End: 2023-05-12
Payer: COMMERCIAL

## 2023-05-09 LAB
ALBUMIN SERPL-MCNC: 2.3 G/DL (ref 3.5–5)
ALBUMIN/GLOB SERPL: 0.6 (ref 0.4–1.6)
ALP SERPL-CCNC: 113 U/L (ref 50–136)
ALT SERPL-CCNC: 35 U/L (ref 12–65)
ANION GAP SERPL CALC-SCNC: 4 MMOL/L (ref 2–11)
AST SERPL-CCNC: 37 U/L (ref 15–37)
BASOPHILS # BLD: 0 K/UL (ref 0–0.2)
BASOPHILS NFR BLD: 0 % (ref 0–2)
BILIRUB SERPL-MCNC: 0.3 MG/DL (ref 0.2–1.1)
BUN SERPL-MCNC: 25 MG/DL (ref 6–23)
CALCIUM SERPL-MCNC: 9.2 MG/DL (ref 8.3–10.4)
CHLORIDE SERPL-SCNC: 106 MMOL/L (ref 101–110)
CO2 SERPL-SCNC: 27 MMOL/L (ref 21–32)
CREAT SERPL-MCNC: 0.4 MG/DL (ref 0.6–1)
DIFFERENTIAL METHOD BLD: ABNORMAL
EOSINOPHIL # BLD: 0.1 K/UL (ref 0–0.8)
EOSINOPHIL NFR BLD: 1 % (ref 0.5–7.8)
ERYTHROCYTE [DISTWIDTH] IN BLOOD BY AUTOMATED COUNT: 21.2 % (ref 11.9–14.6)
GLOBULIN SER CALC-MCNC: 4.1 G/DL (ref 2.8–4.5)
GLUCOSE SERPL-MCNC: 90 MG/DL (ref 65–100)
HCT VFR BLD AUTO: 28.2 % (ref 35.8–46.3)
HGB BLD-MCNC: 8.4 G/DL (ref 11.7–15.4)
IMM GRANULOCYTES # BLD AUTO: 0 K/UL (ref 0–0.5)
IMM GRANULOCYTES NFR BLD AUTO: 0 % (ref 0–5)
LYMPHOCYTES # BLD: 1.6 K/UL (ref 0.5–4.6)
LYMPHOCYTES NFR BLD: 20 % (ref 13–44)
MAGNESIUM SERPL-MCNC: 2 MG/DL (ref 1.8–2.4)
MCH RBC QN AUTO: 26.3 PG (ref 26.1–32.9)
MCHC RBC AUTO-ENTMCNC: 29.8 G/DL (ref 31.4–35)
MCV RBC AUTO: 88.1 FL (ref 82–102)
MONOCYTES # BLD: 0.2 K/UL (ref 0.1–1.3)
MONOCYTES NFR BLD: 3 % (ref 4–12)
NEUTS SEG # BLD: 6 K/UL (ref 1.7–8.2)
NEUTS SEG NFR BLD: 76 % (ref 43–78)
NRBC # BLD: 0.02 K/UL (ref 0–0.2)
PHOSPHATE SERPL-MCNC: 3.7 MG/DL (ref 2.5–4.5)
PLATELET # BLD AUTO: 495 K/UL (ref 150–450)
PMV BLD AUTO: 11.6 FL (ref 9.4–12.3)
POTASSIUM SERPL-SCNC: 4.3 MMOL/L (ref 3.5–5.1)
PREALB SERPL-MCNC: 14.1 MG/DL (ref 18–35.7)
PROT SERPL-MCNC: 6.4 G/DL (ref 6.3–8.2)
RBC # BLD AUTO: 3.2 M/UL (ref 4.05–5.2)
SODIUM SERPL-SCNC: 137 MMOL/L (ref 133–143)
TRIGL SERPL-MCNC: 76 MG/DL (ref 35–150)
WBC # BLD AUTO: 7.9 K/UL (ref 4.3–11.1)

## 2023-05-09 PROCEDURE — 84134 ASSAY OF PREALBUMIN: CPT

## 2023-05-09 PROCEDURE — 83735 ASSAY OF MAGNESIUM: CPT

## 2023-05-09 PROCEDURE — 84478 ASSAY OF TRIGLYCERIDES: CPT

## 2023-05-09 PROCEDURE — 84100 ASSAY OF PHOSPHORUS: CPT

## 2023-05-09 PROCEDURE — 85025 COMPLETE CBC W/AUTO DIFF WBC: CPT

## 2023-05-09 PROCEDURE — 80053 COMPREHEN METABOLIC PANEL: CPT

## 2023-05-11 ENCOUNTER — HOSPITAL ENCOUNTER (OUTPATIENT)
Dept: INFUSION THERAPY | Age: 48
Discharge: HOME | End: 2023-05-11
Payer: COMMERCIAL

## 2023-05-11 VITALS
WEIGHT: 125.2 LBS | DIASTOLIC BLOOD PRESSURE: 78 MMHG | TEMPERATURE: 98.8 F | RESPIRATION RATE: 16 BRPM | SYSTOLIC BLOOD PRESSURE: 114 MMHG | BODY MASS INDEX: 21.49 KG/M2 | HEART RATE: 97 BPM | OXYGEN SATURATION: 97 %

## 2023-05-11 DIAGNOSIS — C80.1 CARCINOMA OF UNKNOWN PRIMARY (HCC): ICD-10-CM

## 2023-05-11 DIAGNOSIS — C80.1 METASTASIS TO PERITONEUM OF UNKNOWN PRIMARY (HCC): Primary | ICD-10-CM

## 2023-05-11 DIAGNOSIS — C78.6 METASTASIS TO PERITONEUM OF UNKNOWN PRIMARY (HCC): Primary | ICD-10-CM

## 2023-05-11 PROCEDURE — 96413 CHEMO IV INFUSION 1 HR: CPT

## 2023-05-11 PROCEDURE — 96375 TX/PRO/DX INJ NEW DRUG ADDON: CPT

## 2023-05-11 PROCEDURE — 2500000003 HC RX 250 WO HCPCS: Performed by: INTERNAL MEDICINE

## 2023-05-11 PROCEDURE — 2580000003 HC RX 258: Performed by: INTERNAL MEDICINE

## 2023-05-11 PROCEDURE — A4216 STERILE WATER/SALINE, 10 ML: HCPCS | Performed by: INTERNAL MEDICINE

## 2023-05-11 PROCEDURE — 6360000002 HC RX W HCPCS: Performed by: INTERNAL MEDICINE

## 2023-05-11 RX ORDER — DEXAMETHASONE SODIUM PHOSPHATE 10 MG/ML
10 INJECTION INTRAMUSCULAR; INTRAVENOUS ONCE
Status: COMPLETED | OUTPATIENT
Start: 2023-05-11 | End: 2023-05-11

## 2023-05-11 RX ORDER — SODIUM CHLORIDE 9 MG/ML
5-250 INJECTION, SOLUTION INTRAVENOUS PRN
Status: DISCONTINUED | OUTPATIENT
Start: 2023-05-11 | End: 2023-05-12 | Stop reason: HOSPADM

## 2023-05-11 RX ORDER — SODIUM CHLORIDE 9 MG/ML
INJECTION, SOLUTION INTRAVENOUS CONTINUOUS
Status: DISCONTINUED | OUTPATIENT
Start: 2023-05-11 | End: 2023-05-12 | Stop reason: HOSPADM

## 2023-05-11 RX ORDER — SODIUM CHLORIDE 0.9 % (FLUSH) 0.9 %
5-40 SYRINGE (ML) INJECTION PRN
Status: DISCONTINUED | OUTPATIENT
Start: 2023-05-11 | End: 2023-05-12 | Stop reason: HOSPADM

## 2023-05-11 RX ORDER — DIPHENHYDRAMINE HYDROCHLORIDE 50 MG/ML
50 INJECTION INTRAMUSCULAR; INTRAVENOUS ONCE
Status: COMPLETED | OUTPATIENT
Start: 2023-05-11 | End: 2023-05-11

## 2023-05-11 RX ORDER — ONDANSETRON 2 MG/ML
8 INJECTION INTRAMUSCULAR; INTRAVENOUS
Status: DISCONTINUED | OUTPATIENT
Start: 2023-05-11 | End: 2023-05-12 | Stop reason: HOSPADM

## 2023-05-11 RX ORDER — ONDANSETRON 2 MG/ML
8 INJECTION INTRAMUSCULAR; INTRAVENOUS ONCE
Status: COMPLETED | OUTPATIENT
Start: 2023-05-11 | End: 2023-05-11

## 2023-05-11 RX ORDER — DIPHENHYDRAMINE HYDROCHLORIDE 50 MG/ML
50 INJECTION INTRAMUSCULAR; INTRAVENOUS
Status: DISCONTINUED | OUTPATIENT
Start: 2023-05-11 | End: 2023-05-12 | Stop reason: HOSPADM

## 2023-05-11 RX ORDER — ACETAMINOPHEN 325 MG/1
650 TABLET ORAL
Status: DISCONTINUED | OUTPATIENT
Start: 2023-05-11 | End: 2023-05-12 | Stop reason: HOSPADM

## 2023-05-11 RX ORDER — ALBUTEROL SULFATE 90 UG/1
4 AEROSOL, METERED RESPIRATORY (INHALATION) PRN
Status: DISCONTINUED | OUTPATIENT
Start: 2023-05-11 | End: 2023-05-12 | Stop reason: HOSPADM

## 2023-05-11 RX ORDER — EPINEPHRINE 1 MG/ML
0.3 INJECTION, SOLUTION, CONCENTRATE INTRAVENOUS PRN
Status: DISCONTINUED | OUTPATIENT
Start: 2023-05-11 | End: 2023-05-12 | Stop reason: HOSPADM

## 2023-05-11 RX ADMIN — FAMOTIDINE 20 MG: 10 INJECTION, SOLUTION INTRAVENOUS at 11:39

## 2023-05-11 RX ADMIN — DEXAMETHASONE SODIUM PHOSPHATE 10 MG: 10 INJECTION INTRAMUSCULAR; INTRAVENOUS at 11:43

## 2023-05-11 RX ADMIN — PACLITAXEL 126 MG: 6 INJECTION, SOLUTION, CONCENTRATE INTRAVENOUS at 12:18

## 2023-05-11 RX ADMIN — SODIUM CHLORIDE, PRESERVATIVE FREE 10 ML: 5 INJECTION INTRAVENOUS at 11:10

## 2023-05-11 RX ADMIN — DIPHENHYDRAMINE HYDROCHLORIDE 50 MG: 50 INJECTION, SOLUTION INTRAMUSCULAR; INTRAVENOUS at 11:45

## 2023-05-11 RX ADMIN — SODIUM CHLORIDE, PRESERVATIVE FREE 10 ML: 5 INJECTION INTRAVENOUS at 13:30

## 2023-05-11 RX ADMIN — ONDANSETRON 8 MG: 2 INJECTION INTRAMUSCULAR; INTRAVENOUS at 11:36

## 2023-05-11 RX ADMIN — SODIUM CHLORIDE 20 ML/HR: 9 INJECTION, SOLUTION INTRAVENOUS at 11:10

## 2023-05-11 NOTE — PROGRESS NOTES
Arrived to the [facility]. Assessment completed, labs reviewed. Taxol completed. Patient tolerated without problems. Any issues or concerns during appointment: None  Patient instructed to call provider with temperature of 100.4 or greater or nausea/vomiting/ diarrhea or pain not controlled by medications  Instructed to call Dr Mayra Kelley with any side effects or other concerns  Patient aware of next infusion appointment on 5/18/23(date) at 6 30 (time).   Discharged via W/C with mother  Williansharla Bonillaclaire needle changed during visit and needle left in port as she receives TPN at home

## 2023-05-18 ENCOUNTER — HOSPITAL ENCOUNTER (OUTPATIENT)
Dept: INFUSION THERAPY | Age: 48
Discharge: HOME | End: 2023-05-18
Payer: COMMERCIAL

## 2023-05-18 ENCOUNTER — OFFICE VISIT (OUTPATIENT)
Dept: ONCOLOGY | Age: 48
End: 2023-05-18

## 2023-05-18 ENCOUNTER — OFFICE VISIT (OUTPATIENT)
Dept: ONCOLOGY | Age: 48
End: 2023-05-18
Payer: COMMERCIAL

## 2023-05-18 VITALS
HEIGHT: 64 IN | OXYGEN SATURATION: 99 % | RESPIRATION RATE: 14 BRPM | HEART RATE: 98 BPM | WEIGHT: 126 LBS | DIASTOLIC BLOOD PRESSURE: 80 MMHG | TEMPERATURE: 98.7 F | SYSTOLIC BLOOD PRESSURE: 114 MMHG | BODY MASS INDEX: 21.51 KG/M2

## 2023-05-18 DIAGNOSIS — K21.9 GASTROESOPHAGEAL REFLUX DISEASE, UNSPECIFIED WHETHER ESOPHAGITIS PRESENT: ICD-10-CM

## 2023-05-18 DIAGNOSIS — Z00.8 NUTRITIONAL ASSESSMENT: Primary | ICD-10-CM

## 2023-05-18 DIAGNOSIS — C80.1 METASTASIS TO PERITONEUM OF UNKNOWN PRIMARY (HCC): ICD-10-CM

## 2023-05-18 DIAGNOSIS — C80.1 CARCINOMA OF UNKNOWN PRIMARY (HCC): ICD-10-CM

## 2023-05-18 DIAGNOSIS — C78.6 PERITONEAL CARCINOMATOSIS (HCC): ICD-10-CM

## 2023-05-18 DIAGNOSIS — C78.6 METASTASIS TO PERITONEUM OF UNKNOWN PRIMARY (HCC): Primary | ICD-10-CM

## 2023-05-18 DIAGNOSIS — C78.6 METASTASIS TO PERITONEUM OF UNKNOWN PRIMARY (HCC): ICD-10-CM

## 2023-05-18 DIAGNOSIS — C80.1 METASTASIS TO PERITONEUM OF UNKNOWN PRIMARY (HCC): Primary | ICD-10-CM

## 2023-05-18 DIAGNOSIS — R53.83 FATIGUE DUE TO TREATMENT: ICD-10-CM

## 2023-05-18 DIAGNOSIS — G62.0 CHEMOTHERAPY-INDUCED NEUROPATHY (HCC): ICD-10-CM

## 2023-05-18 DIAGNOSIS — Z78.9 ON TOTAL PARENTERAL NUTRITION (TPN): ICD-10-CM

## 2023-05-18 DIAGNOSIS — T45.1X5A CHEMOTHERAPY-INDUCED NEUROPATHY (HCC): ICD-10-CM

## 2023-05-18 DIAGNOSIS — L03.019 PARONYCHIA OF FINGER, UNSPECIFIED LATERALITY: ICD-10-CM

## 2023-05-18 DIAGNOSIS — C80.1 CARCINOMA OF UNKNOWN PRIMARY (HCC): Primary | ICD-10-CM

## 2023-05-18 LAB
ALBUMIN SERPL-MCNC: 2.3 G/DL (ref 3.5–5)
ALBUMIN/GLOB SERPL: 0.5 (ref 0.4–1.6)
ALP SERPL-CCNC: 123 U/L (ref 50–136)
ALT SERPL-CCNC: 29 U/L (ref 12–65)
ANION GAP SERPL CALC-SCNC: 8 MMOL/L (ref 2–11)
APPEARANCE UR: ABNORMAL
AST SERPL-CCNC: 29 U/L (ref 15–37)
BACTERIA URNS QL MICRO: ABNORMAL /HPF
BASOPHILS # BLD: 0 K/UL (ref 0–0.2)
BASOPHILS NFR BLD: 0 % (ref 0–2)
BILIRUB SERPL-MCNC: 0.3 MG/DL (ref 0.2–1.1)
BILIRUB UR QL: NEGATIVE
BUN SERPL-MCNC: 24 MG/DL (ref 6–23)
CALCIUM SERPL-MCNC: 8.9 MG/DL (ref 8.3–10.4)
CASTS URNS QL MICRO: 0 /LPF
CHLORIDE SERPL-SCNC: 107 MMOL/L (ref 101–110)
CO2 SERPL-SCNC: 26 MMOL/L (ref 21–32)
COLOR UR: ABNORMAL
CREAT SERPL-MCNC: 0.5 MG/DL (ref 0.6–1)
CRYSTALS URNS QL MICRO: 0 /LPF
DIFFERENTIAL METHOD BLD: ABNORMAL
EOSINOPHIL # BLD: 0.1 K/UL (ref 0–0.8)
EOSINOPHIL NFR BLD: 2 % (ref 0.5–7.8)
EPI CELLS #/AREA URNS HPF: ABNORMAL /HPF
ERYTHROCYTE [DISTWIDTH] IN BLOOD BY AUTOMATED COUNT: 21.4 % (ref 11.9–14.6)
GLOBULIN SER CALC-MCNC: 4.3 G/DL (ref 2.8–4.5)
GLUCOSE SERPL-MCNC: 115 MG/DL (ref 65–100)
GLUCOSE UR STRIP.AUTO-MCNC: NEGATIVE MG/DL
HCT VFR BLD AUTO: 27.1 % (ref 35.8–46.3)
HGB BLD-MCNC: 8 G/DL (ref 11.7–15.4)
HGB UR QL STRIP: ABNORMAL
IMM GRANULOCYTES # BLD AUTO: 0.1 K/UL (ref 0–0.5)
IMM GRANULOCYTES NFR BLD AUTO: 1 % (ref 0–5)
KETONES UR QL STRIP.AUTO: NEGATIVE MG/DL
LEUKOCYTE ESTERASE UR QL STRIP.AUTO: ABNORMAL
LYMPHOCYTES # BLD: 1.6 K/UL (ref 0.5–4.6)
LYMPHOCYTES NFR BLD: 30 % (ref 13–44)
MCH RBC QN AUTO: 25.8 PG (ref 26.1–32.9)
MCHC RBC AUTO-ENTMCNC: 29.5 G/DL (ref 31.4–35)
MCV RBC AUTO: 87.4 FL (ref 82–102)
MONOCYTES # BLD: 0.4 K/UL (ref 0.1–1.3)
MONOCYTES NFR BLD: 7 % (ref 4–12)
MUCOUS THREADS URNS QL MICRO: ABNORMAL /LPF
NEUTS SEG # BLD: 3.1 K/UL (ref 1.7–8.2)
NEUTS SEG NFR BLD: 60 % (ref 43–78)
NITRITE UR QL STRIP.AUTO: NEGATIVE
NRBC # BLD: 0.1 K/UL (ref 0–0.2)
PH UR STRIP: 7 (ref 5–9)
PLATELET # BLD AUTO: 448 K/UL (ref 150–450)
PMV BLD AUTO: 10.5 FL (ref 9.4–12.3)
POTASSIUM SERPL-SCNC: 4.4 MMOL/L (ref 3.5–5.1)
PROT SERPL-MCNC: 6.6 G/DL (ref 6.3–8.2)
PROT UR STRIP-MCNC: 100 MG/DL
RBC # BLD AUTO: 3.1 M/UL (ref 4.05–5.2)
RBC #/AREA URNS HPF: >100 /HPF
SODIUM SERPL-SCNC: 141 MMOL/L (ref 133–143)
SP GR UR REFRACTOMETRY: 1.02 (ref 1–1.02)
UROBILINOGEN UR QL STRIP.AUTO: 1 EU/DL
WBC # BLD AUTO: 5.3 K/UL (ref 4.3–11.1)
WBC URNS QL MICRO: ABNORMAL /HPF

## 2023-05-18 PROCEDURE — 96413 CHEMO IV INFUSION 1 HR: CPT

## 2023-05-18 PROCEDURE — 36591 DRAW BLOOD OFF VENOUS DEVICE: CPT

## 2023-05-18 PROCEDURE — 2580000003 HC RX 258: Performed by: INTERNAL MEDICINE

## 2023-05-18 PROCEDURE — 85025 COMPLETE CBC W/AUTO DIFF WBC: CPT

## 2023-05-18 PROCEDURE — 2500000003 HC RX 250 WO HCPCS: Performed by: INTERNAL MEDICINE

## 2023-05-18 PROCEDURE — 96417 CHEMO IV INFUS EACH ADDL SEQ: CPT

## 2023-05-18 PROCEDURE — 96375 TX/PRO/DX INJ NEW DRUG ADDON: CPT

## 2023-05-18 PROCEDURE — 81001 URINALYSIS AUTO W/SCOPE: CPT

## 2023-05-18 PROCEDURE — 6360000002 HC RX W HCPCS: Performed by: INTERNAL MEDICINE

## 2023-05-18 PROCEDURE — 99214 OFFICE O/P EST MOD 30 MIN: CPT | Performed by: NURSE PRACTITIONER

## 2023-05-18 PROCEDURE — 80053 COMPREHEN METABOLIC PANEL: CPT

## 2023-05-18 PROCEDURE — A4216 STERILE WATER/SALINE, 10 ML: HCPCS | Performed by: INTERNAL MEDICINE

## 2023-05-18 RX ORDER — ONDANSETRON 2 MG/ML
8 INJECTION INTRAMUSCULAR; INTRAVENOUS ONCE
Status: COMPLETED | OUTPATIENT
Start: 2023-05-18 | End: 2023-05-18

## 2023-05-18 RX ORDER — ONDANSETRON 2 MG/ML
8 INJECTION INTRAMUSCULAR; INTRAVENOUS
Status: DISCONTINUED | OUTPATIENT
Start: 2023-05-18 | End: 2023-05-19 | Stop reason: HOSPADM

## 2023-05-18 RX ORDER — ALBUTEROL SULFATE 90 UG/1
4 AEROSOL, METERED RESPIRATORY (INHALATION) PRN
Status: DISCONTINUED | OUTPATIENT
Start: 2023-05-18 | End: 2023-05-19 | Stop reason: HOSPADM

## 2023-05-18 RX ORDER — DEXAMETHASONE SODIUM PHOSPHATE 10 MG/ML
10 INJECTION INTRAMUSCULAR; INTRAVENOUS ONCE
Status: COMPLETED | OUTPATIENT
Start: 2023-05-18 | End: 2023-05-18

## 2023-05-18 RX ORDER — SODIUM CHLORIDE 9 MG/ML
5-250 INJECTION, SOLUTION INTRAVENOUS PRN
Status: DISCONTINUED | OUTPATIENT
Start: 2023-05-18 | End: 2023-05-19 | Stop reason: HOSPADM

## 2023-05-18 RX ORDER — SODIUM CHLORIDE 0.9 % (FLUSH) 0.9 %
5-40 SYRINGE (ML) INJECTION PRN
Status: DISCONTINUED | OUTPATIENT
Start: 2023-05-18 | End: 2023-05-19 | Stop reason: HOSPADM

## 2023-05-18 RX ORDER — SODIUM CHLORIDE 0.9 % (FLUSH) 0.9 %
10 SYRINGE (ML) INJECTION PRN
Status: DISCONTINUED | OUTPATIENT
Start: 2023-05-18 | End: 2023-05-19 | Stop reason: HOSPADM

## 2023-05-18 RX ORDER — EPINEPHRINE 1 MG/ML
0.3 INJECTION, SOLUTION, CONCENTRATE INTRAVENOUS PRN
Status: DISCONTINUED | OUTPATIENT
Start: 2023-05-18 | End: 2023-05-19 | Stop reason: HOSPADM

## 2023-05-18 RX ORDER — CEPHALEXIN 500 MG/1
500 CAPSULE ORAL 4 TIMES DAILY
Qty: 40 CAPSULE | Refills: 0 | Status: SHIPPED | OUTPATIENT
Start: 2023-05-18 | End: 2023-05-28

## 2023-05-18 RX ORDER — MEPERIDINE HYDROCHLORIDE 25 MG/ML
12.5 INJECTION INTRAMUSCULAR; INTRAVENOUS; SUBCUTANEOUS PRN
Status: DISCONTINUED | OUTPATIENT
Start: 2023-05-18 | End: 2023-05-19 | Stop reason: HOSPADM

## 2023-05-18 RX ORDER — DIPHENHYDRAMINE HYDROCHLORIDE 50 MG/ML
50 INJECTION INTRAMUSCULAR; INTRAVENOUS ONCE
Status: COMPLETED | OUTPATIENT
Start: 2023-05-18 | End: 2023-05-18

## 2023-05-18 RX ORDER — DIPHENHYDRAMINE HYDROCHLORIDE 50 MG/ML
50 INJECTION INTRAMUSCULAR; INTRAVENOUS
Status: DISCONTINUED | OUTPATIENT
Start: 2023-05-18 | End: 2023-05-19 | Stop reason: HOSPADM

## 2023-05-18 RX ORDER — ACETAMINOPHEN 325 MG/1
650 TABLET ORAL
Status: DISCONTINUED | OUTPATIENT
Start: 2023-05-18 | End: 2023-05-19 | Stop reason: HOSPADM

## 2023-05-18 RX ADMIN — SODIUM CHLORIDE 400 MG: 9 INJECTION, SOLUTION INTRAVENOUS at 13:27

## 2023-05-18 RX ADMIN — SODIUM CHLORIDE, PRESERVATIVE FREE 10 ML: 5 INJECTION INTRAVENOUS at 10:50

## 2023-05-18 RX ADMIN — SODIUM CHLORIDE, PRESERVATIVE FREE 10 ML: 5 INJECTION INTRAVENOUS at 12:25

## 2023-05-18 RX ADMIN — ONDANSETRON 8 MG: 2 INJECTION INTRAMUSCULAR; INTRAVENOUS at 14:33

## 2023-05-18 RX ADMIN — DEXAMETHASONE SODIUM PHOSPHATE 10 MG: 10 INJECTION INTRAMUSCULAR; INTRAVENOUS at 14:35

## 2023-05-18 RX ADMIN — DIPHENHYDRAMINE HYDROCHLORIDE 50 MG: 50 INJECTION, SOLUTION INTRAMUSCULAR; INTRAVENOUS at 12:29

## 2023-05-18 RX ADMIN — FAMOTIDINE 20 MG: 10 INJECTION, SOLUTION INTRAVENOUS at 14:31

## 2023-05-18 RX ADMIN — SODIUM CHLORIDE 100 ML/HR: 9 INJECTION, SOLUTION INTRAVENOUS at 12:25

## 2023-05-18 RX ADMIN — PACLITAXEL 126 MG: 6 INJECTION, SOLUTION, CONCENTRATE INTRAVENOUS at 15:08

## 2023-05-18 ASSESSMENT — PATIENT HEALTH QUESTIONNAIRE - PHQ9
1. LITTLE INTEREST OR PLEASURE IN DOING THINGS: 0
2. FEELING DOWN, DEPRESSED OR HOPELESS: 0
SUM OF ALL RESPONSES TO PHQ9 QUESTIONS 1 & 2: 0
SUM OF ALL RESPONSES TO PHQ QUESTIONS 1-9: 0

## 2023-05-18 NOTE — PROGRESS NOTES
There have been no new changes this past week. There are no GI or bowel complaints. Appetite and weight are stable. Remains on TPN. Mild fatigue is ongoing. There is no cough, shortness of breath, or edema. Neuropathy is stable. No pain. She reports intermittent nail tenderness and drainage, non currently. No fevers or infectious symptoms. Labs reviewed and stable. Call with any fevers, uncontrolled side effects from treatment or any other worrisome/concerning symptoms. Return per protocol. Scheduled to see GI on 4/27.       5/4/2023: Labs and physical exam are adequate to proceed with C8-D1 of Taxol + ramucirumab. Reiterated palliative intent of treatment. She has been prescribed Protonix and Carafate by GI. Recent EGD showed normal esophagus. Plan to obtain restaging scans after this cycle. Recommended supportive care for nail changes likely related to Taxol. RoV with labs per treatment plan. 5/18/2023: Here for E2D39 Taxol/Cyramza and tolerating well overall. For paronychia start keflex x 10 days, continue epsom salt soaks. She was encouraged to add prn Gaviscon to QID carafate for GERD. TPN x 12 hours is ongoing. Labs reviewed and adequate to proceed with tx as planned. Restaging CT prior to next cycle, return in 2 weeks.            Shanice Nicholas) [address] [place] [address] Hematology and Oncology  [address] [place] [address]  Office : [phone]  Fax : [phone]

## 2023-05-18 NOTE — PROGRESS NOTES
Arrived to the [facility]. Cyramza/Taxol completed. Patient tolerated well. Any issues or concerns during appointment: no.  Patient aware of next infusion appointment on 6/1/23 (date) at 56 (time). Patient aware of next lab and [facility] office visit on 6/1/23 (date) at [address] (time). Patient instructed to call provider with temperature of 100.4 or greater or nausea/vomiting/ diarrhea or pain not controlled by medications  Discharged ambulatory.

## 2023-05-18 NOTE — PROGRESS NOTES
Patient arrived to port lab for port access and lab draw   [facility] accessed and labs drawn per protocol   Port remains accessed  Patient discharged from port lab via wheelchair

## 2023-05-22 DIAGNOSIS — C80.1 CARCINOMA OF UNKNOWN PRIMARY (HCC): Primary | ICD-10-CM

## 2023-05-22 LAB
APPEARANCE UR: ABNORMAL
BACTERIA URNS QL MICRO: ABNORMAL /HPF
BILIRUB UR QL: NEGATIVE
CASTS URNS QL MICRO: 0 /LPF
COLOR UR: ABNORMAL
CRYSTALS URNS QL MICRO: 0 /LPF
EPI CELLS #/AREA URNS HPF: ABNORMAL /HPF
GLUCOSE UR STRIP.AUTO-MCNC: NEGATIVE MG/DL
HGB UR QL STRIP: ABNORMAL
KETONES UR QL STRIP.AUTO: NEGATIVE MG/DL
LEUKOCYTE ESTERASE UR QL STRIP.AUTO: ABNORMAL
MUCOUS THREADS URNS QL MICRO: ABNORMAL /LPF
NITRITE UR QL STRIP.AUTO: NEGATIVE
PH UR STRIP: 7 (ref 5–9)
PROT UR STRIP-MCNC: 100 MG/DL
RBC #/AREA URNS HPF: >100 /HPF
SP GR UR REFRACTOMETRY: 1.02 (ref 1–1.02)
UROBILINOGEN UR QL STRIP.AUTO: 1 EU/DL
WBC URNS QL MICRO: ABNORMAL /HPF

## 2023-05-22 NOTE — PROGRESS NOTES
Nutrition F/U:  Assessment:  Pt seen during office visit w/ NP, receiving Cyramza + Taxol today (D15 C8). Pt remains TPN dependent for estimated nutrition needs, managed by [facility] and infusing over 12hrs/day. Pt continues to drain her abdominal pleurx every 2 weeks w/ ~250 mL output. Pt tolerates liquids po, but cannot consume anything more advanced due to vomiting bile, c/o persistent reflux despite use of Protonix and Carafate, instructed to try OTC Gaviscon. Current BW: 126#, up 7# over the past 2 months since last RD visit. Intervention:  1. Continue w/ liquid diet as tolerated for pleasure   2. Continue w/ TPN, managed by [facility]   3. Try Gaviscon, continue w/ Protonix and Carafate QID as prescribed. Monitoring/Evaluation:  1. RD to follow up during next office visit - follow up wt status, tolerance/intake of po diet, symptom management.       [address] [place] [address], [address], [address] [facility]

## 2023-05-24 ENCOUNTER — HOSPITAL ENCOUNTER (OUTPATIENT)
Dept: INFUSION THERAPY | Age: 48
Discharge: HOME | End: 2023-05-24
Payer: COMMERCIAL

## 2023-05-24 VITALS
DIASTOLIC BLOOD PRESSURE: 84 MMHG | TEMPERATURE: 98 F | SYSTOLIC BLOOD PRESSURE: 122 MMHG | RESPIRATION RATE: 16 BRPM | HEART RATE: 101 BPM | OXYGEN SATURATION: 100 %

## 2023-05-24 PROCEDURE — 96360 HYDRATION IV INFUSION INIT: CPT

## 2023-05-24 PROCEDURE — 2580000003 HC RX 258: Performed by: INTERNAL MEDICINE

## 2023-05-24 RX ORDER — SODIUM CHLORIDE 9 MG/ML
INJECTION, SOLUTION INTRAVENOUS ONCE
Status: COMPLETED | OUTPATIENT
Start: 2023-05-24 | End: 2023-05-24

## 2023-05-24 RX ORDER — SODIUM CHLORIDE 0.9 % (FLUSH) 0.9 %
5-40 SYRINGE (ML) INJECTION PRN
Status: DISCONTINUED | OUTPATIENT
Start: 2023-05-24 | End: 2023-05-24

## 2023-05-24 RX ORDER — SODIUM CHLORIDE 0.9 % (FLUSH) 0.9 %
5-40 SYRINGE (ML) INJECTION PRN
Status: DISCONTINUED | OUTPATIENT
Start: 2023-05-24 | End: 2023-05-25 | Stop reason: HOSPADM

## 2023-05-24 RX ORDER — SODIUM CHLORIDE 0.9 % (FLUSH) 0.9 %
5-40 SYRINGE (ML) INJECTION 2 TIMES DAILY
Status: DISCONTINUED | OUTPATIENT
Start: 2023-05-24 | End: 2023-05-24

## 2023-05-24 RX ADMIN — SODIUM CHLORIDE: 9 INJECTION, SOLUTION INTRAVENOUS at 15:45

## 2023-05-24 RX ADMIN — SODIUM CHLORIDE, PRESERVATIVE FREE 10 ML: 5 INJECTION INTRAVENOUS at 15:45

## 2023-05-24 NOTE — PROGRESS NOTES
Arrived to the [facility]. 1L NS bolus completed. Patient tolerated well. Any issues or concerns during appointment: none. Patient aware of next infusion appointment on 6/1/23 (date) at 8:45 AM (time). Patient instructed to call provider with temperature of 100.4 or greater or nausea/vomiting/diarrhea or pain not controlled by medications  Discharged ambulatory with family.

## 2023-05-30 ENCOUNTER — HOSPITAL ENCOUNTER (OUTPATIENT)
Dept: CT IMAGING | Age: 48
Discharge: HOME | End: 2023-06-02

## 2023-05-30 DIAGNOSIS — C80.1 CARCINOMA OF UNKNOWN PRIMARY (HCC): ICD-10-CM

## 2023-05-30 DIAGNOSIS — C78.6 METASTASIS TO PERITONEUM OF UNKNOWN PRIMARY (HCC): ICD-10-CM

## 2023-05-30 DIAGNOSIS — C80.1 METASTASIS TO PERITONEUM OF UNKNOWN PRIMARY (HCC): ICD-10-CM

## 2023-05-31 NOTE — PROGRESS NOTES
is no cough, shortness of breath, or edema. Neuropathy is stable. No pain. She reports intermittent nail tenderness and drainage, non currently. No fevers or infectious symptoms. Labs reviewed and stable. Call with any fevers, uncontrolled side effects from treatment or any other worrisome/concerning symptoms. Return per protocol. Scheduled to see GI on 4/27.       5/4/2023: Labs and physical exam are adequate to proceed with C8-D1 of Taxol + ramucirumab. Reiterated palliative intent of treatment. She has been prescribed Protonix and Carafate by GI. Recent EGD showed normal esophagus. Plan to obtain restaging scans after this cycle. Recommended supportive care for nail changes likely related to Taxol. RoV with labs per treatment plan. 5/18/2023: Here for Y5Z38 Taxol/Cyramza and tolerating well overall. For paronychia start keflex x 10 days, continue epsom salt soaks. She was encouraged to add prn Gaviscon to QID carafate for GERD. TPN x 12 hours is ongoing. Labs reviewed and adequate to proceed with tx as planned. Restaging CT prior to next cycle, return in 2 weeks. 6/1/2023: Reviewed most recent CT findings with the patient. Stable peritoneal carcinomatosis with loculated ascites was noted. Discussed on phone with Dr. Lisa Strange (IR), air-fluid level and decreased emptying is seen in esophagus-small hiatal hernia is a possibility. She will be referred back to gastroenterology for consideration of EGD. She will also be referred back to interventional radiology in an attempt to improve peritoneal drainage. She will continue taking PPI, Carafate and promotility agents as prescribed by her GI provider. I have reduced the dose of Taxol to 70 mg per m2 based on persistent and somewhat progressive nail changes. ROV with labs per tx plan.       Randy Gomez MD  [facility] Hematology and Oncology  [address] [place] [address], [address] [place] [address]  Office : [phone]  Fax : [phone]

## 2023-06-01 ENCOUNTER — OFFICE VISIT (OUTPATIENT)
Dept: ONCOLOGY | Age: 48
End: 2023-06-01

## 2023-06-01 ENCOUNTER — HOSPITAL ENCOUNTER (OUTPATIENT)
Dept: INFUSION THERAPY | Age: 48
Discharge: HOME | End: 2023-06-01
Payer: COMMERCIAL

## 2023-06-01 ENCOUNTER — OFFICE VISIT (OUTPATIENT)
Dept: PALLATIVE CARE | Age: 48
End: 2023-06-01
Payer: COMMERCIAL

## 2023-06-01 ENCOUNTER — OFFICE VISIT (OUTPATIENT)
Dept: ONCOLOGY | Age: 48
End: 2023-06-01
Payer: COMMERCIAL

## 2023-06-01 VITALS
TEMPERATURE: 98.1 F | HEART RATE: 117 BPM | WEIGHT: 128.2 LBS | SYSTOLIC BLOOD PRESSURE: 108 MMHG | HEIGHT: 64 IN | RESPIRATION RATE: 14 BRPM | DIASTOLIC BLOOD PRESSURE: 79 MMHG | OXYGEN SATURATION: 100 % | BODY MASS INDEX: 21.89 KG/M2

## 2023-06-01 DIAGNOSIS — C78.6 METASTASIS TO PERITONEUM OF UNKNOWN PRIMARY (HCC): Primary | ICD-10-CM

## 2023-06-01 DIAGNOSIS — C80.1 METASTASIS TO PERITONEUM OF UNKNOWN PRIMARY (HCC): Primary | ICD-10-CM

## 2023-06-01 DIAGNOSIS — C80.1 CARCINOMA OF UNKNOWN PRIMARY (HCC): ICD-10-CM

## 2023-06-01 DIAGNOSIS — Z00.8 NUTRITIONAL ASSESSMENT: Primary | ICD-10-CM

## 2023-06-01 DIAGNOSIS — T45.1X5A CHEMOTHERAPY-INDUCED NEUROPATHY (HCC): ICD-10-CM

## 2023-06-01 DIAGNOSIS — Z78.9 ON TOTAL PARENTERAL NUTRITION (TPN): ICD-10-CM

## 2023-06-01 DIAGNOSIS — Z51.5 ENCOUNTER FOR PALLIATIVE CARE: ICD-10-CM

## 2023-06-01 DIAGNOSIS — G62.0 CHEMOTHERAPY-INDUCED NEUROPATHY (HCC): ICD-10-CM

## 2023-06-01 DIAGNOSIS — R12 HEARTBURN: Primary | ICD-10-CM

## 2023-06-01 LAB
ALBUMIN SERPL-MCNC: 2.1 G/DL (ref 3.5–5)
ALBUMIN/GLOB SERPL: 0.5 (ref 0.4–1.6)
ALP SERPL-CCNC: 132 U/L (ref 50–136)
ALT SERPL-CCNC: 51 U/L (ref 12–65)
ANION GAP SERPL CALC-SCNC: 9 MMOL/L (ref 2–11)
AST SERPL-CCNC: 47 U/L (ref 15–37)
BASOPHILS # BLD: 0 K/UL (ref 0–0.2)
BASOPHILS NFR BLD: 0 % (ref 0–2)
BILIRUB SERPL-MCNC: 0.3 MG/DL (ref 0.2–1.1)
BUN SERPL-MCNC: 25 MG/DL (ref 6–23)
CALCIUM SERPL-MCNC: 8.8 MG/DL (ref 8.3–10.4)
CHLORIDE SERPL-SCNC: 105 MMOL/L (ref 101–110)
CO2 SERPL-SCNC: 28 MMOL/L (ref 21–32)
CREAT SERPL-MCNC: 0.4 MG/DL (ref 0.6–1)
DIFFERENTIAL METHOD BLD: ABNORMAL
EOSINOPHIL # BLD: 0 K/UL (ref 0–0.8)
EOSINOPHIL NFR BLD: 1 % (ref 0.5–7.8)
ERYTHROCYTE [DISTWIDTH] IN BLOOD BY AUTOMATED COUNT: 23.3 % (ref 11.9–14.6)
GLOBULIN SER CALC-MCNC: 4.2 G/DL (ref 2.8–4.5)
GLUCOSE SERPL-MCNC: 108 MG/DL (ref 65–100)
HCT VFR BLD AUTO: 26.9 % (ref 35.8–46.3)
HGB BLD-MCNC: 7.7 G/DL (ref 11.7–15.4)
IMM GRANULOCYTES # BLD AUTO: 0 K/UL (ref 0–0.5)
IMM GRANULOCYTES NFR BLD AUTO: 0 % (ref 0–5)
LYMPHOCYTES # BLD: 1.4 K/UL (ref 0.5–4.6)
LYMPHOCYTES NFR BLD: 16 % (ref 13–44)
MAGNESIUM SERPL-MCNC: 2.1 MG/DL (ref 1.8–2.4)
MCH RBC QN AUTO: 25.2 PG (ref 26.1–32.9)
MCHC RBC AUTO-ENTMCNC: 28.6 G/DL (ref 31.4–35)
MCV RBC AUTO: 88.2 FL (ref 82–102)
MONOCYTES # BLD: 1.1 K/UL (ref 0.1–1.3)
MONOCYTES NFR BLD: 13 % (ref 4–12)
NEUTS SEG # BLD: 6 K/UL (ref 1.7–8.2)
NEUTS SEG NFR BLD: 70 % (ref 43–78)
NRBC # BLD: 0 K/UL (ref 0–0.2)
PLATELET # BLD AUTO: 505 K/UL (ref 150–450)
PMV BLD AUTO: 10.6 FL (ref 9.4–12.3)
POTASSIUM SERPL-SCNC: 4.1 MMOL/L (ref 3.5–5.1)
PROT SERPL-MCNC: 6.3 G/DL (ref 6.3–8.2)
PROT UR-MCNC: 66 MG/DL
RBC # BLD AUTO: 3.05 M/UL (ref 4.05–5.2)
SODIUM SERPL-SCNC: 142 MMOL/L (ref 133–143)
WBC # BLD AUTO: 8.6 K/UL (ref 4.3–11.1)

## 2023-06-01 PROCEDURE — 99214 OFFICE O/P EST MOD 30 MIN: CPT | Performed by: NURSE PRACTITIONER

## 2023-06-01 PROCEDURE — 2580000003 HC RX 258: Performed by: INTERNAL MEDICINE

## 2023-06-01 PROCEDURE — 84156 ASSAY OF PROTEIN URINE: CPT

## 2023-06-01 PROCEDURE — 80053 COMPREHEN METABOLIC PANEL: CPT

## 2023-06-01 PROCEDURE — 96375 TX/PRO/DX INJ NEW DRUG ADDON: CPT

## 2023-06-01 PROCEDURE — A4216 STERILE WATER/SALINE, 10 ML: HCPCS | Performed by: INTERNAL MEDICINE

## 2023-06-01 PROCEDURE — 99215 OFFICE O/P EST HI 40 MIN: CPT | Performed by: INTERNAL MEDICINE

## 2023-06-01 PROCEDURE — 85025 COMPLETE CBC W/AUTO DIFF WBC: CPT

## 2023-06-01 PROCEDURE — 2500000003 HC RX 250 WO HCPCS: Performed by: INTERNAL MEDICINE

## 2023-06-01 PROCEDURE — 83735 ASSAY OF MAGNESIUM: CPT

## 2023-06-01 PROCEDURE — 36591 DRAW BLOOD OFF VENOUS DEVICE: CPT

## 2023-06-01 PROCEDURE — 6360000002 HC RX W HCPCS: Performed by: INTERNAL MEDICINE

## 2023-06-01 PROCEDURE — 96413 CHEMO IV INFUSION 1 HR: CPT

## 2023-06-01 PROCEDURE — 96417 CHEMO IV INFUS EACH ADDL SEQ: CPT

## 2023-06-01 RX ORDER — ALBUTEROL SULFATE 90 UG/1
4 AEROSOL, METERED RESPIRATORY (INHALATION) PRN
OUTPATIENT
Start: 2023-06-15

## 2023-06-01 RX ORDER — ACETAMINOPHEN 325 MG/1
650 TABLET ORAL
Status: CANCELLED | OUTPATIENT
Start: 2023-06-01

## 2023-06-01 RX ORDER — ALBUTEROL SULFATE 90 UG/1
4 AEROSOL, METERED RESPIRATORY (INHALATION) PRN
Status: DISCONTINUED | OUTPATIENT
Start: 2023-06-01 | End: 2023-06-02 | Stop reason: HOSPADM

## 2023-06-01 RX ORDER — DIPHENHYDRAMINE HYDROCHLORIDE 50 MG/ML
50 INJECTION INTRAMUSCULAR; INTRAVENOUS
OUTPATIENT
Start: 2023-06-08

## 2023-06-01 RX ORDER — SODIUM CHLORIDE 9 MG/ML
5-250 INJECTION, SOLUTION INTRAVENOUS PRN
Status: DISCONTINUED | OUTPATIENT
Start: 2023-06-01 | End: 2023-06-02 | Stop reason: HOSPADM

## 2023-06-01 RX ORDER — SODIUM CHLORIDE 9 MG/ML
INJECTION, SOLUTION INTRAVENOUS CONTINUOUS
Status: DISCONTINUED | OUTPATIENT
Start: 2023-06-01 | End: 2023-06-02 | Stop reason: HOSPADM

## 2023-06-01 RX ORDER — DEXAMETHASONE SODIUM PHOSPHATE 10 MG/ML
10 INJECTION INTRAMUSCULAR; INTRAVENOUS ONCE
Status: COMPLETED | OUTPATIENT
Start: 2023-06-01 | End: 2023-06-01

## 2023-06-01 RX ORDER — ALBUTEROL SULFATE 90 UG/1
4 AEROSOL, METERED RESPIRATORY (INHALATION) PRN
OUTPATIENT
Start: 2023-06-08

## 2023-06-01 RX ORDER — ONDANSETRON 2 MG/ML
8 INJECTION INTRAMUSCULAR; INTRAVENOUS ONCE
OUTPATIENT
Start: 2023-06-08 | End: 2023-06-08

## 2023-06-01 RX ORDER — SODIUM CHLORIDE 0.9 % (FLUSH) 0.9 %
5-40 SYRINGE (ML) INJECTION PRN
OUTPATIENT
Start: 2023-06-08

## 2023-06-01 RX ORDER — EPINEPHRINE 1 MG/ML
0.3 INJECTION, SOLUTION, CONCENTRATE INTRAVENOUS PRN
OUTPATIENT
Start: 2023-06-08

## 2023-06-01 RX ORDER — ONDANSETRON 2 MG/ML
8 INJECTION INTRAMUSCULAR; INTRAVENOUS ONCE
Status: COMPLETED | OUTPATIENT
Start: 2023-06-01 | End: 2023-06-01

## 2023-06-01 RX ORDER — MEPERIDINE HYDROCHLORIDE 50 MG/ML
12.5 INJECTION INTRAMUSCULAR; INTRAVENOUS; SUBCUTANEOUS PRN
OUTPATIENT
Start: 2023-06-08

## 2023-06-01 RX ORDER — SODIUM CHLORIDE 0.9 % (FLUSH) 0.9 %
5-40 SYRINGE (ML) INJECTION PRN
Status: CANCELLED | OUTPATIENT
Start: 2023-06-01

## 2023-06-01 RX ORDER — MEPERIDINE HYDROCHLORIDE 25 MG/ML
12.5 INJECTION INTRAMUSCULAR; INTRAVENOUS; SUBCUTANEOUS PRN
Status: DISCONTINUED | OUTPATIENT
Start: 2023-06-01 | End: 2023-06-02 | Stop reason: HOSPADM

## 2023-06-01 RX ORDER — SODIUM CHLORIDE 9 MG/ML
5-250 INJECTION, SOLUTION INTRAVENOUS PRN
OUTPATIENT
Start: 2023-06-08

## 2023-06-01 RX ORDER — SODIUM CHLORIDE 9 MG/ML
5-250 INJECTION, SOLUTION INTRAVENOUS PRN
Status: CANCELLED | OUTPATIENT
Start: 2023-06-01

## 2023-06-01 RX ORDER — DIPHENHYDRAMINE HYDROCHLORIDE 50 MG/ML
50 INJECTION INTRAMUSCULAR; INTRAVENOUS ONCE
OUTPATIENT
Start: 2023-06-15 | End: 2023-06-15

## 2023-06-01 RX ORDER — EPINEPHRINE 1 MG/ML
0.3 INJECTION, SOLUTION, CONCENTRATE INTRAVENOUS PRN
OUTPATIENT
Start: 2023-06-15

## 2023-06-01 RX ORDER — AZITHROMYCIN 200 MG/5ML
POWDER, FOR SUSPENSION ORAL
COMMUNITY
Start: 2023-05-25

## 2023-06-01 RX ORDER — FAMOTIDINE 10 MG/ML
20 INJECTION, SOLUTION INTRAVENOUS ONCE
Status: CANCELLED | OUTPATIENT
Start: 2023-06-01 | End: 2023-06-01

## 2023-06-01 RX ORDER — ACETAMINOPHEN 325 MG/1
650 TABLET ORAL
OUTPATIENT
Start: 2023-06-15

## 2023-06-01 RX ORDER — DIPHENHYDRAMINE HYDROCHLORIDE 50 MG/ML
50 INJECTION INTRAMUSCULAR; INTRAVENOUS ONCE
Status: CANCELLED | OUTPATIENT
Start: 2023-06-01 | End: 2023-06-01

## 2023-06-01 RX ORDER — SODIUM CHLORIDE 0.9 % (FLUSH) 0.9 %
5-40 SYRINGE (ML) INJECTION PRN
OUTPATIENT
Start: 2023-06-15

## 2023-06-01 RX ORDER — DIPHENHYDRAMINE HYDROCHLORIDE 50 MG/ML
50 INJECTION INTRAMUSCULAR; INTRAVENOUS
Status: CANCELLED | OUTPATIENT
Start: 2023-06-01

## 2023-06-01 RX ORDER — MEPERIDINE HYDROCHLORIDE 50 MG/ML
12.5 INJECTION INTRAMUSCULAR; INTRAVENOUS; SUBCUTANEOUS PRN
OUTPATIENT
Start: 2023-06-15

## 2023-06-01 RX ORDER — DIPHENHYDRAMINE HYDROCHLORIDE 50 MG/ML
50 INJECTION INTRAMUSCULAR; INTRAVENOUS ONCE
OUTPATIENT
Start: 2023-06-08 | End: 2023-06-08

## 2023-06-01 RX ORDER — DIPHENHYDRAMINE HYDROCHLORIDE 50 MG/ML
50 INJECTION INTRAMUSCULAR; INTRAVENOUS ONCE
Status: COMPLETED | OUTPATIENT
Start: 2023-06-01 | End: 2023-06-01

## 2023-06-01 RX ORDER — HEPARIN SODIUM (PORCINE) LOCK FLUSH IV SOLN 100 UNIT/ML 100 UNIT/ML
500 SOLUTION INTRAVENOUS PRN
OUTPATIENT
Start: 2023-06-08

## 2023-06-01 RX ORDER — ALBUTEROL SULFATE 90 UG/1
4 AEROSOL, METERED RESPIRATORY (INHALATION) PRN
Status: CANCELLED | OUTPATIENT
Start: 2023-06-01

## 2023-06-01 RX ORDER — HEPARIN SODIUM (PORCINE) LOCK FLUSH IV SOLN 100 UNIT/ML 100 UNIT/ML
500 SOLUTION INTRAVENOUS PRN
Status: CANCELLED | OUTPATIENT
Start: 2023-06-01

## 2023-06-01 RX ORDER — ACETAMINOPHEN 325 MG/1
650 TABLET ORAL
Status: DISCONTINUED | OUTPATIENT
Start: 2023-06-01 | End: 2023-06-02 | Stop reason: HOSPADM

## 2023-06-01 RX ORDER — SODIUM CHLORIDE 0.9 % (FLUSH) 0.9 %
5-40 SYRINGE (ML) INJECTION PRN
Status: DISCONTINUED | OUTPATIENT
Start: 2023-06-01 | End: 2023-06-02 | Stop reason: HOSPADM

## 2023-06-01 RX ORDER — ONDANSETRON 2 MG/ML
8 INJECTION INTRAMUSCULAR; INTRAVENOUS
OUTPATIENT
Start: 2023-06-08

## 2023-06-01 RX ORDER — ONDANSETRON 2 MG/ML
8 INJECTION INTRAMUSCULAR; INTRAVENOUS
OUTPATIENT
Start: 2023-06-15

## 2023-06-01 RX ORDER — ONDANSETRON 2 MG/ML
8 INJECTION INTRAMUSCULAR; INTRAVENOUS
Status: CANCELLED | OUTPATIENT
Start: 2023-06-01

## 2023-06-01 RX ORDER — ONDANSETRON 2 MG/ML
8 INJECTION INTRAMUSCULAR; INTRAVENOUS ONCE
Status: CANCELLED | OUTPATIENT
Start: 2023-06-01 | End: 2023-06-01

## 2023-06-01 RX ORDER — SODIUM CHLORIDE 9 MG/ML
INJECTION, SOLUTION INTRAVENOUS CONTINUOUS
Status: CANCELLED | OUTPATIENT
Start: 2023-06-01

## 2023-06-01 RX ORDER — FAMOTIDINE 10 MG/ML
20 INJECTION, SOLUTION INTRAVENOUS
OUTPATIENT
Start: 2023-06-15

## 2023-06-01 RX ORDER — FAMOTIDINE 10 MG/ML
20 INJECTION, SOLUTION INTRAVENOUS
OUTPATIENT
Start: 2023-06-08

## 2023-06-01 RX ORDER — ONDANSETRON 2 MG/ML
8 INJECTION INTRAMUSCULAR; INTRAVENOUS ONCE
OUTPATIENT
Start: 2023-06-15 | End: 2023-06-15

## 2023-06-01 RX ORDER — MEPERIDINE HYDROCHLORIDE 50 MG/ML
12.5 INJECTION INTRAMUSCULAR; INTRAVENOUS; SUBCUTANEOUS PRN
Status: CANCELLED | OUTPATIENT
Start: 2023-06-01

## 2023-06-01 RX ORDER — SODIUM CHLORIDE 9 MG/ML
INJECTION, SOLUTION INTRAVENOUS CONTINUOUS
OUTPATIENT
Start: 2023-06-08

## 2023-06-01 RX ORDER — FAMOTIDINE 10 MG/ML
20 INJECTION, SOLUTION INTRAVENOUS ONCE
OUTPATIENT
Start: 2023-06-08 | End: 2023-06-08

## 2023-06-01 RX ORDER — EPINEPHRINE 1 MG/ML
0.3 INJECTION, SOLUTION, CONCENTRATE INTRAVENOUS PRN
Status: DISCONTINUED | OUTPATIENT
Start: 2023-06-01 | End: 2023-06-02 | Stop reason: HOSPADM

## 2023-06-01 RX ORDER — EPINEPHRINE 1 MG/ML
0.3 INJECTION, SOLUTION, CONCENTRATE INTRAVENOUS PRN
Status: CANCELLED | OUTPATIENT
Start: 2023-06-01

## 2023-06-01 RX ORDER — FAMOTIDINE 10 MG/ML
20 INJECTION, SOLUTION INTRAVENOUS
Status: CANCELLED | OUTPATIENT
Start: 2023-06-01

## 2023-06-01 RX ORDER — ACETAMINOPHEN 325 MG/1
650 TABLET ORAL
OUTPATIENT
Start: 2023-06-08

## 2023-06-01 RX ORDER — FAMOTIDINE 10 MG/ML
20 INJECTION, SOLUTION INTRAVENOUS ONCE
OUTPATIENT
Start: 2023-06-15 | End: 2023-06-15

## 2023-06-01 RX ORDER — HEPARIN SODIUM (PORCINE) LOCK FLUSH IV SOLN 100 UNIT/ML 100 UNIT/ML
500 SOLUTION INTRAVENOUS PRN
OUTPATIENT
Start: 2023-06-15

## 2023-06-01 RX ORDER — DIPHENHYDRAMINE HYDROCHLORIDE 50 MG/ML
50 INJECTION INTRAMUSCULAR; INTRAVENOUS
Status: DISCONTINUED | OUTPATIENT
Start: 2023-06-01 | End: 2023-06-02 | Stop reason: HOSPADM

## 2023-06-01 RX ORDER — ONDANSETRON 2 MG/ML
8 INJECTION INTRAMUSCULAR; INTRAVENOUS
Status: DISCONTINUED | OUTPATIENT
Start: 2023-06-01 | End: 2023-06-02 | Stop reason: HOSPADM

## 2023-06-01 RX ORDER — SODIUM CHLORIDE 9 MG/ML
5-250 INJECTION, SOLUTION INTRAVENOUS PRN
OUTPATIENT
Start: 2023-06-15

## 2023-06-01 RX ORDER — DIPHENHYDRAMINE HYDROCHLORIDE 50 MG/ML
50 INJECTION INTRAMUSCULAR; INTRAVENOUS
OUTPATIENT
Start: 2023-06-15

## 2023-06-01 RX ORDER — SODIUM CHLORIDE 9 MG/ML
INJECTION, SOLUTION INTRAVENOUS CONTINUOUS
OUTPATIENT
Start: 2023-06-15

## 2023-06-01 RX ADMIN — FAMOTIDINE 20 MG: 10 INJECTION, SOLUTION INTRAVENOUS at 12:58

## 2023-06-01 RX ADMIN — DIPHENHYDRAMINE HYDROCHLORIDE 50 MG: 50 INJECTION, SOLUTION INTRAMUSCULAR; INTRAVENOUS at 11:04

## 2023-06-01 RX ADMIN — SODIUM CHLORIDE 400 MG: 9 INJECTION, SOLUTION INTRAVENOUS at 11:45

## 2023-06-01 RX ADMIN — PACLITAXEL 108 MG: 6 INJECTION, SOLUTION, CONCENTRATE INTRAVENOUS at 13:08

## 2023-06-01 RX ADMIN — ONDANSETRON 8 MG: 2 INJECTION INTRAMUSCULAR; INTRAVENOUS at 12:56

## 2023-06-01 RX ADMIN — SODIUM CHLORIDE 100 ML/HR: 9 INJECTION, SOLUTION INTRAVENOUS at 10:40

## 2023-06-01 RX ADMIN — DEXAMETHASONE SODIUM PHOSPHATE 10 MG: 10 INJECTION INTRAMUSCULAR; INTRAVENOUS at 12:54

## 2023-06-01 RX ADMIN — SODIUM CHLORIDE, PRESERVATIVE FREE 10 ML: 5 INJECTION INTRAVENOUS at 10:41

## 2023-06-01 RX ADMIN — SODIUM CHLORIDE, PRESERVATIVE FREE 10 ML: 5 INJECTION INTRAVENOUS at 09:24

## 2023-06-01 ASSESSMENT — PATIENT HEALTH QUESTIONNAIRE - PHQ9
SUM OF ALL RESPONSES TO PHQ QUESTIONS 1-9: 0
1. LITTLE INTEREST OR PLEASURE IN DOING THINGS: 0
SUM OF ALL RESPONSES TO PHQ QUESTIONS 1-9: 0
2. FEELING DOWN, DEPRESSED OR HOPELESS: 0
SUM OF ALL RESPONSES TO PHQ9 QUESTIONS 1 & 2: 0
SUM OF ALL RESPONSES TO PHQ QUESTIONS 1-9: 0
SUM OF ALL RESPONSES TO PHQ QUESTIONS 1-9: 0

## 2023-06-01 ASSESSMENT — ENCOUNTER SYMPTOMS
NAUSEA: 0
ABDOMINAL PAIN: 0

## 2023-06-01 NOTE — PROGRESS NOTES
150 - 450 K/uL    MPV 10.6 9.4 - 12.3 FL    nRBC 0.00 0.0 - 0.2 K/uL    Differential Type AUTOMATED      Neutrophils % 70 43 - 78 %    Lymphocytes % 16 13 - 44 %    Monocytes % 13 (H) 4.0 - 12.0 %    Eosinophils % 1 0.5 - 7.8 %    Basophils % 0 0.0 - 2.0 %    Immature Granulocytes 0 0.0 - 5.0 %    Neutrophils Absolute 6.0 1.7 - 8.2 K/UL    Lymphocytes Absolute 1.4 0.5 - 4.6 K/UL    Monocytes Absolute 1.1 0.1 - 1.3 K/UL    Eosinophils Absolute 0.0 0.0 - 0.8 K/UL    Basophils Absolute 0.0 0.0 - 0.2 K/UL    Absolute Immature Granulocyte 0.0 0.0 - 0.5 K/UL   Comprehensive Metabolic Panel    Collection Time: 06/01/23  9:11 AM   Result Value Ref Range    Sodium 142 133 - 143 mmol/L    Potassium 4.1 3.5 - 5.1 mmol/L    Chloride 105 101 - 110 mmol/L    CO2 28 21 - 32 mmol/L    Anion Gap 9 2 - 11 mmol/L    Glucose 108 (H) 65 - 100 mg/dL    BUN 25 (H) 6 - 23 MG/DL    Creatinine 0.40 (L) 0.6 - 1.0 MG/DL    Est, Glom Filt Rate >60 >60 ml/min/1.73m2    Calcium 8.8 8.3 - 10.4 MG/DL    Total Bilirubin 0.3 0.2 - 1.1 MG/DL    ALT 51 12 - 65 U/L    AST 47 (H) 15 - 37 U/L    Alk Phosphatase 132 50 - 136 U/L    Total Protein 6.3 6.3 - 8.2 g/dL    Albumin 2.1 (L) 3.5 - 5.0 g/dL    Globulin 4.2 2.8 - 4.5 g/dL    Albumin/Globulin Ratio 0.5 0.4 - 1.6     Magnesium    Collection Time: 06/01/23  9:11 AM   Result Value Ref Range    Magnesium 2.1 1.8 - 2.4 mg/dL   Protein, urine, random    Collection Time: 06/01/23  9:11 AM   Result Value Ref Range    Protein, Urine, Random 66 (H) <11.9 mg/dL         Imaging:  No results found for this or any previous visit. ASSESSMENT:   Diagnosis Orders   1. Heartburn        2. Chemotherapy-induced neuropathy ([facility] [address]        3. Carcinoma of unknown primary ([facility] [address]        4. Encounter for palliative care                PLAN:  Lab studies and imaging studies were personally reviewed.     Pertinent old records were reviewed from previous [facility] encounters     Heartburn:  She is on azithromycin as motility agent per

## 2023-06-01 NOTE — PROGRESS NOTES
Patient arrived to port lab for port access and lab draw   Im [place] 45 accessed and labs drawn per protocol   *Port remains accessed. Patient discharged from port lab via wheelchair.

## 2023-06-01 NOTE — PATIENT INSTRUCTIONS
Patient Instructions from Today's Visit    Reason for Visit:  Follow up-Carcinoma    Diagnosis Information:  https://www.[employer]/. net/about-us/asco-answers-patient-education-materials/vxcr-ludeymh-maat-sheets      Plan: Your CT scan didn't show anything new, but it did show those pockets of fluid. This maybe contributing to the pressure on your stomach. We would like to continue the treatment being that it's keeping things stable. Proceed to infusion.   Follow Up:  NP on 6/29  Recent Lab Results:  Hospital Outpatient Visit on 06/01/2023   Component Date Value Ref Range Status    WBC 06/01/2023 8.6  4.3 - 11.1 K/uL Final    RBC 06/01/2023 3.05 (L)  4.05 - 5.2 M/uL Final    Hemoglobin 06/01/2023 7.7 (L)  11.7 - 15.4 g/dL Final    Hematocrit 06/01/2023 26.9 (L)  35.8 - 46.3 % Final    MCV 06/01/2023 88.2  82.0 - 102.0 FL Final    MCH 06/01/2023 25.2 (L)  26.1 - 32.9 PG Final    MCHC 06/01/2023 28.6 (L)  31.4 - 35.0 g/dL Final    RDW 06/01/2023 23.3 (H)  11.9 - 14.6 % Final    Platelets 61/73/5246 505 (H)  150 - 450 K/uL Final    MPV 06/01/2023 10.6  9.4 - 12.3 FL Final    nRBC 06/01/2023 0.00  0.0 - 0.2 K/uL Final    **Note: Absolute NRBC parameter is now reported with Hemogram**    Differential Type 06/01/2023 AUTOMATED    Final    Neutrophils % 06/01/2023 70  43 - 78 % Final    Lymphocytes % 06/01/2023 16  13 - 44 % Final    Monocytes % 06/01/2023 13 (H)  4.0 - 12.0 % Final    Eosinophils % 06/01/2023 1  0.5 - 7.8 % Final    Basophils % 06/01/2023 0  0.0 - 2.0 % Final    Immature Granulocytes 06/01/2023 0  0.0 - 5.0 % Final    Neutrophils Absolute 06/01/2023 6.0  1.7 - 8.2 K/UL Final    Lymphocytes Absolute 06/01/2023 1.4  0.5 - 4.6 K/UL Final    Monocytes Absolute 06/01/2023 1.1  0.1 - 1.3 K/UL Final    Eosinophils Absolute 06/01/2023 0.0  0.0 - 0.8 K/UL Final    Basophils Absolute 06/01/2023 0.0  0.0 - 0.2 K/UL Final    Absolute Immature Granulocyte 06/01/2023 0.0  0.0 - 0.5 K/UL Final         Treatment Summary

## 2023-06-01 NOTE — PROGRESS NOTES
Nutrition F/U:  Assessment:  Pt seen during office visit w/ Dr. Shanthi Chi, CT scan showing stable peritoneal carcinomatosis w/ loculated ascites - referral to IR to attempt improved peritoneal drainage, and referral back to GI - has appointment in July, but will request to be seen sooner. Pt remains TPN dependent for 100% of her estimated nutrition needs, infusing 12 hrs/day and managed by [facility]. Pt c/o severe reflux and intermittent N/V - has Protonix, Carafate, and promotility agents, but pt only able to consume some daily given reflux, Gaviscon is helpful for short term relief, but pt c/o persistent reflux constantly. RD to send email to [facility] RD to request increase in Famotidine in pt's TPN. Labs notable for BUN (25), AST (47), Hgb (7.7) - trending down. Current BW: 128#, up 2# over the past 2 weeks. Intervention:  1. Continue w/ liquids as tolerated for pleasure   2. [facility] requesting increase in Famotidine in pt's TPN. Continue w/ oral protonix, and Carafate QID as prescribed, Gaviscon prn. Monitoring/Evaluation:  1. RD to follow up during next office visit - follow up wt status, tolerance/intake of po diet, symptom management, nutrition related lab values w/ TPN.        [address] [facility], [address], [address]

## 2023-06-01 NOTE — PROGRESS NOTES
Pt arrived via wheelchair, cyramza and taxol completed, pt tolerated well, discharged home via wheelchair,

## 2023-06-02 ENCOUNTER — TELEPHONE (OUTPATIENT)
Dept: INTERVENTIONAL RADIOLOGY/VASCULAR | Age: 48
End: 2023-06-02

## 2023-06-02 NOTE — TELEPHONE ENCOUNTER
[] Phone call to: Patient    [] Number used to reach this person: [phone]    [] Voicemail reached: N/A     [] Appointment date:  6/12    [] Arrival time:  7412    [] Location given: yes    [] AM Medicine with a small sip of water: Yes    [] Patient is NPO: Yes    [] Need for : Yes    [] Anesthetic Moderate Sedation    [] Blood thinners held: No    [] Education on Hold requirements prior to procedure: na     [] Allergies: OTHER:      [] Reviewed    [] Latex Allergy: No    [] Lidocaine Allergy: No    [] CPAP at night: No    [] Any recent infections: No    [] Diabetes: No    [] Additional information:        [] Time taken to answer all questions    [] Call back phone number of [phone] given

## 2023-06-08 ENCOUNTER — HOSPITAL ENCOUNTER (OUTPATIENT)
Dept: INFUSION THERAPY | Age: 48
Discharge: HOME | End: 2023-06-08
Payer: COMMERCIAL

## 2023-06-08 VITALS
RESPIRATION RATE: 16 BRPM | SYSTOLIC BLOOD PRESSURE: 115 MMHG | OXYGEN SATURATION: 97 % | HEART RATE: 116 BPM | BODY MASS INDEX: 22.21 KG/M2 | DIASTOLIC BLOOD PRESSURE: 82 MMHG | TEMPERATURE: 99 F | WEIGHT: 129.4 LBS

## 2023-06-08 DIAGNOSIS — C80.1 METASTASIS TO PERITONEUM OF UNKNOWN PRIMARY (HCC): ICD-10-CM

## 2023-06-08 DIAGNOSIS — C78.6 METASTASIS TO PERITONEUM OF UNKNOWN PRIMARY (HCC): ICD-10-CM

## 2023-06-08 DIAGNOSIS — C80.1 CARCINOMA OF UNKNOWN PRIMARY (HCC): Primary | ICD-10-CM

## 2023-06-08 LAB
ALBUMIN SERPL-MCNC: 2.2 G/DL (ref 3.5–5)
ALBUMIN/GLOB SERPL: 0.5 (ref 0.4–1.6)
ALP SERPL-CCNC: 153 U/L (ref 50–136)
ALT SERPL-CCNC: 54 U/L (ref 12–65)
ANION GAP SERPL CALC-SCNC: 5 MMOL/L (ref 2–11)
AST SERPL-CCNC: 50 U/L (ref 15–37)
BASOPHILS # BLD: 0 K/UL (ref 0–0.2)
BASOPHILS NFR BLD: 0 % (ref 0–2)
BILIRUB SERPL-MCNC: 0.4 MG/DL (ref 0.2–1.1)
BUN SERPL-MCNC: 24 MG/DL (ref 6–23)
CALCIUM SERPL-MCNC: 8.8 MG/DL (ref 8.3–10.4)
CHLORIDE SERPL-SCNC: 107 MMOL/L (ref 101–110)
CO2 SERPL-SCNC: 27 MMOL/L (ref 21–32)
CREAT SERPL-MCNC: 0.5 MG/DL (ref 0.6–1)
DIFFERENTIAL METHOD BLD: ABNORMAL
EOSINOPHIL # BLD: 0.1 K/UL (ref 0–0.8)
EOSINOPHIL NFR BLD: 1 % (ref 0.5–7.8)
ERYTHROCYTE [DISTWIDTH] IN BLOOD BY AUTOMATED COUNT: 23.2 % (ref 11.9–14.6)
GLOBULIN SER CALC-MCNC: 4.3 G/DL (ref 2.8–4.5)
GLUCOSE SERPL-MCNC: 109 MG/DL (ref 65–100)
HCT VFR BLD AUTO: 26 % (ref 35.8–46.3)
HGB BLD-MCNC: 7.5 G/DL (ref 11.7–15.4)
IMM GRANULOCYTES # BLD AUTO: 0.1 K/UL (ref 0–0.5)
IMM GRANULOCYTES NFR BLD AUTO: 2 % (ref 0–5)
LYMPHOCYTES # BLD: 1.4 K/UL (ref 0.5–4.6)
LYMPHOCYTES NFR BLD: 20 % (ref 13–44)
MCH RBC QN AUTO: 25 PG (ref 26.1–32.9)
MCHC RBC AUTO-ENTMCNC: 28.8 G/DL (ref 31.4–35)
MCV RBC AUTO: 86.7 FL (ref 82–102)
MONOCYTES # BLD: 0.4 K/UL (ref 0.1–1.3)
MONOCYTES NFR BLD: 6 % (ref 4–12)
NEUTS SEG # BLD: 4.9 K/UL (ref 1.7–8.2)
NEUTS SEG NFR BLD: 70 % (ref 43–78)
NRBC # BLD: 0.19 K/UL (ref 0–0.2)
PLATELET # BLD AUTO: 442 K/UL (ref 150–450)
PMV BLD AUTO: 10.5 FL (ref 9.4–12.3)
POTASSIUM SERPL-SCNC: 4 MMOL/L (ref 3.5–5.1)
PROT SERPL-MCNC: 6.5 G/DL (ref 6.3–8.2)
RBC # BLD AUTO: 3 M/UL (ref 4.05–5.2)
SODIUM SERPL-SCNC: 139 MMOL/L (ref 133–143)
WBC # BLD AUTO: 7 K/UL (ref 4.3–11.1)

## 2023-06-08 PROCEDURE — A4216 STERILE WATER/SALINE, 10 ML: HCPCS | Performed by: INTERNAL MEDICINE

## 2023-06-08 PROCEDURE — 85025 COMPLETE CBC W/AUTO DIFF WBC: CPT

## 2023-06-08 PROCEDURE — 96413 CHEMO IV INFUSION 1 HR: CPT

## 2023-06-08 PROCEDURE — 2500000003 HC RX 250 WO HCPCS: Performed by: INTERNAL MEDICINE

## 2023-06-08 PROCEDURE — 80053 COMPREHEN METABOLIC PANEL: CPT

## 2023-06-08 PROCEDURE — 6360000002 HC RX W HCPCS: Performed by: INTERNAL MEDICINE

## 2023-06-08 PROCEDURE — 2580000003 HC RX 258: Performed by: INTERNAL MEDICINE

## 2023-06-08 PROCEDURE — 96375 TX/PRO/DX INJ NEW DRUG ADDON: CPT

## 2023-06-08 RX ORDER — DIPHENHYDRAMINE HYDROCHLORIDE 50 MG/ML
50 INJECTION INTRAMUSCULAR; INTRAVENOUS
Status: DISCONTINUED | OUTPATIENT
Start: 2023-06-08 | End: 2023-06-09 | Stop reason: HOSPADM

## 2023-06-08 RX ORDER — EPINEPHRINE 1 MG/ML
0.3 INJECTION, SOLUTION, CONCENTRATE INTRAVENOUS PRN
Status: DISCONTINUED | OUTPATIENT
Start: 2023-06-08 | End: 2023-06-09 | Stop reason: HOSPADM

## 2023-06-08 RX ORDER — ONDANSETRON 2 MG/ML
8 INJECTION INTRAMUSCULAR; INTRAVENOUS
Status: DISCONTINUED | OUTPATIENT
Start: 2023-06-08 | End: 2023-06-09 | Stop reason: HOSPADM

## 2023-06-08 RX ORDER — SODIUM CHLORIDE 9 MG/ML
5-250 INJECTION, SOLUTION INTRAVENOUS PRN
Status: DISCONTINUED | OUTPATIENT
Start: 2023-06-08 | End: 2023-06-09 | Stop reason: HOSPADM

## 2023-06-08 RX ORDER — ACETAMINOPHEN 325 MG/1
650 TABLET ORAL
Status: DISCONTINUED | OUTPATIENT
Start: 2023-06-08 | End: 2023-06-09 | Stop reason: HOSPADM

## 2023-06-08 RX ORDER — SODIUM CHLORIDE 9 MG/ML
INJECTION, SOLUTION INTRAVENOUS CONTINUOUS
Status: DISCONTINUED | OUTPATIENT
Start: 2023-06-08 | End: 2023-06-09 | Stop reason: HOSPADM

## 2023-06-08 RX ORDER — DEXAMETHASONE SODIUM PHOSPHATE 10 MG/ML
10 INJECTION INTRAMUSCULAR; INTRAVENOUS ONCE
Status: COMPLETED | OUTPATIENT
Start: 2023-06-08 | End: 2023-06-08

## 2023-06-08 RX ORDER — ONDANSETRON 2 MG/ML
8 INJECTION INTRAMUSCULAR; INTRAVENOUS ONCE
Status: COMPLETED | OUTPATIENT
Start: 2023-06-08 | End: 2023-06-08

## 2023-06-08 RX ORDER — DIPHENHYDRAMINE HYDROCHLORIDE 50 MG/ML
50 INJECTION INTRAMUSCULAR; INTRAVENOUS ONCE
Status: COMPLETED | OUTPATIENT
Start: 2023-06-08 | End: 2023-06-08

## 2023-06-08 RX ORDER — MEPERIDINE HYDROCHLORIDE 25 MG/ML
12.5 INJECTION INTRAMUSCULAR; INTRAVENOUS; SUBCUTANEOUS PRN
Status: DISCONTINUED | OUTPATIENT
Start: 2023-06-08 | End: 2023-06-09 | Stop reason: HOSPADM

## 2023-06-08 RX ORDER — ALBUTEROL SULFATE 90 UG/1
4 AEROSOL, METERED RESPIRATORY (INHALATION) PRN
Status: DISCONTINUED | OUTPATIENT
Start: 2023-06-08 | End: 2023-06-09 | Stop reason: HOSPADM

## 2023-06-08 RX ORDER — SODIUM CHLORIDE 0.9 % (FLUSH) 0.9 %
5-40 SYRINGE (ML) INJECTION PRN
Status: DISCONTINUED | OUTPATIENT
Start: 2023-06-08 | End: 2023-06-09 | Stop reason: HOSPADM

## 2023-06-08 RX ADMIN — PACLITAXEL 108 MG: 6 INJECTION, SOLUTION INTRAVENOUS at 13:13

## 2023-06-08 RX ADMIN — FAMOTIDINE 20 MG: 10 INJECTION, SOLUTION INTRAVENOUS at 12:26

## 2023-06-08 RX ADMIN — DIPHENHYDRAMINE HYDROCHLORIDE 50 MG: 50 INJECTION, SOLUTION INTRAMUSCULAR; INTRAVENOUS at 12:28

## 2023-06-08 RX ADMIN — DEXAMETHASONE SODIUM PHOSPHATE 10 MG: 10 INJECTION INTRAMUSCULAR; INTRAVENOUS at 12:30

## 2023-06-08 RX ADMIN — ONDANSETRON 8 MG: 2 INJECTION INTRAMUSCULAR; INTRAVENOUS at 12:24

## 2023-06-08 RX ADMIN — SODIUM CHLORIDE 50 ML/HR: 9 INJECTION, SOLUTION INTRAVENOUS at 12:15

## 2023-06-08 RX ADMIN — SODIUM CHLORIDE, PRESERVATIVE FREE 10 ML: 5 INJECTION INTRAVENOUS at 12:15

## 2023-06-08 NOTE — PROGRESS NOTES
Arrived to the [facility]. Taxol infusion and dressing/needle change completed. Patient tolerated well. Any issues or concerns during appointment: no  Patient aware of next infusion appointment on 6/15/2023 (date) at 56 (time). Patient aware of next lab and [facility] office visit on 6/19/2023 (date) at [phone] (time). Patient instructed to call provider with temperature of 100.4 or greater or nausea/vomiting/ diarrhea or pain not controlled by medications  Discharged ambulatory.

## 2023-06-14 DIAGNOSIS — C78.6 METASTASIS TO PERITONEUM OF UNKNOWN PRIMARY (HCC): ICD-10-CM

## 2023-06-14 DIAGNOSIS — R53.83 FATIGUE DUE TO TREATMENT: Primary | ICD-10-CM

## 2023-06-14 DIAGNOSIS — T45.1X5A CHEMOTHERAPY-INDUCED NEUROPATHY (HCC): ICD-10-CM

## 2023-06-14 DIAGNOSIS — R53.81 PHYSICAL DECONDITIONING: ICD-10-CM

## 2023-06-14 DIAGNOSIS — C80.1 CARCINOMA OF UNKNOWN PRIMARY (HCC): ICD-10-CM

## 2023-06-14 DIAGNOSIS — G62.0 CHEMOTHERAPY-INDUCED NEUROPATHY (HCC): ICD-10-CM

## 2023-06-14 DIAGNOSIS — C80.1 METASTASIS TO PERITONEUM OF UNKNOWN PRIMARY (HCC): ICD-10-CM

## 2023-06-15 ENCOUNTER — HOSPITAL ENCOUNTER (OUTPATIENT)
Dept: INFUSION THERAPY | Age: 48
Discharge: HOME | End: 2023-06-15
Payer: COMMERCIAL

## 2023-06-15 VITALS
RESPIRATION RATE: 16 BRPM | WEIGHT: 127 LBS | TEMPERATURE: 98.2 F | OXYGEN SATURATION: 99 % | BODY MASS INDEX: 21.8 KG/M2 | DIASTOLIC BLOOD PRESSURE: 76 MMHG | HEART RATE: 95 BPM | SYSTOLIC BLOOD PRESSURE: 101 MMHG

## 2023-06-15 DIAGNOSIS — C78.6 METASTASIS TO PERITONEUM OF UNKNOWN PRIMARY (HCC): ICD-10-CM

## 2023-06-15 DIAGNOSIS — C80.1 CARCINOMA OF UNKNOWN PRIMARY (HCC): Primary | ICD-10-CM

## 2023-06-15 DIAGNOSIS — C80.1 METASTASIS TO PERITONEUM OF UNKNOWN PRIMARY (HCC): ICD-10-CM

## 2023-06-15 LAB
ALBUMIN SERPL-MCNC: 2.2 G/DL (ref 3.5–5)
ALBUMIN/GLOB SERPL: 0.5 (ref 0.4–1.6)
ALP SERPL-CCNC: 158 U/L (ref 50–136)
ALT SERPL-CCNC: 45 U/L (ref 12–65)
ANION GAP SERPL CALC-SCNC: 7 MMOL/L (ref 2–11)
APPEARANCE UR: ABNORMAL
AST SERPL-CCNC: 39 U/L (ref 15–37)
BACTERIA URNS QL MICRO: NORMAL /HPF
BASOPHILS # BLD: 0 K/UL (ref 0–0.2)
BASOPHILS NFR BLD: 1 % (ref 0–2)
BILIRUB SERPL-MCNC: 0.4 MG/DL (ref 0.2–1.1)
BILIRUB UR QL: NEGATIVE
BUN SERPL-MCNC: 25 MG/DL (ref 6–23)
CALCIUM SERPL-MCNC: 8.7 MG/DL (ref 8.3–10.4)
CASTS URNS QL MICRO: NORMAL /LPF
CHLORIDE SERPL-SCNC: 106 MMOL/L (ref 101–110)
CO2 SERPL-SCNC: 27 MMOL/L (ref 21–32)
COLOR UR: ABNORMAL
CREAT SERPL-MCNC: 0.5 MG/DL (ref 0.6–1)
CRYSTALS URNS QL MICRO: 0 /LPF
DIFFERENTIAL METHOD BLD: ABNORMAL
EOSINOPHIL # BLD: 0.1 K/UL (ref 0–0.8)
EOSINOPHIL NFR BLD: 1 % (ref 0.5–7.8)
EPI CELLS #/AREA URNS HPF: NORMAL /HPF
ERYTHROCYTE [DISTWIDTH] IN BLOOD BY AUTOMATED COUNT: 23.5 % (ref 11.9–14.6)
GLOBULIN SER CALC-MCNC: 4.5 G/DL (ref 2.8–4.5)
GLUCOSE SERPL-MCNC: 131 MG/DL (ref 65–100)
GLUCOSE UR STRIP.AUTO-MCNC: NEGATIVE MG/DL
HCT VFR BLD AUTO: 26.7 % (ref 35.8–46.3)
HGB BLD-MCNC: 7.6 G/DL (ref 11.7–15.4)
HGB UR QL STRIP: ABNORMAL
IMM GRANULOCYTES # BLD AUTO: 0 K/UL (ref 0–0.5)
IMM GRANULOCYTES NFR BLD AUTO: 0 % (ref 0–5)
KETONES UR QL STRIP.AUTO: NEGATIVE MG/DL
LEUKOCYTE ESTERASE UR QL STRIP.AUTO: ABNORMAL
LYMPHOCYTES # BLD: 1.5 K/UL (ref 0.5–4.6)
LYMPHOCYTES NFR BLD: 31 % (ref 13–44)
MCH RBC QN AUTO: 24.6 PG (ref 26.1–32.9)
MCHC RBC AUTO-ENTMCNC: 28.5 G/DL (ref 31.4–35)
MCV RBC AUTO: 86.4 FL (ref 82–102)
MONOCYTES # BLD: 0.4 K/UL (ref 0.1–1.3)
MONOCYTES NFR BLD: 9 % (ref 4–12)
MUCOUS THREADS URNS QL MICRO: 0 /LPF
NEUTS SEG # BLD: 2.8 K/UL (ref 1.7–8.2)
NEUTS SEG NFR BLD: 58 % (ref 43–78)
NITRITE UR QL STRIP.AUTO: NEGATIVE
NRBC # BLD: 0.14 K/UL (ref 0–0.2)
PH UR STRIP: 6.5 (ref 5–9)
PLATELET # BLD AUTO: 404 K/UL (ref 150–450)
PMV BLD AUTO: 10.3 FL (ref 9.4–12.3)
POTASSIUM SERPL-SCNC: 4.2 MMOL/L (ref 3.5–5.1)
PROT SERPL-MCNC: 6.7 G/DL (ref 6.3–8.2)
PROT UR STRIP-MCNC: >300 MG/DL
PROT UR-MCNC: 148 MG/DL
RBC # BLD AUTO: 3.09 M/UL (ref 4.05–5.2)
RBC #/AREA URNS HPF: >100 /HPF
SODIUM SERPL-SCNC: 140 MMOL/L (ref 133–143)
SP GR UR REFRACTOMETRY: 1.02 (ref 1–1.02)
UROBILINOGEN UR QL STRIP.AUTO: 1 EU/DL
WBC # BLD AUTO: 4.9 K/UL (ref 4.3–11.1)
WBC URNS QL MICRO: NORMAL /HPF
YEAST URNS QL MICRO: NORMAL

## 2023-06-15 PROCEDURE — 6360000002 HC RX W HCPCS: Performed by: INTERNAL MEDICINE

## 2023-06-15 PROCEDURE — 2580000003 HC RX 258: Performed by: INTERNAL MEDICINE

## 2023-06-15 PROCEDURE — 85025 COMPLETE CBC W/AUTO DIFF WBC: CPT

## 2023-06-15 PROCEDURE — 84156 ASSAY OF PROTEIN URINE: CPT

## 2023-06-15 PROCEDURE — 2500000003 HC RX 250 WO HCPCS: Performed by: INTERNAL MEDICINE

## 2023-06-15 PROCEDURE — A4216 STERILE WATER/SALINE, 10 ML: HCPCS | Performed by: INTERNAL MEDICINE

## 2023-06-15 PROCEDURE — 96413 CHEMO IV INFUSION 1 HR: CPT

## 2023-06-15 PROCEDURE — 81001 URINALYSIS AUTO W/SCOPE: CPT

## 2023-06-15 PROCEDURE — 96375 TX/PRO/DX INJ NEW DRUG ADDON: CPT

## 2023-06-15 PROCEDURE — 80053 COMPREHEN METABOLIC PANEL: CPT

## 2023-06-15 RX ORDER — EPINEPHRINE 1 MG/ML
0.3 INJECTION, SOLUTION, CONCENTRATE INTRAVENOUS PRN
Status: DISCONTINUED | OUTPATIENT
Start: 2023-06-15 | End: 2023-06-16 | Stop reason: HOSPADM

## 2023-06-15 RX ORDER — SODIUM CHLORIDE 9 MG/ML
INJECTION, SOLUTION INTRAVENOUS CONTINUOUS
Status: DISCONTINUED | OUTPATIENT
Start: 2023-06-15 | End: 2023-06-16 | Stop reason: HOSPADM

## 2023-06-15 RX ORDER — DEXAMETHASONE SODIUM PHOSPHATE 10 MG/ML
10 INJECTION INTRAMUSCULAR; INTRAVENOUS ONCE
Status: COMPLETED | OUTPATIENT
Start: 2023-06-15 | End: 2023-06-15

## 2023-06-15 RX ORDER — DIPHENHYDRAMINE HYDROCHLORIDE 50 MG/ML
50 INJECTION INTRAMUSCULAR; INTRAVENOUS
Status: DISCONTINUED | OUTPATIENT
Start: 2023-06-15 | End: 2023-06-16 | Stop reason: HOSPADM

## 2023-06-15 RX ORDER — DIPHENHYDRAMINE HYDROCHLORIDE 50 MG/ML
50 INJECTION INTRAMUSCULAR; INTRAVENOUS ONCE
Status: COMPLETED | OUTPATIENT
Start: 2023-06-15 | End: 2023-06-15

## 2023-06-15 RX ORDER — SODIUM CHLORIDE 9 MG/ML
5-250 INJECTION, SOLUTION INTRAVENOUS PRN
Status: DISCONTINUED | OUTPATIENT
Start: 2023-06-15 | End: 2023-06-16 | Stop reason: HOSPADM

## 2023-06-15 RX ORDER — ONDANSETRON 2 MG/ML
8 INJECTION INTRAMUSCULAR; INTRAVENOUS
Status: DISCONTINUED | OUTPATIENT
Start: 2023-06-15 | End: 2023-06-16 | Stop reason: HOSPADM

## 2023-06-15 RX ORDER — ONDANSETRON 2 MG/ML
8 INJECTION INTRAMUSCULAR; INTRAVENOUS ONCE
Status: COMPLETED | OUTPATIENT
Start: 2023-06-15 | End: 2023-06-15

## 2023-06-15 RX ORDER — SODIUM CHLORIDE 0.9 % (FLUSH) 0.9 %
5-40 SYRINGE (ML) INJECTION PRN
Status: DISCONTINUED | OUTPATIENT
Start: 2023-06-15 | End: 2023-06-16 | Stop reason: HOSPADM

## 2023-06-15 RX ORDER — ALBUTEROL SULFATE 90 UG/1
4 AEROSOL, METERED RESPIRATORY (INHALATION) PRN
Status: DISCONTINUED | OUTPATIENT
Start: 2023-06-15 | End: 2023-06-16 | Stop reason: HOSPADM

## 2023-06-15 RX ORDER — MEPERIDINE HYDROCHLORIDE 25 MG/ML
12.5 INJECTION INTRAMUSCULAR; INTRAVENOUS; SUBCUTANEOUS PRN
Status: DISCONTINUED | OUTPATIENT
Start: 2023-06-15 | End: 2023-06-16 | Stop reason: HOSPADM

## 2023-06-15 RX ORDER — ACETAMINOPHEN 325 MG/1
650 TABLET ORAL
Status: DISCONTINUED | OUTPATIENT
Start: 2023-06-15 | End: 2023-06-16 | Stop reason: HOSPADM

## 2023-06-15 RX ADMIN — DIPHENHYDRAMINE HYDROCHLORIDE 50 MG: 50 INJECTION, SOLUTION INTRAMUSCULAR; INTRAVENOUS at 12:23

## 2023-06-15 RX ADMIN — SODIUM CHLORIDE 50 ML/HR: 9 INJECTION, SOLUTION INTRAVENOUS at 10:00

## 2023-06-15 RX ADMIN — SODIUM CHLORIDE, PRESERVATIVE FREE 10 ML: 5 INJECTION INTRAVENOUS at 09:50

## 2023-06-15 RX ADMIN — DEXAMETHASONE SODIUM PHOSPHATE 10 MG: 10 INJECTION INTRAMUSCULAR; INTRAVENOUS at 12:19

## 2023-06-15 RX ADMIN — PACLITAXEL 108 MG: 6 INJECTION, SOLUTION, CONCENTRATE INTRAVENOUS at 13:00

## 2023-06-15 RX ADMIN — FAMOTIDINE 20 MG: 10 INJECTION, SOLUTION INTRAVENOUS at 12:15

## 2023-06-15 RX ADMIN — ONDANSETRON 8 MG: 2 INJECTION INTRAMUSCULAR; INTRAVENOUS at 12:10

## 2023-06-15 NOTE — PROGRESS NOTES
Arrived to the [facility]. Taxol completed. Port needle and dressing changed. Patient tolerated well. Any issues or concerns during appointment: Urine protein >300 mg, per Dr. Martha Damon [facility] today. Patient aware of next infusion appointment on 6/29/2023 (date) at 80 (time). Patient aware of next lab and [facility] office visit on 6/29/2023 (date) at 21 [phone] (time). Patient instructed to call provider with temperature of 100.4 or greater or nausea/vomiting/ diarrhea or pain not controlled by medications  Discharged via wheelchair.

## 2023-06-19 ENCOUNTER — HOSPITAL ENCOUNTER (OUTPATIENT)
Dept: LAB | Age: 48
Discharge: HOME | End: 2023-06-22
Payer: COMMERCIAL

## 2023-06-19 ENCOUNTER — OFFICE VISIT (OUTPATIENT)
Dept: ONCOLOGY | Age: 48
End: 2023-06-19
Payer: COMMERCIAL

## 2023-06-19 VITALS
WEIGHT: 127.6 LBS | BODY MASS INDEX: 21.78 KG/M2 | HEIGHT: 64 IN | SYSTOLIC BLOOD PRESSURE: 124 MMHG | TEMPERATURE: 98.3 F | OXYGEN SATURATION: 99 % | HEART RATE: 118 BPM | DIASTOLIC BLOOD PRESSURE: 88 MMHG | RESPIRATION RATE: 16 BRPM

## 2023-06-19 DIAGNOSIS — N13.30 HYDRONEPHROSIS OF RIGHT KIDNEY: Primary | ICD-10-CM

## 2023-06-19 DIAGNOSIS — N13.30 HYDRONEPHROSIS OF RIGHT KIDNEY: ICD-10-CM

## 2023-06-19 LAB
APPEARANCE UR: ABNORMAL
BACTERIA URNS QL MICRO: NORMAL /HPF
BILIRUB UR QL: NEGATIVE
CASTS URNS QL MICRO: 0 /LPF
COLOR UR: ABNORMAL
CRYSTALS URNS QL MICRO: 0 /LPF
EPI CELLS #/AREA URNS HPF: NORMAL /HPF
GLUCOSE UR STRIP.AUTO-MCNC: NEGATIVE MG/DL
HGB UR QL STRIP: ABNORMAL
KETONES UR QL STRIP.AUTO: NEGATIVE MG/DL
LEUKOCYTE ESTERASE UR QL STRIP.AUTO: ABNORMAL
MUCOUS THREADS URNS QL MICRO: 0 /LPF
NITRITE UR QL STRIP.AUTO: NEGATIVE
OTHER OBSERVATIONS: NORMAL
PH UR STRIP: 8.5 (ref 5–9)
PROT UR STRIP-MCNC: >300 MG/DL
RBC #/AREA URNS HPF: >100 /HPF
SP GR UR REFRACTOMETRY: 1.02 (ref 1–1.02)
UROBILINOGEN UR QL STRIP.AUTO: 1 EU/DL
WBC URNS QL MICRO: NORMAL /HPF

## 2023-06-19 PROCEDURE — 87086 URINE CULTURE/COLONY COUNT: CPT

## 2023-06-19 PROCEDURE — 81001 URINALYSIS AUTO W/SCOPE: CPT

## 2023-06-19 PROCEDURE — 99213 OFFICE O/P EST LOW 20 MIN: CPT | Performed by: UROLOGY

## 2023-06-19 RX ORDER — AMOXICILLIN AND CLAVULANATE POTASSIUM 400; 57 MG/5ML; MG/5ML
25 POWDER, FOR SUSPENSION ORAL EVERY 12 HOURS SCHEDULED
Status: SHIPPED | OUTPATIENT
Start: 2023-06-21 | End: 2023-06-25

## 2023-06-19 ASSESSMENT — ENCOUNTER SYMPTOMS
RESPIRATORY NEGATIVE: 1
GASTROINTESTINAL NEGATIVE: 1

## 2023-06-19 NOTE — PATIENT INSTRUCTIONS
Patient Instructions from Today's Visit    Reason for Visit:  Follow up, stent exchange discussion     Diagnosis Information:  https://www.[employer]/. net/about-us/asco-answers-patient-education-materials/nzwu-wnlnruo-zydp-sheets      Plan:  There is still some swelling in your kidney from your last scan. Two options are to remove the stent and see how you do. Or we can take you back to the OR and replace the stent. Just the stent removal can be done in the office. If you want it replaced, this will need to be done in the OR. Follow Up: We are going to check your urine today to rule out active infection  We would like to see you back this Friday to take the stent out in the offic e    Recent Lab Results:  N/a    Treatment Summary has been discussed and given to patient:   N/a      -------------------------------------------------------------------------------------------------------------------    Patient does express an interest in My Chart. My Chart log in information explained on the after visit summary printout at the K[facility] [address] desk.     ANISHA Shin

## 2023-06-21 LAB
BACTERIA SPEC CULT: NORMAL
SERVICE CMNT-IMP: NORMAL

## 2023-06-22 DIAGNOSIS — N13.30 HYDRONEPHROSIS OF RIGHT KIDNEY: Primary | ICD-10-CM

## 2023-06-23 ENCOUNTER — PROCEDURE VISIT (OUTPATIENT)
Dept: ONCOLOGY | Age: 48
End: 2023-06-23

## 2023-06-23 VITALS
HEART RATE: 114 BPM | WEIGHT: 126.9 LBS | OXYGEN SATURATION: 96 % | DIASTOLIC BLOOD PRESSURE: 91 MMHG | RESPIRATION RATE: 20 BRPM | TEMPERATURE: 98.3 F | BODY MASS INDEX: 21.66 KG/M2 | SYSTOLIC BLOOD PRESSURE: 142 MMHG | HEIGHT: 64 IN

## 2023-06-23 DIAGNOSIS — N13.30 HYDRONEPHROSIS OF RIGHT KIDNEY: Primary | ICD-10-CM

## 2023-06-23 LAB
BILIRUBIN, URINE, POC: NEGATIVE
BLOOD URINE, POC: NORMAL
GLUCOSE URINE, POC: NEGATIVE
KETONES, URINE, POC: NEGATIVE
LEUKOCYTE ESTERASE, URINE, POC: NORMAL
NITRITE, URINE, POC: NEGATIVE
PH, URINE, POC: 7 (ref 4.6–8)
PROTEIN,URINE, POC: NORMAL
SPECIFIC GRAVITY, URINE, POC: 1.03 (ref 1–1.03)
URINALYSIS CLARITY, POC: CLEAR
URINALYSIS COLOR, POC: NORMAL
UROBILINOGEN, POC: NORMAL

## 2023-06-23 RX ORDER — AMOXICILLIN AND CLAVULANATE POTASSIUM 250; 62.5 MG/5ML; MG/5ML
250 POWDER, FOR SUSPENSION ORAL 2 TIMES DAILY
Qty: 100 ML | Refills: 0 | Status: SHIPPED | OUTPATIENT
Start: 2023-06-23 | End: 2023-07-03

## 2023-06-23 RX ORDER — AMOXICILLIN AND CLAVULANATE POTASSIUM 400; 57 MG/5ML; MG/5ML
400 POWDER, FOR SUSPENSION ORAL EVERY 12 HOURS SCHEDULED
Status: DISCONTINUED | OUTPATIENT
Start: 2023-06-23 | End: 2023-07-28 | Stop reason: HOSPADM

## 2023-06-23 ASSESSMENT — PATIENT HEALTH QUESTIONNAIRE - PHQ9
2. FEELING DOWN, DEPRESSED OR HOPELESS: 0
SUM OF ALL RESPONSES TO PHQ QUESTIONS 1-9: 0
SUM OF ALL RESPONSES TO PHQ9 QUESTIONS 1 & 2: 0
1. LITTLE INTEREST OR PLEASURE IN DOING THINGS: 0

## 2023-06-23 NOTE — PROGRESS NOTES
Pre Cystoscopy   At risk factors reviewed:   Autoimmune diseases: no  Artificial Joints: no  Mechanical heart valve/pacemaker: no  Indwelling ureteral stent: yes  Indwelling catheter: no  Elderly >80: no  Smoker: no  Oral steroid/prednisone use: no  Low weight: no  Hx of frequent UTI's: no  Prolonged hospital stay in the past 6mths (>10days): no  Diabetes: no    Provider informed of at risk factors: yes    ABX given: no
patient is here today for follow-up cystoscopy with stent removal.      Past medical history, surgical history, medications, allergies, family history and social history were reviewed and are unchanged other than what is noted above. Patient denies any hematuria or new lower urinary tract symptoms. REVIEW OF SYSTEMS: As above. All other systems negative. ALLERGIES:  No Known Allergies    PHYSICAL EXAMINATION:  There were no vitals taken for this visit. GENERAL: The patient is in no acute distress. CV:  Reg. Normal perfusion  PULMONARY: normal respiratory effort, no JVD, no audible wheezing. ABDOMEN: Soft, nontender. GENITOURINARY EXAM: Unremarkable. EXTREMITIES: Warm and well perfused. LABORATORY RESULTS:  No results found for this visit on 06/23/23. PROCEDURE NOTE: After informed consent was obtained, the patient was prepped and draped in the usual sterile fashion. A 2% lidocaine jelly was instilled in the patient's urethra. We performed a pre-procedure time out. I then inserted a flexible cystoscope and under direct vision carried it into the patient's bladder. The urethra appeared normal.  The bladder also appeared normal.  There was a blood clot around the stent. The stent was grasped with graspers and removed. Patient tolerated procedure well. There were no complications. IMPRESSION AND PLAN: Ms. Meir Tanner is a 50 y.o. 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 After long discussion we elected to remove her stent and see how she does. I plan to see her back in 2 weeks with BMP and renal ultrasound prior. She will take her Augmentin for the next 5 days to hopefully decrease any chance of UTI. She knows to call return to ED if she experiences any severe flank pain or fevers over 101.

## 2023-06-23 NOTE — PATIENT INSTRUCTIONS
Patient Instructions from Today's Visit    Reason for Visit:  Cystoscopy with stent removal    Diagnosis Information:  [email or web address] [email or web address]-us/asco-answers-patient-education-materials/irty-sqccpqx-erda-sheets    Plan:   your Augmentin and start it today. Take this in its entirety. If you develop a fever or chills, then go straight to the ER. You will have a renal ultrasound in a couple week and we will discuss your results at the next visit. Follow Up:  Two to three weeks with Dr. Aaron Fernández. Recent Lab Results:  N/A    Treatment Summary has been discussed and given to patient: N/A        -------------------------------------------------------------------------------------------------------------------  Please call our office at [phone] if you have any  of the following symptoms:   Fever of 100.5 or greater  Chills  Shortness of breath  Swelling or pain in one leg    After office hours an answering service is available and will contact a provider for emergencies or if you are experiencing any of the above symptoms. Patient does express an interest in My Chart. My Chart log in information explained on the after visit summary printout at the [facility] [address] desk.     Trina Hilton MA

## 2023-06-29 ENCOUNTER — HOSPITAL ENCOUNTER (OUTPATIENT)
Dept: INFUSION THERAPY | Age: 48
Discharge: HOME | End: 2023-06-29
Payer: COMMERCIAL

## 2023-06-29 ENCOUNTER — OFFICE VISIT (OUTPATIENT)
Dept: ONCOLOGY | Age: 48
End: 2023-06-29
Payer: COMMERCIAL

## 2023-06-29 ENCOUNTER — OFFICE VISIT (OUTPATIENT)
Dept: ONCOLOGY | Age: 48
End: 2023-06-29

## 2023-06-29 VITALS
RESPIRATION RATE: 16 BRPM | DIASTOLIC BLOOD PRESSURE: 81 MMHG | WEIGHT: 127.1 LBS | SYSTOLIC BLOOD PRESSURE: 116 MMHG | BODY MASS INDEX: 21.7 KG/M2 | HEIGHT: 64 IN | HEART RATE: 113 BPM | OXYGEN SATURATION: 99 % | TEMPERATURE: 98.8 F

## 2023-06-29 DIAGNOSIS — K21.9 GASTROESOPHAGEAL REFLUX DISEASE, UNSPECIFIED WHETHER ESOPHAGITIS PRESENT: ICD-10-CM

## 2023-06-29 DIAGNOSIS — G62.0 CHEMOTHERAPY-INDUCED NEUROPATHY (HCC): ICD-10-CM

## 2023-06-29 DIAGNOSIS — Z78.9 ON TOTAL PARENTERAL NUTRITION (TPN): ICD-10-CM

## 2023-06-29 DIAGNOSIS — T45.1X5A CHEMOTHERAPY-INDUCED NEUROPATHY (HCC): ICD-10-CM

## 2023-06-29 DIAGNOSIS — T45.1X5A CINV (CHEMOTHERAPY-INDUCED NAUSEA AND VOMITING): Primary | ICD-10-CM

## 2023-06-29 DIAGNOSIS — C78.6 METASTASIS TO PERITONEUM OF UNKNOWN PRIMARY (HCC): ICD-10-CM

## 2023-06-29 DIAGNOSIS — T45.1X5A CINV (CHEMOTHERAPY-INDUCED NAUSEA AND VOMITING): ICD-10-CM

## 2023-06-29 DIAGNOSIS — R79.89 ELEVATED LFTS: ICD-10-CM

## 2023-06-29 DIAGNOSIS — R11.2 CINV (CHEMOTHERAPY-INDUCED NAUSEA AND VOMITING): ICD-10-CM

## 2023-06-29 DIAGNOSIS — C78.6 PERITONEAL CARCINOMATOSIS (HCC): ICD-10-CM

## 2023-06-29 DIAGNOSIS — E86.0 DEHYDRATION: ICD-10-CM

## 2023-06-29 DIAGNOSIS — R53.83 FATIGUE DUE TO TREATMENT: ICD-10-CM

## 2023-06-29 DIAGNOSIS — C80.1 CARCINOMA OF UNKNOWN PRIMARY (HCC): Primary | ICD-10-CM

## 2023-06-29 DIAGNOSIS — R11.2 CINV (CHEMOTHERAPY-INDUCED NAUSEA AND VOMITING): Primary | ICD-10-CM

## 2023-06-29 DIAGNOSIS — C80.1 METASTASIS TO PERITONEUM OF UNKNOWN PRIMARY (HCC): ICD-10-CM

## 2023-06-29 DIAGNOSIS — C80.1 CARCINOMA OF UNKNOWN PRIMARY (HCC): ICD-10-CM

## 2023-06-29 DIAGNOSIS — Z00.8 NUTRITIONAL ASSESSMENT: Primary | ICD-10-CM

## 2023-06-29 LAB
ALBUMIN SERPL-MCNC: 2.3 G/DL (ref 3.5–5)
ALBUMIN/GLOB SERPL: 0.5 (ref 0.4–1.6)
ALP SERPL-CCNC: 247 U/L (ref 50–136)
ALT SERPL-CCNC: 196 U/L (ref 12–65)
AMORPH CRY URNS QL MICRO: NORMAL
ANION GAP SERPL CALC-SCNC: 8 MMOL/L (ref 2–11)
APPEARANCE UR: ABNORMAL
AST SERPL-CCNC: 176 U/L (ref 15–37)
BACTERIA URNS QL MICRO: NORMAL /HPF
BASOPHILS # BLD: 0 K/UL (ref 0–0.2)
BASOPHILS NFR BLD: 0 % (ref 0–2)
BILIRUB SERPL-MCNC: 0.4 MG/DL (ref 0.2–1.1)
BILIRUB UR QL: NEGATIVE
BUN SERPL-MCNC: 28 MG/DL (ref 6–23)
CALCIUM SERPL-MCNC: 8.9 MG/DL (ref 8.3–10.4)
CASTS URNS QL MICRO: 0 /LPF
CHLORIDE SERPL-SCNC: 108 MMOL/L (ref 101–110)
CO2 SERPL-SCNC: 28 MMOL/L (ref 21–32)
COLOR UR: ABNORMAL
CREAT SERPL-MCNC: 0.4 MG/DL (ref 0.6–1)
CRYSTALS URNS QL MICRO: 0 /LPF
DIFFERENTIAL METHOD BLD: ABNORMAL
EOSINOPHIL # BLD: 0 K/UL (ref 0–0.8)
EOSINOPHIL NFR BLD: 0 % (ref 0.5–7.8)
EPI CELLS #/AREA URNS HPF: NORMAL /HPF
ERYTHROCYTE [DISTWIDTH] IN BLOOD BY AUTOMATED COUNT: 25.7 % (ref 11.9–14.6)
GLOBULIN SER CALC-MCNC: 4.3 G/DL (ref 2.8–4.5)
GLUCOSE SERPL-MCNC: 116 MG/DL (ref 65–100)
GLUCOSE UR STRIP.AUTO-MCNC: NEGATIVE MG/DL
HCT VFR BLD AUTO: 28 % (ref 35.8–46.3)
HGB BLD-MCNC: 7.6 G/DL (ref 11.7–15.4)
HGB UR QL STRIP: ABNORMAL
IMM GRANULOCYTES # BLD AUTO: 0 K/UL (ref 0–0.5)
IMM GRANULOCYTES NFR BLD AUTO: 0 % (ref 0–5)
KETONES UR QL STRIP.AUTO: NEGATIVE MG/DL
LEUKOCYTE ESTERASE UR QL STRIP.AUTO: ABNORMAL
LYMPHOCYTES # BLD: 1.5 K/UL (ref 0.5–4.6)
LYMPHOCYTES NFR BLD: 18 % (ref 13–44)
MCH RBC QN AUTO: 24.3 PG (ref 26.1–32.9)
MCHC RBC AUTO-ENTMCNC: 27.1 G/DL (ref 31.4–35)
MCV RBC AUTO: 89.5 FL (ref 82–102)
MONOCYTES # BLD: 1.1 K/UL (ref 0.1–1.3)
MONOCYTES NFR BLD: 13 % (ref 4–12)
MUCOUS THREADS URNS QL MICRO: 0 /LPF
NEUTS SEG # BLD: 5.7 K/UL (ref 1.7–8.2)
NEUTS SEG NFR BLD: 69 % (ref 43–78)
NITRITE UR QL STRIP.AUTO: NEGATIVE
NRBC # BLD: 0.08 K/UL (ref 0–0.2)
PH UR STRIP: 7.5 (ref 5–9)
PLATELET # BLD AUTO: 521 K/UL (ref 150–450)
PMV BLD AUTO: 11.1 FL (ref 9.4–12.3)
POTASSIUM SERPL-SCNC: 4.2 MMOL/L (ref 3.5–5.1)
PROT SERPL-MCNC: 6.6 G/DL (ref 6.3–8.2)
PROT UR STRIP-MCNC: 100 MG/DL
RBC # BLD AUTO: 3.13 M/UL (ref 4.05–5.2)
RBC #/AREA URNS HPF: >100 /HPF
SODIUM SERPL-SCNC: 144 MMOL/L (ref 133–143)
SP GR UR REFRACTOMETRY: 1.02 (ref 1–1.02)
UROBILINOGEN UR QL STRIP.AUTO: 2 EU/DL
WBC # BLD AUTO: 8.4 K/UL (ref 4.3–11.1)
WBC URNS QL MICRO: NORMAL /HPF

## 2023-06-29 PROCEDURE — 81001 URINALYSIS AUTO W/SCOPE: CPT

## 2023-06-29 PROCEDURE — 36591 DRAW BLOOD OFF VENOUS DEVICE: CPT

## 2023-06-29 PROCEDURE — 80053 COMPREHEN METABOLIC PANEL: CPT

## 2023-06-29 PROCEDURE — 2580000003 HC RX 258: Performed by: INTERNAL MEDICINE

## 2023-06-29 PROCEDURE — 96374 THER/PROPH/DIAG INJ IV PUSH: CPT

## 2023-06-29 PROCEDURE — 96375 TX/PRO/DX INJ NEW DRUG ADDON: CPT

## 2023-06-29 PROCEDURE — 85025 COMPLETE CBC W/AUTO DIFF WBC: CPT

## 2023-06-29 PROCEDURE — 2580000003 HC RX 258: Performed by: NURSE PRACTITIONER

## 2023-06-29 PROCEDURE — 6360000002 HC RX W HCPCS: Performed by: NURSE PRACTITIONER

## 2023-06-29 PROCEDURE — 99214 OFFICE O/P EST MOD 30 MIN: CPT | Performed by: NURSE PRACTITIONER

## 2023-06-29 PROCEDURE — 96361 HYDRATE IV INFUSION ADD-ON: CPT

## 2023-06-29 RX ORDER — ONDANSETRON 2 MG/ML
8 INJECTION INTRAMUSCULAR; INTRAVENOUS EVERY 6 HOURS PRN
Status: CANCELLED
Start: 2023-06-29

## 2023-06-29 RX ORDER — SODIUM CHLORIDE 9 MG/ML
INJECTION, SOLUTION INTRAVENOUS ONCE
Status: CANCELLED
Start: 2023-06-29 | End: 2023-06-29

## 2023-06-29 RX ORDER — SODIUM CHLORIDE 0.9 % (FLUSH) 0.9 %
10 SYRINGE (ML) INJECTION PRN
Status: DISCONTINUED | OUTPATIENT
Start: 2023-06-29 | End: 2023-06-30 | Stop reason: HOSPADM

## 2023-06-29 RX ORDER — LORAZEPAM 2 MG/ML
1 INJECTION INTRAMUSCULAR ONCE
Status: CANCELLED
Start: 2023-06-29 | End: 2023-06-29

## 2023-06-29 RX ORDER — LORAZEPAM 2 MG/ML
1 CONCENTRATE ORAL EVERY 8 HOURS PRN
Qty: 45 ML | Refills: 0 | Status: SHIPPED | OUTPATIENT
Start: 2023-06-29 | End: 2023-07-29

## 2023-06-29 RX ORDER — LORAZEPAM 2 MG/ML
1 INJECTION INTRAMUSCULAR ONCE
Status: COMPLETED | OUTPATIENT
Start: 2023-06-29 | End: 2023-06-29

## 2023-06-29 RX ORDER — SODIUM CHLORIDE 9 MG/ML
INJECTION, SOLUTION INTRAVENOUS ONCE
Status: COMPLETED | OUTPATIENT
Start: 2023-06-29 | End: 2023-06-29

## 2023-06-29 RX ORDER — ONDANSETRON 2 MG/ML
8 INJECTION INTRAMUSCULAR; INTRAVENOUS EVERY 6 HOURS PRN
Status: DISPENSED | OUTPATIENT
Start: 2023-06-29 | End: 2023-06-29

## 2023-06-29 RX ADMIN — ONDANSETRON 8 MG: 2 INJECTION INTRAMUSCULAR; INTRAVENOUS at 11:38

## 2023-06-29 RX ADMIN — SODIUM CHLORIDE, PRESERVATIVE FREE 10 ML: 5 INJECTION INTRAVENOUS at 12:42

## 2023-06-29 RX ADMIN — SODIUM CHLORIDE: 9 INJECTION, SOLUTION INTRAVENOUS at 11:40

## 2023-06-29 RX ADMIN — LORAZEPAM 1 MG: 2 INJECTION INTRAMUSCULAR; INTRAVENOUS at 11:36

## 2023-06-29 RX ADMIN — SODIUM CHLORIDE, PRESERVATIVE FREE 10 ML: 5 INJECTION INTRAVENOUS at 10:10

## 2023-06-29 ASSESSMENT — ANXIETY QUESTIONNAIRES
3. WORRYING TOO MUCH ABOUT DIFFERENT THINGS: 0
5. BEING SO RESTLESS THAT IT IS HARD TO SIT STILL: 0
6. BECOMING EASILY ANNOYED OR IRRITABLE: 0
4. TROUBLE RELAXING: 0
1. FEELING NERVOUS, ANXIOUS, OR ON EDGE: 0
IF YOU CHECKED OFF ANY PROBLEMS ON THIS QUESTIONNAIRE, HOW DIFFICULT HAVE THESE PROBLEMS MADE IT FOR YOU TO DO YOUR WORK, TAKE CARE OF THINGS AT HOME, OR GET ALONG WITH OTHER PEOPLE: NOT DIFFICULT AT ALL
2. NOT BEING ABLE TO STOP OR CONTROL WORRYING: 0
7. FEELING AFRAID AS IF SOMETHING AWFUL MIGHT HAPPEN: 0
GAD7 TOTAL SCORE: 0

## 2023-06-29 ASSESSMENT — PATIENT HEALTH QUESTIONNAIRE - PHQ9
SUM OF ALL RESPONSES TO PHQ QUESTIONS 1-9: 5
7. TROUBLE CONCENTRATING ON THINGS, SUCH AS READING THE NEWSPAPER OR WATCHING TELEVISION: 0
SUM OF ALL RESPONSES TO PHQ QUESTIONS 1-9: 5
SUM OF ALL RESPONSES TO PHQ9 QUESTIONS 1 & 2: 0
SUM OF ALL RESPONSES TO PHQ QUESTIONS 1-9: 5
SUM OF ALL RESPONSES TO PHQ QUESTIONS 1-9: 5
8. MOVING OR SPEAKING SO SLOWLY THAT OTHER PEOPLE COULD HAVE NOTICED. OR THE OPPOSITE, BEING SO FIGETY OR RESTLESS THAT YOU HAVE BEEN MOVING AROUND A LOT MORE THAN USUAL: 0
4. FEELING TIRED OR HAVING LITTLE ENERGY: 2
5. POOR APPETITE OR OVEREATING: 1
3. TROUBLE FALLING OR STAYING ASLEEP: 2
6. FEELING BAD ABOUT YOURSELF - OR THAT YOU ARE A FAILURE OR HAVE LET YOURSELF OR YOUR FAMILY DOWN: 0
9. THOUGHTS THAT YOU WOULD BE BETTER OFF DEAD, OR OF HURTING YOURSELF: 0
1. LITTLE INTEREST OR PLEASURE IN DOING THINGS: 0
2. FEELING DOWN, DEPRESSED OR HOPELESS: 0
10. IF YOU CHECKED OFF ANY PROBLEMS, HOW DIFFICULT HAVE THESE PROBLEMS MADE IT FOR YOU TO DO YOUR WORK, TAKE CARE OF THINGS AT HOME, OR GET ALONG WITH OTHER PEOPLE: 0

## 2023-07-03 ENCOUNTER — HOSPITAL ENCOUNTER (OUTPATIENT)
Dept: INFUSION THERAPY | Age: 48
Discharge: HOME | End: 2023-07-03
Payer: COMMERCIAL

## 2023-07-03 ENCOUNTER — TELEPHONE (OUTPATIENT)
Dept: ONCOLOGY | Age: 48
End: 2023-07-03

## 2023-07-03 VITALS
HEART RATE: 108 BPM | SYSTOLIC BLOOD PRESSURE: 131 MMHG | DIASTOLIC BLOOD PRESSURE: 91 MMHG | OXYGEN SATURATION: 99 % | TEMPERATURE: 99 F | RESPIRATION RATE: 16 BRPM

## 2023-07-03 DIAGNOSIS — C80.1 METASTASIS TO PERITONEUM OF UNKNOWN PRIMARY (HCC): ICD-10-CM

## 2023-07-03 DIAGNOSIS — R11.2 CINV (CHEMOTHERAPY-INDUCED NAUSEA AND VOMITING): Primary | ICD-10-CM

## 2023-07-03 DIAGNOSIS — T45.1X5A CINV (CHEMOTHERAPY-INDUCED NAUSEA AND VOMITING): Primary | ICD-10-CM

## 2023-07-03 DIAGNOSIS — E86.0 DEHYDRATION: ICD-10-CM

## 2023-07-03 DIAGNOSIS — C78.6 METASTASIS TO PERITONEUM OF UNKNOWN PRIMARY (HCC): ICD-10-CM

## 2023-07-03 PROCEDURE — 96360 HYDRATION IV INFUSION INIT: CPT

## 2023-07-03 PROCEDURE — 2580000003 HC RX 258: Performed by: INTERNAL MEDICINE

## 2023-07-03 RX ORDER — SODIUM CHLORIDE 9 MG/ML
INJECTION, SOLUTION INTRAVENOUS ONCE
Status: CANCELLED
Start: 2023-07-03 | End: 2023-07-03

## 2023-07-03 RX ORDER — SODIUM CHLORIDE 9 MG/ML
INJECTION, SOLUTION INTRAVENOUS ONCE
Status: COMPLETED | OUTPATIENT
Start: 2023-07-03 | End: 2023-07-03

## 2023-07-03 RX ORDER — SODIUM CHLORIDE 0.9 % (FLUSH) 0.9 %
5-40 SYRINGE (ML) INJECTION 2 TIMES DAILY
Status: DISCONTINUED | OUTPATIENT
Start: 2023-07-03 | End: 2023-07-04 | Stop reason: HOSPADM

## 2023-07-03 RX ADMIN — SODIUM CHLORIDE, PRESERVATIVE FREE 10 ML: 5 INJECTION INTRAVENOUS at 16:46

## 2023-07-03 RX ADMIN — SODIUM CHLORIDE: 9 INJECTION, SOLUTION INTRAVENOUS at 16:45

## 2023-07-05 NOTE — PROGRESS NOTES
Nutrition F/U:  Assessment:  Pt seen during office visit w/ NP, chemo held today due to elevated LFT's, schedule adjusted to start cycle 10 of Cyramza + Taxol next week. Pt remains NPO and TPN dependent for estimated nutrition needs, managed by [facility], Pepcid dose increased in TPN given persistent reflux - also taking Carafate QID, but uncertain how much she digests of this - constantly spitting into emesis bag; reports it burns too bad to swallow so she spits into the bag, and not that she can't physically swallow, excessive secretions noted. Labs notable for Na (144), BUN (28), Cr (0.4), glucose (116), Albumin (2.3), AST (176), ALT (196), Alk Phos (247), will send lab results to ROSALIA Wallis at [facility]. Current BW: 127#, stable over the past month. Intervention:  1. NPO, thin liquids as tolerated for pleasure  2. Continue w/ Carafate QID, has Protonix (40 mg) prescribed BID - listed as not taking, Pepcid in TPN. 3. Continue w/ TPN, managed by [employer], labs faxed. 4. Pt has follow up with GI later this month, encouraged to try and get an earlier appointment given persistent severe reflux. 5. Pt may benefit from home suction machine - will discuss with her at next visit. 6. Chemo delayed x 1 week. Liquid Ativan added today. Pt will receive 1 L NS today in infusion. Monitoring/Evaluation:  1. RD to follow up during next office visit - follow up wt status, tolerance/intake of po diet, lab values w/ TPN,  symptom management.       [address] [place] [address]

## 2023-07-06 ENCOUNTER — HOSPITAL ENCOUNTER (OUTPATIENT)
Dept: INFUSION THERAPY | Age: 48
Discharge: HOME | End: 2023-07-06
Payer: COMMERCIAL

## 2023-07-06 ENCOUNTER — OFFICE VISIT (OUTPATIENT)
Dept: ONCOLOGY | Age: 48
End: 2023-07-06

## 2023-07-06 ENCOUNTER — OFFICE VISIT (OUTPATIENT)
Dept: ONCOLOGY | Age: 48
End: 2023-07-06
Payer: COMMERCIAL

## 2023-07-06 VITALS
BODY MASS INDEX: 21.8 KG/M2 | SYSTOLIC BLOOD PRESSURE: 107 MMHG | OXYGEN SATURATION: 99 % | WEIGHT: 127 LBS | TEMPERATURE: 98.5 F | HEART RATE: 112 BPM | RESPIRATION RATE: 20 BRPM | DIASTOLIC BLOOD PRESSURE: 86 MMHG

## 2023-07-06 DIAGNOSIS — T45.1X5A CINV (CHEMOTHERAPY-INDUCED NAUSEA AND VOMITING): ICD-10-CM

## 2023-07-06 DIAGNOSIS — Z00.8 NUTRITIONAL ASSESSMENT: Primary | ICD-10-CM

## 2023-07-06 DIAGNOSIS — Z78.9 ON TOTAL PARENTERAL NUTRITION (TPN): ICD-10-CM

## 2023-07-06 DIAGNOSIS — R11.2 CINV (CHEMOTHERAPY-INDUCED NAUSEA AND VOMITING): ICD-10-CM

## 2023-07-06 DIAGNOSIS — E86.0 DEHYDRATION: ICD-10-CM

## 2023-07-06 DIAGNOSIS — C80.1 METASTASIS TO PERITONEUM OF UNKNOWN PRIMARY (HCC): ICD-10-CM

## 2023-07-06 DIAGNOSIS — C78.6 METASTASIS TO PERITONEUM OF UNKNOWN PRIMARY (HCC): ICD-10-CM

## 2023-07-06 DIAGNOSIS — R11.2 CINV (CHEMOTHERAPY-INDUCED NAUSEA AND VOMITING): Primary | ICD-10-CM

## 2023-07-06 DIAGNOSIS — C80.1 CARCINOMA OF UNKNOWN PRIMARY (HCC): ICD-10-CM

## 2023-07-06 DIAGNOSIS — T45.1X5A CINV (CHEMOTHERAPY-INDUCED NAUSEA AND VOMITING): Primary | ICD-10-CM

## 2023-07-06 DIAGNOSIS — C78.6 PERITONEAL CARCINOMATOSIS (HCC): Primary | ICD-10-CM

## 2023-07-06 LAB
ALBUMIN SERPL-MCNC: 2.2 G/DL (ref 3.5–5)
ALBUMIN/GLOB SERPL: 0.5 (ref 0.4–1.6)
ALP SERPL-CCNC: 263 U/L (ref 50–136)
ALT SERPL-CCNC: 217 U/L (ref 12–65)
ANION GAP SERPL CALC-SCNC: 6 MMOL/L (ref 2–11)
AST SERPL-CCNC: 151 U/L (ref 15–37)
BASOPHILS # BLD: 0 K/UL (ref 0–0.2)
BASOPHILS NFR BLD: 0 % (ref 0–2)
BILIRUB SERPL-MCNC: 0.5 MG/DL (ref 0.2–1.1)
BUN SERPL-MCNC: 23 MG/DL (ref 6–23)
CALCIUM SERPL-MCNC: 8.9 MG/DL (ref 8.3–10.4)
CHLORIDE SERPL-SCNC: 108 MMOL/L (ref 101–110)
CO2 SERPL-SCNC: 27 MMOL/L (ref 21–32)
CREAT SERPL-MCNC: 0.4 MG/DL (ref 0.6–1)
DIFFERENTIAL METHOD BLD: ABNORMAL
EOSINOPHIL # BLD: 0.1 K/UL (ref 0–0.8)
EOSINOPHIL NFR BLD: 1 % (ref 0.5–7.8)
ERYTHROCYTE [DISTWIDTH] IN BLOOD BY AUTOMATED COUNT: 25 % (ref 11.9–14.6)
GLOBULIN SER CALC-MCNC: 4.3 G/DL (ref 2.8–4.5)
GLUCOSE SERPL-MCNC: 137 MG/DL (ref 65–100)
HCT VFR BLD AUTO: 28.4 % (ref 35.8–46.3)
HGB BLD-MCNC: 8 G/DL (ref 11.7–15.4)
IMM GRANULOCYTES # BLD AUTO: 0.1 K/UL (ref 0–0.5)
IMM GRANULOCYTES NFR BLD AUTO: 1 % (ref 0–5)
LYMPHOCYTES # BLD: 1.7 K/UL (ref 0.5–4.6)
LYMPHOCYTES NFR BLD: 14 % (ref 13–44)
MAGNESIUM SERPL-MCNC: 2.2 MG/DL (ref 1.8–2.4)
MCH RBC QN AUTO: 25.2 PG (ref 26.1–32.9)
MCHC RBC AUTO-ENTMCNC: 28.2 G/DL (ref 31.4–35)
MCV RBC AUTO: 89.6 FL (ref 82–102)
MONOCYTES # BLD: 1.3 K/UL (ref 0.1–1.3)
MONOCYTES NFR BLD: 11 % (ref 4–12)
NEUTS SEG # BLD: 9.2 K/UL (ref 1.7–8.2)
NEUTS SEG NFR BLD: 74 % (ref 43–78)
NRBC # BLD: 0.07 K/UL (ref 0–0.2)
PLATELET # BLD AUTO: 519 K/UL (ref 150–450)
PMV BLD AUTO: 10.6 FL (ref 9.4–12.3)
POTASSIUM SERPL-SCNC: 4.1 MMOL/L (ref 3.5–5.1)
PROT SERPL-MCNC: 6.5 G/DL (ref 6.3–8.2)
PROT UR-MCNC: 104 MG/DL
RBC # BLD AUTO: 3.17 M/UL (ref 4.05–5.2)
SODIUM SERPL-SCNC: 141 MMOL/L (ref 133–143)
WBC # BLD AUTO: 12.4 K/UL (ref 4.3–11.1)

## 2023-07-06 PROCEDURE — 96361 HYDRATE IV INFUSION ADD-ON: CPT

## 2023-07-06 PROCEDURE — 99214 OFFICE O/P EST MOD 30 MIN: CPT | Performed by: NURSE PRACTITIONER

## 2023-07-06 PROCEDURE — 2580000003 HC RX 258: Performed by: INTERNAL MEDICINE

## 2023-07-06 PROCEDURE — 2580000003 HC RX 258: Performed by: NURSE PRACTITIONER

## 2023-07-06 PROCEDURE — 6360000002 HC RX W HCPCS: Performed by: NURSE PRACTITIONER

## 2023-07-06 PROCEDURE — 80053 COMPREHEN METABOLIC PANEL: CPT

## 2023-07-06 PROCEDURE — 36591 DRAW BLOOD OFF VENOUS DEVICE: CPT

## 2023-07-06 PROCEDURE — 83735 ASSAY OF MAGNESIUM: CPT

## 2023-07-06 PROCEDURE — 84156 ASSAY OF PROTEIN URINE: CPT

## 2023-07-06 PROCEDURE — 96375 TX/PRO/DX INJ NEW DRUG ADDON: CPT

## 2023-07-06 PROCEDURE — 96374 THER/PROPH/DIAG INJ IV PUSH: CPT

## 2023-07-06 PROCEDURE — 85025 COMPLETE CBC W/AUTO DIFF WBC: CPT

## 2023-07-06 RX ORDER — SODIUM CHLORIDE 9 MG/ML
INJECTION, SOLUTION INTRAVENOUS ONCE
Status: CANCELLED
Start: 2023-07-06 | End: 2023-07-06

## 2023-07-06 RX ORDER — ONDANSETRON 2 MG/ML
8 INJECTION INTRAMUSCULAR; INTRAVENOUS EVERY 6 HOURS PRN
Status: CANCELLED
Start: 2023-07-06

## 2023-07-06 RX ORDER — DIPHENHYDRAMINE HYDROCHLORIDE 50 MG/ML
25 INJECTION INTRAMUSCULAR; INTRAVENOUS ONCE
Status: CANCELLED
Start: 2023-07-06 | End: 2023-07-06

## 2023-07-06 RX ORDER — DIPHENHYDRAMINE HYDROCHLORIDE 50 MG/ML
25 INJECTION INTRAMUSCULAR; INTRAVENOUS ONCE
Status: COMPLETED | OUTPATIENT
Start: 2023-07-06 | End: 2023-07-06

## 2023-07-06 RX ORDER — SODIUM CHLORIDE 9 MG/ML
INJECTION, SOLUTION INTRAVENOUS ONCE
Status: COMPLETED | OUTPATIENT
Start: 2023-07-06 | End: 2023-07-06

## 2023-07-06 RX ORDER — SODIUM CHLORIDE 0.9 % (FLUSH) 0.9 %
5-40 SYRINGE (ML) INJECTION PRN
Status: DISCONTINUED | OUTPATIENT
Start: 2023-07-06 | End: 2023-07-07 | Stop reason: HOSPADM

## 2023-07-06 RX ORDER — 0.9 % SODIUM CHLORIDE 0.9 %
1000 INTRAVENOUS SOLUTION INTRAVENOUS ONCE
Status: COMPLETED | OUTPATIENT
Start: 2023-07-06 | End: 2023-07-06

## 2023-07-06 RX ORDER — ONDANSETRON 2 MG/ML
8 INJECTION INTRAMUSCULAR; INTRAVENOUS EVERY 6 HOURS PRN
Status: DISCONTINUED | OUTPATIENT
Start: 2023-07-06 | End: 2023-07-07 | Stop reason: HOSPADM

## 2023-07-06 RX ADMIN — SODIUM CHLORIDE 1000 ML: 9 INJECTION, SOLUTION INTRAVENOUS at 15:17

## 2023-07-06 RX ADMIN — SODIUM CHLORIDE, PRESERVATIVE FREE 10 ML: 5 INJECTION INTRAVENOUS at 12:51

## 2023-07-06 RX ADMIN — ONDANSETRON 8 MG: 2 INJECTION INTRAMUSCULAR; INTRAVENOUS at 14:26

## 2023-07-06 RX ADMIN — SODIUM CHLORIDE: 9 INJECTION, SOLUTION INTRAVENOUS at 14:15

## 2023-07-06 RX ADMIN — DIPHENHYDRAMINE HYDROCHLORIDE 25 MG: 50 INJECTION, SOLUTION INTRAMUSCULAR; INTRAVENOUS at 14:25

## 2023-07-06 RX ADMIN — SODIUM CHLORIDE, PRESERVATIVE FREE 10 ML: 5 INJECTION INTRAVENOUS at 14:12

## 2023-07-06 NOTE — PROGRESS NOTES
Arrived to the [address] [place] [address]. 2L NS boluses, IV Zofran and IV Benadryl completed. Patient tolerated well. Any issues or concerns during appointment: none. Patient aware of next infusion appointment on 7/13/23 (date) at 11:30 AM (time). Patient instructed to call provider with temperature of 100.4 or greater or nausea/vomiting/diarrhea or pain not controlled by medications  Discharged via wheelchair with family.

## 2023-07-06 NOTE — PROGRESS NOTES
Patient arrived to port lab for port access and lab draw   [address] accessed and labs drawn per protocol   *Port remains accessed   Patient discharged from port lab via wheelchair

## 2023-07-06 NOTE — PROGRESS NOTES
[facility] Hematology and Oncology: Office Visit Established Patient    Reason for follow up:    Tracey Hutson  is seen in follow-up for carcinoma of unknown primary. Overview: (copied from prior)  Ms. Bibi Jensen was seen for the first time in our office in February 2022. She was a woman of previously good health who began noticing left-sided back discomfort in early January 2022. This was associated  ultimately with a sense of difficulty swallowing and ultimately she presented to [facility] for evaluation. Her symptoms were felt to potentially be indicative of a bowel obstruction and she was sent for a CT scan. This study, performed on January 27, 2022  was notable for a linear calcific density along the course of the proximal left ureter with associated moderate hydronephrosis potentially indicative of an intraureteral calculus. There was also stranding throughout the retroperitoneal soft tissues anterior  to the left psoas muscle with pelvic ascites. There was also wall thickening involving the descending colon. The question of colitis or serosal implants was raised. The patient had undergone a gastric sleeve placement several years prior. She had  also undergone a negative colonoscopy about 3 years prior to these presentations. There was a history of anemia ultimately resulting in the performance of a hysterectomy approximately 3 years ago for menorrhagia. Because of the CT scan findings, she  was ultimately referred to Dr. Yousif Santamaria for evaluation of a possible pelvic malignancy. On February 1, 2022 he performed a laparoscopy and biopsy with evidence of peritoneal carcinomatosis grossly. A \"peritoneal nodule\" and a \"peritoneal implant\"  were both positive for a poorly differentiated metastatic carcinoma potentially consistent with an upper gastrointestinal primary. An omental biopsy showed no evidence of malignancy.   Immunohistochemistries were positive for cytokeratin 7 and negative  for

## 2023-07-07 ENCOUNTER — HOSPITAL ENCOUNTER (OUTPATIENT)
Dept: ULTRASOUND IMAGING | Age: 48
Discharge: HOME | End: 2023-07-07
Attending: UROLOGY
Payer: COMMERCIAL

## 2023-07-07 ENCOUNTER — HOSPITAL ENCOUNTER (OUTPATIENT)
Dept: ULTRASOUND IMAGING | Age: 48
End: 2023-07-07
Attending: UROLOGY
Payer: COMMERCIAL

## 2023-07-07 DIAGNOSIS — N13.30 HYDRONEPHROSIS OF RIGHT KIDNEY: ICD-10-CM

## 2023-07-07 DIAGNOSIS — C78.6 PERITONEAL CARCINOMATOSIS (HCC): ICD-10-CM

## 2023-07-07 PROCEDURE — 76770 US EXAM ABDO BACK WALL COMP: CPT

## 2023-07-07 PROCEDURE — 76705 ECHO EXAM OF ABDOMEN: CPT

## 2023-07-07 NOTE — PROGRESS NOTES
Nutrition F/U:  RD was unable to see pt during today's office visit, and sleeping in infusion. Pt remains NPO/TPN dependent for estimated nutrition needs, managed by [facility]. Pt continues to spit into an emesis bag, did have NP ask pt if she wanted a home suction machine and she declined. Treatment held again today d/t elevated LFT's, has US tomorrow. RD to follow up during next office visit.     [id number] [facility], [address] [facility]

## 2023-07-10 ENCOUNTER — OFFICE VISIT (OUTPATIENT)
Dept: ONCOLOGY | Age: 48
End: 2023-07-10
Payer: COMMERCIAL

## 2023-07-10 VITALS
TEMPERATURE: 97.4 F | HEIGHT: 64 IN | DIASTOLIC BLOOD PRESSURE: 88 MMHG | WEIGHT: 130.5 LBS | BODY MASS INDEX: 22.28 KG/M2 | HEART RATE: 109 BPM | SYSTOLIC BLOOD PRESSURE: 124 MMHG | OXYGEN SATURATION: 96 % | RESPIRATION RATE: 19 BRPM

## 2023-07-10 DIAGNOSIS — N13.30 HYDRONEPHROSIS OF RIGHT KIDNEY: Primary | ICD-10-CM

## 2023-07-10 PROCEDURE — 99213 OFFICE O/P EST LOW 20 MIN: CPT | Performed by: UROLOGY

## 2023-07-10 ASSESSMENT — PATIENT HEALTH QUESTIONNAIRE - PHQ9
SUM OF ALL RESPONSES TO PHQ QUESTIONS 1-9: 0
2. FEELING DOWN, DEPRESSED OR HOPELESS: 0
SUM OF ALL RESPONSES TO PHQ QUESTIONS 1-9: 0
SUM OF ALL RESPONSES TO PHQ QUESTIONS 1-9: 0
1. LITTLE INTEREST OR PLEASURE IN DOING THINGS: 0
SUM OF ALL RESPONSES TO PHQ9 QUESTIONS 1 & 2: 0
SUM OF ALL RESPONSES TO PHQ QUESTIONS 1-9: 0

## 2023-07-10 ASSESSMENT — ENCOUNTER SYMPTOMS
RESPIRATORY NEGATIVE: 1
GASTROINTESTINAL NEGATIVE: 1

## 2023-07-10 NOTE — PATIENT INSTRUCTIONS
Patient Instructions from Today's Visit    Reason for Visit:  Follow up     Diagnosis Information:  [email or web address] [email or web address]-us/asco-answers-patient-education-materials/rkys-urqihoo-fdpk-sheets      Plan: Your ultrasound is stable   Follow Up:  As needed    Recent Lab Results:      Treatment Summary has been discussed and given to patient:         -------------------------------------------------------------------------------------------------------------------    Patient does express an interest in My Chart. My Chart log in information explained on the after visit summary printout at the [address] desk.     ANISHA Avalos

## 2023-07-12 ENCOUNTER — APPOINTMENT (OUTPATIENT)
Dept: CT IMAGING | Age: 48
End: 2023-07-12

## 2023-07-12 ENCOUNTER — HOSPITAL ENCOUNTER (EMERGENCY)
Age: 48
Discharge: HOME | End: 2023-07-12
Attending: EMERGENCY MEDICINE

## 2023-07-12 ENCOUNTER — APPOINTMENT (OUTPATIENT)
Dept: GENERAL RADIOLOGY | Age: 48
End: 2023-07-12

## 2023-07-12 VITALS
SYSTOLIC BLOOD PRESSURE: 136 MMHG | HEIGHT: 64 IN | WEIGHT: 125 LBS | TEMPERATURE: 98.7 F | BODY MASS INDEX: 21.34 KG/M2 | RESPIRATION RATE: 18 BRPM | DIASTOLIC BLOOD PRESSURE: 102 MMHG | OXYGEN SATURATION: 98 % | HEART RATE: 85 BPM

## 2023-07-12 DIAGNOSIS — E86.0 DEHYDRATION: Primary | ICD-10-CM

## 2023-07-12 DIAGNOSIS — C78.6 PERITONEAL CARCINOMATOSIS (HCC): Primary | ICD-10-CM

## 2023-07-12 DIAGNOSIS — R11.2 NAUSEA AND VOMITING, UNSPECIFIED VOMITING TYPE: ICD-10-CM

## 2023-07-12 LAB
ALBUMIN SERPL-MCNC: 2.1 G/DL (ref 3.5–5)
ALBUMIN/GLOB SERPL: 0.4 (ref 0.4–1.6)
ALP SERPL-CCNC: 266 U/L (ref 50–136)
ALT SERPL-CCNC: 168 U/L (ref 12–65)
ANION GAP SERPL CALC-SCNC: 6 MMOL/L (ref 2–11)
APPEARANCE UR: CLEAR
AST SERPL-CCNC: 107 U/L (ref 15–37)
BACTERIA URNS QL MICRO: ABNORMAL /HPF
BASOPHILS # BLD: 0 K/UL (ref 0–0.2)
BASOPHILS NFR BLD: 0 % (ref 0–2)
BILIRUB SERPL-MCNC: 0.5 MG/DL (ref 0.2–1.1)
BILIRUB UR QL: NEGATIVE
BUN SERPL-MCNC: 22 MG/DL (ref 6–23)
CALCIUM SERPL-MCNC: 9 MG/DL (ref 8.3–10.4)
CASTS URNS QL MICRO: 0 /LPF
CHLORIDE SERPL-SCNC: 110 MMOL/L (ref 101–110)
CO2 SERPL-SCNC: 27 MMOL/L (ref 21–32)
COLOR UR: ABNORMAL
CREAT SERPL-MCNC: 0.5 MG/DL (ref 0.6–1)
CRYSTALS URNS QL MICRO: 0 /LPF
DIFFERENTIAL METHOD BLD: ABNORMAL
EOSINOPHIL # BLD: 0.1 K/UL (ref 0–0.8)
EOSINOPHIL NFR BLD: 1 % (ref 0.5–7.8)
EPI CELLS #/AREA URNS HPF: ABNORMAL /HPF
ERYTHROCYTE [DISTWIDTH] IN BLOOD BY AUTOMATED COUNT: 25.1 % (ref 11.9–14.6)
GLOBULIN SER CALC-MCNC: 4.8 G/DL (ref 2.8–4.5)
GLUCOSE SERPL-MCNC: 113 MG/DL (ref 65–100)
GLUCOSE UR STRIP.AUTO-MCNC: NEGATIVE MG/DL
HCT VFR BLD AUTO: 29.2 % (ref 35.8–46.3)
HGB BLD-MCNC: 8.1 G/DL (ref 11.7–15.4)
HGB UR QL STRIP: ABNORMAL
IMM GRANULOCYTES # BLD AUTO: 0.1 K/UL (ref 0–0.5)
IMM GRANULOCYTES NFR BLD AUTO: 0 % (ref 0–5)
KETONES UR QL STRIP.AUTO: NEGATIVE MG/DL
LACTATE SERPL-SCNC: 1.5 MMOL/L (ref 0.4–2)
LEUKOCYTE ESTERASE UR QL STRIP.AUTO: NEGATIVE
LIPASE SERPL-CCNC: 80 U/L (ref 73–393)
LYMPHOCYTES # BLD: 1.9 K/UL (ref 0.5–4.6)
LYMPHOCYTES NFR BLD: 14 % (ref 13–44)
MCH RBC QN AUTO: 24.8 PG (ref 26.1–32.9)
MCHC RBC AUTO-ENTMCNC: 27.7 G/DL (ref 31.4–35)
MCV RBC AUTO: 89.6 FL (ref 82–102)
MONOCYTES # BLD: 1.4 K/UL (ref 0.1–1.3)
MONOCYTES NFR BLD: 10 % (ref 4–12)
MUCOUS THREADS URNS QL MICRO: 0 /LPF
NEUTS SEG # BLD: 10.2 K/UL (ref 1.7–8.2)
NEUTS SEG NFR BLD: 75 % (ref 43–78)
NITRITE UR QL STRIP.AUTO: NEGATIVE
NRBC # BLD: 0.08 K/UL (ref 0–0.2)
OTHER OBSERVATIONS: ABNORMAL
PH UR STRIP: 7 (ref 5–9)
PLATELET # BLD AUTO: 587 K/UL (ref 150–450)
PMV BLD AUTO: 11.6 FL (ref 9.4–12.3)
POTASSIUM SERPL-SCNC: 4.1 MMOL/L (ref 3.5–5.1)
PROT SERPL-MCNC: 6.9 G/DL (ref 6.3–8.2)
PROT UR STRIP-MCNC: 100 MG/DL
RBC # BLD AUTO: 3.26 M/UL (ref 4.05–5.2)
RBC #/AREA URNS HPF: 0 /HPF
SODIUM SERPL-SCNC: 143 MMOL/L (ref 133–143)
SP GR UR REFRACTOMETRY: >1.035 (ref 1–1.02)
URINE CULTURE IF INDICATED: ABNORMAL
UROBILINOGEN UR QL STRIP.AUTO: 1 EU/DL (ref 0.2–1)
WBC # BLD AUTO: 13.7 K/UL (ref 4.3–11.1)
WBC URNS QL MICRO: ABNORMAL /HPF

## 2023-07-12 PROCEDURE — 96361 HYDRATE IV INFUSION ADD-ON: CPT

## 2023-07-12 PROCEDURE — 80053 COMPREHEN METABOLIC PANEL: CPT

## 2023-07-12 PROCEDURE — 74177 CT ABD & PELVIS W/CONTRAST: CPT

## 2023-07-12 PROCEDURE — 2580000003 HC RX 258: Performed by: EMERGENCY MEDICINE

## 2023-07-12 PROCEDURE — A4216 STERILE WATER/SALINE, 10 ML: HCPCS | Performed by: EMERGENCY MEDICINE

## 2023-07-12 PROCEDURE — 96374 THER/PROPH/DIAG INJ IV PUSH: CPT

## 2023-07-12 PROCEDURE — 96376 TX/PRO/DX INJ SAME DRUG ADON: CPT

## 2023-07-12 PROCEDURE — 81001 URINALYSIS AUTO W/SCOPE: CPT

## 2023-07-12 PROCEDURE — 83690 ASSAY OF LIPASE: CPT

## 2023-07-12 PROCEDURE — 71045 X-RAY EXAM CHEST 1 VIEW: CPT

## 2023-07-12 PROCEDURE — C9113 INJ PANTOPRAZOLE SODIUM, VIA: HCPCS | Performed by: EMERGENCY MEDICINE

## 2023-07-12 PROCEDURE — 99285 EMERGENCY DEPT VISIT HI MDM: CPT

## 2023-07-12 PROCEDURE — 96375 TX/PRO/DX INJ NEW DRUG ADDON: CPT

## 2023-07-12 PROCEDURE — 6360000002 HC RX W HCPCS: Performed by: EMERGENCY MEDICINE

## 2023-07-12 PROCEDURE — 6360000004 HC RX CONTRAST MEDICATION: Performed by: EMERGENCY MEDICINE

## 2023-07-12 PROCEDURE — 85025 COMPLETE CBC W/AUTO DIFF WBC: CPT

## 2023-07-12 PROCEDURE — 83605 ASSAY OF LACTIC ACID: CPT

## 2023-07-12 RX ORDER — SODIUM CHLORIDE 0.9 % (FLUSH) 0.9 %
10 SYRINGE (ML) INJECTION
Status: COMPLETED | OUTPATIENT
Start: 2023-07-12 | End: 2023-07-12

## 2023-07-12 RX ORDER — MORPHINE SULFATE 4 MG/ML
4 INJECTION, SOLUTION INTRAMUSCULAR; INTRAVENOUS
Status: COMPLETED | OUTPATIENT
Start: 2023-07-12 | End: 2023-07-12

## 2023-07-12 RX ORDER — ONDANSETRON 2 MG/ML
4 INJECTION INTRAMUSCULAR; INTRAVENOUS
Status: COMPLETED | OUTPATIENT
Start: 2023-07-12 | End: 2023-07-12

## 2023-07-12 RX ORDER — ONDANSETRON 4 MG/1
8 TABLET, ORALLY DISINTEGRATING ORAL 3 TIMES DAILY PRN
Qty: 30 TABLET | Refills: 0 | Status: SHIPPED | OUTPATIENT
Start: 2023-07-12

## 2023-07-12 RX ORDER — 0.9 % SODIUM CHLORIDE 0.9 %
100 INTRAVENOUS SOLUTION INTRAVENOUS
Status: COMPLETED | OUTPATIENT
Start: 2023-07-12 | End: 2023-07-12

## 2023-07-12 RX ORDER — 0.9 % SODIUM CHLORIDE 0.9 %
1000 INTRAVENOUS SOLUTION INTRAVENOUS
Status: COMPLETED | OUTPATIENT
Start: 2023-07-12 | End: 2023-07-12

## 2023-07-12 RX ADMIN — SODIUM CHLORIDE 1000 ML: 9 INJECTION, SOLUTION INTRAVENOUS at 16:38

## 2023-07-12 RX ADMIN — ONDANSETRON 4 MG: 2 INJECTION INTRAMUSCULAR; INTRAVENOUS at 16:37

## 2023-07-12 RX ADMIN — ONDANSETRON 4 MG: 2 INJECTION INTRAMUSCULAR; INTRAVENOUS at 21:23

## 2023-07-12 RX ADMIN — SODIUM CHLORIDE, PRESERVATIVE FREE 10 ML: 5 INJECTION INTRAVENOUS at 17:42

## 2023-07-12 RX ADMIN — SODIUM CHLORIDE 100 ML: 9 INJECTION, SOLUTION INTRAVENOUS at 17:42

## 2023-07-12 RX ADMIN — MORPHINE SULFATE 4 MG: 4 INJECTION INTRAVENOUS at 16:37

## 2023-07-12 RX ADMIN — MORPHINE SULFATE 4 MG: 4 INJECTION INTRAVENOUS at 21:23

## 2023-07-12 RX ADMIN — IOPAMIDOL 100 ML: 755 INJECTION, SOLUTION INTRAVENOUS at 17:42

## 2023-07-12 RX ADMIN — PANTOPRAZOLE SODIUM 40 MG: 40 INJECTION, POWDER, FOR SOLUTION INTRAVENOUS at 18:03

## 2023-07-12 ASSESSMENT — PAIN SCALES - GENERAL: PAINLEVEL_OUTOF10: 7

## 2023-07-12 ASSESSMENT — ENCOUNTER SYMPTOMS
CONSTIPATION: 0
NAUSEA: 1
DIARRHEA: 0
RHINORRHEA: 0
SHORTNESS OF BREATH: 0
SORE THROAT: 0
COLOR CHANGE: 0
COUGH: 0
VOMITING: 1
BACK PAIN: 0
ABDOMINAL PAIN: 1

## 2023-07-12 ASSESSMENT — PAIN - FUNCTIONAL ASSESSMENT: PAIN_FUNCTIONAL_ASSESSMENT: 0-10

## 2023-07-12 NOTE — ED PROVIDER NOTES
46.3 %    MCV 89.6 82 - 102 FL    MCH 24.8 (L) 26.1 - 32.9 PG    MCHC 27.7 (L) 31.4 - 35.0 g/dL    RDW 25.1 (H) 11.9 - 14.6 %    Platelets 784 (H) 506 - 450 K/uL    MPV 11.6 9.4 - 12.3 FL    nRBC 0.08 0.0 - 0.2 K/uL    Differential Type AUTOMATED      Neutrophils % 75 43 - 78 %    Lymphocytes % 14 13 - 44 %    Monocytes % 10 4.0 - 12.0 %    Eosinophils % 1 0.5 - 7.8 %    Basophils % 0 0.0 - 2.0 %    Immature Granulocytes 0 0.0 - 5.0 %    Neutrophils Absolute 10.2 (H) 1.7 - 8.2 K/UL    Lymphocytes Absolute 1.9 0.5 - 4.6 K/UL    Monocytes Absolute 1.4 (H) 0.1 - 1.3 K/UL    Eosinophils Absolute 0.1 0.0 - 0.8 K/UL    Basophils Absolute 0.0 0.0 - 0.2 K/UL    Absolute Immature Granulocyte 0.1 0.0 - 0.5 K/UL   Comprehensive Metabolic Panel   Result Value Ref Range    Sodium 143 133 - 143 mmol/L    Potassium 4.1 3.5 - 5.1 mmol/L    Chloride 110 101 - 110 mmol/L    CO2 27 21 - 32 mmol/L    Anion Gap 6 2 - 11 mmol/L    Glucose 113 (H) 65 - 100 mg/dL    BUN 22 6 - 23 MG/DL    Creatinine 0.50 (L) 0.6 - 1.0 MG/DL    Est, Glom Filt Rate >60 >60 ml/min/1.73m2    Calcium 9.0 8.3 - 10.4 MG/DL    Total Bilirubin 0.5 0.2 - 1.1 MG/DL     (H) 12 - 65 U/L     (H) 15 - 37 U/L    Alk Phosphatase 266 (H) 50 - 136 U/L    Total Protein 6.9 6.3 - 8.2 g/dL    Albumin 2.1 (L) 3.5 - 5.0 g/dL    Globulin 4.8 (H) 2.8 - 4.5 g/dL    Albumin/Globulin Ratio 0.4 0.4 - 1.6     Urinalysis with Reflex to Culture    Specimen: Urine   Result Value Ref Range    Color, UA YELLOW/STRAW      Appearance CLEAR      Specific Gravity, UA >1.035 (H) 1.001 - 1.023    pH, Urine 7.0 5.0 - 9.0      Protein,  (A) NEG mg/dL    Glucose, UA Negative mg/dL    Ketones, Urine Negative NEG mg/dL    Bilirubin Urine Negative NEG      Blood, Urine SMALL (A) NEG      Urobilinogen, Urine 1.0 0.2 - 1.0 EU/dL    Nitrite, Urine Negative NEG      Leukocyte Esterase, Urine Negative NEG      WBC, UA 5-10 0 /hpf    RBC, UA 0 0 /hpf    BACTERIA, URINE TRACE 0 /hpf

## 2023-07-12 NOTE — ED TRIAGE NOTES
Patient reports vomiting and abd pain x2 weeks, reports worse today. Patient reports GI cancer receiving chemo, reports last chemo in June.

## 2023-07-13 ENCOUNTER — TRANSCRIBE ORDERS (OUTPATIENT)
Dept: SCHEDULING | Age: 48
End: 2023-07-13

## 2023-07-13 ENCOUNTER — HOSPITAL ENCOUNTER (OUTPATIENT)
Dept: INFUSION THERAPY | Age: 48
Discharge: HOME | End: 2023-07-13

## 2023-07-13 ENCOUNTER — OFFICE VISIT (OUTPATIENT)
Dept: PALLATIVE CARE | Age: 48
End: 2023-07-13

## 2023-07-13 ENCOUNTER — TELEPHONE (OUTPATIENT)
Dept: RADIATION ONCOLOGY | Age: 48
End: 2023-07-13

## 2023-07-13 ENCOUNTER — OFFICE VISIT (OUTPATIENT)
Dept: ONCOLOGY | Age: 48
End: 2023-07-13

## 2023-07-13 VITALS
HEART RATE: 102 BPM | TEMPERATURE: 98.3 F | RESPIRATION RATE: 16 BRPM | WEIGHT: 131.9 LBS | OXYGEN SATURATION: 99 % | DIASTOLIC BLOOD PRESSURE: 86 MMHG | BODY MASS INDEX: 22.64 KG/M2 | SYSTOLIC BLOOD PRESSURE: 114 MMHG

## 2023-07-13 DIAGNOSIS — C80.1 METASTASIS TO PERITONEUM OF UNKNOWN PRIMARY (HCC): Primary | ICD-10-CM

## 2023-07-13 DIAGNOSIS — C80.1 CARCINOMA OF UNKNOWN PRIMARY (HCC): ICD-10-CM

## 2023-07-13 DIAGNOSIS — T45.1X5A CHEMOTHERAPY-INDUCED NEUROPATHY (HCC): ICD-10-CM

## 2023-07-13 DIAGNOSIS — C78.6 METASTASIS TO PERITONEUM OF UNKNOWN PRIMARY (HCC): Primary | ICD-10-CM

## 2023-07-13 DIAGNOSIS — G62.0 CHEMOTHERAPY-INDUCED NEUROPATHY (HCC): ICD-10-CM

## 2023-07-13 DIAGNOSIS — R12 HEARTBURN: ICD-10-CM

## 2023-07-13 DIAGNOSIS — Z00.8 NUTRITIONAL ASSESSMENT: Primary | ICD-10-CM

## 2023-07-13 DIAGNOSIS — R11.10 REGURGITATION OF FOOD: ICD-10-CM

## 2023-07-13 DIAGNOSIS — K20.90 ESOPHAGITIS: ICD-10-CM

## 2023-07-13 DIAGNOSIS — C78.6 PERITONEAL CARCINOMATOSIS (HCC): ICD-10-CM

## 2023-07-13 DIAGNOSIS — Z78.9 ON TOTAL PARENTERAL NUTRITION (TPN): ICD-10-CM

## 2023-07-13 DIAGNOSIS — R79.89 ELEVATED LFTS: ICD-10-CM

## 2023-07-13 DIAGNOSIS — G89.3 CANCER ASSOCIATED PAIN: ICD-10-CM

## 2023-07-13 DIAGNOSIS — R11.2 NAUSEA AND VOMITING, UNSPECIFIED VOMITING TYPE: Primary | ICD-10-CM

## 2023-07-13 DIAGNOSIS — R53.0 NEOPLASTIC MALIGNANT RELATED FATIGUE: ICD-10-CM

## 2023-07-13 DIAGNOSIS — G89.3 CANCER RELATED PAIN: Primary | ICD-10-CM

## 2023-07-13 DIAGNOSIS — Z51.5 ENCOUNTER FOR PALLIATIVE CARE: ICD-10-CM

## 2023-07-13 DIAGNOSIS — R11.11 VOMITING WITHOUT NAUSEA, UNSPECIFIED VOMITING TYPE: ICD-10-CM

## 2023-07-13 LAB
ALBUMIN SERPL-MCNC: 2 G/DL (ref 3.5–5)
ALBUMIN/GLOB SERPL: 0.5 (ref 0.4–1.6)
ALP SERPL-CCNC: 260 U/L (ref 50–136)
ALT SERPL-CCNC: 185 U/L (ref 12–65)
ANION GAP SERPL CALC-SCNC: 5 MMOL/L (ref 2–11)
AST SERPL-CCNC: 98 U/L (ref 15–37)
BASOPHILS # BLD: 0 K/UL (ref 0–0.2)
BASOPHILS NFR BLD: 0 % (ref 0–2)
BILIRUB SERPL-MCNC: 0.4 MG/DL (ref 0.2–1.1)
BUN SERPL-MCNC: 26 MG/DL (ref 6–23)
CALCIUM SERPL-MCNC: 8.5 MG/DL (ref 8.3–10.4)
CHLORIDE SERPL-SCNC: 111 MMOL/L (ref 101–110)
CO2 SERPL-SCNC: 28 MMOL/L (ref 21–32)
CREAT SERPL-MCNC: 0.4 MG/DL (ref 0.6–1)
DIFFERENTIAL METHOD BLD: ABNORMAL
EOSINOPHIL # BLD: 0.1 K/UL (ref 0–0.8)
EOSINOPHIL NFR BLD: 1 % (ref 0.5–7.8)
ERYTHROCYTE [DISTWIDTH] IN BLOOD BY AUTOMATED COUNT: 24.6 % (ref 11.9–14.6)
GLOBULIN SER CALC-MCNC: 4.3 G/DL (ref 2.8–4.5)
GLUCOSE SERPL-MCNC: 111 MG/DL (ref 65–100)
HCG SERPL-ACNC: 7 MIU/ML (ref 0–6)
HCT VFR BLD AUTO: 27.6 % (ref 35.8–46.3)
HGB BLD-MCNC: 7.6 G/DL (ref 11.7–15.4)
IMM GRANULOCYTES # BLD AUTO: 0.1 K/UL (ref 0–0.5)
IMM GRANULOCYTES NFR BLD AUTO: 1 % (ref 0–5)
LYMPHOCYTES # BLD: 1.4 K/UL (ref 0.5–4.6)
LYMPHOCYTES NFR BLD: 10 % (ref 13–44)
MAGNESIUM SERPL-MCNC: 2.5 MG/DL (ref 1.8–2.4)
MCH RBC QN AUTO: 25 PG (ref 26.1–32.9)
MCHC RBC AUTO-ENTMCNC: 27.5 G/DL (ref 31.4–35)
MCV RBC AUTO: 90.8 FL (ref 82–102)
MONOCYTES # BLD: 1.4 K/UL (ref 0.1–1.3)
MONOCYTES NFR BLD: 10 % (ref 4–12)
NEUTS SEG # BLD: 10.8 K/UL (ref 1.7–8.2)
NEUTS SEG NFR BLD: 78 % (ref 43–78)
NRBC # BLD: 0.1 K/UL (ref 0–0.2)
PLATELET # BLD AUTO: 554 K/UL (ref 150–450)
PMV BLD AUTO: 11.1 FL (ref 9.4–12.3)
POTASSIUM SERPL-SCNC: 4.4 MMOL/L (ref 3.5–5.1)
PROT SERPL-MCNC: 6.3 G/DL (ref 6.3–8.2)
RBC # BLD AUTO: 3.04 M/UL (ref 4.05–5.2)
SODIUM SERPL-SCNC: 144 MMOL/L (ref 133–143)
WBC # BLD AUTO: 13.8 K/UL (ref 4.3–11.1)

## 2023-07-13 PROCEDURE — 6360000002 HC RX W HCPCS: Performed by: NURSE PRACTITIONER

## 2023-07-13 PROCEDURE — A4216 STERILE WATER/SALINE, 10 ML: HCPCS | Performed by: NURSE PRACTITIONER

## 2023-07-13 PROCEDURE — 2500000003 HC RX 250 WO HCPCS: Performed by: NURSE PRACTITIONER

## 2023-07-13 PROCEDURE — 2580000003 HC RX 258: Performed by: NURSE PRACTITIONER

## 2023-07-13 PROCEDURE — 83735 ASSAY OF MAGNESIUM: CPT

## 2023-07-13 PROCEDURE — 80053 COMPREHEN METABOLIC PANEL: CPT

## 2023-07-13 PROCEDURE — 99215 OFFICE O/P EST HI 40 MIN: CPT | Performed by: NURSE PRACTITIONER

## 2023-07-13 PROCEDURE — 96413 CHEMO IV INFUSION 1 HR: CPT

## 2023-07-13 PROCEDURE — 2580000003 HC RX 258: Performed by: INTERNAL MEDICINE

## 2023-07-13 PROCEDURE — 84702 CHORIONIC GONADOTROPIN TEST: CPT

## 2023-07-13 PROCEDURE — 96375 TX/PRO/DX INJ NEW DRUG ADDON: CPT

## 2023-07-13 PROCEDURE — 36591 DRAW BLOOD OFF VENOUS DEVICE: CPT

## 2023-07-13 PROCEDURE — 85025 COMPLETE CBC W/AUTO DIFF WBC: CPT

## 2023-07-13 RX ORDER — SODIUM CHLORIDE 9 MG/ML
5-250 INJECTION, SOLUTION INTRAVENOUS PRN
Status: CANCELLED | OUTPATIENT
Start: 2023-07-13

## 2023-07-13 RX ORDER — 0.9 % SODIUM CHLORIDE 0.9 %
1000 INTRAVENOUS SOLUTION INTRAVENOUS ONCE
Status: COMPLETED | OUTPATIENT
Start: 2023-07-13 | End: 2023-07-13

## 2023-07-13 RX ORDER — ONDANSETRON 2 MG/ML
8 INJECTION INTRAMUSCULAR; INTRAVENOUS
Status: DISCONTINUED | OUTPATIENT
Start: 2023-07-13 | End: 2023-07-14 | Stop reason: HOSPADM

## 2023-07-13 RX ORDER — ONDANSETRON 2 MG/ML
8 INJECTION INTRAMUSCULAR; INTRAVENOUS ONCE
Status: CANCELLED | OUTPATIENT
Start: 2023-07-13 | End: 2023-07-13

## 2023-07-13 RX ORDER — EPINEPHRINE 1 MG/ML
0.3 INJECTION, SOLUTION, CONCENTRATE INTRAVENOUS PRN
Status: CANCELLED | OUTPATIENT
Start: 2023-07-13

## 2023-07-13 RX ORDER — 0.9 % SODIUM CHLORIDE 0.9 %
1000 INTRAVENOUS SOLUTION INTRAVENOUS ONCE
Status: CANCELLED
Start: 2023-07-13

## 2023-07-13 RX ORDER — DIPHENHYDRAMINE HYDROCHLORIDE 50 MG/ML
50 INJECTION INTRAMUSCULAR; INTRAVENOUS ONCE
Status: COMPLETED | OUTPATIENT
Start: 2023-07-13 | End: 2023-07-13

## 2023-07-13 RX ORDER — SODIUM CHLORIDE 0.9 % (FLUSH) 0.9 %
5-40 SYRINGE (ML) INJECTION PRN
Status: DISCONTINUED | OUTPATIENT
Start: 2023-07-13 | End: 2023-07-14 | Stop reason: HOSPADM

## 2023-07-13 RX ORDER — FAMOTIDINE 10 MG/ML
20 INJECTION, SOLUTION INTRAVENOUS
Status: CANCELLED | OUTPATIENT
Start: 2023-07-13

## 2023-07-13 RX ORDER — FENTANYL 12 UG/H
1 PATCH TRANSDERMAL
Qty: 5 PATCH | Refills: 0 | Status: SHIPPED | OUTPATIENT
Start: 2023-07-13 | End: 2023-07-28

## 2023-07-13 RX ORDER — DEXAMETHASONE SODIUM PHOSPHATE 10 MG/ML
10 INJECTION INTRAMUSCULAR; INTRAVENOUS ONCE
Status: COMPLETED | OUTPATIENT
Start: 2023-07-13 | End: 2023-07-13

## 2023-07-13 RX ORDER — ONDANSETRON 2 MG/ML
8 INJECTION INTRAMUSCULAR; INTRAVENOUS
Status: CANCELLED | OUTPATIENT
Start: 2023-07-13

## 2023-07-13 RX ORDER — EPINEPHRINE 1 MG/ML
0.3 INJECTION, SOLUTION, CONCENTRATE INTRAVENOUS PRN
Status: DISCONTINUED | OUTPATIENT
Start: 2023-07-13 | End: 2023-07-14 | Stop reason: HOSPADM

## 2023-07-13 RX ORDER — MEPERIDINE HYDROCHLORIDE 25 MG/ML
12.5 INJECTION INTRAMUSCULAR; INTRAVENOUS; SUBCUTANEOUS PRN
Status: DISCONTINUED | OUTPATIENT
Start: 2023-07-13 | End: 2023-07-14 | Stop reason: HOSPADM

## 2023-07-13 RX ORDER — MORPHINE SULFATE 2 MG/ML
2 INJECTION, SOLUTION INTRAMUSCULAR; INTRAVENOUS ONCE
Status: CANCELLED
Start: 2023-07-13 | End: 2023-07-13

## 2023-07-13 RX ORDER — SODIUM CHLORIDE 0.9 % (FLUSH) 0.9 %
5-40 SYRINGE (ML) INJECTION PRN
Status: CANCELLED | OUTPATIENT
Start: 2023-07-13

## 2023-07-13 RX ORDER — SODIUM CHLORIDE 9 MG/ML
INJECTION, SOLUTION INTRAVENOUS CONTINUOUS
Status: DISCONTINUED | OUTPATIENT
Start: 2023-07-13 | End: 2023-07-14 | Stop reason: HOSPADM

## 2023-07-13 RX ORDER — DIPHENHYDRAMINE HYDROCHLORIDE 50 MG/ML
50 INJECTION INTRAMUSCULAR; INTRAVENOUS
Status: CANCELLED | OUTPATIENT
Start: 2023-07-13

## 2023-07-13 RX ORDER — ACETAMINOPHEN 325 MG/1
650 TABLET ORAL
Status: CANCELLED | OUTPATIENT
Start: 2023-07-13

## 2023-07-13 RX ORDER — DIPHENHYDRAMINE HYDROCHLORIDE 50 MG/ML
50 INJECTION INTRAMUSCULAR; INTRAVENOUS ONCE
Status: CANCELLED | OUTPATIENT
Start: 2023-07-13 | End: 2023-07-13

## 2023-07-13 RX ORDER — ONDANSETRON 2 MG/ML
8 INJECTION INTRAMUSCULAR; INTRAVENOUS ONCE
Status: COMPLETED | OUTPATIENT
Start: 2023-07-13 | End: 2023-07-13

## 2023-07-13 RX ORDER — SODIUM CHLORIDE 0.9 % (FLUSH) 0.9 %
10 SYRINGE (ML) INJECTION PRN
Status: DISCONTINUED | OUTPATIENT
Start: 2023-07-13 | End: 2023-07-14 | Stop reason: HOSPADM

## 2023-07-13 RX ORDER — HEPARIN SODIUM (PORCINE) LOCK FLUSH IV SOLN 100 UNIT/ML 100 UNIT/ML
500 SOLUTION INTRAVENOUS PRN
Status: CANCELLED | OUTPATIENT
Start: 2023-07-13

## 2023-07-13 RX ORDER — MORPHINE SULFATE 2 MG/ML
2 INJECTION, SOLUTION INTRAMUSCULAR; INTRAVENOUS ONCE
Status: COMPLETED | OUTPATIENT
Start: 2023-07-13 | End: 2023-07-13

## 2023-07-13 RX ORDER — ACETAMINOPHEN 325 MG/1
650 TABLET ORAL
Status: DISCONTINUED | OUTPATIENT
Start: 2023-07-13 | End: 2023-07-14 | Stop reason: HOSPADM

## 2023-07-13 RX ORDER — MEPERIDINE HYDROCHLORIDE 50 MG/ML
12.5 INJECTION INTRAMUSCULAR; INTRAVENOUS; SUBCUTANEOUS PRN
Status: CANCELLED | OUTPATIENT
Start: 2023-07-13

## 2023-07-13 RX ORDER — ALBUTEROL SULFATE 90 UG/1
4 AEROSOL, METERED RESPIRATORY (INHALATION) PRN
Status: CANCELLED | OUTPATIENT
Start: 2023-07-13

## 2023-07-13 RX ORDER — ALBUTEROL SULFATE 90 UG/1
4 AEROSOL, METERED RESPIRATORY (INHALATION) PRN
Status: DISCONTINUED | OUTPATIENT
Start: 2023-07-13 | End: 2023-07-14 | Stop reason: HOSPADM

## 2023-07-13 RX ORDER — FAMOTIDINE 10 MG/ML
20 INJECTION, SOLUTION INTRAVENOUS ONCE
Status: CANCELLED | OUTPATIENT
Start: 2023-07-13 | End: 2023-07-13

## 2023-07-13 RX ORDER — DIPHENHYDRAMINE HYDROCHLORIDE 50 MG/ML
50 INJECTION INTRAMUSCULAR; INTRAVENOUS
Status: DISCONTINUED | OUTPATIENT
Start: 2023-07-13 | End: 2023-07-14 | Stop reason: HOSPADM

## 2023-07-13 RX ORDER — SODIUM CHLORIDE 9 MG/ML
INJECTION, SOLUTION INTRAVENOUS CONTINUOUS
Status: CANCELLED | OUTPATIENT
Start: 2023-07-13

## 2023-07-13 RX ADMIN — ONDANSETRON 8 MG: 2 INJECTION INTRAMUSCULAR; INTRAVENOUS at 14:16

## 2023-07-13 RX ADMIN — DIPHENHYDRAMINE HYDROCHLORIDE 50 MG: 50 INJECTION, SOLUTION INTRAMUSCULAR; INTRAVENOUS at 14:20

## 2023-07-13 RX ADMIN — PACLITAXEL 108 MG: 6 INJECTION, SOLUTION, CONCENTRATE INTRAVENOUS at 14:55

## 2023-07-13 RX ADMIN — DEXAMETHASONE SODIUM PHOSPHATE 10 MG: 10 INJECTION INTRAMUSCULAR; INTRAVENOUS at 14:22

## 2023-07-13 RX ADMIN — SODIUM CHLORIDE, PRESERVATIVE FREE 10 ML: 5 INJECTION INTRAVENOUS at 13:54

## 2023-07-13 RX ADMIN — SODIUM CHLORIDE 1000 ML: 9 INJECTION, SOLUTION INTRAVENOUS at 13:54

## 2023-07-13 RX ADMIN — MORPHINE SULFATE 2 MG: 2 INJECTION, SOLUTION INTRAMUSCULAR; INTRAVENOUS at 14:10

## 2023-07-13 RX ADMIN — FAMOTIDINE 20 MG: 10 INJECTION, SOLUTION INTRAVENOUS at 14:18

## 2023-07-13 RX ADMIN — SODIUM CHLORIDE, PRESERVATIVE FREE 10 ML: 5 INJECTION INTRAVENOUS at 11:49

## 2023-07-13 ASSESSMENT — PAIN DESCRIPTION - LOCATION: LOCATION: ABDOMEN

## 2023-07-13 ASSESSMENT — PATIENT HEALTH QUESTIONNAIRE - PHQ9
8. MOVING OR SPEAKING SO SLOWLY THAT OTHER PEOPLE COULD HAVE NOTICED. OR THE OPPOSITE, BEING SO FIGETY OR RESTLESS THAT YOU HAVE BEEN MOVING AROUND A LOT MORE THAN USUAL: 0
4. FEELING TIRED OR HAVING LITTLE ENERGY: 2
6. FEELING BAD ABOUT YOURSELF - OR THAT YOU ARE A FAILURE OR HAVE LET YOURSELF OR YOUR FAMILY DOWN: 0
SUM OF ALL RESPONSES TO PHQ9 QUESTIONS 1 & 2: 0
SUM OF ALL RESPONSES TO PHQ QUESTIONS 1-9: 6
3. TROUBLE FALLING OR STAYING ASLEEP: 2
SUM OF ALL RESPONSES TO PHQ QUESTIONS 1-9: 6
SUM OF ALL RESPONSES TO PHQ QUESTIONS 1-9: 6
10. IF YOU CHECKED OFF ANY PROBLEMS, HOW DIFFICULT HAVE THESE PROBLEMS MADE IT FOR YOU TO DO YOUR WORK, TAKE CARE OF THINGS AT HOME, OR GET ALONG WITH OTHER PEOPLE: 0
SUM OF ALL RESPONSES TO PHQ QUESTIONS 1-9: 6
2. FEELING DOWN, DEPRESSED OR HOPELESS: 0
9. THOUGHTS THAT YOU WOULD BE BETTER OFF DEAD, OR OF HURTING YOURSELF: 0
5. POOR APPETITE OR OVEREATING: 2
1. LITTLE INTEREST OR PLEASURE IN DOING THINGS: 0
7. TROUBLE CONCENTRATING ON THINGS, SUCH AS READING THE NEWSPAPER OR WATCHING TELEVISION: 0

## 2023-07-13 ASSESSMENT — ANXIETY QUESTIONNAIRES
3. WORRYING TOO MUCH ABOUT DIFFERENT THINGS: 0
2. NOT BEING ABLE TO STOP OR CONTROL WORRYING: 0
1. FEELING NERVOUS, ANXIOUS, OR ON EDGE: 0
IF YOU CHECKED OFF ANY PROBLEMS ON THIS QUESTIONNAIRE, HOW DIFFICULT HAVE THESE PROBLEMS MADE IT FOR YOU TO DO YOUR WORK, TAKE CARE OF THINGS AT HOME, OR GET ALONG WITH OTHER PEOPLE: NOT DIFFICULT AT ALL
7. FEELING AFRAID AS IF SOMETHING AWFUL MIGHT HAPPEN: 0
5. BEING SO RESTLESS THAT IT IS HARD TO SIT STILL: 0
GAD7 TOTAL SCORE: 0
4. TROUBLE RELAXING: 0
6. BECOMING EASILY ANNOYED OR IRRITABLE: 0

## 2023-07-13 ASSESSMENT — PAIN DESCRIPTION - ORIENTATION: ORIENTATION: MID;LEFT;RIGHT

## 2023-07-13 ASSESSMENT — ENCOUNTER SYMPTOMS
RESPIRATORY NEGATIVE: 1
ALLERGIC/IMMUNOLOGIC NEGATIVE: 1
EYES NEGATIVE: 1
VOMITING: 1
ABDOMINAL PAIN: 0
NAUSEA: 0

## 2023-07-13 ASSESSMENT — PAIN SCALES - GENERAL: PAINLEVEL_OUTOF10: 8

## 2023-07-13 NOTE — PROGRESS NOTES
Outpatient Palliative Care at the  [facility]: Office Visit Established Patient    Diagnosis: metastatic carcinoma of unknown primary    Treatment Plan: FOLFOX + Avastin--> Taxol/Cyramza    Treatment Intent: Palliative    Medical Oncologist: Dr. America Lanier Oncologist: N/A    Navigator: Sofy Manning RN    Chief Complaint:    Chief Complaint   Patient presents with    Follow-up     Pain  Intractable n/v     History of Present Illness:  Ms. Richard Matos is a 50 y.o. female who presents today for evaluation regarding introduction to palliative care. Patient initially presented with back pain, difficulty swallowing in January 2022. A CT on 1/27/22 showed soft tissue stranding throughout retroperitoneum, wall thickening of colon, and ascites. On 2/1/22, Dr. Abena Vines performed laparoscopy and biopsy revealing peritoneal carcinomatosis; biopsy showed poorly differentiated metastatic carcinoma potentially consistent with upper GI primary. PET negative for activity in upper GI tract. She had bilateral ureteral stent placement and bilateral salpingo-oophorectomies on 2/15/22- biopsy from that surgery showed poorly differentiated carcinoma with occasional signet ring features. She is followed by Dr. Serge Magdaleno and began FOLFOX in April 2022. Late October, she was hospitalized for dysphagia, intractable vomiting due to peritoneal carcinomatosis. She was started on TPN. She subsequently began Taxol/Cyramza. Patient lives with her boyfriend and 71MI daughter, Malathi Saucedo. She lives 2 miles from her mom. Her dad is not involved in her life and she nor her mom know how to contact her dad. Interval History:  Patient seen in clinic in coordination with her office visit with PORSHA Shore. She had CT scan on 5/30/23 that showed stable disease. Patient has intractable vomiting, typically not associated with nausea. Is not able to swallow her saliva and is spitting it out throughotu visit.   Last month, she

## 2023-07-13 NOTE — PROGRESS NOTES
Arrived to the [address] [facility]. 1L IVF, Morphine, and Taxol completed. Patient tolerated well. Any issues or concerns during appointment: no.  Patient aware of next infusion appointment on 7/20/23 (date) at 1 (time). Patient aware of next lab and [facility] office visit on 7/27/23 (date) at 80 (time). Patient instructed to call provider with temperature of 100.4 or greater or nausea/vomiting/ diarrhea or pain not controlled by medications  Discharged via wheelchair.

## 2023-07-13 NOTE — PROGRESS NOTES
[facility] Hematology and Oncology: Office Visit Established Patient    Reason for follow up:    Eddi Miramontes  is seen in follow-up for carcinoma of unknown primary. Overview: (copied from prior)  Ms. Shin Aguirre was seen for the first time in our office in February 2022. She was a woman of previously good health who began noticing left-sided back discomfort in early January 2022. This was associated  ultimately with a sense of difficulty swallowing and ultimately she presented to [facility] for evaluation. Her symptoms were felt to potentially be indicative of a bowel obstruction and she was sent for a CT scan. This study, performed on January 27, 2022  was notable for a linear calcific density along the course of the proximal left ureter with associated moderate hydronephrosis potentially indicative of an intraureteral calculus. There was also stranding throughout the retroperitoneal soft tissues anterior  to the left psoas muscle with pelvic ascites. There was also wall thickening involving the descending colon. The question of colitis or serosal implants was raised. The patient had undergone a gastric sleeve placement several years prior. She had  also undergone a negative colonoscopy about 3 years prior to these presentations. There was a history of anemia ultimately resulting in the performance of a hysterectomy approximately 3 years ago for menorrhagia. Because of the CT scan findings, she  was ultimately referred to Dr. Damaris Ramirez for evaluation of a possible pelvic malignancy. On February 1, 2022 he performed a laparoscopy and biopsy with evidence of peritoneal carcinomatosis grossly. A \"peritoneal nodule\" and a \"peritoneal implant\"  were both positive for a poorly differentiated metastatic carcinoma potentially consistent with an upper gastrointestinal primary. An omental biopsy showed no evidence of malignancy.   Immunohistochemistries were positive for cytokeratin 7 and negative  for

## 2023-07-13 NOTE — TELEPHONE ENCOUNTER
Spoke with Sherron Marks with [facility]. She is requesting a return call re: ;an order she had faxed over for Dr. Eze Araujo.  Please have someone return her call at [phone]

## 2023-07-13 NOTE — PROGRESS NOTES
Patient arrived to port lab by wheelchair for port access and lab draw. Port accessed and labs drawn per protocol. *Port needle changed. Port remains accessed. Patient discharged from port lab by wheelchair.

## 2023-07-18 NOTE — PROGRESS NOTES
Nutrition F/U:  Assessment:  Pt seen during office visit w/ NP, receiving Taxol alone today d/t persistent elevated LFT's - Dr. Tevin Otto discussed w/ Dr. Alyce [facility]; pt still w/ erosive esophagitis, unable to tolerate her own secretions, nausea persists from secretions - Dr. Alyce Correa discussed trying to get a Reglan pump for pt as she poorly tolerates any of her oral meds to control these symptoms. Pt remains NPO/TPN dependent for estimated nutrition needs, managed by [facility], infusing 12 hrs/day - Dr. Alyce Correa aware of TPN supplier and infusion time for Reglan pump if able to provide. Pt declines home suction machine, Fentanyl patch (12.5 mcg) added today Labs notable for Na (144), Cl (111), BUN (26), Cr (0.4), magnesium (2.5), Alk Phos (260), ALT (185), AST (98). Current BW: 131#, up 4# over the past 2 weeks. Intervention:  1. NPO  2. TPN per [employer], Dr. Alyce Correa trying to obtain a Reglan pump for patient   3. Fentanyl patch (12.5 mcg) added today     Monitoring/Evaluation:  1. RD to follow up during next office visit - follow up wt status, nutrition related lab values w/ TPN, symptom management.       [address] [place] [address]

## 2023-07-20 ENCOUNTER — HOSPITAL ENCOUNTER (OUTPATIENT)
Dept: INFUSION THERAPY | Age: 48
Discharge: HOME | End: 2023-07-20

## 2023-07-20 VITALS
RESPIRATION RATE: 16 BRPM | BODY MASS INDEX: 22.45 KG/M2 | OXYGEN SATURATION: 98 % | HEART RATE: 108 BPM | SYSTOLIC BLOOD PRESSURE: 137 MMHG | WEIGHT: 130.8 LBS | TEMPERATURE: 99.1 F | DIASTOLIC BLOOD PRESSURE: 88 MMHG

## 2023-07-20 DIAGNOSIS — C78.6 METASTASIS TO PERITONEUM OF UNKNOWN PRIMARY (HCC): Primary | ICD-10-CM

## 2023-07-20 DIAGNOSIS — C80.1 METASTASIS TO PERITONEUM OF UNKNOWN PRIMARY (HCC): Primary | ICD-10-CM

## 2023-07-20 DIAGNOSIS — C80.1 CARCINOMA OF UNKNOWN PRIMARY (HCC): ICD-10-CM

## 2023-07-20 LAB
ALBUMIN SERPL-MCNC: 2.2 G/DL (ref 3.5–5)
ALBUMIN/GLOB SERPL: 0.5 (ref 0.4–1.6)
ALP SERPL-CCNC: 258 U/L (ref 50–136)
ALT SERPL-CCNC: 173 U/L (ref 12–65)
ANION GAP SERPL CALC-SCNC: 6 MMOL/L (ref 2–11)
AST SERPL-CCNC: 119 U/L (ref 15–37)
BASOPHILS # BLD: 0 K/UL (ref 0–0.2)
BASOPHILS NFR BLD: 0 % (ref 0–2)
BILIRUB SERPL-MCNC: 0.5 MG/DL (ref 0.2–1.1)
BUN SERPL-MCNC: 25 MG/DL (ref 6–23)
CALCIUM SERPL-MCNC: 8.8 MG/DL (ref 8.3–10.4)
CHLORIDE SERPL-SCNC: 113 MMOL/L (ref 101–110)
CO2 SERPL-SCNC: 29 MMOL/L (ref 21–32)
CREAT SERPL-MCNC: 0.5 MG/DL (ref 0.6–1)
DIFFERENTIAL METHOD BLD: ABNORMAL
EOSINOPHIL # BLD: 0 K/UL (ref 0–0.8)
EOSINOPHIL NFR BLD: 0 % (ref 0.5–7.8)
ERYTHROCYTE [DISTWIDTH] IN BLOOD BY AUTOMATED COUNT: 24 % (ref 11.9–14.6)
GLOBULIN SER CALC-MCNC: 4.4 G/DL (ref 2.8–4.5)
GLUCOSE SERPL-MCNC: 131 MG/DL (ref 65–100)
HCT VFR BLD AUTO: 26.5 % (ref 35.8–46.3)
HGB BLD-MCNC: 7.4 G/DL (ref 11.7–15.4)
IMM GRANULOCYTES # BLD AUTO: 0.2 K/UL (ref 0–0.5)
IMM GRANULOCYTES NFR BLD AUTO: 3 % (ref 0–5)
LYMPHOCYTES # BLD: 1.6 K/UL (ref 0.5–4.6)
LYMPHOCYTES NFR BLD: 23 % (ref 13–44)
MCH RBC QN AUTO: 25.2 PG (ref 26.1–32.9)
MCHC RBC AUTO-ENTMCNC: 27.9 G/DL (ref 31.4–35)
MCV RBC AUTO: 90.1 FL (ref 82–102)
MONOCYTES # BLD: 0.5 K/UL (ref 0.1–1.3)
MONOCYTES NFR BLD: 7 % (ref 4–12)
NEUTS SEG # BLD: 4.6 K/UL (ref 1.7–8.2)
NEUTS SEG NFR BLD: 67 % (ref 43–78)
NRBC # BLD: 0.99 K/UL (ref 0–0.2)
PLATELET # BLD AUTO: 480 K/UL (ref 150–450)
PMV BLD AUTO: 11.5 FL (ref 9.4–12.3)
POTASSIUM SERPL-SCNC: 4.1 MMOL/L (ref 3.5–5.1)
PROT SERPL-MCNC: 6.6 G/DL (ref 6.3–8.2)
RBC # BLD AUTO: 2.94 M/UL (ref 4.05–5.2)
SODIUM SERPL-SCNC: 148 MMOL/L (ref 133–143)
WBC # BLD AUTO: 6.9 K/UL (ref 4.3–11.1)

## 2023-07-20 PROCEDURE — 85025 COMPLETE CBC W/AUTO DIFF WBC: CPT

## 2023-07-20 PROCEDURE — 96375 TX/PRO/DX INJ NEW DRUG ADDON: CPT

## 2023-07-20 PROCEDURE — 96413 CHEMO IV INFUSION 1 HR: CPT

## 2023-07-20 PROCEDURE — 2580000003 HC RX 258: Performed by: NURSE PRACTITIONER

## 2023-07-20 PROCEDURE — A4216 STERILE WATER/SALINE, 10 ML: HCPCS | Performed by: NURSE PRACTITIONER

## 2023-07-20 PROCEDURE — 6360000002 HC RX W HCPCS: Performed by: NURSE PRACTITIONER

## 2023-07-20 PROCEDURE — 80053 COMPREHEN METABOLIC PANEL: CPT

## 2023-07-20 PROCEDURE — 2500000003 HC RX 250 WO HCPCS: Performed by: NURSE PRACTITIONER

## 2023-07-20 RX ORDER — DEXAMETHASONE SODIUM PHOSPHATE 10 MG/ML
10 INJECTION INTRAMUSCULAR; INTRAVENOUS ONCE
Status: COMPLETED | OUTPATIENT
Start: 2023-07-20 | End: 2023-07-20

## 2023-07-20 RX ORDER — SODIUM CHLORIDE 9 MG/ML
5-250 INJECTION, SOLUTION INTRAVENOUS PRN
Status: DISCONTINUED | OUTPATIENT
Start: 2023-07-20 | End: 2023-07-21 | Stop reason: HOSPADM

## 2023-07-20 RX ORDER — SODIUM CHLORIDE 0.9 % (FLUSH) 0.9 %
5-40 SYRINGE (ML) INJECTION PRN
Status: DISCONTINUED | OUTPATIENT
Start: 2023-07-20 | End: 2023-07-21 | Stop reason: HOSPADM

## 2023-07-20 RX ORDER — ACETAMINOPHEN 325 MG/1
650 TABLET ORAL
Status: CANCELLED | OUTPATIENT
Start: 2023-07-20

## 2023-07-20 RX ORDER — SODIUM CHLORIDE 0.9 % (FLUSH) 0.9 %
5-40 SYRINGE (ML) INJECTION PRN
Status: CANCELLED | OUTPATIENT
Start: 2023-07-20

## 2023-07-20 RX ORDER — SODIUM CHLORIDE 9 MG/ML
5-250 INJECTION, SOLUTION INTRAVENOUS PRN
Status: CANCELLED | OUTPATIENT
Start: 2023-07-20

## 2023-07-20 RX ORDER — MEPERIDINE HYDROCHLORIDE 25 MG/ML
12.5 INJECTION INTRAMUSCULAR; INTRAVENOUS; SUBCUTANEOUS PRN
Status: CANCELLED | OUTPATIENT
Start: 2023-07-20

## 2023-07-20 RX ORDER — MORPHINE SULFATE 2 MG/ML
2 INJECTION, SOLUTION INTRAMUSCULAR; INTRAVENOUS ONCE
Status: COMPLETED | OUTPATIENT
Start: 2023-07-20 | End: 2023-07-20

## 2023-07-20 RX ORDER — ONDANSETRON 2 MG/ML
8 INJECTION INTRAMUSCULAR; INTRAVENOUS
Status: CANCELLED | OUTPATIENT
Start: 2023-07-20

## 2023-07-20 RX ORDER — DIPHENHYDRAMINE HYDROCHLORIDE 50 MG/ML
50 INJECTION INTRAMUSCULAR; INTRAVENOUS
Status: DISCONTINUED | OUTPATIENT
Start: 2023-07-20 | End: 2023-07-21 | Stop reason: HOSPADM

## 2023-07-20 RX ORDER — ONDANSETRON 2 MG/ML
8 INJECTION INTRAMUSCULAR; INTRAVENOUS ONCE
Status: COMPLETED | OUTPATIENT
Start: 2023-07-20 | End: 2023-07-20

## 2023-07-20 RX ORDER — EPINEPHRINE 1 MG/ML
0.3 INJECTION, SOLUTION, CONCENTRATE INTRAVENOUS PRN
Status: CANCELLED | OUTPATIENT
Start: 2023-07-20

## 2023-07-20 RX ORDER — HEPARIN 100 UNIT/ML
500 SYRINGE INTRAVENOUS PRN
Status: CANCELLED | OUTPATIENT
Start: 2023-07-20

## 2023-07-20 RX ORDER — DIPHENHYDRAMINE HYDROCHLORIDE 50 MG/ML
50 INJECTION INTRAMUSCULAR; INTRAVENOUS ONCE
Status: COMPLETED | OUTPATIENT
Start: 2023-07-20 | End: 2023-07-20

## 2023-07-20 RX ORDER — ALBUTEROL SULFATE 90 UG/1
4 AEROSOL, METERED RESPIRATORY (INHALATION) PRN
Status: CANCELLED | OUTPATIENT
Start: 2023-07-20

## 2023-07-20 RX ORDER — SODIUM CHLORIDE 9 MG/ML
INJECTION, SOLUTION INTRAVENOUS CONTINUOUS
Status: CANCELLED | OUTPATIENT
Start: 2023-07-20

## 2023-07-20 RX ADMIN — SODIUM CHLORIDE, PRESERVATIVE FREE 10 ML: 5 INJECTION INTRAVENOUS at 17:07

## 2023-07-20 RX ADMIN — PACLITAXEL 108 MG: 6 INJECTION, SOLUTION, CONCENTRATE INTRAVENOUS at 15:55

## 2023-07-20 RX ADMIN — DEXAMETHASONE SODIUM PHOSPHATE 10 MG: 10 INJECTION INTRAMUSCULAR; INTRAVENOUS at 15:07

## 2023-07-20 RX ADMIN — MORPHINE SULFATE 2 MG: 2 INJECTION, SOLUTION INTRAMUSCULAR; INTRAVENOUS at 15:50

## 2023-07-20 RX ADMIN — FAMOTIDINE 20 MG: 10 INJECTION, SOLUTION INTRAVENOUS at 15:04

## 2023-07-20 RX ADMIN — DIPHENHYDRAMINE HYDROCHLORIDE 50 MG: 50 INJECTION, SOLUTION INTRAMUSCULAR; INTRAVENOUS at 15:02

## 2023-07-20 RX ADMIN — ONDANSETRON 8 MG: 2 INJECTION INTRAMUSCULAR; INTRAVENOUS at 15:06

## 2023-07-20 RX ADMIN — SODIUM CHLORIDE, PRESERVATIVE FREE 10 ML: 5 INJECTION INTRAVENOUS at 13:45

## 2023-07-20 RX ADMIN — SODIUM CHLORIDE 50 ML/HR: 9 INJECTION, SOLUTION INTRAVENOUS at 14:30

## 2023-07-20 ASSESSMENT — PAIN DESCRIPTION - DESCRIPTORS: DESCRIPTORS: ACHING

## 2023-07-20 ASSESSMENT — PAIN DESCRIPTION - ONSET: ONSET: ON-GOING

## 2023-07-20 ASSESSMENT — PAIN DESCRIPTION - ORIENTATION
ORIENTATION: MID
ORIENTATION: MID

## 2023-07-20 ASSESSMENT — PAIN SCALES - GENERAL
PAINLEVEL_OUTOF10: 8
PAINLEVEL_OUTOF10: 8
PAINLEVEL_OUTOF10: 6

## 2023-07-20 ASSESSMENT — PAIN DESCRIPTION - LOCATION
LOCATION: ABDOMEN

## 2023-07-20 ASSESSMENT — PAIN DESCRIPTION - FREQUENCY: FREQUENCY: CONTINUOUS

## 2023-07-20 NOTE — PROGRESS NOTES
Arrived to the [address] [facility]. Labs, taxol completed. Patient tolerated . Any issues or concerns during appointment: pt reports ongoing abdominal pain 8/10. Discussed with palliative care NP Gabrielle Mazariegos for 2mg IV morphine, pt does not wish to try adding oral liquid pain medication at home. She was started on fentanyl patch on 7/13. Pt is scheduled to see palliative care NP again on 7/27 and is to call MD office if she has further pain needs prior to next OV. Patient aware of next infusion appointment on 7/27/23 (date) at 1:30pm (time). Patient aware of next lab and [facility] office visit on 7/27/23 (date) at 11:00am (time). Patient instructed to call provider with temperature of 100.4 or greater or nausea/vomiting/ diarrhea or pain not controlled by medications  Discharged via [facility] with visitor.

## 2023-07-23 ENCOUNTER — APPOINTMENT (OUTPATIENT)
Dept: GENERAL RADIOLOGY | Age: 48
End: 2023-07-23

## 2023-07-23 ENCOUNTER — HOSPITAL ENCOUNTER (EMERGENCY)
Age: 48
Discharge: HOME | End: 2023-07-23
Attending: EMERGENCY MEDICINE

## 2023-07-23 VITALS
DIASTOLIC BLOOD PRESSURE: 88 MMHG | RESPIRATION RATE: 15 BRPM | OXYGEN SATURATION: 94 % | HEART RATE: 99 BPM | SYSTOLIC BLOOD PRESSURE: 116 MMHG | TEMPERATURE: 97.9 F

## 2023-07-23 DIAGNOSIS — C78.6 PERITONEAL CARCINOMATOSIS (HCC): ICD-10-CM

## 2023-07-23 DIAGNOSIS — R10.13 ABDOMINAL PAIN, EPIGASTRIC: ICD-10-CM

## 2023-07-23 DIAGNOSIS — R11.2 NAUSEA AND VOMITING, UNSPECIFIED VOMITING TYPE: Primary | ICD-10-CM

## 2023-07-23 LAB
ALBUMIN SERPL-MCNC: 2.3 G/DL (ref 3.5–5)
ALBUMIN/GLOB SERPL: 0.5 (ref 0.4–1.6)
ALP SERPL-CCNC: 220 U/L (ref 50–136)
ALT SERPL-CCNC: 172 U/L (ref 12–65)
ANION GAP SERPL CALC-SCNC: 4 MMOL/L (ref 2–11)
AST SERPL-CCNC: 112 U/L (ref 15–37)
BASOPHILS # BLD: 0 K/UL (ref 0–0.2)
BASOPHILS NFR BLD: 0 % (ref 0–2)
BILIRUB SERPL-MCNC: 0.8 MG/DL (ref 0.2–1.1)
BUN SERPL-MCNC: 23 MG/DL (ref 6–23)
CALCIUM SERPL-MCNC: 8.6 MG/DL (ref 8.3–10.4)
CHLORIDE SERPL-SCNC: 107 MMOL/L (ref 101–110)
CO2 SERPL-SCNC: 31 MMOL/L (ref 21–32)
CREAT SERPL-MCNC: 0.6 MG/DL (ref 0.6–1)
DIFFERENTIAL METHOD BLD: ABNORMAL
EOSINOPHIL # BLD: 0 K/UL (ref 0–0.8)
EOSINOPHIL NFR BLD: 0 % (ref 0.5–7.8)
ERYTHROCYTE [DISTWIDTH] IN BLOOD BY AUTOMATED COUNT: 23.4 % (ref 11.9–14.6)
GLOBULIN SER CALC-MCNC: 4.3 G/DL (ref 2.8–4.5)
GLUCOSE SERPL-MCNC: 121 MG/DL (ref 65–100)
HCT VFR BLD AUTO: 28.2 % (ref 35.8–46.3)
HGB BLD-MCNC: 7.9 G/DL (ref 11.7–15.4)
IMM GRANULOCYTES # BLD AUTO: 0 K/UL (ref 0–0.5)
IMM GRANULOCYTES NFR BLD AUTO: 0 % (ref 0–5)
LIPASE SERPL-CCNC: 57 U/L (ref 73–393)
LYMPHOCYTES # BLD: 0.9 K/UL (ref 0.5–4.6)
LYMPHOCYTES NFR BLD: 19 % (ref 13–44)
MAGNESIUM SERPL-MCNC: 2.2 MG/DL (ref 1.8–2.4)
MCH RBC QN AUTO: 24.8 PG (ref 26.1–32.9)
MCHC RBC AUTO-ENTMCNC: 28 G/DL (ref 31.4–35)
MCV RBC AUTO: 88.4 FL (ref 82–102)
MONOCYTES # BLD: 0.2 K/UL (ref 0.1–1.3)
MONOCYTES NFR BLD: 3 % (ref 4–12)
NEUTS SEG # BLD: 3.7 K/UL (ref 1.7–8.2)
NEUTS SEG NFR BLD: 78 % (ref 43–78)
NRBC # BLD: 0.07 K/UL (ref 0–0.2)
PHOSPHATE SERPL-MCNC: 3.6 MG/DL (ref 2.5–4.5)
PLATELET # BLD AUTO: 401 K/UL (ref 150–450)
PMV BLD AUTO: 12 FL (ref 9.4–12.3)
POTASSIUM SERPL-SCNC: 4 MMOL/L (ref 3.5–5.1)
PROT SERPL-MCNC: 6.6 G/DL (ref 6.3–8.2)
RBC # BLD AUTO: 3.19 M/UL (ref 4.05–5.2)
SODIUM SERPL-SCNC: 142 MMOL/L (ref 133–143)
TROPONIN I SERPL HS-MCNC: 3.9 PG/ML (ref 0–14)
TROPONIN I SERPL HS-MCNC: 4.6 PG/ML (ref 0–14)
WBC # BLD AUTO: 4.8 K/UL (ref 4.3–11.1)

## 2023-07-23 PROCEDURE — A4216 STERILE WATER/SALINE, 10 ML: HCPCS | Performed by: EMERGENCY MEDICINE

## 2023-07-23 PROCEDURE — 2580000003 HC RX 258: Performed by: EMERGENCY MEDICINE

## 2023-07-23 PROCEDURE — 99285 EMERGENCY DEPT VISIT HI MDM: CPT

## 2023-07-23 PROCEDURE — 84484 ASSAY OF TROPONIN QUANT: CPT

## 2023-07-23 PROCEDURE — 96375 TX/PRO/DX INJ NEW DRUG ADDON: CPT

## 2023-07-23 PROCEDURE — 80053 COMPREHEN METABOLIC PANEL: CPT

## 2023-07-23 PROCEDURE — 83735 ASSAY OF MAGNESIUM: CPT

## 2023-07-23 PROCEDURE — 96374 THER/PROPH/DIAG INJ IV PUSH: CPT

## 2023-07-23 PROCEDURE — 96376 TX/PRO/DX INJ SAME DRUG ADON: CPT

## 2023-07-23 PROCEDURE — 71045 X-RAY EXAM CHEST 1 VIEW: CPT

## 2023-07-23 PROCEDURE — C9113 INJ PANTOPRAZOLE SODIUM, VIA: HCPCS | Performed by: EMERGENCY MEDICINE

## 2023-07-23 PROCEDURE — 83690 ASSAY OF LIPASE: CPT

## 2023-07-23 PROCEDURE — 84100 ASSAY OF PHOSPHORUS: CPT

## 2023-07-23 PROCEDURE — 93005 ELECTROCARDIOGRAM TRACING: CPT | Performed by: EMERGENCY MEDICINE

## 2023-07-23 PROCEDURE — 6360000002 HC RX W HCPCS: Performed by: EMERGENCY MEDICINE

## 2023-07-23 PROCEDURE — 2500000003 HC RX 250 WO HCPCS: Performed by: EMERGENCY MEDICINE

## 2023-07-23 PROCEDURE — 96361 HYDRATE IV INFUSION ADD-ON: CPT

## 2023-07-23 PROCEDURE — 85025 COMPLETE CBC W/AUTO DIFF WBC: CPT

## 2023-07-23 RX ORDER — ONDANSETRON 2 MG/ML
4 INJECTION INTRAMUSCULAR; INTRAVENOUS
Status: COMPLETED | OUTPATIENT
Start: 2023-07-23 | End: 2023-07-23

## 2023-07-23 RX ORDER — HYDROMORPHONE HYDROCHLORIDE 1 MG/ML
1 INJECTION, SOLUTION INTRAMUSCULAR; INTRAVENOUS; SUBCUTANEOUS
Status: COMPLETED | OUTPATIENT
Start: 2023-07-23 | End: 2023-07-23

## 2023-07-23 RX ORDER — SODIUM CHLORIDE, SODIUM LACTATE, POTASSIUM CHLORIDE, AND CALCIUM CHLORIDE .6; .31; .03; .02 G/100ML; G/100ML; G/100ML; G/100ML
1000 INJECTION, SOLUTION INTRAVENOUS ONCE
Status: COMPLETED | OUTPATIENT
Start: 2023-07-23 | End: 2023-07-23

## 2023-07-23 RX ADMIN — HYDROMORPHONE HYDROCHLORIDE 1 MG: 1 INJECTION, SOLUTION INTRAMUSCULAR; INTRAVENOUS; SUBCUTANEOUS at 21:56

## 2023-07-23 RX ADMIN — SODIUM CHLORIDE 40 MG: 9 INJECTION INTRAMUSCULAR; INTRAVENOUS; SUBCUTANEOUS at 19:10

## 2023-07-23 RX ADMIN — HYDROMORPHONE HYDROCHLORIDE 1 MG: 1 INJECTION, SOLUTION INTRAMUSCULAR; INTRAVENOUS; SUBCUTANEOUS at 19:36

## 2023-07-23 RX ADMIN — SODIUM CHLORIDE, POTASSIUM CHLORIDE, SODIUM LACTATE AND CALCIUM CHLORIDE 1000 ML: 600; 310; 30; 20 INJECTION, SOLUTION INTRAVENOUS at 19:11

## 2023-07-23 RX ADMIN — ONDANSETRON 4 MG: 2 INJECTION INTRAMUSCULAR; INTRAVENOUS at 19:09

## 2023-07-23 RX ADMIN — ONDANSETRON 4 MG: 2 INJECTION INTRAMUSCULAR; INTRAVENOUS at 21:56

## 2023-07-23 ASSESSMENT — PAIN DESCRIPTION - LOCATION
LOCATION: ABDOMEN

## 2023-07-23 ASSESSMENT — ENCOUNTER SYMPTOMS
WHEEZING: 0
NAUSEA: 1
SHORTNESS OF BREATH: 0
EYE REDNESS: 0
SINUS PAIN: 0
TROUBLE SWALLOWING: 0
DIARRHEA: 0
EYE PAIN: 0
COUGH: 0
VOMITING: 1
CONSTIPATION: 0
BACK PAIN: 0
EYE ITCHING: 0
ABDOMINAL PAIN: 1

## 2023-07-23 ASSESSMENT — PAIN SCALES - GENERAL
PAINLEVEL_OUTOF10: 7
PAINLEVEL_OUTOF10: 10
PAINLEVEL_OUTOF10: 7
PAINLEVEL_OUTOF10: 5

## 2023-07-23 ASSESSMENT — LIFESTYLE VARIABLES
HOW MANY STANDARD DRINKS CONTAINING ALCOHOL DO YOU HAVE ON A TYPICAL DAY: PATIENT DOES NOT DRINK
HOW OFTEN DO YOU HAVE A DRINK CONTAINING ALCOHOL: NEVER

## 2023-07-23 NOTE — ED TRIAGE NOTES
Pt arrives to ER with c/o of chest pain/reflux/coffee ground emesis. . Pt states that this has been going on for two weeks now. Pt states that it feels as if acid is trying to come up and that there is a knot in their chest. Pt states x one day ago that coffee ground emesis began as well. Pt currently has colon cancer and is on tpn.

## 2023-07-23 NOTE — ED PROVIDER NOTES
Emergency Department Provider Note       PCP: None None   Age: 50 y.o. Sex: female     DISPOSITION Decision To Discharge 07/23/2023 09:38:54 PM       ICD-10-CM    1. Nausea and vomiting, unspecified vomiting type  R11.2       2. Abdominal pain, epigastric  R10.13       3. Peritoneal carcinomatosis [facility])  C78.6           Medical Decision Making     Complexity of Problems Addressed:  1 or more chronic illnesses with a severe exacerbation or progression. Data Reviewed and Analyzed:  Category 1:   I independently ordered and reviewed each unique test.  I reviewed external records: provider visit note from PCP. I reviewed external records: provider visit note from outside specialist.       Category 2:   I interpreted the X-rays chest x-ray revealed no evidence of acute cardiopulmonary disease. Category 3: Discussion   80-year-old female patient with a history of peritoneal carcinomatosis GERD and malnutrition presents with nausea vomiting and epigastric pain  Patient's work-up today without acute changes and feeling better after medications  Patient feels that she is improved to the point where she can go home. She does have follow-up testing, likely gastric emptying study, scheduled for tomorrow  Advised the patient to continue all her normal medications. Advised increasing fluids and a bland diet  We discussed return precautions and need for close appropriate follow-up      Risk of Complications and/or Morbidity of Patient Management:  Parental controlled substances given in the ED. Patient was discharged risks and benefits of hospitalization were considered. Shared medical decision making was utilized in creating the patients health plan today.          History      Tristin Rosales is a 50 y.o. female who presents to the Emergency Department with chief complaint of    Chief Complaint   Patient presents with    Chest Pain      80-year-old female patient presents with complaints of epigastric abdominal

## 2023-07-24 ENCOUNTER — HOSPITAL ENCOUNTER (INPATIENT)
Age: 48
LOS: 4 days | Discharge: HOME | End: 2023-07-28
Attending: EMERGENCY MEDICINE | Admitting: FAMILY MEDICINE
Payer: COMMERCIAL

## 2023-07-24 ENCOUNTER — APPOINTMENT (OUTPATIENT)
Dept: GENERAL RADIOLOGY | Age: 48
End: 2023-07-24
Payer: COMMERCIAL

## 2023-07-24 DIAGNOSIS — C78.6 PERITONEAL CARCINOMATOSIS (HCC): Primary | ICD-10-CM

## 2023-07-24 DIAGNOSIS — R11.2 INTRACTABLE NAUSEA AND VOMITING: ICD-10-CM

## 2023-07-24 PROBLEM — D50.0 IRON DEFICIENCY ANEMIA DUE TO CHRONIC BLOOD LOSS: Status: ACTIVE | Noted: 2018-06-01

## 2023-07-24 PROBLEM — R10.9 ABDOMINAL PAIN: Status: RESOLVED | Noted: 2022-10-03 | Resolved: 2023-07-24

## 2023-07-24 PROBLEM — R52 INTRACTABLE PAIN: Status: ACTIVE | Noted: 2023-07-24

## 2023-07-24 LAB
ALBUMIN SERPL-MCNC: 2.2 G/DL (ref 3.5–5)
ALBUMIN/GLOB SERPL: 0.5 (ref 0.4–1.6)
ALP SERPL-CCNC: 201 U/L (ref 50–130)
ALT SERPL-CCNC: 164 U/L (ref 12–65)
ANION GAP SERPL CALC-SCNC: 6 MMOL/L (ref 2–11)
APPEARANCE UR: ABNORMAL
AST SERPL-CCNC: 97 U/L (ref 15–37)
BACTERIA URNS QL MICRO: NEGATIVE /HPF
BASOPHILS # BLD: 0 K/UL (ref 0–0.2)
BASOPHILS NFR BLD: 0 % (ref 0–2)
BILIRUB SERPL-MCNC: 1 MG/DL (ref 0.2–1.1)
BILIRUB UR QL: ABNORMAL
BUN SERPL-MCNC: 30 MG/DL (ref 6–23)
CALCIUM SERPL-MCNC: 8.9 MG/DL (ref 8.3–10.4)
CASTS URNS QL MICRO: ABNORMAL /LPF
CHLORIDE SERPL-SCNC: 107 MMOL/L (ref 101–110)
CO2 SERPL-SCNC: 29 MMOL/L (ref 21–32)
COLOR UR: ABNORMAL
CREAT SERPL-MCNC: 0.53 MG/DL (ref 0.6–1)
DIFFERENTIAL METHOD BLD: ABNORMAL
EKG ATRIAL RATE: 109 BPM
EKG ATRIAL RATE: 117 BPM
EKG DIAGNOSIS: NORMAL
EKG DIAGNOSIS: NORMAL
EKG P AXIS: 37 DEGREES
EKG P AXIS: 51 DEGREES
EKG P-R INTERVAL: 100 MS
EKG P-R INTERVAL: 99 MS
EKG Q-T INTERVAL: 322 MS
EKG Q-T INTERVAL: 347 MS
EKG QRS DURATION: 83 MS
EKG QRS DURATION: 86 MS
EKG QTC CALCULATION (BAZETT): 450 MS
EKG QTC CALCULATION (BAZETT): 470 MS
EKG R AXIS: 44 DEGREES
EKG R AXIS: 51 DEGREES
EKG T AXIS: 32 DEGREES
EKG T AXIS: 54 DEGREES
EKG VENTRICULAR RATE: 110 BPM
EKG VENTRICULAR RATE: 117 BPM
EOSINOPHIL # BLD: 0 K/UL (ref 0–0.8)
EOSINOPHIL NFR BLD: 0 % (ref 0.5–7.8)
EPI CELLS #/AREA URNS HPF: ABNORMAL /HPF
ERYTHROCYTE [DISTWIDTH] IN BLOOD BY AUTOMATED COUNT: 23 % (ref 11.9–14.6)
GLOBULIN SER CALC-MCNC: 4.3 G/DL (ref 2.8–4.5)
GLUCOSE SERPL-MCNC: 101 MG/DL (ref 65–100)
GLUCOSE UR STRIP.AUTO-MCNC: NEGATIVE MG/DL
HCT VFR BLD AUTO: 27.9 % (ref 35.8–46.3)
HGB BLD-MCNC: 7.7 G/DL (ref 11.7–15.4)
HGB UR QL STRIP: NEGATIVE
IMM GRANULOCYTES # BLD AUTO: 0 K/UL (ref 0–0.5)
IMM GRANULOCYTES NFR BLD AUTO: 1 % (ref 0–5)
KETONES UR QL STRIP.AUTO: NEGATIVE MG/DL
LEUKOCYTE ESTERASE UR QL STRIP.AUTO: ABNORMAL
LIPASE SERPL-CCNC: 49 U/L (ref 73–393)
LYMPHOCYTES # BLD: 0.7 K/UL (ref 0.5–4.6)
LYMPHOCYTES NFR BLD: 22 % (ref 13–44)
MCH RBC QN AUTO: 24.8 PG (ref 26.1–32.9)
MCHC RBC AUTO-ENTMCNC: 27.6 G/DL (ref 31.4–35)
MCV RBC AUTO: 90 FL (ref 82–102)
MONOCYTES # BLD: 0.1 K/UL (ref 0.1–1.3)
MONOCYTES NFR BLD: 4 % (ref 4–12)
MUCOUS THREADS URNS QL MICRO: 0 /LPF
NEUTS SEG # BLD: 2.2 K/UL (ref 1.7–8.2)
NEUTS SEG NFR BLD: 73 % (ref 43–78)
NITRITE UR QL STRIP.AUTO: NEGATIVE
NRBC # BLD: 0.05 K/UL (ref 0–0.2)
PH UR STRIP: 5.5 (ref 5–9)
PLATELET # BLD AUTO: 353 K/UL (ref 150–450)
PMV BLD AUTO: 12.1 FL (ref 9.4–12.3)
POTASSIUM SERPL-SCNC: 4.3 MMOL/L (ref 3.5–5.1)
PROT SERPL-MCNC: 6.5 G/DL (ref 6.3–8.2)
PROT UR STRIP-MCNC: 30 MG/DL
RBC # BLD AUTO: 3.1 M/UL (ref 4.05–5.2)
RBC #/AREA URNS HPF: ABNORMAL /HPF
SODIUM SERPL-SCNC: 142 MMOL/L (ref 133–143)
SP GR UR REFRACTOMETRY: 1.02 (ref 1–1.02)
URINE CULTURE IF INDICATED: ABNORMAL
UROBILINOGEN UR QL STRIP.AUTO: 1 EU/DL (ref 0.2–1)
WBC # BLD AUTO: 3.1 K/UL (ref 4.3–11.1)
WBC URNS QL MICRO: ABNORMAL /HPF

## 2023-07-24 PROCEDURE — 96361 HYDRATE IV INFUSION ADD-ON: CPT

## 2023-07-24 PROCEDURE — 6360000002 HC RX W HCPCS

## 2023-07-24 PROCEDURE — 2500000003 HC RX 250 WO HCPCS: Performed by: FAMILY MEDICINE

## 2023-07-24 PROCEDURE — 81001 URINALYSIS AUTO W/SCOPE: CPT

## 2023-07-24 PROCEDURE — 83690 ASSAY OF LIPASE: CPT

## 2023-07-24 PROCEDURE — 6370000000 HC RX 637 (ALT 250 FOR IP): Performed by: FAMILY MEDICINE

## 2023-07-24 PROCEDURE — 6360000002 HC RX W HCPCS: Performed by: FAMILY MEDICINE

## 2023-07-24 PROCEDURE — 2400000000

## 2023-07-24 PROCEDURE — 96374 THER/PROPH/DIAG INJ IV PUSH: CPT

## 2023-07-24 PROCEDURE — 94760 N-INVAS EAR/PLS OXIMETRY 1: CPT

## 2023-07-24 PROCEDURE — 2580000003 HC RX 258: Performed by: FAMILY MEDICINE

## 2023-07-24 PROCEDURE — 6360000002 HC RX W HCPCS: Performed by: HOSPITALIST

## 2023-07-24 PROCEDURE — 93010 ELECTROCARDIOGRAM REPORT: CPT | Performed by: INTERNAL MEDICINE

## 2023-07-24 PROCEDURE — C9113 INJ PANTOPRAZOLE SODIUM, VIA: HCPCS | Performed by: FAMILY MEDICINE

## 2023-07-24 PROCEDURE — 96375 TX/PRO/DX INJ NEW DRUG ADDON: CPT

## 2023-07-24 PROCEDURE — 71045 X-RAY EXAM CHEST 1 VIEW: CPT

## 2023-07-24 PROCEDURE — 99285 EMERGENCY DEPT VISIT HI MDM: CPT

## 2023-07-24 PROCEDURE — A4216 STERILE WATER/SALINE, 10 ML: HCPCS | Performed by: FAMILY MEDICINE

## 2023-07-24 PROCEDURE — 1100000000 HC RM PRIVATE

## 2023-07-24 PROCEDURE — 93005 ELECTROCARDIOGRAM TRACING: CPT

## 2023-07-24 PROCEDURE — 2580000003 HC RX 258

## 2023-07-24 PROCEDURE — 96376 TX/PRO/DX INJ SAME DRUG ADON: CPT

## 2023-07-24 PROCEDURE — 80053 COMPREHEN METABOLIC PANEL: CPT

## 2023-07-24 PROCEDURE — 85025 COMPLETE CBC W/AUTO DIFF WBC: CPT

## 2023-07-24 RX ORDER — POLYETHYLENE GLYCOL 3350 17 G/17G
17 POWDER, FOR SOLUTION ORAL DAILY PRN
Status: DISCONTINUED | OUTPATIENT
Start: 2023-07-24 | End: 2023-07-28 | Stop reason: HOSPADM

## 2023-07-24 RX ORDER — SODIUM CHLORIDE 0.9 % (FLUSH) 0.9 %
5-40 SYRINGE (ML) INJECTION PRN
Status: DISCONTINUED | OUTPATIENT
Start: 2023-07-24 | End: 2023-07-28 | Stop reason: HOSPADM

## 2023-07-24 RX ORDER — SODIUM CHLORIDE 9 MG/ML
INJECTION, SOLUTION INTRAVENOUS PRN
Status: DISCONTINUED | OUTPATIENT
Start: 2023-07-24 | End: 2023-07-28 | Stop reason: HOSPADM

## 2023-07-24 RX ORDER — ONDANSETRON 2 MG/ML
4 INJECTION INTRAMUSCULAR; INTRAVENOUS
Status: COMPLETED | OUTPATIENT
Start: 2023-07-24 | End: 2023-07-24

## 2023-07-24 RX ORDER — ONDANSETRON 2 MG/ML
4 INJECTION INTRAMUSCULAR; INTRAVENOUS EVERY 6 HOURS PRN
Status: DISCONTINUED | OUTPATIENT
Start: 2023-07-24 | End: 2023-07-25

## 2023-07-24 RX ORDER — AMOXICILLIN AND CLAVULANATE POTASSIUM 400; 57 MG/5ML; MG/5ML
400 POWDER, FOR SUSPENSION ORAL EVERY 12 HOURS SCHEDULED
Status: DISCONTINUED | OUTPATIENT
Start: 2023-07-24 | End: 2023-07-24

## 2023-07-24 RX ORDER — SODIUM CHLORIDE 0.9 % (FLUSH) 0.9 %
5-40 SYRINGE (ML) INJECTION EVERY 12 HOURS SCHEDULED
Status: DISCONTINUED | OUTPATIENT
Start: 2023-07-24 | End: 2023-07-28 | Stop reason: HOSPADM

## 2023-07-24 RX ORDER — ACETAMINOPHEN 650 MG/1
650 SUPPOSITORY RECTAL EVERY 6 HOURS PRN
Status: DISCONTINUED | OUTPATIENT
Start: 2023-07-24 | End: 2023-07-24

## 2023-07-24 RX ORDER — ACETAMINOPHEN 325 MG/1
650 TABLET ORAL EVERY 6 HOURS PRN
Status: DISCONTINUED | OUTPATIENT
Start: 2023-07-24 | End: 2023-07-24

## 2023-07-24 RX ORDER — PROCHLORPERAZINE EDISYLATE 5 MG/ML
10 INJECTION INTRAMUSCULAR; INTRAVENOUS ONCE
Status: COMPLETED | OUTPATIENT
Start: 2023-07-24 | End: 2023-07-24

## 2023-07-24 RX ORDER — SODIUM CHLORIDE, SODIUM LACTATE, POTASSIUM CHLORIDE, AND CALCIUM CHLORIDE .6; .31; .03; .02 G/100ML; G/100ML; G/100ML; G/100ML
1000 INJECTION, SOLUTION INTRAVENOUS ONCE
Status: COMPLETED | OUTPATIENT
Start: 2023-07-24 | End: 2023-07-24

## 2023-07-24 RX ORDER — MAGNESIUM SULFATE IN WATER 40 MG/ML
2000 INJECTION, SOLUTION INTRAVENOUS PRN
Status: DISCONTINUED | OUTPATIENT
Start: 2023-07-24 | End: 2023-07-28 | Stop reason: HOSPADM

## 2023-07-24 RX ORDER — POTASSIUM CHLORIDE 7.45 MG/ML
10 INJECTION INTRAVENOUS PRN
Status: DISCONTINUED | OUTPATIENT
Start: 2023-07-24 | End: 2023-07-28 | Stop reason: HOSPADM

## 2023-07-24 RX ORDER — POTASSIUM CHLORIDE 20 MEQ/1
40 TABLET, EXTENDED RELEASE ORAL PRN
Status: DISCONTINUED | OUTPATIENT
Start: 2023-07-24 | End: 2023-07-28 | Stop reason: HOSPADM

## 2023-07-24 RX ORDER — ONDANSETRON 4 MG/1
4 TABLET, ORALLY DISINTEGRATING ORAL EVERY 8 HOURS PRN
Status: DISCONTINUED | OUTPATIENT
Start: 2023-07-24 | End: 2023-07-25

## 2023-07-24 RX ORDER — ACETAMINOPHEN 500 MG
1000 TABLET ORAL EVERY 6 HOURS SCHEDULED
Status: DISPENSED | OUTPATIENT
Start: 2023-07-24 | End: 2023-07-27

## 2023-07-24 RX ORDER — HYDROMORPHONE HYDROCHLORIDE 1 MG/ML
0.5 INJECTION, SOLUTION INTRAMUSCULAR; INTRAVENOUS; SUBCUTANEOUS
Status: DISCONTINUED | OUTPATIENT
Start: 2023-07-24 | End: 2023-07-25

## 2023-07-24 RX ORDER — ENOXAPARIN SODIUM 100 MG/ML
40 INJECTION SUBCUTANEOUS EVERY 24 HOURS
Status: DISCONTINUED | OUTPATIENT
Start: 2023-07-24 | End: 2023-07-28 | Stop reason: HOSPADM

## 2023-07-24 RX ORDER — HYDROMORPHONE HYDROCHLORIDE 1 MG/ML
1 INJECTION, SOLUTION INTRAMUSCULAR; INTRAVENOUS; SUBCUTANEOUS
Status: DISCONTINUED | OUTPATIENT
Start: 2023-07-24 | End: 2023-07-25

## 2023-07-24 RX ORDER — FENTANYL 12.5 UG/1
1 PATCH TRANSDERMAL
Status: DISCONTINUED | OUTPATIENT
Start: 2023-07-24 | End: 2023-07-25

## 2023-07-24 RX ADMIN — HYDROMORPHONE HYDROCHLORIDE 0.5 MG: 1 INJECTION, SOLUTION INTRAMUSCULAR; INTRAVENOUS; SUBCUTANEOUS at 12:24

## 2023-07-24 RX ADMIN — SODIUM CHLORIDE, PRESERVATIVE FREE 40 MG: 5 INJECTION INTRAVENOUS at 20:46

## 2023-07-24 RX ADMIN — SODIUM CHLORIDE, POTASSIUM CHLORIDE, SODIUM LACTATE AND CALCIUM CHLORIDE 1000 ML: 600; 310; 30; 20 INJECTION, SOLUTION INTRAVENOUS at 10:35

## 2023-07-24 RX ADMIN — ENOXAPARIN SODIUM 40 MG: 40 INJECTION SUBCUTANEOUS at 15:53

## 2023-07-24 RX ADMIN — HYDROMORPHONE HYDROCHLORIDE 1 MG: 1 INJECTION, SOLUTION INTRAMUSCULAR; INTRAVENOUS; SUBCUTANEOUS at 20:45

## 2023-07-24 RX ADMIN — SODIUM CHLORIDE, PRESERVATIVE FREE 10 ML: 5 INJECTION INTRAVENOUS at 20:46

## 2023-07-24 RX ADMIN — PROCHLORPERAZINE EDISYLATE 10 MG: 5 INJECTION INTRAMUSCULAR; INTRAVENOUS at 20:46

## 2023-07-24 RX ADMIN — ONDANSETRON 4 MG: 2 INJECTION INTRAMUSCULAR; INTRAVENOUS at 10:36

## 2023-07-24 RX ADMIN — HYDROMORPHONE HYDROCHLORIDE 1 MG: 1 INJECTION, SOLUTION INTRAMUSCULAR; INTRAVENOUS; SUBCUTANEOUS at 17:15

## 2023-07-24 RX ADMIN — TUBERCULIN PURIFIED PROTEIN DERIVATIVE 5 UNITS: 5 INJECTION, SOLUTION INTRADERMAL at 15:54

## 2023-07-24 RX ADMIN — ONDANSETRON 4 MG: 2 INJECTION INTRAMUSCULAR; INTRAVENOUS at 16:08

## 2023-07-24 RX ADMIN — HYDROMORPHONE HYDROCHLORIDE 1 MG: 1 INJECTION, SOLUTION INTRAMUSCULAR; INTRAVENOUS; SUBCUTANEOUS at 10:36

## 2023-07-24 ASSESSMENT — PAIN - FUNCTIONAL ASSESSMENT: PAIN_FUNCTIONAL_ASSESSMENT: 0-10

## 2023-07-24 ASSESSMENT — PAIN DESCRIPTION - LOCATION
LOCATION: ABDOMEN

## 2023-07-24 ASSESSMENT — ENCOUNTER SYMPTOMS
DIARRHEA: 0
ABDOMINAL PAIN: 1
SHORTNESS OF BREATH: 0
NAUSEA: 1
CHEST TIGHTNESS: 0
VOMITING: 1
COLOR CHANGE: 0

## 2023-07-24 ASSESSMENT — PAIN SCALES - GENERAL
PAINLEVEL_OUTOF10: 9
PAINLEVEL_OUTOF10: 8
PAINLEVEL_OUTOF10: 4
PAINLEVEL_OUTOF10: 8
PAINLEVEL_OUTOF10: 8

## 2023-07-24 ASSESSMENT — PAIN DESCRIPTION - DESCRIPTORS: DESCRIPTORS: SHARP;SHOOTING

## 2023-07-24 NOTE — PROGRESS NOTES
TRANSFER - IN REPORT:    Verbal report received from [place], [place] on [employer]  being received from ED for routine progression of patient care      Report consisted of patient's Situation, Background, Assessment and   Recommendations(SBAR). Information from the following report(s) ED Encounter Summary and ED SBAR was reviewed with the receiving nurse. Opportunity for questions and clarification was provided. Assessment completed upon patient's arrival to unit and care assumed.

## 2023-07-24 NOTE — H&P
Hospitalist History and Physical   Admit Date:  2023  4:81 AM   Name:  Lindy Valdez   Age:  50 y.o. Sex:  female  :  1975   MRN:  [id number]   Room:  [facility]/    Presenting/Chief Complaint: Abdominal Pain and Emesis     Reason(s) for Admission: Peritoneal carcinomatosis ([address]) [C78.6]  Intractable pain [R52]  Intractable nausea and vomiting [R11.2]     History of Present Illness:   Lindy Valdez is a 50 y.o. female with medical history of metastatic carcinoma, anemia who presented with intractable pain, nausea and coffee ground emesis. It had been going on for several days. She was attempting outpatient management. The pain exceeded what she could tolerate and she presented to the ED twice. She was admitted for further evaluation and management. 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 No  Diet: Diet NPO  VTE prophylaxis:  enoxaparin held  Code status: Full Code    Hospital Problems:  Principal Problem:    Intractable pain  Active Problems:    GERD (gastroesophageal reflux disease)    Chemotherapy induced nausea and vomiting    Immunocompromised (HCC)    Iron deficiency anemia due to chronic blood loss    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 ([address])  Resolved Problems:    * No resolved hospital problems.  *       Past History:     Past Medical History:   Diagnosis Date    Abdominal pain 10/3/2022    Alteration in nutrition     per RD note TPN dependent 12 hours per

## 2023-07-24 NOTE — ED NOTES
Arrives via w/c  Reports generalized abd pain   Reports vomiting for 2 weeks. Seen yesterday.  Currently has colon cx      Sweta Monroy RN  07/24/23 [id number]

## 2023-07-24 NOTE — ED PROVIDER NOTES
Emergency Department Provider Note       PCP: None None   Age: 50 y.o. Sex: female     DISPOSITION       No diagnosis found. Medical Decision Making     Complexity of Problems Addressed:  1 or more chronic illnesses with a severe exacerbation or progression. Data Reviewed and Analyzed:  Category 1:   I independently ordered and reviewed each unique test.         Category 2:   I independently ordered and interpreted the ED EKG in the absence of a Cardiologist.    Rate: 110  EKG Interpretation: EKG Interpretation: sinus rhythm, no evidence of arrhythmia  ST Segments: Normal ST segments - NO STEMI  I interpreted the X-rays no acute findings. Category 3: Discussion of management or test interpretation. 44-year-old female history of peritoneal carcinomatosis secondary to colorectal cancer, malnutrition presenting with persistent generalized abdominal pain, nausea and vomiting for the past few weeks, somewhat worsening over the past 24 to 48 hours. Endorses some chest congestion. Seen in our department yesterday with relatively benign work-up and ultimately discharged home to continue current medical medical regimen. Last CT on 7/12. Patient mildly tachycardic but otherwise vitally stable. We will plan to recheck labs, chest x-ray, EKG and treat symptomatically. Does not appear to have any new symptoms. Has follow-up with GI in the next few days. If patient tolerating p.o., will likely discharge home. Labs essentially unchanged from yesterday. EKG sinus tachycardia with rate of 110, nonischemic. Chest x-ray remains clear. Patient feeling improved from a pain standpoint, however, continues to have nausea. She has persistently failed p.o. challenge in the emergency department. I discussed case with hematology/oncology consultants recommending observational admission. Hospitalist consulted. Patient understanding and wishes to proceed accordingly.   Risk of Complications and/or Morbidity of

## 2023-07-24 NOTE — DISCHARGE INSTRUCTIONS
Continue all your current medications  Drink plenty of fluids  [place] diet  Follow-up with your oncology team  Be sure to follow-up for your scheduled testing tomorrow    Return to ER for any worsening symptoms or new problems which may arise

## 2023-07-24 NOTE — CARE COORDINATION
SW met with patient who confirmed demographic information, states that she lives with her boyfriend who is supportive. Patient does not currently have a PCP or insurance. SW met with patient who is not currently insured or established with primary care. Self-pay resources provided to include [employer], [employer], [employer], and [employer]. Verbal education provided on each resources and how they could be of benefit. Patient states that she does not use assistive devices to ambulate, does not require ADL assistance, and denies any falls. Dispo pending. No needs identified from SW at this time. Will continue to follow ER work up. ASSESSMENT NOTE    Attending Physician: Landry Cheadle, MD  Admit Problem: No admission diagnoses are documented for this encounter.   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 ([address])    Hydronephrosis of right kidney    Peritoneal carcinoma ([address])    Peritoneal carcinomatosis ([address])    Hematemesis    Current use of long term anticoagulation    Intractable vomiting    Generalized abdominal pain    LFT elevation    Obstruction of both ureters    Abdominal pain    Hypokalemia    Hypomagnesemia    Hypoalbuminemia    GERD (gastroesophageal reflux disease)    Colitis    Intractable vomiting with nausea    Fever    CINV (chemotherapy-induced nausea and vomiting)    Intractable nausea and vomiting    Severe protein-calorie malnutrition (HCC)    Dysphagia    Metastatic carcinoma (HCC)    Chronic abdominal pain    Constipation    Localized edema    Abnormal CT of the abdomen Potential Assistance Needed     DME     DME     DME Ordered? Potential Assistance Purchasing Medications     Meds-to-Beds: Does the patient want to have any new prescriptions delivered to bedside prior to discharge? Type of Home Care Services     Patient expects to be discharged to: House   Follow Up Appointment: Best Day/Time     One/Two Story Residence:     # of Interior Steps     Height of Each Step (in)     [employer] Available     History of Falls? Services At/After Discharge  Transition of Care Consult (CM Consult): Internal Home Health     Internal Hospice     Reason Outside Agency [facility]     Partner SNF     Reason Why Partner SNF Not Chosen     Internal Comfort Care     Reason Outside [address] Discharge     [address] Provided? Mode of Transport at [facility] Time of Discharge     Confirm Follow Up Transport       Condition of Participation: Discharge Planning  The plan for Transition of Care is related to the following treatment goals: The Patient and/or Patient Representative was provided with a Choice of Provider? Name of the Patient Representative who was provided with the Choice of Provider and agrees with the Discharge Plan? The Patient and/or Patient Representative Agree with the Discharge Plan? Freedom of Choice list was provided with basic dialogue that supports the individualized plan of care/goals, treatment preferences, and shares the quality data associated with the providers?        Documentation for Discharge Appeal  Discharge Appealed by     Date notified by [facility] of appeal request:     Time notified by [employer] of appeal request:     Detailed Notice of Discharge given to:     Date Notice of Discharge given:     Time Notice of Discharge given:     Date records sent to [facility]     Time records sent to [address] [place] [address]     Date Notified of Outcome     Time Notified of Outcome     Outcome

## 2023-07-24 NOTE — ED NOTES
TRANSFER - OUT REPORT:    Verbal report given to TRI Hays on [employer]  being transferred to [phone] for urgent transfer       Report consisted of patient's Situation, Background, Assessment and   Recommendations(SBAR). Information from the following report(s) Nurse Handoff Report, Index, ED Encounter Summary, ED SBAR, Adult Overview, MAR, and Recent Results was reviewed with the receiving nurse. Proctor Fall Assessment:    Presents to emergency department  because of falls (Syncope, seizure, or loss of consciousness): No  Age > 70: No  Altered Mental Status, Intoxication with alcohol or substance confusion (Disorientation, impaired judgment, poor safety awaremess, or inability to follow instructions): No  Impaired Mobility: Ambulates or transfers with assistive devices or assistance; Unable to ambulate or transer.: No  Nursing Judgement: No          Lines:   Single Lumen Implantable Port Right Subclavian (Active)       Peripheral IV 07/24/23 Right Antecubital (Active)        Opportunity for questions and clarification was provided.       Patient transported with:  Lakeisha Tony RN  07/24/23 2526

## 2023-07-25 ENCOUNTER — ANESTHESIA (OUTPATIENT)
Dept: ENDOSCOPY | Age: 48
End: 2023-07-25
Payer: COMMERCIAL

## 2023-07-25 ENCOUNTER — ANESTHESIA EVENT (OUTPATIENT)
Dept: ENDOSCOPY | Age: 48
End: 2023-07-25
Payer: COMMERCIAL

## 2023-07-25 LAB
ALBUMIN SERPL-MCNC: 2.3 G/DL (ref 3.5–5)
ALBUMIN/GLOB SERPL: 0.5 (ref 0.4–1.6)
ALP SERPL-CCNC: 207 U/L (ref 50–136)
ALT SERPL-CCNC: 155 U/L (ref 12–65)
ANION GAP SERPL CALC-SCNC: 4 MMOL/L (ref 2–11)
AST SERPL-CCNC: 87 U/L (ref 15–37)
BASOPHILS # BLD: 0 K/UL (ref 0–0.2)
BASOPHILS NFR BLD: 1 % (ref 0–2)
BILIRUB SERPL-MCNC: 1.4 MG/DL (ref 0.2–1.1)
BUN SERPL-MCNC: 31 MG/DL (ref 6–23)
CALCIUM SERPL-MCNC: 8.8 MG/DL (ref 8.3–10.4)
CHLORIDE SERPL-SCNC: 108 MMOL/L (ref 101–110)
CHOLEST SERPL-MCNC: 161 MG/DL
CO2 SERPL-SCNC: 30 MMOL/L (ref 21–32)
CREAT SERPL-MCNC: 0.67 MG/DL (ref 0.6–1)
DIFFERENTIAL METHOD BLD: ABNORMAL
EOSINOPHIL # BLD: 0 K/UL (ref 0–0.8)
EOSINOPHIL NFR BLD: 0 % (ref 0.5–7.8)
ERYTHROCYTE [DISTWIDTH] IN BLOOD BY AUTOMATED COUNT: 23.1 % (ref 11.9–14.6)
EST. AVERAGE GLUCOSE BLD GHB EST-MCNC: 108 MG/DL
GLOBULIN SER CALC-MCNC: 4.2 G/DL (ref 2.8–4.5)
GLUCOSE SERPL-MCNC: 117 MG/DL (ref 65–100)
HBA1C MFR BLD: 5.4 % (ref 4.8–5.6)
HCT VFR BLD AUTO: 24.6 % (ref 35.8–46.3)
HCT VFR BLD AUTO: 25.6 % (ref 35.8–46.3)
HDLC SERPL-MCNC: 44 MG/DL (ref 40–60)
HDLC SERPL: 3.7
HGB BLD-MCNC: 6.9 G/DL (ref 11.7–15.4)
HGB BLD-MCNC: 7.6 G/DL (ref 11.7–15.4)
HISTORY CHECK: NORMAL
IMM GRANULOCYTES # BLD AUTO: 0 K/UL (ref 0–0.5)
IMM GRANULOCYTES NFR BLD AUTO: 1 % (ref 0–5)
LDLC SERPL CALC-MCNC: 81.4 MG/DL
LYMPHOCYTES # BLD: 1.1 K/UL (ref 0.5–4.6)
LYMPHOCYTES NFR BLD: 35 % (ref 13–44)
MCH RBC QN AUTO: 24.6 PG (ref 26.1–32.9)
MCHC RBC AUTO-ENTMCNC: 28 G/DL (ref 31.4–35)
MCV RBC AUTO: 87.9 FL (ref 82–102)
MM INDURATION, POC: 0 MM (ref 0–5)
MONOCYTES # BLD: 0.2 K/UL (ref 0.1–1.3)
MONOCYTES NFR BLD: 7 % (ref 4–12)
NEUTS SEG # BLD: 1.7 K/UL (ref 1.7–8.2)
NEUTS SEG NFR BLD: 56 % (ref 43–78)
NRBC # BLD: 0.06 K/UL (ref 0–0.2)
PHOSPHATE SERPL-MCNC: 3.8 MG/DL (ref 2.5–4.5)
PLATELET # BLD AUTO: 356 K/UL (ref 150–450)
PMV BLD AUTO: 12 FL (ref 9.4–12.3)
POTASSIUM SERPL-SCNC: 4.7 MMOL/L (ref 3.5–5.1)
PPD, POC: NEGATIVE
PROCALCITONIN SERPL-MCNC: 0.16 NG/ML (ref 0–0.49)
PROT SERPL-MCNC: 6.5 G/DL (ref 6.3–8.2)
RBC # BLD AUTO: 2.8 M/UL (ref 4.05–5.2)
SODIUM SERPL-SCNC: 142 MMOL/L (ref 133–143)
TRIGL SERPL-MCNC: 178 MG/DL (ref 35–150)
TSH W FREE THYROID IF ABNORMAL: 2.6 UIU/ML (ref 0.36–3.74)
VLDLC SERPL CALC-MCNC: 35.6 MG/DL (ref 6–23)
WBC # BLD AUTO: 3 K/UL (ref 4.3–11.1)

## 2023-07-25 PROCEDURE — 2500000003 HC RX 250 WO HCPCS: Performed by: FAMILY MEDICINE

## 2023-07-25 PROCEDURE — 6360000002 HC RX W HCPCS

## 2023-07-25 PROCEDURE — 2580000003 HC RX 258: Performed by: FAMILY MEDICINE

## 2023-07-25 PROCEDURE — 7100000011 HC PHASE II RECOVERY - ADDTL 15 MIN: Performed by: INTERNAL MEDICINE

## 2023-07-25 PROCEDURE — 0DB28ZX EXCISION OF MIDDLE ESOPHAGUS, VIA NATURAL OR ARTIFICIAL OPENING ENDOSCOPIC, DIAGNOSTIC: ICD-10-PCS | Performed by: FAMILY MEDICINE

## 2023-07-25 PROCEDURE — 6370000000 HC RX 637 (ALT 250 FOR IP)

## 2023-07-25 PROCEDURE — 36415 COLL VENOUS BLD VENIPUNCTURE: CPT

## 2023-07-25 PROCEDURE — 3609012400 HC EGD TRANSORAL BIOPSY SINGLE/MULTIPLE: Performed by: INTERNAL MEDICINE

## 2023-07-25 PROCEDURE — A4216 STERILE WATER/SALINE, 10 ML: HCPCS | Performed by: FAMILY MEDICINE

## 2023-07-25 PROCEDURE — 2709999900 HC NON-CHARGEABLE SUPPLY: Performed by: INTERNAL MEDICINE

## 2023-07-25 PROCEDURE — 86901 BLOOD TYPING SEROLOGIC RH(D): CPT

## 2023-07-25 PROCEDURE — 85025 COMPLETE CBC W/AUTO DIFF WBC: CPT

## 2023-07-25 PROCEDURE — 6360000002 HC RX W HCPCS: Performed by: INTERNAL MEDICINE

## 2023-07-25 PROCEDURE — 86900 BLOOD TYPING SEROLOGIC ABO: CPT

## 2023-07-25 PROCEDURE — 88312 SPECIAL STAINS GROUP 1: CPT

## 2023-07-25 PROCEDURE — 84145 PROCALCITONIN (PCT): CPT

## 2023-07-25 PROCEDURE — 1100000000 HC RM PRIVATE

## 2023-07-25 PROCEDURE — 84100 ASSAY OF PHOSPHORUS: CPT

## 2023-07-25 PROCEDURE — 85018 HEMOGLOBIN: CPT

## 2023-07-25 PROCEDURE — 2500000003 HC RX 250 WO HCPCS

## 2023-07-25 PROCEDURE — 30233N1 TRANSFUSION OF NONAUTOLOGOUS RED BLOOD CELLS INTO PERIPHERAL VEIN, PERCUTANEOUS APPROACH: ICD-10-PCS | Performed by: FAMILY MEDICINE

## 2023-07-25 PROCEDURE — 80053 COMPREHEN METABOLIC PANEL: CPT

## 2023-07-25 PROCEDURE — 83036 HEMOGLOBIN GLYCOSYLATED A1C: CPT

## 2023-07-25 PROCEDURE — 86850 RBC ANTIBODY SCREEN: CPT

## 2023-07-25 PROCEDURE — 86644 CMV ANTIBODY: CPT

## 2023-07-25 PROCEDURE — 3700000000 HC ANESTHESIA ATTENDED CARE: Performed by: INTERNAL MEDICINE

## 2023-07-25 PROCEDURE — 43239 EGD BIOPSY SINGLE/MULTIPLE: CPT | Performed by: INTERNAL MEDICINE

## 2023-07-25 PROCEDURE — 86923 COMPATIBILITY TEST ELECTRIC: CPT

## 2023-07-25 PROCEDURE — 80061 LIPID PANEL: CPT

## 2023-07-25 PROCEDURE — 7100000010 HC PHASE II RECOVERY - FIRST 15 MIN: Performed by: INTERNAL MEDICINE

## 2023-07-25 PROCEDURE — 84443 ASSAY THYROID STIM HORMONE: CPT

## 2023-07-25 PROCEDURE — 6360000002 HC RX W HCPCS: Performed by: FAMILY MEDICINE

## 2023-07-25 PROCEDURE — 88305 TISSUE EXAM BY PATHOLOGIST: CPT

## 2023-07-25 PROCEDURE — 3700000001 HC ADD 15 MINUTES (ANESTHESIA): Performed by: INTERNAL MEDICINE

## 2023-07-25 PROCEDURE — 36430 TRANSFUSION BLD/BLD COMPNT: CPT

## 2023-07-25 PROCEDURE — C9113 INJ PANTOPRAZOLE SODIUM, VIA: HCPCS | Performed by: FAMILY MEDICINE

## 2023-07-25 PROCEDURE — 85014 HEMATOCRIT: CPT

## 2023-07-25 PROCEDURE — 2580000003 HC RX 258

## 2023-07-25 PROCEDURE — 0DB18ZX EXCISION OF UPPER ESOPHAGUS, VIA NATURAL OR ARTIFICIAL OPENING ENDOSCOPIC, DIAGNOSTIC: ICD-10-PCS | Performed by: FAMILY MEDICINE

## 2023-07-25 PROCEDURE — P9040 RBC LEUKOREDUCED IRRADIATED: HCPCS

## 2023-07-25 RX ORDER — DIPHENHYDRAMINE HYDROCHLORIDE 50 MG/ML
12.5 INJECTION INTRAMUSCULAR; INTRAVENOUS EVERY 6 HOURS PRN
Status: DISCONTINUED | OUTPATIENT
Start: 2023-07-25 | End: 2023-07-25

## 2023-07-25 RX ORDER — FLUCONAZOLE 100 MG/1
200 TABLET ORAL 2 TIMES DAILY
Status: DISCONTINUED | OUTPATIENT
Start: 2023-07-26 | End: 2023-07-28 | Stop reason: HOSPADM

## 2023-07-25 RX ORDER — FLUCONAZOLE 2 MG/ML
400 INJECTION, SOLUTION INTRAVENOUS ONCE
Status: COMPLETED | OUTPATIENT
Start: 2023-07-25 | End: 2023-07-25

## 2023-07-25 RX ORDER — ESMOLOL HYDROCHLORIDE 10 MG/ML
INJECTION INTRAVENOUS PRN
Status: DISCONTINUED | OUTPATIENT
Start: 2023-07-25 | End: 2023-07-25 | Stop reason: SDUPTHER

## 2023-07-25 RX ORDER — DIPHENHYDRAMINE HYDROCHLORIDE 50 MG/ML
12.5 INJECTION INTRAMUSCULAR; INTRAVENOUS EVERY 6 HOURS PRN
Status: DISCONTINUED | OUTPATIENT
Start: 2023-07-25 | End: 2023-07-28 | Stop reason: HOSPADM

## 2023-07-25 RX ORDER — SODIUM CHLORIDE 9 MG/ML
INJECTION, SOLUTION INTRAVENOUS PRN
Status: DISCONTINUED | OUTPATIENT
Start: 2023-07-25 | End: 2023-07-28 | Stop reason: HOSPADM

## 2023-07-25 RX ORDER — PROPOFOL 10 MG/ML
INJECTION, EMULSION INTRAVENOUS PRN
Status: DISCONTINUED | OUTPATIENT
Start: 2023-07-25 | End: 2023-07-25 | Stop reason: SDUPTHER

## 2023-07-25 RX ORDER — SODIUM CHLORIDE, SODIUM LACTATE, POTASSIUM CHLORIDE, CALCIUM CHLORIDE 600; 310; 30; 20 MG/100ML; MG/100ML; MG/100ML; MG/100ML
INJECTION, SOLUTION INTRAVENOUS CONTINUOUS PRN
Status: DISCONTINUED | OUTPATIENT
Start: 2023-07-25 | End: 2023-07-25 | Stop reason: SDUPTHER

## 2023-07-25 RX ORDER — LIDOCAINE HYDROCHLORIDE 20 MG/ML
INJECTION, SOLUTION EPIDURAL; INFILTRATION; INTRACAUDAL; PERINEURAL PRN
Status: DISCONTINUED | OUTPATIENT
Start: 2023-07-25 | End: 2023-07-25 | Stop reason: SDUPTHER

## 2023-07-25 RX ADMIN — DIPHENHYDRAMINE HYDROCHLORIDE 12.5 MG: 50 INJECTION, SOLUTION INTRAMUSCULAR; INTRAVENOUS at 18:49

## 2023-07-25 RX ADMIN — HYDROMORPHONE HYDROCHLORIDE 1 MG: 1 INJECTION, SOLUTION INTRAMUSCULAR; INTRAVENOUS; SUBCUTANEOUS at 09:46

## 2023-07-25 RX ADMIN — ESMOLOL HYDROCHLORIDE 20 MG: 10 INJECTION INTRAVENOUS at 14:16

## 2023-07-25 RX ADMIN — LIDOCAINE HYDROCHLORIDE 60 MG: 20 INJECTION, SOLUTION EPIDURAL; INFILTRATION; INTRACAUDAL; PERINEURAL at 14:12

## 2023-07-25 RX ADMIN — ONDANSETRON 4 MG: 2 INJECTION INTRAMUSCULAR; INTRAVENOUS at 09:51

## 2023-07-25 RX ADMIN — SODIUM CHLORIDE, PRESERVATIVE FREE 10 ML: 5 INJECTION INTRAVENOUS at 21:15

## 2023-07-25 RX ADMIN — HYDROMORPHONE HYDROCHLORIDE 1 MG: 1 INJECTION, SOLUTION INTRAMUSCULAR; INTRAVENOUS; SUBCUTANEOUS at 03:05

## 2023-07-25 RX ADMIN — SODIUM CHLORIDE, SODIUM LACTATE, POTASSIUM CHLORIDE, AND CALCIUM CHLORIDE: 600; 310; 30; 20 INJECTION, SOLUTION INTRAVENOUS at 14:00

## 2023-07-25 RX ADMIN — BENZOCAINE 2 EACH: 220 SPRAY, METERED PERIODONTAL at 14:07

## 2023-07-25 RX ADMIN — PROPOFOL 40 MG: 10 INJECTION, EMULSION INTRAVENOUS at 14:12

## 2023-07-25 RX ADMIN — SODIUM CHLORIDE, PRESERVATIVE FREE 40 MG: 5 INJECTION INTRAVENOUS at 08:57

## 2023-07-25 RX ADMIN — PROPOFOL 40 MG: 10 INJECTION, EMULSION INTRAVENOUS at 14:08

## 2023-07-25 RX ADMIN — DIPHENHYDRAMINE HYDROCHLORIDE 12.5 MG: 50 INJECTION, SOLUTION INTRAMUSCULAR; INTRAVENOUS at 12:49

## 2023-07-25 RX ADMIN — SODIUM CHLORIDE, PRESERVATIVE FREE 10 ML: 5 INJECTION INTRAVENOUS at 08:57

## 2023-07-25 RX ADMIN — HYDROMORPHONE HYDROCHLORIDE 1 MG: 1 INJECTION, SOLUTION INTRAMUSCULAR; INTRAVENOUS; SUBCUTANEOUS at 06:26

## 2023-07-25 RX ADMIN — FLUCONAZOLE IN SODIUM CHLORIDE 400 MG: 2 INJECTION, SOLUTION INTRAVENOUS at 15:00

## 2023-07-25 RX ADMIN — PROPOFOL 140 MCG/KG/MIN: 10 INJECTION, EMULSION INTRAVENOUS at 14:13

## 2023-07-25 RX ADMIN — I.V. FAT EMULSION 250 ML: 20 EMULSION INTRAVENOUS at 17:40

## 2023-07-25 RX ADMIN — FAMOTIDINE: 10 INJECTION, SOLUTION INTRAVENOUS at 17:40

## 2023-07-25 RX ADMIN — ONDANSETRON 4 MG: 2 INJECTION INTRAMUSCULAR; INTRAVENOUS at 03:05

## 2023-07-25 ASSESSMENT — PAIN SCALES - GENERAL
PAINLEVEL_OUTOF10: 8
PAINLEVEL_OUTOF10: 8

## 2023-07-25 ASSESSMENT — PAIN DESCRIPTION - LOCATION
LOCATION: ABDOMEN
LOCATION: ABDOMEN

## 2023-07-25 NOTE — CONSENT
Informed Consent for Blood Component Transfusion Note    I have discussed with the patient the rationale for blood component transfusion; its benefits in treating or preventing fatigue, organ damage, or death; and its risk which includes mild transfusion reactions, rare risk of blood borne infection, or more serious but rare reactions. I have discussed the alternatives to transfusion, including the risk and consequences of not receiving transfusion. The patient had an opportunity to ask questions and had agreed to proceed with transfusion of blood components.     Electronically signed by Aneta Cifuentes MD on 7/25/23 at 10:47 AM EDT

## 2023-07-25 NOTE — PROGRESS NOTES
[id number] [place] attempted to visit PT, but PT was with Staff. [address] [id number] [facility] prayed silently for PT, PT's Family, and Staff as chart states PT is [address]

## 2023-07-25 NOTE — PROGRESS NOTES
TRANSFER - IN REPORT:    Verbal report received from TRI Murphy on [employer]  being received from PACU for routine progression of patient care      Report consisted of patient's Situation, Background, Assessment and   Recommendations(SBAR). Information from the following report(s) Nurse Handoff Report was reviewed with the receiving nurse. Opportunity for questions and clarification was provided. Assessment completed upon patient's arrival to unit and care assumed.

## 2023-07-25 NOTE — PROGRESS NOTES
TRANSFER - IN REPORT:    Verbal report received from [facility] [facility] on [employer]  being received from [phone] for ordered procedure      Report consisted of patient's Situation, Background, Assessment and   Recommendations(SBAR). Information from the following report(s) Nurse Handoff Report, MAR, Procedure Verification, and Neuro Assessment was reviewed with the receiving nurse. Opportunity for questions and clarification was provided. Assessment completed upon patient's arrival to unit and care assumed.

## 2023-07-25 NOTE — OP NOTE
Endoscopy Note          Operative Report    Patient: Skye Trivedi MRN: [id number]      YOB: 1975  Age: 50 y.o. Sex: female            Indications:  coffee ground emesis    Preoperative Evaluation: The patient was evaluated prior to the procedure in the GI lab admission area, the patient ASA was recorded . Consent was obtained from the patient with the risk of perforation bleeding and aspiration. Anesthesia: NILE-per anesthesia    Complication: None; patient tolerated the procedure well. Estimated blood loss: insignificant    Procedure: The patient was sedated into the left lateral decubitus position. Scope was advanced from the mouth to the third portion of duodenum. Scope was withdrawn to the stomach and retroflexed view was performed. Esophageal examination was performed. Findings:  -Z-line at 34 cm from incisors  -Evidence of sleeve gastrectomy noted  -Severe erosive grade D esophagitis with discrimination at the mid and lower esophagus. This is most likely the source of her coffee-ground emesis and hematemesis.  -Lower esophageal Schatzki ring noted but non obstructive and dilation not attempted due to severe esophagitis and contact bleeding.   -There was whitish plaque in the upper and middle esophagus biopsied for candidiasis. -Mild gastritis, otherwise stomach unremarkable  -Examined duodenum was unremarkable. Postoperative Diagnosis: Grade D esophagitis      Recommendations:   -Return patient to hospital [place]  -I would recommend tube feeding through either NG or eventually G-tube as TPN is not optimal with nutrition but the patient refused.  -Can advance to full liquid diet  -Keep on pantoprazole 40 mg IV twice daily while in the hospital and omeprazole 40 mg p.o. twice daily on discharge.  -I will administer 400 mg of fluconazole IV once and start on fluconazole 200 mg p.o. twice daily for 14 days for Candida esophagitis.   -Await pathology results.  -Rest of cares per

## 2023-07-25 NOTE — PROGRESS NOTES
TRANSFER - OUT REPORT:    Verbal report given to Estrada Marquez RN on [employer]  being transferred to [facility] for ordered procedure       Report consisted of patient's Situation, Background, Assessment and   Recommendations(SBAR). Information from the following report(s) Nurse Handoff Report was reviewed with the receiving nurse. Lines:   Single Lumen Implantable Port Right Subclavian (Active)   Port A Cath Status Accessed 07/25/23 1143   Criteria for Appropriate Use Limited/no vessel suitable for conventional peripheral access 07/25/23 1143   Site Assessment Clean, dry & intact 07/25/23 1143   Line Care Cap changed 07/25/23 1143   Alcohol Cap Used Yes 07/25/23 1143   Date of Last Dressing Change 07/20/23 07/25/23 1143   Dressing Type Transparent 07/25/23 1143   Dressing Status Clean, dry & intact 07/25/23 1143   Dressing Intervention New 07/20/23 1345   Date Accessed  07/20/23 07/25/23 1143   Access Attempts  1 07/20/23 1345   Access Needle Gauge 20 G 07/20/23 1345   Access Needle Length 0.75 inches 07/20/23 1345   Accessed BySavannah Thorne rn 07/20/23 1345   Single Lumen Status Flushed;Normal saline locked; Capped 07/25/23 1143   Date needle changed 07/20/23 07/20/23 1345   De-Access Date 07/13/23 07/13/23 1145   De-Access Time 1145 07/13/23 1145   De-Accessed By TRI MTZ 07/13/23 1145       Peripheral IV 07/24/23 Right Antecubital (Active)   Site Assessment Clean, dry & intact 07/25/23 1345   Line Status Infusing 07/25/23 [address] Connections checked and tightened 07/24/23 2000   Phlebitis Assessment No symptoms 07/25/23 1345   Infiltration Assessment 0 07/25/23 1345   Dressing Status Clean, dry & intact 07/25/23 1345   Dressing Type Transparent 07/24/23 2000        Opportunity for questions and clarification was provided.

## 2023-07-25 NOTE — PROGRESS NOTES
Hospitalist Progress Note   Admit Date:  2023  9:86 AM   Name:  Mercedes Muñoz   Age:  50 y.o. Sex:  female  :  1975   MRN:  [id number]   Room:  [facility]/    Presenting/Chief Complaint: Abdominal Pain and Emesis     Reason(s) for Admission: Peritoneal carcinomatosis ([address]) [C78.6]  Intractable pain [R52]  Intractable nausea and vomiting [R11.2]     Hospital Course:   50 y.o. female with medical history of metastatic carcinoma, anemia who presented with intractable pain, nausea and coffee ground emesis. It had been going on for several days. She was attempting outpatient management. The pain exceeded what she could tolerate and she presented to the ED twice. She was admitted for further evaluation and management. Subjective & 24hr Events:   Consented for blood transfusion. Hgb 6.9 this morning. Pain controlled with fentanyl, however, need to discontinue for gastric emptying study and patient in agreement. Sadly due to GI bleed and elevated liver enzymes, very few alternatives for pain control. Consulted GI, plan for EGD today. Required transfusion. Discussed plan with significant other and daughter. Had been on TPN at night, consulted nutrition and will resume tonight. Discussed with nutrition need for IVF and plan for possible metoclopramide pump. Discussed with palliative, unable to see today. Plan discussed with nurse and .     Assessment & Plan:   Intractable pain  -hold fentanyl 12  -hold dilaudid 1 q3h prn  -consult palliative care: will see tomorrow  2023: hold pain meds for gastric emptying study tomorrow     Gastroesophageal reflux disease  -pantoprazole IV bid     Chemotherapy induced nausea and vomiting  With hematemesis  -hold ondansetron  -consult gastroenterology: plan for EGD today  -gastric emptying study  2023: hold antiemetics for GES, EGD today      Immunocompromised  Low threshold for antibiotics for signs of infection      Iron deficiency anemia

## 2023-07-25 NOTE — CONSULTS
Comprehensive Nutrition Assessment    Type and Reason for Visit: Initial, Consult  Parenteral Nutrition Management (Hospitalists) and Best Practice Alert for Current Home EN/PN    Nutrition Recommendations/Plan:   Parenteral Nutrition:  Central parenteral nutrition    Central line infusion  Initiate: Dex 10% , 4.25% AA 2 L/d as 12 hours cyclic infusion   Initiate 250 ml 20% lipids daily  To provide: 1520 kcal/d (100% of needs), 85 grams of protein/d (100% of needs), 200 grams of CHO/d and 2290 ml of total volume/d. Above regimen: Intended to meet macronutrient goals  Lytes/L:   Sodium ( 100 meq NaCl), Potassium ( 15 meq KCl) Phosphorus ( 5 mmol KPO4), Calcium (4.5 meq), Magnesium 10 meq   Other additives:   MVI Monday Wednesday Friday due to national shortages, MTE  40 mg pepcid per bag  Nutrition Related Medication Management: Thiamine Not indicated  Folic Acid Not indicated  Electrolyte Replacement Protocol PRN Continue for Potassium, Phosphorus, and Magnesium   Labs:   BMP  CMP x 3 d active  Mg MWF  Phos MWF and active per MD    Triglyceride tomorrow  POC Glucoses/SSI Not indicated      Malnutrition Assessment:  Malnutrition Status: At risk for malnutrition (Comment) (PN dependent PTA d/t peritoneal carcinomatosis)  NFPE: No amie fat or muscle loss       Nutrition Assessment:  Nutrition History:  Per Oncology RD note 7/13: Pt seen during office visit w/ NP, receiving Taxol alone today d/t persistent elevated LFT's - Dr. Enrique Martinez discussed w/ Dr. Kirstin [facility]/ [facility]; pt still w/ erosive esophagitis, unable to tolerate her own secretions, nausea persists from secretions - Dr. Kirstin Castanon discussed trying to get a Reglan pump for pt as she poorly tolerates any of her oral meds to control these symptoms. Pt remains NPO/TPN dependent for estimated nutrition needs, managed by [employer], infusing 12 hrs/day - Dr. Kirstin Castanon aware of TPN supplier and infusion time for Reglan pump if able to provide.  Pt declines home

## 2023-07-25 NOTE — ANESTHESIA PRE PROCEDURE
Lab Results   Component Value Date/Time    WBC 3.0 07/25/2023 04:24 AM    RBC 2.80 07/25/2023 04:24 AM    HGB 6.9 07/25/2023 04:24 AM    HCT 24.6 07/25/2023 04:24 AM    MCV 87.9 07/25/2023 04:24 AM    RDW 23.1 07/25/2023 04:24 AM     07/25/2023 04:24 AM       CMP:   Lab Results   Component Value Date/Time     07/25/2023 04:24 AM    K 4.7 07/25/2023 04:24 AM     07/25/2023 04:24 AM    CO2 30 07/25/2023 04:24 AM    BUN 31 07/25/2023 04:24 AM    CREATININE 0.67 07/25/2023 04:24 AM    GFRAA >60 09/27/2022 02:37 AM    AGRATIO 1.1 05/12/2022 12:43 PM    LABGLOM >60 07/25/2023 04:24 AM    GLUCOSE 117 07/25/2023 04:24 AM    PROT 6.5 07/25/2023 04:24 AM    CALCIUM 8.8 07/25/2023 04:24 AM    BILITOT 1.4 07/25/2023 04:24 AM    ALKPHOS 207 07/25/2023 04:24 AM    ALKPHOS 77 05/12/2022 12:43 PM    AST 87 07/25/2023 04:24 AM     07/25/2023 04:24 AM       POC Tests: No results for input(s): POCGLU, POCNA, POCK, POCCL, POCBUN, POCHEMO, POCHCT in the last 72 hours.     Coags:   Lab Results   Component Value Date/Time    PROTIME 17.5 09/13/2022 05:56 AM    INR 1.4 09/13/2022 05:56 AM    APTT 23.0 08/10/2022 10:39 AM       HCG (If Applicable):   Lab Results   Component Value Date    HCGQUANT 7 (H) 07/13/2023        ABGs: No results found for: PHART, PO2ART, GGO4ADW, ZAL9IEJ, BEART, S4PVTOLX     Type & Screen (If Applicable):  No results found for: LABABO, LABRH    Drug/Infectious Status (If Applicable):  No results found for: HIV, HEPCAB    COVID-19 Screening (If Applicable):   Lab Results   Component Value Date/Time    COVID19 Not Detected 02/11/2022 07:14 AM    COVID19 Performed 02/11/2022 07:14 AM           Anesthesia Evaluation  Patient summary reviewed  Airway: Mallampati: II  TM distance: >3 FB   Neck ROM: full  Mouth opening: > = 3 FB   Dental: normal exam         Pulmonary:normal exam                               Cardiovascular:                      Neuro/Psych:               GI/Hepatic/Renal:   (+)

## 2023-07-25 NOTE — PROGRESS NOTES
TRANSFER - OUT REPORT:    Verbal report given to Valencia Cross RN on [employer]  being transferred to [phone] for routine post-op       Report consisted of patient's Situation, Background, Assessment and   Recommendations(SBAR). Information from the following report(s) Nurse Handoff Report, Neuro Assessment, and Event Log was reviewed with the receiving nurse. Lines:   Single Lumen Implantable Port Right Subclavian (Active)   Port A Cath Status Accessed 07/25/23 1143   Criteria for Appropriate Use Limited/no vessel suitable for conventional peripheral access 07/25/23 1143   Site Assessment Clean, dry & intact 07/25/23 1143   Line Care Cap changed 07/25/23 1143   Alcohol Cap Used Yes 07/25/23 1143   Date of Last Dressing Change 07/20/23 07/25/23 1143   Dressing Type Transparent 07/25/23 1143   Dressing Status Clean, dry & intact 07/25/23 1143   Date Accessed  07/20/23 07/25/23 1143   Single Lumen Status Flushed;Normal saline locked; Capped 07/25/23 1143       Peripheral IV 07/24/23 Right Antecubital (Active)   Site Assessment Clean, dry & intact 07/25/23 1345   Line Status Infusing 07/25/23 1345   Line Care Connections checked and tightened 07/24/23 2000   Phlebitis Assessment No symptoms 07/25/23 1345   Infiltration Assessment 0 07/25/23 1345   Dressing Status Clean, dry & intact 07/25/23 1345   Dressing Type Transparent 07/24/23 2000        Opportunity for questions and clarification was provided.       Patient transported with:  [employer]

## 2023-07-25 NOTE — PROGRESS NOTES
Physical therapy/occupational therapy - attempted evals this am. Pt. Refused, not feeling well. Will try tomorrow.

## 2023-07-25 NOTE — ANESTHESIA POSTPROCEDURE EVALUATION
Department of Anesthesiology  Postprocedure Note    Patient: Mercedes Muñoz  MRN: [id number]  YOB: 1975  Date of evaluation: 7/25/2023      Procedure Summary     Date: 07/25/23 Room / Location: [facility] ENDO 01 / [facility] ENDOSCOPY    Anesthesia Start: 0879 Anesthesia Stop: 9217    Procedure: EGD BIOPSY (Upper GI Region) Diagnosis:       Dysphagia, unspecified type      (Dysphagia, unspecified type [R13.10])    Surgeons: Aurora Valdes MD Responsible Provider: Claudia Cool MD    Anesthesia Type: TIVA ASA Status: 3          Anesthesia Type: No value filed.     Dianne Phase I: Dianne Score: 10    Dianne Phase II: Dianne Score: 9      Anesthesia Post Evaluation    Patient location during evaluation: PACU  Patient participation: complete - patient participated  Level of consciousness: awake  Airway patency: patent  Nausea & Vomiting: no nausea  Complications: no  Cardiovascular status: blood pressure returned to baseline and hemodynamically stable  Respiratory status: acceptable  Hydration status: stable  Multimodal analgesia pain management approach

## 2023-07-25 NOTE — CONSULTS
Skye Trivedi (:  1975) is a 50 y.o. female, new patient here for evaluation of the following chief complaint(s):  Abdominal Pain and Emesis         ASSESSMENT/PLAN:  Coffee-ground emesis secondary to upper GI bleed most likely secondary to esophagitis. Hemoglobin 6.9, transfuse to keep hemoglobin above 7. Continue pantoprazole 40 mg IV twice daily. Keep n.p.o., plan for EGD today. History of esophageal strictures status post dilation to 18 mm in . Elevated liver enzymes -hepatitis panel negative, this may be secondary to TPN. We will continue to monitor liver enzymes especially bilirubin worsening will consider MRCP. Abdominal ultrasound and CT abdomen from July showing biliary tree being unremarkable. Subjective   SUBJECTIVE/OBJECTIVE  44-year-old female known to have history of peritoneal carcinomatosis of unknown primary source was been treated on FOLFOX and Avastin by heme-onc team in the past and paclitaxel and ramucirumab on TPN consulted for coffee-ground emesis recurrent nausea and vomiting. The patient has been on TPN for few months and when asked about a PEG tube she refuses the idea. She reports that she does not eat orally solid food. Reports occasional dysphagia to liquids. Reports heartburn. Denies any abdominal pain. Reports that over the last week she has been having recurrent coffee-ground emesis. No blood in stool or melena. She has had an upper endoscopy with dilation based on her and her  3 months ago with [facility] (no records). Prior to that she had an upper endoscopy in  which showed grade D esophagitis and proximal esophageal stricture with Schatzki ring and evidence of gastric sleeve. She underwent dilation to 18 mm with successful mucosal disruption. The only records of her colonoscopy was in 2018 and had a normal colon.     Past Medical History:   Diagnosis Date    Abdominal pain 10/3/2022 flush 0.9 % injection 5-40 mL  5-40 mL IntraVENous PRN Wilder Mccray MD        0.9 % sodium chloride infusion   IntraVENous PRN Wilder Mccray MD        [Held by provider] enoxaparin (LOVENOX) injection 40 mg  40 mg SubCUTAneous Q24H Wilder Mccray MD   40 mg at 07/24/23 1553    polyethylene glycol (GLYCOLAX) packet 17 g  17 g Oral Daily PRN Wilder Mccray MD        potassium chloride (KLOR-CON M) extended release tablet 40 mEq  40 mEq Oral PRN Wilder Mccray MD        Or    potassium bicarb-citric acid (EFFER-K) effervescent tablet 40 mEq  40 mEq Oral PRN Wilder Mccray MD        Or    potassium chloride 10 mEq/100 mL IVPB (Peripheral Line)  10 mEq IntraVENous PRN Wilder Mccray MD        magnesium sulfate 2000 mg in 50 mL IVPB premix  2,000 mg IntraVENous PRN Wilder Mccray MD        sodium phosphate 10 mmol in sodium chloride 0.9 % 250 mL IVPB  10 mmol IntraVENous PRN Wilder Mccray MD        Or    sodium phosphate 15 mmol in sodium chloride 0.9 % 250 mL IVPB  15 mmol IntraVENous PRN Wilder Mccray MD        Or    sodium phosphate 20 mmol in sodium chloride 0.9 % 500 mL IVPB  20 mmol IntraVENous PRN Wilder Mccray MD        tuberculin injection 5 Units  5 Units IntraDERmal Once Wilder Mccray MD   5 Units at 07/24/23 1554    acetaminophen (TYLENOL) tablet 1,000 mg  1,000 mg Oral 4 times per day Wilder Mccray MD        pantoprazole (PROTONIX) 40 mg in sodium chloride (PF) 0.9 % 10 mL injection  40 mg IntraVENous BID Wilder Mccray MD   40 mg at 07/25/23 0857       Family History   Problem Relation Age of Onset    Heart Disease Maternal Grandmother     Hypertension Maternal Grandmother     Heart Disease Maternal Grandfather     Hypertension Maternal Grandfather     Pulmonary Embolism Neg Hx     Colon Cancer Neg Hx     Deep Vein Thrombosis Neg Hx     Ovarian Cancer Neg Hx     Prostate Cancer Neg Hx     Breast Cancer Neg Hx        No follow-ups on file.           An electronic signature was used to

## 2023-07-26 ENCOUNTER — APPOINTMENT (OUTPATIENT)
Dept: NUCLEAR MEDICINE | Age: 48
End: 2023-07-26
Payer: COMMERCIAL

## 2023-07-26 LAB
ALBUMIN SERPL-MCNC: 2 G/DL (ref 3.5–5)
ALBUMIN/GLOB SERPL: 0.5 (ref 0.4–1.6)
ALP SERPL-CCNC: 184 U/L (ref 50–136)
ALT SERPL-CCNC: 108 U/L (ref 12–65)
ANION GAP SERPL CALC-SCNC: 5 MMOL/L (ref 2–11)
AST SERPL-CCNC: 45 U/L (ref 15–37)
BASOPHILS # BLD: 0 K/UL (ref 0–0.2)
BASOPHILS NFR BLD: 1 % (ref 0–2)
BILIRUB SERPL-MCNC: 0.8 MG/DL (ref 0.2–1.1)
BUN SERPL-MCNC: 28 MG/DL (ref 6–23)
CALCIUM SERPL-MCNC: 8.4 MG/DL (ref 8.3–10.4)
CHLORIDE SERPL-SCNC: 111 MMOL/L (ref 101–110)
CO2 SERPL-SCNC: 27 MMOL/L (ref 21–32)
CREAT SERPL-MCNC: 0.72 MG/DL (ref 0.6–1)
DIFFERENTIAL METHOD BLD: ABNORMAL
EOSINOPHIL # BLD: 0 K/UL (ref 0–0.8)
EOSINOPHIL NFR BLD: 0 % (ref 0.5–7.8)
ERYTHROCYTE [DISTWIDTH] IN BLOOD BY AUTOMATED COUNT: 21.6 % (ref 11.9–14.6)
GLOBULIN SER CALC-MCNC: 4.1 G/DL (ref 2.8–4.5)
GLUCOSE SERPL-MCNC: 149 MG/DL (ref 65–100)
HCT VFR BLD AUTO: 27.9 % (ref 35.8–46.3)
HGB BLD-MCNC: 8 G/DL (ref 11.7–15.4)
IMM GRANULOCYTES # BLD AUTO: 0.1 K/UL (ref 0–0.5)
IMM GRANULOCYTES NFR BLD AUTO: 4 % (ref 0–5)
LYMPHOCYTES # BLD: 1.2 K/UL (ref 0.5–4.6)
LYMPHOCYTES NFR BLD: 32 % (ref 13–44)
MAGNESIUM SERPL-MCNC: 2.3 MG/DL (ref 1.8–2.4)
MCH RBC QN AUTO: 25.5 PG (ref 26.1–32.9)
MCHC RBC AUTO-ENTMCNC: 28.7 G/DL (ref 31.4–35)
MCV RBC AUTO: 88.9 FL (ref 82–102)
MM INDURATION, POC: 0 MM (ref 0–5)
MONOCYTES # BLD: 0.3 K/UL (ref 0.1–1.3)
MONOCYTES NFR BLD: 9 % (ref 4–12)
NEUTS SEG # BLD: 2 K/UL (ref 1.7–8.2)
NEUTS SEG NFR BLD: 54 % (ref 43–78)
NRBC # BLD: 0.26 K/UL (ref 0–0.2)
PHOSPHATE SERPL-MCNC: 1.9 MG/DL (ref 2.5–4.5)
PLATELET # BLD AUTO: 328 K/UL (ref 150–450)
PLATELET COMMENT: ADEQUATE
PMV BLD AUTO: 12.5 FL (ref 9.4–12.3)
POTASSIUM SERPL-SCNC: 3.8 MMOL/L (ref 3.5–5.1)
PPD, POC: NEGATIVE
PROCALCITONIN SERPL-MCNC: 0.13 NG/ML (ref 0–0.49)
PROT SERPL-MCNC: 6.1 G/DL (ref 6.3–8.2)
RBC # BLD AUTO: 3.14 M/UL (ref 4.05–5.2)
RBC MORPH BLD: ABNORMAL
SODIUM SERPL-SCNC: 143 MMOL/L (ref 133–143)
TRIGL SERPL-MCNC: 162 MG/DL (ref 35–150)
WBC # BLD AUTO: 3.6 K/UL (ref 4.3–11.1)
WBC MORPH BLD: ABNORMAL

## 2023-07-26 PROCEDURE — 80053 COMPREHEN METABOLIC PANEL: CPT

## 2023-07-26 PROCEDURE — 2580000003 HC RX 258: Performed by: FAMILY MEDICINE

## 2023-07-26 PROCEDURE — 6370000000 HC RX 637 (ALT 250 FOR IP): Performed by: INTERNAL MEDICINE

## 2023-07-26 PROCEDURE — 6360000002 HC RX W HCPCS: Performed by: FAMILY MEDICINE

## 2023-07-26 PROCEDURE — 1100000000 HC RM PRIVATE

## 2023-07-26 PROCEDURE — C9113 INJ PANTOPRAZOLE SODIUM, VIA: HCPCS | Performed by: FAMILY MEDICINE

## 2023-07-26 PROCEDURE — 36415 COLL VENOUS BLD VENIPUNCTURE: CPT

## 2023-07-26 PROCEDURE — 84145 PROCALCITONIN (PCT): CPT

## 2023-07-26 PROCEDURE — 84100 ASSAY OF PHOSPHORUS: CPT

## 2023-07-26 PROCEDURE — 84478 ASSAY OF TRIGLYCERIDES: CPT

## 2023-07-26 PROCEDURE — A4216 STERILE WATER/SALINE, 10 ML: HCPCS | Performed by: FAMILY MEDICINE

## 2023-07-26 PROCEDURE — 85025 COMPLETE CBC W/AUTO DIFF WBC: CPT

## 2023-07-26 PROCEDURE — 2500000003 HC RX 250 WO HCPCS: Performed by: FAMILY MEDICINE

## 2023-07-26 PROCEDURE — 99223 1ST HOSP IP/OBS HIGH 75: CPT | Performed by: NURSE PRACTITIONER

## 2023-07-26 PROCEDURE — 6370000000 HC RX 637 (ALT 250 FOR IP): Performed by: FAMILY MEDICINE

## 2023-07-26 PROCEDURE — 83735 ASSAY OF MAGNESIUM: CPT

## 2023-07-26 RX ORDER — ONDANSETRON 2 MG/ML
4 INJECTION INTRAMUSCULAR; INTRAVENOUS EVERY 6 HOURS PRN
Status: DISCONTINUED | OUTPATIENT
Start: 2023-07-26 | End: 2023-07-28 | Stop reason: HOSPADM

## 2023-07-26 RX ORDER — SODIUM CHLORIDE 0.9 % (FLUSH) 0.9 %
5-40 SYRINGE (ML) INJECTION PRN
Status: CANCELLED | OUTPATIENT
Start: 2023-07-27

## 2023-07-26 RX ORDER — FENTANYL 12.5 UG/1
1 PATCH TRANSDERMAL
Status: DISCONTINUED | OUTPATIENT
Start: 2023-07-26 | End: 2023-07-28 | Stop reason: HOSPADM

## 2023-07-26 RX ORDER — LORAZEPAM 2 MG/ML
1 CONCENTRATE ORAL EVERY 8 HOURS PRN
Status: DISCONTINUED | OUTPATIENT
Start: 2023-07-26 | End: 2023-07-28 | Stop reason: HOSPADM

## 2023-07-26 RX ORDER — HYDROMORPHONE HYDROCHLORIDE 1 MG/ML
0.25 INJECTION, SOLUTION INTRAMUSCULAR; INTRAVENOUS; SUBCUTANEOUS
Status: DISCONTINUED | OUTPATIENT
Start: 2023-07-26 | End: 2023-07-28 | Stop reason: HOSPADM

## 2023-07-26 RX ORDER — OXYCODONE HCL 5 MG/5 ML
10 SOLUTION, ORAL ORAL EVERY 4 HOURS PRN
Status: DISCONTINUED | OUTPATIENT
Start: 2023-07-26 | End: 2023-07-28 | Stop reason: HOSPADM

## 2023-07-26 RX ORDER — HYDROMORPHONE HYDROCHLORIDE 1 MG/ML
0.5 INJECTION, SOLUTION INTRAMUSCULAR; INTRAVENOUS; SUBCUTANEOUS
Status: DISCONTINUED | OUTPATIENT
Start: 2023-07-26 | End: 2023-07-28 | Stop reason: HOSPADM

## 2023-07-26 RX ORDER — NALOXONE HYDROCHLORIDE 0.4 MG/ML
0.4 INJECTION, SOLUTION INTRAMUSCULAR; INTRAVENOUS; SUBCUTANEOUS PRN
Status: DISCONTINUED | OUTPATIENT
Start: 2023-07-26 | End: 2023-07-28 | Stop reason: HOSPADM

## 2023-07-26 RX ADMIN — SODIUM CHLORIDE, PRESERVATIVE FREE 10 ML: 5 INJECTION INTRAVENOUS at 20:47

## 2023-07-26 RX ADMIN — FLUCONAZOLE 200 MG: 100 TABLET ORAL at 20:47

## 2023-07-26 RX ADMIN — FLUCONAZOLE 200 MG: 100 TABLET ORAL at 09:17

## 2023-07-26 RX ADMIN — DIPHENHYDRAMINE HYDROCHLORIDE 12.5 MG: 50 INJECTION, SOLUTION INTRAMUSCULAR; INTRAVENOUS at 00:50

## 2023-07-26 RX ADMIN — SODIUM CHLORIDE, PRESERVATIVE FREE 10 ML: 5 INJECTION INTRAVENOUS at 09:18

## 2023-07-26 RX ADMIN — SODIUM CHLORIDE, PRESERVATIVE FREE 40 MG: 5 INJECTION INTRAVENOUS at 20:47

## 2023-07-26 RX ADMIN — I.V. FAT EMULSION 250 ML: 20 EMULSION INTRAVENOUS at 18:13

## 2023-07-26 RX ADMIN — HYDROMORPHONE HYDROCHLORIDE 0.5 MG: 1 INJECTION, SOLUTION INTRAMUSCULAR; INTRAVENOUS; SUBCUTANEOUS at 09:16

## 2023-07-26 RX ADMIN — ONDANSETRON 4 MG: 2 INJECTION INTRAMUSCULAR; INTRAVENOUS at 09:25

## 2023-07-26 RX ADMIN — FAMOTIDINE: 10 INJECTION, SOLUTION INTRAVENOUS at 18:14

## 2023-07-26 RX ADMIN — HYDROMORPHONE HYDROCHLORIDE 0.5 MG: 1 INJECTION, SOLUTION INTRAMUSCULAR; INTRAVENOUS; SUBCUTANEOUS at 18:41

## 2023-07-26 RX ADMIN — SODIUM CHLORIDE, PRESERVATIVE FREE 40 MG: 5 INJECTION INTRAVENOUS at 09:18

## 2023-07-26 RX ADMIN — HYDROMORPHONE HYDROCHLORIDE 0.5 MG: 1 INJECTION, SOLUTION INTRAMUSCULAR; INTRAVENOUS; SUBCUTANEOUS at 12:53

## 2023-07-26 RX ADMIN — ONDANSETRON 4 MG: 2 INJECTION INTRAMUSCULAR; INTRAVENOUS at 18:41

## 2023-07-26 RX ADMIN — SODIUM PHOSPHATE, MONOBASIC, MONOHYDRATE AND SODIUM PHOSPHATE, DIBASIC, ANHYDROUS 15 MMOL: 142; 276 INJECTION, SOLUTION INTRAVENOUS at 12:51

## 2023-07-26 ASSESSMENT — PAIN SCALES - GENERAL
PAINLEVEL_OUTOF10: 7
PAINLEVEL_OUTOF10: 8
PAINLEVEL_OUTOF10: 7

## 2023-07-26 ASSESSMENT — PAIN DESCRIPTION - DESCRIPTORS
DESCRIPTORS: ACHING

## 2023-07-26 ASSESSMENT — PAIN DESCRIPTION - LOCATION
LOCATION: ABDOMEN

## 2023-07-26 NOTE — PROGRESS NOTES
Pt refused any therapy activity today & also declined having therapy follow up in the future. PT will DC services.   Antonio Juarez, PT, AURELIO

## 2023-07-26 NOTE — PROGRESS NOTES
recommendations:  \"-tube feeding through either NG or eventually G-tube as TPN is not optimal with nutrition but the patient refused.  -Can advance to full liquid diet  -Keep on pantoprazole 40 mg IV twice daily while in the hospital and omeprazole 40 mg p.o. twice daily on discharge.  -I will administer 400 mg of fluconazole IV once and start on fluconazole 200 mg p.o. twice daily for 14 days for Candida esophagitis. \"    Pt seen sitting in bed. She says has taken sips of water and apple juice today and not had any N/V. Pain is decreased. Discussed with Dr Kiko Damon. Pt will not be discharged today and to be evaluated for venting GT. Discussed need fro Phos bolus with Romie Cody RN.   Abdominal Status (last documented by RN):    Abdomen Inspection: Taut (Drain on right Abdomen), RUQ Bowel Sounds: Hypoactive, LUQ Bowel Sounds: Hypoactive, RLQ Bowel Sounds: Hypoactive, LLQ Bowel Sounds: Hypoactive    , GI Symptoms: Nausea   Pertinent Medications: Diflucan, IV Protonix bid, 40 mg pepcid in PN  1 L LR 7/24  Continuous: none  IVF: none  Electrolyte Replacement:  none  Pertinent PRN: zofran (last dose 7/26), glycolax  Pertinent Labs:   Lab Results   Component Value Date/Time     07/26/2023 04:53 AM    K 3.8 07/26/2023 04:53 AM     07/26/2023 04:53 AM    CO2 27 07/26/2023 04:53 AM    BUN 28 07/26/2023 04:53 AM    CREATININE 0.72 07/26/2023 04:53 AM    GLUCOSE 149 07/26/2023 04:53 AM    CALCIUM 8.4 07/26/2023 04:53 AM    PHOS 1.9 07/26/2023 04:53 AM    MG 2.2 07/23/2023 07:14 PM          Lab Results   Component Value Date/Time    TRIG 162 07/26/2023 04:53 AM    TRIG 178 07/25/2023 04:24 AM    TRIG 76 05/09/2023 04:13 PM     Hemoglobin A1C   Date Value Ref Range Status   07/25/2023 5.4 4.8 - 5.6 % Final     Lab Results   Component Value Date/Time     07/26/2023 04:53 AM     Lab Results   Component Value Date/Time    AST 45 07/26/2023 04:53 AM     Lab Results   Component Value Date/Time    [facility] lbs 5 oz 55 kg -   4/6/2023 121 lbs 11 oz 55.2 kg -   3/29/2023 115 lbs 52.2 kg -   3/23/2023 115 lbs 52.2 kg Standing scale   3/16/2023 119 lbs 13 oz 54.3 kg -   3/9/2023 124 lbs 3 oz 56.3 kg -   3/6/2023 117 lbs 8 oz 53.3 kg Standing scale   3/5/2023 117 lbs 8 oz 53.3 kg Standing scale   3/4/2023 118 lbs 12 oz 53.9 kg Standing scale   3/3/2023 118 lbs 13 oz 53.9 kg Standing scale   3/2/2023 118 lbs 53.5 kg Standing scale   3/2/2023 117 lbs 10 oz 53.3 kg -   2/28/2023 114 lbs 51.7 kg Stated   2/16/2023 116 lbs 52.6 kg -   2/9/2023 115 lbs 52.2 kg Standing scale   2/2/2023 114 lbs 51.7 kg -   1/26/2023 114 lbs 4 oz 51.8 kg Standing scale   1/19/2023 114 lbs 3 oz 51.8 kg -   1/18/2023 110 lbs 49.9 kg Stated   1/12/2023 115 lbs 11 oz 52.5 kg -   1/9/2023 112 lbs 6 oz 51 kg -   1/5/2023 112 lbs 10 oz 51.1 kg -   12/22/2022 109 lbs 5 oz 49.6 kg -   12/15/2022 109 lbs 49.4 kg Standing scale   12/8/2022 108 lbs 10 oz 49.3 kg -   11/25/2022 105 lbs 47.6 kg -   11/17/2022 104 lbs 6 oz 47.4 kg Standing scale   11/10/2022 103 lbs 8 oz 46.9 kg -   10/27/2022 106 lbs 3 oz 48.2 kg -   10/24/2022 106 lbs 6 oz 48.3 kg Standing scale   10/23/2022 105 lbs 10 oz 47.9 kg Standing scale   10/23/2022 105 lbs 13 oz 48 kg Standing scale   10/23/2022 113 lbs 3 oz 51.3 kg Bed scale   10/21/2022 94 lbs 42.6 kg Stated   Percent weight change:  Pt with weight gain since start of PN 10/2022       BMI Category Normal Weight (BMI 18.5-24. 9)  Estimated Daily Nutrient Needs:  Energy (kcal/day): 1681-7655 (25-30 kcal/kg) (Kcal/kg Weight used: 59.8 kg Other (Comment) (Oncology OV 7/13)  Protein (g/day): 72-90 (1.2-1.5 g/kg) Weight Used: (Other (Comment)) 59.8 kg  Fluid (ml/day):   (1 ml/kcal)    Nutrition Diagnosis:   Altered GI function related to altered GI structure as evidenced by  (metastatic peritoneal carcinamotosis, PN dependent at baseline)  Nutrition Interventions:   Food and/or Nutrient Delivery: Modify Parenteral Nutrition

## 2023-07-26 NOTE — PROGRESS NOTES
Hospitalist Progress Note   Admit Date:  2023  8:92 AM   Name:  Starlyn Cogan   Age:  50 y.o. Sex:  female  :  1975   MRN:  [id number]   Room:  [facility]/    Presenting/Chief Complaint: Abdominal Pain and Emesis     Reason(s) for Admission: Peritoneal carcinomatosis ([address]) [C78.6]  Intractable pain [R52]  Intractable nausea and vomiting [R11.2]     Hospital Course:   50 y.o. female with medical history of metastatic carcinoma, anemia who presented with intractable pain, nausea and coffee ground emesis. It had been going on for several days. She was attempting outpatient management. The pain exceeded what she could tolerate and she presented to the ED twice. She was admitted for further evaluation and management. Subjective & 24hr Events:   Hemoglobin stable overnight. Discussed EGD results with gastroenterology. Given failure to complete gastric emptying study, could consider JG tube. Discussed with nutrition services recommend consideration of JG with valve. Consulted interventional radiology and discussed with radiologist who indicated loculated lesions noted on CT are unlikely to allow tolerance of enteral feeding. IR will evaluate in person tomorrow with drain placement as feasible. If the bowel again begins moving may consider tube placement versus p.o. Palliative care has been consulted expecting to see the patient today. Assessment & Plan:   Intractable pain  -hold fentanyl 12  -hold dilaudid 1 q3h prn  -consult palliative care: will see today  2023: Restart pain meds     Gastroesophageal reflux disease  -pantoprazole IV bid     Chemotherapy induced nausea and vomiting  With hematemesis.   Failed to complete gastric emptying study  -hold ondansetron  -consult gastroenterology: Discussed options for tube placement, very few feasible options  2023: Discussed with GI, nutrition, IR, IR assessment in person tomorrow for possible drain placement

## 2023-07-26 NOTE — CONSULTS
Palliative Care    Patient: Franca Bishop MRN: [id number]  SSN: [id number]    YOB: 1975  Age: 50 y.o. Sex: female       Date of Request: 7/24/2023  Date of Consult:  7/26/2023  Reason for Consult:  pain and symptom management  Requesting Physician: Dr Macario Sears      Assessment/Plan:     Principal Diagnosis:    Pain, abdomen  R10.9    Additional Diagnoses:   Anxiety  F41.1  Fatigue, Lethargy  R53.83  Frailty  R54  Nausea/Vomiting  R11.2  Counseling, Encounter for Medical Advice  Z71.9  Encounter for Palliative Care  Z51.5    Palliative Performance Scale (PPS):       Medical Decision Making:   Reviewed and summarized notes from admission to present, OP PC notes   Discussed case with appropriate providers- Dr Harvey Shea laboratory and x-ray data from admission to present     Pt resting in bed, no distress at present, but pt noted to have bilious emesis on her gown. Helped pt change in to a clean gown. She is familiar with PC, as she sees us at [facility]. Reviewed events, and pt's current symptoms. Pt reports ongoing abdominal pain and n/v.  Her pain medications were held in hopes that pt could do a gastric emptying study, however, pt reports she vomited up the contrast.  Pt was seen in the clinic on 7/13, and started on Fentanyl TD 12 mcg q 72 hours. She reports this has been mildly helpful. She is agreeable to starting the Fentanyl TD 12 mcg q 72 hours. She has been utilizing Dilaudid IV intermittently, with good relief of her pain. We discussed adding a sublingual opioid concentrate as needed for pain, and she is agreeable. Will add Oxycodone Intensol 10 mg q 4 hours PRN. Pt also endorses anxiety about her situation, and states it keeps her from sleeping. Will add Ativan Intensol 1 mg a 8 hours PRN. Instructed pt to hold the intensol under her tongue as long as possible, and then swallow. She voiced understanding. Will continue to follow.     Will discuss findings with members of

## 2023-07-26 NOTE — PROGRESS NOTES
Pt did not rest much this shift. VSS no s/sx of distress noted. Pt was given IV benadryl for anxiety this shift. During this shift IV in LAC became occluded and was removed, unable to regain PIV access. Pt subclavian port currently infusing TPN will d/c once TPN completed. Pt up with assistance to [facility]. All needs met during hourly rounding. Safety precautions in use.

## 2023-07-27 ENCOUNTER — HOSPITAL ENCOUNTER (OUTPATIENT)
Dept: INTERVENTIONAL RADIOLOGY/VASCULAR | Age: 48
Discharge: HOME | End: 2023-07-27
Attending: FAMILY MEDICINE
Payer: COMMERCIAL

## 2023-07-27 ENCOUNTER — TRANSCRIBE ORDERS (OUTPATIENT)
Dept: INTERVENTIONAL RADIOLOGY/VASCULAR | Age: 48
End: 2023-07-27

## 2023-07-27 ENCOUNTER — HOSPITAL ENCOUNTER (OUTPATIENT)
Dept: INFUSION THERAPY | Age: 48
End: 2023-07-27

## 2023-07-27 VITALS
OXYGEN SATURATION: 100 % | HEART RATE: 100 BPM | HEIGHT: 64 IN | SYSTOLIC BLOOD PRESSURE: 152 MMHG | BODY MASS INDEX: 24.24 KG/M2 | WEIGHT: 142 LBS | RESPIRATION RATE: 16 BRPM | DIASTOLIC BLOOD PRESSURE: 67 MMHG

## 2023-07-27 DIAGNOSIS — R18.8 OTHER ASCITES: Primary | ICD-10-CM

## 2023-07-27 DIAGNOSIS — R18.8 OTHER ASCITES: ICD-10-CM

## 2023-07-27 LAB
ALBUMIN SERPL-MCNC: 2.1 G/DL (ref 3.5–5)
ALBUMIN/GLOB SERPL: 0.5 (ref 0.4–1.6)
ALP SERPL-CCNC: 179 U/L (ref 50–136)
ALT SERPL-CCNC: 89 U/L (ref 12–65)
ANION GAP SERPL CALC-SCNC: 5 MMOL/L (ref 2–11)
AST SERPL-CCNC: 40 U/L (ref 15–37)
BILIRUB SERPL-MCNC: 0.6 MG/DL (ref 0.2–1.1)
BUN SERPL-MCNC: 27 MG/DL (ref 6–23)
CALCIUM SERPL-MCNC: 8.6 MG/DL (ref 8.3–10.4)
CHLORIDE SERPL-SCNC: 115 MMOL/L (ref 101–110)
CO2 SERPL-SCNC: 26 MMOL/L (ref 21–32)
CREAT SERPL-MCNC: 0.62 MG/DL (ref 0.6–1)
DIFFERENTIAL METHOD BLD: ABNORMAL
ERYTHROCYTE [DISTWIDTH] IN BLOOD BY AUTOMATED COUNT: 21.9 % (ref 11.9–14.6)
GLOBULIN SER CALC-MCNC: 4 G/DL (ref 2.8–4.5)
GLUCOSE SERPL-MCNC: 156 MG/DL (ref 65–100)
HCT VFR BLD AUTO: 27.8 % (ref 35.8–46.3)
HGB BLD-MCNC: 8 G/DL (ref 11.7–15.4)
LYMPHOCYTES # BLD: 1 K/UL (ref 0.5–4.6)
LYMPHOCYTES NFR BLD MANUAL: 28 % (ref 16–44)
MCH RBC QN AUTO: 25.5 PG (ref 26.1–32.9)
MCHC RBC AUTO-ENTMCNC: 28.8 G/DL (ref 31.4–35)
MCV RBC AUTO: 88.5 FL (ref 82–102)
METAMYELOCYTES NFR BLD MANUAL: 4 %
MONOCYTES # BLD: 0.7 K/UL (ref 0.1–1.3)
MONOCYTES NFR BLD MANUAL: 19 % (ref 3–9)
MYELOCYTES NFR BLD MANUAL: 2 %
NEUTS BAND NFR BLD MANUAL: 22 % (ref 0–6)
NEUTS SEG # BLD: 2 K/UL (ref 1.7–8.2)
NEUTS SEG NFR BLD MANUAL: 25 % (ref 47–75)
NRBC # BLD: 0.63 K/UL (ref 0–0.2)
NRBC BLD-RTO: 1 PER 100 WBC
PHOSPHATE SERPL-MCNC: 3.9 MG/DL (ref 2.5–4.5)
PLATELET # BLD AUTO: 325 K/UL (ref 150–450)
PLATELET COMMENT: ADEQUATE
PMV BLD AUTO: 11.6 FL (ref 9.4–12.3)
POTASSIUM SERPL-SCNC: 4.1 MMOL/L (ref 3.5–5.1)
PROCALCITONIN SERPL-MCNC: 0.09 NG/ML (ref 0–0.49)
PROT SERPL-MCNC: 6.1 G/DL (ref 6.3–8.2)
RBC # BLD AUTO: 3.14 M/UL (ref 4.05–5.2)
RBC MORPH BLD: ABNORMAL
SODIUM SERPL-SCNC: 146 MMOL/L (ref 133–143)
WBC # BLD AUTO: 3.7 K/UL (ref 4.3–11.1)
WBC MORPH BLD: ABNORMAL

## 2023-07-27 PROCEDURE — 85025 COMPLETE CBC W/AUTO DIFF WBC: CPT

## 2023-07-27 PROCEDURE — 1100000000 HC RM PRIVATE

## 2023-07-27 PROCEDURE — 2580000003 HC RX 258: Performed by: FAMILY MEDICINE

## 2023-07-27 PROCEDURE — 49083 ABD PARACENTESIS W/IMAGING: CPT

## 2023-07-27 PROCEDURE — 6360000002 HC RX W HCPCS: Performed by: FAMILY MEDICINE

## 2023-07-27 PROCEDURE — 36415 COLL VENOUS BLD VENIPUNCTURE: CPT

## 2023-07-27 PROCEDURE — 80053 COMPREHEN METABOLIC PANEL: CPT

## 2023-07-27 PROCEDURE — 2500000003 HC RX 250 WO HCPCS: Performed by: FAMILY MEDICINE

## 2023-07-27 PROCEDURE — 84100 ASSAY OF PHOSPHORUS: CPT

## 2023-07-27 PROCEDURE — 84145 PROCALCITONIN (PCT): CPT

## 2023-07-27 PROCEDURE — A4216 STERILE WATER/SALINE, 10 ML: HCPCS | Performed by: FAMILY MEDICINE

## 2023-07-27 PROCEDURE — 99222 1ST HOSP IP/OBS MODERATE 55: CPT | Performed by: INTERNAL MEDICINE

## 2023-07-27 PROCEDURE — 6370000000 HC RX 637 (ALT 250 FOR IP): Performed by: INTERNAL MEDICINE

## 2023-07-27 PROCEDURE — C9113 INJ PANTOPRAZOLE SODIUM, VIA: HCPCS | Performed by: FAMILY MEDICINE

## 2023-07-27 PROCEDURE — 2500000003 HC RX 250 WO HCPCS: Performed by: PHYSICIAN ASSISTANT

## 2023-07-27 RX ORDER — LIDOCAINE HYDROCHLORIDE 20 MG/ML
INJECTION, SOLUTION INFILTRATION; PERINEURAL PRN
Status: DISCONTINUED | OUTPATIENT
Start: 2023-07-27 | End: 2023-07-31 | Stop reason: HOSPADM

## 2023-07-27 RX ADMIN — HYDROMORPHONE HYDROCHLORIDE 0.5 MG: 1 INJECTION, SOLUTION INTRAMUSCULAR; INTRAVENOUS; SUBCUTANEOUS at 04:53

## 2023-07-27 RX ADMIN — HYDROMORPHONE HYDROCHLORIDE 0.5 MG: 1 INJECTION, SOLUTION INTRAMUSCULAR; INTRAVENOUS; SUBCUTANEOUS at 00:26

## 2023-07-27 RX ADMIN — ONDANSETRON 4 MG: 2 INJECTION INTRAMUSCULAR; INTRAVENOUS at 00:26

## 2023-07-27 RX ADMIN — FLUCONAZOLE 200 MG: 100 TABLET ORAL at 14:46

## 2023-07-27 RX ADMIN — SODIUM CHLORIDE, PRESERVATIVE FREE 40 MG: 5 INJECTION INTRAVENOUS at 14:48

## 2023-07-27 RX ADMIN — ONDANSETRON 4 MG: 2 INJECTION INTRAMUSCULAR; INTRAVENOUS at 06:34

## 2023-07-27 RX ADMIN — DIPHENHYDRAMINE HYDROCHLORIDE 12.5 MG: 50 INJECTION, SOLUTION INTRAMUSCULAR; INTRAVENOUS at 05:24

## 2023-07-27 RX ADMIN — ONDANSETRON 4 MG: 2 INJECTION INTRAMUSCULAR; INTRAVENOUS at 14:55

## 2023-07-27 RX ADMIN — SODIUM CHLORIDE, PRESERVATIVE FREE 10 ML: 5 INJECTION INTRAVENOUS at 14:53

## 2023-07-27 RX ADMIN — SODIUM CHLORIDE, PRESERVATIVE FREE 40 MG: 5 INJECTION INTRAVENOUS at 20:49

## 2023-07-27 RX ADMIN — SODIUM CHLORIDE, PRESERVATIVE FREE 10 ML: 5 INJECTION INTRAVENOUS at 20:49

## 2023-07-27 RX ADMIN — LIDOCAINE HYDROCHLORIDE 10 ML: 20 INJECTION, SOLUTION INFILTRATION; PERINEURAL at 09:25

## 2023-07-27 RX ADMIN — HYDROMORPHONE HYDROCHLORIDE 0.5 MG: 1 INJECTION, SOLUTION INTRAMUSCULAR; INTRAVENOUS; SUBCUTANEOUS at 14:47

## 2023-07-27 RX ADMIN — I.V. FAT EMULSION 250 ML: 20 EMULSION INTRAVENOUS at 18:36

## 2023-07-27 RX ADMIN — FAMOTIDINE: 10 INJECTION, SOLUTION INTRAVENOUS at 18:32

## 2023-07-27 ASSESSMENT — PAIN DESCRIPTION - LOCATION
LOCATION: ABDOMEN

## 2023-07-27 ASSESSMENT — PAIN - FUNCTIONAL ASSESSMENT: PAIN_FUNCTIONAL_ASSESSMENT: 0-10

## 2023-07-27 ASSESSMENT — PAIN SCALES - GENERAL
PAINLEVEL_OUTOF10: 8
PAINLEVEL_OUTOF10: 6
PAINLEVEL_OUTOF10: 4
PAINLEVEL_OUTOF10: 8
PAINLEVEL_OUTOF10: 7
PAINLEVEL_OUTOF10: 5
PAINLEVEL_OUTOF10: 5

## 2023-07-27 ASSESSMENT — PAIN DESCRIPTION - DESCRIPTORS: DESCRIPTORS: ACHING

## 2023-07-27 NOTE — CARE COORDINATION
CM chart review; discussed in IDT rounds. [facility] notes reviewed; patient's insurance policy termed 7/1; she is working on trying to get COBRA. She has submitted a financial assistance application w/ the hospital. Undetermined discharge needs at this time. CM will continue to follow.

## 2023-07-27 NOTE — BRIEF OP NOTE
Department of Interventional Radiology  [phone]        Interventional Radiology Brief Procedure Note    Patient: Franklin Stauffer MRN: [id number]  SSN: [id number]    YOB: 1975  Age: 50 y.o. Sex: female      Date of Procedure: 7/27/2023    Pre-Procedure Diagnosis: Ovarian adenocarcinoma, malignant ascites, and a functioning abdominal PeritX  catheter which was originally placed September 2022    Post-Procedure Diagnosis: SAME    Procedure(s): Paracentesis    Brief Description of Procedure: Ultrasound-guided paracentesis performed on the left abdomen and a total of 1500 cc of thin dark brown fluid was removed. The right sided PeritX drain was not functioning properly and the external portion of the tubing appeared to be clogged with thick fluid/debris. The drain was flushed with 10 cc sterile saline and then was hooked up to suction and an additional 600 cc was drained from the right abdomen using the PeritX drain    Performed By: ODALYS Becker     Assistants: None    Anesthesia:Lidocaine    Estimated Blood Loss: Less than 10ml    Specimens: None    Implants:  PeritX drain    Findings:  Moderate volume malignant ascites    Complications: None    Recommendations: Leave Dermabond in place for at least 7 days    Follow Up: As needed    Signed By: Jemma Steward, [address] [place] [address] Road     July 27, 2023

## 2023-07-27 NOTE — PROGRESS NOTES
Comprehensive Nutrition Assessment    Type and Reason for Visit: Reassess  Parenteral Nutrition Management (Hospitalists)    Nutrition Recommendations/Plan:   Parenteral Nutrition:  Central parenteral nutrition    Central line infusion  Continue: Dex 10% , 4.25% AA 2 L/d as 12 hour cyclic infusion   Continue 250 ml 20% lipids daily  To provide: 1520 kcal/d (100% of needs), 85 grams of protein/d (100% of needs), 200 grams of CHO/d and 2290 ml of total volume/d. Above regimen: Intended to meet macronutrient goals  Lytes/L:   Sodium ( 75 meq NaCl), Potassium ( 15 meq KAcetate) Phosphorus ( 12.5 mmol KPO4), Calcium (4.5 meq), Magnesium 12 meq   Other additives:   MVI Monday Wednesday Friday due to national shortages, MTE  40 mg pepcid per bag. Continue this dosage per Dr Thiago Contreras since pt is also receiving 40 mg IV Protonix bid. Nutrition Related Medication Management: Thiamine Not indicated  Folic Acid Not indicated  Electrolyte Replacement Protocol PRN Continue for Potassium, Phosphorus, and Magnesium   Labs:   BMP daily  Mg MWF  Phos MWF    Triglyceride weekly on Wednesdays  POC Glucoses/SSI Not indicated      Malnutrition Assessment:  Malnutrition Status: At risk for malnutrition (Comment) (PN dependent PTA d/t peritoneal carcinomatosis)  NFPE: No amie fat or muscle loss, general thinness       Nutrition Assessment:  Nutrition History:  Per Oncology RD note 7/13: Pt seen during office visit w/ NP, receiving Taxol alone today d/t persistent elevated LFT's - Dr. Mala Briggs discussed w/ [facility]. [facility]/ [facility]; pt still w/ erosive esophagitis, unable to tolerate her own secretions, nausea persists from secretions - Dr. Lorenzo Cuadra discussed trying to get a Reglan pump for pt as she poorly tolerates any of her oral meds to control these symptoms.   Pt remains NPO/TPN dependent for estimated nutrition needs, managed by [facility], infusing 12 hrs/day - Dr. Lorenzo Cuadra aware of TPN supplier and infusion time for Reglan pump if 18.5-24. 9)  Estimated Daily Nutrient Needs:  Energy (kcal/day): 3191-5496 (25-30 kcal/kg) (Kcal/kg Weight used: 59.8 kg Other (Comment) (Oncology OV 7/13)  Protein (g/day): 72-90 (1.2-1.5 g/kg) Weight Used: (Other (Comment)) 59.8 kg  Fluid (ml/day):   (1 ml/kcal)    Nutrition Diagnosis:   Altered GI function related to altered GI structure as evidenced by  (metastatic peritoneal carcinamotosis, PN dependent at baseline)  Nutrition Interventions:   Food and/or Nutrient Delivery: Modify Parenteral Nutrition     Coordination of Nutrition Care: Continue to monitor while inpatient, Interdisciplinary Rounds       Goals:   Previous Goal Met: Goal(s) Achieved  Active Goal:  (PN to meet estimated needs)       Nutrition Monitoring and Evaluation:      Food/Nutrient Intake Outcomes: Parenteral Nutrition Intake/Tolerance  Physical Signs/Symptoms Outcomes: Biochemical Data, Fluid Status or Edema, Weight, GI Status    Discharge Planning:    Parenteral Nutrition    Jessica Chao RD,LD, [facility]

## 2023-07-27 NOTE — PROGRESS NOTES
Hospitalist Progress Note   Admit Date:  2023  3:67 AM   Name:  Gianna Huntlye   Age:  50 y.o. Sex:  female  :  1975   MRN:  [id number]   Room:  [facility]/01    Presenting/Chief Complaint: Abdominal Pain and Emesis     Reason(s) for Admission: Peritoneal carcinomatosis ([address]) [C78.6]  Intractable pain [R52]  Intractable nausea and vomiting [R11.2]     Hospital Course:   50 y.o. female with medical history of metastatic carcinoma, anemia who presented with intractable pain, nausea and coffee ground emesis. It had been going on for several days. She was attempting outpatient management. The pain exceeded what she could tolerate and she presented to the ED twice. She was admitted for further evaluation and management. Subjective & 24hr Events:   Went downtown for paracentesis today. Drained over 2 L. Reports much less discomfort in abdomen and willingness to try to advance to CLD. If she cannot tolerate clear liquids she may remain n.p.o. and be able to discharge home on TPN. Discussed plan with patient, nurse, , oncologist.    Assessment & Plan:   Intractable pain  -fentanyl 12  -dilaudid 1 q3h prn  -consult palliative care: will see today  2023: Pain adequately controlled     Gastroesophageal reflux disease  -pantoprazole IV bid     Chemotherapy induced nausea and vomiting  With hematemesis. Failed to complete gastric emptying study  -hold ondansetron  -consult gastroenterology: Discussed options for tube placement, very few feasible options  2023: Discussed with oncology, nutritionist.  Will trial CLD.      Immunocompromised  Low threshold for antibiotics for signs of infection      Iron deficiency anemia due to chronic blood loss  Transfused pRBC 1U   -transfuse Hgb < 7  -transfuse pRBC 1U  2023: Hgb 8.0, continue to monitor     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  -contact oncology services as needed    Paraesophageal hernia  -consult nutrition

## 2023-07-27 NOTE — OR NURSING
Interventional Radiology Post Paracentesis/Thoracentesis Note    7/27/2023    Procedure(s): Ultrasound guided Therapeutic Paracentesis Performed with 8 Angolan catheter total volume 1500 ml. Preliminary Findings: small  red/brown thick fluid . Complications: None    Plan:  Observation, check labs if drawn.           Chest X-Ray:  no    Full dictated report to follow

## 2023-07-28 VITALS
TEMPERATURE: 97.9 F | OXYGEN SATURATION: 99 % | SYSTOLIC BLOOD PRESSURE: 100 MMHG | HEIGHT: 64 IN | RESPIRATION RATE: 16 BRPM | DIASTOLIC BLOOD PRESSURE: 70 MMHG | WEIGHT: 142.3 LBS | HEART RATE: 95 BPM | BODY MASS INDEX: 24.3 KG/M2

## 2023-07-28 PROBLEM — R52 INTRACTABLE PAIN: Status: RESOLVED | Noted: 2023-07-24 | Resolved: 2023-07-28

## 2023-07-28 LAB
ANION GAP SERPL CALC-SCNC: 6 MMOL/L (ref 2–11)
BUN SERPL-MCNC: 24 MG/DL (ref 6–23)
CALCIUM SERPL-MCNC: 8.5 MG/DL (ref 8.3–10.4)
CHLORIDE SERPL-SCNC: 113 MMOL/L (ref 101–110)
CO2 SERPL-SCNC: 26 MMOL/L (ref 21–32)
CREAT SERPL-MCNC: 0.52 MG/DL (ref 0.6–1)
DIFFERENTIAL METHOD BLD: ABNORMAL
ERYTHROCYTE [DISTWIDTH] IN BLOOD BY AUTOMATED COUNT: 22.1 % (ref 11.9–14.6)
GLUCOSE SERPL-MCNC: 144 MG/DL (ref 65–100)
HCT VFR BLD AUTO: 26 % (ref 35.8–46.3)
HGB BLD-MCNC: 7.5 G/DL (ref 11.7–15.4)
LYMPHOCYTES # BLD: 1.6 K/UL (ref 0.5–4.6)
LYMPHOCYTES NFR BLD MANUAL: 29 % (ref 16–44)
MAGNESIUM SERPL-MCNC: 2.3 MG/DL (ref 1.8–2.4)
MCH RBC QN AUTO: 25.4 PG (ref 26.1–32.9)
MCHC RBC AUTO-ENTMCNC: 28.8 G/DL (ref 31.4–35)
MCV RBC AUTO: 88.1 FL (ref 82–102)
METAMYELOCYTES NFR BLD MANUAL: 2 %
MONOCYTES # BLD: 0.6 K/UL (ref 0.1–1.3)
MONOCYTES NFR BLD MANUAL: 11 % (ref 3–9)
NEUTS BAND NFR BLD MANUAL: 18 % (ref 0–6)
NEUTS SEG # BLD: 3.3 K/UL (ref 1.7–8.2)
NEUTS SEG NFR BLD MANUAL: 40 % (ref 47–75)
NRBC # BLD: 0.57 K/UL (ref 0–0.2)
PHOSPHATE SERPL-MCNC: 4.1 MG/DL (ref 2.5–4.5)
PLATELET # BLD AUTO: 283 K/UL (ref 150–450)
PLATELET COMMENT: ADEQUATE
PMV BLD AUTO: 12 FL (ref 9.4–12.3)
POTASSIUM SERPL-SCNC: 4.1 MMOL/L (ref 3.5–5.1)
RBC # BLD AUTO: 2.95 M/UL (ref 4.05–5.2)
RBC MORPH BLD: ABNORMAL
SODIUM SERPL-SCNC: 145 MMOL/L (ref 133–143)
WBC # BLD AUTO: 5.5 K/UL (ref 4.3–11.1)
WBC MORPH BLD: ABNORMAL

## 2023-07-28 PROCEDURE — 36415 COLL VENOUS BLD VENIPUNCTURE: CPT

## 2023-07-28 PROCEDURE — 6370000000 HC RX 637 (ALT 250 FOR IP): Performed by: INTERNAL MEDICINE

## 2023-07-28 PROCEDURE — 2580000003 HC RX 258: Performed by: FAMILY MEDICINE

## 2023-07-28 PROCEDURE — 6360000002 HC RX W HCPCS: Performed by: FAMILY MEDICINE

## 2023-07-28 PROCEDURE — 84100 ASSAY OF PHOSPHORUS: CPT

## 2023-07-28 PROCEDURE — 80048 BASIC METABOLIC PNL TOTAL CA: CPT

## 2023-07-28 PROCEDURE — 85025 COMPLETE CBC W/AUTO DIFF WBC: CPT

## 2023-07-28 PROCEDURE — C9113 INJ PANTOPRAZOLE SODIUM, VIA: HCPCS | Performed by: FAMILY MEDICINE

## 2023-07-28 PROCEDURE — 2500000003 HC RX 250 WO HCPCS: Performed by: FAMILY MEDICINE

## 2023-07-28 PROCEDURE — 83735 ASSAY OF MAGNESIUM: CPT

## 2023-07-28 RX ORDER — ONDANSETRON 4 MG/1
8 TABLET, ORALLY DISINTEGRATING ORAL 3 TIMES DAILY PRN
Qty: 30 TABLET | Refills: 0 | Status: ON HOLD | OUTPATIENT
Start: 2023-07-28

## 2023-07-28 RX ORDER — PANTOPRAZOLE SODIUM 40 MG/1
40 TABLET, DELAYED RELEASE ORAL 2 TIMES DAILY
Qty: 60 TABLET | Refills: 0 | Status: ON HOLD | OUTPATIENT
Start: 2023-07-28

## 2023-07-28 RX ADMIN — ONDANSETRON 4 MG: 2 INJECTION INTRAMUSCULAR; INTRAVENOUS at 06:07

## 2023-07-28 RX ADMIN — FLUCONAZOLE 200 MG: 100 TABLET ORAL at 09:29

## 2023-07-28 RX ADMIN — HYDROMORPHONE HYDROCHLORIDE 0.5 MG: 1 INJECTION, SOLUTION INTRAMUSCULAR; INTRAVENOUS; SUBCUTANEOUS at 06:07

## 2023-07-28 RX ADMIN — SODIUM CHLORIDE, PRESERVATIVE FREE 40 MG: 5 INJECTION INTRAVENOUS at 09:28

## 2023-07-28 RX ADMIN — ONDANSETRON 4 MG: 2 INJECTION INTRAMUSCULAR; INTRAVENOUS at 00:18

## 2023-07-28 RX ADMIN — HYDROMORPHONE HYDROCHLORIDE 0.5 MG: 1 INJECTION, SOLUTION INTRAMUSCULAR; INTRAVENOUS; SUBCUTANEOUS at 00:18

## 2023-07-28 RX ADMIN — FLUCONAZOLE 200 MG: 100 TABLET ORAL at 00:18

## 2023-07-28 ASSESSMENT — PAIN SCALES - GENERAL
PAINLEVEL_OUTOF10: 7
PAINLEVEL_OUTOF10: 8

## 2023-07-28 ASSESSMENT — PAIN DESCRIPTION - LOCATION: LOCATION: ABDOMEN

## 2023-07-28 NOTE — PLAN OF CARE
Problem: Discharge Planning  Goal: Discharge to home or other facility with appropriate resources  7/28/2023 0357 by Alyssa Louis RN  Outcome: Progressing  7/27/2023 1942 by Fallon Hopkins RN  Outcome: Progressing     Problem: Pain  Goal: Verbalizes/displays adequate comfort level or baseline comfort level  7/28/2023 0357 by Alyssa Louis RN  Outcome: Progressing  7/27/2023 1942 by Fallon Hopkins RN  Outcome: Progressing     Problem: Skin/Tissue Integrity  Goal: Absence of new skin breakdown  Description: 1. Monitor for areas of redness and/or skin breakdown  2. Assess vascular access sites hourly  3. Every 4-6 hours minimum:  Change oxygen saturation probe site  4. Every 4-6 hours:  If on nasal continuous positive airway pressure, respiratory therapy assess nares and determine need for appliance change or resting period.   7/28/2023 0357 by Alyssa Louis RN  Outcome: Progressing  7/27/2023 1942 by Fallon Hopkins RN  Outcome: Progressing     Problem: Safety - Adult  Goal: Free from fall injury  7/27/2023 1942 by Fallon Hopkins RN  Outcome: Progressing

## 2023-07-28 NOTE — DISCHARGE SUMMARY
Hospitalist Discharge Summary   Admit Date:  2023  9:37 AM   DC Note date:   Name:  Eve Botello   Age:  50 y.o. Sex:  female  :  1975   MRN:  [id number]   Room:  [facility]  PCP:  None None    Presenting Complaint: Abdominal Pain and Emesis     Initial Admission Diagnosis: Peritoneal carcinomatosis ([address]) [C78.6]  Intractable pain [R52]  Intractable nausea and vomiting [R11.2]     Problem List for this Hospitalization (present on admission):    Principal Problem (Resolved): Intractable pain  Active Problems:    GERD (gastroesophageal reflux disease)    Chemotherapy induced nausea and vomiting    Immunocompromised (HCC)    Paraesophageal hernia    Iron deficiency anemia due to chronic blood loss    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 [facility])      Hospital Course:  50 y.o. female with medical history of metastatic carcinoma, anemia who presented with intractable pain, nausea and coffee ground emesis. It had been going on for several days. She was attempting outpatient management. The pain exceeded what she could tolerate and she presented to the ED twice. She was admitted for further evaluation and management. Underwent EGD with evidence of esophagitis. Underwent paracentesis with large drainage. Hematology was consult and confirmed outpatient follow up plan. Was able to tolerate some liquids but needed to remain on TPN. Determined to be appropriate for discharge . Disposition: Home  Diet: ADULT DIET; Clear Liquid  PN-Adult Premix 4.25/10 - Central Line  Code Status: Full Code    Follow Ups:   Contact information for follow-up providers     Noah Duran MD. Schedule an appointment as soon as possible for a   visit in 1 week(s).     Specialties: Hematology, Hematology and Oncology, Oncology  Why: Transition of Care Management  Contact information:  [facility] [address]                   Contact information for after-discharge

## 2023-07-28 NOTE — CARE COORDINATION
Patient to be discharged home today. Home infusion services for TPN have been resumed w/ [facility]. Infusion nurse met w/ patient at bedside; patient has one bag of TPN at home and new bags will be delivered tonight or tomorrow. CM spoke w/ patient at beside; she verbalized understanding of the above. Patient now has insurance coverage (COBRA) effective 7/1/23. Patient denies need for home health services. She states family will provide transportation home this afternoon. No further discharge needs identified. CM will remain available until discharge.

## 2023-07-28 NOTE — PLAN OF CARE
Problem: Discharge Planning  Goal: Discharge to home or other facility with appropriate resources  7/28/2023 1057 by Jessy Levi RN  Outcome: Progressing  7/28/2023 0357 by Chasity Logan RN  Outcome: Progressing     Problem: Pain  Goal: Verbalizes/displays adequate comfort level or baseline comfort level  7/28/2023 1057 by Jessy Levi RN  Outcome: Progressing  7/28/2023 0357 by Chasity Logan RN  Outcome: Progressing     Problem: Skin/Tissue Integrity  Goal: Absence of new skin breakdown  Description: 1. Monitor for areas of redness and/or skin breakdown  2. Assess vascular access sites hourly  3. Every 4-6 hours minimum:  Change oxygen saturation probe site  4. Every 4-6 hours:  If on nasal continuous positive airway pressure, respiratory therapy assess nares and determine need for appliance change or resting period.   7/28/2023 1057 by Jessy Levi RN  Outcome: Progressing  7/28/2023 0357 by Chasity Logan RN  Outcome: Progressing     Problem: Safety - Adult  Goal: Free from fall injury  Outcome: Progressing

## 2023-07-29 LAB
ABO + RH BLD: NORMAL
BLD PROD TYP BPU: NORMAL
BLD PROD TYP BPU: NORMAL
BLOOD BANK CMNT PATIENT-IMP: NORMAL
BLOOD BANK DISPENSE STATUS: NORMAL
BLOOD BANK DISPENSE STATUS: NORMAL
BLOOD GROUP ANTIBODIES SERPL: NORMAL
BPU ID: NORMAL
BPU ID: NORMAL
CROSSMATCH RESULT: NORMAL
CROSSMATCH RESULT: NORMAL
SPECIMEN EXP DATE BLD: NORMAL
UNIT DIVISION: 0
UNIT DIVISION: 0

## 2023-07-31 ENCOUNTER — TELEPHONE (OUTPATIENT)
Dept: ONCOLOGY | Age: 48
End: 2023-07-31

## 2023-07-31 ENCOUNTER — CARE COORDINATION (OUTPATIENT)
Dept: CARE COORDINATION | Facility: CLINIC | Age: 48
End: 2023-07-31

## 2023-07-31 NOTE — TELEPHONE ENCOUNTER
Subjective    Chief Complaint: need verbal order to continue [address] [facility],[address]  Details of Complaint: n/a        Plan/Intervention    Proposed Plan: verbal order given.

## 2023-07-31 NOTE — TELEPHONE ENCOUNTER
Physician provider: Deep Araiza MD  Reason for today's call: verbal orders  Last office visit:7/13/2023    Patient notified that their information will be routed to the [facility] clinical triage team for review. Patient is advised that they will receive a phone call from the triage department. Isha Sherman calling from [facility] regarding  verbal orders for continues care.     Weekly labs  Needle change      Isha Sherman   [phone]

## 2023-07-31 NOTE — CARE COORDINATION
Care Transitions Outreach Attempt    Call within 2 business days of discharge: Yes   Attempted to reach patient for transitions of care follow up. Unable to reach patient. Patient: Johnny Durand Patient : 1975 MRN: [id number]    Last Discharge [address] [facility],[address]       Date Complaint Diagnosis Description Type Department Provider    23 Abdominal Pain; Emesis Peritoneal carcinomatosis ([address]) . .. ED to Hosp-Admission (Discharged) (ADMITTED) Miguel Angel Clayton MD; Cory Potts. .. Was this an external facility discharge?  No Discharge Facility: [facility]    Noted following upcoming appointments from discharge chart review:   [facility] follow up appointment(s):   Future Appointments   Date Time Provider [address]   2023  8:00 AM SFE NM GE SCAN RM [facility] SFE   2023  8:30 AM SFE NM GE SCAN RM SF[facility]M SFE   8/3/2023 10:15 AM LAB CK [facility]   8/3/2023 10:45 AM Stephie Eaton MD [facility] AMB   8/3/2023 11:30 AM POD3C [facility]     Non-[facility] follow up appointment(s): n/a

## 2023-08-01 ENCOUNTER — CARE COORDINATION (OUTPATIENT)
Dept: CARE COORDINATION | Facility: CLINIC | Age: 48
End: 2023-08-01

## 2023-08-01 NOTE — CARE COORDINATION
Care Transitions Outreach Attempt    Call within 2 business days of discharge: Yes   Attempted to reach patient for transitions of care follow up. Unable to reach patient. Patient: Danika Acevedo Patient : 1975 MRN: [id number]    Last Discharge [address]       Date Complaint Diagnosis Description Type Department Provider    23 Abdominal Pain; Emesis Peritoneal carcinomatosis ([address] ) . .. ED to Hosp-Admission (Discharged) (ADMITTED) Karen Mathis MD; Brittni Griffiths. .. Was this an external facility discharge?  No Discharge Facility: [facility]    Noted following upcoming appointments from discharge chart review:   [id number] follow up appointment(s):   Future Appointments   Date Time Provider [address]   8/3/2023 10:15 AM LAB CK [facility]   8/3/2023 10:45 AM Deep Araiza MD [facility] AMB   8/3/2023 11:30 AM POD3C [facility]     Non-[facility] follow up appointment(s): n/a

## 2023-08-02 ENCOUNTER — CARE COORDINATION (OUTPATIENT)
Dept: CARE COORDINATION | Facility: CLINIC | Age: 48
End: 2023-08-02

## 2023-08-02 DIAGNOSIS — C78.6 METASTASIS TO PERITONEUM OF UNKNOWN PRIMARY (HCC): Primary | ICD-10-CM

## 2023-08-02 DIAGNOSIS — C80.1 METASTASIS TO PERITONEUM OF UNKNOWN PRIMARY (HCC): Primary | ICD-10-CM

## 2023-08-02 NOTE — CARE COORDINATION
Care Transitions Outreach Attempt    Call within 2 business days of discharge: Yes   Attempted to reach patient for transitions of care follow up. Unable to reach patient. Patient: Merline Choi Patient : 1975 MRN: [id number]    Last Discharge [address] [facility]       Date Complaint Diagnosis Description Type Department Provider    23 Abdominal Pain; Emesis Peritoneal carcinomatosis ([address]) . .. ED to Hosp-Admission (Discharged) (ADMITTED) Catalina Espinal MD; Jorge Alberto Clemens. .. Was this an external facility discharge?  No Discharge Facility: [facility]    Noted following upcoming appointments from discharge chart review:   [facility] follow up appointment(s):   Future Appointments   Date Time Provider [address]   8/3/2023 10:15 AM LAB CK [facility]   8/3/2023 10:45 AM Sabrina Dominguez MD [facility] AMB   8/3/2023 11:30 AM POD3C [facility]     Non-[facility] follow up appointment(s): n/a

## 2023-08-03 ENCOUNTER — HOSPITAL ENCOUNTER (OUTPATIENT)
Dept: INFUSION THERAPY | Age: 48
Discharge: HOME | End: 2023-08-03
Payer: COMMERCIAL

## 2023-08-03 ENCOUNTER — OFFICE VISIT (OUTPATIENT)
Dept: ONCOLOGY | Age: 48
End: 2023-08-03

## 2023-08-03 ENCOUNTER — OFFICE VISIT (OUTPATIENT)
Dept: ONCOLOGY | Age: 48
End: 2023-08-03
Payer: COMMERCIAL

## 2023-08-03 VITALS
BODY MASS INDEX: 24.37 KG/M2 | TEMPERATURE: 98.7 F | SYSTOLIC BLOOD PRESSURE: 115 MMHG | HEART RATE: 110 BPM | HEIGHT: 64 IN | RESPIRATION RATE: 14 BRPM | OXYGEN SATURATION: 99 % | DIASTOLIC BLOOD PRESSURE: 80 MMHG

## 2023-08-03 VITALS — WEIGHT: 138.1 LBS | BODY MASS INDEX: 23.7 KG/M2

## 2023-08-03 DIAGNOSIS — C80.1 METASTASIS TO PERITONEUM OF UNKNOWN PRIMARY (HCC): Primary | ICD-10-CM

## 2023-08-03 DIAGNOSIS — C78.6 METASTASIS TO PERITONEUM OF UNKNOWN PRIMARY (HCC): ICD-10-CM

## 2023-08-03 DIAGNOSIS — C78.6 METASTASIS TO PERITONEUM OF UNKNOWN PRIMARY (HCC): Primary | ICD-10-CM

## 2023-08-03 DIAGNOSIS — C80.1 CARCINOMA OF UNKNOWN PRIMARY (HCC): ICD-10-CM

## 2023-08-03 DIAGNOSIS — C80.1 METASTASIS TO PERITONEUM OF UNKNOWN PRIMARY (HCC): ICD-10-CM

## 2023-08-03 DIAGNOSIS — Z78.9 ON TOTAL PARENTERAL NUTRITION (TPN): ICD-10-CM

## 2023-08-03 DIAGNOSIS — C78.6 PERITONEAL CARCINOMATOSIS (HCC): ICD-10-CM

## 2023-08-03 DIAGNOSIS — G89.3 CANCER ASSOCIATED PAIN: ICD-10-CM

## 2023-08-03 DIAGNOSIS — Z00.8 NUTRITIONAL ASSESSMENT: Primary | ICD-10-CM

## 2023-08-03 DIAGNOSIS — C80.1 CARCINOMA OF UNKNOWN PRIMARY (HCC): Primary | ICD-10-CM

## 2023-08-03 DIAGNOSIS — K20.90 ESOPHAGITIS: ICD-10-CM

## 2023-08-03 LAB
ALBUMIN SERPL-MCNC: 1.8 G/DL (ref 3.5–5)
ALBUMIN/GLOB SERPL: 0.5 (ref 0.4–1.6)
ALP SERPL-CCNC: 189 U/L (ref 50–136)
ALT SERPL-CCNC: 42 U/L (ref 12–65)
ANION GAP SERPL CALC-SCNC: 6 MMOL/L (ref 2–11)
AST SERPL-CCNC: 41 U/L (ref 15–37)
BASOPHILS # BLD: 0 K/UL (ref 0–0.2)
BASOPHILS NFR BLD: 0 % (ref 0–2)
BILIRUB SERPL-MCNC: 0.3 MG/DL (ref 0.2–1.1)
BUN SERPL-MCNC: 23 MG/DL (ref 6–23)
CALCIUM SERPL-MCNC: 8 MG/DL (ref 8.3–10.4)
CHLORIDE SERPL-SCNC: 104 MMOL/L (ref 101–110)
CO2 SERPL-SCNC: 27 MMOL/L (ref 21–32)
CREAT SERPL-MCNC: 0.4 MG/DL (ref 0.6–1)
DIFFERENTIAL METHOD BLD: ABNORMAL
EOSINOPHIL # BLD: 0 K/UL (ref 0–0.8)
EOSINOPHIL NFR BLD: 0 % (ref 0.5–7.8)
ERYTHROCYTE [DISTWIDTH] IN BLOOD BY AUTOMATED COUNT: 21.7 % (ref 11.9–14.6)
GLOBULIN SER CALC-MCNC: 3.9 G/DL (ref 2.8–4.5)
GLUCOSE SERPL-MCNC: 120 MG/DL (ref 65–100)
HCT VFR BLD AUTO: 30 % (ref 35.8–46.3)
HGB BLD-MCNC: 8.6 G/DL (ref 11.7–15.4)
IMM GRANULOCYTES # BLD AUTO: 0.1 K/UL (ref 0–0.5)
IMM GRANULOCYTES NFR BLD AUTO: 1 % (ref 0–5)
LYMPHOCYTES # BLD: 1.4 K/UL (ref 0.5–4.6)
LYMPHOCYTES NFR BLD: 12 % (ref 13–44)
MAGNESIUM SERPL-MCNC: 2.3 MG/DL (ref 1.8–2.4)
MCH RBC QN AUTO: 24.9 PG (ref 26.1–32.9)
MCHC RBC AUTO-ENTMCNC: 28.7 G/DL (ref 31.4–35)
MCV RBC AUTO: 87 FL (ref 82–102)
MONOCYTES # BLD: 1.1 K/UL (ref 0.1–1.3)
MONOCYTES NFR BLD: 9 % (ref 4–12)
NEUTS SEG # BLD: 9.7 K/UL (ref 1.7–8.2)
NEUTS SEG NFR BLD: 78 % (ref 43–78)
NRBC # BLD: 0.07 K/UL (ref 0–0.2)
PLATELET # BLD AUTO: 394 K/UL (ref 150–450)
PMV BLD AUTO: 10.6 FL (ref 9.4–12.3)
POTASSIUM SERPL-SCNC: 3.7 MMOL/L (ref 3.5–5.1)
PROT SERPL-MCNC: 5.7 G/DL (ref 6.3–8.2)
PROT UR-MCNC: 76 MG/DL
RBC # BLD AUTO: 3.45 M/UL (ref 4.05–5.2)
SODIUM SERPL-SCNC: 137 MMOL/L (ref 133–143)
WBC # BLD AUTO: 12.4 K/UL (ref 4.3–11.1)

## 2023-08-03 PROCEDURE — A4216 STERILE WATER/SALINE, 10 ML: HCPCS | Performed by: INTERNAL MEDICINE

## 2023-08-03 PROCEDURE — 36591 DRAW BLOOD OFF VENOUS DEVICE: CPT

## 2023-08-03 PROCEDURE — 83735 ASSAY OF MAGNESIUM: CPT

## 2023-08-03 PROCEDURE — 99215 OFFICE O/P EST HI 40 MIN: CPT | Performed by: INTERNAL MEDICINE

## 2023-08-03 PROCEDURE — 96375 TX/PRO/DX INJ NEW DRUG ADDON: CPT

## 2023-08-03 PROCEDURE — 80053 COMPREHEN METABOLIC PANEL: CPT

## 2023-08-03 PROCEDURE — 2500000003 HC RX 250 WO HCPCS: Performed by: INTERNAL MEDICINE

## 2023-08-03 PROCEDURE — 84156 ASSAY OF PROTEIN URINE: CPT

## 2023-08-03 PROCEDURE — 6360000002 HC RX W HCPCS: Performed by: INTERNAL MEDICINE

## 2023-08-03 PROCEDURE — 2580000003 HC RX 258: Performed by: INTERNAL MEDICINE

## 2023-08-03 PROCEDURE — 96413 CHEMO IV INFUSION 1 HR: CPT

## 2023-08-03 PROCEDURE — 85025 COMPLETE CBC W/AUTO DIFF WBC: CPT

## 2023-08-03 RX ORDER — DIPHENHYDRAMINE HYDROCHLORIDE 50 MG/ML
50 INJECTION INTRAMUSCULAR; INTRAVENOUS
Status: CANCELLED | OUTPATIENT
Start: 2023-08-03

## 2023-08-03 RX ORDER — MEPERIDINE HYDROCHLORIDE 50 MG/ML
12.5 INJECTION INTRAMUSCULAR; INTRAVENOUS; SUBCUTANEOUS PRN
OUTPATIENT
Start: 2023-08-03

## 2023-08-03 RX ORDER — MORPHINE SULFATE 2 MG/ML
2 INJECTION, SOLUTION INTRAMUSCULAR; INTRAVENOUS ONCE
Status: COMPLETED | OUTPATIENT
Start: 2023-08-03 | End: 2023-08-03

## 2023-08-03 RX ORDER — SODIUM CHLORIDE 9 MG/ML
5-250 INJECTION, SOLUTION INTRAVENOUS PRN
Status: DISCONTINUED | OUTPATIENT
Start: 2023-08-03 | End: 2023-08-04 | Stop reason: HOSPADM

## 2023-08-03 RX ORDER — ALBUTEROL SULFATE 90 UG/1
4 AEROSOL, METERED RESPIRATORY (INHALATION) PRN
OUTPATIENT
Start: 2023-08-03

## 2023-08-03 RX ORDER — ONDANSETRON 2 MG/ML
8 INJECTION INTRAMUSCULAR; INTRAVENOUS ONCE
Status: CANCELLED | OUTPATIENT
Start: 2023-08-03 | End: 2023-08-03

## 2023-08-03 RX ORDER — SODIUM CHLORIDE 9 MG/ML
5-250 INJECTION, SOLUTION INTRAVENOUS PRN
Status: CANCELLED | OUTPATIENT
Start: 2023-08-03

## 2023-08-03 RX ORDER — FAMOTIDINE 10 MG/ML
20 INJECTION, SOLUTION INTRAVENOUS ONCE
Status: CANCELLED | OUTPATIENT
Start: 2023-08-03 | End: 2023-08-03

## 2023-08-03 RX ORDER — DIPHENHYDRAMINE HYDROCHLORIDE 50 MG/ML
50 INJECTION INTRAMUSCULAR; INTRAVENOUS ONCE
Status: CANCELLED | OUTPATIENT
Start: 2023-08-03 | End: 2023-08-03

## 2023-08-03 RX ORDER — SODIUM CHLORIDE 0.9 % (FLUSH) 0.9 %
5-40 SYRINGE (ML) INJECTION PRN
Status: DISCONTINUED | OUTPATIENT
Start: 2023-08-03 | End: 2023-08-04 | Stop reason: HOSPADM

## 2023-08-03 RX ORDER — ONDANSETRON 2 MG/ML
8 INJECTION INTRAMUSCULAR; INTRAVENOUS ONCE
Status: COMPLETED | OUTPATIENT
Start: 2023-08-03 | End: 2023-08-03

## 2023-08-03 RX ORDER — ACETAMINOPHEN 325 MG/1
650 TABLET ORAL
OUTPATIENT
Start: 2023-08-03

## 2023-08-03 RX ORDER — SODIUM CHLORIDE 0.9 % (FLUSH) 0.9 %
5-40 SYRINGE (ML) INJECTION PRN
Status: CANCELLED | OUTPATIENT
Start: 2023-08-03

## 2023-08-03 RX ORDER — HEPARIN SODIUM (PORCINE) LOCK FLUSH IV SOLN 100 UNIT/ML 100 UNIT/ML
500 SOLUTION INTRAVENOUS PRN
OUTPATIENT
Start: 2023-08-03

## 2023-08-03 RX ORDER — MORPHINE SULFATE 2 MG/ML
2 INJECTION, SOLUTION INTRAMUSCULAR; INTRAVENOUS ONCE
Status: CANCELLED
Start: 2023-08-03 | End: 2023-08-03

## 2023-08-03 RX ORDER — SODIUM CHLORIDE 9 MG/ML
5-250 INJECTION, SOLUTION INTRAVENOUS PRN
OUTPATIENT
Start: 2023-08-03

## 2023-08-03 RX ORDER — SODIUM CHLORIDE 0.9 % (FLUSH) 0.9 %
5-40 SYRINGE (ML) INJECTION PRN
OUTPATIENT
Start: 2023-08-03

## 2023-08-03 RX ORDER — EPINEPHRINE 1 MG/ML
0.3 INJECTION, SOLUTION, CONCENTRATE INTRAVENOUS PRN
OUTPATIENT
Start: 2023-08-03

## 2023-08-03 RX ORDER — FAMOTIDINE 10 MG/ML
20 INJECTION, SOLUTION INTRAVENOUS
Status: CANCELLED | OUTPATIENT
Start: 2023-08-03

## 2023-08-03 RX ORDER — SODIUM CHLORIDE 9 MG/ML
INJECTION, SOLUTION INTRAVENOUS CONTINUOUS
OUTPATIENT
Start: 2023-08-03

## 2023-08-03 RX ORDER — DIPHENHYDRAMINE HYDROCHLORIDE 50 MG/ML
50 INJECTION INTRAMUSCULAR; INTRAVENOUS ONCE
Status: COMPLETED | OUTPATIENT
Start: 2023-08-03 | End: 2023-08-03

## 2023-08-03 RX ORDER — DIPHENHYDRAMINE HYDROCHLORIDE 50 MG/ML
50 INJECTION INTRAMUSCULAR; INTRAVENOUS
Status: DISCONTINUED | OUTPATIENT
Start: 2023-08-03 | End: 2023-08-04 | Stop reason: HOSPADM

## 2023-08-03 RX ORDER — ONDANSETRON 2 MG/ML
8 INJECTION INTRAMUSCULAR; INTRAVENOUS
OUTPATIENT
Start: 2023-08-03

## 2023-08-03 RX ORDER — DEXAMETHASONE SODIUM PHOSPHATE 10 MG/ML
10 INJECTION INTRAMUSCULAR; INTRAVENOUS ONCE
Status: COMPLETED | OUTPATIENT
Start: 2023-08-03 | End: 2023-08-03

## 2023-08-03 RX ADMIN — PACLITAXEL 108 MG: 6 INJECTION, SOLUTION, CONCENTRATE INTRAVENOUS at 13:03

## 2023-08-03 RX ADMIN — ONDANSETRON 8 MG: 2 INJECTION INTRAMUSCULAR; INTRAVENOUS at 12:05

## 2023-08-03 RX ADMIN — SODIUM CHLORIDE, PRESERVATIVE FREE 10 ML: 5 INJECTION INTRAVENOUS at 10:34

## 2023-08-03 RX ADMIN — DEXAMETHASONE SODIUM PHOSPHATE 10 MG: 10 INJECTION INTRAMUSCULAR; INTRAVENOUS at 12:11

## 2023-08-03 RX ADMIN — SODIUM CHLORIDE, PRESERVATIVE FREE 10 ML: 5 INJECTION INTRAVENOUS at 14:50

## 2023-08-03 RX ADMIN — MORPHINE SULFATE 2 MG: 2 INJECTION, SOLUTION INTRAMUSCULAR; INTRAVENOUS at 12:16

## 2023-08-03 RX ADMIN — FAMOTIDINE 20 MG: 10 INJECTION INTRAVENOUS at 12:09

## 2023-08-03 RX ADMIN — SODIUM CHLORIDE, PRESERVATIVE FREE 10 ML: 5 INJECTION INTRAVENOUS at 12:00

## 2023-08-03 RX ADMIN — SODIUM CHLORIDE 25 ML/HR: 9 INJECTION, SOLUTION INTRAVENOUS at 12:00

## 2023-08-03 RX ADMIN — DIPHENHYDRAMINE HYDROCHLORIDE 50 MG: 50 INJECTION, SOLUTION INTRAMUSCULAR; INTRAVENOUS at 12:20

## 2023-08-03 ASSESSMENT — PATIENT HEALTH QUESTIONNAIRE - PHQ9
SUM OF ALL RESPONSES TO PHQ QUESTIONS 1-9: 2
SUM OF ALL RESPONSES TO PHQ QUESTIONS 1-9: 2
2. FEELING DOWN, DEPRESSED OR HOPELESS: 1
1. LITTLE INTEREST OR PLEASURE IN DOING THINGS: 1
SUM OF ALL RESPONSES TO PHQ9 QUESTIONS 1 & 2: 2
SUM OF ALL RESPONSES TO PHQ QUESTIONS 1-9: 2
SUM OF ALL RESPONSES TO PHQ QUESTIONS 1-9: 2

## 2023-08-03 ASSESSMENT — PAIN SCALES - GENERAL: PAINLEVEL_OUTOF10: 8

## 2023-08-03 ASSESSMENT — PAIN DESCRIPTION - DESCRIPTORS: DESCRIPTORS: ACHING

## 2023-08-03 ASSESSMENT — PAIN DESCRIPTION - LOCATION: LOCATION: ABDOMEN

## 2023-08-03 ASSESSMENT — PAIN DESCRIPTION - ORIENTATION: ORIENTATION: LEFT;RIGHT

## 2023-08-03 ASSESSMENT — PAIN - FUNCTIONAL ASSESSMENT: PAIN_FUNCTIONAL_ASSESSMENT: PREVENTS OR INTERFERES WITH MANY ACTIVE NOT PASSIVE ACTIVITIES

## 2023-08-03 NOTE — PROGRESS NOTES
Port accessed and labs drawn per protocol. Port remains accessed for infusion appt today. Pt discharged via [place].

## 2023-08-03 NOTE — PROGRESS NOTES
Arrived to the [address] [place] [address]. Taxol completed. Patient tolerated well. Any issues or concerns during appointment: none. Patient aware of next infusion appointment on 8/10 (date) at 9:00 AM (time). Patient instructed to call provider with temperature of 100.4 or greater or nausea/vomiting/ diarrhea or pain not controlled by medications  Discharged via w/c.

## 2023-08-03 NOTE — PATIENT INSTRUCTIONS
express an interest in My Chart. My Chart log in information explained on the after visit summary printout at the [address] [facility] desk.     Mindi Feng RN

## 2023-08-06 ENCOUNTER — HOSPITAL ENCOUNTER (INPATIENT)
Age: 48
LOS: 3 days | Discharge: HOME HEALTH | DRG: 871 | End: 2023-08-09
Attending: EMERGENCY MEDICINE | Admitting: INTERNAL MEDICINE
Payer: COMMERCIAL

## 2023-08-06 ENCOUNTER — APPOINTMENT (OUTPATIENT)
Dept: GENERAL RADIOLOGY | Age: 48
DRG: 871 | End: 2023-08-06
Payer: COMMERCIAL

## 2023-08-06 DIAGNOSIS — R11.2 INTRACTABLE NAUSEA AND VOMITING: ICD-10-CM

## 2023-08-06 DIAGNOSIS — G89.3 CANCER RELATED PAIN: ICD-10-CM

## 2023-08-06 DIAGNOSIS — D72.829 LEUKOCYTOSIS, UNSPECIFIED TYPE: ICD-10-CM

## 2023-08-06 DIAGNOSIS — R18.0 MALIGNANT ASCITES: ICD-10-CM

## 2023-08-06 DIAGNOSIS — R10.84 GENERALIZED ABDOMINAL PAIN: Primary | ICD-10-CM

## 2023-08-06 PROBLEM — A41.9 SEPSIS (HCC): Status: ACTIVE | Noted: 2023-08-06

## 2023-08-06 LAB
ALBUMIN SERPL-MCNC: 1.9 G/DL (ref 3.5–5)
ALBUMIN/GLOB SERPL: 0.5 (ref 0.4–1.6)
ALP SERPL-CCNC: 192 U/L (ref 50–136)
ALT SERPL-CCNC: 44 U/L (ref 12–65)
ANION GAP SERPL CALC-SCNC: 6 MMOL/L (ref 2–11)
AST SERPL-CCNC: 32 U/L (ref 15–37)
BASOPHILS # BLD: 0 K/UL (ref 0–0.2)
BASOPHILS NFR BLD: 0 % (ref 0–2)
BILIRUB SERPL-MCNC: 0.3 MG/DL (ref 0.2–1.1)
BUN SERPL-MCNC: 22 MG/DL (ref 6–23)
CALCIUM SERPL-MCNC: 8.5 MG/DL (ref 8.3–10.4)
CHLORIDE SERPL-SCNC: 103 MMOL/L (ref 101–110)
CO2 SERPL-SCNC: 29 MMOL/L (ref 21–32)
CREAT SERPL-MCNC: 0.4 MG/DL (ref 0.6–1)
DIFFERENTIAL METHOD BLD: ABNORMAL
EKG ATRIAL RATE: 129 BPM
EKG DIAGNOSIS: NORMAL
EKG P AXIS: 31 DEGREES
EKG P-R INTERVAL: 130 MS
EKG Q-T INTERVAL: 294 MS
EKG QRS DURATION: 62 MS
EKG QTC CALCULATION (BAZETT): 430 MS
EKG R AXIS: 21 DEGREES
EKG T AXIS: 10 DEGREES
EKG VENTRICULAR RATE: 129 BPM
EOSINOPHIL # BLD: 0 K/UL (ref 0–0.8)
EOSINOPHIL NFR BLD: 0 % (ref 0.5–7.8)
ERYTHROCYTE [DISTWIDTH] IN BLOOD BY AUTOMATED COUNT: 21.5 % (ref 11.9–14.6)
GLOBULIN SER CALC-MCNC: 4 G/DL (ref 2.8–4.5)
GLUCOSE SERPL-MCNC: 103 MG/DL (ref 65–100)
HCT VFR BLD AUTO: 28.9 % (ref 35.8–46.3)
HGB BLD-MCNC: 8.2 G/DL (ref 11.7–15.4)
IMM GRANULOCYTES # BLD AUTO: 0.1 K/UL (ref 0–0.5)
IMM GRANULOCYTES NFR BLD AUTO: 1 % (ref 0–5)
LACTATE SERPL-SCNC: 1.5 MMOL/L (ref 0.4–2)
LACTATE SERPL-SCNC: 1.8 MMOL/L (ref 0.4–2)
LYMPHOCYTES # BLD: 1.2 K/UL (ref 0.5–4.6)
LYMPHOCYTES NFR BLD: 8 % (ref 13–44)
MCH RBC QN AUTO: 25.3 PG (ref 26.1–32.9)
MCHC RBC AUTO-ENTMCNC: 28.4 G/DL (ref 31.4–35)
MCV RBC AUTO: 89.2 FL (ref 82–102)
MONOCYTES # BLD: 0.2 K/UL (ref 0.1–1.3)
MONOCYTES NFR BLD: 2 % (ref 4–12)
NEUTS SEG # BLD: 13.9 K/UL (ref 1.7–8.2)
NEUTS SEG NFR BLD: 90 % (ref 43–78)
NRBC # BLD: 0.02 K/UL (ref 0–0.2)
PLATELET # BLD AUTO: 410 K/UL (ref 150–450)
PMV BLD AUTO: 11.1 FL (ref 9.4–12.3)
POTASSIUM SERPL-SCNC: 3.8 MMOL/L (ref 3.5–5.1)
PROCALCITONIN SERPL-MCNC: 0.07 NG/ML (ref 0–0.49)
PROT SERPL-MCNC: 5.9 G/DL (ref 6.3–8.2)
RBC # BLD AUTO: 3.24 M/UL (ref 4.05–5.2)
SODIUM SERPL-SCNC: 138 MMOL/L (ref 133–143)
WBC # BLD AUTO: 15.4 K/UL (ref 4.3–11.1)

## 2023-08-06 PROCEDURE — 93005 ELECTROCARDIOGRAM TRACING: CPT | Performed by: EMERGENCY MEDICINE

## 2023-08-06 PROCEDURE — 2580000003 HC RX 258: Performed by: EMERGENCY MEDICINE

## 2023-08-06 PROCEDURE — C9113 INJ PANTOPRAZOLE SODIUM, VIA: HCPCS | Performed by: EMERGENCY MEDICINE

## 2023-08-06 PROCEDURE — 2580000003 HC RX 258: Performed by: INTERNAL MEDICINE

## 2023-08-06 PROCEDURE — 87040 BLOOD CULTURE FOR BACTERIA: CPT

## 2023-08-06 PROCEDURE — 6360000002 HC RX W HCPCS: Performed by: INTERNAL MEDICINE

## 2023-08-06 PROCEDURE — 93010 ELECTROCARDIOGRAM REPORT: CPT | Performed by: INTERNAL MEDICINE

## 2023-08-06 PROCEDURE — 6360000002 HC RX W HCPCS: Performed by: EMERGENCY MEDICINE

## 2023-08-06 PROCEDURE — 87086 URINE CULTURE/COLONY COUNT: CPT

## 2023-08-06 PROCEDURE — 2500000003 HC RX 250 WO HCPCS: Performed by: EMERGENCY MEDICINE

## 2023-08-06 PROCEDURE — 83605 ASSAY OF LACTIC ACID: CPT

## 2023-08-06 PROCEDURE — 96375 TX/PRO/DX INJ NEW DRUG ADDON: CPT

## 2023-08-06 PROCEDURE — A4216 STERILE WATER/SALINE, 10 ML: HCPCS | Performed by: EMERGENCY MEDICINE

## 2023-08-06 PROCEDURE — 3E0436Z INTRODUCTION OF NUTRITIONAL SUBSTANCE INTO CENTRAL VEIN, PERCUTANEOUS APPROACH: ICD-10-PCS | Performed by: INTERNAL MEDICINE

## 2023-08-06 PROCEDURE — 84145 PROCALCITONIN (PCT): CPT

## 2023-08-06 PROCEDURE — 96365 THER/PROPH/DIAG IV INF INIT: CPT

## 2023-08-06 PROCEDURE — 71045 X-RAY EXAM CHEST 1 VIEW: CPT

## 2023-08-06 PROCEDURE — 1100000000 HC RM PRIVATE

## 2023-08-06 PROCEDURE — 36415 COLL VENOUS BLD VENIPUNCTURE: CPT

## 2023-08-06 PROCEDURE — 85025 COMPLETE CBC W/AUTO DIFF WBC: CPT

## 2023-08-06 PROCEDURE — 99285 EMERGENCY DEPT VISIT HI MDM: CPT

## 2023-08-06 PROCEDURE — 80053 COMPREHEN METABOLIC PANEL: CPT

## 2023-08-06 PROCEDURE — 2500000003 HC RX 250 WO HCPCS: Performed by: INTERNAL MEDICINE

## 2023-08-06 PROCEDURE — 81001 URINALYSIS AUTO W/SCOPE: CPT

## 2023-08-06 RX ORDER — HYDROMORPHONE HYDROCHLORIDE 1 MG/ML
0.5 INJECTION, SOLUTION INTRAMUSCULAR; INTRAVENOUS; SUBCUTANEOUS EVERY 4 HOURS PRN
Status: DISCONTINUED | OUTPATIENT
Start: 2023-08-06 | End: 2023-08-08

## 2023-08-06 RX ORDER — SODIUM CHLORIDE, SODIUM LACTATE, POTASSIUM CHLORIDE, AND CALCIUM CHLORIDE .6; .31; .03; .02 G/100ML; G/100ML; G/100ML; G/100ML
30 INJECTION, SOLUTION INTRAVENOUS ONCE
Status: COMPLETED | OUTPATIENT
Start: 2023-08-06 | End: 2023-08-06

## 2023-08-06 RX ORDER — ACETAMINOPHEN 325 MG/1
650 TABLET ORAL EVERY 6 HOURS PRN
Status: DISCONTINUED | OUTPATIENT
Start: 2023-08-06 | End: 2023-08-09 | Stop reason: HOSPADM

## 2023-08-06 RX ORDER — ACETAMINOPHEN 650 MG/1
650 SUPPOSITORY RECTAL EVERY 6 HOURS PRN
Status: DISCONTINUED | OUTPATIENT
Start: 2023-08-06 | End: 2023-08-09 | Stop reason: HOSPADM

## 2023-08-06 RX ORDER — ONDANSETRON 2 MG/ML
4 INJECTION INTRAMUSCULAR; INTRAVENOUS EVERY 6 HOURS PRN
Status: DISCONTINUED | OUTPATIENT
Start: 2023-08-06 | End: 2023-08-09 | Stop reason: HOSPADM

## 2023-08-06 RX ORDER — SODIUM CHLORIDE 9 MG/ML
INJECTION, SOLUTION INTRAVENOUS CONTINUOUS
Status: DISCONTINUED | OUTPATIENT
Start: 2023-08-06 | End: 2023-08-07

## 2023-08-06 RX ORDER — PANTOPRAZOLE SODIUM 40 MG/1
40 TABLET, DELAYED RELEASE ORAL 2 TIMES DAILY
Status: DISCONTINUED | OUTPATIENT
Start: 2023-08-06 | End: 2023-08-09 | Stop reason: HOSPADM

## 2023-08-06 RX ORDER — SODIUM CHLORIDE 0.9 % (FLUSH) 0.9 %
5-40 SYRINGE (ML) INJECTION EVERY 12 HOURS SCHEDULED
Status: DISCONTINUED | OUTPATIENT
Start: 2023-08-06 | End: 2023-08-09 | Stop reason: HOSPADM

## 2023-08-06 RX ORDER — ONDANSETRON 2 MG/ML
8 INJECTION INTRAMUSCULAR; INTRAVENOUS
Status: COMPLETED | OUTPATIENT
Start: 2023-08-06 | End: 2023-08-06

## 2023-08-06 RX ORDER — ENOXAPARIN SODIUM 100 MG/ML
40 INJECTION SUBCUTANEOUS DAILY
Status: DISCONTINUED | OUTPATIENT
Start: 2023-08-06 | End: 2023-08-09 | Stop reason: HOSPADM

## 2023-08-06 RX ORDER — ONDANSETRON 4 MG/1
4 TABLET, ORALLY DISINTEGRATING ORAL EVERY 8 HOURS PRN
Status: DISCONTINUED | OUTPATIENT
Start: 2023-08-06 | End: 2023-08-09 | Stop reason: HOSPADM

## 2023-08-06 RX ORDER — SODIUM CHLORIDE 9 MG/ML
INJECTION, SOLUTION INTRAVENOUS PRN
Status: DISCONTINUED | OUTPATIENT
Start: 2023-08-06 | End: 2023-08-09 | Stop reason: HOSPADM

## 2023-08-06 RX ORDER — HYDROMORPHONE HYDROCHLORIDE 1 MG/ML
1 INJECTION, SOLUTION INTRAMUSCULAR; INTRAVENOUS; SUBCUTANEOUS
Status: COMPLETED | OUTPATIENT
Start: 2023-08-06 | End: 2023-08-06

## 2023-08-06 RX ORDER — SODIUM CHLORIDE 0.9 % (FLUSH) 0.9 %
5-40 SYRINGE (ML) INJECTION PRN
Status: DISCONTINUED | OUTPATIENT
Start: 2023-08-06 | End: 2023-08-09 | Stop reason: HOSPADM

## 2023-08-06 RX ORDER — POLYETHYLENE GLYCOL 3350 17 G/17G
17 POWDER, FOR SOLUTION ORAL DAILY PRN
Status: DISCONTINUED | OUTPATIENT
Start: 2023-08-06 | End: 2023-08-09 | Stop reason: HOSPADM

## 2023-08-06 RX ADMIN — SODIUM CHLORIDE, PRESERVATIVE FREE 10 ML: 5 INJECTION INTRAVENOUS at 21:33

## 2023-08-06 RX ADMIN — PIPERACILLIN AND TAZOBACTAM 4500 MG: 4; .5 INJECTION, POWDER, FOR SOLUTION INTRAVENOUS at 12:19

## 2023-08-06 RX ADMIN — ONDANSETRON 4 MG: 2 INJECTION INTRAMUSCULAR; INTRAVENOUS at 21:31

## 2023-08-06 RX ADMIN — ONDANSETRON 4 MG: 2 INJECTION INTRAMUSCULAR; INTRAVENOUS at 16:29

## 2023-08-06 RX ADMIN — PIPERACILLIN SODIUM AND TAZOBACTAM SODIUM 3375 MG: 3; .375 INJECTION, POWDER, LYOPHILIZED, FOR SOLUTION INTRAVENOUS at 21:48

## 2023-08-06 RX ADMIN — HYDROMORPHONE HYDROCHLORIDE 0.5 MG: 1 INJECTION, SOLUTION INTRAMUSCULAR; INTRAVENOUS; SUBCUTANEOUS at 16:30

## 2023-08-06 RX ADMIN — SODIUM CHLORIDE: 9 INJECTION, SOLUTION INTRAVENOUS at 16:13

## 2023-08-06 RX ADMIN — PIPERACILLIN AND TAZOBACTAM 4500 MG: 4; .5 INJECTION, POWDER, FOR SOLUTION INTRAVENOUS at 15:08

## 2023-08-06 RX ADMIN — ONDANSETRON 8 MG: 2 INJECTION INTRAMUSCULAR; INTRAVENOUS at 12:01

## 2023-08-06 RX ADMIN — SODIUM CHLORIDE 40 MG: 9 INJECTION INTRAMUSCULAR; INTRAVENOUS; SUBCUTANEOUS at 12:01

## 2023-08-06 RX ADMIN — SODIUM CHLORIDE, POTASSIUM CHLORIDE, SODIUM LACTATE AND CALCIUM CHLORIDE 1641 ML: 600; 310; 30; 20 INJECTION, SOLUTION INTRAVENOUS at 12:01

## 2023-08-06 RX ADMIN — SODIUM CHLORIDE: 9 INJECTION, SOLUTION INTRAVENOUS at 15:07

## 2023-08-06 RX ADMIN — HYDROMORPHONE HYDROCHLORIDE 0.5 MG: 1 INJECTION, SOLUTION INTRAMUSCULAR; INTRAVENOUS; SUBCUTANEOUS at 21:39

## 2023-08-06 RX ADMIN — HYDROMORPHONE HYDROCHLORIDE 1 MG: 1 INJECTION, SOLUTION INTRAMUSCULAR; INTRAVENOUS; SUBCUTANEOUS at 12:42

## 2023-08-06 ASSESSMENT — PAIN SCALES - GENERAL
PAINLEVEL_OUTOF10: 0
PAINLEVEL_OUTOF10: 7
PAINLEVEL_OUTOF10: 8
PAINLEVEL_OUTOF10: 3
PAINLEVEL_OUTOF10: 8
PAINLEVEL_OUTOF10: 8
PAINLEVEL_OUTOF10: 3
PAINLEVEL_OUTOF10: 0

## 2023-08-06 ASSESSMENT — ENCOUNTER SYMPTOMS
WHEEZING: 0
SHORTNESS OF BREATH: 0
EYE REDNESS: 0
BACK PAIN: 0
VOMITING: 1
COUGH: 1
SINUS PAIN: 0
ABDOMINAL PAIN: 1
EYE ITCHING: 0
TROUBLE SWALLOWING: 0
CONSTIPATION: 0
NAUSEA: 1
EYE PAIN: 0
DIARRHEA: 0

## 2023-08-06 ASSESSMENT — PAIN DESCRIPTION - ONSET
ONSET: ON-GOING
ONSET: GRADUAL
ONSET: ON-GOING

## 2023-08-06 ASSESSMENT — PAIN DESCRIPTION - DESCRIPTORS
DESCRIPTORS: SORE;ACHING
DESCRIPTORS: PRESSURE
DESCRIPTORS: PRESSURE

## 2023-08-06 ASSESSMENT — PAIN DESCRIPTION - LOCATION
LOCATION: ABDOMEN

## 2023-08-06 ASSESSMENT — PAIN DESCRIPTION - FREQUENCY
FREQUENCY: CONTINUOUS
FREQUENCY: INTERMITTENT
FREQUENCY: CONTINUOUS

## 2023-08-06 ASSESSMENT — PAIN - FUNCTIONAL ASSESSMENT
PAIN_FUNCTIONAL_ASSESSMENT: PREVENTS OR INTERFERES SOME ACTIVE ACTIVITIES AND ADLS
PAIN_FUNCTIONAL_ASSESSMENT: PREVENTS OR INTERFERES SOME ACTIVE ACTIVITIES AND ADLS
PAIN_FUNCTIONAL_ASSESSMENT: ACTIVITIES ARE NOT PREVENTED
PAIN_FUNCTIONAL_ASSESSMENT: 0-10

## 2023-08-06 ASSESSMENT — PAIN DESCRIPTION - PAIN TYPE
TYPE: ACUTE PAIN

## 2023-08-06 ASSESSMENT — PAIN DESCRIPTION - ORIENTATION
ORIENTATION: RIGHT;LEFT
ORIENTATION: LEFT;LOWER
ORIENTATION: LEFT;LOWER

## 2023-08-06 NOTE — ED PROVIDER NOTES
Emergency Department Provider Note       PCP: None None   Age: 50 y.o. Sex: female     DISPOSITION Decision To Admit 08/06/2023 01:21:05 PM       ICD-10-CM    1. Generalized abdominal pain  R10.84       2. Leukocytosis, unspecified type  D72.829       3. Intractable nausea and vomiting  R11.2       4. Malignant ascites  R18.0           Medical Decision Making     Complexity of Problems Addressed:  1 or more chronic illnesses that pose a threat to life or bodily function. Data Reviewed and Analyzed:  Category 1:   I independently ordered and reviewed each unique test.  I reviewed external records: provider visit note from PCP. I reviewed external records: provider visit note from outside specialist.   The patients assessment required an independent historian: Significant other. The reason they were needed is important historical information not provided by the patient. Category 2:   I independently ordered and interpreted the ED EKG in the absence of a Cardiologist.      I interpreted the X-rays chest x-ray revealed right pleural effusion, slightly worse than prior. Category 3: Discussion of management or test interpretation. 80-year-old female patient with a history of metastatic peritoneal carcinomatosis presents to the ER with worsening nausea vomiting tachycardia and abdominal pain  Last chemo Thursday  Work-up today revealed leukocytosis and some likely dehydration  I reviewed findings with oncology, Dr. Padmini Rodriges patient needs to be admitted  [facility] hospitalist service  The patient was admitted and I have discussed patient management with the admitting provider. The management of this patient was discussed with an external consultant. Risk of Complications and/or Morbidity of Patient Management:  Parental controlled substances given in the ED. Discussion with external consultants. Chronic medical problems impacting care include peritoneal carcinomatosis.   Shared medical decision

## 2023-08-06 NOTE — ED NOTES
TRANSFER - OUT REPORT:    Verbal report given to [place], RN on [employer]  being transferred to [phone] for routine progression of patient care       Report consisted of patient's Situation, Background, Assessment and   Recommendations(SBAR). Information from the following report(s) ED SBAR was reviewed with the receiving nurse. [place] [facility] Assessment:    Presents to emergency department  because of falls (Syncope, seizure, or loss of consciousness): No  Age > 70: No  Altered Mental Status, Intoxication with alcohol or substance confusion (Disorientation, impaired judgment, poor safety awaremess, or inability to follow instructions): No  Impaired Mobility: Ambulates or transfers with assistive devices or assistance; Unable to ambulate or transer.: Yes (requires assistance with ambulation due to weakness)             Lines:   Single Lumen Implantable Port Right Subclavian (Active)        Opportunity for questions and clarification was provided.       Patient transported with:  Moshe Karimi RN  08/06/23 0003

## 2023-08-06 NOTE — ACP (ADVANCE CARE PLANNING)
[facility]  At the heart of better care     Advance Care Planning   Admit Date:  2023 41:17 AM   Name:  Roque Thomas   Age:  50 y.o. Sex:  female  :  1975   MRN:  [id number]   Room:  [place] [address]    Roque Thomas has capacity to make her own decisions:   Yes    If pt unable to make decisions, POA/surrogate decision maker:  Boyfriend  . Shannan    Other people present:   Sister and brother-in-law    Patient / surrogate decision-maker directed code status:  Full Code    Other ACP topics discussed, if applicable:   Treatment of sepsis, vomiting, abdominal pain     Patient or surrogate consented to discussion of the current conditions, workup, management plans, prognosis, and the risk for further deterioration. Time spent: 19 minutes in direct discussion.       Signed:  Jm Aguilar MD

## 2023-08-06 NOTE — PLAN OF CARE
Problem: Safety - Adult  Goal: Free from fall injury  Outcome: Progressing     Problem: Chronic Conditions and Co-morbidities  Goal: Patient's chronic conditions and co-morbidity symptoms are monitored and maintained or improved  Outcome: Progressing     Problem: Pain  Goal: Verbalizes/displays adequate comfort level or baseline comfort level  Outcome: Progressing     Problem: Skin/Tissue Integrity  Goal: Absence of new skin breakdown  Description: 1. Monitor for areas of redness and/or skin breakdown  2. Assess vascular access sites hourly  3. Every 4-6 hours minimum:  Change oxygen saturation probe site  4. Every 4-6 hours:  If on nasal continuous positive airway pressure, respiratory therapy assess nares and determine need for appliance change or resting period. Outcome: Progressing     Problem: Anxiety  Goal: Will report anxiety at manageable levels  Description: INTERVENTIONS:  1. Administer medication as ordered  2. Teach and rehearse alternative coping skills  3. Provide emotional support with 1:1 interaction with staff  Outcome: Progressing     Problem: Coping  Goal: Pt/Family able to verbalize concerns and demonstrate effective coping strategies  Description: INTERVENTIONS:  1. Assist patient/family to identify coping skills, available support systems and cultural and spiritual values  2. Provide emotional support, including active listening and acknowledgement of concerns of patient and caregivers  3. Reduce environmental stimuli, as able  4. Instruct patient/family in relaxation techniques, as appropriate  5.  Assess for spiritual pain/suffering and initiate Spiritual Care, Psychosocial Clinical Specialist consults as needed  Outcome: Progressing

## 2023-08-07 PROBLEM — J90 PLEURAL EFFUSION: Status: ACTIVE | Noted: 2023-08-07

## 2023-08-07 LAB
ALBUMIN SERPL-MCNC: 1.7 G/DL (ref 3.5–5)
ALBUMIN/GLOB SERPL: 0.5 (ref 0.4–1.6)
ALP SERPL-CCNC: 180 U/L (ref 50–136)
ALT SERPL-CCNC: 45 U/L (ref 12–65)
ANION GAP SERPL CALC-SCNC: 5 MMOL/L (ref 2–11)
APPEARANCE UR: CLEAR
AST SERPL-CCNC: 44 U/L (ref 15–37)
BACTERIA URNS QL MICRO: ABNORMAL /HPF
BASOPHILS # BLD: 0 K/UL (ref 0–0.2)
BASOPHILS NFR BLD: 0 % (ref 0–2)
BILIRUB SERPL-MCNC: 0.5 MG/DL (ref 0.2–1.1)
BILIRUB UR QL: NEGATIVE
BUN SERPL-MCNC: 15 MG/DL (ref 6–23)
CALCIUM SERPL-MCNC: 8 MG/DL (ref 8.3–10.4)
CHLORIDE SERPL-SCNC: 107 MMOL/L (ref 101–110)
CO2 SERPL-SCNC: 29 MMOL/L (ref 21–32)
COLOR UR: ABNORMAL
CREAT SERPL-MCNC: 0.5 MG/DL (ref 0.6–1)
DIFFERENTIAL METHOD BLD: ABNORMAL
EOSINOPHIL # BLD: 0 K/UL (ref 0–0.8)
EOSINOPHIL NFR BLD: 0 % (ref 0.5–7.8)
EPI CELLS #/AREA URNS HPF: ABNORMAL /HPF
ERYTHROCYTE [DISTWIDTH] IN BLOOD BY AUTOMATED COUNT: 21.4 % (ref 11.9–14.6)
GLOBULIN SER CALC-MCNC: 3.5 G/DL (ref 2.8–4.5)
GLUCOSE SERPL-MCNC: 97 MG/DL (ref 65–100)
GLUCOSE UR STRIP.AUTO-MCNC: NEGATIVE MG/DL
HCT VFR BLD AUTO: 23.8 % (ref 35.8–46.3)
HCT VFR BLD AUTO: 28.9 % (ref 35.8–46.3)
HGB BLD-MCNC: 6.8 G/DL (ref 11.7–15.4)
HGB BLD-MCNC: 8.6 G/DL (ref 11.7–15.4)
HGB UR QL STRIP: NEGATIVE
HISTORY CHECK: NORMAL
IMM GRANULOCYTES # BLD AUTO: 0.1 K/UL (ref 0–0.5)
IMM GRANULOCYTES NFR BLD AUTO: 1 % (ref 0–5)
KETONES UR QL STRIP.AUTO: NEGATIVE MG/DL
LEUKOCYTE ESTERASE UR QL STRIP.AUTO: ABNORMAL
LYMPHOCYTES # BLD: 1.2 K/UL (ref 0.5–4.6)
LYMPHOCYTES NFR BLD: 12 % (ref 13–44)
MAGNESIUM SERPL-MCNC: 2 MG/DL (ref 1.8–2.4)
MCH RBC QN AUTO: 25.6 PG (ref 26.1–32.9)
MCHC RBC AUTO-ENTMCNC: 28.6 G/DL (ref 31.4–35)
MCV RBC AUTO: 89.5 FL (ref 82–102)
MONOCYTES # BLD: 0.2 K/UL (ref 0.1–1.3)
MONOCYTES NFR BLD: 2 % (ref 4–12)
NEUTS SEG # BLD: 8.7 K/UL (ref 1.7–8.2)
NEUTS SEG NFR BLD: 85 % (ref 43–78)
NITRITE UR QL STRIP.AUTO: NEGATIVE
NRBC # BLD: 0.02 K/UL (ref 0–0.2)
NT PRO BNP: 17 PG/ML (ref 5–125)
OTHER OBSERVATIONS: ABNORMAL
PH UR STRIP: 5.5 (ref 5–9)
PHOSPHATE SERPL-MCNC: 3.5 MG/DL (ref 2.5–4.5)
PLATELET # BLD AUTO: 329 K/UL (ref 150–450)
PMV BLD AUTO: 11.2 FL (ref 9.4–12.3)
POTASSIUM SERPL-SCNC: 3.9 MMOL/L (ref 3.5–5.1)
PROT SERPL-MCNC: 5.2 G/DL (ref 6.3–8.2)
PROT UR STRIP-MCNC: 30 MG/DL
RBC # BLD AUTO: 2.66 M/UL (ref 4.05–5.2)
SODIUM SERPL-SCNC: 141 MMOL/L (ref 133–143)
SP GR UR REFRACTOMETRY: 1.03 (ref 1–1.02)
UROBILINOGEN UR QL STRIP.AUTO: 1 EU/DL (ref 0.2–1)
WBC # BLD AUTO: 10.2 K/UL (ref 4.3–11.1)
WBC URNS QL MICRO: ABNORMAL /HPF
YEAST URNS QL MICRO: ABNORMAL

## 2023-08-07 PROCEDURE — 6360000002 HC RX W HCPCS: Performed by: NURSE PRACTITIONER

## 2023-08-07 PROCEDURE — 97161 PT EVAL LOW COMPLEX 20 MIN: CPT

## 2023-08-07 PROCEDURE — 83735 ASSAY OF MAGNESIUM: CPT

## 2023-08-07 PROCEDURE — 86923 COMPATIBILITY TEST ELECTRIC: CPT

## 2023-08-07 PROCEDURE — 85014 HEMATOCRIT: CPT

## 2023-08-07 PROCEDURE — 6370000000 HC RX 637 (ALT 250 FOR IP): Performed by: INTERNAL MEDICINE

## 2023-08-07 PROCEDURE — 85018 HEMOGLOBIN: CPT

## 2023-08-07 PROCEDURE — 83880 ASSAY OF NATRIURETIC PEPTIDE: CPT

## 2023-08-07 PROCEDURE — 99222 1ST HOSP IP/OBS MODERATE 55: CPT | Performed by: INTERNAL MEDICINE

## 2023-08-07 PROCEDURE — 80053 COMPREHEN METABOLIC PANEL: CPT

## 2023-08-07 PROCEDURE — 97535 SELF CARE MNGMENT TRAINING: CPT

## 2023-08-07 PROCEDURE — 97530 THERAPEUTIC ACTIVITIES: CPT

## 2023-08-07 PROCEDURE — 85025 COMPLETE CBC W/AUTO DIFF WBC: CPT

## 2023-08-07 PROCEDURE — 1100000000 HC RM PRIVATE

## 2023-08-07 PROCEDURE — 2580000003 HC RX 258: Performed by: INTERNAL MEDICINE

## 2023-08-07 PROCEDURE — APPSS60 APP SPLIT SHARED TIME 46-60 MINUTES: Performed by: NURSE PRACTITIONER

## 2023-08-07 PROCEDURE — 86644 CMV ANTIBODY: CPT

## 2023-08-07 PROCEDURE — 86850 RBC ANTIBODY SCREEN: CPT

## 2023-08-07 PROCEDURE — 97165 OT EVAL LOW COMPLEX 30 MIN: CPT

## 2023-08-07 PROCEDURE — 86901 BLOOD TYPING SEROLOGIC RH(D): CPT

## 2023-08-07 PROCEDURE — 86900 BLOOD TYPING SEROLOGIC ABO: CPT

## 2023-08-07 PROCEDURE — 36430 TRANSFUSION BLD/BLD COMPNT: CPT

## 2023-08-07 PROCEDURE — 84100 ASSAY OF PHOSPHORUS: CPT

## 2023-08-07 PROCEDURE — 6360000002 HC RX W HCPCS: Performed by: INTERNAL MEDICINE

## 2023-08-07 PROCEDURE — 2500000003 HC RX 250 WO HCPCS: Performed by: INTERNAL MEDICINE

## 2023-08-07 PROCEDURE — 99223 1ST HOSP IP/OBS HIGH 75: CPT | Performed by: INTERNAL MEDICINE

## 2023-08-07 PROCEDURE — P9040 RBC LEUKOREDUCED IRRADIATED: HCPCS

## 2023-08-07 RX ORDER — POTASSIUM CHLORIDE 29.8 MG/ML
20 INJECTION INTRAVENOUS PRN
Status: DISCONTINUED | OUTPATIENT
Start: 2023-08-07 | End: 2023-08-09 | Stop reason: HOSPADM

## 2023-08-07 RX ORDER — FUROSEMIDE 10 MG/ML
20 INJECTION INTRAMUSCULAR; INTRAVENOUS DAILY
Status: DISCONTINUED | OUTPATIENT
Start: 2023-08-07 | End: 2023-08-09 | Stop reason: HOSPADM

## 2023-08-07 RX ORDER — SODIUM CHLORIDE 9 MG/ML
INJECTION, SOLUTION INTRAVENOUS PRN
Status: DISCONTINUED | OUTPATIENT
Start: 2023-08-07 | End: 2023-08-09 | Stop reason: HOSPADM

## 2023-08-07 RX ORDER — MAGNESIUM SULFATE IN WATER 40 MG/ML
2000 INJECTION, SOLUTION INTRAVENOUS PRN
Status: DISCONTINUED | OUTPATIENT
Start: 2023-08-07 | End: 2023-08-09 | Stop reason: HOSPADM

## 2023-08-07 RX ORDER — POTASSIUM CHLORIDE 7.45 MG/ML
10 INJECTION INTRAVENOUS PRN
Status: DISCONTINUED | OUTPATIENT
Start: 2023-08-07 | End: 2023-08-09 | Stop reason: HOSPADM

## 2023-08-07 RX ORDER — PROCHLORPERAZINE EDISYLATE 5 MG/ML
10 INJECTION INTRAMUSCULAR; INTRAVENOUS EVERY 6 HOURS PRN
Status: DISCONTINUED | OUTPATIENT
Start: 2023-08-07 | End: 2023-08-08

## 2023-08-07 RX ADMIN — HYDROMORPHONE HYDROCHLORIDE 0.5 MG: 1 INJECTION, SOLUTION INTRAMUSCULAR; INTRAVENOUS; SUBCUTANEOUS at 04:07

## 2023-08-07 RX ADMIN — PROCHLORPERAZINE EDISYLATE 10 MG: 5 INJECTION INTRAMUSCULAR; INTRAVENOUS at 21:42

## 2023-08-07 RX ADMIN — PANTOPRAZOLE SODIUM 40 MG: 40 TABLET, DELAYED RELEASE ORAL at 08:08

## 2023-08-07 RX ADMIN — PIPERACILLIN SODIUM AND TAZOBACTAM SODIUM 3375 MG: 3; .375 INJECTION, POWDER, LYOPHILIZED, FOR SOLUTION INTRAVENOUS at 04:13

## 2023-08-07 RX ADMIN — SODIUM CHLORIDE, PRESERVATIVE FREE 10 ML: 5 INJECTION INTRAVENOUS at 21:16

## 2023-08-07 RX ADMIN — HYDROMORPHONE HYDROCHLORIDE 0.5 MG: 1 INJECTION, SOLUTION INTRAMUSCULAR; INTRAVENOUS; SUBCUTANEOUS at 14:05

## 2023-08-07 RX ADMIN — FUROSEMIDE 20 MG: 10 INJECTION, SOLUTION INTRAMUSCULAR; INTRAVENOUS at 13:46

## 2023-08-07 RX ADMIN — ONDANSETRON 4 MG: 2 INJECTION INTRAMUSCULAR; INTRAVENOUS at 10:22

## 2023-08-07 RX ADMIN — HYDROMORPHONE HYDROCHLORIDE 0.5 MG: 1 INJECTION, SOLUTION INTRAMUSCULAR; INTRAVENOUS; SUBCUTANEOUS at 09:07

## 2023-08-07 RX ADMIN — HYDROMORPHONE HYDROCHLORIDE 0.5 MG: 1 INJECTION, SOLUTION INTRAMUSCULAR; INTRAVENOUS; SUBCUTANEOUS at 18:16

## 2023-08-07 RX ADMIN — SODIUM CHLORIDE: 9 INJECTION, SOLUTION INTRAVENOUS at 08:10

## 2023-08-07 RX ADMIN — SODIUM CHLORIDE, PRESERVATIVE FREE 10 ML: 5 INJECTION INTRAVENOUS at 08:09

## 2023-08-07 RX ADMIN — ONDANSETRON 4 MG: 2 INJECTION INTRAMUSCULAR; INTRAVENOUS at 04:08

## 2023-08-07 RX ADMIN — MAGNESIUM SULFATE HEPTAHYDRATE: 500 INJECTION, SOLUTION INTRAMUSCULAR; INTRAVENOUS at 17:11

## 2023-08-07 RX ADMIN — PIPERACILLIN SODIUM AND TAZOBACTAM SODIUM 3375 MG: 3; .375 INJECTION, POWDER, LYOPHILIZED, FOR SOLUTION INTRAVENOUS at 13:46

## 2023-08-07 RX ADMIN — PIPERACILLIN SODIUM AND TAZOBACTAM SODIUM 3375 MG: 3; .375 INJECTION, POWDER, LYOPHILIZED, FOR SOLUTION INTRAVENOUS at 21:16

## 2023-08-07 RX ADMIN — ONDANSETRON 4 MG: 2 INJECTION INTRAMUSCULAR; INTRAVENOUS at 16:13

## 2023-08-07 ASSESSMENT — PAIN DESCRIPTION - ONSET
ONSET: PROGRESSIVE
ONSET: GRADUAL
ONSET: ON-GOING
ONSET: ON-GOING

## 2023-08-07 ASSESSMENT — PAIN DESCRIPTION - FREQUENCY
FREQUENCY: INTERMITTENT
FREQUENCY: CONTINUOUS

## 2023-08-07 ASSESSMENT — PAIN DESCRIPTION - LOCATION
LOCATION: ABDOMEN

## 2023-08-07 ASSESSMENT — PAIN SCALES - GENERAL
PAINLEVEL_OUTOF10: 7
PAINLEVEL_OUTOF10: 10
PAINLEVEL_OUTOF10: 0
PAINLEVEL_OUTOF10: 0
PAINLEVEL_OUTOF10: 2
PAINLEVEL_OUTOF10: 10
PAINLEVEL_OUTOF10: 0
PAINLEVEL_OUTOF10: 8

## 2023-08-07 ASSESSMENT — PAIN DESCRIPTION - PAIN TYPE
TYPE: ACUTE PAIN

## 2023-08-07 ASSESSMENT — PAIN DESCRIPTION - ORIENTATION
ORIENTATION: RIGHT;UPPER
ORIENTATION: OTHER (COMMENT)

## 2023-08-07 ASSESSMENT — PAIN DESCRIPTION - DESCRIPTORS
DESCRIPTORS: PRESSURE;SORE
DESCRIPTORS: ACHING;SORE
DESCRIPTORS: BURNING;TENDER
DESCRIPTORS: PRESSURE;SHARP

## 2023-08-07 NOTE — CONSULTS
Comprehensive Nutrition Assessment    Type and Reason for Visit: Initial, Consult  Parenteral Nutrition Management (Hospitalists)    Nutrition Recommendations/Plan:   Parenteral Nutrition:  Central parenteral nutrition    Central line infusion  Initiate: Dex 10% , 4.25% AA 2 L   Infuse on a 12 hr cycle: 90mL 1220-0984, 185mL 5200-0163, 90mL 7583-1509, then off  Defer 250 ml 20% lipids  until trig lab results  To provide: 1020 kcal/d (65% of needs), 85 grams of protein/d (100% of needs), 200 grams of CHO/d and 2000 ml of total volume/d. Above regimen: Initiation regimen not intended to meet macronutrient needs  Lytes/L:   Sodium ( 75 meq NaCl), Potassium (KCl not available for inclusion in TPN at this time secondary to national shortages) Phosphorus ( 10 mmol KPO4), Calcium (4.5 meq), Magnesium 8 meq   Other additives:   MVI Monday Wednesday Friday due to [employer], MTE  Nutrition Related Medication Management: Thiamine Not indicated  Folic Acid Not indicated  Electrolyte Replacement Protocol PRN Initiate for Potassium, Phosphorus, and Magnesium   Labs:   BMP daily  Mg daily  Phos daily x 3 days at initiation then MWF    Triglyceride tomorrow  POC Glucoses/SSI Not indicated  Ionized Ca tomorrow  Meals and Snacks:  Diet: Continue current order- po for pleasure     Malnutrition Assessment:  Malnutrition Status: At risk for malnutrition (Comment) (PN dependent at baseline, po for pleasure)    Nutrition Assessment:  Nutrition History: Pt reports she is TPN dependent at baseline since 10/2022. She stated her last infusion was Saturday night, she reports she usually infuses 2100 to 0900. RD observed home TPN bag in pts room , dex 220g, protein 85g, lipids 20% 50g. Pt reports she consumes po for pleasure at home however she has not had anything due to nausea and vomiting. Pt stated she has gained wt recently however she stated it may be to BLE edema.  Per EMR wt history review 8/30/22 114lb, 2/2 114lb, 5/4

## 2023-08-07 NOTE — CONSULTS
History and Physical Initial Visit NOTE           8/9/2364    [facility]                        Date of Admission:  8/6/2023    The patient's chart is reviewed and the patient is discussed with the staff. Subjective:     Patient is a 50 y.o.  female seen and evaluated at the request of Josy Smith NP for effusion with evaluation for thoracentesis. Pt has hx of peritoneal carcinomatosis on chemo. She presented to ED for abdominal pain and vomiting x2 weeks on 8/6/23. Recent admission for the same. In ED, +leukocytosis, tachycardia and overall concerns for sepsis, so admitted again. Started on zosyn, IV fluids. Albumin 1.7, Hgb 6.8 today. She is currently on RA, saturations 98%. Denies SOB, cough, chest discomfort. She does have lots of oral secretions, but denies trouble swallowing. She utilizes TPN at home for nutrition through R chest port. Has never had pleural fluid collections. She has had lower extremity swelling. Plans for blood transfusion today. Non smoker, no lung history.       Review of Systems: Comprehensive ROS negative except in HPI    Current Outpatient Medications   Medication Instructions    ondansetron (ZOFRAN-ODT) 8 mg, Oral, 3 TIMES DAILY PRN    pantoprazole (PROTONIX) 40 mg, Oral, 2 times daily      Past Medical History:   Diagnosis Date    Abdominal pain 10/3/2022    Alteration in nutrition     per RD note TPN dependent 12 hours per day- [facility]    Anemia     Chemotherapy induced nausea and vomiting 10/14/2022    Colon cancer ([address]) dx 2/2022    followed by dr Mark EL-19 12/2020    no hospitalization    GERD (gastroesophageal reflux disease)     managed with med    History of blood transfusion 2022    History of colonoscopy 05/2018    Dr. Yamil Choi, nl (see media note), R 2028    Hx of blood clots 06/2022    per pt \"small clot on lung identified by CT scan\"  CT Scan impression:- Nonobstructive pulmonary filling defect

## 2023-08-07 NOTE — H&P
personally reviewed labs and tests:  Recent Labs:  Recent Results (from the past 24 hour(s))   EKG 12 Lead    Collection Time: 08/06/23 11:34 AM   Result Value Ref Range    Ventricular Rate 129 BPM    Atrial Rate 129 BPM    P-R Interval 130 ms    QRS Duration 62 ms    Q-T Interval 294 ms    QTc Calculation (Bazett) 430 ms    P Axis 31 degrees    R Axis 21 degrees    T Axis 10 degrees    Diagnosis       Sinus tachycardia  Cannot rule out Anterior infarct , age undetermined  Abnormal ECG  When compared with ECG of 24-JUL-2023 10:41,  PREVIOUS ECG IS PRESENT  Confirmed by Jb Guerra MD, Neetu Schneider ([id number]) on 8/6/2023 12:40:04 PM     Comprehensive Metabolic Panel    Collection Time: 08/06/23 12:19 PM   Result Value Ref Range    Sodium 138 133 - 143 mmol/L    Potassium 3.8 3.5 - 5.1 mmol/L    Chloride 103 101 - 110 mmol/L    CO2 29 21 - 32 mmol/L    Anion Gap 6 2 - 11 mmol/L    Glucose 103 (H) 65 - 100 mg/dL    BUN 22 6 - 23 MG/DL    Creatinine 0.40 (L) 0.6 - 1.0 MG/DL    Est, Glom Filt Rate >60 >60 ml/min/1.73m2    Calcium 8.5 8.3 - 10.4 MG/DL    Total Bilirubin 0.3 0.2 - 1.1 MG/DL    ALT 44 12 - 65 U/L    AST 32 15 - 37 U/L    Alk Phosphatase 192 (H) 50 - 136 U/L    Total Protein 5.9 (L) 6.3 - 8.2 g/dL    Albumin 1.9 (L) 3.5 - 5.0 g/dL    Globulin 4.0 2.8 - 4.5 g/dL    Albumin/Globulin Ratio 0.5 0.4 - 1.6     CBC with Auto Differential    Collection Time: 08/06/23 12:19 PM   Result Value Ref Range    WBC 15.4 (H) 4.3 - 11.1 K/uL    RBC 3.24 (L) 4.05 - 5.2 M/uL    Hemoglobin 8.2 (L) 11.7 - 15.4 g/dL    Hematocrit 28.9 (L) 35.8 - 46.3 %    MCV 89.2 82 - 102 FL    MCH 25.3 (L) 26.1 - 32.9 PG    MCHC 28.4 (L) 31.4 - 35.0 g/dL    RDW 21.5 (H) 11.9 - 14.6 %    Platelets 022 773 - 038 K/uL    MPV 11.1 9.4 - 12.3 FL    nRBC 0.02 0.0 - 0.2 K/uL    Differential Type AUTOMATED      Neutrophils % 90 (H) 43 - 78 %    Lymphocytes % 8 (L) 13 - 44 %    Monocytes % 2 (L) 4.0 - 12.0 %    Eosinophils % 0 (L) 0.5 - 7.8 %    Basophils % 0 0.0 -
Neutrophils Absolute 8.7 (H) 1.7 - 8.2 K/UL    Lymphocytes Absolute 1.2 0.5 - 4.6 K/UL    Monocytes Absolute 0.2 0.1 - 1.3 K/UL    Eosinophils Absolute 0.0 0.0 - 0.8 K/UL    Basophils Absolute 0.0 0.0 - 0.2 K/UL    Absolute Immature Granulocyte 0.1 0.0 - 0.5 K/UL   Comprehensive Metabolic Panel    Collection Time: 08/07/23  8:17 AM   Result Value Ref Range    Sodium 141 133 - 143 mmol/L    Potassium 3.9 3.5 - 5.1 mmol/L    Chloride 107 101 - 110 mmol/L    CO2 29 21 - 32 mmol/L    Anion Gap 5 2 - 11 mmol/L    Glucose 97 65 - 100 mg/dL    BUN 15 6 - 23 MG/DL    Creatinine 0.50 (L) 0.6 - 1.0 MG/DL    Est, Glom Filt Rate >60 >60 ml/min/1.73m2    Calcium 8.0 (L) 8.3 - 10.4 MG/DL    Total Bilirubin 0.5 0.2 - 1.1 MG/DL    ALT 45 12 - 65 U/L    AST 44 (H) 15 - 37 U/L    Alk Phosphatase 180 (H) 50 - 136 U/L    Total Protein 5.2 (L) 6.3 - 8.2 g/dL    Albumin 1.7 (L) 3.5 - 5.0 g/dL    Globulin 3.5 2.8 - 4.5 g/dL    Albumin/Globulin Ratio 0.5 0.4 - 1.6     Magnesium    Collection Time: 08/07/23  8:17 AM   Result Value Ref Range    Magnesium 2.0 1.8 - 2.4 mg/dL   Phosphorus    Collection Time: 08/07/23  8:17 AM   Result Value Ref Range    Phosphorus 3.5 2.5 - 4.5 MG/DL       Imaging:  Xray Result (most recent):  XR CHEST PORTABLE 08/06/2023    Narrative  Chest portable    CLINICAL INDICATION: Sepsis with history of metastatic malignancy, peritoneal  carcinomatosis, malignant ascites, right pleural effusion, gastric bypass    COMPARISON: 7/24/2023 radiograph, 5/30/2023 CT    TECHNIQUE: single AP portable view chest at 12:34 PM upright    FINDINGS: Lung volumes are slightly shallow leading to crowding. A moderate  right pleural effusion has enlarged. Stable right portacatheter, mediastinal  and hilar contours. Bilateral perihilar vascular congestion. No pneumothorax  or dense consolidation. Impression  1. Increased right pleural effusion. 2.  No acute change otherwise.         ASSESSMENT:  Principal Problem:    Sepsis

## 2023-08-07 NOTE — PLAN OF CARE
Problem: Safety - Adult  Goal: Free from fall injury  Outcome: Progressing  Flowsheets (Taken 8/6/2023 2139 by Toma Felty, RN)  Free From Fall Injury: Instruct family/caregiver on patient safety     Problem: Chronic Conditions and Co-morbidities  Goal: Patient's chronic conditions and co-morbidity symptoms are monitored and maintained or improved  Outcome: Progressing  Flowsheets (Taken 8/6/2023 2130 by Toma Felty, RN)  Care Plan - Patient's Chronic Conditions and Co-Morbidity Symptoms are Monitored and Maintained or Improved: Monitor and assess patient's chronic conditions and comorbid symptoms for stability, deterioration, or improvement     Problem: Pain  Goal: Verbalizes/displays adequate comfort level or baseline comfort level  Outcome: Progressing     Problem: Skin/Tissue Integrity  Goal: Absence of new skin breakdown  Description: 1. Monitor for areas of redness and/or skin breakdown  2. Assess vascular access sites hourly  3. Every 4-6 hours minimum:  Change oxygen saturation probe site  4. Every 4-6 hours:  If on nasal continuous positive airway pressure, respiratory therapy assess nares and determine need for appliance change or resting period.   Outcome: Progressing

## 2023-08-08 ENCOUNTER — APPOINTMENT (OUTPATIENT)
Dept: GENERAL RADIOLOGY | Age: 48
DRG: 871 | End: 2023-08-08
Payer: COMMERCIAL

## 2023-08-08 ENCOUNTER — TELEPHONE (OUTPATIENT)
Dept: GASTROENTEROLOGY | Age: 48
End: 2023-08-08

## 2023-08-08 PROBLEM — R18.0 MALIGNANT ASCITES: Status: ACTIVE | Noted: 2023-08-08

## 2023-08-08 LAB
ABO + RH BLD: NORMAL
ALBUMIN SERPL-MCNC: 1.7 G/DL (ref 3.5–5)
ALBUMIN/GLOB SERPL: 0.4 (ref 0.4–1.6)
ALP SERPL-CCNC: 171 U/L (ref 50–136)
ALT SERPL-CCNC: 40 U/L (ref 12–65)
ANION GAP SERPL CALC-SCNC: 4 MMOL/L (ref 2–11)
AST SERPL-CCNC: 22 U/L (ref 15–37)
BASOPHILS # BLD: 0 K/UL (ref 0–0.2)
BASOPHILS NFR BLD: 0 % (ref 0–2)
BILIRUB SERPL-MCNC: 1 MG/DL (ref 0.2–1.1)
BLD PROD TYP BPU: NORMAL
BLOOD BANK DISPENSE STATUS: NORMAL
BLOOD GROUP ANTIBODIES SERPL: NORMAL
BPU ID: NORMAL
BUN SERPL-MCNC: 23 MG/DL (ref 6–23)
CA-I BLD-MCNC: 4.56 MG/DL (ref 4–5.2)
CALCIUM SERPL-MCNC: 8.2 MG/DL (ref 8.3–10.4)
CHLORIDE SERPL-SCNC: 108 MMOL/L (ref 101–110)
CO2 SERPL-SCNC: 29 MMOL/L (ref 21–32)
CREAT SERPL-MCNC: 0.5 MG/DL (ref 0.6–1)
CROSSMATCH RESULT: NORMAL
DIFFERENTIAL METHOD BLD: ABNORMAL
EOSINOPHIL # BLD: 0 K/UL (ref 0–0.8)
EOSINOPHIL NFR BLD: 0 % (ref 0.5–7.8)
ERYTHROCYTE [DISTWIDTH] IN BLOOD BY AUTOMATED COUNT: 19.9 % (ref 11.9–14.6)
GLOBULIN SER CALC-MCNC: 3.8 G/DL (ref 2.8–4.5)
GLUCOSE SERPL-MCNC: 166 MG/DL (ref 65–100)
HCT VFR BLD AUTO: 26.4 % (ref 35.8–46.3)
HGB BLD-MCNC: 7.9 G/DL (ref 11.7–15.4)
IMM GRANULOCYTES # BLD AUTO: 0.1 K/UL (ref 0–0.5)
IMM GRANULOCYTES NFR BLD AUTO: 1 % (ref 0–5)
LYMPHOCYTES # BLD: 1.2 K/UL (ref 0.5–4.6)
LYMPHOCYTES NFR BLD: 12 % (ref 13–44)
MAGNESIUM SERPL-MCNC: 2.2 MG/DL (ref 1.8–2.4)
MCH RBC QN AUTO: 26.6 PG (ref 26.1–32.9)
MCHC RBC AUTO-ENTMCNC: 29.9 G/DL (ref 31.4–35)
MCV RBC AUTO: 88.9 FL (ref 82–102)
MONOCYTES # BLD: 0.3 K/UL (ref 0.1–1.3)
MONOCYTES NFR BLD: 3 % (ref 4–12)
NEUTS SEG # BLD: 8.7 K/UL (ref 1.7–8.2)
NEUTS SEG NFR BLD: 84 % (ref 43–78)
NRBC # BLD: 0.05 K/UL (ref 0–0.2)
PHOSPHATE SERPL-MCNC: 3.2 MG/DL (ref 2.5–4.5)
PLATELET # BLD AUTO: 323 K/UL (ref 150–450)
PMV BLD AUTO: 11.3 FL (ref 9.4–12.3)
POTASSIUM SERPL-SCNC: 3.5 MMOL/L (ref 3.5–5.1)
PROT SERPL-MCNC: 5.5 G/DL (ref 6.3–8.2)
RBC # BLD AUTO: 2.97 M/UL (ref 4.05–5.2)
SODIUM SERPL-SCNC: 141 MMOL/L (ref 133–143)
SPECIMEN EXP DATE BLD: NORMAL
TRIGL SERPL-MCNC: 131 MG/DL (ref 35–150)
UNIT DIVISION: 0
WBC # BLD AUTO: 10.5 K/UL (ref 4.3–11.1)

## 2023-08-08 PROCEDURE — 84100 ASSAY OF PHOSPHORUS: CPT

## 2023-08-08 PROCEDURE — 99232 SBSQ HOSP IP/OBS MODERATE 35: CPT | Performed by: INTERNAL MEDICINE

## 2023-08-08 PROCEDURE — 71045 X-RAY EXAM CHEST 1 VIEW: CPT

## 2023-08-08 PROCEDURE — 2500000003 HC RX 250 WO HCPCS: Performed by: INTERNAL MEDICINE

## 2023-08-08 PROCEDURE — 99254 IP/OBS CNSLTJ NEW/EST MOD 60: CPT | Performed by: INTERNAL MEDICINE

## 2023-08-08 PROCEDURE — 6360000002 HC RX W HCPCS: Performed by: INTERNAL MEDICINE

## 2023-08-08 PROCEDURE — 6370000000 HC RX 637 (ALT 250 FOR IP): Performed by: NURSE PRACTITIONER

## 2023-08-08 PROCEDURE — 1100000000 HC RM PRIVATE

## 2023-08-08 PROCEDURE — 6370000000 HC RX 637 (ALT 250 FOR IP): Performed by: INTERNAL MEDICINE

## 2023-08-08 PROCEDURE — 6360000002 HC RX W HCPCS: Performed by: NURSE PRACTITIONER

## 2023-08-08 PROCEDURE — 84478 ASSAY OF TRIGLYCERIDES: CPT

## 2023-08-08 PROCEDURE — 2580000003 HC RX 258: Performed by: INTERNAL MEDICINE

## 2023-08-08 PROCEDURE — 2500000003 HC RX 250 WO HCPCS: Performed by: NURSE PRACTITIONER

## 2023-08-08 PROCEDURE — 83735 ASSAY OF MAGNESIUM: CPT

## 2023-08-08 PROCEDURE — APPSS45 APP SPLIT SHARED TIME 31-45 MINUTES: Performed by: NURSE PRACTITIONER

## 2023-08-08 PROCEDURE — 82330 ASSAY OF CALCIUM: CPT

## 2023-08-08 PROCEDURE — 97530 THERAPEUTIC ACTIVITIES: CPT

## 2023-08-08 PROCEDURE — 87070 CULTURE OTHR SPECIMN AEROBIC: CPT

## 2023-08-08 PROCEDURE — 85025 COMPLETE CBC W/AUTO DIFF WBC: CPT

## 2023-08-08 PROCEDURE — 80053 COMPREHEN METABOLIC PANEL: CPT

## 2023-08-08 PROCEDURE — 87205 SMEAR GRAM STAIN: CPT

## 2023-08-08 RX ORDER — METOCLOPRAMIDE 10 MG/1
5 TABLET ORAL
Status: DISCONTINUED | OUTPATIENT
Start: 2023-08-08 | End: 2023-08-09 | Stop reason: HOSPADM

## 2023-08-08 RX ORDER — OXYCODONE HCL 5 MG/5 ML
5 SOLUTION, ORAL ORAL EVERY 4 HOURS PRN
Status: DISCONTINUED | OUTPATIENT
Start: 2023-08-08 | End: 2023-08-09 | Stop reason: HOSPADM

## 2023-08-08 RX ORDER — METOCLOPRAMIDE HYDROCHLORIDE 5 MG/ML
5 INJECTION INTRAMUSCULAR; INTRAVENOUS
Status: DISCONTINUED | OUTPATIENT
Start: 2023-08-08 | End: 2023-08-08

## 2023-08-08 RX ORDER — HYDROMORPHONE HYDROCHLORIDE 1 MG/ML
0.5 INJECTION, SOLUTION INTRAMUSCULAR; INTRAVENOUS; SUBCUTANEOUS EVERY 4 HOURS PRN
Status: DISCONTINUED | OUTPATIENT
Start: 2023-08-08 | End: 2023-08-09 | Stop reason: HOSPADM

## 2023-08-08 RX ADMIN — I.V. FAT EMULSION 250 ML: 20 EMULSION INTRAVENOUS at 17:25

## 2023-08-08 RX ADMIN — ONDANSETRON 4 MG: 2 INJECTION INTRAMUSCULAR; INTRAVENOUS at 14:28

## 2023-08-08 RX ADMIN — HYDROMORPHONE HYDROCHLORIDE 0.5 MG: 1 INJECTION, SOLUTION INTRAMUSCULAR; INTRAVENOUS; SUBCUTANEOUS at 20:09

## 2023-08-08 RX ADMIN — PIPERACILLIN SODIUM AND TAZOBACTAM SODIUM 3375 MG: 3; .375 INJECTION, POWDER, LYOPHILIZED, FOR SOLUTION INTRAVENOUS at 11:54

## 2023-08-08 RX ADMIN — ONDANSETRON 4 MG: 2 INJECTION INTRAMUSCULAR; INTRAVENOUS at 08:11

## 2023-08-08 RX ADMIN — SODIUM CHLORIDE, PRESERVATIVE FREE 10 ML: 5 INJECTION INTRAVENOUS at 20:10

## 2023-08-08 RX ADMIN — HYDROMORPHONE HYDROCHLORIDE 0.5 MG: 1 INJECTION, SOLUTION INTRAMUSCULAR; INTRAVENOUS; SUBCUTANEOUS at 02:43

## 2023-08-08 RX ADMIN — METOCLOPRAMIDE 5 MG: 10 TABLET ORAL at 20:09

## 2023-08-08 RX ADMIN — PANTOPRAZOLE SODIUM 40 MG: 40 TABLET, DELAYED RELEASE ORAL at 08:12

## 2023-08-08 RX ADMIN — METOCLOPRAMIDE 5 MG: 10 TABLET ORAL at 16:25

## 2023-08-08 RX ADMIN — PIPERACILLIN SODIUM AND TAZOBACTAM SODIUM 3375 MG: 3; .375 INJECTION, POWDER, LYOPHILIZED, FOR SOLUTION INTRAVENOUS at 20:09

## 2023-08-08 RX ADMIN — ONDANSETRON 4 MG: 2 INJECTION INTRAMUSCULAR; INTRAVENOUS at 00:30

## 2023-08-08 RX ADMIN — HYDROMORPHONE HYDROCHLORIDE 0.5 MG: 1 INJECTION, SOLUTION INTRAMUSCULAR; INTRAVENOUS; SUBCUTANEOUS at 08:12

## 2023-08-08 RX ADMIN — ENOXAPARIN SODIUM 40 MG: 100 INJECTION SUBCUTANEOUS at 08:12

## 2023-08-08 RX ADMIN — METOCLOPRAMIDE 5 MG: 5 INJECTION, SOLUTION INTRAMUSCULAR; INTRAVENOUS at 09:49

## 2023-08-08 RX ADMIN — POTASSIUM PHOSPHATE, MONOBASIC POTASSIUM PHOSPHATE, DIBASIC: 224; 236 INJECTION, SOLUTION, CONCENTRATE INTRAVENOUS at 17:26

## 2023-08-08 RX ADMIN — FUROSEMIDE 20 MG: 10 INJECTION, SOLUTION INTRAMUSCULAR; INTRAVENOUS at 08:12

## 2023-08-08 RX ADMIN — ONDANSETRON 4 MG: 2 INJECTION INTRAMUSCULAR; INTRAVENOUS at 20:09

## 2023-08-08 RX ADMIN — PANTOPRAZOLE SODIUM 40 MG: 40 TABLET, DELAYED RELEASE ORAL at 20:09

## 2023-08-08 RX ADMIN — PIPERACILLIN SODIUM AND TAZOBACTAM SODIUM 3375 MG: 3; .375 INJECTION, POWDER, LYOPHILIZED, FOR SOLUTION INTRAVENOUS at 04:30

## 2023-08-08 RX ADMIN — SODIUM CHLORIDE, PRESERVATIVE FREE 10 ML: 5 INJECTION INTRAVENOUS at 08:12

## 2023-08-08 ASSESSMENT — PAIN SCALES - GENERAL
PAINLEVEL_OUTOF10: 0
PAINLEVEL_OUTOF10: 10
PAINLEVEL_OUTOF10: 0
PAINLEVEL_OUTOF10: 7
PAINLEVEL_OUTOF10: 7
PAINLEVEL_OUTOF10: 0

## 2023-08-08 ASSESSMENT — PAIN DESCRIPTION - FREQUENCY
FREQUENCY: CONTINUOUS
FREQUENCY: CONTINUOUS
FREQUENCY: INTERMITTENT

## 2023-08-08 ASSESSMENT — PAIN DESCRIPTION - PAIN TYPE
TYPE: ACUTE PAIN
TYPE: CHRONIC PAIN
TYPE: CHRONIC PAIN

## 2023-08-08 ASSESSMENT — PAIN - FUNCTIONAL ASSESSMENT
PAIN_FUNCTIONAL_ASSESSMENT: PREVENTS OR INTERFERES SOME ACTIVE ACTIVITIES AND ADLS

## 2023-08-08 ASSESSMENT — PAIN DESCRIPTION - ONSET
ONSET: PROGRESSIVE
ONSET: ON-GOING
ONSET: PROGRESSIVE

## 2023-08-08 ASSESSMENT — PAIN DESCRIPTION - DIRECTION
RADIATING_TOWARDS: ACROSS ABD
RADIATING_TOWARDS: ACROSS ABD

## 2023-08-08 ASSESSMENT — PAIN DESCRIPTION - ORIENTATION
ORIENTATION: RIGHT;LEFT;MID
ORIENTATION: RIGHT;UPPER
ORIENTATION: RIGHT;LEFT;MID

## 2023-08-08 ASSESSMENT — PAIN DESCRIPTION - LOCATION
LOCATION: ABDOMEN

## 2023-08-08 ASSESSMENT — PAIN DESCRIPTION - DESCRIPTORS
DESCRIPTORS: SHARP
DESCRIPTORS: SHARP
DESCRIPTORS: DISCOMFORT;PRESSURE

## 2023-08-08 NOTE — PROGRESS NOTES
Nutrition F/U:  Assessment:  Pt seen during office visit w/ Dr. James Cohen, continues w/ nausea and pain, remains TPN dependent for estimated nutrition needs. Pt continues to have difficulty managing her secretions, Reglan pump never started - pt recently admitted to [facility], call placed to [facility] (Dr. Bhargav Gallegos) for follow up regarding Reglan pump. Pt receiving Taxol alone today. Labs notable for Cr (0.4), glucose (120), other nutrition related lab values WNL. Current BW: 138#, up 7# over the past 3 weeks. Intervention:  1. NPO - sips of liquids as tolerated  2. Continue w/ TPN - managed by Intr[facility]   3. Call placed to [facility] (Dr. Bhargav Gallegos) regarding Reglan pump - left message for nurse and awaiting call back. Monitoring/Evaluation:  1. RD to follow up during next office visit - follow up wt status, nutrition related lab values w/ TPN, symptom management. [address] [place] [address]  [phone]    Addendum (8/7):  Have not received return call from [facility] (Dr. Bhargav Gallegos) regarding Reglan pump. Pt has been admitted to [facility] on (8/6) - email sent to Oncology NP's relaying information about GI recommendation of Reglan pump in hopes of initiating while inpatient.

## 2023-08-08 NOTE — CONSULTS
Lindy Valdez (:  1975) is a 50 y.o. female, new patient here for evaluation of the following chief complaint(s):  Abdominal Pain         ASSESSMENT/PLAN:  [unfilled]  1. Peritoneal carcinomatosis secondary to metastasis to the peritoneum of unknown primary. 2.  The above findings are causing most likely significant compression on the GI tract which delays the transit of the secreted fluid in the GI tract and interfere with the ability of the patient to process her intestinal secretions which is resulting in intermittent vomiting and inability to take anything by mouth which makes her dependent on TPN. Recommendations:  1. Pantoprazole 40 mg IV every 12 hours to reduce gastric acid secretions. 2.  TPN as is. 3.  Antiemetics including IV Zofran and Reglan as tolerated. 4.  Agree with reaching out to Dr. Marcelo Lobato office for inquiry about their plans of making arrangements for outpatient Reglan pump. Subjective   SUBJECTIVE/OBJECTIVE  70-year-old female admitted on 2023. Patient was admitted with generalized abdominal pain, leukocytosis, intractable nausea and vomiting and malignant ascites. Apparently patient was seen by Dr. Kamari Glover who recommended Reglan pump to be arranged for the patient to use as outpatient. Patient is known with metastatic carcinoma of unknown primary with peritoneal carcinomatosis, on chronic TPN because of her inability to eat secondary to carcinomatosis involving the abdomen. She is under the care of the oncology service and she has been on chemotherapy. She was reported to have an EGD with findings of esophagitis and Candida. She had paracentesis on  with removal of 2.1 drained as her Pleurx catheter was clogged. On admission  white count 18 K, hemoglobin 8.2, tachycardic. Chest x-ray with increasing right pleural effusion. Ascitic fluid culture and urine culture pending. Patient was started on Zosyn.       Past Medical

## 2023-08-08 NOTE — PLAN OF CARE
Problem: Safety - Adult  Goal: Free from fall injury  8/8/2023 1013 by Jennifer Fuller RN  Outcome: Progressing  8/8/2023 0218 by Stephanie Hansen RN  Outcome: Progressing     Problem: Chronic Conditions and Co-morbidities  Goal: Patient's chronic conditions and co-morbidity symptoms are monitored and maintained or improved  8/8/2023 1013 by Jennifer Fuller RN  Outcome: Progressing  8/8/2023 0218 by Stephanie Hansen RN  Outcome: Progressing  Flowsheets (Taken 8/7/2023 2100)  Care Plan - Patient's Chronic Conditions and Co-Morbidity Symptoms are Monitored and Maintained or Improved:   Monitor and assess patient's chronic conditions and comorbid symptoms for stability, deterioration, or improvement   Collaborate with multidisciplinary team to address chronic and comorbid conditions and prevent exacerbation or deterioration   Update acute care plan with appropriate goals if chronic or comorbid symptoms are exacerbated and prevent overall improvement and discharge     Problem: Pain  Goal: Verbalizes/displays adequate comfort level or baseline comfort level  8/8/2023 1013 by Jennifer Fuller RN  Outcome: Progressing  Flowsheets (Taken 8/8/2023 0243 by Stephanie Hansen RN)  Verbalizes/displays adequate comfort level or baseline comfort level:   Encourage patient to monitor pain and request assistance   Assess pain using appropriate pain scale   Administer analgesics based on type and severity of pain and evaluate response   Implement non-pharmacological measures as appropriate and evaluate response   Consider cultural and social influences on pain and pain management  8/8/2023 0218 by Stephanie Hansen RN  Outcome: Progressing  Flowsheets (Taken 8/7/2023 2100)  Verbalizes/displays adequate comfort level or baseline comfort level:   Encourage patient to monitor pain and request assistance   Assess pain using appropriate pain scale   Administer analgesics based on type and severity of pain and evaluate response

## 2023-08-09 VITALS
DIASTOLIC BLOOD PRESSURE: 80 MMHG | BODY MASS INDEX: 24.53 KG/M2 | SYSTOLIC BLOOD PRESSURE: 115 MMHG | OXYGEN SATURATION: 98 % | TEMPERATURE: 98 F | WEIGHT: 147.25 LBS | HEART RATE: 105 BPM | RESPIRATION RATE: 18 BRPM | HEIGHT: 65 IN

## 2023-08-09 DIAGNOSIS — C78.6 PERITONEAL CARCINOMATOSIS (HCC): Primary | ICD-10-CM

## 2023-08-09 PROBLEM — E87.70 HYPERVOLEMIA: Status: ACTIVE | Noted: 2023-08-09

## 2023-08-09 LAB
ALBUMIN SERPL-MCNC: 1.8 G/DL (ref 3.5–5)
ALBUMIN/GLOB SERPL: 0.5 (ref 0.4–1.6)
ALP SERPL-CCNC: 191 U/L (ref 50–136)
ALT SERPL-CCNC: 44 U/L (ref 12–65)
ANION GAP SERPL CALC-SCNC: 5 MMOL/L (ref 2–11)
AST SERPL-CCNC: 27 U/L (ref 15–37)
BACTERIA SPEC CULT: NORMAL
BASOPHILS # BLD: 0 K/UL (ref 0–0.2)
BASOPHILS NFR BLD: 1 % (ref 0–2)
BILIRUB SERPL-MCNC: 0.5 MG/DL (ref 0.2–1.1)
BUN SERPL-MCNC: 22 MG/DL (ref 6–23)
CALCIUM SERPL-MCNC: 8.5 MG/DL (ref 8.3–10.4)
CHLORIDE SERPL-SCNC: 107 MMOL/L (ref 101–110)
CO2 SERPL-SCNC: 27 MMOL/L (ref 21–32)
CREAT SERPL-MCNC: 0.5 MG/DL (ref 0.6–1)
DIFFERENTIAL METHOD BLD: ABNORMAL
EOSINOPHIL # BLD: 0 K/UL (ref 0–0.8)
EOSINOPHIL NFR BLD: 0 % (ref 0.5–7.8)
ERYTHROCYTE [DISTWIDTH] IN BLOOD BY AUTOMATED COUNT: 19.9 % (ref 11.9–14.6)
GLOBULIN SER CALC-MCNC: 3.8 G/DL (ref 2.8–4.5)
GLUCOSE SERPL-MCNC: 141 MG/DL (ref 65–100)
HCT VFR BLD AUTO: 26.9 % (ref 35.8–46.3)
HGB BLD-MCNC: 7.9 G/DL (ref 11.7–15.4)
IMM GRANULOCYTES # BLD AUTO: 0.2 K/UL (ref 0–0.5)
IMM GRANULOCYTES NFR BLD AUTO: 2 % (ref 0–5)
LYMPHOCYTES # BLD: 1.5 K/UL (ref 0.5–4.6)
LYMPHOCYTES NFR BLD: 21 % (ref 13–44)
MAGNESIUM SERPL-MCNC: 2.1 MG/DL (ref 1.8–2.4)
MCH RBC QN AUTO: 26.1 PG (ref 26.1–32.9)
MCHC RBC AUTO-ENTMCNC: 29.4 G/DL (ref 31.4–35)
MCV RBC AUTO: 88.8 FL (ref 82–102)
MONOCYTES # BLD: 0.4 K/UL (ref 0.1–1.3)
MONOCYTES NFR BLD: 5 % (ref 4–12)
NEUTS SEG # BLD: 5.2 K/UL (ref 1.7–8.2)
NEUTS SEG NFR BLD: 71 % (ref 43–78)
NRBC # BLD: 0.12 K/UL (ref 0–0.2)
PHOSPHATE SERPL-MCNC: 3.9 MG/DL (ref 2.5–4.5)
PLATELET # BLD AUTO: 364 K/UL (ref 150–450)
PMV BLD AUTO: 11.2 FL (ref 9.4–12.3)
POTASSIUM SERPL-SCNC: 3 MMOL/L (ref 3.5–5.1)
PROT SERPL-MCNC: 5.6 G/DL (ref 6.3–8.2)
RBC # BLD AUTO: 3.03 M/UL (ref 4.05–5.2)
SERVICE CMNT-IMP: NORMAL
SODIUM SERPL-SCNC: 139 MMOL/L (ref 133–143)
WBC # BLD AUTO: 7.4 K/UL (ref 4.3–11.1)

## 2023-08-09 PROCEDURE — 6360000002 HC RX W HCPCS: Performed by: NURSE PRACTITIONER

## 2023-08-09 PROCEDURE — 2580000003 HC RX 258: Performed by: INTERNAL MEDICINE

## 2023-08-09 PROCEDURE — 6360000002 HC RX W HCPCS: Performed by: INTERNAL MEDICINE

## 2023-08-09 PROCEDURE — 36591 DRAW BLOOD OFF VENOUS DEVICE: CPT

## 2023-08-09 PROCEDURE — 99232 SBSQ HOSP IP/OBS MODERATE 35: CPT | Performed by: INTERNAL MEDICINE

## 2023-08-09 PROCEDURE — 2500000003 HC RX 250 WO HCPCS: Performed by: NURSE PRACTITIONER

## 2023-08-09 PROCEDURE — 80053 COMPREHEN METABOLIC PANEL: CPT

## 2023-08-09 PROCEDURE — 83735 ASSAY OF MAGNESIUM: CPT

## 2023-08-09 PROCEDURE — APPSS60 APP SPLIT SHARED TIME 46-60 MINUTES: Performed by: NURSE PRACTITIONER

## 2023-08-09 PROCEDURE — 99239 HOSP IP/OBS DSCHRG MGMT >30: CPT | Performed by: INTERNAL MEDICINE

## 2023-08-09 PROCEDURE — 84100 ASSAY OF PHOSPHORUS: CPT

## 2023-08-09 PROCEDURE — 85025 COMPLETE CBC W/AUTO DIFF WBC: CPT

## 2023-08-09 PROCEDURE — 6370000000 HC RX 637 (ALT 250 FOR IP): Performed by: NURSE PRACTITIONER

## 2023-08-09 PROCEDURE — 6370000000 HC RX 637 (ALT 250 FOR IP): Performed by: INTERNAL MEDICINE

## 2023-08-09 RX ORDER — ONDANSETRON 8 MG/1
8 TABLET, ORALLY DISINTEGRATING ORAL EVERY 8 HOURS PRN
Qty: 90 TABLET | Refills: 0 | Status: SHIPPED | OUTPATIENT
Start: 2023-08-09

## 2023-08-09 RX ORDER — NALOXONE HYDROCHLORIDE 4 MG/.1ML
1 SPRAY NASAL PRN
Qty: 2 EACH | Refills: 1 | Status: SHIPPED | OUTPATIENT
Start: 2023-08-09

## 2023-08-09 RX ORDER — METOCLOPRAMIDE 5 MG/1
5 TABLET ORAL
Qty: 120 TABLET | Refills: 3 | Status: SHIPPED | OUTPATIENT
Start: 2023-08-09

## 2023-08-09 RX ORDER — FUROSEMIDE 20 MG/1
20 TABLET ORAL DAILY
Qty: 5 TABLET | Refills: 0 | Status: SHIPPED | OUTPATIENT
Start: 2023-08-09 | End: 2023-08-14

## 2023-08-09 RX ORDER — OXYCODONE HCL 5 MG/5 ML
5-10 SOLUTION, ORAL ORAL EVERY 4 HOURS PRN
Qty: 120 ML | Refills: 0 | Status: SHIPPED | OUTPATIENT
Start: 2023-08-09 | End: 2023-08-16

## 2023-08-09 RX ADMIN — FUROSEMIDE 20 MG: 10 INJECTION, SOLUTION INTRAMUSCULAR; INTRAVENOUS at 08:07

## 2023-08-09 RX ADMIN — PIPERACILLIN SODIUM AND TAZOBACTAM SODIUM 3375 MG: 3; .375 INJECTION, POWDER, LYOPHILIZED, FOR SOLUTION INTRAVENOUS at 11:23

## 2023-08-09 RX ADMIN — SODIUM CHLORIDE, PRESERVATIVE FREE 10 ML: 5 INJECTION INTRAVENOUS at 08:17

## 2023-08-09 RX ADMIN — ONDANSETRON 4 MG: 2 INJECTION INTRAMUSCULAR; INTRAVENOUS at 03:33

## 2023-08-09 RX ADMIN — METOCLOPRAMIDE 5 MG: 10 TABLET ORAL at 08:16

## 2023-08-09 RX ADMIN — METOCLOPRAMIDE 5 MG: 10 TABLET ORAL at 16:08

## 2023-08-09 RX ADMIN — HYDROMORPHONE HYDROCHLORIDE 0.5 MG: 1 INJECTION, SOLUTION INTRAMUSCULAR; INTRAVENOUS; SUBCUTANEOUS at 09:25

## 2023-08-09 RX ADMIN — ONDANSETRON 4 MG: 2 INJECTION INTRAMUSCULAR; INTRAVENOUS at 08:17

## 2023-08-09 RX ADMIN — ENOXAPARIN SODIUM 40 MG: 100 INJECTION SUBCUTANEOUS at 08:07

## 2023-08-09 RX ADMIN — HYDROMORPHONE HYDROCHLORIDE 0.5 MG: 1 INJECTION, SOLUTION INTRAMUSCULAR; INTRAVENOUS; SUBCUTANEOUS at 13:34

## 2023-08-09 RX ADMIN — ONDANSETRON 4 MG: 2 INJECTION INTRAMUSCULAR; INTRAVENOUS at 16:08

## 2023-08-09 RX ADMIN — OXYCODONE HYDROCHLORIDE 5 MG: 5 SOLUTION ORAL at 16:08

## 2023-08-09 RX ADMIN — POTASSIUM CHLORIDE 20 MEQ: 400 INJECTION, SOLUTION INTRAVENOUS at 08:13

## 2023-08-09 RX ADMIN — HYDROMORPHONE HYDROCHLORIDE 0.5 MG: 1 INJECTION, SOLUTION INTRAMUSCULAR; INTRAVENOUS; SUBCUTANEOUS at 03:33

## 2023-08-09 RX ADMIN — METOCLOPRAMIDE 5 MG: 10 TABLET ORAL at 11:22

## 2023-08-09 RX ADMIN — PIPERACILLIN SODIUM AND TAZOBACTAM SODIUM 3375 MG: 3; .375 INJECTION, POWDER, LYOPHILIZED, FOR SOLUTION INTRAVENOUS at 03:42

## 2023-08-09 RX ADMIN — POTASSIUM CHLORIDE 20 MEQ: 400 INJECTION, SOLUTION INTRAVENOUS at 06:23

## 2023-08-09 ASSESSMENT — PAIN DESCRIPTION - FREQUENCY
FREQUENCY: CONTINUOUS
FREQUENCY: CONTINUOUS

## 2023-08-09 ASSESSMENT — PAIN DESCRIPTION - LOCATION
LOCATION: ABDOMEN

## 2023-08-09 ASSESSMENT — PAIN DESCRIPTION - DESCRIPTORS
DESCRIPTORS: ACHING
DESCRIPTORS: ACHING
DESCRIPTORS: SHARP

## 2023-08-09 ASSESSMENT — PAIN DESCRIPTION - ORIENTATION
ORIENTATION: RIGHT;LEFT;MID

## 2023-08-09 ASSESSMENT — PAIN SCALES - GENERAL
PAINLEVEL_OUTOF10: 7
PAINLEVEL_OUTOF10: 8
PAINLEVEL_OUTOF10: 8

## 2023-08-09 ASSESSMENT — PAIN DESCRIPTION - PAIN TYPE
TYPE: CHRONIC PAIN
TYPE: CHRONIC PAIN

## 2023-08-09 ASSESSMENT — PAIN DESCRIPTION - DIRECTION: RADIATING_TOWARDS: N

## 2023-08-09 ASSESSMENT — PAIN DESCRIPTION - ONSET
ONSET: ON-GOING
ONSET: GRADUAL

## 2023-08-09 NOTE — CARE COORDINATION
Pt chart reviewed for discharge planning. SW visited with pt. Pt is known to CM staff from previous care. Lives with spouse. She in still insured by her [employer] benefits and reports she is pending for disability. Pt receives home TPN from [facility] with no home health needs prior to this admission. CM will follow pt plan of care and resume home TPN as orders received and assist further with any other supportive care referrals pending pt clinical progress. Please consult case management if specific needs arise. 08/07/23 6211   Service Assessment   Patient Orientation Alert and Oriented   Cognition Alert   History Provided By Patient   Primary Caregiver Self   Support Systems Spouse/Significant Other;Children;Family Members   Patient's Healthcare Decision Maker is: Legal Next of Kin   PCP Verified by CM Yes   Last Visit to PCP Within last 3 months   Prior Functional Level Independent in ADLs/IADLs   Current Functional Level Independent in ADLs/IADLs   Can patient return to prior living arrangement Yes   Ability to make needs known: Good   Family able to assist with home care needs: Yes   Would you like for me to discuss the discharge plan with any other family members/significant others, and if so, who?  No   Financial Resources Other (Comment)  (COB[employer] insurance)   Community Resources Other (Comment)  (home TPN with [facility])   Social/Functional History   Lives With Spouse   Type of Home House   Occupation Other(comment)  (on medical leave with [employer] benefits)   Discharge Planning   Living Arrangements Spouse/Significant Other   Current Services Prior To Admission None   Potential Assistance Needed Other (Comment)  (home infusion TPN with [facility])   Potential Assistance Purchasing Medications No   Type of Home Care Services None   Patient expects to be discharged to: [facility]   Transition of Care Consult (CM Consult) Discharge Planning
has transportation   No further needs/supportive care orders received for CM at this time.   Pt will have close follow up at the [address] [place] [address],[address], RN 08/09/23 2:25 PM

## 2023-08-09 NOTE — PROGRESS NOTES
5452- 500mls of fluid drained via sterile technique from patient's RLQ PleurX drain. Drainage was blood tinged and brown. Dressing changed to RLQ. Patient tolerated fairly. 1822- PleurX fluid culture walked down to lab and handed off to lab employee. 1125- Offered to change patient's port needle and dressing to right upper chest and patient refused. Patient stated \"I want it changed on Thursday's like it has been\".  Port needle intact, dressing C/D/I to patient's right upper chest.
56- BSSR received from TRI Oliver- patient stable at present. Safety maintained. No s/sx of respiratory nor acute distress noted at present. 2659- Phosphorus added to lab drawn this AM; spoke to JASON in the lab.     0907- PRN SubQ Dilaudid administered per MD order and d/t patient's verbal c/o ABD pain. 0920- Hgb 6.8, MD aware and orders received. 1694- Blood consent form signed by patient and witnessed by this RN.     3231- Type and screen obtained and sent to lab via right chest port. 1022- PRN SIVP Zofran administered d/t patient's verbal c/o nausea. Popsicle and ginger ale offered; patient accepted but did not partake in, intake. 1119- Blood transfusion infusion began. 1304- Blood transfusion infusion completed- no s/sx of acute nor respiratory distress noted at present. 1346- IV Lasix 20mg administered    1405- PRN SubQ Dilaudid administered per MD order and d/t patient's verbal c/o RUQ pain. 1613- PRN SIVP Zofran administered d/t patient's verbal c/o nausea. 1711- TPN infusion began at 90ml/hr per order. 1811- TPN infusion increased to 185ml/hr per order. 1816- PRN SubQ Dilaudid administered per MD order and d/t patient's verbal c/o \"all over\" ABD pain.
ACUTE OCCUPATIONAL THERAPY GOALS:   (Developed with and agreed upon by patient and/or caregiver.)  1. Patient will complete lower body bathing and dressing with INDEPENDENCE and adaptive equipment as needed. 2. Patient will complete toileting with INDEPENDENCE. 3. Patient will complete grooming ADL standing at sink with SUPERVISION. 4. Patient will tolerate 23 minutes of OT treatment with 1-2 rest breaks to increase activity tolerance for ADLs. 5. Patient will complete functional transfers with STAND BY ASSIST and adaptive equipment as needed. Timeframe: 7 visits      OCCUPATIONAL THERAPY Initial Assessment, Daily Note, and PM       OT Visit Days: 1  Acknowledge Orders  Time  OT Charge Capture  Rehab Caseload Tracker      Aneta Oneil is a 50 y.o. female   PRIMARY DIAGNOSIS: Sepsis ([address])  Generalized abdominal pain [R10.84]  Malignant ascites [R18.0]  Intractable nausea and vomiting [R11.2]  Sepsis ([address]) [A41.9]  Leukocytosis, unspecified type [D72.829]       Reason for Referral: Generalized Muscle Weakness (M62.81)  Difficulty in walking, Not elsewhere classified (R26.2)  Other abnormalities of gait and mobility (R26.89)  Inpatient: Payor: Diana Mott / Plan: [facility] / Product Type: *No Product type* /     ASSESSMENT:     REHAB RECOMMENDATIONS:   Recommendation to date pending progress:  Setting:  No further skilled occupational therapy after discharge from hospital    Equipment:    None     ASSESSMENT:  Ms. Brett Menchaca is a 50 y.o. female who presents with peritoneal carcinomatosis, abdominal pain, and nausea. Today, pt supine in bed upon arrival and required Max encouragement to participate in OT session. She reports she lives at home with boyfriend and daughter and requires assistance with bADLs since chemotherapy, including dressing and bathing. Pt did not provide verbal report of pain when asked about her current pain level. Pt required SBA for bed mobility.  HHA x1-2 for functional mobility for
ACUTE OCCUPATIONAL THERAPY GOALS:   (Developed with and agreed upon by patient and/or caregiver.)  1. Patient will complete lower body bathing and dressing with INDEPENDENCE and adaptive equipment as needed. 2. Patient will complete toileting with INDEPENDENCE. 3. Patient will complete grooming ADL standing at sink with SUPERVISION. 4. Patient will tolerate 23 minutes of OT treatment with 1-2 rest breaks to increase activity tolerance for ADLs. 5. Patient will complete functional transfers with STAND BY ASSIST and adaptive equipment as needed. Timeframe: 7 visits      OCCUPATIONAL THERAPY: Daily Note PM   OT Visit Days: 2   Time In/Out  OT Charge Capture  Rehab Caseload Tracker  OT Orders    Juan Alberto Escoto is a 50 y.o. female   PRIMARY DIAGNOSIS: Sepsis ([address])  Generalized abdominal pain [R10.84]  Malignant ascites [R18.0]  Intractable nausea and vomiting [R11.2]  Sepsis ([address]) [A41.9]  Leukocytosis, unspecified type [D72.829]       Inpatient: Payor: Paras Powers / Plan: [facility] / Product Type: *No Product type* /     ASSESSMENT:     REHAB RECOMMENDATIONS: CURRENT LEVEL OF FUNCTION:  (Most Recently Demonstrated)   Recommendation to date pending progress:  Setting:  No further skilled occupational therapy after discharge from hospital    Equipment:    [address]:  Not Tested  Dressing:  Minimal Assist  Feeding/Grooming:  Not Tested  Toileting:  Not Tested  Functional Mobility:  Minimal Assist     ASSESSMENT:  Ms. Edwar Spencer demonstrates good tolerance of today's session. Pt asleep in chair with visitors at bedside upon arrival. Pt agreeable to participation in OT session with encouragement. Pt requires Min A to don/doff B shoes. Pt able to complete sit<>stand transfer with SBA. Pt able to ambulate out of  room and in hallway with HHA x1-2 for balance and safety. During ambulation, pt demonstrates decreased balance. Declined participation in other ADLs this session.  Pt left up in chair with call
ACUTE PHYSICAL THERAPY GOALS:   (Developed with and agreed upon by patient and/or caregiver.)    (1.) Katelyn Tse  will move from supine to sit and sit to supine  with INDEPENDENCE within 7 treatment day(s). (2.) Katelyn Tse will transfer from bed to chair and chair to bed with MODIFIED INDEPENDENCE using the least restrictive device within 7 treatment day(s). (3.) Katelyn Tse will ambulate with MODIFIED INDEPENDENCE for 250 feet with the least restrictive device within 7 treatment day(s). (4.) Katelyn Dedrick will perform standing static and dynamic balance activities x 20 minutes with SUPERVISION to improve safety within 7 treatment day(s). (5.) Katelyn Dedrick will ascend and descend 1 stairs using 1 hand rail(s) with SUPERVISION to improve functional mobility and safety within 7 treatment day(s). (6.) Katelyn Dedrick will perform therapeutic exercises x 20 min for HEP with INDEPENDENCE to improve strength, endurance, and functional mobility within 7 treatment day(s).        PHYSICAL THERAPY Initial Assessment, Daily Note, and PM  (Link to Caseload Tracking: PT Visit Days : 1  Acknowledge Orders  Time In/Out  PT Charge Capture  Rehab Caseload Tracker    Katelyn Tse is a 50 y.o. female   PRIMARY DIAGNOSIS: Sepsis ([address])  Generalized abdominal pain [R10.84]  Malignant ascites [R18.0]  Intractable nausea and vomiting [R11.2]  Sepsis ([address]) [A41.9]  Leukocytosis, unspecified type [D72.829]       Reason for Referral: Generalized Muscle Weakness (M62.81)  Difficulty in walking, Not elsewhere classified (R26.2)  Inpatient: Payor: Roxanna Ngo / Plan: Stephanie Fleming / Product Type: *No Product type* /     ASSESSMENT:     REHAB RECOMMENDATIONS:   Recommendation to date pending progress:  Setting:  Home Health Therapy    Equipment:    To Be Determined     ASSESSMENT:  Ms. Josephine Borges  is a 50year old F who presents with sepsis, PMHx includes metastatic peritoneal
Comprehensive Nutrition Assessment    Type and Reason for Visit: Reassess  Parenteral Nutrition Management (Hospitalists)    Nutrition Recommendations/Plan:   Parenteral Nutrition:  Central parenteral nutrition    Central line infusion  Initiate: Dex 10% , 4.25% AA 2 L   Infuse on a 12 hr cycle: 90mL 8043-5171, 185mL 8789-1963, 90mL 3907-2601, then off  Initiate 250 ml 20% lipids daily  To provide: 1520 kcal/d (98% of needs), 85 grams of protein/d (100% of needs), 200 grams of CHO/d and 2250 ml of total volume/d. Above regimen: Intended to meet macronutrient goals  Lytes/L:   Sodium ( 75 meq NaCl), Potassium (KCl not available for inclusion in TPN at this time secondary to national shortages) Phosphorus ( 12.5 mmol KPO4), Calcium (4.5 meq), Magnesium 8 meq   Other additives:   MVI Monday Wednesday Friday due to [employer], MTE  Nutrition Related Medication Management: Thiamine Not indicated  Folic Acid Not indicated  Electrolyte Replacement Protocol PRN Continue for Potassium, Phosphorus, and Magnesium   Labs:   BMP  CMP through 9/5  Mg daily  Phos daily x 3 days at initiation then MWF    Triglyceride weekly on monday  POC Glucoses/SSI Not indicated  Ionized Ca tomorrow  Meals and Snacks:  Diet: Continue current order- po for pleasure     Malnutrition Assessment:  Malnutrition Status: At risk for malnutrition (Comment) (PN dependent at baseline, po for pleasure)    Nutrition Assessment:  Nutrition History: Pt reports she is TPN dependent at baseline since 10/2022. She stated her last infusion was Saturday night, she reports she usually infuses 2100 to 0900. RD observed home TPN bag in pts room , dex 220g, protein 85g, lipids 20% 50g. Pt reports she consumes po for pleasure at home however she has not had anything due to nausea and vomiting. Pt stated she has gained wt recently however she stated it may be to BLE edema. Per EMR wt history review 8/30/22 114lb, 2/2 114lb, 5/4 126lb, 7/6 127lb, 8/3 138lb.
Danika Acevedo  Admission Date: 8/6/2023         Daily Progress Note: 8/8/2023    The patient's chart is reviewed and the patient is discussed with the staff. Background:   50 y.o.  female seen and evaluated at the request of Dmitri Whelan NP for effusion with evaluation for thoracentesis. Pt has hx of peritoneal carcinomatosis on chemo. She presented to ED for abdominal pain and vomiting x2 weeks on 8/6/23. Recent admission for the same. In ED, +leukocytosis, tachycardia and overall concerns for sepsis, so admitted again. Started on zosyn, IV fluids. Albumin 1.7, Hgb 6.8 today. She is on RA, saturations 98%. Denies SOB, cough, chest discomfort. She does have lots of oral secretions, but denies trouble swallowing. She utilizes TPN at home for nutrition through R chest port. Has never had pleural fluid collections. She has lower extremity swelling.  + anemia requiring transfusion. Non smoker, no lung history. Subjective:     Feels well, no new complaints. No vomiting today. Abd pleurX drained 500cc today and sent for culture. Denies shortness of breath.  Edema slightly better, but still present    Current Facility-Administered Medications   Medication Dose Route Frequency    HYDROmorphone HCl PF (DILAUDID) injection 0.5 mg  0.5 mg IntraVENous Q4H PRN    oxyCODONE (ROXICODONE) 5 MG/5ML solution 5 mg  5 mg Oral Q4H PRN    PN-Adult Premix 4.25/10 - Central Line   IntraVENous Continuous TPN    fat emulsion (INTRALIPID/NUTRILIPID) 20 % infusion 250 mL  250 mL IntraVENous Daily    metoclopramide (REGLAN) tablet 5 mg  5 mg Oral 4x Daily AC & HS    0.9 % sodium chloride infusion   IntraVENous PRN    furosemide (LASIX) injection 20 mg  20 mg IntraVENous Daily    sodium phosphate 10 mmol in sodium chloride 0.9 % 250 mL IVPB  10 mmol IntraVENous PRN    Or    sodium phosphate 15 mmol in sodium chloride 0.9 % 250 mL IVPB  15 mmol IntraVENous PRN    Or    sodium
END OF SHIFT SUMMARY:    New admission to room 526 this afternoon. Patient has home TPN at bedside and usually runs it for 12 hours on and 12 hours off (9a-9p). Dr. Nuvia Quiros aware via perfect serve; awaiting response. Patient has increased mouth saliva but is able to voice her needs. Port to right chest with C/D/I dressing noted. PleurX with C/D/I dressing noted to RLQ. Patient is AX0X4. Safety maintained. No s/sx of respiratory nor acute distress noted nor reported at present. Family at bedside.        Bedside shift report given to TRI Olsen.     Cr Dominguez RN
END OF SHIFT SUMMARY:    Significant vitals this shift:    HR elevated overnight    Significant labs this shift:    Hgb 7.9    Tests performed this shift:    Repeat CXR showed \"little interval change\"    Additional events this shift:   C/o pain, SQ dilaudid x1, pt complains that injection burns    Pt vomiting w/o nausea, total of 1L bilious emesis this shift        Bedside shift report given to oncoming TRI Cintron RN
END OF SHIFT SUMMARY:    Significant vitals this shift:    HR remained elevated overnight    Significant labs this shift:    K 3.0    Additional events this shift:   C/o abd pain, dilaudid x2  N/V, zofran x2    200mL bilious vomit overnight      Bedside shift report given to oncoming TRI Gautam, RN
Good visit with the patient and her family today. No distress noted.   Assured the family of our continued prayers
It is with great octavio we pray for your family today:        \" If you trust him to save your soul,  Why would you not also,trust him to care for your body and life? He has never refused to carry your burdens; He has never fainted under their weight. So then, be finished with anxious worry, and leave all your concerns behind,  Placing them in the hand of a gracious God. \"              Faithful God,    The weight of my illness is more than I can bear. I need your help. I place the full weight of my burdens in your loving care. Thank you for being so faithful to me.     Nash bentley  [phone]
Molly Hayden  Admission Date: 8/6/2023         Daily Progress Note: 8/9/2023    The patient's chart is reviewed and the patient is discussed with the staff. Background:   50 y.o.  female seen and evaluated at the request of Yifan Aguirre NP for effusion with evaluation for thoracentesis. Pt has hx of peritoneal carcinomatosis on chemo. She presented to ED for abdominal pain and vomiting x2 weeks on 8/6/23. Recent admission for the same. In ED, +leukocytosis, tachycardia and overall concerns for sepsis, so admitted again. Started on zosyn, IV fluids. Albumin 1.7, Hgb 6.8. She is on RA, saturations 98%. Denies SOB, cough, chest discomfort. She does have lots of oral secretions, but denies trouble swallowing. She utilizes TPN at home for nutrition through R chest port. Has never had pleural fluid collections, but has abd pleurx for ascites. She has lower extremity swelling.  + anemia requiring transfusion. Non smoker, no lung history. Subjective:     Feels well, no new complaints. Still with some nausea/vomiting overnight. Abd pleurX drained 500cc 8/8 and sent for culture. Denies shortness of breath, even on exertion;  no chest discomfort or cough. Edema slightly better, but still present. Good urine output documented.       Current Facility-Administered Medications   Medication Dose Route Frequency    PN-Adult Premix 4.25/10 - Central Line   IntraVENous Continuous TPN    HYDROmorphone HCl PF (DILAUDID) injection 0.5 mg  0.5 mg IntraVENous Q4H PRN    oxyCODONE (ROXICODONE) 5 MG/5ML solution 5 mg  5 mg Oral Q4H PRN    fat emulsion (INTRALIPID/NUTRILIPID) 20 % infusion 250 mL  250 mL IntraVENous Daily    metoclopramide (REGLAN) tablet 5 mg  5 mg Oral 4x Daily AC & HS    0.9 % sodium chloride infusion   IntraVENous PRN    furosemide (LASIX) injection 20 mg  20 mg IntraVENous Daily    sodium phosphate 10 mmol in sodium chloride 0.9 % 250 mL IVPB  10 mmol
Physical Therapy Note:    Attempted to see patient twice this AM for physical therapy evaluation session. Patient declined treatment on first attempt, receiving blood on second. Will follow and re-attempt as schedule permits/patient available.  Thank you,    WHITNEY SOLORIO, PT     Rehab Caseload Tracker
Physician Progress Note      PATIENT:               Everardo Dong  CSN #:                  [id number]  :                       1975  ADMIT DATE:       2023 11:41 AM  DISCH DATE:  RESPONDING  PROVIDER #:        Judi Hussein MD        QUERY TEXT:    Type of Pleural Effusion: Please provide further specificity, if known. Clinical indicators include: cancer, malignancy, fluid  culture, metastasis, malignant, pleural effusion, pneumothorax, pleural fluid,   carcinoma  Options provided:  -- Pleural effusion caused by malignancy  -- Pleural effusion caused by congestive heart failure  -- Hydrothorax or chylous effusion  -- Hemothorax  -- Influenzal pleural effusion  -- Other - I will add my own diagnosis  -- Disagree - Not applicable / Not valid  -- Disagree - Clinically Unable to determine / Unknown        PROVIDER RESPONSE TEXT:    The patient has pleural effusion caused by malignancy.       Electronically signed by:  Judi Hussein MD 2023 12:21 PM
Physician Progress Note      PATIENT:               Merlin Love  CSN #:                  [id number]  :                       1975  ADMIT DATE:       2023 11:41 AM  DISCH DATE:  RESPONDING  PROVIDER #:        Mellisa Jarquin MD          QUERY TEXT:    Pt admitted with sepsis and has malnutrition documented in 23 PN. Please   further specify type of malnutrition with documentation in the medical record. The medical record reflects the following:  Risk Factors: immunocompromised, sepsis, malignant ascites  Clinical Indicators: Malnutrition assigned on   Treatment: TPN    ASPEN Criteria:    https://aspenjournals. onlinelibrary. massey. com/doi/full/10.1177/212297162290058  5  Options provided:  -- Mild Malnutrition  -- Moderate Malnutrition  -- Severe Malnutrition  -- Mild Protein calorie malnutrition  -- Moderate Protein calorie malnutrition  -- Severe Protein calorie malnutrition  -- Other - I will add my own diagnosis  -- Disagree - Not applicable / Not valid  -- Disagree - Clinically unable to determine / Unknown  -- Refer to Clinical Documentation Reviewer    PROVIDER RESPONSE TEXT:    This patient has mild malnutrition.     Query created by: Rogers Leal on 2023 9:46 AM      Electronically signed by:  Mellisa Jarquin MD 2023 2:41 PM
TRANSFER - IN REPORT:    Verbal report received from [place], [place] on Danika Acevedo  being received from [facility] ER for routine progression of patient care      Report consisted of patient's Situation, Background, Assessment and   Recommendations(SBAR). Information from the following report(s) Nurse Handoff Report, ED Encounter Summary, ED SBAR, Intake/Output, MAR, Recent Results, Neuro Assessment, and Event Log was reviewed with the receiving nurse. Opportunity for questions and clarification was provided. Transportation set up by ER RN. Room 526 set up to receive patient into our care.
[facility] Hematology & Oncology        Inpatient Hematology / Oncology Progress Note    Reason for Admission:  Generalized abdominal pain [R10.84]  Malignant ascites [R18.0]  Intractable nausea and vomiting [R11.2]  Sepsis ([address]) [A41.9]  Leukocytosis, unspecified type [D72.829]    24 Hour Events:  Afebrile, tachycardic  Pulm started on daily Lasix  pBNP low at 17  Working on obtaining Reglan pump, will go ahead and schedule Reglan    Transfusions: None  Replacements: None    ROS:  Constitutional: Negative for fever, chills. CV: Negative for chest pain, palpitations, edema. Respiratory: Negative for dyspnea, cough, wheezing. GI: +abd pain, nausea, vomiting. Negative for diarrhea. 10 point review of systems is otherwise negative with the exception of the elements mentioned above in the HPI.       No Known Allergies  Past Medical History:   Diagnosis Date    Abdominal pain 10/3/2022    Alteration in nutrition     per RD note TPN dependent 12 hours per day- [facility]    Anemia     Chemotherapy induced nausea and vomiting 10/14/2022    Colon cancer ([address]) dx 2022    followed by dr Dwayne Kimball    COVID-19 2020    no hospitalization    GERD (gastroesophageal reflux disease)     managed with med    History of blood transfusion     History of colonoscopy 2018    Dr. Nohemi Galvez, nl (see media note), R     Hx of blood clots 2022    per pt \"small clot on lung identified by CT scan\"  CT Scan impression:- Nonobstructive pulmonary filling defect involving Left Lower Lobe     Hypotension     asymptomatic    Intractable pain 2023    Obstruction of fallopian tube     per pt has \"1 good tube\"    Peritoneal carcinomatosis ([address])     chemo; abdominal pleurx    Weight loss     80lbs weight loss after gastric sleeve     Past Surgical History:   Procedure Laterality Date     SECTION  2009    CYSTOSCOPY Bilateral 2022    CYSTOSCOPY BILATERAL RETROGRADE PYELOGRAM performed by Cristian Miranda MD
Daily Nutrient Needs:  Energy (kcal/day): 8583-1596(54-79 kcal/kg) (Kcal/kg Weight used: 62.3 kg Other (Comment) (8/3 onc visit)  Protein (g/day): 75-93 (1.2-1.5 g/kg) Weight Used: (Other (Comment) (8/3 onc visit)) 62.3 kg  Fluid (ml/day):   (1 ml/kcal)    Nutrition Diagnosis:   Inadequate oral intake related to altered GI function as evidenced by  (PN dependent at baseline, metastatic peritoneal carcinamotosis)  Nutrition Interventions:   Food and/or Nutrient Delivery: Modify Parenteral Nutrition     Coordination of Nutrition Care: Continue to monitor while inpatient       Goals:   Previous Goal Met: Goal(s) Achieved  Active Goal:  (maintain PN at goal rate for primary needs)       Nutrition Monitoring and Evaluation:      Food/Nutrient Intake Outcomes: Food and Nutrient Intake, Parenteral Nutrition Intake/Tolerance  Physical Signs/Symptoms Outcomes: Biochemical Data, GI Status, Nausea or Vomiting, Fluid Status or Edema, Weight, Meal Time Behavior    Discharge Planning:    Parenteral Nutrition    Mendel Shearer, [address]
atraumatic  Eyes:  Sclerae pale. Pupils equally round. ENT:  Nares appear normal.  Moist oral mucosa  Neck:  No restricted ROM. Trachea midline   CV:   RRR. No m/r/g. No jugular venous distension. Lungs:   Decreased breath sounds b/l; no wheezing / rhonchi, no accessory muscles in use  Abdomen:   Protuberant, mild tenderness on palpation; no guarding, +BSx4  Extremities: Depednent b/l LE edema  Skin:     Warm and dry.     Neuro:  A&Ox3  Psych:  Flat affect    I have personally reviewed labs and tests:  Recent Labs:  Recent Results (from the past 48 hour(s))   EKG 12 Lead    Collection Time: 08/06/23 11:34 AM   Result Value Ref Range    Ventricular Rate 129 BPM    Atrial Rate 129 BPM    P-R Interval 130 ms    QRS Duration 62 ms    Q-T Interval 294 ms    QTc Calculation (Bazett) 430 ms    P Axis 31 degrees    R Axis 21 degrees    T Axis 10 degrees    Diagnosis       Sinus tachycardia  Cannot rule out Anterior infarct , age undetermined  Abnormal ECG  When compared with ECG of 24-JUL-2023 10:41,  PREVIOUS ECG IS PRESENT  Confirmed by Austyn Hale MD, Marely Hooks ([id number]) on 8/6/2023 12:40:04 PM     Urinalysis    Collection Time: 08/06/23 12:00 PM   Result Value Ref Range    Color, UA DARK YELLOW      Appearance CLEAR      Specific Gravity, UA 1.032 (H) 1.001 - 1.023      pH, Urine 5.5 5.0 - 9.0      Protein, UA 30 (A) NEG mg/dL    Glucose, UA Negative mg/dL    Ketones, Urine Negative NEG mg/dL    Bilirubin Urine Negative NEG      Blood, Urine Negative NEG      Urobilinogen, Urine 1.0 0.2 - 1.0 EU/dL    Nitrite, Urine Negative NEG      Leukocyte Esterase, Urine TRACE (A) NEG      WBC, UA 0-3 0 /hpf    Epithelial Cells UA 0-3 0 /hpf    BACTERIA, URINE TRACE 0 /hpf    Yeast, UA OCCASIONAL      Other observations RESULTS VERIFIED MANUALLY     Comprehensive Metabolic Panel    Collection Time: 08/06/23 12:19 PM   Result Value Ref Range    Sodium 138 133 - 143 mmol/L    Potassium 3.8 3.5 - 5.1 mmol/L    Chloride 103 101 - 110

## 2023-08-09 NOTE — DISCHARGE SUMMARY
admission. WBC 18k, Hgb 8.2, tachycardic. EKG with sinus tach. CXR with increasing R pleural effusion. Bcx-NGTD. Ascitic fluid Cx and Ucx pending. On Zosyn. We were asked for recommendations for our patient. See course of hospitalization below. Pt was treated with Zosyn for possible infection. Bcx-NGTD. Ucx with MSF. Ascitic cx- NGTD. She was evaluated by pulm for R pleural effusion. She is asymptomatic. Pulm started on Lasix daily and she will continue this for a few days as OP. No thoracentesis was indicated at this time. pBNP low at 17. GI was consulted for her chronic vomiting. Recommended to continue Protonix BID and agrees with plan for Reglan pump as OP. Pt currently on scheduled Reglan. Clinic working to contact Dr. Lazarus Bernal office to obtain a Reglan pump for patient. She is feeling better overall, but vomiting not completely resolved. She states she feels well enough to discharge home today. She is scheduled to f/u with Dr. Zaina Dela Cruz tomorrow, 8/10. Advised to call with fever, chills, uncontrollable symptoms, or with any other concerns. 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 D8 Taxol due 8/10 - no plans to give while admitted     Sepsis  - Bcx-NGTD  - Ascitic fluid Cx and Bcx pending  - On Zosyn  8/8 Remains afebrile. Bcx-NGTD. Ucx with MSF. Ascitic cx pending. 8/9 Remains afebrile. Bcx-NGTD. Ascitic cx pending. Nausea / Vomiting  - antiemetics prn  8/8 Pt with ongoing N/V. Office has been unable to obtain Reglan pump for pt as OP. Consult GI for assistance. Will go ahead and start scheduled Reglan. Had initially ordered as IV, will change to po in an effort to prepare for discharge. 8/9 c/o ongoing vomiting. GI recommends Protonix BID, Reglan, and pump as OP. Ascites  - has peritX     R pleural effusion  - CXR with increasing R pleural effusion  - Consult pulm to eval for thora  8/8 Pulm started Lasix daily.

## 2023-08-10 ENCOUNTER — OFFICE VISIT (OUTPATIENT)
Dept: PALLATIVE CARE | Age: 48
End: 2023-08-10
Payer: COMMERCIAL

## 2023-08-10 ENCOUNTER — HOSPITAL ENCOUNTER (OUTPATIENT)
Dept: INFUSION THERAPY | Age: 48
Discharge: HOME | End: 2023-08-10
Payer: COMMERCIAL

## 2023-08-10 ENCOUNTER — CARE COORDINATION (OUTPATIENT)
Dept: CARE COORDINATION | Facility: CLINIC | Age: 48
End: 2023-08-10

## 2023-08-10 ENCOUNTER — OFFICE VISIT (OUTPATIENT)
Dept: ONCOLOGY | Age: 48
End: 2023-08-10
Payer: COMMERCIAL

## 2023-08-10 VITALS
RESPIRATION RATE: 14 BRPM | SYSTOLIC BLOOD PRESSURE: 116 MMHG | HEART RATE: 106 BPM | TEMPERATURE: 98.2 F | OXYGEN SATURATION: 100 % | WEIGHT: 151.6 LBS | BODY MASS INDEX: 25.88 KG/M2 | HEIGHT: 64 IN | DIASTOLIC BLOOD PRESSURE: 81 MMHG

## 2023-08-10 DIAGNOSIS — R11.2 INTRACTABLE NAUSEA AND VOMITING: ICD-10-CM

## 2023-08-10 DIAGNOSIS — R12 HEARTBURN: ICD-10-CM

## 2023-08-10 DIAGNOSIS — C80.1 METASTASIS TO PERITONEUM OF UNKNOWN PRIMARY (HCC): Primary | ICD-10-CM

## 2023-08-10 DIAGNOSIS — C80.1 CARCINOMA OF UNKNOWN PRIMARY (HCC): ICD-10-CM

## 2023-08-10 DIAGNOSIS — C78.6 PERITONEAL CARCINOMATOSIS (HCC): ICD-10-CM

## 2023-08-10 DIAGNOSIS — G89.3 CANCER RELATED PAIN: Primary | ICD-10-CM

## 2023-08-10 DIAGNOSIS — Z51.5 ENCOUNTER FOR PALLIATIVE CARE: ICD-10-CM

## 2023-08-10 DIAGNOSIS — C78.6 METASTASIS TO PERITONEUM OF UNKNOWN PRIMARY (HCC): Primary | ICD-10-CM

## 2023-08-10 DIAGNOSIS — Z71.89 COUNSELING REGARDING ADVANCED CARE PLANNING AND GOALS OF CARE: ICD-10-CM

## 2023-08-10 DIAGNOSIS — R53.0 NEOPLASTIC MALIGNANT RELATED FATIGUE: ICD-10-CM

## 2023-08-10 LAB
ALBUMIN SERPL-MCNC: 1.8 G/DL (ref 3.5–5)
ALBUMIN/GLOB SERPL: 0.5 (ref 0.4–1.6)
ALP SERPL-CCNC: 193 U/L (ref 50–136)
ALT SERPL-CCNC: 44 U/L (ref 12–65)
ANION GAP SERPL CALC-SCNC: 7 MMOL/L (ref 2–11)
AST SERPL-CCNC: 29 U/L (ref 15–37)
BACTERIA SPEC CULT: NORMAL
BASOPHILS # BLD: 0 K/UL (ref 0–0.2)
BASOPHILS NFR BLD: 0 % (ref 0–2)
BILIRUB SERPL-MCNC: 0.5 MG/DL (ref 0.2–1.1)
BUN SERPL-MCNC: 24 MG/DL (ref 6–23)
CALCIUM SERPL-MCNC: 8.4 MG/DL (ref 8.3–10.4)
CHLORIDE SERPL-SCNC: 105 MMOL/L (ref 101–110)
CO2 SERPL-SCNC: 29 MMOL/L (ref 21–32)
CREAT SERPL-MCNC: 0.4 MG/DL (ref 0.6–1)
DIFFERENTIAL METHOD BLD: ABNORMAL
EOSINOPHIL # BLD: 0 K/UL (ref 0–0.8)
EOSINOPHIL NFR BLD: 0 % (ref 0.5–7.8)
ERYTHROCYTE [DISTWIDTH] IN BLOOD BY AUTOMATED COUNT: 20.4 % (ref 11.9–14.6)
GLOBULIN SER CALC-MCNC: 3.9 G/DL (ref 2.8–4.5)
GLUCOSE SERPL-MCNC: 99 MG/DL (ref 65–100)
GRAM STN SPEC: NORMAL
GRAM STN SPEC: NORMAL
HCT VFR BLD AUTO: 25.8 % (ref 35.8–46.3)
HGB BLD-MCNC: 7.6 G/DL (ref 11.7–15.4)
IMM GRANULOCYTES # BLD AUTO: 0.1 K/UL (ref 0–0.5)
IMM GRANULOCYTES NFR BLD AUTO: 2 % (ref 0–5)
LYMPHOCYTES # BLD: 1.1 K/UL (ref 0.5–4.6)
LYMPHOCYTES NFR BLD: 15 % (ref 13–44)
MAGNESIUM SERPL-MCNC: 2.1 MG/DL (ref 1.8–2.4)
MCH RBC QN AUTO: 25.9 PG (ref 26.1–32.9)
MCHC RBC AUTO-ENTMCNC: 29.5 G/DL (ref 31.4–35)
MCV RBC AUTO: 87.8 FL (ref 82–102)
MONOCYTES # BLD: 0.6 K/UL (ref 0.1–1.3)
MONOCYTES NFR BLD: 8 % (ref 4–12)
NEUTS SEG # BLD: 5.4 K/UL (ref 1.7–8.2)
NEUTS SEG NFR BLD: 75 % (ref 43–78)
NRBC # BLD: 0.21 K/UL (ref 0–0.2)
PLATELET # BLD AUTO: 329 K/UL (ref 150–450)
PMV BLD AUTO: 9.9 FL (ref 9.4–12.3)
POTASSIUM SERPL-SCNC: 3.4 MMOL/L (ref 3.5–5.1)
PROT SERPL-MCNC: 5.7 G/DL (ref 6.3–8.2)
RBC # BLD AUTO: 2.94 M/UL (ref 4.05–5.2)
SERVICE CMNT-IMP: NORMAL
SODIUM SERPL-SCNC: 141 MMOL/L (ref 133–143)
WBC # BLD AUTO: 7.2 K/UL (ref 4.3–11.1)

## 2023-08-10 PROCEDURE — 6360000002 HC RX W HCPCS: Performed by: NURSE PRACTITIONER

## 2023-08-10 PROCEDURE — A4216 STERILE WATER/SALINE, 10 ML: HCPCS | Performed by: INTERNAL MEDICINE

## 2023-08-10 PROCEDURE — 2580000003 HC RX 258: Performed by: INTERNAL MEDICINE

## 2023-08-10 PROCEDURE — 83735 ASSAY OF MAGNESIUM: CPT

## 2023-08-10 PROCEDURE — 96376 TX/PRO/DX INJ SAME DRUG ADON: CPT

## 2023-08-10 PROCEDURE — 80053 COMPREHEN METABOLIC PANEL: CPT

## 2023-08-10 PROCEDURE — 96367 TX/PROPH/DG ADDL SEQ IV INF: CPT

## 2023-08-10 PROCEDURE — 36591 DRAW BLOOD OFF VENOUS DEVICE: CPT

## 2023-08-10 PROCEDURE — 96368 THER/DIAG CONCURRENT INF: CPT

## 2023-08-10 PROCEDURE — 96375 TX/PRO/DX INJ NEW DRUG ADDON: CPT

## 2023-08-10 PROCEDURE — 99215 OFFICE O/P EST HI 40 MIN: CPT | Performed by: NURSE PRACTITIONER

## 2023-08-10 PROCEDURE — 96413 CHEMO IV INFUSION 1 HR: CPT

## 2023-08-10 PROCEDURE — 99215 OFFICE O/P EST HI 40 MIN: CPT | Performed by: INTERNAL MEDICINE

## 2023-08-10 PROCEDURE — 2500000003 HC RX 250 WO HCPCS: Performed by: INTERNAL MEDICINE

## 2023-08-10 PROCEDURE — 6360000002 HC RX W HCPCS: Performed by: INTERNAL MEDICINE

## 2023-08-10 PROCEDURE — 99497 ADVNCD CARE PLAN 30 MIN: CPT | Performed by: NURSE PRACTITIONER

## 2023-08-10 PROCEDURE — 85025 COMPLETE CBC W/AUTO DIFF WBC: CPT

## 2023-08-10 RX ORDER — FAMOTIDINE 10 MG/ML
20 INJECTION, SOLUTION INTRAVENOUS
Status: CANCELLED | OUTPATIENT
Start: 2023-08-10

## 2023-08-10 RX ORDER — SODIUM CHLORIDE 0.9 % (FLUSH) 0.9 %
5-40 SYRINGE (ML) INJECTION PRN
OUTPATIENT
Start: 2023-08-10

## 2023-08-10 RX ORDER — SODIUM CHLORIDE 9 MG/ML
5-250 INJECTION, SOLUTION INTRAVENOUS PRN
OUTPATIENT
Start: 2023-08-10

## 2023-08-10 RX ORDER — ALBUTEROL SULFATE 90 UG/1
4 AEROSOL, METERED RESPIRATORY (INHALATION) PRN
OUTPATIENT
Start: 2023-08-10

## 2023-08-10 RX ORDER — SODIUM CHLORIDE 9 MG/ML
5-250 INJECTION, SOLUTION INTRAVENOUS PRN
Status: DISCONTINUED | OUTPATIENT
Start: 2023-08-10 | End: 2023-08-11 | Stop reason: HOSPADM

## 2023-08-10 RX ORDER — SODIUM CHLORIDE 0.9 % (FLUSH) 0.9 %
5-40 SYRINGE (ML) INJECTION PRN
Status: CANCELLED | OUTPATIENT
Start: 2023-08-10

## 2023-08-10 RX ORDER — POTASSIUM CHLORIDE 7.45 MG/ML
10 INJECTION INTRAVENOUS
Status: COMPLETED | OUTPATIENT
Start: 2023-08-10 | End: 2023-08-10

## 2023-08-10 RX ORDER — ONDANSETRON 2 MG/ML
8 INJECTION INTRAMUSCULAR; INTRAVENOUS ONCE
Status: CANCELLED | OUTPATIENT
Start: 2023-08-10 | End: 2023-08-10

## 2023-08-10 RX ORDER — DIPHENHYDRAMINE HYDROCHLORIDE 50 MG/ML
50 INJECTION INTRAMUSCULAR; INTRAVENOUS ONCE
Status: COMPLETED | OUTPATIENT
Start: 2023-08-10 | End: 2023-08-10

## 2023-08-10 RX ORDER — SODIUM CHLORIDE 0.9 % (FLUSH) 0.9 %
5-40 SYRINGE (ML) INJECTION PRN
Status: DISCONTINUED | OUTPATIENT
Start: 2023-08-10 | End: 2023-08-10 | Stop reason: HOSPADM

## 2023-08-10 RX ORDER — SODIUM CHLORIDE 0.9 % (FLUSH) 0.9 %
5-40 SYRINGE (ML) INJECTION PRN
Status: DISCONTINUED | OUTPATIENT
Start: 2023-08-10 | End: 2023-08-11 | Stop reason: HOSPADM

## 2023-08-10 RX ORDER — ONDANSETRON 2 MG/ML
8 INJECTION INTRAMUSCULAR; INTRAVENOUS ONCE
Status: COMPLETED | OUTPATIENT
Start: 2023-08-10 | End: 2023-08-10

## 2023-08-10 RX ORDER — DIPHENHYDRAMINE HYDROCHLORIDE 50 MG/ML
50 INJECTION INTRAMUSCULAR; INTRAVENOUS ONCE
Status: CANCELLED | OUTPATIENT
Start: 2023-08-10 | End: 2023-08-10

## 2023-08-10 RX ORDER — EPINEPHRINE 1 MG/ML
0.3 INJECTION, SOLUTION, CONCENTRATE INTRAVENOUS PRN
OUTPATIENT
Start: 2023-08-10

## 2023-08-10 RX ORDER — MORPHINE SULFATE 2 MG/ML
2 INJECTION, SOLUTION INTRAMUSCULAR; INTRAVENOUS ONCE
Status: COMPLETED | OUTPATIENT
Start: 2023-08-10 | End: 2023-08-10

## 2023-08-10 RX ORDER — SODIUM CHLORIDE 9 MG/ML
INJECTION, SOLUTION INTRAVENOUS CONTINUOUS
OUTPATIENT
Start: 2023-08-10

## 2023-08-10 RX ORDER — DEXAMETHASONE SODIUM PHOSPHATE 10 MG/ML
10 INJECTION INTRAMUSCULAR; INTRAVENOUS ONCE
Status: COMPLETED | OUTPATIENT
Start: 2023-08-10 | End: 2023-08-10

## 2023-08-10 RX ORDER — FAMOTIDINE 10 MG/ML
20 INJECTION, SOLUTION INTRAVENOUS ONCE
Status: CANCELLED | OUTPATIENT
Start: 2023-08-10 | End: 2023-08-10

## 2023-08-10 RX ORDER — ACETAMINOPHEN 325 MG/1
650 TABLET ORAL
OUTPATIENT
Start: 2023-08-10

## 2023-08-10 RX ORDER — HEPARIN SODIUM (PORCINE) LOCK FLUSH IV SOLN 100 UNIT/ML 100 UNIT/ML
500 SOLUTION INTRAVENOUS PRN
OUTPATIENT
Start: 2023-08-10

## 2023-08-10 RX ORDER — DIPHENHYDRAMINE HYDROCHLORIDE 50 MG/ML
50 INJECTION INTRAMUSCULAR; INTRAVENOUS
Status: DISCONTINUED | OUTPATIENT
Start: 2023-08-10 | End: 2023-08-11 | Stop reason: HOSPADM

## 2023-08-10 RX ORDER — SODIUM CHLORIDE 9 MG/ML
5-250 INJECTION, SOLUTION INTRAVENOUS PRN
Status: CANCELLED | OUTPATIENT
Start: 2023-08-10

## 2023-08-10 RX ORDER — DIPHENHYDRAMINE HYDROCHLORIDE 50 MG/ML
50 INJECTION INTRAMUSCULAR; INTRAVENOUS
Status: CANCELLED | OUTPATIENT
Start: 2023-08-10

## 2023-08-10 RX ORDER — MEPERIDINE HYDROCHLORIDE 50 MG/ML
12.5 INJECTION INTRAMUSCULAR; INTRAVENOUS; SUBCUTANEOUS PRN
OUTPATIENT
Start: 2023-08-10

## 2023-08-10 RX ORDER — ONDANSETRON 2 MG/ML
8 INJECTION INTRAMUSCULAR; INTRAVENOUS
OUTPATIENT
Start: 2023-08-10

## 2023-08-10 RX ADMIN — POTASSIUM CHLORIDE 10 MEQ: 7.46 INJECTION, SOLUTION INTRAVENOUS at 12:06

## 2023-08-10 RX ADMIN — SODIUM CHLORIDE, PRESERVATIVE FREE 10 ML: 5 INJECTION INTRAVENOUS at 09:55

## 2023-08-10 RX ADMIN — DIPHENHYDRAMINE HYDROCHLORIDE 50 MG: 50 INJECTION, SOLUTION INTRAMUSCULAR; INTRAVENOUS at 10:07

## 2023-08-10 RX ADMIN — ONDANSETRON 8 MG: 2 INJECTION INTRAMUSCULAR; INTRAVENOUS at 10:00

## 2023-08-10 RX ADMIN — PACLITAXEL 108 MG: 6 INJECTION, SOLUTION, CONCENTRATE INTRAVENOUS at 10:45

## 2023-08-10 RX ADMIN — FAMOTIDINE 20 MG: 10 INJECTION INTRAVENOUS at 10:03

## 2023-08-10 RX ADMIN — SODIUM CHLORIDE, PRESERVATIVE FREE 10 ML: 5 INJECTION INTRAVENOUS at 13:20

## 2023-08-10 RX ADMIN — POTASSIUM CHLORIDE 10 MEQ: 7.46 INJECTION, SOLUTION INTRAVENOUS at 11:06

## 2023-08-10 RX ADMIN — SODIUM CHLORIDE 25 ML/HR: 9 INJECTION, SOLUTION INTRAVENOUS at 09:55

## 2023-08-10 RX ADMIN — MORPHINE SULFATE 2 MG: 2 INJECTION, SOLUTION INTRAMUSCULAR; INTRAVENOUS at 13:09

## 2023-08-10 RX ADMIN — DEXAMETHASONE SODIUM PHOSPHATE 10 MG: 10 INJECTION INTRAMUSCULAR; INTRAVENOUS at 10:09

## 2023-08-10 RX ADMIN — MORPHINE SULFATE 2 MG: 2 INJECTION, SOLUTION INTRAMUSCULAR; INTRAVENOUS at 10:05

## 2023-08-10 RX ADMIN — SODIUM CHLORIDE, PRESERVATIVE FREE 10 ML: 5 INJECTION INTRAVENOUS at 08:38

## 2023-08-10 ASSESSMENT — PAIN DESCRIPTION - ONSET: ONSET: ON-GOING

## 2023-08-10 ASSESSMENT — PAIN DESCRIPTION - LOCATION: LOCATION: ABDOMEN

## 2023-08-10 ASSESSMENT — ENCOUNTER SYMPTOMS
RESPIRATORY NEGATIVE: 1
ABDOMINAL PAIN: 1
VOMITING: 1
ALLERGIC/IMMUNOLOGIC NEGATIVE: 1
EYES NEGATIVE: 1
NAUSEA: 0

## 2023-08-10 ASSESSMENT — PAIN DESCRIPTION - FREQUENCY: FREQUENCY: CONTINUOUS

## 2023-08-10 ASSESSMENT — PATIENT HEALTH QUESTIONNAIRE - PHQ9
2. FEELING DOWN, DEPRESSED OR HOPELESS: 0
SUM OF ALL RESPONSES TO PHQ QUESTIONS 1-9: 0
SUM OF ALL RESPONSES TO PHQ QUESTIONS 1-9: 0
SUM OF ALL RESPONSES TO PHQ9 QUESTIONS 1 & 2: 0
SUM OF ALL RESPONSES TO PHQ QUESTIONS 1-9: 0
1. LITTLE INTEREST OR PLEASURE IN DOING THINGS: 0
SUM OF ALL RESPONSES TO PHQ QUESTIONS 1-9: 0

## 2023-08-10 ASSESSMENT — PAIN DESCRIPTION - DESCRIPTORS: DESCRIPTORS: ACHING

## 2023-08-10 ASSESSMENT — PAIN DESCRIPTION - PAIN TYPE: TYPE: CHRONIC PAIN

## 2023-08-10 ASSESSMENT — PAIN DESCRIPTION - ORIENTATION: ORIENTATION: RIGHT;LEFT;MID

## 2023-08-10 ASSESSMENT — PAIN - FUNCTIONAL ASSESSMENT: PAIN_FUNCTIONAL_ASSESSMENT: PREVENTS OR INTERFERES WITH ALL ACTIVE AND SOME PASSIVE ACTIVITIES

## 2023-08-10 ASSESSMENT — PAIN SCALES - GENERAL: PAINLEVEL_OUTOF10: 7

## 2023-08-10 NOTE — PROGRESS NOTES
Patient arrived to port lab for port access and lab draw   [address] accessed and labs drawn per protocol   *Port remains accessed.  Patient discharged from port lab ambulatory*

## 2023-08-10 NOTE — PROGRESS NOTES
Hematocrit 25.8 (L) 35.8 - 46.3 %    MCV 87.8 82.0 - 102.0 FL    MCH 25.9 (L) 26.1 - 32.9 PG    MCHC 29.5 (L) 31.4 - 35.0 g/dL    RDW 20.4 (H) 11.9 - 14.6 %    Platelets 049 878 - 217 K/uL    MPV 9.9 9.4 - 12.3 FL    nRBC 0.21 (H) 0.0 - 0.2 K/uL    Neutrophils % 75 43 - 78 %    Lymphocytes % 15 13 - 44 %    Monocytes % 8 4.0 - 12.0 %    Eosinophils % 0 (L) 0.5 - 7.8 %    Basophils % 0 0.0 - 2.0 %    Immature Granulocytes 2 0.0 - 5.0 %    Neutrophils Absolute 5.4 1.7 - 8.2 K/UL    Lymphocytes Absolute 1.1 0.5 - 4.6 K/UL    Monocytes Absolute 0.6 0.1 - 1.3 K/UL    Eosinophils Absolute 0.0 0.0 - 0.8 K/UL    Basophils Absolute 0.0 0.0 - 0.2 K/UL    Absolute Immature Granulocyte 0.1 0.0 - 0.5 K/UL    Differential Type AUTOMATED     Comprehensive Metabolic Panel    Collection Time: 08/10/23  8:34 AM   Result Value Ref Range    Sodium 141 133 - 143 mmol/L    Potassium 3.4 (L) 3.5 - 5.1 mmol/L    Chloride 105 101 - 110 mmol/L    CO2 29 21 - 32 mmol/L    Anion Gap 7 2 - 11 mmol/L    Glucose 99 65 - 100 mg/dL    BUN 24 (H) 6 - 23 MG/DL    Creatinine 0.40 (L) 0.6 - 1.0 MG/DL    Est, Glom Filt Rate >60 >60 ml/min/1.73m2    Calcium 8.4 8.3 - 10.4 MG/DL    Total Bilirubin 0.5 0.2 - 1.1 MG/DL    ALT 44 12 - 65 U/L    AST 29 15 - 37 U/L    Alk Phosphatase 193 (H) 50 - 136 U/L    Total Protein 5.7 (L) 6.3 - 8.2 g/dL    Albumin 1.8 (L) 3.5 - 5.0 g/dL    Globulin 3.9 2.8 - 4.5 g/dL    Albumin/Globulin Ratio 0.5 0.4 - 1.6     Magnesium    Collection Time: 08/10/23  8:34 AM   Result Value Ref Range    Magnesium 2.1 1.8 - 2.4 mg/dL         Imaging:  No results found for this or any previous visit. ASSESSMENT:   Diagnosis Orders   1. Cancer related pain        2. Heartburn        3. Encounter for palliative care        4. Neoplastic malignant related fatigue        5. Intractable nausea and vomiting        6.  Counseling regarding advanced care planning and goals of care            PLAN:  Lab studies and imaging studies were personally

## 2023-08-10 NOTE — PATIENT INSTRUCTIONS
Patient Instructions from Today's Visit    Reason for Visit:  Follow up Carcinoma    Diagnosis Information:  [email or web address] [email or web address]-us/[email or web address]-answers-patient-education-materials/uoti-zoamtxx-[email or web address]-sheets    Plan:  Lab results reviewed   Discussed hospitalization  Taxol today    Follow Up: Follow up 1 week    Recent Lab Results:  Hospital Outpatient Visit on 08/10/2023   Component Date Value Ref Range Status    WBC 08/10/2023 7.2  4.3 - 11.1 K/uL Final    RBC 08/10/2023 2.94 (L)  4.05 - 5.2 M/uL Final    Hemoglobin 08/10/2023 7.6 (L)  11.7 - 15.4 g/dL Final    Hematocrit 08/10/2023 25.8 (L)  35.8 - 46.3 % Final    MCV 08/10/2023 87.8  82.0 - 102.0 FL Final    MCH 08/10/2023 25.9 (L)  26.1 - 32.9 PG Final    MCHC 08/10/2023 29.5 (L)  31.4 - 35.0 g/dL Final    RDW 08/10/2023 20.4 (H)  11.9 - 14.6 % Final    Platelets 75/02/7695 329  150 - 450 K/uL Final    MPV 08/10/2023 9.9  9.4 - 12.3 FL Final    nRBC 08/10/2023 0.21 (H)  0.0 - 0.2 K/uL Final    **Note: Absolute NRBC parameter is now reported with Hemogram**    Neutrophils % 08/10/2023 75  43 - 78 % Final    Lymphocytes % 08/10/2023 15  13 - 44 % Final    Monocytes % 08/10/2023 8  4.0 - 12.0 % Final    Eosinophils % 08/10/2023 0 (L)  0.5 - 7.8 % Final    Basophils % 08/10/2023 0  0.0 - 2.0 % Final    Immature Granulocytes 08/10/2023 2  0.0 - 5.0 % Final    Neutrophils Absolute 08/10/2023 5.4  1.7 - 8.2 K/UL Final    Lymphocytes Absolute 08/10/2023 1.1  0.5 - 4.6 K/UL Final    Monocytes Absolute 08/10/2023 0.6  0.1 - 1.3 K/UL Final    Eosinophils Absolute 08/10/2023 0.0  0.0 - 0.8 K/UL Final    Basophils Absolute 08/10/2023 0.0  0.0 - 0.2 K/UL Final    Absolute Immature Granulocyte 08/10/2023 0.1  0.0 - 0.5 K/UL Final    Differential Type 08/10/2023 AUTOMATED    Final   No results displayed because visit has over 200 results.         Treatment Summary has been discussed and given to patient:

## 2023-08-10 NOTE — PROGRESS NOTES
Arrived to the [address] [place] [address]. Taxol, 2 doses IV Morphine, and 20mEq IV K+ completed as ordered. Patient tolerated well. Any issues or concerns during appointment: none. Patient aware of next infusion appointment on 8/17 (date) at 8:00 AM (time). Patient instructed to call provider with temperature of 100.4 or greater or nausea/vomiting/ diarrhea or pain not controlled by medications  Discharged via w/c.

## 2023-08-10 NOTE — CARE COORDINATION
[facility] Care Transitions Initial Follow Up Call    Call within 2 business days of discharge: Yes  Patient: Molly Blake Patient : 1975   MRN: [id number]  Reason for Admission: Generalized abdominal pain  Discharge Date: 23 RARS: Readmission Risk Score: 29.5  CTN attempted to contact patient for transitions of care outreach after hospital discharge on 23. Unable to reach at this time. Will try again later. Last Discharge [address]       Date Complaint Diagnosis Description Type Department Provider    23 Abdominal Pain Generalized abdominal pain . .. ED to Hosp-Admission (Discharged) (ADMITTED) Kip Carney MD; Tiago Soto. .. Was this an external facility discharge?  No Discharge Facility: [facility]  Follow Up  Future Appointments   Date Time Provider [address]   2023  7:00 AM LAB CK [facility]   2023  7:30 AM SCOTT Franco - NP [facility] AMB   2023  8:00 AM POD1B [address] [facility]   2023  7:00 AM INFUSION [facility]   2023  7:30 AM Leighann Camarena NP-COURTNEY [facility] AMB   2023  8:30 AM INFUSION [facility]   2023 11:30 AM INFUSION [facility]   2023 12:00 PM Margot Guerra MD [facility] AMB   2023 12:45 PM INFUSION [facility]   Lisa Tobias RN

## 2023-08-10 NOTE — PROGRESS NOTES
[facility] Hematology and Oncology: Office Visit Established Patient    Reason for follow up:    Neelima Mclain  is seen in follow-up for carcinoma of unknown primary. Overview: (copied from prior)  Ms. Froy Anderson was seen for the first time in our office in February 2022. She was a woman of previously good health who began noticing left-sided back discomfort in early January 2022. This was associated  ultimately with a sense of difficulty swallowing and ultimately she presented to [facility] for evaluation. Her symptoms were felt to potentially be indicative of a bowel obstruction and she was sent for a CT scan. This study, performed on January 27, 2022  was notable for a linear calcific density along the course of the proximal left ureter with associated moderate hydronephrosis potentially indicative of an intraureteral calculus. There was also stranding throughout the retroperitoneal soft tissues anterior  to the left psoas muscle with pelvic ascites. There was also wall thickening involving the descending colon. The question of colitis or serosal implants was raised. The patient had undergone a gastric sleeve placement several years prior. She had  also undergone a negative colonoscopy about 3 years prior to these presentations. There was a history of anemia ultimately resulting in the performance of a hysterectomy approximately 3 years ago for menorrhagia. Because of the CT scan findings, she  was ultimately referred to Dr. Qi Tenorio for evaluation of a possible pelvic malignancy. On February 1, 2022 he performed a laparoscopy and biopsy with evidence of peritoneal carcinomatosis grossly. A \"peritoneal nodule\" and a \"peritoneal implant\"  were both positive for a poorly differentiated metastatic carcinoma potentially consistent with an upper gastrointestinal primary. An omental biopsy showed no evidence of malignancy.   Immunohistochemistries were positive for cytokeratin 7 and negative  for

## 2023-08-11 ENCOUNTER — HOSPITAL ENCOUNTER (OUTPATIENT)
Dept: INFUSION THERAPY | Age: 48
Discharge: HOME | End: 2023-08-11
Payer: COMMERCIAL

## 2023-08-11 ENCOUNTER — CARE COORDINATION (OUTPATIENT)
Dept: CARE COORDINATION | Facility: CLINIC | Age: 48
End: 2023-08-11

## 2023-08-11 ENCOUNTER — TELEPHONE (OUTPATIENT)
Dept: GASTROENTEROLOGY | Age: 48
End: 2023-08-11

## 2023-08-11 VITALS
HEART RATE: 111 BPM | RESPIRATION RATE: 18 BRPM | DIASTOLIC BLOOD PRESSURE: 81 MMHG | SYSTOLIC BLOOD PRESSURE: 125 MMHG | TEMPERATURE: 98.3 F | OXYGEN SATURATION: 97 %

## 2023-08-11 DIAGNOSIS — E86.0 DEHYDRATION: ICD-10-CM

## 2023-08-11 DIAGNOSIS — C78.6 METASTASIS TO PERITONEUM OF UNKNOWN PRIMARY (HCC): ICD-10-CM

## 2023-08-11 DIAGNOSIS — C80.1 METASTASIS TO PERITONEUM OF UNKNOWN PRIMARY (HCC): ICD-10-CM

## 2023-08-11 DIAGNOSIS — R11.2 CHEMOTHERAPY INDUCED NAUSEA AND VOMITING: Primary | ICD-10-CM

## 2023-08-11 DIAGNOSIS — T45.1X5A CHEMOTHERAPY INDUCED NAUSEA AND VOMITING: Primary | ICD-10-CM

## 2023-08-11 PROCEDURE — 96374 THER/PROPH/DIAG INJ IV PUSH: CPT

## 2023-08-11 PROCEDURE — 96375 TX/PRO/DX INJ NEW DRUG ADDON: CPT

## 2023-08-11 PROCEDURE — 2580000003 HC RX 258: Performed by: NURSE PRACTITIONER

## 2023-08-11 PROCEDURE — 6360000002 HC RX W HCPCS: Performed by: NURSE PRACTITIONER

## 2023-08-11 RX ORDER — MORPHINE SULFATE 2 MG/ML
2 INJECTION, SOLUTION INTRAMUSCULAR; INTRAVENOUS ONCE
Status: COMPLETED | OUTPATIENT
Start: 2023-08-11 | End: 2023-08-11

## 2023-08-11 RX ORDER — SODIUM CHLORIDE 0.9 % (FLUSH) 0.9 %
5-40 SYRINGE (ML) INJECTION PRN
Status: DISCONTINUED | OUTPATIENT
Start: 2023-08-11 | End: 2023-08-12 | Stop reason: HOSPADM

## 2023-08-11 RX ORDER — HEPARIN 100 UNIT/ML
500 SYRINGE INTRAVENOUS PRN
OUTPATIENT
Start: 2023-08-11

## 2023-08-11 RX ORDER — MORPHINE SULFATE 2 MG/ML
2 INJECTION, SOLUTION INTRAMUSCULAR; INTRAVENOUS ONCE
Status: CANCELLED
Start: 2023-08-11 | End: 2023-08-11

## 2023-08-11 RX ORDER — HEPARIN SODIUM (PORCINE) LOCK FLUSH IV SOLN 100 UNIT/ML 100 UNIT/ML
500 SOLUTION INTRAVENOUS PRN
OUTPATIENT
Start: 2023-08-11

## 2023-08-11 RX ORDER — SODIUM CHLORIDE 0.9 % (FLUSH) 0.9 %
5-40 SYRINGE (ML) INJECTION PRN
Status: CANCELLED | OUTPATIENT
Start: 2023-08-11

## 2023-08-11 RX ORDER — ONDANSETRON HYDROCHLORIDE 4 MG/5ML
4 SOLUTION ORAL ONCE
Status: CANCELLED
Start: 2023-08-11 | End: 2023-08-11

## 2023-08-11 RX ORDER — SODIUM CHLORIDE 0.9 % (FLUSH) 0.9 %
5-40 SYRINGE (ML) INJECTION PRN
OUTPATIENT
Start: 2023-08-11

## 2023-08-11 RX ORDER — SODIUM CHLORIDE 9 MG/ML
5-250 INJECTION, SOLUTION INTRAVENOUS PRN
OUTPATIENT
Start: 2023-08-11

## 2023-08-11 RX ORDER — ONDANSETRON 2 MG/ML
4 INJECTION INTRAMUSCULAR; INTRAVENOUS ONCE
Status: COMPLETED | OUTPATIENT
Start: 2023-08-11 | End: 2023-08-11

## 2023-08-11 RX ADMIN — ONDANSETRON 4 MG: 2 INJECTION INTRAMUSCULAR; INTRAVENOUS at 16:31

## 2023-08-11 RX ADMIN — MORPHINE SULFATE 2 MG: 2 INJECTION, SOLUTION INTRAMUSCULAR; INTRAVENOUS at 16:33

## 2023-08-11 RX ADMIN — SODIUM CHLORIDE, PRESERVATIVE FREE 10 ML: 5 INJECTION INTRAVENOUS at 16:37

## 2023-08-11 ASSESSMENT — PAIN DESCRIPTION - ONSET: ONSET: ON-GOING

## 2023-08-11 ASSESSMENT — PAIN DESCRIPTION - PAIN TYPE: TYPE: CHRONIC PAIN

## 2023-08-11 ASSESSMENT — PAIN SCALES - GENERAL: PAINLEVEL_OUTOF10: 8

## 2023-08-11 ASSESSMENT — PAIN DESCRIPTION - ORIENTATION: ORIENTATION: RIGHT;LEFT;MID

## 2023-08-11 ASSESSMENT — PAIN DESCRIPTION - FREQUENCY: FREQUENCY: CONTINUOUS

## 2023-08-11 ASSESSMENT — PAIN - FUNCTIONAL ASSESSMENT: PAIN_FUNCTIONAL_ASSESSMENT: PREVENTS OR INTERFERES SOME ACTIVE ACTIVITIES AND ADLS

## 2023-08-11 ASSESSMENT — PAIN DESCRIPTION - LOCATION: LOCATION: ABDOMEN

## 2023-08-11 ASSESSMENT — PAIN DESCRIPTION - DESCRIPTORS: DESCRIPTORS: ACHING

## 2023-08-11 NOTE — TELEPHONE ENCOUNTER
I called pt to schedule and EGD for 8 to 12 weeks , patient states that she is going to have Dr. Zamzam Parmar from [facility] to do this procedure

## 2023-08-11 NOTE — CARE COORDINATION
[facility] Care Transitions Initial Follow Up Call    Call within 2 business days of discharge: Yes    Patient: Tana Schmidt Patient : 1975   MRN: [id number]  Reason for Admission: Generalized abdominal pain  Discharge Date: 23 RARS: Readmission Risk Score: 29.5  Multiple unsuccessful attempts to outreach for transitions of care. Unable to reach patient. Letter sent. Episode closed at this time. Last Discharge [address]       Date Complaint Diagnosis Description Type Department Provider    23 Abdominal Pain Generalized abdominal pain . .. ED to Hosp-Admission (Discharged) (ADMITTED) Tania Hansen MD; Vijya Kirkpatrick. .. Was this an external facility discharge?  No Discharge Facility: [facility]    Follow Up  Future Appointments   Date Time Provider [address] [place] [address]   2023  7:00 AM LAB CK [facility]   2023  7:30 AM SCOTT Blanco - NP [facility] AMB   2023  8:00 AM POD1B [facility]   2023  7:00 AM PORT [facility]   2023  7:30 AM BOB Matthew [facility] AMB   2023  8:30 AM INFUSION [facility]   2023  9:00 AM SCOTT Sanderson [facility] AMB   2023 11:30 AM PORT [facility]   2023 12:00 PM Marci Spence MD [facility]   2023 12:45 PM INFUSION [facility]     Perla Russell RN

## 2023-08-11 NOTE — PROGRESS NOTES
Pt arrived via wheelchair, did not want IVF, morphine and zofran given IV, pt tolerated well, discharged home via wheelchair

## 2023-08-14 ENCOUNTER — APPOINTMENT (OUTPATIENT)
Dept: CT IMAGING | Age: 48
DRG: 377 | End: 2023-08-14
Payer: COMMERCIAL

## 2023-08-14 ENCOUNTER — HOSPITAL ENCOUNTER (INPATIENT)
Age: 48
LOS: 8 days | Discharge: HOME HEALTH | DRG: 377 | End: 2023-08-22
Attending: STUDENT IN AN ORGANIZED HEALTH CARE EDUCATION/TRAINING PROGRAM | Admitting: FAMILY MEDICINE
Payer: COMMERCIAL

## 2023-08-14 DIAGNOSIS — G89.3 CANCER RELATED PAIN: ICD-10-CM

## 2023-08-14 DIAGNOSIS — J90 BILATERAL PLEURAL EFFUSION: ICD-10-CM

## 2023-08-14 DIAGNOSIS — R18.8 OTHER ASCITES: ICD-10-CM

## 2023-08-14 DIAGNOSIS — D64.9 ANEMIA, UNSPECIFIED TYPE: ICD-10-CM

## 2023-08-14 DIAGNOSIS — K92.0 HEMATEMESIS WITH NAUSEA: Primary | ICD-10-CM

## 2023-08-14 LAB
ALBUMIN SERPL-MCNC: 2 G/DL (ref 3.5–5)
ALBUMIN/GLOB SERPL: 0.5 (ref 0.4–1.6)
ALP SERPL-CCNC: 183 U/L (ref 50–136)
ALT SERPL-CCNC: 55 U/L (ref 12–65)
ANION GAP SERPL CALC-SCNC: 7 MMOL/L (ref 2–11)
APPEARANCE UR: CLEAR
AST SERPL-CCNC: 42 U/L (ref 15–37)
BACTERIA URNS QL MICRO: ABNORMAL /HPF
BASOPHILS # BLD: 0 K/UL (ref 0–0.2)
BASOPHILS NFR BLD: 0 % (ref 0–2)
BILIRUB SERPL-MCNC: 0.5 MG/DL (ref 0.2–1.1)
BILIRUB UR QL: NEGATIVE
BUN SERPL-MCNC: 18 MG/DL (ref 6–23)
CALCIUM SERPL-MCNC: 9 MG/DL (ref 8.3–10.4)
CHLORIDE SERPL-SCNC: 103 MMOL/L (ref 101–110)
CO2 SERPL-SCNC: 31 MMOL/L (ref 21–32)
COLOR UR: ABNORMAL
CREAT SERPL-MCNC: 0.4 MG/DL (ref 0.6–1)
CRYSTALS URNS QL MICRO: ABNORMAL /LPF
DIFFERENTIAL METHOD BLD: ABNORMAL
EKG ATRIAL RATE: 127 BPM
EKG DIAGNOSIS: NORMAL
EKG P AXIS: 47 DEGREES
EKG P-R INTERVAL: 144 MS
EKG Q-T INTERVAL: 298 MS
EKG QRS DURATION: 62 MS
EKG QTC CALCULATION (BAZETT): 433 MS
EKG R AXIS: 32 DEGREES
EKG T AXIS: -14 DEGREES
EKG VENTRICULAR RATE: 127 BPM
EOSINOPHIL # BLD: 0 K/UL (ref 0–0.8)
EOSINOPHIL NFR BLD: 0 % (ref 0.5–7.8)
EPI CELLS #/AREA URNS HPF: ABNORMAL /HPF
ERYTHROCYTE [DISTWIDTH] IN BLOOD BY AUTOMATED COUNT: 21.2 % (ref 11.9–14.6)
GLOBULIN SER CALC-MCNC: 4.1 G/DL (ref 2.8–4.5)
GLUCOSE SERPL-MCNC: 124 MG/DL (ref 65–100)
GLUCOSE UR STRIP.AUTO-MCNC: NEGATIVE MG/DL
HCT VFR BLD AUTO: 26 % (ref 35.8–46.3)
HGB BLD-MCNC: 7.5 G/DL (ref 11.7–15.4)
HGB UR QL STRIP: NEGATIVE
IMM GRANULOCYTES # BLD AUTO: 0.1 K/UL (ref 0–0.5)
IMM GRANULOCYTES NFR BLD AUTO: 1 % (ref 0–5)
KETONES UR QL STRIP.AUTO: NEGATIVE MG/DL
LACTATE SERPL-SCNC: 1.9 MMOL/L (ref 0.4–2)
LEUKOCYTE ESTERASE UR QL STRIP.AUTO: ABNORMAL
LIPASE SERPL-CCNC: 77 U/L (ref 73–393)
LYMPHOCYTES # BLD: 0.9 K/UL (ref 0.5–4.6)
LYMPHOCYTES NFR BLD: 18 % (ref 13–44)
MAGNESIUM SERPL-MCNC: 2.3 MG/DL (ref 1.8–2.4)
MCH RBC QN AUTO: 25.8 PG (ref 26.1–32.9)
MCHC RBC AUTO-ENTMCNC: 28.8 G/DL (ref 31.4–35)
MCV RBC AUTO: 89.3 FL (ref 82–102)
MONOCYTES # BLD: 0.3 K/UL (ref 0.1–1.3)
MONOCYTES NFR BLD: 5 % (ref 4–12)
NEUTS SEG # BLD: 3.8 K/UL (ref 1.7–8.2)
NEUTS SEG NFR BLD: 75 % (ref 43–78)
NITRITE UR QL STRIP.AUTO: NEGATIVE
NRBC # BLD: 0.06 K/UL (ref 0–0.2)
OTHER OBSERVATIONS: ABNORMAL
PH UR STRIP: 8 (ref 5–9)
PLATELET # BLD AUTO: 460 K/UL (ref 150–450)
PMV BLD AUTO: 11.3 FL (ref 9.4–12.3)
POTASSIUM SERPL-SCNC: 3.4 MMOL/L (ref 3.5–5.1)
PROCALCITONIN SERPL-MCNC: 0.06 NG/ML (ref 0–0.49)
PROT SERPL-MCNC: 6.1 G/DL (ref 6.3–8.2)
PROT UR STRIP-MCNC: 30 MG/DL
RBC # BLD AUTO: 2.91 M/UL (ref 4.05–5.2)
SODIUM SERPL-SCNC: 141 MMOL/L (ref 133–143)
SP GR UR REFRACTOMETRY: 1.03 (ref 1–1.02)
TROPONIN I SERPL HS-MCNC: 5 PG/ML (ref 0–14)
UROBILINOGEN UR QL STRIP.AUTO: 1 EU/DL (ref 0.2–1)
WBC # BLD AUTO: 5.1 K/UL (ref 4.3–11.1)
WBC URNS QL MICRO: ABNORMAL /HPF

## 2023-08-14 PROCEDURE — 2140000001 HC CVICU INTERMEDIATE R&B

## 2023-08-14 PROCEDURE — C9113 INJ PANTOPRAZOLE SODIUM, VIA: HCPCS | Performed by: FAMILY MEDICINE

## 2023-08-14 PROCEDURE — A4216 STERILE WATER/SALINE, 10 ML: HCPCS | Performed by: STUDENT IN AN ORGANIZED HEALTH CARE EDUCATION/TRAINING PROGRAM

## 2023-08-14 PROCEDURE — 85025 COMPLETE CBC W/AUTO DIFF WBC: CPT

## 2023-08-14 PROCEDURE — 96375 TX/PRO/DX INJ NEW DRUG ADDON: CPT

## 2023-08-14 PROCEDURE — 2580000003 HC RX 258: Performed by: FAMILY MEDICINE

## 2023-08-14 PROCEDURE — 6360000002 HC RX W HCPCS: Performed by: FAMILY MEDICINE

## 2023-08-14 PROCEDURE — 86923 COMPATIBILITY TEST ELECTRIC: CPT

## 2023-08-14 PROCEDURE — 81001 URINALYSIS AUTO W/SCOPE: CPT

## 2023-08-14 PROCEDURE — 84484 ASSAY OF TROPONIN QUANT: CPT

## 2023-08-14 PROCEDURE — 6360000002 HC RX W HCPCS: Performed by: STUDENT IN AN ORGANIZED HEALTH CARE EDUCATION/TRAINING PROGRAM

## 2023-08-14 PROCEDURE — 86850 RBC ANTIBODY SCREEN: CPT

## 2023-08-14 PROCEDURE — 93005 ELECTROCARDIOGRAM TRACING: CPT | Performed by: STUDENT IN AN ORGANIZED HEALTH CARE EDUCATION/TRAINING PROGRAM

## 2023-08-14 PROCEDURE — 80053 COMPREHEN METABOLIC PANEL: CPT

## 2023-08-14 PROCEDURE — 96361 HYDRATE IV INFUSION ADD-ON: CPT

## 2023-08-14 PROCEDURE — 6360000004 HC RX CONTRAST MEDICATION: Performed by: STUDENT IN AN ORGANIZED HEALTH CARE EDUCATION/TRAINING PROGRAM

## 2023-08-14 PROCEDURE — 2580000003 HC RX 258: Performed by: STUDENT IN AN ORGANIZED HEALTH CARE EDUCATION/TRAINING PROGRAM

## 2023-08-14 PROCEDURE — 2500000003 HC RX 250 WO HCPCS: Performed by: FAMILY MEDICINE

## 2023-08-14 PROCEDURE — 93010 ELECTROCARDIOGRAM REPORT: CPT | Performed by: INTERNAL MEDICINE

## 2023-08-14 PROCEDURE — 86900 BLOOD TYPING SEROLOGIC ABO: CPT

## 2023-08-14 PROCEDURE — 71260 CT THORAX DX C+: CPT

## 2023-08-14 PROCEDURE — 96374 THER/PROPH/DIAG INJ IV PUSH: CPT

## 2023-08-14 PROCEDURE — 83605 ASSAY OF LACTIC ACID: CPT

## 2023-08-14 PROCEDURE — 2500000003 HC RX 250 WO HCPCS: Performed by: STUDENT IN AN ORGANIZED HEALTH CARE EDUCATION/TRAINING PROGRAM

## 2023-08-14 PROCEDURE — C9113 INJ PANTOPRAZOLE SODIUM, VIA: HCPCS | Performed by: STUDENT IN AN ORGANIZED HEALTH CARE EDUCATION/TRAINING PROGRAM

## 2023-08-14 PROCEDURE — 99285 EMERGENCY DEPT VISIT HI MDM: CPT

## 2023-08-14 PROCEDURE — 86901 BLOOD TYPING SEROLOGIC RH(D): CPT

## 2023-08-14 PROCEDURE — 84145 PROCALCITONIN (PCT): CPT

## 2023-08-14 PROCEDURE — 83690 ASSAY OF LIPASE: CPT

## 2023-08-14 PROCEDURE — 83735 ASSAY OF MAGNESIUM: CPT

## 2023-08-14 PROCEDURE — A4216 STERILE WATER/SALINE, 10 ML: HCPCS | Performed by: FAMILY MEDICINE

## 2023-08-14 RX ORDER — ACETAMINOPHEN 325 MG/1
650 TABLET ORAL EVERY 6 HOURS PRN
Status: DISCONTINUED | OUTPATIENT
Start: 2023-08-14 | End: 2023-08-22 | Stop reason: HOSPADM

## 2023-08-14 RX ORDER — ONDANSETRON 2 MG/ML
4 INJECTION INTRAMUSCULAR; INTRAVENOUS EVERY 6 HOURS PRN
Status: DISCONTINUED | OUTPATIENT
Start: 2023-08-14 | End: 2023-08-22 | Stop reason: HOSPADM

## 2023-08-14 RX ORDER — ONDANSETRON 4 MG/1
4 TABLET, ORALLY DISINTEGRATING ORAL EVERY 8 HOURS PRN
Status: DISCONTINUED | OUTPATIENT
Start: 2023-08-14 | End: 2023-08-22 | Stop reason: HOSPADM

## 2023-08-14 RX ORDER — FUROSEMIDE 10 MG/ML
20 INJECTION INTRAMUSCULAR; INTRAVENOUS ONCE
Status: COMPLETED | OUTPATIENT
Start: 2023-08-14 | End: 2023-08-14

## 2023-08-14 RX ORDER — 0.9 % SODIUM CHLORIDE 0.9 %
1000 INTRAVENOUS SOLUTION INTRAVENOUS
Status: COMPLETED | OUTPATIENT
Start: 2023-08-14 | End: 2023-08-14

## 2023-08-14 RX ORDER — ACETAMINOPHEN 650 MG/1
650 SUPPOSITORY RECTAL EVERY 6 HOURS PRN
Status: DISCONTINUED | OUTPATIENT
Start: 2023-08-14 | End: 2023-08-22 | Stop reason: HOSPADM

## 2023-08-14 RX ORDER — ONDANSETRON 2 MG/ML
4 INJECTION INTRAMUSCULAR; INTRAVENOUS
Status: COMPLETED | OUTPATIENT
Start: 2023-08-14 | End: 2023-08-14

## 2023-08-14 RX ORDER — SODIUM CHLORIDE 9 MG/ML
INJECTION, SOLUTION INTRAVENOUS PRN
Status: DISCONTINUED | OUTPATIENT
Start: 2023-08-14 | End: 2023-08-22 | Stop reason: HOSPADM

## 2023-08-14 RX ORDER — POTASSIUM CHLORIDE 7.45 MG/ML
10 INJECTION INTRAVENOUS
Status: COMPLETED | OUTPATIENT
Start: 2023-08-14 | End: 2023-08-14

## 2023-08-14 RX ORDER — POLYETHYLENE GLYCOL 3350 17 G/17G
17 POWDER, FOR SOLUTION ORAL DAILY PRN
Status: DISCONTINUED | OUTPATIENT
Start: 2023-08-14 | End: 2023-08-22 | Stop reason: HOSPADM

## 2023-08-14 RX ORDER — SODIUM CHLORIDE 0.9 % (FLUSH) 0.9 %
5-40 SYRINGE (ML) INJECTION EVERY 12 HOURS SCHEDULED
Status: DISCONTINUED | OUTPATIENT
Start: 2023-08-14 | End: 2023-08-22 | Stop reason: HOSPADM

## 2023-08-14 RX ORDER — SODIUM CHLORIDE 0.9 % (FLUSH) 0.9 %
5-40 SYRINGE (ML) INJECTION PRN
Status: DISCONTINUED | OUTPATIENT
Start: 2023-08-14 | End: 2023-08-22 | Stop reason: HOSPADM

## 2023-08-14 RX ORDER — HYDROMORPHONE HYDROCHLORIDE 1 MG/ML
1 INJECTION, SOLUTION INTRAMUSCULAR; INTRAVENOUS; SUBCUTANEOUS
Status: COMPLETED | OUTPATIENT
Start: 2023-08-14 | End: 2023-08-14

## 2023-08-14 RX ORDER — MORPHINE SULFATE 2 MG/ML
1 INJECTION, SOLUTION INTRAMUSCULAR; INTRAVENOUS EVERY 4 HOURS PRN
Status: DISCONTINUED | OUTPATIENT
Start: 2023-08-14 | End: 2023-08-15

## 2023-08-14 RX ADMIN — ONDANSETRON 4 MG: 2 INJECTION INTRAMUSCULAR; INTRAVENOUS at 22:43

## 2023-08-14 RX ADMIN — ONDANSETRON 4 MG: 2 INJECTION INTRAMUSCULAR; INTRAVENOUS at 16:36

## 2023-08-14 RX ADMIN — SODIUM CHLORIDE, PRESERVATIVE FREE 10 ML: 5 INJECTION INTRAVENOUS at 22:44

## 2023-08-14 RX ADMIN — SODIUM CHLORIDE, PRESERVATIVE FREE 40 MG: 5 INJECTION INTRAVENOUS at 22:04

## 2023-08-14 RX ADMIN — MORPHINE SULFATE 1 MG: 2 INJECTION, SOLUTION INTRAMUSCULAR; INTRAVENOUS at 22:43

## 2023-08-14 RX ADMIN — HYDROMORPHONE HYDROCHLORIDE 1 MG: 1 INJECTION, SOLUTION INTRAMUSCULAR; INTRAVENOUS; SUBCUTANEOUS at 16:37

## 2023-08-14 RX ADMIN — TUBERCULIN PURIFIED PROTEIN DERIVATIVE 5 UNITS: 5 INJECTION, SOLUTION INTRADERMAL at 22:50

## 2023-08-14 RX ADMIN — POTASSIUM CHLORIDE 10 MEQ: 7.46 INJECTION, SOLUTION INTRAVENOUS at 22:44

## 2023-08-14 RX ADMIN — SODIUM CHLORIDE 1000 ML: 9 INJECTION, SOLUTION INTRAVENOUS at 16:35

## 2023-08-14 RX ADMIN — FUROSEMIDE 20 MG: 10 INJECTION, SOLUTION INTRAMUSCULAR; INTRAVENOUS at 22:04

## 2023-08-14 RX ADMIN — POTASSIUM CHLORIDE 10 MEQ: 7.46 INJECTION, SOLUTION INTRAVENOUS at 23:45

## 2023-08-14 RX ADMIN — SODIUM CHLORIDE 80 MG: 9 INJECTION INTRAMUSCULAR; INTRAVENOUS; SUBCUTANEOUS at 16:35

## 2023-08-14 RX ADMIN — IOPAMIDOL 100 ML: 755 INJECTION, SOLUTION INTRAVENOUS at 18:35

## 2023-08-14 ASSESSMENT — PAIN DESCRIPTION - DESCRIPTORS: DESCRIPTORS: ACHING;DISCOMFORT;SHOOTING

## 2023-08-14 ASSESSMENT — PAIN DESCRIPTION - LOCATION
LOCATION: ABDOMEN
LOCATION: CHEST;ABDOMEN

## 2023-08-14 ASSESSMENT — LIFESTYLE VARIABLES
HOW OFTEN DO YOU HAVE A DRINK CONTAINING ALCOHOL: NEVER
HOW MANY STANDARD DRINKS CONTAINING ALCOHOL DO YOU HAVE ON A TYPICAL DAY: PATIENT DOES NOT DRINK

## 2023-08-14 ASSESSMENT — PAIN DESCRIPTION - FREQUENCY: FREQUENCY: CONTINUOUS

## 2023-08-14 ASSESSMENT — PAIN - FUNCTIONAL ASSESSMENT: PAIN_FUNCTIONAL_ASSESSMENT: 0-10

## 2023-08-14 ASSESSMENT — PAIN SCALES - GENERAL
PAINLEVEL_OUTOF10: 8
PAINLEVEL_OUTOF10: 7

## 2023-08-14 ASSESSMENT — PAIN DESCRIPTION - PAIN TYPE: TYPE: ACUTE PAIN

## 2023-08-14 NOTE — ED TRIAGE NOTES
Pt reports emesis xweeks, chest and abdominal pain started today (+)emesis, nausea (-)diarrhea, fevers (+)hematemesis   Pt receives chemo for colon cancer  A&Ox4

## 2023-08-14 NOTE — ED PROVIDER NOTES
Emergency Department Provider Note       PCP: None None   Age: 50 y.o. Sex: female     DISPOSITION Decision To Admit 08/14/2023 07:55:43 PM       ICD-10-CM    1. Hematemesis with nausea  K92.0       2. Bilateral pleural effusion  J90       3. Anemia, unspecified type  D64.9       4. Other ascites  R18.8           Medical Decision Making     Complexity of Problems Addressed:  1 or more chronic illnesses with a severe exacerbation or progression. 1 or more acute illnesses that pose a threat to life or bodily function. Data Reviewed and Analyzed:  I independently ordered and reviewed each unique test.  I reviewed external records: ED visit note from an outside group. I reviewed external records: provider visit note from PCP. I reviewed external records: provider visit note from outside specialist.   The patients assessment required an independent historian: Family at bedside. The reason they were needed is important historical information not provided by the patient. I interpreted the CT Scan no obvious obstruction, bilateral pleural effusions present. Discussion of management or test interpretation. 45-year-old male patient presenting with nausea, vomiting and inability keep down food or fluids or medications for several days. She reports bloody appearing emesis at home which have been ongoing for several days as well. She reports generalized abdominal pain and distention. History of malignant ascites for which she has a peritoneal drain in place. Patient's anemic on today's labs which significant change from labs collected earlier this week. She has dark appearing emesis in her emesis bag on arrival consistent with hematemesis. She is not anticoagulated. She has received fluids and antiemetics in this department. CT imaging shows no evidence of obstruction though findings of new bilateral pleural effusions present. No evidence of free intraperitoneal air or esophageal injury.     We

## 2023-08-15 ENCOUNTER — ANESTHESIA (OUTPATIENT)
Dept: ENDOSCOPY | Age: 48
End: 2023-08-15
Payer: COMMERCIAL

## 2023-08-15 ENCOUNTER — ANESTHESIA EVENT (OUTPATIENT)
Dept: ENDOSCOPY | Age: 48
End: 2023-08-15
Payer: COMMERCIAL

## 2023-08-15 PROBLEM — J90 BILATERAL PLEURAL EFFUSION: Status: ACTIVE | Noted: 2023-08-15

## 2023-08-15 PROBLEM — R18.8 PERITONEAL EFFUSION: Status: ACTIVE | Noted: 2023-08-15

## 2023-08-15 LAB
ANION GAP SERPL CALC-SCNC: 4 MMOL/L (ref 2–11)
BUN SERPL-MCNC: 16 MG/DL (ref 6–23)
CALCIUM SERPL-MCNC: 7.8 MG/DL (ref 8.3–10.4)
CHLORIDE SERPL-SCNC: 109 MMOL/L (ref 101–110)
CO2 SERPL-SCNC: 31 MMOL/L (ref 21–32)
CREAT SERPL-MCNC: 0.4 MG/DL (ref 0.6–1)
GLUCOSE SERPL-MCNC: 92 MG/DL (ref 65–100)
HCT VFR BLD AUTO: 20.9 % (ref 35.8–46.3)
HCT VFR BLD AUTO: 23.2 % (ref 35.8–46.3)
HCT VFR BLD AUTO: 26.1 % (ref 35.8–46.3)
HCT VFR BLD AUTO: 26.6 % (ref 35.8–46.3)
HGB BLD-MCNC: 6.1 G/DL (ref 11.7–15.4)
HGB BLD-MCNC: 7 G/DL (ref 11.7–15.4)
HGB BLD-MCNC: 8 G/DL (ref 11.7–15.4)
HGB BLD-MCNC: 8.2 G/DL (ref 11.7–15.4)
HISTORY CHECK: NORMAL
MAGNESIUM SERPL-MCNC: 2 MG/DL (ref 1.8–2.4)
MM INDURATION, POC: 0 MM (ref 0–5)
PHOSPHATE SERPL-MCNC: 3.8 MG/DL (ref 2.5–4.5)
POTASSIUM SERPL-SCNC: 4 MMOL/L (ref 3.5–5.1)
PPD, POC: NEGATIVE
SODIUM SERPL-SCNC: 144 MMOL/L (ref 133–143)

## 2023-08-15 PROCEDURE — 3700000000 HC ANESTHESIA ATTENDED CARE: Performed by: INTERNAL MEDICINE

## 2023-08-15 PROCEDURE — 2580000003 HC RX 258: Performed by: NURSE ANESTHETIST, CERTIFIED REGISTERED

## 2023-08-15 PROCEDURE — 2580000003 HC RX 258: Performed by: INTERNAL MEDICINE

## 2023-08-15 PROCEDURE — 7100000011 HC PHASE II RECOVERY - ADDTL 15 MIN: Performed by: INTERNAL MEDICINE

## 2023-08-15 PROCEDURE — 6370000000 HC RX 637 (ALT 250 FOR IP): Performed by: NURSE PRACTITIONER

## 2023-08-15 PROCEDURE — 80048 BASIC METABOLIC PNL TOTAL CA: CPT

## 2023-08-15 PROCEDURE — 83735 ASSAY OF MAGNESIUM: CPT

## 2023-08-15 PROCEDURE — 97535 SELF CARE MNGMENT TRAINING: CPT

## 2023-08-15 PROCEDURE — 97165 OT EVAL LOW COMPLEX 30 MIN: CPT

## 2023-08-15 PROCEDURE — 2500000003 HC RX 250 WO HCPCS: Performed by: NURSE ANESTHETIST, CERTIFIED REGISTERED

## 2023-08-15 PROCEDURE — 2500000003 HC RX 250 WO HCPCS: Performed by: NURSE PRACTITIONER

## 2023-08-15 PROCEDURE — 2140000001 HC CVICU INTERMEDIATE R&B

## 2023-08-15 PROCEDURE — 6360000002 HC RX W HCPCS: Performed by: FAMILY MEDICINE

## 2023-08-15 PROCEDURE — 86644 CMV ANTIBODY: CPT

## 2023-08-15 PROCEDURE — 6360000002 HC RX W HCPCS: Performed by: NURSE ANESTHETIST, CERTIFIED REGISTERED

## 2023-08-15 PROCEDURE — 3609017100 HC EGD: Performed by: INTERNAL MEDICINE

## 2023-08-15 PROCEDURE — 30233N1 TRANSFUSION OF NONAUTOLOGOUS RED BLOOD CELLS INTO PERIPHERAL VEIN, PERCUTANEOUS APPROACH: ICD-10-PCS | Performed by: FAMILY MEDICINE

## 2023-08-15 PROCEDURE — 97161 PT EVAL LOW COMPLEX 20 MIN: CPT

## 2023-08-15 PROCEDURE — 6360000002 HC RX W HCPCS: Performed by: STUDENT IN AN ORGANIZED HEALTH CARE EDUCATION/TRAINING PROGRAM

## 2023-08-15 PROCEDURE — P9040 RBC LEUKOREDUCED IRRADIATED: HCPCS

## 2023-08-15 PROCEDURE — A4216 STERILE WATER/SALINE, 10 ML: HCPCS | Performed by: FAMILY MEDICINE

## 2023-08-15 PROCEDURE — C9113 INJ PANTOPRAZOLE SODIUM, VIA: HCPCS | Performed by: FAMILY MEDICINE

## 2023-08-15 PROCEDURE — 99222 1ST HOSP IP/OBS MODERATE 55: CPT | Performed by: INTERNAL MEDICINE

## 2023-08-15 PROCEDURE — 0DJ08ZZ INSPECTION OF UPPER INTESTINAL TRACT, VIA NATURAL OR ARTIFICIAL OPENING ENDOSCOPIC: ICD-10-PCS | Performed by: INTERNAL MEDICINE

## 2023-08-15 PROCEDURE — 36430 TRANSFUSION BLD/BLD COMPNT: CPT

## 2023-08-15 PROCEDURE — 85014 HEMATOCRIT: CPT

## 2023-08-15 PROCEDURE — 36415 COLL VENOUS BLD VENIPUNCTURE: CPT

## 2023-08-15 PROCEDURE — 2709999900 HC NON-CHARGEABLE SUPPLY: Performed by: INTERNAL MEDICINE

## 2023-08-15 PROCEDURE — 97530 THERAPEUTIC ACTIVITIES: CPT

## 2023-08-15 PROCEDURE — 2580000003 HC RX 258: Performed by: FAMILY MEDICINE

## 2023-08-15 PROCEDURE — 2500000003 HC RX 250 WO HCPCS: Performed by: INTERNAL MEDICINE

## 2023-08-15 PROCEDURE — 6360000002 HC RX W HCPCS: Performed by: INTERNAL MEDICINE

## 2023-08-15 PROCEDURE — 7100000010 HC PHASE II RECOVERY - FIRST 15 MIN: Performed by: INTERNAL MEDICINE

## 2023-08-15 PROCEDURE — 85018 HEMOGLOBIN: CPT

## 2023-08-15 PROCEDURE — 84100 ASSAY OF PHOSPHORUS: CPT

## 2023-08-15 RX ORDER — SODIUM CHLORIDE, SODIUM LACTATE, POTASSIUM CHLORIDE, CALCIUM CHLORIDE 600; 310; 30; 20 MG/100ML; MG/100ML; MG/100ML; MG/100ML
INJECTION, SOLUTION INTRAVENOUS CONTINUOUS
Status: CANCELLED | OUTPATIENT
Start: 2023-08-15

## 2023-08-15 RX ORDER — FUROSEMIDE 10 MG/ML
20 INJECTION INTRAMUSCULAR; INTRAVENOUS DAILY
Status: DISCONTINUED | OUTPATIENT
Start: 2023-08-16 | End: 2023-08-22 | Stop reason: HOSPADM

## 2023-08-15 RX ORDER — OXYCODONE HYDROCHLORIDE 5 MG/1
5 TABLET ORAL
Status: CANCELLED | OUTPATIENT
Start: 2023-08-15 | End: 2023-08-16

## 2023-08-15 RX ORDER — POTASSIUM CHLORIDE 29.8 MG/ML
20 INJECTION INTRAVENOUS PRN
Status: DISCONTINUED | OUTPATIENT
Start: 2023-08-15 | End: 2023-08-22 | Stop reason: HOSPADM

## 2023-08-15 RX ORDER — SODIUM CHLORIDE 0.9 % (FLUSH) 0.9 %
5-40 SYRINGE (ML) INJECTION EVERY 12 HOURS SCHEDULED
Status: DISCONTINUED | OUTPATIENT
Start: 2023-08-15 | End: 2023-08-15 | Stop reason: HOSPADM

## 2023-08-15 RX ORDER — PROCHLORPERAZINE EDISYLATE 5 MG/ML
10 INJECTION INTRAMUSCULAR; INTRAVENOUS EVERY 6 HOURS PRN
Status: DISCONTINUED | OUTPATIENT
Start: 2023-08-15 | End: 2023-08-22 | Stop reason: HOSPADM

## 2023-08-15 RX ORDER — FUROSEMIDE 10 MG/ML
20 INJECTION INTRAMUSCULAR; INTRAVENOUS ONCE
Status: COMPLETED | OUTPATIENT
Start: 2023-08-15 | End: 2023-08-15

## 2023-08-15 RX ORDER — MAGNESIUM SULFATE IN WATER 40 MG/ML
2000 INJECTION, SOLUTION INTRAVENOUS PRN
Status: DISCONTINUED | OUTPATIENT
Start: 2023-08-15 | End: 2023-08-22 | Stop reason: HOSPADM

## 2023-08-15 RX ORDER — SODIUM CHLORIDE 9 MG/ML
INJECTION, SOLUTION INTRAVENOUS PRN
Status: DISCONTINUED | OUTPATIENT
Start: 2023-08-15 | End: 2023-08-22 | Stop reason: HOSPADM

## 2023-08-15 RX ORDER — ONDANSETRON 2 MG/ML
4 INJECTION INTRAMUSCULAR; INTRAVENOUS
Status: CANCELLED | OUTPATIENT
Start: 2023-08-15 | End: 2023-08-16

## 2023-08-15 RX ORDER — SCOLOPAMINE TRANSDERMAL SYSTEM 1 MG/1
1 PATCH, EXTENDED RELEASE TRANSDERMAL
Status: DISCONTINUED | OUTPATIENT
Start: 2023-08-15 | End: 2023-08-22 | Stop reason: HOSPADM

## 2023-08-15 RX ORDER — POTASSIUM CHLORIDE 7.45 MG/ML
10 INJECTION INTRAVENOUS PRN
Status: DISCONTINUED | OUTPATIENT
Start: 2023-08-15 | End: 2023-08-22 | Stop reason: HOSPADM

## 2023-08-15 RX ORDER — SCOLOPAMINE TRANSDERMAL SYSTEM 1 MG/1
1 PATCH, EXTENDED RELEASE TRANSDERMAL
Status: DISCONTINUED | OUTPATIENT
Start: 2023-08-15 | End: 2023-08-15 | Stop reason: HOSPADM

## 2023-08-15 RX ORDER — MIDAZOLAM HYDROCHLORIDE 2 MG/2ML
2 INJECTION, SOLUTION INTRAMUSCULAR; INTRAVENOUS
Status: DISCONTINUED | OUTPATIENT
Start: 2023-08-15 | End: 2023-08-15

## 2023-08-15 RX ORDER — ETOMIDATE 2 MG/ML
INJECTION INTRAVENOUS PRN
Status: DISCONTINUED | OUTPATIENT
Start: 2023-08-15 | End: 2023-08-15 | Stop reason: SDUPTHER

## 2023-08-15 RX ORDER — HYDROMORPHONE HYDROCHLORIDE 1 MG/ML
0.5 INJECTION, SOLUTION INTRAMUSCULAR; INTRAVENOUS; SUBCUTANEOUS EVERY 4 HOURS PRN
Status: DISCONTINUED | OUTPATIENT
Start: 2023-08-15 | End: 2023-08-16

## 2023-08-15 RX ORDER — METOCLOPRAMIDE HYDROCHLORIDE 5 MG/ML
10 INJECTION INTRAMUSCULAR; INTRAVENOUS
Status: CANCELLED | OUTPATIENT
Start: 2023-08-15 | End: 2023-08-16

## 2023-08-15 RX ORDER — FENTANYL CITRATE 50 UG/ML
25 INJECTION, SOLUTION INTRAMUSCULAR; INTRAVENOUS EVERY 5 MIN PRN
Status: CANCELLED | OUTPATIENT
Start: 2023-08-15

## 2023-08-15 RX ORDER — HYDROMORPHONE HYDROCHLORIDE 2 MG/ML
0.5 INJECTION, SOLUTION INTRAMUSCULAR; INTRAVENOUS; SUBCUTANEOUS EVERY 10 MIN PRN
Status: CANCELLED | OUTPATIENT
Start: 2023-08-15

## 2023-08-15 RX ORDER — LIDOCAINE HYDROCHLORIDE 20 MG/ML
INJECTION, SOLUTION EPIDURAL; INFILTRATION; INTRACAUDAL; PERINEURAL PRN
Status: DISCONTINUED | OUTPATIENT
Start: 2023-08-15 | End: 2023-08-15 | Stop reason: SDUPTHER

## 2023-08-15 RX ORDER — PROPOFOL 10 MG/ML
INJECTION, EMULSION INTRAVENOUS PRN
Status: DISCONTINUED | OUTPATIENT
Start: 2023-08-15 | End: 2023-08-15 | Stop reason: SDUPTHER

## 2023-08-15 RX ORDER — SODIUM CHLORIDE, SODIUM LACTATE, POTASSIUM CHLORIDE, CALCIUM CHLORIDE 600; 310; 30; 20 MG/100ML; MG/100ML; MG/100ML; MG/100ML
INJECTION, SOLUTION INTRAVENOUS CONTINUOUS PRN
Status: DISCONTINUED | OUTPATIENT
Start: 2023-08-15 | End: 2023-08-15 | Stop reason: SDUPTHER

## 2023-08-15 RX ORDER — SODIUM CHLORIDE, SODIUM LACTATE, POTASSIUM CHLORIDE, CALCIUM CHLORIDE 600; 310; 30; 20 MG/100ML; MG/100ML; MG/100ML; MG/100ML
INJECTION, SOLUTION INTRAVENOUS CONTINUOUS
Status: DISCONTINUED | OUTPATIENT
Start: 2023-08-15 | End: 2023-08-15

## 2023-08-15 RX ORDER — DIPHENHYDRAMINE HYDROCHLORIDE 50 MG/ML
12.5 INJECTION INTRAMUSCULAR; INTRAVENOUS
Status: CANCELLED | OUTPATIENT
Start: 2023-08-15 | End: 2023-08-16

## 2023-08-15 RX ORDER — FENTANYL CITRATE 50 UG/ML
100 INJECTION, SOLUTION INTRAMUSCULAR; INTRAVENOUS
Status: DISCONTINUED | OUTPATIENT
Start: 2023-08-15 | End: 2023-08-15

## 2023-08-15 RX ADMIN — HYDROMORPHONE HYDROCHLORIDE 0.5 MG: 1 INJECTION, SOLUTION INTRAMUSCULAR; INTRAVENOUS; SUBCUTANEOUS at 12:51

## 2023-08-15 RX ADMIN — MORPHINE SULFATE 1 MG: 2 INJECTION, SOLUTION INTRAMUSCULAR; INTRAVENOUS at 03:40

## 2023-08-15 RX ADMIN — SODIUM CHLORIDE, SODIUM LACTATE, POTASSIUM CHLORIDE, AND CALCIUM CHLORIDE: 600; 310; 30; 20 INJECTION, SOLUTION INTRAVENOUS at 11:00

## 2023-08-15 RX ADMIN — ONDANSETRON 4 MG: 2 INJECTION INTRAMUSCULAR; INTRAVENOUS at 17:41

## 2023-08-15 RX ADMIN — ETOMIDATE 2.5 MG: 2 INJECTION, SOLUTION INTRAVENOUS at 11:04

## 2023-08-15 RX ADMIN — SODIUM CHLORIDE, PRESERVATIVE FREE 10 ML: 5 INJECTION INTRAVENOUS at 07:58

## 2023-08-15 RX ADMIN — POTASSIUM PHOSPHATE, MONOBASIC POTASSIUM PHOSPHATE, DIBASIC: 224; 236 INJECTION, SOLUTION, CONCENTRATE INTRAVENOUS at 17:49

## 2023-08-15 RX ADMIN — I.V. FAT EMULSION 250 ML: 20 EMULSION INTRAVENOUS at 17:50

## 2023-08-15 RX ADMIN — LIDOCAINE HYDROCHLORIDE 100 MG: 20 INJECTION, SOLUTION EPIDURAL; INFILTRATION; INTRACAUDAL; PERINEURAL at 11:04

## 2023-08-15 RX ADMIN — SODIUM CHLORIDE, PRESERVATIVE FREE 10 ML: 5 INJECTION INTRAVENOUS at 20:33

## 2023-08-15 RX ADMIN — SODIUM CHLORIDE, PRESERVATIVE FREE 40 MG: 5 INJECTION INTRAVENOUS at 07:59

## 2023-08-15 RX ADMIN — FUROSEMIDE 20 MG: 10 INJECTION, SOLUTION INTRAMUSCULAR; INTRAVENOUS at 14:36

## 2023-08-15 RX ADMIN — ONDANSETRON 4 MG: 2 INJECTION INTRAMUSCULAR; INTRAVENOUS at 07:58

## 2023-08-15 RX ADMIN — SODIUM CHLORIDE, PRESERVATIVE FREE 10 ML: 5 INJECTION INTRAVENOUS at 17:42

## 2023-08-15 RX ADMIN — SODIUM CHLORIDE, PRESERVATIVE FREE 40 MG: 5 INJECTION INTRAVENOUS at 20:33

## 2023-08-15 RX ADMIN — HYDROMORPHONE HYDROCHLORIDE 0.5 MG: 1 INJECTION, SOLUTION INTRAMUSCULAR; INTRAVENOUS; SUBCUTANEOUS at 22:16

## 2023-08-15 RX ADMIN — HYDROMORPHONE HYDROCHLORIDE 0.5 MG: 1 INJECTION, SOLUTION INTRAMUSCULAR; INTRAVENOUS; SUBCUTANEOUS at 17:42

## 2023-08-15 RX ADMIN — PROPOFOL 25 MG: 10 INJECTION, EMULSION INTRAVENOUS at 11:05

## 2023-08-15 RX ADMIN — ONDANSETRON 4 MG: 2 INJECTION INTRAMUSCULAR; INTRAVENOUS at 22:16

## 2023-08-15 RX ADMIN — PROPOFOL 25 MG: 10 INJECTION, EMULSION INTRAVENOUS at 11:04

## 2023-08-15 ASSESSMENT — PAIN DESCRIPTION - FREQUENCY
FREQUENCY: CONTINUOUS

## 2023-08-15 ASSESSMENT — PAIN DESCRIPTION - DESCRIPTORS
DESCRIPTORS: ACHING;DISCOMFORT
DESCRIPTORS: ACHING

## 2023-08-15 ASSESSMENT — PAIN DESCRIPTION - PAIN TYPE
TYPE: CHRONIC PAIN

## 2023-08-15 ASSESSMENT — PAIN SCALES - GENERAL
PAINLEVEL_OUTOF10: 7
PAINLEVEL_OUTOF10: 6
PAINLEVEL_OUTOF10: 8
PAINLEVEL_OUTOF10: 9
PAINLEVEL_OUTOF10: 3
PAINLEVEL_OUTOF10: 8
PAINLEVEL_OUTOF10: 8

## 2023-08-15 ASSESSMENT — PAIN DESCRIPTION - ORIENTATION
ORIENTATION: ANTERIOR
ORIENTATION: ANTERIOR
ORIENTATION: RIGHT;LEFT;ANTERIOR;POSTERIOR

## 2023-08-15 ASSESSMENT — PAIN - FUNCTIONAL ASSESSMENT
PAIN_FUNCTIONAL_ASSESSMENT: PREVENTS OR INTERFERES SOME ACTIVE ACTIVITIES AND ADLS

## 2023-08-15 ASSESSMENT — PAIN DESCRIPTION - ONSET: ONSET: ON-GOING

## 2023-08-15 ASSESSMENT — PAIN DESCRIPTION - LOCATION
LOCATION: ABDOMEN
LOCATION: GENERALIZED
LOCATION: ABDOMEN
LOCATION: ABDOMEN

## 2023-08-15 NOTE — ED NOTES
TRANSFER - OUT REPORT:    Verbal report given to [address] [employer]  being transferred to [facility] 74 03  for routine progression of patient care       Report consisted of patient's Situation, Background, Assessment and   Recommendations(SBAR). Information from the following report(s) Nurse Handoff Report and ED SBAR was reviewed with the receiving nurse. [place] Fall Assessment:    Presents to emergency department  because of falls (Syncope, seizure, or loss of consciousness): No  Age > 70: No  Altered Mental Status, Intoxication with alcohol or substance confusion (Disorientation, impaired judgment, poor safety awaremess, or inability to follow instructions): No  Impaired Mobility: Ambulates or transfers with assistive devices or assistance; Unable to ambulate or transer.: Yes  Nursing Judgement: Yes          Lines:   Single Lumen Implantable Port Right Subclavian (Active)       Peripheral IV 08/14/23 Right Antecubital (Active)   Site Assessment Clean, dry & intact 08/14/23 1626   Line Status Flushed;Brisk blood return 08/14/23 1626   Phlebitis Assessment No symptoms 08/14/23 1626   Infiltration Assessment 0 08/14/23 1626   Dressing Status Clean, dry & intact 08/14/23 1626   Dressing Type Transparent 08/14/23 1626        Opportunity for questions and clarification was provided.       Patient transported with:  Sybil Lau RN  08/14/23 [id number]

## 2023-08-15 NOTE — OP NOTE
EGD Procedure Report    Aneta Oneil  [id number]  1975      Procedure:   EGD     Date:  8/15/2023. Endoscopist: Ahsan Snyder MD.    Referring Provider:  Hortencia Nguyen MD.    Estimated blood loss:  Minimal.    Sedation:MAC per Anesthesia     Complications:  None. Preoperative diagnosis: anemia. Procedure note: The patient was encountered in the preoperative area. After risks,benefits and alternatives explained including but not limited to the risk of infection, perforation, missed lesions, possibility of surgery to correct those risks and death. Informed consent was obtained and witnessed by the Nurse. The patient was examined. The patient was taken to the endoscopy suite and placed in the left lateral decubitus position. A bite block was inserted. The patient was sedated. Upon achieving adequate sedation, the upper endoscope scope was inserted into the oropharynx and advanced under direct visualization into the stomach and then further into the distal duodenum. In the distal duodenum, The endoscope was then slowly withdrawn and a careful inspection was performed with mucosal fold. The ampulla visualized present. The endoscope was then withdrawn into the antrum. In the antrum, there were findings consistent with nonerosive gastrits. There was a large ulcerated mass in the body of the stomach with suture material within the ulcer bed    The endoscope was then retroflexed. In the retroflexed view, there was no acute findings   The endoscope was then retroflexed and withdrawn into the distal esophagus. The GE junction was normal.  The endoscope was then slowly withdrawn through the esophagus, which appeared the have mucosa with grade c esophagitis. The upper endoscope was removed after air was suctioned out. The procedure was terminated.   The patient tolerated the procedure without any complication and was transported back to the recovery area in stable

## 2023-08-15 NOTE — ANESTHESIA POSTPROCEDURE EVALUATION
Department of Anesthesiology  Postprocedure Note    Patient: Chemo Ritchie  MRN: [id number]  YOB: 1975  Date of evaluation: 8/15/2023      Procedure Summary     Date: 08/15/23 Room / Location: [facility] ENDO 04 / [facility] ENDOSCOPY    Anesthesia Start: 1100 Anesthesia Stop: 1114    Procedure: EGD ESOPHAGOGASTRODUODENOSCOPY (Upper GI Region) Diagnosis:       Hematemesis, unspecified whether nausea present      (Hematemesis, unspecified whether nausea present [K92.0])    Surgeons: Otf Cazares MD Responsible Provider:     Anesthesia Type: TIVA ASA Status: 3          Anesthesia Type: No value filed.     Dianne Phase I: Dianne Score: 10    Dianne Phase II: Dianne Score: 10      Anesthesia Post Evaluation    Patient location during evaluation: PACU  Patient participation: complete - patient participated  Level of consciousness: awake and awake and alert  Airway patency: patent  Nausea & Vomiting: no nausea  Complications: no  Cardiovascular status: hemodynamically stable  Respiratory status: acceptable  Hydration status: euvolemic  Multimodal analgesia pain management approach  Pain management: adequate

## 2023-08-15 NOTE — ACP (ADVANCE CARE PLANNING)
[facility] Hospitalist Service  At the heart of better care     Advance Care Planning   Admit Date:  2023  6:74 PM   Name:  Johnny Durand   Age:  50 y.o. Sex:  female  :  1975   MRN:  [id number]   Room:  [place] [address]    Johnny Durand has capacity to make her own decisions:   Yes    Patient / surrogate decision-maker directed code status:  Full Code    Patient or surrogate consented to discussion of the current conditions, workup, management plans, prognosis, and the risk for further deterioration. Time spent: 17 minutes in direct discussion.       Signed:  Vitaliy Elena DO

## 2023-08-15 NOTE — PLAN OF CARE
Problem: Discharge Planning  Goal: Discharge to home or other facility with appropriate resources  8/15/2023 1805 by Jesusita Branch RN  Outcome: Progressing  Flowsheets (Taken 8/15/2023 0900 by Brenda Centeno RN)  Discharge to home or other facility with appropriate resources: Identify barriers to discharge with patient and caregiver  8/15/2023 0522 by Brenda Lorenzana RN  Outcome: Progressing     Problem: Pain  Goal: Verbalizes/displays adequate comfort level or baseline comfort level  8/15/2023 1805 by Jesusita Branch RN  Outcome: Progressing  Flowsheets  Taken 8/15/2023 1251 by Brenda Centeno RN  Verbalizes/displays adequate comfort level or baseline comfort level: Encourage patient to monitor pain and request assistance  Taken 8/15/2023 1230 by Jesusita Branch RN  Verbalizes/displays adequate comfort level or baseline comfort level:   Encourage patient to monitor pain and request assistance   Assess pain using appropriate pain scale  Taken 8/15/2023 0900 by Jesusita Branch RN  Verbalizes/displays adequate comfort level or baseline comfort level: Encourage patient to monitor pain and request assistance  8/15/2023 0522 by Brenda Lorenzana RN  Outcome: Progressing     Problem: Safety - Adult  Goal: Free from fall injury  8/15/2023 1805 by Jesusita Branch RN  Outcome: Progressing  Flowsheets (Taken 8/15/2023 0915)  Free From Fall Injury: Instruct family/caregiver on patient safety  8/15/2023 0522 by Brenda Lorenzana RN  Outcome: Progressing

## 2023-08-15 NOTE — CONSENT
Informed Consent for Blood Component Transfusion Note    I have discussed with the patient the rationale for blood component transfusion; its benefits in treating or preventing fatigue, organ damage, or death; and its risk which includes mild transfusion reactions, rare risk of blood borne infection, or more serious but rare reactions. I have discussed the alternatives to transfusion, including the risk and consequences of not receiving transfusion. The patient had an opportunity to ask questions and had agreed to proceed with transfusion of blood components.     Electronically signed by Hayde Rahman DO on 8/14/23 at 8:27 PM EDT

## 2023-08-15 NOTE — H&P
right-sided hydronephrosis. Etiology unclear. No ureteral stones. . Aorta/IVC:  Aorta normal. No aortic aneurysm or dissection. IVC normal. Lymph nodes:  No significant lymphadenopathy. Stomach: Presumed gastric sleeve procedure. Bowel: There is no obstruction or free air. Subtotal colectomy. Appendix: Not visualized. Peritoneum/Mesentery: There are extensive multiple loculated ascitic collections  similar to the prior exam.  Drainage catheter present in the large right lateral collection. The largest collection is in the pelvis. . Abdominal wall: Normal. PELVIS: Urinary bladder: Normal. Reproductive organs: Normal. Lymph Nodes: Normal. BONES:  Unremarkable. ADDITIONAL SIGNIFICANT FINDINGS:  None. 1.  There is a dilated fluid-filled esophagus, perhaps related to reflux. 2.  Small to moderate bilateral effusions are new since prior exam and could be related to heart failure. There is mild to moderate compressive right lower lobe atelectasis. 3.  No significant change in the large multiloculated ascitic collections, the largest in the pelvis. 4.  No change in a moderate to marked right-sided hydronephrosis. Etiology unclear. No ureteral stones. There may be mass effect upon the distal ureter by the large pelvic ascitic collection. Ann Birmingham M.D. 8/14/2023 7:46:00 PM        Signed:  Patricia Garcia DO    Part of this note may have been written by using a voice dictation software. The note has been proof read but may still contain some grammatical/other typographical errors.

## 2023-08-15 NOTE — CARE COORDINATION
Case Management Assessment  Initial Evaluation    Date/Time of Evaluation: 8/15/2023 9:51 AM  Assessment Completed by: ZAY Miguel    If patient is discharged prior to next notation, then this note serves as note for discharge by case management. Patient Name: Dacia Lawton                   YOB: 1975  Diagnosis: Hematemesis [K92.0]  Other ascites [R18.8]  Bilateral pleural effusion [J90]  Hematemesis with nausea [K92.0]  Anemia, unspecified type [D64.9]                   Date / Time: 8/14/2023  3:47 PM    Patient Admission Status: Inpatient   Readmission Risk (Low < 19, Mod (19-27), High > 27): Readmission Risk Score: 33.3    Current PCP: Mika Colbert MD  PCP verified by CM? Yes Mika Colbert MD)    Chart Reviewed: Yes      History Provided by: Patient, Medical Record  Patient Orientation: Alert and Oriented, Person, Place, Self    Patient Cognition: Alert    Hospitalization in the last 30 days (Readmission):  Yes    If yes, Readmission Assessment in CM Navigator will be completed. Advance Directives:      Code Status: Full Code   Patient's Primary Decision Maker is: Legal Next of Kin    Primary Decision MakerMariama Florence - [phone]    Discharge Planning:    Patient lives with: Spouse/Significant Other, Family Members Type of Home: House  Primary Care Giver: Self  Patient Support Systems include: Spouse/Significant Other, Children, Family Members, Other (Comment) ([address] [facility]: SN)   Current Financial resources: Other (Comment) (BCBS)  Current community resources: None  Current services prior to admission: Home Care, Durable Medical Equipment [facility]: SN)            Current DME: [employer] (TPN through [facility])            Type of Home Care services:  Nursing Services    ADLS  Prior functional level: Independent in ADLs/IADLs  Current functional level:  Independent in ADLs/IADLs    PT AM-PAC:   /24  OT AM-PAC:   /24    Family can provide

## 2023-08-16 ENCOUNTER — TELEPHONE (OUTPATIENT)
Dept: ONCOLOGY | Age: 48
End: 2023-08-16

## 2023-08-16 PROBLEM — R18.8 OTHER ASCITES: Status: ACTIVE | Noted: 2023-08-16

## 2023-08-16 LAB
ALBUMIN SERPL-MCNC: 1.6 G/DL (ref 3.5–5)
ALBUMIN/GLOB SERPL: 0.5 (ref 0.4–1.6)
ALP SERPL-CCNC: 145 U/L (ref 50–136)
ALT SERPL-CCNC: 35 U/L (ref 12–65)
ANION GAP SERPL CALC-SCNC: 7 MMOL/L (ref 2–11)
AST SERPL-CCNC: 22 U/L (ref 15–37)
BASOPHILS # BLD: 0 K/UL (ref 0–0.2)
BASOPHILS NFR BLD: 0 % (ref 0–2)
BILIRUB DIRECT SERPL-MCNC: <0.1 MG/DL
BILIRUB SERPL-MCNC: 0.3 MG/DL (ref 0.2–1.1)
BUN SERPL-MCNC: 19 MG/DL (ref 6–23)
CALCIUM SERPL-MCNC: 8.1 MG/DL (ref 8.3–10.4)
CHLORIDE SERPL-SCNC: 106 MMOL/L (ref 101–110)
CO2 SERPL-SCNC: 27 MMOL/L (ref 21–32)
CREAT SERPL-MCNC: 0.7 MG/DL (ref 0.6–1)
DIFFERENTIAL METHOD BLD: ABNORMAL
EOSINOPHIL # BLD: 0 K/UL (ref 0–0.8)
EOSINOPHIL NFR BLD: 1 % (ref 0.5–7.8)
ERYTHROCYTE [DISTWIDTH] IN BLOOD BY AUTOMATED COUNT: 19.2 % (ref 11.9–14.6)
FERRITIN SERPL-MCNC: 94 NG/ML (ref 8–388)
GLOBULIN SER CALC-MCNC: 3.4 G/DL (ref 2.8–4.5)
GLUCOSE SERPL-MCNC: 165 MG/DL (ref 65–100)
HCT VFR BLD AUTO: 23.6 % (ref 35.8–46.3)
HCT VFR BLD AUTO: 28.2 % (ref 35.8–46.3)
HCT VFR BLD AUTO: 29.3 % (ref 35.8–46.3)
HGB BLD-MCNC: 7.2 G/DL (ref 11.7–15.4)
HGB BLD-MCNC: 9 G/DL (ref 11.7–15.4)
HGB BLD-MCNC: 9.1 G/DL (ref 11.7–15.4)
HISTORY CHECK: NORMAL
IMM GRANULOCYTES # BLD AUTO: 0.1 K/UL (ref 0–0.5)
IMM GRANULOCYTES NFR BLD AUTO: 2 % (ref 0–5)
IRON SATN MFR SERPL: 12 %
IRON SERPL-MCNC: 18 UG/DL (ref 35–150)
LYMPHOCYTES # BLD: 1 K/UL (ref 0.5–4.6)
LYMPHOCYTES NFR BLD: 24 % (ref 13–44)
MAGNESIUM SERPL-MCNC: 2.2 MG/DL (ref 1.8–2.4)
MCH RBC QN AUTO: 28 PG (ref 26.1–32.9)
MCHC RBC AUTO-ENTMCNC: 30.5 G/DL (ref 31.4–35)
MCV RBC AUTO: 91.8 FL (ref 82–102)
MM INDURATION, POC: 0 MM (ref 0–5)
MONOCYTES # BLD: 0.4 K/UL (ref 0.1–1.3)
MONOCYTES NFR BLD: 9 % (ref 4–12)
NEUTS SEG # BLD: 2.7 K/UL (ref 1.7–8.2)
NEUTS SEG NFR BLD: 64 % (ref 43–78)
NRBC # BLD: 0.4 K/UL (ref 0–0.2)
PHOSPHATE SERPL-MCNC: 3.2 MG/DL (ref 2.5–4.5)
PLATELET # BLD AUTO: 379 K/UL (ref 150–450)
PMV BLD AUTO: 11.4 FL (ref 9.4–12.3)
POTASSIUM SERPL-SCNC: 3.5 MMOL/L (ref 3.5–5.1)
PPD, POC: NEGATIVE
PROT SERPL-MCNC: 5 G/DL (ref 6.3–8.2)
RBC # BLD AUTO: 2.57 M/UL (ref 4.05–5.2)
SODIUM SERPL-SCNC: 140 MMOL/L (ref 133–143)
TIBC SERPL-MCNC: 146 UG/DL (ref 250–450)
TRIGL SERPL-MCNC: 220 MG/DL (ref 35–150)
WBC # BLD AUTO: 4.2 K/UL (ref 4.3–11.1)

## 2023-08-16 PROCEDURE — 99232 SBSQ HOSP IP/OBS MODERATE 35: CPT | Performed by: INTERNAL MEDICINE

## 2023-08-16 PROCEDURE — 85018 HEMOGLOBIN: CPT

## 2023-08-16 PROCEDURE — 6360000002 HC RX W HCPCS: Performed by: INTERNAL MEDICINE

## 2023-08-16 PROCEDURE — 2500000003 HC RX 250 WO HCPCS: Performed by: NURSE PRACTITIONER

## 2023-08-16 PROCEDURE — 76604 US EXAM CHEST: CPT | Performed by: INTERNAL MEDICINE

## 2023-08-16 PROCEDURE — 85014 HEMATOCRIT: CPT

## 2023-08-16 PROCEDURE — 2140000001 HC CVICU INTERMEDIATE R&B

## 2023-08-16 PROCEDURE — 84100 ASSAY OF PHOSPHORUS: CPT

## 2023-08-16 PROCEDURE — 83540 ASSAY OF IRON: CPT

## 2023-08-16 PROCEDURE — 80048 BASIC METABOLIC PNL TOTAL CA: CPT

## 2023-08-16 PROCEDURE — 36430 TRANSFUSION BLD/BLD COMPNT: CPT

## 2023-08-16 PROCEDURE — 2500000003 HC RX 250 WO HCPCS: Performed by: INTERNAL MEDICINE

## 2023-08-16 PROCEDURE — 36415 COLL VENOUS BLD VENIPUNCTURE: CPT

## 2023-08-16 PROCEDURE — 2580000003 HC RX 258: Performed by: FAMILY MEDICINE

## 2023-08-16 PROCEDURE — BB4BZZZ ULTRASONOGRAPHY OF PLEURA: ICD-10-PCS | Performed by: INTERNAL MEDICINE

## 2023-08-16 PROCEDURE — 84478 ASSAY OF TRIGLYCERIDES: CPT

## 2023-08-16 PROCEDURE — 2580000003 HC RX 258: Performed by: INTERNAL MEDICINE

## 2023-08-16 PROCEDURE — 85025 COMPLETE CBC W/AUTO DIFF WBC: CPT

## 2023-08-16 PROCEDURE — 83735 ASSAY OF MAGNESIUM: CPT

## 2023-08-16 PROCEDURE — 82728 ASSAY OF FERRITIN: CPT

## 2023-08-16 PROCEDURE — 6360000002 HC RX W HCPCS: Performed by: STUDENT IN AN ORGANIZED HEALTH CARE EDUCATION/TRAINING PROGRAM

## 2023-08-16 PROCEDURE — 80076 HEPATIC FUNCTION PANEL: CPT

## 2023-08-16 PROCEDURE — A4216 STERILE WATER/SALINE, 10 ML: HCPCS | Performed by: FAMILY MEDICINE

## 2023-08-16 PROCEDURE — P9040 RBC LEUKOREDUCED IRRADIATED: HCPCS

## 2023-08-16 PROCEDURE — 6360000002 HC RX W HCPCS: Performed by: FAMILY MEDICINE

## 2023-08-16 PROCEDURE — C9113 INJ PANTOPRAZOLE SODIUM, VIA: HCPCS | Performed by: FAMILY MEDICINE

## 2023-08-16 PROCEDURE — 83550 IRON BINDING TEST: CPT

## 2023-08-16 RX ORDER — OXYCODONE HCL 5 MG/5 ML
5 SOLUTION, ORAL ORAL EVERY 4 HOURS PRN
Status: DISCONTINUED | OUTPATIENT
Start: 2023-08-16 | End: 2023-08-16

## 2023-08-16 RX ORDER — DIPHENHYDRAMINE HYDROCHLORIDE 50 MG/ML
25 INJECTION INTRAMUSCULAR; INTRAVENOUS SEE ADMIN INSTRUCTIONS
Status: DISCONTINUED | OUTPATIENT
Start: 2023-08-16 | End: 2023-08-22 | Stop reason: HOSPADM

## 2023-08-16 RX ORDER — HYDROMORPHONE HYDROCHLORIDE 1 MG/ML
1 INJECTION, SOLUTION INTRAMUSCULAR; INTRAVENOUS; SUBCUTANEOUS EVERY 4 HOURS PRN
Status: DISCONTINUED | OUTPATIENT
Start: 2023-08-16 | End: 2023-08-22 | Stop reason: HOSPADM

## 2023-08-16 RX ORDER — SODIUM CHLORIDE 9 MG/ML
INJECTION, SOLUTION INTRAVENOUS PRN
Status: DISCONTINUED | OUTPATIENT
Start: 2023-08-16 | End: 2023-08-22 | Stop reason: HOSPADM

## 2023-08-16 RX ORDER — ACETAMINOPHEN 160 MG/5ML
650 SUSPENSION ORAL SEE ADMIN INSTRUCTIONS
Status: DISCONTINUED | OUTPATIENT
Start: 2023-08-16 | End: 2023-08-22 | Stop reason: HOSPADM

## 2023-08-16 RX ADMIN — FUROSEMIDE 20 MG: 10 INJECTION, SOLUTION INTRAMUSCULAR; INTRAVENOUS at 09:29

## 2023-08-16 RX ADMIN — HYDROMORPHONE HYDROCHLORIDE 1 MG: 1 INJECTION, SOLUTION INTRAMUSCULAR; INTRAVENOUS; SUBCUTANEOUS at 20:09

## 2023-08-16 RX ADMIN — HYDROMORPHONE HYDROCHLORIDE 0.5 MG: 1 INJECTION, SOLUTION INTRAMUSCULAR; INTRAVENOUS; SUBCUTANEOUS at 02:56

## 2023-08-16 RX ADMIN — ONDANSETRON 4 MG: 2 INJECTION INTRAMUSCULAR; INTRAVENOUS at 16:52

## 2023-08-16 RX ADMIN — SODIUM CHLORIDE, PRESERVATIVE FREE 40 MG: 5 INJECTION INTRAVENOUS at 09:29

## 2023-08-16 RX ADMIN — SODIUM CHLORIDE, PRESERVATIVE FREE 10 ML: 5 INJECTION INTRAVENOUS at 20:17

## 2023-08-16 RX ADMIN — ONDANSETRON 4 MG: 2 INJECTION INTRAMUSCULAR; INTRAVENOUS at 04:27

## 2023-08-16 RX ADMIN — HYDROMORPHONE HYDROCHLORIDE 1 MG: 1 INJECTION, SOLUTION INTRAMUSCULAR; INTRAVENOUS; SUBCUTANEOUS at 11:11

## 2023-08-16 RX ADMIN — POTASSIUM PHOSPHATE, MONOBASIC POTASSIUM PHOSPHATE, DIBASIC: 224; 236 INJECTION, SOLUTION, CONCENTRATE INTRAVENOUS at 18:30

## 2023-08-16 RX ADMIN — SODIUM CHLORIDE, PRESERVATIVE FREE 40 MG: 5 INJECTION INTRAVENOUS at 20:09

## 2023-08-16 RX ADMIN — I.V. FAT EMULSION 250 ML: 20 EMULSION INTRAVENOUS at 18:23

## 2023-08-16 RX ADMIN — ONDANSETRON 4 MG: 2 INJECTION INTRAMUSCULAR; INTRAVENOUS at 10:31

## 2023-08-16 RX ADMIN — SODIUM CHLORIDE, PRESERVATIVE FREE 10 ML: 5 INJECTION INTRAVENOUS at 09:40

## 2023-08-16 RX ADMIN — HYDROMORPHONE HYDROCHLORIDE 0.5 MG: 1 INJECTION, SOLUTION INTRAMUSCULAR; INTRAVENOUS; SUBCUTANEOUS at 07:37

## 2023-08-16 RX ADMIN — HYDROMORPHONE HYDROCHLORIDE 1 MG: 1 INJECTION, SOLUTION INTRAMUSCULAR; INTRAVENOUS; SUBCUTANEOUS at 15:54

## 2023-08-16 RX ADMIN — ONDANSETRON 4 MG: 2 INJECTION INTRAMUSCULAR; INTRAVENOUS at 22:31

## 2023-08-16 ASSESSMENT — PAIN DESCRIPTION - LOCATION
LOCATION: ABDOMEN

## 2023-08-16 ASSESSMENT — PAIN DESCRIPTION - PAIN TYPE
TYPE: CHRONIC PAIN

## 2023-08-16 ASSESSMENT — PAIN SCALES - GENERAL
PAINLEVEL_OUTOF10: 3
PAINLEVEL_OUTOF10: 7
PAINLEVEL_OUTOF10: 6
PAINLEVEL_OUTOF10: 4
PAINLEVEL_OUTOF10: 5
PAINLEVEL_OUTOF10: 7
PAINLEVEL_OUTOF10: 2
PAINLEVEL_OUTOF10: 9
PAINLEVEL_OUTOF10: 7
PAINLEVEL_OUTOF10: 2
PAINLEVEL_OUTOF10: 5
PAINLEVEL_OUTOF10: 5
PAINLEVEL_OUTOF10: 7

## 2023-08-16 ASSESSMENT — PAIN DESCRIPTION - ONSET
ONSET: ON-GOING
ONSET: GRADUAL
ONSET: ON-GOING
ONSET: GRADUAL
ONSET: GRADUAL

## 2023-08-16 ASSESSMENT — PAIN DESCRIPTION - ORIENTATION
ORIENTATION: ANTERIOR
ORIENTATION: ANTERIOR
ORIENTATION: RIGHT;LEFT;UPPER;LOWER
ORIENTATION: ANTERIOR
ORIENTATION: MID;ANTERIOR
ORIENTATION: MID
ORIENTATION: MID;ANTERIOR

## 2023-08-16 ASSESSMENT — PAIN DESCRIPTION - DESCRIPTORS
DESCRIPTORS: ACHING
DESCRIPTORS: DISCOMFORT;SHARP
DESCRIPTORS: DISCOMFORT;TIGHTNESS
DESCRIPTORS: ACHING
DESCRIPTORS: SHARP;DISCOMFORT
DESCRIPTORS: DISCOMFORT;STABBING

## 2023-08-16 ASSESSMENT — PAIN DESCRIPTION - FREQUENCY
FREQUENCY: CONTINUOUS

## 2023-08-16 NOTE — TELEPHONE ENCOUNTER
Physician provider: Deep Araiza MD  Reason for today's call: Home health  Last office visit:08/10/23      [facility] that would like to verify if an Order to be signed for Adan Gonzalez which was faxed on 06/23/23 was received. Order #[id number] & Order dated 09/28/22.         Rylan GIFFORD#[phone]

## 2023-08-17 LAB
ABO + RH BLD: NORMAL
ALBUMIN SERPL-MCNC: 1.7 G/DL (ref 3.5–5)
ALBUMIN/GLOB SERPL: 0.5 (ref 0.4–1.6)
ALP SERPL-CCNC: 144 U/L (ref 50–136)
ALT SERPL-CCNC: 29 U/L (ref 12–65)
ANION GAP SERPL CALC-SCNC: 9 MMOL/L (ref 2–11)
AST SERPL-CCNC: 18 U/L (ref 15–37)
BASOPHILS # BLD: 0.1 K/UL (ref 0–0.2)
BASOPHILS NFR BLD: 1 % (ref 0–2)
BILIRUB SERPL-MCNC: 0.5 MG/DL (ref 0.2–1.1)
BLD PROD TYP BPU: NORMAL
BLD PROD TYP BPU: NORMAL
BLOOD BANK DISPENSE STATUS: NORMAL
BLOOD BANK DISPENSE STATUS: NORMAL
BLOOD GROUP ANTIBODIES SERPL: NORMAL
BPU ID: NORMAL
BPU ID: NORMAL
BUN SERPL-MCNC: 23 MG/DL (ref 6–23)
CALCIUM SERPL-MCNC: 8.3 MG/DL (ref 8.3–10.4)
CHLORIDE SERPL-SCNC: 108 MMOL/L (ref 101–110)
CO2 SERPL-SCNC: 25 MMOL/L (ref 21–32)
CREAT SERPL-MCNC: 0.5 MG/DL (ref 0.6–1)
CROSSMATCH RESULT: NORMAL
CROSSMATCH RESULT: NORMAL
DIFFERENTIAL METHOD BLD: ABNORMAL
EOSINOPHIL # BLD: 0.1 K/UL (ref 0–0.8)
EOSINOPHIL NFR BLD: 1 % (ref 0.5–7.8)
ERYTHROCYTE [DISTWIDTH] IN BLOOD BY AUTOMATED COUNT: 19.1 % (ref 11.9–14.6)
GLOBULIN SER CALC-MCNC: 3.5 G/DL (ref 2.8–4.5)
GLUCOSE SERPL-MCNC: 176 MG/DL (ref 65–100)
HCT VFR BLD AUTO: 27.9 % (ref 35.8–46.3)
HCT VFR BLD AUTO: 29.4 % (ref 35.8–46.3)
HCT VFR BLD AUTO: 32.7 % (ref 35.8–46.3)
HGB BLD-MCNC: 10.1 G/DL (ref 11.7–15.4)
HGB BLD-MCNC: 8.8 G/DL (ref 11.7–15.4)
HGB BLD-MCNC: 9.3 G/DL (ref 11.7–15.4)
IMM GRANULOCYTES # BLD AUTO: 0.3 K/UL (ref 0–0.5)
IMM GRANULOCYTES NFR BLD AUTO: 6 % (ref 0–5)
LYMPHOCYTES # BLD: 1.1 K/UL (ref 0.5–4.6)
LYMPHOCYTES NFR BLD: 23 % (ref 13–44)
MAGNESIUM SERPL-MCNC: 2.3 MG/DL (ref 1.8–2.4)
MCH RBC QN AUTO: 28.1 PG (ref 26.1–32.9)
MCHC RBC AUTO-ENTMCNC: 31.5 G/DL (ref 31.4–35)
MCV RBC AUTO: 89.1 FL (ref 82–102)
MONOCYTES # BLD: 0.5 K/UL (ref 0.1–1.3)
MONOCYTES NFR BLD: 11 % (ref 4–12)
NEUTS SEG # BLD: 2.8 K/UL (ref 1.7–8.2)
NEUTS SEG NFR BLD: 58 % (ref 43–78)
NRBC # BLD: 0.56 K/UL (ref 0–0.2)
PHOSPHATE SERPL-MCNC: 3.5 MG/DL (ref 2.5–4.5)
PLATELET # BLD AUTO: 373 K/UL (ref 150–450)
PLATELET COMMENT: ADEQUATE
PMV BLD AUTO: 10.8 FL (ref 9.4–12.3)
POTASSIUM SERPL-SCNC: 3.5 MMOL/L (ref 3.5–5.1)
PROT SERPL-MCNC: 5.2 G/DL (ref 6.3–8.2)
RBC # BLD AUTO: 3.13 M/UL (ref 4.05–5.2)
RBC MORPH BLD: ABNORMAL
SODIUM SERPL-SCNC: 142 MMOL/L (ref 133–143)
SPECIMEN EXP DATE BLD: NORMAL
UNIT DIVISION: 0
UNIT DIVISION: 0
WBC # BLD AUTO: 4.9 K/UL (ref 4.3–11.1)
WBC MORPH BLD: ABNORMAL

## 2023-08-17 PROCEDURE — 2500000003 HC RX 250 WO HCPCS: Performed by: INTERNAL MEDICINE

## 2023-08-17 PROCEDURE — 97530 THERAPEUTIC ACTIVITIES: CPT

## 2023-08-17 PROCEDURE — A4216 STERILE WATER/SALINE, 10 ML: HCPCS | Performed by: FAMILY MEDICINE

## 2023-08-17 PROCEDURE — 85014 HEMATOCRIT: CPT

## 2023-08-17 PROCEDURE — 36415 COLL VENOUS BLD VENIPUNCTURE: CPT

## 2023-08-17 PROCEDURE — 6360000002 HC RX W HCPCS: Performed by: FAMILY MEDICINE

## 2023-08-17 PROCEDURE — 1100000000 HC RM PRIVATE

## 2023-08-17 PROCEDURE — 80053 COMPREHEN METABOLIC PANEL: CPT

## 2023-08-17 PROCEDURE — 2500000003 HC RX 250 WO HCPCS: Performed by: NURSE PRACTITIONER

## 2023-08-17 PROCEDURE — 97116 GAIT TRAINING THERAPY: CPT

## 2023-08-17 PROCEDURE — 2580000003 HC RX 258: Performed by: FAMILY MEDICINE

## 2023-08-17 PROCEDURE — 85018 HEMOGLOBIN: CPT

## 2023-08-17 PROCEDURE — C9113 INJ PANTOPRAZOLE SODIUM, VIA: HCPCS | Performed by: FAMILY MEDICINE

## 2023-08-17 PROCEDURE — 2580000003 HC RX 258: Performed by: INTERNAL MEDICINE

## 2023-08-17 PROCEDURE — 83735 ASSAY OF MAGNESIUM: CPT

## 2023-08-17 PROCEDURE — 6360000002 HC RX W HCPCS: Performed by: INTERNAL MEDICINE

## 2023-08-17 PROCEDURE — 99232 SBSQ HOSP IP/OBS MODERATE 35: CPT | Performed by: INTERNAL MEDICINE

## 2023-08-17 PROCEDURE — 85025 COMPLETE CBC W/AUTO DIFF WBC: CPT

## 2023-08-17 PROCEDURE — 6360000002 HC RX W HCPCS: Performed by: STUDENT IN AN ORGANIZED HEALTH CARE EDUCATION/TRAINING PROGRAM

## 2023-08-17 PROCEDURE — 84100 ASSAY OF PHOSPHORUS: CPT

## 2023-08-17 PROCEDURE — APPSS30 APP SPLIT SHARED TIME 16-30 MINUTES: Performed by: NURSE PRACTITIONER

## 2023-08-17 RX ORDER — OXYCODONE HCL 5 MG/5 ML
5 SOLUTION, ORAL ORAL EVERY 4 HOURS PRN
Status: DISCONTINUED | OUTPATIENT
Start: 2023-08-17 | End: 2023-08-17

## 2023-08-17 RX ORDER — OXYCODONE HCL 5 MG/5 ML
10 SOLUTION, ORAL ORAL EVERY 4 HOURS PRN
Status: DISCONTINUED | OUTPATIENT
Start: 2023-08-17 | End: 2023-08-22 | Stop reason: HOSPADM

## 2023-08-17 RX ADMIN — SODIUM CHLORIDE, PRESERVATIVE FREE 10 ML: 5 INJECTION INTRAVENOUS at 09:08

## 2023-08-17 RX ADMIN — ONDANSETRON 4 MG: 2 INJECTION INTRAMUSCULAR; INTRAVENOUS at 10:32

## 2023-08-17 RX ADMIN — ONDANSETRON 4 MG: 2 INJECTION INTRAMUSCULAR; INTRAVENOUS at 04:18

## 2023-08-17 RX ADMIN — FUROSEMIDE 20 MG: 10 INJECTION, SOLUTION INTRAMUSCULAR; INTRAVENOUS at 08:59

## 2023-08-17 RX ADMIN — HYDROMORPHONE HYDROCHLORIDE 1 MG: 1 INJECTION, SOLUTION INTRAMUSCULAR; INTRAVENOUS; SUBCUTANEOUS at 08:59

## 2023-08-17 RX ADMIN — HYDROMORPHONE HYDROCHLORIDE 1 MG: 1 INJECTION, SOLUTION INTRAMUSCULAR; INTRAVENOUS; SUBCUTANEOUS at 21:09

## 2023-08-17 RX ADMIN — ONDANSETRON 4 MG: 2 INJECTION INTRAMUSCULAR; INTRAVENOUS at 16:48

## 2023-08-17 RX ADMIN — HYDROMORPHONE HYDROCHLORIDE 1 MG: 1 INJECTION, SOLUTION INTRAMUSCULAR; INTRAVENOUS; SUBCUTANEOUS at 00:02

## 2023-08-17 RX ADMIN — ONDANSETRON 4 MG: 2 INJECTION INTRAMUSCULAR; INTRAVENOUS at 23:09

## 2023-08-17 RX ADMIN — I.V. FAT EMULSION 250 ML: 20 EMULSION INTRAVENOUS at 18:17

## 2023-08-17 RX ADMIN — SODIUM CHLORIDE, PRESERVATIVE FREE 40 MG: 5 INJECTION INTRAVENOUS at 21:08

## 2023-08-17 RX ADMIN — HYDROMORPHONE HYDROCHLORIDE 1 MG: 1 INJECTION, SOLUTION INTRAMUSCULAR; INTRAVENOUS; SUBCUTANEOUS at 04:18

## 2023-08-17 RX ADMIN — SODIUM CHLORIDE, PRESERVATIVE FREE 10 ML: 5 INJECTION INTRAVENOUS at 21:10

## 2023-08-17 RX ADMIN — POTASSIUM PHOSPHATE, MONOBASIC POTASSIUM PHOSPHATE, DIBASIC: 224; 236 INJECTION, SOLUTION, CONCENTRATE INTRAVENOUS at 18:17

## 2023-08-17 RX ADMIN — SODIUM CHLORIDE, PRESERVATIVE FREE 40 MG: 5 INJECTION INTRAVENOUS at 08:59

## 2023-08-17 RX ADMIN — HYDROMORPHONE HYDROCHLORIDE 1 MG: 1 INJECTION, SOLUTION INTRAMUSCULAR; INTRAVENOUS; SUBCUTANEOUS at 16:48

## 2023-08-17 RX ADMIN — HYDROMORPHONE HYDROCHLORIDE 1 MG: 1 INJECTION, SOLUTION INTRAMUSCULAR; INTRAVENOUS; SUBCUTANEOUS at 12:41

## 2023-08-17 ASSESSMENT — PAIN DESCRIPTION - ORIENTATION
ORIENTATION: ANTERIOR
ORIENTATION: MID
ORIENTATION: RIGHT;LEFT;UPPER;LOWER
ORIENTATION: RIGHT;LEFT;LOWER;UPPER
ORIENTATION: ANTERIOR

## 2023-08-17 ASSESSMENT — PAIN DESCRIPTION - LOCATION
LOCATION: ABDOMEN

## 2023-08-17 ASSESSMENT — PAIN DESCRIPTION - FREQUENCY
FREQUENCY: CONTINUOUS

## 2023-08-17 ASSESSMENT — PAIN DESCRIPTION - DESCRIPTORS
DESCRIPTORS: DISCOMFORT;SHARP
DESCRIPTORS: DISCOMFORT
DESCRIPTORS: DISCOMFORT;ACHING
DESCRIPTORS: DISCOMFORT;SHARP
DESCRIPTORS: ACHING
DESCRIPTORS: ACHING

## 2023-08-17 ASSESSMENT — PAIN DESCRIPTION - ONSET
ONSET: GRADUAL

## 2023-08-17 ASSESSMENT — PAIN DESCRIPTION - PAIN TYPE
TYPE: CHRONIC PAIN

## 2023-08-17 ASSESSMENT — PAIN SCALES - GENERAL
PAINLEVEL_OUTOF10: 3
PAINLEVEL_OUTOF10: 7
PAINLEVEL_OUTOF10: 4
PAINLEVEL_OUTOF10: 7
PAINLEVEL_OUTOF10: 7
PAINLEVEL_OUTOF10: 4
PAINLEVEL_OUTOF10: 5
PAINLEVEL_OUTOF10: 7
PAINLEVEL_OUTOF10: 7
PAINLEVEL_OUTOF10: 3
PAINLEVEL_OUTOF10: 7

## 2023-08-17 NOTE — CARE COORDINATION
CM continues to follow for discharge planning and/or CM needs. Plan remains that pt will return home when medically stable for discharge. [facility] following for home TPN resumption needs. PT/OT completed evaluation and recommend home health at discharge - pt is current with [facility]. Referral and order sent this day. No additional CM needs at this time. Will continue to monitor and update as needed.

## 2023-08-18 LAB
ALBUMIN SERPL-MCNC: 1.7 G/DL (ref 3.5–5)
ALBUMIN/GLOB SERPL: 0.5 (ref 0.4–1.6)
ALP SERPL-CCNC: 157 U/L (ref 50–136)
ALT SERPL-CCNC: 27 U/L (ref 12–65)
ANION GAP SERPL CALC-SCNC: 8 MMOL/L (ref 2–11)
AST SERPL-CCNC: 17 U/L (ref 15–37)
BASOPHILS # BLD: 0 K/UL (ref 0–0.2)
BASOPHILS NFR BLD: 0 % (ref 0–2)
BILIRUB SERPL-MCNC: 0.2 MG/DL (ref 0.2–1.1)
BUN SERPL-MCNC: 24 MG/DL (ref 6–23)
CALCIUM SERPL-MCNC: 8.3 MG/DL (ref 8.3–10.4)
CHLORIDE SERPL-SCNC: 106 MMOL/L (ref 101–110)
CO2 SERPL-SCNC: 27 MMOL/L (ref 21–32)
CREAT SERPL-MCNC: 0.5 MG/DL (ref 0.6–1)
DIFFERENTIAL METHOD BLD: ABNORMAL
EOSINOPHIL # BLD: 0 K/UL (ref 0–0.8)
EOSINOPHIL NFR BLD: 0 % (ref 0.5–7.8)
ERYTHROCYTE [DISTWIDTH] IN BLOOD BY AUTOMATED COUNT: 19.6 % (ref 11.9–14.6)
GLOBULIN SER CALC-MCNC: 3.4 G/DL (ref 2.8–4.5)
GLUCOSE SERPL-MCNC: 155 MG/DL (ref 65–100)
HCT VFR BLD AUTO: 27.6 % (ref 35.8–46.3)
HGB BLD-MCNC: 8.5 G/DL (ref 11.7–15.4)
IMM GRANULOCYTES # BLD AUTO: 0.4 K/UL (ref 0–0.5)
IMM GRANULOCYTES NFR BLD AUTO: 7 % (ref 0–5)
LYMPHOCYTES # BLD: 1.2 K/UL (ref 0.5–4.6)
LYMPHOCYTES NFR BLD: 23 % (ref 13–44)
MAGNESIUM SERPL-MCNC: 1.7 MG/DL (ref 1.8–2.4)
MCH RBC QN AUTO: 28 PG (ref 26.1–32.9)
MCHC RBC AUTO-ENTMCNC: 30.8 G/DL (ref 31.4–35)
MCV RBC AUTO: 90.8 FL (ref 82–102)
MONOCYTES # BLD: 0.8 K/UL (ref 0.1–1.3)
MONOCYTES NFR BLD: 16 % (ref 4–12)
NEUTS SEG # BLD: 2.8 K/UL (ref 1.7–8.2)
NEUTS SEG NFR BLD: 54 % (ref 43–78)
NRBC # BLD: 0.27 K/UL (ref 0–0.2)
PHOSPHATE SERPL-MCNC: 4.4 MG/DL (ref 2.5–4.5)
PLATELET # BLD AUTO: 354 K/UL (ref 150–450)
PLATELET COMMENT: ADEQUATE
PMV BLD AUTO: 10.5 FL (ref 9.4–12.3)
POTASSIUM SERPL-SCNC: 3.3 MMOL/L (ref 3.5–5.1)
PROT SERPL-MCNC: 5.1 G/DL (ref 6.3–8.2)
RBC # BLD AUTO: 3.04 M/UL (ref 4.05–5.2)
RBC MORPH BLD: ABNORMAL
RBC MORPH BLD: ABNORMAL
SODIUM SERPL-SCNC: 141 MMOL/L (ref 133–143)
WBC # BLD AUTO: 5.2 K/UL (ref 4.3–11.1)
WBC MORPH BLD: ABNORMAL

## 2023-08-18 PROCEDURE — 6360000002 HC RX W HCPCS: Performed by: FAMILY MEDICINE

## 2023-08-18 PROCEDURE — 85025 COMPLETE CBC W/AUTO DIFF WBC: CPT

## 2023-08-18 PROCEDURE — 99231 SBSQ HOSP IP/OBS SF/LOW 25: CPT | Performed by: INTERNAL MEDICINE

## 2023-08-18 PROCEDURE — 2500000003 HC RX 250 WO HCPCS: Performed by: INTERNAL MEDICINE

## 2023-08-18 PROCEDURE — APPSS30 APP SPLIT SHARED TIME 16-30 MINUTES: Performed by: NURSE PRACTITIONER

## 2023-08-18 PROCEDURE — 2580000003 HC RX 258: Performed by: INTERNAL MEDICINE

## 2023-08-18 PROCEDURE — C9113 INJ PANTOPRAZOLE SODIUM, VIA: HCPCS | Performed by: FAMILY MEDICINE

## 2023-08-18 PROCEDURE — 6360000002 HC RX W HCPCS: Performed by: STUDENT IN AN ORGANIZED HEALTH CARE EDUCATION/TRAINING PROGRAM

## 2023-08-18 PROCEDURE — 80053 COMPREHEN METABOLIC PANEL: CPT

## 2023-08-18 PROCEDURE — 2580000003 HC RX 258: Performed by: FAMILY MEDICINE

## 2023-08-18 PROCEDURE — A4216 STERILE WATER/SALINE, 10 ML: HCPCS | Performed by: FAMILY MEDICINE

## 2023-08-18 PROCEDURE — 2500000003 HC RX 250 WO HCPCS: Performed by: NURSE PRACTITIONER

## 2023-08-18 PROCEDURE — 84100 ASSAY OF PHOSPHORUS: CPT

## 2023-08-18 PROCEDURE — 6360000002 HC RX W HCPCS: Performed by: INTERNAL MEDICINE

## 2023-08-18 PROCEDURE — 97116 GAIT TRAINING THERAPY: CPT

## 2023-08-18 PROCEDURE — 6370000000 HC RX 637 (ALT 250 FOR IP): Performed by: NURSE PRACTITIONER

## 2023-08-18 PROCEDURE — 83735 ASSAY OF MAGNESIUM: CPT

## 2023-08-18 PROCEDURE — 97530 THERAPEUTIC ACTIVITIES: CPT

## 2023-08-18 PROCEDURE — 1100000000 HC RM PRIVATE

## 2023-08-18 PROCEDURE — 36591 DRAW BLOOD OFF VENOUS DEVICE: CPT

## 2023-08-18 RX ADMIN — HYDROMORPHONE HYDROCHLORIDE 1 MG: 1 INJECTION, SOLUTION INTRAMUSCULAR; INTRAVENOUS; SUBCUTANEOUS at 05:58

## 2023-08-18 RX ADMIN — SODIUM CHLORIDE, PRESERVATIVE FREE 10 ML: 5 INJECTION INTRAVENOUS at 08:50

## 2023-08-18 RX ADMIN — I.V. FAT EMULSION 250 ML: 20 EMULSION INTRAVENOUS at 18:09

## 2023-08-18 RX ADMIN — SODIUM CHLORIDE, PRESERVATIVE FREE 40 MG: 5 INJECTION INTRAVENOUS at 20:06

## 2023-08-18 RX ADMIN — SODIUM CHLORIDE, PRESERVATIVE FREE 40 MG: 5 INJECTION INTRAVENOUS at 08:50

## 2023-08-18 RX ADMIN — POTASSIUM CHLORIDE 20 MEQ: 400 INJECTION, SOLUTION INTRAVENOUS at 12:12

## 2023-08-18 RX ADMIN — POTASSIUM PHOSPHATE, MONOBASIC POTASSIUM PHOSPHATE, DIBASIC: 224; 236 INJECTION, SOLUTION, CONCENTRATE INTRAVENOUS at 18:09

## 2023-08-18 RX ADMIN — SODIUM CHLORIDE, PRESERVATIVE FREE 10 ML: 5 INJECTION INTRAVENOUS at 20:01

## 2023-08-18 RX ADMIN — ONDANSETRON 4 MG: 2 INJECTION INTRAMUSCULAR; INTRAVENOUS at 05:40

## 2023-08-18 RX ADMIN — HYDROMORPHONE HYDROCHLORIDE 1 MG: 1 INJECTION, SOLUTION INTRAMUSCULAR; INTRAVENOUS; SUBCUTANEOUS at 10:41

## 2023-08-18 RX ADMIN — OXYCODONE HYDROCHLORIDE 5 MG: 5 SOLUTION ORAL at 09:09

## 2023-08-18 RX ADMIN — POTASSIUM CHLORIDE 20 MEQ: 400 INJECTION, SOLUTION INTRAVENOUS at 13:46

## 2023-08-18 RX ADMIN — ONDANSETRON 4 MG: 2 INJECTION INTRAMUSCULAR; INTRAVENOUS at 18:20

## 2023-08-18 RX ADMIN — FUROSEMIDE 20 MG: 10 INJECTION, SOLUTION INTRAMUSCULAR; INTRAVENOUS at 08:49

## 2023-08-18 RX ADMIN — HYDROMORPHONE HYDROCHLORIDE 1 MG: 1 INJECTION, SOLUTION INTRAMUSCULAR; INTRAVENOUS; SUBCUTANEOUS at 21:55

## 2023-08-18 RX ADMIN — HYDROMORPHONE HYDROCHLORIDE 1 MG: 1 INJECTION, SOLUTION INTRAMUSCULAR; INTRAVENOUS; SUBCUTANEOUS at 01:51

## 2023-08-18 RX ADMIN — HYDROMORPHONE HYDROCHLORIDE 1 MG: 1 INJECTION, SOLUTION INTRAMUSCULAR; INTRAVENOUS; SUBCUTANEOUS at 16:53

## 2023-08-18 RX ADMIN — ONDANSETRON 4 MG: 2 INJECTION INTRAMUSCULAR; INTRAVENOUS at 12:12

## 2023-08-18 ASSESSMENT — PAIN DESCRIPTION - DESCRIPTORS
DESCRIPTORS: ACHING;DISCOMFORT
DESCRIPTORS: DISCOMFORT;ACHING
DESCRIPTORS: ACHING;DISCOMFORT

## 2023-08-18 ASSESSMENT — PAIN DESCRIPTION - ORIENTATION
ORIENTATION: MID

## 2023-08-18 ASSESSMENT — PAIN SCALES - GENERAL
PAINLEVEL_OUTOF10: 8
PAINLEVEL_OUTOF10: 7
PAINLEVEL_OUTOF10: 6
PAINLEVEL_OUTOF10: 7

## 2023-08-18 ASSESSMENT — PAIN DESCRIPTION - LOCATION
LOCATION: ABDOMEN

## 2023-08-18 ASSESSMENT — PAIN - FUNCTIONAL ASSESSMENT
PAIN_FUNCTIONAL_ASSESSMENT: ACTIVITIES ARE NOT PREVENTED

## 2023-08-18 ASSESSMENT — PAIN DESCRIPTION - ONSET: ONSET: ON-GOING

## 2023-08-18 ASSESSMENT — PAIN DESCRIPTION - FREQUENCY: FREQUENCY: CONTINUOUS

## 2023-08-18 ASSESSMENT — PAIN DESCRIPTION - PAIN TYPE: TYPE: CHRONIC PAIN

## 2023-08-18 NOTE — CARE COORDINATION
LOS 4d  PT/OT evaluated recommendation is for [facility] referral  Sent referral to 9th floor for review. Discharge plan ongoing, no further concerns as of present. Please consult  if any new issues arise.

## 2023-08-19 LAB
ALBUMIN SERPL-MCNC: 1.7 G/DL (ref 3.5–5)
ALBUMIN/GLOB SERPL: 0.5 (ref 0.4–1.6)
ALP SERPL-CCNC: 165 U/L (ref 50–136)
ALT SERPL-CCNC: 25 U/L (ref 12–65)
ANION GAP SERPL CALC-SCNC: 5 MMOL/L (ref 2–11)
AST SERPL-CCNC: 18 U/L (ref 15–37)
BASOPHILS # BLD: 0 K/UL (ref 0–0.2)
BASOPHILS NFR BLD: 0 % (ref 0–2)
BILIRUB SERPL-MCNC: 0.3 MG/DL (ref 0.2–1.1)
BUN SERPL-MCNC: 24 MG/DL (ref 6–23)
CALCIUM SERPL-MCNC: 8.4 MG/DL (ref 8.3–10.4)
CHLORIDE SERPL-SCNC: 108 MMOL/L (ref 101–110)
CO2 SERPL-SCNC: 30 MMOL/L (ref 21–32)
CREAT SERPL-MCNC: 0.5 MG/DL (ref 0.6–1)
DIFFERENTIAL METHOD BLD: ABNORMAL
EOSINOPHIL # BLD: 0 K/UL (ref 0–0.8)
EOSINOPHIL NFR BLD: 0 % (ref 0.5–7.8)
ERYTHROCYTE [DISTWIDTH] IN BLOOD BY AUTOMATED COUNT: 19.7 % (ref 11.9–14.6)
GLOBULIN SER CALC-MCNC: 3.4 G/DL (ref 2.8–4.5)
GLUCOSE SERPL-MCNC: 123 MG/DL (ref 65–100)
HCT VFR BLD AUTO: 28 % (ref 35.8–46.3)
HGB BLD-MCNC: 8.3 G/DL (ref 11.7–15.4)
IMM GRANULOCYTES # BLD AUTO: 0.3 K/UL (ref 0–0.5)
IMM GRANULOCYTES NFR BLD AUTO: 5 % (ref 0–5)
LYMPHOCYTES # BLD: 1.4 K/UL (ref 0.5–4.6)
LYMPHOCYTES NFR BLD: 23 % (ref 13–44)
MAGNESIUM SERPL-MCNC: 1.7 MG/DL (ref 1.8–2.4)
MCH RBC QN AUTO: 27.1 PG (ref 26.1–32.9)
MCHC RBC AUTO-ENTMCNC: 29.6 G/DL (ref 31.4–35)
MCV RBC AUTO: 91.5 FL (ref 82–102)
MONOCYTES # BLD: 1.4 K/UL (ref 0.1–1.3)
MONOCYTES NFR BLD: 23 % (ref 4–12)
NEUTS SEG # BLD: 3 K/UL (ref 1.7–8.2)
NEUTS SEG NFR BLD: 49 % (ref 43–78)
NRBC # BLD: 0.14 K/UL (ref 0–0.2)
PHOSPHATE SERPL-MCNC: 3.8 MG/DL (ref 2.5–4.5)
PLATELET # BLD AUTO: 346 K/UL (ref 150–450)
PLATELET COMMENT: ADEQUATE
PMV BLD AUTO: 10.5 FL (ref 9.4–12.3)
POTASSIUM SERPL-SCNC: 3.5 MMOL/L (ref 3.5–5.1)
PROT SERPL-MCNC: 5.1 G/DL (ref 6.3–8.2)
RBC # BLD AUTO: 3.06 M/UL (ref 4.05–5.2)
RBC MORPH BLD: ABNORMAL
RBC MORPH BLD: ABNORMAL
SODIUM SERPL-SCNC: 143 MMOL/L (ref 133–143)
WBC # BLD AUTO: 6.1 K/UL (ref 4.3–11.1)
WBC MORPH BLD: ABNORMAL

## 2023-08-19 PROCEDURE — 80053 COMPREHEN METABOLIC PANEL: CPT

## 2023-08-19 PROCEDURE — 84100 ASSAY OF PHOSPHORUS: CPT

## 2023-08-19 PROCEDURE — 36591 DRAW BLOOD OFF VENOUS DEVICE: CPT

## 2023-08-19 PROCEDURE — 6360000002 HC RX W HCPCS: Performed by: FAMILY MEDICINE

## 2023-08-19 PROCEDURE — 1100000000 HC RM PRIVATE

## 2023-08-19 PROCEDURE — 85025 COMPLETE CBC W/AUTO DIFF WBC: CPT

## 2023-08-19 PROCEDURE — 2500000003 HC RX 250 WO HCPCS: Performed by: INTERNAL MEDICINE

## 2023-08-19 PROCEDURE — A4216 STERILE WATER/SALINE, 10 ML: HCPCS | Performed by: FAMILY MEDICINE

## 2023-08-19 PROCEDURE — 2580000003 HC RX 258: Performed by: INTERNAL MEDICINE

## 2023-08-19 PROCEDURE — APPSS45 APP SPLIT SHARED TIME 31-45 MINUTES

## 2023-08-19 PROCEDURE — 6360000002 HC RX W HCPCS: Performed by: INTERNAL MEDICINE

## 2023-08-19 PROCEDURE — 6360000002 HC RX W HCPCS

## 2023-08-19 PROCEDURE — 2500000003 HC RX 250 WO HCPCS: Performed by: NURSE PRACTITIONER

## 2023-08-19 PROCEDURE — 6360000002 HC RX W HCPCS: Performed by: STUDENT IN AN ORGANIZED HEALTH CARE EDUCATION/TRAINING PROGRAM

## 2023-08-19 PROCEDURE — 2580000003 HC RX 258: Performed by: FAMILY MEDICINE

## 2023-08-19 PROCEDURE — C9113 INJ PANTOPRAZOLE SODIUM, VIA: HCPCS | Performed by: FAMILY MEDICINE

## 2023-08-19 PROCEDURE — 99232 SBSQ HOSP IP/OBS MODERATE 35: CPT | Performed by: INTERNAL MEDICINE

## 2023-08-19 PROCEDURE — 83735 ASSAY OF MAGNESIUM: CPT

## 2023-08-19 RX ORDER — MAGNESIUM SULFATE IN WATER 40 MG/ML
2000 INJECTION, SOLUTION INTRAVENOUS ONCE
Status: COMPLETED | OUTPATIENT
Start: 2023-08-19 | End: 2023-08-19

## 2023-08-19 RX ADMIN — SODIUM CHLORIDE, PRESERVATIVE FREE 40 MG: 5 INJECTION INTRAVENOUS at 20:17

## 2023-08-19 RX ADMIN — MAGNESIUM SULFATE HEPTAHYDRATE 2000 MG: 40 INJECTION, SOLUTION INTRAVENOUS at 09:35

## 2023-08-19 RX ADMIN — HYDROMORPHONE HYDROCHLORIDE 1 MG: 1 INJECTION, SOLUTION INTRAMUSCULAR; INTRAVENOUS; SUBCUTANEOUS at 17:55

## 2023-08-19 RX ADMIN — ONDANSETRON 4 MG: 2 INJECTION INTRAMUSCULAR; INTRAVENOUS at 03:22

## 2023-08-19 RX ADMIN — ONDANSETRON 4 MG: 2 INJECTION INTRAMUSCULAR; INTRAVENOUS at 11:29

## 2023-08-19 RX ADMIN — HYDROMORPHONE HYDROCHLORIDE 1 MG: 1 INJECTION, SOLUTION INTRAMUSCULAR; INTRAVENOUS; SUBCUTANEOUS at 03:22

## 2023-08-19 RX ADMIN — ONDANSETRON 4 MG: 2 INJECTION INTRAMUSCULAR; INTRAVENOUS at 20:17

## 2023-08-19 RX ADMIN — HYDROMORPHONE HYDROCHLORIDE 1 MG: 1 INJECTION, SOLUTION INTRAMUSCULAR; INTRAVENOUS; SUBCUTANEOUS at 22:13

## 2023-08-19 RX ADMIN — HYDROMORPHONE HYDROCHLORIDE 1 MG: 1 INJECTION, SOLUTION INTRAMUSCULAR; INTRAVENOUS; SUBCUTANEOUS at 08:11

## 2023-08-19 RX ADMIN — HYDROMORPHONE HYDROCHLORIDE 1 MG: 1 INJECTION, SOLUTION INTRAMUSCULAR; INTRAVENOUS; SUBCUTANEOUS at 13:50

## 2023-08-19 RX ADMIN — POTASSIUM CHLORIDE: 2 INJECTION, SOLUTION, CONCENTRATE INTRAVENOUS at 17:58

## 2023-08-19 RX ADMIN — SODIUM CHLORIDE, PRESERVATIVE FREE 10 ML: 5 INJECTION INTRAVENOUS at 20:18

## 2023-08-19 RX ADMIN — FUROSEMIDE 20 MG: 10 INJECTION, SOLUTION INTRAMUSCULAR; INTRAVENOUS at 08:11

## 2023-08-19 RX ADMIN — SODIUM CHLORIDE, PRESERVATIVE FREE 40 MG: 5 INJECTION INTRAVENOUS at 08:11

## 2023-08-19 RX ADMIN — I.V. FAT EMULSION 250 ML: 20 EMULSION INTRAVENOUS at 17:58

## 2023-08-19 RX ADMIN — SODIUM CHLORIDE, PRESERVATIVE FREE 10 ML: 5 INJECTION INTRAVENOUS at 08:12

## 2023-08-19 ASSESSMENT — PAIN - FUNCTIONAL ASSESSMENT: PAIN_FUNCTIONAL_ASSESSMENT: PREVENTS OR INTERFERES SOME ACTIVE ACTIVITIES AND ADLS

## 2023-08-19 ASSESSMENT — PAIN DESCRIPTION - LOCATION
LOCATION: ABDOMEN

## 2023-08-19 ASSESSMENT — PAIN DESCRIPTION - ORIENTATION
ORIENTATION: RIGHT;MID;LEFT
ORIENTATION: RIGHT;LEFT

## 2023-08-19 ASSESSMENT — PAIN SCALES - GENERAL
PAINLEVEL_OUTOF10: 8
PAINLEVEL_OUTOF10: 7
PAINLEVEL_OUTOF10: 7
PAINLEVEL_OUTOF10: 9
PAINLEVEL_OUTOF10: 9

## 2023-08-19 ASSESSMENT — PAIN DESCRIPTION - DESCRIPTORS
DESCRIPTORS: ACHING
DESCRIPTORS: DISCOMFORT;STABBING
DESCRIPTORS: ACHING

## 2023-08-20 LAB
ALBUMIN SERPL-MCNC: 1.7 G/DL (ref 3.5–5)
ALBUMIN/GLOB SERPL: 0.5 (ref 0.4–1.6)
ALP SERPL-CCNC: 161 U/L (ref 50–136)
ALT SERPL-CCNC: 23 U/L (ref 12–65)
ANION GAP SERPL CALC-SCNC: 7 MMOL/L (ref 2–11)
AST SERPL-CCNC: 21 U/L (ref 15–37)
BASOPHILS # BLD: 0 K/UL (ref 0–0.2)
BASOPHILS NFR BLD: 0 % (ref 0–2)
BILIRUB SERPL-MCNC: 0.3 MG/DL (ref 0.2–1.1)
BUN SERPL-MCNC: 24 MG/DL (ref 6–23)
CALCIUM SERPL-MCNC: 8.2 MG/DL (ref 8.3–10.4)
CHLORIDE SERPL-SCNC: 108 MMOL/L (ref 101–110)
CO2 SERPL-SCNC: 27 MMOL/L (ref 21–32)
CREAT SERPL-MCNC: 0.5 MG/DL (ref 0.6–1)
DIFFERENTIAL METHOD BLD: ABNORMAL
EOSINOPHIL # BLD: 0 K/UL (ref 0–0.8)
EOSINOPHIL NFR BLD: 0 % (ref 0.5–7.8)
ERYTHROCYTE [DISTWIDTH] IN BLOOD BY AUTOMATED COUNT: 19.5 % (ref 11.9–14.6)
GLOBULIN SER CALC-MCNC: 3.4 G/DL (ref 2.8–4.5)
GLUCOSE SERPL-MCNC: 131 MG/DL (ref 65–100)
HCT VFR BLD AUTO: 28 % (ref 35.8–46.3)
HGB BLD-MCNC: 8.2 G/DL (ref 11.7–15.4)
IMM GRANULOCYTES # BLD AUTO: 0.2 K/UL (ref 0–0.5)
IMM GRANULOCYTES NFR BLD AUTO: 3 % (ref 0–5)
LYMPHOCYTES # BLD: 1.4 K/UL (ref 0.5–4.6)
LYMPHOCYTES NFR BLD: 18 % (ref 13–44)
MAGNESIUM SERPL-MCNC: 2 MG/DL (ref 1.8–2.4)
MCH RBC QN AUTO: 27.3 PG (ref 26.1–32.9)
MCHC RBC AUTO-ENTMCNC: 29.3 G/DL (ref 31.4–35)
MCV RBC AUTO: 93.3 FL (ref 82–102)
MONOCYTES # BLD: 1.5 K/UL (ref 0.1–1.3)
MONOCYTES NFR BLD: 19 % (ref 4–12)
NEUTS SEG # BLD: 4.5 K/UL (ref 1.7–8.2)
NEUTS SEG NFR BLD: 60 % (ref 43–78)
NRBC # BLD: 0.04 K/UL (ref 0–0.2)
PLATELET # BLD AUTO: 343 K/UL (ref 150–450)
PMV BLD AUTO: 10.8 FL (ref 9.4–12.3)
POTASSIUM SERPL-SCNC: 3.5 MMOL/L (ref 3.5–5.1)
PROT SERPL-MCNC: 5.1 G/DL (ref 6.3–8.2)
RBC # BLD AUTO: 3 M/UL (ref 4.05–5.2)
SODIUM SERPL-SCNC: 142 MMOL/L (ref 133–143)
WBC # BLD AUTO: 7.5 K/UL (ref 4.3–11.1)

## 2023-08-20 PROCEDURE — 83735 ASSAY OF MAGNESIUM: CPT

## 2023-08-20 PROCEDURE — C9113 INJ PANTOPRAZOLE SODIUM, VIA: HCPCS | Performed by: FAMILY MEDICINE

## 2023-08-20 PROCEDURE — 2500000003 HC RX 250 WO HCPCS: Performed by: INTERNAL MEDICINE

## 2023-08-20 PROCEDURE — 2580000003 HC RX 258: Performed by: FAMILY MEDICINE

## 2023-08-20 PROCEDURE — 6360000002 HC RX W HCPCS: Performed by: STUDENT IN AN ORGANIZED HEALTH CARE EDUCATION/TRAINING PROGRAM

## 2023-08-20 PROCEDURE — APPSS45 APP SPLIT SHARED TIME 31-45 MINUTES

## 2023-08-20 PROCEDURE — 2500000003 HC RX 250 WO HCPCS: Performed by: NURSE PRACTITIONER

## 2023-08-20 PROCEDURE — 2580000003 HC RX 258: Performed by: INTERNAL MEDICINE

## 2023-08-20 PROCEDURE — 80053 COMPREHEN METABOLIC PANEL: CPT

## 2023-08-20 PROCEDURE — A4216 STERILE WATER/SALINE, 10 ML: HCPCS | Performed by: FAMILY MEDICINE

## 2023-08-20 PROCEDURE — 6360000002 HC RX W HCPCS: Performed by: INTERNAL MEDICINE

## 2023-08-20 PROCEDURE — 85025 COMPLETE CBC W/AUTO DIFF WBC: CPT

## 2023-08-20 PROCEDURE — 1100000000 HC RM PRIVATE

## 2023-08-20 PROCEDURE — 6360000002 HC RX W HCPCS: Performed by: FAMILY MEDICINE

## 2023-08-20 PROCEDURE — 99231 SBSQ HOSP IP/OBS SF/LOW 25: CPT | Performed by: INTERNAL MEDICINE

## 2023-08-20 RX ADMIN — SODIUM CHLORIDE, PRESERVATIVE FREE 10 ML: 5 INJECTION INTRAVENOUS at 07:42

## 2023-08-20 RX ADMIN — HYDROMORPHONE HYDROCHLORIDE 1 MG: 1 INJECTION, SOLUTION INTRAMUSCULAR; INTRAVENOUS; SUBCUTANEOUS at 12:16

## 2023-08-20 RX ADMIN — ONDANSETRON 4 MG: 2 INJECTION INTRAMUSCULAR; INTRAVENOUS at 23:44

## 2023-08-20 RX ADMIN — HYDROMORPHONE HYDROCHLORIDE 1 MG: 1 INJECTION, SOLUTION INTRAMUSCULAR; INTRAVENOUS; SUBCUTANEOUS at 06:32

## 2023-08-20 RX ADMIN — ONDANSETRON 4 MG: 2 INJECTION INTRAMUSCULAR; INTRAVENOUS at 17:10

## 2023-08-20 RX ADMIN — SODIUM CHLORIDE, PRESERVATIVE FREE 40 MG: 5 INJECTION INTRAVENOUS at 07:41

## 2023-08-20 RX ADMIN — ONDANSETRON 4 MG: 2 INJECTION INTRAMUSCULAR; INTRAVENOUS at 02:30

## 2023-08-20 RX ADMIN — I.V. FAT EMULSION 250 ML: 20 EMULSION INTRAVENOUS at 17:11

## 2023-08-20 RX ADMIN — HYDROMORPHONE HYDROCHLORIDE 1 MG: 1 INJECTION, SOLUTION INTRAMUSCULAR; INTRAVENOUS; SUBCUTANEOUS at 02:30

## 2023-08-20 RX ADMIN — SODIUM CHLORIDE, PRESERVATIVE FREE 10 ML: 5 INJECTION INTRAVENOUS at 21:10

## 2023-08-20 RX ADMIN — SODIUM CHLORIDE, PRESERVATIVE FREE 40 MG: 5 INJECTION INTRAVENOUS at 21:09

## 2023-08-20 RX ADMIN — FUROSEMIDE 20 MG: 10 INJECTION, SOLUTION INTRAMUSCULAR; INTRAVENOUS at 07:41

## 2023-08-20 RX ADMIN — POTASSIUM CHLORIDE: 2 INJECTION, SOLUTION, CONCENTRATE INTRAVENOUS at 17:11

## 2023-08-20 RX ADMIN — HYDROMORPHONE HYDROCHLORIDE 1 MG: 1 INJECTION, SOLUTION INTRAMUSCULAR; INTRAVENOUS; SUBCUTANEOUS at 23:43

## 2023-08-20 RX ADMIN — HYDROMORPHONE HYDROCHLORIDE 1 MG: 1 INJECTION, SOLUTION INTRAMUSCULAR; INTRAVENOUS; SUBCUTANEOUS at 17:10

## 2023-08-20 RX ADMIN — ONDANSETRON 4 MG: 2 INJECTION INTRAMUSCULAR; INTRAVENOUS at 07:41

## 2023-08-20 ASSESSMENT — PAIN DESCRIPTION - LOCATION
LOCATION: ABDOMEN

## 2023-08-20 ASSESSMENT — PAIN SCALES - GENERAL
PAINLEVEL_OUTOF10: 9
PAINLEVEL_OUTOF10: 7
PAINLEVEL_OUTOF10: 8

## 2023-08-20 ASSESSMENT — PAIN DESCRIPTION - ORIENTATION
ORIENTATION: RIGHT;LEFT;MID
ORIENTATION: RIGHT;LEFT
ORIENTATION: LOWER;LEFT;RIGHT
ORIENTATION: RIGHT;LEFT;MID
ORIENTATION: RIGHT;LEFT;MID

## 2023-08-20 ASSESSMENT — PAIN - FUNCTIONAL ASSESSMENT
PAIN_FUNCTIONAL_ASSESSMENT: PREVENTS OR INTERFERES SOME ACTIVE ACTIVITIES AND ADLS

## 2023-08-20 ASSESSMENT — PAIN DESCRIPTION - DESCRIPTORS
DESCRIPTORS: DISCOMFORT;SHARP
DESCRIPTORS: ACHING
DESCRIPTORS: DISCOMFORT
DESCRIPTORS: SHARP
DESCRIPTORS: DISCOMFORT

## 2023-08-21 LAB
ALBUMIN SERPL-MCNC: 1.6 G/DL (ref 3.5–5)
ALBUMIN/GLOB SERPL: 0.5 (ref 0.4–1.6)
ALP SERPL-CCNC: 157 U/L (ref 50–136)
ALT SERPL-CCNC: 19 U/L (ref 12–65)
ANION GAP SERPL CALC-SCNC: 7 MMOL/L (ref 2–11)
AST SERPL-CCNC: 21 U/L (ref 15–37)
BASOPHILS # BLD: 0 K/UL (ref 0–0.2)
BASOPHILS NFR BLD: 0 % (ref 0–2)
BILIRUB SERPL-MCNC: 0.2 MG/DL (ref 0.2–1.1)
BUN SERPL-MCNC: 24 MG/DL (ref 6–23)
CALCIUM SERPL-MCNC: 8 MG/DL (ref 8.3–10.4)
CHLORIDE SERPL-SCNC: 108 MMOL/L (ref 101–110)
CO2 SERPL-SCNC: 26 MMOL/L (ref 21–32)
CREAT SERPL-MCNC: 0.5 MG/DL (ref 0.6–1)
DIFFERENTIAL METHOD BLD: ABNORMAL
EOSINOPHIL # BLD: 0 K/UL (ref 0–0.8)
EOSINOPHIL NFR BLD: 0 % (ref 0.5–7.8)
ERYTHROCYTE [DISTWIDTH] IN BLOOD BY AUTOMATED COUNT: 19.2 % (ref 11.9–14.6)
GLOBULIN SER CALC-MCNC: 3.3 G/DL (ref 2.8–4.5)
GLUCOSE SERPL-MCNC: 127 MG/DL (ref 65–100)
HCT VFR BLD AUTO: 26.3 % (ref 35.8–46.3)
HGB BLD-MCNC: 7.8 G/DL (ref 11.7–15.4)
IMM GRANULOCYTES # BLD AUTO: 0.1 K/UL (ref 0–0.5)
IMM GRANULOCYTES NFR BLD AUTO: 2 % (ref 0–5)
LYMPHOCYTES # BLD: 1.3 K/UL (ref 0.5–4.6)
LYMPHOCYTES NFR BLD: 19 % (ref 13–44)
MAGNESIUM SERPL-MCNC: 1.8 MG/DL (ref 1.8–2.4)
MCH RBC QN AUTO: 27.7 PG (ref 26.1–32.9)
MCHC RBC AUTO-ENTMCNC: 29.7 G/DL (ref 31.4–35)
MCV RBC AUTO: 93.3 FL (ref 82–102)
MONOCYTES # BLD: 1.5 K/UL (ref 0.1–1.3)
MONOCYTES NFR BLD: 21 % (ref 4–12)
NEUTS SEG # BLD: 4.1 K/UL (ref 1.7–8.2)
NEUTS SEG NFR BLD: 58 % (ref 43–78)
NRBC # BLD: 0.02 K/UL (ref 0–0.2)
PHOSPHATE SERPL-MCNC: 3.7 MG/DL (ref 2.5–4.5)
PLATELET # BLD AUTO: 312 K/UL (ref 150–450)
PMV BLD AUTO: 10.7 FL (ref 9.4–12.3)
POTASSIUM SERPL-SCNC: 3.4 MMOL/L (ref 3.5–5.1)
PROT SERPL-MCNC: 4.9 G/DL (ref 6.3–8.2)
RBC # BLD AUTO: 2.82 M/UL (ref 4.05–5.2)
SODIUM SERPL-SCNC: 141 MMOL/L (ref 133–143)
WBC # BLD AUTO: 7.1 K/UL (ref 4.3–11.1)

## 2023-08-21 PROCEDURE — 2580000003 HC RX 258: Performed by: INTERNAL MEDICINE

## 2023-08-21 PROCEDURE — 2500000003 HC RX 250 WO HCPCS: Performed by: NURSE PRACTITIONER

## 2023-08-21 PROCEDURE — APPSS30 APP SPLIT SHARED TIME 16-30 MINUTES: Performed by: NURSE PRACTITIONER

## 2023-08-21 PROCEDURE — 6370000000 HC RX 637 (ALT 250 FOR IP): Performed by: NURSE PRACTITIONER

## 2023-08-21 PROCEDURE — 6360000002 HC RX W HCPCS: Performed by: INTERNAL MEDICINE

## 2023-08-21 PROCEDURE — 6360000002 HC RX W HCPCS: Performed by: FAMILY MEDICINE

## 2023-08-21 PROCEDURE — 80053 COMPREHEN METABOLIC PANEL: CPT

## 2023-08-21 PROCEDURE — 6360000002 HC RX W HCPCS: Performed by: STUDENT IN AN ORGANIZED HEALTH CARE EDUCATION/TRAINING PROGRAM

## 2023-08-21 PROCEDURE — 36591 DRAW BLOOD OFF VENOUS DEVICE: CPT

## 2023-08-21 PROCEDURE — 97530 THERAPEUTIC ACTIVITIES: CPT

## 2023-08-21 PROCEDURE — 83735 ASSAY OF MAGNESIUM: CPT

## 2023-08-21 PROCEDURE — A4216 STERILE WATER/SALINE, 10 ML: HCPCS | Performed by: FAMILY MEDICINE

## 2023-08-21 PROCEDURE — 85025 COMPLETE CBC W/AUTO DIFF WBC: CPT

## 2023-08-21 PROCEDURE — 84100 ASSAY OF PHOSPHORUS: CPT

## 2023-08-21 PROCEDURE — 2580000003 HC RX 258: Performed by: FAMILY MEDICINE

## 2023-08-21 PROCEDURE — C9113 INJ PANTOPRAZOLE SODIUM, VIA: HCPCS | Performed by: FAMILY MEDICINE

## 2023-08-21 PROCEDURE — 1100000000 HC RM PRIVATE

## 2023-08-21 PROCEDURE — 2500000003 HC RX 250 WO HCPCS: Performed by: INTERNAL MEDICINE

## 2023-08-21 PROCEDURE — 99232 SBSQ HOSP IP/OBS MODERATE 35: CPT | Performed by: INTERNAL MEDICINE

## 2023-08-21 PROCEDURE — 97110 THERAPEUTIC EXERCISES: CPT

## 2023-08-21 RX ORDER — FENTANYL 25 UG/1
1 PATCH TRANSDERMAL
Status: DISCONTINUED | OUTPATIENT
Start: 2023-08-21 | End: 2023-08-22 | Stop reason: HOSPADM

## 2023-08-21 RX ADMIN — ONDANSETRON 4 MG: 2 INJECTION INTRAMUSCULAR; INTRAVENOUS at 14:03

## 2023-08-21 RX ADMIN — HYDROMORPHONE HYDROCHLORIDE 1 MG: 1 INJECTION, SOLUTION INTRAMUSCULAR; INTRAVENOUS; SUBCUTANEOUS at 22:03

## 2023-08-21 RX ADMIN — FUROSEMIDE 20 MG: 10 INJECTION, SOLUTION INTRAMUSCULAR; INTRAVENOUS at 08:56

## 2023-08-21 RX ADMIN — SODIUM CHLORIDE, PRESERVATIVE FREE 10 ML: 5 INJECTION INTRAVENOUS at 08:57

## 2023-08-21 RX ADMIN — SODIUM CHLORIDE, PRESERVATIVE FREE 40 MG: 5 INJECTION INTRAVENOUS at 22:02

## 2023-08-21 RX ADMIN — ONDANSETRON 4 MG: 2 INJECTION INTRAMUSCULAR; INTRAVENOUS at 06:29

## 2023-08-21 RX ADMIN — POTASSIUM CHLORIDE: 2 INJECTION, SOLUTION, CONCENTRATE INTRAVENOUS at 17:35

## 2023-08-21 RX ADMIN — ONDANSETRON 4 MG: 2 INJECTION INTRAMUSCULAR; INTRAVENOUS at 22:03

## 2023-08-21 RX ADMIN — SODIUM CHLORIDE, PRESERVATIVE FREE 10 ML: 5 INJECTION INTRAVENOUS at 22:03

## 2023-08-21 RX ADMIN — SODIUM CHLORIDE, PRESERVATIVE FREE 40 MG: 5 INJECTION INTRAVENOUS at 08:56

## 2023-08-21 RX ADMIN — POTASSIUM CHLORIDE 20 MEQ: 400 INJECTION, SOLUTION INTRAVENOUS at 11:00

## 2023-08-21 RX ADMIN — HYDROMORPHONE HYDROCHLORIDE 1 MG: 1 INJECTION, SOLUTION INTRAMUSCULAR; INTRAVENOUS; SUBCUTANEOUS at 15:34

## 2023-08-21 RX ADMIN — HYDROMORPHONE HYDROCHLORIDE 1 MG: 1 INJECTION, SOLUTION INTRAMUSCULAR; INTRAVENOUS; SUBCUTANEOUS at 11:00

## 2023-08-21 RX ADMIN — HYDROMORPHONE HYDROCHLORIDE 1 MG: 1 INJECTION, SOLUTION INTRAMUSCULAR; INTRAVENOUS; SUBCUTANEOUS at 06:29

## 2023-08-21 RX ADMIN — I.V. FAT EMULSION 250 ML: 20 EMULSION INTRAVENOUS at 17:35

## 2023-08-21 RX ADMIN — POTASSIUM CHLORIDE 20 MEQ: 400 INJECTION, SOLUTION INTRAVENOUS at 09:10

## 2023-08-21 ASSESSMENT — PAIN DESCRIPTION - DESCRIPTORS
DESCRIPTORS: DISCOMFORT;ACHING;PRESSURE
DESCRIPTORS: DISCOMFORT
DESCRIPTORS: DISCOMFORT

## 2023-08-21 ASSESSMENT — PAIN DESCRIPTION - LOCATION
LOCATION: ABDOMEN

## 2023-08-21 ASSESSMENT — PAIN DESCRIPTION - ORIENTATION
ORIENTATION: RIGHT;LEFT;MID
ORIENTATION: MID
ORIENTATION: RIGHT;LEFT;MID

## 2023-08-21 ASSESSMENT — PAIN - FUNCTIONAL ASSESSMENT
PAIN_FUNCTIONAL_ASSESSMENT: PREVENTS OR INTERFERES SOME ACTIVE ACTIVITIES AND ADLS

## 2023-08-21 ASSESSMENT — PAIN SCALES - GENERAL
PAINLEVEL_OUTOF10: 7
PAINLEVEL_OUTOF10: 7
PAINLEVEL_OUTOF10: 8
PAINLEVEL_OUTOF10: 4

## 2023-08-21 ASSESSMENT — PAIN DESCRIPTION - FREQUENCY: FREQUENCY: CONTINUOUS

## 2023-08-21 ASSESSMENT — PAIN DESCRIPTION - PAIN TYPE: TYPE: CHRONIC PAIN

## 2023-08-21 ASSESSMENT — PAIN DESCRIPTION - ONSET: ONSET: GRADUAL

## 2023-08-21 NOTE — PLAN OF CARE
Problem: Discharge Planning  Goal: Discharge to home or other facility with appropriate resources  Outcome: Progressing     Problem: Pain  Goal: Verbalizes/displays adequate comfort level or baseline comfort level  Outcome: Progressing  Flowsheets (Taken 8/21/2023 0317 by Angélica Rios RN)  Verbalizes/displays adequate comfort level or baseline comfort level:   Encourage patient to monitor pain and request assistance   Assess pain using appropriate pain scale   Administer analgesics based on type and severity of pain and evaluate response   Implement non-pharmacological measures as appropriate and evaluate response   Consider cultural and social influences on pain and pain management     Problem: Safety - Adult  Goal: Free from fall injury  Outcome: Progressing     Problem: Skin/Tissue Integrity  Goal: Absence of new skin breakdown  Description: 1. Monitor for areas of redness and/or skin breakdown  2. Assess vascular access sites hourly  3. Every 4-6 hours minimum:  Change oxygen saturation probe site  4. Every 4-6 hours:  If on nasal continuous positive airway pressure, respiratory therapy assess nares and determine need for appliance change or resting period.   Outcome: Progressing

## 2023-08-21 NOTE — CARE COORDINATION
LOS 7d  IDR discussed with Oncology NP about recommendation for [facility]. Referral was sent but all Oncology treatments would have to be placed on hold during time of rehab as per [facility] guidelines. Oncology also included a order for addition infusion at time of discharge if patient returns home. Information sent to  [facility] that are currently following. Discharge plan is [facility] vs Home Health and ongoing treatments. Discharge plan ongoing, no further concerns as of present. Please consult  if any new issues arise.

## 2023-08-22 VITALS
TEMPERATURE: 98.1 F | SYSTOLIC BLOOD PRESSURE: 113 MMHG | HEIGHT: 64 IN | DIASTOLIC BLOOD PRESSURE: 82 MMHG | HEART RATE: 106 BPM | WEIGHT: 157.1 LBS | RESPIRATION RATE: 16 BRPM | BODY MASS INDEX: 26.82 KG/M2 | OXYGEN SATURATION: 96 %

## 2023-08-22 LAB
ALBUMIN SERPL-MCNC: 1.6 G/DL (ref 3.5–5)
ALBUMIN/GLOB SERPL: 0.4 (ref 0.4–1.6)
ALP SERPL-CCNC: 176 U/L (ref 50–136)
ALT SERPL-CCNC: 24 U/L (ref 12–65)
ANION GAP SERPL CALC-SCNC: 8 MMOL/L (ref 2–11)
AST SERPL-CCNC: 18 U/L (ref 15–37)
BASOPHILS # BLD: 0 K/UL (ref 0–0.2)
BASOPHILS NFR BLD: 0 % (ref 0–2)
BILIRUB SERPL-MCNC: 0.2 MG/DL (ref 0.2–1.1)
BUN SERPL-MCNC: 27 MG/DL (ref 6–23)
CALCIUM SERPL-MCNC: 8.3 MG/DL (ref 8.3–10.4)
CHLORIDE SERPL-SCNC: 107 MMOL/L (ref 101–110)
CO2 SERPL-SCNC: 26 MMOL/L (ref 21–32)
CREAT SERPL-MCNC: 0.5 MG/DL (ref 0.6–1)
DIFFERENTIAL METHOD BLD: ABNORMAL
EOSINOPHIL # BLD: 0 K/UL (ref 0–0.8)
EOSINOPHIL NFR BLD: 0 % (ref 0.5–7.8)
ERYTHROCYTE [DISTWIDTH] IN BLOOD BY AUTOMATED COUNT: 19.1 % (ref 11.9–14.6)
GLOBULIN SER CALC-MCNC: 3.7 G/DL (ref 2.8–4.5)
GLUCOSE SERPL-MCNC: 143 MG/DL (ref 65–100)
HCT VFR BLD AUTO: 27.3 % (ref 35.8–46.3)
HGB BLD-MCNC: 8 G/DL (ref 11.7–15.4)
IMM GRANULOCYTES # BLD AUTO: 0.1 K/UL (ref 0–0.5)
IMM GRANULOCYTES NFR BLD AUTO: 1 % (ref 0–5)
LYMPHOCYTES # BLD: 1.4 K/UL (ref 0.5–4.6)
LYMPHOCYTES NFR BLD: 16 % (ref 13–44)
MAGNESIUM SERPL-MCNC: 2.2 MG/DL (ref 1.8–2.4)
MCH RBC QN AUTO: 27 PG (ref 26.1–32.9)
MCHC RBC AUTO-ENTMCNC: 29.3 G/DL (ref 31.4–35)
MCV RBC AUTO: 92.2 FL (ref 82–102)
MONOCYTES # BLD: 1.6 K/UL (ref 0.1–1.3)
MONOCYTES NFR BLD: 18 % (ref 4–12)
NEUTS SEG # BLD: 5.6 K/UL (ref 1.7–8.2)
NEUTS SEG NFR BLD: 64 % (ref 43–78)
NRBC # BLD: 0 K/UL (ref 0–0.2)
PLATELET # BLD AUTO: 353 K/UL (ref 150–450)
PMV BLD AUTO: 10.4 FL (ref 9.4–12.3)
POTASSIUM SERPL-SCNC: 3.8 MMOL/L (ref 3.5–5.1)
PROT SERPL-MCNC: 5.3 G/DL (ref 6.3–8.2)
RBC # BLD AUTO: 2.96 M/UL (ref 4.05–5.2)
SODIUM SERPL-SCNC: 141 MMOL/L (ref 133–143)
TRIGL SERPL-MCNC: 106 MG/DL (ref 35–150)
WBC # BLD AUTO: 8.7 K/UL (ref 4.3–11.1)

## 2023-08-22 PROCEDURE — 36591 DRAW BLOOD OFF VENOUS DEVICE: CPT

## 2023-08-22 PROCEDURE — 2500000003 HC RX 250 WO HCPCS: Performed by: NURSE PRACTITIONER

## 2023-08-22 PROCEDURE — 6360000002 HC RX W HCPCS: Performed by: FAMILY MEDICINE

## 2023-08-22 PROCEDURE — 83735 ASSAY OF MAGNESIUM: CPT

## 2023-08-22 PROCEDURE — 80053 COMPREHEN METABOLIC PANEL: CPT

## 2023-08-22 PROCEDURE — 84478 ASSAY OF TRIGLYCERIDES: CPT

## 2023-08-22 PROCEDURE — 99239 HOSP IP/OBS DSCHRG MGMT >30: CPT | Performed by: INTERNAL MEDICINE

## 2023-08-22 PROCEDURE — A4216 STERILE WATER/SALINE, 10 ML: HCPCS | Performed by: FAMILY MEDICINE

## 2023-08-22 PROCEDURE — APPSS45 APP SPLIT SHARED TIME 31-45 MINUTES: Performed by: NURSE PRACTITIONER

## 2023-08-22 PROCEDURE — C9113 INJ PANTOPRAZOLE SODIUM, VIA: HCPCS | Performed by: FAMILY MEDICINE

## 2023-08-22 PROCEDURE — 6360000002 HC RX W HCPCS: Performed by: STUDENT IN AN ORGANIZED HEALTH CARE EDUCATION/TRAINING PROGRAM

## 2023-08-22 PROCEDURE — 2580000003 HC RX 258: Performed by: FAMILY MEDICINE

## 2023-08-22 PROCEDURE — 85025 COMPLETE CBC W/AUTO DIFF WBC: CPT

## 2023-08-22 PROCEDURE — 6370000000 HC RX 637 (ALT 250 FOR IP): Performed by: NURSE PRACTITIONER

## 2023-08-22 PROCEDURE — 6360000002 HC RX W HCPCS: Performed by: NURSE PRACTITIONER

## 2023-08-22 RX ORDER — OXYCODONE HCL 5 MG/5 ML
5-10 SOLUTION, ORAL ORAL EVERY 4 HOURS PRN
Qty: 120 ML | Refills: 0 | Status: ON HOLD | OUTPATIENT
Start: 2023-08-22 | End: 2023-08-29

## 2023-08-22 RX ORDER — FENTANYL 25 UG/1
1 PATCH TRANSDERMAL
Qty: 5 PATCH | Refills: 0 | Status: SHIPPED | OUTPATIENT
Start: 2023-08-24 | End: 2023-08-24 | Stop reason: SDUPTHER

## 2023-08-22 RX ORDER — FUROSEMIDE 20 MG/1
20 TABLET ORAL DAILY
Qty: 5 TABLET | Refills: 0 | Status: ON HOLD | OUTPATIENT
Start: 2023-08-22 | End: 2023-08-27

## 2023-08-22 RX ORDER — HEPARIN 100 UNIT/ML
300 SYRINGE INTRAVENOUS PRN
Status: CANCELLED | OUTPATIENT
Start: 2023-08-22

## 2023-08-22 RX ADMIN — PROCHLORPERAZINE EDISYLATE 10 MG: 5 INJECTION INTRAMUSCULAR; INTRAVENOUS at 11:48

## 2023-08-22 RX ADMIN — OXYCODONE HYDROCHLORIDE 5 MG: 5 SOLUTION ORAL at 11:36

## 2023-08-22 RX ADMIN — SODIUM CHLORIDE, PRESERVATIVE FREE 10 ML: 5 INJECTION INTRAVENOUS at 09:09

## 2023-08-22 RX ADMIN — ONDANSETRON 4 MG: 2 INJECTION INTRAMUSCULAR; INTRAVENOUS at 16:18

## 2023-08-22 RX ADMIN — ONDANSETRON 4 MG: 2 INJECTION INTRAMUSCULAR; INTRAVENOUS at 09:51

## 2023-08-22 RX ADMIN — SODIUM CHLORIDE, PRESERVATIVE FREE 40 MG: 5 INJECTION INTRAVENOUS at 09:08

## 2023-08-22 RX ADMIN — FUROSEMIDE 20 MG: 10 INJECTION, SOLUTION INTRAMUSCULAR; INTRAVENOUS at 09:08

## 2023-08-22 RX ADMIN — HYDROMORPHONE HYDROCHLORIDE 1 MG: 1 INJECTION, SOLUTION INTRAMUSCULAR; INTRAVENOUS; SUBCUTANEOUS at 03:28

## 2023-08-22 RX ADMIN — HYDROMORPHONE HYDROCHLORIDE 1 MG: 1 INJECTION, SOLUTION INTRAMUSCULAR; INTRAVENOUS; SUBCUTANEOUS at 13:18

## 2023-08-22 RX ADMIN — ONDANSETRON 4 MG: 2 INJECTION INTRAMUSCULAR; INTRAVENOUS at 03:28

## 2023-08-22 RX ADMIN — HYDROMORPHONE HYDROCHLORIDE 1 MG: 1 INJECTION, SOLUTION INTRAMUSCULAR; INTRAVENOUS; SUBCUTANEOUS at 09:08

## 2023-08-22 ASSESSMENT — PAIN DESCRIPTION - LOCATION
LOCATION: ABDOMEN

## 2023-08-22 ASSESSMENT — PAIN DESCRIPTION - ORIENTATION
ORIENTATION: LEFT;RIGHT;ANTERIOR
ORIENTATION: RIGHT;LEFT;MID
ORIENTATION: RIGHT

## 2023-08-22 ASSESSMENT — PAIN DESCRIPTION - DESCRIPTORS
DESCRIPTORS: DISCOMFORT
DESCRIPTORS: ACHING
DESCRIPTORS: ACHING

## 2023-08-22 ASSESSMENT — PAIN DESCRIPTION - ONSET: ONSET: GRADUAL

## 2023-08-22 ASSESSMENT — PAIN SCALES - GENERAL
PAINLEVEL_OUTOF10: 7
PAINLEVEL_OUTOF10: 3
PAINLEVEL_OUTOF10: 7

## 2023-08-22 ASSESSMENT — PAIN DESCRIPTION - FREQUENCY: FREQUENCY: CONTINUOUS

## 2023-08-22 ASSESSMENT — PAIN DESCRIPTION - DIRECTION: RADIATING_TOWARDS: NO

## 2023-08-22 ASSESSMENT — PAIN DESCRIPTION - PAIN TYPE: TYPE: CHRONIC PAIN

## 2023-08-22 NOTE — CARE COORDINATION
Pt to d/c home today. Family will provide transportation. FARRAH order sent to [facility]: SN, PT, OT. Orders sent to [facility] for resumption of home TPN and addition of Reglan. [place] with [facility] will meet with pt prior to d/c this afternoon. No other supportive care needs identified. Pt agrees with d/c plan. Milestones met. LOS = 8 days       08/15/23 0944   Service Assessment   Patient Orientation Alert and Oriented;Person;Place; Self   Cognition Alert   History Provided By Patient;Medical Record   Primary Caregiver Self   Accompanied By/Relationship N/A   Support Systems Spouse/Significant Other;Children;Family Members; Other (Comment)  ([facility]: SN)   Patient's [address] is: Legal Next of [address] [place] [address]   PCP Verified by CM Yes  Juaquin Serrano MD)   Last Visit to PCP Within last 3 months   Prior Functional Level Independent in ADLs/IADLs   Current Functional Level Independent in ADLs/IADLs   Can patient return to prior living arrangement Yes   Ability to make needs known: Good   Family able to assist with home care needs: Yes   Would you like for me to discuss the discharge plan with any other family members/significant others, and if so, who? No   Financial Resources Other (Comment)  (BCBS)   Community Resources None   Social/Functional History   Lives With Significant other;Family   Type of [address] [place] [address]  One level   Home Access Stairs to enter without rails   Entrance Stairs - Number of Steps 1   Bathroom Shower/Tub Walk-in shower   Bathroom Toilet Standard   [address] chair   [address] Feeding equipment  (Home TPN through [facility].   Shower Chair.)   [employer]  ([facility]: SN)   ADL Assistance Independent   Homemaking Assistance Independent   Ambulation Assistance Independent   Transfer Assistance Independent   Active  No   Patient's  Info Family   Mode of Transportation

## 2023-08-22 NOTE — DISCHARGE SUMMARY
[address] [facility] Hematology & Oncology: Inpatient Hematology / Oncology Discharge Summary Note    Patient ID:  Benito Angeles  [id number]  24 y.o.  1975    Admit Date: 8/14/2023    Discharge Date: 8/22/2023    Admission Diagnoses: Hematemesis [K92.0]  Other ascites [R18.8]  Bilateral pleural effusion [J90]  Hematemesis with nausea [K92.0]  Anemia, unspecified type [D64.9]    Discharge Diagnoses:  Principal Diagnosis: Hematemesis  Principal Problem:    Hematemesis  Active Problems:    Hydronephrosis of right kidney    Peritoneal carcinomatosis (HCC)    Intractable vomiting    Intractable vomiting with nausea    Severe protein-calorie malnutrition (HCC)    Peritoneal effusion    Bilateral pleural effusion    Other ascites  Resolved Problems:    * No resolved hospital problems. 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 CT findings as noted below. Hemoglobin dropped to 6.1 and she received 1 unit of PRBCs. She underwent EGD on 8/15/2023 which showed ulcerated fungating mass in the body of the stomach with suture material within the ulcer bed and esophagitis. She continued to require IV narcotic analgesics and has unresolved nausea. However, Reglan pump for OP use obtained. Pain improved after home fentanyl patch resumed. Will continue home TPN and arrange for [address] [place] [address],[address] PT/OT. Dr Mandie Donohue had several discussions regarding [address] [place] [address] and treatment options given new gastric mass and concern for POD. Not interested in hospice.  Treatment options limited due to inability

## 2023-08-23 ENCOUNTER — CARE COORDINATION (OUTPATIENT)
Dept: CARE COORDINATION | Facility: CLINIC | Age: 48
End: 2023-08-23

## 2023-08-23 DIAGNOSIS — C80.1 CARCINOMA OF UNKNOWN PRIMARY (HCC): Primary | ICD-10-CM

## 2023-08-23 NOTE — CARE COORDINATION
Care Transitions Outreach Attempt    Call within 2 business days of discharge: Yes   Attempted to reach patient for transitions of care follow up. Unable to reach patient. Patient: Elle Mejia Patient : 1975 MRN: [id number]    Last Discharge [address]       Date Complaint Diagnosis Description Type Department Provider    23 Chest Pain; Emesis Hematemesis with nausea . .. ED to Hosp-Admission (Discharged) (ADMITTED) Benji Aguilar MD; Cathleen Morrison. .. Was this an external facility discharge?  No Discharge Facility: [facility]    Noted following upcoming appointments from discharge chart review:   [facility] follow up appointment(s):   Future Appointments   Date Time Provider [address]   2023  7:00 AM PORT [facility]   2023  7:30 AM BOB Angel [facility] AMB   2023  8:00 AM Lenora An RD [facility] AMB   2023  8:15 AM INFUSION [facility]   2023  9:00 AM SCOTT Stern [facility] AMB   2023 11:30 AM PORT [facility]   2023 12:00 PM SCOTT Brown NP [facility] AMB   2023 12:45 PM INFUSION GCCOP[facility] [address]     Non-[facility] follow up appointment(s): [facility]/Dr. Tania Durand OP follow up regarding Reglan pump

## 2023-08-24 ENCOUNTER — HOSPITAL ENCOUNTER (OUTPATIENT)
Dept: INFUSION THERAPY | Age: 48
Discharge: HOME | End: 2023-08-24
Payer: COMMERCIAL

## 2023-08-24 ENCOUNTER — HOSPITAL ENCOUNTER (OUTPATIENT)
Dept: LAB | Age: 48
End: 2023-08-24
Payer: COMMERCIAL

## 2023-08-24 ENCOUNTER — OFFICE VISIT (OUTPATIENT)
Dept: ONCOLOGY | Age: 48
End: 2023-08-24

## 2023-08-24 ENCOUNTER — OFFICE VISIT (OUTPATIENT)
Dept: PALLATIVE CARE | Age: 48
End: 2023-08-24

## 2023-08-24 ENCOUNTER — OFFICE VISIT (OUTPATIENT)
Dept: ONCOLOGY | Age: 48
End: 2023-08-24
Payer: COMMERCIAL

## 2023-08-24 ENCOUNTER — CARE COORDINATION (OUTPATIENT)
Dept: CARE COORDINATION | Facility: CLINIC | Age: 48
End: 2023-08-24

## 2023-08-24 VITALS
BODY MASS INDEX: 24.62 KG/M2 | WEIGHT: 144.2 LBS | TEMPERATURE: 98 F | HEART RATE: 127 BPM | DIASTOLIC BLOOD PRESSURE: 87 MMHG | RESPIRATION RATE: 14 BRPM | SYSTOLIC BLOOD PRESSURE: 121 MMHG | OXYGEN SATURATION: 98 % | HEIGHT: 64 IN

## 2023-08-24 DIAGNOSIS — C78.6 METASTASIS TO PERITONEUM OF UNKNOWN PRIMARY (HCC): Primary | ICD-10-CM

## 2023-08-24 DIAGNOSIS — C78.6 METASTASIS TO PERITONEUM OF UNKNOWN PRIMARY (HCC): ICD-10-CM

## 2023-08-24 DIAGNOSIS — Z00.8 NUTRITIONAL ASSESSMENT: Primary | ICD-10-CM

## 2023-08-24 DIAGNOSIS — C80.1 CARCINOMA OF UNKNOWN PRIMARY (HCC): ICD-10-CM

## 2023-08-24 DIAGNOSIS — G89.3 CANCER ASSOCIATED PAIN: Primary | ICD-10-CM

## 2023-08-24 DIAGNOSIS — R53.0 NEOPLASTIC MALIGNANT RELATED FATIGUE: ICD-10-CM

## 2023-08-24 DIAGNOSIS — C80.1 METASTASIS TO PERITONEUM OF UNKNOWN PRIMARY (HCC): ICD-10-CM

## 2023-08-24 DIAGNOSIS — G89.3 CANCER ASSOCIATED PAIN: ICD-10-CM

## 2023-08-24 DIAGNOSIS — Z78.9 ON TOTAL PARENTERAL NUTRITION (TPN): ICD-10-CM

## 2023-08-24 DIAGNOSIS — Z51.5 ENCOUNTER FOR PALLIATIVE CARE: Primary | ICD-10-CM

## 2023-08-24 DIAGNOSIS — G89.3 CANCER RELATED PAIN: ICD-10-CM

## 2023-08-24 DIAGNOSIS — C80.1 METASTASIS TO PERITONEUM OF UNKNOWN PRIMARY (HCC): Primary | ICD-10-CM

## 2023-08-24 DIAGNOSIS — R10.84 GENERALIZED ABDOMINAL PAIN: ICD-10-CM

## 2023-08-24 DIAGNOSIS — R11.2 INTRACTABLE NAUSEA AND VOMITING: ICD-10-CM

## 2023-08-24 DIAGNOSIS — R11.2 NAUSEA AND VOMITING, UNSPECIFIED VOMITING TYPE: ICD-10-CM

## 2023-08-24 LAB
ALBUMIN SERPL-MCNC: 2.1 G/DL (ref 3.5–5)
ALBUMIN/GLOB SERPL: 0.5 (ref 0.4–1.6)
ALP SERPL-CCNC: 287 U/L (ref 50–136)
ALT SERPL-CCNC: 32 U/L (ref 12–65)
ANION GAP SERPL CALC-SCNC: 6 MMOL/L (ref 2–11)
AST SERPL-CCNC: 22 U/L (ref 15–37)
BASOPHILS # BLD: 0 K/UL (ref 0–0.2)
BASOPHILS NFR BLD: 0 % (ref 0–2)
BILIRUB SERPL-MCNC: 0.4 MG/DL (ref 0.2–1.1)
BUN SERPL-MCNC: 21 MG/DL (ref 6–23)
CALCIUM SERPL-MCNC: 9.2 MG/DL (ref 8.3–10.4)
CHLORIDE SERPL-SCNC: 106 MMOL/L (ref 101–110)
CO2 SERPL-SCNC: 29 MMOL/L (ref 21–32)
CREAT SERPL-MCNC: 0.6 MG/DL (ref 0.6–1)
DIFFERENTIAL METHOD BLD: ABNORMAL
EOSINOPHIL # BLD: 0 K/UL (ref 0–0.8)
EOSINOPHIL NFR BLD: 0 % (ref 0.5–7.8)
ERYTHROCYTE [DISTWIDTH] IN BLOOD BY AUTOMATED COUNT: 19 % (ref 11.9–14.6)
GLOBULIN SER CALC-MCNC: 4.6 G/DL (ref 2.8–4.5)
GLUCOSE SERPL-MCNC: 136 MG/DL (ref 65–100)
HCG SERPL QL: NEGATIVE
HCT VFR BLD AUTO: 34.4 % (ref 35.8–46.3)
HGB BLD-MCNC: 10.1 G/DL (ref 11.7–15.4)
IMM GRANULOCYTES # BLD AUTO: 0.1 K/UL (ref 0–0.5)
IMM GRANULOCYTES NFR BLD AUTO: 1 % (ref 0–5)
LYMPHOCYTES # BLD: 1.1 K/UL (ref 0.5–4.6)
LYMPHOCYTES NFR BLD: 8 % (ref 13–44)
MAGNESIUM SERPL-MCNC: 2.3 MG/DL (ref 1.8–2.4)
MCH RBC QN AUTO: 26.5 PG (ref 26.1–32.9)
MCHC RBC AUTO-ENTMCNC: 29.4 G/DL (ref 31.4–35)
MCV RBC AUTO: 90.3 FL (ref 82–102)
MONOCYTES # BLD: 1.5 K/UL (ref 0.1–1.3)
MONOCYTES NFR BLD: 10 % (ref 4–12)
NEUTS SEG # BLD: 11.4 K/UL (ref 1.7–8.2)
NEUTS SEG NFR BLD: 81 % (ref 43–78)
NRBC # BLD: 0.04 K/UL (ref 0–0.2)
PHOSPHATE SERPL-MCNC: 3 MG/DL (ref 2.5–4.5)
PLATELET # BLD AUTO: 477 K/UL (ref 150–450)
PMV BLD AUTO: 10 FL (ref 9.4–12.3)
POTASSIUM SERPL-SCNC: 3.2 MMOL/L (ref 3.5–5.1)
PROT SERPL-MCNC: 6.7 G/DL (ref 6.3–8.2)
RBC # BLD AUTO: 3.81 M/UL (ref 4.05–5.2)
SODIUM SERPL-SCNC: 141 MMOL/L (ref 133–143)
WBC # BLD AUTO: 14.1 K/UL (ref 4.3–11.1)

## 2023-08-24 PROCEDURE — 84100 ASSAY OF PHOSPHORUS: CPT

## 2023-08-24 PROCEDURE — 84703 CHORIONIC GONADOTROPIN ASSAY: CPT

## 2023-08-24 PROCEDURE — 2580000003 HC RX 258: Performed by: NURSE PRACTITIONER

## 2023-08-24 PROCEDURE — 85025 COMPLETE CBC W/AUTO DIFF WBC: CPT

## 2023-08-24 PROCEDURE — 96375 TX/PRO/DX INJ NEW DRUG ADDON: CPT

## 2023-08-24 PROCEDURE — 99214 OFFICE O/P EST MOD 30 MIN: CPT | Performed by: NURSE PRACTITIONER

## 2023-08-24 PROCEDURE — 6360000002 HC RX W HCPCS: Performed by: NURSE PRACTITIONER

## 2023-08-24 PROCEDURE — 83735 ASSAY OF MAGNESIUM: CPT

## 2023-08-24 PROCEDURE — A4216 STERILE WATER/SALINE, 10 ML: HCPCS | Performed by: NURSE PRACTITIONER

## 2023-08-24 PROCEDURE — 2500000003 HC RX 250 WO HCPCS: Performed by: NURSE PRACTITIONER

## 2023-08-24 PROCEDURE — 36591 DRAW BLOOD OFF VENOUS DEVICE: CPT

## 2023-08-24 PROCEDURE — 96413 CHEMO IV INFUSION 1 HR: CPT

## 2023-08-24 PROCEDURE — 6360000002 HC RX W HCPCS: Performed by: INTERNAL MEDICINE

## 2023-08-24 PROCEDURE — 2580000003 HC RX 258: Performed by: INTERNAL MEDICINE

## 2023-08-24 PROCEDURE — 80053 COMPREHEN METABOLIC PANEL: CPT

## 2023-08-24 RX ORDER — SODIUM CHLORIDE 0.9 % (FLUSH) 0.9 %
5-40 SYRINGE (ML) INJECTION 2 TIMES DAILY
Status: DISCONTINUED | OUTPATIENT
Start: 2023-08-24 | End: 2023-08-28 | Stop reason: HOSPADM

## 2023-08-24 RX ORDER — ONDANSETRON 2 MG/ML
8 INJECTION INTRAMUSCULAR; INTRAVENOUS ONCE
Status: CANCELLED | OUTPATIENT
Start: 2023-08-24 | End: 2023-08-24

## 2023-08-24 RX ORDER — MEPERIDINE HYDROCHLORIDE 25 MG/ML
12.5 INJECTION INTRAMUSCULAR; INTRAVENOUS; SUBCUTANEOUS PRN
Status: DISCONTINUED | OUTPATIENT
Start: 2023-08-24 | End: 2023-08-25 | Stop reason: HOSPADM

## 2023-08-24 RX ORDER — EPINEPHRINE 1 MG/ML
0.3 INJECTION, SOLUTION, CONCENTRATE INTRAVENOUS PRN
Status: CANCELLED | OUTPATIENT
Start: 2023-08-24

## 2023-08-24 RX ORDER — FAMOTIDINE 10 MG/ML
20 INJECTION, SOLUTION INTRAVENOUS
Status: CANCELLED | OUTPATIENT
Start: 2023-08-24

## 2023-08-24 RX ORDER — SODIUM CHLORIDE 9 MG/ML
5-250 INJECTION, SOLUTION INTRAVENOUS PRN
Status: CANCELLED | OUTPATIENT
Start: 2023-08-24

## 2023-08-24 RX ORDER — DIPHENHYDRAMINE HYDROCHLORIDE 50 MG/ML
50 INJECTION INTRAMUSCULAR; INTRAVENOUS
Status: CANCELLED | OUTPATIENT
Start: 2023-08-24

## 2023-08-24 RX ORDER — ONDANSETRON 2 MG/ML
8 INJECTION INTRAMUSCULAR; INTRAVENOUS
Status: DISCONTINUED | OUTPATIENT
Start: 2023-08-24 | End: 2023-08-25 | Stop reason: HOSPADM

## 2023-08-24 RX ORDER — ACETAMINOPHEN 325 MG/1
650 TABLET ORAL
Status: DISCONTINUED | OUTPATIENT
Start: 2023-08-24 | End: 2023-08-25 | Stop reason: HOSPADM

## 2023-08-24 RX ORDER — SODIUM CHLORIDE 0.9 % (FLUSH) 0.9 %
5-40 SYRINGE (ML) INJECTION PRN
Status: CANCELLED | OUTPATIENT
Start: 2023-08-24

## 2023-08-24 RX ORDER — MEPERIDINE HYDROCHLORIDE 50 MG/ML
12.5 INJECTION INTRAMUSCULAR; INTRAVENOUS; SUBCUTANEOUS PRN
Status: CANCELLED | OUTPATIENT
Start: 2023-08-24

## 2023-08-24 RX ORDER — DIPHENHYDRAMINE HYDROCHLORIDE 50 MG/ML
50 INJECTION INTRAMUSCULAR; INTRAVENOUS ONCE
Status: COMPLETED | OUTPATIENT
Start: 2023-08-24 | End: 2023-08-24

## 2023-08-24 RX ORDER — SODIUM CHLORIDE 9 MG/ML
INJECTION, SOLUTION INTRAVENOUS CONTINUOUS
Status: CANCELLED | OUTPATIENT
Start: 2023-08-24

## 2023-08-24 RX ORDER — HEPARIN SODIUM (PORCINE) LOCK FLUSH IV SOLN 100 UNIT/ML 100 UNIT/ML
500 SOLUTION INTRAVENOUS PRN
Status: CANCELLED | OUTPATIENT
Start: 2023-08-24

## 2023-08-24 RX ORDER — FAMOTIDINE 10 MG/ML
20 INJECTION, SOLUTION INTRAVENOUS ONCE
Status: CANCELLED | OUTPATIENT
Start: 2023-08-24 | End: 2023-08-24

## 2023-08-24 RX ORDER — ALBUTEROL SULFATE 90 UG/1
4 AEROSOL, METERED RESPIRATORY (INHALATION) PRN
Status: DISCONTINUED | OUTPATIENT
Start: 2023-08-24 | End: 2023-08-25 | Stop reason: HOSPADM

## 2023-08-24 RX ORDER — ONDANSETRON 2 MG/ML
8 INJECTION INTRAMUSCULAR; INTRAVENOUS ONCE
Status: COMPLETED | OUTPATIENT
Start: 2023-08-24 | End: 2023-08-24

## 2023-08-24 RX ORDER — EPINEPHRINE 1 MG/ML
0.3 INJECTION, SOLUTION, CONCENTRATE INTRAVENOUS PRN
Status: DISCONTINUED | OUTPATIENT
Start: 2023-08-24 | End: 2023-08-25 | Stop reason: HOSPADM

## 2023-08-24 RX ORDER — DEXAMETHASONE SODIUM PHOSPHATE 10 MG/ML
10 INJECTION INTRAMUSCULAR; INTRAVENOUS ONCE
Status: COMPLETED | OUTPATIENT
Start: 2023-08-24 | End: 2023-08-24

## 2023-08-24 RX ORDER — FENTANYL 25 UG/1
1 PATCH TRANSDERMAL
Qty: 10 PATCH | Refills: 0 | Status: ON HOLD | OUTPATIENT
Start: 2023-08-24 | End: 2023-09-23

## 2023-08-24 RX ORDER — MORPHINE SULFATE 2 MG/ML
2 INJECTION, SOLUTION INTRAMUSCULAR; INTRAVENOUS EVERY 4 HOURS PRN
Status: DISCONTINUED | OUTPATIENT
Start: 2023-08-24 | End: 2023-08-25 | Stop reason: HOSPADM

## 2023-08-24 RX ORDER — ACETAMINOPHEN 325 MG/1
650 TABLET ORAL
Status: CANCELLED | OUTPATIENT
Start: 2023-08-24

## 2023-08-24 RX ORDER — SODIUM CHLORIDE 0.9 % (FLUSH) 0.9 %
5-40 SYRINGE (ML) INJECTION PRN
Status: DISCONTINUED | OUTPATIENT
Start: 2023-08-24 | End: 2023-08-25 | Stop reason: HOSPADM

## 2023-08-24 RX ORDER — MORPHINE SULFATE 2 MG/ML
2 INJECTION, SOLUTION INTRAMUSCULAR; INTRAVENOUS EVERY 4 HOURS PRN
Status: CANCELLED
Start: 2023-08-24

## 2023-08-24 RX ORDER — SODIUM CHLORIDE 9 MG/ML
5-250 INJECTION, SOLUTION INTRAVENOUS PRN
Status: DISCONTINUED | OUTPATIENT
Start: 2023-08-24 | End: 2023-08-25 | Stop reason: HOSPADM

## 2023-08-24 RX ORDER — ALBUTEROL SULFATE 90 UG/1
4 AEROSOL, METERED RESPIRATORY (INHALATION) PRN
Status: CANCELLED | OUTPATIENT
Start: 2023-08-24

## 2023-08-24 RX ORDER — SODIUM CHLORIDE 9 MG/ML
INJECTION, SOLUTION INTRAVENOUS CONTINUOUS
Status: DISCONTINUED | OUTPATIENT
Start: 2023-08-24 | End: 2023-08-25 | Stop reason: HOSPADM

## 2023-08-24 RX ORDER — ONDANSETRON 2 MG/ML
8 INJECTION INTRAMUSCULAR; INTRAVENOUS
Status: CANCELLED | OUTPATIENT
Start: 2023-08-24

## 2023-08-24 RX ORDER — DIPHENHYDRAMINE HYDROCHLORIDE 50 MG/ML
50 INJECTION INTRAMUSCULAR; INTRAVENOUS ONCE
Status: CANCELLED | OUTPATIENT
Start: 2023-08-24 | End: 2023-08-24

## 2023-08-24 RX ORDER — DIPHENHYDRAMINE HYDROCHLORIDE 50 MG/ML
50 INJECTION INTRAMUSCULAR; INTRAVENOUS
Status: DISCONTINUED | OUTPATIENT
Start: 2023-08-24 | End: 2023-08-25 | Stop reason: HOSPADM

## 2023-08-24 RX ORDER — HEPARIN 100 UNIT/ML
500 SYRINGE INTRAVENOUS PRN
Status: DISCONTINUED | OUTPATIENT
Start: 2023-08-24 | End: 2023-08-28 | Stop reason: HOSPADM

## 2023-08-24 RX ADMIN — SODIUM CHLORIDE 20 ML/HR: 9 INJECTION, SOLUTION INTRAVENOUS at 09:15

## 2023-08-24 RX ADMIN — SODIUM CHLORIDE, PRESERVATIVE FREE 10 ML: 5 INJECTION INTRAVENOUS at 11:35

## 2023-08-24 RX ADMIN — ONDANSETRON 8 MG: 2 INJECTION INTRAMUSCULAR; INTRAVENOUS at 09:31

## 2023-08-24 RX ADMIN — PACLITAXEL 108 MG: 6 INJECTION, SOLUTION, CONCENTRATE INTRAVENOUS at 10:16

## 2023-08-24 RX ADMIN — SODIUM CHLORIDE, PRESERVATIVE FREE 10 ML: 5 INJECTION INTRAVENOUS at 07:36

## 2023-08-24 RX ADMIN — DEXAMETHASONE SODIUM PHOSPHATE 10 MG: 10 INJECTION INTRAMUSCULAR; INTRAVENOUS at 09:40

## 2023-08-24 RX ADMIN — DIPHENHYDRAMINE HYDROCHLORIDE 50 MG: 50 INJECTION INTRAMUSCULAR; INTRAVENOUS at 09:37

## 2023-08-24 RX ADMIN — MORPHINE SULFATE 2 MG: 2 INJECTION, SOLUTION INTRAMUSCULAR; INTRAVENOUS at 09:43

## 2023-08-24 RX ADMIN — HEPARIN 500 UNITS: 100 SYRINGE at 07:43

## 2023-08-24 RX ADMIN — FAMOTIDINE 20 MG: 10 INJECTION INTRAVENOUS at 09:34

## 2023-08-24 RX ADMIN — SODIUM CHLORIDE, PRESERVATIVE FREE 10 ML: 5 INJECTION INTRAVENOUS at 09:15

## 2023-08-24 ASSESSMENT — PAIN DESCRIPTION - PAIN TYPE: TYPE: CHRONIC PAIN

## 2023-08-24 ASSESSMENT — PAIN DESCRIPTION - DESCRIPTORS
DESCRIPTORS: ACHING
DESCRIPTORS: ACHING

## 2023-08-24 ASSESSMENT — ENCOUNTER SYMPTOMS
VOMITING: 1
EYES NEGATIVE: 1
ABDOMINAL PAIN: 1
NAUSEA: 0
ALLERGIC/IMMUNOLOGIC NEGATIVE: 1
RESPIRATORY NEGATIVE: 1

## 2023-08-24 ASSESSMENT — PAIN SCALES - GENERAL
PAINLEVEL_OUTOF10: 7
PAINLEVEL_OUTOF10: 7
PAINLEVEL_OUTOF10: 3

## 2023-08-24 ASSESSMENT — PAIN DESCRIPTION - LOCATION
LOCATION: ABDOMEN
LOCATION: ABDOMEN

## 2023-08-24 ASSESSMENT — PAIN DESCRIPTION - FREQUENCY: FREQUENCY: CONTINUOUS

## 2023-08-24 ASSESSMENT — PATIENT HEALTH QUESTIONNAIRE - PHQ9
SUM OF ALL RESPONSES TO PHQ QUESTIONS 1-9: 0
1. LITTLE INTEREST OR PLEASURE IN DOING THINGS: 0
SUM OF ALL RESPONSES TO PHQ9 QUESTIONS 1 & 2: 0
SUM OF ALL RESPONSES TO PHQ QUESTIONS 1-9: 0
2. FEELING DOWN, DEPRESSED OR HOPELESS: 0
SUM OF ALL RESPONSES TO PHQ QUESTIONS 1-9: 0
SUM OF ALL RESPONSES TO PHQ QUESTIONS 1-9: 0

## 2023-08-24 ASSESSMENT — PAIN DESCRIPTION - ORIENTATION
ORIENTATION: MID
ORIENTATION: MID

## 2023-08-24 ASSESSMENT — PAIN DESCRIPTION - ONSET: ONSET: ON-GOING

## 2023-08-24 ASSESSMENT — PAIN - FUNCTIONAL ASSESSMENT: PAIN_FUNCTIONAL_ASSESSMENT: PREVENTS OR INTERFERES SOME ACTIVE ACTIVITIES AND ADLS

## 2023-08-24 NOTE — CARE COORDINATION
[facility] Care Transitions Initial Follow Up Call    Call within 2 business days of discharge: Yes    Patient Current Location:  Home: [address] [place] [address]    Care Transition Nurse contacted the patient by telephone to perform post hospital discharge assessment. Verified name and  with patient as identifiers. Provided introduction to self, and explanation of the Care Transition Nurse role. Patient: Juan Alberto Escoto Patient : 1975   MRN: [id number]  Reason for Admission: Hematemesis nausea/Metastases to peritoneum  Discharge Date: 23 RARS: Readmission Risk Score: 31.7      Last Discharge [address] [facility]       Date Complaint Diagnosis Description Type Department Provider    23 Chest Pain; Emesis Hematemesis with nausea . .. ED to Hosp-Admission (Discharged) (ADMITTED) Kraig Napoles MD; Socrates Taylor. .. Unable to do complete ARCHANA assessment due to patient being at Oncology appointments/infusion. Patient with Palliative Care with discussion for a possible transition to hospice. Patient to contact with tera santana. Engaged with [address] [facility], Oncology Palliative and Navigator. Was this an external facility discharge?  No Discharge Facility: [facility]      Follow Up  Future Appointments   Date Time Provider [address]   2023 11:30 AM PORT [facility]   2023 12:00 PM SCOTT Hilliard - NP [facility] AMB   2023 12:45 PM INFUSION [facility]   2023  7:00 AM PORT [facility]   2023  7:30 AM BOB Adamson [facility] AMB   2023  9:00 AM PORT Molinda Fast [facility]   2023  9:30 AM Davida Briones MD [facility] AMB         Lela Ceja, TRI

## 2023-08-24 NOTE — PROGRESS NOTES
Patient arrived to port lab for port access and lab draw. Port accessed and sluggish blood return. *Port remains accessed.  Patient discharged from port lab in wheelchair*

## 2023-08-24 NOTE — PROGRESS NOTES
[facility] Hematology and Oncology: Office Visit Established Patient    Reason for follow up:    Franca Bishop  is seen in follow-up for carcinoma of unknown primary. Overview: (copied from prior)  Ms. Sandrita Shea was seen for the first time in our office in February 2022. She was a woman of previously good health who began noticing left-sided back discomfort in early January 2022. This was associated  ultimately with a sense of difficulty swallowing and ultimately she presented to [facility] for evaluation. Her symptoms were felt to potentially be indicative of a bowel obstruction and she was sent for a CT scan. This study, performed on January 27, 2022  was notable for a linear calcific density along the course of the proximal left ureter with associated moderate hydronephrosis potentially indicative of an intraureteral calculus. There was also stranding throughout the retroperitoneal soft tissues anterior  to the left psoas muscle with pelvic ascites. There was also wall thickening involving the descending colon. The question of colitis or serosal implants was raised. The patient had undergone a gastric sleeve placement several years prior. She had  also undergone a negative colonoscopy about 3 years prior to these presentations. There was a history of anemia ultimately resulting in the performance of a hysterectomy approximately 3 years ago for menorrhagia. Because of the CT scan findings, she  was ultimately referred to Dr. Ketty Carrero for evaluation of a possible pelvic malignancy. On February 1, 2022 he performed a laparoscopy and biopsy with evidence of peritoneal carcinomatosis grossly. A \"peritoneal nodule\" and a \"peritoneal implant\"  were both positive for a poorly differentiated metastatic carcinoma potentially consistent with an upper gastrointestinal primary. An omental biopsy showed no evidence of malignancy.   Immunohistochemistries were positive for cytokeratin 7 and negative  for

## 2023-08-24 NOTE — PROGRESS NOTES
Arrived to the [address] [place] [address]. Assessment completed, labs reviewed. Taxol completed. Patient tolerated without problems. Morphine given for pain with patient verbalizing relief from pain  Any issues or concerns during appointment: Heart rate 125 -[facility] NP made aware and instructed to proceed with treatment  Patient instructed to call provider with temperature of 100.4 or greater or nausea/vomiting/ diarrhea or pain not controlled by medications  Instructed to call Dr Chris Grove with any side effects or other concerns  Patient aware of next infusion appointment on 8/31/23(date) at [phone]  (time). Discharged via W/C with family.  Needle left in port for TPN at home

## 2023-08-25 ENCOUNTER — TELEPHONE (OUTPATIENT)
Dept: RADIATION ONCOLOGY | Age: 48
End: 2023-08-25

## 2023-08-25 DIAGNOSIS — R11.10 INTRACTABLE VOMITING: Primary | ICD-10-CM

## 2023-08-25 RX ORDER — PROMETHAZINE HYDROCHLORIDE 25 MG/1
25 SUPPOSITORY RECTAL EVERY 6 HOURS PRN
Qty: 30 SUPPOSITORY | Refills: 0 | Status: ON HOLD | OUTPATIENT
Start: 2023-08-25

## 2023-08-25 RX ORDER — PROMETHAZINE HYDROCHLORIDE 25 MG/1
25 SUPPOSITORY RECTAL EVERY 6 HOURS PRN
Qty: 30 SUPPOSITORY | Refills: 0 | Status: SHIPPED | OUTPATIENT
Start: 2023-08-25 | End: 2023-08-25 | Stop reason: SDUPTHER

## 2023-08-25 NOTE — TELEPHONE ENCOUNTER
Call from [facility] [phone]. Pt is home from hospital & needs an IV pump for nausea & vomiting, she needs an order stating she can show the pt how to us it. Please call asap. Kasia Hillman

## 2023-08-25 NOTE — TELEPHONE ENCOUNTER
Spoke with Cori Don NP. Labs reviewed. Phenergan UT prn ordered fpr administration until next OV. Reviewed administration with patient. Instructed patient to use lubricant liberally when administering. Informed to notify clinic for any bleeding or signs of infection. Confirmed patient and/or family member able to administer rectally. Confirmed patient's pharmacy preference. Informed patient prescription sent. Instructed patient to present to ER for unrelieved nausea, vomiting and/or fever. All questions addressed to patient satisfaction. Patient verbalized understanding of all instructions.

## 2023-08-25 NOTE — TELEPHONE ENCOUNTER
Subjective    Chief Complaint: Uncontrolled vomiting  Details of Complaint: Home health calling. Patient was discharged home with Reglan pump. Per home health nurse, pump is ineffective. Requesting can we give an order for Zofran injections and teach the patient how to administer.     Objective    Date of last Office Visit: 23   Date of last labs: 23   Date of last imagin23  Date of last treatment, if applicable:23      Plan/Intervention    Proposed Plan:  Patient verbalized understanding of plan: YES/NO: Yes  Patient voiced intended compliance with plan: Verbalized/ Refused: Verbalized intended compliance

## 2023-08-26 ENCOUNTER — CLINICAL DOCUMENTATION (OUTPATIENT)
Dept: ONCOLOGY | Age: 48
End: 2023-08-26

## 2023-08-26 NOTE — PROGRESS NOTES
PA Approval has been received from [employer] for Fentanyl 25 mcg/hr Patches. Approved Case Number: [id number]  Validity Dates: 8/26/23 - 8/26/24    Email sent to [facility] Pharmacist notifying of the approval status.

## 2023-08-26 NOTE — PROGRESS NOTES
Prior authorization request for Fentanyl Patches received through [facility] under Key Code [id number]. PA request initially received under Jade Moss's name and NPI; however, [facility] could not locate the request.  Rx listing was reviewed. A second Rx was written on 8/24/23 for 10 patches per 30 day supply under Cristóbal Barry NP. PA request was submitted under PORSHA Santos's name and [facility] was able to locate the provider and request.  Patient demographics and clinical information submitted through the portal and sent to [employer] for medical review. The most recent oncology and palliative care office visit notes and copy of the Rx written by Peterson Azar NP uploaded to the portal for medical review. Pending PA Case ID [id number].

## 2023-08-27 ENCOUNTER — APPOINTMENT (OUTPATIENT)
Dept: CT IMAGING | Age: 48
DRG: 378 | End: 2023-08-27
Payer: COMMERCIAL

## 2023-08-27 ENCOUNTER — HOSPITAL ENCOUNTER (INPATIENT)
Age: 48
LOS: 8 days | DRG: 378 | End: 2023-09-04
Attending: EMERGENCY MEDICINE | Admitting: FAMILY MEDICINE
Payer: COMMERCIAL

## 2023-08-27 DIAGNOSIS — K92.0 HEMATEMESIS WITH NAUSEA: ICD-10-CM

## 2023-08-27 DIAGNOSIS — G89.3 CHRONIC PAIN DUE TO NEOPLASM: ICD-10-CM

## 2023-08-27 DIAGNOSIS — D72.829 LEUKOCYTOSIS, UNSPECIFIED TYPE: ICD-10-CM

## 2023-08-27 DIAGNOSIS — C79.9 METASTATIC CARCINOMA (HCC): Primary | ICD-10-CM

## 2023-08-27 DIAGNOSIS — R00.0 TACHYCARDIA: ICD-10-CM

## 2023-08-27 LAB
ALBUMIN SERPL-MCNC: 1.8 G/DL (ref 3.5–5)
ALBUMIN/GLOB SERPL: 0.5 (ref 0.4–1.6)
ALP SERPL-CCNC: 231 U/L (ref 50–136)
ALT SERPL-CCNC: 48 U/L (ref 12–65)
ANION GAP SERPL CALC-SCNC: 4 MMOL/L (ref 2–11)
AST SERPL-CCNC: 46 U/L (ref 15–37)
BASOPHILS # BLD: 0 K/UL (ref 0–0.2)
BASOPHILS NFR BLD: 0 % (ref 0–2)
BILIRUB SERPL-MCNC: 0.3 MG/DL (ref 0.2–1.1)
BUN SERPL-MCNC: 22 MG/DL (ref 6–23)
CALCIUM SERPL-MCNC: 8.4 MG/DL (ref 8.3–10.4)
CHLORIDE SERPL-SCNC: 112 MMOL/L (ref 101–110)
CO2 SERPL-SCNC: 34 MMOL/L (ref 21–32)
CREAT SERPL-MCNC: 0.5 MG/DL (ref 0.6–1)
DIFFERENTIAL METHOD BLD: ABNORMAL
EOSINOPHIL # BLD: 0 K/UL (ref 0–0.8)
EOSINOPHIL NFR BLD: 0 % (ref 0.5–7.8)
ERYTHROCYTE [DISTWIDTH] IN BLOOD BY AUTOMATED COUNT: 18.9 % (ref 11.9–14.6)
GLOBULIN SER CALC-MCNC: 3.5 G/DL (ref 2.8–4.5)
GLUCOSE SERPL-MCNC: 151 MG/DL (ref 65–100)
HCT VFR BLD AUTO: 26.4 % (ref 35.8–46.3)
HCT VFR BLD AUTO: 27.3 % (ref 35.8–46.3)
HGB BLD-MCNC: 7.6 G/DL (ref 11.7–15.4)
HGB BLD-MCNC: 8 G/DL (ref 11.7–15.4)
IMM GRANULOCYTES # BLD AUTO: 0.1 K/UL (ref 0–0.5)
IMM GRANULOCYTES NFR BLD AUTO: 0 % (ref 0–5)
INR PPP: 1.3
LYMPHOCYTES # BLD: 1.3 K/UL (ref 0.5–4.6)
LYMPHOCYTES NFR BLD: 7 % (ref 13–44)
MCH RBC QN AUTO: 26.9 PG (ref 26.1–32.9)
MCHC RBC AUTO-ENTMCNC: 29.3 G/DL (ref 31.4–35)
MCV RBC AUTO: 91.9 FL (ref 82–102)
MONOCYTES # BLD: 0.3 K/UL (ref 0.1–1.3)
MONOCYTES NFR BLD: 1 % (ref 4–12)
NEUTS SEG # BLD: 16.6 K/UL (ref 1.7–8.2)
NEUTS SEG NFR BLD: 91 % (ref 43–78)
NRBC # BLD: 0.03 K/UL (ref 0–0.2)
PLATELET # BLD AUTO: 308 K/UL (ref 150–450)
PMV BLD AUTO: 10.4 FL (ref 9.4–12.3)
POTASSIUM SERPL-SCNC: 3.5 MMOL/L (ref 3.5–5.1)
PROCALCITONIN SERPL-MCNC: 0.11 NG/ML (ref 0–0.49)
PROT SERPL-MCNC: 5.3 G/DL (ref 6.3–8.2)
PROTHROMBIN TIME: 16.5 SEC (ref 12.6–14.3)
RBC # BLD AUTO: 2.97 M/UL (ref 4.05–5.2)
SODIUM SERPL-SCNC: 150 MMOL/L (ref 133–143)
WBC # BLD AUTO: 18.2 K/UL (ref 4.3–11.1)

## 2023-08-27 PROCEDURE — 2500000003 HC RX 250 WO HCPCS: Performed by: EMERGENCY MEDICINE

## 2023-08-27 PROCEDURE — 2580000003 HC RX 258: Performed by: EMERGENCY MEDICINE

## 2023-08-27 PROCEDURE — 86923 COMPATIBILITY TEST ELECTRIC: CPT

## 2023-08-27 PROCEDURE — 96374 THER/PROPH/DIAG INJ IV PUSH: CPT

## 2023-08-27 PROCEDURE — 86850 RBC ANTIBODY SCREEN: CPT

## 2023-08-27 PROCEDURE — 86901 BLOOD TYPING SEROLOGIC RH(D): CPT

## 2023-08-27 PROCEDURE — 6360000002 HC RX W HCPCS: Performed by: EMERGENCY MEDICINE

## 2023-08-27 PROCEDURE — 36591 DRAW BLOOD OFF VENOUS DEVICE: CPT

## 2023-08-27 PROCEDURE — 83605 ASSAY OF LACTIC ACID: CPT

## 2023-08-27 PROCEDURE — 96375 TX/PRO/DX INJ NEW DRUG ADDON: CPT

## 2023-08-27 PROCEDURE — 84145 PROCALCITONIN (PCT): CPT

## 2023-08-27 PROCEDURE — 1100000003 HC PRIVATE W/ TELEMETRY

## 2023-08-27 PROCEDURE — C9113 INJ PANTOPRAZOLE SODIUM, VIA: HCPCS | Performed by: EMERGENCY MEDICINE

## 2023-08-27 PROCEDURE — 6360000002 HC RX W HCPCS: Performed by: FAMILY MEDICINE

## 2023-08-27 PROCEDURE — 6360000004 HC RX CONTRAST MEDICATION: Performed by: EMERGENCY MEDICINE

## 2023-08-27 PROCEDURE — 2580000003 HC RX 258: Performed by: FAMILY MEDICINE

## 2023-08-27 PROCEDURE — 74177 CT ABD & PELVIS W/CONTRAST: CPT

## 2023-08-27 PROCEDURE — 85014 HEMATOCRIT: CPT

## 2023-08-27 PROCEDURE — 86900 BLOOD TYPING SEROLOGIC ABO: CPT

## 2023-08-27 PROCEDURE — 85610 PROTHROMBIN TIME: CPT

## 2023-08-27 PROCEDURE — 80053 COMPREHEN METABOLIC PANEL: CPT

## 2023-08-27 PROCEDURE — A4216 STERILE WATER/SALINE, 10 ML: HCPCS | Performed by: EMERGENCY MEDICINE

## 2023-08-27 PROCEDURE — 87040 BLOOD CULTURE FOR BACTERIA: CPT

## 2023-08-27 PROCEDURE — 99285 EMERGENCY DEPT VISIT HI MDM: CPT

## 2023-08-27 PROCEDURE — 2500000003 HC RX 250 WO HCPCS: Performed by: FAMILY MEDICINE

## 2023-08-27 PROCEDURE — 36415 COLL VENOUS BLD VENIPUNCTURE: CPT

## 2023-08-27 PROCEDURE — 85018 HEMOGLOBIN: CPT

## 2023-08-27 PROCEDURE — 85025 COMPLETE CBC W/AUTO DIFF WBC: CPT

## 2023-08-27 PROCEDURE — 1100000000 HC RM PRIVATE

## 2023-08-27 RX ORDER — ONDANSETRON 2 MG/ML
8 INJECTION INTRAMUSCULAR; INTRAVENOUS
Status: COMPLETED | OUTPATIENT
Start: 2023-08-27 | End: 2023-08-27

## 2023-08-27 RX ORDER — NALOXONE HYDROCHLORIDE 0.4 MG/ML
0.4 INJECTION, SOLUTION INTRAMUSCULAR; INTRAVENOUS; SUBCUTANEOUS PRN
Status: DISCONTINUED | OUTPATIENT
Start: 2023-08-27 | End: 2023-09-04 | Stop reason: HOSPADM

## 2023-08-27 RX ORDER — ONDANSETRON 2 MG/ML
4 INJECTION INTRAMUSCULAR; INTRAVENOUS EVERY 6 HOURS PRN
Status: DISCONTINUED | OUTPATIENT
Start: 2023-08-27 | End: 2023-09-04 | Stop reason: HOSPADM

## 2023-08-27 RX ORDER — ACETAMINOPHEN 325 MG/1
650 TABLET ORAL EVERY 6 HOURS PRN
Status: DISCONTINUED | OUTPATIENT
Start: 2023-08-27 | End: 2023-09-04 | Stop reason: HOSPADM

## 2023-08-27 RX ORDER — ACETAMINOPHEN 650 MG/1
650 SUPPOSITORY RECTAL EVERY 6 HOURS PRN
Status: DISCONTINUED | OUTPATIENT
Start: 2023-08-27 | End: 2023-09-04 | Stop reason: HOSPADM

## 2023-08-27 RX ORDER — ONDANSETRON 4 MG/1
4 TABLET, ORALLY DISINTEGRATING ORAL EVERY 8 HOURS PRN
Status: DISCONTINUED | OUTPATIENT
Start: 2023-08-27 | End: 2023-09-04 | Stop reason: HOSPADM

## 2023-08-27 RX ORDER — HYDROMORPHONE HYDROCHLORIDE 1 MG/ML
1 INJECTION, SOLUTION INTRAMUSCULAR; INTRAVENOUS; SUBCUTANEOUS ONCE
Status: COMPLETED | OUTPATIENT
Start: 2023-08-27 | End: 2023-08-27

## 2023-08-27 RX ORDER — DEXTROSE AND SODIUM CHLORIDE 5; .45 G/100ML; G/100ML
INJECTION, SOLUTION INTRAVENOUS CONTINUOUS
Status: ACTIVE | OUTPATIENT
Start: 2023-08-27 | End: 2023-08-28

## 2023-08-27 RX ORDER — SODIUM CHLORIDE 9 MG/ML
INJECTION, SOLUTION INTRAVENOUS PRN
Status: DISCONTINUED | OUTPATIENT
Start: 2023-08-27 | End: 2023-09-04 | Stop reason: HOSPADM

## 2023-08-27 RX ORDER — POLYETHYLENE GLYCOL 3350 17 G/17G
17 POWDER, FOR SOLUTION ORAL DAILY PRN
Status: DISCONTINUED | OUTPATIENT
Start: 2023-08-27 | End: 2023-08-28

## 2023-08-27 RX ORDER — 0.9 % SODIUM CHLORIDE 0.9 %
1000 INTRAVENOUS SOLUTION INTRAVENOUS ONCE
Status: COMPLETED | OUTPATIENT
Start: 2023-08-27 | End: 2023-08-27

## 2023-08-27 RX ORDER — SODIUM CHLORIDE 0.9 % (FLUSH) 0.9 %
5-40 SYRINGE (ML) INJECTION PRN
Status: DISCONTINUED | OUTPATIENT
Start: 2023-08-27 | End: 2023-09-04 | Stop reason: HOSPADM

## 2023-08-27 RX ORDER — SODIUM CHLORIDE 0.9 % (FLUSH) 0.9 %
5-40 SYRINGE (ML) INJECTION EVERY 12 HOURS SCHEDULED
Status: DISCONTINUED | OUTPATIENT
Start: 2023-08-27 | End: 2023-09-04 | Stop reason: HOSPADM

## 2023-08-27 RX ORDER — HYDROMORPHONE HYDROCHLORIDE 1 MG/ML
1 INJECTION, SOLUTION INTRAMUSCULAR; INTRAVENOUS; SUBCUTANEOUS EVERY 6 HOURS PRN
Status: DISCONTINUED | OUTPATIENT
Start: 2023-08-27 | End: 2023-08-28

## 2023-08-27 RX ORDER — FENTANYL 25 UG/1
1 PATCH TRANSDERMAL
Status: DISCONTINUED | OUTPATIENT
Start: 2023-08-29 | End: 2023-08-29

## 2023-08-27 RX ADMIN — DEXTROSE AND SODIUM CHLORIDE: 5; 450 INJECTION, SOLUTION INTRAVENOUS at 23:02

## 2023-08-27 RX ADMIN — CEFEPIME 2000 MG: 2 INJECTION, POWDER, FOR SOLUTION INTRAVENOUS at 22:40

## 2023-08-27 RX ADMIN — HYDROMORPHONE HYDROCHLORIDE 1 MG: 1 INJECTION, SOLUTION INTRAMUSCULAR; INTRAVENOUS; SUBCUTANEOUS at 19:56

## 2023-08-27 RX ADMIN — SODIUM CHLORIDE, PRESERVATIVE FREE 10 ML: 5 INJECTION INTRAVENOUS at 23:03

## 2023-08-27 RX ADMIN — IOPAMIDOL 100 ML: 755 INJECTION, SOLUTION INTRAVENOUS at 20:07

## 2023-08-27 RX ADMIN — SODIUM CHLORIDE 1000 ML: 9 INJECTION, SOLUTION INTRAVENOUS at 19:20

## 2023-08-27 RX ADMIN — ONDANSETRON 4 MG: 2 INJECTION INTRAMUSCULAR; INTRAVENOUS at 23:25

## 2023-08-27 RX ADMIN — ONDANSETRON 8 MG: 2 INJECTION INTRAMUSCULAR; INTRAVENOUS at 19:18

## 2023-08-27 RX ADMIN — HYDROMORPHONE HYDROCHLORIDE 1 MG: 1 INJECTION, SOLUTION INTRAMUSCULAR; INTRAVENOUS; SUBCUTANEOUS at 23:24

## 2023-08-27 RX ADMIN — SODIUM CHLORIDE 40 MG: 9 INJECTION INTRAMUSCULAR; INTRAVENOUS; SUBCUTANEOUS at 19:18

## 2023-08-27 ASSESSMENT — PAIN DESCRIPTION - DESCRIPTORS: DESCRIPTORS: DISCOMFORT

## 2023-08-27 ASSESSMENT — PAIN SCALES - GENERAL
PAINLEVEL_OUTOF10: 8
PAINLEVEL_OUTOF10: 7

## 2023-08-27 ASSESSMENT — PAIN DESCRIPTION - LOCATION
LOCATION: GENERALIZED
LOCATION: ABDOMEN

## 2023-08-27 ASSESSMENT — PAIN DESCRIPTION - PAIN TYPE: TYPE: ACUTE PAIN

## 2023-08-27 ASSESSMENT — PAIN DESCRIPTION - ONSET: ONSET: ON-GOING

## 2023-08-27 ASSESSMENT — PAIN DESCRIPTION - FREQUENCY: FREQUENCY: CONTINUOUS

## 2023-08-27 ASSESSMENT — PAIN - FUNCTIONAL ASSESSMENT: PAIN_FUNCTIONAL_ASSESSMENT: PREVENTS OR INTERFERES SOME ACTIVE ACTIVITIES AND ADLS

## 2023-08-27 ASSESSMENT — PAIN DESCRIPTION - ORIENTATION: ORIENTATION: MID

## 2023-08-27 NOTE — ED PROVIDER NOTES
Emergency Department Provider Note                   PCP:                Twanna Scheuermann, MD               Age: 50 y.o. Sex: female       ICD-10-CM    1. Metastatic carcinoma ([address])  C79.9       2. Tachycardia  R00.0       3. Hematemesis with nausea  K92.0       4. Leukocytosis, unspecified type  D72.829           DISPOSITION Admitted 08/27/2023 10:13:26 PM        MDM  Number of Diagnoses or Management Options  Hematemesis with nausea  Leukocytosis, unspecified type  Metastatic carcinoma (HCC)  Tachycardia  Diagnosis management comments: MEDICAL DECISION MAKING  Complexity of Problems Addressed:  1 or more chronic illnesses with a severe exacerbation or progression. 1 or more acute illnesses that pose a threat to life or bodily function. Data Reviewed and Analyzed:  Category 1:   I independently ordered and reviewed each unique test.  I reviewed external records: provider visit note from outside specialist.     Category 2:   I interpreted the CT Scan No free air or obstruction. Category 3: Discussion of management or test interpretation. The patient presents with vomiting and abdominal pain. Reviewed recent records that she did have a recent admission. She has a known fungating mass in the body of the stomach. She had coffee ground emesis in triage. IV Protonix, IV Zofran, and IV fluids ordered for patient. IV Dilaudid given for pain. Blood work shows a leukocytosis that is worse. She has stable hemoglobin. CT has multiple abnormalities, all relatively stable. Given the pain and fluid in abdomen with leukocytosis, blood cultures and IV antibiotics are ordered. The patient appeared much more comfortable after medication treatment. Consulted internal medicine for admission. The patient was admitted and I have discussed patient management with the admitting provider. Risk of Complications and/or Morbidity of Patient Management:  Parental controlled substances given in the ED.        Risk of

## 2023-08-27 NOTE — ED TRIAGE NOTES
Per patient she has been experiencing vomiting and abdominal pain for 2-3 weeks. Vomiting blood. Iv zofran make it better. Patient is not able to keep the food down. Coffee ground.

## 2023-08-28 LAB
ANION GAP SERPL CALC-SCNC: 0 MMOL/L (ref 2–11)
APPEARANCE UR: CLEAR
BACTERIA URNS QL MICRO: 0 /HPF
BILIRUB UR QL: NEGATIVE
BUN SERPL-MCNC: 24 MG/DL (ref 6–23)
CALCIUM SERPL-MCNC: 8.2 MG/DL (ref 8.3–10.4)
CASTS URNS QL MICRO: 0 /LPF
CHLORIDE SERPL-SCNC: 114 MMOL/L (ref 101–110)
CO2 SERPL-SCNC: 33 MMOL/L (ref 21–32)
COLOR UR: ABNORMAL
CREAT SERPL-MCNC: 0.6 MG/DL (ref 0.6–1)
CRYSTALS URNS QL MICRO: 0 /LPF
EPI CELLS #/AREA URNS HPF: ABNORMAL /HPF
GLUCOSE SERPL-MCNC: 145 MG/DL (ref 65–100)
GLUCOSE UR STRIP.AUTO-MCNC: 100 MG/DL
HCT VFR BLD AUTO: 24.1 % (ref 35.8–46.3)
HCT VFR BLD AUTO: 25.5 % (ref 35.8–46.3)
HCT VFR BLD AUTO: 25.6 % (ref 35.8–46.3)
HGB BLD-MCNC: 7.1 G/DL (ref 11.7–15.4)
HGB BLD-MCNC: 7.3 G/DL (ref 11.7–15.4)
HGB BLD-MCNC: 7.4 G/DL (ref 11.7–15.4)
HGB UR QL STRIP: NEGATIVE
KETONES UR QL STRIP.AUTO: NEGATIVE MG/DL
LACTATE SERPL-SCNC: 1.5 MMOL/L (ref 0.4–2)
LEUKOCYTE ESTERASE UR QL STRIP.AUTO: ABNORMAL
MAGNESIUM SERPL-MCNC: 2.1 MG/DL (ref 1.8–2.4)
MUCOUS THREADS URNS QL MICRO: 0 /LPF
NITRITE UR QL STRIP.AUTO: NEGATIVE
OTHER OBSERVATIONS: ABNORMAL
PH UR STRIP: 7 (ref 5–9)
PHOSPHATE SERPL-MCNC: 4.1 MG/DL (ref 2.5–4.5)
POTASSIUM SERPL-SCNC: 3.8 MMOL/L (ref 3.5–5.1)
PROT UR STRIP-MCNC: ABNORMAL MG/DL
RBC #/AREA URNS HPF: 0 /HPF
SODIUM SERPL-SCNC: 147 MMOL/L (ref 133–143)
SP GR UR REFRACTOMETRY: >1.035 (ref 1–1.02)
TRIGL SERPL-MCNC: 128 MG/DL (ref 35–150)
URINE CULTURE IF INDICATED: ABNORMAL
UROBILINOGEN UR QL STRIP.AUTO: 1 EU/DL (ref 0.2–1)
WBC URNS QL MICRO: ABNORMAL /HPF

## 2023-08-28 PROCEDURE — 2500000003 HC RX 250 WO HCPCS: Performed by: INTERNAL MEDICINE

## 2023-08-28 PROCEDURE — 36591 DRAW BLOOD OFF VENOUS DEVICE: CPT

## 2023-08-28 PROCEDURE — 81001 URINALYSIS AUTO W/SCOPE: CPT

## 2023-08-28 PROCEDURE — 2580000003 HC RX 258: Performed by: FAMILY MEDICINE

## 2023-08-28 PROCEDURE — 1100000000 HC RM PRIVATE

## 2023-08-28 PROCEDURE — 99223 1ST HOSP IP/OBS HIGH 75: CPT | Performed by: NURSE PRACTITIONER

## 2023-08-28 PROCEDURE — A4216 STERILE WATER/SALINE, 10 ML: HCPCS | Performed by: FAMILY MEDICINE

## 2023-08-28 PROCEDURE — 6360000002 HC RX W HCPCS: Performed by: FAMILY MEDICINE

## 2023-08-28 PROCEDURE — 6370000000 HC RX 637 (ALT 250 FOR IP): Performed by: NURSE PRACTITIONER

## 2023-08-28 PROCEDURE — 99222 1ST HOSP IP/OBS MODERATE 55: CPT | Performed by: INTERNAL MEDICINE

## 2023-08-28 PROCEDURE — 80048 BASIC METABOLIC PNL TOTAL CA: CPT

## 2023-08-28 PROCEDURE — 83735 ASSAY OF MAGNESIUM: CPT

## 2023-08-28 PROCEDURE — 85018 HEMOGLOBIN: CPT

## 2023-08-28 PROCEDURE — 6360000002 HC RX W HCPCS: Performed by: INTERNAL MEDICINE

## 2023-08-28 PROCEDURE — 2500000003 HC RX 250 WO HCPCS: Performed by: FAMILY MEDICINE

## 2023-08-28 PROCEDURE — 2500000003 HC RX 250 WO HCPCS: Performed by: NURSE PRACTITIONER

## 2023-08-28 PROCEDURE — 1100000003 HC PRIVATE W/ TELEMETRY

## 2023-08-28 PROCEDURE — 84478 ASSAY OF TRIGLYCERIDES: CPT

## 2023-08-28 PROCEDURE — 6360000002 HC RX W HCPCS: Performed by: NURSE PRACTITIONER

## 2023-08-28 PROCEDURE — 84100 ASSAY OF PHOSPHORUS: CPT

## 2023-08-28 PROCEDURE — 85014 HEMATOCRIT: CPT

## 2023-08-28 PROCEDURE — 2580000003 HC RX 258: Performed by: INTERNAL MEDICINE

## 2023-08-28 PROCEDURE — 3E0436Z INTRODUCTION OF NUTRITIONAL SUBSTANCE INTO CENTRAL VEIN, PERCUTANEOUS APPROACH: ICD-10-PCS | Performed by: INTERNAL MEDICINE

## 2023-08-28 PROCEDURE — C9113 INJ PANTOPRAZOLE SODIUM, VIA: HCPCS | Performed by: FAMILY MEDICINE

## 2023-08-28 RX ORDER — POLYETHYLENE GLYCOL 3350 17 G/17G
17 POWDER, FOR SOLUTION ORAL DAILY
Status: DISCONTINUED | OUTPATIENT
Start: 2023-08-28 | End: 2023-09-03

## 2023-08-28 RX ORDER — MAGNESIUM SULFATE IN WATER 40 MG/ML
2000 INJECTION, SOLUTION INTRAVENOUS PRN
Status: DISCONTINUED | OUTPATIENT
Start: 2023-08-28 | End: 2023-09-04 | Stop reason: HOSPADM

## 2023-08-28 RX ORDER — POTASSIUM CHLORIDE 29.8 MG/ML
20 INJECTION INTRAVENOUS PRN
Status: DISCONTINUED | OUTPATIENT
Start: 2023-08-28 | End: 2023-09-04 | Stop reason: HOSPADM

## 2023-08-28 RX ORDER — LACTULOSE 10 G/15ML
30 SOLUTION ORAL ONCE
Status: DISCONTINUED | OUTPATIENT
Start: 2023-08-28 | End: 2023-08-28

## 2023-08-28 RX ORDER — HYDROMORPHONE HYDROCHLORIDE 1 MG/ML
0.5 INJECTION, SOLUTION INTRAMUSCULAR; INTRAVENOUS; SUBCUTANEOUS EVERY 4 HOURS PRN
Status: DISCONTINUED | OUTPATIENT
Start: 2023-08-28 | End: 2023-08-28

## 2023-08-28 RX ORDER — POTASSIUM CHLORIDE 7.45 MG/ML
10 INJECTION INTRAVENOUS PRN
Status: DISCONTINUED | OUTPATIENT
Start: 2023-08-28 | End: 2023-09-04 | Stop reason: HOSPADM

## 2023-08-28 RX ORDER — HYDROMORPHONE HYDROCHLORIDE 1 MG/ML
1 INJECTION, SOLUTION INTRAMUSCULAR; INTRAVENOUS; SUBCUTANEOUS
Status: DISCONTINUED | OUTPATIENT
Start: 2023-08-28 | End: 2023-09-01

## 2023-08-28 RX ORDER — METOCLOPRAMIDE HYDROCHLORIDE 5 MG/ML
10 INJECTION INTRAMUSCULAR; INTRAVENOUS EVERY 4 HOURS PRN
Status: DISCONTINUED | OUTPATIENT
Start: 2023-08-28 | End: 2023-09-04 | Stop reason: HOSPADM

## 2023-08-28 RX ORDER — SENNA AND DOCUSATE SODIUM 50; 8.6 MG/1; MG/1
2 TABLET, FILM COATED ORAL 2 TIMES DAILY
Status: DISCONTINUED | OUTPATIENT
Start: 2023-08-28 | End: 2023-09-03

## 2023-08-28 RX ORDER — HYDROMORPHONE HYDROCHLORIDE 1 MG/ML
1 INJECTION, SOLUTION INTRAMUSCULAR; INTRAVENOUS; SUBCUTANEOUS EVERY 4 HOURS PRN
Status: DISCONTINUED | OUTPATIENT
Start: 2023-08-28 | End: 2023-08-28

## 2023-08-28 RX ADMIN — CEFEPIME 2000 MG: 2 INJECTION, POWDER, FOR SOLUTION INTRAVENOUS at 14:58

## 2023-08-28 RX ADMIN — I.V. FAT EMULSION 250 ML: 20 EMULSION INTRAVENOUS at 18:08

## 2023-08-28 RX ADMIN — SODIUM CHLORIDE, PRESERVATIVE FREE 10 ML: 5 INJECTION INTRAVENOUS at 09:30

## 2023-08-28 RX ADMIN — HYDROMORPHONE HYDROCHLORIDE 1 MG: 1 INJECTION, SOLUTION INTRAMUSCULAR; INTRAVENOUS; SUBCUTANEOUS at 20:03

## 2023-08-28 RX ADMIN — CEFEPIME 2000 MG: 2 INJECTION, POWDER, FOR SOLUTION INTRAVENOUS at 06:00

## 2023-08-28 RX ADMIN — HYDROMORPHONE HYDROCHLORIDE 1 MG: 1 INJECTION, SOLUTION INTRAMUSCULAR; INTRAVENOUS; SUBCUTANEOUS at 05:03

## 2023-08-28 RX ADMIN — CEFEPIME 2000 MG: 2 INJECTION, POWDER, FOR SOLUTION INTRAVENOUS at 22:09

## 2023-08-28 RX ADMIN — HYDROMORPHONE HYDROCHLORIDE 1 MG: 1 INJECTION, SOLUTION INTRAMUSCULAR; INTRAVENOUS; SUBCUTANEOUS at 22:09

## 2023-08-28 RX ADMIN — HYDROMORPHONE HYDROCHLORIDE 1 MG: 1 INJECTION, SOLUTION INTRAMUSCULAR; INTRAVENOUS; SUBCUTANEOUS at 18:00

## 2023-08-28 RX ADMIN — HYDROMORPHONE HYDROCHLORIDE 1 MG: 1 INJECTION, SOLUTION INTRAMUSCULAR; INTRAVENOUS; SUBCUTANEOUS at 14:55

## 2023-08-28 RX ADMIN — ONDANSETRON 4 MG: 2 INJECTION INTRAMUSCULAR; INTRAVENOUS at 05:03

## 2023-08-28 RX ADMIN — VANCOMYCIN HYDROCHLORIDE 1250 MG: 10 INJECTION, POWDER, LYOPHILIZED, FOR SOLUTION INTRAVENOUS at 11:58

## 2023-08-28 RX ADMIN — ONDANSETRON 4 MG: 2 INJECTION INTRAMUSCULAR; INTRAVENOUS at 17:56

## 2023-08-28 RX ADMIN — VANCOMYCIN HYDROCHLORIDE 1250 MG: 10 INJECTION, POWDER, LYOPHILIZED, FOR SOLUTION INTRAVENOUS at 23:54

## 2023-08-28 RX ADMIN — METOCLOPRAMIDE 10 MG: 5 INJECTION, SOLUTION INTRAMUSCULAR; INTRAVENOUS at 19:15

## 2023-08-28 RX ADMIN — HYDROMORPHONE HYDROCHLORIDE 1 MG: 1 INJECTION, SOLUTION INTRAMUSCULAR; INTRAVENOUS; SUBCUTANEOUS at 10:12

## 2023-08-28 RX ADMIN — Medication 1500 MG: at 00:28

## 2023-08-28 RX ADMIN — POLYETHYLENE GLYCOL 3350 17 G: 17 POWDER, FOR SOLUTION ORAL at 14:54

## 2023-08-28 RX ADMIN — HYDROMORPHONE HYDROCHLORIDE 1 MG: 1 INJECTION, SOLUTION INTRAMUSCULAR; INTRAVENOUS; SUBCUTANEOUS at 12:08

## 2023-08-28 RX ADMIN — SODIUM CHLORIDE, PRESERVATIVE FREE 10 ML: 5 INJECTION INTRAVENOUS at 19:17

## 2023-08-28 RX ADMIN — SODIUM CHLORIDE, PRESERVATIVE FREE 40 MG: 5 INJECTION INTRAVENOUS at 05:03

## 2023-08-28 RX ADMIN — ONDANSETRON 4 MG: 2 INJECTION INTRAMUSCULAR; INTRAVENOUS at 10:10

## 2023-08-28 RX ADMIN — METOCLOPRAMIDE 10 MG: 5 INJECTION, SOLUTION INTRAMUSCULAR; INTRAVENOUS at 11:58

## 2023-08-28 RX ADMIN — SODIUM CHLORIDE, PRESERVATIVE FREE 40 MG: 5 INJECTION INTRAVENOUS at 17:52

## 2023-08-28 RX ADMIN — POTASSIUM PHOSPHATE, MONOBASIC POTASSIUM PHOSPHATE, DIBASIC: 224; 236 INJECTION, SOLUTION, CONCENTRATE INTRAVENOUS at 18:05

## 2023-08-28 ASSESSMENT — PAIN DESCRIPTION - DESCRIPTORS
DESCRIPTORS: ACHING;SHARP
DESCRIPTORS: ACHING
DESCRIPTORS: ACHING;DISCOMFORT
DESCRIPTORS: ACHING
DESCRIPTORS: SHARP
DESCRIPTORS: ACHING
DESCRIPTORS: ACHING

## 2023-08-28 ASSESSMENT — PAIN - FUNCTIONAL ASSESSMENT
PAIN_FUNCTIONAL_ASSESSMENT: ACTIVITIES ARE NOT PREVENTED
PAIN_FUNCTIONAL_ASSESSMENT: ACTIVITIES ARE NOT PREVENTED
PAIN_FUNCTIONAL_ASSESSMENT: PREVENTS OR INTERFERES SOME ACTIVE ACTIVITIES AND ADLS
PAIN_FUNCTIONAL_ASSESSMENT: ACTIVITIES ARE NOT PREVENTED

## 2023-08-28 ASSESSMENT — PAIN SCALES - GENERAL
PAINLEVEL_OUTOF10: 8
PAINLEVEL_OUTOF10: 3
PAINLEVEL_OUTOF10: 8
PAINLEVEL_OUTOF10: 0
PAINLEVEL_OUTOF10: 8
PAINLEVEL_OUTOF10: 8
PAINLEVEL_OUTOF10: 3
PAINLEVEL_OUTOF10: 0
PAINLEVEL_OUTOF10: 9
PAINLEVEL_OUTOF10: 0
PAINLEVEL_OUTOF10: 6
PAINLEVEL_OUTOF10: 3
PAINLEVEL_OUTOF10: 0
PAINLEVEL_OUTOF10: 8
PAINLEVEL_OUTOF10: 8

## 2023-08-28 ASSESSMENT — PAIN DESCRIPTION - LOCATION
LOCATION: ABDOMEN

## 2023-08-28 ASSESSMENT — PAIN DESCRIPTION - ORIENTATION
ORIENTATION: RIGHT;LEFT
ORIENTATION: MID
ORIENTATION: RIGHT;LEFT
ORIENTATION: LOWER;UPPER
ORIENTATION: MID
ORIENTATION: LOWER;MID;UPPER
ORIENTATION: MID

## 2023-08-28 ASSESSMENT — PAIN DESCRIPTION - PAIN TYPE
TYPE: ACUTE PAIN

## 2023-08-28 ASSESSMENT — PAIN DESCRIPTION - FREQUENCY
FREQUENCY: CONTINUOUS

## 2023-08-28 ASSESSMENT — PAIN DESCRIPTION - ONSET
ONSET: ON-GOING
ONSET: AWAKENED FROM SLEEP

## 2023-08-28 NOTE — PROGRESS NOTES
Plan:  Dosing recommendations based on Bayesian software  Start vancomycin 1,500 mg load, followed by 1,250 mg  q12h  Anticipated AUC of 490 and trough concentration of 13.5 at steady state  Renal labs as indicated   Vancomycin concentrations will be ordered as clinically appropriate   Pharmacy will continue to monitor patient and adjust therapy as indicated    Thank you for the consult,  Vicky GonzalezD

## 2023-08-28 NOTE — CONSENT
Informed Consent for Blood Component Transfusion Note    I have discussed with the patient the rationale for blood component transfusion; its benefits in treating or preventing fatigue, organ damage, or death; and its risk which includes mild transfusion reactions, rare risk of blood borne infection, or more serious but rare reactions. I have discussed the alternatives to transfusion, including the risk and consequences of not receiving transfusion. The patient had an opportunity to ask questions and had agreed to proceed with transfusion of blood components.     Electronically signed by Jason Farias DO on 8/27/23 at 10:24 PM EDT

## 2023-08-28 NOTE — ACP (ADVANCE CARE PLANNING)
[facility] Hospitalist Service  At the heart of better care     Advance Care Planning   Admit Date:  2023  1:44 PM   Name:  Harika Rice   Age:  50 y.o. Sex:  female  :  1975   MRN:  [id number]   Room:  [place] [address]    Harika Rice has capacity to make her own decisions:   Yes    Patient / surrogate decision-maker directed code status:  Full Code    Patient or surrogate consented to discussion of the current conditions, workup, management plans, prognosis, and the risk for further deterioration. Time spent: 17 minutes in direct discussion.       Signed:  Marcus Saucedo DO

## 2023-08-28 NOTE — CONSULTS
Comprehensive Nutrition Assessment    Type and Reason for Visit: Initial, Consult  Parenteral Nutrition Management (Hospitalists)    Nutrition Recommendations/Plan:   Parenteral Nutrition:  Central parenteral nutrition    Central line infusion  Initiate: Dex 15%, 5% AA 1.56 L (67CB/EN)   12 hour cyclic infusion Infuse at 80ml 5115-8235, infuse at 140 ml 0663-3935, infuse at 80 ml/hr 4725-5446  Initiate 250 ml 20% lipids daily  To provide: 1608 kcal/d (100% of needs), 78 grams of protein/d (100% of needs), 234 grams of CHO/d and 1810 ml of total volume/d. Above regimen: Intended to meet macronutrient goals  Lytes/L:   Sodium ( 20 meq NaCl), Potassium ( 30 meq KCl) Phosphorus ( 5 mmol KPO4), Calcium (4.5 meq), Magnesium 10 meq   Other additives:   MVI Monday Wednesday Friday due to [employer], MTE  Nutrition Related Medication Management: Thiamine Not indicated  Folic Acid Not indicated  Electrolyte Replacement Protocol PRN Initiate for Potassium, Phosphorus, and Magnesium   IVF:  Discontinue at 1800  Labs:   BMP daily  Mg daily x 3 days at initiation then MWF  Phos daily x 3 days at initiation then MWF    Ionized calcium tomorrow  Triglyceride tomorrow  POC Glucoses/SSI Not indicated     Malnutrition Assessment:  Malnutrition Status: At risk for malnutrition (Comment) (PN dependent at baseline, hypermetabolic state)     Nutrition Assessment:  Nutrition History: Pt last d/c from this facility 8/22 resumed home cyclic TPN via PORT (usually infuses 2100 to 0900) last infused at home on 8/26. On 8/7 encounter PN with pt indicated \"TPN bag in pts room , dex 220g, protein 85g, lipids 20% 50g. \"   PN dependent since October of 2022.   Weight hx per inpt RD notes and outpt Onc visits as follows: 113# bed scale wt Oct at PN start, additional wt hx per Oncology visits: 112# 1/12/23, 117# march RD encounter outpt, 121# 4/13/23, 126# 5/4/23, 128# 6/1/23, 125# 4/12/23, 131# 7/13/23, 142# 7/26 and 7/27/23, 138# 8/3/23, increase, glucose decreasing. Phos, TG and Mg similar to historical values. Current Nutrition Therapies:  Diet NPO    Current Intake:   Average Meal Intake: NPO        Anthropometric Measures:  Height: 5' 4\" (162.6 cm)  Current Body Wt: 142 lb 13.7 oz (64.8 kg) (8/27), Weight source: Bed Scale  BMI: 24.5, Normal Weight (BMI 18.5-24. 9)  Admission Body Weight: 142 lb (64.4 kg) (8/27 bed scale)  Ideal Body Weight (Kg) (Calculated): 55 kg (120 lbs), 119 %  Usual Body Wt: 113 lb (51.3 kg) (Oct 23, 2022 bed scale weight with PN start, 117# March RD f/u output), Percent weight change: 26.4       BMI Category Normal Weight (BMI 18.5-24. 9)  Estimated Daily Nutrient Needs:  Energy (kcal/day): 1345-8334 (25-30 kcal/kg) (Kcal/kg Weight used: 57 kg (usual wt in July on [facility]) Other (Comment)  Protein (g/day): 68-86 (1.2-1.5 g/kg) Weight Used: (Other (Comment)) 57 kg (as above)  Fluid (ml/day):   (1 ml/kcal)    Nutrition Diagnosis:   Inadequate oral intake related to altered GI function as evidenced by NPO or clear liquid status due to medical condition (PN dependent at baseline d/t peritoneal carcinomatosis)  Nutrition Interventions:   Food and/or Nutrient Delivery: Start Parenteral Nutrition     Coordination of Nutrition Care: Continue to monitor while inpatient      Goals:       Active Goal: Tolerate nutrition support at goal rate (within 2 days)       Nutrition Monitoring and Evaluation:      Food/Nutrient Intake Outcomes: Parenteral Nutrition Intake/Tolerance  Physical Signs/Symptoms Outcomes: Biochemical Data, GI Status, Fluid Status or Edema, Weight    Discharge Planning:    Parenteral Nutrition    [address] [facility]

## 2023-08-28 NOTE — CONSULTS
[facility] Hematology & Oncology        Inpatient Hematology / Oncology Consult    Reason for Consult:  Tachycardia [R00.0]  Metastatic carcinoma ([address]) [C79.9]  Coffee ground emesis [K92.0]  Hematemesis with nausea [K92.0]  Leukocytosis, unspecified type [R88.789]  Referring Physician:  Albert Page MD    History of Present Illness:  Ms. Ananth Michael is a 50 y.o. female admitted on 8/27/2023. The primary encounter diagnosis was Metastatic carcinoma ([address]). Diagnoses of Tachycardia, Hematemesis with nausea, and Leukocytosis, unspecified type were also pertinent to this visit. Ms Ananth Michael has PMH of GERD. She is a patient of Dr. Jazz Veras with metastatic carcinoma of unknown primary (most c/w GI) with peritoneal carcinomatosis, on TPN. She was initially treated with FOLFOX/Avastin x 8, then had POD. She was then changed to Cyramza/Taxol. Recent abd imaging with concern for POD of peritoneal disease manifesting as distal small bowel obstruction with worsening pain. Tx options are limited d/t poor po intake. She was recently admitted for melena and underwent EGD that showed fungating mass concerning for POD as she recently had EGD that was unremarkable. Given findings, Dr Jazz Veras discussed changing therapy but options again limited by ability to take in PO. She agreed to continue with single-agent taxol and has declined palliative/hospice services after multiple conversations. She received Taxol 8/24. She was able to obtain home Reglan pump. Ms Ananth Michael presented on day of admission with hematemesis. CT CAP showed stable BL pleural effusions and large multiloculated fluid collection in abdomen with stable drainage cathter as well as similar/stable BL hydronephrosis and stable findings of GERD. CT did note moderate-large stool burden. Hgb on arrival 8.0 and down to 7.4 this AM (likely component of dilution - on IVF). GI consulted. Leukocytosis noted on admission (BC and procal WNL). On Cefepime and Vancomycin.  Oncology

## 2023-08-28 NOTE — CONSULTS
Palliative Care    Patient: Mary Saunders MRN: [id number]  SSN: [id number]    YOB: 1975  Age: 50 y.o. Sex: female       Date of Request: 8/27/2023  Date of Consult:  8/28/2023  Reason for Consult:  pain and symptom management and goals of care  Requesting Physician: Dr Werner Mahan      Assessment/Plan:     Principal Diagnosis:    Nausea/Vomiting  R11.2    Additional Diagnoses:   Anorexia  R63.0  Debility, Unspecified  R53.81  Fatigue, Lethargy  R53.83  Frailty  R54  Pain, abdomen  R10.9  Counseling, Encounter for Medical Advice  Z71.9  Encounter for Palliative Care  Z51.5    Palliative Performance Scale (PPS):       Medical Decision Making:   Reviewed and summarized notes from admission to present, as well as last OP PC note on 8/24  Discussed case with appropriate providersWilfredo Davis RN  Reviewed laboratory and x-ray data from admission to present     Pt resting in bed, appears weak and ill. No visitors at bedside. Pt familiar with PC, and was last seen at [facility] on 8/24. Reviewed events, and pt's current level of symptom burden. Pt reports ongoing abdominal pain and nausea/vomiting. She wears a Reglan pump at home, but states she did not bring it with her to the hospital.  She has been receiving Zofran PRN, with suboptimal control of her nausea. Will provide Reglan 10 mg IV q 4 hours PRN. Pt currently has on a Fentanyl 25 mcg TD patch, and has been receiving Dilaudid 1 mg IV q 4 hours PRN. She reports the dose is sufficient, but it wears off long before she can have another dose. Will increase Dilaudid to 1 mg IV q 2 hours PRN. Counseled that her Fentanyl patch may need to be increased if her PRN usage is adequate. We did briefly discuss her goals moving forward. Pt confirms that she is not ready for hospice, and wants to continue treatment. Provided support, and affirmed her wishes. Will continue to follow.      Will discuss findings with members of the interdisciplinary team. performed by Jennifer Kirby MD at [facility] ENDOSCOPY    UROLOGICAL SURGERY Bilateral 03/15/2022    cysto     Family History   Problem Relation Age of Onset    Heart Disease Maternal Grandmother     Hypertension Maternal Grandmother     Heart Disease Maternal Grandfather     Hypertension Maternal Grandfather     Pulmonary Embolism Neg Hx     Colon Cancer Neg Hx     Deep Vein Thrombosis Neg Hx     Ovarian Cancer Neg Hx     Prostate Cancer Neg Hx     Breast Cancer Neg Hx       Social History     Tobacco Use    Smoking status: Never     Passive exposure: Never    Smokeless tobacco: Never   Substance Use Topics    Alcohol use: Not Currently     Prior to Admission medications    Medication Sig Start Date End Date Taking? Authorizing Provider   promethazine (PROMETHEGAN) 25 MG suppository Place 1 suppository rectally every 6 hours as needed for Nausea (Administer with lubricant)  Patient not taking: Reported on 8/27/2023 8/25/23   SCOTT Chaney CNP   fentaNYL (DURAGESIC) 25 MCG/HR Place 1 patch onto the skin every 72 hours for 30 days. Max Daily Amount: 1 patch 8/24/23 9/23/23  SCOTT Suarez   furosemide (LASIX) 20 MG tablet Take 1 tablet by mouth daily for 5 days  Patient not taking: Reported on 8/27/2023 8/22/23 8/27/23  SCOTT Stanford CNP   oxyCODONE (ROXICODONE) 5 MG/5ML solution Take 5-10 mLs by mouth every 4 hours as needed for Pain for up to 7 days.  Max Daily Amount: 60 mg  Patient not taking: Reported on 8/27/2023 8/22/23 8/29/23  SCOTT Stanford CNP   ondansetron (ZOFRAN-ODT) 8 MG TBDP disintegrating tablet Place 1 tablet under the tongue every 8 hours as needed for Nausea or Vomiting  Patient not taking: Reported on 8/27/2023 8/9/23   SCOTT Cason CNP   naloxone [facility]) 4 MG/0.1ML LIQD nasal spray 1 spray by Nasal route as needed for Opioid Reversal 8/9/23   SCOTT Cason CNP   pantoprazole (PROTONIX) 40 MG tablet Take 1 tablet by mouth in the morning and at

## 2023-08-28 NOTE — PROGRESS NOTES
Nutrition F/U:  Assessment:  Pt seen during office visit w/ NP, resuming Taxol today, discharged from hospital earlier this week - has not quite had Reglan pump for 24 hrs yet, but feels her emesis/mucous output is less this morning that usual.  Pt remains TPN dependent for estimated nutrition needs, infusing over 12 hrs/day and managed by [facility] who is also providing continuous Reglan pump. Current BW: 144#, up 6# over the past 3 weeks, abdominal ascites noted. Intervention:  1. Continue w/ liquids as tolerated for pleasure   2. Continue w/ TPN, 12 hrs/day infusion, continuous Reglan pump - all managed by [facility]. Monitoring/Evaluation:  1. RD to follow up during next office visit - follow up wt status, tolerance/intake of po diet, nutrition related lab values w/ TPN, symptom management.       [address] [place] [address]

## 2023-08-28 NOTE — PLAN OF CARE
Patient has remained A&O x 4. HR has been elevated but stable (sinus tach 110s-120s). BP stable, no fevers. frequency of Dilaudid changed to [facility] by palliative care. -IV dilaudid 1 mg x4    periodic nausea and vomiting  -IV Zofran x2  --IV reglan x1    Drained right Pleurex  drain- 450 cc bloody drainage. No BM in approx 2 weeks- given Miralx and senna    Patient educated on new medication. Patient rounded on hourly by myself or patient assistant and encouraged to call with any needs. No signs of distress noted. Bed low, locked, call bell within reach. Honey Boast, RN    Problem: Pain  Goal: Verbalizes/displays adequate comfort level or baseline comfort level  Outcome: Progressing     Problem: Skin/Tissue Integrity  Goal: Absence of new skin breakdown  Description: 1. Monitor for areas of redness and/or skin breakdown  2. Assess vascular access sites hourly  3. Every 4-6 hours minimum:  Change oxygen saturation probe site  4. Every 4-6 hours:  If on nasal continuous positive airway pressure, respiratory therapy assess nares and determine need for appliance change or resting period.   Outcome: Progressing     Problem: Safety - Adult  Goal: Free from fall injury  Outcome: Progressing     Problem: ABCDS Injury Assessment  Goal: Absence of physical injury  Outcome: Progressing  Flowsheets (Taken 8/28/2023 4822)  Absence of Physical Injury: Implement safety measures based on patient assessment

## 2023-08-28 NOTE — CARE COORDINATION
ASSESSMENT NOTE    Attending Physician: Darien Yoo MD  Admit Problem: Tachycardia [R00.0]  Metastatic carcinoma ([address]) [C79.9]  Coffee ground emesis [K92.0]  Hematemesis with nausea [K92.0]  Leukocytosis, unspecified type [D72.829]  Date/Time of Admission: 8/27/2023  6:44 PM  Problem List:  Patient Active Problem List   Diagnosis    Fibroids    Paraesophageal hernia    Menorrhagia with regular cycle    Papanicolaou smear of cervix with low grade squamous intraepithelial lesion (LGSIL)    Iron deficiency anemia due to chronic blood loss    Anemia    History of weight loss surgery    Carcinoma of unknown primary ([address])    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 ([address])    Hydronephrosis of right kidney    Peritoneal carcinoma ([address])    Peritoneal carcinomatosis ([address])    Hematemesis    Current use of long term anticoagulation    Intractable vomiting    Generalized abdominal pain    LFT elevation    Obstruction of both ureters    Hypokalemia    Hypomagnesemia    Hypoalbuminemia    GERD (gastroesophageal reflux disease)    Colitis    Intractable vomiting with nausea    Fever    Chemotherapy induced nausea and vomiting    Intractable nausea and vomiting    Severe protein-calorie malnutrition (HCC)    Dysphagia    Metastatic carcinoma (HCC)    Chronic abdominal pain    Constipation    Localized edema    Abnormal CT of the abdomen    Immunocompromised (HCC)    Dehydration    Sepsis (HCC)    Pleural effusion    Malignant ascites    Hypervolemia    Peritoneal effusion    Bilateral pleural effusion    Other ascites    Cancer associated pain    Coffee ground emesis       Service Assessment  Patient Orientation (P) Alert and Oriented   Cognition (P) Alert   History Provided By (P) Patient, Medical Record   Primary Caregiver (P) Self   Accompanied By/Relationship (P) n/a   Support Systems (P) Spouse/Significant Other   Patient's Healthcare Decision Maker is: (P) Legal Next of Kin   PCP Verified by CM (P) Yes   Last Visit to PCP (P) Within last 3 months   Prior Functional Level (P) Independent in ADLs/IADLs   Current Functional Level (P) Independent in ADLs/IADLs   Can patient return to prior living arrangement (P) Yes   Ability to make needs known: (P) Good   Family able to assist with home care needs: (P) Yes   Would you like for me to discuss the discharge plan with any other family members/significant others, and if so, who? (P) No   Financial Resources (P) Other (Comment) ([employer])   Community Resources (P) None   CM/SW Referral (P) Other (see comment) (n/a)     Social/Functional History  Lives With (P) Significant other, Family   Type of Home (P) [address] [email or web address] (P) One level   Home Access (P) Stairs to enter without rails   Entrance Stairs - Number of Steps (P) 1   Entrance Stairs - Rails     Bathroom Shower/Tub (P) Walk-in shower   Bathroom Toilet (P) Standard   Bathroom Equipment (P) Shower chair   Bathroom Accessibility (P) [place] (P) Feeding equipment (Home TPN through [facility].   Shower Chair.)   Receives Help From (P) Family, Home health ([address] [facility]: SN)   ADL Assistance (P) [address] (P) [address] Work     Driving     Shopping          Other (Comment)     Homemaking Responsibilities     Meal Prep Responsibility     Laundry Responsibility     Cleaning Responsibility     Bill Paying/Finance [employer]     Other (Comment)     Ambuation Assistance (P) Independent   Transfer Assisstance (P) Independent   Active  (P) No   Patient's  Info (P) Family   Mode of Transportation (P) Family   Education     Occupation (P) Unemployed   Type of Occupation       Discharge Planning   Type of Residence (P) House   Living Arrangements

## 2023-08-28 NOTE — H&P
Hospitalist History and Physical   Admit Date:  2023  7:75 PM   Name:  Verna Acevedo   Age:  50 y.o. Sex:  female  :  1975   MRN:  [id number]   Room:  [place] [address]    Presenting/Chief Complaint: Abdominal Pain and Hematemesis     Reason(s) for Admission: Coffee ground emesis [K92.0]     History of Present Illness:   Verna Acevedo is a 50 y.o. female who presented to the ED for cc coffee ground emesis with abdominal pain over the past three weeks. Unable to keep food down. Nothing seems to make better or worse. States every time she leaves the hospital she is unable to tolerate food and has uncontrolled pain. Hx of unknown primary (likely GI origin) carcinoma with mets to peritoneum with intractable nausea and emesis, hematemesis with anemia requiring blood tranfusions, ulcerated fungating mass in body of stomach via EGD on 8/15, esophagitis, and on TPN.     , Hg 8 from 10.1 three days ago. CT abdomen pelvis with contrast - No acute process is identified. 2.  Moderate to large stool burden. 3.  Similar large multiloculated fluid collection throughout the abdomen. Stable   drainage catheter in the right hemiabdomen. 4.  Stable small pleural effusions, right greater than left, with subjacent   atelectasis. 5.  Similar moderate right hydroureteronephrosis. Slightly increased mild left   hydronephrosis. 6.  Fluid-filled distal esophagus with mild circumferential wall thickening. Correlate for gastroesophageal reflux disease/esophagitis. Assessment & Plan: Active Problems:     Coffee ground emesis likely due to her known fungated stomach mass - NPO past midnight. Consult GI to see for possible EGD. Type and screen. She is willing to receive blood if needed. Remote tele. Trend Hg. PPI IV BID    Uncontrolled pain - Has fentanyl patch. Will order PRN dilaudid with PRN narcan.      Intractable nausea with emesis - Doing much better with IV zofran and actually drainage catheter in the right hemiabdomen. 4.  Stable small pleural effusions, right greater than left, with subjacent atelectasis. 5.  Similar moderate right hydroureteronephrosis. Slightly increased mild left hydronephrosis. 6.  Fluid-filled distal esophagus with mild circumferential wall thickening. Correlate for gastroesophageal reflux disease/esophagitis. Will Morrison D.O. 8/27/2023 8:35:00 PM        Signed:  Stacy Escamilla DO    Part of this note may have been written by using a voice dictation software. The note has been proof read but may still contain some grammatical/other typographical errors.

## 2023-08-29 ENCOUNTER — ANESTHESIA (OUTPATIENT)
Dept: ENDOSCOPY | Age: 48
End: 2023-08-29
Payer: COMMERCIAL

## 2023-08-29 ENCOUNTER — ANESTHESIA EVENT (OUTPATIENT)
Dept: ENDOSCOPY | Age: 48
End: 2023-08-29
Payer: COMMERCIAL

## 2023-08-29 PROBLEM — G89.3 CHRONIC PAIN DUE TO NEOPLASM: Status: ACTIVE | Noted: 2023-08-29

## 2023-08-29 LAB
ALBUMIN SERPL-MCNC: 1.7 G/DL (ref 3.5–5)
ALBUMIN/GLOB SERPL: 0.5 (ref 0.4–1.6)
ALP SERPL-CCNC: 196 U/L (ref 50–136)
ALT SERPL-CCNC: 49 U/L (ref 12–65)
ANION GAP SERPL CALC-SCNC: 6 MMOL/L (ref 2–11)
AST SERPL-CCNC: 32 U/L (ref 15–37)
BASOPHILS # BLD: 0.1 K/UL (ref 0–0.2)
BASOPHILS NFR BLD: 1 % (ref 0–2)
BILIRUB SERPL-MCNC: 0.7 MG/DL (ref 0.2–1.1)
BUN SERPL-MCNC: 31 MG/DL (ref 6–23)
CA-I BLD-MCNC: 4.68 MG/DL (ref 4–5.2)
CALCIUM SERPL-MCNC: 8 MG/DL (ref 8.3–10.4)
CHLORIDE SERPL-SCNC: 112 MMOL/L (ref 101–110)
CO2 SERPL-SCNC: 27 MMOL/L (ref 21–32)
CREAT SERPL-MCNC: 0.6 MG/DL (ref 0.6–1)
DIFFERENTIAL METHOD BLD: ABNORMAL
EOSINOPHIL # BLD: 0 K/UL (ref 0–0.8)
EOSINOPHIL NFR BLD: 0 % (ref 0.5–7.8)
ERYTHROCYTE [DISTWIDTH] IN BLOOD BY AUTOMATED COUNT: 17.2 % (ref 11.9–14.6)
GLOBULIN SER CALC-MCNC: 3.2 G/DL (ref 2.8–4.5)
GLUCOSE SERPL-MCNC: 215 MG/DL (ref 65–100)
HCT VFR BLD AUTO: 23.2 % (ref 35.8–46.3)
HCT VFR BLD AUTO: 28.1 % (ref 35.8–46.3)
HCT VFR BLD AUTO: 31.2 % (ref 35.8–46.3)
HCT VFR BLD AUTO: 32.9 % (ref 35.8–46.3)
HCT VFR BLD AUTO: 33.1 % (ref 35.8–46.3)
HGB BLD-MCNC: 10.1 G/DL (ref 11.7–15.4)
HGB BLD-MCNC: 10.4 G/DL (ref 11.7–15.4)
HGB BLD-MCNC: 10.5 G/DL (ref 11.7–15.4)
HGB BLD-MCNC: 6.7 G/DL (ref 11.7–15.4)
HGB BLD-MCNC: 8.4 G/DL (ref 11.7–15.4)
HISTORY CHECK: NORMAL
HISTORY CHECK: NORMAL
IMM GRANULOCYTES # BLD AUTO: 0.5 K/UL (ref 0–0.5)
IMM GRANULOCYTES NFR BLD AUTO: 4 % (ref 0–5)
LYMPHOCYTES # BLD: 1.5 K/UL (ref 0.5–4.6)
LYMPHOCYTES NFR BLD: 13 % (ref 13–44)
MAGNESIUM SERPL-MCNC: 2.4 MG/DL (ref 1.8–2.4)
MCH RBC QN AUTO: 28 PG (ref 26.1–32.9)
MCHC RBC AUTO-ENTMCNC: 29.9 G/DL (ref 31.4–35)
MCV RBC AUTO: 93.7 FL (ref 82–102)
MONOCYTES # BLD: 0.4 K/UL (ref 0.1–1.3)
MONOCYTES NFR BLD: 3 % (ref 4–12)
NEUTS SEG # BLD: 9.1 K/UL (ref 1.7–8.2)
NEUTS SEG NFR BLD: 79 % (ref 43–78)
NRBC # BLD: 0.14 K/UL (ref 0–0.2)
PHOSPHATE SERPL-MCNC: 2 MG/DL (ref 2.5–4.5)
PHOSPHATE SERPL-MCNC: 2.3 MG/DL (ref 2.5–4.5)
PLATELET # BLD AUTO: 237 K/UL (ref 150–450)
PMV BLD AUTO: 11.7 FL (ref 9.4–12.3)
POTASSIUM SERPL-SCNC: 4 MMOL/L (ref 3.5–5.1)
POTASSIUM SERPL-SCNC: 4.1 MMOL/L (ref 3.5–5.1)
PROT SERPL-MCNC: 4.9 G/DL (ref 6.3–8.2)
RBC # BLD AUTO: 3 M/UL (ref 4.05–5.2)
SODIUM SERPL-SCNC: 145 MMOL/L (ref 133–143)
SODIUM SERPL-SCNC: 149 MMOL/L (ref 133–143)
TRIGL SERPL-MCNC: 168 MG/DL (ref 35–150)
VANCOMYCIN SERPL-MCNC: 28.9 UG/ML
WBC # BLD AUTO: 11.5 K/UL (ref 4.3–11.1)

## 2023-08-29 PROCEDURE — 86644 CMV ANTIBODY: CPT

## 2023-08-29 PROCEDURE — 36591 DRAW BLOOD OFF VENOUS DEVICE: CPT

## 2023-08-29 PROCEDURE — P9040 RBC LEUKOREDUCED IRRADIATED: HCPCS

## 2023-08-29 PROCEDURE — C1052 HEMOSTATIC AGENT, GI, TOPIC: HCPCS | Performed by: INTERNAL MEDICINE

## 2023-08-29 PROCEDURE — 1100000000 HC RM PRIVATE

## 2023-08-29 PROCEDURE — 2500000003 HC RX 250 WO HCPCS: Performed by: NURSE ANESTHETIST, CERTIFIED REGISTERED

## 2023-08-29 PROCEDURE — 99232 SBSQ HOSP IP/OBS MODERATE 35: CPT | Performed by: PHYSICIAN ASSISTANT

## 2023-08-29 PROCEDURE — 6370000000 HC RX 637 (ALT 250 FOR IP): Performed by: PHYSICIAN ASSISTANT

## 2023-08-29 PROCEDURE — 2500000003 HC RX 250 WO HCPCS: Performed by: INTERNAL MEDICINE

## 2023-08-29 PROCEDURE — C9113 INJ PANTOPRAZOLE SODIUM, VIA: HCPCS | Performed by: FAMILY MEDICINE

## 2023-08-29 PROCEDURE — 6370000000 HC RX 637 (ALT 250 FOR IP): Performed by: FAMILY MEDICINE

## 2023-08-29 PROCEDURE — 83735 ASSAY OF MAGNESIUM: CPT

## 2023-08-29 PROCEDURE — 0W3P8ZZ CONTROL BLEEDING IN GASTROINTESTINAL TRACT, VIA NATURAL OR ARTIFICIAL OPENING ENDOSCOPIC: ICD-10-PCS | Performed by: INTERNAL MEDICINE

## 2023-08-29 PROCEDURE — 6360000002 HC RX W HCPCS: Performed by: INTERNAL MEDICINE

## 2023-08-29 PROCEDURE — 6360000002 HC RX W HCPCS: Performed by: NURSE ANESTHETIST, CERTIFIED REGISTERED

## 2023-08-29 PROCEDURE — 2709999900 HC NON-CHARGEABLE SUPPLY: Performed by: INTERNAL MEDICINE

## 2023-08-29 PROCEDURE — 85025 COMPLETE CBC W/AUTO DIFF WBC: CPT

## 2023-08-29 PROCEDURE — 2580000003 HC RX 258: Performed by: FAMILY MEDICINE

## 2023-08-29 PROCEDURE — 99233 SBSQ HOSP IP/OBS HIGH 50: CPT | Performed by: INTERNAL MEDICINE

## 2023-08-29 PROCEDURE — 82330 ASSAY OF CALCIUM: CPT

## 2023-08-29 PROCEDURE — 3609013000 HC EGD TRANSORAL CONTROL BLEEDING ANY METHOD: Performed by: INTERNAL MEDICINE

## 2023-08-29 PROCEDURE — 84478 ASSAY OF TRIGLYCERIDES: CPT

## 2023-08-29 PROCEDURE — 30233N1 TRANSFUSION OF NONAUTOLOGOUS RED BLOOD CELLS INTO PERIPHERAL VEIN, PERCUTANEOUS APPROACH: ICD-10-PCS | Performed by: INTERNAL MEDICINE

## 2023-08-29 PROCEDURE — 84295 ASSAY OF SERUM SODIUM: CPT

## 2023-08-29 PROCEDURE — 2580000003 HC RX 258: Performed by: INTERNAL MEDICINE

## 2023-08-29 PROCEDURE — 7100000010 HC PHASE II RECOVERY - FIRST 15 MIN: Performed by: INTERNAL MEDICINE

## 2023-08-29 PROCEDURE — 2580000003 HC RX 258: Performed by: ANESTHESIOLOGY

## 2023-08-29 PROCEDURE — 84132 ASSAY OF SERUM POTASSIUM: CPT

## 2023-08-29 PROCEDURE — 3700000001 HC ADD 15 MINUTES (ANESTHESIA): Performed by: INTERNAL MEDICINE

## 2023-08-29 PROCEDURE — 6360000002 HC RX W HCPCS: Performed by: FAMILY MEDICINE

## 2023-08-29 PROCEDURE — 3700000000 HC ANESTHESIA ATTENDED CARE: Performed by: INTERNAL MEDICINE

## 2023-08-29 PROCEDURE — 84100 ASSAY OF PHOSPHORUS: CPT

## 2023-08-29 PROCEDURE — 1100000003 HC PRIVATE W/ TELEMETRY

## 2023-08-29 PROCEDURE — 7100000011 HC PHASE II RECOVERY - ADDTL 15 MIN: Performed by: INTERNAL MEDICINE

## 2023-08-29 PROCEDURE — 80053 COMPREHEN METABOLIC PANEL: CPT

## 2023-08-29 PROCEDURE — 2720000010 HC SURG SUPPLY STERILE: Performed by: INTERNAL MEDICINE

## 2023-08-29 PROCEDURE — 2500000003 HC RX 250 WO HCPCS: Performed by: NURSE PRACTITIONER

## 2023-08-29 PROCEDURE — 6360000002 HC RX W HCPCS: Performed by: NURSE PRACTITIONER

## 2023-08-29 PROCEDURE — A4216 STERILE WATER/SALINE, 10 ML: HCPCS | Performed by: FAMILY MEDICINE

## 2023-08-29 PROCEDURE — 80202 ASSAY OF VANCOMYCIN: CPT

## 2023-08-29 PROCEDURE — 6370000000 HC RX 637 (ALT 250 FOR IP): Performed by: NURSE PRACTITIONER

## 2023-08-29 PROCEDURE — 36430 TRANSFUSION BLD/BLD COMPNT: CPT

## 2023-08-29 PROCEDURE — XW0G886 INTRODUCTION OF MINERAL-BASED TOPICAL HEMOSTATIC AGENT INTO UPPER GI, VIA NATURAL OR ARTIFICIAL OPENING ENDOSCOPIC, NEW TECHNOLOGY GROUP 6: ICD-10-PCS | Performed by: INTERNAL MEDICINE

## 2023-08-29 DEVICE — INSTINCT PLUS ENDOSCOPIC CLIPPING DEVICE
Type: IMPLANTABLE DEVICE | Status: FUNCTIONAL
Brand: INSTINCT

## 2023-08-29 RX ORDER — OXYCODONE HCL 5 MG/5 ML
15 SOLUTION, ORAL ORAL
Status: DISCONTINUED | OUTPATIENT
Start: 2023-08-29 | End: 2023-09-04 | Stop reason: HOSPADM

## 2023-08-29 RX ORDER — GLYCOPYRROLATE 0.2 MG/ML
INJECTION INTRAMUSCULAR; INTRAVENOUS PRN
Status: DISCONTINUED | OUTPATIENT
Start: 2023-08-29 | End: 2023-08-29 | Stop reason: SDUPTHER

## 2023-08-29 RX ORDER — SODIUM CHLORIDE, SODIUM LACTATE, POTASSIUM CHLORIDE, CALCIUM CHLORIDE 600; 310; 30; 20 MG/100ML; MG/100ML; MG/100ML; MG/100ML
INJECTION, SOLUTION INTRAVENOUS CONTINUOUS PRN
Status: COMPLETED | OUTPATIENT
Start: 2023-08-29 | End: 2023-08-29

## 2023-08-29 RX ORDER — DIPHENHYDRAMINE HYDROCHLORIDE 50 MG/ML
12.5 INJECTION INTRAMUSCULAR; INTRAVENOUS EVERY 6 HOURS PRN
Status: DISCONTINUED | OUTPATIENT
Start: 2023-08-29 | End: 2023-09-04 | Stop reason: HOSPADM

## 2023-08-29 RX ORDER — PROPOFOL 10 MG/ML
INJECTION, EMULSION INTRAVENOUS PRN
Status: DISCONTINUED | OUTPATIENT
Start: 2023-08-29 | End: 2023-08-29 | Stop reason: SDUPTHER

## 2023-08-29 RX ORDER — FENTANYL 50 UG/1
1 PATCH TRANSDERMAL
Status: DISCONTINUED | OUTPATIENT
Start: 2023-08-29 | End: 2023-08-31

## 2023-08-29 RX ORDER — SODIUM CHLORIDE 9 MG/ML
INJECTION, SOLUTION INTRAVENOUS PRN
Status: DISCONTINUED | OUTPATIENT
Start: 2023-08-29 | End: 2023-09-04 | Stop reason: HOSPADM

## 2023-08-29 RX ORDER — LIDOCAINE HYDROCHLORIDE 20 MG/ML
INJECTION, SOLUTION EPIDURAL; INFILTRATION; INTRACAUDAL; PERINEURAL PRN
Status: DISCONTINUED | OUTPATIENT
Start: 2023-08-29 | End: 2023-08-29 | Stop reason: SDUPTHER

## 2023-08-29 RX ORDER — BISACODYL 10 MG
10 SUPPOSITORY, RECTAL RECTAL DAILY PRN
Status: DISCONTINUED | OUTPATIENT
Start: 2023-08-29 | End: 2023-09-04 | Stop reason: HOSPADM

## 2023-08-29 RX ORDER — SCOLOPAMINE TRANSDERMAL SYSTEM 1 MG/1
1 PATCH, EXTENDED RELEASE TRANSDERMAL
Status: DISCONTINUED | OUTPATIENT
Start: 2023-08-29 | End: 2023-09-04 | Stop reason: HOSPADM

## 2023-08-29 RX ADMIN — CEFEPIME 2000 MG: 2 INJECTION, POWDER, FOR SOLUTION INTRAVENOUS at 06:00

## 2023-08-29 RX ADMIN — ONDANSETRON 4 MG: 2 INJECTION INTRAMUSCULAR; INTRAVENOUS at 00:14

## 2023-08-29 RX ADMIN — HYDROMORPHONE HYDROCHLORIDE 1 MG: 1 INJECTION, SOLUTION INTRAMUSCULAR; INTRAVENOUS; SUBCUTANEOUS at 20:28

## 2023-08-29 RX ADMIN — SODIUM CHLORIDE, PRESERVATIVE FREE 40 MG: 5 INJECTION INTRAVENOUS at 16:11

## 2023-08-29 RX ADMIN — LIDOCAINE HYDROCHLORIDE 60 MG: 20 INJECTION, SOLUTION EPIDURAL; INFILTRATION; INTRACAUDAL; PERINEURAL at 09:57

## 2023-08-29 RX ADMIN — PROPOFOL 50 MG: 10 INJECTION, EMULSION INTRAVENOUS at 09:57

## 2023-08-29 RX ADMIN — I.V. FAT EMULSION 250 ML: 20 EMULSION INTRAVENOUS at 17:33

## 2023-08-29 RX ADMIN — ACETAMINOPHEN 650 MG: 650 SUPPOSITORY RECTAL at 01:32

## 2023-08-29 RX ADMIN — HYDROMORPHONE HYDROCHLORIDE 1 MG: 1 INJECTION, SOLUTION INTRAMUSCULAR; INTRAVENOUS; SUBCUTANEOUS at 16:10

## 2023-08-29 RX ADMIN — GLYCOPYRROLATE 0.2 MG: 0.2 INJECTION INTRAMUSCULAR; INTRAVENOUS at 09:57

## 2023-08-29 RX ADMIN — HYDROMORPHONE HYDROCHLORIDE 1 MG: 1 INJECTION, SOLUTION INTRAMUSCULAR; INTRAVENOUS; SUBCUTANEOUS at 00:14

## 2023-08-29 RX ADMIN — HYDROMORPHONE HYDROCHLORIDE 1 MG: 1 INJECTION, SOLUTION INTRAMUSCULAR; INTRAVENOUS; SUBCUTANEOUS at 18:22

## 2023-08-29 RX ADMIN — HYDROMORPHONE HYDROCHLORIDE 1 MG: 1 INJECTION, SOLUTION INTRAMUSCULAR; INTRAVENOUS; SUBCUTANEOUS at 06:10

## 2023-08-29 RX ADMIN — SODIUM CHLORIDE, PRESERVATIVE FREE 10 ML: 5 INJECTION INTRAVENOUS at 20:30

## 2023-08-29 RX ADMIN — VANCOMYCIN HYDROCHLORIDE 1250 MG: 10 INJECTION, POWDER, LYOPHILIZED, FOR SOLUTION INTRAVENOUS at 12:11

## 2023-08-29 RX ADMIN — HYDROMORPHONE HYDROCHLORIDE 1 MG: 1 INJECTION, SOLUTION INTRAMUSCULAR; INTRAVENOUS; SUBCUTANEOUS at 14:00

## 2023-08-29 RX ADMIN — METOCLOPRAMIDE 10 MG: 5 INJECTION, SOLUTION INTRAMUSCULAR; INTRAVENOUS at 08:38

## 2023-08-29 RX ADMIN — ONDANSETRON 4 MG: 2 INJECTION INTRAMUSCULAR; INTRAVENOUS at 06:10

## 2023-08-29 RX ADMIN — PROPOFOL 50 MCG/KG/MIN: 10 INJECTION, EMULSION INTRAVENOUS at 09:58

## 2023-08-29 RX ADMIN — METOCLOPRAMIDE 10 MG: 5 INJECTION, SOLUTION INTRAMUSCULAR; INTRAVENOUS at 04:17

## 2023-08-29 RX ADMIN — CEFEPIME 2000 MG: 2 INJECTION, POWDER, FOR SOLUTION INTRAVENOUS at 14:00

## 2023-08-29 RX ADMIN — DIPHENHYDRAMINE HYDROCHLORIDE 12.5 MG: 50 INJECTION INTRAMUSCULAR; INTRAVENOUS at 01:31

## 2023-08-29 RX ADMIN — HYDROMORPHONE HYDROCHLORIDE 1 MG: 1 INJECTION, SOLUTION INTRAMUSCULAR; INTRAVENOUS; SUBCUTANEOUS at 08:38

## 2023-08-29 RX ADMIN — CEFEPIME 2000 MG: 2 INJECTION, POWDER, FOR SOLUTION INTRAVENOUS at 22:20

## 2023-08-29 RX ADMIN — SODIUM CHLORIDE, PRESERVATIVE FREE 10 ML: 5 INJECTION INTRAVENOUS at 08:39

## 2023-08-29 RX ADMIN — SODIUM CHLORIDE, PRESERVATIVE FREE 40 MG: 5 INJECTION INTRAVENOUS at 04:17

## 2023-08-29 RX ADMIN — HYDROMORPHONE HYDROCHLORIDE 1 MG: 1 INJECTION, SOLUTION INTRAMUSCULAR; INTRAVENOUS; SUBCUTANEOUS at 11:59

## 2023-08-29 RX ADMIN — POTASSIUM PHOSPHATE, MONOBASIC POTASSIUM PHOSPHATE, DIBASIC: 224; 236 INJECTION, SOLUTION, CONCENTRATE INTRAVENOUS at 17:30

## 2023-08-29 RX ADMIN — HYDROMORPHONE HYDROCHLORIDE 1 MG: 1 INJECTION, SOLUTION INTRAMUSCULAR; INTRAVENOUS; SUBCUTANEOUS at 04:17

## 2023-08-29 RX ADMIN — ONDANSETRON 4 MG: 2 INJECTION INTRAMUSCULAR; INTRAVENOUS at 12:03

## 2023-08-29 RX ADMIN — METOCLOPRAMIDE 10 MG: 5 INJECTION, SOLUTION INTRAMUSCULAR; INTRAVENOUS at 15:43

## 2023-08-29 RX ADMIN — METOCLOPRAMIDE 10 MG: 5 INJECTION, SOLUTION INTRAMUSCULAR; INTRAVENOUS at 20:28

## 2023-08-29 RX ADMIN — ONDANSETRON 4 MG: 2 INJECTION INTRAMUSCULAR; INTRAVENOUS at 18:21

## 2023-08-29 ASSESSMENT — PAIN SCALES - GENERAL
PAINLEVEL_OUTOF10: 8
PAINLEVEL_OUTOF10: 7
PAINLEVEL_OUTOF10: 7
PAINLEVEL_OUTOF10: 8
PAINLEVEL_OUTOF10: 5
PAINLEVEL_OUTOF10: 4
PAINLEVEL_OUTOF10: 7
PAINLEVEL_OUTOF10: 7
PAINLEVEL_OUTOF10: 8
PAINLEVEL_OUTOF10: 0
PAINLEVEL_OUTOF10: 7
PAINLEVEL_OUTOF10: 9
PAINLEVEL_OUTOF10: 0
PAINLEVEL_OUTOF10: 8
PAINLEVEL_OUTOF10: 0

## 2023-08-29 ASSESSMENT — PAIN DESCRIPTION - PAIN TYPE
TYPE: ACUTE PAIN;DEEP SOMATIC PAIN
TYPE: ACUTE PAIN
TYPE: ACUTE PAIN;DEEP SOMATIC PAIN
TYPE: ACUTE PAIN
TYPE: ACUTE PAIN;DEEP SOMATIC PAIN
TYPE: ACUTE PAIN;DEEP SOMATIC PAIN
TYPE: ACUTE PAIN

## 2023-08-29 ASSESSMENT — PAIN DESCRIPTION - DESCRIPTORS
DESCRIPTORS: SHARP
DESCRIPTORS: SHARP
DESCRIPTORS: ACHING
DESCRIPTORS: ACHING
DESCRIPTORS: SHARP
DESCRIPTORS: SHARP;ACHING
DESCRIPTORS: ACHING
DESCRIPTORS: ACHING;SHARP
DESCRIPTORS: SHARP

## 2023-08-29 ASSESSMENT — PAIN DESCRIPTION - DIRECTION: RADIATING_TOWARDS: MA

## 2023-08-29 ASSESSMENT — PAIN DESCRIPTION - ONSET
ONSET: AWAKENED FROM SLEEP
ONSET: ON-GOING
ONSET: AWAKENED FROM SLEEP
ONSET: ON-GOING

## 2023-08-29 ASSESSMENT — PAIN DESCRIPTION - LOCATION
LOCATION: ABDOMEN

## 2023-08-29 ASSESSMENT — PAIN DESCRIPTION - FREQUENCY
FREQUENCY: CONTINUOUS

## 2023-08-29 ASSESSMENT — PAIN DESCRIPTION - ORIENTATION
ORIENTATION: MID

## 2023-08-29 ASSESSMENT — PAIN - FUNCTIONAL ASSESSMENT
PAIN_FUNCTIONAL_ASSESSMENT: ACTIVITIES ARE NOT PREVENTED
PAIN_FUNCTIONAL_ASSESSMENT: ACTIVITIES ARE NOT PREVENTED
PAIN_FUNCTIONAL_ASSESSMENT: PREVENTS OR INTERFERES SOME ACTIVE ACTIVITIES AND ADLS
PAIN_FUNCTIONAL_ASSESSMENT: 0-10
PAIN_FUNCTIONAL_ASSESSMENT: ACTIVITIES ARE NOT PREVENTED

## 2023-08-29 NOTE — PROGRESS NOTES
[facility] Hematology & Oncology        Inpatient Hematology / Oncology Progress Note    Reason for Admission:  Tachycardia [R00.0]  Metastatic carcinoma ([address]) [C79.9]  Coffee ground emesis [K92.0]  Hematemesis with nausea [K92.0]  Leukocytosis, unspecified type [D72.829]    24 Hour Events:  Afebrile, [id number], BP increased  Pain and nausea ongoing  EGD-ulcerated obstructing stomach mass with active bleeding  Still without BM    Transfusions: None  Replacements: Phos    ROS:  Constitutional: Positive for malaise and fatigue; negative for fever, chills, weakness. CV: Negative for chest pain, palpitations, edema. Respiratory: Negative for dyspnea, cough, wheezing. GI: Positive for nausea and abdominal pain; negative for diarrhea. 10 point review of systems is otherwise negative with the exception of the elements mentioned above in the HPI.        No Known Allergies  Past Medical History:   Diagnosis Date    Abdominal pain 10/3/2022    Alteration in nutrition     per RD note TPN dependent 12 hours per day- [facility]    Anemia     Chemotherapy induced nausea and vomiting 10/14/2022    Colon cancer ([address]) dx 2022    followed by dr Antoni Welch    COVID-19 2020    no hospitalization    GERD (gastroesophageal reflux disease)     managed with med    History of blood transfusion     History of colonoscopy 2018    Dr. Lorena Anders, nl (see media note), R     Hx of blood clots 2022    per pt \"small clot on lung identified by CT scan\"  CT Scan impression:- Nonobstructive pulmonary filling defect involving Left Lower Lobe     Hypotension     asymptomatic    Intractable pain 2023    Obstruction of fallopian tube     per pt has \"1 good tube\"    Peritoneal carcinomatosis ([address])     chemo; abdominal pleurx    Weight loss     80lbs weight loss after gastric sleeve     Past Surgical History:   Procedure Laterality Date     SECTION  2009    CYSTOSCOPY Bilateral 2022    CYSTOSCOPY Metastatic carcinoma ([address])  C79.9         2. Tachycardia  R00.0         3. Hematemesis with nausea  K92.0         4. Leukocytosis, unspecified type  D72.829      Ms. Ananth Michael is a 50 y.o. female admitted on 8/27/2023. The primary encounter diagnosis was Metastatic carcinoma ([address] Diagnoses of Tachycardia, Hematemesis with nausea, and Leukocytosis, unspecified type were also pertinent to this visit. Ms Ananth Michael has PMH of GERD. She is a patient of Dr. Jazz Veras with metastatic carcinoma of unknown primary (most c/w GI) with peritoneal carcinomatosis, on TPN. She was initially treated with FOLFOX/Avastin x 8, then had POD. She was then changed to Cyramza/Taxol. Recent abd imaging with concern for POD of peritoneal disease manifesting as distal small bowel obstruction with worsening pain. Tx options are limited d/t poor po intake. She was recently admitted for melena and underwent EGD that showed fungating mass concerning for POD as she recently had EGD that was unremarkable. Given findings, Dr Jazz Veras discussed changing therapy but options again limited by ability to take in PO. She agreed to continue with single-agent taxol and has declined palliative/hospice services after multiple conversations. She received Taxol 8/24. She was able to obtain home Reglan pump. Ms Ananth Michael presented on day of admission with hematemesis. CT CAP showed stable BL pleural effusions and large multiloculated fluid collection in abdomen with stable drainage cathter as well as similar/stable BL hydronephrosis and stable findings of GERD. CT did note moderate-large stool burden. Hgb on arrival 8.0 and down to 7.4 this AM (likely component of dilution - on IVF). GI consulted. Leukocytosis noted on admission (BC and procal WNL). On Cefepime and Vancomycin. Oncology consulted to assume care.       RECOMMENDATIONS:    Metastatic cancer of unknown primary (peritoneal carcinomatosis)  - Most recently receiving Taxol/Cyramza (held since June)  - Had

## 2023-08-29 NOTE — OP NOTE
EGD Procedure Report    Molly Blake  [id number]  1975      Procedure:   EGD     Date:  8/29/2023. Endoscopist: Adriana Huynh MD.    Referring Provider:  Margot Guerra MD.    Estimated blood loss:  Minimal.    Sedation:MAC per Anesthesia     Complications:  None. Preoperative diagnosis: hematemesis       Pro rectal bleeding, or melena cedure note: The patient was encountered in the preoperative area. After risks,benefits and alternatives explained including but not limited to the risk of infection, perforation, missed lesions, possibility of surgery to correct those risks and death. Informed consent was obtained and witnessed by the Nurse. The patient was examined. The patient was taken to the endoscopy suite and placed in the left lateral decubitus position. A bite block was inserted. The patient was sedated. Upon achieving adequate sedation, the upper endoscope scope was inserted into the oropharynx and advanced under direct visualization into the stomach and then further into the distal duodenum. In the distal duodenum, The endoscope was then slowly withdrawn and a careful inspection was performed with mucosal fold. The ampulla visualized present. The endoscope was then withdrawn into the antrum. In the antrum, there were findings consistent with nonerosive gastrits. In the body there was an large fungating ulcerated stomach mass previously known with active oozing sp jonah Miguel. The endoscope was then retroflexed. In the retroflexed view, there was no acute findings   The endoscope was then retroflexed and withdrawn into the distal esophagus. The GE junction was normal.  The endoscope was then slowly withdrawn through the esophagus, which appeared normal   The upper endoscope was removed after air was suctioned out. The procedure was terminated.   The patient tolerated the procedure without any complication and was transported back to the recovery area in stable condition. Findings large ulcerated obstructing stomach mass consistent with previous history of adenocarcinoma of stomach with active oozing of blood sp apc and hemospray       Recommendation:  ppi iv bid , transfuse prbcs to keep hgb >8 . Pallative care.   Call with questions             Signed By: Gaylan Dancer, MD     8/29/2023  10:13 AM

## 2023-08-29 NOTE — PROGRESS NOTES
EOS:    -pt refused senna due to nausea  -pt given dilaudid 5x for pain  -pt given reglan 2x for n/v  -pt given zofran 2x for n/v  -pt hgb:6.7  -1 unit PRBCs ordered and given per Andres SOPS  -hgb now: 8.4  -next h & h due at noon  -TPN stopped at 6am per order  -pt resting in bed  -no needs at this time  -vss

## 2023-08-29 NOTE — PROGRESS NOTES
Palliative Care Progress Note    Patient: Merline Choi MRN: [id number]  SSN: [id number]    YOB: 1975  Age: 50 y.o. 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 She was just discharged from the hospital on 8/22/2023 after being admitted for the same complaints. Pt denied fevers, cough, diarrhea. Work up was remarkable for hypernatremia, tachycardia, anemia, and bilateral hydronephrosis as a result of tumor burden. Pt was admitted for further management. She reports ongoing abdominal pain and nausea/vomiting at present. Pt seen after EGD, resting in bed. She appears more comfortable today. No visitors at bedside. Per patient, her family comes after work. Discussed EGD findings and obstructing mass. Pt states her nausea has relief with IV Zofran. Has used ODT Zofran at home without much relief. She also notes she tries to ask for dilaudid before her pain becomes severe, as to keep it at a manageable level. She has not had a BM in approx 1 week per nursing. Pt states she does not feel like she needs to. Principal Diagnosis:    Nausea/Vomiting  R11.2     Additional Diagnoses:   Anorexia  R63.0  Debility, Unspecified  R53.81  Fatigue, Lethargy  R53.83  Frailty  R54  Pain, abdomen  R10.9  Counseling, Encounter for Medical Advice  Z71.9  Encounter for Palliative Care  Z51.5      Medical Decision Making:   Reviewed and summarized notes from admission to present, EGD results of this morning. Discussed EGD results with the patient. In regards to hospice or further treatment, she states she is still contemplating her next steps.  Her desire is to continue

## 2023-08-29 NOTE — ANESTHESIA PRE PROCEDURE
Department of Anesthesiology  Preprocedure Note       Name:  Aneta Oneil   Age:  50 y.o.  :  1975                                          MRN:  [id number]         Date:  2023      Surgeon: Matthias Marroquin):  Ahsan Snyder MD    Procedure: Procedure(s):  EGD [facility]    Medications prior to admission:   Prior to Admission medications    Medication Sig Start Date End Date Taking? Authorizing Provider   promethazine (PROMETHEGAN) 25 MG suppository Place 1 suppository rectally every 6 hours as needed for Nausea (Administer with lubricant)  Patient not taking: Reported on 2023   SCOTT Serna CNP   fentaNYL (DURAGESIC) 25 MCG/HR Place 1 patch onto the skin every 72 hours for 30 days. Max Daily Amount: 1 patch 23  SCOTT Saenz   furosemide (LASIX) 20 MG tablet Take 1 tablet by mouth daily for 5 days  Patient not taking: Reported on 2023  SCOTT Rader CNP   oxyCODONE (ROXICODONE) 5 MG/5ML solution Take 5-10 mLs by mouth every 4 hours as needed for Pain for up to 7 days. Max Daily Amount: 60 mg  Patient not taking: Reported on 2023  SCOTT Rader CNP   ondansetron (ZOFRAN-ODT) 8 MG TBDP disintegrating tablet Place 1 tablet under the tongue every 8 hours as needed for Nausea or Vomiting  Patient not taking: Reported on 2023   SCOTT Gil CNP   naloxone [facility]) 4 MG/0.1ML LIQD nasal spray 1 spray by Nasal route as needed for Opioid Reversal 23   SCOTT Gil CNP   pantoprazole (PROTONIX) 40 MG tablet Take 1 tablet by mouth in the morning and at bedtime  Patient not taking: Reported on 2023   Wilder Mccray MD       Current medications:    No current facility-administered medications for this visit. No current outpatient medications on file.      Facility-Administered Medications Ordered in Other Visits   Medication Dose

## 2023-08-29 NOTE — PROGRESS NOTES
TRANSFER - IN REPORT:    Verbal report received from Lexx Henson RN on [employer]  being received from  536 for ordered procedure      Report consisted of patient's Situation, Background, Assessment and   Recommendations(SBAR). Information from the following report(s) Procedure Verification was reviewed with the receiving nurse. Opportunity for questions and clarification was provided. Assessment completed upon patient's arrival to unit and care assumed.

## 2023-08-29 NOTE — PROGRESS NOTES
TRANSFER - OUT REPORT:    Verbal report given to RN on Dacia Lawton  being transferred to 5th floor for routine progression of patient care       Report consisted of patient's Situation, Background, Assessment and   Recommendations(SBAR). Information from the following report(s) Nurse Handoff Report was reviewed with the receiving nurse. Lines:   Single Lumen Implantable Port Right Subclavian (Active)   Port A Cath Status Accessed 08/29/23 1026   Criteria for Appropriate Use Total parenteral nutrition 08/29/23 0845   Site Assessment Clean, dry & intact 08/29/23 1026   Line Care Ports disinfected; Tubing changed 08/29/23 0915   Alcohol Cap Used Yes 08/29/23 0845   Date of Last Dressing Change 08/24/23 08/29/23 0845   Dressing Type Transparent; Antimicrobial 08/29/23 0845   Dressing Status Clean, dry & intact 08/29/23 0845   Dressing Intervention New;Dressing changed 08/24/23 0749   Date Accessed  08/24/23 08/29/23 0845   Access Attempts  1 08/24/23 0749   Access Needle Gauge 20 G 08/24/23 0749   Access Needle Length 0.75 inches 08/24/23 0749   Accessed By: Claudio Sow RN 08/24/23 0749   Single Lumen Status Flushed;Blood return noted; Infusing 08/29/23 0915   Date needle changed 08/24/23 08/29/23 0845       Peripheral IV 08/28/23 Right; Anterior Forearm (Active)   Site Assessment Clean, dry & intact 08/29/23 0845   Line Status Infusing 08/29/23 0845   Line Care Connections checked and tightened;Ports disinfected 08/29/23 0845   Phlebitis Assessment No symptoms 08/29/23 0845   Infiltration Assessment 0 08/29/23 0845   Alcohol Cap Used Yes 08/29/23 0845   Dressing Status Clean, dry & intact 08/29/23 0845   Dressing Type Transparent 08/29/23 0845        Opportunity for questions and clarification was provided. RN notified of blood being started in recovery and when to do next set of vitals.       Patient transported with:  Tech and RN

## 2023-08-29 NOTE — H&P
Pre-Procedure Endoscopy Evaluation & History and Physical     Procedure: endoscopy    The indication, alternatives, risks and benefits of the procedure, including but not limited to risks of bleeding, perforation or injury to other organs that may require surgery, aspiration, infection, missing lesions or cancer, medication side effects among others were discussed with the patient who demonstrated understanding and agrees to undergo procedure. Complete for Surveillance Colonoscopy:    Last colonoscopy > or = 3 years ago  No  If no, indicate reason for repeat:  []  Last colonoscopy incomplete  []  Last colonoscopy had inadequate prep  []  Last colonoscopy had > 10 adenomas or piecemeal adenomas  []  Other:_________________________     History of Present Illness: Molly Blake is a 50y.o. year old female seen for hematemesis /history of stomach carcinoma .     Past Medical History:   Diagnosis Date    Abdominal pain 10/3/2022    Alteration in nutrition     per RD note TPN dependent 12 hours per day- [facility]    Anemia     Chemotherapy induced nausea and vomiting 10/14/2022    Colon cancer ([address]) dx 2022    followed by dr Zack Hills    COVID-19 2020    no hospitalization    GERD (gastroesophageal reflux disease)     managed with med    History of blood transfusion     History of colonoscopy 2018    Dr. Joesph Mark, nl (see media note), R     Hx of blood clots 2022    per pt \"small clot on lung identified by CT scan\"  CT Scan impression:- Nonobstructive pulmonary filling defect involving Left Lower Lobe     Hypotension     asymptomatic    Intractable pain 2023    Obstruction of fallopian tube     per pt has \"1 good tube\"    Peritoneal carcinomatosis ([address])     chemo; abdominal pleurx    Weight loss     80lbs weight loss after gastric sleeve       Past Surgical History:   Procedure Laterality Date     SECTION      CYSTOSCOPY Bilateral 2022    CYSTOSCOPY BILATERAL rhythm. No murmurs, gallops, or rubs. PMI nondisplaced. Carotids without bruits. Respiratory:  Comfortable breathing with no accessory muscle use. Clear breath sounds with no wheezes, rales, or rhonchi. GI:  Abdomen nondistended, soft, and nontender. Normal active bowel sounds. No enlargement of the liver or spleen. No masses palpable. Rectal:  Deferred  Musculoskeletal:  No pitting edema of the lower legs. Extremities have good range of motion. No costovertebral tenderness. Neurological:  Gross memory appears intact. Patient is alert and oriented. Psychiatric:  Mood appears appropriate with judgement intact. Lymphatic:  No cervical or supraclavicular adenopathy.        Diagnostic Laboratory Data:  Lab Results   Component Value Date/Time    WBC 11.5 08/29/2023 05:04 AM    HGBPOC 10.2 08/26/2021 11:12 AM    HGB 8.4 08/29/2023 05:04 AM    HCT 28.1 08/29/2023 05:04 AM     08/29/2023 05:04 AM    MCV 93.7 08/29/2023 05:04 AM     Lab Results   Component Value Date/Time     08/29/2023 05:04 AM    K 4.1 08/29/2023 05:04 AM     08/29/2023 05:04 AM    CO2 27 08/29/2023 05:04 AM    BUN 31 08/29/2023 05:04 AM    GFRAA >60 09/27/2022 02:37 AM    GLOB 3.2 08/29/2023 05:04 AM    ALT 49 08/29/2023 05:04 AM     Lab Results   Component Value Date/Time    INR 1.3 08/27/2023 07:16 PM    INR 1.4 09/13/2022 05:56 AM    INR 1.2 08/10/2022 10:39 AM       Assessment:    hematemesis  Plan:    egd    []  N/A  []  Proceed with IV Sedation per hospital policy  []  General Anesthesia  [x]   Eliz Frausto MD   Gastroenterology

## 2023-08-29 NOTE — PROGRESS NOTES
TRANSFER - OUT REPORT:    Verbal report given to TRI Young on [employer]  being transferred to GI lab for ordered procedure       Report consisted of patient's Situation, Background, Assessment and   Recommendations(SBAR). Information from the following report(s) Nurse Handoff Report, Index, MAR, and Recent Results was reviewed with the receiving nurse. Lines:   Single Lumen Implantable Port Right Subclavian (Active)   Port A Cath Status Accessed 08/29/23 0203   Criteria for Appropriate Use Total parenteral nutrition 08/29/23 0203   Site Assessment Clean, dry & intact 08/29/23 0203   Line Care Connections checked and tightened 08/29/23 0203   Alcohol Cap Used Yes 08/29/23 0203   Date of Last Dressing Change 08/24/23 08/29/23 0203   Dressing Type Transparent; Antimicrobial 08/29/23 0203   Dressing Status Clean, dry & intact 08/29/23 0203   Dressing Intervention New;Dressing changed 08/24/23 0749   Date Accessed  08/24/23 08/29/23 0203   Access Attempts  1 08/24/23 0749   Access Needle Gauge 20 G 08/24/23 0910   Access Needle Length 0.75 inches 08/24/23 0910   Accessed By: port lab 08/24/23 0910   Single Lumen Status Flushed; Infusing;Brisk blood return;Lab draw 08/29/23 0509   Date needle changed 08/24/23 08/29/23 0203   De-Access Date 08/10/23 08/10/23 0845   De-Access Time 0830 08/10/23 0845   De-Accessed By TRI JIMENEZ 08/10/23 0845       Peripheral IV 08/28/23 Right; Anterior Forearm (Active)   Site Assessment Clean, dry & intact 08/29/23 0203   Line Status Flushed; Infusing 08/29/23 0203   Line Care Cap changed 08/29/23 0203   Phlebitis Assessment No symptoms 08/29/23 0203   Infiltration Assessment 0 08/29/23 0203   Alcohol Cap Used Yes 08/29/23 0203   Dressing Status Clean, dry & intact 08/29/23 0203   Dressing Type Transparent 08/29/23 0203        Opportunity for questions and clarification was provided.       Patient transported with:  [employer]

## 2023-08-29 NOTE — PROGRESS NOTES
Comprehensive Nutrition Assessment    Type and Reason for Visit: Reassess  Parenteral Nutrition Management (Hospitalists)  Oncology now primary    Nutrition Recommendations/Plan:   Parenteral Nutrition:  Central parenteral nutrition    Central line infusion  Continue: Dex 15%, 5% AA 1.56 L (87SM/HQ)   12 hour cyclic infusion Infuse at 80ml 5612-1371, infuse at 140 ml 8107-8417, infuse at 80 ml/hr 7541-5454  Continue 250 ml 20% lipids daily  To provide: 1608 kcal/d (100% of needs), 78 grams of protein/d (100% of needs), 234 grams of CHO/d and 1810 ml of total volume/d. Above regimen: Intended to meet macronutrient goals  Lytes/L:   Sodium ( 20 meq NaCl), Potassium ( 20 meq KCl) Phosphorus ( 10 mmol KPO4), Calcium (4.5 meq), Magnesium 8 meq   Other additives:   MVI Monday Wednesday Friday due to [employer], MTE  Nutrition Related Medication Management: Thiamine Not indicated  Folic Acid Not indicated  Electrolyte Replacement Protocol PRN Continue for Potassium, Phosphorus, and Magnesium   Repeating Na, K and Phos with next H/H draw given low phos this am, K >4 and elevated Na to determine if replacement indicated today vs deferred. Lab Results   Component Value Date    K 4.0 08/29/2023     Lab Results   Component Value Date    CALCIUM 8.0 (L) 08/29/2023    PHOS 2.3 (L) 08/29/2023     Na 149, Kphos increased in PN tonight, replacement deferred today. Labs:   BMP daily  Mg daily x 3 days at initiation then MWF  Phos daily x 3 days at initiation then MWF    Ionized calcium tomorrow  Triglyceride weekly on Tuesdays  POC Glucoses/SSI Not indicated     Malnutrition Assessment:  Malnutrition Status: At risk for malnutrition (Comment) (PN dependent at baseline, hypermetabolic state)     Nutrition Assessment:  Nutrition History: Pt last d/c from this facility 8/22 resumed home cyclic TPN via PORT (usually infuses 2100 to 0900) last infused at home on 8/26.   On 8/7 encounter PN with pt indicated \"TPN bag in pts

## 2023-08-30 LAB
ABO + RH BLD: NORMAL
ALBUMIN SERPL-MCNC: 1.7 G/DL (ref 3.5–5)
ALBUMIN/GLOB SERPL: 0.5 (ref 0.4–1.6)
ALP SERPL-CCNC: 180 U/L (ref 50–136)
ALT SERPL-CCNC: 41 U/L (ref 12–65)
ANION GAP SERPL CALC-SCNC: 5 MMOL/L (ref 2–11)
AST SERPL-CCNC: 24 U/L (ref 15–37)
BASOPHILS # BLD: 0.1 K/UL (ref 0–0.2)
BASOPHILS NFR BLD: 1 % (ref 0–2)
BILIRUB SERPL-MCNC: 0.4 MG/DL (ref 0.2–1.1)
BLD PROD TYP BPU: NORMAL
BLD PROD TYP BPU: NORMAL
BLOOD BANK DISPENSE STATUS: NORMAL
BLOOD BANK DISPENSE STATUS: NORMAL
BLOOD GROUP ANTIBODIES SERPL: NORMAL
BPU ID: NORMAL
BPU ID: NORMAL
BUN SERPL-MCNC: 29 MG/DL (ref 6–23)
CALCIUM SERPL-MCNC: 8.3 MG/DL (ref 8.3–10.4)
CHLORIDE SERPL-SCNC: 115 MMOL/L (ref 101–110)
CO2 SERPL-SCNC: 23 MMOL/L (ref 21–32)
CREAT SERPL-MCNC: 0.7 MG/DL (ref 0.6–1)
CROSSMATCH RESULT: NORMAL
CROSSMATCH RESULT: NORMAL
DIFFERENTIAL METHOD BLD: ABNORMAL
EOSINOPHIL # BLD: 0 K/UL (ref 0–0.8)
EOSINOPHIL NFR BLD: 0 % (ref 0.5–7.8)
ERYTHROCYTE [DISTWIDTH] IN BLOOD BY AUTOMATED COUNT: 18 % (ref 11.9–14.6)
GLOBULIN SER CALC-MCNC: 3.7 G/DL (ref 2.8–4.5)
GLUCOSE SERPL-MCNC: 231 MG/DL (ref 65–100)
HCT VFR BLD AUTO: 30.4 % (ref 35.8–46.3)
HCT VFR BLD AUTO: 30.5 % (ref 35.8–46.3)
HCT VFR BLD AUTO: 31.4 % (ref 35.8–46.3)
HGB BLD-MCNC: 9.6 G/DL (ref 11.7–15.4)
HGB BLD-MCNC: 9.7 G/DL (ref 11.7–15.4)
HGB BLD-MCNC: 9.9 G/DL (ref 11.7–15.4)
IMM GRANULOCYTES # BLD AUTO: 0.5 K/UL (ref 0–0.5)
IMM GRANULOCYTES NFR BLD AUTO: 5 % (ref 0–5)
LYMPHOCYTES # BLD: 1.3 K/UL (ref 0.5–4.6)
LYMPHOCYTES NFR BLD: 12 % (ref 13–44)
MAGNESIUM SERPL-MCNC: 2.2 MG/DL (ref 1.8–2.4)
MCH RBC QN AUTO: 28.1 PG (ref 26.1–32.9)
MCHC RBC AUTO-ENTMCNC: 31.5 G/DL (ref 31.4–35)
MCV RBC AUTO: 89.2 FL (ref 82–102)
MONOCYTES # BLD: 0.5 K/UL (ref 0.1–1.3)
MONOCYTES NFR BLD: 4 % (ref 4–12)
NEUTS SEG # BLD: 8.2 K/UL (ref 1.7–8.2)
NEUTS SEG NFR BLD: 78 % (ref 43–78)
NRBC # BLD: 0.51 K/UL (ref 0–0.2)
PHOSPHATE SERPL-MCNC: 2.1 MG/DL (ref 2.5–4.5)
PLATELET # BLD AUTO: 189 K/UL (ref 150–450)
PMV BLD AUTO: 11.7 FL (ref 9.4–12.3)
POTASSIUM SERPL-SCNC: 4 MMOL/L (ref 3.5–5.1)
PROT SERPL-MCNC: 5.4 G/DL (ref 6.3–8.2)
RBC # BLD AUTO: 3.42 M/UL (ref 4.05–5.2)
SODIUM SERPL-SCNC: 143 MMOL/L (ref 133–143)
SPECIMEN EXP DATE BLD: NORMAL
UNIT DIVISION: 0
UNIT DIVISION: 0
WBC # BLD AUTO: 10.6 K/UL (ref 4.3–11.1)

## 2023-08-30 PROCEDURE — 2500000003 HC RX 250 WO HCPCS: Performed by: INTERNAL MEDICINE

## 2023-08-30 PROCEDURE — 1100000003 HC PRIVATE W/ TELEMETRY

## 2023-08-30 PROCEDURE — 6360000002 HC RX W HCPCS: Performed by: FAMILY MEDICINE

## 2023-08-30 PROCEDURE — 6360000002 HC RX W HCPCS: Performed by: INTERNAL MEDICINE

## 2023-08-30 PROCEDURE — 36591 DRAW BLOOD OFF VENOUS DEVICE: CPT

## 2023-08-30 PROCEDURE — 6370000000 HC RX 637 (ALT 250 FOR IP): Performed by: NURSE PRACTITIONER

## 2023-08-30 PROCEDURE — 99233 SBSQ HOSP IP/OBS HIGH 50: CPT | Performed by: INTERNAL MEDICINE

## 2023-08-30 PROCEDURE — 85025 COMPLETE CBC W/AUTO DIFF WBC: CPT

## 2023-08-30 PROCEDURE — 83735 ASSAY OF MAGNESIUM: CPT

## 2023-08-30 PROCEDURE — 84100 ASSAY OF PHOSPHORUS: CPT

## 2023-08-30 PROCEDURE — 85014 HEMATOCRIT: CPT

## 2023-08-30 PROCEDURE — 85018 HEMOGLOBIN: CPT

## 2023-08-30 PROCEDURE — 99232 SBSQ HOSP IP/OBS MODERATE 35: CPT | Performed by: NURSE PRACTITIONER

## 2023-08-30 PROCEDURE — 2580000003 HC RX 258: Performed by: FAMILY MEDICINE

## 2023-08-30 PROCEDURE — A4216 STERILE WATER/SALINE, 10 ML: HCPCS | Performed by: FAMILY MEDICINE

## 2023-08-30 PROCEDURE — 2580000003 HC RX 258: Performed by: INTERNAL MEDICINE

## 2023-08-30 PROCEDURE — 1100000000 HC RM PRIVATE

## 2023-08-30 PROCEDURE — C9113 INJ PANTOPRAZOLE SODIUM, VIA: HCPCS | Performed by: FAMILY MEDICINE

## 2023-08-30 PROCEDURE — 80053 COMPREHEN METABOLIC PANEL: CPT

## 2023-08-30 PROCEDURE — 6360000002 HC RX W HCPCS: Performed by: NURSE PRACTITIONER

## 2023-08-30 PROCEDURE — 2500000003 HC RX 250 WO HCPCS: Performed by: NURSE PRACTITIONER

## 2023-08-30 RX ORDER — LORAZEPAM 1 MG/1
1 TABLET ORAL EVERY 4 HOURS PRN
Status: DISCONTINUED | OUTPATIENT
Start: 2023-08-30 | End: 2023-09-04 | Stop reason: HOSPADM

## 2023-08-30 RX ADMIN — SODIUM CHLORIDE, PRESERVATIVE FREE 10 ML: 5 INJECTION INTRAVENOUS at 08:14

## 2023-08-30 RX ADMIN — POTASSIUM CHLORIDE: 2 INJECTION, SOLUTION, CONCENTRATE INTRAVENOUS at 18:13

## 2023-08-30 RX ADMIN — CEFEPIME 2000 MG: 2 INJECTION, POWDER, FOR SOLUTION INTRAVENOUS at 14:08

## 2023-08-30 RX ADMIN — BISACODYL 10 MG: 10 SUPPOSITORY RECTAL at 17:27

## 2023-08-30 RX ADMIN — POTASSIUM PHOSPHATE, MONOBASIC POTASSIUM PHOSPHATE, DIBASIC 15 MMOL: 224; 236 INJECTION, SOLUTION, CONCENTRATE INTRAVENOUS at 12:51

## 2023-08-30 RX ADMIN — METOCLOPRAMIDE 10 MG: 5 INJECTION, SOLUTION INTRAMUSCULAR; INTRAVENOUS at 05:59

## 2023-08-30 RX ADMIN — ONDANSETRON 4 MG: 2 INJECTION INTRAMUSCULAR; INTRAVENOUS at 08:03

## 2023-08-30 RX ADMIN — HYDROMORPHONE HYDROCHLORIDE 1 MG: 1 INJECTION, SOLUTION INTRAMUSCULAR; INTRAVENOUS; SUBCUTANEOUS at 20:56

## 2023-08-30 RX ADMIN — HYDROMORPHONE HYDROCHLORIDE 1 MG: 1 INJECTION, SOLUTION INTRAMUSCULAR; INTRAVENOUS; SUBCUTANEOUS at 14:19

## 2023-08-30 RX ADMIN — ONDANSETRON 4 MG: 2 INJECTION INTRAMUSCULAR; INTRAVENOUS at 01:41

## 2023-08-30 RX ADMIN — HYDROMORPHONE HYDROCHLORIDE 1 MG: 1 INJECTION, SOLUTION INTRAMUSCULAR; INTRAVENOUS; SUBCUTANEOUS at 16:35

## 2023-08-30 RX ADMIN — CEFEPIME 2000 MG: 2 INJECTION, POWDER, FOR SOLUTION INTRAVENOUS at 22:56

## 2023-08-30 RX ADMIN — VANCOMYCIN HYDROCHLORIDE 1000 MG: 1 INJECTION, POWDER, LYOPHILIZED, FOR SOLUTION INTRAVENOUS at 08:03

## 2023-08-30 RX ADMIN — SODIUM CHLORIDE, PRESERVATIVE FREE 40 MG: 5 INJECTION INTRAVENOUS at 04:31

## 2023-08-30 RX ADMIN — HYDROMORPHONE HYDROCHLORIDE 1 MG: 1 INJECTION, SOLUTION INTRAMUSCULAR; INTRAVENOUS; SUBCUTANEOUS at 08:06

## 2023-08-30 RX ADMIN — METOCLOPRAMIDE 10 MG: 5 INJECTION, SOLUTION INTRAMUSCULAR; INTRAVENOUS at 10:34

## 2023-08-30 RX ADMIN — SODIUM CHLORIDE, PRESERVATIVE FREE 10 ML: 5 INJECTION INTRAVENOUS at 20:49

## 2023-08-30 RX ADMIN — LORAZEPAM 1 MG: 1 TABLET ORAL at 14:19

## 2023-08-30 RX ADMIN — I.V. FAT EMULSION 250 ML: 20 EMULSION INTRAVENOUS at 18:13

## 2023-08-30 RX ADMIN — ONDANSETRON 4 MG: 2 INJECTION INTRAMUSCULAR; INTRAVENOUS at 16:45

## 2023-08-30 RX ADMIN — CEFEPIME 2000 MG: 2 INJECTION, POWDER, FOR SOLUTION INTRAVENOUS at 06:00

## 2023-08-30 RX ADMIN — HYDROMORPHONE HYDROCHLORIDE 1 MG: 1 INJECTION, SOLUTION INTRAMUSCULAR; INTRAVENOUS; SUBCUTANEOUS at 01:41

## 2023-08-30 RX ADMIN — HYDROMORPHONE HYDROCHLORIDE 1 MG: 1 INJECTION, SOLUTION INTRAMUSCULAR; INTRAVENOUS; SUBCUTANEOUS at 10:29

## 2023-08-30 RX ADMIN — SODIUM CHLORIDE, PRESERVATIVE FREE 40 MG: 5 INJECTION INTRAVENOUS at 16:35

## 2023-08-30 RX ADMIN — METOCLOPRAMIDE 10 MG: 5 INJECTION, SOLUTION INTRAMUSCULAR; INTRAVENOUS at 20:59

## 2023-08-30 RX ADMIN — HYDROMORPHONE HYDROCHLORIDE 1 MG: 1 INJECTION, SOLUTION INTRAMUSCULAR; INTRAVENOUS; SUBCUTANEOUS at 05:59

## 2023-08-30 ASSESSMENT — PAIN DESCRIPTION - DESCRIPTORS
DESCRIPTORS: ACHING

## 2023-08-30 ASSESSMENT — PAIN DESCRIPTION - PAIN TYPE
TYPE: ACUTE PAIN
TYPE: ACUTE PAIN;DEEP SOMATIC PAIN
TYPE: ACUTE PAIN
TYPE: ACUTE PAIN;DEEP SOMATIC PAIN
TYPE: ACUTE PAIN
TYPE: ACUTE PAIN

## 2023-08-30 ASSESSMENT — PAIN DESCRIPTION - ONSET
ONSET: ON-GOING
ONSET: AWAKENED FROM SLEEP
ONSET: PROGRESSIVE
ONSET: AWAKENED FROM SLEEP
ONSET: PROGRESSIVE
ONSET: AWAKENED FROM SLEEP
ONSET: PROGRESSIVE

## 2023-08-30 ASSESSMENT — PAIN DESCRIPTION - FREQUENCY
FREQUENCY: CONTINUOUS

## 2023-08-30 ASSESSMENT — PAIN SCALES - GENERAL
PAINLEVEL_OUTOF10: 0
PAINLEVEL_OUTOF10: 8
PAINLEVEL_OUTOF10: 7
PAINLEVEL_OUTOF10: 6
PAINLEVEL_OUTOF10: 7
PAINLEVEL_OUTOF10: 0
PAINLEVEL_OUTOF10: 7
PAINLEVEL_OUTOF10: 7
PAINLEVEL_OUTOF10: 8
PAINLEVEL_OUTOF10: 8

## 2023-08-30 ASSESSMENT — PAIN DESCRIPTION - LOCATION
LOCATION: ABDOMEN

## 2023-08-30 ASSESSMENT — PAIN - FUNCTIONAL ASSESSMENT
PAIN_FUNCTIONAL_ASSESSMENT: ACTIVITIES ARE NOT PREVENTED
PAIN_FUNCTIONAL_ASSESSMENT: PREVENTS OR INTERFERES SOME ACTIVE ACTIVITIES AND ADLS
PAIN_FUNCTIONAL_ASSESSMENT: ACTIVITIES ARE NOT PREVENTED

## 2023-08-30 ASSESSMENT — PAIN DESCRIPTION - ORIENTATION
ORIENTATION: LEFT;RIGHT
ORIENTATION: MID
ORIENTATION: MID
ORIENTATION: LEFT;RIGHT
ORIENTATION: ANTERIOR;MID;LEFT;RIGHT
ORIENTATION: LEFT;RIGHT

## 2023-08-30 NOTE — PROGRESS NOTES
Comprehensive Nutrition Assessment    Type and Reason for Visit: Reassess  Parenteral Nutrition Management (Hospitalists)  Oncology now primary    Nutrition Recommendations/Plan:   Parenteral Nutrition:  Central parenteral nutrition    Central line infusion  Continue: Dex 15%, 5% AA 1.56 L/day  12 hour cyclic infusion Infuse at 80ml 0319-6932, infuse at 140 ml 1177-4122, infuse at 80 ml/hr 5317-5636  Continue 250 ml 20% lipids daily  To provide: 1608 kcal/d (100% of needs), 78 grams of protein/d (100% of needs), 234 grams of CHO/d and 1810 ml of total volume/d. Above regimen: Intended to meet macronutrient goals  Lytes/L:   Sodium ( 20 meq NaCl), Potassium ( 20 meq KCl) Phosphorus ( 12.5 mmol KPO4), Calcium (4.5 meq), Magnesium 8 meq   Increasing Kphos again tonight. Other additives:   MVI Monday Wednesday Friday due to national shortages, MTE  Nutrition Related Medication Management: Thiamine Not indicated  Folic Acid Not indicated  Electrolyte Replacement Protocol PRN Continue for Potassium, Phosphorus, and Magnesium   8/30: Kphos replacement as discussed with NP vs NaPhos per standard protocol. Labs:   BMP daily  Mg MWF  Phos MWF    Triglyceride weekly on Tuesdays  POC Glucoses/SSI Not indicated     Malnutrition Assessment:  Malnutrition Status: At risk for malnutrition (Comment) (PN dependent at baseline, hypermetabolic state)     Nutrition Assessment:  Nutrition History: Pt last d/c from this facility 8/22 on more concentrated lower total volume formulation and resumed home cyclic TPN via PORT (usually infuses 2100 to 0900) last infused at home on 8/26. On 8/7 encounter PN with pt indicated \"TPN bag in pts room , dex 220g, protein 85g, lipids 20% 50g. \"   PN dependent since October of 2022.   Weight hx per inpt RD notes and outpt Onc visits as follows: 113# bed scale wt Oct at PN start, additional wt hx per Oncology visits: 112# 1/12/23, 117# march RD encounter outpt, 121# 4/13/23, 126# 5/4/23, 128# 6/1/23, 125# 4/12/23, 131# 7/13/23, 142# 7/26 and 7/27/23, 138# 8/3/23, 151# 8/10/23. Do You Have Any Cultural, Judaism, or Ethnic Food Preferences?: No   Nutrition Background:       PMH remarkable for GERD, metastatic carcinoma of unknown primary (most c.w GI) peritoneal carcinomatosis on TPN, malignant ascites w PeritX catheter, right pleural effusion, r sided hydronephrosis, esophagitis, chronic vomiting, gastric sleeve, ulcerated fungating mass on EGD 8/15. Reglan pump established between inpt admissions. Presented this encounter with coffee ground emesis and abdominal pain. Admitted with intractable nausea with emesis, hypernatremia, peritoneal carcinoma, leukocytosis, bilateral hydronephrosis, esophagitis. GI consulted, EGD 8/29: Findings large ulcerated obstructing stomach mass consistent with previous history of adenocarcinoma of stomach with active oozing of blood sp apc and hemospray . Admitted 8/27 overnight, PN started inpt 3/44 cyclic per home regimen. Pt getting up with therapy at RD visit, denies any difficulties with PN infusion last pm. Noted to have increasing emesis recorded 1025 ml recorded past 24 hours, 100ml this am.  Pt taking ice chips, discussed with Alonso Tirado RN attempting to discern actual emesis vs ice consumed. Discussed with Kelechi Carr NP.       Abdominal Status (last documented by nursing):   Last BM (including prior to admit):  (per pt a couple weeks ago), GI Symptoms: Nausea, Vomiting, Distention   Pertinent Medications: maxipime, fentanyl, protonix, glycolax (held 8/29, 8/30), scopolamine, senokot (remains held), vanc  Continuous: none  IVF: stopped w PN start  Electrolyte Replacement:  active for K., Mg and Phos  Pertinent administered PRN: reglan (last admin 8/30), zofran (last admin 8/30)  Pertinent Labs:   Lab Results   Component Value Date/Time     08/30/2023 04:22 AM    K 4.0 08/30/2023 04:22 AM     08/30/2023 04:22 AM    CO2 23 08/30/2023 Inadequate oral intake related to altered GI function as evidenced by NPO or clear liquid status due to medical condition (PN dependent at baseline d/t peritoneal carcinomatosis)  Nutrition Interventions:   Food and/or Nutrient Delivery: Modify Parenteral Nutrition     Coordination of Nutrition Care: Continue to monitor while inpatient      Goals:   Previous Goal Met: Goal(s) Achieved  Active Goal:  (Maintain TPN for primary needs as appropriate with goals of care)       Nutrition Monitoring and Evaluation:      Food/Nutrient Intake Outcomes: Parenteral Nutrition Intake/Tolerance  Physical Signs/Symptoms Outcomes: Biochemical Data, GI Status, Nausea or Vomiting, Fluid Status or Edema, Weight    Discharge Planning:    Parenteral Nutrition    [facility]

## 2023-08-30 NOTE — PROGRESS NOTES
[facility] Hematology & Oncology        Inpatient Hematology / Oncology Progress Note    Reason for Admission:  Tachycardia [R00.0]  Metastatic carcinoma ([address]) [C79.9]  Coffee ground emesis [K92.0]  Hematemesis with nausea [K92.0]  Leukocytosis, unspecified type [D72.829]    24 Hour Events:  Afebrile, tachycardic and hypertensive at times  Hgb stable  Ongoing pain and N/V  Continues TPN  Up ambulating in room    Transfusions: None  Replacements: None    ROS:  Constitutional: Positive for malaise and fatigue; negative for fever, chills, weakness. CV: Negative for chest pain, palpitations, edema. Respiratory: Negative for dyspnea, cough, wheezing. GI: Positive for nausea and abdominal pain; negative for diarrhea. 10 point review of systems is otherwise negative with the exception of the elements mentioned above in the HPI.        No Known Allergies  Past Medical History:   Diagnosis Date    Abdominal pain 10/3/2022    Alteration in nutrition     per RD note TPN dependent 12 hours per day- [facility]    Anemia     Chemotherapy induced nausea and vomiting 10/14/2022    Colon cancer ([address]) dx 2022    followed by dr Elayne Burgess    COVID-19 2020    no hospitalization    GERD (gastroesophageal reflux disease)     managed with med    History of blood transfusion     History of colonoscopy 2018    louis Holloway (see media note), R     Hx of blood clots 2022    per pt \"small clot on lung identified by CT scan\"  CT Scan impression:- Nonobstructive pulmonary filling defect involving Left Lower Lobe     Hypotension     asymptomatic    Intractable pain 2023    Obstruction of fallopian tube     per pt has \"1 good tube\"    Peritoneal carcinomatosis ([address])     chemo; abdominal pleurx    Weight loss     80lbs weight loss after gastric sleeve     Past Surgical History:   Procedure Laterality Date     SECTION  2009    CYSTOSCOPY Bilateral 2022    CYSTOSCOPY BILATERAL RETROGRADE PYELOGRAM Oral Q3H PRN Rosario Jacobson PA-C        PN-Adult Premix 5/15 - Central   IntraVENous Continuous TPN Tony Monroe MD   Stopped at 08/30/23 4791    scopolamine (TRANSDERM-SCOP) transdermal patch 1 patch  1 patch TransDERmal Q72H SCOTT Martinez CNP   1 patch at 08/29/23 1404    vancomycin (VANCOCIN) 1,000 mg in sodium chloride 0.9 % 250 mL IVPB (Plpr1Nbg)  1,000 mg IntraVENous Q12H Tony Monroe MD   Stopped at 08/30/23 6138    bisacodyl (DULCOLAX) suppository 10 mg  10 mg Rectal Daily PRN SCOTT Martinez CNP        HYDROmorphone HCl PF (DILAUDID) injection 1 mg  1 mg IntraVENous Q2H PRN SCOTT Najera CNP   1 mg at 08/30/23 1029    metoclopramide (REGLAN) injection 10 mg  10 mg IntraVENous Q4H PRN SCOTT Najera CNP   10 mg at 08/30/23 1034    fat emulsion (INTRALIPID/NUTRILIPID) 20 % infusion 250 mL  250 mL IntraVENous Daily Tony Monroe MD   Stopped at 08/30/23 0558    potassium chloride 20 mEq/50 mL IVPB (Central Line)  20 mEq IntraVENous PRN Tony Monroe MD        Or    potassium chloride 10 mEq/100 mL IVPB (Peripheral Line)  10 mEq IntraVENous PRN Tony Monroe MD        magnesium sulfate 2000 mg in 50 mL IVPB premix  2,000 mg IntraVENous PRN Tony Monroe MD        sodium phosphate 10 mmol in sodium chloride 0.9 % 250 mL IVPB  10 mmol IntraVENous PRN Tony Monroe MD        Or    sodium phosphate 15 mmol in sodium chloride 0.9 % 250 mL IVPB  15 mmol IntraVENous PRN Tony Monroe MD        Or    sodium phosphate 20 mmol in sodium chloride 0.9 % 500 mL IVPB  20 mmol IntraVENous PRN Tony Monroe MD        ceFEPIme (MAXIPIME) 2,000 mg in sodium chloride 0.9 % 100 mL IVPB (mini-bag)  2,000 mg IntraVENous Q8H Yandel Carrasquillo DO   Stopped at 08/30/23 0630    sennosides-docusate sodium (SENOKOT-S) 8.6-50 MG tablet 2 tablet  2 tablet Oral BID Wade Drier, APRN - CNP        polyethylene glycol (GLYCOLAX) packet 17 g  17 g Oral Daily Wade Hood, obstruction with worsening pain. Tx options are limited d/t poor po intake. She was recently admitted for melena and underwent EGD that showed fungating mass concerning for POD as she recently had EGD that was unremarkable. Given findings, Dr Oksana Quezada discussed changing therapy but options again limited by ability to take in PO. She agreed to continue with single-agent taxol and has declined palliative/hospice services after multiple conversations. She received Taxol 8/24. She was able to obtain home Reglan pump. Ms Marcus Benitez presented on day of admission with hematemesis. CT CAP showed stable BL pleural effusions and large multiloculated fluid collection in abdomen with stable drainage cathter as well as similar/stable BL hydronephrosis and stable findings of GERD. CT did note moderate-large stool burden. Hgb on arrival 8.0 and down to 7.4 this AM (likely component of dilution - on IVF). GI consulted. Leukocytosis noted on admission (BC and procal WNL). On Cefepime and Vancomycin. Oncology consulted to assume care. RECOMMENDATIONS:    Metastatic cancer of unknown primary (peritoneal carcinomatosis)  - Most recently receiving Taxol/Cyramza (held since June)  - Had recent [email or web address] [place] discussion due to concerns for POD after gastric mass noted on EGD. Dr Oksana Quezada discussed limitations in further treatment as well as concern for POD on taxol. Declined hospice and wished to continue with single-agent Taxol (last received 8/24)  8/29 Palliative care following and discussing goals of care, per today's note patient wishes to continue treatment      Hematemesis  - GI consulted, had recent EGD with gastric mass and has known esophagitis/GERD  - On PPI BID  8/29 EGD-ulcerated stomach mass with active bleeding, s/p apc and hemospray continue PPI IV bid, keep hgb >8 per GI  8/30 Hgb 9.6 - stable. No further bleeding reported.       Anemia, blood loss secondary to gastric mass  - Secondary to recent chemo vs hematemesis - monitor  -

## 2023-08-30 NOTE — PROGRESS NOTES
END OF SHIFT SUMMARY: 7a-7p      Additional events this shift:   Dilaudid IV given x 4  Ativan SL given x1  Reglan IV given x1  Zofran IV given x 2  Doculax suppository given for constipation    I/Os:  +/- this shift: Inaccurate I and Os   I/O last 3 completed shifts:   In: 5846.6 [P.O.:50; I.V.:5418.3; Blood:378.3]  Out: 3100 [YQNQD:1887; Emesis/NG output:1325]  I/O this shift:  In: 4847 [I.V.:3284]  Out: 400 [Urine:300; Emesis/NG output:100]          Bedside shift report given to [place] TRI SANCHEZ

## 2023-08-30 NOTE — PROGRESS NOTES
EOS:      -pt refused senna due to nausea  -pt given dilaudid 3x for pain  -pt given reglan 2x for n/v  -pt given zofran 1x for n/v  -pt hgb: 9.6  -next h&h due at noon  -TPN stopped at 6am per order  -pt resting in bed  -no needs at this time  -vss

## 2023-08-31 ENCOUNTER — HOSPITAL ENCOUNTER (OUTPATIENT)
Dept: INFUSION THERAPY | Age: 48
End: 2023-08-31

## 2023-08-31 LAB
ALBUMIN SERPL-MCNC: 1.7 G/DL (ref 3.5–5)
ALBUMIN/GLOB SERPL: 0.5 (ref 0.4–1.6)
ALP SERPL-CCNC: 172 U/L (ref 50–136)
ALT SERPL-CCNC: 33 U/L (ref 12–65)
ANION GAP SERPL CALC-SCNC: 4 MMOL/L (ref 2–11)
AST SERPL-CCNC: 20 U/L (ref 15–37)
BASOPHILS # BLD: 0 K/UL (ref 0–0.2)
BASOPHILS NFR BLD: 0 % (ref 0–2)
BILIRUB SERPL-MCNC: 0.4 MG/DL (ref 0.2–1.1)
BUN SERPL-MCNC: 25 MG/DL (ref 6–23)
CALCIUM SERPL-MCNC: 8 MG/DL (ref 8.3–10.4)
CHLORIDE SERPL-SCNC: 117 MMOL/L (ref 101–110)
CO2 SERPL-SCNC: 25 MMOL/L (ref 21–32)
CREAT SERPL-MCNC: 0.5 MG/DL (ref 0.6–1)
DIFFERENTIAL METHOD BLD: ABNORMAL
EOSINOPHIL # BLD: 0 K/UL (ref 0–0.8)
EOSINOPHIL NFR BLD: 0 % (ref 0.5–7.8)
ERYTHROCYTE [DISTWIDTH] IN BLOOD BY AUTOMATED COUNT: 18 % (ref 11.9–14.6)
GLOBULIN SER CALC-MCNC: 3.3 G/DL (ref 2.8–4.5)
GLUCOSE SERPL-MCNC: 182 MG/DL (ref 65–100)
HCT VFR BLD AUTO: 28.1 % (ref 35.8–46.3)
HGB BLD-MCNC: 8.8 G/DL (ref 11.7–15.4)
IMM GRANULOCYTES # BLD AUTO: 0.3 K/UL (ref 0–0.5)
IMM GRANULOCYTES NFR BLD AUTO: 4 % (ref 0–5)
LYMPHOCYTES # BLD: 1.1 K/UL (ref 0.5–4.6)
LYMPHOCYTES NFR BLD: 15 % (ref 13–44)
MAGNESIUM SERPL-MCNC: 1.8 MG/DL (ref 1.8–2.4)
MCH RBC QN AUTO: 28.2 PG (ref 26.1–32.9)
MCHC RBC AUTO-ENTMCNC: 31.3 G/DL (ref 31.4–35)
MCV RBC AUTO: 90.1 FL (ref 82–102)
MONOCYTES # BLD: 0.6 K/UL (ref 0.1–1.3)
MONOCYTES NFR BLD: 7 % (ref 4–12)
NEUTS SEG # BLD: 5.6 K/UL (ref 1.7–8.2)
NEUTS SEG NFR BLD: 74 % (ref 43–78)
NRBC # BLD: 0.46 K/UL (ref 0–0.2)
PHOSPHATE SERPL-MCNC: 2.8 MG/DL (ref 2.5–4.5)
PLATELET # BLD AUTO: 162 K/UL (ref 150–450)
PMV BLD AUTO: 11.2 FL (ref 9.4–12.3)
POTASSIUM SERPL-SCNC: 3.8 MMOL/L (ref 3.5–5.1)
PROT SERPL-MCNC: 5 G/DL (ref 6.3–8.2)
RBC # BLD AUTO: 3.12 M/UL (ref 4.05–5.2)
SODIUM SERPL-SCNC: 146 MMOL/L (ref 133–143)
WBC # BLD AUTO: 7.6 K/UL (ref 4.3–11.1)

## 2023-08-31 PROCEDURE — 6360000002 HC RX W HCPCS: Performed by: INTERNAL MEDICINE

## 2023-08-31 PROCEDURE — 97161 PT EVAL LOW COMPLEX 20 MIN: CPT

## 2023-08-31 PROCEDURE — 2580000003 HC RX 258: Performed by: INTERNAL MEDICINE

## 2023-08-31 PROCEDURE — 99232 SBSQ HOSP IP/OBS MODERATE 35: CPT | Performed by: PHYSICIAN ASSISTANT

## 2023-08-31 PROCEDURE — 85025 COMPLETE CBC W/AUTO DIFF WBC: CPT

## 2023-08-31 PROCEDURE — 99233 SBSQ HOSP IP/OBS HIGH 50: CPT | Performed by: INTERNAL MEDICINE

## 2023-08-31 PROCEDURE — 6370000000 HC RX 637 (ALT 250 FOR IP): Performed by: PHYSICIAN ASSISTANT

## 2023-08-31 PROCEDURE — 1100000000 HC RM PRIVATE

## 2023-08-31 PROCEDURE — A4216 STERILE WATER/SALINE, 10 ML: HCPCS | Performed by: FAMILY MEDICINE

## 2023-08-31 PROCEDURE — 83735 ASSAY OF MAGNESIUM: CPT

## 2023-08-31 PROCEDURE — 6360000002 HC RX W HCPCS: Performed by: FAMILY MEDICINE

## 2023-08-31 PROCEDURE — 2580000003 HC RX 258: Performed by: FAMILY MEDICINE

## 2023-08-31 PROCEDURE — 80053 COMPREHEN METABOLIC PANEL: CPT

## 2023-08-31 PROCEDURE — 97165 OT EVAL LOW COMPLEX 30 MIN: CPT

## 2023-08-31 PROCEDURE — 2500000003 HC RX 250 WO HCPCS: Performed by: NURSE PRACTITIONER

## 2023-08-31 PROCEDURE — 1100000003 HC PRIVATE W/ TELEMETRY

## 2023-08-31 PROCEDURE — 84100 ASSAY OF PHOSPHORUS: CPT

## 2023-08-31 PROCEDURE — C9113 INJ PANTOPRAZOLE SODIUM, VIA: HCPCS | Performed by: FAMILY MEDICINE

## 2023-08-31 PROCEDURE — 6360000002 HC RX W HCPCS: Performed by: NURSE PRACTITIONER

## 2023-08-31 PROCEDURE — 2500000003 HC RX 250 WO HCPCS: Performed by: INTERNAL MEDICINE

## 2023-08-31 PROCEDURE — 97530 THERAPEUTIC ACTIVITIES: CPT

## 2023-08-31 PROCEDURE — 97535 SELF CARE MNGMENT TRAINING: CPT

## 2023-08-31 RX ORDER — FENTANYL 75 UG/H
1 PATCH TRANSDERMAL
Status: DISCONTINUED | OUTPATIENT
Start: 2023-08-31 | End: 2023-09-04 | Stop reason: HOSPADM

## 2023-08-31 RX ADMIN — ONDANSETRON 4 MG: 2 INJECTION INTRAMUSCULAR; INTRAVENOUS at 18:16

## 2023-08-31 RX ADMIN — SODIUM CHLORIDE, PRESERVATIVE FREE 40 MG: 5 INJECTION INTRAVENOUS at 18:18

## 2023-08-31 RX ADMIN — HYDROMORPHONE HYDROCHLORIDE 1 MG: 1 INJECTION, SOLUTION INTRAMUSCULAR; INTRAVENOUS; SUBCUTANEOUS at 01:02

## 2023-08-31 RX ADMIN — SODIUM CHLORIDE, PRESERVATIVE FREE 10 ML: 5 INJECTION INTRAVENOUS at 19:50

## 2023-08-31 RX ADMIN — ONDANSETRON 4 MG: 2 INJECTION INTRAMUSCULAR; INTRAVENOUS at 01:02

## 2023-08-31 RX ADMIN — METOCLOPRAMIDE 10 MG: 5 INJECTION, SOLUTION INTRAMUSCULAR; INTRAVENOUS at 05:43

## 2023-08-31 RX ADMIN — HYDROMORPHONE HYDROCHLORIDE 1 MG: 1 INJECTION, SOLUTION INTRAMUSCULAR; INTRAVENOUS; SUBCUTANEOUS at 10:37

## 2023-08-31 RX ADMIN — HYDROMORPHONE HYDROCHLORIDE 1 MG: 1 INJECTION, SOLUTION INTRAMUSCULAR; INTRAVENOUS; SUBCUTANEOUS at 22:35

## 2023-08-31 RX ADMIN — ONDANSETRON 4 MG: 2 INJECTION INTRAMUSCULAR; INTRAVENOUS at 08:18

## 2023-08-31 RX ADMIN — HYDROMORPHONE HYDROCHLORIDE 1 MG: 1 INJECTION, SOLUTION INTRAMUSCULAR; INTRAVENOUS; SUBCUTANEOUS at 14:03

## 2023-08-31 RX ADMIN — CEFEPIME 2000 MG: 2 INJECTION, POWDER, FOR SOLUTION INTRAVENOUS at 22:35

## 2023-08-31 RX ADMIN — HYDROMORPHONE HYDROCHLORIDE 1 MG: 1 INJECTION, SOLUTION INTRAMUSCULAR; INTRAVENOUS; SUBCUTANEOUS at 18:16

## 2023-08-31 RX ADMIN — METOCLOPRAMIDE 10 MG: 5 INJECTION, SOLUTION INTRAMUSCULAR; INTRAVENOUS at 14:03

## 2023-08-31 RX ADMIN — HYDROMORPHONE HYDROCHLORIDE 1 MG: 1 INJECTION, SOLUTION INTRAMUSCULAR; INTRAVENOUS; SUBCUTANEOUS at 08:18

## 2023-08-31 RX ADMIN — METOCLOPRAMIDE 10 MG: 5 INJECTION, SOLUTION INTRAMUSCULAR; INTRAVENOUS at 19:55

## 2023-08-31 RX ADMIN — HYDROMORPHONE HYDROCHLORIDE 1 MG: 1 INJECTION, SOLUTION INTRAMUSCULAR; INTRAVENOUS; SUBCUTANEOUS at 05:42

## 2023-08-31 RX ADMIN — CEFEPIME 2000 MG: 2 INJECTION, POWDER, FOR SOLUTION INTRAVENOUS at 05:43

## 2023-08-31 RX ADMIN — CEFEPIME 2000 MG: 2 INJECTION, POWDER, FOR SOLUTION INTRAVENOUS at 15:05

## 2023-08-31 RX ADMIN — I.V. FAT EMULSION 250 ML: 20 EMULSION INTRAVENOUS at 18:11

## 2023-08-31 RX ADMIN — SODIUM CHLORIDE, PRESERVATIVE FREE 10 ML: 5 INJECTION INTRAVENOUS at 08:17

## 2023-08-31 RX ADMIN — POTASSIUM CHLORIDE: 2 INJECTION, SOLUTION, CONCENTRATE INTRAVENOUS at 18:30

## 2023-08-31 RX ADMIN — SODIUM CHLORIDE, PRESERVATIVE FREE 40 MG: 5 INJECTION INTRAVENOUS at 05:42

## 2023-08-31 ASSESSMENT — PAIN DESCRIPTION - ONSET
ONSET: ON-GOING

## 2023-08-31 ASSESSMENT — PAIN DESCRIPTION - DESCRIPTORS
DESCRIPTORS: ACHING;DISCOMFORT;TIGHTNESS
DESCRIPTORS: ACHING;STABBING
DESCRIPTORS: ACHING;SHOOTING
DESCRIPTORS: THROBBING;ACHING;DISCOMFORT
DESCRIPTORS: ACHING;DISCOMFORT;TIGHTNESS
DESCRIPTORS: ACHING;THROBBING;DISCOMFORT
DESCRIPTORS: ACHING

## 2023-08-31 ASSESSMENT — PAIN SCALES - GENERAL
PAINLEVEL_OUTOF10: 9
PAINLEVEL_OUTOF10: 0
PAINLEVEL_OUTOF10: 4
PAINLEVEL_OUTOF10: 8
PAINLEVEL_OUTOF10: 8
PAINLEVEL_OUTOF10: 7
PAINLEVEL_OUTOF10: 8
PAINLEVEL_OUTOF10: 7
PAINLEVEL_OUTOF10: 0
PAINLEVEL_OUTOF10: 8
PAINLEVEL_OUTOF10: 7
PAINLEVEL_OUTOF10: 0

## 2023-08-31 ASSESSMENT — PAIN DESCRIPTION - FREQUENCY
FREQUENCY: CONTINUOUS

## 2023-08-31 ASSESSMENT — PAIN DESCRIPTION - LOCATION
LOCATION: GENERALIZED
LOCATION: ABDOMEN
LOCATION: ABDOMEN
LOCATION: ABDOMEN;GENERALIZED
LOCATION: ABDOMEN

## 2023-08-31 ASSESSMENT — PAIN DESCRIPTION - PAIN TYPE
TYPE: DEEP SOMATIC PAIN

## 2023-08-31 ASSESSMENT — PAIN - FUNCTIONAL ASSESSMENT
PAIN_FUNCTIONAL_ASSESSMENT: PREVENTS OR INTERFERES SOME ACTIVE ACTIVITIES AND ADLS

## 2023-08-31 ASSESSMENT — PAIN DESCRIPTION - ORIENTATION
ORIENTATION: ANTERIOR
ORIENTATION: ANTERIOR

## 2023-08-31 NOTE — PROGRESS NOTES
ACUTE PHYSICAL THERAPY GOALS:   (Developed with and agreed upon by patient and/or caregiver.)    (1.) Harika Rice  will move from supine to sit and sit to supine  with INDEPENDENCE within 7 treatment day(s). (2.) Harika Rice will transfer from bed to chair and chair to bed with MODIFIED INDEPENDENCE using the least restrictive device within 7 treatment day(s). (3.) Harika Rice will ambulate with MODIFIED INDEPENDENCE for 250 feet with the least restrictive device within 7 treatment day(s). (4.) Harika Rice will perform standing static and dynamic balance activities x 20 minutes with SUPERVISION to improve safety within 7 treatment day(s). (5.) Harika Rice will perform therapeutic exercises x 20 min for HEP with INDEPENDENCE to improve strength, endurance, and functional mobility within 7 treatment day(s). PHYSICAL THERAPY Initial Assessment, Daily Note, and AM  (Link to Caseload Tracking: PT Visit Days : 1  Acknowledge Orders  Time In/Out  PT Charge Capture  Rehab Caseload Tracker    Harika Rice is a 50 y.o. female   PRIMARY DIAGNOSIS: Coffee ground emesis  Tachycardia [R00.0]  Metastatic carcinoma (HCC) [C79.9]  Coffee ground emesis [K92.0]  Hematemesis with nausea [K92.0]  Leukocytosis, unspecified type [D72.829]  Procedure(s) (LRB):  EGD CONTROL HEMORRHAGE with apc and hemospray (N/A)  2 Days Post-Op  Reason for Referral: Generalized Muscle Weakness (M62.81)  Difficulty in walking, Not elsewhere classified (R26.2)  Inpatient: Payor: Chris Caicedo / Plan: Adan Putnam / Product Type: *No Product type* /     ASSESSMENT:     REHAB RECOMMENDATIONS:   Recommendation to date pending progress:  Setting:  Home Health Therapy    Equipment:    To Be Determined     ASSESSMENT:  Ms. Estefani Padron is a 50year old F who presents with metastatic cancer.  At baseline, pt is typically independent with mobility (short distances) and has assist for ADL's from I=Independent, Mod I=Modified Independent, S=Supervision, SBA=Standby Assistance, CGA=Contact Guard Assistance,   Min=Minimal Assistance, Mod=Moderate Assistance, Max=Maximal Assistance, Total=Total Assistance, NT=Not Tested    PLAN:   [address]: 3 times/week for duration of hospital stay or until stated goals are met, whichever comes first.    THERAPY PROGNOSIS: Good    PROBLEM LIST:   (Skilled intervention is medically necessary to address:)  Decreased ADL/Functional Activities  Decreased Activity Tolerance  Decreased Balance  Decreased Gait Ability  Decreased Strength  Decreased Transfer Abilities INTERVENTIONS PLANNED:   (Benefits and precautions of physical therapy have been discussed with the patient.)  Self Care Training  Therapeutic Activity  Therapeutic Exercise/HEP  Neuromuscular Re-education  Gait Training  Education       TREATMENT:   EVALUATION: LOW COMPLEXITY: (Untimed Charge)    TREATMENT:   Co-Treatment PT/OT necessary due to patient's decreased overall endurance/tolerance levels, as well as need for high level skilled assistance to complete functional transfers/mobility and functional tasks  Therapeutic Activity (8 Minutes): Therapeutic activity included Rolling, Supine to Sit, Scooting, Ambulation on level ground, Sitting balance , and Standing balance to improve functional Activity tolerance, Balance, Mobility, and Strength. TREATMENT GRID:  N/A    AFTER TREATMENT PRECAUTIONS: Bed, Bed/Chair Locked, Call light within reach, Needs within reach, and RN notified    INTERDISCIPLINARY COLLABORATION:  RN/ PCT and OT/ DURAN    EDUCATION: Education Given To: Patient  Education Provided: Role of Therapy;Plan of Care; Fall Prevention Strategies  Education Method: Verbal  Barriers to Learning: None  Education Outcome: Verbalized understanding    TIME IN/OUT:  Time In: 1109  Time Out: 1800 [address]  Minutes: [address] [facility] ROSSY SOLORIO

## 2023-08-31 NOTE — CARE COORDINATION
LOS 4d  Chart reviewed by [facility] and discussion with palliative care. Patient would like some information on Hospice services. Pamphlets taken to patient to review also Palliative Care for in home services as well. Discharge plan currently is home with [facility] and Home TPN with [facility]. Patient currently does not have a Hospice order but is just requesting information. Discharge plan ongoing, no further concerns as of present. Please consult  if any new issues arise.

## 2023-08-31 NOTE — PROGRESS NOTES
Hourly rounding completed during shift. All needs met at this time. Medicated per [facility] for pain and nausea, both effective. Bed L/L with call bell in reach. Report given to oncoming RN.

## 2023-08-31 NOTE — PROGRESS NOTES
Palliative Care Progress Note    Patient: Harika Rice MRN: [id number]  SSN: [id number]    YOB: 1975  Age: 50 y.o. Sex: female       Assessment/Plan:     Chief Complaint/Interval History:   Pain tolerable with IV Dilaudid  Tired of N/V  Inquiring about hospice       Principal Diagnosis:    Pain, abdomen  R10.9    Additional Diagnoses:   Anorexia  R63.0  Constipation, Unspecified  K59.00  Fatigue, Lethargy  R53.83  Nausea/Vomiting  R11.2  Counseling, Encounter for Medical Advice  Z71.9  Encounter for Palliative Care  Z51.5    Palliative Performance Scale (PPS)   50    Medical Decision Making:   Reviewed and summarized notes over last 24 hours   Discussed case with appropriate providers  Reviewed laboratory and x-ray data over last 24 hours     Pt resting in bed, no distress noted. No visitors at bedside. She was able to have a BM. Pt now considering hospice, however, she has not discussed this with her family, namely her daughter. She is asking for resources about hospice. We discussed hospice philosophy, focusing on comfort. This would eventually include stopping TPN for nutrition. Because of patient's obstructive stomach mass, this is her only means of nutrition. Ms. Estefani Padron states she is tired of vomiting as her reason for considering hospice care. She continues to use IV Zofran and Reglan for N/V control. Her pain is controlled with IV Dilaudid every 2-3 hours. Again discussed attempting to use oral oxycodone concentrate SL for pain relief. It appears she has had less vomiting of secretions after starting scopolamine patch. Given her progression of disease and limited treatment options, we discussed that hospice is an appropriate choice for her. I encouraged her to discuss this with her family. Case management notified and will provide with pamphlets. Will wait to hear from patient prior to placing order for hospice consult.      In regards to her pain, we will increase her fentanyl

## 2023-08-31 NOTE — PROGRESS NOTES
Comprehensive Nutrition Assessment    Type and Reason for Visit: Reassess  Parenteral Nutrition Management (Hospitalists)  Oncology now primary    Nutrition Recommendations/Plan:   Parenteral Nutrition:  Central parenteral nutrition    Central line infusion  Continue: Dex 15%, 5% AA 1.56 L/day  12 hour cyclic infusion Infuse at 80ml 8134-3223, infuse at 140 ml 8495-2912, infuse at 80 ml/hr 2167-3434  Continue 250 ml 20% lipids daily  To provide: 1608 kcal/d (100% of needs), 78 grams of protein/d (100% of needs), 234 grams of CHO/d and 1810 ml of total volume/d. Above regimen: Intended to meet macronutrient goals  Lytes/L:   Sodium ( 10 meq NaCl), Potassium ( 20 meq KCl) Phosphorus ( 12.5 mmol KPO4), Calcium (4.5 meq), Magnesium 8 meq   Decreasing Na tonight. Other additives:   MVI Monday Wednesday Friday due to national shortages, MTE  Nutrition Related Medication Management: Thiamine Not indicated  Folic Acid Not indicated  Electrolyte Replacement Protocol PRN Continue for Potassium, Phosphorus, and Magnesium    Labs:   BMP daily  Mg MWF  Phos MWF    Triglyceride weekly on Tuesdays  POC Glucoses/SSI Not indicated     Malnutrition Assessment:  Malnutrition Status: At risk for malnutrition (Comment) (PN dependent at baseline, hypermetabolic state)     Nutrition Assessment:  Nutrition History: Pt last d/c from this facility 8/22 on more concentrated lower total volume formulation and resumed home cyclic TPN via PORT (usually infuses 2100 to 0900) last infused at home on 8/26. On 8/7 encounter PN with pt indicated \"TPN bag in pts room , dex 220g, protein 85g, lipids 20% 50g. \"   PN dependent since October of 2022. Weight hx per inpt RD notes and outpt Onc visits as follows: 113# bed scale wt Oct at PN start, additional wt hx per Oncology visits: 112# 1/12/23, 117# march RD encounter outpt, 121# 4/13/23, 126# 5/4/23, 128# 6/1/23, 125# 4/12/23, 131# 7/13/23, 142# 7/26 and 7/27/23, 138# 8/3/23, 151# 8/10/23. carcinomatosis)  Nutrition Interventions:   Food and/or Nutrient Delivery: Modify Parenteral Nutrition     Coordination of Nutrition Care: Continue to monitor while inpatient      Goals:   Previous Goal Met: Goal(s) Achieved  Active Goal:  (Maintain TPN for primary needs as appropriate with goals of care)       Nutrition Monitoring and Evaluation:      Food/Nutrient Intake Outcomes: Parenteral Nutrition Intake/Tolerance  Physical Signs/Symptoms Outcomes: Biochemical Data, GI Status, Fluid Status or Edema, Weight    Discharge Planning:    Parenteral Nutrition    [address] [facility]

## 2023-08-31 NOTE — PROGRESS NOTES
ACUTE OCCUPATIONAL THERAPY GOALS:   (Developed with and agreed upon by patient and/or caregiver.)  Pt is below her baseline, but does not wish to pursue skilled OT at this time. Reports family will help her. Will honor her wishes and DC her from our services. OCCUPATIONAL THERAPY Initial Assessment, Daily Note, Discharge, and AM       OT Visit Days: 1  Acknowledge Orders  Time  OT Charge Capture  Rehab Caseload Tracker      Napoleon Alejandre is a 50 y.o. female   PRIMARY DIAGNOSIS: Coffee ground emesis  Tachycardia [R00.0]  Metastatic carcinoma (HCC) [C79.9]  Coffee ground emesis [K92.0]  Hematemesis with nausea [K92.0]  Leukocytosis, unspecified type [D72.829]  Procedure(s) (LRB):  EGD CONTROL HEMORRHAGE with apc and hemospray (N/A)  2 Days Post-Op  Reason for Referral: Generalized Muscle Weakness (M62.81)  Other lack of cordination (R27.8)  Difficulty in walking, Not elsewhere classified (R26.2)  Other abnormalities of gait and mobility (R26.89)  Inpatient: Payor: Flora Ritchie / Plan: [place] / Product Type: *No Product type* /     ASSESSMENT:     REHAB RECOMMENDATIONS:   Recommendation to date pending progress:  Setting:  No further skilled occupational therapy after discharge from hospital    Equipment:    To Be Determined     ASSESSMENT:  Ms. Britany Cavazos is a 51 YO right hand dominant AAF admitted with pain, nausea and vomiting and diagnosed with above. PMH includes GERD, Metastatic cancer, Multiple admissions for same. At baseline, pt lives in a 1 level home with her boyfriend and daughter with 1 steps no HRs at entrance. Pt has been requiring more help at MODERATE ASSIST with all ADLs, MAXIMAL ASSIST with IADLs, and completes ambulation with SPC when out in community or uses a WC as DME, has not been driving lately, and pt reports no falls. Pt reports she has a walk in shower with [facility],  nozzle and shower chair and her boyfriend helps when she needs it.  Today, pt supine in bed, eager to get a bath necessary due to patient's decreased overall endurance/tolerance levels, as well as need for high level skilled assistance to complete functional transfers/mobility and functional tasks  Self Care (10 minutes): Patient participated in toileting, lower body dressing, self feeding, and grooming ADLs in supported sitting, unsupported sitting, and standing with minimal verbal and tactile cueing to increase independence, decrease assistance required, increase activity tolerance, and increase safety awareness. Patient also participated in functional mobility, functional transfer, energy conservation, and adaptive equipment training to increase independence, decrease assistance required, increase activity tolerance, increase safety awareness, and maintain precautions. TREATMENT GRID:  N/A    AFTER TREATMENT PRECAUTIONS: Bed, Bed/Chair Locked, Call light within reach, Needs within reach, RN notified, and Side rails x2    INTERDISCIPLINARY COLLABORATION:  RN/ PCT, PT/ PTA, OT/ DURAN, and RN Case Manager/      EDUCATION:  Education Given To: Patient; Other (Comment)  Education Provided: Role of Therapy;Home Exercise Program;Plan of Care;Precautions; ADL Adaptive Strategies;Transfer Training;Energy Conservation;IADL Safety;Orientation;Equipment; Fall Prevention Strategies  Education Provided Comments: importance of being up and OOB  Education Method: Demonstration;Verbal  Barriers to Learning: Other (Comment) (failure to thrive)  Education Outcome: Verbalized understanding;Continued education needed    TOTAL TREATMENT DURATION AND TIME:  Time In: 1108  Time Out: 1122  Minutes: 14    MARILU PERALTA, OT    Marilu Peralta, MS, OTR/L

## 2023-09-01 LAB
ALBUMIN SERPL-MCNC: 1.8 G/DL (ref 3.5–5)
ALBUMIN/GLOB SERPL: 0.5 (ref 0.4–1.6)
ALP SERPL-CCNC: 175 U/L (ref 50–136)
ALT SERPL-CCNC: 30 U/L (ref 12–65)
ANION GAP SERPL CALC-SCNC: 4 MMOL/L (ref 2–11)
AST SERPL-CCNC: 21 U/L (ref 15–37)
BASOPHILS # BLD: 0 K/UL (ref 0–0.2)
BASOPHILS NFR BLD: 0 % (ref 0–2)
BILIRUB SERPL-MCNC: 0.4 MG/DL (ref 0.2–1.1)
BUN SERPL-MCNC: 24 MG/DL (ref 6–23)
CALCIUM SERPL-MCNC: 8.1 MG/DL (ref 8.3–10.4)
CHLORIDE SERPL-SCNC: 116 MMOL/L (ref 101–110)
CO2 SERPL-SCNC: 24 MMOL/L (ref 21–32)
CREAT SERPL-MCNC: 0.5 MG/DL (ref 0.6–1)
DIFFERENTIAL METHOD BLD: ABNORMAL
EOSINOPHIL # BLD: 0.1 K/UL (ref 0–0.8)
EOSINOPHIL NFR BLD: 1 % (ref 0.5–7.8)
ERYTHROCYTE [DISTWIDTH] IN BLOOD BY AUTOMATED COUNT: 18.6 % (ref 11.9–14.6)
GLOBULIN SER CALC-MCNC: 3.5 G/DL (ref 2.8–4.5)
GLUCOSE SERPL-MCNC: 183 MG/DL (ref 65–100)
HCT VFR BLD AUTO: 30.2 % (ref 35.8–46.3)
HGB BLD-MCNC: 9.3 G/DL (ref 11.7–15.4)
IMM GRANULOCYTES # BLD AUTO: 0.1 K/UL (ref 0–0.5)
IMM GRANULOCYTES NFR BLD AUTO: 2 % (ref 0–5)
LYMPHOCYTES # BLD: 1.7 K/UL (ref 0.5–4.6)
LYMPHOCYTES NFR BLD: 26 % (ref 13–44)
MAGNESIUM SERPL-MCNC: 2 MG/DL (ref 1.8–2.4)
MCH RBC QN AUTO: 28.4 PG (ref 26.1–32.9)
MCHC RBC AUTO-ENTMCNC: 30.8 G/DL (ref 31.4–35)
MCV RBC AUTO: 92.1 FL (ref 82–102)
MONOCYTES # BLD: 0.8 K/UL (ref 0.1–1.3)
MONOCYTES NFR BLD: 13 % (ref 4–12)
NEUTS SEG # BLD: 3.8 K/UL (ref 1.7–8.2)
NEUTS SEG NFR BLD: 58 % (ref 43–78)
NRBC # BLD: 0.37 K/UL (ref 0–0.2)
PHOSPHATE SERPL-MCNC: 3.1 MG/DL (ref 2.5–4.5)
PLATELET # BLD AUTO: 170 K/UL (ref 150–450)
PLATELET COMMENT: ADEQUATE
PMV BLD AUTO: 11.1 FL (ref 9.4–12.3)
POTASSIUM SERPL-SCNC: 3.6 MMOL/L (ref 3.5–5.1)
PROT SERPL-MCNC: 5.3 G/DL (ref 6.3–8.2)
RBC # BLD AUTO: 3.28 M/UL (ref 4.05–5.2)
RBC MORPH BLD: ABNORMAL
RBC MORPH BLD: ABNORMAL
SODIUM SERPL-SCNC: 144 MMOL/L (ref 133–143)
WBC # BLD AUTO: 6.5 K/UL (ref 4.3–11.1)
WBC MORPH BLD: ABNORMAL

## 2023-09-01 PROCEDURE — 84100 ASSAY OF PHOSPHORUS: CPT

## 2023-09-01 PROCEDURE — 97110 THERAPEUTIC EXERCISES: CPT

## 2023-09-01 PROCEDURE — 6370000000 HC RX 637 (ALT 250 FOR IP): Performed by: PHYSICIAN ASSISTANT

## 2023-09-01 PROCEDURE — C9113 INJ PANTOPRAZOLE SODIUM, VIA: HCPCS | Performed by: FAMILY MEDICINE

## 2023-09-01 PROCEDURE — 2500000003 HC RX 250 WO HCPCS: Performed by: INTERNAL MEDICINE

## 2023-09-01 PROCEDURE — 36591 DRAW BLOOD OFF VENOUS DEVICE: CPT

## 2023-09-01 PROCEDURE — 83735 ASSAY OF MAGNESIUM: CPT

## 2023-09-01 PROCEDURE — 1100000000 HC RM PRIVATE

## 2023-09-01 PROCEDURE — 6360000002 HC RX W HCPCS: Performed by: NURSE PRACTITIONER

## 2023-09-01 PROCEDURE — 1100000003 HC PRIVATE W/ TELEMETRY

## 2023-09-01 PROCEDURE — 85025 COMPLETE CBC W/AUTO DIFF WBC: CPT

## 2023-09-01 PROCEDURE — 6360000002 HC RX W HCPCS: Performed by: INTERNAL MEDICINE

## 2023-09-01 PROCEDURE — 2580000003 HC RX 258: Performed by: FAMILY MEDICINE

## 2023-09-01 PROCEDURE — A4216 STERILE WATER/SALINE, 10 ML: HCPCS | Performed by: FAMILY MEDICINE

## 2023-09-01 PROCEDURE — 99232 SBSQ HOSP IP/OBS MODERATE 35: CPT | Performed by: PHYSICIAN ASSISTANT

## 2023-09-01 PROCEDURE — 80053 COMPREHEN METABOLIC PANEL: CPT

## 2023-09-01 PROCEDURE — 6370000000 HC RX 637 (ALT 250 FOR IP): Performed by: NURSE PRACTITIONER

## 2023-09-01 PROCEDURE — 2580000003 HC RX 258: Performed by: INTERNAL MEDICINE

## 2023-09-01 PROCEDURE — 99233 SBSQ HOSP IP/OBS HIGH 50: CPT | Performed by: INTERNAL MEDICINE

## 2023-09-01 PROCEDURE — 97530 THERAPEUTIC ACTIVITIES: CPT

## 2023-09-01 PROCEDURE — 2500000003 HC RX 250 WO HCPCS: Performed by: NURSE PRACTITIONER

## 2023-09-01 PROCEDURE — 6360000002 HC RX W HCPCS: Performed by: FAMILY MEDICINE

## 2023-09-01 PROCEDURE — 2500000003 HC RX 250 WO HCPCS: Performed by: PHYSICIAN ASSISTANT

## 2023-09-01 RX ORDER — HYDROMORPHONE HYDROCHLORIDE 1 MG/ML
1 INJECTION, SOLUTION INTRAMUSCULAR; INTRAVENOUS; SUBCUTANEOUS EVERY 4 HOURS PRN
Status: DISCONTINUED | OUTPATIENT
Start: 2023-09-01 | End: 2023-09-04 | Stop reason: HOSPADM

## 2023-09-01 RX ADMIN — OXYCODONE HYDROCHLORIDE 15 MG: 5 SOLUTION ORAL at 11:57

## 2023-09-01 RX ADMIN — HYDROMORPHONE HYDROCHLORIDE 1 MG: 1 INJECTION, SOLUTION INTRAMUSCULAR; INTRAVENOUS; SUBCUTANEOUS at 21:28

## 2023-09-01 RX ADMIN — CEFEPIME 2000 MG: 2 INJECTION, POWDER, FOR SOLUTION INTRAVENOUS at 06:23

## 2023-09-01 RX ADMIN — SODIUM CHLORIDE, PRESERVATIVE FREE 40 MG: 5 INJECTION INTRAVENOUS at 17:32

## 2023-09-01 RX ADMIN — METOCLOPRAMIDE 10 MG: 5 INJECTION, SOLUTION INTRAMUSCULAR; INTRAVENOUS at 05:13

## 2023-09-01 RX ADMIN — DOCUSATE SODIUM 50 MG AND SENNOSIDES 8.6 MG 2 TABLET: 8.6; 5 TABLET, FILM COATED ORAL at 21:19

## 2023-09-01 RX ADMIN — HYDROMORPHONE HYDROCHLORIDE 1 MG: 1 INJECTION, SOLUTION INTRAMUSCULAR; INTRAVENOUS; SUBCUTANEOUS at 01:52

## 2023-09-01 RX ADMIN — SODIUM CHLORIDE, PRESERVATIVE FREE 10 ML: 5 INJECTION INTRAVENOUS at 08:32

## 2023-09-01 RX ADMIN — METOCLOPRAMIDE 10 MG: 5 INJECTION, SOLUTION INTRAMUSCULAR; INTRAVENOUS at 08:50

## 2023-09-01 RX ADMIN — I.V. FAT EMULSION 250 ML: 20 EMULSION INTRAVENOUS at 17:51

## 2023-09-01 RX ADMIN — CEFEPIME 2000 MG: 2 INJECTION, POWDER, FOR SOLUTION INTRAVENOUS at 13:52

## 2023-09-01 RX ADMIN — METOCLOPRAMIDE 10 MG: 5 INJECTION, SOLUTION INTRAMUSCULAR; INTRAVENOUS at 12:07

## 2023-09-01 RX ADMIN — POTASSIUM CHLORIDE: 2 INJECTION, SOLUTION, CONCENTRATE INTRAVENOUS at 17:51

## 2023-09-01 RX ADMIN — SODIUM CHLORIDE, PRESERVATIVE FREE 40 MG: 5 INJECTION INTRAVENOUS at 05:13

## 2023-09-01 RX ADMIN — CEFEPIME 2000 MG: 2 INJECTION, POWDER, FOR SOLUTION INTRAVENOUS at 22:34

## 2023-09-01 RX ADMIN — HYDROMORPHONE HYDROCHLORIDE 1 MG: 1 INJECTION, SOLUTION INTRAMUSCULAR; INTRAVENOUS; SUBCUTANEOUS at 05:13

## 2023-09-01 RX ADMIN — ONDANSETRON 4 MG: 2 INJECTION INTRAMUSCULAR; INTRAVENOUS at 07:24

## 2023-09-01 RX ADMIN — ONDANSETRON 4 MG: 2 INJECTION INTRAMUSCULAR; INTRAVENOUS at 01:52

## 2023-09-01 RX ADMIN — POLYETHYLENE GLYCOL 3350 17 G: 17 POWDER, FOR SOLUTION ORAL at 08:37

## 2023-09-01 RX ADMIN — HYDROMORPHONE HYDROCHLORIDE 1 MG: 1 INJECTION, SOLUTION INTRAMUSCULAR; INTRAVENOUS; SUBCUTANEOUS at 17:37

## 2023-09-01 RX ADMIN — SODIUM CHLORIDE, PRESERVATIVE FREE 10 ML: 5 INJECTION INTRAVENOUS at 21:19

## 2023-09-01 RX ADMIN — ONDANSETRON 4 MG: 2 INJECTION INTRAMUSCULAR; INTRAVENOUS at 21:28

## 2023-09-01 RX ADMIN — HYDROMORPHONE HYDROCHLORIDE 1 MG: 1 INJECTION, SOLUTION INTRAMUSCULAR; INTRAVENOUS; SUBCUTANEOUS at 07:24

## 2023-09-01 ASSESSMENT — PAIN DESCRIPTION - DESCRIPTORS
DESCRIPTORS: ACHING;DISCOMFORT;TIGHTNESS
DESCRIPTORS: DISCOMFORT
DESCRIPTORS: ACHING
DESCRIPTORS: ACHING
DESCRIPTORS: ACHING;DISCOMFORT;TIGHTNESS

## 2023-09-01 ASSESSMENT — PAIN SCALES - GENERAL
PAINLEVEL_OUTOF10: 4
PAINLEVEL_OUTOF10: 8
PAINLEVEL_OUTOF10: 10
PAINLEVEL_OUTOF10: 0
PAINLEVEL_OUTOF10: 3
PAINLEVEL_OUTOF10: 8
PAINLEVEL_OUTOF10: 7
PAINLEVEL_OUTOF10: 5
PAINLEVEL_OUTOF10: 8
PAINLEVEL_OUTOF10: 3

## 2023-09-01 ASSESSMENT — PAIN DESCRIPTION - ONSET
ONSET: GRADUAL
ONSET: ON-GOING
ONSET: GRADUAL
ONSET: ON-GOING

## 2023-09-01 ASSESSMENT — PAIN - FUNCTIONAL ASSESSMENT
PAIN_FUNCTIONAL_ASSESSMENT: PREVENTS OR INTERFERES SOME ACTIVE ACTIVITIES AND ADLS
PAIN_FUNCTIONAL_ASSESSMENT: ACTIVITIES ARE NOT PREVENTED
PAIN_FUNCTIONAL_ASSESSMENT: ACTIVITIES ARE NOT PREVENTED
PAIN_FUNCTIONAL_ASSESSMENT: PREVENTS OR INTERFERES SOME ACTIVE ACTIVITIES AND ADLS

## 2023-09-01 ASSESSMENT — PAIN DESCRIPTION - PAIN TYPE
TYPE: CHRONIC PAIN
TYPE: DEEP SOMATIC PAIN
TYPE: CHRONIC PAIN
TYPE: DEEP SOMATIC PAIN

## 2023-09-01 ASSESSMENT — PAIN DESCRIPTION - LOCATION
LOCATION: ABDOMEN

## 2023-09-01 ASSESSMENT — PAIN DESCRIPTION - FREQUENCY
FREQUENCY: CONTINUOUS

## 2023-09-01 ASSESSMENT — PAIN DESCRIPTION - ORIENTATION
ORIENTATION: MID;ANTERIOR
ORIENTATION: ANTERIOR

## 2023-09-01 NOTE — PROGRESS NOTES
Comprehensive Nutrition Assessment    Type and Reason for Visit: Reassess  Parenteral Nutrition Management (Hospitalists)  Oncology now primary    Nutrition Recommendations/Plan:   Parenteral Nutrition:  Central parenteral nutrition    Central line infusion  Continue: Dex 15%, 5% AA 1.56 L/day  12 hour cyclic infusion Infuse at 80ml 3278-1204, infuse at 140 ml 8596-6199, infuse at 80 ml/hr 1874-4304  Continue 250 ml 20% lipids daily  To provide: 1608 kcal/d (100% of needs), 78 grams of protein/d (100% of needs), 234 grams of CHO/d and 1810 ml of total volume/d. Above regimen: Intended to meet macronutrient goals  Lytes/L:   Sodium ( 10 meq NaCl), Potassium ( 30 meq KCl) Phosphorus ( 7.5 mmol KPO4), Calcium (4.5 meq), Magnesium 8 meq   Changes will increase K and decrease Phos   Other additives:   MVI Monday Wednesday Friday due to [employer], MTE  Nutrition Related Medication Management: Thiamine Not indicated  Folic Acid Not indicated  Electrolyte Replacement Protocol PRN Continue for Potassium, Phosphorus, and Magnesium    Labs:   BMP daily  Mg MWF  Phos MWF    Triglyceride weekly on Tuesdays  POC Glucoses/SSI Not indicated     Malnutrition Assessment:  Malnutrition Status: At risk for malnutrition (Comment) (PN dependent at baseline, hypermetabolic state)     Nutrition Assessment:  Nutrition History: Pt last d/c from this facility 8/22 on more concentrated lower total volume formulation and resumed home cyclic TPN via PORT (usually infuses 2100 to 0900) last infused at home on 8/26. On 8/7 encounter PN with pt indicated \"TPN bag in pts room , dex 220g, protein 85g, lipids 20% 50g. \"   PN dependent since October of 2022.   Weight hx per inpt RD notes and outpt Onc visits as follows: 113# bed scale wt Oct at PN start, additional wt hx per Oncology visits: 112# 1/12/23, 117# march RD encounter outpt, 121# 4/13/23, 126# 5/4/23, 128# 6/1/23, 125# 4/12/23, 131# 7/13/23, 142# 7/26 and 7/27/23, 138# 8/3/23,

## 2023-09-01 NOTE — PROGRESS NOTES
's visit to convey care and concern and to offer spiritual/emotional support. Received prayer request for strength and healing. Offered prayer for Ms. Shin Aguirre, preferred name Tiffanie Martin, and her soon-to be- Mariano present. Also, Ms. Shin Aguirre has a 15 y/o daughter.      [address], [address] [facility]  Board Certified

## 2023-09-01 NOTE — PROGRESS NOTES
[facility] Hematology & Oncology        Inpatient Hematology / Oncology Progress Note    Reason for Admission:  Tachycardia [R00.0]  Metastatic carcinoma ([address]) [C79.9]  Coffee ground emesis [K92.0]  Hematemesis with nausea [K92.0]  Leukocytosis, unspecified type [D72.829]    24 Hour Events:  Afebrile, VSS - tachycardic at times  Hgb stable - 9.3, no further hematemesis  N/V better, diet advanced to FLD yesterday  Ongoing pain utilizing IV dilaudid but overall improved on increased Fent patch  Considering hospice but not ready to make a decision at this time    Transfusions: None  Replacements: None    ROS:  Constitutional: Positive for malaise and fatigue; negative for fever, chills, weakness. CV: Negative for chest pain, palpitations, edema. Respiratory: Negative for dyspnea, cough, wheezing. GI: Positive for nausea and abdominal pain; negative for diarrhea. 10 point review of systems is otherwise negative with the exception of the elements mentioned above in the HPI.        No Known Allergies  Past Medical History:   Diagnosis Date    Abdominal pain 10/3/2022    Alteration in nutrition     per RD note TPN dependent 12 hours per day- Intramed Plus    Anemia     Chemotherapy induced nausea and vomiting 10/14/2022    Colon cancer ([address]) dx 2/2022    followed by dr Alyx Ramsey    COVID-19 12/2020    no hospitalization    GERD (gastroesophageal reflux disease)     managed with med    History of blood transfusion 2022    History of colonoscopy 05/2018    Dr. Nazanin Brumfield, nl (see media note), R 2028    Hx of blood clots 06/2022    per pt \"small clot on lung identified by CT scan\"  CT Scan impression:- Nonobstructive pulmonary filling defect involving Left Lower Lobe     Hypotension     asymptomatic    Intractable pain 7/24/2023    Obstruction of fallopian tube     per pt has \"1 good tube\"    Peritoneal carcinomatosis ([address])     chemo; abdominal pleurx    Weight loss     80lbs weight loss after gastric sleeve     Past Comprehensive Metabolic Panel    Collection Time: 09/01/23  2:04 AM   Result Value Ref Range    Sodium 144 (H) 133 - 143 mmol/L    Potassium 3.6 3.5 - 5.1 mmol/L    Chloride 116 (H) 101 - 110 mmol/L    CO2 24 21 - 32 mmol/L    Anion Gap 4 2 - 11 mmol/L    Glucose 183 (H) 65 - 100 mg/dL    BUN 24 (H) 6 - 23 MG/DL    Creatinine 0.50 (L) 0.6 - 1.0 MG/DL    Est, Glom Filt Rate >60 >60 ml/min/1.73m2    Calcium 8.1 (L) 8.3 - 10.4 MG/DL    Total Bilirubin 0.4 0.2 - 1.1 MG/DL    ALT 30 12 - 65 U/L    AST 21 15 - 37 U/L    Alk Phosphatase 175 (H) 50 - 136 U/L    Total Protein 5.3 (L) 6.3 - 8.2 g/dL    Albumin 1.8 (L) 3.5 - 5.0 g/dL    Globulin 3.5 2.8 - 4.5 g/dL    Albumin/Globulin Ratio 0.5 0.4 - 1.6         Imaging:  CT Result (most recent):  CT ABDOMEN PELVIS W IV CONTRAST 08/27/2023    Narrative  EXAMINATION: CT SCAN OF THE ABDOMEN AND PELVIS WITH INTRAVENOUS CONTRAST    DATE OF EXAM: 8/27/2023 6:45 PM    HISTORY: Abdominal pain, GI bleeding, History of undifferentiated carcinoma    COMPARISON: 8/14/2023. TECHNIQUE: CT examination of the abdomen and pelvis was performed following the  intravenous administration of 100 mL  Isovue-370. CT dose lowering techniques  were used, to include: automated exposure control, adjustment for patient size,  and/or use of iterative reconstruction. FINDINGS:    ABDOMEN/PELVIS:    Lower Chest: Small pleural effusions, right greater than left. Subjacent  atelectasis. Liver: Normal.    Gallbladder/Biliary: Normal.    Pancreas: Normal.    Spleen: Normal.    Adrenal Glands: Normal.    Kidneys: Moderate right hydroureteronephrosis is similar to the comparison exam.  Slightly increased mild left hydronephrosis. GI Tract: No bowel obstruction. Moderate to large stool burden. Normal appendix. Postsurgical changes of the stomach. Fluid in the distal esophagus with mild  circumferential thickening.     Mesentery/Peritoneum: Redemonstrated large multiloculated fluid

## 2023-09-01 NOTE — PROGRESS NOTES
ACUTE PHYSICAL THERAPY GOALS:   (Developed with and agreed upon by patient and/or caregiver.)     (1.) Gildardo Barnett  will move from supine to sit and sit to supine  with INDEPENDENCE within 7 treatment day(s). (2.) Gildardo Barnett will transfer from bed to chair and chair to bed with MODIFIED INDEPENDENCE using the least restrictive device within 7 treatment day(s). (3.) Gildardo Barnett will ambulate with MODIFIED INDEPENDENCE for 250 feet with the least restrictive device within 7 treatment day(s). (4.) Gildardo Barnett will perform standing static and dynamic balance activities x 20 minutes with SUPERVISION to improve safety within 7 treatment day(s). (5.) Gildardo Barnett will perform therapeutic exercises x 20 min for HEP with INDEPENDENCE to improve strength, endurance, and functional mobility within 7 treatment day(s). PHYSICAL THERAPY: Daily Note AM   (Link to Caseload Tracking: PT Visit Days : 2  Time In/Out PT Charge Capture  Rehab Caseload Tracker  Orders    Gildardo Barnett is a 50 y.o. female   PRIMARY DIAGNOSIS: Coffee ground emesis  Tachycardia [R00.0]  Metastatic carcinoma (HCC) [C79.9]  Coffee ground emesis [K92.0]  Hematemesis with nausea [K92.0]  Leukocytosis, unspecified type [D72.829]  Procedure(s) (LRB):  EGD CONTROL HEMORRHAGE with apc and hemospray (N/A)  3 Days Post-Op  Inpatient: Payor: Maddy Carrier / Plan: Adan Putnam / Product Type: *No Product type* /     ASSESSMENT:     REHAB RECOMMENDATIONS:   Recommendation to date pending progress:  Setting:  Home Health Therapy    Equipment:    To Be Determined     ASSESSMENT:  Ms. Christian Hernandez was sitting up in chair on arrival and agreeable to PT. She ambulated 350' with RW and CGA. BLE exercises performed while sitting up in chair. Pt left with needs in reach.      SUBJECTIVE:   Ms. Christian Hernandez states, \"Let's walk around\"     Social/Functional Lives With: Significant other, Family  Type of Home: [address] [place]    Weightbearing Status      Stairs      I=Independent, Mod I=Modified Independent, S=Supervision, SBA=Standby Assistance, CGA=Contact Guard Assistance,   Min=Minimal Assistance, Mod=Moderate Assistance, Max=Maximal Assistance, Total=Total Assistance, NT=Not Tested    PLAN:   FREQUENCY AND DURATION: 3 times/week for duration of hospital stay or until stated goals are met, whichever comes first.    TREATMENT:   TREATMENT:   Therapeutic Activity (13 Minutes): Therapeutic activity included Ambulation on level ground, Sitting balance , and Standing balance to improve functional Activity tolerance, Balance, and Mobility. Therapeutic Exercise (10 Minutes): Therapeutic exercises noted below to improve functional activity tolerance, strength, and mobility.      TREATMENT GRID:   Date:  9/1/23 Date:   Date:     Activity/Exercise Parameters Parameters Parameters   Heel/toe taps 15 B     LAQs 15 B     marching 15 B     Hip ABD/ADD 15 B                         AFTER TREATMENT PRECAUTIONS: Bed/Chair Locked, Call light within reach, Chair, and Needs within reach    INTERDISCIPLINARY COLLABORATION:  PT/ PTA    EDUCATION:      TIME IN/OUT:  Time In: 1106  Time Out: 1027 [facility]  Minutes: Lexx Curiel PTA

## 2023-09-02 LAB
ALBUMIN SERPL-MCNC: 1.6 G/DL (ref 3.5–5)
ALBUMIN/GLOB SERPL: 0.5 (ref 0.4–1.6)
ALP SERPL-CCNC: 157 U/L (ref 50–136)
ALT SERPL-CCNC: 26 U/L (ref 12–65)
ANION GAP SERPL CALC-SCNC: 6 MMOL/L (ref 2–11)
AST SERPL-CCNC: 19 U/L (ref 15–37)
BACTERIA SPEC CULT: NORMAL
BACTERIA SPEC CULT: NORMAL
BASOPHILS # BLD: 0 K/UL (ref 0–0.2)
BASOPHILS NFR BLD: 0 % (ref 0–2)
BILIRUB SERPL-MCNC: 0.3 MG/DL (ref 0.2–1.1)
BUN SERPL-MCNC: 21 MG/DL (ref 6–23)
CALCIUM SERPL-MCNC: 7.8 MG/DL (ref 8.3–10.4)
CHLORIDE SERPL-SCNC: 114 MMOL/L (ref 101–110)
CO2 SERPL-SCNC: 25 MMOL/L (ref 21–32)
CREAT SERPL-MCNC: 0.4 MG/DL (ref 0.6–1)
DIFFERENTIAL METHOD BLD: ABNORMAL
EOSINOPHIL # BLD: 0 K/UL (ref 0–0.8)
EOSINOPHIL NFR BLD: 1 % (ref 0.5–7.8)
ERYTHROCYTE [DISTWIDTH] IN BLOOD BY AUTOMATED COUNT: 18.3 % (ref 11.9–14.6)
GLOBULIN SER CALC-MCNC: 3.3 G/DL (ref 2.8–4.5)
GLUCOSE SERPL-MCNC: 173 MG/DL (ref 65–100)
HCT VFR BLD AUTO: 27.1 % (ref 35.8–46.3)
HGB BLD-MCNC: 8.3 G/DL (ref 11.7–15.4)
IMM GRANULOCYTES # BLD AUTO: 0.1 K/UL (ref 0–0.5)
IMM GRANULOCYTES NFR BLD AUTO: 2 % (ref 0–5)
LYMPHOCYTES # BLD: 1.2 K/UL (ref 0.5–4.6)
LYMPHOCYTES NFR BLD: 19 % (ref 13–44)
MAGNESIUM SERPL-MCNC: 1.7 MG/DL (ref 1.8–2.4)
MCH RBC QN AUTO: 28.4 PG (ref 26.1–32.9)
MCHC RBC AUTO-ENTMCNC: 30.6 G/DL (ref 31.4–35)
MCV RBC AUTO: 92.8 FL (ref 82–102)
MONOCYTES # BLD: 1 K/UL (ref 0.1–1.3)
MONOCYTES NFR BLD: 15 % (ref 4–12)
NEUTS SEG # BLD: 3.9 K/UL (ref 1.7–8.2)
NEUTS SEG NFR BLD: 63 % (ref 43–78)
NRBC # BLD: 0.17 K/UL (ref 0–0.2)
PLATELET # BLD AUTO: 161 K/UL (ref 150–450)
PMV BLD AUTO: 11.4 FL (ref 9.4–12.3)
POTASSIUM SERPL-SCNC: 3.6 MMOL/L (ref 3.5–5.1)
PROT SERPL-MCNC: 4.9 G/DL (ref 6.3–8.2)
RBC # BLD AUTO: 2.92 M/UL (ref 4.05–5.2)
SERVICE CMNT-IMP: NORMAL
SERVICE CMNT-IMP: NORMAL
SODIUM SERPL-SCNC: 145 MMOL/L (ref 133–143)
WBC # BLD AUTO: 6.2 K/UL (ref 4.3–11.1)

## 2023-09-02 PROCEDURE — A4216 STERILE WATER/SALINE, 10 ML: HCPCS | Performed by: FAMILY MEDICINE

## 2023-09-02 PROCEDURE — 2580000003 HC RX 258: Performed by: NURSE PRACTITIONER

## 2023-09-02 PROCEDURE — 1100000000 HC RM PRIVATE

## 2023-09-02 PROCEDURE — 6360000002 HC RX W HCPCS: Performed by: FAMILY MEDICINE

## 2023-09-02 PROCEDURE — 83735 ASSAY OF MAGNESIUM: CPT

## 2023-09-02 PROCEDURE — 6360000002 HC RX W HCPCS: Performed by: NURSE PRACTITIONER

## 2023-09-02 PROCEDURE — 2580000003 HC RX 258: Performed by: PHYSICIAN ASSISTANT

## 2023-09-02 PROCEDURE — 80053 COMPREHEN METABOLIC PANEL: CPT

## 2023-09-02 PROCEDURE — C9113 INJ PANTOPRAZOLE SODIUM, VIA: HCPCS | Performed by: FAMILY MEDICINE

## 2023-09-02 PROCEDURE — 85025 COMPLETE CBC W/AUTO DIFF WBC: CPT

## 2023-09-02 PROCEDURE — 6360000002 HC RX W HCPCS: Performed by: INTERNAL MEDICINE

## 2023-09-02 PROCEDURE — 2500000003 HC RX 250 WO HCPCS: Performed by: INTERNAL MEDICINE

## 2023-09-02 PROCEDURE — 2580000003 HC RX 258: Performed by: FAMILY MEDICINE

## 2023-09-02 PROCEDURE — 99233 SBSQ HOSP IP/OBS HIGH 50: CPT | Performed by: INTERNAL MEDICINE

## 2023-09-02 PROCEDURE — 6360000002 HC RX W HCPCS: Performed by: PHYSICIAN ASSISTANT

## 2023-09-02 PROCEDURE — 36591 DRAW BLOOD OFF VENOUS DEVICE: CPT

## 2023-09-02 PROCEDURE — 2500000003 HC RX 250 WO HCPCS: Performed by: PHYSICIAN ASSISTANT

## 2023-09-02 PROCEDURE — 1100000003 HC PRIVATE W/ TELEMETRY

## 2023-09-02 PROCEDURE — 6370000000 HC RX 637 (ALT 250 FOR IP): Performed by: NURSE PRACTITIONER

## 2023-09-02 PROCEDURE — A4216 STERILE WATER/SALINE, 10 ML: HCPCS | Performed by: NURSE PRACTITIONER

## 2023-09-02 RX ORDER — MAGNESIUM SULFATE HEPTAHYDRATE 40 MG/ML
2000 INJECTION, SOLUTION INTRAVENOUS ONCE
Status: DISCONTINUED | OUTPATIENT
Start: 2023-09-02 | End: 2023-09-04 | Stop reason: HOSPADM

## 2023-09-02 RX ADMIN — I.V. FAT EMULSION 250 ML: 20 EMULSION INTRAVENOUS at 17:12

## 2023-09-02 RX ADMIN — CEFEPIME 2000 MG: 2 INJECTION, POWDER, FOR SOLUTION INTRAVENOUS at 14:25

## 2023-09-02 RX ADMIN — POLYETHYLENE GLYCOL 3350 17 G: 17 POWDER, FOR SOLUTION ORAL at 08:04

## 2023-09-02 RX ADMIN — SODIUM CHLORIDE, PRESERVATIVE FREE 40 MG: 5 INJECTION INTRAVENOUS at 16:32

## 2023-09-02 RX ADMIN — SODIUM CHLORIDE, PRESERVATIVE FREE 10 ML: 5 INJECTION INTRAVENOUS at 08:04

## 2023-09-02 RX ADMIN — HYDROMORPHONE HYDROCHLORIDE 1 MG: 1 INJECTION, SOLUTION INTRAMUSCULAR; INTRAVENOUS; SUBCUTANEOUS at 17:40

## 2023-09-02 RX ADMIN — ONDANSETRON 4 MG: 2 INJECTION INTRAMUSCULAR; INTRAVENOUS at 20:29

## 2023-09-02 RX ADMIN — SODIUM CHLORIDE, PRESERVATIVE FREE 40 MG: 5 INJECTION INTRAVENOUS at 06:07

## 2023-09-02 RX ADMIN — POTASSIUM PHOSPHATE, MONOBASIC POTASSIUM PHOSPHATE, DIBASIC: 224; 236 INJECTION, SOLUTION, CONCENTRATE INTRAVENOUS at 17:12

## 2023-09-02 RX ADMIN — CEFEPIME 2000 MG: 2 INJECTION, POWDER, FOR SOLUTION INTRAVENOUS at 22:30

## 2023-09-02 RX ADMIN — METOCLOPRAMIDE 10 MG: 5 INJECTION, SOLUTION INTRAMUSCULAR; INTRAVENOUS at 10:40

## 2023-09-02 RX ADMIN — DOCUSATE SODIUM 50 MG AND SENNOSIDES 8.6 MG 2 TABLET: 8.6; 5 TABLET, FILM COATED ORAL at 08:04

## 2023-09-02 RX ADMIN — SODIUM CHLORIDE 1 MG: 9 INJECTION INTRAMUSCULAR; INTRAVENOUS; SUBCUTANEOUS at 02:35

## 2023-09-02 RX ADMIN — METOCLOPRAMIDE 10 MG: 5 INJECTION, SOLUTION INTRAMUSCULAR; INTRAVENOUS at 01:00

## 2023-09-02 RX ADMIN — ONDANSETRON 4 MG: 2 INJECTION INTRAMUSCULAR; INTRAVENOUS at 06:08

## 2023-09-02 RX ADMIN — CEFEPIME 2000 MG: 2 INJECTION, POWDER, FOR SOLUTION INTRAVENOUS at 06:07

## 2023-09-02 RX ADMIN — SODIUM CHLORIDE, PRESERVATIVE FREE 10 ML: 5 INJECTION INTRAVENOUS at 20:30

## 2023-09-02 RX ADMIN — SODIUM CHLORIDE 1 MG: 9 INJECTION INTRAMUSCULAR; INTRAVENOUS; SUBCUTANEOUS at 16:32

## 2023-09-02 RX ADMIN — HYDROMORPHONE HYDROCHLORIDE 1 MG: 1 INJECTION, SOLUTION INTRAMUSCULAR; INTRAVENOUS; SUBCUTANEOUS at 12:48

## 2023-09-02 RX ADMIN — ONDANSETRON 4 MG: 2 INJECTION INTRAMUSCULAR; INTRAVENOUS at 14:27

## 2023-09-02 RX ADMIN — HYDROMORPHONE HYDROCHLORIDE 1 MG: 1 INJECTION, SOLUTION INTRAMUSCULAR; INTRAVENOUS; SUBCUTANEOUS at 08:04

## 2023-09-02 RX ADMIN — MAGNESIUM SULFATE HEPTAHYDRATE 2000 MG: 40 INJECTION, SOLUTION INTRAVENOUS at 09:42

## 2023-09-02 RX ADMIN — HYDROMORPHONE HYDROCHLORIDE 1 MG: 1 INJECTION, SOLUTION INTRAMUSCULAR; INTRAVENOUS; SUBCUTANEOUS at 22:30

## 2023-09-02 ASSESSMENT — PAIN SCALES - GENERAL
PAINLEVEL_OUTOF10: 0
PAINLEVEL_OUTOF10: 8
PAINLEVEL_OUTOF10: 0
PAINLEVEL_OUTOF10: 10
PAINLEVEL_OUTOF10: 0

## 2023-09-02 ASSESSMENT — PAIN DESCRIPTION - FREQUENCY
FREQUENCY: CONTINUOUS

## 2023-09-02 ASSESSMENT — PAIN DESCRIPTION - PAIN TYPE
TYPE: CHRONIC PAIN
TYPE: ACUTE PAIN
TYPE: ACUTE PAIN

## 2023-09-02 ASSESSMENT — PAIN DESCRIPTION - LOCATION
LOCATION: ABDOMEN

## 2023-09-02 ASSESSMENT — PAIN DESCRIPTION - ONSET
ONSET: ON-GOING
ONSET: GRADUAL
ONSET: ON-GOING

## 2023-09-02 ASSESSMENT — PAIN DESCRIPTION - DESCRIPTORS
DESCRIPTORS: PRESSURE
DESCRIPTORS: DISCOMFORT
DESCRIPTORS: SHARP;SPASM
DESCRIPTORS: ACHING

## 2023-09-02 ASSESSMENT — PAIN - FUNCTIONAL ASSESSMENT
PAIN_FUNCTIONAL_ASSESSMENT: PREVENTS OR INTERFERES SOME ACTIVE ACTIVITIES AND ADLS

## 2023-09-02 ASSESSMENT — PAIN DESCRIPTION - ORIENTATION
ORIENTATION: MID
ORIENTATION: MID
ORIENTATION: MID;UPPER

## 2023-09-02 NOTE — PROGRESS NOTES
Comprehensive Nutrition Assessment    Type and Reason for Visit: Reassess  Parenteral Nutrition Management (Hospitalists)  Oncology now primary    Nutrition Recommendations/Plan:   Parenteral Nutrition:  Central parenteral nutrition    Central line infusion  Continue: Dex 15%, 5% AA 1.56 L/day  12 hour cyclic infusion Infuse at 80ml 8001-1826, infuse at 140 ml 6180-9652, infuse at 80 ml/hr 9046-1743  Continue 250 ml 20% lipids daily  To provide: 1608 kcal/d (100% of needs), 78 grams of protein/d (100% of needs), 234 grams of CHO/d and 1810 ml of total volume/d. Above regimen: Intended to meet macronutrient goals  Lytes/L:   Sodium ( 10 meq NaCl), Potassium ( 30 meq KCl) Phosphorus ( 7.5 mmol KPO4), Calcium (4.5 meq), Magnesium 10 meq   Changes will increase K and decrease Phos   Other additives:   MVI Monday Wednesday Friday due to [employer], MTE  Nutrition Related Medication Management: Thiamine Not indicated  Folic Acid Not indicated  Electrolyte Replacement Protocol PRN Continue for Potassium, Phosphorus, and Magnesium    Labs:   BMP daily  Mg MWF  Phos MWF    Triglyceride weekly on Tuesdays  POC Glucoses/SSI Not indicated     Malnutrition Assessment:  Malnutrition Status: At risk for malnutrition (Comment) (PN dependent at baseline, hypermetabolic state)     Nutrition Assessment:  Nutrition History: Pt last d/c from this facility 8/22 on more concentrated lower total volume formulation and resumed home cyclic TPN via PORT (usually infuses 2100 to 0900) last infused at home on 8/26. On 8/7 encounter PN with pt indicated \"TPN bag in pts room , dex 220g, protein 85g, lipids 20% 50g. \"   PN dependent since October of 2022.   Weight hx per inpt RD notes and outpt Onc visits as follows: 113# bed scale wt Oct at PN start, additional wt hx per Oncology visits: 112# 1/12/23, 117# march RD encounter outpt, 121# 4/13/23, 126# 5/4/23, 128# 6/1/23, 125# 4/12/23, 131# 7/13/23, 142# 7/26 and 7/27/23, 138# 8/3/23, 151# 8/10/23. Do You Have Any Cultural, Yazidi, or Ethnic Food Preferences?: No   Nutrition Background:       PMH remarkable for GERD, metastatic carcinoma of unknown primary (most c.w GI) peritoneal carcinomatosis on TPN, malignant ascites w PeritX catheter, right pleural effusion, r sided hydronephrosis, esophagitis, chronic vomiting, gastric sleeve, ulcerated fungating mass on EGD 8/15. Reglan pump established between inpt admissions. Presented this encounter with coffee ground emesis and abdominal pain. Admitted with intractable nausea with emesis, hypernatremia, peritoneal carcinoma, leukocytosis, bilateral hydronephrosis, esophagitis. GI consulted, EGD 8/29: Findings large ulcerated obstructing stomach mass consistent with previous history of adenocarcinoma of stomach with active oozing of blood sp apc and hemospray . Admitted 8/27 overnight, PN started inpt 9/96 cyclic per home regimen. Nutrition Interval:  Palliative care following. Pt remains PN dependent. Continues with vomiting Discussed with Tianna Hung RN. RN reports pt requested milkshake this morning and then consumed about 2 sips.     Abdominal Status (last documented by nursing):   Last BM (including prior to admit): 08/31/23, GI Symptoms: Reduction in appetite, Nausea, Vomiting   Pertinent Medications: maxipime, fentanyl, protonix, glycolax (held 8/29, 8/30, 8/31), reglan, scopolamine, senokot, vanc  Continuous: none  IVF: stopped w PN start  Electrolyte Replacement:  8/30: 15 mmol Kphos, 9/2: 2 gram Mg  Pertinent administered PRN: dulcolax suppository (8/30), reglan (last admin 9/1), zofran (last admin 9/2)  Pertinent Labs:   Lab Results   Component Value Date/Time     09/02/2023 04:36 AM    K 3.6 09/02/2023 04:36 AM     09/02/2023 04:36 AM    CO2 25 09/02/2023 04:36 AM    BUN 21 09/02/2023 04:36 AM    CREATININE 0.40 09/02/2023 04:36 AM    GLUCOSE 173 09/02/2023 04:36 AM    CALCIUM 7.8 09/02/2023 04:36 AM PHOS 3.1 09/01/2023 02:04 AM    MG 1.7 09/02/2023 04:36 AM     Lab Results   Component Value Date/Time    TRIG 168 08/29/2023 05:04 AM    TRIG 128 08/28/2023 05:03 AM    TRIG 106 08/22/2023 03:38 AM     Hemoglobin A1C   Date Value Ref Range Status   07/25/2023 5.4 4.8 - 5.6 % Final   8/29 Ionized Ca WNL   Remarkable for: mildly elevated  Na (decreased in PN 8/31), glucose remains elevated (not indicative of SSI). K relatively stable,low Mg (current PN provides. 12 meq/d)      Current Nutrition Therapies:  ADULT DIET; Full Liquid  PN-Adult Premix 5/15 - Central  Current Parenteral Nutrition Orders:  Type and Formula: Premix Central (Dex 15%, 5% AA)   Lipids: 250ml, Daily  Duration: Cyclic  Rate/Volume: 7.24 L/day (Infuse at 80ml 2604-1937, infuse at 140 ml 7894-5358, infuse at 80 ml/hr 4524-4674)  Current PN Order Provides: 1608 kcal/d (100% of needs), 78 grams of protein/d (100% of needs), 234 grams of CHO/d and 1810 ml of total volume/d    Current Intake:   Average Meal Intake: 1-25% Average Supplements Intake: None Ordered      Anthropometric Measures:  Height: 5' 4\" (162.6 cm)  Current Body Wt: 150 lb 9.2 oz (68.3 kg) (9/1), Weight source: Bed Scale  BMI: 25.8, Normal Weight (BMI 18.5-24. 9)  Admission Body Weight: 142 lb (64.4 kg) (8/27 bed scale)  Ideal Body Weight (Kg) (Calculated): 55 kg (120 lbs), 119 %  Usual Body Wt: 113 lb (51.3 kg) (Oct 23, 2022 bed scale weight with PN start, 117# March RD f/u output), Percent weight change: 26.4       BMI Category Normal Weight (BMI 18.5-24. 9)  Estimated Daily Nutrient Needs:  Energy (kcal/day): 5896-6758 (25-30 kcal/kg) (Kcal/kg Weight used: 57 kg ([facility]) Other (Comment)  Protein (g/day): 68-86 (1.2-1.5 g/kg) Weight Used: (Other (Comment)) 57 kg (as above)  Fluid (ml/day):   (1 ml/kcal)    Nutrition Diagnosis:   Inadequate oral intake related to altered GI structure as evidenced by  (metastatic peritoneal carcinamotosis, PN dependent

## 2023-09-02 NOTE — PROGRESS NOTES
[facility] Hematology & Oncology        Inpatient Hematology / Oncology Progress Note    Reason for Admission:  Tachycardia [R00.0]  Metastatic carcinoma ([address]) [C79.9]  Coffee ground emesis [K92.0]  Hematemesis with nausea [K92.0]  Leukocytosis, unspecified type [D72.829]    24 Hour Events:  Afebrile, VSS - tachycardic at times  Hgb - 8.3, no further hematemesis  N/V ongoing   Ongoing pain utilizing IV dilaudid but overall improved on increased Fent patch  Considering hospice but not ready to make a decision at this time    Transfusions: None  Replacements: None    ROS:  Constitutional: Positive for malaise and fatigue; negative for fever, chills, weakness. CV: Negative for chest pain, palpitations, edema. Respiratory: Negative for dyspnea, cough, wheezing. GI: Positive for nausea and abdominal pain; negative for diarrhea. 10 point review of systems is otherwise negative with the exception of the elements mentioned above in the HPI.        No Known Allergies  Past Medical History:   Diagnosis Date    Abdominal pain 10/3/2022    Alteration in nutrition     per RD note TPN dependent 12 hours per day- Intramed Plus    Anemia     Chemotherapy induced nausea and vomiting 10/14/2022    Colon cancer ([address]) dx 2/2022    followed by dr Servando EL-19 12/2020    no hospitalization    GERD (gastroesophageal reflux disease)     managed with med    History of blood transfusion 2022    History of colonoscopy 05/2018    Dr. Sergey Haider, nl (see media note), R 2028    Hx of blood clots 06/2022    per pt \"small clot on lung identified by CT scan\"  CT Scan impression:- Nonobstructive pulmonary filling defect involving Left Lower Lobe     Hypotension     asymptomatic    Intractable pain 7/24/2023    Obstruction of fallopian tube     per pt has \"1 good tube\"    Peritoneal carcinomatosis ([address])     chemo; abdominal pleurx    Weight loss     80lbs weight loss after gastric sleeve     Past Surgical History:   Procedure Laterality 0701 - 09/02 1900  In: 300 [P.O.:300]  Out: 140     Physical Exam:  Constitutional: Well developed, well nourished female in no acute distress, sitting comfortably in the hospital bed. HEENT: Normocephalic and atraumatic. Oropharynx is clear, mucous membranes are moist.  Extraocular muscles are intact. Sclerae anicteric. Neck supple without JVD. No thyromegaly present. Skin Warm and dry. No bruising and no rash noted. No erythema. No pallor. Neuro Grossly nonfocal with no obvious sensory or motor deficits. MSK Normal range of motion in general.  No edema and no tenderness. Psych Appropriate mood and affect.     Full exam per attending MD    Labs:    Recent Results (from the past 24 hour(s))   CBC with Auto Differential    Collection Time: 09/02/23  4:36 AM   Result Value Ref Range    WBC 6.2 4.3 - 11.1 K/uL    RBC 2.92 (L) 4.05 - 5.2 M/uL    Hemoglobin 8.3 (L) 11.7 - 15.4 g/dL    Hematocrit 27.1 (L) 35.8 - 46.3 %    MCV 92.8 82 - 102 FL    MCH 28.4 26.1 - 32.9 PG    MCHC 30.6 (L) 31.4 - 35.0 g/dL    RDW 18.3 (H) 11.9 - 14.6 %    Platelets 078 567 - 261 K/uL    MPV 11.4 9.4 - 12.3 FL    nRBC 0.17 0.0 - 0.2 K/uL    Differential Type AUTOMATED      Neutrophils % 63 43 - 78 %    Lymphocytes % 19 13 - 44 %    Monocytes % 15 (H) 4.0 - 12.0 %    Eosinophils % 1 0.5 - 7.8 %    Basophils % 0 0.0 - 2.0 %    Immature Granulocytes 2 0.0 - 5.0 %    Neutrophils Absolute 3.9 1.7 - 8.2 K/UL    Lymphocytes Absolute 1.2 0.5 - 4.6 K/UL    Monocytes Absolute 1.0 0.1 - 1.3 K/UL    Eosinophils Absolute 0.0 0.0 - 0.8 K/UL    Basophils Absolute 0.0 0.0 - 0.2 K/UL    Absolute Immature Granulocyte 0.1 0.0 - 0.5 K/UL   Magnesium    Collection Time: 09/02/23  4:36 AM   Result Value Ref Range    Magnesium 1.7 (L) 1.8 - 2.4 mg/dL   Comprehensive Metabolic Panel    Collection Time: 09/02/23  4:36 AM   Result Value Ref Range    Sodium 145 (H) 133 - 143 mmol/L    Potassium 3.6 3.5 - 5.1 mmol/L    Chloride 114 (H) 101 - 110 mmol/L

## 2023-09-02 NOTE — PROGRESS NOTES
Patient vomited moderate amount at this time. Vomited 200ml of undigested food. Zofran given per MD order. Will monitor.

## 2023-09-02 NOTE — PROGRESS NOTES
0700- BSSR received from [place], [address] [place] [address]; patient stable and in no s/sx of acute nor respiratory distress. 0804- PRN SIVP Dilaudid 1mg administered d/t patient's c/o mid ABD pain. 4583- Patient noted with 200cc of clear emesis. 4208- Magnesium sulfate replacement via IV administered per MD order. 1040- PRN SIVP Reglan 10mg administered d/t patient noted with N/V. Patient noted with 140cc of brown emesis. 1043- RLQ PleurX drained with 350cc of bloody output noted. Patient noted with immediate relief. 1248- PRN Dilaudid 1mg SIVP administered d/t patient's c/o mid abd pain. 1427- 100cc of clear emesis noted; PRN SIVP Zofran administered per MD order. 1627- 300cc of tan emesis noted. Patient noted to be eating ice chips and sipping orange Gatorade. 1632- PRN SIVP Ativan administered per MD order. 1740- PRN SIVP Morphine administered d/t patient c/o mid abd pain. 36- BSSR given to oncoming RN.

## 2023-09-02 NOTE — PLAN OF CARE
Problem: Pain  Goal: Verbalizes/displays adequate comfort level or baseline comfort level  9/2/2023 0743 by Radha John RN  Outcome: Progressing  9/1/2023 1941 by Sariah Heredia RN  Outcome: Progressing  Flowsheets (Taken 9/1/2023 1900)  Verbalizes/displays adequate comfort level or baseline comfort level: Encourage patient to monitor pain and request assistance     Problem: Skin/Tissue Integrity  Goal: Absence of new skin breakdown  Description: 1. Monitor for areas of redness and/or skin breakdown  2. Assess vascular access sites hourly  3. Every 4-6 hours minimum:  Change oxygen saturation probe site  4. Every 4-6 hours:  If on nasal continuous positive airway pressure, respiratory therapy assess nares and determine need for appliance change or resting period.   9/2/2023 0743 by Radha John RN  Outcome: Progressing  9/1/2023 1941 by Sariah Heredia RN  Outcome: Progressing     Problem: Safety - Adult  Goal: Free from fall injury  9/2/2023 0743 by Radha John RN  Outcome: Progressing  Flowsheets (Taken 9/2/2023 0130 by Sariah Heredia RN)  Free From Fall Injury: Instruct family/caregiver on patient safety  9/1/2023 1941 by Sariah Heredia RN  Outcome: Progressing  Flowsheets  Taken 9/1/2023 1930  Free From Fall Injury: Instruct family/caregiver on patient safety  Taken 9/1/2023 1900  Free From Fall Injury: Instruct family/caregiver on patient safety     Problem: ABCDS Injury Assessment  Goal: Absence of physical injury  9/2/2023 0743 by Radha John RN  Outcome: Progressing  Flowsheets (Taken 9/2/2023 0130 by Sariah Heredia RN)  Absence of Physical Injury: Implement safety measures based on patient assessment  9/1/2023 1941 by Sariah Heredia RN  Outcome: Progressing  Flowsheets  Taken 9/1/2023 1930  Absence of Physical Injury: Implement safety measures based on patient assessment  Taken 9/1/2023 1900  Absence of Physical Injury: Implement safety measures based on patient assessment

## 2023-09-03 ENCOUNTER — HOSPICE ADMISSION (OUTPATIENT)
Dept: HOSPICE | Facility: HOSPICE | Age: 48
End: 2023-09-03
Payer: COMMERCIAL

## 2023-09-03 PROBLEM — R12 HEARTBURN: Status: ACTIVE | Noted: 2023-09-03

## 2023-09-03 PROBLEM — K31.84 GASTROPARESIS: Status: ACTIVE | Noted: 2023-09-03

## 2023-09-03 PROBLEM — R11.10 REGURGITATION OF STOMACH CONTENTS: Status: ACTIVE | Noted: 2023-09-03

## 2023-09-03 PROBLEM — K44.9 HIATAL HERNIA: Status: ACTIVE | Noted: 2023-09-03

## 2023-09-03 PROBLEM — Z90.3 S/P GASTRIC SLEEVE PROCEDURE: Status: ACTIVE | Noted: 2023-09-03

## 2023-09-03 LAB
ABO + RH BLD: NORMAL
ALBUMIN SERPL-MCNC: 1.5 G/DL (ref 3.5–5)
ALBUMIN/GLOB SERPL: 0.5 (ref 0.4–1.6)
ALP SERPL-CCNC: 155 U/L (ref 50–136)
ALT SERPL-CCNC: 29 U/L (ref 12–65)
ANION GAP SERPL CALC-SCNC: 8 MMOL/L (ref 2–11)
AST SERPL-CCNC: 25 U/L (ref 15–37)
BASOPHILS # BLD: 0 K/UL (ref 0–0.2)
BASOPHILS NFR BLD: 0 % (ref 0–2)
BILIRUB SERPL-MCNC: 0.3 MG/DL (ref 0.2–1.1)
BLOOD GROUP ANTIBODIES SERPL: NORMAL
BUN SERPL-MCNC: 22 MG/DL (ref 6–23)
CALCIUM SERPL-MCNC: 7.8 MG/DL (ref 8.3–10.4)
CHLORIDE SERPL-SCNC: 114 MMOL/L (ref 101–110)
CO2 SERPL-SCNC: 21 MMOL/L (ref 21–32)
CREAT SERPL-MCNC: 0.4 MG/DL (ref 0.6–1)
DIFFERENTIAL METHOD BLD: ABNORMAL
EOSINOPHIL # BLD: 0.1 K/UL (ref 0–0.8)
EOSINOPHIL NFR BLD: 1 % (ref 0.5–7.8)
ERYTHROCYTE [DISTWIDTH] IN BLOOD BY AUTOMATED COUNT: 18 % (ref 11.9–14.6)
ERYTHROCYTE [DISTWIDTH] IN BLOOD BY AUTOMATED COUNT: 18.3 % (ref 11.9–14.6)
GLOBULIN SER CALC-MCNC: 3.2 G/DL (ref 2.8–4.5)
GLUCOSE BLD STRIP.AUTO-MCNC: 107 MG/DL (ref 65–100)
GLUCOSE SERPL-MCNC: 149 MG/DL (ref 65–100)
HCT VFR BLD AUTO: 24.9 % (ref 35.8–46.3)
HCT VFR BLD AUTO: 24.9 % (ref 35.8–46.3)
HGB BLD-MCNC: 7.3 G/DL (ref 11.7–15.4)
HGB BLD-MCNC: 7.5 G/DL (ref 11.7–15.4)
IMM GRANULOCYTES # BLD AUTO: 0.2 K/UL (ref 0–0.5)
IMM GRANULOCYTES NFR BLD AUTO: 2 % (ref 0–5)
LYMPHOCYTES # BLD: 1.3 K/UL (ref 0.5–4.6)
LYMPHOCYTES NFR BLD: 15 % (ref 13–44)
MAGNESIUM SERPL-MCNC: 1.9 MG/DL (ref 1.8–2.4)
MCH RBC QN AUTO: 27.4 PG (ref 26.1–32.9)
MCH RBC QN AUTO: 27.6 PG (ref 26.1–32.9)
MCHC RBC AUTO-ENTMCNC: 29.3 G/DL (ref 31.4–35)
MCHC RBC AUTO-ENTMCNC: 30.1 G/DL (ref 31.4–35)
MCV RBC AUTO: 91.5 FL (ref 82–102)
MCV RBC AUTO: 93.6 FL (ref 82–102)
MONOCYTES # BLD: 1.6 K/UL (ref 0.1–1.3)
MONOCYTES NFR BLD: 19 % (ref 4–12)
NEUTS SEG # BLD: 5.4 K/UL (ref 1.7–8.2)
NEUTS SEG NFR BLD: 63 % (ref 43–78)
NRBC # BLD: 0.12 K/UL (ref 0–0.2)
NRBC # BLD: 0.18 K/UL (ref 0–0.2)
PLATELET # BLD AUTO: 180 K/UL (ref 150–450)
PLATELET # BLD AUTO: 195 K/UL (ref 150–450)
PLATELET COMMENT: ADEQUATE
PMV BLD AUTO: 12 FL (ref 9.4–12.3)
PMV BLD AUTO: 12.1 FL (ref 9.4–12.3)
POTASSIUM SERPL-SCNC: 3.5 MMOL/L (ref 3.5–5.1)
PROT SERPL-MCNC: 4.7 G/DL (ref 6.3–8.2)
RBC # BLD AUTO: 2.66 M/UL (ref 4.05–5.2)
RBC # BLD AUTO: 2.72 M/UL (ref 4.05–5.2)
RBC MORPH BLD: ABNORMAL
SERVICE CMNT-IMP: ABNORMAL
SODIUM SERPL-SCNC: 143 MMOL/L (ref 133–143)
SPECIMEN EXP DATE BLD: NORMAL
WBC # BLD AUTO: 8.6 K/UL (ref 4.3–11.1)
WBC # BLD AUTO: 9.5 K/UL (ref 4.3–11.1)
WBC MORPH BLD: ABNORMAL

## 2023-09-03 PROCEDURE — 2580000003 HC RX 258: Performed by: HOSPITALIST

## 2023-09-03 PROCEDURE — 2580000003 HC RX 258: Performed by: NURSE PRACTITIONER

## 2023-09-03 PROCEDURE — 85027 COMPLETE CBC AUTOMATED: CPT

## 2023-09-03 PROCEDURE — 2580000003 HC RX 258: Performed by: INTERNAL MEDICINE

## 2023-09-03 PROCEDURE — 0656 HSPC GENERAL INPATIENT

## 2023-09-03 PROCEDURE — 6360000002 HC RX W HCPCS: Performed by: NURSE PRACTITIONER

## 2023-09-03 PROCEDURE — 83735 ASSAY OF MAGNESIUM: CPT

## 2023-09-03 PROCEDURE — 6370000000 HC RX 637 (ALT 250 FOR IP): Performed by: PHYSICIAN ASSISTANT

## 2023-09-03 PROCEDURE — C9113 INJ PANTOPRAZOLE SODIUM, VIA: HCPCS | Performed by: FAMILY MEDICINE

## 2023-09-03 PROCEDURE — A4216 STERILE WATER/SALINE, 10 ML: HCPCS | Performed by: FAMILY MEDICINE

## 2023-09-03 PROCEDURE — 2580000003 HC RX 258: Performed by: FAMILY MEDICINE

## 2023-09-03 PROCEDURE — 36591 DRAW BLOOD OFF VENOUS DEVICE: CPT

## 2023-09-03 PROCEDURE — 1100000000 HC RM PRIVATE

## 2023-09-03 PROCEDURE — 85025 COMPLETE CBC W/AUTO DIFF WBC: CPT

## 2023-09-03 PROCEDURE — 6370000000 HC RX 637 (ALT 250 FOR IP): Performed by: NURSE PRACTITIONER

## 2023-09-03 PROCEDURE — 86901 BLOOD TYPING SEROLOGIC RH(D): CPT

## 2023-09-03 PROCEDURE — 1100000003 HC PRIVATE W/ TELEMETRY

## 2023-09-03 PROCEDURE — 6360000002 HC RX W HCPCS: Performed by: FAMILY MEDICINE

## 2023-09-03 PROCEDURE — 80053 COMPREHEN METABOLIC PANEL: CPT

## 2023-09-03 PROCEDURE — 86900 BLOOD TYPING SEROLOGIC ABO: CPT

## 2023-09-03 PROCEDURE — 82962 GLUCOSE BLOOD TEST: CPT

## 2023-09-03 PROCEDURE — 6360000002 HC RX W HCPCS: Performed by: INTERNAL MEDICINE

## 2023-09-03 PROCEDURE — A4216 STERILE WATER/SALINE, 10 ML: HCPCS | Performed by: NURSE PRACTITIONER

## 2023-09-03 PROCEDURE — 86850 RBC ANTIBODY SCREEN: CPT

## 2023-09-03 PROCEDURE — 2500000003 HC RX 250 WO HCPCS: Performed by: PHYSICIAN ASSISTANT

## 2023-09-03 RX ORDER — MORPHINE SULFATE 2 MG/ML
1 INJECTION, SOLUTION INTRAMUSCULAR; INTRAVENOUS
Status: DISCONTINUED | OUTPATIENT
Start: 2023-09-03 | End: 2023-09-04

## 2023-09-03 RX ORDER — ONDANSETRON 2 MG/ML
4 INJECTION INTRAMUSCULAR; INTRAVENOUS EVERY 6 HOURS PRN
Qty: 1 ML | Refills: 0 | Status: SHIPPED | OUTPATIENT
Start: 2023-09-03

## 2023-09-03 RX ORDER — METOCLOPRAMIDE HYDROCHLORIDE 5 MG/ML
10 INJECTION INTRAMUSCULAR; INTRAVENOUS EVERY 4 HOURS PRN
Qty: 1 EACH | Refills: 0 | Status: SHIPPED | OUTPATIENT
Start: 2023-09-03

## 2023-09-03 RX ORDER — FENTANYL 75 UG/H
1 PATCH TRANSDERMAL
Qty: 1 PATCH | Refills: 0 | Status: SHIPPED | OUTPATIENT
Start: 2023-09-06 | End: 2023-10-06

## 2023-09-03 RX ORDER — HYDROMORPHONE HYDROCHLORIDE 1 MG/ML
1 INJECTION, SOLUTION INTRAMUSCULAR; INTRAVENOUS; SUBCUTANEOUS
Qty: 1 EACH | Refills: 0 | Status: SHIPPED | OUTPATIENT
Start: 2023-09-03 | End: 2023-09-06

## 2023-09-03 RX ORDER — LORAZEPAM 1 MG/1
1 TABLET ORAL EVERY 4 HOURS PRN
Qty: 1 TABLET | Refills: 0 | Status: SHIPPED | OUTPATIENT
Start: 2023-09-03 | End: 2023-10-03

## 2023-09-03 RX ORDER — SCOLOPAMINE TRANSDERMAL SYSTEM 1 MG/1
1 PATCH, EXTENDED RELEASE TRANSDERMAL
Qty: 1 PATCH | Refills: 0 | Status: SHIPPED | OUTPATIENT
Start: 2023-09-04

## 2023-09-03 RX ORDER — SODIUM CHLORIDE, SODIUM LACTATE, POTASSIUM CHLORIDE, AND CALCIUM CHLORIDE .6; .31; .03; .02 G/100ML; G/100ML; G/100ML; G/100ML
1000 INJECTION, SOLUTION INTRAVENOUS ONCE
Status: COMPLETED | OUTPATIENT
Start: 2023-09-03 | End: 2023-09-03

## 2023-09-03 RX ADMIN — CEFEPIME 2000 MG: 2 INJECTION, POWDER, FOR SOLUTION INTRAVENOUS at 14:31

## 2023-09-03 RX ADMIN — SODIUM CHLORIDE, PRESERVATIVE FREE 1 MG: 5 INJECTION INTRAVENOUS at 20:02

## 2023-09-03 RX ADMIN — POTASSIUM CHLORIDE 20 MEQ: 400 INJECTION, SOLUTION INTRAVENOUS at 14:42

## 2023-09-03 RX ADMIN — CEFEPIME 2000 MG: 2 INJECTION, POWDER, FOR SOLUTION INTRAVENOUS at 06:37

## 2023-09-03 RX ADMIN — SODIUM CHLORIDE, POTASSIUM CHLORIDE, SODIUM LACTATE AND CALCIUM CHLORIDE 1000 ML: 600; 310; 30; 20 INJECTION, SOLUTION INTRAVENOUS at 10:48

## 2023-09-03 RX ADMIN — HYDROMORPHONE HYDROCHLORIDE 1 MG: 1 INJECTION, SOLUTION INTRAMUSCULAR; INTRAVENOUS; SUBCUTANEOUS at 08:49

## 2023-09-03 RX ADMIN — POTASSIUM CHLORIDE 20 MEQ: 400 INJECTION, SOLUTION INTRAVENOUS at 12:41

## 2023-09-03 RX ADMIN — DOCUSATE SODIUM 50 MG AND SENNOSIDES 8.6 MG 2 TABLET: 8.6; 5 TABLET, FILM COATED ORAL at 08:05

## 2023-09-03 RX ADMIN — ALTEPLASE 1 MG: KIT at 05:14

## 2023-09-03 RX ADMIN — METOCLOPRAMIDE 10 MG: 5 INJECTION, SOLUTION INTRAMUSCULAR; INTRAVENOUS at 10:08

## 2023-09-03 RX ADMIN — HYDROMORPHONE HYDROCHLORIDE 1 MG: 1 INJECTION, SOLUTION INTRAMUSCULAR; INTRAVENOUS; SUBCUTANEOUS at 04:12

## 2023-09-03 RX ADMIN — MORPHINE SULFATE 1 MG: 2 INJECTION, SOLUTION INTRAMUSCULAR; INTRAVENOUS at 18:04

## 2023-09-03 RX ADMIN — ONDANSETRON 4 MG: 2 INJECTION INTRAMUSCULAR; INTRAVENOUS at 01:48

## 2023-09-03 RX ADMIN — ONDANSETRON 4 MG: 2 INJECTION INTRAMUSCULAR; INTRAVENOUS at 18:23

## 2023-09-03 RX ADMIN — SODIUM CHLORIDE, PRESERVATIVE FREE 10 ML: 5 INJECTION INTRAVENOUS at 21:35

## 2023-09-03 RX ADMIN — SODIUM CHLORIDE 1 MG: 9 INJECTION INTRAMUSCULAR; INTRAVENOUS; SUBCUTANEOUS at 04:12

## 2023-09-03 RX ADMIN — MORPHINE SULFATE 1 MG: 2 INJECTION, SOLUTION INTRAMUSCULAR; INTRAVENOUS at 20:04

## 2023-09-03 RX ADMIN — MORPHINE SULFATE 1 MG: 2 INJECTION, SOLUTION INTRAMUSCULAR; INTRAVENOUS at 22:01

## 2023-09-03 RX ADMIN — SODIUM CHLORIDE, PRESERVATIVE FREE 40 MG: 5 INJECTION INTRAVENOUS at 16:19

## 2023-09-03 RX ADMIN — HYDROMORPHONE HYDROCHLORIDE 1 MG: 1 INJECTION, SOLUTION INTRAMUSCULAR; INTRAVENOUS; SUBCUTANEOUS at 16:19

## 2023-09-03 RX ADMIN — ONDANSETRON 4 MG: 2 INJECTION INTRAMUSCULAR; INTRAVENOUS at 08:51

## 2023-09-03 RX ADMIN — POLYETHYLENE GLYCOL 3350 17 G: 17 POWDER, FOR SOLUTION ORAL at 08:05

## 2023-09-03 RX ADMIN — HYDROMORPHONE HYDROCHLORIDE 1 MG: 1 INJECTION, SOLUTION INTRAMUSCULAR; INTRAVENOUS; SUBCUTANEOUS at 12:42

## 2023-09-03 RX ADMIN — MORPHINE SULFATE 1 MG: 2 INJECTION, SOLUTION INTRAMUSCULAR; INTRAVENOUS at 23:04

## 2023-09-03 RX ADMIN — ONDANSETRON 4 MG: 2 INJECTION INTRAMUSCULAR; INTRAVENOUS at 12:41

## 2023-09-03 RX ADMIN — SODIUM CHLORIDE 1 MG: 9 INJECTION INTRAMUSCULAR; INTRAVENOUS; SUBCUTANEOUS at 16:20

## 2023-09-03 RX ADMIN — SODIUM CHLORIDE, PRESERVATIVE FREE 40 MG: 5 INJECTION INTRAVENOUS at 04:13

## 2023-09-03 RX ADMIN — SODIUM CHLORIDE, PRESERVATIVE FREE 1 MG: 5 INJECTION INTRAVENOUS at 22:01

## 2023-09-03 RX ADMIN — SODIUM CHLORIDE, PRESERVATIVE FREE 10 ML: 5 INJECTION INTRAVENOUS at 08:05

## 2023-09-03 ASSESSMENT — PAIN DESCRIPTION - ORIENTATION
ORIENTATION: MID
ORIENTATION: MID;UPPER
ORIENTATION: MID;UPPER

## 2023-09-03 ASSESSMENT — PAIN DESCRIPTION - PAIN TYPE
TYPE: ACUTE PAIN

## 2023-09-03 ASSESSMENT — PAIN DESCRIPTION - DESCRIPTORS
DESCRIPTORS: DISCOMFORT
DESCRIPTORS: BURNING;PRESSURE
DESCRIPTORS: BURNING
DESCRIPTORS: SHARP;TEARING

## 2023-09-03 ASSESSMENT — PAIN SCALES - GENERAL
PAINLEVEL_OUTOF10: 8
PAINLEVEL_OUTOF10: 0
PAINLEVEL_OUTOF10: 10
PAINLEVEL_OUTOF10: 10
PAINLEVEL_OUTOF10: 0
PAINLEVEL_OUTOF10: 0
PAINLEVEL_OUTOF10: 10

## 2023-09-03 ASSESSMENT — PAIN DESCRIPTION - LOCATION
LOCATION: ABDOMEN

## 2023-09-03 ASSESSMENT — PAIN DESCRIPTION - FREQUENCY
FREQUENCY: CONTINUOUS

## 2023-09-03 ASSESSMENT — PAIN - FUNCTIONAL ASSESSMENT
PAIN_FUNCTIONAL_ASSESSMENT: PREVENTS OR INTERFERES SOME ACTIVE ACTIVITIES AND ADLS

## 2023-09-03 ASSESSMENT — PAIN DESCRIPTION - ONSET
ONSET: ON-GOING
ONSET: ON-GOING
ONSET: AWAKENED FROM SLEEP

## 2023-09-03 NOTE — PROGRESS NOTES
Patient has vomited at least 5 times this shift. Vomiting blood. She has vomited 700 ml at this time. Medications given prn for vomiting and pain.

## 2023-09-03 NOTE — PROGRESS NOTES
Comprehensive Nutrition Assessment    Type and Reason for Visit: Reassess  Parenteral Nutrition Management (Hospitalists)  Oncology now primary    Nutrition Recommendations/Plan:   Parenteral Nutrition:  Central parenteral nutrition    Central line infusion  Continue: Dex 15%, 5% AA 1.56 L/day  12 hour cyclic infusion Infuse at 80ml 0732-3822, infuse at 140 ml 4796-7674, infuse at 80 ml/hr 8720-2080  Continue 250 ml 20% lipids daily  To provide: 1608 kcal/d (100% of needs), 78 grams of protein/d (100% of needs), 234 grams of CHO/d and 1810 ml of total volume/d. Above regimen: Intended to meet macronutrient goals  Lytes/L:   Sodium ( 10 meq NaCl), Potassium ( 20 meq KAcetate, 20 meq KCl) Phosphorus ( 7.5 mmol KPO4), Calcium (4.5 meq), Magnesium 10 meq   Other additives:   MVI Monday Wednesday Friday due to [employer], MTE  Nutrition Related Medication Management: Thiamine Not indicated  Folic Acid Not indicated  Electrolyte Replacement Protocol PRN Continue for Potassium, Phosphorus, and Magnesium    Labs:   BMP daily  Mg MWF  Phos MWF    Triglyceride weekly on Tuesdays  POC Glucoses/SSI Not indicated     Malnutrition Assessment:  Malnutrition Status: At risk for malnutrition (Comment) (PN dependent at baseline, hypermetabolic state)     Nutrition Assessment:  Nutrition History: Pt last d/c from this facility 8/22 on more concentrated lower total volume formulation and resumed home cyclic TPN via PORT (usually infuses 2100 to 0900) last infused at home on 8/26. On 8/7 encounter PN with pt indicated \"TPN bag in pts room , dex 220g, protein 85g, lipids 20% 50g. \"   PN dependent since October of 2022. Weight hx per inpt RD notes and outpt Onc visits as follows: 113# bed scale wt Oct at PN start, additional wt hx per Oncology visits: 112# 1/12/23, 117# march RD encounter outpt, 121# 4/13/23, 126# 5/4/23, 128# 6/1/23, 125# 4/12/23, 131# 7/13/23, 142# 7/26 and 7/27/23, 138# 8/3/23, 151# 8/10/23.             Do Diagnosis:   Inadequate oral intake related to altered GI structure as evidenced by  (metastatic peritoneal carcinamotosis, PN dependent at baseline)  Nutrition Interventions:   Food and/or Nutrient Delivery: Modify Parenteral Nutrition     Coordination of Nutrition Care: Continue to monitor while inpatient      Goals:   Previous Goal Met: Goal(s) Achieved  Active Goal:  (Maintain TPN for primary needs as appropriate with goals of care)       Nutrition Monitoring and Evaluation:      Food/Nutrient Intake Outcomes: Parenteral Nutrition Intake/Tolerance, Food and Nutrient Intake  Physical Signs/Symptoms Outcomes: Biochemical Data, GI Status, Fluid Status or Edema, Weight    Discharge Planning:    Parenteral Nutrition    JORDAN TAPIA, RD

## 2023-09-03 NOTE — PROGRESS NOTES
TRANSFER - OUT REPORT:    Verbal report given to TRI Ruvalcaba on [employer]  being transferred to [facility] for routine progression of patient care       Report consisted of patient's Situation, Background, Assessment and   Recommendations(SBAR). Information from the following report(s) Nurse Handoff Report, Adult Overview, Intake/Output, MAR, Recent Results, Neuro Assessment, and Event Log was reviewed with the receiving nurse. Lines:   Single Lumen Implantable Port Right Subclavian (Active)   Port A Cath Status Accessed 09/03/23 1529   Criteria for Appropriate Use Total parenteral nutrition 09/03/23 1529   Site Assessment Clean, dry & intact 09/03/23 1529   Line Care Cap changed; Connections checked and tightened;Ports disinfected 09/03/23 1529   Alcohol Cap Used Yes 09/03/23 1529   Date of Last Dressing Change 09/03/23 09/03/23 1529   Dressing Type Transparent w/CHG gel 09/03/23 1529   Dressing Status Clean, dry & intact 09/03/23 1529   Dressing Intervention Other (Comment) 09/03/23 1529   Date Accessed  08/31/23 09/03/23 1529   Access Attempts  1 08/31/23 1228   Access Needle Gauge 20 G 08/31/23 1228   Access Needle Length 0.75 inches 08/31/23 1228   Accessed By: Raul Officer, RN 08/31/23 1228   Single Lumen Status Flushed; Infusing 09/03/23 1529   Date needle changed 08/31/23 09/02/23 1304   De-Access Date 08/10/23 08/10/23 0845   De-Access Time 0830 08/10/23 0845   De-Accessed By TRI JIMENEZ 08/10/23 0845        Opportunity for questions and clarification was provided. Patient transported with:    Awaiting on patient's vitals, patient unstable at present to transfer. If patient is able to transfer, [facility]  will set up transportation. Family aware and at bedside.

## 2023-09-03 NOTE — PROGRESS NOTES
[facility]    Referral received and discussed with assigned DEANA Dominguez. Hospice liaison will contact patient/family for hospice presentation and evaluation. Liaison met with patient and Leticia Johnson / Heri Rice at bedside to discuss hospice philosophy of care, hospice team members, and frequency of visits. We also discussed the [facility] for general inpatient care with symptom management and respite care. In addition discussion provided for DNR with patient's consent to change status code from full code. Liaison answered all questions appropriately. Patient has been approved for GIP care at [facility].      Thank you for this referral,   Belle Colbert RN, [address]  [phone] C

## 2023-09-03 NOTE — CARE COORDINATION
Patient has been accepted and approved at the [facility]. Patient were able to complete HCPOA paperwork, signed DNR. Patient is unable to be transferred to the [facility] due to patient is actively passing. Staff / Family at bedside. CM will continue to follow and remain available for any needs, concerns or questions that mat arise.

## 2023-09-03 NOTE — PROGRESS NOTES
[facility]        Inpatient Hematology / Oncology Progress Note    Reason for Admission:  Tachycardia [R00.0]  Metastatic carcinoma ([address]) [C79.9]  Coffee ground emesis [K92.0]  Hematemesis with nausea [K92.0]  Leukocytosis, unspecified type [D72.829]    24 Hour Events:  Hgb - 7.3, ~ 600 mL bright red  emesis today with clots   N/V ongoing   Ongoing pain - on Fentanyl patch and utilizing IV dilaudid   Considering hospice now after continued GI bleed - consult placed but still full code    Transfusions: None  Replacements: None    ROS:  Constitutional: Positive for malaise and fatigue; negative for fever, chills, weakness. CV: Negative for chest pain, palpitations, edema. Respiratory: Negative for dyspnea, cough, wheezing. GI: Positive for nausea and abdominal pain, bright red emesis; negative for diarrhea. 10 point review of systems is otherwise negative with the exception of the elements mentioned above in the HPI.        No Known Allergies  Past Medical History:   Diagnosis Date    Abdominal pain 10/3/2022    Alteration in nutrition     per RD note TPN dependent 12 hours per day- [facility]    Anemia     Chemotherapy induced nausea and vomiting 10/14/2022    Colon cancer ([address]) dx 2/2022    followed by dr Jurgen EL-19 12/2020    no hospitalization    GERD (gastroesophageal reflux disease)     managed with med    History of blood transfusion 2022    History of colonoscopy 05/2018    Dr. Brian Sidhu, nl (see media note), R 2028    Hx of blood clots 06/2022    per pt \"small clot on lung identified by CT scan\"  CT Scan impression:- Nonobstructive pulmonary filling defect involving Left Lower Lobe     Hypotension     asymptomatic    Intractable pain 7/24/2023    Obstruction of fallopian tube     per pt has \"1 good tube\"    Peritoneal carcinomatosis ([address])     chemo; abdominal pleurx    Weight loss     80lbs weight loss after gastric sleeve     Past Surgical History:   Procedure

## 2023-09-03 NOTE — PROGRESS NOTES
provided follow after earlier rapid response. [place] family was at bedside.  provided pastoral presence, prayer and empathetic listening.   Signed by  Zelda Brown M.Div.

## 2023-09-03 NOTE — PROGRESS NOTES
follow up for end of life care, before end of shift. [place] supportive family is at bedside and expressing complex grief. Family includes teenage daughter, mother, fiancee and many others, expressing different forms of grief.  provided pastoral presence, prayer and empathetic listening.   Signed by  Chaz Guerra M.Div.

## 2023-09-03 NOTE — PROGRESS NOTES
[facility]    Liaison at bedside providing tactile and emotional support for the patient, family and hospital staff. Assigned CM Angelica and hospital  also present duing the active process of patient passing.     Thank you for this referral,  Kathrine Das RN, [facility]  [phone] C

## 2023-09-03 NOTE — PROGRESS NOTES
Advance Care Planning     Advance Care Planning Inpatient Note  [facility] Department    Today's Date: 9/3/2023  Unit: SFD 5B BONE MARROW TRANSPLANT    Received request from [employer] Provider and IDT Member. Upon review of chart and communication with care team, request Health Care Provider's clarification of patient's decision making capacity. . Patient, Healthcare Decision Maker, Friends, and Other family members was/were present in the room during visit. Goals of ACP Conversation:  Discuss advance care planning documents    Health Care Decision Makers:       Primary Decision MakeKrystenhelen Rueda  [phone]  Summary:  Completed New Documents    Advance Care Planning Documents (Patient Wishes):  Healthcare Power of /Advance Directive Appointment of Health Care Agent     Assessment:  Patient appears to be actively dying. Next of kin (mother) is unable to act as agent, as she is currently recovering from a recent surgery and is not able to be present. Megan Nickerson and other family members were at bedside. Patient had difficulty communicating, but was able to affirm that she wants her fiancee as her healthcare agent. Nurse and other staff affirmed that she was able to make her own decisions. And a witness was available to affirm patient's signature. Interventions:  Provided education on documents for clarity and greater understanding  Discussed and provided education on state decision maker hierarchy  Assisted in the completion of documents according to patient's wishes at this time  Encouraged ongoing ACP conversation with future decision makers and loved ones    Care Preferences Communicated:   No    Outcomes/Plan:  ACP Discussion: Completed  New advance directive completed. Returned original document(s) to patient, as well as copies for distribution to appointed agents  Copy of advance directive given to staff to scan into medical record.   Routed ACP note to attending provider or other IDT member.     Electronically signed by Chaplain Lopez on 9/3/2023 at 5:16 PM

## 2023-09-03 NOTE — PROGRESS NOTES
1042- Patient vomiting red, blood clots 400cc. 1043- Rapid Response called    1047  89/73 BP  133 HR    1048  111/84  121 HR  100 o2 15L    1L LR    1049     /89    1052  118/77 BP  122 HR  Nasal Cannula 3L    MD discussed with patient goals of care and hospice. Patient has agreed to talk with hospice and see what her options will be with them.      Case management consult placed per MD.

## 2023-09-03 NOTE — ACP (ADVANCE CARE PLANNING)
Advance Care Planning     Advance Care Planning Inpatient Note  [facility] Department    Today's Date: 9/3/2023  Unit: SFD 5B BONE MARROW TRANSPLANT    Received request from [employer] Provider and IDT Member. Upon review of chart and communication with care team, request Health Care Provider's clarification of patient's decision making capacity. . Patient, Healthcare Decision Maker, Friends, and Other family members  was/were present in the room during visit. Goals of ACP Conversation:  Discuss advance care planning documents    Health Care Decision Makers:       Primary Decision MakerTherese Alva - [phone]  Summary:  Completed New Documents    Advance Care Planning Documents (Patient Wishes):  Healthcare Power of /Advance Directive Appointment of Health Care Agent     Assessment:  Patient appears to be actively dying. Next of kin (mother) is unable to act as agent, as she is currently recovering from a recent surgery and is not able to be present. Sharon Carvajal and other family members were at bedside. Patient had difficulty communicating, but was able to affirm that she wants her fiancee as her healthcare agent. Nurse and other staff affirmed that she was able to make her own decisions. And a witness was available to affirm patient's signature. Interventions:  Provided education on documents for clarity and greater understanding  Discussed and provided education on state decision maker hierarchy  Assisted in the completion of documents according to patient's wishes at this time  Encouraged ongoing ACP conversation with future decision makers and loved ones    Care Preferences Communicated:   No    Outcomes/Plan:  ACP Discussion: Completed  New advance directive completed. Returned original document(s) to patient, as well as copies for distribution to appointed agents  Copy of advance directive given to staff to scan into medical record.   Routed ACP note to attending provider or

## 2023-09-03 NOTE — PLAN OF CARE
Problem: Skin/Tissue Integrity  Goal: Absence of new skin breakdown  Description: 1. Monitor for areas of redness and/or skin breakdown  2. Assess vascular access sites hourly  3. Every 4-6 hours minimum:  Change oxygen saturation probe site  4. Every 4-6 hours:  If on nasal continuous positive airway pressure, respiratory therapy assess nares and determine need for appliance change or resting period. 9/3/2023 0722 by Kumar Edwards RN  Outcome: Progressing  9/2/2023 1908 by Kate Nguyen RN  Outcome: Not Progressing     Problem: Safety - Adult  Goal: Free from fall injury  9/3/2023 0722 by Kumar Edwards RN  Outcome: Progressing  Flowsheets  Taken 9/3/2023 0303 by Kate Nguyen RN  Free From Fall Injury: Instruct family/caregiver on patient safety  Taken 9/2/2023 1942 by Kate Nguyen RN  Free From Fall Injury: Instruct family/caregiver on patient safety  9/2/2023 1908 by Kate Nguyen RN  Outcome: Not Progressing     Problem: Pain  Goal: Verbalizes/displays adequate comfort level or baseline comfort level  9/3/2023 0722 by Kumar Edwards RN  Outcome: Progressing  Flowsheets (Taken 9/2/2023 1942 by Kate Nguyen RN)  Verbalizes/displays adequate comfort level or baseline comfort level: Encourage patient to monitor pain and request assistance  9/2/2023 1908 by Kate Nguyen RN  Outcome: Not Progressing     Problem: Skin/Tissue Integrity  Goal: Absence of new skin breakdown  Description: 1. Monitor for areas of redness and/or skin breakdown  2. Assess vascular access sites hourly  3. Every 4-6 hours minimum:  Change oxygen saturation probe site  4. Every 4-6 hours:  If on nasal continuous positive airway pressure, respiratory therapy assess nares and determine need for appliance change or resting period.   9/3/2023 0722 by Kumar Edwards RN  Outcome: Progressing  9/2/2023 1908 by Kate Nguyen RN  Outcome: Not Progressing     Problem: Safety - Adult  Goal: Free from fall injury  9/3/2023 TRI Rowan  Maintains optimal cardiac output and hemodynamic stability: Monitor blood pressure and heart rate  9/2/2023 1908 by Elis Saravia RN  Outcome: Not Progressing  Goal: Absence of cardiac dysrhythmias or at baseline  Recent Flowsheet Documentation  Taken 9/2/2023 1942 by Elis Saravia RN  Absence of cardiac dysrhythmias or at baseline: Monitor cardiac rate and rhythm  9/2/2023 1908 by Elis Saravia RN  Outcome: Not Progressing     Problem: Skin/Tissue Integrity - Adult  Goal: Skin integrity remains intact  Recent Flowsheet Documentation  Taken 9/3/2023 0303 by Elis Saravia RN  Skin Integrity Remains Intact: Monitor for areas of redness and/or skin breakdown  Taken 9/2/2023 1942 by Elis Saravia RN  Skin Integrity Remains Intact: Monitor for areas of redness and/or skin breakdown  9/2/2023 1908 by Elis Saravia RN  Outcome: Not Progressing  Goal: Incisions, wounds, or drain sites healing without S/S of infection  Recent Flowsheet Documentation  Taken 9/3/2023 0303 by Elis Saravia RN  Incisions, Wounds, or Drain Sites Healing Without Sign and Symptoms of Infection: ADMISSION and DAILY: Assess and document risk factors for pressure ulcer development  Taken 9/2/2023 1942 by Elis Saravia RN  Incisions, Wounds, or Drain Sites Healing Without Sign and Symptoms of Infection: ADMISSION and DAILY: Assess and document risk factors for pressure ulcer development  9/2/2023 1908 by Elis Saravia RN  Outcome: Not Progressing  Goal: Oral mucous membranes remain intact  Recent Flowsheet Documentation  Taken 9/3/2023 0303 by Elis Saravia RN  Oral Mucous Membranes Remain Intact: Assess oral mucosa and hygiene practices  Taken 9/2/2023 1942 by Elis Saravia RN  Oral Mucous Membranes Remain Intact: Assess oral mucosa and hygiene practices  9/2/2023 1908 by Elis Saravia RN  Outcome: Not Progressing     Problem: Musculoskeletal - Adult  Goal: Return mobility to safest level of Non-disclosed/Privacy patient for phone inquiries, as appropriate  7. Provide emotional support, including active listening and acknowledgment of concerns  Recent Flowsheet Documentation  Taken 9/2/2023 1942 by Sarahi Frankel RN  Patient/caregiver aware of resources to assist with issues of abuse and neglect: Assess for level of risk and safety  9/2/2023 1908 by Sarahi Frankel RN  Outcome: Not Progressing     Problem: Drug Abuse/Detox  Goal: Will have no detox symptoms and will verbalize plan for changing drug-related behavior  Description: INTERVENTIONS:  1. Administer medication as ordered  2. Monitor physical status  3. Provide emotional support with 1:1 interaction with staff  4. Encourage  recovery focused treatment   Recent Flowsheet Documentation  Taken 9/2/2023 1942 by Sarahi Frankel RN  Will have no detox symptoms and will verbalize plan for changing drug-related behavior: Administer medication as ordered  9/2/2023 1908 by Sarahi Frankel RN  Outcome: Not Progressing     Problem: Spiritual Care  Goal: Pt/Family able to move forward in process of forgiving self, others, and/or higher power  Description: INTERVENTIONS:  1. Assist patient/family to overcome blocks to healing by use of spiritual practices (prayer, meditation, guided imagery, reiki, breath work, etc). 2. De-myth guilt and help patient/family identify possible irrational spiritual/cultural beliefs and values. 3. Explore possibilities of making amends & reconciliation with self, others, and/or a greater power. 4. Guide patient/family in identifying painful feelings of guilt. 5. Help patient/famiy explore and identify spiritual beliefs, cultural understandings or values that may help or hinder letting go of issue. 6. Help patient/family explore feelings of anger, bitterness, resentment. 7. Help patient/family identify and examine the situation in which these feelings are experienced.   8. Help patient/family identify destructive 89

## 2023-09-03 NOTE — PROGRESS NOTES
Patient may be too unstable to transport to hospice house. Patient made comfort measures by family incase patient is unable to transport. Hospice, case management, this RN, primary RN Anastasiya Marin, and other floor nurses aware. Gera Redman NP notified. Patient is now on comfort measures at the hospital, comfort orders placed.

## 2023-09-03 NOTE — PROGRESS NOTES
attended rapid response. After medical staff provided urgent care,  was able to spend time at bedside with patient. Patient appeared exhausted and had difficulty talking. Patient responded positively to  offering prayer.  provided pastoral presence, prayer and words of comfort.   Signed by  Antonio Harden M.Div.

## 2023-09-03 NOTE — CARE COORDINATION
Referral made to [facility]. Liaanamaria Pinedo) has been made aware of the referral.  CM will continue to follow.

## 2023-09-04 VITALS
WEIGHT: 150.25 LBS | BODY MASS INDEX: 25.65 KG/M2 | SYSTOLIC BLOOD PRESSURE: 115 MMHG | HEIGHT: 64 IN | TEMPERATURE: 98.1 F | DIASTOLIC BLOOD PRESSURE: 77 MMHG | OXYGEN SATURATION: 70 %

## 2023-09-04 PROCEDURE — 6360000002 HC RX W HCPCS: Performed by: FAMILY MEDICINE

## 2023-09-04 PROCEDURE — 6360000002 HC RX W HCPCS: Performed by: NURSE PRACTITIONER

## 2023-09-04 PROCEDURE — 2500000003 HC RX 250 WO HCPCS: Performed by: NURSE PRACTITIONER

## 2023-09-04 PROCEDURE — 2580000003 HC RX 258: Performed by: NURSE PRACTITIONER

## 2023-09-04 PROCEDURE — A4216 STERILE WATER/SALINE, 10 ML: HCPCS | Performed by: NURSE PRACTITIONER

## 2023-09-04 PROCEDURE — A4216 STERILE WATER/SALINE, 10 ML: HCPCS | Performed by: FAMILY MEDICINE

## 2023-09-04 PROCEDURE — 51702 INSERT TEMP BLADDER CATH: CPT

## 2023-09-04 PROCEDURE — C9113 INJ PANTOPRAZOLE SODIUM, VIA: HCPCS | Performed by: FAMILY MEDICINE

## 2023-09-04 PROCEDURE — 2580000003 HC RX 258: Performed by: FAMILY MEDICINE

## 2023-09-04 RX ORDER — MORPHINE SULFATE 2 MG/ML
2 INJECTION, SOLUTION INTRAMUSCULAR; INTRAVENOUS
Status: DISCONTINUED | OUTPATIENT
Start: 2023-09-04 | End: 2023-09-04 | Stop reason: HOSPADM

## 2023-09-04 RX ORDER — GLYCOPYRROLATE 0.2 MG/ML
0.1 INJECTION INTRAMUSCULAR; INTRAVENOUS EVERY 4 HOURS PRN
Status: DISCONTINUED | OUTPATIENT
Start: 2023-09-04 | End: 2023-09-04 | Stop reason: HOSPADM

## 2023-09-04 RX ADMIN — SODIUM CHLORIDE, PRESERVATIVE FREE 10 ML: 5 INJECTION INTRAVENOUS at 07:13

## 2023-09-04 RX ADMIN — MORPHINE SULFATE 1 MG: 2 INJECTION, SOLUTION INTRAMUSCULAR; INTRAVENOUS at 01:19

## 2023-09-04 RX ADMIN — SODIUM CHLORIDE, PRESERVATIVE FREE 1 MG: 5 INJECTION INTRAVENOUS at 06:39

## 2023-09-04 RX ADMIN — MORPHINE SULFATE 2 MG: 2 INJECTION, SOLUTION INTRAMUSCULAR; INTRAVENOUS at 09:55

## 2023-09-04 RX ADMIN — MORPHINE SULFATE 1 MG: 2 INJECTION, SOLUTION INTRAMUSCULAR; INTRAVENOUS at 08:56

## 2023-09-04 RX ADMIN — MORPHINE SULFATE 1 MG: 2 INJECTION, SOLUTION INTRAMUSCULAR; INTRAVENOUS at 07:41

## 2023-09-04 RX ADMIN — MORPHINE SULFATE 1 MG: 2 INJECTION, SOLUTION INTRAMUSCULAR; INTRAVENOUS at 03:27

## 2023-09-04 RX ADMIN — MORPHINE SULFATE 1 MG: 2 INJECTION, SOLUTION INTRAMUSCULAR; INTRAVENOUS at 02:24

## 2023-09-04 RX ADMIN — SODIUM CHLORIDE, PRESERVATIVE FREE 40 MG: 5 INJECTION INTRAVENOUS at 05:12

## 2023-09-04 RX ADMIN — MORPHINE SULFATE 2 MG: 2 INJECTION, SOLUTION INTRAMUSCULAR; INTRAVENOUS at 10:53

## 2023-09-04 RX ADMIN — SODIUM CHLORIDE, PRESERVATIVE FREE 1 MG: 5 INJECTION INTRAVENOUS at 04:33

## 2023-09-04 RX ADMIN — SODIUM CHLORIDE, PRESERVATIVE FREE 1 MG: 5 INJECTION INTRAVENOUS at 10:24

## 2023-09-04 RX ADMIN — SODIUM CHLORIDE, PRESERVATIVE FREE 1 MG: 5 INJECTION INTRAVENOUS at 02:24

## 2023-09-04 RX ADMIN — MORPHINE SULFATE 1 MG: 2 INJECTION, SOLUTION INTRAMUSCULAR; INTRAVENOUS at 06:40

## 2023-09-04 RX ADMIN — SODIUM CHLORIDE, PRESERVATIVE FREE 1 MG: 5 INJECTION INTRAVENOUS at 00:14

## 2023-09-04 RX ADMIN — SODIUM CHLORIDE, PRESERVATIVE FREE 1 MG: 5 INJECTION INTRAVENOUS at 08:35

## 2023-09-04 RX ADMIN — GLYCOPYRROLATE 0.1 MG: 0.2 INJECTION INTRAMUSCULAR; INTRAVENOUS at 10:54

## 2023-09-04 RX ADMIN — MORPHINE SULFATE 1 MG: 2 INJECTION, SOLUTION INTRAMUSCULAR; INTRAVENOUS at 04:34

## 2023-09-04 RX ADMIN — MORPHINE SULFATE 1 MG: 2 INJECTION, SOLUTION INTRAMUSCULAR; INTRAVENOUS at 00:14

## 2023-09-04 RX ADMIN — GLYCOPYRROLATE 0.1 MG: 0.2 INJECTION INTRAMUSCULAR; INTRAVENOUS at 07:12

## 2023-09-04 NOTE — PROGRESS NOTES
completed follow up visit with patient. Patient was not able to respond at time, but appeared comfortable.  consulted with nurse who confirmed that patient continues to decline and has had little response this morning.  provided pastoral presence and prayer.  is available to follow up as needed.   Signed by  Cristopher Loving M.Div.

## 2023-09-04 NOTE — PLAN OF CARE
Problem: Pain  Goal: Verbalizes/displays adequate comfort level or baseline comfort level  9/4/2023 0718 by Crispin Stringer RN  Outcome: Progressing  9/3/2023 2234 by Elena Clinton RN  Outcome: Progressing     Problem: Skin/Tissue Integrity  Goal: Absence of new skin breakdown  Description: 1. Monitor for areas of redness and/or skin breakdown  2. Assess vascular access sites hourly  3. Every 4-6 hours minimum:  Change oxygen saturation probe site  4. Every 4-6 hours:  If on nasal continuous positive airway pressure, respiratory therapy assess nares and determine need for appliance change or resting period.   Outcome: Progressing     Problem: Safety - Adult  Goal: Free from fall injury  9/4/2023 0718 by Crispin Stringer RN  Outcome: Progressing  9/3/2023 2234 by Elena Clinton RN  Outcome: Progressing     Problem: ABCDS Injury Assessment  Goal: Absence of physical injury  Outcome: Progressing     Problem: Neurosensory - Adult  Goal: Achieves stable or improved neurological status  Outcome: Progressing  Goal: Achieves maximal functionality and self care  Outcome: Progressing

## 2023-09-04 NOTE — PROGRESS NOTES
responded at time of death. Family was at bedside and grieving appropriately.  provided pastoral presence, prayer and empathetic listening.   Signed by  Yuly Rojas M.Div.

## 2023-09-04 NOTE — PROGRESS NOTES
1102- No s/sx of life noted. No respirations noted. Auscultated X1 minute. No pulse noted. MD/NP aware. Family at bedside. 1355- Post mortem care provided; port de-accessed, hyman catheter removed; silver cross necklace given to patient's family.

## 2023-09-04 NOTE — PROGRESS NOTES
Hospice liaison spoke with Edith Shi RN who states patient HR is 153, and respirations are 31. Family states they do not want patient moved anywhere at this point. Catracho Hankins RN  Hospice Nurse Liaison  [phone]: C  [phone]:  O

## 2023-09-04 NOTE — PROGRESS NOTES
Nutrition:  PN discontinued d/t change in status. Nutrition signing off.   Sabrina Simon, RD,LD, [address]

## 2023-09-04 NOTE — PROGRESS NOTES
Patient family at bedside. Patient suctioned throughout the night. Patient continuing to have bloody output when suctioned. Placed hyman overnight due to urinary incontinence/comfort measures.      Morphine IV x 9    Ativan IV x 6

## 2023-09-04 NOTE — DEATH NOTES
Death Pronouncement Note  Patient's Name: Tana Schmidt   Patient's YOB: 1975  MRN Number: [id number]    Admitting Provider: Elnaa Castelan DO  Attending Provider: Archie George MD    Patient was examined and the following were absent: Pulses, Blood Pressure, and Respiratory effort    I declared the patient dead on 9/04/2023 at 11:02 am     Preliminary Cause of Death: metastatic cancer with GI bleed    Electronically signed by BOB Zurita on 9/4/23 at 11:24 AM EDT

## 2023-09-07 ENCOUNTER — HOSPITAL ENCOUNTER (OUTPATIENT)
Dept: INFUSION THERAPY | Age: 48
End: 2023-09-07

## 2023-09-28 NOTE — PROGRESS NOTES
Physician Progress Note      PATIENT:               Lanie Pina  CSN #:                  [id number]  :                       1975  ADMIT DATE:       2023 6:44 PM  [facility] DATE:        2023 3:04 PM  RESPONDING  PROVIDER #:        Vitaly Azevedo MD          QUERY TEXT:    Pt admitted with hematemesis and has anemia documented. 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 Query created by: Niels Dial on 2023 11:37 AM      QUERY TEXT:    Pt admitted with hematemesis. Pt noted to have elevated Co2 of 34 on   admission. If possible, please document in the progress notes and discharge   summary if you are evaluating and/or treating any of the following:     The medical record reflects the following:  Risk Factors: 50 yr, vomiting, peritoneal carcinomatosis  Clinical Indicators: CO2 34 on admission, tachycardia 132, 120  Treatment: IV fluids, lab monitoring, Zofran,  Options provided:  -- Metabolic Alkalosis, present on admission  -- Other - I will add my own diagnosis  -- Disagree - Not applicable / Not valid  -- Disagree - Clinically

## 2023-09-28 NOTE — PROGRESS NOTES
Physician Progress Note      PATIENT:               Homar Barber  CSN #:                  [id number]  :                       1975  ADMIT DATE:       2023 6:44 PM  [facility] DATE:        2023 3:04 PM  RESPONDING  PROVIDER #:        Candy Reyes MD          QUERY TEXT:    Patient admitted with hematemesis. Noted documentation of Severe   protein-calorie malnutrition in the medical record and d/c summary. In order   to support the diagnosis of Severe protein-calorie malnutrition, please   include additional clinical indicators in your documentation. Or please   document if the diagnosis of Severe protein-calorie malnutrition has been   ruled out after further study. The medical record reflects the following:  Risk Factors: 48yr, NPO, PN dependent at baseline d/t peritoneal   carcinomatosis  Clinical Indicators: per RD consult note on 23 \" At risk for   malnutrition\", PN dependent, per documentation on physical exam on Oncology   consult note \"Well developed, well nourished female\" Albumin 1.8, BMI 24.5  Treatment: RD consult for management PN, lab monitoring,        ASPEN Criteria:    https://aspenjournals. onlinelibrary. massey. com/doi/full/10.1177/727053224324000  5  Options provided:  -- Severe protein-calorie malnutrition present as evidenced by, Please   document evidence. -- Severe protein-calorie malnutrition was ruled out  -- Other - I will add my own diagnosis  -- Disagree - Not applicable / Not valid  -- Disagree - Clinically unable to determine / Unknown  -- Refer to Clinical Documentation Reviewer    PROVIDER RESPONSE TEXT:    Severe protein-calorie malnutrition was ruled out after study.     Query created by: Arely Chavez on 2023 11:30 AM      Electronically signed by:  Candy Reyes MD 2023 11:41 AM

## 2023-10-11 ENCOUNTER — CLINICAL DOCUMENTATION (OUTPATIENT)
Dept: ONCOLOGY | Age: 48
End: 2023-10-11

## 2023-11-16 RX ORDER — APIXABAN 5 MG/1
TABLET, FILM COATED ORAL
Qty: 60 TABLET | Refills: 0 | OUTPATIENT
Start: 2023-11-16

## 2023-11-16 RX ORDER — POTASSIUM CHLORIDE 1500 MG/1
TABLET, EXTENDED RELEASE ORAL
Qty: 60 TABLET | Refills: 1 | OUTPATIENT
Start: 2023-11-16

## 2024-11-19 NOTE — PERIOP NOTE
Dr. Kelvin Harding back in [address] and will see patient in [id number]. How Severe Are Your Spot(S)?: mild Have Your Spot(S) Been Treated In The Past?: has not been treated Hpi Title: Evaluation of Skin Lesions

## 2025-02-17 ENCOUNTER — CLINICAL DOCUMENTATION (OUTPATIENT)
Dept: ONCOLOGY | Age: 50
End: 2025-02-17

## 2025-02-17 NOTE — PROGRESS NOTES
[facility] health faxed over orders to us from 2022 for signature.   Wrote on orders that Dr. Gamez is no longer with the practice to sign orders and pt was not under Dr. Morel's care until Feb 2023. Orders faxed back w/o signature.

## 2025-06-25 NOTE — CARE COORDINATION
Care Transitions Outreach Attempt    Call within 2 business days of discharge: Yes   Attempted to reach patient for transitions of care follow up. Unable to reach patient.    Patient: Lauren Stovall Patient : 1975 MRN: [id number]    Last Discharge [facility] Facility       Date Complaint Diagnosis Description Type Department Provider    3/2/23   Admission (Discharged) [id number] Myah Gil MD              Was this an external facility discharge? No Discharge Facility: [facility]    Noted following upcoming appointments from discharge chart review:   [facility] follow up appointment(s):   Future Appointments   Date Time Provider Department Center   3/9/2023 10:30 AM LAB [facility]   3/9/2023 11:00 AM SCOTT Robles NP [facility]   3/9/2023 11:30 AM POD3C [facility]   3/16/2023 11:30 AM LAB [facility]   3/16/2023 12:30 PM BOB Salinas [facility] AM[facility]   3/16/2023 12:30 PM Lenora Huffman RD [facility] AMB   3/16/2023  1:30 PM POD3C [facility]   3/29/2023  9:30 AM [facility] CT [address] [facility]   3/30/2023  8:15 AM LAB [facility]   3/30/2023  8:45 AM Romero Morel MD [facility] AMB   3/30/2023  9:30 AM POD1A [facility]   2023 11:15 AM Anibal Felix MD [facility]     Non-[facility] follow up appointment(s): n/a    
Breath sounds clear and equal bilaterally.

## (undated) DEVICE — CATHETER F BLLN 5CC 16FR 2 W HYDRGEL COAT LESS TRAUM LUB

## (undated) DEVICE — CONTAINER SPEC HISTOLOGY 900ML POLYPR

## (undated) DEVICE — SYRINGE, LUER SLIP, STERILE, 60ML: Brand: MEDLINE

## (undated) DEVICE — KENDALL RADIOLUCENT FOAM MONITORING ELECTRODE RECTANGULAR SHAPE: Brand: KENDALL

## (undated) DEVICE — HEMOSPRAY ENDOSCOPIC HEMOSTAT: Brand: HEMOSPRAY

## (undated) DEVICE — SUTURE PERMAHAND SZ 3-0 L30IN NONABSORBABLE BLK L26MM SH C017D

## (undated) DEVICE — PAD MATERNITY 11IN W/TAILS -- STRL

## (undated) DEVICE — TTL1LYR 16FR10ML 100%SIL TMPST TR: Brand: MEDLINE

## (undated) DEVICE — GARMENT,MEDLINE,DVT,INT,CALF,MED, GEN2: Brand: MEDLINE

## (undated) DEVICE — SUTURE COAT VCRL SZ 0 L18IN ABSRB UD W/O NDL POLYGLACTIN J112T

## (undated) DEVICE — SYRINGE MED 3ML CLR PLAS STD N CTRL LUERLOCK TIP DISP

## (undated) DEVICE — CATHETER URET 5FR L70CM OPN END SGL LUMN INJ HUB FLEXIMA

## (undated) DEVICE — SINGLE PORT MANIFOLD: Brand: NEPTUNE 2

## (undated) DEVICE — SOLUTION IRRIG 3000ML H2O STRL BAG

## (undated) DEVICE — DRAPE TWL SURG 16X26IN BLU ORB04] ALLCARE INC]

## (undated) DEVICE — 2, DISPOSABLE SUCTION/IRRIGATOR WITHOUT DISPOSABLE TIP: Brand: STRYKEFLOW

## (undated) DEVICE — SUTURE PDS II SZ 1 L96IN ABSRB VLT TP-1 L65MM 1/2 CIR Z880G

## (undated) DEVICE — Device

## (undated) DEVICE — 3M™ TEGADERM™ TRANSPARENT FILM DRESSING FRAME STYLE, 1629, 8 IN X 12 IN (20 CM X 30 CM), 10/CT 8CT/CASE: Brand: 3M™ TEGADERM™

## (undated) DEVICE — SPONGE LAP 18X18IN STRL -- 5/PK

## (undated) DEVICE — SYRINGE MED 10ML LUERLOCK TIP W/O SFTY DISP

## (undated) DEVICE — CANNULA NSL ORAL AD FOR CAPNOFLEX CO2 O2 AIRLFE

## (undated) DEVICE — GAUZE,SPONGE,4"X4",12PLY,WOVEN,NS,LF: Brand: MEDLINE

## (undated) DEVICE — DUAL LUMEN STOMACH TUBE: Brand: SALEM SUMP

## (undated) DEVICE — ENDOSCOPIC KIT 1.1+ OP4 CA DE 2 GWN AAMI LEVEL 3

## (undated) DEVICE — SPONGE: SPECIALTY PEANUT XR 100/CS: Brand: MEDICAL ACTION INDUSTRIES

## (undated) DEVICE — YANKAUER,BULB TIP,W/O VENT,RIGID,STERILE: Brand: MEDLINE

## (undated) DEVICE — NEEDLE SYR 18GA L1.5IN RED PLAS HUB S STL BLNT FILL W/O

## (undated) DEVICE — TROCAR: Brand: KII® SLEEVE

## (undated) DEVICE — [HIGH FLOW INSUFFLATOR,  DO NOT USE IF PACKAGE IS DAMAGED,  KEEP DRY,  KEEP AWAY FROM SUNLIGHT,  PROTECT FROM HEAT AND RADIOACTIVE SOURCES.]: Brand: PNEUMOSURE

## (undated) DEVICE — BAG SPEC RETRV 275ML 10ML DISPOSABLE RELIACATCH

## (undated) DEVICE — FILTER SMK EVAC FLO CLR MEGADYNE

## (undated) DEVICE — NITINOL STONE RETRIEVAL BASKET: Brand: ZERO TIP

## (undated) DEVICE — PACK SURGICAL PROCEDURE KIT CYSTOSCOPY TOTE

## (undated) DEVICE — GLOVE ORANGE PI 7 1/2   MSG9075

## (undated) DEVICE — STAPLER INT L60MM REG TISS BLU B FRM 8 FIRING 2 ROW AUTO

## (undated) DEVICE — SUTURE VCRL SZ 2-0 L27IN ABSRB VLT L36MM CT-1 1/2 CIR J339H

## (undated) DEVICE — APPLICATOR MEDICATED 26 CC SOLUTION HI LT ORNG CHLORAPREP

## (undated) DEVICE — TROCAR: Brand: KII FIOS FIRST ENTRY

## (undated) DEVICE — BAG,DRAINAGE,4L,A/R TOWER,LL,SLIDE TAP: Brand: MEDLINE

## (undated) DEVICE — TOTAL 1-LAYER TRAY, LATEX FOLEY, 16FR 10: Brand: MEDLINE

## (undated) DEVICE — SOLUTION IRRIG 1000ML 09% SOD CHL USP PIC PLAS CONTAINER

## (undated) DEVICE — KIT,ANTI FOG,W/SPONGE & FLUID,SOFT PACK: Brand: MEDLINE

## (undated) DEVICE — CONNECTOR TBNG OD5-7MM O2 END DISP

## (undated) DEVICE — PAD,NON-ADHERENT,3X8,STERILE,LF,1/PK: Brand: MEDLINE

## (undated) DEVICE — ALLEVYN GENTLE BORDER 7IN X 7INSILICONE GEL ADHESIVE HYDROCELLULAR FOAM DRESSING: Brand: ALLEVYN GENTLE BORDER 7IN X 7IN

## (undated) DEVICE — SEALER ENDOSCP L37CM NANO COAT BLNT TIP LAP DIV

## (undated) DEVICE — DRAPE,UNDERBUTTOCKS,PCH,STERILE: Brand: MEDLINE

## (undated) DEVICE — ELECTRODE PT RET AD L9FT HI MOIST COND ADH HYDRGEL CORDED

## (undated) DEVICE — SYR 50ML LR LCK 1ML GRAD NSAF --

## (undated) DEVICE — JELLY LUBRICATING 10GM PREFIL SYR LUBE

## (undated) DEVICE — BLADE ELECTRODE: Brand: EDGE

## (undated) DEVICE — AIRLIFE™ OXYGEN TUBING 7 FEET (2.1 M) CRUSH RESISTANT OXYGEN TUBING, VINYL TIPPED: Brand: AIRLIFE™

## (undated) DEVICE — DERMABOND SKIN ADH 0.7ML -- DERMABOND ADVANCED 12/BX

## (undated) DEVICE — GLOVE SURG SZ 65 L12IN FNGR THK79MIL GRN LTX FREE

## (undated) DEVICE — TRAY PREP DRY W/ PREM GLV 2 APPL 6 SPNG 2 UNDPD 1 OVERWRAP

## (undated) DEVICE — SUTURE V-LOC 180 SZ 0 L12IN ABSRB GRN L37MM GS-21 1/2 CIR VLOCL0316

## (undated) DEVICE — SOLUTION IRRIG 1000ML H2O STRL BLT

## (undated) DEVICE — SUTURE VCRL SZ 1 L36IN ABSRB UD CTX L48MM 1/2 CIR J977H

## (undated) DEVICE — INSUFFLATION NEEDLE TO ESTABLISH PNEUMOPERITONEUM.: Brand: INSUFFLATION NEEDLE

## (undated) DEVICE — BLOCK BITE AD 60FR W/ VELC STRP ADDRESSES MOST PT AND

## (undated) DEVICE — PRESSURE MONITORING SET: Brand: TRUWAVE, VAMP PLUS

## (undated) DEVICE — SUTURE PERMAHAND SZ 2-0 L30IN NONABSORBABLE BLK SILK W/O A305H

## (undated) DEVICE — BLADE CLIPPER GEN PURP NS

## (undated) DEVICE — CURVED, LARGE JAW, OPEN SEALER/DIVIDER NANO-COATED: Brand: LIGASURE IMPACT

## (undated) DEVICE — BUTTON SWITCH PENCIL BLADE ELECTRODE, HOLSTER: Brand: EDGE

## (undated) DEVICE — GLOVE SURG SZ 75 L12IN FNGR THK79MIL GRN LTX FREE

## (undated) DEVICE — INTENDED FOR TISSUE SEPARATION, AND OTHER PROCEDURES THAT REQUIRE A SHARP SURGICAL BLADE TO PUNCTURE OR CUT.: Brand: BARD-PARKER SAFETY BLADES SIZE 15, STERILE

## (undated) DEVICE — SUTURE ABSORBABLE BRAIDED 0 CT-1 8X27 IN UD VICRYL JJ41G

## (undated) DEVICE — LEGGINGS, PAIR, 31X48, STERILE: Brand: MEDLINE

## (undated) DEVICE — DISSECTOR ENDOSCP TIP DIA10MM BLNT ENDOPATH

## (undated) DEVICE — DRAPE,CHEST,FENES,15X10,STERIL: Brand: MEDLINE

## (undated) DEVICE — SYR 10ML LUER LOK 1/5ML GRAD --

## (undated) DEVICE — LAPAROSCOPIC TROCAR SLEEVE/SINGLE USE: Brand: KII® OPTICAL ACCESS SYSTEM

## (undated) DEVICE — SUTURE PERMAHAND SZ 3-0 L18IN NONABSORBABLE BLK L26MM SH C013D

## (undated) DEVICE — ESOPHAGEAL/PYLORIC/COLONIC/BILIARY WIREGUIDED BALLOON DILATATION CATHETER: Brand: CRE™ PRO

## (undated) DEVICE — CARDINAL HEALTH FLEXIBLE LIGHT HANDLE COVER: Brand: CARDINAL HEALTH

## (undated) DEVICE — SUTURE VCRL SZ 2-0 L36IN ABSRB UD L36MM CT-1 1/2 CIR J945H

## (undated) DEVICE — BLADE ASSEMB CLP HAIR FINE --

## (undated) DEVICE — GOWN,REINF,POLY,ECL,PP SLV,XL: Brand: MEDLINE

## (undated) DEVICE — NITINOL WIRE WITH HYDROPHILIC TIP: Brand: SENSOR

## (undated) DEVICE — MAJOR GENERAL: Brand: MEDLINE INDUSTRIES, INC.

## (undated) DEVICE — CATH URET 5FRX70CM W/OPN END -- BX/20

## (undated) DEVICE — SHEARS ENDOSCP L36CM DIA5MM ULTRASONIC CRV TIP W/ ADV

## (undated) DEVICE — SHEET, T, LAPAROTOMY, STERILE: Brand: MEDLINE

## (undated) DEVICE — SURGICEL ENDOSCP APPL

## (undated) DEVICE — GLOVE SURG SZ 6.5 L11.2IN THK8.6MIL LT BRN LTX FREE

## (undated) DEVICE — SOLUTION IV 1000ML 0.9% SOD CHL

## (undated) DEVICE — REM POLYHESIVE ADULT PATIENT RETURN ELECTRODE: Brand: VALLEYLAB

## (undated) DEVICE — SUTURE SZ 0 27IN 5/8 CIR UR-6  TAPER PT VIOLET ABSRB VICRYL J603H

## (undated) DEVICE — SUTURE MCRYL SZ 4-0 L27IN ABSRB UD L19MM PS-2 1/2 CIR PRIM Y426H

## (undated) DEVICE — LUKI TUBE SPECIMEN COLLECTION DEVICE,20 ML: Brand: ARGYLE

## (undated) DEVICE — SOLUTION IRRIG 3000ML 0.9% SOD CHL FLX CONT 0797208] ICU MEDICAL INC]

## (undated) DEVICE — GYN LAPAROSCOPY: Brand: MEDLINE INDUSTRIES, INC.

## (undated) DEVICE — SUTURE VCRL SZ 4-0 L27IN ABSRB UD L19MM PS-2 3/8 CIR PRIM J426H

## (undated) DEVICE — LUBE JELLY FOIL PACK 1.4 OZ: Brand: MEDLINE INDUSTRIES, INC.

## (undated) DEVICE — GUIDEWIRE ENDOSCP L150CM DIA0.038IN TIP L3CM PTFE FLX STR

## (undated) DEVICE — 40585 XL ADVANCED TRENDELENBURG POSITIONING KIT: Brand: 40585 XL ADVANCED TRENDELENBURG POSITIONING KIT

## (undated) DEVICE — SYRINGE IRRIG 60ML SFT PLIABLE BLB EZ TO GRP 1 HND USE W/

## (undated) DEVICE — CONTAINER,SPECIMEN,O.R.STRL,4.5OZ: Brand: MEDLINE

## (undated) DEVICE — CONTAINER FORMALIN PREFILLED 10% NBF 60ML

## (undated) DEVICE — 2000CC GUARDIAN II: Brand: GUARDIAN

## (undated) DEVICE — STAPLER INT L75MM CUT LN L73MM STPL LN L77MM BLU B FRM 8

## (undated) DEVICE — SUTURE VCRL SZ 0 L36IN ABSRB UD L36MM CT-1 1/2 CIR J946H

## (undated) DEVICE — PERI-PAD,MODERATE: Brand: CURITY

## (undated) DEVICE — SUTURE VCRL SZ 3-0 L36IN ABSRB UD L36MM CT-1 1/2 CIR J944H

## (undated) DEVICE — DELINEATOR MANIPULATOR 3.5CM -- ADVINCULA

## (undated) DEVICE — SUTURE PERMAHAND SZ 3-0 L30IN NONABSORBABLE BLK SILK BRAID A304H

## (undated) DEVICE — WOUND RETRACTOR AND PROTECTOR: Brand: ALEXIS O WOUND PROTECTOR-RETRACTOR

## (undated) DEVICE — CATHETER URETH 16FR BLLN 5CC L16IN SIL F INDWL 2 W SHT LEN

## (undated) DEVICE — SUTURE PERMAHAND SZ 2-0 L18IN NONABSORBABLE BLK L26MM SH C012D

## (undated) DEVICE — SUTURE PDS + SZ 1 L96IN ABSRB VLT L65MM TP-1 1/2 CIR PDP880G

## (undated) DEVICE — CATHETERIZATION KIT 8.5 FRX20 CM ARROWG+ARD BLU +

## (undated) DEVICE — SURGICAL PROCEDURE PACK BASIC ST FRANCIS

## (undated) DEVICE — ELECTRD RMFG BLNT TP LIGSR 5MM --

## (undated) DEVICE — SUTURE PERMAHAND SZ 0 L30IN NONABSORBABLE BLK SILK BRAID A306H

## (undated) DEVICE — NEEDLE HYPO 21GA L1.5IN INTRAMUSCULAR S STL LATCH BVL UP

## (undated) DEVICE — RELOAD STPL L75MM OPN H3.8MM CLS 1.5MM WIRE DIA0.2MM REG

## (undated) DEVICE — SEALER LAP L37CM SHFT DIA10MM TISS FUS HAND/FOOT SWCH BLNT

## (undated) DEVICE — FORCEPS BX L240CM JAW DIA2.8MM L CAP W/ NDL MIC MESH TOOTH

## (undated) DEVICE — CONTAINER SPEC FRMLN 120ML --

## (undated) DEVICE — POWDER HEMOSTAT GEL 3.0GR -- SURGICEL

## (undated) DEVICE — SUTURE VCRL SZ 3-0 L18IN ABSRB UD L26MM SH 1/2 CIR J864D

## (undated) DEVICE — FIAPC® PROBE W/ FILTER 2200 A OD 2.3MM/6.9FR; L 2.2M/7.2FT: Brand: ERBE

## (undated) DEVICE — (D)PREP SKN CHLRAPRP APPL 26ML -- CONVERT TO ITEM 371833

## (undated) DEVICE — PREP SKN CHLRAPRP APL 26ML STR --

## (undated) DEVICE — GAUZE,SPONGE,4"X4",16PLY,STRL,LF,10/TRAY: Brand: MEDLINE

## (undated) DEVICE — SOLUTION IRRIG 1000ML STRL H2O USP PLAS POUR BTL

## (undated) DEVICE — SUTURE VCRL SZ 2-0 L54IN ABSRB VLT W/O NDL POLYGLACTIN 910 J618H